# Patient Record
Sex: MALE | NOT HISPANIC OR LATINO | Employment: FULL TIME | URBAN - METROPOLITAN AREA
[De-identification: names, ages, dates, MRNs, and addresses within clinical notes are randomized per-mention and may not be internally consistent; named-entity substitution may affect disease eponyms.]

---

## 2017-03-26 ENCOUNTER — APPOINTMENT (OUTPATIENT)
Dept: GENERAL RADIOLOGY | Facility: CLINIC | Age: 52
DRG: 268 | End: 2017-03-26
Attending: EMERGENCY MEDICINE
Payer: COMMERCIAL

## 2017-03-26 ENCOUNTER — APPOINTMENT (OUTPATIENT)
Dept: CARDIOLOGY | Facility: CLINIC | Age: 52
DRG: 268 | End: 2017-03-26
Attending: INTERNAL MEDICINE
Payer: COMMERCIAL

## 2017-03-26 ENCOUNTER — HOSPITAL ENCOUNTER (INPATIENT)
Facility: CLINIC | Age: 52
LOS: 1 days | Discharge: ANOTHER HOSPITAL | DRG: 268 | End: 2017-03-27
Attending: EMERGENCY MEDICINE | Admitting: INTERNAL MEDICINE
Payer: COMMERCIAL

## 2017-03-26 ENCOUNTER — ANESTHESIA EVENT (OUTPATIENT)
Dept: CARDIOLOGY | Facility: CLINIC | Age: 52
DRG: 268 | End: 2017-03-26
Payer: COMMERCIAL

## 2017-03-26 ENCOUNTER — ANESTHESIA (OUTPATIENT)
Dept: CARDIOLOGY | Facility: CLINIC | Age: 52
DRG: 268 | End: 2017-03-26
Payer: COMMERCIAL

## 2017-03-26 ENCOUNTER — APPOINTMENT (OUTPATIENT)
Dept: GENERAL RADIOLOGY | Facility: CLINIC | Age: 52
DRG: 268 | End: 2017-03-26
Attending: INTERNAL MEDICINE
Payer: COMMERCIAL

## 2017-03-26 ENCOUNTER — APPOINTMENT (OUTPATIENT)
Dept: CARDIOLOGY | Facility: CLINIC | Age: 52
DRG: 268 | End: 2017-03-26
Attending: EMERGENCY MEDICINE
Payer: COMMERCIAL

## 2017-03-26 DIAGNOSIS — N17.9 ACUTE KIDNEY INJURY (H): ICD-10-CM

## 2017-03-26 DIAGNOSIS — I21.3 ST ELEVATION MYOCARDIAL INFARCTION (STEMI), UNSPECIFIED ARTERY (H): ICD-10-CM

## 2017-03-26 DIAGNOSIS — R57.0 CARDIOGENIC SHOCK (H): ICD-10-CM

## 2017-03-26 PROBLEM — I21.9 ACUTE MI (H): Status: ACTIVE | Noted: 2017-03-26

## 2017-03-26 LAB
ALBUMIN SERPL-MCNC: 4.1 G/DL (ref 3.4–5)
ALBUMIN UR-MCNC: 10 MG/DL
ALP SERPL-CCNC: 79 U/L (ref 40–150)
ALT SERPL W P-5'-P-CCNC: 981 U/L (ref 0–70)
ANION GAP SERPL CALCULATED.3IONS-SCNC: 13 MMOL/L (ref 3–14)
APPEARANCE UR: CLEAR
APTT PPP: 61 SEC (ref 22–37)
AST SERPL W P-5'-P-CCNC: 1985 U/L (ref 0–45)
BASE DEFICIT BLDV-SCNC: 7.1 MMOL/L
BASOPHILS # BLD AUTO: 0 10E9/L (ref 0–0.2)
BASOPHILS # BLD AUTO: 0 10E9/L (ref 0–0.2)
BASOPHILS NFR BLD AUTO: 0.1 %
BASOPHILS NFR BLD AUTO: 0.1 %
BILIRUB SERPL-MCNC: 0.8 MG/DL (ref 0.2–1.3)
BILIRUB UR QL STRIP: NEGATIVE
BUN SERPL-MCNC: 29 MG/DL (ref 7–30)
CA-I SERPL ISE-MCNC: 3.9 MG/DL (ref 4.4–5.2)
CALCIUM SERPL-MCNC: 8.9 MG/DL (ref 8.5–10.1)
CHLORIDE SERPL-SCNC: 102 MMOL/L (ref 94–109)
CO2 SERPL-SCNC: 25 MMOL/L (ref 20–32)
COLOR UR AUTO: ABNORMAL
CREAT BLD-MCNC: 2.4 MG/DL (ref 0.66–1.25)
CREAT SERPL-MCNC: 2.33 MG/DL (ref 0.66–1.25)
DIFFERENTIAL METHOD BLD: ABNORMAL
DIFFERENTIAL METHOD BLD: ABNORMAL
EOSINOPHIL # BLD AUTO: 0 10E9/L (ref 0–0.7)
EOSINOPHIL # BLD AUTO: 0 10E9/L (ref 0–0.7)
EOSINOPHIL NFR BLD AUTO: 0 %
EOSINOPHIL NFR BLD AUTO: 0 %
ERYTHROCYTE [DISTWIDTH] IN BLOOD BY AUTOMATED COUNT: 12.9 % (ref 10–15)
ERYTHROCYTE [DISTWIDTH] IN BLOOD BY AUTOMATED COUNT: 12.9 % (ref 10–15)
FIBRINOGEN PPP-MCNC: 324 MG/DL (ref 200–420)
GFR SERPL CREATININE-BSD FRML MDRD: 29 ML/MIN/1.7M2
GFR SERPL CREATININE-BSD FRML MDRD: 30 ML/MIN/1.7M2
GLUCOSE BLDC GLUCOMTR-MCNC: 183 MG/DL (ref 70–99)
GLUCOSE SERPL-MCNC: 185 MG/DL (ref 70–99)
GLUCOSE UR STRIP-MCNC: 300 MG/DL
HCO3 BLDV-SCNC: 20 MMOL/L (ref 21–28)
HCT VFR BLD AUTO: 40 % (ref 40–53)
HCT VFR BLD AUTO: 45.1 % (ref 40–53)
HGB BLD-MCNC: 13.6 G/DL (ref 13.3–17.7)
HGB BLD-MCNC: 15.7 G/DL (ref 13.3–17.7)
HGB UR QL STRIP: ABNORMAL
IMM GRANULOCYTES # BLD: 0.1 10E9/L (ref 0–0.4)
IMM GRANULOCYTES # BLD: 0.1 10E9/L (ref 0–0.4)
IMM GRANULOCYTES NFR BLD: 0.3 %
IMM GRANULOCYTES NFR BLD: 0.4 %
INR PPP: 1.13 (ref 0.86–1.14)
INR PPP: 1.6 (ref 0.86–1.14)
INTERPRETATION ECG - MUSE: NORMAL
KETONES UR STRIP-MCNC: NEGATIVE MG/DL
LACTATE SERPL-SCNC: 5.3 MMOL/L (ref 0.4–2)
LEUKOCYTE ESTERASE UR QL STRIP: NEGATIVE
LYMPHOCYTES # BLD AUTO: 1.1 10E9/L (ref 0.8–5.3)
LYMPHOCYTES # BLD AUTO: 1.4 10E9/L (ref 0.8–5.3)
LYMPHOCYTES NFR BLD AUTO: 5.8 %
LYMPHOCYTES NFR BLD AUTO: 9.4 %
MCH RBC QN AUTO: 30.9 PG (ref 26.5–33)
MCH RBC QN AUTO: 31.4 PG (ref 26.5–33)
MCHC RBC AUTO-ENTMCNC: 34 G/DL (ref 31.5–36.5)
MCHC RBC AUTO-ENTMCNC: 34.8 G/DL (ref 31.5–36.5)
MCV RBC AUTO: 90 FL (ref 78–100)
MCV RBC AUTO: 91 FL (ref 78–100)
MONOCYTES # BLD AUTO: 0.8 10E9/L (ref 0–1.3)
MONOCYTES # BLD AUTO: 2.1 10E9/L (ref 0–1.3)
MONOCYTES NFR BLD AUTO: 11.1 %
MONOCYTES NFR BLD AUTO: 5.7 %
MUCOUS THREADS #/AREA URNS LPF: PRESENT /LPF
NEUTROPHILS # BLD AUTO: 12.1 10E9/L (ref 1.6–8.3)
NEUTROPHILS # BLD AUTO: 15.4 10E9/L (ref 1.6–8.3)
NEUTROPHILS NFR BLD AUTO: 82.7 %
NEUTROPHILS NFR BLD AUTO: 84.4 %
NITRATE UR QL: NEGATIVE
NRBC # BLD AUTO: 0 10*3/UL
NRBC # BLD AUTO: 0 10*3/UL
NRBC BLD AUTO-RTO: 0 /100
NRBC BLD AUTO-RTO: 0 /100
OXYHGB MFR BLDV: 67 %
PCO2 BLDV: 46 MM HG (ref 40–50)
PH BLDV: 7.24 PH (ref 7.32–7.43)
PH UR STRIP: 5 PH (ref 5–7)
PLATELET # BLD AUTO: 196 10E9/L (ref 150–450)
PLATELET # BLD AUTO: 204 10E9/L (ref 150–450)
PO2 BLDV: 42 MM HG (ref 25–47)
POTASSIUM SERPL-SCNC: 4.6 MMOL/L (ref 3.4–5.3)
PROT SERPL-MCNC: 8.5 G/DL (ref 6.8–8.8)
RBC # BLD AUTO: 4.4 10E12/L (ref 4.4–5.9)
RBC # BLD AUTO: 5 10E12/L (ref 4.4–5.9)
RBC #/AREA URNS AUTO: 1 /HPF (ref 0–2)
SODIUM SERPL-SCNC: 140 MMOL/L (ref 133–144)
SP GR UR STRIP: 1.02 (ref 1–1.03)
SQUAMOUS #/AREA URNS AUTO: 1 /HPF (ref 0–1)
TROPONIN I SERPL-MCNC: ABNORMAL UG/L (ref 0–0.04)
TROPONIN I SERPL-MCNC: ABNORMAL UG/L (ref 0–0.04)
URN SPEC COLLECT METH UR: ABNORMAL
UROBILINOGEN UR STRIP-MCNC: NORMAL MG/DL (ref 0–2)
WBC # BLD AUTO: 14.3 10E9/L (ref 4–11)
WBC # BLD AUTO: 18.6 10E9/L (ref 4–11)
WBC #/AREA URNS AUTO: 1 /HPF (ref 0–2)

## 2017-03-26 PROCEDURE — 25000128 H RX IP 250 OP 636

## 2017-03-26 PROCEDURE — 82805 BLOOD GASES W/O2 SATURATION: CPT | Performed by: INTERNAL MEDICINE

## 2017-03-26 PROCEDURE — 25000132 ZZH RX MED GY IP 250 OP 250 PS 637: Performed by: EMERGENCY MEDICINE

## 2017-03-26 PROCEDURE — 37000008 ZZH ANESTHESIA TECHNICAL FEE, 1ST 30 MIN

## 2017-03-26 PROCEDURE — 27210795 ZZH PAD DEFIB QUICK CR4

## 2017-03-26 PROCEDURE — 83605 ASSAY OF LACTIC ACID: CPT | Performed by: INTERNAL MEDICINE

## 2017-03-26 PROCEDURE — 00000146 ZZHCL STATISTIC GLUCOSE BY METER IP

## 2017-03-26 PROCEDURE — 92941 PRQ TRLML REVSC TOT OCCL AMI: CPT | Mod: RC

## 2017-03-26 PROCEDURE — 82565 ASSAY OF CREATININE: CPT

## 2017-03-26 PROCEDURE — 02C03ZZ EXTIRPATION OF MATTER FROM CORONARY ARTERY, ONE ARTERY, PERCUTANEOUS APPROACH: ICD-10-PCS | Performed by: INTERNAL MEDICINE

## 2017-03-26 PROCEDURE — 40000010 ZZH STATISTIC ANES STAT CODE-CRNA PER MINUTE

## 2017-03-26 PROCEDURE — 27210946 ZZH KIT HC TOTES DISP CR8

## 2017-03-26 PROCEDURE — C1725 CATH, TRANSLUMIN NON-LASER: HCPCS

## 2017-03-26 PROCEDURE — 25000128 H RX IP 250 OP 636: Performed by: INTERNAL MEDICINE

## 2017-03-26 PROCEDURE — 85025 COMPLETE CBC W/AUTO DIFF WBC: CPT | Performed by: EMERGENCY MEDICINE

## 2017-03-26 PROCEDURE — 31500 INSERT EMERGENCY AIRWAY: CPT | Performed by: ANESTHESIOLOGY

## 2017-03-26 PROCEDURE — 33967 INSERT I-AORT PERCUT DEVICE: CPT

## 2017-03-26 PROCEDURE — 81001 URINALYSIS AUTO W/SCOPE: CPT | Performed by: INTERNAL MEDICINE

## 2017-03-26 PROCEDURE — 93308 TTE F-UP OR LMTD: CPT | Mod: 26 | Performed by: INTERNAL MEDICINE

## 2017-03-26 PROCEDURE — 99153 MOD SED SAME PHYS/QHP EA: CPT

## 2017-03-26 PROCEDURE — 40000275 ZZH STATISTIC RCP TIME EA 10 MIN

## 2017-03-26 PROCEDURE — C1757 CATH, THROMBECTOMY/EMBOLECT: HCPCS

## 2017-03-26 PROCEDURE — 87040 BLOOD CULTURE FOR BACTERIA: CPT | Performed by: INTERNAL MEDICINE

## 2017-03-26 PROCEDURE — 93454 CORONARY ARTERY ANGIO S&I: CPT

## 2017-03-26 PROCEDURE — 99152 MOD SED SAME PHYS/QHP 5/>YRS: CPT

## 2017-03-26 PROCEDURE — 5A1935Z RESPIRATORY VENTILATION, LESS THAN 24 CONSECUTIVE HOURS: ICD-10-PCS | Performed by: ANESTHESIOLOGY

## 2017-03-26 PROCEDURE — 3E073PZ INTRODUCTION OF PLATELET INHIBITOR INTO CORONARY ARTERY, PERCUTANEOUS APPROACH: ICD-10-PCS | Performed by: INTERNAL MEDICINE

## 2017-03-26 PROCEDURE — 36415 COLL VENOUS BLD VENIPUNCTURE: CPT | Performed by: INTERNAL MEDICINE

## 2017-03-26 PROCEDURE — 40000281 ZZH STATISTIC TRANSPORT TIME EA 15 MIN

## 2017-03-26 PROCEDURE — 93321 DOPPLER ECHO F-UP/LMTD STD: CPT | Mod: 26 | Performed by: INTERNAL MEDICINE

## 2017-03-26 PROCEDURE — 80053 COMPREHEN METABOLIC PANEL: CPT | Performed by: EMERGENCY MEDICINE

## 2017-03-26 PROCEDURE — 5A02210 ASSISTANCE WITH CARDIAC OUTPUT USING BALLOON PUMP, CONTINUOUS: ICD-10-PCS | Performed by: INTERNAL MEDICINE

## 2017-03-26 PROCEDURE — 93308 TTE F-UP OR LMTD: CPT

## 2017-03-26 PROCEDURE — 93799 UNLISTED CV SVC/PROCEDURE: CPT | Mod: GC | Performed by: INTERNAL MEDICINE

## 2017-03-26 PROCEDURE — 92920 PRQ TRLUML C ANGIOP 1ART&/BR: CPT

## 2017-03-26 PROCEDURE — 27211089 ZZH KIT ACIST INJECTOR CR3

## 2017-03-26 PROCEDURE — 99152 MOD SED SAME PHYS/QHP 5/>YRS: CPT | Mod: GC | Performed by: INTERNAL MEDICINE

## 2017-03-26 PROCEDURE — 33210 INSERT ELECTRD/PM CATH SNGL: CPT

## 2017-03-26 PROCEDURE — C1769 GUIDE WIRE: HCPCS

## 2017-03-26 PROCEDURE — 20000003 ZZH R&B ICU

## 2017-03-26 PROCEDURE — 85610 PROTHROMBIN TIME: CPT | Performed by: EMERGENCY MEDICINE

## 2017-03-26 PROCEDURE — 25000125 ZZHC RX 250: Performed by: INTERNAL MEDICINE

## 2017-03-26 PROCEDURE — 93325 DOPPLER ECHO COLOR FLOW MAPG: CPT | Mod: 26 | Performed by: INTERNAL MEDICINE

## 2017-03-26 PROCEDURE — 92920 PRQ TRLUML C ANGIOP 1ART&/BR: CPT | Mod: RC | Performed by: INTERNAL MEDICINE

## 2017-03-26 PROCEDURE — 86901 BLOOD TYPING SEROLOGIC RH(D): CPT | Performed by: INTERNAL MEDICINE

## 2017-03-26 PROCEDURE — 99153 MOD SED SAME PHYS/QHP EA: CPT | Mod: GC | Performed by: INTERNAL MEDICINE

## 2017-03-26 PROCEDURE — 40000276 ZZH STATISTIC RCP TIME ED VENT EA 10 MIN

## 2017-03-26 PROCEDURE — 33967 INSERT I-AORT PERCUT DEVICE: CPT | Mod: GC | Performed by: INTERNAL MEDICINE

## 2017-03-26 PROCEDURE — 25000128 H RX IP 250 OP 636: Performed by: EMERGENCY MEDICINE

## 2017-03-26 PROCEDURE — 25000125 ZZHC RX 250

## 2017-03-26 PROCEDURE — 36556 INSERT NON-TUNNEL CV CATH: CPT | Performed by: INTERNAL MEDICINE

## 2017-03-26 PROCEDURE — 40000940 XR CHEST PORT 1 VW

## 2017-03-26 PROCEDURE — 86900 BLOOD TYPING SEROLOGIC ABO: CPT | Performed by: INTERNAL MEDICINE

## 2017-03-26 PROCEDURE — 87641 MR-STAPH DNA AMP PROBE: CPT | Performed by: INTERNAL MEDICINE

## 2017-03-26 PROCEDURE — 25000132 ZZH RX MED GY IP 250 OP 250 PS 637: Performed by: INTERNAL MEDICINE

## 2017-03-26 PROCEDURE — 99291 CRITICAL CARE FIRST HOUR: CPT | Mod: 25 | Performed by: INTERNAL MEDICINE

## 2017-03-26 PROCEDURE — B2111ZZ FLUOROSCOPY OF MULTIPLE CORONARY ARTERIES USING LOW OSMOLAR CONTRAST: ICD-10-PCS | Performed by: INTERNAL MEDICINE

## 2017-03-26 PROCEDURE — 84484 ASSAY OF TROPONIN QUANT: CPT | Performed by: EMERGENCY MEDICINE

## 2017-03-26 PROCEDURE — 27210759 ZZH DEVICE INFLATION CR6

## 2017-03-26 PROCEDURE — C1887 CATHETER, GUIDING: HCPCS

## 2017-03-26 PROCEDURE — 25000125 ZZHC RX 250: Performed by: NURSE ANESTHETIST, CERTIFIED REGISTERED

## 2017-03-26 PROCEDURE — 99292 CRITICAL CARE ADDL 30 MIN: CPT | Mod: 25 | Performed by: INTERNAL MEDICINE

## 2017-03-26 PROCEDURE — 71010 XR CHEST PORT 1 VW: CPT

## 2017-03-26 PROCEDURE — 93454 CORONARY ARTERY ANGIO S&I: CPT | Mod: 26 | Performed by: INTERNAL MEDICINE

## 2017-03-26 PROCEDURE — 3E043XZ INTRODUCTION OF VASOPRESSOR INTO CENTRAL VEIN, PERCUTANEOUS APPROACH: ICD-10-PCS | Performed by: INTERNAL MEDICINE

## 2017-03-26 PROCEDURE — 86850 RBC ANTIBODY SCREEN: CPT | Performed by: INTERNAL MEDICINE

## 2017-03-26 PROCEDURE — 82330 ASSAY OF CALCIUM: CPT | Performed by: INTERNAL MEDICINE

## 2017-03-26 PROCEDURE — 27210848 ZZH CATH IABP PUMP CR18

## 2017-03-26 PROCEDURE — 94002 VENT MGMT INPAT INIT DAY: CPT

## 2017-03-26 PROCEDURE — 87640 STAPH A DNA AMP PROBE: CPT | Performed by: INTERNAL MEDICINE

## 2017-03-26 RX ORDER — FENTANYL CITRATE 50 UG/ML
25-50 INJECTION, SOLUTION INTRAMUSCULAR; INTRAVENOUS
Status: DISPENSED | OUTPATIENT
Start: 2017-03-26 | End: 2017-03-27

## 2017-03-26 RX ORDER — SODIUM NITROPRUSSIDE 25 MG/ML
100-200 INJECTION INTRAVENOUS
Status: DISCONTINUED | OUTPATIENT
Start: 2017-03-26 | End: 2017-03-26 | Stop reason: HOSPADM

## 2017-03-26 RX ORDER — AMOXICILLIN 250 MG
1-2 CAPSULE ORAL 2 TIMES DAILY PRN
Status: DISCONTINUED | OUTPATIENT
Start: 2017-03-26 | End: 2017-03-27 | Stop reason: HOSPADM

## 2017-03-26 RX ORDER — ADENOSINE 3 MG/ML
12-12000 INJECTION, SOLUTION INTRAVENOUS
Status: DISCONTINUED | OUTPATIENT
Start: 2017-03-26 | End: 2017-03-26 | Stop reason: HOSPADM

## 2017-03-26 RX ORDER — DOPAMINE HYDROCHLORIDE 160 MG/100ML
2-20 INJECTION, SOLUTION INTRAVENOUS CONTINUOUS
Status: DISCONTINUED | OUTPATIENT
Start: 2017-03-26 | End: 2017-03-27 | Stop reason: HOSPADM

## 2017-03-26 RX ORDER — PROMETHAZINE HYDROCHLORIDE 25 MG/ML
6.25-25 INJECTION, SOLUTION INTRAMUSCULAR; INTRAVENOUS EVERY 4 HOURS PRN
Status: DISCONTINUED | OUTPATIENT
Start: 2017-03-26 | End: 2017-03-26 | Stop reason: HOSPADM

## 2017-03-26 RX ORDER — EPTIFIBATIDE 2 MG/ML
180 INJECTION, SOLUTION INTRAVENOUS EVERY 10 MIN PRN
Status: DISCONTINUED | OUTPATIENT
Start: 2017-03-26 | End: 2017-03-26 | Stop reason: HOSPADM

## 2017-03-26 RX ORDER — FENTANYL CITRATE 50 UG/ML
INJECTION, SOLUTION INTRAMUSCULAR; INTRAVENOUS
Status: COMPLETED
Start: 2017-03-26 | End: 2017-03-26

## 2017-03-26 RX ORDER — NITROGLYCERIN 0.4 MG/1
0.4 TABLET SUBLINGUAL EVERY 5 MIN PRN
Status: DISCONTINUED | OUTPATIENT
Start: 2017-03-26 | End: 2017-03-26 | Stop reason: HOSPADM

## 2017-03-26 RX ORDER — ACETAMINOPHEN 325 MG/1
325-650 TABLET ORAL EVERY 4 HOURS PRN
Status: DISCONTINUED | OUTPATIENT
Start: 2017-03-26 | End: 2017-03-27 | Stop reason: HOSPADM

## 2017-03-26 RX ORDER — FENTANYL CITRATE 50 UG/ML
50 INJECTION, SOLUTION INTRAMUSCULAR; INTRAVENOUS ONCE
Status: COMPLETED | OUTPATIENT
Start: 2017-03-26 | End: 2017-03-26

## 2017-03-26 RX ORDER — NICARDIPINE HYDROCHLORIDE 2.5 MG/ML
100 INJECTION INTRAVENOUS
Status: DISCONTINUED | OUTPATIENT
Start: 2017-03-26 | End: 2017-03-26 | Stop reason: HOSPADM

## 2017-03-26 RX ORDER — HEPARIN SODIUM 5000 [USP'U]/.5ML
5000 INJECTION, SOLUTION INTRAVENOUS; SUBCUTANEOUS EVERY 8 HOURS
Status: DISCONTINUED | OUTPATIENT
Start: 2017-03-26 | End: 2017-03-26

## 2017-03-26 RX ORDER — NIFEDIPINE 10 MG/1
10 CAPSULE ORAL
Status: DISCONTINUED | OUTPATIENT
Start: 2017-03-26 | End: 2017-03-26 | Stop reason: HOSPADM

## 2017-03-26 RX ORDER — LIDOCAINE HYDROCHLORIDE 10 MG/ML
30 INJECTION, SOLUTION EPIDURAL; INFILTRATION; INTRACAUDAL; PERINEURAL
Status: DISCONTINUED | OUTPATIENT
Start: 2017-03-26 | End: 2017-03-26 | Stop reason: HOSPADM

## 2017-03-26 RX ORDER — NICOTINE POLACRILEX 4 MG
15-30 LOZENGE BUCCAL
Status: DISCONTINUED | OUTPATIENT
Start: 2017-03-26 | End: 2017-03-27 | Stop reason: HOSPADM

## 2017-03-26 RX ORDER — ADENOSINE 3 MG/ML
6 INJECTION, SOLUTION INTRAVENOUS ONCE
Status: COMPLETED | OUTPATIENT
Start: 2017-03-26 | End: 2017-03-26

## 2017-03-26 RX ORDER — IOPAMIDOL 755 MG/ML
55 INJECTION, SOLUTION INTRAVASCULAR ONCE
Status: COMPLETED | OUTPATIENT
Start: 2017-03-26 | End: 2017-03-26

## 2017-03-26 RX ORDER — PROTAMINE SULFATE 10 MG/ML
25-100 INJECTION, SOLUTION INTRAVENOUS EVERY 5 MIN PRN
Status: DISCONTINUED | OUTPATIENT
Start: 2017-03-26 | End: 2017-03-26 | Stop reason: HOSPADM

## 2017-03-26 RX ORDER — NALOXONE HYDROCHLORIDE 0.4 MG/ML
.1-.4 INJECTION, SOLUTION INTRAMUSCULAR; INTRAVENOUS; SUBCUTANEOUS
Status: DISCONTINUED | OUTPATIENT
Start: 2017-03-26 | End: 2017-03-26

## 2017-03-26 RX ORDER — ASPIRIN 325 MG
325 TABLET ORAL
Status: DISCONTINUED | OUTPATIENT
Start: 2017-03-26 | End: 2017-03-26 | Stop reason: HOSPADM

## 2017-03-26 RX ORDER — DOPAMINE HYDROCHLORIDE 160 MG/100ML
2-20 INJECTION, SOLUTION INTRAVENOUS CONTINUOUS PRN
Status: DISCONTINUED | OUTPATIENT
Start: 2017-03-26 | End: 2017-03-26 | Stop reason: HOSPADM

## 2017-03-26 RX ORDER — ETOMIDATE 2 MG/ML
INJECTION INTRAVENOUS PRN
Status: DISCONTINUED | OUTPATIENT
Start: 2017-03-26 | End: 2017-03-26

## 2017-03-26 RX ORDER — ONDANSETRON 2 MG/ML
4 INJECTION INTRAMUSCULAR; INTRAVENOUS EVERY 4 HOURS PRN
Status: DISCONTINUED | OUTPATIENT
Start: 2017-03-26 | End: 2017-03-26 | Stop reason: HOSPADM

## 2017-03-26 RX ORDER — NALOXONE HYDROCHLORIDE 0.4 MG/ML
.1-.4 INJECTION, SOLUTION INTRAMUSCULAR; INTRAVENOUS; SUBCUTANEOUS
Status: DISCONTINUED | OUTPATIENT
Start: 2017-03-26 | End: 2017-03-27 | Stop reason: HOSPADM

## 2017-03-26 RX ORDER — DOBUTAMINE HYDROCHLORIDE 200 MG/100ML
2-20 INJECTION INTRAVENOUS CONTINUOUS PRN
Status: DISCONTINUED | OUTPATIENT
Start: 2017-03-26 | End: 2017-03-26 | Stop reason: HOSPADM

## 2017-03-26 RX ORDER — HYDROCODONE BITARTRATE AND ACETAMINOPHEN 5; 325 MG/1; MG/1
1-2 TABLET ORAL EVERY 4 HOURS PRN
Status: DISCONTINUED | OUTPATIENT
Start: 2017-03-26 | End: 2017-03-27 | Stop reason: HOSPADM

## 2017-03-26 RX ORDER — ENALAPRILAT 1.25 MG/ML
1.25-2.5 INJECTION INTRAVENOUS
Status: DISCONTINUED | OUTPATIENT
Start: 2017-03-26 | End: 2017-03-26 | Stop reason: HOSPADM

## 2017-03-26 RX ORDER — POTASSIUM CHLORIDE 7.45 MG/ML
10 INJECTION INTRAVENOUS
Status: DISCONTINUED | OUTPATIENT
Start: 2017-03-26 | End: 2017-03-26 | Stop reason: HOSPADM

## 2017-03-26 RX ORDER — NITROGLYCERIN 5 MG/ML
100-500 VIAL (ML) INTRAVENOUS
Status: DISCONTINUED | OUTPATIENT
Start: 2017-03-26 | End: 2017-03-26 | Stop reason: HOSPADM

## 2017-03-26 RX ORDER — NITROGLYCERIN 20 MG/100ML
.07-2 INJECTION INTRAVENOUS CONTINUOUS PRN
Status: DISCONTINUED | OUTPATIENT
Start: 2017-03-26 | End: 2017-03-26 | Stop reason: HOSPADM

## 2017-03-26 RX ORDER — DIPHENHYDRAMINE HYDROCHLORIDE 50 MG/ML
25-50 INJECTION INTRAMUSCULAR; INTRAVENOUS
Status: DISCONTINUED | OUTPATIENT
Start: 2017-03-26 | End: 2017-03-26 | Stop reason: HOSPADM

## 2017-03-26 RX ORDER — NALOXONE HYDROCHLORIDE 0.4 MG/ML
.2-.4 INJECTION, SOLUTION INTRAMUSCULAR; INTRAVENOUS; SUBCUTANEOUS
Status: ACTIVE | OUTPATIENT
Start: 2017-03-26 | End: 2017-03-27

## 2017-03-26 RX ORDER — MIDAZOLAM (PF) 1 MG/ML IN 0.9 % SODIUM CHLORIDE INTRAVENOUS SOLUTION
1-8 CONTINUOUS
Status: DISCONTINUED | OUTPATIENT
Start: 2017-03-26 | End: 2017-03-27 | Stop reason: HOSPADM

## 2017-03-26 RX ORDER — IPRATROPIUM BROMIDE AND ALBUTEROL SULFATE 2.5; .5 MG/3ML; MG/3ML
3 SOLUTION RESPIRATORY (INHALATION) EVERY 4 HOURS PRN
Status: DISCONTINUED | OUTPATIENT
Start: 2017-03-26 | End: 2017-03-27 | Stop reason: HOSPADM

## 2017-03-26 RX ORDER — ASPIRIN 81 MG/1
81 TABLET ORAL DAILY
Status: DISCONTINUED | OUTPATIENT
Start: 2017-03-27 | End: 2017-03-27

## 2017-03-26 RX ORDER — SODIUM CHLORIDE 9 MG/ML
INJECTION, SOLUTION INTRAVENOUS CONTINUOUS
Status: ACTIVE | OUTPATIENT
Start: 2017-03-26 | End: 2017-03-27

## 2017-03-26 RX ORDER — ONDANSETRON 2 MG/ML
4 INJECTION INTRAMUSCULAR; INTRAVENOUS EVERY 6 HOURS PRN
Status: DISCONTINUED | OUTPATIENT
Start: 2017-03-26 | End: 2017-03-27 | Stop reason: HOSPADM

## 2017-03-26 RX ORDER — CLOPIDOGREL BISULFATE 75 MG/1
75 TABLET ORAL
Status: DISCONTINUED | OUTPATIENT
Start: 2017-03-26 | End: 2017-03-26 | Stop reason: HOSPADM

## 2017-03-26 RX ORDER — HEPARIN SODIUM 1000 [USP'U]/ML
INJECTION, SOLUTION INTRAVENOUS; SUBCUTANEOUS
Status: COMPLETED
Start: 2017-03-26 | End: 2017-03-26

## 2017-03-26 RX ORDER — PRASUGREL 10 MG/1
10-60 TABLET, FILM COATED ORAL
Status: DISCONTINUED | OUTPATIENT
Start: 2017-03-26 | End: 2017-03-26 | Stop reason: HOSPADM

## 2017-03-26 RX ORDER — ONDANSETRON 4 MG/1
4 TABLET, ORALLY DISINTEGRATING ORAL EVERY 6 HOURS PRN
Status: DISCONTINUED | OUTPATIENT
Start: 2017-03-26 | End: 2017-03-27 | Stop reason: HOSPADM

## 2017-03-26 RX ORDER — PHENYLEPHRINE HCL IN 0.9% NACL 1 MG/10 ML
20-100 SYRINGE (ML) INTRAVENOUS
Status: DISCONTINUED | OUTPATIENT
Start: 2017-03-26 | End: 2017-03-26 | Stop reason: HOSPADM

## 2017-03-26 RX ORDER — ASPIRIN 81 MG/1
81-324 TABLET, CHEWABLE ORAL
Status: DISCONTINUED | OUTPATIENT
Start: 2017-03-26 | End: 2017-03-26 | Stop reason: HOSPADM

## 2017-03-26 RX ORDER — HYDRALAZINE HYDROCHLORIDE 20 MG/ML
10-20 INJECTION INTRAMUSCULAR; INTRAVENOUS
Status: DISCONTINUED | OUTPATIENT
Start: 2017-03-26 | End: 2017-03-26 | Stop reason: HOSPADM

## 2017-03-26 RX ORDER — FENTANYL CITRATE 50 UG/ML
25-50 INJECTION, SOLUTION INTRAMUSCULAR; INTRAVENOUS
Status: DISCONTINUED | OUTPATIENT
Start: 2017-03-26 | End: 2017-03-26 | Stop reason: HOSPADM

## 2017-03-26 RX ORDER — LIDOCAINE 40 MG/G
CREAM TOPICAL
Status: DISCONTINUED | OUTPATIENT
Start: 2017-03-26 | End: 2017-03-27 | Stop reason: HOSPADM

## 2017-03-26 RX ORDER — HEPARIN SODIUM 1000 [USP'U]/ML
1000-10000 INJECTION, SOLUTION INTRAVENOUS; SUBCUTANEOUS EVERY 5 MIN PRN
Status: DISCONTINUED | OUTPATIENT
Start: 2017-03-26 | End: 2017-03-26 | Stop reason: HOSPADM

## 2017-03-26 RX ORDER — EPTIFIBATIDE 2 MG/ML
2 INJECTION, SOLUTION INTRAVENOUS CONTINUOUS PRN
Status: DISCONTINUED | OUTPATIENT
Start: 2017-03-26 | End: 2017-03-26 | Stop reason: HOSPADM

## 2017-03-26 RX ORDER — POTASSIUM CHLORIDE 29.8 MG/ML
20 INJECTION INTRAVENOUS
Status: DISCONTINUED | OUTPATIENT
Start: 2017-03-26 | End: 2017-03-26 | Stop reason: HOSPADM

## 2017-03-26 RX ORDER — FLUMAZENIL 0.1 MG/ML
0.2 INJECTION, SOLUTION INTRAVENOUS
Status: DISCONTINUED | OUTPATIENT
Start: 2017-03-26 | End: 2017-03-26 | Stop reason: HOSPADM

## 2017-03-26 RX ORDER — NITROGLYCERIN 0.4 MG/1
0.4 TABLET SUBLINGUAL EVERY 5 MIN PRN
Status: DISCONTINUED | OUTPATIENT
Start: 2017-03-26 | End: 2017-03-27 | Stop reason: HOSPADM

## 2017-03-26 RX ORDER — DOBUTAMINE HYDROCHLORIDE 200 MG/100ML
2.5-2 INJECTION INTRAVENOUS CONTINUOUS
Status: DISCONTINUED | OUTPATIENT
Start: 2017-03-26 | End: 2017-03-26

## 2017-03-26 RX ORDER — LIDOCAINE HYDROCHLORIDE 10 MG/ML
1-10 INJECTION, SOLUTION EPIDURAL; INFILTRATION; INTRACAUDAL; PERINEURAL
Status: COMPLETED | OUTPATIENT
Start: 2017-03-26 | End: 2017-03-26

## 2017-03-26 RX ORDER — MORPHINE SULFATE 2 MG/ML
1-2 INJECTION, SOLUTION INTRAMUSCULAR; INTRAVENOUS EVERY 5 MIN PRN
Status: DISCONTINUED | OUTPATIENT
Start: 2017-03-26 | End: 2017-03-26 | Stop reason: HOSPADM

## 2017-03-26 RX ORDER — DEXTROSE MONOHYDRATE 25 G/50ML
25-50 INJECTION, SOLUTION INTRAVENOUS
Status: DISCONTINUED | OUTPATIENT
Start: 2017-03-26 | End: 2017-03-27 | Stop reason: HOSPADM

## 2017-03-26 RX ORDER — CHLORHEXIDINE GLUCONATE ORAL RINSE 1.2 MG/ML
15 SOLUTION DENTAL EVERY 12 HOURS
Status: DISCONTINUED | OUTPATIENT
Start: 2017-03-26 | End: 2017-03-27 | Stop reason: HOSPADM

## 2017-03-26 RX ORDER — FENTANYL CITRATE 50 UG/ML
25-50 INJECTION, SOLUTION INTRAMUSCULAR; INTRAVENOUS
Status: DISCONTINUED | OUTPATIENT
Start: 2017-03-26 | End: 2017-03-26

## 2017-03-26 RX ORDER — DEXTROSE MONOHYDRATE 25 G/50ML
12.5-5 INJECTION, SOLUTION INTRAVENOUS EVERY 30 MIN PRN
Status: DISCONTINUED | OUTPATIENT
Start: 2017-03-26 | End: 2017-03-26 | Stop reason: HOSPADM

## 2017-03-26 RX ORDER — NALOXONE HYDROCHLORIDE 0.4 MG/ML
0.4 INJECTION, SOLUTION INTRAMUSCULAR; INTRAVENOUS; SUBCUTANEOUS EVERY 5 MIN PRN
Status: DISCONTINUED | OUTPATIENT
Start: 2017-03-26 | End: 2017-03-26 | Stop reason: HOSPADM

## 2017-03-26 RX ORDER — PROTAMINE SULFATE 10 MG/ML
1-5 INJECTION, SOLUTION INTRAVENOUS
Status: DISCONTINUED | OUTPATIENT
Start: 2017-03-26 | End: 2017-03-26 | Stop reason: HOSPADM

## 2017-03-26 RX ORDER — FLUMAZENIL 0.1 MG/ML
0.2 INJECTION, SOLUTION INTRAVENOUS
Status: ACTIVE | OUTPATIENT
Start: 2017-03-26 | End: 2017-03-27

## 2017-03-26 RX ORDER — VERAPAMIL HYDROCHLORIDE 2.5 MG/ML
1-2.5 INJECTION, SOLUTION INTRAVENOUS
Status: DISCONTINUED | OUTPATIENT
Start: 2017-03-26 | End: 2017-03-26 | Stop reason: HOSPADM

## 2017-03-26 RX ORDER — SODIUM CHLORIDE 9 MG/ML
INJECTION, SOLUTION INTRAVENOUS ONCE
Status: DISCONTINUED | OUTPATIENT
Start: 2017-03-26 | End: 2017-03-26

## 2017-03-26 RX ORDER — FUROSEMIDE 10 MG/ML
20-100 INJECTION INTRAMUSCULAR; INTRAVENOUS
Status: DISCONTINUED | OUTPATIENT
Start: 2017-03-26 | End: 2017-03-26 | Stop reason: HOSPADM

## 2017-03-26 RX ORDER — FENTANYL CITRATE 50 UG/ML
25-50 INJECTION, SOLUTION INTRAMUSCULAR; INTRAVENOUS
Status: DISCONTINUED | OUTPATIENT
Start: 2017-03-26 | End: 2017-03-27 | Stop reason: HOSPADM

## 2017-03-26 RX ORDER — CLOPIDOGREL BISULFATE 75 MG/1
300-600 TABLET ORAL
Status: DISCONTINUED | OUTPATIENT
Start: 2017-03-26 | End: 2017-03-26 | Stop reason: HOSPADM

## 2017-03-26 RX ORDER — LORAZEPAM 2 MG/ML
.5-2 INJECTION INTRAMUSCULAR EVERY 4 HOURS PRN
Status: DISCONTINUED | OUTPATIENT
Start: 2017-03-26 | End: 2017-03-26 | Stop reason: HOSPADM

## 2017-03-26 RX ORDER — BUPIVACAINE HYDROCHLORIDE 2.5 MG/ML
1-10 INJECTION, SOLUTION EPIDURAL; INFILTRATION; INTRACAUDAL
Status: DISCONTINUED | OUTPATIENT
Start: 2017-03-26 | End: 2017-03-26 | Stop reason: HOSPADM

## 2017-03-26 RX ORDER — METHYLPREDNISOLONE SODIUM SUCCINATE 125 MG/2ML
125 INJECTION, POWDER, LYOPHILIZED, FOR SOLUTION INTRAMUSCULAR; INTRAVENOUS
Status: DISCONTINUED | OUTPATIENT
Start: 2017-03-26 | End: 2017-03-26 | Stop reason: HOSPADM

## 2017-03-26 RX ORDER — NITROGLYCERIN 5 MG/ML
100-200 VIAL (ML) INTRAVENOUS
Status: DISCONTINUED | OUTPATIENT
Start: 2017-03-26 | End: 2017-03-26 | Stop reason: HOSPADM

## 2017-03-26 RX ADMIN — HEPARIN SODIUM 600 UNITS/HR: 10000 INJECTION, SOLUTION INTRAVENOUS at 23:30

## 2017-03-26 RX ADMIN — MIDAZOLAM HYDROCHLORIDE 7.5 MG: 1 INJECTION, SOLUTION INTRAMUSCULAR; INTRAVENOUS at 20:15

## 2017-03-26 RX ADMIN — BIVALIRUDIN 1.75 MG/KG/HR: 250 INJECTION, POWDER, LYOPHILIZED, FOR SOLUTION INTRAVENOUS at 18:59

## 2017-03-26 RX ADMIN — IOPAMIDOL 55 ML: 755 INJECTION, SOLUTION INTRAVASCULAR at 19:45

## 2017-03-26 RX ADMIN — DOPAMINE HYDROCHLORIDE IN DEXTROSE 9 MCG/KG/MIN: 1.6 INJECTION, SOLUTION INTRAVENOUS at 23:10

## 2017-03-26 RX ADMIN — MIDAZOLAM HYDROCHLORIDE 2 MG: 1 INJECTION, SOLUTION INTRAMUSCULAR; INTRAVENOUS at 20:42

## 2017-03-26 RX ADMIN — PANTOPRAZOLE SODIUM 40 MG: 40 INJECTION, POWDER, FOR SOLUTION INTRAVENOUS at 23:40

## 2017-03-26 RX ADMIN — FENTANYL CITRATE 25 MCG: 50 INJECTION, SOLUTION INTRAMUSCULAR; INTRAVENOUS at 21:51

## 2017-03-26 RX ADMIN — SODIUM CHLORIDE: 9 INJECTION, SOLUTION INTRAVENOUS at 23:09

## 2017-03-26 RX ADMIN — ATROPINE SULFATE 1 MG: 0.1 INJECTION, SOLUTION ENDOTRACHEAL; INTRAMUSCULAR; INTRAVENOUS; SUBCUTANEOUS at 18:48

## 2017-03-26 RX ADMIN — CHLORHEXIDINE GLUCONATE 15 ML: 1.2 RINSE ORAL at 23:26

## 2017-03-26 RX ADMIN — Medication 1 MG/HR: at 21:05

## 2017-03-26 RX ADMIN — TICAGRELOR 180 MG: 90 TABLET ORAL at 18:07

## 2017-03-26 RX ADMIN — ADENOSINE 6 MG: 3 INJECTION, SOLUTION INTRAVENOUS at 19:27

## 2017-03-26 RX ADMIN — NITROGLYCERIN 200 MCG: 5 INJECTION, SOLUTION INTRAVENOUS at 18:43

## 2017-03-26 RX ADMIN — Medication 51.4 MG: at 18:57

## 2017-03-26 RX ADMIN — ETOMIDATE 20 MG: 2 INJECTION, SOLUTION INTRAVENOUS at 20:05

## 2017-03-26 RX ADMIN — LIDOCAINE HYDROCHLORIDE 90 MG: 10 INJECTION, SOLUTION EPIDURAL; INFILTRATION; INTRACAUDAL; PERINEURAL at 18:32

## 2017-03-26 RX ADMIN — FENTANYL CITRATE 100 MCG: 50 INJECTION, SOLUTION INTRAMUSCULAR; INTRAVENOUS at 20:14

## 2017-03-26 RX ADMIN — DOPAMINE HYDROCHLORIDE 2 MCG/KG/MIN: 160 INJECTION, SOLUTION INTRAVENOUS at 18:40

## 2017-03-26 RX ADMIN — AMIODARONE HYDROCHLORIDE 150 MG: 50 INJECTION, SOLUTION INTRAVENOUS at 19:30

## 2017-03-26 RX ADMIN — SODIUM CHLORIDE 1000 ML: 9 INJECTION, SOLUTION INTRAVENOUS at 18:06

## 2017-03-26 RX ADMIN — SODIUM CHLORIDE 1000 ML: 9 INJECTION, SOLUTION INTRAVENOUS at 18:49

## 2017-03-26 RX ADMIN — MIDAZOLAM HYDROCHLORIDE 0.5 MG: 1 INJECTION, SOLUTION INTRAMUSCULAR; INTRAVENOUS at 18:31

## 2017-03-26 RX ADMIN — HEPARIN SODIUM 4000 UNITS: 1000 INJECTION, SOLUTION INTRAVENOUS; SUBCUTANEOUS at 18:13

## 2017-03-26 ASSESSMENT — ENCOUNTER SYMPTOMS
BACK PAIN: 1
VOMITING: 1
LIGHT-HEADEDNESS: 0
DIZZINESS: 0

## 2017-03-26 NOTE — ED NOTES
Bed: [facility]  Expected date:   Expected time:   Means of arrival:   Comments:  Karon [address] 52 male

## 2017-03-26 NOTE — ED PROVIDER NOTES
"  History     Chief Complaint:  STEMI    HPI   Initial history is somewhat limited secondary to language barrier and lack of  present.    Luke Henao is a 51 year old [place]ian-speaking male with a PMH significant for diabetes who presents to the emergency department for evaluation of a STEMI. History is provided by EMS secondary to language barrier. Family informed EMS that the patient began having vomiting and \"flu-like\" symptoms two days ago along with back pain between his shoulder blades. He appeared generally weak today so his family called 911. EMS found he had a STEMI on EKG completed en route to the hospital. He had a blood sugar of 201, blood pressure of 88/57, and was bradycardic for paramedics. They gave him Aspirin but no Nitro. Here in the ED, the patient denies having any chest pain or back pain. He denies feeling lightheaded or dizzy.     Allergies:  No Known Drug Allergies     Medications:    Metformin    Past Medical History:    Diabetes  Hypertension    Past Surgical History:   History reviewed.  No pertinent past surgical history.    Family History:   History reviewed. No pertinent family history.    Social History:   Tobacco use:    Current 0.50 ppd smoker  Marital status:         Review of Systems   Gastrointestinal: Positive for vomiting.   Musculoskeletal: Positive for back pain.   Neurological: Negative for dizziness and light-headedness.   ROS is somewhat limited secondary to language barrier.     Physical Exam   First Vitals:  Resp 14  Ht 1.575 m (5' 2\")   BP: 90/80  HR: 59    Patient Vitals for the past 24 hrs:   BP Temp Heart Rate Resp SpO2 Height Weight   03/26/17 1810 (!) 73/60 - 51 11 100 % - -   03/26/17 1805 (!) 85/55 - 57  100 % - -   03/26/17 1800 (!) 85/60 - 57 9 95 % - -   03/26/17 1755 90/80 97.7  F (36.5  C) 90 14 - 1.575 m (5' 2\") 68.5 kg (151 lb 0.2 oz)       Physical Exam   General: Alert, interactive in moderate distress  Head:  Scalp is " atraumatic  Eyes:  The pupils are equal, round, and reactive to light    EOM's intact    No scleral icterus  ENT:      Nose:  The external nose is normal  Ears:  External ears are normal  Mouth/Throat: The oropharynx is normal    Mucus membranes are dry      Neck:  Normal range of motion.      There is no rigidity.    Trachea is in the midline         CV:  Bradycardia. Regular rhythm    No murmur     2+ pulses in all four extremities.   Resp:  Breath sounds are clear bilaterally    Non-labored, no retractions or accessory muscle use      GI:  Abdomen is soft, no distension, no tenderness.       MS:  Normal strength in all 4 extremities    No peripheral edema.  Skin:  Pallor/Jaundice. Warm and dry, No rash or lesions noted.  Neuro: Strength 5/5 x4.  Sensation intact  In all 4 extremities.        Psych:  Awake. Alert.  Normal affect.      Appropriate interactions.    Emergency Department Course   ECG (17:55:08):  Indication: Screening for cardiovascular disease.   Rate 57 bpm. AL interval 194. QRS duration 82. QT/QTc 452/439. P-R-T axes 68 -23 87.   Interpretation: Sinus bradycardia with fusion complexes. Inferior-posterior infarct, possibly acute. ** ** ACUTE MI/STEMI ** **. Consider right ventricular involvement in acute inferior infarct. Abnormal ECG.   Agree with computer interpretation.   Interpreted at 1756 by Dr. Bolanos.      Imaging:  Radiographic findings were communicated with the patient, family and Admitting MD who voiced understanding of the findings.  XR chest, portable:   Left mid to low chest partially obscured by overlying device but lungs appear clear and heart and pulmonary vessels within normal limits. No evidence for pleural effusion.  Radiology report.     Laboratory:  CBC: WBC 14.3 high, neutrophil 12.1 high, o/w WNL (HGB 15.7, )   CMP: Glucose 185 high, creatinine 2.33 high, GFR 30 low, o/w WNL  LFT's:  Pending  Creatinine POCT (1812): Creatinine 2.4 high, GFR 29 low  Troponin:  >200.000  INR: 1.13    Procedures:   Peripheral Line Placement     Procedure:  Peripheral Line Placement.    Indications: Vascular access    Consent:  Risks (including but not limited to: pneumothorax,bleeding, infection or artery puncture), benefits and alternatives were discussed with  patient and consent for procedure was obtained.    Timeout:  Universal protocol was followed. TIME OUT conducted just prior to starting procedure confirmed patient identity, site/side, procedure, patient position, and availability of correct equipment and implants.?  Yes    Procedure note:  Right external Jugular approach was selected and the right anterior neck was prepped, cleansed in a sterile fashion.  Landmarks were identified. Peripheral line was placed and sterile dressing applied.      Patient Status:  Patient tolerated the procedure well.  There were no complications.    Emergency Department Course:  1751: The patient arrived to the ED via EMS and was roomed in one of our stabilization room. The patient was   1755: EKG performed as documented above.   1757: I spoke with Dr. Dodge, of cardiology, regarding the patient.   1800: After multiple repeated failed IV attempts by nursing staff, I inserted an EJ line as documented above.   1803: I explained to the patient that he is having a heart attack and would likely require intervention. He verbalized his understanding.   1805: Portable chest x-ray performed.   1806: NS 1 L IV  1807: Brilinta 180 mg Tablet PO  1808: BP 75/55. HR 55. SpO2 99% on nasal cannula.    1808: Patient's family member arrived to the ED.  1813: BP 78/55, HR 53, SpO2 100% on nasal cannula.  1813: Heparin loading dose 4000 units IV  1817: The patient was transferred to the Cath lab here at [facility].   Impression & Plan      CMS Diagnoses: The patient has a STEMI     The patient was evaluated at 1751   Patient was transferred  to the cath lab which was activated due to ECG changes.   ASA  given by medics or taken today    Medical Decision Making:  Luke Henao is a 51 year old male who was seen and evaluated. He presents hypotensive with findings consistent with an inferior ST elevation myocardial infarction. Cardiac cath lab was activated from the field. Vascular access was challenging and therefore I placed an EJ line and he received two liters of IV fluids given his hypotension and inferior STEMI. The patient had received Aspirin from EMS and subsequently received Heparin and Brilinta here. Nitroglycerin was withheld given the inferior nature of his STEMI. I spoke with Dr. Dodge who has agreed to take the patient to the cardiac cath lab. The patient's blood pressure remained low and Dobutamine was ordered however not initiated during my time with the patient. This was sent with him to the cardiac cath lab. I followed the patient to the cath lab and provided direct history to Dr. Dodge. The patient's iSTAT and formal troponin are profoundly elevated and the patient is showing signs of cardiogenic shock. There is no tachycardic to suggest pulmonary embolism and I doubt aortic dissection given his presenting history.     Critical Care time:  was 25 minutes for this patient excluding procedures. During this time, there was bedside evaluation, physician consultation, and management of the STEMI and signs and symptoms of cardiogenic shock.    Diagnosis:    ICD-10-CM    1. ST elevation myocardial infarction (STEMI), unspecified artery (H) I21.3    2. Acute kidney injury (H) N17.9    3. Cardiogenic shock (H) R57.0      Disposition:  The patient was transferred to the cath lab.       Orlin BAER, am serving as a scribe at 5:53 PM on 3/26/2017 to document services personally performed by Dr. Bolanos, based on the provider's statements to me.  Orlin Caro  3/26/2017    EMERGENCY DEPARTMENT       Luis Miguel, Bryant Anderson MD  03/26/17 [id number]

## 2017-03-27 ENCOUNTER — APPOINTMENT (OUTPATIENT)
Dept: GENERAL RADIOLOGY | Facility: CLINIC | Age: 52
DRG: 228 | End: 2017-03-27
Attending: INTERNAL MEDICINE
Payer: COMMERCIAL

## 2017-03-27 ENCOUNTER — APPOINTMENT (OUTPATIENT)
Dept: CT IMAGING | Facility: CLINIC | Age: 52
DRG: 228 | End: 2017-03-27
Attending: INTERNAL MEDICINE
Payer: COMMERCIAL

## 2017-03-27 ENCOUNTER — HOSPITAL ENCOUNTER (INPATIENT)
Facility: CLINIC | Age: 52
LOS: 46 days | Discharge: NURSING, REHAB OR LONG-TERM CARE | DRG: 228 | End: 2017-05-12
Attending: INTERNAL MEDICINE | Admitting: INTERNAL MEDICINE
Payer: COMMERCIAL

## 2017-03-27 ENCOUNTER — APPOINTMENT (OUTPATIENT)
Dept: CARDIOLOGY | Facility: CLINIC | Age: 52
DRG: 228 | End: 2017-03-27
Attending: INTERNAL MEDICINE
Payer: COMMERCIAL

## 2017-03-27 ENCOUNTER — APPOINTMENT (OUTPATIENT)
Dept: CARDIOLOGY | Facility: CLINIC | Age: 52
DRG: 268 | End: 2017-03-27
Attending: INTERNAL MEDICINE
Payer: COMMERCIAL

## 2017-03-27 ENCOUNTER — APPOINTMENT (OUTPATIENT)
Dept: GENERAL RADIOLOGY | Facility: CLINIC | Age: 52
DRG: 268 | End: 2017-03-27
Attending: INTERNAL MEDICINE
Payer: COMMERCIAL

## 2017-03-27 VITALS
WEIGHT: 136.69 LBS | BODY MASS INDEX: 25.15 KG/M2 | DIASTOLIC BLOOD PRESSURE: 70 MMHG | HEIGHT: 62 IN | TEMPERATURE: 100.2 F | SYSTOLIC BLOOD PRESSURE: 139 MMHG | RESPIRATION RATE: 16 BRPM | OXYGEN SATURATION: 100 %

## 2017-03-27 DIAGNOSIS — R57.0 CARDIOGENIC SHOCK (H): Primary | ICD-10-CM

## 2017-03-27 DIAGNOSIS — I82.402 DEEP VEIN THROMBOSIS (DVT) OF LEFT LOWER EXTREMITY, UNSPECIFIED CHRONICITY, UNSPECIFIED VEIN (H): ICD-10-CM

## 2017-03-27 DIAGNOSIS — Z79.4 DIABETES MELLITUS TYPE 2, INSULIN DEPENDENT (H): ICD-10-CM

## 2017-03-27 DIAGNOSIS — G47.09 OTHER INSOMNIA: ICD-10-CM

## 2017-03-27 DIAGNOSIS — E11.9 DIABETES MELLITUS TYPE 2, INSULIN DEPENDENT (H): ICD-10-CM

## 2017-03-27 DIAGNOSIS — I34.0 MITRAL REGURGITATION: ICD-10-CM

## 2017-03-27 DIAGNOSIS — I21.3 ST ELEVATION MYOCARDIAL INFARCTION (STEMI), UNSPECIFIED ARTERY (H): ICD-10-CM

## 2017-03-27 DIAGNOSIS — K29.61 GASTROINTESTINAL HEMORRHAGE ASSOCIATED WITH OTHER GASTRITIS: ICD-10-CM

## 2017-03-27 DIAGNOSIS — I25.10 CORONARY ARTERY DISEASE INVOLVING NATIVE CORONARY ARTERY OF NATIVE HEART WITHOUT ANGINA PECTORIS: ICD-10-CM

## 2017-03-27 DIAGNOSIS — I34.0 MITRAL VALVE INSUFFICIENCY, UNSPECIFIED ETIOLOGY: ICD-10-CM

## 2017-03-27 LAB
ABO + RH BLD: NORMAL
ABO + RH BLD: NORMAL
ALBUMIN SERPL-MCNC: 2.5 G/DL (ref 3.4–5)
ALBUMIN SERPL-MCNC: 2.9 G/DL (ref 3.4–5)
ALBUMIN SERPL-MCNC: 3.2 G/DL (ref 3.4–5)
ALP SERPL-CCNC: 49 U/L (ref 40–150)
ALP SERPL-CCNC: 55 U/L (ref 40–150)
ALP SERPL-CCNC: 61 U/L (ref 40–150)
ALT SERPL W P-5'-P-CCNC: 1794 U/L (ref 0–70)
ALT SERPL W P-5'-P-CCNC: 2339 U/L (ref 0–70)
ALT SERPL W P-5'-P-CCNC: 2564 U/L (ref 0–70)
ANION GAP SERPL CALCULATED.3IONS-SCNC: 13 MMOL/L (ref 3–14)
ANION GAP SERPL CALCULATED.3IONS-SCNC: 18 MMOL/L (ref 3–14)
ANION GAP SERPL CALCULATED.3IONS-SCNC: 24 MMOL/L (ref 3–14)
ANION GAP SERPL CALCULATED.3IONS-SCNC: 7 MMOL/L (ref 3–14)
APTT PPP: 65 SEC (ref 22–37)
AST SERPL W P-5'-P-CCNC: 3165 U/L (ref 0–45)
AST SERPL W P-5'-P-CCNC: 3347 U/L (ref 0–45)
AST SERPL W P-5'-P-CCNC: 4486 U/L (ref 0–45)
BASE DEFICIT BLDA-SCNC: 13.5 MMOL/L
BASE DEFICIT BLDA-SCNC: 14.3 MMOL/L
BASE DEFICIT BLDA-SCNC: 15.1 MMOL/L
BASE DEFICIT BLDA-SCNC: 5.4 MMOL/L
BASE DEFICIT BLDV-SCNC: 1.5 MMOL/L
BASE DEFICIT BLDV-SCNC: 12.3 MMOL/L
BASE DEFICIT BLDV-SCNC: 13.3 MMOL/L
BASE DEFICIT BLDV-SCNC: 16 MMOL/L
BASOPHILS # BLD AUTO: 0.1 10E9/L (ref 0–0.2)
BASOPHILS NFR BLD AUTO: 0.3 %
BILIRUB DIRECT SERPL-MCNC: 0.3 MG/DL (ref 0–0.2)
BILIRUB SERPL-MCNC: 0.4 MG/DL (ref 0.2–1.3)
BILIRUB SERPL-MCNC: 0.7 MG/DL (ref 0.2–1.3)
BILIRUB SERPL-MCNC: 0.8 MG/DL (ref 0.2–1.3)
BLD GP AB SCN SERPL QL: NORMAL
BLOOD BANK CMNT PATIENT-IMP: NORMAL
BUN SERPL-MCNC: 26 MG/DL (ref 7–30)
BUN SERPL-MCNC: 28 MG/DL (ref 7–30)
BUN SERPL-MCNC: 30 MG/DL (ref 7–30)
BUN SERPL-MCNC: 30 MG/DL (ref 7–30)
CA-I BLD-MCNC: 3.9 MG/DL (ref 4.4–5.2)
CA-I BLD-MCNC: 4.2 MG/DL (ref 4.4–5.2)
CALCIUM SERPL-MCNC: 6.4 MG/DL (ref 8.5–10.1)
CALCIUM SERPL-MCNC: 6.5 MG/DL (ref 8.5–10.1)
CALCIUM SERPL-MCNC: 6.8 MG/DL (ref 8.5–10.1)
CALCIUM SERPL-MCNC: 7.3 MG/DL (ref 8.5–10.1)
CHLORIDE SERPL-SCNC: 109 MMOL/L (ref 94–109)
CHLORIDE SERPL-SCNC: 111 MMOL/L (ref 94–109)
CHLORIDE SERPL-SCNC: 112 MMOL/L (ref 94–109)
CHLORIDE SERPL-SCNC: 113 MMOL/L (ref 94–109)
CHOLEST SERPL-MCNC: 128 MG/DL
CO2 SERPL-SCNC: 13 MMOL/L (ref 20–32)
CO2 SERPL-SCNC: 14 MMOL/L (ref 20–32)
CO2 SERPL-SCNC: 20 MMOL/L (ref 20–32)
CO2 SERPL-SCNC: 25 MMOL/L (ref 20–32)
CREAT SERPL-MCNC: 1.42 MG/DL (ref 0.66–1.25)
CREAT SERPL-MCNC: 1.6 MG/DL (ref 0.66–1.25)
CREAT SERPL-MCNC: 1.63 MG/DL (ref 0.66–1.25)
CREAT SERPL-MCNC: 1.68 MG/DL (ref 0.66–1.25)
DIFFERENTIAL METHOD BLD: ABNORMAL
EOSINOPHIL # BLD AUTO: 0 10E9/L (ref 0–0.7)
EOSINOPHIL NFR BLD AUTO: 0.1 %
ERYTHROCYTE [DISTWIDTH] IN BLOOD BY AUTOMATED COUNT: 13 % (ref 10–15)
ERYTHROCYTE [DISTWIDTH] IN BLOOD BY AUTOMATED COUNT: 13.1 % (ref 10–15)
ERYTHROCYTE [DISTWIDTH] IN BLOOD BY AUTOMATED COUNT: 13.1 % (ref 10–15)
ERYTHROCYTE [DISTWIDTH] IN BLOOD BY AUTOMATED COUNT: 13.7 % (ref 10–15)
FIBRINOGEN PPP-MCNC: 351 MG/DL (ref 200–420)
GFR SERPL CREATININE-BSD FRML MDRD: 44 ML/MIN/1.7M2
GFR SERPL CREATININE-BSD FRML MDRD: 45 ML/MIN/1.7M2
GFR SERPL CREATININE-BSD FRML MDRD: 46 ML/MIN/1.7M2
GFR SERPL CREATININE-BSD FRML MDRD: 53 ML/MIN/1.7M2
GLUCOSE BLDC GLUCOMTR-MCNC: 104 MG/DL (ref 70–99)
GLUCOSE BLDC GLUCOMTR-MCNC: 106 MG/DL (ref 70–99)
GLUCOSE BLDC GLUCOMTR-MCNC: 108 MG/DL (ref 70–99)
GLUCOSE BLDC GLUCOMTR-MCNC: 109 MG/DL (ref 70–99)
GLUCOSE BLDC GLUCOMTR-MCNC: 130 MG/DL (ref 70–99)
GLUCOSE BLDC GLUCOMTR-MCNC: 140 MG/DL (ref 70–99)
GLUCOSE BLDC GLUCOMTR-MCNC: 154 MG/DL (ref 70–99)
GLUCOSE BLDC GLUCOMTR-MCNC: 167 MG/DL (ref 70–99)
GLUCOSE BLDC GLUCOMTR-MCNC: 172 MG/DL (ref 70–99)
GLUCOSE BLDC GLUCOMTR-MCNC: 187 MG/DL (ref 70–99)
GLUCOSE BLDC GLUCOMTR-MCNC: 197 MG/DL (ref 70–99)
GLUCOSE BLDC GLUCOMTR-MCNC: 198 MG/DL (ref 70–99)
GLUCOSE BLDC GLUCOMTR-MCNC: 206 MG/DL (ref 70–99)
GLUCOSE BLDC GLUCOMTR-MCNC: 232 MG/DL (ref 70–99)
GLUCOSE BLDC GLUCOMTR-MCNC: 97 MG/DL (ref 70–99)
GLUCOSE BLDC GLUCOMTR-MCNC: 99 MG/DL (ref 70–99)
GLUCOSE SERPL-MCNC: 154 MG/DL (ref 70–99)
GLUCOSE SERPL-MCNC: 178 MG/DL (ref 70–99)
GLUCOSE SERPL-MCNC: 214 MG/DL (ref 70–99)
GLUCOSE SERPL-MCNC: 92 MG/DL (ref 70–99)
HBA1C MFR BLD: 7.5 % (ref 4.3–6)
HCO3 BLD-SCNC: 13 MMOL/L (ref 21–28)
HCO3 BLD-SCNC: 14 MMOL/L (ref 21–28)
HCO3 BLD-SCNC: 14 MMOL/L (ref 21–28)
HCO3 BLD-SCNC: 20 MMOL/L (ref 21–28)
HCO3 BLDV-SCNC: 13 MMOL/L (ref 21–28)
HCO3 BLDV-SCNC: 14 MMOL/L (ref 21–28)
HCO3 BLDV-SCNC: 15 MMOL/L (ref 21–28)
HCO3 BLDV-SCNC: 24 MMOL/L (ref 21–28)
HCT VFR BLD AUTO: 37.5 % (ref 40–53)
HCT VFR BLD AUTO: 37.6 % (ref 40–53)
HCT VFR BLD AUTO: 38 % (ref 40–53)
HCT VFR BLD AUTO: 38.1 % (ref 40–53)
HDLC SERPL-MCNC: 36 MG/DL
HGB BLD-MCNC: 12 G/DL (ref 13.3–17.7)
HGB BLD-MCNC: 12.7 G/DL (ref 13.3–17.7)
HGB BLD-MCNC: 13 G/DL (ref 13.3–17.7)
HGB BLD-MCNC: 13.1 G/DL (ref 13.3–17.7)
IMM GRANULOCYTES # BLD: 0.1 10E9/L (ref 0–0.4)
IMM GRANULOCYTES NFR BLD: 0.3 %
INR PPP: 1.46 (ref 0.86–1.14)
INR PPP: 2.25 (ref 0.86–1.14)
KCT BLD-ACNC: 177 SEC (ref 105–167)
KCT BLD-ACNC: 226 SEC (ref 105–167)
KCT BLD-ACNC: 314 SEC (ref 105–167)
LACTATE BLD-SCNC: 1.7 MMOL/L (ref 0.7–2.1)
LACTATE BLD-SCNC: 10.6 MMOL/L (ref 0.7–2.1)
LACTATE BLD-SCNC: 14.1 MMOL/L (ref 0.7–2.1)
LACTATE BLD-SCNC: 15 MMOL/L (ref 0.7–2.1)
LACTATE BLD-SCNC: 17 MMOL/L (ref 0.7–2.1)
LDLC SERPL CALC-MCNC: 77 MG/DL
LMWH PPP CHRO-ACNC: 0.11 IU/ML
LMWH PPP CHRO-ACNC: 0.15 IU/ML
LYMPHOCYTES # BLD AUTO: 3.2 10E9/L (ref 0.8–5.3)
LYMPHOCYTES NFR BLD AUTO: 20.3 %
MAGNESIUM SERPL-MCNC: 1.9 MG/DL (ref 1.6–2.3)
MAGNESIUM SERPL-MCNC: 2 MG/DL (ref 1.6–2.3)
MCH RBC QN AUTO: 30.3 PG (ref 26.5–33)
MCH RBC QN AUTO: 30.7 PG (ref 26.5–33)
MCH RBC QN AUTO: 30.9 PG (ref 26.5–33)
MCH RBC QN AUTO: 31.1 PG (ref 26.5–33)
MCHC RBC AUTO-ENTMCNC: 31.9 G/DL (ref 31.5–36.5)
MCHC RBC AUTO-ENTMCNC: 33.9 G/DL (ref 31.5–36.5)
MCHC RBC AUTO-ENTMCNC: 34.1 G/DL (ref 31.5–36.5)
MCHC RBC AUTO-ENTMCNC: 34.5 G/DL (ref 31.5–36.5)
MCV RBC AUTO: 90 FL (ref 78–100)
MCV RBC AUTO: 91 FL (ref 78–100)
MCV RBC AUTO: 91 FL (ref 78–100)
MCV RBC AUTO: 95 FL (ref 78–100)
MONOCYTES # BLD AUTO: 1.9 10E9/L (ref 0–1.3)
MONOCYTES NFR BLD AUTO: 11.9 %
MRSA DNA SPEC QL NAA+PROBE: NORMAL
NEUTROPHILS # BLD AUTO: 10.7 10E9/L (ref 1.6–8.3)
NEUTROPHILS NFR BLD AUTO: 67.1 %
NONHDLC SERPL-MCNC: 92 MG/DL
NRBC # BLD AUTO: 0 10*3/UL
NRBC BLD AUTO-RTO: 0 /100
O2/TOTAL GAS SETTING VFR VENT: 100 %
O2/TOTAL GAS SETTING VFR VENT: 40 %
O2/TOTAL GAS SETTING VFR VENT: ABNORMAL %
O2/TOTAL GAS SETTING VFR VENT: ABNORMAL %
OXYHGB MFR BLD: 70 % (ref 92–100)
OXYHGB MFR BLD: 75 % (ref 92–100)
OXYHGB MFR BLD: 98 % (ref 92–100)
OXYHGB MFR BLD: 98 % (ref 92–100)
OXYHGB MFR BLDV: 49 %
OXYHGB MFR BLDV: 56 %
OXYHGB MFR BLDV: 66 %
OXYHGB MFR BLDV: 70 %
PCO2 BLD: 33 MM HG (ref 35–45)
PCO2 BLD: 37 MM HG (ref 35–45)
PCO2 BLD: 46 MM HG (ref 35–45)
PCO2 BLD: 47 MM HG (ref 35–45)
PCO2 BLDV: 39 MM HG (ref 40–50)
PCO2 BLDV: 41 MM HG (ref 40–50)
PCO2 BLDV: 43 MM HG (ref 40–50)
PCO2 BLDV: 44 MM HG (ref 40–50)
PH BLD: 7.08 PH (ref 7.35–7.45)
PH BLD: 7.1 PH (ref 7.35–7.45)
PH BLD: 7.21 PH (ref 7.35–7.45)
PH BLD: 7.34 PH (ref 7.35–7.45)
PH BLDV: 7.08 PH (ref 7.32–7.43)
PH BLDV: 7.17 PH (ref 7.32–7.43)
PH BLDV: 7.18 PH (ref 7.32–7.43)
PH BLDV: 7.35 PH (ref 7.32–7.43)
PHOSPHATE SERPL-MCNC: 3 MG/DL (ref 2.5–4.5)
PHOSPHATE SERPL-MCNC: 4.6 MG/DL (ref 2.5–4.5)
PLATELET # BLD AUTO: 142 10E9/L (ref 150–450)
PLATELET # BLD AUTO: 159 10E9/L (ref 150–450)
PLATELET # BLD AUTO: 177 10E9/L (ref 150–450)
PLATELET # BLD AUTO: 190 10E9/L (ref 150–450)
PO2 BLD: 145 MM HG (ref 80–105)
PO2 BLD: 297 MM HG (ref 80–105)
PO2 BLD: 51 MM HG (ref 80–105)
PO2 BLD: 58 MM HG (ref 80–105)
PO2 BLDV: 29 MM HG (ref 25–47)
PO2 BLDV: 41 MM HG (ref 25–47)
PO2 BLDV: 44 MM HG (ref 25–47)
PO2 BLDV: 46 MM HG (ref 25–47)
POTASSIUM SERPL-SCNC: 3.9 MMOL/L (ref 3.4–5.3)
POTASSIUM SERPL-SCNC: 4.1 MMOL/L (ref 3.4–5.3)
POTASSIUM SERPL-SCNC: 4.8 MMOL/L (ref 3.4–5.3)
POTASSIUM SERPL-SCNC: 5.6 MMOL/L (ref 3.4–5.3)
PROT SERPL-MCNC: 5.2 G/DL (ref 6.8–8.8)
PROT SERPL-MCNC: 6.1 G/DL (ref 6.8–8.8)
PROT SERPL-MCNC: 6.5 G/DL (ref 6.8–8.8)
RBC # BLD AUTO: 3.96 10E12/L (ref 4.4–5.9)
RBC # BLD AUTO: 4.14 10E12/L (ref 4.4–5.9)
RBC # BLD AUTO: 4.21 10E12/L (ref 4.4–5.9)
RBC # BLD AUTO: 4.21 10E12/L (ref 4.4–5.9)
SODIUM SERPL-SCNC: 142 MMOL/L (ref 133–144)
SODIUM SERPL-SCNC: 144 MMOL/L (ref 133–144)
SODIUM SERPL-SCNC: 144 MMOL/L (ref 133–144)
SODIUM SERPL-SCNC: 149 MMOL/L (ref 133–144)
SPECIMEN EXP DATE BLD: NORMAL
SPECIMEN SOURCE: NORMAL
TRIGL SERPL-MCNC: 76 MG/DL
TROPONIN I SERPL-MCNC: ABNORMAL UG/L (ref 0–0.04)
WBC # BLD AUTO: 13.4 10E9/L (ref 4–11)
WBC # BLD AUTO: 15.9 10E9/L (ref 4–11)
WBC # BLD AUTO: 17 10E9/L (ref 4–11)
WBC # BLD AUTO: 17.7 10E9/L (ref 4–11)

## 2017-03-27 PROCEDURE — 93454 CORONARY ARTERY ANGIO S&I: CPT

## 2017-03-27 PROCEDURE — 27210787 ZZH MANIFOLD CR2

## 2017-03-27 PROCEDURE — 25000125 ZZHC RX 250: Performed by: SURGERY

## 2017-03-27 PROCEDURE — 27210136 ZZH KIT CATH ARTERIAL EXT SUPPLY

## 2017-03-27 PROCEDURE — 25000131 ZZH RX MED GY IP 250 OP 636 PS 637: Performed by: INTERNAL MEDICINE

## 2017-03-27 PROCEDURE — 80048 BASIC METABOLIC PNL TOTAL CA: CPT | Performed by: INTERNAL MEDICINE

## 2017-03-27 PROCEDURE — 85027 COMPLETE CBC AUTOMATED: CPT | Performed by: INTERNAL MEDICINE

## 2017-03-27 PROCEDURE — 40000275 ZZH STATISTIC RCP TIME EA 10 MIN

## 2017-03-27 PROCEDURE — 25000125 ZZHC RX 250: Performed by: INTERNAL MEDICINE

## 2017-03-27 PROCEDURE — 74176 CT ABD & PELVIS W/O CONTRAST: CPT

## 2017-03-27 PROCEDURE — 93325 DOPPLER ECHO COLOR FLOW MAPG: CPT | Mod: 26 | Performed by: INTERNAL MEDICINE

## 2017-03-27 PROCEDURE — 27210946 ZZH KIT HC TOTES DISP CR8

## 2017-03-27 PROCEDURE — 40000076 ZZH STATISTIC IABP MONITORING

## 2017-03-27 PROCEDURE — 94003 VENT MGMT INPAT SUBQ DAY: CPT

## 2017-03-27 PROCEDURE — 5A1955Z RESPIRATORY VENTILATION, GREATER THAN 96 CONSECUTIVE HOURS: ICD-10-PCS | Performed by: INTERNAL MEDICINE

## 2017-03-27 PROCEDURE — 85347 COAGULATION TIME ACTIVATED: CPT

## 2017-03-27 PROCEDURE — 85730 THROMBOPLASTIN TIME PARTIAL: CPT | Performed by: INTERNAL MEDICINE

## 2017-03-27 PROCEDURE — 25000128 H RX IP 250 OP 636: Performed by: INTERNAL MEDICINE

## 2017-03-27 PROCEDURE — 25000132 ZZH RX MED GY IP 250 OP 250 PS 637: Performed by: INTERNAL MEDICINE

## 2017-03-27 PROCEDURE — 93010 ELECTROCARDIOGRAM REPORT: CPT | Performed by: INTERNAL MEDICINE

## 2017-03-27 PROCEDURE — 83605 ASSAY OF LACTIC ACID: CPT | Performed by: INTERNAL MEDICINE

## 2017-03-27 PROCEDURE — 93010 ELECTROCARDIOGRAM REPORT: CPT | Mod: 77 | Performed by: INTERNAL MEDICINE

## 2017-03-27 PROCEDURE — C1887 CATHETER, GUIDING: HCPCS

## 2017-03-27 PROCEDURE — 82805 BLOOD GASES W/O2 SATURATION: CPT | Performed by: INTERNAL MEDICINE

## 2017-03-27 PROCEDURE — 40000048 ZZH STATISTIC DAILY SWAN MONITORING

## 2017-03-27 PROCEDURE — 99153 MOD SED SAME PHYS/QHP EA: CPT

## 2017-03-27 PROCEDURE — 93503 INSERT/PLACE HEART CATHETER: CPT

## 2017-03-27 PROCEDURE — C9600 PERC DRUG-EL COR STENT SING: HCPCS

## 2017-03-27 PROCEDURE — 27210760 ZZH DEVICE INFLATION CR7

## 2017-03-27 PROCEDURE — 92928 PRQ TCAT PLMT NTRAC ST 1 LES: CPT | Mod: 76 | Performed by: INTERNAL MEDICINE

## 2017-03-27 PROCEDURE — 40000281 ZZH STATISTIC TRANSPORT TIME EA 15 MIN

## 2017-03-27 PROCEDURE — 71010 XR CHEST PORT 1 VW: CPT

## 2017-03-27 PROCEDURE — 85025 COMPLETE CBC W/AUTO DIFF WBC: CPT | Performed by: INTERNAL MEDICINE

## 2017-03-27 PROCEDURE — 82330 ASSAY OF CALCIUM: CPT | Performed by: INTERNAL MEDICINE

## 2017-03-27 PROCEDURE — 84100 ASSAY OF PHOSPHORUS: CPT | Performed by: INTERNAL MEDICINE

## 2017-03-27 PROCEDURE — C1760 CLOSURE DEV, VASC: HCPCS

## 2017-03-27 PROCEDURE — 40000940 XR CHEST PORT 1 VW

## 2017-03-27 PROCEDURE — 85520 HEPARIN ASSAY: CPT | Performed by: INTERNAL MEDICINE

## 2017-03-27 PROCEDURE — 027337Z DILATION OF CORONARY ARTERY, FOUR OR MORE ARTERIES WITH FOUR OR MORE DRUG-ELUTING INTRALUMINAL DEVICES, PERCUTANEOUS APPROACH: ICD-10-PCS | Performed by: INTERNAL MEDICINE

## 2017-03-27 PROCEDURE — 027237Z DILATION OF CORONARY ARTERY, THREE ARTERIES WITH FOUR OR MORE DRUG-ELUTING INTRALUMINAL DEVICES, PERCUTANEOUS APPROACH: ICD-10-PCS | Performed by: INTERNAL MEDICINE

## 2017-03-27 PROCEDURE — 3E043XZ INTRODUCTION OF VASOPRESSOR INTO CENTRAL VEIN, PERCUTANEOUS APPROACH: ICD-10-PCS | Performed by: INTERNAL MEDICINE

## 2017-03-27 PROCEDURE — 83036 HEMOGLOBIN GLYCOSYLATED A1C: CPT | Performed by: INTERNAL MEDICINE

## 2017-03-27 PROCEDURE — 27211181 ZZH BALLOON TIP PRESSURE CR5

## 2017-03-27 PROCEDURE — C1725 CATH, TRANSLUMIN NON-LASER: HCPCS

## 2017-03-27 PROCEDURE — 5A02210 ASSISTANCE WITH CARDIAC OUTPUT USING BALLOON PUMP, CONTINUOUS: ICD-10-PCS | Performed by: INTERNAL MEDICINE

## 2017-03-27 PROCEDURE — C9606 PERC D-E COR REVASC W AMI S: HCPCS | Mod: RC

## 2017-03-27 PROCEDURE — 80053 COMPREHEN METABOLIC PANEL: CPT | Performed by: INTERNAL MEDICINE

## 2017-03-27 PROCEDURE — 27210998 ZZH ACCESS HEART CATH CR6

## 2017-03-27 PROCEDURE — 80061 LIPID PANEL: CPT | Performed by: INTERNAL MEDICINE

## 2017-03-27 PROCEDURE — 99292 CRITICAL CARE ADDL 30 MIN: CPT | Mod: GC | Performed by: INTERNAL MEDICINE

## 2017-03-27 PROCEDURE — 27211139 ZZHC CATH NEURO CR15

## 2017-03-27 PROCEDURE — 99152 MOD SED SAME PHYS/QHP 5/>YRS: CPT

## 2017-03-27 PROCEDURE — C1894 INTRO/SHEATH, NON-LASER: HCPCS

## 2017-03-27 PROCEDURE — 83735 ASSAY OF MAGNESIUM: CPT | Performed by: INTERNAL MEDICINE

## 2017-03-27 PROCEDURE — 85384 FIBRINOGEN ACTIVITY: CPT | Performed by: INTERNAL MEDICINE

## 2017-03-27 PROCEDURE — 00000146 ZZHCL STATISTIC GLUCOSE BY METER IP

## 2017-03-27 PROCEDURE — 36600 WITHDRAWAL OF ARTERIAL BLOOD: CPT

## 2017-03-27 PROCEDURE — 02PA3QZ REMOVAL OF IMPLANTABLE HEART ASSIST SYSTEM FROM HEART, PERCUTANEOUS APPROACH: ICD-10-PCS | Performed by: INTERNAL MEDICINE

## 2017-03-27 PROCEDURE — 20000004 ZZH R&B ICU UMMC

## 2017-03-27 PROCEDURE — 93308 TTE F-UP OR LMTD: CPT | Mod: 26 | Performed by: INTERNAL MEDICINE

## 2017-03-27 PROCEDURE — 99291 CRITICAL CARE FIRST HOUR: CPT | Mod: GC | Performed by: INTERNAL MEDICINE

## 2017-03-27 PROCEDURE — 84484 ASSAY OF TROPONIN QUANT: CPT | Performed by: INTERNAL MEDICINE

## 2017-03-27 PROCEDURE — 99291 CRITICAL CARE FIRST HOUR: CPT | Performed by: INTERNAL MEDICINE

## 2017-03-27 PROCEDURE — 25000132 ZZH RX MED GY IP 250 OP 250 PS 637

## 2017-03-27 PROCEDURE — 40000196 ZZH STATISTIC RAPCV CVP MONITORING

## 2017-03-27 PROCEDURE — 99233 SBSQ HOSP IP/OBS HIGH 50: CPT | Performed by: INTERNAL MEDICINE

## 2017-03-27 PROCEDURE — 80076 HEPATIC FUNCTION PANEL: CPT | Performed by: INTERNAL MEDICINE

## 2017-03-27 PROCEDURE — 93005 ELECTROCARDIOGRAM TRACING: CPT

## 2017-03-27 PROCEDURE — 87040 BLOOD CULTURE FOR BACTERIA: CPT | Performed by: INTERNAL MEDICINE

## 2017-03-27 PROCEDURE — 36415 COLL VENOUS BLD VENIPUNCTURE: CPT | Performed by: INTERNAL MEDICINE

## 2017-03-27 PROCEDURE — C1769 GUIDE WIRE: HCPCS

## 2017-03-27 PROCEDURE — 27211089 ZZH KIT ACIST INJECTOR CR3

## 2017-03-27 PROCEDURE — C9601 PERC DRUG-EL COR STENT BRAN: HCPCS | Mod: RC

## 2017-03-27 PROCEDURE — B2111ZZ FLUOROSCOPY OF MULTIPLE CORONARY ARTERIES USING LOW OSMOLAR CONTRAST: ICD-10-PCS | Performed by: INTERNAL MEDICINE

## 2017-03-27 PROCEDURE — 25000125 ZZHC RX 250

## 2017-03-27 PROCEDURE — 93308 TTE F-UP OR LMTD: CPT

## 2017-03-27 PROCEDURE — 25000128 H RX IP 250 OP 636

## 2017-03-27 PROCEDURE — 25000128 H RX IP 250 OP 636: Performed by: SURGERY

## 2017-03-27 PROCEDURE — 85610 PROTHROMBIN TIME: CPT | Performed by: INTERNAL MEDICINE

## 2017-03-27 PROCEDURE — 94002 VENT MGMT INPAT INIT DAY: CPT

## 2017-03-27 PROCEDURE — 93321 DOPPLER ECHO F-UP/LMTD STD: CPT | Mod: 26 | Performed by: INTERNAL MEDICINE

## 2017-03-27 PROCEDURE — C1874 STENT, COATED/COV W/DEL SYS: HCPCS

## 2017-03-27 RX ORDER — ONDANSETRON 2 MG/ML
4 INJECTION INTRAMUSCULAR; INTRAVENOUS EVERY 4 HOURS PRN
Status: DISCONTINUED | OUTPATIENT
Start: 2017-03-27 | End: 2017-03-27 | Stop reason: HOSPADM

## 2017-03-27 RX ORDER — PRASUGREL 10 MG/1
10-60 TABLET, FILM COATED ORAL
Status: DISCONTINUED | OUTPATIENT
Start: 2017-03-27 | End: 2017-03-27 | Stop reason: HOSPADM

## 2017-03-27 RX ORDER — NALOXONE HYDROCHLORIDE 0.4 MG/ML
0.4 INJECTION, SOLUTION INTRAMUSCULAR; INTRAVENOUS; SUBCUTANEOUS EVERY 5 MIN PRN
Status: DISCONTINUED | OUTPATIENT
Start: 2017-03-27 | End: 2017-03-27 | Stop reason: HOSPADM

## 2017-03-27 RX ORDER — DEXTROSE MONOHYDRATE 25 G/50ML
25-50 INJECTION, SOLUTION INTRAVENOUS
Status: DISCONTINUED | OUTPATIENT
Start: 2017-03-27 | End: 2017-04-24

## 2017-03-27 RX ORDER — LORAZEPAM 2 MG/ML
.5-2 INJECTION INTRAMUSCULAR EVERY 4 HOURS PRN
Status: DISCONTINUED | OUTPATIENT
Start: 2017-03-27 | End: 2017-03-27 | Stop reason: HOSPADM

## 2017-03-27 RX ORDER — NITROGLYCERIN 20 MG/100ML
.07-2 INJECTION INTRAVENOUS CONTINUOUS PRN
Status: DISCONTINUED | OUTPATIENT
Start: 2017-03-27 | End: 2017-03-27 | Stop reason: HOSPADM

## 2017-03-27 RX ORDER — HYDRALAZINE HYDROCHLORIDE 20 MG/ML
10-20 INJECTION INTRAMUSCULAR; INTRAVENOUS
Status: DISCONTINUED | OUTPATIENT
Start: 2017-03-27 | End: 2017-03-27 | Stop reason: HOSPADM

## 2017-03-27 RX ORDER — ASPIRIN 81 MG/1
81 TABLET ORAL DAILY
Status: DISCONTINUED | OUTPATIENT
Start: 2017-03-28 | End: 2017-03-28

## 2017-03-27 RX ORDER — FLUMAZENIL 0.1 MG/ML
0.2 INJECTION, SOLUTION INTRAVENOUS
Status: DISCONTINUED | OUTPATIENT
Start: 2017-03-27 | End: 2017-03-27 | Stop reason: HOSPADM

## 2017-03-27 RX ORDER — NITROGLYCERIN 5 MG/ML
100-500 VIAL (ML) INTRAVENOUS
Status: DISCONTINUED | OUTPATIENT
Start: 2017-03-27 | End: 2017-03-27 | Stop reason: HOSPADM

## 2017-03-27 RX ORDER — SODIUM NITROPRUSSIDE 25 MG/ML
100-200 INJECTION INTRAVENOUS
Status: DISCONTINUED | OUTPATIENT
Start: 2017-03-27 | End: 2017-03-27 | Stop reason: HOSPADM

## 2017-03-27 RX ORDER — POTASSIUM CHLORIDE 7.45 MG/ML
10 INJECTION INTRAVENOUS
Status: DISCONTINUED | OUTPATIENT
Start: 2017-03-27 | End: 2017-03-27 | Stop reason: HOSPADM

## 2017-03-27 RX ORDER — PIPERACILLIN SODIUM, TAZOBACTAM SODIUM 3; .375 G/15ML; G/15ML
3.38 INJECTION, POWDER, LYOPHILIZED, FOR SOLUTION INTRAVENOUS EVERY 6 HOURS
Status: DISCONTINUED | OUTPATIENT
Start: 2017-03-27 | End: 2017-03-27 | Stop reason: HOSPADM

## 2017-03-27 RX ORDER — DOBUTAMINE HYDROCHLORIDE 200 MG/100ML
2-20 INJECTION INTRAVENOUS CONTINUOUS PRN
Status: DISCONTINUED | OUTPATIENT
Start: 2017-03-27 | End: 2017-03-27 | Stop reason: HOSPADM

## 2017-03-27 RX ORDER — ASPIRIN 81 MG/1
81-324 TABLET, CHEWABLE ORAL
Status: DISCONTINUED | OUTPATIENT
Start: 2017-03-27 | End: 2017-03-27 | Stop reason: HOSPADM

## 2017-03-27 RX ORDER — LIDOCAINE HYDROCHLORIDE 10 MG/ML
30 INJECTION, SOLUTION EPIDURAL; INFILTRATION; INTRACAUDAL; PERINEURAL
Status: DISCONTINUED | OUTPATIENT
Start: 2017-03-27 | End: 2017-03-27 | Stop reason: HOSPADM

## 2017-03-27 RX ORDER — ARGATROBAN 1 MG/ML
150 INJECTION, SOLUTION INTRAVENOUS
Status: DISCONTINUED | OUTPATIENT
Start: 2017-03-27 | End: 2017-03-27 | Stop reason: HOSPADM

## 2017-03-27 RX ORDER — DOPAMINE HYDROCHLORIDE 160 MG/100ML
2-20 INJECTION, SOLUTION INTRAVENOUS CONTINUOUS
Status: DISCONTINUED | OUTPATIENT
Start: 2017-03-27 | End: 2017-03-31 | Stop reason: CLARIF

## 2017-03-27 RX ORDER — METHYLPREDNISOLONE SODIUM SUCCINATE 125 MG/2ML
125 INJECTION, POWDER, LYOPHILIZED, FOR SOLUTION INTRAMUSCULAR; INTRAVENOUS
Status: DISCONTINUED | OUTPATIENT
Start: 2017-03-27 | End: 2017-03-27 | Stop reason: HOSPADM

## 2017-03-27 RX ORDER — FUROSEMIDE 10 MG/ML
20-100 INJECTION INTRAMUSCULAR; INTRAVENOUS
Status: DISCONTINUED | OUTPATIENT
Start: 2017-03-27 | End: 2017-03-27 | Stop reason: HOSPADM

## 2017-03-27 RX ORDER — PROMETHAZINE HYDROCHLORIDE 25 MG/ML
6.25-25 INJECTION, SOLUTION INTRAMUSCULAR; INTRAVENOUS EVERY 4 HOURS PRN
Status: DISCONTINUED | OUTPATIENT
Start: 2017-03-27 | End: 2017-03-27 | Stop reason: HOSPADM

## 2017-03-27 RX ORDER — MORPHINE SULFATE 2 MG/ML
1-2 INJECTION, SOLUTION INTRAMUSCULAR; INTRAVENOUS EVERY 5 MIN PRN
Status: DISCONTINUED | OUTPATIENT
Start: 2017-03-27 | End: 2017-03-27 | Stop reason: HOSPADM

## 2017-03-27 RX ORDER — EPTIFIBATIDE 2 MG/ML
180 INJECTION, SOLUTION INTRAVENOUS EVERY 10 MIN PRN
Status: DISCONTINUED | OUTPATIENT
Start: 2017-03-27 | End: 2017-03-27 | Stop reason: HOSPADM

## 2017-03-27 RX ORDER — HEPARIN SODIUM 1000 [USP'U]/ML
1000-10000 INJECTION, SOLUTION INTRAVENOUS; SUBCUTANEOUS EVERY 5 MIN PRN
Status: DISCONTINUED | OUTPATIENT
Start: 2017-03-27 | End: 2017-03-27 | Stop reason: HOSPADM

## 2017-03-27 RX ORDER — CALCIUM CHLORIDE 100 MG/ML
2 INJECTION INTRAVENOUS; INTRAVENTRICULAR ONCE
Status: DISCONTINUED | OUTPATIENT
Start: 2017-03-27 | End: 2017-03-27

## 2017-03-27 RX ORDER — DOPAMINE HYDROCHLORIDE 160 MG/100ML
2-20 INJECTION, SOLUTION INTRAVENOUS CONTINUOUS PRN
Status: DISCONTINUED | OUTPATIENT
Start: 2017-03-27 | End: 2017-03-27 | Stop reason: HOSPADM

## 2017-03-27 RX ORDER — FUROSEMIDE 10 MG/ML
40 INJECTION INTRAMUSCULAR; INTRAVENOUS ONCE
Status: COMPLETED | OUTPATIENT
Start: 2017-03-27 | End: 2017-03-27

## 2017-03-27 RX ORDER — ACETAMINOPHEN 650 MG/1
650 SUPPOSITORY RECTAL EVERY 4 HOURS PRN
Status: DISCONTINUED | OUTPATIENT
Start: 2017-03-27 | End: 2017-03-27 | Stop reason: HOSPADM

## 2017-03-27 RX ORDER — PHENYLEPHRINE HCL IN 0.9% NACL 1 MG/10 ML
20-100 SYRINGE (ML) INTRAVENOUS
Status: DISCONTINUED | OUTPATIENT
Start: 2017-03-27 | End: 2017-03-27 | Stop reason: HOSPADM

## 2017-03-27 RX ORDER — LIDOCAINE HYDROCHLORIDE 10 MG/ML
1-10 INJECTION, SOLUTION INFILTRATION; PERINEURAL
Status: DISCONTINUED | OUTPATIENT
Start: 2017-03-27 | End: 2017-03-27 | Stop reason: HOSPADM

## 2017-03-27 RX ORDER — DIPHENHYDRAMINE HYDROCHLORIDE 50 MG/ML
25-50 INJECTION INTRAMUSCULAR; INTRAVENOUS
Status: DISCONTINUED | OUTPATIENT
Start: 2017-03-27 | End: 2017-03-27 | Stop reason: HOSPADM

## 2017-03-27 RX ORDER — AMOXICILLIN 250 MG
1-2 CAPSULE ORAL 2 TIMES DAILY
Status: DISCONTINUED | OUTPATIENT
Start: 2017-03-27 | End: 2017-03-28

## 2017-03-27 RX ORDER — NICOTINE POLACRILEX 4 MG
15-30 LOZENGE BUCCAL
Status: DISCONTINUED | OUTPATIENT
Start: 2017-03-27 | End: 2017-04-24

## 2017-03-27 RX ORDER — PIPERACILLIN SODIUM, TAZOBACTAM SODIUM 2; .25 G/10ML; G/10ML
2.25 INJECTION, POWDER, LYOPHILIZED, FOR SOLUTION INTRAVENOUS EVERY 6 HOURS
Status: DISCONTINUED | OUTPATIENT
Start: 2017-03-27 | End: 2017-03-27

## 2017-03-27 RX ORDER — CLOPIDOGREL BISULFATE 75 MG/1
300-600 TABLET ORAL
Status: DISCONTINUED | OUTPATIENT
Start: 2017-03-27 | End: 2017-03-27 | Stop reason: HOSPADM

## 2017-03-27 RX ORDER — NALOXONE HYDROCHLORIDE 0.4 MG/ML
.1-.4 INJECTION, SOLUTION INTRAMUSCULAR; INTRAVENOUS; SUBCUTANEOUS
Status: DISCONTINUED | OUTPATIENT
Start: 2017-03-27 | End: 2017-03-27

## 2017-03-27 RX ORDER — NITROGLYCERIN 5 MG/ML
100-200 VIAL (ML) INTRAVENOUS
Status: DISCONTINUED | OUTPATIENT
Start: 2017-03-27 | End: 2017-03-27 | Stop reason: HOSPADM

## 2017-03-27 RX ORDER — SODIUM CHLORIDE 9 MG/ML
INJECTION, SOLUTION INTRAVENOUS CONTINUOUS
Status: DISCONTINUED | OUTPATIENT
Start: 2017-03-27 | End: 2017-03-27 | Stop reason: HOSPADM

## 2017-03-27 RX ORDER — BUPIVACAINE HYDROCHLORIDE 2.5 MG/ML
1-10 INJECTION, SOLUTION EPIDURAL; INFILTRATION; INTRACAUDAL
Status: DISCONTINUED | OUTPATIENT
Start: 2017-03-27 | End: 2017-03-27 | Stop reason: HOSPADM

## 2017-03-27 RX ORDER — NITROGLYCERIN 0.4 MG/1
0.4 TABLET SUBLINGUAL EVERY 5 MIN PRN
Status: DISCONTINUED | OUTPATIENT
Start: 2017-03-27 | End: 2017-03-27 | Stop reason: HOSPADM

## 2017-03-27 RX ORDER — ENALAPRILAT 1.25 MG/ML
1.25-2.5 INJECTION INTRAVENOUS
Status: DISCONTINUED | OUTPATIENT
Start: 2017-03-27 | End: 2017-03-27 | Stop reason: HOSPADM

## 2017-03-27 RX ORDER — NICARDIPINE HYDROCHLORIDE 2.5 MG/ML
100 INJECTION INTRAVENOUS
Status: DISCONTINUED | OUTPATIENT
Start: 2017-03-27 | End: 2017-03-27 | Stop reason: HOSPADM

## 2017-03-27 RX ORDER — IOPAMIDOL 755 MG/ML
200 INJECTION, SOLUTION INTRAVASCULAR ONCE
Status: COMPLETED | OUTPATIENT
Start: 2017-03-27 | End: 2017-03-27

## 2017-03-27 RX ORDER — PROTAMINE SULFATE 10 MG/ML
1-5 INJECTION, SOLUTION INTRAVENOUS
Status: DISCONTINUED | OUTPATIENT
Start: 2017-03-27 | End: 2017-03-27 | Stop reason: HOSPADM

## 2017-03-27 RX ORDER — DEXTROSE MONOHYDRATE 25 G/50ML
12.5-5 INJECTION, SOLUTION INTRAVENOUS EVERY 30 MIN PRN
Status: DISCONTINUED | OUTPATIENT
Start: 2017-03-27 | End: 2017-03-27 | Stop reason: HOSPADM

## 2017-03-27 RX ORDER — ASPIRIN 81 MG/1
81-324 TABLET, CHEWABLE ORAL
Status: COMPLETED | OUTPATIENT
Start: 2017-03-27 | End: 2017-03-27

## 2017-03-27 RX ORDER — SODIUM CHLORIDE 9 MG/ML
INJECTION, SOLUTION INTRAVENOUS
Status: DISCONTINUED
Start: 2017-03-27 | End: 2017-04-02 | Stop reason: HOSPADM

## 2017-03-27 RX ORDER — ADENOSINE 3 MG/ML
12-12000 INJECTION, SOLUTION INTRAVENOUS
Status: DISCONTINUED | OUTPATIENT
Start: 2017-03-27 | End: 2017-03-27 | Stop reason: HOSPADM

## 2017-03-27 RX ORDER — POTASSIUM CHLORIDE 29.8 MG/ML
20 INJECTION INTRAVENOUS
Status: DISCONTINUED | OUTPATIENT
Start: 2017-03-27 | End: 2017-03-27 | Stop reason: HOSPADM

## 2017-03-27 RX ORDER — FUROSEMIDE 10 MG/ML
20 INJECTION INTRAMUSCULAR; INTRAVENOUS ONCE
Status: DISCONTINUED | OUTPATIENT
Start: 2017-03-27 | End: 2017-03-27

## 2017-03-27 RX ORDER — NIFEDIPINE 10 MG/1
10 CAPSULE ORAL
Status: DISCONTINUED | OUTPATIENT
Start: 2017-03-27 | End: 2017-03-27 | Stop reason: HOSPADM

## 2017-03-27 RX ORDER — FENTANYL CITRATE 50 UG/ML
25 INJECTION, SOLUTION INTRAMUSCULAR; INTRAVENOUS
Status: DISCONTINUED | OUTPATIENT
Start: 2017-03-27 | End: 2017-03-27

## 2017-03-27 RX ORDER — NALOXONE HYDROCHLORIDE 0.4 MG/ML
.1-.4 INJECTION, SOLUTION INTRAMUSCULAR; INTRAVENOUS; SUBCUTANEOUS
Status: DISCONTINUED | OUTPATIENT
Start: 2017-03-27 | End: 2017-05-01

## 2017-03-27 RX ORDER — CLOPIDOGREL BISULFATE 75 MG/1
75 TABLET ORAL DAILY
Status: DISCONTINUED | OUTPATIENT
Start: 2017-03-28 | End: 2017-03-28

## 2017-03-27 RX ORDER — ASPIRIN 325 MG
325 TABLET ORAL
Status: DISCONTINUED | OUTPATIENT
Start: 2017-03-27 | End: 2017-03-27 | Stop reason: HOSPADM

## 2017-03-27 RX ORDER — FENTANYL CITRATE 50 UG/ML
25-50 INJECTION, SOLUTION INTRAMUSCULAR; INTRAVENOUS
Status: DISCONTINUED | OUTPATIENT
Start: 2017-03-27 | End: 2017-03-27 | Stop reason: HOSPADM

## 2017-03-27 RX ORDER — EPTIFIBATIDE 2 MG/ML
2 INJECTION, SOLUTION INTRAVENOUS CONTINUOUS PRN
Status: DISCONTINUED | OUTPATIENT
Start: 2017-03-27 | End: 2017-03-27 | Stop reason: HOSPADM

## 2017-03-27 RX ORDER — PIPERACILLIN SODIUM, TAZOBACTAM SODIUM 4; .5 G/20ML; G/20ML
4.5 INJECTION, POWDER, LYOPHILIZED, FOR SOLUTION INTRAVENOUS EVERY 6 HOURS
Status: DISCONTINUED | OUTPATIENT
Start: 2017-03-27 | End: 2017-03-28

## 2017-03-27 RX ORDER — ASPIRIN 81 MG/1
81 TABLET ORAL DAILY
Status: DISCONTINUED | OUTPATIENT
Start: 2017-03-27 | End: 2017-03-27 | Stop reason: HOSPADM

## 2017-03-27 RX ORDER — PROTAMINE SULFATE 10 MG/ML
25-100 INJECTION, SOLUTION INTRAVENOUS EVERY 5 MIN PRN
Status: DISCONTINUED | OUTPATIENT
Start: 2017-03-27 | End: 2017-03-27 | Stop reason: HOSPADM

## 2017-03-27 RX ORDER — ARGATROBAN 1 MG/ML
350 INJECTION, SOLUTION INTRAVENOUS
Status: DISCONTINUED | OUTPATIENT
Start: 2017-03-27 | End: 2017-03-27 | Stop reason: HOSPADM

## 2017-03-27 RX ORDER — VERAPAMIL HYDROCHLORIDE 2.5 MG/ML
1-2.5 INJECTION, SOLUTION INTRAVENOUS
Status: DISCONTINUED | OUTPATIENT
Start: 2017-03-27 | End: 2017-03-27 | Stop reason: HOSPADM

## 2017-03-27 RX ORDER — CLOPIDOGREL BISULFATE 75 MG/1
75 TABLET ORAL
Status: DISCONTINUED | OUTPATIENT
Start: 2017-03-27 | End: 2017-03-27 | Stop reason: HOSPADM

## 2017-03-27 RX ORDER — FENTANYL CITRATE 50 UG/ML
INJECTION, SOLUTION INTRAMUSCULAR; INTRAVENOUS
Status: COMPLETED
Start: 2017-03-27 | End: 2017-03-27

## 2017-03-27 RX ORDER — FENTANYL CITRATE 50 UG/ML
50-100 INJECTION, SOLUTION INTRAMUSCULAR; INTRAVENOUS
Status: DISCONTINUED | OUTPATIENT
Start: 2017-03-27 | End: 2017-04-04

## 2017-03-27 RX ADMIN — TICAGRELOR 180 MG: 90 TABLET ORAL at 18:02

## 2017-03-27 RX ADMIN — DOPAMINE HYDROCHLORIDE 7 MCG/KG/MIN: 160 INJECTION, SOLUTION INTRAVENOUS at 20:31

## 2017-03-27 RX ADMIN — DOPAMINE HYDROCHLORIDE IN DEXTROSE 12 MCG/KG/MIN: 1.6 INJECTION, SOLUTION INTRAVENOUS at 13:12

## 2017-03-27 RX ADMIN — TICAGRELOR 180 MG: 90 TABLET ORAL at 14:48

## 2017-03-27 RX ADMIN — PHENYLEPHRINE HYDROCHLORIDE 0.5 MCG/KG/MIN: 10 INJECTION INTRAVENOUS at 23:08

## 2017-03-27 RX ADMIN — DOPAMINE HYDROCHLORIDE IN DEXTROSE 14 MCG/KG/MIN: 1.6 INJECTION, SOLUTION INTRAVENOUS at 05:17

## 2017-03-27 RX ADMIN — SODIUM BICARBONATE 100 MEQ: 84 INJECTION, SOLUTION INTRAVENOUS at 23:19

## 2017-03-27 RX ADMIN — FENTANYL CITRATE 100 MCG: 50 INJECTION, SOLUTION INTRAMUSCULAR; INTRAVENOUS at 18:01

## 2017-03-27 RX ADMIN — PIPERACILLIN SODIUM,TAZOBACTAM SODIUM 4.5 G: 4; .5 INJECTION, POWDER, FOR SOLUTION INTRAVENOUS at 22:51

## 2017-03-27 RX ADMIN — EPINEPHRINE 0.07 MCG/KG/MIN: 1 INJECTION PARENTERAL at 21:04

## 2017-03-27 RX ADMIN — SODIUM CHLORIDE 1000 ML: 9 INJECTION, SOLUTION INTRAVENOUS at 13:15

## 2017-03-27 RX ADMIN — EPINEPHRINE 0.03 MCG/KG/MIN: 1 INJECTION PARENTERAL at 14:20

## 2017-03-27 RX ADMIN — FENTANYL CITRATE 25 MCG: 50 INJECTION, SOLUTION INTRAMUSCULAR; INTRAVENOUS at 21:44

## 2017-03-27 RX ADMIN — HUMAN INSULIN 6 UNITS/HR: 100 INJECTION, SOLUTION SUBCUTANEOUS at 23:07

## 2017-03-27 RX ADMIN — PANTOPRAZOLE SODIUM 40 MG: 40 INJECTION, POWDER, FOR SOLUTION INTRAVENOUS at 08:56

## 2017-03-27 RX ADMIN — VANCOMYCIN HYDROCHLORIDE 1500 MG: 10 INJECTION, POWDER, LYOPHILIZED, FOR SOLUTION INTRAVENOUS at 23:28

## 2017-03-27 RX ADMIN — CALCIUM GLUCONATE 1 G: 94 INJECTION, SOLUTION INTRAVENOUS at 00:56

## 2017-03-27 RX ADMIN — IOPAMIDOL 280 ML: 755 INJECTION, SOLUTION INTRAVASCULAR at 18:30

## 2017-03-27 RX ADMIN — NICARDIPINE HYDROCHLORIDE 200 MCG: 2.5 INJECTION INTRAVENOUS at 17:35

## 2017-03-27 RX ADMIN — Medication 4000 UNITS: at 16:10

## 2017-03-27 RX ADMIN — FUROSEMIDE 40 MG: 10 INJECTION, SOLUTION INTRAVENOUS at 23:15

## 2017-03-27 RX ADMIN — ACETAMINOPHEN 650 MG: 650 SUPPOSITORY RECTAL at 09:53

## 2017-03-27 RX ADMIN — CHLORHEXIDINE GLUCONATE 15 ML: 1.2 RINSE ORAL at 08:27

## 2017-03-27 RX ADMIN — Medication 2000 UNITS: at 17:07

## 2017-03-27 RX ADMIN — SODIUM CHLORIDE: 9 INJECTION, SOLUTION INTRAVENOUS at 07:56

## 2017-03-27 RX ADMIN — MIDAZOLAM HYDROCHLORIDE 8 MG/HR: 5 INJECTION, SOLUTION INTRAMUSCULAR; INTRAVENOUS at 20:30

## 2017-03-27 RX ADMIN — FENTANYL CITRATE 25 MCG: 50 INJECTION, SOLUTION INTRAMUSCULAR; INTRAVENOUS at 09:59

## 2017-03-27 RX ADMIN — Medication 2000 UNITS: at 17:33

## 2017-03-27 RX ADMIN — Medication 80 MG: at 12:17

## 2017-03-27 RX ADMIN — Medication 200 MEQ: at 21:43

## 2017-03-27 RX ADMIN — FENTANYL CITRATE 100 MCG: 50 INJECTION, SOLUTION INTRAMUSCULAR; INTRAVENOUS at 16:01

## 2017-03-27 RX ADMIN — FENTANYL CITRATE 25 MCG: 50 INJECTION, SOLUTION INTRAMUSCULAR; INTRAVENOUS at 04:14

## 2017-03-27 RX ADMIN — SODIUM CHLORIDE 2 UNITS/HR: 9 INJECTION, SOLUTION INTRAVENOUS at 00:18

## 2017-03-27 RX ADMIN — FENTANYL CITRATE 25 MCG: 50 INJECTION, SOLUTION INTRAMUSCULAR; INTRAVENOUS at 14:24

## 2017-03-27 RX ADMIN — ASPIRIN 81 MG CHEWABLE TABLET 324 MG: 81 TABLET CHEWABLE at 18:44

## 2017-03-27 RX ADMIN — FENTANYL CITRATE 25 MCG/HR: 50 INJECTION, SOLUTION INTRAMUSCULAR; INTRAVENOUS at 20:49

## 2017-03-27 RX ADMIN — SODIUM BICARBONATE 200 MEQ: 84 INJECTION, SOLUTION INTRAVENOUS at 21:43

## 2017-03-27 ASSESSMENT — ACTIVITIES OF DAILY LIVING (ADL)
RETIRED_EATING: 0-->INDEPENDENT
BATHING: 0-->INDEPENDENT
FALL_HISTORY_WITHIN_LAST_SIX_MONTHS: NO
DRESS: 0-->INDEPENDENT
TOILETING: 0-->INDEPENDENT
SWALLOWING: 0-->SWALLOWS FOODS/LIQUIDS WITHOUT DIFFICULTY
TRANSFERRING: 0-->INDEPENDENT
COGNITION: 0 - NO COGNITION ISSUES REPORTED
AMBULATION: 0-->INDEPENDENT
RETIRED_COMMUNICATION: 0-->UNDERSTANDS/COMMUNICATES WITHOUT DIFFICULTY

## 2017-03-27 NOTE — PROGRESS NOTES
Critical Care Progress Note      03/27/2017    Name: Luke Henao MRN#: [id number]   Age: 51 year old YOB: 1965     Hsptl Day# 1  ICU DAY #1    MV DAY #1             Problem List:   Active Problems:    STEMI (ST elevation myocardial infarction) (H)    Acute MI (H)           Summary/Hospital Course:     Luke Henao is a 51-year-old Czech-speaking male with a history of diabetes, who presented for vomiting and flu-like symptoms. The history is limited because of language issues. Evaluation showed hypotension with inferior ST elevation. He was taken to the cardiac catheterization lab where evaluation showed 3-vessel coronary artery disease, for which the right coronary artery was thought to be the culprit vessel, and stenting was performed. He also needed an intraaortic balloon pulsation device placed, and a temporary venous pacemaker placed. During the procedure, he was agitated and there was hematemesis noted. He was intubated and transferred to the ICU for further management.   3/27: Still on balloon pump, still on dopamine.  Vented.  No further hematemesis. Febrile this AM.      Assessment and plan :     Luke Henao IS a 51 year old male admitted on 3/26/2017 for cardiogenic shock due to posterior MI.   I have personally reviewed the daily labs, imaging studies, cultures and discussed the case with referring physician and consulting physicians.     My assessment and plan by system for this patient is as follows:    Neurology/Psychiatry:   1. Sedation:  midazolam drip, fentanyl prn    Cardiovascular:   1.Cardiogenic shock:  Due to posterior MI.  On balloon pump and dopamine.  Appreciate cardiology input.  Bolusing IV fluids for probably pre-load-dependent RV fxn.  Would avoid nitro.  2.  CAD:  ASA, heparin.  Holding Bblocker in shock.  Holding plavix/berlinta due to hematemesis last night.    Pulmonary/Ventilator Management:   1. Vent dep resp failure:  Intubated in cath lab for agitation.  Will keep  intubated for now in setting of shock.  Lungs relatively clear on CXR.    GI and Nutrition :   1. NPO for now.  2.  PPI bid for recent hematemesis and prophy  3.  Hematemasis:  Likely cristina-gallegos tear in setting of recent vomiting.  No change in hbg, no further bleeding.  4.  Transaminitis:  Suspect shock liver.  Now improved perfusion with hemodynamic supports above.  Trend.    Renal/Fluids/Electrolytes:   1. PEDRO:  Now improving.  Likely due to poor renal perfusion in setting of shock.    Infectious Disease:   1. Fever:  Etiology unclear.  Empiric cvg with vanco/zosyn.  Blood cx and ua neg last night.  Added sputum cx.    Endocrine:   1. Hyperglycemia:  Insulin drip    Hematology/Oncology:   1.  Leukocytosis:  Down trending.  abx empiric as above.  Likely reactive.  2.  hgb stable.  pltlts ok/stable.     IV/Access:   1. Venous access - TLC and sheath  2. Arterial access -  Sheath/IABP  3.  Plan  - central access required and necessary      ICU Prophylaxis:   1. DVT: Hep drip  2. VAP: HOB 30 degrees, chlorhexidine rinse  3. Stress Ulcer: PPI  4. Restraints: Nonviolent soft two point restraints required and necessary for patient safety and continued cares and good effect as patient continues to pull at necessary lines, tubes despite education and distraction. Will readdress daily.   5. IV Access - central access required and necessary for continued patient cares  6. Feeding - npo for now  7. Family Update: not present today  8. Disposition - critically ill        Key goals for next 24 hours:   1. IVF for RV preload  2. Cardiology assessing for possiblefurther PCI intervention  3. Continue hemodynamic support  4.  Empiric abx initiation               Interim History:     Still on balloon pump, still on dopamine.  Vented.  No further hematemesis. Febrile this AM.         Key Medications:       pantoprazole  40 mg Intravenous BID     aspirin EC  81 mg Oral Daily    Or     aspirin  80 mg Rectal Daily    Or     aspirin   80 mg Oral or Feeding Tube Daily     acetylcholine  20 mcg INTRACORONARY Once    Followed by     acetylcholine  50 mcg INTRACORONARY Once    Followed by     acetylcholine  50 mcg INTRACORONARY Once     acetylcholine  20 mcg INTRACORONARY Once    Followed by     acetylcholine  50 mcg INTRACORONARY Once     piperacillin-tazobactam  3.375 g Intravenous Q6H     vancomycin (VANCOCIN) IV  1,500 mg Intravenous Q12H     sodium chloride (PF)  3 mL Intracatheter Q8H     chlorhexidine  15 mL Mouth/Throat Q12H       HEParin 600 Units/hr (03/27/17 0800)     NaCl 10 mL/hr at 03/27/17 0756     adenosine (ADENOSCAN) 90mg in sodium chloride 0.9% 90 mL - for Cath LAB (FFR)       bivalirudin (ANGIOMAX) infusion ADULT       DOBUTamine       DOPamine       eptifibatide       nitroglycerin       nitroprusside (NIPRIDE) infusion ADULT/PEDS GREATER than or EQUAL to 45 kg std conc       phenylephrine IV infusion ADULT       EPINEPHrine IV infusion ADULT 0.03 mcg/kg/min (03/27/17 1420)     Percutaneous Coronary Intervention orders placed (this is information for BPA alerting)       - MEDICATION INSTRUCTIONS -       Reason beta blocker order not selected       Reason ACE/ARB order not selected       Reason statin medication order not selected       midazolam 3 mg/hr (03/27/17 0900)     DOPamine 12 mcg/kg/min (03/27/17 1312)     IV fluid REPLACEMENT ONLY       insulin (regular) 0.2 Units/hr (03/27/17 0900)               Physical Examination:   Temp:  [96.4  F (35.8  C)-101.7  F (38.7  C)] 100.2  F (37.9  C)  Heart Rate:  [] 98  Resp:  [0-18] 16  BP: ()/() 139/70  FiO2 (%):  [40 %-50 %] 40 %  SpO2:  [95 %-100 %] 100 %      Intake/Output Summary (Last 24 hours) at 03/27/17 1514  Last data filed at 03/27/17 1420   Gross per 24 hour   Intake          2912.77 ml   Output             1575 ml   Net          1337.77 ml         Wt Readings from Last 4 Encounters:   03/27/17 62 kg (136 lb 11 oz)   07/15/08 68.5 kg (151 lb)     BP -  Mean:  [] 107  Ventilation Mode: CMV/AC  FiO2 (%): 40 %  Rate Set (breaths/minute): 14 breaths/min  Tidal Volume Set (mL): 450 mL  PEEP (cm H2O): 5 cmH2O  Oxygen Concentration (%): 40 %  Peak Inspiratory Pressure (cm H2O) (Drager Eunice): 10.3  Resp: 16    Recent Labs  Lab 03/27/17  0021   PH 7.34*   PCO2 37   PO2 145*   HCO3 20*   O2PER 50 VENT       GEN: no acute distress   HEENT: head ncat, sclera anicteric, OP patent, trachea midline   PULM: unlabored synchronous with vent, clear anteriorly  CV/COR: RRR S1S2 no gallop,  No rub, no murmur  ABD: soft nontender, hypoactive bowel sounds, no mass  EXT:  Mild Edema, distal pulses intact, groin sites c/d/i.   warm  NEURO: sedated  SKIN: no obvious rash  LINES: clean, dry intact         Data:   All data and imaging reviewed     ROUTINE ICU LABS (Last four results)  CMP  Recent Labs  Lab 03/27/17  0551 03/26/17  2253 03/26/17  1812 03/26/17  1800    142  --  140   POTASSIUM 3.9 5.6*  --  4.6   CHLORIDE 112* 109  --  102   CO2 25 20  --  25   ANIONGAP 7 13  --  13   GLC 92 214*  --  185*   BUN 26 30  --  29   CR 1.42* 1.60*  --  2.33*   GFRESTIMATED 53* 46* 29* 30*   GFRESTBLACK 64 56* 35* 36*   ROB 7.3* 6.8*  --  8.9   MAG 2.0  --   --   --    PHOS 3.0  --   --   --    PROTTOTAL 6.1* 6.5*  --  8.5   ALBUMIN 2.9* 3.2*  --  4.1   BILITOTAL 0.4 0.7  --  0.8   ALKPHOS 55 61  --  79   AST 4486* 3165*  --  1985*   ALT 2339* 1794*  --  981*     CBC  Recent Labs  Lab 03/27/17  1050 03/27/17  0551 03/27/17  0150 03/26/17  2253   WBC 13.4* 15.9* 17.0* 18.6*   RBC 4.14* 4.21* 4.21* 4.40   HGB 12.7* 13.1* 13.0* 13.6   HCT 37.5* 38.0* 38.1* 40.0   MCV 91 90 91 91   MCH 30.7 31.1 30.9 30.9   MCHC 33.9 34.5 34.1 34.0   RDW 13.1 13.1 13.0 12.9    177 190 196     INR  Recent Labs  Lab 03/27/17  0551 03/26/17  2253 03/26/17  1800   INR 1.46* 1.60* 1.13     Arterial Blood Gas  Recent Labs  Lab 03/27/17  0021   PH 7.34*   PCO2 37   PO2 145*   HCO3 20*   O2PER 50 VENT        All cultures:    Recent Labs  Lab 03/26/17  2340 03/26/17  2335   CULT No growth after 6 hours No growth after 6 hours     Recent Results (from the past 24 hour(s))   XR Chest Port 1 View    Narrative    XR CHEST PORT 1 VW 3/26/2017 6:11 PM    HISTORY: Chest pain    COMPARISON: None      Impression    IMPRESSION:   Left mid to low chest partially obscured by overlying device but lungs  appear clear and heart and pulmonary vessels within normal limits. No  evidence for pleural effusion.    NATHAN REESE MD   XR Chest Port 1 View    Narrative    CHEST PORTABLE ONE VIEW   3/26/2017 9:31 PM     HISTORY: Evaluate ET tube.    COMPARISON: 3/26/2017 at 1805 p.m.    FINDINGS: ET tube has been placed since prior study and the tip is in  good position just above the suzanne. Heart size normal. Lungs clear.  There is a line in the right inferior vena cava with tip that extends  into the right ventricle. This is also new.      Impression    IMPRESSION: New support hardware as described since earlier tonight.  Heart and lungs remain normal.    CHRISTIANO SIMPSON MD   XR Chest Port 1 View    Narrative    XR CHEST PORT 1 VW  3/27/2017 6:40 AM     HISTORY:  Evaluate for infiltrate.      Impression    IMPRESSION: Since yesterday, both lungs are better aerated. Tip of the  endotracheal tube lies above the suzanne. Chest is otherwise unchanged.    SPEEDY GARCIA MD     D/w  March of cardiology    CXR:  Pacer lead, ETT and iabp in acceptable position.  Lungs relatively clear to my eye.  EKG:  Sinus.  Persistent inferior lead ST elevation to my eye.    Billing: This patient is critically ill: Yes. Total critical care time today 50 min.

## 2017-03-27 NOTE — IP AVS SNAPSHOT
MRN:[id number]                      After Visit Summary   3/27/2017    Luke Henao    MRN: [id number]           Thank you!     Thank you for choosing [facility] for your care. Our goal is always to provide you with excellent care. Hearing back from our patients is one way we can continue to improve our services. Please take a few minutes to complete the written survey that you may receive in the mail after you visit with us. Thank you!        Patient Information     Date Of Birth          1965        Designated Caregiver       Most Recent Value    Caregiver    Will someone help with your care after discharge? yes    Name of designated caregiver Van    Phone number of caregiver [phone]     [place] [address] [place]      About your hospital stay     You were admitted on:  March 27, 2017 You last received care in the:  [address] [facility]    You were discharged on:  May 12, 2017        Reason for your hospital stay       Dear Luke Henao,      You were hospitalized at [facility] with a heart attack.  Over your hospitalization your condition improved and today you are ready to be discharged to acute rehab.  If you continue your current therapy you should continue to improve but if you develop fever, shortness of breath, light headedness or chest pain please seek medical attention.    Please get the following tests done:  - INR 2-3 days  - BMP 1 week    Please set up appointment with:  - Cardiology 2-4 weeks for medication optimization    It was a pleasure meeting with you today. Thank you for allowing me and my team the privilege of caring for you today.  Please let us know if there is anything else we can do for you so that we can be sure you are leaving completely satisfied with your care experience.    Your hospital unit at the time of discharge is 6c so if you have any questions please call the hospital at [phone] and ask to talk  to a nurse on 6c.        Take care!    Luis Camilo MD, MPH  Internal Medicine PGY-3                  Who to Call     For medical emergencies, please call 911.  For non-urgent questions about your medical care, please call your primary care provider or clinic, None  For questions related to your surgery, please call your surgery clinic        Attending Provider     Provider Poornima Kowalski MD Cardiology    Jamison Swift MD Cardiology       Primary Care Provider    Physician No Ref-Primary       No address on file        After Care Instructions     Activity - Up ad danae           Activity - Up ad danae           Advance Diet as Tolerated       Follow this diet upon discharge: Orders Placed This Encounter      Calorie Counts      Fluid restriction 1500 ML FLUID      Calorie Counts      Calorie Counts      Snacks/Supplements Adult: With Meals      Adult Formula Drip Feeding: Specified Time Peptamen 1.5; Nasoduodenal tube; Goal Rate: 60 mL/hr x 12 hrs (6p-6a); mL/hr; Medication - Tube Feeding Flush Frequency: At least 15-30 mL water before and after medication administration and with tube            Daily weights       Call Provider for weight gain of more than 2 pounds per day or 5 pounds per week.            General info for SNF       Length of Stay Estimate: Short Term Care: Estimated # of Days <30  Condition at Discharge: Improving  Level of care:skilled   Rehabilitation Potential: Good  Admission H&P remains valid and up-to-date: Yes  Recent Chemotherapy: N/A  Use Nursing Home Standing Orders: Yes            Intake and output       Every shift            Mantoux instructions       Give two-step Mantoux (PPD) Per Facility Policy Yes                  Follow-up Appointments     Follow Up and recommended labs and tests       - Follow up with cardiology 2-4 weeks  - Warfarin clinic, INR goal 2-3                  Additional Services     Nutrition Services Adult IP Consult       Reason:  Currently  "on cyclic tube feeds, assistance with transition to PO diet                  Warfarin Instruction     You have started taking a medicine called warfarin. This is a blood-thinning medicine (anticoagulant). It helps prevent and treat blood clots.      Before leaving the hospital, make sure you know how much to take and how long to take it.      You will need regular blood tests to make sure your blood is clotting safely. It is very important to see your doctor for regular blood tests.    Talk to your doctor before taking any new medicine (this includes over-the-counter drugs and herbal products). Many medicines can interact with warfarin. This may cause more bleeding or too much clotting.     Eating a lot of vitamin K--found in green, leafy vegetables--can change the way warfarin works in your body. Do NOT avoid these foods. Instead, try to eat the same amount each day.     Bleeding is the most common side-effect of warfarin. You may notice bleeding gums, a bloody nose, bruises and bleeding longer when you cut yourself. See a doctor at once if:   o You cough up blood  o You find blood in your stool (poop)  o You have a deep cut, or a cut that bleeds longer than 10 minutes   o You have a bad cut, hard fall, accident or hit your head (go to urgent care or the emergency room).    For women who can get pregnant: This medicine can harm an unborn baby. Be very careful not to get pregnant while taking this medicine. If you think you might be pregnant, call your doctor right away.    For more information, read \"Guide to Warfarin Therapy,  the booklet you received in the hospital.        General Recommendations To Control Heart Failure When You Get Home     Instructions To Patients and Families: Please read and check off each of these important instructions as you do them when you get home.           Weight and symptoms      ___ Put a scale in your bathroom  ___ Post a weight chart or calendar next to the scale  ___Weigh " "yourself every day as soon as you you get up in the morning. You should only be wearing your pajamas. Write your weight on the chart/calendar.  ___ Bring your weight chart/calendar with you to all appointments    ___Call your doctor if you gain 2 pounds in 1 day or 5 pounds in 1 week from your \"dry\" weight (baseline weight). Also call your doctor if you have shortness of breath that gets worse over time, leg swelling or fatigue.         Medicines and diet     ___ Make sure to take your medicines as prescribed.    ___Bring a current list of your medicines and all of your medicine bottles with you to all appointments.    ___ Limit fluids if you still have swelling or shortness of breath, or if your doctor tells you to do so.  ___ Eat less than 2000 mg of sodium (salt) every day. Read food labels, and do not add salt to meals.   ___ Heart healthy diet with low fat and low cholesterol          Activity and suggested lifestyle changes    ___ Stay active. Talk to your doctor about an exercise program that is safe for your heart.    ___ Stop smoking. Reduce alcohol use.      ___ Lose weight if you are overweight. Extra weight puts a lot of stress on the heart.          Control for Leg Swelling   ___ Keep your legs elevated (raised) as needed for swelling. If swelling is uncomfortable or elevation doesn t help, ask your doctor about using ACE wrap or Jobst stockings.          Follow-up appointments   ___ Make a [facility] appointment with a basic metabolic panel lab draw 3 to 5 days after you leave the hospital. Call one of the following locations:   [facility] and [facility]  [phone],  [facility] [phone],  [facility]  [phone].     ___ Make sure to take your medications as prescribed and bring an accurate list of your medications and your weight chart/calendar to your follow up appointment at the [facility] for continued " "education and adjustments          What is the [facility] clinic?    The [facility].O.R[facility] (Cardiomyopathy, Optimization, Rehabilitation, Education) Clinic is a heart failure specialty clinic within the [facility]. At [facility] you will work with nurse practitioners to carefully adjust medicines, get education and learn who and when to call if symptoms appear. [facility] nurses specialize in helping you:    better understand your disease.    slow the progress of your disease.    improve the length and quality of your life.    detect future heart problems before they become life threatening.    avoid hospital stays.            Pending Results     Date and Time Order Name Status Description    4/28/2017 0924 AFB Culture Non Blood Preliminary     4/27/2017 1139 AFB Culture Non Blood Preliminary     4/26/2017 1250 AFB Culture Non Blood -- BAL Site 1 Preliminary     4/26/2017 1021 Fungus Culture, non-blood - BAL Site 1 Preliminary             Statement of Approval     Ordered          05/12/17 0848  I have reviewed and agree with all the recommendations and orders detailed in this document.  EFFECTIVE NOW     Approved and electronically signed by:  Ashvin Camilo MD             Admission Information     Date & Time Provider Department Dept. Phone    3/27/2017 Jamison Swift MD [address] [facility] [phone]      Your Vitals Were     Blood Pressure Pulse Temperature Respirations Height Weight    107/78 (BP Location: Right leg) 105 97.4  F (36.3  C) (Oral) 18 1.575 m (5' 2.01\") 54.8 kg (120 lb 12.8 oz)    Pulse Oximetry BMI (Body Mass Index)                98% 22.09 kg/m2          [employer] Information     [employer] lets you send messages to your doctor, view your test results, renew your prescriptions, schedule appointments and more. To sign up, go to [email or web address] . Click on \"Log in\" on the left side of the screen, which will take you to the Welcome page. Then click on \"Sign " "up Now\" on the right side of the page.     You will be asked to enter the access code listed below, as well as some personal information. Please follow the directions to create your username and password.     Your access code is: [email or web address]  Expires: 2017 12:15 PM     Your access code will  in 90 days. If you need help or a new code, please call your [facility] clinic or [phone].        Care EveryWhere ID     This is your Care EveryWhere ID. This could be used by other organizations to access your [facility] medical records  [id number]           Review of your medicines      START taking        Dose / Directions    acetaminophen 325 MG tablet   Commonly known as:  TYLENOL   Used for:  Coronary artery disease involving native coronary artery of native heart without angina pectoris        Dose:  650 mg   Take 2 tablets (650 mg) by mouth every 4 hours as needed for mild pain or fever   Quantity:  100 tablet   Refills:  0       atorvastatin 40 MG tablet   Commonly known as:  LIPITOR   Used for:  Coronary artery disease involving native coronary artery of native heart without angina pectoris        Dose:  40 mg   1 tablet (40 mg) by Oral or Feeding Tube route daily   Quantity:  30 tablet   Refills:  1       bumetanide 1 MG tablet   Commonly known as:  BUMEX   Used for:  Coronary artery disease involving native coronary artery of native heart without angina pectoris        Dose:  1 mg   Take 1 tablet (1 mg) by mouth 2 times daily   Quantity:  30 tablet   Refills:  1       clopidogrel 75 MG tablet   Commonly known as:  PLAVIX   Used for:  ST elevation myocardial infarction (STEMI), unspecified artery (H)        Dose:  75 mg   Take 1 tablet (75 mg) by mouth daily   Quantity:  30 tablet   Refills:  1       digoxin 125 MCG tablet   Commonly known as:  LANOXIN   Used for:  Coronary artery disease involving native coronary artery of native heart without angina pectoris        Dose:  125 mcg   Take 1 tablet (125 " mcg) by mouth daily   Quantity:  30 tablet   Refills:  1       * insulin aspart 100 UNIT/ML injection   Commonly known as:  NovoLOG PEN   Used for:  Diabetes mellitus type 2, insulin dependent (H)        Dose:  1-10 Units   Inject 1-10 Units Subcutaneous 3 times daily (before meals)   Refills:  0       * insulin aspart 100 UNIT/ML injection   Commonly known as:  NovoLOG PEN   Used for:  Diabetes mellitus type 2, insulin dependent (H)        Dose:  1-7 Units   Inject 1-7 Units Subcutaneous At Bedtime   Refills:  0       insulin glargine 100 UNIT/ML injection   Commonly known as:  LANTUS   Used for:  Diabetes mellitus type 2, insulin dependent (H)        Dose:  25 Units   Inject 25 Units Subcutaneous every morning (before breakfast)   Refills:  0       lisinopril 2.5 MG tablet   Commonly known as:  PRINIVIL/Zestril   Used for:  Coronary artery disease involving native coronary artery of native heart without angina pectoris        Dose:  7.5 mg   Take 3 tablets (7.5 mg) by mouth 2 times daily   Quantity:  30 tablet   Refills:  1       melatonin 3 MG tablet   Used for:  Other insomnia        Dose:  3 mg   Take 1 tablet (3 mg) by mouth nightly as needed for sleep   Quantity:  30 tablet   Refills:  1       pantoprazole 40 MG EC tablet   Commonly known as:  PROTONIX   Used for:  Gastrointestinal hemorrhage associated with other gastritis        Dose:  40 mg   Take 1 tablet (40 mg) by mouth daily   Quantity:  30 tablet   Refills:  1       QUEtiapine 25 MG tablet   Commonly known as:  SEROquel   Used for:  Coronary artery disease involving native coronary artery of native heart without angina pectoris        Dose:  12.5 mg   0.5 tablets (12.5 mg) by Oral or Feeding Tube route At Bedtime   Quantity:  60 tablet   Refills:  1       spironolactone 25 MG tablet   Commonly known as:  ALDACTONE   Used for:  Coronary artery disease involving native coronary artery of native heart without angina pectoris        Dose:  12.5 mg   Take  0.5 tablets (12.5 mg) by mouth daily   Quantity:  30 tablet   Refills:  1       Warfarin Therapy Reminder   Used for:  Deep vein thrombosis (DVT) of left lower extremity, unspecified chronicity, unspecified vein (H)        Dose:  1 each   1 each continuous prn   Refills:  0       * Notice:  This list has 2 medication(s) that are the same as other medications prescribed for you. Read the directions carefully, and ask your doctor or other care provider to review them with you.      CONTINUE these medicines which have NOT CHANGED        Dose / Directions    ELOCON 0.1 % cream   Used for:  Bug bites   Generic drug:  mometasone        apply to bug bites qd-bid prn   Quantity:  30g   Refills:  0       OCUFLOX 0.3 % ophthalmic solution   Used for:  Hordeolum   Generic drug:  ofloxacin        1-2 gtts in eye q 4-6 hrs   Quantity:  1   Refills:  0            Where to get your medicines      Some of these will need a paper prescription and others can be bought over the counter. Ask your nurse if you have questions.     You don't need a prescription for these medications     acetaminophen 325 MG tablet    atorvastatin 40 MG tablet    bumetanide 1 MG tablet    clopidogrel 75 MG tablet    digoxin 125 MCG tablet    insulin aspart 100 UNIT/ML injection    insulin aspart 100 UNIT/ML injection    insulin glargine 100 UNIT/ML injection    lisinopril 2.5 MG tablet    melatonin 3 MG tablet    pantoprazole 40 MG EC tablet    QUEtiapine 25 MG tablet    spironolactone 25 MG tablet    Warfarin Therapy Reminder                Protect others around you: Learn how to safely use, store and throw away your medicines at www.disposemymeds.org.             Medication List: This is a list of all your medications and when to take them. Check marks below indicate your daily home schedule. Keep this list as a reference.      Medications           Morning Afternoon Evening Bedtime As Needed    acetaminophen 325 MG tablet   Commonly known as:  TYLENOL    Take 2 tablets (650 mg) by mouth every 4 hours as needed for mild pain or fever   Last time this was given:  975 mg on 5/7/2017  8:20 PM                                   atorvastatin 40 MG tablet   Commonly known as:  LIPITOR   1 tablet (40 mg) by Oral or Feeding Tube route daily   Last time this was given:  40 mg on 5/11/2017  7:49 PM                                   bumetanide 1 MG tablet   Commonly known as:  BUMEX   Take 1 tablet (1 mg) by mouth 2 times daily   Last time this was given:  1 mg on 5/12/2017  8:32 AM                                      clopidogrel 75 MG tablet   Commonly known as:  PLAVIX   Take 1 tablet (75 mg) by mouth daily   Last time this was given:  75 mg on 5/12/2017  8:32 AM                                   digoxin 125 MCG tablet   Commonly known as:  LANOXIN   Take 1 tablet (125 mcg) by mouth daily   Last time this was given:  125 mcg on 5/12/2017  8:31 AM                                   ELOCON 0.1 % cream   apply to bug bites qd-bid prn   Generic drug:  mometasone                                * insulin aspart 100 UNIT/ML injection   Commonly known as:  NovoLOG PEN   Inject 1-10 Units Subcutaneous 3 times daily (before meals)   Last time this was given:  1 Units on 5/11/2017 12:21 PM                                * insulin aspart 100 UNIT/ML injection   Commonly known as:  NovoLOG PEN   Inject 1-7 Units Subcutaneous At Bedtime   Last time this was given:  1 Units on 5/11/2017 12:21 PM                                insulin glargine 100 UNIT/ML injection   Commonly known as:  LANTUS   Inject 25 Units Subcutaneous every morning (before breakfast)   Last time this was given:  25 Units on 5/12/2017  8:35 AM                                lisinopril 2.5 MG tablet   Commonly known as:  PRINIVIL/Zestril   Take 3 tablets (7.5 mg) by mouth 2 times daily   Last time this was given:  7.5 mg on 5/12/2017  8:31 AM                                      melatonin 3 MG tablet   Take 1 tablet (3  mg) by mouth nightly as needed for sleep   Last time this was given:  3 mg on 5/7/2017  9:37 PM                                   OCUFLOX 0.3 % ophthalmic solution   1-2 gtts in eye q 4-6 hrs   Generic drug:  ofloxacin                                pantoprazole 40 MG EC tablet   Commonly known as:  PROTONIX   Take 1 tablet (40 mg) by mouth daily   Last time this was given:  40 mg on 5/12/2017  8:31 AM                                   QUEtiapine 25 MG tablet   Commonly known as:  SEROquel   0.5 tablets (12.5 mg) by Oral or Feeding Tube route At Bedtime   Last time this was given:  12.5 mg on 5/11/2017 10:28 PM                                   spironolactone 25 MG tablet   Commonly known as:  ALDACTONE   Take 0.5 tablets (12.5 mg) by mouth daily   Last time this was given:  12.5 mg on 5/12/2017  8:43 AM                                   Warfarin Therapy Reminder   1 each continuous prn   Last time this was given:  1,000 ml given on 5/8/2017  5:23 PM                                * Notice:  This list has 2 medication(s) that are the same as other medications prescribed for you. Read the directions carefully, and ask your doctor or other care provider to review them with you.

## 2017-03-27 NOTE — PROGRESS NOTES
Care Coordination:    Dr. Mcginnis has requested care coordination to assist in transfer to the [facility], stating pt needs ECHMO, possibly needs RVAD.  (This provider can be reached at [phone]).     + [facility] admissions [phone] was contacted by the ICU unit with the patient information  + The case was accepted, pt will be admitted directly to the cath lab  + SW aware, ambulance ride will be 2:15pm  + EMTALA completed and signed by pt spouse ( used to explain).  + Bedside RN provided report directly to the cath lab.   + Pt left the unit via [facility] transportation at 2:45pm, spouse &  accompanied     Christi Benitez RN, BSN  [facility] Care Coordinator   Mobile Phone: [phone]

## 2017-03-27 NOTE — PROGRESS NOTES
CV Surgery    Patient seen, coronary angiogram reviewed and discussed with Dr. Mcginnis. 52yo M with delayed presentation with inferior STEMI, s/p apsiration thrombectomy and balloon angioplasty to RCA, severe diffuse LAD disease and  LCx, currently on dop gtt, intubated with IABP. Asked to evaluate for possible CABG. He is at a very high risk for CABG at this time and I do not recommend surgical revascularization. Agree with repeat PCI. Full consult note to follow.    Jose Johnston MD

## 2017-03-27 NOTE — PLAN OF CARE
Problem: Restraint for Non-Violent/Non-Self-Destructive Behavior  Goal: Prevent/Manage Potential Problems  Maintain safety of patient and others during period of restraint.  Promote psychological and physical wellbeing.  Prevent injury to skin and involved body parts.  Promote nutrition, hydration, and elimination.   Outcome: No Change  Right wrist, Left wrist and soft restraints restraints continued 3/27/2017     Clinical Justification: Pulling lines, pulling tubes, and pulling equipment  Less Restrictive Alternative: 1:1 patient care, Reorientation, De-escalation, Pain management  Attending Physician Notified: MD ordered restraint,     New orders placed Yes  Length of Order: 1 Day        Dora Morrison

## 2017-03-27 NOTE — PROVIDER NOTIFICATION
Report called to [facility] Cath Lab. Epi drip started as ordered. One time dose of Brilenta given per OG. Report given to transport team. Transferred to [facility].. Pt's wife here and all procedures explained by Dr Newell and consents signed.

## 2017-03-27 NOTE — PLAN OF CARE
Problem: Cardiac: Acute Coronary Syndrome (ACS) (Adult)  Goal: Signs and Symptoms of Listed Potential Problems Will be Absent or Manageable (Cardiac: Acute Coronary Syndrome)  Signs and symptoms of listed potential problems will be absent or manageable by discharge/transition of care (reference Cardiac: Acute Coronary Syndrome (ACS) (Adult) CPG).   Pt developed a murmur and rub. Transferred to [facility] to Cath Lab. Dobutamine, Insulin, Versed, Heparin and Epi drips infusing. Pt's wife here and updated with pt's condition and need to transfer to [place] for further care. Report called to [place] Cath Lab.

## 2017-03-27 NOTE — IP AVS SNAPSHOT
[address] [address]    Phone:  [phone]                                       After Visit Summary   3/27/2017    Luke Henao    MRN: [id number]           After Visit Summary Signature Page     I have received my discharge instructions, and my questions have been answered. I have discussed any challenges I see with this plan with the nurse or doctor.    ..........................................................................................................................................  Patient/Patient Representative Signature      ..........................................................................................................................................  Patient Representative Print Name and Relationship to Patient    ..................................................               ................................................  Date                                            Time    ..........................................................................................................................................  Reviewed by Signature/Title    ...................................................              ..............................................  Date                                                            Time

## 2017-03-27 NOTE — PROGRESS NOTES
[facility] RESPIRATORY NOTE  Date of Admission: 3/26/2017  Date of Intubation (most recent): 3/26/2017  Reason for Mechanical Ventilation: AW protection  Number of Days on Mechanical Ventilation: 2  Met Criteria for Pressure Support Trial: No  Length of Pressure Support Trial: None  Reason for No Pressure Support Trial: Per MD    Significant Events Today: None  Ventilation Mode: CMV/AC  FiO2 (%): 40 %  Rate Set (breaths/minute): 14 breaths/min  Tidal Volume Set (mL): 450 mL  PEEP (cm H2O): 5 cmH2O  Oxygen Concentration (%): 40 %  Peak Inspiratory Pressure (cm H2O) (Drager Eunice): 10.3  Resp: 14    ABG Results:     Recent Labs  Lab 03/27/17  0021   PH 7.34*   PCO2 37   PO2 145*   HCO3 20*   O2PER 50 VENT     ETT appearance on chest x-ray: ET tube in good position    Plan:  Continue full mechanical vent support.  3/27/2017  Heriberto Wilson

## 2017-03-27 NOTE — PLAN OF CARE
Problem: Cardiac: Acute Coronary Syndrome (ACS) (Adult)  Goal: Signs and Symptoms of Listed Potential Problems Will be Absent or Manageable (Cardiac: Acute Coronary Syndrome)  Signs and symptoms of listed potential problems will be absent or manageable by discharge/transition of care (reference Cardiac: Acute Coronary Syndrome (ACS) (Adult) CPG).   Bilateral wrist restraints still needed due to pt pulling at tubes.

## 2017-03-27 NOTE — PROVIDER NOTIFICATION
Murmur and rub noted. Elevated ST.  March here. Cardiac echo done. Stat EKG ordered. Infusing one Liter NS. Pt's wife here and updated.

## 2017-03-27 NOTE — LETTER
Transition Communication Hand-off for Care Transitions to Next Level of Care Provider    Name: Luke Henao  MRN #: [id number]  Primary Care Provider: Physician No Ref-Primary     Primary Clinic: No address on file     Reason for Hospitalization:  cardiac issues  Cardiogenic shock (H)  Heart Failure   Hematochezia  Heart Failure   Mitral Regurge   Mitral regurgitation  Heart Failure  Admit Date/Time: 3/27/2017  3:33 PM  Discharge Date: 5/12/17  Payor Source: Payor: PREFERREDONE / Plan: PEAK ADMIN SERV OPEN ACCESS / Product Type: PPO /     Readmission Assessment Measure (BRYSON) Risk Score/category: High    Plan of Care Goals/Milestone Events:   Patient Concerns: Pt is wanting to rehab to go back to work.   Patient Goals:  Get back to work as soon as possible.         Reason for Communication Hand-off Referral: Other Discharge planning    Discharge Plan:       Concern for non-adherence with plan of care:   Y/N N  Discharge Needs Assessment:  Needs       Most Recent Value    Equipment Currently Used at Home none    Transportation Available car          Already enrolled in Tele-monitoring program and name of program:  Unknown, would benefit  Follow-up specialty is recommended: Yes   Follow-up plan:  Future Appointments  Date Time Provider Department Center   5/12/2017 12:30 PM [facility]   5/12/2017 2:30 PM Christiano Freeman, PT [facility]   5/13/2017 9:30 AM [place] [employer] [facility]   5/14/2017 9:30 AM [place] [employer] [facility]   5/15/2017 10:00 AM [facility]   5/16/2017 10:00 AM [facility]       Any outstanding tests or procedures:        Referrals     Future Labs/Procedures    Nutrition Services Adult IP Consult     Comments:    Reason:  Currently on cyclic tube feeds, assistance with transition to PO diet            Key Recommendations:  None reported today.  Thank you,     MARLA Nowak, APSW  6C Unit Social  Worker  Phone: [phone]  Pager: [phone]  Unit: [phone]      AVS/Discharge Summary is the source of truth; this is a helpful guide for improved communication of patient story

## 2017-03-27 NOTE — H&P
CHIEF COMPLAINT:  Myocardial infarction.      HISTORY OF PRESENT ILLNESS:  The patient is unable to provide any history.  The history has been obtained from the ER notes.  Luke Henao is a 51-year-old Chilean-speaking male with a history of diabetes, who presented for vomiting and flu-like symptoms.  The history is limited because of language issues.  Evaluation showed hypotension with inferior ST elevation.  He was taken to the cardiac catheterization lab where evaluation showed 3-vessel coronary artery disease, for which the right coronary artery was thought to be the culprit vessel, and stenting was performed.  He also needed an intraaortic balloon pulsation device placed, and a temporary venous pacemaker placed.  During the procedure, he was agitated and there was hematemesis noted.  He was intubated and transferred to the ICU for further management.      PAST MEDICAL HISTORY:  Diabetes.      PAST SURGICAL HISTORY:  None.      MEDICATIONS:  Ocuflox, Elocon.      REVIEW OF SYSTEMS:  Cannot be obtained.      FAMILY HISTORY:  Not available.      SOCIAL HISTORY:  Not available.      PHYSICAL EXAMINATION:   GENERAL/VITAL SIGNS:  Young male in no distress who is currently on dopamine with an intraaortic balloon pulsation device in place with mean blood pressure of around 80.  He was also intubated and sedated.  He was on , 14, FiO2 of 60%.   HEENT:  Normocephalic, atraumatic.  Pupils round, reactive to light.   NECK:  No JVD, no lymphadenopathy, no thyromegaly.   HEART:  S1, S2.  No murmurs, rubs or gallops.   CHEST:  Clear to auscultation.   EXTREMITIES:  Right groin balloon pulsation device and transvenous pacing present.  The extremities are warm and well-perfused.      LABS:  Lab studies until now have been reviewed by me, and his creatinine was 2.33, ALT and AST were elevated at 981 and 1985, with troponin of greater than 200.  White count was 14.3, hemoglobin 15.7.      Limited echocardiogram was done,  but no formal reading is available.      X-ray chest shows ET tube in good position without any clear infiltrate.      ASSESSMENT AND PLAN:  A 51-year-old male with history of diabetes, presenting with inferior MI due to RCA lesion.   1.  CNS:  He is currently intubated.  We will put him on sedation protocol with Versed, and p.r.n. pain medications including p.r.n. Dilaudid and, if needed, fentanyl drip.   2.  Respiratory:  Intubated for airway protection with agitation and a question of GI bleed.  We will monitor his ventilation and oxygenation.   3.   Cardiovascular:  Status post PCI to RCA, though has diffuse 3-vessel coronary artery disease.  Cardiology is managing.  He is currently on dopamine and is transvenously paced with intraaortic balloon pulsation device in place.  Perfusion parameters will be monitored.  Labs have been ordered.   4.  Acute kidney injury with additional dye load:  I suspect he is going to have worsening renal failure, though at present he has good urine output.  We will dose the medications renally, and place him on electrolyte protocol as needed.   5.  Question of GI bleed:  Currently, we will monitor his hemoglobin.  Cardiology wants to place him on heparin with a low threshold to stop it.  Serial hemoglobins are being obtained.   6.  He will be on mechanical DVT prophylaxis while on IV unfractionated heparin.   7.  We will be on PPI.      TOTAL TIME:  90 minutes.         HEATHER WALSH MD             D: 2017 22:38   T: 2017 04:15   MT: [id number]      Name:     SYDNIE SIERRA   MRN:      [id number]        Account:      [id number]   :      1965           Admitted:     [id number]      Document: [id number]       cc: [facility]

## 2017-03-27 NOTE — PLAN OF CARE
Problem: Cardiac: Acute Coronary Syndrome (ACS) (Adult)  Goal: Signs and Symptoms of Listed Potential Problems Will be Absent or Manageable (Cardiac: Acute Coronary Syndrome)  Signs and symptoms of listed potential problems will be absent or manageable by discharge/transition of care (reference Cardiac: Acute Coronary Syndrome (ACS) (Adult) CPG).  Outcome: No Change  Pt with cardiogenic shock. On IABP at 1:1. Pt tolerating well. Pt on dopamine to keep MAP >65. Pt started on low dose heparin. Sedated with versed. Pt still arouses and nods appropriately. Will continue to monitor.

## 2017-03-27 NOTE — H&P
[facility]  Cardiology Consult  Patient Name: Luke Henao  Medical Record Number: [id number]  YOB: 1965  PCP: No primary care provider on file.    Date of Service: 3/26/2017  Admit Date/Time: 3/26/2017  5:53 PM    Consult performed at the request of MICU   Reason for consult/Service: STEMI    HPI:  Mr. Luke Henao is a 52yo gentleman w/ h/o DM who was brought in by EMS for inferior STEMI.  He had flu-like symptoms for 2 days.  He became weak last PM and had worsening N/V today leading to the call for EMS.  He was noted to be hypotensive, in shock, in the ED.  He underwent emergent coronary angiography revealing severely diseased LAD system with possible  of the LCx.  His RCA had a 100% thrombotic occlusion.  He received aspiration thrombectomy and POBA of two sites in the proximal and mid-RCA reinstating TIMI3 flow.  Because of the severe LAD and LCx disease and need for CABG, no stent was deployed in the RCA.  He then began vomiting likely due to low perfusion.  He vomited bright red blood during a subsequent episode.  The bivalirudin was discontinued.    Shock continued in the cath lab. Complete heart block developed with HR of 30 prior sharif PCI,  and further hypotesion.  A temporary pacemaker was placed with rate of 80.  An intra-aortic balloon pump was placed.  He required dopamine up to 15 mcg/kg/min to maintain blood pressures.  He became confused and was moving all extremities requiring intubation for airway protection and protection of his vascular access sites.  He was admitted to the MICU for further management.    Labs showed shock liver and acute kidney failure, on presentation. Mentation was also decreased.    No Known Allergies     No past medical history on file.     No past surgical history on file.    Prescriptions Prior to Admission   Medication Sig Dispense Refill Last Dose     ELOCON 0.1 % EX CREA apply to bug bites qd-bid prn 30g 0      OCUFLOX 0.3 % OP SOLN 1-2 gtts in eye  "q 4-6 hrs  1 0      No family history on file.     Social History     Social History     Marital status:      Spouse name: N/A     Number of children: N/A     Years of education: N/A     Occupational History     Not on file.     Social History Main Topics     Smoking status: Current Every Day Smoker     Packs/day: 0.50     Types: Cigarettes     Smokeless tobacco: Not on file     Alcohol use Not on file     Drug use: Not on file     Sexual activity: Not on file     Other Topics Concern     Not on file     Social History Narrative     No narrative on file      Review Of Systems  Skin: negative  Eyes: negative  Ears/Nose/Throat: negative  Respiratory: No shortness of breath, dyspnea on exertion, cough, or hemoptysis  Cardiovascular: as above  Gastrointestinal: negative  Genitourinary: negative  Musculoskeletal: negative  Neurologic: negative  Psychiatric: negative  Hematologic/Lymphatic/Immunologic: negative  Endocrine: negative    Objective:   Vital signs: BP (!) 73/60  Temp 97.7  F (36.5  C)  Resp 11  Ht 1.575 m (5' 2\")  Wt 68.5 kg (151 lb 0.2 oz)  SpO2 100%  BMI 27.62 kg/m2   No intake or output data in the 24 hours ending 03/26/17 2019  Wt Readings from Last 1 Encounters:   03/26/17 68.5 kg (151 lb 0.2 oz)     General: No acute distress, pleasant and cooperative  HEENT: NC/AT, PERRLA, EOMI, oropharynx clear  Neck: supple, carotid pulses present w/o bruits  Chest/Lungs: CTA b/l  Cardiovascular: RRR, +S1/S2, no m/r/g  Abdomen: Soft, NT/ND, no hepatosplenomegaly  Extremities: No cyanosis, clubbing, or edema; pulses intact distally  Muskuloskeletal: Normal muscle bulk and tone  Skin: No wounds or lesions  Neurological: A&Ox3, strength and sensation intact throughout  Psychiatric: Mood and affect congruent and wnl    LABS:  CMP  Recent Labs  Lab 03/26/17  1812 03/26/17  1800   NA  --  140   POTASSIUM  --  4.6   CHLORIDE  --  102   CO2  --  25   ANIONGAP  --  13   GLC  --  185*   BUN  --  29   CR  --  2.33* "   GFRESTIMATED 29* 30*   GFRESTBLACK 35* 36*   ROB  --  8.9   PROTTOTAL  --  8.5   ALBUMIN  --  4.1   BILITOTAL  --  0.8   ALKPHOS  --  79   AST  --  1985*   ALT  --  981*     CBC  Recent Labs  Lab 03/26/17  1800   WBC 14.3*   RBC 5.00   HGB 15.7   HCT 45.1   MCV 90   MCH 31.4   MCHC 34.8   RDW 12.9        INR  Recent Labs  Lab 03/26/17  1800   INR 1.13     Tpn >200    EKG: reviewed, ST elevation in the inferior leads    ECHO: pending;  showing severeRV dysfunction and LV underfilling with EF about 40% and extensive inferior, inferolateral, and septal severe wall motion abnormalities.    Imaging/Angiography: Acute thrombotic occlusion of the RCA, POBA of the prox and mid-RCA with TIMI3 flow.  Severe distal LM, LAD, and  LCx disease.      Assessment:   Mr. Luke Henao is a 50yo gentleman w/ h/o DM who was brought in by EMS for inferior STEMI.  Symptoms began up to 2 days ago and worsened 24hrs ago.  He was taken emergently to the cath lab with cardiogenic shock and bradycardia.  He had thrombotic occlusion of the RCA and severe distal LM, LAD, and LCx disease.  He received aspiration thrombectomy and POBA of the prox and mid-RCA.  He will require further PCI or CABG if his shock resolves.  He was stabilized with a IABP, dopamine, and a temporary pacemaker.  He received angiomax during the procedure and vomited blood after a series of vomiting spells suggestive of a cristina gallegos tear.  He received amiodarone for a short time for SVT, likely induced by the dopamine, but the SVT resolved and amiodarone was discontinued.       Diagnoses:  Inferior STEMI w/ delayed presentation  Severe 3 vessel disease including the distal LM s/p POBA of the prox-to-mid RCA  Cardiogenic shock  SVT while in the cath lab - resolved  Upper GI bleeding c/w cristina gallegos tear    Recommendations:  --Admit to MICU for further evaluation and monitoring  --Maintain ASA 81mg Qday  --Hold ticagrelor for now given bleeding and need for CABG  during this hospital stay  --Holding ACEi/ARB, beta blocker, and high potency statin for now given shock, liver, and renal dysfunction  --Continue low dose heparin gtt given RCA thrombus, but may discontinue if bleeding worsens  --Discuss further lifestyle modification to support cardiovascular health  --Cardiac rehab and nutrition consults  --Cardiology will follow    Thank you for allowing us to participate in the care of this very pleasant patient. Please do not hesitate to call if you have further questions or concerns.     Luke WERNER Henao was seen and examined with Dr. Dodge, attending physician, who agrees with the above assessment and plan.     Gaurang Pringle MD  Interventional Cardiology Fellow  [phone]    Patient seen and examined by me.  Findings and plans discussed with Dr. Pringle- and are as above.    60 minutes critical care time  Phil Dodge M.D  March 26, 2017

## 2017-03-27 NOTE — PHARMACY-VANCOMYCIN DOSING SERVICE
Pharmacy Vancomycin Initial Note  Date of Service 2017  Patient's  1965  51 year old, male    Indication: Sepsis    Current estimated CrCl = Estimated Creatinine Clearance: 55.2 mL/min (based on Cr of 1.42).    Creatinine for last 3 days  3/26/2017:  6:00 PM Creatinine 2.33 mg/dL; 10:53 PM Creatinine 1.60 mg/dL  3/27/2017:  5:51 AM Creatinine 1.42 mg/dL    Recent Vancomycin Level(s) for last 3 days  No results found for requested labs within last 72 hours.      Vancomycin IV Administrations (past 72 hours)      No vancomycin orders with administrations in past 72 hours.                Nephrotoxins and other renal medications (Future)    Start     Dose/Rate Route Frequency Ordered Stop    17 1600  vancomycin (VANCOCIN) 1500 mg in 0.9% NaCl 250 mL PREMIX      1,500 mg Intravenous EVERY 12 HOURS 17 1437      17 1430  piperacillin-tazobactam (ZOSYN) 3.375 g vial to attach to  mL bag      3.375 g  over 1 Hours Intravenous EVERY 6 HOURS 17 1426      17 1400  EPINEPHrine (ADRENALIN) 5 mg in NaCl 0.9 % 250 mL infusion      0.03-0.3 mcg/kg/min × 62 kg  5.6-55.8 mL/hr  Intravenous CONTINUOUS 17 1359      17 1345  phenylephrine (RODRIGO-SYNEPHRINE) 50 mg in NaCl 0.9 % 250 mL infusion      0.5-6 mcg/kg/min × 62 kg  9.3-111.6 mL/hr  Intravenous CONTINUOUS 17 1338      17 1337  vasopressin (PITRESSIN) injection 40 Units      40 Units Intravenous ONCE PRN 17 1338      17 1337  phenylephrine (RODRIGO-SYNEPHRINE) injection  mcg       mcg INTRACORONARY EVERY 3 MIN PRN 17 1338      17 1337  enalaprilat (VASOTEC) injection 1.25-2.5 mg      1.25-2.5 mg Intravenous ONCE PRN 17 1338      17 1337  EPINEPHrine (ADRENALIN) injection 0.3 mg      0.3 mg Subcutaneous EVERY 3 MIN PRN 17 1338      17 1337  EPINEPHrine (ADRENALIN) injection 0.3 mg      0.3 mg Intravenous EVERY 5 MIN PRN 17 1338      17 1337   EPINEPHrine (ADRENALIN) injection 1 mg      1 mg Intravenous EVERY 5 MIN PRN 03/27/17 1338      03/27/17 1337  furosemide (LASIX) injection  mg       mg Intravenous ONCE PRN 03/27/17 1338            Contrast Orders - past 72 hours (72h ago through future)    Start     Dose/Rate Route Frequency Ordered Stop    03/26/17 1945  iopamidol (ISOVUE-370) solution 55 mL      55 mL INTRA-ARTERIAL ONCE 03/26/17 1940 03/26/17 1945                Plan:  1.  Start vancomycin  1500 mg IV q12h.   2.  Goal Trough Level: 15-20 mg/L   3.  Pharmacy will check trough levels as appropriate in 1-3 Days.    4. Serum creatinine levels will be ordered daily for the first week of therapy and at least twice weekly for subsequent weeks.    5. [place] method utilized to dose vancomycin therapy: Method 1    Yesi Lazar

## 2017-03-27 NOTE — ANESTHESIA CARE TRANSFER NOTE
Patient: Luke Henao    * No procedures listed *    Diagnosis: * No pre-op diagnosis entered *  Diagnosis Additional Information: No value filed.    Anesthesia Type:   No value filed.     Note:  Airway :ETT  Destination: CV2 cath lab.        Vitals: (Last set prior to Anesthesia Care Transfer)              Electronically Signed By: RUBI Ivory CRNA  March 26, 2017  8:11 PM

## 2017-03-27 NOTE — PROGRESS NOTES
Cardiology Progress Note  [facility]          Assessment and Plan:   #1 Severe three vessel CAD, S/P delayed presentation with inferior STEMI 3/25/17, with aspiration thrombectomy and restoration of flow.  In shock with 1:1 balloon pump and Dopamine  #2 Episode of vomiting blood in cath lab - no evidence of bleeding at this time  #3 Elevated LFTS  #4 Diabetes with AIC 7.4  #5 Intubated, sedated    -echo again today - holosystolic murmur with rub  -Continue ASA, heparin, balloon pump  -CV surgery to see     ADDENDUM: Had CV surgery see urgently and they do not feel he has good targets or is a good surgical candidate.  There is no evidence of ongoing bleeding and on discussion with the team in the cath lab last night he had several episodes of vomiting without blood before there was a little blood (not a lot). Hemoglobin stable. NG tube to be placed and Plavix load to be given.  Discussed on going support and labs with Dr. Hanley with the heart failure service and she felt he may need more support at this point, possibly ECMO.  I do feel that the RCA and LAD need to be stented and he may need an RVAD in the big picture. Echo showed no VSD/free wall rupture. RV    Will transfer to the [place].  Lactate 1.7, LFTs in the 1000s. Putting out about 30cc urine/hr.  1:1 balloon pump and 13 Dopamine. 40% FI02, , RR 14.  Consented wife through interpretor for stenting, +/- ECMO, +/- Impella.       ADDENDUM:  Patient apparently vomited Ticagrelor in cath lab and was not reloaded as he didn't have NG.  NG placed and 180 Ticagrelor given before he left [facility]    ALIZE Mcginnis MD [facility]             History:   Mr. Luke Henao is a 50yo gentleman w/ h/o DM who was brought in by EMS for inferior STEMI on evening of Jag 3/25/17. He had flu-like symptoms for 2 days and felt he came in with delayed STEMI. He underwent emergent coronary angiography revealing severely diseased  "LAD system with possible  of the LCx. His RCA had a 100% thrombotic occlusion. He received aspiration thrombectomy and POBA of two sites in the proximal and mid-RCA reinstating TIMI3 flow. Because of the severe LAD and LCx disease and need for CABG, no stent was deployed in the RCA.     His stay in the cath lab was complicated by vomiting of bright red blood; bivalirudin was discontinued. He had bradycardia with second degree AVB type 1. A temporary pacemaker was placed with rate of 80. An intra-aortic balloon pump was placed. He required dopamine up to 15 mcg/kg/min to maintain blood pressures. He became confused requiring intubation for airway protection. He was admitted to the MICU for further management.                 Medications:       pantoprazole  40 mg Intravenous BID     aspirin EC  81 mg Oral Daily    Or     aspirin  80 mg Rectal Daily    Or     aspirin  80 mg Oral or Feeding Tube Daily     sodium chloride (PF)  3 mL Intracatheter Q8H     chlorhexidine  15 mL Mouth/Throat Q12H     acetaminophen, lidocaine, lidocaine 4%, sodium chloride (PF), nitroglycerin, HOLD MEDICATION, acetaminophen, HYDROcodone-acetaminophen, naloxone, Percutaneous Coronary Intervention orders placed (this is information for BPA alerting), - MEDICATION INSTRUCTIONS -, Reason beta blocker order not selected, Reason ACE/ARB order not selected, Reason statin medication order not selected, ondansetron **OR** ondansetron, ipratropium - albuterol 0.5 mg/2.5 mg/3 mL, fentaNYL, senna-docusate, IV fluid REPLACEMENT ONLY, glucose **OR** dextrose **OR** glucagon               Physical Exam:   Blood pressure 134/62, temperature 101.7  F (38.7  C), resp. rate 13, height 1.575 m (5' 2\"), weight 62 kg (136 lb 11 oz), SpO2 100 %.  Wt Readings from Last 4 Encounters:   17 62 kg (136 lb 11 oz)   07/15/08 68.5 kg (151 lb)         Vital Sign Ranges  Temperature Temp  Av.6  F (37.6  C)  Min: 96.4  F (35.8  C)  Max: 101.7  F (38.7  C) "   Blood pressure Systolic (24hrs), Av , Min:58 , Max:178        Diastolic (24hrs), Av, Min:35, Max:100      Pulse No Data Recorded   Respirations Resp  Av.9  Min: 0  Max: 18   Pulse oximetry SpO2  Av.9 %  Min: 95 %  Max: 100 %         Intake/Output Summary (Last 24 hours) at 17 1206  Last data filed at 17 1200   Gross per 24 hour   Intake          1807.17 ml   Output             1475 ml   Net           332.17 ml       Constitutional: Intubated, sedated   Lungs: Breath sounds bilaterally   Cardiovascular: Regular rate and rhythm, There is a holosystolic murmur and a rub present   Abdomen: Normal bowel sounds, soft, non-distended   Skin: No rashes, no cyanosis, no edema   Other:           Data:     Recent Labs   Lab Test  17   1050  17   0551  17   0150  17   2253  17   1800   WBC  13.4*  15.9*  17.0*  18.6*  14.3*   HGB  12.7*  13.1*  13.0*  13.6  15.7   MCV  91  90  91  91  90   PLT  159  177  190  196  204   INR   --   1.46*   --   1.60*  1.13      Recent Labs   Lab Test  17   0551  17   2253  17   1800   NA  144  142  140   POTASSIUM  3.9  5.6*  4.6   CHLORIDE  112*  109  102   CO2  25  20  25   BUN  26  30  29   CR  1.42*  1.60*  2.33*   ANIONGAP  7  13  13   ROB  7.3*  6.8*  8.9   GLC  92  214*  185*         Shea Mcginnis MD

## 2017-03-27 NOTE — PLAN OF CARE
Problem: Goal Outcome Summary  Goal: Goal Outcome Summary  OT/CR: Per discussion with RN in AM rounds, pt not appropriate for therapy mobilization at this time.

## 2017-03-27 NOTE — PROCEDURES
Procedure:   Central venous catheter placement        Location:   Left femoral Vein         Indication:   Hypotension, need for IV access         Consent:   Obtained from the family. Omitted due to medical emergency.         Procedure Summary:   US guidance: YES  Protection/Precaution measures: Mask with eye protection, cap, gown, gloves were used: Yes  A time-out was performed. The patient's left groin region was prepped and draped in conventional sterile fashion. Anesthesia was achieved with 1% lidocaine. The introducer needle was inserted into the left femoral vein and venous blood was withdrawn.  The syringe was removed and the guide wire was advanced through the introducer needle. A small incision was made with a scalpel and the introducer needle was removed. A dilator was introduced thru the wire followed by the venous catheter at 18 cm nivia.  Good blood return from each port.         Complications:   No immediate complications.      This procedure is performed by [facility]

## 2017-03-27 NOTE — PROGRESS NOTES
SW:  D: Pt is requiring emergent transportation to the [facility] cath lab.  I: PAULETTE contacted Venancio with [facility] and ordered emergent ALS for transport. ALS arrival expected in 12 minutes with lights and siren. Physician, CC, RN notified.  PCS form completed, faxed and placed on chart. Pt requires vent and 4 drips including dopamine, epinephrine, versed, insulin and heparin. Pt also requires cardiac monitor during transport. EMTALA completed and on pt chart.  P: Pt transfering to [facility] cath lab asap.

## 2017-03-28 ENCOUNTER — APPOINTMENT (OUTPATIENT)
Dept: GENERAL RADIOLOGY | Facility: CLINIC | Age: 52
DRG: 228 | End: 2017-03-28
Attending: INTERNAL MEDICINE
Payer: COMMERCIAL

## 2017-03-28 ENCOUNTER — APPOINTMENT (OUTPATIENT)
Dept: CT IMAGING | Facility: CLINIC | Age: 52
DRG: 228 | End: 2017-03-28
Attending: INTERNAL MEDICINE
Payer: COMMERCIAL

## 2017-03-28 LAB
ABO + RH BLD: NORMAL
ABO + RH BLD: NORMAL
ALBUMIN SERPL-MCNC: 2.8 G/DL (ref 3.4–5)
ALBUMIN SERPL-MCNC: 2.9 G/DL (ref 3.4–5)
ALBUMIN UR-MCNC: 10 MG/DL
ALP SERPL-CCNC: 52 U/L (ref 40–150)
ALP SERPL-CCNC: 55 U/L (ref 40–150)
ALT SERPL W P-5'-P-CCNC: 3172 U/L (ref 0–70)
ALT SERPL W P-5'-P-CCNC: 4381 U/L (ref 0–70)
ANION GAP SERPL CALCULATED.3IONS-SCNC: 10 MMOL/L (ref 3–14)
ANION GAP SERPL CALCULATED.3IONS-SCNC: 11 MMOL/L (ref 3–14)
ANION GAP SERPL CALCULATED.3IONS-SCNC: 12 MMOL/L (ref 3–14)
ANION GAP SERPL CALCULATED.3IONS-SCNC: 21 MMOL/L (ref 3–14)
APPEARANCE UR: CLEAR
APTT PPP: 35 SEC (ref 22–37)
AST SERPL W P-5'-P-CCNC: 3819 U/L (ref 0–45)
AST SERPL W P-5'-P-CCNC: 6528 U/L (ref 0–45)
BASE DEFICIT BLDA-SCNC: 5.7 MMOL/L
BASE EXCESS BLDV CALC-SCNC: 0.7 MMOL/L
BASE EXCESS BLDV CALC-SCNC: 3.8 MMOL/L
BASE EXCESS BLDV CALC-SCNC: 3.9 MMOL/L
BASE EXCESS BLDV CALC-SCNC: 4.5 MMOL/L
BASE EXCESS BLDV CALC-SCNC: 4.5 MMOL/L
BASE EXCESS BLDV CALC-SCNC: 4.9 MMOL/L
BASE EXCESS BLDV CALC-SCNC: 7.9 MMOL/L
BASE EXCESS BLDV CALC-SCNC: 8.7 MMOL/L
BILIRUB SERPL-MCNC: 0.7 MG/DL (ref 0.2–1.3)
BILIRUB SERPL-MCNC: 0.8 MG/DL (ref 0.2–1.3)
BILIRUB UR QL STRIP: NEGATIVE
BLD GP AB SCN SERPL QL: NORMAL
BLOOD BANK CMNT PATIENT-IMP: NORMAL
BUN SERPL-MCNC: 31 MG/DL (ref 7–30)
BUN SERPL-MCNC: 32 MG/DL (ref 7–30)
BUN SERPL-MCNC: 33 MG/DL (ref 7–30)
BUN SERPL-MCNC: 35 MG/DL (ref 7–30)
CA-I BLD-MCNC: 4 MG/DL (ref 4.4–5.2)
CA-I BLD-MCNC: 4.3 MG/DL (ref 4.4–5.2)
CA-I BLD-MCNC: 4.3 MG/DL (ref 4.4–5.2)
CALCIUM SERPL-MCNC: 6.9 MG/DL (ref 8.5–10.1)
CALCIUM SERPL-MCNC: 7.4 MG/DL (ref 8.5–10.1)
CALCIUM SERPL-MCNC: 7.8 MG/DL (ref 8.5–10.1)
CALCIUM SERPL-MCNC: 7.8 MG/DL (ref 8.5–10.1)
CHLORIDE SERPL-SCNC: 109 MMOL/L (ref 94–109)
CHLORIDE SERPL-SCNC: 113 MMOL/L (ref 94–109)
CHLORIDE SERPL-SCNC: 114 MMOL/L (ref 94–109)
CHLORIDE SERPL-SCNC: 114 MMOL/L (ref 94–109)
CO2 SERPL-SCNC: 15 MMOL/L (ref 20–32)
CO2 SERPL-SCNC: 28 MMOL/L (ref 20–32)
CO2 SERPL-SCNC: 28 MMOL/L (ref 20–32)
CO2 SERPL-SCNC: 29 MMOL/L (ref 20–32)
COLOR UR AUTO: ABNORMAL
CREAT SERPL-MCNC: 1.62 MG/DL (ref 0.66–1.25)
CREAT SERPL-MCNC: 1.67 MG/DL (ref 0.66–1.25)
CREAT SERPL-MCNC: 1.74 MG/DL (ref 0.66–1.25)
CREAT SERPL-MCNC: 1.85 MG/DL (ref 0.66–1.25)
ERYTHROCYTE [DISTWIDTH] IN BLOOD BY AUTOMATED COUNT: 13.7 % (ref 10–15)
ERYTHROCYTE [DISTWIDTH] IN BLOOD BY AUTOMATED COUNT: 13.8 % (ref 10–15)
ERYTHROCYTE [DISTWIDTH] IN BLOOD BY AUTOMATED COUNT: 13.8 % (ref 10–15)
ERYTHROCYTE [DISTWIDTH] IN BLOOD BY AUTOMATED COUNT: 13.9 % (ref 10–15)
FIBRINOGEN PPP-MCNC: 375 MG/DL (ref 200–420)
FLUAV+FLUBV RNA SPEC QL NAA+PROBE: NORMAL
FLUAV+FLUBV RNA SPEC QL NAA+PROBE: NORMAL
GFR SERPL CREATININE-BSD FRML MDRD: 39 ML/MIN/1.7M2
GFR SERPL CREATININE-BSD FRML MDRD: 42 ML/MIN/1.7M2
GFR SERPL CREATININE-BSD FRML MDRD: 44 ML/MIN/1.7M2
GFR SERPL CREATININE-BSD FRML MDRD: 45 ML/MIN/1.7M2
GLUCOSE BLDC GLUCOMTR-MCNC: 100 MG/DL (ref 70–99)
GLUCOSE BLDC GLUCOMTR-MCNC: 101 MG/DL (ref 70–99)
GLUCOSE BLDC GLUCOMTR-MCNC: 104 MG/DL (ref 70–99)
GLUCOSE BLDC GLUCOMTR-MCNC: 109 MG/DL (ref 70–99)
GLUCOSE BLDC GLUCOMTR-MCNC: 111 MG/DL (ref 70–99)
GLUCOSE BLDC GLUCOMTR-MCNC: 113 MG/DL (ref 70–99)
GLUCOSE BLDC GLUCOMTR-MCNC: 113 MG/DL (ref 70–99)
GLUCOSE BLDC GLUCOMTR-MCNC: 115 MG/DL (ref 70–99)
GLUCOSE BLDC GLUCOMTR-MCNC: 124 MG/DL (ref 70–99)
GLUCOSE BLDC GLUCOMTR-MCNC: 124 MG/DL (ref 70–99)
GLUCOSE BLDC GLUCOMTR-MCNC: 142 MG/DL (ref 70–99)
GLUCOSE BLDC GLUCOMTR-MCNC: 145 MG/DL (ref 70–99)
GLUCOSE BLDC GLUCOMTR-MCNC: 160 MG/DL (ref 70–99)
GLUCOSE BLDC GLUCOMTR-MCNC: 162 MG/DL (ref 70–99)
GLUCOSE BLDC GLUCOMTR-MCNC: 188 MG/DL (ref 70–99)
GLUCOSE BLDC GLUCOMTR-MCNC: 188 MG/DL (ref 70–99)
GLUCOSE BLDC GLUCOMTR-MCNC: 94 MG/DL (ref 70–99)
GLUCOSE BLDC GLUCOMTR-MCNC: 99 MG/DL (ref 70–99)
GLUCOSE SERPL-MCNC: 114 MG/DL (ref 70–99)
GLUCOSE SERPL-MCNC: 152 MG/DL (ref 70–99)
GLUCOSE SERPL-MCNC: 162 MG/DL (ref 70–99)
GLUCOSE SERPL-MCNC: 226 MG/DL (ref 70–99)
GLUCOSE UR STRIP-MCNC: NEGATIVE MG/DL
GRAM STN SPEC: NORMAL
HCO3 BLD-SCNC: 20 MMOL/L (ref 21–28)
HCO3 BLDV-SCNC: 28 MMOL/L (ref 21–28)
HCO3 BLDV-SCNC: 29 MMOL/L (ref 21–28)
HCO3 BLDV-SCNC: 29 MMOL/L (ref 21–28)
HCO3 BLDV-SCNC: 30 MMOL/L (ref 21–28)
HCO3 BLDV-SCNC: 30 MMOL/L (ref 21–28)
HCO3 BLDV-SCNC: 31 MMOL/L (ref 21–28)
HCO3 BLDV-SCNC: 31 MMOL/L (ref 21–28)
HCO3 BLDV-SCNC: 33 MMOL/L (ref 21–28)
HCT VFR BLD AUTO: 32.2 % (ref 40–53)
HCT VFR BLD AUTO: 34.2 % (ref 40–53)
HCT VFR BLD AUTO: 34.8 % (ref 40–53)
HCT VFR BLD AUTO: 35.1 % (ref 40–53)
HCT VFR BLD AUTO: 37.5 % (ref 40–53)
HGB BLD-MCNC: 10.7 G/DL (ref 13.3–17.7)
HGB BLD-MCNC: 11.5 G/DL (ref 13.3–17.7)
HGB BLD-MCNC: 11.6 G/DL (ref 13.3–17.7)
HGB BLD-MCNC: 11.6 G/DL (ref 13.3–17.7)
HGB BLD-MCNC: 12.2 G/DL (ref 13.3–17.7)
HGB UR QL STRIP: ABNORMAL
INR PPP: 2.12 (ref 0.86–1.14)
INR PPP: 2.14 (ref 0.86–1.14)
INR PPP: 2.3 (ref 0.86–1.14)
INTERPRETATION ECG - MUSE: NORMAL
INTERPRETATION ECG - MUSE: NORMAL
KCT BLD-ACNC: 192 SEC (ref 105–167)
KETONES UR STRIP-MCNC: NEGATIVE MG/DL
LACTATE BLD-SCNC: 10.3 MMOL/L (ref 0.7–2.1)
LACTATE BLD-SCNC: 2.5 MMOL/L (ref 0.7–2.1)
LACTATE BLD-SCNC: 2.6 MMOL/L (ref 0.7–2.1)
LACTATE BLD-SCNC: 3 MMOL/L (ref 0.7–2.1)
LACTATE BLD-SCNC: 3.1 MMOL/L (ref 0.7–2.1)
LACTATE BLD-SCNC: 3.9 MMOL/L (ref 0.7–2.1)
LACTATE BLD-SCNC: 4.4 MMOL/L (ref 0.7–2.1)
LACTATE BLD-SCNC: 4.6 MMOL/L (ref 0.7–2.1)
LACTATE BLD-SCNC: 4.7 MMOL/L (ref 0.7–2.1)
LACTATE BLD-SCNC: 5.9 MMOL/L (ref 0.7–2.1)
LACTATE SERPL-SCNC: 6.8 MMOL/L (ref 0.4–2)
LEUKOCYTE ESTERASE UR QL STRIP: NEGATIVE
Lab: NORMAL
MAGNESIUM SERPL-MCNC: 1.9 MG/DL (ref 1.6–2.3)
MAGNESIUM SERPL-MCNC: 2 MG/DL (ref 1.6–2.3)
MAGNESIUM SERPL-MCNC: 2 MG/DL (ref 1.6–2.3)
MAGNESIUM SERPL-MCNC: 2.6 MG/DL (ref 1.6–2.3)
MCH RBC QN AUTO: 30.1 PG (ref 26.5–33)
MCH RBC QN AUTO: 30.3 PG (ref 26.5–33)
MCH RBC QN AUTO: 30.6 PG (ref 26.5–33)
MCH RBC QN AUTO: 30.7 PG (ref 26.5–33)
MCHC RBC AUTO-ENTMCNC: 32.5 G/DL (ref 31.5–36.5)
MCHC RBC AUTO-ENTMCNC: 33 G/DL (ref 31.5–36.5)
MCHC RBC AUTO-ENTMCNC: 33.2 G/DL (ref 31.5–36.5)
MCHC RBC AUTO-ENTMCNC: 33.6 G/DL (ref 31.5–36.5)
MCV RBC AUTO: 91 FL (ref 78–100)
MCV RBC AUTO: 94 FL (ref 78–100)
MICRO REPORT STATUS: NORMAL
MUCOUS THREADS #/AREA URNS LPF: PRESENT /LPF
NITRATE UR QL: NEGATIVE
O2/TOTAL GAS SETTING VFR VENT: 40 %
O2/TOTAL GAS SETTING VFR VENT: 43 %
O2/TOTAL GAS SETTING VFR VENT: ABNORMAL %
OXYHGB MFR BLDV: 52 %
OXYHGB MFR BLDV: 54 %
OXYHGB MFR BLDV: 55 %
OXYHGB MFR BLDV: 59 %
OXYHGB MFR BLDV: 60 %
OXYHGB MFR BLDV: 61 %
OXYHGB MFR BLDV: 64 %
OXYHGB MFR BLDV: 71 %
PCO2 BLD: 37 MM HG (ref 35–45)
PCO2 BLDV: 34 MM HG (ref 40–50)
PCO2 BLDV: 41 MM HG (ref 40–50)
PCO2 BLDV: 45 MM HG (ref 40–50)
PCO2 BLDV: 47 MM HG (ref 40–50)
PH BLD: 7.33 PH (ref 7.35–7.45)
PH BLDV: 7.41 PH (ref 7.32–7.43)
PH BLDV: 7.41 PH (ref 7.32–7.43)
PH BLDV: 7.42 PH (ref 7.32–7.43)
PH BLDV: 7.43 PH (ref 7.32–7.43)
PH BLDV: 7.45 PH (ref 7.32–7.43)
PH BLDV: 7.45 PH (ref 7.32–7.43)
PH BLDV: 7.48 PH (ref 7.32–7.43)
PH BLDV: 7.57 PH (ref 7.32–7.43)
PH UR STRIP: 5 PH (ref 5–7)
PHOSPHATE SERPL-MCNC: 5.3 MG/DL (ref 2.5–4.5)
PLATELET # BLD AUTO: 111 10E9/L (ref 150–450)
PLATELET # BLD AUTO: 112 10E9/L (ref 150–450)
PLATELET # BLD AUTO: 123 10E9/L (ref 150–450)
PLATELET # BLD AUTO: 126 10E9/L (ref 150–450)
PLATELET # BLD AUTO: 145 10E9/L (ref 150–450)
PO2 BLD: 103 MM HG (ref 80–105)
PO2 BLDV: 29 MM HG (ref 25–47)
PO2 BLDV: 29 MM HG (ref 25–47)
PO2 BLDV: 32 MM HG (ref 25–47)
PO2 BLDV: 32 MM HG (ref 25–47)
PO2 BLDV: 33 MM HG (ref 25–47)
PO2 BLDV: 34 MM HG (ref 25–47)
PO2 BLDV: 35 MM HG (ref 25–47)
PO2 BLDV: 35 MM HG (ref 25–47)
POTASSIUM BLD-SCNC: 3.2 MMOL/L (ref 3.4–5.3)
POTASSIUM BLD-SCNC: 3.3 MMOL/L (ref 3.4–5.3)
POTASSIUM BLD-SCNC: 4 MMOL/L (ref 3.4–5.3)
POTASSIUM BLD-SCNC: 4 MMOL/L (ref 3.4–5.3)
POTASSIUM SERPL-SCNC: 3.1 MMOL/L (ref 3.4–5.3)
POTASSIUM SERPL-SCNC: 3.1 MMOL/L (ref 3.4–5.3)
POTASSIUM SERPL-SCNC: 3.3 MMOL/L (ref 3.4–5.3)
POTASSIUM SERPL-SCNC: 4.1 MMOL/L (ref 3.4–5.3)
PROT SERPL-MCNC: 5.5 G/DL (ref 6.8–8.8)
PROT SERPL-MCNC: 5.6 G/DL (ref 6.8–8.8)
RADIOLOGIST FLAGS: ABNORMAL
RADIOLOGIST FLAGS: ABNORMAL
RBC # BLD AUTO: 3.53 10E12/L (ref 4.4–5.9)
RBC # BLD AUTO: 3.76 10E12/L (ref 4.4–5.9)
RBC # BLD AUTO: 3.85 10E12/L (ref 4.4–5.9)
RBC # BLD AUTO: 3.98 10E12/L (ref 4.4–5.9)
RBC #/AREA URNS AUTO: 14 /HPF (ref 0–2)
RSV RNA SPEC NAA+PROBE: NORMAL
SODIUM BLD-SCNC: 152 MMOL/L (ref 133–144)
SODIUM BLD-SCNC: 153 MMOL/L (ref 133–144)
SODIUM SERPL-SCNC: 146 MMOL/L (ref 133–144)
SODIUM SERPL-SCNC: 153 MMOL/L (ref 133–144)
SP GR UR STRIP: 1.05 (ref 1–1.03)
SPECIMEN EXP DATE BLD: NORMAL
SPECIMEN SOURCE: NORMAL
SPECIMEN SOURCE: NORMAL
SQUAMOUS #/AREA URNS AUTO: <1 /HPF (ref 0–1)
TRANS CELLS #/AREA URNS HPF: <1 /HPF (ref 0–1)
URN SPEC COLLECT METH UR: ABNORMAL
UROBILINOGEN UR STRIP-MCNC: NORMAL MG/DL (ref 0–2)
WBC # BLD AUTO: 16.3 10E9/L (ref 4–11)
WBC # BLD AUTO: 16.7 10E9/L (ref 4–11)
WBC # BLD AUTO: 18.7 10E9/L (ref 4–11)
WBC # BLD AUTO: 19.3 10E9/L (ref 4–11)
WBC #/AREA URNS AUTO: 3 /HPF (ref 0–2)

## 2017-03-28 PROCEDURE — 82803 BLOOD GASES ANY COMBINATION: CPT | Performed by: INTERNAL MEDICINE

## 2017-03-28 PROCEDURE — 83735 ASSAY OF MAGNESIUM: CPT | Performed by: INTERNAL MEDICINE

## 2017-03-28 PROCEDURE — 20000004 ZZH R&B ICU UMMC

## 2017-03-28 PROCEDURE — 87070 CULTURE OTHR SPECIMN AEROBIC: CPT | Performed by: INTERNAL MEDICINE

## 2017-03-28 PROCEDURE — 87185 SC STD ENZYME DETCJ PER NZM: CPT | Performed by: INTERNAL MEDICINE

## 2017-03-28 PROCEDURE — 00000146 ZZHCL STATISTIC GLUCOSE BY METER IP

## 2017-03-28 PROCEDURE — 94003 VENT MGMT INPAT SUBQ DAY: CPT

## 2017-03-28 PROCEDURE — 80053 COMPREHEN METABOLIC PANEL: CPT | Performed by: INTERNAL MEDICINE

## 2017-03-28 PROCEDURE — 80048 BASIC METABOLIC PNL TOTAL CA: CPT | Performed by: INTERNAL MEDICINE

## 2017-03-28 PROCEDURE — 86900 BLOOD TYPING SEROLOGIC ABO: CPT | Performed by: INTERNAL MEDICINE

## 2017-03-28 PROCEDURE — 40000196 ZZH STATISTIC RAPCV CVP MONITORING

## 2017-03-28 PROCEDURE — 40000281 ZZH STATISTIC TRANSPORT TIME EA 15 MIN

## 2017-03-28 PROCEDURE — 71010 XR CHEST PORT 1 VW: CPT | Mod: 76

## 2017-03-28 PROCEDURE — 25000128 H RX IP 250 OP 636: Performed by: INTERNAL MEDICINE

## 2017-03-28 PROCEDURE — 25000132 ZZH RX MED GY IP 250 OP 250 PS 637: Performed by: INTERNAL MEDICINE

## 2017-03-28 PROCEDURE — 85384 FIBRINOGEN ACTIVITY: CPT | Performed by: INTERNAL MEDICINE

## 2017-03-28 PROCEDURE — 40000076 ZZH STATISTIC IABP MONITORING

## 2017-03-28 PROCEDURE — 87077 CULTURE AEROBIC IDENTIFY: CPT | Performed by: INTERNAL MEDICINE

## 2017-03-28 PROCEDURE — 83605 ASSAY OF LACTIC ACID: CPT | Performed by: INTERNAL MEDICINE

## 2017-03-28 PROCEDURE — 82805 BLOOD GASES W/O2 SATURATION: CPT | Performed by: INTERNAL MEDICINE

## 2017-03-28 PROCEDURE — 40000940 XR ABDOMEN PORT F1 VW

## 2017-03-28 PROCEDURE — 25000125 ZZHC RX 250: Performed by: INTERNAL MEDICINE

## 2017-03-28 PROCEDURE — 36415 COLL VENOUS BLD VENIPUNCTURE: CPT | Performed by: INTERNAL MEDICINE

## 2017-03-28 PROCEDURE — 87040 BLOOD CULTURE FOR BACTERIA: CPT | Performed by: INTERNAL MEDICINE

## 2017-03-28 PROCEDURE — 85347 COAGULATION TIME ACTIVATED: CPT

## 2017-03-28 PROCEDURE — 84132 ASSAY OF SERUM POTASSIUM: CPT | Performed by: INTERNAL MEDICINE

## 2017-03-28 PROCEDURE — 36600 WITHDRAWAL OF ARTERIAL BLOOD: CPT

## 2017-03-28 PROCEDURE — 85610 PROTHROMBIN TIME: CPT | Performed by: INTERNAL MEDICINE

## 2017-03-28 PROCEDURE — 70450 CT HEAD/BRAIN W/O DYE: CPT

## 2017-03-28 PROCEDURE — 81001 URINALYSIS AUTO W/SCOPE: CPT | Performed by: INTERNAL MEDICINE

## 2017-03-28 PROCEDURE — 87631 RESP VIRUS 3-5 TARGETS: CPT | Performed by: INTERNAL MEDICINE

## 2017-03-28 PROCEDURE — S0171 BUMETANIDE 0.5 MG: HCPCS | Performed by: INTERNAL MEDICINE

## 2017-03-28 PROCEDURE — 87205 SMEAR GRAM STAIN: CPT | Performed by: INTERNAL MEDICINE

## 2017-03-28 PROCEDURE — 25000128 H RX IP 250 OP 636

## 2017-03-28 PROCEDURE — 84295 ASSAY OF SERUM SODIUM: CPT | Performed by: INTERNAL MEDICINE

## 2017-03-28 PROCEDURE — 85730 THROMBOPLASTIN TIME PARTIAL: CPT | Performed by: INTERNAL MEDICINE

## 2017-03-28 PROCEDURE — 82330 ASSAY OF CALCIUM: CPT | Performed by: INTERNAL MEDICINE

## 2017-03-28 PROCEDURE — 99292 CRITICAL CARE ADDL 30 MIN: CPT | Mod: GC | Performed by: INTERNAL MEDICINE

## 2017-03-28 PROCEDURE — 86850 RBC ANTIBODY SCREEN: CPT | Performed by: INTERNAL MEDICINE

## 2017-03-28 PROCEDURE — 85027 COMPLETE CBC AUTOMATED: CPT | Performed by: INTERNAL MEDICINE

## 2017-03-28 PROCEDURE — 86901 BLOOD TYPING SEROLOGIC RH(D): CPT | Performed by: INTERNAL MEDICINE

## 2017-03-28 PROCEDURE — 74000 XR ABDOMEN PORT F1 VW: CPT

## 2017-03-28 PROCEDURE — 40000048 ZZH STATISTIC DAILY SWAN MONITORING

## 2017-03-28 PROCEDURE — 71010 XR CHEST PORT 1 VW: CPT

## 2017-03-28 PROCEDURE — 99291 CRITICAL CARE FIRST HOUR: CPT | Mod: GC | Performed by: INTERNAL MEDICINE

## 2017-03-28 PROCEDURE — 25000125 ZZHC RX 250

## 2017-03-28 PROCEDURE — 87186 SC STD MICRODIL/AGAR DIL: CPT | Performed by: INTERNAL MEDICINE

## 2017-03-28 PROCEDURE — 40000275 ZZH STATISTIC RCP TIME EA 10 MIN

## 2017-03-28 RX ORDER — ASPIRIN 81 MG/1
81 TABLET, CHEWABLE ORAL DAILY
Status: DISCONTINUED | OUTPATIENT
Start: 2017-03-29 | End: 2017-04-20

## 2017-03-28 RX ORDER — POTASSIUM CHLORIDE 1.5 G/1.58G
20-40 POWDER, FOR SOLUTION ORAL
Status: DISCONTINUED | OUTPATIENT
Start: 2017-03-28 | End: 2017-04-08

## 2017-03-28 RX ORDER — DEXTROSE MONOHYDRATE 50 MG/ML
INJECTION, SOLUTION INTRAVENOUS CONTINUOUS
Status: DISCONTINUED | OUTPATIENT
Start: 2017-03-28 | End: 2017-03-29

## 2017-03-28 RX ORDER — FUROSEMIDE 10 MG/ML
40 INJECTION INTRAMUSCULAR; INTRAVENOUS ONCE
Status: COMPLETED | OUTPATIENT
Start: 2017-03-28 | End: 2017-03-28

## 2017-03-28 RX ORDER — POTASSIUM CHLORIDE 750 MG/1
20-40 TABLET, EXTENDED RELEASE ORAL
Status: DISCONTINUED | OUTPATIENT
Start: 2017-03-28 | End: 2017-04-08

## 2017-03-28 RX ORDER — POTASSIUM CHLORIDE 7.45 MG/ML
10 INJECTION INTRAVENOUS
Status: DISCONTINUED | OUTPATIENT
Start: 2017-03-28 | End: 2017-04-08

## 2017-03-28 RX ORDER — DOBUTAMINE HYDROCHLORIDE 200 MG/100ML
5 INJECTION INTRAVENOUS CONTINUOUS
Status: DISCONTINUED | OUTPATIENT
Start: 2017-03-28 | End: 2017-04-04

## 2017-03-28 RX ORDER — CLOPIDOGREL BISULFATE 75 MG/1
75 TABLET ORAL DAILY
Status: DISCONTINUED | OUTPATIENT
Start: 2017-03-29 | End: 2017-04-15

## 2017-03-28 RX ORDER — BUMETANIDE 0.25 MG/ML
4 INJECTION INTRAMUSCULAR; INTRAVENOUS ONCE
Status: COMPLETED | OUTPATIENT
Start: 2017-03-28 | End: 2017-03-28

## 2017-03-28 RX ORDER — MAGNESIUM SULFATE HEPTAHYDRATE 40 MG/ML
4 INJECTION, SOLUTION INTRAVENOUS EVERY 4 HOURS PRN
Status: DISCONTINUED | OUTPATIENT
Start: 2017-03-28 | End: 2017-03-28

## 2017-03-28 RX ORDER — POTASSIUM CHLORIDE 1.5 G/1.58G
40 POWDER, FOR SOLUTION ORAL ONCE
Status: COMPLETED | OUTPATIENT
Start: 2017-03-28 | End: 2017-03-28

## 2017-03-28 RX ORDER — MAGNESIUM SULFATE HEPTAHYDRATE 40 MG/ML
4 INJECTION, SOLUTION INTRAVENOUS EVERY 4 HOURS PRN
Status: DISCONTINUED | OUTPATIENT
Start: 2017-03-28 | End: 2017-05-12 | Stop reason: HOSPADM

## 2017-03-28 RX ORDER — POTASSIUM CHLORIDE 29.8 MG/ML
20 INJECTION INTRAVENOUS
Status: DISCONTINUED | OUTPATIENT
Start: 2017-03-28 | End: 2017-04-08

## 2017-03-28 RX ORDER — AMOXICILLIN 250 MG
1-2 CAPSULE ORAL 2 TIMES DAILY
Status: DISCONTINUED | OUTPATIENT
Start: 2017-03-28 | End: 2017-04-13

## 2017-03-28 RX ADMIN — CLOPIDOGREL 75 MG: 75 TABLET, FILM COATED ORAL at 07:37

## 2017-03-28 RX ADMIN — HYDROCORTISONE SODIUM SUCCINATE 50 MG: 100 INJECTION, POWDER, FOR SOLUTION INTRAMUSCULAR; INTRAVENOUS at 02:02

## 2017-03-28 RX ADMIN — BUMETANIDE 4 MG: 0.25 INJECTION INTRAMUSCULAR; INTRAVENOUS at 00:57

## 2017-03-28 RX ADMIN — DOPAMINE HYDROCHLORIDE 8 MCG/KG/MIN: 160 INJECTION, SOLUTION INTRAVENOUS at 17:14

## 2017-03-28 RX ADMIN — PIPERACILLIN SODIUM,TAZOBACTAM SODIUM 4.5 G: 4; .5 INJECTION, POWDER, FOR SOLUTION INTRAVENOUS at 10:08

## 2017-03-28 RX ADMIN — POTASSIUM CHLORIDE 20 MEQ: 29.8 INJECTION, SOLUTION INTRAVENOUS at 22:14

## 2017-03-28 RX ADMIN — HYDROCORTISONE SODIUM SUCCINATE 50 MG: 100 INJECTION, POWDER, FOR SOLUTION INTRAMUSCULAR; INTRAVENOUS at 08:31

## 2017-03-28 RX ADMIN — POTASSIUM CHLORIDE 20 MEQ: 29.8 INJECTION, SOLUTION INTRAVENOUS at 06:15

## 2017-03-28 RX ADMIN — SENNOSIDES AND DOCUSATE SODIUM 2 TABLET: 8.6; 5 TABLET ORAL at 20:35

## 2017-03-28 RX ADMIN — PIPERACILLIN SODIUM,TAZOBACTAM SODIUM 4.5 G: 4; .5 INJECTION, POWDER, FOR SOLUTION INTRAVENOUS at 16:10

## 2017-03-28 RX ADMIN — CALCIUM CHLORIDE 1 G: 100 INJECTION, SOLUTION INTRAVENOUS at 00:02

## 2017-03-28 RX ADMIN — DOBUTAMINE HYDROCHLORIDE 4 MCG/KG/MIN: 200 INJECTION INTRAVENOUS at 19:40

## 2017-03-28 RX ADMIN — MIDAZOLAM HYDROCHLORIDE 2 MG/HR: 5 INJECTION, SOLUTION INTRAMUSCULAR; INTRAVENOUS at 13:03

## 2017-03-28 RX ADMIN — SENNOSIDES AND DOCUSATE SODIUM 1 TABLET: 8.6; 5 TABLET ORAL at 07:37

## 2017-03-28 RX ADMIN — POTASSIUM CHLORIDE 20 MEQ: 29.8 INJECTION, SOLUTION INTRAVENOUS at 20:46

## 2017-03-28 RX ADMIN — PIPERACILLIN SODIUM,TAZOBACTAM SODIUM 4.5 G: 4; .5 INJECTION, POWDER, FOR SOLUTION INTRAVENOUS at 03:42

## 2017-03-28 RX ADMIN — POTASSIUM CHLORIDE 40 MEQ: 1.5 POWDER, FOR SOLUTION ORAL at 13:17

## 2017-03-28 RX ADMIN — DEXTROSE MONOHYDRATE: 50 INJECTION, SOLUTION INTRAVENOUS at 16:10

## 2017-03-28 RX ADMIN — PIPERACILLIN SODIUM,TAZOBACTAM SODIUM 4.5 G: 4; .5 INJECTION, POWDER, FOR SOLUTION INTRAVENOUS at 22:32

## 2017-03-28 RX ADMIN — HUMAN INSULIN 2 UNITS/HR: 100 INJECTION, SOLUTION SUBCUTANEOUS at 11:25

## 2017-03-28 RX ADMIN — HYDROCORTISONE SODIUM SUCCINATE 50 MG: 100 INJECTION, POWDER, FOR SOLUTION INTRAMUSCULAR; INTRAVENOUS at 14:32

## 2017-03-28 RX ADMIN — POTASSIUM CHLORIDE 20 MEQ: 29.8 INJECTION, SOLUTION INTRAVENOUS at 08:32

## 2017-03-28 RX ADMIN — FUROSEMIDE 40 MG: 10 INJECTION, SOLUTION INTRAVENOUS at 16:41

## 2017-03-28 RX ADMIN — VANCOMYCIN HYDROCHLORIDE 1250 MG: 10 INJECTION, POWDER, LYOPHILIZED, FOR SOLUTION INTRAVENOUS at 17:15

## 2017-03-28 RX ADMIN — HYDROCORTISONE SODIUM SUCCINATE 50 MG: 100 INJECTION, POWDER, FOR SOLUTION INTRAMUSCULAR; INTRAVENOUS at 20:35

## 2017-03-28 RX ADMIN — PANTOPRAZOLE SODIUM 40 MG: 40 INJECTION, POWDER, FOR SOLUTION INTRAVENOUS at 07:36

## 2017-03-28 RX ADMIN — ASPIRIN 81 MG: 81 TABLET, COATED ORAL at 07:37

## 2017-03-28 RX ADMIN — HUMAN INSULIN 6 UNITS/HR: 100 INJECTION, SOLUTION SUBCUTANEOUS at 03:12

## 2017-03-28 RX ADMIN — Medication 2 G: at 01:16

## 2017-03-28 RX ADMIN — HUMAN INSULIN 4 UNITS/HR: 100 INJECTION, SOLUTION SUBCUTANEOUS at 20:59

## 2017-03-28 RX ADMIN — PHYTONADIONE 2.5 MG: 10 INJECTION, EMULSION INTRAMUSCULAR; INTRAVENOUS; SUBCUTANEOUS at 11:42

## 2017-03-28 RX ADMIN — POTASSIUM CHLORIDE 40 MEQ: 1.5 POWDER, FOR SOLUTION ORAL at 07:36

## 2017-03-28 RX ADMIN — POTASSIUM CHLORIDE 20 MEQ: 29.8 INJECTION, SOLUTION INTRAVENOUS at 13:17

## 2017-03-28 NOTE — PROGRESS NOTES
Cardiology Progress Note    Events and interval changes in past 24 hours:   Bloody output from feeding tube  Diuresed overnight for high CVP 16 and low urine output, PA diastolic 18  Radial and brachial art line attempted but clogged  Epi started in cath lab but SVR low 200 CI 1.7 septic picture so vasopressin started. SVR better now 1300 CI 2.3 so epi restarted    Tm 38.3 HR 110s MAP 70-80s  IABP 1:1 MAP   CMV 12/450/5/40 ABG 7.33/103/37/20 lactate 4.6    CVP 12  PA 26/20/22  PCW16  SvO2 55% (8am) BSA 1.6 Hgb 11.6   CI 1.7  CO  SVR  PVR      Drips  Dopamine 9  Epi 0.02  Phenylephrine off    CXR: no pulm edema, [place], ETT, IABP, enteric tube in stomach  I/O0.7/0.4 since MN 0.6/1.7    OBJECTIVE FINDINGS:  /73  Temp 101  F (38.3  C) (Oral)  Resp 14  SpO2 99%    Gen: intubated  CV: RRR, S1 & S2, systolic murmur in 2nd left ICS, LLB and apex  Lungs: CTAB anteriorly  Abd: slightly distended, soft  Ext: DP pulses not palpable but dopplerable per RN, no peripheral edema      Meds  Asa 81  Plavix 75  Hydrocortisone 50mg q6hrs  Zosyn,vanco  PPI    Wt Readings from Last 5 Encounters:   03/27/17 62 kg (136 lb 11 oz)   07/15/08 68.5 kg (151 lb)         Labs and Imaging  Reviewed in epic  Flu swab negative    Echo 3/27  The visual ejection fraction is estimated at 30-35%.  There is extensive inferior, inferoseptal, and inferolateral wall akinesis.  Basal/mid lateral wall hypokinesis  There is moderate to severe septal hypokinesis.  Septal wall motion abnormality may reflect pacemaker activation.  There is a catheter/pacemaker lead seen in the right ventricle.  The right ventricle is mildly dilated.  Severely decreased right ventricular systolic function  There is no pericardial effusion.  The rhythm was paced.  Compared to the prior study, the LVEF appears somewhat decreased. Septal wall  motion abnormality larger- may reflect, in part, that patient in sinus tach on  prior study, and ventricular paced now. No  hemodynamically significant  valvular abnormalities on 2D or color flow imaging    CT chest/abdomen 3/27   IMPRESSION:   1. Small mediastinal hemorrhage, small bilateral pleural effusions  which may be hemorrhagic. Small intraperitoneal hemorrhage. Minimal  pericardial hemorrhage.  2. Minimal pneumoperitoneum in the left paracolic gutter, of unknown  significance. No pneumatosis as clinically questioned.  3. IABP slightly low in position.  4. Bilateral pulmonary dependent consolidations represent compression  atelectasis, however differentials include aspiration.    Cath 3/26 [facility]  SUMMARY:   --Inferior STEMI w/ late presentation. Culprit dictated by complete  heart block, and cardiogenic shock. Emergent echo in the Cath Lab also  shows significant RV dysfunction.  --Three vessel coronary artery disease with diffuse coronary disease  out to the distal vessels  --Successful POBA of the prox and mid-RCA. Stent was not placed, in  anticipation he may need to be considered for bypass surgery in the  near future  --Insertion of a temporary pacemaker for bradycardia (AVB) and  hypotension  --Insertion of a IABP  --Upper GI bleed consistent with [facility] 3/27        Plan for today  1) based on hemodynamics at 11am, start Dobutamine and wean Epi. Goal SVR 1000 CI 2 MAP >70, Keep IABP on for today  2) If CVP consistently >16, diuresis. Ordered lasix 40 IV once  3) CT head to r/o brain bleed since minimal response when versed weaned down to 1.  4) Likely start feeding tomorrow  5) f/u on cultures. Sputum cx growing staph aureus. Got peripheral blood cx for fever of 101    Card  # Mixed shock: Cardiogenic from biventricular failure from inferior STEMI and distributive shock from post-MI SIRS response vs aspiration pneumonia/pneumonitis from possible aspiration during cath at [facility] on 3/26  # Due to inferior wall STEMI. S/P 7 stents to LAD, circ, RCA (angiogram report pending).  Now with IABP, temporary pacemaker after 3rd deg heart block in cath lab at [facility] on 3/26   # Small pericardial hemorrhage  - Continue IABP for coronary perfusion  - Leave in pacemaker for now  - Dual antiplatelet therapy with ASA 81mg and plavix 75mg qday  - Will adjust plan according to hemodynamics and further test results.      # Neuro  -didn't wake up after sedation weaned to minimum- likely from sedation in setting of shock liver, r/o brain bleed  Sedation versed gtt + PRNs  Analgesia fentanyl gtt + PRNs  Daily sedation holidays once stabilized        Respiratory  #Intubated for airway protection due to mental status and emesis on 3/26.   #Probable Aspiration PNA/pneumonitis  #Small mediastinal hemorrhage  Zosyn,vanco for Day 2/5        # ID  . Sepsis from aspiration pneumonia/pneumonitis vs post-MI SIRS response  - UA/Uc, blood cx, sputum cx,  - Empirc vanc/zosyn, wean as able  - Hydrocortisone 50mg q6hr for BP support     # Endocrine  T2DM: HA1C 7.5 yesterday on admission. BG climibing to 170s since admission. Will likely continue to rise with steroids.   - Insulin gtt per nurising protocol with hypoglycemia precautions.      # GI  Minimal pneumoperitoneum, unclear etiology: Seen by General surgery.   -Conservative Mx for now  Shock liver injury with coagulopathy- trend LFTs, Vit K 2.5mg 3/28       # Renal/  Acute kidney injury-nonoliguric: multifactorial: hypoperfusion, contrast etc.  - Daily Cr  - Avoid nephrotoxins as able  - Chavis placed 3/26     FEN: Nutrition consult and NJ tube ordered  Code: FULL  PPx: hold of on chemical DVT PPx until bleeding risk decreases (no further hematemesis), PPI 40mg IV qday, senna PRN  Dispo: pending stability    Will staff with Dr. Talon Park  General Cardiology fellow, PGY4  Pager [phone]

## 2017-03-28 NOTE — PROCEDURES
ARTERIAL LINE (A-Line) PLACEMENT  Indication: Hemodynamic monitoring    A time-out was completed verifying correct patient, procedure, site, positioning, and special equipment if applicable. Malik s test was performed to ensure adequate perfusion. The patient s right wrist and arm were prepped and draped in sterile fashion. 1% Lidocaine was used to anesthetize the area. A 18G arrow arterial line was introduced into the radial artery initially, which was unsuccessful likely due to vasospasm. The line was then placed into the right brachial artery with good blood return and pulsatile waveform. The catheter was then sutured in place to the skin and a sterile dressing applied. Perfusion to the extremity distal to the point of catheter insertion was checked and found to be adequate. <Attending/Resident> was present for the entire procedure.  Estimated Blood Loss: <10 cc    The patient tolerated the procedure well and there were no complications.    Joanne Patel  Cardiology Fellow  [phone]

## 2017-03-28 NOTE — PLAN OF CARE
Problem: Goal Outcome Summary  Goal: Goal Outcome Summary  Outcome: Improving  Patient arrived from cath lab approx 1850. Transfer from [facility]. Family stopped by quickly. Unsure if patient is english speaking. Spoke with daughter right away during shift. Reassess possible language barrier today with family. Moves all extremities x 4. Did not follow commands. Versed 8 Fent 50. PERRL 1mm. -120s with occasional PVCs. Magnesium replaced. Pulses dopplerable bilat to lower extremities. MAP goal >70. Epi restarted 0.03. Donte shut off. Dopamine 9mcg to time. IABP 1:1 100% augmentation. See flow sheets. Lungs clear and diminished. CMV TV-450 RR-12 PEEP 8 Fi02-40%.  4 amps of bicarb given over NOC. Lactic trending down. Sputum sent. Influenza swab negative. Reported pt vomited blood during Cath lab procedure at [facility]. Bright blood from OG. Placement verified. Clamped to time. Abd distension noted. BS active.  R & L groin lines labeled appropriately from outside hospital. Bumex & Lasix given as ordered. >100 UOP post diuretic administration.

## 2017-03-28 NOTE — SIGNIFICANT EVENT
Crosscover note:    CT 10:34 pm     Small mediastinal hemorrhage, sm bilateral pleural effusion, small intra peritoneal hemorrhage, minimal pericardial hemorrhage, minimal pneumoperitoneaum of unknown significant, no pneumatosis. ETT low. Bibasilar dependent consolidations - aspiration vs atalectasis.  Case discussed with general surgery, no surgical interventions recommended.   Lactic acid 17    3:30 update.     Continues to have low grade fevers and tachycardia to 112. MAPs consistently > 70.   Pt weaned off of eip and onto phenylephrine around 10pm to minimize vasodilation.  Dopamine at 9. Phenlyl at 0.5    Lactic acid trending down.     CVP 24. CO 4.4 SVR 1575  4mg IV bumex x 1  40mg IV lasix x 1    PH improving from 7.08 to 7.33  PaO2 normalized from 51 to 103    4:22   Alerted by nursing that OG tube is having bright red bloody output.   Stopped suction and clamped.     Lactic acid 4.6

## 2017-03-28 NOTE — CONSULTS
CARDIOTHORACIC SURGERY CONSULTATION      REFERRING CARDIOLOGIST:  Shea Mcginnis MD      REASON FOR CONSULTATION:  Evaluation for potential coronary artery bypass surgery.      HISTORY OF PRESENT ILLNESS:  Mr. Luke Henao is a 51-year-old gentleman with diabetes who was brought to the hospital with a delayed inferior STEMI yesterday.  He was taken to the Cath Lab emergently, and was found to have a severely diffusely diseased LAD system with occluded left circumflex with no visible collaterals.  His RCA was the culprit lesion with 100% thrombotic occlusion, and underwent a thrombectomy with percutaneous balloon angioplasty to the proximal and the mid RCA.  No stents were deployed with anticipation of future coronary artery bypass grafting.  The patient has been in the ICU on a dopamine drip with an intraaortic balloon pump, and I was asked to evaluate the patient for potential coronary artery bypass surgery.      PAST MEDICAL HISTORY/SURGICAL HISTORY:  Unknown.      FAMILY HISTORY:  Noncontributory.      SOCIAL HISTORY:  He is .  He is a current half pack a day smoker.      REVIEW OF SYSTEMS:  Unable to obtain.      PHYSICAL EXAMINATION:   GENERAL:  The patient is on a dopamine drip with an intra-aortic balloon pump with stable hemodynamics at this time.  He is intubated and sedated.   CARDIOVASCULAR:  Regular rate and rhythm.  There is a holosystolic murmur and a rub present.   LUNGS:  Clear bilaterally.   ABDOMEN:  Nondistended.   EXTREMITIES:  Positive for 1+ lower extremity edema.   NEUROLOGICAL:  Assessment is deferred.      IMPRESSION AND PLAN:  This is an unfortunate 51-year-old gentleman who presented with a delayed anterior STEMI, who had a 100% thrombotic occlusion of the RCA that was successfully opened up with thrombectomy and balloon angioplasty.  He also has severe diffuse LAD disease and an occluded left circumflex coronary artery.  Given his clinical status, he is a poor surgical candidate,  and his surgical risk for coronary artery bypass grafting would be extremely high and, therefore, I do not recommend proceeding with CABG at this time.  I discussed this with Dr. Mcginnis, and plans will be made for repeat PCI in a Cath Lab today.         STEPHEN BARAJAS MD             D: 2017 23:07   T: 2017 01:49   MT: KEN#[id number]      Name:     SYDNIE SIERRA   MRN:      [id number]        Account:       [id number]   :      1965           Consult Date:  2017      Document: [id number]       cc: Shea Mcginnis MD

## 2017-03-28 NOTE — PROGRESS NOTES
CLINICAL NUTRITION SERVICES - ASSESSMENT NOTE     Nutrition Prescription    RECOMMENDATIONS FOR MDs/PROVIDERS TO ORDER:  1. Once approp to place FT/begin TF, send consult for Cortrak FT placement v XR FT placement (if requires placement under fluoro).     2. If unable to place FT and begin TF in the next ~2 days and suspect prolonged NPO status, consider sending consult for Pharmacy/Nutrition to start & manage TPN.    Malnutrition Status:    Unable to determine (see below).    Recommendations already ordered by Registered Dietitian (RD):  None today.    Future/Additional Recommendations:  1. Once approp to begin TF (pending GI status), rec Peptamen 1.5 (semi-elemental, fiber free TF formula) @ 10 ml/hr with adv by 10 ml q 8 hrs (or per MD/provider discretion) as tolerated and if K+/Mg++ remain WNL and Phos > 1.9 until goal 50 ml/hr (1200 ml/day) to provide 1800 kcal (29 kcal/kg), 82 g PRO (1.3 g/kg), 924 ml free H2O, 67 g Fat (70% from MCTs), 226 g CHO and no Fiber daily per dosing weight of 62 kg.     2. Once begin TF, flush FT with at least 30 ml water q 4 hrs for patency. Further adjustments per MD/provider discretion pending Na trend/fluid status.    3. Once begin TF, order multivitamin/mineral (Certavite, 15 ml/day via FT) to help ensure micronutrient needs being met with suspected hypermetabolic demands and potential interruptions to TF infusions.    4. If requires PN/lipid support, rec 60 ml/hr (1440 ml/day) or more concentrated as able per PharmD of 140 g dextrose (GIR = 1.6 mg/kg/min), 90 g amino acids and 250 ml 20% IV lipids 5x weekly (M-F) per dosing weight of 62 kg. If K+/Mg++ remain WNL and Phos > 1.9, adv PN dextrose by 35 g/day until goal 245 g dextrose. Goal PN/lipids to provide 1550 kcal (25 kcal/kg), 90 g PRO (1.5 g/kg), 245 g CHO (GIR = 2.7 mg/kg/min) with 23% kcal from lipids daily per dosing weight of 62 kg.    5. If requires PN/lipid support, monitor TBili, Alk Phos, ALT/AST and TG levels at  "least weekly with provisions received.     6. With either form of nutrition support, ensure K+/Mg++/Phos ordered daily until provisions adv to goal infusion to evaluate for sx of refeeding with nutrition received, need for lyte replacement per already-ordered lyte protocols and approp to continue to adv nutritional provisions.     REASON FOR ASSESSMENT  Luke Henao is a/an 51 year old male assessed by the dietitian for Provider Order - Registered Dietitian to Assess and Order TF per Medical Nutrition Therapy Protocol    NUTRITION HISTORY  Unable to obtain nutrition history as patient intubated. History of diabetes per chart review.    CURRENT NUTRITION ORDERS  Diet: NPO since 3/27    LABS  Labs reviewed  -Na elevated @ 153  -K+ low @ 3.1 (up to 5.6 on 3/26), Mg++ elevated @ 2.6 and Phos elevated @ 5.3  -Lactic acid elevated @ 4.7, trending down  -Hemoglobin A1C elevaetd @ 7.5 on 3/27    MEDICATIONS  Medications reviewed  -PRN K+/Mg++/Phos IV replacement protocols  -Dopa, epi with mean BPs > 60  -Insulin drip  -Bowel regimen    ANTHROPOMETRICS  Height: 157.5 cm (5' 2.008\")  Most Recent Weight: 63.1 kg (139 lb 1.8 oz)    IBW: 53.6 kg  BMI: Overweight BMI 25-29.9  Weight History: Unable to obtain details as patient intubated  Wt Readings from Last 20 Encounters:   03/28/17 63.1 kg (139 lb 1.8 oz)   03/27/17 62 kg (136 lb 11 oz)   07/15/08 68.5 kg (151 lb)     Dosing Weight: 62 kg (actual, lowest weight this adm on 3/27)    ASSESSED NUTRITION NEEDS  Estimated Energy Needs: 2277-3058 kcals/day (25 - 30 kcals/kg)  Justification: Maintenance  Estimated Protein Needs: 74-93 grams protein/day (1.2 - 1.5 grams of pro/kg)  Justification: Hypercatabolism with critical illness  Estimated Fluid Needs: 1 mL/Kcal or per provider pending fluid status    PHYSICAL FINDINGS  See malnutrition section below.  -IABP   -OG tube, difficult placement per discussion with RN, awaiting repeat AXR  -Abdominal CT 3/27: Minimal pneumoperitoneum in " the left paracolic gutter, of unknown significance. No pneumatosis as clinically questioned.  -John: 16, Nutrition John: 3    MALNUTRITION  % Intake: Unable to assess  % Weight Loss: Unable to assess  Subcutaneous Fat Loss: None observed  Muscle Loss: None observed  Fluid Accumulation/Edema: None noted  Malnutrition Diagnosis: Unable to determine due to inability to obtain detailed nutrition/weight history     NUTRITION DIAGNOSIS  Inadequate protein-energy intake related to intubated, unable to take PO as evidenced by NPO status since 3/27 with no other means of nutrition yet started and unclear timeline for starting TF given GI status.      INTERVENTIONS  Implementation  Nutrition Education- Not appropriate at this time due to patient condition.   Collaboration with other providers- Per discussion with Cards 2 MD, plan to hold off on placing FT/TF at this time, will re-evaluate once approp (pending GI status).    Goals  1. Nutrition support within 2-3 days.  2. Total avg nutritional intake to meet a minimum of 25 kcal/kg and 1.2 g PRO/kg daily (per dosing wt 62 kg).     Monitoring/Evaluation  Progress toward goals will be monitored and evaluated per protocol.      Mariangel Salmon RD, LD (pager [phone])  RD will continue to follow

## 2017-03-28 NOTE — CONSULTS
Social Work: Assessment with Discharge Plan    Patient Name:  Luke Henao  :  1965  Age:  51 year old  MRN:  [id number]  Risk/Complexity Score:  Filed Complexity Screen Score: 6  Completed assessment with:  Patient's dtr Camtu, Nursing, team rounds, chart review    Presenting Information   Reason for Referral:  questions re: decision-maker  Date of Intake:  2017  Referral Source:  Nurse  Decision Maker:  Pt's wife Job  Alternate Decision Maker:  Pt's four children Rozina Riccardojaylon ([phone]), Vikki, and Mony  Health Care Directive:  Not on file  Living Situation:  House with wife Job and Job's child from a previous relationship.  Previous Functional Status:  Independent  Patient and family understanding of hospitalization:  Restorative, but appear to understand the seriousness of pt's condition  Cultural/Language/Spiritual Considerations:  Pt is Yarsanism and his primary language is [place]ijani.  Pt does speak some English.  Camtu believes pt's wife speaks some English.  Pt's four children are fluent in English.  Adjustment to Illness:  Unable to assess pt d/t vent and sedation; pt's dtr Camtu appears to be adjusting appropriately and is asking good questions.    Physical Health  Reason for Admission:  No diagnosis found.  Services Needed/Recommended:  Other:  TBD    Mental Health/Chemical Dependency  Diagnosis:  N/a  Support/Services in Place:  N/a  Services Needed/Recommended:  N/a    Support System  Significant relationship at present time:  Wife Job with whom pt lives along with Job's child from a previous marriage.  Family of origin is available for support:  Somewhat:  Pt has four children but none live locally.  Noah lives in [place] and RozinaVikki, and Mony live in [place] along with pt's extended family and long-time friends.  Noah is here in hospital and plans to stay at the noodls [facility] nearby, and pt's other three children plan to fly in tonight.  Other support available:  Noah does not  know about other local supports d/t living out of state.  Will plan to f/u with pt's wife when she arrives.  Gaps in support system:  Unclear--will need to f/u with pt's wife.  Patient is caregiver to:  Child:  Pt's wife has a child from a previous relationship who lives with pt and his wife.    Provider Information   Primary Care Physician:  No Ref-Primary, Physician   None   Clinic:        :  none    Financial   Income Source:  Both pt and his wife work at [employer].  Financial Concerns:  None.  Riccardou believes that pt organized his life insurance and last oakley a couple of years ago.  Insurance:    Payor/Plan Subscriber Name Rel Member # Group #   PREFERREDONE - PEAK CINDY* SYDNIE SIERRA  [id number]       [id number]       Discharge Plan   Patient and family discharge goal:  TBD  Barriers to discharge:  Medical stability    Discharge Recommendations   Anticipated Disposition:  TBD pending medical course.    Additional comments   Met with pt's dtr Teratu to introduce SW role, address concerns, and offer support.  After discussion, confirmed per [facility] public records that pt and his wife are legally .  Teratu does not have concerns re: pt's wife, but just states she and her siblings do not know [facility] well despite she and pt being  for 10 years since all of pt's children live out-of-state.  Camtu states she believes pt and his wife have a good and supportive relationship.  Offered support.  SW will continue to follow for support and needs as they arise.    LUIS Villafana, [facility]  Adult ICU Clinical   Pager [phone]

## 2017-03-28 NOTE — H&P
"[facility], [place]    History and Physical  Cardiology     Date of Admission:  3/27/2017    Assessment & Plan   Luke Henao is a 51 year old male smoker with diabetes who was transferred from [facility] in cardiogenic shock after sustaining an inferior wall STEMI.     # Cardiogenic shock  Due to inferior wall STEMI. S/P 7 stents to LAD, circ, RCA (angiogram report pending). Now with IABP, temporary pacemaker after 3rd deg heart block in cath lab, requiring epi, dopa, and phenylephrine. SVR very low at 200 which is more consistent with septic shock. Also hyperdynamic on bedside ECHO.  - Continue IABP for coronary perfusion  - Leave in pacemaker for now  - Dual antiplatelet therapy with ASA 81mg and plavix 75mg qday (loaded in cath lab)  - Will adjust plan according to hemodynamics and further test results.     # Neuro  Sedation versed gtt + PRNs  Analgesia fentanyl gtt + PRNs  Daily sedation holidays once stabilized  Became altered at [facility] prior to intubation.     # Respiratory  Intubated for airway protection due to mental status and emesis. ABG 7/1/46/58/14  CMV/AC 12/550 (up from 450)/5/40  Will get chest CT to eval for hemothorax given low oxygen on ABG    # ID  Febrile to 101.7 at [facility]. No classic culprits on med list. Lactic acid up to 17 and  concerning for sepsis. His h/o \"flu like illness\" may well be an infection concurrnt to his MI rather than anginal symptoms.   - UA/Uc, blood cx, sputum cx, flu swab  - Empirc vanc/zosyn, wean as able  - Hydrocortisone 50mg q6hr for BP support    # Endocrine  T2DM: HA1C 7.5 yesterday on admission. BG climibing to 170s since admission. Will likely continue to rise with steroids.   - Insulin gtt per nurising protocol with hypoglycemia precautions.     # GI  Firm abdomen most likely from congestion but given rise in lactic acid will need to rule out ischemic bowel. Most concerning is possiblity of vasodilation shunting blood away " from the organs and to the periphery.   - Trending lactic acid q4hr  - CT abdomen, noncontrast due to contrast load today    # Renal/  Chavis cath in. Cr trending down from admission though will expect a rise after the contrast load from today.   - Daily Cr  - Avoid nephrotoxins as able  - Chavis placed 3/26    FEN: Nutrition consult and NJ tube ordered  Code: FULL  PPx: hold of on chemical DVT PPx until bleeding risk decreases (no further hematemesis), PPI 40mg IV qday, senna PRN  Dispo: pending stability      Simona Gonzalez MD  Internal Medicine PGY2  Pager [phone]    Code Status   Full Code    Primary Care Physician   Physician No Ref-Primary    Chief Complaint   Back pain, flu-like symptoms, weakness    History obtained from chart review.    History of Present Illness   Luke Henao is a 51 year old male with diabetes who presented yesterday evening to [facility] via EMS for two days of flu-like illness with vomiting, back and shoulder pain, and new weakness.  EMS identified an inferior STEMI en route. BP intitially 88/57mmHg and pt noted to be bradycardic. At the [facility] ED, he was given 2L NS, 180mg brilinta, full dose ASA and was in the cath lab within 30 min. Angiogram at [facility] revealed severely disease LAD with possibe  of LCX. RCA with 100% thrombolic occlusion. Aspiration thrombectomy performed and POBA in prox and mid RCA reinstated TIMI3 flow. No stent deployed at that time due to need for CABG. Procedure complicated by complete heart block with HR 30 requiring temporary pacemaker placement set at 80bpm. Pt vomited multiple time with one episode of small hematemesis. An IABP was placed in the cath lab. During the procedure he became altered and was intubated for airway protection and to allow for vascular access. He was evaluated by the surgical team at [facility] who deamed him a poor candidate for CAB given poor targets and overall clinical picture.     Pt was transferred to [facility]  for further evaluation for mechanical support. He went to the cath lab where 7 stents were placed (3 to RCA, 2 to circ, 1 to LAD). He was loaded with plavix and continued on dopamine (started at [facility]) and also started on epinephrine. He arrived to the floor well sedated, intubated but with MAP 58.     Past Medical History    No past medical history on file.   H/O diabetes per [facility] notes    Past Surgical History   No past surgical history on file.    Prior to Admission Medications   Prior to Admission Medications   Prescriptions Last Dose Informant Patient Reported? Taking?   ELOCON 0.1 % EX CREA   No No   Sig: apply to bug bites qd-bid prn   OCUFLOX 0.3 % OP SOLN   No No   Si-2 gtts in eye q 4-6 hrs       Facility-Administered Medications: None     Allergies   No Known Allergies    Social History    Per chart review he has been smoking Cigarettes.  He has been smoking about 0.50 packs per day. He does not have any smokeless tobacco history on file.    Family History   No family history on file.    Review of Systems   Unable to assess 2/2 sedation    Physical Exam   Temp: 99.3  F (37.4  C) Temp src: Oral BP: 96/65   Heart Rate: 110   SpO2: 100 %      Vital Signs with Ranges  Temp:  [96.4  F (35.8  C)-101.7  F (38.7  C)] 99.3  F (37.4  C)  Heart Rate:  [] 110  Resp:  [13-18] 16  BP: ()/() 96/65  FiO2 (%):  [40 %-50 %] 40 %  SpO2:  [98 %-100 %] 100 %  0 lbs 0 oz    Gen: sedated, intubated, NAD  CVS: balloon pump murmur audible throughout precordium  - All four extremities cool to touch distally, non cyanotic, cap refill ~3sec  - Estimated JVD 17  - No pedal edema  Resp: coarse breath sound in bilateral anterior fields, breathing with the vent  Abd: soft, slightly distended, NABS, IABP audible  Chavis in place with clear yellow urine, femoral central venous line in place. No signs of infection or hematoma in the groin sites    Data   Cr 1.63 (down from 2.4 on admission)  Anion gap  18  Lactic acid 10.6 increased to 15.0  Glucose well controlled ~ 150    ABG 7.08/47/51/14 on 40% FiO2  Venous oxyhgb 56    WBC 17.7 up from 13.4 on admission  Hgb 12.0 MCV 95  INR 2.25       At [facility]  Trop >200  ALT 2339 AST 4486, alk phos nml 55  A1C 7.5  Albumin 2.9 tprotein 6.1  TG 76, LDL 77, HDL 36    CXR  Pending

## 2017-03-28 NOTE — CONSULTS
General Surgery Consult Note  Staff: Dr. Potter  Date: 3/28/2017   Consulted for: Pneumoperitoneum by Cardiology    Assessment/Plan:  51 year old male with diabetes who presented with STEMI s/p cardiac cath and stent placement and IABP with concern for pneumoperitoneum on CT scan and lactate acidosis. Cannot explain pneumoperitoneum, but lactate is improving and likely cardiac in origin. Treatment for pneumoperitoneum is exploratory laparotomy, which would not ultimately improve his overall clinical status at this time. No indication for operative intervention at this time.  - Continue to trend lactate  - Serial abdominal exams  - Surgery to follow    Discussed with Anthony Harris and Tariq Ni MD  General Surgery PGY-2    ------------------------------------------  HPI: History gathered from chart as patient is unresponsive    Luke Henao is a 51 year old Syriac speaking male with history of diabetes who presented to OSH with vomiting and flu-lik symptoms, found to have STEMI. He was taken to the cath lab and where evaluation showed 3-vessel coronary artery disease, for which the right coronary artery was thought to be the culprit vessel, and stenting was performed. He required an intraaortic balloon pulsation device to be placed and a temporary venous pacemaker. During the procedure, he was agitated and there was hematemesis . He was intubated and transferred to the ICU for further management. Upon arrival patient underwent CT chest/abdomen/pelvis which demonstrated a small amount of pneumoperitoneum and labs demonstrated lactate of 17 for which surgery was consulted.     PMH:  Diabetes     PSHx:  Unable to obtain    Medications:  ELOCON 0.1 % EX CREA apply to bug bites qd-bid prn   OCUFLOX 0.3 % OP SOLN 1-2 gtts in eye q 4-6 hrs        Allergies:   NKDA     SocHx:  Social History     Marital status:      Spouse name: N/A     Number of children: N/A     Years of education: N/A      Social History Main Topics     Smoking status: Current Every Day Smoker     Packs/day: 0.50     Types: Cigarettes     Smokeless tobacco: Not on file     Alcohol use Not on file     Drug use: Not on file     Sexual activity: Not on file     FamHx:  Unable to obtain    Review of Systems:  ROS: 10 point ROS neg other than the symptoms noted above in the HPI.    Physical Examination   /67  Temp 100.3  F (37.9  C) (Oral)  Resp 14  SpO2 100%  General: Intubated and sedated  Pulm: Ventilated  CV: RRR on two pressor medications  Abdomen: soft, distended, no peritoneal signs  Musculoskel/Extremities: no edema, erythema, tenderness   Skin: no rashes, no diaphoresis and skin color normal     Labs: Reviewed  Lactate 17->14->10->7    WBC 19    AST 3800  ALT 3200    Cr 1.74    Imaging: Reviewed  CT Abd/Pelvis - radiology resident prelim read  1. Small mediastinal hemorrhage, small bilateral pleural effusions which may be hemorrhagic. Small intraperitoneal hemorrhage. Minimal pericardial hemorrhage.  2. Minimal pneumoperitoneum in the left paracolic gutter, of unknown significance. No pneumatosis as clinically questioned.  3. IABP slightly low in position.  4. Bilateral pulmonary dependent consolidations represent compression atelectasis, however differentials include aspiration.  5. Endotracheal tube tip approaching the suzanne.

## 2017-03-28 NOTE — PROGRESS NOTES
"Surgery Progress Note    No acute events overnight. Diuresed, continues to require pressors.    /71  Temp 99.5  F (37.5  C) (Oral)  Resp 12  Ht 1.575 m (5' 2.01\")  Wt 63.1 kg (139 lb 1.8 oz)  SpO2 100%  BMI 25.44 kg/m2  Intubated, sedated  Abdomen soft, non-distended, no rigidity    Lactate 3.7  WBC 16.3  AST/ALT 6528/4381    51 M with diabetes who presented with STEMI s/p cardiac cath and stent placement and IABP with concern for pneumoperitoneum on CT scan and lactic acidosis. White count and lactate improving. Remains critically ill.  -- No indication for surgery, no bowel ischemia noted on CT, unclear cause of pneumoperitoneum  -- Interval abdominal exams are stable to improved  -- Surgery team will sign off at this time    Adama Land MD  PGY-2, General Surgery  [phone]    "

## 2017-03-28 NOTE — PLAN OF CARE
Problem: Goal Outcome Summary  Goal: Goal Outcome Summary  OT 4E: Hold - OT orders received and acknowledged. Per discussion with RN and chart review, pt intubated/sedated with B groin lines and not hemodynamically stable. Will reschedule and initiate as appropriate.

## 2017-03-28 NOTE — PHARMACY-VANCOMYCIN DOSING SERVICE
Pharmacy Vancomycin Initial Note  Date of Service 2017  Patient's  1965  51 year old, male    Indication: Sepsis    Current estimated CrCl = 46 mL/min    Creatinine for last 3 days  3/26/2017:  6:00 PM Creatinine 2.33 mg/dL; 10:53 PM Creatinine 1.60 mg/dL  3/27/2017:  5:51 AM Creatinine 1.42 mg/dL;  7:12 PM Creatinine 1.63 mg/dL;  8:55 PM Creatinine 1.68 mg/dL    Recent Vancomycin Level(s) for last 3 days  No results found for requested labs within last 72 hours.      Vancomycin IV Administrations (past 72 hours)      No vancomycin orders with administrations in past 72 hours.                Nephrotoxins and other renal medications (Future)    Start     Dose/Rate Route Frequency Ordered Stop    17  vancomycin (VANCOCIN) 1,250 mg in NaCl 0.9 % 250 mL intermittent infusion      1,250 mg Intravenous EVERY 24 HOURS 17  vancomycin (VANCOCIN) 1,500 mg in NaCl 0.9 % 250 mL intermittent infusion      1,500 mg Intravenous ONCE 17  piperacillin-tazobactam (ZOSYN) 4.5 g vial to attach to  mL bag      4.5 g  over 1 Hours Intravenous EVERY 6 HOURS 17  phenylephrine (RODRIGO-SYNEPHRINE) 50 mg in NaCl 0.9 % 250 mL infusion      0.5-6 mcg/kg/min × 62 kg (Dosing Weight)  9.3-111.6 mL/hr  Intravenous CONTINUOUS 17  EPINEPHrine (ADRENALIN) 5 mg in NaCl 0.9 % 250 mL infusion      0.01-0.3 mcg/kg/min × 62 kg (Dosing Weight)  1.9-55.8 mL/hr  Intravenous CONTINUOUS 17            Contrast Orders - past 72 hours (72h ago through future)    Start     Dose/Rate Route Frequency Ordered Stop    17 183  iopamidol (ISOVUE-370) solution 200 mL      200 mL INTRA-ARTERIAL ONCE 17 [id number]                Plan:  1.  Start vancomycin 1500 mg IV once, followed by 1250 mg IV q24h.   2.  Goal Trough Level: 15-20 mg/L   3.  Pharmacy will check trough levels as appropriate in  1-3 Days.    4. Serum creatinine levels will be ordered daily for the first week of therapy and at least twice weekly for subsequent weeks.    5. [place] method utilized to dose vancomycin therapy: Method 2    Soraya Pate, Pharm.D.      Pharmacy Empiric Dose Change Per Policy  Original Dose Ordered: 1250mg q24h  Dose Changed To: 1250mg q18h given age and borderline renal function and septic picture.    George McclureD, BCCCP

## 2017-03-28 NOTE — DISCHARGE SUMMARY
Physician Discharge Summary     Patient ID:  Luke Henao  [id number]  51 year old  1965    Admit date: 3/26/2017    Discharge date and time: 3/27/2017  2:30 PM     Admitting Physician: Black Pride MD     Discharge Physician: Sage Villarreal    Admission Diagnoses: Cardiogenic shock (H) [R57.0]  Acute kidney injury (H) [N17.9]  ST elevation myocardial infarction (STEMI), unspecified artery (H) [I21.3]    Discharge Diagnoses: same    Admission Condition: critical    Discharged Condition: critical    Indication for Admission: see admision dx    Hospital Course: Mr. Henao was admitted for an inferior STEMI.  He underwent PCI and had a balloon pump placed in the cath lab for cardiogenic shock.  He was also started on dopamine.  He was intubated for agitation and hematemesis (believed due to cristina gallegos tear) during the procedure.  He was then transferred to the ICU.  On post-cath day one he was febrile, so was pan-cultured and started on empiric antibiotics.  He did not have further hematemesis and his hgb remained stable.  He was weaned somewhat off of dopamine with volume resuscitation, but continued to require balloon pump support. He was evaluated by CT surgery and determined not to be a candidate for CAB, so was transferred to [facility] for further PCI.    Consults: cardiology    Significant Diagnostic Studies: see EMR    Treatments: see above    Discharge Exam:  GEN: no acute distress   HEENT: head ncat, sclera anicteric, OP patent, trachea midline   PULM: unlabored synchronous with vent, clear anteriorly  CV/COR: RRR S1S2 no gallop, No rub, no murmur  ABD: soft nontender, hypoactive bowel sounds, no mass  EXT: Mild Edema, distal pulses intact, groin sites c/d/i. warm  NEURO: sedated  SKIN: no obvious rash  LINES: clean, dry intact    Disposition: transferred to another acute care hospital    Patient Instructions:   Discharge Medication List as of 3/27/2017  3:32 PM      CONTINUE these medications which have NOT  CHANGED    Details   ELOCON 0.1 % EX CREA apply to bug bites qd-bid prnDisp-30g, R-0Fax      OCUFLOX 0.3 % OP SOLN 1-2 gtts in eye q 4-6 hrs , Disp-1, R-0, Fax           Activity: activity as tolerated  Diet: npo here    Signed:  Phil Cazares  3/28/2017  9:44 AM

## 2017-03-28 NOTE — PLAN OF CARE
Problem: Goal Outcome Summary  Goal: Goal Outcome Summary  Outcome: No Change  D/I: Hemodynamics stable on current pressors (Epi and Dopamine gtts): CVP 12-14; PAP 26/20; CI 1.8-2.1; SVO2 54-61. IABP 1:1 w/ good augmentation. Means 60s-80s. B/P labile. Versed sedation weaned off @ 16:00. Good cough reflex and DEL RIO, but no purposeful response to verbal commands. PERRL. T 101. MD notified. Bld culture ordered. Good urine response to Lasix 40 mg IV x1. OGT replaced . No further blood from OGT. Daughter here and updated.  P: Continue to monitor hemodynamics . Head CT this pm. Eval changes in LOC.

## 2017-03-29 ENCOUNTER — APPOINTMENT (OUTPATIENT)
Dept: GENERAL RADIOLOGY | Facility: CLINIC | Age: 52
DRG: 228 | End: 2017-03-29
Attending: INTERNAL MEDICINE
Payer: COMMERCIAL

## 2017-03-29 ENCOUNTER — APPOINTMENT (OUTPATIENT)
Dept: CARDIOLOGY | Facility: CLINIC | Age: 52
DRG: 228 | End: 2017-03-29
Attending: INTERNAL MEDICINE
Payer: COMMERCIAL

## 2017-03-29 LAB
ALBUMIN SERPL-MCNC: 2.6 G/DL (ref 3.4–5)
ALBUMIN UR-MCNC: 10 MG/DL
ALP SERPL-CCNC: 56 U/L (ref 40–150)
ALT SERPL W P-5'-P-CCNC: 4152 U/L (ref 0–70)
ANION GAP SERPL CALCULATED.3IONS-SCNC: 11 MMOL/L (ref 3–14)
ANION GAP SERPL CALCULATED.3IONS-SCNC: 12 MMOL/L (ref 3–14)
ANION GAP SERPL CALCULATED.3IONS-SCNC: 7 MMOL/L (ref 3–14)
APPEARANCE UR: ABNORMAL
APTT PPP: 35 SEC (ref 22–37)
AST SERPL W P-5'-P-CCNC: 4442 U/L (ref 0–45)
BASE EXCESS BLDV CALC-SCNC: 2.4 MMOL/L
BASE EXCESS BLDV CALC-SCNC: 5.2 MMOL/L
BASE EXCESS BLDV CALC-SCNC: 5.7 MMOL/L
BASE EXCESS BLDV CALC-SCNC: 5.7 MMOL/L
BASE EXCESS BLDV CALC-SCNC: 5.9 MMOL/L
BASE EXCESS BLDV CALC-SCNC: 6.1 MMOL/L
BILIRUB DIRECT SERPL-MCNC: 0.5 MG/DL (ref 0–0.2)
BILIRUB SERPL-MCNC: 1.3 MG/DL (ref 0.2–1.3)
BILIRUB UR QL STRIP: NEGATIVE
BUN SERPL-MCNC: 35 MG/DL (ref 7–30)
BUN SERPL-MCNC: 44 MG/DL (ref 7–30)
BUN SERPL-MCNC: 44 MG/DL (ref 7–30)
CA-I BLD-MCNC: 4.2 MG/DL (ref 4.4–5.2)
CA-I BLD-MCNC: 4.7 MG/DL (ref 4.4–5.2)
CALCIUM SERPL-MCNC: 7.4 MG/DL (ref 8.5–10.1)
CALCIUM SERPL-MCNC: 7.6 MG/DL (ref 8.5–10.1)
CALCIUM SERPL-MCNC: 7.7 MG/DL (ref 8.5–10.1)
CHLORIDE SERPL-SCNC: 112 MMOL/L (ref 94–109)
CHLORIDE SERPL-SCNC: 114 MMOL/L (ref 94–109)
CHLORIDE SERPL-SCNC: 115 MMOL/L (ref 94–109)
CO2 SERPL-SCNC: 26 MMOL/L (ref 20–32)
CO2 SERPL-SCNC: 28 MMOL/L (ref 20–32)
CO2 SERPL-SCNC: 28 MMOL/L (ref 20–32)
COLOR UR AUTO: YELLOW
CREAT SERPL-MCNC: 2.04 MG/DL (ref 0.66–1.25)
CREAT SERPL-MCNC: 2.28 MG/DL (ref 0.66–1.25)
CREAT SERPL-MCNC: 2.66 MG/DL (ref 0.66–1.25)
ERYTHROCYTE [DISTWIDTH] IN BLOOD BY AUTOMATED COUNT: 13.7 % (ref 10–15)
ERYTHROCYTE [DISTWIDTH] IN BLOOD BY AUTOMATED COUNT: 13.8 % (ref 10–15)
ERYTHROCYTE [DISTWIDTH] IN BLOOD BY AUTOMATED COUNT: 14.3 % (ref 10–15)
GFR SERPL CREATININE-BSD FRML MDRD: 26 ML/MIN/1.7M2
GFR SERPL CREATININE-BSD FRML MDRD: 31 ML/MIN/1.7M2
GFR SERPL CREATININE-BSD FRML MDRD: 35 ML/MIN/1.7M2
GLUCOSE BLDC GLUCOMTR-MCNC: 102 MG/DL (ref 70–99)
GLUCOSE BLDC GLUCOMTR-MCNC: 106 MG/DL (ref 70–99)
GLUCOSE BLDC GLUCOMTR-MCNC: 108 MG/DL (ref 70–99)
GLUCOSE BLDC GLUCOMTR-MCNC: 128 MG/DL (ref 70–99)
GLUCOSE BLDC GLUCOMTR-MCNC: 129 MG/DL (ref 70–99)
GLUCOSE BLDC GLUCOMTR-MCNC: 144 MG/DL (ref 70–99)
GLUCOSE BLDC GLUCOMTR-MCNC: 148 MG/DL (ref 70–99)
GLUCOSE BLDC GLUCOMTR-MCNC: 149 MG/DL (ref 70–99)
GLUCOSE BLDC GLUCOMTR-MCNC: 149 MG/DL (ref 70–99)
GLUCOSE BLDC GLUCOMTR-MCNC: 153 MG/DL (ref 70–99)
GLUCOSE BLDC GLUCOMTR-MCNC: 165 MG/DL (ref 70–99)
GLUCOSE BLDC GLUCOMTR-MCNC: 174 MG/DL (ref 70–99)
GLUCOSE BLDC GLUCOMTR-MCNC: 177 MG/DL (ref 70–99)
GLUCOSE BLDC GLUCOMTR-MCNC: 183 MG/DL (ref 70–99)
GLUCOSE BLDC GLUCOMTR-MCNC: 59 MG/DL (ref 70–99)
GLUCOSE SERPL-MCNC: 104 MG/DL (ref 70–99)
GLUCOSE SERPL-MCNC: 160 MG/DL (ref 70–99)
GLUCOSE SERPL-MCNC: 184 MG/DL (ref 70–99)
GLUCOSE UR STRIP-MCNC: NEGATIVE MG/DL
GRAM STN SPEC: ABNORMAL
HCO3 BLDV-SCNC: 27 MMOL/L (ref 21–28)
HCO3 BLDV-SCNC: 29 MMOL/L (ref 21–28)
HCO3 BLDV-SCNC: 29 MMOL/L (ref 21–28)
HCO3 BLDV-SCNC: 30 MMOL/L (ref 21–28)
HCT VFR BLD AUTO: 30.3 % (ref 40–53)
HCT VFR BLD AUTO: 31.3 % (ref 40–53)
HCT VFR BLD AUTO: 31.5 % (ref 40–53)
HCT VFR BLD AUTO: 32.1 % (ref 40–53)
HGB BLD-MCNC: 10.1 G/DL (ref 13.3–17.7)
HGB BLD-MCNC: 10.4 G/DL (ref 13.3–17.7)
HGB BLD-MCNC: 10.7 G/DL (ref 13.3–17.7)
HGB BLD-MCNC: 9.9 G/DL (ref 13.3–17.7)
HGB UR QL STRIP: ABNORMAL
INR PPP: 1.92 (ref 0.86–1.14)
INR PPP: 2.06 (ref 0.86–1.14)
INTERPRETATION ECG - MUSE: NORMAL
INTERPRETATION ECG - MUSE: NORMAL
KETONES UR STRIP-MCNC: NEGATIVE MG/DL
LACTATE BLD-SCNC: 2.1 MMOL/L (ref 0.7–2.1)
LACTATE BLD-SCNC: 2.2 MMOL/L (ref 0.7–2.1)
LACTATE BLD-SCNC: 2.2 MMOL/L (ref 0.7–2.1)
LACTATE BLD-SCNC: 2.6 MMOL/L (ref 0.7–2.1)
LACTATE BLD-SCNC: 2.9 MMOL/L (ref 0.7–2.1)
LEUKOCYTE ESTERASE UR QL STRIP: ABNORMAL
MAGNESIUM SERPL-MCNC: 2.3 MG/DL (ref 1.6–2.3)
MAGNESIUM SERPL-MCNC: 2.5 MG/DL (ref 1.6–2.3)
MCH RBC QN AUTO: 30 PG (ref 26.5–33)
MCH RBC QN AUTO: 30.1 PG (ref 26.5–33)
MCH RBC QN AUTO: 30.3 PG (ref 26.5–33)
MCHC RBC AUTO-ENTMCNC: 32.3 G/DL (ref 31.5–36.5)
MCHC RBC AUTO-ENTMCNC: 33 G/DL (ref 31.5–36.5)
MCHC RBC AUTO-ENTMCNC: 33.3 G/DL (ref 31.5–36.5)
MCV RBC AUTO: 91 FL (ref 78–100)
MCV RBC AUTO: 91 FL (ref 78–100)
MCV RBC AUTO: 93 FL (ref 78–100)
MICRO REPORT STATUS: ABNORMAL
MUCOUS THREADS #/AREA URNS LPF: PRESENT /LPF
NITRATE UR QL: NEGATIVE
O2/TOTAL GAS SETTING VFR VENT: 40 %
OXYHGB MFR BLDV: 50 %
OXYHGB MFR BLDV: 53 %
OXYHGB MFR BLDV: 53 %
OXYHGB MFR BLDV: 55 %
OXYHGB MFR BLDV: 57 %
OXYHGB MFR BLDV: 61 %
PCO2 BLDV: 35 MM HG (ref 40–50)
PCO2 BLDV: 36 MM HG (ref 40–50)
PCO2 BLDV: 39 MM HG (ref 40–50)
PCO2 BLDV: 41 MM HG (ref 40–50)
PCO2 BLDV: 42 MM HG (ref 40–50)
PCO2 BLDV: 43 MM HG (ref 40–50)
PH BLDV: 7.42 PH (ref 7.32–7.43)
PH BLDV: 7.46 PH (ref 7.32–7.43)
PH BLDV: 7.48 PH (ref 7.32–7.43)
PH BLDV: 7.49 PH (ref 7.32–7.43)
PH BLDV: 7.52 PH (ref 7.32–7.43)
PH BLDV: 7.52 PH (ref 7.32–7.43)
PH UR STRIP: 5 PH (ref 5–7)
PHOSPHATE SERPL-MCNC: 1.5 MG/DL (ref 2.5–4.5)
PHOSPHATE SERPL-MCNC: 1.8 MG/DL (ref 2.5–4.5)
PLATELET # BLD AUTO: 104 10E9/L (ref 150–450)
PLATELET # BLD AUTO: 109 10E9/L (ref 150–450)
PLATELET # BLD AUTO: 97 10E9/L (ref 150–450)
PLATELET # BLD AUTO: 98 10E9/L (ref 150–450)
PO2 BLDV: 27 MM HG (ref 25–47)
PO2 BLDV: 30 MM HG (ref 25–47)
PO2 BLDV: 30 MM HG (ref 25–47)
PO2 BLDV: 32 MM HG (ref 25–47)
POTASSIUM BLD-SCNC: 3.3 MMOL/L (ref 3.4–5.3)
POTASSIUM BLD-SCNC: 3.6 MMOL/L (ref 3.4–5.3)
POTASSIUM BLD-SCNC: 3.6 MMOL/L (ref 3.4–5.3)
POTASSIUM BLD-SCNC: 3.9 MMOL/L (ref 3.4–5.3)
POTASSIUM BLD-SCNC: 4.4 MMOL/L (ref 3.4–5.3)
POTASSIUM BLD-SCNC: 4.7 MMOL/L (ref 3.4–5.3)
POTASSIUM SERPL-SCNC: 3.4 MMOL/L (ref 3.4–5.3)
POTASSIUM SERPL-SCNC: 3.5 MMOL/L (ref 3.4–5.3)
POTASSIUM SERPL-SCNC: 4.6 MMOL/L (ref 3.4–5.3)
PROT SERPL-MCNC: 5.5 G/DL (ref 6.8–8.8)
RBC # BLD AUTO: 3.36 10E12/L (ref 4.4–5.9)
RBC # BLD AUTO: 3.47 10E12/L (ref 4.4–5.9)
RBC # BLD AUTO: 3.53 10E12/L (ref 4.4–5.9)
RBC #/AREA URNS AUTO: 15 /HPF (ref 0–2)
SODIUM SERPL-SCNC: 149 MMOL/L (ref 133–144)
SODIUM SERPL-SCNC: 151 MMOL/L (ref 133–144)
SODIUM SERPL-SCNC: 154 MMOL/L (ref 133–144)
SP GR UR STRIP: 1.02 (ref 1–1.03)
SPECIMEN SOURCE: ABNORMAL
SQUAMOUS #/AREA URNS AUTO: <1 /HPF (ref 0–1)
TRANS CELLS #/AREA URNS HPF: <1 /HPF (ref 0–1)
URN SPEC COLLECT METH UR: ABNORMAL
UROBILINOGEN UR STRIP-MCNC: NORMAL MG/DL (ref 0–2)
VANCOMYCIN SERPL-MCNC: 15.6 MG/L
WBC # BLD AUTO: 17.2 10E9/L (ref 4–11)
WBC # BLD AUTO: 17.6 10E9/L (ref 4–11)
WBC # BLD AUTO: 18.2 10E9/L (ref 4–11)
WBC #/AREA URNS AUTO: 4 /HPF (ref 0–2)

## 2017-03-29 PROCEDURE — 25000128 H RX IP 250 OP 636: Performed by: INTERNAL MEDICINE

## 2017-03-29 PROCEDURE — 83735 ASSAY OF MAGNESIUM: CPT | Performed by: INTERNAL MEDICINE

## 2017-03-29 PROCEDURE — 82330 ASSAY OF CALCIUM: CPT | Performed by: INTERNAL MEDICINE

## 2017-03-29 PROCEDURE — 40000264 ECHO LIMITED WITH OPTISON

## 2017-03-29 PROCEDURE — 80202 ASSAY OF VANCOMYCIN: CPT | Performed by: INTERNAL MEDICINE

## 2017-03-29 PROCEDURE — 36415 COLL VENOUS BLD VENIPUNCTURE: CPT | Performed by: INTERNAL MEDICINE

## 2017-03-29 PROCEDURE — 25500064 ZZH RX 255 OP 636: Performed by: INTERNAL MEDICINE

## 2017-03-29 PROCEDURE — 25800025 ZZH RX 258: Performed by: INTERNAL MEDICINE

## 2017-03-29 PROCEDURE — 40000076 ZZH STATISTIC IABP MONITORING

## 2017-03-29 PROCEDURE — 84132 ASSAY OF SERUM POTASSIUM: CPT | Performed by: INTERNAL MEDICINE

## 2017-03-29 PROCEDURE — 00000146 ZZHCL STATISTIC GLUCOSE BY METER IP

## 2017-03-29 PROCEDURE — 83605 ASSAY OF LACTIC ACID: CPT | Performed by: INTERNAL MEDICINE

## 2017-03-29 PROCEDURE — 25000132 ZZH RX MED GY IP 250 OP 250 PS 637: Performed by: INTERNAL MEDICINE

## 2017-03-29 PROCEDURE — 80048 BASIC METABOLIC PNL TOTAL CA: CPT | Performed by: INTERNAL MEDICINE

## 2017-03-29 PROCEDURE — 87205 SMEAR GRAM STAIN: CPT | Performed by: INTERNAL MEDICINE

## 2017-03-29 PROCEDURE — 93321 DOPPLER ECHO F-UP/LMTD STD: CPT | Mod: 26 | Performed by: INTERNAL MEDICINE

## 2017-03-29 PROCEDURE — 25000125 ZZHC RX 250: Performed by: INTERNAL MEDICINE

## 2017-03-29 PROCEDURE — 25000125 ZZHC RX 250

## 2017-03-29 PROCEDURE — 82248 BILIRUBIN DIRECT: CPT | Performed by: INTERNAL MEDICINE

## 2017-03-29 PROCEDURE — 40000275 ZZH STATISTIC RCP TIME EA 10 MIN

## 2017-03-29 PROCEDURE — 25000128 H RX IP 250 OP 636: Performed by: STUDENT IN AN ORGANIZED HEALTH CARE EDUCATION/TRAINING PROGRAM

## 2017-03-29 PROCEDURE — 82805 BLOOD GASES W/O2 SATURATION: CPT | Performed by: INTERNAL MEDICINE

## 2017-03-29 PROCEDURE — 25000132 ZZH RX MED GY IP 250 OP 250 PS 637

## 2017-03-29 PROCEDURE — 71010 XR CHEST PORT 1 VW: CPT | Mod: 77

## 2017-03-29 PROCEDURE — 27210437 ZZH NUTRITION PRODUCT SEMIELEM INTERMED LITER

## 2017-03-29 PROCEDURE — 44500 INTRO GASTROINTESTINAL TUBE: CPT

## 2017-03-29 PROCEDURE — 25000128 H RX IP 250 OP 636

## 2017-03-29 PROCEDURE — 40000048 ZZH STATISTIC DAILY SWAN MONITORING

## 2017-03-29 PROCEDURE — 85027 COMPLETE CBC AUTOMATED: CPT | Performed by: INTERNAL MEDICINE

## 2017-03-29 PROCEDURE — 71010 XR CHEST PORT 1 VW: CPT

## 2017-03-29 PROCEDURE — 40000196 ZZH STATISTIC RAPCV CVP MONITORING

## 2017-03-29 PROCEDURE — 20000004 ZZH R&B ICU UMMC

## 2017-03-29 PROCEDURE — 94003 VENT MGMT INPAT SUBQ DAY: CPT

## 2017-03-29 PROCEDURE — 93005 ELECTROCARDIOGRAM TRACING: CPT

## 2017-03-29 PROCEDURE — 85610 PROTHROMBIN TIME: CPT | Performed by: INTERNAL MEDICINE

## 2017-03-29 PROCEDURE — 93010 ELECTROCARDIOGRAM REPORT: CPT | Performed by: INTERNAL MEDICINE

## 2017-03-29 PROCEDURE — 84100 ASSAY OF PHOSPHORUS: CPT | Performed by: INTERNAL MEDICINE

## 2017-03-29 PROCEDURE — 99291 CRITICAL CARE FIRST HOUR: CPT | Mod: 25 | Performed by: INTERNAL MEDICINE

## 2017-03-29 PROCEDURE — 99292 CRITICAL CARE ADDL 30 MIN: CPT | Mod: 25 | Performed by: INTERNAL MEDICINE

## 2017-03-29 PROCEDURE — 81001 URINALYSIS AUTO W/SCOPE: CPT | Performed by: INTERNAL MEDICINE

## 2017-03-29 PROCEDURE — 80053 COMPREHEN METABOLIC PANEL: CPT | Performed by: INTERNAL MEDICINE

## 2017-03-29 PROCEDURE — 87070 CULTURE OTHR SPECIMN AEROBIC: CPT | Performed by: INTERNAL MEDICINE

## 2017-03-29 PROCEDURE — 93325 DOPPLER ECHO COLOR FLOW MAPG: CPT | Mod: 26 | Performed by: INTERNAL MEDICINE

## 2017-03-29 PROCEDURE — 87077 CULTURE AEROBIC IDENTIFY: CPT | Performed by: INTERNAL MEDICINE

## 2017-03-29 PROCEDURE — 25000125 ZZHC RX 250: Performed by: STUDENT IN AN ORGANIZED HEALTH CARE EDUCATION/TRAINING PROGRAM

## 2017-03-29 PROCEDURE — 87040 BLOOD CULTURE FOR BACTERIA: CPT | Performed by: INTERNAL MEDICINE

## 2017-03-29 PROCEDURE — 93308 TTE F-UP OR LMTD: CPT | Mod: 26 | Performed by: INTERNAL MEDICINE

## 2017-03-29 PROCEDURE — 85730 THROMBOPLASTIN TIME PARTIAL: CPT | Performed by: INTERNAL MEDICINE

## 2017-03-29 PROCEDURE — 40000940 XR ABDOMEN PORT F1 VW

## 2017-03-29 RX ORDER — PIPERACILLIN SODIUM, TAZOBACTAM SODIUM 3; .375 G/15ML; G/15ML
3.38 INJECTION, POWDER, LYOPHILIZED, FOR SOLUTION INTRAVENOUS EVERY 6 HOURS
Status: DISCONTINUED | OUTPATIENT
Start: 2017-03-29 | End: 2017-03-30

## 2017-03-29 RX ORDER — FUROSEMIDE 10 MG/ML
40 INJECTION INTRAMUSCULAR; INTRAVENOUS ONCE
Status: COMPLETED | OUTPATIENT
Start: 2017-03-29 | End: 2017-03-29

## 2017-03-29 RX ORDER — DEXTROSE MONOHYDRATE 50 MG/ML
INJECTION, SOLUTION INTRAVENOUS CONTINUOUS
Status: DISCONTINUED | OUTPATIENT
Start: 2017-03-29 | End: 2017-03-30

## 2017-03-29 RX ORDER — HEPARIN SODIUM 5000 [USP'U]/.5ML
5000 INJECTION, SOLUTION INTRAVENOUS; SUBCUTANEOUS EVERY 12 HOURS
Status: DISCONTINUED | OUTPATIENT
Start: 2017-03-29 | End: 2017-03-30

## 2017-03-29 RX ADMIN — HYDROCORTISONE SODIUM SUCCINATE 50 MG: 100 INJECTION, POWDER, FOR SOLUTION INTRAMUSCULAR; INTRAVENOUS at 01:25

## 2017-03-29 RX ADMIN — SENNOSIDES AND DOCUSATE SODIUM 1 TABLET: 8.6; 5 TABLET ORAL at 07:52

## 2017-03-29 RX ADMIN — PIPERACILLIN SODIUM,TAZOBACTAM SODIUM 4.5 G: 4; .5 INJECTION, POWDER, FOR SOLUTION INTRAVENOUS at 04:35

## 2017-03-29 RX ADMIN — DEXTROSE MONOHYDRATE 50 ML: 25 INJECTION, SOLUTION INTRAVENOUS at 17:27

## 2017-03-29 RX ADMIN — PIPERACILLIN SODIUM,TAZOBACTAM SODIUM 3.38 G: 3; .375 INJECTION, POWDER, FOR SOLUTION INTRAVENOUS at 10:33

## 2017-03-29 RX ADMIN — PHYTONADIONE 5 MG: 10 INJECTION, EMULSION INTRAMUSCULAR; INTRAVENOUS; SUBCUTANEOUS at 19:06

## 2017-03-29 RX ADMIN — CLOPIDOGREL 75 MG: 75 TABLET, FILM COATED ORAL at 07:52

## 2017-03-29 RX ADMIN — HEPARIN SODIUM 5000 UNITS: 5000 INJECTION, SOLUTION INTRAVENOUS; SUBCUTANEOUS at 10:24

## 2017-03-29 RX ADMIN — PANTOPRAZOLE SODIUM 40 MG: 40 INJECTION, POWDER, FOR SOLUTION INTRAVENOUS at 07:51

## 2017-03-29 RX ADMIN — SENNOSIDES AND DOCUSATE SODIUM 1 TABLET: 8.6; 5 TABLET ORAL at 19:51

## 2017-03-29 RX ADMIN — MULTIVIT AND MINERALS-FERROUS GLUCONATE 9 MG IRON/15 ML ORAL LIQUID 15 ML: at 14:18

## 2017-03-29 RX ADMIN — LIDOCAINE HYDROCHLORIDE 3 ML: 20 SOLUTION ORAL; TOPICAL at 10:51

## 2017-03-29 RX ADMIN — FUROSEMIDE 40 MG: 10 INJECTION, SOLUTION INTRAVENOUS at 22:39

## 2017-03-29 RX ADMIN — POTASSIUM CHLORIDE 40 MEQ: 1.5 POWDER, FOR SOLUTION ORAL at 07:54

## 2017-03-29 RX ADMIN — ASPIRIN 81 MG CHEWABLE TABLET 81 MG: 81 TABLET CHEWABLE at 07:52

## 2017-03-29 RX ADMIN — HYDROCORTISONE SODIUM SUCCINATE 50 MG: 100 INJECTION, POWDER, FOR SOLUTION INTRAMUSCULAR; INTRAVENOUS at 07:51

## 2017-03-29 RX ADMIN — FUROSEMIDE 40 MG: 10 INJECTION, SOLUTION INTRAVENOUS at 18:41

## 2017-03-29 RX ADMIN — PIPERACILLIN SODIUM,TAZOBACTAM SODIUM 3.38 G: 3; .375 INJECTION, POWDER, FOR SOLUTION INTRAVENOUS at 21:49

## 2017-03-29 RX ADMIN — DOPAMINE HYDROCHLORIDE 6 MCG/KG/MIN: 160 INJECTION, SOLUTION INTRAVENOUS at 09:05

## 2017-03-29 RX ADMIN — POTASSIUM PHOSPHATE, MONOBASIC AND POTASSIUM PHOSPHATE, DIBASIC 20 MMOL: 224; 236 INJECTION, SOLUTION INTRAVENOUS at 15:12

## 2017-03-29 RX ADMIN — VANCOMYCIN HYDROCHLORIDE 1250 MG: 10 INJECTION, POWDER, LYOPHILIZED, FOR SOLUTION INTRAVENOUS at 13:36

## 2017-03-29 RX ADMIN — POTASSIUM CHLORIDE 20 MEQ: 29.8 INJECTION, SOLUTION INTRAVENOUS at 04:42

## 2017-03-29 RX ADMIN — HUMAN INSULIN 3 UNITS/HR: 100 INJECTION, SOLUTION SUBCUTANEOUS at 13:57

## 2017-03-29 RX ADMIN — DEXMEDETOMIDINE 0.2 MCG/KG/HR: 100 INJECTION, SOLUTION, CONCENTRATE INTRAVENOUS at 10:50

## 2017-03-29 RX ADMIN — DEXTROSE MONOHYDRATE: 50 INJECTION, SOLUTION INTRAVENOUS at 14:18

## 2017-03-29 RX ADMIN — HUMAN ALBUMIN MICROSPHERES AND PERFLUTREN 5 ML: 10; .22 INJECTION, SOLUTION INTRAVENOUS at 09:15

## 2017-03-29 RX ADMIN — PANTOPRAZOLE SODIUM 40 MG: 40 INJECTION, POWDER, FOR SOLUTION INTRAVENOUS at 19:51

## 2017-03-29 RX ADMIN — POTASSIUM CHLORIDE 20 MEQ: 29.8 INJECTION, SOLUTION INTRAVENOUS at 06:50

## 2017-03-29 RX ADMIN — FUROSEMIDE 40 MG: 10 INJECTION, SOLUTION INTRAVENOUS at 01:22

## 2017-03-29 RX ADMIN — PIPERACILLIN SODIUM,TAZOBACTAM SODIUM 3.38 G: 3; .375 INJECTION, POWDER, FOR SOLUTION INTRAVENOUS at 16:09

## 2017-03-29 RX ADMIN — HYDROCORTISONE SODIUM SUCCINATE 50 MG: 100 INJECTION, POWDER, FOR SOLUTION INTRAMUSCULAR; INTRAVENOUS at 14:18

## 2017-03-29 RX ADMIN — FENTANYL CITRATE 75 MCG/HR: 50 INJECTION, SOLUTION INTRAMUSCULAR; INTRAVENOUS at 00:09

## 2017-03-29 RX ADMIN — FENTANYL CITRATE 100 MCG/HR: 50 INJECTION, SOLUTION INTRAMUSCULAR; INTRAVENOUS at 14:04

## 2017-03-29 RX ADMIN — CALCIUM CHLORIDE 1 G: 100 INJECTION, SOLUTION INTRAVENOUS at 07:41

## 2017-03-29 RX ADMIN — HYDROCORTISONE SODIUM SUCCINATE 50 MG: 100 INJECTION, POWDER, FOR SOLUTION INTRAMUSCULAR; INTRAVENOUS at 19:51

## 2017-03-29 RX ADMIN — HEPARIN SODIUM 5000 UNITS: 5000 INJECTION, SOLUTION INTRAVENOUS; SUBCUTANEOUS at 21:49

## 2017-03-29 NOTE — PROGRESS NOTES
"Cardiology Progress Note    Events and interval changes in past 24 hours:   Continued to spike fevers at night. Hasn't required pacing. Overnight, report by RN and RT there was mild cuff leak    Tm 38.8 HR 110s MAP 70-80s  IABP 1:1 MAP 65-75s  CMV 12/450/5/40 ABG 7.52/32/36/29 lactate 2.9    CVP   PA  PCW  SvO2  BSA 1.6 Hgb 11.6   CI   CO   SVR   PVR    Last night  CVP 16 MAP 70  PA 30/22/25  PCW  SvO2 61% (3am) BSA 1.6 Hgb 10.4   CI 2.3   CO 3.6  SVR 1200  PVR    Drips  Dopamine 7  Dobutamine 2.5  Epi off  Phenylephrine off  Versed 8, fentanyl 100  Insulin drip    CXR: no pulm edema, [place], ETT, IABP, enteric tube in stomach  I/O 2.5/4.1 since MN 0.6/0.3    OBJECTIVE FINDINGS:  /66  Temp 100.3  F (37.9  C) (Oral)  Resp 12  Ht 1.575 m (5' 2.01\")  Wt 63.1 kg (139 lb 1.8 oz)  SpO2 98%  BMI 25.44 kg/m2    Gen: intubated  CV: RRR, S1 & S2, systolic murmur in 2nd left ICS, LLB and apex  Lungs: CTAB anteriorly  Abd: slightly distended, soft  Ext: DP pulses not palpable but dopplerable per RN, no peripheral edema      Meds  Asa 81  Plavix 75  Hydrocortisone 50mg q6hrs  Zosyn,vanco  PPI BID    Wt Readings from Last 5 Encounters:   03/28/17 63.1 kg (139 lb 1.8 oz)   03/27/17 62 kg (136 lb 11 oz)   07/15/08 68.5 kg (151 lb)         Labs and Imaging  Reviewed in epic  Flu swab negative    Echo 3/27  The visual ejection fraction is estimated at 30-35%.  There is extensive inferior, inferoseptal, and inferolateral wall akinesis.  Basal/mid lateral wall hypokinesis  There is moderate to severe septal hypokinesis.  Septal wall motion abnormality may reflect pacemaker activation.  There is a catheter/pacemaker lead seen in the right ventricle.  The right ventricle is mildly dilated.  Severely decreased right ventricular systolic function  There is no pericardial effusion.  The rhythm was paced.  Compared to the prior study, the LVEF appears somewhat decreased. Septal wall  motion abnormality larger- may reflect, in part, " that patient in sinus tach on  prior study, and ventricular paced now. No hemodynamically significant  valvular abnormalities on 2D or color flow imaging    CT chest/abdomen 3/27   IMPRESSION:   1. Small mediastinal hemorrhage, small bilateral pleural effusions  which may be hemorrhagic. Small intraperitoneal hemorrhage. Minimal  pericardial hemorrhage.  2. Minimal pneumoperitoneum in the left paracolic gutter, of unknown  significance. No pneumatosis as clinically questioned.  3. IABP slightly low in position.  4. Bilateral pulmonary dependent consolidations represent compression  atelectasis, however differentials include aspiration.    Cath 3/26 [facility]  SUMMARY:   --Inferior STEMI w/ late presentation. Culprit dictated by complete  heart block, and cardiogenic shock. Emergent echo in the Cath Lab also  shows significant RV dysfunction.  --Three vessel coronary artery disease with diffuse coronary disease  out to the distal vessels  --Successful POBA of the prox and mid-RCA. Stent was not placed, in  anticipation he may need to be considered for bypass surgery in the  near future  --Insertion of a temporary pacemaker for bradycardia (AVB) and  hypotension  --Insertion of a IABP  --Upper GI bleed consistent with Kaylin-Bonilla    [facility] 3/27    CT head 3/28: normal     Echo 3/29  Interpretation Summary  Limited study. Ischemic CM. Moderately (EF 30-35%) reduced left ventricular  function is present. Traced at 32%.  Posterior wall akinesis is present. Lateral wall akinesis is present. Inferior  wall akinesis is present.  Right ventricular function, chamber size, wall motion, and thickness are  normal.  Dilation of the inferior vena cava is present with abnormal respiratory  variation in diameter.     Compared to prior study, RV fxn is improved.      Plan for today  1) Goal SVR 1000 CI >2 MAP >70, Keep IABP on for today. Wean dobutamine and Dopamine to lower doses if possible  2) If CVP  consistently >16, diuresis again later today    3) Switch to precedex (from versed) since we want him to wake up and respond purposefully  4) start tube feeding   5) Redraw urine/blood/sputum cx today. f/u on old cultures. Sputum cx growing staph aureus. Continue vanco/zosyn for now  6) Suq heparin for DVT prophylaxis  7) ETT cuff leak fixed by RT today  8) temporary pacer removed. May need IJ central line    Card  # Mixed shock: Cardiogenic from biventricular failure from inferior STEMI and distributive shock from post-MI SIRS response vs aspiration pneumonia/pneumonitis from possible aspiration during cath at [facility] on 3/26  # Due to inferior wall STEMI. S/P 7 stents to LAD, circ, RCA (angiogram report pending). Now with IABP, temporary pacemaker after 3rd deg heart block in cath lab at [facility] on 3/26   # Small pericardial hemorrhage  - Continue IABP for coronary perfusion  - Leave in pacemaker for now  - Dual antiplatelet therapy with ASA 81mg and plavix 75mg qday  - Will adjust plan according to hemodynamics and further test results.      # Neuro  -Slow to wake up- likely from sedation in setting of shock liver,   Sedation versed gtt + PRNs  Analgesia fentanyl gtt + PRNs  Daily sedation holidays once stabilized        Respiratory  #Intubated for airway protection due to mental status and emesis on 3/26.   #Probable Aspiration PNA/pneumonitis  #Small mediastinal hemorrhage  Zosyn,vanco for Day 2/5        # ID  . Sepsis from aspiration pneumonia/pneumonitis vs MRSA pneumonia vs post-MI SIRS response  - repeat cultures if febrile  - Empirc vanc/zosyn, wean as able  - Hydrocortisone 50mg q6hr for BP support     # Endocrine  T2DM: HA1C 7.5 yesterday on admission. BG climibing to 170s since admission. Will likely continue to rise with steroids.   - Insulin gtt per nurising protocol with hypoglycemia precautions.      # GI  Minimal pneumoperitoneum, unclear etiology: Seen by General surgery.    -Conservative Mx for now    Shock liver injury with coagulopathy-improving trend LFTs,   -Vit K 2.5mg 3/28       # Renal/  Acute kidney injury-nonoliguric: multifactorial: hypoperfusion, contrast etc.  - Daily Cr  - Avoid nephrotoxins as able  - Chavis placed 3/26     FEN: Nutrition consult and NJ tube ordered  Code: FULL  PPx: hold of on chemical DVT PPx until bleeding risk decreases (no further hematemesis), PPI 40mg IV qday, senna PRN  Dispo: pending stability    Will staff with Dr. Talon Park  General Cardiology fellow, PGY4  Pager [phone]

## 2017-03-29 NOTE — PROCEDURES
Bridle Placement:   Reason for bridle placement: Secure FT per RN request   Medicine delivered during procedure: Lubricating jelly  Procedure: Successful  Location of top of clip on FT: @ 87 cm marker   Condition of nose/skin at time of bridle placement: Unremarkable   Face to Face time with patient: < 5 minutes    Mariangel Salmon RD, LD (pager [phone])

## 2017-03-29 NOTE — PROGRESS NOTES
CLINICAL NUTRITION SERVICES - BRIEF NOTE    Received consult for RD to assess and order TF per medical nutrition therapy protocol    -EN access: Small bore nasal FT placed by RD with Olegario, awaiting AXR for position confirmation.  -Labs/Meds: Na elevated @ 154, K+ WNL @ 4.7, Mg++ elevated @ 2.5 and Phos not yet checked today. Phos last elevated @ 5.3 on 3/27. PRN lyte replacement protocols ordered. On dopamine and dobutamine with mean BPs > 60 and lactic acid elevated @ 2.6    INTERVENTIONS  Recommendations / Nutrition Prescription  1. Once FT position confirmed post-pyloric by MD/AXR, rec begin Peptamen 1.5 (semi-elemental, fiber free TF formula) @ 10 ml/hr x 24 hrs and if tolerates trophic rate for 24 hrs then begin adv by 10 ml q 8 hrs as tolerated and if K+/Mg++ remain WNL and Phos > 1.9 until goal 50 ml/hr (1200 ml/day) to provide 1800 kcal (29 kcal/kg), 82 g PRO (1.3 g/kg), 924 ml free H2O, 67 g Fat (70% from MCTs), 226 g CHO and no Fiber daily per dosing weight of 62 kg.      2. Once begin TF, flush FT with at least 30 ml water q 4 hrs for patency. Further adjustments per MD/provider discretion.    3. Once begin TF, ensure K+/Mg++/Phos ordered daily (BMP, Mg++ currently ordered) until TFs adv to goal infusion to evaluate for sx of refeeding with nutrition received, need for lyte replacement per already-ordered lyte protocols and approp to continue to adv nutritional provisions.     4. Once begin TF, order multivitamin/mineral (15 ml/day via FT) to help ensure micronutrient needs being met with suspected hypermetabolic demands and potential interruptions to TF infusions.    Implementation  Collaboration with other providers- Nutrition POC discussed with Cards 2 team, plan for FT placement and TF start today.  Enteral Nutrition - Initiate- Ordered TF as rec above.  Feeding tube flush- Ordered free water as rec above.  Labs- Ordered Phos add-on and daily monitoring x3 occurrences as rec  above.  Multivitamin/mineral supplement therapy- Ordered Certavite as rec above.       Mariangel Salmon RD, LD (pager [phone])  RD will continue to follow

## 2017-03-29 NOTE — PLAN OF CARE
Problem: Goal Outcome Summary  Goal: Goal Outcome Summary  Outcome: Declining  Pt became agitated at 1925. Versed was off. Pt was biting ET tube. Airway pressure alarms on vent. Pt would bear down and MAPs would drop into the 50s. Pts body would completely shake and patient would lift legs up off the bed. RT & Cardiology fellow notified. Bite block applied. Versed restarted. Pt cont to have frequent episodes of shaking & MAPs in the 50s throughout the NOC. Patient would bend legs frequently. Immobilizer placed on R leg with IABP.  Versed at 8. Fentanyl increased to 75. Shaking still continued with ET suctioning & repositioning. Fentanyl increased to 100 per recommendation of Cardiology fellow. Febrile with temp as high as 101.8. Ice packed, fan & cooling blanket ordered. Temp 99.0 at 0600. PERRL 2mm. Pt did not follow commands with versed off. MAP goal of >65. Dopamine & Dobutamine titrated as indicated. ST with rates 100-130s. IABP 1:1. See flowsheets. PA pressures 26/20, 30/22, 26/20. CVP 13,16,13. CO 3.7-5.3 CI 2.2-3.2. SVO2- 59,71,61 Lungs clear & diminished bilat. :12:5:40%. Productive cough with tan secretions from ET tube. ET tube cuff leak noted around 0100. Could not add air to cuff. RT notified. CXR taken. Correct placement verified. Only 1cc in patients ET cuff currently. Cardiology fellow notified. 02 saturation maintained %. -600. No s/sx of decreased airflow. UOP- 25-100cc/hr. 40 IV lasix given.

## 2017-03-29 NOTE — PHARMACY-CONSULT NOTE
Pharmacy Tube Feeding Consult    Medication reviewed for administration by feeding tube and for potential food/drug interactions.  Recommendation: No changes are needed at this time.   Pharmacy will continue to follow as new medications are ordered.    Yola Peraza, PharmD, BCCCP

## 2017-03-29 NOTE — PHARMACY-VANCOMYCIN DOSING SERVICE
Pharmacy Vancomycin Note  Date of Service 2017  Patient's  1965   51 year old, male    Indication: Sepsis  Goal Trough Level: 15-20 mg/L  Day of Therapy: 3  Current Vancomycin regimen:  1250 mg IV q18h    Current estimated CrCl = Estimated Creatinine Clearance: 39.1 mL/min (based on Cr of 2.04).    Creatinine for last 3 days  3/26/2017:  6:00 PM Creatinine 2.33 mg/dL; 10:53 PM Creatinine 1.60 mg/dL  3/27/2017:  5:51 AM Creatinine 1.42 mg/dL;  7:12 PM Creatinine 1.63 mg/dL;  8:55 PM Creatinine 1.68 mg/dL; 11:11 PM Creatinine 1.74 mg/dL  3/28/2017:  4:30 AM Creatinine 1.67 mg/dL; 12:33 PM Creatinine 1.62 mg/dL;  8:20 PM Creatinine 1.85 mg/dL  3/29/2017:  3:26 AM Creatinine 2.04 mg/dL    Recent Vancomycin Levels (past 3 days)  3/29/2017:  9:59 AM Vancomycin Level 15.6 mg/L    Vancomycin IV Administrations (past 72 hours)                   vancomycin (VANCOCIN) 1,250 mg in D5W 250 mL intermittent infusion (mg) 1,250 mg New Bag 17 1715    vancomycin (VANCOCIN) 1,500 mg in NaCl 0.9 % 250 mL intermittent infusion (mg) 1,500 mg New Bag 17 2328                Nephrotoxins and other renal medications (Future)    Start     Dose/Rate Route Frequency Ordered Stop    17 1000  piperacillin-tazobactam (ZOSYN) 3.375 g vial to attach to  mL bag      3.375 g  over 1 Hours Intravenous EVERY 6 HOURS 17 0629      17 1700  vancomycin (VANCOCIN) 1,250 mg in D5W 250 mL intermittent infusion      1,250 mg Intravenous EVERY 18 HOURS 17 0937      17  EPINEPHrine (ADRENALIN) 5 mg in NaCl 0.9 % 250 mL infusion      0.01-0.3 mcg/kg/min × 62 kg (Dosing Weight)  1.9-55.8 mL/hr  Intravenous CONTINUOUS 17               Contrast Orders - past 72 hours (72h ago through future)    Start     Dose/Rate Route Frequency Ordered Stop    17 0915  perflutren diluted 1mL to 2mL with saline (OPTISON) diluted injection 5 mL      5 mL Intravenous ONCE 17 0914 17  0915    03/27/17 1830  iopamidol (ISOVUE-370) solution 200 mL      200 mL INTRA-ARTERIAL ONCE 03/27/17 1824 03/27/17 1830          Interpretation of levels and current regimen:  Trough level is  Therapeutic (not yet at steady state)    Has serum creatinine changed > 50% in last 72 hours: No    Urine output:  0.8 mL/kg/hr since 0000 today    Renal Function: Worsening    Plan:  1.  Continue Current Dose of 1250 mg IV q18h  2.  Pharmacy will check trough levels as appropriate in 1-3 Days.    3. Serum creatinine levels will be ordered daily for the first week of therapy and at least twice weekly for subsequent weeks.      Uma Arredondo, PharmD IV Student        .

## 2017-03-29 NOTE — PROCEDURES
Small Bowel Feeding Tube Placement Assessment  Reason for Feeding Tube Placement: Team desires post-pyloric FT for nutrition support as intubated with IABP in place  Cortrak Start Time: 10:06 am   Cortrak End Time: 10:18 am  Medicine Delivered During Procedure: Lidocaine   Placement Successful:  Presume post-pyloric (pending AXR confirmation)    Procedure Complications: Initial difficulty getting FT past nasopharynx even with chin tuck alone, able to adv FT into esophagus with use of tongue depressor and chin tuck by RN  Final Placement Glenn at exit of nare: 86 cm  Face to Face time with patient: 30 minutes      Mariangel Salmon RD, LD (pager [phone])

## 2017-03-29 NOTE — PLAN OF CARE
Problem: Goal Outcome Summary  Goal: Goal Outcome Summary  Outcome: Improving  D/I: Hemodynamics stable/ unchanged today: CVP 12, PAP 26/20, CI 1.9-2.2, SVO2 50s. Maintained IABP mean and B/P MAP 70s-80s weaning Dopamine from 7-> 2 mcg/kg/min.  Dobutamine gtt unchanged @ 2.5 mcg/kg/min. HR 120s- 90s. Afeb. Mild brief tremors noted this am, but no further. Has remained non- responsive today, except minimal movements in response to stimuli (ie: suctioning, repositioning). Sedation lightened:  Versed gtt off. Precedex @ 0.2 started. Fentanyl gtt decreased from 100-> 50 mcg/hr. UV adequate @ 25-40cc/h. ECHO done: RV normal fxn; LV non-dilated w/ EF 30-35 % per report. MD d/trae temp transvenous pacer wires. RFV sheath d/trae. Site soft, flat, dry. Post- pyloric FT placed and TF strated @ 10cc/h.  Noted small amt bright red blood per OGT MD notified. Hgb 9.9. Vit K po dose ordered. Family updated by MDs.   A: B/P much more stable today. Very slow to arouse from sedation.   P: continue to monitor hemodynamics. Monitor for change in LOC.

## 2017-03-29 NOTE — PLAN OF CARE
Problem: Goal Outcome Summary  Goal: Goal Outcome Summary  PT/4e: PT consult received. Pt is not appropriate for skilled PT intervention at this time, B groin lines including IABP in place. Cx.

## 2017-03-30 ENCOUNTER — APPOINTMENT (OUTPATIENT)
Dept: OCCUPATIONAL THERAPY | Facility: CLINIC | Age: 52
DRG: 228 | End: 2017-03-30
Attending: INTERNAL MEDICINE
Payer: COMMERCIAL

## 2017-03-30 ENCOUNTER — APPOINTMENT (OUTPATIENT)
Dept: GENERAL RADIOLOGY | Facility: CLINIC | Age: 52
DRG: 228 | End: 2017-03-30
Attending: INTERNAL MEDICINE
Payer: COMMERCIAL

## 2017-03-30 LAB
ALBUMIN SERPL-MCNC: 2.6 G/DL (ref 3.4–5)
ALP SERPL-CCNC: 62 U/L (ref 40–150)
ALT SERPL W P-5'-P-CCNC: 3721 U/L (ref 0–70)
ANION GAP SERPL CALCULATED.3IONS-SCNC: 10 MMOL/L (ref 3–14)
ANION GAP SERPL CALCULATED.3IONS-SCNC: 12 MMOL/L (ref 3–14)
APTT PPP: 33 SEC (ref 22–37)
AST SERPL W P-5'-P-CCNC: 2562 U/L (ref 0–45)
BACTERIA SPEC CULT: ABNORMAL
BASE EXCESS BLDV CALC-SCNC: 2.5 MMOL/L
BASE EXCESS BLDV CALC-SCNC: 3 MMOL/L
BASE EXCESS BLDV CALC-SCNC: 3.2 MMOL/L
BASE EXCESS BLDV CALC-SCNC: 3.2 MMOL/L
BASE EXCESS BLDV CALC-SCNC: 3.5 MMOL/L
BASE EXCESS BLDV CALC-SCNC: 3.5 MMOL/L
BASE EXCESS BLDV CALC-SCNC: 3.8 MMOL/L
BASE EXCESS BLDV CALC-SCNC: 4.2 MMOL/L
BASE EXCESS BLDV CALC-SCNC: 4.2 MMOL/L
BASE EXCESS BLDV CALC-SCNC: 4.5 MMOL/L
BILIRUB DIRECT SERPL-MCNC: 0.4 MG/DL (ref 0–0.2)
BILIRUB SERPL-MCNC: 1.1 MG/DL (ref 0.2–1.3)
BUN SERPL-MCNC: 49 MG/DL (ref 7–30)
BUN SERPL-MCNC: 57 MG/DL (ref 7–30)
CA-I BLD-MCNC: 4.2 MG/DL (ref 4.4–5.2)
CALCIUM SERPL-MCNC: 7 MG/DL (ref 8.5–10.1)
CALCIUM SERPL-MCNC: 7.8 MG/DL (ref 8.5–10.1)
CHLORIDE SERPL-SCNC: 111 MMOL/L (ref 94–109)
CHLORIDE SERPL-SCNC: 112 MMOL/L (ref 94–109)
CO2 SERPL-SCNC: 26 MMOL/L (ref 20–32)
CO2 SERPL-SCNC: 27 MMOL/L (ref 20–32)
CREAT SERPL-MCNC: 2.85 MG/DL (ref 0.66–1.25)
CREAT SERPL-MCNC: 3.11 MG/DL (ref 0.66–1.25)
ERYTHROCYTE [DISTWIDTH] IN BLOOD BY AUTOMATED COUNT: 14.2 % (ref 10–15)
ERYTHROCYTE [DISTWIDTH] IN BLOOD BY AUTOMATED COUNT: 14.3 % (ref 10–15)
GFR SERPL CREATININE-BSD FRML MDRD: 21 ML/MIN/1.7M2
GFR SERPL CREATININE-BSD FRML MDRD: 24 ML/MIN/1.7M2
GLUCOSE BLDC GLUCOMTR-MCNC: 119 MG/DL (ref 70–99)
GLUCOSE BLDC GLUCOMTR-MCNC: 120 MG/DL (ref 70–99)
GLUCOSE BLDC GLUCOMTR-MCNC: 130 MG/DL (ref 70–99)
GLUCOSE BLDC GLUCOMTR-MCNC: 136 MG/DL (ref 70–99)
GLUCOSE BLDC GLUCOMTR-MCNC: 139 MG/DL (ref 70–99)
GLUCOSE BLDC GLUCOMTR-MCNC: 156 MG/DL (ref 70–99)
GLUCOSE BLDC GLUCOMTR-MCNC: 159 MG/DL (ref 70–99)
GLUCOSE BLDC GLUCOMTR-MCNC: 162 MG/DL (ref 70–99)
GLUCOSE BLDC GLUCOMTR-MCNC: 162 MG/DL (ref 70–99)
GLUCOSE BLDC GLUCOMTR-MCNC: 168 MG/DL (ref 70–99)
GLUCOSE BLDC GLUCOMTR-MCNC: 173 MG/DL (ref 70–99)
GLUCOSE BLDC GLUCOMTR-MCNC: 173 MG/DL (ref 70–99)
GLUCOSE BLDC GLUCOMTR-MCNC: 179 MG/DL (ref 70–99)
GLUCOSE BLDC GLUCOMTR-MCNC: 181 MG/DL (ref 70–99)
GLUCOSE BLDC GLUCOMTR-MCNC: 183 MG/DL (ref 70–99)
GLUCOSE BLDC GLUCOMTR-MCNC: 196 MG/DL (ref 70–99)
GLUCOSE BLDC GLUCOMTR-MCNC: 199 MG/DL (ref 70–99)
GLUCOSE BLDC GLUCOMTR-MCNC: 203 MG/DL (ref 70–99)
GLUCOSE SERPL-MCNC: 138 MG/DL (ref 70–99)
GLUCOSE SERPL-MCNC: 183 MG/DL (ref 70–99)
HCO3 BLDV-SCNC: 27 MMOL/L (ref 21–28)
HCO3 BLDV-SCNC: 28 MMOL/L (ref 21–28)
HCO3 BLDV-SCNC: 29 MMOL/L (ref 21–28)
HCT VFR BLD AUTO: 28.2 % (ref 40–53)
HCT VFR BLD AUTO: 31.2 % (ref 40–53)
HGB BLD-MCNC: 8.6 G/DL (ref 13.3–17.7)
HGB BLD-MCNC: 9.2 G/DL (ref 13.3–17.7)
HGB BLD-MCNC: 9.9 G/DL (ref 13.3–17.7)
INR PPP: 1.73 (ref 0.86–1.14)
INTERPRETATION ECG - MUSE: NORMAL
LACTATE BLD-SCNC: 1.7 MMOL/L (ref 0.7–2.1)
LACTATE BLD-SCNC: 1.8 MMOL/L (ref 0.7–2.1)
LACTATE BLD-SCNC: 2.2 MMOL/L (ref 0.7–2.1)
LACTATE BLD-SCNC: 2.2 MMOL/L (ref 0.7–2.1)
LMWH PPP CHRO-ACNC: NORMAL IU/ML
Lab: ABNORMAL
MAGNESIUM SERPL-MCNC: 2.2 MG/DL (ref 1.6–2.3)
MAGNESIUM SERPL-MCNC: 2.3 MG/DL (ref 1.6–2.3)
MAGNESIUM SERPL-MCNC: 2.3 MG/DL (ref 1.6–2.3)
MCH RBC QN AUTO: 29.5 PG (ref 26.5–33)
MCH RBC QN AUTO: 30.2 PG (ref 26.5–33)
MCHC RBC AUTO-ENTMCNC: 31.7 G/DL (ref 31.5–36.5)
MCHC RBC AUTO-ENTMCNC: 32.6 G/DL (ref 31.5–36.5)
MCV RBC AUTO: 93 FL (ref 78–100)
MCV RBC AUTO: 93 FL (ref 78–100)
MICRO REPORT STATUS: ABNORMAL
MICROORGANISM SPEC CULT: ABNORMAL
O2/TOTAL GAS SETTING VFR VENT: 40 %
OXYHGB MFR BLDV: 43 %
OXYHGB MFR BLDV: 44 %
OXYHGB MFR BLDV: 47 %
OXYHGB MFR BLDV: 48 %
OXYHGB MFR BLDV: 50 %
OXYHGB MFR BLDV: 52 %
OXYHGB MFR BLDV: 53 %
OXYHGB MFR BLDV: 55 %
PCO2 BLDV: 39 MM HG (ref 40–50)
PCO2 BLDV: 40 MM HG (ref 40–50)
PCO2 BLDV: 40 MM HG (ref 40–50)
PCO2 BLDV: 41 MM HG (ref 40–50)
PCO2 BLDV: 43 MM HG (ref 40–50)
PCO2 BLDV: 45 MM HG (ref 40–50)
PCO2 BLDV: 45 MM HG (ref 40–50)
PCO2 BLDV: 46 MM HG (ref 40–50)
PCO2 BLDV: 47 MM HG (ref 40–50)
PCO2 BLDV: 50 MM HG (ref 40–50)
PH BLDV: 7.38 PH (ref 7.32–7.43)
PH BLDV: 7.4 PH (ref 7.32–7.43)
PH BLDV: 7.43 PH (ref 7.32–7.43)
PH BLDV: 7.43 PH (ref 7.32–7.43)
PH BLDV: 7.44 PH (ref 7.32–7.43)
PH BLDV: 7.44 PH (ref 7.32–7.43)
PH BLDV: 7.45 PH (ref 7.32–7.43)
PH BLDV: 7.46 PH (ref 7.32–7.43)
PHOSPHATE SERPL-MCNC: 3.7 MG/DL (ref 2.5–4.5)
PHOSPHATE SERPL-MCNC: 4.4 MG/DL (ref 2.5–4.5)
PLATELET # BLD AUTO: 82 10E9/L (ref 150–450)
PLATELET # BLD AUTO: 97 10E9/L (ref 150–450)
PO2 BLDV: 27 MM HG (ref 25–47)
PO2 BLDV: 28 MM HG (ref 25–47)
PO2 BLDV: 29 MM HG (ref 25–47)
PO2 BLDV: 30 MM HG (ref 25–47)
PO2 BLDV: 31 MM HG (ref 25–47)
PO2 BLDV: 31 MM HG (ref 25–47)
PO2 BLDV: 32 MM HG (ref 25–47)
PO2 BLDV: 32 MM HG (ref 25–47)
POTASSIUM BLD-SCNC: 3.5 MMOL/L (ref 3.4–5.3)
POTASSIUM BLD-SCNC: 3.6 MMOL/L (ref 3.4–5.3)
POTASSIUM BLD-SCNC: 3.6 MMOL/L (ref 3.4–5.3)
POTASSIUM BLD-SCNC: 3.8 MMOL/L (ref 3.4–5.3)
POTASSIUM BLD-SCNC: 3.9 MMOL/L (ref 3.4–5.3)
POTASSIUM BLD-SCNC: 3.9 MMOL/L (ref 3.4–5.3)
POTASSIUM SERPL-SCNC: 3.8 MMOL/L (ref 3.4–5.3)
POTASSIUM SERPL-SCNC: 3.9 MMOL/L (ref 3.4–5.3)
PROT SERPL-MCNC: 5.5 G/DL (ref 6.8–8.8)
RADIOLOGIST FLAGS: ABNORMAL
RBC # BLD AUTO: 3.05 10E12/L (ref 4.4–5.9)
RBC # BLD AUTO: 3.36 10E12/L (ref 4.4–5.9)
SODIUM SERPL-SCNC: 148 MMOL/L (ref 133–144)
SODIUM SERPL-SCNC: 150 MMOL/L (ref 133–144)
SPECIMEN SOURCE: ABNORMAL
TROPONIN I SERPL-MCNC: 84.23 UG/L (ref 0–0.04)
TROPONIN I SERPL-MCNC: 85.73 UG/L (ref 0–0.04)
TROPONIN I SERPL-MCNC: 89.84 UG/L (ref 0–0.04)
WBC # BLD AUTO: 15 10E9/L (ref 4–11)
WBC # BLD AUTO: 15.5 10E9/L (ref 4–11)

## 2017-03-30 PROCEDURE — 25000125 ZZHC RX 250: Performed by: STUDENT IN AN ORGANIZED HEALTH CARE EDUCATION/TRAINING PROGRAM

## 2017-03-30 PROCEDURE — 40000196 ZZH STATISTIC RAPCV CVP MONITORING

## 2017-03-30 PROCEDURE — 84100 ASSAY OF PHOSPHORUS: CPT | Performed by: INTERNAL MEDICINE

## 2017-03-30 PROCEDURE — 99291 CRITICAL CARE FIRST HOUR: CPT | Mod: GC | Performed by: INTERNAL MEDICINE

## 2017-03-30 PROCEDURE — 25000128 H RX IP 250 OP 636: Performed by: INTERNAL MEDICINE

## 2017-03-30 PROCEDURE — 82248 BILIRUBIN DIRECT: CPT | Performed by: INTERNAL MEDICINE

## 2017-03-30 PROCEDURE — 40000133 ZZH STATISTIC OT WARD VISIT

## 2017-03-30 PROCEDURE — 25000125 ZZHC RX 250: Performed by: INTERNAL MEDICINE

## 2017-03-30 PROCEDURE — 25000132 ZZH RX MED GY IP 250 OP 250 PS 637: Performed by: INTERNAL MEDICINE

## 2017-03-30 PROCEDURE — 27210577 ZZ H INTRODUCER MICRO SET

## 2017-03-30 PROCEDURE — 85027 COMPLETE CBC AUTOMATED: CPT | Performed by: INTERNAL MEDICINE

## 2017-03-30 PROCEDURE — 80053 COMPREHEN METABOLIC PANEL: CPT | Performed by: INTERNAL MEDICINE

## 2017-03-30 PROCEDURE — 71010 XR CHEST PORT 1 VW: CPT

## 2017-03-30 PROCEDURE — 00000146 ZZHCL STATISTIC GLUCOSE BY METER IP

## 2017-03-30 PROCEDURE — 93005 ELECTROCARDIOGRAM TRACING: CPT

## 2017-03-30 PROCEDURE — 27210437 ZZH NUTRITION PRODUCT SEMIELEM INTERMED LITER

## 2017-03-30 PROCEDURE — 25000128 H RX IP 250 OP 636: Performed by: STUDENT IN AN ORGANIZED HEALTH CARE EDUCATION/TRAINING PROGRAM

## 2017-03-30 PROCEDURE — 40000048 ZZH STATISTIC DAILY SWAN MONITORING

## 2017-03-30 PROCEDURE — 85520 HEPARIN ASSAY: CPT | Performed by: INTERNAL MEDICINE

## 2017-03-30 PROCEDURE — 84132 ASSAY OF SERUM POTASSIUM: CPT | Performed by: INTERNAL MEDICINE

## 2017-03-30 PROCEDURE — 97165 OT EVAL LOW COMPLEX 30 MIN: CPT | Mod: GO

## 2017-03-30 PROCEDURE — 40000275 ZZH STATISTIC RCP TIME EA 10 MIN

## 2017-03-30 PROCEDURE — 84484 ASSAY OF TROPONIN QUANT: CPT | Performed by: INTERNAL MEDICINE

## 2017-03-30 PROCEDURE — 25000128 H RX IP 250 OP 636

## 2017-03-30 PROCEDURE — 40000940 XR CHEST PORT 1 VW

## 2017-03-30 PROCEDURE — 83735 ASSAY OF MAGNESIUM: CPT | Performed by: INTERNAL MEDICINE

## 2017-03-30 PROCEDURE — 82805 BLOOD GASES W/O2 SATURATION: CPT | Performed by: INTERNAL MEDICINE

## 2017-03-30 PROCEDURE — 36569 INSJ PICC 5 YR+ W/O IMAGING: CPT

## 2017-03-30 PROCEDURE — 82330 ASSAY OF CALCIUM: CPT | Performed by: INTERNAL MEDICINE

## 2017-03-30 PROCEDURE — 85730 THROMBOPLASTIN TIME PARTIAL: CPT | Performed by: INTERNAL MEDICINE

## 2017-03-30 PROCEDURE — 93010 ELECTROCARDIOGRAM REPORT: CPT | Performed by: INTERNAL MEDICINE

## 2017-03-30 PROCEDURE — 85610 PROTHROMBIN TIME: CPT | Performed by: INTERNAL MEDICINE

## 2017-03-30 PROCEDURE — 85018 HEMOGLOBIN: CPT | Performed by: INTERNAL MEDICINE

## 2017-03-30 PROCEDURE — 40000076 ZZH STATISTIC IABP MONITORING

## 2017-03-30 PROCEDURE — 25000125 ZZHC RX 250

## 2017-03-30 PROCEDURE — 99292 CRITICAL CARE ADDL 30 MIN: CPT | Mod: GC | Performed by: INTERNAL MEDICINE

## 2017-03-30 PROCEDURE — 27210197 ZZH KIT POWER PICC TRIPLE LUMEN

## 2017-03-30 PROCEDURE — 83605 ASSAY OF LACTIC ACID: CPT | Performed by: INTERNAL MEDICINE

## 2017-03-30 PROCEDURE — 25000132 ZZH RX MED GY IP 250 OP 250 PS 637

## 2017-03-30 PROCEDURE — 94003 VENT MGMT INPAT SUBQ DAY: CPT

## 2017-03-30 PROCEDURE — 71010 XR CHEST PORT 1 VW: CPT | Mod: 77

## 2017-03-30 PROCEDURE — S0171 BUMETANIDE 0.5 MG: HCPCS | Performed by: INTERNAL MEDICINE

## 2017-03-30 PROCEDURE — 99356 ZZC PROLONGED SERV,INPATIENT,1ST HR: CPT | Mod: GC | Performed by: INTERNAL MEDICINE

## 2017-03-30 PROCEDURE — 97110 THERAPEUTIC EXERCISES: CPT | Mod: GO

## 2017-03-30 PROCEDURE — 80048 BASIC METABOLIC PNL TOTAL CA: CPT | Performed by: INTERNAL MEDICINE

## 2017-03-30 PROCEDURE — 20000004 ZZH R&B ICU UMMC

## 2017-03-30 RX ORDER — FUROSEMIDE 10 MG/ML
80 INJECTION INTRAMUSCULAR; INTRAVENOUS ONCE
Status: COMPLETED | OUTPATIENT
Start: 2017-03-30 | End: 2017-03-30

## 2017-03-30 RX ORDER — HYDRALAZINE HYDROCHLORIDE 25 MG/1
25 TABLET, FILM COATED ORAL 4 TIMES DAILY
Status: DISCONTINUED | OUTPATIENT
Start: 2017-03-30 | End: 2017-03-31

## 2017-03-30 RX ORDER — LIDOCAINE 40 MG/G
CREAM TOPICAL
Status: DISCONTINUED | OUTPATIENT
Start: 2017-03-30 | End: 2017-04-12

## 2017-03-30 RX ORDER — HEPARIN SODIUM,PORCINE 10 UNIT/ML
2-5 VIAL (ML) INTRAVENOUS
Status: DISCONTINUED | OUTPATIENT
Start: 2017-03-30 | End: 2017-05-12 | Stop reason: HOSPADM

## 2017-03-30 RX ORDER — AMPICILLIN AND SULBACTAM 2; 1 G/1; G/1
3 INJECTION, POWDER, FOR SOLUTION INTRAMUSCULAR; INTRAVENOUS EVERY 8 HOURS
Status: DISCONTINUED | OUTPATIENT
Start: 2017-03-30 | End: 2017-03-31

## 2017-03-30 RX ORDER — NITROGLYCERIN 20 MG/100ML
.07-2.5 INJECTION INTRAVENOUS CONTINUOUS
Status: DISCONTINUED | OUTPATIENT
Start: 2017-03-30 | End: 2017-04-03

## 2017-03-30 RX ORDER — HEPARIN SODIUM 10000 [USP'U]/100ML
0-3500 INJECTION, SOLUTION INTRAVENOUS CONTINUOUS
Status: DISCONTINUED | OUTPATIENT
Start: 2017-03-30 | End: 2017-03-30

## 2017-03-30 RX ADMIN — FENTANYL CITRATE 25 MCG/HR: 50 INJECTION, SOLUTION INTRAMUSCULAR; INTRAVENOUS at 18:17

## 2017-03-30 RX ADMIN — HYDRALAZINE HYDROCHLORIDE 25 MG: 25 TABLET ORAL at 06:06

## 2017-03-30 RX ADMIN — FENTANYL CITRATE 100 MCG: 50 INJECTION, SOLUTION INTRAMUSCULAR; INTRAVENOUS at 02:25

## 2017-03-30 RX ADMIN — FUROSEMIDE 80 MG: 10 INJECTION, SOLUTION INTRAVENOUS at 09:17

## 2017-03-30 RX ADMIN — SENNOSIDES AND DOCUSATE SODIUM 2 TABLET: 8.6; 5 TABLET ORAL at 19:44

## 2017-03-30 RX ADMIN — SENNOSIDES AND DOCUSATE SODIUM 1 TABLET: 8.6; 5 TABLET ORAL at 07:51

## 2017-03-30 RX ADMIN — HYDROCORTISONE SODIUM SUCCINATE 50 MG: 100 INJECTION, POWDER, FOR SOLUTION INTRAMUSCULAR; INTRAVENOUS at 02:26

## 2017-03-30 RX ADMIN — DOBUTAMINE HYDROCHLORIDE 2.5 MCG/KG/MIN: 200 INJECTION INTRAVENOUS at 18:08

## 2017-03-30 RX ADMIN — CHLOROTHIAZIDE SODIUM 500 MG: 500 INJECTION, POWDER, LYOPHILIZED, FOR SOLUTION INTRAVENOUS at 18:13

## 2017-03-30 RX ADMIN — Medication 2 MG/HR: at 18:07

## 2017-03-30 RX ADMIN — HUMAN INSULIN 5.5 UNITS/HR: 100 INJECTION, SOLUTION SUBCUTANEOUS at 21:06

## 2017-03-30 RX ADMIN — POTASSIUM CHLORIDE 20 MEQ: 1.5 POWDER, FOR SOLUTION ORAL at 04:38

## 2017-03-30 RX ADMIN — AMPICILLIN SODIUM AND SULBACTAM SODIUM 3 G: 2; 1 INJECTION, POWDER, FOR SOLUTION INTRAMUSCULAR; INTRAVENOUS at 18:25

## 2017-03-30 RX ADMIN — HEPARIN SODIUM 750 UNITS/HR: 10000 INJECTION, SOLUTION INTRAVENOUS at 09:26

## 2017-03-30 RX ADMIN — HYDROCORTISONE SODIUM SUCCINATE 50 MG: 100 INJECTION, POWDER, FOR SOLUTION INTRAMUSCULAR; INTRAVENOUS at 14:44

## 2017-03-30 RX ADMIN — VANCOMYCIN HYDROCHLORIDE 1250 MG: 10 INJECTION, POWDER, LYOPHILIZED, FOR SOLUTION INTRAVENOUS at 06:06

## 2017-03-30 RX ADMIN — AMPICILLIN SODIUM AND SULBACTAM SODIUM 3 G: 2; 1 INJECTION, POWDER, FOR SOLUTION INTRAMUSCULAR; INTRAVENOUS at 09:58

## 2017-03-30 RX ADMIN — PANTOPRAZOLE SODIUM 40 MG: 40 INJECTION, POWDER, FOR SOLUTION INTRAVENOUS at 07:50

## 2017-03-30 RX ADMIN — FUROSEMIDE 80 MG: 10 INJECTION, SOLUTION INTRAVENOUS at 02:56

## 2017-03-30 RX ADMIN — POTASSIUM CHLORIDE 20 MEQ: 1.5 POWDER, FOR SOLUTION ORAL at 20:33

## 2017-03-30 RX ADMIN — NITROGLYCERIN 0.07 MCG/KG/MIN: 20 INJECTION INTRAVENOUS at 03:11

## 2017-03-30 RX ADMIN — HYDRALAZINE HYDROCHLORIDE 25 MG: 25 TABLET ORAL at 23:12

## 2017-03-30 RX ADMIN — HYDROCORTISONE SODIUM SUCCINATE 50 MG: 100 INJECTION, POWDER, FOR SOLUTION INTRAMUSCULAR; INTRAVENOUS at 19:44

## 2017-03-30 RX ADMIN — HYDROCORTISONE SODIUM SUCCINATE 50 MG: 100 INJECTION, POWDER, FOR SOLUTION INTRAMUSCULAR; INTRAVENOUS at 08:01

## 2017-03-30 RX ADMIN — PANTOPRAZOLE SODIUM 40 MG: 40 INJECTION, POWDER, FOR SOLUTION INTRAVENOUS at 19:44

## 2017-03-30 RX ADMIN — MULTIVIT AND MINERALS-FERROUS GLUCONATE 9 MG IRON/15 ML ORAL LIQUID 15 ML: at 07:50

## 2017-03-30 RX ADMIN — HYDRALAZINE HYDROCHLORIDE 25 MG: 25 TABLET ORAL at 11:26

## 2017-03-30 RX ADMIN — POTASSIUM CHLORIDE 20 MEQ: 29.8 INJECTION, SOLUTION INTRAVENOUS at 09:07

## 2017-03-30 RX ADMIN — DEXMEDETOMIDINE 0.2 MCG/KG/HR: 100 INJECTION, SOLUTION, CONCENTRATE INTRAVENOUS at 18:31

## 2017-03-30 RX ADMIN — NITROGLYCERIN 1.2 MCG/KG/MIN: 20 INJECTION INTRAVENOUS at 20:06

## 2017-03-30 RX ADMIN — PIPERACILLIN SODIUM,TAZOBACTAM SODIUM 3.38 G: 3; .375 INJECTION, POWDER, FOR SOLUTION INTRAVENOUS at 03:24

## 2017-03-30 RX ADMIN — FENTANYL CITRATE 50 MCG: 50 INJECTION, SOLUTION INTRAMUSCULAR; INTRAVENOUS at 22:25

## 2017-03-30 RX ADMIN — CLOPIDOGREL 75 MG: 75 TABLET, FILM COATED ORAL at 07:50

## 2017-03-30 RX ADMIN — POTASSIUM CHLORIDE 20 MEQ: 1.5 POWDER, FOR SOLUTION ORAL at 23:33

## 2017-03-30 RX ADMIN — ASPIRIN 81 MG CHEWABLE TABLET 81 MG: 81 TABLET CHEWABLE at 07:50

## 2017-03-30 RX ADMIN — HUMAN INSULIN 3 UNITS/HR: 100 INJECTION, SOLUTION SUBCUTANEOUS at 08:30

## 2017-03-30 ASSESSMENT — ACTIVITIES OF DAILY LIVING (ADL)
FALL_HISTORY_WITHIN_LAST_SIX_MONTHS: NO
TRANSFERRING: 4-->COMPLETELY DEPENDENT
SWALLOWING: 2-->DIFFICULTY SWALLOWING LIQUIDS/FOODS
DRESS: 4-->COMPLETELY DEPENDENT
COMMUNICATION: 2-->DIFFICULTY UNDERSTANDING AND SPEAKING (NOT RELATED TO LANGUAGE BARRIER)
WHICH_OF_THE_ABOVE_FUNCTIONAL_RISKS_HAD_A_RECENT_ONSET_OR_CHANGE?: AMBULATION;TRANSFERRING;TOILETING;BATHING;DRESSING;EATING;SWALLOWING;COGNITION;COMMUNICATION/SPEECH
BATHING: 4-->COMPLETELY DEPENDENT
TOILETING: 4-->COMPLETELY DEPENDENT
CURRENT_FUNCTIONAL_LEVEL_COMMENT: SEDATED ON VENT
EATING: 4-->COMPLETELY DEPENDENT
AMBULATION: 4-->COMPLETELY DEPENDENT

## 2017-03-30 NOTE — PROGRESS NOTES
"Cardiology Progress Note    Events and interval changes in past 24 hours:   Started on nitroglycerin drip and PO hydrlazine for high SVR  Sputum growing H.influenza and MSSA    Tm 37.1 HR 90-100s BP cuff MAP 80-100s  IABP 1:1 MAP 80-90s  CMV 12/450/5/40 ABG 7.45/12/39/27 lactate 1.7    CVP 15 MAP 80  PA  PCW  SvO2 52  BSA 1.6 Hgb 9.9   CI 1.9  CO 3.1  SVR 1600  PVR    4am  CVP 15 MAP 80  PA 33/27/30  PCW  SvO2 55% (3am) BSA 1.6 Hgb 9.9  CI 2.1   CO 3.4  SVR 1900  PVR    Drips  Dopamine off  Nitroglycerin drip  Dobutamine 2.5  Epi off  Phenylephrine off  Versed off, fentanyl 50, Precedex 0.2  Insulin drip    Meds  Asa 81  Plavix 75  Hydralazine 25 4 times daily  Hydrocortisone 50mg q6hrs started on 3/28 Day 3  Zosyn,vanco started on 3/28 Day 3  PPI BID  Heparin 5000u BID    CXR: no pulm edema, [place], ETT, IABP, enteric tube in stomach  I/O 3.2/1 since MN 0.7/0.4   Adm wt 139 wt today 137    OBJECTIVE FINDINGS:  /74  Temp 97.7  F (36.5  C) (Axillary)  Resp 12  Ht 1.575 m (5' 2.01\")  Wt 62.2 kg (137 lb 2 oz)  SpO2 98%  BMI 25.07 kg/m2    Gen: intubated  CV: RRR, S1 & S2, systolic murmur in 2nd left ICS, LLB and apex  Lungs: CTAB anteriorly  Abd: slightly distended, soft  Ext: DP pulses not palpable but dopplerable per RN, no peripheral edema          Wt Readings from Last 5 Encounters:   03/30/17 62.2 kg (137 lb 2 oz)   03/27/17 62 kg (136 lb 11 oz)   07/15/08 68.5 kg (151 lb)         Labs and Imaging  Reviewed in epic  Flu swab negative    Echo 3/27  The visual ejection fraction is estimated at 30-35%.  There is extensive inferior, inferoseptal, and inferolateral wall akinesis.  Basal/mid lateral wall hypokinesis  There is moderate to severe septal hypokinesis.  Septal wall motion abnormality may reflect pacemaker activation.  There is a catheter/pacemaker lead seen in the right ventricle.  The right ventricle is mildly dilated.  Severely decreased right ventricular systolic function  There is no " pericardial effusion.  The rhythm was paced.  Compared to the prior study, the LVEF appears somewhat decreased. Septal wall  motion abnormality larger- may reflect, in part, that patient in sinus tach on  prior study, and ventricular paced now. No hemodynamically significant  valvular abnormalities on 2D or color flow imaging    CT chest/abdomen 3/27   IMPRESSION:   1. Small mediastinal hemorrhage, small bilateral pleural effusions  which may be hemorrhagic. Small intraperitoneal hemorrhage. Minimal  pericardial hemorrhage.  2. Minimal pneumoperitoneum in the left paracolic gutter, of unknown  significance. No pneumatosis as clinically questioned.  3. IABP slightly low in position.  4. Bilateral pulmonary dependent consolidations represent compression  atelectasis, however differentials include aspiration.    Cath 3/26 [facility]  SUMMARY:   --Inferior STEMI w/ late presentation. Culprit dictated by complete  heart block, and cardiogenic shock. Emergent echo in the Cath Lab also  shows significant RV dysfunction.  --Three vessel coronary artery disease with diffuse coronary disease  out to the distal vessels  --Successful POBA of the prox and mid-RCA. Stent was not placed, in  anticipation he may need to be considered for bypass surgery in the  near future  --Insertion of a temporary pacemaker for bradycardia (AVB) and  hypotension  --Insertion of a IABP  --Upper GI bleed consistent with [facility] 3/27    CT head 3/28: normal     Echo 3/29  Interpretation Summary  Limited study. Ischemic CM. Moderately (EF 30-35%) reduced left ventricular  function is present. Traced at 32%.  Posterior wall akinesis is present. Lateral wall akinesis is present. Inferior  wall akinesis is present.  Right ventricular function, chamber size, wall motion, and thickness are  normal.  Dilation of the inferior vena cava is present with abnormal respiratory  variation in diameter.     Compared to prior study,  RV fxn is improved.      Plan for today  1) Wean IABP. 1:2 after heparin 10A therapeutic  2) optimize afterload. Titrate nitro for MAP 70-80 close to 70  3) diuresis PRN based on goal CVP<12, urine output etc.  4) continue to wean sedation (on precedex now) to see if he wakes up. Consider Neuro consult if he doesn't wake up  5) Switch to Unasyn (from zosyn and vanco) given thrombocytopenia to cover MSSA, H. Influenza and anaerobes  6) Take left femoral venous line out after PICC is in  7) free water 200cc q4hrs    Card  # Mixed shock: Cardiogenic from biventricular failure from inferior STEMI and distributive shock from post-MI SIRS response vs aspiration pneumonia/pneumonitis from possible aspiration during cath at [facility] on 3/26  # Due to inferior wall STEMI. S/P 7 stents to LAD, circ, RCA (angiogram report pending). Now with IABP, temporary pacemaker after 3rd deg heart block in cath lab at [facility] on 3/26   # Small pericardial hemorrhage  - Continue IABP for coronary perfusion  -temp pacer removed 3/29  - Dual antiplatelet therapy with ASA 81mg and plavix 75mg qday  - Will adjust plan according to hemodynamics and further test results.      # Neuro  -Slow to wake up- likely from sedation in setting of shock liver,   Sedation versed gtt + PRNs  Analgesia fentanyl gtt + PRNs  Daily sedation holidays once stabilized        Respiratory  #Intubated for airway protection due to mental status and emesis on 3/26.   #Probable Aspiration PNA/pneumonitis  #Small mediastinal hemorrhage       # ID  . Sepsis from aspiration pneumonia/pneumonitis vs MRSA pneumonia vs post-MI SIRS response  - repeat cultures if febrile  - Unasyn  - Hydrocortisone 50mg q6hr for BP support     # Endocrine  T2DM: HA1C 7.5 yesterday on admission. BG climibing to 170s since admission. Will likely continue to rise with steroids.   - Insulin gtt per nurising protocol with hypoglycemia precautions.      # GI  Minimal pneumoperitoneum,  unclear etiology: Seen by General surgery.   -Conservative Mx for now    Shock liver injury with coagulopathy-improving trend LFTs,   -Vit K 2.5mg 3/28       # Renal/  Acute kidney injury-nonoliguric: multifactorial: hypoperfusion, contrast etc.  - Daily Cr  - Avoid nephrotoxins as able  - [place] placed 3/26    -Hypernatremia- free water    #Hem  -thrombocytopenia-likely from balloon pump  -Anemia- multifactorial     FEN: feeding tube  Code: FULL  PPx:  subq heparin, PPI 40mg IV BID, senna PRN  Dispo: pending stability    Will staff with Dr. Talon Park  General Cardiology fellow, PGY4  Pager [phone]

## 2017-03-30 NOTE — PROGRESS NOTES
"[facility]   Antimicrobial Management Team (AMT) Note            To: Maria M II  Unit: 4E  Allergies:  no known allergies  Brief Summary: Luke Henao is a 51 year old male smoker male with diabetes who presented to the on 03/26 with a STEMI. Patient s primary language is [place]; patient speaks some English. Family informed EMS that the patient began having vomiting and \"flu-like\" symptoms two days ago along with back pain between his shoulder blades. He had a blood sugar of 201, blood pressure of 88/57, and was bradycardic for paramedics; patient was in cardiac shock, was intubated, and transported to MICU.    Interval History:  03/26 STEMI confirmed; cardiac shock, intubation  03/29 Feeding tube placed for nutrition support, as pt still intubated    Assessment:  Aspiration Pneumonia; possible sepsis vs post-MI SIRS response:    WBC high upon admit; peaked on 03/27 (19.3), and continue to be high throughout admit. For 2 days prior to admit, patient s family reported he began vomiting, increasing risk for aspiration pneumonia. Also, concern for possible aspiration during a cath at [facility] on 03/26. Patient s last fever was 101.8 on 03/28; patient has been afebrile since then. WBC continue to be high, but have trended down slightly to 15.5 this morning. Lactate elevated on 03/26 (5.3), spiked  on 03/27 (17), and have since trended down to 1.8 this morning (03/30). Elevated lactic acid levels could be secondary to STEMI and associated cardiac shock/tissue hypoperfusion. Procalcitonin level was not drawn. Sputum culture drawn on 03/28 was positive for heavy growth of Staph aureus, Haemophilus influenza, and mild growth of normal brianda. Patient not at risk for MRSA; nares came back negative for MRSA and sputum culture sensitivity indicates MSSA (susceptible to oxacillin).     Patient was experiencing some signs of sepsis, including elevated LA levels, hypotension, " leukocytosis, kidney hypoperfusion, indicated possible sepsis vs. post STEMI SIRS response. Blood cultures have been negative.     Recommendation/Interventions:   1). Agree with de-escalation of pip/tazo + vancomycin to Unasyn.. Suggest 4 days of Unasyn therapy to complete 7 day total course of antibiotics, pending clinical course.      Discussed w/ ID Staff-Dr. Lg Benoit    Current Antibiotic therapy:  Unasyn 3 g Q8H    Previous Antibiotic therapy:  Zosyn 4.5 g Q6H; start  at 2251, 6 doses, then dose reduced   Zosyn 3.375 g Q6H; start 3/29 at 1000 (dose adjustment)  Vanco 1500 mg load, followed by 1250 mg Q18H    Vital Signs and other clinical features:      Temperature          Imagin/27 Chest X-ray: 3. Increased left retrocardiac airspace opacities, likely atelectatic.    Culture Results:  Date Culture Site Organism    Nares MRSA negative     Blood left arm  No growth    Blood left hand No growth    Blood right arm No growth     0057 Sputum, endotracheal Heavy growth staph aureus, heavy growth haemophilus influenza, light growth normal brianda; gram stain contained mixed gram + and gram - organisms. Susceptible to cprio, clinda, erythro, gent, levo, ox     195 Blood right arm      Sputum endotracheal  Gram stain: gram positive cocci

## 2017-03-30 NOTE — PLAN OF CARE
Problem: Goal Outcome Summary  Goal: Goal Outcome Summary  OT 4E: OT Evaluation completed. Pt intubated/sedated and not following commands. Pt tolerated PROM to BUEs/LLE to maintain ROM and joint integrity for ADLs. No contractures noted at this time however decreased ROM in B hands due to increased swelling. Pt's  O2 >95% RR 13 on CMV 40% FiO2 PEEP 5. Pt continues to benefit from skilled OT Intervention to progress pt to PLOF.     Anticipate pt will require rehab when medically stable due to prolonged bedrest and at risk for deconditioning. Will continue to assess and update as able.

## 2017-03-31 ENCOUNTER — APPOINTMENT (OUTPATIENT)
Dept: GENERAL RADIOLOGY | Facility: CLINIC | Age: 52
DRG: 228 | End: 2017-03-31
Attending: INTERNAL MEDICINE
Payer: COMMERCIAL

## 2017-03-31 ENCOUNTER — APPOINTMENT (OUTPATIENT)
Dept: CT IMAGING | Facility: CLINIC | Age: 52
DRG: 228 | End: 2017-03-31
Attending: INTERNAL MEDICINE
Payer: COMMERCIAL

## 2017-03-31 ENCOUNTER — APPOINTMENT (OUTPATIENT)
Dept: CARDIOLOGY | Facility: CLINIC | Age: 52
DRG: 228 | End: 2017-03-31
Attending: INTERNAL MEDICINE
Payer: COMMERCIAL

## 2017-03-31 LAB
ALBUMIN SERPL-MCNC: 2.4 G/DL (ref 3.4–5)
ALBUMIN UR-MCNC: NEGATIVE MG/DL
ALP SERPL-CCNC: 65 U/L (ref 40–150)
ALT SERPL W P-5'-P-CCNC: 2898 U/L (ref 0–70)
ANION GAP SERPL CALCULATED.3IONS-SCNC: 11 MMOL/L (ref 3–14)
ANION GAP SERPL CALCULATED.3IONS-SCNC: 12 MMOL/L (ref 3–14)
APPEARANCE UR: CLEAR
APTT PPP: 34 SEC (ref 22–37)
AST SERPL W P-5'-P-CCNC: 1233 U/L (ref 0–45)
BACTERIA SPEC CULT: ABNORMAL
BASE EXCESS BLDV CALC-SCNC: 3.3 MMOL/L
BASE EXCESS BLDV CALC-SCNC: 5.5 MMOL/L
BASE EXCESS BLDV CALC-SCNC: 7.4 MMOL/L
BASE EXCESS BLDV CALC-SCNC: 8.9 MMOL/L
BASE EXCESS BLDV CALC-SCNC: 9.8 MMOL/L
BILIRUB DIRECT SERPL-MCNC: 0.4 MG/DL (ref 0–0.2)
BILIRUB SERPL-MCNC: 0.8 MG/DL (ref 0.2–1.3)
BILIRUB UR QL STRIP: NEGATIVE
BUN SERPL-MCNC: 63 MG/DL (ref 7–30)
BUN SERPL-MCNC: 65 MG/DL (ref 7–30)
CA-I BLD-MCNC: 4.1 MG/DL (ref 4.4–5.2)
CALCIUM SERPL-MCNC: 7.1 MG/DL (ref 8.5–10.1)
CALCIUM SERPL-MCNC: 7.7 MG/DL (ref 8.5–10.1)
CHLORIDE SERPL-SCNC: 102 MMOL/L (ref 94–109)
CHLORIDE SERPL-SCNC: 107 MMOL/L (ref 94–109)
CO2 SERPL-SCNC: 28 MMOL/L (ref 20–32)
CO2 SERPL-SCNC: 30 MMOL/L (ref 20–32)
COLOR UR AUTO: ABNORMAL
CREAT SERPL-MCNC: 3.49 MG/DL (ref 0.66–1.25)
CREAT SERPL-MCNC: 3.56 MG/DL (ref 0.66–1.25)
ERYTHROCYTE [DISTWIDTH] IN BLOOD BY AUTOMATED COUNT: 13.8 % (ref 10–15)
ERYTHROCYTE [DISTWIDTH] IN BLOOD BY AUTOMATED COUNT: 13.9 % (ref 10–15)
GFR SERPL CREATININE-BSD FRML MDRD: 18 ML/MIN/1.7M2
GFR SERPL CREATININE-BSD FRML MDRD: 19 ML/MIN/1.7M2
GLUCOSE BLDC GLUCOMTR-MCNC: 100 MG/DL (ref 70–99)
GLUCOSE BLDC GLUCOMTR-MCNC: 107 MG/DL (ref 70–99)
GLUCOSE BLDC GLUCOMTR-MCNC: 109 MG/DL (ref 70–99)
GLUCOSE BLDC GLUCOMTR-MCNC: 110 MG/DL (ref 70–99)
GLUCOSE BLDC GLUCOMTR-MCNC: 114 MG/DL (ref 70–99)
GLUCOSE BLDC GLUCOMTR-MCNC: 118 MG/DL (ref 70–99)
GLUCOSE BLDC GLUCOMTR-MCNC: 118 MG/DL (ref 70–99)
GLUCOSE BLDC GLUCOMTR-MCNC: 122 MG/DL (ref 70–99)
GLUCOSE BLDC GLUCOMTR-MCNC: 129 MG/DL (ref 70–99)
GLUCOSE BLDC GLUCOMTR-MCNC: 132 MG/DL (ref 70–99)
GLUCOSE BLDC GLUCOMTR-MCNC: 133 MG/DL (ref 70–99)
GLUCOSE BLDC GLUCOMTR-MCNC: 138 MG/DL (ref 70–99)
GLUCOSE BLDC GLUCOMTR-MCNC: 144 MG/DL (ref 70–99)
GLUCOSE BLDC GLUCOMTR-MCNC: 146 MG/DL (ref 70–99)
GLUCOSE BLDC GLUCOMTR-MCNC: 149 MG/DL (ref 70–99)
GLUCOSE BLDC GLUCOMTR-MCNC: 153 MG/DL (ref 70–99)
GLUCOSE BLDC GLUCOMTR-MCNC: 158 MG/DL (ref 70–99)
GLUCOSE BLDC GLUCOMTR-MCNC: 161 MG/DL (ref 70–99)
GLUCOSE BLDC GLUCOMTR-MCNC: 165 MG/DL (ref 70–99)
GLUCOSE BLDC GLUCOMTR-MCNC: 177 MG/DL (ref 70–99)
GLUCOSE BLDC GLUCOMTR-MCNC: 180 MG/DL (ref 70–99)
GLUCOSE SERPL-MCNC: 125 MG/DL (ref 70–99)
GLUCOSE SERPL-MCNC: 127 MG/DL (ref 70–99)
GLUCOSE UR STRIP-MCNC: NEGATIVE MG/DL
HCO3 BLDV-SCNC: 28 MMOL/L (ref 21–28)
HCO3 BLDV-SCNC: 30 MMOL/L (ref 21–28)
HCO3 BLDV-SCNC: 32 MMOL/L (ref 21–28)
HCO3 BLDV-SCNC: 33 MMOL/L (ref 21–28)
HCO3 BLDV-SCNC: 34 MMOL/L (ref 21–28)
HCT VFR BLD AUTO: 27 % (ref 40–53)
HCT VFR BLD AUTO: 27.2 % (ref 40–53)
HGB BLD-MCNC: 8.9 G/DL (ref 13.3–17.7)
HGB BLD-MCNC: 9 G/DL (ref 13.3–17.7)
HGB UR QL STRIP: NEGATIVE
HYALINE CASTS #/AREA URNS LPF: 1 /LPF (ref 0–2)
INR PPP: 1.48 (ref 0.86–1.14)
KETONES UR STRIP-MCNC: NEGATIVE MG/DL
LACTATE BLD-SCNC: 1.3 MMOL/L (ref 0.7–2.1)
LACTATE BLD-SCNC: 1.6 MMOL/L (ref 0.7–2.1)
LACTATE BLD-SCNC: 1.7 MMOL/L (ref 0.7–2.1)
LACTATE BLD-SCNC: 1.8 MMOL/L (ref 0.7–2.1)
LACTATE BLD-SCNC: 2 MMOL/L (ref 0.7–2.1)
LEUKOCYTE ESTERASE UR QL STRIP: ABNORMAL
LMWH PPP CHRO-ACNC: NORMAL IU/ML
MAGNESIUM SERPL-MCNC: 2.2 MG/DL (ref 1.6–2.3)
MAGNESIUM SERPL-MCNC: 2.3 MG/DL (ref 1.6–2.3)
MAGNESIUM SERPL-MCNC: 2.3 MG/DL (ref 1.6–2.3)
MCH RBC QN AUTO: 30.2 PG (ref 26.5–33)
MCH RBC QN AUTO: 30.3 PG (ref 26.5–33)
MCHC RBC AUTO-ENTMCNC: 33 G/DL (ref 31.5–36.5)
MCHC RBC AUTO-ENTMCNC: 33.1 G/DL (ref 31.5–36.5)
MCV RBC AUTO: 92 FL (ref 78–100)
MCV RBC AUTO: 92 FL (ref 78–100)
MICRO REPORT STATUS: ABNORMAL
MUCOUS THREADS #/AREA URNS LPF: PRESENT /LPF
NITRATE UR QL: NEGATIVE
O2/TOTAL GAS SETTING VFR VENT: 40 %
OXYHGB MFR BLDV: 49 %
OXYHGB MFR BLDV: 52 %
OXYHGB MFR BLDV: 54 %
OXYHGB MFR BLDV: 56 %
OXYHGB MFR BLDV: 58 %
PCO2 BLDV: 42 MM HG (ref 40–50)
PCO2 BLDV: 43 MM HG (ref 40–50)
PCO2 BLDV: 43 MM HG (ref 40–50)
PCO2 BLDV: 44 MM HG (ref 40–50)
PCO2 BLDV: 44 MM HG (ref 40–50)
PH BLDV: 7.43 PH (ref 7.32–7.43)
PH BLDV: 7.45 PH (ref 7.32–7.43)
PH BLDV: 7.48 PH (ref 7.32–7.43)
PH BLDV: 7.49 PH (ref 7.32–7.43)
PH BLDV: 7.49 PH (ref 7.32–7.43)
PH UR STRIP: 5 PH (ref 5–7)
PHOSPHATE SERPL-MCNC: 4.1 MG/DL (ref 2.5–4.5)
PHOSPHATE SERPL-MCNC: 4.1 MG/DL (ref 2.5–4.5)
PLATELET # BLD AUTO: 118 10E9/L (ref 150–450)
PLATELET # BLD AUTO: 91 10E9/L (ref 150–450)
PO2 BLDV: 28 MM HG (ref 25–47)
PO2 BLDV: 30 MM HG (ref 25–47)
PO2 BLDV: 31 MM HG (ref 25–47)
PO2 BLDV: 31 MM HG (ref 25–47)
PO2 BLDV: 32 MM HG (ref 25–47)
POTASSIUM BLD-SCNC: 2.9 MMOL/L (ref 3.4–5.3)
POTASSIUM BLD-SCNC: 3 MMOL/L (ref 3.4–5.3)
POTASSIUM BLD-SCNC: 3 MMOL/L (ref 3.4–5.3)
POTASSIUM BLD-SCNC: 3.1 MMOL/L (ref 3.4–5.3)
POTASSIUM BLD-SCNC: 3.6 MMOL/L (ref 3.4–5.3)
POTASSIUM SERPL-SCNC: 3 MMOL/L (ref 3.4–5.3)
POTASSIUM SERPL-SCNC: 3 MMOL/L (ref 3.4–5.3)
POTASSIUM SERPL-SCNC: 3.7 MMOL/L (ref 3.4–5.3)
POTASSIUM SERPL-SCNC: 3.8 MMOL/L (ref 3.4–5.3)
PROT SERPL-MCNC: 5.6 G/DL (ref 6.8–8.8)
RBC # BLD AUTO: 2.95 10E12/L (ref 4.4–5.9)
RBC # BLD AUTO: 2.97 10E12/L (ref 4.4–5.9)
RBC #/AREA URNS AUTO: 1 /HPF (ref 0–2)
SODIUM SERPL-SCNC: 145 MMOL/L (ref 133–144)
SODIUM SERPL-SCNC: 147 MMOL/L (ref 133–144)
SP GR UR STRIP: 1.01 (ref 1–1.03)
SPECIMEN SOURCE: ABNORMAL
TROPONIN I SERPL-MCNC: 65.48 UG/L (ref 0–0.04)
TROPONIN I SERPL-MCNC: 73.18 UG/L (ref 0–0.04)
TROPONIN I SERPL-MCNC: ABNORMAL UG/L (ref 0–0.04)
URN SPEC COLLECT METH UR: ABNORMAL
UROBILINOGEN UR STRIP-MCNC: NORMAL MG/DL (ref 0–2)
VANCOMYCIN SERPL-MCNC: 29.1 MG/L
WBC # BLD AUTO: 14.5 10E9/L (ref 4–11)
WBC # BLD AUTO: 14.9 10E9/L (ref 4–11)
WBC #/AREA URNS AUTO: 2 /HPF (ref 0–2)

## 2017-03-31 PROCEDURE — 82805 BLOOD GASES W/O2 SATURATION: CPT | Performed by: INTERNAL MEDICINE

## 2017-03-31 PROCEDURE — 25000132 ZZH RX MED GY IP 250 OP 250 PS 637: Performed by: INTERNAL MEDICINE

## 2017-03-31 PROCEDURE — 25000125 ZZHC RX 250: Performed by: INTERNAL MEDICINE

## 2017-03-31 PROCEDURE — S0171 BUMETANIDE 0.5 MG: HCPCS | Performed by: INTERNAL MEDICINE

## 2017-03-31 PROCEDURE — 00000146 ZZHCL STATISTIC GLUCOSE BY METER IP

## 2017-03-31 PROCEDURE — 85520 HEPARIN ASSAY: CPT | Performed by: INTERNAL MEDICINE

## 2017-03-31 PROCEDURE — 40000275 ZZH STATISTIC RCP TIME EA 10 MIN

## 2017-03-31 PROCEDURE — 83735 ASSAY OF MAGNESIUM: CPT | Performed by: INTERNAL MEDICINE

## 2017-03-31 PROCEDURE — 25000128 H RX IP 250 OP 636: Performed by: INTERNAL MEDICINE

## 2017-03-31 PROCEDURE — 84100 ASSAY OF PHOSPHORUS: CPT | Performed by: INTERNAL MEDICINE

## 2017-03-31 PROCEDURE — 85610 PROTHROMBIN TIME: CPT | Performed by: INTERNAL MEDICINE

## 2017-03-31 PROCEDURE — 71010 XR CHEST PORT 1 VW: CPT

## 2017-03-31 PROCEDURE — 82330 ASSAY OF CALCIUM: CPT | Performed by: INTERNAL MEDICINE

## 2017-03-31 PROCEDURE — 20000004 ZZH R&B ICU UMMC

## 2017-03-31 PROCEDURE — 70450 CT HEAD/BRAIN W/O DYE: CPT

## 2017-03-31 PROCEDURE — 80048 BASIC METABOLIC PNL TOTAL CA: CPT | Performed by: INTERNAL MEDICINE

## 2017-03-31 PROCEDURE — 93308 TTE F-UP OR LMTD: CPT

## 2017-03-31 PROCEDURE — 40000196 ZZH STATISTIC RAPCV CVP MONITORING

## 2017-03-31 PROCEDURE — 85027 COMPLETE CBC AUTOMATED: CPT | Performed by: INTERNAL MEDICINE

## 2017-03-31 PROCEDURE — 83605 ASSAY OF LACTIC ACID: CPT | Performed by: INTERNAL MEDICINE

## 2017-03-31 PROCEDURE — 25500064 ZZH RX 255 OP 636: Performed by: INTERNAL MEDICINE

## 2017-03-31 PROCEDURE — 85730 THROMBOPLASTIN TIME PARTIAL: CPT | Performed by: INTERNAL MEDICINE

## 2017-03-31 PROCEDURE — 25000125 ZZHC RX 250: Performed by: STUDENT IN AN ORGANIZED HEALTH CARE EDUCATION/TRAINING PROGRAM

## 2017-03-31 PROCEDURE — 82248 BILIRUBIN DIRECT: CPT | Performed by: INTERNAL MEDICINE

## 2017-03-31 PROCEDURE — 25000132 ZZH RX MED GY IP 250 OP 250 PS 637

## 2017-03-31 PROCEDURE — 84132 ASSAY OF SERUM POTASSIUM: CPT | Performed by: INTERNAL MEDICINE

## 2017-03-31 PROCEDURE — 27210437 ZZH NUTRITION PRODUCT SEMIELEM INTERMED LITER

## 2017-03-31 PROCEDURE — 40000281 ZZH STATISTIC TRANSPORT TIME EA 15 MIN

## 2017-03-31 PROCEDURE — 84484 ASSAY OF TROPONIN QUANT: CPT | Performed by: INTERNAL MEDICINE

## 2017-03-31 PROCEDURE — 40000264 ECHO LIMITED WITH OPTISON

## 2017-03-31 PROCEDURE — 93010 ELECTROCARDIOGRAM REPORT: CPT | Mod: 76 | Performed by: INTERNAL MEDICINE

## 2017-03-31 PROCEDURE — 81001 URINALYSIS AUTO W/SCOPE: CPT | Performed by: INTERNAL MEDICINE

## 2017-03-31 PROCEDURE — 94645 CONT INHLJ TX EACH ADDL HOUR: CPT

## 2017-03-31 PROCEDURE — 40000048 ZZH STATISTIC DAILY SWAN MONITORING

## 2017-03-31 PROCEDURE — 93321 DOPPLER ECHO F-UP/LMTD STD: CPT | Mod: 26 | Performed by: INTERNAL MEDICINE

## 2017-03-31 PROCEDURE — 93308 TTE F-UP OR LMTD: CPT | Mod: 26 | Performed by: INTERNAL MEDICINE

## 2017-03-31 PROCEDURE — 93005 ELECTROCARDIOGRAM TRACING: CPT

## 2017-03-31 PROCEDURE — 80202 ASSAY OF VANCOMYCIN: CPT | Performed by: INTERNAL MEDICINE

## 2017-03-31 PROCEDURE — 40000076 ZZH STATISTIC IABP MONITORING

## 2017-03-31 PROCEDURE — 80053 COMPREHEN METABOLIC PANEL: CPT | Performed by: INTERNAL MEDICINE

## 2017-03-31 PROCEDURE — 40000014 ZZH STATISTIC ARTERIAL MONITORING DAILY

## 2017-03-31 PROCEDURE — 99291 CRITICAL CARE FIRST HOUR: CPT | Mod: 25 | Performed by: INTERNAL MEDICINE

## 2017-03-31 PROCEDURE — 93325 DOPPLER ECHO COLOR FLOW MAPG: CPT | Mod: 26 | Performed by: INTERNAL MEDICINE

## 2017-03-31 PROCEDURE — 94003 VENT MGMT INPAT SUBQ DAY: CPT

## 2017-03-31 RX ORDER — AMPICILLIN AND SULBACTAM 2; 1 G/1; G/1
3 INJECTION, POWDER, FOR SOLUTION INTRAMUSCULAR; INTRAVENOUS EVERY 8 HOURS
Status: DISCONTINUED | OUTPATIENT
Start: 2017-03-31 | End: 2017-04-01

## 2017-03-31 RX ORDER — HEPARIN SODIUM 10000 [USP'U]/100ML
500 INJECTION, SOLUTION INTRAVENOUS CONTINUOUS
Status: DISCONTINUED | OUTPATIENT
Start: 2017-03-31 | End: 2017-04-02

## 2017-03-31 RX ORDER — LIDOCAINE 40 MG/G
CREAM TOPICAL
Status: DISCONTINUED | OUTPATIENT
Start: 2017-03-31 | End: 2017-04-01

## 2017-03-31 RX ORDER — POTASSIUM CHLORIDE 1.5 G/1.58G
60 POWDER, FOR SOLUTION ORAL ONCE
Status: COMPLETED | OUTPATIENT
Start: 2017-03-31 | End: 2017-03-31

## 2017-03-31 RX ORDER — HEPARIN SODIUM 10000 [USP'U]/100ML
500 INJECTION, SOLUTION INTRAVENOUS CONTINUOUS
Status: DISCONTINUED | OUTPATIENT
Start: 2017-03-31 | End: 2017-03-31

## 2017-03-31 RX ORDER — HEPARIN SODIUM 10000 [USP'U]/100ML
0-3500 INJECTION, SOLUTION INTRAVENOUS CONTINUOUS
Status: DISCONTINUED | OUTPATIENT
Start: 2017-03-31 | End: 2017-03-31

## 2017-03-31 RX ORDER — CHLOROTHIAZIDE SODIUM 500 MG/1
500 INJECTION INTRAVENOUS ONCE
Status: COMPLETED | OUTPATIENT
Start: 2017-03-31 | End: 2017-03-31

## 2017-03-31 RX ORDER — ATORVASTATIN CALCIUM 40 MG/1
40 TABLET, FILM COATED ORAL DAILY
Status: DISCONTINUED | OUTPATIENT
Start: 2017-03-31 | End: 2017-03-31

## 2017-03-31 RX ADMIN — FENTANYL CITRATE 50 MCG: 50 INJECTION, SOLUTION INTRAMUSCULAR; INTRAVENOUS at 16:19

## 2017-03-31 RX ADMIN — HUMAN INSULIN 2 UNITS/HR: 100 INJECTION, SOLUTION SUBCUTANEOUS at 04:47

## 2017-03-31 RX ADMIN — AMPICILLIN SODIUM AND SULBACTAM SODIUM 3 G: 2; 1 INJECTION, POWDER, FOR SOLUTION INTRAMUSCULAR; INTRAVENOUS at 18:27

## 2017-03-31 RX ADMIN — POTASSIUM CHLORIDE 60 MEQ: 1.5 POWDER, FOR SOLUTION ORAL at 16:58

## 2017-03-31 RX ADMIN — POTASSIUM CHLORIDE 40 MEQ: 1.5 POWDER, FOR SOLUTION ORAL at 20:30

## 2017-03-31 RX ADMIN — NITROGLYCERIN 1.5 MCG/KG/MIN: 20 INJECTION INTRAVENOUS at 15:56

## 2017-03-31 RX ADMIN — POTASSIUM CHLORIDE 20 MEQ: 1.5 POWDER, FOR SOLUTION ORAL at 04:45

## 2017-03-31 RX ADMIN — PANTOPRAZOLE SODIUM 40 MG: 40 INJECTION, POWDER, FOR SOLUTION INTRAVENOUS at 07:59

## 2017-03-31 RX ADMIN — MULTIVIT AND MINERALS-FERROUS GLUCONATE 9 MG IRON/15 ML ORAL LIQUID 15 ML: at 07:59

## 2017-03-31 RX ADMIN — CHLOROTHIAZIDE SODIUM 500 MG: 500 INJECTION, POWDER, LYOPHILIZED, FOR SOLUTION INTRAVENOUS at 10:44

## 2017-03-31 RX ADMIN — HUMAN INSULIN 4 UNITS/HR: 100 INJECTION, SOLUTION SUBCUTANEOUS at 11:33

## 2017-03-31 RX ADMIN — HYDROCORTISONE SODIUM SUCCINATE 50 MG: 100 INJECTION, POWDER, FOR SOLUTION INTRAMUSCULAR; INTRAVENOUS at 07:59

## 2017-03-31 RX ADMIN — HUMAN ALBUMIN MICROSPHERES AND PERFLUTREN 6 ML: 10; .22 INJECTION, SOLUTION INTRAVENOUS at 11:45

## 2017-03-31 RX ADMIN — AMPICILLIN SODIUM AND SULBACTAM SODIUM 3 G: 2; 1 INJECTION, POWDER, FOR SOLUTION INTRAMUSCULAR; INTRAVENOUS at 02:06

## 2017-03-31 RX ADMIN — SENNOSIDES AND DOCUSATE SODIUM 2 TABLET: 8.6; 5 TABLET ORAL at 20:32

## 2017-03-31 RX ADMIN — HYDROCORTISONE SODIUM SUCCINATE 50 MG: 100 INJECTION, POWDER, FOR SOLUTION INTRAMUSCULAR; INTRAVENOUS at 02:06

## 2017-03-31 RX ADMIN — FENTANYL CITRATE 75 MCG: 50 INJECTION, SOLUTION INTRAMUSCULAR; INTRAVENOUS at 22:52

## 2017-03-31 RX ADMIN — PANTOPRAZOLE SODIUM 40 MG: 40 TABLET, DELAYED RELEASE ORAL at 19:31

## 2017-03-31 RX ADMIN — HYDRALAZINE HYDROCHLORIDE 25 MG: 25 TABLET ORAL at 05:31

## 2017-03-31 RX ADMIN — Medication 2 MG/HR: at 13:04

## 2017-03-31 RX ADMIN — NITROGLYCERIN 1.75 MCG/KG/MIN: 20 INJECTION INTRAVENOUS at 04:45

## 2017-03-31 RX ADMIN — Medication 2 MG/HR: at 02:19

## 2017-03-31 RX ADMIN — HYDROCORTISONE SODIUM SUCCINATE 50 MG: 100 INJECTION, POWDER, FOR SOLUTION INTRAMUSCULAR; INTRAVENOUS at 13:44

## 2017-03-31 RX ADMIN — POTASSIUM CHLORIDE 40 MEQ: 1.5 POWDER, FOR SOLUTION ORAL at 19:33

## 2017-03-31 RX ADMIN — POTASSIUM CHLORIDE 40 MEQ: 1.5 POWDER, FOR SOLUTION ORAL at 13:34

## 2017-03-31 RX ADMIN — ASPIRIN 81 MG CHEWABLE TABLET 81 MG: 81 TABLET CHEWABLE at 07:59

## 2017-03-31 RX ADMIN — FENTANYL CITRATE 75 MCG: 50 INJECTION, SOLUTION INTRAMUSCULAR; INTRAVENOUS at 20:29

## 2017-03-31 RX ADMIN — CLOPIDOGREL 75 MG: 75 TABLET, FILM COATED ORAL at 07:59

## 2017-03-31 RX ADMIN — AMPICILLIN SODIUM AND SULBACTAM SODIUM 3 G: 2; 1 INJECTION, POWDER, FOR SOLUTION INTRAMUSCULAR; INTRAVENOUS at 09:59

## 2017-03-31 RX ADMIN — CALCIUM GLUCONATE 2 G: 94 INJECTION, SOLUTION INTRAVENOUS at 08:38

## 2017-03-31 RX ADMIN — FENTANYL CITRATE 25 MCG: 50 INJECTION, SOLUTION INTRAMUSCULAR; INTRAVENOUS at 02:12

## 2017-03-31 RX ADMIN — POTASSIUM CHLORIDE 40 MEQ: 1.5 POWDER, FOR SOLUTION ORAL at 08:42

## 2017-03-31 RX ADMIN — FENTANYL CITRATE 75 MCG: 50 INJECTION, SOLUTION INTRAMUSCULAR; INTRAVENOUS at 01:09

## 2017-03-31 RX ADMIN — HEPARIN SODIUM 500 UNITS/HR: 10000 INJECTION, SOLUTION INTRAVENOUS at 22:04

## 2017-03-31 RX ADMIN — HUMAN INSULIN 2 UNITS/HR: 100 INJECTION, SOLUTION SUBCUTANEOUS at 21:34

## 2017-03-31 RX ADMIN — FENTANYL CITRATE 50 MCG/HR: 50 INJECTION, SOLUTION INTRAMUSCULAR; INTRAVENOUS at 16:39

## 2017-03-31 NOTE — PROGRESS NOTES
Social Work Services Progress Note    Hospital Day: 5  Date of Initial Social Work Evaluation:  3/28/17  Collaborated with:  Patient's children, team rounds, chart review    Data:    Pt remains in ICU for continued medical care.  Neurology consult occurring today.    Intervention:    Received call from pt's dtr Camtu requesting medical letters for her siblings.  Met with Camtu along with her brothers Pancho and Vikki, and sister Mony.  Per request, provided them with letters for work and school    Assessment:  Pt's children appear to be coping appropriately and voice appreciation of assistance.    Plan:    Anticipated Disposition:  TBD pending medical course    Barriers to d/c plan:  Medical stability    Follow Up:  SW continues to follow for support to pt and family, resource referral, and d/c planning.    LUIS Villafana, [facility]  Adult ICU Clinical   Pager [phone]

## 2017-03-31 NOTE — PLAN OF CARE
Problem: Individualization  Goal: Patient Preferences  Outcome: Improving  Plan to go to cath lab this am due to EKG changes however ECHO done at bedside and EF improving and trop trending down; per MD no need to cath. CT head done, no changes. EEG ordered but canceled when pt woke up and nodded to questions and attempted to follow commands. Tube feeds on hold from 3918-9614 in anticipation for cath lab, now running at 40cc/h goal is 50cc/h. Bumex gtt continued and 5 mg bolus plus 500 mg diuril given this am. Replacing K per protocol plus extra 20 meq ordered by MD. Nitroglycerin gtt running to keep MAP 70-80. Family at bedside and updated by MD and RN. Starting heparin to wean IABP and plan for possible IABP DC tomorrow.     Update: Latest troponin back up to 73, MD notified. Orders to hold heparin gtt and hold tube feeding for now and await further orders.

## 2017-03-31 NOTE — PROGRESS NOTES
"Cardiology Progress Note    Events and interval changes in past 24 hours:   Yesterday was on Dobutamine 2.5. IABP switched to 1:2 around 2pm. Pt became hypotensive ~5pm. CVP 20 PCW 22 UO declining. IABP put back to 1:1. Dopamine restarted, nitroglycerin turned off. Bumex 5mg bolus, 2mg/hr bolus and IV cholorothiazide 500mg  However then MAP 100s, nitroglycerin restarted, Dopamine stopped  Troponin 84-89-85  ECG this AM persistent anterolateral ST depressions. Hyptoensive episode based on cuff BP. Corresponding IABP mean not recorded at the time    Tm 37.6 HR 90-100s   IABP 1:1 MAP 70-80s  CMV 12/450/5/40 VBG 7.45/43/28/30 lactate 2    4am  CVP 15 MAP 70  PA 33/22  PCW  SvO2 49  BSA 1.6 Hgb 9.0   CI 2.1  CO 3.4  SVR 1250  PVR      Drips  Bumex 2  Nitroglycerin drip 0.5 mcg/kg/min  Dobutamine 2.5  Dopamine off  Epi off  Phenylephrine off    Versed off, fentanyl 50, Precedex 0.2  Insulin drip    Meds  Asa 81  Plavix 75  Hydrocortisone 50mg q6hrs started on 3/28 Day 3  Zosyn,vanco started on 3/28 Day 3  PPI BID  Heparin drip for IABP    CXR: no pulm edema, [place], ETT, IABP, enteric tube in stomach  I/O 3.2/2.1 since MN 1.1/1.5   Adm wt 139 wt today 144 yesterday 137    OBJECTIVE FINDINGS:  BP 92/71  Temp 98.5  F (36.9  C) (Oral)  Resp 16  Ht 1.575 m (5' 2.01\")  Wt 65.5 kg (144 lb 6.4 oz)  SpO2 99%  BMI 26.4 kg/m2    Gen: intubated  CV: RRR, S1 & S2, systolic murmur in 2nd left ICS, LLB and apex  Lungs: CTAB anteriorly  Abd: slightly distended, soft  Ext: DP pulses not palpable but dopplerable per RN, no peripheral edema          Wt Readings from Last 5 Encounters:   03/31/17 65.5 kg (144 lb 6.4 oz)   03/27/17 62 kg (136 lb 11 oz)   07/15/08 68.5 kg (151 lb)     .labs    Labs and Imaging  Reviewed in epic  Flu swab negative    Echo 3/27  The visual ejection fraction is estimated at 30-35%.  There is extensive inferior, inferoseptal, and inferolateral wall akinesis.  Basal/mid lateral wall hypokinesis  There is " moderate to severe septal hypokinesis.  Septal wall motion abnormality may reflect pacemaker activation.  There is a catheter/pacemaker lead seen in the right ventricle.  The right ventricle is mildly dilated.  Severely decreased right ventricular systolic function  There is no pericardial effusion.  The rhythm was paced.  Compared to the prior study, the LVEF appears somewhat decreased. Septal wall  motion abnormality larger- may reflect, in part, that patient in sinus tach on  prior study, and ventricular paced now. No hemodynamically significant  valvular abnormalities on 2D or color flow imaging    CT chest/abdomen 3/27   IMPRESSION:   1. Small mediastinal hemorrhage, small bilateral pleural effusions  which may be hemorrhagic. Small intraperitoneal hemorrhage. Minimal  pericardial hemorrhage.  2. Minimal pneumoperitoneum in the left paracolic gutter, of unknown  significance. No pneumatosis as clinically questioned.  3. IABP slightly low in position.  4. Bilateral pulmonary dependent consolidations represent compression  atelectasis, however differentials include aspiration.    Cath 3/26 [facility]  SUMMARY:   --Inferior STEMI w/ late presentation. Culprit dictated by complete  heart block, and cardiogenic shock. Emergent echo in the Cath Lab also  shows significant RV dysfunction.  --Three vessel coronary artery disease with diffuse coronary disease  out to the distal vessels  --Successful POBA of the prox and mid-RCA. Stent was not placed, in  anticipation he may need to be considered for bypass surgery in the  near future  --Insertion of a temporary pacemaker for bradycardia (AVB) and  hypotension  --Insertion of a IABP  --Upper GI bleed consistent with Kaylin-Bonilla    [facility] 3/27    CT head 3/28: normal     Echo 3/29  Interpretation Summary  Limited study. Ischemic CM. Moderately (EF 30-35%) reduced left ventricular  function is present. Traced at 32%.  Posterior wall akinesis is present.  Lateral wall akinesis is present. Inferior  wall akinesis is present.  Right ventricular function, chamber size, wall motion, and thickness are  normal.  Dilation of the inferior vena cava is present with abnormal respiratory  variation in diameter.     Compared to prior study, RV fxn is improved.    Echo 3/31  Left Ventricle  The Ejection Fraction is estimated at 50-55%. Inferior,posterior,lateral,basal  septal wall akinesis as reported before. No change.    Plan for today  1) will consider Coronary angiogram given persistent ECG changes this AM  2) free water 200cc q4hrs  3) will treat for 7 days total with antibiotics. Taper off stress dose steroids  4) Goal CVP<12. Bumex 5mg bolus, chlorothiazide IV. Has had good response  5)     Card  # Mixed shock: Cardiogenic from biventricular failure from inferior STEMI and distributive shock from post-MI SIRS response vs aspiration pneumonia/pneumonitis from possible aspiration during cath at [facility] on 3/26  # Due to inferior wall STEMI. S/P 7 stents to LAD, circ, RCA (angiogram report pending). Now with IABP, temporary pacemaker after 3rd deg heart block in cath lab at [facility] on 3/26   # Small pericardial hemorrhage  - Continue IABP for coronary perfusion  -temp pacer removed 3/29  - Dual antiplatelet therapy with ASA 81mg and plavix 75mg qday  - Will adjust plan according to hemodynamics and further test results.   -eventually will start statin     # Neuro  -Slow to wake up- likely from sedation in setting of shock liver,   Sedation versed gtt + PRNs  Analgesia fentanyl gtt + PRNs  Daily sedation holidays once stabilized        Respiratory  #Intubated for airway protection due to mental status and emesis on 3/26.   #Probable Aspiration PNA/pneumonitis  #Small mediastinal hemorrhage       # ID  . Sepsis from aspiration pneumonia/pneumonitis vs MRSA pneumonia vs post-MI SIRS response  - repeat cultures if febrile  - Unasyn  - Hydrocortisone 50mg q6hr for BP  support     # Endocrine  T2DM: HA1C 7.5 yesterday on admission. BG climibing to 170s since admission. Will likely continue to rise with steroids.   - Insulin gtt per nurising protocol with hypoglycemia precautions.      # GI  Minimal pneumoperitoneum, unclear etiology: Seen by General surgery.   -Conservative Mx for now    Shock liver injury with coagulopathy-improving trend LFTs,   -Vit K 2.5mg 3/28, Vit K 3/29       # Renal/  Acute kidney injury-nonoliguric: multifactorial: hypoperfusion, contrast etc.  -Diuresis  - Daily Cr  - Avoid nephrotoxins as able  - Chavis placed 3/26    -Hypernatremia- free water    #Hem  -thrombocytopenia-likely from balloon pump  -Anemia- multifactorial     FEN: feeding tube  Code: FULL  PPx:  PPI 40mg IV BID, senna PRN  Dispo: pending stability    Will staff with Dr. Talon Park  General Cardiology fellow, PGY4  Pager [phone]

## 2017-03-31 NOTE — PLAN OF CARE
Problem: Goal Outcome Summary  Goal: Goal Outcome Summary  Outcome: No Change    D/I: Pt remains ST 100s-110s. IABP 1:1 with 100% augmentation. Assisted pressures running 60s-80s/50s-60s with MAP 60s-80s and augmenting 80s-110s. When pump pauses to recalibrate itself, native pressures running 70s/50s with MAP 50s-60s. SVP trending down: 19 --> 16. PA running 30s/20s. Dampened PA waveform with wedge-like appearance. Dr Charles here to assess, obtained CXR at start of shift to verify position, and tip is in the PA. SVR trending down: ~1600 --> ~1200. Marvin CI 2.1. SvO2 48-50. Remains on nitroglycerine gtt. Drops BP significantly (SBP to 50s-60s with MAP 30s-40s) after PO hydralazine given, even with nitro titration. tMax 99.1. Remains on fixed-rate doBUTamine     Overbreathes vent settings for RR teens. Vent changed out overnight and no further minute volume/apnea alarms.      Remains sedated with fentanyl and precedex drips at very low rates. Will resist cares weakly with turns, but otherwise minimal interaction. Will cough against vent and move torso, but no movement of extremities seen. Therefore, restraints DCd. COOKIE. No attempt to communicate seen. Eyes do not appear to track or focus. Fentanyl bumps given for excessive coughing and shivering.      Total UO since midnight = 1360cc. Weight is up 3.3 kgs from yesterday, which was double checked as it is not consistent with I/O. 2 very large loose stools: rectal tube placed. TF advanced per orders. OG with 300cc out since midnight. Continues on bumex gtt.      AM labs noted: replacing potassium per protocol throughout the night. Dr Gee updated re: hgb, lactate, and troponin. Watching for now. Heparin gtt off last evening per Dr Gee as IABP is now 1:1. Continues on insulin gtt, algorithm 4 with BG running 110s-190s. At start of shift PICC arm circumference noted to be 2 cm more than when done at time of placement (~2.5 hours elapsed). Warm pack applied per  PICC orders and MD updated: watching closely overnight. Beginning to trend back down and is almost back at insertion circumference.      A/P: pt tolerating current plan of care. Will report off to oncoming nurse.      -Airam Javed RN 3/31/2017 6:00 AM

## 2017-03-31 NOTE — PHARMACY-VANCOMYCIN DOSING SERVICE
Drug Level Evaluation    Patient is NO LONGER receiving Vancomycin    .  A level was drawn with AM labs on 3/321. The measured level result is 29 mg/L    This medication level is invalid because the medication was discontinued. No further assessment can be made at this time.    Mayi Ordonez, PharmD  CVICU and Advanced Heart Failure Pharmacist  Pager [phone]

## 2017-03-31 NOTE — PLAN OF CARE
Problem: Restraint for Non-Violent/Non-Self-Destructive Behavior  Goal: Prevent/Manage Potential Problems  Maintain safety of patient and others during period of restraint.  Promote psychological and physical wellbeing.  Prevent injury to skin and involved body parts.  Promote nutrition, hydration, and elimination.   Restraints placed at 1600, pt waking up and attempting to get to ETT. MD valentin

## 2017-03-31 NOTE — CONSULTS
[facility]: [place]  Neurology Consultation    Patient Name:  Luke Henao  MRN:  [id number]    :  1965  Date of Admission:  3/27/2017  Date of Service:  2017  Primary care provider:  No Ref-Primary, Physician      We were asked to see the patient by Cards 2 team to evaluate:  Encephalopathy    History of Present Illness: (History obtained from chart review, patient currently intubated)  Luke Henao is a 51 year old male with history of DMII who presented on 3/27/17 as a transfer from [facility] for cardiogenic shock for inferior wall STEMI.  Neurology is being consulted for encephalopathy.    Patient presented to [facility] on 3/26 for vomiting, back and shoulder pain, and weakness and found to have inferior wall STEMI.  Patient was taken to angiogram and had stenting placed.  Patient was intubated during angiogram for airway protection due to frequent vomiting and AMS.  IABP placed and transferred to [facility] for further management of cardiogenic shock.    Patient has been on sedation since intubation.  Versed has been off since noon 3/29.  Remains on fentanyl and precedex.  Per nursing report, patient had increased movement and coughing with decreased levels of fentanyl yesterday.  Unclear if followed any commands.  Nursing notes states patient may have been nodding head to daughters' voice.  Fentanyl was increased again later in the evening.  This AM, grimaces with suctioning but no purposeful movements.    ROS: A 10-point unable to be completed due to intubation.    Past Medical/Surgical History:  DMII    Medications:    Unasyn  Aspirin  Bumex gtt  Plavix  Hydrocortisone  Pantoprazole  Dobutamine gtt  Insulin gtt  Nitro gtt  Fentanyl gtt    Allergies:   No Known Allergies    Social History: Unable to obtain due to intubation    Family History:  Unable to obtain due to intubation    Neurologic Examination:    Vitals: /52  Temp 98.3  F (36.8  C) (Axillary)  Resp 12   "Ht 1.575 m (5' 2.01\")  Wt 65.5 kg (144 lb 6.4 oz)  SpO2 99%  BMI 26.4 kg/m2  General: Cooperative, NAD  HEENT: + Scleral icterus  Neurologic:     Mental Status: Intubated and sedated, does not arouse to sternal rub, voice, or withdraw to pain.     Cranial Nerves: Pupils 2mm, equal and reactive to light.  Face symmetric at rest.     Motor: Unable to assess, normal tone in both arms and left leg, did not move right leg due to IABP.     Deep Tendon Reflexes: 2+ in biceps and brachioradialis, unable to elicit in patellar.     Sensory: Unable to assess     Coordination: Unable to assess     Station/Gait: Unable to assess  Extremities: Edema present in all four extremities.  IABP in place in right groin.    Pertinent Investigations:   CT head 3/28: No acute intracranial pathology.    Impression:  Luke Henao is a 51 year old male with history of DMII who presented on 3/27/17 as a transfer from [facility] for cardiogenic shock for inferior wall STEMI.  Neurology is being consulted for encephalopathy.    Encephalopathy likely multifactorial.  Patient has been on versed for 3 days, which may be taking longer to clear given hepatic injury.  Still remains on fentanyl, which may also be contributing.  Brainstem reflexes are still intact and there is some question of whether patient was nodding to his daughter off fentanyl.  Patient also presented with hypotension and several charted hypotensive episodes during this hospital admission, raising concern for possible anoxic brain injury.  With balloon pump and poor cardiac output, stroke would also be a consideration.  While spontaneous movements witnessed yesterday are encouraging, too early to comment on prognosis.    Recommendations:   --Would recommend brain MRI to evaluate for anoxic brain injury, stroke, or other intracranial pathology.  If unable to have MRI over the next several days, would recommend repeat head CT in the interim.  --30 minute bedside EEG ordered for you " to rule out seizure.  --Reduce sedating medications as able.  --Follow neuro exams.    Thank you for involving neurology in the care of Luke Henao.  Please do not hesitate to call with questions/concerns.      Patient was seen and discussed with attending neurologist, Dr. Devi Lorenzo  IM PGY3

## 2017-03-31 NOTE — PLAN OF CARE
"Problem: Goal Outcome Summary  Goal: Goal Outcome Summary  Outcome: No Change  D/I: Heparin gtt started this am and IABP changed to 1:2 @ 14:00 by MD.  NTG increased this am  r/t increasing hypertension. Episode @ 17:15 of sudden onset hypotension, w/ IABP and B/P MAP 50s. MD notified. IABP changed back to 1:1. NTG held and Dopamine gtt started. B/P rebounded within a few minutes and quickly became hypertensive. Dopamine stopped and restarted NTG gtt. EKG done x2 showing possible acute STEMI. MD aware. Troponin sent. Precedex decreased to 0.1 and Fentanyl decreased to 25 mcg/hr this am. Increased movement and coughing early afternoon. Nodded head \"yes\" x2 to daughters question. No other purposeful movement to command noted. With decreased sedation, pt setting off vent alarms w/ low MV and apnea. Appears to be \"fighting\" vent and \"holding breath\" or bearing down w/ vent respirations. Minimal response to Lasix 80 mg IV x1 this am. Cr continues to rise. Bumex bolus and gtt started. Diuril IV x1 given this pm. Hemodynamics more labile today:  CVP 14-18. PAP 32/24-40/30. SVO2 50s-40s.  CI 1.8-2.2.  PICC line placed today. LFV TL d/trae. Family updated by MD .  P: continue to monitor hemodynamics closely. Serial Troponins. Monitor urine response to increased diuretics. Monitor for changes in LOC.           "

## 2017-04-01 ENCOUNTER — APPOINTMENT (OUTPATIENT)
Dept: GENERAL RADIOLOGY | Facility: CLINIC | Age: 52
DRG: 228 | End: 2017-04-01
Attending: INTERNAL MEDICINE
Payer: COMMERCIAL

## 2017-04-01 ENCOUNTER — APPOINTMENT (OUTPATIENT)
Dept: CARDIOLOGY | Facility: CLINIC | Age: 52
DRG: 228 | End: 2017-04-01
Attending: INTERNAL MEDICINE
Payer: COMMERCIAL

## 2017-04-01 LAB
ABO + RH BLD: NORMAL
ABO + RH BLD: NORMAL
ALBUMIN SERPL-MCNC: 2.5 G/DL (ref 3.4–5)
ALP SERPL-CCNC: 74 U/L (ref 40–150)
ALT SERPL W P-5'-P-CCNC: 1990 U/L (ref 0–70)
ANION GAP SERPL CALCULATED.3IONS-SCNC: 10 MMOL/L (ref 3–14)
ANION GAP SERPL CALCULATED.3IONS-SCNC: 10 MMOL/L (ref 3–14)
APTT PPP: 44 SEC (ref 22–37)
AST SERPL W P-5'-P-CCNC: 599 U/L (ref 0–45)
BASE EXCESS BLDV CALC-SCNC: 10 MMOL/L
BASE EXCESS BLDV CALC-SCNC: 6.3 MMOL/L
BASE EXCESS BLDV CALC-SCNC: 7.9 MMOL/L
BASE EXCESS BLDV CALC-SCNC: 9 MMOL/L
BASE EXCESS BLDV CALC-SCNC: 9.1 MMOL/L
BASE EXCESS BLDV CALC-SCNC: 9.3 MMOL/L
BASE EXCESS BLDV CALC-SCNC: 9.5 MMOL/L
BASE EXCESS BLDV CALC-SCNC: 9.8 MMOL/L
BILIRUB DIRECT SERPL-MCNC: 0.3 MG/DL (ref 0–0.2)
BILIRUB SERPL-MCNC: 1 MG/DL (ref 0.2–1.3)
BLD GP AB SCN SERPL QL: NORMAL
BLOOD BANK CMNT PATIENT-IMP: NORMAL
BUN SERPL-MCNC: 66 MG/DL (ref 7–30)
BUN SERPL-MCNC: 67 MG/DL (ref 7–30)
CA-I BLD-MCNC: 4.2 MG/DL (ref 4.4–5.2)
CALCIUM SERPL-MCNC: 7.2 MG/DL (ref 8.5–10.1)
CALCIUM SERPL-MCNC: 7.6 MG/DL (ref 8.5–10.1)
CHLORIDE SERPL-SCNC: 103 MMOL/L (ref 94–109)
CHLORIDE SERPL-SCNC: 107 MMOL/L (ref 94–109)
CK SERPL-CCNC: 808 U/L (ref 30–300)
CO2 SERPL-SCNC: 30 MMOL/L (ref 20–32)
CO2 SERPL-SCNC: 32 MMOL/L (ref 20–32)
CREAT SERPL-MCNC: 3.02 MG/DL (ref 0.66–1.25)
CREAT SERPL-MCNC: 3.38 MG/DL (ref 0.66–1.25)
ERYTHROCYTE [DISTWIDTH] IN BLOOD BY AUTOMATED COUNT: 13.8 % (ref 10–15)
GFR SERPL CREATININE-BSD FRML MDRD: 19 ML/MIN/1.7M2
GFR SERPL CREATININE-BSD FRML MDRD: 22 ML/MIN/1.7M2
GLUCOSE BLDC GLUCOMTR-MCNC: 101 MG/DL (ref 70–99)
GLUCOSE BLDC GLUCOMTR-MCNC: 110 MG/DL (ref 70–99)
GLUCOSE BLDC GLUCOMTR-MCNC: 112 MG/DL (ref 70–99)
GLUCOSE BLDC GLUCOMTR-MCNC: 118 MG/DL (ref 70–99)
GLUCOSE BLDC GLUCOMTR-MCNC: 121 MG/DL (ref 70–99)
GLUCOSE BLDC GLUCOMTR-MCNC: 126 MG/DL (ref 70–99)
GLUCOSE BLDC GLUCOMTR-MCNC: 127 MG/DL (ref 70–99)
GLUCOSE BLDC GLUCOMTR-MCNC: 132 MG/DL (ref 70–99)
GLUCOSE BLDC GLUCOMTR-MCNC: 137 MG/DL (ref 70–99)
GLUCOSE BLDC GLUCOMTR-MCNC: 142 MG/DL (ref 70–99)
GLUCOSE BLDC GLUCOMTR-MCNC: 146 MG/DL (ref 70–99)
GLUCOSE BLDC GLUCOMTR-MCNC: 148 MG/DL (ref 70–99)
GLUCOSE BLDC GLUCOMTR-MCNC: 153 MG/DL (ref 70–99)
GLUCOSE BLDC GLUCOMTR-MCNC: 159 MG/DL (ref 70–99)
GLUCOSE BLDC GLUCOMTR-MCNC: 170 MG/DL (ref 70–99)
GLUCOSE BLDC GLUCOMTR-MCNC: 177 MG/DL (ref 70–99)
GLUCOSE BLDC GLUCOMTR-MCNC: 184 MG/DL (ref 70–99)
GLUCOSE BLDC GLUCOMTR-MCNC: 184 MG/DL (ref 70–99)
GLUCOSE BLDC GLUCOMTR-MCNC: 187 MG/DL (ref 70–99)
GLUCOSE BLDC GLUCOMTR-MCNC: 188 MG/DL (ref 70–99)
GLUCOSE BLDC GLUCOMTR-MCNC: 196 MG/DL (ref 70–99)
GLUCOSE BLDC GLUCOMTR-MCNC: 99 MG/DL (ref 70–99)
GLUCOSE SERPL-MCNC: 128 MG/DL (ref 70–99)
GLUCOSE SERPL-MCNC: 188 MG/DL (ref 70–99)
HCO3 BLDV-SCNC: 31 MMOL/L (ref 21–28)
HCO3 BLDV-SCNC: 33 MMOL/L (ref 21–28)
HCO3 BLDV-SCNC: 33 MMOL/L (ref 21–28)
HCO3 BLDV-SCNC: 34 MMOL/L (ref 21–28)
HCT VFR BLD AUTO: 27.5 % (ref 40–53)
HGB BLD-MCNC: 8.8 G/DL (ref 13.3–17.7)
INR PPP: 1.37 (ref 0.86–1.14)
LACTATE BLD-SCNC: 1.5 MMOL/L (ref 0.7–2.1)
LACTATE BLD-SCNC: 1.5 MMOL/L (ref 0.7–2.1)
LACTATE BLD-SCNC: 1.7 MMOL/L (ref 0.7–2.1)
LACTATE BLD-SCNC: 2.2 MMOL/L (ref 0.7–2.1)
LACTATE BLD-SCNC: 2.3 MMOL/L (ref 0.7–2.1)
LACTATE BLD-SCNC: 2.3 MMOL/L (ref 0.7–2.1)
LMWH PPP CHRO-ACNC: NORMAL IU/ML
MAGNESIUM SERPL-MCNC: 2.2 MG/DL (ref 1.6–2.3)
MAGNESIUM SERPL-MCNC: 2.3 MG/DL (ref 1.6–2.3)
MAGNESIUM SERPL-MCNC: 2.3 MG/DL (ref 1.6–2.3)
MCH RBC QN AUTO: 29.4 PG (ref 26.5–33)
MCHC RBC AUTO-ENTMCNC: 32 G/DL (ref 31.5–36.5)
MCV RBC AUTO: 92 FL (ref 78–100)
O2/TOTAL GAS SETTING VFR VENT: 40 %
OXYHGB MFR BLDV: 56 %
OXYHGB MFR BLDV: 57 %
OXYHGB MFR BLDV: 57 %
OXYHGB MFR BLDV: 60 %
OXYHGB MFR BLDV: 62 %
PCO2 BLDV: 42 MM HG (ref 40–50)
PCO2 BLDV: 44 MM HG (ref 40–50)
PCO2 BLDV: 45 MM HG (ref 40–50)
PCO2 BLDV: 46 MM HG (ref 40–50)
PCO2 BLDV: 47 MM HG (ref 40–50)
PCO2 BLDV: 49 MM HG (ref 40–50)
PH BLDV: 7.43 PH (ref 7.32–7.43)
PH BLDV: 7.45 PH (ref 7.32–7.43)
PH BLDV: 7.45 PH (ref 7.32–7.43)
PH BLDV: 7.47 PH (ref 7.32–7.43)
PH BLDV: 7.48 PH (ref 7.32–7.43)
PH BLDV: 7.48 PH (ref 7.32–7.43)
PH BLDV: 7.5 PH (ref 7.32–7.43)
PH BLDV: 7.51 PH (ref 7.32–7.43)
PHOSPHATE SERPL-MCNC: 2.9 MG/DL (ref 2.5–4.5)
PHOSPHATE SERPL-MCNC: 3.1 MG/DL (ref 2.5–4.5)
PLATELET # BLD AUTO: 124 10E9/L (ref 150–450)
PO2 BLDV: 31 MM HG (ref 25–47)
PO2 BLDV: 32 MM HG (ref 25–47)
PO2 BLDV: 33 MM HG (ref 25–47)
PO2 BLDV: 36 MM HG (ref 25–47)
POTASSIUM BLD-SCNC: 3 MMOL/L (ref 3.4–5.3)
POTASSIUM BLD-SCNC: 3.2 MMOL/L (ref 3.4–5.3)
POTASSIUM BLD-SCNC: 3.3 MMOL/L (ref 3.4–5.3)
POTASSIUM BLD-SCNC: 3.5 MMOL/L (ref 3.4–5.3)
POTASSIUM BLD-SCNC: 3.6 MMOL/L (ref 3.4–5.3)
POTASSIUM BLD-SCNC: 3.9 MMOL/L (ref 3.4–5.3)
POTASSIUM BLD-SCNC: 3.9 MMOL/L (ref 3.4–5.3)
POTASSIUM SERPL-SCNC: 3.5 MMOL/L (ref 3.4–5.3)
POTASSIUM SERPL-SCNC: 3.9 MMOL/L (ref 3.4–5.3)
PROT SERPL-MCNC: 5.9 G/DL (ref 6.8–8.8)
RADIOLOGIST FLAGS: ABNORMAL
RBC # BLD AUTO: 2.99 10E12/L (ref 4.4–5.9)
SODIUM BLD-SCNC: 146 MMOL/L (ref 133–144)
SODIUM SERPL-SCNC: 146 MMOL/L (ref 133–144)
SODIUM SERPL-SCNC: 147 MMOL/L (ref 133–144)
SPECIMEN EXP DATE BLD: NORMAL
TROPONIN I SERPL-MCNC: 62.48 UG/L (ref 0–0.04)
WBC # BLD AUTO: 15.8 10E9/L (ref 4–11)

## 2017-04-01 PROCEDURE — 80048 BASIC METABOLIC PNL TOTAL CA: CPT | Performed by: INTERNAL MEDICINE

## 2017-04-01 PROCEDURE — 85520 HEPARIN ASSAY: CPT | Performed by: INTERNAL MEDICINE

## 2017-04-01 PROCEDURE — 25000128 H RX IP 250 OP 636: Performed by: INTERNAL MEDICINE

## 2017-04-01 PROCEDURE — 25000132 ZZH RX MED GY IP 250 OP 250 PS 637

## 2017-04-01 PROCEDURE — 25000128 H RX IP 250 OP 636: Performed by: STUDENT IN AN ORGANIZED HEALTH CARE EDUCATION/TRAINING PROGRAM

## 2017-04-01 PROCEDURE — 85730 THROMBOPLASTIN TIME PARTIAL: CPT | Performed by: INTERNAL MEDICINE

## 2017-04-01 PROCEDURE — 25000132 ZZH RX MED GY IP 250 OP 250 PS 637: Performed by: INTERNAL MEDICINE

## 2017-04-01 PROCEDURE — 25000125 ZZHC RX 250: Performed by: INTERNAL MEDICINE

## 2017-04-01 PROCEDURE — B2111ZZ FLUOROSCOPY OF MULTIPLE CORONARY ARTERIES USING LOW OSMOLAR CONTRAST: ICD-10-PCS | Performed by: INTERNAL MEDICINE

## 2017-04-01 PROCEDURE — 20000004 ZZH R&B ICU UMMC

## 2017-04-01 PROCEDURE — 27210437 ZZH NUTRITION PRODUCT SEMIELEM INTERMED LITER

## 2017-04-01 PROCEDURE — 84132 ASSAY OF SERUM POTASSIUM: CPT | Performed by: INTERNAL MEDICINE

## 2017-04-01 PROCEDURE — 83735 ASSAY OF MAGNESIUM: CPT | Performed by: INTERNAL MEDICINE

## 2017-04-01 PROCEDURE — 94003 VENT MGMT INPAT SUBQ DAY: CPT

## 2017-04-01 PROCEDURE — 85027 COMPLETE CBC AUTOMATED: CPT | Performed by: INTERNAL MEDICINE

## 2017-04-01 PROCEDURE — S0171 BUMETANIDE 0.5 MG: HCPCS | Performed by: INTERNAL MEDICINE

## 2017-04-01 PROCEDURE — 40000196 ZZH STATISTIC RAPCV CVP MONITORING

## 2017-04-01 PROCEDURE — 80053 COMPREHEN METABOLIC PANEL: CPT | Performed by: INTERNAL MEDICINE

## 2017-04-01 PROCEDURE — 27210946 ZZH KIT HC TOTES DISP CR8

## 2017-04-01 PROCEDURE — 27210742 ZZH CATH CR1

## 2017-04-01 PROCEDURE — 82550 ASSAY OF CK (CPK): CPT | Performed by: INTERNAL MEDICINE

## 2017-04-01 PROCEDURE — 82330 ASSAY OF CALCIUM: CPT | Performed by: INTERNAL MEDICINE

## 2017-04-01 PROCEDURE — 27211089 ZZH KIT ACIST INJECTOR CR3

## 2017-04-01 PROCEDURE — 93454 CORONARY ARTERY ANGIO S&I: CPT

## 2017-04-01 PROCEDURE — 40000940 XR CHEST PORT 1 VW

## 2017-04-01 PROCEDURE — 93010 ELECTROCARDIOGRAM REPORT: CPT | Performed by: INTERNAL MEDICINE

## 2017-04-01 PROCEDURE — 40000275 ZZH STATISTIC RCP TIME EA 10 MIN

## 2017-04-01 PROCEDURE — 71010 XR CHEST PORT 1 VW: CPT | Mod: 77

## 2017-04-01 PROCEDURE — 82805 BLOOD GASES W/O2 SATURATION: CPT | Performed by: INTERNAL MEDICINE

## 2017-04-01 PROCEDURE — 40000076 ZZH STATISTIC IABP MONITORING

## 2017-04-01 PROCEDURE — 00000146 ZZHCL STATISTIC GLUCOSE BY METER IP

## 2017-04-01 PROCEDURE — 25000125 ZZHC RX 250: Performed by: STUDENT IN AN ORGANIZED HEALTH CARE EDUCATION/TRAINING PROGRAM

## 2017-04-01 PROCEDURE — 82248 BILIRUBIN DIRECT: CPT | Performed by: INTERNAL MEDICINE

## 2017-04-01 PROCEDURE — 93005 ELECTROCARDIOGRAM TRACING: CPT

## 2017-04-01 PROCEDURE — 99356 ZZC PROLONGED SERV,INPATIENT,1ST HR: CPT | Mod: 25 | Performed by: INTERNAL MEDICINE

## 2017-04-01 PROCEDURE — 40000048 ZZH STATISTIC DAILY SWAN MONITORING

## 2017-04-01 PROCEDURE — 84295 ASSAY OF SERUM SODIUM: CPT | Performed by: INTERNAL MEDICINE

## 2017-04-01 PROCEDURE — 85610 PROTHROMBIN TIME: CPT | Performed by: INTERNAL MEDICINE

## 2017-04-01 PROCEDURE — 84100 ASSAY OF PHOSPHORUS: CPT | Performed by: INTERNAL MEDICINE

## 2017-04-01 PROCEDURE — 99152 MOD SED SAME PHYS/QHP 5/>YRS: CPT

## 2017-04-01 PROCEDURE — 84484 ASSAY OF TROPONIN QUANT: CPT | Performed by: INTERNAL MEDICINE

## 2017-04-01 PROCEDURE — 83605 ASSAY OF LACTIC ACID: CPT | Performed by: INTERNAL MEDICINE

## 2017-04-01 PROCEDURE — 71010 XR CHEST PORT 1 VW: CPT

## 2017-04-01 PROCEDURE — 86901 BLOOD TYPING SEROLOGIC RH(D): CPT | Performed by: INTERNAL MEDICINE

## 2017-04-01 PROCEDURE — 27210787 ZZH MANIFOLD CR2

## 2017-04-01 PROCEDURE — C1894 INTRO/SHEATH, NON-LASER: HCPCS

## 2017-04-01 PROCEDURE — 25000132 ZZH RX MED GY IP 250 OP 250 PS 637: Performed by: STUDENT IN AN ORGANIZED HEALTH CARE EDUCATION/TRAINING PROGRAM

## 2017-04-01 PROCEDURE — 86850 RBC ANTIBODY SCREEN: CPT | Performed by: INTERNAL MEDICINE

## 2017-04-01 PROCEDURE — 93454 CORONARY ARTERY ANGIO S&I: CPT | Mod: 26 | Performed by: INTERNAL MEDICINE

## 2017-04-01 PROCEDURE — 86900 BLOOD TYPING SEROLOGIC ABO: CPT | Performed by: INTERNAL MEDICINE

## 2017-04-01 RX ORDER — NICARDIPINE HYDROCHLORIDE 2.5 MG/ML
100 INJECTION INTRAVENOUS
Status: DISCONTINUED | OUTPATIENT
Start: 2017-04-01 | End: 2017-04-01

## 2017-04-01 RX ORDER — AMPICILLIN AND SULBACTAM 2; 1 G/1; G/1
3 INJECTION, POWDER, FOR SOLUTION INTRAMUSCULAR; INTRAVENOUS EVERY 12 HOURS
Status: DISCONTINUED | OUTPATIENT
Start: 2017-04-01 | End: 2017-04-03

## 2017-04-01 RX ORDER — LORAZEPAM 2 MG/ML
.5-2 INJECTION INTRAMUSCULAR EVERY 4 HOURS PRN
Status: DISCONTINUED | OUTPATIENT
Start: 2017-04-01 | End: 2017-04-01

## 2017-04-01 RX ORDER — DIPHENHYDRAMINE HYDROCHLORIDE 50 MG/ML
25-50 INJECTION INTRAMUSCULAR; INTRAVENOUS
Status: DISCONTINUED | OUTPATIENT
Start: 2017-04-01 | End: 2017-04-01

## 2017-04-01 RX ORDER — LIDOCAINE 40 MG/G
CREAM TOPICAL
Status: DISCONTINUED | OUTPATIENT
Start: 2017-04-01 | End: 2017-04-01

## 2017-04-01 RX ORDER — DOPAMINE HYDROCHLORIDE 160 MG/100ML
2-20 INJECTION, SOLUTION INTRAVENOUS CONTINUOUS PRN
Status: DISCONTINUED | OUTPATIENT
Start: 2017-04-01 | End: 2017-04-01

## 2017-04-01 RX ORDER — PROMETHAZINE HYDROCHLORIDE 25 MG/ML
6.25-25 INJECTION, SOLUTION INTRAMUSCULAR; INTRAVENOUS EVERY 4 HOURS PRN
Status: DISCONTINUED | OUTPATIENT
Start: 2017-04-01 | End: 2017-04-01

## 2017-04-01 RX ORDER — EPTIFIBATIDE 2 MG/ML
1 INJECTION, SOLUTION INTRAVENOUS CONTINUOUS PRN
Status: DISCONTINUED | OUTPATIENT
Start: 2017-04-01 | End: 2017-04-01

## 2017-04-01 RX ORDER — EPTIFIBATIDE 2 MG/ML
180 INJECTION, SOLUTION INTRAVENOUS EVERY 10 MIN PRN
Status: DISCONTINUED | OUTPATIENT
Start: 2017-04-01 | End: 2017-04-01

## 2017-04-01 RX ORDER — IOPAMIDOL 755 MG/ML
10 INJECTION, SOLUTION INTRAVASCULAR ONCE
Status: DISCONTINUED | OUTPATIENT
Start: 2017-04-01 | End: 2017-04-01 | Stop reason: CLARIF

## 2017-04-01 RX ORDER — HEPARIN SODIUM 1000 [USP'U]/ML
1000-10000 INJECTION, SOLUTION INTRAVENOUS; SUBCUTANEOUS EVERY 5 MIN PRN
Status: DISCONTINUED | OUTPATIENT
Start: 2017-04-01 | End: 2017-04-01

## 2017-04-01 RX ORDER — NITROGLYCERIN 5 MG/ML
100-200 VIAL (ML) INTRAVENOUS
Status: DISCONTINUED | OUTPATIENT
Start: 2017-04-01 | End: 2017-04-01

## 2017-04-01 RX ORDER — ARGATROBAN 1 MG/ML
350 INJECTION, SOLUTION INTRAVENOUS
Status: DISCONTINUED | OUTPATIENT
Start: 2017-04-01 | End: 2017-04-01

## 2017-04-01 RX ORDER — NIFEDIPINE 10 MG/1
10 CAPSULE ORAL
Status: DISCONTINUED | OUTPATIENT
Start: 2017-04-01 | End: 2017-04-01

## 2017-04-01 RX ORDER — PROTAMINE SULFATE 10 MG/ML
25-100 INJECTION, SOLUTION INTRAVENOUS EVERY 5 MIN PRN
Status: DISCONTINUED | OUTPATIENT
Start: 2017-04-01 | End: 2017-04-01

## 2017-04-01 RX ORDER — ADENOSINE 3 MG/ML
12-12000 INJECTION, SOLUTION INTRAVENOUS
Status: DISCONTINUED | OUTPATIENT
Start: 2017-04-01 | End: 2017-04-01

## 2017-04-01 RX ORDER — HYDRALAZINE HYDROCHLORIDE 20 MG/ML
20 INJECTION INTRAMUSCULAR; INTRAVENOUS ONCE
Status: COMPLETED | OUTPATIENT
Start: 2017-04-01 | End: 2017-04-01

## 2017-04-01 RX ORDER — VERAPAMIL HYDROCHLORIDE 2.5 MG/ML
1-2.5 INJECTION, SOLUTION INTRAVENOUS
Status: DISCONTINUED | OUTPATIENT
Start: 2017-04-01 | End: 2017-04-01

## 2017-04-01 RX ORDER — POTASSIUM CHLORIDE 7.45 MG/ML
10 INJECTION INTRAVENOUS
Status: DISCONTINUED | OUTPATIENT
Start: 2017-04-01 | End: 2017-04-01

## 2017-04-01 RX ORDER — NITROGLYCERIN 20 MG/100ML
.07-2 INJECTION INTRAVENOUS CONTINUOUS PRN
Status: DISCONTINUED | OUTPATIENT
Start: 2017-04-01 | End: 2017-04-01

## 2017-04-01 RX ORDER — FLUMAZENIL 0.1 MG/ML
0.2 INJECTION, SOLUTION INTRAVENOUS
Status: DISCONTINUED | OUTPATIENT
Start: 2017-04-01 | End: 2017-04-01

## 2017-04-01 RX ORDER — IOPAMIDOL 755 MG/ML
10 INJECTION, SOLUTION INTRAVASCULAR ONCE
Status: COMPLETED | OUTPATIENT
Start: 2017-04-01 | End: 2017-04-01

## 2017-04-01 RX ORDER — NITROGLYCERIN 5 MG/ML
100-500 VIAL (ML) INTRAVENOUS
Status: DISCONTINUED | OUTPATIENT
Start: 2017-04-01 | End: 2017-04-01

## 2017-04-01 RX ORDER — FENTANYL CITRATE 50 UG/ML
25-50 INJECTION, SOLUTION INTRAMUSCULAR; INTRAVENOUS
Status: DISCONTINUED | OUTPATIENT
Start: 2017-04-01 | End: 2017-04-01

## 2017-04-01 RX ORDER — NITROGLYCERIN 0.4 MG/1
0.4 TABLET SUBLINGUAL EVERY 5 MIN PRN
Status: DISCONTINUED | OUTPATIENT
Start: 2017-04-01 | End: 2017-04-01

## 2017-04-01 RX ORDER — ASPIRIN 81 MG/1
81 TABLET ORAL DAILY
Status: DISCONTINUED | OUTPATIENT
Start: 2017-04-02 | End: 2017-04-01

## 2017-04-01 RX ORDER — ASPIRIN 81 MG/1
81-324 TABLET, CHEWABLE ORAL
Status: DISCONTINUED | OUTPATIENT
Start: 2017-04-01 | End: 2017-04-01

## 2017-04-01 RX ORDER — NALOXONE HYDROCHLORIDE 0.4 MG/ML
0.4 INJECTION, SOLUTION INTRAMUSCULAR; INTRAVENOUS; SUBCUTANEOUS EVERY 5 MIN PRN
Status: DISCONTINUED | OUTPATIENT
Start: 2017-04-01 | End: 2017-04-01

## 2017-04-01 RX ORDER — PHENYLEPHRINE HCL IN 0.9% NACL 1 MG/10 ML
20-100 SYRINGE (ML) INTRAVENOUS
Status: DISCONTINUED | OUTPATIENT
Start: 2017-04-01 | End: 2017-04-01

## 2017-04-01 RX ORDER — ARGATROBAN 1 MG/ML
150 INJECTION, SOLUTION INTRAVENOUS
Status: DISCONTINUED | OUTPATIENT
Start: 2017-04-01 | End: 2017-04-01

## 2017-04-01 RX ORDER — LIDOCAINE HYDROCHLORIDE 10 MG/ML
30 INJECTION, SOLUTION EPIDURAL; INFILTRATION; INTRACAUDAL; PERINEURAL
Status: DISCONTINUED | OUTPATIENT
Start: 2017-04-01 | End: 2017-04-01

## 2017-04-01 RX ORDER — DOBUTAMINE HYDROCHLORIDE 200 MG/100ML
2-20 INJECTION INTRAVENOUS CONTINUOUS PRN
Status: DISCONTINUED | OUTPATIENT
Start: 2017-04-01 | End: 2017-04-01

## 2017-04-01 RX ORDER — PRASUGREL 10 MG/1
10-60 TABLET, FILM COATED ORAL
Status: DISCONTINUED | OUTPATIENT
Start: 2017-04-01 | End: 2017-04-01

## 2017-04-01 RX ORDER — METHYLPREDNISOLONE SODIUM SUCCINATE 125 MG/2ML
125 INJECTION, POWDER, LYOPHILIZED, FOR SOLUTION INTRAMUSCULAR; INTRAVENOUS
Status: DISCONTINUED | OUTPATIENT
Start: 2017-04-01 | End: 2017-04-01

## 2017-04-01 RX ORDER — ENALAPRILAT 1.25 MG/ML
1.25-2.5 INJECTION INTRAVENOUS
Status: DISCONTINUED | OUTPATIENT
Start: 2017-04-01 | End: 2017-04-01

## 2017-04-01 RX ORDER — DEXTROSE MONOHYDRATE 25 G/50ML
12.5-5 INJECTION, SOLUTION INTRAVENOUS EVERY 30 MIN PRN
Status: DISCONTINUED | OUTPATIENT
Start: 2017-04-01 | End: 2017-04-01

## 2017-04-01 RX ORDER — ASPIRIN 325 MG
325 TABLET ORAL
Status: DISCONTINUED | OUTPATIENT
Start: 2017-04-01 | End: 2017-04-01

## 2017-04-01 RX ORDER — SODIUM NITROPRUSSIDE 25 MG/ML
100-200 INJECTION INTRAVENOUS
Status: DISCONTINUED | OUTPATIENT
Start: 2017-04-01 | End: 2017-04-01

## 2017-04-01 RX ORDER — FUROSEMIDE 10 MG/ML
20-100 INJECTION INTRAMUSCULAR; INTRAVENOUS
Status: DISCONTINUED | OUTPATIENT
Start: 2017-04-01 | End: 2017-04-01

## 2017-04-01 RX ORDER — POTASSIUM CHLORIDE 29.8 MG/ML
20 INJECTION INTRAVENOUS
Status: DISCONTINUED | OUTPATIENT
Start: 2017-04-01 | End: 2017-04-01

## 2017-04-01 RX ORDER — ONDANSETRON 2 MG/ML
4 INJECTION INTRAMUSCULAR; INTRAVENOUS EVERY 4 HOURS PRN
Status: DISCONTINUED | OUTPATIENT
Start: 2017-04-01 | End: 2017-04-01

## 2017-04-01 RX ORDER — PROTAMINE SULFATE 10 MG/ML
1-5 INJECTION, SOLUTION INTRAVENOUS
Status: DISCONTINUED | OUTPATIENT
Start: 2017-04-01 | End: 2017-04-01

## 2017-04-01 RX ORDER — CLOPIDOGREL BISULFATE 75 MG/1
300-600 TABLET ORAL
Status: DISCONTINUED | OUTPATIENT
Start: 2017-04-01 | End: 2017-04-01

## 2017-04-01 RX ORDER — HYDRALAZINE HYDROCHLORIDE 20 MG/ML
10-20 INJECTION INTRAMUSCULAR; INTRAVENOUS
Status: DISCONTINUED | OUTPATIENT
Start: 2017-04-01 | End: 2017-04-01

## 2017-04-01 RX ORDER — CLOPIDOGREL BISULFATE 75 MG/1
75 TABLET ORAL
Status: DISCONTINUED | OUTPATIENT
Start: 2017-04-01 | End: 2017-04-01

## 2017-04-01 RX ORDER — LIDOCAINE 40 MG/G
CREAM TOPICAL
Status: DISCONTINUED | OUTPATIENT
Start: 2017-04-01 | End: 2017-05-01

## 2017-04-01 RX ADMIN — MULTIVIT AND MINERALS-FERROUS GLUCONATE 9 MG IRON/15 ML ORAL LIQUID 15 ML: at 08:33

## 2017-04-01 RX ADMIN — FENTANYL CITRATE 100 MCG: 50 INJECTION, SOLUTION INTRAMUSCULAR; INTRAVENOUS at 04:56

## 2017-04-01 RX ADMIN — SENNOSIDES AND DOCUSATE SODIUM 1 TABLET: 8.6; 5 TABLET ORAL at 19:53

## 2017-04-01 RX ADMIN — FENTANYL CITRATE 100 MCG: 50 INJECTION, SOLUTION INTRAMUSCULAR; INTRAVENOUS at 05:48

## 2017-04-01 RX ADMIN — NITROGLYCERIN 1.5 MCG/KG/MIN: 20 INJECTION INTRAVENOUS at 00:22

## 2017-04-01 RX ADMIN — NITROGLYCERIN 1.5 MCG/KG/MIN: 20 INJECTION INTRAVENOUS at 22:30

## 2017-04-01 RX ADMIN — IOPAMIDOL 10 ML: 755 INJECTION, SOLUTION INTRAVASCULAR at 11:00

## 2017-04-01 RX ADMIN — HYDROCORTISONE SODIUM SUCCINATE 50 MG: 100 INJECTION, POWDER, FOR SOLUTION INTRAMUSCULAR; INTRAVENOUS at 01:38

## 2017-04-01 RX ADMIN — POTASSIUM CHLORIDE 20 MEQ: 29.8 INJECTION, SOLUTION INTRAVENOUS at 16:31

## 2017-04-01 RX ADMIN — ASPIRIN 81 MG CHEWABLE TABLET 81 MG: 81 TABLET CHEWABLE at 08:33

## 2017-04-01 RX ADMIN — POTASSIUM CHLORIDE 20 MEQ: 29.8 INJECTION, SOLUTION INTRAVENOUS at 06:47

## 2017-04-01 RX ADMIN — FENTANYL CITRATE 75 MCG: 50 INJECTION, SOLUTION INTRAMUSCULAR; INTRAVENOUS at 02:22

## 2017-04-01 RX ADMIN — HYDROCORTISONE SODIUM SUCCINATE 50 MG: 100 INJECTION, POWDER, FOR SOLUTION INTRAMUSCULAR; INTRAVENOUS at 15:14

## 2017-04-01 RX ADMIN — NITROGLYCERIN 2 MCG/KG/MIN: 20 INJECTION INTRAVENOUS at 15:43

## 2017-04-01 RX ADMIN — FENTANYL CITRATE 100 MCG: 50 INJECTION, SOLUTION INTRAMUSCULAR; INTRAVENOUS at 12:39

## 2017-04-01 RX ADMIN — MIDAZOLAM 2 MG: 1 INJECTION INTRAMUSCULAR; INTRAVENOUS at 23:21

## 2017-04-01 RX ADMIN — MIDAZOLAM 3 MG: 1 INJECTION INTRAMUSCULAR; INTRAVENOUS at 13:56

## 2017-04-01 RX ADMIN — POTASSIUM CHLORIDE 20 MEQ: 29.8 INJECTION, SOLUTION INTRAVENOUS at 12:28

## 2017-04-01 RX ADMIN — DEXMEDETOMIDINE 1 MCG/KG/HR: 100 INJECTION, SOLUTION, CONCENTRATE INTRAVENOUS at 19:02

## 2017-04-01 RX ADMIN — AMPICILLIN SODIUM AND SULBACTAM SODIUM 3 G: 2; 1 INJECTION, POWDER, FOR SOLUTION INTRAMUSCULAR; INTRAVENOUS at 09:43

## 2017-04-01 RX ADMIN — FENTANYL CITRATE 50 MCG: 50 INJECTION, SOLUTION INTRAMUSCULAR; INTRAVENOUS at 19:45

## 2017-04-01 RX ADMIN — POTASSIUM CHLORIDE 20 MEQ: 29.8 INJECTION, SOLUTION INTRAVENOUS at 01:32

## 2017-04-01 RX ADMIN — FENTANYL CITRATE 100 MCG/HR: 50 INJECTION, SOLUTION INTRAMUSCULAR; INTRAVENOUS at 09:38

## 2017-04-01 RX ADMIN — FENTANYL CITRATE 50 MCG: 50 INJECTION, SOLUTION INTRAMUSCULAR; INTRAVENOUS at 14:00

## 2017-04-01 RX ADMIN — POTASSIUM CHLORIDE 20 MEQ: 29.8 INJECTION, SOLUTION INTRAVENOUS at 00:37

## 2017-04-01 RX ADMIN — FENTANYL CITRATE 100 MCG: 50 INJECTION, SOLUTION INTRAMUSCULAR; INTRAVENOUS at 11:45

## 2017-04-01 RX ADMIN — FENTANYL CITRATE 75 MCG: 50 INJECTION, SOLUTION INTRAMUSCULAR; INTRAVENOUS at 01:45

## 2017-04-01 RX ADMIN — DEXMEDETOMIDINE 0.4 MCG/KG/HR: 100 INJECTION, SOLUTION, CONCENTRATE INTRAVENOUS at 08:47

## 2017-04-01 RX ADMIN — POTASSIUM CHLORIDE 20 MEQ: 29.8 INJECTION, SOLUTION INTRAVENOUS at 19:58

## 2017-04-01 RX ADMIN — FENTANYL CITRATE 100 MCG: 50 INJECTION, SOLUTION INTRAMUSCULAR; INTRAVENOUS at 08:32

## 2017-04-01 RX ADMIN — HEPARIN SODIUM 500 UNITS/HR: 10000 INJECTION, SOLUTION INTRAVENOUS at 14:21

## 2017-04-01 RX ADMIN — Medication 2 MG/HR: at 00:23

## 2017-04-01 RX ADMIN — NITROGLYCERIN 2 MCG/KG/MIN: 20 INJECTION INTRAVENOUS at 08:32

## 2017-04-01 RX ADMIN — AMPICILLIN SODIUM AND SULBACTAM SODIUM 3 G: 2; 1 INJECTION, POWDER, FOR SOLUTION INTRAMUSCULAR; INTRAVENOUS at 01:39

## 2017-04-01 RX ADMIN — POTASSIUM CHLORIDE 20 MEQ: 29.8 INJECTION, SOLUTION INTRAVENOUS at 13:17

## 2017-04-01 RX ADMIN — CHLOROTHIAZIDE 1000 MG: 250 SUSPENSION ORAL at 17:11

## 2017-04-01 RX ADMIN — POTASSIUM CHLORIDE 20 MEQ: 29.8 INJECTION, SOLUTION INTRAVENOUS at 04:15

## 2017-04-01 RX ADMIN — HUMAN INSULIN 2 UNITS/HR: 100 INJECTION, SOLUTION SUBCUTANEOUS at 05:07

## 2017-04-01 RX ADMIN — FENTANYL CITRATE 50 MCG: 50 INJECTION, SOLUTION INTRAMUSCULAR; INTRAVENOUS at 23:00

## 2017-04-01 RX ADMIN — HYDRALAZINE HYDROCHLORIDE 20 MG: 20 INJECTION INTRAMUSCULAR; INTRAVENOUS at 09:37

## 2017-04-01 RX ADMIN — FENTANYL CITRATE 100 MCG: 50 INJECTION, SOLUTION INTRAMUSCULAR; INTRAVENOUS at 06:33

## 2017-04-01 RX ADMIN — FENTANYL CITRATE 100 MCG: 50 INJECTION, SOLUTION INTRAMUSCULAR; INTRAVENOUS at 13:51

## 2017-04-01 RX ADMIN — Medication 2 MG/HR: at 13:18

## 2017-04-01 RX ADMIN — POTASSIUM CHLORIDE 20 MEQ: 29.8 INJECTION, SOLUTION INTRAVENOUS at 02:37

## 2017-04-01 RX ADMIN — HUMAN INSULIN 8 UNITS/HR: 100 INJECTION, SOLUTION SUBCUTANEOUS at 18:52

## 2017-04-01 RX ADMIN — POTASSIUM CHLORIDE 20 MEQ: 29.8 INJECTION, SOLUTION INTRAVENOUS at 14:26

## 2017-04-01 RX ADMIN — FENTANYL CITRATE 100 MCG: 50 INJECTION, SOLUTION INTRAMUSCULAR; INTRAVENOUS at 09:30

## 2017-04-01 RX ADMIN — CLOPIDOGREL 75 MG: 75 TABLET, FILM COATED ORAL at 08:33

## 2017-04-01 RX ADMIN — FENTANYL CITRATE 50 MCG: 50 INJECTION, SOLUTION INTRAMUSCULAR; INTRAVENOUS at 21:55

## 2017-04-01 RX ADMIN — POTASSIUM CHLORIDE 20 MEQ: 29.8 INJECTION, SOLUTION INTRAVENOUS at 18:35

## 2017-04-01 RX ADMIN — FENTANYL CITRATE 100 MCG/HR: 50 INJECTION, SOLUTION INTRAMUSCULAR; INTRAVENOUS at 20:05

## 2017-04-01 RX ADMIN — AMPICILLIN SODIUM AND SULBACTAM SODIUM 3 G: 2; 1 INJECTION, POWDER, FOR SOLUTION INTRAMUSCULAR; INTRAVENOUS at 21:12

## 2017-04-01 RX ADMIN — POTASSIUM CHLORIDE 20 MEQ: 29.8 INJECTION, SOLUTION INTRAVENOUS at 05:07

## 2017-04-01 RX ADMIN — PANTOPRAZOLE SODIUM 40 MG: 40 TABLET, DELAYED RELEASE ORAL at 08:34

## 2017-04-01 RX ADMIN — DOBUTAMINE HYDROCHLORIDE 2.5 MCG/KG/MIN: 200 INJECTION INTRAVENOUS at 20:08

## 2017-04-01 RX ADMIN — PANTOPRAZOLE SODIUM 40 MG: 40 TABLET, DELAYED RELEASE ORAL at 19:53

## 2017-04-01 RX ADMIN — HUMAN INSULIN 11 UNITS/HR: 100 INJECTION, SOLUTION SUBCUTANEOUS at 16:01

## 2017-04-01 RX ADMIN — FENTANYL CITRATE 100 MCG: 50 INJECTION, SOLUTION INTRAMUSCULAR; INTRAVENOUS at 18:29

## 2017-04-01 RX ADMIN — FENTANYL CITRATE 75 MCG: 50 INJECTION, SOLUTION INTRAMUSCULAR; INTRAVENOUS at 03:16

## 2017-04-01 RX ADMIN — HUMAN INSULIN 10 UNITS/HR: 100 INJECTION, SOLUTION SUBCUTANEOUS at 01:32

## 2017-04-01 RX ADMIN — POTASSIUM CHLORIDE 20 MEQ: 29.8 INJECTION, SOLUTION INTRAVENOUS at 21:12

## 2017-04-01 RX ADMIN — HUMAN INSULIN 6 UNITS/HR: 100 INJECTION, SOLUTION SUBCUTANEOUS at 12:13

## 2017-04-01 NOTE — PLAN OF CARE
Problem: Goal Outcome Summary  Goal: Goal Outcome Summary  Outcome: No Change  Patient follows commands and moves all extremities. Lung sounds clear, diminished in the bases. Small amt of white creamy sputum. Tmax 99. -120bpm; increases to 150bpm with agitation or pain. MAPs . BP also increase to MAPs 115 with pain/agitation. Hydralazine 20mg given once. Patient had chest pain this AM; MDs aware. Went to cath lab. Administered PRN boluses of fentanyl for pain.  At 13:50 patient was extremely agitated and restless requiring multiple RNs to hold flat (d/t IABP). Patient received 3mg versed and 150mcg fentanyl.  Xray ordered to confirm placement of devices. CVP 12, 16, 18 (administered diuril 1000mg once for high cvp). PA pressures 38/28, 36/26, 34/26. SVO2 57, 62, 56. CO 4-4.9, CI 2.4-3. SVR 1831-4439. IABP not weaned today. Ratio 1:1, 100% augmentation. Pulses present. UOP 95-225ml/hr. Tube feeds at goal of 50ml/hr. Free water flush 50ml q 1 hr.  Removed rectal tube today. Replaced K+ per protocol. Heparin at 500units/hr. Nitro at 2mcg/kg/hr. Dobutamine at 2.5mcg/kg/hr. Precedex at 0.9mcg/kg/hr. Fentanyl at 100mcg/hr. Insulin at 6-11units/hr. Family updated. Will continue to monitor and notify MD of any changes.      Simona Richardson RN

## 2017-04-01 NOTE — PROCEDURES
PRELIMINARY CARDIAC CATH REPORT:     PROCEDURES PERFORMED:   1. Selective left and right coronary angiography.  2. Sedation directed by the interventional cardiologist for total time of 11 minutes.    PHYSICIANS:  1. Attending Interventional Cardiology Staff: Jose Garner MD  2. Interventional Cardiology Fellow: Andrew Norwood MD      INDICATION:  Luke Henao is a 51 year old male with cardiogenic shock following an inferior STEMI who returns for coronary angiography to assess ECG changes.    DESCRIPTION:  1. Consent obtained from family with discussion of risks.  All questions were answered.  2. Sterile prep and procedure.  3. Location: left common femoral artery.  4. Access: Local anesthetic with lidocaine.  A standard (18 g) needle with ultrasound guidance was used to establish vascular access using a modified Seldinger technique.  5. Sheath: 4Fr long sheath.  6. Catheters: 4Fr 3DRC, 4Fr JL-4.  7. Fluoroscopy time of 1.0 min.  8. Estimated blood loss of <5 mL.  9. See below for procedure details.    MEDICATIONS:  1. Contrast 10 mL IV.  2. Conscious sedation with fentanyl 100 mcg/hr for total of 11 minutes as directed by the attending cardiologist.    HEMODYNAMICS:  1.  bpm  2. Ao BP 94/66/72 mmHg    CORONARY ANGIOGRAM:   1. Both coronary arteries arise from their respective cusps.  2. Right dominant.  3. LM has mild luminal irregularities.   4. LAD supplies the apex along with the RCA (type 2 LAD) and gives rise to septal perforators and a large and branching D1.  There are widely patent stents extending from the proximal to mid LAD.  The dLAD has mild to moderate disease to 30% stenosis.  The D1 is jailed by the LAD stents, but has EBER 3 flow with disease to 30% stenosis.     5. The small ramus is no longer present.  6. LCX is occluded at the ostium.    7. RCA gives rise to PL branches and supplies PDA. There are widely patent stents extending from the proximal to mid RCA.  The remainder of the mRCA  has disease to 50% stenosis and the dRCA has disease to 10% stenosis.  The RPDA has a widely patent stent and the remainder of the artery has mild disease to 10% stenosis.  The RPLA has small vessels with diffuse disease including a distal branch occlusion.  The RPLA supplies some small collateral branches to the dLCx.    COMPLICATIONS:  1. None    SUMMARY:   1. Cardiogenic shock following an inferior STEMI.  2. The LCx re-occlusion is not surprising as the recently revascularized LCx  had poor outflow and the revascularized portion did not supply a large territory (limited to no flow was present distal to the stented segment immediately following stenting).  Repeat revascularization of the LCx would result in the same outcome of repeat closure and also risk embolizing thrombus from the LCx into the LAD so no treatment of the occluded LCx was performed.   3. Three vessel coronary artery disease with patent LAD and RCA stents and an occluded LCx.    PLAN:   1. Aspirin 81 mg po daily lifelong.  2. Plavix 75 mg po daily for at least 1 year uninterrupted.  3. Return to the primary inpatient team for further evaluation and management.    The attending interventional cardiologist was present for the entire procedure.    Findings discussed with the primary inpatient cardiology team.    See CVIS report for final draft.    Andrew Norwood M.D.  Structural Heart Disease &   Advanced Interventional Cardiology Fellow  Pager [phone]

## 2017-04-01 NOTE — PLAN OF CARE
"Problem: Goal Outcome Summary  Goal: Goal Outcome Summary  Outcome: Improving     D/I: Pt remains ST 100s-110s. IABP at 1:1 with 100% augmentation. Discussed possible plan to wean frequency with cardiology team: decision made to not wean balloon overnight. Assisted pressures running 70s-100s/50s-70s with MAP 60s-80s and augmenting 90s-140s. When pump pauses to recalibrate itself, native pressures running 100s-70s with MAP 80s. CVP 12-14, PA 30s/20s. [place] repositioned by MD last night after AM CXR showed swan too distal. SvO2 54-60 with donna CI 2.3-2.9. SVR ~1300-~1600. Nitro drip titrated up slightly overnight. Remains on fixed-rate doBUTamine. Afebrile.      Occasionally overbreathes vent for RR teens-20s.      Sedated on low doses precedex and fentanyl. Becoming more alert as the night goes on. At present, will open eyes and DEL RIO spontaneously. Follows commands in UE and LE and will nod/shake head appropriately. COOKIE. Eyes track and appear to focus on speaker. Nods \"yes\" when asked if having pain. Fentanyl boluses being given ~hourly. Remains restrained as pt will thrash arms about when agitated.      Remains on bumex gtt. Total 1300cc UO since midnight. Minimal stool in rectal tube. TF restarted at 2200 last evening after clarifying plan with MDs. Advanced to goal at 0600. Clarified free water order with MDs: continue with 50cc free water per hour. OG with intermittent faint blood-tinged output. Suction on tube decreased as much as possible.      AM labs noted: replacing potassium frequently throughout the night. Per cardiology team, given one additional dose of replacement in addition to what the protocol calls for. Continues on insulin gtt, titrating per RN discretion. BG running 100s-180s. Clarified heparin order with MD team: on flat rate heparin overnight.      Many family/friends here last evening. Educated re: pt status and what to expect in the next few days. They verbalize understanding.      A/P: Pt " hemodynamically stable tolerating current plan of care. Continue to watch potassium levels. Will report off to oncoming nurse.      -Airam Javed RN 4/1/2017 6:46 AM

## 2017-04-01 NOTE — PROGRESS NOTES
"[facility]  Cardiology I Service  Daily Note  4/1/2017      Interval History:   This is a 51 year old male smoker with diabetes who was transferred from [facility] in cardiogenic shock after sustaining [id number] wall STEMI.   He was able to get through the PCI if the RCA, LAD, and CFx system here without ECMO . Currently improving with less inotropic support.     Event over the past 24 hours:  No balloon weaning   Following command in UE /  LE     Hemodynamics:   - 122  /60 (MAP 86)    CVP: 12 , PA: 36/28,  Mean PA: 31,  PCW: 25,  CO/CI: 4.2/2.5,  SVR: 1121,SVO2 57     IABP via Right FA, I: 1:1 ,augmented MAPs 110  L radial and distal pulse +2     INs and outs:  3/31/17   In 3.88 - Out 6.8 - Net -2.9  In 1.6 - Out 1.8 - Net -200cc      Vitals:    03/30/17 0215 03/31/17 0400 04/01/17 0300   Weight: 62.2 kg (137 lb 2 oz) 65.5 kg (144 lb 6.4 oz) 62.5 kg (137 lb 12.6 oz)       Pertinent Medications:  Drips:   Bumex 2mg/hr  Dobutamine 2.5  Nitroglycerine 2 mg/hr    Precedex 0.2  Heparin   Fentanyl 50/hr  Insulin     Other Meds:  Ampicillin 3 g q8h  Aspirin 81  Plavix 75 mg   Pantoprazole 40 BID  Cortef 50 BID?   Klor-Con 60 meq     Examination:  /64  Temp 98.5  F (36.9  C) (Oral)  Resp 12  Ht 1.575 m (5' 2.01\")  Wt 62.5 kg (137 lb 12.6 oz)  SpO2 98%  BMI 25.2 kg/m2      GEN: Intubated , mechanically ventilated , RASS -3   NECK: Supple, no LAD, JVP not elevated , swan in place   RESP: coarse inspiratory crackles bilaterally   CV: Regular, tachy, S1 and S2 heard  ABD: Soft, non distented ,+BS, no guarding  EXT: No peripheral edema, warm/well perfused   NEURO: RASS -3 , moves extremities     Data:  CMP  Recent Labs  Lab 04/01/17  0622 04/01/17  0322 04/01/17  0012 03/31/17  1923 03/31/17  1614  03/31/17  0321  03/30/17  2000  03/30/17  1553  03/30/17  0422  03/29/17 0326   NA  --  146* 146*  --  145*  --  147*  --   --   --  148*  --  150*  < > 154*   POTASSIUM " 3.6 3.5  3.5 3.0* 3.0* 3.0*  3.1*  < > 3.7  < >  --   < > 3.9  3.9  < > 3.8  < > 3.4  3.3*   CHLORIDE  --  103  --   --  102  --  107  --   --   --  111*  --  112*  < > 114*   CO2  --  32  --   --  30  --  28  --   --   --  27  --  26  < > 28   ANIONGAP  --  10  --   --  12  --  11  --   --   --  10  --  12  < > 12   GLC  --  128*  --   --  125*  --  127*  --   --   --  138*  --  183*  < > 160*   BUN  --  67*  --   --  65*  --  63*  --   --   --  57*  --  49*  < > 35*   CR  --  3.38*  --   --  3.56*  --  3.49*  --   --   --  3.11*  --  2.85*  < > 2.04*   GFRESTIMATED  --  19*  --   --  18*  --  19*  --   --   --  21*  --  24*  < > 35*   GFRESTBLACK  --  24*  --   --  22*  --  23*  --   --   --  26*  --  29*  < > 42*   ROB  --  7.6*  --   --  7.7*  --  7.1*  --   --   --  7.0*  --  7.8*  < > 7.4*   MAG  --  2.3  --  2.2 2.3  --  2.3  --  2.3  --  2.2  --  2.3  < > 2.5*   PHOS  --  2.9  --  4.1  --   --  4.1  --  4.4  --   --   --  3.7  < > 1.5*   PROTTOTAL  --  5.9*  --   --   --   --  5.6*  --   --   --   --   --  5.5*  --  5.5*   ALBUMIN  --  2.5*  --   --   --   --  2.4*  --   --   --   --   --  2.6*  --  2.6*   BILITOTAL  --  1.0  --   --   --   --  0.8  --   --   --   --   --  1.1  --  1.3   ALKPHOS  --  74  --   --   --   --  65  --   --   --   --   --  62  --  56   AST  --  599*  --   --   --   --  1233*  --   --   --   --   --  2562*  --  4442*   ALT  --  1990*  --   --   --   --  2898*  --   --   --   --   --  3721*  --  4152*   < > = values in this interval not displayed.  CBC  Recent Labs  Lab 04/01/17 0322 03/31/17  1923 03/31/17 0321 03/30/17  2309 03/30/17  1203   WBC 15.8* 14.9* 14.5*  --  15.0*   RBC 2.99* 2.95* 2.97*  --  3.05*   HGB 8.8* 8.9* 9.0* 8.6* 9.2*   HCT 27.5* 27.0* 27.2*  --  28.2*   MCV 92 92 92  --  93   MCH 29.4 30.2 30.3  --  30.2   MCHC 32.0 33.0 33.1  --  32.6   RDW 13.8 13.8 13.9  --  14.2   * 118* 91*  --  82*     INR  Recent Labs  Lab 04/01/17 0322 03/31/17 0321  03/30/17  0422 03/29/17  1939   INR 1.37* 1.48* 1.73* 1.92*     Arterial Blood Gas  Recent Labs  Lab 04/01/17  0322 04/01/17  0012 03/31/17  1923 03/31/17  1614  03/28/17  0057  03/27/17  2300  03/27/17  2055 03/27/17 1951   PH  --   --   --   --   --  7.33*  --  7.21*  --  7.10* 7.08*   PCO2  --   --   --   --   --  37  --  33*  --  46* 47*   PO2  --   --   --   --   --  103  --  297*  --  58* 51*   HCO3  --   --   --   --   --  20*  --  13*  --  14* 14*   O2PER 40 40 40 40  < > 40%  < > 100  < > 100.0 40.0   < > = values in this interval not displayed.    Imaging Studies:  Radiology   No inflitrates - [place] too far     Assessment and Plan  This is a 51 year old male smoker with diabetes who was transferred from [facility] in cardiogenic shock after sustaining [id number] wall STEMI.   He was able to get through the PCI if the RCA, LAD, and CFx system here without ECMO . Currently improving with less inotropic support and IABP.     PLAN:  Today:  - Angiography     ===NEURO===  # Neuro  Able to move all extremities , follows command.     # Sedation  Precedex 0.2 - 1.2  Fentanyl 50 mcg/hr   ===CARDIOVASCULAR===  # Mixed shock: Cardiogenic from biventricular failure from inferior STEMI and distributive shock from post-MI SIRS response vs aspiration pneumonia/pneumonitis from possible aspiration during cath at [facility] on 3/26  # Due to inferior wall STEMI. S/P 7 stents to LAD, circ, RCA (angiogram report pending). Now with IABP, temporary pacemaker after 3rd deg heart block in cath lab at [facility] on 3/26   # Small pericardial hemorrhage    EKG showing worsening ST depression this AM - will perform Angiography to r/o restonosis of prior placed stents.  Continue IABP for now . Hydralazine IV for afterload reduction.     - Continue IABP for coronary perfusion  -temp pacer removed 3/29  - Dual antiplatelet therapy with ASA 81mg and plavix 75mg qday  - Will adjust plan according to hemodynamics and further test  results.   - Hydralazine 20 mg IV once for afterload reduction  -eventually will start statin      ===PULMONARY===  #Intubated for airway protection due to mental status and emesis on 3/26.   #Probable Aspiration PNA/pneumonitis  #Small mediastinal hemorrhage  Continue Unasyn.     #Vent settings  Fio2 40% -  - RR 12 - PEEP 5      ===GASTROINTESTINAL===  Minimal pneumoperitoneum, unclear etiology: Seen by General surgery.   -Conservative Mx for now     Shock liver injury with coagulopathy-improving trend LFTs,   -Vit K 2.5mg 3/28, Vit K 3/29    # Nutrition:   Tube feeds   ===RENAL===  # Renal/  Acute kidney injury-nonoliguric: multifactorial: hypoperfusion, contrast etc.  - Daily Cr  - Avoid nephrotoxins as able  - Chavis placed 3/26     -Hypernatremia- free water     ===HEME/ONC===  #Hem  -thrombocytopenia-likely from balloon pump  -Anemia- multifactorial    ===ENDOCRINE===  #T2DM: HA1C 7.5 yesterday on admission. BG climibing to 170s since admission. Will likely continue to rise with steroids.   - Insulin gtt per nurising protocol with hypoglycemia precautions.       ===INFECTIOUS DISEASE===    #Sepsis from aspiration pneumonia/pneumonitis vs MRSA pneumonia vs post-MI SIRS response    - repeat cultures if febrile  - Unasyn 3gm q12h   - Hydrocortisone 50mg BID for BP support      ===SKIN/MSK===  No active issues , continue to monitor     FEN: feeding tube  Code: FULL  PPx: PPI 40mg IV BID, senna PRN  Dispo: pending stability    Patient was seen and discussed with attending physician Dr. Talon Arzate MD  Internal Medicine, PGY-1  Pager [phone]

## 2017-04-02 ENCOUNTER — APPOINTMENT (OUTPATIENT)
Dept: GENERAL RADIOLOGY | Facility: CLINIC | Age: 52
DRG: 228 | End: 2017-04-02
Attending: INTERNAL MEDICINE
Payer: COMMERCIAL

## 2017-04-02 LAB
ALBUMIN SERPL-MCNC: 2.6 G/DL (ref 3.4–5)
ALP SERPL-CCNC: 68 U/L (ref 40–150)
ALT SERPL W P-5'-P-CCNC: 1396 U/L (ref 0–70)
ANION GAP SERPL CALCULATED.3IONS-SCNC: 10 MMOL/L (ref 3–14)
ANION GAP SERPL CALCULATED.3IONS-SCNC: 11 MMOL/L (ref 3–14)
APTT PPP: 44 SEC (ref 22–37)
AST SERPL W P-5'-P-CCNC: 284 U/L (ref 0–45)
BACTERIA SPEC CULT: NO GROWTH
BACTERIA SPEC CULT: NO GROWTH
BASE EXCESS BLDV CALC-SCNC: 10.1 MMOL/L
BASE EXCESS BLDV CALC-SCNC: 10.2 MMOL/L
BASE EXCESS BLDV CALC-SCNC: 11.5 MMOL/L
BASE EXCESS BLDV CALC-SCNC: 11.7 MMOL/L
BILIRUB DIRECT SERPL-MCNC: 0.2 MG/DL (ref 0–0.2)
BILIRUB SERPL-MCNC: 0.7 MG/DL (ref 0.2–1.3)
BUN SERPL-MCNC: 50 MG/DL (ref 7–30)
BUN SERPL-MCNC: 56 MG/DL (ref 7–30)
CA-I BLD-MCNC: 4.2 MG/DL (ref 4.4–5.2)
CALCIUM SERPL-MCNC: 7.6 MG/DL (ref 8.5–10.1)
CALCIUM SERPL-MCNC: 7.9 MG/DL (ref 8.5–10.1)
CHLORIDE SERPL-SCNC: 104 MMOL/L (ref 94–109)
CHLORIDE SERPL-SCNC: 107 MMOL/L (ref 94–109)
CO2 SERPL-SCNC: 31 MMOL/L (ref 20–32)
CO2 SERPL-SCNC: 33 MMOL/L (ref 20–32)
CREAT SERPL-MCNC: 1.95 MG/DL (ref 0.66–1.25)
CREAT SERPL-MCNC: 2.41 MG/DL (ref 0.66–1.25)
ERYTHROCYTE [DISTWIDTH] IN BLOOD BY AUTOMATED COUNT: 14.5 % (ref 10–15)
GFR SERPL CREATININE-BSD FRML MDRD: 29 ML/MIN/1.7M2
GFR SERPL CREATININE-BSD FRML MDRD: 37 ML/MIN/1.7M2
GLUCOSE BLDC GLUCOMTR-MCNC: 103 MG/DL (ref 70–99)
GLUCOSE BLDC GLUCOMTR-MCNC: 113 MG/DL (ref 70–99)
GLUCOSE BLDC GLUCOMTR-MCNC: 116 MG/DL (ref 70–99)
GLUCOSE BLDC GLUCOMTR-MCNC: 122 MG/DL (ref 70–99)
GLUCOSE BLDC GLUCOMTR-MCNC: 125 MG/DL (ref 70–99)
GLUCOSE BLDC GLUCOMTR-MCNC: 128 MG/DL (ref 70–99)
GLUCOSE BLDC GLUCOMTR-MCNC: 128 MG/DL (ref 70–99)
GLUCOSE BLDC GLUCOMTR-MCNC: 129 MG/DL (ref 70–99)
GLUCOSE BLDC GLUCOMTR-MCNC: 129 MG/DL (ref 70–99)
GLUCOSE BLDC GLUCOMTR-MCNC: 130 MG/DL (ref 70–99)
GLUCOSE BLDC GLUCOMTR-MCNC: 134 MG/DL (ref 70–99)
GLUCOSE BLDC GLUCOMTR-MCNC: 135 MG/DL (ref 70–99)
GLUCOSE BLDC GLUCOMTR-MCNC: 136 MG/DL (ref 70–99)
GLUCOSE BLDC GLUCOMTR-MCNC: 136 MG/DL (ref 70–99)
GLUCOSE BLDC GLUCOMTR-MCNC: 142 MG/DL (ref 70–99)
GLUCOSE BLDC GLUCOMTR-MCNC: 144 MG/DL (ref 70–99)
GLUCOSE BLDC GLUCOMTR-MCNC: 150 MG/DL (ref 70–99)
GLUCOSE BLDC GLUCOMTR-MCNC: 150 MG/DL (ref 70–99)
GLUCOSE BLDC GLUCOMTR-MCNC: 151 MG/DL (ref 70–99)
GLUCOSE BLDC GLUCOMTR-MCNC: 159 MG/DL (ref 70–99)
GLUCOSE SERPL-MCNC: 147 MG/DL (ref 70–99)
GLUCOSE SERPL-MCNC: 162 MG/DL (ref 70–99)
HCO3 BLDV-SCNC: 34 MMOL/L (ref 21–28)
HCO3 BLDV-SCNC: 35 MMOL/L (ref 21–28)
HCO3 BLDV-SCNC: 36 MMOL/L (ref 21–28)
HCT VFR BLD AUTO: 29.4 % (ref 40–53)
HGB BLD-MCNC: 9.3 G/DL (ref 13.3–17.7)
INR PPP: 1.18 (ref 0.86–1.14)
KCT BLD-ACNC: 169 SEC (ref 105–167)
LACTATE BLD-SCNC: 1.2 MMOL/L (ref 0.7–2.1)
LACTATE BLD-SCNC: 1.3 MMOL/L (ref 0.7–2.1)
LACTATE BLD-SCNC: 1.6 MMOL/L (ref 0.7–2.1)
LACTATE BLD-SCNC: 1.6 MMOL/L (ref 0.7–2.1)
LMWH PPP CHRO-ACNC: 0.15 IU/ML
Lab: NORMAL
MAGNESIUM SERPL-MCNC: 2.3 MG/DL (ref 1.6–2.3)
MAGNESIUM SERPL-MCNC: 2.4 MG/DL (ref 1.6–2.3)
MCH RBC QN AUTO: 29.9 PG (ref 26.5–33)
MCHC RBC AUTO-ENTMCNC: 31.6 G/DL (ref 31.5–36.5)
MCV RBC AUTO: 95 FL (ref 78–100)
MICRO REPORT STATUS: NORMAL
MICRO REPORT STATUS: NORMAL
O2/TOTAL GAS SETTING VFR VENT: 40 %
OXYHGB MFR BLDV: 53 %
OXYHGB MFR BLDV: 55 %
OXYHGB MFR BLDV: 56 %
OXYHGB MFR BLDV: 56 %
OXYHGB MFR BLDV: 59 %
OXYHGB MFR BLDV: 59 %
PCO2 BLDV: 43 MM HG (ref 40–50)
PCO2 BLDV: 46 MM HG (ref 40–50)
PCO2 BLDV: 47 MM HG (ref 40–50)
PCO2 BLDV: 48 MM HG (ref 40–50)
PCO2 BLDV: 50 MM HG (ref 40–50)
PCO2 BLDV: 51 MM HG (ref 40–50)
PH BLDV: 7.45 PH (ref 7.32–7.43)
PH BLDV: 7.47 PH (ref 7.32–7.43)
PH BLDV: 7.49 PH (ref 7.32–7.43)
PH BLDV: 7.49 PH (ref 7.32–7.43)
PH BLDV: 7.5 PH (ref 7.32–7.43)
PH BLDV: 7.51 PH (ref 7.32–7.43)
PHOSPHATE SERPL-MCNC: 3.2 MG/DL (ref 2.5–4.5)
PLATELET # BLD AUTO: 117 10E9/L (ref 150–450)
PO2 BLDV: 32 MM HG (ref 25–47)
POTASSIUM BLD-SCNC: 2.9 MMOL/L (ref 3.4–5.3)
POTASSIUM BLD-SCNC: 3.2 MMOL/L (ref 3.4–5.3)
POTASSIUM BLD-SCNC: 3.3 MMOL/L (ref 3.4–5.3)
POTASSIUM BLD-SCNC: 3.5 MMOL/L (ref 3.4–5.3)
POTASSIUM SERPL-SCNC: 3.2 MMOL/L (ref 3.4–5.3)
POTASSIUM SERPL-SCNC: 3.4 MMOL/L (ref 3.4–5.3)
POTASSIUM SERPL-SCNC: 3.6 MMOL/L (ref 3.4–5.3)
PROT SERPL-MCNC: 6.1 G/DL (ref 6.8–8.8)
RBC # BLD AUTO: 3.11 10E12/L (ref 4.4–5.9)
SODIUM SERPL-SCNC: 146 MMOL/L (ref 133–144)
SODIUM SERPL-SCNC: 149 MMOL/L (ref 133–144)
SPECIMEN SOURCE: NORMAL
SPECIMEN SOURCE: NORMAL
WBC # BLD AUTO: 15.5 10E9/L (ref 4–11)

## 2017-04-02 PROCEDURE — 93005 ELECTROCARDIOGRAM TRACING: CPT

## 2017-04-02 PROCEDURE — 25000125 ZZHC RX 250: Performed by: INTERNAL MEDICINE

## 2017-04-02 PROCEDURE — 80048 BASIC METABOLIC PNL TOTAL CA: CPT | Performed by: INTERNAL MEDICINE

## 2017-04-02 PROCEDURE — 83735 ASSAY OF MAGNESIUM: CPT | Performed by: INTERNAL MEDICINE

## 2017-04-02 PROCEDURE — 82805 BLOOD GASES W/O2 SATURATION: CPT | Performed by: INTERNAL MEDICINE

## 2017-04-02 PROCEDURE — 25000132 ZZH RX MED GY IP 250 OP 250 PS 637: Performed by: INTERNAL MEDICINE

## 2017-04-02 PROCEDURE — 40000275 ZZH STATISTIC RCP TIME EA 10 MIN

## 2017-04-02 PROCEDURE — 85520 HEPARIN ASSAY: CPT | Performed by: INTERNAL MEDICINE

## 2017-04-02 PROCEDURE — 94003 VENT MGMT INPAT SUBQ DAY: CPT

## 2017-04-02 PROCEDURE — 36620 INSERTION CATHETER ARTERY: CPT

## 2017-04-02 PROCEDURE — 71010 XR CHEST PORT 1 VW: CPT

## 2017-04-02 PROCEDURE — 82248 BILIRUBIN DIRECT: CPT | Performed by: INTERNAL MEDICINE

## 2017-04-02 PROCEDURE — 83605 ASSAY OF LACTIC ACID: CPT | Performed by: INTERNAL MEDICINE

## 2017-04-02 PROCEDURE — 40000076 ZZH STATISTIC IABP MONITORING

## 2017-04-02 PROCEDURE — 20000004 ZZH R&B ICU UMMC

## 2017-04-02 PROCEDURE — 84132 ASSAY OF SERUM POTASSIUM: CPT | Performed by: INTERNAL MEDICINE

## 2017-04-02 PROCEDURE — 25000128 H RX IP 250 OP 636: Performed by: STUDENT IN AN ORGANIZED HEALTH CARE EDUCATION/TRAINING PROGRAM

## 2017-04-02 PROCEDURE — 85610 PROTHROMBIN TIME: CPT | Performed by: INTERNAL MEDICINE

## 2017-04-02 PROCEDURE — 00000146 ZZHCL STATISTIC GLUCOSE BY METER IP

## 2017-04-02 PROCEDURE — 25000128 H RX IP 250 OP 636: Performed by: INTERNAL MEDICINE

## 2017-04-02 PROCEDURE — 85730 THROMBOPLASTIN TIME PARTIAL: CPT | Performed by: INTERNAL MEDICINE

## 2017-04-02 PROCEDURE — S0171 BUMETANIDE 0.5 MG: HCPCS | Performed by: INTERNAL MEDICINE

## 2017-04-02 PROCEDURE — 84100 ASSAY OF PHOSPHORUS: CPT | Performed by: INTERNAL MEDICINE

## 2017-04-02 PROCEDURE — 33968 REMOVE AORTIC ASSIST DEVICE: CPT

## 2017-04-02 PROCEDURE — 99291 CRITICAL CARE FIRST HOUR: CPT | Mod: GC | Performed by: INTERNAL MEDICINE

## 2017-04-02 PROCEDURE — 80053 COMPREHEN METABOLIC PANEL: CPT | Performed by: INTERNAL MEDICINE

## 2017-04-02 PROCEDURE — 40000196 ZZH STATISTIC RAPCV CVP MONITORING

## 2017-04-02 PROCEDURE — 25000128 H RX IP 250 OP 636

## 2017-04-02 PROCEDURE — 85027 COMPLETE CBC AUTOMATED: CPT | Performed by: INTERNAL MEDICINE

## 2017-04-02 PROCEDURE — 82330 ASSAY OF CALCIUM: CPT | Performed by: INTERNAL MEDICINE

## 2017-04-02 PROCEDURE — 27210437 ZZH NUTRITION PRODUCT SEMIELEM INTERMED LITER

## 2017-04-02 PROCEDURE — 25000125 ZZHC RX 250: Performed by: STUDENT IN AN ORGANIZED HEALTH CARE EDUCATION/TRAINING PROGRAM

## 2017-04-02 PROCEDURE — 27210136 ZZH KIT CATH ARTERIAL EXT SUPPLY

## 2017-04-02 PROCEDURE — 85347 COAGULATION TIME ACTIVATED: CPT

## 2017-04-02 PROCEDURE — 25000132 ZZH RX MED GY IP 250 OP 250 PS 637

## 2017-04-02 PROCEDURE — 93010 ELECTROCARDIOGRAM REPORT: CPT | Performed by: INTERNAL MEDICINE

## 2017-04-02 PROCEDURE — 40000048 ZZH STATISTIC DAILY SWAN MONITORING

## 2017-04-02 RX ORDER — SODIUM CHLORIDE 9 MG/ML
INJECTION, SOLUTION INTRAVENOUS
Status: COMPLETED
Start: 2017-04-02 | End: 2017-04-02

## 2017-04-02 RX ORDER — HEPARIN SODIUM 5000 [USP'U]/.5ML
5000 INJECTION, SOLUTION INTRAVENOUS; SUBCUTANEOUS 2 TIMES DAILY
Status: DISCONTINUED | OUTPATIENT
Start: 2017-04-02 | End: 2017-04-05

## 2017-04-02 RX ADMIN — CLOPIDOGREL 75 MG: 75 TABLET, FILM COATED ORAL at 08:05

## 2017-04-02 RX ADMIN — PANTOPRAZOLE SODIUM 40 MG: 40 TABLET, DELAYED RELEASE ORAL at 20:21

## 2017-04-02 RX ADMIN — POTASSIUM CHLORIDE 20 MEQ: 29.8 INJECTION, SOLUTION INTRAVENOUS at 09:45

## 2017-04-02 RX ADMIN — DEXMEDETOMIDINE 1 MCG/KG/HR: 100 INJECTION, SOLUTION, CONCENTRATE INTRAVENOUS at 20:42

## 2017-04-02 RX ADMIN — HUMAN INSULIN 8 UNITS/HR: 100 INJECTION, SOLUTION SUBCUTANEOUS at 13:21

## 2017-04-02 RX ADMIN — FENTANYL CITRATE 100 MCG: 50 INJECTION, SOLUTION INTRAMUSCULAR; INTRAVENOUS at 11:30

## 2017-04-02 RX ADMIN — FENTANYL CITRATE 100 MCG/HR: 50 INJECTION, SOLUTION INTRAMUSCULAR; INTRAVENOUS at 15:16

## 2017-04-02 RX ADMIN — POTASSIUM CHLORIDE 20 MEQ: 29.8 INJECTION, SOLUTION INTRAVENOUS at 08:22

## 2017-04-02 RX ADMIN — FENTANYL CITRATE 50 MCG: 50 INJECTION, SOLUTION INTRAMUSCULAR; INTRAVENOUS at 01:12

## 2017-04-02 RX ADMIN — HUMAN INSULIN 8 UNITS/HR: 100 INJECTION, SOLUTION SUBCUTANEOUS at 06:00

## 2017-04-02 RX ADMIN — CALCIUM GLUCONATE 2 G: 94 INJECTION, SOLUTION INTRAVENOUS at 06:23

## 2017-04-02 RX ADMIN — POTASSIUM CHLORIDE 20 MEQ: 29.8 INJECTION, SOLUTION INTRAVENOUS at 00:50

## 2017-04-02 RX ADMIN — SODIUM CHLORIDE 50 MG: 9 INJECTION, SOLUTION INTRAVENOUS at 08:05

## 2017-04-02 RX ADMIN — MIDAZOLAM 2 MG: 1 INJECTION INTRAMUSCULAR; INTRAVENOUS at 20:20

## 2017-04-02 RX ADMIN — Medication 1 MG/HR: at 11:16

## 2017-04-02 RX ADMIN — FENTANYL CITRATE 50 MCG: 50 INJECTION, SOLUTION INTRAMUSCULAR; INTRAVENOUS at 06:22

## 2017-04-02 RX ADMIN — DEXMEDETOMIDINE 1.4 MCG/KG/HR: 100 INJECTION, SOLUTION, CONCENTRATE INTRAVENOUS at 14:45

## 2017-04-02 RX ADMIN — MIDAZOLAM 2 MG: 1 INJECTION INTRAMUSCULAR; INTRAVENOUS at 12:13

## 2017-04-02 RX ADMIN — POTASSIUM CHLORIDE 20 MEQ: 29.8 INJECTION, SOLUTION INTRAVENOUS at 13:28

## 2017-04-02 RX ADMIN — FENTANYL CITRATE 100 MCG: 50 INJECTION, SOLUTION INTRAMUSCULAR; INTRAVENOUS at 09:23

## 2017-04-02 RX ADMIN — NITROGLYCERIN 2 MCG/KG/MIN: 20 INJECTION INTRAVENOUS at 05:06

## 2017-04-02 RX ADMIN — PANTOPRAZOLE SODIUM 40 MG: 40 TABLET, DELAYED RELEASE ORAL at 08:05

## 2017-04-02 RX ADMIN — DEXMEDETOMIDINE 1 MCG/KG/HR: 100 INJECTION, SOLUTION, CONCENTRATE INTRAVENOUS at 08:07

## 2017-04-02 RX ADMIN — DEXMEDETOMIDINE 1.2 MCG/KG/HR: 100 INJECTION, SOLUTION, CONCENTRATE INTRAVENOUS at 01:55

## 2017-04-02 RX ADMIN — AMPICILLIN SODIUM AND SULBACTAM SODIUM 3 G: 2; 1 INJECTION, POWDER, FOR SOLUTION INTRAMUSCULAR; INTRAVENOUS at 10:32

## 2017-04-02 RX ADMIN — SENNOSIDES AND DOCUSATE SODIUM 2 TABLET: 8.6; 5 TABLET ORAL at 09:51

## 2017-04-02 RX ADMIN — POTASSIUM CHLORIDE 20 MEQ: 29.8 INJECTION, SOLUTION INTRAVENOUS at 20:20

## 2017-04-02 RX ADMIN — SODIUM CHLORIDE 1000 ML: 9 INJECTION, SOLUTION INTRAVENOUS at 20:20

## 2017-04-02 RX ADMIN — NITROGLYCERIN 2.5 MCG/KG/MIN: 20 INJECTION INTRAVENOUS at 12:10

## 2017-04-02 RX ADMIN — MIDAZOLAM 2 MG: 1 INJECTION INTRAMUSCULAR; INTRAVENOUS at 03:14

## 2017-04-02 RX ADMIN — MULTIVIT AND MINERALS-FERROUS GLUCONATE 9 MG IRON/15 ML ORAL LIQUID 15 ML: at 08:05

## 2017-04-02 RX ADMIN — SENNOSIDES AND DOCUSATE SODIUM 2 TABLET: 8.6; 5 TABLET ORAL at 20:20

## 2017-04-02 RX ADMIN — POTASSIUM CHLORIDE 20 MEQ: 29.8 INJECTION, SOLUTION INTRAVENOUS at 04:29

## 2017-04-02 RX ADMIN — HUMAN INSULIN 9 UNITS/HR: 100 INJECTION, SOLUTION SUBCUTANEOUS at 09:45

## 2017-04-02 RX ADMIN — MIDAZOLAM 2 MG: 1 INJECTION INTRAMUSCULAR; INTRAVENOUS at 23:39

## 2017-04-02 RX ADMIN — POTASSIUM CHLORIDE 20 MEQ: 29.8 INJECTION, SOLUTION INTRAVENOUS at 11:48

## 2017-04-02 RX ADMIN — FENTANYL CITRATE 50 MCG: 50 INJECTION, SOLUTION INTRAMUSCULAR; INTRAVENOUS at 08:00

## 2017-04-02 RX ADMIN — Medication 12.5 MG: at 16:00

## 2017-04-02 RX ADMIN — FENTANYL CITRATE 50 MCG: 50 INJECTION, SOLUTION INTRAMUSCULAR; INTRAVENOUS at 04:15

## 2017-04-02 RX ADMIN — POTASSIUM CHLORIDE 20 MEQ: 29.8 INJECTION, SOLUTION INTRAVENOUS at 15:47

## 2017-04-02 RX ADMIN — HEPARIN SODIUM 5000 UNITS: 5000 INJECTION, SOLUTION INTRAVENOUS; SUBCUTANEOUS at 20:20

## 2017-04-02 RX ADMIN — HUMAN INSULIN 8 UNITS/HR: 100 INJECTION, SOLUTION SUBCUTANEOUS at 03:00

## 2017-04-02 RX ADMIN — POTASSIUM CHLORIDE 20 MEQ: 29.8 INJECTION, SOLUTION INTRAVENOUS at 05:31

## 2017-04-02 RX ADMIN — POTASSIUM CHLORIDE 20 MEQ: 29.8 INJECTION, SOLUTION INTRAVENOUS at 01:26

## 2017-04-02 RX ADMIN — Medication 12.5 MG: at 20:21

## 2017-04-02 RX ADMIN — POTASSIUM CHLORIDE 20 MEQ: 29.8 INJECTION, SOLUTION INTRAVENOUS at 02:22

## 2017-04-02 RX ADMIN — ASPIRIN 81 MG CHEWABLE TABLET 81 MG: 81 TABLET CHEWABLE at 08:05

## 2017-04-02 RX ADMIN — FENTANYL CITRATE 100 MCG/HR: 50 INJECTION, SOLUTION INTRAMUSCULAR; INTRAVENOUS at 06:07

## 2017-04-02 RX ADMIN — AMPICILLIN SODIUM AND SULBACTAM SODIUM 3 G: 2; 1 INJECTION, POWDER, FOR SOLUTION INTRAMUSCULAR; INTRAVENOUS at 21:34

## 2017-04-02 RX ADMIN — MIDAZOLAM 2 MG: 1 INJECTION INTRAMUSCULAR; INTRAVENOUS at 13:28

## 2017-04-02 RX ADMIN — Medication 12.5 MG: at 14:32

## 2017-04-02 RX ADMIN — POTASSIUM CHLORIDE 20 MEQ: 29.8 INJECTION, SOLUTION INTRAVENOUS at 21:33

## 2017-04-02 RX ADMIN — FENTANYL CITRATE 100 MCG: 50 INJECTION, SOLUTION INTRAMUSCULAR; INTRAVENOUS at 21:37

## 2017-04-02 NOTE — PLAN OF CARE
Problem: Cardiac: Acute Coronary Syndrome (ACS) (Adult)  Goal: Signs and Symptoms of Listed Potential Problems Will be Absent or Manageable (Cardiac: Acute Coronary Syndrome)  Signs and symptoms of listed potential problems will be absent or manageable by discharge/transition of care (reference Cardiac: Acute Coronary Syndrome (ACS) (Adult) CPG).   D/I: Pt remains intubated and sedated with precedex and fentanyl gtt's. He will wake to voice and follow commands. He is able to make his needs known and was able to communicate that his right leg was painful and tingling. Bilateral lower extremities equal in temperature and palpable pulses with CMS intact. MD notified of change and assessed pt at bedside within minutes. No new orders, will continue to monitor for change. PRN fentanyl and versed boluses given for pain and anxiety.  IABP 1:1 in right groin with site clean, dry, and intact. Pt SBP dropped when IABP temporarily turned to 1:2. Nitroglycerin gtt titrated to keep MAP 70-80's and mostly in the 80-90's overnight, MD aware. Bumex gtt slowly weaned off over night with urine output increasing and CVP dropping to 8.   A: Pt appears to be more alert and interactive and able to redirect quickly. He is able to communicate his needs but is impulsive and tries to sit up if inadequately sedated. Lots of family here last night visiting and updated on pt status.  P: Continue to monitor closely for changes is hemodynamic status. Continue with pulmonary toilet and wean sedation as tolerated. Possibly weaning IABP today. See flowsheets for details.

## 2017-04-02 NOTE — PROGRESS NOTES
"Cardiology Progress Note    Events and interval changes in past 24 hours:   CI 2.4 when was on IABP 1:2 for 1.5 hrs. Bumex turned off overnight b/c CVP downtrending to 8 and making 300cc/hr      Tm 37.1 HR 90s   IABP 1:1 MAP 90  CMV 12/450/5/40 VBG 7.51/32/46/36 lactate 2    8:30 am on IABP 1:3 for one hour  CVP 14  PA 38/26/30  PCW 24  ScVO2 56%  CI 2.3  CO 3.7  SVR 1500  PVR 1.6    4am  CVP 8 MAP 90  PA 28/20/25  PCW  SvO2 59  BSA 1.6 Hgb 9.0   CI 2.4  CO 4  SVR 1600  PVR      Drips  Dobutamine 2.5  Nitroglycerin drip 2 mcg/kg/min  Dopamine off  Epi off  Phenylephrine off    Bumex off    Versed off,   fentanyl 50, Precedex 1  Heparin drip    Insulin drip    Meds  Asa 81  Plavix 75  Hydrocortisone taper  Unasyn  PPI BID  Heparin drip for IABP    CXR: no pulm edema, Dresher, ETT, IABP, enteric tube in stomach  I/O 5.2/5.5 since MN 1.5/2.5  Adm wt 139 wt today 137 yesterday 137    OBJECTIVE FINDINGS:  /61  Temp 97.5  F (36.4  C) (Axillary)  Resp 12  Ht 1.575 m (5' 2.01\")  Wt 62.2 kg (137 lb 2 oz)  SpO2 100%  BMI 25.07 kg/m2    Gen: intubated  CV: RRR, S1 & S2, systolic murmur in 2nd left ICS, LLB and apex  Lungs: CTAB anteriorly  Abd: slightly distended, soft  Ext: DP pulses not palpable but dopplerable per RN, no peripheral edema          Wt Readings from Last 5 Encounters:   04/02/17 62.2 kg (137 lb 2 oz)   03/27/17 62 kg (136 lb 11 oz)   07/15/08 68.5 kg (151 lb)     .labs    Labs and Imaging  Reviewed in epic  Flu swab negative    Echo 3/27  The visual ejection fraction is estimated at 30-35%.  There is extensive inferior, inferoseptal, and inferolateral wall akinesis.  Basal/mid lateral wall hypokinesis  There is moderate to severe septal hypokinesis.  Septal wall motion abnormality may reflect pacemaker activation.  There is a catheter/pacemaker lead seen in the right ventricle.  The right ventricle is mildly dilated.  Severely decreased right ventricular systolic function  There is no pericardial " effusion.  The rhythm was paced.  Compared to the prior study, the LVEF appears somewhat decreased. Septal wall  motion abnormality larger- may reflect, in part, that patient in sinus tach on  prior study, and ventricular paced now. No hemodynamically significant  valvular abnormalities on 2D or color flow imaging    CT chest/abdomen 3/27   IMPRESSION:   1. Small mediastinal hemorrhage, small bilateral pleural effusions  which may be hemorrhagic. Small intraperitoneal hemorrhage. Minimal  pericardial hemorrhage.  2. Minimal pneumoperitoneum in the left paracolic gutter, of unknown  significance. No pneumatosis as clinically questioned.  3. IABP slightly low in position.  4. Bilateral pulmonary dependent consolidations represent compression  atelectasis, however differentials include aspiration.    Cath 3/26 [facility]  SUMMARY:   --Inferior STEMI w/ late presentation. Culprit dictated by complete  heart block, and cardiogenic shock. Emergent echo in the Cath Lab also  shows significant RV dysfunction.  --Three vessel coronary artery disease with diffuse coronary disease  out to the distal vessels  --Successful POBA of the prox and mid-RCA. Stent was not placed, in  anticipation he may need to be considered for bypass surgery in the  near future  --Insertion of a temporary pacemaker for bradycardia (AVB) and  hypotension  --Insertion of a IABP  --Upper GI bleed consistent with Kaylin-Bonilla    [facility] 3/27    CT head 3/28: normal     Echo 3/29  Interpretation Summary  Limited study. Ischemic CM. Moderately (EF 30-35%) reduced left ventricular  function is present. Traced at 32%.  Posterior wall akinesis is present. Lateral wall akinesis is present. Inferior  wall akinesis is present.  Right ventricular function, chamber size, wall motion, and thickness are  normal.  Dilation of the inferior vena cava is present with abnormal respiratory  variation in diameter.     Compared to prior study, RV fxn is  improved.    Echo 3/31  Left Ventricle  The Ejection Fraction is estimated at 50-55%. Inferior,posterior,lateral,basal  septal wall akinesis as reported before. No change.    CORONARY ANGIOGRAM 4/1:   1. Both coronary arteries arise from their respective cusps.  2. Right dominant.  3. LM has mild luminal irregularities.   4. LAD supplies the apex along with the RCA (type 2 LAD) and gives rise to septal perforators and a large and branching D1. There are widely patent stents extending from the proximal to mid LAD. The dLAD has mild to moderate disease to 30% stenosis. The D1 is jailed by the LAD stents, but has EBER 3 flow with disease to 30% stenosis.    5. The small ramus is no longer present.  6. LCX is occluded at the ostium.   7. RCA gives rise to PL branches and supplies PDA. There are widely patent stents extending from the proximal to mid RCA. The remainder of the mRCA has disease to 50% stenosis and the dRCA has disease to 10% stenosis. The RPDA has a widely patent stent and the remainder of the artery has mild disease to 10% stenosis. The RPLA has small vessels with diffuse disease including a distal branch occlusion. The RPLA supplies some small collateral branches to the dLCx.     COMPLICATIONS:  1. None     SUMMARY:   1. Cardiogenic shock following an inferior STEMI.  2. The LCx re-occlusion is not surprising as the recently revascularized LCx  had poor outflow and the revascularized portion did not supply a large territory (limited to no flow was present distal to the stented segment immediately following stenting). Repeat revascularization of the LCx would result in the same outcome of repeat closure and also risk embolizing thrombus from the LCx into the LAD so no treatment of the occluded LCx was performed.   3. Three vessel coronary artery disease with patent LAD and RCA stents and an occluded LCx.    Plan for today  1) Given elevated CVP, restart bumex drip 1mg/hr with 2mg bolus. Goal CVP<12.    2) IABP pullled out. He was becoming agitated and moving his legs. MAPs in 90s at the time so gave Fentanyl 100mcg, Versed 2mg, increased precedex, started hydralazine 12.5mg QID. Increased nitroglyerin to 2.5mcg/kg/min. Goal MAP 70-80  3) free water 100cc/hr for 24 hrs then switch to 30cc q4hrs  5) Heparin 5000 u BID starting tonight for DVT prophylaxis    Card  # Mixed shock: Cardiogenic from biventricular failure from inferior STEMI and distributive shock from post-MI SIRS response vs aspiration pneumonia/pneumonitis from possible aspiration during cath at [facility] on 3/26  # Due to inferior wall STEMI. S/P 7 stents to LAD, circ, RCA (angiogram report pending). Now with IABP, temporary pacemaker after 3rd deg heart block in cath lab at [facility] on 3/26   # Small pericardial hemorrhage  - Continue IABP for coronary perfusion  -temp pacer removed 3/29  - Dual antiplatelet therapy with ASA 81mg and plavix 75mg qday  -eventually will start statin     # Neuro  -wakes up and mostly follows commands when on minimum sedation  Sedation versed gtt + PRNs  Analgesia fentanyl gtt + PRNs  Daily sedation holidays once stabilized        Respiratory  #Intubated for airway protection due to mental status and emesis on 3/26.   #Probable Aspiration PNA/pneumonitis  #Small mediastinal hemorrhage       # ID  . Sepsis from aspiration pneumonia/pneumonitis vs MRSA pneumonia vs post-MI SIRS response  - repeat cultures if febrile  - Unasyn total 7 day course  - Hydrocortisone taper     # Endocrine  T2DM: HA1C 7.5 yesterday on admission. BG climibing to 170s since admission. Will likely continue to rise with steroids.   - Insulin gtt per nurising protocol with hypoglycemia precautions.      # GI  Minimal pneumoperitoneum, unclear etiology: Seen by General surgery.   -Conservative Mx for now    Shock liver injury with coagulopathy-improving trend LFTs,   -Vit K 2.5mg 3/28, Vit K 3/29       # Renal/  Acute kidney injury-  improving, nonoliguric: multifactorial: hypoperfusion, contrast etc.  -Diuresis  - Daily Cr  - Avoid nephrotoxins as able  - Chavis placed 3/26    -Hypernatremia- free water    #Hem  -thrombocytopenia-likely from balloon pump  -Anemia- multifactorial     FEN: feeding tube  Code: FULL  PPx:  PPI 40mg IV BID, senna PRN  Dispo: pending stability    Will staff with Dr. Talon Park  General Cardiology fellow, PGY4  Pager [phone]

## 2017-04-02 NOTE — PROGRESS NOTES
[facility] [place]  Procedure Note           Intra-Aortic Balloon Pump Discontinuation:       Luke Henao  MRN# [id number]   April 2, 2017, 11:34 AM             IABP discontinued: April 2, 2017, 11:25 AM   Removed by: Dr. Park   Catheter saved: No      Recorded by True Montero

## 2017-04-03 ENCOUNTER — APPOINTMENT (OUTPATIENT)
Dept: OCCUPATIONAL THERAPY | Facility: CLINIC | Age: 52
DRG: 228 | End: 2017-04-03
Attending: INTERNAL MEDICINE
Payer: COMMERCIAL

## 2017-04-03 ENCOUNTER — OFFICE VISIT (OUTPATIENT)
Dept: INTERPRETER SERVICES | Facility: CLINIC | Age: 52
End: 2017-04-03

## 2017-04-03 ENCOUNTER — APPOINTMENT (OUTPATIENT)
Dept: PHYSICAL THERAPY | Facility: CLINIC | Age: 52
DRG: 228 | End: 2017-04-03
Attending: INTERNAL MEDICINE
Payer: COMMERCIAL

## 2017-04-03 ENCOUNTER — APPOINTMENT (OUTPATIENT)
Dept: CARDIOLOGY | Facility: CLINIC | Age: 52
DRG: 228 | End: 2017-04-03
Attending: INTERNAL MEDICINE
Payer: COMMERCIAL

## 2017-04-03 ENCOUNTER — APPOINTMENT (OUTPATIENT)
Dept: GENERAL RADIOLOGY | Facility: CLINIC | Age: 52
DRG: 228 | End: 2017-04-03
Attending: INTERNAL MEDICINE
Payer: COMMERCIAL

## 2017-04-03 LAB
ALBUMIN SERPL-MCNC: 2.6 G/DL (ref 3.4–5)
ALBUMIN SERPL-MCNC: 2.6 G/DL (ref 3.4–5)
ALBUMIN UR-MCNC: NEGATIVE MG/DL
ALP SERPL-CCNC: 72 U/L (ref 40–150)
ALP SERPL-CCNC: 88 U/L (ref 40–150)
ALT SERPL W P-5'-P-CCNC: 1001 U/L (ref 0–70)
ALT SERPL W P-5'-P-CCNC: 798 U/L (ref 0–70)
ANION GAP SERPL CALCULATED.3IONS-SCNC: 10 MMOL/L (ref 3–14)
ANION GAP SERPL CALCULATED.3IONS-SCNC: 14 MMOL/L (ref 3–14)
APPEARANCE UR: CLEAR
APTT PPP: 27 SEC (ref 22–37)
AST SERPL W P-5'-P-CCNC: 136 U/L (ref 0–45)
AST SERPL W P-5'-P-CCNC: 146 U/L (ref 0–45)
BACTERIA SPEC CULT: NO GROWTH
BACTERIA SPEC CULT: NO GROWTH
BASE EXCESS BLDA CALC-SCNC: 4.3 MMOL/L
BASE EXCESS BLDA CALC-SCNC: 5.8 MMOL/L
BASE EXCESS BLDV CALC-SCNC: 10.3 MMOL/L
BASE EXCESS BLDV CALC-SCNC: 11.5 MMOL/L
BASE EXCESS BLDV CALC-SCNC: 5.9 MMOL/L
BASE EXCESS BLDV CALC-SCNC: 5.9 MMOL/L
BASE EXCESS BLDV CALC-SCNC: 8.2 MMOL/L
BASE EXCESS BLDV CALC-SCNC: 9.8 MMOL/L
BASE EXCESS BLDV CALC-SCNC: 9.9 MMOL/L
BILIRUB DIRECT SERPL-MCNC: 0.3 MG/DL (ref 0–0.2)
BILIRUB DIRECT SERPL-MCNC: 0.5 MG/DL (ref 0–0.2)
BILIRUB SERPL-MCNC: 1.2 MG/DL (ref 0.2–1.3)
BILIRUB SERPL-MCNC: 1.5 MG/DL (ref 0.2–1.3)
BILIRUB UR QL STRIP: NEGATIVE
BUN SERPL-MCNC: 44 MG/DL (ref 7–30)
BUN SERPL-MCNC: 46 MG/DL (ref 7–30)
CA-I BLD-MCNC: 4.3 MG/DL (ref 4.4–5.2)
CALCIUM SERPL-MCNC: 7.4 MG/DL (ref 8.5–10.1)
CALCIUM SERPL-MCNC: 8.1 MG/DL (ref 8.5–10.1)
CHLORIDE SERPL-SCNC: 104 MMOL/L (ref 94–109)
CHLORIDE SERPL-SCNC: 105 MMOL/L (ref 94–109)
CO2 SERPL-SCNC: 26 MMOL/L (ref 20–32)
CO2 SERPL-SCNC: 30 MMOL/L (ref 20–32)
COLOR UR AUTO: NORMAL
CREAT SERPL-MCNC: 1.73 MG/DL (ref 0.66–1.25)
CREAT SERPL-MCNC: 1.8 MG/DL (ref 0.66–1.25)
ERYTHROCYTE [DISTWIDTH] IN BLOOD BY AUTOMATED COUNT: 15.5 % (ref 10–15)
ERYTHROCYTE [DISTWIDTH] IN BLOOD BY AUTOMATED COUNT: 16.2 % (ref 10–15)
GFR SERPL CREATININE-BSD FRML MDRD: 40 ML/MIN/1.7M2
GFR SERPL CREATININE-BSD FRML MDRD: 42 ML/MIN/1.7M2
GLUCOSE BLDC GLUCOMTR-MCNC: 104 MG/DL (ref 70–99)
GLUCOSE BLDC GLUCOMTR-MCNC: 113 MG/DL (ref 70–99)
GLUCOSE BLDC GLUCOMTR-MCNC: 115 MG/DL (ref 70–99)
GLUCOSE BLDC GLUCOMTR-MCNC: 125 MG/DL (ref 70–99)
GLUCOSE BLDC GLUCOMTR-MCNC: 127 MG/DL (ref 70–99)
GLUCOSE BLDC GLUCOMTR-MCNC: 127 MG/DL (ref 70–99)
GLUCOSE BLDC GLUCOMTR-MCNC: 132 MG/DL (ref 70–99)
GLUCOSE BLDC GLUCOMTR-MCNC: 138 MG/DL (ref 70–99)
GLUCOSE BLDC GLUCOMTR-MCNC: 143 MG/DL (ref 70–99)
GLUCOSE BLDC GLUCOMTR-MCNC: 151 MG/DL (ref 70–99)
GLUCOSE BLDC GLUCOMTR-MCNC: 155 MG/DL (ref 70–99)
GLUCOSE BLDC GLUCOMTR-MCNC: 168 MG/DL (ref 70–99)
GLUCOSE BLDC GLUCOMTR-MCNC: 196 MG/DL (ref 70–99)
GLUCOSE BLDC GLUCOMTR-MCNC: 244 MG/DL (ref 70–99)
GLUCOSE BLDC GLUCOMTR-MCNC: 86 MG/DL (ref 70–99)
GLUCOSE SERPL-MCNC: 128 MG/DL (ref 70–99)
GLUCOSE SERPL-MCNC: 151 MG/DL (ref 70–99)
GLUCOSE UR STRIP-MCNC: NEGATIVE MG/DL
HCO3 BLD-SCNC: 29 MMOL/L (ref 21–28)
HCO3 BLD-SCNC: 30 MMOL/L (ref 21–28)
HCO3 BLDV-SCNC: 30 MMOL/L (ref 21–28)
HCO3 BLDV-SCNC: 31 MMOL/L (ref 21–28)
HCO3 BLDV-SCNC: 33 MMOL/L (ref 21–28)
HCO3 BLDV-SCNC: 34 MMOL/L (ref 21–28)
HCO3 BLDV-SCNC: 34 MMOL/L (ref 21–28)
HCO3 BLDV-SCNC: 35 MMOL/L (ref 21–28)
HCO3 BLDV-SCNC: 36 MMOL/L (ref 21–28)
HCT VFR BLD AUTO: 31.9 % (ref 40–53)
HCT VFR BLD AUTO: 32.7 % (ref 40–53)
HGB BLD-MCNC: 10.3 G/DL (ref 13.3–17.7)
HGB BLD-MCNC: 10.3 G/DL (ref 13.3–17.7)
HGB UR QL STRIP: NEGATIVE
INR PPP: 1.12 (ref 0.86–1.14)
INTERPRETATION ECG - MUSE: NORMAL
INTERPRETATION ECG - MUSE: NORMAL
KETONES UR STRIP-MCNC: NEGATIVE MG/DL
LACTATE BLD-SCNC: 0.8 MMOL/L (ref 0.7–2.1)
LACTATE BLD-SCNC: 0.9 MMOL/L (ref 0.7–2.1)
LACTATE BLD-SCNC: 1.4 MMOL/L (ref 0.7–2.1)
LACTATE BLD-SCNC: 1.9 MMOL/L (ref 0.7–2.1)
LACTATE BLD-SCNC: 2.5 MMOL/L (ref 0.7–2.1)
LACTATE BLD-SCNC: 4.9 MMOL/L (ref 0.7–2.1)
LEUKOCYTE ESTERASE UR QL STRIP: NEGATIVE
LIPASE SERPL-CCNC: 1828 U/L (ref 73–393)
LMWH PPP CHRO-ACNC: NORMAL IU/ML
MAGNESIUM SERPL-MCNC: 2 MG/DL (ref 1.6–2.3)
MAGNESIUM SERPL-MCNC: 2.3 MG/DL (ref 1.6–2.3)
MAGNESIUM SERPL-MCNC: 2.7 MG/DL (ref 1.6–2.3)
MCH RBC QN AUTO: 30 PG (ref 26.5–33)
MCH RBC QN AUTO: 30.4 PG (ref 26.5–33)
MCHC RBC AUTO-ENTMCNC: 31.5 G/DL (ref 31.5–36.5)
MCHC RBC AUTO-ENTMCNC: 32.3 G/DL (ref 31.5–36.5)
MCV RBC AUTO: 94 FL (ref 78–100)
MCV RBC AUTO: 95 FL (ref 78–100)
MICRO REPORT STATUS: NORMAL
MICRO REPORT STATUS: NORMAL
NITRATE UR QL: NEGATIVE
O2/TOTAL GAS SETTING VFR VENT: 40 %
O2/TOTAL GAS SETTING VFR VENT: ABNORMAL %
OXYHGB MFR BLD: 96 % (ref 92–100)
OXYHGB MFR BLD: 96 % (ref 92–100)
OXYHGB MFR BLDV: 41 %
OXYHGB MFR BLDV: 43 %
OXYHGB MFR BLDV: 45 %
OXYHGB MFR BLDV: 49 %
OXYHGB MFR BLDV: 52 %
OXYHGB MFR BLDV: 57 %
OXYHGB MFR BLDV: 57 %
PCO2 BLD: 40 MM HG (ref 35–45)
PCO2 BLD: 43 MM HG (ref 35–45)
PCO2 BLDV: 40 MM HG (ref 40–50)
PCO2 BLDV: 42 MM HG (ref 40–50)
PCO2 BLDV: 44 MM HG (ref 40–50)
PCO2 BLDV: 46 MM HG (ref 40–50)
PCO2 BLDV: 47 MM HG (ref 40–50)
PCO2 BLDV: 49 MM HG (ref 40–50)
PCO2 BLDV: 50 MM HG (ref 40–50)
PH BLD: 7.46 PH (ref 7.35–7.45)
PH BLD: 7.46 PH (ref 7.35–7.45)
PH BLDV: 7.41 PH (ref 7.32–7.43)
PH BLDV: 7.46 PH (ref 7.32–7.43)
PH BLDV: 7.47 PH (ref 7.32–7.43)
PH BLDV: 7.48 PH (ref 7.32–7.43)
PH BLDV: 7.48 PH (ref 7.32–7.43)
PH BLDV: 7.52 PH (ref 7.32–7.43)
PH BLDV: 7.52 PH (ref 7.32–7.43)
PH UR STRIP: 5 PH (ref 5–7)
PHOSPHATE SERPL-MCNC: 3 MG/DL (ref 2.5–4.5)
PLATELET # BLD AUTO: 129 10E9/L (ref 150–450)
PLATELET # BLD AUTO: 169 10E9/L (ref 150–450)
PO2 BLD: 105 MM HG (ref 80–105)
PO2 BLD: 95 MM HG (ref 80–105)
PO2 BLDV: 25 MM HG (ref 25–47)
PO2 BLDV: 26 MM HG (ref 25–47)
PO2 BLDV: 27 MM HG (ref 25–47)
PO2 BLDV: 29 MM HG (ref 25–47)
PO2 BLDV: 31 MM HG (ref 25–47)
PO2 BLDV: 33 MM HG (ref 25–47)
PO2 BLDV: 35 MM HG (ref 25–47)
POTASSIUM BLD-SCNC: 3.5 MMOL/L (ref 3.4–5.3)
POTASSIUM BLD-SCNC: 3.7 MMOL/L (ref 3.4–5.3)
POTASSIUM BLD-SCNC: 3.7 MMOL/L (ref 3.4–5.3)
POTASSIUM BLD-SCNC: 3.9 MMOL/L (ref 3.4–5.3)
POTASSIUM BLD-SCNC: 4 MMOL/L (ref 3.4–5.3)
POTASSIUM SERPL-SCNC: 3.8 MMOL/L (ref 3.4–5.3)
POTASSIUM SERPL-SCNC: 4 MMOL/L (ref 3.4–5.3)
POTASSIUM SERPL-SCNC: 4.1 MMOL/L (ref 3.4–5.3)
PROT SERPL-MCNC: 6.1 G/DL (ref 6.8–8.8)
PROT SERPL-MCNC: 6.7 G/DL (ref 6.8–8.8)
RBC # BLD AUTO: 3.39 10E12/L (ref 4.4–5.9)
RBC # BLD AUTO: 3.43 10E12/L (ref 4.4–5.9)
SODIUM SERPL-SCNC: 143 MMOL/L (ref 133–144)
SODIUM SERPL-SCNC: 145 MMOL/L (ref 133–144)
SP GR UR STRIP: 1.01 (ref 1–1.03)
SPECIMEN SOURCE: NORMAL
SPECIMEN SOURCE: NORMAL
TROPONIN I SERPL-MCNC: 26.62 UG/L (ref 0–0.04)
URN SPEC COLLECT METH UR: NORMAL
UROBILINOGEN UR STRIP-MCNC: NORMAL MG/DL (ref 0–2)
WBC # BLD AUTO: 14.2 10E9/L (ref 4–11)
WBC # BLD AUTO: 15.1 10E9/L (ref 4–11)

## 2017-04-03 PROCEDURE — S0171 BUMETANIDE 0.5 MG: HCPCS | Performed by: INTERNAL MEDICINE

## 2017-04-03 PROCEDURE — 97162 PT EVAL MOD COMPLEX 30 MIN: CPT | Mod: GP

## 2017-04-03 PROCEDURE — 25000132 ZZH RX MED GY IP 250 OP 250 PS 637: Performed by: INTERNAL MEDICINE

## 2017-04-03 PROCEDURE — 40000196 ZZH STATISTIC RAPCV CVP MONITORING

## 2017-04-03 PROCEDURE — 83605 ASSAY OF LACTIC ACID: CPT | Performed by: INTERNAL MEDICINE

## 2017-04-03 PROCEDURE — 82805 BLOOD GASES W/O2 SATURATION: CPT | Performed by: INTERNAL MEDICINE

## 2017-04-03 PROCEDURE — 25000132 ZZH RX MED GY IP 250 OP 250 PS 637: Performed by: STUDENT IN AN ORGANIZED HEALTH CARE EDUCATION/TRAINING PROGRAM

## 2017-04-03 PROCEDURE — 82248 BILIRUBIN DIRECT: CPT | Performed by: INTERNAL MEDICINE

## 2017-04-03 PROCEDURE — 83735 ASSAY OF MAGNESIUM: CPT | Performed by: INTERNAL MEDICINE

## 2017-04-03 PROCEDURE — 25000125 ZZHC RX 250: Performed by: STUDENT IN AN ORGANIZED HEALTH CARE EDUCATION/TRAINING PROGRAM

## 2017-04-03 PROCEDURE — 83690 ASSAY OF LIPASE: CPT | Performed by: INTERNAL MEDICINE

## 2017-04-03 PROCEDURE — 25000125 ZZHC RX 250: Performed by: INTERNAL MEDICINE

## 2017-04-03 PROCEDURE — 40000014 ZZH STATISTIC ARTERIAL MONITORING DAILY

## 2017-04-03 PROCEDURE — 85520 HEPARIN ASSAY: CPT | Performed by: INTERNAL MEDICINE

## 2017-04-03 PROCEDURE — 27210437 ZZH NUTRITION PRODUCT SEMIELEM INTERMED LITER

## 2017-04-03 PROCEDURE — T1013 SIGN LANG/ORAL INTERPRETER: HCPCS | Mod: U3

## 2017-04-03 PROCEDURE — 84132 ASSAY OF SERUM POTASSIUM: CPT | Performed by: INTERNAL MEDICINE

## 2017-04-03 PROCEDURE — 81003 URINALYSIS AUTO W/O SCOPE: CPT | Performed by: INTERNAL MEDICINE

## 2017-04-03 PROCEDURE — 94003 VENT MGMT INPAT SUBQ DAY: CPT

## 2017-04-03 PROCEDURE — 97530 THERAPEUTIC ACTIVITIES: CPT | Mod: GO

## 2017-04-03 PROCEDURE — 25000128 H RX IP 250 OP 636: Performed by: INTERNAL MEDICINE

## 2017-04-03 PROCEDURE — 25000132 ZZH RX MED GY IP 250 OP 250 PS 637

## 2017-04-03 PROCEDURE — 71010 XR CHEST PORT 1 VW: CPT | Mod: 76

## 2017-04-03 PROCEDURE — 20000004 ZZH R&B ICU UMMC

## 2017-04-03 PROCEDURE — 71010 XR CHEST PORT 1 VW: CPT

## 2017-04-03 PROCEDURE — 40000048 ZZH STATISTIC DAILY SWAN MONITORING

## 2017-04-03 PROCEDURE — 40000133 ZZH STATISTIC OT WARD VISIT

## 2017-04-03 PROCEDURE — S0171 BUMETANIDE 0.5 MG: HCPCS | Performed by: STUDENT IN AN ORGANIZED HEALTH CARE EDUCATION/TRAINING PROGRAM

## 2017-04-03 PROCEDURE — 40000940 XR ABDOMEN PORT F1 VW

## 2017-04-03 PROCEDURE — 87040 BLOOD CULTURE FOR BACTERIA: CPT | Performed by: INTERNAL MEDICINE

## 2017-04-03 PROCEDURE — 25500064 ZZH RX 255 OP 636: Performed by: INTERNAL MEDICINE

## 2017-04-03 PROCEDURE — 97535 SELF CARE MNGMENT TRAINING: CPT | Mod: GO

## 2017-04-03 PROCEDURE — 85730 THROMBOPLASTIN TIME PARTIAL: CPT | Performed by: INTERNAL MEDICINE

## 2017-04-03 PROCEDURE — 99291 CRITICAL CARE FIRST HOUR: CPT | Mod: GC | Performed by: INTERNAL MEDICINE

## 2017-04-03 PROCEDURE — 93306 TTE W/DOPPLER COMPLETE: CPT | Mod: 26 | Performed by: INTERNAL MEDICINE

## 2017-04-03 PROCEDURE — 84100 ASSAY OF PHOSPHORUS: CPT | Performed by: INTERNAL MEDICINE

## 2017-04-03 PROCEDURE — 93005 ELECTROCARDIOGRAM TRACING: CPT

## 2017-04-03 PROCEDURE — 40000275 ZZH STATISTIC RCP TIME EA 10 MIN

## 2017-04-03 PROCEDURE — 40000193 ZZH STATISTIC PT WARD VISIT

## 2017-04-03 PROCEDURE — 85027 COMPLETE CBC AUTOMATED: CPT | Performed by: INTERNAL MEDICINE

## 2017-04-03 PROCEDURE — 97110 THERAPEUTIC EXERCISES: CPT | Mod: GP

## 2017-04-03 PROCEDURE — 85610 PROTHROMBIN TIME: CPT | Performed by: INTERNAL MEDICINE

## 2017-04-03 PROCEDURE — 82330 ASSAY OF CALCIUM: CPT | Performed by: INTERNAL MEDICINE

## 2017-04-03 PROCEDURE — 00000146 ZZHCL STATISTIC GLUCOSE BY METER IP

## 2017-04-03 PROCEDURE — 80076 HEPATIC FUNCTION PANEL: CPT | Performed by: INTERNAL MEDICINE

## 2017-04-03 PROCEDURE — 80048 BASIC METABOLIC PNL TOTAL CA: CPT | Performed by: INTERNAL MEDICINE

## 2017-04-03 PROCEDURE — 93010 ELECTROCARDIOGRAM REPORT: CPT | Mod: 76 | Performed by: INTERNAL MEDICINE

## 2017-04-03 PROCEDURE — 40000264 ECHO COMPLETE WITH OPTISON

## 2017-04-03 PROCEDURE — 84484 ASSAY OF TROPONIN QUANT: CPT | Performed by: INTERNAL MEDICINE

## 2017-04-03 PROCEDURE — 80053 COMPREHEN METABOLIC PANEL: CPT | Performed by: INTERNAL MEDICINE

## 2017-04-03 PROCEDURE — 97530 THERAPEUTIC ACTIVITIES: CPT | Mod: GP

## 2017-04-03 RX ORDER — BUMETANIDE 0.25 MG/ML
4 INJECTION INTRAMUSCULAR; INTRAVENOUS ONCE
Status: COMPLETED | OUTPATIENT
Start: 2017-04-03 | End: 2017-04-03

## 2017-04-03 RX ORDER — ONDANSETRON 2 MG/ML
4 INJECTION INTRAMUSCULAR; INTRAVENOUS EVERY 6 HOURS PRN
Status: DISCONTINUED | OUTPATIENT
Start: 2017-04-03 | End: 2017-05-12 | Stop reason: HOSPADM

## 2017-04-03 RX ORDER — BISACODYL 10 MG
10 SUPPOSITORY, RECTAL RECTAL DAILY PRN
Status: DISCONTINUED | OUTPATIENT
Start: 2017-04-03 | End: 2017-05-12 | Stop reason: HOSPADM

## 2017-04-03 RX ORDER — BUMETANIDE 0.25 MG/ML
5 INJECTION INTRAMUSCULAR; INTRAVENOUS ONCE
Status: COMPLETED | OUTPATIENT
Start: 2017-04-03 | End: 2017-04-03

## 2017-04-03 RX ORDER — PIPERACILLIN SODIUM, TAZOBACTAM SODIUM 3; .375 G/15ML; G/15ML
3.38 INJECTION, POWDER, LYOPHILIZED, FOR SOLUTION INTRAVENOUS EVERY 6 HOURS
Status: DISCONTINUED | OUTPATIENT
Start: 2017-04-03 | End: 2017-04-05

## 2017-04-03 RX ORDER — ISOSORBIDE DINITRATE 20 MG/1
20 TABLET ORAL 3 TIMES DAILY
Status: DISCONTINUED | OUTPATIENT
Start: 2017-04-03 | End: 2017-04-03

## 2017-04-03 RX ORDER — NITROGLYCERIN 20 MG/100ML
0.07-2 INJECTION INTRAVENOUS CONTINUOUS
Status: DISCONTINUED | OUTPATIENT
Start: 2017-04-03 | End: 2017-04-04

## 2017-04-03 RX ORDER — BUMETANIDE 0.25 MG/ML
2 INJECTION INTRAMUSCULAR; INTRAVENOUS ONCE
Status: COMPLETED | OUTPATIENT
Start: 2017-04-03 | End: 2017-04-03

## 2017-04-03 RX ORDER — HYDRALAZINE HYDROCHLORIDE 25 MG/1
25 TABLET, FILM COATED ORAL 4 TIMES DAILY
Status: DISCONTINUED | OUTPATIENT
Start: 2017-04-03 | End: 2017-04-04

## 2017-04-03 RX ORDER — ONDANSETRON 2 MG/ML
4 INJECTION INTRAMUSCULAR; INTRAVENOUS ONCE
Status: COMPLETED | OUTPATIENT
Start: 2017-04-03 | End: 2017-04-03

## 2017-04-03 RX ORDER — ISOSORBIDE DINITRATE 20 MG/1
20 TABLET ORAL 3 TIMES DAILY
Status: DISCONTINUED | OUTPATIENT
Start: 2017-04-03 | End: 2017-04-04

## 2017-04-03 RX ORDER — POLYETHYLENE GLYCOL 3350 17 G/17G
17 POWDER, FOR SOLUTION ORAL DAILY
Status: DISCONTINUED | OUTPATIENT
Start: 2017-04-03 | End: 2017-04-13

## 2017-04-03 RX ORDER — HYDRALAZINE HYDROCHLORIDE 25 MG/1
25 TABLET, FILM COATED ORAL 4 TIMES DAILY
Status: DISCONTINUED | OUTPATIENT
Start: 2017-04-03 | End: 2017-04-03

## 2017-04-03 RX ADMIN — ISOSORBIDE DINITRATE 20 MG: 20 TABLET ORAL at 14:27

## 2017-04-03 RX ADMIN — POTASSIUM CHLORIDE 20 MEQ: 29.8 INJECTION, SOLUTION INTRAVENOUS at 05:10

## 2017-04-03 RX ADMIN — VANCOMYCIN HYDROCHLORIDE 1500 MG: 10 INJECTION, POWDER, LYOPHILIZED, FOR SOLUTION INTRAVENOUS at 18:57

## 2017-04-03 RX ADMIN — SENNOSIDES AND DOCUSATE SODIUM 2 TABLET: 8.6; 5 TABLET ORAL at 20:22

## 2017-04-03 RX ADMIN — MULTIVIT AND MINERALS-FERROUS GLUCONATE 9 MG IRON/15 ML ORAL LIQUID 15 ML: at 08:12

## 2017-04-03 RX ADMIN — HUMAN INSULIN 6 UNITS/HR: 100 INJECTION, SOLUTION SUBCUTANEOUS at 08:40

## 2017-04-03 RX ADMIN — ASPIRIN 81 MG CHEWABLE TABLET 81 MG: 81 TABLET CHEWABLE at 08:13

## 2017-04-03 RX ADMIN — POTASSIUM CHLORIDE 20 MEQ: 29.8 INJECTION, SOLUTION INTRAVENOUS at 22:32

## 2017-04-03 RX ADMIN — POTASSIUM CHLORIDE 20 MEQ: 29.8 INJECTION, SOLUTION INTRAVENOUS at 01:38

## 2017-04-03 RX ADMIN — FENTANYL CITRATE 50 MCG: 50 INJECTION, SOLUTION INTRAMUSCULAR; INTRAVENOUS at 23:14

## 2017-04-03 RX ADMIN — Medication 2 G: at 15:00

## 2017-04-03 RX ADMIN — NITROGLYCERIN 2.5 MCG/KG/MIN: 20 INJECTION INTRAVENOUS at 10:35

## 2017-04-03 RX ADMIN — HYDRALAZINE HYDROCHLORIDE 25 MG: 25 TABLET ORAL at 15:44

## 2017-04-03 RX ADMIN — BUMETANIDE 5 MG: 0.25 INJECTION INTRAMUSCULAR; INTRAVENOUS at 18:19

## 2017-04-03 RX ADMIN — FENTANYL CITRATE 100 MCG: 50 INJECTION, SOLUTION INTRAMUSCULAR; INTRAVENOUS at 01:51

## 2017-04-03 RX ADMIN — DOBUTAMINE HYDROCHLORIDE 5 MCG/KG/MIN: 200 INJECTION INTRAVENOUS at 23:58

## 2017-04-03 RX ADMIN — POTASSIUM CHLORIDE 20 MEQ: 29.8 INJECTION, SOLUTION INTRAVENOUS at 13:45

## 2017-04-03 RX ADMIN — Medication 2 MG/HR: at 18:25

## 2017-04-03 RX ADMIN — POTASSIUM CHLORIDE 20 MEQ: 29.8 INJECTION, SOLUTION INTRAVENOUS at 20:43

## 2017-04-03 RX ADMIN — BUMETANIDE 4 MG: 0.25 INJECTION INTRAMUSCULAR; INTRAVENOUS at 14:27

## 2017-04-03 RX ADMIN — HUMAN ALBUMIN MICROSPHERES AND PERFLUTREN 4 ML: 10; .22 INJECTION, SOLUTION INTRAVENOUS at 20:30

## 2017-04-03 RX ADMIN — POTASSIUM CHLORIDE 20 MEQ: 29.8 INJECTION, SOLUTION INTRAVENOUS at 02:58

## 2017-04-03 RX ADMIN — BISACODYL 10 MG: 10 SUPPOSITORY RECTAL at 16:42

## 2017-04-03 RX ADMIN — HUMAN INSULIN 12 UNITS/HR: 100 INJECTION, SOLUTION SUBCUTANEOUS at 15:24

## 2017-04-03 RX ADMIN — Medication 25 MG: at 08:12

## 2017-04-03 RX ADMIN — PIPERACILLIN SODIUM,TAZOBACTAM SODIUM 3.38 G: 3; .375 INJECTION, POWDER, FOR SOLUTION INTRAVENOUS at 23:18

## 2017-04-03 RX ADMIN — SENNOSIDES AND DOCUSATE SODIUM 1 TABLET: 8.6; 5 TABLET ORAL at 08:12

## 2017-04-03 RX ADMIN — POLYETHYLENE GLYCOL 3350 17 G: 17 POWDER, FOR SOLUTION ORAL at 08:13

## 2017-04-03 RX ADMIN — HUMAN INSULIN 8 UNITS/HR: 100 INJECTION, SOLUTION SUBCUTANEOUS at 18:57

## 2017-04-03 RX ADMIN — POTASSIUM CHLORIDE 20 MEQ: 29.8 INJECTION, SOLUTION INTRAVENOUS at 00:35

## 2017-04-03 RX ADMIN — HEPARIN SODIUM 5000 UNITS: 5000 INJECTION, SOLUTION INTRAVENOUS; SUBCUTANEOUS at 20:21

## 2017-04-03 RX ADMIN — ISOSORBIDE DINITRATE 20 MG: 20 TABLET ORAL at 20:22

## 2017-04-03 RX ADMIN — PANTOPRAZOLE SODIUM 40 MG: 40 TABLET, DELAYED RELEASE ORAL at 08:12

## 2017-04-03 RX ADMIN — BUMETANIDE 2 MG: 0.25 INJECTION INTRAMUSCULAR; INTRAVENOUS at 08:56

## 2017-04-03 RX ADMIN — DEXMEDETOMIDINE 1.4 MCG/KG/HR: 100 INJECTION, SOLUTION, CONCENTRATE INTRAVENOUS at 05:25

## 2017-04-03 RX ADMIN — HUMAN INSULIN 3 UNITS/HR: 100 INJECTION, SOLUTION SUBCUTANEOUS at 03:00

## 2017-04-03 RX ADMIN — BUMETANIDE 2 MG: 0.25 INJECTION INTRAMUSCULAR; INTRAVENOUS at 01:49

## 2017-04-03 RX ADMIN — NITROGLYCERIN 2.5 MCG/KG/MIN: 20 INJECTION INTRAVENOUS at 15:09

## 2017-04-03 RX ADMIN — Medication 25 MG: at 12:23

## 2017-04-03 RX ADMIN — AMPICILLIN SODIUM AND SULBACTAM SODIUM 3 G: 2; 1 INJECTION, POWDER, FOR SOLUTION INTRAMUSCULAR; INTRAVENOUS at 11:07

## 2017-04-03 RX ADMIN — DEXMEDETOMIDINE 1.4 MCG/KG/HR: 100 INJECTION, SOLUTION, CONCENTRATE INTRAVENOUS at 00:40

## 2017-04-03 RX ADMIN — PIPERACILLIN SODIUM,TAZOBACTAM SODIUM 3.38 G: 3; .375 INJECTION, POWDER, FOR SOLUTION INTRAVENOUS at 18:19

## 2017-04-03 RX ADMIN — HEPARIN SODIUM 5000 UNITS: 5000 INJECTION, SOLUTION INTRAVENOUS; SUBCUTANEOUS at 08:13

## 2017-04-03 RX ADMIN — POTASSIUM CHLORIDE 20 MEQ: 29.8 INJECTION, SOLUTION INTRAVENOUS at 08:31

## 2017-04-03 RX ADMIN — HYDRALAZINE HYDROCHLORIDE 25 MG: 25 TABLET ORAL at 20:22

## 2017-04-03 RX ADMIN — ISOSORBIDE DINITRATE 20 MG: 20 TABLET ORAL at 11:07

## 2017-04-03 RX ADMIN — FENTANYL CITRATE 100 MCG/HR: 50 INJECTION, SOLUTION INTRAMUSCULAR; INTRAVENOUS at 00:17

## 2017-04-03 RX ADMIN — CLOPIDOGREL 75 MG: 75 TABLET, FILM COATED ORAL at 08:13

## 2017-04-03 RX ADMIN — ONDANSETRON 4 MG: 2 INJECTION INTRAMUSCULAR; INTRAVENOUS at 20:22

## 2017-04-03 NOTE — PROGRESS NOTES
04/03/17 1539   Quick Adds   Type of Visit Initial PT Evaluation   Living Environment   Lives With spouse   Living Arrangements house   Home Accessibility stairs to enter home;stairs (2 railings present)   Number of Stairs to Enter Home 3   Number of Stairs Within Home 0   Stair Railings at Home outside, present at both sides   Transportation Available car   Living Environment Comment Pt lives with wife. Wife unable to assist during day d/t working full-time. Pt works full-time night shift stocking lab shelves.    Self-Care   Usual Activity Tolerance good   Current Activity Tolerance fair   Regular Exercise yes   Activity/Exercise Type biking   Exercise Amount/Frequency daily;10 mins   Equipment Currently Used at Home none   Activity/Exercise/Self-Care Comment Pt reports biking daily at the gym for 10-15 minutes   Functional Level Prior   Ambulation 0-->independent   Transferring 0-->independent   Toileting 0-->independent   Bathing 0-->independent   Dressing 0-->independent   Eating 0-->independent   Communication 0-->understands/communicates without difficulty   Swallowing 0-->swallows foods/liquids without difficulty   Cognition 0 - no cognition issues reported   Fall history within last six months no   Which of the above functional risks had a recent onset or change? ambulation;transferring   Prior Functional Level Comment IND PLOF without use of AD. Pt reports sometimes requiring assist with ADLs when LBP increases.   General Information   Onset of Illness/Injury or Date of Surgery - Date 03/27/17   Referring Physician Shea Mcginnis MD   Patient/Family Goals Statement To get back to PLOF and make it to son's graduation   Pertinent History of Current Problem (include personal factors and/or comorbidities that impact the POC) 51 year old male smoker with diabetes who was transferred from [facility] in cardiogenic shock after sustaining an inferior wall STEMI.   He was able to get through the PCI if the  RCA, LAD, and CFx system here without ECMO . IABP has been removed and pt has been extubated.    Precautions/Limitations fall precautions;oxygen therapy device and L/min  (4 LPM)   Heart Disease Risk Factors Smoking;Diabetes;Lack of physical activity;Gender;Medical history   General Observations Pt supine in bed, on NC for supplimental O2, agreeable to PT.    Cognitive Status Examination   Orientation person;place  (not time or situation)   Level of Consciousness alert   Follows Commands and Answers Questions 75% of the time;able to follow single-step instructions;unable to follow multi-step instructions   Personal Safety and Judgment impaired;impulsive  (moderately impulsive)   Pain Assessment   Patient Currently in Pain Yes, see Vital Sign flowsheet  (c/o mild lateral L hip pain, reports it is tolerable)   Integumentary/Edema   Integumentary/Edema no deficits were identifed   Posture    Posture Forward head position;Protracted shoulders   Range of Motion (ROM)   ROM Comment WNL based on functional movements   Strength   Strength Comments Not formally assessed, however based on functional movements and pt report, mild functional weakness present   Bed Mobility   Bed Mobility Comments mod Ax1 supine>sit   Transfer Skills   Transfer Comments min A x2 sit<>stand and bed>chair   Gait   Gait Comments NT: unsafe at this time   Balance   Balance Comments sitting balance requires min-mod A, standing balance requires min A with HHA   Sensory Examination   Sensory Perception Comments Denies N/T or changes in sensation   Coordination   Coordination no deficits were identified   Muscle Tone   Muscle Tone no deficits were identified   General Therapy Interventions   Planned Therapy Interventions balance training;bed mobility training;gait training;strengthening;home program guidelines;risk factor education;transfer training;progressive activity/exercise   Clinical Impression   Criteria for Skilled Therapeutic Intervention yes,  "treatment indicated   PT Diagnosis Dec IND and safety with functional mobility tasks   Influenced by the following impairments Impulsivity, functional strength and balance deficits, dec activity tolerance   Functional limitations due to impairments Dec IND and safety with bed mobility, transfers, gait, stair negotiation, and activity tolerance for ADLs, work duties, and household tasks   Clinical Presentation Evolving/Changing   Clinical Presentation Rationale Based on need for continuous vitals monitoring during session, recent extubation, large change in mobility from baseline, impulsivity, and clinical judgement    Clinical Decision Making (Complexity) Moderate complexity   Therapy Frequency` other (see comments)  (6x/wk)   Predicted Duration of Therapy Intervention (days/wks) 2 weeks   Anticipated Equipment Needs at Discharge other (see comments)  (none)   Anticipated Discharge Disposition Transitional Care Facility;Acute Rehabilitation Facility   Risk & Benefits of therapy have been explained Yes   Patient, Family & other staff in agreement with plan of care Yes   [facility] TM \"6 Clicks\"   2016, Trustees of [facility], under license to [employer].  All rights reserved.   6 Clicks Short Forms Basic Mobility Inpatient Short Form   [facility]  \"6 Clicks\" V.2 Basic Mobility Inpatient Short Form   1. Turning from your back to your side while in a flat bed without using bedrails? 2 - A Lot   2. Moving from lying on your back to sitting on the side of a flat bed without using bedrails? 2 - A Lot   3. Moving to and from a bed to a chair (including a wheelchair)? 2 - A Lot   4. Standing up from a chair using your arms (e.g., wheelchair, or bedside chair)? 2 - A Lot   5. To walk in hospital room? 2 - A Lot   6. Climbing 3-5 steps with a railing? 1 - Total   Basic Mobility Raw Score (Score out of 24.Lower scores equate to lower levels of function) 11   Total Evaluation Time   Total " Evaluation Time (Minutes) 5

## 2017-04-03 NOTE — PROGRESS NOTES
Patient suctioned and electively extubated per physician order. Placed on LFNC @ 4 LPM, breath sounds were clear, labs pending. Patient tolerated procedure well without any immediate complications.    Jc Bush, RT  4/3/2017 9:04 AM

## 2017-04-03 NOTE — PHARMACY-VANCOMYCIN DOSING SERVICE
"Pharmacy Vancomycin Consult Initial Note    Date of Service April 3, 2017  Patient's  1965  51 year old, male    Indication: fever, concern for sepsis    Current estimated CrCl = Estimated Creatinine Clearance: 43.3 mL/min (based on Cr of 1.8).    Creatinine for last 3 days  2017:  3:22 AM Creatinine 3.38 mg/dL;  3:44 PM Creatinine 3.02 mg/dL  2017:  4:05 AM Creatinine 2.41 mg/dL;  3:32 PM Creatinine 1.95 mg/dL  4/3/2017:  4:34 AM Creatinine 1.73 mg/dL;  4:53 PM Creatinine 1.80 mg/dL    Recent vancomycin level(s) for last 3 days  No results found for requested labs within last 72 hours.    Body mass index is 24.83 kg/(m^2).  Height is 5' 2.008\".   Wt Readings from Last 2 Encounters:   17 61.6 kg (135 lb 12.9 oz)   17 62 kg (136 lb 11 oz)         Vancomycin IV Administrations (past 72 hours)      No vancomycin orders with administrations in past 72 hours.              Nephrotoxins and other renal medications (Future)    Start     Dose/Rate Route Frequency Ordered Stop    17 2000  vancomycin (VANCOCIN) 1,250 mg in NaCl 0.9 % 250 mL intermittent infusion      1,250 mg Intravenous EVERY 24 HOURS 17 1813      04/03/17 1800  piperacillin-tazobactam (ZOSYN) 3.375 g vial to attach to  mL bag      3.375 g  over 1 Hours Intravenous EVERY 6 HOURS 17 17517 1800  bumetanide (BUMEX) injection 5 mg      5 mg Intravenous ONCE 17 1754      17 1800  vancomycin (VANCOCIN) 1,500 mg in NaCl 0.9 % 250 mL intermittent infusion      1,500 mg Intravenous ONCE 17 1759      17 1745  bumetanide (BUMEX) 0.25 mg/mL infusion      2 mg/hr  8 mL/hr  Intravenous CONTINUOUS 17 1732          Contrast Orders - past 72 hours (72h ago through future)    Start     Dose/Rate Route Frequency Ordered Stop    17 1100  iopamidol (ISOVUE-370) solution 10 mL  Status:  Discontinued      10 mL Intravenous ONCE 17 1055 17 1058    17 1100  iopamidol " (ISOVUE-370) solution 10 mL      10 mL INTRA-ARTERIAL ONCE 04/01/17 1059 04/01/17 1100          Plan:  1.  Start vancomycin 1500 mg IV x 1 followed by maintenance dose of vancomycin 1250 mg IV every 24 hours. Renal function has been fluctuating so will monitor closely and have a low threshold for adjusting the dose.    Patient previously on 1250 mg IV every 18 hours which resulted in a supratherapeutic trough level.  2.  Goal Trough Level: 15-20 mg/L   3.  Pharmacy will check trough levels as appropriate in 1-3 Days.    4.  Serum creatinine levels will be ordered daily for the first week of therapy and at least twice weekly for subsequent weeks.    5.  Mineral method utilized to dose vancomycin therapy: Method 2    Cecille Addison, PharmD  April 3, 2017

## 2017-04-03 NOTE — PROGRESS NOTES
Patient was extubated per order at approximately 0830 by RT. Lung sounds clear with bases diminished. Patient currently on 4 L NC and SpO2 >96%. , /70, MAP 85, Sinus Tachycardia. Patient is slightly agitated, and an  was called to communicate with patient. Will continue to monitor closely.

## 2017-04-03 NOTE — PLAN OF CARE
Problem: Cardiac: Acute Coronary Syndrome (ACS) (Adult)  Goal: Signs and Symptoms of Listed Potential Problems Will be Absent or Manageable (Cardiac: Acute Coronary Syndrome)  Signs and symptoms of listed potential problems will be absent or manageable by discharge/transition of care (reference Cardiac: Acute Coronary Syndrome (ACS) (Adult) CPG).   D: Pt continues to be intubated and lightly sedated with fentanyl and precedex. Pt requiring prn fentanyl and versed for anxiety. HR increases to 140's and SBP as high as 180 when anxious. Re-orientation will work some times but most often he will try and wiggle to the end of the bed and tries to communicate with hand signals and gets more frustrated. SVO2 and CI dropping throughout night and hydralizine dose increased. Urine output adequate with bumex bolus for CVP 14. Pt placed on PS at ~0600 and ABG drawn 30 minutes later. MD's aware and waiting for 0800 hemodynamics to decide on extubation.  P: Plan to extubate when ready. Continue with plan of care and increase activity as able. See flowsheets for details.

## 2017-04-03 NOTE — PLAN OF CARE
Problem: Goal Outcome Summary  Goal: Goal Outcome Summary  PT 4E: Initial Evaluation - Per pt report, previously IND with ADL and mobility tasks without use of AD. Works full-time stocking shelves and rides stationary bike daily at gym for 10-15 minutes. Currently pt requires mod A supine>sit, mod Ax1 sit<>stand, and min A x2 bed>chair with HHA. Pt moderately impulsive and has poor safety awareness - requires frequent redirection and reorientation to task. Engaged in seated LE exercise to inc functional strength. Hemodynamically stable throughout session on NC. Limited 2/2 to impulsivity, poor motor planning, dec activity tolerance, and functional strength and balance deficits. Pt will benefit from skilled PT in order to address stated deficits to progress towards safe and IND PLOF.      REC: IP rehab once medically stable.

## 2017-04-03 NOTE — PLAN OF CARE
Problem: Goal Outcome Summary  Goal: Goal Outcome Summary  OT 4E: Pt now extubated on 4L NC. Pt alert however disoriented answering questions with 25% accuracy. Pt impulsive and demonstrated safety concerns throughout session. Pt supine > EOB with mod A. Pt completed sit > stand and pivot to chair with min A x2. Pt required verbal/tactile cues for hip extension and foot placement throughout transfer. Pt limited by weakness, decreased activity tolerance, and cognitive deficits.      Rec TCU when medically stable to progress independence with ADLs/functional mobility.

## 2017-04-03 NOTE — PROGRESS NOTES
"Cardiology Progress Note    Events and interval changes in past 24 hours:   Overnight, Hydralazine increased to 25mg QID. Nitro restarted   Bumex drip off since yesterday afternoon  Hasn't stooled in last 2 days    Tm 36.9 HR 90s 120s this AM   MAP 70-80 this   CMV 12/450/5/40     4am  CVP 12   PA 36/24  PCW  SvO2 59  BSA 1.6 Hgb 9.0   CI 1.7  CO 2.9  SVR 1700  PVR      Drips  Dobutamine 2.5  Nitroglycerin drip 2 mcg/kg/min  Dopamine off  Epi off  Phenylephrine off    Bumex 1mg/hr    Versed off,   fentanyl 50, Precedex 1  Heparin drip    Insulin drip    Meds  Hydralazine 25  QID  Asa 81  Plavix 75  Unasyn  PPI BID  Heparin subq BID  fH2O 100cc/hr    CXR: no pulm edema, [place], ETT, IABP, enteric tube in stomach  I/O 6/5.1since MN 1.7/1.2  NG output 1L since   Adm wt 139 wt today 137 yesterday 137    OBJECTIVE FINDINGS:  BP (!) 162/95  Temp 98.5  F (36.9  C) (Axillary)  Resp 12  Ht 1.575 m (5' 2.01\")  Wt 61.6 kg (135 lb 12.9 oz)  SpO2 99%  BMI 24.83 kg/m2    Gen: alert, following commands  CV: tachy, S1 & S2, S3 holosystolic murmur at apex  Lungs: CTAB anteriorly  Abd: slightly distended, soft  Ext: DP pulses not palpable but dopplerable per RN, no peripheral edema          Wt Readings from Last 5 Encounters:   04/03/17 61.6 kg (135 lb 12.9 oz)   03/27/17 62 kg (136 lb 11 oz)   07/15/08 68.5 kg (151 lb)     CMP  Recent Labs  Lab 04/03/17  0434 04/03/17  0422 04/03/17  0016 04/02/17  1941 04/02/17  1532  04/02/17  0405  04/01/17  1544 04/01/17  1207  04/01/17  0322  03/31/17  1923  03/31/17  0321     --   --   --  146*  --  149*  --  147*  --   --  146*  < >  --   < > 147*   POTASSIUM 4.1 4.0 3.5 3.5 3.6  3.5  < > 3.2*  3.2*  < > 3.9  3.9  --   < > 3.5  3.5  < > 3.0*  < > 3.7   CHLORIDE 104  --   --   --  104  --  107  --  107  --   --  103  --   --   < > 107   CO2 30  --   --   --  33*  --  31  --  30  --   --  32  --   --   < > 28   ANIONGAP 10  --   --   --  10  --  11  --  10  --   " --  10  --   --   < > 11   *  --   --   --  162*  --  147*  --  188*  --   --  128*  --   --   < > 127*   BUN 46*  --   --   --  50*  --  56*  --  66*  --   --  67*  --   --   < > 63*   CR 1.73*  --   --   --  1.95*  --  2.41*  --  3.02*  --   --  3.38*  --   --   < > 3.49*   GFRESTIMATED 42*  --   --   --  37*  --  29*  --  22*  --   --  19*  --   --   < > 19*   GFRESTBLACK 51*  --   --   --  44*  --  35*  --  27*  --   --  24*  --   --   < > 23*   ROB 8.1*  --   --   --  7.9*  --  7.6*  --  7.2*  --   --  7.6*  --   --   < > 7.1*   MAG 2.3  --   --   --  2.3  --  2.4*  --  2.3 2.2  --  2.3  --  2.2  < > 2.3   PHOS  --   --   --   --  3.2  --   --   --   --  3.1  --  2.9  --  4.1  --  4.1   PROTTOTAL 6.1*  --   --   --   --   --  6.1*  --   --   --   --  5.9*  --   --   --  5.6*   ALBUMIN 2.6*  --   --   --   --   --  2.6*  --   --   --   --  2.5*  --   --   --  2.4*   BILITOTAL 1.5*  --   --   --   --   --  0.7  --   --   --   --  1.0  --   --   --  0.8   ALKPHOS 72  --   --   --   --   --  68  --   --   --   --  74  --   --   --  65   *  --   --   --   --   --  284*  --   --   --   --  599*  --   --   --  1233*   ALT 1001*  --   --   --   --   --  1396*  --   --   --   --  1990*  --   --   --  2898*   < > = values in this interval not displayed.  CBC  Recent Labs  Lab 04/03/17 0434 04/02/17  0405 04/01/17 0322 03/31/17  1923   WBC 14.2* 15.5* 15.8* 14.9*   RBC 3.43* 3.11* 2.99* 2.95*   HGB 10.3* 9.3* 8.8* 8.9*   HCT 32.7* 29.4* 27.5* 27.0*   MCV 95 95 92 92   MCH 30.0 29.9 29.4 30.2   MCHC 31.5 31.6 32.0 33.0   RDW 15.5* 14.5 13.8 13.8   * 117* 124* 118*     INR  Recent Labs  Lab 04/03/17 0434 04/02/17 0405 04/01/17 0322 03/31/17  0321   INR 1.12 1.18* 1.37* 1.48*     Arterial Blood Gas  Recent Labs  Lab 04/03/17  0630 04/03/17  0422 04/03/17  0016 04/02/17  1941  03/28/17  0057  03/27/17  2300  03/27/17  2055   PH 7.46*  --   --   --   --  7.33*  --  7.21*  --  7.10*   PCO2 43  --   --    --   --  37  --  33*  --  46*   PO2 105  --   --   --   --  103  --  297*  --  58*   HCO3 30*  --   --   --   --  20*  --  13*  --  14*   O2PER 40.0 40.0 40.0 40.0  < > 40%  < > 100  < > 100.0   < > = values in this interval not displayed.      Labs and Imaging  Reviewed in epic  Flu swab negative    Echo 3/27  The visual ejection fraction is estimated at 30-35%.  There is extensive inferior, inferoseptal, and inferolateral wall akinesis.  Basal/mid lateral wall hypokinesis  There is moderate to severe septal hypokinesis.  Septal wall motion abnormality may reflect pacemaker activation.  There is a catheter/pacemaker lead seen in the right ventricle.  The right ventricle is mildly dilated.  Severely decreased right ventricular systolic function  There is no pericardial effusion.  The rhythm was paced.  Compared to the prior study, the LVEF appears somewhat decreased. Septal wall  motion abnormality larger- may reflect, in part, that patient in sinus tach on  prior study, and ventricular paced now. No hemodynamically significant  valvular abnormalities on 2D or color flow imaging    CT chest/abdomen 3/27   IMPRESSION:   1. Small mediastinal hemorrhage, small bilateral pleural effusions  which may be hemorrhagic. Small intraperitoneal hemorrhage. Minimal  pericardial hemorrhage.  2. Minimal pneumoperitoneum in the left paracolic gutter, of unknown  significance. No pneumatosis as clinically questioned.  3. IABP slightly low in position.  4. Bilateral pulmonary dependent consolidations represent compression  atelectasis, however differentials include aspiration.    Cath 3/26 [facility]  SUMMARY:   --Inferior STEMI w/ late presentation. Culprit dictated by complete  heart block, and cardiogenic shock. Emergent echo in the Cath Lab also  shows significant RV dysfunction.  --Three vessel coronary artery disease with diffuse coronary disease  out to the distal vessels  --Successful POBA of the prox and mid-RCA.  Stent was not placed, in  anticipation he may need to be considered for bypass surgery in the  near future  --Insertion of a temporary pacemaker for bradycardia (AVB) and  hypotension  --Insertion of a IABP  --Upper GI bleed consistent with [place]    [facility] 3/27    CT head 3/28: normal     Echo 3/29  Interpretation Summary  Limited study. Ischemic CM. Moderately (EF 30-35%) reduced left ventricular  function is present. Traced at 32%.  Posterior wall akinesis is present. Lateral wall akinesis is present. Inferior  wall akinesis is present.  Right ventricular function, chamber size, wall motion, and thickness are  normal.  Dilation of the inferior vena cava is present with abnormal respiratory  variation in diameter.     Compared to prior study, RV fxn is improved.    Echo 3/31  Left Ventricle  The Ejection Fraction is estimated at 50-55%. Inferior,posterior,lateral,basal  septal wall akinesis as reported before. No change.    CORONARY ANGIOGRAM 4/1:   1. Both coronary arteries arise from their respective cusps.  2. Right dominant.  3. LM has mild luminal irregularities.   4. LAD supplies the apex along with the RCA (type 2 LAD) and gives rise to septal perforators and a large and branching D1. There are widely patent stents extending from the proximal to mid LAD. The dLAD has mild to moderate disease to 30% stenosis. The D1 is jailed by the LAD stents, but has EBER 3 flow with disease to 30% stenosis.    5. The small ramus is no longer present.  6. LCX is occluded at the ostium.   7. RCA gives rise to PL branches and supplies PDA. There are widely patent stents extending from the proximal to mid RCA. The remainder of the mRCA has disease to 50% stenosis and the dRCA has disease to 10% stenosis. The RPDA has a widely patent stent and the remainder of the artery has mild disease to 10% stenosis. The RPLA has small vessels with diffuse disease including a distal branch occlusion. The RPLA supplies some  small collateral branches to the dLCx.     COMPLICATIONS:  1. None     SUMMARY:   1. Cardiogenic shock following an inferior STEMI.  2. The LCx re-occlusion is not surprising as the recently revascularized LCx  had poor outflow and the revascularized portion did not supply a large territory (limited to no flow was present distal to the stented segment immediately following stenting). Repeat revascularization of the LCx would result in the same outcome of repeat closure and also risk embolizing thrombus from the LCx into the LAD so no treatment of the occluded LCx was performed.   3. Three vessel coronary artery disease with patent LAD and RCA stents and an occluded LCx.    Plan for today  1) extubated today  2) Bumex drip restarted 1mg/hr with 2mg bolus due to high CVP  3) Swallow study  4) started Isordil 20 TID as HF med  fH2O changed to 30cc q4hrs    Card  # Mixed shock: Cardiogenic from biventricular failure from inferior STEMI and distributive shock from post-MI SIRS response vs aspiration pneumonia/pneumonitis from possible aspiration during cath at [facility] on 3/26  # Due to inferior wall STEMI. S/P 7 stents to LAD, circ, RCA (angiogram report pending). Now with IABP, temporary pacemaker after 3rd deg heart block in cath lab at [facility] on 3/26   # Small pericardial hemorrhage  - IABP removed 4/2  -temp pacer removed 3/29  - Dual antiplatelet therapy with ASA 81mg and plavix 75mg qday  -Hydralazine  -eventually will start statin     # Neuro  -wakes up and mostly follows commands when on minimum sedation  Sedation versed gtt + PRNs  Analgesia fentanyl gtt + PRNs  Daily sedation holidays once stabilized        Respiratory  #Intubated for airway protection due to mental status and emesis on 3/26.   #Probable Aspiration PNA/pneumonitis vs MSSA pneumonia  #Small mediastinal hemorrhage       # ID  . Sepsis from aspiration pneumonia/pneumonitis vs MSSA pneumonia vs post-MI SIRS response  - repeat  cultures if febrile  - Unasyn total 7 day course   - Hydrocortisone taper     # Endocrine  T2DM: HA1C 7.5 yesterday on admission. BG climibing to 170s since admission. Will likely continue to rise with steroids.   - Insulin gtt per nurising protocol with hypoglycemia precautions.      # GI  -possible ileus vs constipation-passing gas  -bowel regimen    Minimal pneumoperitoneum, unclear etiology: Seen by General surgery.  -had bloody NG output earlier in hospitalization. Was on PPI BID switched to once daily 4/3   -Conservative Mx for now    Shock liver injury with coagulopathy-improving trend LFTs,   -Vit K 2.5mg 3/28, Vit K 3/29       # Renal/  Acute kidney injury- improving, nonoliguric: multifactorial: hypoperfusion, contrast etc.  -Diuresis  - Daily Cr  - Avoid nephrotoxins as able  - Chavis placed 3/26    -Hypernatremia- resolved    #Hem  -thrombocytopenia-likely from balloon pump  -Anemia- multifactorial     FEN: feeding tube  Code: FULL  PPx:  PPI 40mg daily, senna PRN  Dispo: pending stability    Will staff with Dr. Talon Park  General Cardiology fellow, PGY4  Pager [phone]      I have reviewed today's vital signs, notes, medications, labs and imaging.  I have also seen and examined the patient and agree with the findings and plan as outlined above.  Pt extubated and interactive.  VSS with afebrile HR 120s 123/74, CVP 12, PA 36/24, CI 2.1 on Dbx 2.5.  Lungs with right basilar rales.  Tachy S1 and S2 with loud S3.  Abd is benign.  Labs with AST 1001, Cr 1.73, WBC 14.2.  Assessment: Pt with IWMI with percutaneous revascularization with FEMI on plavix.  Agree with advancing reverse remodeling agents.  Total critical care time 30 min.     Vikram Jean-Baptiste MD, PhD  Professor, Heart Failure and Cardiac Transplantation  [facility]

## 2017-04-04 ENCOUNTER — APPOINTMENT (OUTPATIENT)
Dept: CT IMAGING | Facility: CLINIC | Age: 52
DRG: 228 | End: 2017-04-04
Attending: INTERNAL MEDICINE
Payer: COMMERCIAL

## 2017-04-04 ENCOUNTER — APPOINTMENT (OUTPATIENT)
Dept: PHYSICAL THERAPY | Facility: CLINIC | Age: 52
DRG: 228 | End: 2017-04-04
Attending: INTERNAL MEDICINE
Payer: COMMERCIAL

## 2017-04-04 ENCOUNTER — APPOINTMENT (OUTPATIENT)
Dept: OCCUPATIONAL THERAPY | Facility: CLINIC | Age: 52
DRG: 228 | End: 2017-04-04
Attending: INTERNAL MEDICINE
Payer: COMMERCIAL

## 2017-04-04 LAB
ALBUMIN SERPL-MCNC: 2.6 G/DL (ref 3.4–5)
ALP SERPL-CCNC: 79 U/L (ref 40–150)
ALT SERPL W P-5'-P-CCNC: 707 U/L (ref 0–70)
ANION GAP SERPL CALCULATED.3IONS-SCNC: 10 MMOL/L (ref 3–14)
ANION GAP SERPL CALCULATED.3IONS-SCNC: 9 MMOL/L (ref 3–14)
APTT PPP: 30 SEC (ref 22–37)
AST SERPL W P-5'-P-CCNC: 128 U/L (ref 0–45)
BACTERIA SPEC CULT: NO GROWTH
BASE EXCESS BLDV CALC-SCNC: 10.4 MMOL/L
BASE EXCESS BLDV CALC-SCNC: 10.5 MMOL/L
BASE EXCESS BLDV CALC-SCNC: 10.6 MMOL/L
BASE EXCESS BLDV CALC-SCNC: 11.2 MMOL/L
BASE EXCESS BLDV CALC-SCNC: 11.6 MMOL/L
BASE EXCESS BLDV CALC-SCNC: 11.9 MMOL/L
BASE EXCESS BLDV CALC-SCNC: 6.9 MMOL/L
BASE EXCESS BLDV CALC-SCNC: 8.6 MMOL/L
BASE EXCESS BLDV CALC-SCNC: 9.3 MMOL/L
BASE EXCESS BLDV CALC-SCNC: 9.6 MMOL/L
BILIRUB DIRECT SERPL-MCNC: 0.4 MG/DL (ref 0–0.2)
BILIRUB SERPL-MCNC: 1.6 MG/DL (ref 0.2–1.3)
BUN SERPL-MCNC: 47 MG/DL (ref 7–30)
BUN SERPL-MCNC: 62 MG/DL (ref 7–30)
CA-I BLD-MCNC: 4.1 MG/DL (ref 4.4–5.2)
CALCIUM SERPL-MCNC: 7.6 MG/DL (ref 8.5–10.1)
CALCIUM SERPL-MCNC: 7.8 MG/DL (ref 8.5–10.1)
CHLORIDE SERPL-SCNC: 102 MMOL/L (ref 94–109)
CHLORIDE SERPL-SCNC: 102 MMOL/L (ref 94–109)
CO2 SERPL-SCNC: 33 MMOL/L (ref 20–32)
CO2 SERPL-SCNC: 34 MMOL/L (ref 20–32)
CREAT SERPL-MCNC: 2.31 MG/DL (ref 0.66–1.25)
CREAT SERPL-MCNC: 2.85 MG/DL (ref 0.66–1.25)
ERYTHROCYTE [DISTWIDTH] IN BLOOD BY AUTOMATED COUNT: 16.5 % (ref 10–15)
GFR SERPL CREATININE-BSD FRML MDRD: 24 ML/MIN/1.7M2
GFR SERPL CREATININE-BSD FRML MDRD: 30 ML/MIN/1.7M2
GLUCOSE BLDC GLUCOMTR-MCNC: 111 MG/DL (ref 70–99)
GLUCOSE BLDC GLUCOMTR-MCNC: 124 MG/DL (ref 70–99)
GLUCOSE BLDC GLUCOMTR-MCNC: 126 MG/DL (ref 70–99)
GLUCOSE BLDC GLUCOMTR-MCNC: 131 MG/DL (ref 70–99)
GLUCOSE BLDC GLUCOMTR-MCNC: 141 MG/DL (ref 70–99)
GLUCOSE BLDC GLUCOMTR-MCNC: 141 MG/DL (ref 70–99)
GLUCOSE BLDC GLUCOMTR-MCNC: 149 MG/DL (ref 70–99)
GLUCOSE BLDC GLUCOMTR-MCNC: 149 MG/DL (ref 70–99)
GLUCOSE BLDC GLUCOMTR-MCNC: 153 MG/DL (ref 70–99)
GLUCOSE BLDC GLUCOMTR-MCNC: 162 MG/DL (ref 70–99)
GLUCOSE BLDC GLUCOMTR-MCNC: 168 MG/DL (ref 70–99)
GLUCOSE BLDC GLUCOMTR-MCNC: 193 MG/DL (ref 70–99)
GLUCOSE BLDC GLUCOMTR-MCNC: 193 MG/DL (ref 70–99)
GLUCOSE BLDC GLUCOMTR-MCNC: 207 MG/DL (ref 70–99)
GLUCOSE BLDC GLUCOMTR-MCNC: 243 MG/DL (ref 70–99)
GLUCOSE BLDC GLUCOMTR-MCNC: 259 MG/DL (ref 70–99)
GLUCOSE BLDC GLUCOMTR-MCNC: 274 MG/DL (ref 70–99)
GLUCOSE BLDC GLUCOMTR-MCNC: 87 MG/DL (ref 70–99)
GLUCOSE SERPL-MCNC: 139 MG/DL (ref 70–99)
GLUCOSE SERPL-MCNC: 284 MG/DL (ref 70–99)
HCO3 BLDV-SCNC: 31 MMOL/L (ref 21–28)
HCO3 BLDV-SCNC: 33 MMOL/L (ref 21–28)
HCO3 BLDV-SCNC: 33 MMOL/L (ref 21–28)
HCO3 BLDV-SCNC: 34 MMOL/L (ref 21–28)
HCO3 BLDV-SCNC: 35 MMOL/L (ref 21–28)
HCO3 BLDV-SCNC: 36 MMOL/L (ref 21–28)
HCT VFR BLD AUTO: 29.6 % (ref 40–53)
HGB BLD-MCNC: 9.6 G/DL (ref 13.3–17.7)
INR PPP: 1.21 (ref 0.86–1.14)
INTERPRETATION ECG - MUSE: NORMAL
LACTATE BLD-SCNC: 1.8 MMOL/L (ref 0.7–2.1)
LACTATE BLD-SCNC: 2 MMOL/L (ref 0.7–2.1)
LACTATE BLD-SCNC: 2.2 MMOL/L (ref 0.7–2.1)
LACTATE BLD-SCNC: 2.4 MMOL/L (ref 0.7–2.1)
LACTATE BLD-SCNC: 2.8 MMOL/L (ref 0.7–2.1)
LACTATE BLD-SCNC: 3.1 MMOL/L (ref 0.7–2.1)
LACTATE BLD-SCNC: 3.1 MMOL/L (ref 0.7–2.1)
LACTATE BLD-SCNC: 3.7 MMOL/L (ref 0.7–2.1)
LACTATE BLD-SCNC: 4.2 MMOL/L (ref 0.7–2.1)
MAGNESIUM SERPL-MCNC: 3 MG/DL (ref 1.6–2.3)
MAGNESIUM SERPL-MCNC: 3 MG/DL (ref 1.6–2.3)
MCH RBC QN AUTO: 30.9 PG (ref 26.5–33)
MCHC RBC AUTO-ENTMCNC: 32.4 G/DL (ref 31.5–36.5)
MCV RBC AUTO: 95 FL (ref 78–100)
MICRO REPORT STATUS: NORMAL
O2/TOTAL GAS SETTING VFR VENT: ABNORMAL %
OXYHGB MFR BLDV: 35 %
OXYHGB MFR BLDV: 40 %
OXYHGB MFR BLDV: 44 %
OXYHGB MFR BLDV: 45 %
OXYHGB MFR BLDV: 46 %
OXYHGB MFR BLDV: 47 %
OXYHGB MFR BLDV: 51 %
OXYHGB MFR BLDV: 52 %
PCO2 BLDV: 42 MM HG (ref 40–50)
PCO2 BLDV: 43 MM HG (ref 40–50)
PCO2 BLDV: 44 MM HG (ref 40–50)
PCO2 BLDV: 44 MM HG (ref 40–50)
PCO2 BLDV: 45 MM HG (ref 40–50)
PCO2 BLDV: 46 MM HG (ref 40–50)
PCO2 BLDV: 48 MM HG (ref 40–50)
PCO2 BLDV: 50 MM HG (ref 40–50)
PH BLDV: 7.45 PH (ref 7.32–7.43)
PH BLDV: 7.46 PH (ref 7.32–7.43)
PH BLDV: 7.48 PH (ref 7.32–7.43)
PH BLDV: 7.49 PH (ref 7.32–7.43)
PH BLDV: 7.5 PH (ref 7.32–7.43)
PH BLDV: 7.51 PH (ref 7.32–7.43)
PH BLDV: 7.52 PH (ref 7.32–7.43)
PLATELET # BLD AUTO: 157 10E9/L (ref 150–450)
PO2 BLDV: 24 MM HG (ref 25–47)
PO2 BLDV: 25 MM HG (ref 25–47)
PO2 BLDV: 26 MM HG (ref 25–47)
PO2 BLDV: 26 MM HG (ref 25–47)
PO2 BLDV: 27 MM HG (ref 25–47)
PO2 BLDV: 28 MM HG (ref 25–47)
PO2 BLDV: 28 MM HG (ref 25–47)
PO2 BLDV: 29 MM HG (ref 25–47)
PO2 BLDV: 30 MM HG (ref 25–47)
PO2 BLDV: 30 MM HG (ref 25–47)
POTASSIUM BLD-SCNC: 3.4 MMOL/L (ref 3.4–5.3)
POTASSIUM BLD-SCNC: 3.5 MMOL/L (ref 3.4–5.3)
POTASSIUM BLD-SCNC: 3.7 MMOL/L (ref 3.4–5.3)
POTASSIUM BLD-SCNC: 4 MMOL/L (ref 3.4–5.3)
POTASSIUM BLD-SCNC: 4 MMOL/L (ref 3.4–5.3)
POTASSIUM BLD-SCNC: 4.1 MMOL/L (ref 3.4–5.3)
POTASSIUM BLD-SCNC: 4.4 MMOL/L (ref 3.4–5.3)
POTASSIUM SERPL-SCNC: 3.9 MMOL/L (ref 3.4–5.3)
POTASSIUM SERPL-SCNC: 4.3 MMOL/L (ref 3.4–5.3)
PROT SERPL-MCNC: 6.7 G/DL (ref 6.8–8.8)
RBC # BLD AUTO: 3.11 10E12/L (ref 4.4–5.9)
SODIUM SERPL-SCNC: 145 MMOL/L (ref 133–144)
SODIUM SERPL-SCNC: 146 MMOL/L (ref 133–144)
SPECIMEN SOURCE: NORMAL
WBC # BLD AUTO: 14.9 10E9/L (ref 4–11)

## 2017-04-04 PROCEDURE — S0171 BUMETANIDE 0.5 MG: HCPCS | Performed by: INTERNAL MEDICINE

## 2017-04-04 PROCEDURE — 25000132 ZZH RX MED GY IP 250 OP 250 PS 637: Performed by: INTERNAL MEDICINE

## 2017-04-04 PROCEDURE — 20000004 ZZH R&B ICU UMMC

## 2017-04-04 PROCEDURE — 25000125 ZZHC RX 250: Performed by: STUDENT IN AN ORGANIZED HEALTH CARE EDUCATION/TRAINING PROGRAM

## 2017-04-04 PROCEDURE — 40000193 ZZH STATISTIC PT WARD VISIT

## 2017-04-04 PROCEDURE — 82805 BLOOD GASES W/O2 SATURATION: CPT | Performed by: INTERNAL MEDICINE

## 2017-04-04 PROCEDURE — 85027 COMPLETE CBC AUTOMATED: CPT | Performed by: INTERNAL MEDICINE

## 2017-04-04 PROCEDURE — 25000132 ZZH RX MED GY IP 250 OP 250 PS 637: Performed by: STUDENT IN AN ORGANIZED HEALTH CARE EDUCATION/TRAINING PROGRAM

## 2017-04-04 PROCEDURE — 83605 ASSAY OF LACTIC ACID: CPT | Performed by: INTERNAL MEDICINE

## 2017-04-04 PROCEDURE — 80048 BASIC METABOLIC PNL TOTAL CA: CPT | Performed by: INTERNAL MEDICINE

## 2017-04-04 PROCEDURE — 99291 CRITICAL CARE FIRST HOUR: CPT | Mod: GC | Performed by: INTERNAL MEDICINE

## 2017-04-04 PROCEDURE — 25000132 ZZH RX MED GY IP 250 OP 250 PS 637

## 2017-04-04 PROCEDURE — 82248 BILIRUBIN DIRECT: CPT | Performed by: INTERNAL MEDICINE

## 2017-04-04 PROCEDURE — 25000128 H RX IP 250 OP 636: Performed by: STUDENT IN AN ORGANIZED HEALTH CARE EDUCATION/TRAINING PROGRAM

## 2017-04-04 PROCEDURE — 85730 THROMBOPLASTIN TIME PARTIAL: CPT | Performed by: INTERNAL MEDICINE

## 2017-04-04 PROCEDURE — 84132 ASSAY OF SERUM POTASSIUM: CPT | Performed by: INTERNAL MEDICINE

## 2017-04-04 PROCEDURE — 25000125 ZZHC RX 250: Performed by: INTERNAL MEDICINE

## 2017-04-04 PROCEDURE — T1013 SIGN LANG/ORAL INTERPRETER: HCPCS | Mod: U3

## 2017-04-04 PROCEDURE — 97535 SELF CARE MNGMENT TRAINING: CPT | Mod: GO

## 2017-04-04 PROCEDURE — 40000133 ZZH STATISTIC OT WARD VISIT

## 2017-04-04 PROCEDURE — 82330 ASSAY OF CALCIUM: CPT | Performed by: INTERNAL MEDICINE

## 2017-04-04 PROCEDURE — 25000128 H RX IP 250 OP 636: Performed by: INTERNAL MEDICINE

## 2017-04-04 PROCEDURE — 74176 CT ABD & PELVIS W/O CONTRAST: CPT

## 2017-04-04 PROCEDURE — 97530 THERAPEUTIC ACTIVITIES: CPT | Mod: GP

## 2017-04-04 PROCEDURE — 27210437 ZZH NUTRITION PRODUCT SEMIELEM INTERMED LITER

## 2017-04-04 PROCEDURE — 80053 COMPREHEN METABOLIC PANEL: CPT | Performed by: INTERNAL MEDICINE

## 2017-04-04 PROCEDURE — 83735 ASSAY OF MAGNESIUM: CPT | Performed by: INTERNAL MEDICINE

## 2017-04-04 PROCEDURE — 00000146 ZZHCL STATISTIC GLUCOSE BY METER IP

## 2017-04-04 PROCEDURE — S0171 BUMETANIDE 0.5 MG: HCPCS | Performed by: STUDENT IN AN ORGANIZED HEALTH CARE EDUCATION/TRAINING PROGRAM

## 2017-04-04 PROCEDURE — 85610 PROTHROMBIN TIME: CPT | Performed by: INTERNAL MEDICINE

## 2017-04-04 RX ORDER — DOBUTAMINE HCL IN DEXTROSE 5 % 500MG/250
5 INTRAVENOUS SOLUTION INTRAVENOUS CONTINUOUS
Status: DISCONTINUED | OUTPATIENT
Start: 2017-04-04 | End: 2017-04-09

## 2017-04-04 RX ORDER — BUMETANIDE 0.25 MG/ML
5 INJECTION INTRAMUSCULAR; INTRAVENOUS ONCE
Status: COMPLETED | OUTPATIENT
Start: 2017-04-04 | End: 2017-04-04

## 2017-04-04 RX ORDER — ISOSORBIDE DINITRATE 20 MG/1
40 TABLET ORAL 3 TIMES DAILY
Status: DISCONTINUED | OUTPATIENT
Start: 2017-04-04 | End: 2017-04-05

## 2017-04-04 RX ORDER — BUMETANIDE 0.25 MG/ML
4 INJECTION INTRAMUSCULAR; INTRAVENOUS ONCE
Status: COMPLETED | OUTPATIENT
Start: 2017-04-04 | End: 2017-04-04

## 2017-04-04 RX ORDER — OXYCODONE HYDROCHLORIDE 5 MG/1
5-10 TABLET ORAL EVERY 4 HOURS PRN
Status: DISCONTINUED | OUTPATIENT
Start: 2017-04-04 | End: 2017-05-12 | Stop reason: HOSPADM

## 2017-04-04 RX ORDER — OXYCODONE HYDROCHLORIDE 5 MG/1
5 TABLET ORAL EVERY 4 HOURS PRN
Status: DISCONTINUED | OUTPATIENT
Start: 2017-04-04 | End: 2017-04-04

## 2017-04-04 RX ORDER — HYDRALAZINE HYDROCHLORIDE 50 MG/1
50 TABLET, FILM COATED ORAL 4 TIMES DAILY
Status: DISCONTINUED | OUTPATIENT
Start: 2017-04-04 | End: 2017-04-05

## 2017-04-04 RX ADMIN — BUMETANIDE 5 MG: 0.25 INJECTION INTRAMUSCULAR; INTRAVENOUS at 10:41

## 2017-04-04 RX ADMIN — PIPERACILLIN SODIUM,TAZOBACTAM SODIUM 3.38 G: 3; .375 INJECTION, POWDER, FOR SOLUTION INTRAVENOUS at 12:01

## 2017-04-04 RX ADMIN — OXYCODONE HYDROCHLORIDE 5 MG: 5 TABLET ORAL at 20:43

## 2017-04-04 RX ADMIN — HUMAN INSULIN 7 UNITS/HR: 100 INJECTION, SOLUTION SUBCUTANEOUS at 16:09

## 2017-04-04 RX ADMIN — ONDANSETRON 4 MG: 2 INJECTION INTRAMUSCULAR; INTRAVENOUS at 01:34

## 2017-04-04 RX ADMIN — DOBUTAMINE 5 MCG/KG/MIN: 12.5 INJECTION, SOLUTION, CONCENTRATE INTRAVENOUS at 09:15

## 2017-04-04 RX ADMIN — HUMAN INSULIN 6 UNITS/HR: 100 INJECTION, SOLUTION SUBCUTANEOUS at 22:07

## 2017-04-04 RX ADMIN — HYDRALAZINE HYDROCHLORIDE 50 MG: 50 TABLET ORAL at 19:36

## 2017-04-04 RX ADMIN — POTASSIUM CHLORIDE 20 MEQ: 29.8 INJECTION, SOLUTION INTRAVENOUS at 00:23

## 2017-04-04 RX ADMIN — HUMAN INSULIN 7 UNITS/HR: 100 INJECTION, SOLUTION SUBCUTANEOUS at 00:00

## 2017-04-04 RX ADMIN — SENNOSIDES AND DOCUSATE SODIUM 1 TABLET: 8.6; 5 TABLET ORAL at 19:36

## 2017-04-04 RX ADMIN — HUMAN INSULIN 6 UNITS/HR: 100 INJECTION, SOLUTION SUBCUTANEOUS at 05:18

## 2017-04-04 RX ADMIN — ASPIRIN 81 MG CHEWABLE TABLET 81 MG: 81 TABLET CHEWABLE at 07:51

## 2017-04-04 RX ADMIN — CHLOROTHIAZIDE SODIUM 500 MG: 500 INJECTION, POWDER, LYOPHILIZED, FOR SOLUTION INTRAVENOUS at 10:42

## 2017-04-04 RX ADMIN — VANCOMYCIN HYDROCHLORIDE 1250 MG: 10 INJECTION, POWDER, LYOPHILIZED, FOR SOLUTION INTRAVENOUS at 19:36

## 2017-04-04 RX ADMIN — PIPERACILLIN SODIUM,TAZOBACTAM SODIUM 3.38 G: 3; .375 INJECTION, POWDER, FOR SOLUTION INTRAVENOUS at 18:12

## 2017-04-04 RX ADMIN — POTASSIUM CHLORIDE 40 MEQ: 1.5 POWDER, FOR SOLUTION ORAL at 12:35

## 2017-04-04 RX ADMIN — POTASSIUM CHLORIDE 20 MEQ: 29.8 INJECTION, SOLUTION INTRAVENOUS at 14:35

## 2017-04-04 RX ADMIN — PIPERACILLIN SODIUM,TAZOBACTAM SODIUM 3.38 G: 3; .375 INJECTION, POWDER, FOR SOLUTION INTRAVENOUS at 05:20

## 2017-04-04 RX ADMIN — Medication 2 MG/HR: at 04:28

## 2017-04-04 RX ADMIN — NITROGLYCERIN 1 MCG/KG/MIN: 20 INJECTION INTRAVENOUS at 01:37

## 2017-04-04 RX ADMIN — HEPARIN SODIUM 5000 UNITS: 5000 INJECTION, SOLUTION INTRAVENOUS; SUBCUTANEOUS at 07:51

## 2017-04-04 RX ADMIN — POTASSIUM CHLORIDE 20 MEQ: 29.8 INJECTION, SOLUTION INTRAVENOUS at 09:12

## 2017-04-04 RX ADMIN — BUMETANIDE 4 MG: 0.25 INJECTION INTRAMUSCULAR; INTRAVENOUS at 00:43

## 2017-04-04 RX ADMIN — OXYCODONE HYDROCHLORIDE 5 MG: 5 TABLET ORAL at 19:36

## 2017-04-04 RX ADMIN — ISOSORBIDE DINITRATE 40 MG: 20 TABLET ORAL at 07:01

## 2017-04-04 RX ADMIN — POLYETHYLENE GLYCOL 3350 17 G: 17 POWDER, FOR SOLUTION ORAL at 07:55

## 2017-04-04 RX ADMIN — MULTIVIT AND MINERALS-FERROUS GLUCONATE 9 MG IRON/15 ML ORAL LIQUID 15 ML: at 07:55

## 2017-04-04 RX ADMIN — PANTOPRAZOLE SODIUM 40 MG: 40 TABLET, DELAYED RELEASE ORAL at 09:12

## 2017-04-04 RX ADMIN — SODIUM NITROPRUSSIDE 0.25 MCG/KG/MIN: 25 INJECTION, SOLUTION, CONCENTRATE INTRAVENOUS at 12:02

## 2017-04-04 RX ADMIN — SENNOSIDES AND DOCUSATE SODIUM 2 TABLET: 8.6; 5 TABLET ORAL at 07:51

## 2017-04-04 RX ADMIN — HYDRALAZINE HYDROCHLORIDE 50 MG: 50 TABLET ORAL at 07:01

## 2017-04-04 RX ADMIN — CLOPIDOGREL 75 MG: 75 TABLET, FILM COATED ORAL at 07:51

## 2017-04-04 RX ADMIN — Medication 3 MG/HR: at 23:21

## 2017-04-04 RX ADMIN — POTASSIUM CHLORIDE 20 MEQ: 29.8 INJECTION, SOLUTION INTRAVENOUS at 01:34

## 2017-04-04 RX ADMIN — HEPARIN SODIUM 5000 UNITS: 5000 INJECTION, SOLUTION INTRAVENOUS; SUBCUTANEOUS at 19:36

## 2017-04-04 RX ADMIN — POTASSIUM CHLORIDE 40 MEQ: 750 TABLET, EXTENDED RELEASE ORAL at 09:11

## 2017-04-04 RX ADMIN — BUMETANIDE 5 MG: 0.25 INJECTION, SOLUTION INTRAMUSCULAR; INTRAVENOUS at 18:28

## 2017-04-04 RX ADMIN — HUMAN INSULIN 8 UNITS/HR: 100 INJECTION, SOLUTION SUBCUTANEOUS at 09:23

## 2017-04-04 RX ADMIN — Medication 3 MG/HR: at 15:22

## 2017-04-04 RX ADMIN — POTASSIUM CHLORIDE 20 MEQ: 29.8 INJECTION, SOLUTION INTRAVENOUS at 20:35

## 2017-04-04 RX ADMIN — ONDANSETRON 4 MG: 2 INJECTION INTRAMUSCULAR; INTRAVENOUS at 19:36

## 2017-04-04 ASSESSMENT — PAIN DESCRIPTION - DESCRIPTORS: DESCRIPTORS: ACHING

## 2017-04-04 NOTE — PLAN OF CARE
Problem: Individualization  Goal: Patient Preferences  Pt was probable candidate for balloon pump. Decision was made to not insert balloon pump after 4 ficks with CI improving from 1.8 tp 2.2. Nitroprusside was titrated up to 1. As nitroprusside was titrated up, CIs improved. On bumex at 3, dobutamine at 7.5 and insulin at 3. Tube feeding was stopped for two hours r/t possibility of balloon pump procedure. Continue to monitor.     Duc Mart  6:59 PM  4/4/2017

## 2017-04-04 NOTE — PROGRESS NOTES
CLINICAL NUTRITION SERVICES - REASSESSMENT NOTE     Nutrition Prescription    RECOMMENDATIONS FOR MDs/PROVIDERS TO ORDER:  -Free water flushes per MD pending Na trends (receiving 30 mL q4h as of 4/3)  -Advance diet per SLP recommendations    Malnutrition Status:    Pt does not meet criteria.    Recommendations already ordered by Registered Dietitian (RD):  -Continue Peptamen 1.5 @ goal 50 ml/hr (1200 ml/day) to provide 1800 kcal (29 kcal/kg), 82 g PRO (1.3 g/kg), 924 ml free H2O, 67 g Fat (70% from MCTs), 226 g CHO and no Fiber daily.    Future/Additional Recommendations:  -Offer/order ONS pending SLP eval per pt preference and order calorie counts once eating substantial amounts on diet >full liquids.     EVALUATION OF THE PROGRESS TOWARD GOALS   Diet: NPO. Awaiting SLP eval.    Nutrition Support (via NDT, placed by RD and bridled on 3/29): Peptamen 1.5 @ 50 ml/hr (1200 ml/day) to provide 1800 kcal (29 kcal/kg), 82 g PRO (1.3 g/kg), 924 ml free H2O, 67 g Fat (70% from MCTs), 226 g CHO and no Fiber daily per dosing weight of 62 kg.    Intake: TF started 3/29, advanced to goal 4/1. Average intake over past 5 days = 846 mL Peptamen 1.5 to provide 1269 kcal (21 kcal/kg) and 58 g protein (1 g/kg). However, since TF at goal, pt has received average of 1150 mL to provide 1725 kcal (28 kcal/kg) and 78 g protein (1.3 g/kg) or 100% of needs.       NEW FINDINGS   -Wt: New lowest wt 60.8 kg today - 3.6% loss from admit wt in 1 week (could be some fluid loss w/ bumex, potentially some true loss). Changed dosing wt below.   -Resp/CV: Extubated 4/3. EF 35%. On dobutamine, nitro w/ MAPs >60.   -GI: Abd CT- No evidence of ischemic bowel, obstruction, or intra-abdominal infection. +BM today, otherwise last charted BM on 3/31. On bowel regimen.   -Labs/Meds: Na 146, Free water 30 mL q4h. On Bumex gtt. Lactic acid 4.2.     ASSESSED NUTRITION NEEDS per 61 kg dosing wt  Estimated Energy Needs: 9385-2431 kcals/day (25 - 30  kcals/kg)  Justification: Maintenance  Estimated Protein Needs: 73-92 grams protein/day (1.2 - 1.5 grams of pro/kg)  Justification: Hypercatabolism with critical illness    MALNUTRITION  % Intake: No decreased intake noted  % Weight Loss: > 2% in 1 week (severe)  Subcutaneous Fat Loss: None observed  Muscle Loss: None observed  Fluid Accumulation/Edema: Trace generalized (does not meet criteria)  Malnutrition Diagnosis: Patient does not meet two of the above criteria necessary for diagnosing malnutrition    Previous Goals   1. Nutrition support within 2-3 days.  Evaluation: Met    2. Total avg nutritional intake to meet a minimum of 25 kcal/kg and 1.2 g PRO/kg daily (per dosing wt 62 kg).  Evaluation: Not met until past 2 days.     Previous Nutrition Diagnosis  Inadequate protein-energy intake related to intubated, unable to take PO as evidenced by NPO status since 3/27 with no other means of nutrition yet started and unclear timeline for starting TF given GI status.   Evaluation: Improving    CURRENT NUTRITION DIAGNOSIS  Inadequate protein-energy intake related to TF advancing to goal over past week as evidenced by average 5-day TF intakes met 20 kcal/kg and 1 kg/kg protein (with needs of 25 kcal/kg and 1.2 g/kg PRO), 3.6% wt loss in 1 week.      INTERVENTIONS  Implementation  No changes today.   Education - role of RD in POC w/ daughter.    Goals  Total avg nutritional intake to meet a minimum of 25 kcal/kg and 1.2 g PRO/kg daily (per dosing wt 61 kg).     Monitoring/Evaluation  Progress toward goals will be monitored and evaluated per protocol.    Paula Guerrero RD, LD  Pager: [phone]

## 2017-04-04 NOTE — PLAN OF CARE
Problem: Goal Outcome Summary  Goal: Goal Outcome Summary  OT 4E: Pt more lethargic and fatigued during today's session. Pt oriented to person and place but not time. Pt answering questions with 50% accuracy and demonstrated difficulty communicating needs. Pt completed supine > EOB with max A. Pt dangled EOB with CGA - pt endorsed dizziness with positional changes however BP within parameters. Pt completed sit > stand with max A and pivot transfer with min A x2. Pt benefited from verbal/tactile cues for foot placement throughout transfer.      Rec TCU when medically stable to address cognitive deficits, weakness, and balance leading to decreased ADL I.

## 2017-04-04 NOTE — PLAN OF CARE
Problem: Goal Outcome Summary  Goal: Goal Outcome Summary  PT 4E: Pt limited tolerance to therapy today 2/2 to fatigue. Engaged in bed mobility, transfer, and standing tolerance training. Requires mod A x2 for sit>supine, min A x2 for sit<>stand, mod Ax2 for chair>bed, and max Ax1 for standing while dependent jonelle-cares provided. Standing tolerance up to 4-5 minutes. Hemodynamically stable throughout session while on 2 LPM via NC. Limited 2/2 to fatigue, dec activity tolerance, and functional strength/balance deficits. Pt will benefit from skilled PT in order to address stated deficits to progress towards safe and IND PLOF.       REC: IP rehab once medically stable.

## 2017-04-04 NOTE — PLAN OF CARE
Problem: Goal Outcome Summary  Goal: Goal Outcome Summary  SLP: Patient leaving unit for CT at time of scheduled swallow evaluation with . Will reschedule for 4-5-2017 with . Consulted with RN.

## 2017-04-04 NOTE — PLAN OF CARE
Problem: Cardiac: Acute Coronary Syndrome (ACS) (Adult)  Goal: Signs and Symptoms of Listed Potential Problems Will be Absent or Manageable (Cardiac: Acute Coronary Syndrome)  Signs and symptoms of listed potential problems will be absent or manageable by discharge/transition of care (reference Cardiac: Acute Coronary Syndrome (ACS) (Adult) CPG).   D/I: Pt alert and oriented throughout night and having difficulty staying asleep because of frequent coughing. Lung sounds clear with small amount of thick white/yellow secretions suctioned out by patient with [place]. Dobutamine, Nitroglycerine and Bumex gtt's unchanged and insulin gtt titrated as needed. Good urine output with Bumex gtt and one bolus dose given for CVP 14. Venous oxyhemoglobin continues to be low and SVR increasing overnight. PO hydralazine and isosorbide doses increased.   P: Continue with plan of care. Increase activity as tolerated and use  during day to help answer questions. See flowsheets for details.

## 2017-04-04 NOTE — PROGRESS NOTES
Patient was extubated at 0830 to 2 L NC. Precedex and Fentanyl d/c'd. Patient alert but slightly agitated.  called to communicate with patient. -140/70-80s, MAPs 70s-90s, -140s. SpO2 >96%. At around 1730, patient's temperature increased to 102.4 degrees, and felt nauseated. SvO2 went down to 41-45 from 57. CI dropped from 2.1 to 1.7. SVR 2854-4913. Blood cultures, UA were drawn and are pending. Stat echo and EKG.    Patient currently on 5 mcg/kg/min Dobutamine, 1 mcg/kg/min Nitro, 4 units/hr Insulin, 2 mg/hr Bumex. Patient received a total of 11 mg bolus Bumex over 12 hour shift.     Patient has been passing flatus after receiving Miralax and a suppository but has not had a BM. Abdominal ultrasound this am does not suspect an ileus. Patient has been voiding 250-300 cc/hr.

## 2017-04-04 NOTE — PROGRESS NOTES
"Cardiology Progress Note    Events and interval changes in past 24 hours:   Echo EF 35% mod MR, inf and lat akinesis  Hydralazine increased to 50mg QID and Isordil to 40 this AM  Bumex 4,5,4mg bolus  Lactate 3<-2-2-5 (UA normal, blood cx NTD, CXR shows perihilar opacity)  ECG ST depressions in anterolateral leads Trop 26  Abd xray: nonspecific gas pattern, no dilated loops to suggest ileus or obstruction      Tm 39.1 6pm this AM 36.6 HR 90s 120s this AM   MAP 70s /60s    4am  CVP 12 MAP 75  PA 34/24  PCW  SvO2 35  BSA 1.6 Hgb 9.6   CI 1.6  CO 2.6  SVR 1800 (9111-8492 overnight)  PVR    Drips  Dobutamine 5  Nitroglycerin drip increased to 2 from 1 mcg/kg/min    Bumex 2mg/hr  Insulin drip    Meds  Hydralazine 50  QID  Isordil 40 TID  Asa 81  Plavix 75  Vanco, zosyn started 4/3  PPI BID  Heparin subq BID  fH2O 100cc/hr    CXR: no pulm edema, [place], ETT, IABP, enteric tube in stomach  I/O 4.6/4.9since MN 1.1/1.6  NG output 0.675L since   Adm wt 139 wt today 134 yesterday 133    OBJECTIVE FINDINGS:  BP 90/59 (BP Location: Left arm)  Temp 97.8  F (36.6  C) (Oral)  Resp 18  Ht 1.575 m (5' 2.01\")  Wt 60.8 kg (134 lb 0.6 oz)  SpO2 95%  BMI 24.51 kg/m2    Gen: alert, following commands  CV: tachy, S1 & S2, S3 holosystolic murmur at apex  Lungs: ???  Abd: slightly distended, soft  Ext: DP pulses not palpable but dopplerable per RN, no peripheral edema    Lines  Right arm PICC 3/30  Left radial art line 4/2  [place] 3/27      Wt Readings from Last 5 Encounters:   04/04/17 60.8 kg (134 lb 0.6 oz)   03/27/17 62 kg (136 lb 11 oz)   07/15/08 68.5 kg (151 lb)     CMP  Recent Labs  Lab 04/04/17  0424 04/03/17  2351 04/03/17  2159 04/03/17  2118 04/03/17  2001 04/03/17  1653  04/03/17  1216  04/03/17  0434  04/02/17  1532  04/02/17  0405  04/01/17  1207  04/01/17  0322   *  --   --   --   --  145*  --   --   --  143  --  146*  --  149*  < >  --   --  146*   POTASSIUM 3.9  4.1 4.0 3.9  --  3.7 3.8  3.7  < > 4.0  < " > 4.1  < > 3.6  3.5  < > 3.2*  3.2*  < >  --   < > 3.5  3.5   CHLORIDE 102  --   --   --   --  105  --   --   --  104  --  104  --  107  < >  --   --  103   CO2 34*  --   --   --   --  26  --   --   --  30  --  33*  --  31  < >  --   --  32   ANIONGAP 10  --   --   --   --  14  --   --   --  10  --  10  --  11  < >  --   --  10   *  --   --   --   --  151*  --   --   --  128*  --  162*  --  147*  < >  --   --  128*   BUN 47*  --   --   --   --  44*  --   --   --  46*  --  50*  --  56*  < >  --   --  67*   CR 2.31*  --   --   --   --  1.80*  --   --   --  1.73*  --  1.95*  --  2.41*  < >  --   --  3.38*   GFRESTIMATED 30*  --   --   --   --  40*  --   --   --  42*  --  37*  --  29*  < >  --   --  19*   GFRESTBLACK 36*  --   --   --   --  49*  --   --   --  51*  --  44*  --  35*  < >  --   --  24*   ROB 7.8*  --   --   --   --  7.4*  --   --   --  8.1*  --  7.9*  --  7.6*  < >  --   --  7.6*   MAG 3.0*  --   --   --   --  2.7*  --  2.0  --  2.3  --  2.3  --  2.4*  < > 2.2  --  2.3   PHOS  --   --   --   --   --   --   --  3.0  --   --   --  3.2  --   --   --  3.1  --  2.9   PROTTOTAL 6.7*  --   --  6.7*  --   --   --   --   --  6.1*  --   --   --  6.1*  --   --   --  5.9*   ALBUMIN 2.6*  --   --  2.6*  --   --   --   --   --  2.6*  --   --   --  2.6*  --   --   --  2.5*   BILITOTAL 1.6*  --   --  1.2  --   --   --   --   --  1.5*  --   --   --  0.7  --   --   --  1.0   ALKPHOS 79  --   --  88  --   --   --   --   --  72  --   --   --  68  --   --   --  74   *  --   --  136*  --   --   --   --   --  146*  --   --   --  284*  --   --   --  599*   *  --   --  798*  --   --   --   --   --  1001*  --   --   --  1396*  --   --   --  1990*   < > = values in this interval not displayed.  CBC    Recent Labs  Lab 04/04/17  0424 04/03/17  1758 04/03/17  0434 04/02/17  0405   WBC 14.9* 15.1* 14.2* 15.5*   RBC 3.11* 3.39* 3.43* 3.11*   HGB 9.6* 10.3* 10.3* 9.3*   HCT 29.6* 31.9* 32.7* 29.4*   MCV 95 94 95  95   MCH 30.9 30.4 30.0 29.9   MCHC 32.4 32.3 31.5 31.6   RDW 16.5* 16.2* 15.5* 14.5    169 129* 117*     INR    Recent Labs  Lab 04/04/17  0424 04/03/17  0434 04/02/17  0405 04/01/17  0322   INR 1.21* 1.12 1.18* 1.37*     Arterial Blood Gas  Recent Labs  Lab 04/04/17  0524 04/04/17  0424 04/03/17  2351 04/03/17 2001 04/03/17  0754  04/03/17  0630   PH  --   --   --   --   --  7.46*  --  7.46*   PCO2  --   --   --   --   --  40  --  43   PO2  --   --   --   --   --  95  --  105   HCO3  --   --   --   --   --  29*  --  30*   O2PER 2L 2L 2.0L 2L  < > 40.0  < > 40.0   < > = values in this interval not displayed.      Labs and Imaging  Reviewed in epic  Flu swab negative    Echo 3/27  The visual ejection fraction is estimated at 30-35%.  There is extensive inferior, inferoseptal, and inferolateral wall akinesis.  Basal/mid lateral wall hypokinesis  There is moderate to severe septal hypokinesis.  Septal wall motion abnormality may reflect pacemaker activation.  There is a catheter/pacemaker lead seen in the right ventricle.  The right ventricle is mildly dilated.  Severely decreased right ventricular systolic function  There is no pericardial effusion.  The rhythm was paced.  Compared to the prior study, the LVEF appears somewhat decreased. Septal wall  motion abnormality larger- may reflect, in part, that patient in sinus tach on  prior study, and ventricular paced now. No hemodynamically significant  valvular abnormalities on 2D or color flow imaging    CT chest/abdomen 3/27   IMPRESSION:   1. Small mediastinal hemorrhage, small bilateral pleural effusions  which may be hemorrhagic. Small intraperitoneal hemorrhage. Minimal  pericardial hemorrhage.  2. Minimal pneumoperitoneum in the left paracolic gutter, of unknown  significance. No pneumatosis as clinically questioned.  3. IABP slightly low in position.  4. Bilateral pulmonary dependent consolidations represent compression  atelectasis, however  differentials include aspiration.    Cath 3/26 [facility]  SUMMARY:   --Inferior STEMI w/ late presentation. Culprit dictated by complete  heart block, and cardiogenic shock. Emergent echo in the Cath Lab also  shows significant RV dysfunction.  --Three vessel coronary artery disease with diffuse coronary disease  out to the distal vessels  --Successful POBA of the prox and mid-RCA. Stent was not placed, in  anticipation he may need to be considered for bypass surgery in the  near future  --Insertion of a temporary pacemaker for bradycardia (AVB) and  hypotension  --Insertion of a IABP  --Upper GI bleed consistent with [place]    Cath [place] 3/27    CT head 3/28: normal     Echo 3/29  Interpretation Summary  Limited study. Ischemic CM. Moderately (EF 30-35%) reduced left ventricular  function is present. Traced at 32%.  Posterior wall akinesis is present. Lateral wall akinesis is present. Inferior  wall akinesis is present.  Right ventricular function, chamber size, wall motion, and thickness are  normal.  Dilation of the inferior vena cava is present with abnormal respiratory  variation in diameter.     Compared to prior study, RV fxn is improved.    Echo 3/31  Left Ventricle  The Ejection Fraction is estimated at 50-55%. Inferior,posterior,lateral,basal  septal wall akinesis as reported before. No change.    CORONARY ANGIOGRAM 4/1:   1. Both coronary arteries arise from their respective cusps.  2. Right dominant.  3. LM has mild luminal irregularities.   4. LAD supplies the apex along with the RCA (type 2 LAD) and gives rise to septal perforators and a large and branching D1. There are widely patent stents extending from the proximal to mid LAD. The dLAD has mild to moderate disease to 30% stenosis. The D1 is jailed by the LAD stents, but has EBER 3 flow with disease to 30% stenosis.    5. The small ramus is no longer present.  6. LCX is occluded at the ostium.   7. RCA gives rise to PL branches  and supplies PDA. There are widely patent stents extending from the proximal to mid RCA. The remainder of the mRCA has disease to 50% stenosis and the dRCA has disease to 10% stenosis. The RPDA has a widely patent stent and the remainder of the artery has mild disease to 10% stenosis. The RPLA has small vessels with diffuse disease including a distal branch occlusion. The RPLA supplies some small collateral branches to the dLCx.     COMPLICATIONS:  1. None     SUMMARY:   1. Cardiogenic shock following an inferior STEMI.  2. The LCx re-occlusion is not surprising as the recently revascularized LCx  had poor outflow and the revascularized portion did not supply a large territory (limited to no flow was present distal to the stented segment immediately following stenting). Repeat revascularization of the LCx would result in the same outcome of repeat closure and also risk embolizing thrombus from the LCx into the LAD so no treatment of the occluded LCx was performed.   3. Three vessel coronary artery disease with patent LAD and RCA stents and an occluded LCx.    Echo 4/3  Left ventricular size is normal.  The Ejection Fraction is estimated at 35-40%.  Inferior wall akinesis is present.  Lateral wall akinesis is present.  Posterior wall akinesis is present.  Right ventricular function, chamber size, wall motion, and thickness are  normal.  Moderate mitral insufficiency is present.  Moderate tricuspid insufficiency is present.  Right ventricular systolic pressure is 47mmHg above the right atrial pressure.  The inferior vena cava is normal.    CT abd without contrast 4/4  IMPRESSION:   1. No evidence of ischemic bowel, obstruction, or intra-abdominal  infection.  2. Bibasilar patchy pulmonary opacities likely represent combination  of aspiration and atelectasis.  3. Small amount of simple free fluid within the lower abdomen.  4. Small left retroperitoneal hematoma along the iliac vasculature  likely postprocedural.    5. Unchanged size of bilateral pleural effusions, which now consist of  simple fluid.    Plan for today  1) Nipride for afterload reduction to improve CI MAP goal 60-70 goal CI>2. Wean dobutamine eventually  2) Bumex 5mg bolus, Chlorthiazide 500mg IV goal CVP<12  2) Ct abdomen to rule out bowel ischemia and/or infection    Card  # Cardiogenic shock 4/3- from underlying 3v CAD-has had high SVR and low CI   # Mixed shock initially: Cardiogenic from biventricular failure from inferior STEMI and distributive shock from post-MI SIRS response vs aspiration pneumonia/pneumonitis from possible aspiration during cath at [facility] on 3/26  # Due to inferior wall STEMI. S/P 7 stents to LAD, circ, RCA (angiogram report pending). Now with IABP, temporary pacemaker after 3rd deg heart block in cath lab at [facility] on 3/26   # Small pericardial hemorrhage  - IABP removed 4/2  -temp pacer removed 3/29  - Dual antiplatelet therapy with ASA 81mg and plavix 75mg qday  -Hydralazine, Isordil  -eventually will start statin     # Neuro  -alert, follows commands        Respiratory  #Intubated for airway protection due to mental status and emesis on 3/26, extubated 4/3  #Probable Aspiration PNA/pneumonitis vs MSSA pneumonia  #Small mediastinal hemorrhage       # ID  -fever on 4/3-type 2 MI vs infection (unclear source)  . Sepsis from aspiration pneumonia/pneumonitis vs MSSA pneumonia vs post-MI SIRS response initially on 3/28  - repeat cultures if febrile  - was on Unasyn. Switched to vanco and zosyn on 4/3 for fever and elevated lactate concern for sepsis       # Endocrine  T2DM: HA1C 7.5 yesterday on admission. BG climibing to 170s since admission. Will likely continue to rise with steroids.   - Insulin gtt per nurising protocol with hypoglycemia precautions.      # GI  -possible ileus vs constipation-passing gas  -bowel regimen    Minimal pneumoperitoneum, unclear etiology: Seen by General surgery.  -had bloody NG output  earlier in hospitalization. Was on PPI BID switched to once daily 4/3   -Conservative Mx for now    Shock liver injury with coagulopathy-improving trend LFTs,   -Vit K 2.5mg 3/28, Vit K 3/29       # Renal/  Acute kidney injury- improving, nonoliguric: multifactorial: hypoperfusion, contrast etc.  -Diuresis  - Daily Cr  - Avoid nephrotoxins as able  - [place] placed 3/26    -Hypernatremia- resolved    #Hem  -thrombocytopenia-likely from balloon pump  -Anemia- multifactorial     FEN: feeding tube  Code: FULL  PPx:  PPI 40mg daily, senna PRN  Dispo: pending stability    Will staff with Dr. Talon Park  General Cardiology fellow, PGY4  Pager [phone]      I have reviewed today's vital signs, notes, medications, labs and imaging.  I have also seen and examined the patient and agree with the findings and plan as outlined above.  Pt with abd discomfort and febrile to 101.7,  and /66 with CVP 12, CI 1.6 and SVR 1876 on Dbx 5 mcg/kg/min with lungs clear and tachy S1 and S2 with LLQ abd discomfort to deep palpation.  Labs with WBC 14.9, Cr2.3,  and TnI 26.  Assessment: Pt with IWMI with revascularization with course complicated by cardiogenic shock now with fever of unclear etiology.  Will check abd CT in pt with hypotenssive episodde at outside hospital.  Agree with broad spectrum abx (vanc and zosyn), vasodilation with nipride and in 24 hrs if no improvement will d/c and place IABP.  Pt in critical condition with guarded prognosis.    Vikram Jean-Baptiste MD, PhD  Professor, Heart Failure and Cardiac Transplantation  [facility]    Pt seen X2 for total critical care time 30 min

## 2017-04-05 ENCOUNTER — APPOINTMENT (OUTPATIENT)
Dept: CARDIOLOGY | Facility: CLINIC | Age: 52
DRG: 228 | End: 2017-04-05
Attending: INTERNAL MEDICINE
Payer: COMMERCIAL

## 2017-04-05 ENCOUNTER — APPOINTMENT (OUTPATIENT)
Dept: GENERAL RADIOLOGY | Facility: CLINIC | Age: 52
DRG: 228 | End: 2017-04-05
Attending: INTERNAL MEDICINE
Payer: COMMERCIAL

## 2017-04-05 ENCOUNTER — OFFICE VISIT (OUTPATIENT)
Dept: INTERPRETER SERVICES | Facility: CLINIC | Age: 52
End: 2017-04-05

## 2017-04-05 LAB
ABO + RH BLD: NORMAL
ABO + RH BLD: NORMAL
ALBUMIN SERPL-MCNC: 2.7 G/DL (ref 3.4–5)
ALBUMIN UR-MCNC: NEGATIVE MG/DL
ALP SERPL-CCNC: 69 U/L (ref 40–150)
ALT SERPL W P-5'-P-CCNC: 531 U/L (ref 0–70)
ANION GAP SERPL CALCULATED.3IONS-SCNC: 14 MMOL/L (ref 3–14)
ANION GAP SERPL CALCULATED.3IONS-SCNC: 15 MMOL/L (ref 3–14)
APPEARANCE UR: CLEAR
APTT PPP: 31 SEC (ref 22–37)
AST SERPL W P-5'-P-CCNC: 95 U/L (ref 0–45)
BASE EXCESS BLDA CALC-SCNC: 6.8 MMOL/L
BASE EXCESS BLDV CALC-SCNC: 6.9 MMOL/L
BASE EXCESS BLDV CALC-SCNC: 7.8 MMOL/L
BASE EXCESS BLDV CALC-SCNC: 7.9 MMOL/L
BASE EXCESS BLDV CALC-SCNC: 8.2 MMOL/L
BASE EXCESS BLDV CALC-SCNC: 9 MMOL/L
BASE EXCESS BLDV CALC-SCNC: 9.3 MMOL/L
BILIRUB DIRECT SERPL-MCNC: 0.5 MG/DL (ref 0–0.2)
BILIRUB SERPL-MCNC: 1.3 MG/DL (ref 0.2–1.3)
BILIRUB UR QL STRIP: NEGATIVE
BLD GP AB SCN SERPL QL: NORMAL
BLOOD BANK CMNT PATIENT-IMP: NORMAL
BUN SERPL-MCNC: 74 MG/DL (ref 7–30)
BUN SERPL-MCNC: 92 MG/DL (ref 7–30)
CA-I BLD-MCNC: 3.8 MG/DL (ref 4.4–5.2)
CALCIUM SERPL-MCNC: 7.2 MG/DL (ref 8.5–10.1)
CALCIUM SERPL-MCNC: 7.3 MG/DL (ref 8.5–10.1)
CHLORIDE SERPL-SCNC: 103 MMOL/L (ref 94–109)
CHLORIDE SERPL-SCNC: 106 MMOL/L (ref 94–109)
CO2 SERPL-SCNC: 30 MMOL/L (ref 20–32)
CO2 SERPL-SCNC: 31 MMOL/L (ref 20–32)
COLOR UR AUTO: ABNORMAL
CREAT SERPL-MCNC: 3.77 MG/DL (ref 0.66–1.25)
CREAT SERPL-MCNC: 4.67 MG/DL (ref 0.66–1.25)
CYANIDE BLD-MCNC: NORMAL UG/DL
ERYTHROCYTE [DISTWIDTH] IN BLOOD BY AUTOMATED COUNT: 16.7 % (ref 10–15)
GFR SERPL CREATININE-BSD FRML MDRD: 13 ML/MIN/1.7M2
GFR SERPL CREATININE-BSD FRML MDRD: 17 ML/MIN/1.7M2
GLUCOSE BLDC GLUCOMTR-MCNC: 107 MG/DL (ref 70–99)
GLUCOSE BLDC GLUCOMTR-MCNC: 122 MG/DL (ref 70–99)
GLUCOSE BLDC GLUCOMTR-MCNC: 125 MG/DL (ref 70–99)
GLUCOSE BLDC GLUCOMTR-MCNC: 135 MG/DL (ref 70–99)
GLUCOSE BLDC GLUCOMTR-MCNC: 138 MG/DL (ref 70–99)
GLUCOSE BLDC GLUCOMTR-MCNC: 139 MG/DL (ref 70–99)
GLUCOSE BLDC GLUCOMTR-MCNC: 139 MG/DL (ref 70–99)
GLUCOSE BLDC GLUCOMTR-MCNC: 144 MG/DL (ref 70–99)
GLUCOSE BLDC GLUCOMTR-MCNC: 152 MG/DL (ref 70–99)
GLUCOSE BLDC GLUCOMTR-MCNC: 154 MG/DL (ref 70–99)
GLUCOSE BLDC GLUCOMTR-MCNC: 155 MG/DL (ref 70–99)
GLUCOSE BLDC GLUCOMTR-MCNC: 163 MG/DL (ref 70–99)
GLUCOSE BLDC GLUCOMTR-MCNC: 168 MG/DL (ref 70–99)
GLUCOSE BLDC GLUCOMTR-MCNC: 235 MG/DL (ref 70–99)
GLUCOSE SERPL-MCNC: 127 MG/DL (ref 70–99)
GLUCOSE SERPL-MCNC: 144 MG/DL (ref 70–99)
GLUCOSE UR STRIP-MCNC: NEGATIVE MG/DL
HCO3 BLD-SCNC: 31 MMOL/L (ref 21–28)
HCO3 BLDV-SCNC: 33 MMOL/L (ref 21–28)
HCO3 BLDV-SCNC: 34 MMOL/L (ref 21–28)
HCO3 BLDV-SCNC: 35 MMOL/L (ref 21–28)
HCT VFR BLD AUTO: 30.4 % (ref 40–53)
HGB BLD-MCNC: 9.5 G/DL (ref 13.3–17.7)
HGB UR QL STRIP: ABNORMAL
HYALINE CASTS #/AREA URNS LPF: 1 /LPF (ref 0–2)
INR PPP: 1.24 (ref 0.86–1.14)
KETONES UR STRIP-MCNC: NEGATIVE MG/DL
LACTATE BLD-SCNC: 1.6 MMOL/L (ref 0.7–2.1)
LACTATE BLD-SCNC: 1.8 MMOL/L (ref 0.7–2.1)
LACTATE BLD-SCNC: 2 MMOL/L (ref 0.7–2.1)
LEUKOCYTE ESTERASE UR QL STRIP: ABNORMAL
LIPASE SERPL-CCNC: 1614 U/L (ref 73–393)
LMWH PPP CHRO-ACNC: 0.91 IU/ML
MAGNESIUM SERPL-MCNC: 3.3 MG/DL (ref 1.6–2.3)
MAGNESIUM SERPL-MCNC: 3.3 MG/DL (ref 1.6–2.3)
MCH RBC QN AUTO: 29.9 PG (ref 26.5–33)
MCHC RBC AUTO-ENTMCNC: 31.3 G/DL (ref 31.5–36.5)
MCV RBC AUTO: 96 FL (ref 78–100)
MUCOUS THREADS #/AREA URNS LPF: PRESENT /LPF
NITRATE UR QL: NEGATIVE
O2/TOTAL GAS SETTING VFR VENT: ABNORMAL %
OXYHGB MFR BLDV: 44 %
OXYHGB MFR BLDV: 44 %
OXYHGB MFR BLDV: 46 %
OXYHGB MFR BLDV: 47 %
OXYHGB MFR BLDV: 49 %
OXYHGB MFR BLDV: 50 %
OXYHGB MFR BLDV: 52 %
OXYHGB MFR BLDV: 54 %
PCO2 BLD: 41 MM HG (ref 35–45)
PCO2 BLDV: 49 MM HG (ref 40–50)
PCO2 BLDV: 52 MM HG (ref 40–50)
PCO2 BLDV: 52 MM HG (ref 40–50)
PCO2 BLDV: 53 MM HG (ref 40–50)
PCO2 BLDV: 53 MM HG (ref 40–50)
PCO2 BLDV: 54 MM HG (ref 40–50)
PCO2 BLDV: 54 MM HG (ref 40–50)
PCO2 BLDV: 55 MM HG (ref 40–50)
PH BLD: 7.48 PH (ref 7.35–7.45)
PH BLDV: 7.39 PH (ref 7.32–7.43)
PH BLDV: 7.39 PH (ref 7.32–7.43)
PH BLDV: 7.4 PH (ref 7.32–7.43)
PH BLDV: 7.42 PH (ref 7.32–7.43)
PH BLDV: 7.43 PH (ref 7.32–7.43)
PH BLDV: 7.45 PH (ref 7.32–7.43)
PH UR STRIP: 5 PH (ref 5–7)
PHOSPHATE SERPL-MCNC: 6 MG/DL (ref 2.5–4.5)
PLATELET # BLD AUTO: 175 10E9/L (ref 150–450)
PO2 BLD: 84 MM HG (ref 80–105)
PO2 BLDV: 29 MM HG (ref 25–47)
PO2 BLDV: 30 MM HG (ref 25–47)
PO2 BLDV: 31 MM HG (ref 25–47)
PO2 BLDV: 32 MM HG (ref 25–47)
PO2 BLDV: 32 MM HG (ref 25–47)
PO2 BLDV: 35 MM HG (ref 25–47)
POTASSIUM BLD-SCNC: 3.5 MMOL/L (ref 3.4–5.3)
POTASSIUM BLD-SCNC: 3.6 MMOL/L (ref 3.4–5.3)
POTASSIUM BLD-SCNC: 3.8 MMOL/L (ref 3.4–5.3)
POTASSIUM BLD-SCNC: 3.9 MMOL/L (ref 3.4–5.3)
POTASSIUM BLD-SCNC: 3.9 MMOL/L (ref 3.4–5.3)
POTASSIUM SERPL-SCNC: 3.5 MMOL/L (ref 3.4–5.3)
POTASSIUM SERPL-SCNC: 3.8 MMOL/L (ref 3.4–5.3)
PROT SERPL-MCNC: 6.9 G/DL (ref 6.8–8.8)
RBC # BLD AUTO: 3.18 10E12/L (ref 4.4–5.9)
RBC #/AREA URNS AUTO: 51 /HPF (ref 0–2)
SODIUM SERPL-SCNC: 148 MMOL/L (ref 133–144)
SODIUM SERPL-SCNC: 151 MMOL/L (ref 133–144)
SP GR UR STRIP: 1.01 (ref 1–1.03)
SPECIMEN EXP DATE BLD: NORMAL
THIOCYANATE SERPL-MCNC: NORMAL UG/ML
TROPONIN I SERPL-MCNC: 23.71 UG/L (ref 0–0.04)
TROPONIN I SERPL-MCNC: 25.61 UG/L (ref 0–0.04)
URN SPEC COLLECT METH UR: ABNORMAL
UROBILINOGEN UR STRIP-MCNC: NORMAL MG/DL (ref 0–2)
VANCOMYCIN SERPL-MCNC: 25.3 MG/L
WBC # BLD AUTO: 14.5 10E9/L (ref 4–11)
WBC #/AREA URNS AUTO: 5 /HPF (ref 0–2)

## 2017-04-05 PROCEDURE — 84484 ASSAY OF TROPONIN QUANT: CPT | Performed by: INTERNAL MEDICINE

## 2017-04-05 PROCEDURE — 85520 HEPARIN ASSAY: CPT | Performed by: INTERNAL MEDICINE

## 2017-04-05 PROCEDURE — 81001 URINALYSIS AUTO W/SCOPE: CPT | Performed by: INTERNAL MEDICINE

## 2017-04-05 PROCEDURE — 40000076 ZZH STATISTIC IABP MONITORING

## 2017-04-05 PROCEDURE — 93325 DOPPLER ECHO COLOR FLOW MAPG: CPT | Mod: 26 | Performed by: INTERNAL MEDICINE

## 2017-04-05 PROCEDURE — 25000128 H RX IP 250 OP 636: Performed by: INTERNAL MEDICINE

## 2017-04-05 PROCEDURE — 82248 BILIRUBIN DIRECT: CPT | Performed by: INTERNAL MEDICINE

## 2017-04-05 PROCEDURE — T1013 SIGN LANG/ORAL INTERPRETER: HCPCS | Mod: U3

## 2017-04-05 PROCEDURE — 40000275 ZZH STATISTIC RCP TIME EA 10 MIN

## 2017-04-05 PROCEDURE — 40000048 ZZH STATISTIC DAILY SWAN MONITORING

## 2017-04-05 PROCEDURE — 27210305 ZZH CATH BALLOON IABP

## 2017-04-05 PROCEDURE — 85610 PROTHROMBIN TIME: CPT | Performed by: INTERNAL MEDICINE

## 2017-04-05 PROCEDURE — 00000146 ZZHCL STATISTIC GLUCOSE BY METER IP

## 2017-04-05 PROCEDURE — 25000132 ZZH RX MED GY IP 250 OP 250 PS 637: Performed by: INTERNAL MEDICINE

## 2017-04-05 PROCEDURE — 82330 ASSAY OF CALCIUM: CPT | Performed by: INTERNAL MEDICINE

## 2017-04-05 PROCEDURE — 83690 ASSAY OF LIPASE: CPT | Performed by: INTERNAL MEDICINE

## 2017-04-05 PROCEDURE — 86900 BLOOD TYPING SEROLOGIC ABO: CPT | Performed by: INTERNAL MEDICINE

## 2017-04-05 PROCEDURE — 40000081 ZZH STATISTIC INSERT IABP

## 2017-04-05 PROCEDURE — 25000125 ZZHC RX 250: Performed by: INTERNAL MEDICINE

## 2017-04-05 PROCEDURE — 27210437 ZZH NUTRITION PRODUCT SEMIELEM INTERMED LITER

## 2017-04-05 PROCEDURE — C1769 GUIDE WIRE: HCPCS

## 2017-04-05 PROCEDURE — 27210982 ZZH KIT RT HC TOTES DISP CR7

## 2017-04-05 PROCEDURE — 25000132 ZZH RX MED GY IP 250 OP 250 PS 637: Performed by: STUDENT IN AN ORGANIZED HEALTH CARE EDUCATION/TRAINING PROGRAM

## 2017-04-05 PROCEDURE — 93308 TTE F-UP OR LMTD: CPT | Mod: 26 | Performed by: INTERNAL MEDICINE

## 2017-04-05 PROCEDURE — 27210807 ZZH SHEATH CR6

## 2017-04-05 PROCEDURE — 93308 TTE F-UP OR LMTD: CPT

## 2017-04-05 PROCEDURE — 82805 BLOOD GASES W/O2 SATURATION: CPT | Performed by: INTERNAL MEDICINE

## 2017-04-05 PROCEDURE — 86850 RBC ANTIBODY SCREEN: CPT | Performed by: INTERNAL MEDICINE

## 2017-04-05 PROCEDURE — 40000940 XR CHEST PORT 1 VW

## 2017-04-05 PROCEDURE — 20000004 ZZH R&B ICU UMMC

## 2017-04-05 PROCEDURE — S0171 BUMETANIDE 0.5 MG: HCPCS | Performed by: STUDENT IN AN ORGANIZED HEALTH CARE EDUCATION/TRAINING PROGRAM

## 2017-04-05 PROCEDURE — 5A02210 ASSISTANCE WITH CARDIAC OUTPUT USING BALLOON PUMP, CONTINUOUS: ICD-10-PCS | Performed by: INTERNAL MEDICINE

## 2017-04-05 PROCEDURE — 85027 COMPLETE CBC AUTOMATED: CPT | Performed by: INTERNAL MEDICINE

## 2017-04-05 PROCEDURE — 25000128 H RX IP 250 OP 636: Performed by: STUDENT IN AN ORGANIZED HEALTH CARE EDUCATION/TRAINING PROGRAM

## 2017-04-05 PROCEDURE — 80053 COMPREHEN METABOLIC PANEL: CPT | Performed by: INTERNAL MEDICINE

## 2017-04-05 PROCEDURE — 80048 BASIC METABOLIC PNL TOTAL CA: CPT | Performed by: INTERNAL MEDICINE

## 2017-04-05 PROCEDURE — 25000125 ZZHC RX 250: Performed by: STUDENT IN AN ORGANIZED HEALTH CARE EDUCATION/TRAINING PROGRAM

## 2017-04-05 PROCEDURE — 85730 THROMBOPLASTIN TIME PARTIAL: CPT | Performed by: INTERNAL MEDICINE

## 2017-04-05 PROCEDURE — 84100 ASSAY OF PHOSPHORUS: CPT | Performed by: INTERNAL MEDICINE

## 2017-04-05 PROCEDURE — 83605 ASSAY OF LACTIC ACID: CPT | Performed by: INTERNAL MEDICINE

## 2017-04-05 PROCEDURE — 84132 ASSAY OF SERUM POTASSIUM: CPT | Performed by: INTERNAL MEDICINE

## 2017-04-05 PROCEDURE — 93005 ELECTROCARDIOGRAM TRACING: CPT

## 2017-04-05 PROCEDURE — 82600 ASSAY OF CYANIDE: CPT | Performed by: INTERNAL MEDICINE

## 2017-04-05 PROCEDURE — 33967 INSERT I-AORT PERCUT DEVICE: CPT

## 2017-04-05 PROCEDURE — 33967 INSERT I-AORT PERCUT DEVICE: CPT | Mod: GC | Performed by: INTERNAL MEDICINE

## 2017-04-05 PROCEDURE — 83735 ASSAY OF MAGNESIUM: CPT | Performed by: INTERNAL MEDICINE

## 2017-04-05 PROCEDURE — 99291 CRITICAL CARE FIRST HOUR: CPT | Mod: GC | Performed by: INTERNAL MEDICINE

## 2017-04-05 PROCEDURE — S0171 BUMETANIDE 0.5 MG: HCPCS | Performed by: INTERNAL MEDICINE

## 2017-04-05 PROCEDURE — 93321 DOPPLER ECHO F-UP/LMTD STD: CPT | Mod: 26 | Performed by: INTERNAL MEDICINE

## 2017-04-05 PROCEDURE — 86901 BLOOD TYPING SEROLOGIC RH(D): CPT | Performed by: INTERNAL MEDICINE

## 2017-04-05 PROCEDURE — 25000128 H RX IP 250 OP 636

## 2017-04-05 PROCEDURE — 82803 BLOOD GASES ANY COMBINATION: CPT | Performed by: INTERNAL MEDICINE

## 2017-04-05 PROCEDURE — 36415 COLL VENOUS BLD VENIPUNCTURE: CPT | Performed by: INTERNAL MEDICINE

## 2017-04-05 PROCEDURE — 84430 ASSAY OF THIOCYANATE: CPT | Performed by: INTERNAL MEDICINE

## 2017-04-05 PROCEDURE — 71010 XR CHEST PORT 1 VW: CPT

## 2017-04-05 PROCEDURE — 87040 BLOOD CULTURE FOR BACTERIA: CPT | Performed by: INTERNAL MEDICINE

## 2017-04-05 PROCEDURE — 80202 ASSAY OF VANCOMYCIN: CPT | Performed by: INTERNAL MEDICINE

## 2017-04-05 PROCEDURE — 40000281 ZZH STATISTIC TRANSPORT TIME EA 15 MIN

## 2017-04-05 PROCEDURE — 25000132 ZZH RX MED GY IP 250 OP 250 PS 637

## 2017-04-05 RX ORDER — NITROGLYCERIN 5 MG/ML
100-200 VIAL (ML) INTRAVENOUS
Status: DISCONTINUED | OUTPATIENT
Start: 2017-04-05 | End: 2017-04-05

## 2017-04-05 RX ORDER — DOPAMINE HYDROCHLORIDE 160 MG/100ML
2-20 INJECTION, SOLUTION INTRAVENOUS CONTINUOUS
Status: DISCONTINUED | OUTPATIENT
Start: 2017-04-05 | End: 2017-04-05

## 2017-04-05 RX ORDER — ADENOSINE 3 MG/ML
12-12000 INJECTION, SOLUTION INTRAVENOUS
Status: DISCONTINUED | OUTPATIENT
Start: 2017-04-05 | End: 2017-04-05

## 2017-04-05 RX ORDER — DOPAMINE HYDROCHLORIDE 160 MG/100ML
INJECTION, SOLUTION INTRAVENOUS
Status: DISCONTINUED
Start: 2017-04-05 | End: 2017-04-13 | Stop reason: HOSPADM

## 2017-04-05 RX ORDER — NITROGLYCERIN 5 MG/ML
100-500 VIAL (ML) INTRAVENOUS
Status: DISCONTINUED | OUTPATIENT
Start: 2017-04-05 | End: 2017-04-05

## 2017-04-05 RX ORDER — NITROGLYCERIN 0.4 MG/1
0.4 TABLET SUBLINGUAL EVERY 5 MIN PRN
Status: DISCONTINUED | OUTPATIENT
Start: 2017-04-05 | End: 2017-04-05

## 2017-04-05 RX ORDER — ATROPINE SULFATE 0.1 MG/ML
.5-1 INJECTION INTRAVENOUS
Status: DISCONTINUED | OUTPATIENT
Start: 2017-04-05 | End: 2017-04-05

## 2017-04-05 RX ORDER — DEXTROSE MONOHYDRATE 25 G/50ML
12.5-5 INJECTION, SOLUTION INTRAVENOUS EVERY 30 MIN PRN
Status: DISCONTINUED | OUTPATIENT
Start: 2017-04-05 | End: 2017-04-05

## 2017-04-05 RX ORDER — HYDRALAZINE HYDROCHLORIDE 20 MG/ML
10-20 INJECTION INTRAMUSCULAR; INTRAVENOUS
Status: DISCONTINUED | OUTPATIENT
Start: 2017-04-05 | End: 2017-04-05

## 2017-04-05 RX ORDER — DOBUTAMINE HYDROCHLORIDE 200 MG/100ML
2-20 INJECTION INTRAVENOUS CONTINUOUS PRN
Status: DISCONTINUED | OUTPATIENT
Start: 2017-04-05 | End: 2017-04-05

## 2017-04-05 RX ORDER — VERAPAMIL HYDROCHLORIDE 2.5 MG/ML
1-5 INJECTION, SOLUTION INTRAVENOUS
Status: DISCONTINUED | OUTPATIENT
Start: 2017-04-05 | End: 2017-04-05

## 2017-04-05 RX ORDER — POTASSIUM CHLORIDE 7.45 MG/ML
10 INJECTION INTRAVENOUS
Status: DISCONTINUED | OUTPATIENT
Start: 2017-04-05 | End: 2017-04-05

## 2017-04-05 RX ORDER — PROTAMINE SULFATE 10 MG/ML
1-5 INJECTION, SOLUTION INTRAVENOUS
Status: DISCONTINUED | OUTPATIENT
Start: 2017-04-05 | End: 2017-04-05

## 2017-04-05 RX ORDER — HEPARIN SODIUM 1000 [USP'U]/ML
1000-10000 INJECTION, SOLUTION INTRAVENOUS; SUBCUTANEOUS EVERY 5 MIN PRN
Status: DISCONTINUED | OUTPATIENT
Start: 2017-04-05 | End: 2017-04-05

## 2017-04-05 RX ORDER — DOPAMINE HYDROCHLORIDE 160 MG/100ML
2-20 INJECTION, SOLUTION INTRAVENOUS CONTINUOUS PRN
Status: DISCONTINUED | OUTPATIENT
Start: 2017-04-05 | End: 2017-04-05

## 2017-04-05 RX ORDER — LORAZEPAM 2 MG/ML
.5-2 INJECTION INTRAMUSCULAR EVERY 4 HOURS PRN
Status: DISCONTINUED | OUTPATIENT
Start: 2017-04-05 | End: 2017-04-05

## 2017-04-05 RX ORDER — METHYLPREDNISOLONE SODIUM SUCCINATE 125 MG/2ML
125 INJECTION, POWDER, LYOPHILIZED, FOR SOLUTION INTRAMUSCULAR; INTRAVENOUS
Status: DISCONTINUED | OUTPATIENT
Start: 2017-04-05 | End: 2017-04-05

## 2017-04-05 RX ORDER — PHENYLEPHRINE HCL IN 0.9% NACL 1 MG/10 ML
20-100 SYRINGE (ML) INTRAVENOUS
Status: DISCONTINUED | OUTPATIENT
Start: 2017-04-05 | End: 2017-04-05

## 2017-04-05 RX ORDER — SODIUM NITROPRUSSIDE 25 MG/ML
100-200 INJECTION INTRAVENOUS
Status: DISCONTINUED | OUTPATIENT
Start: 2017-04-05 | End: 2017-04-05

## 2017-04-05 RX ORDER — NIFEDIPINE 10 MG/1
10 CAPSULE ORAL
Status: DISCONTINUED | OUTPATIENT
Start: 2017-04-05 | End: 2017-04-05

## 2017-04-05 RX ORDER — METOPROLOL TARTRATE 1 MG/ML
5 INJECTION, SOLUTION INTRAVENOUS EVERY 5 MIN PRN
Status: DISCONTINUED | OUTPATIENT
Start: 2017-04-05 | End: 2017-04-05

## 2017-04-05 RX ORDER — EPTIFIBATIDE 2 MG/ML
180 INJECTION, SOLUTION INTRAVENOUS EVERY 10 MIN PRN
Status: DISCONTINUED | OUTPATIENT
Start: 2017-04-05 | End: 2017-04-05

## 2017-04-05 RX ORDER — CLOPIDOGREL BISULFATE 75 MG/1
75 TABLET ORAL
Status: DISCONTINUED | OUTPATIENT
Start: 2017-04-05 | End: 2017-04-05

## 2017-04-05 RX ORDER — ARGATROBAN 1 MG/ML
150 INJECTION, SOLUTION INTRAVENOUS
Status: DISCONTINUED | OUTPATIENT
Start: 2017-04-05 | End: 2017-04-05

## 2017-04-05 RX ORDER — LIDOCAINE HYDROCHLORIDE 10 MG/ML
30 INJECTION, SOLUTION EPIDURAL; INFILTRATION; INTRACAUDAL; PERINEURAL
Status: DISCONTINUED | OUTPATIENT
Start: 2017-04-05 | End: 2017-04-05

## 2017-04-05 RX ORDER — SODIUM CHLORIDE 9 MG/ML
INJECTION, SOLUTION INTRAVENOUS
Status: COMPLETED
Start: 2017-04-05 | End: 2017-04-05

## 2017-04-05 RX ORDER — PIPERACILLIN SODIUM, TAZOBACTAM SODIUM 2; .25 G/10ML; G/10ML
2.25 INJECTION, POWDER, LYOPHILIZED, FOR SOLUTION INTRAVENOUS EVERY 6 HOURS
Status: DISCONTINUED | OUTPATIENT
Start: 2017-04-06 | End: 2017-04-07

## 2017-04-05 RX ORDER — FLUMAZENIL 0.1 MG/ML
0.2 INJECTION, SOLUTION INTRAVENOUS
Status: DISCONTINUED | OUTPATIENT
Start: 2017-04-05 | End: 2017-04-05

## 2017-04-05 RX ORDER — CLOPIDOGREL BISULFATE 75 MG/1
300-600 TABLET ORAL
Status: DISCONTINUED | OUTPATIENT
Start: 2017-04-05 | End: 2017-04-05

## 2017-04-05 RX ORDER — ASPIRIN 81 MG/1
81-324 TABLET, CHEWABLE ORAL
Status: DISCONTINUED | OUTPATIENT
Start: 2017-04-05 | End: 2017-04-05

## 2017-04-05 RX ORDER — HEPARIN SODIUM 10000 [USP'U]/100ML
0-3500 INJECTION, SOLUTION INTRAVENOUS CONTINUOUS
Status: DISCONTINUED | OUTPATIENT
Start: 2017-04-05 | End: 2017-04-11

## 2017-04-05 RX ORDER — NITROGLYCERIN 20 MG/100ML
0.07-2 INJECTION INTRAVENOUS CONTINUOUS
Status: DISCONTINUED | OUTPATIENT
Start: 2017-04-05 | End: 2017-04-05

## 2017-04-05 RX ORDER — HYDROMORPHONE HYDROCHLORIDE 1 MG/ML
.3-.5 INJECTION, SOLUTION INTRAMUSCULAR; INTRAVENOUS; SUBCUTANEOUS
Status: DISCONTINUED | OUTPATIENT
Start: 2017-04-05 | End: 2017-05-12 | Stop reason: HOSPADM

## 2017-04-05 RX ORDER — NICARDIPINE HYDROCHLORIDE 2.5 MG/ML
100 INJECTION INTRAVENOUS
Status: DISCONTINUED | OUTPATIENT
Start: 2017-04-05 | End: 2017-04-05

## 2017-04-05 RX ORDER — PROMETHAZINE HYDROCHLORIDE 25 MG/ML
6.25-25 INJECTION, SOLUTION INTRAMUSCULAR; INTRAVENOUS EVERY 4 HOURS PRN
Status: DISCONTINUED | OUTPATIENT
Start: 2017-04-05 | End: 2017-04-05

## 2017-04-05 RX ORDER — NALOXONE HYDROCHLORIDE 0.4 MG/ML
0.4 INJECTION, SOLUTION INTRAMUSCULAR; INTRAVENOUS; SUBCUTANEOUS EVERY 5 MIN PRN
Status: DISCONTINUED | OUTPATIENT
Start: 2017-04-05 | End: 2017-04-05

## 2017-04-05 RX ORDER — BUMETANIDE 0.25 MG/ML
5 INJECTION INTRAMUSCULAR; INTRAVENOUS ONCE
Status: COMPLETED | OUTPATIENT
Start: 2017-04-05 | End: 2017-04-05

## 2017-04-05 RX ORDER — FUROSEMIDE 10 MG/ML
20-100 INJECTION INTRAMUSCULAR; INTRAVENOUS
Status: DISCONTINUED | OUTPATIENT
Start: 2017-04-05 | End: 2017-04-05

## 2017-04-05 RX ORDER — ARGATROBAN 1 MG/ML
350 INJECTION, SOLUTION INTRAVENOUS
Status: DISCONTINUED | OUTPATIENT
Start: 2017-04-05 | End: 2017-04-05

## 2017-04-05 RX ORDER — EPTIFIBATIDE 2 MG/ML
1 INJECTION, SOLUTION INTRAVENOUS CONTINUOUS PRN
Status: DISCONTINUED | OUTPATIENT
Start: 2017-04-05 | End: 2017-04-05

## 2017-04-05 RX ORDER — PROTAMINE SULFATE 10 MG/ML
25-100 INJECTION, SOLUTION INTRAVENOUS EVERY 5 MIN PRN
Status: DISCONTINUED | OUTPATIENT
Start: 2017-04-05 | End: 2017-04-05

## 2017-04-05 RX ORDER — ENALAPRILAT 1.25 MG/ML
1.25-2.5 INJECTION INTRAVENOUS
Status: DISCONTINUED | OUTPATIENT
Start: 2017-04-05 | End: 2017-04-05

## 2017-04-05 RX ORDER — ONDANSETRON 2 MG/ML
4 INJECTION INTRAMUSCULAR; INTRAVENOUS EVERY 4 HOURS PRN
Status: DISCONTINUED | OUTPATIENT
Start: 2017-04-05 | End: 2017-04-05

## 2017-04-05 RX ORDER — ASPIRIN 325 MG
325 TABLET ORAL
Status: DISCONTINUED | OUTPATIENT
Start: 2017-04-05 | End: 2017-04-05

## 2017-04-05 RX ORDER — POTASSIUM CHLORIDE 29.8 MG/ML
20 INJECTION INTRAVENOUS
Status: DISCONTINUED | OUTPATIENT
Start: 2017-04-05 | End: 2017-04-05

## 2017-04-05 RX ORDER — PRASUGREL 10 MG/1
10-60 TABLET, FILM COATED ORAL
Status: DISCONTINUED | OUTPATIENT
Start: 2017-04-05 | End: 2017-04-05

## 2017-04-05 RX ORDER — NITROGLYCERIN 20 MG/100ML
.07-2 INJECTION INTRAVENOUS CONTINUOUS PRN
Status: DISCONTINUED | OUTPATIENT
Start: 2017-04-05 | End: 2017-04-05

## 2017-04-05 RX ORDER — DIPHENHYDRAMINE HYDROCHLORIDE 50 MG/ML
25-50 INJECTION INTRAMUSCULAR; INTRAVENOUS
Status: DISCONTINUED | OUTPATIENT
Start: 2017-04-05 | End: 2017-04-05

## 2017-04-05 RX ADMIN — CHLOROTHIAZIDE SODIUM 500 MG: 500 INJECTION, POWDER, LYOPHILIZED, FOR SOLUTION INTRAVENOUS at 17:17

## 2017-04-05 RX ADMIN — CLOPIDOGREL 75 MG: 75 TABLET, FILM COATED ORAL at 08:03

## 2017-04-05 RX ADMIN — OXYCODONE HYDROCHLORIDE 10 MG: 5 TABLET ORAL at 19:25

## 2017-04-05 RX ADMIN — POTASSIUM CHLORIDE 20 MEQ: 29.8 INJECTION, SOLUTION INTRAVENOUS at 12:39

## 2017-04-05 RX ADMIN — CALCIUM GLUCONATE 1 G: 94 INJECTION, SOLUTION INTRAVENOUS at 04:33

## 2017-04-05 RX ADMIN — HYDROMORPHONE HYDROCHLORIDE 0.5 MG: 1 INJECTION, SOLUTION INTRAMUSCULAR; INTRAVENOUS; SUBCUTANEOUS at 14:30

## 2017-04-05 RX ADMIN — OXYCODONE HYDROCHLORIDE 10 MG: 5 TABLET ORAL at 13:32

## 2017-04-05 RX ADMIN — HEPARIN SODIUM 5000 UNITS: 5000 INJECTION, SOLUTION INTRAVENOUS; SUBCUTANEOUS at 08:03

## 2017-04-05 RX ADMIN — DOBUTAMINE 7.5 MCG/KG/MIN: 12.5 INJECTION, SOLUTION, CONCENTRATE INTRAVENOUS at 06:25

## 2017-04-05 RX ADMIN — ASPIRIN 81 MG CHEWABLE TABLET 81 MG: 81 TABLET CHEWABLE at 08:03

## 2017-04-05 RX ADMIN — HUMAN INSULIN 4 UNITS/HR: 100 INJECTION, SOLUTION SUBCUTANEOUS at 19:07

## 2017-04-05 RX ADMIN — MULTIVIT AND MINERALS-FERROUS GLUCONATE 9 MG IRON/15 ML ORAL LIQUID 15 ML: at 08:03

## 2017-04-05 RX ADMIN — NITROGLYCERIN 0.07 MCG/KG/MIN: 20 INJECTION INTRAVENOUS at 08:57

## 2017-04-05 RX ADMIN — Medication 3 MG/HR: at 07:36

## 2017-04-05 RX ADMIN — HYDROMORPHONE HYDROCHLORIDE 0.5 MG: 1 INJECTION, SOLUTION INTRAMUSCULAR; INTRAVENOUS; SUBCUTANEOUS at 21:29

## 2017-04-05 RX ADMIN — OXYCODONE HYDROCHLORIDE 10 MG: 5 TABLET ORAL at 04:00

## 2017-04-05 RX ADMIN — HYDROMORPHONE HYDROCHLORIDE 0.5 MG: 1 INJECTION, SOLUTION INTRAMUSCULAR; INTRAVENOUS; SUBCUTANEOUS at 11:14

## 2017-04-05 RX ADMIN — BUMETANIDE 5 MG: 0.25 INJECTION, SOLUTION INTRAMUSCULAR; INTRAVENOUS at 16:48

## 2017-04-05 RX ADMIN — BUMETANIDE 5 MG: 0.25 INJECTION, SOLUTION INTRAMUSCULAR; INTRAVENOUS at 02:51

## 2017-04-05 RX ADMIN — OXYCODONE HYDROCHLORIDE 10 MG: 5 TABLET ORAL at 09:24

## 2017-04-05 RX ADMIN — PIPERACILLIN SODIUM,TAZOBACTAM SODIUM 3.38 G: 3; .375 INJECTION, POWDER, FOR SOLUTION INTRAVENOUS at 00:17

## 2017-04-05 RX ADMIN — ISOSORBIDE DINITRATE 40 MG: 20 TABLET ORAL at 11:04

## 2017-04-05 RX ADMIN — PIPERACILLIN SODIUM,TAZOBACTAM SODIUM 3.38 G: 3; .375 INJECTION, POWDER, FOR SOLUTION INTRAVENOUS at 05:58

## 2017-04-05 RX ADMIN — HYDROMORPHONE HYDROCHLORIDE 0.5 MG: 1 INJECTION, SOLUTION INTRAMUSCULAR; INTRAVENOUS; SUBCUTANEOUS at 06:25

## 2017-04-05 RX ADMIN — ISOSORBIDE DINITRATE 40 MG: 20 TABLET ORAL at 08:03

## 2017-04-05 RX ADMIN — POTASSIUM CHLORIDE 20 MEQ: 29.8 INJECTION, SOLUTION INTRAVENOUS at 09:24

## 2017-04-05 RX ADMIN — POLYETHYLENE GLYCOL 3350 17 G: 17 POWDER, FOR SOLUTION ORAL at 08:04

## 2017-04-05 RX ADMIN — SENNOSIDES AND DOCUSATE SODIUM 1 TABLET: 8.6; 5 TABLET ORAL at 19:25

## 2017-04-05 RX ADMIN — Medication 3 MG/HR: at 16:39

## 2017-04-05 RX ADMIN — DOPAMINE HYDROCHLORIDE 2.5 MCG/KG/MIN: 160 INJECTION, SOLUTION INTRAVENOUS at 13:14

## 2017-04-05 RX ADMIN — HUMAN INSULIN 1 UNITS/HR: 100 INJECTION, SOLUTION SUBCUTANEOUS at 11:56

## 2017-04-05 RX ADMIN — POTASSIUM CHLORIDE 20 MEQ: 1.5 POWDER, FOR SOLUTION ORAL at 23:07

## 2017-04-05 RX ADMIN — PIPERACILLIN AND TAZOBACTAM 2.25 G: 2; .25 INJECTION, POWDER, LYOPHILIZED, FOR SOLUTION INTRAVENOUS; PARENTERAL at 23:07

## 2017-04-05 RX ADMIN — HUMAN INSULIN 6 UNITS/HR: 100 INJECTION, SOLUTION SUBCUTANEOUS at 07:24

## 2017-04-05 RX ADMIN — PIPERACILLIN SODIUM,TAZOBACTAM SODIUM 3.38 G: 3; .375 INJECTION, POWDER, FOR SOLUTION INTRAVENOUS at 17:44

## 2017-04-05 RX ADMIN — OXYCODONE HYDROCHLORIDE 10 MG: 5 TABLET ORAL at 00:00

## 2017-04-05 RX ADMIN — HYDRALAZINE HYDROCHLORIDE 50 MG: 50 TABLET ORAL at 11:04

## 2017-04-05 RX ADMIN — PIPERACILLIN SODIUM,TAZOBACTAM SODIUM 3.38 G: 3; .375 INJECTION, POWDER, FOR SOLUTION INTRAVENOUS at 11:04

## 2017-04-05 RX ADMIN — POTASSIUM CHLORIDE 20 MEQ: 29.8 INJECTION, SOLUTION INTRAVENOUS at 18:20

## 2017-04-05 RX ADMIN — SENNOSIDES AND DOCUSATE SODIUM 2 TABLET: 8.6; 5 TABLET ORAL at 08:03

## 2017-04-05 RX ADMIN — CHLOROTHIAZIDE SODIUM 500 MG: 500 INJECTION, POWDER, LYOPHILIZED, FOR SOLUTION INTRAVENOUS at 00:53

## 2017-04-05 RX ADMIN — POTASSIUM CHLORIDE 20 MEQ: 29.8 INJECTION, SOLUTION INTRAVENOUS at 04:12

## 2017-04-05 RX ADMIN — HYDROMORPHONE HYDROCHLORIDE 0.5 MG: 1 INJECTION, SOLUTION INTRAMUSCULAR; INTRAVENOUS; SUBCUTANEOUS at 02:51

## 2017-04-05 RX ADMIN — HYDROMORPHONE HYDROCHLORIDE 0.5 MG: 1 INJECTION, SOLUTION INTRAMUSCULAR; INTRAVENOUS; SUBCUTANEOUS at 18:20

## 2017-04-05 RX ADMIN — HYDRALAZINE HYDROCHLORIDE 50 MG: 50 TABLET ORAL at 08:04

## 2017-04-05 RX ADMIN — PANTOPRAZOLE SODIUM 40 MG: 40 TABLET, DELAYED RELEASE ORAL at 08:04

## 2017-04-05 RX ADMIN — HEPARIN SODIUM 750 UNITS/HR: 10000 INJECTION, SOLUTION INTRAVENOUS at 16:39

## 2017-04-05 ASSESSMENT — PAIN DESCRIPTION - DESCRIPTORS
DESCRIPTORS: ACHING
DESCRIPTORS: CONSTANT
DESCRIPTORS: ACHING

## 2017-04-05 NOTE — PROGRESS NOTES
MAPs were consistently in mid to low 50s.Team notified. Dopamine started at 2.5 and Nitro DCd. Maps now mid 60s. Continue to monitor.     Duc Mart  1:24 PM  4/5/2017

## 2017-04-05 NOTE — PROGRESS NOTES
General acute hospital, [place]  Procedure Note          Intra-Aortic Balloon Pump Insertion:       Luke Henao  MRN# [id number]   April 5, 2017, 4:47 PM Indication: Cardiogenic shock           Procedure performed: April 5, 2017, 3:47 PM   Location: Heart Cath Lab   Catheter size: 34 cc, 7.5 Fr   Inserted: 6 inch introducer sheath   Catheter placed: Right femoral artery   Complications:: None   Assist initiated: 1:1 ratio   Percent augmentation: 100%   Timing adjusted: Adjusted to achieve optimal assist   Verification of position: Fluoroscopy  Confirmed   Comments: None      Recorded by True Montero

## 2017-04-05 NOTE — PLAN OF CARE
Problem: Individualization  Goal: Patient Preferences  Pt was started on nitro. Was doing fine until around 1400. MAPs dipped into the mid 50s. Pt was then started on dopamine. It was decided by the team to proceed with balloon pump. Continue to monitor.    Addendum: IAPB inserted without complications. Hep started at 750 with recheck at 2200. Dopamine turned off at 1830, MAPs stable. Plan is for tentative LVAD.      Duc Mart  4:04 PM  4/5/2017

## 2017-04-05 NOTE — PROGRESS NOTES
"Cardiology Progress Note    Events and interval changes in past 24 hours:   Cr worse. UO has declined depsite being on Bumex 3mg/hr   Nipride turned off this AM due to worsening renal injury  Lactate 1.8-2.8-1.8-3.7  Endorsed chest pain later in morning after bedside rounds      Tm 38.38pm this AM 38.1 HR 90s 110s-120s this AM   MAP 60-70s 80-90/50-60  3L 98%    4am  CVP 14 MAP 75  PA 40/24  PCW  SvO2 50<-44-52-51    BSA 1.6 Hgb 9.6   CI 2.1  CO 3.4  SVR 1200   PVR    Drips  Dobutamine 7.5  Bumex 3  Insulin drip    Meds  Hydralazine 50  QID  Isordil 40 TID  Asa 81  Plavix 75  Vanco, zosyn started 4/3  PPI BID  Heparin subq BID      CXR:  pulm edema, Rogers, enteric tube in stomach  I/O 3.3/4.1since MN 1/0.7    Adm wt 139 wt today 138 yesterday 134    OBJECTIVE FINDINGS:  BP 90/59 (BP Location: Left arm)  Temp 100.5  F (38.1  C) (Axillary)  Resp 18  Ht 1.575 m (5' 2.01\")  Wt 62.9 kg (138 lb 10.7 oz)  SpO2 97%  BMI 25.36 kg/m2    Gen: alert, following commands  CV: tachy, S1 & S2, S3 holosystolic murmur at apex  Lungs: crackles antriorly  Abd: slightly distended, soft  Ext: DP pulses not palpable but dopplerable per RN, no peripheral edema    Lines  Right arm PICC 3/30  Left radial art line 4/2  Rogers 3/27      Wt Readings from Last 5 Encounters:   04/05/17 62.9 kg (138 lb 10.7 oz)   03/27/17 62 kg (136 lb 11 oz)   07/15/08 68.5 kg (151 lb)     CMP  Recent Labs  Lab 04/05/17  0328 04/05/17  0327 04/04/17  2338 04/04/17  2008 04/04/17  1502  04/04/17  0424  04/03/17  2118  04/03/17  1653  04/03/17  1216  04/03/17  0434  04/02/17  1532  04/01/17  1207  04/01/17  0322   *  --   --   --  145*  --  146*  --   --   --  145*  --   --   --  143  --  146*  < >  --   --  146*   POTASSIUM 3.5 3.6 4.0 3.7 4.3  4.4  < > 3.9  4.1  < >  --   < > 3.8  3.7  < > 4.0  < > 4.1  < > 3.6  3.5  < >  --   < > 3.5  3.5   CHLORIDE 106  --   --   --  102  --  102  --   --   --  105  --   --   --  104  --  104  < >  --   --  103 "   CO2 30  --   --   --  33*  --  34*  --   --   --  26  --   --   --  30  --  33*  < >  --   --  32   ANIONGAP 15*  --   --   --  9  --  10  --   --   --  14  --   --   --  10  --  10  < >  --   --  10   *  --   --   --  284*  --  139*  --   --   --  151*  --   --   --  128*  --  162*  < >  --   --  128*   BUN 74*  --   --   --  62*  --  47*  --   --   --  44*  --   --   --  46*  --  50*  < >  --   --  67*   CR 3.77*  --   --   --  2.85*  --  2.31*  --   --   --  1.80*  --   --   --  1.73*  --  1.95*  < >  --   --  3.38*   GFRESTIMATED 17*  --   --   --  24*  --  30*  --   --   --  40*  --   --   --  42*  --  37*  < >  --   --  19*   GFRESTBLACK 21*  --   --   --  29*  --  36*  --   --   --  49*  --   --   --  51*  --  44*  < >  --   --  24*   ROB 7.2*  --   --   --  7.6*  --  7.8*  --   --   --  7.4*  --   --   --  8.1*  --  7.9*  < >  --   --  7.6*   MAG 3.3*  --   --   --  3.0*  --  3.0*  --   --   --  2.7*  --  2.0  --  2.3  --  2.3  < > 2.2  --  2.3   PHOS  --   --   --   --   --   --   --   --   --   --   --   --  3.0  --   --   --  3.2  --  3.1  --  2.9   PROTTOTAL 6.9  --   --   --   --   --  6.7*  --  6.7*  --   --   --   --   --  6.1*  --   --   < >  --   --  5.9*   ALBUMIN 2.7*  --   --   --   --   --  2.6*  --  2.6*  --   --   --   --   --  2.6*  --   --   < >  --   --  2.5*   BILITOTAL 1.3  --   --   --   --   --  1.6*  --  1.2  --   --   --   --   --  1.5*  --   --   < >  --   --  1.0   ALKPHOS 69  --   --   --   --   --  79  --  88  --   --   --   --   --  72  --   --   < >  --   --  74   AST 95*  --   --   --   --   --  128*  --  136*  --   --   --   --   --  146*  --   --   < >  --   --  599*   *  --   --   --   --   --  707*  --  798*  --   --   --   --   --  1001*  --   --   < >  --   --  1990*   < > = values in this interval not displayed.  CBC    Recent Labs  Lab 04/05/17  0328 04/04/17  0424 04/03/17  1758 04/03/17  8144   WBC 14.5* 14.9* 15.1* 14.2*   RBC 3.18* 3.11* 3.39* 3.43*    HGB 9.5* 9.6* 10.3* 10.3*   HCT 30.4* 29.6* 31.9* 32.7*   MCV 96 95 94 95   MCH 29.9 30.9 30.4 30.0   MCHC 31.3* 32.4 32.3 31.5   RDW 16.7* 16.5* 16.2* 15.5*    157 169 129*     INR    Recent Labs  Lab 04/05/17  0328 04/04/17  0424 04/03/17  0434 04/02/17  0405   INR 1.24* 1.21* 1.12 1.18*     Arterial Blood Gas  Recent Labs  Lab 04/05/17  0327 04/04/17  2338 04/04/17 2008 04/04/17  1728  04/03/17  0754  04/03/17  0630   PH  --   --   --   --   --  7.46*  --  7.46*   PCO2  --   --   --   --   --  40  --  43   PO2  --   --   --   --   --  95  --  105   HCO3  --   --   --   --   --  29*  --  30*   O2PER 3L 3L 2L 3L  < > 40.0  < > 40.0   < > = values in this interval not displayed.      Labs and Imaging  Reviewed in epic  Flu swab negative    Echo 3/27  The visual ejection fraction is estimated at 30-35%.  There is extensive inferior, inferoseptal, and inferolateral wall akinesis.  Basal/mid lateral wall hypokinesis  There is moderate to severe septal hypokinesis.  Septal wall motion abnormality may reflect pacemaker activation.  There is a catheter/pacemaker lead seen in the right ventricle.  The right ventricle is mildly dilated.  Severely decreased right ventricular systolic function  There is no pericardial effusion.  The rhythm was paced.  Compared to the prior study, the LVEF appears somewhat decreased. Septal wall  motion abnormality larger- may reflect, in part, that patient in sinus tach on  prior study, and ventricular paced now. No hemodynamically significant  valvular abnormalities on 2D or color flow imaging    CT chest/abdomen 3/27   IMPRESSION:   1. Small mediastinal hemorrhage, small bilateral pleural effusions  which may be hemorrhagic. Small intraperitoneal hemorrhage. Minimal  pericardial hemorrhage.  2. Minimal pneumoperitoneum in the left paracolic gutter, of unknown  significance. No pneumatosis as clinically questioned.  3. IABP slightly low in position.  4. Bilateral pulmonary  dependent consolidations represent compression  atelectasis, however differentials include aspiration.    Cath 3/26 [facility]  SUMMARY:   --Inferior STEMI w/ late presentation. Culprit dictated by complete  heart block, and cardiogenic shock. Emergent echo in the Cath Lab also  shows significant RV dysfunction.  --Three vessel coronary artery disease with diffuse coronary disease  out to the distal vessels  --Successful POBA of the prox and mid-RCA. Stent was not placed, in  anticipation he may need to be considered for bypass surgery in the  near future  --Insertion of a temporary pacemaker for bradycardia (AVB) and  hypotension  --Insertion of a IABP  --Upper GI bleed consistent with Kaylin-Bonilla    Cath [place] 3/27    CT head 3/28: normal     Echo 3/29  Interpretation Summary  Limited study. Ischemic CM. Moderately (EF 30-35%) reduced left ventricular  function is present. Traced at 32%.  Posterior wall akinesis is present. Lateral wall akinesis is present. Inferior  wall akinesis is present.  Right ventricular function, chamber size, wall motion, and thickness are  normal.  Dilation of the inferior vena cava is present with abnormal respiratory  variation in diameter.     Compared to prior study, RV fxn is improved.    Echo 3/31  Left Ventricle  The Ejection Fraction is estimated at 50-55%. Inferior,posterior,lateral,basal  septal wall akinesis as reported before. No change.    CORONARY ANGIOGRAM 4/1:   1. Both coronary arteries arise from their respective cusps.  2. Right dominant.  3. LM has mild luminal irregularities.   4. LAD supplies the apex along with the RCA (type 2 LAD) and gives rise to septal perforators and a large and branching D1. There are widely patent stents extending from the proximal to mid LAD. The dLAD has mild to moderate disease to 30% stenosis. The D1 is jailed by the LAD stents, but has EBER 3 flow with disease to 30% stenosis.    5. The small ramus is no longer  present.  6. LCX is occluded at the ostium.   7. RCA gives rise to PL branches and supplies PDA. There are widely patent stents extending from the proximal to mid RCA. The remainder of the mRCA has disease to 50% stenosis and the dRCA has disease to 10% stenosis. The RPDA has a widely patent stent and the remainder of the artery has mild disease to 10% stenosis. The RPLA has small vessels with diffuse disease including a distal branch occlusion. The RPLA supplies some small collateral branches to the dLCx.     COMPLICATIONS:  1. None     SUMMARY:   1. Cardiogenic shock following an inferior STEMI.  2. The LCx re-occlusion is not surprising as the recently revascularized LCx  had poor outflow and the revascularized portion did not supply a large territory (limited to no flow was present distal to the stented segment immediately following stenting). Repeat revascularization of the LCx would result in the same outcome of repeat closure and also risk embolizing thrombus from the LCx into the LAD so no treatment of the occluded LCx was performed.   3. Three vessel coronary artery disease with patent LAD and RCA stents and an occluded LCx.    Echo 4/3  Left ventricular size is normal.  The Ejection Fraction is estimated at 35-40%.  Inferior wall akinesis is present.  Lateral wall akinesis is present.  Posterior wall akinesis is present.  Right ventricular function, chamber size, wall motion, and thickness are  normal.  Moderate mitral insufficiency is present.  Moderate tricuspid insufficiency is present.  Right ventricular systolic pressure is 47mmHg above the right atrial pressure.  The inferior vena cava is normal.    CT abd without contrast 4/4  IMPRESSION:   1. No evidence of ischemic bowel, obstruction, or intra-abdominal  infection.  2. Bibasilar patchy pulmonary opacities likely represent combination  of aspiration and atelectasis.  3. Small amount of simple free fluid within the lower abdomen.  4. Small left  retroperitoneal hematoma along the iliac vasculature  likely postprocedural.   5. Unchanged size of bilateral pleural effusions, which now consist of  simple fluid.    Plan for today  1) Nipride turned off due to worsening renal injury. Nitroglycerin started for afterload reduction. Hydralazine and Isordil not uptitrated because he was in shock yesterday and it's not clear if he will tolerate going up on those meds    2) For chest pain- ECG shows ST depressions in V5-V6, trop pending    3) worsening renal injury- from hypoperfusion from cardiogenic shock yesterday plus central venous congestion: continue bumex 3mg/hr, optimize afterload with nitroglycerin    4) q2 hrs hemodynamics. If CI gets worse will put in IABP    Card  # Cardiogenic shock 4/3- from underlying 3v CAD-has had high SVR and low CI   # Mixed shock initially: Cardiogenic from biventricular failure from inferior STEMI and distributive shock from post-MI SIRS response vs aspiration pneumonia/pneumonitis from possible aspiration during cath at [facility] on 3/26  # Due to inferior wall STEMI. S/P 7 stents to LAD, circ, RCA (angiogram report pending). Now with IABP, temporary pacemaker after 3rd deg heart block in cath lab at [facility] on 3/26   # Small pericardial hemorrhage  - IABP removed 4/2  -temp pacer removed 3/29  - Dual antiplatelet therapy with ASA 81mg and plavix 75mg qday  -Hydralazine, Isordil  -eventually will start statin     # Neuro  -alert, follows commands        Respiratory  #Intubated for airway protection due to mental status and emesis on 3/26, extubated 4/3  #Probable Aspiration PNA/pneumonitis vs MSSA pneumonia  #Small mediastinal hemorrhage       # ID  -fever on 4/3-type 2 MI vs infection (unclear source)  . Sepsis from aspiration pneumonia/pneumonitis vs MSSA pneumonia vs post-MI SIRS response initially on 3/28  - repeat cultures if febrile  - was on Unasyn. Switched to vanco and zosyn on 4/3 for fever and elevated  lactate concern for sepsis       # Endocrine  T2DM: HA1C 7.5 yesterday on admission. BG climibing to 170s since admission. Will likely continue to rise with steroids.   - Insulin gtt per nurising protocol with hypoglycemia precautions.      # GI  -possible ileus vs constipation-passing gas  -bowel regimen    Minimal pneumoperitoneum, unclear etiology: Seen by General surgery.  -had bloody NG output earlier in hospitalization. Was on PPI BID switched to once daily 4/3   -Conservative Mx for now    Shock liver injury with coagulopathy-improving trend LFTs,   -Vit K 2.5mg 3/28, Vit K 3/29       # Renal/  Acute kidney injury- improving, nonoliguric: multifactorial: hypoperfusion, contrast etc.  -Diuresis  - Daily Cr  - Avoid nephrotoxins as able  - Chavis placed 3/26    -Hypernatremia- resolved    #Hem  -thrombocytopenia-likely from balloon pump  -Anemia- multifactorial     FEN: feeding tube  Code: FULL  PPx:  PPI 40mg daily, senna PRN  Dispo: pending stability    Will staff with Dr. Talon Park  General Cardiology fellow, PGY4  Pager [phone]      I have reviewed today's vital signs, notes, medications, labs and imaging.  I have also seen and examined the patient and agree with the findings and plan as outlined above.  Pt with marginal hemodynamics elected to place IABP with significant improvement.  Tmax 100.5, , CVP 14 and CI 2.4 with 1:1 IABP.  Lungs with scattered rhonchi.  Tachy S1 and S2 with +S4.  Abd with mild tenderness. Labs with Cr 3.77 and WBC 14.5.  Assessment: Pt with IWMI with improved hemodynamics following IABP and Dbx.  Follow abd exam and fever curve.  Pt seen X4 today for total critical care time 45 min.     Vikram Jean-Baptiste MD, PhD  Professor, Heart Failure and Cardiac Transplantation  [facility]

## 2017-04-05 NOTE — PROCEDURES
PRELIMINARY CARDIAC CATH REPORT:     PROCEDURES PERFORMED:   1. Intra-aortic balloon pump placement.       PHYSICIANS:  1. Attending Interventional Cardiology Staff: Ron Cortez MD  2. Interventional Cardiology Fellow: Karl Dorsey DO       INDICATION:  Luke Henao is a 51 year old male with cardiogenic shock, referred for IABP placement.     DESCRIPTION:  1. Consent obtained with discussion of risks.    2. Sterile prep and procedure.  3. Location: right common femoral artery.  4. Access: Local anesthetic with lidocaine.  A standard (18 g) needle with ultrasound guidance was used to establish vascular access using a modified Seldinger technique.  5. Sheath: 8Fr standard sheath.  6. Catheters: 7.5 Fr, 34mL IABP.   7. Fluoroscopy time of 0.7 min.  8. Estimated blood loss of <10 mL.  9. See below for procedure details.    MEDICATIONS:  1. Lidocaine SQ.  2. Continuation of ICU drips.       INTRA-AORTIC BALLOON PUMP INSERTION:  1. A 7.5Fr 34 mL IABP was inserted into the right femoral artery under fluoroscopic guidance.      COMPLICATIONS:  1. None    SUMMARY:   1. Successful placement of a 7.5Fr 34 mL IABP was inserted into the right femoral artery.    PLAN:   1. Low dose heparin per protocol. IABP per protocol.   2. Return to the primary inpatient team for further evaluation and management.      The attending interventional cardiologist was present for the entire procedure.    See CVIS report for final draft.    Karl Dorsey DO, [facility]  Interventional Cardiology Fellow  [phone]

## 2017-04-05 NOTE — PLAN OF CARE
Problem: Goal Outcome Summary  Goal: Goal Outcome Summary  OT 4E: Cancel - Per MD and RN, pt not appropriate for therapies today. Potential for replacement of IABP. Will cancel and reschedule as appropriate per POC.

## 2017-04-05 NOTE — PLAN OF CARE
Problem: Goal Outcome Summary  Goal: Goal Outcome Summary  PT 4E: Cancel - Per MD and RN, pt not appropriate for therapies today. Potential for replacement of IABP. Will cancel and reschedule as appropriate per POC.

## 2017-04-05 NOTE — PLAN OF CARE
Problem: Goal Outcome Summary  Goal: Goal Outcome Summary  ST 4E: Cx- Pt currently NPO for procedure. Not appropriate for ST evaluation this date per discussion with RN. Will reschedule.

## 2017-04-05 NOTE — PLAN OF CARE
Problem: Cardiac: Acute Coronary Syndrome (ACS) (Adult)  Goal: Signs and Symptoms of Listed Potential Problems Will be Absent or Manageable (Cardiac: Acute Coronary Syndrome)  Signs and symptoms of listed potential problems will be absent or manageable by discharge/transition of care (reference Cardiac: Acute Coronary Syndrome (ACS) (Adult) CPG).   Outcome: No Change  Patient s/p IABP removal and extubation on 4/3. Closely monitoring hemodynamics with possibility of replacing IABP.  MAPs 60-70, titrating nipride gtt. CVP 14-17, PAP 40s/20s, GENE 1.8-2.1, SVR 0836-7108.   Remains on bumex gtt. Diuril and 5mg bumex bolus given overnight with minimal results. -150mL/hr.   Patient reports frequent lower back pain. PRN oxycodone and dilaudid initiated.   Continue to monitor hemodynamics and manage pain.   Discuss POC with Cards 2.

## 2017-04-05 NOTE — PROGRESS NOTES
Pt experiencing increased CP after oxy and dilaudid. 12 lead showed ST with infarct and ST changes. Cards II team was notified. Continue to monitor.    Duc Mart  11:19 AM  4/5/2017

## 2017-04-05 NOTE — PROGRESS NOTES
"[facility]   Antimicrobial Management Team (AMT) Note            To: Cards II  Unit: 4E  Allergies:  no known allergies  Brief Summary: Luke Henao is a 51 year old male smoker male with diabetes who presented to the on 03/26 with a STEMI. Patient s primary language is [place]; patient speaks some English. Family informed EMS that the patient began having vomiting and \"flu-like\" symptoms two days ago along with back pain between his shoulder blades. He had a blood sugar of 201, blood pressure of 88/57, and was bradycardic for paramedics; patient was in cardiac shock, was intubated, and transported to MICU.  Interval History:  03/26 STEMI confirmed; cardiac shock, intubation  03/29 Feeding tube placed for nutrition support, as pt still intubated  04/05 patient extubated and on 2 LPM oxygen via nasal cannula    Assessment:  Aspiration Pneumonia; possible sepsis vs post-MI SIRS response:    WBC high upon admit; concern for possible aspiration during a cath at [facility] on 03/26. Lactate elevated on 03/26 (5.3), spiked  on 03/27 (17), and have since trended down to 1.8 this morning (03/30); could be secondary to STEMI and associated cardiac shock/tissue hypoperfusion vs infection. Sputum culture drawn on 03/28 was positive for heavy growth of staph aureus and haemophilus influenza; narrowed antibiotic therapy to Unasyn on 03/30.    Clinical changes on 04/03: Patient spiked a temperature of 102.4; WBC increased to 15.1 Lactic acid elevated to 4.2. Chest x-ray showed worsening interstitial perihilar opacities, may represent worsening pulmonary edema or infection. Question as to whether or not this is progression of previously cultures staph or a new infection. Unasyn discontinued and vancomycin and Zosyn reinitiated.     Today (04/05; D=9 of antibiotics), WBC still elevated at 14.5 (has been 14-15.5 for past 10 days). Temperature trending down to 100.5; oxygen needs " decreasing as patient was extubated and on 2LPM oxygen via nasal cannula. As stated above, patient s risk for MRSA very low; previous cultures grew MSSA. No new growth to date. Cardiology note on  indicated patient experienced cardiac shock on . Increased LA could be from cardiogenic shock vs MSSA pneumonia vs aspiration pneumonitis. Note, patient s sodium trending up to 151 today; Zosyn increases serum sodium levels.     Recommendation/Interventions:   1). Discontinue vancomycin.  2). Agree with management of Zosyn. Follow clinical course to determine duration of therapy.      Discussed w/ ID Staff-Dr. Chaudhari.    Current Antibiotic therapy:  Zosyn and Vanco reinitiated on     Previous Antibiotic therapy:  Zosyn 4.5 g Q6H; start  at 2251, 6 doses, then dose reduced   Zosyn 3.375 g Q6H; start 3/29 at 1000 (dose adjustment)  Vanco 1500 mg load, followed by 1250 mg Q18H  : Narrowed to Unasyn 3 g Q8H; discontinued     Vital Signs and other clinical features:    Temperature    Imagin/27 Chest X-ray: 3. Increased left retrocardiac airspace opacities, likely atelectatic.     CXR: 2. Stable perihilar and left retrocardiac opacities.     CXR: Impression: Worsening interstitial perihilar opacities, may represent worsening pulmonary edema or infection.  Worsening patchy left retrocardiac opacity of consolidation and/or subsegmental atelectasis.     Echo: not indicative of infection or vegetation    Culture Results:  Date Culture Site Organism    Nares MRSA negative     Blood left arm  No growth    Blood left hand No growth    Blood right arm No growth     0057 Sputum, endotracheal Heavy growth staph aureus, heavy growth haemophilus influenza, light growth normal brianda; gram stain contained mixed gram + and gram - organisms. Susceptible to cprio, clinda, erythro, gent, levo, ox    1951 Blood right arm      Sputum endotracheal  Gram stain:  gram positive cocci. Moderate growth Staph aureus   04/03 1800 Blood x 3, pre Abx, PICC, right hand, left hand No growth to date   04/05 Blood left hand No growth

## 2017-04-06 ENCOUNTER — APPOINTMENT (OUTPATIENT)
Dept: OCCUPATIONAL THERAPY | Facility: CLINIC | Age: 52
DRG: 228 | End: 2017-04-06
Attending: INTERNAL MEDICINE
Payer: COMMERCIAL

## 2017-04-06 ENCOUNTER — APPOINTMENT (OUTPATIENT)
Dept: SPEECH THERAPY | Facility: CLINIC | Age: 52
DRG: 228 | End: 2017-04-06
Attending: INTERNAL MEDICINE
Payer: COMMERCIAL

## 2017-04-06 ENCOUNTER — APPOINTMENT (OUTPATIENT)
Dept: ULTRASOUND IMAGING | Facility: CLINIC | Age: 52
DRG: 228 | End: 2017-04-06
Attending: INTERNAL MEDICINE
Payer: COMMERCIAL

## 2017-04-06 LAB
ALBUMIN SERPL-MCNC: 2.5 G/DL (ref 3.4–5)
ALP SERPL-CCNC: 77 U/L (ref 40–150)
ALT SERPL W P-5'-P-CCNC: 396 U/L (ref 0–70)
ANION GAP SERPL CALCULATED.3IONS-SCNC: 13 MMOL/L (ref 3–14)
ANION GAP SERPL CALCULATED.3IONS-SCNC: 15 MMOL/L (ref 3–14)
APTT PPP: 74 SEC (ref 22–37)
AST SERPL W P-5'-P-CCNC: 67 U/L (ref 0–45)
BASE EXCESS BLDA CALC-SCNC: 7.7 MMOL/L
BASE EXCESS BLDV CALC-SCNC: 10 MMOL/L
BASE EXCESS BLDV CALC-SCNC: 11.8 MMOL/L
BASE EXCESS BLDV CALC-SCNC: 8.1 MMOL/L
BASE EXCESS BLDV CALC-SCNC: 8.2 MMOL/L
BASE EXCESS BLDV CALC-SCNC: 8.2 MMOL/L
BASE EXCESS BLDV CALC-SCNC: 8.9 MMOL/L
BASE EXCESS BLDV CALC-SCNC: 9.9 MMOL/L
BASE EXCESS BLDV CALC-SCNC: 9.9 MMOL/L
BILIRUB DIRECT SERPL-MCNC: 0.5 MG/DL (ref 0–0.2)
BILIRUB SERPL-MCNC: 1.3 MG/DL (ref 0.2–1.3)
BUN SERPL-MCNC: 105 MG/DL (ref 7–30)
BUN SERPL-MCNC: 112 MG/DL (ref 7–30)
CA-I BLD-MCNC: 3.8 MG/DL (ref 4.4–5.2)
CALCIUM SERPL-MCNC: 7.1 MG/DL (ref 8.5–10.1)
CALCIUM SERPL-MCNC: 7.2 MG/DL (ref 8.5–10.1)
CHLORIDE SERPL-SCNC: 105 MMOL/L (ref 94–109)
CHLORIDE SERPL-SCNC: 109 MMOL/L (ref 94–109)
CO2 SERPL-SCNC: 30 MMOL/L (ref 20–32)
CO2 SERPL-SCNC: 33 MMOL/L (ref 20–32)
CREAT SERPL-MCNC: 5.05 MG/DL (ref 0.66–1.25)
CREAT SERPL-MCNC: 5.1 MG/DL (ref 0.66–1.25)
ERYTHROCYTE [DISTWIDTH] IN BLOOD BY AUTOMATED COUNT: 16.8 % (ref 10–15)
GFR SERPL CREATININE-BSD FRML MDRD: 12 ML/MIN/1.7M2
GFR SERPL CREATININE-BSD FRML MDRD: 12 ML/MIN/1.7M2
GLUCOSE BLDC GLUCOMTR-MCNC: 106 MG/DL (ref 70–99)
GLUCOSE BLDC GLUCOMTR-MCNC: 113 MG/DL (ref 70–99)
GLUCOSE BLDC GLUCOMTR-MCNC: 114 MG/DL (ref 70–99)
GLUCOSE BLDC GLUCOMTR-MCNC: 115 MG/DL (ref 70–99)
GLUCOSE BLDC GLUCOMTR-MCNC: 125 MG/DL (ref 70–99)
GLUCOSE BLDC GLUCOMTR-MCNC: 127 MG/DL (ref 70–99)
GLUCOSE BLDC GLUCOMTR-MCNC: 129 MG/DL (ref 70–99)
GLUCOSE BLDC GLUCOMTR-MCNC: 129 MG/DL (ref 70–99)
GLUCOSE BLDC GLUCOMTR-MCNC: 130 MG/DL (ref 70–99)
GLUCOSE BLDC GLUCOMTR-MCNC: 136 MG/DL (ref 70–99)
GLUCOSE BLDC GLUCOMTR-MCNC: 147 MG/DL (ref 70–99)
GLUCOSE BLDC GLUCOMTR-MCNC: 153 MG/DL (ref 70–99)
GLUCOSE BLDC GLUCOMTR-MCNC: 155 MG/DL (ref 70–99)
GLUCOSE BLDC GLUCOMTR-MCNC: 158 MG/DL (ref 70–99)
GLUCOSE BLDC GLUCOMTR-MCNC: 161 MG/DL (ref 70–99)
GLUCOSE BLDC GLUCOMTR-MCNC: 175 MG/DL (ref 70–99)
GLUCOSE BLDC GLUCOMTR-MCNC: 200 MG/DL (ref 70–99)
GLUCOSE BLDC GLUCOMTR-MCNC: 91 MG/DL (ref 70–99)
GLUCOSE SERPL-MCNC: 128 MG/DL (ref 70–99)
GLUCOSE SERPL-MCNC: 175 MG/DL (ref 70–99)
HCO3 BLD-SCNC: 32 MMOL/L (ref 21–28)
HCO3 BLDV-SCNC: 34 MMOL/L (ref 21–28)
HCO3 BLDV-SCNC: 35 MMOL/L (ref 21–28)
HCO3 BLDV-SCNC: 36 MMOL/L (ref 21–28)
HCO3 BLDV-SCNC: 38 MMOL/L (ref 21–28)
HCT VFR BLD AUTO: 29 % (ref 40–53)
HGB BLD-MCNC: 8.9 G/DL (ref 13.3–17.7)
INR PPP: 1.28 (ref 0.86–1.14)
INTERPRETATION ECG - MUSE: NORMAL
INTERPRETATION ECG - MUSE: NORMAL
LACTATE BLD-SCNC: 1.1 MMOL/L (ref 0.7–2.1)
LACTATE BLD-SCNC: 1.2 MMOL/L (ref 0.7–2.1)
LACTATE BLD-SCNC: 1.3 MMOL/L (ref 0.7–2.1)
LACTATE BLD-SCNC: 1.5 MMOL/L (ref 0.7–2.1)
LACTATE BLD-SCNC: 1.8 MMOL/L (ref 0.7–2.1)
LACTATE BLD-SCNC: 2.5 MMOL/L (ref 0.7–2.1)
LMWH PPP CHRO-ACNC: 0.16 IU/ML
LMWH PPP CHRO-ACNC: 0.37 IU/ML
MAGNESIUM SERPL-MCNC: 3.1 MG/DL (ref 1.6–2.3)
MAGNESIUM SERPL-MCNC: 3.2 MG/DL (ref 1.6–2.3)
MAGNESIUM SERPL-MCNC: 3.4 MG/DL (ref 1.6–2.3)
MCH RBC QN AUTO: 30.2 PG (ref 26.5–33)
MCHC RBC AUTO-ENTMCNC: 30.7 G/DL (ref 31.5–36.5)
MCV RBC AUTO: 98 FL (ref 78–100)
O2/TOTAL GAS SETTING VFR VENT: ABNORMAL %
OXYHGB MFR BLDV: 47 %
OXYHGB MFR BLDV: 48 %
OXYHGB MFR BLDV: 49 %
OXYHGB MFR BLDV: 50 %
OXYHGB MFR BLDV: 52 %
OXYHGB MFR BLDV: 52 %
OXYHGB MFR BLDV: 54 %
OXYHGB MFR BLDV: 58 %
PCO2 BLD: 45 MM HG (ref 35–45)
PCO2 BLDV: 55 MM HG (ref 40–50)
PCO2 BLDV: 57 MM HG (ref 40–50)
PCO2 BLDV: 57 MM HG (ref 40–50)
PCO2 BLDV: 58 MM HG (ref 40–50)
PCO2 BLDV: 60 MM HG (ref 40–50)
PH BLD: 7.46 PH (ref 7.35–7.45)
PH BLDV: 7.39 PH (ref 7.32–7.43)
PH BLDV: 7.4 PH (ref 7.32–7.43)
PH BLDV: 7.41 PH (ref 7.32–7.43)
PH BLDV: 7.41 PH (ref 7.32–7.43)
PH BLDV: 7.42 PH (ref 7.32–7.43)
PHOSPHATE SERPL-MCNC: 5.2 MG/DL (ref 2.5–4.5)
PHOSPHATE SERPL-MCNC: 7.4 MG/DL (ref 2.5–4.5)
PLATELET # BLD AUTO: 200 10E9/L (ref 150–450)
PO2 BLD: 97 MM HG (ref 80–105)
PO2 BLDV: 30 MM HG (ref 25–47)
PO2 BLDV: 31 MM HG (ref 25–47)
PO2 BLDV: 32 MM HG (ref 25–47)
PO2 BLDV: 32 MM HG (ref 25–47)
PO2 BLDV: 33 MM HG (ref 25–47)
PO2 BLDV: 34 MM HG (ref 25–47)
PO2 BLDV: 35 MM HG (ref 25–47)
PO2 BLDV: 36 MM HG (ref 25–47)
POTASSIUM BLD-SCNC: 3.1 MMOL/L (ref 3.4–5.3)
POTASSIUM BLD-SCNC: 3.5 MMOL/L (ref 3.4–5.3)
POTASSIUM BLD-SCNC: 3.5 MMOL/L (ref 3.4–5.3)
POTASSIUM BLD-SCNC: 3.9 MMOL/L (ref 3.4–5.3)
POTASSIUM BLD-SCNC: 4 MMOL/L (ref 3.4–5.3)
POTASSIUM BLD-SCNC: 4.4 MMOL/L (ref 3.4–5.3)
POTASSIUM SERPL-SCNC: 3.4 MMOL/L (ref 3.4–5.3)
POTASSIUM SERPL-SCNC: 4.3 MMOL/L (ref 3.4–5.3)
PROT SERPL-MCNC: 6.6 G/DL (ref 6.8–8.8)
RBC # BLD AUTO: 2.95 10E12/L (ref 4.4–5.9)
SODIUM SERPL-SCNC: 150 MMOL/L (ref 133–144)
SODIUM SERPL-SCNC: 155 MMOL/L (ref 133–144)
WBC # BLD AUTO: 12.7 10E9/L (ref 4–11)

## 2017-04-06 PROCEDURE — 20000004 ZZH R&B ICU UMMC

## 2017-04-06 PROCEDURE — 83735 ASSAY OF MAGNESIUM: CPT | Performed by: INTERNAL MEDICINE

## 2017-04-06 PROCEDURE — 84132 ASSAY OF SERUM POTASSIUM: CPT | Performed by: INTERNAL MEDICINE

## 2017-04-06 PROCEDURE — 85610 PROTHROMBIN TIME: CPT | Performed by: INTERNAL MEDICINE

## 2017-04-06 PROCEDURE — S0171 BUMETANIDE 0.5 MG: HCPCS | Performed by: INTERNAL MEDICINE

## 2017-04-06 PROCEDURE — 25000132 ZZH RX MED GY IP 250 OP 250 PS 637: Performed by: STUDENT IN AN ORGANIZED HEALTH CARE EDUCATION/TRAINING PROGRAM

## 2017-04-06 PROCEDURE — 25000125 ZZHC RX 250: Performed by: INTERNAL MEDICINE

## 2017-04-06 PROCEDURE — 80048 BASIC METABOLIC PNL TOTAL CA: CPT | Performed by: INTERNAL MEDICINE

## 2017-04-06 PROCEDURE — 25000132 ZZH RX MED GY IP 250 OP 250 PS 637: Performed by: INTERNAL MEDICINE

## 2017-04-06 PROCEDURE — 40000076 ZZH STATISTIC IABP MONITORING

## 2017-04-06 PROCEDURE — 25000128 H RX IP 250 OP 636: Performed by: INTERNAL MEDICINE

## 2017-04-06 PROCEDURE — 76705 ECHO EXAM OF ABDOMEN: CPT

## 2017-04-06 PROCEDURE — 82805 BLOOD GASES W/O2 SATURATION: CPT | Performed by: INTERNAL MEDICINE

## 2017-04-06 PROCEDURE — 80053 COMPREHEN METABOLIC PANEL: CPT | Performed by: INTERNAL MEDICINE

## 2017-04-06 PROCEDURE — 40000048 ZZH STATISTIC DAILY SWAN MONITORING

## 2017-04-06 PROCEDURE — 93010 ELECTROCARDIOGRAM REPORT: CPT | Performed by: INTERNAL MEDICINE

## 2017-04-06 PROCEDURE — 92610 EVALUATE SWALLOWING FUNCTION: CPT | Mod: GN | Performed by: SPEECH-LANGUAGE PATHOLOGIST

## 2017-04-06 PROCEDURE — 82803 BLOOD GASES ANY COMBINATION: CPT | Performed by: INTERNAL MEDICINE

## 2017-04-06 PROCEDURE — 40000133 ZZH STATISTIC OT WARD VISIT: Performed by: OCCUPATIONAL THERAPIST

## 2017-04-06 PROCEDURE — 82330 ASSAY OF CALCIUM: CPT | Performed by: INTERNAL MEDICINE

## 2017-04-06 PROCEDURE — 99291 CRITICAL CARE FIRST HOUR: CPT | Mod: GC | Performed by: INTERNAL MEDICINE

## 2017-04-06 PROCEDURE — T1013 SIGN LANG/ORAL INTERPRETER: HCPCS | Mod: U3

## 2017-04-06 PROCEDURE — 40000196 ZZH STATISTIC RAPCV CVP MONITORING

## 2017-04-06 PROCEDURE — 40000225 ZZH STATISTIC SLP WARD VISIT: Performed by: SPEECH-LANGUAGE PATHOLOGIST

## 2017-04-06 PROCEDURE — 00000146 ZZHCL STATISTIC GLUCOSE BY METER IP

## 2017-04-06 PROCEDURE — 85027 COMPLETE CBC AUTOMATED: CPT | Performed by: INTERNAL MEDICINE

## 2017-04-06 PROCEDURE — 84100 ASSAY OF PHOSPHORUS: CPT | Performed by: INTERNAL MEDICINE

## 2017-04-06 PROCEDURE — 83605 ASSAY OF LACTIC ACID: CPT | Performed by: INTERNAL MEDICINE

## 2017-04-06 PROCEDURE — 97110 THERAPEUTIC EXERCISES: CPT | Mod: GO | Performed by: OCCUPATIONAL THERAPIST

## 2017-04-06 PROCEDURE — 25000132 ZZH RX MED GY IP 250 OP 250 PS 637

## 2017-04-06 PROCEDURE — 85520 HEPARIN ASSAY: CPT | Performed by: INTERNAL MEDICINE

## 2017-04-06 PROCEDURE — 82248 BILIRUBIN DIRECT: CPT | Performed by: INTERNAL MEDICINE

## 2017-04-06 PROCEDURE — 40000275 ZZH STATISTIC RCP TIME EA 10 MIN

## 2017-04-06 PROCEDURE — 85730 THROMBOPLASTIN TIME PARTIAL: CPT | Performed by: INTERNAL MEDICINE

## 2017-04-06 PROCEDURE — 25000128 H RX IP 250 OP 636: Performed by: STUDENT IN AN ORGANIZED HEALTH CARE EDUCATION/TRAINING PROGRAM

## 2017-04-06 PROCEDURE — 93005 ELECTROCARDIOGRAM TRACING: CPT

## 2017-04-06 PROCEDURE — 27210432 ZZH NUTRITION PRODUCT RENAL BASIC LITER

## 2017-04-06 RX ORDER — BUMETANIDE 0.25 MG/ML
5 INJECTION INTRAMUSCULAR; INTRAVENOUS ONCE
Status: COMPLETED | OUTPATIENT
Start: 2017-04-06 | End: 2017-04-06

## 2017-04-06 RX ORDER — ACETAMINOPHEN 325 MG/1
325 TABLET ORAL EVERY 4 HOURS PRN
Status: DISCONTINUED | OUTPATIENT
Start: 2017-04-06 | End: 2017-04-19

## 2017-04-06 RX ADMIN — POTASSIUM CHLORIDE 20 MEQ: 1.5 POWDER, FOR SOLUTION ORAL at 04:30

## 2017-04-06 RX ADMIN — Medication 3 MG/HR: at 00:47

## 2017-04-06 RX ADMIN — Medication 3 MG/HR: at 17:38

## 2017-04-06 RX ADMIN — CALCIUM GLUCONATE 2 G: 94 INJECTION, SOLUTION INTRAVENOUS at 06:30

## 2017-04-06 RX ADMIN — ACETAMINOPHEN 325 MG: 325 TABLET, FILM COATED ORAL at 17:37

## 2017-04-06 RX ADMIN — PIPERACILLIN AND TAZOBACTAM 2.25 G: 2; .25 INJECTION, POWDER, LYOPHILIZED, FOR SOLUTION INTRAVENOUS; PARENTERAL at 17:37

## 2017-04-06 RX ADMIN — HUMAN INSULIN 4 UNITS/HR: 100 INJECTION, SOLUTION SUBCUTANEOUS at 01:26

## 2017-04-06 RX ADMIN — DOBUTAMINE 7.5 MCG/KG/MIN: 12.5 INJECTION, SOLUTION, CONCENTRATE INTRAVENOUS at 04:22

## 2017-04-06 RX ADMIN — PIPERACILLIN AND TAZOBACTAM 2.25 G: 2; .25 INJECTION, POWDER, LYOPHILIZED, FOR SOLUTION INTRAVENOUS; PARENTERAL at 11:58

## 2017-04-06 RX ADMIN — OXYCODONE HYDROCHLORIDE 10 MG: 5 TABLET ORAL at 04:09

## 2017-04-06 RX ADMIN — HUMAN INSULIN 2 UNITS/HR: 100 INJECTION, SOLUTION SUBCUTANEOUS at 23:58

## 2017-04-06 RX ADMIN — HYDROMORPHONE HYDROCHLORIDE 0.5 MG: 1 INJECTION, SOLUTION INTRAMUSCULAR; INTRAVENOUS; SUBCUTANEOUS at 01:26

## 2017-04-06 RX ADMIN — BUMETANIDE 5 MG: 0.25 INJECTION, SOLUTION INTRAMUSCULAR; INTRAVENOUS at 21:25

## 2017-04-06 RX ADMIN — OXYCODONE HYDROCHLORIDE 10 MG: 5 TABLET ORAL at 21:27

## 2017-04-06 RX ADMIN — POTASSIUM CHLORIDE 40 MEQ: 1.5 POWDER, FOR SOLUTION ORAL at 14:09

## 2017-04-06 RX ADMIN — PIPERACILLIN AND TAZOBACTAM 2.25 G: 2; .25 INJECTION, POWDER, LYOPHILIZED, FOR SOLUTION INTRAVENOUS; PARENTERAL at 05:54

## 2017-04-06 RX ADMIN — POTASSIUM CHLORIDE 40 MEQ: 1.5 POWDER, FOR SOLUTION ORAL at 22:15

## 2017-04-06 RX ADMIN — PANTOPRAZOLE SODIUM 40 MG: 40 TABLET, DELAYED RELEASE ORAL at 08:04

## 2017-04-06 RX ADMIN — OXYCODONE HYDROCHLORIDE 10 MG: 5 TABLET ORAL at 00:12

## 2017-04-06 RX ADMIN — DOBUTAMINE 5 MCG/KG/MIN: 12.5 INJECTION, SOLUTION, CONCENTRATE INTRAVENOUS at 22:36

## 2017-04-06 RX ADMIN — PIPERACILLIN AND TAZOBACTAM 2.25 G: 2; .25 INJECTION, POWDER, LYOPHILIZED, FOR SOLUTION INTRAVENOUS; PARENTERAL at 23:58

## 2017-04-06 RX ADMIN — POTASSIUM CHLORIDE 40 MEQ: 1.5 POWDER, FOR SOLUTION ORAL at 09:21

## 2017-04-06 RX ADMIN — HYDROMORPHONE HYDROCHLORIDE 0.5 MG: 1 INJECTION, SOLUTION INTRAMUSCULAR; INTRAVENOUS; SUBCUTANEOUS at 14:45

## 2017-04-06 RX ADMIN — POTASSIUM CHLORIDE 20 MEQ: 29.8 INJECTION, SOLUTION INTRAVENOUS at 22:15

## 2017-04-06 RX ADMIN — MULTIVIT AND MINERALS-FERROUS GLUCONATE 9 MG IRON/15 ML ORAL LIQUID 15 ML: at 08:04

## 2017-04-06 RX ADMIN — CLOPIDOGREL 75 MG: 75 TABLET, FILM COATED ORAL at 08:04

## 2017-04-06 RX ADMIN — OXYCODONE HYDROCHLORIDE 10 MG: 5 TABLET ORAL at 14:09

## 2017-04-06 RX ADMIN — HYDROMORPHONE HYDROCHLORIDE 0.5 MG: 1 INJECTION, SOLUTION INTRAMUSCULAR; INTRAVENOUS; SUBCUTANEOUS at 06:32

## 2017-04-06 RX ADMIN — ASPIRIN 81 MG CHEWABLE TABLET 81 MG: 81 TABLET CHEWABLE at 08:04

## 2017-04-06 RX ADMIN — OXYCODONE HYDROCHLORIDE 10 MG: 5 TABLET ORAL at 08:05

## 2017-04-06 RX ADMIN — HUMAN INSULIN 4 UNITS/HR: 100 INJECTION, SOLUTION SUBCUTANEOUS at 13:26

## 2017-04-06 ASSESSMENT — PAIN DESCRIPTION - DESCRIPTORS
DESCRIPTORS: ACHING
DESCRIPTORS: ACHING

## 2017-04-06 NOTE — PLAN OF CARE
Problem: Goal Outcome Summary  Goal: Goal Outcome Summary  OT: 4E: Pt completed BUE AAROM to and not RLE ROM 2/2 balloon pump line, no LLE ROM 2/2 pain in LLE. VSS throughout and  present throughout. Rec TCU when medically stable to address cognitive deficits, weakness, and balance leading to decreased ADL I.

## 2017-04-06 NOTE — PLAN OF CARE
Problem: Intra-Aortic Balloon Pump (Adult)  Goal: Signs and Symptoms of Listed Potential Problems Will be Absent or Manageable (Intra-Aortic Balloon Pump)  Signs and symptoms of listed potential problems will be absent or manageable by discharge/transition of care (reference Intra-Aortic Balloon Pump (Adult) CPG).   Outcome: Improving  Patient s/p IABP placement 4/5. Hemodynamically improving.   PAP 40s/20s, CVP 16-16-13, GENE 2.1-2.3. IABP 1:1, augmenting 100-119.    Dobutamine and bumex gtts infusing at straight rates.  Heparin supratherapeutic, rate decreased x 2.   Frequent complaints of abdominal and bad pain. Managed with PRN medications.   -200 mL/hr. Incontinent of stool x 3.    TF off at 0500 for liver U/S. TF must be off for at least 4 hours prior to procedure.

## 2017-04-06 NOTE — PROGRESS NOTES
04/06/17 1545   General Information   Onset Date 03/27/17   Start of Care Date 04/06/17   Referring Physician Deshaun Barahona MD    Patient Profile Review/OT: Additional Occupational Profile Info See Profile for full history and prior level of function   Patient/Family Goals Statement Goals not stated at the time of evaluation.    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  Due to acute cardiogenic shock he required IABP insertion and was intubated, sent to [facility] at that point.  Swallow evaluation ordered; however, HOB only able to be raised to 30 degrees due to ballooon pump.     Clinical Swallow Evaluation   Oral Musculature generally intact   Structural Abnormalities none present   Dentition present and adequate   Mucosal Quality adequate   Oral Labial Strength and Mobility other (see comments)  (Lingual deviation to R (slight) )   Lingual Strength and Mobility impaired left lateral movement;impaired right lateral movement   Laryngeal Function Cough;Swallow;Throat clear;Voicing initiated;Dry swallow palpated   Oral Musculature Comments WFL   Additional Documentation Yes   Clinical Swallow Eval: Thin Liquid Texture Trial   Mode of Presentation, Thin Liquids spoon;straw;fed by clinician   Volume of Liquid or Food Presented ice chips x3, sip via straw x1    Oral Phase of Swallow Premature pharyngeal entry   Pharyngeal Phase of Swallow impaired;coughing/choking;reduction in laryngeal movement;repeated swallows   Diagnostic Statement Positive s/s of aspiration on sip via straw.   No overt s/s of aspiration on ice chips.    Clinical Swallow Eval: Puree Solid Texture Trial   Mode of Presentation, Puree spoon;fed by clinician   Volume of Puree Presented 1 tsp bite x2    Oral Phase, Puree WFL   Pharyngeal Phase, Puree impaired;repeated swallows   Diagnostic Statement No overt s/s of aspiration.    Swallow Compensations   Swallow Compensations Pacing;Reduce amounts   Esophageal Phase of Swallow   Patient reports or presents with symptoms of esophageal dysphagia No   General Therapy Interventions   Planned Therapy Interventions Dysphagia Treatment   Dysphagia treatment Modified diet education;Instruction of safe swallow strategies;Compensatory strategies for swallowing   Swallow Eval: Clinical Impressions   Skilled Criteria for Therapy Intervention Skilled criteria met.  Treatment indicated.   Functional Assessment Scale (FAS) 5   Treatment Diagnosis Mild oropharyngeal dysphagia    Diet texture recommendations NPO   Recommended Feeding/Eating Techniques small sips/bites   Demonstrates Need for Referral to Another Service occupational therapy;physical therapy;dietitian   Therapy Frequency 3 times/wk   Predicted Duration of Therapy Intervention (days/wks) 3 weeks    Anticipated Discharge Disposition inpatient rehabilitation facility   Risks and Benefits of Treatment have been explained. Yes   Patient, family and/or staff in agreement with Plan of Care Yes   Clinical Impression Comments Pt seen bedside for swallow evaluation per MD orders.  Pt presents with mild oropharyngeal dysphagia characterized by weakness in the setting of poor positioning.  Positive s/s of aspiration observed on water.  Pt tolerated ice chips and purees without overt s/s of aspiration but required multiple swallows to clear pureed textures.  At this time, pt is at increased risk for aspiration due to current level of skills and inability to be positioned upright.  Recommend continue NPO with TF as primary source of  nutrition/hydration/medication.  Pt may have single ice chips with supervision for comfort.  ST to follow as indicated on POC for PO readiness.     Total Evaluation Time   Total Evaluation Time (Minutes) 17

## 2017-04-06 NOTE — PLAN OF CARE
Problem: Goal Outcome Summary  Goal: Goal Outcome Summary  PT 4E: Holding - Per chart review and discussion with care team, pt currently with IABP and is on bedrest restrictions. OT to follow for in bed KANDY UE and L LE ROM as needed. PT will hold at this time and reinitiate service when pt is appropriate for OOB activity.

## 2017-04-06 NOTE — CONSULTS
Nephrology Initial Consult  April 6, 2017      Luke Henao MRN:[id number] YOB: 1965  Date of Admission:3/27/2017  Primary care provider: No Ref-Primary, Physician  Requesting physician: Poornima Hanley, *    ASSESSMENT AND RECOMMENDATIONS:   PEDRO-Unclear baseline Cr as he has not been seen since 2008 prior to this event, does have DM2 which increases likelihood he had underlying CKD, presented with Cr of 2.2.  Imaging did not note any abnormalities, has dawkins and making UOP with diuretics and so no suspicion for obstruction.  PEDRO likely multifactorial with known hypoperfusion on 3/30-3/31 due to cardiogenic shock and tubular toxic effect of vanco particularly with kidneys recovering from contrast nephropathy and reduced autoregulation ability.  Despite anephric rise in Cr he has maintained UOP with bumex gtt and diuril 500mg/BID.  Likely will need HD, currently no indication to start today, will monitor closely.     -Rising Cr although maintaining UOP.  No indication for HD today, likely to need in coming days if trajectory of Cr continues at anephric rate.     -Likely to need RRT in near future if he stops responding to diuretics.      Volume status-Elevated CVP, 13-16 this am, peripheral edema present but maintaining net even I/O with aggressive diuretics.      Electrolytes/pH-Bicarb 30, VBG shows compensated respiratory acidosis, 7.40/55/31/34, SVR normal/high, team starting nitro for afterload reduction.      Hypernatremia-150, likely with free water loss in UOP with diuretics.  Free water deficit is 1.8L to correct to 143, team is starting with 50cc/hr down FT, will follow.  Likely at least 1L of UOP (2.8L total) yesterday was free water.  Will follow response to increase in H2O, will not significantly contribute to volume overload as it will disperse into interstitium.      Cardiogenic Shock-On dobutamine and has IABP for augmentation.       ID-Zosyn 4.5, Vanco on hold, level 25 today.       Recommendations were communicated to primary team via verbal communication.      Jurgen Smith  Clinical Nurse Specialist  [phone]    REASON FOR CONSULT: Requested to evaluate 51 yom with PEDRO post STEMI for management of PEDRO.      HISTORY OF PRESENT ILLNESS:  Luke Henao is a 51 year old with hx of DM2 and smoking who presented to [facility] via EMS the evening of 3/26 with flu-like symptoms, weakness and shoulder pain, dx with inferior STEMI and brought to cath lab, RCA had POBA as he was to be evaluated for CABG given 3-V disease.  Due to acute cardiogenic shock he required IABP insertion and was intubated, sent to [facility] at that point.      Not a good surgical candidate due to poor targets, ended up with 7 stents in all 3 vessels on 3/27, requiring 280cc of contrast.  Re-look on 4/1 showed re-occluded Circ of small territory and patent LAD/RCA stents, minimal contrast used.  Had an initial PEDRO likely due to contrast but was recovering, then did have some significant and prolonged hypotension 3/30 and 3/31, also with Vanco rx and level of 29 and 25 on 3/31 and so now has new PEDRO with Cr up to 5.1 today, was already elevated at presentation at 2.3.       PAST MEDICAL HISTORY:  Reviewed with patient on 04/06/2017   Only hx of DM2 prior to cardiac event.      MEDICATIONS:  PTA Meds  Prior to Admission medications    Medication Sig Last Dose Taking? Auth Provider   ELOCON 0.1 % EX CREA apply to bug bites qd-bid prn   Gaurang Blevins PA-C   OCUFLOX 0.3 % OP SOLN 1-2 gtts in eye q 4-6 hrs    Gaurang Blevins PA-C      Current Meds    vancomycin place rivas - receiving intermittent dosing  1 each Does not apply See Admin Instructions     piperacillin-tazobactam  2.25 g Intravenous Q6H     pantoprazole  40 mg Oral or Feeding Tube Daily     polyethylene glycol  17 g Oral Daily     sodium chloride (PF)  3 mL Intracatheter Q8H     multivitamins with minerals  15 mL Per Feeding Tube Daily     senna-docusate  1-2 tablet  "Oral or NG Tube BID     clopidogrel  75 mg Oral or NG Tube Daily     aspirin  81 mg Oral or Feeding Tube Daily     Infusion Meds    HEParin 300 Units/hr (17 1400)     DOBUTamine 2.5 mcg/kg/min (17 1400)     bumetanide 3 mg/hr (17 1400)     IV fluid REPLACEMENT ONLY Stopped (17 2200)     - MEDICATION INSTRUCTIONS -       IV fluid REPLACEMENT ONLY       insulin (regular) 3 Units/hr (17 1340)       ALLERGIES:    No Known Allergies    REVIEW OF SYSTEMS:  A 10 point review of systems was negative except as noted above.    SOCIAL HISTORY:   Social History     Social History     Marital status:      Spouse name: N/A     Number of children: N/A     Years of education: N/A     Occupational History     Not on file.     Social History Main Topics     Smoking status: Current Every Day Smoker     Packs/day: 0.50     Types: Cigarettes     Smokeless tobacco: Not on file     Alcohol use Not on file     Drug use: Not on file     Sexual activity: Not on file     Other Topics Concern     Not on file     Social History Narrative     No narrative on file     Reviewed with pt's wife, no changes.      FAMILY MEDICAL HISTORY:   No hx of kidney disease in family.  PHYSICAL EXAM:   Temp  Av.3  F (37.4  C)  Min: 96.4  F (35.8  C)  Max: 102.4  F (39.1  C)  Arterial Line MAP (mmHg)  Av.2 mmHg  Min: 0 mmHg  Max: 233 mmHg  Arterial Line BP  Min: 0/0  Max: 251/251      No Data Recorded Resp  Av.4  Min: 0  Max: 43  SpO2  Av.4 %  Min: 83 %  Max: 100 %  FiO2 (%)  Av.9 %  Min: 40 %  Max: 50 %    CVP (mmHg): 12 mmHg  BP (!) 77/47  Temp 98.8  F (37.1  C) (Oral)  Resp 20  Ht 1.575 m (5' 2.01\")  Wt 61.8 kg (136 lb 3.9 oz)  SpO2 96%  BMI 24.91 kg/m2   Date 17 07 - 17 0659   Shift 2198-7755 2550-6205 0806-1958 24 Hour Total   I  N  T  A  K  E   I.V. 266.33   266.33    NG/GT 80   80    Enteral 250   250    Shift Total  (mL/kg) 596.33  (9.65)   596.33  (9.65) "   O  U  T  P  U  T   Urine 1215   1215    Shift Total  (mL/kg) 1215  (19.66)   1215  (19.66)   Weight (kg) 61.8 61.8 61.8 61.8     Admit Weight: 63.1 kg (139 lb 1.8 oz)     GENERAL APPEARANCE: Intubated and sedated  EYES: No scleral icterus  NECK:  Elevated JVD  Pulmonary: lungs clear to auscultation with equal breath sounds bilaterally, no clubbing or cyanosis  CV: Regular rhythm, normal rate, no rub   - JVP elevated   - Edema ++LE edema  GI: soft, nontender, normal bowel sounds  MS: no evidence of inflammation in joints, no muscle tenderness  : + Chavis  SKIN: no rash, warm, dry  NEURO: Vented and sedated.      LABS:   CMP  Recent Labs  Lab 04/06/17  1201 04/06/17  0810 04/06/17  0351 04/06/17  0017  04/05/17  1654  04/05/17  0328  04/04/17  1502  04/04/17  0424  04/03/17  2118  04/03/17  1216  04/02/17  1532   NA  --   --  150*  --   --  148*  --  151*  --  145*  --  146*  --   --   < >  --   < > 146*   POTASSIUM 3.9 3.5 3.4  3.5 4.0  < > 3.8  3.8  < > 3.5  < > 4.3  4.4  < > 3.9  4.1  < >  --   < > 4.0  < > 3.6  3.5   CHLORIDE  --   --  105  --   --  103  --  106  --  102  --  102  --   --   < >  --   < > 104   CO2  --   --  30  --   --  31  --  30  --  33*  --  34*  --   --   < >  --   < > 33*   ANIONGAP  --   --  15*  --   --  14  --  15*  --  9  --  10  --   --   < >  --   < > 10   GLC  --   --  128*  --   --  144*  --  127*  --  284*  --  139*  --   --   < >  --   < > 162*   BUN  --   --  105*  --   --  92*  --  74*  --  62*  --  47*  --   --   < >  --   < > 50*   CR  --   --  5.10*  --   --  4.67*  --  3.77*  --  2.85*  --  2.31*  --   --   < >  --   < > 1.95*   GFRESTIMATED  --   --  12*  --   --  13*  --  17*  --  24*  --  30*  --   --   < >  --   < > 37*   GFRESTBLACK  --   --  15*  --   --  16*  --  21*  --  29*  --  36*  --   --   < >  --   < > 44*   ROB  --   --  7.2*  --   --  7.3*  --  7.2*  --  7.6*  --  7.8*  --   --   < >  --   < > 7.9*   MAG  --   --  3.4*  --   --  3.3*  --  3.3*  --  3.0*   --  3.0*  --   --   < > 2.0  < > 2.3   PHOS  --   --  7.4*  --   --   --   --  6.0*  --   --   --   --   --   --   --  3.0  --  3.2   PROTTOTAL  --   --  6.6*  --   --   --   --  6.9  --   --   --  6.7*  --  6.7*  --   --   < >  --    ALBUMIN  --   --  2.5*  --   --   --   --  2.7*  --   --   --  2.6*  --  2.6*  --   --   < >  --    BILITOTAL  --   --  1.3  --   --   --   --  1.3  --   --   --  1.6*  --  1.2  --   --   < >  --    ALKPHOS  --   --  77  --   --   --   --  69  --   --   --  79  --  88  --   --   < >  --    AST  --   --  67*  --   --   --   --  95*  --   --   --  128*  --  136*  --   --   < >  --    ALT  --   --  396*  --   --   --   --  531*  --   --   --  707*  --  798*  --   --   < >  --    < > = values in this interval not displayed.  CBC  Recent Labs  Lab 04/06/17 0351 04/05/17 0328 04/04/17 0424 04/03/17  1758   HGB 8.9* 9.5* 9.6* 10.3*   WBC 12.7* 14.5* 14.9* 15.1*   RBC 2.95* 3.18* 3.11* 3.39*   HCT 29.0* 30.4* 29.6* 31.9*   MCV 98 96 95 94   MCH 30.2 29.9 30.9 30.4   MCHC 30.7* 31.3* 32.4 32.3   RDW 16.8* 16.7* 16.5* 16.2*    175 157 169     INR  Recent Labs  Lab 04/06/17 0351 04/05/17 0328 04/04/17 0424 04/03/17  0434   INR 1.28* 1.24* 1.21* 1.12   PTT 74* 31 30 27     ABG  Recent Labs  Lab 04/06/17  1201 04/06/17  0914 04/06/17  0810 04/06/17  0620  04/05/17  0850  04/03/17  0754  04/03/17  0630   PH  --   --   --  7.46*  --  7.48*  --  7.46*  --  7.46*   PCO2  --   --   --  45  --  41  --  40  --  43   PO2  --   --   --  97  --  84  --  95  --  105   HCO3  --   --   --  32*  --  31*  --  29*  --  30*   O2PER 2L 2l 2LPM 2L  < > 3L  < > 40.0  < > 40.0   < > = values in this interval not displayed.   URINE STUDIES  Recent Labs   Lab Test  04/05/17   0753  04/03/17   1809  03/31/17   1017  03/29/17   0858  03/28/17   0010   COLOR  Light Yellow  Light Yellow  Light Yellow  Yellow  Light Yellow   APPEARANCE  Clear  Clear  Clear  Slightly Cloudy  Clear   URINEGLC  Negative  Negative   Negative  Negative  Negative   URINEBILI  Negative  Negative  Negative  Negative  Negative   URINEKETONE  Negative  Negative  Negative  Negative  Negative   SG  1.009  1.006  1.007  1.020  1.050*   UBLD  Moderate*  Negative  Negative  Small*  Moderate*   URINEPH  5.0  5.0  5.0  5.0  5.0   PROTEIN  Negative  Negative  Negative  10*  10*   NITRITE  Negative  Negative  Negative  Negative  Negative   LEUKEST  Trace*  Negative  Trace*  Small*  Negative   RBCU  51*   --   1  15*  14*   WBCU  5*   --   2  4*  3*     No lab results found.  PTH  No lab results found.  IRON STUDIES  No lab results found.          I,Bobby Chavis, saw and evaluated this patient with JOSE ELIAS Ralph, 04/06/17, as part of a shared visit.     I personally reviewed major complaints, physical findings, investigations, medications and the overall management plan.        My key findings: PEDRO, likely ATN, not yet dialysis-dependent but evaluate needs very closely.    Key management decisions made by me: not for renal replacement therapy, would use CRRT if need arises.

## 2017-04-06 NOTE — PLAN OF CARE
Problem: Goal Outcome Summary  Goal: Goal Outcome Summary  Outcome: Improving  Patient is alert to self and place, but confused with date and yes/no questions. Afebrile. Moves all extremities. PERRLA. Follows commands. Complaints of pain to bilateral legs extended to hips. Assessed calf circumference, equal at 33 mm. No numbness or tingling. Palpable pulses in all extremities at 2+. IABP remains at 1:1 with pressures 70s - 100s / 40s - 60s, MAPs 65 - 90. Augmenting 100s - 120s, 100%. 2LNC with crackles in upper lungs bilaterally, diminished in the bases. SVO2 47 - 52. Titrated Dobutamine down to 2.5 mcg/kg/min, then increased to 5 mcg/kg/min for increasing lactate from 1.8 - 2.5. Lytes replaced as needed with 1400 K+ of 4.4. Remains on Bumex at 3 mg/hr. Diuresing between 100 - 325 ml/hr. Worked with OT for ROM in bed today. Renal consulted for increasing Creatinine in the 5 - 6 range. Increased water flushes to 200 ml Q4H for 24 hours for Sodium of 150. Plans to hold off on IV fluid as much as possible, monitor patient, and hold off on dialysis or other interventions for now. Will continue with plan of care.

## 2017-04-06 NOTE — PHARMACY-VANCOMYCIN DOSING SERVICE
"Pharmacy Vancomycin Consult Note    Date of Service 2017  Patient's  1965   51 year old, male    Indication: fever, concern for sepsis  Goal Trough Level: 15-20 mg/L  Day of Therapy: 3  Current Vancomycin regimen: 1250 mg IV q24h    Current estimated CrCl = Estimated Creatinine Clearance: 17 mL/min (based on Cr of 4.67).    Creatinine for last 3 days  4/3/2017:  4:34 AM Creatinine 1.73 mg/dL;  4:53 PM Creatinine 1.80 mg/dL  2017:  4:24 AM Creatinine 2.31 mg/dL;  3:02 PM Creatinine 2.85 mg/dL  2017:  3:28 AM Creatinine 3.77 mg/dL;  4:54 PM Creatinine 4.67 mg/dL    Recent Vancomycin Levels (past 3 days)  2017:  8:01 PM Vancomycin Level 25.3 mg/L    Body mass index is 25.36 kg/(m^2).  Height is 5' 2.008\".   Wt Readings from Last 2 Encounters:   17 62.9 kg (138 lb 10.7 oz)   17 62 kg (136 lb 11 oz)       Vancomycin IV Administrations (past 72 hours)                   vancomycin (VANCOCIN) 1,250 mg in NaCl 0.9 % 250 mL intermittent infusion (mg) 1,250 mg New Bag 17 193              Nephrotoxins and other renal medications (Future)    Start     Dose/Rate Route Frequency Ordered Stop    17  vancomycin (VANCOCIN) 1,250 mg in NaCl 0.9 % 250 mL intermittent infusion      1,250 mg Intravenous EVERY 24 HOURS 17 1813      17 0700  DOBUTamine (DOBUTREX) 1,000 mg in D5W 250 mL infusion (adult max conc)      7.5 mcg/kg/min × 62 kg (Dosing Weight)  7 mL/hr  Intravenous CONTINUOUS 17 0700      17 1800  piperacillin-tazobactam (ZOSYN) 3.375 g vial to attach to  mL bag      3.375 g  over 1 Hours Intravenous EVERY 6 HOURS 17 1752      17 1745  bumetanide (BUMEX) 0.25 mg/mL infusion      3 mg/hr  12 mL/hr  Intravenous CONTINUOUS 17 1732           Contrast Orders - past 72 hours (72h ago through future)    Start     Dose/Rate Route Frequency Ordered Stop    17  perflutren diluted 1mL to 2mL with saline (OPTISON) diluted " injection 4 mL      4 mL Intravenous ONCE 04/03/17 2017 04/03/17 2030        Interpretation of levels and current regimen:  Trough level is Supratherapeutic  Has serum creatinine changed > 50% in last 72 hours: Yes  Urine output: diminished somewhat compared to previous days  Renal Function: Worsening    Plan:  1.  Change to intermittent vancomycin, pharmacy will re-dose when level <20 mg/L.  2.  Pharmacy will check trough levels as appropriate in 1-3 Days.    3.  Serum creatinine levels will be ordered daily for the first week of therapy and at least twice weekly for subsequent weeks.      Cecille Addison, PharmD  April 5, 2017              .

## 2017-04-06 NOTE — PROGRESS NOTES
"Cardiology Progress Note    Events and interval changes in past 24 hours:   Cause of MR restricted posterior MV leaflet motion  SVR increasing      Tm 37.3 HR 90s 110s this AM   MAP 70s art line /40s  2L 93%    4am  CVP 13 MAP 73  PA 46/26  PCW  SvO2 52<-50-49-54   BSA 1.6 Hgb 9.6   CI 2.1  CO 3.4  SVR [id number]   PVR    CXR:  pulm edema, Albertson, enteric tube in stomach  I/O 3/2.8since MN 0.6/0.9    Adm wt 139 wt today 136 yesterday 138    Drips  Dobutamine 7.5  Bumex 3  Heparin drip  Insulin drip    Meds  Asa 81  Plavix 75  Vanco, zosyn started 4/3  PPI BID            OBJECTIVE FINDINGS:  BP (!) 77/47  Temp 98  F (36.7  C) (Oral)  Resp 16  Ht 1.575 m (5' 2.01\")  Wt 61.8 kg (136 lb 3.9 oz)  SpO2 93%  BMI 24.91 kg/m2    Gen: alert, following commands  CV: tachy, S1 & S2, S3 holosystolic murmur at apex  Lungs: crackles antriorly  Abd: slightly distended, soft  Ext: DP pulses not palpable but dopplerable per RN, no peripheral edema    Lines  Right arm PICC 3/30  Left radial art line 4/2  Albertson 3/27      Wt Readings from Last 5 Encounters:   04/06/17 61.8 kg (136 lb 3.9 oz)   03/27/17 62 kg (136 lb 11 oz)   07/15/08 68.5 kg (151 lb)     CMP  Recent Labs  Lab 04/06/17  0351 04/06/17  0017 04/05/17 2001 04/05/17  1654  04/05/17  0328  04/04/17  1502  04/04/17  0424  04/03/17  2118  04/03/17  1216  04/02/17  1532  04/01/17  1207   *  --   --  148*  --  151*  --  145*  --  146*  --   --   < >  --   < > 146*  < >  --    POTASSIUM 3.4  3.5 4.0 3.9 3.8  3.8  < > 3.5  < > 4.3  4.4  < > 3.9  4.1  < >  --   < > 4.0  < > 3.6  3.5  < >  --    CHLORIDE 105  --   --  103  --  106  --  102  --  102  --   --   < >  --   < > 104  < >  --    CO2 30  --   --  31  --  30  --  33*  --  34*  --   --   < >  --   < > 33*  < >  --    ANIONGAP 15*  --   --  14  --  15*  --  9  --  10  --   --   < >  --   < > 10  < >  --    *  --   --  144*  --  127*  --  284*  --  139*  --   --   < >  --   < > 162*  < >  --  "   *  --   --  92*  --  74*  --  62*  --  47*  --   --   < >  --   < > 50*  < >  --    CR 5.10*  --   --  4.67*  --  3.77*  --  2.85*  --  2.31*  --   --   < >  --   < > 1.95*  < >  --    GFRESTIMATED 12*  --   --  13*  --  17*  --  24*  --  30*  --   --   < >  --   < > 37*  < >  --    GFRESTBLACK 15*  --   --  16*  --  21*  --  29*  --  36*  --   --   < >  --   < > 44*  < >  --    ROB 7.2*  --   --  7.3*  --  7.2*  --  7.6*  --  7.8*  --   --   < >  --   < > 7.9*  < >  --    MAG 3.4*  --   --  3.3*  --  3.3*  --  3.0*  --  3.0*  --   --   < > 2.0  < > 2.3  < > 2.2   PHOS  --   --   --   --   --  6.0*  --   --   --   --   --   --   --  3.0  --  3.2  --  3.1   PROTTOTAL 6.6*  --   --   --   --  6.9  --   --   --  6.7*  --  6.7*  --   --   < >  --   < >  --    ALBUMIN 2.5*  --   --   --   --  2.7*  --   --   --  2.6*  --  2.6*  --   --   < >  --   < >  --    BILITOTAL 1.3  --   --   --   --  1.3  --   --   --  1.6*  --  1.2  --   --   < >  --   < >  --    ALKPHOS 77  --   --   --   --  69  --   --   --  79  --  88  --   --   < >  --   < >  --    AST 67*  --   --   --   --  95*  --   --   --  128*  --  136*  --   --   < >  --   < >  --    *  --   --   --   --  531*  --   --   --  707*  --  798*  --   --   < >  --   < >  --    < > = values in this interval not displayed.  CBC    Recent Labs  Lab 04/06/17 0351 04/05/17 0328 04/04/17  0424 04/03/17  1758   WBC 12.7* 14.5* 14.9* 15.1*   RBC 2.95* 3.18* 3.11* 3.39*   HGB 8.9* 9.5* 9.6* 10.3*   HCT 29.0* 30.4* 29.6* 31.9*   MCV 98 96 95 94   MCH 30.2 29.9 30.9 30.4   MCHC 30.7* 31.3* 32.4 32.3   RDW 16.8* 16.7* 16.5* 16.2*    175 157 169     INR    Recent Labs  Lab 04/06/17 0351 04/05/17 0328 04/04/17  0424 04/03/17  0434   INR 1.28* 1.24* 1.21* 1.12     Arterial Blood Gas  Recent Labs  Lab 04/06/17 0351 04/06/17  0017 04/05/17 2001 04/05/17  1654  04/05/17  0850  04/03/17  0754  04/03/17  0630   PH  --   --   --   --   --  7.48*  --  7.46*  --   7.46*   PCO2  --   --   --   --   --  41  --  40  --  43   PO2  --   --   --   --   --  84  --  95  --  105   HCO3  --   --   --   --   --  31*  --  29*  --  30*   O2PER 2L 2L 2L 3L  < > 3L  < > 40.0  < > 40.0   < > = values in this interval not displayed.      Labs and Imaging  Reviewed in epic  Flu swab negative    Echo 3/27  The visual ejection fraction is estimated at 30-35%.  There is extensive inferior, inferoseptal, and inferolateral wall akinesis.  Basal/mid lateral wall hypokinesis  There is moderate to severe septal hypokinesis.  Septal wall motion abnormality may reflect pacemaker activation.  There is a catheter/pacemaker lead seen in the right ventricle.  The right ventricle is mildly dilated.  Severely decreased right ventricular systolic function  There is no pericardial effusion.  The rhythm was paced.  Compared to the prior study, the LVEF appears somewhat decreased. Septal wall  motion abnormality larger- may reflect, in part, that patient in sinus tach on  prior study, and ventricular paced now. No hemodynamically significant  valvular abnormalities on 2D or color flow imaging    CT chest/abdomen 3/27   IMPRESSION:   1. Small mediastinal hemorrhage, small bilateral pleural effusions  which may be hemorrhagic. Small intraperitoneal hemorrhage. Minimal  pericardial hemorrhage.  2. Minimal pneumoperitoneum in the left paracolic gutter, of unknown  significance. No pneumatosis as clinically questioned.  3. IABP slightly low in position.  4. Bilateral pulmonary dependent consolidations represent compression  atelectasis, however differentials include aspiration.    Cath 3/26 [facility]  SUMMARY:   --Inferior STEMI w/ late presentation. Culprit dictated by complete  heart block, and cardiogenic shock. Emergent echo in the Cath Lab also  shows significant RV dysfunction.  --Three vessel coronary artery disease with diffuse coronary disease  out to the distal vessels  --Successful POBA of the prox  and mid-RCA. Stent was not placed, in  anticipation he may need to be considered for bypass surgery in the  near future  --Insertion of a temporary pacemaker for bradycardia (AVB) and  hypotension  --Insertion of a IABP  --Upper GI bleed consistent with [place]lin-[place]    [facility] 3/27    CT head 3/28: normal     Echo 3/29  Interpretation Summary  Limited study. Ischemic CM. Moderately (EF 30-35%) reduced left ventricular  function is present. Traced at 32%.  Posterior wall akinesis is present. Lateral wall akinesis is present. Inferior  wall akinesis is present.  Right ventricular function, chamber size, wall motion, and thickness are  normal.  Dilation of the inferior vena cava is present with abnormal respiratory  variation in diameter.     Compared to prior study, RV fxn is improved.    Echo 3/31  Left Ventricle  The Ejection Fraction is estimated at 50-55%. Inferior,posterior,lateral,basal  septal wall akinesis as reported before. No change.    CORONARY ANGIOGRAM 4/1:   1. Both coronary arteries arise from their respective cusps.  2. Right dominant.  3. LM has mild luminal irregularities.   4. LAD supplies the apex along with the RCA (type 2 LAD) and gives rise to septal perforators and a large and branching D1. There are widely patent stents extending from the proximal to mid LAD. The dLAD has mild to moderate disease to 30% stenosis. The D1 is jailed by the LAD stents, but has EBER 3 flow with disease to 30% stenosis.    5. The small ramus is no longer present.  6. LCX is occluded at the ostium.   7. RCA gives rise to PL branches and supplies PDA. There are widely patent stents extending from the proximal to mid RCA. The remainder of the mRCA has disease to 50% stenosis and the dRCA has disease to 10% stenosis. The RPDA has a widely patent stent and the remainder of the artery has mild disease to 10% stenosis. The RPLA has small vessels with diffuse disease including a distal branch occlusion. The RPLA  supplies some small collateral branches to the dLCx.     COMPLICATIONS:  1. None     SUMMARY:   1. Cardiogenic shock following an inferior STEMI.  2. The LCx re-occlusion is not surprising as the recently revascularized LCx  had poor outflow and the revascularized portion did not supply a large territory (limited to no flow was present distal to the stented segment immediately following stenting). Repeat revascularization of the LCx would result in the same outcome of repeat closure and also risk embolizing thrombus from the LCx into the LAD so no treatment of the occluded LCx was performed.   3. Three vessel coronary artery disease with patent LAD and RCA stents and an occluded LCx.    Echo 4/3  Left ventricular size is normal.  The Ejection Fraction is estimated at 35-40%.  Inferior wall akinesis is present.  Lateral wall akinesis is present.  Posterior wall akinesis is present.  Right ventricular function, chamber size, wall motion, and thickness are  normal.  Moderate mitral insufficiency is present.  Moderate tricuspid insufficiency is present.  Right ventricular systolic pressure is 47mmHg above the right atrial pressure.  The inferior vena cava is normal.    Echo 4/5  Moderately (EF 35-40%) reduced left ventricular function is present. Traced at  40%.  Moderate mitral insufficiency is present.  The cause of the mitral insufficiency appears to be restricted posterior  leaflet from posterior MI. No mitral leaflet pathology seen.  Dilation of the inferior vena cava is present with abnormal respiratory  variation in diameter.    CT abd without contrast 4/4  IMPRESSION:   1. No evidence of ischemic bowel, obstruction, or intra-abdominal  infection.  2. Bibasilar patchy pulmonary opacities likely represent combination  of aspiration and atelectasis.  3. Small amount of simple free fluid within the lower abdomen.  4. Small left retroperitoneal hematoma along the iliac vasculature  likely postprocedural.   5.  Unchanged size of bilateral pleural effusions, which now consist of  simple fluid.    Plan for today  -Nephrology consult given worsening PEDRO. Continue bumex 3mg/hr  - Since CI>2 reduced dobutamine to 5 (from 7.5) at 9am Since CI remained at 2 at 10 am and noon, reduced dobutamine to 2.5 at noon.     Card  # Cardiogenic shock 4/3- from underlying 3v CAD-has had high SVR and low CI   # Mixed shock initially: Cardiogenic from biventricular failure from inferior STEMI and distributive shock from post-MI SIRS response vs aspiration pneumonia/pneumonitis from possible aspiration during cath at [facility] on 3/26  # Due to inferior wall STEMI. S/P 7 stents to LAD, circ, RCA (angiogram report pending). Now with IABP, temporary pacemaker after 3rd deg heart block in cath lab at [facility] on 3/26   # Small pericardial hemorrhage  - IABP removed 4/2  -temp pacer removed 3/29  - Dual antiplatelet therapy with ASA 81mg and plavix 75mg qday  -Hydralazine, Isordil  -eventually will start statin     # Neuro  -alert, follows commands        Respiratory  #Intubated for airway protection due to mental status and emesis on 3/26, extubated 4/3  #Probable Aspiration PNA/pneumonitis vs MSSA pneumonia  #Small mediastinal hemorrhage       # ID  -fever on 4/3-type 2 MI vs infection (unclear source)  . Sepsis from aspiration pneumonia/pneumonitis vs MSSA pneumonia vs post-MI SIRS response initially on 3/28  - repeat cultures if febrile  - was on Unasyn. Switched to vanco and zosyn on 4/3 for fever and elevated lactate concern for sepsis       # Endocrine  T2DM: HA1C 7.5 yesterday on admission. BG climibing to 170s since admission. Will likely continue to rise with steroids.   - Insulin gtt per nurising protocol with hypoglycemia precautions.      # GI  -possible ileus vs constipation-passing gas  -bowel regimen    Minimal pneumoperitoneum, unclear etiology: Seen by General surgery.  -had bloody NG output earlier in hospitalization.  Was on PPI BID switched to once daily 4/3   -Conservative Mx for now    Shock liver injury with coagulopathy-improving trend LFTs,   -Vit K 2.5mg 3/28, Vit K 3/29       # Renal/  worsening renal injury- from hypoperfusion from cardiogenic shock 4/5 plus central venous congestion plus contrast nephropathy  -Diuresis  - Daily Cr  - Avoid nephrotoxins as able  - Chavis placed 3/26    -Hypernatremia- resolved    #Hem  -thrombocytopenia-likely from balloon pump  -Anemia- multifactorial     FEN: feeding tube  Code: FULL  PPx:  PPI 40mg daily, senna PRN  Dispo: pending stability    Will staff with Dr. Talon Park  General Cardiology fellow, PGY4  Pager [phone]    I have reviewed today's vital signs, notes, medications, labs and imaging.  I have also seen and examined the patient and agree with the findings and plan as outlined above.  Pt with cardiogenic shock requiring IABP and afterload reduction.  Course complicated by PEDRO.  Plan is to advance hydralazine and consider permanent MCS.  Pt seen X 3 today for total critical care time 30 min.     Vikram Jean-Baptiste MD, PhD  Professor, Heart Failure and Cardiac Transplantation  [facility]

## 2017-04-06 NOTE — PROGRESS NOTES
CLINICAL NUTRITION SERVICES - progress toward nutrition POC. See 4/4 note for full assessment.    -Labs/Renal: Na+ 150, K+ 3.4 --> 3.5 --> 3.9 today. , Cr 5.1, phos 7.4.   -Meds: Remains on bumex gtt.     Recommendations  -Recommend change to Nepro (low K+/phos formula) @ goal 40 ml/hr (960 ml/day) to provide 1728 kcals (28 kcal/kg), 78 g PRO (1.3 g/kg), 701 ml free H2O, 155 g CHO and 12 g Fiber daily.    Interventions  -Discussed w/ Cards 2 MD who changed free water flushes to 200 mL q4h x 24 hours then back to 30 mL q4h.  -Discussed w/ nephrology who agreed w/ change to Nepro formula given above labs.   -Ordered above change to Nepro.     RD will continue to follow.    Paula Guerrero RD, LD  Pager: [phone]

## 2017-04-06 NOTE — PLAN OF CARE
Problem: Goal Outcome Summary  Goal: Goal Outcome Summary  SLP: Pt seen bedside for swallow evaluation per MD orders. Pt presents with mild oropharyngeal dysphagia characterized by weakness in the setting of poor positioning. Positive s/s of aspiration observed on water. Pt tolerated ice chips and purees without overt s/s of aspiration but required multiple swallows to clear pureed textures. At this time, pt is at increased risk for aspiration due to current level of skills and inability to be positioned upright. Recommend continue NPO with TF as primary source of nutrition/hydration/medication. Pt may have single ice chips with supervision for comfort. ST to follow as indicated on POC for PO readiness.

## 2017-04-07 ENCOUNTER — APPOINTMENT (OUTPATIENT)
Dept: ULTRASOUND IMAGING | Facility: CLINIC | Age: 52
DRG: 228 | End: 2017-04-07
Attending: INTERNAL MEDICINE
Payer: COMMERCIAL

## 2017-04-07 ENCOUNTER — APPOINTMENT (OUTPATIENT)
Dept: OCCUPATIONAL THERAPY | Facility: CLINIC | Age: 52
DRG: 228 | End: 2017-04-07
Attending: INTERNAL MEDICINE
Payer: COMMERCIAL

## 2017-04-07 ENCOUNTER — OFFICE VISIT (OUTPATIENT)
Dept: INTERPRETER SERVICES | Facility: CLINIC | Age: 52
End: 2017-04-07

## 2017-04-07 ENCOUNTER — APPOINTMENT (OUTPATIENT)
Dept: GENERAL RADIOLOGY | Facility: CLINIC | Age: 52
DRG: 228 | End: 2017-04-07
Attending: INTERNAL MEDICINE
Payer: COMMERCIAL

## 2017-04-07 ENCOUNTER — APPOINTMENT (OUTPATIENT)
Dept: CT IMAGING | Facility: CLINIC | Age: 52
DRG: 228 | End: 2017-04-07
Attending: INTERNAL MEDICINE
Payer: COMMERCIAL

## 2017-04-07 ENCOUNTER — APPOINTMENT (OUTPATIENT)
Dept: CARDIOLOGY | Facility: CLINIC | Age: 52
DRG: 228 | End: 2017-04-07
Attending: INTERNAL MEDICINE
Payer: COMMERCIAL

## 2017-04-07 LAB
ALBUMIN SERPL-MCNC: 2.6 G/DL (ref 3.4–5)
ALP SERPL-CCNC: 95 U/L (ref 40–150)
ALT SERPL W P-5'-P-CCNC: 312 U/L (ref 0–70)
ANION GAP SERPL CALCULATED.3IONS-SCNC: 10 MMOL/L (ref 3–14)
ANION GAP SERPL CALCULATED.3IONS-SCNC: 7 MMOL/L (ref 3–14)
ANION GAP SERPL CALCULATED.3IONS-SCNC: 7 MMOL/L (ref 3–14)
ANION GAP SERPL CALCULATED.3IONS-SCNC: 8 MMOL/L (ref 3–14)
APTT PPP: 34 SEC (ref 22–37)
AST SERPL W P-5'-P-CCNC: 60 U/L (ref 0–45)
BASE EXCESS BLDV CALC-SCNC: 11.2 MMOL/L
BASE EXCESS BLDV CALC-SCNC: 13.1 MMOL/L
BASE EXCESS BLDV CALC-SCNC: 13.6 MMOL/L
BASE EXCESS BLDV CALC-SCNC: 14 MMOL/L
BASE EXCESS BLDV CALC-SCNC: 14.3 MMOL/L
BASE EXCESS BLDV CALC-SCNC: 14.4 MMOL/L
BILIRUB DIRECT SERPL-MCNC: 0.5 MG/DL (ref 0–0.2)
BILIRUB SERPL-MCNC: 1.3 MG/DL (ref 0.2–1.3)
BUN SERPL-MCNC: 101 MG/DL (ref 7–30)
BUN SERPL-MCNC: 106 MG/DL (ref 7–30)
BUN SERPL-MCNC: 111 MG/DL (ref 7–30)
BUN SERPL-MCNC: 98 MG/DL (ref 7–30)
C DIFF TOX B STL QL: NORMAL
CA-I BLD-MCNC: 3.8 MG/DL (ref 4.4–5.2)
CALCIUM SERPL-MCNC: 7.3 MG/DL (ref 8.5–10.1)
CALCIUM SERPL-MCNC: 7.4 MG/DL (ref 8.5–10.1)
CALCIUM SERPL-MCNC: 7.6 MG/DL (ref 8.5–10.1)
CALCIUM SERPL-MCNC: 7.6 MG/DL (ref 8.5–10.1)
CHLORIDE SERPL-SCNC: 106 MMOL/L (ref 94–109)
CHLORIDE SERPL-SCNC: 110 MMOL/L (ref 94–109)
CHLORIDE SERPL-SCNC: 111 MMOL/L (ref 94–109)
CHLORIDE SERPL-SCNC: 114 MMOL/L (ref 94–109)
CO2 SERPL-SCNC: 38 MMOL/L (ref 20–32)
CO2 SERPL-SCNC: 41 MMOL/L (ref 20–32)
CREAT SERPL-MCNC: 3.1 MG/DL (ref 0.66–1.25)
CREAT SERPL-MCNC: 3.35 MG/DL (ref 0.66–1.25)
CREAT SERPL-MCNC: 3.48 MG/DL (ref 0.66–1.25)
CREAT SERPL-MCNC: 4.26 MG/DL (ref 0.66–1.25)
ERYTHROCYTE [DISTWIDTH] IN BLOOD BY AUTOMATED COUNT: 17.1 % (ref 10–15)
GFR SERPL CREATININE-BSD FRML MDRD: 15 ML/MIN/1.7M2
GFR SERPL CREATININE-BSD FRML MDRD: 19 ML/MIN/1.7M2
GFR SERPL CREATININE-BSD FRML MDRD: 20 ML/MIN/1.7M2
GFR SERPL CREATININE-BSD FRML MDRD: 21 ML/MIN/1.7M2
GLUCOSE BLDC GLUCOMTR-MCNC: 103 MG/DL (ref 70–99)
GLUCOSE BLDC GLUCOMTR-MCNC: 107 MG/DL (ref 70–99)
GLUCOSE BLDC GLUCOMTR-MCNC: 112 MG/DL (ref 70–99)
GLUCOSE BLDC GLUCOMTR-MCNC: 116 MG/DL (ref 70–99)
GLUCOSE BLDC GLUCOMTR-MCNC: 120 MG/DL (ref 70–99)
GLUCOSE BLDC GLUCOMTR-MCNC: 127 MG/DL (ref 70–99)
GLUCOSE BLDC GLUCOMTR-MCNC: 129 MG/DL (ref 70–99)
GLUCOSE BLDC GLUCOMTR-MCNC: 132 MG/DL (ref 70–99)
GLUCOSE BLDC GLUCOMTR-MCNC: 140 MG/DL (ref 70–99)
GLUCOSE BLDC GLUCOMTR-MCNC: 141 MG/DL (ref 70–99)
GLUCOSE BLDC GLUCOMTR-MCNC: 150 MG/DL (ref 70–99)
GLUCOSE BLDC GLUCOMTR-MCNC: 150 MG/DL (ref 70–99)
GLUCOSE BLDC GLUCOMTR-MCNC: 151 MG/DL (ref 70–99)
GLUCOSE BLDC GLUCOMTR-MCNC: 155 MG/DL (ref 70–99)
GLUCOSE BLDC GLUCOMTR-MCNC: 167 MG/DL (ref 70–99)
GLUCOSE BLDC GLUCOMTR-MCNC: 170 MG/DL (ref 70–99)
GLUCOSE BLDC GLUCOMTR-MCNC: 172 MG/DL (ref 70–99)
GLUCOSE BLDC GLUCOMTR-MCNC: 181 MG/DL (ref 70–99)
GLUCOSE BLDC GLUCOMTR-MCNC: 187 MG/DL (ref 70–99)
GLUCOSE BLDC GLUCOMTR-MCNC: 83 MG/DL (ref 70–99)
GLUCOSE BLDC GLUCOMTR-MCNC: 94 MG/DL (ref 70–99)
GLUCOSE SERPL-MCNC: 110 MG/DL (ref 70–99)
GLUCOSE SERPL-MCNC: 115 MG/DL (ref 70–99)
GLUCOSE SERPL-MCNC: 156 MG/DL (ref 70–99)
GLUCOSE SERPL-MCNC: 176 MG/DL (ref 70–99)
HCO3 BLDV-SCNC: 37 MMOL/L (ref 21–28)
HCO3 BLDV-SCNC: 38 MMOL/L (ref 21–28)
HCO3 BLDV-SCNC: 39 MMOL/L (ref 21–28)
HCO3 BLDV-SCNC: 39 MMOL/L (ref 21–28)
HCO3 BLDV-SCNC: 40 MMOL/L (ref 21–28)
HCO3 BLDV-SCNC: 40 MMOL/L (ref 21–28)
HCT VFR BLD AUTO: 32.6 % (ref 40–53)
HEMOCCULT STL QL: NEGATIVE
HGB BLD-MCNC: 9.9 G/DL (ref 13.3–17.7)
INR PPP: 1.23 (ref 0.86–1.14)
LACTATE BLD-SCNC: 0.8 MMOL/L (ref 0.7–2.1)
LACTATE BLD-SCNC: 1 MMOL/L (ref 0.7–2.1)
LACTATE BLD-SCNC: 1 MMOL/L (ref 0.7–2.1)
LACTATE BLD-SCNC: 1.3 MMOL/L (ref 0.7–2.1)
LACTATE BLD-SCNC: 1.3 MMOL/L (ref 0.7–2.1)
LACTATE BLD-SCNC: 1.5 MMOL/L (ref 0.7–2.1)
LACTATE BLD-SCNC: 1.6 MMOL/L (ref 0.7–2.1)
LMWH PPP CHRO-ACNC: 0.4 IU/ML
LMWH PPP CHRO-ACNC: NORMAL IU/ML
LMWH PPP CHRO-ACNC: NORMAL IU/ML
MAGNESIUM SERPL-MCNC: 2.8 MG/DL (ref 1.6–2.3)
MAGNESIUM SERPL-MCNC: 3 MG/DL (ref 1.6–2.3)
MCH RBC QN AUTO: 30.6 PG (ref 26.5–33)
MCHC RBC AUTO-ENTMCNC: 30.4 G/DL (ref 31.5–36.5)
MCV RBC AUTO: 101 FL (ref 78–100)
O2/TOTAL GAS SETTING VFR VENT: ABNORMAL %
OXYHGB MFR BLDV: 46 %
OXYHGB MFR BLDV: 55 %
OXYHGB MFR BLDV: 56 %
OXYHGB MFR BLDV: 56 %
OXYHGB MFR BLDV: 58 %
OXYHGB MFR BLDV: 59 %
PCO2 BLDV: 55 MM HG (ref 40–50)
PCO2 BLDV: 57 MM HG (ref 40–50)
PCO2 BLDV: 58 MM HG (ref 40–50)
PCO2 BLDV: 59 MM HG (ref 40–50)
PCO2 BLDV: 59 MM HG (ref 40–50)
PCO2 BLDV: 60 MM HG (ref 40–50)
PH BLDV: 7.4 PH (ref 7.32–7.43)
PH BLDV: 7.43 PH (ref 7.32–7.43)
PH BLDV: 7.44 PH (ref 7.32–7.43)
PH BLDV: 7.44 PH (ref 7.32–7.43)
PH BLDV: 7.45 PH (ref 7.32–7.43)
PH BLDV: 7.45 PH (ref 7.32–7.43)
PLATELET # BLD AUTO: 223 10E9/L (ref 150–450)
PO2 BLDV: 30 MM HG (ref 25–47)
PO2 BLDV: 33 MM HG (ref 25–47)
PO2 BLDV: 34 MM HG (ref 25–47)
PO2 BLDV: 34 MM HG (ref 25–47)
PO2 BLDV: 36 MM HG (ref 25–47)
PO2 BLDV: 38 MM HG (ref 25–47)
POTASSIUM BLD-SCNC: 3.2 MMOL/L (ref 3.4–5.3)
POTASSIUM BLD-SCNC: 3.3 MMOL/L (ref 3.4–5.3)
POTASSIUM BLD-SCNC: 3.4 MMOL/L (ref 3.4–5.3)
POTASSIUM BLD-SCNC: 3.4 MMOL/L (ref 3.4–5.3)
POTASSIUM BLD-SCNC: 3.7 MMOL/L (ref 3.4–5.3)
POTASSIUM BLD-SCNC: 3.9 MMOL/L (ref 3.4–5.3)
POTASSIUM BLD-SCNC: 4.8 MMOL/L (ref 3.4–5.3)
POTASSIUM SERPL-SCNC: 3.1 MMOL/L (ref 3.4–5.3)
POTASSIUM SERPL-SCNC: 3.1 MMOL/L (ref 3.4–5.3)
POTASSIUM SERPL-SCNC: 3.3 MMOL/L (ref 3.4–5.3)
POTASSIUM SERPL-SCNC: 3.9 MMOL/L (ref 3.4–5.3)
PROT SERPL-MCNC: 6.9 G/DL (ref 6.8–8.8)
RBC # BLD AUTO: 3.24 10E12/L (ref 4.4–5.9)
SODIUM SERPL-SCNC: 151 MMOL/L (ref 133–144)
SODIUM SERPL-SCNC: 158 MMOL/L (ref 133–144)
SODIUM SERPL-SCNC: 159 MMOL/L (ref 133–144)
SODIUM SERPL-SCNC: 160 MMOL/L (ref 133–144)
SPECIMEN SOURCE: NORMAL
VANCOMYCIN SERPL-MCNC: 14.1 MG/L
WBC # BLD AUTO: 12.8 10E9/L (ref 4–11)

## 2017-04-07 PROCEDURE — 40000275 ZZH STATISTIC RCP TIME EA 10 MIN

## 2017-04-07 PROCEDURE — T1013 SIGN LANG/ORAL INTERPRETER: HCPCS | Mod: U3

## 2017-04-07 PROCEDURE — 20000004 ZZH R&B ICU UMMC

## 2017-04-07 PROCEDURE — 76705 ECHO EXAM OF ABDOMEN: CPT

## 2017-04-07 PROCEDURE — 80048 BASIC METABOLIC PNL TOTAL CA: CPT | Performed by: INTERNAL MEDICINE

## 2017-04-07 PROCEDURE — 25000125 ZZHC RX 250: Performed by: CLINICAL NURSE SPECIALIST

## 2017-04-07 PROCEDURE — 85520 HEPARIN ASSAY: CPT | Performed by: INTERNAL MEDICINE

## 2017-04-07 PROCEDURE — 25000132 ZZH RX MED GY IP 250 OP 250 PS 637

## 2017-04-07 PROCEDURE — 25000132 ZZH RX MED GY IP 250 OP 250 PS 637: Performed by: INTERNAL MEDICINE

## 2017-04-07 PROCEDURE — 71010 XR CHEST PORT 1 VW: CPT | Mod: 77

## 2017-04-07 PROCEDURE — 99291 CRITICAL CARE FIRST HOUR: CPT | Mod: GC | Performed by: INTERNAL MEDICINE

## 2017-04-07 PROCEDURE — 85027 COMPLETE CBC AUTOMATED: CPT | Performed by: INTERNAL MEDICINE

## 2017-04-07 PROCEDURE — 82330 ASSAY OF CALCIUM: CPT | Performed by: INTERNAL MEDICINE

## 2017-04-07 PROCEDURE — 40000196 ZZH STATISTIC RAPCV CVP MONITORING

## 2017-04-07 PROCEDURE — 25000125 ZZHC RX 250: Performed by: INTERNAL MEDICINE

## 2017-04-07 PROCEDURE — 93922 UPR/L XTREMITY ART 2 LEVELS: CPT

## 2017-04-07 PROCEDURE — 25000128 H RX IP 250 OP 636: Performed by: INTERNAL MEDICINE

## 2017-04-07 PROCEDURE — 97110 THERAPEUTIC EXERCISES: CPT | Mod: GO

## 2017-04-07 PROCEDURE — 25000132 ZZH RX MED GY IP 250 OP 250 PS 637: Performed by: STUDENT IN AN ORGANIZED HEALTH CARE EDUCATION/TRAINING PROGRAM

## 2017-04-07 PROCEDURE — 82805 BLOOD GASES W/O2 SATURATION: CPT | Performed by: INTERNAL MEDICINE

## 2017-04-07 PROCEDURE — 25000125 ZZHC RX 250: Performed by: STUDENT IN AN ORGANIZED HEALTH CARE EDUCATION/TRAINING PROGRAM

## 2017-04-07 PROCEDURE — 83605 ASSAY OF LACTIC ACID: CPT | Performed by: INTERNAL MEDICINE

## 2017-04-07 PROCEDURE — 84132 ASSAY OF SERUM POTASSIUM: CPT | Performed by: INTERNAL MEDICINE

## 2017-04-07 PROCEDURE — 93925 LOWER EXTREMITY STUDY: CPT

## 2017-04-07 PROCEDURE — 85730 THROMBOPLASTIN TIME PARTIAL: CPT | Performed by: INTERNAL MEDICINE

## 2017-04-07 PROCEDURE — 85610 PROTHROMBIN TIME: CPT | Performed by: INTERNAL MEDICINE

## 2017-04-07 PROCEDURE — S0171 BUMETANIDE 0.5 MG: HCPCS | Performed by: INTERNAL MEDICINE

## 2017-04-07 PROCEDURE — 80053 COMPREHEN METABOLIC PANEL: CPT | Performed by: INTERNAL MEDICINE

## 2017-04-07 PROCEDURE — 40000048 ZZH STATISTIC DAILY SWAN MONITORING

## 2017-04-07 PROCEDURE — 40000076 ZZH STATISTIC IABP MONITORING

## 2017-04-07 PROCEDURE — 25000125 ZZHC RX 250: Performed by: HOSPITALIST

## 2017-04-07 PROCEDURE — 93306 TTE W/DOPPLER COMPLETE: CPT

## 2017-04-07 PROCEDURE — 80202 ASSAY OF VANCOMYCIN: CPT | Performed by: INTERNAL MEDICINE

## 2017-04-07 PROCEDURE — 40000281 ZZH STATISTIC TRANSPORT TIME EA 15 MIN

## 2017-04-07 PROCEDURE — 25000128 H RX IP 250 OP 636: Performed by: CLINICAL NURSE SPECIALIST

## 2017-04-07 PROCEDURE — 40000264 ECHO COMPLETE BUBBLE

## 2017-04-07 PROCEDURE — 00000146 ZZHCL STATISTIC GLUCOSE BY METER IP

## 2017-04-07 PROCEDURE — 97535 SELF CARE MNGMENT TRAINING: CPT | Mod: GO

## 2017-04-07 PROCEDURE — 87493 C DIFF AMPLIFIED PROBE: CPT | Performed by: INTERNAL MEDICINE

## 2017-04-07 PROCEDURE — 83735 ASSAY OF MAGNESIUM: CPT | Performed by: INTERNAL MEDICINE

## 2017-04-07 PROCEDURE — 70450 CT HEAD/BRAIN W/O DYE: CPT

## 2017-04-07 PROCEDURE — 25000132 ZZH RX MED GY IP 250 OP 250 PS 637: Performed by: HOSPITALIST

## 2017-04-07 PROCEDURE — 25000128 H RX IP 250 OP 636: Performed by: HOSPITALIST

## 2017-04-07 PROCEDURE — 82248 BILIRUBIN DIRECT: CPT | Performed by: INTERNAL MEDICINE

## 2017-04-07 PROCEDURE — 40000133 ZZH STATISTIC OT WARD VISIT

## 2017-04-07 PROCEDURE — 93306 TTE W/DOPPLER COMPLETE: CPT | Mod: 26 | Performed by: INTERNAL MEDICINE

## 2017-04-07 PROCEDURE — 82272 OCCULT BLD FECES 1-3 TESTS: CPT | Performed by: SURGERY

## 2017-04-07 PROCEDURE — 71010 XR CHEST PORT 1 VW: CPT

## 2017-04-07 PROCEDURE — 27210432 ZZH NUTRITION PRODUCT RENAL BASIC LITER

## 2017-04-07 RX ORDER — HYDRALAZINE HYDROCHLORIDE 50 MG/1
50 TABLET, FILM COATED ORAL 4 TIMES DAILY
Status: DISCONTINUED | OUTPATIENT
Start: 2017-04-08 | End: 2017-04-08

## 2017-04-07 RX ORDER — HYDRALAZINE HYDROCHLORIDE 25 MG/1
25 TABLET, FILM COATED ORAL ONCE
Status: COMPLETED | OUTPATIENT
Start: 2017-04-07 | End: 2017-04-07

## 2017-04-07 RX ORDER — HYDRALAZINE HYDROCHLORIDE 20 MG/ML
10 INJECTION INTRAMUSCULAR; INTRAVENOUS ONCE
Status: COMPLETED | OUTPATIENT
Start: 2017-04-07 | End: 2017-04-07

## 2017-04-07 RX ORDER — HYDRALAZINE HYDROCHLORIDE 25 MG/1
25 TABLET, FILM COATED ORAL 4 TIMES DAILY
Status: DISCONTINUED | OUTPATIENT
Start: 2017-04-07 | End: 2017-04-07

## 2017-04-07 RX ORDER — DEXTROSE MONOHYDRATE 50 MG/ML
INJECTION, SOLUTION INTRAVENOUS CONTINUOUS
Status: DISPENSED | OUTPATIENT
Start: 2017-04-07 | End: 2017-04-08

## 2017-04-07 RX ADMIN — HUMAN INSULIN 3 UNITS/HR: 100 INJECTION, SOLUTION SUBCUTANEOUS at 13:14

## 2017-04-07 RX ADMIN — OXYCODONE HYDROCHLORIDE 10 MG: 5 TABLET ORAL at 04:56

## 2017-04-07 RX ADMIN — HUMAN INSULIN 5 UNITS/HR: 100 INJECTION, SOLUTION SUBCUTANEOUS at 23:02

## 2017-04-07 RX ADMIN — OXYCODONE HYDROCHLORIDE 10 MG: 5 TABLET ORAL at 17:35

## 2017-04-07 RX ADMIN — DOBUTAMINE 5 MCG/KG/MIN: 12.5 INJECTION, SOLUTION, CONCENTRATE INTRAVENOUS at 21:37

## 2017-04-07 RX ADMIN — DEXTROSE MONOHYDRATE: 50 INJECTION, SOLUTION INTRAVENOUS at 10:35

## 2017-04-07 RX ADMIN — Medication 1 MG/HR: at 11:19

## 2017-04-07 RX ADMIN — POTASSIUM CHLORIDE 40 MEQ: 1.5 POWDER, FOR SOLUTION ORAL at 13:57

## 2017-04-07 RX ADMIN — Medication 12.5 MG: at 15:38

## 2017-04-07 RX ADMIN — OXYCODONE HYDROCHLORIDE 10 MG: 5 TABLET ORAL at 13:08

## 2017-04-07 RX ADMIN — POTASSIUM CHLORIDE 20 MEQ: 1.5 POWDER, FOR SOLUTION ORAL at 08:23

## 2017-04-07 RX ADMIN — POTASSIUM CHLORIDE 40 MEQ: 1.5 POWDER, FOR SOLUTION ORAL at 04:35

## 2017-04-07 RX ADMIN — HYDRALAZINE HYDROCHLORIDE 25 MG: 25 TABLET ORAL at 23:08

## 2017-04-07 RX ADMIN — MULTIVIT AND MINERALS-FERROUS GLUCONATE 9 MG IRON/15 ML ORAL LIQUID 15 ML: at 07:59

## 2017-04-07 RX ADMIN — CLOPIDOGREL 75 MG: 75 TABLET, FILM COATED ORAL at 07:59

## 2017-04-07 RX ADMIN — OXYCODONE HYDROCHLORIDE 10 MG: 5 TABLET ORAL at 21:31

## 2017-04-07 RX ADMIN — PANTOPRAZOLE SODIUM 40 MG: 40 TABLET, DELAYED RELEASE ORAL at 08:00

## 2017-04-07 RX ADMIN — HYDRALAZINE HYDROCHLORIDE 25 MG: 25 TABLET ORAL at 20:38

## 2017-04-07 RX ADMIN — PIPERACILLIN AND TAZOBACTAM 2.25 G: 2; .25 INJECTION, POWDER, LYOPHILIZED, FOR SOLUTION INTRAVENOUS; PARENTERAL at 07:02

## 2017-04-07 RX ADMIN — OXYCODONE HYDROCHLORIDE 10 MG: 5 TABLET ORAL at 00:51

## 2017-04-07 RX ADMIN — Medication 12.5 MG: at 12:03

## 2017-04-07 RX ADMIN — Medication 3 MG/HR: at 01:43

## 2017-04-07 RX ADMIN — ACETAMINOPHEN 325 MG: 325 TABLET, FILM COATED ORAL at 17:35

## 2017-04-07 RX ADMIN — HYDRALAZINE HYDROCHLORIDE 10 MG: 20 INJECTION INTRAMUSCULAR; INTRAVENOUS at 04:12

## 2017-04-07 RX ADMIN — POTASSIUM CHLORIDE 40 MEQ: 1.5 POWDER, FOR SOLUTION ORAL at 21:31

## 2017-04-07 RX ADMIN — POTASSIUM CHLORIDE 20 MEQ: 29.8 INJECTION, SOLUTION INTRAVENOUS at 04:35

## 2017-04-07 RX ADMIN — CALCIUM GLUCONATE 2 G: 94 INJECTION, SOLUTION INTRAVENOUS at 15:37

## 2017-04-07 RX ADMIN — ASPIRIN 81 MG CHEWABLE TABLET 81 MG: 81 TABLET CHEWABLE at 07:59

## 2017-04-07 RX ADMIN — POTASSIUM CHLORIDE 20 MEQ: 1.5 POWDER, FOR SOLUTION ORAL at 21:43

## 2017-04-07 ASSESSMENT — PAIN DESCRIPTION - DESCRIPTORS: DESCRIPTORS: ACHING

## 2017-04-07 NOTE — PROGRESS NOTES
"Cardiology Progress Note    Events and interval changes in past 24 hours:         Tm 37.1 HR 100s   IABP mean >78 BP cuff   2L 93%    4am  CVP 16 MAP 73  PA 46/32  PCW  SvO2 46<-79-49-  BSA 1.6 Hgb 9.6   CI 1.8  CO 3  SVR 2600  PVR    CXR:  pulm edema, Edwards, enteric tube in stomach  I/O 3/2.8since MN 0.6/0.9    Adm wt 139 wt today 136 yesterday 138    Drips  Dobutamine 5  Bumex 3  Heparin drip  Insulin drip    Meds  Asa 81  Plavix 75  Vanco, zosyn started 4/3 last fever 4/4  PPI            OBJECTIVE FINDINGS:  /66  Temp 97.9  F (36.6  C) (Oral)  Resp 20  Ht 1.575 m (5' 2.01\")  Wt 61.8 kg (136 lb 3.9 oz)  SpO2 96%  BMI 24.91 kg/m2    Gen: alert, following commands  CV: tachy, S1 & S2, S3 holosystolic murmur at apex  Lungs: crackles antriorly  Abd: slightly distended, soft  Ext: DP pulses not palpable but dopplerable per RN, no peripheral edema    Lines  Right arm PICC 3/30  Left radial art line 4/2  Edwards 3/27      Wt Readings from Last 5 Encounters:   04/06/17 61.8 kg (136 lb 3.9 oz)   03/27/17 62 kg (136 lb 11 oz)   07/15/08 68.5 kg (151 lb)     CMP  Recent Labs  Lab 04/07/17  0403 04/07/17  0007 04/06/17  2149 04/06/17  1430 04/06/17  1410  04/06/17  0351  04/05/17  1654  04/05/17  0328  04/04/17  0424  04/03/17  1216   *  --   --   --  155*  --  150*  --  148*  --  151*  < > 146*  < >  --    POTASSIUM 3.1*  3.2* 4.8 3.1* 4.4 4.3  < > 3.4  3.5  < > 3.8  3.8  < > 3.5  < > 3.9  4.1  < > 4.0   CHLORIDE 111*  --   --   --  109  --  105  --  103  --  106  < > 102  < >  --    CO2 38*  --   --   --  33*  --  30  --  31  --  30  < > 34*  < >  --    ANIONGAP 10  --   --   --  13  --  15*  --  14  --  15*  < > 10  < >  --    *  --   --   --  175*  --  128*  --  144*  --  127*  < > 139*  < >  --    *  --   --   --  112*  --  105*  --  92*  --  74*  < > 47*  < >  --    CR 4.26*  --   --   --  5.05*  --  5.10*  --  4.67*  --  3.77*  < > 2.31*  < >  --    GFRESTIMATED 15*  --   --   --  " 12*  --  12*  --  13*  --  17*  < > 30*  < >  --    GFRESTBLACK 18*  --   --   --  15*  --  15*  --  16*  --  21*  < > 36*  < >  --    ROB 7.3*  --   --   --  7.1*  --  7.2*  --  7.3*  --  7.2*  < > 7.8*  < >  --    MAG 3.0*  --  3.1*  --  3.2*  --  3.4*  --  3.3*  --  3.3*  < > 3.0*  < > 2.0   PHOS  --   --  5.2*  --   --   --  7.4*  --   --   --  6.0*  --   --   --  3.0   PROTTOTAL 6.9  --   --   --   --   --  6.6*  --   --   --  6.9  --  6.7*  < >  --    ALBUMIN 2.6*  --   --   --   --   --  2.5*  --   --   --  2.7*  --  2.6*  < >  --    BILITOTAL 1.3  --   --   --   --   --  1.3  --   --   --  1.3  --  1.6*  < >  --    ALKPHOS 95  --   --   --   --   --  77  --   --   --  69  --  79  < >  --    AST 60*  --   --   --   --   --  67*  --   --   --  95*  --  128*  < >  --    *  --   --   --   --   --  396*  --   --   --  531*  --  707*  < >  --    < > = values in this interval not displayed.  CBC    Recent Labs  Lab 04/07/17  0403 04/06/17  0351 04/05/17  0328 04/04/17  0424   WBC 12.8* 12.7* 14.5* 14.9*   RBC 3.24* 2.95* 3.18* 3.11*   HGB 9.9* 8.9* 9.5* 9.6*   HCT 32.6* 29.0* 30.4* 29.6*   * 98 96 95   MCH 30.6 30.2 29.9 30.9   MCHC 30.4* 30.7* 31.3* 32.4   RDW 17.1* 16.8* 16.7* 16.5*    200 175 157     INR    Recent Labs  Lab 04/07/17  0403 04/06/17  0351 04/05/17  0328 04/04/17  0424   INR 1.23* 1.28* 1.24* 1.21*     Arterial Blood Gas  Recent Labs  Lab 04/07/17  0403 04/07/17  0007 04/06/17  2149 04/06/17  1635  04/06/17  0620  04/05/17  0850  04/03/17  0754  04/03/17  0630   PH  --   --   --   --   --  7.46*  --  7.48*  --  7.46*  --  7.46*   PCO2  --   --   --   --   --  45  --  41  --  40  --  43   PO2  --   --   --   --   --  97  --  84  --  95  --  105   HCO3  --   --   --   --   --  32*  --  31*  --  29*  --  30*   O2PER 3L 3L 3LNC 2LNC  < > 2L  < > 3L  < > 40.0  < > 40.0   < > = values in this interval not displayed.      Labs and Imaging  Reviewed in epic  Flu swab negative    Echo  3/27  The visual ejection fraction is estimated at 30-35%.  There is extensive inferior, inferoseptal, and inferolateral wall akinesis.  Basal/mid lateral wall hypokinesis  There is moderate to severe septal hypokinesis.  Septal wall motion abnormality may reflect pacemaker activation.  There is a catheter/pacemaker lead seen in the right ventricle.  The right ventricle is mildly dilated.  Severely decreased right ventricular systolic function  There is no pericardial effusion.  The rhythm was paced.  Compared to the prior study, the LVEF appears somewhat decreased. Septal wall  motion abnormality larger- may reflect, in part, that patient in sinus tach on  prior study, and ventricular paced now. No hemodynamically significant  valvular abnormalities on 2D or color flow imaging    CT chest/abdomen 3/27   IMPRESSION:   1. Small mediastinal hemorrhage, small bilateral pleural effusions  which may be hemorrhagic. Small intraperitoneal hemorrhage. Minimal  pericardial hemorrhage.  2. Minimal pneumoperitoneum in the left paracolic gutter, of unknown  significance. No pneumatosis as clinically questioned.  3. IABP slightly low in position.  4. Bilateral pulmonary dependent consolidations represent compression  atelectasis, however differentials include aspiration.    Cath 3/26 [facility]  SUMMARY:   --Inferior STEMI w/ late presentation. Culprit dictated by complete  heart block, and cardiogenic shock. Emergent echo in the Cath Lab also  shows significant RV dysfunction.  --Three vessel coronary artery disease with diffuse coronary disease  out to the distal vessels  --Successful POBA of the prox and mid-RCA. Stent was not placed, in  anticipation he may need to be considered for bypass surgery in the  near future  --Insertion of a temporary pacemaker for bradycardia (AVB) and  hypotension  --Insertion of a IABP  --Upper GI bleed consistent with Kaylin-Bonilla    [facility] 3/27    CT head 3/28: normal      Echo 3/29  Interpretation Summary  Limited study. Ischemic CM. Moderately (EF 30-35%) reduced left ventricular  function is present. Traced at 32%.  Posterior wall akinesis is present. Lateral wall akinesis is present. Inferior  wall akinesis is present.  Right ventricular function, chamber size, wall motion, and thickness are  normal.  Dilation of the inferior vena cava is present with abnormal respiratory  variation in diameter.     Compared to prior study, RV fxn is improved.    Echo 3/31  Left Ventricle  The Ejection Fraction is estimated at 50-55%. Inferior,posterior,lateral,basal  septal wall akinesis as reported before. No change.    CORONARY ANGIOGRAM 4/1:   1. Both coronary arteries arise from their respective cusps.  2. Right dominant.  3. LM has mild luminal irregularities.   4. LAD supplies the apex along with the RCA (type 2 LAD) and gives rise to septal perforators and a large and branching D1. There are widely patent stents extending from the proximal to mid LAD. The dLAD has mild to moderate disease to 30% stenosis. The D1 is jailed by the LAD stents, but has EBER 3 flow with disease to 30% stenosis.    5. The small ramus is no longer present.  6. LCX is occluded at the ostium.   7. RCA gives rise to PL branches and supplies PDA. There are widely patent stents extending from the proximal to mid RCA. The remainder of the mRCA has disease to 50% stenosis and the dRCA has disease to 10% stenosis. The RPDA has a widely patent stent and the remainder of the artery has mild disease to 10% stenosis. The RPLA has small vessels with diffuse disease including a distal branch occlusion. The RPLA supplies some small collateral branches to the dLCx.     COMPLICATIONS:  1. None     SUMMARY:   1. Cardiogenic shock following an inferior STEMI.  2. The LCx re-occlusion is not surprising as the recently revascularized LCx  had poor outflow and the revascularized portion did not supply a large territory (limited  to no flow was present distal to the stented segment immediately following stenting). Repeat revascularization of the LCx would result in the same outcome of repeat closure and also risk embolizing thrombus from the LCx into the LAD so no treatment of the occluded LCx was performed.   3. Three vessel coronary artery disease with patent LAD and RCA stents and an occluded LCx.    Echo 4/3  Left ventricular size is normal.  The Ejection Fraction is estimated at 35-40%.  Inferior wall akinesis is present.  Lateral wall akinesis is present.  Posterior wall akinesis is present.  Right ventricular function, chamber size, wall motion, and thickness are  normal.  Moderate mitral insufficiency is present.  Moderate tricuspid insufficiency is present.  Right ventricular systolic pressure is 47mmHg above the right atrial pressure.  The inferior vena cava is normal.    Echo 4/5  Moderately (EF 35-40%) reduced left ventricular function is present. Traced at  40%.  Moderate mitral insufficiency is present.  The cause of the mitral insufficiency appears to be restricted posterior  leaflet from posterior MI. No mitral leaflet pathology seen.  Dilation of the inferior vena cava is present with abnormal respiratory  variation in diameter.    CT abd without contrast 4/4  IMPRESSION:   1. No evidence of ischemic bowel, obstruction, or intra-abdominal  infection.  2. Bibasilar patchy pulmonary opacities likely represent combination  of aspiration and atelectasis.  3. Small amount of simple free fluid within the lower abdomen.  4. Small left retroperitoneal hematoma along the iliac vasculature  likely postprocedural.   5. Unchanged size of bilateral pleural effusions, which now consist of  simple fluid.    Plan for today  -Nephrology recs: increase free water 100cc/hr through tube feeding also 100cc/hr D5W  -will decrease Bumex to 1mg/hr  - started hydralazine 12.5mg QID for afterload reduction  -stopped antibiotics given negative  cultures and no fever i nlast 48 hrs  - loose stools likely from laxatives and tube feeds. Will check for c.diff given WBC 12 (WBC improving)    Card  # Cardiogenic shock 4/3- from underlying 3v CAD-has had high SVR and low CI   # Mixed shock initially: Cardiogenic from biventricular failure from inferior STEMI and distributive shock from post-MI SIRS response vs aspiration pneumonia/pneumonitis from possible aspiration during cath at [facility] on 3/26  # Due to inferior wall STEMI. S/P 7 stents to LAD, circ, RCA (angiogram report pending). Now with IABP, temporary pacemaker after 3rd deg heart block in cath lab at [facility] on 3/26   # Small pericardial hemorrhage  - IABP removed 4/2  -temp pacer removed 3/29  - Dual antiplatelet therapy with ASA 81mg and plavix 75mg qday  -Hydralazine, Isordil  -eventually will start statin     # Neuro  -alert, follows commands        Respiratory  #Intubated for airway protection due to mental status and emesis on 3/26, extubated 4/3  #Probable Aspiration PNA/pneumonitis vs MSSA pneumonia  #Small mediastinal hemorrhage       # ID  -fever on 4/3-type 2 MI vs infection (unclear source)  . Sepsis from aspiration pneumonia/pneumonitis vs MSSA pneumonia vs post-MI SIRS response initially on 3/28  - repeat cultures if febrile  - was on Unasyn. Switched to vanco and zosyn on 4/3 for fever and elevated lactate concern for sepsis       # Endocrine  T2DM: HA1C 7.5 yesterday on admission. BG climibing to 170s since admission. Will likely continue to rise with steroids.   - Insulin gtt per nurising protocol with hypoglycemia precautions.      # GI  -possible ileus vs constipation-passing gas  -bowel regimen    Minimal pneumoperitoneum, unclear etiology: Seen by General surgery.  -had bloody NG output earlier in hospitalization. Was on PPI BID switched to once daily 4/3   -Conservative Mx for now    Shock liver injury with coagulopathy-improving trend LFTs,   -Vit K 2.5mg 3/28, Vit K  3/29       # Renal/  worsening renal injury- from hypoperfusion from cardiogenic shock 4/5 plus central venous congestion plus contrast nephropathy  -Diuresis  - Daily Cr  - Avoid nephrotoxins as able  - Chavis placed 3/26    -Hypernatremia- resolved    #Hem  -thrombocytopenia-likely from balloon pump  -Anemia- multifactorial     FEN: feeding tube  Code: FULL  PPx:  PPI 40mg daily, senna PRN  Dispo: pending stability    Will staff with Dr. Talon Park  General Cardiology fellow, PGY4  Pager [phone]    I have reviewed today's vital signs, notes, medications, labs and imaging.  I have also seen and examined the patient and agree with the findings and plan as outlined above.  Pt with IABP in place and with cardiogenic shock.  100/66 with lungs clear and tachy S1 and S2 with HSM at apex.  Abd is benign.  Labs with Na 160 and Cr 3.35.  Assessment: Pt with ischemic cardiomyopathy and LV dysfn with cardiogenic shock requiring IABP support now with PEDRO and hypernatremia.  Plan to provide free water to address hypernatremia.  Will need to monitor closely.  Pt seen X3 today for total critical care time 45 min.     Vikram Jean-Baptiste MD, PhD  Professor, Heart Failure and Cardiac Transplantation  [facility]

## 2017-04-07 NOTE — PROGRESS NOTES
Care Coordinator Progress Note     Admission Date/Time:  3/27/2017  Attending MD:  Poornima Hanley, *     Data  Chart reviewed, discussed with interdisciplinary team.   Patient transferred from [facility] with cardiogenic shock    Assessment  Pt IABP placed back on 4/5.  Visited pt dtr for support.  Pt was out of the room for procedure at time of my visit.  Pt dtr stated the team have been updating them well about the plan of care.     Plan  Anticipated Discharge Date:  TBD.  Anticipated Discharge Plan:   TBD.  CC will cont to follow plan of care.      Luis Antonio Woods RN, BSN  4A and 4E/ ICU  Care Coordinator  Phone: [phone]  Pager: [phone]

## 2017-04-07 NOTE — PLAN OF CARE
Problem: Goal Outcome Summary  Goal: Goal Outcome Summary  OT 4E: Pt more alert and interactive during today's session. Pt oriented to person and place however not time. Pt answering questions with 75% accuracy and following one-step commands with demonstration 75%. Pt engaged in BUE and LLE AAROM to progress strength for ADLs and mobility. Pt endorsed LLE sciatic nerve pain and decreased sensation - avoid position changes on L hip to prevent further nerve compression if possible. Pt endorsed relief following LLE stretching.      Rec TCU when medically stable to address cognitive deficits, weakness, and balance deficits leading to decreased ADL I.

## 2017-04-07 NOTE — PLAN OF CARE
Problem: Goal Outcome Summary  Goal: Goal Outcome Summary  Outcome: No Change  Patient remains on IABP at 1:1, pressures 80s / 50s with MAPs 70 - 80. SR - ST 90 - 100s. 3LNC. Lungs clear, diminished with oxygen saturations 95 - 100 percent. 2/2 pulses. Dobutamine at 5 mcg/kg/min. Heparin at 600 units/hr. Insulin gtt at 2.5 units/hr. D5W at 200 ml/hr for Sodiums 155 - 160. Increased free water flushes to 100 ml/hr. TF at 40 ml/hr, Nepro.  Began LVAD workup. Head CT and Ultrasounds complete except Carotids. [place] at 53. CO 3.6 - 4.6. CI 2.2 - 2.6. SVR 1300 - 1800. PAPs 40s / 20s. CVP 13 - 18. Urine output between 100 - 350 ml/hr.  Plans for LVAD Coordinator to speak to patient and family with  on Monday. Will continue with plan of care.

## 2017-04-07 NOTE — PROGRESS NOTES
Checked VBG and gave calculated donna to resident. No changes needed at this time. Bumex, dobutamine gtt running at same rate. Checked lytes and replaced K as needed. Pt can be restless at times. Has a hard time keeping right leg straight. May be becoming more confused. States he has lower extremity pain but can not explain any further. Pt is having loose stools. Will continue to monitor. Discussed with MDs that BP has been climbing the last few hours. Order for hydralazine placed. Will continue to monitor.

## 2017-04-07 NOTE — PLAN OF CARE
Problem: Goal Outcome Summary  Goal: Goal Outcome Summary  SLP: Dysphagia therapy on hold.  Pt limited by positioning related to balloon pump placement.  Pt currently NPO with recommendation for minimal ice chips for comfort.  Upon discussion with MD team, MD team ok with plan given increased aspiration risk due to poor position as well as FT as primary source of nutrition/hydration/medication.  ST to follow up on Monday, 4/10 as medically appropriate.

## 2017-04-07 NOTE — PROGRESS NOTES
Progress Note  No plan for emergent VAD placement. Will continue to monitor for myocardium recovery at this time. With likelihood of needing VAD, placed VAD clearance order set to begin evaluation.     Discussed with Dr. Madhu Edwards MD (PGY-3)  General Surgery  Pager #[phone]

## 2017-04-07 NOTE — PHARMACY-VANCOMYCIN DOSING SERVICE
Pharmacy Vancomycin Note  Date of Service 2017  Patient's  1965   51 year old, male    Indication: fever, concern for sepsis  Current Vancomycin regimen: intermittent dosing based on levels.    Current estimated CrCl = Estimated Creatinine Clearance: 18.3 mL/min (based on Cr of 4.26).    Creatinine for last 3 days  2017:  3:02 PM Creatinine 2.85 mg/dL  2017:  3:28 AM Creatinine 3.77 mg/dL;  4:54 PM Creatinine 4.67 mg/dL  2017:  3:51 AM Creatinine 5.10 mg/dL;  2:10 PM Creatinine 5.05 mg/dL  2017:  4:03 AM Creatinine 4.26 mg/dL    Recent Vancomycin Levels (past 3 days)  2017:  8:01 PM Vancomycin Level 25.3 mg/L  2017:  4:03 AM Vancomycin Level 14.1 mg/L    Vancomycin IV Administrations (past 72 hours)                   vancomycin (VANCOCIN) 1,250 mg in NaCl 0.9 % 250 mL intermittent infusion (mg) 1,250 mg New Bag 17 1936                Nephrotoxins and other renal medications (Future)    Start     Dose/Rate Route Frequency Ordered Stop    17 0700  DOBUTamine (DOBUTREX) 1,000 mg in D5W 250 mL infusion (adult max conc)      7.5 mcg/kg/min × 62 kg (Dosing Weight)  7 mL/hr  Intravenous CONTINUOUS 17 0700      17 1745  bumetanide (BUMEX) 0.25 mg/mL infusion      3 mg/hr  12 mL/hr  Intravenous CONTINUOUS 17 1732               Contrast Orders - past 72 hours     None          Interpretation of levels and current regimen:  Trough level is  Subtherapeutic; pt is clearing vancomycin adequately.  Has serum creatinine changed > 50% in last 72 hours: No  Urine output:  good urine output (4.5 mL/kg/hr since 0000 today)  Renal Function: Improving     Plan:  1.  Vancomycin therapy discontinued after level was drawn. No further doses or monitoring of vancomycin levels necessary at this time.    Uma Arredondo, PharmD IV Student        .

## 2017-04-07 NOTE — PROGRESS NOTES
Social Work  D: SW received consult order to see pt for lvad psychosocial evaluation.   I: PAULETTE checked in with bedside nurse who reports that the lvad coordinator was scheduling  for Monday. SW called  services. Due to scheduling difficulty the soonest available time was Tuesday 4/11/17 at 8am.   P: PAULETTE is available for any questions pt or family have regarding lvad prior to then if needed.  Pager [phone]

## 2017-04-07 NOTE — PROGRESS NOTES
Nephrology Progress Note  04/07/2017       Mr Henao is a 51 yom with hx of DM2 who presented with STEMI and ended up receiving 7 stents on 3/27 as he had 3-V CAD and was not a good CABG candidate due to targets.  Now with cardiogenic shock, on IABP and had multifactorial PEDRO.  Nephrology consulted for management of PEDRO.      Interval History  Mr Henao's Cr unexpectedly declined this am down to 4.26, and again to 3.48 this afternoon, showing signs of recovery from PEDRO.  Needs large volume of free water to correct hypernatremia, can reduce bumex gtt/use intermittently as he recovers.  Baseline Cr is a bit unknown as he presented with Cr of 2.2, may be an issue for consideration for LVAD but with rapid reduction in Cr we are encouraged he would recover to a reasonable point to consider.      ASSESSMENT AND RECOMMENDATIONS:   PEDRO-Unclear baseline Cr as he has not been seen since 2008 prior to this event, does have DM2 which increases likelihood he had underlying CKD, presented with Cr of 2.2. Imaging did not note any abnormalities, has dawkins and making UOP with diuretics and so no suspicion for obstruction. PEDRO likely multifactorial with known hypoperfusion on 3/30-3/31 due to cardiogenic shock and tubular toxic effect of vanco particularly with kidneys recovering from contrast nephropathy and reduced autoregulation ability.  Considered starting HD yesterday to help with volume but was responding to diuretics, today has had decrease in Cr and continued/increased response to bumex (can likely back off/use intermittently) and so can hold off on RRT.  Has elevated bicarb compensating for hypercapnia.    -Cr improving dramatically since yesterday morning, down from 5.0 to 3.5.  Will follow, will see where Cr settles out, will have implications for possible LVAD.      Volume status-Elevated CVP, peripheral edema present but maintaining net even I/O with aggressive diuretics.  Is on bumex gtt but with recovering renal fx can  "likely back off on this and follow UOP.       Electrolytes/pH-Has been hypokalemic due to diuresis, hypocalcemic, ordered replacement.  Bicarb 41, VBG shows compensated respiratory acidosis, 7.45/57/34/40.       Hypernatremia-Worsened at 159 despite 200/q4 free water yesterday.  Total free water deficit is 4.1L to correct to 143, also with ongoing free water loss in urine likely with post ATN diuresis as well as diuretic caused.  Likely that 1/2 of his 3.8L of UOP yesterday was free water.  Discussed with team, will give 100cc/hr down FT and 100cc/hr of D5 today, will give ~4.8L of free water tonight which will cover UOP losses and partially correct his Na, no need to correct more rapidly although pt is very thirsty.       Cardiogenic Shock-On dobutamine and has IABP for augmentation.      ID-Zosyn 4.5, Vanco on hold, level 25 today.      Recommendations were communicated to primary team via verbal communication.        Jurgen Smith  Clinical Nurse Specialist  [phone]    Review of Systems:   I reviewed the following systems:  Gen:  Thirsty.  On bedrest  GI: No N/V  CV: No CP  Resp: No SOB.    Physical Exam:   I/O last 3 completed shifts:  In: 3285.4 [I.V.:1005.4; NG/GT:1280]  Out: 4525 [Urine:4525]   /66  Temp 98.4  F (36.9  C) (Oral)  Resp 20  Ht 1.575 m (5' 2.01\")  Wt 61.8 kg (136 lb 3.9 oz)  SpO2 98%  BMI 24.91 kg/m2     GENERAL APPEARANCE: Intubated and sedated  EYES: No scleral icterus  NECK: Elevated JVD  Pulmonary: lungs clear to auscultation with equal breath sounds bilaterally, no clubbing or cyanosis  CV: Regular rhythm, normal rate, no rub  - JVP elevated  - Edema ++LE edema  GI: soft, nontender, normal bowel sounds  MS: no evidence of inflammation in joints, no muscle tenderness  : + Chavis  SKIN: no rash, warm, dry  NEURO: Vented and sedated.      Labs:   All labs reviewed by me  Electrolytes/Renal -   Recent Labs   Lab Test  04/07/17   1309  04/07/17   1124  04/07/17   0756  " 04/07/17 0403 04/06/17   2149   04/06/17   1410   04/06/17 0351 04/05/17 0328   NA  158*   --    --   159*   --    --    --   155*   --   150*   < >  151*   POTASSIUM  3.1*  3.4  3.7  3.1*  3.2*   < >  3.1*   < >  4.3   < >  3.4  3.5   < >  3.5   CHLORIDE  110*   --    --   111*   --    --    --   109   --   105   < >  106   CO2  41*   --    --   38*   --    --    --   33*   --   30   < >  30   BUN  106*   --    --   111*   --    --    --   112*   --   105*   < >  74*   CR  3.48*   --    --   4.26*   --    --    --   5.05*   --   5.10*   < >  3.77*   GLC  156*   --    --   110*   --    --    --   175*   --   128*   < >  127*   ROB  7.4*   --    --   7.3*   --    --    --   7.1*   --   7.2*   < >  7.2*   MAG   --    --    --   3.0*   --   3.1*   --   3.2*   --   3.4*   < >  3.3*   PHOS   --    --    --    --    --   5.2*   --    --    --   7.4*   --   6.0*    < > = values in this interval not displayed.       CBC -   Recent Labs   Lab Test  04/07/17 0403 04/06/17 0351 04/05/17 0328   WBC  12.8*  12.7*  14.5*   HGB  9.9*  8.9*  9.5*   PLT  223  200  175       LFTs -   Recent Labs   Lab Test  04/07/17 0403 04/06/17 0351 04/05/17 0328   ALKPHOS  95  77  69   BILITOTAL  1.3  1.3  1.3   ALT  312*  396*  531*   AST  60*  67*  95*   PROTTOTAL  6.9  6.6*  6.9   ALBUMIN  2.6*  2.5*  2.7*       Iron Panel - No lab results found.        Current Medications:    hydrALAZINE  12.5 mg Oral 4x Daily     pneumococcal vaccine  0.5 mL Intramuscular Once     pantoprazole  40 mg Oral or Feeding Tube Daily     polyethylene glycol  17 g Oral Daily     sodium chloride (PF)  3 mL Intracatheter Q8H     multivitamins with minerals  15 mL Per Feeding Tube Daily     senna-docusate  1-2 tablet Oral or NG Tube BID     clopidogrel  75 mg Oral or NG Tube Daily     aspirin  81 mg Oral or Feeding Tube Daily       D5W 100 mL/hr at 04/07/17 1400     HEParin 600 Units/hr (04/07/17 1400)     DOBUTamine 5 mcg/kg/min (04/07/17  1400)     bumetanide 4 mg/hr (04/07/17 1400)     IV fluid REPLACEMENT ONLY Stopped (03/31/17 2200)     - MEDICATION INSTRUCTIONS -       IV fluid REPLACEMENT ONLY       insulin (regular) 3 Units/hr (04/07/17 1400)         I,Bobby Chavis, saw and evaluated this patient with JOSE ELIAS Ralph, 04/07/17, as part of a shared visit.     I personally reviewed major complaints, physical findings, investigations, medications and the overall management plan.        My key findings: PEDRO, likely KATN, now recovering; hypernatremia.  Key management decisions made by me: increase free water intake.

## 2017-04-07 NOTE — PROGRESS NOTES
CLINICAL NUTRITION SERVICES - BRIEF NOTE    Received Provider Order for Heart Failure pre VAD planning    Patient currently receiving TF, Nepro @ goal 40 ml/hr via nasoduodenal FT and remains NPO per SLP rec. Not appropriate for nutrition education regarding low sodium diet/post LVAD diet guidelines at this time. RD will follow per protocol and complete education as needed/approp.       Mariangel Salmon RD, LD (pager [phone])

## 2017-04-08 ENCOUNTER — RESULTS ONLY (OUTPATIENT)
Dept: OTHER | Facility: CLINIC | Age: 52
End: 2017-04-08

## 2017-04-08 ENCOUNTER — APPOINTMENT (OUTPATIENT)
Dept: SPEECH THERAPY | Facility: CLINIC | Age: 52
DRG: 228 | End: 2017-04-08
Attending: INTERNAL MEDICINE
Payer: COMMERCIAL

## 2017-04-08 ENCOUNTER — APPOINTMENT (OUTPATIENT)
Dept: GENERAL RADIOLOGY | Facility: CLINIC | Age: 52
DRG: 228 | End: 2017-04-08
Attending: INTERNAL MEDICINE
Payer: COMMERCIAL

## 2017-04-08 LAB
ABO + RH BLD: NORMAL
ABO + RH BLD: NORMAL
ALBUMIN SERPL-MCNC: 2.4 G/DL (ref 3.4–5)
ALBUMIN UR-MCNC: NEGATIVE MG/DL
ALP SERPL-CCNC: 96 U/L (ref 40–150)
ALT SERPL W P-5'-P-CCNC: 215 U/L (ref 0–70)
ANION GAP SERPL CALCULATED.3IONS-SCNC: 5 MMOL/L (ref 3–14)
ANION GAP SERPL CALCULATED.3IONS-SCNC: 6 MMOL/L (ref 3–14)
ANION GAP SERPL CALCULATED.3IONS-SCNC: 6 MMOL/L (ref 3–14)
ANION GAP SERPL CALCULATED.3IONS-SCNC: 7 MMOL/L (ref 3–14)
APPEARANCE UR: CLEAR
APTT PPP: 47 SEC (ref 22–37)
AST SERPL W P-5'-P-CCNC: 38 U/L (ref 0–45)
BASE EXCESS BLDV CALC-SCNC: 12.4 MMOL/L
BASE EXCESS BLDV CALC-SCNC: 12.5 MMOL/L
BASE EXCESS BLDV CALC-SCNC: 12.7 MMOL/L
BASE EXCESS BLDV CALC-SCNC: 13 MMOL/L
BASE EXCESS BLDV CALC-SCNC: 13.7 MMOL/L
BASE EXCESS BLDV CALC-SCNC: 14.5 MMOL/L
BASE EXCESS BLDV CALC-SCNC: 15.3 MMOL/L
BILIRUB DIRECT SERPL-MCNC: 0.4 MG/DL (ref 0–0.2)
BILIRUB SERPL-MCNC: 0.8 MG/DL (ref 0.2–1.3)
BILIRUB UR QL STRIP: NEGATIVE
BLD GP AB SCN SERPL QL: NORMAL
BLOOD BANK CMNT PATIENT-IMP: NORMAL
BUN SERPL-MCNC: 82 MG/DL (ref 7–30)
BUN SERPL-MCNC: 85 MG/DL (ref 7–30)
BUN SERPL-MCNC: 87 MG/DL (ref 7–30)
BUN SERPL-MCNC: 92 MG/DL (ref 7–30)
CA-I BLD-MCNC: 4.3 MG/DL (ref 4.4–5.2)
CALCIUM SERPL-MCNC: 7 MG/DL (ref 8.5–10.1)
CALCIUM SERPL-MCNC: 7.3 MG/DL (ref 8.5–10.1)
CALCIUM SERPL-MCNC: 7.5 MG/DL (ref 8.5–10.1)
CALCIUM SERPL-MCNC: 7.7 MG/DL (ref 8.5–10.1)
CHLORIDE SERPL-SCNC: 101 MMOL/L (ref 94–109)
CHLORIDE SERPL-SCNC: 102 MMOL/L (ref 94–109)
CHLORIDE SERPL-SCNC: 104 MMOL/L (ref 94–109)
CHLORIDE SERPL-SCNC: 106 MMOL/L (ref 94–109)
CO2 SERPL-SCNC: 37 MMOL/L (ref 20–32)
CO2 SERPL-SCNC: 38 MMOL/L (ref 20–32)
CO2 SERPL-SCNC: 38 MMOL/L (ref 20–32)
CO2 SERPL-SCNC: 39 MMOL/L (ref 20–32)
COLOR UR AUTO: ABNORMAL
CREAT SERPL-MCNC: 2.35 MG/DL (ref 0.66–1.25)
CREAT SERPL-MCNC: 2.41 MG/DL (ref 0.66–1.25)
CREAT SERPL-MCNC: 2.52 MG/DL (ref 0.66–1.25)
CREAT SERPL-MCNC: 2.84 MG/DL (ref 0.66–1.25)
CRP SERPL-MCNC: 18 MG/L (ref 0–8)
ERYTHROCYTE [DISTWIDTH] IN BLOOD BY AUTOMATED COUNT: 16.9 % (ref 10–15)
FERRITIN SERPL-MCNC: 535 NG/ML (ref 26–388)
GFR SERPL CREATININE-BSD FRML MDRD: 24 ML/MIN/1.7M2
GFR SERPL CREATININE-BSD FRML MDRD: 27 ML/MIN/1.7M2
GFR SERPL CREATININE-BSD FRML MDRD: 29 ML/MIN/1.7M2
GFR SERPL CREATININE-BSD FRML MDRD: 30 ML/MIN/1.7M2
GLUCOSE BLDC GLUCOMTR-MCNC: 107 MG/DL (ref 70–99)
GLUCOSE BLDC GLUCOMTR-MCNC: 108 MG/DL (ref 70–99)
GLUCOSE BLDC GLUCOMTR-MCNC: 108 MG/DL (ref 70–99)
GLUCOSE BLDC GLUCOMTR-MCNC: 111 MG/DL (ref 70–99)
GLUCOSE BLDC GLUCOMTR-MCNC: 112 MG/DL (ref 70–99)
GLUCOSE BLDC GLUCOMTR-MCNC: 129 MG/DL (ref 70–99)
GLUCOSE BLDC GLUCOMTR-MCNC: 129 MG/DL (ref 70–99)
GLUCOSE BLDC GLUCOMTR-MCNC: 136 MG/DL (ref 70–99)
GLUCOSE BLDC GLUCOMTR-MCNC: 141 MG/DL (ref 70–99)
GLUCOSE BLDC GLUCOMTR-MCNC: 144 MG/DL (ref 70–99)
GLUCOSE BLDC GLUCOMTR-MCNC: 144 MG/DL (ref 70–99)
GLUCOSE BLDC GLUCOMTR-MCNC: 150 MG/DL (ref 70–99)
GLUCOSE BLDC GLUCOMTR-MCNC: 158 MG/DL (ref 70–99)
GLUCOSE BLDC GLUCOMTR-MCNC: 159 MG/DL (ref 70–99)
GLUCOSE BLDC GLUCOMTR-MCNC: 168 MG/DL (ref 70–99)
GLUCOSE BLDC GLUCOMTR-MCNC: 75 MG/DL (ref 70–99)
GLUCOSE BLDC GLUCOMTR-MCNC: 88 MG/DL (ref 70–99)
GLUCOSE SERPL-MCNC: 108 MG/DL (ref 70–99)
GLUCOSE SERPL-MCNC: 143 MG/DL (ref 70–99)
GLUCOSE SERPL-MCNC: 155 MG/DL (ref 70–99)
GLUCOSE SERPL-MCNC: 85 MG/DL (ref 70–99)
GLUCOSE UR STRIP-MCNC: NEGATIVE MG/DL
HBA1C MFR BLD: 7.1 % (ref 4.3–6)
HCO3 BLDV-SCNC: 38 MMOL/L (ref 21–28)
HCO3 BLDV-SCNC: 39 MMOL/L (ref 21–28)
HCO3 BLDV-SCNC: 39 MMOL/L (ref 21–28)
HCO3 BLDV-SCNC: 40 MMOL/L (ref 21–28)
HCO3 BLDV-SCNC: 40 MMOL/L (ref 21–28)
HCT VFR BLD AUTO: 31.4 % (ref 40–53)
HGB BLD-MCNC: 9.5 G/DL (ref 13.3–17.7)
HGB UR QL STRIP: NEGATIVE
HYALINE CASTS #/AREA URNS LPF: 4 /LPF (ref 0–2)
INR PPP: 1.19 (ref 0.86–1.14)
IRON SATN MFR SERPL: 12 % (ref 15–46)
IRON SERPL-MCNC: 33 UG/DL (ref 35–180)
KETONES UR STRIP-MCNC: NEGATIVE MG/DL
LACTATE BLD-SCNC: 1.1 MMOL/L (ref 0.7–2.1)
LACTATE BLD-SCNC: 1.2 MMOL/L (ref 0.7–2.1)
LDH SERPL L TO P-CCNC: 786 U/L (ref 85–227)
LEUKOCYTE ESTERASE UR QL STRIP: NEGATIVE
LMWH PPP CHRO-ACNC: 0.14 IU/ML
LMWH PPP CHRO-ACNC: 0.32 IU/ML
MAGNESIUM SERPL-MCNC: 2.4 MG/DL (ref 1.6–2.3)
MAGNESIUM SERPL-MCNC: 2.5 MG/DL (ref 1.6–2.3)
MCH RBC QN AUTO: 30.8 PG (ref 26.5–33)
MCHC RBC AUTO-ENTMCNC: 30.3 G/DL (ref 31.5–36.5)
MCV RBC AUTO: 102 FL (ref 78–100)
MUCOUS THREADS #/AREA URNS LPF: PRESENT /LPF
NITRATE UR QL: NEGATIVE
NT-PROBNP SERPL-MCNC: 9984 PG/ML (ref 0–450)
O2/TOTAL GAS SETTING VFR VENT: ABNORMAL %
OXYHGB MFR BLDV: 52 %
OXYHGB MFR BLDV: 53 %
OXYHGB MFR BLDV: 54 %
OXYHGB MFR BLDV: 54 %
OXYHGB MFR BLDV: 55 %
OXYHGB MFR BLDV: 55 %
OXYHGB MFR BLDV: 57 %
PCO2 BLDV: 54 MM HG (ref 40–50)
PCO2 BLDV: 55 MM HG (ref 40–50)
PCO2 BLDV: 55 MM HG (ref 40–50)
PCO2 BLDV: 56 MM HG (ref 40–50)
PCO2 BLDV: 57 MM HG (ref 40–50)
PCO2 BLDV: 60 MM HG (ref 40–50)
PCO2 BLDV: 64 MM HG (ref 40–50)
PH BLDV: 7.39 PH (ref 7.32–7.43)
PH BLDV: 7.41 PH (ref 7.32–7.43)
PH BLDV: 7.44 PH (ref 7.32–7.43)
PH BLDV: 7.44 PH (ref 7.32–7.43)
PH BLDV: 7.46 PH (ref 7.32–7.43)
PH BLDV: 7.47 PH (ref 7.32–7.43)
PH BLDV: 7.47 PH (ref 7.32–7.43)
PH UR STRIP: 5 PH (ref 5–7)
PHOSPHATE SERPL-MCNC: 3.5 MG/DL (ref 2.5–4.5)
PLATELET # BLD AUTO: 201 10E9/L (ref 150–450)
PO2 BLDV: 31 MM HG (ref 25–47)
PO2 BLDV: 31 MM HG (ref 25–47)
PO2 BLDV: 32 MM HG (ref 25–47)
PO2 BLDV: 33 MM HG (ref 25–47)
PO2 BLDV: 34 MM HG (ref 25–47)
POTASSIUM BLD-SCNC: 3.3 MMOL/L (ref 3.4–5.3)
POTASSIUM BLD-SCNC: 3.9 MMOL/L (ref 3.4–5.3)
POTASSIUM BLD-SCNC: 4.1 MMOL/L (ref 3.4–5.3)
POTASSIUM SERPL-SCNC: 3.3 MMOL/L (ref 3.4–5.3)
POTASSIUM SERPL-SCNC: 3.4 MMOL/L (ref 3.4–5.3)
POTASSIUM SERPL-SCNC: 3.7 MMOL/L (ref 3.4–5.3)
POTASSIUM SERPL-SCNC: 4 MMOL/L (ref 3.4–5.3)
PROT SERPL-MCNC: 6.3 G/DL (ref 6.8–8.8)
RBC # BLD AUTO: 3.08 10E12/L (ref 4.4–5.9)
RBC #/AREA URNS AUTO: <1 /HPF (ref 0–2)
SODIUM SERPL-SCNC: 145 MMOL/L (ref 133–144)
SODIUM SERPL-SCNC: 146 MMOL/L (ref 133–144)
SODIUM SERPL-SCNC: 149 MMOL/L (ref 133–144)
SODIUM SERPL-SCNC: 149 MMOL/L (ref 133–144)
SP GR UR STRIP: 1.01 (ref 1–1.03)
SPECIMEN EXP DATE BLD: NORMAL
TIBC SERPL-MCNC: 270 UG/DL (ref 240–430)
TRANSFERRIN SERPL-MCNC: 236 MG/DL (ref 210–360)
TSH SERPL DL<=0.05 MIU/L-ACNC: 1.33 MU/L (ref 0.4–4)
URATE SERPL-MCNC: 8 MG/DL (ref 3.5–7.2)
URN SPEC COLLECT METH UR: ABNORMAL
UROBILINOGEN UR STRIP-MCNC: NORMAL MG/DL (ref 0–2)
VIT B12 SERPL-MCNC: 1163 PG/ML (ref 193–986)
WBC # BLD AUTO: 16.7 10E9/L (ref 4–11)
WBC #/AREA URNS AUTO: 1 /HPF (ref 0–2)

## 2017-04-08 PROCEDURE — 20000004 ZZH R&B ICU UMMC

## 2017-04-08 PROCEDURE — 25000132 ZZH RX MED GY IP 250 OP 250 PS 637: Performed by: HOSPITALIST

## 2017-04-08 PROCEDURE — S0171 BUMETANIDE 0.5 MG: HCPCS | Performed by: INTERNAL MEDICINE

## 2017-04-08 PROCEDURE — 25000128 H RX IP 250 OP 636

## 2017-04-08 PROCEDURE — 85730 THROMBOPLASTIN TIME PARTIAL: CPT | Performed by: INTERNAL MEDICINE

## 2017-04-08 PROCEDURE — 84132 ASSAY OF SERUM POTASSIUM: CPT | Performed by: INTERNAL MEDICINE

## 2017-04-08 PROCEDURE — 99291 CRITICAL CARE FIRST HOUR: CPT | Mod: GC | Performed by: INTERNAL MEDICINE

## 2017-04-08 PROCEDURE — 86140 C-REACTIVE PROTEIN: CPT | Performed by: INTERNAL MEDICINE

## 2017-04-08 PROCEDURE — 25000128 H RX IP 250 OP 636: Performed by: HOSPITALIST

## 2017-04-08 PROCEDURE — 40000048 ZZH STATISTIC DAILY SWAN MONITORING

## 2017-04-08 PROCEDURE — 86833 HLA CLASS II HIGH DEFIN QUAL: CPT | Performed by: INTERNAL MEDICINE

## 2017-04-08 PROCEDURE — 86850 RBC ANTIBODY SCREEN: CPT | Performed by: INTERNAL MEDICINE

## 2017-04-08 PROCEDURE — 80048 BASIC METABOLIC PNL TOTAL CA: CPT | Performed by: INTERNAL MEDICINE

## 2017-04-08 PROCEDURE — 00000401 ZZHCL STATISTIC THROMBIN TIME NC: Performed by: INTERNAL MEDICINE

## 2017-04-08 PROCEDURE — 86901 BLOOD TYPING SEROLOGIC RH(D): CPT | Performed by: INTERNAL MEDICINE

## 2017-04-08 PROCEDURE — 83540 ASSAY OF IRON: CPT | Performed by: INTERNAL MEDICINE

## 2017-04-08 PROCEDURE — 84466 ASSAY OF TRANSFERRIN: CPT | Performed by: INTERNAL MEDICINE

## 2017-04-08 PROCEDURE — 84443 ASSAY THYROID STIM HORMONE: CPT | Performed by: INTERNAL MEDICINE

## 2017-04-08 PROCEDURE — 25000132 ZZH RX MED GY IP 250 OP 250 PS 637: Performed by: INTERNAL MEDICINE

## 2017-04-08 PROCEDURE — 82330 ASSAY OF CALCIUM: CPT | Performed by: INTERNAL MEDICINE

## 2017-04-08 PROCEDURE — 86850 RBC ANTIBODY SCREEN: CPT | Performed by: SURGERY

## 2017-04-08 PROCEDURE — 25000132 ZZH RX MED GY IP 250 OP 250 PS 637

## 2017-04-08 PROCEDURE — 80076 HEPATIC FUNCTION PANEL: CPT | Performed by: INTERNAL MEDICINE

## 2017-04-08 PROCEDURE — 86832 HLA CLASS I HIGH DEFIN QUAL: CPT | Performed by: INTERNAL MEDICINE

## 2017-04-08 PROCEDURE — 82805 BLOOD GASES W/O2 SATURATION: CPT | Performed by: INTERNAL MEDICINE

## 2017-04-08 PROCEDURE — 81370 HLA I & II TYPING LR: CPT | Performed by: INTERNAL MEDICINE

## 2017-04-08 PROCEDURE — 86901 BLOOD TYPING SEROLOGIC RH(D): CPT | Performed by: SURGERY

## 2017-04-08 PROCEDURE — 83605 ASSAY OF LACTIC ACID: CPT | Performed by: INTERNAL MEDICINE

## 2017-04-08 PROCEDURE — 82728 ASSAY OF FERRITIN: CPT | Performed by: INTERNAL MEDICINE

## 2017-04-08 PROCEDURE — 71010 XR CHEST PORT 1 VW: CPT

## 2017-04-08 PROCEDURE — 92526 ORAL FUNCTION THERAPY: CPT | Mod: GN

## 2017-04-08 PROCEDURE — 40000076 ZZH STATISTIC IABP MONITORING

## 2017-04-08 PROCEDURE — 86147 CARDIOLIPIN ANTIBODY EA IG: CPT | Performed by: INTERNAL MEDICINE

## 2017-04-08 PROCEDURE — 40000196 ZZH STATISTIC RAPCV CVP MONITORING

## 2017-04-08 PROCEDURE — 85613 RUSSELL VIPER VENOM DILUTED: CPT | Performed by: INTERNAL MEDICINE

## 2017-04-08 PROCEDURE — 84550 ASSAY OF BLOOD/URIC ACID: CPT | Performed by: INTERNAL MEDICINE

## 2017-04-08 PROCEDURE — 82607 VITAMIN B-12: CPT | Performed by: INTERNAL MEDICINE

## 2017-04-08 PROCEDURE — 25000125 ZZHC RX 250: Performed by: STUDENT IN AN ORGANIZED HEALTH CARE EDUCATION/TRAINING PROGRAM

## 2017-04-08 PROCEDURE — 40000225 ZZH STATISTIC SLP WARD VISIT

## 2017-04-08 PROCEDURE — 25000125 ZZHC RX 250: Performed by: INTERNAL MEDICINE

## 2017-04-08 PROCEDURE — 83880 ASSAY OF NATRIURETIC PEPTIDE: CPT | Performed by: INTERNAL MEDICINE

## 2017-04-08 PROCEDURE — 85520 HEPARIN ASSAY: CPT | Performed by: INTERNAL MEDICINE

## 2017-04-08 PROCEDURE — 83550 IRON BINDING TEST: CPT | Performed by: INTERNAL MEDICINE

## 2017-04-08 PROCEDURE — 00000146 ZZHCL STATISTIC GLUCOSE BY METER IP

## 2017-04-08 PROCEDURE — 85027 COMPLETE CBC AUTOMATED: CPT | Performed by: INTERNAL MEDICINE

## 2017-04-08 PROCEDURE — 84100 ASSAY OF PHOSPHORUS: CPT | Performed by: INTERNAL MEDICINE

## 2017-04-08 PROCEDURE — 25000128 H RX IP 250 OP 636: Performed by: STUDENT IN AN ORGANIZED HEALTH CARE EDUCATION/TRAINING PROGRAM

## 2017-04-08 PROCEDURE — 82610 CYSTATIN C: CPT | Performed by: INTERNAL MEDICINE

## 2017-04-08 PROCEDURE — 83615 LACTATE (LD) (LDH) ENZYME: CPT | Performed by: INTERNAL MEDICINE

## 2017-04-08 PROCEDURE — 81376 HLA II TYPING 1 LOCUS LR: CPT | Performed by: INTERNAL MEDICINE

## 2017-04-08 PROCEDURE — 86900 BLOOD TYPING SEROLOGIC ABO: CPT | Performed by: SURGERY

## 2017-04-08 PROCEDURE — 81001 URINALYSIS AUTO W/SCOPE: CPT | Performed by: SURGERY

## 2017-04-08 PROCEDURE — 25000132 ZZH RX MED GY IP 250 OP 250 PS 637: Performed by: STUDENT IN AN ORGANIZED HEALTH CARE EDUCATION/TRAINING PROGRAM

## 2017-04-08 PROCEDURE — 86900 BLOOD TYPING SEROLOGIC ABO: CPT | Performed by: INTERNAL MEDICINE

## 2017-04-08 PROCEDURE — 85525 HEPARIN NEUTRALIZATION: CPT | Performed by: INTERNAL MEDICINE

## 2017-04-08 PROCEDURE — 83036 HEMOGLOBIN GLYCOSYLATED A1C: CPT | Performed by: INTERNAL MEDICINE

## 2017-04-08 PROCEDURE — 25000125 ZZHC RX 250: Performed by: HOSPITALIST

## 2017-04-08 PROCEDURE — 85610 PROTHROMBIN TIME: CPT | Performed by: INTERNAL MEDICINE

## 2017-04-08 PROCEDURE — 83735 ASSAY OF MAGNESIUM: CPT | Performed by: INTERNAL MEDICINE

## 2017-04-08 RX ORDER — POTASSIUM CHLORIDE 1.5 G/1.58G
20-40 POWDER, FOR SOLUTION ORAL
Status: DISCONTINUED | OUTPATIENT
Start: 2017-04-08 | End: 2017-05-12 | Stop reason: HOSPADM

## 2017-04-08 RX ORDER — POTASSIUM CHLORIDE 750 MG/1
20-40 TABLET, EXTENDED RELEASE ORAL
Status: DISCONTINUED | OUTPATIENT
Start: 2017-04-08 | End: 2017-05-12 | Stop reason: HOSPADM

## 2017-04-08 RX ORDER — POTASSIUM CHLORIDE 29.8 MG/ML
20 INJECTION INTRAVENOUS
Status: DISCONTINUED | OUTPATIENT
Start: 2017-04-08 | End: 2017-05-12 | Stop reason: HOSPADM

## 2017-04-08 RX ORDER — POTASSIUM CHLORIDE 7.45 MG/ML
10 INJECTION INTRAVENOUS
Status: DISCONTINUED | OUTPATIENT
Start: 2017-04-08 | End: 2017-05-12 | Stop reason: HOSPADM

## 2017-04-08 RX ADMIN — Medication 1 MG/HR: at 17:53

## 2017-04-08 RX ADMIN — POTASSIUM CHLORIDE 20 MEQ: 29.8 INJECTION, SOLUTION INTRAVENOUS at 22:54

## 2017-04-08 RX ADMIN — CLOPIDOGREL 75 MG: 75 TABLET, FILM COATED ORAL at 08:05

## 2017-04-08 RX ADMIN — HEPARIN SODIUM 750 UNITS/HR: 10000 INJECTION, SOLUTION INTRAVENOUS at 22:25

## 2017-04-08 RX ADMIN — MULTIVIT AND MINERALS-FERROUS GLUCONATE 9 MG IRON/15 ML ORAL LIQUID 15 ML: at 08:05

## 2017-04-08 RX ADMIN — HYDRALAZINE HYDROCHLORIDE 50 MG: 50 TABLET ORAL at 08:05

## 2017-04-08 RX ADMIN — HUMAN INSULIN 3 UNITS/HR: 100 INJECTION, SOLUTION SUBCUTANEOUS at 08:40

## 2017-04-08 RX ADMIN — OXYCODONE HYDROCHLORIDE 10 MG: 5 TABLET ORAL at 08:05

## 2017-04-08 RX ADMIN — POTASSIUM CHLORIDE 20 MEQ: 1.5 POWDER, FOR SOLUTION ORAL at 10:29

## 2017-04-08 RX ADMIN — DEXTROSE MONOHYDRATE: 50 INJECTION, SOLUTION INTRAVENOUS at 07:39

## 2017-04-08 RX ADMIN — OXYCODONE HYDROCHLORIDE 10 MG: 5 TABLET ORAL at 19:52

## 2017-04-08 RX ADMIN — POTASSIUM CHLORIDE 20 MEQ: 1.5 POWDER, FOR SOLUTION ORAL at 17:24

## 2017-04-08 RX ADMIN — ONDANSETRON 4 MG: 2 INJECTION INTRAMUSCULAR; INTRAVENOUS at 16:30

## 2017-04-08 RX ADMIN — PANTOPRAZOLE SODIUM 40 MG: 40 TABLET, DELAYED RELEASE ORAL at 08:06

## 2017-04-08 RX ADMIN — DOBUTAMINE 5 MCG/KG/MIN: 12.5 INJECTION, SOLUTION, CONCENTRATE INTRAVENOUS at 22:26

## 2017-04-08 RX ADMIN — HYDRALAZINE HYDROCHLORIDE 75 MG: 50 TABLET ORAL at 12:06

## 2017-04-08 RX ADMIN — POTASSIUM CHLORIDE 40 MEQ: 1.5 POWDER, FOR SOLUTION ORAL at 02:07

## 2017-04-08 RX ADMIN — HUMAN INSULIN 3 UNITS/HR: 100 INJECTION, SOLUTION SUBCUTANEOUS at 17:52

## 2017-04-08 RX ADMIN — ACETAMINOPHEN 325 MG: 325 TABLET, FILM COATED ORAL at 16:39

## 2017-04-08 RX ADMIN — OXYCODONE HYDROCHLORIDE 10 MG: 5 TABLET ORAL at 02:08

## 2017-04-08 RX ADMIN — HYDRALAZINE HYDROCHLORIDE 75 MG: 50 TABLET ORAL at 16:30

## 2017-04-08 RX ADMIN — OXYCODONE HYDROCHLORIDE 10 MG: 5 TABLET ORAL at 13:33

## 2017-04-08 RX ADMIN — ASPIRIN 81 MG CHEWABLE TABLET 81 MG: 81 TABLET CHEWABLE at 08:05

## 2017-04-08 RX ADMIN — POTASSIUM CHLORIDE 20 MEQ: 1.5 POWDER, FOR SOLUTION ORAL at 10:38

## 2017-04-08 ASSESSMENT — PAIN DESCRIPTION - DESCRIPTORS
DESCRIPTORS: ACHING
DESCRIPTORS: HEADACHE
DESCRIPTORS: ACHING
DESCRIPTORS: HEADACHE

## 2017-04-08 NOTE — PLAN OF CARE
Problem: Goal Outcome Summary  Goal: Goal Outcome Summary  Outcome: No Change  Neuro: Pt A/O, obeys commands, moves all 4 extremities. Intermittent confusion with dates.   Resp: 3LNC, desats quickly without O2. Infrequent cough, lungs diminished in bases.    Cardiac: ST w/ HR: 100's. MAPs: 70-90's, Hydralazine increased to 75mg QID, MAPs dropped to 60, pt reported feeling dizzy and nauseous. Cards 2 notified, will reassess at 2000 and either hold next hydralazine dose or drop to back to 50mg. IABP in right groin, site c/d/i, dressing changed, 1:1, augmenting 100%. Dobutamine gtt 5mcg/kg/min, heparin gtt 750u/h therapeutic at recheck, Bumex gtt 1mg/h. CVP: 14-16, PA: 38/26, SVO2: 52-57, SVR: 1150. K: 3.3, replaced with 40mEq PO per MD d/t elevated Cr. Recheck: 3.4, replaced per protocol.   : Chavis in place and patent, UOP: 75-150ml/h.    GI: Bowel sounds active, passing gas, no BM this shift. Swallow study completed today, full liquid diet ordered. Insulin gtt 0-4u/h. TF @ 40mL/h, with 50mL flushes q1h.   Skin: Pale, warm, dry, intact.      Plan: Care conference Monday to discuss LVAD with family. Continue to monitor and notify treatment team as needed.

## 2017-04-08 NOTE — PLAN OF CARE
Problem: Goal Outcome Summary  Goal: Goal Outcome Summary  Outcome: No Change  VSS, afebrile, pt less confused tonight, A&Ox3 with some intermittent confusion with a minor language barrier.  Continues on 3L NC with independent coughing, sats % with O2, rapidly desats off supplemental O2, LS clear uppers, diminished lowers. SR/ST 90-100s with no ectopy, MAP 70-90s.  10mg oxy given x2 PRN for generalized pain.  CVP 15-16, PA 42-48/24-30, SvO2 58-53, CO 3.4-3.7, CI 2.1-2.3, SVR 6346-3687.  UOP 75-175ml/hr, no BMs.  D5 gtt decreased to 150ml/hr from 200, FWF decreased to 50ml/hr from 100 for decreased Na level to 151 now 149. Bumex decreased to 1mg/hr, Dobutamine gtt continues at 5mcg/kg/min. Potassium replaced per protocol.  LVAD blood workup sent to lab. Will continue to update MD with any changes in condition per protocol.

## 2017-04-08 NOTE — PLAN OF CARE
Problem: Goal Outcome Summary  Goal: Goal Outcome Summary  ST 4E: Recommend initiate full liquid diet as tolerated. Pt must remain flat due to IABP, but did well in reverse trendelenburg position. Pt to take small bites/sips. ST to follow for PO tolerance and to assist with diet advance as appropriate.

## 2017-04-08 NOTE — PROGRESS NOTES
"[facility]  Cardiology II Service / Advanced Heart Failure  Daily Note  April 8, 2017      Interval History:   No acute events overnight. Sodium decreased and free water was decreased to 150cc/hr    ROS:  The Review of Systems is negative other than noted in the HPI    Pertinent Medications:  I have reviewed this patient's current medications      hydrALAZINE  75 mg Oral 4x Daily     dextrose 5% and 0.9% NaCl         pneumococcal vaccine  0.5 mL Intramuscular Once     pantoprazole  40 mg Oral or Feeding Tube Daily     polyethylene glycol  17 g Oral Daily     sodium chloride (PF)  3 mL Intracatheter Q8H     multivitamins with minerals  15 mL Per Feeding Tube Daily     senna-docusate  1-2 tablet Oral or NG Tube BID     clopidogrel  75 mg Oral or NG Tube Daily     aspirin  81 mg Oral or Feeding Tube Daily     Hemodynamics:  CVP: 15  PA: 42/24  Mean PA: 30,  PCW:25  CO/CI: 3.5/2.1,  SVR: 1588    Examination:  BP 96/49  Temp 98.6  F (37  C) (Oral)  Resp 14  Ht 1.575 m (5' 2.01\")  Wt 61.8 kg (136 lb 3.9 oz)  SpO2 100%  BMI 24.91 kg/m2  GEN: A, cooperative and conversational   NECK: can not assess JVP   RESP: crackles   CV: S1S2S3, holossystolic murmur at the apex  ABD: Soft, nontender to palpation, no HSM, +BS, no guarding  EXT: No peripheral edema, warm/well perfused   NEURO: CN II-XII intact, normal 5/5 strength in all extremitites  SKIN: Normal skin turgor, no rash on limited exam    Data:  CMP  Recent Labs  Lab 04/08/17  0953 04/08/17  0952 04/08/17  0429 04/08/17  0425  04/07/17  2121  04/07/17  1612  04/07/17  0403  04/06/17  2149  04/06/17  0351  04/05/17  0328   NA  --  149*  --  149*  --  151*  --  160*  < > 159*  --   --   < > 150*  < > 151*   POTASSIUM 3.3* 3.3* 4.1 4.0  < > 3.3*  3.3*  < > 3.9  3.9  < > 3.1*  3.2*  < > 3.1*  < > 3.4  3.5  < > 3.5   CHLORIDE  --  104  --  106  --  106  --  114*  < > 111*  --   --   < > 105  < > 106   CO2  --  39*  --  37*  --  " 38*  --  38*  < > 38*  --   --   < > 30  < > 30   ANIONGAP  --  6  --  7  --  7  --  8  < > 10  --   --   < > 15*  < > 15*   GLC  --  143*  --  155*  --  176*  --  115*  < > 110*  --   --   < > 128*  < > 127*   BUN  --  87*  --  92*  --  98*  --  101*  < > 111*  --   --   < > 105*  < > 74*   CR  --  2.52*  --  2.84*  --  3.10*  --  3.35*  < > 4.26*  --   --   < > 5.10*  < > 3.77*   GFRESTIMATED  --  27*  --  24*  --  21*  --  20*  < > 15*  --   --   < > 12*  < > 17*   GFRESTBLACK  --  33*  --  29*  --  26*  --  24*  < > 18*  --   --   < > 15*  < > 21*   ROB  --  7.0*  --  7.3*  --  7.6*  --  7.6*  < > 7.3*  --   --   < > 7.2*  < > 7.2*   MAG  --   --   --  2.5*  --   --   --  2.8*  --  3.0*  --  3.1*  < > 3.4*  < > 3.3*   PHOS  --   --   --  3.5  --   --   --   --   --   --   --  5.2*  --  7.4*  --  6.0*   PROTTOTAL  --   --   --  6.3*  --   --   --   --   --  6.9  --   --   --  6.6*  --  6.9   ALBUMIN  --   --   --  2.4*  --   --   --   --   --  2.6*  --   --   --  2.5*  --  2.7*   BILITOTAL  --   --   --  0.8  --   --   --   --   --  1.3  --   --   --  1.3  --  1.3   ALKPHOS  --   --   --  96  --   --   --   --   --  95  --   --   --  77  --  69   AST  --   --   --  38  --   --   --   --   --  60*  --   --   --  67*  --  95*   ALT  --   --   --  215*  --   --   --   --   --  312*  --   --   --  396*  --  531*   < > = values in this interval not displayed.  CBC  Recent Labs  Lab 04/08/17 0425 04/07/17 0403 04/06/17 0351 04/05/17 0328   WBC 16.7* 12.8* 12.7* 14.5*   RBC 3.08* 3.24* 2.95* 3.18*   HGB 9.5* 9.9* 8.9* 9.5*   HCT 31.4* 32.6* 29.0* 30.4*   * 101* 98 96   MCH 30.8 30.6 30.2 29.9   MCHC 30.3* 30.4* 30.7* 31.3*   RDW 16.9* 17.1* 16.8* 16.7*    223 200 175     INR  Recent Labs  Lab 04/08/17 0425 04/07/17 0403 04/06/17  0351 04/05/17  0328   INR 1.19* 1.23* 1.28* 1.24*     Arterial Blood Gas  Recent Labs  Lab 04/08/17  1130 04/08/17  0756 04/08/17  0429 04/08/17  0016  04/06/17  0620   04/05/17  0850  04/03/17  0754  04/03/17  0630   PH  --   --   --   --   --  7.46*  --  7.48*  --  7.46*  --  7.46*   PCO2  --   --   --   --   --  45  --  41  --  40  --  43   PO2  --   --   --   --   --  97  --  84  --  95  --  105   HCO3  --   --   --   --   --  32*  --  31*  --  29*  --  30*   O2PER 3 L 3L 3L 3L  < > 2L  < > 3L  < > 40.0  < > 40.0   < > = values in this interval not displayed.    Imaging Studies:  Echo 3/27  The visual ejection fraction is estimated at 30-35%.  There is extensive inferior, inferoseptal, and inferolateral wall akinesis.  Basal/mid lateral wall hypokinesis  There is moderate to severe septal hypokinesis.  Septal wall motion abnormality may reflect pacemaker activation.  There is a catheter/pacemaker lead seen in the right ventricle.  The right ventricle is mildly dilated.  Severely decreased right ventricular systolic function  There is no pericardial effusion.  The rhythm was paced.  Compared to the prior study, the LVEF appears somewhat decreased. Septal wall  motion abnormality larger- may reflect, in part, that patient in sinus tach on  prior study, and ventricular paced now. No hemodynamically significant  valvular abnormalities on 2D or color flow imaging     CT chest/abdomen 3/27   IMPRESSION:   1. Small mediastinal hemorrhage, small bilateral pleural effusions  which may be hemorrhagic. Small intraperitoneal hemorrhage. Minimal  pericardial hemorrhage.  2. Minimal pneumoperitoneum in the left paracolic gutter, of unknown  significance. No pneumatosis as clinically questioned.  3. IABP slightly low in position.  4. Bilateral pulmonary dependent consolidations represent compression  atelectasis, however differentials include aspiration.     Cath 3/26 [facility]  SUMMARY:   --Inferior STEMI w/ late presentation. Culprit dictated by complete  heart block, and cardiogenic shock. Emergent echo in the Cath Lab also  shows significant RV dysfunction.  --Three vessel  coronary artery disease with diffuse coronary disease  out to the distal vessels  --Successful POBA of the prox and mid-RCA. Stent was not placed, in  anticipation he may need to be considered for bypass surgery in the  near future  --Insertion of a temporary pacemaker for bradycardia (AVB) and  hypotension  --Insertion of a IABP  --Upper GI bleed consistent with Kaylin-Bonilla     Cath [place] 3/27     CT head 3/28: normal      Echo 3/29  Interpretation Summary  Limited study. Ischemic CM. Moderately (EF 30-35%) reduced left ventricular  function is present. Traced at 32%.  Posterior wall akinesis is present. Lateral wall akinesis is present. Inferior  wall akinesis is present.  Right ventricular function, chamber size, wall motion, and thickness are  normal.  Dilation of the inferior vena cava is present with abnormal respiratory  variation in diameter.     Compared to prior study, RV fxn is improved.     Echo 3/31  Left Ventricle  The Ejection Fraction is estimated at 50-55%. Inferior,posterior,lateral,basal  septal wall akinesis as reported before. No change.     CORONARY ANGIOGRAM 4/1:   1. Both coronary arteries arise from their respective cusps.  2. Right dominant.  3. LM has mild luminal irregularities.   4. LAD supplies the apex along with the RCA (type 2 LAD) and gives rise to septal perforators and a large and branching D1. There are widely patent stents extending from the proximal to mid LAD. The dLAD has mild to moderate disease to 30% stenosis. The D1 is jailed by the LAD stents, but has EBER 3 flow with disease to 30% stenosis.   5. The small ramus is no longer present.  6. LCX is occluded at the ostium.   7. RCA gives rise to PL branches and supplies PDA. There are widely patent stents extending from the proximal to mid RCA. The remainder of the mRCA has disease to 50% stenosis and the dRCA has disease to 10% stenosis. The RPDA has a widely patent stent and the remainder of the artery has mild  disease to 10% stenosis. The RPLA has small vessels with diffuse disease including a distal branch occlusion. The RPLA supplies some small collateral branches to the dLCx.      COMPLICATIONS:  1. None      SUMMARY:   1. Cardiogenic shock following an inferior STEMI.  2. The LCx re-occlusion is not surprising as the recently revascularized LCx  had poor outflow and the revascularized portion did not supply a large territory (limited to no flow was present distal to the stented segment immediately following stenting). Repeat revascularization of the LCx would result in the same outcome of repeat closure and also risk embolizing thrombus from the LCx into the LAD so no treatment of the occluded LCx was performed.   3. Three vessel coronary artery disease with patent LAD and RCA stents and an occluded LCx.     Echo 4/3  Left ventricular size is normal.  The Ejection Fraction is estimated at 35-40%.  Inferior wall akinesis is present.  Lateral wall akinesis is present.  Posterior wall akinesis is present.  Right ventricular function, chamber size, wall motion, and thickness are  normal.  Moderate mitral insufficiency is present.  Moderate tricuspid insufficiency is present.  Right ventricular systolic pressure is 47mmHg above the right atrial pressure.  The inferior vena cava is normal.     Echo 4/5  Moderately (EF 35-40%) reduced left ventricular function is present. Traced at  40%.  Moderate mitral insufficiency is present.  The cause of the mitral insufficiency appears to be restricted posterior  leaflet from posterior MI. No mitral leaflet pathology seen.  Dilation of the inferior vena cava is present with abnormal respiratory  variation in diameter.     CT abd without contrast 4/4  IMPRESSION:   1. No evidence of ischemic bowel, obstruction, or intra-abdominal  infection.  2. Bibasilar patchy pulmonary opacities likely represent combination  of aspiration and atelectasis.  3. Small amount of simple free fluid  within the lower abdomen.  4. Small left retroperitoneal hematoma along the iliac vasculature  likely postprocedural.   5. Unchanged size of bilateral pleural effusions, which now consist of  simple fluid.    Assessment and Plan  51 year old man presented with cardiogenic shock from inferior STEMI s/p RCA aspiration thrombectomy and POBA on 3/26 at [facility] s/p IABP and initiation of Dopamine, also during procedure, CHB s/p temp pacer, emesis/hematmesis and altered mental status s/p intubation transferred here for consideration of mechanical support on 3/27. Had PCI to RCA, LAD and LCx (on ASA and plavix) started on epinephrine in addition to dopamine, post procedure developed distributive shock picture from infection (aspiration pneumonia? Sputum cx growing staph aureus, on vanco and zosyn) vs post-MI SIRS response. Currently in cardiogenic shock with IABP in situ    Card  # Cardiogenic shock 4/3- from underlying 3v CAD-has had high SVR and low CI   # Mixed shock initially: Cardiogenic from biventricular failure from inferior STEMI and distributive shock from post-MI SIRS response vs aspiration pneumonia/pneumonitis from possible aspiration during cath at [facility] on 3/26  # Due to inferior wall STEMI. S/P 7 stents to LAD, circ, RCA (angiogram report pending). Now with IABP, temporary pacemaker after 3rd deg heart block in cath lab at [facility] on 3/26   # Small pericardial hemorrhage  - IABP in place  -will increase hydralazine to 75mg daily  -add nitrate as needed for afterload reduction      # Neuro  -alert, follows commands         Respiratory  #Intubated for airway protection due to mental status and emesis on 3/26, extubated 4/3  #Probable Aspiration PNA/pneumonitis vs MSSA pneumonia  #Small mediastinal hemorrhage         # ID  -fever on 4/3-type 2 MI vs infection (unclear source)  . Sepsis from aspiration pneumonia/pneumonitis vs MSSA pneumonia vs post-MI SIRS response initially on 3/28  - repeat  cultures if febrile  - Currently off antibiotics. He was on Unasyn. Switched to vanco and zosyn on 4/3 for fever and elevated lactate concern for sepsis         # Endocrine  T2DM: HA1C 7.5 yesterday on admission. BG climibing to 170s since admission. Will likely continue to rise with steroids.   - Insulin gtt per nurising protocol with hypoglycemia precautions.       # GI  -possible ileus vs constipation-passing gas  -bowel regimen     Minimal pneumoperitoneum, unclear etiology: Seen by General surgery.  -had bloody NG output earlier in hospitalization. Was on PPI BID switched to once daily 4/3   -Conservative Mx for now     Shock liver injury with coagulopathy-improving trend LFTs,   -Vit K 2.5mg 3/28, Vit K 3/29         # Renal/  Renal function slightly improved- from hypoperfusion from cardiogenic shock 4/5 plus central venous congestion plus contrast nephropathy  -Diuresis  - Daily Cr  - Avoid nephrotoxins as able  - Chavis placed 3/26     -Hypernatremia- resolved     #Hem  -Leukocytosis of unknown etiology. No obvious source of infection. Urine clear, no suspicious oral secretions, no skin erythema. Stool neg for CDiff. Blood Cx negative on 4/5  -thrombocytopenia-likely from balloon pump  -Anemia- multifactorial      FEN: feeding tube  Code: FULL  PPx: PPI 40mg daily, senna PRN  Dispo: pending stability        Plan of care discussed with Dr. Estiven Farah MD  Cardiology Fellow  [phone]        I have reviewed today's vital signs, notes, medications, labs and imaging.  I have also seen and examined the patient and agree with the findings and plan as outlined above.  Pt sleeping.  VSS with /60 CI 2.2 and CVP 14.  Lungs clear and nl S1 and S2 with loud IV/VI HSM at apex.  IABP in right groin.  Labs with improving Cr and WBC 16.2.  Assessment: Pt with cardiogenic shock requiring IABP and vasodilator therpay.  Renal fn improving.  Leukocytosis without evidence of infeciton.  Plan for  probable LVAD later next week.  Pt seen X2 with total critical care time 30 min.     Vikram Jean-Baptiste MD, PhD  Professor, Heart Failure and Cardiac Transplantation  [facility]

## 2017-04-08 NOTE — PROGRESS NOTES
Nephrology Progress Note  04/08/2017       Mr Henao is a 51 yom with hx of DM2 who presented with STEMI and ended up receiving 7 stents on 3/27 as he had 3-V CAD and was not a good CABG candidate due to targets.  Now with cardiogenic shock, on IABP and had multifactorial PEDRO.  Nephrology consulted for management of PEDRO.      Interval History  Creatinine continues to improve    ASSESSMENT AND RECOMMENDATIONS:   PEDRO-Unclear baseline Cr as he has not been seen since 2008 prior to this event, does have DM2 which increases likelihood he had underlying CKD, presented with Cr of 2.2. Imaging did not note any abnormalities, has dawkins and making UOP with diuretics and so no suspicion for obstruction. PEDRO likely multifactorial with known hypoperfusion on 3/30-3/31 due to cardiogenic shock and tubular toxic effect of vanco particularly with kidneys recovering from contrast nephropathy and reduced autoregulation ability.  Considered starting HD yesterday to help with volume but was responding to diuretics, today has had decrease in Cr and continued/increased response to bumex (can likely back off/use intermittently) and so can hold off on RRT.  Has elevated bicarb compensating for hypercapnia.    -Cr improving dramatically.  Will follow, will see where Cr settles out, will have implications for possible LVAD.      Volume status-Elevated CVP, peripheral edema present but maintaining net even I/O with aggressive diuretics.  Is on bumex gtt, can decrease rate as renal function improves.  Check weights.     Electrolytes/pH-Has been hypokalemic due to diuresis, hypocalcemic,  -- Needs further repletion of potassium today     Hypernatremia-Improving from 159->149 with d5w gtt.  Patient cleared for fluid intake today.  Recommend checking later tonight to ensure not over correcting with ongoing d5w gtt.     Cardiogenic Shock-On dobutamine and has IABP for augmentation.      ID-Zosyn 4.5, Vanco.      Recommendations were communicated to  "primary team via verbal communication.      Review of Systems:   I reviewed the following systems:  Gen:  Thirsty.  On bedrest  GI: No N/V  CV: No CP  Resp: No SOB.    Physical Exam:   I/O last 3 completed shifts:  In: 6206.78 [I.V.:3971.78; NG/GT:1435]  Out: 3610 [Urine:3610]   /62  Temp 98.6  F (37  C) (Oral)  Resp 18  Ht 1.575 m (5' 2.01\")  Wt 61.8 kg (136 lb 3.9 oz)  SpO2 100%  BMI 24.91 kg/m2     GENERAL APPEARANCE: Intubated and sedated  EYES: No scleral icterus  NECK: Elevated JVD  Pulmonary: lungs clear to auscultation with equal breath sounds bilaterally, no clubbing or cyanosis  CV: Regular rhythm, normal rate, no rub  - JVP elevated  - Edema ++LE edema  GI: soft, nontender, normal bowel sounds  MS: no evidence of inflammation in joints, no muscle tenderness  : + Chavis  SKIN: no rash, warm, dry  NEURO: Vented and sedated.      Labs:   All labs reviewed by me  Electrolytes/Renal -   Recent Labs   Lab Test  04/08/17   0953  04/08/17   0952  04/08/17   0429  04/08/17   0425   04/07/17   2121   04/07/17   1612   04/07/17   0403   04/06/17   2149   04/06/17   0351   NA   --   149*   --   149*   --   151*   --   160*   < >  159*   --    --    < >  150*   POTASSIUM  3.3*  3.3*  4.1  4.0   < >  3.3*  3.3*   < >  3.9  3.9   < >  3.1*  3.2*   < >  3.1*   < >  3.4  3.5   CHLORIDE   --   104   --   106   --   106   --   114*   < >  111*   --    --    < >  105   CO2   --   39*   --   37*   --   38*   --   38*   < >  38*   --    --    < >  30   BUN   --   87*   --   92*   --   98*   --   101*   < >  111*   --    --    < >  105*   CR   --   2.52*   --   2.84*   --   3.10*   --   3.35*   < >  4.26*   --    --    < >  5.10*   GLC   --   143*   --   155*   --   176*   --   115*   < >  110*   --    --    < >  128*   ROB   --   7.0*   --   7.3*   --   7.6*   --   7.6*   < >  7.3*   --    --    < >  7.2*   MAG   --    --    --   2.5*   --    --    --   2.8*   --   3.0*   --   3.1*   < >  3.4*   PHOS   --    " --    --   3.5   --    --    --    --    --    --    --   5.2*   --   7.4*    < > = values in this interval not displayed.     CBC -   Recent Labs   Lab Test  04/08/17 0425 04/07/17 0403 04/06/17 0351   WBC  16.7*  12.8*  12.7*   HGB  9.5*  9.9*  8.9*   PLT  201  223  200     LFTs -   Recent Labs   Lab Test  04/08/17 0425 04/07/17 0403 04/06/17   0351   ALKPHOS  96  95  77   BILITOTAL  0.8  1.3  1.3   ALT  215*  312*  396*   AST  38  60*  67*   PROTTOTAL  6.3*  6.9  6.6*   ALBUMIN  2.4*  2.6*  2.5*     Iron Panel -   Recent Labs   Lab Test  04/08/17 0425   IRON  33*   IRONSAT  12*   MANDA  535*     Current Medications:    hydrALAZINE  75 mg Oral 4x Daily     dextrose 5% and 0.9% NaCl         pneumococcal vaccine  0.5 mL Intramuscular Once     pantoprazole  40 mg Oral or Feeding Tube Daily     polyethylene glycol  17 g Oral Daily     sodium chloride (PF)  3 mL Intracatheter Q8H     multivitamins with minerals  15 mL Per Feeding Tube Daily     senna-docusate  1-2 tablet Oral or NG Tube BID     clopidogrel  75 mg Oral or NG Tube Daily     aspirin  81 mg Oral or Feeding Tube Daily       HEParin 750 Units/hr (04/08/17 1200)     DOBUTamine 5 mcg/kg/min (04/08/17 1200)     bumetanide 1 mg/hr (04/08/17 1200)     IV fluid REPLACEMENT ONLY Stopped (03/31/17 2200)     - MEDICATION INSTRUCTIONS -       IV fluid REPLACEMENT ONLY       insulin (regular) 3.5 Units/hr (04/08/17 1100)

## 2017-04-09 ENCOUNTER — APPOINTMENT (OUTPATIENT)
Dept: GENERAL RADIOLOGY | Facility: CLINIC | Age: 52
DRG: 228 | End: 2017-04-09
Attending: INTERNAL MEDICINE
Payer: COMMERCIAL

## 2017-04-09 LAB
ALBUMIN SERPL-MCNC: 2.4 G/DL (ref 3.4–5)
ALP SERPL-CCNC: 89 U/L (ref 40–150)
ALT SERPL W P-5'-P-CCNC: 169 U/L (ref 0–70)
ANION GAP SERPL CALCULATED.3IONS-SCNC: 6 MMOL/L (ref 3–14)
ANION GAP SERPL CALCULATED.3IONS-SCNC: 6 MMOL/L (ref 3–14)
ANION GAP SERPL CALCULATED.3IONS-SCNC: 8 MMOL/L (ref 3–14)
AST SERPL W P-5'-P-CCNC: 47 U/L (ref 0–45)
BACTERIA SPEC CULT: NO GROWTH
BASE EXCESS BLDV CALC-SCNC: 14.3 MMOL/L
BASE EXCESS BLDV CALC-SCNC: 14.4 MMOL/L
BASE EXCESS BLDV CALC-SCNC: 14.5 MMOL/L
BASE EXCESS BLDV CALC-SCNC: 15.7 MMOL/L
BASE EXCESS BLDV CALC-SCNC: 16.2 MMOL/L
BILIRUB DIRECT SERPL-MCNC: 0.3 MG/DL (ref 0–0.2)
BILIRUB SERPL-MCNC: 0.8 MG/DL (ref 0.2–1.3)
BUN SERPL-MCNC: 75 MG/DL (ref 7–30)
BUN SERPL-MCNC: 79 MG/DL (ref 7–30)
BUN SERPL-MCNC: 83 MG/DL (ref 7–30)
CA-I BLD-MCNC: 4 MG/DL (ref 4.4–5.2)
CA-I BLD-MCNC: 4.2 MG/DL (ref 4.4–5.2)
CALCIUM SERPL-MCNC: 7.4 MG/DL (ref 8.5–10.1)
CALCIUM SERPL-MCNC: 7.5 MG/DL (ref 8.5–10.1)
CALCIUM SERPL-MCNC: 7.5 MG/DL (ref 8.5–10.1)
CHLORIDE SERPL-SCNC: 100 MMOL/L (ref 94–109)
CHLORIDE SERPL-SCNC: 100 MMOL/L (ref 94–109)
CHLORIDE SERPL-SCNC: 102 MMOL/L (ref 94–109)
CO2 SERPL-SCNC: 37 MMOL/L (ref 20–32)
CO2 SERPL-SCNC: 38 MMOL/L (ref 20–32)
CO2 SERPL-SCNC: 39 MMOL/L (ref 20–32)
CREAT SERPL-MCNC: 2.01 MG/DL (ref 0.66–1.25)
CREAT SERPL-MCNC: 2.14 MG/DL (ref 0.66–1.25)
CREAT SERPL-MCNC: 2.23 MG/DL (ref 0.66–1.25)
ERYTHROCYTE [DISTWIDTH] IN BLOOD BY AUTOMATED COUNT: 17.7 % (ref 10–15)
GFR SERPL CREATININE-BSD FRML MDRD: 31 ML/MIN/1.7M2
GFR SERPL CREATININE-BSD FRML MDRD: 33 ML/MIN/1.7M2
GFR SERPL CREATININE-BSD FRML MDRD: 35 ML/MIN/1.7M2
GLUCOSE BLDC GLUCOMTR-MCNC: 100 MG/DL (ref 70–99)
GLUCOSE BLDC GLUCOMTR-MCNC: 103 MG/DL (ref 70–99)
GLUCOSE BLDC GLUCOMTR-MCNC: 104 MG/DL (ref 70–99)
GLUCOSE BLDC GLUCOMTR-MCNC: 111 MG/DL (ref 70–99)
GLUCOSE BLDC GLUCOMTR-MCNC: 113 MG/DL (ref 70–99)
GLUCOSE BLDC GLUCOMTR-MCNC: 126 MG/DL (ref 70–99)
GLUCOSE BLDC GLUCOMTR-MCNC: 127 MG/DL (ref 70–99)
GLUCOSE BLDC GLUCOMTR-MCNC: 129 MG/DL (ref 70–99)
GLUCOSE BLDC GLUCOMTR-MCNC: 137 MG/DL (ref 70–99)
GLUCOSE BLDC GLUCOMTR-MCNC: 137 MG/DL (ref 70–99)
GLUCOSE BLDC GLUCOMTR-MCNC: 143 MG/DL (ref 70–99)
GLUCOSE BLDC GLUCOMTR-MCNC: 143 MG/DL (ref 70–99)
GLUCOSE BLDC GLUCOMTR-MCNC: 147 MG/DL (ref 70–99)
GLUCOSE BLDC GLUCOMTR-MCNC: 153 MG/DL (ref 70–99)
GLUCOSE BLDC GLUCOMTR-MCNC: 156 MG/DL (ref 70–99)
GLUCOSE BLDC GLUCOMTR-MCNC: 158 MG/DL (ref 70–99)
GLUCOSE BLDC GLUCOMTR-MCNC: 158 MG/DL (ref 70–99)
GLUCOSE BLDC GLUCOMTR-MCNC: 160 MG/DL (ref 70–99)
GLUCOSE BLDC GLUCOMTR-MCNC: 96 MG/DL (ref 70–99)
GLUCOSE SERPL-MCNC: 144 MG/DL (ref 70–99)
GLUCOSE SERPL-MCNC: 146 MG/DL (ref 70–99)
GLUCOSE SERPL-MCNC: 161 MG/DL (ref 70–99)
HCO3 BLDV-SCNC: 40 MMOL/L (ref 21–28)
HCO3 BLDV-SCNC: 41 MMOL/L (ref 21–28)
HCT VFR BLD AUTO: 30.7 % (ref 40–53)
HGB BLD-MCNC: 9.3 G/DL (ref 13.3–17.7)
INR PPP: 1.19 (ref 0.86–1.14)
LMWH PPP CHRO-ACNC: 0.27 IU/ML
MAGNESIUM SERPL-MCNC: 2.5 MG/DL (ref 1.6–2.3)
MAGNESIUM SERPL-MCNC: 2.5 MG/DL (ref 1.6–2.3)
MCH RBC QN AUTO: 30.8 PG (ref 26.5–33)
MCHC RBC AUTO-ENTMCNC: 30.3 G/DL (ref 31.5–36.5)
MCV RBC AUTO: 102 FL (ref 78–100)
MICRO REPORT STATUS: NORMAL
O2/TOTAL GAS SETTING VFR VENT: ABNORMAL %
OXYHGB MFR BLDV: 49 %
OXYHGB MFR BLDV: 53 %
OXYHGB MFR BLDV: 54 %
OXYHGB MFR BLDV: 54 %
OXYHGB MFR BLDV: 56 %
PCO2 BLDV: 50 MM HG (ref 40–50)
PCO2 BLDV: 55 MM HG (ref 40–50)
PCO2 BLDV: 55 MM HG (ref 40–50)
PCO2 BLDV: 57 MM HG (ref 40–50)
PCO2 BLDV: 58 MM HG (ref 40–50)
PH BLDV: 7.44 PH (ref 7.32–7.43)
PH BLDV: 7.45 PH (ref 7.32–7.43)
PH BLDV: 7.46 PH (ref 7.32–7.43)
PH BLDV: 7.48 PH (ref 7.32–7.43)
PH BLDV: 7.51 PH (ref 7.32–7.43)
PLATELET # BLD AUTO: 180 10E9/L (ref 150–450)
PO2 BLDV: 29 MM HG (ref 25–47)
PO2 BLDV: 31 MM HG (ref 25–47)
PO2 BLDV: 32 MM HG (ref 25–47)
PO2 BLDV: 32 MM HG (ref 25–47)
PO2 BLDV: 34 MM HG (ref 25–47)
POTASSIUM SERPL-SCNC: 3.5 MMOL/L (ref 3.4–5.3)
POTASSIUM SERPL-SCNC: 3.5 MMOL/L (ref 3.4–5.3)
POTASSIUM SERPL-SCNC: 3.7 MMOL/L (ref 3.4–5.3)
PROT SERPL-MCNC: 6.4 G/DL (ref 6.8–8.8)
RBC # BLD AUTO: 3.02 10E12/L (ref 4.4–5.9)
SODIUM SERPL-SCNC: 144 MMOL/L (ref 133–144)
SODIUM SERPL-SCNC: 145 MMOL/L (ref 133–144)
SODIUM SERPL-SCNC: 147 MMOL/L (ref 133–144)
SPECIMEN SOURCE: NORMAL
TROPONIN I SERPL-MCNC: 7.16 UG/L (ref 0–0.04)
WBC # BLD AUTO: 16.2 10E9/L (ref 4–11)

## 2017-04-09 PROCEDURE — 25000125 ZZHC RX 250

## 2017-04-09 PROCEDURE — 25000132 ZZH RX MED GY IP 250 OP 250 PS 637

## 2017-04-09 PROCEDURE — 93005 ELECTROCARDIOGRAM TRACING: CPT

## 2017-04-09 PROCEDURE — 84484 ASSAY OF TROPONIN QUANT: CPT | Performed by: INTERNAL MEDICINE

## 2017-04-09 PROCEDURE — 25000132 ZZH RX MED GY IP 250 OP 250 PS 637: Performed by: STUDENT IN AN ORGANIZED HEALTH CARE EDUCATION/TRAINING PROGRAM

## 2017-04-09 PROCEDURE — 82805 BLOOD GASES W/O2 SATURATION: CPT | Performed by: INTERNAL MEDICINE

## 2017-04-09 PROCEDURE — 25000132 ZZH RX MED GY IP 250 OP 250 PS 637: Performed by: INTERNAL MEDICINE

## 2017-04-09 PROCEDURE — S0171 BUMETANIDE 0.5 MG: HCPCS | Performed by: INTERNAL MEDICINE

## 2017-04-09 PROCEDURE — 25000125 ZZHC RX 250: Performed by: STUDENT IN AN ORGANIZED HEALTH CARE EDUCATION/TRAINING PROGRAM

## 2017-04-09 PROCEDURE — 93010 ELECTROCARDIOGRAM REPORT: CPT | Performed by: INTERNAL MEDICINE

## 2017-04-09 PROCEDURE — 25000125 ZZHC RX 250: Performed by: INTERNAL MEDICINE

## 2017-04-09 PROCEDURE — 40000196 ZZH STATISTIC RAPCV CVP MONITORING

## 2017-04-09 PROCEDURE — 83735 ASSAY OF MAGNESIUM: CPT | Performed by: INTERNAL MEDICINE

## 2017-04-09 PROCEDURE — 80048 BASIC METABOLIC PNL TOTAL CA: CPT | Performed by: INTERNAL MEDICINE

## 2017-04-09 PROCEDURE — 80076 HEPATIC FUNCTION PANEL: CPT | Performed by: INTERNAL MEDICINE

## 2017-04-09 PROCEDURE — 20000004 ZZH R&B ICU UMMC

## 2017-04-09 PROCEDURE — 27210432 ZZH NUTRITION PRODUCT RENAL BASIC LITER

## 2017-04-09 PROCEDURE — 71010 XR CHEST PORT 1 VW: CPT

## 2017-04-09 PROCEDURE — 82330 ASSAY OF CALCIUM: CPT | Performed by: INTERNAL MEDICINE

## 2017-04-09 PROCEDURE — 40000076 ZZH STATISTIC IABP MONITORING

## 2017-04-09 PROCEDURE — 85520 HEPARIN ASSAY: CPT | Performed by: INTERNAL MEDICINE

## 2017-04-09 PROCEDURE — 85027 COMPLETE CBC AUTOMATED: CPT | Performed by: INTERNAL MEDICINE

## 2017-04-09 PROCEDURE — 99291 CRITICAL CARE FIRST HOUR: CPT | Mod: GC | Performed by: INTERNAL MEDICINE

## 2017-04-09 PROCEDURE — 85610 PROTHROMBIN TIME: CPT | Performed by: INTERNAL MEDICINE

## 2017-04-09 PROCEDURE — 00000146 ZZHCL STATISTIC GLUCOSE BY METER IP

## 2017-04-09 PROCEDURE — 40000048 ZZH STATISTIC DAILY SWAN MONITORING

## 2017-04-09 RX ORDER — DOBUTAMINE HCL IN DEXTROSE 5 % 500MG/250
2.5 INTRAVENOUS SOLUTION INTRAVENOUS CONTINUOUS
Status: DISCONTINUED | OUTPATIENT
Start: 2017-04-09 | End: 2017-04-15

## 2017-04-09 RX ORDER — DEXTROSE MONOHYDRATE 50 MG/ML
INJECTION, SOLUTION INTRAVENOUS CONTINUOUS
Status: DISCONTINUED | OUTPATIENT
Start: 2017-04-09 | End: 2017-04-09

## 2017-04-09 RX ADMIN — SENNOSIDES AND DOCUSATE SODIUM 1 TABLET: 8.6; 5 TABLET ORAL at 20:11

## 2017-04-09 RX ADMIN — OXYCODONE HYDROCHLORIDE 10 MG: 5 TABLET ORAL at 02:00

## 2017-04-09 RX ADMIN — HYDRALAZINE HYDROCHLORIDE 50 MG: 50 TABLET ORAL at 08:25

## 2017-04-09 RX ADMIN — HUMAN INSULIN 3 UNITS/HR: 100 INJECTION, SOLUTION SUBCUTANEOUS at 19:18

## 2017-04-09 RX ADMIN — POTASSIUM CHLORIDE 20 MEQ: 1.5 POWDER, FOR SOLUTION ORAL at 14:04

## 2017-04-09 RX ADMIN — MULTIVIT AND MINERALS-FERROUS GLUCONATE 9 MG IRON/15 ML ORAL LIQUID 15 ML: at 08:28

## 2017-04-09 RX ADMIN — PANTOPRAZOLE SODIUM 40 MG: 40 TABLET, DELAYED RELEASE ORAL at 08:28

## 2017-04-09 RX ADMIN — OXYCODONE HYDROCHLORIDE 5 MG: 5 TABLET ORAL at 08:25

## 2017-04-09 RX ADMIN — ASPIRIN 81 MG CHEWABLE TABLET 81 MG: 81 TABLET CHEWABLE at 08:25

## 2017-04-09 RX ADMIN — HYDRALAZINE HYDROCHLORIDE 75 MG: 50 TABLET ORAL at 20:11

## 2017-04-09 RX ADMIN — POTASSIUM CHLORIDE 20 MEQ: 29.8 INJECTION, SOLUTION INTRAVENOUS at 05:02

## 2017-04-09 RX ADMIN — HUMAN INSULIN 4 UNITS/HR: 100 INJECTION, SOLUTION SUBCUTANEOUS at 04:03

## 2017-04-09 RX ADMIN — Medication 1 MG/HR: at 16:27

## 2017-04-09 RX ADMIN — DEXTROSE MONOHYDRATE: 50 INJECTION, SOLUTION INTRAVENOUS at 08:29

## 2017-04-09 RX ADMIN — POTASSIUM CHLORIDE 20 MEQ: 1.5 POWDER, FOR SOLUTION ORAL at 17:55

## 2017-04-09 RX ADMIN — CLOPIDOGREL 75 MG: 75 TABLET, FILM COATED ORAL at 08:24

## 2017-04-09 RX ADMIN — HYDRALAZINE HYDROCHLORIDE 75 MG: 50 TABLET ORAL at 14:04

## 2017-04-09 ASSESSMENT — PAIN DESCRIPTION - DESCRIPTORS
DESCRIPTORS: CONSTANT;DULL
DESCRIPTORS: ACHING

## 2017-04-09 NOTE — PLAN OF CARE
"Problem: Goal Outcome Summary  Goal: Goal Outcome Summary  Outcome: No Change  Neuro-Alert and awake. Answers \"yes\" to all questions asked when family is not at bedside. Turned q 2 hours, remains off of left hip. Perrla.  CVTS-Afebrile. MAP < 60 at start of shift. On call team notified and hydralazine held. As night progressed MAP increased but no prn anti hypertensive's. Team notified of BP and decision made to wait for AM dose. PA 30'S/20'S. CO 3.5, CI 2.1. -1400. Team notified. IABP 1:1, soft restraint to right ankle as patient was consistently found with leg flexed.. Dobutamine drip remains at 5 mcg/kg/min. Heparin drip therapeutic at 750 units/hr.  Resp-PO2 at3 l/nc with good PO2 sat's. Desaturates quickly when patient takes off cannula to scratch nose. Lung sounds clear to diminished. Cough effort poor, non productive.  -Bumex drip remains at 1 mg/hr with clear yellow urine output.  -Tolerating tube feeds at 40 ml/hr. Incontinent of stool x 1.Insulin drip titrated from 1-4 units/hr.  Electrolytes replaced. Continue with plan of care, protect patient from harm at IABP insertion site. Continue to monitor closely and update team as necessary.          "

## 2017-04-09 NOTE — PROGRESS NOTES
"[facility] [place]  Cardiology II Service / Advanced Heart Failure  Daily Note  April 8, 2017      Interval History:   - Overnight became hypotensive and wasn't administered the last dose of hydralazine. Was symptomatic with dizziness  - Na crept up this morning from 145 to 147 when D5W was held  - SCr continues to improve    ROS:  The Review of Systems is negative other than noted in the HPI    Pertinent Medications:  I have reviewed this patient's current medications      hydrALAZINE  75 mg Oral Q6H     pneumococcal vaccine  0.5 mL Intramuscular Once     pantoprazole  40 mg Oral or Feeding Tube Daily     polyethylene glycol  17 g Oral Daily     sodium chloride (PF)  3 mL Intracatheter Q8H     multivitamins with minerals  15 mL Per Feeding Tube Daily     senna-docusate  1-2 tablet Oral or NG Tube BID     clopidogrel  75 mg Oral or NG Tube Daily     aspirin  81 mg Oral or Feeding Tube Daily     Hemodynamics:  CVP: 15  PA: 42/24  Mean PA: 30,  PCW:25  CO/CI: 3.5/2.1,  SVR: 1588    Examination:  /62  Temp 99.2  F (37.3  C) (Oral)  Resp 18  Ht 1.575 m (5' 2.01\")  Wt 59.7 kg (131 lb 9.8 oz)  SpO2 98%  BMI 24.07 kg/m2  GEN: A, cooperative and conversational   NECK: can not assess JVP   RESP: crackles   CV: S1S2S3, holossystolic murmur at the apex  ABD: Soft, nontender to palpation, no HSM, +BS, no guarding  EXT: No peripheral edema, warm/well perfused   NEURO: CN II-XII intact, normal 5/5 strength in all extremitites  SKIN: Normal skin turgor, no rash on limited exam    Data:  CMP  Recent Labs  Lab 04/09/17  0950 04/09/17  0317 04/08/17  2215 04/08/17  1535  04/08/17  0425  04/07/17  1612  04/07/17  0403  04/06/17  2149  04/06/17  0351  04/05/17  0328    147* 145* 146*  < > 149*  < > 160*  < > 159*  --   --   < > 150*  < > 151*   POTASSIUM 3.5 3.7 3.7 3.4  < > 4.0  < > 3.9  3.9  < > 3.1*  3.2*  < > 3.1*  < > 3.4  3.5  < > 3.5   CHLORIDE 100 102 101 102  < > 106  < > " 114*  < > 111*  --   --   < > 105  < > 106   CO2 38* 37* 38* 38*  < > 37*  < > 38*  < > 38*  --   --   < > 30  < > 30   ANIONGAP 6 8 5 6  < > 7  < > 8  < > 10  --   --   < > 15*  < > 15*   * 144* 108* 85  < > 155*  < > 115*  < > 110*  --   --   < > 128*  < > 127*   BUN 79* 83* 85* 82*  < > 92*  < > 101*  < > 111*  --   --   < > 105*  < > 74*   CR 2.14* 2.23* 2.41* 2.35*  < > 2.84*  < > 3.35*  < > 4.26*  --   --   < > 5.10*  < > 3.77*   GFRESTIMATED 33* 31* 29* 30*  < > 24*  < > 20*  < > 15*  --   --   < > 12*  < > 17*   GFRESTBLACK 40* 38* 35* 36*  < > 29*  < > 24*  < > 18*  --   --   < > 15*  < > 21*   ROB 7.5* 7.4* 7.7* 7.5*  < > 7.3*  < > 7.6*  < > 7.3*  --   --   < > 7.2*  < > 7.2*   MAG  --  2.5*  --  2.4*  --  2.5*  --  2.8*  --  3.0*  --  3.1*  < > 3.4*  < > 3.3*   PHOS  --   --   --   --   --  3.5  --   --   --   --   --  5.2*  --  7.4*  --  6.0*   PROTTOTAL  --  6.4*  --   --   --  6.3*  --   --   --  6.9  --   --   --  6.6*  --  6.9   ALBUMIN  --  2.4*  --   --   --  2.4*  --   --   --  2.6*  --   --   --  2.5*  --  2.7*   BILITOTAL  --  0.8  --   --   --  0.8  --   --   --  1.3  --   --   --  1.3  --  1.3   ALKPHOS  --  89  --   --   --  96  --   --   --  95  --   --   --  77  --  69   AST  --  47*  --   --   --  38  --   --   --  60*  --   --   --  67*  --  95*   ALT  --  169*  --   --   --  215*  --   --   --  312*  --   --   --  396*  --  531*   < > = values in this interval not displayed.  CBC    Recent Labs  Lab 04/09/17 0317 04/08/17 0425 04/07/17 0403 04/06/17  0351   WBC 16.2* 16.7* 12.8* 12.7*   RBC 3.02* 3.08* 3.24* 2.95*   HGB 9.3* 9.5* 9.9* 8.9*   HCT 30.7* 31.4* 32.6* 29.0*   * 102* 101* 98   MCH 30.8 30.8 30.6 30.2   MCHC 30.3* 30.3* 30.4* 30.7*   RDW 17.7* 16.9* 17.1* 16.8*    201 223 200     INR    Recent Labs  Lab 04/09/17 0317 04/08/17 0425 04/07/17  0403 04/06/17  0351   INR 1.19* 1.19* 1.23* 1.28*     Arterial Blood Gas  Recent Labs  Lab 04/09/17  1142  04/09/17  0821 04/09/17  0317 04/08/17  2328  04/06/17  0620  04/05/17  0850  04/03/17  0754  04/03/17  0630   PH  --   --   --   --   --  7.46*  --  7.48*  --  7.46*  --  7.46*   PCO2  --   --   --   --   --  45  --  41  --  40  --  43   PO2  --   --   --   --   --  97  --  84  --  95  --  105   HCO3  --   --   --   --   --  32*  --  31*  --  29*  --  30*   O2PER 3 L 3L 3L 3L  < > 2L  < > 3L  < > 40.0  < > 40.0   < > = values in this interval not displayed.    Imaging Studies:  Echo 3/27  The visual ejection fraction is estimated at 30-35%.  There is extensive inferior, inferoseptal, and inferolateral wall akinesis.  Basal/mid lateral wall hypokinesis  There is moderate to severe septal hypokinesis.  Septal wall motion abnormality may reflect pacemaker activation.  There is a catheter/pacemaker lead seen in the right ventricle.  The right ventricle is mildly dilated.  Severely decreased right ventricular systolic function  There is no pericardial effusion.  The rhythm was paced.  Compared to the prior study, the LVEF appears somewhat decreased. Septal wall  motion abnormality larger- may reflect, in part, that patient in sinus tach on  prior study, and ventricular paced now. No hemodynamically significant  valvular abnormalities on 2D or color flow imaging     CT chest/abdomen 3/27   IMPRESSION:   1. Small mediastinal hemorrhage, small bilateral pleural effusions  which may be hemorrhagic. Small intraperitoneal hemorrhage. Minimal  pericardial hemorrhage.  2. Minimal pneumoperitoneum in the left paracolic gutter, of unknown  significance. No pneumatosis as clinically questioned.  3. IABP slightly low in position.  4. Bilateral pulmonary dependent consolidations represent compression  atelectasis, however differentials include aspiration.     Cath 3/26 [facility]  SUMMARY:   --Inferior STEMI w/ late presentation. Culprit dictated by complete  heart block, and cardiogenic shock. Emergent echo in the Cath Lab  also  shows significant RV dysfunction.  --Three vessel coronary artery disease with diffuse coronary disease  out to the distal vessels  --Successful POBA of the prox and mid-RCA. Stent was not placed, in  anticipation he may need to be considered for bypass surgery in the  near future  --Insertion of a temporary pacemaker for bradycardia (AVB) and  hypotension  --Insertion of a IABP  --Upper GI bleed consistent with Kaylin-[place]     Cath [place] 3/27     CT head 3/28: normal      Echo 3/29  Interpretation Summary  Limited study. Ischemic CM. Moderately (EF 30-35%) reduced left ventricular  function is present. Traced at 32%.  Posterior wall akinesis is present. Lateral wall akinesis is present. Inferior  wall akinesis is present.  Right ventricular function, chamber size, wall motion, and thickness are  normal.  Dilation of the inferior vena cava is present with abnormal respiratory  variation in diameter.     Compared to prior study, RV fxn is improved.     Echo 3/31  Left Ventricle  The Ejection Fraction is estimated at 50-55%. Inferior,posterior,lateral,basal  septal wall akinesis as reported before. No change.     CORONARY ANGIOGRAM 4/1:   1. Both coronary arteries arise from their respective cusps.  2. Right dominant.  3. LM has mild luminal irregularities.   4. LAD supplies the apex along with the RCA (type 2 LAD) and gives rise to septal perforators and a large and branching D1. There are widely patent stents extending from the proximal to mid LAD. The dLAD has mild to moderate disease to 30% stenosis. The D1 is jailed by the LAD stents, but has EBER 3 flow with disease to 30% stenosis.   5. The small ramus is no longer present.  6. LCX is occluded at the ostium.   7. RCA gives rise to PL branches and supplies PDA. There are widely patent stents extending from the proximal to mid RCA. The remainder of the mRCA has disease to 50% stenosis and the dRCA has disease to 10% stenosis. The RPDA has a widely  patent stent and the remainder of the artery has mild disease to 10% stenosis. The RPLA has small vessels with diffuse disease including a distal branch occlusion. The RPLA supplies some small collateral branches to the dLCx.      COMPLICATIONS:  1. None      SUMMARY:   1. Cardiogenic shock following an inferior STEMI.  2. The LCx re-occlusion is not surprising as the recently revascularized LCx  had poor outflow and the revascularized portion did not supply a large territory (limited to no flow was present distal to the stented segment immediately following stenting). Repeat revascularization of the LCx would result in the same outcome of repeat closure and also risk embolizing thrombus from the LCx into the LAD so no treatment of the occluded LCx was performed.   3. Three vessel coronary artery disease with patent LAD and RCA stents and an occluded LCx.     Echo 4/3  Left ventricular size is normal.  The Ejection Fraction is estimated at 35-40%.  Inferior wall akinesis is present.  Lateral wall akinesis is present.  Posterior wall akinesis is present.  Right ventricular function, chamber size, wall motion, and thickness are  normal.  Moderate mitral insufficiency is present.  Moderate tricuspid insufficiency is present.  Right ventricular systolic pressure is 47mmHg above the right atrial pressure.  The inferior vena cava is normal.     Echo 4/5  Moderately (EF 35-40%) reduced left ventricular function is present. Traced at  40%.  Moderate mitral insufficiency is present.  The cause of the mitral insufficiency appears to be restricted posterior  leaflet from posterior MI. No mitral leaflet pathology seen.  Dilation of the inferior vena cava is present with abnormal respiratory  variation in diameter.     CT abd without contrast 4/4  IMPRESSION:   1. No evidence of ischemic bowel, obstruction, or intra-abdominal  infection.  2. Bibasilar patchy pulmonary opacities likely represent combination  of aspiration and  atelectasis.  3. Small amount of simple free fluid within the lower abdomen.  4. Small left retroperitoneal hematoma along the iliac vasculature  likely postprocedural.   5. Unchanged size of bilateral pleural effusions, which now consist of  simple fluid.    Assessment and Plan  51 year old man presented with cardiogenic shock from inferior STEMI s/p RCA aspiration thrombectomy and POBA on 3/26 at [facility] s/p IABP and initiation of Dopamine, also during procedure, CHB s/p temp pacer, emesis/hematmesis and altered mental status s/p intubation transferred here for consideration of mechanical support on 3/27. Had PCI to RCA, LAD and LCx (on ASA and plavix) started on epinephrine in addition to dopamine, post procedure developed distributive shock picture from infection (aspiration pneumonia? Sputum cx growing staph aureus, on vanco and zosyn) vs post-MI SIRS response. Currently in cardiogenic shock with IABP in situ    Card  # Cardiogenic shock 4/3- from underlying 3v CAD-has had high SVR and low CI   # Mixed shock initially: Cardiogenic from biventricular failure from inferior STEMI and distributive shock from post-MI SIRS response vs aspiration pneumonia/pneumonitis from possible aspiration during cath at [facility] on 3/26  # Due to inferior wall STEMI. S/P 7 stents to LAD, circ, RCA (angiogram report pending). Now with IABP, temporary pacemaker after 3rd deg heart block in cath lab at [facility] on 3/26   # Small pericardial hemorrhage  - IABP in place  - Hydral 75 mg q6H  - Hold off on isordil for now  - LVAD work up tomorrow given SCr improving      # Neuro  -alert, follows commands         Respiratory  #Intubated for airway protection due to mental status and emesis on 3/26, extubated 4/3  #Probable Aspiration PNA/pneumonitis vs MSSA pneumonia  #Small mediastinal hemorrhage         # ID  - fever on 4/3-type 2 MI vs infection (unclear source)  - Sepsis from aspiration pneumonia/pneumonitis vs MSSA  pneumonia vs post-MI SIRS response initially on 3/28  - repeat cultures if febrile  - Currently off antibiotics. He was on Unasyn. Switched to vanco and zosyn on 4/3 for fever and elevated lactate concern for sepsis         # Endocrine  T2DM: HA1C 7.5 yesterday on admission. BG climibing to 170s since admission. Will likely continue to rise with steroids.   - Insulin gtt per nurising protocol with hypoglycemia precautions.       # GI  - possible ileus vs constipation-passing gas  - bowel regimen     Minimal pneumoperitoneum, unclear etiology: Seen by General surgery.  - had bloody NG output earlier in hospitalization. Was on PPI BID switched to once daily 4/3   - Conservative Mx for now     Shock liver injury with coagulopathy-improving trend LFTs,   -Vit K 2.5mg 3/28, Vit K 3/29         # Renal/  Renal function slightly improved- from hypoperfusion from cardiogenic shock 4/5 plus central venous congestion plus contrast nephropathy  -Diuresis  - Daily Cr  - Avoid nephrotoxins as able  - Chavis placed 3/26     -Hypernatremia  - Stopped his D5W, only getting FWF at 100/hr     #Hem  - Leukocytosis of unknown etiology. No obvious source of infection. Urine clear, no suspicious oral secretions, no skin erythema. Stool neg for CDiff. Blood Cx negative on 4/5  - thrombocytopenia-likely from balloon pump  - Anemia- multifactorial      FEN: feeding tube  Code: FULL  PPx: PPI 40mg daily, senna PRN  Dispo: pending stability        Plan of care discussed with Dr. Estiven Loving MD  Cardiovascular Disease Fellow  Pager: [phone]        I have reviewed today's vital signs, notes, medications, labs and imaging.  I have also seen and examined the patient and agree with the findings and plan as outlined above.  Pt sleeping.  VSS with /63 CI 2.1 and CVP 14.  Lungs clear and nl S1 and S2 with loud IV/VI HSM at apex.  IABP in right groin.  Labs with Cr 2.23 and WBC 16.2.  Assessment: Pt with cardiogenic shock  requiring IABP and vasodilator therpay.  Renal fn improving.  Leukocytosis without evidence of infeciton.  Plan for probable LVAD later this week.  Pt seen X2 with total critical care time 30 min.     Vikram Jean-Baptiste MD, PhD  Professor, Heart Failure and Cardiac Transplantation  [facility]

## 2017-04-10 ENCOUNTER — DOCUMENTATION ONLY (OUTPATIENT)
Dept: CARDIOLOGY | Facility: CLINIC | Age: 52
End: 2017-04-10

## 2017-04-10 ENCOUNTER — APPOINTMENT (OUTPATIENT)
Dept: OCCUPATIONAL THERAPY | Facility: CLINIC | Age: 52
DRG: 228 | End: 2017-04-10
Attending: INTERNAL MEDICINE
Payer: COMMERCIAL

## 2017-04-10 ENCOUNTER — APPOINTMENT (OUTPATIENT)
Dept: GENERAL RADIOLOGY | Facility: CLINIC | Age: 52
DRG: 228 | End: 2017-04-10
Attending: INTERNAL MEDICINE
Payer: COMMERCIAL

## 2017-04-10 ENCOUNTER — APPOINTMENT (OUTPATIENT)
Dept: GENERAL RADIOLOGY | Facility: CLINIC | Age: 52
DRG: 228 | End: 2017-04-10
Payer: COMMERCIAL

## 2017-04-10 LAB
ALBUMIN SERPL-MCNC: 2.5 G/DL (ref 3.4–5)
ALP SERPL-CCNC: 88 U/L (ref 40–150)
ALT SERPL W P-5'-P-CCNC: 129 U/L (ref 0–70)
ANION GAP SERPL CALCULATED.3IONS-SCNC: 6 MMOL/L (ref 3–14)
ANION GAP SERPL CALCULATED.3IONS-SCNC: 6 MMOL/L (ref 3–14)
ANION GAP SERPL CALCULATED.3IONS-SCNC: 7 MMOL/L (ref 3–14)
AST SERPL W P-5'-P-CCNC: 44 U/L (ref 0–45)
BASE EXCESS BLDV CALC-SCNC: 15 MMOL/L
BASE EXCESS BLDV CALC-SCNC: 15.4 MMOL/L
BASE EXCESS BLDV CALC-SCNC: 15.9 MMOL/L
BASE EXCESS BLDV CALC-SCNC: 16.2 MMOL/L
BASE EXCESS BLDV CALC-SCNC: 16.4 MMOL/L
BASE EXCESS BLDV CALC-SCNC: 16.7 MMOL/L
BILIRUB DIRECT SERPL-MCNC: 0.4 MG/DL (ref 0–0.2)
BILIRUB SERPL-MCNC: 0.9 MG/DL (ref 0.2–1.3)
BUN SERPL-MCNC: 65 MG/DL (ref 7–30)
BUN SERPL-MCNC: 70 MG/DL (ref 7–30)
BUN SERPL-MCNC: 72 MG/DL (ref 7–30)
CA-I BLD-MCNC: 4.3 MG/DL (ref 4.4–5.2)
CALCIUM SERPL-MCNC: 7.8 MG/DL (ref 8.5–10.1)
CALCIUM SERPL-MCNC: 7.9 MG/DL (ref 8.5–10.1)
CALCIUM SERPL-MCNC: 8.4 MG/DL (ref 8.5–10.1)
CHLORIDE SERPL-SCNC: 100 MMOL/L (ref 94–109)
CHLORIDE SERPL-SCNC: 97 MMOL/L (ref 94–109)
CHLORIDE SERPL-SCNC: 98 MMOL/L (ref 94–109)
CO2 SERPL-SCNC: 37 MMOL/L (ref 20–32)
CO2 SERPL-SCNC: 38 MMOL/L (ref 20–32)
CO2 SERPL-SCNC: 40 MMOL/L (ref 20–32)
CREAT SERPL-MCNC: 1.68 MG/DL (ref 0.66–1.25)
CREAT SERPL-MCNC: 1.81 MG/DL (ref 0.66–1.25)
CREAT SERPL-MCNC: 1.94 MG/DL (ref 0.66–1.25)
ERYTHROCYTE [DISTWIDTH] IN BLOOD BY AUTOMATED COUNT: 18 % (ref 10–15)
GFR SERPL CREATININE-BSD FRML MDRD: 37 ML/MIN/1.7M2
GFR SERPL CREATININE-BSD FRML MDRD: 40 ML/MIN/1.7M2
GFR SERPL CREATININE-BSD FRML MDRD: 44 ML/MIN/1.7M2
GLUCOSE BLDC GLUCOMTR-MCNC: 112 MG/DL (ref 70–99)
GLUCOSE BLDC GLUCOMTR-MCNC: 113 MG/DL (ref 70–99)
GLUCOSE BLDC GLUCOMTR-MCNC: 118 MG/DL (ref 70–99)
GLUCOSE BLDC GLUCOMTR-MCNC: 120 MG/DL (ref 70–99)
GLUCOSE BLDC GLUCOMTR-MCNC: 127 MG/DL (ref 70–99)
GLUCOSE BLDC GLUCOMTR-MCNC: 127 MG/DL (ref 70–99)
GLUCOSE BLDC GLUCOMTR-MCNC: 129 MG/DL (ref 70–99)
GLUCOSE BLDC GLUCOMTR-MCNC: 134 MG/DL (ref 70–99)
GLUCOSE BLDC GLUCOMTR-MCNC: 136 MG/DL (ref 70–99)
GLUCOSE BLDC GLUCOMTR-MCNC: 138 MG/DL (ref 70–99)
GLUCOSE BLDC GLUCOMTR-MCNC: 144 MG/DL (ref 70–99)
GLUCOSE BLDC GLUCOMTR-MCNC: 146 MG/DL (ref 70–99)
GLUCOSE BLDC GLUCOMTR-MCNC: 164 MG/DL (ref 70–99)
GLUCOSE BLDC GLUCOMTR-MCNC: 166 MG/DL (ref 70–99)
GLUCOSE BLDC GLUCOMTR-MCNC: 166 MG/DL (ref 70–99)
GLUCOSE BLDC GLUCOMTR-MCNC: 175 MG/DL (ref 70–99)
GLUCOSE BLDC GLUCOMTR-MCNC: 188 MG/DL (ref 70–99)
GLUCOSE BLDC GLUCOMTR-MCNC: 189 MG/DL (ref 70–99)
GLUCOSE BLDC GLUCOMTR-MCNC: 67 MG/DL (ref 70–99)
GLUCOSE BLDC GLUCOMTR-MCNC: 95 MG/DL (ref 70–99)
GLUCOSE SERPL-MCNC: 132 MG/DL (ref 70–99)
GLUCOSE SERPL-MCNC: 140 MG/DL (ref 70–99)
GLUCOSE SERPL-MCNC: 157 MG/DL (ref 70–99)
HCO3 BLDV-SCNC: 40 MMOL/L (ref 21–28)
HCO3 BLDV-SCNC: 40 MMOL/L (ref 21–28)
HCO3 BLDV-SCNC: 41 MMOL/L (ref 21–28)
HCT VFR BLD AUTO: 29.5 % (ref 40–53)
HGB BLD-MCNC: 8.7 G/DL (ref 13.3–17.7)
HLA TYPING COMPLETE SOT RECIPIENT: NORMAL
INR PPP: 1.12 (ref 0.86–1.14)
INTERPRETATION ECG - MUSE: NORMAL
LMWH PPP CHRO-ACNC: 0.32 IU/ML
MAGNESIUM SERPL-MCNC: 2.5 MG/DL (ref 1.6–2.3)
MAGNESIUM SERPL-MCNC: 2.6 MG/DL (ref 1.6–2.3)
MAGNESIUM SERPL-MCNC: 2.6 MG/DL (ref 1.6–2.3)
MCH RBC QN AUTO: 29.9 PG (ref 26.5–33)
MCHC RBC AUTO-ENTMCNC: 29.5 G/DL (ref 31.5–36.5)
MCV RBC AUTO: 101 FL (ref 78–100)
O2/TOTAL GAS SETTING VFR VENT: 21 %
O2/TOTAL GAS SETTING VFR VENT: ABNORMAL %
OXYHGB MFR BLDV: 49 %
OXYHGB MFR BLDV: 55 %
OXYHGB MFR BLDV: 55 %
OXYHGB MFR BLDV: 56 %
OXYHGB MFR BLDV: 59 %
OXYHGB MFR BLDV: 63 %
PCO2 BLDV: 50 MM HG (ref 40–50)
PCO2 BLDV: 52 MM HG (ref 40–50)
PCO2 BLDV: 52 MM HG (ref 40–50)
PCO2 BLDV: 54 MM HG (ref 40–50)
PCO2 BLDV: 55 MM HG (ref 40–50)
PCO2 BLDV: 56 MM HG (ref 40–50)
PH BLDV: 7.48 PH (ref 7.32–7.43)
PH BLDV: 7.5 PH (ref 7.32–7.43)
PH BLDV: 7.5 PH (ref 7.32–7.43)
PH BLDV: 7.51 PH (ref 7.32–7.43)
PLATELET # BLD AUTO: 175 10E9/L (ref 150–450)
PO2 BLDV: 30 MM HG (ref 25–47)
PO2 BLDV: 31 MM HG (ref 25–47)
PO2 BLDV: 32 MM HG (ref 25–47)
PO2 BLDV: 33 MM HG (ref 25–47)
PO2 BLDV: 34 MM HG (ref 25–47)
PO2 BLDV: 35 MM HG (ref 25–47)
POTASSIUM BLD-SCNC: 3.2 MMOL/L (ref 3.4–5.3)
POTASSIUM BLD-SCNC: 3.7 MMOL/L (ref 3.4–5.3)
POTASSIUM SERPL-SCNC: 3.1 MMOL/L (ref 3.4–5.3)
POTASSIUM SERPL-SCNC: 3.8 MMOL/L (ref 3.4–5.3)
POTASSIUM SERPL-SCNC: 4.1 MMOL/L (ref 3.4–5.3)
PRA SINGLE ANTIGEN IGG ANTIBODY: NORMAL
PROT SERPL-MCNC: 6.5 G/DL (ref 6.8–8.8)
RBC # BLD AUTO: 2.91 10E12/L (ref 4.4–5.9)
SODIUM SERPL-SCNC: 142 MMOL/L (ref 133–144)
SODIUM SERPL-SCNC: 143 MMOL/L (ref 133–144)
SODIUM SERPL-SCNC: 144 MMOL/L (ref 133–144)
WBC # BLD AUTO: 13.3 10E9/L (ref 4–11)

## 2017-04-10 PROCEDURE — 25000132 ZZH RX MED GY IP 250 OP 250 PS 637

## 2017-04-10 PROCEDURE — 85027 COMPLETE CBC AUTOMATED: CPT | Performed by: INTERNAL MEDICINE

## 2017-04-10 PROCEDURE — 80076 HEPATIC FUNCTION PANEL: CPT | Performed by: INTERNAL MEDICINE

## 2017-04-10 PROCEDURE — 25000128 H RX IP 250 OP 636

## 2017-04-10 PROCEDURE — 80048 BASIC METABOLIC PNL TOTAL CA: CPT | Performed by: INTERNAL MEDICINE

## 2017-04-10 PROCEDURE — 25000125 ZZHC RX 250: Performed by: INTERNAL MEDICINE

## 2017-04-10 PROCEDURE — 40000048 ZZH STATISTIC DAILY SWAN MONITORING

## 2017-04-10 PROCEDURE — 00000146 ZZHCL STATISTIC GLUCOSE BY METER IP

## 2017-04-10 PROCEDURE — 97110 THERAPEUTIC EXERCISES: CPT | Mod: GO

## 2017-04-10 PROCEDURE — 27210432 ZZH NUTRITION PRODUCT RENAL BASIC LITER

## 2017-04-10 PROCEDURE — 20000004 ZZH R&B ICU UMMC

## 2017-04-10 PROCEDURE — 25000128 H RX IP 250 OP 636: Performed by: STUDENT IN AN ORGANIZED HEALTH CARE EDUCATION/TRAINING PROGRAM

## 2017-04-10 PROCEDURE — 25000132 ZZH RX MED GY IP 250 OP 250 PS 637: Performed by: STUDENT IN AN ORGANIZED HEALTH CARE EDUCATION/TRAINING PROGRAM

## 2017-04-10 PROCEDURE — 99291 CRITICAL CARE FIRST HOUR: CPT | Mod: GC | Performed by: INTERNAL MEDICINE

## 2017-04-10 PROCEDURE — 85610 PROTHROMBIN TIME: CPT | Performed by: INTERNAL MEDICINE

## 2017-04-10 PROCEDURE — 25000132 ZZH RX MED GY IP 250 OP 250 PS 637: Performed by: INTERNAL MEDICINE

## 2017-04-10 PROCEDURE — 40000133 ZZH STATISTIC OT WARD VISIT

## 2017-04-10 PROCEDURE — 74000 XR ABDOMEN PORT F1 VW: CPT

## 2017-04-10 PROCEDURE — 40000196 ZZH STATISTIC RAPCV CVP MONITORING

## 2017-04-10 PROCEDURE — 84132 ASSAY OF SERUM POTASSIUM: CPT | Performed by: INTERNAL MEDICINE

## 2017-04-10 PROCEDURE — 97530 THERAPEUTIC ACTIVITIES: CPT | Mod: GO

## 2017-04-10 PROCEDURE — 82330 ASSAY OF CALCIUM: CPT | Performed by: INTERNAL MEDICINE

## 2017-04-10 PROCEDURE — 83735 ASSAY OF MAGNESIUM: CPT | Performed by: INTERNAL MEDICINE

## 2017-04-10 PROCEDURE — 25000128 H RX IP 250 OP 636: Performed by: INTERNAL MEDICINE

## 2017-04-10 PROCEDURE — 71010 XR CHEST PORT 1 VW: CPT

## 2017-04-10 PROCEDURE — 25800025 ZZH RX 258: Performed by: INTERNAL MEDICINE

## 2017-04-10 PROCEDURE — 85520 HEPARIN ASSAY: CPT | Performed by: INTERNAL MEDICINE

## 2017-04-10 PROCEDURE — 25000125 ZZHC RX 250: Performed by: STUDENT IN AN ORGANIZED HEALTH CARE EDUCATION/TRAINING PROGRAM

## 2017-04-10 PROCEDURE — S0171 BUMETANIDE 0.5 MG: HCPCS | Performed by: INTERNAL MEDICINE

## 2017-04-10 PROCEDURE — 82805 BLOOD GASES W/O2 SATURATION: CPT | Performed by: INTERNAL MEDICINE

## 2017-04-10 PROCEDURE — 40000076 ZZH STATISTIC IABP MONITORING

## 2017-04-10 RX ORDER — SODIUM CHLORIDE 9 MG/ML
INJECTION, SOLUTION INTRAVENOUS
Status: COMPLETED
Start: 2017-04-10 | End: 2017-04-10

## 2017-04-10 RX ADMIN — POTASSIUM CHLORIDE 20 MEQ: 29.8 INJECTION, SOLUTION INTRAVENOUS at 10:02

## 2017-04-10 RX ADMIN — DOBUTAMINE 5 MCG/KG/MIN: 12.5 INJECTION, SOLUTION, CONCENTRATE INTRAVENOUS at 17:21

## 2017-04-10 RX ADMIN — HUMAN INSULIN 4 UNITS/HR: 100 INJECTION, SOLUTION SUBCUTANEOUS at 03:12

## 2017-04-10 RX ADMIN — POTASSIUM CHLORIDE 20 MEQ: 29.8 INJECTION, SOLUTION INTRAVENOUS at 12:15

## 2017-04-10 RX ADMIN — HEPARIN SODIUM 750 UNITS/HR: 10000 INJECTION, SOLUTION INTRAVENOUS at 00:59

## 2017-04-10 RX ADMIN — POTASSIUM CHLORIDE 20 MEQ: 29.8 INJECTION, SOLUTION INTRAVENOUS at 08:40

## 2017-04-10 RX ADMIN — Medication 1 MG/HR: at 17:21

## 2017-04-10 RX ADMIN — PANTOPRAZOLE SODIUM 40 MG: 40 TABLET, DELAYED RELEASE ORAL at 08:40

## 2017-04-10 RX ADMIN — POTASSIUM CHLORIDE 20 MEQ: 29.8 INJECTION, SOLUTION INTRAVENOUS at 00:57

## 2017-04-10 RX ADMIN — HYDRALAZINE HYDROCHLORIDE 75 MG: 50 TABLET ORAL at 20:29

## 2017-04-10 RX ADMIN — HYDRALAZINE HYDROCHLORIDE 75 MG: 50 TABLET ORAL at 16:07

## 2017-04-10 RX ADMIN — DEXTROSE MONOHYDRATE 50 ML: 25 INJECTION, SOLUTION INTRAVENOUS at 22:52

## 2017-04-10 RX ADMIN — CLOPIDOGREL 75 MG: 75 TABLET, FILM COATED ORAL at 08:40

## 2017-04-10 RX ADMIN — SODIUM CHLORIDE 500 ML: 9 INJECTION, SOLUTION INTRAVENOUS at 01:01

## 2017-04-10 RX ADMIN — HUMAN INSULIN 2 UNITS/HR: 100 INJECTION, SOLUTION SUBCUTANEOUS at 17:21

## 2017-04-10 RX ADMIN — HYDRALAZINE HYDROCHLORIDE 75 MG: 50 TABLET ORAL at 08:40

## 2017-04-10 RX ADMIN — SENNOSIDES AND DOCUSATE SODIUM 2 TABLET: 8.6; 5 TABLET ORAL at 20:29

## 2017-04-10 RX ADMIN — ASPIRIN 81 MG CHEWABLE TABLET 81 MG: 81 TABLET CHEWABLE at 08:40

## 2017-04-10 RX ADMIN — HYDRALAZINE HYDROCHLORIDE 75 MG: 50 TABLET ORAL at 03:14

## 2017-04-10 RX ADMIN — POTASSIUM CHLORIDE 40 MEQ: 1.5 POWDER, FOR SOLUTION ORAL at 00:57

## 2017-04-10 RX ADMIN — MULTIVIT AND MINERALS-FERROUS GLUCONATE 9 MG IRON/15 ML ORAL LIQUID 15 ML: at 08:40

## 2017-04-10 RX ADMIN — CALCIUM GLUCONATE 1 G: 94 INJECTION, SOLUTION INTRAVENOUS at 04:33

## 2017-04-10 RX ADMIN — HYDROMORPHONE HYDROCHLORIDE 0.5 MG: 1 INJECTION, SOLUTION INTRAMUSCULAR; INTRAVENOUS; SUBCUTANEOUS at 22:03

## 2017-04-10 ASSESSMENT — PAIN DESCRIPTION - DESCRIPTORS: DESCRIPTORS: TENDER

## 2017-04-10 NOTE — PROGRESS NOTES
"[facility]  Cardiology II Service / Advanced Heart Failure  Daily Note        Interval History:   - Has been intermittently confused over the last day or so  - Tolerated Hydral 75 q6  - WBC downtrending  - Na normalized  - SCr continues to improve        Pertinent Medications:  I have reviewed this patient's current medications      hydrALAZINE  75 mg Oral Q6H     pneumococcal vaccine  0.5 mL Intramuscular Once     pantoprazole  40 mg Oral or Feeding Tube Daily     polyethylene glycol  17 g Oral Daily     sodium chloride (PF)  3 mL Intracatheter Q8H     multivitamins with minerals  15 mL Per Feeding Tube Daily     senna-docusate  1-2 tablet Oral or NG Tube BID     clopidogrel  75 mg Oral or NG Tube Daily     aspirin  81 mg Oral or Feeding Tube Daily     Hemodynamics:  CVP: 15  PA: 42/24  Mean PA: 30,  PCW:25  CO/CI: 3.5/2.1,  SVR: 1588    Examination:  /72  Temp 98.5  F (36.9  C) (Oral)  Resp 16  Ht 1.575 m (5' 2.01\")  Wt 62 kg (136 lb 11 oz)  SpO2 100%  BMI 24.99 kg/m2  GEN: A, cooperative and conversational   NECK: can not assess JVP   RESP: crackles   CV: S1S2S3, holossystolic murmur at the apex  ABD: Soft, nontender to palpation, no HSM, +BS, no guarding  EXT: No peripheral edema, warm/well perfused   NEURO: CN II-XII intact, normal 5/5 strength in all extremitites  SKIN: Normal skin turgor, no rash on limited exam    Data:  CMP  Recent Labs  Lab 04/10/17  0810 04/10/17  0301 04/09/17  2300 04/09/17  1612 04/09/17  0950 04/09/17  0317  04/08/17  0425  04/07/17  0403  04/06/17  2149  04/06/17  0351  04/05/17  0328   NA  --  144 143 145* 144 147*  < > 149*  < > 159*  --   --   < > 150*  < > 151*   POTASSIUM 3.2* 4.1 3.1* 3.5 3.5 3.7  < > 4.0  < > 3.1*  3.2*  < > 3.1*  < > 3.4  3.5  < > 3.5   CHLORIDE  --  100 97 100 100 102  < > 106  < > 111*  --   --   < > 105  < > 106   CO2  --  37* 40* 39* 38* 37*  < > 37*  < > 38*  --   --   < > 30  < > 30   ANIONGAP  --  7 " 6 6 6 8  < > 7  < > 10  --   --   < > 15*  < > 15*   GLC  --  157* 140* 146* 161* 144*  < > 155*  < > 110*  --   --   < > 128*  < > 127*   BUN  --  70* 72* 75* 79* 83*  < > 92*  < > 111*  --   --   < > 105*  < > 74*   CR  --  1.81* 1.94* 2.01* 2.14* 2.23*  < > 2.84*  < > 4.26*  --   --   < > 5.10*  < > 3.77*   GFRESTIMATED  --  40* 37* 35* 33* 31*  < > 24*  < > 15*  --   --   < > 12*  < > 17*   GFRESTBLACK  --  48* 45* 43* 40* 38*  < > 29*  < > 18*  --   --   < > 15*  < > 21*   ROB  --  7.8* 7.9* 7.5* 7.5* 7.4*  < > 7.3*  < > 7.3*  --   --   < > 7.2*  < > 7.2*   MAG  --  2.6* 2.6* 2.5*  --  2.5*  < > 2.5*  < > 3.0*  --  3.1*  < > 3.4*  < > 3.3*   PHOS  --   --   --   --   --   --   --  3.5  --   --   --  5.2*  --  7.4*  --  6.0*   PROTTOTAL  --  6.5*  --   --   --  6.4*  --  6.3*  --  6.9  --   --   --  6.6*  --  6.9   ALBUMIN  --  2.5*  --   --   --  2.4*  --  2.4*  --  2.6*  --   --   --  2.5*  --  2.7*   BILITOTAL  --  0.9  --   --   --  0.8  --  0.8  --  1.3  --   --   --  1.3  --  1.3   ALKPHOS  --  88  --   --   --  89  --  96  --  95  --   --   --  77  --  69   AST  --  44  --   --   --  47*  --  38  --  60*  --   --   --  67*  --  95*   ALT  --  129*  --   --   --  169*  --  215*  --  312*  --   --   --  396*  --  531*   < > = values in this interval not displayed.  CBC    Recent Labs  Lab 04/10/17  0301 04/09/17  0317 04/08/17  0425 04/07/17  0403   WBC 13.3* 16.2* 16.7* 12.8*   RBC 2.91* 3.02* 3.08* 3.24*   HGB 8.7* 9.3* 9.5* 9.9*   HCT 29.5* 30.7* 31.4* 32.6*   * 102* 102* 101*   MCH 29.9 30.8 30.8 30.6   MCHC 29.5* 30.3* 30.3* 30.4*   RDW 18.0* 17.7* 16.9* 17.1*    180 201 223     INR    Recent Labs  Lab 04/10/17  0301 04/09/17  0317 04/08/17  0425 04/07/17  0403   INR 1.12 1.19* 1.19* 1.23*     Arterial Blood Gas  Recent Labs  Lab 04/10/17  0810 04/10/17  0301 04/10/17  0005 04/09/17 2003 04/06/17  0620  04/05/17  0850   PH  --   --   --   --   --  7.46*  --  7.48*   PCO2  --   --   --    --   --  45  --  41   PO2  --   --   --   --   --  97  --  84   HCO3  --   --   --   --   --  32*  --  31*   O2PER 21 3L 3L 3L  < > 2L  < > 3L   < > = values in this interval not displayed.    Imaging Studies:  Echo 3/27  The visual ejection fraction is estimated at 30-35%.  There is extensive inferior, inferoseptal, and inferolateral wall akinesis.  Basal/mid lateral wall hypokinesis  There is moderate to severe septal hypokinesis.  Septal wall motion abnormality may reflect pacemaker activation.  There is a catheter/pacemaker lead seen in the right ventricle.  The right ventricle is mildly dilated.  Severely decreased right ventricular systolic function  There is no pericardial effusion.  The rhythm was paced.  Compared to the prior study, the LVEF appears somewhat decreased. Septal wall  motion abnormality larger- may reflect, in part, that patient in sinus tach on  prior study, and ventricular paced now. No hemodynamically significant  valvular abnormalities on 2D or color flow imaging     CT chest/abdomen 3/27   IMPRESSION:   1. Small mediastinal hemorrhage, small bilateral pleural effusions  which may be hemorrhagic. Small intraperitoneal hemorrhage. Minimal  pericardial hemorrhage.  2. Minimal pneumoperitoneum in the left paracolic gutter, of unknown  significance. No pneumatosis as clinically questioned.  3. IABP slightly low in position.  4. Bilateral pulmonary dependent consolidations represent compression  atelectasis, however differentials include aspiration.     Cath 3/26 [facility]  SUMMARY:   --Inferior STEMI w/ late presentation. Culprit dictated by complete  heart block, and cardiogenic shock. Emergent echo in the Cath Lab also  shows significant RV dysfunction.  --Three vessel coronary artery disease with diffuse coronary disease  out to the distal vessels  --Successful POBA of the prox and mid-RCA. Stent was not placed, in  anticipation he may need to be considered for bypass surgery in  the  near future  --Insertion of a temporary pacemaker for bradycardia (AVB) and  hypotension  --Insertion of a IABP  --Upper GI bleed consistent with [place]     [facility] 3/27     CT head 3/28: normal      Echo 3/29  Interpretation Summary  Limited study. Ischemic CM. Moderately (EF 30-35%) reduced left ventricular  function is present. Traced at 32%.  Posterior wall akinesis is present. Lateral wall akinesis is present. Inferior  wall akinesis is present.  Right ventricular function, chamber size, wall motion, and thickness are  normal.  Dilation of the inferior vena cava is present with abnormal respiratory  variation in diameter.     Compared to prior study, RV fxn is improved.     Echo 3/31  Left Ventricle  The Ejection Fraction is estimated at 50-55%. Inferior,posterior,lateral,basal  septal wall akinesis as reported before. No change.     CORONARY ANGIOGRAM 4/1:   1. Both coronary arteries arise from their respective cusps.  2. Right dominant.  3. LM has mild luminal irregularities.   4. LAD supplies the apex along with the RCA (type 2 LAD) and gives rise to septal perforators and a large and branching D1. There are widely patent stents extending from the proximal to mid LAD. The dLAD has mild to moderate disease to 30% stenosis. The D1 is jailed by the LAD stents, but has EBER 3 flow with disease to 30% stenosis.   5. The small ramus is no longer present.  6. LCX is occluded at the ostium.   7. RCA gives rise to PL branches and supplies PDA. There are widely patent stents extending from the proximal to mid RCA. The remainder of the mRCA has disease to 50% stenosis and the dRCA has disease to 10% stenosis. The RPDA has a widely patent stent and the remainder of the artery has mild disease to 10% stenosis. The RPLA has small vessels with diffuse disease including a distal branch occlusion. The RPLA supplies some small collateral branches to the dLCx.      COMPLICATIONS:  1. None      SUMMARY:   1.  Cardiogenic shock following an inferior STEMI.  2. The LCx re-occlusion is not surprising as the recently revascularized LCx  had poor outflow and the revascularized portion did not supply a large territory (limited to no flow was present distal to the stented segment immediately following stenting). Repeat revascularization of the LCx would result in the same outcome of repeat closure and also risk embolizing thrombus from the LCx into the LAD so no treatment of the occluded LCx was performed.   3. Three vessel coronary artery disease with patent LAD and RCA stents and an occluded LCx.     Echo 4/3  Left ventricular size is normal.  The Ejection Fraction is estimated at 35-40%.  Inferior wall akinesis is present.  Lateral wall akinesis is present.  Posterior wall akinesis is present.  Right ventricular function, chamber size, wall motion, and thickness are  normal.  Moderate mitral insufficiency is present.  Moderate tricuspid insufficiency is present.  Right ventricular systolic pressure is 47mmHg above the right atrial pressure.  The inferior vena cava is normal.     Echo 4/5  Moderately (EF 35-40%) reduced left ventricular function is present. Traced at  40%.  Moderate mitral insufficiency is present.  The cause of the mitral insufficiency appears to be restricted posterior  leaflet from posterior MI. No mitral leaflet pathology seen.  Dilation of the inferior vena cava is present with abnormal respiratory  variation in diameter.     CT abd without contrast 4/4  IMPRESSION:   1. No evidence of ischemic bowel, obstruction, or intra-abdominal  infection.  2. Bibasilar patchy pulmonary opacities likely represent combination  of aspiration and atelectasis.  3. Small amount of simple free fluid within the lower abdomen.  4. Small left retroperitoneal hematoma along the iliac vasculature  likely postprocedural.   5. Unchanged size of bilateral pleural effusions, which now consist of  simple fluid.    Assessment and  Plan  51 year old man presented with cardiogenic shock from inferior STEMI s/p RCA aspiration thrombectomy and POBA on 3/26 at [facility] s/p IABP and initiation of Dopamine, also during procedure, CHB s/p temp pacer, emesis/hematmesis and altered mental status s/p intubation transferred here for consideration of mechanical support on 3/27. Had PCI to RCA, LAD and LCx (on ASA and plavix) started on epinephrine in addition to dopamine, post procedure developed distributive shock picture from infection (aspiration pneumonia? Sputum cx growing staph aureus, on vanco and zosyn) vs post-MI SIRS response. Currently in cardiogenic shock with IABP in situ    Card  # Cardiogenic shock 4/3- from underlying 3v CAD-has had high SVR and low CI   # Mixed shock initially: Cardiogenic from biventricular failure from inferior STEMI and distributive shock from post-MI SIRS response vs aspiration pneumonia/pneumonitis from possible aspiration during cath at [facility] on 3/26  # Due to inferior wall STEMI. S/P 7 stents to LAD, circ, RCA (angiogram report pending). Now with IABP, temporary pacemaker after 3rd deg heart block in cath lab at [facility] on 3/26   # Small pericardial hemorrhage  - IABP in place  - Hydral 75 mg q6H, once his SCr improves will consider starting Nipride for a more stable afterload reduction regimen, and give him an IABP wean trial  - Hold off on isordil for now  - LVAD work up given SCr improving      # Neuro  - alert, follows commands  - Intermittently confused, no focal deficits, WBC down trending         Respiratory  #Intubated for airway protection due to mental status and emesis on 3/26, extubated 4/3  #Probable Aspiration PNA/pneumonitis vs MSSA pneumonia  #Small mediastinal hemorrhage         # ID  - fever on 4/3-type 2 MI vs infection (unclear source)  - Sepsis from aspiration pneumonia/pneumonitis vs MSSA pneumonia vs post-MI SIRS response initially on 3/28  - repeat cultures if febrile  -  Currently off antibiotics. He was on Unasyn. Switched to vanco and zosyn (4/3 - 4/7) for fever and elevated lactate concern for sepsis         # Endocrine  T2DM: HA1C 7.5 on admission. BG climibing to 170s since admission. Will likely continue to rise with steroids.   - Insulin gtt per nurising protocol with hypoglycemia precautions.       # GI  - possible ileus vs constipation-passing gas  - bowel regimen     Minimal pneumoperitoneum, unclear etiology: Seen by General surgery.  - had bloody NG output earlier in hospitalization. Was on PPI BID switched to once daily 4/3   - Conservative Mx for now     Shock liver injury with coagulopathy-improving trend LFTs,   -Vit K 2.5mg 3/28, Vit K 3/29         # Renal/  Renal function slightly improved- from hypoperfusion from cardiogenic shock 4/5 plus central venous congestion plus contrast nephropathy  -Diuresis  - Daily Cr  - Avoid nephrotoxins as able  - Chavis placed 3/26     -Hypernatremia  - Stopped his D5W, only getting FWF at 75/hr.  - HyperNa resolved     #Hem  - Leukocytosis of unknown etiology. No obvious source of infection. Urine clear, no suspicious oral secretions, no skin erythema. Stool neg for CDiff. Blood Cx negative on 4/5  - thrombocytopenia-likely from balloon pump  - Anemia- multifactorial      FEN: feeding tube  Code: FULL  PPx: PPI 40mg daily, senna PRN  Dispo: pending stability        Plan of care discussed with Dr. Estiven Loving MD  Cardiovascular Disease Fellow  Pager: [phone]        I have reviewed today's vital signs, notes, medications, labs and imaging.  I have also seen and examined the patient and agree with the findings and plan as outlined above.  Pt alert and responsive.  VSS with  and T 98.6.  CVP 12, CI 2.3 and SVR 1360 on Dbx 5 mcg/kg/min.  4.8 L UO.  Lungs clear and S1 and S2 with loud IV/VI HSM at apex.  Abd is benign.  Labs with Cr 1.8 and WBC 13.3.  Assessment: Pt with IWMI now with LV dysfn and mod-severe MR on  Dbx and oral afterload reducing agents with IABP support.  Plan is to continue to monitor Na levels and diurese.  Plan to wean IABP later this week.  Continue LVAD education.     Vikram Jean-Baptiste MD, PhD  Professor, Heart Failure and Cardiac Transplantation  [facility]

## 2017-04-10 NOTE — PLAN OF CARE
Problem: Goal Outcome Summary  Goal: Goal Outcome Summary  ST 4E: Cx- Pt continues to lay flat due to placement of balloon pump. Tolerating full liquid diet per report, but not yet ready for diet advance due to positioning restrictions and ongoing confusion. ST to follow for readiness for diet advance.

## 2017-04-10 NOTE — PLAN OF CARE
Problem: Goal Outcome Summary  Goal: Goal Outcome Summary  OT 4E: Pt limited by abdominal pain and frequent bowel movements limiting participation in session. Pt demonstrated intermittent confusion and perseverating on pain and wanting to go home. Pt completed AROM in BUEs x15-20 reps and ankle pumps to prevent contractures. Pt's VSS throughout.      Rec TCU when medically stable to progress strength, cognition and balance leading to decreased ADL I.

## 2017-04-10 NOTE — PLAN OF CARE
"Problem: Goal Outcome Summary  Goal: Goal Outcome Summary  Outcome: No Change  This morning patient was disoriented to place, situation and time.  After blinds open and lights on for a little bit patient was oriented x4. Patient expressed sadness and frustration about his situation and stated that he \"just wants to go home\". 2L N.[place]. ST  100-115bpm. MAPs 63-90. Bilateral LE edema 2+.  Patient complained of abdominal pain and tenderness on palpitation.  MD notified; abdominal xray obtained.  4 soft, formed, brown BMs. Full liquid diet and tube feeds at 40ml/hr. Free water flush decreased from 100>75>50ml/hr. IABP in place, 1:1 and 100% augmentation. UOP 300ml/hr.  Dobutamine at 5, bumex at 1, heparin at 750, insulin at 2. CVP 9-12, PA 34-38/20-24. SVO2 49-59. CO 3.7-4.5. CI 2.2-2.7. SVR 7302-1575. LVAD coordinator met with patient and family this afternoon. Plan to continue LVAD workup. Will continue to monitor and notify MD of any changes.         "

## 2017-04-10 NOTE — PLAN OF CARE
Problem: Goal Outcome Summary  Goal: Goal Outcome Summary  Outcome: No Change  Neuro: Alert, follows commands, answers appropriately, intermittent confusion. Communication via  in AM.   Resp: % on 3LNC, desats quickly on RA. Infequent nonproductive cough.Lungs dim in bases.  Cardiac: ST with HR low 100's, no ectopy. MAPs 70-80, hydralazine changed to 75mg q6h to see if pt tolerates better. CVP: 12-15, PA: 38/24, CI: 2.1, SVO2: 53-56, SVR: 1597. IABP in right groin, site c/d/i, dressing changed, 1:1, augmenting 100%. Dobutamine gtt 5mcg/kg/min, heparin gtt 750u/h, Bumex gtt 1mg/h. D5 restarted in AM, but d/c'd at 1400 when free water flushes were increased.   : Chavis in place and patent. UOP: 100-250mL/h. K replaced per protocol.  GI: Bowel sounds normal. BM x 4, loose/soft brown. TF @ 40mL/h, free water flushes increased to 100mL/h. Insulin gtt 3 u/h.   Skin: Right groin site WDL, immobilizer in place on right leg, removed for skin checks, coccyx red/blanchable otherwise intact.       Plan: Family meeting with LVAD coordinator on Monday at 1:30PM. Dr. Leung saw pt today. Continue to monitor and notify treatment team as needed.

## 2017-04-10 NOTE — PLAN OF CARE
Problem: Goal Outcome Summary  Goal: Goal Outcome Summary  Outcome: No Change  Incontinent of stool much of the night. Chavis output 150-200 ml/hr. Electrolytes replaced. IABP continues at 1:1, 100% augmented. Insulin drip 1-4 units/hr. Vss. Patient agitated and angryr on/off. Trying to climb out of bed and take immobilizer off of right leg with balloon catheter. Refusing cares. Mepilex placed to coccyx. Continue with plan of care. Update team as needed. Plan for Lvad discussion with family this afternoon.

## 2017-04-11 ENCOUNTER — APPOINTMENT (OUTPATIENT)
Dept: OCCUPATIONAL THERAPY | Facility: CLINIC | Age: 52
DRG: 228 | End: 2017-04-11
Attending: INTERNAL MEDICINE
Payer: COMMERCIAL

## 2017-04-11 ENCOUNTER — APPOINTMENT (OUTPATIENT)
Dept: CT IMAGING | Facility: CLINIC | Age: 52
DRG: 228 | End: 2017-04-11
Payer: COMMERCIAL

## 2017-04-11 ENCOUNTER — APPOINTMENT (OUTPATIENT)
Dept: SPEECH THERAPY | Facility: CLINIC | Age: 52
DRG: 228 | End: 2017-04-11
Attending: INTERNAL MEDICINE
Payer: COMMERCIAL

## 2017-04-11 ENCOUNTER — APPOINTMENT (OUTPATIENT)
Dept: GENERAL RADIOLOGY | Facility: CLINIC | Age: 52
DRG: 228 | End: 2017-04-11
Attending: INTERNAL MEDICINE
Payer: COMMERCIAL

## 2017-04-11 LAB
ALBUMIN SERPL-MCNC: 2.6 G/DL (ref 3.4–5)
ALP SERPL-CCNC: 88 U/L (ref 40–150)
ALT SERPL W P-5'-P-CCNC: 116 U/L (ref 0–70)
ANION GAP SERPL CALCULATED.3IONS-SCNC: 5 MMOL/L (ref 3–14)
ANION GAP SERPL CALCULATED.3IONS-SCNC: 7 MMOL/L (ref 3–14)
ANION GAP SERPL CALCULATED.3IONS-SCNC: 8 MMOL/L (ref 3–14)
AST SERPL W P-5'-P-CCNC: 47 U/L (ref 0–45)
BACTERIA SPEC CULT: NO GROWTH
BASE EXCESS BLDV CALC-SCNC: 10.2 MMOL/L
BASE EXCESS BLDV CALC-SCNC: 10.7 MMOL/L
BASE EXCESS BLDV CALC-SCNC: 11.6 MMOL/L
BASE EXCESS BLDV CALC-SCNC: 11.9 MMOL/L
BASE EXCESS BLDV CALC-SCNC: 12.4 MMOL/L
BASE EXCESS BLDV CALC-SCNC: 12.8 MMOL/L
BASE EXCESS BLDV CALC-SCNC: 13.1 MMOL/L
BASE EXCESS BLDV CALC-SCNC: 16 MMOL/L
BASE EXCESS BLDV CALC-SCNC: 16.2 MMOL/L
BILIRUB DIRECT SERPL-MCNC: 0.3 MG/DL (ref 0–0.2)
BILIRUB SERPL-MCNC: 0.7 MG/DL (ref 0.2–1.3)
BUN SERPL-MCNC: 50 MG/DL (ref 7–30)
BUN SERPL-MCNC: 57 MG/DL (ref 7–30)
BUN SERPL-MCNC: 62 MG/DL (ref 7–30)
CA-I BLD-MCNC: 4.6 MG/DL (ref 4.4–5.2)
CALCIUM SERPL-MCNC: 7.9 MG/DL (ref 8.5–10.1)
CALCIUM SERPL-MCNC: 8.4 MG/DL (ref 8.5–10.1)
CALCIUM SERPL-MCNC: 8.4 MG/DL (ref 8.5–10.1)
CHLORIDE SERPL-SCNC: 100 MMOL/L (ref 94–109)
CHLORIDE SERPL-SCNC: 101 MMOL/L (ref 94–109)
CHLORIDE SERPL-SCNC: 102 MMOL/L (ref 94–109)
CO2 SERPL-SCNC: 34 MMOL/L (ref 20–32)
CO2 SERPL-SCNC: 36 MMOL/L (ref 20–32)
CO2 SERPL-SCNC: 37 MMOL/L (ref 20–32)
CREAT SERPL-MCNC: 1.44 MG/DL (ref 0.66–1.25)
CREAT SERPL-MCNC: 1.54 MG/DL (ref 0.66–1.25)
CREAT SERPL-MCNC: 1.71 MG/DL (ref 0.66–1.25)
ERYTHROCYTE [DISTWIDTH] IN BLOOD BY AUTOMATED COUNT: 18.2 % (ref 10–15)
GFR SERPL CREATININE-BSD FRML MDRD: 43 ML/MIN/1.7M2
GFR SERPL CREATININE-BSD FRML MDRD: 48 ML/MIN/1.7M2
GFR SERPL CREATININE-BSD FRML MDRD: 52 ML/MIN/1.7M2
GLUCOSE BLDC GLUCOMTR-MCNC: 104 MG/DL (ref 70–99)
GLUCOSE BLDC GLUCOMTR-MCNC: 112 MG/DL (ref 70–99)
GLUCOSE BLDC GLUCOMTR-MCNC: 114 MG/DL (ref 70–99)
GLUCOSE BLDC GLUCOMTR-MCNC: 115 MG/DL (ref 70–99)
GLUCOSE BLDC GLUCOMTR-MCNC: 118 MG/DL (ref 70–99)
GLUCOSE BLDC GLUCOMTR-MCNC: 121 MG/DL (ref 70–99)
GLUCOSE BLDC GLUCOMTR-MCNC: 122 MG/DL (ref 70–99)
GLUCOSE BLDC GLUCOMTR-MCNC: 127 MG/DL (ref 70–99)
GLUCOSE BLDC GLUCOMTR-MCNC: 139 MG/DL (ref 70–99)
GLUCOSE BLDC GLUCOMTR-MCNC: 142 MG/DL (ref 70–99)
GLUCOSE BLDC GLUCOMTR-MCNC: 146 MG/DL (ref 70–99)
GLUCOSE BLDC GLUCOMTR-MCNC: 147 MG/DL (ref 70–99)
GLUCOSE BLDC GLUCOMTR-MCNC: 152 MG/DL (ref 70–99)
GLUCOSE BLDC GLUCOMTR-MCNC: 155 MG/DL (ref 70–99)
GLUCOSE BLDC GLUCOMTR-MCNC: 161 MG/DL (ref 70–99)
GLUCOSE BLDC GLUCOMTR-MCNC: 174 MG/DL (ref 70–99)
GLUCOSE BLDC GLUCOMTR-MCNC: 177 MG/DL (ref 70–99)
GLUCOSE BLDC GLUCOMTR-MCNC: 182 MG/DL (ref 70–99)
GLUCOSE BLDC GLUCOMTR-MCNC: 183 MG/DL (ref 70–99)
GLUCOSE BLDC GLUCOMTR-MCNC: 288 MG/DL (ref 70–99)
GLUCOSE BLDC GLUCOMTR-MCNC: 93 MG/DL (ref 70–99)
GLUCOSE SERPL-MCNC: 100 MG/DL (ref 70–99)
GLUCOSE SERPL-MCNC: 115 MG/DL (ref 70–99)
GLUCOSE SERPL-MCNC: 99 MG/DL (ref 70–99)
HCO3 BLDV-SCNC: 35 MMOL/L (ref 21–28)
HCO3 BLDV-SCNC: 35 MMOL/L (ref 21–28)
HCO3 BLDV-SCNC: 36 MMOL/L (ref 21–28)
HCO3 BLDV-SCNC: 37 MMOL/L (ref 21–28)
HCO3 BLDV-SCNC: 38 MMOL/L (ref 21–28)
HCO3 BLDV-SCNC: 41 MMOL/L (ref 21–28)
HCO3 BLDV-SCNC: 41 MMOL/L (ref 21–28)
HCT VFR BLD AUTO: 31.3 % (ref 40–53)
HGB BLD-MCNC: 8.5 G/DL (ref 13.3–17.7)
HGB BLD-MCNC: 8.9 G/DL (ref 13.3–17.7)
HGB BLD-MCNC: 8.9 G/DL (ref 13.3–17.7)
HGB BLD-MCNC: 9.5 G/DL (ref 13.3–17.7)
INR PPP: 1.07 (ref 0.86–1.14)
LA PPP-IMP: NORMAL
LAB SCANNED RESULT: NORMAL
LACTATE BLD-SCNC: 1.3 MMOL/L (ref 0.7–2.1)
LACTATE BLD-SCNC: 1.5 MMOL/L (ref 0.7–2.1)
LMWH PPP CHRO-ACNC: 0.18 IU/ML
LMWH PPP CHRO-ACNC: 0.19 IU/ML
MAGNESIUM SERPL-MCNC: 2.5 MG/DL (ref 1.6–2.3)
MAGNESIUM SERPL-MCNC: 2.6 MG/DL (ref 1.6–2.3)
MCH RBC QN AUTO: 30.4 PG (ref 26.5–33)
MCHC RBC AUTO-ENTMCNC: 30.4 G/DL (ref 31.5–36.5)
MCV RBC AUTO: 100 FL (ref 78–100)
MICRO REPORT STATUS: NORMAL
O2/TOTAL GAS SETTING VFR VENT: 21 %
O2/TOTAL GAS SETTING VFR VENT: ABNORMAL %
OXYHGB MFR BLDV: 41 %
OXYHGB MFR BLDV: 43 %
OXYHGB MFR BLDV: 46 %
OXYHGB MFR BLDV: 47 %
OXYHGB MFR BLDV: 50 %
OXYHGB MFR BLDV: 52 %
OXYHGB MFR BLDV: 60 %
PCO2 BLDV: 46 MM HG (ref 40–50)
PCO2 BLDV: 46 MM HG (ref 40–50)
PCO2 BLDV: 48 MM HG (ref 40–50)
PCO2 BLDV: 49 MM HG (ref 40–50)
PCO2 BLDV: 51 MM HG (ref 40–50)
PCO2 BLDV: 53 MM HG (ref 40–50)
PCO2 BLDV: 53 MM HG (ref 40–50)
PH BLDV: 7.46 PH (ref 7.32–7.43)
PH BLDV: 7.48 PH (ref 7.32–7.43)
PH BLDV: 7.49 PH (ref 7.32–7.43)
PH BLDV: 7.5 PH (ref 7.32–7.43)
PHOSPHATE SERPL-MCNC: 3.9 MG/DL (ref 2.5–4.5)
PLATELET # BLD AUTO: 174 10E9/L (ref 150–450)
PO2 BLDV: 26 MM HG (ref 25–47)
PO2 BLDV: 27 MM HG (ref 25–47)
PO2 BLDV: 28 MM HG (ref 25–47)
PO2 BLDV: 30 MM HG (ref 25–47)
PO2 BLDV: 30 MM HG (ref 25–47)
PO2 BLDV: 31 MM HG (ref 25–47)
PO2 BLDV: 31 MM HG (ref 25–47)
PO2 BLDV: 32 MM HG (ref 25–47)
PO2 BLDV: 35 MM HG (ref 25–47)
POTASSIUM BLD-SCNC: 3.7 MMOL/L (ref 3.4–5.3)
POTASSIUM SERPL-SCNC: 3.2 MMOL/L (ref 3.4–5.3)
POTASSIUM SERPL-SCNC: 4 MMOL/L (ref 3.4–5.3)
POTASSIUM SERPL-SCNC: 4.1 MMOL/L (ref 3.4–5.3)
PROT SERPL-MCNC: 6.9 G/DL (ref 6.8–8.8)
RBC # BLD AUTO: 3.13 10E12/L (ref 4.4–5.9)
SODIUM SERPL-SCNC: 141 MMOL/L (ref 133–144)
SODIUM SERPL-SCNC: 143 MMOL/L (ref 133–144)
SODIUM SERPL-SCNC: 146 MMOL/L (ref 133–144)
SPECIMEN SOURCE: NORMAL
TROPONIN I SERPL-MCNC: 2.99 UG/L (ref 0–0.04)
WBC # BLD AUTO: 12.8 10E9/L (ref 4–11)

## 2017-04-11 PROCEDURE — 40000275 ZZH STATISTIC RCP TIME EA 10 MIN

## 2017-04-11 PROCEDURE — 20000004 ZZH R&B ICU UMMC

## 2017-04-11 PROCEDURE — 97530 THERAPEUTIC ACTIVITIES: CPT | Mod: GO

## 2017-04-11 PROCEDURE — 25000132 ZZH RX MED GY IP 250 OP 250 PS 637

## 2017-04-11 PROCEDURE — 00000146 ZZHCL STATISTIC GLUCOSE BY METER IP

## 2017-04-11 PROCEDURE — 80076 HEPATIC FUNCTION PANEL: CPT

## 2017-04-11 PROCEDURE — 27210437 ZZH NUTRITION PRODUCT SEMIELEM INTERMED LITER

## 2017-04-11 PROCEDURE — 25000132 ZZH RX MED GY IP 250 OP 250 PS 637: Performed by: INTERNAL MEDICINE

## 2017-04-11 PROCEDURE — 25800025 ZZH RX 258: Performed by: INTERNAL MEDICINE

## 2017-04-11 PROCEDURE — 85027 COMPLETE CBC AUTOMATED: CPT

## 2017-04-11 PROCEDURE — 40000225 ZZH STATISTIC SLP WARD VISIT: Performed by: SPEECH-LANGUAGE PATHOLOGIST

## 2017-04-11 PROCEDURE — 92526 ORAL FUNCTION THERAPY: CPT | Mod: GN | Performed by: SPEECH-LANGUAGE PATHOLOGIST

## 2017-04-11 PROCEDURE — 99291 CRITICAL CARE FIRST HOUR: CPT | Mod: GC | Performed by: INTERNAL MEDICINE

## 2017-04-11 PROCEDURE — 25000128 H RX IP 250 OP 636

## 2017-04-11 PROCEDURE — 82805 BLOOD GASES W/O2 SATURATION: CPT | Performed by: INTERNAL MEDICINE

## 2017-04-11 PROCEDURE — 40000281 ZZH STATISTIC TRANSPORT TIME EA 15 MIN

## 2017-04-11 PROCEDURE — 40000133 ZZH STATISTIC OT WARD VISIT

## 2017-04-11 PROCEDURE — 71010 XR CHEST PORT 1 VW: CPT

## 2017-04-11 PROCEDURE — 85018 HEMOGLOBIN: CPT | Performed by: INTERNAL MEDICINE

## 2017-04-11 PROCEDURE — 80048 BASIC METABOLIC PNL TOTAL CA: CPT

## 2017-04-11 PROCEDURE — 84484 ASSAY OF TROPONIN QUANT: CPT | Performed by: INTERNAL MEDICINE

## 2017-04-11 PROCEDURE — 93010 ELECTROCARDIOGRAM REPORT: CPT | Performed by: INTERNAL MEDICINE

## 2017-04-11 PROCEDURE — 25000125 ZZHC RX 250

## 2017-04-11 PROCEDURE — 74176 CT ABD & PELVIS W/O CONTRAST: CPT

## 2017-04-11 PROCEDURE — 82330 ASSAY OF CALCIUM: CPT | Performed by: INTERNAL MEDICINE

## 2017-04-11 PROCEDURE — 83735 ASSAY OF MAGNESIUM: CPT

## 2017-04-11 PROCEDURE — 25000125 ZZHC RX 250: Performed by: STUDENT IN AN ORGANIZED HEALTH CARE EDUCATION/TRAINING PROGRAM

## 2017-04-11 PROCEDURE — 85610 PROTHROMBIN TIME: CPT

## 2017-04-11 PROCEDURE — 93005 ELECTROCARDIOGRAM TRACING: CPT

## 2017-04-11 PROCEDURE — 84100 ASSAY OF PHOSPHORUS: CPT

## 2017-04-11 PROCEDURE — 85018 HEMOGLOBIN: CPT

## 2017-04-11 PROCEDURE — 40000076 ZZH STATISTIC IABP MONITORING

## 2017-04-11 PROCEDURE — 25000125 ZZHC RX 250: Performed by: INTERNAL MEDICINE

## 2017-04-11 PROCEDURE — 83605 ASSAY OF LACTIC ACID: CPT | Performed by: INTERNAL MEDICINE

## 2017-04-11 PROCEDURE — 97110 THERAPEUTIC EXERCISES: CPT | Mod: GO

## 2017-04-11 PROCEDURE — 85520 HEPARIN ASSAY: CPT

## 2017-04-11 PROCEDURE — 84132 ASSAY OF SERUM POTASSIUM: CPT | Performed by: INTERNAL MEDICINE

## 2017-04-11 RX ORDER — QUETIAPINE FUMARATE 25 MG/1
25 TABLET, FILM COATED ORAL AT BEDTIME
Status: DISCONTINUED | OUTPATIENT
Start: 2017-04-11 | End: 2017-04-19

## 2017-04-11 RX ADMIN — DEXTROSE MONOHYDRATE: 100 INJECTION, SOLUTION INTRAVENOUS at 20:19

## 2017-04-11 RX ADMIN — HYDRALAZINE HYDROCHLORIDE 75 MG: 50 TABLET ORAL at 09:15

## 2017-04-11 RX ADMIN — POTASSIUM CHLORIDE 20 MEQ: 29.8 INJECTION, SOLUTION INTRAVENOUS at 10:00

## 2017-04-11 RX ADMIN — PANTOPRAZOLE SODIUM 40 MG: 40 INJECTION, POWDER, FOR SOLUTION INTRAVENOUS at 20:09

## 2017-04-11 RX ADMIN — MULTIVIT AND MINERALS-FERROUS GLUCONATE 9 MG IRON/15 ML ORAL LIQUID 15 ML: at 09:15

## 2017-04-11 RX ADMIN — HYDRALAZINE HYDROCHLORIDE 75 MG: 50 TABLET ORAL at 20:08

## 2017-04-11 RX ADMIN — POTASSIUM CHLORIDE 20 MEQ: 29.8 INJECTION, SOLUTION INTRAVENOUS at 04:21

## 2017-04-11 RX ADMIN — POTASSIUM CHLORIDE 20 MEQ: 29.8 INJECTION, SOLUTION INTRAVENOUS at 05:21

## 2017-04-11 RX ADMIN — HYDRALAZINE HYDROCHLORIDE 75 MG: 50 TABLET ORAL at 03:07

## 2017-04-11 RX ADMIN — PANTOPRAZOLE SODIUM 40 MG: 40 TABLET, DELAYED RELEASE ORAL at 09:21

## 2017-04-11 RX ADMIN — POTASSIUM CHLORIDE 20 MEQ: 29.8 INJECTION, SOLUTION INTRAVENOUS at 06:20

## 2017-04-11 RX ADMIN — ASPIRIN 81 MG CHEWABLE TABLET 81 MG: 81 TABLET CHEWABLE at 09:15

## 2017-04-11 RX ADMIN — HYDRALAZINE HYDROCHLORIDE 75 MG: 50 TABLET ORAL at 15:46

## 2017-04-11 RX ADMIN — CLOPIDOGREL 75 MG: 75 TABLET, FILM COATED ORAL at 09:15

## 2017-04-11 RX ADMIN — HUMAN INSULIN 1 UNITS/HR: 100 INJECTION, SOLUTION SUBCUTANEOUS at 21:50

## 2017-04-11 RX ADMIN — POTASSIUM CHLORIDE 20 MEQ: 29.8 INJECTION, SOLUTION INTRAVENOUS at 15:50

## 2017-04-11 RX ADMIN — QUETIAPINE FUMARATE 25 MG: 25 TABLET, FILM COATED ORAL at 21:45

## 2017-04-11 RX ADMIN — HUMAN INSULIN 4 UNITS/HR: 100 INJECTION, SOLUTION SUBCUTANEOUS at 06:18

## 2017-04-11 NOTE — CONSULTS
GASTROENTEROLOGY CONSULTATION      Date of Admission:  3/27/2017          ASSESSMENT AND RECOMMENDATIONS:   Assessment:  51 year old male with DMII, newly diagnosed CAD s/p STEMI on 3/26 and subsequent PCI, balloon pump use complicated by distributive and cardiogenic shock who we are asked to see for maroon stools.     # Gastrointestinal bleeding. Based minimal change in vital signs and hemoglobin, most likely this is a lower source such as mild ischemic colitis or rectal ulcer 2/2 rectal tube placement previously. There are notes of possible hematemesis on initial admission but he should be more unstable to have maroon stool due to an upper source. No upper symptoms. Unclear if having abdominal pain. Has been on heparin gtt, ASA, clopidogrel.      Recommendations  -- appreciate Cardiology working with balloon pump setting and stopping heparin gtt  -- continue excellent supportive cares  -- follow hemoglobin, would not check more than every 8 hours unless there is significant hemodynamic change  -- empiric PPI twice daily  -- will follow clinically, hold off on endoscopy for now unless persistent hemodynamically significant bleeding  -- check noncontrast abd/pelvis CT to assess for colitis.     Gastroenterology follow up recommendations: TBD    Thank you for involving us in this patient's care. Please do not hesitate to contact the GI service with any questions or concerns.     Pt care plan discussed with Dr. Benitez, GI staff physician.    Narayan Carballo  -------------------------------------------------------------------------------------------------------------------          Chief Complaint:   We were asked by [place] of Cardiology to evaluate this patient with hematochezia    History is obtained from the patient and the medical record.          History of Present Illness:   Luke Henao is a 51 year old male with a history of DMII (per family), recently diagnosed CAD s/p STEMI with multivessel stenting, on  "balloon pump with persistent hypotension and who we are asked to see for the above.   At the time of my visit Mr. Henao was confused and intermittently answering questions. His daughter was at the bedside and assisted slightly. He was admitted initially to [facility] on 3/26 with STEMI. He was transferred here later that day for advanced heart failure therapies and was put on IABP.   Since that time has been supported, on clopidogrel, asa, heparin gtt. He was discussed for LVAD placement.  Today however had two bowel movements that were maroon in color. One was a small smear, one was \"large volume.\" Since that, has had a few more small smears/leaking of dark maroon.   He at times endorses abdominal pain but is not consistent in this history. No h/o GI bleeding or a colonoscopy per his daughter. His vital signs have not changed. His hemoglobin did not change from early morning draw to current one. He has not had nausea or vomiting. Has been getting [place] feeds and mech diet.             Past Medical History:   Reviewed and edited as appropriate  CAD  DMII         Past Surgical History:   Reviewed and edited as appropriate   PCI         Previous Endoscopy:   None         Social History:   Reviewed and edited as appropriate  Social History     Social History     Marital status:      Spouse name: N/A     Number of children: N/A     Years of education: N/A     Occupational History     Not on file.     Social History Main Topics     Smoking status: Current Every Day Smoker     Packs/day: 0.50     Types: Cigarettes     Smokeless tobacco: Not on file     Alcohol use Not on file     Drug use: Not on file     Sexual activity: Not on file     Other Topics Concern     Not on file     Social History Narrative     No narrative on file            Family History:   Reviewed and edited as appropriate  No known history of gastrointestinal/liver disease or  gastrointestinal malignancies       Allergies:   Reviewed and edited as " appropriate   No Known Allergies         Medications:     Current Facility-Administered Medications   Medication     QUEtiapine (SEROquel) tablet 25 mg     pantoprazole (PROTONIX) 40 mg IV push injection     hydrALAZINE (APRESOLINE) tablet 75 mg     DOBUTamine (DOBUTREX) 1,000 mg in D5W 250 mL infusion (adult max conc)     potassium chloride SA (K-DUR/KLOR-CON M) CR tablet 20-40 mEq     potassium chloride (KLOR-CON) Packet 20-40 mEq     potassium chloride 10 mEq in 100 mL intermittent infusion     potassium chloride 10 mEq in 100 mL intermittent infusion with 10 mg lidocaine     potassium chloride 20 mEq in 50 mL intermittent infusion     pneumococcal vaccine (PNEUMOVAX 23-irma) injection 0.5 mL     acetaminophen (TYLENOL) tablet 325 mg     HYDROmorphone (PF) (DILAUDID) injection 0.3-0.5 mg     oxyCODONE (ROXICODONE) IR tablet 5-10 mg     polyethylene glycol (MIRALAX/GLYCOLAX) Packet 17 g     bisacodyl (DULCOLAX) Suppository 10 mg     ondansetron (ZOFRAN) injection 4 mg     lidocaine 1 % 1 mL     lidocaine (LMX4) kit     sodium chloride (PF) 0.9% PF flush 3 mL     sodium chloride (PF) 0.9% PF flush 3 mL     lidocaine 1 % 0.5-5 mL     lidocaine (LMX4) kit     sodium chloride (PF) 0.9% PF flush 5-50 mL     heparin lock flush 10 UNIT/ML injection 2-5 mL     dextrose 10 % 1,000 mL infusion     multivitamins with minerals (CERTAVITE/CEROVITE) liquid 15 mL     magnesium sulfate 4 g in 100 mL sterile water (premade)     magnesium sulfate 2 g in NS intermittent infusion (PharMEDium or FV Cmpd)     potassium phosphate 10 mmol in D5W 250 mL intermittent infusion     potassium phosphate 15 mmol in D5W 250 mL intermittent infusion     potassium phosphate 20 mmol in D5W 500 mL intermittent infusion     potassium phosphate 20 mmol in D5W 250 mL intermittent infusion     potassium phosphate 25 mmol in D5W 500 mL intermittent infusion     senna-docusate (SENOKOT-S;PERICOLACE) 8.6-50 MG per tablet 1-2 tablet     clopidogrel (PLAVIX)  "tablet 75 mg     aspirin chewable tablet 81 mg     naloxone (NARCAN) injection 0.1-0.4 mg     Patient is already receiving anticoagulation with heparin, enoxaparin (LOVENOX), warfarin (COUMADIN)  or other anticoagulant medication     dextrose 10 % 1,000 mL infusion     insulin 1 unit/mL in saline (NovoLIN, HumuLIN Regular) drip - ADULT IV Infusion     glucose 40 % gel 15-30 g    Or     dextrose 50 % injection 25-50 mL    Or     glucagon injection 1 mg             Review of Systems:   A complete review of systems was performed and is negative except as noted in the HPI           Physical Exam:   /50  Temp 99.5  F (37.5  C) (Oral)  Resp 16  Ht 1.575 m (5' 2.01\")  Wt 56.2 kg (123 lb 14.4 oz)  SpO2 99%  BMI 22.66 kg/m2  Wt:   Wt Readings from Last 2 Encounters:   04/11/17 56.2 kg (123 lb 14.4 oz)   03/27/17 62 kg (136 lb 11 oz)      Constitutional: lying in MICU bed answers questions intermittently, though mostly inappropriately or inconsistently  Eyes: Sclera anicteric/injected  Ears/nose/mouth/throat: Normal oropharynx without ulcers or exudate, mucus membranes moist, hearing intact  CV: tachy, regular  Respiratory: Unlabored breathing  Lymph: No supraclavicular adenopathy  Abd: Nondistended, +bs, no hepatosplenomegaly, nontender, no peritoneal signs  : scant maroon stool in perianal area and on pad underneath patient  Skin: warm, perfused, no jaundice  Neuro: alert and oriented to person, moving all extremities spontaneously  Psych: blunted affect.  MSK: No gross deformities. Protective mitts in place.          Data:   Labs and imaging below were independently reviewed and interpreted    BMP  Recent Labs  Lab 04/11/17  1227 04/11/17  0935 04/11/17  0254 04/10/17  1615 04/10/17  1046  04/10/17  0301     --  146*  --  142  --  144   POTASSIUM 4.0 3.7 3.2* 3.7 3.8  < > 4.1   CHLORIDE 100  --  101  --  98  --  100   ROB 8.4*  --  8.4*  --  8.4*  --  7.8*   CO2 36*  --  37*  --  38*  --  37*   BUN 57*  " --  62*  --  65*  --  70*   CR 1.54*  --  1.71*  --  1.68*  --  1.81*   *  --  100*  --  132*  --  157*   < > = values in this interval not displayed.  CBC  Recent Labs  Lab 04/11/17  1450  04/11/17  0254 04/10/17  0301 04/09/17  0317 04/08/17  0425   WBC  --   --  12.8* 13.3* 16.2* 16.7*   RBC  --   --  3.13* 2.91* 3.02* 3.08*   HGB 8.9*  < > 9.5* 8.7* 9.3* 9.5*   HCT  --   --  31.3* 29.5* 30.7* 31.4*   MCV  --   --  100 101* 102* 102*   MCH  --   --  30.4 29.9 30.8 30.8   MCHC  --   --  30.4* 29.5* 30.3* 30.3*   RDW  --   --  18.2* 18.0* 17.7* 16.9*   PLT  --   --  174 175 180 201   < > = values in this interval not displayed.  INR  Recent Labs  Lab 04/11/17  0254 04/10/17  0301 04/09/17  0317 04/08/17  0425   INR 1.07 1.12 1.19* 1.19*     LFTs  Recent Labs  Lab 04/11/17  0254 04/10/17  0301 04/09/17  0317 04/08/17  0425   ALKPHOS 88 88 89 96   AST 47* 44 47* 38   * 129* 169* 215*   BILITOTAL 0.7 0.9 0.8 0.8   PROTTOTAL 6.9 6.5* 6.4* 6.3*   ALBUMIN 2.6* 2.5* 2.4* 2.4*      PANC  Recent Labs  Lab 04/05/17  0328   LIPASE 1614*

## 2017-04-11 NOTE — CONSULTS
[facility]  Palliative Care Consultation         Luke Henao MRN# [id number]   Age: 51 year old YOB: 1965   Date of Admission: 3/27/2017    Reason for consult: LVAD workup       Requesting physician/service: Pablo Edwards MD  Cards 2       Recommendations        I met with Mr. Henao today, and at this time I see no reason not to proceed with further VAD workup. He seems to understand the risks and benefits, has a strong support system, and is engaged in the process. In addition, I believe he has the family support and emotional capability to cope with complications, if they occur.     LVAD preparedness plan:  Patient s legally designated health care agent: Does not have HCD, but has the paperwork and SW is helping him complete.    Patient s description of how they make decisions (by themselves, in consultation with certain close loved ones, based on advice from medical professionals, etc):  Makes all decisions with family.    Patient s personal goals/hopes for receiving the LVAD: Being able to return to work and take care of his family.    Patient s worries/concerns when considering receiving the LVAD: Patient worries about the financial impact of not being able to work. His main concern is that he has children he is supporting while they go to college, and he worries they won't be able to afford to finish their education.  How does the patient envision or anticipate his/her future with the LVAD? Unsure. He hopes it will include being able to work again.   Patient s thoughts about what constitutes a  good  quality of life: Being able to work and support his family.    Patient s thoughts about what health conditions would be unacceptable (situations the patient would want her/his doctors and loved ones to know that she/he would not want life prolonged)? Patient denies any situations that would be unacceptable to continue life prolonging treatment.     LVADs carry a risk of  "stroke which can be impairing. After a stroke, what sort of functional outcomes would be acceptable vs unacceptable to the patient? We discussed different functional deficits that can occur with a stroke. He believes he would want his life prolonged, regardless of a severe stroke or severe functional deficit.      Chronic mechanical ventilation via a tracheostomy tube is a risk. What are the circumstances the patient would want her/his physicians and family to keep them alive with long-term mechanical ventilation vs allow them to die?  He would be fine with a trach and long-term ventilation.      Disease Process/es & Symptoms     Cardiogenic shock 2/2 inferior wall STEMI, s/p 7 stents. Currently with IABP and temporary pacemaker.  CAD with patent LAD and PCA stents, occluded LCx  AMS resulting in intubation 3/26-4/3  Aspiration likely on 3.26  Constipation possible ileus  DM Type II Hbg A1c 7.5 on admission     Support/Coping     How do you make sense of this? Struggles to make sense of the situation. He was previously a healthy, strong man. Now he feels weak and hungry and just lies in bed most of the day. He prays for strength.   Are you Denominational? Spiritual? Not so much?  Cheondoism   Are you at peace? yes  What are your concerns, medical and non-medical, that are not being addressed? Financial concerns for children he supports while they attend college  Who are your \"go-to\" people when you need support? family  Plan for psychosocial/spiritual support/follow-up from palliative team: No needs identified at this time.      Decision-Making & Goals of Care     Discussion/counseling today about prognosis/goals of care/decisions:   Yes, see discussion above  Patient has a completed health care directive available in the chart (Y/N): no  Health care agent (only if patient has an available, complete HCD):  There is no POLST form on file for this patient  Code Status: Full code   Patient has decision-making capacity (Y/N): " yes    Coordination of Care     Findings & plan of care discussed with: Cards 2  Follow-up plan from palliative team: no needs identified that require follow up, so will sign off.   Thank you for involving us in the patient's care.     RUBI Evans CNP  Palliative Care Consult Team  Pager: [phone]    80 minutes spent with patient, with >50% counseling and in care coordination.  Face-to-face time: [id number] with professional  present          Chief Complaint     Pre LVAD planning         History of Present Illness     History obtained from: chart review     Luke Henao is a 51 year old male who is being seen by the Palliative Care team today as part of pre-VAD planning. Mr. Heano has CAD, DM type II, and is a current smoker. He was transferred to [facility] from [facility] on 3/27, in cardiogenic shock 2/2 to an inferior wall STEMI. He is s/p 7 stents, IABP placement, and placement of temporary pacemaker.          Past Medical History:   This patient  has no past medical history on file.           Past Surgical History:   This patient  has no past surgical history on file.            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             Allergies:   This patient has No Known Allergies.           Medications:   I have reviewed this patient's medication profile and medications during this hospitalization.           Review of Systems:   The comprehensive review of systems is negative other than noted here and in the HPI.    Palliative  Symptom Review (0=no symptom/no concern, 1=mild, 2=moderate, 3=severe):      Pain: 0      Fatigue: 3      Nausea: 0          Physical Exam:   *Professional  was present for the entire exam and interview.     Temp: 98.5  F (36.9  C) Temp src: Oral BP: 112/78   Heart Rate: 112 Resp: 16 SpO2: 96 % O2 Device: None (Room air) Oxygen Delivery: 2 LPM  Constitutional: Pleasant male seen in hospital bed, in no acute distress.  Head: Normocephalic. Atraumatic. Face symmetrical. PERRL. EOMI. Mucous membranes moist.   CV: IABP running.  Extremities: Warm and well perfused. Pulses palpable throughout. 1+ edema.  Pulm: Non-labored breathing.   Musculoskeletal: DEL RIO.   Skin: Warm and dry. No concerning rashes or lesions on exposed areas.   Neuropsych: A/O x 4. Cranial Nerves ll-Xll appear grossly intact. Speech clear. Tangential at times. Memory and insight intact.          Data Reviewed:     ROUTINE ICU LABS (Last four results)  CMP  Recent Labs  Lab 04/12/17  0746 04/12/17  0352 04/11/17  1955 04/11/17  1227  04/11/17  0254 04/10/17  1615  04/10/17  0301  04/09/17  0317  04/08/17  0425  04/06/17  2149   NA  --  141 143 141  --  146*  --   < > 144  < > 147*  < > 149*  < >  --    POTASSIUM 3.9 3.7 4.1 4.0  < > 3.2* 3.7  < > 4.1  < > 3.7  < > 4.0  < > 3.1*   CHLORIDE  --  102 102 100  --  101  --   < > 100  < > 102  < > 106  < >  --    CO2  --  31 34* 36*  --  37*  --   < > 37*  < > 37*  < > 37*  < >  --    ANIONGAP  --  7 7 5  --  8  --   < > 7  < > 8  < > 7  < >  --    GLC  --  110* 99 115*  --  100*  --   < > 157*  < > 144*  < > 155*  < >  --    BUN  --  46* 50* 57*  --  62*  --   < > 70*  < > 83*  < > 92*  < >  --    CR  --  1.50* 1.44* 1.54*  --  1.71*  --   < > 1.81*  < > 2.23*  < > 2.84*  < >  --    GFRESTIMATED  --  50* 52* 48*  --  43*  --   < > 40*  < > 31*  < > 24*  < >  --    GFRESTBLACK  --  60* 63 58*  --  52*  --   < > 48*  < > 38*  < > 29*  < >  --    ROB  --  7.8* 7.9* 8.4*  --  8.4*  --   < > 7.8*  < >  7.4*  < > 7.3*  < >  --    MAG  --  2.6*  --  2.5*  --  2.6* 2.5*  --  2.6*  < > 2.5*  < > 2.5*  < > 3.1*   PHOS  --  3.8  --   --   --  3.9  --   --   --   --   --   --  3.5  --  5.2*   PROTTOTAL  --  6.4*  --   --   --  6.9  --   --  6.5*  --  6.4*  --  6.3*  < >  --    ALBUMIN  --  2.4*  --   --   --  2.6*  --   --  2.5*  --  2.4*  --  2.4*  < >  --    BILITOTAL  --  1.0  --   --   --  0.7  --   --  0.9  --  0.8  --  0.8  < >  --    ALKPHOS  --  69  --   --   --  88  --   --  88  --  89  --  96  < >  --    AST  --  30  --   --   --  47*  --   --  44  --  47*  --  38  < >  --    ALT  --  83*  --   --   --  116*  --   --  129*  --  169*  --  215*  < >  --    < > = values in this interval not displayed.  CBC  Recent Labs  Lab 04/12/17  0352 04/11/17  2149 04/11/17  1450 04/11/17  1035 04/11/17  0254 04/10/17  0301 04/09/17  0317   WBC 11.6*  --   --   --  12.8* 13.3* 16.2*   RBC 2.71*  --   --   --  3.13* 2.91* 3.02*   HGB 8.1* 8.5* 8.9* 8.9* 9.5* 8.7* 9.3*   HCT 26.8*  --   --   --  31.3* 29.5* 30.7*   MCV 99  --   --   --  100 101* 102*   MCH 29.9  --   --   --  30.4 29.9 30.8   MCHC 30.2*  --   --   --  30.4* 29.5* 30.3*   RDW 18.2*  --   --   --  18.2* 18.0* 17.7*   *  --   --   --  174 175 180     INR  Recent Labs  Lab 04/12/17  0352 04/11/17  0254 04/10/17  0301 04/09/17  0317   INR 1.12 1.07 1.12 1.19*     Arterial Blood Gas  Recent Labs  Lab 04/12/17  0746 04/12/17  0352 04/11/17  2352 04/11/17  1955  04/06/17  0620   PH  --   --   --   --   --  7.46*   PCO2  --   --   --   --   --  45   PO2  --   --   --   --   --  97   HCO3  --   --   --   --   --  32*   O2PER 21 2L 2L 2L  < > 2L   < > = values in this interval not displayed.

## 2017-04-11 NOTE — PLAN OF CARE
Problem: Goal Outcome Summary  Goal: Goal Outcome Summary  Outcome: No Change  Restless and agitated most of night, wanting to get out of bed. Confused Perrla, speech clear. Verbally abusive at times. Soft mitts placed as patient was pulling on swan line and IABP.  Sinus tach. [place] remains intact despite pulling on line and getting it wrapped around arm and under neck. PA 40/20. CO 3.5, CI 2.5. Vss with only dropping MAP once to 50's. IABP remains intact despite pulling it out once.Insulin drip on/off with BS ranging fron . Potassium replaced for K of 3.2.  Remains on 2 l/nc.  Lung fields cl/diminished. Cough effort fair, non productive.  Tube feeds continue at 40 ml/hr. Free water increased to 75 ml/hr. Fewer stools than previous night but remains incontinent.  Urine output 200 mls or more per hour. Bumex drip on hold per verbal oreder from call team.  Continue plan of care. Lvad work up today. Continue to update team and family with changes.

## 2017-04-11 NOTE — PROGRESS NOTES
Met with patient, significant other, and adult daughter to present the Heartmate 3, Heartmate 2, and HVAD LVADs as a possible treatment for advanced heartfailure.  Portuguese  present and assisting with interpretation throughout discussion. Discussed patient and caregiver responsibilities before and after LVAD implant.  Clarified the need for patient to identify a caregiver to be present for education and to assist patient for first 30 days after a patient returns home. Discussed basic overview of LVAD operation, management, need for anticoagulation, and frequency of follow-up clinic appointments.  Discussed restrictions of having an LVAD such as no swimming, bathing, boats, MRI's, or arc welding.   Patient and significant other provided the VAD What You Should Know pt. educational brochure.  Information regarding the LVAD and transplant support groups given to patient and significant other. LVAD coordinator contact information provided.  Encouraged patient, significant other and adult daughter to call with questions.

## 2017-04-11 NOTE — PROVIDER NOTIFICATION
Notified Dr. Loving. SVO2 at 0930 was 43. Recheck at 1030 was 41.  CI 1.9. Order to hold off on weaning IABP to 1:3 and keep at 1:1. Also patient had two small BMs maroon in color.    Simona Richardson RN

## 2017-04-11 NOTE — PROGRESS NOTES
"[facility]  Cardiology II Service / Advanced Heart Failure  Daily Note        Interval History:   - Has been intermittently confused over the last day or so; overnight pulled his IABP out by a couple of cms, was repositioned by the night team, adequate position on xray and IABP functioning fine since  - This AM had two blood tinged BMs  - Tolerated Hydral 75 q6  - WBC downtrending  - Diuresis held overnight for CVP of 9 and alkalemia on ABG        Pertinent Medications:  I have reviewed this patient's current medications      hydrALAZINE  75 mg Oral Q6H     pneumococcal vaccine  0.5 mL Intramuscular Once     pantoprazole  40 mg Oral or Feeding Tube Daily     polyethylene glycol  17 g Oral Daily     sodium chloride (PF)  3 mL Intracatheter Q8H     multivitamins with minerals  15 mL Per Feeding Tube Daily     senna-docusate  1-2 tablet Oral or NG Tube BID     clopidogrel  75 mg Oral or NG Tube Daily     aspirin  81 mg Oral or Feeding Tube Daily     Hemodynamics:  CVP: 15  PA: 42/24  Mean PA: 30,  PCW:25  CO/CI: 3.5/2.1,  SVR: 1588    Examination:  /77  Temp 98.4  F (36.9  C) (Oral)  Resp 18  Ht 1.575 m (5' 2.01\")  Wt 56.2 kg (123 lb 14.4 oz)  SpO2 96%  BMI 22.66 kg/m2  GEN: A, cooperative and conversational   NECK: can not assess JVP   RESP: crackles   CV: S1S2S3, holossystolic murmur at the apex  ABD: Soft, nontender to palpation, no HSM, +BS, no guarding  EXT: No peripheral edema, warm/well perfused   NEURO: CN II-XII intact, normal 5/5 strength in all extremitites  SKIN: Normal skin turgor, no rash on limited exam    Data:  CMP  Recent Labs  Lab 04/11/17  0935 04/11/17  0254 04/10/17  1615 04/10/17  1046  04/10/17  0301 04/09/17  2300  04/09/17  0317  04/08/17  0425  04/06/17  2149  04/06/17  0351   NA  --  146*  --  142  --  144 143  < > 147*  < > 149*  < >  --   < > 150*   POTASSIUM 3.7 3.2* 3.7 3.8  < > 4.1 3.1*  < > 3.7  < > 4.0  < > 3.1*  < > 3.4  3.5 "   CHLORIDE  --  101  --  98  --  100 97  < > 102  < > 106  < >  --   < > 105   CO2  --  37*  --  38*  --  37* 40*  < > 37*  < > 37*  < >  --   < > 30   ANIONGAP  --  8  --  6  --  7 6  < > 8  < > 7  < >  --   < > 15*   GLC  --  100*  --  132*  --  157* 140*  < > 144*  < > 155*  < >  --   < > 128*   BUN  --  62*  --  65*  --  70* 72*  < > 83*  < > 92*  < >  --   < > 105*   CR  --  1.71*  --  1.68*  --  1.81* 1.94*  < > 2.23*  < > 2.84*  < >  --   < > 5.10*   GFRESTIMATED  --  43*  --  44*  --  40* 37*  < > 31*  < > 24*  < >  --   < > 12*   GFRESTBLACK  --  52*  --  53*  --  48* 45*  < > 38*  < > 29*  < >  --   < > 15*   ROB  --  8.4*  --  8.4*  --  7.8* 7.9*  < > 7.4*  < > 7.3*  < >  --   < > 7.2*   MAG  --  2.6* 2.5*  --   --  2.6* 2.6*  < > 2.5*  < > 2.5*  < > 3.1*  < > 3.4*   PHOS  --  3.9  --   --   --   --   --   --   --   --  3.5  --  5.2*  --  7.4*   PROTTOTAL  --  6.9  --   --   --  6.5*  --   --  6.4*  --  6.3*  < >  --   --  6.6*   ALBUMIN  --  2.6*  --   --   --  2.5*  --   --  2.4*  --  2.4*  < >  --   --  2.5*   BILITOTAL  --  0.7  --   --   --  0.9  --   --  0.8  --  0.8  < >  --   --  1.3   ALKPHOS  --  88  --   --   --  88  --   --  89  --  96  < >  --   --  77   AST  --  47*  --   --   --  44  --   --  47*  --  38  < >  --   --  67*   ALT  --  116*  --   --   --  129*  --   --  169*  --  215*  < >  --   --  396*   < > = values in this interval not displayed.  CBC    Recent Labs  Lab 04/11/17  1035 04/11/17  0254 04/10/17  0301 04/09/17  0317 04/08/17  0425   WBC  --  12.8* 13.3* 16.2* 16.7*   RBC  --  3.13* 2.91* 3.02* 3.08*   HGB 8.9* 9.5* 8.7* 9.3* 9.5*   HCT  --  31.3* 29.5* 30.7* 31.4*   MCV  --  100 101* 102* 102*   MCH  --  30.4 29.9 30.8 30.8   MCHC  --  30.4* 29.5* 30.3* 30.3*   RDW  --  18.2* 18.0* 17.7* 16.9*   PLT  --  174 175 180 201     INR    Recent Labs  Lab 04/11/17  0254 04/10/17  0301 04/09/17  0317 04/08/17  0425   INR 1.07 1.12 1.19* 1.19*     Arterial Blood Gas  Recent Labs  Lab  04/11/17  1036 04/11/17  0935 04/11/17  0749 04/11/17  0254  04/06/17  0620  04/05/17  0850   PH  --   --   --   --   --  7.46*  --  7.48*   PCO2  --   --   --   --   --  45  --  41   PO2  --   --   --   --   --  97  --  84   HCO3  --   --   --   --   --  32*  --  31*   O2PER 21.0 21.0 21 2L  < > 2L  < > 3L   < > = values in this interval not displayed.    Imaging Studies:  Echo 3/27  The visual ejection fraction is estimated at 30-35%.  There is extensive inferior, inferoseptal, and inferolateral wall akinesis.  Basal/mid lateral wall hypokinesis  There is moderate to severe septal hypokinesis.  Septal wall motion abnormality may reflect pacemaker activation.  There is a catheter/pacemaker lead seen in the right ventricle.  The right ventricle is mildly dilated.  Severely decreased right ventricular systolic function  There is no pericardial effusion.  The rhythm was paced.  Compared to the prior study, the LVEF appears somewhat decreased. Septal wall  motion abnormality larger- may reflect, in part, that patient in sinus tach on  prior study, and ventricular paced now. No hemodynamically significant  valvular abnormalities on 2D or color flow imaging     CT chest/abdomen 3/27   IMPRESSION:   1. Small mediastinal hemorrhage, small bilateral pleural effusions  which may be hemorrhagic. Small intraperitoneal hemorrhage. Minimal  pericardial hemorrhage.  2. Minimal pneumoperitoneum in the left paracolic gutter, of unknown  significance. No pneumatosis as clinically questioned.  3. IABP slightly low in position.  4. Bilateral pulmonary dependent consolidations represent compression  atelectasis, however differentials include aspiration.     Cath 3/26 [facility]  SUMMARY:   --Inferior STEMI w/ late presentation. Culprit dictated by complete  heart block, and cardiogenic shock. Emergent echo in the Cath Lab also  shows significant RV dysfunction.  --Three vessel coronary artery disease with diffuse coronary  disease  out to the distal vessels  --Successful POBA of the prox and mid-RCA. Stent was not placed, in  anticipation he may need to be considered for bypass surgery in the  near future  --Insertion of a temporary pacemaker for bradycardia (AVB) and  hypotension  --Insertion of a IABP  --Upper GI bleed consistent with [place]     Cath [place] 3/27     CT head 3/28: normal      Echo 3/29  Interpretation Summary  Limited study. Ischemic CM. Moderately (EF 30-35%) reduced left ventricular  function is present. Traced at 32%.  Posterior wall akinesis is present. Lateral wall akinesis is present. Inferior  wall akinesis is present.  Right ventricular function, chamber size, wall motion, and thickness are  normal.  Dilation of the inferior vena cava is present with abnormal respiratory  variation in diameter.     Compared to prior study, RV fxn is improved.     Echo 3/31  Left Ventricle  The Ejection Fraction is estimated at 50-55%. Inferior,posterior,lateral,basal  septal wall akinesis as reported before. No change.     CORONARY ANGIOGRAM 4/1:   1. Both coronary arteries arise from their respective cusps.  2. Right dominant.  3. LM has mild luminal irregularities.   4. LAD supplies the apex along with the RCA (type 2 LAD) and gives rise to septal perforators and a large and branching D1. There are widely patent stents extending from the proximal to mid LAD. The dLAD has mild to moderate disease to 30% stenosis. The D1 is jailed by the LAD stents, but has EBER 3 flow with disease to 30% stenosis.   5. The small ramus is no longer present.  6. LCX is occluded at the ostium.   7. RCA gives rise to PL branches and supplies PDA. There are widely patent stents extending from the proximal to mid RCA. The remainder of the mRCA has disease to 50% stenosis and the dRCA has disease to 10% stenosis. The RPDA has a widely patent stent and the remainder of the artery has mild disease to 10% stenosis. The RPLA has small  vessels with diffuse disease including a distal branch occlusion. The RPLA supplies some small collateral branches to the dLCx.      COMPLICATIONS:  1. None      SUMMARY:   1. Cardiogenic shock following an inferior STEMI.  2. The LCx re-occlusion is not surprising as the recently revascularized LCx  had poor outflow and the revascularized portion did not supply a large territory (limited to no flow was present distal to the stented segment immediately following stenting). Repeat revascularization of the LCx would result in the same outcome of repeat closure and also risk embolizing thrombus from the LCx into the LAD so no treatment of the occluded LCx was performed.   3. Three vessel coronary artery disease with patent LAD and RCA stents and an occluded LCx.     Echo 4/3  Left ventricular size is normal.  The Ejection Fraction is estimated at 35-40%.  Inferior wall akinesis is present.  Lateral wall akinesis is present.  Posterior wall akinesis is present.  Right ventricular function, chamber size, wall motion, and thickness are  normal.  Moderate mitral insufficiency is present.  Moderate tricuspid insufficiency is present.  Right ventricular systolic pressure is 47mmHg above the right atrial pressure.  The inferior vena cava is normal.     Echo 4/5  Moderately (EF 35-40%) reduced left ventricular function is present. Traced at  40%.  Moderate mitral insufficiency is present.  The cause of the mitral insufficiency appears to be restricted posterior  leaflet from posterior MI. No mitral leaflet pathology seen.  Dilation of the inferior vena cava is present with abnormal respiratory  variation in diameter.     CT abd without contrast 4/4  IMPRESSION:   1. No evidence of ischemic bowel, obstruction, or intra-abdominal  infection.  2. Bibasilar patchy pulmonary opacities likely represent combination  of aspiration and atelectasis.  3. Small amount of simple free fluid within the lower abdomen.  4. Small left  retroperitoneal hematoma along the iliac vasculature  likely postprocedural.   5. Unchanged size of bilateral pleural effusions, which now consist of  simple fluid.    Assessment and Plan  51 year old man presented with cardiogenic shock from inferior STEMI s/p RCA aspiration thrombectomy and POBA on 3/26 at [facility] s/p IABP and initiation of Dopamine, also during procedure, CHB s/p temp pacer, emesis/hematmesis and altered mental status s/p intubation transferred here for consideration of mechanical support on 3/27. Had PCI to RCA, LAD and LCx (on ASA and plavix) started on epinephrine in addition to dopamine, post procedure developed distributive shock picture from infection (aspiration pneumonia? Sputum cx growing staph aureus, on vanco and zosyn) vs post-MI SIRS response. Currently in cardiogenic shock with IABP in situ    Card  # Cardiogenic shock 4/3- from underlying 3v CAD-has had high SVR and low CI   # Mixed shock initially: Cardiogenic from biventricular failure from inferior STEMI and distributive shock from post-MI SIRS response vs aspiration pneumonia/pneumonitis from possible aspiration during cath at [facility] on 3/26  # Due to inferior wall STEMI. S/P 7 stents to LAD, circ, RCA (angiogram report pending). Now with IABP, temporary pacemaker after 3rd deg heart block in cath lab at [facility] on 3/26   # Small pericardial hemorrhage  - IABP in place  - Hydral 75 mg q6H, once his SCr improves will consider starting Nipride for a more stable afterload reduction regimen, and give him an IABP wean trial  - Hold off on isordil for now  - LVAD work up given SCr improving      # Neuro  - alert, follows commands  - Intermittently confused, no focal deficits, WBC down trending         Respiratory  #Intubated for airway protection due to mental status and emesis on 3/26, extubated 4/3  #Probable Aspiration PNA/pneumonitis vs MSSA pneumonia  #Small mediastinal hemorrhage         # ID  - fever on  4/3-type 2 MI vs infection (unclear source)  - Sepsis from aspiration pneumonia/pneumonitis vs MSSA pneumonia vs post-MI SIRS response initially on 3/28  - repeat cultures if febrile  - Currently off antibiotics. He was on Unasyn. Switched to vanco and zosyn (4/3 - 4/7) for fever and elevated lactate concern for sepsis         # Endocrine  T2DM: HA1C 7.5 on admission. BG climibing to 170s since admission. Will likely continue to rise with steroids.   - Insulin gtt per nurising protocol with hypoglycemia precautions.       # GI  - Had two blood tinged BMs this morning, if his Hb drops or if he has more such BMs will consult GI     Minimal pneumoperitoneum, unclear etiology: Seen by General surgery.  - had bloody NG output earlier in hospitalization. Was on PPI BID switched to once daily 4/3   - Conservative Mx for now     Shock liver injury with coagulopathy-improving trend LFTs,   -Vit K 2.5mg 3/28, Vit K 3/29        # Renal/  Renal function slightly improved- from hypoperfusion from cardiogenic shock 4/5 plus central venous congestion plus contrast nephropathy  -Diuresis  - Daily Cr  - Avoid nephrotoxins as able  - Chavis placed 3/26     -Hypernatremia  - Stopped his D5W, only getting FWF at 75/hr.       #Hem  - Leukocytosis of unknown etiology. No obvious source of infection. Urine clear, no suspicious oral secretions, no skin erythema. Stool neg for CDiff. Blood Cx negative on 4/5  - Anemia- multifactorial      FEN: feeding tube  Code: FULL  PPx: PPI 40mg daily, senna PRN  Dispo: pending stability        Plan of care discussed with Dr. Estiven Loving MD  Cardiovascular Disease Fellow  Pager: [phone]      I have reviewed today's vital signs, notes, medications, labs and imaging.  I have also seen and examined the patient and agree with the findings and plan as outlined above.  Pt with IABP 1:1 and hx of IWMI on Dbx 5 mcg/kg/min.  Afebrile with  and /67, CVP 8, CI 2.1 on hydralazine.  Tachy  S1 and S2 with S3 and III/VI HSM. Abd is benign.  Labs with Cr 1.71,  and WBC 12.8.  Assessment: Cardiogenic shock S/P IWMI with MR on IABP and Dbx therapy.  Follow closely for stress ulcers, continue TF and attempt to wean IABP to determine if we can remove.  May need LVAD--will continue education.  Total critical care time 30 min.      Vikram Jean-Baptiste MD, PhD  Professor, Heart Failure and Cardiac Transplantation  [facility]

## 2017-04-11 NOTE — PROGRESS NOTES
Called to bedside less than 1 min after patient partially removed balloon pump from the sheath. Pump was on standby when I arrived. Sheath was still in place. I used a chloroprep to sterilize the area and I reinserted the balloon pump. After turning it back to 1:1 the balloon appears to be functioning normally. Chest XR confirms the tip of the balloon in appropriate position. Will use hand mittens and 1:1 sitter if necessary to prevent further problems.     Gallo Ibarra MD  Cardiovascular Medicine Fellow, PGY-5  [phone]

## 2017-04-11 NOTE — PROGRESS NOTES
"Social Work  D: SW met with Earlene and daughter with Moroccan  to begin lvad psychosocial evaluation.  I: Discussed need for caregiver and support group availability. Attempted to discuss other evaluation topics.  A: Earlene spoke over the  most of the time. Daughter is English & Moroccan speaking. Wife joined us at the end of our conversation today. Earlene was very focused on wanting food and to go home. Was difficult to redirect him, but could get him to answer direct questions. He does not currently seem to understand how sick he is and that he's currently \"feeling better\" due to multiple medical interventions that cannot be done at home.   P: SW scheduled more time with  for Wednesday at 10am. Will be available as needed for lvad related questions as needed.  Pager [phone]  "

## 2017-04-11 NOTE — PLAN OF CARE
Problem: Goal Outcome Summary  Goal: Goal Outcome Summary  MD called to bedside as patient pulled dressing from right groin IABP site and line was pulled about 2 inches from site. MD cleaned site with chloraprep and re advanced catheter. X-ray done. Site re dressed. Mitts placed Charge nurse notified. Nurse in room at bedside.

## 2017-04-11 NOTE — PROGRESS NOTES
CLINICAL NUTRITION SERVICES - REASSESSMENT NOTE     Nutrition Prescription    RECOMMENDATIONS FOR MDs/PROVIDERS TO ORDER:  1. Diet adv per SLP discretion.     2. Free water adjustments per MD/provider discretion pending Na trend.     Malnutrition Status:    Possible non-severe malnutrition in the context of acute illness pending weight/fluid trends.    Recommendations already ordered by Registered Dietitian (RD):  1. Given K+/Phos trend, rec transition back to Peptamen 1.5 @ goal 45 ml/hr (1080 ml/day) to provide 1620 kcal (29 kcal/kg), 73 g PRO (1.3 g/kg), 832 ml free H2O, 60 g Fat (70% from MCTs), 203 g CHO and no Fiber daily per dosing weight of 56 kg.    2. Rec send any flavor Ensure Plus between meals @ 10am daily, each provides 350 kcal and 13 g PRO.     3. Rec begin calorie counts tomorrow x3 days to monitor PO intake and ongoing need for continuous TF/FT.     Future/Additional Recommendations:   If PO intake (per initial calorie count data) shows patient meeting at least 50% of estimated need (700 kcal and 35 g PRO), consider cycling TF (Peptamen 1.5) @ 45 ml/hr x 12 hrs (8pm-8am) to provide ~50% of above provisions.     EVALUATION OF THE PROGRESS TOWARD GOALS   Diet: Full liquid diet since 4/8, SLP following and rec upgrade to dysphagia diet level 2 per discussion with RN    Nutrition Support: Receiving TF (Nepro as of 4/6 given elevated Phos) @ goal 40 ml/hr via nasoduodenal FT placed by SUZANNA with Olegario on 3/29, secured with Bridle    Intake: Fair-good diet tolerance and consuming 50% of meals per RN documentation, patient interested in trying oral nutrition supplements, 7 day TF/D5 avg provided 1704 kcal (28 kcal/kg and 73 g PRO (1.2 g/kg) daily per previous dosing weight of 61 kg which meets 100% of previously estimated energy and protein needs     NEW FINDINGS   -CV: LVAD work up. On dobutamine. IABP replaced 4/5.  -GI: Having x1-5 BMs/day over the past ~week, noted to have bloody/red stool output  today. On bowel regimen. AXR on 4/10 showed nonobstructive bowel gas pattern.  -Weight: 56.2 kg, down 7.5% from weight on 4/4. Suspect fluid related and actual weight loss. See edema below. Net negative this adm.  -Skin: John: 14, Nutrition John: 3. On multivitamin with minerals daily for micronutrient supplementation.   -Labs/Meds: K+ low @ 3.2, Mg++ elevated @ 2.6. Phos WNL since 4/8. Na elevated @ 146 (up from yesterday), receiving free water of 75 ml q 1 hr x 24 hrs as of today. Hemoglobin A1C elevaetd @ 7.1 on 4/8, on insulin drip.    Updated ASSESSED NUTRITION NEEDS (updated dosing weight of 56 kg)  Estimated Energy Needs: 7736-4877 kcals/day (25 - 30+ kcals/kg)  Justification: Maintenance  Estimated Protein Needs: 67-84 grams protein/day (1.2 - 1.5 grams of pro/kg)  Justification: Hypercatabolism with critical illness    MALNUTRITION  % Intake: Decreased intake does not meet criteria  % Weight Loss: > 2% in 1 week  Subcutaneous Fat Loss: None observed per previous RD note, unable to fully evaluate today as patient busy with RN cares during visit/talk  Muscle Loss: None observed per previous RD note, unable to fully evaluate today as patient busy with RN cares during visit/talk  Fluid Accumulation/Edema: Trace-mild  Malnutrition Diagnosis: Possible non-severe malnutrition in the context of acute illness pending weight/fluid trends    Previous Goals   Total avg nutritional intake to meet a minimum of 25 kcal/kg and 1.2 g PRO/kg daily (per dosing wt 61 kg).   Evaluation: Met    Previous Nutrition Diagnosis  Inadequate protein-energy intake related to TF advancing to goal over past week as evidenced by average 5-day TF intakes met 20 kcal/kg and 1 kg/kg protein (with needs of 25 kcal/kg and 1.2 g/kg PRO), 3.6% wt loss in 1 week.    Evaluation: Resolved    CURRENT NUTRITION DIAGNOSIS  Predicted inadequate nutrient intake (protein-energy) related to reliant on TF for majority of nutrition given limited diet  order with IABP in place.       INTERVENTIONS  Implementation  Enteral Nutrition - Modify composition, concentration and rate- Adjusted TF order as rec above.   Medical food supplement therapy- Ordered Ensure Plus as rec above.   Ordered calorie counts as rec above.    Goals  Total avg nutritional intake to meet a minimum of 25 kcal/kg and 1.2 g PRO/kg daily (per dosing wt 56 kg).    Monitoring/Evaluation  Progress toward goals will be monitored and evaluated per protocol.      Mariangel Salmon RD, LD (pager [phone])  RD will continue to follow

## 2017-04-12 ENCOUNTER — APPOINTMENT (OUTPATIENT)
Dept: GENERAL RADIOLOGY | Facility: CLINIC | Age: 52
DRG: 228 | End: 2017-04-12
Attending: INTERNAL MEDICINE
Payer: COMMERCIAL

## 2017-04-12 LAB
ALBUMIN SERPL-MCNC: 2.4 G/DL (ref 3.4–5)
ALBUMIN UR-MCNC: NEGATIVE MG/DL
ALP SERPL-CCNC: 69 U/L (ref 40–150)
ALT SERPL W P-5'-P-CCNC: 83 U/L (ref 0–70)
ANION GAP SERPL CALCULATED.3IONS-SCNC: 6 MMOL/L (ref 3–14)
ANION GAP SERPL CALCULATED.3IONS-SCNC: 7 MMOL/L (ref 3–14)
ANION GAP SERPL CALCULATED.3IONS-SCNC: 8 MMOL/L (ref 3–14)
APPEARANCE UR: CLEAR
AST SERPL W P-5'-P-CCNC: 30 U/L (ref 0–45)
BACTERIA #/AREA URNS HPF: ABNORMAL /HPF
BASE EXCESS BLDV CALC-SCNC: 10.2 MMOL/L
BASE EXCESS BLDV CALC-SCNC: 5 MMOL/L
BASE EXCESS BLDV CALC-SCNC: 5.7 MMOL/L
BASE EXCESS BLDV CALC-SCNC: 7.7 MMOL/L
BASE EXCESS BLDV CALC-SCNC: 8.5 MMOL/L
BASE EXCESS BLDV CALC-SCNC: 9.9 MMOL/L
BILIRUB DIRECT SERPL-MCNC: 0.3 MG/DL (ref 0–0.2)
BILIRUB SERPL-MCNC: 1 MG/DL (ref 0.2–1.3)
BILIRUB UR QL STRIP: NEGATIVE
BUN SERPL-MCNC: 37 MG/DL (ref 7–30)
BUN SERPL-MCNC: 42 MG/DL (ref 7–30)
BUN SERPL-MCNC: 46 MG/DL (ref 7–30)
CA-I BLD-MCNC: 4.5 MG/DL (ref 4.4–5.2)
CALCIUM SERPL-MCNC: 7.6 MG/DL (ref 8.5–10.1)
CALCIUM SERPL-MCNC: 7.8 MG/DL (ref 8.5–10.1)
CALCIUM SERPL-MCNC: 7.8 MG/DL (ref 8.5–10.1)
CHLORIDE SERPL-SCNC: 102 MMOL/L (ref 94–109)
CHLORIDE SERPL-SCNC: 103 MMOL/L (ref 94–109)
CHLORIDE SERPL-SCNC: 103 MMOL/L (ref 94–109)
CO2 SERPL-SCNC: 30 MMOL/L (ref 20–32)
CO2 SERPL-SCNC: 30 MMOL/L (ref 20–32)
CO2 SERPL-SCNC: 31 MMOL/L (ref 20–32)
COLOR UR AUTO: YELLOW
CREAT SERPL-MCNC: 1.48 MG/DL (ref 0.66–1.25)
CREAT SERPL-MCNC: 1.48 MG/DL (ref 0.66–1.25)
CREAT SERPL-MCNC: 1.5 MG/DL (ref 0.66–1.25)
ERYTHROCYTE [DISTWIDTH] IN BLOOD BY AUTOMATED COUNT: 18.2 % (ref 10–15)
GFR SERPL CREATININE-BSD FRML MDRD: 50 ML/MIN/1.7M2
GLUCOSE BLDC GLUCOMTR-MCNC: 101 MG/DL (ref 70–99)
GLUCOSE BLDC GLUCOMTR-MCNC: 106 MG/DL (ref 70–99)
GLUCOSE BLDC GLUCOMTR-MCNC: 111 MG/DL (ref 70–99)
GLUCOSE BLDC GLUCOMTR-MCNC: 112 MG/DL (ref 70–99)
GLUCOSE BLDC GLUCOMTR-MCNC: 119 MG/DL (ref 70–99)
GLUCOSE BLDC GLUCOMTR-MCNC: 120 MG/DL (ref 70–99)
GLUCOSE BLDC GLUCOMTR-MCNC: 124 MG/DL (ref 70–99)
GLUCOSE BLDC GLUCOMTR-MCNC: 126 MG/DL (ref 70–99)
GLUCOSE BLDC GLUCOMTR-MCNC: 136 MG/DL (ref 70–99)
GLUCOSE BLDC GLUCOMTR-MCNC: 140 MG/DL (ref 70–99)
GLUCOSE BLDC GLUCOMTR-MCNC: 149 MG/DL (ref 70–99)
GLUCOSE BLDC GLUCOMTR-MCNC: 191 MG/DL (ref 70–99)
GLUCOSE SERPL-MCNC: 110 MG/DL (ref 70–99)
GLUCOSE SERPL-MCNC: 179 MG/DL (ref 70–99)
GLUCOSE SERPL-MCNC: 99 MG/DL (ref 70–99)
GLUCOSE UR STRIP-MCNC: NEGATIVE MG/DL
HCO3 BLDV-SCNC: 29 MMOL/L (ref 21–28)
HCO3 BLDV-SCNC: 30 MMOL/L (ref 21–28)
HCO3 BLDV-SCNC: 32 MMOL/L (ref 21–28)
HCO3 BLDV-SCNC: 33 MMOL/L (ref 21–28)
HCO3 BLDV-SCNC: 34 MMOL/L (ref 21–28)
HCO3 BLDV-SCNC: 35 MMOL/L (ref 21–28)
HCT VFR BLD AUTO: 26.8 % (ref 40–53)
HGB BLD-MCNC: 8 G/DL (ref 13.3–17.7)
HGB BLD-MCNC: 8 G/DL (ref 13.3–17.7)
HGB BLD-MCNC: 8.1 G/DL (ref 13.3–17.7)
HGB BLD-MCNC: 8.1 G/DL (ref 13.3–17.7)
HGB UR QL STRIP: NEGATIVE
INR PPP: 1.12 (ref 0.86–1.14)
INTERPRETATION ECG - MUSE: NORMAL
KETONES UR STRIP-MCNC: NEGATIVE MG/DL
LACTATE BLD-SCNC: 0.9 MMOL/L (ref 0.7–2.1)
LEUKOCYTE ESTERASE UR QL STRIP: ABNORMAL
LMWH PPP CHRO-ACNC: NORMAL IU/ML
MAGNESIUM SERPL-MCNC: 2.3 MG/DL (ref 1.6–2.3)
MAGNESIUM SERPL-MCNC: 2.6 MG/DL (ref 1.6–2.3)
MCH RBC QN AUTO: 29.9 PG (ref 26.5–33)
MCHC RBC AUTO-ENTMCNC: 30.2 G/DL (ref 31.5–36.5)
MCV RBC AUTO: 99 FL (ref 78–100)
MUCOUS THREADS #/AREA URNS LPF: PRESENT /LPF
NITRATE UR QL: NEGATIVE
O2/TOTAL GAS SETTING VFR VENT: 21 %
O2/TOTAL GAS SETTING VFR VENT: ABNORMAL %
O2/TOTAL GAS SETTING VFR VENT: ABNORMAL %
OXYHGB MFR BLDV: 46 %
OXYHGB MFR BLDV: 50 %
OXYHGB MFR BLDV: 51 %
OXYHGB MFR BLDV: 54 %
OXYHGB MFR BLDV: 56 %
OXYHGB MFR BLDV: 58 %
PCO2 BLDV: 39 MM HG (ref 40–50)
PCO2 BLDV: 39 MM HG (ref 40–50)
PCO2 BLDV: 40 MM HG (ref 40–50)
PCO2 BLDV: 42 MM HG (ref 40–50)
PCO2 BLDV: 43 MM HG (ref 40–50)
PCO2 BLDV: 46 MM HG (ref 40–50)
PH BLDV: 7.48 PH (ref 7.32–7.43)
PH BLDV: 7.48 PH (ref 7.32–7.43)
PH BLDV: 7.49 PH (ref 7.32–7.43)
PH BLDV: 7.49 PH (ref 7.32–7.43)
PH BLDV: 7.5 PH (ref 7.32–7.43)
PH BLDV: 7.51 PH (ref 7.32–7.43)
PH UR STRIP: 8.5 PH (ref 5–7)
PHOSPHATE SERPL-MCNC: 3.8 MG/DL (ref 2.5–4.5)
PLATELET # BLD AUTO: 132 10E9/L (ref 150–450)
PO2 BLDV: 27 MM HG (ref 25–47)
PO2 BLDV: 29 MM HG (ref 25–47)
PO2 BLDV: 30 MM HG (ref 25–47)
PO2 BLDV: 30 MM HG (ref 25–47)
PO2 BLDV: 32 MM HG (ref 25–47)
PO2 BLDV: 33 MM HG (ref 25–47)
POTASSIUM BLD-SCNC: 3.9 MMOL/L (ref 3.4–5.3)
POTASSIUM SERPL-SCNC: 3.7 MMOL/L (ref 3.4–5.3)
POTASSIUM SERPL-SCNC: 3.8 MMOL/L (ref 3.4–5.3)
POTASSIUM SERPL-SCNC: 4 MMOL/L (ref 3.4–5.3)
POTASSIUM SERPL-SCNC: 4.1 MMOL/L (ref 3.4–5.3)
PROT SERPL-MCNC: 6.4 G/DL (ref 6.8–8.8)
RBC # BLD AUTO: 2.71 10E12/L (ref 4.4–5.9)
RBC #/AREA URNS AUTO: 1 /HPF (ref 0–2)
SODIUM SERPL-SCNC: 138 MMOL/L (ref 133–144)
SODIUM SERPL-SCNC: 141 MMOL/L (ref 133–144)
SODIUM SERPL-SCNC: 141 MMOL/L (ref 133–144)
SP GR UR STRIP: 1.01 (ref 1–1.03)
URN SPEC COLLECT METH UR: ABNORMAL
UROBILINOGEN UR STRIP-MCNC: 2 MG/DL (ref 0–2)
WBC # BLD AUTO: 11.6 10E9/L (ref 4–11)
WBC #/AREA URNS AUTO: 5 /HPF (ref 0–2)

## 2017-04-12 PROCEDURE — 87086 URINE CULTURE/COLONY COUNT: CPT | Performed by: INTERNAL MEDICINE

## 2017-04-12 PROCEDURE — 71010 XR CHEST PORT 1 VW: CPT

## 2017-04-12 PROCEDURE — 25000128 H RX IP 250 OP 636: Performed by: INTERNAL MEDICINE

## 2017-04-12 PROCEDURE — 81001 URINALYSIS AUTO W/SCOPE: CPT | Performed by: INTERNAL MEDICINE

## 2017-04-12 PROCEDURE — 87088 URINE BACTERIA CULTURE: CPT | Performed by: INTERNAL MEDICINE

## 2017-04-12 PROCEDURE — 40000048 ZZH STATISTIC DAILY SWAN MONITORING

## 2017-04-12 PROCEDURE — 82330 ASSAY OF CALCIUM: CPT | Performed by: INTERNAL MEDICINE

## 2017-04-12 PROCEDURE — 25000132 ZZH RX MED GY IP 250 OP 250 PS 637

## 2017-04-12 PROCEDURE — 85027 COMPLETE CBC AUTOMATED: CPT | Performed by: INTERNAL MEDICINE

## 2017-04-12 PROCEDURE — 83735 ASSAY OF MAGNESIUM: CPT | Performed by: INTERNAL MEDICINE

## 2017-04-12 PROCEDURE — 85018 HEMOGLOBIN: CPT | Performed by: INTERNAL MEDICINE

## 2017-04-12 PROCEDURE — 84132 ASSAY OF SERUM POTASSIUM: CPT | Performed by: INTERNAL MEDICINE

## 2017-04-12 PROCEDURE — 00000146 ZZHCL STATISTIC GLUCOSE BY METER IP

## 2017-04-12 PROCEDURE — 99291 CRITICAL CARE FIRST HOUR: CPT | Mod: GC | Performed by: INTERNAL MEDICINE

## 2017-04-12 PROCEDURE — 40000115 ZZH STATISTIC NURSE TLC VISIT: Performed by: NURSE PRACTITIONER

## 2017-04-12 PROCEDURE — 25000132 ZZH RX MED GY IP 250 OP 250 PS 637: Performed by: STUDENT IN AN ORGANIZED HEALTH CARE EDUCATION/TRAINING PROGRAM

## 2017-04-12 PROCEDURE — 36415 COLL VENOUS BLD VENIPUNCTURE: CPT | Performed by: INTERNAL MEDICINE

## 2017-04-12 PROCEDURE — 80048 BASIC METABOLIC PNL TOTAL CA: CPT

## 2017-04-12 PROCEDURE — 85610 PROTHROMBIN TIME: CPT | Performed by: INTERNAL MEDICINE

## 2017-04-12 PROCEDURE — 83605 ASSAY OF LACTIC ACID: CPT | Performed by: INTERNAL MEDICINE

## 2017-04-12 PROCEDURE — 87186 SC STD MICRODIL/AGAR DIL: CPT | Performed by: INTERNAL MEDICINE

## 2017-04-12 PROCEDURE — 99207 ZZC CDG-CORRECTLY CODED, REVIEWED AND AGREE: CPT | Performed by: NURSE PRACTITIONER

## 2017-04-12 PROCEDURE — 40000076 ZZH STATISTIC IABP MONITORING

## 2017-04-12 PROCEDURE — 25000125 ZZHC RX 250: Performed by: INTERNAL MEDICINE

## 2017-04-12 PROCEDURE — 20000004 ZZH R&B ICU UMMC

## 2017-04-12 PROCEDURE — 40000275 ZZH STATISTIC RCP TIME EA 10 MIN

## 2017-04-12 PROCEDURE — 25000132 ZZH RX MED GY IP 250 OP 250 PS 637: Performed by: INTERNAL MEDICINE

## 2017-04-12 PROCEDURE — S0171 BUMETANIDE 0.5 MG: HCPCS

## 2017-04-12 PROCEDURE — 99223 1ST HOSP IP/OBS HIGH 75: CPT | Performed by: NURSE PRACTITIONER

## 2017-04-12 PROCEDURE — 80076 HEPATIC FUNCTION PANEL: CPT | Performed by: INTERNAL MEDICINE

## 2017-04-12 PROCEDURE — 87040 BLOOD CULTURE FOR BACTERIA: CPT | Performed by: INTERNAL MEDICINE

## 2017-04-12 PROCEDURE — 84100 ASSAY OF PHOSPHORUS: CPT | Performed by: INTERNAL MEDICINE

## 2017-04-12 PROCEDURE — 40000196 ZZH STATISTIC RAPCV CVP MONITORING

## 2017-04-12 PROCEDURE — 85520 HEPARIN ASSAY: CPT | Performed by: INTERNAL MEDICINE

## 2017-04-12 PROCEDURE — 25000125 ZZHC RX 250

## 2017-04-12 PROCEDURE — 25000125 ZZHC RX 250: Performed by: STUDENT IN AN ORGANIZED HEALTH CARE EDUCATION/TRAINING PROGRAM

## 2017-04-12 PROCEDURE — 82805 BLOOD GASES W/O2 SATURATION: CPT | Performed by: INTERNAL MEDICINE

## 2017-04-12 PROCEDURE — 27210437 ZZH NUTRITION PRODUCT SEMIELEM INTERMED LITER

## 2017-04-12 PROCEDURE — 80048 BASIC METABOLIC PNL TOTAL CA: CPT | Performed by: INTERNAL MEDICINE

## 2017-04-12 RX ORDER — BUMETANIDE 0.25 MG/ML
2 INJECTION INTRAMUSCULAR; INTRAVENOUS ONCE
Status: COMPLETED | OUTPATIENT
Start: 2017-04-12 | End: 2017-04-12

## 2017-04-12 RX ADMIN — HYDRALAZINE HYDROCHLORIDE 75 MG: 50 TABLET ORAL at 20:26

## 2017-04-12 RX ADMIN — BUMETANIDE 2 MG: 0.25 INJECTION, SOLUTION INTRAMUSCULAR; INTRAVENOUS at 08:46

## 2017-04-12 RX ADMIN — HUMAN INSULIN 1 UNITS/HR: 100 INJECTION, SOLUTION SUBCUTANEOUS at 14:18

## 2017-04-12 RX ADMIN — BUMETANIDE 2 MG: 0.25 INJECTION, SOLUTION INTRAMUSCULAR; INTRAVENOUS at 19:35

## 2017-04-12 RX ADMIN — POTASSIUM CHLORIDE 20 MEQ: 29.8 INJECTION, SOLUTION INTRAVENOUS at 15:00

## 2017-04-12 RX ADMIN — HYDRALAZINE HYDROCHLORIDE 75 MG: 50 TABLET ORAL at 02:33

## 2017-04-12 RX ADMIN — POTASSIUM CHLORIDE 20 MEQ: 1.5 POWDER, FOR SOLUTION ORAL at 21:10

## 2017-04-12 RX ADMIN — DOBUTAMINE 5 MCG/KG/MIN: 12.5 INJECTION, SOLUTION, CONCENTRATE INTRAVENOUS at 14:17

## 2017-04-12 RX ADMIN — HYDRALAZINE HYDROCHLORIDE 75 MG: 50 TABLET ORAL at 14:17

## 2017-04-12 RX ADMIN — POTASSIUM CHLORIDE 20 MEQ: 1.5 POWDER, FOR SOLUTION ORAL at 05:05

## 2017-04-12 RX ADMIN — ASPIRIN 81 MG CHEWABLE TABLET 81 MG: 81 TABLET CHEWABLE at 08:45

## 2017-04-12 RX ADMIN — POTASSIUM CHLORIDE 20 MEQ: 29.8 INJECTION, SOLUTION INTRAVENOUS at 10:40

## 2017-04-12 RX ADMIN — CLOPIDOGREL 75 MG: 75 TABLET, FILM COATED ORAL at 08:46

## 2017-04-12 RX ADMIN — PANTOPRAZOLE SODIUM 40 MG: 40 INJECTION, POWDER, FOR SOLUTION INTRAVENOUS at 08:46

## 2017-04-12 RX ADMIN — HYDRALAZINE HYDROCHLORIDE 75 MG: 50 TABLET ORAL at 08:46

## 2017-04-12 RX ADMIN — QUETIAPINE FUMARATE 25 MG: 25 TABLET, FILM COATED ORAL at 21:04

## 2017-04-12 RX ADMIN — PANTOPRAZOLE SODIUM 40 MG: 40 INJECTION, POWDER, FOR SOLUTION INTRAVENOUS at 20:26

## 2017-04-12 RX ADMIN — ACETAMINOPHEN 325 MG: 325 TABLET, FILM COATED ORAL at 21:04

## 2017-04-12 RX ADMIN — MULTIVIT AND MINERALS-FERROUS GLUCONATE 9 MG IRON/15 ML ORAL LIQUID 15 ML: at 08:46

## 2017-04-12 NOTE — CONSULTS
CVTS Consultation Note:    I was asked by Dr. Jean-Baptiste to see patient in consultation of surgical treatment of LVAD support due to unstable hemodynamics.    Chief Complaint       Back pain, flu-like symptoms, weakness, SOB     History of Present Illness    Luke Henao is a 51 year old male with diabetes who presented yesterday evening to [facility] via EMS for two days of flu-like illness with vomiting, back and shoulder pain, and new weakness. EMS identified an inferior STEMI en route. BP intitially 88/57mmHg and pt noted to be bradycardic. At the [facility] ED, he was given 2L NS, 180mg brilinta, full dose ASA and was in the cath lab within 30 min. Angiogram at [facility] revealed severely disease LAD with possibe  of LCX. RCA with 100% thrombolic occlusion. Aspiration thrombectomy performed and POBA in prox and mid RCA reinstated TIMI3 flow. No stent deployed at that time due to need for CABG.     Procedure complicated by complete heart block with HR 30 requiring temporary pacemaker placement set at 80bpm.  An IABP was placed in the cath lab. During the procedure he became altered and was intubated for airway protection and to allow for vascular access. He was evaluated by the surgical team at [facility] who deamed him a poor candidate for CAB given poor targets and overall clinical picture.      Pt was transferred to [facility] for further evaluation for mechanical support. He went to the cath lab where 7 stents were placed (3 to RCA, 2 to circ, 1 to LAD). He was loaded with plavix and continued on dopamine (started at [facility]) and also started on epinephrine.     Patient's IABP was once weaned and removed.  But was reinserted due to worsening hemodynamics and Cr. At present patient is stable with improved renal function on IABP.       Past Medical History       No past medical history on file.   H/O diabetes per [facility] notes        Past Surgical History       No past surgical history on file.        Prior to  "Admission Medications       Prior to Admission Medications   Prescriptions Last Dose Informant Patient Reported? Taking?   ELOCON 0.1 % EX CREA     No No   Sig: apply to bug bites qd-bid prn   OCUFLOX 0.3 % OP SOLN     No No   Si-2 gtts in eye q 4-6 hrs        Facility-Administered Medications: None         Allergies       No Known Allergies        Social History       Per chart review he has been smoking Cigarettes. He has been smoking about 0.50 packs per day. He does not have any smokeless tobacco history on file.        Family History       No family history on file.        Review of Systems       Unable to assess 2/2 sedation      Physical Exam      Vitals: BP 91/55  Temp 98.9  F (37.2  C) (Oral)  Resp 16  Ht 1.575 m (5' 2.01\")  Wt 56.2 kg (123 lb 14.4 oz)  SpO2 97%  BMI 22.66 kg/m2  BMI= Body mass index is 22.66 kg/(m^2).  FiO2 (%): [40 %-50 %] 40 %  SpO2: [98 %-100 %] 100 %  0 lbs 0 oz    Alert and intermitenly confused., moves 4 extremities.   CVS: balloon pump murmur audible throughout precordium   No pedal edema  Resp: coarse breath sound in bilateral anterior fields, breathing with the vent  Abd: soft, slightly distended, NABS, IABP audible  Chavis in place with clear yellow urine, femoral central venous line in place.     Recent Labs   Lab Test  17   0746  17   0352  175   NA   --   141  143   POTASSIUM  3.9  3.7  4.1   CHLORIDE   --   102  102   CO2   --   31  34*   ANIONGAP   --   7  7   GLC   --   110*  99   BUN   --   46*  50*   CR   --   1.50*  1.44*   ROB   --   7.8*  7.9*     Lab Results   Component Value Date    WBC 11.6 2017     Lab Results   Component Value Date    RBC 2.71 2017     Lab Results   Component Value Date    HGB 8.1 2017     Lab Results   Component Value Date    HCT 26.8 2017     No components found for: MCT  Lab Results   Component Value Date    MCV 99 2017     Lab Results   Component Value Date    MCH 29.9 2017 "     Lab Results   Component Value Date    MCHC 30.2 04/12/2017     Lab Results   Component Value Date    RDW 18.2 04/12/2017     Lab Results   Component Value Date     04/12/2017       Assessment and plan:    Luke Henao is a 51 year old male smoker with diabetes who was transferred from [facility] in cardiogenic shock after sustaining an inferior wall STEMI.  He had 7 stents to LAD, circ, RCA (angiogram report pending).     ECHO: decreased LV systolic function.    Now with IABP, Now appears dependant on IABP.    Plan:    1. Explained to patient's family about the need for possible LVAD support as bridge to Tx or Decision.  2. Social work evaluation regarding the family support.  3. Try to wean IABP again, if fails, will consider LVAD this week or early next week.

## 2017-04-12 NOTE — PLAN OF CARE
Problem: Goal Outcome Summary  Goal: Goal Outcome Summary  Outcome: No Change  Patient is disoriented to time and situation.  Sitter at bedside to ensure patient doesn't move LE with IABP.  Pupils equal and reactive; follows commands.  2 L nasal canula. -115. MAPs 65-85. IABP in place; 1:1, 100% augmentation. CVP 8-11, PA 28-34/18-24. SVO2 43-52. CI 1.8-2.2. Patient had multiple bloody (thor) stools. Hgb 9.5>8.5>8.5. Had abdominal CT. Heparin stopped. NPO. Dobutamine at 5, insulin at 1. UOP 75-175ml/hr. Will continue to monitor and notify MD of any changes.

## 2017-04-12 NOTE — PROGRESS NOTES
"[facility]  Cardiology II Service / Advanced Heart Failure  Daily Note        Interval History:   - Has had 5 thor BMs over the last 24 hours, Hb trickling down, heparin inf held, made NPO and consulted GI  - Tolerated Hydral 75 q6  - CVP and PCWP slowly climbing  - Mentation looks more clear this AM.  - More restful last night, after seroquel  - Tn down trending from 4/9, EKG shows evolving MI        Pertinent Medications:  I have reviewed this patient's current medications      QUEtiapine  25 mg Oral At Bedtime     pantoprazole  40 mg Intravenous BID     hydrALAZINE  75 mg Oral Q6H     pneumococcal vaccine  0.5 mL Intramuscular Once     polyethylene glycol  17 g Oral Daily     sodium chloride (PF)  3 mL Intracatheter Q8H     multivitamins with minerals  15 mL Per Feeding Tube Daily     senna-docusate  1-2 tablet Oral or NG Tube BID     clopidogrel  75 mg Oral or NG Tube Daily     aspirin  81 mg Oral or Feeding Tube Daily         Examination:  BP 95/56  Temp 98.7  F (37.1  C) (Oral)  Resp 16  Ht 1.575 m (5' 2.01\")  Wt 56.2 kg (123 lb 14.4 oz)  SpO2 98%  BMI 22.66 kg/m2  GEN: A, cooperative and conversational   NECK: can not assess JVP   RESP: crackles   CV: S1S2S3, holossystolic murmur at the apex  ABD: Soft, nontender to palpation, no HSM, +BS, no guarding  EXT: No peripheral edema, warm/well perfused   NEURO: CN II-XII intact, normal 5/5 strength in all extremitites  SKIN: Normal skin turgor, no rash on limited exam    Data:  CMP  Recent Labs  Lab 04/12/17  0746 04/12/17  0352 04/11/17  1955 04/11/17  1227  04/11/17  0254 04/10/17  1615  04/10/17  0301  04/09/17  0317  04/08/17  0425  04/06/17  2149   NA  --  141 143 141  --  146*  --   < > 144  < > 147*  < > 149*  < >  --    POTASSIUM 3.9 3.7 4.1 4.0  < > 3.2* 3.7  < > 4.1  < > 3.7  < > 4.0  < > 3.1*   CHLORIDE  --  102 102 100  --  101  --   < > 100  < > 102  < > 106  < >  --    CO2  --  31 34* 36*  --  37*  --   < " > 37*  < > 37*  < > 37*  < >  --    ANIONGAP  --  7 7 5  --  8  --   < > 7  < > 8  < > 7  < >  --    GLC  --  110* 99 115*  --  100*  --   < > 157*  < > 144*  < > 155*  < >  --    BUN  --  46* 50* 57*  --  62*  --   < > 70*  < > 83*  < > 92*  < >  --    CR  --  1.50* 1.44* 1.54*  --  1.71*  --   < > 1.81*  < > 2.23*  < > 2.84*  < >  --    GFRESTIMATED  --  50* 52* 48*  --  43*  --   < > 40*  < > 31*  < > 24*  < >  --    GFRESTBLACK  --  60* 63 58*  --  52*  --   < > 48*  < > 38*  < > 29*  < >  --    ROB  --  7.8* 7.9* 8.4*  --  8.4*  --   < > 7.8*  < > 7.4*  < > 7.3*  < >  --    MAG  --  2.6*  --  2.5*  --  2.6* 2.5*  --  2.6*  < > 2.5*  < > 2.5*  < > 3.1*   PHOS  --  3.8  --   --   --  3.9  --   --   --   --   --   --  3.5  --  5.2*   PROTTOTAL  --  6.4*  --   --   --  6.9  --   --  6.5*  --  6.4*  --  6.3*  < >  --    ALBUMIN  --  2.4*  --   --   --  2.6*  --   --  2.5*  --  2.4*  --  2.4*  < >  --    BILITOTAL  --  1.0  --   --   --  0.7  --   --  0.9  --  0.8  --  0.8  < >  --    ALKPHOS  --  69  --   --   --  88  --   --  88  --  89  --  96  < >  --    AST  --  30  --   --   --  47*  --   --  44  --  47*  --  38  < >  --    ALT  --  83*  --   --   --  116*  --   --  129*  --  169*  --  215*  < >  --    < > = values in this interval not displayed.  CBC    Recent Labs  Lab 04/12/17  1028 04/12/17  0352 04/11/17  2149 04/11/17  1450  04/11/17  0254 04/10/17  0301 04/09/17  0317   WBC  --  11.6*  --   --   --  12.8* 13.3* 16.2*   RBC  --  2.71*  --   --   --  3.13* 2.91* 3.02*   HGB 8.0* 8.1* 8.5* 8.9*  < > 9.5* 8.7* 9.3*   HCT  --  26.8*  --   --   --  31.3* 29.5* 30.7*   MCV  --  99  --   --   --  100 101* 102*   MCH  --  29.9  --   --   --  30.4 29.9 30.8   MCHC  --  30.2*  --   --   --  30.4* 29.5* 30.3*   RDW  --  18.2*  --   --   --  18.2* 18.0* 17.7*   PLT  --  132*  --   --   --  174 175 180   < > = values in this interval not displayed.  INR    Recent Labs  Lab 04/12/17  0352 04/11/17  0254 04/10/17  0301  04/09/17  0317   INR 1.12 1.07 1.12 1.19*     Arterial Blood Gas  Recent Labs  Lab 04/12/17  0746 04/12/17  0352 04/11/17  2352 04/11/17  1955  04/06/17  0620   PH  --   --   --   --   --  7.46*   PCO2  --   --   --   --   --  45   PO2  --   --   --   --   --  97   HCO3  --   --   --   --   --  32*   O2PER 21 2L 2L 2L  < > 2L   < > = values in this interval not displayed.    Imaging Studies:  Echo 3/27  The visual ejection fraction is estimated at 30-35%.  There is extensive inferior, inferoseptal, and inferolateral wall akinesis.  Basal/mid lateral wall hypokinesis  There is moderate to severe septal hypokinesis.  Septal wall motion abnormality may reflect pacemaker activation.  There is a catheter/pacemaker lead seen in the right ventricle.  The right ventricle is mildly dilated.  Severely decreased right ventricular systolic function  There is no pericardial effusion.  The rhythm was paced.  Compared to the prior study, the LVEF appears somewhat decreased. Septal wall  motion abnormality larger- may reflect, in part, that patient in sinus tach on  prior study, and ventricular paced now. No hemodynamically significant  valvular abnormalities on 2D or color flow imaging     CT chest/abdomen 3/27   IMPRESSION:   1. Small mediastinal hemorrhage, small bilateral pleural effusions  which may be hemorrhagic. Small intraperitoneal hemorrhage. Minimal  pericardial hemorrhage.  2. Minimal pneumoperitoneum in the left paracolic gutter, of unknown  significance. No pneumatosis as clinically questioned.  3. IABP slightly low in position.  4. Bilateral pulmonary dependent consolidations represent compression  atelectasis, however differentials include aspiration.     Cath 3/26 [facility]  SUMMARY:   --Inferior STEMI w/ late presentation. Culprit dictated by complete  heart block, and cardiogenic shock. Emergent echo in the Cath Lab also  shows significant RV dysfunction.  --Three vessel coronary artery disease with  diffuse coronary disease  out to the distal vessels  --Successful POBA of the prox and mid-RCA. Stent was not placed, in  anticipation he may need to be considered for bypass surgery in the  near future  --Insertion of a temporary pacemaker for bradycardia (AVB) and  hypotension  --Insertion of a IABP  --Upper GI bleed consistent with [place]     Cath [place] 3/27     CT head 3/28: normal      Echo 3/29  Interpretation Summary  Limited study. Ischemic CM. Moderately (EF 30-35%) reduced left ventricular  function is present. Traced at 32%.  Posterior wall akinesis is present. Lateral wall akinesis is present. Inferior  wall akinesis is present.  Right ventricular function, chamber size, wall motion, and thickness are  normal.  Dilation of the inferior vena cava is present with abnormal respiratory  variation in diameter.     Compared to prior study, RV fxn is improved.     Echo 3/31  Left Ventricle  The Ejection Fraction is estimated at 50-55%. Inferior,posterior,lateral,basal  septal wall akinesis as reported before. No change.     CORONARY ANGIOGRAM 4/1:   1. Both coronary arteries arise from their respective cusps.  2. Right dominant.  3. LM has mild luminal irregularities.   4. LAD supplies the apex along with the RCA (type 2 LAD) and gives rise to septal perforators and a large and branching D1. There are widely patent stents extending from the proximal to mid LAD. The dLAD has mild to moderate disease to 30% stenosis. The D1 is jailed by the LAD stents, but has EBER 3 flow with disease to 30% stenosis.   5. The small ramus is no longer present.  6. LCX is occluded at the ostium.   7. RCA gives rise to PL branches and supplies PDA. There are widely patent stents extending from the proximal to mid RCA. The remainder of the mRCA has disease to 50% stenosis and the dRCA has disease to 10% stenosis. The RPDA has a widely patent stent and the remainder of the artery has mild disease to 10% stenosis. The RPLA  has small vessels with diffuse disease including a distal branch occlusion. The RPLA supplies some small collateral branches to the dLCx.      COMPLICATIONS:  1. None      SUMMARY:   1. Cardiogenic shock following an inferior STEMI.  2. The LCx re-occlusion is not surprising as the recently revascularized LCx  had poor outflow and the revascularized portion did not supply a large territory (limited to no flow was present distal to the stented segment immediately following stenting). Repeat revascularization of the LCx would result in the same outcome of repeat closure and also risk embolizing thrombus from the LCx into the LAD so no treatment of the occluded LCx was performed.   3. Three vessel coronary artery disease with patent LAD and RCA stents and an occluded LCx.     Echo 4/3  Left ventricular size is normal.  The Ejection Fraction is estimated at 35-40%.  Inferior wall akinesis is present.  Lateral wall akinesis is present.  Posterior wall akinesis is present.  Right ventricular function, chamber size, wall motion, and thickness are  normal.  Moderate mitral insufficiency is present.  Moderate tricuspid insufficiency is present.  Right ventricular systolic pressure is 47mmHg above the right atrial pressure.  The inferior vena cava is normal.     Echo 4/5  Moderately (EF 35-40%) reduced left ventricular function is present. Traced at  40%.  Moderate mitral insufficiency is present.  The cause of the mitral insufficiency appears to be restricted posterior  leaflet from posterior MI. No mitral leaflet pathology seen.  Dilation of the inferior vena cava is present with abnormal respiratory  variation in diameter.     CT abd without contrast 4/4  IMPRESSION:   1. No evidence of ischemic bowel, obstruction, or intra-abdominal  infection.  2. Bibasilar patchy pulmonary opacities likely represent combination  of aspiration and atelectasis.  3. Small amount of simple free fluid within the lower abdomen.  4. Small  left retroperitoneal hematoma along the iliac vasculature  likely postprocedural.   5. Unchanged size of bilateral pleural effusions, which now consist of  simple fluid.    Assessment and Plan  51 year old man presented with cardiogenic shock from inferior STEMI s/p RCA aspiration thrombectomy and POBA on 3/26 at [facility] s/p IABP and initiation of Dopamine, also during procedure, CHB s/p temp pacer, emesis/hematmesis and altered mental status s/p intubation transferred here for consideration of mechanical support on 3/27. Had PCI to RCA, LAD and LCx (on ASA and plavix) started on epinephrine in addition to dopamine, post procedure developed distributive shock picture from infection (aspiration pneumonia? Sputum cx growing staph aureus, on vanco and zosyn) vs post-MI SIRS response. Currently in cardiogenic shock with IABP in situ    Card  # Cardiogenic shock 4/3- from underlying 3v CAD-has had high SVR and low CI, complicated by ischemic MR  # Due to inferior wall STEMI. S/P 7 stents to LAD, circ, RCA (angiogram report pending). Now with IABP, temporary pacemaker after 3rd deg heart block in cath lab at [facility] on 3/26   # Small pericardial hemorrhage   - IABP in place  - Hydral 75 mg q6H, once his SCr improves will consider starting Nipride for a more stable afterload reduction regimen, and give him an IABP wean trial  - Hold off on isordil for now  - LVAD work up given SCr improving  - Plan to make 1L net negative today      # Neuro  - alert, follows commands  - Intermittently confused, no focal deficits, WBC down trending  - Seroquel 25 mg         Respiratory  #Intubated for airway protection due to mental status and emesis on 3/26, extubated 4/3  #Probable Aspiration PNA/pneumonitis vs MSSA pneumonia  #Small mediastinal hemorrhage         # ID  - fever on 4/3-type 2 MI vs infection (unclear source)  - Sepsis from aspiration pneumonia/pneumonitis vs MSSA pneumonia vs post-MI SIRS response initially on 3/28  -  repeat cultures if febrile  - Currently off antibiotics. He was on Unasyn. Switched to vanco and zosyn (4/3 - 4/7) for fever and elevated lactate concern for sepsis         # Endocrine  T2DM: HA1C 7.5 on admission. BG climibing to 170s since admission. Will likely continue to rise with steroids.   - Insulin gtt per nurising protocol with hypoglycemia precautions.       # GI  - Had several maroon BMs on 4/11, Hb slowly drifting down, heparin inf held and GI consulted  - Will monitor Hb  - Consider scope if hemodynamically unstable, or if his Hb continues to drop     Minimal pneumoperitoneum, unclear etiology: Seen by General surgery.  - had bloody NG output earlier in hospitalization. Was on PPI BID switched to once daily 4/3   - Conservative Mx for now        # Renal/  Renal function slightly improved- from hypoperfusion from cardiogenic shock 4/5 plus central venous congestion plus contrast nephropathy  -Diuresis  - Daily Cr  - Avoid nephrotoxins as able  - Plan to make 1L net negative today     -Hypernatremia  - Stopped his D5W, only getting FWF's.               FEN: feeding tube  Code: FULL  PPx: PPI 40mg daily, senna PRN  Dispo: pending stability        Plan of care discussed with Dr. Estiven Loving MD  Cardiovascular Disease Fellow  Pager: [phone]    I have reviewed today's vital signs, notes, medications, labs and imaging.  I have also seen and examined the patient and agree with the findings and plan as outlined above.  Pt with IABP 1:1 with  and BP 96/52, CVP 12 and CI 2.2 on Dbx 5 and 3.1 L UO.  LUngs clear and S1 and S2 with loud IV?VI HSM.  Abd is benign.  Labs with Cr 1.5 and Hgb 8.1.  Assessment: Pt with cardiogenic shock, S/P IWMI with MR on IABP and GI bleed (but no recent stool).  COntinue to hold herparin and follow.  Renal fn approaching baseline.  WIll continue LVAD workup.  Total critical care time 30 min.     Vikram Jean-Baptiste MD, PhD  Professor, Heart Failure and Cardiac  Transplantation  [facility]

## 2017-04-12 NOTE — PLAN OF CARE
Problem: Goal Outcome Summary  Goal: Goal Outcome Summary  Outcome: No Change  Progress Note:  Pt confusion seems to be improving.  Alert and oriented to person, place, situation and frequently time.  IABP remains at 1:1 with 100% augmentation.  Groin site stable.  No further GI bleeding during night.  Dobutamine remains at 5 mcg/kg/min.  Insulin at 1 unit entire night.  TF remains off, fluid flush of 50 ml's per continued.  NPO until GI clears diet orders.  Continue with 1:1 sitter overnight, yet behavior improving.  Progressing towards goals, continue with plan of care.

## 2017-04-12 NOTE — PROGRESS NOTES
GASTROENTEROLOGY LUMINAL PROGRESS NOTE      ASSESSMENT:  51 year old male with DMII, newly diagnosed CAD s/p STEMI on 3/26 and subsequent PCI, balloon pump use complicated by distributive and cardiogenic shock who we are asked to see for maroon stools.      # Gastrointestinal bleeding. Based minimal change in vital signs and hemoglobin, most likely this is a lower source such as mild ischemic colitis or rectal ulcer 2/2 rectal tube placement previously. CT A/P showed small/stable RP hemorrhage, no bowel thickening. There are notes of possible hematemesis on initial admission but he should be more unstable to have maroon stool due to an upper source. No upper symptoms. No abdominal pain. Had been on heparin gtt, ASA, clopidogrel, with heparin stopped 4/11, with subsequent cessation of maroon stools. Hgb stable.        Recommendations  -- advance to CLD today   -- continue excellent supportive cares   -- Ideal to hold heparin for 3 days total to allow for mucosal healing, however can restart if necessary to wean off balloon pump  (would recommend not bolusing if possible)   -- follow hemoglobin, would not check more than every 8 hours unless there is significant hemodynamic change  -- empiric PPI twice daily, transition to oral once able to tolerate PO   -- will follow clinically, hold off on endoscopy for now unless persistent hemodynamically significant bleeding     Gastroenterology follow up recommendations: TBD     Thank you for involving us in this patient's care. Please do not hesitate to contact the GI service with any questions or concerns.      Pt care plan discussed with Dr. Benitez, GI staff physician.     Juan Thomas MD  PGY-1  [phone]  _______________________________________________________________  S: No acute events overnight. Notes he is hungry this AM and would like diet advanced. Denies any f/v, n/v or abdominal pain. Was having multiple maroon stools yesterday however have since stopped shortly  after stopping heparin and making NPO yesterday afternoon. No BM overnight or this AM.     O:  Temp:  [97.9  F (36.6  C)-99.7  F (37.6  C)] 98.4  F (36.9  C)  Heart Rate:  [102-113] 111  Resp:  [14-16] 14  BP: ()/() 117/69  SpO2:  [94 %-100 %] 100 %    Physical Exam  Gen: NAD  CV: RRR, systolic murmer at apex.   Resp: CTAB, no crackles or wheezes.   Abdominal: Soft, non-distended, no TTP, no HSM, +BS.   Extremities: Right groin catheter in place with no bleeding.   Neuro: A&Ox3, moves all four extremities.      LABS:  BMP  Recent Labs  Lab 04/12/17  0746 04/12/17  0352 04/11/17  1955 04/11/17  1227  04/11/17  0254   NA  --  141 143 141  --  146*   POTASSIUM 3.9 3.7 4.1 4.0  < > 3.2*   CHLORIDE  --  102 102 100  --  101   ROB  --  7.8* 7.9* 8.4*  --  8.4*   CO2  --  31 34* 36*  --  37*   BUN  --  46* 50* 57*  --  62*   CR  --  1.50* 1.44* 1.54*  --  1.71*   GLC  --  110* 99 115*  --  100*   < > = values in this interval not displayed.  CBC  Recent Labs  Lab 04/12/17  0352  04/11/17  0254 04/10/17  0301 04/09/17  0317   WBC 11.6*  --  12.8* 13.3* 16.2*   RBC 2.71*  --  3.13* 2.91* 3.02*   HGB 8.1*  < > 9.5* 8.7* 9.3*   HCT 26.8*  --  31.3* 29.5* 30.7*   MCV 99  --  100 101* 102*   MCH 29.9  --  30.4 29.9 30.8   MCHC 30.2*  --  30.4* 29.5* 30.3*   RDW 18.2*  --  18.2* 18.0* 17.7*   *  --  174 175 180   < > = values in this interval not displayed.  INR  Recent Labs  Lab 04/12/17  0352 04/11/17  0254 04/10/17  0301 04/09/17 0317   INR 1.12 1.07 1.12 1.19*     LFTs  Recent Labs  Lab 04/12/17  0352 04/11/17  0254 04/10/17  0301 04/09/17 0317   ALKPHOS 69 88 88 89   AST 30 47* 44 47*   ALT 83* 116* 129* 169*   BILITOTAL 1.0 0.7 0.9 0.8   PROTTOTAL 6.4* 6.9 6.5* 6.4*   ALBUMIN 2.4* 2.6* 2.5* 2.4*      PANCNo lab results found in last 7 days.

## 2017-04-12 NOTE — PLAN OF CARE
Problem: Goal Outcome Summary  Goal: Goal Outcome Summary  ST 4E: Hold- Pt made NPO due to GI bleed. Will defer to medical team to resume PO diet as appropriate and follow for further diet advance once cleared from medical perspective.

## 2017-04-12 NOTE — PLAN OF CARE
Problem: Goal Outcome Summary  Goal: Goal Outcome Summary  Outcome: No Change  Patient is oriented x4 today and cooperative. Pupils equal and reactive. Room air O2 sats %. ST -115. MAPs 66-91. CVP 10, 8, 11. PA 32/22, 32/20, 36/22. CI 2.8, 2.6, 2.3. SVR 6938-5055. SVO2 56, 54, 50. Urine output 110-350ml/hr. No bowel movements today. Patient was NPO this morning then advanced to clear liquid diet. Tube feeds still on hold. Heparin drip off; clarified with Dr. Hunt and plan is to restart heparin drip tomorrow 4/13 d/t GI bleed yesterday.  Afebrile morning and afternoon. This evening at 1745 patient complained of chills; checked temp and it was 100. Notified MD; orders to culture blood and urine. IABP in place. 1:1; 100% augmentation. Potassium replaced per protocol. Family at bedside. Patient is continuing to be worked up for LVAD. Will continue to monitor and notify MD of any changes.

## 2017-04-12 NOTE — CONSULTS
"Patient of Dr. Poornima Hanley seen inpatient for psychosocial assessment.   120 minutes spent with patient. 100% of visit consisted of counseling related to issues surrounding lvad.    Psychosocial Assessment   Name: Luke Henao     MRN:  [id number]        Patient Name / Age / Race: Luke Henao 51 year old  and    Source of Information: Patient and Family (wife and daughter) with [place]ian     : Delia Castro   Interview Date: April 12, 2017   Reason for Referral: Mechanical Circulatory Support     Current Living Situation   Location ([facility]/State): [address] [place], [place] [address] [facility] [id number]   With Whom: Living arrangements - the patient lives with their spouse.   Type of Home: Single family home with 3 stairs to enter.    Working Phone? Yes -landline and cell phone   Three Pronged Outlet? Unsure- family will check. Daughter believes that they are grounded, but will double check.   Distance from Hospital: 10 miles   Need to Relocate Post Surgery? No   Discussed? 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 He loves his work and is anxious to return as soon as possible.    Education: High School in [place]    [place]? No   Income: salary/wages and spouse's income   Insurance: Private Insurance   Name of Private Insurance: [employer]erred One through his employer   Medication Coverage: Preferred One through his employer    In current situation, pt. can afford medication and supply costs:  Yes    Other Financial Concerns/Issues: Earlene's only current concern is if he isn't able to return to work.      Family/Social Support   Marital Status:    Partner Name: Nathaniel Henao   Length of Time : 20 years   Partner s Employment: Full time at [employer]. She works the day shift assembling hearing aids.    Relationship: " "stable/supportive   Children: Earlene has 4 children from his first marriage. He has 2 sons and 2 daughters. 3 of the children live in [place]. 1 daughter (Cantu) lives in [place]. She has been able to be present & reports that her siblings will be taking turns coming to help.    Parents: Earlene's parents live in [place]   Siblings: Earlene has 10 siblings. All of them live in [place]   Other Family or Friends Close by: Nathaniel's 2 children, her brothers, and parents are helpful.    Support System: available, helpful   Primary Support Person: Van and Cantu report that it would be a \"team\" of family helping. They understand that all of them will need to be here for education at the same time.    Issues: None. [place] citizen  will need to be used for all education with patient and wife.      Activities/Functional Ability   Current Level: ambulatory and independent with ADL's   Daily Activities: Earlene reports that he has been able to work full time and manage all of the outside tasks at home prior to his heart attack.    Transportation: self      Medical Status   Patient s understanding of need for surgical intervention: This is unclear. Earlene is agreeable to do what he needs to do to go home. He reports that he was diagnosed with diabetes a few months ago, but didn't follow the doctor's recommendations. He states that he did start a diet and felt that he was doing ok. He does report that now he knows he'll need to be on a lot of medication and states he'll follow medical recommendations.    Advanced Directive? No    Details: He was not intereseted in completing one at this time. He understands that his wife would be his decision maker. He states that he would want family to discuss together.    Adherence History: Unclear- Earlene talked about not following MD orders with his diabetes, but he felt that it was being managed with diet.    Ability to Adhere to Complex Medical Regime: Earlene reports that he understands that he'll be on " "medication and will need to follow the doctor's orders. He reports that this won't be a problem.      Behavioral   Chemical Dependency Issues: No   Earlene reports that after being diagnosed with diabetes he quit drinking beer. He reports that he would drink a couple beers on the weekend. No illicit drug use, no CD treatment and no DUIs.    Smoker? No   He reports that he quit 2 months ago when diagnosed with diabetes.    Psychiatric impairment: No   Coping Style/Strategies: Earlene reports that he \"tries to be happy\". Working helps him. He also really enjoys watching movies.    Recent Legal History: No   Teaching Completed During Assessment Related To Mechanical Circulatory Support: 1. Housing and relocation needs post surgery.  2. Caregiver needs post surgery.  3. Financial issues related to surgery.  4. Risks of alcohol use post surgery.  5. Common psychosocial stressors pre/post surgery.  6. Support group availability.   Psychosocial Risks Reviewed Related To Mechanical Circulatory Support: 1. Increased stress related to your emotional, family, social, employment, or financial situation.  2. Affect on work and/or disability benefits.  3. Affect on future health and life insurance.  4. Outcome expectations may not be met.  5. Mental Health risks: anxiety, depression, PTSD, guilt, grief and chronic fatigue.     Observed Behavior   Well informed? unclear Angry? No   Process info well? unclear Hostile? No   Evasive? No Oriented X3? Yes    Cautious/Suspicious? No Motivated? Yes    Appropriate Behavior? Yes  Depressed? No   Appropriate Affect? Yes  Anxious? No   Interview Observations: Earlene seemed to not have full understanding of lvad. It's unclear if this is due to cognitive issues or language/ cultural differences.      Selection Criteria Met   Plan for Support Yes    Chemical Dependence Yes    Smoking Yes -currently for lvad only   Mental Health Yes    Adequate Finances Yes      Risk Issues:    It's unclear if Earlene isn't " understanding lvad needs and how this would change his life. He continued to talk about just wanting to go home, but then says he knows he needs to have procedure. He also reports that he wasn't taking medication prescribed for his diabetes. This appears to be due to lack of understanding and not non-compliance.     Final Evaluation/Assessment:     Met with Earlene and family with [employer]  two times to complete this evaluation. Earlene appears to have great family support. They state that they'll need to have all of his family take turns helping to care for him, but feel that they'll be able to do this. It was unclear how much Earlene understood about the lvad and how long recovery would take. He continued to discuss wanting to go home, but he was connected to balloon pump and IV inotrope. He appears to have adequate insurance coverage. Wife reports that he has 6 months available to be out of work and continue to get paid. Earlene is wanting to return to work as soon as possible. Earlene reports that he was drinking beer on the weekend, but that he quit when he was diagnosed with diabetes. He also quit smoking at that time. No concerns with illicit drug use, legal issues, or mental health. Earlene does report that he wasn't taking his medications for his diabetes. This appears due to lack of understanding and not due to non-compliance. He states that he understands that he'll need to take medication and will follow the doctor's recommendations.    Patient understands risks and benefits of Mechanical Circulatory Support: Unclear if Earlene understands. This could be due to cultural and language barrier (even though interprerter services was present). Family does seem to understand.    Recommendation:    acceptable    Signature: Delia Castro     Title: Licensed Independent Clinical                Interview Date: April 12, 2017

## 2017-04-13 ENCOUNTER — APPOINTMENT (OUTPATIENT)
Dept: GENERAL RADIOLOGY | Facility: CLINIC | Age: 52
DRG: 228 | End: 2017-04-13
Attending: INTERNAL MEDICINE
Payer: COMMERCIAL

## 2017-04-13 ENCOUNTER — APPOINTMENT (OUTPATIENT)
Dept: CARDIOLOGY | Facility: CLINIC | Age: 52
DRG: 228 | End: 2017-04-13
Payer: COMMERCIAL

## 2017-04-13 ENCOUNTER — OFFICE VISIT (OUTPATIENT)
Dept: INTERPRETER SERVICES | Facility: CLINIC | Age: 52
End: 2017-04-13

## 2017-04-13 ENCOUNTER — APPOINTMENT (OUTPATIENT)
Dept: OCCUPATIONAL THERAPY | Facility: CLINIC | Age: 52
DRG: 228 | End: 2017-04-13
Attending: INTERNAL MEDICINE
Payer: COMMERCIAL

## 2017-04-13 ENCOUNTER — DOCUMENTATION ONLY (OUTPATIENT)
Dept: CARDIOLOGY | Facility: CLINIC | Age: 52
End: 2017-04-13

## 2017-04-13 LAB
A* LOCUS: NORMAL
A*: NORMAL
ABTEST METHOD: NORMAL
ALBUMIN SERPL-MCNC: 2.5 G/DL (ref 3.4–5)
ALP SERPL-CCNC: 64 U/L (ref 40–150)
ALT SERPL W P-5'-P-CCNC: 72 U/L (ref 0–70)
ANION GAP SERPL CALCULATED.3IONS-SCNC: 10 MMOL/L (ref 3–14)
ANION GAP SERPL CALCULATED.3IONS-SCNC: 10 MMOL/L (ref 3–14)
ANION GAP SERPL CALCULATED.3IONS-SCNC: 9 MMOL/L (ref 3–14)
AST SERPL W P-5'-P-CCNC: 30 U/L (ref 0–45)
B* LOCUS: NORMAL
B*: NORMAL
BASE DEFICIT BLDV-SCNC: 0.7 MMOL/L
BASE EXCESS BLDV CALC-SCNC: 0.3 MMOL/L
BASE EXCESS BLDV CALC-SCNC: 1.5 MMOL/L
BASE EXCESS BLDV CALC-SCNC: 1.7 MMOL/L
BASE EXCESS BLDV CALC-SCNC: 3.2 MMOL/L
BASE EXCESS BLDV CALC-SCNC: 4 MMOL/L
BASE EXCESS BLDV CALC-SCNC: 4.1 MMOL/L
BASE EXCESS BLDV CALC-SCNC: 4.6 MMOL/L
BASE EXCESS BLDV CALC-SCNC: 6.3 MMOL/L
BILIRUB DIRECT SERPL-MCNC: 0.5 MG/DL (ref 0–0.2)
BILIRUB SERPL-MCNC: 1.1 MG/DL (ref 0.2–1.3)
BUN SERPL-MCNC: 31 MG/DL (ref 7–30)
BUN SERPL-MCNC: 32 MG/DL (ref 7–30)
BUN SERPL-MCNC: 35 MG/DL (ref 7–30)
BW-1: NORMAL
BW-2: NORMAL
C* LOCUS: NORMAL
C*: NORMAL
CA-I BLD-MCNC: 4.2 MG/DL (ref 4.4–5.2)
CA-I BLD-MCNC: 4.3 MG/DL (ref 4.4–5.2)
CALCIUM SERPL-MCNC: 7.3 MG/DL (ref 8.5–10.1)
CALCIUM SERPL-MCNC: 7.4 MG/DL (ref 8.5–10.1)
CALCIUM SERPL-MCNC: 7.5 MG/DL (ref 8.5–10.1)
CARDIOLIPIN ANTIBODY IGG: NORMAL GPL-U/ML (ref 0–19.9)
CARDIOLIPIN ANTIBODY IGM: 0.4 MPL-U/ML (ref 0–19.9)
CHLORIDE SERPL-SCNC: 106 MMOL/L (ref 94–109)
CHLORIDE SERPL-SCNC: 106 MMOL/L (ref 94–109)
CHLORIDE SERPL-SCNC: 108 MMOL/L (ref 94–109)
CO2 SERPL-SCNC: 22 MMOL/L (ref 20–32)
CO2 SERPL-SCNC: 24 MMOL/L (ref 20–32)
CO2 SERPL-SCNC: 26 MMOL/L (ref 20–32)
CREAT SERPL-MCNC: 1.34 MG/DL (ref 0.66–1.25)
CREAT SERPL-MCNC: 1.39 MG/DL (ref 0.66–1.25)
CREAT SERPL-MCNC: 1.42 MG/DL (ref 0.66–1.25)
DPA1*: NORMAL
DPA1*LOCUS: NORMAL
DPB1*: NORMAL
DPB1*LOCUS: NORMAL
DQA1*LOCUS: NORMAL
DQB1* LOCUS: NORMAL
DRB1* LOCUS: NORMAL
DRB4* LOCUS: NORMAL
DRSSO TEST METHOD: NORMAL
ERYTHROCYTE [DISTWIDTH] IN BLOOD BY AUTOMATED COUNT: 18 % (ref 10–15)
ERYTHROCYTE [DISTWIDTH] IN BLOOD BY AUTOMATED COUNT: 18.2 % (ref 10–15)
GFR SERPL CREATININE-BSD FRML MDRD: 53 ML/MIN/1.7M2
GFR SERPL CREATININE-BSD FRML MDRD: 54 ML/MIN/1.7M2
GFR SERPL CREATININE-BSD FRML MDRD: 56 ML/MIN/1.7M2
GLUCOSE BLDC GLUCOMTR-MCNC: 102 MG/DL (ref 70–99)
GLUCOSE BLDC GLUCOMTR-MCNC: 102 MG/DL (ref 70–99)
GLUCOSE BLDC GLUCOMTR-MCNC: 110 MG/DL (ref 70–99)
GLUCOSE BLDC GLUCOMTR-MCNC: 111 MG/DL (ref 70–99)
GLUCOSE BLDC GLUCOMTR-MCNC: 113 MG/DL (ref 70–99)
GLUCOSE BLDC GLUCOMTR-MCNC: 118 MG/DL (ref 70–99)
GLUCOSE BLDC GLUCOMTR-MCNC: 126 MG/DL (ref 70–99)
GLUCOSE BLDC GLUCOMTR-MCNC: 127 MG/DL (ref 70–99)
GLUCOSE BLDC GLUCOMTR-MCNC: 132 MG/DL (ref 70–99)
GLUCOSE BLDC GLUCOMTR-MCNC: 141 MG/DL (ref 70–99)
GLUCOSE BLDC GLUCOMTR-MCNC: 164 MG/DL (ref 70–99)
GLUCOSE BLDC GLUCOMTR-MCNC: 168 MG/DL (ref 70–99)
GLUCOSE BLDC GLUCOMTR-MCNC: 212 MG/DL (ref 70–99)
GLUCOSE BLDC GLUCOMTR-MCNC: 85 MG/DL (ref 70–99)
GLUCOSE SERPL-MCNC: 118 MG/DL (ref 70–99)
GLUCOSE SERPL-MCNC: 118 MG/DL (ref 70–99)
GLUCOSE SERPL-MCNC: 160 MG/DL (ref 70–99)
HCO3 BLDV-SCNC: 24 MMOL/L (ref 21–28)
HCO3 BLDV-SCNC: 24 MMOL/L (ref 21–28)
HCO3 BLDV-SCNC: 25 MMOL/L (ref 21–28)
HCO3 BLDV-SCNC: 26 MMOL/L (ref 21–28)
HCO3 BLDV-SCNC: 27 MMOL/L (ref 21–28)
HCO3 BLDV-SCNC: 28 MMOL/L (ref 21–28)
HCO3 BLDV-SCNC: 28 MMOL/L (ref 21–28)
HCO3 BLDV-SCNC: 29 MMOL/L (ref 21–28)
HCO3 BLDV-SCNC: 30 MMOL/L (ref 21–28)
HCT VFR BLD AUTO: 25.5 % (ref 40–53)
HCT VFR BLD AUTO: 27.2 % (ref 40–53)
HGB BLD-MCNC: 7.6 G/DL (ref 13.3–17.7)
HGB BLD-MCNC: 8.2 G/DL (ref 13.3–17.7)
HGB BLD-MCNC: 8.4 G/DL (ref 13.3–17.7)
INTERPRETATION ECG - MUSE: NORMAL
LACTATE BLD-SCNC: 0.8 MMOL/L (ref 0.7–2.1)
LACTATE BLD-SCNC: 0.9 MMOL/L (ref 0.7–2.1)
LACTATE BLD-SCNC: 2.2 MMOL/L (ref 0.7–2.1)
LMWH PPP CHRO-ACNC: NORMAL IU/ML
MAGNESIUM SERPL-MCNC: 2.2 MG/DL (ref 1.6–2.3)
MAGNESIUM SERPL-MCNC: 2.2 MG/DL (ref 1.6–2.3)
MAGNESIUM SERPL-MCNC: 2.3 MG/DL (ref 1.6–2.3)
MAGNESIUM SERPL-MCNC: 2.3 MG/DL (ref 1.6–2.3)
MCH RBC QN AUTO: 29.9 PG (ref 26.5–33)
MCH RBC QN AUTO: 30.5 PG (ref 26.5–33)
MCHC RBC AUTO-ENTMCNC: 29.8 G/DL (ref 31.5–36.5)
MCHC RBC AUTO-ENTMCNC: 30.9 G/DL (ref 31.5–36.5)
MCV RBC AUTO: 100 FL (ref 78–100)
MCV RBC AUTO: 99 FL (ref 78–100)
O2/TOTAL GAS SETTING VFR VENT: 21 %
O2/TOTAL GAS SETTING VFR VENT: ABNORMAL %
OXYHGB MFR BLDV: 46 %
OXYHGB MFR BLDV: 51 %
OXYHGB MFR BLDV: 53 %
OXYHGB MFR BLDV: 54 %
OXYHGB MFR BLDV: 55 %
OXYHGB MFR BLDV: 57 %
OXYHGB MFR BLDV: 57 %
PCO2 BLDV: 34 MM HG (ref 40–50)
PCO2 BLDV: 35 MM HG (ref 40–50)
PCO2 BLDV: 35 MM HG (ref 40–50)
PCO2 BLDV: 37 MM HG (ref 40–50)
PCO2 BLDV: 37 MM HG (ref 40–50)
PCO2 BLDV: 39 MM HG (ref 40–50)
PCO2 BLDV: 39 MM HG (ref 40–50)
PCO2 BLDV: 40 MM HG (ref 40–50)
PCO2 BLDV: 41 MM HG (ref 40–50)
PH BLDV: 7.4 PH (ref 7.32–7.43)
PH BLDV: 7.46 PH (ref 7.32–7.43)
PH BLDV: 7.47 PH (ref 7.32–7.43)
PH BLDV: 7.48 PH (ref 7.32–7.43)
PH BLDV: 7.48 PH (ref 7.32–7.43)
PHOSPHATE SERPL-MCNC: 2.8 MG/DL (ref 2.5–4.5)
PHOSPHATE SERPL-MCNC: 3.6 MG/DL (ref 2.5–4.5)
PLATELET # BLD AUTO: 121 10E9/L (ref 150–450)
PLATELET # BLD AUTO: 130 10E9/L (ref 150–450)
PO2 BLDV: 29 MM HG (ref 25–47)
PO2 BLDV: 29 MM HG (ref 25–47)
PO2 BLDV: 31 MM HG (ref 25–47)
PO2 BLDV: 32 MM HG (ref 25–47)
PO2 BLDV: 33 MM HG (ref 25–47)
PO2 BLDV: 33 MM HG (ref 25–47)
POTASSIUM BLD-SCNC: 3.9 MMOL/L (ref 3.4–5.3)
POTASSIUM SERPL-SCNC: 3.8 MMOL/L (ref 3.4–5.3)
POTASSIUM SERPL-SCNC: 4 MMOL/L (ref 3.4–5.3)
POTASSIUM SERPL-SCNC: 4 MMOL/L (ref 3.4–5.3)
POTASSIUM SERPL-SCNC: 4.5 MMOL/L (ref 3.4–5.3)
PROT SERPL-MCNC: 6.9 G/DL (ref 6.8–8.8)
PROTOCOL CUTOFF: NORMAL
RBC # BLD AUTO: 2.54 10E12/L (ref 4.4–5.9)
RBC # BLD AUTO: 2.75 10E12/L (ref 4.4–5.9)
SA1 CELL: NORMAL
SA1 COMMENTS: NORMAL
SA1 HI RISK ABY: NORMAL
SA1 MOD RISK ABY: NORMAL
SA1 TEST METHOD: NORMAL
SA2 CELL: NORMAL
SA2 COMMENTS: NORMAL
SA2 HI RISK ABY UA: NORMAL
SA2 MOD RISK ABY: NORMAL
SA2 TEST METHOD: NORMAL
SODIUM SERPL-SCNC: 140 MMOL/L (ref 133–144)
SODIUM SERPL-SCNC: 140 MMOL/L (ref 133–144)
SODIUM SERPL-SCNC: 141 MMOL/L (ref 133–144)
UNACCEPTABLE ANTIGEN: NORMAL
UNOS CPRA: 0
WBC # BLD AUTO: 11.2 10E9/L (ref 4–11)
WBC # BLD AUTO: 11.7 10E9/L (ref 4–11)

## 2017-04-13 PROCEDURE — 83605 ASSAY OF LACTIC ACID: CPT | Performed by: INTERNAL MEDICINE

## 2017-04-13 PROCEDURE — 85520 HEPARIN ASSAY: CPT | Performed by: INTERNAL MEDICINE

## 2017-04-13 PROCEDURE — 86923 COMPATIBILITY TEST ELECTRIC: CPT | Performed by: INTERNAL MEDICINE

## 2017-04-13 PROCEDURE — 93452 LEFT HRT CATH W/VENTRCLGRPHY: CPT

## 2017-04-13 PROCEDURE — 25000132 ZZH RX MED GY IP 250 OP 250 PS 637

## 2017-04-13 PROCEDURE — 27210742 ZZH CATH CR1

## 2017-04-13 PROCEDURE — 93453 R&L HRT CATH W/VENTRICLGRPHY: CPT

## 2017-04-13 PROCEDURE — 25000132 ZZH RX MED GY IP 250 OP 250 PS 637: Performed by: STUDENT IN AN ORGANIZED HEALTH CARE EDUCATION/TRAINING PROGRAM

## 2017-04-13 PROCEDURE — C1894 INTRO/SHEATH, NON-LASER: HCPCS

## 2017-04-13 PROCEDURE — 25000132 ZZH RX MED GY IP 250 OP 250 PS 637: Performed by: INTERNAL MEDICINE

## 2017-04-13 PROCEDURE — 25000128 H RX IP 250 OP 636: Performed by: STUDENT IN AN ORGANIZED HEALTH CARE EDUCATION/TRAINING PROGRAM

## 2017-04-13 PROCEDURE — 33967 INSERT I-AORT PERCUT DEVICE: CPT

## 2017-04-13 PROCEDURE — 97535 SELF CARE MNGMENT TRAINING: CPT | Mod: GO

## 2017-04-13 PROCEDURE — 27210437 ZZH NUTRITION PRODUCT SEMIELEM INTERMED LITER

## 2017-04-13 PROCEDURE — 40000133 ZZH STATISTIC OT WARD VISIT

## 2017-04-13 PROCEDURE — 85027 COMPLETE CBC AUTOMATED: CPT | Performed by: INTERNAL MEDICINE

## 2017-04-13 PROCEDURE — 80048 BASIC METABOLIC PNL TOTAL CA: CPT | Performed by: INTERNAL MEDICINE

## 2017-04-13 PROCEDURE — 84132 ASSAY OF SERUM POTASSIUM: CPT | Performed by: INTERNAL MEDICINE

## 2017-04-13 PROCEDURE — 82330 ASSAY OF CALCIUM: CPT | Performed by: INTERNAL MEDICINE

## 2017-04-13 PROCEDURE — 20000004 ZZH R&B ICU UMMC

## 2017-04-13 PROCEDURE — 40000081 ZZH STATISTIC INSERT IABP

## 2017-04-13 PROCEDURE — 25000128 H RX IP 250 OP 636: Performed by: INTERNAL MEDICINE

## 2017-04-13 PROCEDURE — 82805 BLOOD GASES W/O2 SATURATION: CPT

## 2017-04-13 PROCEDURE — 93005 ELECTROCARDIOGRAM TRACING: CPT

## 2017-04-13 PROCEDURE — 85018 HEMOGLOBIN: CPT | Performed by: INTERNAL MEDICINE

## 2017-04-13 PROCEDURE — S0171 BUMETANIDE 0.5 MG: HCPCS

## 2017-04-13 PROCEDURE — 93010 ELECTROCARDIOGRAM REPORT: CPT | Performed by: INTERNAL MEDICINE

## 2017-04-13 PROCEDURE — 25000125 ZZHC RX 250: Performed by: STUDENT IN AN ORGANIZED HEALTH CARE EDUCATION/TRAINING PROGRAM

## 2017-04-13 PROCEDURE — 97110 THERAPEUTIC EXERCISES: CPT | Mod: GO

## 2017-04-13 PROCEDURE — 99152 MOD SED SAME PHYS/QHP 5/>YRS: CPT

## 2017-04-13 PROCEDURE — 4A023N8 MEASUREMENT OF CARDIAC SAMPLING AND PRESSURE, BILATERAL, PERCUTANEOUS APPROACH: ICD-10-PCS | Performed by: INTERNAL MEDICINE

## 2017-04-13 PROCEDURE — 25800025 ZZH RX 258: Performed by: INTERNAL MEDICINE

## 2017-04-13 PROCEDURE — T1013 SIGN LANG/ORAL INTERPRETER: HCPCS | Mod: U3

## 2017-04-13 PROCEDURE — 40000076 ZZH STATISTIC IABP MONITORING

## 2017-04-13 PROCEDURE — 27210982 ZZH KIT RT HC TOTES DISP CR7

## 2017-04-13 PROCEDURE — 86900 BLOOD TYPING SEROLOGIC ABO: CPT | Performed by: INTERNAL MEDICINE

## 2017-04-13 PROCEDURE — 33967 INSERT I-AORT PERCUT DEVICE: CPT | Performed by: INTERNAL MEDICINE

## 2017-04-13 PROCEDURE — 82805 BLOOD GASES W/O2 SATURATION: CPT | Performed by: INTERNAL MEDICINE

## 2017-04-13 PROCEDURE — 80076 HEPATIC FUNCTION PANEL: CPT | Performed by: INTERNAL MEDICINE

## 2017-04-13 PROCEDURE — 25000128 H RX IP 250 OP 636

## 2017-04-13 PROCEDURE — 00000146 ZZHCL STATISTIC GLUCOSE BY METER IP

## 2017-04-13 PROCEDURE — 25000125 ZZHC RX 250: Performed by: INTERNAL MEDICINE

## 2017-04-13 PROCEDURE — 40000048 ZZH STATISTIC DAILY SWAN MONITORING

## 2017-04-13 PROCEDURE — 86850 RBC ANTIBODY SCREEN: CPT | Performed by: INTERNAL MEDICINE

## 2017-04-13 PROCEDURE — 25000125 ZZHC RX 250

## 2017-04-13 PROCEDURE — 71010 XR CHEST PORT 1 VW: CPT

## 2017-04-13 PROCEDURE — 84100 ASSAY OF PHOSPHORUS: CPT | Performed by: INTERNAL MEDICINE

## 2017-04-13 PROCEDURE — 99153 MOD SED SAME PHYS/QHP EA: CPT

## 2017-04-13 PROCEDURE — 93452 LEFT HRT CATH W/VENTRCLGRPHY: CPT | Mod: 26 | Performed by: INTERNAL MEDICINE

## 2017-04-13 PROCEDURE — 83735 ASSAY OF MAGNESIUM: CPT | Performed by: INTERNAL MEDICINE

## 2017-04-13 PROCEDURE — 93463 DRUG ADMIN & HEMODYNMIC MEAS: CPT

## 2017-04-13 PROCEDURE — 86901 BLOOD TYPING SEROLOGIC RH(D): CPT | Performed by: INTERNAL MEDICINE

## 2017-04-13 PROCEDURE — 83605 ASSAY OF LACTIC ACID: CPT

## 2017-04-13 PROCEDURE — 99291 CRITICAL CARE FIRST HOUR: CPT | Mod: 25 | Performed by: INTERNAL MEDICINE

## 2017-04-13 PROCEDURE — 93463 DRUG ADMIN & HEMODYNMIC MEAS: CPT | Performed by: INTERNAL MEDICINE

## 2017-04-13 PROCEDURE — 40000196 ZZH STATISTIC RAPCV CVP MONITORING

## 2017-04-13 RX ORDER — NITROGLYCERIN 20 MG/100ML
.07-2 INJECTION INTRAVENOUS CONTINUOUS PRN
Status: DISCONTINUED | OUTPATIENT
Start: 2017-04-13 | End: 2017-04-13 | Stop reason: HOSPADM

## 2017-04-13 RX ORDER — HEPARIN SODIUM 1000 [USP'U]/ML
1000-10000 INJECTION, SOLUTION INTRAVENOUS; SUBCUTANEOUS EVERY 5 MIN PRN
Status: DISCONTINUED | OUTPATIENT
Start: 2017-04-13 | End: 2017-04-13 | Stop reason: HOSPADM

## 2017-04-13 RX ORDER — LIDOCAINE HYDROCHLORIDE 10 MG/ML
30 INJECTION, SOLUTION EPIDURAL; INFILTRATION; INTRACAUDAL; PERINEURAL
Status: DISCONTINUED | OUTPATIENT
Start: 2017-04-13 | End: 2017-04-13 | Stop reason: HOSPADM

## 2017-04-13 RX ORDER — ONDANSETRON 2 MG/ML
4 INJECTION INTRAMUSCULAR; INTRAVENOUS EVERY 4 HOURS PRN
Status: DISCONTINUED | OUTPATIENT
Start: 2017-04-13 | End: 2017-04-13 | Stop reason: HOSPADM

## 2017-04-13 RX ORDER — NICARDIPINE HYDROCHLORIDE 2.5 MG/ML
100 INJECTION INTRAVENOUS
Status: DISCONTINUED | OUTPATIENT
Start: 2017-04-13 | End: 2017-04-13 | Stop reason: HOSPADM

## 2017-04-13 RX ORDER — NITROGLYCERIN 0.4 MG/1
0.4 TABLET SUBLINGUAL EVERY 5 MIN PRN
Status: DISCONTINUED | OUTPATIENT
Start: 2017-04-13 | End: 2017-04-13 | Stop reason: HOSPADM

## 2017-04-13 RX ORDER — VERAPAMIL HYDROCHLORIDE 2.5 MG/ML
1-5 INJECTION, SOLUTION INTRAVENOUS
Status: DISCONTINUED | OUTPATIENT
Start: 2017-04-13 | End: 2017-04-13 | Stop reason: HOSPADM

## 2017-04-13 RX ORDER — DEXTROSE MONOHYDRATE 25 G/50ML
12.5-5 INJECTION, SOLUTION INTRAVENOUS EVERY 30 MIN PRN
Status: DISCONTINUED | OUTPATIENT
Start: 2017-04-13 | End: 2017-04-13 | Stop reason: HOSPADM

## 2017-04-13 RX ORDER — DOBUTAMINE HYDROCHLORIDE 200 MG/100ML
2-20 INJECTION INTRAVENOUS CONTINUOUS PRN
Status: DISCONTINUED | OUTPATIENT
Start: 2017-04-13 | End: 2017-04-13 | Stop reason: HOSPADM

## 2017-04-13 RX ORDER — FENTANYL CITRATE 50 UG/ML
INJECTION, SOLUTION INTRAMUSCULAR; INTRAVENOUS
Status: COMPLETED
Start: 2017-04-13 | End: 2017-04-13

## 2017-04-13 RX ORDER — METHYLPREDNISOLONE SODIUM SUCCINATE 125 MG/2ML
125 INJECTION, POWDER, LYOPHILIZED, FOR SOLUTION INTRAMUSCULAR; INTRAVENOUS
Status: DISCONTINUED | OUTPATIENT
Start: 2017-04-13 | End: 2017-04-13 | Stop reason: HOSPADM

## 2017-04-13 RX ORDER — NITROGLYCERIN 5 MG/ML
100-500 VIAL (ML) INTRAVENOUS
Status: DISCONTINUED | OUTPATIENT
Start: 2017-04-13 | End: 2017-04-13 | Stop reason: HOSPADM

## 2017-04-13 RX ORDER — SODIUM NITROPRUSSIDE 25 MG/ML
100-200 INJECTION INTRAVENOUS
Status: DISCONTINUED | OUTPATIENT
Start: 2017-04-13 | End: 2017-04-13 | Stop reason: HOSPADM

## 2017-04-13 RX ORDER — PROMETHAZINE HYDROCHLORIDE 25 MG/ML
6.25-25 INJECTION, SOLUTION INTRAMUSCULAR; INTRAVENOUS EVERY 4 HOURS PRN
Status: DISCONTINUED | OUTPATIENT
Start: 2017-04-13 | End: 2017-04-13 | Stop reason: HOSPADM

## 2017-04-13 RX ORDER — PROTAMINE SULFATE 10 MG/ML
1-5 INJECTION, SOLUTION INTRAVENOUS
Status: DISCONTINUED | OUTPATIENT
Start: 2017-04-13 | End: 2017-04-13 | Stop reason: HOSPADM

## 2017-04-13 RX ORDER — FENTANYL CITRATE 50 UG/ML
25-50 INJECTION, SOLUTION INTRAMUSCULAR; INTRAVENOUS
Status: DISCONTINUED | OUTPATIENT
Start: 2017-04-13 | End: 2017-04-13 | Stop reason: HOSPADM

## 2017-04-13 RX ORDER — DOPAMINE HYDROCHLORIDE 160 MG/100ML
2-20 INJECTION, SOLUTION INTRAVENOUS CONTINUOUS PRN
Status: DISCONTINUED | OUTPATIENT
Start: 2017-04-13 | End: 2017-04-13 | Stop reason: HOSPADM

## 2017-04-13 RX ORDER — POTASSIUM CHLORIDE 7.45 MG/ML
10 INJECTION INTRAVENOUS
Status: DISCONTINUED | OUTPATIENT
Start: 2017-04-13 | End: 2017-04-13 | Stop reason: HOSPADM

## 2017-04-13 RX ORDER — ATROPINE SULFATE 0.1 MG/ML
.5-1 INJECTION INTRAVENOUS
Status: DISCONTINUED | OUTPATIENT
Start: 2017-04-13 | End: 2017-04-13 | Stop reason: HOSPADM

## 2017-04-13 RX ORDER — AMOXICILLIN 250 MG
1-2 CAPSULE ORAL 2 TIMES DAILY PRN
Status: DISCONTINUED | OUTPATIENT
Start: 2017-04-13 | End: 2017-05-12 | Stop reason: HOSPADM

## 2017-04-13 RX ORDER — EPTIFIBATIDE 2 MG/ML
2 INJECTION, SOLUTION INTRAVENOUS CONTINUOUS PRN
Status: DISCONTINUED | OUTPATIENT
Start: 2017-04-13 | End: 2017-04-13 | Stop reason: HOSPADM

## 2017-04-13 RX ORDER — ARGATROBAN 1 MG/ML
150 INJECTION, SOLUTION INTRAVENOUS
Status: DISCONTINUED | OUTPATIENT
Start: 2017-04-13 | End: 2017-04-13 | Stop reason: HOSPADM

## 2017-04-13 RX ORDER — POLYETHYLENE GLYCOL 3350 17 G/17G
17 POWDER, FOR SOLUTION ORAL DAILY PRN
Status: DISCONTINUED | OUTPATIENT
Start: 2017-04-13 | End: 2017-05-12 | Stop reason: HOSPADM

## 2017-04-13 RX ORDER — NIFEDIPINE 10 MG/1
10 CAPSULE ORAL
Status: DISCONTINUED | OUTPATIENT
Start: 2017-04-13 | End: 2017-04-13 | Stop reason: HOSPADM

## 2017-04-13 RX ORDER — EPTIFIBATIDE 2 MG/ML
180 INJECTION, SOLUTION INTRAVENOUS EVERY 10 MIN PRN
Status: DISCONTINUED | OUTPATIENT
Start: 2017-04-13 | End: 2017-04-13 | Stop reason: HOSPADM

## 2017-04-13 RX ORDER — BUMETANIDE 0.25 MG/ML
2 INJECTION INTRAMUSCULAR; INTRAVENOUS ONCE
Status: COMPLETED | OUTPATIENT
Start: 2017-04-13 | End: 2017-04-13

## 2017-04-13 RX ORDER — DIPHENHYDRAMINE HYDROCHLORIDE 50 MG/ML
25-50 INJECTION INTRAMUSCULAR; INTRAVENOUS
Status: DISCONTINUED | OUTPATIENT
Start: 2017-04-13 | End: 2017-04-13 | Stop reason: HOSPADM

## 2017-04-13 RX ORDER — PRASUGREL 10 MG/1
10-60 TABLET, FILM COATED ORAL
Status: DISCONTINUED | OUTPATIENT
Start: 2017-04-13 | End: 2017-04-13 | Stop reason: HOSPADM

## 2017-04-13 RX ORDER — FUROSEMIDE 10 MG/ML
20-100 INJECTION INTRAMUSCULAR; INTRAVENOUS
Status: DISCONTINUED | OUTPATIENT
Start: 2017-04-13 | End: 2017-04-13 | Stop reason: HOSPADM

## 2017-04-13 RX ORDER — METOPROLOL TARTRATE 1 MG/ML
5 INJECTION, SOLUTION INTRAVENOUS EVERY 5 MIN PRN
Status: DISCONTINUED | OUTPATIENT
Start: 2017-04-13 | End: 2017-04-13 | Stop reason: HOSPADM

## 2017-04-13 RX ORDER — NALOXONE HYDROCHLORIDE 0.4 MG/ML
0.4 INJECTION, SOLUTION INTRAMUSCULAR; INTRAVENOUS; SUBCUTANEOUS EVERY 5 MIN PRN
Status: DISCONTINUED | OUTPATIENT
Start: 2017-04-13 | End: 2017-04-13 | Stop reason: HOSPADM

## 2017-04-13 RX ORDER — ARGATROBAN 1 MG/ML
350 INJECTION, SOLUTION INTRAVENOUS
Status: DISCONTINUED | OUTPATIENT
Start: 2017-04-13 | End: 2017-04-13 | Stop reason: HOSPADM

## 2017-04-13 RX ORDER — HYDRALAZINE HYDROCHLORIDE 20 MG/ML
10-20 INJECTION INTRAMUSCULAR; INTRAVENOUS
Status: DISCONTINUED | OUTPATIENT
Start: 2017-04-13 | End: 2017-04-13 | Stop reason: HOSPADM

## 2017-04-13 RX ORDER — POTASSIUM CHLORIDE 29.8 MG/ML
20 INJECTION INTRAVENOUS
Status: DISCONTINUED | OUTPATIENT
Start: 2017-04-13 | End: 2017-04-13 | Stop reason: HOSPADM

## 2017-04-13 RX ORDER — PHENYLEPHRINE HCL IN 0.9% NACL 1 MG/10 ML
20-100 SYRINGE (ML) INTRAVENOUS
Status: DISCONTINUED | OUTPATIENT
Start: 2017-04-13 | End: 2017-04-13 | Stop reason: HOSPADM

## 2017-04-13 RX ORDER — NITROGLYCERIN 5 MG/ML
100-200 VIAL (ML) INTRAVENOUS
Status: DISCONTINUED | OUTPATIENT
Start: 2017-04-13 | End: 2017-04-13 | Stop reason: HOSPADM

## 2017-04-13 RX ORDER — PROTAMINE SULFATE 10 MG/ML
25-100 INJECTION, SOLUTION INTRAVENOUS EVERY 5 MIN PRN
Status: DISCONTINUED | OUTPATIENT
Start: 2017-04-13 | End: 2017-04-13 | Stop reason: HOSPADM

## 2017-04-13 RX ORDER — FENTANYL CITRATE 50 UG/ML
25 INJECTION, SOLUTION INTRAMUSCULAR; INTRAVENOUS ONCE
Status: COMPLETED | OUTPATIENT
Start: 2017-04-13 | End: 2017-04-13

## 2017-04-13 RX ORDER — FLUMAZENIL 0.1 MG/ML
0.2 INJECTION, SOLUTION INTRAVENOUS
Status: DISCONTINUED | OUTPATIENT
Start: 2017-04-13 | End: 2017-04-13 | Stop reason: HOSPADM

## 2017-04-13 RX ORDER — LORAZEPAM 2 MG/ML
.5-2 INJECTION INTRAMUSCULAR EVERY 4 HOURS PRN
Status: DISCONTINUED | OUTPATIENT
Start: 2017-04-13 | End: 2017-04-13 | Stop reason: HOSPADM

## 2017-04-13 RX ADMIN — POTASSIUM CHLORIDE 20 MEQ: 29.8 INJECTION, SOLUTION INTRAVENOUS at 10:53

## 2017-04-13 RX ADMIN — PANTOPRAZOLE SODIUM 40 MG: 40 INJECTION, POWDER, FOR SOLUTION INTRAVENOUS at 19:34

## 2017-04-13 RX ADMIN — HYDRALAZINE HYDROCHLORIDE 75 MG: 50 TABLET ORAL at 08:21

## 2017-04-13 RX ADMIN — HYDRALAZINE HYDROCHLORIDE 75 MG: 50 TABLET ORAL at 02:06

## 2017-04-13 RX ADMIN — ACETAMINOPHEN 325 MG: 325 TABLET, FILM COATED ORAL at 06:00

## 2017-04-13 RX ADMIN — PANTOPRAZOLE SODIUM 40 MG: 40 INJECTION, POWDER, FOR SOLUTION INTRAVENOUS at 08:28

## 2017-04-13 RX ADMIN — DEXTROSE MONOHYDRATE: 100 INJECTION, SOLUTION INTRAVENOUS at 00:04

## 2017-04-13 RX ADMIN — FENTANYL CITRATE 25 MCG: 50 INJECTION INTRAMUSCULAR; INTRAVENOUS at 14:51

## 2017-04-13 RX ADMIN — HYDRALAZINE HYDROCHLORIDE 75 MG: 50 TABLET ORAL at 15:55

## 2017-04-13 RX ADMIN — POTASSIUM CHLORIDE 20 MEQ: 1.5 POWDER, FOR SOLUTION ORAL at 17:19

## 2017-04-13 RX ADMIN — ACETAMINOPHEN 325 MG: 325 TABLET, FILM COATED ORAL at 22:20

## 2017-04-13 RX ADMIN — SODIUM NITROPRUSSIDE 0.5 MCG/KG/MIN: 25 INJECTION, SOLUTION, CONCENTRATE INTRAVENOUS at 20:48

## 2017-04-13 RX ADMIN — POTASSIUM CHLORIDE 20 MEQ: 1.5 POWDER, FOR SOLUTION ORAL at 02:06

## 2017-04-13 RX ADMIN — SODIUM NITROPRUSSIDE 1 MCG/KG/MIN: 25 INJECTION, SOLUTION, CONCENTRATE INTRAVENOUS at 20:52

## 2017-04-13 RX ADMIN — FENTANYL CITRATE 25 MCG: 50 INJECTION, SOLUTION INTRAMUSCULAR; INTRAVENOUS at 14:51

## 2017-04-13 RX ADMIN — MULTIVIT AND MINERALS-FERROUS GLUCONATE 9 MG IRON/15 ML ORAL LIQUID 15 ML: at 08:20

## 2017-04-13 RX ADMIN — FENTANYL CITRATE 25 MCG: 50 INJECTION, SOLUTION INTRAMUSCULAR; INTRAVENOUS at 21:21

## 2017-04-13 RX ADMIN — HUMAN INSULIN 1 UNITS/HR: 100 INJECTION, SOLUTION SUBCUTANEOUS at 13:44

## 2017-04-13 RX ADMIN — CLOPIDOGREL 75 MG: 75 TABLET, FILM COATED ORAL at 08:20

## 2017-04-13 RX ADMIN — QUETIAPINE FUMARATE 25 MG: 25 TABLET, FILM COATED ORAL at 22:20

## 2017-04-13 RX ADMIN — FENTANYL CITRATE 50 MCG: 50 INJECTION, SOLUTION INTRAMUSCULAR; INTRAVENOUS at 20:29

## 2017-04-13 RX ADMIN — BUMETANIDE 2 MG: 0.25 INJECTION INTRAMUSCULAR; INTRAVENOUS at 08:20

## 2017-04-13 RX ADMIN — HYDROMORPHONE HYDROCHLORIDE 0.5 MG: 1 INJECTION, SOLUTION INTRAMUSCULAR; INTRAVENOUS; SUBCUTANEOUS at 03:17

## 2017-04-13 RX ADMIN — POTASSIUM CHLORIDE 20 MEQ: 1.5 POWDER, FOR SOLUTION ORAL at 06:00

## 2017-04-13 RX ADMIN — OXYCODONE HYDROCHLORIDE 5 MG: 5 TABLET ORAL at 22:21

## 2017-04-13 ASSESSMENT — PAIN DESCRIPTION - DESCRIPTORS
DESCRIPTORS: ACHING
DESCRIPTORS: ACHING

## 2017-04-13 NOTE — PROGRESS NOTES
GASTROENTEROLOGY LUMINAL PROGRESS NOTE  4/13/2017       ASSESSMENT:  51 year old male with DMII, newly diagnosed CAD s/p STEMI on 3/26 and subsequent PCI, balloon pump use complicated by distributive and cardiogenic shock who we are asked to see for maroon stools.      # Gastrointestinal bleeding. Based minimal change in vital signs and hemoglobin, most likely this is a lower source such as mild ischemic colitis or rectal ulcer 2/2 rectal tube placement previously. CT A/P showed small/stable RP hemorrhage, no bowel thickening. There are notes of possible hematemesis on initial admission but he should be more unstable to have maroon stool due to an upper source. No upper symptoms. No abdominal pain. Had been on heparin gtt, ASA, clopidogrel. Heparin stopped 4/11, with initial cessation of maroon stools, however have reoccurred overnight 4/12. Hgb has remained stable.        Recommendations  -- advance diet today and restart TF  -- continue excellent supportive cares   -- Ideal to hold heparin for 3 days (throught 4/14) to allow for mucosal healing and then restart to wean off balloon pump  (would recommend not bolusing if possible)   -- follow hemoglobin, would not check more than every 8 hours unless there is significant hemodynamic change  -- empiric PPI twice daily, transition to oral once able to tolerate PO   -- will follow clinically, hold off on endoscopy for now unless persistent hemodynamically significant bleeding, if gi bleeding continues would likely plan a flex sig for 4/15      Gastroenterology follow up recommendations: TBD     Thank you for involving us in this patient's care. Please do not hesitate to contact the GI service with any questions or concerns.      Pt care plan discussed with Dr. Benitez, GI staff physician.     Juan Thomas MD  PGY-1  [phone]  _______________________________________________________________  S: Had 4 maroon stools last night, described in chart as watery. No further BM  this AM. Remains hungry and would like diet advanced. Denies any f/v, n/v or abdominal pain.    O:  Temp:  [98.6  F (37  C)-100  F (37.8  C)] 98.6  F (37  C)  Heart Rate:  [105-120] 110  Resp:  [14-16] 16  BP: ()/(50-85) 108/75  SpO2:  [94 %-100 %] 100 %    Physical Exam  Gen: NAD  CV: RRR, holosystolic murmer at apex.   Resp: CTAB, no crackles or wheezes.   Abdominal: Soft, non-distended, no TTP, no HSM, +BS.   Extremities: Right groin catheter in place with no bleeding.   Neuro: A&Ox3, moves all four extremities.    Rectal: Small bright red blood on glove, no melena.     LABS:  BMP  Recent Labs  Lab 04/13/17  0306 04/12/17  2350 04/12/17  2028 04/12/17  1601 04/12/17  1135  04/12/17  0352     --  138  --  141  --  141   POTASSIUM 4.0 3.9 3.8 4.1 4.0  < > 3.7   CHLORIDE 106  --  103  --  103  --  102   ROB 7.5*  --  7.8*  --  7.6*  --  7.8*   CO2 26  --  30  --  30  --  31   BUN 35*  --  37*  --  42*  --  46*   CR 1.42*  --  1.48*  --  1.48*  --  1.50*   *  --  179*  --  99  --  110*   < > = values in this interval not displayed.  CBC  Recent Labs  Lab 04/13/17  0306  04/12/17  0352  04/11/17  0254 04/10/17  0301   WBC 11.7*  --  11.6*  --  12.8* 13.3*   RBC 2.75*  --  2.71*  --  3.13* 2.91*   HGB 8.4*  < > 8.1*  < > 9.5* 8.7*   HCT 27.2*  --  26.8*  --  31.3* 29.5*   MCV 99  --  99  --  100 101*   MCH 30.5  --  29.9  --  30.4 29.9   MCHC 30.9*  --  30.2*  --  30.4* 29.5*   RDW 18.2*  --  18.2*  --  18.2* 18.0*   *  --  132*  --  174 175   < > = values in this interval not displayed.  INR    Recent Labs  Lab 04/12/17  0352 04/11/17  0254 04/10/17  0301 04/09/17  0317   INR 1.12 1.07 1.12 1.19*     LFTs    Recent Labs  Lab 04/13/17  0306 04/12/17  0352 04/11/17  0254 04/10/17  0301   ALKPHOS 64 69 88 88   AST 30 30 47* 44   ALT 72* 83* 116* 129*   BILITOTAL 1.1 1.0 0.7 0.9   PROTTOTAL 6.9 6.4* 6.9 6.5*   ALBUMIN 2.5* 2.4* 2.6* 2.5*      PANCNo lab results found in last 7 days.

## 2017-04-13 NOTE — PLAN OF CARE
Problem: Goal Outcome Summary  Goal: Goal Outcome Summary  Outcome: No Change  Pt A&Ox4 with intermittent confusion periods when awoken from sleep, when awake, appropriately follows commands and answers questions. Tmax 99, pt was pan cultured during the day today. Continues on RA sats %, LS clear diminished bases.  ST -110s with no ectopy, MAP 60-80s, pressures get soft at night with MAP in 50s with pt asleep, MD notified, no orders at this time. CVP 12/10/11, PA (34-38)/(24-26), SVO2 46/57/54, CO 3.5-4.7, CI 2.2-2.9, and SVR 0745-4648.  IABP to R groin continues 1:1 100% augmentation with correlating MAPs. 2mg Bumex given at start of shift, uop 70-310ml/hr.  Maroon BMs restarted this evening, pt had 4 loose watery stools, and increasing abdominal pain, MD notified, no orders at this time.  Pt was given 325mg Tylenol x2 and 0.5mg Dilaudid IV for pain.  Slept well with seroquel aside from restlessness from multiple loose stools.  Electrolytes replaced, will continue to update MD with any changes in condition per protocol.

## 2017-04-13 NOTE — PROGRESS NOTES
"[facility]  Cardiology II Service / Advanced Heart Failure  Daily Note        Interval History:   - Has had 4 small - medium sized maroon BMs over the last 24 hours, Hb stable, off heparin inf  - Tolerated Hydral 75 q6  - Mentation looks more clear this AM.  - More restful last night, after seroquel  - Net negative 1L overnight with Bumex 2 mg x 2      Pertinent Medications:  I have reviewed this patient's current medications      QUEtiapine  25 mg Oral At Bedtime     pantoprazole  40 mg Intravenous BID     hydrALAZINE  75 mg Oral Q6H     pneumococcal vaccine  0.5 mL Intramuscular Once     sodium chloride (PF)  3 mL Intracatheter Q8H     multivitamins with minerals  15 mL Per Feeding Tube Daily     clopidogrel  75 mg Oral or NG Tube Daily     aspirin  81 mg Oral or Feeding Tube Daily         Examination:  /61 (BP Location: Left arm)  Temp 98.5  F (36.9  C) (Oral)  Resp 20  Ht 1.575 m (5' 2.01\")  Wt 58.6 kg (129 lb 3 oz)  SpO2 100%  BMI 23.62 kg/m2  GEN: A, cooperative and conversational   NECK: can not assess JVP   RESP: crackles   CV: S1S2S3, holossystolic murmur at the apex  ABD: Soft, nontender to palpation, no HSM, +BS, no guarding  EXT: No peripheral edema, warm/well perfused   NEURO: CN II-XII intact, normal 5/5 strength in all extremitites  SKIN: Normal skin turgor, no rash on limited exam    Data:  CMP  Recent Labs  Lab 04/13/17  0958 04/13/17  0306 04/12/17  2350 04/12/17  2028 04/12/17  1601 04/12/17  1135  04/12/17  0352  04/11/17  0254  04/10/17  0301  04/08/17  0425   NA  --  141  --  138  --  141  --  141  < > 146*  < > 144  < > 149*   POTASSIUM 3.8 4.0 3.9 3.8 4.1 4.0  < > 3.7  < > 3.2*  < > 4.1  < > 4.0   CHLORIDE  --  106  --  103  --  103  --  102  < > 101  < > 100  < > 106   CO2  --  26  --  30  --  30  --  31  < > 37*  < > 37*  < > 37*   ANIONGAP  --  9  --  6  --  8  --  7  < > 8  < > 7  < > 7   GLC  --  118*  --  179*  --  99  --  110*  < > " 100*  < > 157*  < > 155*   BUN  --  35*  --  37*  --  42*  --  46*  < > 62*  < > 70*  < > 92*   CR  --  1.42*  --  1.48*  --  1.48*  --  1.50*  < > 1.71*  < > 1.81*  < > 2.84*   GFRESTIMATED  --  53*  --  50*  --  50*  --  50*  < > 43*  < > 40*  < > 24*   GFRESTBLACK  --  64  --  61  --  61  --  60*  < > 52*  < > 48*  < > 29*   ROB  --  7.5*  --  7.8*  --  7.6*  --  7.8*  < > 8.4*  < > 7.8*  < > 7.3*   MAG 2.3 2.2  --   --  2.3  --   --  2.6*  < > 2.6*  < > 2.6*  < > 2.5*   PHOS 3.6  --   --   --   --   --   --  3.8  --  3.9  --   --   --  3.5   PROTTOTAL  --  6.9  --   --   --   --   --  6.4*  --  6.9  --  6.5*  < > 6.3*   ALBUMIN  --  2.5*  --   --   --   --   --  2.4*  --  2.6*  --  2.5*  < > 2.4*   BILITOTAL  --  1.1  --   --   --   --   --  1.0  --  0.7  --  0.9  < > 0.8   ALKPHOS  --  64  --   --   --   --   --  69  --  88  --  88  < > 96   AST  --  30  --   --   --   --   --  30  --  47*  --  44  < > 38   ALT  --  72*  --   --   --   --   --  83*  --  116*  --  129*  < > 215*   < > = values in this interval not displayed.  CBC    Recent Labs  Lab 04/13/17  0306 04/12/17 2028 04/12/17  1601 04/12/17  1028 04/12/17  0352  04/11/17  0254 04/10/17  0301   WBC 11.7*  --   --   --  11.6*  --  12.8* 13.3*   RBC 2.75*  --   --   --  2.71*  --  3.13* 2.91*   HGB 8.4* 8.0* 8.1* 8.0* 8.1*  < > 9.5* 8.7*   HCT 27.2*  --   --   --  26.8*  --  31.3* 29.5*   MCV 99  --   --   --  99  --  100 101*   MCH 30.5  --   --   --  29.9  --  30.4 29.9   MCHC 30.9*  --   --   --  30.2*  --  30.4* 29.5*   RDW 18.2*  --   --   --  18.2*  --  18.2* 18.0*   *  --   --   --  132*  --  174 175   < > = values in this interval not displayed.  INR    Recent Labs  Lab 04/12/17  0352 04/11/17  0254 04/10/17  0301 04/09/17  0317   INR 1.12 1.07 1.12 1.19*     Arterial Blood Gas    Recent Labs  Lab 04/13/17  1152 04/13/17  0808 04/13/17  0306 04/12/17  2350   O2PER 2L 2L 21 21       Imaging Studies:  Echo 3/27  The visual ejection fraction  is estimated at 30-35%.  There is extensive inferior, inferoseptal, and inferolateral wall akinesis.  Basal/mid lateral wall hypokinesis  There is moderate to severe septal hypokinesis.  Septal wall motion abnormality may reflect pacemaker activation.  There is a catheter/pacemaker lead seen in the right ventricle.  The right ventricle is mildly dilated.  Severely decreased right ventricular systolic function  There is no pericardial effusion.  The rhythm was paced.  Compared to the prior study, the LVEF appears somewhat decreased. Septal wall  motion abnormality larger- may reflect, in part, that patient in sinus tach on  prior study, and ventricular paced now. No hemodynamically significant  valvular abnormalities on 2D or color flow imaging     CT chest/abdomen 3/27   IMPRESSION:   1. Small mediastinal hemorrhage, small bilateral pleural effusions  which may be hemorrhagic. Small intraperitoneal hemorrhage. Minimal  pericardial hemorrhage.  2. Minimal pneumoperitoneum in the left paracolic gutter, of unknown  significance. No pneumatosis as clinically questioned.  3. IABP slightly low in position.  4. Bilateral pulmonary dependent consolidations represent compression  atelectasis, however differentials include aspiration.     Cath 3/26 [facility]  SUMMARY:   --Inferior STEMI w/ late presentation. Culprit dictated by complete  heart block, and cardiogenic shock. Emergent echo in the Cath Lab also  shows significant RV dysfunction.  --Three vessel coronary artery disease with diffuse coronary disease  out to the distal vessels  --Successful POBA of the prox and mid-RCA. Stent was not placed, in  anticipation he may need to be considered for bypass surgery in the  near future  --Insertion of a temporary pacemaker for bradycardia (AVB) and  hypotension  --Insertion of a IABP  --Upper GI bleed consistent with Kaylin-[place]     [facility] 3/27     CT head 3/28: normal      Echo 3/29  Interpretation  Summary  Limited study. Ischemic CM. Moderately (EF 30-35%) reduced left ventricular  function is present. Traced at 32%.  Posterior wall akinesis is present. Lateral wall akinesis is present. Inferior  wall akinesis is present.  Right ventricular function, chamber size, wall motion, and thickness are  normal.  Dilation of the inferior vena cava is present with abnormal respiratory  variation in diameter.     Compared to prior study, RV fxn is improved.     Echo 3/31  Left Ventricle  The Ejection Fraction is estimated at 50-55%. Inferior,posterior,lateral,basal  septal wall akinesis as reported before. No change.     CORONARY ANGIOGRAM 4/1:   1. Both coronary arteries arise from their respective cusps.  2. Right dominant.  3. LM has mild luminal irregularities.   4. LAD supplies the apex along with the RCA (type 2 LAD) and gives rise to septal perforators and a large and branching D1. There are widely patent stents extending from the proximal to mid LAD. The dLAD has mild to moderate disease to 30% stenosis. The D1 is jailed by the LAD stents, but has EBER 3 flow with disease to 30% stenosis.   5. The small ramus is no longer present.  6. LCX is occluded at the ostium.   7. RCA gives rise to PL branches and supplies PDA. There are widely patent stents extending from the proximal to mid RCA. The remainder of the mRCA has disease to 50% stenosis and the dRCA has disease to 10% stenosis. The RPDA has a widely patent stent and the remainder of the artery has mild disease to 10% stenosis. The RPLA has small vessels with diffuse disease including a distal branch occlusion. The RPLA supplies some small collateral branches to the dLCx.      COMPLICATIONS:  1. None      SUMMARY:   1. Cardiogenic shock following an inferior STEMI.  2. The LCx re-occlusion is not surprising as the recently revascularized LCx  had poor outflow and the revascularized portion did not supply a large territory (limited to no flow was present  distal to the stented segment immediately following stenting). Repeat revascularization of the LCx would result in the same outcome of repeat closure and also risk embolizing thrombus from the LCx into the LAD so no treatment of the occluded LCx was performed.   3. Three vessel coronary artery disease with patent LAD and RCA stents and an occluded LCx.     Echo 4/3  Left ventricular size is normal.  The Ejection Fraction is estimated at 35-40%.  Inferior wall akinesis is present.  Lateral wall akinesis is present.  Posterior wall akinesis is present.  Right ventricular function, chamber size, wall motion, and thickness are  normal.  Moderate mitral insufficiency is present.  Moderate tricuspid insufficiency is present.  Right ventricular systolic pressure is 47mmHg above the right atrial pressure.  The inferior vena cava is normal.     Echo 4/5  Moderately (EF 35-40%) reduced left ventricular function is present. Traced at  40%.  Moderate mitral insufficiency is present.  The cause of the mitral insufficiency appears to be restricted posterior  leaflet from posterior MI. No mitral leaflet pathology seen.  Dilation of the inferior vena cava is present with abnormal respiratory  variation in diameter.     CT abd without contrast 4/4  IMPRESSION:   1. No evidence of ischemic bowel, obstruction, or intra-abdominal  infection.  2. Bibasilar patchy pulmonary opacities likely represent combination  of aspiration and atelectasis.  3. Small amount of simple free fluid within the lower abdomen.  4. Small left retroperitoneal hematoma along the iliac vasculature  likely postprocedural.   5. Unchanged size of bilateral pleural effusions, which now consist of  simple fluid.    Assessment and Plan  51 year old man presented with cardiogenic shock from inferior STEMI s/p RCA aspiration thrombectomy and POBA on 3/26 at [facility] s/p IABP and initiation of Dopamine, also during procedure, CHB s/p temp pacer, emesis/hematmesis and  altered mental status s/p intubation transferred here for consideration of mechanical support on 3/27. Had PCI to RCA, LAD and LCx (on ASA and plavix) started on epinephrine in addition to dopamine, post procedure developed distributive shock picture from infection (aspiration pneumonia? Sputum cx growing staph aureus, on vanco and zosyn) vs post-MI SIRS response. Currently in cardiogenic shock with IABP in situ    Card  # Cardiogenic shock 4/3- from underlying 3v CAD-has had high SVR and low CI, complicated by ischemic MR  # Due to inferior wall STEMI. S/P 7 stents to LAD, circ, RCA (angiogram report pending). Now with IABP, temporary pacemaker after 3rd deg heart block in cath lab at [facility] on 3/26   # Small pericardial hemorrhage   - IABP in place  - Hydral 75 mg q6H, once his SCr improves will consider starting Nipride for a more stable afterload reduction regimen, and give him an IABP wean trial  - Hold off on isordil for now  - LVAD work up given SCr improving  - Plan to keep CVP 9-12 today  - Continue LVAD work up  - Discuss the possibility of removing IABP with CVTS      # Neuro  - alert, follows commands  - Intermittently confused, no focal deficits, WBC down trending  - Seroquel 25 mg         Respiratory  #Intubated for airway protection due to mental status and emesis on 3/26, extubated 4/3  #Probable Aspiration PNA/pneumonitis vs MSSA pneumonia  #Small mediastinal hemorrhage         # ID  - fever on 4/3-type 2 MI vs infection (unclear source)  - Sepsis from aspiration pneumonia/pneumonitis vs MSSA pneumonia vs post-MI SIRS response initially on 3/28  - repeat cultures if febrile  - Currently off antibiotics. He was on Unasyn. Switched to vanco and zosyn (4/3 - 4/7) for fever and elevated lactate concern for sepsis         # Endocrine  T2DM: HA1C 7.5 on admission. BG climibing to 170s since admission. Will likely continue to rise with steroids.   - Insulin gtt per nurising protocol with hypoglycemia  precautions.       # GI  - Had several maroon BMs on 4/11 and 4/12, Hb overall dropped by a gram or less, heparin inf held and GI consulted  - Will monitor Hb  - Consider scope if hemodynamically unstable, or if his Hb continues to drop  - Protonix 40 IV BID     Minimal pneumoperitoneum, unclear etiology: Seen by General surgery.  - had bloody NG output earlier in hospitalization.  - Conservative Mx for now        # Renal/  Renal function slightly improved- from hypoperfusion from cardiogenic shock 4/5 plus central venous congestion plus contrast nephropathy  -Diuresis  - Daily Cr  - Avoid nephrotoxins as able  - Plan to make 1L net negative today     -Hypernatremia  - Resolved. Stopped his D5W, only getting FWF's.               FEN: feeding tube  Code: FULL  PPx: PPI 40mg daily, senna PRN  Dispo: pending stability        Plan of care discussed with Dr. Estiven Loving MD  Cardiovascular Disease Fellow  Pager: [phone]    I have reviewed today's vital signs, notes, medications, labs and imaging.  I have also seen and examined the patient and agree with the findings and plan as outlined above.  Pt with no compliants.  VSS with /58, CVP 11, CO/CI 4.2/2.6 with DBX 5 mcg/kg/min and IABP 1:1.  Lungs clear and S1 and S2 tachy cardia with loud IV/VI HSM and benign abd.  Labs with Cr 1.4 and WBC 11.7.  Assessment: Pt with IWMI with mod-severe MR and LV dysfn stable on IABP and Dbx.  Will give trial off IABP and follow for GI bleed.  Total critical care time 40 min.   Vikram Jean-Baptiste MD, PhD  Professor, Heart Failure and Cardiac Transplantation  [facility]

## 2017-04-13 NOTE — PLAN OF CARE
Problem: Goal Outcome Summary  Goal: Goal Outcome Summary  OT 4E: Pt more fatigued during today's session and limited participation in therapeutic exercise. Therapist administered MoCA 7.1 English version to assess cognitive function. Pt scored 14/30 indicating severe neurocognitive deficits (score of 26 or above is considered normal). Pt with deficits in orientation, executive function, memory, attention, and abstraction. Pt likely to have safety concerns with ADLs, financial management, medication management, cooking, etc.      Rec TCU when medically stable to progress to mod I.

## 2017-04-13 NOTE — PROGRESS NOTES
IABP catheter removed at 1435 with no complications. IABP catheter not saved. Please see flowsheets for further information.

## 2017-04-13 NOTE — PROGRESS NOTES
Patient's vitals throughout shift: Sinus tachycardia, MAPs upper 50s-70s, -130, CVP 6-13, PA 30-40s/20s-30s, CI 2.4-2.6, CO 4.1-4.2, SvO2 mid 50s. A few hours after IABP was d/c'd at 1435, patient's MAPs decreased to 57-60, SvO2 decreased to 46. Lactic acid increased to 2.2. HR increased from ~110 to 125/130. Patient's is afebrile, Tmax 99. Patient did not have a BM during shift but is passing flatus. Tube feedings were restarted at ~1500 (45 cc/hr, 25 cc/q 2 hr free water flush). D10 was d/c'd after TF restarted. Currently on room air with SpO2 >97%.     Patient received 2 mg Bumex in am. Urine output is approximately 100-250 cc/hr.     Currently on 7 units/hr Insulin, 7.5 mcg/kg/min Dobutamine (increased after IABP d/c'd and MAPs decreased). Potassium was supplemented.    Patient received 25 mcg Fentanyl for groin pain after IABP was d/c'd. Patient is oriented and alert, although lethargic.   At 1845, physicians decided to reinsert IABP. Consent is currently being signed.

## 2017-04-13 NOTE — PROGRESS NOTES
"   04/13/17 1400   Visit Information   Visit Made By Staff    Type of Visit Follow-up;Staff consultation/triage;Palliative Consult Service;Distress   Distress Emotional;Confucianism/Spiritual   Visited Patient;Family   Interventions   Plan of Care Review   With patient/family/proxy;With interdisciplinary team   Basic Spiritual Interventions    introduction/orientation to Spiritual Health Services;Assessment of spiritual needs/resources;Prayer   Provision of Spiritual Resources  Sacred text  (Chanted sacred text)   Advanced Assessments/Interventions   Presenting Concerns/Issues Spiritual/Oriental orthodox/emotional support;Challenged coping;Stress/self-care;Grief and adjustment issues   Healing Modalities Provided Other  (Chanting)   SPIRITUAL HEALTH SERVICES   Spiritual Assessment Progress Note   [facility] 4E   REFERRAL SOURCE: Palliative team asked that I see pt because I am a Church  (and staff ).  Pt requested this.   On this visit, introduced myself to Luke and his daughter (from [facility]   EXPERIENCE OF ILLNESS/HOSPITALIZATION: Luke said, with tears, that he is shocked that this happened to him. \"They are taking good care of me.\"   MEANING-MAKING: n/d   SPIRITUALITY/VALUES/Pentecostal: Luke is [employer]. He is connected to the [employer] in [place].   COPING/SPIRITUAL PRACTICES: I chanted the \"Elise Alston Gyo\" verse for healing. Luke said that he was thankful for that, and asked me to come everyday.   SUPPORT SYSTEMS: His daughter. His dad had the ?[employer] restaurant in [place].   PLAN: I will come to see Luke on Monday and chant for him again.    Rev. Patsy Lowe  Staff   Spiritual Health Services  Pager: [phone]  Office: [phone]      "

## 2017-04-14 ENCOUNTER — TELEPHONE (OUTPATIENT)
Dept: TRANSPLANT | Facility: CLINIC | Age: 52
End: 2017-04-14

## 2017-04-14 ENCOUNTER — APPOINTMENT (OUTPATIENT)
Dept: CARDIOLOGY | Facility: CLINIC | Age: 52
DRG: 228 | End: 2017-04-14
Attending: INTERNAL MEDICINE
Payer: COMMERCIAL

## 2017-04-14 ENCOUNTER — APPOINTMENT (OUTPATIENT)
Dept: OCCUPATIONAL THERAPY | Facility: CLINIC | Age: 52
DRG: 228 | End: 2017-04-14
Attending: INTERNAL MEDICINE
Payer: COMMERCIAL

## 2017-04-14 ENCOUNTER — APPOINTMENT (OUTPATIENT)
Dept: GENERAL RADIOLOGY | Facility: CLINIC | Age: 52
DRG: 228 | End: 2017-04-14
Attending: INTERNAL MEDICINE
Payer: COMMERCIAL

## 2017-04-14 LAB
ALBUMIN SERPL-MCNC: 2.3 G/DL (ref 3.4–5)
ALP SERPL-CCNC: 62 U/L (ref 40–150)
ALT SERPL W P-5'-P-CCNC: 57 U/L (ref 0–70)
ANION GAP SERPL CALCULATED.3IONS-SCNC: 10 MMOL/L (ref 3–14)
ANION GAP SERPL CALCULATED.3IONS-SCNC: 9 MMOL/L (ref 3–14)
AST SERPL W P-5'-P-CCNC: 29 U/L (ref 0–45)
BACTERIA SPEC CULT: ABNORMAL
BASE EXCESS BLDV CALC-SCNC: 0.4 MMOL/L
BASE EXCESS BLDV CALC-SCNC: 0.7 MMOL/L
BASE EXCESS BLDV CALC-SCNC: 0.9 MMOL/L
BASE EXCESS BLDV CALC-SCNC: 1.2 MMOL/L
BASE EXCESS BLDV CALC-SCNC: 1.2 MMOL/L
BASE EXCESS BLDV CALC-SCNC: 1.8 MMOL/L
BASE EXCESS BLDV CALC-SCNC: 2.4 MMOL/L
BILIRUB DIRECT SERPL-MCNC: 0.3 MG/DL (ref 0–0.2)
BILIRUB SERPL-MCNC: 0.7 MG/DL (ref 0.2–1.3)
BUN SERPL-MCNC: 35 MG/DL (ref 7–30)
BUN SERPL-MCNC: 36 MG/DL (ref 7–30)
CA-I BLD-MCNC: 4.2 MG/DL (ref 4.4–5.2)
CALCIUM SERPL-MCNC: 7.2 MG/DL (ref 8.5–10.1)
CALCIUM SERPL-MCNC: 7.4 MG/DL (ref 8.5–10.1)
CHLORIDE SERPL-SCNC: 105 MMOL/L (ref 94–109)
CHLORIDE SERPL-SCNC: 108 MMOL/L (ref 94–109)
CO2 SERPL-SCNC: 24 MMOL/L (ref 20–32)
CO2 SERPL-SCNC: 24 MMOL/L (ref 20–32)
CREAT SERPL-MCNC: 1.39 MG/DL (ref 0.66–1.25)
CREAT SERPL-MCNC: 1.5 MG/DL (ref 0.66–1.25)
ERYTHROCYTE [DISTWIDTH] IN BLOOD BY AUTOMATED COUNT: 18.1 % (ref 10–15)
GFR SERPL CREATININE-BSD FRML MDRD: 50 ML/MIN/1.7M2
GFR SERPL CREATININE-BSD FRML MDRD: 54 ML/MIN/1.7M2
GLUCOSE BLDC GLUCOMTR-MCNC: 102 MG/DL (ref 70–99)
GLUCOSE BLDC GLUCOMTR-MCNC: 106 MG/DL (ref 70–99)
GLUCOSE BLDC GLUCOMTR-MCNC: 112 MG/DL (ref 70–99)
GLUCOSE BLDC GLUCOMTR-MCNC: 119 MG/DL (ref 70–99)
GLUCOSE BLDC GLUCOMTR-MCNC: 121 MG/DL (ref 70–99)
GLUCOSE BLDC GLUCOMTR-MCNC: 123 MG/DL (ref 70–99)
GLUCOSE BLDC GLUCOMTR-MCNC: 127 MG/DL (ref 70–99)
GLUCOSE BLDC GLUCOMTR-MCNC: 132 MG/DL (ref 70–99)
GLUCOSE BLDC GLUCOMTR-MCNC: 137 MG/DL (ref 70–99)
GLUCOSE BLDC GLUCOMTR-MCNC: 139 MG/DL (ref 70–99)
GLUCOSE BLDC GLUCOMTR-MCNC: 150 MG/DL (ref 70–99)
GLUCOSE BLDC GLUCOMTR-MCNC: 159 MG/DL (ref 70–99)
GLUCOSE BLDC GLUCOMTR-MCNC: 189 MG/DL (ref 70–99)
GLUCOSE BLDC GLUCOMTR-MCNC: 190 MG/DL (ref 70–99)
GLUCOSE BLDC GLUCOMTR-MCNC: 193 MG/DL (ref 70–99)
GLUCOSE SERPL-MCNC: 132 MG/DL (ref 70–99)
GLUCOSE SERPL-MCNC: 178 MG/DL (ref 70–99)
HCO3 BLDV-SCNC: 25 MMOL/L (ref 21–28)
HCO3 BLDV-SCNC: 26 MMOL/L (ref 21–28)
HCO3 BLDV-SCNC: 27 MMOL/L (ref 21–28)
HCT VFR BLD AUTO: 26.3 % (ref 40–53)
HGB BLD-MCNC: 7.8 G/DL (ref 13.3–17.7)
HGB BLD-MCNC: 7.9 G/DL (ref 13.3–17.7)
LMWH PPP CHRO-ACNC: NORMAL IU/ML
MAGNESIUM SERPL-MCNC: 2.2 MG/DL (ref 1.6–2.3)
MAGNESIUM SERPL-MCNC: 2.3 MG/DL (ref 1.6–2.3)
MCH RBC QN AUTO: 30 PG (ref 26.5–33)
MCHC RBC AUTO-ENTMCNC: 29.7 G/DL (ref 31.5–36.5)
MCV RBC AUTO: 101 FL (ref 78–100)
MICRO REPORT STATUS: ABNORMAL
MICROORGANISM SPEC CULT: ABNORMAL
O2/TOTAL GAS SETTING VFR VENT: 21 %
O2/TOTAL GAS SETTING VFR VENT: 21 %
O2/TOTAL GAS SETTING VFR VENT: ABNORMAL %
O2/TOTAL GAS SETTING VFR VENT: NORMAL %
OXYHGB MFR BLDV: 46 %
OXYHGB MFR BLDV: 47 %
OXYHGB MFR BLDV: 49 %
OXYHGB MFR BLDV: 51 %
OXYHGB MFR BLDV: 54 %
OXYHGB MFR BLDV: 56 %
OXYHGB MFR BLDV: 62 %
PCO2 BLDV: 35 MM HG (ref 40–50)
PCO2 BLDV: 35 MM HG (ref 40–50)
PCO2 BLDV: 36 MM HG (ref 40–50)
PCO2 BLDV: 36 MM HG (ref 40–50)
PCO2 BLDV: 37 MM HG (ref 40–50)
PCO2 BLDV: 39 MM HG (ref 40–50)
PCO2 BLDV: 41 MM HG (ref 40–50)
PH BLDV: 7.41 PH (ref 7.32–7.43)
PH BLDV: 7.43 PH (ref 7.32–7.43)
PH BLDV: 7.44 PH (ref 7.32–7.43)
PH BLDV: 7.45 PH (ref 7.32–7.43)
PH BLDV: 7.45 PH (ref 7.32–7.43)
PH BLDV: 7.46 PH (ref 7.32–7.43)
PH BLDV: 7.48 PH (ref 7.32–7.43)
PLATELET # BLD AUTO: 127 10E9/L (ref 150–450)
PO2 BLDV: 29 MM HG (ref 25–47)
PO2 BLDV: 29 MM HG (ref 25–47)
PO2 BLDV: 31 MM HG (ref 25–47)
PO2 BLDV: 33 MM HG (ref 25–47)
PO2 BLDV: 34 MM HG (ref 25–47)
POTASSIUM SERPL-SCNC: 3.7 MMOL/L (ref 3.4–5.3)
POTASSIUM SERPL-SCNC: 4 MMOL/L (ref 3.4–5.3)
PROT SERPL-MCNC: 6.7 G/DL (ref 6.8–8.8)
RBC # BLD AUTO: 2.6 10E12/L (ref 4.4–5.9)
SODIUM SERPL-SCNC: 139 MMOL/L (ref 133–144)
SODIUM SERPL-SCNC: 142 MMOL/L (ref 133–144)
SPECIMEN SOURCE: ABNORMAL
TSH SERPL DL<=0.05 MIU/L-ACNC: 2.54 MU/L (ref 0.4–4)
WBC # BLD AUTO: 11.1 10E9/L (ref 4–11)

## 2017-04-14 PROCEDURE — 85520 HEPARIN ASSAY: CPT | Performed by: INTERNAL MEDICINE

## 2017-04-14 PROCEDURE — 76376 3D RENDER W/INTRP POSTPROCES: CPT | Mod: 26 | Performed by: INTERNAL MEDICINE

## 2017-04-14 PROCEDURE — 82330 ASSAY OF CALCIUM: CPT | Performed by: INTERNAL MEDICINE

## 2017-04-14 PROCEDURE — 25000125 ZZHC RX 250: Performed by: INTERNAL MEDICINE

## 2017-04-14 PROCEDURE — 80048 BASIC METABOLIC PNL TOTAL CA: CPT

## 2017-04-14 PROCEDURE — 83735 ASSAY OF MAGNESIUM: CPT | Performed by: INTERNAL MEDICINE

## 2017-04-14 PROCEDURE — 71010 XR CHEST PORT 1 VW: CPT

## 2017-04-14 PROCEDURE — 99152 MOD SED SAME PHYS/QHP 5/>YRS: CPT | Performed by: INTERNAL MEDICINE

## 2017-04-14 PROCEDURE — 82805 BLOOD GASES W/O2 SATURATION: CPT | Performed by: INTERNAL MEDICINE

## 2017-04-14 PROCEDURE — 25000132 ZZH RX MED GY IP 250 OP 250 PS 637

## 2017-04-14 PROCEDURE — 00000146 ZZHCL STATISTIC GLUCOSE BY METER IP

## 2017-04-14 PROCEDURE — 40000048 ZZH STATISTIC DAILY SWAN MONITORING

## 2017-04-14 PROCEDURE — 25000125 ZZHC RX 250: Performed by: PEDIATRICS

## 2017-04-14 PROCEDURE — 93312 ECHO TRANSESOPHAGEAL: CPT | Mod: 26 | Performed by: INTERNAL MEDICINE

## 2017-04-14 PROCEDURE — 85018 HEMOGLOBIN: CPT | Performed by: INTERNAL MEDICINE

## 2017-04-14 PROCEDURE — 97110 THERAPEUTIC EXERCISES: CPT | Mod: GO

## 2017-04-14 PROCEDURE — 93325 DOPPLER ECHO COLOR FLOW MAPG: CPT

## 2017-04-14 PROCEDURE — 25000128 H RX IP 250 OP 636: Performed by: PEDIATRICS

## 2017-04-14 PROCEDURE — 80048 BASIC METABOLIC PNL TOTAL CA: CPT | Performed by: INTERNAL MEDICINE

## 2017-04-14 PROCEDURE — 80076 HEPATIC FUNCTION PANEL: CPT | Performed by: INTERNAL MEDICINE

## 2017-04-14 PROCEDURE — 20000004 ZZH R&B ICU UMMC

## 2017-04-14 PROCEDURE — 84443 ASSAY THYROID STIM HORMONE: CPT

## 2017-04-14 PROCEDURE — 99291 CRITICAL CARE FIRST HOUR: CPT | Mod: GC | Performed by: INTERNAL MEDICINE

## 2017-04-14 PROCEDURE — 40000133 ZZH STATISTIC OT WARD VISIT

## 2017-04-14 PROCEDURE — S0171 BUMETANIDE 0.5 MG: HCPCS | Performed by: INTERNAL MEDICINE

## 2017-04-14 PROCEDURE — S0171 BUMETANIDE 0.5 MG: HCPCS

## 2017-04-14 PROCEDURE — 93325 DOPPLER ECHO COLOR FLOW MAPG: CPT | Mod: 26 | Performed by: INTERNAL MEDICINE

## 2017-04-14 PROCEDURE — 25000125 ZZHC RX 250

## 2017-04-14 PROCEDURE — 25000132 ZZH RX MED GY IP 250 OP 250 PS 637: Performed by: INTERNAL MEDICINE

## 2017-04-14 PROCEDURE — 27210437 ZZH NUTRITION PRODUCT SEMIELEM INTERMED LITER

## 2017-04-14 PROCEDURE — 25000128 H RX IP 250 OP 636: Performed by: INTERNAL MEDICINE

## 2017-04-14 PROCEDURE — 40000275 ZZH STATISTIC RCP TIME EA 10 MIN

## 2017-04-14 PROCEDURE — 40000196 ZZH STATISTIC RAPCV CVP MONITORING

## 2017-04-14 PROCEDURE — 85018 HEMOGLOBIN: CPT

## 2017-04-14 PROCEDURE — 93320 DOPPLER ECHO COMPLETE: CPT | Mod: 26 | Performed by: INTERNAL MEDICINE

## 2017-04-14 PROCEDURE — 83735 ASSAY OF MAGNESIUM: CPT

## 2017-04-14 PROCEDURE — 25000132 ZZH RX MED GY IP 250 OP 250 PS 637: Performed by: STUDENT IN AN ORGANIZED HEALTH CARE EDUCATION/TRAINING PROGRAM

## 2017-04-14 PROCEDURE — 40000986 XR ABDOMEN PORT F1 VW

## 2017-04-14 PROCEDURE — 84132 ASSAY OF SERUM POTASSIUM: CPT | Performed by: INTERNAL MEDICINE

## 2017-04-14 PROCEDURE — 85027 COMPLETE CBC AUTOMATED: CPT | Performed by: INTERNAL MEDICINE

## 2017-04-14 PROCEDURE — 40000076 ZZH STATISTIC IABP MONITORING

## 2017-04-14 RX ORDER — HYDRALAZINE HYDROCHLORIDE 50 MG/1
50 TABLET, FILM COATED ORAL EVERY 6 HOURS
Status: DISCONTINUED | OUTPATIENT
Start: 2017-04-14 | End: 2017-04-14

## 2017-04-14 RX ORDER — FLUMAZENIL 0.1 MG/ML
0.2 INJECTION, SOLUTION INTRAVENOUS
Status: ACTIVE | OUTPATIENT
Start: 2017-04-14 | End: 2017-04-16

## 2017-04-14 RX ORDER — BUMETANIDE 0.25 MG/ML
1 INJECTION INTRAMUSCULAR; INTRAVENOUS ONCE
Status: COMPLETED | OUTPATIENT
Start: 2017-04-14 | End: 2017-04-14

## 2017-04-14 RX ORDER — NALOXONE HYDROCHLORIDE 0.4 MG/ML
.1-.4 INJECTION, SOLUTION INTRAMUSCULAR; INTRAVENOUS; SUBCUTANEOUS
Status: ACTIVE | OUTPATIENT
Start: 2017-04-14 | End: 2017-04-16

## 2017-04-14 RX ORDER — FENTANYL CITRATE 50 UG/ML
50-100 INJECTION, SOLUTION INTRAMUSCULAR; INTRAVENOUS
Status: COMPLETED | OUTPATIENT
Start: 2017-04-14 | End: 2017-04-14

## 2017-04-14 RX ORDER — FENTANYL CITRATE 50 UG/ML
25-50 INJECTION, SOLUTION INTRAMUSCULAR; INTRAVENOUS
Status: ACTIVE | OUTPATIENT
Start: 2017-04-14 | End: 2017-04-15

## 2017-04-14 RX ORDER — SODIUM CHLORIDE 9 MG/ML
INJECTION, SOLUTION INTRAVENOUS CONTINUOUS PRN
Status: DISCONTINUED | OUTPATIENT
Start: 2017-04-14 | End: 2017-04-19 | Stop reason: HOSPADM

## 2017-04-14 RX ORDER — BUMETANIDE 0.25 MG/ML
2 INJECTION INTRAMUSCULAR; INTRAVENOUS ONCE
Status: COMPLETED | OUTPATIENT
Start: 2017-04-14 | End: 2017-04-14

## 2017-04-14 RX ADMIN — QUETIAPINE FUMARATE 25 MG: 25 TABLET, FILM COATED ORAL at 21:00

## 2017-04-14 RX ADMIN — CLOPIDOGREL 75 MG: 75 TABLET, FILM COATED ORAL at 08:16

## 2017-04-14 RX ADMIN — HYDRALAZINE HYDROCHLORIDE 75 MG: 50 TABLET ORAL at 21:00

## 2017-04-14 RX ADMIN — BUMETANIDE 1 MG: 0.25 INJECTION INTRAMUSCULAR; INTRAVENOUS at 02:44

## 2017-04-14 RX ADMIN — PANTOPRAZOLE SODIUM 40 MG: 40 INJECTION, POWDER, FOR SOLUTION INTRAVENOUS at 19:41

## 2017-04-14 RX ADMIN — LIDOCAINE HYDROCHLORIDE 15 ML: 20 SOLUTION ORAL; TOPICAL at 14:59

## 2017-04-14 RX ADMIN — HYDRALAZINE HYDROCHLORIDE 50 MG: 50 TABLET ORAL at 08:16

## 2017-04-14 RX ADMIN — PANTOPRAZOLE SODIUM 40 MG: 40 INJECTION, POWDER, FOR SOLUTION INTRAVENOUS at 10:22

## 2017-04-14 RX ADMIN — POTASSIUM CHLORIDE 20 MEQ: 1.5 POWDER, FOR SOLUTION ORAL at 05:26

## 2017-04-14 RX ADMIN — ASPIRIN 81 MG CHEWABLE TABLET 81 MG: 81 TABLET CHEWABLE at 08:16

## 2017-04-14 RX ADMIN — OXYCODONE HYDROCHLORIDE 5 MG: 5 TABLET ORAL at 20:57

## 2017-04-14 RX ADMIN — FENTANYL CITRATE 25 MCG: 50 INJECTION INTRAMUSCULAR; INTRAVENOUS at 15:08

## 2017-04-14 RX ADMIN — TOPICAL ANESTHETIC 1 ML: 200 SPRAY DENTAL; PERIODONTAL at 14:59

## 2017-04-14 RX ADMIN — MULTIVIT AND MINERALS-FERROUS GLUCONATE 9 MG IRON/15 ML ORAL LIQUID 15 ML: at 10:22

## 2017-04-14 RX ADMIN — OXYCODONE HYDROCHLORIDE 5 MG: 5 TABLET ORAL at 11:50

## 2017-04-14 RX ADMIN — DOBUTAMINE 5 MCG/KG/MIN: 12.5 INJECTION, SOLUTION, CONCENTRATE INTRAVENOUS at 16:19

## 2017-04-14 RX ADMIN — MIDAZOLAM 0.5 MG: 1 INJECTION INTRAMUSCULAR; INTRAVENOUS at 15:09

## 2017-04-14 RX ADMIN — HYDRALAZINE HYDROCHLORIDE 75 MG: 50 TABLET ORAL at 16:17

## 2017-04-14 RX ADMIN — HUMAN INSULIN 5 UNITS/HR: 100 INJECTION, SOLUTION SUBCUTANEOUS at 02:06

## 2017-04-14 RX ADMIN — BUMETANIDE 2 MG: 0.25 INJECTION INTRAMUSCULAR; INTRAVENOUS at 14:58

## 2017-04-14 RX ADMIN — HUMAN INSULIN 3 UNITS/HR: 100 INJECTION, SOLUTION SUBCUTANEOUS at 21:09

## 2017-04-14 RX ADMIN — POTASSIUM CHLORIDE 20 MEQ: 1.5 POWDER, FOR SOLUTION ORAL at 19:00

## 2017-04-14 RX ADMIN — ACETAMINOPHEN 325 MG: 325 TABLET, FILM COATED ORAL at 23:01

## 2017-04-14 ASSESSMENT — PAIN DESCRIPTION - DESCRIPTORS: DESCRIPTORS: ACHING

## 2017-04-14 NOTE — PROGRESS NOTES
MD verbals orders to allow pt to rest and get hemodynamic #s and full assessment at 0200.  Pt arrived back from cath lab at 2124. Labs and #s taken upon pt arrival.

## 2017-04-14 NOTE — PLAN OF CARE
Problem: Goal Outcome Summary  Goal: Goal Outcome Summary  Outcome: No Change  Pt A&Ox4 with intermittent confusion periods when awoken from sleep, when awake, appropriately follows commands and answers questions. Afebrile. Placed on 2L NC sats %, LS clear diminished bases. ST -120s with no ectopy, MAP 50-70s, pressures get soft at night with MAP in 50s with pt asleep, MD notified, no orders at this time. CVP 11/14/16, PA (36-44)/(24-30), SVO2 47-54, CO 3.6-4.4, CI 2.3-2.7, and -1236. IABP replaced in cath lab to R groin continues 1:1 100% augmentation with correlating MAPs. Pt arrived back to room at 2124 with 5 Dobutamine running and nitroprusside running at 1, MD orders to titrate off.  1mg Bumex given with CVP up to 14, uop 40-260ml/hr. No BMs overnight.  TF at 45ml/hr with FWF 25ml q2hr. Slept well with seroquel and 5mg Oxycodone. Continues on Insulin gtt at 1-2. Electrolytes replaced, will continue to update MD with any changes in condition per protocol.

## 2017-04-14 NOTE — PROCEDURES
PRELIMINARY CARDIAC CATH REPORT:     PROCEDURES PERFORMED:   1. Intra-aortic balloon pump placement.  2. Left heart catheterization.       PHYSICIANS:  1. Attending Interventional Cardiology Staff: Donnie Garner MD  2. Interventional Cardiology Fellow: Karl Dorsey DO  3. Cardiology Fellow: Kirby Rodriguez MD     INDICATION:  Luke Henao is a 51 year old male with advanced heart failure.     DESCRIPTION:  1. Consent obtained with discussion of risks.  All questions were answered.  2. Sterile prep and procedure.  3. Location: right common femoral artery.  4. Access: Local anesthetic with lidocaine.  A standard (18 g) needle with ultrasound guidance was used to establish vascular access using a modified Seldinger technique.  5. Sheath: 4Fr sheath for left heart cath and arterial pressure monitoring prior to IABP. 8Fr standard sheath.  6. Catheters: 4Fr Angled Pigtail.  7. Fluoroscopy time of 2 min.  8. Estimated blood loss of <10 mL.  9. See below for procedure details.    MEDICATIONS:  1. Conscious sedation with fentanyl and midazolam as directed by the attending cardiologist.  2. IV Nitroprusside    CONSCIOUS SEDATION:   The procedure was performed under conscious sedation for 46 min.  A total of 0 mg Versed and 50 mcg Fentanyl were used. Heart rate, blood pressure, respiration, oxygen saturation, and patient responses were monitored throughout the procedure with RN assistance.       HEMODYNAMICS:  1.  bpm  2. Ao /63/79 mmHg    RIGHT HEART CATHETERIZATION:  Baseline:   PA sat 55%   Gene CO 4.5   Gene CI 2.9     Drug Study 1 (Nitroprusside):   PA sat 51.6  Gene CO 4.4   GENE CI = 2.8    Drug Study 2 (nitroprusside and dobutamine):   PA sat 53.0  Gene CO = 4.3  GENE CI 2.7        LEFT HEART CATHETERIZATION:  1. LVEDP 30 mmHg.  2. No gradient across the aortic valve on pull back.          INTRA-AORTIC BALLOON PUMP INSERTION:  1. A 7.5Fr 50 mL IABP was inserted into the right femoral artery under  fluoroscopic guidance.      COMPLICATIONS:  1. None    SUMMARY:   1. Advanced heart failure.  2. Left ventricle with LVEDP of 30 mmHg and no evidence of aortic stenosis.  3. Successful placement of a 40cc IABP in the RFA. Sheath was secured in place.    PLAN:   1. Return to the primary inpatient team for further evaluation and management.        The attending interventional cardiologist was present for the entire procedure.      See CVIS report for final draft.    Karl Dorsey DO, [facility]  Interventional Cardiology Fellow  [phone]

## 2017-04-14 NOTE — PROGRESS NOTES
Social Work Services Progress Note    Hospital Day: 19  Date of Initial Social Work Evaluation:  3/28/17  Collaborated with:  Patient, pt's dtr Camtu, pt's son, Nursing, team rounds, chart review    Data:    Pt remains in ICU for LVAD w/u.    Intervention:    Met with pt and two of his children per their request.  Provided letters for both Camtu and for pt d/t needing to cancel airplane tickets d/t pt's admission.    Pt states directly to SW that he would like all four of his children to be able to make medical decisions and sign consents in addition to his wife.  Pt was unable to sign a HCD today d/t pending procedure.  Will need to f/u with Turkish  to complete HCD stating above.    Assessment:   Pt and his children appear to be adjusting appropriately and his children remain actively involved and supportive.  They voice appreciation of assistance.    Plan:    Anticipated Disposition:  TBD pending medical course.    Barriers to d/c plan:  Medical stability, LVAD w/u    Follow Up:  SW will f/u re: HCD request when pt is appropriate to sign document.    LUIS Villafana, [facility]  Adult ICU Clinical   Pager [phone]

## 2017-04-14 NOTE — PROGRESS NOTES
"[facility]  Cardiology II Service / Advanced Heart Failure  Daily Note        Interval History:   - His lactate started rising, tachycardia worsened and became somewhat more drowsy after taking out his IABP yesterday  - Underwent repeat IABP placement   - No more maroon BMs overnight, Hb stable, off heparin inf  - Had to decrease Hydral to 50 q6 overnight  - Mentation looks more clear this AM, compared to last night      Pertinent Medications:  I have reviewed this patient's current medications      hydrALAZINE  50 mg Oral Q6H     lidocaine  15 mL Mouth/Throat Once     Benzocaine  1-4 spray Mouth/Throat Once     midazolam  1-2 mg Intravenous Once within 24 hrs     fentaNYL   mcg Intravenous Once within 24 hrs     QUEtiapine  25 mg Oral At Bedtime     pantoprazole  40 mg Intravenous BID     pneumococcal vaccine  0.5 mL Intramuscular Once     sodium chloride (PF)  3 mL Intracatheter Q8H     multivitamins with minerals  15 mL Per Feeding Tube Daily     clopidogrel  75 mg Oral or NG Tube Daily     aspirin  81 mg Oral or Feeding Tube Daily         Examination:  BP 99/53  Temp 98.4  F (36.9  C)  Resp 16  Ht 1.575 m (5' 2.01\")  Wt 58.6 kg (129 lb 3 oz)  SpO2 98%  BMI 23.62 kg/m2  GEN: A, cooperative and conversational   NECK: can not assess JVP   RESP: crackles   CV: S1S2S3, holossystolic murmur at the apex  ABD: Soft, nontender to palpation, no HSM, +BS, no guarding  EXT: No peripheral edema, warm/well perfused   NEURO: CN II-XII intact, normal 5/5 strength in all extremitites  SKIN: Normal skin turgor, no rash on limited exam    Data:  CMP  Recent Labs  Lab 04/14/17  0407 04/13/17  2205 04/13/17  1615 04/13/17  0958 04/13/17  0306  04/12/17  0352  04/11/17  0254    140 140  --  141  < > 141  < > 146*   POTASSIUM 4.0 4.5 4.0 3.8 4.0  < > 3.7  < > 3.2*   CHLORIDE 108 108 106  --  106  < > 102  < > 101   CO2 24 22 24  --  26  < > 31  < > 37*   ANIONGAP 10 10 10  --  9 "  < > 7  < > 8   * 118* 160*  --  118*  < > 110*  < > 100*   BUN 36* 32* 31*  --  35*  < > 46*  < > 62*   CR 1.50* 1.39* 1.34*  --  1.42*  < > 1.50*  < > 1.71*   GFRESTIMATED 50* 54* 56*  --  53*  < > 50*  < > 43*   GFRESTBLACK 60* 66 68  --  64  < > 60*  < > 52*   ROB 7.2* 7.4* 7.3*  --  7.5*  < > 7.8*  < > 8.4*   MAG 2.3 2.2 2.3 2.3 2.2  < > 2.6*  < > 2.6*   PHOS  --  2.8  --  3.6  --   --  3.8  --  3.9   PROTTOTAL 6.7*  --   --   --  6.9  --  6.4*  --  6.9   ALBUMIN 2.3*  --   --   --  2.5*  --  2.4*  --  2.6*   BILITOTAL 0.7  --   --   --  1.1  --  1.0  --  0.7   ALKPHOS 62  --   --   --  64  --  69  --  88   AST 29  --   --   --  30  --  30  --  47*   ALT 57  --   --   --  72*  --  83*  --  116*   < > = values in this interval not displayed.  CBC    Recent Labs  Lab 04/14/17  0407 04/13/17  2205 04/13/17  1425 04/13/17  0306  04/12/17  0352   WBC 11.1* 11.2*  --  11.7*  --  11.6*   RBC 2.60* 2.54*  --  2.75*  --  2.71*   HGB 7.8* 7.6* 8.2* 8.4*  < > 8.1*   HCT 26.3* 25.5*  --  27.2*  --  26.8*   * 100  --  99  --  99   MCH 30.0 29.9  --  30.5  --  29.9   MCHC 29.7* 29.8*  --  30.9*  --  30.2*   RDW 18.1* 18.0*  --  18.2*  --  18.2*   * 130*  --  121*  --  132*   < > = values in this interval not displayed.  INR    Recent Labs  Lab 04/12/17  0352 04/11/17  0254 04/10/17  0301 04/09/17  0317   INR 1.12 1.07 1.12 1.19*     Arterial Blood Gas    Recent Labs  Lab 04/14/17  1157 04/14/17  0757 04/14/17  0407 04/14/17  0122   O2PER 21.0 2L NC 2LNC 2LNC       Imaging Studies:  Echo 3/27  The visual ejection fraction is estimated at 30-35%.  There is extensive inferior, inferoseptal, and inferolateral wall akinesis.  Basal/mid lateral wall hypokinesis  There is moderate to severe septal hypokinesis.  Septal wall motion abnormality may reflect pacemaker activation.  There is a catheter/pacemaker lead seen in the right ventricle.  The right ventricle is mildly dilated.  Severely decreased right  ventricular systolic function  There is no pericardial effusion.  The rhythm was paced.  Compared to the prior study, the LVEF appears somewhat decreased. Septal wall  motion abnormality larger- may reflect, in part, that patient in sinus tach on  prior study, and ventricular paced now. No hemodynamically significant  valvular abnormalities on 2D or color flow imaging     CT chest/abdomen 3/27   IMPRESSION:   1. Small mediastinal hemorrhage, small bilateral pleural effusions  which may be hemorrhagic. Small intraperitoneal hemorrhage. Minimal  pericardial hemorrhage.  2. Minimal pneumoperitoneum in the left paracolic gutter, of unknown  significance. No pneumatosis as clinically questioned.  3. IABP slightly low in position.  4. Bilateral pulmonary dependent consolidations represent compression  atelectasis, however differentials include aspiration.     Cath 3/26 [facility]  SUMMARY:   --Inferior STEMI w/ late presentation. Culprit dictated by complete  heart block, and cardiogenic shock. Emergent echo in the Cath Lab also  shows significant RV dysfunction.  --Three vessel coronary artery disease with diffuse coronary disease  out to the distal vessels  --Successful POBA of the prox and mid-RCA. Stent was not placed, in  anticipation he may need to be considered for bypass surgery in the  near future  --Insertion of a temporary pacemaker for bradycardia (AVB) and  hypotension  --Insertion of a IABP  --Upper GI bleed consistent with Kaylin-[place]     [facility] 3/27     CT head 3/28: normal      Echo 3/29  Interpretation Summary  Limited study. Ischemic CM. Moderately (EF 30-35%) reduced left ventricular  function is present. Traced at 32%.  Posterior wall akinesis is present. Lateral wall akinesis is present. Inferior  wall akinesis is present.  Right ventricular function, chamber size, wall motion, and thickness are  normal.  Dilation of the inferior vena cava is present with abnormal  respiratory  variation in diameter.     Compared to prior study, RV fxn is improved.     Echo 3/31  Left Ventricle  The Ejection Fraction is estimated at 50-55%. Inferior,posterior,lateral,basal  septal wall akinesis as reported before. No change.     CORONARY ANGIOGRAM 4/1:   1. Both coronary arteries arise from their respective cusps.  2. Right dominant.  3. LM has mild luminal irregularities.   4. LAD supplies the apex along with the RCA (type 2 LAD) and gives rise to septal perforators and a large and branching D1. There are widely patent stents extending from the proximal to mid LAD. The dLAD has mild to moderate disease to 30% stenosis. The D1 is jailed by the LAD stents, but has EBER 3 flow with disease to 30% stenosis.   5. The small ramus is no longer present.  6. LCX is occluded at the ostium.   7. RCA gives rise to PL branches and supplies PDA. There are widely patent stents extending from the proximal to mid RCA. The remainder of the mRCA has disease to 50% stenosis and the dRCA has disease to 10% stenosis. The RPDA has a widely patent stent and the remainder of the artery has mild disease to 10% stenosis. The RPLA has small vessels with diffuse disease including a distal branch occlusion. The RPLA supplies some small collateral branches to the dLCx.      COMPLICATIONS:  1. None      SUMMARY:   1. Cardiogenic shock following an inferior STEMI.  2. The LCx re-occlusion is not surprising as the recently revascularized LCx  had poor outflow and the revascularized portion did not supply a large territory (limited to no flow was present distal to the stented segment immediately following stenting). Repeat revascularization of the LCx would result in the same outcome of repeat closure and also risk embolizing thrombus from the LCx into the LAD so no treatment of the occluded LCx was performed.   3. Three vessel coronary artery disease with patent LAD and RCA stents and an occluded LCx.     Echo 4/3  Left  ventricular size is normal.  The Ejection Fraction is estimated at 35-40%.  Inferior wall akinesis is present.  Lateral wall akinesis is present.  Posterior wall akinesis is present.  Right ventricular function, chamber size, wall motion, and thickness are  normal.  Moderate mitral insufficiency is present.  Moderate tricuspid insufficiency is present.  Right ventricular systolic pressure is 47mmHg above the right atrial pressure.  The inferior vena cava is normal.     Echo 4/5  Moderately (EF 35-40%) reduced left ventricular function is present. Traced at  40%.  Moderate mitral insufficiency is present.  The cause of the mitral insufficiency appears to be restricted posterior  leaflet from posterior MI. No mitral leaflet pathology seen.  Dilation of the inferior vena cava is present with abnormal respiratory  variation in diameter.     CT abd without contrast 4/4  IMPRESSION:   1. No evidence of ischemic bowel, obstruction, or intra-abdominal  infection.  2. Bibasilar patchy pulmonary opacities likely represent combination  of aspiration and atelectasis.  3. Small amount of simple free fluid within the lower abdomen.  4. Small left retroperitoneal hematoma along the iliac vasculature  likely postprocedural.   5. Unchanged size of bilateral pleural effusions, which now consist of  simple fluid.    Assessment and Plan  51 year old man presented with cardiogenic shock from inferior STEMI s/p RCA aspiration thrombectomy and POBA on 3/26 at [facility] s/p IABP and initiation of Dopamine, also during procedure, CHB s/p temp pacer, emesis/hematmesis and altered mental status s/p intubation transferred here for consideration of mechanical support on 3/27. Had PCI to RCA, LAD and LCx (on ASA and plavix) started on epinephrine in addition to dopamine, post procedure developed distributive shock picture from infection (aspiration pneumonia? Sputum cx growing staph aureus, on vanco and zosyn) vs post-MI SIRS response. Currently  in cardiogenic shock with IABP in situ    Card  # Cardiogenic shock 4/3- from underlying 3v CAD-has had high SVR and low CI, complicated by ischemic MR  # Due to inferior wall STEMI. S/P 7 stents to LAD, circ, RCA (angiogram report pending). Now with IABP, temporary pacemaker after 3rd deg heart block in cath lab at [facility] on 3/26   # Small pericardial hemorrhage   - IABP in place  - Hydral 75 mg q6H, once his SCr improves will consider starting Nipride for a more stable afterload reduction regimen, and give him an IABP wean trial  - Hold off on isordil for now  - LVAD work up given SCr improving  - Plan to keep CVP 9-12 today, net 500 cc to 1 L negative  - SAMEERA to better evaluate MR and LV function  - Discuss with CVTS re subclavian IABP placement      # Neuro  - alert, follows commands  - WBC down trending  - Seroquel 25 mg         Respiratory  #Intubated for airway protection due to mental status and emesis on 3/26, extubated 4/3  #Probable Aspiration PNA/pneumonitis vs MSSA pneumonia         # ID  - fever on 4/3-type 2 MI vs infection (unclear source)  - Sepsis from aspiration pneumonia/pneumonitis vs MSSA pneumonia vs post-MI SIRS response initially on 3/28  - repeat cultures if febrile  - He was on Unasyn. Switched to vanco and zosyn (4/3 - 4/7) for fever and elevated lactate concern for sepsis  - Currently off antibiotics         # Endocrine  T2DM: HA1C 7.5 on admission.  - Insulin gtt per nurising protocol with hypoglycemia precautions.       # GI  - Had several maroon BMs on 4/11 and 4/12, Hb overall dropped by a gram or less, heparin inf held and GI consulted  - Will monitor Hb  - Consider scope if hemodynamically unstable, or if his Hb continues to drop  - Protonix 40 IV BID     Minimal pneumoperitoneum, unclear etiology: Seen by General surgery.  - had bloody NG output earlier in hospitalization.  - Conservative Mx for now        # Renal/  Renal function slightly improved- from hypoperfusion from  cardiogenic shock 4/5 plus central venous congestion plus contrast nephropathy  -Diuresis  - Daily Cr  - Avoid nephrotoxins as able  - Plan to make 500cc to 1L net negative today     -Hypernatremia  - Resolved. Stopped his D5W, only getting FWF's.               FEN: feeding tube  Code: FULL  PPx: PPI 40mg daily, senna PRN  Dispo: pending stability        Plan of care discussed with Dr. Estiven Loving MD  Cardiovascular Disease Fellow  Pager: [phone]      I have reviewed today's vital signs, notes, medications, labs and imaging.  I have also seen and examined the patient and agree with the findings and plan as outlined above.  Pt without complaints.  T 100.3 with  and CVP 16 CI 2.3 on Dbx 5 and IABP 1:1.  Lungs clear and Tachy S1 and S2 with loud III/VI HSM.  Abd is soft.  Labs with Cr 1.5 and Hgb 7.8.  Assessment: Pt with IWMI complicated by MR and LV dysfn requiring repeated placement of IABP.  Will trial milrinone therapy for increased afterload reduction and consider SC IABP placement.  SAMEERA today to assess MV.  Total critical care time 40 min.     Vikram Jean-Baptiste MD, PhD  Professor, Heart Failure and Cardiac Transplantation  [facility]

## 2017-04-14 NOTE — PROGRESS NOTES
GASTROENTEROLOGY LUMINAL PROGRESS NOTE       ASSESSMENT:  51 year old male with DMII, newly diagnosed CAD s/p STEMI on 3/26 and subsequent PCI, balloon pump use complicated by distributive and cardiogenic shock who we are asked to see for maroon stools.      # Gastrointestinal bleeding. Based minimal change in vital signs and hemoglobin, most likely this is a lower source such as mild ischemic colitis or rectal ulcer 2/2 rectal tube placement previously. CT A/P showed small/stable RP hemorrhage, no bowel thickening. There are notes of possible hematemesis on initial admission but he should be more unstable to have maroon stool due to an upper source.  Had been on heparin gtt, ASA, clopidogrel. Heparin stopped 4/11, with initial cessation of maroon stools, however have reoccurred overnight 4/12.   Hgb slow drift,   Likely a drop at the time of the bleed now stable once again. No BM today yet.       Recommendations  -- diet as tolerated per Cardiology  -- continue to hold heparin today, restart tomorrow if possible without a bolus. If he had recurrent bleeding on heparin would do flexible sigmoidoscopy for further evaluation   -- daily hemoglobin checks for now  -- daily PPI reasonable while on dual antiplatelet therapies      Gastroenterology follow up recommendations: follow daily inpatient for now     Thank you for involving us in this patient's care. Please do not hesitate to contact the GI service with any questions or concerns.      Pt care plan discussed with Dr. Ken, GI staff physician.     Narayan Carballo MD  Gastroenterology Fellow  #[id number]    _______________________________________________________________  S: No BM overnight. No abdominal pain. Feeling well. Had balloon pump replaced yesterday.     O:  Temp:  [98.4  F (36.9  C)-100.3  F (37.9  C)] 98.4  F (36.9  C)  Heart Rate:  [107-130] 113  Resp:  [16-23] 18  BP: ()/(41-75) 89/43  FiO2 (%):  [2 %] 2 %  SpO2:  [94 %-100 %] 99 %    Physical  Exam  Gen: NAD, lying in CVICU bed  CV: RRR, holosystolic murmer at apex.   Resp: CTAB, no crackles or wheezes.   Abdominal: Soft, non-distended, no TTP, no HSM, +BS.   Extremities: Right groin catheter in place with no bleeding.   Neuro: A&Ox3, moves all four extremities.      LABS:  BMP    Recent Labs  Lab 04/14/17 0407 04/13/17 2205 04/13/17  1615 04/13/17  0958 04/13/17  0306    140 140  --  141   POTASSIUM 4.0 4.5 4.0 3.8 4.0   CHLORIDE 108 108 106  --  106   ROB 7.2* 7.4* 7.3*  --  7.5*   CO2 24 22 24  --  26   BUN 36* 32* 31*  --  35*   CR 1.50* 1.39* 1.34*  --  1.42*   * 118* 160*  --  118*     CBC  Recent Labs  Lab 04/14/17 0407 04/13/17 2205 04/13/17 0306  04/12/17  0352   WBC 11.1* 11.2*  --  11.7*  --  11.6*   RBC 2.60* 2.54*  --  2.75*  --  2.71*   HGB 7.8* 7.6*  < > 8.4*  < > 8.1*   HCT 26.3* 25.5*  --  27.2*  --  26.8*   * 100  --  99  --  99   MCH 30.0 29.9  --  30.5  --  29.9   MCHC 29.7* 29.8*  --  30.9*  --  30.2*   RDW 18.1* 18.0*  --  18.2*  --  18.2*   * 130*  --  121*  --  132*   < > = values in this interval not displayed.  INR    Recent Labs  Lab 04/12/17  0352 04/11/17  0254 04/10/17  0301 04/09/17  0317   INR 1.12 1.07 1.12 1.19*     LFTs    Recent Labs  Lab 04/14/17 0407 04/13/17  0306 04/12/17  0352 04/11/17  0254   ALKPHOS 62 64 69 88   AST 29 30 30 47*   ALT 57 72* 83* 116*   BILITOTAL 0.7 1.1 1.0 0.7   PROTTOTAL 6.7* 6.9 6.4* 6.9   ALBUMIN 2.3* 2.5* 2.4* 2.6*      PANCNo lab results found in last 7 days.

## 2017-04-14 NOTE — PLAN OF CARE
Problem: Goal Outcome Summary  Goal: Goal Outcome Summary  OT 4E: Pt with improved activity tolerance noted during today's session. Pt alert and oriented x3 however continues to have difficulty following complex 2-step commands. Pt tolerated B ankle and LLE AROM supine exercises x15-20 reps. Pt engaged in UE strengthening x15-20 reps. Pt tolerated exercises well,  O2 >93%.      Defer discharge recommendations at this time as pt with pending LVAD placement.

## 2017-04-14 NOTE — PLAN OF CARE
Problem: Goal Outcome Summary  Goal: Goal Outcome Summary  Tx canceled due to pt having a procedure- test completed during SLP scheduled tx time with an - will reattempt tomorrow

## 2017-04-15 ENCOUNTER — APPOINTMENT (OUTPATIENT)
Dept: GENERAL RADIOLOGY | Facility: CLINIC | Age: 52
DRG: 228 | End: 2017-04-15
Attending: INTERNAL MEDICINE
Payer: COMMERCIAL

## 2017-04-15 ENCOUNTER — APPOINTMENT (OUTPATIENT)
Dept: SPEECH THERAPY | Facility: CLINIC | Age: 52
DRG: 228 | End: 2017-04-15
Attending: INTERNAL MEDICINE
Payer: COMMERCIAL

## 2017-04-15 LAB
ALBUMIN SERPL-MCNC: 2.3 G/DL (ref 3.4–5)
ALBUMIN UR-MCNC: NEGATIVE MG/DL
ALP SERPL-CCNC: 71 U/L (ref 40–150)
ALT SERPL W P-5'-P-CCNC: 47 U/L (ref 0–70)
ANION GAP SERPL CALCULATED.3IONS-SCNC: 11 MMOL/L (ref 3–14)
ANION GAP SERPL CALCULATED.3IONS-SCNC: 8 MMOL/L (ref 3–14)
APPEARANCE UR: ABNORMAL
AST SERPL W P-5'-P-CCNC: 27 U/L (ref 0–45)
BACTERIA #/AREA URNS HPF: ABNORMAL /HPF
BASE DEFICIT BLDV-SCNC: 1 MMOL/L
BASE DEFICIT BLDV-SCNC: 1.1 MMOL/L
BASE EXCESS BLDV CALC-SCNC: 0.4 MMOL/L
BASE EXCESS BLDV CALC-SCNC: 0.5 MMOL/L
BASE EXCESS BLDV CALC-SCNC: 1.1 MMOL/L
BILIRUB DIRECT SERPL-MCNC: 0.3 MG/DL (ref 0–0.2)
BILIRUB SERPL-MCNC: 0.8 MG/DL (ref 0.2–1.3)
BILIRUB UR QL STRIP: NEGATIVE
BUN SERPL-MCNC: 35 MG/DL (ref 7–30)
BUN SERPL-MCNC: 39 MG/DL (ref 7–30)
CA-I BLD-MCNC: 4.2 MG/DL (ref 4.4–5.2)
CALCIUM SERPL-MCNC: 7.1 MG/DL (ref 8.5–10.1)
CALCIUM SERPL-MCNC: 7.3 MG/DL (ref 8.5–10.1)
CHLORIDE SERPL-SCNC: 105 MMOL/L (ref 94–109)
CHLORIDE SERPL-SCNC: 105 MMOL/L (ref 94–109)
CO2 SERPL-SCNC: 24 MMOL/L (ref 20–32)
CO2 SERPL-SCNC: 25 MMOL/L (ref 20–32)
COLOR UR AUTO: YELLOW
CORTIS SERPL-MCNC: 27.3 UG/DL (ref 4–22)
CREAT SERPL-MCNC: 1.42 MG/DL (ref 0.66–1.25)
CREAT SERPL-MCNC: 1.45 MG/DL (ref 0.66–1.25)
ERYTHROCYTE [DISTWIDTH] IN BLOOD BY AUTOMATED COUNT: 17.6 % (ref 10–15)
ERYTHROCYTE [DISTWIDTH] IN BLOOD BY AUTOMATED COUNT: 17.7 % (ref 10–15)
GFR SERPL CREATININE-BSD FRML MDRD: 52 ML/MIN/1.7M2
GFR SERPL CREATININE-BSD FRML MDRD: 53 ML/MIN/1.7M2
GLUCOSE BLDC GLUCOMTR-MCNC: 106 MG/DL (ref 70–99)
GLUCOSE BLDC GLUCOMTR-MCNC: 124 MG/DL (ref 70–99)
GLUCOSE BLDC GLUCOMTR-MCNC: 125 MG/DL (ref 70–99)
GLUCOSE BLDC GLUCOMTR-MCNC: 136 MG/DL (ref 70–99)
GLUCOSE BLDC GLUCOMTR-MCNC: 136 MG/DL (ref 70–99)
GLUCOSE BLDC GLUCOMTR-MCNC: 137 MG/DL (ref 70–99)
GLUCOSE BLDC GLUCOMTR-MCNC: 142 MG/DL (ref 70–99)
GLUCOSE BLDC GLUCOMTR-MCNC: 155 MG/DL (ref 70–99)
GLUCOSE BLDC GLUCOMTR-MCNC: 158 MG/DL (ref 70–99)
GLUCOSE BLDC GLUCOMTR-MCNC: 161 MG/DL (ref 70–99)
GLUCOSE BLDC GLUCOMTR-MCNC: 186 MG/DL (ref 70–99)
GLUCOSE BLDC GLUCOMTR-MCNC: 198 MG/DL (ref 70–99)
GLUCOSE BLDC GLUCOMTR-MCNC: 88 MG/DL (ref 70–99)
GLUCOSE BLDC GLUCOMTR-MCNC: 89 MG/DL (ref 70–99)
GLUCOSE SERPL-MCNC: 129 MG/DL (ref 70–99)
GLUCOSE SERPL-MCNC: 157 MG/DL (ref 70–99)
GLUCOSE UR STRIP-MCNC: NEGATIVE MG/DL
HCO3 BLDV-SCNC: 23 MMOL/L (ref 21–28)
HCO3 BLDV-SCNC: 23 MMOL/L (ref 21–28)
HCO3 BLDV-SCNC: 25 MMOL/L (ref 21–28)
HCO3 BLDV-SCNC: 25 MMOL/L (ref 21–28)
HCO3 BLDV-SCNC: 26 MMOL/L (ref 21–28)
HCT VFR BLD AUTO: 23.8 % (ref 40–53)
HCT VFR BLD AUTO: 24.9 % (ref 40–53)
HGB BLD-MCNC: 7.1 G/DL (ref 13.3–17.7)
HGB BLD-MCNC: 7.2 G/DL (ref 13.3–17.7)
HGB BLD-MCNC: 7.4 G/DL (ref 13.3–17.7)
HGB BLD-MCNC: 7.9 G/DL (ref 13.3–17.7)
HGB UR QL STRIP: ABNORMAL
HYALINE CASTS #/AREA URNS LPF: 3 /LPF (ref 0–2)
KETONES UR STRIP-MCNC: NEGATIVE MG/DL
LACTATE BLD-SCNC: 1.5 MMOL/L (ref 0.7–2.1)
LEUKOCYTE ESTERASE UR QL STRIP: ABNORMAL
LMWH PPP CHRO-ACNC: 0.14 IU/ML
LMWH PPP CHRO-ACNC: NORMAL IU/ML
MAGNESIUM SERPL-MCNC: 2.4 MG/DL (ref 1.6–2.3)
MAGNESIUM SERPL-MCNC: 2.4 MG/DL (ref 1.6–2.3)
MCH RBC QN AUTO: 30 PG (ref 26.5–33)
MCH RBC QN AUTO: 30 PG (ref 26.5–33)
MCHC RBC AUTO-ENTMCNC: 29.7 G/DL (ref 31.5–36.5)
MCHC RBC AUTO-ENTMCNC: 30.3 G/DL (ref 31.5–36.5)
MCV RBC AUTO: 101 FL (ref 78–100)
MCV RBC AUTO: 99 FL (ref 78–100)
MUCOUS THREADS #/AREA URNS LPF: PRESENT /LPF
NITRATE UR QL: NEGATIVE
O2/TOTAL GAS SETTING VFR VENT: 21 %
O2/TOTAL GAS SETTING VFR VENT: ABNORMAL %
O2/TOTAL GAS SETTING VFR VENT: ABNORMAL %
O2/TOTAL GAS SETTING VFR VENT: NORMAL %
O2/TOTAL GAS SETTING VFR VENT: NORMAL %
OXYHGB MFR BLDV: 44 %
OXYHGB MFR BLDV: 45 %
OXYHGB MFR BLDV: 52 %
OXYHGB MFR BLDV: 57 %
OXYHGB MFR BLDV: 57 %
PCO2 BLDV: 33 MM HG (ref 40–50)
PCO2 BLDV: 35 MM HG (ref 40–50)
PCO2 BLDV: 37 MM HG (ref 40–50)
PCO2 BLDV: 40 MM HG (ref 40–50)
PCO2 BLDV: 40 MM HG (ref 40–50)
PH BLDV: 7.41 PH (ref 7.32–7.43)
PH BLDV: 7.42 PH (ref 7.32–7.43)
PH BLDV: 7.43 PH (ref 7.32–7.43)
PH BLDV: 7.44 PH (ref 7.32–7.43)
PH BLDV: 7.45 PH (ref 7.32–7.43)
PH UR STRIP: 5 PH (ref 5–7)
PHOSPHATE SERPL-MCNC: 3 MG/DL (ref 2.5–4.5)
PLATELET # BLD AUTO: 128 10E9/L (ref 150–450)
PLATELET # BLD AUTO: 131 10E9/L (ref 150–450)
PO2 BLDV: 28 MM HG (ref 25–47)
PO2 BLDV: 28 MM HG (ref 25–47)
PO2 BLDV: 30 MM HG (ref 25–47)
PO2 BLDV: 32 MM HG (ref 25–47)
PO2 BLDV: 33 MM HG (ref 25–47)
POTASSIUM BLD-SCNC: 4.3 MMOL/L (ref 3.4–5.3)
POTASSIUM SERPL-SCNC: 4 MMOL/L (ref 3.4–5.3)
POTASSIUM SERPL-SCNC: 4 MMOL/L (ref 3.4–5.3)
PROT SERPL-MCNC: 6.7 G/DL (ref 6.8–8.8)
PSA SERPL-ACNC: 4.5 UG/L (ref 0–4)
RBC # BLD AUTO: 2.4 10E12/L (ref 4.4–5.9)
RBC # BLD AUTO: 2.47 10E12/L (ref 4.4–5.9)
RBC #/AREA URNS AUTO: 8 /HPF (ref 0–2)
SODIUM SERPL-SCNC: 138 MMOL/L (ref 133–144)
SODIUM SERPL-SCNC: 139 MMOL/L (ref 133–144)
SP GR UR STRIP: 1.01 (ref 1–1.03)
SQUAMOUS #/AREA URNS AUTO: <1 /HPF (ref 0–1)
URN SPEC COLLECT METH UR: ABNORMAL
UROBILINOGEN UR STRIP-MCNC: 2 MG/DL (ref 0–2)
WBC # BLD AUTO: 11.1 10E9/L (ref 4–11)
WBC # BLD AUTO: 9.3 10E9/L (ref 4–11)
WBC #/AREA URNS AUTO: 50 /HPF (ref 0–2)
WBC CLUMPS #/AREA URNS HPF: PRESENT /HPF

## 2017-04-15 PROCEDURE — 25000128 H RX IP 250 OP 636: Performed by: STUDENT IN AN ORGANIZED HEALTH CARE EDUCATION/TRAINING PROGRAM

## 2017-04-15 PROCEDURE — 20000004 ZZH R&B ICU UMMC

## 2017-04-15 PROCEDURE — 25000128 H RX IP 250 OP 636: Performed by: INTERNAL MEDICINE

## 2017-04-15 PROCEDURE — 87088 URINE BACTERIA CULTURE: CPT | Performed by: INTERNAL MEDICINE

## 2017-04-15 PROCEDURE — 25000132 ZZH RX MED GY IP 250 OP 250 PS 637: Performed by: INTERNAL MEDICINE

## 2017-04-15 PROCEDURE — 86644 CMV ANTIBODY: CPT | Performed by: INTERNAL MEDICINE

## 2017-04-15 PROCEDURE — 82805 BLOOD GASES W/O2 SATURATION: CPT | Performed by: INTERNAL MEDICINE

## 2017-04-15 PROCEDURE — 86706 HEP B SURFACE ANTIBODY: CPT | Performed by: INTERNAL MEDICINE

## 2017-04-15 PROCEDURE — S0171 BUMETANIDE 0.5 MG: HCPCS | Performed by: INTERNAL MEDICINE

## 2017-04-15 PROCEDURE — 87340 HEPATITIS B SURFACE AG IA: CPT | Performed by: INTERNAL MEDICINE

## 2017-04-15 PROCEDURE — 82330 ASSAY OF CALCIUM: CPT | Performed by: INTERNAL MEDICINE

## 2017-04-15 PROCEDURE — 80048 BASIC METABOLIC PNL TOTAL CA: CPT | Performed by: INTERNAL MEDICINE

## 2017-04-15 PROCEDURE — 87040 BLOOD CULTURE FOR BACTERIA: CPT | Performed by: INTERNAL MEDICINE

## 2017-04-15 PROCEDURE — G0103 PSA SCREENING: HCPCS | Performed by: INTERNAL MEDICINE

## 2017-04-15 PROCEDURE — 85027 COMPLETE CBC AUTOMATED: CPT | Performed by: INTERNAL MEDICINE

## 2017-04-15 PROCEDURE — 86695 HERPES SIMPLEX TYPE 1 TEST: CPT | Performed by: INTERNAL MEDICINE

## 2017-04-15 PROCEDURE — 00000146 ZZHCL STATISTIC GLUCOSE BY METER IP

## 2017-04-15 PROCEDURE — 40000275 ZZH STATISTIC RCP TIME EA 10 MIN

## 2017-04-15 PROCEDURE — 40000076 ZZH STATISTIC IABP MONITORING

## 2017-04-15 PROCEDURE — 82533 TOTAL CORTISOL: CPT | Performed by: INTERNAL MEDICINE

## 2017-04-15 PROCEDURE — 25000125 ZZHC RX 250: Performed by: INTERNAL MEDICINE

## 2017-04-15 PROCEDURE — 99291 CRITICAL CARE FIRST HOUR: CPT | Mod: GC | Performed by: INTERNAL MEDICINE

## 2017-04-15 PROCEDURE — 40000048 ZZH STATISTIC DAILY SWAN MONITORING

## 2017-04-15 PROCEDURE — 40000866 ZZHCL STATISTIC HIV 1/2 ANTIGEN/ANTIBODY PRETRANSPLANT ONLY: Performed by: INTERNAL MEDICINE

## 2017-04-15 PROCEDURE — 86696 HERPES SIMPLEX TYPE 2 TEST: CPT | Performed by: INTERNAL MEDICINE

## 2017-04-15 PROCEDURE — 81001 URINALYSIS AUTO W/SCOPE: CPT | Performed by: INTERNAL MEDICINE

## 2017-04-15 PROCEDURE — 40000986 XR CHEST PORT 1 VW

## 2017-04-15 PROCEDURE — 40000196 ZZH STATISTIC RAPCV CVP MONITORING

## 2017-04-15 PROCEDURE — 85018 HEMOGLOBIN: CPT | Performed by: INTERNAL MEDICINE

## 2017-04-15 PROCEDURE — 92526 ORAL FUNCTION THERAPY: CPT | Mod: GN | Performed by: SPEECH-LANGUAGE PATHOLOGIST

## 2017-04-15 PROCEDURE — 25000125 ZZHC RX 250

## 2017-04-15 PROCEDURE — 27210136 ZZH KIT CATH ARTERIAL EXT SUPPLY

## 2017-04-15 PROCEDURE — 83605 ASSAY OF LACTIC ACID: CPT | Performed by: INTERNAL MEDICINE

## 2017-04-15 PROCEDURE — 86704 HEP B CORE ANTIBODY TOTAL: CPT | Performed by: INTERNAL MEDICINE

## 2017-04-15 PROCEDURE — 25000132 ZZH RX MED GY IP 250 OP 250 PS 637

## 2017-04-15 PROCEDURE — 40000225 ZZH STATISTIC SLP WARD VISIT: Performed by: SPEECH-LANGUAGE PATHOLOGIST

## 2017-04-15 PROCEDURE — 87186 SC STD MICRODIL/AGAR DIL: CPT | Performed by: INTERNAL MEDICINE

## 2017-04-15 PROCEDURE — 87086 URINE CULTURE/COLONY COUNT: CPT | Performed by: INTERNAL MEDICINE

## 2017-04-15 PROCEDURE — 36415 COLL VENOUS BLD VENIPUNCTURE: CPT | Performed by: INTERNAL MEDICINE

## 2017-04-15 PROCEDURE — 86787 VARICELLA-ZOSTER ANTIBODY: CPT | Performed by: INTERNAL MEDICINE

## 2017-04-15 PROCEDURE — 85520 HEPARIN ASSAY: CPT | Performed by: INTERNAL MEDICINE

## 2017-04-15 PROCEDURE — 86665 EPSTEIN-BARR CAPSID VCA: CPT | Performed by: INTERNAL MEDICINE

## 2017-04-15 PROCEDURE — 84100 ASSAY OF PHOSPHORUS: CPT | Performed by: INTERNAL MEDICINE

## 2017-04-15 PROCEDURE — 25000132 ZZH RX MED GY IP 250 OP 250 PS 637: Performed by: STUDENT IN AN ORGANIZED HEALTH CARE EDUCATION/TRAINING PROGRAM

## 2017-04-15 PROCEDURE — 40000014 ZZH STATISTIC ARTERIAL MONITORING DAILY

## 2017-04-15 PROCEDURE — 27210432 ZZH NUTRITION PRODUCT RENAL BASIC LITER

## 2017-04-15 PROCEDURE — 86803 HEPATITIS C AB TEST: CPT | Performed by: INTERNAL MEDICINE

## 2017-04-15 PROCEDURE — 85018 HEMOGLOBIN: CPT | Performed by: PEDIATRICS

## 2017-04-15 PROCEDURE — 80076 HEPATIC FUNCTION PANEL: CPT | Performed by: INTERNAL MEDICINE

## 2017-04-15 PROCEDURE — 83735 ASSAY OF MAGNESIUM: CPT | Performed by: INTERNAL MEDICINE

## 2017-04-15 RX ORDER — PIPERACILLIN SODIUM, TAZOBACTAM SODIUM 4; .5 G/20ML; G/20ML
4.5 INJECTION, POWDER, LYOPHILIZED, FOR SOLUTION INTRAVENOUS EVERY 6 HOURS
Status: DISCONTINUED | OUTPATIENT
Start: 2017-04-15 | End: 2017-04-16

## 2017-04-15 RX ORDER — HEPARIN SODIUM 10000 [USP'U]/100ML
0-3500 INJECTION, SOLUTION INTRAVENOUS CONTINUOUS
Status: DISCONTINUED | OUTPATIENT
Start: 2017-04-15 | End: 2017-04-16

## 2017-04-15 RX ORDER — MILRINONE LACTATE 0.2 MG/ML
0.12 INJECTION, SOLUTION INTRAVENOUS CONTINUOUS
Status: DISCONTINUED | OUTPATIENT
Start: 2017-04-15 | End: 2017-04-26

## 2017-04-15 RX ORDER — HYDRALAZINE HYDROCHLORIDE 50 MG/1
50 TABLET, FILM COATED ORAL EVERY 6 HOURS
Status: DISCONTINUED | OUTPATIENT
Start: 2017-04-15 | End: 2017-04-20

## 2017-04-15 RX ORDER — BUMETANIDE 0.25 MG/ML
2 INJECTION INTRAMUSCULAR; INTRAVENOUS ONCE
Status: COMPLETED | OUTPATIENT
Start: 2017-04-15 | End: 2017-04-15

## 2017-04-15 RX ORDER — CLOPIDOGREL BISULFATE 75 MG/1
75 TABLET ORAL DAILY
Status: COMPLETED | OUTPATIENT
Start: 2017-04-16 | End: 2017-04-16

## 2017-04-15 RX ADMIN — HYDRALAZINE HYDROCHLORIDE 50 MG: 50 TABLET ORAL at 17:09

## 2017-04-15 RX ADMIN — ASPIRIN 81 MG CHEWABLE TABLET 81 MG: 81 TABLET CHEWABLE at 07:56

## 2017-04-15 RX ADMIN — MILRINONE LACTATE 0.12 MCG/KG/MIN: 200 INJECTION, SOLUTION INTRAVENOUS at 10:48

## 2017-04-15 RX ADMIN — PANTOPRAZOLE SODIUM 40 MG: 40 INJECTION, POWDER, FOR SOLUTION INTRAVENOUS at 07:56

## 2017-04-15 RX ADMIN — PANTOPRAZOLE SODIUM 40 MG: 40 INJECTION, POWDER, FOR SOLUTION INTRAVENOUS at 19:43

## 2017-04-15 RX ADMIN — POTASSIUM CHLORIDE 20 MEQ: 1.5 POWDER, FOR SOLUTION ORAL at 06:35

## 2017-04-15 RX ADMIN — QUETIAPINE FUMARATE 25 MG: 25 TABLET, FILM COATED ORAL at 22:09

## 2017-04-15 RX ADMIN — HYDRALAZINE HYDROCHLORIDE 50 MG: 50 TABLET ORAL at 22:09

## 2017-04-15 RX ADMIN — HEPARIN SODIUM 900 UNITS/HR: 10000 INJECTION, SOLUTION INTRAVENOUS at 18:45

## 2017-04-15 RX ADMIN — HYDRALAZINE HYDROCHLORIDE 50 MG: 50 TABLET ORAL at 11:09

## 2017-04-15 RX ADMIN — BUMETANIDE 2 MG: 0.25 INJECTION INTRAMUSCULAR; INTRAVENOUS at 18:20

## 2017-04-15 RX ADMIN — OXYCODONE HYDROCHLORIDE 10 MG: 5 TABLET ORAL at 23:23

## 2017-04-15 RX ADMIN — HUMAN INSULIN 5 UNITS/HR: 100 INJECTION, SOLUTION SUBCUTANEOUS at 07:56

## 2017-04-15 RX ADMIN — SENNOSIDES AND DOCUSATE SODIUM 1 TABLET: 8.6; 5 TABLET ORAL at 10:48

## 2017-04-15 RX ADMIN — HYDRALAZINE HYDROCHLORIDE 50 MG: 50 TABLET ORAL at 06:35

## 2017-04-15 RX ADMIN — HEPARIN SODIUM 750 UNITS/HR: 10000 INJECTION, SOLUTION INTRAVENOUS at 10:48

## 2017-04-15 RX ADMIN — HUMAN INSULIN 3 UNITS/HR: 100 INJECTION, SOLUTION SUBCUTANEOUS at 14:18

## 2017-04-15 RX ADMIN — OXYCODONE HYDROCHLORIDE 5 MG: 5 TABLET ORAL at 11:07

## 2017-04-15 RX ADMIN — OXYCODONE HYDROCHLORIDE 5 MG: 5 TABLET ORAL at 15:34

## 2017-04-15 RX ADMIN — DOBUTAMINE 2.5 MCG/KG/MIN: 12.5 INJECTION, SOLUTION, CONCENTRATE INTRAVENOUS at 15:24

## 2017-04-15 RX ADMIN — PIPERACILLIN SODIUM,TAZOBACTAM SODIUM 4.5 G: 4; .5 INJECTION, POWDER, FOR SOLUTION INTRAVENOUS at 13:08

## 2017-04-15 RX ADMIN — PIPERACILLIN SODIUM,TAZOBACTAM SODIUM 4.5 G: 4; .5 INJECTION, POWDER, FOR SOLUTION INTRAVENOUS at 19:43

## 2017-04-15 RX ADMIN — MULTIVIT AND MINERALS-FERROUS GLUCONATE 9 MG IRON/15 ML ORAL LIQUID 15 ML: at 07:56

## 2017-04-15 RX ADMIN — CLOPIDOGREL 75 MG: 75 TABLET, FILM COATED ORAL at 07:56

## 2017-04-15 RX ADMIN — PIPERACILLIN SODIUM,TAZOBACTAM SODIUM 4.5 G: 4; .5 INJECTION, POWDER, FOR SOLUTION INTRAVENOUS at 07:56

## 2017-04-15 RX ADMIN — PIPERACILLIN SODIUM,TAZOBACTAM SODIUM 4.5 G: 4; .5 INJECTION, POWDER, FOR SOLUTION INTRAVENOUS at 02:13

## 2017-04-15 RX ADMIN — VANCOMYCIN HYDROCHLORIDE 1500 MG: 10 INJECTION, POWDER, LYOPHILIZED, FOR SOLUTION INTRAVENOUS at 02:00

## 2017-04-15 ASSESSMENT — PAIN DESCRIPTION - DESCRIPTORS
DESCRIPTORS: ACHING
DESCRIPTORS: ACHING

## 2017-04-15 NOTE — PHARMACY-VANCOMYCIN DOSING SERVICE
Pharmacy Vancomycin Initial Note  Date of Service April 15, 2017  Patient's  1965  51 year old, male    Indication: Sepsis    Current estimated CrCl = Estimated Creatinine Clearance: 53.3 mL/min (based on Cr of 1.39).    Creatinine for last 3 days  2017:  3:52 AM Creatinine 1.50 mg/dL; 11:35 AM Creatinine 1.48 mg/dL;  8:28 PM Creatinine 1.48 mg/dL  2017:  3:06 AM Creatinine 1.42 mg/dL;  4:15 PM Creatinine 1.34 mg/dL; 10:05 PM Creatinine 1.39 mg/dL  2017:  4:07 AM Creatinine 1.50 mg/dL;  5:51 PM Creatinine 1.39 mg/dL    Recent Vancomycin Level(s) for last 3 days  No results found for requested labs within last 72 hours.      Vancomycin IV Administrations (past 72 hours)      No vancomycin orders with administrations in past 72 hours.                Nephrotoxins and other renal medications (Future)    Start     Dose/Rate Route Frequency Ordered Stop    17 0230  vancomycin (VANCOCIN) 1,250 mg in NaCl 0.9 % 250 mL intermittent infusion      1,250 mg Intravenous EVERY 24 HOURS 04/15/17 0116      04/15/17 0230  vancomycin (VANCOCIN) 1,500 mg in NaCl 0.9 % 250 mL intermittent infusion      1,500 mg Intravenous ONCE 04/15/17 0115      04/15/17 0130  piperacillin-tazobactam (ZOSYN) 4.5 g vial to attach to  mL bag      4.5 g  over 1 Hours Intravenous EVERY 6 HOURS 04/15/17 0055      17 1445  DOBUTamine (DOBUTREX) 1,000 mg in D5W 250 mL infusion (adult max conc)      5 mcg/kg/min × 62 kg (Dosing Weight)  4.7 mL/hr  Intravenous CONTINUOUS 17 1441            Contrast Orders - past 72 hours     None                Plan:  1.  Start vancomycin  1500 mg IV x 1 then, 1250 mg IV q24h.   2.  Goal Trough Level: 15-20 mg/L   3.  Pharmacy will check trough levels as appropriate in 1-3 Days.    4. Serum creatinine levels will be ordered daily for the first week of therapy and at least twice weekly for subsequent weeks.    5. [place] method utilized to dose vancomycin therapy: Method  2    Flavia Day, pharmD, BCPS

## 2017-04-15 NOTE — PROGRESS NOTES
Nutrition progress note:     Per discussion w/ RN, MD wanted to reduce rate of current TF formula d/t starting on oral diet. Order changed from 45 ml/hr to 30 ml/hr.     RD to monitor oral intake and ability to further reduce TF volume.     Adama Mcdowell MS, RD, LD, CNSC

## 2017-04-15 NOTE — PROCEDURES
Pre-Procedure Diagnosis:Cardiogenic shock  Post-Procedure Diagnosis:cardiogenic shock  Indication: Blood pressure monitoring on IABP    Type of Procedure: Arterial Catheter Placement  Attending Physician: Vikram Jean-Baptiste  Supervising physician: Gallo Sheffield           Consent: Detailed explanation of the procedure, treatment options, risks including but not limited to infection and bleeding, and benefits were explained to the patient.  A written informed consent was obtained.     Technique: A time out was preformed identifying the correct procedure, the correct location with the nursing staff.  Blood flow via ulnar and radial artery confirmed by US/doppler. The eft wrist was prepped with 2% chlorhexidine and draped with a sterile sheet in the usual fashion.  1% lidocaine was administered subcutaneously for local anesthesia. The radial artery was cannulated with a Arrow arterial line catheter. The catheter was sutured in place and a sterile dressing was applied over the site prior to removal of drapes.   The patient tolerated the procedure well and there were no complications.        EBL:3cc  Complication: None    Viki Palafox MD  Internal Medicine PGY2

## 2017-04-15 NOTE — PLAN OF CARE
Problem: Goal Outcome Summary  Goal: Goal Outcome Summary  SLP: Pt seen for dysphagia treatment for diet advancement. Pt repositioned 30 degrees upright by RN. Unable to sit 90 degrees due to balloon pump in place. Pt tolerating trials of pudding, semisolid, and solid textures as well as thin liquids with no overt clinical s/sx of aspiration/penetration demonstrated this date. Recommend pt advance as tolerated to a regular consistency diet with thin liquids. Straws OK. Pt should be seated as upright as allowed for all PO intake. Encourage small bites/sips, slow rate, and alternating consistencies. SLP to follow per POC.

## 2017-04-15 NOTE — PROGRESS NOTES
"[facility]  Cardiology II Service / Advanced Heart Failure  Daily Note 4/15/2017        Interval History:   - Overnight IABP arterial pressure measurements were erratic. Finally, no measurements could be obtained and Rt radial A line had to be placed. IABP works fine otherwise with good BP augmentation  -SAMEERA done yesterday      Pertinent Medications:  I have reviewed this patient's current medications      piperacillin-tazobactam  4.5 g Intravenous Q6H     [START ON 4/16/2017] vancomycin (VANCOCIN) IV  1,250 mg Intravenous Q24H     hydrALAZINE  50 mg Oral Q6H     [START ON 4/16/2017] clopidogrel  75 mg Oral Daily     QUEtiapine  25 mg Oral At Bedtime     pantoprazole  40 mg Intravenous BID     pneumococcal vaccine  0.5 mL Intramuscular Once     sodium chloride (PF)  3 mL Intracatheter Q8H     multivitamins with minerals  15 mL Per Feeding Tube Daily     aspirin  81 mg Oral or Feeding Tube Daily         Examination:  /78  Temp 100.1  F (37.8  C) (Oral)  Resp 16  Ht 1.575 m (5' 2.01\")  Wt 58.4 kg (128 lb 12 oz)  SpO2 95%  BMI 23.54 kg/m2  GEN: A, cooperative and conversational   NECK: can not assess JVP   RESP: crackles   CV: S1S2S3, holossystolic murmur at the apex  ABD: Soft, nontender to palpation, no HSM, +BS, no guarding  EXT: No peripheral edema, warm/well perfused   NEURO: CN II-XII intact, normal 5/5 strength in all extremitites  SKIN: Normal skin turgor, no rash on limited exam    Data:  CMP  Recent Labs  Lab 04/15/17  0350 04/14/17  2345 04/14/17  1751 04/14/17  0407 04/13/17  2205  04/13/17  0958 04/13/17  0306  04/12/17  0352     --  139 142 140  < >  --  141  < > 141   POTASSIUM 4.0 4.3 3.7 4.0 4.5  < > 3.8 4.0  < > 3.7   CHLORIDE 105  --  105 108 108  < >  --  106  < > 102   CO2 24  --  24 24 22  < >  --  26  < > 31   ANIONGAP 11  --  9 10 10  < >  --  9  < > 7   *  --  178* 132* 118*  < >  --  118*  < > 110*   BUN 39*  --  35* 36* 32*  < >  " --  35*  < > 46*   CR 1.42*  --  1.39* 1.50* 1.39*  < >  --  1.42*  < > 1.50*   GFRESTIMATED 53*  --  54* 50* 54*  < >  --  53*  < > 50*   GFRESTBLACK 64  --  66 60* 66  < >  --  64  < > 60*   ROB 7.1*  --  7.4* 7.2* 7.4*  < >  --  7.5*  < > 7.8*   MAG 2.4*  --  2.2 2.3 2.2  < > 2.3 2.2  < > 2.6*   PHOS 3.0  --   --   --  2.8  --  3.6  --   --  3.8   PROTTOTAL 6.7*  --   --  6.7*  --   --   --  6.9  --  6.4*   ALBUMIN 2.3*  --   --  2.3*  --   --   --  2.5*  --  2.4*   BILITOTAL 0.8  --   --  0.7  --   --   --  1.1  --  1.0   ALKPHOS 71  --   --  62  --   --   --  64  --  69   AST 27  --   --  29  --   --   --  30  --  30   ALT 47  --   --  57  --   --   --  72*  --  83*   < > = values in this interval not displayed.  CBC    Recent Labs  Lab 04/15/17  0350 04/14/17  2345 04/14/17  1751 04/14/17  0407 04/13/17  2205  04/13/17  0306   WBC 11.1*  --   --  11.1* 11.2*  --  11.7*   RBC 2.47*  --   --  2.60* 2.54*  --  2.75*   HGB 7.4* 7.9* 7.9* 7.8* 7.6*  < > 8.4*   HCT 24.9*  --   --  26.3* 25.5*  --  27.2*   *  --   --  101* 100  --  99   MCH 30.0  --   --  30.0 29.9  --  30.5   MCHC 29.7*  --   --  29.7* 29.8*  --  30.9*   RDW 17.7*  --   --  18.1* 18.0*  --  18.2*   *  --   --  127* 130*  --  121*   < > = values in this interval not displayed.  INR    Recent Labs  Lab 04/12/17  0352 04/11/17  0254 04/10/17  0301 04/09/17  0317   INR 1.12 1.07 1.12 1.19*     Arterial Blood Gas    Recent Labs  Lab 04/15/17  0810 04/15/17  0350 04/14/17  2345 04/14/17  2100   O2PER 2L 21.0 21.0 2L       Imaging Studies:  Echo 3/27  The visual ejection fraction is estimated at 30-35%.  There is extensive inferior, inferoseptal, and inferolateral wall akinesis.  Basal/mid lateral wall hypokinesis  There is moderate to severe septal hypokinesis.  Septal wall motion abnormality may reflect pacemaker activation.  There is a catheter/pacemaker lead seen in the right ventricle.  The right ventricle is mildly dilated.  Severely  decreased right ventricular systolic function  There is no pericardial effusion.  The rhythm was paced.  Compared to the prior study, the LVEF appears somewhat decreased. Septal wall  motion abnormality larger- may reflect, in part, that patient in sinus tach on  prior study, and ventricular paced now. No hemodynamically significant  valvular abnormalities on 2D or color flow imaging     CT chest/abdomen 3/27   IMPRESSION:   1. Small mediastinal hemorrhage, small bilateral pleural effusions  which may be hemorrhagic. Small intraperitoneal hemorrhage. Minimal  pericardial hemorrhage.  2. Minimal pneumoperitoneum in the left paracolic gutter, of unknown  significance. No pneumatosis as clinically questioned.  3. IABP slightly low in position.  4. Bilateral pulmonary dependent consolidations represent compression  atelectasis, however differentials include aspiration.     Cath 3/26 [facility]  SUMMARY:   --Inferior STEMI w/ late presentation. Culprit dictated by complete  heart block, and cardiogenic shock. Emergent echo in the Cath Lab also  shows significant RV dysfunction.  --Three vessel coronary artery disease with diffuse coronary disease  out to the distal vessels  --Successful POBA of the prox and mid-RCA. Stent was not placed, in  anticipation he may need to be considered for bypass surgery in the  near future  --Insertion of a temporary pacemaker for bradycardia (AVB) and  hypotension  --Insertion of a IABP  --Upper GI bleed consistent with [place]lin-[place]     [facility] 3/27     CT head 3/28: normal      Echo 3/29  Interpretation Summary  Limited study. Ischemic CM. Moderately (EF 30-35%) reduced left ventricular  function is present. Traced at 32%.  Posterior wall akinesis is present. Lateral wall akinesis is present. Inferior  wall akinesis is present.  Right ventricular function, chamber size, wall motion, and thickness are  normal.  Dilation of the inferior vena cava is present with abnormal  respiratory  variation in diameter.     Compared to prior study, RV fxn is improved.     Echo 3/31  Left Ventricle  The Ejection Fraction is estimated at 50-55%. Inferior,posterior,lateral,basal  septal wall akinesis as reported before. No change.     CORONARY ANGIOGRAM 4/1:   1. Both coronary arteries arise from their respective cusps.  2. Right dominant.  3. LM has mild luminal irregularities.   4. LAD supplies the apex along with the RCA (type 2 LAD) and gives rise to septal perforators and a large and branching D1. There are widely patent stents extending from the proximal to mid LAD. The dLAD has mild to moderate disease to 30% stenosis. The D1 is jailed by the LAD stents, but has EBER 3 flow with disease to 30% stenosis.   5. The small ramus is no longer present.  6. LCX is occluded at the ostium.   7. RCA gives rise to PL branches and supplies PDA. There are widely patent stents extending from the proximal to mid RCA. The remainder of the mRCA has disease to 50% stenosis and the dRCA has disease to 10% stenosis. The RPDA has a widely patent stent and the remainder of the artery has mild disease to 10% stenosis. The RPLA has small vessels with diffuse disease including a distal branch occlusion. The RPLA supplies some small collateral branches to the dLCx.      COMPLICATIONS:  1. None      SUMMARY:   1. Cardiogenic shock following an inferior STEMI.  2. The LCx re-occlusion is not surprising as the recently revascularized LCx  had poor outflow and the revascularized portion did not supply a large territory (limited to no flow was present distal to the stented segment immediately following stenting). Repeat revascularization of the LCx would result in the same outcome of repeat closure and also risk embolizing thrombus from the LCx into the LAD so no treatment of the occluded LCx was performed.   3. Three vessel coronary artery disease with patent LAD and RCA stents and an occluded LCx.     Echo 4/3  Left  ventricular size is normal.  The Ejection Fraction is estimated at 35-40%.  Inferior wall akinesis is present.  Lateral wall akinesis is present.  Posterior wall akinesis is present.  Right ventricular function, chamber size, wall motion, and thickness are  normal.  Moderate mitral insufficiency is present.  Moderate tricuspid insufficiency is present.  Right ventricular systolic pressure is 47mmHg above the right atrial pressure.  The inferior vena cava is normal.     Echo 4/5  Moderately (EF 35-40%) reduced left ventricular function is present. Traced at  40%.  Moderate mitral insufficiency is present.  The cause of the mitral insufficiency appears to be restricted posterior  leaflet from posterior MI. No mitral leaflet pathology seen.  Dilation of the inferior vena cava is present with abnormal respiratory  variation in diameter.     CT abd without contrast 4/4  IMPRESSION:   1. No evidence of ischemic bowel, obstruction, or intra-abdominal  infection.  2. Bibasilar patchy pulmonary opacities likely represent combination  of aspiration and atelectasis.  3. Small amount of simple free fluid within the lower abdomen.  4. Small left retroperitoneal hematoma along the iliac vasculature  likely postprocedural.   5. Unchanged size of bilateral pleural effusions, which now consist of  simple fluid.    Assessment and Plan  51 year old man presented with cardiogenic shock from inferior STEMI s/p RCA aspiration thrombectomy and POBA on 3/26 at [facility] s/p IABP and initiation of Dopamine, also during procedure, CHB s/p temp pacer, emesis/hematmesis and altered mental status s/p intubation transferred here for consideration of mechanical support on 3/27. Had PCI to RCA, LAD and LCx (on ASA and plavix) started on epinephrine in addition to dopamine, post procedure developed distributive shock picture from infection (aspiration pneumonia? Sputum cx growing staph aureus, on vanco and zosyn) vs post-MI SIRS response. Currently  in cardiogenic shock with IABP in situ    Card  # Cardiogenic shock 4/3- from underlying 3v CAD-has had high SVR and low CI, complicated by ischemic MR  # Due to inferior wall STEMI. S/P 7 stents to LAD, circ, RCA (angiogram report pending). Now with IABP, temporary pacemaker after 3rd deg heart block in cath lab at [facility] on 3/26   # Small pericardial hemorrhage   - IABP in place  - Hydral 50mg q6H, once his SCr improves will consider starting Nipride for a more stable afterload reduction regimen, and give him an IABP wean trial  - Hold off on isordil for now  - LVAD work up given SCr improving  - Cortisol normal  - Given tachycardia, will try to wean him off of dobutamine. Therefore, will start milrinone. Plan to start milrinone at 0.125. HDs 2 hrs later and adjust. Goal is to uptitrate milrinone slowly while weaning him off of dobutamine  - Plan to keep CVP 9-12 today, net 500 cc to 1 L negative  - SAMEERA yesterday (prelim report) without severe MR.  - Discuss with CVTS re subclavian IABP placement in the future    # Neuro  - alert, follows commands  - WBC stable 11.1  - Seroquel 25 mg         Respiratory  #Intubated for airway protection due to mental status and emesis on 3/26, extubated 4/3  #Probable Aspiration PNA/pneumonitis vs MSSA pneumonia         # ID  - UA yesterday with cloudy urine, 50 WBCs, many bacteria, no nitrates. He is already on Vanc and Zosyn  - fever on 4/3-type 2 MI vs infection (unclear source)  - Sepsis from aspiration pneumonia/pneumonitis vs MSSA pneumonia vs post-MI SIRS response initially on 3/28  - repeat cultures if febrile  - He was on Unasyn. Switched to vanco and zosyn (4/3 - 4/7) for fever and elevated lactate concern for sepsis  - Currently off antibiotics         # Endocrine  T2DM: HA1C 7.5 on admission.  - Insulin gtt per nurising protocol with hypoglycemia precautions.       # GI  - GI saw the patient. Recommend: start heparin 4/15 without bolus. If recurrent bleeding, will  do flexible sigmoidoscopy  - Had several maroon BMs on 4/11 and 4/12, Hb overall dropped by a gram or less, heparin inf held and GI consulted  - Will monitor Hb  - Consider scope if hemodynamically unstable, or if his Hb continues to drop  - Protonix 40 IV BID     Minimal pneumoperitoneum, unclear etiology: Seen by General surgery.  - had bloody NG output earlier in hospitalization.  - Conservative Mx for now        # Renal/  Renal function slightly improved- from hypoperfusion from cardiogenic shock 4/5 plus central venous congestion plus contrast nephropathy  -Diuresis  - Daily Cr  - Avoid nephrotoxins as able  - Plan to make 500cc to 1L net negative today     -Hypernatremia  - Resolved. Stopped his D5W, only getting FWF's.    #Heme  -Dropping Hgb. Will keep an eye. No obvious blood in the stool past few days        FEN: feeding tube  Code: FULL  PPx: PPI 40mg daily, senna PRN  Dispo: pending stability        Plan of care discussed with Dr. Estiven Farah MD  Cardiology Fellow  [phone]    I have reviewed today's vital signs, notes, medications, labs and imaging.  I have also seen and examined the patient and agree with the findings and plan as outlined above.  T 100.5 with  and /38 with CVP 11-14 and CI 2.2 with 2.7L UO on Dbx 5 mcg/kg/min and IABP 1:1.  Lungs clear and tachy S1 and S2 with loud S3 and harsh IV/VI HSM.  Labs with Cr 1.42 and WBC 11.1 with cortisol 27.  Assessment: Pt with cardiogenic shock on dual therapy.  Plan for traqnsition from Dbx to milrinone to determine if more supportive of cardiac fn.  Will begin low intensity heparin and follow for bleeding.  Total critical care time 40 min.     Vikram Jean-Baptiste MD, PhD  Professor, Heart Failure and Cardiac Transplantation  [facility]

## 2017-04-15 NOTE — PLAN OF CARE
Problem: Goal Outcome Summary  Goal: Goal Outcome Summary  T-max 100.5, tachycardic to the 130s. Cards notified. Blood and urine culture in progress. Vanco/zosyn started. Blood noted in IABP tubing at 0445. Cards 2 notified. Transducer clotted off. Right radial a-line placed to monitor BP. Will conitinue to document pressure based upon a-line.      Plan: Possible subclavian IABP today.

## 2017-04-16 ENCOUNTER — APPOINTMENT (OUTPATIENT)
Dept: GENERAL RADIOLOGY | Facility: CLINIC | Age: 52
DRG: 228 | End: 2017-04-16
Attending: INTERNAL MEDICINE
Payer: COMMERCIAL

## 2017-04-16 ENCOUNTER — APPOINTMENT (OUTPATIENT)
Dept: OCCUPATIONAL THERAPY | Facility: CLINIC | Age: 52
DRG: 228 | End: 2017-04-16
Attending: INTERNAL MEDICINE
Payer: COMMERCIAL

## 2017-04-16 ENCOUNTER — APPOINTMENT (OUTPATIENT)
Dept: SPEECH THERAPY | Facility: CLINIC | Age: 52
DRG: 228 | End: 2017-04-16
Attending: INTERNAL MEDICINE
Payer: COMMERCIAL

## 2017-04-16 LAB
ABO + RH BLD: NORMAL
ABO + RH BLD: NORMAL
ALBUMIN SERPL-MCNC: 2.2 G/DL (ref 3.4–5)
ALP SERPL-CCNC: 62 U/L (ref 40–150)
ALT SERPL W P-5'-P-CCNC: 40 U/L (ref 0–70)
ANION GAP SERPL CALCULATED.3IONS-SCNC: 8 MMOL/L (ref 3–14)
ANION GAP SERPL CALCULATED.3IONS-SCNC: 9 MMOL/L (ref 3–14)
AST SERPL W P-5'-P-CCNC: 21 U/L (ref 0–45)
BACTERIA SPEC CULT: ABNORMAL
BASE DEFICIT BLDV-SCNC: NORMAL MMOL/L
BASE EXCESS BLDV CALC-SCNC: 2.9 MMOL/L
BASE EXCESS BLDV CALC-SCNC: 3 MMOL/L
BASE EXCESS BLDV CALC-SCNC: 3.4 MMOL/L
BASE EXCESS BLDV CALC-SCNC: 3.4 MMOL/L
BASE EXCESS BLDV CALC-SCNC: 3.5 MMOL/L
BASE EXCESS BLDV CALC-SCNC: NORMAL MMOL/L
BILIRUB DIRECT SERPL-MCNC: 0.4 MG/DL (ref 0–0.2)
BILIRUB SERPL-MCNC: 0.9 MG/DL (ref 0.2–1.3)
BLD GP AB SCN SERPL QL: NORMAL
BLD PROD TYP BPU: NORMAL
BLD PROD TYP BPU: NORMAL
BLD UNIT ID BPU: 0
BLOOD BANK CMNT PATIENT-IMP: NORMAL
BLOOD PRODUCT CODE: NORMAL
BPU ID: NORMAL
BUN SERPL-MCNC: 28 MG/DL (ref 7–30)
BUN SERPL-MCNC: 32 MG/DL (ref 7–30)
CA-I BLD-MCNC: 4.2 MG/DL (ref 4.4–5.2)
CALCIUM SERPL-MCNC: 6.9 MG/DL (ref 8.5–10.1)
CALCIUM SERPL-MCNC: 7.1 MG/DL (ref 8.5–10.1)
CHLORIDE SERPL-SCNC: 106 MMOL/L (ref 94–109)
CHLORIDE SERPL-SCNC: 106 MMOL/L (ref 94–109)
CO2 SERPL-SCNC: 25 MMOL/L (ref 20–32)
CO2 SERPL-SCNC: 25 MMOL/L (ref 20–32)
CREAT SERPL-MCNC: 1.3 MG/DL (ref 0.66–1.25)
CREAT SERPL-MCNC: 1.44 MG/DL (ref 0.66–1.25)
ERYTHROCYTE [DISTWIDTH] IN BLOOD BY AUTOMATED COUNT: 17.5 % (ref 10–15)
GFR SERPL CREATININE-BSD FRML MDRD: 52 ML/MIN/1.7M2
GFR SERPL CREATININE-BSD FRML MDRD: 59 ML/MIN/1.7M2
GLUCOSE BLDC GLUCOMTR-MCNC: 101 MG/DL (ref 70–99)
GLUCOSE BLDC GLUCOMTR-MCNC: 105 MG/DL (ref 70–99)
GLUCOSE BLDC GLUCOMTR-MCNC: 106 MG/DL (ref 70–99)
GLUCOSE BLDC GLUCOMTR-MCNC: 108 MG/DL (ref 70–99)
GLUCOSE BLDC GLUCOMTR-MCNC: 109 MG/DL (ref 70–99)
GLUCOSE BLDC GLUCOMTR-MCNC: 116 MG/DL (ref 70–99)
GLUCOSE BLDC GLUCOMTR-MCNC: 118 MG/DL (ref 70–99)
GLUCOSE BLDC GLUCOMTR-MCNC: 127 MG/DL (ref 70–99)
GLUCOSE BLDC GLUCOMTR-MCNC: 131 MG/DL (ref 70–99)
GLUCOSE BLDC GLUCOMTR-MCNC: 141 MG/DL (ref 70–99)
GLUCOSE BLDC GLUCOMTR-MCNC: 144 MG/DL (ref 70–99)
GLUCOSE BLDC GLUCOMTR-MCNC: 146 MG/DL (ref 70–99)
GLUCOSE BLDC GLUCOMTR-MCNC: 155 MG/DL (ref 70–99)
GLUCOSE BLDC GLUCOMTR-MCNC: 155 MG/DL (ref 70–99)
GLUCOSE BLDC GLUCOMTR-MCNC: 208 MG/DL (ref 70–99)
GLUCOSE BLDC GLUCOMTR-MCNC: 83 MG/DL (ref 70–99)
GLUCOSE BLDC GLUCOMTR-MCNC: 92 MG/DL (ref 70–99)
GLUCOSE SERPL-MCNC: 109 MG/DL (ref 70–99)
GLUCOSE SERPL-MCNC: 132 MG/DL (ref 70–99)
HCO3 BLDV-SCNC: 27 MMOL/L (ref 21–28)
HCO3 BLDV-SCNC: 28 MMOL/L (ref 21–28)
HCO3 BLDV-SCNC: NORMAL MMOL/L (ref 21–28)
HCT VFR BLD AUTO: 27.4 % (ref 40–53)
HGB BLD-MCNC: 6.9 G/DL (ref 13.3–17.7)
HGB BLD-MCNC: 8.2 G/DL (ref 13.3–17.7)
HGB BLD-MCNC: 8.3 G/DL (ref 13.3–17.7)
HGB BLD-MCNC: 8.4 G/DL (ref 13.3–17.7)
LMWH PPP CHRO-ACNC: 0.18 IU/ML
MAGNESIUM SERPL-MCNC: 1.8 MG/DL (ref 1.6–2.3)
MAGNESIUM SERPL-MCNC: 2.3 MG/DL (ref 1.6–2.3)
MCH RBC QN AUTO: 29.7 PG (ref 26.5–33)
MCHC RBC AUTO-ENTMCNC: 30.7 G/DL (ref 31.5–36.5)
MCV RBC AUTO: 97 FL (ref 78–100)
MICRO REPORT STATUS: ABNORMAL
MICROORGANISM SPEC CULT: ABNORMAL
NUM BPU REQUESTED: 2
O2/TOTAL GAS SETTING VFR VENT: 21 %
O2/TOTAL GAS SETTING VFR VENT: NORMAL %
OXYHGB MFR BLDV: 51 %
OXYHGB MFR BLDV: 53 %
OXYHGB MFR BLDV: 54 %
OXYHGB MFR BLDV: 54 %
OXYHGB MFR BLDV: 55 %
OXYHGB MFR BLDV: NORMAL %
PCO2 BLDV: 37 MM HG (ref 40–50)
PCO2 BLDV: 42 MM HG (ref 40–50)
PCO2 BLDV: 43 MM HG (ref 40–50)
PCO2 BLDV: 43 MM HG (ref 40–50)
PCO2 BLDV: 44 MM HG (ref 40–50)
PCO2 BLDV: NORMAL MM HG (ref 40–50)
PH BLDV: 7.41 PH (ref 7.32–7.43)
PH BLDV: 7.42 PH (ref 7.32–7.43)
PH BLDV: 7.43 PH (ref 7.32–7.43)
PH BLDV: 7.43 PH (ref 7.32–7.43)
PH BLDV: 7.48 PH (ref 7.32–7.43)
PH BLDV: NORMAL PH (ref 7.32–7.43)
PLATELET # BLD AUTO: 119 10E9/L (ref 150–450)
PO2 BLDV: 29 MM HG (ref 25–47)
PO2 BLDV: 30 MM HG (ref 25–47)
PO2 BLDV: 31 MM HG (ref 25–47)
PO2 BLDV: NORMAL MM HG (ref 25–47)
POTASSIUM SERPL-SCNC: 3 MMOL/L (ref 3.4–5.3)
POTASSIUM SERPL-SCNC: 3.1 MMOL/L (ref 3.4–5.3)
POTASSIUM SERPL-SCNC: 3.4 MMOL/L (ref 3.4–5.3)
POTASSIUM SERPL-SCNC: 4.2 MMOL/L (ref 3.4–5.3)
PROT SERPL-MCNC: 6.7 G/DL (ref 6.8–8.8)
RBC # BLD AUTO: 2.83 10E12/L (ref 4.4–5.9)
SODIUM SERPL-SCNC: 139 MMOL/L (ref 133–144)
SODIUM SERPL-SCNC: 140 MMOL/L (ref 133–144)
SPECIMEN EXP DATE BLD: NORMAL
SPECIMEN SOURCE: ABNORMAL
TRANSFUSION STATUS PATIENT QL: NORMAL
TRANSFUSION STATUS PATIENT QL: NORMAL
WBC # BLD AUTO: 8.8 10E9/L (ref 4–11)

## 2017-04-16 PROCEDURE — 84132 ASSAY OF SERUM POTASSIUM: CPT | Performed by: INTERNAL MEDICINE

## 2017-04-16 PROCEDURE — 40000986 XR CHEST PORT 1 VW

## 2017-04-16 PROCEDURE — 40000196 ZZH STATISTIC RAPCV CVP MONITORING

## 2017-04-16 PROCEDURE — 20000004 ZZH R&B ICU UMMC

## 2017-04-16 PROCEDURE — 97110 THERAPEUTIC EXERCISES: CPT | Mod: GO

## 2017-04-16 PROCEDURE — 25000128 H RX IP 250 OP 636: Performed by: PEDIATRICS

## 2017-04-16 PROCEDURE — 25000132 ZZH RX MED GY IP 250 OP 250 PS 637: Performed by: INTERNAL MEDICINE

## 2017-04-16 PROCEDURE — P9016 RBC LEUKOCYTES REDUCED: HCPCS | Performed by: INTERNAL MEDICINE

## 2017-04-16 PROCEDURE — 83735 ASSAY OF MAGNESIUM: CPT | Performed by: INTERNAL MEDICINE

## 2017-04-16 PROCEDURE — 40000076 ZZH STATISTIC IABP MONITORING

## 2017-04-16 PROCEDURE — 25000132 ZZH RX MED GY IP 250 OP 250 PS 637: Performed by: STUDENT IN AN ORGANIZED HEALTH CARE EDUCATION/TRAINING PROGRAM

## 2017-04-16 PROCEDURE — 25000132 ZZH RX MED GY IP 250 OP 250 PS 637

## 2017-04-16 PROCEDURE — 85018 HEMOGLOBIN: CPT | Performed by: INTERNAL MEDICINE

## 2017-04-16 PROCEDURE — 99291 CRITICAL CARE FIRST HOUR: CPT | Mod: GC | Performed by: INTERNAL MEDICINE

## 2017-04-16 PROCEDURE — 25000125 ZZHC RX 250: Performed by: INTERNAL MEDICINE

## 2017-04-16 PROCEDURE — 86901 BLOOD TYPING SEROLOGIC RH(D): CPT | Performed by: INTERNAL MEDICINE

## 2017-04-16 PROCEDURE — 25000128 H RX IP 250 OP 636: Performed by: INTERNAL MEDICINE

## 2017-04-16 PROCEDURE — 27210437 ZZH NUTRITION PRODUCT SEMIELEM INTERMED LITER

## 2017-04-16 PROCEDURE — 40000275 ZZH STATISTIC RCP TIME EA 10 MIN

## 2017-04-16 PROCEDURE — S0171 BUMETANIDE 0.5 MG: HCPCS | Performed by: INTERNAL MEDICINE

## 2017-04-16 PROCEDURE — 25000125 ZZHC RX 250

## 2017-04-16 PROCEDURE — 92526 ORAL FUNCTION THERAPY: CPT | Mod: GN | Performed by: SPEECH-LANGUAGE PATHOLOGIST

## 2017-04-16 PROCEDURE — 86900 BLOOD TYPING SEROLOGIC ABO: CPT | Performed by: INTERNAL MEDICINE

## 2017-04-16 PROCEDURE — 40000225 ZZH STATISTIC SLP WARD VISIT: Performed by: SPEECH-LANGUAGE PATHOLOGIST

## 2017-04-16 PROCEDURE — 25000128 H RX IP 250 OP 636: Performed by: STUDENT IN AN ORGANIZED HEALTH CARE EDUCATION/TRAINING PROGRAM

## 2017-04-16 PROCEDURE — S0171 BUMETANIDE 0.5 MG: HCPCS | Performed by: STUDENT IN AN ORGANIZED HEALTH CARE EDUCATION/TRAINING PROGRAM

## 2017-04-16 PROCEDURE — 40000014 ZZH STATISTIC ARTERIAL MONITORING DAILY

## 2017-04-16 PROCEDURE — 25000125 ZZHC RX 250: Performed by: STUDENT IN AN ORGANIZED HEALTH CARE EDUCATION/TRAINING PROGRAM

## 2017-04-16 PROCEDURE — 86850 RBC ANTIBODY SCREEN: CPT | Performed by: INTERNAL MEDICINE

## 2017-04-16 PROCEDURE — 80048 BASIC METABOLIC PNL TOTAL CA: CPT | Performed by: INTERNAL MEDICINE

## 2017-04-16 PROCEDURE — S0171 BUMETANIDE 0.5 MG: HCPCS

## 2017-04-16 PROCEDURE — 86923 COMPATIBILITY TEST ELECTRIC: CPT | Performed by: INTERNAL MEDICINE

## 2017-04-16 PROCEDURE — 00000146 ZZHCL STATISTIC GLUCOSE BY METER IP

## 2017-04-16 PROCEDURE — 85520 HEPARIN ASSAY: CPT | Performed by: INTERNAL MEDICINE

## 2017-04-16 PROCEDURE — 40000048 ZZH STATISTIC DAILY SWAN MONITORING

## 2017-04-16 PROCEDURE — 85027 COMPLETE CBC AUTOMATED: CPT | Performed by: INTERNAL MEDICINE

## 2017-04-16 PROCEDURE — 40000133 ZZH STATISTIC OT WARD VISIT

## 2017-04-16 PROCEDURE — 82805 BLOOD GASES W/O2 SATURATION: CPT | Performed by: INTERNAL MEDICINE

## 2017-04-16 PROCEDURE — 80076 HEPATIC FUNCTION PANEL: CPT | Performed by: INTERNAL MEDICINE

## 2017-04-16 PROCEDURE — 82330 ASSAY OF CALCIUM: CPT | Performed by: INTERNAL MEDICINE

## 2017-04-16 RX ORDER — BUMETANIDE 0.25 MG/ML
2 INJECTION INTRAMUSCULAR; INTRAVENOUS ONCE
Status: COMPLETED | OUTPATIENT
Start: 2017-04-16 | End: 2017-04-16

## 2017-04-16 RX ORDER — CEFTRIAXONE 1 G/1
1 INJECTION, POWDER, FOR SOLUTION INTRAMUSCULAR; INTRAVENOUS EVERY 24 HOURS
Status: DISCONTINUED | OUTPATIENT
Start: 2017-04-16 | End: 2017-04-19

## 2017-04-16 RX ADMIN — POTASSIUM CHLORIDE 20 MEQ: 29.8 INJECTION, SOLUTION INTRAVENOUS at 05:08

## 2017-04-16 RX ADMIN — PIPERACILLIN SODIUM,TAZOBACTAM SODIUM 4.5 G: 4; .5 INJECTION, POWDER, FOR SOLUTION INTRAVENOUS at 01:50

## 2017-04-16 RX ADMIN — POTASSIUM CHLORIDE 20 MEQ: 29.8 INJECTION, SOLUTION INTRAVENOUS at 12:56

## 2017-04-16 RX ADMIN — HYDROMORPHONE HYDROCHLORIDE 0.5 MG: 1 INJECTION, SOLUTION INTRAMUSCULAR; INTRAVENOUS; SUBCUTANEOUS at 03:55

## 2017-04-16 RX ADMIN — MILRINONE LACTATE 0.38 MCG/KG/MIN: 200 INJECTION, SOLUTION INTRAVENOUS at 18:41

## 2017-04-16 RX ADMIN — MILRINONE LACTATE 0.38 MCG/KG/MIN: 200 INJECTION, SOLUTION INTRAVENOUS at 03:25

## 2017-04-16 RX ADMIN — HYDRALAZINE HYDROCHLORIDE 50 MG: 50 TABLET ORAL at 17:33

## 2017-04-16 RX ADMIN — CEFTRIAXONE 1 G: 1 INJECTION, POWDER, FOR SOLUTION INTRAMUSCULAR; INTRAVENOUS at 15:44

## 2017-04-16 RX ADMIN — LIDOCAINE HYDROCHLORIDE: 20 JELLY TOPICAL at 12:58

## 2017-04-16 RX ADMIN — PANTOPRAZOLE SODIUM 40 MG: 40 INJECTION, POWDER, FOR SOLUTION INTRAVENOUS at 19:44

## 2017-04-16 RX ADMIN — BUMETANIDE: 0.25 INJECTION INTRAMUSCULAR; INTRAVENOUS at 10:26

## 2017-04-16 RX ADMIN — Medication 2 G: at 17:33

## 2017-04-16 RX ADMIN — OXYCODONE HYDROCHLORIDE 5 MG: 5 TABLET ORAL at 18:41

## 2017-04-16 RX ADMIN — OXYCODONE HYDROCHLORIDE 5 MG: 5 TABLET ORAL at 15:51

## 2017-04-16 RX ADMIN — POTASSIUM CHLORIDE 40 MEQ: 1.5 POWDER, FOR SOLUTION ORAL at 05:11

## 2017-04-16 RX ADMIN — PANTOPRAZOLE SODIUM 40 MG: 40 INJECTION, POWDER, FOR SOLUTION INTRAVENOUS at 07:51

## 2017-04-16 RX ADMIN — POTASSIUM CHLORIDE 20 MEQ: 29.8 INJECTION, SOLUTION INTRAVENOUS at 21:16

## 2017-04-16 RX ADMIN — MULTIVIT AND MINERALS-FERROUS GLUCONATE 9 MG IRON/15 ML ORAL LIQUID 15 ML: at 07:50

## 2017-04-16 RX ADMIN — HUMAN INSULIN 3 UNITS/HR: 100 INJECTION, SOLUTION SUBCUTANEOUS at 03:00

## 2017-04-16 RX ADMIN — BUMETANIDE 2 MG: 0.25 INJECTION INTRAMUSCULAR; INTRAVENOUS at 16:08

## 2017-04-16 RX ADMIN — ASPIRIN 81 MG CHEWABLE TABLET 81 MG: 81 TABLET CHEWABLE at 07:50

## 2017-04-16 RX ADMIN — HYDRALAZINE HYDROCHLORIDE 50 MG: 50 TABLET ORAL at 23:20

## 2017-04-16 RX ADMIN — BUMETANIDE 2 MG: 0.25 INJECTION INTRAMUSCULAR; INTRAVENOUS at 03:02

## 2017-04-16 RX ADMIN — POLYETHYLENE GLYCOL 3350, SODIUM SULFATE ANHYDROUS, SODIUM BICARBONATE, SODIUM CHLORIDE, POTASSIUM CHLORIDE 500 ML: 236; 22.74; 6.74; 5.86; 2.97 POWDER, FOR SOLUTION ORAL at 17:34

## 2017-04-16 RX ADMIN — POTASSIUM CHLORIDE 20 MEQ: 29.8 INJECTION, SOLUTION INTRAVENOUS at 14:07

## 2017-04-16 RX ADMIN — HYDRALAZINE HYDROCHLORIDE 50 MG: 50 TABLET ORAL at 10:25

## 2017-04-16 RX ADMIN — VANCOMYCIN HYDROCHLORIDE 1250 MG: 10 INJECTION, POWDER, LYOPHILIZED, FOR SOLUTION INTRAVENOUS at 03:00

## 2017-04-16 RX ADMIN — QUETIAPINE FUMARATE 25 MG: 25 TABLET, FILM COATED ORAL at 22:04

## 2017-04-16 RX ADMIN — HUMAN INSULIN 3 UNITS/HR: 100 INJECTION, SOLUTION SUBCUTANEOUS at 21:46

## 2017-04-16 RX ADMIN — HYDRALAZINE HYDROCHLORIDE 50 MG: 50 TABLET ORAL at 05:08

## 2017-04-16 RX ADMIN — POTASSIUM CHLORIDE 40 MEQ: 1.5 POWDER, FOR SOLUTION ORAL at 13:10

## 2017-04-16 RX ADMIN — OXYCODONE HYDROCHLORIDE 10 MG: 5 TABLET ORAL at 23:20

## 2017-04-16 RX ADMIN — PIPERACILLIN SODIUM,TAZOBACTAM SODIUM 4.5 G: 4; .5 INJECTION, POWDER, FOR SOLUTION INTRAVENOUS at 07:51

## 2017-04-16 RX ADMIN — CLOPIDOGREL 75 MG: 75 TABLET, FILM COATED ORAL at 07:50

## 2017-04-16 ASSESSMENT — PAIN DESCRIPTION - DESCRIPTORS: DESCRIPTORS: ACHING

## 2017-04-16 NOTE — PROGRESS NOTES
"[facility]  Cardiology II Service / Advanced Heart Failure  Daily Note 4/15/2017        Interval History:   - Overnight had 4 maroon BMs (small to med sized) with Hb dropping to just below 7, needed 1U pRBC's and responded appropriately.  - Hemodynamically stable, remains on Milrinone 0.375 and IABP 1:1 (off heparin inf)  - PCWP 24 this AM          Intake/Output Summary (Last 24 hours) at 04/16/17 1246  Last data filed at 04/16/17 1100   Gross per 24 hour   Intake          3094.38 ml   Output             3540 ml   Net          -445.62 ml           Pertinent Medications:  I have reviewed this patient's current medications      piperacillin-tazobactam  4.5 g Intravenous Q6H     vancomycin (VANCOCIN) IV  1,250 mg Intravenous Q24H     hydrALAZINE  50 mg Oral Q6H     clopidogrel  75 mg Oral Daily     QUEtiapine  25 mg Oral At Bedtime     pantoprazole  40 mg Intravenous BID     pneumococcal vaccine  0.5 mL Intramuscular Once     sodium chloride (PF)  3 mL Intracatheter Q8H     multivitamins with minerals  15 mL Per Feeding Tube Daily     aspirin  81 mg Oral or Feeding Tube Daily         Examination:  /59  Temp 98.1  F (36.7  C) (Oral)  Resp 22  Ht 1.575 m (5' 2.01\")  Wt 60 kg (132 lb 4.4 oz)  SpO2 100%  BMI 24.19 kg/m2  GEN: A, cooperative and conversational   NECK: can not assess JVP   RESP: crackles   CV: S1S2S3, holossystolic murmur at the apex  ABD: Soft, nontender to palpation, no HSM, +BS, no guarding  EXT: No peripheral edema, warm/well perfused   NEURO: CN II-XII intact, normal 5/5 strength in all extremitites  SKIN: Normal skin turgor, no rash on limited exam    Data:  CMP  Recent Labs  Lab 04/16/17  0350 04/15/17  1717 04/15/17  0350 04/14/17  2345 04/14/17  1751 04/14/17  0407 04/13/17  2205  04/13/17  0958 04/13/17  0306  04/12/17  0352    138 139  --  139 142 140  < >  --  141  < > 141   POTASSIUM 3.0* 4.0 4.0 4.3 3.7 4.0 4.5  < > 3.8 4.0  < > 3.7 "   CHLORIDE 106 105 105  --  105 108 108  < >  --  106  < > 102   CO2 25 25 24  --  24 24 22  < >  --  26  < > 31   ANIONGAP 9 8 11  --  9 10 10  < >  --  9  < > 7   * 129* 157*  --  178* 132* 118*  < >  --  118*  < > 110*   BUN 32* 35* 39*  --  35* 36* 32*  < >  --  35*  < > 46*   CR 1.44* 1.45* 1.42*  --  1.39* 1.50* 1.39*  < >  --  1.42*  < > 1.50*   GFRESTIMATED 52* 52* 53*  --  54* 50* 54*  < >  --  53*  < > 50*   GFRESTBLACK 63 62 64  --  66 60* 66  < >  --  64  < > 60*   ROB 7.1* 7.3* 7.1*  --  7.4* 7.2* 7.4*  < >  --  7.5*  < > 7.8*   MAG 2.3 2.4* 2.4*  --  2.2 2.3 2.2  < > 2.3 2.2  < > 2.6*   PHOS  --   --  3.0  --   --   --  2.8  --  3.6  --   --  3.8   PROTTOTAL 6.7*  --  6.7*  --   --  6.7*  --   --   --  6.9  --  6.4*   ALBUMIN 2.2*  --  2.3*  --   --  2.3*  --   --   --  2.5*  --  2.4*   BILITOTAL 0.9  --  0.8  --   --  0.7  --   --   --  1.1  --  1.0   ALKPHOS 62  --  71  --   --  62  --   --   --  64  --  69   AST 21  --  27  --   --  29  --   --   --  30  --  30   ALT 40  --  47  --   --  57  --   --   --  72*  --  83*   < > = values in this interval not displayed.  CBC    Recent Labs  Lab 04/16/17  0350 04/15/17  4523 04/15/17  1925 04/15/17  1416 04/15/17  0350  04/14/17  0407   WBC 8.8  --  9.3  --  11.1*  --  11.1*   RBC 2.83*  --  2.40*  --  2.47*  --  2.60*   HGB 8.4* 6.9* 7.2* 7.1* 7.4*  < > 7.8*   HCT 27.4*  --  23.8*  --  24.9*  --  26.3*   MCV 97  --  99  --  101*  --  101*   MCH 29.7  --  30.0  --  30.0  --  30.0   MCHC 30.7*  --  30.3*  --  29.7*  --  29.7*   RDW 17.5*  --  17.6*  --  17.7*  --  18.1*   *  --  128*  --  131*  --  127*   < > = values in this interval not displayed.  INR    Recent Labs  Lab 04/12/17  0352 04/11/17  0254 04/10/17  0301   INR 1.12 1.07 1.12     Arterial Blood Gas    Recent Labs  Lab 04/16/17  0350 04/15/17  2353 04/15/17  1932 04/15/17  1717   O2PER 4L 2L 2LNC 2LNC       Imaging Studies:  Echo 3/27  The visual ejection fraction is estimated at  30-35%.  There is extensive inferior, inferoseptal, and inferolateral wall akinesis.  Basal/mid lateral wall hypokinesis  There is moderate to severe septal hypokinesis.  Septal wall motion abnormality may reflect pacemaker activation.  There is a catheter/pacemaker lead seen in the right ventricle.  The right ventricle is mildly dilated.  Severely decreased right ventricular systolic function  There is no pericardial effusion.  The rhythm was paced.  Compared to the prior study, the LVEF appears somewhat decreased. Septal wall  motion abnormality larger- may reflect, in part, that patient in sinus tach on  prior study, and ventricular paced now. No hemodynamically significant  valvular abnormalities on 2D or color flow imaging     CT chest/abdomen 3/27   IMPRESSION:   1. Small mediastinal hemorrhage, small bilateral pleural effusions  which may be hemorrhagic. Small intraperitoneal hemorrhage. Minimal  pericardial hemorrhage.  2. Minimal pneumoperitoneum in the left paracolic gutter, of unknown  significance. No pneumatosis as clinically questioned.  3. IABP slightly low in position.  4. Bilateral pulmonary dependent consolidations represent compression  atelectasis, however differentials include aspiration.     Cath 3/26 [facility]  SUMMARY:   --Inferior STEMI w/ late presentation. Culprit dictated by complete  heart block, and cardiogenic shock. Emergent echo in the Cath Lab also  shows significant RV dysfunction.  --Three vessel coronary artery disease with diffuse coronary disease  out to the distal vessels  --Successful POBA of the prox and mid-RCA. Stent was not placed, in  anticipation he may need to be considered for bypass surgery in the  near future  --Insertion of a temporary pacemaker for bradycardia (AVB) and  hypotension  --Insertion of a IABP  --Upper GI bleed consistent with [place]     Cath [place] 3/27     CT head 3/28: normal      Echo 3/29  Interpretation Summary  Limited study.  Ischemic CM. Moderately (EF 30-35%) reduced left ventricular  function is present. Traced at 32%.  Posterior wall akinesis is present. Lateral wall akinesis is present. Inferior  wall akinesis is present.  Right ventricular function, chamber size, wall motion, and thickness are  normal.  Dilation of the inferior vena cava is present with abnormal respiratory  variation in diameter.     Compared to prior study, RV fxn is improved.     Echo 3/31  Left Ventricle  The Ejection Fraction is estimated at 50-55%. Inferior,posterior,lateral,basal  septal wall akinesis as reported before. No change.     CORONARY ANGIOGRAM 4/1:   1. Both coronary arteries arise from their respective cusps.  2. Right dominant.  3. LM has mild luminal irregularities.   4. LAD supplies the apex along with the RCA (type 2 LAD) and gives rise to septal perforators and a large and branching D1. There are widely patent stents extending from the proximal to mid LAD. The dLAD has mild to moderate disease to 30% stenosis. The D1 is jailed by the LAD stents, but has EBER 3 flow with disease to 30% stenosis.   5. The small ramus is no longer present.  6. LCX is occluded at the ostium.   7. RCA gives rise to PL branches and supplies PDA. There are widely patent stents extending from the proximal to mid RCA. The remainder of the mRCA has disease to 50% stenosis and the dRCA has disease to 10% stenosis. The RPDA has a widely patent stent and the remainder of the artery has mild disease to 10% stenosis. The RPLA has small vessels with diffuse disease including a distal branch occlusion. The RPLA supplies some small collateral branches to the dLCx.      COMPLICATIONS:  1. None      SUMMARY:   1. Cardiogenic shock following an inferior STEMI.  2. The LCx re-occlusion is not surprising as the recently revascularized LCx  had poor outflow and the revascularized portion did not supply a large territory (limited to no flow was present distal to the stented  segment immediately following stenting). Repeat revascularization of the LCx would result in the same outcome of repeat closure and also risk embolizing thrombus from the LCx into the LAD so no treatment of the occluded LCx was performed.   3. Three vessel coronary artery disease with patent LAD and RCA stents and an occluded LCx.     Echo 4/3  Left ventricular size is normal.  The Ejection Fraction is estimated at 35-40%.  Inferior wall akinesis is present.  Lateral wall akinesis is present.  Posterior wall akinesis is present.  Right ventricular function, chamber size, wall motion, and thickness are  normal.  Moderate mitral insufficiency is present.  Moderate tricuspid insufficiency is present.  Right ventricular systolic pressure is 47mmHg above the right atrial pressure.  The inferior vena cava is normal.     Echo 4/5  Moderately (EF 35-40%) reduced left ventricular function is present. Traced at  40%.  Moderate mitral insufficiency is present.  The cause of the mitral insufficiency appears to be restricted posterior  leaflet from posterior MI. No mitral leaflet pathology seen.  Dilation of the inferior vena cava is present with abnormal respiratory  variation in diameter.     CT abd without contrast 4/4  IMPRESSION:   1. No evidence of ischemic bowel, obstruction, or intra-abdominal  infection.  2. Bibasilar patchy pulmonary opacities likely represent combination  of aspiration and atelectasis.  3. Small amount of simple free fluid within the lower abdomen.  4. Small left retroperitoneal hematoma along the iliac vasculature  likely postprocedural.   5. Unchanged size of bilateral pleural effusions, which now consist of  simple fluid.    Assessment and Plan  51 year old man presented with cardiogenic shock from inferior STEMI s/p RCA aspiration thrombectomy and POBA on 3/26 at [facility] s/p IABP and initiation of Dopamine, also during procedure, CHB s/p temp pacer, emesis/hematmesis and altered mental status  s/p intubation transferred here for consideration of mechanical support on 3/27. Had PCI to RCA, LAD and LCx (on ASA and plavix) started on epinephrine in addition to dopamine, post procedure developed distributive shock picture from infection (aspiration pneumonia? Sputum cx growing staph aureus, on vanco and zosyn) vs post-MI SIRS response. Currently in cardiogenic shock with IABP in situ    Card  # Cardiogenic shock 4/3- from underlying 3v CAD-has had high SVR and low CI, complicated by ischemic MR  # Due to inferior wall STEMI. S/P 7 stents to LAD, circ, RCA (angiogram report pending). Now with IABP, temporary pacemaker after 3rd deg heart block in cath lab at [facility] on 3/26   # Small pericardial hemorrhage   - IABP in place  - Hydral 50mg q6H, once his SCr improves will consider starting Nipride for a more stable afterload reduction regimen, and give him an IABP wean trial  - Hold off on isordil for now  - LVAD work up given SCr improving  - Cortisol normal  - Continue Milrinone 0.375  - Plan to keep CVP 9-12 today, net 500 cc to 1 L negative  - GI planning for colonoscopy tomorrow, Golytely prep today, clear liquid diet, and NPO after midnight      # Neuro  - alert, follows commands  - WBC stable   - Seroquel 25 mg         Respiratory  #Intubated for airway protection due to mental status and emesis on 3/26, extubated 4/3  #Probable Aspiration PNA/pneumonitis vs MSSA pneumonia         # ID  - UA yesterday with cloudy urine, 50 WBCs, many bacteria, no nitrates. He is already on Vanc and Zosyn  - fever on 4/3-type 2 MI vs infection (unclear source)  - Sepsis from aspiration pneumonia/pneumonitis vs MSSA pneumonia vs post-MI SIRS response initially on 3/28  - repeat cultures if febrile  - He was on Unasyn. Switched to vanco and zosyn (4/3 - 4/7) for fever and elevated lactate concern for sepsis  - Currently off antibiotics         # Endocrine  T2DM: HA1C 7.5 on admission.  - Insulin gtt per nurising protocol  with hypoglycemia precautions.       # GI  - GI planning for colonoscopy tomorrow, Golytely prep today, clear liquid diet, and NPO after midnight  - Had several maroon BMs since 4/11, Hb overall dropped by a 1.5 grams or so, heparin inf held and GI consulted  - Will monitor Hb  - Protonix 40 IV BID     Minimal pneumoperitoneum, unclear etiology: Seen by General surgery.  - had bloody NG output earlier in hospitalization.  - Conservative Mx for now        # Renal/  Renal function slightly improved- from hypoperfusion from cardiogenic shock 4/5 plus central venous congestion plus contrast nephropathy  -Diuresis  - Daily Cr  - Avoid nephrotoxins as able  - Plan to make 500cc to 1L net negative today     -Hypernatremia  - Resolved. Stopped his D5W, only getting FWF's.    #Heme  -Dropping Hgb. Will keep an eye.        FEN: feeding tube  Code: FULL  PPx: PPI 40mg daily, senna PRN  Dispo: pending stability        Plan of care discussed with Dr. Estiven Loving MD  Cardiovascular Disease Fellow  Pager: [phone]      I have reviewed today's vital signs, notes, medications, labs and imaging.  I have also seen and examined the patient and agree with the findings and plan as outlined above.  Pt with no complaints. , /47 with IABP 1:1. CI 2.4 on milrinone 0.375 mcg/kg/min.  Lungs clear and S1 and S2 with loud S3 and III/VI HSM at apex.  Labs with Cr 1.44 and WBC 8.8.  Assessment: Pt with IWMI with MR and LV dysfn now on milrinone therapy.  Will schedule SC IABP and wean groin IABP to see if pt more stable on milrinone.  Pt seen X3 with total critical care time 40 min.     Vikram Jean-Baptiste MD, PhD  Professor, Heart Failure and Cardiac Transplantation  [facility]

## 2017-04-16 NOTE — PLAN OF CARE
Problem: Goal Outcome Summary  Goal: Goal Outcome Summary  OT 4E: Facilitated increased strength and activity tolerance for ADLs and functional mobility. Pt alert following 100% of single-step commands, pt intermittently demonstrated difficultly with problem-solving and following multi-step commands. Pt completed LLE supine exercises and BUE theraband exercises to maintain strength for ADLs. Pt tolerated exercises well. Pt's max  O2 sats >97%.      Defer discharge recommendations at this time as pt planned for potential LVAD and unable to assess mobility due to bedrest restrictions. Will update as able.

## 2017-04-16 NOTE — PROGRESS NOTES
"      GASTROENTEROLOGY PROGRESS NOTE        ASSESSMENT/ RECOMMENDATIONS:    51 year old male with DMII, newly diagnosed CAD s/p STEMI on 3/26 and subsequent PCI, balloon pump use complicated by distributive and cardiogenic shock who we are asked to see for maroon stools. No major drop in hemoglobin. But needs  to be evaluated as he need to be on IV heparin.     Recommendations:    Plan for colonoscopy/flex sig tomorrow. Start Golytely prep today evening, clear liquid diet. Will attempt full colonoscopy tomorrow, if poor prep flex sig.     Daily CBC, transfusion goals per primary team.    The patient was discussed and plan agreed upon with GI staff, Dr. Zaldivar.     Nel Locke  [facility]  Gastroenterology Fellow  _______________________________________________________________      S: 3 - 4 maroon stools according to nursing staff overnight, restarted on heparin yesterday and stopped overnight. This morning had brown stools according to nursing staff. Denied any chest pain or SOB. Hb 6.9 and got 1 unit PRBC and increased to 8.4.     O:  Blood pressure 109/59, temperature 98.8  F (37.1  C), temperature source Oral, resp. rate 16, height 1.575 m (5' 2.01\"), weight 60 kg (132 lb 4.4 oz), SpO2 95 %.    Gen: no distress  CV: normal S1, S2  Lungs: CTAB  Abd: soft, non tender    LABS:  BMP  Recent Labs  Lab 04/16/17  1132 04/16/17  0350 04/15/17  1717 04/15/17  0350  04/14/17  1751   NA  --  140 138 139  --  139   POTASSIUM 3.1* 3.0* 4.0 4.0  < > 3.7   CHLORIDE  --  106 105 105  --  105   ROB  --  7.1* 7.3* 7.1*  --  7.4*   CO2  --  25 25 24  --  24   BUN  --  32* 35* 39*  --  35*   CR  --  1.44* 1.45* 1.42*  --  1.39*   GLC  --  132* 129* 157*  --  178*   < > = values in this interval not displayed.  CBC  Recent Labs  Lab 04/16/17  0350  04/15/17  1925  04/15/17  0350  04/14/17  0407   WBC 8.8  --  9.3  --  11.1*  --  11.1*   RBC 2.83*  --  2.40*  --  2.47*  --  2.60*   HGB 8.4*  < > 7.2*  < > " 7.4*  < > 7.8*   HCT 27.4*  --  23.8*  --  24.9*  --  26.3*   MCV 97  --  99  --  101*  --  101*   MCH 29.7  --  30.0  --  30.0  --  30.0   MCHC 30.7*  --  30.3*  --  29.7*  --  29.7*   RDW 17.5*  --  17.6*  --  17.7*  --  18.1*   *  --  128*  --  131*  --  127*   < > = values in this interval not displayed.  INR  Recent Labs  Lab 04/12/17  0352 04/11/17  0254 04/10/17  0301   INR 1.12 1.07 1.12     LFTs  Recent Labs  Lab 04/16/17  0350 04/15/17  0350 04/14/17  0407 04/13/17  0306   ALKPHOS 62 71 62 64   AST 21 27 29 30   ALT 40 47 57 72*   BILITOTAL 0.9 0.8 0.7 1.1   PROTTOTAL 6.7* 6.7* 6.7* 6.9   ALBUMIN 2.2* 2.3* 2.3* 2.5*      PANCNo lab results found in last 7 days.

## 2017-04-16 NOTE — PLAN OF CARE
"Problem: Goal Outcome Summary  Goal: Goal Outcome Summary  Outcome: No Change  Alert and orientated x. Speech clear. Continues to moan out loud. When asked if he is in pain he says \"no\".  Sinus tach 120's. Afebrile. IABP at 1:1, 100% augmented. Vss. Dobutamine dc 'd. Milrinone increased to .375 mcg/kg/min. PA 40/20. CO-3.9, CI-2.4.  Lung fields coarse to diminished. Cough effort weak, non productive. PO2 increased to 4 al/nc.,Weaned back down after Bumex given.  Chavis to gravity. Bumex given x 1 after I Unit rbc's for a Hgb of 6.9  Frequent soft non formed maroon stools. Heparin drip off for Hgb of 6.9.  Continue to follow plan of care. Replace electrolytes. Update team with changes       "

## 2017-04-16 NOTE — PLAN OF CARE
Problem: Goal Outcome Summary  Goal: Goal Outcome Summary  Pt seen for dysphagia therapy. Pt continues to be at increased risk for aspiration due to physical positioning requirements. Recommend pt continue with regular consistency diet and thin liquids. Pt to self select softer items over the next several days. Sit pt as upright as possible. Encourage small bites/sips and slow rate. SLP to follow for diet consistency tolerance and continued education on safe swallowing precautions.

## 2017-04-16 NOTE — PROGRESS NOTES
Social Work Services Progress Note    Hospital Day: 21  Date of Initial Social Work Evaluation:  3/28/17  Collaborated with:  Patient, pt's son, bedside RN, chart review    Data:    Per chart review patient is a 51 year old male who remains in ICU for LVAD w/u.     Intervention:    Weekday SW requested writer to meet with patient and family and assist in completing HCD and having it notarized. SW met with patient and pt's son at bedside. Pt's son reported that his stepmother (pt's wife) had the HCD at home and pt's son was unclear if it has been completed yet or not. SW explained supportive role of SW and notary service. Pt's son reported he will speak with pt's wife and have her bring HCD document in when completed to have document notarized.     Assessment:  SW following to support with HCD. Pt's son appears to understand HCD process.     Plan:    Anticipated Disposition:  To be determined     Barriers to d/c plan:  Medical stability, LVAD w/u    Follow Up:  SW following for support with HCD documentation. SW will provide update to weekday SW.    JACK Wren  [facility]   Pgr: [phone]

## 2017-04-17 ENCOUNTER — APPOINTMENT (OUTPATIENT)
Dept: GENERAL RADIOLOGY | Facility: CLINIC | Age: 52
DRG: 228 | End: 2017-04-17
Attending: INTERNAL MEDICINE
Payer: COMMERCIAL

## 2017-04-17 ENCOUNTER — OFFICE VISIT (OUTPATIENT)
Dept: INTERPRETER SERVICES | Facility: CLINIC | Age: 52
End: 2017-04-17

## 2017-04-17 ENCOUNTER — APPOINTMENT (OUTPATIENT)
Dept: OCCUPATIONAL THERAPY | Facility: CLINIC | Age: 52
DRG: 228 | End: 2017-04-17
Attending: INTERNAL MEDICINE
Payer: COMMERCIAL

## 2017-04-17 LAB
ALBUMIN SERPL-MCNC: 2.2 G/DL (ref 3.4–5)
ALP SERPL-CCNC: 55 U/L (ref 40–150)
ALT SERPL W P-5'-P-CCNC: 36 U/L (ref 0–70)
ANION GAP SERPL CALCULATED.3IONS-SCNC: 8 MMOL/L (ref 3–14)
ANION GAP SERPL CALCULATED.3IONS-SCNC: 8 MMOL/L (ref 3–14)
AST SERPL W P-5'-P-CCNC: 11 U/L (ref 0–45)
BACTERIA SPEC CULT: NORMAL
BASE EXCESS BLDV CALC-SCNC: 3 MMOL/L
BASE EXCESS BLDV CALC-SCNC: 3.5 MMOL/L
BASE EXCESS BLDV CALC-SCNC: 4.1 MMOL/L
BASE EXCESS BLDV CALC-SCNC: 5.2 MMOL/L
BASE EXCESS BLDV CALC-SCNC: 5.3 MMOL/L
BASE EXCESS BLDV CALC-SCNC: 6 MMOL/L
BILIRUB DIRECT SERPL-MCNC: 0.2 MG/DL (ref 0–0.2)
BILIRUB SERPL-MCNC: 0.6 MG/DL (ref 0.2–1.3)
BLD PROD TYP BPU: NORMAL
BLD UNIT ID BPU: 0
BLOOD PRODUCT CODE: NORMAL
BPU ID: NORMAL
BUN SERPL-MCNC: 21 MG/DL (ref 7–30)
BUN SERPL-MCNC: 24 MG/DL (ref 7–30)
CA-I BLD-MCNC: 3.9 MG/DL (ref 4.4–5.2)
CALCIUM SERPL-MCNC: 7 MG/DL (ref 8.5–10.1)
CALCIUM SERPL-MCNC: 7 MG/DL (ref 8.5–10.1)
CHLORIDE SERPL-SCNC: 108 MMOL/L (ref 94–109)
CHLORIDE SERPL-SCNC: 108 MMOL/L (ref 94–109)
CMV IGG SERPL QL IA: ABNORMAL AI (ref 0–0.8)
CO2 SERPL-SCNC: 26 MMOL/L (ref 20–32)
CO2 SERPL-SCNC: 30 MMOL/L (ref 20–32)
COLONOSCOPY: NORMAL
CREAT SERPL-MCNC: 0.99 MG/DL (ref 0.66–1.25)
CREAT SERPL-MCNC: 1.16 MG/DL (ref 0.66–1.25)
EBV VCA IGG SER QL IA: ABNORMAL AI (ref 0–0.8)
ERYTHROCYTE [DISTWIDTH] IN BLOOD BY AUTOMATED COUNT: 18 % (ref 10–15)
ERYTHROCYTE [DISTWIDTH] IN BLOOD BY AUTOMATED COUNT: 18.1 % (ref 10–15)
GFR SERPL CREATININE-BSD FRML MDRD: 67 ML/MIN/1.7M2
GFR SERPL CREATININE-BSD FRML MDRD: 80 ML/MIN/1.7M2
GLUCOSE BLDC GLUCOMTR-MCNC: 101 MG/DL (ref 70–99)
GLUCOSE BLDC GLUCOMTR-MCNC: 105 MG/DL (ref 70–99)
GLUCOSE BLDC GLUCOMTR-MCNC: 108 MG/DL (ref 70–99)
GLUCOSE BLDC GLUCOMTR-MCNC: 109 MG/DL (ref 70–99)
GLUCOSE BLDC GLUCOMTR-MCNC: 110 MG/DL (ref 70–99)
GLUCOSE BLDC GLUCOMTR-MCNC: 112 MG/DL (ref 70–99)
GLUCOSE BLDC GLUCOMTR-MCNC: 113 MG/DL (ref 70–99)
GLUCOSE BLDC GLUCOMTR-MCNC: 114 MG/DL (ref 70–99)
GLUCOSE BLDC GLUCOMTR-MCNC: 114 MG/DL (ref 70–99)
GLUCOSE BLDC GLUCOMTR-MCNC: 116 MG/DL (ref 70–99)
GLUCOSE BLDC GLUCOMTR-MCNC: 117 MG/DL (ref 70–99)
GLUCOSE BLDC GLUCOMTR-MCNC: 119 MG/DL (ref 70–99)
GLUCOSE BLDC GLUCOMTR-MCNC: 120 MG/DL (ref 70–99)
GLUCOSE BLDC GLUCOMTR-MCNC: 125 MG/DL (ref 70–99)
GLUCOSE BLDC GLUCOMTR-MCNC: 133 MG/DL (ref 70–99)
GLUCOSE BLDC GLUCOMTR-MCNC: 141 MG/DL (ref 70–99)
GLUCOSE BLDC GLUCOMTR-MCNC: 147 MG/DL (ref 70–99)
GLUCOSE BLDC GLUCOMTR-MCNC: 205 MG/DL (ref 70–99)
GLUCOSE SERPL-MCNC: 104 MG/DL (ref 70–99)
GLUCOSE SERPL-MCNC: 99 MG/DL (ref 70–99)
HBV CORE AB SERPL QL IA: ABNORMAL
HBV SURFACE AB SERPL IA-ACNC: 66.68 M[IU]/ML
HBV SURFACE AG SERPL QL IA: NONREACTIVE
HCO3 BLDV-SCNC: 28 MMOL/L (ref 21–28)
HCO3 BLDV-SCNC: 30 MMOL/L (ref 21–28)
HCO3 BLDV-SCNC: 30 MMOL/L (ref 21–28)
HCO3 BLDV-SCNC: 31 MMOL/L (ref 21–28)
HCT VFR BLD AUTO: 27.3 % (ref 40–53)
HCT VFR BLD AUTO: 29.2 % (ref 40–53)
HCV AB SERPL QL IA: NORMAL
HGB BLD-MCNC: 8.3 G/DL (ref 13.3–17.7)
HGB BLD-MCNC: 9.1 G/DL (ref 13.3–17.7)
HIV 1+2 AB+HIV1 P24 AG SERPL QL IA: NORMAL
HSV1 IGG SERPL QL IA: 7.6 AI (ref 0–0.8)
HSV2 IGG SERPL QL IA: 0.3 AI (ref 0–0.8)
LMWH PPP CHRO-ACNC: NORMAL IU/ML
MAGNESIUM SERPL-MCNC: 2.4 MG/DL (ref 1.6–2.3)
MAGNESIUM SERPL-MCNC: 2.5 MG/DL (ref 1.6–2.3)
MCH RBC QN AUTO: 29.5 PG (ref 26.5–33)
MCH RBC QN AUTO: 30.5 PG (ref 26.5–33)
MCHC RBC AUTO-ENTMCNC: 30.4 G/DL (ref 31.5–36.5)
MCHC RBC AUTO-ENTMCNC: 31.2 G/DL (ref 31.5–36.5)
MCV RBC AUTO: 97 FL (ref 78–100)
MCV RBC AUTO: 98 FL (ref 78–100)
MICRO REPORT STATUS: NORMAL
O2/TOTAL GAS SETTING VFR VENT: 21 %
O2/TOTAL GAS SETTING VFR VENT: 21 %
O2/TOTAL GAS SETTING VFR VENT: ABNORMAL %
O2/TOTAL GAS SETTING VFR VENT: NORMAL %
OXYHGB MFR BLDV: 50 %
OXYHGB MFR BLDV: 50 %
OXYHGB MFR BLDV: 52 %
OXYHGB MFR BLDV: 53 %
OXYHGB MFR BLDV: 56 %
OXYHGB MFR BLDV: 64 %
PCO2 BLDV: 38 MM HG (ref 40–50)
PCO2 BLDV: 41 MM HG (ref 40–50)
PCO2 BLDV: 41 MM HG (ref 40–50)
PCO2 BLDV: 42 MM HG (ref 40–50)
PCO2 BLDV: 42 MM HG (ref 40–50)
PCO2 BLDV: 45 MM HG (ref 40–50)
PH BLDV: 7.43 PH (ref 7.32–7.43)
PH BLDV: 7.45 PH (ref 7.32–7.43)
PH BLDV: 7.45 PH (ref 7.32–7.43)
PH BLDV: 7.46 PH (ref 7.32–7.43)
PH BLDV: 7.47 PH (ref 7.32–7.43)
PH BLDV: 7.48 PH (ref 7.32–7.43)
PLATELET # BLD AUTO: 128 10E9/L (ref 150–450)
PLATELET # BLD AUTO: 130 10E9/L (ref 150–450)
PO2 BLDV: 29 MM HG (ref 25–47)
PO2 BLDV: 31 MM HG (ref 25–47)
PO2 BLDV: 33 MM HG (ref 25–47)
PO2 BLDV: 36 MM HG (ref 25–47)
POTASSIUM BLD-SCNC: 3.4 MMOL/L (ref 3.4–5.3)
POTASSIUM SERPL-SCNC: 3.6 MMOL/L (ref 3.4–5.3)
POTASSIUM SERPL-SCNC: 3.8 MMOL/L (ref 3.4–5.3)
PROT SERPL-MCNC: 6.5 G/DL (ref 6.8–8.8)
RBC # BLD AUTO: 2.81 10E12/L (ref 4.4–5.9)
RBC # BLD AUTO: 2.98 10E12/L (ref 4.4–5.9)
SODIUM SERPL-SCNC: 143 MMOL/L (ref 133–144)
SODIUM SERPL-SCNC: 145 MMOL/L (ref 133–144)
SPECIMEN SOURCE: NORMAL
TRANSFUSION STATUS PATIENT QL: NORMAL
TRANSFUSION STATUS PATIENT QL: NORMAL
VZV IGG SER QL IA: 5.2 AI (ref 0–0.8)
WBC # BLD AUTO: 8.7 10E9/L (ref 4–11)
WBC # BLD AUTO: 9.4 10E9/L (ref 4–11)

## 2017-04-17 PROCEDURE — 84132 ASSAY OF SERUM POTASSIUM: CPT | Performed by: INTERNAL MEDICINE

## 2017-04-17 PROCEDURE — 40000048 ZZH STATISTIC DAILY SWAN MONITORING

## 2017-04-17 PROCEDURE — 40000014 ZZH STATISTIC ARTERIAL MONITORING DAILY

## 2017-04-17 PROCEDURE — 82330 ASSAY OF CALCIUM: CPT | Performed by: INTERNAL MEDICINE

## 2017-04-17 PROCEDURE — 25000132 ZZH RX MED GY IP 250 OP 250 PS 637

## 2017-04-17 PROCEDURE — 85520 HEPARIN ASSAY: CPT | Performed by: INTERNAL MEDICINE

## 2017-04-17 PROCEDURE — 97110 THERAPEUTIC EXERCISES: CPT | Mod: GO

## 2017-04-17 PROCEDURE — 0DJD8ZZ INSPECTION OF LOWER INTESTINAL TRACT, VIA NATURAL OR ARTIFICIAL OPENING ENDOSCOPIC: ICD-10-PCS | Performed by: INTERNAL MEDICINE

## 2017-04-17 PROCEDURE — 40000196 ZZH STATISTIC RAPCV CVP MONITORING

## 2017-04-17 PROCEDURE — 99291 CRITICAL CARE FIRST HOUR: CPT | Mod: GC | Performed by: INTERNAL MEDICINE

## 2017-04-17 PROCEDURE — 83883 ASSAY NEPHELOMETRY NOT SPEC: CPT | Performed by: INTERNAL MEDICINE

## 2017-04-17 PROCEDURE — 82805 BLOOD GASES W/O2 SATURATION: CPT | Performed by: INTERNAL MEDICINE

## 2017-04-17 PROCEDURE — 86480 TB TEST CELL IMMUN MEASURE: CPT | Performed by: INTERNAL MEDICINE

## 2017-04-17 PROCEDURE — 25000132 ZZH RX MED GY IP 250 OP 250 PS 637: Performed by: INTERNAL MEDICINE

## 2017-04-17 PROCEDURE — 00000402 ZZHCL STATISTIC TOTAL PROTEIN: Performed by: INTERNAL MEDICINE

## 2017-04-17 PROCEDURE — S0171 BUMETANIDE 0.5 MG: HCPCS

## 2017-04-17 PROCEDURE — 27210437 ZZH NUTRITION PRODUCT SEMIELEM INTERMED LITER

## 2017-04-17 PROCEDURE — 25000128 H RX IP 250 OP 636: Performed by: STUDENT IN AN ORGANIZED HEALTH CARE EDUCATION/TRAINING PROGRAM

## 2017-04-17 PROCEDURE — 25000125 ZZHC RX 250

## 2017-04-17 PROCEDURE — 00000146 ZZHCL STATISTIC GLUCOSE BY METER IP

## 2017-04-17 PROCEDURE — 85027 COMPLETE CBC AUTOMATED: CPT | Performed by: INTERNAL MEDICINE

## 2017-04-17 PROCEDURE — 25000125 ZZHC RX 250: Performed by: STUDENT IN AN ORGANIZED HEALTH CARE EDUCATION/TRAINING PROGRAM

## 2017-04-17 PROCEDURE — 84166 PROTEIN E-PHORESIS/URINE/CSF: CPT | Performed by: INTERNAL MEDICINE

## 2017-04-17 PROCEDURE — 80076 HEPATIC FUNCTION PANEL: CPT | Performed by: INTERNAL MEDICINE

## 2017-04-17 PROCEDURE — 25000128 H RX IP 250 OP 636: Performed by: PEDIATRICS

## 2017-04-17 PROCEDURE — T1013 SIGN LANG/ORAL INTERPRETER: HCPCS | Mod: U3

## 2017-04-17 PROCEDURE — 25000128 H RX IP 250 OP 636

## 2017-04-17 PROCEDURE — 80048 BASIC METABOLIC PNL TOTAL CA: CPT | Performed by: INTERNAL MEDICINE

## 2017-04-17 PROCEDURE — 84165 PROTEIN E-PHORESIS SERUM: CPT | Performed by: INTERNAL MEDICINE

## 2017-04-17 PROCEDURE — 45378 DIAGNOSTIC COLONOSCOPY: CPT | Performed by: INTERNAL MEDICINE

## 2017-04-17 PROCEDURE — 84100 ASSAY OF PHOSPHORUS: CPT | Performed by: INTERNAL MEDICINE

## 2017-04-17 PROCEDURE — 20000004 ZZH R&B ICU UMMC

## 2017-04-17 PROCEDURE — 71010 XR CHEST PORT 1 VW: CPT

## 2017-04-17 PROCEDURE — 25000132 ZZH RX MED GY IP 250 OP 250 PS 637: Performed by: STUDENT IN AN ORGANIZED HEALTH CARE EDUCATION/TRAINING PROGRAM

## 2017-04-17 PROCEDURE — 40000275 ZZH STATISTIC RCP TIME EA 10 MIN

## 2017-04-17 PROCEDURE — 40000133 ZZH STATISTIC OT WARD VISIT

## 2017-04-17 PROCEDURE — 99211 OFF/OP EST MAY X REQ PHY/QHP: CPT

## 2017-04-17 PROCEDURE — 83735 ASSAY OF MAGNESIUM: CPT | Performed by: INTERNAL MEDICINE

## 2017-04-17 PROCEDURE — S0164 INJECTION PANTROPRAZOLE: HCPCS

## 2017-04-17 PROCEDURE — 85027 COMPLETE CBC AUTOMATED: CPT

## 2017-04-17 RX ORDER — ISOSORBIDE DINITRATE 10 MG/1
10 TABLET ORAL
Status: DISCONTINUED | OUTPATIENT
Start: 2017-04-17 | End: 2017-04-20

## 2017-04-17 RX ORDER — FENTANYL CITRATE 50 UG/ML
INJECTION, SOLUTION INTRAMUSCULAR; INTRAVENOUS
Status: COMPLETED
Start: 2017-04-17 | End: 2017-04-17

## 2017-04-17 RX ORDER — ATORVASTATIN CALCIUM 40 MG/1
40 TABLET, FILM COATED ORAL
Status: DISCONTINUED | OUTPATIENT
Start: 2017-04-17 | End: 2017-05-12 | Stop reason: HOSPADM

## 2017-04-17 RX ORDER — HEPARIN SODIUM 10000 [USP'U]/100ML
0-3500 INJECTION, SOLUTION INTRAVENOUS CONTINUOUS
Status: DISCONTINUED | OUTPATIENT
Start: 2017-04-18 | End: 2017-04-20

## 2017-04-17 RX ORDER — BUMETANIDE 0.25 MG/ML
2 INJECTION INTRAMUSCULAR; INTRAVENOUS EVERY 6 HOURS
Status: COMPLETED | OUTPATIENT
Start: 2017-04-17 | End: 2017-04-18

## 2017-04-17 RX ORDER — FENTANYL CITRATE 50 UG/ML
25 INJECTION, SOLUTION INTRAMUSCULAR; INTRAVENOUS ONCE
Status: COMPLETED | OUTPATIENT
Start: 2017-04-17 | End: 2017-04-17

## 2017-04-17 RX ORDER — DEXTROSE MONOHYDRATE 25 G/50ML
25-50 INJECTION, SOLUTION INTRAVENOUS
Status: DISCONTINUED | OUTPATIENT
Start: 2017-04-17 | End: 2017-04-20

## 2017-04-17 RX ORDER — NICOTINE POLACRILEX 4 MG
15-30 LOZENGE BUCCAL
Status: DISCONTINUED | OUTPATIENT
Start: 2017-04-17 | End: 2017-04-20

## 2017-04-17 RX ADMIN — POTASSIUM CHLORIDE 20 MEQ: 29.8 INJECTION, SOLUTION INTRAVENOUS at 05:23

## 2017-04-17 RX ADMIN — MIDAZOLAM 0.5 MG: 1 INJECTION INTRAMUSCULAR; INTRAVENOUS at 14:04

## 2017-04-17 RX ADMIN — POTASSIUM CHLORIDE 20 MEQ: 29.8 INJECTION, SOLUTION INTRAVENOUS at 01:33

## 2017-04-17 RX ADMIN — ISOSORBIDE DINITRATE 10 MG: 10 TABLET ORAL at 12:24

## 2017-04-17 RX ADMIN — PANTOPRAZOLE SODIUM 40 MG: 40 INJECTION, POWDER, FOR SOLUTION INTRAVENOUS at 20:00

## 2017-04-17 RX ADMIN — HYDRALAZINE HYDROCHLORIDE 50 MG: 50 TABLET ORAL at 11:31

## 2017-04-17 RX ADMIN — ALTEPLASE 2 MG: 2.2 INJECTION, POWDER, LYOPHILIZED, FOR SOLUTION INTRAVENOUS at 16:24

## 2017-04-17 RX ADMIN — PANTOPRAZOLE SODIUM 40 MG: 40 INJECTION, POWDER, FOR SOLUTION INTRAVENOUS at 08:37

## 2017-04-17 RX ADMIN — OXYCODONE HYDROCHLORIDE 10 MG: 5 TABLET ORAL at 05:22

## 2017-04-17 RX ADMIN — POTASSIUM CHLORIDE 20 MEQ: 29.8 INJECTION, SOLUTION INTRAVENOUS at 18:06

## 2017-04-17 RX ADMIN — QUETIAPINE FUMARATE 25 MG: 25 TABLET, FILM COATED ORAL at 22:00

## 2017-04-17 RX ADMIN — ISOSORBIDE DINITRATE 10 MG: 10 TABLET ORAL at 16:49

## 2017-04-17 RX ADMIN — ATORVASTATIN CALCIUM 40 MG: 40 TABLET, FILM COATED ORAL at 20:00

## 2017-04-17 RX ADMIN — HYDRALAZINE HYDROCHLORIDE 50 MG: 50 TABLET ORAL at 16:49

## 2017-04-17 RX ADMIN — ASPIRIN 81 MG CHEWABLE TABLET 81 MG: 81 TABLET CHEWABLE at 08:37

## 2017-04-17 RX ADMIN — MILRINONE LACTATE 0.38 MCG/KG/MIN: 200 INJECTION, SOLUTION INTRAVENOUS at 08:59

## 2017-04-17 RX ADMIN — CEFTRIAXONE 1 G: 1 INJECTION, POWDER, FOR SOLUTION INTRAMUSCULAR; INTRAVENOUS at 14:09

## 2017-04-17 RX ADMIN — HYDROMORPHONE HYDROCHLORIDE 0.5 MG: 1 INJECTION, SOLUTION INTRAMUSCULAR; INTRAVENOUS; SUBCUTANEOUS at 02:29

## 2017-04-17 RX ADMIN — MULTIVIT AND MINERALS-FERROUS GLUCONATE 9 MG IRON/15 ML ORAL LIQUID 15 ML: at 08:37

## 2017-04-17 RX ADMIN — HYDRALAZINE HYDROCHLORIDE 50 MG: 50 TABLET ORAL at 05:22

## 2017-04-17 RX ADMIN — BUMETANIDE 2 MG: 0.25 INJECTION INTRAMUSCULAR; INTRAVENOUS at 19:01

## 2017-04-17 RX ADMIN — FENTANYL CITRATE 25 MCG: 50 INJECTION, SOLUTION INTRAMUSCULAR; INTRAVENOUS at 14:04

## 2017-04-17 RX ADMIN — FENTANYL CITRATE 25 MCG: 50 INJECTION INTRAMUSCULAR; INTRAVENOUS at 14:04

## 2017-04-17 ASSESSMENT — PAIN DESCRIPTION - DESCRIPTORS
DESCRIPTORS: ACHING
DESCRIPTORS: ACHING

## 2017-04-17 NOTE — PROGRESS NOTES
"[facility] [place]  Cardiology II Service / Advanced Heart Failure        Interval History:   - Hemodynamically stable, remains on Milrinone 0.375 and IABP 1:1 (off heparin inf)  - BMs seem to have been clear/brown over the last 24 hours or so with Golytely  - PCWP 20 this AM            Intake/Output Summary (Last 24 hours) at 04/17/17 1244  Last data filed at 04/17/17 1100   Gross per 24 hour   Intake           2071.5 ml   Output             5022 ml   Net          -2950.5 ml           Pertinent Medications:  I have reviewed this patient's current medications      isosorbide dinitrate  10 mg Oral TID AC     atorvastatin  40 mg Oral or Feeding Tube Daily at 8 pm     cefTRIAXone  1 g Intravenous Q24H     hydrALAZINE  50 mg Oral Q6H     QUEtiapine  25 mg Oral At Bedtime     pantoprazole  40 mg Intravenous BID     pneumococcal vaccine  0.5 mL Intramuscular Once     sodium chloride (PF)  3 mL Intracatheter Q8H     multivitamins with minerals  15 mL Per Feeding Tube Daily     aspirin  81 mg Oral or Feeding Tube Daily         Examination:  /59  Temp 98.2  F (36.8  C) (Oral)  Resp 18  Ht 1.575 m (5' 2.01\")  Wt 58.4 kg (128 lb 12 oz)  SpO2 90%  BMI 23.54 kg/m2  GEN: A, cooperative and conversational   NECK: can not assess JVP   RESP: crackles   CV: S1S2S3, holossystolic murmur at the apex  ABD: Soft, nontender to palpation, no HSM, +BS, no guarding  EXT: No peripheral edema, warm/well perfused   NEURO: CN II-XII intact, normal 5/5 strength in all extremitites  SKIN: Normal skin turgor, no rash on limited exam    Data:  CMP  Recent Labs  Lab 04/17/17  0404 04/17/17  0024 04/16/17  2005 04/16/17  1506  04/16/17  0350 04/15/17  1717 04/15/17  0350  04/14/17  0407 04/13/17  2205  04/13/17  0958  04/12/17  0352   *  --   --  139  --  140 138 139  < > 142 140  < >  --   < > 141   POTASSIUM 3.8 3.4 3.4 4.2  < > 3.0* 4.0 4.0  < > 4.0 4.5  < > 3.8  < > 3.7   CHLORIDE 108  --   --  106  " --  106 105 105  < > 108 108  < >  --   < > 102   CO2 30  --   --  25  --  25 25 24  < > 24 22  < >  --   < > 31   ANIONGAP 8  --   --  8  --  9 8 11  < > 10 10  < >  --   < > 7   GLC 99  --   --  109*  --  132* 129* 157*  < > 132* 118*  < >  --   < > 110*   BUN 24  --   --  28  --  32* 35* 39*  < > 36* 32*  < >  --   < > 46*   CR 1.16  --   --  1.30*  --  1.44* 1.45* 1.42*  < > 1.50* 1.39*  < >  --   < > 1.50*   GFRESTIMATED 67  --   --  59*  --  52* 52* 53*  < > 50* 54*  < >  --   < > 50*   GFRESTBLACK 81  --   --  71  --  63 62 64  < > 60* 66  < >  --   < > 60*   ROB 7.0*  --   --  6.9*  --  7.1* 7.3* 7.1*  < > 7.2* 7.4*  < >  --   < > 7.8*   MAG 2.5*  --   --  1.8  --  2.3 2.4* 2.4*  < > 2.3 2.2  < > 2.3  < > 2.6*   PHOS  --   --   --   --   --   --   --  3.0  --   --  2.8  --  3.6  --  3.8   PROTTOTAL 6.5*  --   --   --   --  6.7*  --  6.7*  --  6.7*  --   --   --   < > 6.4*   ALBUMIN 2.2*  --   --   --   --  2.2*  --  2.3*  --  2.3*  --   --   --   < > 2.4*   BILITOTAL 0.6  --   --   --   --  0.9  --  0.8  --  0.7  --   --   --   < > 1.0   ALKPHOS 55  --   --   --   --  62  --  71  --  62  --   --   --   < > 69   AST 11  --   --   --   --  21  --  27  --  29  --   --   --   < > 30   ALT 36  --   --   --   --  40  --  47  --  57  --   --   --   < > 83*   < > = values in this interval not displayed.  CBC    Recent Labs  Lab 04/17/17  0404 04/16/17 2005 04/16/17  1506 04/16/17  0350  04/15/17  1925  04/15/17  0350   WBC 8.7  --   --  8.8  --  9.3  --  11.1*   RBC 2.81*  --   --  2.83*  --  2.40*  --  2.47*   HGB 8.3* 8.2* 8.3* 8.4*  < > 7.2*  < > 7.4*   HCT 27.3*  --   --  27.4*  --  23.8*  --  24.9*   MCV 97  --   --  97  --  99  --  101*   MCH 29.5  --   --  29.7  --  30.0  --  30.0   MCHC 30.4*  --   --  30.7*  --  30.3*  --  29.7*   RDW 18.0*  --   --  17.5*  --  17.6*  --  17.7*   *  --   --  119*  --  128*  --  131*   < > = values in this interval not displayed.  INR    Recent Labs  Lab  04/12/17  0352 04/11/17  0254   INR 1.12 1.07     Arterial Blood Gas    Recent Labs  Lab 04/17/17  1139 04/17/17  0749 04/17/17  0404 04/17/17  0024   O2PER 21 4L 2L 2L       Imaging Studies:  Echo 3/27  The visual ejection fraction is estimated at 30-35%.  There is extensive inferior, inferoseptal, and inferolateral wall akinesis.  Basal/mid lateral wall hypokinesis  There is moderate to severe septal hypokinesis.  Septal wall motion abnormality may reflect pacemaker activation.  There is a catheter/pacemaker lead seen in the right ventricle.  The right ventricle is mildly dilated.  Severely decreased right ventricular systolic function  There is no pericardial effusion.  The rhythm was paced.  Compared to the prior study, the LVEF appears somewhat decreased. Septal wall  motion abnormality larger- may reflect, in part, that patient in sinus tach on  prior study, and ventricular paced now. No hemodynamically significant  valvular abnormalities on 2D or color flow imaging     CT chest/abdomen 3/27   IMPRESSION:   1. Small mediastinal hemorrhage, small bilateral pleural effusions  which may be hemorrhagic. Small intraperitoneal hemorrhage. Minimal  pericardial hemorrhage.  2. Minimal pneumoperitoneum in the left paracolic gutter, of unknown  significance. No pneumatosis as clinically questioned.  3. IABP slightly low in position.  4. Bilateral pulmonary dependent consolidations represent compression  atelectasis, however differentials include aspiration.     Cath 3/26 [facility]  SUMMARY:   --Inferior STEMI w/ late presentation. Culprit dictated by complete  heart block, and cardiogenic shock. Emergent echo in the Cath Lab also  shows significant RV dysfunction.  --Three vessel coronary artery disease with diffuse coronary disease  out to the distal vessels  --Successful POBA of the prox and mid-RCA. Stent was not placed, in  anticipation he may need to be considered for bypass surgery in the  near  future  --Insertion of a temporary pacemaker for bradycardia (AVB) and  hypotension  --Insertion of a IABP  --Upper GI bleed consistent with Kaylin-[place]     [facility] 3/27     CT head 3/28: normal      Echo 3/29  Interpretation Summary  Limited study. Ischemic CM. Moderately (EF 30-35%) reduced left ventricular  function is present. Traced at 32%.  Posterior wall akinesis is present. Lateral wall akinesis is present. Inferior  wall akinesis is present.  Right ventricular function, chamber size, wall motion, and thickness are  normal.  Dilation of the inferior vena cava is present with abnormal respiratory  variation in diameter.     Compared to prior study, RV fxn is improved.     Echo 3/31  Left Ventricle  The Ejection Fraction is estimated at 50-55%. Inferior,posterior,lateral,basal  septal wall akinesis as reported before. No change.     CORONARY ANGIOGRAM 4/1:   1. Both coronary arteries arise from their respective cusps.  2. Right dominant.  3. LM has mild luminal irregularities.   4. LAD supplies the apex along with the RCA (type 2 LAD) and gives rise to septal perforators and a large and branching D1. There are widely patent stents extending from the proximal to mid LAD. The dLAD has mild to moderate disease to 30% stenosis. The D1 is jailed by the LAD stents, but has EBER 3 flow with disease to 30% stenosis.   5. The small ramus is no longer present.  6. LCX is occluded at the ostium.   7. RCA gives rise to PL branches and supplies PDA. There are widely patent stents extending from the proximal to mid RCA. The remainder of the mRCA has disease to 50% stenosis and the dRCA has disease to 10% stenosis. The RPDA has a widely patent stent and the remainder of the artery has mild disease to 10% stenosis. The RPLA has small vessels with diffuse disease including a distal branch occlusion. The RPLA supplies some small collateral branches to the dLCx.      COMPLICATIONS:  1. None      SUMMARY:   1.  Cardiogenic shock following an inferior STEMI.  2. The LCx re-occlusion is not surprising as the recently revascularized LCx  had poor outflow and the revascularized portion did not supply a large territory (limited to no flow was present distal to the stented segment immediately following stenting). Repeat revascularization of the LCx would result in the same outcome of repeat closure and also risk embolizing thrombus from the LCx into the LAD so no treatment of the occluded LCx was performed.   3. Three vessel coronary artery disease with patent LAD and RCA stents and an occluded LCx.     Echo 4/3  Left ventricular size is normal.  The Ejection Fraction is estimated at 35-40%.  Inferior wall akinesis is present.  Lateral wall akinesis is present.  Posterior wall akinesis is present.  Right ventricular function, chamber size, wall motion, and thickness are  normal.  Moderate mitral insufficiency is present.  Moderate tricuspid insufficiency is present.  Right ventricular systolic pressure is 47mmHg above the right atrial pressure.  The inferior vena cava is normal.     Echo 4/5  Moderately (EF 35-40%) reduced left ventricular function is present. Traced at  40%.  Moderate mitral insufficiency is present.  The cause of the mitral insufficiency appears to be restricted posterior  leaflet from posterior MI. No mitral leaflet pathology seen.  Dilation of the inferior vena cava is present with abnormal respiratory  variation in diameter.     CT abd without contrast 4/4  IMPRESSION:   1. No evidence of ischemic bowel, obstruction, or intra-abdominal  infection.  2. Bibasilar patchy pulmonary opacities likely represent combination  of aspiration and atelectasis.  3. Small amount of simple free fluid within the lower abdomen.  4. Small left retroperitoneal hematoma along the iliac vasculature  likely postprocedural.   5. Unchanged size of bilateral pleural effusions, which now consist of  simple fluid.    Assessment and  Plan  51 year old man presented with cardiogenic shock from inferior STEMI s/p RCA aspiration thrombectomy and POBA on 3/26 at [facility] s/p IABP and initiation of Dopamine, also during procedure, CHB s/p temp pacer, emesis/hematmesis and altered mental status s/p intubation transferred here for consideration of mechanical support on 3/27. Had PCI to RCA, LAD and LCx (on ASA and plavix) started on epinephrine in addition to dopamine, post procedure developed distributive shock picture from infection (aspiration pneumonia? Sputum cx growing staph aureus, on vanco and zosyn) vs post-MI SIRS response. Currently in cardiogenic shock with IABP in situ    Card  # Cardiogenic shock 4/3- from underlying 3v CAD-has had high SVR and low CI, complicated by ischemic MR  # Due to inferior wall STEMI. S/P 7 stents to LAD, circ, RCA (angiogram report pending). Now with IABP, temporary pacemaker after 3rd deg heart block in cath lab at [facility] on 3/26   # Small pericardial hemorrhage   - IABP in place  - Hydral 50mg q6H, once his SCr improves will consider starting Nipride for a more stable afterload reduction regimen, and give him an IABP wean trial  - Hold off on isordil for now  - LVAD work up given SCr improving  - Cortisol normal  - Continue Milrinone 0.375  - Plan to keep CVP 9-12 today, net 500 cc to 1 L negative  - GI planning for colonoscopy today  - If there is no barrier to resuming heparin inf after colonoscopy; we will start heparin overnight and plan to wean down IABP 1:3 tomorrow morning with back up sub clavian IABP planned for tomorrow (in case he doesn't tolerate 1:3 IABP wean)  - Will obtain SPEP/UPEP and serum light chains to rule out possible amyloid     # Neuro  - alert, follows commands  - WBC stable   - Seroquel 25 mg         Respiratory  #Intubated for airway protection due to mental status and emesis on 3/26, extubated 4/3  #Probable Aspiration PNA/pneumonitis vs MSSA pneumonia         # ID  - UA 4/15  with cloudy urine, 50 WBCs, many bacteria, no nitrates. He is on Ceftriaxone for that         # Endocrine  T2DM: HA1C 7.5 on admission.  - Insulin gtt per nurising protocol with hypoglycemia precautions.       # GI  - GI planning for colonoscopy today  - Had several maroon BMs since 4/11, Hb overall dropped by a 1.5 grams or so, heparin inf held and GI consulted  - Will monitor Hb  - Protonix 40 IV BID     Minimal pneumoperitoneum, unclear etiology: Seen by General surgery.  - had bloody NG output earlier in hospitalization.  - Conservative Mx for now        # Renal/  Renal function slightly improved- from hypoperfusion from cardiogenic shock 4/5 plus central venous congestion plus contrast nephropathy  -Diuresis  - Daily Cr  - Avoid nephrotoxins as able  - Plan to make 500cc to 1L net negative today     -Hypernatremia  - Resolved. Stopped his D5W, only getting FWF's.    #Heme  -Dropping Hgb. Plan as above      FEN: feeding tube  Code: FULL  PPx: PPI 40mg daily, senna PRN  Dispo: pending stability        Plan of care discussed with Dr. Yue Loving MD  Cardiovascular Disease Fellow  Pager: [phone]

## 2017-04-17 NOTE — PROGRESS NOTES
04/17/17 1600   Visit Information   Visit Made By Staff    Type of Visit Follow-up;Distress   Distress Emotional   Visited Patient;Family   Interventions   Plan of Care Review   With patient/family/proxy   Basic Spiritual Interventions   Assessment of spiritual needs/resources;Prayer;Devotional reading   Provision of Spiritual Resources  Sacred text  (Chanting)   Advanced Assessments/Interventions   Presenting Concerns/Issues Spiritual/Mandaen/emotional support;Self-concept disturbance;Challenged coping;Stress/self-care   Healing Modalities Provided Singing/Music  (Evangelical Chanting)   SPIRITUAL HEALTH SERVICES   Spiritual Assessment Progress Note   [facility] ([place]) 4E   REFERRAL SOURCE: Request from pt.   On this visit, I chanted the Heart Sutra for Luke.  His son and granddaughter from [place] were also present.   EXPERIENCE OF ILLNESS/HOSPITALIZATION: Luke said he feels so very weak.   MEANING-MAKING: n/d   SPIRITUALITY/VALUES/Congregational: Asked for chanting for his well-being.   COPING/SPIRITUAL PRACTICES: Evangelical chanting.   SUPPORT SYSTEMS: His children; he has four.  Three out in [place] and one in [place]. They take turns coming to see him.   PLAN: Will see Luke on Thursday on 4E.    Rev. Patsy Lowe  Staff   Spiritual Health Services  Pager: [phone]  Office: [phone]

## 2017-04-17 NOTE — PLAN OF CARE
Problem: Discharge Planning  Goal: Discharge Planning (Adult, OB, Behavioral, Peds)  Outcome: No Change  Patient felt better this am after1 unit of PRBC. SVo2 54 and 55 today, last CVP was 14. CVP port does not work this pm, does not draw back and does not flush. Patient had maroon stools during night and brown stools times 12 today, GI saw patient and they will scope tomorrow, golytely prep started. Rectal tube with balloon placed at 1900 for liquid stools, Sore on coccyx, would cleanser used, wound oc nurse consulted, will hopefully see tomorrow, kept patient on side all day. Patient alert and oriented, makes needs known.     Isi Martel

## 2017-04-17 NOTE — PROGRESS NOTES
Calorie Counts    Intake Recorded For: 4/16 Kcals: 170 Protein: 9g    # Meals Recorded: 1 meal (100% chicken noodle soup, applesauce)    # Supplements Recorded: 0

## 2017-04-17 NOTE — PROGRESS NOTES
Social Work Services Progress Note    Hospital Day: 22  Date of Initial Social Work Evaluation:  3/28/17  Collaborated with:  Patient, pt's dtr Noah, pt's son, wife Nathaniel, , Nursing, team rounds, chart review    Data:    Pt remains in ICU with plan to get a colonoscopy today.    Intervention:    Met with pt and his family including wife Nathaniel, son Pancho, and dtr Noah along with Turkish .  Provided education re: HCD short form and confirmed pt's understanding of the form.  Pt clearly states in English that he wants medical decision-making to fall to his wife and four children equally and that they all need to be in agreement.      Pt completed HCD document, which was notarized today, naming the following who all must be in agreement:    Daughter Noah Smiley ([phone])    Wife Nathaniel Plummer ([phone])    Son Pancho Henao ([phone])    Son Vikki Henao ([phone])    Daughter Mony Henao ([phone])    Faxed down to HIM for scanning, placed copy in paper chart, and ensured that family had copies.    Assessment:   Pt and family appear to be adjusting appropriately and voice appreciation of assistance.    Plan:    Anticipated Disposition:  Likely TCU, but TBD pending goals and medical course.    Barriers to d/c plan:  Medical readiness, LVAD w/u    Follow Up:  SW continues to follow for support to pt and family, resource referral, and d/c planning.    LUIS Villafana, [facility]  Adult ICU Clinical   Pager [phone]

## 2017-04-17 NOTE — PLAN OF CARE
Problem: Goal Outcome Summary  Goal: Goal Outcome Summary  OT 4E: Session limited by decreased participation. Pt more lethargic and fatigued during today's session. Pt completed UE exercises x4-5 reps. Therapist provided family with HEP to complete independently. Pt and family verbalized competence. Will reschedule per POC.

## 2017-04-17 NOTE — PLAN OF CARE
Problem: Individualization  Goal: Patient Preferences  D/A/I: Patient alert and oriented. 2 L NC on at night. Sinus tach. IABP 1:1, trigger source ECG. Rectal tube in place, 2000 mL out during shift. UOP 45-75 cc/hr.   P: Colonoscopy in the am, Subclavian IABP placement in the afternoon. Will continue to monitor and notify MD of any changes.

## 2017-04-17 NOTE — PROGRESS NOTES
[facility]  WO Nurse Inpatient Adult Pressure INJURY (PI) Wound Assessment     Initial assessment of PU(s) on pt's:   Coccyx    Data:   Patient History:      per MD note(s): 51 year old man presented with cardiogenic shock from inferior STEMI s/p RCA aspiration thrombectomy and POBA on 3/26 at [facility] s/p IABP and initiation of Dopamine, also during procedure, CHB s/p temp pacer, emesis/hematmesis and altered mental status s/p intubation transferred here for consideration of mechanical support on 3/27. Had PCI to RCA, LAD and LCx (on ASA and plavix) started on epinephrine in addition to dopamine, post procedure developed distributive shock picture from infection (aspiration pneumonia? Sputum cx growing staph aureus, on vanco and zosyn) vs post-MI SIRS response. Currently in cardiogenic shock with IABP in situ      Current mattress:  AtmosAir  Current pressure relieving devices:  Heel lift boots and Pillows    Moisture Management:  Incontinence Protocol, Rectal Tube and Urinary Catheter    Catheter secured? Yes    Current Diet / Nutrition:           Active Diet Order      NPO Time Specified    Tube Feeding:     John Assessment and sub scores:   John Score  Av.1  Min: 12  Max: 18   Labs:   Recent Labs   Lab Test  17   0404   17   0352   17   0425   ALBUMIN  2.2*   < >  2.4*   < >  2.4*   HGB  8.3*   < >  8.1*   < >  9.5*   INR   --    --   1.12   < >  1.19*   WBC  8.7   < >  11.6*   < >  16.7*   A1C   --    --    --    --   7.1*   CRP   --    --    --    --   18.0*    < > = values in this interval not displayed.                                                                                                                          Pressure Injury Assessment  (location #1):   Coccyx    Wound History:   Stage 2 pressure injury situated more toward right buttock    Wound Base: pink dermis no surrounding erythema , moist    Specific Dimensions (length x width x depth, in cm) :   2  x 1 x < 0.1 cm    Palpation of the wound bed:  normal    Slough appearance:  none    Periwound Skin: intact,      Color: normal and consistent with surrounding tissue    Temperature  normal     Drainage:    Amount: scant,  Color: serous       Odor: none    Pain:  minimal , sharp         Intervention:     Patient's chart evaluated.      John Interventions:  Current John Interventions and Care Plan reviewed and updated, appropriate at this time.    Wound was assessed.    Wound Care: was done: Removal of existing dressing    Visual inspection    Cleansing with NS solution    Application of clean dressing,    Orders  Written    Supplies  N/A    Discussed plan of care with Patient, Family and Nurse           Assessment:     Pressure Injury (PI) located on Coccyx: II    Status: wound  initial assessment, Symptomatic    Suspect fecal incontinence and moisture are contributing factors         Plan:     Nursing to notify the Provider(s) and re-consult the WOC Nurse if wound(s) deteriorate(s).    Plan of care for wound located on Coccyx: Remove dressing, Start  Coloplast Triad Barrier, apply 3 times daily, gently pat area clean    WOC Nurse will return: Mondays and Thursday  Face to face time: 15 minutes

## 2017-04-17 NOTE — PLAN OF CARE
Problem: Goal Outcome Summary  Goal: Goal Outcome Summary  4E PT - continue to hold as pt has balloon pump in groin, plan to switch to subclavian soon.  Plus pt is lethargic (may be related to given go-lytely before bed for colonoscopy).  OT is following and will involve PT when appropriate.

## 2017-04-17 NOTE — BRIEF OP NOTE
Patient Name: Luke Henao                Procedure Date: 4/17/2017 12:51 PM   MRN: [id number]                       Account Number: [id number]   YOB: 1965              Admit Type: Inpatient   Age: 51                                Gender: Male   Note Status: Finalized                Attending MD: Rashaad Ken MD   Pause for the Cause: Completed        Total Sedation Time: 23 minutes of    continuous 1:1 bedside monitoring   _______________________________________________________________________________       Procedure:           Colonoscopy   Indications:         Hematochezia     Patient Profile:     52 yo male with history of DM presenting with a STEMI                        c/b cardiogenic shock on a balloon pump, with course c/b                        hematochezia while on anticoagulation.             Providers:           Rashaad Ken MD, Mariangel Okeefe RN, Sindhu Morales MD   Requesting Provider: Magdalena Turner   Medicines:           Midazolam 0.5 mg IV, Fentanyl 25 micrograms IV   Complications:       No immediate complications.   _______________________________________________________________________________                                                                                     Findings:        The digital rectal exam was abnormal. Findings include rectal tenderness.        A deep single (solitary) 8 mm ulcer with a pigmented spot was found in        the distal rectum, consistent with rectal tube ulceration.        Mild nonbleeding ulcerated mucosa was present in the rectum consistent        with rectal tube ulceration.        A sessile polyp was found in the distal sigmoid colon. The polyp was 5        mm in size. Not removed.        A sessile polyp was found in the transverse colon. The polyp was 4 mm in        size. Not removed.        A pedunculated polyp was found in the cecum. The polyp was 5 mm in size.        Not removed.        The  terminal ileum appeared normal.        The exam was otherwise without abnormality.                                                                                     Impression:          - Rectal ulceration from rectal tube with stigmata of                        recent bleeding. This explains his hematochezia in the                        setting of therapeutic anticoagulation. No target found                        that would requiere or would be amenable to endoscopic                        treatment at this time.                        - One 5 mm polyp in the distal sigmoid colon, one 4 mm                        polyp in the transverse colon, and one 5 mm polyp in the                        cecum. Not removed in the setting of recent bleeding and                        anticoagulation.     See full endoscopic/operative note under Chart Review, Procedures tab for details and pictures.    Rashaad Ken MD    [facility] - Department of Medicine  Division of Gastroenterology

## 2017-04-18 ENCOUNTER — APPOINTMENT (OUTPATIENT)
Dept: OCCUPATIONAL THERAPY | Facility: CLINIC | Age: 52
DRG: 228 | End: 2017-04-18
Attending: INTERNAL MEDICINE
Payer: COMMERCIAL

## 2017-04-18 ENCOUNTER — ANESTHESIA EVENT (OUTPATIENT)
Dept: SURGERY | Facility: CLINIC | Age: 52
DRG: 228 | End: 2017-04-18
Payer: COMMERCIAL

## 2017-04-18 ENCOUNTER — ANESTHESIA (OUTPATIENT)
Dept: SURGERY | Facility: CLINIC | Age: 52
DRG: 228 | End: 2017-04-18
Payer: COMMERCIAL

## 2017-04-18 ENCOUNTER — APPOINTMENT (OUTPATIENT)
Dept: GENERAL RADIOLOGY | Facility: CLINIC | Age: 52
DRG: 228 | End: 2017-04-18
Attending: INTERNAL MEDICINE
Payer: COMMERCIAL

## 2017-04-18 ENCOUNTER — OFFICE VISIT (OUTPATIENT)
Dept: INTERPRETER SERVICES | Facility: CLINIC | Age: 52
End: 2017-04-18

## 2017-04-18 LAB
ALBUMIN MFR UR ELPH: 25.9 %
ALBUMIN SERPL ELPH-MCNC: 2.8 G/DL (ref 3.7–5.1)
ALBUMIN SERPL-MCNC: 2.1 G/DL (ref 3.4–5)
ALP SERPL-CCNC: 64 U/L (ref 40–150)
ALPHA1 GLOB MFR UR ELPH: 19.6 %
ALPHA1 GLOB SERPL ELPH-MCNC: 0.4 G/DL (ref 0.2–0.4)
ALPHA2 GLOB MFR UR ELPH: 7 %
ALPHA2 GLOB SERPL ELPH-MCNC: 0.5 G/DL (ref 0.5–0.9)
ALT SERPL W P-5'-P-CCNC: 38 U/L (ref 0–70)
ANION GAP SERPL CALCULATED.3IONS-SCNC: 10 MMOL/L (ref 3–14)
ANION GAP SERPL CALCULATED.3IONS-SCNC: 5 MMOL/L (ref 3–14)
AST SERPL W P-5'-P-CCNC: 20 U/L (ref 0–45)
B-GLOBULIN MFR UR ELPH: 14.3 %
B-GLOBULIN SERPL ELPH-MCNC: 0.9 G/DL (ref 0.6–1)
BACTERIA SPEC CULT: NO GROWTH
BACTERIA SPEC CULT: NO GROWTH
BASE EXCESS BLDA CALC-SCNC: 3.3 MMOL/L
BASE EXCESS BLDV CALC-SCNC: 0.4 MMOL/L
BASE EXCESS BLDV CALC-SCNC: 2.3 MMOL/L
BASE EXCESS BLDV CALC-SCNC: 2.4 MMOL/L
BASE EXCESS BLDV CALC-SCNC: 3.6 MMOL/L
BASE EXCESS BLDV CALC-SCNC: 3.9 MMOL/L
BASE EXCESS BLDV CALC-SCNC: 5.3 MMOL/L
BILIRUB DIRECT SERPL-MCNC: 0.2 MG/DL (ref 0–0.2)
BILIRUB SERPL-MCNC: 0.6 MG/DL (ref 0.2–1.3)
BUN SERPL-MCNC: 23 MG/DL (ref 7–30)
BUN SERPL-MCNC: 24 MG/DL (ref 7–30)
CALCIUM SERPL-MCNC: 6.9 MG/DL (ref 8.5–10.1)
CALCIUM SERPL-MCNC: 7.5 MG/DL (ref 8.5–10.1)
CHLORIDE SERPL-SCNC: 106 MMOL/L (ref 94–109)
CHLORIDE SERPL-SCNC: 107 MMOL/L (ref 94–109)
CO2 SERPL-SCNC: 26 MMOL/L (ref 20–32)
CO2 SERPL-SCNC: 29 MMOL/L (ref 20–32)
CREAT SERPL-MCNC: 1.04 MG/DL (ref 0.66–1.25)
CREAT SERPL-MCNC: 1.04 MG/DL (ref 0.66–1.25)
ERYTHROCYTE [DISTWIDTH] IN BLOOD BY AUTOMATED COUNT: 18 % (ref 10–15)
GAMMA GLOB MFR UR ELPH: 33.2 %
GAMMA GLOB SERPL ELPH-MCNC: 1.6 G/DL (ref 0.7–1.6)
GFR SERPL CREATININE-BSD FRML MDRD: 76 ML/MIN/1.7M2
GFR SERPL CREATININE-BSD FRML MDRD: 76 ML/MIN/1.7M2
GLUCOSE BLDC GLUCOMTR-MCNC: 107 MG/DL (ref 70–99)
GLUCOSE BLDC GLUCOMTR-MCNC: 119 MG/DL (ref 70–99)
GLUCOSE BLDC GLUCOMTR-MCNC: 125 MG/DL (ref 70–99)
GLUCOSE BLDC GLUCOMTR-MCNC: 131 MG/DL (ref 70–99)
GLUCOSE BLDC GLUCOMTR-MCNC: 141 MG/DL (ref 70–99)
GLUCOSE BLDC GLUCOMTR-MCNC: 143 MG/DL (ref 70–99)
GLUCOSE BLDC GLUCOMTR-MCNC: 153 MG/DL (ref 70–99)
GLUCOSE BLDC GLUCOMTR-MCNC: 160 MG/DL (ref 70–99)
GLUCOSE BLDC GLUCOMTR-MCNC: 162 MG/DL (ref 70–99)
GLUCOSE BLDC GLUCOMTR-MCNC: 168 MG/DL (ref 70–99)
GLUCOSE BLDC GLUCOMTR-MCNC: 173 MG/DL (ref 70–99)
GLUCOSE BLDC GLUCOMTR-MCNC: 191 MG/DL (ref 70–99)
GLUCOSE BLDC GLUCOMTR-MCNC: 192 MG/DL (ref 70–99)
GLUCOSE BLDC GLUCOMTR-MCNC: 208 MG/DL (ref 70–99)
GLUCOSE BLDC GLUCOMTR-MCNC: 262 MG/DL (ref 70–99)
GLUCOSE BLDC GLUCOMTR-MCNC: 81 MG/DL (ref 70–99)
GLUCOSE BLDC GLUCOMTR-MCNC: 92 MG/DL (ref 70–99)
GLUCOSE BLDC GLUCOMTR-MCNC: 96 MG/DL (ref 70–99)
GLUCOSE SERPL-MCNC: 141 MG/DL (ref 70–99)
GLUCOSE SERPL-MCNC: 150 MG/DL (ref 70–99)
HCO3 BLD-SCNC: 27 MMOL/L (ref 21–28)
HCO3 BLDV-SCNC: 25 MMOL/L (ref 21–28)
HCO3 BLDV-SCNC: 26 MMOL/L (ref 21–28)
HCO3 BLDV-SCNC: 26 MMOL/L (ref 21–28)
HCO3 BLDV-SCNC: 28 MMOL/L (ref 21–28)
HCO3 BLDV-SCNC: 28 MMOL/L (ref 21–28)
HCO3 BLDV-SCNC: 29 MMOL/L (ref 21–28)
HCT VFR BLD AUTO: 26.4 % (ref 40–53)
HGB BLD-MCNC: 8 G/DL (ref 13.3–17.7)
HGB BLD-MCNC: 8.4 G/DL (ref 13.3–17.7)
KAPPA LC UR-MCNC: 6 MG/DL (ref 0.33–1.94)
KAPPA LC/LAMBDA SER: 1.18 {RATIO} (ref 0.26–1.65)
LACTATE BLD-SCNC: 0.9 MMOL/L (ref 0.7–2.1)
LAMBDA LC SERPL-MCNC: 5.1 MG/DL (ref 0.57–2.63)
LMWH PPP CHRO-ACNC: 0.11 IU/ML
LMWH PPP CHRO-ACNC: NORMAL IU/ML
M PROTEIN MFR UR ELPH: 0 %
M PROTEIN SERPL ELPH-MCNC: 0.2 G/DL
MAGNESIUM SERPL-MCNC: 2.1 MG/DL (ref 1.6–2.3)
MAGNESIUM SERPL-MCNC: 2.2 MG/DL (ref 1.6–2.3)
MAGNESIUM SERPL-MCNC: 2.2 MG/DL (ref 1.6–2.3)
MCH RBC QN AUTO: 30 PG (ref 26.5–33)
MCHC RBC AUTO-ENTMCNC: 30.3 G/DL (ref 31.5–36.5)
MCV RBC AUTO: 99 FL (ref 78–100)
MICRO REPORT STATUS: NORMAL
MICRO REPORT STATUS: NORMAL
O2/TOTAL GAS SETTING VFR VENT: 21 %
O2/TOTAL GAS SETTING VFR VENT: ABNORMAL %
OXYHGB MFR BLDV: 53 %
OXYHGB MFR BLDV: 54 %
OXYHGB MFR BLDV: 54 %
OXYHGB MFR BLDV: 55 %
OXYHGB MFR BLDV: 56 %
OXYHGB MFR BLDV: 57 %
PCO2 BLD: 37 MM HG (ref 35–45)
PCO2 BLDV: 36 MM HG (ref 40–50)
PCO2 BLDV: 36 MM HG (ref 40–50)
PCO2 BLDV: 38 MM HG (ref 40–50)
PCO2 BLDV: 38 MM HG (ref 40–50)
PCO2 BLDV: 39 MM HG (ref 40–50)
PCO2 BLDV: 39 MM HG (ref 40–50)
PH BLD: 7.48 PH (ref 7.35–7.45)
PH BLDV: 7.44 PH (ref 7.32–7.43)
PH BLDV: 7.46 PH (ref 7.32–7.43)
PH BLDV: 7.46 PH (ref 7.32–7.43)
PH BLDV: 7.47 PH (ref 7.32–7.43)
PH BLDV: 7.47 PH (ref 7.32–7.43)
PH BLDV: 7.48 PH (ref 7.32–7.43)
PHOSPHATE SERPL-MCNC: 2.2 MG/DL (ref 2.5–4.5)
PHOSPHATE SERPL-MCNC: 2.4 MG/DL (ref 2.5–4.5)
PHOSPHATE SERPL-MCNC: 3.9 MG/DL (ref 2.5–4.5)
PLATELET # BLD AUTO: 136 10E9/L (ref 150–450)
PO2 BLD: 63 MM HG (ref 80–105)
PO2 BLDV: 29 MM HG (ref 25–47)
PO2 BLDV: 30 MM HG (ref 25–47)
PO2 BLDV: 31 MM HG (ref 25–47)
PO2 BLDV: 31 MM HG (ref 25–47)
PO2 BLDV: 32 MM HG (ref 25–47)
PO2 BLDV: 33 MM HG (ref 25–47)
POTASSIUM BLD-SCNC: 3.4 MMOL/L (ref 3.4–5.3)
POTASSIUM SERPL-SCNC: 3.8 MMOL/L (ref 3.4–5.3)
POTASSIUM SERPL-SCNC: 4.2 MMOL/L (ref 3.4–5.3)
PROT PATTERN SERPL ELPH-IMP: ABNORMAL
PROT PATTERN UR ELPH-IMP: ABNORMAL
PROT SERPL-MCNC: 6.4 G/DL (ref 6.8–8.8)
RBC # BLD AUTO: 2.67 10E12/L (ref 4.4–5.9)
SODIUM SERPL-SCNC: 140 MMOL/L (ref 133–144)
SODIUM SERPL-SCNC: 142 MMOL/L (ref 133–144)
SPECIMEN SOURCE: NORMAL
SPECIMEN SOURCE: NORMAL
WBC # BLD AUTO: 7.1 10E9/L (ref 4–11)

## 2017-04-18 PROCEDURE — 25000132 ZZH RX MED GY IP 250 OP 250 PS 637: Performed by: INTERNAL MEDICINE

## 2017-04-18 PROCEDURE — 85027 COMPLETE CBC AUTOMATED: CPT | Performed by: INTERNAL MEDICINE

## 2017-04-18 PROCEDURE — 40000275 ZZH STATISTIC RCP TIME EA 10 MIN

## 2017-04-18 PROCEDURE — 25000128 H RX IP 250 OP 636: Performed by: STUDENT IN AN ORGANIZED HEALTH CARE EDUCATION/TRAINING PROGRAM

## 2017-04-18 PROCEDURE — 85520 HEPARIN ASSAY: CPT | Performed by: INTERNAL MEDICINE

## 2017-04-18 PROCEDURE — 82784 ASSAY IGA/IGD/IGG/IGM EACH: CPT

## 2017-04-18 PROCEDURE — 25000125 ZZHC RX 250

## 2017-04-18 PROCEDURE — 25000132 ZZH RX MED GY IP 250 OP 250 PS 637

## 2017-04-18 PROCEDURE — T1013 SIGN LANG/ORAL INTERPRETER: HCPCS | Mod: U3

## 2017-04-18 PROCEDURE — 00000146 ZZHCL STATISTIC GLUCOSE BY METER IP

## 2017-04-18 PROCEDURE — 40000133 ZZH STATISTIC OT WARD VISIT

## 2017-04-18 PROCEDURE — 80076 HEPATIC FUNCTION PANEL: CPT | Performed by: INTERNAL MEDICINE

## 2017-04-18 PROCEDURE — 25000132 ZZH RX MED GY IP 250 OP 250 PS 637: Performed by: STUDENT IN AN ORGANIZED HEALTH CARE EDUCATION/TRAINING PROGRAM

## 2017-04-18 PROCEDURE — 83735 ASSAY OF MAGNESIUM: CPT

## 2017-04-18 PROCEDURE — 25000128 H RX IP 250 OP 636

## 2017-04-18 PROCEDURE — 84132 ASSAY OF SERUM POTASSIUM: CPT | Performed by: INTERNAL MEDICINE

## 2017-04-18 PROCEDURE — 85520 HEPARIN ASSAY: CPT

## 2017-04-18 PROCEDURE — 71010 XR CHEST PORT 1 VW: CPT

## 2017-04-18 PROCEDURE — 82803 BLOOD GASES ANY COMBINATION: CPT | Performed by: STUDENT IN AN ORGANIZED HEALTH CARE EDUCATION/TRAINING PROGRAM

## 2017-04-18 PROCEDURE — 20000004 ZZH R&B ICU UMMC

## 2017-04-18 PROCEDURE — 27210437 ZZH NUTRITION PRODUCT SEMIELEM INTERMED LITER

## 2017-04-18 PROCEDURE — 25000125 ZZHC RX 250: Performed by: INTERNAL MEDICINE

## 2017-04-18 PROCEDURE — 84100 ASSAY OF PHOSPHORUS: CPT

## 2017-04-18 PROCEDURE — S0171 BUMETANIDE 0.5 MG: HCPCS | Performed by: INTERNAL MEDICINE

## 2017-04-18 PROCEDURE — 84132 ASSAY OF SERUM POTASSIUM: CPT

## 2017-04-18 PROCEDURE — 82805 BLOOD GASES W/O2 SATURATION: CPT | Performed by: INTERNAL MEDICINE

## 2017-04-18 PROCEDURE — 83735 ASSAY OF MAGNESIUM: CPT | Performed by: INTERNAL MEDICINE

## 2017-04-18 PROCEDURE — 83605 ASSAY OF LACTIC ACID: CPT | Performed by: INTERNAL MEDICINE

## 2017-04-18 PROCEDURE — 85018 HEMOGLOBIN: CPT | Performed by: STUDENT IN AN ORGANIZED HEALTH CARE EDUCATION/TRAINING PROGRAM

## 2017-04-18 PROCEDURE — S0171 BUMETANIDE 0.5 MG: HCPCS

## 2017-04-18 PROCEDURE — S0171 BUMETANIDE 0.5 MG: HCPCS | Performed by: STUDENT IN AN ORGANIZED HEALTH CARE EDUCATION/TRAINING PROGRAM

## 2017-04-18 PROCEDURE — 80048 BASIC METABOLIC PNL TOTAL CA: CPT | Performed by: INTERNAL MEDICINE

## 2017-04-18 PROCEDURE — 25000125 ZZHC RX 250: Performed by: STUDENT IN AN ORGANIZED HEALTH CARE EDUCATION/TRAINING PROGRAM

## 2017-04-18 PROCEDURE — 86334 IMMUNOFIX E-PHORESIS SERUM: CPT

## 2017-04-18 PROCEDURE — 84100 ASSAY OF PHOSPHORUS: CPT | Performed by: INTERNAL MEDICINE

## 2017-04-18 PROCEDURE — 97110 THERAPEUTIC EXERCISES: CPT | Mod: GO

## 2017-04-18 PROCEDURE — 25000128 H RX IP 250 OP 636: Performed by: PEDIATRICS

## 2017-04-18 PROCEDURE — 99291 CRITICAL CARE FIRST HOUR: CPT | Mod: GC | Performed by: INTERNAL MEDICINE

## 2017-04-18 RX ORDER — BUMETANIDE 0.25 MG/ML
2 INJECTION INTRAMUSCULAR; INTRAVENOUS ONCE
Status: COMPLETED | OUTPATIENT
Start: 2017-04-18 | End: 2017-04-18

## 2017-04-18 RX ORDER — SODIUM CHLORIDE 9 MG/ML
INJECTION, SOLUTION INTRAVENOUS
Status: COMPLETED
Start: 2017-04-18 | End: 2017-04-18

## 2017-04-18 RX ADMIN — CEFTRIAXONE 1 G: 1 INJECTION, POWDER, FOR SOLUTION INTRAMUSCULAR; INTRAVENOUS at 14:28

## 2017-04-18 RX ADMIN — ATORVASTATIN CALCIUM 40 MG: 40 TABLET, FILM COATED ORAL at 20:37

## 2017-04-18 RX ADMIN — SODIUM CHLORIDE: 0.9 INJECTION, SOLUTION INTRAVENOUS at 14:52

## 2017-04-18 RX ADMIN — BUMETANIDE 2 MG: 0.25 INJECTION, SOLUTION INTRAMUSCULAR; INTRAVENOUS at 11:38

## 2017-04-18 RX ADMIN — PANTOPRAZOLE SODIUM 40 MG: 40 TABLET, DELAYED RELEASE ORAL at 20:37

## 2017-04-18 RX ADMIN — HYDRALAZINE HYDROCHLORIDE 50 MG: 50 TABLET ORAL at 05:10

## 2017-04-18 RX ADMIN — QUETIAPINE FUMARATE 25 MG: 25 TABLET, FILM COATED ORAL at 22:03

## 2017-04-18 RX ADMIN — MULTIVIT AND MINERALS-FERROUS GLUCONATE 9 MG IRON/15 ML ORAL LIQUID 15 ML: at 11:06

## 2017-04-18 RX ADMIN — HYDRALAZINE HYDROCHLORIDE 50 MG: 50 TABLET ORAL at 00:35

## 2017-04-18 RX ADMIN — PANTOPRAZOLE SODIUM 40 MG: 40 TABLET, DELAYED RELEASE ORAL at 11:06

## 2017-04-18 RX ADMIN — HYDRALAZINE HYDROCHLORIDE 50 MG: 50 TABLET ORAL at 23:11

## 2017-04-18 RX ADMIN — BUMETANIDE 2 MG: 0.25 INJECTION INTRAMUSCULAR; INTRAVENOUS at 00:30

## 2017-04-18 RX ADMIN — HUMAN INSULIN 1 UNITS/HR: 100 INJECTION, SOLUTION SUBCUTANEOUS at 09:51

## 2017-04-18 RX ADMIN — HEPARIN SODIUM 900 UNITS/HR: 10000 INJECTION, SOLUTION INTRAVENOUS at 18:50

## 2017-04-18 RX ADMIN — ASPIRIN 81 MG CHEWABLE TABLET 81 MG: 81 TABLET CHEWABLE at 11:06

## 2017-04-18 RX ADMIN — SODIUM CHLORIDE 500 ML: 9 INJECTION, SOLUTION INTRAVENOUS at 12:06

## 2017-04-18 RX ADMIN — BUMETANIDE 2 MG: 0.25 INJECTION INTRAMUSCULAR; INTRAVENOUS at 21:30

## 2017-04-18 RX ADMIN — MILRINONE LACTATE 0.38 MCG/KG/MIN: 200 INJECTION, SOLUTION INTRAVENOUS at 21:09

## 2017-04-18 RX ADMIN — Medication 7 ML: at 20:37

## 2017-04-18 RX ADMIN — POTASSIUM CHLORIDE 20 MEQ: 29.8 INJECTION, SOLUTION INTRAVENOUS at 12:06

## 2017-04-18 NOTE — PROGRESS NOTES
CLINICAL NUTRITION SERVICES - REASSESSMENT NOTE     Nutrition Prescription    RECOMMENDATIONS FOR MDs/PROVIDERS TO ORDER:  1. Diet adv once able after procedure  2. Free water adjustments per MD/provider discretion pending Na trends     Malnutrition Status:    Non-severe malnutrition in the context of acute illness    Recommendations already ordered by Registered Dietitian (RD):  1. Increase back to Peptamen 1.5 @ goal 45 ml/hr (1080 ml/day) to provide 1620 kcal (29 kcal/kg + pending PO), 73 g PRO (1.3 g/kg + pending PO), 832 ml free H2O, 60 g Fat (70% from MCTs), 203 g CHO and no Fiber daily per dosing weight of 56 kg.    2. Begin 7 mL BID Tri-Vi-Sol (21,000 units Vitamin A, 5600 units Vitamin D, 490 mg Vitamin C) and 220 mg zinc sulfate x 10 days for stage 2 pressure injury.     3. D/c calorie counts as NPO today.     Future/Additional Recommendations:  1. Once diet adv after procedure, order Ensure plus @ 10 am and re-order calorie counts   2. If PO intake (per initial calorie count data) shows patient meeting at least 50% of estimated need (700 kcal and 35 g PRO), consider cycling TF (Peptamen 1.5) @ 45 ml/hr x 12 hrs (8pm-8am) to provide ~50% of above provisions.  3. If PO intake minimal or poor per calorie counts, consider adding 1 pkt Prostat daily (100 kcal and 15 g PRO) to help ensure adequate PRO for wound healing.      EVALUATION OF THE PROGRESS TOWARD GOALS   Diet: Currently NPO for procedure (previoulsy regular diet 4/17 and 4/15, CLD 4/16 and 4/12, DD2 4/11)    Nutrition Support: Receiving TF (Nepro as of 4/6 given elevated Phos, rate decreased from 45 mL/hr on 4/15 per MD request) @ goal 30 ml/hr via nasoduodenal FT placed by SUZANNA with Olegario on 3/29, secured with Bridle    Intake: Tolerated regular dinner last night per RN note (see below). Average TF + D10% intakes over past 7 days provided 695 kcal (12 kcal/kg) and 28 g PRO (0.5 g/kg) - this met 50% lowest kcal and 38% lowest PRO needs. TF has  frequently been on hold for various procedures and GIB over past week.     Calorie counts:   4/17: no intake recorded (ordered 1 meal of vegetable soup and applesauce, 110 kcal and 2 g PRO if ate 100%)  4/16: 170 kcal, 9 g PRO ( 1 meal)     NEW FINDINGS   -CV: LVAD work up. IABP replaced 4/13, possible subclavian IABP placement today.   -GI: Colonoscopy yesterday - Rectal ulceration from rectal tube, polyps in colon. 2L stool yesterday d/t Golytley prep.   -Weight: 58.4 kg yesterday. Lowest wt remains 56.2 kg on 4/11.   -Skin: Per WOC on 4/17, stage 2 PI on coccyx. On multivitamin with minerals daily for micronutrient supplementation.   -Labs/Meds: Phos low @ 2.2 yesterday, 2.4 today.    Updated ASSESSED NUTRITION NEEDS (dosing weight of 56 kg)  Estimated Protein Needs: 73-84+ grams protein/day (1.3- 1.5+ grams of pro/kg)  Justification: Hypercatabolism with critical illness, wound healing    MALNUTRITION  % Intake: <75% for >7 days   % Weight Loss: None noted this week  Subcutaneous Fat Loss: Upper arm, lower arm: Mild  Muscle Loss: Upper arm, lower arm: mild  Fluid Accumulation/Edema: Trace  Malnutrition Diagnosis: Non-severe malnutrition in the context of acute illness    Previous Goals   Total avg nutritional intake to meet a minimum of 25 kcal/kg and 1.2 g PRO/kg daily (per dosing wt 56 kg).  Evaluation: not met    Previous Nutrition Diagnosis  Predicted inadequate nutrient intake (protein-energy) related to reliant on TF for majority of nutrition given limited diet order with IABP in place.      Evaluation: declining    CURRENT NUTRITION DIAGNOSIS  Inadequate protein-energy intake related to inadequate EN infusion and PO intake 2/2 frequent NPO status/procedures AEB TF only provided 50% lowest kcal and 38% lowest PRO needs on average over 7 days with documented minimal PO kcals/PRO (~110-170 kcal and 2-9 g PRO per day), now with stage 2 PI.     INTERVENTIONS  Implementation  Collaboration with other providers  - Discussed increaes in rate back to 45 mL/hr, starting wound healing micronutrients w/ Cards 2 MD.  Enteral Nutrition - Modify rate  Multivitamin/Mineral supplements - Ordered Tri-Vi-Sol and Zinc supplementation.  D/c calorie counts until diet adv.     Goals  Total avg nutritional intake to meet a minimum of 25 kcal/kg and 1.3 g PRO/kg daily (per dosing wt 56 kg).    Monitoring/Evaluation  Progress toward goals will be monitored and evaluated per protocol.      Paula Guerrero RD, LD  Pager: [phone]

## 2017-04-18 NOTE — PROGRESS NOTES
"[facility]  Cardiology II Service / Advanced Heart Failure        Interval History:   - Overnight had some low MAPs, normal lactate  - Underwent colonoscopy 4/17 that showed a rectal ulcer but no intervenable lesions  - This morning was more somnolent than before  - PCWP 20 on 4/18        Intake/Output Summary (Last 24 hours) at 04/18/17 1429  Last data filed at 04/18/17 1400   Gross per 24 hour   Intake          1643.61 ml   Output             1968 ml   Net          -324.39 ml               Pertinent Medications:  I have reviewed this patient's current medications      NaCl         pantoprazole  40 mg Per Feeding Tube BID     isosorbide dinitrate  10 mg Oral TID AC     atorvastatin  40 mg Oral or Feeding Tube Daily at 8 pm     cefTRIAXone  1 g Intravenous Q24H     hydrALAZINE  50 mg Oral Q6H     QUEtiapine  25 mg Oral At Bedtime     pneumococcal vaccine  0.5 mL Intramuscular Once     sodium chloride (PF)  3 mL Intracatheter Q8H     multivitamins with minerals  15 mL Per Feeding Tube Daily     aspirin  81 mg Oral or Feeding Tube Daily         Examination:  /59  Temp 98.6  F (37  C) (Oral)  Resp 18  Ht 1.575 m (5' 2.01\")  Wt 58.4 kg (128 lb 12 oz)  SpO2 99%  BMI 23.54 kg/m2  GEN: A, cooperative and conversational   NECK: can not assess JVP   RESP: crackles   CV: S1S2S3, holossystolic murmur at the apex  ABD: Soft, nontender to palpation, no HSM, +BS, no guarding  EXT: No peripheral edema, warm/well perfused   NEURO: CN II-XII intact, normal 5/5 strength in all extremitites  SKIN: Normal skin turgor, no rash on limited exam    Data:  CMP  Recent Labs  Lab 04/18/17  1029 04/18/17  0329 04/17/17  2350 04/17/17  1549 04/17/17  0404  04/16/17  1506  04/16/17  0350  04/15/17  0350  04/13/17  2205   NA  --  142  --  143 145*  --  139  --  140  < > 139  < > 140   POTASSIUM 3.8 3.8 3.4  3.8 3.6 3.8  < > 4.2  < > 3.0*  < > 4.0  < > 4.5   CHLORIDE  --  107  --  108 108  --  " 106  --  106  < > 105  < > 108   CO2  --  26  --  26 30  --  25  --  25  < > 24  < > 22   ANIONGAP  --  10  --  8 8  --  8  --  9  < > 11  < > 10   GLC  --  150*  --  104* 99  --  109*  --  132*  < > 157*  < > 118*   BUN  --  23  --  21 24  --  28  --  32*  < > 39*  < > 32*   CR  --  1.04  --  0.99 1.16  --  1.30*  --  1.44*  < > 1.42*  < > 1.39*   GFRESTIMATED  --  76  --  80 67  --  59*  --  52*  < > 53*  < > 54*   GFRESTBLACK  --  >90African American GFR Calc  --  >90African American GFR Calc 81  --  71  --  63  < > 64  < > 66   ROB  --  6.9*  --  7.0* 7.0*  --  6.9*  --  7.1*  < > 7.1*  < > 7.4*   MAG  --  2.1 2.2 2.4* 2.5*  --  1.8  --  2.3  < > 2.4*  < > 2.2   PHOS  --  2.4* 2.2*  --   --   --   --   --   --   --  3.0  --  2.8   PROTTOTAL  --  6.4*  --   --  6.5*  --   --   --  6.7*  --  6.7*  < >  --    ALBUMIN  --  2.1*  --   --  2.2*  --   --   --  2.2*  --  2.3*  < >  --    BILITOTAL  --  0.6  --   --  0.6  --   --   --  0.9  --  0.8  < >  --    ALKPHOS  --  64  --   --  55  --   --   --  62  --  71  < >  --    AST  --  20  --   --  11  --   --   --  21  --  27  < >  --    ALT  --  38  --   --  36  --   --   --  40  --  47  < >  --    < > = values in this interval not displayed.  CBC    Recent Labs  Lab 04/18/17  0329 04/18/17  0040 04/17/17 2002 04/17/17  0404  04/16/17  0350   WBC 7.1  --  9.4 8.7  --  8.8   RBC 2.67*  --  2.98* 2.81*  --  2.83*   HGB 8.0* 8.4* 9.1* 8.3*  < > 8.4*   HCT 26.4*  --  29.2* 27.3*  --  27.4*   MCV 99  --  98 97  --  97   MCH 30.0  --  30.5 29.5  --  29.7   MCHC 30.3*  --  31.2* 30.4*  --  30.7*   RDW 18.0*  --  18.1* 18.0*  --  17.5*   *  --  128* 130*  --  119*   < > = values in this interval not displayed.  INR    Recent Labs  Lab 04/12/17  0352   INR 1.12     Arterial Blood Gas    Recent Labs  Lab 04/18/17  1224 04/18/17  1029 04/18/17  0705 04/18/17  0339   PH  --  7.48*  --   --    PCO2  --  37  --   --    PO2  --  63*  --   --    HCO3  --  27  --   --    O2PER  2L 21% 2L 21.0       Imaging Studies:  Echo 3/27  The visual ejection fraction is estimated at 30-35%.  There is extensive inferior, inferoseptal, and inferolateral wall akinesis.  Basal/mid lateral wall hypokinesis  There is moderate to severe septal hypokinesis.  Septal wall motion abnormality may reflect pacemaker activation.  There is a catheter/pacemaker lead seen in the right ventricle.  The right ventricle is mildly dilated.  Severely decreased right ventricular systolic function  There is no pericardial effusion.  The rhythm was paced.  Compared to the prior study, the LVEF appears somewhat decreased. Septal wall  motion abnormality larger- may reflect, in part, that patient in sinus tach on  prior study, and ventricular paced now. No hemodynamically significant  valvular abnormalities on 2D or color flow imaging     CT chest/abdomen 3/27   IMPRESSION:   1. Small mediastinal hemorrhage, small bilateral pleural effusions  which may be hemorrhagic. Small intraperitoneal hemorrhage. Minimal  pericardial hemorrhage.  2. Minimal pneumoperitoneum in the left paracolic gutter, of unknown  significance. No pneumatosis as clinically questioned.  3. IABP slightly low in position.  4. Bilateral pulmonary dependent consolidations represent compression  atelectasis, however differentials include aspiration.     Cath 3/26 [facility]  SUMMARY:   --Inferior STEMI w/ late presentation. Culprit dictated by complete  heart block, and cardiogenic shock. Emergent echo in the Cath Lab also  shows significant RV dysfunction.  --Three vessel coronary artery disease with diffuse coronary disease  out to the distal vessels  --Successful POBA of the prox and mid-RCA. Stent was not placed, in  anticipation he may need to be considered for bypass surgery in the  near future  --Insertion of a temporary pacemaker for bradycardia (AVB) and  hypotension  --Insertion of a IABP  --Upper GI bleed consistent with Kaylin-Bonilla     Cath  [place] 3/27     CT head 3/28: normal      Echo 3/29  Interpretation Summary  Limited study. Ischemic CM. Moderately (EF 30-35%) reduced left ventricular  function is present. Traced at 32%.  Posterior wall akinesis is present. Lateral wall akinesis is present. Inferior  wall akinesis is present.  Right ventricular function, chamber size, wall motion, and thickness are  normal.  Dilation of the inferior vena cava is present with abnormal respiratory  variation in diameter.     Compared to prior study, RV fxn is improved.     Echo 3/31  Left Ventricle  The Ejection Fraction is estimated at 50-55%. Inferior,posterior,lateral,basal  septal wall akinesis as reported before. No change.     CORONARY ANGIOGRAM 4/1:   1. Both coronary arteries arise from their respective cusps.  2. Right dominant.  3. LM has mild luminal irregularities.   4. LAD supplies the apex along with the RCA (type 2 LAD) and gives rise to septal perforators and a large and branching D1. There are widely patent stents extending from the proximal to mid LAD. The dLAD has mild to moderate disease to 30% stenosis. The D1 is jailed by the LAD stents, but has EBER 3 flow with disease to 30% stenosis.   5. The small ramus is no longer present.  6. LCX is occluded at the ostium.   7. RCA gives rise to PL branches and supplies PDA. There are widely patent stents extending from the proximal to mid RCA. The remainder of the mRCA has disease to 50% stenosis and the dRCA has disease to 10% stenosis. The RPDA has a widely patent stent and the remainder of the artery has mild disease to 10% stenosis. The RPLA has small vessels with diffuse disease including a distal branch occlusion. The RPLA supplies some small collateral branches to the dLCx.      COMPLICATIONS:  1. None      SUMMARY:   1. Cardiogenic shock following an inferior STEMI.  2. The LCx re-occlusion is not surprising as the recently revascularized LCx  had poor outflow and the revascularized  portion did not supply a large territory (limited to no flow was present distal to the stented segment immediately following stenting). Repeat revascularization of the LCx would result in the same outcome of repeat closure and also risk embolizing thrombus from the LCx into the LAD so no treatment of the occluded LCx was performed.   3. Three vessel coronary artery disease with patent LAD and RCA stents and an occluded LCx.     Echo 4/3  Left ventricular size is normal.  The Ejection Fraction is estimated at 35-40%.  Inferior wall akinesis is present.  Lateral wall akinesis is present.  Posterior wall akinesis is present.  Right ventricular function, chamber size, wall motion, and thickness are  normal.  Moderate mitral insufficiency is present.  Moderate tricuspid insufficiency is present.  Right ventricular systolic pressure is 47mmHg above the right atrial pressure.  The inferior vena cava is normal.     Echo 4/5  Moderately (EF 35-40%) reduced left ventricular function is present. Traced at  40%.  Moderate mitral insufficiency is present.  The cause of the mitral insufficiency appears to be restricted posterior  leaflet from posterior MI. No mitral leaflet pathology seen.  Dilation of the inferior vena cava is present with abnormal respiratory  variation in diameter.     CT abd without contrast 4/4  IMPRESSION:   1. No evidence of ischemic bowel, obstruction, or intra-abdominal  infection.  2. Bibasilar patchy pulmonary opacities likely represent combination  of aspiration and atelectasis.  3. Small amount of simple free fluid within the lower abdomen.  4. Small left retroperitoneal hematoma along the iliac vasculature  likely postprocedural.   5. Unchanged size of bilateral pleural effusions, which now consist of  simple fluid.    Assessment and Plan  51 year old man presented with cardiogenic shock from inferior STEMI s/p RCA aspiration thrombectomy and POBA on 3/26 at [facility] s/p IABP and initiation of  Dopamine, also during procedure, CHB s/p temp pacer, emesis/hematmesis and altered mental status s/p intubation transferred here for consideration of mechanical support on 3/27. Had PCI to RCA, LAD and LCx (on ASA and plavix) started on epinephrine in addition to dopamine, post procedure developed distributive shock picture from infection (aspiration pneumonia? Sputum cx growing staph aureus, on vanco and zosyn) vs post-MI SIRS response. Currently in cardiogenic shock with IABP in situ    Card  # Cardiogenic shock 4/3- from underlying 3v CAD-has had high SVR and low CI, complicated by ischemic MR  # Due to inferior wall STEMI. S/P 7 stents to LAD, circ, RCA (angiogram report pending). Now with IABP, temporary pacemaker after 3rd deg heart block in cath lab at [facility] on 3/26   # Small pericardial hemorrhage   - IABP in place  - Hydral 50mg q6H, once his SCr improves will consider starting Nipride for a more stable afterload reduction regimen, and give him an IABP wean trial  - Hold off on isordil for now  - LVAD work up given SCr improving  - Cortisol normal  - Continue Milrinone 0.375  - Plan to keep CVP 9-12 today, net 500 cc to 1 L negative  - Plan for SC IABP today  - Will follow up SPEP, UPEP and serum light chains to rule out amyloid    # Neuro  - alert, follows commands  - WBC stable   - Seroquel 25 mg         Respiratory  #Intubated for airway protection due to mental status and emesis on 3/26, extubated 4/3  #Probable Aspiration PNA/pneumonitis vs MSSA pneumonia         # ID  - UA 4/15 with cloudy urine, 50 WBCs, many bacteria, no nitrates. He is on Ceftriaxone for that         # Endocrine  T2DM: HA1C 7.5 on admission.  - Insulin gtt per nurising protocol with hypoglycemia precautions.       # GI    - Had several maroon BMs since 4/11, Hb overall dropped by a 1.5 grams or so, heparin inf held and GI consulted  - Colonoscopy 4/17 showed a rectal ulcer but no intervenable lesions  - Will continue to monitor  Hb  - Protonix 40 PO BID         # Renal/  Renal function slightly improved- from hypoperfusion from cardiogenic shock 4/5 plus central venous congestion plus contrast nephropathy  -Diuresis  - Daily Cr  - Avoid nephrotoxins as able  - Plan to make 500cc to 1L net negative today     -Hypernatremia  - Resolved. Stopped his D5W, only getting FWF's.    #Heme  -Dropping Hgb. Plan as above      FEN: feeding tube  Code: FULL  PPx: PPI 40mg BID, senna PRN  Dispo: pending stability        Plan of care discussed with Dr. Yue Loving MD  Cardiovascular Disease Fellow  Pager: [phone]

## 2017-04-18 NOTE — PLAN OF CARE
Problem: Goal Outcome Summary  Goal: Goal Outcome Summary  SLP: Dysphagia therapy cancelled.  Pt NPO for procedure. ST to follow as indicated on POC.

## 2017-04-18 NOTE — PLAN OF CARE
Problem: Goal Outcome Summary  Goal: Goal Outcome Summary  OT 4E: Pt more lethargic and fatigued during today's session. Pt with decreased command following, following ~50% of commands with verbal/demonstration needed. Pt perseverating on upcoming surgery and drinking water. Pt continues to be limited by weakness and cognitive impairments. Pt's VSS throughout.      Rec TCU when medically stable due to prolonged bedrest leading to deconditioning.

## 2017-04-18 NOTE — PLAN OF CARE
Problem: Goal Outcome Summary  Goal: Goal Outcome Summary  Outcome: No Change  IABP sheath clotted- Cards 2 team aware. Slaving IABP off monitor. LIJ side port clotted- cath flow given with good results.  Pt confused throughotu the day - Dr. Turner/ team notified and aware. Colonoscopy done at bedside without problems. Plan to start heparin tonight and attempt to wean iabp in AM. Remains on milrinone. Received bumex this evening for elevated cvp/pap- Dr. Banda notified. Tolerated regular dinner this evneing & tube feeds restarted- plan for NPO @ midnight in case for possible subclavian IABP. Potassium replaced. Will continue to monitor and follow plan of care.    Problem: Intra-Aortic Balloon Pump (Adult)  Goal: Signs and Symptoms of Listed Potential Problems Will be Absent or Manageable (Intra-Aortic Balloon Pump)  Signs and symptoms of listed potential problems will be absent or manageable by discharge/transition of care (reference Intra-Aortic Balloon Pump (Adult) CPG).   Outcome: No Change    04/17/17 1934   Intra-Aortic Balloon Pump   Problems Assessed (Intra-Aortic Balloon Pump) all   Problems Present (Intra-Aortic Balloon Pump) bleeding;hemodynamic instability;pain;inability to wean;skin breakdown         Problem: Cardiac: Acute Coronary Syndrome (ACS) (Adult)  Goal: Signs and Symptoms of Listed Potential Problems Will be Absent or Manageable (Cardiac: Acute Coronary Syndrome)  Signs and symptoms of listed potential problems will be absent or manageable by discharge/transition of care (reference Cardiac: Acute Coronary Syndrome (ACS) (Adult) CPG).   Outcome: No Change    04/03/17 0400   Cardiac: Acute Coronary Syndrome (ACS)   Problems Assessed (Acute Coronary Syndrome (ACS)) all   Problems Present (Acute Coronary Syndrome (ACS)) situational response

## 2017-04-19 ENCOUNTER — APPOINTMENT (OUTPATIENT)
Dept: INTERVENTIONAL RADIOLOGY/VASCULAR | Facility: CLINIC | Age: 52
DRG: 228 | End: 2017-04-19
Attending: THORACIC SURGERY (CARDIOTHORACIC VASCULAR SURGERY)
Payer: COMMERCIAL

## 2017-04-19 ENCOUNTER — APPOINTMENT (OUTPATIENT)
Dept: GENERAL RADIOLOGY | Facility: CLINIC | Age: 52
DRG: 228 | End: 2017-04-19
Attending: INTERNAL MEDICINE
Payer: COMMERCIAL

## 2017-04-19 LAB
ABO + RH BLD: NORMAL
ABO + RH BLD: NORMAL
ALBUMIN SERPL-MCNC: 2.2 G/DL (ref 3.4–5)
ALBUMIN UR-MCNC: 10 MG/DL
ALP SERPL-CCNC: 63 U/L (ref 40–150)
ALT SERPL W P-5'-P-CCNC: 51 U/L (ref 0–70)
ANION GAP SERPL CALCULATED.3IONS-SCNC: 8 MMOL/L (ref 3–14)
ANION GAP SERPL CALCULATED.3IONS-SCNC: 9 MMOL/L (ref 3–14)
ANION GAP SERPL CALCULATED.3IONS-SCNC: 9 MMOL/L (ref 3–14)
APPEARANCE UR: ABNORMAL
APTT PPP: 34 SEC (ref 22–37)
AST SERPL W P-5'-P-CCNC: 40 U/L (ref 0–45)
BASE EXCESS BLDA CALC-SCNC: 2 MMOL/L
BASE EXCESS BLDA CALC-SCNC: 3.7 MMOL/L
BASE EXCESS BLDA CALC-SCNC: 5.1 MMOL/L
BASE EXCESS BLDV CALC-SCNC: 2.2 MMOL/L
BASE EXCESS BLDV CALC-SCNC: 4.4 MMOL/L
BASE EXCESS BLDV CALC-SCNC: 4.7 MMOL/L
BASE EXCESS BLDV CALC-SCNC: 5 MMOL/L
BASE EXCESS BLDV CALC-SCNC: 5.6 MMOL/L
BASE EXCESS BLDV CALC-SCNC: 5.7 MMOL/L
BILIRUB DIRECT SERPL-MCNC: 0.2 MG/DL (ref 0–0.2)
BILIRUB SERPL-MCNC: 0.5 MG/DL (ref 0.2–1.3)
BILIRUB UR QL STRIP: NEGATIVE
BLD GP AB SCN SERPL QL: NORMAL
BLD PROD TYP BPU: NORMAL
BLD UNIT ID BPU: 0
BLD UNIT ID BPU: 0
BLOOD BANK CMNT PATIENT-IMP: NORMAL
BLOOD PRODUCT CODE: NORMAL
BLOOD PRODUCT CODE: NORMAL
BPU ID: NORMAL
BPU ID: NORMAL
BUN SERPL-MCNC: 22 MG/DL (ref 7–30)
BUN SERPL-MCNC: 23 MG/DL (ref 7–30)
BUN SERPL-MCNC: 24 MG/DL (ref 7–30)
CA-I BLD-MCNC: 4.3 MG/DL (ref 4.4–5.2)
CA-I BLD-MCNC: 4.4 MG/DL (ref 4.4–5.2)
CA-I BLD-MCNC: 4.4 MG/DL (ref 4.4–5.2)
CALCIUM SERPL-MCNC: 7.3 MG/DL (ref 8.5–10.1)
CALCIUM SERPL-MCNC: 7.5 MG/DL (ref 8.5–10.1)
CALCIUM SERPL-MCNC: 8.2 MG/DL (ref 8.5–10.1)
CHLORIDE SERPL-SCNC: 106 MMOL/L (ref 94–109)
CHLORIDE SERPL-SCNC: 107 MMOL/L (ref 94–109)
CHLORIDE SERPL-SCNC: 108 MMOL/L (ref 94–109)
CO2 SERPL-SCNC: 25 MMOL/L (ref 20–32)
CO2 SERPL-SCNC: 27 MMOL/L (ref 20–32)
CO2 SERPL-SCNC: 28 MMOL/L (ref 20–32)
COLOR UR AUTO: YELLOW
CREAT SERPL-MCNC: 1.02 MG/DL (ref 0.66–1.25)
CREAT SERPL-MCNC: 1.07 MG/DL (ref 0.66–1.25)
CREAT SERPL-MCNC: 1.08 MG/DL (ref 0.66–1.25)
ERYTHROCYTE [DISTWIDTH] IN BLOOD BY AUTOMATED COUNT: 17.6 % (ref 10–15)
ERYTHROCYTE [DISTWIDTH] IN BLOOD BY AUTOMATED COUNT: 17.8 % (ref 10–15)
GFR SERPL CREATININE-BSD FRML MDRD: 72 ML/MIN/1.7M2
GFR SERPL CREATININE-BSD FRML MDRD: 73 ML/MIN/1.7M2
GFR SERPL CREATININE-BSD FRML MDRD: 77 ML/MIN/1.7M2
GLUCOSE BLD-MCNC: 107 MG/DL (ref 70–99)
GLUCOSE BLDC GLUCOMTR-MCNC: 105 MG/DL (ref 70–99)
GLUCOSE BLDC GLUCOMTR-MCNC: 121 MG/DL (ref 70–99)
GLUCOSE BLDC GLUCOMTR-MCNC: 129 MG/DL (ref 70–99)
GLUCOSE BLDC GLUCOMTR-MCNC: 140 MG/DL (ref 70–99)
GLUCOSE BLDC GLUCOMTR-MCNC: 142 MG/DL (ref 70–99)
GLUCOSE BLDC GLUCOMTR-MCNC: 144 MG/DL (ref 70–99)
GLUCOSE BLDC GLUCOMTR-MCNC: 150 MG/DL (ref 70–99)
GLUCOSE BLDC GLUCOMTR-MCNC: 150 MG/DL (ref 70–99)
GLUCOSE BLDC GLUCOMTR-MCNC: 167 MG/DL (ref 70–99)
GLUCOSE BLDC GLUCOMTR-MCNC: 167 MG/DL (ref 70–99)
GLUCOSE BLDC GLUCOMTR-MCNC: 172 MG/DL (ref 70–99)
GLUCOSE BLDC GLUCOMTR-MCNC: 173 MG/DL (ref 70–99)
GLUCOSE BLDC GLUCOMTR-MCNC: 175 MG/DL (ref 70–99)
GLUCOSE BLDC GLUCOMTR-MCNC: 179 MG/DL (ref 70–99)
GLUCOSE BLDC GLUCOMTR-MCNC: 90 MG/DL (ref 70–99)
GLUCOSE BLDC GLUCOMTR-MCNC: 95 MG/DL (ref 70–99)
GLUCOSE SERPL-MCNC: 140 MG/DL (ref 70–99)
GLUCOSE SERPL-MCNC: 167 MG/DL (ref 70–99)
GLUCOSE SERPL-MCNC: 173 MG/DL (ref 70–99)
GLUCOSE UR STRIP-MCNC: 30 MG/DL
HCO3 BLD-SCNC: 26 MMOL/L (ref 21–28)
HCO3 BLD-SCNC: 28 MMOL/L (ref 21–28)
HCO3 BLD-SCNC: 28 MMOL/L (ref 21–28)
HCO3 BLD-SCNC: 29 MMOL/L (ref 21–28)
HCO3 BLDV-SCNC: 27 MMOL/L (ref 21–28)
HCO3 BLDV-SCNC: 28 MMOL/L (ref 21–28)
HCO3 BLDV-SCNC: 29 MMOL/L (ref 21–28)
HCO3 BLDV-SCNC: 29 MMOL/L (ref 21–28)
HCO3 BLDV-SCNC: 30 MMOL/L (ref 21–28)
HCO3 BLDV-SCNC: 30 MMOL/L (ref 21–28)
HCT VFR BLD AUTO: 27.2 % (ref 40–53)
HCT VFR BLD AUTO: 28.3 % (ref 40–53)
HGB BLD-MCNC: 8.2 G/DL (ref 13.3–17.7)
HGB BLD-MCNC: 8.2 G/DL (ref 13.3–17.7)
HGB BLD-MCNC: 8.3 G/DL (ref 13.3–17.7)
HGB BLD-MCNC: 8.7 G/DL (ref 13.3–17.7)
HGB UR QL STRIP: NEGATIVE
IGA SERPL-MCNC: 394 MG/DL (ref 70–380)
IGG SERPL-MCNC: 1610 MG/DL (ref 695–1620)
IGM SERPL-MCNC: 63 MG/DL (ref 60–265)
IMMUNOFIXATION ELP: ABNORMAL
INR PPP: 1.23 (ref 0.86–1.14)
KETONES UR STRIP-MCNC: NEGATIVE MG/DL
LEUKOCYTE ESTERASE UR QL STRIP: ABNORMAL
LMWH PPP CHRO-ACNC: 0.12 IU/ML
LMWH PPP CHRO-ACNC: 0.2 IU/ML
LMWH PPP CHRO-ACNC: 0.42 IU/ML
LMWH PPP CHRO-ACNC: NORMAL IU/ML
M TB TUBERC IFN-G BLD QL: NEGATIVE
M TB TUBERC IFN-G/MITOGEN IGNF BLD: 0 IU/ML
MAGNESIUM SERPL-MCNC: 2 MG/DL (ref 1.6–2.3)
MAGNESIUM SERPL-MCNC: 2.2 MG/DL (ref 1.6–2.3)
MAGNESIUM SERPL-MCNC: 2.3 MG/DL (ref 1.6–2.3)
MAGNESIUM SERPL-MCNC: 2.5 MG/DL (ref 1.6–2.3)
MCH RBC QN AUTO: 30.1 PG (ref 26.5–33)
MCH RBC QN AUTO: 30.2 PG (ref 26.5–33)
MCHC RBC AUTO-ENTMCNC: 30.5 G/DL (ref 31.5–36.5)
MCHC RBC AUTO-ENTMCNC: 30.7 G/DL (ref 31.5–36.5)
MCV RBC AUTO: 98 FL (ref 78–100)
MCV RBC AUTO: 99 FL (ref 78–100)
NITRATE UR QL: NEGATIVE
NUM BPU REQUESTED: 2
O2/TOTAL GAS SETTING VFR VENT: 100 %
O2/TOTAL GAS SETTING VFR VENT: 40 %
O2/TOTAL GAS SETTING VFR VENT: ABNORMAL %
OXYHGB MFR BLD: 98 % (ref 92–100)
OXYHGB MFR BLDV: 53 %
OXYHGB MFR BLDV: 57 %
OXYHGB MFR BLDV: 60 %
OXYHGB MFR BLDV: 62 %
OXYHGB MFR BLDV: 64 %
OXYHGB MFR BLDV: 66 %
PCO2 BLD: 34 MM HG (ref 35–45)
PCO2 BLD: 38 MM HG (ref 35–45)
PCO2 BLD: 38 MM HG (ref 35–45)
PCO2 BLD: 39 MM HG (ref 35–45)
PCO2 BLDV: 38 MM HG (ref 40–50)
PCO2 BLDV: 41 MM HG (ref 40–50)
PCO2 BLDV: 42 MM HG (ref 40–50)
PCO2 BLDV: 43 MM HG (ref 40–50)
PH BLD: 7.45 PH (ref 7.35–7.45)
PH BLD: 7.46 PH (ref 7.35–7.45)
PH BLD: 7.49 PH (ref 7.35–7.45)
PH BLD: 7.52 PH (ref 7.35–7.45)
PH BLDV: 7.41 PH (ref 7.32–7.43)
PH BLDV: 7.45 PH (ref 7.32–7.43)
PH BLDV: 7.46 PH (ref 7.32–7.43)
PH BLDV: 7.47 PH (ref 7.32–7.43)
PH BLDV: 7.47 PH (ref 7.32–7.43)
PH BLDV: 7.49 PH (ref 7.32–7.43)
PH UR STRIP: 5.5 PH (ref 5–7)
PHOSPHATE SERPL-MCNC: 2.8 MG/DL (ref 2.5–4.5)
PHOSPHATE SERPL-MCNC: 3.2 MG/DL (ref 2.5–4.5)
PHOSPHATE SERPL-MCNC: 3.6 MG/DL (ref 2.5–4.5)
PLATELET # BLD AUTO: 153 10E9/L (ref 150–450)
PLATELET # BLD AUTO: 157 10E9/L (ref 150–450)
PO2 BLD: 125 MM HG (ref 80–105)
PO2 BLD: 135 MM HG (ref 80–105)
PO2 BLD: 310 MM HG (ref 80–105)
PO2 BLD: 385 MM HG (ref 80–105)
PO2 BLDV: 30 MM HG (ref 25–47)
PO2 BLDV: 31 MM HG (ref 25–47)
PO2 BLDV: 33 MM HG (ref 25–47)
PO2 BLDV: 34 MM HG (ref 25–47)
PO2 BLDV: 35 MM HG (ref 25–47)
PO2 BLDV: 39 MM HG (ref 25–47)
POTASSIUM BLD-SCNC: 3.7 MMOL/L (ref 3.4–5.3)
POTASSIUM SERPL-SCNC: 3.5 MMOL/L (ref 3.4–5.3)
POTASSIUM SERPL-SCNC: 3.6 MMOL/L (ref 3.4–5.3)
POTASSIUM SERPL-SCNC: 3.9 MMOL/L (ref 3.4–5.3)
POTASSIUM SERPL-SCNC: 3.9 MMOL/L (ref 3.4–5.3)
PROT ELPH PNL UR ELPH: NORMAL
PROT SERPL-MCNC: 6.4 G/DL (ref 6.8–8.8)
RBC # BLD AUTO: 2.75 10E12/L (ref 4.4–5.9)
RBC # BLD AUTO: 2.89 10E12/L (ref 4.4–5.9)
RBC #/AREA URNS AUTO: 7 /HPF (ref 0–2)
SODIUM BLD-SCNC: 142 MMOL/L (ref 133–144)
SODIUM SERPL-SCNC: 142 MMOL/L (ref 133–144)
SODIUM SERPL-SCNC: 142 MMOL/L (ref 133–144)
SODIUM SERPL-SCNC: 143 MMOL/L (ref 133–144)
SP GR UR STRIP: 1.02 (ref 1–1.03)
SPECIMEN EXP DATE BLD: NORMAL
TRANSFUSION STATUS PATIENT QL: NORMAL
URN SPEC COLLECT METH UR: ABNORMAL
UROBILINOGEN UR STRIP-MCNC: NORMAL MG/DL (ref 0–2)
WBC # BLD AUTO: 6.4 10E9/L (ref 4–11)
WBC # BLD AUTO: 8.3 10E9/L (ref 4–11)
WBC #/AREA URNS AUTO: 59 /HPF (ref 0–2)

## 2017-04-19 PROCEDURE — 25000132 ZZH RX MED GY IP 250 OP 250 PS 637

## 2017-04-19 PROCEDURE — 25000132 ZZH RX MED GY IP 250 OP 250 PS 637: Performed by: STUDENT IN AN ORGANIZED HEALTH CARE EDUCATION/TRAINING PROGRAM

## 2017-04-19 PROCEDURE — 25000128 H RX IP 250 OP 636: Performed by: STUDENT IN AN ORGANIZED HEALTH CARE EDUCATION/TRAINING PROGRAM

## 2017-04-19 PROCEDURE — 93005 ELECTROCARDIOGRAM TRACING: CPT

## 2017-04-19 PROCEDURE — 81001 URINALYSIS AUTO W/SCOPE: CPT | Performed by: STUDENT IN AN ORGANIZED HEALTH CARE EDUCATION/TRAINING PROGRAM

## 2017-04-19 PROCEDURE — 82805 BLOOD GASES W/O2 SATURATION: CPT | Performed by: INTERNAL MEDICINE

## 2017-04-19 PROCEDURE — 99291 CRITICAL CARE FIRST HOUR: CPT | Mod: GC | Performed by: INTERNAL MEDICINE

## 2017-04-19 PROCEDURE — 36415 COLL VENOUS BLD VENIPUNCTURE: CPT | Performed by: STUDENT IN AN ORGANIZED HEALTH CARE EDUCATION/TRAINING PROGRAM

## 2017-04-19 PROCEDURE — 27210305 ZZH CATH BALLOON IABP

## 2017-04-19 PROCEDURE — 25000125 ZZHC RX 250

## 2017-04-19 PROCEDURE — 82947 ASSAY GLUCOSE BLOOD QUANT: CPT | Performed by: ANESTHESIOLOGY

## 2017-04-19 PROCEDURE — 82657 ENZYME CELL ACTIVITY: CPT | Performed by: INTERNAL MEDICINE

## 2017-04-19 PROCEDURE — 80048 BASIC METABOLIC PNL TOTAL CA: CPT | Performed by: INTERNAL MEDICINE

## 2017-04-19 PROCEDURE — 40000014 ZZH STATISTIC ARTERIAL MONITORING DAILY

## 2017-04-19 PROCEDURE — 25000125 ZZHC RX 250: Performed by: NURSE ANESTHETIST, CERTIFIED REGISTERED

## 2017-04-19 PROCEDURE — 00000146 ZZHCL STATISTIC GLUCOSE BY METER IP

## 2017-04-19 PROCEDURE — 86335 IMMUNFIX E-PHORSIS/URINE/CSF: CPT

## 2017-04-19 PROCEDURE — 74000 XR ABDOMEN PORT F1 VW: CPT

## 2017-04-19 PROCEDURE — 25000125 ZZHC RX 250: Performed by: INTERNAL MEDICINE

## 2017-04-19 PROCEDURE — 84100 ASSAY OF PHOSPHORUS: CPT | Performed by: INTERNAL MEDICINE

## 2017-04-19 PROCEDURE — 36000070 ZZH SURGERY LEVEL 5 EA 15 ADDTL MIN - UMMC: Performed by: THORACIC SURGERY (CARDIOTHORACIC VASCULAR SURGERY)

## 2017-04-19 PROCEDURE — 25800025 ZZH RX 258: Performed by: NURSE ANESTHETIST, CERTIFIED REGISTERED

## 2017-04-19 PROCEDURE — 85520 HEPARIN ASSAY: CPT

## 2017-04-19 PROCEDURE — 71010 XR CHEST PORT 1 VW: CPT

## 2017-04-19 PROCEDURE — S0171 BUMETANIDE 0.5 MG: HCPCS

## 2017-04-19 PROCEDURE — 33968 REMOVE AORTIC ASSIST DEVICE: CPT

## 2017-04-19 PROCEDURE — 83735 ASSAY OF MAGNESIUM: CPT | Performed by: INTERNAL MEDICINE

## 2017-04-19 PROCEDURE — 25000132 ZZH RX MED GY IP 250 OP 250 PS 637: Performed by: INTERNAL MEDICINE

## 2017-04-19 PROCEDURE — 40000048 ZZH STATISTIC DAILY SWAN MONITORING

## 2017-04-19 PROCEDURE — 27210794 ZZH OR GENERAL SUPPLY STERILE: Performed by: THORACIC SURGERY (CARDIOTHORACIC VASCULAR SURGERY)

## 2017-04-19 PROCEDURE — C1768 GRAFT, VASCULAR: HCPCS | Performed by: THORACIC SURGERY (CARDIOTHORACIC VASCULAR SURGERY)

## 2017-04-19 PROCEDURE — 84132 ASSAY OF SERUM POTASSIUM: CPT | Performed by: ANESTHESIOLOGY

## 2017-04-19 PROCEDURE — 87086 URINE CULTURE/COLONY COUNT: CPT | Performed by: INTERNAL MEDICINE

## 2017-04-19 PROCEDURE — 82330 ASSAY OF CALCIUM: CPT | Performed by: ANESTHESIOLOGY

## 2017-04-19 PROCEDURE — 85027 COMPLETE CBC AUTOMATED: CPT | Performed by: INTERNAL MEDICINE

## 2017-04-19 PROCEDURE — 84295 ASSAY OF SERUM SODIUM: CPT | Performed by: ANESTHESIOLOGY

## 2017-04-19 PROCEDURE — 25000128 H RX IP 250 OP 636: Performed by: THORACIC SURGERY (CARDIOTHORACIC VASCULAR SURGERY)

## 2017-04-19 PROCEDURE — 25000128 H RX IP 250 OP 636: Performed by: NURSE ANESTHETIST, CERTIFIED REGISTERED

## 2017-04-19 PROCEDURE — 36000068 ZZH SURGERY LEVEL 5 1ST 30 MIN - UMMC: Performed by: THORACIC SURGERY (CARDIOTHORACIC VASCULAR SURGERY)

## 2017-04-19 PROCEDURE — 5A02210 ASSISTANCE WITH CARDIAC OUTPUT USING BALLOON PUMP, CONTINUOUS: ICD-10-PCS | Performed by: THORACIC SURGERY (CARDIOTHORACIC VASCULAR SURGERY)

## 2017-04-19 PROCEDURE — 20000004 ZZH R&B ICU UMMC

## 2017-04-19 PROCEDURE — 25000128 H RX IP 250 OP 636

## 2017-04-19 PROCEDURE — 40000003 ZZH STATISTIC IR STAFF TIME IN THE OR

## 2017-04-19 PROCEDURE — 93010 ELECTROCARDIOGRAM REPORT: CPT | Performed by: INTERNAL MEDICINE

## 2017-04-19 PROCEDURE — 40000076 ZZH STATISTIC IABP MONITORING

## 2017-04-19 PROCEDURE — 37000009 ZZH ANESTHESIA TECHNICAL FEE, EACH ADDTL 15 MIN: Performed by: THORACIC SURGERY (CARDIOTHORACIC VASCULAR SURGERY)

## 2017-04-19 PROCEDURE — S0171 BUMETANIDE 0.5 MG: HCPCS | Performed by: STUDENT IN AN ORGANIZED HEALTH CARE EDUCATION/TRAINING PROGRAM

## 2017-04-19 PROCEDURE — 82803 BLOOD GASES ANY COMBINATION: CPT | Performed by: ANESTHESIOLOGY

## 2017-04-19 PROCEDURE — 40000047 ZZH STATISTIC CTO2 CONT OXYGEN TECH TIME EA 90 MIN

## 2017-04-19 PROCEDURE — 25000125 ZZHC RX 250: Performed by: STUDENT IN AN ORGANIZED HEALTH CARE EDUCATION/TRAINING PROGRAM

## 2017-04-19 PROCEDURE — 40000081 ZZH STATISTIC INSERT IABP

## 2017-04-19 PROCEDURE — 85018 HEMOGLOBIN: CPT | Performed by: STUDENT IN AN ORGANIZED HEALTH CARE EDUCATION/TRAINING PROGRAM

## 2017-04-19 PROCEDURE — 27210995 ZZH RX 272: Performed by: THORACIC SURGERY (CARDIOTHORACIC VASCULAR SURGERY)

## 2017-04-19 PROCEDURE — 82330 ASSAY OF CALCIUM: CPT | Performed by: INTERNAL MEDICINE

## 2017-04-19 PROCEDURE — 85610 PROTHROMBIN TIME: CPT | Performed by: INTERNAL MEDICINE

## 2017-04-19 PROCEDURE — 40000196 ZZH STATISTIC RAPCV CVP MONITORING

## 2017-04-19 PROCEDURE — 82330 ASSAY OF CALCIUM: CPT | Performed by: SURGERY

## 2017-04-19 PROCEDURE — 87040 BLOOD CULTURE FOR BACTERIA: CPT | Performed by: STUDENT IN AN ORGANIZED HEALTH CARE EDUCATION/TRAINING PROGRAM

## 2017-04-19 PROCEDURE — 85520 HEPARIN ASSAY: CPT | Performed by: INTERNAL MEDICINE

## 2017-04-19 PROCEDURE — P9016 RBC LEUKOCYTES REDUCED: HCPCS | Performed by: INTERNAL MEDICINE

## 2017-04-19 PROCEDURE — 40000275 ZZH STATISTIC RCP TIME EA 10 MIN

## 2017-04-19 PROCEDURE — 85730 THROMBOPLASTIN TIME PARTIAL: CPT | Performed by: INTERNAL MEDICINE

## 2017-04-19 PROCEDURE — 80076 HEPATIC FUNCTION PANEL: CPT | Performed by: INTERNAL MEDICINE

## 2017-04-19 PROCEDURE — 37000008 ZZH ANESTHESIA TECHNICAL FEE, 1ST 30 MIN: Performed by: THORACIC SURGERY (CARDIOTHORACIC VASCULAR SURGERY)

## 2017-04-19 PROCEDURE — 94002 VENT MGMT INPAT INIT DAY: CPT

## 2017-04-19 PROCEDURE — 25000565 ZZH ISOFLURANE, EA 15 MIN: Performed by: THORACIC SURGERY (CARDIOTHORACIC VASCULAR SURGERY)

## 2017-04-19 PROCEDURE — 71010 XR CHEST PORT 1 VW: CPT | Mod: 77

## 2017-04-19 PROCEDURE — 82805 BLOOD GASES W/O2 SATURATION: CPT | Performed by: SURGERY

## 2017-04-19 DEVICE — IMPLANTABLE DEVICE
Type: IMPLANTABLE DEVICE | Site: SUBCLAVIAN | Status: NON-FUNCTIONAL
Removed: 2017-05-08

## 2017-04-19 RX ORDER — HEPARIN SODIUM 1000 [USP'U]/ML
INJECTION, SOLUTION INTRAVENOUS; SUBCUTANEOUS PRN
Status: DISCONTINUED | OUTPATIENT
Start: 2017-04-19 | End: 2017-04-19 | Stop reason: HOSPADM

## 2017-04-19 RX ORDER — FENTANYL CITRATE 50 UG/ML
INJECTION, SOLUTION INTRAMUSCULAR; INTRAVENOUS PRN
Status: DISCONTINUED | OUTPATIENT
Start: 2017-04-19 | End: 2017-04-19

## 2017-04-19 RX ORDER — ACETAMINOPHEN 325 MG/1
650 TABLET ORAL EVERY 4 HOURS PRN
Status: DISCONTINUED | OUTPATIENT
Start: 2017-04-19 | End: 2017-05-12 | Stop reason: HOSPADM

## 2017-04-19 RX ORDER — PIPERACILLIN SODIUM, TAZOBACTAM SODIUM 4; .5 G/20ML; G/20ML
4.5 INJECTION, POWDER, LYOPHILIZED, FOR SOLUTION INTRAVENOUS EVERY 6 HOURS
Status: DISCONTINUED | OUTPATIENT
Start: 2017-04-19 | End: 2017-04-26

## 2017-04-19 RX ORDER — LIDOCAINE HYDROCHLORIDE 20 MG/ML
INJECTION, SOLUTION INFILTRATION; PERINEURAL PRN
Status: DISCONTINUED | OUTPATIENT
Start: 2017-04-19 | End: 2017-04-19

## 2017-04-19 RX ORDER — BUMETANIDE 0.25 MG/ML
2 INJECTION INTRAMUSCULAR; INTRAVENOUS ONCE
Status: COMPLETED | OUTPATIENT
Start: 2017-04-19 | End: 2017-04-19

## 2017-04-19 RX ORDER — PROTAMINE SULFATE 10 MG/ML
INJECTION, SOLUTION INTRAVENOUS PRN
Status: DISCONTINUED | OUTPATIENT
Start: 2017-04-19 | End: 2017-04-19

## 2017-04-19 RX ORDER — PROPOFOL 10 MG/ML
5-75 INJECTION, EMULSION INTRAVENOUS CONTINUOUS
Status: DISCONTINUED | OUTPATIENT
Start: 2017-04-19 | End: 2017-04-20

## 2017-04-19 RX ORDER — PROPOFOL 10 MG/ML
INJECTION, EMULSION INTRAVENOUS PRN
Status: DISCONTINUED | OUTPATIENT
Start: 2017-04-19 | End: 2017-04-19

## 2017-04-19 RX ORDER — SODIUM CHLORIDE, SODIUM LACTATE, POTASSIUM CHLORIDE, CALCIUM CHLORIDE 600; 310; 30; 20 MG/100ML; MG/100ML; MG/100ML; MG/100ML
INJECTION, SOLUTION INTRAVENOUS CONTINUOUS PRN
Status: DISCONTINUED | OUTPATIENT
Start: 2017-04-19 | End: 2017-04-19

## 2017-04-19 RX ORDER — HEPARIN SODIUM 1000 [USP'U]/ML
INJECTION, SOLUTION INTRAVENOUS; SUBCUTANEOUS PRN
Status: DISCONTINUED | OUTPATIENT
Start: 2017-04-19 | End: 2017-04-19

## 2017-04-19 RX ORDER — BUMETANIDE 0.25 MG/ML
3 INJECTION INTRAMUSCULAR; INTRAVENOUS ONCE
Status: COMPLETED | OUTPATIENT
Start: 2017-04-19 | End: 2017-04-19

## 2017-04-19 RX ADMIN — Medication 7 ML: at 20:18

## 2017-04-19 RX ADMIN — ACETAMINOPHEN 325 MG: 325 TABLET, FILM COATED ORAL at 21:15

## 2017-04-19 RX ADMIN — NOREPINEPHRINE BITARTRATE 6.4 MCG: 1 INJECTION INTRAVENOUS at 08:13

## 2017-04-19 RX ADMIN — PANTOPRAZOLE SODIUM 40 MG: 40 TABLET, DELAYED RELEASE ORAL at 20:18

## 2017-04-19 RX ADMIN — ROCURONIUM BROMIDE 30 MG: 10 INJECTION INTRAVENOUS at 08:45

## 2017-04-19 RX ADMIN — MULTIVIT AND MINERALS-FERROUS GLUCONATE 9 MG IRON/15 ML ORAL LIQUID 15 ML: at 15:19

## 2017-04-19 RX ADMIN — NOREPINEPHRINE BITARTRATE 6.4 MCG: 1 INJECTION INTRAVENOUS at 08:12

## 2017-04-19 RX ADMIN — ROCURONIUM BROMIDE 20 MG: 10 INJECTION INTRAVENOUS at 10:13

## 2017-04-19 RX ADMIN — LIDOCAINE HYDROCHLORIDE 60 MG: 20 INJECTION, SOLUTION INFILTRATION; PERINEURAL at 08:11

## 2017-04-19 RX ADMIN — Medication 220 MG: at 15:18

## 2017-04-19 RX ADMIN — Medication 5000 UNITS: at 09:52

## 2017-04-19 RX ADMIN — SODIUM CHLORIDE, POTASSIUM CHLORIDE, SODIUM LACTATE AND CALCIUM CHLORIDE: 600; 310; 30; 20 INJECTION, SOLUTION INTRAVENOUS at 08:04

## 2017-04-19 RX ADMIN — PROPOFOL 60 MG: 10 INJECTION, EMULSION INTRAVENOUS at 08:11

## 2017-04-19 RX ADMIN — ROCURONIUM BROMIDE 20 MG: 10 INJECTION INTRAVENOUS at 11:14

## 2017-04-19 RX ADMIN — POTASSIUM CHLORIDE 20 MEQ: 1.5 POWDER, FOR SOLUTION ORAL at 05:09

## 2017-04-19 RX ADMIN — NOREPINEPHRINE BITARTRATE 19.2 MCG: 1 INJECTION INTRAVENOUS at 08:20

## 2017-04-19 RX ADMIN — HYDRALAZINE HYDROCHLORIDE 50 MG: 50 TABLET ORAL at 05:09

## 2017-04-19 RX ADMIN — FENTANYL CITRATE 150 MCG: 50 INJECTION, SOLUTION INTRAMUSCULAR; INTRAVENOUS at 08:11

## 2017-04-19 RX ADMIN — POTASSIUM CHLORIDE 20 MEQ: 1.5 POWDER, FOR SOLUTION ORAL at 18:42

## 2017-04-19 RX ADMIN — HEPARIN SODIUM 1000 UNITS/HR: 10000 INJECTION, SOLUTION INTRAVENOUS at 22:08

## 2017-04-19 RX ADMIN — BUMETANIDE 2 MG: 0.25 INJECTION INTRAMUSCULAR; INTRAVENOUS at 13:15

## 2017-04-19 RX ADMIN — HUMAN INSULIN 3 UNITS/HR: 100 INJECTION, SOLUTION SUBCUTANEOUS at 02:23

## 2017-04-19 RX ADMIN — Medication 7 ML: at 15:18

## 2017-04-19 RX ADMIN — HYDRALAZINE HYDROCHLORIDE 50 MG: 50 TABLET ORAL at 21:15

## 2017-04-19 RX ADMIN — ASPIRIN 81 MG CHEWABLE TABLET 81 MG: 81 TABLET CHEWABLE at 15:19

## 2017-04-19 RX ADMIN — ISOSORBIDE DINITRATE 10 MG: 10 TABLET ORAL at 17:13

## 2017-04-19 RX ADMIN — FENTANYL CITRATE 50 MCG: 50 INJECTION, SOLUTION INTRAMUSCULAR; INTRAVENOUS at 11:22

## 2017-04-19 RX ADMIN — POTASSIUM CHLORIDE 20 MEQ: 29.8 INJECTION, SOLUTION INTRAVENOUS at 01:01

## 2017-04-19 RX ADMIN — Medication 2 G: at 04:48

## 2017-04-19 RX ADMIN — PROTAMINE SULFATE 50 MG: 10 INJECTION, SOLUTION INTRAVENOUS at 10:37

## 2017-04-19 RX ADMIN — ATORVASTATIN CALCIUM 40 MG: 40 TABLET, FILM COATED ORAL at 20:18

## 2017-04-19 RX ADMIN — HUMAN INSULIN 5 UNITS/HR: 100 INJECTION, SOLUTION SUBCUTANEOUS at 21:24

## 2017-04-19 RX ADMIN — HYDRALAZINE HYDROCHLORIDE 50 MG: 50 TABLET ORAL at 17:13

## 2017-04-19 RX ADMIN — CEFTRIAXONE 1 G: 1 INJECTION, POWDER, FOR SOLUTION INTRAMUSCULAR; INTRAVENOUS at 15:28

## 2017-04-19 RX ADMIN — MIDAZOLAM HYDROCHLORIDE 1 MG: 1 INJECTION, SOLUTION INTRAMUSCULAR; INTRAVENOUS at 07:57

## 2017-04-19 RX ADMIN — MIDAZOLAM HYDROCHLORIDE 1 MG: 1 INJECTION, SOLUTION INTRAMUSCULAR; INTRAVENOUS at 08:11

## 2017-04-19 RX ADMIN — PANTOPRAZOLE SODIUM 40 MG: 40 TABLET, DELAYED RELEASE ORAL at 15:17

## 2017-04-19 RX ADMIN — NOREPINEPHRINE BITARTRATE 6.4 MCG: 1 INJECTION INTRAVENOUS at 08:32

## 2017-04-19 RX ADMIN — VANCOMYCIN HYDROCHLORIDE 1500 MG: 10 INJECTION, POWDER, LYOPHILIZED, FOR SOLUTION INTRAVENOUS at 22:52

## 2017-04-19 RX ADMIN — NOREPINEPHRINE BITARTRATE 6.4 MCG: 1 INJECTION INTRAVENOUS at 08:27

## 2017-04-19 RX ADMIN — Medication 12.5 MG: at 21:15

## 2017-04-19 RX ADMIN — PIPERACILLIN AND TAZOBACTAM 4.5 G: 4; .5 INJECTION, POWDER, FOR SOLUTION INTRAVENOUS at 22:24

## 2017-04-19 RX ADMIN — HEPARIN SODIUM 1050 UNITS/HR: 10000 INJECTION, SOLUTION INTRAVENOUS at 04:44

## 2017-04-19 RX ADMIN — ROCURONIUM BROMIDE 50 MG: 10 INJECTION INTRAVENOUS at 08:11

## 2017-04-19 RX ADMIN — BUMETANIDE 3 MG: 0.25 INJECTION INTRAMUSCULAR; INTRAVENOUS at 03:48

## 2017-04-19 ASSESSMENT — LIFESTYLE VARIABLES: TOBACCO_USE: 1

## 2017-04-19 NOTE — ANESTHESIA PROCEDURE NOTES
SAMEERA Probe Insertion Note  Probe Inserted by:  JP NOEL in the presence of a teaching physician  CHERYL BRAVO   Insertion method: SAMEERA probe easily inserted   Bite block used:   Yes      Probe type:  Adult 2D   Insertion complications: No complications.      SAMEERA report NOT being generated

## 2017-04-19 NOTE — PROGRESS NOTES
Calorie Counts    Intake Recorded For: 04/18  Kcals: 0  Protein: 0 g    # Meals Recorded: No intake recorded    # Supplements Recorded: 0

## 2017-04-19 NOTE — PROGRESS NOTES
Pt extubated at 1503 and placed on a 4 liter oxyplus mask. BS: good aeration bilateral. Non-productive weak cough. Please see flowsheets for further information.

## 2017-04-19 NOTE — PROGRESS NOTES
Pt admitted to 4E ICU s/p right subclavian intra-aortic balloon pump insertion. Please see flowsheets for further information.

## 2017-04-19 NOTE — PLAN OF CARE
Problem: Intra-Aortic Balloon Pump (Adult)  Goal: Signs and Symptoms of Listed Potential Problems Will be Absent or Manageable (Intra-Aortic Balloon Pump)  Signs and symptoms of listed potential problems will be absent or manageable by discharge/transition of care (reference Intra-Aortic Balloon Pump (Adult) CPG).   Outcome: Improving  D: Transfer from OSH with cardiogenic shock. IABP and SWAN monitoring.   I/A: Afebrile. -130's, ST. MAP's 70-80's. GENE Q4hr: CO: 4.4, CI: 2.7, SVR: 1137, CVP: 16, PA: 50/36. IABP 1:2, weaned from 1:1 @ 0121, slaving from R radial ART, EKG trigger. RR 20, 2 Lpm O2 overnight for apneic periods and destating. SvO2:  50-60. Periodically lethargic overnight, disoriented to situation and time periodically as well. Follows commands, more encouragement needed during periods of lethargy. TF @ goal of 45 mL/hr, Free water 30 mL/q4hr. Na 143, trending up. K+ replaced x2, Mg replaced x1. Stool x2, no s/s of bleeding. Bumex x2, responsive for 3-4 hours then UO tapers down. Goal for - 0.5-1 L/daily. Heparin increased to 1050, recheck Hep Xa @ 1100. Insulin gtt titrated per Algorithm 2. Milrinone 0.375. No acute events overnight.   P: Possible subclavian IABP placement today (x2 chlorhexidine scrubs overnight). Monitor cardiac function and fluid status. Will continue to monitor.

## 2017-04-19 NOTE — PROGRESS NOTES
"[facility] - [place]  Cardiology II Service / Advanced Heart Failure        Interval History:   - No acute events overnight  - PCWP 22 this AM  - Going to OR for SC IABP today          Intake/Output Summary (Last 24 hours) at 04/19/17 1501  Last data filed at 04/19/17 1400   Gross per 24 hour   Intake          2104.97 ml   Output             3110 ml   Net         -1005.03 ml             Pertinent Medications:  I have reviewed this patient's current medications      QUEtiapine  12.5 mg Oral At Bedtime     bumetanide  2 mg Intravenous Once     pantoprazole  40 mg Per Feeding Tube BID     zinc sulfate (20 mg [place]. Zn/mL)  220 mg Per Feeding Tube Daily     vitamin A-D & C drops  7 mL Per Feeding Tube BID     isosorbide dinitrate  10 mg Oral TID AC     atorvastatin  40 mg Oral or Feeding Tube Daily at 8 pm     cefTRIAXone  1 g Intravenous Q24H     hydrALAZINE  50 mg Oral Q6H     pneumococcal vaccine  0.5 mL Intramuscular Once     sodium chloride (PF)  3 mL Intracatheter Q8H     multivitamins with minerals  15 mL Per Feeding Tube Daily     aspirin  81 mg Oral or Feeding Tube Daily         Examination:  /59  Temp 98.2  F (36.8  C) (Axillary)  Resp 13  Ht 1.575 m (5' 2.01\")  Wt 60 kg (132 lb 4.4 oz)  SpO2 100%  BMI 24.19 kg/m2  GEN: A, cooperative and conversational   NECK: can not assess JVP   RESP: crackles   CV: S1S2S3, holossystolic murmur at the apex  ABD: Soft, nontender to palpation, no HSM, +BS, no guarding  EXT: No peripheral edema, warm/well perfused   NEURO: CN II-XII intact, normal 5/5 strength in all extremitites  SKIN: Normal skin turgor, no rash on limited exam    Data:  CMP  Recent Labs  Lab 04/19/17  0903 04/19/17  0340 04/18/17  2349 04/18/17  1434  04/18/17  0329  04/17/17  1549 04/17/17  0404  04/16/17  0350    143  --  140  --  142  --  143 145*  < > 140   POTASSIUM 3.7 3.9 3.5 4.2  < > 3.8  < > 3.6 3.8  < > 3.0*   CHLORIDE  --  107  --  106  --  107  --  " 108 108  < > 106   CO2  --  27  --  29  --  26  --  26 30  < > 25   ANIONGAP  --  9  --  5  --  10  --  8 8  < > 9   * 167*  --  141*  --  150*  --  104* 99  < > 132*   BUN  --  24  --  24  --  23  --  21 24  < > 32*   CR  --  1.08  --  1.04  --  1.04  --  0.99 1.16  < > 1.44*   GFRESTIMATED  --  72  --  76  --  76  --  80 67  < > 52*   GFRESTBLACK  --  88  --  >90African American GFR Calc  --  >90African American GFR Calc  --  >90African American GFR Calc 81  < > 63   ROB  --  8.2*  --  7.5*  --  6.9*  --  7.0* 7.0*  < > 7.1*   MAG  --  2.0 2.2 2.2  --  2.1  < > 2.4* 2.5*  < > 2.3   PHOS  --  2.8 3.2 3.9  --  2.4*  < >  --   --   --   --    PROTTOTAL  --  6.4*  --   --   --  6.4*  --   --  6.5*  --  6.7*   ALBUMIN  --  2.2*  --   --   --  2.1*  --   --  2.2*  --  2.2*   BILITOTAL  --  0.5  --   --   --  0.6  --   --  0.6  --  0.9   ALKPHOS  --  63  --   --   --  64  --   --  55  --  62   AST  --  40  --   --   --  20  --   --  11  --  21   ALT  --  51  --   --   --  38  --   --  36  --  40   < > = values in this interval not displayed.  CBC    Recent Labs  Lab 04/19/17  0903 04/19/17  0340 04/18/17  0329 04/18/17  0040 04/17/17 2002 04/17/17  0404   WBC  --  6.4 7.1  --  9.4 8.7   RBC  --  2.89* 2.67*  --  2.98* 2.81*   HGB 8.2* 8.7* 8.0* 8.4* 9.1* 8.3*   HCT  --  28.3* 26.4*  --  29.2* 27.3*   MCV  --  98 99  --  98 97   MCH  --  30.1 30.0  --  30.5 29.5   MCHC  --  30.7* 30.3*  --  31.2* 30.4*   RDW  --  17.8* 18.0*  --  18.1* 18.0*   PLT  --  153 136*  --  128* 130*     INR  No lab results found in last 7 days.  Arterial Blood Gas    Recent Labs  Lab 04/19/17  1157 04/19/17  0903 04/19/17  0340 04/19/17  0218  04/18/17  1029   PH 7.45 7.46*  --   --   --  7.48*   PCO2 38 39  --   --   --  37   PO2 310* 385*  --   --   --  63*   HCO3 26 28  --   --   --  27   O2PER 100  100 100.0 2L 2L  < > 21%   < > = values in this interval not displayed.    Imaging Studies:  Echo 3/27  The visual ejection fraction is  estimated at 30-35%.  There is extensive inferior, inferoseptal, and inferolateral wall akinesis.  Basal/mid lateral wall hypokinesis  There is moderate to severe septal hypokinesis.  Septal wall motion abnormality may reflect pacemaker activation.  There is a catheter/pacemaker lead seen in the right ventricle.  The right ventricle is mildly dilated.  Severely decreased right ventricular systolic function  There is no pericardial effusion.  The rhythm was paced.  Compared to the prior study, the LVEF appears somewhat decreased. Septal wall  motion abnormality larger- may reflect, in part, that patient in sinus tach on  prior study, and ventricular paced now. No hemodynamically significant  valvular abnormalities on 2D or color flow imaging     CT chest/abdomen 3/27   IMPRESSION:   1. Small mediastinal hemorrhage, small bilateral pleural effusions  which may be hemorrhagic. Small intraperitoneal hemorrhage. Minimal  pericardial hemorrhage.  2. Minimal pneumoperitoneum in the left paracolic gutter, of unknown  significance. No pneumatosis as clinically questioned.  3. IABP slightly low in position.  4. Bilateral pulmonary dependent consolidations represent compression  atelectasis, however differentials include aspiration.     Cath 3/26 [facility]  SUMMARY:   --Inferior STEMI w/ late presentation. Culprit dictated by complete  heart block, and cardiogenic shock. Emergent echo in the Cath Lab also  shows significant RV dysfunction.  --Three vessel coronary artery disease with diffuse coronary disease  out to the distal vessels  --Successful POBA of the prox and mid-RCA. Stent was not placed, in  anticipation he may need to be considered for bypass surgery in the  near future  --Insertion of a temporary pacemaker for bradycardia (AVB) and  hypotension  --Insertion of a IABP  --Upper GI bleed consistent with [place]     [facility] 3/27     CT head 3/28: normal      Echo 3/29  Interpretation  Summary  Limited study. Ischemic CM. Moderately (EF 30-35%) reduced left ventricular  function is present. Traced at 32%.  Posterior wall akinesis is present. Lateral wall akinesis is present. Inferior  wall akinesis is present.  Right ventricular function, chamber size, wall motion, and thickness are  normal.  Dilation of the inferior vena cava is present with abnormal respiratory  variation in diameter.     Compared to prior study, RV fxn is improved.     Echo 3/31  Left Ventricle  The Ejection Fraction is estimated at 50-55%. Inferior,posterior,lateral,basal  septal wall akinesis as reported before. No change.     CORONARY ANGIOGRAM 4/1:   1. Both coronary arteries arise from their respective cusps.  2. Right dominant.  3. LM has mild luminal irregularities.   4. LAD supplies the apex along with the RCA (type 2 LAD) and gives rise to septal perforators and a large and branching D1. There are widely patent stents extending from the proximal to mid LAD. The dLAD has mild to moderate disease to 30% stenosis. The D1 is jailed by the LAD stents, but has EBER 3 flow with disease to 30% stenosis.   5. The small ramus is no longer present.  6. LCX is occluded at the ostium.   7. RCA gives rise to PL branches and supplies PDA. There are widely patent stents extending from the proximal to mid RCA. The remainder of the mRCA has disease to 50% stenosis and the dRCA has disease to 10% stenosis. The RPDA has a widely patent stent and the remainder of the artery has mild disease to 10% stenosis. The RPLA has small vessels with diffuse disease including a distal branch occlusion. The RPLA supplies some small collateral branches to the dLCx.      COMPLICATIONS:  1. None      SUMMARY:   1. Cardiogenic shock following an inferior STEMI.  2. The LCx re-occlusion is not surprising as the recently revascularized LCx  had poor outflow and the revascularized portion did not supply a large territory (limited to no flow was present  distal to the stented segment immediately following stenting). Repeat revascularization of the LCx would result in the same outcome of repeat closure and also risk embolizing thrombus from the LCx into the LAD so no treatment of the occluded LCx was performed.   3. Three vessel coronary artery disease with patent LAD and RCA stents and an occluded LCx.     Echo 4/3  Left ventricular size is normal.  The Ejection Fraction is estimated at 35-40%.  Inferior wall akinesis is present.  Lateral wall akinesis is present.  Posterior wall akinesis is present.  Right ventricular function, chamber size, wall motion, and thickness are  normal.  Moderate mitral insufficiency is present.  Moderate tricuspid insufficiency is present.  Right ventricular systolic pressure is 47mmHg above the right atrial pressure.  The inferior vena cava is normal.     Echo 4/5  Moderately (EF 35-40%) reduced left ventricular function is present. Traced at  40%.  Moderate mitral insufficiency is present.  The cause of the mitral insufficiency appears to be restricted posterior  leaflet from posterior MI. No mitral leaflet pathology seen.  Dilation of the inferior vena cava is present with abnormal respiratory  variation in diameter.     CT abd without contrast 4/4  IMPRESSION:   1. No evidence of ischemic bowel, obstruction, or intra-abdominal  infection.  2. Bibasilar patchy pulmonary opacities likely represent combination  of aspiration and atelectasis.  3. Small amount of simple free fluid within the lower abdomen.  4. Small left retroperitoneal hematoma along the iliac vasculature  likely postprocedural.   5. Unchanged size of bilateral pleural effusions, which now consist of  simple fluid.    Assessment and Plan  51 year old man presented with cardiogenic shock from inferior STEMI s/p RCA aspiration thrombectomy and POBA on 3/26 at [facility] s/p IABP and initiation of Dopamine, also during procedure, CHB s/p temp pacer, emesis/hematmesis and  altered mental status s/p intubation transferred here for consideration of mechanical support on 3/27. Had PCI to RCA, LAD and LCx (on ASA and plavix) started on epinephrine in addition to dopamine, post procedure developed distributive shock picture from infection (aspiration pneumonia? Sputum cx growing staph aureus, on vanco and zosyn) vs post-MI SIRS response. Currently in cardiogenic shock with IABP in situ    Card  # Cardiogenic shock 4/3- from underlying 3v CAD-has had high SVR and low CI, complicated by ischemic MR  # Due to inferior wall STEMI. S/P 7 stents to LAD, circ, RCA (angiogram report pending). Now with IABP, temporary pacemaker after 3rd deg heart block in cath lab at [facility] on 3/26   # Small pericardial hemorrhage   - IABP in place  - Hydral 50mg q6H, once his SCr improves will consider starting Nipride for a more stable afterload reduction regimen, and give him an IABP wean trial  - Hold off on isordil for now  - LVAD work up given SCr improving  - Cortisol normal  - Continue Milrinone 0.375  - Plan to keep CVP 9-12 today, net 500 cc to 1 L negative  - Plan for SC IABP today  - Will follow up urine and serum protein immunofixation as well as alpha galactosidase levels    # Neuro  - alert, follows commands  - WBC stable   - Seroquel 25 mg         Respiratory  #Intubated for airway protection due to mental status and emesis on 3/26, extubated 4/3  #Probable Aspiration PNA/pneumonitis vs MSSA pneumonia         # ID  - UA 4/15 with cloudy urine, 50 WBCs, many bacteria, no nitrates. He is on Ceftriaxone for that         # Endocrine  T2DM: HA1C 7.5 on admission.  - Insulin gtt per nurising protocol with hypoglycemia precautions.       # GI    - Had several maroon BMs since 4/11, Hb overall dropped by a 1.5 grams or so, heparin inf held and GI consulted  - Colonoscopy 4/17 showed a rectal ulcer but no intervenable lesions  - Will continue to monitor Hb  - Protonix 40 PO BID         # Renal/  Renal  function slightly improved- from hypoperfusion from cardiogenic shock 4/5 plus central venous congestion plus contrast nephropathy  -Diuresis  - Daily Cr  - Avoid nephrotoxins as able  - Plan to make 500cc to 1L net negative today     -Hypernatremia  - Resolved. Stopped his D5W, only getting FWF's.    #Heme  -Dropping Hgb. Plan as above      FEN: feeding tube  Code: FULL  PPx: PPI 40mg BID, senna PRN  Dispo: pending stability        Plan of care discussed with Dr. Yue Loving MD  Cardiovascular Disease Fellow  Pager: [phone]

## 2017-04-19 NOTE — PLAN OF CARE
Problem: Goal Outcome Summary  Goal: Goal Outcome Summary  Outcome: No Change  D: Femoral IABP currently in place. Recent history of inability to maintain adequate cardiac function without IABP in place. Recent EF of 35-40%.   I/A: OR this AM for replacement of femoral IABP with subclavian. Intubated for procedure, weaned to extubate, extubated around 1500. On 3L NC now. Weak cough, IS and coughing encouraged strongly this shift. Bedside swallow trial x2, wet coughing and unable to tolerate thin liquids. MD notified, swallow study ordered. IABP site CDI with MAPs 60s-90s, tachycardia increasing to 130s near [id number], MD notified, received scheduled hydralazine and isordil at 1700. CI 3.1, SVR 1107, SVO2 64 at 1600. TF restarted after xray verification, now at goal of 50 mL/hr.   P: Continue plan of care, update MD and family with changes.

## 2017-04-19 NOTE — PROGRESS NOTES
"      GASTROENTEROLOGY PROGRESS NOTE        ASSESSMENT/ RECOMMENDATIONS:  51 year old male with DMII, newly diagnosed CAD s/p STEMI on 3/26 and subsequent PCI, balloon pump use complicated by distributive and cardiogenic shock with hospital course c/b hematochezia. He underwent colonoscopy yesterday which showed a rectal ulcer related to rectal tube placement. No evidence of further bleeding on IV heparin.      Avoid rectal tube replacement    Ok to continue anticoagulation    Repeat colonoscopy for polyp removal as an outpatient once acute issues resolve     Agree with PPI for GI ppx given critical illness    Will sign off. Please call with questions or concerns or if he rebleeds.    The patient was discussed and plan agreed upon with GI staff, Dr. Zaldivar.     Sindhu Morales MD  Gastroenterology Fellow  _______________________________________________________________      S: Heparin gtt resumed yesterday, having green/brown stools. Hgb stable.     O:  Blood pressure 109/59, temperature 98.2  F (36.8  C), temperature source Oral, resp. rate 20, height 1.575 m (5' 2.01\"), weight 58.4 kg (128 lb 12 oz), SpO2 96 %.    LABS:  BMP    Recent Labs  Lab 04/18/17  1434 04/18/17  1029 04/18/17  0329 04/17/17  2350 04/17/17  1549 04/17/17  0404     --  142  --  143 145*   POTASSIUM 4.2 3.8 3.8 3.4  3.8 3.6 3.8   CHLORIDE 106  --  107  --  108 108   ROB 7.5*  --  6.9*  --  7.0* 7.0*   CO2 29  --  26  --  26 30   BUN 24  --  23  --  21 24   CR 1.04  --  1.04  --  0.99 1.16   *  --  150*  --  104* 99     CBC  Recent Labs  Lab 04/18/17  0329  04/17/17  2002 04/17/17  0404  04/16/17  0350   WBC 7.1  --  9.4 8.7  --  8.8   RBC 2.67*  --  2.98* 2.81*  --  2.83*   HGB 8.0*  < > 9.1* 8.3*  < > 8.4*   HCT 26.4*  --  29.2* 27.3*  --  27.4*   MCV 99  --  98 97  --  97   MCH 30.0  --  30.5 29.5  --  29.7   MCHC 30.3*  --  31.2* 30.4*  --  30.7*   RDW 18.0*  --  18.1* 18.0*  --  17.5*   *  --  128* 130*  --  119*   < > = " values in this interval not displayed.  INR    Recent Labs  Lab 04/12/17  0352   INR 1.12     LFTs    Recent Labs  Lab 04/18/17  0329 04/17/17  0404 04/16/17  0350 04/15/17  0350   ALKPHOS 64 55 62 71   AST 20 11 21 27   ALT 38 36 40 47   BILITOTAL 0.6 0.6 0.9 0.8   PROTTOTAL 6.4* 6.5* 6.7* 6.7*   ALBUMIN 2.1* 2.2* 2.2* 2.3*      PANCNo lab results found in last 7 days.

## 2017-04-19 NOTE — PROGRESS NOTES
CLINICAL NUTRITION SERVICES - BRIEF NOTE    -Resp: Remains intubated post-op.  -EN access: Awaiting AXR to confirm FT position post-op as SAMEERA in OR.  -EN composition: Previously receiving TF (Peptamen 1.5) @ goal 45 ml/hr via nasoduodenal FT, off since 0900 for OR today.  -Diet: No order given OR and now intubated.     INTERVENTIONS  Recommendations / Nutrition Prescription  Rec slight increase in goal TF rate to better meet estimated needs with unclear timeline for ability to adv diet/resume PO at this time, Peptamen 1.5 @ goal 50 ml/hr (1200 ml/day) to provide 1800 kcal (32 kcal/kg), 82 g PRO (1.5 g/kg), 924 ml free H2O, 67 g Fat (70% from MCTs), 226 g CHO and no Fiber daily per dosing weight of 56 kg.    Implementation  Enteral Nutrition - Modify rate- Adjusted TF order as rec above.       Mariangel Salmon RD, LD (pager [phone])  RD will continue to follow

## 2017-04-19 NOTE — PLAN OF CARE
Problem: Goal Outcome Summary  Goal: Goal Outcome Summary  Outcome: No Change  Pt remained lethargic at times throughout the day but easier to arouse than 0800 assesment. Disoriented at times to situation, oriented x 4 at others. HR 100s-120s. Maps stable off hydralazine & isurdil- held for surgery this evening.  TF shut off at 1330. Heparin gtt stopped this am for surgery. Now surgery pushed to 4/19, pt eating dinner, restarted TF, restarted insulin gtt. Heparin restarted. Ok to resume hydralazine this evening and touch base with Cards 2 team before giving isurdil per Dr. Loving. PAP 40/30s, cvp 14-16. svo2s in 50s. Will continue to monitor and follow plan of care.     Problem: Intra-Aortic Balloon Pump (Adult)  Goal: Signs and Symptoms of Listed Potential Problems Will be Absent or Manageable (Intra-Aortic Balloon Pump)  Signs and symptoms of listed potential problems will be absent or manageable by discharge/transition of care (reference Intra-Aortic Balloon Pump (Adult) CPG).   Outcome: No Change    04/18/17 1600 04/18/17 1901   Intra-Aortic Balloon Pump   Problems Assessed (Intra-Aortic Balloon Pump) --  all   Problems Present (Intra-Aortic Balloon Pump) hemodynamic instability;inability to wean;mechanical dysfunction;skin breakdown --

## 2017-04-19 NOTE — ANESTHESIA PREPROCEDURE EVALUATION
Anesthesia Evaluation     . Pt has had prior anesthetic.     No history of anesthetic complications          ROS/MED HX    ENT/Pulmonary:     (+)tobacco use, Current use , recent URI . .    Neurologic:       Cardiovascular:     (+) Dyslipidemia, --CAD, -past MI,-. : . CHF etiology: Ischemic Last EF: 36% . . :. . Previous cardiac testing Echodate:4/7/17results:Interpretation Summary  Moderately (EF 38%) reduced left ventricular function is present.  Global right ventricular function is normal.  Mild to moderate tricuspid and mitral insufficiency is present.  The atrial septum is intact as assessed by color Doppler and agitated saline  bubble study .  Estimated pulmonary artery systolic pressure is 38 mmHg plus right atrial  pressure.  The inferior vena cava was dilated at 2.1 cm without respiratory variability,  consistent with increased right atrial pressure.  No pericardial effusion is present.  _____________________________________________________________________________  __        Left Ventricle  Left ventricular size is normal. :LVIDd/s = 4.6/4.0 cm. Moderately (EF 35-40%)  reduced left ventricular function is present.     Right Ventricle  The right ventricle is normal size. Global right ventricular function is  normal. TAPSE 1.4 cm, S' 10 cm/s. A right heart catheter is noted in the right  ventricle.     Atria  Both atria appear normal. The atrial septum is intact as assessed by color  Doppler and agitated saline bubble study .     Mitral Valve  Mild mitral annular calcification is present. Mild to moderate mitral  insufficiency is present.        Aortic Valve  Mild aortic insufficiency is present.     Tricuspid Valve  Moderate tricuspid insufficiency is present. Estimated pulmonary artery  systolic pressure is 38 mmHg plus right atrial pressure.     Pulmonic Valve  The pulmonic valve cannot be assessed.     Vessels  The inferior vena cava was dilated at 2.1 cm without respiratory variability,  consistent  "with increased right atrial pressure.     Pericardium  No pericardial effusion is present.  date: results:ECG reviewed date:4/13/17 results:Sinus tachycardia  Prolonged QT  Low voltage QRS  Inferior infarct (cited on or before 01-APR-2017)  Abnormal ECG  When compared with ECG of 11-APR-2017 14:56,  Serial changes of evolving Inferior infarct PresentCath date: 4/13 results:          METS/Exercise Tolerance:     Hematologic:         Musculoskeletal:         GI/Hepatic:         Renal/Genitourinary:         Endo:         Psychiatric:         Infectious Disease:         Malignancy:         Other:    (+) No chance of pregnancy C-spine cleared: Yes, no H/O Chronic Pain,no other significant disability                  Procedure: Procedure(s):  Subclavian Balloon Pump Insertion  - Wound Class: I-Clean    HPI: Luke Henao is a 51 year old male with ischemic cardiomyopathy, s/p multiple IABP and with each removal patient significantly decompensates. He is scheduled for above procedure to transition from femoral balloon pump. Per cardiology:    \"51 year old man presented with cardiogenic shock from inferior STEMI s/p RCA aspiration thrombectomy and POBA on 3/26 at [facility] s/p IABP and initiation of Dopamine, also during procedure, CHB s/p temp pacer, emesis/hematmesis and altered mental status s/p intubation transferred here for consideration of mechanical support on 3/27. Had PCI to RCA, LAD and LCx (on ASA and plavix) started on epinephrine in addition to dopamine, post procedure developed distributive shock picture from infection (aspiration pneumonia? Sputum cx growing staph aureus, on vanco and zosyn) vs post-MI SIRS response. Currently in cardiogenic shock with IABP in situ\"    PMHx/PSHx:  No past medical history on file.    Past Surgical History:   Procedure Laterality Date     COLONOSCOPY N/A 4/17/2017    Procedure: COLONOSCOPY;  Surgeon: Rashaad Bundy MD;  Location: [facility]         No current " facility-administered medications on file prior to encounter.   Current Outpatient Prescriptions on File Prior to Encounter:  ELOCON 0.1 % EX CREA apply to bug bites qd-bid prn   OCUFLOX 0.3 % OP SOLN 1-2 gtts in eye q 4-6 hrs        Social Hx:   Social History   Substance Use Topics     Smoking status: Current Every Day Smoker     Packs/day: 0.50     Types: Cigarettes     Smokeless tobacco: Not on file     Alcohol use Not on file       Allergies: No Known Allergies      NPO Status: Per ASA Guidelines    Labs:    Blood Bank:  Lab Results   Component Value Date    ABO A 04/16/2017    RH  Pos 04/16/2017    AS Neg 04/16/2017     BMP:  Recent Labs   Lab Test  04/19/17   0903  04/19/17   0340   NA  142  143   POTASSIUM  3.7  3.9   CHLORIDE   --   107   CO2   --   27   BUN   --   24   CR   --   1.08   GLC  107*  167*   ROB   --   8.2*     CBC:   Recent Labs   Lab Test  04/19/17   0903  04/19/17   0340   WBC   --   6.4   RBC   --   2.89*   HGB  8.2*  8.7*   HCT   --   28.3*   MCV   --   98   MCH   --   30.1   MCHC   --   30.7*   RDW   --   17.8*   PLT   --   153     Coags:  Recent Labs   Lab Test  04/12/17   0352   04/08/17   0425   03/28/17   1233   INR  1.12   < >  1.19*   < >   --    PTT   --    --   47*   < >   --    FIBR   --    --    --    --   375    < > = values in this interval not displayed.         Physical Exam  Normal systems: dental    Airway   Mallampati: III  TM distance: <3 FB  Neck ROM: full  Comment: Small mouth opening. History of G1V with glidescope    Dental     Cardiovascular     PE comment: IABP    Pulmonary (+) decreased breath sounds                       Anesthesia Plan      History & Physical Review  History and physical reviewed and following examination; no interval change.    ASA Status:  4 .        Plan for General and ETT with Intravenous and Propofol induction. Maintenance will be Balanced.    PONV prophylaxis:  Ondansetron (or other 5HT-3)  Additional equipment: Videolaryngoscope, 2nd IV,  Arterial Line, Central Line, PA Catheter and SAMEERA - ASA 4  - GETA with standard ASA monitors, IV induction, balanced anesthetic  - Right A-line, Right PICC, LIJ introducer and swan in situ.  - Femoral IABP in situ  - SAMEERA  - Antibiotics per surgery  - PONV prophylaxis  - Pain management with Fentanyl/dilaudid boluses     Agree w above.  Pt very lethargic, but answers qs, will most likely leave intubated at end of case.        Postoperative Care  Postoperative pain management:  IV analgesics.      Consents  Anesthetic plan, risks, benefits and alternatives discussed with:  Patient.  Use of blood products discussed: Yes.   Use of blood products discussed with Patient.  Consented to blood products.  .              Ashvin Paulino MD  Anesthesiology Resident CA3 / PGY-4

## 2017-04-19 NOTE — ANESTHESIA CARE TRANSFER NOTE
Patient: Luke Henao    Procedure(s):  Right Subclavian Intra Aortic Balloon Pump Insertion using Maquet 40cc Ballon Catheter, Implentation of 8mm Gelweave Woven Vascular Prosthesis, Removal of Left Femoral Ballon Pump Catheter, Flouroscopy - Wound Class: I-Clean    Diagnosis: Heart Failure   Diagnosis Additional Information: No value filed.    Anesthesia Type:   No value filed.     Note:  Airway :ETT  Patient transferred to:ICU  Comments: Pt intubated and sedated on ICU bed to , VSS on arrival, placed on vent, gtts reviewed, femstop in place on right groin, report to RN, care accepted.      Vitals: (Last set prior to Anesthesia Care Transfer)    CRNA VITALS  4/19/2017 1059 - 4/19/2017 1149      4/19/2017             ART BP: 116/64    ART Mean: 97    Ht Rate: 106    SpO2: 100 %    EKG: Sinus tachycardia                Electronically Signed By: RUBI Ervin CRNA  April 19, 2017  11:49 AM

## 2017-04-19 NOTE — OP NOTE
DATE OF SURGERY:  04/19/2017      PREOPERATIVE DIAGNOSES:   1.  Status post recent myocardial infarction.   2.  Cardiogenic shock after myocardial infarction requiring transfemoral intraaortic balloon pump.   3.  Failed weaning from the intraaortic balloon pump requiring multiple inotropic support.   4.  Three-vessel coronary artery disease.   5.  Severely decreased left ventricular systolic function with ejection fraction of 30-35%.   6.  Renal insufficiency.      POSTOPERATIVE DIAGNOSES:   1.  Status post recent myocardial infarction.   2.  Cardiogenic shock after myocardial infarction requiring transfemoral intraaortic balloon pump.   3.  Failed weaning from the intraaortic balloon pump requiring multiple inotropic support.   4.  Three-vessel coronary artery disease.   5.  Severely decreased left ventricular systolic function with ejection fraction of 30-35%.   6.  Renal insufficiency.      SURGICAL PROCEDURES PERFORMED:   1.  Implantation of the 8 mm Gelweave vascular graft to the right subclavian artery as a conduit.   2.  Insertion of the intraaortic balloon pump through the right subclavian artery graft.   3.  Fluoroscopy to guide the insertion of the subclavian intraaortic balloon pump.   4.  Removal of the old intraaortic balloon pump from the femoral artery.      SURGEON:  Keshav Leung MD      COSURGEON:  Dr. Branden Xavier.  Because of the complexity of surgery and also due to the lack of experience, cardiac surgery fellow, a second attending is needed.  Dr. Xavier is the cosurgeon.      ASSISTANT:  Pablo Edwards MD      INDICATION FOR SURGERY:  Luke Henao is a 51-year-old gentleman who had a history of multiple cardiac risk factors.  He had a myocardial infarction about 6 weeks ago.  He developed severely decreased LV function with unstable hemodynamics and cardiogenic shock.  He requires multiple inotropic support and also intraaortic balloon pump support.  He failed to be weaned from the transfemoral  intraaortic balloon pump.  As a matter of fact, he had 3 different insertions of the transfemoral intra-aortic balloon pump.  Each time within 24 hours after the weaning and removal of the intraaortic balloon pump, his hemodynamics get worse and his renal function also deteriorated and required the insertion of the new intraaortic balloon pump.  The patient at this moment is still being worked up for possible left ventricular assist device support as a bridge to heart transplantation.  At this moment, the patient needs another relatively long-term or mid-term indwelling intraaortic balloon catheter implantation for the hemodynamic support.  The right subclavian artery intraaortic balloon pump insertion appears to be a better choice at this stage.  The benefits and risks of surgery were explained to the patient's family and the consent was obtained.      SURGICAL PROCEDURE:  The patient was taken to the operating room.  He was intubated already.  General anesthesia was given.  His chest, abdomen and bilateral lower extremities were prepped and draped in sterile fashion.  We made a 5 cm incision in the right subclavicular area.  We dissected the skin, subcutaneous tissue and dissected through the muscle and we placed a retractor.  We further dissected down to expose the right subclavian artery.  We looped both proximal and distal segment of the right subclavian artery.  We gave 5000 units of heparin.  We made a 6-mm anterior wall arteriotomy on the right subclavian artery.  We brought in an 8 mm Gelweave vascular graft to perform an end-to-side anastomosis.  We used a 6-0 Prolene suture to conduct continuous running anastomosis of the graft to the arteriotomy.  The hemostasis is satisfactory.  We then filled the vascular graft and tunneled the vascular graft out through a separate stab wound in the right upper chest.      We then performed fluroscopy.  We brought in a 40 cc IABP catheter.  We first inserted the  intraaortic balloon pump catheter transducer sheath into the vascular graft and tired the graft on the sheath.   We buried the vascular graft and transducer sheath in the subcutaneous track and secured the sheath with the tie of the graft.  Under the fluoroscopic guidance, we inserted a guidewire into the right subclavian artery via the IABP introducer sheath.   We advanced the guidewire to reach the middistal descending thoracic aorta.   We then brought in the intraaortic balloon pump catheter and advanced the intraaortic balloon pump catheter over the guidewire.  Under fluoroscopy the guidewire and the IABP catheter reached the optimal position.  We removed the guidewire and anchored the intraaortic balloon pump catheter to the skin exit.  We connected intraaortic balloon pump to the machine and initiated intraaortic pulsation.  We noticed good balloon augmentation waveform and the hemodynamics appears improved with this pump augmentation.       We closed the right subclavicular muscle fascia and subcutaneous tissue and skin with the Vicryl sutures.  The hemostasis is ensured and satisfactory.      We then stopped the transfemoral intraaortic balloon pump.We dissected out the femoral intraaortic balloon pump catheter insertion site.  We cut the anchoring sutures.  We deflated the transfemoral intraaortic balloon pump balloon and pulled out the femoral intraaortic balloon pump without difficulty.   We applied pressure to control the bleeding site of the insertion for 5 minutes and subsequently placed a FemoStop to the insertion site.  The patient tolerated the procedure well and he was transferred to the surgical intensive care unit.         ALVARO RODRIGUEZ MD             D: 2017 11:25   T: 2017 12:05   MT: ENDER      Name:     SYDNIE SIERRA   MRN:      [id number]        Account:        [id number]   :      1965           Procedure Date: 2017      Document: [id number]       cc: Vikram Jean-Baptiste MD        [facility] Surgery Billing

## 2017-04-19 NOTE — BRIEF OP NOTE
[facility], [place]    Brief Operative Note    Pre-operative diagnosis: Heart Failure   Post-operative diagnosis * No post-op diagnosis entered *  Procedure: Procedure(s):  Right Subclavian Intra Aortic Balloon Pump Insertion using Maquet 40cc Ballon Catheter, Implentation of 8mm Gelweave Woven Vascular Prosthesis, Removal of Left Femoral Ballon Pump Catheter, Flouroscopy - Wound Class: I-Clean  Surgeon: Surgeon(s) and Role:     * Keshav Leung MD - Primary     * Pablo Edwards MD - Resident - Assisting     * Branden Xavier MD - Assisting  Anesthesia: General   Estimated blood loss: 100 ml  Drains:  None  Specimens: * No specimens in log *  Findings:   IABP placed in R subclavian artery. Removed R femoral IABP.  Complications: None.  Implants: IABP placed in R subclavian artery.      Removed R fem IABP at 11:12. Held pressure for 5 minutes and applied fem stop. Okay to remove fem stop after 30 minutes total (~11:45AM).  AXR to confirm NJ remains post-pyloric    Defer to primary team for further cares and weaning of vent    Pablo Edwards MD (PGY-3)  General Surgery  Pager #[phone]

## 2017-04-20 ENCOUNTER — APPOINTMENT (OUTPATIENT)
Dept: GENERAL RADIOLOGY | Facility: CLINIC | Age: 52
DRG: 228 | End: 2017-04-20
Attending: INTERNAL MEDICINE
Payer: COMMERCIAL

## 2017-04-20 ENCOUNTER — OFFICE VISIT (OUTPATIENT)
Dept: INTERPRETER SERVICES | Facility: CLINIC | Age: 52
End: 2017-04-20

## 2017-04-20 ENCOUNTER — APPOINTMENT (OUTPATIENT)
Dept: SPEECH THERAPY | Facility: CLINIC | Age: 52
DRG: 228 | End: 2017-04-20
Attending: INTERNAL MEDICINE
Payer: COMMERCIAL

## 2017-04-20 ENCOUNTER — APPOINTMENT (OUTPATIENT)
Dept: OCCUPATIONAL THERAPY | Facility: CLINIC | Age: 52
DRG: 228 | End: 2017-04-20
Attending: INTERNAL MEDICINE
Payer: COMMERCIAL

## 2017-04-20 LAB
ABO + RH BLD: NORMAL
ABO + RH BLD: NORMAL
ALBUMIN SERPL-MCNC: 2.1 G/DL (ref 3.4–5)
ALBUMIN UR-MCNC: 30 MG/DL
ALP SERPL-CCNC: 57 U/L (ref 40–150)
ALT SERPL W P-5'-P-CCNC: 43 U/L (ref 0–70)
ANION GAP SERPL CALCULATED.3IONS-SCNC: 8 MMOL/L (ref 3–14)
ANION GAP SERPL CALCULATED.3IONS-SCNC: 8 MMOL/L (ref 3–14)
APPEARANCE UR: ABNORMAL
AST SERPL W P-5'-P-CCNC: 27 U/L (ref 0–45)
BACTERIA SPEC CULT: NORMAL
BASE EXCESS BLDV CALC-SCNC: 2.5 MMOL/L
BASE EXCESS BLDV CALC-SCNC: 2.7 MMOL/L
BASE EXCESS BLDV CALC-SCNC: 4.3 MMOL/L
BASE EXCESS BLDV CALC-SCNC: 4.4 MMOL/L
BASE EXCESS BLDV CALC-SCNC: 5.2 MMOL/L
BASE EXCESS BLDV CALC-SCNC: 5.7 MMOL/L
BILIRUB DIRECT SERPL-MCNC: 0.2 MG/DL (ref 0–0.2)
BILIRUB SERPL-MCNC: 0.4 MG/DL (ref 0.2–1.3)
BILIRUB UR QL STRIP: NEGATIVE
BLD GP AB SCN SERPL QL: NORMAL
BLOOD BANK CMNT PATIENT-IMP: NORMAL
BUN SERPL-MCNC: 19 MG/DL (ref 7–30)
BUN SERPL-MCNC: 22 MG/DL (ref 7–30)
CA-I BLD-MCNC: 4.4 MG/DL (ref 4.4–5.2)
CALCIUM SERPL-MCNC: 7.6 MG/DL (ref 8.5–10.1)
CALCIUM SERPL-MCNC: 7.7 MG/DL (ref 8.5–10.1)
CHLORIDE SERPL-SCNC: 108 MMOL/L (ref 94–109)
CHLORIDE SERPL-SCNC: 110 MMOL/L (ref 94–109)
CO2 SERPL-SCNC: 26 MMOL/L (ref 20–32)
CO2 SERPL-SCNC: 27 MMOL/L (ref 20–32)
COLOR UR AUTO: YELLOW
CREAT SERPL-MCNC: 0.99 MG/DL (ref 0.66–1.25)
CREAT SERPL-MCNC: 1.04 MG/DL (ref 0.66–1.25)
ERYTHROCYTE [DISTWIDTH] IN BLOOD BY AUTOMATED COUNT: 17.9 % (ref 10–15)
ERYTHROCYTE [DISTWIDTH] IN BLOOD BY AUTOMATED COUNT: 18 % (ref 10–15)
GFR SERPL CREATININE-BSD FRML MDRD: 76 ML/MIN/1.7M2
GFR SERPL CREATININE-BSD FRML MDRD: 80 ML/MIN/1.7M2
GLUCOSE BLDC GLUCOMTR-MCNC: 123 MG/DL (ref 70–99)
GLUCOSE BLDC GLUCOMTR-MCNC: 123 MG/DL (ref 70–99)
GLUCOSE BLDC GLUCOMTR-MCNC: 127 MG/DL (ref 70–99)
GLUCOSE BLDC GLUCOMTR-MCNC: 127 MG/DL (ref 70–99)
GLUCOSE BLDC GLUCOMTR-MCNC: 131 MG/DL (ref 70–99)
GLUCOSE BLDC GLUCOMTR-MCNC: 131 MG/DL (ref 70–99)
GLUCOSE BLDC GLUCOMTR-MCNC: 146 MG/DL (ref 70–99)
GLUCOSE BLDC GLUCOMTR-MCNC: 147 MG/DL (ref 70–99)
GLUCOSE BLDC GLUCOMTR-MCNC: 156 MG/DL (ref 70–99)
GLUCOSE BLDC GLUCOMTR-MCNC: 159 MG/DL (ref 70–99)
GLUCOSE BLDC GLUCOMTR-MCNC: 166 MG/DL (ref 70–99)
GLUCOSE BLDC GLUCOMTR-MCNC: 170 MG/DL (ref 70–99)
GLUCOSE BLDC GLUCOMTR-MCNC: 188 MG/DL (ref 70–99)
GLUCOSE BLDC GLUCOMTR-MCNC: 190 MG/DL (ref 70–99)
GLUCOSE BLDC GLUCOMTR-MCNC: 254 MG/DL (ref 70–99)
GLUCOSE BLDC GLUCOMTR-MCNC: 99 MG/DL (ref 70–99)
GLUCOSE SERPL-MCNC: 128 MG/DL (ref 70–99)
GLUCOSE SERPL-MCNC: 219 MG/DL (ref 70–99)
GLUCOSE UR STRIP-MCNC: 300 MG/DL
HCO3 BLDV-SCNC: 27 MMOL/L (ref 21–28)
HCO3 BLDV-SCNC: 27 MMOL/L (ref 21–28)
HCO3 BLDV-SCNC: 29 MMOL/L (ref 21–28)
HCO3 BLDV-SCNC: 29 MMOL/L (ref 21–28)
HCO3 BLDV-SCNC: 30 MMOL/L (ref 21–28)
HCO3 BLDV-SCNC: 30 MMOL/L (ref 21–28)
HCT VFR BLD AUTO: 25.5 % (ref 40–53)
HCT VFR BLD AUTO: 26.8 % (ref 40–53)
HGB BLD-MCNC: 7.9 G/DL (ref 13.3–17.7)
HGB BLD-MCNC: 7.9 G/DL (ref 13.3–17.7)
HGB UR QL STRIP: ABNORMAL
INR PPP: 1.25 (ref 0.86–1.14)
INTERPRETATION ECG - MUSE: NORMAL
KETONES UR STRIP-MCNC: NEGATIVE MG/DL
LEUKOCYTE ESTERASE UR QL STRIP: ABNORMAL
LMWH PPP CHRO-ACNC: 0.16 IU/ML
Lab: NORMAL
MAGNESIUM SERPL-MCNC: 2 MG/DL (ref 1.6–2.3)
MAGNESIUM SERPL-MCNC: 2.3 MG/DL (ref 1.6–2.3)
MAGNESIUM SERPL-MCNC: 2.4 MG/DL (ref 1.6–2.3)
MAGNESIUM SERPL-MCNC: NORMAL MG/DL (ref 1.6–2.3)
MCH RBC QN AUTO: 29.6 PG (ref 26.5–33)
MCH RBC QN AUTO: 31 PG (ref 26.5–33)
MCHC RBC AUTO-ENTMCNC: 29.5 G/DL (ref 31.5–36.5)
MCHC RBC AUTO-ENTMCNC: 31 G/DL (ref 31.5–36.5)
MCV RBC AUTO: 100 FL (ref 78–100)
MCV RBC AUTO: 100 FL (ref 78–100)
MICRO REPORT STATUS: NORMAL
NITRATE UR QL: NEGATIVE
O2/TOTAL GAS SETTING VFR VENT: ABNORMAL %
O2/TOTAL GAS SETTING VFR VENT: NORMAL %
O2/TOTAL GAS SETTING VFR VENT: NORMAL %
OXYHGB MFR BLDV: 45 %
OXYHGB MFR BLDV: 50 %
OXYHGB MFR BLDV: 50 %
OXYHGB MFR BLDV: 51 %
OXYHGB MFR BLDV: 53 %
OXYHGB MFR BLDV: 58 %
PCO2 BLDV: 40 MM HG (ref 40–50)
PCO2 BLDV: 41 MM HG (ref 40–50)
PCO2 BLDV: 42 MM HG (ref 40–50)
PCO2 BLDV: 44 MM HG (ref 40–50)
PH BLDV: 7.42 PH (ref 7.32–7.43)
PH BLDV: 7.42 PH (ref 7.32–7.43)
PH BLDV: 7.44 PH (ref 7.32–7.43)
PH BLDV: 7.44 PH (ref 7.32–7.43)
PH BLDV: 7.45 PH (ref 7.32–7.43)
PH BLDV: 7.48 PH (ref 7.32–7.43)
PH UR STRIP: 5 PH (ref 5–7)
PHOSPHATE SERPL-MCNC: 2 MG/DL (ref 2.5–4.5)
PHOSPHATE SERPL-MCNC: 2.8 MG/DL (ref 2.5–4.5)
PHOSPHATE SERPL-MCNC: NORMAL MG/DL (ref 2.5–4.5)
PLATELET # BLD AUTO: 181 10E9/L (ref 150–450)
PLATELET # BLD AUTO: 182 10E9/L (ref 150–450)
PO2 BLDV: 26 MM HG (ref 25–47)
PO2 BLDV: 29 MM HG (ref 25–47)
PO2 BLDV: 30 MM HG (ref 25–47)
PO2 BLDV: 30 MM HG (ref 25–47)
PO2 BLDV: 31 MM HG (ref 25–47)
PO2 BLDV: 34 MM HG (ref 25–47)
POTASSIUM BLD-SCNC: 3.5 MMOL/L (ref 3.4–5.3)
POTASSIUM SERPL-SCNC: 3.4 MMOL/L (ref 3.4–5.3)
POTASSIUM SERPL-SCNC: 3.8 MMOL/L (ref 3.4–5.3)
POTASSIUM SERPL-SCNC: NORMAL MMOL/L (ref 3.4–5.3)
PROT SERPL-MCNC: 6.4 G/DL (ref 6.8–8.8)
RBC # BLD AUTO: 2.55 10E12/L (ref 4.4–5.9)
RBC # BLD AUTO: 2.67 10E12/L (ref 4.4–5.9)
RBC #/AREA URNS AUTO: 55 /HPF (ref 0–2)
SODIUM SERPL-SCNC: 141 MMOL/L (ref 133–144)
SODIUM SERPL-SCNC: 145 MMOL/L (ref 133–144)
SP GR UR STRIP: 1.02 (ref 1–1.03)
SPECIMEN EXP DATE BLD: NORMAL
SPECIMEN SOURCE: NORMAL
TROPONIN I SERPL-MCNC: 0.46 UG/L (ref 0–0.04)
TROPONIN I SERPL-MCNC: 0.46 UG/L (ref 0–0.04)
TROPONIN I SERPL-MCNC: 0.49 UG/L (ref 0–0.04)
URN SPEC COLLECT METH UR: ABNORMAL
UROBILINOGEN UR STRIP-MCNC: NORMAL MG/DL (ref 0–2)
WBC # BLD AUTO: 10.7 10E9/L (ref 4–11)
WBC # BLD AUTO: 9.7 10E9/L (ref 4–11)
WBC #/AREA URNS AUTO: 73 /HPF (ref 0–2)

## 2017-04-20 PROCEDURE — 25000132 ZZH RX MED GY IP 250 OP 250 PS 637

## 2017-04-20 PROCEDURE — 82805 BLOOD GASES W/O2 SATURATION: CPT | Performed by: INTERNAL MEDICINE

## 2017-04-20 PROCEDURE — 40000196 ZZH STATISTIC RAPCV CVP MONITORING

## 2017-04-20 PROCEDURE — 84100 ASSAY OF PHOSPHORUS: CPT

## 2017-04-20 PROCEDURE — 25000128 H RX IP 250 OP 636: Performed by: STUDENT IN AN ORGANIZED HEALTH CARE EDUCATION/TRAINING PROGRAM

## 2017-04-20 PROCEDURE — 84484 ASSAY OF TROPONIN QUANT: CPT | Performed by: INTERNAL MEDICINE

## 2017-04-20 PROCEDURE — 99211 OFF/OP EST MAY X REQ PHY/QHP: CPT

## 2017-04-20 PROCEDURE — 80076 HEPATIC FUNCTION PANEL: CPT | Performed by: INTERNAL MEDICINE

## 2017-04-20 PROCEDURE — 86901 BLOOD TYPING SEROLOGIC RH(D): CPT | Performed by: STUDENT IN AN ORGANIZED HEALTH CARE EDUCATION/TRAINING PROGRAM

## 2017-04-20 PROCEDURE — 40000076 ZZH STATISTIC IABP MONITORING

## 2017-04-20 PROCEDURE — 40000133 ZZH STATISTIC OT WARD VISIT

## 2017-04-20 PROCEDURE — 71010 XR CHEST PORT 1 VW: CPT

## 2017-04-20 PROCEDURE — 25000132 ZZH RX MED GY IP 250 OP 250 PS 637: Performed by: INTERNAL MEDICINE

## 2017-04-20 PROCEDURE — 83735 ASSAY OF MAGNESIUM: CPT | Performed by: INTERNAL MEDICINE

## 2017-04-20 PROCEDURE — 25000125 ZZHC RX 250: Performed by: INTERNAL MEDICINE

## 2017-04-20 PROCEDURE — 84484 ASSAY OF TROPONIN QUANT: CPT

## 2017-04-20 PROCEDURE — 85520 HEPARIN ASSAY: CPT | Performed by: INTERNAL MEDICINE

## 2017-04-20 PROCEDURE — T1013 SIGN LANG/ORAL INTERPRETER: HCPCS | Mod: U3

## 2017-04-20 PROCEDURE — 84132 ASSAY OF SERUM POTASSIUM: CPT | Performed by: INTERNAL MEDICINE

## 2017-04-20 PROCEDURE — 80048 BASIC METABOLIC PNL TOTAL CA: CPT | Performed by: INTERNAL MEDICINE

## 2017-04-20 PROCEDURE — 81001 URINALYSIS AUTO W/SCOPE: CPT

## 2017-04-20 PROCEDURE — 93005 ELECTROCARDIOGRAM TRACING: CPT

## 2017-04-20 PROCEDURE — 27210195 ZZH KIT POWER PICC DOUBLE LUMEN

## 2017-04-20 PROCEDURE — 85027 COMPLETE CBC AUTOMATED: CPT

## 2017-04-20 PROCEDURE — 93010 ELECTROCARDIOGRAM REPORT: CPT | Performed by: INTERNAL MEDICINE

## 2017-04-20 PROCEDURE — 40000556 ZZH STATISTIC PERIPHERAL IV START W US GUIDANCE

## 2017-04-20 PROCEDURE — 25000132 ZZH RX MED GY IP 250 OP 250 PS 637: Performed by: STUDENT IN AN ORGANIZED HEALTH CARE EDUCATION/TRAINING PROGRAM

## 2017-04-20 PROCEDURE — 85610 PROTHROMBIN TIME: CPT | Performed by: INTERNAL MEDICINE

## 2017-04-20 PROCEDURE — 87086 URINE CULTURE/COLONY COUNT: CPT

## 2017-04-20 PROCEDURE — 25000125 ZZHC RX 250

## 2017-04-20 PROCEDURE — 40000048 ZZH STATISTIC DAILY SWAN MONITORING

## 2017-04-20 PROCEDURE — 82330 ASSAY OF CALCIUM: CPT | Performed by: INTERNAL MEDICINE

## 2017-04-20 PROCEDURE — 86850 RBC ANTIBODY SCREEN: CPT | Performed by: STUDENT IN AN ORGANIZED HEALTH CARE EDUCATION/TRAINING PROGRAM

## 2017-04-20 PROCEDURE — 40000014 ZZH STATISTIC ARTERIAL MONITORING DAILY

## 2017-04-20 PROCEDURE — 99291 CRITICAL CARE FIRST HOUR: CPT | Mod: GC | Performed by: INTERNAL MEDICINE

## 2017-04-20 PROCEDURE — 85027 COMPLETE CBC AUTOMATED: CPT | Performed by: INTERNAL MEDICINE

## 2017-04-20 PROCEDURE — 00000146 ZZHCL STATISTIC GLUCOSE BY METER IP

## 2017-04-20 PROCEDURE — 40000275 ZZH STATISTIC RCP TIME EA 10 MIN

## 2017-04-20 PROCEDURE — 40000225 ZZH STATISTIC SLP WARD VISIT: Performed by: SPEECH-LANGUAGE PATHOLOGIST

## 2017-04-20 PROCEDURE — 97535 SELF CARE MNGMENT TRAINING: CPT | Mod: GO

## 2017-04-20 PROCEDURE — 27210437 ZZH NUTRITION PRODUCT SEMIELEM INTERMED LITER

## 2017-04-20 PROCEDURE — 25000128 H RX IP 250 OP 636

## 2017-04-20 PROCEDURE — 83735 ASSAY OF MAGNESIUM: CPT

## 2017-04-20 PROCEDURE — 86900 BLOOD TYPING SEROLOGIC ABO: CPT | Performed by: STUDENT IN AN ORGANIZED HEALTH CARE EDUCATION/TRAINING PROGRAM

## 2017-04-20 PROCEDURE — 92526 ORAL FUNCTION THERAPY: CPT | Mod: GN | Performed by: SPEECH-LANGUAGE PATHOLOGIST

## 2017-04-20 PROCEDURE — 25000125 ZZHC RX 250: Performed by: STUDENT IN AN ORGANIZED HEALTH CARE EDUCATION/TRAINING PROGRAM

## 2017-04-20 PROCEDURE — 87040 BLOOD CULTURE FOR BACTERIA: CPT

## 2017-04-20 PROCEDURE — 40000940 XR CHEST PORT 1 VW

## 2017-04-20 PROCEDURE — 36569 INSJ PICC 5 YR+ W/O IMAGING: CPT

## 2017-04-20 PROCEDURE — S0171 BUMETANIDE 0.5 MG: HCPCS

## 2017-04-20 PROCEDURE — 20000004 ZZH R&B ICU UMMC

## 2017-04-20 PROCEDURE — 25000128 H RX IP 250 OP 636: Performed by: PEDIATRICS

## 2017-04-20 PROCEDURE — 84100 ASSAY OF PHOSPHORUS: CPT | Performed by: INTERNAL MEDICINE

## 2017-04-20 RX ORDER — LIDOCAINE 40 MG/G
CREAM TOPICAL
Status: DISCONTINUED | OUTPATIENT
Start: 2017-04-20 | End: 2017-05-12 | Stop reason: HOSPADM

## 2017-04-20 RX ORDER — HEPARIN SODIUM 10000 [USP'U]/100ML
0-3500 INJECTION, SOLUTION INTRAVENOUS CONTINUOUS
Status: DISCONTINUED | OUTPATIENT
Start: 2017-04-20 | End: 2017-04-21

## 2017-04-20 RX ORDER — HEPARIN SODIUM,PORCINE 10 UNIT/ML
2-5 VIAL (ML) INTRAVENOUS
Status: DISCONTINUED | OUTPATIENT
Start: 2017-04-20 | End: 2017-05-12 | Stop reason: HOSPADM

## 2017-04-20 RX ORDER — ASPIRIN 81 MG/1
324 TABLET, CHEWABLE ORAL DAILY
Status: DISCONTINUED | OUTPATIENT
Start: 2017-04-21 | End: 2017-04-28

## 2017-04-20 RX ORDER — BUMETANIDE 0.25 MG/ML
3 INJECTION INTRAMUSCULAR; INTRAVENOUS EVERY 6 HOURS
Status: COMPLETED | OUTPATIENT
Start: 2017-04-20 | End: 2017-04-20

## 2017-04-20 RX ORDER — HEPARIN SODIUM,PORCINE 10 UNIT/ML
5-10 VIAL (ML) INTRAVENOUS EVERY 24 HOURS
Status: DISCONTINUED | OUTPATIENT
Start: 2017-04-20 | End: 2017-05-12 | Stop reason: HOSPADM

## 2017-04-20 RX ORDER — HEPARIN SODIUM,PORCINE 10 UNIT/ML
5-10 VIAL (ML) INTRAVENOUS
Status: DISCONTINUED | OUTPATIENT
Start: 2017-04-20 | End: 2017-05-12 | Stop reason: HOSPADM

## 2017-04-20 RX ADMIN — Medication 12.5 MG: at 21:33

## 2017-04-20 RX ADMIN — HYDRALAZINE HYDROCHLORIDE 50 MG: 50 TABLET ORAL at 03:58

## 2017-04-20 RX ADMIN — MILRINONE LACTATE 0.38 MCG/KG/MIN: 200 INJECTION, SOLUTION INTRAVENOUS at 16:10

## 2017-04-20 RX ADMIN — HYDROMORPHONE HYDROCHLORIDE 0.2 MG: 1 INJECTION, SOLUTION INTRAMUSCULAR; INTRAVENOUS; SUBCUTANEOUS at 14:50

## 2017-04-20 RX ADMIN — BUMETANIDE 3 MG: 0.25 INJECTION INTRAMUSCULAR; INTRAVENOUS at 23:59

## 2017-04-20 RX ADMIN — HEPARIN SODIUM 1000 UNITS/HR: 10000 INJECTION, SOLUTION INTRAVENOUS at 06:56

## 2017-04-20 RX ADMIN — HUMAN INSULIN 3 UNITS/HR: 100 INJECTION, SOLUTION SUBCUTANEOUS at 06:24

## 2017-04-20 RX ADMIN — PIPERACILLIN AND TAZOBACTAM 4.5 G: 4; .5 INJECTION, POWDER, FOR SOLUTION INTRAVENOUS at 16:44

## 2017-04-20 RX ADMIN — Medication 12.5 MG: at 22:37

## 2017-04-20 RX ADMIN — PIPERACILLIN AND TAZOBACTAM 4.5 G: 4; .5 INJECTION, POWDER, FOR SOLUTION INTRAVENOUS at 11:01

## 2017-04-20 RX ADMIN — PANTOPRAZOLE SODIUM 40 MG: 40 TABLET, DELAYED RELEASE ORAL at 19:47

## 2017-04-20 RX ADMIN — ACETAMINOPHEN 650 MG: 325 TABLET, FILM COATED ORAL at 03:57

## 2017-04-20 RX ADMIN — POTASSIUM CHLORIDE 20 MEQ: 29.8 INJECTION, SOLUTION INTRAVENOUS at 05:20

## 2017-04-20 RX ADMIN — MILRINONE LACTATE 0.38 MCG/KG/MIN: 200 INJECTION, SOLUTION INTRAVENOUS at 01:41

## 2017-04-20 RX ADMIN — VANCOMYCIN HYDROCHLORIDE 1250 MG: 10 INJECTION, POWDER, LYOPHILIZED, FOR SOLUTION INTRAVENOUS at 23:33

## 2017-04-20 RX ADMIN — PIPERACILLIN AND TAZOBACTAM 4.5 G: 4; .5 INJECTION, POWDER, FOR SOLUTION INTRAVENOUS at 03:54

## 2017-04-20 RX ADMIN — Medication 7 ML: at 08:07

## 2017-04-20 RX ADMIN — PANTOPRAZOLE SODIUM 40 MG: 40 TABLET, DELAYED RELEASE ORAL at 08:07

## 2017-04-20 RX ADMIN — ISOSORBIDE DINITRATE 10 MG: 10 TABLET ORAL at 01:17

## 2017-04-20 RX ADMIN — VANCOMYCIN HYDROCHLORIDE 1250 MG: 10 INJECTION, POWDER, LYOPHILIZED, FOR SOLUTION INTRAVENOUS at 12:06

## 2017-04-20 RX ADMIN — ATORVASTATIN CALCIUM 40 MG: 40 TABLET, FILM COATED ORAL at 19:46

## 2017-04-20 RX ADMIN — Medication 220 MG: at 08:07

## 2017-04-20 RX ADMIN — HYDROMORPHONE HYDROCHLORIDE 0.5 MG: 1 INJECTION, SOLUTION INTRAMUSCULAR; INTRAVENOUS; SUBCUTANEOUS at 01:36

## 2017-04-20 RX ADMIN — HUMAN INSULIN 7 UNITS/HR: 100 INJECTION, SOLUTION SUBCUTANEOUS at 14:21

## 2017-04-20 RX ADMIN — OXYCODONE HYDROCHLORIDE 5 MG: 5 TABLET ORAL at 21:33

## 2017-04-20 RX ADMIN — POTASSIUM CHLORIDE 20 MEQ: 29.8 INJECTION, SOLUTION INTRAVENOUS at 18:11

## 2017-04-20 RX ADMIN — MULTIVIT AND MINERALS-FERROUS GLUCONATE 9 MG IRON/15 ML ORAL LIQUID 15 ML: at 08:06

## 2017-04-20 RX ADMIN — LIDOCAINE HYDROCHLORIDE 2 ML: 20 INJECTION, SOLUTION INFILTRATION; PERINEURAL at 15:44

## 2017-04-20 RX ADMIN — POTASSIUM CHLORIDE 20 MEQ: 1.5 POWDER, FOR SOLUTION ORAL at 21:33

## 2017-04-20 RX ADMIN — Medication 2 G: at 23:06

## 2017-04-20 RX ADMIN — ACETAMINOPHEN 650 MG: 325 TABLET, FILM COATED ORAL at 12:07

## 2017-04-20 RX ADMIN — BUMETANIDE 3 MG: 0.25 INJECTION INTRAMUSCULAR; INTRAVENOUS at 18:23

## 2017-04-20 RX ADMIN — PIPERACILLIN AND TAZOBACTAM 4.5 G: 4; .5 INJECTION, POWDER, FOR SOLUTION INTRAVENOUS at 22:04

## 2017-04-20 RX ADMIN — Medication 7 ML: at 19:47

## 2017-04-20 RX ADMIN — HUMAN INSULIN 8 UNITS/HR: 100 INJECTION, SOLUTION SUBCUTANEOUS at 18:17

## 2017-04-20 RX ADMIN — ASPIRIN 81 MG CHEWABLE TABLET 81 MG: 81 TABLET CHEWABLE at 08:06

## 2017-04-20 RX ADMIN — HEPARIN SODIUM 750 UNITS/HR: 10000 INJECTION, SOLUTION INTRAVENOUS at 20:10

## 2017-04-20 ASSESSMENT — PAIN DESCRIPTION - DESCRIPTORS
DESCRIPTORS: SORE
DESCRIPTORS: ACHING
DESCRIPTORS: ACHING

## 2017-04-20 NOTE — PHARMACY-VANCOMYCIN DOSING SERVICE
Pharmacy Vancomycin Initial Note  Date of Service 2017  Patient's  1965  51 year old, male    Indication: Sepsis    Current estimated CrCl = Estimated Creatinine Clearance: 74.3 mL/min (based on Cr of 1.02).    Creatinine for last 3 days  2017:  4:04 AM Creatinine 1.16 mg/dL;  3:49 PM Creatinine 0.99 mg/dL  2017:  3:29 AM Creatinine 1.04 mg/dL;  2:34 PM Creatinine 1.04 mg/dL  2017:  3:40 AM Creatinine 1.08 mg/dL; 11:59 AM Creatinine 1.07 mg/dL;  4:03 PM Creatinine 1.02 mg/dL    Recent Vancomycin Level(s) for last 3 days  No results found for requested labs within last 72 hours.      Vancomycin IV Administrations (past 72 hours)      No vancomycin orders with administrations in past 72 hours.                Nephrotoxins and other renal medications (Future)    Start     Dose/Rate Route Frequency Ordered Stop    17 1030  vancomycin (VANCOCIN) 1,250 mg in NaCl 0.9 % 250 mL intermittent infusion      1,250 mg Intravenous EVERY 12 HOURS 178      17 2230  vancomycin (VANCOCIN) 1,500 mg in NaCl 0.9 % 250 mL intermittent infusion      1,500 mg Intravenous ONCE 17 2200  piperacillin-tazobactam (ZOSYN) 4.5 g vial to attach to  mL bag      4.5 g  over 1 Hours Intravenous EVERY 6 HOURS 17 2149            Contrast Orders - past 72 hours     None                Plan:  1.  Start vancomycin  1500 mg X1 then 1250 mg IV q12h.   2.  Goal Trough Level: 15-20 mg/L   3.  Pharmacy will check trough levels as appropriate in 1-3 Days.    4. Serum creatinine levels will be ordered daily for the first week of therapy and at least twice weekly for subsequent weeks.    5. [place] method utilized to dose vancomycin therapy: Method 1    Saman Covarrubias

## 2017-04-20 NOTE — PROGRESS NOTES
04/20/17 1600   Visit Information   Visit Made By Staff    Type of Visit Follow-up;Palliative Consult Service   Distress Christianity/Spiritual   Visited Patient;Family   Interventions   Plan of Care Review   With patient/family/proxy   Basic Spiritual Interventions   Assessment of spiritual needs/resources;Prayer   Provision of Spiritual Resources  Sacred text   Advanced Assessments/Interventions   Presenting Concerns/Issues Spiritual/Christianity/emotional support;Stress/self-care   Healing Modalities Provided Other  (Samaritan chanting)   SPIRITUAL HEALTH SERVICES  SPIRITUAL ASSESSMENT Progress Note (Palliative Focus)  [facility] 4E    As promised, returned today to [facility] for Bong.  His dtr from [place] was feeding him when I arrived.  He was more alert and said that he had more energy.  As requested, I chanted the Elise Martinez nine times for Bong.  His dtr said that she plans to stay with him as long as he is in hospital.    Will let unit  know of visit.        Rev. Patsy Lowe  Staff   Spiritual Health Service  [facility]  Pager [phone]  Office [phone]

## 2017-04-20 NOTE — PROGRESS NOTES
"[facility]  Cardiology II Service / Advanced Heart Failure        Interval History:   - s/p SC IABP placement on 4/19  - Spiked a fever overnight, complaining of pain at the PICC site on Rt arm  - Normal WBC count, hemodynamics still more consistent with a cardiogenic etiology than distributive      Intake/Output Summary (Last 24 hours) at 04/20/17 1431  Last data filed at 04/20/17 1400   Gross per 24 hour   Intake          2850.17 ml   Output             1885 ml   Net           965.17 ml           Pertinent Medications:  I have reviewed this patient's current medications      [START ON 4/21/2017] aspirin  324 mg Oral or Feeding Tube Daily     QUEtiapine  12.5 mg Oral At Bedtime     piperacillin-tazobactam  4.5 g Intravenous Q6H     vancomycin (VANCOCIN) IV  1,250 mg Intravenous Q12H     pantoprazole  40 mg Per Feeding Tube BID     zinc sulfate (20 mg [place]. Zn/mL)  220 mg Per Feeding Tube Daily     vitamin A-D & C drops  7 mL Per Feeding Tube BID     atorvastatin  40 mg Oral or Feeding Tube Daily at 8 pm     pneumococcal vaccine  0.5 mL Intramuscular Once     sodium chloride (PF)  3 mL Intracatheter Q8H     multivitamins with minerals  15 mL Per Feeding Tube Daily         Examination:  /79 (BP Location: Left arm)  Temp 100.1  F (37.8  C) (Oral)  Resp 19  Ht 1.575 m (5' 2.01\")  Wt 59 kg (130 lb 1.1 oz)  SpO2 99%  BMI 23.78 kg/m2  GEN: A, cooperative and conversational   NECK: can not assess JVP   RESP: crackles   CV: S1S2S3, holossystolic murmur at the apex  ABD: Soft, nontender to palpation, no HSM, +BS, no guarding  EXT: No peripheral edema, warm/well perfused   NEURO: CN II-XII intact, normal 5/5 strength in all extremitites  SKIN: Normal skin turgor, no rash on limited exam    Data:  CMP  Recent Labs  Lab 04/20/17  0345 04/19/17  1603 04/19/17  1159 04/19/17  0903 04/19/17  0340 04/18/17  2349  04/18/17  0329  04/17/17  0404   * 142 142 142 143  --   < > " 142  < > 145*   POTASSIUM 3.8 3.6 3.9 3.7 3.9 3.5  < > 3.8  < > 3.8   CHLORIDE 110* 106 108  --  107  --   < > 107  < > 108   CO2 27 28 25  --  27  --   < > 26  < > 30   ANIONGAP 8 8 9  --  9  --   < > 10  < > 8   * 173* 140* 107* 167*  --   < > 150*  < > 99   BUN 22 22 23  --  24  --   < > 23  < > 24   CR 1.04 1.02 1.07  --  1.08  --   < > 1.04  < > 1.16   GFRESTIMATED 76 77 73  --  72  --   < > 76  < > 67   GFRESTBLACK >90African American GFR Calc >90African American GFR Calc 89  --  88  --   < > >90African American GFR Calc  < > 81   ROB 7.7* 7.5* 7.3*  --  8.2*  --   < > 6.9*  < > 7.0*   MAG 2.4* 2.3 2.5*  --  2.0 2.2  < > 2.1  < > 2.5*   PHOS 2.8  --  3.6  --  2.8 3.2  < > 2.4*  < >  --    PROTTOTAL 6.4*  --   --   --  6.4*  --   --  6.4*  --  6.5*   ALBUMIN 2.1*  --   --   --  2.2*  --   --  2.1*  --  2.2*   BILITOTAL 0.4  --   --   --  0.5  --   --  0.6  --  0.6   ALKPHOS 57  --   --   --  63  --   --  64  --  55   AST 27  --   --   --  40  --   --  20  --  11   ALT 43  --   --   --  51  --   --  38  --  36   < > = values in this interval not displayed.  CBC    Recent Labs  Lab 04/20/17  0345 04/19/17  2150 04/19/17  1159 04/19/17  0903 04/19/17  0340 04/18/17  0329   WBC 9.7  --  8.3  --  6.4 7.1   RBC 2.67*  --  2.75*  --  2.89* 2.67*   HGB 7.9* 8.2* 8.3* 8.2* 8.7* 8.0*   HCT 26.8*  --  27.2*  --  28.3* 26.4*     --  99  --  98 99   MCH 29.6  --  30.2  --  30.1 30.0   MCHC 29.5*  --  30.5*  --  30.7* 30.3*   RDW 18.0*  --  17.6*  --  17.8* 18.0*     --  157  --  153 136*     INR    Recent Labs  Lab 04/20/17  0345 04/19/17  1159   INR 1.25* 1.23*     Arterial Blood Gas    Recent Labs  Lab 04/20/17  0828 04/20/17  0345 04/19/17  2356 04/19/17  2043  04/19/17  1603 04/19/17  1421 04/19/17  1157 04/19/17  0903   PH  --   --   --   --   --  7.49* 7.52* 7.45 7.46*   PCO2  --   --   --   --   --  38 34* 38 39   PO2  --   --   --   --   --  125* 135* 310* 385*   HCO3  --   --   --   --   --   29* 28 26 28   O2PER 2L 2LNC 2LNC 3L  < > 3l 40 100  100 100.0   < > = values in this interval not displayed.    Imaging Studies:  Echo 3/27  The visual ejection fraction is estimated at 30-35%.  There is extensive inferior, inferoseptal, and inferolateral wall akinesis.  Basal/mid lateral wall hypokinesis  There is moderate to severe septal hypokinesis.  Septal wall motion abnormality may reflect pacemaker activation.  There is a catheter/pacemaker lead seen in the right ventricle.  The right ventricle is mildly dilated.  Severely decreased right ventricular systolic function  There is no pericardial effusion.  The rhythm was paced.  Compared to the prior study, the LVEF appears somewhat decreased. Septal wall  motion abnormality larger- may reflect, in part, that patient in sinus tach on  prior study, and ventricular paced now. No hemodynamically significant  valvular abnormalities on 2D or color flow imaging     CT chest/abdomen 3/27   IMPRESSION:   1. Small mediastinal hemorrhage, small bilateral pleural effusions  which may be hemorrhagic. Small intraperitoneal hemorrhage. Minimal  pericardial hemorrhage.  2. Minimal pneumoperitoneum in the left paracolic gutter, of unknown  significance. No pneumatosis as clinically questioned.  3. IABP slightly low in position.  4. Bilateral pulmonary dependent consolidations represent compression  atelectasis, however differentials include aspiration.     Cath 3/26 [facility]  SUMMARY:   --Inferior STEMI w/ late presentation. Culprit dictated by complete  heart block, and cardiogenic shock. Emergent echo in the Cath Lab also  shows significant RV dysfunction.  --Three vessel coronary artery disease with diffuse coronary disease  out to the distal vessels  --Successful POBA of the prox and mid-RCA. Stent was not placed, in  anticipation he may need to be considered for bypass surgery in the  near future  --Insertion of a temporary pacemaker for bradycardia (AVB)  and  hypotension  --Insertion of a IABP  --Upper GI bleed consistent with [place]     Cath [place] 3/27     CT head 3/28: normal      Echo 3/29  Interpretation Summary  Limited study. Ischemic CM. Moderately (EF 30-35%) reduced left ventricular  function is present. Traced at 32%.  Posterior wall akinesis is present. Lateral wall akinesis is present. Inferior  wall akinesis is present.  Right ventricular function, chamber size, wall motion, and thickness are  normal.  Dilation of the inferior vena cava is present with abnormal respiratory  variation in diameter.     Compared to prior study, RV fxn is improved.     Echo 3/31  Left Ventricle  The Ejection Fraction is estimated at 50-55%. Inferior,posterior,lateral,basal  septal wall akinesis as reported before. No change.     CORONARY ANGIOGRAM 4/1:   1. Both coronary arteries arise from their respective cusps.  2. Right dominant.  3. LM has mild luminal irregularities.   4. LAD supplies the apex along with the RCA (type 2 LAD) and gives rise to septal perforators and a large and branching D1. There are widely patent stents extending from the proximal to mid LAD. The dLAD has mild to moderate disease to 30% stenosis. The D1 is jailed by the LAD stents, but has EBER 3 flow with disease to 30% stenosis.   5. The small ramus is no longer present.  6. LCX is occluded at the ostium.   7. RCA gives rise to PL branches and supplies PDA. There are widely patent stents extending from the proximal to mid RCA. The remainder of the mRCA has disease to 50% stenosis and the dRCA has disease to 10% stenosis. The RPDA has a widely patent stent and the remainder of the artery has mild disease to 10% stenosis. The RPLA has small vessels with diffuse disease including a distal branch occlusion. The RPLA supplies some small collateral branches to the dLCx.      COMPLICATIONS:  1. None      SUMMARY:   1. Cardiogenic shock following an inferior STEMI.  2. The LCx re-occlusion is  not surprising as the recently revascularized LCx  had poor outflow and the revascularized portion did not supply a large territory (limited to no flow was present distal to the stented segment immediately following stenting). Repeat revascularization of the LCx would result in the same outcome of repeat closure and also risk embolizing thrombus from the LCx into the LAD so no treatment of the occluded LCx was performed.   3. Three vessel coronary artery disease with patent LAD and RCA stents and an occluded LCx.     Echo 4/3  Left ventricular size is normal.  The Ejection Fraction is estimated at 35-40%.  Inferior wall akinesis is present.  Lateral wall akinesis is present.  Posterior wall akinesis is present.  Right ventricular function, chamber size, wall motion, and thickness are  normal.  Moderate mitral insufficiency is present.  Moderate tricuspid insufficiency is present.  Right ventricular systolic pressure is 47mmHg above the right atrial pressure.  The inferior vena cava is normal.     Echo 4/5  Moderately (EF 35-40%) reduced left ventricular function is present. Traced at  40%.  Moderate mitral insufficiency is present.  The cause of the mitral insufficiency appears to be restricted posterior  leaflet from posterior MI. No mitral leaflet pathology seen.  Dilation of the inferior vena cava is present with abnormal respiratory  variation in diameter.     CT abd without contrast 4/4  IMPRESSION:   1. No evidence of ischemic bowel, obstruction, or intra-abdominal  infection.  2. Bibasilar patchy pulmonary opacities likely represent combination  of aspiration and atelectasis.  3. Small amount of simple free fluid within the lower abdomen.  4. Small left retroperitoneal hematoma along the iliac vasculature  likely postprocedural.   5. Unchanged size of bilateral pleural effusions, which now consist of  simple fluid.    Assessment and Plan  51 year old man presented with cardiogenic shock from inferior STEMI  s/p RCA aspiration thrombectomy and POBA on 3/26 at [facility] s/p IABP and initiation of Dopamine, also during procedure, CHB s/p temp pacer, emesis/hematmesis and altered mental status s/p intubation transferred here for consideration of mechanical support on 3/27. Had PCI to RCA, LAD and LCx (on ASA and plavix) started on epinephrine in addition to dopamine, post procedure developed distributive shock picture from infection (aspiration pneumonia? Sputum cx growing staph aureus, on vanco and zosyn) vs post-MI SIRS response. Currently in cardiogenic shock with IABP in situ    Card  # Cardiogenic shock 4/3- from underlying 3v CAD-has had high SVR and low CI, complicated by ischemic MR  # Due to inferior wall STEMI. S/P 7 stents to LAD, circ, RCA (angiogram report pending). Now with IABP, temporary pacemaker after 3rd deg heart block in cath lab at [facility] on 3/26   # Small pericardial hemorrhage   - IABP in place  - Hydral 50mg q6H, once his SCr improves will consider starting Nipride for a more stable afterload reduction regimen, and give him an IABP wean trial  - Hold off on isordil for now  - LVAD work up given SCr improving  - Cortisol normal  - Continue Milrinone 0.375  - Plan to keep CVP 9-12 today, net 500 cc to 1 L negative  - obtain blood cultures (peripheral and from his PICC)  - Will d/c SGC and RUE PICC, will obtain new LUE PICC for milrinone inf  - ID consult; given SC IABP and fever overnight  - Will follow up alpha galactosidase levels  - Plan for RHC/HBx tomorrow    # Neuro  - alert, follows commands  - WBC stable   - Seroquel 25 mg         Respiratory  #Intubated for airway protection due to mental status and emesis on 3/26, extubated 4/3  #Probable Aspiration PNA/pneumonitis vs MSSA pneumonia         # ID  - UA 4/15 with cloudy urine, 50 WBCs, many bacteria, no nitrates. UCx grew E coli (sens to CFTX - on tx)  - Fever on 4/20 with blood cultures in process, PICC and SGC removed, started on Vanc  and pip/tazo  - ID consult placed         # Endocrine  T2DM: HA1C 7.5 on admission.  - Insulin gtt per nurising protocol with hypoglycemia precautions.       # GI  - Had several maroon BMs since 4/11, Hb overall dropped by a 1.5 grams or so, heparin inf held and GI consulted  - Colonoscopy 4/17 showed a rectal ulcer but no intervenable lesions  - Will continue to monitor Hb  - Protonix 40 PO BID         # Renal/  Renal function slightly improved- from hypoperfusion from cardiogenic shock 4/5 plus central venous congestion plus contrast nephropathy  -Diuresis  - Daily Cr  - Avoid nephrotoxins as able  - Plan to make 500cc to 1L net negative today     -Hypernatremia  - Resolved. Stopped his D5W, only getting FWF's.        FEN: feeding tube, and dysphagia level II  Code: FULL  PPx: PPI 40mg BID, senna PRN  Dispo: pending stability        Plan of care discussed with Dr. Yue Loving MD  Cardiovascular Disease Fellow  Pager: [phone]

## 2017-04-20 NOTE — ANESTHESIA POSTPROCEDURE EVALUATION
Patient: Luke Henao    Procedure(s):  Right Subclavian Intra Aortic Balloon Pump Insertion using [place] 40cc Ballon Catheter, Implentation of 8mm Gelweave Woven Vascular Prosthesis, Removal of Left Femoral Ballon Pump Catheter, Flouroscopy - Wound Class: I-Clean    Diagnosis:Heart Failure   Diagnosis Additional Information: No value filed.    Anesthesia Type:  No value filed.    Note:  Anesthesia Post Evaluation    Patient location during evaluation: ICU  Patient participation: Unable to evaluate secondary to administered sedation  Level of consciousness: responsive to light touch  Pain management: adequate  Airway patency: patent  Cardiovascular status: acceptable  Respiratory status: ventilator  Hydration status: acceptable  PONV: none             Last vitals:  Vitals:    04/20/17 0400 04/20/17 0500 04/20/17 0600   BP:      Resp: 14 16 14   Temp: (S) 38.7  C (101.7  F)  37.3  C (99.2  F)   SpO2: 98% 97% 98%         Electronically Signed By: Salty Ko MD  April 20, 2017  6:56 AM

## 2017-04-20 NOTE — PLAN OF CARE
Problem: Goal Outcome Summary  Goal: Goal Outcome Summary  Outcome: Declining  D/I:  Heart rate 130-135 continuously from 1800 last evening to 0445 this AM. HR mid to upper 120's rest of shift. Temp 102.2 max @ start of shift. Blankets removed. Room Temp decreased. MD notified of 's and fever. BC x2 drawn. UA reflex sent. Tylenol given. Rocephin D/C'd by MD. Zosyn and Vancomycin ordered and given. Temp down to 99.8 but back up to 101.7 @ 0400. Tylenol given again and temp down to 99.2 @ 0600.  Pt c/o's of new onset of numbness in right thumb and right 1st finger that started on previous shift. Report from previous shift RN stated it started on her shift on return from IABP placement and MD was notified. MD notified again on this shift by writer that numbness continues. New right subclavian IABP 1:1 with IABP MAP's in mid to upper 60's most of shift. Heparin Gtt restarted @ 2200 last evening @ 1000 units/hour with no bolus. Xa level on this mornings AM lab draw resulted with Xa level in goal range. Scheduled Hydralazine and Isordil continues.  Milrinone Gtt continue @ 0.375 mcg/kg/min. Potassium level replaced this AM per ordered protocol. CVP= 13,  14 & 14. PA= 42/30, 46/34 & 48/36. GENE CI= 2.8, 2.3 & 2.5 SVR= 1444, 1295 & 1546. Pt given Dilaudid once this shift after waking up from pain @ new right subclavian IABP skin insertion site. Pt then slept the rest of the shift. Urine output in [place] = 40-50 ml/hr.      A/P: Swallow study ordered for today. Pt kept NPO this shift except for max of 1 ice cube per hour due to significant coughing when taking sips of water on previous shift. Current plan is to monitor hemodynamics closely and notify MD if IABP MAP < 65 for more than a few minutes.

## 2017-04-20 NOTE — PROGRESS NOTES
CLINICAL NUTRITION SERVICES - BRIEF NOTE    -Diet: SLP rec dysphagia diet level 2 with nectar thick liquids.     INTERVENTIONS  Recommendations / Nutrition Prescription  1. Rec begin calorie counts tomorrow x3 days to monitor PO intake and ongoing need for continuous TF v approp to transition to cycled TF. Only if initial calorie count data suggests patient meeting at least 50% of higher end estimated energy and protein needs (840 kcal and 40 g PRO) would then consider running TF (Peptamen 1.5) @ 50 ml/hr over 12 hrs at night.    2. Rec begin Magic Cup oral nutrition supplements (approp for nectar thick liquid consistency) between meals @ 10am and 2pm, each to provide 290 kcal and 9 g PRO.    Implementation  Ordered calorie counts as rec above.  Medical food supplement therapy- Ordered Magic Cup as rec above.       Mariangel Salmon RD, LD (pager [phone])  RD will continue to follow

## 2017-04-20 NOTE — PROGRESS NOTES
[facility]  WO Nurse Inpatient Adult Pressure INJURY (PI) Wound Assessment     Initial assessment of PU(s) on pt's:   Coccyx    Data:   Patient History:      per MD note(s): 51 year old man presented with cardiogenic shock from inferior STEMI s/p RCA aspiration thrombectomy and POBA on 3/26 at [facility] s/p IABP and initiation of Dopamine, also during procedure, CHB s/p temp pacer, emesis/hematmesis and altered mental status s/p intubation transferred here for consideration of mechanical support on 3/27. Had PCI to RCA, LAD and LCx (on ASA and plavix) started on epinephrine in addition to dopamine, post procedure developed distributive shock picture from infection (aspiration pneumonia? Sputum cx growing staph aureus, on vanco and zosyn) vs post-MI SIRS response. s/p SC IABP placement on    Current mattress:  AtmosAir  Current pressure relieving devices:  Heel lift boots and Pillows    Moisture Management:  Incontinence Protocol, and Urinary Catheter    Catheter secured? Yes    Current Diet / Nutrition:       Active Diet Order      Dysphagia Diet Level 2 [facility] Altered Nectar Thickened Liquids (pre-thickened or use instant food thickener)      NPO Time Specified    Tube Feeding:     John Assessment and sub scores:  John Score  Av  Min: 12  Max: 18       Labs:   Recent Labs   Lab Test  17   0345   17   0425   ALBUMIN  2.1*   < >  2.4*   HGB  7.9*   < >  9.5*   INR  1.25*   < >  1.19*   WBC  9.7   < >  16.7*   A1C   --    --   7.1*   CRP   --    --   18.0*    < > = values in this interval not displayed.                                                                                                 Pressure Injury Assessment  (location #1):   Coccyx    Wound History:   Stage 2 pressure injury situated more toward right buttock  Staff have been applying Mepilex dressings, current dressing is not molding into the buttock to cover the wound     Wound Base: pink dermis no surrounding  erythema , moist    Specific Dimensions (length x width x depth, in cm) :   2 x 1 x < 0.1 cm    Palpation of the wound bed:  normal    Slough appearance:  none    Periwound Skin: intact,      Color: normal and consistent with surrounding tissue    Temperature  normal     Drainage:    Amount: scant,  Color: serous       Odor: none    Pain:  minimal , sharp         Intervention:     Patient's chart evaluated.      John Interventions:  Current John Interventions and Care Plan reviewed and updated, appropriate at this time.    Wound was assessed.    Wound Care: was done: Removal of existing dressing    Visual inspection    Cleansing with NS solution    Application of clean dressing,    Orders  revised    Supplies ordered:  comfeel dressings     Discussed plan of care withNurse           Assessment:     Pressure Injury (PI) located on Coccyx: II    Status: wound with no change    Suspect fecal incontinence and moisture are contributing factors         Plan:     Nursing to notify the Provider(s) and re-consult the WOC Nurse if wound(s) deteriorate(s).    Plan of care for wound located on Coccyx: Cleanse with microklenz spray and gauze.  Apply no-sting to periwound (Cavilon lollipop)  Cover with thin hydrocolloid dressing (Comfeel #[id number])  Carefully mold into place and hold in place for 1-2 minutes (heat from your hand will help it adhere.)  Change every 3 days and as needed.         WOC Nurse will return: Mondays and Thursday  Face to face time: 10 minutes

## 2017-04-20 NOTE — CONSULTS
General Infectious Disease Service Consultation       Patient:  Luke Henao   Date of birth 1965, Medical record number [id number]  Date of Visit:  04/20/2017  Date of Admission: 3/27/2017  Consult Requester: Poornima Hanley     Consult Question: Assistance with antibiotics in patient with fevers.         Assessment and Recommendations:   ASSESSMENT:  Luke Henao is a 51 year old [place] man with history of diabetes, now with cardiogenic shock from an inferior STEMI, s/p RCA aspiration thrombectomy and multiple stent placements. He is now inotrope and IABP-dependent, and spiking intermittent fevers. Possible etiology of shock includes cardiogenic +/- distributive with likely infection. CXR consistent with possible RUL opacity, concerning for HCAP, though UA and urine culture also suggestive of UTI. Only positive cultures to date include two urine cultures with E.coli and endotracheal samples with Staph aureus and Haemophilus influenzae. He is now afebrile and without complaints other than RUE pain around the former PICC site. We suspect the fevers may be due to HCAP and UTI, and agree with broad-spectrum antibiotics for now.    RECOMMENDATIONS:    Continue vancomycin and Zosyn for now.     Remove and replace the Chavis catheter, and repeat UA and UC.    Repeat cultures with fevers.    If febrile again, may warrant evaluation for DVT in upper and lower extremities.      Patient was seen and evaluated with attending physician Dr. Romi Li.    Thank you for the consult. We will continue to follow the patient with you. Please call with any questions.    Callie Avendaño MD/PhD  Internal Medicine Resident, PGY3           History of Present Illness:   Luke Henao is a 51 year old [place] man with history of diabetes, who presented to [facility] via EMS on 3/28/17. He complained initially of a 2 day history of flu-like symptoms, with nausea and vomiting. On EMS arrival, he was noted to by  hypotensive, and EKG was concerning for inferior STEMI. He went to the cath lab urgently, found to have severe LAD disease and LCx stenosis. He underwent aspiration thrombectomy and POBA of 2 sites, felt to need CABG, though thought to be a poor surgical candidate. While in the cath lab, was noted to be persistently hypotensive, and had an episode of hematemesis. He also developed complete heart block with bradycardia, and a temporary pacemaker and IABP were placed. He was started on dopamine and then transferred to [facility] for further management. On arrival, he underwent PCI with 7 stents placed. Since then, he has remained hypotensive and IABP-dependent, now on inotropes with replacement of a subclavian IABP.     The infectious disease team has been consulted for assistance in managing antimicrobials given intermittent fevers. Hospital course notable for fevers on 3/27 and 3/28 (with sputum culture growing Staph aureus and Haemophilus influenzae), thought secondary to possible aspiration pneumonia, and treated with vancomycin + zosyn, narrowed to Unasyn alone 3/30 through 4/3, then broadened again to vancomycin + Zosyn from 4/3 through 4/4), with Zosyn alone from 4/4 through 4/7. He was fever free and off all antibiotics from 4/7 through 4/15. Urine culture from 4/12 grew 10-50K colonies of E.coli. He spiked a low-grade fever on 4/15 and was started back on vancomycin + Zosyn, narrowed to ceftriaxone alone on 4/16 (to cover the E.coli UTI) through 4/19. Subclavian IABP was placed on 4/19, though he spiked a high fever to 102.2 and was broadened to vancomycin + zosyn again. PICC line replaced today, and [place] Benjy catheter removed.    Today, the patient reports his biggest concern is pain in the RUE surrounding the PICC site. A new PICC has been placed in his LUE, though the old line remained in place until all IV access was secured. He denies nausea, vomiting, abdominal pain, constipation, diarrhea, headaches, SOB,  chest pain or cough. No rashes or concerning lesions. He denies any dysuria, though urinary catheter remains in place.     Antibiotic History:    Vancomycin + Zosyn (3/27 - 3/30)    Unasyn (3/30 - 4/3)    Vancomycin + Zosyn (4/3 - 4/4)    Zosyn (4/4 - 4/7)    Vancomycin + Zosyn (4/15 - 4/16)    Ceftriaxone (4/16 - 4/19)    Vancomycin + Zosyn (4/19 - current)    Notable Microbiology:    Sputum (endotracheal) sample from 3/28 with Staph aureus and Haemophilus influenzae    Influenza A/B negative on 3/28    Sputum (endotracheal) sample from 3/29 with Staph aureus    C.diff PCR negative on 4/7    Urine culture from 4/12 with 10-50K colonies Escherichia coli (penicillin and fluoroquinolone-resistant)    Urine culture from 4/15 with >100K colonies E. Coli (penicillin and fluoroquinolone-resistant)    Multiple blood cultures this hospitalization remain NGTD           Review of Systems:   A full 10-point review of systems was obtained and is negative except for the mentioned in the HPI above.           Past Medical History:     Patient Active Problem List   Diagnosis     STEMI (ST elevation myocardial infarction) (H)     Acute MI (H)     Cardiogenic shock (H)            Past Surgical History:     Past Surgical History:   Procedure Laterality Date     COLONOSCOPY N/A 4/17/2017    Procedure: COLONOSCOPY;  Surgeon: Rashaad Bundy MD;  Location:  GI            Family History:   No family history on file.         Social History:     Social History   Substance Use Topics     Smoking status: Current Every Day Smoker     Packs/day: 0.50     Types: Cigarettes     Smokeless tobacco: Not on file     Alcohol use Not on file     History   Sexual Activity     Sexual activity: Not on file            Current Medications (antimicrobials listed in bold):       [START ON 4/21/2017] aspirin  324 mg Oral or Feeding Tube Daily     heparin lock flush  5-10 mL Intracatheter Q24H     QUEtiapine  12.5 mg Oral At Bedtime      piperacillin-tazobactam  4.5 g Intravenous Q6H     vancomycin (VANCOCIN) IV  1,250 mg Intravenous Q12H     pantoprazole  40 mg Per Feeding Tube BID     zinc sulfate (20 mg [place]. Zn/mL)  220 mg Per Feeding Tube Daily     vitamin A-D & C drops  7 mL Per Feeding Tube BID     atorvastatin  40 mg Oral or Feeding Tube Daily at 8 pm     pneumococcal vaccine  0.5 mL Intramuscular Once     sodium chloride (PF)  3 mL Intracatheter Q8H     multivitamins with minerals  15 mL Per Feeding Tube Daily            Allergies:   No Known Allergies         Physical Exam:   Ranges for vital signs:  Temp:  [98.4  F (36.9  C)-102.2  F (39  C)] 100.1  F (37.8  C)  Heart Rate:  [116-138] 120  Resp:  [14-34] 17  BP: (141)/(79) 141/79  MAP:  [61 mmHg-100 mmHg] 77 mmHg  Arterial Line BP: ()/(48-80) 89/61  SpO2:  [93 %-100 %] 94 %    Physical Examination:  GENERAL:  Alert, interactive, lying in bed in no acute distress.   HEENT:  Head is normocephalic, atraumatic.   EYES:  Eyes have anicteric sclerae without conjunctival injection or stigmata of endocarditis.    ENT:  Oropharynx is moist without exudates or ulcers. Tongue is midline. Dentition intact. NG in place.  LUNGS:  Coarse breath sounds bilaterally, no wheezes. Normal respiratory effort on 2 L NC.  CARDIOVASCULAR:  Tachycardic, regular rhythm. There is a holosystolic murmur at the apex.  ABDOMEN:  Normal bowel sounds, soft, non-tender. Non-distended. No appreciable hepatosplenomegaly.  : Chavis in place draining clear yellow urine.  SKIN:  No acute rashes. Site of old IABP in right groin is clear and dry, with no erythema or purulence noted. Lines are in place without any surrounding erythema or exudate. RUE PICC line is tender. No stigmata of endocarditis.  EXTREMITIES: Mild pedal edema bilaterally. Wearing SCDs.  NEUROLOGIC:  Grossly nonfocal. Alert and oriented. Moves all extremities.           Laboratory Data:     Inflammatory Markers    Recent Labs   Lab Test  04/08/17   4720    CRP  18.0*       Hematology Studies  Recent Labs   Lab Test  04/20/17   0345  04/19/17   2150  04/19/17   1159  04/19/17   0903  04/19/17   0340  04/18/17   0329 04/17/17 2002 04/17/17   0404   03/27/17   0551   03/26/17   2253  03/26/17   1800   WBC  9.7   --   8.3   --   6.4  7.1   --   9.4  8.7   < >  15.9*   < >  18.6*  14.3*   ANEU   --    --    --    --    --    --    --    --    --    --   10.7*   --   15.4*  12.1*   AEOS   --    --    --    --    --    --    --    --    --    --   0.0   --   0.0  0.0   HGB  7.9*  8.2*  8.3*  8.2*  8.7*  8.0*   < >  9.1*  8.3*   < >  13.1*   < >  13.6  15.7   MCV  100   --   99   --   98  99   --   98  97   < >  90   < >  91  90   PLT  181   --   157   --   153  136*   --   128*  130*   < >  177   < >  196  204    < > = values in this interval not displayed.       Immune Globulin Studies  Recent Labs   Lab Test  04/18/17   2349   IGG  1610   IGM  63   IGA  394*       Metabolic Studies   Recent Labs   Lab Test  04/20/17   0345 04/19/17   1603  04/19/17   1159  04/19/17   0903  04/19/17   0340   04/18/17   1434   NA  145*  142  142  142  143   --   140   POTASSIUM  3.8  3.6  3.9  3.7  3.9   < >  4.2   CHLORIDE  110*  106  108   --   107   --   106   CO2  27  28  25   --   27   --   29   BUN  22  22  23   --   24   --   24   CR  1.04  1.02  1.07   --   1.08   --   1.04   GFRESTIMATED  76  77  73   --   72   --   76    < > = values in this interval not displayed.       Hepatic Studies  Recent Labs   Lab Test  04/20/17   0345 04/19/17   0340 04/18/17   0329 04/17/17   0404  04/16/17   0350  04/15/17   0350   BILITOTAL  0.4  0.5  0.6  0.6  0.9  0.8   ALKPHOS  57  63  64  55  62  71   ALBUMIN  2.1*  2.2*  2.1*  2.2*  2.2*  2.3*   AST  27  40  20  11  21  27   ALT  43  51  38  36  40  47       Thyroid Studies    Recent Labs   Lab Test  04/14/17   1751  04/08/17   0425   TSH  2.54  1.33       Microbiology:  Culture Micro   Date Value Ref Range Status   04/20/2017 No growth  after 4 hours  Final   04/19/2017 No growth after 16 hours  Final   04/19/2017 No growth after 16 hours  Final   04/15/2017 No growth after 5 days  Final   04/15/2017 >100,000 colonies/mL Escherichia coli (A)  Final   04/15/2017 Canceled, Test credited Duplicate request  Final   04/12/2017 10,000 to 50,000 colonies/mL Escherichia coli (A)  Final   04/12/2017 No growth  Final   04/12/2017 No growth  Final   04/05/2017 No growth  Final   04/03/2017 No growth  Final   04/03/2017 No growth  Final   04/03/2017 No growth  Final   04/03/2017 No growth  Final   03/29/2017 (A)  Final    Moderate growth Staphylococcus aureus Susceptibility testing done on previous   specimen     03/29/2017 No growth  Final   03/28/2017 No growth  Final   03/28/2017 (A)  Final    Heavy growth Staphylococcus aureus  Heavy growth Haemophilus influenzae Beta lactamase negative Beta-lactamase   negative Haemophilus influenzae are usually susceptible to ampicillin,   amoxacillin/clavulanic acid, levofloxacin, and 3rd generation cephalosporins,   such as ceftriaxone.  Light growth Normal brianda     03/27/2017 No growth  Final   03/26/2017 No growth  Final   03/26/2017 No growth  Final       Urine Studies    Recent Labs   Lab Test  04/19/17   2214  04/15/17   0135  04/12/17   1852  04/08/17   0705  04/05/17   0753   LEUKEST  Large*  Large*  Moderate*  Negative  Trace*   WBCU  59*  50*  5*  1  5*       Vancomycin Levels  Recent Labs   Lab Test  04/07/17   0403  04/05/17 2001 03/31/17   0321   VANCOMYCIN  14.1  25.3*  29.1*       Hepatitis B Testing Recent Labs   Lab Test  04/15/17   0350   HBCAB  Reactive   A reactive result indicates acute, chronic or past/resolved hepatitis B   infection.  *   HEPBANG  Nonreactive     Hepatitis C Testing   Hepatitis C Antibody   Date Value Ref Range Status   04/15/2017  NR Final    Nonreactive   Assay performance characteristics have not been established for newborns,   infants, and children              Relevant  Imaging:   ECHOCARDIOGRAM, 3/27/17:  The visual ejection fraction is estimated at 30-35%.  There is extensive inferior, inferoseptal, and inferolateral wall akinesis.  Basal/mid lateral wall hypokinesis  There is moderate to severe septal hypokinesis.  Septal wall motion abnormality may reflect pacemaker activation.  There is a catheter/pacemaker lead seen in the right ventricle.  The right ventricle is mildly dilated.  Severely decreased right ventricular systolic function  There is no pericardial effusion.  The rhythm was paced.  Compared to the prior study, the LVEF appears somewhat decreased. Septal wall  motion abnormality larger- may reflect, in part, that patient in sinus tach on  prior study, and ventricular paced now. No hemodynamically significant  valvular abnormalities on 2D or color flow imaging      CT CHEST/ABDOMEN/PELVIS, 3/27/17:  IMPRESSION:   1. Small mediastinal hemorrhage, small bilateral pleural effusions  which may be hemorrhagic. Small intraperitoneal hemorrhage. Minimal  pericardial hemorrhage.  2. Minimal pneumoperitoneum in the left paracolic gutter, of unknown  significance. No pneumatosis as clinically questioned.  3. IABP slightly low in position.  4. Bilateral pulmonary dependent consolidations represent compression  atelectasis, however differentials include aspiration.      ECHOCARDIOGRAM, 3/29/17:  Limited study. Ischemic CM. Moderately (EF 30-35%) reduced left ventricular  function is present. Traced at 32%.  Posterior wall akinesis is present. Lateral wall akinesis is present. Inferior  wall akinesis is present.  Right ventricular function, chamber size, wall motion, and thickness are  normal.  Dilation of the inferior vena cava is present with abnormal respiratory  variation in diameter.  Compared to prior study, RV fxn is improved.      ECHOCARDIOGRAM, 3/31/17:  The Ejection Fraction is estimated at 50-55%. Inferior,posterior,lateral,basal  septal wall akinesis as reported  before. No change.      ECHOCARDIOGRAM, 4/3/17:  Left ventricular size is normal.  The Ejection Fraction is estimated at 35-40%.  Inferior wall akinesis is present.  Lateral wall akinesis is present.  Posterior wall akinesis is present.  Right ventricular function, chamber size, wall motion, and thickness are  normal.  Moderate mitral insufficiency is present.  Moderate tricuspid insufficiency is present.  Right ventricular systolic pressure is 47mmHg above the right atrial pressure.  The inferior vena cava is normal.      CT ABDOMEN/PELVIS W/OUT CONTRAST, 4/4/17:  IMPRESSION:   1. No evidence of ischemic bowel, obstruction, or intra-abdominal  infection.  2. Bibasilar patchy pulmonary opacities likely represent combination  of aspiration and atelectasis.  3. Small amount of simple free fluid within the lower abdomen.  4. Small left retroperitoneal hematoma along the iliac vasculature  likely postprocedural.   5. Unchanged size of bilateral pleural effusions, which now consist of  simple fluid.    ECHOCARDIOGRAM, 4/5/17:  Moderately (EF 35-40%) reduced left ventricular function is present. Traced at 40%.  Moderate mitral insufficiency is present.  The cause of the mitral insufficiency appears to be restricted posterior  leaflet from posterior MI. No mitral leaflet pathology seen.  Dilation of the inferior vena cava is present with abnormal respiratory  variation in diameter.      US ABDOMEN, 4/6/17:  COMPARISON: CT 4/4/2017  HISTORY: Assess for gallbladder infection  IMPRESSION:   1. No acute findings. No evidence of cholecystitis.  2. Mildly echogenic liver parenchyma, consistent with intrinsic  hepatic disease, such as hepatic steatosis.     CT ABDOMEN PELVIS W/O CONTRAST, 4/11/2017 6:39 PM  COMPARISON: CT on 4/4/2017.  HISTORY: abdominal pain and maroon BMs.  Impression:  1. Stable small retroperitoneal hemorrhage adjacent to left iliac  vessels in the left low pelvis.   2. Slightly improved small bilateral  pleural effusions and associated  bibasilar opacities, likely representing pulmonary edema.  3. Previously seen intraperitoneal fluid has resolved.  4. Intra-aortic balloon pump in the descending thoracic aorta,  superior tip out of view.    XR CHEST PORT 1 VW, 4/19/2017 10:19 PM  Indication: fever  Comparison: Chest x-ray on 4/19/2017.  Impression:   1. Intra-aortic balloon pump tip slightly more superior compared to  prior, chest above the level of the suzanne.  2. Slightly worsened left basilar opacities; infection or atelectasis.  3. Stable perihilar opacities and tiny bilateral pleural effusions.  Likely mild pulmonary edema.

## 2017-04-20 NOTE — PROGRESS NOTES
CVICU Progress Note  04/20/2017    Subjective and Interval Events: Febrile last night. Started on antibiotics. No bleeding from surgical sites. Hgb stable. Extubated following procedure and doing well on NC.    Objective:  Vitals:   Temp:  [98.2  F (36.8  C)-102.2  F (39  C)] 98.8  F (37.1  C)  Heart Rate:  [103-138] 134  Resp:  [13-29] 29  BP: (100-141)/(56-79) 141/79  MAP:  [61 mmHg-96 mmHg] 82 mmHg  Arterial Line BP: ()/(48-72) 105/57  FiO2 (%):  [40 %-100 %] 40 %  SpO2:  [93 %-100 %] 98 %    Intake/Output:   I/O last 3 completed shifts:  In: 2779.79 [P.O.:20; I.V.:1594.79; NG/GT:290]  Out: 2365 [Urine:2265; Blood:100]    Physical exam:  General: Interactive, friedly  Neuro: A&Ox3, NAD  Resp: Breathing non-labored on NC. R shoulder incision CDI. R subclavian IABP site CDI.  CV: Tachycardic  Abdomen: Soft, Non-distended, Non-tender  Extremities: warm and well perfused. R groin site CDI without any swelling.    Labs:    Recent Labs  Lab 04/20/17  0345 04/19/17  2150 04/19/17  1603 04/19/17  1159   WBC 9.7  --   --  8.3   HGB 7.9* 8.2*  --  8.3*     --   --  157   INR 1.25*  --   --  1.23*   *  --  142 142   POTASSIUM 3.8  --  3.6 3.9   BUN 22  --  22 23   CR 1.04  --  1.02 1.07       Assessment and Plan: 51 year old man presented with cardiogenic shock from inferior STEMI s/p RCA aspiration thrombectomy and POBA on 3/26 at [facility] s/p IABP and initiation of Dopamine, also during procedure, CHB s/p temp pacer, emesis/hematmesis and altered mental status s/p intubation transferred here for consideration of mechanical support on 3/27. Patient had R fem IABP in place. Patient deemed to not be LVAD candidate at this time s/p R subclavian IABP 4/19.    - Incisions CDI with no signs of swelling or bleeding  - Please contact CVTS if any questions or concerns    Seen and discussed with Dr. Oswald Edwards MD (PGY-3)  General Surgery  Pager #[phone]

## 2017-04-20 NOTE — PROGRESS NOTES
Colonoscopy done at bedside around 1315. Time out performed. 2mg Versed and 75mcg of Fentanyl given for sedation. BP 120s/70s. HR 80s-90s during procedure. O2 sats 96% on 2L nasal cannula. Pt awake and tolerated procedure well.

## 2017-04-20 NOTE — PLAN OF CARE
Problem: Goal Outcome Summary  Goal: Goal Outcome Summary  OT 4E: Pt now s/p R subclavian IABP. Facilitated increased independence with functional transfers and standing tolerance for ADLs. Pt completed sit <> stands x6 with max A x2 progressing to min A x2 and blocking at bilateral knees. Pt tolerated 15-30 standing bouts with min A x2 verbal/tactile cues and FWW. Pt's max  O2 sats >93%.      Rec TCU when medically stable to address cognitive impairments, balance deficits, and decreased activity tolerance.

## 2017-04-20 NOTE — PLAN OF CARE
Problem: Goal Outcome Summary  Goal: Goal Outcome Summary  SLP: Pt seen bedside for dysphagia f/u.  Pt now able to be positioned upright but is s/p intubation from procedure and exhibiting persistent positive s/s of aspiration on thin liquids.  Additionally, pt impulsive with intake further increasing risk related to aspiration. Recommend dysphagia diet level 2 and nectar thick liquids with supervision when fully alert and positioned upright. Pt will need to pace self, take small bites/sips, and alternate consistencies.  ST to follow.

## 2017-04-21 ENCOUNTER — APPOINTMENT (OUTPATIENT)
Dept: CARDIOLOGY | Facility: CLINIC | Age: 52
DRG: 228 | End: 2017-04-21
Payer: COMMERCIAL

## 2017-04-21 ENCOUNTER — APPOINTMENT (OUTPATIENT)
Dept: CARDIOLOGY | Facility: CLINIC | Age: 52
DRG: 228 | End: 2017-04-21
Attending: INTERNAL MEDICINE
Payer: COMMERCIAL

## 2017-04-21 ENCOUNTER — APPOINTMENT (OUTPATIENT)
Dept: GENERAL RADIOLOGY | Facility: CLINIC | Age: 52
DRG: 228 | End: 2017-04-21
Attending: INTERNAL MEDICINE
Payer: COMMERCIAL

## 2017-04-21 ENCOUNTER — OFFICE VISIT (OUTPATIENT)
Dept: INTERPRETER SERVICES | Facility: CLINIC | Age: 52
End: 2017-04-21

## 2017-04-21 DIAGNOSIS — I34.0 MITRAL REGURGITATION: Primary | ICD-10-CM

## 2017-04-21 LAB
ALBUMIN SERPL-MCNC: 2.3 G/DL (ref 3.4–5)
ALP SERPL-CCNC: 63 U/L (ref 40–150)
ALT SERPL W P-5'-P-CCNC: 38 U/L (ref 0–70)
ANION GAP SERPL CALCULATED.3IONS-SCNC: 6 MMOL/L (ref 3–14)
ANION GAP SERPL CALCULATED.3IONS-SCNC: 7 MMOL/L (ref 3–14)
ANION GAP SERPL CALCULATED.3IONS-SCNC: 8 MMOL/L (ref 3–14)
AST SERPL W P-5'-P-CCNC: 21 U/L (ref 0–45)
BACTERIA SPEC CULT: NO GROWTH
BACTERIA SPEC CULT: NO GROWTH
BASE EXCESS BLDA CALC-SCNC: 7.2 MMOL/L
BASE EXCESS BLDV CALC-SCNC: 10.3 MMOL/L
BASE EXCESS BLDV CALC-SCNC: 10.8 MMOL/L
BASE EXCESS BLDV CALC-SCNC: 4.8 MMOL/L
BASE EXCESS BLDV CALC-SCNC: 8 MMOL/L
BASE EXCESS BLDV CALC-SCNC: 8.8 MMOL/L
BILIRUB DIRECT SERPL-MCNC: 0.1 MG/DL (ref 0–0.2)
BILIRUB SERPL-MCNC: 0.5 MG/DL (ref 0.2–1.3)
BUN SERPL-MCNC: 16 MG/DL (ref 7–30)
BUN SERPL-MCNC: 20 MG/DL (ref 7–30)
BUN SERPL-MCNC: 21 MG/DL (ref 7–30)
CA-I BLD-MCNC: 4.3 MG/DL (ref 4.4–5.2)
CALCIUM SERPL-MCNC: 7.3 MG/DL (ref 8.5–10.1)
CALCIUM SERPL-MCNC: 7.6 MG/DL (ref 8.5–10.1)
CALCIUM SERPL-MCNC: 7.6 MG/DL (ref 8.5–10.1)
CHLORIDE SERPL-SCNC: 103 MMOL/L (ref 94–109)
CHLORIDE SERPL-SCNC: 104 MMOL/L (ref 94–109)
CHLORIDE SERPL-SCNC: 105 MMOL/L (ref 94–109)
CO2 SERPL-SCNC: 32 MMOL/L (ref 20–32)
CO2 SERPL-SCNC: 32 MMOL/L (ref 20–32)
CO2 SERPL-SCNC: 33 MMOL/L (ref 20–32)
CREAT SERPL-MCNC: 1.17 MG/DL (ref 0.66–1.25)
CREAT SERPL-MCNC: 1.18 MG/DL (ref 0.66–1.25)
CREAT SERPL-MCNC: 1.23 MG/DL (ref 0.66–1.25)
ERYTHROCYTE [DISTWIDTH] IN BLOOD BY AUTOMATED COUNT: 17.3 % (ref 10–15)
ERYTHROCYTE [DISTWIDTH] IN BLOOD BY AUTOMATED COUNT: 17.6 % (ref 10–15)
GFR SERPL CREATININE-BSD FRML MDRD: 62 ML/MIN/1.7M2
GFR SERPL CREATININE-BSD FRML MDRD: 65 ML/MIN/1.7M2
GFR SERPL CREATININE-BSD FRML MDRD: 66 ML/MIN/1.7M2
GLUCOSE BLDC GLUCOMTR-MCNC: 109 MG/DL (ref 70–99)
GLUCOSE BLDC GLUCOMTR-MCNC: 126 MG/DL (ref 70–99)
GLUCOSE BLDC GLUCOMTR-MCNC: 128 MG/DL (ref 70–99)
GLUCOSE BLDC GLUCOMTR-MCNC: 130 MG/DL (ref 70–99)
GLUCOSE BLDC GLUCOMTR-MCNC: 133 MG/DL (ref 70–99)
GLUCOSE BLDC GLUCOMTR-MCNC: 136 MG/DL (ref 70–99)
GLUCOSE BLDC GLUCOMTR-MCNC: 136 MG/DL (ref 70–99)
GLUCOSE BLDC GLUCOMTR-MCNC: 137 MG/DL (ref 70–99)
GLUCOSE BLDC GLUCOMTR-MCNC: 138 MG/DL (ref 70–99)
GLUCOSE BLDC GLUCOMTR-MCNC: 145 MG/DL (ref 70–99)
GLUCOSE BLDC GLUCOMTR-MCNC: 148 MG/DL (ref 70–99)
GLUCOSE BLDC GLUCOMTR-MCNC: 149 MG/DL (ref 70–99)
GLUCOSE BLDC GLUCOMTR-MCNC: 152 MG/DL (ref 70–99)
GLUCOSE BLDC GLUCOMTR-MCNC: 167 MG/DL (ref 70–99)
GLUCOSE BLDC GLUCOMTR-MCNC: 168 MG/DL (ref 70–99)
GLUCOSE BLDC GLUCOMTR-MCNC: 168 MG/DL (ref 70–99)
GLUCOSE BLDC GLUCOMTR-MCNC: 193 MG/DL (ref 70–99)
GLUCOSE BLDC GLUCOMTR-MCNC: 204 MG/DL (ref 70–99)
GLUCOSE BLDC GLUCOMTR-MCNC: 81 MG/DL (ref 70–99)
GLUCOSE BLDC GLUCOMTR-MCNC: 83 MG/DL (ref 70–99)
GLUCOSE BLDC GLUCOMTR-MCNC: 92 MG/DL (ref 70–99)
GLUCOSE BLDC GLUCOMTR-MCNC: 96 MG/DL (ref 70–99)
GLUCOSE BLDC GLUCOMTR-MCNC: 97 MG/DL (ref 70–99)
GLUCOSE SERPL-MCNC: 122 MG/DL (ref 70–99)
GLUCOSE SERPL-MCNC: 204 MG/DL (ref 70–99)
GLUCOSE SERPL-MCNC: 66 MG/DL (ref 70–99)
HCO3 BLD-SCNC: 31 MMOL/L (ref 21–28)
HCO3 BLDV-SCNC: 30 MMOL/L (ref 21–28)
HCO3 BLDV-SCNC: 32 MMOL/L (ref 21–28)
HCO3 BLDV-SCNC: 33 MMOL/L (ref 21–28)
HCO3 BLDV-SCNC: 33 MMOL/L (ref 21–28)
HCO3 BLDV-SCNC: 35 MMOL/L (ref 21–28)
HCO3 BLDV-SCNC: 35 MMOL/L (ref 21–28)
HCT VFR BLD AUTO: 26 % (ref 40–53)
HCT VFR BLD AUTO: 26.6 % (ref 40–53)
HGB BLD-MCNC: 8.1 G/DL (ref 13.3–17.7)
HGB BLD-MCNC: 8.1 G/DL (ref 13.3–17.7)
INTERPRETATION ECG - MUSE: NORMAL
LMWH PPP CHRO-ACNC: NORMAL IU/ML
MAGNESIUM SERPL-MCNC: 2.1 MG/DL (ref 1.6–2.3)
MAGNESIUM SERPL-MCNC: 2.2 MG/DL (ref 1.6–2.3)
MAGNESIUM SERPL-MCNC: 2.4 MG/DL (ref 1.6–2.3)
MAGNESIUM SERPL-MCNC: 3.7 MG/DL (ref 1.6–2.3)
MCH RBC QN AUTO: 30.5 PG (ref 26.5–33)
MCH RBC QN AUTO: 30.5 PG (ref 26.5–33)
MCHC RBC AUTO-ENTMCNC: 30.5 G/DL (ref 31.5–36.5)
MCHC RBC AUTO-ENTMCNC: 31.2 G/DL (ref 31.5–36.5)
MCV RBC AUTO: 100 FL (ref 78–100)
MCV RBC AUTO: 98 FL (ref 78–100)
MICRO REPORT STATUS: NORMAL
MICRO REPORT STATUS: NORMAL
O2/TOTAL GAS SETTING VFR VENT: ABNORMAL %
OXYHGB MFR BLD: 97 % (ref 92–100)
OXYHGB MFR BLDV: 48 %
OXYHGB MFR BLDV: 51 %
OXYHGB MFR BLDV: 53 %
OXYHGB MFR BLDV: 54 %
OXYHGB MFR BLDV: 57 %
OXYHGB MFR BLDV: 57 %
PCO2 BLD: 39 MM HG (ref 35–45)
PCO2 BLDV: 44 MM HG (ref 40–50)
PCO2 BLDV: 45 MM HG (ref 40–50)
PCO2 BLDV: 46 MM HG (ref 40–50)
PCO2 BLDV: 48 MM HG (ref 40–50)
PCO2 BLDV: 48 MM HG (ref 40–50)
PCO2 BLDV: 49 MM HG (ref 40–50)
PH BLD: 7.51 PH (ref 7.35–7.45)
PH BLDV: 7.42 PH (ref 7.32–7.43)
PH BLDV: 7.44 PH (ref 7.32–7.43)
PH BLDV: 7.47 PH (ref 7.32–7.43)
PH BLDV: 7.48 PH (ref 7.32–7.43)
PHOSPHATE SERPL-MCNC: 3.4 MG/DL (ref 2.5–4.5)
PLATELET # BLD AUTO: 189 10E9/L (ref 150–450)
PLATELET # BLD AUTO: 201 10E9/L (ref 150–450)
PO2 BLD: 111 MM HG (ref 80–105)
PO2 BLDV: 28 MM HG (ref 25–47)
PO2 BLDV: 30 MM HG (ref 25–47)
PO2 BLDV: 31 MM HG (ref 25–47)
PO2 BLDV: 31 MM HG (ref 25–47)
PO2 BLDV: 32 MM HG (ref 25–47)
PO2 BLDV: 33 MM HG (ref 25–47)
POTASSIUM BLD-SCNC: 3.5 MMOL/L (ref 3.4–5.3)
POTASSIUM BLD-SCNC: 3.5 MMOL/L (ref 3.4–5.3)
POTASSIUM BLD-SCNC: 4 MMOL/L (ref 3.4–5.3)
POTASSIUM SERPL-SCNC: 3.6 MMOL/L (ref 3.4–5.3)
POTASSIUM SERPL-SCNC: 3.6 MMOL/L (ref 3.4–5.3)
POTASSIUM SERPL-SCNC: 4 MMOL/L (ref 3.4–5.3)
PROT SERPL-MCNC: 7 G/DL (ref 6.8–8.8)
RBC # BLD AUTO: 2.66 10E12/L (ref 4.4–5.9)
RBC # BLD AUTO: 2.66 10E12/L (ref 4.4–5.9)
SODIUM SERPL-SCNC: 142 MMOL/L (ref 133–144)
SODIUM SERPL-SCNC: 143 MMOL/L (ref 133–144)
SODIUM SERPL-SCNC: 143 MMOL/L (ref 133–144)
SPECIMEN SOURCE: NORMAL
SPECIMEN SOURCE: NORMAL
VANCOMYCIN SERPL-MCNC: 20.6 MG/L
WBC # BLD AUTO: 10.7 10E9/L (ref 4–11)
WBC # BLD AUTO: 13.1 10E9/L (ref 4–11)

## 2017-04-21 PROCEDURE — 25000128 H RX IP 250 OP 636

## 2017-04-21 PROCEDURE — 25000125 ZZHC RX 250: Performed by: INTERNAL MEDICINE

## 2017-04-21 PROCEDURE — 25000132 ZZH RX MED GY IP 250 OP 250 PS 637: Performed by: INTERNAL MEDICINE

## 2017-04-21 PROCEDURE — 93505 ENDOMYOCARDIAL BIOPSY: CPT

## 2017-04-21 PROCEDURE — S0171 BUMETANIDE 0.5 MG: HCPCS | Performed by: INTERNAL MEDICINE

## 2017-04-21 PROCEDURE — 85027 COMPLETE CBC AUTOMATED: CPT

## 2017-04-21 PROCEDURE — 76932 ECHO GUIDE FOR HEART BIOPSY: CPT

## 2017-04-21 PROCEDURE — 85520 HEPARIN ASSAY: CPT | Performed by: INTERNAL MEDICINE

## 2017-04-21 PROCEDURE — 88307 TISSUE EXAM BY PATHOLOGIST: CPT | Performed by: INTERNAL MEDICINE

## 2017-04-21 PROCEDURE — 25000128 H RX IP 250 OP 636: Performed by: STUDENT IN AN ORGANIZED HEALTH CARE EDUCATION/TRAINING PROGRAM

## 2017-04-21 PROCEDURE — 40000048 ZZH STATISTIC DAILY SWAN MONITORING

## 2017-04-21 PROCEDURE — 80076 HEPATIC FUNCTION PANEL: CPT | Performed by: INTERNAL MEDICINE

## 2017-04-21 PROCEDURE — 40000196 ZZH STATISTIC RAPCV CVP MONITORING

## 2017-04-21 PROCEDURE — 80048 BASIC METABOLIC PNL TOTAL CA: CPT | Performed by: INTERNAL MEDICINE

## 2017-04-21 PROCEDURE — T1013 SIGN LANG/ORAL INTERPRETER: HCPCS | Mod: U3

## 2017-04-21 PROCEDURE — 25000128 H RX IP 250 OP 636: Performed by: PEDIATRICS

## 2017-04-21 PROCEDURE — 85027 COMPLETE CBC AUTOMATED: CPT | Performed by: INTERNAL MEDICINE

## 2017-04-21 PROCEDURE — 83735 ASSAY OF MAGNESIUM: CPT | Performed by: INTERNAL MEDICINE

## 2017-04-21 PROCEDURE — 25000132 ZZH RX MED GY IP 250 OP 250 PS 637: Performed by: STUDENT IN AN ORGANIZED HEALTH CARE EDUCATION/TRAINING PROGRAM

## 2017-04-21 PROCEDURE — 25000125 ZZHC RX 250: Performed by: STUDENT IN AN ORGANIZED HEALTH CARE EDUCATION/TRAINING PROGRAM

## 2017-04-21 PROCEDURE — 71010 XR CHEST PORT 1 VW: CPT

## 2017-04-21 PROCEDURE — 40000281 ZZH STATISTIC TRANSPORT TIME EA 15 MIN

## 2017-04-21 PROCEDURE — 84100 ASSAY OF PHOSPHORUS: CPT | Performed by: INTERNAL MEDICINE

## 2017-04-21 PROCEDURE — 82805 BLOOD GASES W/O2 SATURATION: CPT | Performed by: INTERNAL MEDICINE

## 2017-04-21 PROCEDURE — 27210787 ZZH MANIFOLD CR2

## 2017-04-21 PROCEDURE — 40000014 ZZH STATISTIC ARTERIAL MONITORING DAILY

## 2017-04-21 PROCEDURE — 82330 ASSAY OF CALCIUM: CPT | Performed by: INTERNAL MEDICINE

## 2017-04-21 PROCEDURE — 84132 ASSAY OF SERUM POTASSIUM: CPT | Performed by: INTERNAL MEDICINE

## 2017-04-21 PROCEDURE — 27211181 ZZH BALLOON TIP PRESSURE CR5

## 2017-04-21 PROCEDURE — 27211047 ZZH FORCEP BIOPSY CR10

## 2017-04-21 PROCEDURE — 25000128 H RX IP 250 OP 636: Performed by: INTERNAL MEDICINE

## 2017-04-21 PROCEDURE — 40000076 ZZH STATISTIC IABP MONITORING

## 2017-04-21 PROCEDURE — 99291 CRITICAL CARE FIRST HOUR: CPT | Mod: GC | Performed by: INTERNAL MEDICINE

## 2017-04-21 PROCEDURE — 02BK3ZX EXCISION OF RIGHT VENTRICLE, PERCUTANEOUS APPROACH, DIAGNOSTIC: ICD-10-PCS | Performed by: INTERNAL MEDICINE

## 2017-04-21 PROCEDURE — 00000146 ZZHCL STATISTIC GLUCOSE BY METER IP

## 2017-04-21 PROCEDURE — 80202 ASSAY OF VANCOMYCIN: CPT | Performed by: INTERNAL MEDICINE

## 2017-04-21 PROCEDURE — 76932 ECHO GUIDE FOR HEART BIOPSY: CPT | Mod: 26 | Performed by: INTERNAL MEDICINE

## 2017-04-21 PROCEDURE — 40000275 ZZH STATISTIC RCP TIME EA 10 MIN

## 2017-04-21 PROCEDURE — 25000132 ZZH RX MED GY IP 250 OP 250 PS 637

## 2017-04-21 PROCEDURE — 20000004 ZZH R&B ICU UMMC

## 2017-04-21 PROCEDURE — 27210982 ZZH KIT RT HC TOTES DISP CR7

## 2017-04-21 RX ORDER — HEPARIN SODIUM 10000 [USP'U]/100ML
0-3500 INJECTION, SOLUTION INTRAVENOUS CONTINUOUS
Status: DISCONTINUED | OUTPATIENT
Start: 2017-04-21 | End: 2017-05-01

## 2017-04-21 RX ORDER — BUMETANIDE 0.25 MG/ML
3 INJECTION INTRAMUSCULAR; INTRAVENOUS ONCE
Status: COMPLETED | OUTPATIENT
Start: 2017-04-21 | End: 2017-04-21

## 2017-04-21 RX ORDER — BUMETANIDE 0.25 MG/ML
3 INJECTION INTRAMUSCULAR; INTRAVENOUS ONCE
Status: DISCONTINUED | OUTPATIENT
Start: 2017-04-21 | End: 2017-04-21

## 2017-04-21 RX ORDER — BUMETANIDE 0.25 MG/ML
2 INJECTION INTRAMUSCULAR; INTRAVENOUS ONCE
Status: COMPLETED | OUTPATIENT
Start: 2017-04-21 | End: 2017-04-21

## 2017-04-21 RX ADMIN — POTASSIUM CHLORIDE 20 MEQ: 29.8 INJECTION, SOLUTION INTRAVENOUS at 09:35

## 2017-04-21 RX ADMIN — HUMAN INSULIN 6 UNITS/HR: 100 INJECTION, SOLUTION SUBCUTANEOUS at 00:40

## 2017-04-21 RX ADMIN — POTASSIUM CHLORIDE 20 MEQ: 1.5 POWDER, FOR SOLUTION ORAL at 01:06

## 2017-04-21 RX ADMIN — Medication 7 ML: at 20:05

## 2017-04-21 RX ADMIN — Medication 12.5 MG: at 21:40

## 2017-04-21 RX ADMIN — POTASSIUM CHLORIDE 20 MEQ: 1.5 POWDER, FOR SOLUTION ORAL at 18:22

## 2017-04-21 RX ADMIN — POTASSIUM PHOSPHATE, MONOBASIC AND POTASSIUM PHOSPHATE, DIBASIC 15 MMOL: 224; 236 INJECTION, SOLUTION INTRAVENOUS at 01:03

## 2017-04-21 RX ADMIN — PANTOPRAZOLE SODIUM 40 MG: 40 TABLET, DELAYED RELEASE ORAL at 08:52

## 2017-04-21 RX ADMIN — HUMAN INSULIN 6 UNITS/HR: 100 INJECTION, SOLUTION SUBCUTANEOUS at 18:27

## 2017-04-21 RX ADMIN — PIPERACILLIN AND TAZOBACTAM 4.5 G: 4; .5 INJECTION, POWDER, FOR SOLUTION INTRAVENOUS at 10:56

## 2017-04-21 RX ADMIN — PIPERACILLIN AND TAZOBACTAM 4.5 G: 4; .5 INJECTION, POWDER, FOR SOLUTION INTRAVENOUS at 16:20

## 2017-04-21 RX ADMIN — VANCOMYCIN HYDROCHLORIDE 1250 MG: 10 INJECTION, POWDER, LYOPHILIZED, FOR SOLUTION INTRAVENOUS at 18:22

## 2017-04-21 RX ADMIN — POTASSIUM CHLORIDE 20 MEQ: 1.5 POWDER, FOR SOLUTION ORAL at 05:09

## 2017-04-21 RX ADMIN — OXYCODONE HYDROCHLORIDE 5 MG: 5 TABLET ORAL at 05:08

## 2017-04-21 RX ADMIN — POTASSIUM CHLORIDE 20 MEQ: 1.5 POWDER, FOR SOLUTION ORAL at 14:21

## 2017-04-21 RX ADMIN — BUMETANIDE 2 MG: 0.25 INJECTION, SOLUTION INTRAMUSCULAR; INTRAVENOUS at 06:07

## 2017-04-21 RX ADMIN — ACETAMINOPHEN 650 MG: 325 TABLET, FILM COATED ORAL at 20:31

## 2017-04-21 RX ADMIN — BUMETANIDE 3 MG: 0.25 INJECTION INTRAMUSCULAR; INTRAVENOUS at 03:19

## 2017-04-21 RX ADMIN — PANTOPRAZOLE SODIUM 40 MG: 40 TABLET, DELAYED RELEASE ORAL at 20:05

## 2017-04-21 RX ADMIN — BUMETANIDE 1 MG/HR: 0.25 INJECTION INTRAMUSCULAR; INTRAVENOUS at 06:08

## 2017-04-21 RX ADMIN — Medication 7 ML: at 08:52

## 2017-04-21 RX ADMIN — HEPARIN SODIUM 750 UNITS/HR: 10000 INJECTION, SOLUTION INTRAVENOUS at 21:45

## 2017-04-21 RX ADMIN — ASPIRIN 81 MG CHEWABLE TABLET 324 MG: 81 TABLET CHEWABLE at 08:51

## 2017-04-21 RX ADMIN — POTASSIUM CHLORIDE 20 MEQ: 29.8 INJECTION, SOLUTION INTRAVENOUS at 22:56

## 2017-04-21 RX ADMIN — Medication 220 MG: at 08:52

## 2017-04-21 RX ADMIN — PIPERACILLIN AND TAZOBACTAM 4.5 G: 4; .5 INJECTION, POWDER, FOR SOLUTION INTRAVENOUS at 21:43

## 2017-04-21 RX ADMIN — ATORVASTATIN CALCIUM 40 MG: 40 TABLET, FILM COATED ORAL at 20:05

## 2017-04-21 RX ADMIN — PIPERACILLIN AND TAZOBACTAM 4.5 G: 4; .5 INJECTION, POWDER, FOR SOLUTION INTRAVENOUS at 05:00

## 2017-04-21 RX ADMIN — MILRINONE LACTATE 0.38 MCG/KG/MIN: 200 INJECTION, SOLUTION INTRAVENOUS at 04:22

## 2017-04-21 RX ADMIN — HUMAN INSULIN 6 UNITS/HR: 100 INJECTION, SOLUTION SUBCUTANEOUS at 20:04

## 2017-04-21 RX ADMIN — MILRINONE LACTATE 0.38 MCG/KG/MIN: 200 INJECTION, SOLUTION INTRAVENOUS at 18:26

## 2017-04-21 RX ADMIN — MULTIVIT AND MINERALS-FERROUS GLUCONATE 9 MG IRON/15 ML ORAL LIQUID 15 ML: at 08:52

## 2017-04-21 ASSESSMENT — PAIN DESCRIPTION - DESCRIPTORS
DESCRIPTORS: SORE

## 2017-04-21 NOTE — PLAN OF CARE
Problem: Goal Outcome Summary  Goal: Goal Outcome Summary  Outcome: No Change  D/I/A: Alert most of shift, able to communicate with /family at bedside. Up to chair x1. Complained of R arm pain most of shift, MDs notified. Sinus tach 120s-130s throughout shift. BPs labile hypotensive this morning, MAPs high this afternoon in the 90s-100s, MDs notified. PA pressures at 0800 48/34 CVP 15. T-max 100.1. Blood culture sent from old R PICC site.  R PICC pulled due to pain and possible infection. L IJ [place] pulled this afternoon. New PICC placed on L arm. On 3L nasal cannula O2 sats 99%. Pt complained of SOB with wheezing this afternoon, MDs notified, Bumex given. New d[place] placed due to possible infection and UA culture sent from new dawkins. Urine output adequate atleast 45mL/hr. 4x loose smear/small stools. Swallow study done this AM, dysphagia 2 diet w/ nectar thick liquids, pt on calorie counts. Milrinone and Insulin gtts infusing, q1hr blood glucs done.   P: Continue to monitor respiratory and CV status, notify MDs of any issues.

## 2017-04-21 NOTE — PLAN OF CARE
Problem: Goal Outcome Summary  Goal: Goal Outcome Summary  Outcome: No Change     D/I: Pt remains ST 130s. tMax 99.8. CVP initially 20, then up to 25, which was double checked and verified with second RN. MDs ordered additional dose of bumex and by 0400 CVP down to 17. ScvO2 (from PICC) running 51-57 with donna CI 2.5-3.2. SVR ~1000-~1300. IABP remains 1:1 with 100% augmentation. Assisted pressures running 80s-100s/50s-60s with MAP 70s-90s and augmenting 90s-120s. Remains on fixed-rate milrinone.      Remains on 3LNC. At start of shift, pt had labored breathing with coarse/wheezy lung sounds: this has improved significantly with diuresis.      Appears to sleep between cares. Oxycodone x 2 for relief of IABP insertion site pain. Interacts appropriately and DEL RIO equally.      NPO at 0300 in anticipation of RHC and biopsy today. Total UO since midnight = 2775cc. Weight is down 0.1 kgs, which is not consistent with I/O. Starting bumex gtt this AM. Needs reminders re: diet/fluid modifications.      AM labs noted: potassium, magnesium, and phosphorous replaced overnight per protocols. Heparin gtt off at 0200 for procedure today.      A/P: Hemodynamically stable tolerating current plan of care. Will report off to oncoming nurse.      -Airam Javed RN 4/21/2017 5:44 AM

## 2017-04-21 NOTE — PLAN OF CARE
Problem: Goal Outcome Summary  Goal: Goal Outcome Summary  PT 4E: Cancel - Pt sedated follow R heart cath. Accurate functional eval not attainable. Will cancel and reschedule for re-eval and treatment on 4/22.

## 2017-04-21 NOTE — PROGRESS NOTES
"[facility] [place]  Cardiology II Service / Advanced Heart Failure        Interval History:   - Overnight remained afebrile  - Complains of mild pain at incision site for his SC IABP  - WBC rising this AM, BCx remain negative  - Very responsive to bumex inf      Intake/Output Summary (Last 24 hours) at 04/21/17 1305  Last data filed at 04/21/17 1200   Gross per 24 hour   Intake          3210.94 ml   Output             6815 ml   Net         -3604.06 ml               Pertinent Medications:  I have reviewed this patient's current medications      aspirin  324 mg Oral or Feeding Tube Daily     heparin lock flush  5-10 mL Intracatheter Q24H     QUEtiapine  12.5 mg Oral At Bedtime     piperacillin-tazobactam  4.5 g Intravenous Q6H     vancomycin (VANCOCIN) IV  1,250 mg Intravenous Q12H     pantoprazole  40 mg Per Feeding Tube BID     zinc sulfate (20 mg [employer]. Zn/mL)  220 mg Per Feeding Tube Daily     vitamin A-D & C drops  7 mL Per Feeding Tube BID     atorvastatin  40 mg Oral or Feeding Tube Daily at 8 pm     pneumococcal vaccine  0.5 mL Intramuscular Once     sodium chloride (PF)  3 mL Intracatheter Q8H     multivitamins with minerals  15 mL Per Feeding Tube Daily         Examination:  /74 (BP Location: Left arm)  Temp 97.4  F (36.3  C) (Oral)  Resp 21  Ht 1.575 m (5' 2.01\")  Wt 58.9 kg (129 lb 13.6 oz)  SpO2 100%  BMI 23.74 kg/m2  GEN: A, cooperative and conversational   NECK: can not assess JVP   RESP: crackles   CV: S1S2S3, holossystolic murmur at the apex  ABD: Soft, nontender to palpation, no HSM, +BS, no guarding  EXT: No peripheral edema, warm/well perfused   NEURO: CN II-XII intact, normal 5/5 strength in all extremitites  SKIN: Normal skin turgor, no rash on limited exam    Data:  CMP  Recent Labs  Lab 04/21/17  1135 04/21/17  0838 04/21/17  0419 04/20/17  2359 04/20/17  2350 04/20/17  2048 04/20/17  1949 04/20/17  1639 04/20/17  0345 04/19/17  1603  04/19/17  0340  " 04/18/17  0329   NA  --   --  143  --   --   --   --  141 145* 142  < > 143  < > 142   POTASSIUM 3.5 3.5 3.6 4.0  --  3.5 Canceled, Test credited Results questioned - new specimen has been requested Unsatisfactory specimen - contaminatedNOTIFIIED ERIC RAM AT ,4/10/17 @ 2054 BY SY 3.4 3.8 3.6  < > 3.9  < > 3.8   CHLORIDE  --   --  105  --   --   --   --  108 110* 106  < > 107  < > 107   CO2  --   --  33*  --   --   --   --  26 27 28  < > 27  < > 26   ANIONGAP  --   --  6  --   --   --   --  8 8 8  < > 9  < > 10   GLC  --   --  66*  --   --   --   --  219* 128* 173*  < > 167*  < > 150*   BUN  --   --  16  --   --   --   --  19 22 22  < > 24  < > 23   CR  --   --  1.17  --   --   --   --  0.99 1.04 1.02  < > 1.08  < > 1.04   GFRESTIMATED  --   --  66  --   --   --   --  80 76 77  < > 72  < > 76   GFRESTBLACK  --   --  80  --   --   --   --  >90African American GFR Calc >90African American GFR Calc >90African American GFR Calc  < > 88  < > >90African American GFR Calc   ROB  --   --  7.6*  --   --   --   --  7.6* 7.7* 7.5*  < > 8.2*  < > 6.9*   MAG  --   --  2.4*  --  3.7* 2.0 Canceled, Test credited Results questioned - new specimen has been requested Unsatisfactory specimen - contaminatedNOTIFIIED ERIC RAM AT ,4/10/17 @ 2054 BY SY 2.3 2.4* 2.3  < > 2.0  < > 2.1   PHOS  --   --  3.4  --   --  2.0* Canceled, Test credited Results questioned - new specimen has been requested Unsatisfactory specimen - contaminatedNOTIFIIED ERIC SOL LEANNA AT 4E,4/10/17 @ 2054 BY SY  --  2.8  --   < > 2.8  < > 2.4*   PROTTOTAL  --   --  7.0  --   --   --   --   --  6.4*  --   --  6.4*  --  6.4*   ALBUMIN  --   --  2.3*  --   --   --   --   --  2.1*  --   --  2.2*  --  2.1*   BILITOTAL  --   --  0.5  --   --   --   --   --  0.4  --   --  0.5  --  0.6   ALKPHOS  --   --  63  --   --   --   --   --  57  --   --  63  --  64   AST  --   --  21  --   --   --   --   --  27  --   --  40  --  20   ALT  --   --  38  --   --   --    --   --  43  --   --  51  --  38   < > = values in this interval not displayed.  CBC    Recent Labs  Lab 04/21/17  0419 04/20/17  2048 04/20/17  0345 04/19/17  2150 04/19/17  1159   WBC 13.1* 10.7 9.7  --  8.3   RBC 2.66* 2.55* 2.67*  --  2.75*   HGB 8.1* 7.9* 7.9* 8.2* 8.3*   HCT 26.6* 25.5* 26.8*  --  27.2*    100 100  --  99   MCH 30.5 31.0 29.6  --  30.2   MCHC 30.5* 31.0* 29.5*  --  30.5*   RDW 17.6* 17.9* 18.0*  --  17.6*    182 181  --  157     INR    Recent Labs  Lab 04/20/17  0345 04/19/17  1159   INR 1.25* 1.23*     Arterial Blood Gas    Recent Labs  Lab 04/21/17  1135 04/21/17  0838 04/21/17  0419 04/20/17  2359  04/19/17  1603 04/19/17  1421 04/19/17  1157   PH  --  7.51*  --   --   --  7.49* 7.52* 7.45   PCO2  --  39  --   --   --  38 34* 38   PO2  --  111*  --   --   --  125* 135* 310*   HCO3  --  31*  --   --   --  29* 28 26   O2PER 3L 3L  3L 3L 3L  < > 3l 40 100  100   < > = values in this interval not displayed.    Imaging Studies:  Echo 3/27  The visual ejection fraction is estimated at 30-35%.  There is extensive inferior, inferoseptal, and inferolateral wall akinesis.  Basal/mid lateral wall hypokinesis  There is moderate to severe septal hypokinesis.  Septal wall motion abnormality may reflect pacemaker activation.  There is a catheter/pacemaker lead seen in the right ventricle.  The right ventricle is mildly dilated.  Severely decreased right ventricular systolic function  There is no pericardial effusion.  The rhythm was paced.  Compared to the prior study, the LVEF appears somewhat decreased. Septal wall  motion abnormality larger- may reflect, in part, that patient in sinus tach on  prior study, and ventricular paced now. No hemodynamically significant  valvular abnormalities on 2D or color flow imaging     CT chest/abdomen 3/27   IMPRESSION:   1. Small mediastinal hemorrhage, small bilateral pleural effusions  which may be hemorrhagic. Small intraperitoneal hemorrhage.  Minimal  pericardial hemorrhage.  2. Minimal pneumoperitoneum in the left paracolic gutter, of unknown  significance. No pneumatosis as clinically questioned.  3. IABP slightly low in position.  4. Bilateral pulmonary dependent consolidations represent compression  atelectasis, however differentials include aspiration.     Cath 3/26 [facility]  SUMMARY:   --Inferior STEMI w/ late presentation. Culprit dictated by complete  heart block, and cardiogenic shock. Emergent echo in the Cath Lab also  shows significant RV dysfunction.  --Three vessel coronary artery disease with diffuse coronary disease  out to the distal vessels  --Successful POBA of the prox and mid-RCA. Stent was not placed, in  anticipation he may need to be considered for bypass surgery in the  near future  --Insertion of a temporary pacemaker for bradycardia (AVB) and  hypotension  --Insertion of a IABP  --Upper GI bleed consistent with [place]     [facility] 3/27     CT head 3/28: normal      Echo 3/29  Interpretation Summary  Limited study. Ischemic CM. Moderately (EF 30-35%) reduced left ventricular  function is present. Traced at 32%.  Posterior wall akinesis is present. Lateral wall akinesis is present. Inferior  wall akinesis is present.  Right ventricular function, chamber size, wall motion, and thickness are  normal.  Dilation of the inferior vena cava is present with abnormal respiratory  variation in diameter.     Compared to prior study, RV fxn is improved.     Echo 3/31  Left Ventricle  The Ejection Fraction is estimated at 50-55%. Inferior,posterior,lateral,basal  septal wall akinesis as reported before. No change.     CORONARY ANGIOGRAM 4/1:   1. Both coronary arteries arise from their respective cusps.  2. Right dominant.  3. LM has mild luminal irregularities.   4. LAD supplies the apex along with the RCA (type 2 LAD) and gives rise to septal perforators and a large and branching D1. There are widely patent stents  extending from the proximal to mid LAD. The dLAD has mild to moderate disease to 30% stenosis. The D1 is jailed by the LAD stents, but has BEER 3 flow with disease to 30% stenosis.   5. The small ramus is no longer present.  6. LCX is occluded at the ostium.   7. RCA gives rise to PL branches and supplies PDA. There are widely patent stents extending from the proximal to mid RCA. The remainder of the mRCA has disease to 50% stenosis and the dRCA has disease to 10% stenosis. The RPDA has a widely patent stent and the remainder of the artery has mild disease to 10% stenosis. The RPLA has small vessels with diffuse disease including a distal branch occlusion. The RPLA supplies some small collateral branches to the dLCx.      COMPLICATIONS:  1. None      SUMMARY:   1. Cardiogenic shock following an inferior STEMI.  2. The LCx re-occlusion is not surprising as the recently revascularized LCx  had poor outflow and the revascularized portion did not supply a large territory (limited to no flow was present distal to the stented segment immediately following stenting). Repeat revascularization of the LCx would result in the same outcome of repeat closure and also risk embolizing thrombus from the LCx into the LAD so no treatment of the occluded LCx was performed.   3. Three vessel coronary artery disease with patent LAD and RCA stents and an occluded LCx.     Echo 4/3  Left ventricular size is normal.  The Ejection Fraction is estimated at 35-40%.  Inferior wall akinesis is present.  Lateral wall akinesis is present.  Posterior wall akinesis is present.  Right ventricular function, chamber size, wall motion, and thickness are  normal.  Moderate mitral insufficiency is present.  Moderate tricuspid insufficiency is present.  Right ventricular systolic pressure is 47mmHg above the right atrial pressure.  The inferior vena cava is normal.     Echo 4/5  Moderately (EF 35-40%) reduced left ventricular function is present.  Traced at  40%.  Moderate mitral insufficiency is present.  The cause of the mitral insufficiency appears to be restricted posterior  leaflet from posterior MI. No mitral leaflet pathology seen.  Dilation of the inferior vena cava is present with abnormal respiratory  variation in diameter.     CT abd without contrast 4/4  IMPRESSION:   1. No evidence of ischemic bowel, obstruction, or intra-abdominal  infection.  2. Bibasilar patchy pulmonary opacities likely represent combination  of aspiration and atelectasis.  3. Small amount of simple free fluid within the lower abdomen.  4. Small left retroperitoneal hematoma along the iliac vasculature  likely postprocedural.   5. Unchanged size of bilateral pleural effusions, which now consist of  simple fluid.    Assessment and Plan  51 year old man presented with cardiogenic shock from inferior STEMI s/p RCA aspiration thrombectomy and POBA on 3/26 at [facility] s/p IABP and initiation of Dopamine, also during procedure, CHB s/p temp pacer, emesis/hematmesis and altered mental status s/p intubation transferred here for consideration of mechanical support on 3/27. Had PCI to RCA, LAD and LCx (on ASA and plavix) started on epinephrine in addition to dopamine, post procedure developed distributive shock picture from infection (aspiration pneumonia? Sputum cx growing staph aureus, on vanco and zosyn) vs post-MI SIRS response. Currently in cardiogenic shock with IABP in situ    Card  # Cardiogenic shock 4/3- from underlying 3v CAD-has had high SVR and low CI, complicated by ischemic MR  # Due to inferior wall STEMI. S/P 7 stents to LAD, circ, RCA (angiogram report pending). Now with IABP, temporary pacemaker after 3rd deg heart block in cath lab at [facility] on 3/26   # Small pericardial hemorrhage   - IABP in place  - Hydral 50mg q6H, once his SCr improves will consider starting Nipride for a more stable afterload reduction regimen, and give him an IABP wean trial  - Hold off  on isordil for now  - LVAD work up given SCr improving  - Cortisol normal  - Continue Milrinone 0.375  - Plan to keep CVP 9-12  - f/u blood cultures  - f/u ID recs  - RHC and native heart endomyocardial bx    # Neuro  - alert, follows commands  - WBC stable   - Seroquel 25 mg         Respiratory  #Intubated for airway protection due to mental status and emesis on 3/26, extubated 4/3  #Probable Aspiration PNA/pneumonitis vs MSSA pneumonia         # ID  - UA 4/15 with cloudy urine, 50 WBCs, many bacteria, no nitrates. UCx grew E coli (sens to CFTX - on tx)  - Fever on 4/20 with blood cultures in process, PICC and SGC removed, started on Vanc and pip/tazo  - ID consult placed; appreciate recs         # Endocrine  T2DM: HA1C 7.5 on admission.  - Insulin gtt per nurising protocol with hypoglycemia precautions.       # GI  - Had several maroon BMs since 4/11, Hb overall dropped by a 1.5 grams or so, heparin inf held and GI consulted  - Colonoscopy 4/17 showed a rectal ulcer but no intervenable lesions  - Will continue to monitor Hb  - Protonix 40 PO BID         # Renal/  Renal function slightly improved- from hypoperfusion from cardiogenic shock 4/5 plus central venous congestion plus contrast nephropathy  -Diuresis  - Daily Cr  - Avoid nephrotoxins as able  - Plan to keep CVP 9-12      -Hypernatremia  - Resolved. Stopped his D5W, only getting FWF's.        FEN: feeding tube, and dysphagia level II  Code: FULL  PPx: PPI 40mg BID, senna PRN  Dispo: pending stability    LINES:  - SC IABP  4/19  - LUE PICC   4/20  - Chavis catheter  4/20        Plan of care discussed with Dr. Yue Loving MD  Cardiovascular Disease Fellow  Pager: [phone]

## 2017-04-21 NOTE — PHARMACY-VANCOMYCIN DOSING SERVICE
Pharmacy Vancomycin Note  Date of Service 2017  Patient's  1965   51 year old, male    Indication: Sepsis  Goal Trough Level: 15-20 mg/L  Day of Therapy: 3  Current Vancomycin regimen:  1250 mg IV q12h    Current estimated CrCl = Estimated Creatinine Clearance: 63.6 mL/min (based on Cr of 1.17).    Creatinine for last 3 days  2017:  3:40 AM Creatinine 1.08 mg/dL; 11:59 AM Creatinine 1.07 mg/dL;  4:03 PM Creatinine 1.02 mg/dL  2017:  3:45 AM Creatinine 1.04 mg/dL;  4:39 PM Creatinine 0.99 mg/dL  2017:  4:19 AM Creatinine 1.17 mg/dL    Recent Vancomycin Levels (past 3 days)  2017: 11:18 AM Vancomycin Level 20.6 mg/L    Vancomycin IV Administrations (past 72 hours)                   vancomycin (VANCOCIN) 1,250 mg in NaCl 0.9 % 250 mL intermittent infusion (mg) 1,250 mg New Bag 17 2333     1,250 mg New Bag  1206    vancomycin (VANCOCIN) 1,500 mg in NaCl 0.9 % 250 mL intermittent infusion (mg) 1,500 mg New Bag 17 2252                Nephrotoxins and other renal medications (Future)    Start     Dose/Rate Route Frequency Ordered Stop    17 1030  vancomycin (VANCOCIN) 1,250 mg in NaCl 0.9 % 250 mL intermittent infusion      1,250 mg Intravenous EVERY 12 HOURS 17 2218      17 2200  piperacillin-tazobactam (ZOSYN) 4.5 g vial to attach to  mL bag      4.5 g  over 1 Hours Intravenous EVERY 6 HOURS 17 2149               Contrast Orders - past 72 hours     None          Interpretation of levels and current regimen:  Trough level is  Supratherapeutic    Has serum creatinine changed > 50% in last 72 hours: No    Urine output:  good urine output    Renal Function: Worsening    Plan:  1.  Decrease Dose to 1250 mg IV q18 hours  2.  Pharmacy will check trough levels as appropriate in 1-3 Days.    3. Serum creatinine levels will be ordered daily for the first week of therapy and at least twice weekly for subsequent weeks.      Uma Arredondo, PharmD IV  Student        .

## 2017-04-21 NOTE — PROGRESS NOTES
General Infectious Disease Service Progress Note       Patient:  Luke Henao   Date of birth 1965, Medical record number [id number]  Date of Visit:  04/21/2017  Date of Admission: 3/27/2017           Assessment and Recommendations:   ASSESSMENT:  Luke Henao is a 51 year old Turkish man with history of diabetes, now with cardiogenic shock from an inferior STEMI, s/p RCA aspiration thrombectomy and multiple stent placements. He is now inotrope and IABP-dependent, and spiking intermittent fevers. Possible etiology of shock includes cardiogenic +/- distributive with likely infection. CXR consistent with possible RUL opacity, concerning for HCAP, though UA with pyuria but the Ucx was negative on 4/19/2017.   Recent urine cx positive for E.coli and endotracheal samples with Staph aureus and Haemophilus influenzae.   We suspect the fevers may be due to HCAP, and agree with broad-spectrum antibiotics for now.  The pyuria might be due to indwelling dawkins.     RECOMMENDATIONS:    Continue vancomycin and Zosyn for now.     Sputum Gram stain and cx if possible.     Will follow on repeat Ucx from the new dawkins.     Dr. Shetty is available over the weekend for questions, I will be back on Monday to reassume the patient's care.            Interim History:   No new complaints today. No events overnight.   Now afebrile.          History of Present Illness:   Luke Henao is a 51 year old Turkish man with history of diabetes, who presented to [facility] via EMS on 3/28/17. He complained initially of a 2 day history of flu-like symptoms, with nausea and vomiting. On EMS arrival, he was noted to by hypotensive, and EKG was concerning for inferior STEMI. He went to the cath lab urgently, found to have severe LAD disease and LCx stenosis. He underwent aspiration thrombectomy and POBA of 2 sites, felt to need CABG, though thought to be a poor surgical candidate. While in the cath lab, was noted to be persistently  hypotensive, and had an episode of hematemesis. He also developed complete heart block with bradycardia, and a temporary pacemaker and IABP were placed. He was started on dopamine and then transferred to [facility] for further management. On arrival, he underwent PCI with 7 stents placed. Since then, he has remained hypotensive and IABP-dependent, now on inotropes with replacement of a subclavian IABP.     The infectious disease team has been consulted for assistance in managing antimicrobials given intermittent fevers. Hospital course notable for fevers on 3/27 and 3/28 (with sputum culture growing Staph aureus and Haemophilus influenzae), thought secondary to possible aspiration pneumonia, and treated with vancomycin + zosyn, narrowed to Unasyn alone 3/30 through 4/3, then broadened again to vancomycin + Zosyn from 4/3 through 4/4), with Zosyn alone from 4/4 through 4/7. He was fever free and off all antibiotics from 4/7 through 4/15. Urine culture from 4/12 grew 10-50K colonies of E.coli. He spiked a low-grade fever on 4/15 and was started back on vancomycin + Zosyn, narrowed to ceftriaxone alone on 4/16 (to cover the E.coli UTI) through 4/19. Subclavian IABP was placed on 4/19, though he spiked a high fever to 102.2 and was broadened to vancomycin + zosyn again. PICC line replaced today, and Sherwood Benjy catheter removed.    Today, the patient reports his biggest concern is pain in the RUE surrounding the PICC site. A new PICC has been placed in his LUE, though the old line remained in place until all IV access was secured. He denies nausea, vomiting, abdominal pain, constipation, diarrhea, headaches, SOB, chest pain or cough. No rashes or concerning lesions. He denies any dysuria, though urinary catheter remains in place.     Antibiotic History:    Vancomycin + Zosyn (3/27 - 3/30)    Unasyn (3/30 - 4/3)    Vancomycin + Zosyn (4/3 - 4/4)    Zosyn (4/4 - 4/7)    Vancomycin + Zosyn (4/15 - 4/16)    Ceftriaxone (4/16 -  4/19)    Vancomycin + Zosyn (4/19 - current)    Notable Microbiology:    Sputum (endotracheal) sample from 3/28 with Staph aureus and Haemophilus influenzae    Influenza A/B negative on 3/28    Sputum (endotracheal) sample from 3/29 with Staph aureus    C.diff PCR negative on 4/7    Urine culture from 4/12 with 10-50K colonies Escherichia coli (penicillin and fluoroquinolone-resistant)    Urine culture from 4/15 with >100K colonies E. Coli (penicillin and fluoroquinolone-resistant)    Multiple blood cultures this hospitalization remain NGTD           Review of Systems:   A full 10-point review of systems was obtained and is negative except for the mentioned in the HPI above.           Past Medical History:     Patient Active Problem List   Diagnosis     STEMI (ST elevation myocardial infarction) (H)     Acute MI (H)     Cardiogenic shock (H)            Past Surgical History:     Past Surgical History:   Procedure Laterality Date     COLONOSCOPY N/A 4/17/2017    Procedure: COLONOSCOPY;  Surgeon: Rashaad Bundy MD;  Location: [facility]     INSERT INTRAAORTIC BALLOON PUMP Right 4/19/2017    Procedure: INSERT INTRAAORTIC BALLOON PUMP;  Right Subclavian Intra Aortic Balloon Pump Insertion using Maquet 40cc Ballon Catheter, Implentation of 8mm Gelweave Woven Vascular Prosthesis, Removal of Left Femoral Ballon Pump Catheter, Flouroscopy;  Surgeon: Keshav Leung MD;  Location:  OR            Family History:   No family history on file.         Social History:     Social History   Substance Use Topics     Smoking status: Current Every Day Smoker     Packs/day: 0.50     Types: Cigarettes     Smokeless tobacco: Not on file     Alcohol use Not on file     History   Sexual Activity     Sexual activity: Not on file            Current Medications (antimicrobials listed in bold):       aspirin  324 mg Oral or Feeding Tube Daily     heparin lock flush  5-10 mL Intracatheter Q24H     QUEtiapine  12.5 mg Oral At  Bedtime     piperacillin-tazobactam  4.5 g Intravenous Q6H     vancomycin (VANCOCIN) IV  1,250 mg Intravenous Q12H     pantoprazole  40 mg Per Feeding Tube BID     zinc sulfate (20 mg [place]. Zn/mL)  220 mg Per Feeding Tube Daily     vitamin A-D & C drops  7 mL Per Feeding Tube BID     atorvastatin  40 mg Oral or Feeding Tube Daily at 8 pm     pneumococcal vaccine  0.5 mL Intramuscular Once     sodium chloride (PF)  3 mL Intracatheter Q8H     multivitamins with minerals  15 mL Per Feeding Tube Daily            Allergies:   No Known Allergies         Physical Exam:   Ranges for vital signs:  Temp:  [97.9  F (36.6  C)-100.1  F (37.8  C)] 98.8  F (37.1  C)  Heart Rate:  [120-140] 131  Resp:  [11-34] 16  BP: (126-137)/(74-88) 137/74  MAP:  [70 mmHg-102 mmHg] 88 mmHg  Arterial Line BP: ()/(52-80) 96/68  SpO2:  [92 %-100 %] 100 %    Physical Examination:  GENERAL:  Alert, interactive, lying in bed in no acute distress.   HEENT:  Head is normocephalic, atraumatic.   EYES:  Eyes have anicteric sclerae without conjunctival injection or stigmata of endocarditis.    ENT:  Oropharynx is moist without exudates or ulcers. Tongue is midline. Dentition intact. NG in place.  LUNGS:  CTAB bilaterally anteriorly, no wheezes. Normal respiratory effort on 2 L NC.  CARDIOVASCULAR:  Tachycardic, regular rhythm. Murmur/bruit in all auscultating areas, could be IABP noise.  ABDOMEN:  Normal bowel sounds, soft, non-tender. Non-distended. No appreciable hepatosplenomegaly.  : Chavis in place draining clear yellow urine.  SKIN:  No acute rashes. Site of old IABP in right groin is clear and dry, with no erythema or purulence noted. Lines are in place without any surrounding erythema or exudate. RUE PICC line is tender. No stigmata of endocarditis.  EXTREMITIES: Mild pedal edema bilaterally. Wearing SCDs.  NEUROLOGIC:  Grossly nonfocal. Alert and oriented. Moves all extremities.           Laboratory Data:     Inflammatory Markers    Recent  Labs   Lab Test  04/08/17   0425   CRP  18.0*       Hematology Studies    Recent Labs   Lab Test  04/21/17   0419  04/20/17   2048  04/20/17   0345  04/19/17   2150  04/19/17   1159  04/19/17   0903  04/19/17   0340  04/18/17   0329   03/27/17   0551   03/26/17   2253  03/26/17   1800   WBC  13.1*  10.7  9.7   --   8.3   --   6.4  7.1   < >  15.9*   < >  18.6*  14.3*   ANEU   --    --    --    --    --    --    --    --    --   10.7*   --   15.4*  12.1*   AEOS   --    --    --    --    --    --    --    --    --   0.0   --   0.0  0.0   HGB  8.1*  7.9*  7.9*  8.2*  8.3*  8.2*  8.7*  8.0*   < >  13.1*   < >  13.6  15.7   MCV  100  100  100   --   99   --   98  99   < >  90   < >  91  90   PLT  201  182  181   --   157   --   153  136*   < >  177   < >  196  204    < > = values in this interval not displayed.       Immune Globulin Studies    Recent Labs   Lab Test  04/18/17   2349   IGG  1610   IGM  63   IGA  394*       Metabolic Studies     Recent Labs   Lab Test  04/21/17   0838  04/21/17   0419  04/20/17   2359  04/20/17   2048  04/20/17   1949  04/20/17   1639  04/20/17   0345  04/19/17   1603  04/19/17   1159   NA   --   143   --    --    --   141  145*  142  142   POTASSIUM  3.5  3.6  4.0  3.5  Canceled, Test credited   Results questioned - new specimen has been requested   Unsatisfactory specimen - contaminated  NOTIFIIED ERIC RAM AT 4E,4/10/17 @ 2054 BY SY    3.4  3.8  3.6  3.9   CHLORIDE   --   105   --    --    --   108  110*  106  108   CO2   --   33*   --    --    --   26  27  28  25   BUN   --   16   --    --    --   19  22  22  23   CR   --   1.17   --    --    --   0.99  1.04  1.02  1.07   GFRESTIMATED   --   66   --    --    --   80  76  77  73       Hepatic Studies    Recent Labs   Lab Test  04/21/17   0419  04/20/17   0345  04/19/17   0340  04/18/17   0329  04/17/17   0404  04/16/17   0350   BILITOTAL  0.5  0.4  0.5  0.6  0.6  0.9   ALKPHOS  63  57  63  64  55  62   ALBUMIN  2.3*  2.1*  2.2*   2.1*  2.2*  2.2*   AST  21  27  40  20  11  21   ALT  38  43  51  38  36  40       Thyroid Studies    Recent Labs   Lab Test  04/14/17   1751  04/08/17   0425   TSH  2.54  1.33       Microbiology:  Culture Micro   Date Value Ref Range Status   04/20/2017 Pending  Incomplete   04/20/2017   Final    Canceled, Test credited Quantity not sufficient Notification of test cancellation   was given to ERIC ROSENBAUM ON UU4E, REORDERED FOR RN TO RECOLLECT     04/20/2017 No growth after 20 hours  Final   04/19/2017 No growth after 2 days  Final   04/19/2017 No growth after 2 days  Final   04/19/2017 No growth  Final   04/15/2017 No growth  Final   04/15/2017 >100,000 colonies/mL Escherichia coli (A)  Final   04/15/2017 Canceled, Test credited Duplicate request  Final   04/12/2017 10,000 to 50,000 colonies/mL Escherichia coli (A)  Final   04/12/2017 No growth  Final   04/12/2017 No growth  Final   04/05/2017 No growth  Final   04/03/2017 No growth  Final   04/03/2017 No growth  Final   04/03/2017 No growth  Final   04/03/2017 No growth  Final   03/29/2017 (A)  Final    Moderate growth Staphylococcus aureus Susceptibility testing done on previous   specimen     03/29/2017 No growth  Final   03/28/2017 No growth  Final   03/28/2017 (A)  Final    Heavy growth Staphylococcus aureus  Heavy growth Haemophilus influenzae Beta lactamase negative Beta-lactamase   negative Haemophilus influenzae are usually susceptible to ampicillin,   amoxacillin/clavulanic acid, levofloxacin, and 3rd generation cephalosporins,   such as ceftriaxone.  Light growth Normal brianda     03/27/2017 No growth  Final   03/26/2017 No growth  Final   03/26/2017 No growth  Final       Urine Studies    Recent Labs   Lab Test  04/20/17   1711  04/19/17   2214  04/15/17   0135  04/12/17   1852  04/08/17   0705   LEUKEST  Large*  Large*  Large*  Moderate*  Negative   WBCU  73*  59*  50*  5*  1       Vancomycin Levels    Recent Labs   Lab Test  04/07/17   0403  04/05/17    2001 03/31/17   0321   VANCOMYCIN  14.1  25.3*  29.1*       Hepatitis B Testing   Recent Labs   Lab Test  04/15/17   0350   HBCAB  Reactive   A reactive result indicates acute, chronic or past/resolved hepatitis B   infection.  *   HEPBANG  Nonreactive     Hepatitis C Testing     Hepatitis C Antibody   Date Value Ref Range Status   04/15/2017  NR Final    Nonreactive   Assay performance characteristics have not been established for newborns,   infants, and children              Relevant Imaging:   CXR 4/21/2017 reviewed by myself   Impression:   1. Intra-aortic balloon pump tip over the descending thoracic aorta  approximately 1.4 cm is above the suzanne.  2. Previous left IJ central venous catheter and right PICC have been  removed.  3. Perihilar and left retrocardiac opacities. Slight increase in right  upper lung. Differential infection, atelectasis, pulmonary edema.      ECHOCARDIOGRAM, 3/27/17:  The visual ejection fraction is estimated at 30-35%.  There is extensive inferior, inferoseptal, and inferolateral wall akinesis.  Basal/mid lateral wall hypokinesis  There is moderate to severe septal hypokinesis.  Septal wall motion abnormality may reflect pacemaker activation.  There is a catheter/pacemaker lead seen in the right ventricle.  The right ventricle is mildly dilated.  Severely decreased right ventricular systolic function  There is no pericardial effusion.  The rhythm was paced.  Compared to the prior study, the LVEF appears somewhat decreased. Septal wall  motion abnormality larger- may reflect, in part, that patient in sinus tach on  prior study, and ventricular paced now. No hemodynamically significant  valvular abnormalities on 2D or color flow imaging      CT CHEST/ABDOMEN/PELVIS, 3/27/17:  IMPRESSION:   1. Small mediastinal hemorrhage, small bilateral pleural effusions  which may be hemorrhagic. Small intraperitoneal hemorrhage. Minimal  pericardial hemorrhage.  2. Minimal pneumoperitoneum in the  left paracolic gutter, of unknown  significance. No pneumatosis as clinically questioned.  3. IABP slightly low in position.  4. Bilateral pulmonary dependent consolidations represent compression  atelectasis, however differentials include aspiration.      ECHOCARDIOGRAM, 3/29/17:  Limited study. Ischemic CM. Moderately (EF 30-35%) reduced left ventricular  function is present. Traced at 32%.  Posterior wall akinesis is present. Lateral wall akinesis is present. Inferior  wall akinesis is present.  Right ventricular function, chamber size, wall motion, and thickness are  normal.  Dilation of the inferior vena cava is present with abnormal respiratory  variation in diameter.  Compared to prior study, RV fxn is improved.      ECHOCARDIOGRAM, 3/31/17:  The Ejection Fraction is estimated at 50-55%. Inferior,posterior,lateral,basal  septal wall akinesis as reported before. No change.      ECHOCARDIOGRAM, 4/3/17:  Left ventricular size is normal.  The Ejection Fraction is estimated at 35-40%.  Inferior wall akinesis is present.  Lateral wall akinesis is present.  Posterior wall akinesis is present.  Right ventricular function, chamber size, wall motion, and thickness are  normal.  Moderate mitral insufficiency is present.  Moderate tricuspid insufficiency is present.  Right ventricular systolic pressure is 47mmHg above the right atrial pressure.  The inferior vena cava is normal.      CT ABDOMEN/PELVIS W/OUT CONTRAST, 4/4/17:  IMPRESSION:   1. No evidence of ischemic bowel, obstruction, or intra-abdominal  infection.  2. Bibasilar patchy pulmonary opacities likely represent combination  of aspiration and atelectasis.  3. Small amount of simple free fluid within the lower abdomen.  4. Small left retroperitoneal hematoma along the iliac vasculature  likely postprocedural.   5. Unchanged size of bilateral pleural effusions, which now consist of  simple fluid.    ECHOCARDIOGRAM, 4/5/17:  Moderately (EF 35-40%) reduced left  ventricular function is present. Traced at 40%.  Moderate mitral insufficiency is present.  The cause of the mitral insufficiency appears to be restricted posterior  leaflet from posterior MI. No mitral leaflet pathology seen.  Dilation of the inferior vena cava is present with abnormal respiratory  variation in diameter.      US ABDOMEN, 4/6/17:  COMPARISON: CT 4/4/2017  HISTORY: Assess for gallbladder infection  IMPRESSION:   1. No acute findings. No evidence of cholecystitis.  2. Mildly echogenic liver parenchyma, consistent with intrinsic  hepatic disease, such as hepatic steatosis.     CT ABDOMEN PELVIS W/O CONTRAST, 4/11/2017 6:39 PM  COMPARISON: CT on 4/4/2017.  HISTORY: abdominal pain and maroon BMs.  Impression:  1. Stable small retroperitoneal hemorrhage adjacent to left iliac  vessels in the left low pelvis.   2. Slightly improved small bilateral pleural effusions and associated  bibasilar opacities, likely representing pulmonary edema.  3. Previously seen intraperitoneal fluid has resolved.  4. Intra-aortic balloon pump in the descending thoracic aorta,  superior tip out of view.    XR CHEST PORT 1 VW, 4/19/2017 10:19 PM  Indication: fever  Comparison: Chest x-ray on 4/19/2017.  Impression:   1. Intra-aortic balloon pump tip slightly more superior compared to  prior, chest above the level of the suzanne.  2. Slightly worsened left basilar opacities; infection or atelectasis.  3. Stable perihilar opacities and tiny bilateral pleural effusions.  Likely mild pulmonary edema.

## 2017-04-21 NOTE — PLAN OF CARE
Problem: Goal Outcome Summary  Goal: Goal Outcome Summary  OT 4E: cancel - pt off the unit for RHC upon attempt. Will reschedule per POC.

## 2017-04-21 NOTE — PROGRESS NOTES
Social Work Services Progress Note    Hospital Day: 26  Date of Initial Social Work Evaluation:  3/28/17  Collaborated with:  Pt's dtr Camtu, Nursing, team rounds    Data:    Pt remains in ICU on subclavian balloon pump.    Intervention:    Met with pt's dtr Teratu per her request.  She states pt's wife had initially thought they had completed short-term disability paperwork for pt's employer but hadn't.  Camtu provided SW with both short-term disability and FMLA paperwork.  Camtu states completion is not urgent for today so discussed with her that paperwork will likely be completed early next week, which she is agreeable to.    Assessment:   Camtu states she is doing well and appears to remain in good spirits despite length of stay.    Plan:    Anticipated Disposition:  Facility:  TBD pending medical course, but likely will need rehab.    Barriers to d/c plan:  Medical stability.    Follow Up:  SW will f/u Monday to ensure completion of short-term disability and FMLA paperwork.    LUIS Villafana, [facility]  Adult ICU Clinical   Pager [phone]

## 2017-04-21 NOTE — PLAN OF CARE
Problem: Goal Outcome Summary  Goal: Goal Outcome Summary  SLP: Patient NPO for procedure this date. Consulted with RN. Will reschedule for 4-.

## 2017-04-22 ENCOUNTER — APPOINTMENT (OUTPATIENT)
Dept: OCCUPATIONAL THERAPY | Facility: CLINIC | Age: 52
DRG: 228 | End: 2017-04-22
Attending: INTERNAL MEDICINE
Payer: COMMERCIAL

## 2017-04-22 ENCOUNTER — APPOINTMENT (OUTPATIENT)
Dept: SPEECH THERAPY | Facility: CLINIC | Age: 52
DRG: 228 | End: 2017-04-22
Attending: INTERNAL MEDICINE
Payer: COMMERCIAL

## 2017-04-22 ENCOUNTER — APPOINTMENT (OUTPATIENT)
Dept: GENERAL RADIOLOGY | Facility: CLINIC | Age: 52
DRG: 228 | End: 2017-04-22
Attending: INTERNAL MEDICINE
Payer: COMMERCIAL

## 2017-04-22 LAB
ALBUMIN SERPL-MCNC: 1.9 G/DL (ref 3.4–5)
ALP SERPL-CCNC: 63 U/L (ref 40–150)
ALT SERPL W P-5'-P-CCNC: 49 U/L (ref 0–70)
ANION GAP SERPL CALCULATED.3IONS-SCNC: 7 MMOL/L (ref 3–14)
ANION GAP SERPL CALCULATED.3IONS-SCNC: 8 MMOL/L (ref 3–14)
AST SERPL W P-5'-P-CCNC: 49 U/L (ref 0–45)
BACTERIA SPEC CULT: NO GROWTH
BASE EXCESS BLDA CALC-SCNC: 6.9 MMOL/L
BASE EXCESS BLDV CALC-SCNC: 10.9 MMOL/L
BASE EXCESS BLDV CALC-SCNC: 6.5 MMOL/L
BASE EXCESS BLDV CALC-SCNC: 7.8 MMOL/L
BASE EXCESS BLDV CALC-SCNC: 8.3 MMOL/L
BASE EXCESS BLDV CALC-SCNC: 8.3 MMOL/L
BASE EXCESS BLDV CALC-SCNC: 8.6 MMOL/L
BASE EXCESS BLDV CALC-SCNC: 9.2 MMOL/L
BILIRUB DIRECT SERPL-MCNC: 0.2 MG/DL (ref 0–0.2)
BILIRUB SERPL-MCNC: 0.5 MG/DL (ref 0.2–1.3)
BUN SERPL-MCNC: 22 MG/DL (ref 7–30)
BUN SERPL-MCNC: 22 MG/DL (ref 7–30)
CA-I BLD-MCNC: 4.4 MG/DL (ref 4.4–5.2)
CALCIUM SERPL-MCNC: 7.6 MG/DL (ref 8.5–10.1)
CALCIUM SERPL-MCNC: 7.8 MG/DL (ref 8.5–10.1)
CHLORIDE SERPL-SCNC: 103 MMOL/L (ref 94–109)
CHLORIDE SERPL-SCNC: 104 MMOL/L (ref 94–109)
CO2 SERPL-SCNC: 30 MMOL/L (ref 20–32)
CO2 SERPL-SCNC: 31 MMOL/L (ref 20–32)
CREAT SERPL-MCNC: 1.14 MG/DL (ref 0.66–1.25)
CREAT SERPL-MCNC: 1.24 MG/DL (ref 0.66–1.25)
ERYTHROCYTE [DISTWIDTH] IN BLOOD BY AUTOMATED COUNT: 17 % (ref 10–15)
ERYTHROCYTE [DISTWIDTH] IN BLOOD BY AUTOMATED COUNT: 17.4 % (ref 10–15)
GFR SERPL CREATININE-BSD FRML MDRD: 62 ML/MIN/1.7M2
GFR SERPL CREATININE-BSD FRML MDRD: 68 ML/MIN/1.7M2
GLUCOSE BLDC GLUCOMTR-MCNC: 104 MG/DL (ref 70–99)
GLUCOSE BLDC GLUCOMTR-MCNC: 106 MG/DL (ref 70–99)
GLUCOSE BLDC GLUCOMTR-MCNC: 108 MG/DL (ref 70–99)
GLUCOSE BLDC GLUCOMTR-MCNC: 115 MG/DL (ref 70–99)
GLUCOSE BLDC GLUCOMTR-MCNC: 121 MG/DL (ref 70–99)
GLUCOSE BLDC GLUCOMTR-MCNC: 123 MG/DL (ref 70–99)
GLUCOSE BLDC GLUCOMTR-MCNC: 124 MG/DL (ref 70–99)
GLUCOSE BLDC GLUCOMTR-MCNC: 127 MG/DL (ref 70–99)
GLUCOSE BLDC GLUCOMTR-MCNC: 138 MG/DL (ref 70–99)
GLUCOSE BLDC GLUCOMTR-MCNC: 138 MG/DL (ref 70–99)
GLUCOSE BLDC GLUCOMTR-MCNC: 149 MG/DL (ref 70–99)
GLUCOSE BLDC GLUCOMTR-MCNC: 152 MG/DL (ref 70–99)
GLUCOSE BLDC GLUCOMTR-MCNC: 175 MG/DL (ref 70–99)
GLUCOSE BLDC GLUCOMTR-MCNC: 189 MG/DL (ref 70–99)
GLUCOSE BLDC GLUCOMTR-MCNC: 201 MG/DL (ref 70–99)
GLUCOSE BLDC GLUCOMTR-MCNC: 214 MG/DL (ref 70–99)
GLUCOSE SERPL-MCNC: 104 MG/DL (ref 70–99)
GLUCOSE SERPL-MCNC: 243 MG/DL (ref 70–99)
HCO3 BLD-SCNC: 32 MMOL/L (ref 21–28)
HCO3 BLDV-SCNC: 32 MMOL/L (ref 21–28)
HCO3 BLDV-SCNC: 33 MMOL/L (ref 21–28)
HCO3 BLDV-SCNC: 34 MMOL/L (ref 21–28)
HCO3 BLDV-SCNC: 36 MMOL/L (ref 21–28)
HCT VFR BLD AUTO: 25.4 % (ref 40–53)
HCT VFR BLD AUTO: 26.1 % (ref 40–53)
HGB BLD-MCNC: 7.8 G/DL (ref 13.3–17.7)
HGB BLD-MCNC: 7.9 G/DL (ref 13.3–17.7)
LMWH PPP CHRO-ACNC: 0.13 IU/ML
LMWH PPP CHRO-ACNC: 0.2 IU/ML
LMWH PPP CHRO-ACNC: 0.37 IU/ML
Lab: NORMAL
MAGNESIUM SERPL-MCNC: 2.1 MG/DL (ref 1.6–2.3)
MAGNESIUM SERPL-MCNC: 2.2 MG/DL (ref 1.6–2.3)
MCH RBC QN AUTO: 29.7 PG (ref 26.5–33)
MCH RBC QN AUTO: 30.7 PG (ref 26.5–33)
MCHC RBC AUTO-ENTMCNC: 29.9 G/DL (ref 31.5–36.5)
MCHC RBC AUTO-ENTMCNC: 31.1 G/DL (ref 31.5–36.5)
MCV RBC AUTO: 99 FL (ref 78–100)
MCV RBC AUTO: 99 FL (ref 78–100)
MICRO REPORT STATUS: NORMAL
O2/TOTAL GAS SETTING VFR VENT: ABNORMAL %
OXYHGB MFR BLD: 97 % (ref 92–100)
OXYHGB MFR BLDV: 44 %
OXYHGB MFR BLDV: 44 %
OXYHGB MFR BLDV: 45 %
OXYHGB MFR BLDV: 47 %
OXYHGB MFR BLDV: 47 %
OXYHGB MFR BLDV: 48 %
OXYHGB MFR BLDV: 50 %
PCO2 BLD: 45 MM HG (ref 35–45)
PCO2 BLDV: 46 MM HG (ref 40–50)
PCO2 BLDV: 49 MM HG (ref 40–50)
PCO2 BLDV: 50 MM HG (ref 40–50)
PCO2 BLDV: 51 MM HG (ref 40–50)
PCO2 BLDV: 51 MM HG (ref 40–50)
PCO2 BLDV: 52 MM HG (ref 40–50)
PCO2 BLDV: 53 MM HG (ref 40–50)
PH BLD: 7.46 PH (ref 7.35–7.45)
PH BLDV: 7.41 PH (ref 7.32–7.43)
PH BLDV: 7.41 PH (ref 7.32–7.43)
PH BLDV: 7.42 PH (ref 7.32–7.43)
PH BLDV: 7.42 PH (ref 7.32–7.43)
PH BLDV: 7.44 PH (ref 7.32–7.43)
PH BLDV: 7.45 PH (ref 7.32–7.43)
PH BLDV: 7.47 PH (ref 7.32–7.43)
PHOSPHATE SERPL-MCNC: 3.1 MG/DL (ref 2.5–4.5)
PHOSPHATE SERPL-MCNC: 3.2 MG/DL (ref 2.5–4.5)
PLATELET # BLD AUTO: 206 10E9/L (ref 150–450)
PLATELET # BLD AUTO: 237 10E9/L (ref 150–450)
PO2 BLD: 112 MM HG (ref 80–105)
PO2 BLDV: 28 MM HG (ref 25–47)
PO2 BLDV: 29 MM HG (ref 25–47)
PO2 BLDV: 30 MM HG (ref 25–47)
PO2 BLDV: 30 MM HG (ref 25–47)
POTASSIUM BLD-SCNC: 3.9 MMOL/L (ref 3.4–5.3)
POTASSIUM BLD-SCNC: 4.2 MMOL/L (ref 3.4–5.3)
POTASSIUM SERPL-SCNC: 4 MMOL/L (ref 3.4–5.3)
POTASSIUM SERPL-SCNC: 4 MMOL/L (ref 3.4–5.3)
PROT SERPL-MCNC: 6.5 G/DL (ref 6.8–8.8)
RBC # BLD AUTO: 2.57 10E12/L (ref 4.4–5.9)
RBC # BLD AUTO: 2.63 10E12/L (ref 4.4–5.9)
SODIUM SERPL-SCNC: 140 MMOL/L (ref 133–144)
SODIUM SERPL-SCNC: 142 MMOL/L (ref 133–144)
SPECIMEN SOURCE: NORMAL
WBC # BLD AUTO: 7.9 10E9/L (ref 4–11)
WBC # BLD AUTO: 8.8 10E9/L (ref 4–11)

## 2017-04-22 PROCEDURE — 25000128 H RX IP 250 OP 636: Performed by: PEDIATRICS

## 2017-04-22 PROCEDURE — 97110 THERAPEUTIC EXERCISES: CPT | Mod: GO | Performed by: OCCUPATIONAL THERAPIST

## 2017-04-22 PROCEDURE — 25000131 ZZH RX MED GY IP 250 OP 636 PS 637: Performed by: STUDENT IN AN ORGANIZED HEALTH CARE EDUCATION/TRAINING PROGRAM

## 2017-04-22 PROCEDURE — 25000132 ZZH RX MED GY IP 250 OP 250 PS 637: Performed by: STUDENT IN AN ORGANIZED HEALTH CARE EDUCATION/TRAINING PROGRAM

## 2017-04-22 PROCEDURE — 25000125 ZZHC RX 250: Performed by: INTERNAL MEDICINE

## 2017-04-22 PROCEDURE — 25000132 ZZH RX MED GY IP 250 OP 250 PS 637: Performed by: INTERNAL MEDICINE

## 2017-04-22 PROCEDURE — 25000132 ZZH RX MED GY IP 250 OP 250 PS 637

## 2017-04-22 PROCEDURE — S0171 BUMETANIDE 0.5 MG: HCPCS | Performed by: INTERNAL MEDICINE

## 2017-04-22 PROCEDURE — 40000014 ZZH STATISTIC ARTERIAL MONITORING DAILY

## 2017-04-22 PROCEDURE — 40000076 ZZH STATISTIC IABP MONITORING

## 2017-04-22 PROCEDURE — 80076 HEPATIC FUNCTION PANEL: CPT | Performed by: INTERNAL MEDICINE

## 2017-04-22 PROCEDURE — 92526 ORAL FUNCTION THERAPY: CPT | Mod: GN | Performed by: SPEECH-LANGUAGE PATHOLOGIST

## 2017-04-22 PROCEDURE — 99291 CRITICAL CARE FIRST HOUR: CPT | Mod: GC | Performed by: INTERNAL MEDICINE

## 2017-04-22 PROCEDURE — 80048 BASIC METABOLIC PNL TOTAL CA: CPT | Performed by: INTERNAL MEDICINE

## 2017-04-22 PROCEDURE — 40000225 ZZH STATISTIC SLP WARD VISIT: Performed by: SPEECH-LANGUAGE PATHOLOGIST

## 2017-04-22 PROCEDURE — 20000004 ZZH R&B ICU UMMC

## 2017-04-22 PROCEDURE — 97530 THERAPEUTIC ACTIVITIES: CPT | Mod: GO | Performed by: OCCUPATIONAL THERAPIST

## 2017-04-22 PROCEDURE — 40000133 ZZH STATISTIC OT WARD VISIT: Performed by: OCCUPATIONAL THERAPIST

## 2017-04-22 PROCEDURE — 40000196 ZZH STATISTIC RAPCV CVP MONITORING

## 2017-04-22 PROCEDURE — 82805 BLOOD GASES W/O2 SATURATION: CPT | Performed by: INTERNAL MEDICINE

## 2017-04-22 PROCEDURE — 00000146 ZZHCL STATISTIC GLUCOSE BY METER IP

## 2017-04-22 PROCEDURE — 84132 ASSAY OF SERUM POTASSIUM: CPT | Performed by: INTERNAL MEDICINE

## 2017-04-22 PROCEDURE — 85027 COMPLETE CBC AUTOMATED: CPT | Performed by: INTERNAL MEDICINE

## 2017-04-22 PROCEDURE — 85520 HEPARIN ASSAY: CPT | Performed by: INTERNAL MEDICINE

## 2017-04-22 PROCEDURE — 82330 ASSAY OF CALCIUM: CPT | Performed by: INTERNAL MEDICINE

## 2017-04-22 PROCEDURE — 25000128 H RX IP 250 OP 636: Performed by: INTERNAL MEDICINE

## 2017-04-22 PROCEDURE — 71010 XR CHEST PORT 1 VW: CPT

## 2017-04-22 PROCEDURE — 27210437 ZZH NUTRITION PRODUCT SEMIELEM INTERMED LITER

## 2017-04-22 PROCEDURE — 83735 ASSAY OF MAGNESIUM: CPT | Performed by: INTERNAL MEDICINE

## 2017-04-22 PROCEDURE — 40000275 ZZH STATISTIC RCP TIME EA 10 MIN

## 2017-04-22 PROCEDURE — 25000128 H RX IP 250 OP 636: Performed by: STUDENT IN AN ORGANIZED HEALTH CARE EDUCATION/TRAINING PROGRAM

## 2017-04-22 PROCEDURE — 84100 ASSAY OF PHOSPHORUS: CPT | Performed by: INTERNAL MEDICINE

## 2017-04-22 RX ORDER — BUMETANIDE 0.25 MG/ML
2 INJECTION INTRAMUSCULAR; INTRAVENOUS ONCE
Status: COMPLETED | OUTPATIENT
Start: 2017-04-22 | End: 2017-04-22

## 2017-04-22 RX ADMIN — MULTIVIT AND MINERALS-FERROUS GLUCONATE 9 MG IRON/15 ML ORAL LIQUID 15 ML: at 08:46

## 2017-04-22 RX ADMIN — PANTOPRAZOLE SODIUM 40 MG: 40 TABLET, DELAYED RELEASE ORAL at 08:46

## 2017-04-22 RX ADMIN — ASPIRIN 81 MG CHEWABLE TABLET 324 MG: 81 TABLET CHEWABLE at 08:46

## 2017-04-22 RX ADMIN — VANCOMYCIN HYDROCHLORIDE 1250 MG: 10 INJECTION, POWDER, LYOPHILIZED, FOR SOLUTION INTRAVENOUS at 12:12

## 2017-04-22 RX ADMIN — POTASSIUM CHLORIDE 20 MEQ: 1.5 POWDER, FOR SOLUTION ORAL at 08:52

## 2017-04-22 RX ADMIN — PIPERACILLIN AND TAZOBACTAM 4.5 G: 4; .5 INJECTION, POWDER, FOR SOLUTION INTRAVENOUS at 22:01

## 2017-04-22 RX ADMIN — PANTOPRAZOLE SODIUM 40 MG: 40 TABLET, DELAYED RELEASE ORAL at 19:22

## 2017-04-22 RX ADMIN — Medication 7 ML: at 08:46

## 2017-04-22 RX ADMIN — MILRINONE LACTATE 0.38 MCG/KG/MIN: 200 INJECTION, SOLUTION INTRAVENOUS at 08:00

## 2017-04-22 RX ADMIN — OXYCODONE HYDROCHLORIDE 5 MG: 5 TABLET ORAL at 11:03

## 2017-04-22 RX ADMIN — PIPERACILLIN AND TAZOBACTAM 4.5 G: 4; .5 INJECTION, POWDER, FOR SOLUTION INTRAVENOUS at 10:05

## 2017-04-22 RX ADMIN — ACETAMINOPHEN 650 MG: 325 TABLET, FILM COATED ORAL at 23:57

## 2017-04-22 RX ADMIN — POTASSIUM CHLORIDE 20 MEQ: 1.5 POWDER, FOR SOLUTION ORAL at 05:01

## 2017-04-22 RX ADMIN — ATORVASTATIN CALCIUM 40 MG: 40 TABLET, FILM COATED ORAL at 19:21

## 2017-04-22 RX ADMIN — INSULIN GLARGINE 25 UNITS: 100 INJECTION, SOLUTION SUBCUTANEOUS at 11:21

## 2017-04-22 RX ADMIN — INSULIN ASPART 1 UNITS: 100 INJECTION, SOLUTION INTRAVENOUS; SUBCUTANEOUS at 16:50

## 2017-04-22 RX ADMIN — POTASSIUM CHLORIDE 20 MEQ: 1.5 POWDER, FOR SOLUTION ORAL at 17:29

## 2017-04-22 RX ADMIN — ACETAMINOPHEN 650 MG: 325 TABLET, FILM COATED ORAL at 08:46

## 2017-04-22 RX ADMIN — Medication 7 ML: at 19:22

## 2017-04-22 RX ADMIN — OXYCODONE HYDROCHLORIDE 5 MG: 5 TABLET ORAL at 19:21

## 2017-04-22 RX ADMIN — PIPERACILLIN AND TAZOBACTAM 4.5 G: 4; .5 INJECTION, POWDER, FOR SOLUTION INTRAVENOUS at 03:46

## 2017-04-22 RX ADMIN — PIPERACILLIN AND TAZOBACTAM 4.5 G: 4; .5 INJECTION, POWDER, FOR SOLUTION INTRAVENOUS at 16:49

## 2017-04-22 RX ADMIN — BUMETANIDE 2 MG: 0.25 INJECTION INTRAMUSCULAR; INTRAVENOUS at 00:20

## 2017-04-22 RX ADMIN — HUMAN INSULIN 4 UNITS/HR: 100 INJECTION, SOLUTION SUBCUTANEOUS at 02:01

## 2017-04-22 RX ADMIN — Medication 220 MG: at 08:46

## 2017-04-22 RX ADMIN — BUMETANIDE 2 MG: 0.25 INJECTION INTRAMUSCULAR; INTRAVENOUS at 17:29

## 2017-04-22 RX ADMIN — MILRINONE LACTATE 0.38 MCG/KG/MIN: 200 INJECTION, SOLUTION INTRAVENOUS at 22:59

## 2017-04-22 RX ADMIN — HUMAN INSULIN 6.5 UNITS/HR: 100 INJECTION, SOLUTION SUBCUTANEOUS at 11:00

## 2017-04-22 ASSESSMENT — PAIN DESCRIPTION - DESCRIPTORS
DESCRIPTORS: SORE

## 2017-04-22 NOTE — PLAN OF CARE
Problem: Goal Outcome Summary  Goal: Goal Outcome Summary  Outcome: No Change  Patient continues with subclavian IABP therapy at 1:1, 100% augmentation, MAP's 70's. Went down to cath lab for right heart cath and biopsy, tolerated well. CVP down to 11 at last check. Lung sounds improving with diuresis, no crackles heard at 1600. Bumex off around 1300. UOP remains around 100mL/hr. Sinus tachycardia, rhythm 110's-120's most of shift. 1600 CI 2.5. Afebrile this shift. Tube feedings restarted at 1400. Patient tolerating diet order with nectar thick liquids well, needs reminders to go slow and think about each bite. Patient complains of minor pain at IABP insertion. Potassium replaced multiple times. See flowsheets for further details. Family and patient updated at bedside with  present.      Plan: Start heparin therapy tonight, attempt to wean IABP tomorrow morning. Will update service with changes or concerns regarding patient condition.

## 2017-04-22 NOTE — PLAN OF CARE
Problem: Goal Outcome Summary  Goal: Goal Outcome Summary  Outcome: No Change  Progress Note:  Pt remains slightly lethargic but oriented x3, disoriented to time.  IABP 1:1 with EKG trigger and 100% augmentation.  MAP remains 70-80's.  Tmax 100.2, Tylenol given.  Sv02 44 at midnight, pt diuresed with 2 mg Bumex.  CVP still 12 with adequate UO.  Insulin drip titrated to 3 units/hr.  Milrinone at fixed rate of 0.375 mcg/kg/min.  Heparin initiated at 2200,  Increased at 0500 due to sub-therapeutic anti-xa to 900 units/hr.  Family updated last evening.  Goal is to begin weaning of IABP today. Progressing towards goals, continue with plan of care.

## 2017-04-22 NOTE — PLAN OF CARE
Problem: Goal Outcome Summary  Goal: Goal Outcome Summary  4E - At  time pt having IABP turned down and RN recommending holding therapy.

## 2017-04-22 NOTE — PLAN OF CARE
Problem: Goal Outcome Summary  Goal: Goal Outcome Summary  SLP: Pt seen for brief dysphagia f/u as he completed AM meal. Pt, daughter, and RN report intermittent coughing on nectar thick liquids; however, not observed during session.  Recommend continue dysphagia diet level 2 and nectar thick liquids when alert and positioned upright.  Pt should be fed small bites/sips at a slow pace while alternating consistencies.  ST to follow as indicated on POC.

## 2017-04-22 NOTE — PLAN OF CARE
Problem: Goal Outcome Summary  Goal: Goal Outcome Summary  OT: 4E: Pt mod A x2 pivot transfer EOB > chair. Pt completed marching for 2min then 1 min w/ rest break in between and w/ min A x2 for stability throughout, OT cuing pt to bear weight prn on L side 2/2 balloon pump on right subclavian. Pt's MAPS pre and post activyt sitting ~mid 70s, increasing to low 100s during marching, Pt on 3L of O2 thorughout w/ all other VSS throughout.   Rec: rehab to increase ind in ADLS/IADLS

## 2017-04-22 NOTE — PROVIDER NOTIFICATION
Event Note:  D:  Pt sv02 declinin, 48, now 44.  CI 2.1, SVR 1325 per GENE and CVP 14 with UO declining to 30-40 ml/hr.  A:  Cards Fellow updated.  R:  Bumex 2 mg bolus given, will continue to monitor closely and update as needed.

## 2017-04-22 NOTE — PROGRESS NOTES
"[facility]  Cardiology II Service / Advanced Heart Failure        Interval History:   EMB biopsy yesterday without complications, episode of 100.2 temperature overnight (received tylenol without recurrence),received bumex 2mg IV x 1    Intake/Output Summary (Last 24 hours) at 04/22/17 1455  Last data filed at 04/22/17 1400   Gross per 24 hour   Intake          3999.51 ml   Output             2560 ml   Net          1439.51 ml       Pertinent Medications:  I have reviewed this patient's current medications      insulin glargine  25 Units Subcutaneous QAM AC     insulin aspart  1-7 Units Subcutaneous TID AC     insulin aspart  1-5 Units Subcutaneous At Bedtime     vancomycin (VANCOCIN) IV  1,250 mg Intravenous Q18H     aspirin  324 mg Oral or Feeding Tube Daily     heparin lock flush  5-10 mL Intracatheter Q24H     QUEtiapine  12.5 mg Oral At Bedtime     piperacillin-tazobactam  4.5 g Intravenous Q6H     pantoprazole  40 mg Per Feeding Tube BID     zinc sulfate (20 mg [place]. Zn/mL)  220 mg Per Feeding Tube Daily     vitamin A-D & C drops  7 mL Per Feeding Tube BID     atorvastatin  40 mg Oral or Feeding Tube Daily at 8 pm     pneumococcal vaccine  0.5 mL Intramuscular Once     sodium chloride (PF)  3 mL Intracatheter Q8H     multivitamins with minerals  15 mL Per Feeding Tube Daily         Examination:  /74 (BP Location: Left arm)  Temp 98.5  F (36.9  C) (Oral)  Resp 18  Ht 1.575 m (5' 2.01\")  Wt 57.9 kg (127 lb 10.3 oz)  SpO2 100%  BMI 23.34 kg/m2  GEN: A, cooperative and conversational   NECK: can not assess JVP   RESP: crackles   CV: S1S2S3, holossystolic murmur at the apex  ABD: Soft, nontender to palpation, no HSM, +BS, no guarding  EXT: No peripheral edema, warm/well perfused   NEURO: CN II-XII intact, normal 5/5 strength in all extremitites  SKIN: Normal skin turgor, no rash on limited exam    Data:  CMP  Recent Labs  Lab 04/22/17  1157 04/22/17  0816 " 04/22/17  0350 04/21/17  2211 04/21/17  1607  04/21/17  0419  04/20/17 2048 04/20/17  1949  04/20/17  0345  04/19/17  0340   NA  --   --  142 142 143  --  143  --   --   --   < > 145*  < > 143   POTASSIUM 4.2 3.9 4.0 4.0 3.6  < > 3.6  < > 3.5 Canceled, Test credited Results questioned - new specimen has been requested Unsatisfactory specimen - contaminatedNOTIFIIED ERIC RAM AT ,4/10/17 @ 2054 BY SY  < > 3.8  < > 3.9   CHLORIDE  --   --  104 104 103  --  105  --   --   --   < > 110*  < > 107   CO2  --   --  31 32 32  --  33*  --   --   --   < > 27  < > 27   ANIONGAP  --   --  8 7 8  --  6  --   --   --   < > 8  < > 9   GLC  --   --  104* 122* 204*  --  66*  --   --   --   < > 128*  < > 167*   BUN  --   --  22 21 20  --  16  --   --   --   < > 22  < > 24   CR  --   --  1.24 1.23 1.18  --  1.17  --   --   --   < > 1.04  < > 1.08   GFRESTIMATED  --   --  62 62 65  --  66  --   --   --   < > 76  < > 72   GFRESTBLACK  --   --  75 75 79  --  80  --   --   --   < > >90African American GFR Calc  < > 88   ROB  --   --  7.8* 7.3* 7.6*  --  7.6*  --   --   --   < > 7.7*  < > 8.2*   MAG  --   --  2.2 2.2 2.1  --  2.4*  < > 2.0 Canceled, Test credited Results questioned - new specimen has been requested Unsatisfactory specimen - contaminatedNOTIFIIED ERIC RAM AT ,4/10/17 @ 2054 BY SY  < > 2.4*  < > 2.0   PHOS  --   --  3.2  --   --   --  3.4  --  2.0* Canceled, Test credited Results questioned - new specimen has been requested Unsatisfactory specimen - contaminatedNOTIFIIED ERIC RAM AT ,4/10/17 @ 2054 BY SY  --  2.8  < > 2.8   PROTTOTAL  --   --  6.5*  --   --   --  7.0  --   --   --   --  6.4*  --  6.4*   ALBUMIN  --   --  1.9*  --   --   --  2.3*  --   --   --   --  2.1*  --  2.2*   BILITOTAL  --   --  0.5  --   --   --  0.5  --   --   --   --  0.4  --  0.5   ALKPHOS  --   --  63  --   --   --  63  --   --   --   --  57  --  63   AST  --   --  49*  --   --   --  21  --   --   --   --  27  --  40    ALT  --   --  49  --   --   --  38  --   --   --   --  43  --  51   < > = values in this interval not displayed.  CBC    Recent Labs  Lab 04/22/17  0350 04/21/17  1828 04/21/17  0419 04/20/17  2048   WBC 8.8 10.7 13.1* 10.7   RBC 2.57* 2.66* 2.66* 2.55*   HGB 7.9* 8.1* 8.1* 7.9*   HCT 25.4* 26.0* 26.6* 25.5*   MCV 99 98 100 100   MCH 30.7 30.5 30.5 31.0   MCHC 31.1* 31.2* 30.5* 31.0*   RDW 17.4* 17.3* 17.6* 17.9*    189 201 182     INR    Recent Labs  Lab 04/20/17  0345 04/19/17  1159   INR 1.25* 1.23*     Arterial Blood Gas    Recent Labs  Lab 04/22/17  1345 04/22/17  1157 04/22/17  0816 04/22/17  0350  04/21/17  0838  04/19/17  1603 04/19/17  1421   PH  --  7.46*  --   --   --  7.51*  --  7.49* 7.52*   PCO2  --  45  --   --   --  39  --  38 34*   PO2  --  112*  --   --   --  111*  --  125* 135*   HCO3  --  32*  --   --   --  31*  --  29* 28   O2PER 3L 3L  3L 3L 3L  < > 3L  3L  < > 3l 40   < > = values in this interval not displayed.    Imaging Studies:  Echo 3/27  The visual ejection fraction is estimated at 30-35%.  There is extensive inferior, inferoseptal, and inferolateral wall akinesis.  Basal/mid lateral wall hypokinesis  There is moderate to severe septal hypokinesis.  Septal wall motion abnormality may reflect pacemaker activation.  There is a catheter/pacemaker lead seen in the right ventricle.  The right ventricle is mildly dilated.  Severely decreased right ventricular systolic function  There is no pericardial effusion.  The rhythm was paced.  Compared to the prior study, the LVEF appears somewhat decreased. Septal wall  motion abnormality larger- may reflect, in part, that patient in sinus tach on  prior study, and ventricular paced now. No hemodynamically significant  valvular abnormalities on 2D or color flow imaging     CT chest/abdomen 3/27   IMPRESSION:   1. Small mediastinal hemorrhage, small bilateral pleural effusions  which may be hemorrhagic. Small intraperitoneal hemorrhage.  Minimal  pericardial hemorrhage.  2. Minimal pneumoperitoneum in the left paracolic gutter, of unknown  significance. No pneumatosis as clinically questioned.  3. IABP slightly low in position.  4. Bilateral pulmonary dependent consolidations represent compression  atelectasis, however differentials include aspiration.     Cath 3/26 [facility]  SUMMARY:   --Inferior STEMI w/ late presentation. Culprit dictated by complete  heart block, and cardiogenic shock. Emergent echo in the Cath Lab also  shows significant RV dysfunction.  --Three vessel coronary artery disease with diffuse coronary disease  out to the distal vessels  --Successful POBA of the prox and mid-RCA. Stent was not placed, in  anticipation he may need to be considered for bypass surgery in the  near future  --Insertion of a temporary pacemaker for bradycardia (AVB) and  hypotension  --Insertion of a IABP  --Upper GI bleed consistent with [place]     [facility] 3/27     CT head 3/28: normal      Echo 3/29  Interpretation Summary  Limited study. Ischemic CM. Moderately (EF 30-35%) reduced left ventricular  function is present. Traced at 32%.  Posterior wall akinesis is present. Lateral wall akinesis is present. Inferior  wall akinesis is present.  Right ventricular function, chamber size, wall motion, and thickness are  normal.  Dilation of the inferior vena cava is present with abnormal respiratory  variation in diameter.     Compared to prior study, RV fxn is improved.     Echo 3/31  Left Ventricle  The Ejection Fraction is estimated at 50-55%. Inferior,posterior,lateral,basal  septal wall akinesis as reported before. No change.     CORONARY ANGIOGRAM 4/1:   1. Both coronary arteries arise from their respective cusps.  2. Right dominant.  3. LM has mild luminal irregularities.   4. LAD supplies the apex along with the RCA (type 2 LAD) and gives rise to septal perforators and a large and branching D1. There are widely patent stents  extending from the proximal to mid LAD. The dLAD has mild to moderate disease to 30% stenosis. The D1 is jailed by the LAD stents, but has EBER 3 flow with disease to 30% stenosis.   5. The small ramus is no longer present.  6. LCX is occluded at the ostium.   7. RCA gives rise to PL branches and supplies PDA. There are widely patent stents extending from the proximal to mid RCA. The remainder of the mRCA has disease to 50% stenosis and the dRCA has disease to 10% stenosis. The RPDA has a widely patent stent and the remainder of the artery has mild disease to 10% stenosis. The RPLA has small vessels with diffuse disease including a distal branch occlusion. The RPLA supplies some small collateral branches to the dLCx.      COMPLICATIONS:  1. None      SUMMARY:   1. Cardiogenic shock following an inferior STEMI.  2. The LCx re-occlusion is not surprising as the recently revascularized LCx  had poor outflow and the revascularized portion did not supply a large territory (limited to no flow was present distal to the stented segment immediately following stenting). Repeat revascularization of the LCx would result in the same outcome of repeat closure and also risk embolizing thrombus from the LCx into the LAD so no treatment of the occluded LCx was performed.   3. Three vessel coronary artery disease with patent LAD and RCA stents and an occluded LCx.     Echo 4/3  Left ventricular size is normal.  The Ejection Fraction is estimated at 35-40%.  Inferior wall akinesis is present.  Lateral wall akinesis is present.  Posterior wall akinesis is present.  Right ventricular function, chamber size, wall motion, and thickness are  normal.  Moderate mitral insufficiency is present.  Moderate tricuspid insufficiency is present.  Right ventricular systolic pressure is 47mmHg above the right atrial pressure.  The inferior vena cava is normal.     Echo 4/5  Moderately (EF 35-40%) reduced left ventricular function is present.  Traced at  40%.  Moderate mitral insufficiency is present.  The cause of the mitral insufficiency appears to be restricted posterior  leaflet from posterior MI. No mitral leaflet pathology seen.  Dilation of the inferior vena cava is present with abnormal respiratory  variation in diameter.     CT abd without contrast 4/4  IMPRESSION:   1. No evidence of ischemic bowel, obstruction, or intra-abdominal  infection.  2. Bibasilar patchy pulmonary opacities likely represent combination  of aspiration and atelectasis.  3. Small amount of simple free fluid within the lower abdomen.  4. Small left retroperitoneal hematoma along the iliac vasculature  likely postprocedural.   5. Unchanged size of bilateral pleural effusions, which now consist of  simple fluid.    Assessment and Plan  51 year old man presented with cardiogenic shock from inferior STEMI s/p RCA aspiration thrombectomy and POBA on 3/26 at [facility] s/p IABP and initiation of Dopamine, also during procedure, CHB s/p temp pacer, emesis/hematmesis and altered mental status s/p intubation transferred here for consideration of mechanical support on 3/27. Had PCI to RCA, LAD and LCx (on ASA and plavix) started on epinephrine in addition to dopamine, post procedure developed distributive shock picture from infection (aspiration pneumonia? Sputum cx growing staph aureus, on vanco and zosyn) vs post-MI SIRS response. Currently in cardiogenic shock with IABP in situ    Card  # Cardiogenic shock 4/3- from underlying 3v CAD-has had high SVR and low CI, complicated by ischemic MR  # Due to inferior wall STEMI. S/P 7 stents to LAD, circ, RCA (angiogram report pending). Now with IABP, temporary pacemaker after 3rd deg heart block in cath lab at [facility] on 3/26   # Small pericardial hemorrhage   # Remains unclear why we are having a hard time weaning him off of IABP in the setting of EF of 3-35%. Microvascular disease is a possibility.  - IABP in place  - try going down to  IABP support (2:1) when therapeutic on heparin (anti-Xa >0.3) and reassess response. Will perform echo when on lower support  - Hold off on isordil for now  - LVAD work up given SCr improving  - Cortisol normal  - Continue Milrinone 0.375  - Plan to keep CVP 9-12  - f/u blood cultures  - f/u ID recs  - Follow up heart endomyocardial bx results    # Neuro  - alert, follows commands  - WBC stable   - Seroquel 25 mg         Respiratory  #Intubated for airway protection due to mental status and emesis on 3/26, extubated 4/3  #Probable Aspiration PNA/pneumonitis vs MSSA pneumonia         # ID  - UA 4/15 with cloudy urine, 50 WBCs, many bacteria, no nitrates. UCx grew E coli (sens to CFTX - on tx)  - Fever on 4/20 and single spike 100.2 overnight with blood cultures in process, PICC and SGC removed, started on Vanc and pip/tazo  - ID consult placed; appreciate recs         # Endocrine  T2DM: HA1C 7.5 on admission.  - Insulin gtt per nurising protocol with hypoglycemia precautions.   - Switch to SQ Lantus today (25 U)      # GI  - Had several maroon BMs since 4/11, Hb overall dropped by a 1.5 grams or so, heparin inf held and GI consulted  - Colonoscopy 4/17 showed a rectal ulcer but no intervenable lesions  - Will continue to monitor Hb  - Protonix 40 PO BID         # Renal/  Renal function slightly improved- from hypoperfusion from cardiogenic shock 4/5 plus central venous congestion plus contrast nephropathy  -Diuresis  - Daily Cr  - Avoid nephrotoxins as able  - Plan to keep CVP 9-12      -Hypernatremia  - Resolved. Stopped his D5W, only getting FWF's.        FEN: feeding tube, and dysphagia level II  Code: FULL  PPx: PPI 40mg BID, senna PRN  Dispo: pending stability    LINES:  - SC IABP  4/19  - LUE PICC   4/20  - Chavis catheter  4/20        Plan of care discussed with Dr. Yue Farah MD  Cardiology Fellow  [phone]

## 2017-04-22 NOTE — PLAN OF CARE
Problem: Goal Outcome Summary  Goal: Goal Outcome Summary  Outcome: Improving  Patient continues with subclavian IABP therapy, IABP turned down to 1:2 at 1245, patient has tolerated well since. Heparin low intensity decreased at 1200, waiting on 1800 heparin Xa level results for further changes. Neuro intact, no changes. Afebrile. Pain in right shoulder IABP insertion site and with coccyx wound. Nasal cannula 3 liters. Lungs clear with coarse bases. Sinus tachycardia 110's-130's. MAP's 70-90's. 1600 GENE: CVP 15; SvO2 47; CO 3.6; CI 2.3; SVR 1610; SV 29.5. Given 2mg Bumex at 1800 for CVP. NJ tube in place, TF at 50, goal. Dysphagia level 2 diet with nectar thick liquids,tolerating well with family help and reminders to go slow. Switched from IV insulin to subcutaneous aspart and daily lantus, will continue to monitor for effectiveness. BM x3, soft, abdomen continues to be taut. UOP around 30-35mL/hr all day, good response to bumex. Up to chair with PT x2 assist tolerated transfer well, difficulty sitting d/t coccyx wound. Family and patient updated at bedside via  help. See flowsheets for further details.      Plan: Continue to monitor hemodynamics. Continue with IABP at 1:2 as tolerated and possibly wean to 1:3 in morning. Will update service with changes or concerns regarding patient condition.

## 2017-04-22 NOTE — PROGRESS NOTES
Calorie Counts    Intake recorded for: 4/21/17  Kcals: 142  Protein: 2g    # meals recorded: 50% cream potato soup, applesauce, pudding, 25% egg noodle    # Supplements recorded: 0

## 2017-04-23 ENCOUNTER — APPOINTMENT (OUTPATIENT)
Dept: OCCUPATIONAL THERAPY | Facility: CLINIC | Age: 52
DRG: 228 | End: 2017-04-23
Attending: INTERNAL MEDICINE
Payer: COMMERCIAL

## 2017-04-23 ENCOUNTER — APPOINTMENT (OUTPATIENT)
Dept: GENERAL RADIOLOGY | Facility: CLINIC | Age: 52
DRG: 228 | End: 2017-04-23
Attending: INTERNAL MEDICINE
Payer: COMMERCIAL

## 2017-04-23 ENCOUNTER — APPOINTMENT (OUTPATIENT)
Dept: ULTRASOUND IMAGING | Facility: CLINIC | Age: 52
DRG: 228 | End: 2017-04-23
Payer: COMMERCIAL

## 2017-04-23 ENCOUNTER — APPOINTMENT (OUTPATIENT)
Dept: PHYSICAL THERAPY | Facility: CLINIC | Age: 52
DRG: 228 | End: 2017-04-23
Attending: INTERNAL MEDICINE
Payer: COMMERCIAL

## 2017-04-23 ENCOUNTER — APPOINTMENT (OUTPATIENT)
Dept: SPEECH THERAPY | Facility: CLINIC | Age: 52
DRG: 228 | End: 2017-04-23
Attending: INTERNAL MEDICINE
Payer: COMMERCIAL

## 2017-04-23 LAB
ALBUMIN SERPL-MCNC: 2 G/DL (ref 3.4–5)
ALP SERPL-CCNC: 68 U/L (ref 40–150)
ALT SERPL W P-5'-P-CCNC: 49 U/L (ref 0–70)
ANION GAP SERPL CALCULATED.3IONS-SCNC: 5 MMOL/L (ref 3–14)
ANION GAP SERPL CALCULATED.3IONS-SCNC: 7 MMOL/L (ref 3–14)
AST SERPL W P-5'-P-CCNC: 35 U/L (ref 0–45)
BASE EXCESS BLDV CALC-SCNC: 10.1 MMOL/L
BASE EXCESS BLDV CALC-SCNC: 8.3 MMOL/L
BASE EXCESS BLDV CALC-SCNC: 8.3 MMOL/L
BASE EXCESS BLDV CALC-SCNC: 8.6 MMOL/L
BASE EXCESS BLDV CALC-SCNC: 8.7 MMOL/L
BASE EXCESS BLDV CALC-SCNC: 9.4 MMOL/L
BILIRUB DIRECT SERPL-MCNC: 0.1 MG/DL (ref 0–0.2)
BILIRUB SERPL-MCNC: 1 MG/DL (ref 0.2–1.3)
BUN SERPL-MCNC: 22 MG/DL (ref 7–30)
BUN SERPL-MCNC: 22 MG/DL (ref 7–30)
CA-I BLD-MCNC: 4.5 MG/DL (ref 4.4–5.2)
CALCIUM SERPL-MCNC: 7.8 MG/DL (ref 8.5–10.1)
CALCIUM SERPL-MCNC: 7.9 MG/DL (ref 8.5–10.1)
CHLORIDE SERPL-SCNC: 100 MMOL/L (ref 94–109)
CHLORIDE SERPL-SCNC: 101 MMOL/L (ref 94–109)
CO2 SERPL-SCNC: 32 MMOL/L (ref 20–32)
CO2 SERPL-SCNC: 33 MMOL/L (ref 20–32)
CREAT SERPL-MCNC: 1.18 MG/DL (ref 0.66–1.25)
CREAT SERPL-MCNC: 1.23 MG/DL (ref 0.66–1.25)
ERYTHROCYTE [DISTWIDTH] IN BLOOD BY AUTOMATED COUNT: 16.9 % (ref 10–15)
GFR SERPL CREATININE-BSD FRML MDRD: 62 ML/MIN/1.7M2
GFR SERPL CREATININE-BSD FRML MDRD: 65 ML/MIN/1.7M2
GLUCOSE BLDC GLUCOMTR-MCNC: 188 MG/DL (ref 70–99)
GLUCOSE BLDC GLUCOMTR-MCNC: 219 MG/DL (ref 70–99)
GLUCOSE BLDC GLUCOMTR-MCNC: 228 MG/DL (ref 70–99)
GLUCOSE BLDC GLUCOMTR-MCNC: 231 MG/DL (ref 70–99)
GLUCOSE SERPL-MCNC: 192 MG/DL (ref 70–99)
GLUCOSE SERPL-MCNC: 214 MG/DL (ref 70–99)
HCO3 BLDV-SCNC: 34 MMOL/L (ref 21–28)
HCO3 BLDV-SCNC: 35 MMOL/L (ref 21–28)
HCT VFR BLD AUTO: 25.6 % (ref 40–53)
HGB BLD-MCNC: 7.5 G/DL (ref 13.3–17.7)
LMWH PPP CHRO-ACNC: 0.19 IU/ML
LMWH PPP CHRO-ACNC: 0.36 IU/ML
MAGNESIUM SERPL-MCNC: 2.2 MG/DL (ref 1.6–2.3)
MAGNESIUM SERPL-MCNC: 2.2 MG/DL (ref 1.6–2.3)
MCH RBC QN AUTO: 29.4 PG (ref 26.5–33)
MCHC RBC AUTO-ENTMCNC: 29.3 G/DL (ref 31.5–36.5)
MCV RBC AUTO: 100 FL (ref 78–100)
O2/TOTAL GAS SETTING VFR VENT: ABNORMAL %
OXYHGB MFR BLDV: 44 %
OXYHGB MFR BLDV: 45 %
OXYHGB MFR BLDV: 48 %
OXYHGB MFR BLDV: 48 %
OXYHGB MFR BLDV: 50 %
OXYHGB MFR BLDV: 53 %
PCO2 BLDV: 50 MM HG (ref 40–50)
PCO2 BLDV: 52 MM HG (ref 40–50)
PCO2 BLDV: 53 MM HG (ref 40–50)
PCO2 BLDV: 54 MM HG (ref 40–50)
PCO2 BLDV: 56 MM HG (ref 40–50)
PCO2 BLDV: 56 MM HG (ref 40–50)
PH BLDV: 7.39 PH (ref 7.32–7.43)
PH BLDV: 7.39 PH (ref 7.32–7.43)
PH BLDV: 7.41 PH (ref 7.32–7.43)
PH BLDV: 7.42 PH (ref 7.32–7.43)
PH BLDV: 7.43 PH (ref 7.32–7.43)
PH BLDV: 7.44 PH (ref 7.32–7.43)
PLATELET # BLD AUTO: 268 10E9/L (ref 150–450)
PO2 BLDV: 28 MM HG (ref 25–47)
PO2 BLDV: 28 MM HG (ref 25–47)
PO2 BLDV: 30 MM HG (ref 25–47)
PO2 BLDV: 31 MM HG (ref 25–47)
POTASSIUM BLD-SCNC: 4.1 MMOL/L (ref 3.4–5.3)
POTASSIUM SERPL-SCNC: 3.8 MMOL/L (ref 3.4–5.3)
POTASSIUM SERPL-SCNC: 4 MMOL/L (ref 3.4–5.3)
PROT SERPL-MCNC: 6.9 G/DL (ref 6.8–8.8)
RADIOLOGIST FLAGS: ABNORMAL
RADIOLOGIST FLAGS: ABNORMAL
RBC # BLD AUTO: 2.55 10E12/L (ref 4.4–5.9)
SODIUM SERPL-SCNC: 139 MMOL/L (ref 133–144)
SODIUM SERPL-SCNC: 140 MMOL/L (ref 133–144)
VANCOMYCIN SERPL-MCNC: 16.7 MG/L
WBC # BLD AUTO: 7.4 10E9/L (ref 4–11)

## 2017-04-23 PROCEDURE — 97164 PT RE-EVAL EST PLAN CARE: CPT | Mod: GP

## 2017-04-23 PROCEDURE — 85520 HEPARIN ASSAY: CPT | Performed by: INTERNAL MEDICINE

## 2017-04-23 PROCEDURE — 25000132 ZZH RX MED GY IP 250 OP 250 PS 637: Performed by: INTERNAL MEDICINE

## 2017-04-23 PROCEDURE — S0171 BUMETANIDE 0.5 MG: HCPCS | Performed by: STUDENT IN AN ORGANIZED HEALTH CARE EDUCATION/TRAINING PROGRAM

## 2017-04-23 PROCEDURE — 25000132 ZZH RX MED GY IP 250 OP 250 PS 637

## 2017-04-23 PROCEDURE — 00000146 ZZHCL STATISTIC GLUCOSE BY METER IP

## 2017-04-23 PROCEDURE — 80076 HEPATIC FUNCTION PANEL: CPT | Performed by: INTERNAL MEDICINE

## 2017-04-23 PROCEDURE — 40000275 ZZH STATISTIC RCP TIME EA 10 MIN

## 2017-04-23 PROCEDURE — S0171 BUMETANIDE 0.5 MG: HCPCS

## 2017-04-23 PROCEDURE — 87040 BLOOD CULTURE FOR BACTERIA: CPT | Performed by: INTERNAL MEDICINE

## 2017-04-23 PROCEDURE — 92526 ORAL FUNCTION THERAPY: CPT | Mod: GN | Performed by: SPEECH-LANGUAGE PATHOLOGIST

## 2017-04-23 PROCEDURE — 25000128 H RX IP 250 OP 636

## 2017-04-23 PROCEDURE — 71010 XR CHEST PORT 1 VW: CPT

## 2017-04-23 PROCEDURE — 82330 ASSAY OF CALCIUM: CPT | Performed by: INTERNAL MEDICINE

## 2017-04-23 PROCEDURE — 25000132 ZZH RX MED GY IP 250 OP 250 PS 637: Performed by: STUDENT IN AN ORGANIZED HEALTH CARE EDUCATION/TRAINING PROGRAM

## 2017-04-23 PROCEDURE — 40000196 ZZH STATISTIC RAPCV CVP MONITORING

## 2017-04-23 PROCEDURE — 40000193 ZZH STATISTIC PT WARD VISIT

## 2017-04-23 PROCEDURE — 83735 ASSAY OF MAGNESIUM: CPT | Performed by: INTERNAL MEDICINE

## 2017-04-23 PROCEDURE — 40000014 ZZH STATISTIC ARTERIAL MONITORING DAILY

## 2017-04-23 PROCEDURE — 25000128 H RX IP 250 OP 636: Performed by: PEDIATRICS

## 2017-04-23 PROCEDURE — 80048 BASIC METABOLIC PNL TOTAL CA: CPT | Performed by: INTERNAL MEDICINE

## 2017-04-23 PROCEDURE — 97110 THERAPEUTIC EXERCISES: CPT | Mod: GO | Performed by: OCCUPATIONAL THERAPIST

## 2017-04-23 PROCEDURE — 40000225 ZZH STATISTIC SLP WARD VISIT: Performed by: SPEECH-LANGUAGE PATHOLOGIST

## 2017-04-23 PROCEDURE — 82805 BLOOD GASES W/O2 SATURATION: CPT | Performed by: INTERNAL MEDICINE

## 2017-04-23 PROCEDURE — 85027 COMPLETE CBC AUTOMATED: CPT | Performed by: INTERNAL MEDICINE

## 2017-04-23 PROCEDURE — 25000125 ZZHC RX 250: Performed by: INTERNAL MEDICINE

## 2017-04-23 PROCEDURE — 36415 COLL VENOUS BLD VENIPUNCTURE: CPT | Performed by: INTERNAL MEDICINE

## 2017-04-23 PROCEDURE — 80202 ASSAY OF VANCOMYCIN: CPT | Performed by: INTERNAL MEDICINE

## 2017-04-23 PROCEDURE — 27210437 ZZH NUTRITION PRODUCT SEMIELEM INTERMED LITER

## 2017-04-23 PROCEDURE — 40000076 ZZH STATISTIC IABP MONITORING

## 2017-04-23 PROCEDURE — 99291 CRITICAL CARE FIRST HOUR: CPT | Mod: GC | Performed by: INTERNAL MEDICINE

## 2017-04-23 PROCEDURE — 25000125 ZZHC RX 250

## 2017-04-23 PROCEDURE — 97530 THERAPEUTIC ACTIVITIES: CPT | Mod: GP

## 2017-04-23 PROCEDURE — 97110 THERAPEUTIC EXERCISES: CPT | Mod: GP

## 2017-04-23 PROCEDURE — 20000004 ZZH R&B ICU UMMC

## 2017-04-23 PROCEDURE — 40000133 ZZH STATISTIC OT WARD VISIT: Performed by: OCCUPATIONAL THERAPIST

## 2017-04-23 PROCEDURE — 84132 ASSAY OF SERUM POTASSIUM: CPT | Performed by: INTERNAL MEDICINE

## 2017-04-23 PROCEDURE — 25000128 H RX IP 250 OP 636: Performed by: STUDENT IN AN ORGANIZED HEALTH CARE EDUCATION/TRAINING PROGRAM

## 2017-04-23 PROCEDURE — 93970 EXTREMITY STUDY: CPT

## 2017-04-23 PROCEDURE — 93970 EXTREMITY STUDY: CPT | Mod: XS

## 2017-04-23 PROCEDURE — 25000128 H RX IP 250 OP 636: Performed by: INTERNAL MEDICINE

## 2017-04-23 PROCEDURE — 25000125 ZZHC RX 250: Performed by: STUDENT IN AN ORGANIZED HEALTH CARE EDUCATION/TRAINING PROGRAM

## 2017-04-23 RX ORDER — BUMETANIDE 0.25 MG/ML
2 INJECTION INTRAMUSCULAR; INTRAVENOUS ONCE
Status: COMPLETED | OUTPATIENT
Start: 2017-04-23 | End: 2017-04-23

## 2017-04-23 RX ORDER — NICOTINE POLACRILEX 4 MG
15-30 LOZENGE BUCCAL
Status: DISCONTINUED | OUTPATIENT
Start: 2017-04-23 | End: 2017-05-06

## 2017-04-23 RX ORDER — DEXTROSE MONOHYDRATE 25 G/50ML
25-50 INJECTION, SOLUTION INTRAVENOUS
Status: DISCONTINUED | OUTPATIENT
Start: 2017-04-23 | End: 2017-05-06

## 2017-04-23 RX ORDER — HYDRALAZINE HYDROCHLORIDE 25 MG/1
25 TABLET, FILM COATED ORAL EVERY 8 HOURS SCHEDULED
Status: DISCONTINUED | OUTPATIENT
Start: 2017-04-23 | End: 2017-04-24

## 2017-04-23 RX ADMIN — PANTOPRAZOLE SODIUM 40 MG: 40 TABLET, DELAYED RELEASE ORAL at 19:23

## 2017-04-23 RX ADMIN — ATORVASTATIN CALCIUM 40 MG: 40 TABLET, FILM COATED ORAL at 19:23

## 2017-04-23 RX ADMIN — MULTIVIT AND MINERALS-FERROUS GLUCONATE 9 MG IRON/15 ML ORAL LIQUID 15 ML: at 08:14

## 2017-04-23 RX ADMIN — ACETAMINOPHEN 650 MG: 325 TABLET, FILM COATED ORAL at 08:14

## 2017-04-23 RX ADMIN — POTASSIUM CHLORIDE 20 MEQ: 1.5 POWDER, FOR SOLUTION ORAL at 06:27

## 2017-04-23 RX ADMIN — ASPIRIN 81 MG CHEWABLE TABLET 324 MG: 81 TABLET CHEWABLE at 08:14

## 2017-04-23 RX ADMIN — ACETAMINOPHEN 650 MG: 325 TABLET, FILM COATED ORAL at 14:52

## 2017-04-23 RX ADMIN — OXYCODONE HYDROCHLORIDE 5 MG: 5 TABLET ORAL at 10:34

## 2017-04-23 RX ADMIN — PANTOPRAZOLE SODIUM 40 MG: 40 TABLET, DELAYED RELEASE ORAL at 08:14

## 2017-04-23 RX ADMIN — PIPERACILLIN AND TAZOBACTAM 4.5 G: 4; .5 INJECTION, POWDER, FOR SOLUTION INTRAVENOUS at 10:04

## 2017-04-23 RX ADMIN — Medication 7 ML: at 08:14

## 2017-04-23 RX ADMIN — VANCOMYCIN HYDROCHLORIDE 1250 MG: 10 INJECTION, POWDER, LYOPHILIZED, FOR SOLUTION INTRAVENOUS at 08:10

## 2017-04-23 RX ADMIN — Medication 220 MG: at 08:14

## 2017-04-23 RX ADMIN — Medication 12.5 MG: at 21:16

## 2017-04-23 RX ADMIN — OXYCODONE HYDROCHLORIDE 5 MG: 5 TABLET ORAL at 01:05

## 2017-04-23 RX ADMIN — PIPERACILLIN AND TAZOBACTAM 4.5 G: 4; .5 INJECTION, POWDER, FOR SOLUTION INTRAVENOUS at 16:45

## 2017-04-23 RX ADMIN — PIPERACILLIN AND TAZOBACTAM 4.5 G: 4; .5 INJECTION, POWDER, FOR SOLUTION INTRAVENOUS at 03:58

## 2017-04-23 RX ADMIN — HYDRALAZINE HYDROCHLORIDE 25 MG: 25 TABLET ORAL at 21:18

## 2017-04-23 RX ADMIN — BUMETANIDE 2 MG: 0.25 INJECTION INTRAMUSCULAR; INTRAVENOUS at 14:52

## 2017-04-23 RX ADMIN — Medication 7 ML: at 19:23

## 2017-04-23 RX ADMIN — PIPERACILLIN AND TAZOBACTAM 4.5 G: 4; .5 INJECTION, POWDER, FOR SOLUTION INTRAVENOUS at 21:18

## 2017-04-23 RX ADMIN — HEPARIN SODIUM 750 UNITS/HR: 10000 INJECTION, SOLUTION INTRAVENOUS at 01:13

## 2017-04-23 RX ADMIN — OXYCODONE HYDROCHLORIDE 5 MG: 5 TABLET ORAL at 14:52

## 2017-04-23 RX ADMIN — BUMETANIDE 2 MG: 0.25 INJECTION INTRAMUSCULAR; INTRAVENOUS at 02:00

## 2017-04-23 RX ADMIN — BUMETANIDE 2 MG: 0.25 INJECTION INTRAMUSCULAR; INTRAVENOUS at 21:23

## 2017-04-23 RX ADMIN — MILRINONE LACTATE 0.38 MCG/KG/MIN: 200 INJECTION, SOLUTION INTRAVENOUS at 13:18

## 2017-04-23 RX ADMIN — HYDRALAZINE HYDROCHLORIDE 25 MG: 25 TABLET ORAL at 16:45

## 2017-04-23 RX ADMIN — POTASSIUM CHLORIDE 20 MEQ: 1.5 POWDER, FOR SOLUTION ORAL at 18:19

## 2017-04-23 RX ADMIN — INSULIN GLARGINE 25 UNITS: 100 INJECTION, SOLUTION SUBCUTANEOUS at 08:14

## 2017-04-23 ASSESSMENT — PAIN DESCRIPTION - DESCRIPTORS
DESCRIPTORS: SORE

## 2017-04-23 NOTE — PLAN OF CARE
Problem: Goal Outcome Summary  Goal: Goal Outcome Summary  4E - Re-evaluation completed and treatment initiated. Pt requires min-mod A x 2 for transfers, including bed > chair. Noted weakness and increased knee flexion with poor ability to step/lift feet during transfers, Unsteady requiring assist to prevent LOB. Pt limited by decreased strength, AMS, balance, activity tolerance, and fatigue. Currently recommend rehab when medically appropriate. Recommend pt up in chair at least 2x/day and use of FWW for transfers.

## 2017-04-23 NOTE — PLAN OF CARE
Problem: Goal Outcome Summary  Goal: Goal Outcome Summary  OT: 4E: Pt refusing OOB at this time 2/2 fatigue, OT educated pt on BUE therex to perform supine, VSS, Pt stating agreement to transfer to chair w/ PT this PM.  Rec: rehab to increase ind and functional endurance in ADLS/IADLS

## 2017-04-23 NOTE — PROGRESS NOTES
Calorie Counts    Intake recorded for: 4/22/17   Kcals: 338  Proteins: 9    # Meals recorded for: 100% banana, nectar apple juice, 50% potato soup, egg noodle, 33% mushroom spinach omelet    # Supplements recorded for: 0

## 2017-04-23 NOTE — PHARMACY-VANCOMYCIN DOSING SERVICE
Pharmacy Vancomycin Note  Date of Service 2017  Patient's  1965   51 year old, male    Indication: Sepsis  Goal Trough Level: 15-20 mg/L  Day of Therapy: 5  Current Vancomycin regimen:  1250 mg IV q18h    Current estimated CrCl = Estimated Creatinine Clearance: 60.3 mL/min (based on Cr of 1.23).    Creatinine for last 3 days  2017:  4:39 PM Creatinine 0.99 mg/dL  2017:  4:19 AM Creatinine 1.17 mg/dL;  4:07 PM Creatinine 1.18 mg/dL; 10:11 PM Creatinine 1.23 mg/dL  2017:  3:50 AM Creatinine 1.24 mg/dL;  4:41 PM Creatinine 1.14 mg/dL  2017:  5:25 AM Creatinine 1.23 mg/dL    Recent Vancomycin Levels (past 3 days)  2017: 11:18 AM Vancomycin Level 20.6 mg/L  2017:  5:25 AM Vancomycin Level 16.7 mg/L    Vancomycin IV Administrations (past 72 hours)                   vancomycin (VANCOCIN) 1,250 mg in NaCl 0.9 % 250 mL intermittent infusion (mg) 1,250 mg New Bag 17 1212     1,250 mg New Bag 17 1822    vancomycin (VANCOCIN) 1,250 mg in NaCl 0.9 % 250 mL intermittent infusion (mg) 1,250 mg New Bag 17 2333     1,250 mg New Bag  1206                Nephrotoxins and other renal medications (Future)    Start     Dose/Rate Route Frequency Ordered Stop    17 1800  vancomycin (VANCOCIN) 1,250 mg in NaCl 0.9 % 250 mL intermittent infusion      1,250 mg Intravenous EVERY 18 HOURS 17 1510      17 2200  piperacillin-tazobactam (ZOSYN) 4.5 g vial to attach to  mL bag      4.5 g  over 1 Hours Intravenous EVERY 6 HOURS 17 2149               Contrast Orders - past 72 hours     None          Interpretation of levels and current regimen:  Trough level is  Therapeutic    Has serum creatinine changed > 50% in last 72 hours: No    Urine output:  good urine output    Plan:  1.  Continue Current Dose  2.  Pharmacy will check trough levels as appropriate in 1-3 Days.    3. Serum creatinine levels will be ordered daily for the first week of therapy and at  least twice weekly for subsequent weeks.      Cristal Colon, PharmD  Pager [phone]        .

## 2017-04-23 NOTE — PLAN OF CARE
Problem: Goal Outcome Summary  Goal: Goal Outcome Summary  SLP: Pt seen bedside for dysphagia f/u at RN request.  Increased fatigue noted but pt able to maintain JAYLENE for duration of brief session.  Recommend continue dysphagia diet level 2 and nectar thick liquids when fully alert and upright. Pt will need to pace self, take small bites/sips, and alternate consistencies.  ST to follow.

## 2017-04-23 NOTE — PROGRESS NOTES
"[facility]  Cardiology II Service / Advanced Heart Failure        Interval History:   - Remained stable on 1:2 IABP (on therapeutic heparin inf)  - Non occlusive thrombus in Lt IJ, chronic appearing non occlusive thrombus in the Lt CFV   - Tmax 100.8      Intake/Output Summary (Last 24 hours) at 04/23/17 1416  Last data filed at 04/23/17 1400   Gross per 24 hour   Intake             3266 ml   Output             3125 ml   Net              141 ml         Pertinent Medications:  I have reviewed this patient's current medications      insulin aspart  1-12 Units Subcutaneous Q4H     bumetanide  2 mg Intravenous Once     insulin glargine  25 Units Subcutaneous QAM AC     vancomycin (VANCOCIN) IV  1,250 mg Intravenous Q18H     aspirin  324 mg Oral or Feeding Tube Daily     heparin lock flush  5-10 mL Intracatheter Q24H     QUEtiapine  12.5 mg Oral At Bedtime     piperacillin-tazobactam  4.5 g Intravenous Q6H     pantoprazole  40 mg Per Feeding Tube BID     zinc sulfate (20 mg [place]. Zn/mL)  220 mg Per Feeding Tube Daily     vitamin A-D & C drops  7 mL Per Feeding Tube BID     atorvastatin  40 mg Oral or Feeding Tube Daily at 8 pm     pneumococcal vaccine  0.5 mL Intramuscular Once     sodium chloride (PF)  3 mL Intracatheter Q8H     multivitamins with minerals  15 mL Per Feeding Tube Daily         Examination:  /74 (BP Location: Left arm)  Temp 98.3  F (36.8  C) (Oral)  Resp 22  Ht 1.575 m (5' 2.01\")  Wt 58.7 kg (129 lb 6.6 oz)  SpO2 97%  BMI 23.66 kg/m2  GEN: A, cooperative and conversational   NECK: can not assess JVP   RESP: crackles   CV: S1S2S3, holossystolic murmur at the apex  ABD: Soft, nontender to palpation, no HSM, +BS, no guarding  EXT: No peripheral edema, warm/well perfused   NEURO: CN II-XII intact, normal 5/5 strength in all extremitites  SKIN: Normal skin turgor, no rash on limited exam    Data:  CMP  Recent Labs  Lab 04/23/17  0804 04/23/17  0525 " 04/22/17  1641 04/22/17  1157  04/22/17  0350 04/21/17  2211 04/21/17  0419  04/20/17 2048 04/20/17  0345   NA  --  140 140  --   --  142 142  < > 143  --   --   < > 145*   POTASSIUM 4.1 4.0 4.0 4.2  < > 4.0 4.0  < > 3.6  < > 3.5  < > 3.8   CHLORIDE  --  100 103  --   --  104 104  < > 105  --   --   < > 110*   CO2  --  32 30  --   --  31 32  < > 33*  --   --   < > 27   ANIONGAP  --  7 7  --   --  8 7  < > 6  --   --   < > 8   GLC  --  214* 243*  --   --  104* 122*  < > 66*  --   --   < > 128*   BUN  --  22 22  --   --  22 21  < > 16  --   --   < > 22   CR  --  1.23 1.14  --   --  1.24 1.23  < > 1.17  --   --   < > 1.04   GFRESTIMATED  --  62 68  --   --  62 62  < > 66  --   --   < > 76   GFRESTBLACK  --  75 82  --   --  75 75  < > 80  --   --   < > >90African American GFR Calc   ROB  --  7.8* 7.6*  --   --  7.8* 7.3*  < > 7.6*  --   --   < > 7.7*   MAG  --  2.2 2.1  --   --  2.2 2.2  < > 2.4*  < > 2.0  < > 2.4*   PHOS  --   --  3.1  --   --  3.2  --   --  3.4  --  2.0*  < > 2.8   PROTTOTAL  --  6.9  --   --   --  6.5*  --   --  7.0  --   --   --  6.4*   ALBUMIN  --  2.0*  --   --   --  1.9*  --   --  2.3*  --   --   --  2.1*   BILITOTAL  --  1.0  --   --   --  0.5  --   --  0.5  --   --   --  0.4   ALKPHOS  --  68  --   --   --  63  --   --  63  --   --   --  57   AST  --  35  --   --   --  49*  --   --  21  --   --   --  27   ALT  --  49  --   --   --  49  --   --  38  --   --   --  43   < > = values in this interval not displayed.  CBC    Recent Labs  Lab 04/23/17  0525 04/22/17  1813 04/22/17  0350 04/21/17  1828   WBC 7.4 7.9 8.8 10.7   RBC 2.55* 2.63* 2.57* 2.66*   HGB 7.5* 7.8* 7.9* 8.1*   HCT 25.6* 26.1* 25.4* 26.0*    99 99 98   MCH 29.4 29.7 30.7 30.5   MCHC 29.3* 29.9* 31.1* 31.2*   RDW 16.9* 17.0* 17.4* 17.3*    237 206 189     INR    Recent Labs  Lab 04/20/17  0345 04/19/17  1159   INR 1.25* 1.23*     Arterial Blood Gas    Recent Labs  Lab 04/23/17  1204 04/23/17  0804 04/23/17  0349  04/23/17  0003  04/22/17  1157  04/21/17  0838  04/19/17  1603 04/19/17  1421   PH  --   --   --   --   --  7.46*  --  7.51*  --  7.49* 7.52*   PCO2  --   --   --   --   --  45  --  39  --  38 34*   PO2  --   --   --   --   --  112*  --  111*  --  125* 135*   HCO3  --   --   --   --   --  32*  --  31*  --  29* 28   O2PER 3L 3LNC 3NC 3L  < > 3L  3L  < > 3L  3L  < > 3l 40   < > = values in this interval not displayed.    Imaging Studies:  Echo 3/27  The visual ejection fraction is estimated at 30-35%.  There is extensive inferior, inferoseptal, and inferolateral wall akinesis.  Basal/mid lateral wall hypokinesis  There is moderate to severe septal hypokinesis.  Septal wall motion abnormality may reflect pacemaker activation.  There is a catheter/pacemaker lead seen in the right ventricle.  The right ventricle is mildly dilated.  Severely decreased right ventricular systolic function  There is no pericardial effusion.  The rhythm was paced.  Compared to the prior study, the LVEF appears somewhat decreased. Septal wall  motion abnormality larger- may reflect, in part, that patient in sinus tach on  prior study, and ventricular paced now. No hemodynamically significant  valvular abnormalities on 2D or color flow imaging     CT chest/abdomen 3/27   IMPRESSION:   1. Small mediastinal hemorrhage, small bilateral pleural effusions  which may be hemorrhagic. Small intraperitoneal hemorrhage. Minimal  pericardial hemorrhage.  2. Minimal pneumoperitoneum in the left paracolic gutter, of unknown  significance. No pneumatosis as clinically questioned.  3. IABP slightly low in position.  4. Bilateral pulmonary dependent consolidations represent compression  atelectasis, however differentials include aspiration.     Cath 3/26 [facility]  SUMMARY:   --Inferior STEMI w/ late presentation. Culprit dictated by complete  heart block, and cardiogenic shock. Emergent echo in the Cath Lab also  shows significant RV  dysfunction.  --Three vessel coronary artery disease with diffuse coronary disease  out to the distal vessels  --Successful POBA of the prox and mid-RCA. Stent was not placed, in  anticipation he may need to be considered for bypass surgery in the  near future  --Insertion of a temporary pacemaker for bradycardia (AVB) and  hypotension  --Insertion of a IABP  --Upper GI bleed consistent with [place]     Cath [place] 3/27     CT head 3/28: normal      Echo 3/29  Interpretation Summary  Limited study. Ischemic CM. Moderately (EF 30-35%) reduced left ventricular  function is present. Traced at 32%.  Posterior wall akinesis is present. Lateral wall akinesis is present. Inferior  wall akinesis is present.  Right ventricular function, chamber size, wall motion, and thickness are  normal.  Dilation of the inferior vena cava is present with abnormal respiratory  variation in diameter.     Compared to prior study, RV fxn is improved.     Echo 3/31  Left Ventricle  The Ejection Fraction is estimated at 50-55%. Inferior,posterior,lateral,basal  septal wall akinesis as reported before. No change.     CORONARY ANGIOGRAM 4/1:   1. Both coronary arteries arise from their respective cusps.  2. Right dominant.  3. LM has mild luminal irregularities.   4. LAD supplies the apex along with the RCA (type 2 LAD) and gives rise to septal perforators and a large and branching D1. There are widely patent stents extending from the proximal to mid LAD. The dLAD has mild to moderate disease to 30% stenosis. The D1 is jailed by the LAD stents, but has EBER 3 flow with disease to 30% stenosis.   5. The small ramus is no longer present.  6. LCX is occluded at the ostium.   7. RCA gives rise to PL branches and supplies PDA. There are widely patent stents extending from the proximal to mid RCA. The remainder of the mRCA has disease to 50% stenosis and the dRCA has disease to 10% stenosis. The RPDA has a widely patent stent and the  remainder of the artery has mild disease to 10% stenosis. The RPLA has small vessels with diffuse disease including a distal branch occlusion. The RPLA supplies some small collateral branches to the dLCx.      COMPLICATIONS:  1. None      SUMMARY:   1. Cardiogenic shock following an inferior STEMI.  2. The LCx re-occlusion is not surprising as the recently revascularized LCx  had poor outflow and the revascularized portion did not supply a large territory (limited to no flow was present distal to the stented segment immediately following stenting). Repeat revascularization of the LCx would result in the same outcome of repeat closure and also risk embolizing thrombus from the LCx into the LAD so no treatment of the occluded LCx was performed.   3. Three vessel coronary artery disease with patent LAD and RCA stents and an occluded LCx.     Echo 4/3  Left ventricular size is normal.  The Ejection Fraction is estimated at 35-40%.  Inferior wall akinesis is present.  Lateral wall akinesis is present.  Posterior wall akinesis is present.  Right ventricular function, chamber size, wall motion, and thickness are  normal.  Moderate mitral insufficiency is present.  Moderate tricuspid insufficiency is present.  Right ventricular systolic pressure is 47mmHg above the right atrial pressure.  The inferior vena cava is normal.     Echo 4/5  Moderately (EF 35-40%) reduced left ventricular function is present. Traced at  40%.  Moderate mitral insufficiency is present.  The cause of the mitral insufficiency appears to be restricted posterior  leaflet from posterior MI. No mitral leaflet pathology seen.  Dilation of the inferior vena cava is present with abnormal respiratory  variation in diameter.     CT abd without contrast 4/4  IMPRESSION:   1. No evidence of ischemic bowel, obstruction, or intra-abdominal  infection.  2. Bibasilar patchy pulmonary opacities likely represent combination  of aspiration and atelectasis.  3.  Small amount of simple free fluid within the lower abdomen.  4. Small left retroperitoneal hematoma along the iliac vasculature  likely postprocedural.   5. Unchanged size of bilateral pleural effusions, which now consist of  simple fluid.    Assessment and Plan  51 year old man presented with cardiogenic shock from inferior STEMI s/p RCA aspiration thrombectomy and POBA on 3/26 at [facility] s/p IABP and initiation of Dopamine, also during procedure, CHB s/p temp pacer, emesis/hematmesis and altered mental status s/p intubation transferred here for consideration of mechanical support on 3/27. Had PCI to RCA, LAD and LCx (on ASA and plavix) started on epinephrine in addition to dopamine, post procedure developed distributive shock picture from infection (aspiration pneumonia? Sputum cx growing staph aureus, on vanco and zosyn) vs post-MI SIRS response. Currently in cardiogenic shock with IABP in situ    Card  # Cardiogenic shock 4/3- from underlying 3v CAD-has had high SVR and low CI, complicated by ischemic MR  # Due to inferior wall STEMI. S/P 7 stents to LAD, circ, RCA (angiogram report pending). Now with IABP, temporary pacemaker after 3rd deg heart block in cath lab at [facility] on 3/26   # Small pericardial hemorrhage   # Remains unclear why we are having a hard time weaning him off of IABP in the setting of EF of 3-35%. Microvascular disease is a possibility.  - IABP in place  - Hold off on isordil for now  - Cortisol normal  - Continue Milrinone 0.375  - Plan to keep CVP 9-12  - f/u blood cultures  - Follow up heart endomyocardial bx results  - Turn IABP down to 1:3 and monitor hemodynamics; will consider echo tomorrow on 1:3 to assess his MR    # Neuro  - alert, follows commands  - WBC stable   - Seroquel 25 mg         Respiratory  #Intubated for airway protection due to mental status and emesis on 3/26, extubated 4/3  #Probable Aspiration PNA/pneumonitis vs MSSA pneumonia         # ID  - UA 4/15 with cloudy  urine, 50 WBCs, many bacteria, no nitrates. UCx grew E coli (sens to CFTX - on tx)  - Fever on 4/20 and single spike 100.2 overnight with blood cultures in process, PICC and SGC removed, started on Vanc and pip/tazo  - ID consult placed; appreciate recs         # Endocrine  T2DM: HA1C 7.5 on admission.  - Insulin gtt per nurising protocol with hypoglycemia precautions.   - Switch to SQ Lantus 4/22      # GI  - Had several maroon BMs since 4/11, Hb overall dropped by a 1.5 grams or so, heparin inf was held and GI consulted  - Colonoscopy 4/17 showed a rectal ulcer but no intervenable lesions  - Will continue to monitor Hb, back on heparin inf  - Protonix 40 PO BID         # Renal/  - Daily Cr  - Avoid nephrotoxins as able  - Plan to keep CVP 9-12      -Hypernatremia  - Resolved. Stopped his D5W, only getting FWF's.        FEN: feeding tube, and dysphagia level II  Code: FULL  PPx: PPI 40mg BID, senna PRN  Dispo: pending stability    LINES:  - SC IABP  4/19  - LUE PICC   4/20  - Chavis catheter  4/20        Plan of care discussed with Dr. Yue Loving MD  Cardiovascular Disease Fellow  Pager: [phone]

## 2017-04-23 NOTE — PROGRESS NOTES
04/23/17 1315   Quick Adds   Type of Visit PT Re-evaluation   Functional Level Prior   Prior Functional Level Comment Please see initial evaluation for PLOF and living situation information   General Information   Onset of Illness/Injury or Date of Surgery - Date 03/27/17   Referring Physician Simona Gonzalez MD   Patient/Family Goals Statement None specifically stated by pt when asked   Pertinent History of Current Problem (include personal factors and/or comorbidities that impact the POC) 51 year old man presented with cardiogenic shock from inferior STEMI s/p RCA aspiration thrombectomy and POBA on 3/26 at [facility] s/p IABP and initiation of Dopamine, also during procedure, CHB s/p temp pacer, emesis/hematmesis and altered mental status s/p intubation transferred here for consideration of mechanical support on 3/27. Had PCI to RCA, LAD and LCx (on ASA and plavix) started on epinephrine in addition to dopamine, post procedure developed distributive shock picture from infection (aspiration pneumonia? Sputum cx growing staph aureus, on vanco and zosyn) vs post-MI SIRS response. Currently in cardiogenic shock with IABP in situ; pt confused with good family support   Precautions/Limitations fall precautions  (subclavian IABP)   Cognitive Status Examination   Orientation person;place   Level of Consciousness alert;confused   Follows Commands and Answers Questions 75% of the time;able to follow single-step instructions   Personal Safety and Judgment impulsive;at risk behaviors demonstrated   Cognitive Comment Pt not oriented to date of birth or current date when asked   Pain Assessment   Patient Currently in Pain No   Posture    Posture Forward head position;Protracted shoulders   Range of Motion (ROM)   ROM Comment BLE and BUE appear grossly WFL   Strength   Strength Comments Not formally assessed but demonstrates at least 3/5 on RLE, <3/5 on LLE with AROM   Bed Mobility   Bed Mobility Comments Mod A  "supine > Sit    Transfer Skills   Transfer Comments Min-mod A  x2 sit <> stand   Gait   Gait Comments Bed > chair with min A x 2, unsteady with poor weight shifting and increased knee flexion   Balance   Balance Comments Sitting balance at EOB with CGA, standing balance with min A x 2 and cues for knee/hip extension   General Therapy Interventions   Planned Therapy Interventions balance training;bed mobility training;gait training;ROM;strengthening;stretching;transfer training;risk factor education;home program guidelines;progressive activity/exercise   Clinical Impression   Criteria for Skilled Therapeutic Intervention yes, treatment indicated   PT Diagnosis impaired functional mobility   Influenced by the following impairments AMS/confusion, activity tolerance, strength, balance   Functional limitations due to impairments gait, stairs, transfers, bed mobility, functional endurance   Clinical Presentation Evolving/Changing   Clinical Presentation Rationale Pt weaning down from IABP, limited by significant fatigue, decreased activity tolerance and decreased strength, requires continuous monintoring of VSS at this time, continues on supplemental O2   Clinical Decision Making (Complexity) Moderate complexity   Therapy Frequency` other (see comments)  (6x/wk)   Predicted Duration of Therapy Intervention (days/wks) 4 weeks   Anticipated Discharge Disposition Transitional Care Facility;Acute Rehabilitation Facility   Risk & Benefits of therapy have been explained Yes   Patient, Family & other staff in agreement with plan of care Yes   [facility] AM-PAC  \"6 Clicks\" V.2 Basic Mobility Inpatient Short Form   1. Turning from your back to your side while in a flat bed without using bedrails? 3 - A Little   2. Moving from lying on your back to sitting on the side of a flat bed without using bedrails? 2 - A Lot   3. Moving to and from a bed to a chair (including a wheelchair)? 2 - A Lot   4. Standing up from a chair " using your arms (e.g., wheelchair, or bedside chair)? 2 - A Lot   5. To walk in hospital room? 2 - A Lot   6. Climbing 3-5 steps with a railing? 1 - Total   Basic Mobility Raw Score (Score out of 24.Lower scores equate to lower levels of function) 12   Total Evaluation Time   Total Evaluation Time (Minutes) 7

## 2017-04-23 NOTE — PLAN OF CARE
Problem: Goal Outcome Summary  Goal: Goal Outcome Summary  Outcome: Improving  Progress Note:  Pt intermittently alert and oriented x4, but occassionally confused regarding time.  IABP remains at 1:2 with EKG trigger and 100% augmentation.  MAP 70-80's.  HR sinus tach with rate 120-130's.  Tmax 100.8, Tylenol given and BC drawn.  UP began to decline so 2 mg Bumex given with appropriate response.  CVP 13/12/10 with CI 2.3/2.3/2.6.  Heparin decreased to 600 units/hr, next anti-xa at 1200.  Milrinone continues at 0.375 mcg/kg/min fixed rate.  Pt and family updated.  Progressing towards goals, continue with plan of care.

## 2017-04-24 ENCOUNTER — APPOINTMENT (OUTPATIENT)
Dept: CARDIOLOGY | Facility: CLINIC | Age: 52
DRG: 228 | End: 2017-04-24
Payer: COMMERCIAL

## 2017-04-24 ENCOUNTER — APPOINTMENT (OUTPATIENT)
Dept: GENERAL RADIOLOGY | Facility: CLINIC | Age: 52
DRG: 228 | End: 2017-04-24
Attending: INTERNAL MEDICINE
Payer: COMMERCIAL

## 2017-04-24 ENCOUNTER — APPOINTMENT (OUTPATIENT)
Dept: PHYSICAL THERAPY | Facility: CLINIC | Age: 52
DRG: 228 | End: 2017-04-24
Attending: INTERNAL MEDICINE
Payer: COMMERCIAL

## 2017-04-24 LAB
ABO + RH BLD: NORMAL
ABO + RH BLD: NORMAL
ALBUMIN SERPL-MCNC: 1.9 G/DL (ref 3.4–5)
ALP SERPL-CCNC: 62 U/L (ref 40–150)
ALT SERPL W P-5'-P-CCNC: 38 U/L (ref 0–70)
ANION GAP SERPL CALCULATED.3IONS-SCNC: 6 MMOL/L (ref 3–14)
ANION GAP SERPL CALCULATED.3IONS-SCNC: 9 MMOL/L (ref 3–14)
AST SERPL W P-5'-P-CCNC: 24 U/L (ref 0–45)
BASE EXCESS BLDV CALC-SCNC: 10.1 MMOL/L
BASE EXCESS BLDV CALC-SCNC: 10.7 MMOL/L
BASE EXCESS BLDV CALC-SCNC: 6.3 MMOL/L
BASE EXCESS BLDV CALC-SCNC: 6.8 MMOL/L
BASE EXCESS BLDV CALC-SCNC: 7.7 MMOL/L
BASE EXCESS BLDV CALC-SCNC: 9.6 MMOL/L
BILIRUB DIRECT SERPL-MCNC: 0.1 MG/DL (ref 0–0.2)
BILIRUB SERPL-MCNC: 0.6 MG/DL (ref 0.2–1.3)
BLD GP AB SCN SERPL QL: NORMAL
BLOOD BANK CMNT PATIENT-IMP: NORMAL
BUN SERPL-MCNC: 20 MG/DL (ref 7–30)
BUN SERPL-MCNC: 21 MG/DL (ref 7–30)
CA-I BLD-MCNC: 4.3 MG/DL (ref 4.4–5.2)
CA-I BLD-MCNC: 4.5 MG/DL (ref 4.4–5.2)
CALCIUM SERPL-MCNC: 7.8 MG/DL (ref 8.5–10.1)
CALCIUM SERPL-MCNC: 8 MG/DL (ref 8.5–10.1)
CHLORIDE SERPL-SCNC: 102 MMOL/L (ref 94–109)
CHLORIDE SERPL-SCNC: 102 MMOL/L (ref 94–109)
CO2 SERPL-SCNC: 30 MMOL/L (ref 20–32)
CO2 SERPL-SCNC: 31 MMOL/L (ref 20–32)
COPATH REPORT: NORMAL
CREAT SERPL-MCNC: 1.12 MG/DL (ref 0.66–1.25)
CREAT SERPL-MCNC: 1.14 MG/DL (ref 0.66–1.25)
ERYTHROCYTE [DISTWIDTH] IN BLOOD BY AUTOMATED COUNT: 17 % (ref 10–15)
GFR SERPL CREATININE-BSD FRML MDRD: 68 ML/MIN/1.7M2
GFR SERPL CREATININE-BSD FRML MDRD: 69 ML/MIN/1.7M2
GLUCOSE BLDC GLUCOMTR-MCNC: 149 MG/DL (ref 70–99)
GLUCOSE BLDC GLUCOMTR-MCNC: 189 MG/DL (ref 70–99)
GLUCOSE BLDC GLUCOMTR-MCNC: 189 MG/DL (ref 70–99)
GLUCOSE BLDC GLUCOMTR-MCNC: 196 MG/DL (ref 70–99)
GLUCOSE BLDC GLUCOMTR-MCNC: 197 MG/DL (ref 70–99)
GLUCOSE BLDC GLUCOMTR-MCNC: 237 MG/DL (ref 70–99)
GLUCOSE BLDC GLUCOMTR-MCNC: 239 MG/DL (ref 70–99)
GLUCOSE SERPL-MCNC: 197 MG/DL (ref 70–99)
GLUCOSE SERPL-MCNC: 248 MG/DL (ref 70–99)
HCO3 BLDV-SCNC: 31 MMOL/L (ref 21–28)
HCO3 BLDV-SCNC: 32 MMOL/L (ref 21–28)
HCO3 BLDV-SCNC: 32 MMOL/L (ref 21–28)
HCO3 BLDV-SCNC: 35 MMOL/L (ref 21–28)
HCO3 BLDV-SCNC: 35 MMOL/L (ref 21–28)
HCO3 BLDV-SCNC: 36 MMOL/L (ref 21–28)
HCT VFR BLD AUTO: 25.7 % (ref 40–53)
HGB BLD-MCNC: 7.6 G/DL (ref 13.3–17.7)
LMWH PPP CHRO-ACNC: 0.15 IU/ML
MAGNESIUM SERPL-MCNC: 2.2 MG/DL (ref 1.6–2.3)
MCH RBC QN AUTO: 29.3 PG (ref 26.5–33)
MCHC RBC AUTO-ENTMCNC: 29.6 G/DL (ref 31.5–36.5)
MCV RBC AUTO: 99 FL (ref 78–100)
O2/TOTAL GAS SETTING VFR VENT: ABNORMAL %
OXYHGB MFR BLDV: 41 %
OXYHGB MFR BLDV: 42 %
OXYHGB MFR BLDV: 46 %
OXYHGB MFR BLDV: 47 %
OXYHGB MFR BLDV: 47 %
OXYHGB MFR BLDV: 50 %
PCO2 BLDV: 47 MM HG (ref 40–50)
PCO2 BLDV: 48 MM HG (ref 40–50)
PCO2 BLDV: 49 MM HG (ref 40–50)
PCO2 BLDV: 51 MM HG (ref 40–50)
PH BLDV: 7.42 PH (ref 7.32–7.43)
PH BLDV: 7.43 PH (ref 7.32–7.43)
PH BLDV: 7.44 PH (ref 7.32–7.43)
PH BLDV: 7.45 PH (ref 7.32–7.43)
PHOSPHATE SERPL-MCNC: 2.6 MG/DL (ref 2.5–4.5)
PLATELET # BLD AUTO: 278 10E9/L (ref 150–450)
PO2 BLDV: 26 MM HG (ref 25–47)
PO2 BLDV: 27 MM HG (ref 25–47)
PO2 BLDV: 28 MM HG (ref 25–47)
PO2 BLDV: 28 MM HG (ref 25–47)
PO2 BLDV: 29 MM HG (ref 25–47)
PO2 BLDV: 31 MM HG (ref 25–47)
POTASSIUM BLD-SCNC: 3.7 MMOL/L (ref 3.4–5.3)
POTASSIUM SERPL-SCNC: 4 MMOL/L (ref 3.4–5.3)
POTASSIUM SERPL-SCNC: 4.3 MMOL/L (ref 3.4–5.3)
PROT SERPL-MCNC: 6.5 G/DL (ref 6.8–8.8)
RBC # BLD AUTO: 2.59 10E12/L (ref 4.4–5.9)
SODIUM SERPL-SCNC: 138 MMOL/L (ref 133–144)
SODIUM SERPL-SCNC: 141 MMOL/L (ref 133–144)
SPECIMEN EXP DATE BLD: NORMAL
WBC # BLD AUTO: 9.5 10E9/L (ref 4–11)

## 2017-04-24 PROCEDURE — 84132 ASSAY OF SERUM POTASSIUM: CPT | Performed by: INTERNAL MEDICINE

## 2017-04-24 PROCEDURE — 25000132 ZZH RX MED GY IP 250 OP 250 PS 637: Performed by: STUDENT IN AN ORGANIZED HEALTH CARE EDUCATION/TRAINING PROGRAM

## 2017-04-24 PROCEDURE — 93312 ECHO TRANSESOPHAGEAL: CPT | Mod: 26 | Performed by: INTERNAL MEDICINE

## 2017-04-24 PROCEDURE — 93325 DOPPLER ECHO COLOR FLOW MAPG: CPT | Mod: 26 | Performed by: INTERNAL MEDICINE

## 2017-04-24 PROCEDURE — 00000146 ZZHCL STATISTIC GLUCOSE BY METER IP

## 2017-04-24 PROCEDURE — 25000132 ZZH RX MED GY IP 250 OP 250 PS 637: Performed by: INTERNAL MEDICINE

## 2017-04-24 PROCEDURE — 25000128 H RX IP 250 OP 636: Performed by: STUDENT IN AN ORGANIZED HEALTH CARE EDUCATION/TRAINING PROGRAM

## 2017-04-24 PROCEDURE — 25000125 ZZHC RX 250: Performed by: INTERNAL MEDICINE

## 2017-04-24 PROCEDURE — 99152 MOD SED SAME PHYS/QHP 5/>YRS: CPT | Performed by: INTERNAL MEDICINE

## 2017-04-24 PROCEDURE — 40000275 ZZH STATISTIC RCP TIME EA 10 MIN

## 2017-04-24 PROCEDURE — 86850 RBC ANTIBODY SCREEN: CPT | Performed by: INTERNAL MEDICINE

## 2017-04-24 PROCEDURE — 93320 DOPPLER ECHO COMPLETE: CPT | Mod: 26 | Performed by: INTERNAL MEDICINE

## 2017-04-24 PROCEDURE — 25000128 H RX IP 250 OP 636

## 2017-04-24 PROCEDURE — 25000128 H RX IP 250 OP 636: Performed by: PEDIATRICS

## 2017-04-24 PROCEDURE — 25000128 H RX IP 250 OP 636: Performed by: INTERNAL MEDICINE

## 2017-04-24 PROCEDURE — 97110 THERAPEUTIC EXERCISES: CPT | Mod: GP

## 2017-04-24 PROCEDURE — 85520 HEPARIN ASSAY: CPT | Performed by: INTERNAL MEDICINE

## 2017-04-24 PROCEDURE — 82330 ASSAY OF CALCIUM: CPT | Performed by: INTERNAL MEDICINE

## 2017-04-24 PROCEDURE — 85027 COMPLETE CBC AUTOMATED: CPT | Performed by: INTERNAL MEDICINE

## 2017-04-24 PROCEDURE — 93325 DOPPLER ECHO COLOR FLOW MAPG: CPT

## 2017-04-24 PROCEDURE — T1013 SIGN LANG/ORAL INTERPRETER: HCPCS | Mod: U3

## 2017-04-24 PROCEDURE — 86901 BLOOD TYPING SEROLOGIC RH(D): CPT | Performed by: INTERNAL MEDICINE

## 2017-04-24 PROCEDURE — 25000125 ZZHC RX 250

## 2017-04-24 PROCEDURE — 20000004 ZZH R&B ICU UMMC

## 2017-04-24 PROCEDURE — 83735 ASSAY OF MAGNESIUM: CPT | Performed by: INTERNAL MEDICINE

## 2017-04-24 PROCEDURE — S0171 BUMETANIDE 0.5 MG: HCPCS

## 2017-04-24 PROCEDURE — 82805 BLOOD GASES W/O2 SATURATION: CPT | Performed by: INTERNAL MEDICINE

## 2017-04-24 PROCEDURE — 40000196 ZZH STATISTIC RAPCV CVP MONITORING

## 2017-04-24 PROCEDURE — 71010 XR CHEST PORT 1 VW: CPT

## 2017-04-24 PROCEDURE — 99291 CRITICAL CARE FIRST HOUR: CPT | Mod: 25 | Performed by: INTERNAL MEDICINE

## 2017-04-24 PROCEDURE — 25000125 ZZHC RX 250: Performed by: STUDENT IN AN ORGANIZED HEALTH CARE EDUCATION/TRAINING PROGRAM

## 2017-04-24 PROCEDURE — 80048 BASIC METABOLIC PNL TOTAL CA: CPT | Performed by: INTERNAL MEDICINE

## 2017-04-24 PROCEDURE — 97530 THERAPEUTIC ACTIVITIES: CPT | Mod: GP

## 2017-04-24 PROCEDURE — 25000132 ZZH RX MED GY IP 250 OP 250 PS 637

## 2017-04-24 PROCEDURE — 86900 BLOOD TYPING SEROLOGIC ABO: CPT | Performed by: INTERNAL MEDICINE

## 2017-04-24 PROCEDURE — 40000014 ZZH STATISTIC ARTERIAL MONITORING DAILY

## 2017-04-24 PROCEDURE — 99292 CRITICAL CARE ADDL 30 MIN: CPT | Mod: 25 | Performed by: INTERNAL MEDICINE

## 2017-04-24 PROCEDURE — 40000193 ZZH STATISTIC PT WARD VISIT

## 2017-04-24 PROCEDURE — 84100 ASSAY OF PHOSPHORUS: CPT | Performed by: INTERNAL MEDICINE

## 2017-04-24 PROCEDURE — 99153 MOD SED SAME PHYS/QHP EA: CPT | Performed by: INTERNAL MEDICINE

## 2017-04-24 PROCEDURE — 80076 HEPATIC FUNCTION PANEL: CPT | Performed by: INTERNAL MEDICINE

## 2017-04-24 PROCEDURE — 40000076 ZZH STATISTIC IABP MONITORING

## 2017-04-24 RX ORDER — BUMETANIDE 0.25 MG/ML
2 INJECTION INTRAMUSCULAR; INTRAVENOUS ONCE
Status: COMPLETED | OUTPATIENT
Start: 2017-04-24 | End: 2017-04-24

## 2017-04-24 RX ORDER — FENTANYL CITRATE 50 UG/ML
100 INJECTION, SOLUTION INTRAMUSCULAR; INTRAVENOUS ONCE
Status: COMPLETED | OUTPATIENT
Start: 2017-04-24 | End: 2017-04-24

## 2017-04-24 RX ORDER — HYDRALAZINE HYDROCHLORIDE 25 MG/1
25 TABLET, FILM COATED ORAL ONCE
Status: COMPLETED | OUTPATIENT
Start: 2017-04-24 | End: 2017-04-24

## 2017-04-24 RX ORDER — BUMETANIDE 0.25 MG/ML
2 INJECTION INTRAMUSCULAR; INTRAVENOUS ONCE
Status: COMPLETED | OUTPATIENT
Start: 2017-04-24 | End: 2017-04-25

## 2017-04-24 RX ORDER — HYDRALAZINE HYDROCHLORIDE 50 MG/1
50 TABLET, FILM COATED ORAL EVERY 8 HOURS SCHEDULED
Status: DISCONTINUED | OUTPATIENT
Start: 2017-04-24 | End: 2017-04-25

## 2017-04-24 RX ORDER — FENTANYL CITRATE 50 UG/ML
INJECTION, SOLUTION INTRAMUSCULAR; INTRAVENOUS
Status: COMPLETED
Start: 2017-04-24 | End: 2017-04-24

## 2017-04-24 RX ADMIN — POTASSIUM CHLORIDE 20 MEQ: 1.5 POWDER, FOR SOLUTION ORAL at 05:08

## 2017-04-24 RX ADMIN — VANCOMYCIN HYDROCHLORIDE 1250 MG: 10 INJECTION, POWDER, LYOPHILIZED, FOR SOLUTION INTRAVENOUS at 01:15

## 2017-04-24 RX ADMIN — PIPERACILLIN AND TAZOBACTAM 4.5 G: 4; .5 INJECTION, POWDER, FOR SOLUTION INTRAVENOUS at 16:43

## 2017-04-24 RX ADMIN — PIPERACILLIN AND TAZOBACTAM 4.5 G: 4; .5 INJECTION, POWDER, FOR SOLUTION INTRAVENOUS at 04:15

## 2017-04-24 RX ADMIN — Medication 12.5 MG: at 21:23

## 2017-04-24 RX ADMIN — MILRINONE LACTATE 0.38 MCG/KG/MIN: 200 INJECTION, SOLUTION INTRAVENOUS at 04:43

## 2017-04-24 RX ADMIN — ACETAMINOPHEN 650 MG: 325 TABLET, FILM COATED ORAL at 08:40

## 2017-04-24 RX ADMIN — Medication 7 ML: at 20:30

## 2017-04-24 RX ADMIN — HYDRALAZINE HYDROCHLORIDE 50 MG: 50 TABLET ORAL at 14:37

## 2017-04-24 RX ADMIN — FENTANYL CITRATE 50 MCG: 50 INJECTION INTRAMUSCULAR; INTRAVENOUS at 13:04

## 2017-04-24 RX ADMIN — OXYCODONE HYDROCHLORIDE 5 MG: 5 TABLET ORAL at 16:53

## 2017-04-24 RX ADMIN — HEPARIN SODIUM 600 UNITS/HR: 10000 INJECTION, SOLUTION INTRAVENOUS at 14:38

## 2017-04-24 RX ADMIN — BUMETANIDE 2 MG: 0.25 INJECTION INTRAMUSCULAR; INTRAVENOUS at 08:41

## 2017-04-24 RX ADMIN — ASPIRIN 81 MG CHEWABLE TABLET 324 MG: 81 TABLET CHEWABLE at 07:35

## 2017-04-24 RX ADMIN — MILRINONE LACTATE 0.38 MCG/KG/MIN: 200 INJECTION, SOLUTION INTRAVENOUS at 20:27

## 2017-04-24 RX ADMIN — PIPERACILLIN AND TAZOBACTAM 4.5 G: 4; .5 INJECTION, POWDER, FOR SOLUTION INTRAVENOUS at 21:23

## 2017-04-24 RX ADMIN — HYDRALAZINE HYDROCHLORIDE 25 MG: 25 TABLET ORAL at 05:09

## 2017-04-24 RX ADMIN — INSULIN GLARGINE 25 UNITS: 100 INJECTION, SOLUTION SUBCUTANEOUS at 07:37

## 2017-04-24 RX ADMIN — ONDANSETRON 4 MG: 2 INJECTION INTRAMUSCULAR; INTRAVENOUS at 21:23

## 2017-04-24 RX ADMIN — MIDAZOLAM 1 MG: 1 INJECTION INTRAMUSCULAR; INTRAVENOUS at 13:04

## 2017-04-24 RX ADMIN — Medication 220 MG: at 07:36

## 2017-04-24 RX ADMIN — POTASSIUM PHOSPHATE, MONOBASIC AND POTASSIUM PHOSPHATE, DIBASIC 10 MMOL: 224; 236 INJECTION, SOLUTION INTRAVENOUS at 14:37

## 2017-04-24 RX ADMIN — MULTIVIT AND MINERALS-FERROUS GLUCONATE 9 MG IRON/15 ML ORAL LIQUID 15 ML: at 07:36

## 2017-04-24 RX ADMIN — OXYCODONE HYDROCHLORIDE 5 MG: 5 TABLET ORAL at 02:00

## 2017-04-24 RX ADMIN — PANTOPRAZOLE SODIUM 40 MG: 40 TABLET, DELAYED RELEASE ORAL at 20:30

## 2017-04-24 RX ADMIN — PANTOPRAZOLE SODIUM 40 MG: 40 TABLET, DELAYED RELEASE ORAL at 07:36

## 2017-04-24 RX ADMIN — POTASSIUM CHLORIDE 20 MEQ: 29.8 INJECTION, SOLUTION INTRAVENOUS at 14:39

## 2017-04-24 RX ADMIN — FENTANYL CITRATE 50 MCG: 50 INJECTION, SOLUTION INTRAMUSCULAR; INTRAVENOUS at 13:04

## 2017-04-24 RX ADMIN — Medication 7 ML: at 07:36

## 2017-04-24 RX ADMIN — PIPERACILLIN AND TAZOBACTAM 4.5 G: 4; .5 INJECTION, POWDER, FOR SOLUTION INTRAVENOUS at 09:18

## 2017-04-24 RX ADMIN — ATORVASTATIN CALCIUM 40 MG: 40 TABLET, FILM COATED ORAL at 20:29

## 2017-04-24 RX ADMIN — VANCOMYCIN HYDROCHLORIDE 1250 MG: 10 INJECTION, POWDER, LYOPHILIZED, FOR SOLUTION INTRAVENOUS at 19:02

## 2017-04-24 RX ADMIN — HYDRALAZINE HYDROCHLORIDE 50 MG: 50 TABLET ORAL at 21:23

## 2017-04-24 RX ADMIN — HYDRALAZINE HYDROCHLORIDE 25 MG: 25 TABLET ORAL at 07:35

## 2017-04-24 ASSESSMENT — PAIN DESCRIPTION - DESCRIPTORS
DESCRIPTORS: SORE
DESCRIPTORS: SORE
DESCRIPTORS: PATIENT UNABLE TO DESCRIBE

## 2017-04-24 NOTE — PROGRESS NOTES
Calorie Counts  Intake recorded for: 4/23  Kcals: 0  Protein: 0g  # Meals Recorded: no meals ordered from kitchen, no intake recorded.   # Supplements Recorded: no intake recorded.

## 2017-04-24 NOTE — PLAN OF CARE
Problem: Goal Outcome Summary  Goal: Goal Outcome Summary  1) pt will be hemodynamically stable  2) pt will tolerate weaning of IABP  3) pain will be adequately controlled     OT: 4E: Pt refusing OT at set  time 2/2 fatigue, despite encouragement. OT cx'd and rescheduled for 4/25/17

## 2017-04-24 NOTE — PROGRESS NOTES
Social Work Services Progress Note    Hospital Day: 29  Date of Initial Social Work Evaluation:  3/28/17  Collaborated with:  Mike German, RNCC, team rounds, chart review    Data:    Pt remains in ICU with subclavian pump.  Plan to reassess for LVAD when appropriate.    Intervention:    Spoke with Mike German who completed FMLA and short-term disability paperwork.  Faxed paperwork directly to pt's employer [employer] (Thierry Qiu: p: [phone], f: [phone]).  Attempted to meet with pt's family to give them to originals, but they were not present.    Assessment:   Unable to assess pt/family today as they were not available.    Plan:    Anticipated Disposition:  Facility:  likely will need TCU.    Barriers to d/c plan:  Medical readiness, potential LVAD pending EF with subclavian pump removal    Follow Up:  SW will f/u with pt's dtr Camtu on Tuesday to provide her with paperwork as above.    LUIS Villafana, [facility]  Adult ICU Clinical   Pager [phone]

## 2017-04-24 NOTE — PLAN OF CARE
Problem: Goal Outcome Summary  Goal: Goal Outcome Summary  Outcome: Improving  IABP therapy continues. Turned down to 1:3 around 0945, 100% augement. Patient has tolerated well. MAPs in 80's-90's all day. Up to chair x1. Pain in buttocks from pressure wound. Multiple BM's. Milrinone and heparin therapy continue. TF at 50 with dysphagia 2 diet and nectar thick liquids, patient getting food from home which he likes better than here, tolerating well. UOP adequate, given diuresis with bumex to supplement UOP. GENE at 1600 CVP 13, CO 3.8, CI 2.4, SVR 1477. Sinus tachycardia 110's-130's. Afebrile. Patient and family updated via . See flowsheets for further details.     Plan: Continue with IABP therapy through night, SAMEERA tomorrow to evaluate mitral valve function. Will update service with changes or concerns regarding patient condition.

## 2017-04-24 NOTE — PLAN OF CARE
Problem: Goal Outcome Summary  Goal: Goal Outcome Summary  PT 4E: Pt with inc activity tolerance on this date. Engaged in bed mobility, sitting balance, transfer, and LE strength training. Requires mod A supine>sit, min-mod Ax2 sit<>stand, and min Ax2 bed>chair with HHA. Balance and strength deficits limiting inc IND with transfers. Good tolerance to LE exercises to improve functional strength, however pt with limited command following and requires simple, one-step commands to help facilitate improved participation. Hemodynamically stable throughout session on 3 LPM via NC. No IABP alarms. Continues to benefit from skilled PT to address stated deficits in order to progress towards IND and safe PLOF.      REC: IP rehab once medically stable.

## 2017-04-24 NOTE — PLAN OF CARE
Problem: Goal Outcome Summary  Goal: Goal Outcome Summary  1) pt will be hemodynamically stable  2) pt will tolerate weaning of IABP  3) pain will be adequately controlled  Outcome: No Change        D/I/A: Pt A&Ox4. ST, 's-130's. On 3LPM NC, lung sounds coarse all lobes, diminished lower lobes. TF held at 0930 and NPO for bedside SAMEERA today. 2 mg bumex given this AM, pt diuresing well. Small BM this afternoon. Pain at IABP site getting better, controlled with tylenol. Worked with PT and sat in chair with assistx2. Sacrum ulcer pink and painful. New mattress and chair cushing ordered. Daughter present and updated.     P: Continue to monitor/assess pt, provide pain management, contact MD with questions/concerns.      Ronaldo Pandey  RN, BSN

## 2017-04-24 NOTE — PROGRESS NOTES
Care Coordinator Progress Note     Admission Date/Time:  3/27/2017  Attending MD:  Poornima Hanley, *     Data  Chart reviewed, discussed with interdisciplinary team.   Patient transferred from [facility] with cardiogenic shock.     Assessment  Pt remain in ICU with IABP.  Per moring report and chart review, plan to have SAMEERA done today.  Visited pt and dtr for support.  Pt was sleeping on/off at time of my visit and spoke mostly with pt dtr.  Pt dtr stated the team have been updating them well about the plan of care.  Pt dtr stated they understand LVAD plan is on hold at this time and hopping his heart will recover and will not need any device.  PT/OT are following and Rehab is rec. at this time.     Plan  Anticipated Discharge Date:  TBD.  Anticipated Discharge Plan:   Rehab.  SW will assist with rehab placement.  CC will cont to follow plan of care.      Luis Antonio Woods RN, BSN  4A and 4E/ ICU  Care Coordinator  Phone: [phone]  Pager: [phone]

## 2017-04-24 NOTE — PROGRESS NOTES
General Infectious Disease Service Progress Note       Patient:  Luke Henao   Date of birth 1965, Medical record number [id number]  Date of Visit:  04/24/2017  Date of Admission: 3/27/2017           Assessment and Recommendations:   ASSESSMENT:  Luke Henao is a 51 year old [place] man with history of diabetes, now with cardiogenic shock from an inferior STEMI, s/p RCA aspiration thrombectomy and multiple stent placements.   He is now inotrope and IABP-dependent, and spiking intermittent fevers. Possible etiology of shock includes cardiogenic +/- distributive with likely infection such as HAP with CXR consistent with RUL opacity.   Though UA with pyuria but the Ucx was negative on 4/19/2017.   Recent urine cx positive for E.coli and endotracheal samples with MSSA and Haemophilus influenzae.     Since the patient is not known to be colonized with MRSA, zosyn should be able to cover MSSA and H influenza which grew in the recent past from [facility], also covers other nosocomial potential pathogens.     We should consider bronchoscopy if fever recurred in view of unexplained worsening right lung infiltrate in a foreign born patient.   We should also consider thromboembolic phenomenon causing this intermittent fever with the recent U&LE doppler results.     RECOMMENDATIONS:    Continue Zosyn for now.     DC vancomycin.      If fever continues to recur we should consider bronchoscopy and BAL to right lung.            Interim History:   Fever over the weekend noted.   No new complaints today. No events overnight.   Now afebrile.   Undergoing SAMEERA to evaluate whether he can tolerate IABP weaning trials.          History of Present Illness:   Luke Henao is a 51 year old [place] man with history of diabetes, who presented to [facility] via EMS on 3/28/17. He complained initially of a 2 day history of flu-like symptoms, with nausea and vomiting. On EMS arrival, he was noted to by hypotensive, and EKG  was concerning for inferior STEMI. He went to the cath lab urgently, found to have severe LAD disease and LCx stenosis. He underwent aspiration thrombectomy and POBA of 2 sites, felt to need CABG, though thought to be a poor surgical candidate. While in the cath lab, was noted to be persistently hypotensive, and had an episode of hematemesis. He also developed complete heart block with bradycardia, and a temporary pacemaker and IABP were placed. He was started on dopamine and then transferred to [facility] for further management. On arrival, he underwent PCI with 7 stents placed. Since then, he has remained hypotensive and IABP-dependent, now on inotropes with replacement of a subclavian IABP.     The infectious disease team has been consulted for assistance in managing antimicrobials given intermittent fevers. Hospital course notable for fevers on 3/27 and 3/28 (with sputum culture growing Staph aureus and Haemophilus influenzae), thought secondary to possible aspiration pneumonia, and treated with vancomycin + zosyn, narrowed to Unasyn alone 3/30 through 4/3, then broadened again to vancomycin + Zosyn from 4/3 through 4/4), with Zosyn alone from 4/4 through 4/7. He was fever free and off all antibiotics from 4/7 through 4/15. Urine culture from 4/12 grew 10-50K colonies of E.coli. He spiked a low-grade fever on 4/15 and was started back on vancomycin + Zosyn, narrowed to ceftriaxone alone on 4/16 (to cover the E.coli UTI) through 4/19. Subclavian IABP was placed on 4/19, though he spiked a high fever to 102.2 and was broadened to vancomycin + zosyn again. PICC line replaced today, and [place] Benjy catheter removed.    Today, the patient reports his biggest concern is pain in the RUE surrounding the PICC site. A new PICC has been placed in his LUE, though the old line remained in place until all IV access was secured. He denies nausea, vomiting, abdominal pain, constipation, diarrhea, headaches, SOB, chest pain or cough. No  rashes or concerning lesions. He denies any dysuria, though urinary catheter remains in place.     Antibiotic History:    Vancomycin + Zosyn (3/27 - 3/30)    Unasyn (3/30 - 4/3)    Vancomycin + Zosyn (4/3 - 4/4)    Zosyn (4/4 - 4/7)    Vancomycin + Zosyn (4/15 - 4/16)    Ceftriaxone (4/16 - 4/19)    Vancomycin + Zosyn (4/19 - current)    Notable Microbiology:    Sputum (endotracheal) sample from 3/28 with Staph aureus and Haemophilus influenzae    Influenza A/B negative on 3/28    Sputum (endotracheal) sample from 3/29 with Staph aureus    C.diff PCR negative on 4/7    Urine culture from 4/12 with 10-50K colonies Escherichia coli (penicillin and fluoroquinolone-resistant)    Urine culture from 4/15 with >100K colonies E. Coli (penicillin and fluoroquinolone-resistant)    Multiple blood cultures this hospitalization remain NGTD           Review of Systems:   A full 10-point review of systems was obtained and is negative except for the mentioned in the HPI above.           Past Medical History:     Patient Active Problem List   Diagnosis     STEMI (ST elevation myocardial infarction) (H)     Acute MI (H)     Cardiogenic shock (H)            Past Surgical History:     Past Surgical History:   Procedure Laterality Date     COLONOSCOPY N/A 4/17/2017    Procedure: COLONOSCOPY;  Surgeon: Rashaad Bundy MD;  Location: [facility]     INSERT INTRAAORTIC BALLOON PUMP Right 4/19/2017    Procedure: INSERT INTRAAORTIC BALLOON PUMP;  Right Subclavian Intra Aortic Balloon Pump Insertion using [place] 40cc Ballon Catheter, Implentation of 8mm Gelweave Woven Vascular Prosthesis, Removal of Left Femoral Ballon Pump Catheter, Flouroscopy;  Surgeon: Keshav Leung MD;  Location: [facility]                Social History:     Social History   Substance Use Topics     Smoking status: Current Every Day Smoker     Packs/day: 0.50     Types: Cigarettes     Smokeless tobacco: Not on file     Alcohol use Not on file     History   Sexual  Activity     Sexual activity: Not on file            Current Medications (antimicrobials listed in bold):       hydrALAZINE  50 mg Oral Q8H ANNITA     insulin aspart  1-12 Units Subcutaneous Q4H     insulin glargine  25 Units Subcutaneous QAM AC     vancomycin (VANCOCIN) IV  1,250 mg Intravenous Q18H     aspirin  324 mg Oral or Feeding Tube Daily     heparin lock flush  5-10 mL Intracatheter Q24H     QUEtiapine  12.5 mg Oral At Bedtime     piperacillin-tazobactam  4.5 g Intravenous Q6H     pantoprazole  40 mg Per Feeding Tube BID     zinc sulfate (20 mg [place]. Zn/mL)  220 mg Per Feeding Tube Daily     vitamin A-D & C drops  7 mL Per Feeding Tube BID     atorvastatin  40 mg Oral or Feeding Tube Daily at 8 pm     pneumococcal vaccine  0.5 mL Intramuscular Once     sodium chloride (PF)  3 mL Intracatheter Q8H     multivitamins with minerals  15 mL Per Feeding Tube Daily            Allergies:   No Known Allergies         Physical Exam:   Ranges for vital signs:  Temp:  [98  F (36.7  C)-99.3  F (37.4  C)] 98  F (36.7  C)  Heart Rate:  [115-134] 115  Resp:  [18-22] 18  MAP:  [66 mmHg-92 mmHg] 83 mmHg  Arterial Line BP: ()/(44-72) 115/58  SpO2:  [90 %-100 %] 99 %    Physical Examination:  GENERAL:  Alert, interactive, lying in bed in no acute distress.   HEENT:  Head is normocephalic, atraumatic.   EYES:  Eyes have anicteric sclerae without conjunctival injection or stigmata of endocarditis.    ENT:  Oropharynx is moist without exudates or ulcers. Tongue is midline. Dentition intact. NG in place.  LUNGS:  CTAB bilaterally anteriorly, no wheezes. Normal respiratory effort on 2 L NC.  CARDIOVASCULAR:  Tachycardic, regular rhythm.  ABDOMEN:  Normal bowel sounds, soft, non-tender. Non-distended. No appreciable hepatosplenomegaly.  : [place] in place draining clear yellow urine.  SKIN:  No acute rashes. Site of new IABP in the right chest wall and the Site of old IABP in right groin are clear and dry, with no erythema or  purulence noted. Lines are in place without any surrounding erythema or exudate.             Laboratory Data:     Inflammatory Markers    Recent Labs   Lab Test  04/08/17   0425   CRP  18.0*       Hematology Studies    Recent Labs   Lab Test  04/24/17   0414  04/23/17   0525  04/22/17   1813  04/22/17   0350  04/21/17   1828  04/21/17   0419   03/27/17   0551   03/26/17   2253  03/26/17   1800   WBC  9.5  7.4  7.9  8.8  10.7  13.1*   < >  15.9*   < >  18.6*  14.3*   ANEU   --    --    --    --    --    --    --   10.7*   --   15.4*  12.1*   AEOS   --    --    --    --    --    --    --   0.0   --   0.0  0.0   HGB  7.6*  7.5*  7.8*  7.9*  8.1*  8.1*   < >  13.1*   < >  13.6  15.7   MCV  99  100  99  99  98  100   < >  90   < >  91  90   PLT  278  268  237  206  189  201   < >  177   < >  196  204    < > = values in this interval not displayed.       Immune Globulin Studies    Recent Labs   Lab Test  04/18/17   2349   IGG  1610   IGM  63   IGA  394*       Metabolic Studies     Recent Labs   Lab Test  04/24/17   1215  04/24/17   0414  04/23/17   1627  04/23/17   0804  04/23/17   0525  04/22/17   1641   04/22/17   0350   NA   --   141  139   --   140  140   --   142   POTASSIUM  3.7  4.0  3.8  4.1  4.0  4.0   < >  4.0   CHLORIDE   --   102  101   --   100  103   --   104   CO2   --   31  33*   --   32  30   --   31   BUN   --   21  22   --   22  22   --   22   CR   --   1.12  1.18   --   1.23  1.14   --   1.24   GFRESTIMATED   --   69  65   --   62  68   --   62    < > = values in this interval not displayed.       Hepatic Studies    Recent Labs   Lab Test  04/24/17   0414 04/23/17   0525  04/22/17   0350  04/21/17   0419  04/20/17   0345  04/19/17   0340   BILITOTAL  0.6  1.0  0.5  0.5  0.4  0.5   ALKPHOS  62  68  63  63  57  63   ALBUMIN  1.9*  2.0*  1.9*  2.3*  2.1*  2.2*   AST  24  35  49*  21  27  40   ALT  38  49  49  38  43  51       Thyroid Studies    Recent Labs   Lab Test  04/14/17   1751  04/08/17   0425    TSH  2.54  1.33       Microbiology:  Culture Micro   Date Value Ref Range Status   04/23/2017 No growth after 1 day  Final   04/20/2017 No growth  Final   04/20/2017   Final    Canceled, Test credited Quantity not sufficient Notification of test cancellation   was given to ERIC ROSENBAUM ON UU4E, REORDERED FOR RN TO RECOLLECT     04/20/2017 No growth after 4 days  Final   04/19/2017 No growth after 5 days  Final   04/19/2017 No growth after 5 days  Final   04/19/2017 No growth  Final   04/15/2017 No growth  Final   04/15/2017 >100,000 colonies/mL Escherichia coli (A)  Final   04/15/2017 Canceled, Test credited Duplicate request  Final   04/12/2017 10,000 to 50,000 colonies/mL Escherichia coli (A)  Final   04/12/2017 No growth  Final   04/12/2017 No growth  Final   04/05/2017 No growth  Final   04/03/2017 No growth  Final   04/03/2017 No growth  Final   04/03/2017 No growth  Final   04/03/2017 No growth  Final   03/29/2017 (A)  Final    Moderate growth Staphylococcus aureus Susceptibility testing done on previous   specimen     03/29/2017 No growth  Final   03/28/2017 No growth  Final   03/28/2017 (A)  Final    Heavy growth Staphylococcus aureus  Heavy growth Haemophilus influenzae Beta lactamase negative Beta-lactamase   negative Haemophilus influenzae are usually susceptible to ampicillin,   amoxacillin/clavulanic acid, levofloxacin, and 3rd generation cephalosporins,   such as ceftriaxone.  Light growth Normal brianda     03/27/2017 No growth  Final   03/26/2017 No growth  Final   03/26/2017 No growth  Final       Urine Studies    Recent Labs   Lab Test  04/20/17   1711  04/19/17   2214  04/15/17   0135  04/12/17   1852  04/08/17   0705   LEUKEST  Large*  Large*  Large*  Moderate*  Negative   WBCU  73*  59*  50*  5*  1       Vancomycin Levels    Recent Labs   Lab Test  04/23/17   0525  04/21/17   1118  04/07/17   0403   VANCOMYCIN  16.7  20.6  14.1       Hepatitis B Testing   Recent Labs   Lab Test  04/15/17   0350    HBCAB  Reactive   A reactive result indicates acute, chronic or past/resolved hepatitis B   infection.  *   HEPBANG  Nonreactive     Hepatitis C Testing     Hepatitis C Antibody   Date Value Ref Range Status   04/15/2017  NR Final    Nonreactive   Assay performance characteristics have not been established for newborns,   infants, and children                Relevant Imaging:   CXR 4/24/2017 REVIEWED BY MYSELF  IMPRESSION:   1. Stable intra-aortic balloon pump marker near the aortic arch, just  above the level of the suzanne.   2. Increased patchy pulmonary opacities, right more than left.  Findings suggestive of worsening infection or pulmonary edema.    Doppler 4/23/2017 of bilateral UE and LE  IMPRESSION:  1. Small amount of peripheral nonocclusive thrombus in the left  internal jugular vein.  2. No evidence of deep venous thrombosis in the right upper  extremity.  IMPRESSION:  1. Small amount of chronic appearing, nonocclusive thrombus in the  left common femoral vein.  2. No DVT in the right lower extremity.      CT CHEST/ABDOMEN/PELVIS, 3/27/17:  IMPRESSION:   1. Small mediastinal hemorrhage, small bilateral pleural effusions  which may be hemorrhagic. Small intraperitoneal hemorrhage. Minimal  pericardial hemorrhage.  2. Minimal pneumoperitoneum in the left paracolic gutter, of unknown  significance. No pneumatosis as clinically questioned.  3. IABP slightly low in position.  4. Bilateral pulmonary dependent consolidations represent compression  atelectasis, however differentials include aspiration      CT ABDOMEN/PELVIS W/OUT CONTRAST, 4/4/17:  IMPRESSION:   1. No evidence of ischemic bowel, obstruction, or intra-abdominal  infection.  2. Bibasilar patchy pulmonary opacities likely represent combination  of aspiration and atelectasis.  3. Small amount of simple free fluid within the lower abdomen.  4. Small left retroperitoneal hematoma along the iliac vasculature  likely postprocedural.   5. Unchanged size  of bilateral pleural effusions, which now consist of  simple fluid.    CT ABDOMEN PELVIS W/O CONTRAST, 4/11/2017 6:39 PM  COMPARISON: CT on 4/4/2017.  HISTORY: abdominal pain and maroon BMs.  Impression:  1. Stable small retroperitoneal hemorrhage adjacent to left iliac  vessels in the left low pelvis.   2. Slightly improved small bilateral pleural effusions and associated  bibasilar opacities, likely representing pulmonary edema.  3. Previously seen intraperitoneal fluid has resolved.  4. Intra-aortic balloon pump in the descending thoracic aorta,  superior tip out of view.

## 2017-04-24 NOTE — PLAN OF CARE
Problem: Goal Outcome Summary  Goal: Goal Outcome Summary  Outcome: Improving  Progress Note:  Pt alert and oriented x4 with intermittent confusion to time.  IABP 1:3 with EKG trigger and 100% augmentation, MAP 70-80's.  Minor complaints of pain at right shoulder incision site, relieved with Oxycodone.  CVP 13/11/11, CI 2.3/2.5/2.5.  Heparin remains at 600 units/hr, anti-xa therapeutic this morning.  Milrinone at 0.375 mcg/kg/min.  Family updated this morning.  Progressing towards goals, continue with plan of care.

## 2017-04-24 NOTE — PROGRESS NOTES
"[facility]  Cardiology II Service / Advanced Heart Failure        Interval History:   - Remained stable on 1:3 IABP (on therapeutic heparin inf)  - Afebrile, SCr improving, BCx remain negative        Intake/Output Summary (Last 24 hours) at 04/24/17 0801  Last data filed at 04/24/17 0700   Gross per 24 hour   Intake             2812 ml   Output             3060 ml   Net             -248 ml           Pertinent Medications:  I have reviewed this patient's current medications      hydrALAZINE  50 mg Oral Q8H ANNITA     insulin aspart  1-12 Units Subcutaneous Q4H     insulin glargine  25 Units Subcutaneous QAM AC     vancomycin (VANCOCIN) IV  1,250 mg Intravenous Q18H     aspirin  324 mg Oral or Feeding Tube Daily     heparin lock flush  5-10 mL Intracatheter Q24H     QUEtiapine  12.5 mg Oral At Bedtime     piperacillin-tazobactam  4.5 g Intravenous Q6H     pantoprazole  40 mg Per Feeding Tube BID     zinc sulfate (20 mg [place]. Zn/mL)  220 mg Per Feeding Tube Daily     vitamin A-D & C drops  7 mL Per Feeding Tube BID     atorvastatin  40 mg Oral or Feeding Tube Daily at 8 pm     pneumococcal vaccine  0.5 mL Intramuscular Once     sodium chloride (PF)  3 mL Intracatheter Q8H     multivitamins with minerals  15 mL Per Feeding Tube Daily         Examination:  /74 (BP Location: Left arm)  Temp 99.3  F (37.4  C) (Axillary)  Resp 22  Ht 1.575 m (5' 2.01\")  Wt 58.7 kg (129 lb 6.6 oz)  SpO2 99%  BMI 23.66 kg/m2  GEN: A, cooperative and conversational   NECK: can not assess JVP   RESP: crackles   CV: S1S2S3, holossystolic murmur at the apex  ABD: Soft, nontender to palpation, no HSM, +BS, no guarding  EXT: No peripheral edema, warm/well perfused   NEURO: CN II-XII intact, normal 5/5 strength in all extremitites  SKIN: Normal skin turgor, no rash on limited exam    Data:  CMP  Recent Labs  Lab 04/24/17  0414 04/23/17  1627 04/23/17  0804 04/23/17  0525 04/22/17  1641  04/22/17  0350  " 04/21/17 0419 04/20/17 2048    139  --  140 140  --  142  < > 143  --   --    POTASSIUM 4.0 3.8 4.1 4.0 4.0  < > 4.0  < > 3.6  < > 3.5   CHLORIDE 102 101  --  100 103  --  104  < > 105  --   --    CO2 31 33*  --  32 30  --  31  < > 33*  --   --    ANIONGAP 9 5  --  7 7  --  8  < > 6  --   --    * 192*  --  214* 243*  --  104*  < > 66*  --   --    BUN 21 22  --  22 22  --  22  < > 16  --   --    CR 1.12 1.18  --  1.23 1.14  --  1.24  < > 1.17  --   --    GFRESTIMATED 69 65  --  62 68  --  62  < > 66  --   --    GFRESTBLACK 84 79  --  75 82  --  75  < > 80  --   --    ROB 8.0* 7.9*  --  7.8* 7.6*  --  7.8*  < > 7.6*  --   --    MAG 2.2 2.2  --  2.2 2.1  --  2.2  < > 2.4*  < > 2.0   PHOS  --   --   --   --  3.1  --  3.2  --  3.4  --  2.0*   PROTTOTAL 6.5*  --   --  6.9  --   --  6.5*  --  7.0  --   --    ALBUMIN 1.9*  --   --  2.0*  --   --  1.9*  --  2.3*  --   --    BILITOTAL 0.6  --   --  1.0  --   --  0.5  --  0.5  --   --    ALKPHOS 62  --   --  68  --   --  63  --  63  --   --    AST 24  --   --  35  --   --  49*  --  21  --   --    ALT 38  --   --  49  --   --  49  --  38  --   --    < > = values in this interval not displayed.  CBC    Recent Labs  Lab 04/24/17 0414 04/23/17  0525 04/22/17  1813 04/22/17  0350   WBC 9.5 7.4 7.9 8.8   RBC 2.59* 2.55* 2.63* 2.57*   HGB 7.6* 7.5* 7.8* 7.9*   HCT 25.7* 25.6* 26.1* 25.4*   MCV 99 100 99 99   MCH 29.3 29.4 29.7 30.7   MCHC 29.6* 29.3* 29.9* 31.1*   RDW 17.0* 16.9* 17.0* 17.4*    268 237 206     INR    Recent Labs  Lab 04/20/17  0345 04/19/17  1159   INR 1.25* 1.23*     Arterial Blood Gas    Recent Labs  Lab 04/24/17  0413 04/24/17  0003 04/23/17  1933 04/23/17  1627  04/22/17  1157  04/21/17  0838  04/19/17  1603 04/19/17  1421   PH  --   --   --   --   --  7.46*  --  7.51*  --  7.49* 7.52*   PCO2  --   --   --   --   --  45  --  39  --  38 34*   PO2  --   --   --   --   --  112*  --  111*  --  125* 135*   HCO3  --   --   --   --   --  32*  --   31*  --  29* 28   O2PER 3LNC 3LNC 3LNC 3LNC  < > 3L  3L  < > 3L  3L  < > 3l 40   < > = values in this interval not displayed.    Imaging Studies:  Echo 3/27  The visual ejection fraction is estimated at 30-35%.  There is extensive inferior, inferoseptal, and inferolateral wall akinesis.  Basal/mid lateral wall hypokinesis  There is moderate to severe septal hypokinesis.  Septal wall motion abnormality may reflect pacemaker activation.  There is a catheter/pacemaker lead seen in the right ventricle.  The right ventricle is mildly dilated.  Severely decreased right ventricular systolic function  There is no pericardial effusion.  The rhythm was paced.  Compared to the prior study, the LVEF appears somewhat decreased. Septal wall  motion abnormality larger- may reflect, in part, that patient in sinus tach on  prior study, and ventricular paced now. No hemodynamically significant  valvular abnormalities on 2D or color flow imaging     CT chest/abdomen 3/27   IMPRESSION:   1. Small mediastinal hemorrhage, small bilateral pleural effusions  which may be hemorrhagic. Small intraperitoneal hemorrhage. Minimal  pericardial hemorrhage.  2. Minimal pneumoperitoneum in the left paracolic gutter, of unknown  significance. No pneumatosis as clinically questioned.  3. IABP slightly low in position.  4. Bilateral pulmonary dependent consolidations represent compression  atelectasis, however differentials include aspiration.     Cath 3/26 [facility]  SUMMARY:   --Inferior STEMI w/ late presentation. Culprit dictated by complete  heart block, and cardiogenic shock. Emergent echo in the Cath Lab also  shows significant RV dysfunction.  --Three vessel coronary artery disease with diffuse coronary disease  out to the distal vessels  --Successful POBA of the prox and mid-RCA. Stent was not placed, in  anticipation he may need to be considered for bypass surgery in the  near future  --Insertion of a temporary pacemaker for  bradycardia (AVB) and  hypotension  --Insertion of a IABP  --Upper GI bleed consistent with Kaylin-Bonilla     Cath [place] 3/27     CT head 3/28: normal      Echo 3/29  Interpretation Summary  Limited study. Ischemic CM. Moderately (EF 30-35%) reduced left ventricular  function is present. Traced at 32%.  Posterior wall akinesis is present. Lateral wall akinesis is present. Inferior  wall akinesis is present.  Right ventricular function, chamber size, wall motion, and thickness are  normal.  Dilation of the inferior vena cava is present with abnormal respiratory  variation in diameter.     Compared to prior study, RV fxn is improved.     Echo 3/31  Left Ventricle  The Ejection Fraction is estimated at 50-55%. Inferior,posterior,lateral,basal  septal wall akinesis as reported before. No change.     CORONARY ANGIOGRAM 4/1:   1. Both coronary arteries arise from their respective cusps.  2. Right dominant.  3. LM has mild luminal irregularities.   4. LAD supplies the apex along with the RCA (type 2 LAD) and gives rise to septal perforators and a large and branching D1. There are widely patent stents extending from the proximal to mid LAD. The dLAD has mild to moderate disease to 30% stenosis. The D1 is jailed by the LAD stents, but has EBER 3 flow with disease to 30% stenosis.   5. The small ramus is no longer present.  6. LCX is occluded at the ostium.   7. RCA gives rise to PL branches and supplies PDA. There are widely patent stents extending from the proximal to mid RCA. The remainder of the mRCA has disease to 50% stenosis and the dRCA has disease to 10% stenosis. The RPDA has a widely patent stent and the remainder of the artery has mild disease to 10% stenosis. The RPLA has small vessels with diffuse disease including a distal branch occlusion. The RPLA supplies some small collateral branches to the dLCx.      COMPLICATIONS:  1. None      SUMMARY:   1. Cardiogenic shock following an inferior STEMI.  2. The LCx  re-occlusion is not surprising as the recently revascularized LCx  had poor outflow and the revascularized portion did not supply a large territory (limited to no flow was present distal to the stented segment immediately following stenting). Repeat revascularization of the LCx would result in the same outcome of repeat closure and also risk embolizing thrombus from the LCx into the LAD so no treatment of the occluded LCx was performed.   3. Three vessel coronary artery disease with patent LAD and RCA stents and an occluded LCx.     Echo 4/3  Left ventricular size is normal.  The Ejection Fraction is estimated at 35-40%.  Inferior wall akinesis is present.  Lateral wall akinesis is present.  Posterior wall akinesis is present.  Right ventricular function, chamber size, wall motion, and thickness are  normal.  Moderate mitral insufficiency is present.  Moderate tricuspid insufficiency is present.  Right ventricular systolic pressure is 47mmHg above the right atrial pressure.  The inferior vena cava is normal.     Echo 4/5  Moderately (EF 35-40%) reduced left ventricular function is present. Traced at  40%.  Moderate mitral insufficiency is present.  The cause of the mitral insufficiency appears to be restricted posterior  leaflet from posterior MI. No mitral leaflet pathology seen.  Dilation of the inferior vena cava is present with abnormal respiratory  variation in diameter.     CT abd without contrast 4/4  IMPRESSION:   1. No evidence of ischemic bowel, obstruction, or intra-abdominal  infection.  2. Bibasilar patchy pulmonary opacities likely represent combination  of aspiration and atelectasis.  3. Small amount of simple free fluid within the lower abdomen.  4. Small left retroperitoneal hematoma along the iliac vasculature  likely postprocedural.   5. Unchanged size of bilateral pleural effusions, which now consist of  simple fluid.    Assessment and Plan  51 year old man presented with cardiogenic shock  from inferior STEMI s/p RCA aspiration thrombectomy and POBA on 3/26 at [facility] s/p IABP and initiation of Dopamine, also during procedure, CHB s/p temp pacer, emesis/hematmesis and altered mental status s/p intubation transferred here for consideration of mechanical support on 3/27. Had PCI to RCA, LAD and LCx (on ASA and plavix) started on epinephrine in addition to dopamine, post procedure developed distributive shock picture from infection (aspiration pneumonia? Sputum cx growing staph aureus, on vanco and zosyn) vs post-MI SIRS response. Currently in cardiogenic shock with IABP in situ    Card  # Cardiogenic shock 4/3- from underlying 3v CAD-has had high SVR and low CI, complicated by ischemic MR  # Due to inferior wall STEMI. S/P 7 stents to LAD, circ, RCA (angiogram report pending). Now with IABP, temporary pacemaker after 3rd deg heart block in cath lab at [facility] on 3/26   # Small pericardial hemorrhage   # Remains unclear why we are having a hard time weaning him off of IABP in the setting of EF of 3-35%. Microvascular disease is a possibility.  - IABP in place  - Hold off on isordil for now  - Cortisol normal  - Continue Milrinone 0.375  - Plan to keep CVP 9-12  - Increasing Hydralazine as tolerated  - f/u blood cultures  - Follow up heart endomyocardial bx results  - SAMEERA today on IABP down to 1:3 to assess his MR    # Neuro  - alert, follows commands  - WBC stable   - Seroquel 25 mg         Respiratory  #Intubated for airway protection due to mental status and emesis on 3/26, extubated 4/3  #Probable Aspiration PNA/pneumonitis vs MSSA pneumonia         # ID  - UA 4/15 with cloudy urine, 50 WBCs, many bacteria, no nitrates. UCx grew E coli (sens to CFTX - on tx)  - Fever on 4/20 and single spike 100.2 overnight with blood cultures in process (no growth thus far), PICC and SGC removed, started on Vanc and pip/tazo  - ID consult placed; appreciate recs         # Endocrine  T2DM: HA1C 7.5 on  admission.  - Insulin gtt per [facility] protocol with hypoglycemia precautions.   - Switch to SQ Lantus 4/22      # GI  - Had several maroon BMs since 4/11, Hb overall dropped by a 1.5 grams or so, heparin inf was held and GI consulted  - Colonoscopy 4/17 showed a rectal ulcer but no intervenable lesions  - Will continue to monitor Hb, back on heparin inf  - Protonix 40 PO BID         # Renal/  - Daily Cr  - Avoid nephrotoxins as able  - Plan to keep CVP 9-12      -Hypernatremia  - Resolved. Stopped his D5W, only getting FWF's.        FEN: feeding tube, and dysphagia level II  Code: FULL  PPx: PPI 40mg BID, senna PRN  Dispo: pending stability    LINES:  - SC IABP  4/19  - LUE PICC   4/20  - Chavis catheter  4/20        Plan of care discussed with Dr. Yue Loving MD  Cardiovascular Disease Fellow  Pager: [phone]  Critical Care ICU Note - Cardiology  Marvin Tinsley M.D.    Impression:      The patient remains unstable in the ICU with on-going need for ventilator support, parenteral medications for the adjustment of blood pressure and cardiac output and maintenance of renal function.      The patient is seen for ; shock requiring vasopressor and or inotropic agents; low cardiac output necessitating  inotropic agents, vasopressosepsis requiring antibiotic selection and monitoring, vasopressors, fluid management to maintain blood pressure and secondary organ function    I personally reviewed:    Arterial and venous blood gases to assess acid base balance, oxygenation, and ventilator settings.      Hemodynamic parameters obtained by central hemodynamic monitoring including RAP, estimated LVEDP, pulmonary artery pressure, cardiac output and vascular resistances in order to adjust fluids and infused medications for blood pressure and cardiac output maintenance.    Volume status, renal function and nutritional support as judged by intake, output, daily weight and appropriate laboratories.    I reviewed  individual and serial imaging studies including echocardiogram, chest x-ray, CT scan    I personally supervised the prescription of parenteral fluids, inotropes, vasodilators , and vasopressors in order to correct cardiac output, maintain urine output and renal function.    I personall discussed the pateints condition with the patient or family designated decision maker in order to discuss current and on-going diagnostic and therapeutic options.    I personally provided care for this patient, reviewed chart, discussed course with patient, housestaff and consulting physicians.  I answered all questions.    Case discussed with pathology, echocardiography.  90 minutes critical care    Marvin Tinsley M.D.  Division of Cardiology  Department of Medicine

## 2017-04-25 ENCOUNTER — APPOINTMENT (OUTPATIENT)
Dept: GENERAL RADIOLOGY | Facility: CLINIC | Age: 52
DRG: 228 | End: 2017-04-25
Attending: INTERNAL MEDICINE
Payer: COMMERCIAL

## 2017-04-25 ENCOUNTER — APPOINTMENT (OUTPATIENT)
Dept: CT IMAGING | Facility: CLINIC | Age: 52
DRG: 228 | End: 2017-04-25
Payer: COMMERCIAL

## 2017-04-25 ENCOUNTER — ANESTHESIA (OUTPATIENT)
Dept: INTENSIVE CARE | Facility: CLINIC | Age: 52
DRG: 228 | End: 2017-04-25
Payer: COMMERCIAL

## 2017-04-25 ENCOUNTER — ANESTHESIA EVENT (OUTPATIENT)
Dept: INTENSIVE CARE | Facility: CLINIC | Age: 52
DRG: 228 | End: 2017-04-25
Payer: COMMERCIAL

## 2017-04-25 ENCOUNTER — APPOINTMENT (OUTPATIENT)
Dept: CARDIOLOGY | Facility: CLINIC | Age: 52
DRG: 228 | End: 2017-04-25
Payer: COMMERCIAL

## 2017-04-25 ENCOUNTER — APPOINTMENT (OUTPATIENT)
Dept: GENERAL RADIOLOGY | Facility: CLINIC | Age: 52
DRG: 228 | End: 2017-04-25
Payer: COMMERCIAL

## 2017-04-25 LAB
ALBUMIN SERPL-MCNC: 2 G/DL (ref 3.4–5)
ALP SERPL-CCNC: 69 U/L (ref 40–150)
ALT SERPL W P-5'-P-CCNC: 36 U/L (ref 0–70)
ANION GAP SERPL CALCULATED.3IONS-SCNC: 10 MMOL/L (ref 3–14)
ANION GAP SERPL CALCULATED.3IONS-SCNC: 7 MMOL/L (ref 3–14)
ANION GAP SERPL CALCULATED.3IONS-SCNC: 8 MMOL/L (ref 3–14)
AST SERPL W P-5'-P-CCNC: 22 U/L (ref 0–45)
BACTERIA SPEC CULT: NO GROWTH
BACTERIA SPEC CULT: NO GROWTH
BASE EXCESS BLDA CALC-SCNC: 3.3 MMOL/L
BASE EXCESS BLDA CALC-SCNC: 6.8 MMOL/L
BASE EXCESS BLDA CALC-SCNC: 9.2 MMOL/L
BASE EXCESS BLDV CALC-SCNC: 10 MMOL/L
BASE EXCESS BLDV CALC-SCNC: 6 MMOL/L
BASE EXCESS BLDV CALC-SCNC: 6.9 MMOL/L
BASE EXCESS BLDV CALC-SCNC: 7 MMOL/L
BASE EXCESS BLDV CALC-SCNC: 7.4 MMOL/L
BASE EXCESS BLDV CALC-SCNC: 7.4 MMOL/L
BASE EXCESS BLDV CALC-SCNC: 8 MMOL/L
BASE EXCESS BLDV CALC-SCNC: 8 MMOL/L
BASE EXCESS BLDV CALC-SCNC: 8.2 MMOL/L
BASE EXCESS BLDV CALC-SCNC: 9.3 MMOL/L
BILIRUB DIRECT SERPL-MCNC: 0.2 MG/DL (ref 0–0.2)
BILIRUB SERPL-MCNC: 0.4 MG/DL (ref 0.2–1.3)
BUN SERPL-MCNC: 20 MG/DL (ref 7–30)
BUN SERPL-MCNC: 21 MG/DL (ref 7–30)
BUN SERPL-MCNC: 24 MG/DL (ref 7–30)
CA-I BLD-MCNC: 4.4 MG/DL (ref 4.4–5.2)
CALCIUM SERPL-MCNC: 7.4 MG/DL (ref 8.5–10.1)
CALCIUM SERPL-MCNC: 7.6 MG/DL (ref 8.5–10.1)
CALCIUM SERPL-MCNC: 7.8 MG/DL (ref 8.5–10.1)
CHLORIDE SERPL-SCNC: 101 MMOL/L (ref 94–109)
CHLORIDE SERPL-SCNC: 98 MMOL/L (ref 94–109)
CHLORIDE SERPL-SCNC: 99 MMOL/L (ref 94–109)
CO2 SERPL-SCNC: 30 MMOL/L (ref 20–32)
CO2 SERPL-SCNC: 30 MMOL/L (ref 20–32)
CO2 SERPL-SCNC: 32 MMOL/L (ref 20–32)
CREAT SERPL-MCNC: 1.08 MG/DL (ref 0.66–1.25)
CREAT SERPL-MCNC: 1.14 MG/DL (ref 0.66–1.25)
CREAT SERPL-MCNC: 1.32 MG/DL (ref 0.66–1.25)
ERYTHROCYTE [DISTWIDTH] IN BLOOD BY AUTOMATED COUNT: 17.3 % (ref 10–15)
GFR SERPL CREATININE-BSD FRML MDRD: 57 ML/MIN/1.7M2
GFR SERPL CREATININE-BSD FRML MDRD: 68 ML/MIN/1.7M2
GFR SERPL CREATININE-BSD FRML MDRD: 72 ML/MIN/1.7M2
GLUCOSE BLD-MCNC: 287 MG/DL (ref 70–99)
GLUCOSE BLDC GLUCOMTR-MCNC: 157 MG/DL (ref 70–99)
GLUCOSE BLDC GLUCOMTR-MCNC: 207 MG/DL (ref 70–99)
GLUCOSE BLDC GLUCOMTR-MCNC: 274 MG/DL (ref 70–99)
GLUCOSE BLDC GLUCOMTR-MCNC: 282 MG/DL (ref 70–99)
GLUCOSE BLDC GLUCOMTR-MCNC: 310 MG/DL (ref 70–99)
GLUCOSE SERPL-MCNC: 212 MG/DL (ref 70–99)
GLUCOSE SERPL-MCNC: 251 MG/DL (ref 70–99)
GLUCOSE SERPL-MCNC: 260 MG/DL (ref 70–99)
HCO3 BLD-SCNC: 27 MMOL/L (ref 21–28)
HCO3 BLD-SCNC: 30 MMOL/L (ref 21–28)
HCO3 BLD-SCNC: 33 MMOL/L (ref 21–28)
HCO3 BLDV-SCNC: 31 MMOL/L (ref 21–28)
HCO3 BLDV-SCNC: 32 MMOL/L (ref 21–28)
HCO3 BLDV-SCNC: 33 MMOL/L (ref 21–28)
HCO3 BLDV-SCNC: 33 MMOL/L (ref 21–28)
HCO3 BLDV-SCNC: 34 MMOL/L (ref 21–28)
HCO3 BLDV-SCNC: 35 MMOL/L (ref 21–28)
HCT VFR BLD AUTO: 27.4 % (ref 40–53)
HGB BLD-MCNC: 8.2 G/DL (ref 13.3–17.7)
INTERPRETATION ECG - MUSE: NORMAL
LACTATE BLD-SCNC: 1.3 MMOL/L (ref 0.7–2.1)
LACTATE BLD-SCNC: 1.5 MMOL/L (ref 0.7–2.1)
LACTATE BLD-SCNC: 1.6 MMOL/L (ref 0.7–2.1)
LACTATE BLD-SCNC: 3.5 MMOL/L (ref 0.7–2.1)
LMWH PPP CHRO-ACNC: 0.38 IU/ML
LMWH PPP CHRO-ACNC: 0.6 IU/ML
LMWH PPP CHRO-ACNC: NORMAL IU/ML
MAGNESIUM SERPL-MCNC: 2.2 MG/DL (ref 1.6–2.3)
MAGNESIUM SERPL-MCNC: 2.2 MG/DL (ref 1.6–2.3)
MCH RBC QN AUTO: 29.9 PG (ref 26.5–33)
MCHC RBC AUTO-ENTMCNC: 29.9 G/DL (ref 31.5–36.5)
MCV RBC AUTO: 100 FL (ref 78–100)
MICRO REPORT STATUS: NORMAL
MICRO REPORT STATUS: NORMAL
O2/TOTAL GAS SETTING VFR VENT: 100 %
O2/TOTAL GAS SETTING VFR VENT: 45 %
O2/TOTAL GAS SETTING VFR VENT: 60 %
O2/TOTAL GAS SETTING VFR VENT: 70 %
O2/TOTAL GAS SETTING VFR VENT: ABNORMAL %
OXYHGB MFR BLD: 89 % (ref 92–100)
OXYHGB MFR BLD: 96 % (ref 92–100)
OXYHGB MFR BLDV: 35 %
OXYHGB MFR BLDV: 39 %
OXYHGB MFR BLDV: 44 %
OXYHGB MFR BLDV: 46 %
OXYHGB MFR BLDV: 46 %
OXYHGB MFR BLDV: 47 %
OXYHGB MFR BLDV: 54 %
OXYHGB MFR BLDV: 55 %
OXYHGB MFR BLDV: 58 %
OXYHGB MFR BLDV: 61 %
PCO2 BLD: 34 MM HG (ref 35–45)
PCO2 BLD: 36 MM HG (ref 35–45)
PCO2 BLD: 42 MM HG (ref 35–45)
PCO2 BLDV: 44 MM HG (ref 40–50)
PCO2 BLDV: 45 MM HG (ref 40–50)
PCO2 BLDV: 45 MM HG (ref 40–50)
PCO2 BLDV: 47 MM HG (ref 40–50)
PCO2 BLDV: 48 MM HG (ref 40–50)
PCO2 BLDV: 48 MM HG (ref 40–50)
PCO2 BLDV: 49 MM HG (ref 40–50)
PCO2 BLDV: 49 MM HG (ref 40–50)
PCO2 BLDV: 50 MM HG (ref 40–50)
PCO2 BLDV: 50 MM HG (ref 40–50)
PH BLD: 7.51 PH (ref 7.35–7.45)
PH BLD: 7.51 PH (ref 7.35–7.45)
PH BLD: 7.53 PH (ref 7.35–7.45)
PH BLDV: 7.42 PH (ref 7.32–7.43)
PH BLDV: 7.43 PH (ref 7.32–7.43)
PH BLDV: 7.43 PH (ref 7.32–7.43)
PH BLDV: 7.44 PH (ref 7.32–7.43)
PH BLDV: 7.44 PH (ref 7.32–7.43)
PH BLDV: 7.45 PH (ref 7.32–7.43)
PH BLDV: 7.45 PH (ref 7.32–7.43)
PH BLDV: 7.46 PH (ref 7.32–7.43)
PH BLDV: 7.46 PH (ref 7.32–7.43)
PH BLDV: 7.47 PH (ref 7.32–7.43)
PLATELET # BLD AUTO: 316 10E9/L (ref 150–450)
PO2 BLD: 56 MM HG (ref 80–105)
PO2 BLD: 57 MM HG (ref 80–105)
PO2 BLD: 95 MM HG (ref 80–105)
PO2 BLDV: 23 MM HG (ref 25–47)
PO2 BLDV: 26 MM HG (ref 25–47)
PO2 BLDV: 27 MM HG (ref 25–47)
PO2 BLDV: 28 MM HG (ref 25–47)
PO2 BLDV: 28 MM HG (ref 25–47)
PO2 BLDV: 29 MM HG (ref 25–47)
PO2 BLDV: 31 MM HG (ref 25–47)
PO2 BLDV: 32 MM HG (ref 25–47)
PO2 BLDV: 33 MM HG (ref 25–47)
PO2 BLDV: 36 MM HG (ref 25–47)
POTASSIUM BLD-SCNC: 3.5 MMOL/L (ref 3.4–5.3)
POTASSIUM SERPL-SCNC: 3.6 MMOL/L (ref 3.4–5.3)
POTASSIUM SERPL-SCNC: 3.9 MMOL/L (ref 3.4–5.3)
POTASSIUM SERPL-SCNC: 4.2 MMOL/L (ref 3.4–5.3)
PROT SERPL-MCNC: 7.2 G/DL (ref 6.8–8.8)
RBC # BLD AUTO: 2.74 10E12/L (ref 4.4–5.9)
SODIUM SERPL-SCNC: 136 MMOL/L (ref 133–144)
SODIUM SERPL-SCNC: 138 MMOL/L (ref 133–144)
SODIUM SERPL-SCNC: 141 MMOL/L (ref 133–144)
SPECIMEN SOURCE: NORMAL
SPECIMEN SOURCE: NORMAL
TROPONIN I SERPL-MCNC: 0.3 UG/L (ref 0–0.04)
TROPONIN I SERPL-MCNC: 0.32 UG/L (ref 0–0.04)
WBC # BLD AUTO: 13.1 10E9/L (ref 4–11)

## 2017-04-25 PROCEDURE — 71010 XR CHEST PORT 1 VW: CPT | Mod: 77

## 2017-04-25 PROCEDURE — 25000128 H RX IP 250 OP 636: Performed by: INTERNAL MEDICINE

## 2017-04-25 PROCEDURE — 82600 ASSAY OF CYANIDE: CPT | Performed by: INTERNAL MEDICINE

## 2017-04-25 PROCEDURE — 93005 ELECTROCARDIOGRAM TRACING: CPT

## 2017-04-25 PROCEDURE — 83605 ASSAY OF LACTIC ACID: CPT

## 2017-04-25 PROCEDURE — 93325 DOPPLER ECHO COLOR FLOW MAPG: CPT | Mod: 26 | Performed by: INTERNAL MEDICINE

## 2017-04-25 PROCEDURE — 25000132 ZZH RX MED GY IP 250 OP 250 PS 637: Performed by: INTERNAL MEDICINE

## 2017-04-25 PROCEDURE — 84484 ASSAY OF TROPONIN QUANT: CPT | Performed by: INTERNAL MEDICINE

## 2017-04-25 PROCEDURE — S0171 BUMETANIDE 0.5 MG: HCPCS | Performed by: STUDENT IN AN ORGANIZED HEALTH CARE EDUCATION/TRAINING PROGRAM

## 2017-04-25 PROCEDURE — 84132 ASSAY OF SERUM POTASSIUM: CPT | Performed by: INTERNAL MEDICINE

## 2017-04-25 PROCEDURE — 82805 BLOOD GASES W/O2 SATURATION: CPT | Performed by: STUDENT IN AN ORGANIZED HEALTH CARE EDUCATION/TRAINING PROGRAM

## 2017-04-25 PROCEDURE — 93010 ELECTROCARDIOGRAM REPORT: CPT | Mod: 76 | Performed by: INTERNAL MEDICINE

## 2017-04-25 PROCEDURE — 25000132 ZZH RX MED GY IP 250 OP 250 PS 637

## 2017-04-25 PROCEDURE — 25000125 ZZHC RX 250: Performed by: STUDENT IN AN ORGANIZED HEALTH CARE EDUCATION/TRAINING PROGRAM

## 2017-04-25 PROCEDURE — 25000132 ZZH RX MED GY IP 250 OP 250 PS 637: Performed by: STUDENT IN AN ORGANIZED HEALTH CARE EDUCATION/TRAINING PROGRAM

## 2017-04-25 PROCEDURE — 99291 CRITICAL CARE FIRST HOUR: CPT | Performed by: INTERNAL MEDICINE

## 2017-04-25 PROCEDURE — 25000125 ZZHC RX 250: Performed by: INTERNAL MEDICINE

## 2017-04-25 PROCEDURE — 82803 BLOOD GASES ANY COMBINATION: CPT

## 2017-04-25 PROCEDURE — 40000556 ZZH STATISTIC PERIPHERAL IV START W US GUIDANCE

## 2017-04-25 PROCEDURE — 40000983 ZZH STATISTIC HFNC ADULT NON-CPAP

## 2017-04-25 PROCEDURE — 82947 ASSAY GLUCOSE BLOOD QUANT: CPT | Performed by: STUDENT IN AN ORGANIZED HEALTH CARE EDUCATION/TRAINING PROGRAM

## 2017-04-25 PROCEDURE — 40000196 ZZH STATISTIC RAPCV CVP MONITORING

## 2017-04-25 PROCEDURE — 80048 BASIC METABOLIC PNL TOTAL CA: CPT | Performed by: INTERNAL MEDICINE

## 2017-04-25 PROCEDURE — 40000901 ZZH STATISTIC WOC PT EDUCATION, 0-15 MIN

## 2017-04-25 PROCEDURE — 25000125 ZZHC RX 250: Performed by: NURSE ANESTHETIST, CERTIFIED REGISTERED

## 2017-04-25 PROCEDURE — 36415 COLL VENOUS BLD VENIPUNCTURE: CPT

## 2017-04-25 PROCEDURE — 00000146 ZZHCL STATISTIC GLUCOSE BY METER IP

## 2017-04-25 PROCEDURE — 40000275 ZZH STATISTIC RCP TIME EA 10 MIN

## 2017-04-25 PROCEDURE — 82805 BLOOD GASES W/O2 SATURATION: CPT | Performed by: INTERNAL MEDICINE

## 2017-04-25 PROCEDURE — 87040 BLOOD CULTURE FOR BACTERIA: CPT

## 2017-04-25 PROCEDURE — 71250 CT THORAX DX C-: CPT

## 2017-04-25 PROCEDURE — 20000004 ZZH R&B ICU UMMC

## 2017-04-25 PROCEDURE — 80076 HEPATIC FUNCTION PANEL: CPT | Performed by: INTERNAL MEDICINE

## 2017-04-25 PROCEDURE — 93308 TTE F-UP OR LMTD: CPT | Mod: 26 | Performed by: INTERNAL MEDICINE

## 2017-04-25 PROCEDURE — 40000264 ECHO LIMITED WITH OPTISON

## 2017-04-25 PROCEDURE — 40000076 ZZH STATISTIC IABP MONITORING

## 2017-04-25 PROCEDURE — 40000671 ZZH STATISTIC ANESTHESIA CASE

## 2017-04-25 PROCEDURE — 84430 ASSAY OF THIOCYANATE: CPT | Performed by: INTERNAL MEDICINE

## 2017-04-25 PROCEDURE — 93308 TTE F-UP OR LMTD: CPT

## 2017-04-25 PROCEDURE — 85520 HEPARIN ASSAY: CPT | Performed by: INTERNAL MEDICINE

## 2017-04-25 PROCEDURE — 40000281 ZZH STATISTIC TRANSPORT TIME EA 15 MIN

## 2017-04-25 PROCEDURE — 40000986 XR CHEST PORT 1 VW

## 2017-04-25 PROCEDURE — 71010 XR CHEST PORT 1 VW: CPT

## 2017-04-25 PROCEDURE — 25000128 H RX IP 250 OP 636: Performed by: STUDENT IN AN ORGANIZED HEALTH CARE EDUCATION/TRAINING PROGRAM

## 2017-04-25 PROCEDURE — 93321 DOPPLER ECHO F-UP/LMTD STD: CPT | Mod: 26 | Performed by: INTERNAL MEDICINE

## 2017-04-25 PROCEDURE — 25000128 H RX IP 250 OP 636

## 2017-04-25 PROCEDURE — 83735 ASSAY OF MAGNESIUM: CPT | Performed by: INTERNAL MEDICINE

## 2017-04-25 PROCEDURE — 83605 ASSAY OF LACTIC ACID: CPT | Performed by: INTERNAL MEDICINE

## 2017-04-25 PROCEDURE — 40000014 ZZH STATISTIC ARTERIAL MONITORING DAILY

## 2017-04-25 PROCEDURE — 85027 COMPLETE CBC AUTOMATED: CPT | Performed by: INTERNAL MEDICINE

## 2017-04-25 PROCEDURE — 82330 ASSAY OF CALCIUM: CPT | Performed by: INTERNAL MEDICINE

## 2017-04-25 PROCEDURE — 25500064 ZZH RX 255 OP 636: Performed by: INTERNAL MEDICINE

## 2017-04-25 PROCEDURE — 5A1945Z RESPIRATORY VENTILATION, 24-96 CONSECUTIVE HOURS: ICD-10-PCS | Performed by: INTERNAL MEDICINE

## 2017-04-25 PROCEDURE — 83605 ASSAY OF LACTIC ACID: CPT | Performed by: STUDENT IN AN ORGANIZED HEALTH CARE EDUCATION/TRAINING PROGRAM

## 2017-04-25 PROCEDURE — 94002 VENT MGMT INPAT INIT DAY: CPT

## 2017-04-25 PROCEDURE — 99292 CRITICAL CARE ADDL 30 MIN: CPT | Performed by: INTERNAL MEDICINE

## 2017-04-25 RX ORDER — ISOSORBIDE DINITRATE 10 MG/1
10 TABLET ORAL EVERY 8 HOURS
Status: DISCONTINUED | OUTPATIENT
Start: 2017-04-25 | End: 2017-04-26

## 2017-04-25 RX ORDER — PROPOFOL 10 MG/ML
INJECTION, EMULSION INTRAVENOUS PRN
Status: DISCONTINUED | OUTPATIENT
Start: 2017-04-25 | End: 2017-04-25

## 2017-04-25 RX ORDER — HYDRALAZINE HYDROCHLORIDE 25 MG/1
25 TABLET, FILM COATED ORAL ONCE
Status: COMPLETED | OUTPATIENT
Start: 2017-04-25 | End: 2017-04-25

## 2017-04-25 RX ORDER — VANCOMYCIN HYDROCHLORIDE 1 G/200ML
1000 INJECTION, SOLUTION INTRAVENOUS ONCE
Status: DISCONTINUED | OUTPATIENT
Start: 2017-04-25 | End: 2017-04-25

## 2017-04-25 RX ORDER — ETOMIDATE 2 MG/ML
INJECTION INTRAVENOUS PRN
Status: DISCONTINUED | OUTPATIENT
Start: 2017-04-25 | End: 2017-04-25

## 2017-04-25 RX ADMIN — BUMETANIDE 2 MG: 0.25 INJECTION, SOLUTION INTRAMUSCULAR; INTRAVENOUS at 01:22

## 2017-04-25 RX ADMIN — ISOSORBIDE DINITRATE 10 MG: 10 TABLET ORAL at 07:54

## 2017-04-25 RX ADMIN — MIDAZOLAM 5 MG: 1 INJECTION INTRAMUSCULAR; INTRAVENOUS at 15:42

## 2017-04-25 RX ADMIN — Medication 7 ML: at 07:56

## 2017-04-25 RX ADMIN — HYDRALAZINE HYDROCHLORIDE 25 MG: 25 TABLET ORAL at 07:54

## 2017-04-25 RX ADMIN — PIPERACILLIN AND TAZOBACTAM 4.5 G: 4; .5 INJECTION, POWDER, FOR SOLUTION INTRAVENOUS at 21:11

## 2017-04-25 RX ADMIN — PIPERACILLIN AND TAZOBACTAM 4.5 G: 4; .5 INJECTION, POWDER, FOR SOLUTION INTRAVENOUS at 05:16

## 2017-04-25 RX ADMIN — ATORVASTATIN CALCIUM 40 MG: 40 TABLET, FILM COATED ORAL at 19:58

## 2017-04-25 RX ADMIN — MILRINONE LACTATE 0.25 MCG/KG/MIN: 200 INJECTION, SOLUTION INTRAVENOUS at 10:19

## 2017-04-25 RX ADMIN — ETOMIDATE 10 MG: 2 INJECTION INTRAVENOUS at 15:40

## 2017-04-25 RX ADMIN — Medication 7 ML: at 19:57

## 2017-04-25 RX ADMIN — Medication 2 MG/HR: at 03:08

## 2017-04-25 RX ADMIN — HYDROMORPHONE HYDROCHLORIDE 0.5 MG: 1 INJECTION, SOLUTION INTRAMUSCULAR; INTRAVENOUS; SUBCUTANEOUS at 15:04

## 2017-04-25 RX ADMIN — OXYCODONE HYDROCHLORIDE 5 MG: 5 TABLET ORAL at 11:57

## 2017-04-25 RX ADMIN — VANCOMYCIN HYDROCHLORIDE 1250 MG: 10 INJECTION, POWDER, LYOPHILIZED, FOR SOLUTION INTRAVENOUS at 20:10

## 2017-04-25 RX ADMIN — PIPERACILLIN AND TAZOBACTAM 4.5 G: 4; .5 INJECTION, POWDER, FOR SOLUTION INTRAVENOUS at 09:30

## 2017-04-25 RX ADMIN — Medication: at 10:17

## 2017-04-25 RX ADMIN — ASPIRIN 81 MG CHEWABLE TABLET 324 MG: 81 TABLET CHEWABLE at 07:53

## 2017-04-25 RX ADMIN — MIDAZOLAM HYDROCHLORIDE 2 MG/HR: 5 INJECTION, SOLUTION INTRAMUSCULAR; INTRAVENOUS at 15:44

## 2017-04-25 RX ADMIN — PIPERACILLIN AND TAZOBACTAM 4.5 G: 4; .5 INJECTION, POWDER, FOR SOLUTION INTRAVENOUS at 16:36

## 2017-04-25 RX ADMIN — PANTOPRAZOLE SODIUM 40 MG: 40 TABLET, DELAYED RELEASE ORAL at 07:55

## 2017-04-25 RX ADMIN — EPINEPHRINE 0.05 MCG/KG/MIN: 1 INJECTION PARENTERAL at 21:41

## 2017-04-25 RX ADMIN — SODIUM THIOSULFATE 2.5 MCG/KG/MIN: 250 INJECTION, SOLUTION INTRAVENOUS at 10:04

## 2017-04-25 RX ADMIN — HYDRALAZINE HYDROCHLORIDE 75 MG: 50 TABLET ORAL at 11:57

## 2017-04-25 RX ADMIN — PROPOFOL 30 MG: 10 INJECTION, EMULSION INTRAVENOUS at 15:43

## 2017-04-25 RX ADMIN — SODIUM THIOSULFATE 0.5 MCG/KG/MIN: 250 INJECTION, SOLUTION INTRAVENOUS at 04:59

## 2017-04-25 RX ADMIN — PANTOPRAZOLE SODIUM 40 MG: 40 TABLET, DELAYED RELEASE ORAL at 19:57

## 2017-04-25 RX ADMIN — SODIUM THIOSULFATE 2.5 MCG/KG/MIN: 250 INJECTION, SOLUTION INTRAVENOUS at 15:06

## 2017-04-25 RX ADMIN — VASOPRESSIN 2.4 UNITS/HR: 20 INJECTION, SOLUTION INTRAMUSCULAR; SUBCUTANEOUS at 20:59

## 2017-04-25 RX ADMIN — HUMAN ALBUMIN MICROSPHERES AND PERFLUTREN 5 ML: 10; .22 INJECTION, SOLUTION INTRAVENOUS at 17:30

## 2017-04-25 RX ADMIN — INSULIN GLARGINE 25 UNITS: 100 INJECTION, SOLUTION SUBCUTANEOUS at 07:57

## 2017-04-25 RX ADMIN — Medication 220 MG: at 07:55

## 2017-04-25 RX ADMIN — ETOMIDATE 10 MG: 2 INJECTION INTRAVENOUS at 15:39

## 2017-04-25 RX ADMIN — POTASSIUM CHLORIDE 20 MEQ: 1.5 POWDER, FOR SOLUTION ORAL at 06:29

## 2017-04-25 RX ADMIN — ACETAMINOPHEN 650 MG: 325 TABLET, FILM COATED ORAL at 18:13

## 2017-04-25 RX ADMIN — HYDRALAZINE HYDROCHLORIDE 50 MG: 50 TABLET ORAL at 05:16

## 2017-04-25 RX ADMIN — Medication 12.5 MG: at 21:09

## 2017-04-25 RX ADMIN — MULTIVIT AND MINERALS-FERROUS GLUCONATE 9 MG IRON/15 ML ORAL LIQUID 15 ML: at 07:53

## 2017-04-25 NOTE — PROGRESS NOTES
Patient intubated with 7.5 ETT placed at 23 cm. Vent settings per previous. Will continue to follow.

## 2017-04-25 NOTE — PLAN OF CARE
Problem: Goal Outcome Summary  Goal: Goal Outcome Summary  1) pt will be hemodynamically stable  2) pt will tolerate weaning of IABP  3) pain will be adequately controlled     SLP: Chart reviewed and consulted with RN. Patient not appropriate for swallow treatment this date. Will reschedule for 4-.

## 2017-04-25 NOTE — PHARMACY-VANCOMYCIN DOSING SERVICE
Pharmacy Vancomycin Initial Note  Date of Service 2017  Patient's  1965  51 year old, male    Indication: Aspiration Pneumonia    Current estimated CrCl = Estimated Creatinine Clearance: 58.2 mL/min (based on Cr of 1.32).    Creatinine for last 3 days  2017:  5:25 AM Creatinine 1.23 mg/dL;  4:27 PM Creatinine 1.18 mg/dL  2017:  4:14 AM Creatinine 1.12 mg/dL;  5:58 PM Creatinine 1.14 mg/dL  2017:  1:54 AM Creatinine 1.14 mg/dL; 12:44 PM Creatinine 1.08 mg/dL;  4:51 PM Creatinine 1.32 mg/dL    Recent Vancomycin Level(s) for last 3 days  2017:  5:25 AM Vancomycin Level 16.7 mg/L      Vancomycin IV Administrations (past 72 hours)                   vancomycin (VANCOCIN) 1,250 mg in NaCl 0.9 % 250 mL intermittent infusion (mg) 1,250 mg New Bag 17 1902     1,250 mg New Bag  0115     1,250 mg New Bag 17 0810                Nephrotoxins and other renal medications (Future)    Start     Dose/Rate Route Frequency Ordered Stop    17 1845  vancomycin (VANCOCIN) 1,250 mg in NaCl 0.9 % 250 mL intermittent infusion      1,250 mg  over 60 Minutes Intravenous EVERY 18 HOURS 17 1840      17 1830  vancomycin (VANCOCIN) 1000 mg in dextrose 5% 200 mL PREMIX      1,000 mg Intravenous ONCE 17 1815      17 0215  bumetanide (BUMEX) 0.25 mg/mL infusion      2 mg/hr  8 mL/hr  Intravenous CONTINUOUS 17 0200      17 2200  piperacillin-tazobactam (ZOSYN) 4.5 g vial to attach to  mL bag      4.5 g  over 1 Hours Intravenous EVERY 6 HOURS 17 2149            Contrast Orders - past 72 hours (72h ago through future)    Start     Dose/Rate Route Frequency Ordered Stop    17 1730  perflutren diluted 1mL to 2mL with saline (OPTISON) diluted injection 5 mL      5 mL Intravenous ONCE 17 1719 17 1730                Plan:  1.  Start vancomycin  1250 mg iv q18h   2.  Goal Trough Level: 15-20 mg/L   3.  Pharmacy will check trough levels as  appropriate in 1-3 Days.    4. Serum creatinine levels will be ordered daily for the first week of therapy and at least twice weekly for subsequent weeks.    5. [place] method utilized to dose vancomycin therapy: previous dosing/kinetics    Kailey Marcial

## 2017-04-25 NOTE — SIGNIFICANT EVENT
Overnight Cards Fellow Note    Pt with clinical signs of worsening pulmonary edema and rising LVEDP given escalating supplemental oxygen needs and dropping SvO2 to 30s. JVP to earlobes and CVP (off PICC) 14. This is in the setting of IABP wean over past 48 hrs and rising SVR as ?possible resolution of prior vasodilatory state. Lactate negative, Cr remains 1.1.    PLAN  - increased IABP from 1:3 to 1:2 (unable to use 1:1 due to sinus tach to 140)  - administered  bumex bolus and gtt, pt responding to diuresis    - SvO2 still 35 (CI 1.9) and SVR elevated.  - begin nipride to goal MAP 65  - continue milrinone 0.375    High risk for further decompensation, morbidity, mortality. Will follow closely    Gallo Sheffield  Cardiology Fellow  [phone]

## 2017-04-25 NOTE — PROGRESS NOTES
WOC here to see pt for follow up assessment coccyx wound.   RN states pt not sleeping recently, just fell asleep and req. Visit another time or day.    RN added pt is now on pulsate mattress and when pt is awake he moves so much they are unable to keep dressing on him.  Will change to previous plan of care.      Coccyx:     Coloplast Triad Barrier, apply 3 times daily, gently pat area clean and reapply triad as needed.  May need to cover with vaseline guaze to prevent paste from sticking to pads/linens.      WOC will return tomorrow or later this week.

## 2017-04-25 NOTE — PROGRESS NOTES
General Infectious Disease Service Progress Note       Patient:  Luke Henao   Date of birth 1965, Medical record number [id number]  Date of Visit:  04/25/2017  Date of Admission: 3/27/2017           Assessment and Recommendations:   RECOMMENDATIONS:    Continue Zosyn for now.     Repeat blood cx.      If fever continues to recur with no further improvement of respiratory status and CXR, we should consider bronchoscopy and BAL to right lung.     ASSESSMENT:  Luke Henao is a 51 year old [place] man with history of diabetes, now with cardiogenic shock from an inferior STEMI, s/p RCA aspiration thrombectomy and multiple stent placements.   He is now inotrope and IABP-dependent, and spiking intermittent fevers.     This patient continues to spike fever despite broad spectrum ABx.   No obvious source of infection is identified.   CXR is more c/w with edema but can not rule out HAP since prior to edema there was RUL infiltrate.     Zosyn and vancomycin started 4/19/2017. Since the patient is not known to be colonized with MRSA, vancomycin was DCed on 4/24/2017.     We should repeat Bcx.   We should consider bronchoscopy if no further improvement with bumex in this febrile patient who is also foreign born patient.   We should also consider thromboembolic phenomenon causing this intermittent fever with the recent U&LE doppler results.     Romi Li   Pager: [phone]  4/25/2017            Interim History:   Event of worsening hypoxia noted. Now on high flow O2.   Febrile.   Feels short of breath, tired.   Denied abdominal pain, no cough, no chest pain, no diarrhea, no N/V.     Exposure History  Originally from [place].   Also lived in the [facility].   [place] to [place] many [place] ago but visits [place] frequently, last visit was a year ago.   Lives in [place], [place]. Also lived in [place], [place] in the past.   No pets.   No known history of TB exposure, whether he has positive PPD is not clear.           History of Present Illness:   Luke Henao is a 51 year old [place] man with history of diabetes, who presented to [facility] via EMS on 3/28/17. He complained initially of a 2 day history of flu-like symptoms, with nausea and vomiting. On EMS arrival, he was noted to by hypotensive, and EKG was concerning for inferior STEMI. He went to the cath lab urgently, found to have severe LAD disease and LCx stenosis. He underwent aspiration thrombectomy and POBA of 2 sites, felt to need CABG, though thought to be a poor surgical candidate. While in the cath lab, was noted to be persistently hypotensive, and had an episode of hematemesis. He also developed complete heart block with bradycardia, and a temporary pacemaker and IABP were placed. He was started on dopamine and then transferred to [facility] for further management. On arrival, he underwent PCI with 7 stents placed. Since then, he has remained hypotensive and IABP-dependent, now on inotropes with replacement of a subclavian IABP.     The infectious disease team has been consulted for assistance in managing antimicrobials given intermittent fevers. Hospital course notable for fevers on 3/27 and 3/28 (with sputum culture growing Staph aureus and Haemophilus influenzae), thought secondary to possible aspiration pneumonia, and treated with vancomycin + zosyn, narrowed to Unasyn alone 3/30 through 4/3, then broadened again to vancomycin + Zosyn from 4/3 through 4/4), with Zosyn alone from 4/4 through 4/7. He was fever free and off all antibiotics from 4/7 through 4/15. Urine culture from 4/12 grew 10-50K colonies of E.coli. He spiked a low-grade fever on 4/15 and was started back on vancomycin + Zosyn, narrowed to ceftriaxone alone on 4/16 (to cover the E.coli UTI) through 4/19. Subclavian IABP was placed on 4/19, though he spiked a high fever to 102.2 and was broadened to vancomycin + zosyn again. PICC line replaced today, and Owings Benjy catheter  removed.    Today, the patient reports his biggest concern is pain in the RUE surrounding the PICC site. A new PICC has been placed in his LUE, though the old line remained in place until all IV access was secured. He denies nausea, vomiting, abdominal pain, constipation, diarrhea, headaches, SOB, chest pain or cough. No rashes or concerning lesions. He denies any dysuria, though urinary catheter remains in place.       Notable Microbiology:    Sputum (endotracheal) sample from 3/28 with Staph aureus and Haemophilus influenzae    Influenza A/B negative on 3/28    Sputum (endotracheal) sample from 3/29 with Staph aureus    C.diff PCR negative on 4/7    Urine culture from 4/12 with 10-50K colonies Escherichia coli (penicillin and fluoroquinolone-resistant)    Urine culture from 4/15 with >100K colonies E. Coli (penicillin and fluoroquinolone-resistant)    Multiple blood cultures this hospitalization remain NGTD           Review of Systems:   A full 10-point review of systems was obtained and is negative except for the mentioned in the HPI above.             Current Medications (antimicrobials listed in bold):       isosorbide dinitrate  10 mg Oral Q8H     hydrALAZINE  75 mg Oral Q6H     insulin aspart  1-12 Units Subcutaneous Q4H     insulin glargine  25 Units Subcutaneous QAM AC     aspirin  324 mg Oral or Feeding Tube Daily     heparin lock flush  5-10 mL Intracatheter Q24H     QUEtiapine  12.5 mg Oral At Bedtime     piperacillin-tazobactam  4.5 g Intravenous Q6H     pantoprazole  40 mg Per Feeding Tube BID     zinc sulfate (20 mg [place]. Zn/mL)  220 mg Per Feeding Tube Daily     vitamin A-D & C drops  7 mL Per Feeding Tube BID     atorvastatin  40 mg Oral or Feeding Tube Daily at 8 pm     pneumococcal vaccine  0.5 mL Intramuscular Once     sodium chloride (PF)  3 mL Intracatheter Q8H     multivitamins with minerals  15 mL Per Feeding Tube Daily            Allergies:   No Known Allergies         Physical Exam:    Ranges for vital signs:  Temp:  [98  F (36.7  C)-99.9  F (37.7  C)] 99.6  F (37.6  C)  Heart Rate:  [114-144] 137  Resp:  [18-28] 28  MAP:  [71 mmHg-105 mmHg] 79 mmHg  Arterial Line BP: ()/(43-76) 104/64  FiO2 (%):  [70 %-100 %] 70 %  SpO2:  [86 %-100 %] 95 %    Physical Examination:  GENERAL:  Alert, interactive, lying in bed with moderate respiratory distress.    HEENT:  Head is normocephalic, atraumatic.   EYES:  Eyes have anicteric sclerae without conjunctival injection or stigmata of endocarditis.    LUNGS:  Coarse BS bilaterally. With accessory muscle use.   CARDIOVASCULAR:  Tachycardic, regular rhythm.  ABDOMEN:  Absent bowel sounds, soft, non-tender. Distended. No appreciable hepatosplenomegaly.  : Chavis in place draining clear yellow urine.  SKIN:  No acute rashes. Site of new IABP in the right chest wall and the Site of old IABP in right groin are clear and dry, with no erythema or purulence noted. Lines are in place without any surrounding erythema or exudate.             Laboratory Data:     Inflammatory Markers    Recent Labs   Lab Test  04/08/17   0425   CRP  18.0*       Hematology Studies    Recent Labs   Lab Test  04/25/17   0154  04/24/17   0414  04/23/17   0525  04/22/17   1813  04/22/17   0350  04/21/17   1828   03/27/17   0551   03/26/17   2253  03/26/17   1800   WBC  13.1*  9.5  7.4  7.9  8.8  10.7   < >  15.9*   < >  18.6*  14.3*   ANEU   --    --    --    --    --    --    --   10.7*   --   15.4*  12.1*   AEOS   --    --    --    --    --    --    --   0.0   --   0.0  0.0   HGB  8.2*  7.6*  7.5*  7.8*  7.9*  8.1*   < >  13.1*   < >  13.6  15.7   MCV  100  99  100  99  99  98   < >  90   < >  91  90   PLT  316  278  268  237  206  189   < >  177   < >  196  204    < > = values in this interval not displayed.       Immune Globulin Studies    Recent Labs   Lab Test  04/18/17   2349   IGG  1610   IGM  63   IGA  394*       Metabolic Studies     Recent Labs   Lab Test  04/25/17   0557   04/25/17   0154  04/24/17   1758  04/24/17   1215  04/24/17   0414  04/23/17   1627   04/23/17   0525   NA   --   141  138   --   141  139   --   140   POTASSIUM  3.5  4.2  4.3  3.7  4.0  3.8   < >  4.0   CHLORIDE   --   101  102   --   102  101   --   100   CO2   --   30  30   --   31  33*   --   32   BUN   --   20  20   --   21  22   --   22   CR   --   1.14  1.14   --   1.12  1.18   --   1.23   GFRESTIMATED   --   68  68   --   69  65   --   62    < > = values in this interval not displayed.       Hepatic Studies    Recent Labs   Lab Test  04/25/17   0154  04/24/17   0414  04/23/17   0525  04/22/17   0350  04/21/17   0419  04/20/17   0345   BILITOTAL  0.4  0.6  1.0  0.5  0.5  0.4   ALKPHOS  69  62  68  63  63  57   ALBUMIN  2.0*  1.9*  2.0*  1.9*  2.3*  2.1*   AST  22  24  35  49*  21  27   ALT  36  38  49  49  38  43       Thyroid Studies    Recent Labs   Lab Test  04/14/17   1751  04/08/17   0425   TSH  2.54  1.33       Microbiology:  Culture Micro   Date Value Ref Range Status   04/23/2017 No growth after 2 days  Final   04/20/2017 No growth  Final   04/20/2017   Final    Canceled, Test credited Quantity not sufficient Notification of test cancellation   was given to ERIC ROSENBAUM ON UU4E, REORDERED FOR RN TO RECOLLECT     04/20/2017 No growth after 5 days  Final   04/19/2017 No growth  Final   04/19/2017 No growth  Final   04/19/2017 No growth  Final   04/15/2017 No growth  Final   04/15/2017 >100,000 colonies/mL Escherichia coli (A)  Final   04/15/2017 Canceled, Test credited Duplicate request  Final   04/12/2017 10,000 to 50,000 colonies/mL Escherichia coli (A)  Final   04/12/2017 No growth  Final   04/12/2017 No growth  Final   04/05/2017 No growth  Final   04/03/2017 No growth  Final   04/03/2017 No growth  Final   04/03/2017 No growth  Final   04/03/2017 No growth  Final   03/29/2017 (A)  Final    Moderate growth Staphylococcus aureus Susceptibility testing done on previous   specimen     03/29/2017 No growth   Final   03/28/2017 No growth  Final   03/28/2017 (A)  Final    Heavy growth Staphylococcus aureus  Heavy growth Haemophilus influenzae Beta lactamase negative Beta-lactamase   negative Haemophilus influenzae are usually susceptible to ampicillin,   amoxacillin/clavulanic acid, levofloxacin, and 3rd generation cephalosporins,   such as ceftriaxone.  Light growth Normal brianda     03/27/2017 No growth  Final   03/26/2017 No growth  Final   03/26/2017 No growth  Final       Urine Studies    Recent Labs   Lab Test  04/20/17   1711  04/19/17   2214  04/15/17   0135  04/12/17   1852  04/08/17   0705   LEUKEST  Large*  Large*  Large*  Moderate*  Negative   WBCU  73*  59*  50*  5*  1       Vancomycin Levels    Recent Labs   Lab Test  04/23/17   0525  04/21/17   1118  04/07/17   0403   VANCOMYCIN  16.7  20.6  14.1       Hepatitis B Testing   Recent Labs   Lab Test  04/15/17   0350   HBCAB  Reactive   A reactive result indicates acute, chronic or past/resolved hepatitis B   infection.  *   HEPBANG  Nonreactive     Hepatitis C Testing     Hepatitis C Antibody   Date Value Ref Range Status   04/15/2017  NR Final    Nonreactive   Assay performance characteristics have not been established for newborns,   infants, and children                Relevant Imaging:   CXR 4/24/2017 REVIEWED BY MYSELF  IMPRESSION:   1. Stable intra-aortic balloon pump marker near the aortic arch, just  above the level of the suzanne.   2. Increased patchy pulmonary opacities, right more than left.  Findings suggestive of worsening infection or pulmonary edema.    Doppler 4/23/2017 of bilateral UE and LE  IMPRESSION:  1. Small amount of peripheral nonocclusive thrombus in the left  internal jugular vein.  2. No evidence of deep venous thrombosis in the right upper  extremity.  IMPRESSION:  1. Small amount of chronic appearing, nonocclusive thrombus in the  left common femoral vein.  2. No DVT in the right lower extremity.      CT CHEST/ABDOMEN/PELVIS,  3/27/17:  IMPRESSION:   1. Small mediastinal hemorrhage, small bilateral pleural effusions  which may be hemorrhagic. Small intraperitoneal hemorrhage. Minimal  pericardial hemorrhage.  2. Minimal pneumoperitoneum in the left paracolic gutter, of unknown  significance. No pneumatosis as clinically questioned.  3. IABP slightly low in position.  4. Bilateral pulmonary dependent consolidations represent compression  atelectasis, however differentials include aspiration      CT ABDOMEN/PELVIS W/OUT CONTRAST, 4/4/17:  IMPRESSION:   1. No evidence of ischemic bowel, obstruction, or intra-abdominal  infection.  2. Bibasilar patchy pulmonary opacities likely represent combination  of aspiration and atelectasis.  3. Small amount of simple free fluid within the lower abdomen.  4. Small left retroperitoneal hematoma along the iliac vasculature  likely postprocedural.   5. Unchanged size of bilateral pleural effusions, which now consist of  simple fluid.    CT ABDOMEN PELVIS W/O CONTRAST, 4/11/2017 6:39 PM  COMPARISON: CT on 4/4/2017.  HISTORY: abdominal pain and maroon BMs.  Impression:  1. Stable small retroperitoneal hemorrhage adjacent to left iliac  vessels in the left low pelvis.   2. Slightly improved small bilateral pleural effusions and associated  bibasilar opacities, likely representing pulmonary edema.  3. Previously seen intraperitoneal fluid has resolved.  4. Intra-aortic balloon pump in the descending thoracic aorta,  superior tip out of view.

## 2017-04-25 NOTE — PLAN OF CARE
Problem: Goal Outcome Summary  Goal: Goal Outcome Summary  1) pt will be hemodynamically stable  2) pt will tolerate weaning of IABP  3) pain will be adequately controlled     OT 4E:  Cancel. Per RN/chart review pt. not medically appropriate for therapy this date; increase CVP, tachycardic.

## 2017-04-25 NOTE — PROGRESS NOTES
"[facility]  Cardiology II Service / Advanced Heart Failure        Interval History:   - Overnight became acutely tachypneac, required bumex infusion in the setting of rising CVP (up to 16) and worsening pulmonary edema on CXR, IABP support increased to 1:2  - CVP came down to 8, but he continued to worsen clinically. Became tachycardiac to 150, remained tachypneac and had worsening pO2  - Ended up intubated, with resultant improvement in tachycardia. After intubation however CXR showed a more pronounced Rt middle/lower lobe opacity and he spiked a fever.        Intake/Output Summary (Last 24 hours) at 04/25/17 1934  Last data filed at 04/25/17 1900   Gross per 24 hour   Intake          2958.29 ml   Output             3705 ml   Net          -746.71 ml           Pertinent Medications:  I have reviewed this patient's current medications      hydrALAZINE  75 mg Oral Q8H ANNITA     isosorbide dinitrate  10 mg Oral Q8H     insulin aspart  1-12 Units Subcutaneous Q4H     insulin glargine  25 Units Subcutaneous QAM AC     aspirin  324 mg Oral or Feeding Tube Daily     heparin lock flush  5-10 mL Intracatheter Q24H     QUEtiapine  12.5 mg Oral At Bedtime     piperacillin-tazobactam  4.5 g Intravenous Q6H     pantoprazole  40 mg Per Feeding Tube BID     zinc sulfate (20 mg [place]. Zn/mL)  220 mg Per Feeding Tube Daily     vitamin A-D & C drops  7 mL Per Feeding Tube BID     atorvastatin  40 mg Oral or Feeding Tube Daily at 8 pm     pneumococcal vaccine  0.5 mL Intramuscular Once     sodium chloride (PF)  3 mL Intracatheter Q8H     multivitamins with minerals  15 mL Per Feeding Tube Daily         Examination:  /74 (BP Location: Left arm)  Temp 99.6  F (37.6  C) (Oral)  Resp 18  Ht 1.575 m (5' 2.01\")  Wt 60.8 kg (134 lb 0.6 oz)  SpO2 92%  BMI 24.51 kg/m2  GEN: A, cooperative and conversational   NECK: can not assess JVP   RESP: crackles   CV: S1S2S3, holosystolic murmur at the " apex  ABD: Soft, nontender to palpation, no HSM, +BS, no guarding  EXT: No peripheral edema, warm/well perfused   NEURO: CN II-XII intact, normal 5/5 strength in all extremitites  SKIN: Normal skin turgor, no rash on limited exam    Data:  CMP  Recent Labs  Lab 04/25/17  0557 04/25/17  0154 04/24/17  1758 04/24/17  1228 04/24/17  1215 04/24/17  0414 04/23/17  1627  04/23/17  0525 04/22/17  1641  04/22/17  0350  04/21/17  0419   NA  --  141 138  --   --  141 139  --  140 140  --  142  < > 143   POTASSIUM 3.5 4.2 4.3  --  3.7 4.0 3.8  < > 4.0 4.0  < > 4.0  < > 3.6   CHLORIDE  --  101 102  --   --  102 101  --  100 103  --  104  < > 105   CO2  --  30 30  --   --  31 33*  --  32 30  --  31  < > 33*   ANIONGAP  --  10 6  --   --  9 5  --  7 7  --  8  < > 6   GLC  --  212* 248*  --   --  197* 192*  --  214* 243*  --  104*  < > 66*   BUN  --  20 20  --   --  21 22  --  22 22  --  22  < > 16   CR  --  1.14 1.14  --   --  1.12 1.18  --  1.23 1.14  --  1.24  < > 1.17   GFRESTIMATED  --  68 68  --   --  69 65  --  62 68  --  62  < > 66   GFRESTBLACK  --  82 82  --   --  84 79  --  75 82  --  75  < > 80   ROB  --  7.8* 7.8*  --   --  8.0* 7.9*  --  7.8* 7.6*  --  7.8*  < > 7.6*   MAG  --  2.2 2.2 2.2  --  2.2 2.2  --  2.2 2.1  --  2.2  < > 2.4*   PHOS  --   --   --  2.6  --   --   --   --   --  3.1  --  3.2  --  3.4   PROTTOTAL  --  7.2  --   --   --  6.5*  --   --  6.9  --   --  6.5*  --  7.0   ALBUMIN  --  2.0*  --   --   --  1.9*  --   --  2.0*  --   --  1.9*  --  2.3*   BILITOTAL  --  0.4  --   --   --  0.6  --   --  1.0  --   --  0.5  --  0.5   ALKPHOS  --  69  --   --   --  62  --   --  68  --   --  63  --  63   AST  --  22  --   --   --  24  --   --  35  --   --  49*  --  21   ALT  --  36  --   --   --  38  --   --  49  --   --  49  --  38   < > = values in this interval not displayed.  CBC    Recent Labs  Lab 04/25/17  0154 04/24/17  0414 04/23/17  0525 04/22/17  1813   WBC 13.1* 9.5 7.4 7.9   RBC 2.74* 2.59* 2.55*  2.63*   HGB 8.2* 7.6* 7.5* 7.8*   HCT 27.4* 25.7* 25.6* 26.1*    99 100 99   MCH 29.9 29.3 29.4 29.7   MCHC 29.9* 29.6* 29.3* 29.9*   RDW 17.3* 17.0* 16.9* 17.0*    278 268 237     INR    Recent Labs  Lab 04/20/17  0345 04/19/17  1159   INR 1.25* 1.23*     Arterial Blood Gas    Recent Labs  Lab 04/25/17  0722 04/25/17  0557 04/25/17  0420 04/25/17  0154  04/22/17  1157  04/21/17  0838  04/19/17  1603 04/19/17  1421   PH  --   --   --   --   --  7.46*  --  7.51*  --  7.49* 7.52*   PCO2  --   --   --   --   --  45  --  39  --  38 34*   PO2  --   --   --   --   --  112*  --  111*  --  125* 135*   HCO3  --   --   --   --   --  32*  --  31*  --  29* 28   O2PER 45L 100.0 6L 5L  < > 3L  3L  < > 3L  3L  < > 3l 40   < > = values in this interval not displayed.    Imaging Studies:  Echo 3/27  The visual ejection fraction is estimated at 30-35%.  There is extensive inferior, inferoseptal, and inferolateral wall akinesis.  Basal/mid lateral wall hypokinesis  There is moderate to severe septal hypokinesis.  Septal wall motion abnormality may reflect pacemaker activation.  There is a catheter/pacemaker lead seen in the right ventricle.  The right ventricle is mildly dilated.  Severely decreased right ventricular systolic function  There is no pericardial effusion.  The rhythm was paced.  Compared to the prior study, the LVEF appears somewhat decreased. Septal wall  motion abnormality larger- may reflect, in part, that patient in sinus tach on  prior study, and ventricular paced now. No hemodynamically significant  valvular abnormalities on 2D or color flow imaging     CT chest/abdomen 3/27   IMPRESSION:   1. Small mediastinal hemorrhage, small bilateral pleural effusions  which may be hemorrhagic. Small intraperitoneal hemorrhage. Minimal  pericardial hemorrhage.  2. Minimal pneumoperitoneum in the left paracolic gutter, of unknown  significance. No pneumatosis as clinically questioned.  3. IABP slightly low in  position.  4. Bilateral pulmonary dependent consolidations represent compression  atelectasis, however differentials include aspiration.     Cath 3/26 [facility]  SUMMARY:   --Inferior STEMI w/ late presentation. Culprit dictated by complete  heart block, and cardiogenic shock. Emergent echo in the Cath Lab also  shows significant RV dysfunction.  --Three vessel coronary artery disease with diffuse coronary disease  out to the distal vessels  --Successful POBA of the prox and mid-RCA. Stent was not placed, in  anticipation he may need to be considered for bypass surgery in the  near future  --Insertion of a temporary pacemaker for bradycardia (AVB) and  hypotension  --Insertion of a IABP  --Upper GI bleed consistent with [facility] 3/27     CT head 3/28: normal      Echo 3/29  Interpretation Summary  Limited study. Ischemic CM. Moderately (EF 30-35%) reduced left ventricular  function is present. Traced at 32%.  Posterior wall akinesis is present. Lateral wall akinesis is present. Inferior  wall akinesis is present.  Right ventricular function, chamber size, wall motion, and thickness are  normal.  Dilation of the inferior vena cava is present with abnormal respiratory  variation in diameter.     Compared to prior study, RV fxn is improved.     Echo 3/31  Left Ventricle  The Ejection Fraction is estimated at 50-55%. Inferior,posterior,lateral,basal  septal wall akinesis as reported before. No change.     CORONARY ANGIOGRAM 4/1:   1. Both coronary arteries arise from their respective cusps.  2. Right dominant.  3. LM has mild luminal irregularities.   4. LAD supplies the apex along with the RCA (type 2 LAD) and gives rise to septal perforators and a large and branching D1. There are widely patent stents extending from the proximal to mid LAD. The dLAD has mild to moderate disease to 30% stenosis. The D1 is jailed by the LAD stents, but has EBER 3 flow with disease to 30% stenosis.   5.  The small ramus is no longer present.  6. LCX is occluded at the ostium.   7. RCA gives rise to PL branches and supplies PDA. There are widely patent stents extending from the proximal to mid RCA. The remainder of the mRCA has disease to 50% stenosis and the dRCA has disease to 10% stenosis. The RPDA has a widely patent stent and the remainder of the artery has mild disease to 10% stenosis. The RPLA has small vessels with diffuse disease including a distal branch occlusion. The RPLA supplies some small collateral branches to the dLCx.      COMPLICATIONS:  1. None      SUMMARY:   1. Cardiogenic shock following an inferior STEMI.  2. The LCx re-occlusion is not surprising as the recently revascularized LCx  had poor outflow and the revascularized portion did not supply a large territory (limited to no flow was present distal to the stented segment immediately following stenting). Repeat revascularization of the LCx would result in the same outcome of repeat closure and also risk embolizing thrombus from the LCx into the LAD so no treatment of the occluded LCx was performed.   3. Three vessel coronary artery disease with patent LAD and RCA stents and an occluded LCx.     Echo 4/3  Left ventricular size is normal.  The Ejection Fraction is estimated at 35-40%.  Inferior wall akinesis is present.  Lateral wall akinesis is present.  Posterior wall akinesis is present.  Right ventricular function, chamber size, wall motion, and thickness are  normal.  Moderate mitral insufficiency is present.  Moderate tricuspid insufficiency is present.  Right ventricular systolic pressure is 47mmHg above the right atrial pressure.  The inferior vena cava is normal.     Echo 4/5  Moderately (EF 35-40%) reduced left ventricular function is present. Traced at  40%.  Moderate mitral insufficiency is present.  The cause of the mitral insufficiency appears to be restricted posterior  leaflet from posterior MI. No mitral leaflet pathology  seen.  Dilation of the inferior vena cava is present with abnormal respiratory  variation in diameter.     CT abd without contrast 4/4  IMPRESSION:   1. No evidence of ischemic bowel, obstruction, or intra-abdominal  infection.  2. Bibasilar patchy pulmonary opacities likely represent combination  of aspiration and atelectasis.  3. Small amount of simple free fluid within the lower abdomen.  4. Small left retroperitoneal hematoma along the iliac vasculature  likely postprocedural.   5. Unchanged size of bilateral pleural effusions, which now consist of  simple fluid.    Assessment and Plan  51 year old man presented with cardiogenic shock from inferior STEMI s/p RCA aspiration thrombectomy and POBA on 3/26 at [facility] s/p IABP and initiation of Dopamine, also during procedure, CHB s/p temp pacer, emesis/hematmesis and altered mental status s/p intubation transferred here for consideration of mechanical support on 3/27. Had PCI to RCA, LAD and LCx (on ASA and plavix) started on epinephrine in addition to dopamine, post procedure developed distributive shock picture from infection (aspiration pneumonia? Sputum cx growing staph aureus, on vanco and zosyn) vs post-MI SIRS response. Currently in cardiogenic shock with IABP in situ    Card  # Cardiogenic shock 4/3- from underlying 3v CAD-has had high SVR and low CI, complicated by ischemic MR  # Due to inferior wall STEMI. S/P 7 stents to LAD, circ, RCA (angiogram report pending). Now with IABP, temporary pacemaker after 3rd deg heart block in cath lab at [facility] on 3/26   # Small pericardial hemorrhage   # Remains unclear why we are having a hard time weaning him off of IABP in the setting of EF of 3-35%. Microvascular disease is a possibility.  - IABP in place  - Hold off on isordil for now  - Cortisol normal  - Continue Milrinone 0.25  - Plan to keep CVP 9-12  - Continue Hydralazine as tolerated (75 mg q6H)  - f/u blood cultures  - Closely monitor hemodynamics  (currently cardiogenic shock, but given recent fever and concern for aspiration pna, could potentially develop a septic picture overnight)  - Will likely need a MV intervention (MVR v MitraClip) when stable enough (given severe MR on echo, and his reliance on afterload reduction with Milrinone, Hydral and IABP)    # Neuro  - intubated, sedated         Respiratory  - Intubated in the setting of worsening hypoxia on 4/25  - Possible causes: aspiration PNA vs PE vs volume overload   - Diuresing to CVP < 10, already on heparin inf for IABP, and being treated with Vanc and Pip/tazo  - Pulmonary consulted, CT WO con ordered, and tentatively planning for bronch tomorrow         # ID  - UA 4/15 with cloudy urine, 50 WBCs, many bacteria, no nitrates. UCx grew E coli - treated with CFTX (sensitive)  - Fever on 4/20 and single spike 100.2 overnight with blood cultures in process (no growth thus far), PICC and SGC removed, started on Vanc and pip/tazo  - 4/25 new fever and concern for aspiration pneumonia, continuing Vanc/zosyn         # Endocrine  T2DM: HA1C 7.5 on admission.  - Insulin gtt per nurising protocol with hypoglycemia precautions.   - Switch to SQ Lantus 4/22      # GI  - Had several maroon BMs since 4/11, Hb overall dropped by a 1.5 grams or so, heparin inf was held and GI consulted  - Colonoscopy 4/17 showed a rectal ulcer but no intervenable lesions  - Will continue to monitor Hb, back on heparin inf  - Protonix 40 PO BID         # Renal/  - Daily Cr  - Avoid nephrotoxins as able  - Plan to keep CVP < 10     -Hypernatremia  - Resolved. Stopped his D5W, only getting FWF's.        FEN: feeding tube, and dysphagia level II  Code: FULL  PPx: PPI 40mg BID, senna PRN  Dispo: pending stability    LINES:  - SC IABP  4/19  - LUE PICC   4/20  - Chavis catheter  4/20        Plan of care discussed with Dr. Alvina Loving MD  Cardiovascular Disease Fellow  Pager: [phone]      45 minutes

## 2017-04-25 NOTE — PLAN OF CARE
Problem: Goal Outcome Summary  Goal: Goal Outcome Summary  1) pt will be hemodynamically stable  2) pt will tolerate weaning of IABP  3) pain will be adequately controlled     Patient appears to be A&O. T-max 99.9. Lungs crackles bilateral throughout. Increased WOB noted with increasing CVP, decreasing UOP. Tachycardic to the mid 140s. Cards 2 notified. 2mg IV bumex ordered. Bumex drip started at 2mg/hr and IABP changed to 1:2. -300/hr. Notify Cards if drops below 150/hr. SVR up to the 1900s. Nipride orders to titrate to a MAP of 65-70. 0600 hemodynamics reviewed with the team and no further changes needed.

## 2017-04-25 NOTE — PROGRESS NOTES
CLINICAL NUTRITION SERVICES - REASSESSMENT NOTE     Nutrition Prescription    RECOMMENDATIONS FOR MDs/PROVIDERS TO ORDER:  Diet adv per SLP discretion.     Malnutrition Status:    Non-severe malnutrition in the context of acute illness.    Recommendations already ordered by Registered Dietitian (RD):  1. Given inadequate PO intake per calorie count data/patient report and intermittent NPO status, rec continue Peptamen 1.5 @ goal 50 ml/hr (1200 ml/day) to provide 1800 kcal (32 kcal/kg), 82 g PRO (1.5 g/kg), 924 ml free H2O, 67 g Fat (70% from MCTs), 226 g CHO and no Fiber daily per dosing weight of 56 kg.     2. Rec continue Magic Cup @ 10am and 2pm, each provides 290 kcal and 9 g PRO.    Future/Additional Recommendations:  1. Once PO intake starts to improve, reorder calorie counts x3 days to monitor ongoing need/approp for continuous TF. If PO intake (per initial calorie count data) shows patient meeting at least 50% of estimated need (700 kcal and 35 g PRO), consider cycling TF (Peptamen 1.5) @ 50 ml/hr x 12 hrs (8pm-8am) to provide ~50% of above provisions.    2. Monitor for ability to tolerate thin liquids as patient had interest in trying Ensure Plus oral nutrition supplements.     EVALUATION OF THE PROGRESS TOWARD GOALS   Diet: Dysphagia level 2 diet with nectar thick liquids    Nutrition Support: Receiving TF (Peptamen 1.5) @ goal 50 ml/hr via nasoduodenal FT placed by RD with Cortrak on 3/29, secured with Bridle    Intake: 7 day TF/D5 ave provided 1439 kcal (26 kcal/kg) and 65 g PRO (1.2 g/kg) daily per dosing weight of 56 kg which meets 100% of estimated energy needs for maintenance and 89% of previously estimated protein needs, interruptions to goal TF volumes related to procedures, patient reports poor appetite and only able to eat a small amount of a time as not feeling good and tired/fatigued     Calorie counts:  4/21- 142 kcal, 2 g PRO  4/22- 338 kcal, 9 g PRO  4/23- No intake recorded  *2 day ave 240  kcal (4 kcal/kg), 6 g PRO (0.1 g/kg) daily per dosing weight of 56 kg which meets 17% estimated energy needs and < 1% of estimated protein needs     NEW FINDINGS   -CV: Subclavian IABP placed on 4/19.  -Resp: High flow nasal cannula.   -Weight: 60.8 kg, up from lowest weight of 56.2 kg on 4/11. On Bumex. See edema below.   -GI: +BM on 4/25 (small, formed, dark brown), having x1-5 BMs/day over the past ~week.   -Skin: John: 16, Nutrition John: 3 on 4/24. WOC nurse note on 4/20 indicates stage 2 pressure injury on coccyx, no change. Receiving daily multivitamin with minerals and started 10 day course of Tri-vi-sol 7 ml BID and zinc sulfate 220 mg/day on 4/18.     Updated ASSESSED NUTRITION NEEDS (dosing weight of 56 kg)  Estimated Protein Needs: 67-84+ grams protein/day (1.2- 1.5+ grams of pro/kg)  Justification: Wound healing    MALNUTRITION  % Intake: Decreased intake does not meet criteria  % Weight Loss: None noted over the past week  Subcutaneous Fat Loss: Upper arm and Lower arm: mild  Muscle Loss: Upper arm (bicep, tricep), Lower arm  (forearm), Upper leg (quadricep, hamstring), Patellar region and Posterior calf: moderate  Fluid Accumulation/Edema: Trace  Malnutrition Diagnosis: Non-severe malnutrition in the context of acute illness    Previous Goals   Total avg nutritional intake to meet a minimum of 25 kcal/kg and 1.3 g PRO/kg daily (per dosing wt 56 kg).  Evaluation: Partially met    Previous Nutrition Diagnosis  Inadequate protein-energy intake related to inadequate EN infusion and PO intake 2/2 frequent NPO status/procedures AEB TF only provided 50% lowest kcal and 38% lowest PRO needs on average over 7 days with documented minimal PO kcals/PRO (~110-170 kcal and 2-9 g PRO per day), now with stage 2 PI.   Evaluation: Improving, likely resolved (with TF + PO on ave)    CURRENT NUTRITION DIAGNOSIS  Predicted inadequate nutrient intake (protein-energy) related to mostly reliant on TF for nutrition given  inadequate PO intake per calorie count data with intermittent NPO status/holding TF for procedures.     INTERVENTIONS  Implementation  None today.    Goals  Total avg nutritional intake to meet a minimum of 25 kcal/kg and 1.2 g PRO/kg daily (per dosing wt 56 kg).    Monitoring/Evaluation  Progress toward goals will be monitored and evaluated per protocol.      Mariangel Salmon RD, LD (pager [phone])  RD will continue to follow

## 2017-04-25 NOTE — ANESTHESIA PROCEDURE NOTES
ANESTHESIOLOGY RESIDENT/CRNA INTUBATION NOTE  Indication for intubation: respiratory insufficiency, airway protection.  Provider Ordering Intubation: CORTES PALMER  History regarding the most recent potassium obtained: Yes  History regarding renal failure obtained: Yes  History of presence or absence of CVA/stroke was obtained: Yes  History of presence or absence of NM disorder obtained: Yes  Post Intubation:  ETT secured, Primary/ICU team to review CXR, No apparent complications, Vent settings by primary/ICU team, Sedation to be ordered by primary/ICU team and Report given to primary nurse and/or team  Called to ICU for intubation.  Pt on CPAP with increasing oxygen demands.

## 2017-04-25 NOTE — PLAN OF CARE
Problem: Goal Outcome Summary  Goal: Goal Outcome Summary  1) pt will be hemodynamically stable  2) pt will tolerate weaning of IABP  3) pain will be adequately controlled     PT 4E: Cancel - Per RN and chart review, pt not appropriate for therapies today d/t hemodynamic instability with increased O2 and IABP needs. Will cancel and reschedule as appropriate per POC.

## 2017-04-26 ENCOUNTER — APPOINTMENT (OUTPATIENT)
Dept: GENERAL RADIOLOGY | Facility: CLINIC | Age: 52
DRG: 228 | End: 2017-04-26
Attending: INTERNAL MEDICINE
Payer: COMMERCIAL

## 2017-04-26 LAB
ALBUMIN SERPL-MCNC: 1.8 G/DL (ref 3.4–5)
ALP SERPL-CCNC: 66 U/L (ref 40–150)
ALT SERPL W P-5'-P-CCNC: 24 U/L (ref 0–70)
ANION GAP SERPL CALCULATED.3IONS-SCNC: 14 MMOL/L (ref 3–14)
ANION GAP SERPL CALCULATED.3IONS-SCNC: 6 MMOL/L (ref 3–14)
ANISOCYTOSIS BLD QL SMEAR: ABNORMAL
APPEARANCE FLD: NORMAL
AST SERPL W P-5'-P-CCNC: 23 U/L (ref 0–45)
BACTERIA SPEC CULT: NO GROWTH
BASE EXCESS BLDA CALC-SCNC: 6.9 MMOL/L
BASE EXCESS BLDV CALC-SCNC: 5 MMOL/L
BASE EXCESS BLDV CALC-SCNC: 7.1 MMOL/L
BASE EXCESS BLDV CALC-SCNC: 7.4 MMOL/L
BASE EXCESS BLDV CALC-SCNC: 7.5 MMOL/L
BASE EXCESS BLDV CALC-SCNC: 7.5 MMOL/L
BASOPHILS # BLD AUTO: 0 10E9/L (ref 0–0.2)
BASOPHILS NFR BLD AUTO: 0 %
BASOPHILS NFR FLD MANUAL: 1 %
BILIRUB DIRECT SERPL-MCNC: 0.2 MG/DL (ref 0–0.2)
BILIRUB SERPL-MCNC: 0.5 MG/DL (ref 0.2–1.3)
BUN SERPL-MCNC: 27 MG/DL (ref 7–30)
BUN SERPL-MCNC: 31 MG/DL (ref 7–30)
C DIFF TOX B STL QL: NORMAL
CA-I BLD-MCNC: 4.3 MG/DL (ref 4.4–5.2)
CALCIUM SERPL-MCNC: 7.1 MG/DL (ref 8.5–10.1)
CALCIUM SERPL-MCNC: 7.5 MG/DL (ref 8.5–10.1)
CHLORIDE SERPL-SCNC: 101 MMOL/L (ref 94–109)
CHLORIDE SERPL-SCNC: 103 MMOL/L (ref 94–109)
CO2 SERPL-SCNC: 27 MMOL/L (ref 20–32)
CO2 SERPL-SCNC: 31 MMOL/L (ref 20–32)
COLOR FLD: NORMAL
COPATH REPORT: NORMAL
CREAT SERPL-MCNC: 1.36 MG/DL (ref 0.66–1.25)
CREAT SERPL-MCNC: 1.6 MG/DL (ref 0.66–1.25)
DIFFERENTIAL METHOD BLD: ABNORMAL
EOSINOPHIL # BLD AUTO: 0 10E9/L (ref 0–0.7)
EOSINOPHIL NFR BLD AUTO: 0 %
EOSINOPHIL NFR FLD MANUAL: 1 %
ERYTHROCYTE [DISTWIDTH] IN BLOOD BY AUTOMATED COUNT: 18.1 % (ref 10–15)
GFR SERPL CREATININE-BSD FRML MDRD: 46 ML/MIN/1.7M2
GFR SERPL CREATININE-BSD FRML MDRD: 56 ML/MIN/1.7M2
GLUCOSE BLDC GLUCOMTR-MCNC: 104 MG/DL (ref 70–99)
GLUCOSE BLDC GLUCOMTR-MCNC: 126 MG/DL (ref 70–99)
GLUCOSE BLDC GLUCOMTR-MCNC: 133 MG/DL (ref 70–99)
GLUCOSE BLDC GLUCOMTR-MCNC: 135 MG/DL (ref 70–99)
GLUCOSE BLDC GLUCOMTR-MCNC: 136 MG/DL (ref 70–99)
GLUCOSE BLDC GLUCOMTR-MCNC: 138 MG/DL (ref 70–99)
GLUCOSE BLDC GLUCOMTR-MCNC: 143 MG/DL (ref 70–99)
GLUCOSE BLDC GLUCOMTR-MCNC: 149 MG/DL (ref 70–99)
GLUCOSE BLDC GLUCOMTR-MCNC: 152 MG/DL (ref 70–99)
GLUCOSE BLDC GLUCOMTR-MCNC: 157 MG/DL (ref 70–99)
GLUCOSE BLDC GLUCOMTR-MCNC: 158 MG/DL (ref 70–99)
GLUCOSE BLDC GLUCOMTR-MCNC: 164 MG/DL (ref 70–99)
GLUCOSE BLDC GLUCOMTR-MCNC: 171 MG/DL (ref 70–99)
GLUCOSE BLDC GLUCOMTR-MCNC: 176 MG/DL (ref 70–99)
GLUCOSE BLDC GLUCOMTR-MCNC: 180 MG/DL (ref 70–99)
GLUCOSE BLDC GLUCOMTR-MCNC: 195 MG/DL (ref 70–99)
GLUCOSE BLDC GLUCOMTR-MCNC: 207 MG/DL (ref 70–99)
GLUCOSE BLDC GLUCOMTR-MCNC: 267 MG/DL (ref 70–99)
GLUCOSE BLDC GLUCOMTR-MCNC: 309 MG/DL (ref 70–99)
GLUCOSE SERPL-MCNC: 130 MG/DL (ref 70–99)
GLUCOSE SERPL-MCNC: 200 MG/DL (ref 70–99)
GRAM STN SPEC: NORMAL
GRAM STN SPEC: NORMAL
HCO3 BLD-SCNC: 31 MMOL/L (ref 21–28)
HCO3 BLDV-SCNC: 31 MMOL/L (ref 21–28)
HCO3 BLDV-SCNC: 33 MMOL/L (ref 21–28)
HCT VFR BLD AUTO: 26.2 % (ref 40–53)
HGB BLD-MCNC: 7.9 G/DL (ref 13.3–17.7)
INTERPRETATION ECG - MUSE: NORMAL
INTERPRETATION ECG - MUSE: NORMAL
KOH PREP SPEC: NORMAL
LACTATE BLD-SCNC: 0.9 MMOL/L (ref 0.7–2.1)
LACTATE BLD-SCNC: 0.9 MMOL/L (ref 0.7–2.1)
LACTATE BLD-SCNC: 1.8 MMOL/L (ref 0.7–2.1)
LMWH PPP CHRO-ACNC: 0.28 IU/ML
LYMPHOCYTES # BLD AUTO: 1.6 10E9/L (ref 0.8–5.3)
LYMPHOCYTES NFR BLD AUTO: 11 %
LYMPHOCYTES NFR FLD MANUAL: 6 %
Lab: NORMAL
MAGNESIUM SERPL-MCNC: 2.3 MG/DL (ref 1.6–2.3)
MAGNESIUM SERPL-MCNC: 2.4 MG/DL (ref 1.6–2.3)
MAGNESIUM SERPL-MCNC: 2.4 MG/DL (ref 1.6–2.3)
MCH RBC QN AUTO: 29.9 PG (ref 26.5–33)
MCHC RBC AUTO-ENTMCNC: 30.2 G/DL (ref 31.5–36.5)
MCV RBC AUTO: 99 FL (ref 78–100)
METAMYELOCYTES # BLD: 0.1 10E9/L
METAMYELOCYTES NFR BLD MANUAL: 1 %
MICRO REPORT STATUS: NORMAL
MONOCYTES # BLD AUTO: 0.9 10E9/L (ref 0–1.3)
MONOCYTES NFR BLD AUTO: 6 %
MONOS+MACROS NFR FLD MANUAL: 10 %
MYELOCYTES # BLD: 0.1 10E9/L
MYELOCYTES NFR BLD MANUAL: 1 %
NEUTROPHILS # BLD AUTO: 12.1 10E9/L (ref 1.6–8.3)
NEUTROPHILS NFR BLD AUTO: 81 %
NEUTS BAND NFR FLD MANUAL: 82 %
NRBC # BLD AUTO: 0.1 10*3/UL
NRBC BLD AUTO-RTO: 1 /100
O2/TOTAL GAS SETTING VFR VENT: 50 %
O2/TOTAL GAS SETTING VFR VENT: 60 %
OXYHGB MFR BLD: 98 % (ref 92–100)
OXYHGB MFR BLDV: 48 %
OXYHGB MFR BLDV: 50 %
OXYHGB MFR BLDV: 50 %
OXYHGB MFR BLDV: 53 %
OXYHGB MFR BLDV: 59 %
PCO2 BLD: 41 MM HG (ref 35–45)
PCO2 BLDV: 50 MM HG (ref 40–50)
PCO2 BLDV: 53 MM HG (ref 40–50)
PCO2 BLDV: 54 MM HG (ref 40–50)
PCO2 BLDV: 58 MM HG (ref 40–50)
PCO2 BLDV: 59 MM HG (ref 40–50)
PH BLD: 7.49 PH (ref 7.35–7.45)
PH BLDV: 7.33 PH (ref 7.32–7.43)
PH BLDV: 7.37 PH (ref 7.32–7.43)
PH BLDV: 7.39 PH (ref 7.32–7.43)
PH BLDV: 7.4 PH (ref 7.32–7.43)
PH BLDV: 7.42 PH (ref 7.32–7.43)
PHOSPHATE SERPL-MCNC: 2.1 MG/DL (ref 2.5–4.5)
PHOSPHATE SERPL-MCNC: 2.8 MG/DL (ref 2.5–4.5)
PLATELET # BLD AUTO: 317 10E9/L (ref 150–450)
PLATELET # BLD EST: ABNORMAL 10*3/UL
PO2 BLD: 168 MM HG (ref 80–105)
PO2 BLDV: 30 MM HG (ref 25–47)
PO2 BLDV: 31 MM HG (ref 25–47)
PO2 BLDV: 32 MM HG (ref 25–47)
PO2 BLDV: 34 MM HG (ref 25–47)
PO2 BLDV: 35 MM HG (ref 25–47)
POIKILOCYTOSIS BLD QL SMEAR: SLIGHT
POLYCHROMASIA BLD QL SMEAR: SLIGHT
POTASSIUM SERPL-SCNC: 3.4 MMOL/L (ref 3.4–5.3)
POTASSIUM SERPL-SCNC: 3.8 MMOL/L (ref 3.4–5.3)
POTASSIUM SERPL-SCNC: 4.5 MMOL/L (ref 3.4–5.3)
PROT SERPL-MCNC: 6.8 G/DL (ref 6.8–8.8)
RBC # BLD AUTO: 2.64 10E12/L (ref 4.4–5.9)
SODIUM SERPL-SCNC: 140 MMOL/L (ref 133–144)
SODIUM SERPL-SCNC: 142 MMOL/L (ref 133–144)
SPECIMEN SOURCE FLD: NORMAL
SPECIMEN SOURCE: NORMAL
TROPONIN I SERPL-MCNC: 0.27 UG/L (ref 0–0.04)
WBC # BLD AUTO: 14.9 10E9/L (ref 4–11)
WBC # FLD AUTO: 225 /UL

## 2017-04-26 PROCEDURE — 40000556 ZZH STATISTIC PERIPHERAL IV START W US GUIDANCE

## 2017-04-26 PROCEDURE — 84100 ASSAY OF PHOSPHORUS: CPT | Performed by: INTERNAL MEDICINE

## 2017-04-26 PROCEDURE — 87040 BLOOD CULTURE FOR BACTERIA: CPT | Performed by: INTERNAL MEDICINE

## 2017-04-26 PROCEDURE — 27210995 ZZH RX 272

## 2017-04-26 PROCEDURE — 80076 HEPATIC FUNCTION PANEL: CPT | Performed by: INTERNAL MEDICINE

## 2017-04-26 PROCEDURE — 87070 CULTURE OTHR SPECIMN AEROBIC: CPT | Performed by: INTERNAL MEDICINE

## 2017-04-26 PROCEDURE — 40000986 XR CHEST PORT 1 VW

## 2017-04-26 PROCEDURE — 85004 AUTOMATED DIFF WBC COUNT: CPT | Performed by: INTERNAL MEDICINE

## 2017-04-26 PROCEDURE — 88312 SPECIAL STAINS GROUP 1: CPT | Performed by: INTERNAL MEDICINE

## 2017-04-26 PROCEDURE — 25000132 ZZH RX MED GY IP 250 OP 250 PS 637: Performed by: INTERNAL MEDICINE

## 2017-04-26 PROCEDURE — 82805 BLOOD GASES W/O2 SATURATION: CPT

## 2017-04-26 PROCEDURE — 87116 MYCOBACTERIA CULTURE: CPT | Performed by: INTERNAL MEDICINE

## 2017-04-26 PROCEDURE — 87205 SMEAR GRAM STAIN: CPT | Performed by: INTERNAL MEDICINE

## 2017-04-26 PROCEDURE — 25000125 ZZHC RX 250

## 2017-04-26 PROCEDURE — 83605 ASSAY OF LACTIC ACID: CPT | Performed by: INTERNAL MEDICINE

## 2017-04-26 PROCEDURE — 94003 VENT MGMT INPAT SUBQ DAY: CPT

## 2017-04-26 PROCEDURE — 84484 ASSAY OF TROPONIN QUANT: CPT | Performed by: INTERNAL MEDICINE

## 2017-04-26 PROCEDURE — 83735 ASSAY OF MAGNESIUM: CPT | Performed by: INTERNAL MEDICINE

## 2017-04-26 PROCEDURE — 25000132 ZZH RX MED GY IP 250 OP 250 PS 637: Performed by: STUDENT IN AN ORGANIZED HEALTH CARE EDUCATION/TRAINING PROGRAM

## 2017-04-26 PROCEDURE — 25000128 H RX IP 250 OP 636: Performed by: STUDENT IN AN ORGANIZED HEALTH CARE EDUCATION/TRAINING PROGRAM

## 2017-04-26 PROCEDURE — 87206 SMEAR FLUORESCENT/ACID STAI: CPT | Performed by: INTERNAL MEDICINE

## 2017-04-26 PROCEDURE — 25000125 ZZHC RX 250: Performed by: INTERNAL MEDICINE

## 2017-04-26 PROCEDURE — 99291 CRITICAL CARE FIRST HOUR: CPT | Performed by: INTERNAL MEDICINE

## 2017-04-26 PROCEDURE — 40000986 XR ABDOMEN PORT F1 VW

## 2017-04-26 PROCEDURE — 89051 BODY FLUID CELL COUNT: CPT | Performed by: INTERNAL MEDICINE

## 2017-04-26 PROCEDURE — 31624 DX BRONCHOSCOPE/LAVAGE: CPT

## 2017-04-26 PROCEDURE — 87210 SMEAR WET MOUNT SALINE/INK: CPT | Performed by: INTERNAL MEDICINE

## 2017-04-26 PROCEDURE — 83605 ASSAY OF LACTIC ACID: CPT

## 2017-04-26 PROCEDURE — 25000125 ZZHC RX 250: Performed by: STUDENT IN AN ORGANIZED HEALTH CARE EDUCATION/TRAINING PROGRAM

## 2017-04-26 PROCEDURE — 85027 COMPLETE CBC AUTOMATED: CPT | Performed by: INTERNAL MEDICINE

## 2017-04-26 PROCEDURE — 00000146 ZZHCL STATISTIC GLUCOSE BY METER IP

## 2017-04-26 PROCEDURE — 87493 C DIFF AMPLIFIED PROBE: CPT | Performed by: STUDENT IN AN ORGANIZED HEALTH CARE EDUCATION/TRAINING PROGRAM

## 2017-04-26 PROCEDURE — 25000128 H RX IP 250 OP 636

## 2017-04-26 PROCEDURE — 25000128 H RX IP 250 OP 636: Performed by: INTERNAL MEDICINE

## 2017-04-26 PROCEDURE — 87106 FUNGI IDENTIFICATION YEAST: CPT | Performed by: INTERNAL MEDICINE

## 2017-04-26 PROCEDURE — 25000132 ZZH RX MED GY IP 250 OP 250 PS 637

## 2017-04-26 PROCEDURE — 80048 BASIC METABOLIC PNL TOTAL CA: CPT | Performed by: INTERNAL MEDICINE

## 2017-04-26 PROCEDURE — 40000275 ZZH STATISTIC RCP TIME EA 10 MIN

## 2017-04-26 PROCEDURE — 87633 RESP VIRUS 12-25 TARGETS: CPT | Performed by: INTERNAL MEDICINE

## 2017-04-26 PROCEDURE — 87015 SPECIMEN INFECT AGNT CONCNTJ: CPT | Performed by: INTERNAL MEDICINE

## 2017-04-26 PROCEDURE — 40000076 ZZH STATISTIC IABP MONITORING

## 2017-04-26 PROCEDURE — 85520 HEPARIN ASSAY: CPT | Performed by: INTERNAL MEDICINE

## 2017-04-26 PROCEDURE — 40000014 ZZH STATISTIC ARTERIAL MONITORING DAILY

## 2017-04-26 PROCEDURE — 87102 FUNGUS ISOLATION CULTURE: CPT | Performed by: INTERNAL MEDICINE

## 2017-04-26 PROCEDURE — 20000004 ZZH R&B ICU UMMC

## 2017-04-26 PROCEDURE — 82330 ASSAY OF CALCIUM: CPT | Performed by: INTERNAL MEDICINE

## 2017-04-26 PROCEDURE — 88108 CYTOPATH CONCENTRATE TECH: CPT | Performed by: INTERNAL MEDICINE

## 2017-04-26 PROCEDURE — 27210437 ZZH NUTRITION PRODUCT SEMIELEM INTERMED LITER

## 2017-04-26 PROCEDURE — 0B9D8ZX DRAINAGE OF RIGHT MIDDLE LUNG LOBE, VIA NATURAL OR ARTIFICIAL OPENING ENDOSCOPIC, DIAGNOSTIC: ICD-10-PCS | Performed by: INTERNAL MEDICINE

## 2017-04-26 PROCEDURE — S0171 BUMETANIDE 0.5 MG: HCPCS

## 2017-04-26 PROCEDURE — 84132 ASSAY OF SERUM POTASSIUM: CPT | Performed by: INTERNAL MEDICINE

## 2017-04-26 PROCEDURE — 99211 OFF/OP EST MAY X REQ PHY/QHP: CPT

## 2017-04-26 PROCEDURE — 82805 BLOOD GASES W/O2 SATURATION: CPT | Performed by: INTERNAL MEDICINE

## 2017-04-26 RX ORDER — LIDOCAINE HYDROCHLORIDE 10 MG/ML
INJECTION, SOLUTION EPIDURAL; INFILTRATION; INTRACAUDAL; PERINEURAL
Status: COMPLETED
Start: 2017-04-26 | End: 2017-04-26

## 2017-04-26 RX ORDER — MEROPENEM 1 G/1
1 INJECTION, POWDER, FOR SOLUTION INTRAVENOUS EVERY 8 HOURS
Status: COMPLETED | OUTPATIENT
Start: 2017-04-26 | End: 2017-05-02

## 2017-04-26 RX ORDER — HYDRALAZINE HYDROCHLORIDE 25 MG/1
25 TABLET, FILM COATED ORAL EVERY 6 HOURS
Status: DISCONTINUED | OUTPATIENT
Start: 2017-04-26 | End: 2017-04-26

## 2017-04-26 RX ORDER — DOPAMINE HYDROCHLORIDE 160 MG/100ML
5 INJECTION, SOLUTION INTRAVENOUS CONTINUOUS
Status: DISCONTINUED | OUTPATIENT
Start: 2017-04-26 | End: 2017-04-29

## 2017-04-26 RX ORDER — BUMETANIDE 0.25 MG/ML
2 INJECTION INTRAMUSCULAR; INTRAVENOUS ONCE
Status: COMPLETED | OUTPATIENT
Start: 2017-04-26 | End: 2017-04-26

## 2017-04-26 RX ADMIN — ATORVASTATIN CALCIUM 40 MG: 40 TABLET, FILM COATED ORAL at 20:00

## 2017-04-26 RX ADMIN — HYDRALAZINE HYDROCHLORIDE 25 MG: 25 TABLET ORAL at 15:04

## 2017-04-26 RX ADMIN — FENTANYL CITRATE 50 MCG/HR: 50 INJECTION, SOLUTION INTRAMUSCULAR; INTRAVENOUS at 16:26

## 2017-04-26 RX ADMIN — ACETAMINOPHEN 650 MG: 325 TABLET, FILM COATED ORAL at 05:17

## 2017-04-26 RX ADMIN — VANCOMYCIN HYDROCHLORIDE 1250 MG: 10 INJECTION, POWDER, LYOPHILIZED, FOR SOLUTION INTRAVENOUS at 12:50

## 2017-04-26 RX ADMIN — HUMAN INSULIN 4 UNITS/HR: 100 INJECTION, SOLUTION SUBCUTANEOUS at 05:18

## 2017-04-26 RX ADMIN — HUMAN INSULIN 2 UNITS/HR: 100 INJECTION, SOLUTION SUBCUTANEOUS at 14:28

## 2017-04-26 RX ADMIN — Medication 220 MG: at 08:20

## 2017-04-26 RX ADMIN — MULTIVIT AND MINERALS-FERROUS GLUCONATE 9 MG IRON/15 ML ORAL LIQUID 15 ML: at 08:18

## 2017-04-26 RX ADMIN — MIDAZOLAM HYDROCHLORIDE 2 MG/HR: 5 INJECTION, SOLUTION INTRAMUSCULAR; INTRAVENOUS at 21:45

## 2017-04-26 RX ADMIN — PANTOPRAZOLE SODIUM 40 MG: 40 TABLET, DELAYED RELEASE ORAL at 08:21

## 2017-04-26 RX ADMIN — VASOPRESSIN 2 UNITS/HR: 20 INJECTION, SOLUTION INTRAMUSCULAR; SUBCUTANEOUS at 16:37

## 2017-04-26 RX ADMIN — PIPERACILLIN AND TAZOBACTAM 4.5 G: 4; .5 INJECTION, POWDER, FOR SOLUTION INTRAVENOUS at 09:29

## 2017-04-26 RX ADMIN — LIDOCAINE HYDROCHLORIDE: 10 INJECTION, SOLUTION EPIDURAL; INFILTRATION; INTRACAUDAL; PERINEURAL at 18:19

## 2017-04-26 RX ADMIN — MEROPENEM 1 G: 1 INJECTION, POWDER, FOR SOLUTION INTRAVENOUS at 14:22

## 2017-04-26 RX ADMIN — MEROPENEM 1 G: 1 INJECTION, POWDER, FOR SOLUTION INTRAVENOUS at 20:01

## 2017-04-26 RX ADMIN — FENTANYL CITRATE 100 MCG/HR: 50 INJECTION, SOLUTION INTRAMUSCULAR; INTRAVENOUS at 03:44

## 2017-04-26 RX ADMIN — Medication 7 ML: at 08:20

## 2017-04-26 RX ADMIN — ASPIRIN 81 MG CHEWABLE TABLET 324 MG: 81 TABLET CHEWABLE at 08:18

## 2017-04-26 RX ADMIN — DOPAMINE HYDROCHLORIDE 5 MCG/KG/MIN: 160 INJECTION, SOLUTION INTRAVENOUS at 21:15

## 2017-04-26 RX ADMIN — PIPERACILLIN AND TAZOBACTAM 4.5 G: 4; .5 INJECTION, POWDER, FOR SOLUTION INTRAVENOUS at 03:44

## 2017-04-26 RX ADMIN — PANTOPRAZOLE SODIUM 40 MG: 40 TABLET, DELAYED RELEASE ORAL at 20:00

## 2017-04-26 RX ADMIN — ISOSORBIDE DINITRATE 10 MG: 10 TABLET ORAL at 15:04

## 2017-04-26 RX ADMIN — Medication 12.5 MG: at 21:44

## 2017-04-26 RX ADMIN — Medication 7 ML: at 20:00

## 2017-04-26 RX ADMIN — BUMETANIDE 2 MG: 0.25 INJECTION, SOLUTION INTRAMUSCULAR; INTRAVENOUS at 09:25

## 2017-04-26 RX ADMIN — HEPARIN SODIUM 550 UNITS/HR: 10000 INJECTION, SOLUTION INTRAVENOUS at 20:01

## 2017-04-26 RX ADMIN — POTASSIUM CHLORIDE 20 MEQ: 29.8 INJECTION, SOLUTION INTRAVENOUS at 17:31

## 2017-04-26 RX ADMIN — POTASSIUM CHLORIDE 20 MEQ: 1.5 POWDER, FOR SOLUTION ORAL at 05:48

## 2017-04-26 RX ADMIN — POTASSIUM PHOSPHATE, MONOBASIC AND POTASSIUM PHOSPHATE, DIBASIC 15 MMOL: 224; 236 INJECTION, SOLUTION INTRAVENOUS at 07:26

## 2017-04-26 RX ADMIN — ACETAMINOPHEN 650 MG: 325 TABLET, FILM COATED ORAL at 20:00

## 2017-04-26 RX ADMIN — ACETAMINOPHEN 650 MG: 325 TABLET, FILM COATED ORAL at 15:04

## 2017-04-26 RX ADMIN — HYDRALAZINE HYDROCHLORIDE 25 MG: 25 TABLET ORAL at 11:09

## 2017-04-26 RX ADMIN — HUMAN INSULIN 3.5 UNITS/HR: 100 INJECTION, SOLUTION SUBCUTANEOUS at 00:21

## 2017-04-26 NOTE — PROCEDURES
PROCEDURE:   Bronchoscopy with Bronchoalveolar lavage    INDICATION:  Acute Hypoxemic Respiratory Failure, Pneumonia    PROCEDURE :   Iban Gaviria    SUPERVISOR:  Dr Brooke    CONSENT:  Obtained and in the paper chart    UNIVERSAL PROTOCOL: Patient Identification was verified, time out was performed. Imaging data reviewed. Barrier precaution done: Hands washed, mask, gloves, gown, and eye protection all used.     MEDICATIONS: Patient had continuous versed and fentanyl drips running, we did not give any boluses.  Did get 3mL of 1% lidocaine down ET tube.    PROCEDURE: Patient monitored during entire procedure. 3mL of lidocaine instilled via ET tube with minimal cough.  Flexible bronchoscope was inserted through patients ET tube.  The ET tube was in good position.  The suzanne was sharp.  An airway inspection was done to the subsegmental level which found no mucosal ulcerations, mucous or debris.  The bronchoscope was then advanced to the RML and placed into wedge.  A BAL was performed where  120mL of saline were instilled in 60mL aliquots.  A total of 45mL were collected.     SAMPLES:   45 mL of  fluid were sent for   analysis.    Dr Brooke was present for the procedure.    COMPLICATIONS: None

## 2017-04-26 NOTE — PLAN OF CARE
Problem: Goal Outcome Summary  Goal: Goal Outcome Summary  1) pt will be hemodynamically stable  2) pt will tolerate weaning of IABP  3) pain will be adequately controlled     Outcome: No Change  D/I/A: Patient admitted post STEMI with IABP. Today patient is sedated on Versed and Fentanyl gtts. Sedatives titrated down but patient not following commands, moves all extremitites, PERRL intact. Tmax of 38.7, blood cultures sent, tylenol given with return to normothermia. Patient remains in sinus tach 105-130, MAPs initially in 80s but decreased to upper 50s after scheduled hydralazine and isordil given. Hydralazine and isordil d/trae, milrinone gtt stopped, and Vaso gtt started to improve MAPs. IABP continues at 1:2. Vent settings unchanged, lung sounds coarse, bronchoscopy performed, cultures sent. Tube feeds at goal rate, insulin gtt continues, patient is stooling with rectal tube in place, stool sample sent for C-diff rule out. UO adequate, 1x dose Bumex given. Replaced K+ of 3.8. Patient was up to chair x1. Family updated on plan of care.      P: Continue to improve hemodynamics and wean vent as tolerated.

## 2017-04-26 NOTE — PLAN OF CARE
Problem: Goal Outcome Summary  Goal: Goal Outcome Summary  1) pt will be hemodynamically stable  2) pt will tolerate weaning of IABP  3) pain will be adequately controlled     Neuro: Patient sedated on 4 mg/hr of versed, fentanyl at 100 mcg/hr. PERRL. DEL RIO. Does not follow commands.   Pulmonary: Lungs coarse throughout. Vent setting CMV 12, 450, 50%, 8. Small creamy, red sputum out. Culture sent.  Cardiac: ST, R/PVC. IABP 1:2, 100% augmentation. MAP 50-60 early in evening. Vaso and Epi titrated throughout the night to keep MAP >65. Both currently off. CI 2.5/2.1/2.2. SVR 1000-1400s. SvO2 47-55. CVP 12-14. Cards aware, no orders to diurese..  GI: TF at 50 mL/hr. FWF 30 mL/hr. Multiple loose BMs. Rectal tube placed. Insulin gtt started.   : Chavis in place. 40-50 mL/hr.  Skin: Coccyx dressing changed per plan of care.

## 2017-04-26 NOTE — PROGRESS NOTES
General Infectious Disease Service Progress Note       Patient:  Luke Henao   Date of birth 1965, Medical record number [id number]  Date of Visit:  04/26/2017  Date of Admission: 3/27/2017           Assessment and Recommendations:   RECOMMENDATIONS:    Consider substituting meropenem for Zosyn till results from BAL are available.      May continue vancomycin for now till BAL results are available.     Would add AFB smear and cx to BAL.        Check stool for C diff.     ASSESSMENT:  Luke Henao is a 51 year old [place] man with history of diabetes, with cardiogenic shock from an inferior STEMI, s/p RCA aspiration thrombectomy and multiple stent placements.   He is inotrope and IABP-dependent, and spiking intermittent fevers.     1. SIRS vs sepsis  2. Cardiogenic shock vs septic shock vs both   3. Diarrhea.     This patient continues to spike fever despite broad spectrum ABx of zosyn and vancomycin.   The pulmonary process is either edema or an infectious process.   Agree with bronchoscopy.   Would add AFB to BAL when sent given the patient's country of origin.     The chest CT is suggestive of pulmonary edema but can not rule out infection. It does not look like fungal or TB.     May still be in cardiogenic shock, whether he's also in septic shock is not clear, but definitely should be considered in a patient with SIRS.     Will follow on repeat Bcx.   Will follow on BAL.      Romi Li   Pager: [phone]  4/26/2017            Interim History:   Event of intubation 4/26/2017 noted.   Currently patient is sedated.   On milrinone, other vasopressors are on hold.   Also now with rectal tube.     Exposure History  Originally from [place].   Also lived in the [facility].   [place] to [place] ago but visits [place] frequently, last visit was a year ago.   Lives in [place], [place]. Also lived in [place], [place] in the past.   No pets.   No known history of TB exposure, whether he has  positive PPD is not clear.          History of Present Illness:   Luke Henao is a 51 year old Arabic man with history of diabetes, who presented to [facility] via EMS on 3/28/17. He complained initially of a 2 day history of flu-like symptoms, with nausea and vomiting. On EMS arrival, he was noted to by hypotensive, and EKG was concerning for inferior STEMI. He went to the cath lab urgently, found to have severe LAD disease and LCx stenosis. He underwent aspiration thrombectomy and POBA of 2 sites, felt to need CABG, though thought to be a poor surgical candidate. While in the cath lab, was noted to be persistently hypotensive, and had an episode of hematemesis. He also developed complete heart block with bradycardia, and a temporary pacemaker and IABP were placed. He was started on dopamine and then transferred to [facility] for further management. On arrival, he underwent PCI with 7 stents placed. Since then, he has remained hypotensive and IABP-dependent, now on inotropes with replacement of a subclavian IABP.     The infectious disease team has been consulted for assistance in managing antimicrobials given intermittent fevers. Hospital course notable for fevers on 3/27 and 3/28 (with sputum culture growing Staph aureus and Haemophilus influenzae), thought secondary to possible aspiration pneumonia, and treated with vancomycin + zosyn, narrowed to Unasyn alone 3/30 through 4/3, then broadened again to vancomycin + Zosyn from 4/3 through 4/4), with Zosyn alone from 4/4 through 4/7. He was fever free and off all antibiotics from 4/7 through 4/15. Urine culture from 4/12 grew 10-50K colonies of E.coli. He spiked a low-grade fever on 4/15 and was started back on vancomycin + Zosyn, narrowed to ceftriaxone alone on 4/16 (to cover the E.coli UTI) through 4/19. Subclavian IABP was placed on 4/19, though he spiked a high fever to 102.2 and was broadened to vancomycin + zosyn again. PICC line replaced today, and  [place] Benjy catheter removed.    Today, the patient reports his biggest concern is pain in the RUE surrounding the PICC site. A new PICC has been placed in his LUE, though the old line remained in place until all IV access was secured. He denies nausea, vomiting, abdominal pain, constipation, diarrhea, headaches, SOB, chest pain or cough. No rashes or concerning lesions. He denies any dysuria, though urinary catheter remains in place.       Notable Microbiology:    Sputum (endotracheal) sample from 3/28 with Staph aureus and Haemophilus influenzae    Influenza A/B negative on 3/28    Sputum (endotracheal) sample from 3/29 with Staph aureus    C.diff PCR negative on 4/7    Urine culture from 4/12 with 10-50K colonies Escherichia coli (penicillin and fluoroquinolone-resistant)    Urine culture from 4/15 with >100K colonies E. Coli (penicillin and fluoroquinolone-resistant)    Multiple blood cultures this hospitalization remain NGTD           Review of Systems:   A full 10-point review of systems was obtained and is negative except for the mentioned in the HPI above.             Current Medications (antimicrobials listed in bold):       hydrALAZINE  25 mg Oral Q6H     isosorbide dinitrate  10 mg Oral Q8H     vancomycin (VANCOCIN) IV  1,250 mg Intravenous Q18H     aspirin  324 mg Oral or Feeding Tube Daily     heparin lock flush  5-10 mL Intracatheter Q24H     QUEtiapine  12.5 mg Oral At Bedtime     piperacillin-tazobactam  4.5 g Intravenous Q6H     pantoprazole  40 mg Per Feeding Tube BID     zinc sulfate (20 mg Native. Zn/mL)  220 mg Per Feeding Tube Daily     vitamin A-D & C drops  7 mL Per Feeding Tube BID     atorvastatin  40 mg Oral or Feeding Tube Daily at 8 pm     pneumococcal vaccine  0.5 mL Intramuscular Once     sodium chloride (PF)  3 mL Intracatheter Q8H     multivitamins with minerals  15 mL Per Feeding Tube Daily            Allergies:   No Known Allergies         Physical Exam:   Ranges for vital  signs:  Temp:  [97.7  F (36.5  C)-102.8  F (39.3  C)] 97.7  F (36.5  C)  Heart Rate:  [106-144] 108  Resp:  [12-36] 15  MAP:  [57 mmHg-86 mmHg] 71 mmHg  Arterial Line BP: ()/(48-72) 92/60  FiO2 (%):  [50 %-100 %] 50 %  SpO2:  [94 %-100 %] 100 %    Physical Examination:  GENERAL:  Sedated, intubated.     HEENT:  Head is normocephalic, atraumatic.    LUNGS:  Coarse BS bilaterally.   CARDIOVASCULAR:  Tachycardic, regular rhythm.  ABDOMEN:  Absent bowel sounds, soft, non-tender. Distended. No appreciable hepatosplenomegaly.  : Chavis in place draining clear yellow urine.  SKIN:  No acute rashes. Site of new IABP in the right chest wall and the Site of old IABP in right groin are clear and dry, with no erythema or purulence noted. Lines are in place without any surrounding erythema or exudate.             Laboratory Data:     Inflammatory Markers    Recent Labs   Lab Test  04/08/17   0425   CRP  18.0*       Hematology Studies    Recent Labs   Lab Test  04/26/17   0400  04/25/17   0154  04/24/17   0414  04/23/17   0525  04/22/17   1813  04/22/17   0350   03/27/17   0551   03/26/17   2253  03/26/17   1800   WBC  14.9*  13.1*  9.5  7.4  7.9  8.8   < >  15.9*   < >  18.6*  14.3*   ANEU   --    --    --    --    --    --    --   10.7*   --   15.4*  12.1*   AEOS   --    --    --    --    --    --    --   0.0   --   0.0  0.0   HGB  7.9*  8.2*  7.6*  7.5*  7.8*  7.9*   < >  13.1*   < >  13.6  15.7   MCV  99  100  99  100  99  99   < >  90   < >  91  90   PLT  317  316  278  268  237  206   < >  177   < >  196  204    < > = values in this interval not displayed.       Immune Globulin Studies    Recent Labs   Lab Test  04/18/17   2349   IGG  1610   IGM  63   IGA  394*       Metabolic Studies     Recent Labs   Lab Test  04/26/17   0400  04/25/17   1651  04/25/17   1244  04/25/17   0557  04/25/17   0154  04/24/17   1758   NA  142  138  136   --   141  138   POTASSIUM  3.4  3.9  3.6  3.5  4.2  4.3   CHLORIDE  101  98  99   --    101  102   CO2  27  32  30   --   30  30   BUN  27  24  21   --   20  20   CR  1.36*  1.32*  1.08   --   1.14  1.14   GFRESTIMATED  56*  57*  72   --   68  68       Hepatic Studies    Recent Labs   Lab Test  04/26/17   0400  04/25/17   0154  04/24/17   0414  04/23/17   0525  04/22/17   0350  04/21/17   0419   BILITOTAL  0.5  0.4  0.6  1.0  0.5  0.5   ALKPHOS  66  69  62  68  63  63   ALBUMIN  1.8*  2.0*  1.9*  2.0*  1.9*  2.3*   AST  23  22  24  35  49*  21   ALT  24  36  38  49  49  38       Thyroid Studies    Recent Labs   Lab Test  04/14/17   1751  04/08/17   0425   TSH  2.54  1.33       Microbiology:  Culture Micro   Date Value Ref Range Status   04/25/2017 No growth after 9 hours  Final   04/25/2017 No growth after 9 hours  Final   04/25/2017 No growth after 8 hours  Final   04/23/2017 No growth after 3 days  Final   04/20/2017 No growth  Final   04/20/2017   Final    Canceled, Test credited Quantity not sufficient Notification of test cancellation   was given to ERIC ROSENBAUM ON UU4E, REORDERED FOR RN TO RECOLLECT     04/20/2017 No growth  Final   04/19/2017 No growth  Final   04/19/2017 No growth  Final   04/19/2017 No growth  Final   04/15/2017 No growth  Final   04/15/2017 >100,000 colonies/mL Escherichia coli (A)  Final   04/15/2017 Canceled, Test credited Duplicate request  Final   04/12/2017 10,000 to 50,000 colonies/mL Escherichia coli (A)  Final   04/12/2017 No growth  Final   04/12/2017 No growth  Final   04/05/2017 No growth  Final   04/03/2017 No growth  Final   04/03/2017 No growth  Final   04/03/2017 No growth  Final   04/03/2017 No growth  Final   03/29/2017 (A)  Final    Moderate growth Staphylococcus aureus Susceptibility testing done on previous   specimen     03/29/2017 No growth  Final   03/28/2017 No growth  Final   03/28/2017 (A)  Final    Heavy growth Staphylococcus aureus  Heavy growth Haemophilus influenzae Beta lactamase negative Beta-lactamase   negative Haemophilus influenzae are usually  susceptible to ampicillin,   amoxacillin/clavulanic acid, levofloxacin, and 3rd generation cephalosporins,   such as ceftriaxone.  Light growth Normal brianda     03/27/2017 No growth  Final   03/26/2017 No growth  Final   03/26/2017 No growth  Final       Urine Studies    Recent Labs   Lab Test  04/20/17   1711  04/19/17   2214  04/15/17   0135  04/12/17   1852  04/08/17   0705   LEUKEST  Large*  Large*  Large*  Moderate*  Negative   WBCU  73*  59*  50*  5*  1       Vancomycin Levels    Recent Labs   Lab Test  04/23/17   0525  04/21/17   1118  04/07/17   0403   VANCOMYCIN  16.7  20.6  14.1       Hepatitis B Testing   Recent Labs   Lab Test  04/15/17   0350   HBCAB  Reactive   A reactive result indicates acute, chronic or past/resolved hepatitis B   infection.  *   HEPBANG  Nonreactive     Hepatitis C Testing     Hepatitis C Antibody   Date Value Ref Range Status   04/15/2017  NR Final    Nonreactive   Assay performance characteristics have not been established for newborns,   infants, and children                Relevant Imaging:   CXR 4/24/2017 REVIEWED BY MYSELF  IMPRESSION:   1. Stable intra-aortic balloon pump marker near the aortic arch, just  above the level of the suzanne.   2. Increased patchy pulmonary opacities, right more than left.  Findings suggestive of worsening infection or pulmonary edema.    Doppler 4/23/2017 of bilateral UE and LE  IMPRESSION:  1. Small amount of peripheral nonocclusive thrombus in the left  internal jugular vein.  2. No evidence of deep venous thrombosis in the right upper  extremity.  IMPRESSION:  1. Small amount of chronic appearing, nonocclusive thrombus in the  left common femoral vein.  2. No DVT in the right lower extremity.      CT chest 4/26/2017 reviewed by myself   IMPRESSION:   Peribronchovascular opacities mixed with some septal lines in the mid  and upper lobes with bilateral pleural effusions and lower lobe  groundglass, overall most consistent with pulmonary edema.  Overlying  infection or hemorrhage cannot be ruled out.    CT CHEST/ABDOMEN/PELVIS, 3/27/17:  IMPRESSION:   1. Small mediastinal hemorrhage, small bilateral pleural effusions  which may be hemorrhagic. Small intraperitoneal hemorrhage. Minimal  pericardial hemorrhage.  2. Minimal pneumoperitoneum in the left paracolic gutter, of unknown  significance. No pneumatosis as clinically questioned.  3. IABP slightly low in position.  4. Bilateral pulmonary dependent consolidations represent compression  atelectasis, however differentials include aspiration      CT ABDOMEN/PELVIS W/OUT CONTRAST, 4/4/17:  IMPRESSION:   1. No evidence of ischemic bowel, obstruction, or intra-abdominal  infection.  2. Bibasilar patchy pulmonary opacities likely represent combination  of aspiration and atelectasis.  3. Small amount of simple free fluid within the lower abdomen.  4. Small left retroperitoneal hematoma along the iliac vasculature  likely postprocedural.   5. Unchanged size of bilateral pleural effusions, which now consist of  simple fluid.    CT ABDOMEN PELVIS W/O CONTRAST, 4/11/2017 6:39 PM  COMPARISON: CT on 4/4/2017.  HISTORY: abdominal pain and maroon BMs.  Impression:  1. Stable small retroperitoneal hemorrhage adjacent to left iliac  vessels in the left low pelvis.   2. Slightly improved small bilateral pleural effusions and associated  bibasilar opacities, likely representing pulmonary edema.  3. Previously seen intraperitoneal fluid has resolved.  4. Intra-aortic balloon pump in the descending thoracic aorta,  superior tip out of view.

## 2017-04-26 NOTE — PROGRESS NOTES
Social Work Services Progress Note    Hospital Day: 31  Date of Initial Social Work Evaluation:  3/28/17  Collaborated with:  Team rounds, pt's dtr Camtu    Data:    Pt is now reintubated with possible new pneumonia.    Intervention:    Met with pt's dtr Camtu.  Provided her with complete original FMLA and short-term disability paperwork for pt's records and explained that they were faxed directly on Monday.  Offered support especially in light of reintubation.      Assessment:   Camtu is staying a a nearby [facility] and plans to return to [place] for a few days this weekend after her youngest brother arrives from [place].  She continues to cope appropriately but appears to be more tired or overwhelmed than usual.  Pt now has had a lengthy stay.    Plan:    Anticipated Disposition:  Facility:  likely will need TCU.    Barriers to d/c plan:  Medical stability    Follow Up:  SW continues to follow for support to pt and family, resource referral, and d/c planning.    LUIS Villafana, [facility]  Adult ICU Clinical   Pager [phone]      ADDENDUM:  Received call from pt's dtr Camtu stating pt's employer still hasn't received the fax.  Re-faxed to both [phone] and [phone].  Received confirmations for each fax.

## 2017-04-26 NOTE — PROGRESS NOTES
[facility]  WO Nurse Inpatient Adult Pressure INJURY (PI) Wound Assessment     Follow up assessment of PU(s) on pt's:   Coccyx    Data:   Patient History:      per MD note(s): 51 year old man presented with cardiogenic shock from inferior STEMI s/p RCA aspiration thrombectomy and POBA on 3/26 at [facility] s/p IABP and initiation of Dopamine, also during procedure, CHB s/p temp pacer, emesis/hematmesis and altered mental status s/p intubation transferred here for consideration of mechanical support on 3/27. Had PCI to RCA, LAD and LCx (on ASA and plavix) started on epinephrine in addition to dopamine, post procedure developed distributive shock picture from infection (aspiration pneumonia? Sputum cx growing staph aureus, on vanco and zosyn) vs post-MI SIRS response. Currently in cardiogenic shock with IABP in situ      Current mattress:  Isolibrium  Current pressure relieving devices:  Heel lift boots and Pillows    Moisture Management:  Incontinence Protocol, Rectal Tube and Urinary Catheter    Catheter secured? Yes    Current Diet / Nutrition:       Active Diet Order      Dysphagia Diet Level 2 [facility] Altered Nectar Thickened Liquids (pre-thickened or use instant food thickener)    Tube Feeding:     John Assessment and sub scores:   John Score  Av.1  Min: 12  Max: 18   Recent Labs   Lab Test  17   0400   17   0345   17   0425   ALBUMIN  1.8*   < >  2.1*   < >  2.4*   HGB  7.9*   < >  7.9*   < >  9.5*   INR   --    --   1.25*   < >  1.19*   WBC  14.9*   < >  9.7   < >  16.7*   A1C   --    --    --    --   7.1*   CRP   --    --    --    --   18.0*    < > = values in this interval not displayed.                                                                                                        Pressure Injury Assessment  (location #1):   Coccyx    Wound History:   Stage 2 pressure injury situated more toward right buttock    Photo, joseyx 17      Wound Base: red dermis no  surrounding erythema , moist    Specific Dimensions (length x width x depth, in cm) :   1.8 x 1 x < 0.1 cm    Palpation of the wound bed:  normal    Slough appearance:  none    Periwound Skin: intact,      Color: normal and consistent with surrounding tissue    Temperature  normal     Drainage:    Amount: scant,  Color: serous       Odor: none    Pain: patient intubated         Intervention:     Patient's chart evaluated.      John Interventions:  Current John Interventions and Care Plan reviewed and updated, appropriate at this time.    Wound was assessed.    Wound Care: was done:     Visual inspection    Cleansing with NS solution    Application of Coloplast Triad Paste    Orders  Reviewed    Supplies  N/A    Discussed plan of care with Nurse           Assessment:     Pressure Injury (PI) located on Coccyx: II    Status: wound no significant change, Symptomatic             Plan:     Nursing to notify the Provider(s) and re-consult the WOC Nurse if wound(s) deteriorate(s).    Plan of care for wound located on Coccyx:  Coloplast Triad Barrier, apply 3 times daily, gently pat area clean    Discontinue dressing because it does not stay    WOC Nurse will return: Mondays and Thursday  Face to face time: 15 minutes

## 2017-04-26 NOTE — PROGRESS NOTES
Critical Care ICU Note - Cardiology  Marvin Tinsley M.D.    Impression:    Cardiogenic shock  Acute and chronic congestive heart failure  Acute respiratory failure  Acute renal failure  Sepsis  Mitral regurgitation  ? Aspiration    The patient became vasodilated and hyhpotensive with CXR demonstrating new infiltrate.    The patient remains unstable in the ICU with on-going need for ventilator support, parenteral medications for the adjustment of blood pressure and cardiac output and maintenance of renal function.      The patient is seen for prolonged ventilator management requiring oxygen and/or pressure modulation; shock requiring vasopressor and or inotropic agents; low cardiac output necessitating  inotropic agents, vasopressors and afterload reducing agents; limited mechanical support requiring IABP; acute renal failure requiring fluid and diuretic management; sepsis requiring antibiotic selection and monitoring, vasopressors, fluid management to maintain blood pressure and secondary organ function    I personally reviewed:    Arterial and venous blood gases to assess acid base balance, oxygenation, and ventilator settings.      Hemodynamic parameters obtained by central hemodynamic monitoring including RAP, estimated LVEDP, pulmonary artery pressure, cardiac output and vascular resistances in order to adjust fluids and infused medications for blood pressure and cardiac output maintenance.    Ventilatory settings and/or supplement oxygen needs in order to obtain optimal oxygenation and electrolyte balance at low possible pressure support and inspired oxygen tension    Volume status, renal function and nutritional support as judged by intake, output, daily weight and appropriate laboratories.    I reviewed individual and serial imaging studies including echocardiogram, chest x-ray, CT scan    I personally supervised the prescription of parenteral fluids, inotropes, vasodilators , and vasopressors in order to  correct cardiac output, maintain urine output and renal function.    90 minutes    Vasopressin, epinephrine added, milrinone eliminated, IABP retimed and adjusted

## 2017-04-26 NOTE — PLAN OF CARE
Problem: Goal Outcome Summary  Goal: Goal Outcome Summary  1) pt will be hemodynamically stable  2) pt will tolerate weaning of IABP  3) pain will be adequately controlled     ST 4E: Cx- Pt intubated yesterday afternoon. Will sign off and await new orders as appropriate.     Speech Language Therapy Discharge Summary     Reason for therapy discharge:    Change in medical status.     Progress towards therapy goal(s). See goals on Care Plan in [facility] electronic health record for goal details.  Goals not met.  Barriers to achieving goals:   limited tolerance for therapy and change in medical status.     Therapy recommendation(s):    Please reconsult as appropriate post extubation  Pt on dysphagia diet level 2 with nectar-thick liquids prior to intubation

## 2017-04-26 NOTE — CONSULTS
"[facility]    Pulmonary, Allergy, Critical Care and Sleep Medicine    Initial Consultation  April 26, 2017      Luke Henao MRN# [id number]   Age: 51 year old YOB: 1965     Date of Admission: 3/27/2017  Reason for Consultation: Pneumonia  Requesting Team: Cardiology    Primary care provider: Carline Ref-Primary, Physician     Assessment and Plan:  1. HCAP: Patient has new infiltrate that worsened on film after intubation while pulmonary edema appeared to improve.  He continues to spike fevers despite broad spectrum antibiotics.  Though not noted in the chart likely had aspiration in time preceding/during intubation with distribution of infiltrate.  Will proceed with bronch to assist with obtaining culture for narrowing antibiotics.  Likely also has component of continued volume overload and may benefit from further diuretics.  -will bronch this am  -agree with current antibiotic regimen  -agree with current lung protective ventilator setting  -diuretics per primary team    Seen and discussed with Dr Mendy Gaviria  Pulmonary/Critical Care Fellow    CC:  \"None\"  HPI: Patient is a 50 y/o male who has had a prolonged hospital stay requiring multiple different balloon pumps for his heart failure post MI.  He continues to be managed by cardiology team optimizing his heart performance, however started to develop fevers on 4/19, was started on broad spectrum antibiotics with slight improvement, when his vancomycin was reduced for 2 days spiked fevers again.  We are asked to perform bronchoscopy for sample in case resistant or untreated organisms were present.  Patient is intubated and sedated.  Wife was interviewed with  and says has had increasing dyspnea over the past 5-6 days, and though had a cough was never really productive.  Patient has no know history of lung disease in the past.  He was a smoker prior to this hospitalization.    Past Medical History:  CAD    Past " Surgical History:  Past Surgical History:   Procedure Laterality Date     COLONOSCOPY N/A 4/17/2017    Procedure: COLONOSCOPY;  Surgeon: Rashaad Bundy MD;  Location: [facility] GI     INSERT INTRAAORTIC BALLOON PUMP Right 4/19/2017    Procedure: INSERT INTRAAORTIC BALLOON PUMP;  Right Subclavian Intra Aortic Balloon Pump Insertion using [place]quet 40cc Ballon Catheter, Implentation of 8mm Gelweave Woven Vascular Prosthesis, Removal of Left Femoral Ballon Pump Catheter, Flouroscopy;  Surgeon: Keshav Leung MD;  Location: [facility] OR       Past Social History:  Social History     Social History     Marital status:      Spouse name: N/A     Number of children: N/A     Years of education: N/A     Occupational History     Not on file.     Social History Main Topics     Smoking status: Current Every Day Smoker     Packs/day: 0.50     Types: Cigarettes     Smokeless tobacco: Not on file     Alcohol use Not on file     Drug use: Not on file     Sexual activity: Not on file     Other Topics Concern     Not on file     Social History Narrative     Family History:   Per patients wife he has no family members with lung disease.  Does not know anyone with TB history.    Medications:    meropenem  1 g Intravenous Q8H     vancomycin (VANCOCIN) IV  1,250 mg Intravenous Q18H     aspirin  324 mg Oral or Feeding Tube Daily     heparin lock flush  5-10 mL Intracatheter Q24H     QUEtiapine  12.5 mg Oral At Bedtime     pantoprazole  40 mg Per Feeding Tube BID     zinc sulfate (20 mg [place]. Zn/mL)  220 mg Per Feeding Tube Daily     vitamin A-D & C drops  7 mL Per Feeding Tube BID     atorvastatin  40 mg Oral or Feeding Tube Daily at 8 pm     pneumococcal vaccine  0.5 mL Intramuscular Once     sodium chloride (PF)  3 mL Intracatheter Q8H     multivitamins with minerals  15 mL Per Feeding Tube Daily     Review of Systems:  Complete 12 point ROS negative unless mentioned in HPI    Physical Exam:  Temp:  [97.7  F (36.5  C)-101.7  F  (38.7  C)] 99.9  F (37.7  C)  Heart Rate:  [106-130] 111  Resp:  [12-16] 12  BP: (77)/(54) 77/54  MAP:  [57 mmHg-95 mmHg] 64 mmHg  Arterial Line BP: ()/(48-72) 80/54  FiO2 (%):  [50 %-70 %] 50 %  SpO2:  [100 %] 100 %    Intake/Output Summary (Last 24 hours) at 04/26/17 1852  Last data filed at 04/26/17 1800   Gross per 24 hour   Intake          3085.11 ml   Output             2029 ml   Net          1056.11 ml       General: laying in bed intubated and sedated, not responding  HEENT: ET tube in place  Neck: no palpable lymphadenopathy, JVD hard to assess given habitus  Chest: Crackles throughout lung fields, Subclavian balloon pump  Cardiac: Tachycardic but regular rhythm, no murmurs  Abdomen: Soft, obese, non-tender, active BS  Extremities: 2+LE Edema  Neuro: Sedated and not responding  Skin: no rash noted    Data:  All laboratory and imaging data reviewed.    CMP  Recent Labs  Lab 04/26/17  1607 04/26/17  0400 04/25/17  2337 04/25/17  1651 04/25/17  1244  04/25/17  0154  04/24/17  1228  04/24/17  0414  04/23/17  0525 04/22/17  1641  04/22/17  0350    142  --  138 136  --  141  < >  --   --  141  < > 140 140  --  142   POTASSIUM 3.8 3.4  --  3.9 3.6  < > 4.2  < >  --   < > 4.0  < > 4.0 4.0  < > 4.0   CHLORIDE 103 101  --  98 99  --  101  < >  --   --  102  < > 100 103  --  104   CO2 31 27  --  32 30  --  30  < >  --   --  31  < > 32 30  --  31   ANIONGAP 6 14  --  8 7  --  10  < >  --   --  9  < > 7 7  --  8   * 200* 287* 260* 251*  --  212*  < >  --   --  197*  < > 214* 243*  --  104*   BUN 31* 27  --  24 21  --  20  < >  --   --  21  < > 22 22  --  22   CR 1.60* 1.36*  --  1.32* 1.08  --  1.14  < >  --   --  1.12  < > 1.23 1.14  --  1.24   GFRESTIMATED 46* 56*  --  57* 72  --  68  < >  --   --  69  < > 62 68  --  62   GFRESTBLACK 56* 67  --  70 88  --  82  < >  --   --  84  < > 75 82  --  75   ROB 7.1* 7.5*  --  7.6* 7.4*  --  7.8*  < >  --   --  8.0*  < > 7.8* 7.6*  --  7.8*   MAG 2.3 2.4*  --   2.2  --   --  2.2  < > 2.2  --  2.2  < > 2.2 2.1  --  2.2   PHOS  --  2.1*  --   --   --   --   --   --  2.6  --   --   --   --  3.1  --  3.2   PROTTOTAL  --  6.8  --   --   --   --  7.2  --   --   --  6.5*  --  6.9  --   --  6.5*   ALBUMIN  --  1.8*  --   --   --   --  2.0*  --   --   --  1.9*  --  2.0*  --   --  1.9*   BILITOTAL  --  0.5  --   --   --   --  0.4  --   --   --  0.6  --  1.0  --   --  0.5   ALKPHOS  --  66  --   --   --   --  69  --   --   --  62  --  68  --   --  63   AST  --  23  --   --   --   --  22  --   --   --  24  --  35  --   --  49*   ALT  --  24  --   --   --   --  36  --   --   --  38  --  49  --   --  49   < > = values in this interval not displayed.  CBC  Recent Labs  Lab 04/26/17  0400 04/25/17  0154 04/24/17  0414 04/23/17  0525   WBC 14.9* 13.1* 9.5 7.4   RBC 2.64* 2.74* 2.59* 2.55*   HGB 7.9* 8.2* 7.6* 7.5*   HCT 26.2* 27.4* 25.7* 25.6*   MCV 99 100 99 100   MCH 29.9 29.9 29.3 29.4   MCHC 30.2* 29.9* 29.6* 29.3*   RDW 18.1* 17.3* 17.0* 16.9*    316 278 268     INR  Recent Labs  Lab 04/20/17  0345   INR 1.25*     Arterial Blood Gas  Recent Labs  Lab 04/26/17  1602 04/26/17  1224 04/26/17  0755 04/26/17  0400 04/25/17  2337  04/25/17  1434 04/25/17  1235 04/25/17  1134   PH  --   --   --   --  7.49*  --  7.53* 7.51* 7.51*   PCO2  --   --   --   --  41  --  36 42 34*   PO2  --   --   --   --  168*  --  57* 95 56*   HCO3  --   --   --   --  31*  --  30* 33* 27   O2PER 50 50 50 50 60  60  < > 45 100 70  70   < > = values in this interval not displayed.  Urine Studies  Recent Labs   Lab Test  04/20/17   1711  04/19/17   2214  04/15/17   0135  04/12/17   1852  04/08/17   0705   URINEPH  5.0  5.5  5.0  8.5*  5.0   NITRITE  Negative  Negative  Negative  Negative  Negative   LEUKEST  Large*  Large*  Large*  Moderate*  Negative   WBCU  73*  59*  50*  5*  1    Sputum Cultures in the last 3 months:  Specimen Description   Date Value Ref Range Status   04/26/2017 Blood [facility]  Final    04/26/2017 Blood Left Hand  Final   04/26/2017 Bronchial  Final   04/26/2017 Bronchial  Final    Culture Micro   Date Value Ref Range Status   04/26/2017 Pending  Incomplete   04/26/2017 Pending  Incomplete   04/26/2017 Culture negative < 24 hours, reincubate  Final

## 2017-04-26 NOTE — PLAN OF CARE
Problem: Goal Outcome Summary  Goal: Goal Outcome Summary  1) pt will be hemodynamically stable  2) pt will tolerate weaning of IABP  3) pain will be adequately controlled     OT 4E Cx Pt newly intubated, procedure planned this am.

## 2017-04-26 NOTE — PROGRESS NOTES
"[facility]  Cardiology II Service / Advanced Heart Failure        Interval History:   - Yesterday afternoon spiked a Temp of 102. ?aspiration pna  - Overnight had to be briefly started on Epi and Vaso (turned off now). Milrinone decreased to 0.125, IABP remains 1:2, CI remains borderline (2 - 2.5)  - Hydralazine had to be held overnight for low BP        Intake/Output Summary (Last 24 hours) at 04/26/17 0931  Last data filed at 04/26/17 0800   Gross per 24 hour   Intake          2792.02 ml   Output             2169 ml   Net           623.02 ml           Pertinent Medications:  I have reviewed this patient's current medications      isosorbide dinitrate  10 mg Oral Q8H     vancomycin (VANCOCIN) IV  1,250 mg Intravenous Q18H     aspirin  324 mg Oral or Feeding Tube Daily     heparin lock flush  5-10 mL Intracatheter Q24H     QUEtiapine  12.5 mg Oral At Bedtime     piperacillin-tazobactam  4.5 g Intravenous Q6H     pantoprazole  40 mg Per Feeding Tube BID     zinc sulfate (20 mg [place]. Zn/mL)  220 mg Per Feeding Tube Daily     vitamin A-D & C drops  7 mL Per Feeding Tube BID     atorvastatin  40 mg Oral or Feeding Tube Daily at 8 pm     pneumococcal vaccine  0.5 mL Intramuscular Once     sodium chloride (PF)  3 mL Intracatheter Q8H     multivitamins with minerals  15 mL Per Feeding Tube Daily         Examination:  /74 (BP Location: Left arm)  Temp 98.1  F (36.7  C)  Resp 15  Ht 1.575 m (5' 2.01\")  Wt 60 kg (132 lb 4.4 oz)  SpO2 100%  BMI 24.19 kg/m2  GEN: Intubated and sedated  NECK: can not assess JVP   RESP: +crackles   CV: S1S2S3, holosystolic murmur at the apex  ABD: Soft, nontender to palpation, no HSM, +BS, no guarding  EXT: No peripheral edema, warm/well perfused   SKIN: Normal skin turgor, no rash     Data:  CMP  Recent Labs  Lab 04/26/17  0400 04/25/17  2337 04/25/17  1651 04/25/17  1244 04/25/17  0557 04/25/17  0154 04/24/17  1758 04/24/17  1228  " 04/24/17  0414  04/23/17  0525 04/22/17  1641  04/22/17  0350     --  138 136  --  141 138  --   --  141  < > 140 140  --  142   POTASSIUM 3.4  --  3.9 3.6 3.5 4.2 4.3  --   < > 4.0  < > 4.0 4.0  < > 4.0   CHLORIDE 101  --  98 99  --  101 102  --   --  102  < > 100 103  --  104   CO2 27  --  32 30  --  30 30  --   --  31  < > 32 30  --  31   ANIONGAP 14  --  8 7  --  10 6  --   --  9  < > 7 7  --  8   * 287* 260* 251*  --  212* 248*  --   --  197*  < > 214* 243*  --  104*   BUN 27  --  24 21  --  20 20  --   --  21  < > 22 22  --  22   CR 1.36*  --  1.32* 1.08  --  1.14 1.14  --   --  1.12  < > 1.23 1.14  --  1.24   GFRESTIMATED 56*  --  57* 72  --  68 68  --   --  69  < > 62 68  --  62   GFRESTBLACK 67  --  70 88  --  82 82  --   --  84  < > 75 82  --  75   ROB 7.5*  --  7.6* 7.4*  --  7.8* 7.8*  --   --  8.0*  < > 7.8* 7.6*  --  7.8*   MAG 2.4*  --  2.2  --   --  2.2 2.2 2.2  --  2.2  < > 2.2 2.1  --  2.2   PHOS 2.1*  --   --   --   --   --   --  2.6  --   --   --   --  3.1  --  3.2   PROTTOTAL 6.8  --   --   --   --  7.2  --   --   --  6.5*  --  6.9  --   --  6.5*   ALBUMIN 1.8*  --   --   --   --  2.0*  --   --   --  1.9*  --  2.0*  --   --  1.9*   BILITOTAL 0.5  --   --   --   --  0.4  --   --   --  0.6  --  1.0  --   --  0.5   ALKPHOS 66  --   --   --   --  69  --   --   --  62  --  68  --   --  63   AST 23  --   --   --   --  22  --   --   --  24  --  35  --   --  49*   ALT 24  --   --   --   --  36  --   --   --  38  --  49  --   --  49   < > = values in this interval not displayed.  CBC    Recent Labs  Lab 04/26/17  0400 04/25/17  0154 04/24/17  0414 04/23/17  0525   WBC 14.9* 13.1* 9.5 7.4   RBC 2.64* 2.74* 2.59* 2.55*   HGB 7.9* 8.2* 7.6* 7.5*   HCT 26.2* 27.4* 25.7* 25.6*   MCV 99 100 99 100   MCH 29.9 29.9 29.3 29.4   MCHC 30.2* 29.9* 29.6* 29.3*   RDW 18.1* 17.3* 17.0* 16.9*    316 278 268     INR    Recent Labs  Lab 04/20/17  0345 04/19/17  1159   INR 1.25* 1.23*     Arterial Blood  Gas    Recent Labs  Lab 04/26/17  0755 04/26/17  0400 04/25/17  2337 04/25/17  2100  04/25/17  1434 04/25/17  1235 04/25/17  1134   PH  --   --  7.49*  --   --  7.53* 7.51* 7.51*   PCO2  --   --  41  --   --  36 42 34*   PO2  --   --  168*  --   --  57* 95 56*   HCO3  --   --  31*  --   --  30* 33* 27   O2PER 50 50 60  60 70  < > 45 100 70  70   < > = values in this interval not displayed.    Imaging Studies:  Echo 3/27  The visual ejection fraction is estimated at 30-35%.  There is extensive inferior, inferoseptal, and inferolateral wall akinesis.  Basal/mid lateral wall hypokinesis  There is moderate to severe septal hypokinesis.  Septal wall motion abnormality may reflect pacemaker activation.  There is a catheter/pacemaker lead seen in the right ventricle.  The right ventricle is mildly dilated.  Severely decreased right ventricular systolic function  There is no pericardial effusion.  The rhythm was paced.  Compared to the prior study, the LVEF appears somewhat decreased. Septal wall  motion abnormality larger- may reflect, in part, that patient in sinus tach on  prior study, and ventricular paced now. No hemodynamically significant  valvular abnormalities on 2D or color flow imaging     CT chest/abdomen 3/27   IMPRESSION:   1. Small mediastinal hemorrhage, small bilateral pleural effusions  which may be hemorrhagic. Small intraperitoneal hemorrhage. Minimal  pericardial hemorrhage.  2. Minimal pneumoperitoneum in the left paracolic gutter, of unknown  significance. No pneumatosis as clinically questioned.  3. IABP slightly low in position.  4. Bilateral pulmonary dependent consolidations represent compression  atelectasis, however differentials include aspiration.     Cath 3/26 [facility]  SUMMARY:   --Inferior STEMI w/ late presentation. Culprit dictated by complete  heart block, and cardiogenic shock. Emergent echo in the Cath Lab also  shows significant RV dysfunction.  --Three vessel coronary  artery disease with diffuse coronary disease  out to the distal vessels  --Successful POBA of the prox and mid-RCA. Stent was not placed, in  anticipation he may need to be considered for bypass surgery in the  near future  --Insertion of a temporary pacemaker for bradycardia (AVB) and  hypotension  --Insertion of a IABP  --Upper GI bleed consistent with [place]     Cath [place] 3/27     CT head 3/28: normal      Echo 3/29  Interpretation Summary  Limited study. Ischemic CM. Moderately (EF 30-35%) reduced left ventricular  function is present. Traced at 32%.  Posterior wall akinesis is present. Lateral wall akinesis is present. Inferior  wall akinesis is present.  Right ventricular function, chamber size, wall motion, and thickness are  normal.  Dilation of the inferior vena cava is present with abnormal respiratory  variation in diameter.     Compared to prior study, RV fxn is improved.     Echo 3/31  Left Ventricle  The Ejection Fraction is estimated at 50-55%. Inferior,posterior,lateral,basal  septal wall akinesis as reported before. No change.     CORONARY ANGIOGRAM 4/1:   1. Both coronary arteries arise from their respective cusps.  2. Right dominant.  3. LM has mild luminal irregularities.   4. LAD supplies the apex along with the RCA (type 2 LAD) and gives rise to septal perforators and a large and branching D1. There are widely patent stents extending from the proximal to mid LAD. The dLAD has mild to moderate disease to 30% stenosis. The D1 is jailed by the LAD stents, but has EBER 3 flow with disease to 30% stenosis.   5. The small ramus is no longer present.  6. LCX is occluded at the ostium.   7. RCA gives rise to PL branches and supplies PDA. There are widely patent stents extending from the proximal to mid RCA. The remainder of the mRCA has disease to 50% stenosis and the dRCA has disease to 10% stenosis. The RPDA has a widely patent stent and the remainder of the artery has mild disease to  10% stenosis. The RPLA has small vessels with diffuse disease including a distal branch occlusion. The RPLA supplies some small collateral branches to the dLCx.      COMPLICATIONS:  1. None      SUMMARY:   1. Cardiogenic shock following an inferior STEMI.  2. The LCx re-occlusion is not surprising as the recently revascularized LCx  had poor outflow and the revascularized portion did not supply a large territory (limited to no flow was present distal to the stented segment immediately following stenting). Repeat revascularization of the LCx would result in the same outcome of repeat closure and also risk embolizing thrombus from the LCx into the LAD so no treatment of the occluded LCx was performed.   3. Three vessel coronary artery disease with patent LAD and RCA stents and an occluded LCx.     Echo 4/3  Left ventricular size is normal.  The Ejection Fraction is estimated at 35-40%.  Inferior wall akinesis is present.  Lateral wall akinesis is present.  Posterior wall akinesis is present.  Right ventricular function, chamber size, wall motion, and thickness are  normal.  Moderate mitral insufficiency is present.  Moderate tricuspid insufficiency is present.  Right ventricular systolic pressure is 47mmHg above the right atrial pressure.  The inferior vena cava is normal.     Echo 4/5  Moderately (EF 35-40%) reduced left ventricular function is present. Traced at  40%.  Moderate mitral insufficiency is present.  The cause of the mitral insufficiency appears to be restricted posterior  leaflet from posterior MI. No mitral leaflet pathology seen.  Dilation of the inferior vena cava is present with abnormal respiratory  variation in diameter.     CT abd without contrast 4/4  IMPRESSION:   1. No evidence of ischemic bowel, obstruction, or intra-abdominal  infection.  2. Bibasilar patchy pulmonary opacities likely represent combination  of aspiration and atelectasis.  3. Small amount of simple free fluid within the  lower abdomen.  4. Small left retroperitoneal hematoma along the iliac vasculature  likely postprocedural.   5. Unchanged size of bilateral pleural effusions, which now consist of  simple fluid.    Assessment and Plan  51 year old man presented with cardiogenic shock from inferior STEMI s/p RCA aspiration thrombectomy and POBA on 3/26 at [facility] s/p IABP and initiation of Dopamine, also during procedure, CHB s/p temp pacer, emesis/hematmesis and altered mental status s/p intubation transferred here for consideration of mechanical support on 3/27. Had PCI to RCA, LAD and LCx (on ASA and plavix) started on epinephrine in addition to dopamine, post procedure developed distributive shock picture from infection (aspiration pneumonia? Sputum cx growing staph aureus, on vanco and zosyn) vs post-MI SIRS response. Currently in cardiogenic shock with IABP in situ    Card  # Cardiogenic shock 4/3- from underlying 3v CAD-has had high SVR and low CI, complicated by ischemic MR  # Due to inferior wall STEMI. S/P 7 stents to LAD, circ, RCA (angiogram report pending). Now with IABP, temporary pacemaker after 3rd deg heart block in cath lab at [facility] on 3/26   # Small pericardial hemorrhage   # Remains unclear why we are having a hard time weaning him off of IABP in the setting of EF of 3-35%. Microvascular disease is a possibility.  - IABP in place  - Hold off on isordil for now  - Cortisol normal  - Continue Milrinone 0.125  - Plan to keep CVP < 10  - Continue Hydralazine as tolerated (25 mg q6H)  - f/u blood cultures  - Bronchoscopy and BAL  - Closely monitor hemodynamics (currently cardiogenic shock, but given recent fever and concern for aspiration pna, could potentially develop a septic picture overnight)  - Will likely need a MV intervention (MVR v MitraClip) when stable enough (given severe MR on echo, and his reliance on afterload reduction with Milrinone, Hydral and IABP)    # Neuro  - intubated,  sedated         Respiratory  - Intubated in the setting of worsening hypoxia on 4/25  - Possible causes: aspiration PNA vs PE vs volume overload   - Diuresing to CVP < 10, already on heparin inf for IABP, and being treated with Vanc and Pip/tazo  - Pulmonary consulted, planning for bronch today         # ID  - UA 4/15 with cloudy urine, 50 WBCs, many bacteria, no nitrates. UCx grew E coli - treated with CFTX (sensitive)  - Fever on 4/20 and single spike 100.2 overnight with blood cultures in process (no growth thus far), PICC and SGC removed, started on Vanc and pip/tazo  - 4/25 new fever and concern for aspiration pneumonia, continuing Vanc/zosyn. ID following         # Endocrine  T2DM: HA1C 7.5 on admission.  - Insulin gtt per nurising protocol with hypoglycemia precautions.   - Switch to SQ Lantus 4/22      # GI  - Had several maroon BMs since 4/11, Hb overall dropped by a 1.5 grams or so, heparin inf was held and GI consulted  - Colonoscopy 4/17 showed a rectal ulcer but no intervenable lesions  - Will continue to monitor Hb, back on heparin inf  - Protonix 40 PO BID         # Renal/  - Daily Cr  - Avoid nephrotoxins as able  - Plan to keep CVP < 10     -Hypernatremia  - Resolved. Stopped his D5W, only getting FWF's.        FEN: feeding tube, and dysphagia level II  Code: FULL  PPx: PPI 40mg BID, senna PRN  Dispo: pending stability    LINES:  - SC IABP  4/19  - LUE PICC   4/20  - Chavis catheter  4/20        Plan of care discussed with Dr. Alvina Loving MD  Cardiovascular Disease Fellow  Pager: [phone]      Critical Care ICU Note - Cardiology  Marvin Tinsley M.D.    Impression:  Recent surgery  Cardiogenic shock  Acute and chronic congestive heart failure  Acute respiratory failure  Acute renal failure    The patient remains unstable in the ICU with on-going need for ventilator support, parenteral medications for the adjustment of blood pressure and cardiac output and maintenance of renal  function.      The patient is seen for prolonged ventilator management requiring oxygen and/or pressure modulation; shock requiring vasopressor and or inotropic agents; low cardiac output necessitating  inotropic agents, vasopressors and afterload reducing agents; limited mechanical support requiring IABP; acute renal failure requiring fluid and diuretic management; sepsis requiring antibiotic selection and monitoring, vasopressors, fluid management to maintain blood pressure and secondary organ function    I personally reviewed:    Arterial and venous blood gases to assess acid base balance, oxygenation, and ventilator settings.      Hemodynamic parameters obtained by central hemodynamic monitoring including RAP, estimated LVEDP, pulmonary artery pressure, cardiac output and vascular resistances in order to adjust fluids and infused medications for blood pressure and cardiac output maintenance.    Ventilatory settings and/or supplement oxygen needs in order to obtain optimal oxygenation and electrolyte balance at low possible pressure support and inspired oxygen tension    Volume status, renal function and nutritional support as judged by intake, output, daily weight and appropriate laboratories.    I reviewed individual and serial imaging studies including echocardiogram, chest x-ray, CT scan    I personally supervised the prescription of parenteral fluids, inotropes, vasodilators , and vasopressors in order to correct cardiac output, maintain urine output and renal function.    I personall discussed [facility] pateints condition with the patient or family designated decision maker in order to discuss current and on-going diagnostic and therapeutic options.    45 minutes

## 2017-04-26 NOTE — PLAN OF CARE
Problem: Goal Outcome Summary  Goal: Goal Outcome Summary  1) pt will be hemodynamically stable  2) pt will tolerate weaning of IABP  3) pain will be adequately controlled     Outcome: Declining  DX:Cardiogenic shock (Admitted 3/27/17)     D/I: 52 y/o male admitted with cardiogenic shock post MI requiring PCI x 7 stents and IABP. Milrinone gtt reduced from 0.375 to 0.25 mcg/kg/min in an attempt to reduce HR, which has climbed to nearly 150 today. CVP 12-8-10. Subclavian IABP at 1:2 with 100% augmentation. Crackles auscultated throughout lung fields. Breaths diminished, shallow, and patient expresses ongoing dyspnea, requiring increased oxygen support and ultimately intubation at 1550. Post intubation echo at bedside resulted as EF 30-35% with severe mitral valve insufficiency. Chest CT obtained for potential aspiration-results pending. Post intubation has required nipride gtt to be stopped r/t MAP's <65. Sedation accomplished with versed gtt. TF at goal rate of 50 ml/hr. BM x 1 with incontinence. Elevated blood glucose all day, requiring increased doses of insulin.      A: Patient status has undoubtedly regressed, requiring intubation.     P: Adjust ventilatory support as tolerated. Keep MAP's >65. 10a recheck at 2100. Consider insulin gtt or higher sliding scale. Continue plan of care as ordered.     Bryant TIRADON, RN, CCRN  4E

## 2017-04-27 ENCOUNTER — APPOINTMENT (OUTPATIENT)
Dept: GENERAL RADIOLOGY | Facility: CLINIC | Age: 52
DRG: 228 | End: 2017-04-27
Attending: INTERNAL MEDICINE
Payer: COMMERCIAL

## 2017-04-27 LAB
ALBUMIN SERPL-MCNC: 1.8 G/DL (ref 3.4–5)
ALP SERPL-CCNC: 75 U/L (ref 40–150)
ALT SERPL W P-5'-P-CCNC: 24 U/L (ref 0–70)
ANION GAP SERPL CALCULATED.3IONS-SCNC: 6 MMOL/L (ref 3–14)
ANION GAP SERPL CALCULATED.3IONS-SCNC: 6 MMOL/L (ref 3–14)
AST SERPL W P-5'-P-CCNC: 26 U/L (ref 0–45)
BASE EXCESS BLDA CALC-SCNC: 5.5 MMOL/L
BASE EXCESS BLDV CALC-SCNC: 6.5 MMOL/L
BASE EXCESS BLDV CALC-SCNC: 6.7 MMOL/L
BASE EXCESS BLDV CALC-SCNC: 7 MMOL/L
BASE EXCESS BLDV CALC-SCNC: 7.8 MMOL/L
BASE EXCESS BLDV CALC-SCNC: 8.1 MMOL/L
BASE EXCESS BLDV CALC-SCNC: 8.5 MMOL/L
BILIRUB DIRECT SERPL-MCNC: 0.2 MG/DL (ref 0–0.2)
BILIRUB SERPL-MCNC: 0.4 MG/DL (ref 0.2–1.3)
BUN SERPL-MCNC: 30 MG/DL (ref 7–30)
BUN SERPL-MCNC: 31 MG/DL (ref 7–30)
CA-I BLD-MCNC: 4.4 MG/DL (ref 4.4–5.2)
CALCIUM SERPL-MCNC: 7.7 MG/DL (ref 8.5–10.1)
CALCIUM SERPL-MCNC: 7.8 MG/DL (ref 8.5–10.1)
CHLORIDE SERPL-SCNC: 102 MMOL/L (ref 94–109)
CHLORIDE SERPL-SCNC: 104 MMOL/L (ref 94–109)
CO2 SERPL-SCNC: 31 MMOL/L (ref 20–32)
CO2 SERPL-SCNC: 32 MMOL/L (ref 20–32)
CREAT SERPL-MCNC: 1.23 MG/DL (ref 0.66–1.25)
CREAT SERPL-MCNC: 1.28 MG/DL (ref 0.66–1.25)
ERYTHROCYTE [DISTWIDTH] IN BLOOD BY AUTOMATED COUNT: 18.2 % (ref 10–15)
FLUAV H1 2009 PAND RNA SPEC QL NAA+PROBE: NEGATIVE
FLUAV H1 RNA SPEC QL NAA+PROBE: NEGATIVE
FLUAV H3 RNA SPEC QL NAA+PROBE: NEGATIVE
FLUAV RNA SPEC QL NAA+PROBE: NEGATIVE
FLUBV RNA SPEC QL NAA+PROBE: NEGATIVE
GFR SERPL CREATININE-BSD FRML MDRD: 60 ML/MIN/1.7M2
GFR SERPL CREATININE-BSD FRML MDRD: 62 ML/MIN/1.7M2
GLUCOSE BLDC GLUCOMTR-MCNC: 117 MG/DL (ref 70–99)
GLUCOSE BLDC GLUCOMTR-MCNC: 125 MG/DL (ref 70–99)
GLUCOSE BLDC GLUCOMTR-MCNC: 126 MG/DL (ref 70–99)
GLUCOSE BLDC GLUCOMTR-MCNC: 127 MG/DL (ref 70–99)
GLUCOSE BLDC GLUCOMTR-MCNC: 133 MG/DL (ref 70–99)
GLUCOSE BLDC GLUCOMTR-MCNC: 135 MG/DL (ref 70–99)
GLUCOSE BLDC GLUCOMTR-MCNC: 141 MG/DL (ref 70–99)
GLUCOSE BLDC GLUCOMTR-MCNC: 142 MG/DL (ref 70–99)
GLUCOSE BLDC GLUCOMTR-MCNC: 144 MG/DL (ref 70–99)
GLUCOSE BLDC GLUCOMTR-MCNC: 151 MG/DL (ref 70–99)
GLUCOSE BLDC GLUCOMTR-MCNC: 153 MG/DL (ref 70–99)
GLUCOSE BLDC GLUCOMTR-MCNC: 203 MG/DL (ref 70–99)
GLUCOSE SERPL-MCNC: 132 MG/DL (ref 70–99)
GLUCOSE SERPL-MCNC: 153 MG/DL (ref 70–99)
HADV DNA SPEC QL NAA+PROBE: NEGATIVE
HADV DNA SPEC QL NAA+PROBE: NEGATIVE
HCO3 BLD-SCNC: 30 MMOL/L (ref 21–28)
HCO3 BLDV-SCNC: 32 MMOL/L (ref 21–28)
HCO3 BLDV-SCNC: 33 MMOL/L (ref 21–28)
HCO3 BLDV-SCNC: 33 MMOL/L (ref 21–28)
HCO3 BLDV-SCNC: 34 MMOL/L (ref 21–28)
HCT VFR BLD AUTO: 25.5 % (ref 40–53)
HGB BLD-MCNC: 7.6 G/DL (ref 13.3–17.7)
HMPV RNA SPEC QL NAA+PROBE: NEGATIVE
HPIV1 RNA SPEC QL NAA+PROBE: NEGATIVE
HPIV2 RNA SPEC QL NAA+PROBE: NEGATIVE
HPIV3 RNA SPEC QL NAA+PROBE: NEGATIVE
LMWH PPP CHRO-ACNC: 0.28 IU/ML
LMWH PPP CHRO-ACNC: NORMAL IU/ML
MAGNESIUM SERPL-MCNC: 2.5 MG/DL (ref 1.6–2.3)
MAGNESIUM SERPL-MCNC: 2.6 MG/DL (ref 1.6–2.3)
MAGNESIUM SERPL-MCNC: 2.6 MG/DL (ref 1.6–2.3)
MCH RBC QN AUTO: 29.9 PG (ref 26.5–33)
MCHC RBC AUTO-ENTMCNC: 29.8 G/DL (ref 31.5–36.5)
MCV RBC AUTO: 100 FL (ref 78–100)
MICROBIOLOGIST REVIEW: NORMAL
O2/TOTAL GAS SETTING VFR VENT: 40 %
O2/TOTAL GAS SETTING VFR VENT: 45 %
O2/TOTAL GAS SETTING VFR VENT: 45 %
O2/TOTAL GAS SETTING VFR VENT: 50 %
OXYHGB MFR BLD: 97 % (ref 92–100)
OXYHGB MFR BLDV: 43 %
OXYHGB MFR BLDV: 45 %
OXYHGB MFR BLDV: 47 %
OXYHGB MFR BLDV: 50 %
OXYHGB MFR BLDV: 55 %
OXYHGB MFR BLDV: 57 %
PCO2 BLD: 45 MM HG (ref 35–45)
PCO2 BLDV: 50 MM HG (ref 40–50)
PCO2 BLDV: 51 MM HG (ref 40–50)
PCO2 BLDV: 51 MM HG (ref 40–50)
PCO2 BLDV: 52 MM HG (ref 40–50)
PCO2 BLDV: 53 MM HG (ref 40–50)
PCO2 BLDV: 54 MM HG (ref 40–50)
PH BLD: 7.44 PH (ref 7.35–7.45)
PH BLDV: 7.38 PH (ref 7.32–7.43)
PH BLDV: 7.39 PH (ref 7.32–7.43)
PH BLDV: 7.4 PH (ref 7.32–7.43)
PH BLDV: 7.42 PH (ref 7.32–7.43)
PH BLDV: 7.42 PH (ref 7.32–7.43)
PH BLDV: 7.43 PH (ref 7.32–7.43)
PHOSPHATE SERPL-MCNC: 2.4 MG/DL (ref 2.5–4.5)
PHOSPHATE SERPL-MCNC: 2.9 MG/DL (ref 2.5–4.5)
PLATELET # BLD AUTO: 355 10E9/L (ref 150–450)
PO2 BLD: 140 MM HG (ref 80–105)
PO2 BLDV: 27 MM HG (ref 25–47)
PO2 BLDV: 29 MM HG (ref 25–47)
PO2 BLDV: 31 MM HG (ref 25–47)
PO2 BLDV: 31 MM HG (ref 25–47)
PO2 BLDV: 32 MM HG (ref 25–47)
PO2 BLDV: 33 MM HG (ref 25–47)
POTASSIUM SERPL-SCNC: 3.9 MMOL/L (ref 3.4–5.3)
POTASSIUM SERPL-SCNC: 4 MMOL/L (ref 3.4–5.3)
POTASSIUM SERPL-SCNC: 4.3 MMOL/L (ref 3.4–5.3)
PROT SERPL-MCNC: 6.6 G/DL (ref 6.8–8.8)
RBC # BLD AUTO: 2.54 10E12/L (ref 4.4–5.9)
RHINOVIRUS RNA SPEC QL NAA+PROBE: NEGATIVE
RSV RNA SPEC QL NAA+PROBE: NEGATIVE
RSV RNA SPEC QL NAA+PROBE: NEGATIVE
SODIUM SERPL-SCNC: 139 MMOL/L (ref 133–144)
SODIUM SERPL-SCNC: 142 MMOL/L (ref 133–144)
SPECIMEN SOURCE: NORMAL
THIOCYANATE SERPL-MCNC: NORMAL UG/ML
WBC # BLD AUTO: 15.5 10E9/L (ref 4–11)

## 2017-04-27 PROCEDURE — 85027 COMPLETE CBC AUTOMATED: CPT | Performed by: INTERNAL MEDICINE

## 2017-04-27 PROCEDURE — 25000125 ZZHC RX 250

## 2017-04-27 PROCEDURE — 94003 VENT MGMT INPAT SUBQ DAY: CPT

## 2017-04-27 PROCEDURE — 00000146 ZZHCL STATISTIC GLUCOSE BY METER IP

## 2017-04-27 PROCEDURE — S0171 BUMETANIDE 0.5 MG: HCPCS | Performed by: STUDENT IN AN ORGANIZED HEALTH CARE EDUCATION/TRAINING PROGRAM

## 2017-04-27 PROCEDURE — 80048 BASIC METABOLIC PNL TOTAL CA: CPT | Performed by: INTERNAL MEDICINE

## 2017-04-27 PROCEDURE — 85520 HEPARIN ASSAY: CPT | Performed by: INTERNAL MEDICINE

## 2017-04-27 PROCEDURE — 25000128 H RX IP 250 OP 636

## 2017-04-27 PROCEDURE — 82330 ASSAY OF CALCIUM: CPT | Performed by: INTERNAL MEDICINE

## 2017-04-27 PROCEDURE — 20000004 ZZH R&B ICU UMMC

## 2017-04-27 PROCEDURE — 84132 ASSAY OF SERUM POTASSIUM: CPT | Performed by: INTERNAL MEDICINE

## 2017-04-27 PROCEDURE — S0171 BUMETANIDE 0.5 MG: HCPCS

## 2017-04-27 PROCEDURE — 25000125 ZZHC RX 250: Performed by: INTERNAL MEDICINE

## 2017-04-27 PROCEDURE — 40000014 ZZH STATISTIC ARTERIAL MONITORING DAILY

## 2017-04-27 PROCEDURE — 25000132 ZZH RX MED GY IP 250 OP 250 PS 637: Performed by: INTERNAL MEDICINE

## 2017-04-27 PROCEDURE — 40000076 ZZH STATISTIC IABP MONITORING

## 2017-04-27 PROCEDURE — 25000125 ZZHC RX 250: Performed by: STUDENT IN AN ORGANIZED HEALTH CARE EDUCATION/TRAINING PROGRAM

## 2017-04-27 PROCEDURE — 27210437 ZZH NUTRITION PRODUCT SEMIELEM INTERMED LITER

## 2017-04-27 PROCEDURE — 83735 ASSAY OF MAGNESIUM: CPT | Performed by: INTERNAL MEDICINE

## 2017-04-27 PROCEDURE — 40000275 ZZH STATISTIC RCP TIME EA 10 MIN

## 2017-04-27 PROCEDURE — 84100 ASSAY OF PHOSPHORUS: CPT | Performed by: INTERNAL MEDICINE

## 2017-04-27 PROCEDURE — 80076 HEPATIC FUNCTION PANEL: CPT | Performed by: INTERNAL MEDICINE

## 2017-04-27 PROCEDURE — 82805 BLOOD GASES W/O2 SATURATION: CPT | Performed by: INTERNAL MEDICINE

## 2017-04-27 PROCEDURE — 40000196 ZZH STATISTIC RAPCV CVP MONITORING

## 2017-04-27 PROCEDURE — 25000128 H RX IP 250 OP 636: Performed by: INTERNAL MEDICINE

## 2017-04-27 PROCEDURE — 25000132 ZZH RX MED GY IP 250 OP 250 PS 637: Performed by: STUDENT IN AN ORGANIZED HEALTH CARE EDUCATION/TRAINING PROGRAM

## 2017-04-27 PROCEDURE — 25000132 ZZH RX MED GY IP 250 OP 250 PS 637

## 2017-04-27 PROCEDURE — 71010 XR CHEST PORT 1 VW: CPT

## 2017-04-27 RX ORDER — BUMETANIDE 0.25 MG/ML
2 INJECTION INTRAMUSCULAR; INTRAVENOUS ONCE
Status: DISCONTINUED | OUTPATIENT
Start: 2017-04-27 | End: 2017-04-27

## 2017-04-27 RX ORDER — BUMETANIDE 0.25 MG/ML
2 INJECTION INTRAMUSCULAR; INTRAVENOUS ONCE
Status: COMPLETED | OUTPATIENT
Start: 2017-04-27 | End: 2017-04-27

## 2017-04-27 RX ADMIN — POTASSIUM PHOSPHATE, MONOBASIC AND POTASSIUM PHOSPHATE, DIBASIC 15 MMOL: 224; 236 INJECTION, SOLUTION INTRAVENOUS at 22:54

## 2017-04-27 RX ADMIN — Medication: at 18:07

## 2017-04-27 RX ADMIN — Medication 12.5 MG: at 21:27

## 2017-04-27 RX ADMIN — ACETAMINOPHEN 650 MG: 325 TABLET, FILM COATED ORAL at 04:20

## 2017-04-27 RX ADMIN — MEROPENEM 1 G: 1 INJECTION, POWDER, FOR SOLUTION INTRAVENOUS at 04:16

## 2017-04-27 RX ADMIN — HUMAN INSULIN 3.5 UNITS/HR: 100 INJECTION, SOLUTION SUBCUTANEOUS at 17:16

## 2017-04-27 RX ADMIN — HUMAN INSULIN 3 UNITS/HR: 100 INJECTION, SOLUTION SUBCUTANEOUS at 07:46

## 2017-04-27 RX ADMIN — MEROPENEM 1 G: 1 INJECTION, POWDER, FOR SOLUTION INTRAVENOUS at 13:09

## 2017-04-27 RX ADMIN — ATORVASTATIN CALCIUM 40 MG: 40 TABLET, FILM COATED ORAL at 20:01

## 2017-04-27 RX ADMIN — Medication 7 ML: at 07:51

## 2017-04-27 RX ADMIN — Medication 7 ML: at 20:01

## 2017-04-27 RX ADMIN — BUMETANIDE 2 MG: 0.25 INJECTION, SOLUTION INTRAMUSCULAR; INTRAVENOUS at 12:49

## 2017-04-27 RX ADMIN — VANCOMYCIN HYDROCHLORIDE 1250 MG: 10 INJECTION, POWDER, LYOPHILIZED, FOR SOLUTION INTRAVENOUS at 06:07

## 2017-04-27 RX ADMIN — HUMAN INSULIN 7 UNITS/HR: 100 INJECTION, SOLUTION SUBCUTANEOUS at 00:25

## 2017-04-27 RX ADMIN — ACETAMINOPHEN 650 MG: 325 TABLET, FILM COATED ORAL at 20:00

## 2017-04-27 RX ADMIN — Medication 220 MG: at 07:51

## 2017-04-27 RX ADMIN — PANTOPRAZOLE SODIUM 40 MG: 40 TABLET, DELAYED RELEASE ORAL at 07:52

## 2017-04-27 RX ADMIN — FENTANYL CITRATE 50 MCG/HR: 50 INJECTION, SOLUTION INTRAMUSCULAR; INTRAVENOUS at 21:46

## 2017-04-27 RX ADMIN — ASPIRIN 81 MG CHEWABLE TABLET 324 MG: 81 TABLET CHEWABLE at 07:51

## 2017-04-27 RX ADMIN — MEROPENEM 1 G: 1 INJECTION, POWDER, FOR SOLUTION INTRAVENOUS at 20:01

## 2017-04-27 RX ADMIN — BUMETANIDE 2 MG: 0.25 INJECTION INTRAMUSCULAR; INTRAVENOUS at 20:06

## 2017-04-27 RX ADMIN — MULTIVIT AND MINERALS-FERROUS GLUCONATE 9 MG IRON/15 ML ORAL LIQUID 15 ML: at 07:51

## 2017-04-27 RX ADMIN — POTASSIUM CHLORIDE 20 MEQ: 29.8 INJECTION, SOLUTION INTRAVENOUS at 21:28

## 2017-04-27 RX ADMIN — PANTOPRAZOLE SODIUM 40 MG: 40 TABLET, DELAYED RELEASE ORAL at 20:01

## 2017-04-27 RX ADMIN — BUMETANIDE 2 MG: 0.25 INJECTION, SOLUTION INTRAMUSCULAR; INTRAVENOUS at 07:00

## 2017-04-27 NOTE — PLAN OF CARE
Problem: Goal Outcome Summary  Goal: Goal Outcome Summary  1) pt will be hemodynamically stable  2) pt will tolerate weaning of IABP  3) pain will be adequately controlled     PT 4E: Holding - Pt intubated, off sedation, however unable to follow commands. OT continuing to follow for PROM needs. PT to hold at this time, but continuing to follow peripherally. Will re-initiate service when pt is better able to actively participate and tolerate two therapy disciplines.

## 2017-04-27 NOTE — PROGRESS NOTES
"[facility]  Cardiology II Service / Advanced Heart Failure        Interval History:   - Overnight had to be started on dopamine.   - Underwent bronchoscopy yesterday        Intake/Output Summary (Last 24 hours) at 04/27/17 1525  Last data filed at 04/27/17 1500   Gross per 24 hour   Intake          2430.75 ml   Output             1660 ml   Net           770.75 ml               Pertinent Medications:  I have reviewed this patient's current medications      meropenem  1 g Intravenous Q8H     vancomycin (VANCOCIN) IV  1,250 mg Intravenous Q18H     aspirin  324 mg Oral or Feeding Tube Daily     heparin lock flush  5-10 mL Intracatheter Q24H     QUEtiapine  12.5 mg Oral At Bedtime     pantoprazole  40 mg Per Feeding Tube BID     zinc sulfate (20 mg [place]. Zn/mL)  220 mg Per Feeding Tube Daily     vitamin A-D & C drops  7 mL Per Feeding Tube BID     atorvastatin  40 mg Oral or Feeding Tube Daily at 8 pm     pneumococcal vaccine  0.5 mL Intramuscular Once     sodium chloride (PF)  3 mL Intracatheter Q8H     multivitamins with minerals  15 mL Per Feeding Tube Daily         Examination:  BP (!) 77/54  Temp 98.8  F (37.1  C)  Resp 12  Ht 1.575 m (5' 2.01\")  Wt 59.4 kg (130 lb 15.3 oz)  SpO2 100%  BMI 23.95 kg/m2  GEN: Intubated and sedated  NECK: can not assess JVP   RESP: +crackles   CV: S1S2S3, holosystolic murmur at the apex  ABD: Soft, nontender to palpation, no HSM, +BS, no guarding  EXT: No peripheral edema, warm/well perfused   SKIN: Normal skin turgor, no rash     Data:  CMP  Recent Labs  Lab 04/27/17  0433 04/26/17  2039 04/26/17  1607 04/26/17  0400 04/25/17  2337 04/25/17  1651  04/25/17  0154  04/24/17  1228  04/24/17  0414     --  140 142  --  138  < > 141  < >  --   --  141   POTASSIUM 4.3 4.5 3.8 3.4  --  3.9  < > 4.2  < >  --   < > 4.0   CHLORIDE 102  --  103 101  --  98  < > 101  < >  --   --  102   CO2 31  --  31 27  --  32  < > 30  < >  --   --  31 "   ANIONGAP 6  --  6 14  --  8  < > 10  < >  --   --  9   *  --  130* 200* 287* 260*  < > 212*  < >  --   --  197*   BUN 30  --  31* 27  --  24  < > 20  < >  --   --  21   CR 1.28*  --  1.60* 1.36*  --  1.32*  < > 1.14  < >  --   --  1.12   GFRESTIMATED 60*  --  46* 56*  --  57*  < > 68  < >  --   --  69   GFRESTBLACK 72  --  56* 67  --  70  < > 82  < >  --   --  84   RBO 7.8*  --  7.1* 7.5*  --  7.6*  < > 7.8*  < >  --   --  8.0*   MAG 2.6* 2.4* 2.3 2.4*  --  2.2  --  2.2  < > 2.2  --  2.2   PHOS 2.9 2.8  --  2.1*  --   --   --   --   --  2.6  --   --    PROTTOTAL 6.6*  --   --  6.8  --   --   --  7.2  --   --   --  6.5*   ALBUMIN 1.8*  --   --  1.8*  --   --   --  2.0*  --   --   --  1.9*   BILITOTAL 0.4  --   --  0.5  --   --   --  0.4  --   --   --  0.6   ALKPHOS 75  --   --  66  --   --   --  69  --   --   --  62   AST 26  --   --  23  --   --   --  22  --   --   --  24   ALT 24  --   --  24  --   --   --  36  --   --   --  38   < > = values in this interval not displayed.  CBC    Recent Labs  Lab 04/27/17  0433 04/26/17  0400 04/25/17  0154 04/24/17 0414   WBC 15.5* 14.9* 13.1* 9.5   RBC 2.54* 2.64* 2.74* 2.59*   HGB 7.6* 7.9* 8.2* 7.6*   HCT 25.5* 26.2* 27.4* 25.7*    99 100 99   MCH 29.9 29.9 29.9 29.3   MCHC 29.8* 30.2* 29.9* 29.6*   RDW 18.2* 18.1* 17.3* 17.0*    317 316 278     INR  No lab results found in last 7 days.  Arterial Blood Gas    Recent Labs  Lab 04/27/17  0732 04/27/17  0345 04/27/17  0017 04/26/17  2039  04/25/17  2337  04/25/17  1434 04/25/17  1235   PH  --  7.44  --   --   --  7.49*  --  7.53* 7.51*   PCO2  --  45  --   --   --  41  --  36 42   PO2  --  140*  --   --   --  168*  --  57* 95   HCO3  --  30*  --   --   --  31*  --  30* 33*   O2PER 40 45.0  45.0 50.0 50  < > 60  60  < > 45 100   < > = values in this interval not displayed.    Imaging Studies:  Echo 3/27  The visual ejection fraction is estimated at 30-35%.  There is extensive inferior, inferoseptal, and  inferolateral wall akinesis.  Basal/mid lateral wall hypokinesis  There is moderate to severe septal hypokinesis.  Septal wall motion abnormality may reflect pacemaker activation.  There is a catheter/pacemaker lead seen in the right ventricle.  The right ventricle is mildly dilated.  Severely decreased right ventricular systolic function  There is no pericardial effusion.  The rhythm was paced.  Compared to the prior study, the LVEF appears somewhat decreased. Septal wall  motion abnormality larger- may reflect, in part, that patient in sinus tach on  prior study, and ventricular paced now. No hemodynamically significant  valvular abnormalities on 2D or color flow imaging     CT chest/abdomen 3/27   IMPRESSION:   1. Small mediastinal hemorrhage, small bilateral pleural effusions  which may be hemorrhagic. Small intraperitoneal hemorrhage. Minimal  pericardial hemorrhage.  2. Minimal pneumoperitoneum in the left paracolic gutter, of unknown  significance. No pneumatosis as clinically questioned.  3. IABP slightly low in position.  4. Bilateral pulmonary dependent consolidations represent compression  atelectasis, however differentials include aspiration.     Cath 3/26 [facility]  SUMMARY:   --Inferior STEMI w/ late presentation. Culprit dictated by complete  heart block, and cardiogenic shock. Emergent echo in the Cath Lab also  shows significant RV dysfunction.  --Three vessel coronary artery disease with diffuse coronary disease  out to the distal vessels  --Successful POBA of the prox and mid-RCA. Stent was not placed, in  anticipation he may need to be considered for bypass surgery in the  near future  --Insertion of a temporary pacemaker for bradycardia (AVB) and  hypotension  --Insertion of a IABP  --Upper GI bleed consistent with [place]     [facility] 3/27     CT head 3/28: normal      Echo 3/29  Interpretation Summary  Limited study. Ischemic CM. Moderately (EF 30-35%) reduced left  ventricular  function is present. Traced at 32%.  Posterior wall akinesis is present. Lateral wall akinesis is present. Inferior  wall akinesis is present.  Right ventricular function, chamber size, wall motion, and thickness are  normal.  Dilation of the inferior vena cava is present with abnormal respiratory  variation in diameter.     Compared to prior study, RV fxn is improved.     Echo 3/31  Left Ventricle  The Ejection Fraction is estimated at 50-55%. Inferior,posterior,lateral,basal  septal wall akinesis as reported before. No change.     CORONARY ANGIOGRAM 4/1:   1. Both coronary arteries arise from their respective cusps.  2. Right dominant.  3. LM has mild luminal irregularities.   4. LAD supplies the apex along with the RCA (type 2 LAD) and gives rise to septal perforators and a large and branching D1. There are widely patent stents extending from the proximal to mid LAD. The dLAD has mild to moderate disease to 30% stenosis. The D1 is jailed by the LAD stents, but has EBER 3 flow with disease to 30% stenosis.   5. The small ramus is no longer present.  6. LCX is occluded at the ostium.   7. RCA gives rise to PL branches and supplies PDA. There are widely patent stents extending from the proximal to mid RCA. The remainder of the mRCA has disease to 50% stenosis and the dRCA has disease to 10% stenosis. The RPDA has a widely patent stent and the remainder of the artery has mild disease to 10% stenosis. The RPLA has small vessels with diffuse disease including a distal branch occlusion. The RPLA supplies some small collateral branches to the dLCx.      COMPLICATIONS:  1. None      SUMMARY:   1. Cardiogenic shock following an inferior STEMI.  2. The LCx re-occlusion is not surprising as the recently revascularized LCx  had poor outflow and the revascularized portion did not supply a large territory (limited to no flow was present distal to the stented segment immediately following stenting). Repeat  revascularization of the LCx would result in the same outcome of repeat closure and also risk embolizing thrombus from the LCx into the LAD so no treatment of the occluded LCx was performed.   3. Three vessel coronary artery disease with patent LAD and RCA stents and an occluded LCx.     Echo 4/3  Left ventricular size is normal.  The Ejection Fraction is estimated at 35-40%.  Inferior wall akinesis is present.  Lateral wall akinesis is present.  Posterior wall akinesis is present.  Right ventricular function, chamber size, wall motion, and thickness are  normal.  Moderate mitral insufficiency is present.  Moderate tricuspid insufficiency is present.  Right ventricular systolic pressure is 47mmHg above the right atrial pressure.  The inferior vena cava is normal.     Echo 4/5  Moderately (EF 35-40%) reduced left ventricular function is present. Traced at  40%.  Moderate mitral insufficiency is present.  The cause of the mitral insufficiency appears to be restricted posterior  leaflet from posterior MI. No mitral leaflet pathology seen.  Dilation of the inferior vena cava is present with abnormal respiratory  variation in diameter.     CT abd without contrast 4/4  IMPRESSION:   1. No evidence of ischemic bowel, obstruction, or intra-abdominal  infection.  2. Bibasilar patchy pulmonary opacities likely represent combination  of aspiration and atelectasis.  3. Small amount of simple free fluid within the lower abdomen.  4. Small left retroperitoneal hematoma along the iliac vasculature  likely postprocedural.   5. Unchanged size of bilateral pleural effusions, which now consist of  simple fluid.    Assessment and Plan  51 year old man presented with cardiogenic shock from inferior STEMI s/p RCA aspiration thrombectomy and POBA on 3/26 at [facility] s/p IABP and initiation of Dopamine, also during procedure, CHB s/p temp pacer, emesis/hematmesis and altered mental status s/p intubation transferred here for consideration  of mechanical support on 3/27. Had PCI to RCA, LAD and LCx (on ASA and plavix) started on epinephrine in addition to dopamine, post procedure developed distributive shock picture from infection (aspiration pneumonia? Sputum cx growing staph aureus, on vanco and zosyn) vs post-MI SIRS response. Currently in cardiogenic shock with IABP in situ    Card  # Cardiogenic shock 4/3- from underlying 3v CAD-has had high SVR and low CI, complicated by ischemic MR  # Due to inferior wall STEMI. S/P 7 stents to LAD, circ, RCA (angiogram report pending). Now with IABP, temporary pacemaker after 3rd deg heart block in cath lab at [facility] on 3/26   # Small pericardial hemorrhage   # Remains unclear why we are having a hard time weaning him off of IABP in the setting of EF of 3-35%. Microvascular disease is a possibility.  - IABP in place  - Hold off on isordil for now  - Cortisol normal  - Plan to keep CVP < 10  - f/u blood cultures  - f/u BAL results  - Closely monitor hemodynamics (currently cardiogenic shock, but given recent fever and concern for aspiration pna, could potentially develop a septic picture overnight)  - Will likely need a MV intervention (MVR v MitraClip) when stable enough (given severe MR on echo, and his reliance on afterload reduction)    # Neuro  - intubated, sedated         Respiratory  - Intubated in the setting of worsening hypoxia on 4/25  - Possible causes: aspiration PNA vs PE vs volume overload   - Diuresing to CVP < 10, already on heparin inf for IABP, and being treated with Vanc and Pip/tazo  - Pulmonary consulted,appreciate recs  - f/u BAL results         # ID  - UA 4/15 with cloudy urine, 50 WBCs, many bacteria, no nitrates. UCx grew E coli - treated with CFTX (sensitive)  - Fever on 4/20 and single spike 100.2 overnight with blood cultures in process (no growth thus far), PICC and SGC removed, started on Vanc and pip/tazo  - 4/25 new fever and concern for aspiration pneumonia, continuing  Vanc/Meropenem. ID following         # Endocrine  T2DM: HA1C 7.5 on admission.  - Insulin gtt per nurising protocol with hypoglycemia precautions.   - Switch to SQ Lantus 4/22      # GI  - Had several maroon BMs since 4/11, Hb overall dropped by a 1.5 grams or so, heparin inf was held and GI consulted  - Colonoscopy 4/17 showed a rectal ulcer but no intervenable lesions  - Will continue to monitor Hb, back on heparin inf  - Protonix 40 PO BID         # Renal/  - Daily Cr  - Avoid nephrotoxins as able  - Plan to keep CVP < 10     -Hypernatremia  - Resolved. Stopped his D5W, only getting FWF's.        FEN: feeding tube, and dysphagia level II  Code: FULL  PPx: PPI 40mg BID, senna PRN  Dispo: pending stability    LINES:  - SC IABP  4/19  - LUE PICC   4/20  - Chavis catheter  4/20        Plan of care discussed with Dr. Alvina Loving MD  Cardiovascular Disease Fellow  Pager: [phone]

## 2017-04-27 NOTE — PROGRESS NOTES
General Infectious Disease Service Progress Note       Patient:  Luke Henao   Date of birth 1965, Medical record number [id number]  Date of Visit:  04/27/2017  Date of Admission: 3/27/2017           Assessment and Recommendations:   RECOMMENDATIONS:    Continue meropenem and vancomycin for now till BAL results are available.       ASSESSMENT:  Luke Henao is a 51 year old Portuguese man with history of diabetes, with cardiogenic shock from an inferior STEMI, s/p RCA aspiration thrombectomy and multiple stent placements.   He is inotrope and IABP-dependent, and spiking intermittent fevers.     1. SIRS vs sepsis  2. Cardiogenic shock vs septic shock vs both     This patient continues to spike fever despite broad spectrum ABx of zosyn and vancomycin so they were changed to meropenem and vancomycin pending BAL results.  The patient had RUL infiltrate then was complicated by bilateral pulmonary edema that improved by diuresis.   But later on the patient developed bilateral infiltrate with fever and leukocytosis.   He needed intubation and aspirated during intubation.     The chest CT is suggestive of pulmonary edema but can not rule out infection.     May still be in cardiogenic shock, whether he's also in septic shock is not clear, but definitely should be considered in a patient with SIRS.     Will follow on repeat Bcx.   Will follow on BAL.      Romi Li   Pager: [phone]  4/27/2017            Interim History:   Intubation 4/25/2017.   Bronchoscopy 4/26/2017.     Remains intubated but arousible.     On milrinone, other vasopressors are on hold.     Exposure History obtained 4/25/2017 from family.   Originally from [place].   Also lived in the [facility].   [place] to [place] many [place] ago but visits [place] frequently, last visit was a year ago.   Lives in [place], [place]. Also lived in [place], [place] in the past.   No pets.   No known history of TB exposure, whether he has positive PPD is not  clear.          History of Present Illness:   Luke Henao is a 51 year old [place]divian man with history of diabetes, who presented to [facility] via EMS on 3/28/17. He complained initially of a 2 day history of flu-like symptoms, with nausea and vomiting. On EMS arrival, he was noted to by hypotensive, and EKG was concerning for inferior STEMI. He went to the cath lab urgently, found to have severe LAD disease and LCx stenosis. He underwent aspiration thrombectomy and POBA of 2 sites, felt to need CABG, though thought to be a poor surgical candidate. While in the cath lab, was noted to be persistently hypotensive, and had an episode of hematemesis. He also developed complete heart block with bradycardia, and a temporary pacemaker and IABP were placed. He was started on dopamine and then transferred to [facility] for further management. On arrival, he underwent PCI with 7 stents placed. Since then, he has remained hypotensive and IABP-dependent, now on inotropes with replacement of a subclavian IABP.     The infectious disease team has been consulted for assistance in managing antimicrobials given intermittent fevers. Hospital course notable for fevers on 3/27 and 3/28 (with sputum culture growing Staph aureus and Haemophilus influenzae), thought secondary to possible aspiration pneumonia, and treated with vancomycin + zosyn, narrowed to Unasyn alone 3/30 through 4/3, then broadened again to vancomycin + Zosyn from 4/3 through 4/4), with Zosyn alone from 4/4 through 4/7. He was fever free and off all antibiotics from 4/7 through 4/15. Urine culture from 4/12 grew 10-50K colonies of E.coli. He spiked a low-grade fever on 4/15 and was started back on vancomycin + Zosyn, narrowed to ceftriaxone alone on 4/16 (to cover the E.coli UTI) through 4/19. Subclavian IABP was placed on 4/19, though he spiked a high fever to 102.2 and was broadened to vancomycin + zosyn again. PICC line replaced today, and [place] Benjy catheter  removed.    Today, the patient reports his biggest concern is pain in the RUE surrounding the PICC site. A new PICC has been placed in his LUE, though the old line remained in place until all IV access was secured. He denies nausea, vomiting, abdominal pain, constipation, diarrhea, headaches, SOB, chest pain or cough. No rashes or concerning lesions. He denies any dysuria, though urinary catheter remains in place.       Notable Microbiology:    Sputum (endotracheal) sample from 3/28 with Staph aureus and Haemophilus influenzae    Influenza A/B negative on 3/28    Sputum (endotracheal) sample from 3/29 with Staph aureus    C.diff PCR negative on 4/7    Urine culture from 4/12 with 10-50K colonies Escherichia coli (penicillin and fluoroquinolone-resistant)    Urine culture from 4/15 with >100K colonies E. Coli (penicillin and fluoroquinolone-resistant)    Multiple blood cultures this hospitalization remain NGTD           Review of Systems:   A full 10-point review of systems was obtained and is negative except for the mentioned in the HPI above.             Current Medications (antimicrobials listed in bold):       meropenem  1 g Intravenous Q8H     vancomycin (VANCOCIN) IV  1,250 mg Intravenous Q18H     aspirin  324 mg Oral or Feeding Tube Daily     heparin lock flush  5-10 mL Intracatheter Q24H     QUEtiapine  12.5 mg Oral At Bedtime     pantoprazole  40 mg Per Feeding Tube BID     zinc sulfate (20 mg [place]. Zn/mL)  220 mg Per Feeding Tube Daily     vitamin A-D & C drops  7 mL Per Feeding Tube BID     atorvastatin  40 mg Oral or Feeding Tube Daily at 8 pm     pneumococcal vaccine  0.5 mL Intramuscular Once     sodium chloride (PF)  3 mL Intracatheter Q8H     multivitamins with minerals  15 mL Per Feeding Tube Daily            Allergies:   No Known Allergies         Physical Exam:   Ranges for vital signs:  Temp:  [98.1  F (36.7  C)-101.7  F (38.7  C)] 99.7  F (37.6  C)  Heart Rate:  [105-130] 120  Resp:  [12] 12  BP:  (77)/(54) 77/54  MAP:  [58 mmHg-95 mmHg] 87 mmHg  Arterial Line BP: ()/(47-68) 111/64  FiO2 (%):  [40 %-50 %] 40 %  SpO2:  [98 %-100 %] 100 %    Physical Examination:  GENERAL: Intubated, follows simple commands.     HEENT:  Head is normocephalic, atraumatic.    LUNGS:  Coarse BS bilaterally.   CARDIOVASCULAR:  Tachycardic, regular rhythm.  ABDOMEN:  Soft, non-tender. Distended.   : Chavis in place draining clear yellow urine.  SKIN:  No acute rashes. Site of new IABP in the right chest wall and the Site of old IABP in right groin are clear and dry, with no erythema or purulence noted. Lines are in place without any surrounding erythema or exudate.             Laboratory Data:     Inflammatory Markers    Recent Labs   Lab Test  04/08/17   0425   CRP  18.0*       Hematology Studies    Recent Labs   Lab Test  04/27/17   0433  04/26/17   0400  04/25/17   0154  04/24/17   0414  04/23/17   0525  04/22/17   1813   03/27/17   0551   03/26/17   2253  03/26/17   1800   WBC  15.5*  14.9*  13.1*  9.5  7.4  7.9   < >  15.9*   < >  18.6*  14.3*   ANEU   --   12.1*   --    --    --    --    --   10.7*   --   15.4*  12.1*   AEOS   --   0.0   --    --    --    --    --   0.0   --   0.0  0.0   HGB  7.6*  7.9*  8.2*  7.6*  7.5*  7.8*   < >  13.1*   < >  13.6  15.7   MCV  100  99  100  99  100  99   < >  90   < >  91  90   PLT  355  317  316  278  268  237   < >  177   < >  196  204    < > = values in this interval not displayed.       Immune Globulin Studies    Recent Labs   Lab Test  04/18/17   2349   IGG  1610   IGM  63   IGA  394*       Metabolic Studies     Recent Labs   Lab Test  04/27/17   0433  04/26/17   2039  04/26/17   1607  04/26/17   0400  04/25/17   1651  04/25/17   1244   NA  139   --   140  142  138  136   POTASSIUM  4.3  4.5  3.8  3.4  3.9  3.6   CHLORIDE  102   --   103  101  98  99   CO2  31   --   31  27  32  30   BUN  30   --   31*  27  24  21   CR  1.28*   --   1.60*  1.36*  1.32*  1.08   GFRESTIMATED  60*    --   46*  56*  57*  72       Hepatic Studies    Recent Labs   Lab Test  04/27/17   0433  04/26/17   0400  04/25/17   0154  04/24/17   0414  04/23/17   0525  04/22/17   0350   BILITOTAL  0.4  0.5  0.4  0.6  1.0  0.5   ALKPHOS  75  66  69  62  68  63   ALBUMIN  1.8*  1.8*  2.0*  1.9*  2.0*  1.9*   AST  26  23  22  24  35  49*   ALT  24  24  36  38  49  49       Thyroid Studies    Recent Labs   Lab Test  04/14/17   1751  04/08/17   0425   TSH  2.54  1.33       Microbiology:  Culture Micro   Date Value Ref Range Status   04/26/2017 No growth after 14 hours  Final   04/26/2017 No growth after 14 hours  Final   04/26/2017 Culture negative after 20 hours  Final   04/26/2017 Culture negative < 24 hours, reincubate  Final   04/25/2017 No growth after 2 days  Final   04/25/2017 No growth after 2 days  Final   04/25/2017 No growth after 2 days  Final   04/23/2017 No growth after 4 days  Final   04/20/2017 No growth  Final   04/20/2017   Final    Canceled, Test credited Quantity not sufficient Notification of test cancellation   was given to ERIC ROSENBAUM ON UU4E, REORDERED FOR RN TO RECOLLECT     04/20/2017 No growth  Final   04/19/2017 No growth  Final   04/19/2017 No growth  Final   04/19/2017 No growth  Final   04/15/2017 No growth  Final   04/15/2017 >100,000 colonies/mL Escherichia coli (A)  Final   04/15/2017 Canceled, Test credited Duplicate request  Final   04/12/2017 10,000 to 50,000 colonies/mL Escherichia coli (A)  Final   04/12/2017 No growth  Final   04/12/2017 No growth  Final   04/05/2017 No growth  Final   04/03/2017 No growth  Final   04/03/2017 No growth  Final   04/03/2017 No growth  Final   04/03/2017 No growth  Final   03/29/2017 (A)  Final    Moderate growth Staphylococcus aureus Susceptibility testing done on previous   specimen     03/29/2017 No growth  Final   03/28/2017 No growth  Final   03/28/2017 (A)  Final    Heavy growth Staphylococcus aureus  Heavy growth Haemophilus influenzae Beta lactamase  negative Beta-lactamase   negative Haemophilus influenzae are usually susceptible to ampicillin,   amoxacillin/clavulanic acid, levofloxacin, and 3rd generation cephalosporins,   such as ceftriaxone.  Light growth Normal brianda     03/27/2017 No growth  Final   03/26/2017 No growth  Final   03/26/2017 No growth  Final       Urine Studies    Recent Labs   Lab Test  04/20/17   1711  04/19/17   2214  04/15/17   0135  04/12/17   1852  04/08/17   0705   LEUKEST  Large*  Large*  Large*  Moderate*  Negative   WBCU  73*  59*  50*  5*  1       Vancomycin Levels    Recent Labs   Lab Test  04/23/17   0525  04/21/17   1118  04/07/17   0403   VANCOMYCIN  16.7  20.6  14.1       Hepatitis B Testing   Recent Labs   Lab Test  04/15/17   0350   HBCAB  Reactive   A reactive result indicates acute, chronic or past/resolved hepatitis B   infection.  *   HEPBANG  Nonreactive     Hepatitis C Testing     Hepatitis C Antibody   Date Value Ref Range Status   04/15/2017  NR Final    Nonreactive   Assay performance characteristics have not been established for newborns,   infants, and children                Relevant Imaging:   CXR 4/24/2017 REVIEWED BY MYSELF  IMPRESSION:   1. Stable intra-aortic balloon pump marker near the aortic arch, just  above the level of the suzanne.   2. Increased patchy pulmonary opacities, right more than left.  Findings suggestive of worsening infection or pulmonary edema.    Doppler 4/23/2017 of bilateral UE and LE  IMPRESSION:  1. Small amount of peripheral nonocclusive thrombus in the left  internal jugular vein.  2. No evidence of deep venous thrombosis in the right upper  extremity.  IMPRESSION:  1. Small amount of chronic appearing, nonocclusive thrombus in the  left common femoral vein.  2. No DVT in the right lower extremity.      CT chest 4/26/2017 reviewed by myself   IMPRESSION:   Peribronchovascular opacities mixed with some septal lines in the mid  and upper lobes with bilateral pleural effusions and  lower lobe  groundglass, overall most consistent with pulmonary edema. Overlying  infection or hemorrhage cannot be ruled out.    CT CHEST/ABDOMEN/PELVIS, 3/27/17:  IMPRESSION:   1. Small mediastinal hemorrhage, small bilateral pleural effusions  which may be hemorrhagic. Small intraperitoneal hemorrhage. Minimal  pericardial hemorrhage.  2. Minimal pneumoperitoneum in the left paracolic gutter, of unknown  significance. No pneumatosis as clinically questioned.  3. IABP slightly low in position.  4. Bilateral pulmonary dependent consolidations represent compression  atelectasis, however differentials include aspiration      CT ABDOMEN/PELVIS W/OUT CONTRAST, 4/4/17:  IMPRESSION:   1. No evidence of ischemic bowel, obstruction, or intra-abdominal  infection.  2. Bibasilar patchy pulmonary opacities likely represent combination  of aspiration and atelectasis.  3. Small amount of simple free fluid within the lower abdomen.  4. Small left retroperitoneal hematoma along the iliac vasculature  likely postprocedural.   5. Unchanged size of bilateral pleural effusions, which now consist of  simple fluid.    CT ABDOMEN PELVIS W/O CONTRAST, 4/11/2017 6:39 PM  COMPARISON: CT on 4/4/2017.  HISTORY: abdominal pain and maroon BMs.  Impression:  1. Stable small retroperitoneal hemorrhage adjacent to left iliac  vessels in the left low pelvis.   2. Slightly improved small bilateral pleural effusions and associated  bibasilar opacities, likely representing pulmonary edema.  3. Previously seen intraperitoneal fluid has resolved.  4. Intra-aortic balloon pump in the descending thoracic aorta,  superior tip out of view.

## 2017-04-27 NOTE — PLAN OF CARE
Problem: Goal Outcome Summary  Goal: Goal Outcome Summary  1) pt will be hemodynamically stable  2) pt will tolerate weaning of IABP  3) pain will be adequately controlled     Tmax 101.3, Sinus tachy 110-120's, CVP 14, IABP at 1:2, MAPs>65, bumex given x1, dawkins in situ, rectal tube insitu with small amount stool noted, dopamine weaned off, insulin titrated to glucoses, tube feeds at goal via NJ tube, fentanyl at 50 mcg, versed remains off, morphine ointment for pain as per orders, pt more awake, obeys commands as day progressed; attempted pressure support trial but pt resp rate dropped, Pt's daughter at bedside all shift, pt's wife updated via  on pt status and plan of care; Continue to monitor vitals, respiratory status, and ability to wean from vent, pain control, labs as per plan of care, see also flow sheets for details

## 2017-04-27 NOTE — PLAN OF CARE
Problem: Goal Outcome Summary  Goal: Goal Outcome Summary  1) pt will be hemodynamically stable  2) pt will tolerate weaning of IABP  3) pain will be adequately controlled     Outcome: No Change  Patient remains intubated with vent settings: CMV- FiO2 40%, tV 450, Rate 12, Peep 8. Lung sounds coarse, diminished. -130s. MAPs >65. IABP in place (see docflow). Tmax 100.9F overnight. Pt opens eyes to stimulation, moves all extremities, but does not follow commands. Currently has Heparin, Fentanyl, Insulin and Dopamine drips infusing. Versed shut off at 0600 to assess neuro status. NJ w/ TF at 50ml/hr (goal). OG to suction. Chavis intact with 20-70cc/hr output. Rectal tube intact with minimal output. Hemodynamic numbers: CVP 16-18, CO 3.4-4.2, CI 2.1-2.6, -1447, SVO2 45-55. Bilat soft wrist restraints on for patient safety. Will continue to monitor and update MD with any changes.

## 2017-04-28 ENCOUNTER — APPOINTMENT (OUTPATIENT)
Dept: OCCUPATIONAL THERAPY | Facility: CLINIC | Age: 52
DRG: 228 | End: 2017-04-28
Attending: INTERNAL MEDICINE
Payer: COMMERCIAL

## 2017-04-28 ENCOUNTER — APPOINTMENT (OUTPATIENT)
Dept: GENERAL RADIOLOGY | Facility: CLINIC | Age: 52
DRG: 228 | End: 2017-04-28
Attending: INTERNAL MEDICINE
Payer: COMMERCIAL

## 2017-04-28 ENCOUNTER — APPOINTMENT (OUTPATIENT)
Dept: CT IMAGING | Facility: CLINIC | Age: 52
DRG: 228 | End: 2017-04-28
Attending: STUDENT IN AN ORGANIZED HEALTH CARE EDUCATION/TRAINING PROGRAM
Payer: COMMERCIAL

## 2017-04-28 ENCOUNTER — APPOINTMENT (OUTPATIENT)
Dept: GENERAL RADIOLOGY | Facility: CLINIC | Age: 52
DRG: 228 | End: 2017-04-28
Payer: COMMERCIAL

## 2017-04-28 DIAGNOSIS — I34.0 MITRAL VALVE INSUFFICIENCY, UNSPECIFIED ETIOLOGY: Primary | ICD-10-CM

## 2017-04-28 DIAGNOSIS — I25.10 CORONARY ARTERY DISEASE INVOLVING NATIVE CORONARY ARTERY OF NATIVE HEART WITHOUT ANGINA PECTORIS: ICD-10-CM

## 2017-04-28 LAB
ABO + RH BLD: NORMAL
ABO + RH BLD: NORMAL
ACID FAST STN SPEC QL: NORMAL
ALBUMIN SERPL-MCNC: 1.8 G/DL (ref 3.4–5)
ALP SERPL-CCNC: 86 U/L (ref 40–150)
ALT SERPL W P-5'-P-CCNC: 24 U/L (ref 0–70)
ANION GAP SERPL CALCULATED.3IONS-SCNC: 6 MMOL/L (ref 3–14)
ANION GAP SERPL CALCULATED.3IONS-SCNC: 9 MMOL/L (ref 3–14)
AST SERPL W P-5'-P-CCNC: 29 U/L (ref 0–45)
BACTERIA SPEC CULT: ABNORMAL
BACTERIA SPEC CULT: NO GROWTH
BASE EXCESS BLDA CALC-SCNC: 6.4 MMOL/L
BASE EXCESS BLDA CALC-SCNC: 7.3 MMOL/L
BASE EXCESS BLDV CALC-SCNC: 7.3 MMOL/L
BASE EXCESS BLDV CALC-SCNC: 7.9 MMOL/L
BASE EXCESS BLDV CALC-SCNC: 8.3 MMOL/L
BASE EXCESS BLDV CALC-SCNC: 8.9 MMOL/L
BASE EXCESS BLDV CALC-SCNC: 9.2 MMOL/L
BASE EXCESS BLDV CALC-SCNC: 9.5 MMOL/L
BILIRUB DIRECT SERPL-MCNC: 0.1 MG/DL (ref 0–0.2)
BILIRUB SERPL-MCNC: 0.5 MG/DL (ref 0.2–1.3)
BLD GP AB SCN SERPL QL: NORMAL
BLOOD BANK CMNT PATIENT-IMP: NORMAL
BUN SERPL-MCNC: 33 MG/DL (ref 7–30)
BUN SERPL-MCNC: 34 MG/DL (ref 7–30)
CA-I BLD-MCNC: 4.4 MG/DL (ref 4.4–5.2)
CALCIUM SERPL-MCNC: 7.6 MG/DL (ref 8.5–10.1)
CALCIUM SERPL-MCNC: 8.1 MG/DL (ref 8.5–10.1)
CHLORIDE SERPL-SCNC: 103 MMOL/L (ref 94–109)
CHLORIDE SERPL-SCNC: 104 MMOL/L (ref 94–109)
CO2 SERPL-SCNC: 29 MMOL/L (ref 20–32)
CO2 SERPL-SCNC: 34 MMOL/L (ref 20–32)
CREAT SERPL-MCNC: 1.27 MG/DL (ref 0.66–1.25)
CREAT SERPL-MCNC: 1.29 MG/DL (ref 0.66–1.25)
CYANIDE BLD-MCNC: NORMAL UG/DL
ERYTHROCYTE [DISTWIDTH] IN BLOOD BY AUTOMATED COUNT: 19 % (ref 10–15)
GFR SERPL CREATININE-BSD FRML MDRD: 59 ML/MIN/1.7M2
GFR SERPL CREATININE-BSD FRML MDRD: 60 ML/MIN/1.7M2
GLUCOSE BLDC GLUCOMTR-MCNC: 105 MG/DL (ref 70–99)
GLUCOSE BLDC GLUCOMTR-MCNC: 109 MG/DL (ref 70–99)
GLUCOSE BLDC GLUCOMTR-MCNC: 111 MG/DL (ref 70–99)
GLUCOSE BLDC GLUCOMTR-MCNC: 112 MG/DL (ref 70–99)
GLUCOSE BLDC GLUCOMTR-MCNC: 115 MG/DL (ref 70–99)
GLUCOSE BLDC GLUCOMTR-MCNC: 118 MG/DL (ref 70–99)
GLUCOSE BLDC GLUCOMTR-MCNC: 123 MG/DL (ref 70–99)
GLUCOSE BLDC GLUCOMTR-MCNC: 131 MG/DL (ref 70–99)
GLUCOSE BLDC GLUCOMTR-MCNC: 139 MG/DL (ref 70–99)
GLUCOSE BLDC GLUCOMTR-MCNC: 140 MG/DL (ref 70–99)
GLUCOSE BLDC GLUCOMTR-MCNC: 145 MG/DL (ref 70–99)
GLUCOSE BLDC GLUCOMTR-MCNC: 149 MG/DL (ref 70–99)
GLUCOSE BLDC GLUCOMTR-MCNC: 151 MG/DL (ref 70–99)
GLUCOSE BLDC GLUCOMTR-MCNC: 154 MG/DL (ref 70–99)
GLUCOSE BLDC GLUCOMTR-MCNC: 156 MG/DL (ref 70–99)
GLUCOSE BLDC GLUCOMTR-MCNC: 85 MG/DL (ref 70–99)
GLUCOSE SERPL-MCNC: 150 MG/DL (ref 70–99)
GLUCOSE SERPL-MCNC: 86 MG/DL (ref 70–99)
HCO3 BLD-SCNC: 31 MMOL/L (ref 21–28)
HCO3 BLD-SCNC: 31 MMOL/L (ref 21–28)
HCO3 BLDV-SCNC: 33 MMOL/L (ref 21–28)
HCO3 BLDV-SCNC: 33 MMOL/L (ref 21–28)
HCO3 BLDV-SCNC: 34 MMOL/L (ref 21–28)
HCT VFR BLD AUTO: 26.3 % (ref 40–53)
HGB BLD-MCNC: 7.6 G/DL (ref 13.3–17.7)
LMWH PPP CHRO-ACNC: 0.34 IU/ML
MAGNESIUM SERPL-MCNC: 2.6 MG/DL (ref 1.6–2.3)
MAGNESIUM SERPL-MCNC: 2.6 MG/DL (ref 1.6–2.3)
MCH RBC QN AUTO: 29.5 PG (ref 26.5–33)
MCHC RBC AUTO-ENTMCNC: 28.9 G/DL (ref 31.5–36.5)
MCV RBC AUTO: 102 FL (ref 78–100)
MICRO REPORT STATUS: ABNORMAL
MICRO REPORT STATUS: NORMAL
MICRO REPORT STATUS: NORMAL
O2/TOTAL GAS SETTING VFR VENT: 40 %
O2/TOTAL GAS SETTING VFR VENT: ABNORMAL %
O2/TOTAL GAS SETTING VFR VENT: ABNORMAL %
OXYHGB MFR BLD: 96 % (ref 92–100)
OXYHGB MFR BLD: 97 % (ref 92–100)
OXYHGB MFR BLDV: 40 %
OXYHGB MFR BLDV: 41 %
OXYHGB MFR BLDV: 43 %
OXYHGB MFR BLDV: 44 %
OXYHGB MFR BLDV: 46 %
OXYHGB MFR BLDV: 50 %
PCO2 BLD: 40 MM HG (ref 35–45)
PCO2 BLD: 44 MM HG (ref 35–45)
PCO2 BLDV: 47 MM HG (ref 40–50)
PCO2 BLDV: 51 MM HG (ref 40–50)
PCO2 BLDV: 52 MM HG (ref 40–50)
PCO2 BLDV: 52 MM HG (ref 40–50)
PCO2 BLDV: 53 MM HG (ref 40–50)
PCO2 BLDV: 53 MM HG (ref 40–50)
PH BLD: 7.45 PH (ref 7.35–7.45)
PH BLD: 7.5 PH (ref 7.35–7.45)
PH BLDV: 7.4 PH (ref 7.32–7.43)
PH BLDV: 7.41 PH (ref 7.32–7.43)
PH BLDV: 7.41 PH (ref 7.32–7.43)
PH BLDV: 7.43 PH (ref 7.32–7.43)
PH BLDV: 7.44 PH (ref 7.32–7.43)
PH BLDV: 7.47 PH (ref 7.32–7.43)
PHOSPHATE SERPL-MCNC: 3.9 MG/DL (ref 2.5–4.5)
PLATELET # BLD AUTO: 405 10E9/L (ref 150–450)
PO2 BLD: 110 MM HG (ref 80–105)
PO2 BLD: 92 MM HG (ref 80–105)
PO2 BLDV: 26 MM HG (ref 25–47)
PO2 BLDV: 27 MM HG (ref 25–47)
PO2 BLDV: 28 MM HG (ref 25–47)
PO2 BLDV: 29 MM HG (ref 25–47)
PO2 BLDV: 29 MM HG (ref 25–47)
PO2 BLDV: 31 MM HG (ref 25–47)
POTASSIUM SERPL-SCNC: 3.9 MMOL/L (ref 3.4–5.3)
POTASSIUM SERPL-SCNC: 4.8 MMOL/L (ref 3.4–5.3)
PROT SERPL-MCNC: 6.6 G/DL (ref 6.8–8.8)
RBC # BLD AUTO: 2.58 10E12/L (ref 4.4–5.9)
SODIUM SERPL-SCNC: 142 MMOL/L (ref 133–144)
SODIUM SERPL-SCNC: 142 MMOL/L (ref 133–144)
SPECIMEN EXP DATE BLD: NORMAL
SPECIMEN SOURCE: ABNORMAL
SPECIMEN SOURCE: NORMAL
SPECIMEN SOURCE: NORMAL
VANCOMYCIN SERPL-MCNC: 19 MG/L
WBC # BLD AUTO: 14.1 10E9/L (ref 4–11)

## 2017-04-28 PROCEDURE — 82805 BLOOD GASES W/O2 SATURATION: CPT | Performed by: INTERNAL MEDICINE

## 2017-04-28 PROCEDURE — 25000132 ZZH RX MED GY IP 250 OP 250 PS 637

## 2017-04-28 PROCEDURE — 25000125 ZZHC RX 250: Performed by: STUDENT IN AN ORGANIZED HEALTH CARE EDUCATION/TRAINING PROGRAM

## 2017-04-28 PROCEDURE — 25000128 H RX IP 250 OP 636

## 2017-04-28 PROCEDURE — 40000281 ZZH STATISTIC TRANSPORT TIME EA 15 MIN

## 2017-04-28 PROCEDURE — 97110 THERAPEUTIC EXERCISES: CPT | Mod: GO | Performed by: OCCUPATIONAL THERAPIST

## 2017-04-28 PROCEDURE — 25000132 ZZH RX MED GY IP 250 OP 250 PS 637: Performed by: INTERNAL MEDICINE

## 2017-04-28 PROCEDURE — 86850 RBC ANTIBODY SCREEN: CPT

## 2017-04-28 PROCEDURE — 84100 ASSAY OF PHOSPHORUS: CPT | Performed by: INTERNAL MEDICINE

## 2017-04-28 PROCEDURE — 87116 MYCOBACTERIA CULTURE: CPT | Performed by: INTERNAL MEDICINE

## 2017-04-28 PROCEDURE — 25000132 ZZH RX MED GY IP 250 OP 250 PS 637: Performed by: STUDENT IN AN ORGANIZED HEALTH CARE EDUCATION/TRAINING PROGRAM

## 2017-04-28 PROCEDURE — 86901 BLOOD TYPING SEROLOGIC RH(D): CPT

## 2017-04-28 PROCEDURE — 25000128 H RX IP 250 OP 636: Performed by: INTERNAL MEDICINE

## 2017-04-28 PROCEDURE — 00000146 ZZHCL STATISTIC GLUCOSE BY METER IP

## 2017-04-28 PROCEDURE — 40000275 ZZH STATISTIC RCP TIME EA 10 MIN

## 2017-04-28 PROCEDURE — 71010 XR CHEST PORT 1 VW: CPT

## 2017-04-28 PROCEDURE — 27210437 ZZH NUTRITION PRODUCT SEMIELEM INTERMED LITER

## 2017-04-28 PROCEDURE — 20000004 ZZH R&B ICU UMMC

## 2017-04-28 PROCEDURE — 94003 VENT MGMT INPAT SUBQ DAY: CPT

## 2017-04-28 PROCEDURE — 74000 XR ABDOMEN PORT F1 VW: CPT

## 2017-04-28 PROCEDURE — 40000133 ZZH STATISTIC OT WARD VISIT: Performed by: OCCUPATIONAL THERAPIST

## 2017-04-28 PROCEDURE — 25000125 ZZHC RX 250: Performed by: INTERNAL MEDICINE

## 2017-04-28 PROCEDURE — 40000076 ZZH STATISTIC IABP MONITORING

## 2017-04-28 PROCEDURE — 86900 BLOOD TYPING SEROLOGIC ABO: CPT

## 2017-04-28 PROCEDURE — 83735 ASSAY OF MAGNESIUM: CPT | Performed by: INTERNAL MEDICINE

## 2017-04-28 PROCEDURE — 99222 1ST HOSP IP/OBS MODERATE 55: CPT | Mod: 25 | Performed by: INTERNAL MEDICINE

## 2017-04-28 PROCEDURE — 80076 HEPATIC FUNCTION PANEL: CPT | Performed by: INTERNAL MEDICINE

## 2017-04-28 PROCEDURE — 40000014 ZZH STATISTIC ARTERIAL MONITORING DAILY

## 2017-04-28 PROCEDURE — 85027 COMPLETE CBC AUTOMATED: CPT | Performed by: INTERNAL MEDICINE

## 2017-04-28 PROCEDURE — S0171 BUMETANIDE 0.5 MG: HCPCS | Performed by: STUDENT IN AN ORGANIZED HEALTH CARE EDUCATION/TRAINING PROGRAM

## 2017-04-28 PROCEDURE — 80202 ASSAY OF VANCOMYCIN: CPT | Performed by: INTERNAL MEDICINE

## 2017-04-28 PROCEDURE — 85520 HEPARIN ASSAY: CPT | Performed by: INTERNAL MEDICINE

## 2017-04-28 PROCEDURE — 25000125 ZZHC RX 250

## 2017-04-28 PROCEDURE — S0171 BUMETANIDE 0.5 MG: HCPCS | Performed by: INTERNAL MEDICINE

## 2017-04-28 PROCEDURE — 70450 CT HEAD/BRAIN W/O DYE: CPT

## 2017-04-28 PROCEDURE — 40000196 ZZH STATISTIC RAPCV CVP MONITORING

## 2017-04-28 PROCEDURE — 87206 SMEAR FLUORESCENT/ACID STAI: CPT | Performed by: INTERNAL MEDICINE

## 2017-04-28 PROCEDURE — S0171 BUMETANIDE 0.5 MG: HCPCS

## 2017-04-28 PROCEDURE — 82330 ASSAY OF CALCIUM: CPT | Performed by: INTERNAL MEDICINE

## 2017-04-28 PROCEDURE — 87015 SPECIMEN INFECT AGNT CONCNTJ: CPT | Performed by: INTERNAL MEDICINE

## 2017-04-28 PROCEDURE — 80048 BASIC METABOLIC PNL TOTAL CA: CPT | Performed by: INTERNAL MEDICINE

## 2017-04-28 RX ORDER — CLOPIDOGREL BISULFATE 75 MG/1
75 TABLET ORAL DAILY
Status: DISCONTINUED | OUTPATIENT
Start: 2017-04-29 | End: 2017-05-12 | Stop reason: HOSPADM

## 2017-04-28 RX ORDER — HYDRALAZINE HYDROCHLORIDE 25 MG/1
25 TABLET, FILM COATED ORAL EVERY 6 HOURS
Status: DISCONTINUED | OUTPATIENT
Start: 2017-04-28 | End: 2017-04-29

## 2017-04-28 RX ORDER — CLOPIDOGREL BISULFATE 75 MG/1
600 TABLET ORAL ONCE
Status: COMPLETED | OUTPATIENT
Start: 2017-04-28 | End: 2017-04-28

## 2017-04-28 RX ORDER — BUMETANIDE 0.25 MG/ML
3 INJECTION INTRAMUSCULAR; INTRAVENOUS ONCE
Status: COMPLETED | OUTPATIENT
Start: 2017-04-28 | End: 2017-04-28

## 2017-04-28 RX ORDER — LIDOCAINE 40 MG/G
CREAM TOPICAL
Status: CANCELLED | OUTPATIENT
Start: 2017-04-28

## 2017-04-28 RX ORDER — BUMETANIDE 0.25 MG/ML
2 INJECTION INTRAMUSCULAR; INTRAVENOUS ONCE
Status: DISCONTINUED | OUTPATIENT
Start: 2017-04-28 | End: 2017-04-28

## 2017-04-28 RX ORDER — BUMETANIDE 0.25 MG/ML
2 INJECTION INTRAMUSCULAR; INTRAVENOUS ONCE
Status: COMPLETED | OUTPATIENT
Start: 2017-04-28 | End: 2017-04-28

## 2017-04-28 RX ORDER — ASPIRIN 81 MG/1
81 TABLET ORAL DAILY
Status: DISCONTINUED | OUTPATIENT
Start: 2017-04-29 | End: 2017-05-01 | Stop reason: DRUGHIGH

## 2017-04-28 RX ORDER — SODIUM CHLORIDE 9 MG/ML
INJECTION, SOLUTION INTRAVENOUS CONTINUOUS
Status: CANCELLED | OUTPATIENT
Start: 2017-04-28

## 2017-04-28 RX ORDER — ASPIRIN 325 MG
325 TABLET ORAL ONCE
Status: COMPLETED | OUTPATIENT
Start: 2017-05-01 | End: 2017-05-01

## 2017-04-28 RX ORDER — ASPIRIN 325 MG
325 TABLET ORAL ONCE
Status: CANCELLED | OUTPATIENT
Start: 2017-04-28 | End: 2017-04-28

## 2017-04-28 RX ADMIN — VANCOMYCIN HYDROCHLORIDE 1250 MG: 10 INJECTION, POWDER, LYOPHILIZED, FOR SOLUTION INTRAVENOUS at 10:14

## 2017-04-28 RX ADMIN — POTASSIUM CHLORIDE 20 MEQ: 29.8 INJECTION, SOLUTION INTRAVENOUS at 18:22

## 2017-04-28 RX ADMIN — HYDRALAZINE HYDROCHLORIDE 25 MG: 25 TABLET ORAL at 21:57

## 2017-04-28 RX ADMIN — BUMETANIDE 2 MG: 0.25 INJECTION, SOLUTION INTRAMUSCULAR; INTRAVENOUS at 13:33

## 2017-04-28 RX ADMIN — MULTIVIT AND MINERALS-FERROUS GLUCONATE 9 MG IRON/15 ML ORAL LIQUID 15 ML: at 08:11

## 2017-04-28 RX ADMIN — PANTOPRAZOLE SODIUM 40 MG: 40 TABLET, DELAYED RELEASE ORAL at 08:11

## 2017-04-28 RX ADMIN — ACETAMINOPHEN 650 MG: 325 TABLET, FILM COATED ORAL at 04:16

## 2017-04-28 RX ADMIN — Medication: at 16:16

## 2017-04-28 RX ADMIN — PANTOPRAZOLE SODIUM 40 MG: 40 TABLET, DELAYED RELEASE ORAL at 21:56

## 2017-04-28 RX ADMIN — BUMETANIDE 3 MG: 0.25 INJECTION INTRAMUSCULAR; INTRAVENOUS at 05:51

## 2017-04-28 RX ADMIN — ACETAMINOPHEN 650 MG: 325 TABLET, FILM COATED ORAL at 13:52

## 2017-04-28 RX ADMIN — Medication 7 ML: at 08:12

## 2017-04-28 RX ADMIN — ATORVASTATIN CALCIUM 40 MG: 40 TABLET, FILM COATED ORAL at 21:56

## 2017-04-28 RX ADMIN — Medication 12.5 MG: at 10:18

## 2017-04-28 RX ADMIN — ASPIRIN 81 MG CHEWABLE TABLET 324 MG: 81 TABLET CHEWABLE at 08:12

## 2017-04-28 RX ADMIN — HUMAN INSULIN 4 UNITS/HR: 100 INJECTION, SOLUTION SUBCUTANEOUS at 02:11

## 2017-04-28 RX ADMIN — HEPARIN SODIUM 850 UNITS/HR: 10000 INJECTION, SOLUTION INTRAVENOUS at 00:07

## 2017-04-28 RX ADMIN — MEROPENEM 1 G: 1 INJECTION, POWDER, FOR SOLUTION INTRAVENOUS at 21:14

## 2017-04-28 RX ADMIN — Medication 12.5 MG: at 21:56

## 2017-04-28 RX ADMIN — Medication 12.5 MG: at 13:52

## 2017-04-28 RX ADMIN — MEROPENEM 1 G: 1 INJECTION, POWDER, FOR SOLUTION INTRAVENOUS at 04:16

## 2017-04-28 RX ADMIN — Medication: at 08:24

## 2017-04-28 RX ADMIN — CLOPIDOGREL 600 MG: 75 TABLET, FILM COATED ORAL at 17:31

## 2017-04-28 RX ADMIN — MEROPENEM 1 G: 1 INJECTION, POWDER, FOR SOLUTION INTRAVENOUS at 12:34

## 2017-04-28 RX ADMIN — HUMAN INSULIN 3 UNITS/HR: 100 INJECTION, SOLUTION SUBCUTANEOUS at 11:46

## 2017-04-28 RX ADMIN — BUMETANIDE 2 MG: 0.25 INJECTION INTRAMUSCULAR; INTRAVENOUS at 17:41

## 2017-04-28 NOTE — PROGRESS NOTES
General Infectious Disease Service Progress Note       Patient:  Luke Henao   Date of birth 1965, Medical record number [id number]  Date of Visit:  04/28/2017  Date of Admission: 3/27/2017           Assessment and Recommendations:   RECOMMENDATIONS:    Continue meropenem and vancomycin for now till BAL results are available.     If by 4/29/2017 there is no MRSA growth we should DC vancomycin.     AFB smear and cx again today (ordered for you).       ASSESSMENT:  Luke Henao is a 51 year old Portuguese man with history of diabetes, with cardiogenic shock from an inferior STEMI, s/p RCA aspiration thrombectomy and multiple stent placements.   He is inotrope and IABP-dependent, and spiking intermittent fevers.     1. SIRS vs sepsis  2. Cardiogenic shock vs septic shock vs both  3. Respiratory failure due to either pneumonia (HAP)/edema/ARDS or combination    The patient had RUL infiltrate with fever and was started on zosyn and vancomycin on 4/19/2017.   Then suffered from bilateral pulmonary edema that improved by diuresis. But later on the patient developed worsening bilateral infiltrate with fever and leukocytosis.   He needed intubation and aspirated during intubation on 4/25/2017.   This patient continued to spike fever despite broad spectrum ABx of zosyn and vancomycin so they were changed to meropenem and vancomycin on 4/25/2017 pending BAL results (done 4/26/2017).    The chest CT is suggestive of pulmonary edema but can not rule out infection.     May still be in cardiogenic shock, whether he's also in septic shock is not clear, but definitely should be considered in a patient with SIRS.     The patient is from [facility], we are checking AFB in respiratory samples.   The patient is not typical immunocompromised patient to suspect fungal infection, also these pulmonary infiltrate occurred while inpatient. He is currently improving with decreasing fever, decreasing WBC, and maybe extubated today; so I  am not going to do further workup for fungal infection at this point.     Will follow on repeat Bcx.   Will follow on BAL.      Discussed with ICU and CARD2 teams.  Dr. Paul is available for questions over the weekend, also Dr. Paul will assume the patient's care on Monday.      Romi Li   Pager: [phone]  4/28/2017            Interim History:   Intubation 4/25/2017.   Bronchoscopy 4/26/2017.     Remains intubated but but responsive and follows simple commands.     On milrinone, other vasopressors are on hold.     Exposure History obtained 4/25/2017 from family.   Originally from [place].   Also lived in the [facility].   [place]rated to [place] many years ago but visits [place] frequently, last visit was a year ago.   Lives in [place], [place]. Also lived in [place], [place] in the past.   No pets.   No known history of TB exposure, whether he has positive PPD is not clear.          History of Present Illness:   Luke Henao is a 51 year old German man with history of diabetes, who presented to [facility] via EMS on 3/28/17. He complained initially of a 2 day history of flu-like symptoms, with nausea and vomiting. On EMS arrival, he was noted to by hypotensive, and EKG was concerning for inferior STEMI. He went to the cath lab urgently, found to have severe LAD disease and LCx stenosis. He underwent aspiration thrombectomy and POBA of 2 sites, felt to need CABG, though thought to be a poor surgical candidate. While in the cath lab, was noted to be persistently hypotensive, and had an episode of hematemesis. He also developed complete heart block with bradycardia, and a temporary pacemaker and IABP were placed. He was started on dopamine and then transferred to [facility] for further management. On arrival, he underwent PCI with 7 stents placed. Since then, he has remained hypotensive and IABP-dependent, now on inotropes with replacement of a subclavian IABP.     The infectious disease team has been  consulted for assistance in managing antimicrobials given intermittent fevers. Hospital course notable for fevers on 3/27 and 3/28 (with sputum culture growing Staph aureus and Haemophilus influenzae), thought secondary to possible aspiration pneumonia, and treated with vancomycin + zosyn, narrowed to Unasyn alone 3/30 through 4/3, then broadened again to vancomycin + Zosyn from 4/3 through 4/4), with Zosyn alone from 4/4 through 4/7. He was fever free and off all antibiotics from 4/7 through 4/15. Urine culture from 4/12 grew 10-50K colonies of E.coli. He spiked a low-grade fever on 4/15 and was started back on vancomycin + Zosyn, narrowed to ceftriaxone alone on 4/16 (to cover the E.coli UTI) through 4/19. Subclavian IABP was placed on 4/19, though he spiked a high fever to 102.2 and was broadened to vancomycin + zosyn again. PICC line replaced today, and Carrabelle Benjy catheter removed.    Today, the patient reports his biggest concern is pain in the RUE surrounding the PICC site. A new PICC has been placed in his LUE, though the old line remained in place until all IV access was secured. He denies nausea, vomiting, abdominal pain, constipation, diarrhea, headaches, SOB, chest pain or cough. No rashes or concerning lesions. He denies any dysuria, though urinary catheter remains in place.              Current Medications (antimicrobials listed in bold):       vancomycin (VANCOCIN) IV  1,250 mg Intravenous Q24H     meropenem  1 g Intravenous Q8H     aspirin  324 mg Oral or Feeding Tube Daily     heparin lock flush  5-10 mL Intracatheter Q24H     QUEtiapine  12.5 mg Oral At Bedtime     pantoprazole  40 mg Per Feeding Tube BID     atorvastatin  40 mg Oral or Feeding Tube Daily at 8 pm     pneumococcal vaccine  0.5 mL Intramuscular Once     sodium chloride (PF)  3 mL Intracatheter Q8H     multivitamins with minerals  15 mL Per Feeding Tube Daily            Allergies:   No Known Allergies         Physical Exam:   Ranges for  vital signs:  Temp:  [98.7  F (37.1  C)-101.3  F (38.5  C)] 99.3  F (37.4  C)  Heart Rate:  [108-123] 112  Resp:  [12-16] 12  BP: (107)/(83) 107/83  MAP:  [68 mmHg-95 mmHg] 70 mmHg  Arterial Line BP: ()/(52-73) 89/55  FiO2 (%):  [40 %] 40 %  SpO2:  [98 %-100 %] 100 %    Physical Examination:  GENERAL: Intubated, follows simple commands.     HEENT:  Head is normocephalic, atraumatic.    LUNGS:  CTABi bilaterally.   CARDIOVASCULAR:  Tachycardic, regular rhythm.  ABDOMEN:  Soft, non-tender, Normal BS.   : Chavis in place draining clear yellow urine.  SKIN:  No acute rashes. Site of new IABP in the right chest wall and the Site of old IABP in right groin are clear and dry, with no erythema or purulence noted. Lines are in place without any surrounding erythema or exudate.             Laboratory Data:     Inflammatory Markers    Recent Labs   Lab Test  04/08/17   0425   CRP  18.0*       Hematology Studies    Recent Labs   Lab Test  04/28/17   0438  04/27/17   0433  04/26/17   0400  04/25/17   0154  04/24/17   0414  04/23/17   0525   03/27/17   0551   03/26/17   2253  03/26/17   1800   WBC  14.1*  15.5*  14.9*  13.1*  9.5  7.4   < >  15.9*   < >  18.6*  14.3*   ANEU   --    --   12.1*   --    --    --    --   10.7*   --   15.4*  12.1*   AEOS   --    --   0.0   --    --    --    --   0.0   --   0.0  0.0   HGB  7.6*  7.6*  7.9*  8.2*  7.6*  7.5*   < >  13.1*   < >  13.6  15.7   MCV  102*  100  99  100  99  100   < >  90   < >  91  90   PLT  405  355  317  316  278  268   < >  177   < >  196  204    < > = values in this interval not displayed.       Immune Globulin Studies    Recent Labs   Lab Test  04/18/17   2349   IGG  1610   IGM  63   IGA  394*       Metabolic Studies     Recent Labs   Lab Test  04/28/17   0438  04/27/17 2026 04/27/17   1620  04/27/17   0433  04/26/17 2039 04/26/17   1607  04/26/17   0400   NA  142   --   142  139   --   140  142   POTASSIUM  4.8  3.9  4.0  4.3  4.5  3.8  3.4   CHLORIDE  104    --   104  102   --   103  101   CO2  29   --   32  31   --   31  27   BUN  33*   --   31*  30   --   31*  27   CR  1.29*   --   1.23  1.28*   --   1.60*  1.36*   GFRESTIMATED  59*   --   62  60*   --   46*  56*       Hepatic Studies    Recent Labs   Lab Test  04/28/17   0438  04/27/17   0433  04/26/17   0400  04/25/17   0154  04/24/17   0414  04/23/17   0525   BILITOTAL  0.5  0.4  0.5  0.4  0.6  1.0   ALKPHOS  86  75  66  69  62  68   ALBUMIN  1.8*  1.8*  1.8*  2.0*  1.9*  2.0*   AST  29  26  23  22  24  35   ALT  24  24  24  36  38  49       Thyroid Studies    Recent Labs   Lab Test  04/14/17   1751  04/08/17   0425   TSH  2.54  1.33       Microbiology:  Culture Micro   Date Value Ref Range Status   04/26/2017 No growth after 2 days  Final   04/26/2017 No growth after 2 days  Final   04/26/2017 No growth  Final   04/26/2017 Culture negative after 20 hours  Final   04/26/2017   Final    Culture received and in progress.  Positive AFB results are called as soon as   detected.  Final report to follow in 7 to 8 weeks.  Assayed at [facility] [address]     04/26/2017 Moderate growth Yeast (A)  Final   04/25/2017 No growth after 3 days  Final   04/25/2017 No growth after 3 days  Final   04/25/2017 No growth after 3 days  Final   04/23/2017 No growth after 5 days  Final   04/20/2017 No growth  Final   04/20/2017   Final    Canceled, Test credited Quantity not sufficient Notification of test cancellation   was given to ERIC ROSENBAUM ON UU4E, REORDERED FOR RN TO RECOLLECT     04/20/2017 No growth  Final   04/19/2017 No growth  Final   04/19/2017 No growth  Final   04/19/2017 No growth  Final   04/15/2017 No growth  Final   04/15/2017 >100,000 colonies/mL Escherichia coli (A)  Final   04/15/2017 Canceled, Test credited Duplicate request  Final   04/12/2017 10,000 to 50,000 colonies/mL Escherichia coli (A)  Final   04/12/2017 No growth  Final   04/12/2017 No growth  Final   04/05/2017 No growth  Final    04/03/2017 No growth  Final   04/03/2017 No growth  Final   04/03/2017 No growth  Final   04/03/2017 No growth  Final   03/29/2017 (A)  Final    Moderate growth Staphylococcus aureus Susceptibility testing done on previous   specimen     03/29/2017 No growth  Final   03/28/2017 No growth  Final   03/28/2017 (A)  Final    Heavy growth Staphylococcus aureus  Heavy growth Haemophilus influenzae Beta lactamase negative Beta-lactamase   negative Haemophilus influenzae are usually susceptible to ampicillin,   amoxacillin/clavulanic acid, levofloxacin, and 3rd generation cephalosporins,   such as ceftriaxone.  Light growth Normal brianda     03/27/2017 No growth  Final   03/26/2017 No growth  Final   03/26/2017 No growth  Final       Urine Studies    Recent Labs   Lab Test  04/20/17   1711  04/19/17   2214  04/15/17   0135  04/12/17   1852  04/08/17   0705   LEUKEST  Large*  Large*  Large*  Moderate*  Negative   WBCU  73*  59*  50*  5*  1       Vancomycin Levels    Recent Labs   Lab Test  04/28/17   0438  04/23/17   0525  04/21/17   1118   VANCOMYCIN  19.0  16.7  20.6       Hepatitis B Testing   Recent Labs   Lab Test  04/15/17   0350   HBCAB  Reactive   A reactive result indicates acute, chronic or past/resolved hepatitis B   infection.  *   HEPBANG  Nonreactive     Hepatitis C Testing     Hepatitis C Antibody   Date Value Ref Range Status   04/15/2017  NR Final    Nonreactive   Assay performance characteristics have not been established for newborns,   infants, and children                Relevant Imaging:   CXR 4/28/2017 REVIEWED BY MYSELF  IMPRESSION:   1. Intra-aortic balloon pump marker at the level of suzanne.  2. Decreased patchy and nodular pulmonary opacities, worse in the  right midlung. Differentials improving pulmonary edema, infection,  hemorrhage or ARDS.  3. Decreased trace right pleural effusion. Stable trace left pleural  effusion.     Doppler 4/23/2017 of bilateral UE and LE  IMPRESSION:  1. Small amount  of peripheral nonocclusive thrombus in the left  internal jugular vein.  2. No evidence of deep venous thrombosis in the right upper  extremity.  IMPRESSION:  1. Small amount of chronic appearing, nonocclusive thrombus in the  left common femoral vein.  2. No DVT in the right lower extremity.      CT chest 4/26/2017 reviewed by myself   IMPRESSION:   Peribronchovascular opacities mixed with some septal lines in the mid  and upper lobes with bilateral pleural effusions and lower lobe  groundglass, overall most consistent with pulmonary edema. Overlying  infection or hemorrhage cannot be ruled out.    CT CHEST/ABDOMEN/PELVIS, 3/27/17:  IMPRESSION:   1. Small mediastinal hemorrhage, small bilateral pleural effusions  which may be hemorrhagic. Small intraperitoneal hemorrhage. Minimal  pericardial hemorrhage.  2. Minimal pneumoperitoneum in the left paracolic gutter, of unknown  significance. No pneumatosis as clinically questioned.  3. IABP slightly low in position.  4. Bilateral pulmonary dependent consolidations represent compression  atelectasis, however differentials include aspiration      CT ABDOMEN/PELVIS W/OUT CONTRAST, 4/4/17:  IMPRESSION:   1. No evidence of ischemic bowel, obstruction, or intra-abdominal  infection.  2. Bibasilar patchy pulmonary opacities likely represent combination  of aspiration and atelectasis.  3. Small amount of simple free fluid within the lower abdomen.  4. Small left retroperitoneal hematoma along the iliac vasculature  likely postprocedural.   5. Unchanged size of bilateral pleural effusions, which now consist of  simple fluid.    CT ABDOMEN PELVIS W/O CONTRAST, 4/11/2017 6:39 PM  COMPARISON: CT on 4/4/2017.  HISTORY: abdominal pain and maroon BMs.  Impression:  1. Stable small retroperitoneal hemorrhage adjacent to left iliac  vessels in the left low pelvis.   2. Slightly improved small bilateral pleural effusions and associated  bibasilar opacities, likely representing pulmonary  edema.  3. Previously seen intraperitoneal fluid has resolved.  4. Intra-aortic balloon pump in the descending thoracic aorta,  superior tip out of view.

## 2017-04-28 NOTE — PLAN OF CARE
Problem: Goal Outcome Summary  Goal: Goal Outcome Summary  1) pt will be hemodynamically stable  2) pt will tolerate weaning of IABP  3) pain will be adequately controlled     OT: 4E: Pt intubated/sedated and following ~80% of commands, only commands not followed when pt closing eyes and sleeping, during OT tx session. Therapist provided AA/PROM to BUEs/LEs to maintain ROM needed for ADLs and functional mobility. Pt's VSS on vent settings CMV, FiO2 40% and PEEP 5. Rec: rehab to increase ind and functional endurance in ADLS/IADLS

## 2017-04-28 NOTE — PROGRESS NOTES
CV Intensivist Team Note:    Patient seen, ventilator settings evaluated. Discussed patient with primary service, and will continue to follow.      Ashvin Paulino MD  Anesthesiology Resident [id number] / PGY-4  [phone]

## 2017-04-28 NOTE — PROGRESS NOTES
"[facility]  Cardiology II Service / Advanced Heart Failure        Interval History:   - Has remained off ionotropes and vasoactive medications for the last 24 hours  - Afebrile  - Extubated today  - SCr stable          Intake/Output Summary (Last 24 hours) at 04/28/17 1459  Last data filed at 04/28/17 1400   Gross per 24 hour   Intake          2763.08 ml   Output             2870 ml   Net          -106.92 ml                 Pertinent Medications:  I have reviewed this patient's current medications      vancomycin (VANCOCIN) IV  1,250 mg Intravenous Q24H     [START ON 5/1/2017] aspirin  325 mg Oral Once     clopidogrel  600 mg Oral Once     [START ON 4/29/2017] clopidogrel  75 mg Oral Daily     [START ON 4/29/2017] aspirin EC  81 mg Oral Daily     meropenem  1 g Intravenous Q8H     heparin lock flush  5-10 mL Intracatheter Q24H     QUEtiapine  12.5 mg Oral At Bedtime     pantoprazole  40 mg Per Feeding Tube BID     atorvastatin  40 mg Oral or Feeding Tube Daily at 8 pm     pneumococcal vaccine  0.5 mL Intramuscular Once     sodium chloride (PF)  3 mL Intracatheter Q8H     multivitamins with minerals  15 mL Per Feeding Tube Daily         Examination:  /83  Temp 99.9  F (37.7  C)  Resp 12  Ht 1.575 m (5' 2.01\")  Wt 58.4 kg (128 lb 12 oz)  SpO2 100%  BMI 23.54 kg/m2  GEN: aao x 3  NECK: can not assess JVP   RESP: +crackles   CV: S1S2S3, holosystolic murmur at the apex  ABD: Soft, nontender to palpation, no HSM, +BS, no guarding  EXT: No peripheral edema, warm/well perfused   SKIN: Normal skin turgor, no rash     Data:  CMP  Recent Labs  Lab 04/28/17  0438 04/27/17 2026 04/27/17  1620 04/27/17  0433 04/26/17  2039 04/26/17  1607 04/26/17  0400  04/25/17  0154     --  142 139  --  140 142  < > 141   POTASSIUM 4.8 3.9 4.0 4.3 4.5 3.8 3.4  < > 4.2   CHLORIDE 104  --  104 102  --  103 101  < > 101   CO2 29  --  32 31  --  31 27  < > 30   ANIONGAP 9  --  6 6  --  6 14  " < > 10   *  --  132* 153*  --  130* 200*  < > 212*   BUN 33*  --  31* 30  --  31* 27  < > 20   CR 1.29*  --  1.23 1.28*  --  1.60* 1.36*  < > 1.14   GFRESTIMATED 59*  --  62 60*  --  46* 56*  < > 68   GFRESTBLACK 71  --  75 72  --  56* 67  < > 82   ROB 7.6*  --  7.7* 7.8*  --  7.1* 7.5*  < > 7.8*   MAG 2.6* 2.6* 2.5* 2.6* 2.4* 2.3 2.4*  < > 2.2   PHOS 3.9 2.4*  --  2.9 2.8  --  2.1*  --   --    PROTTOTAL 6.6*  --   --  6.6*  --   --  6.8  --  7.2   ALBUMIN 1.8*  --   --  1.8*  --   --  1.8*  --  2.0*   BILITOTAL 0.5  --   --  0.4  --   --  0.5  --  0.4   ALKPHOS 86  --   --  75  --   --  66  --  69   AST 29  --   --  26  --   --  23  --  22   ALT 24  --   --  24  --   --  24  --  36   < > = values in this interval not displayed.  CBC    Recent Labs  Lab 04/28/17  0438 04/27/17  0433 04/26/17  0400 04/25/17  0154   WBC 14.1* 15.5* 14.9* 13.1*   RBC 2.58* 2.54* 2.64* 2.74*   HGB 7.6* 7.6* 7.9* 8.2*   HCT 26.3* 25.5* 26.2* 27.4*   * 100 99 100   MCH 29.5 29.9 29.9 29.9   MCHC 28.9* 29.8* 30.2* 29.9*   RDW 19.0* 18.2* 18.1* 17.3*    355 317 316     INR  No lab results found in last 7 days.  Arterial Blood Gas    Recent Labs  Lab 04/28/17  1248 04/28/17  1208 04/28/17  0757 04/28/17  0439  04/27/17  0345  04/25/17  2337   PH 7.50*  --   --  7.45  --  7.44  --  7.49*   PCO2 40  --   --  44  --  45  --  41   PO2 92  --   --  110*  --  140*  --  168*   HCO3 31*  --   --  31*  --  30*  --  31*   O2PER 40.0 40.0 40.0 40.0  < > 45.0  45.0  < > 60  60   < > = values in this interval not displayed.    Imaging Studies:  Echo 3/27  The visual ejection fraction is estimated at 30-35%.  There is extensive inferior, inferoseptal, and inferolateral wall akinesis.  Basal/mid lateral wall hypokinesis  There is moderate to severe septal hypokinesis.  Septal wall motion abnormality may reflect pacemaker activation.  There is a catheter/pacemaker lead seen in the right ventricle.  The right ventricle is mildly  dilated.  Severely decreased right ventricular systolic function  There is no pericardial effusion.  The rhythm was paced.  Compared to the prior study, the LVEF appears somewhat decreased. Septal wall  motion abnormality larger- may reflect, in part, that patient in sinus tach on  prior study, and ventricular paced now. No hemodynamically significant  valvular abnormalities on 2D or color flow imaging     CT chest/abdomen 3/27   IMPRESSION:   1. Small mediastinal hemorrhage, small bilateral pleural effusions  which may be hemorrhagic. Small intraperitoneal hemorrhage. Minimal  pericardial hemorrhage.  2. Minimal pneumoperitoneum in the left paracolic gutter, of unknown  significance. No pneumatosis as clinically questioned.  3. IABP slightly low in position.  4. Bilateral pulmonary dependent consolidations represent compression  atelectasis, however differentials include aspiration.     Cath 3/26 [facility]  SUMMARY:   --Inferior STEMI w/ late presentation. Culprit dictated by complete  heart block, and cardiogenic shock. Emergent echo in the Cath Lab also  shows significant RV dysfunction.  --Three vessel coronary artery disease with diffuse coronary disease  out to the distal vessels  --Successful POBA of the prox and mid-RCA. Stent was not placed, in  anticipation he may need to be considered for bypass surgery in the  near future  --Insertion of a temporary pacemaker for bradycardia (AVB) and  hypotension  --Insertion of a IABP  --Upper GI bleed consistent with Kaylin-Bonilla     [facility] 3/27     CT head 3/28: normal      Echo 3/29  Interpretation Summary  Limited study. Ischemic CM. Moderately (EF 30-35%) reduced left ventricular  function is present. Traced at 32%.  Posterior wall akinesis is present. Lateral wall akinesis is present. Inferior  wall akinesis is present.  Right ventricular function, chamber size, wall motion, and thickness are  normal.  Dilation of the inferior vena cava is  present with abnormal respiratory  variation in diameter.     Compared to prior study, RV fxn is improved.     Echo 3/31  Left Ventricle  The Ejection Fraction is estimated at 50-55%. Inferior,posterior,lateral,basal  septal wall akinesis as reported before. No change.     CORONARY ANGIOGRAM 4/1:   1. Both coronary arteries arise from their respective cusps.  2. Right dominant.  3. LM has mild luminal irregularities.   4. LAD supplies the apex along with the RCA (type 2 LAD) and gives rise to septal perforators and a large and branching D1. There are widely patent stents extending from the proximal to mid LAD. The dLAD has mild to moderate disease to 30% stenosis. The D1 is jailed by the LAD stents, but has EBER 3 flow with disease to 30% stenosis.   5. The small ramus is no longer present.  6. LCX is occluded at the ostium.   7. RCA gives rise to PL branches and supplies PDA. There are widely patent stents extending from the proximal to mid RCA. The remainder of the mRCA has disease to 50% stenosis and the dRCA has disease to 10% stenosis. The RPDA has a widely patent stent and the remainder of the artery has mild disease to 10% stenosis. The RPLA has small vessels with diffuse disease including a distal branch occlusion. The RPLA supplies some small collateral branches to the dLCx.      COMPLICATIONS:  1. None      SUMMARY:   1. Cardiogenic shock following an inferior STEMI.  2. The LCx re-occlusion is not surprising as the recently revascularized LCx  had poor outflow and the revascularized portion did not supply a large territory (limited to no flow was present distal to the stented segment immediately following stenting). Repeat revascularization of the LCx would result in the same outcome of repeat closure and also risk embolizing thrombus from the LCx into the LAD so no treatment of the occluded LCx was performed.   3. Three vessel coronary artery disease with patent LAD and RCA stents and an occluded  LCx.     Echo 4/3  Left ventricular size is normal.  The Ejection Fraction is estimated at 35-40%.  Inferior wall akinesis is present.  Lateral wall akinesis is present.  Posterior wall akinesis is present.  Right ventricular function, chamber size, wall motion, and thickness are  normal.  Moderate mitral insufficiency is present.  Moderate tricuspid insufficiency is present.  Right ventricular systolic pressure is 47mmHg above the right atrial pressure.  The inferior vena cava is normal.     Echo 4/5  Moderately (EF 35-40%) reduced left ventricular function is present. Traced at  40%.  Moderate mitral insufficiency is present.  The cause of the mitral insufficiency appears to be restricted posterior  leaflet from posterior MI. No mitral leaflet pathology seen.  Dilation of the inferior vena cava is present with abnormal respiratory  variation in diameter.     CT abd without contrast 4/4  IMPRESSION:   1. No evidence of ischemic bowel, obstruction, or intra-abdominal  infection.  2. Bibasilar patchy pulmonary opacities likely represent combination  of aspiration and atelectasis.  3. Small amount of simple free fluid within the lower abdomen.  4. Small left retroperitoneal hematoma along the iliac vasculature  likely postprocedural.   5. Unchanged size of bilateral pleural effusions, which now consist of  simple fluid.    Assessment and Plan  51 year old man presented with cardiogenic shock from inferior STEMI s/p RCA aspiration thrombectomy and POBA on 3/26 at [facility] s/p IABP and initiation of Dopamine, also during procedure, CHB s/p temp pacer, emesis/hematmesis and altered mental status s/p intubation transferred here for consideration of mechanical support on 3/27. Had PCI to RCA, LAD and LCx (on ASA and plavix) started on epinephrine in addition to dopamine, post procedure developed distributive shock picture from infection (aspiration pneumonia? Sputum cx growing staph aureus, on vanco and zosyn) vs post-MI  SIRS response. Currently in cardiogenic shock with IABP in situ    Card  # Cardiogenic shock 4/3- from underlying 3v CAD-has had high SVR and low CI, complicated by ischemic MR  # Due to inferior wall STEMI. S/P 7 stents to LAD, circ, RCA (angiogram report pending). Now with IABP, temporary pacemaker after 3rd deg heart block in cath lab at [facility] on 3/26   # Small pericardial hemorrhage   # Remains unclear why we are having a hard time weaning him off of IABP in the setting of EF of 30-35%. Microvascular disease is a possibility.  - IABP in place  - Hold off on isordil for now  - Cortisol normal  - Plan to keep CVP < 10  - f/u blood cultures  - f/u BAL results  - Closely monitor hemodynamics (currently cardiogenic shock, but given recent fever and concern for aspiration pna, could potentially develop a septic picture overnight)  - Planning for MitraClip on Monday 5/1/17  - Starting Plavix (loading 600 mg today, followed by 75 mg starting tomorrow)  - Cautious initiation of PO afterload reduction (hydralazine)    # Neuro  - intubated, sedated         Respiratory  - Intubated in the setting of worsening hypoxia on 4/25  - Possible causes: aspiration PNA vs PE vs volume overload   - Diuresing to CVP < 10, already on heparin inf for IABP, and being treated with Vanc and Pip/tazo  - Pulmonary consulted,appreciate recs  - f/u BAL results         # ID  - UA 4/15 with cloudy urine, 50 WBCs, many bacteria, no nitrates. UCx grew E coli - treated with CFTX (sensitive)  - Fever on 4/20 and single spike 100.2 overnight with blood cultures in process (no growth thus far), PICC and SGC removed, started on Vanc and pip/tazo  - 4/25 new fever and concern for aspiration pneumonia, continuing Vanc/Meropenem. ID following         # Endocrine  T2DM: HA1C 7.5 on admission.  - Insulin gtt per nurising protocol with hypoglycemia precautions.   - Switch to SQ Lantus 4/22      # GI  - Had several maroon BMs since 4/11, Hb overall dropped  by a 1.5 grams or so, heparin inf was held and GI consulted  - Colonoscopy 4/17 showed a rectal ulcer but no intervenable lesions  - Will continue to monitor Hb, back on heparin inf  - Protonix 40 PO BID         # Renal/  - Daily Cr  - Avoid nephrotoxins as able  - Plan to keep CVP < 10     -Hypernatremia  - Resolved. Stopped his D5W, only getting FWF's.        FEN: feeding tube, and dysphagia level II  Code: FULL  PPx: PPI 40mg BID, senna PRN  Dispo: pending stability    LINES:  - SC IABP  4/19  - LUE PICC   4/20  - Chavis catheter  4/20        Plan of care discussed with Dr. Alvina Loving MD  Cardiovascular Disease Fellow  Pager: [phone]

## 2017-04-28 NOTE — PLAN OF CARE
Problem: Goal Outcome Summary  Goal: Goal Outcome Summary  1) pt will be hemodynamically stable  2) pt will tolerate weaning of IABP  3) pain will be adequately controlled     Outcome: No Change  Patient remains intubated with vent settings: CMV- FiO2 40%, tV 450, Rate 12, Peep 8. Pressure supporting since 0600 and tolerating well so far. Lung sounds clear, diminished. -120s. MAPs >65. IABP in place 1:2 (see docflow). Tmax 101.3F overnight. Tylenol given x2 prn. Pt opens eyes spontaneously, moves all extremities and follows commands. Currently has Heparin, Fentanyl and Insulin drips infusing. NJ w/ TF at 50ml/hr (goal). OG to suction. Chavis intact with 30-90cc/hr output. Rectal tube intact with minimal output. Hemodynamic numbers: CVP 12-14, CO 3.5-3.9, CI 2.2-2.4, SVR 6745-2555, SVO2 44-50. Bilat soft wrist restraints on for patient safety. Coccyx ulcer- skin team seeing- treating with Triad barrier cream and Morphine prn for discomfort. Electrolytes replaced per protocol. Will continue to monitor and update MD with any changes.

## 2017-04-28 NOTE — CONSULTS
STRUCTURAL HEART CARE  CARDIOVASCULAR DIVISION    VALVE CONSULTATION      PERTINENT CLINICAL HISTORY:     Mr. Luke Henao is a 51 year old male with severe mitral regurgitation and cardiogenic shock requiring IABP support who is being evaluated for percutaneous mitral valve repair with the MitraClip device.  His medical history is notable for presentation with an inferior STEMI on 3/26/2017 and finding of multivessel disease treated with PCI in a staged approach with an extended post-infarct hospital course complicated by complete heart block requiring a temporary pacemaker, ischemic cardiomyopathy with LVEF 30-35% aspiration pneumonia with sputum growing staph aureus and haemophilus influenzae, recurrent hypoxemic respiratory failure requiring intubation, possible UTI and recently with fevers of unknown origin.  He has been treated with broad spectrum antibiotics for most of the past month.  His persistent shock prompted evaluation for possible LVAD implantation, but he is not a candidate secondary to small LV size.     He is not currently a surgical candidate.  His inability to wean from the IABP has been attributed to his severe mitral regurgitation so the structural heart team has been consulted for MitraClip treatment.  He is currently intubated, but the procedure has been discussed with his family including wife and daughter who agree to proceed with the procedure.     PAST MEDICAL HISTORY:   CAD  Ischemic cardiomyopathy     PAST SURGICAL HISTORY:     Past Surgical History:   Procedure Laterality Date     COLONOSCOPY N/A 4/17/2017    Procedure: COLONOSCOPY;  Surgeon: Rashaad Bundy MD;  Location: [facility] GI     INSERT INTRAAORTIC BALLOON PUMP Right 4/19/2017    Procedure: INSERT INTRAAORTIC BALLOON PUMP;  Right Subclavian Intra Aortic Balloon Pump Insertion using Maquet 40cc Ballon Catheter, Implentation of 8mm Gelweave Woven Vascular Prosthesis, Removal of Left Femoral Ballon Pump Catheter, Flouroscopy;   Surgeon: Keshav Leung MD;  Location: [facility] OR        CURRENT MEDICATIONS:     Current Facility-Administered Medications   Medication     vancomycin (VANCOCIN) 1,250 mg in NaCl 0.9 % 250 mL intermittent infusion     hydrALAZINE (APRESOLINE) half-tab 12.5 mg     fentaNYL (SUBLIMAZE) infusion     meropenem (MERREM) 1 g vial to attach to  mL bag     DOPamine 400 mg in dextrose 5% 250 mL (adult std) - premix     midazolam (VERSED) 125 mg in NaCl 0.9 % 125 mL infusion     vasopressin (PITRESSIN) 40 Units in D5W 40 mL infusion     dextrose 10 % 1,000 mL infusion     insulin 1 unit/mL in saline (NovoLIN, HumuLIN Regular) drip - ADULT IV Infusion     morphine 0.1% in solosite topical gel     glucose 40 % gel 15-30 g    Or     dextrose 50 % injection 25-50 mL    Or     glucagon injection 1 mg     heparin  drip 25,000 units in 0.45% NaCl 250 mL (see additional administration details for dose)     heparin bolus from infusion pump     lidocaine (LMX4) kit     heparin lock flush 10 UNIT/ML injection 2-5 mL     aspirin chewable tablet 324 mg     sodium chloride (PF) 0.9% PF flush 10-20 mL     heparin lock flush 10 UNIT/ML injection 5-10 mL     heparin lock flush 10 UNIT/ML injection 5-10 mL     QUEtiapine (SEROquel) half-tab 12.5 mg     acetaminophen (TYLENOL) tablet 650 mg     pantoprazole (PROTONIX) suspension 40 mg     atorvastatin (LIPITOR) tablet 40 mg     alteplase (CATHFLO ACTIVASE) injection 2 mg     polyethylene glycol (MIRALAX/GLYCOLAX) Packet 17 g     senna-docusate (SENOKOT-S;PERICOLACE) 8.6-50 MG per tablet 1-2 tablet     potassium chloride SA (K-DUR/KLOR-CON M) CR tablet 20-40 mEq     potassium chloride (KLOR-CON) Packet 20-40 mEq     potassium chloride 10 mEq in 100 mL intermittent infusion     potassium chloride 10 mEq in 100 mL intermittent infusion with 10 mg lidocaine     potassium chloride 20 mEq in 50 mL intermittent infusion     pneumococcal vaccine (PNEUMOVAX 23-irma) injection 0.5 mL      HYDROmorphone (PF) (DILAUDID) injection 0.3-0.5 mg     oxyCODONE (ROXICODONE) IR tablet 5-10 mg     bisacodyl (DULCOLAX) Suppository 10 mg     ondansetron (ZOFRAN) injection 4 mg     lidocaine 1 % 1 mL     lidocaine (LMX4) kit     sodium chloride (PF) 0.9% PF flush 3 mL     sodium chloride (PF) 0.9% PF flush 3 mL     lidocaine 1 % 0.5-5 mL     sodium chloride (PF) 0.9% PF flush 5-50 mL     heparin lock flush 10 UNIT/ML injection 2-5 mL     dextrose 10 % 1,000 mL infusion     multivitamins with minerals (CERTAVITE/CEROVITE) liquid 15 mL     magnesium sulfate 4 g in 100 mL sterile water (premade)     magnesium sulfate 2 g in NS intermittent infusion (PharMEDium or FV Cmpd)     potassium phosphate 10 mmol in D5W 250 mL intermittent infusion     potassium phosphate 15 mmol in D5W 250 mL intermittent infusion     potassium phosphate 20 mmol in D5W 500 mL intermittent infusion     potassium phosphate 20 mmol in D5W 250 mL intermittent infusion     potassium phosphate 25 mmol in D5W 500 mL intermittent infusion     naloxone (NARCAN) injection 0.1-0.4 mg     Patient is already receiving anticoagulation with heparin, enoxaparin (LOVENOX), warfarin (COUMADIN)  or other anticoagulant medication     dextrose 10 % 1,000 mL infusion        ALLERGIES:   No Known Allergies     FAMILY HISTORY:   No family history on file.     SOCIAL HISTORY:     Social History     Social History     Marital status:      Spouse name: N/A     Number of children: N/A     Years of education: N/A     Social History Main Topics     Smoking status: Current Every Day Smoker     Packs/day: 0.50     Types: Cigarettes     Smokeless tobacco: Not on file     Alcohol use Not on file     Drug use: Not on file     Sexual activity: Not on file     Other Topics Concern     Not on file     Social History Narrative        REVIEW OF SYSTEMS:     Unable to obtain secondary to intubation.      PHYSICAL EXAMINATION:     /83  Temp 100  F (37.8  C) (Esophageal)  " Resp 12  Ht 1.575 m (5' 2.01\")  Wt 58.4 kg (128 lb 12 oz)  SpO2 100%  BMI 23.54 kg/m2    GENERAL: No acute distress.  Eyes: EOMI. Sclerae white.   Neck: No thyromegaly. Symmetrical.   Heart: Tachycardic, regular, sys murmur  Lungs: No increased work of breathing  Abdomen: Soft, nontender, nondistended.   Extremities: No clubbing, cyanosis, or edema.      LABORATORY DATA:     LIPID RESULTS:  Lab Results   Component Value Date    CHOL 128 03/27/2017    HDL 36 (L) 03/27/2017    LDL 77 03/27/2017    TRIG 76 03/27/2017       LIVER ENZYME RESULTS:  Lab Results   Component Value Date    AST 29 04/28/2017    ALT 24 04/28/2017       CBC RESULTS:  Lab Results   Component Value Date    WBC 14.1 (H) 04/28/2017    RBC 2.58 (L) 04/28/2017    HGB 7.6 (L) 04/28/2017    HCT 26.3 (L) 04/28/2017     (H) 04/28/2017    MCH 29.5 04/28/2017    MCHC 28.9 (L) 04/28/2017    RDW 19.0 (H) 04/28/2017     04/28/2017       BMP RESULTS:  Lab Results   Component Value Date     04/28/2017    POTASSIUM 4.8 04/28/2017    CHLORIDE 104 04/28/2017    CO2 29 04/28/2017    ANIONGAP 9 04/28/2017     (H) 04/28/2017    BUN 33 (H) 04/28/2017    CR 1.29 (H) 04/28/2017    GFRESTIMATED 59 (L) 04/28/2017    GFRESTBLACK 71 04/28/2017    ROB 7.6 (L) 04/28/2017        A1C RESULTS:  Lab Results   Component Value Date    A1C 7.1 (H) 04/08/2017       INR RESULTS:  Lab Results   Component Value Date    INR 1.25 (H) 04/20/2017    INR 1.23 (H) 04/19/2017          PROCEDURES & FURTHER ASSESSMENTS:     Echocardiogram on 4/25/2017:  Interpretation Summary  The Ejection Fraction is estimated at 30-35% (calculated, 36%) at a heart rate  in excess of 130 bpm.  Severe mitral insufficiency is present    STS RISK SCORE: 2.5% with repair, 5.2% with replacement, 5.8% with repair + cab, 8.4% with replacement + cab  NYHA CLASS: 4     CLINICAL IMPRESSION:     Mr. Luke Henao is a 51 year old male with severe mitral regurgitation and cardiogenic shock " "requiring IABP support.  His severe mitral regurgitation is likely a significant contributor to his persistent shock and percutaneous mitral valve repair with a MitraClip will hopefully allow for resolution of shock and removal of the IABP.  He is not currently a surgical candidate secondary to his ongoing shock and deconditioning.    Plan Summary:  1) MitraClip on Monday, May 1, 2017.  2) Resume Plavix.    Patient was discussed with Dr. Cortez of interventional and structural cardiology.      Andrew Norwood  Structural Heart Disease &   Advanced Interventional Cardiology Fellow  [phone]      Patient Care Team:  No Ref-Primary, Physician as PCP - General  [place] 4/28/17    Patient examined, chart reviewed and the above reflects our joint assessment and plans.  As outlined by Dr. Norwood , Mr. Butler is critically with acute ischemic MR, IABP dependant, and found not suitable for a LVAD due to a small left ventricle. Due to aspiration and sepsis, (now controlled) and recent stroke he is not a candidate for a surgical MVR. Discussed at length with Dr. Lion Leung MD from CV surgery ( The \"heart team\") who is supportive for proceeding with the Mitraclip procedure as a extremely high risk procedure for this no-option patient. We will schedule the patient for Monday in the [facility] OR.    Ron Cortez MD       "

## 2017-04-28 NOTE — PROGRESS NOTES
Date: 4/28/2017    Time of Call: 12:56 PM     Diagnosis:  Severe mitral regurgitation     [ TORB ] Ordering provider: Ron Cortez MD  Order:   1.  Pre-procedural MitraClip orders  2.  SAMEERA, interventional  3.  Angiogram     Order received by: Luz Philip RN     Follow-up/additional notes:   Ordered for upcoming MitraClip scheduled on May 1.    Will need Cardiohelp in the room.  CPB if necessary, per Dr. Cortez.  Surgery scheduling was called and this information was added on.

## 2017-04-28 NOTE — PROGRESS NOTES
Pt extubated and placed on a 4 liter nasal cannula. BS: good aeration bilateral. Fair roductive cough, moderate amount of pale yellow secretions. Please see flowsheets for further information.

## 2017-04-28 NOTE — PHARMACY-VANCOMYCIN DOSING SERVICE
Pharmacy Vancomycin Note  Date of Service 2017  Patient's  1965   51 year old, male    Indication: Aspiration Pneumonia  Goal Trough Level: 15-20 mg/L  Day of Therapy: 4  Current Vancomycin regimen:  1250 mg IV q18h with most recent dose held due to SCr bump to 1.6 on     Current estimated CrCl = Estimated Creatinine Clearance: 57.2 mL/min (based on Cr of 1.29).    Creatinine for last 3 days  2017: 12:44 PM Creatinine 1.08 mg/dL;  4:51 PM Creatinine 1.32 mg/dL  2017:  4:00 AM Creatinine 1.36 mg/dL;  4:07 PM Creatinine 1.60 mg/dL  2017:  4:33 AM Creatinine 1.28 mg/dL;  4:20 PM Creatinine 1.23 mg/dL  2017:  4:38 AM Creatinine 1.29 mg/dL    Recent Vancomycin Levels (past 3 days)  2017:  4:38 AM Vancomycin Level 19.0 mg/L    Vancomycin IV Administrations (past 72 hours)                   vancomycin (VANCOCIN) 1,250 mg in NaCl 0.9 % 250 mL intermittent infusion (mg) 1,250 mg New Bag 17 1014    vancomycin (VANCOCIN) 1,250 mg in NaCl 0.9 % 250 mL intermittent infusion (mg) 1,250 mg New Bag 17 0607     1,250 mg New Bag 17 1250     1,250 mg New Bag 17                Nephrotoxins and other renal medications (Future)    Start     Dose/Rate Route Frequency Ordered Stop    17 1000  vancomycin (VANCOCIN) 1,250 mg in NaCl 0.9 % 250 mL intermittent infusion      1,250 mg Intravenous EVERY 24 HOURS 17 0956      17 2100  vasopressin (PITRESSIN) 40 Units in D5W 40 mL infusion      0.5-2.4 Units/hr  0.5-2.4 mL/hr  Intravenous CONTINUOUS 17 2045               Contrast Orders - past 72 hours (72h ago through future)    Start     Dose/Rate Route Frequency Ordered Stop    17 1730  perflutren diluted 1mL to 2mL with saline (OPTISON) diluted injection 5 mL      5 mL Intravenous ONCE 17 1719 17 1730          Interpretation of levels and current regimen:  Trough level is  Therapeutic, but trough was drawn 22 hours after last  dose.    Has serum creatinine changed > 50% in last 72 hours: No    Urine output:  good urine output (~1.2 mL/kg/hr since 0000)    Renal Function: Stable    Plan:  1.  Re-schedule dose at 1250 mg IV q24h  2.  Pharmacy will check trough levels as appropriate in 1-3 Days.    3. Serum creatinine levels will be ordered daily for the first week of therapy and at least twice weekly for subsequent weeks.      Uma Arredondo, Pharmacy Intern    I have reviewed and agree with the student's assessment and plan.    Mayi Ordonez, PharmD  CVICU and Advanced Heart Failure Pharmacist  Pager [phone]          .

## 2017-04-29 ENCOUNTER — APPOINTMENT (OUTPATIENT)
Dept: ULTRASOUND IMAGING | Facility: CLINIC | Age: 52
DRG: 228 | End: 2017-04-29
Attending: STUDENT IN AN ORGANIZED HEALTH CARE EDUCATION/TRAINING PROGRAM
Payer: COMMERCIAL

## 2017-04-29 ENCOUNTER — APPOINTMENT (OUTPATIENT)
Dept: GENERAL RADIOLOGY | Facility: CLINIC | Age: 52
DRG: 228 | End: 2017-04-29
Attending: INTERNAL MEDICINE
Payer: COMMERCIAL

## 2017-04-29 ENCOUNTER — APPOINTMENT (OUTPATIENT)
Dept: CT IMAGING | Facility: CLINIC | Age: 52
DRG: 228 | End: 2017-04-29
Attending: INTERNAL MEDICINE
Payer: COMMERCIAL

## 2017-04-29 ENCOUNTER — APPOINTMENT (OUTPATIENT)
Dept: SPEECH THERAPY | Facility: CLINIC | Age: 52
DRG: 228 | End: 2017-04-29
Payer: COMMERCIAL

## 2017-04-29 LAB
ACID FAST STN SPEC QL: NORMAL
ACID FAST STN SPEC QL: NORMAL
ALBUMIN SERPL-MCNC: 2 G/DL (ref 3.4–5)
ALP SERPL-CCNC: 89 U/L (ref 40–150)
ALT SERPL W P-5'-P-CCNC: 25 U/L (ref 0–70)
ANION GAP SERPL CALCULATED.3IONS-SCNC: 10 MMOL/L (ref 3–14)
ANION GAP SERPL CALCULATED.3IONS-SCNC: 13 MMOL/L (ref 3–14)
AST SERPL W P-5'-P-CCNC: 36 U/L (ref 0–45)
BACTERIA SPEC CULT: NO GROWTH
BASE EXCESS BLDA CALC-SCNC: 5.3 MMOL/L
BASE EXCESS BLDA CALC-SCNC: 5.7 MMOL/L
BASE EXCESS BLDA CALC-SCNC: 6.4 MMOL/L
BASE EXCESS BLDA CALC-SCNC: 6.9 MMOL/L
BASE EXCESS BLDA CALC-SCNC: 7.2 MMOL/L
BASE EXCESS BLDV CALC-SCNC: 5.5 MMOL/L
BASE EXCESS BLDV CALC-SCNC: 6.4 MMOL/L
BASE EXCESS BLDV CALC-SCNC: 6.8 MMOL/L
BASE EXCESS BLDV CALC-SCNC: 7.7 MMOL/L
BASE EXCESS BLDV CALC-SCNC: 8 MMOL/L
BASE EXCESS BLDV CALC-SCNC: 8 MMOL/L
BILIRUB DIRECT SERPL-MCNC: 0.2 MG/DL (ref 0–0.2)
BILIRUB SERPL-MCNC: 0.6 MG/DL (ref 0.2–1.3)
BUN SERPL-MCNC: 37 MG/DL (ref 7–30)
BUN SERPL-MCNC: 40 MG/DL (ref 7–30)
CA-I BLD-MCNC: 4.5 MG/DL (ref 4.4–5.2)
CALCIUM SERPL-MCNC: 7.9 MG/DL (ref 8.5–10.1)
CALCIUM SERPL-MCNC: 8.1 MG/DL (ref 8.5–10.1)
CHLORIDE SERPL-SCNC: 103 MMOL/L (ref 94–109)
CHLORIDE SERPL-SCNC: 104 MMOL/L (ref 94–109)
CO2 SERPL-SCNC: 26 MMOL/L (ref 20–32)
CO2 SERPL-SCNC: 28 MMOL/L (ref 20–32)
CREAT SERPL-MCNC: 1.32 MG/DL (ref 0.66–1.25)
CREAT SERPL-MCNC: 1.32 MG/DL (ref 0.66–1.25)
ERYTHROCYTE [DISTWIDTH] IN BLOOD BY AUTOMATED COUNT: 19.4 % (ref 10–15)
GFR SERPL CREATININE-BSD FRML MDRD: 57 ML/MIN/1.7M2
GFR SERPL CREATININE-BSD FRML MDRD: 57 ML/MIN/1.7M2
GLUCOSE BLDC GLUCOMTR-MCNC: 108 MG/DL (ref 70–99)
GLUCOSE BLDC GLUCOMTR-MCNC: 110 MG/DL (ref 70–99)
GLUCOSE BLDC GLUCOMTR-MCNC: 119 MG/DL (ref 70–99)
GLUCOSE BLDC GLUCOMTR-MCNC: 131 MG/DL (ref 70–99)
GLUCOSE BLDC GLUCOMTR-MCNC: 137 MG/DL (ref 70–99)
GLUCOSE BLDC GLUCOMTR-MCNC: 150 MG/DL (ref 70–99)
GLUCOSE BLDC GLUCOMTR-MCNC: 157 MG/DL (ref 70–99)
GLUCOSE BLDC GLUCOMTR-MCNC: 159 MG/DL (ref 70–99)
GLUCOSE BLDC GLUCOMTR-MCNC: 160 MG/DL (ref 70–99)
GLUCOSE BLDC GLUCOMTR-MCNC: 91 MG/DL (ref 70–99)
GLUCOSE SERPL-MCNC: 101 MG/DL (ref 70–99)
GLUCOSE SERPL-MCNC: 154 MG/DL (ref 70–99)
HCO3 BLD-SCNC: 29 MMOL/L (ref 21–28)
HCO3 BLD-SCNC: 30 MMOL/L (ref 21–28)
HCO3 BLD-SCNC: 30 MMOL/L (ref 21–28)
HCO3 BLD-SCNC: 31 MMOL/L (ref 21–28)
HCO3 BLD-SCNC: 31 MMOL/L (ref 21–28)
HCO3 BLDV-SCNC: 30 MMOL/L (ref 21–28)
HCO3 BLDV-SCNC: 31 MMOL/L (ref 21–28)
HCO3 BLDV-SCNC: 32 MMOL/L (ref 21–28)
HCO3 BLDV-SCNC: 33 MMOL/L (ref 21–28)
HCT VFR BLD AUTO: 26.4 % (ref 40–53)
HGB BLD-MCNC: 8 G/DL (ref 13.3–17.7)
LMWH PPP CHRO-ACNC: 0.26 IU/ML
MAGNESIUM SERPL-MCNC: 2.6 MG/DL (ref 1.6–2.3)
MAGNESIUM SERPL-MCNC: 2.7 MG/DL (ref 1.6–2.3)
MCH RBC QN AUTO: 30.3 PG (ref 26.5–33)
MCHC RBC AUTO-ENTMCNC: 30.3 G/DL (ref 31.5–36.5)
MCV RBC AUTO: 100 FL (ref 78–100)
MICRO REPORT STATUS: NORMAL
MYCOBACTERIUM SPEC CULT: NORMAL
O2/TOTAL GAS SETTING VFR VENT: ABNORMAL %
OXYHGB MFR BLD: 86 % (ref 92–100)
OXYHGB MFR BLD: 90 % (ref 92–100)
OXYHGB MFR BLD: 91 % (ref 92–100)
OXYHGB MFR BLD: 95 % (ref 92–100)
OXYHGB MFR BLD: 96 % (ref 92–100)
OXYHGB MFR BLDV: 34 %
OXYHGB MFR BLDV: 36 %
OXYHGB MFR BLDV: 38 %
OXYHGB MFR BLDV: 47 %
OXYHGB MFR BLDV: 47 %
OXYHGB MFR BLDV: 50 %
PCO2 BLD: 36 MM HG (ref 35–45)
PCO2 BLD: 38 MM HG (ref 35–45)
PCO2 BLD: 39 MM HG (ref 35–45)
PCO2 BLD: 40 MM HG (ref 35–45)
PCO2 BLD: 40 MM HG (ref 35–45)
PCO2 BLDV: 42 MM HG (ref 40–50)
PCO2 BLDV: 43 MM HG (ref 40–50)
PCO2 BLDV: 43 MM HG (ref 40–50)
PCO2 BLDV: 44 MM HG (ref 40–50)
PCO2 BLDV: 46 MM HG (ref 40–50)
PCO2 BLDV: 50 MM HG (ref 40–50)
PH BLD: 7.47 PH (ref 7.35–7.45)
PH BLD: 7.48 PH (ref 7.35–7.45)
PH BLD: 7.5 PH (ref 7.35–7.45)
PH BLD: 7.51 PH (ref 7.35–7.45)
PH BLD: 7.53 PH (ref 7.35–7.45)
PH BLDV: 7.42 PH (ref 7.32–7.43)
PH BLDV: 7.44 PH (ref 7.32–7.43)
PH BLDV: 7.46 PH (ref 7.32–7.43)
PH BLDV: 7.47 PH (ref 7.32–7.43)
PH BLDV: 7.48 PH (ref 7.32–7.43)
PH BLDV: 7.49 PH (ref 7.32–7.43)
PLATELET # BLD AUTO: 409 10E9/L (ref 150–450)
PO2 BLD: 55 MM HG (ref 80–105)
PO2 BLD: 61 MM HG (ref 80–105)
PO2 BLD: 69 MM HG (ref 80–105)
PO2 BLD: 80 MM HG (ref 80–105)
PO2 BLD: 88 MM HG (ref 80–105)
PO2 BLDV: 25 MM HG (ref 25–47)
PO2 BLDV: 28 MM HG (ref 25–47)
PO2 BLDV: 31 MM HG (ref 25–47)
PO2 BLDV: 31 MM HG (ref 25–47)
POTASSIUM SERPL-SCNC: 4.1 MMOL/L (ref 3.4–5.3)
POTASSIUM SERPL-SCNC: 4.4 MMOL/L (ref 3.4–5.3)
PROT SERPL-MCNC: 6.8 G/DL (ref 6.8–8.8)
RADIOLOGIST FLAGS: ABNORMAL
RBC # BLD AUTO: 2.64 10E12/L (ref 4.4–5.9)
SODIUM SERPL-SCNC: 140 MMOL/L (ref 133–144)
SODIUM SERPL-SCNC: 143 MMOL/L (ref 133–144)
SPECIMEN SOURCE: NORMAL
WBC # BLD AUTO: 14.9 10E9/L (ref 4–11)

## 2017-04-29 PROCEDURE — 25000125 ZZHC RX 250: Performed by: STUDENT IN AN ORGANIZED HEALTH CARE EDUCATION/TRAINING PROGRAM

## 2017-04-29 PROCEDURE — 00000146 ZZHCL STATISTIC GLUCOSE BY METER IP

## 2017-04-29 PROCEDURE — 25000132 ZZH RX MED GY IP 250 OP 250 PS 637: Performed by: INTERNAL MEDICINE

## 2017-04-29 PROCEDURE — 71010 XR CHEST PORT 1 VW: CPT

## 2017-04-29 PROCEDURE — 93971 EXTREMITY STUDY: CPT | Mod: LT

## 2017-04-29 PROCEDURE — 99211 OFF/OP EST MAY X REQ PHY/QHP: CPT | Mod: 25

## 2017-04-29 PROCEDURE — 25500064 ZZH RX 255 OP 636: Performed by: STUDENT IN AN ORGANIZED HEALTH CARE EDUCATION/TRAINING PROGRAM

## 2017-04-29 PROCEDURE — 25000128 H RX IP 250 OP 636: Performed by: INTERNAL MEDICINE

## 2017-04-29 PROCEDURE — 25000128 H RX IP 250 OP 636

## 2017-04-29 PROCEDURE — 20000004 ZZH R&B ICU UMMC

## 2017-04-29 PROCEDURE — 80048 BASIC METABOLIC PNL TOTAL CA: CPT | Performed by: INTERNAL MEDICINE

## 2017-04-29 PROCEDURE — 85520 HEPARIN ASSAY: CPT | Performed by: INTERNAL MEDICINE

## 2017-04-29 PROCEDURE — 92610 EVALUATE SWALLOWING FUNCTION: CPT | Mod: GN

## 2017-04-29 PROCEDURE — 40000982 ZZH STATISTICAL HAIKU-CANTO PHOTO

## 2017-04-29 PROCEDURE — 82330 ASSAY OF CALCIUM: CPT | Performed by: INTERNAL MEDICINE

## 2017-04-29 PROCEDURE — 85027 COMPLETE CBC AUTOMATED: CPT | Performed by: INTERNAL MEDICINE

## 2017-04-29 PROCEDURE — 80076 HEPATIC FUNCTION PANEL: CPT | Performed by: INTERNAL MEDICINE

## 2017-04-29 PROCEDURE — 05HN33Z INSERTION OF INFUSION DEVICE INTO LEFT INTERNAL JUGULAR VEIN, PERCUTANEOUS APPROACH: ICD-10-PCS | Performed by: INTERNAL MEDICINE

## 2017-04-29 PROCEDURE — 99291 CRITICAL CARE FIRST HOUR: CPT | Mod: 25 | Performed by: INTERNAL MEDICINE

## 2017-04-29 PROCEDURE — 25000132 ZZH RX MED GY IP 250 OP 250 PS 637: Performed by: STUDENT IN AN ORGANIZED HEALTH CARE EDUCATION/TRAINING PROGRAM

## 2017-04-29 PROCEDURE — 25000128 H RX IP 250 OP 636: Performed by: STUDENT IN AN ORGANIZED HEALTH CARE EDUCATION/TRAINING PROGRAM

## 2017-04-29 PROCEDURE — 40000076 ZZH STATISTIC IABP MONITORING

## 2017-04-29 PROCEDURE — 25000132 ZZH RX MED GY IP 250 OP 250 PS 637

## 2017-04-29 PROCEDURE — 40000275 ZZH STATISTIC RCP TIME EA 10 MIN

## 2017-04-29 PROCEDURE — 71010 XR CHEST PORT 1 VW: CPT | Mod: 77

## 2017-04-29 PROCEDURE — 82805 BLOOD GASES W/O2 SATURATION: CPT | Performed by: INTERNAL MEDICINE

## 2017-04-29 PROCEDURE — 83735 ASSAY OF MAGNESIUM: CPT | Performed by: INTERNAL MEDICINE

## 2017-04-29 PROCEDURE — S0171 BUMETANIDE 0.5 MG: HCPCS | Performed by: STUDENT IN AN ORGANIZED HEALTH CARE EDUCATION/TRAINING PROGRAM

## 2017-04-29 PROCEDURE — 70498 CT ANGIOGRAPHY NECK: CPT

## 2017-04-29 PROCEDURE — 40000281 ZZH STATISTIC TRANSPORT TIME EA 15 MIN

## 2017-04-29 PROCEDURE — B544ZZA ULTRASONOGRAPHY OF LEFT JUGULAR VEINS, GUIDANCE: ICD-10-PCS | Performed by: INTERNAL MEDICINE

## 2017-04-29 PROCEDURE — 25000125 ZZHC RX 250: Performed by: INTERNAL MEDICINE

## 2017-04-29 PROCEDURE — 40000225 ZZH STATISTIC SLP WARD VISIT

## 2017-04-29 RX ORDER — BUMETANIDE 0.25 MG/ML
2 INJECTION INTRAMUSCULAR; INTRAVENOUS ONCE
Status: COMPLETED | OUTPATIENT
Start: 2017-04-29 | End: 2017-04-29

## 2017-04-29 RX ORDER — SODIUM CHLORIDE 9 MG/ML
INJECTION, SOLUTION INTRAVENOUS
Status: DISCONTINUED
Start: 2017-04-29 | End: 2017-05-01 | Stop reason: HOSPADM

## 2017-04-29 RX ORDER — IOPAMIDOL 755 MG/ML
75 INJECTION, SOLUTION INTRAVASCULAR ONCE
Status: COMPLETED | OUTPATIENT
Start: 2017-04-29 | End: 2017-04-29

## 2017-04-29 RX ORDER — DOPAMINE HYDROCHLORIDE 160 MG/100ML
2.5 INJECTION, SOLUTION INTRAVENOUS CONTINUOUS
Status: DISCONTINUED | OUTPATIENT
Start: 2017-04-29 | End: 2017-05-02

## 2017-04-29 RX ADMIN — IOPAMIDOL 75 ML: 755 INJECTION, SOLUTION INTRAVENOUS at 15:09

## 2017-04-29 RX ADMIN — ATORVASTATIN CALCIUM 40 MG: 40 TABLET, FILM COATED ORAL at 20:08

## 2017-04-29 RX ADMIN — ACETAMINOPHEN 650 MG: 325 TABLET, FILM COATED ORAL at 10:25

## 2017-04-29 RX ADMIN — ACETAMINOPHEN 650 MG: 325 TABLET, FILM COATED ORAL at 02:47

## 2017-04-29 RX ADMIN — PANTOPRAZOLE SODIUM 40 MG: 40 TABLET, DELAYED RELEASE ORAL at 20:09

## 2017-04-29 RX ADMIN — HUMAN INSULIN 1 UNITS/HR: 100 INJECTION, SOLUTION SUBCUTANEOUS at 23:07

## 2017-04-29 RX ADMIN — SODIUM THIOSULFATE 0.25 MCG/KG/MIN: 250 INJECTION, SOLUTION INTRAVENOUS at 07:03

## 2017-04-29 RX ADMIN — DOPAMINE HYDROCHLORIDE 1 MCG/KG/MIN: 160 INJECTION, SOLUTION INTRAVENOUS at 11:00

## 2017-04-29 RX ADMIN — SODIUM CHLORIDE, PRESERVATIVE FREE 90 ML: 5 INJECTION INTRAVENOUS at 15:09

## 2017-04-29 RX ADMIN — VANCOMYCIN HYDROCHLORIDE 1250 MG: 10 INJECTION, POWDER, LYOPHILIZED, FOR SOLUTION INTRAVENOUS at 10:07

## 2017-04-29 RX ADMIN — SODIUM THIOSULFATE 0.7 MCG/KG/MIN: 250 INJECTION, SOLUTION INTRAVENOUS at 22:34

## 2017-04-29 RX ADMIN — CLOPIDOGREL 75 MG: 75 TABLET, FILM COATED ORAL at 08:23

## 2017-04-29 RX ADMIN — BUMETANIDE 2 MG: 0.25 INJECTION, SOLUTION INTRAMUSCULAR; INTRAVENOUS at 23:08

## 2017-04-29 RX ADMIN — ASPIRIN 81 MG: 81 TABLET, COATED ORAL at 08:23

## 2017-04-29 RX ADMIN — MIDAZOLAM 0.5 MG: 1 INJECTION INTRAMUSCULAR; INTRAVENOUS at 16:10

## 2017-04-29 RX ADMIN — MULTIVIT AND MINERALS-FERROUS GLUCONATE 9 MG IRON/15 ML ORAL LIQUID 15 ML: at 08:23

## 2017-04-29 RX ADMIN — MEROPENEM 1 G: 1 INJECTION, POWDER, FOR SOLUTION INTRAVENOUS at 04:43

## 2017-04-29 RX ADMIN — MEROPENEM 1 G: 1 INJECTION, POWDER, FOR SOLUTION INTRAVENOUS at 20:09

## 2017-04-29 RX ADMIN — HYDRALAZINE HYDROCHLORIDE 25 MG: 25 TABLET ORAL at 04:43

## 2017-04-29 RX ADMIN — Medication 12.5 MG: at 21:46

## 2017-04-29 RX ADMIN — MEROPENEM 1 G: 1 INJECTION, POWDER, FOR SOLUTION INTRAVENOUS at 15:23

## 2017-04-29 RX ADMIN — PANTOPRAZOLE SODIUM 40 MG: 40 TABLET, DELAYED RELEASE ORAL at 08:26

## 2017-04-29 RX ADMIN — ACETAMINOPHEN 650 MG: 325 TABLET, FILM COATED ORAL at 20:08

## 2017-04-29 RX ADMIN — MIDAZOLAM 0.5 MG: 1 INJECTION INTRAMUSCULAR; INTRAVENOUS at 16:00

## 2017-04-29 NOTE — PROCEDURES
"Procedure/Surgery Information   [facility], [place]    Bedside Procedure Note  Date of Service (when I performed the procedure): 04/29/2017    Luke Henao is a 51 year old male patient.  No diagnosis found.  No past medical history on file.  Temp: 98.4  F (36.9  C) Temp src: Axillary     Heart Rate: 115 Resp: 22 SpO2: 97 % O2 Device: Nasal cannula Oxygen Delivery: 4 LPM    Central line  Date/Time: 4/29/2017 4:51 PM  Performed by: JESSICA CLAROS  Authorized by: ANA MCADAMS   Consent: Verbal consent obtained. Written consent obtained.  Risks and benefits: risks, benefits and alternatives were discussed  Consent given by: patient and guardian  Patient understanding: patient states understanding of the procedure being performed  Patient consent: the patient's understanding of the procedure matches consent given  Procedure consent: procedure consent matches procedure scheduled  Site marked: the operative site was marked  Patient identity confirmed: verbally with patient and arm band  Time out: Immediately prior to procedure a \"time out\" was called to verify the correct patient, procedure, equipment, support staff and site/side marked as required.  Indications: central pressure monitoring and vascular access  Anesthesia: local infiltration    Anesthesia:  Anesthesia: local infiltration  Local Anesthetic: lidocaine 1% without epinephrine   Sedation:  Patient sedated: yes  Sedation type: anxiolysis   Sedatives: midazolam    Preparation: skin prepped with chlorhexidine  Location details: left internal jugular  Patient position: flat  Catheter type: triple lumen  Ultrasound guidance: yes  Number of attempts: 1  Successful placement: yes  Post-procedure: line sutured  Assessment: blood return through all parts and placement verified by x-ray  Patient tolerance: Patient tolerated the procedure well with no immediate complications           Jessica Claros"

## 2017-04-29 NOTE — PROGRESS NOTES
Patient with left-sided weakness (LUE, LLE) noticed evening on 4/28. Patient states this was new last night (though he was still intubated last night). Had been loaded with plavix earlier on 4/28 and also on full dose ASA and heparin for IABP. Initial concern for acute intracranial hemorrhage vs CVA.     PEx:  Gen: NAD, pleasant  Neuro: motor 5/5 RLE, RUE; 3/5 LLE; 4/5 LUE. Sensation intact. Following commands. PERRL, EOMI, CN2-12 intact. Answers questions intermittently.     CT head: 4/28/17  1. New areas of low attenuation involving the bilateral corona  radiata, right anterior internal capsule and right frontal  periventricular white matter since CT 4/7/2017. Findings represent age  indeterminate infarcts, likely suggestive of subacute diffuse brain  hypoxia. Cannot rule out an acute infarct. Though MRI is more  sensitive for a hyperacute/acute infarct.  2. No acute intracranial hemorrhage.    A/P:  Patient with recent hx cardiogenic shock which may have caused subacute diffuse brain hypoxia as etiology of these newer lesions. CT head with evidence of bilateral lesions, right anterior internal capsule and right frontal periventricular white matter lesions of unknown chronicity, though new from CT on 4/7/17. Consistent with left sided deficits (also of unknown duration). Patient with subclavian IABP, which theoretically has higher risk as nidus for embolic stroke.    -Discussed the patient with neurology, who agreed with conservative mgmt: statin, asa, plavix. Discussed unable to allow permissive hypertension due to tenuous cardiac status, IABP, the need for afterload reduction. Not a candidate for tpa.  -Day team to obtain formal consult in the am.  -Unable to obtain MRI brain for further elucidation and chronicity of lesions due to IABP.    Discussed with cardiology fellow.     Haily Byrd MD  IM PGY3

## 2017-04-29 NOTE — PLAN OF CARE
Problem: Goal Outcome Summary  Goal: Goal Outcome Summary  1) pt will be hemodynamically stable  2) pt will tolerate weaning of IABP  3) pain will be adequately controlled     Afebrile, HR 100s-110s Sinus Tach, BPs stable with MAPs 70s-80s via Art line and 80s-90s via IABP.  IABPs settings 1:2, 100% augmentation. SvO2 36-47, CO 3.1-3.8, CI 1.9-2.4. SVR 5468-9006. Nipride started this morning. CVP 12-15. On 2L NC. Disoriented to time and occasionally situation. New left sided weakness noted, see previous note. Reporting pain in L arm. Tylenol and ice packs applied. Small BM x3. NPO with TF on hold until feeding tube confirmed post pyloric.  UOP labile. Plan to go to cath lab today.  Will continue current plan of care and update MDs with any changes.

## 2017-04-29 NOTE — PLAN OF CARE
Problem: Goal Outcome Summary  Goal: Goal Outcome Summary  1) pt will be hemodynamically stable  2) pt will tolerate weaning of IABP  3) pain will be adequately controlled     clinical swallow evaluation completed per MD order. Pt presents with mild oral-phrayngeal dysphagia in the setting of weakness/deconditioning in prolonged hospital stay. Pt demonstrated functional tolerance of thin liquids and puree textures without overt s/sx of aspiration. Mastication/bolus manipulation adequate. Reocmmend initiate full liquid diet with supervision/assist as tolerated. Pt to sit upright for all PO intake, take small bites/sips and alternate consistencies. ST to follow for PO tolerance and to assist with diet advance as appropriate

## 2017-04-29 NOTE — PROVIDER NOTIFICATION
"Notified Dr. Haily Byrd of patient's L sided weakness on assessment.  Patient exhibiting LLE and LUE weakness and slight L facial droop which was a new finding. Unclear as to when last known normal was as patient is slightly encephalopathic per Dr. Gallo Sheffield (cardiology fellow following). Patient also complaining of pain in LUE from prior IV infusion slightly limiting movement.  Head CT and LUE ordered.    Update:  Head CT done showing \"age indeterminate infarcts, likely suggestive of subacute diffuse brain hypoxia.  Cannot rule out an acute infarct.\"  Per Anthony Byrd and Nettie, neurology consulted however will not change plan of care at this point as patient is fully anticoagulated with aspirin, heparin, and Plavix.  Will continue to monitor.   "

## 2017-04-29 NOTE — PROCEDURES
Called to see this patient for a new piv on right.  Pt has poor vaculature.  Ultrasound called at 1330.  Thank you

## 2017-04-29 NOTE — PLAN OF CARE
Problem: Goal Outcome Summary  Goal: Goal Outcome Summary  1) pt will be hemodynamically stable  2) pt will tolerate weaning of IABP  3) pain will be adequately controlled     ST 4E: Cx- Pt NPO for possible cath lab. Will follow up to complete swallow evaluation as appropriate.

## 2017-04-29 NOTE — PLAN OF CARE
Problem: Goal Outcome Summary  Goal: Goal Outcome Summary  1) pt will be hemodynamically stable  2) pt will tolerate weaning of IABP  3) pain will be adequately controlled     Outcome: Improving  D: Ischemic cardiomyopathy  I/A: Pt was AOx4 and PERRLA today. Pt continued on IABP today, rate 1:2. Pt's blood pressures were stable today, MAP's >65. Hemodynamic monitoring was done Q4H. CO 3.4-3.6. CI 2.1-2.2. SVR 1500's, hydralazine given today per MD order. SvO2 40's, MD notified. CVP 14-17. Bumex was given x2 per MD order. Pt was in sinus tachycardia rate 100-120's. Pt was afebrile.    Pt was on the ventilator this morning and was extubated this afternoon, tolerated well. Pt continued on tube feeds today, currently stopped for verification of NJ place after extubation. Chavis in place today, urine output about 100mL/hr. Pt continues to have stool incontinence and diarrhea. Pt restraints were discontinued this afternoon. Morphine cream was placed on coccyx wound today. Pt got up into the chair for a short period of time this afternoon. Electrolytes were replaced per protocol today. Insulin drip was continued today. Heparin drip was continued today. Fentanyl drip was stopped prior to extubation.  P: Continue hemodynamic monitoring. Possible angiogram tomorrow, will need  for consent. Mitral valve clip on Monday. Report given to oncoming RN.

## 2017-04-29 NOTE — CONSULTS
[facility], [place]      Neurology Stroke Consult    Patient Name: Luke Henao  : 1965 MRN#: [id number]    STROKE DATA    Stroke Code:  Stroke code not indicated.  Time patient seen:  2017 0030  Last known normal (pt's baseline):  Unclear    TPA treatment:  Not given due to outside the time window, current / recent anticoagulant use with INR > 1.7.     National Institutes of Health Stroke Scale (at presentation)  NIHSS done at:  time patient seen      Score    Level of consciousness:  (1)   Not alert; arousable w/ minor stim to obey/answer/respond     LOC questions:  (2)   Answers neither question correctly    LOC commands:  (0)   Performs both tasks correctly    Best gaze:  (0)   Normal    Visual:  (0)   No visual loss    Facial palsy:  (1)   Minor paralysis (flat nasolabial fold, smile asymmetry)    Motor arm (left):  (2)   Some effort against gravity    Motor arm (right):  (1)   Drift    Motor leg (left):  (2)   Some effort against gravity    Motor leg (right):  (1)   Drift    Limb ataxia:  (0)   Absent    Sensory:  (0)   Normal- no sensory loss    Best language:  (0)   Normal- no aphasia    Dysarthria:  (0)   Normal    Extinction and inattention:  (0)   No abnormality        NIHSS Total Score:  10        Dysphagia Screen  Time of screenin2017 12:45 AM  Screening results: Unable to complete due to decreased level of arousal. NPO until evaluated by speech.      ASSESSMENT & RECOMMENDATIONS     The patient was seen for left sided weakness and abnormal CT scan.  The differential diagnosis includes stroke and locus minorus.     Impression: Subacute stroke.   He does have left hemiparesis on exam which is new compared to his previous neuro exam. Radiographically, there is evidence of new hypodensities which would support a stroke diagnosis. The bilateral periventricular distribution of the lesions point towards hypoperfusion or embolic phenomenon. He is currently on  aspirin, plavix and is fully heparinized. Suspicion for LVO is low.     Prognosticating his neurological recovery depends on a number of systemic factors including his cardiac status and his ability to participate in therapy. Learning the etiology of his stroke will help in optimizing his risk factors.     Recommendations:  - Neuro checks per protocol  - BP goal per primary team  - Continue antiplatelets/anticoagulation per primary team  - Continue statin  - PT/OT/SLP    Prophylaxis          For VTE Prevention:  - Patient fully heparinized      The patient will be managed by the CV ICU team and  followed by the Stroke Consult service.    CIRILO Henao is a 51 year old male who was admitted with a STEMI on 3/28/17 and since has been in the hospital. Post MI, he developed refractory cardiogenic shock requiring balloon pump and inotropic agents. His hospital course has been complicated by encephalopathy and more recently aspiration pneumonia requiring intubation. He remained on sedation while intubated and was extubated later in the day. His nurse found left leg weakness which was new from prior exam. A non contrast head CT revealed new subacute strokes which were not present on previous CT (4/7/17). Stroke team was consulted to evaluate and comment on prognosis. Patient received seroquel before I saw him and was somnolent, unable to provide history.     Pertinent Past Medical/Surgical History  No past medical history on file.    Past Surgical History:   Procedure Laterality Date     COLONOSCOPY N/A 4/17/2017    Procedure: COLONOSCOPY;  Surgeon: Rashaad Bunyd MD;  Location: [facility] GI     INSERT INTRAAORTIC BALLOON PUMP Right 4/19/2017    Procedure: INSERT INTRAAORTIC BALLOON PUMP;  Right Subclavian Intra Aortic Balloon Pump Insertion using Maquet 40cc Ballon Catheter, Implentation of 8mm Gelweave Woven Vascular Prosthesis, Removal of Left Femoral Ballon Pump Catheter, Flouroscopy;  Surgeon: Keshav Leung  MD Marisa;  Location: [facility] OR       Medications:   Current Facility-Administered Medications   Medication     vancomycin (VANCOCIN) 1,250 mg in NaCl 0.9 % 250 mL intermittent infusion     [START ON 5/1/2017] aspirin tablet 325 mg     clopidogrel (PLAVIX) tablet 75 mg     aspirin EC EC tablet 81 mg     hydrALAZINE (APRESOLINE) tablet 25 mg     fentaNYL (SUBLIMAZE) infusion     meropenem (MERREM) 1 g vial to attach to  mL bag     DOPamine 400 mg in dextrose 5% 250 mL (adult std) - premix     midazolam (VERSED) 125 mg in NaCl 0.9 % 125 mL infusion     vasopressin (PITRESSIN) 40 Units in D5W 40 mL infusion     dextrose 10 % 1,000 mL infusion     insulin 1 unit/mL in saline (NovoLIN, HumuLIN Regular) drip - ADULT IV Infusion     morphine 0.1% in solosite topical gel     glucose 40 % gel 15-30 g    Or     dextrose 50 % injection 25-50 mL    Or     glucagon injection 1 mg     heparin  drip 25,000 units in 0.45% NaCl 250 mL (see additional administration details for dose)     heparin bolus from infusion pump     lidocaine (LMX4) kit     heparin lock flush 10 UNIT/ML injection 2-5 mL     sodium chloride (PF) 0.9% PF flush 10-20 mL     heparin lock flush 10 UNIT/ML injection 5-10 mL     heparin lock flush 10 UNIT/ML injection 5-10 mL     QUEtiapine (SEROquel) half-tab 12.5 mg     acetaminophen (TYLENOL) tablet 650 mg     pantoprazole (PROTONIX) suspension 40 mg     atorvastatin (LIPITOR) tablet 40 mg     alteplase (CATHFLO ACTIVASE) injection 2 mg     polyethylene glycol (MIRALAX/GLYCOLAX) Packet 17 g     senna-docusate (SENOKOT-S;PERICOLACE) 8.6-50 MG per tablet 1-2 tablet     potassium chloride SA (K-DUR/KLOR-CON M) CR tablet 20-40 mEq     potassium chloride (KLOR-CON) Packet 20-40 mEq     potassium chloride 10 mEq in 100 mL intermittent infusion     potassium chloride 10 mEq in 100 mL intermittent infusion with 10 mg lidocaine     potassium chloride 20 mEq in 50 mL intermittent infusion     pneumococcal vaccine  (PNEUMOVAX 23-irma) injection 0.5 mL     HYDROmorphone (PF) (DILAUDID) injection 0.3-0.5 mg     oxyCODONE (ROXICODONE) IR tablet 5-10 mg     bisacodyl (DULCOLAX) Suppository 10 mg     ondansetron (ZOFRAN) injection 4 mg     lidocaine 1 % 1 mL     lidocaine (LMX4) kit     sodium chloride (PF) 0.9% PF flush 3 mL     sodium chloride (PF) 0.9% PF flush 3 mL     lidocaine 1 % 0.5-5 mL     sodium chloride (PF) 0.9% PF flush 5-50 mL     heparin lock flush 10 UNIT/ML injection 2-5 mL     dextrose 10 % 1,000 mL infusion     multivitamins with minerals (CERTAVITE/CEROVITE) liquid 15 mL     magnesium sulfate 4 g in 100 mL sterile water (premade)     magnesium sulfate 2 g in NS intermittent infusion (PharMEDium or FV Cmpd)     potassium phosphate 10 mmol in D5W 250 mL intermittent infusion     potassium phosphate 15 mmol in D5W 250 mL intermittent infusion     potassium phosphate 20 mmol in D5W 500 mL intermittent infusion     potassium phosphate 20 mmol in D5W 250 mL intermittent infusion     potassium phosphate 25 mmol in D5W 500 mL intermittent infusion     naloxone (NARCAN) injection 0.1-0.4 mg     Patient is already receiving anticoagulation with heparin, enoxaparin (LOVENOX), warfarin (COUMADIN)  or other anticoagulant medication     dextrose 10 % 1,000 mL infusion   .    Allergies: No Known Allergies.    Family History: No family history on file..    Social History:   Social History   Substance Use Topics     Smoking status: Current Every Day Smoker     Packs/day: 0.50     Types: Cigarettes     Smokeless tobacco: Not on file     Alcohol use Not on file   .    Tobacco use: Current smoker, 0.5 PPD    ROS:  The 10 point Review of Systems is negative other than noted in the HPI or here.     PHYSICAL EXAMINATION  Vital Signs:  B/P: 107/83,  T: 98.1,  P: Data Unavailable,  R: 18    General:  patient lying in bed without any acute distress    HEENT:  normocephalic/atraumatic  Cardio:  RRR  Pulmonary:  no respiratory  distress  Abdomen:  soft  Extremities:  no edema  Skin:  intact     Neurologic  Mental Status:  Somnolent, arouses to voice, follows simple commands. Able to state his date of admission. Unable to state current month or his age.   Cranial Nerves:  Blinks to threat bilaterally. No gaze preference. No nystagmus. Subtle left facial weakness. Uvula and tongue midline.   Motor: Normal tone. Drift in RUE and RLE. Pain limits testing of LUE but seems to have some movement against gravity. Some movement against gravity in LLE as well. Unable to formally test power.   Reflexes: Toes are down, no clonus.   Sensory: Localized to noxious stimuli.   Coordination: Unable to test.   Station/Gait: Unable to test.     Labs  Labs and Imaging reviewed and used in developing the plan; pertinent results included.     Lab Results   Component Value Date    GLC 86 04/28/2017       The patient was discussed with the Fellow, Dr. William.  The staff is Dr. Bird.    Abdirashid Caldera [facility]  Pager: [phone]

## 2017-04-29 NOTE — PROGRESS NOTES
04/29/17 1338   General Information   Onset Date 03/27/17   Start of Care Date 04/29/17   Referring Physician Niranjan Loving MD   Patient Profile Review/OT: Additional Occupational Profile Info See Profile for full history and prior level of function   Patient/Family Goals Statement Pt wants to be able to eat and drink   Swallowing Evaluation Bedside swallow evaluation   Behaviorial Observations WFL (within functional limits)   Mode of current nutrition NJ   Respiratory Status O2 Supply   Type of O2 supply Nasal cannula   Comments Orders received for swallow evaluation. Pt  was admitted with a STEMI on 3/28/17 and since has been in the hospital. Post MI, he developed refractory cardiogenic shock requiring balloon pump and inotropic agents. His hospital course has been complicated by encephalopathy and more recently aspiration pneumonia requiring intubation. He remained on sedation while intubated and was extubated later in the day. His nurse found left leg weakness which was new from prior exam. A non contrast head CT revealed new subacute strokes which were not present on previous CT (4/7/17).    Clinical Swallow Evaluation   Oral Musculature generally intact   Structural Abnormalities none present   Dentition present and adequate   Mucosal Quality good   Mandibular Strength and Mobility intact   Oral Labial Strength and Mobility WFL   Lingual Strength and Mobility WFL   Velar Elevation intact   Buccal Strength and Mobility intact   Laryngeal Function Cough;Throat clear;Swallow;Voicing initiated   Oral Musculature Comments WFL   Additional Documentation Yes   Swallow Eval   Feeding Assistance frequent cues/help required   Clinical Swallow Eval: Thin Liquid Texture Trial   Mode of Presentation, Thin Liquids spoon;straw;fed by clinician   Volume of Liquid or Food Presented ice chips x3; 6oz thin water   Oral Phase of Swallow WFL   Pharyngeal Phase of Swallow intact  (no overt s/sx of aspiration observed)    Diagnostic Statement swallow functional for thin liquids   Clinical Swallow Eval: Puree Solid Texture Trial   Mode of Presentation, Puree spoon;fed by clinician   Volume of Puree Presented tsp bites x6   Oral Phase, Puree WFL   Pharyngeal Phase, Puree intact  (no overt s/sx of aspiration observed)   Diagnostic Statement swallow functional for puree textures on exam   Clinical Swallow Eval: Solid Food Texture Trial   Diagnostic Statement advanced textures not presented this date   Swallow Compensations   Swallow Compensations No compensations were used   Esophageal Phase of Swallow   Patient reports or presents with symptoms of esophageal dysphagia No   General Therapy Interventions   Planned Therapy Interventions Dysphagia Treatment   Dysphagia treatment Modified diet education;Instruction of safe swallow strategies   Swallow Eval: Clinical Impressions   Skilled Criteria for Therapy Intervention Skilled criteria met.  Treatment indicated.   Functional Assessment Scale (FAS) 5   Treatment Diagnosis mild dysphagia   Diet texture recommendations Full liquid   Recommended Feeding/Eating Techniques alternate between small bites and sips of food/liquid;maintain upright posture during/after eating for 30 mins;small sips/bites   Demonstrates Need for Referral to Another Service occupational therapy;physical therapy   Therapy Frequency 5 times/wk   Predicted Duration of Therapy Intervention (days/wks) 3 weeks    Anticipated Discharge Disposition inpatient rehabilitation facility   Risks and Benefits of Treatment have been explained. Yes   Patient, family and/or staff in agreement with Plan of Care Yes   Clinical Impression Comments clinical swallow evaluation completed per MD order. Pt presents with mild oral-phrayngeal dysphagia in the setting of weakness/deconditioning in prolonged hospital stay. Pt demonstrated functional tolerance of thin liquids and puree textures without overt s/sx of aspiration. Mastication/bolus  manipulation adequate. Recmmend initiate full liquid diet with supervision/assist as tolerated. Pt to sit upright for all PO intake, take small bites/sips and alternate consistencies. ST to follow for PO tolerance and to assist with diet advance as appropriate   Total Evaluation Time   Total Evaluation Time (Minutes) 15

## 2017-04-30 ENCOUNTER — APPOINTMENT (OUTPATIENT)
Dept: GENERAL RADIOLOGY | Facility: CLINIC | Age: 52
DRG: 228 | End: 2017-04-30
Attending: INTERNAL MEDICINE
Payer: COMMERCIAL

## 2017-04-30 ENCOUNTER — APPOINTMENT (OUTPATIENT)
Dept: OCCUPATIONAL THERAPY | Facility: CLINIC | Age: 52
DRG: 228 | End: 2017-04-30
Attending: INTERNAL MEDICINE
Payer: COMMERCIAL

## 2017-04-30 LAB
ACID FAST STN SPEC QL: NORMAL
ALBUMIN SERPL-MCNC: 2.1 G/DL (ref 3.4–5)
ALP SERPL-CCNC: 77 U/L (ref 40–150)
ALT SERPL W P-5'-P-CCNC: 21 U/L (ref 0–70)
ANION GAP SERPL CALCULATED.3IONS-SCNC: 11 MMOL/L (ref 3–14)
ANION GAP SERPL CALCULATED.3IONS-SCNC: 8 MMOL/L (ref 3–14)
AST SERPL W P-5'-P-CCNC: 34 U/L (ref 0–45)
BASE DEFICIT BLDV-SCNC: 3.9 MMOL/L
BASE EXCESS BLDA CALC-SCNC: 3.3 MMOL/L
BASE EXCESS BLDA CALC-SCNC: 5 MMOL/L
BASE EXCESS BLDV CALC-SCNC: 2 MMOL/L
BASE EXCESS BLDV CALC-SCNC: 4.3 MMOL/L
BASE EXCESS BLDV CALC-SCNC: 5.2 MMOL/L
BASE EXCESS BLDV CALC-SCNC: 6.2 MMOL/L
BASE EXCESS BLDV CALC-SCNC: 6.9 MMOL/L
BILIRUB DIRECT SERPL-MCNC: 0.2 MG/DL (ref 0–0.2)
BILIRUB SERPL-MCNC: 0.5 MG/DL (ref 0.2–1.3)
BUN SERPL-MCNC: 40 MG/DL (ref 7–30)
BUN SERPL-MCNC: 40 MG/DL (ref 7–30)
CA-I BLD-MCNC: 4.4 MG/DL (ref 4.4–5.2)
CALCIUM SERPL-MCNC: 7.9 MG/DL (ref 8.5–10.1)
CALCIUM SERPL-MCNC: 8 MG/DL (ref 8.5–10.1)
CHLORIDE SERPL-SCNC: 104 MMOL/L (ref 94–109)
CHLORIDE SERPL-SCNC: 106 MMOL/L (ref 94–109)
CO2 SERPL-SCNC: 27 MMOL/L (ref 20–32)
CO2 SERPL-SCNC: 27 MMOL/L (ref 20–32)
CREAT SERPL-MCNC: 1.22 MG/DL (ref 0.66–1.25)
CREAT SERPL-MCNC: 1.35 MG/DL (ref 0.66–1.25)
CRP SERPL-MCNC: 54 MG/L (ref 0–8)
ERYTHROCYTE [DISTWIDTH] IN BLOOD BY AUTOMATED COUNT: 19.7 % (ref 10–15)
GFR SERPL CREATININE-BSD FRML MDRD: 56 ML/MIN/1.7M2
GFR SERPL CREATININE-BSD FRML MDRD: 63 ML/MIN/1.7M2
GLUCOSE BLDC GLUCOMTR-MCNC: 101 MG/DL (ref 70–99)
GLUCOSE BLDC GLUCOMTR-MCNC: 112 MG/DL (ref 70–99)
GLUCOSE BLDC GLUCOMTR-MCNC: 112 MG/DL (ref 70–99)
GLUCOSE BLDC GLUCOMTR-MCNC: 113 MG/DL (ref 70–99)
GLUCOSE BLDC GLUCOMTR-MCNC: 116 MG/DL (ref 70–99)
GLUCOSE BLDC GLUCOMTR-MCNC: 121 MG/DL (ref 70–99)
GLUCOSE BLDC GLUCOMTR-MCNC: 127 MG/DL (ref 70–99)
GLUCOSE BLDC GLUCOMTR-MCNC: 129 MG/DL (ref 70–99)
GLUCOSE BLDC GLUCOMTR-MCNC: 132 MG/DL (ref 70–99)
GLUCOSE BLDC GLUCOMTR-MCNC: 145 MG/DL (ref 70–99)
GLUCOSE BLDC GLUCOMTR-MCNC: 146 MG/DL (ref 70–99)
GLUCOSE BLDC GLUCOMTR-MCNC: 153 MG/DL (ref 70–99)
GLUCOSE BLDC GLUCOMTR-MCNC: 80 MG/DL (ref 70–99)
GLUCOSE BLDC GLUCOMTR-MCNC: 96 MG/DL (ref 70–99)
GLUCOSE BLDC GLUCOMTR-MCNC: 98 MG/DL (ref 70–99)
GLUCOSE SERPL-MCNC: 104 MG/DL (ref 70–99)
GLUCOSE SERPL-MCNC: 144 MG/DL (ref 70–99)
HCO3 BLD-SCNC: 27 MMOL/L (ref 21–28)
HCO3 BLD-SCNC: 29 MMOL/L (ref 21–28)
HCO3 BLDV-SCNC: 20 MMOL/L (ref 21–28)
HCO3 BLDV-SCNC: 27 MMOL/L (ref 21–28)
HCO3 BLDV-SCNC: 29 MMOL/L (ref 21–28)
HCO3 BLDV-SCNC: 30 MMOL/L (ref 21–28)
HCO3 BLDV-SCNC: 31 MMOL/L (ref 21–28)
HCO3 BLDV-SCNC: 32 MMOL/L (ref 21–28)
HCT VFR BLD AUTO: 25.4 % (ref 40–53)
HGB BLD-MCNC: 7.6 G/DL (ref 13.3–17.7)
LMWH PPP CHRO-ACNC: 0.3 IU/ML
LMWH PPP CHRO-ACNC: 0.31 IU/ML
MAGNESIUM SERPL-MCNC: 2.9 MG/DL (ref 1.6–2.3)
MAGNESIUM SERPL-MCNC: 2.9 MG/DL (ref 1.6–2.3)
MCH RBC QN AUTO: 29.7 PG (ref 26.5–33)
MCHC RBC AUTO-ENTMCNC: 29.9 G/DL (ref 31.5–36.5)
MCV RBC AUTO: 99 FL (ref 78–100)
MICRO REPORT STATUS: NORMAL
O2/TOTAL GAS SETTING VFR VENT: ABNORMAL %
O2/TOTAL GAS SETTING VFR VENT: NORMAL %
OXYHGB MFR BLD: 92 % (ref 92–100)
OXYHGB MFR BLD: 93 % (ref 92–100)
OXYHGB MFR BLDV: 42 %
OXYHGB MFR BLDV: 44 %
OXYHGB MFR BLDV: 46 %
OXYHGB MFR BLDV: 49 %
OXYHGB MFR BLDV: 50 %
OXYHGB MFR BLDV: 55 %
PCO2 BLD: 36 MM HG (ref 35–45)
PCO2 BLD: 40 MM HG (ref 35–45)
PCO2 BLDV: 30 MM HG (ref 40–50)
PCO2 BLDV: 40 MM HG (ref 40–50)
PCO2 BLDV: 45 MM HG (ref 40–50)
PCO2 BLDV: 45 MM HG (ref 40–50)
PCO2 BLDV: 48 MM HG (ref 40–50)
PCO2 BLDV: 49 MM HG (ref 40–50)
PH BLD: 7.47 PH (ref 7.35–7.45)
PH BLD: 7.49 PH (ref 7.35–7.45)
PH BLDV: 7.42 PH (ref 7.32–7.43)
PH BLDV: 7.43 PH (ref 7.32–7.43)
PH BLDV: 7.43 PH (ref 7.32–7.43)
PH BLDV: 7.44 PH (ref 7.32–7.43)
PLATELET # BLD AUTO: 444 10E9/L (ref 150–450)
PO2 BLD: 68 MM HG (ref 80–105)
PO2 BLD: 74 MM HG (ref 80–105)
PO2 BLDV: 28 MM HG (ref 25–47)
PO2 BLDV: 29 MM HG (ref 25–47)
PO2 BLDV: 31 MM HG (ref 25–47)
PO2 BLDV: 32 MM HG (ref 25–47)
PO2 BLDV: 32 MM HG (ref 25–47)
PO2 BLDV: 35 MM HG (ref 25–47)
POTASSIUM SERPL-SCNC: 3.4 MMOL/L (ref 3.4–5.3)
POTASSIUM SERPL-SCNC: 3.7 MMOL/L (ref 3.4–5.3)
POTASSIUM SERPL-SCNC: 4 MMOL/L (ref 3.4–5.3)
PROT SERPL-MCNC: 6.8 G/DL (ref 6.8–8.8)
RBC # BLD AUTO: 2.56 10E12/L (ref 4.4–5.9)
SODIUM SERPL-SCNC: 140 MMOL/L (ref 133–144)
SODIUM SERPL-SCNC: 142 MMOL/L (ref 133–144)
SPECIMEN SOURCE: NORMAL
WBC # BLD AUTO: 12.6 10E9/L (ref 4–11)

## 2017-04-30 PROCEDURE — 84132 ASSAY OF SERUM POTASSIUM: CPT

## 2017-04-30 PROCEDURE — 83735 ASSAY OF MAGNESIUM: CPT | Performed by: INTERNAL MEDICINE

## 2017-04-30 PROCEDURE — 20000004 ZZH R&B ICU UMMC

## 2017-04-30 PROCEDURE — 40000014 ZZH STATISTIC ARTERIAL MONITORING DAILY

## 2017-04-30 PROCEDURE — 25000132 ZZH RX MED GY IP 250 OP 250 PS 637: Performed by: INTERNAL MEDICINE

## 2017-04-30 PROCEDURE — 80076 HEPATIC FUNCTION PANEL: CPT | Performed by: INTERNAL MEDICINE

## 2017-04-30 PROCEDURE — 25000125 ZZHC RX 250

## 2017-04-30 PROCEDURE — 85520 HEPARIN ASSAY: CPT | Performed by: INTERNAL MEDICINE

## 2017-04-30 PROCEDURE — 82330 ASSAY OF CALCIUM: CPT | Performed by: INTERNAL MEDICINE

## 2017-04-30 PROCEDURE — 99233 SBSQ HOSP IP/OBS HIGH 50: CPT | Performed by: INTERNAL MEDICINE

## 2017-04-30 PROCEDURE — 25000132 ZZH RX MED GY IP 250 OP 250 PS 637: Performed by: STUDENT IN AN ORGANIZED HEALTH CARE EDUCATION/TRAINING PROGRAM

## 2017-04-30 PROCEDURE — 80048 BASIC METABOLIC PNL TOTAL CA: CPT | Performed by: INTERNAL MEDICINE

## 2017-04-30 PROCEDURE — 40000133 ZZH STATISTIC OT WARD VISIT: Performed by: OCCUPATIONAL THERAPIST

## 2017-04-30 PROCEDURE — 25000128 H RX IP 250 OP 636: Performed by: STUDENT IN AN ORGANIZED HEALTH CARE EDUCATION/TRAINING PROGRAM

## 2017-04-30 PROCEDURE — 40000196 ZZH STATISTIC RAPCV CVP MONITORING

## 2017-04-30 PROCEDURE — 82805 BLOOD GASES W/O2 SATURATION: CPT | Performed by: INTERNAL MEDICINE

## 2017-04-30 PROCEDURE — 25000132 ZZH RX MED GY IP 250 OP 250 PS 637

## 2017-04-30 PROCEDURE — 85027 COMPLETE CBC AUTOMATED: CPT | Performed by: INTERNAL MEDICINE

## 2017-04-30 PROCEDURE — 40000076 ZZH STATISTIC IABP MONITORING

## 2017-04-30 PROCEDURE — 71010 XR CHEST PORT 1 VW: CPT

## 2017-04-30 PROCEDURE — 40000982 ZZH STATISTICAL HAIKU-CANTO PHOTO

## 2017-04-30 PROCEDURE — 25000125 ZZHC RX 250: Performed by: STUDENT IN AN ORGANIZED HEALTH CARE EDUCATION/TRAINING PROGRAM

## 2017-04-30 PROCEDURE — 40000275 ZZH STATISTIC RCP TIME EA 10 MIN

## 2017-04-30 PROCEDURE — 97530 THERAPEUTIC ACTIVITIES: CPT | Mod: GO | Performed by: OCCUPATIONAL THERAPIST

## 2017-04-30 PROCEDURE — S0171 BUMETANIDE 0.5 MG: HCPCS

## 2017-04-30 PROCEDURE — 97110 THERAPEUTIC EXERCISES: CPT | Mod: GO | Performed by: OCCUPATIONAL THERAPIST

## 2017-04-30 PROCEDURE — 00000146 ZZHCL STATISTIC GLUCOSE BY METER IP

## 2017-04-30 PROCEDURE — 86140 C-REACTIVE PROTEIN: CPT | Performed by: INTERNAL MEDICINE

## 2017-04-30 PROCEDURE — 25000128 H RX IP 250 OP 636

## 2017-04-30 RX ORDER — BUMETANIDE 0.25 MG/ML
2 INJECTION INTRAMUSCULAR; INTRAVENOUS ONCE
Status: COMPLETED | OUTPATIENT
Start: 2017-04-30 | End: 2017-04-30

## 2017-04-30 RX ADMIN — MEROPENEM 1 G: 1 INJECTION, POWDER, FOR SOLUTION INTRAVENOUS at 05:20

## 2017-04-30 RX ADMIN — SODIUM THIOSULFATE 2 MCG/KG/MIN: 250 INJECTION, SOLUTION INTRAVENOUS at 19:12

## 2017-04-30 RX ADMIN — MEROPENEM 1 G: 1 INJECTION, POWDER, FOR SOLUTION INTRAVENOUS at 21:39

## 2017-04-30 RX ADMIN — Medication 12.5 MG: at 21:38

## 2017-04-30 RX ADMIN — POTASSIUM CHLORIDE 20 MEQ: 29.8 INJECTION, SOLUTION INTRAVENOUS at 05:21

## 2017-04-30 RX ADMIN — Medication: at 12:07

## 2017-04-30 RX ADMIN — ACETAMINOPHEN 650 MG: 325 TABLET, FILM COATED ORAL at 02:45

## 2017-04-30 RX ADMIN — ACETAMINOPHEN 650 MG: 325 TABLET, FILM COATED ORAL at 20:35

## 2017-04-30 RX ADMIN — HEPARIN SODIUM 850 UNITS/HR: 10000 INJECTION, SOLUTION INTRAVENOUS at 16:07

## 2017-04-30 RX ADMIN — BUMETANIDE 2 MG: 0.25 INJECTION, SOLUTION INTRAMUSCULAR; INTRAVENOUS at 14:31

## 2017-04-30 RX ADMIN — ONDANSETRON 4 MG: 2 INJECTION INTRAMUSCULAR; INTRAVENOUS at 19:42

## 2017-04-30 RX ADMIN — POTASSIUM CHLORIDE 20 MEQ: 29.8 INJECTION, SOLUTION INTRAVENOUS at 17:08

## 2017-04-30 RX ADMIN — Medication: at 18:02

## 2017-04-30 RX ADMIN — ASPIRIN 81 MG: 81 TABLET, COATED ORAL at 07:54

## 2017-04-30 RX ADMIN — ATORVASTATIN CALCIUM 40 MG: 40 TABLET, FILM COATED ORAL at 19:41

## 2017-04-30 RX ADMIN — CLOPIDOGREL 75 MG: 75 TABLET, FILM COATED ORAL at 07:54

## 2017-04-30 RX ADMIN — Medication: at 07:54

## 2017-04-30 RX ADMIN — SODIUM THIOSULFATE 2 MCG/KG/MIN: 250 INJECTION, SOLUTION INTRAVENOUS at 12:22

## 2017-04-30 RX ADMIN — POTASSIUM CHLORIDE 20 MEQ: 29.8 INJECTION, SOLUTION INTRAVENOUS at 11:30

## 2017-04-30 RX ADMIN — MEROPENEM 1 G: 1 INJECTION, POWDER, FOR SOLUTION INTRAVENOUS at 13:04

## 2017-04-30 RX ADMIN — PANTOPRAZOLE SODIUM 40 MG: 40 TABLET, DELAYED RELEASE ORAL at 08:09

## 2017-04-30 RX ADMIN — BUMETANIDE 2 MG: 0.25 INJECTION, SOLUTION INTRAMUSCULAR; INTRAVENOUS at 17:52

## 2017-04-30 RX ADMIN — PANTOPRAZOLE SODIUM 40 MG: 40 TABLET, DELAYED RELEASE ORAL at 19:41

## 2017-04-30 RX ADMIN — MULTIVIT AND MINERALS-FERROUS GLUCONATE 9 MG IRON/15 ML ORAL LIQUID 15 ML: at 07:54

## 2017-04-30 ASSESSMENT — PAIN DESCRIPTION - DESCRIPTORS
DESCRIPTORS: HEADACHE
DESCRIPTORS: ACHING

## 2017-04-30 NOTE — PROGRESS NOTES
"[facility]  Cardiology II Service / Advanced Heart Failure  4/30/2017      Interval History:   - Overnight dopamine held, on nipride in the setting of rising SVR. Diuresing as needed to maintain CVP < 10  - CTA head/neck shows no hemorrhage but new areas of low attenuation in b/l hemispheres            Intake/Output Summary (Last 24 hours) at 04/30/17 1331  Last data filed at 04/30/17 1300   Gross per 24 hour   Intake          1294.29 ml   Output             1805 ml   Net          -510.71 ml           Pertinent Medications:  I have reviewed this patient's current medications      [START ON 5/1/2017] aspirin  325 mg Oral Once     clopidogrel  75 mg Oral Daily     aspirin EC  81 mg Oral Daily     meropenem  1 g Intravenous Q8H     heparin lock flush  5-10 mL Intracatheter Q24H     QUEtiapine  12.5 mg Oral At Bedtime     pantoprazole  40 mg Per Feeding Tube BID     atorvastatin  40 mg Oral or Feeding Tube Daily at 8 pm     pneumococcal vaccine  0.5 mL Intramuscular Once     sodium chloride (PF)  3 mL Intracatheter Q8H     multivitamins with minerals  15 mL Per Feeding Tube Daily         Examination:  /83  Temp 98.6  F (37  C) (Axillary)  Resp 22  Ht 1.575 m (5' 2.01\")  Wt 59.1 kg (130 lb 4.7 oz)  SpO2 98%  BMI 23.82 kg/m2  GEN: Intubated and sedated  NECK: can not assess JVP   RESP: +crackles   CV: S1S2S3, holosystolic murmur at the apex  ABD: Soft, nontender to palpation, no HSM, +BS, no guarding  EXT: No peripheral edema, warm/well perfused   SKIN: Normal skin turgor, no rash     Data:  CMP  Recent Labs  Lab 04/30/17  0401 04/29/17  1537 04/29/17  0430 04/28/17  1714 04/28/17  0438 04/27/17 2026  04/27/17  0433 04/26/17 2039    140 143 142 142  --   < > 139  --    POTASSIUM 3.4 4.1 4.4 3.9 4.8 3.9  < > 4.3 4.5   CHLORIDE 104 103 104 103 104  --   < > 102  --    CO2 27 28 26 34* 29  --   < > 31  --    ANIONGAP 11 10 13 6 9  --   < > 6  --    * 154* 101* " 86 150*  --   < > 153*  --    BUN 40* 40* 37* 34* 33*  --   < > 30  --    CR 1.35* 1.32* 1.32* 1.27* 1.29*  --   < > 1.28*  --    GFRESTIMATED 56* 57* 57* 60* 59*  --   < > 60*  --    GFRESTBLACK 68 70 70 73 71  --   < > 72  --    ROB 7.9* 7.9* 8.1* 8.1* 7.6*  --   < > 7.8*  --    MAG 2.9* 2.7* 2.6* 2.6* 2.6* 2.6*  < > 2.6* 2.4*   PHOS  --   --   --   --  3.9 2.4*  --  2.9 2.8   PROTTOTAL 6.8  --  6.8  --  6.6*  --   --  6.6*  --    ALBUMIN 2.1*  --  2.0*  --  1.8*  --   --  1.8*  --    BILITOTAL 0.5  --  0.6  --  0.5  --   --  0.4  --    ALKPHOS 77  --  89  --  86  --   --  75  --    AST 34  --  36  --  29  --   --  26  --    ALT 21  --  25  --  24  --   --  24  --    < > = values in this interval not displayed.  CBC    Recent Labs  Lab 04/30/17  0401 04/29/17  0430 04/28/17  0438 04/27/17  0433   WBC 12.6* 14.9* 14.1* 15.5*   RBC 2.56* 2.64* 2.58* 2.54*   HGB 7.6* 8.0* 7.6* 7.6*   HCT 25.4* 26.4* 26.3* 25.5*   MCV 99 100 102* 100   MCH 29.7 30.3 29.5 29.9   MCHC 29.9* 30.3* 28.9* 29.8*   RDW 19.7* 19.4* 19.0* 18.2*    409 405 355     INR  No lab results found in last 7 days.  Arterial Blood Gas    Recent Labs  Lab 04/30/17  0611 04/30/17  0401 04/30/17  0141 04/29/17  2148  04/29/17  1537 04/29/17  1259   PH  --  7.47*  --  7.48*  --  7.47* 7.50*   PCO2  --  40  --  40  --  40 39   PO2  --  68*  --  88  --  69* 55*   HCO3  --  29*  --  30*  --  29* 30*   O2PER 6L 4L 6L 7L  7L  < > 5L 4L   < > = values in this interval not displayed.    Imaging Studies:    CTA heaf and neck  Impression:   1. Head CTA demonstrates at least moderate stenosis of both cavernous  internal carotid arteries. Diffuse mild to moderate narrowing of the  basilar artery. Additional areas of mild bilateral M2 segment  narrowing, presumably also related to atherosclerosis.  2. No aneurysm of the major intracranial arteries.   3. Neck CTA demonstrates no carotid artery stenosis. Short segment  mild narrowing of the proximal right V2  segment.  4. No change in probable bilateral cerebral white matter infarcts. No  acute intracranial hemorrhage    Echo 3/27  The visual ejection fraction is estimated at 30-35%.  There is extensive inferior, inferoseptal, and inferolateral wall akinesis.  Basal/mid lateral wall hypokinesis  There is moderate to severe septal hypokinesis.  Septal wall motion abnormality may reflect pacemaker activation.  There is a catheter/pacemaker lead seen in the right ventricle.  The right ventricle is mildly dilated.  Severely decreased right ventricular systolic function  There is no pericardial effusion.  The rhythm was paced.  Compared to the prior study, the LVEF appears somewhat decreased. Septal wall  motion abnormality larger- may reflect, in part, that patient in sinus tach on  prior study, and ventricular paced now. No hemodynamically significant  valvular abnormalities on 2D or color flow imaging     CT chest/abdomen 3/27   IMPRESSION:   1. Small mediastinal hemorrhage, small bilateral pleural effusions  which may be hemorrhagic. Small intraperitoneal hemorrhage. Minimal  pericardial hemorrhage.  2. Minimal pneumoperitoneum in the left paracolic gutter, of unknown  significance. No pneumatosis as clinically questioned.  3. IABP slightly low in position.  4. Bilateral pulmonary dependent consolidations represent compression  atelectasis, however differentials include aspiration.     Cath 3/26 [facility]  SUMMARY:   --Inferior STEMI w/ late presentation. Culprit dictated by complete  heart block, and cardiogenic shock. Emergent echo in the Cath Lab also  shows significant RV dysfunction.  --Three vessel coronary artery disease with diffuse coronary disease  out to the distal vessels  --Successful POBA of the prox and mid-RCA. Stent was not placed, in  anticipation he may need to be considered for bypass surgery in the  near future  --Insertion of a temporary pacemaker for bradycardia (AVB)  and  hypotension  --Insertion of a IABP  --Upper GI bleed consistent with [place]lin-[place]     Cath [place] 3/27     CT head 3/28: normal      Echo 3/29  Interpretation Summary  Limited study. Ischemic CM. Moderately (EF 30-35%) reduced left ventricular  function is present. Traced at 32%.  Posterior wall akinesis is present. Lateral wall akinesis is present. Inferior  wall akinesis is present.  Right ventricular function, chamber size, wall motion, and thickness are  normal.  Dilation of the inferior vena cava is present with abnormal respiratory  variation in diameter.     Compared to prior study, RV fxn is improved.     Echo 3/31  Left Ventricle  The Ejection Fraction is estimated at 50-55%. Inferior,posterior,lateral,basal  septal wall akinesis as reported before. No change.     CORONARY ANGIOGRAM 4/1:   1. Both coronary arteries arise from their respective cusps.  2. Right dominant.  3. LM has mild luminal irregularities.   4. LAD supplies the apex along with the RCA (type 2 LAD) and gives rise to septal perforators and a large and branching D1. There are widely patent stents extending from the proximal to mid LAD. The dLAD has mild to moderate disease to 30% stenosis. The D1 is jailed by the LAD stents, but has EBER 3 flow with disease to 30% stenosis.   5. The small ramus is no longer present.  6. LCX is occluded at the ostium.   7. RCA gives rise to PL branches and supplies PDA. There are widely patent stents extending from the proximal to mid RCA. The remainder of the mRCA has disease to 50% stenosis and the dRCA has disease to 10% stenosis. The RPDA has a widely patent stent and the remainder of the artery has mild disease to 10% stenosis. The RPLA has small vessels with diffuse disease including a distal branch occlusion. The RPLA supplies some small collateral branches to the dLCx.      COMPLICATIONS:  1. None      SUMMARY:   1. Cardiogenic shock following an inferior STEMI.  2. The LCx re-occlusion is  not surprising as the recently revascularized LCx  had poor outflow and the revascularized portion did not supply a large territory (limited to no flow was present distal to the stented segment immediately following stenting). Repeat revascularization of the LCx would result in the same outcome of repeat closure and also risk embolizing thrombus from the LCx into the LAD so no treatment of the occluded LCx was performed.   3. Three vessel coronary artery disease with patent LAD and RCA stents and an occluded LCx.     Echo 4/3  Left ventricular size is normal.  The Ejection Fraction is estimated at 35-40%.  Inferior wall akinesis is present.  Lateral wall akinesis is present.  Posterior wall akinesis is present.  Right ventricular function, chamber size, wall motion, and thickness are  normal.  Moderate mitral insufficiency is present.  Moderate tricuspid insufficiency is present.  Right ventricular systolic pressure is 47mmHg above the right atrial pressure.  The inferior vena cava is normal.     Echo 4/5  Moderately (EF 35-40%) reduced left ventricular function is present. Traced at  40%.  Moderate mitral insufficiency is present.  The cause of the mitral insufficiency appears to be restricted posterior  leaflet from posterior MI. No mitral leaflet pathology seen.  Dilation of the inferior vena cava is present with abnormal respiratory  variation in diameter.     CT abd without contrast 4/4  IMPRESSION:   1. No evidence of ischemic bowel, obstruction, or intra-abdominal  infection.  2. Bibasilar patchy pulmonary opacities likely represent combination  of aspiration and atelectasis.  3. Small amount of simple free fluid within the lower abdomen.  4. Small left retroperitoneal hematoma along the iliac vasculature  likely postprocedural.   5. Unchanged size of bilateral pleural effusions, which now consist of  simple fluid.    Assessment and Plan  51 year old man presented with cardiogenic shock from inferior STEMI  s/p RCA aspiration thrombectomy and POBA on 3/26 at [facility] s/p IABP and initiation of Dopamine, also during procedure, CHB s/p temp pacer, emesis/hematmesis and altered mental status s/p intubation transferred here for consideration of mechanical support on 3/27. Had PCI to RCA, LAD and LCx (on ASA and plavix) started on epinephrine in addition to dopamine, post procedure developed distributive shock picture from infection (aspiration pneumonia? Sputum cx growing staph aureus, on vanco and zosyn) vs post-MI SIRS response. Currently in cardiogenic shock with IABP in situ    Card  # Cardiogenic shock 4/3- from underlying 3v CAD-has had high SVR and low CI, complicated by ischemic MR  # Due to inferior wall STEMI. S/P 7 stents to LAD, circ, RCA (angiogram report pending). Now with IABP, temporary pacemaker after 3rd deg heart block in cath lab at [facility] on 3/26   # Small pericardial hemorrhage   # Remains unclear why we are having a hard time weaning him off of IABP in the setting of EF of 3-35%. Microvascular disease is a possibility.  - IABP (subclavian) in place  --On nipride and dopamine as of this AM. Improvement in SvO2 after this intervention  - Hold off on isordil for now  - Cortisol normal  - Plan to keep CVP < 10  - Closely monitor hemodynamics (  - Coronary angiogram tomorrow before [facility].      # Neuro  - CVA presenting with left hand weakness and pain  - CTA head/neck shows no hemorrhage but new areas of low attenuation in b/l hemispheres  - Neuro following.  - MAP goal > 70        Respiratory  -Hypoxic today placed on face mask  - Intubated in the setting of worsening hypoxia on 4/25  - Possible causes: aspiration PNA vs PE vs volume overload   - Diuresing to CVP < 10, already on heparin inf for IABP, and being treated with Vanc and Pip/tazo  - Pulmonary consulted,appreciate recs           # ID  - UA 4/15 with cloudy urine, 50 WBCs, many bacteria, no nitrates. UCx grew E coli - treated with CFTX  (sensitive)  - Fever on 4/20 and single spike 100.2 overnight with blood cultures in process (no growth thus far), PICC and SGC removed, started on Vanc and pip/tazo  - 4/25 new fever and concern for aspiration pneumonia, continuing Vanc/Meropenem. ID following         # Endocrine  T2DM: HA1C 7.5 on admission.  - Insulin gtt per nurising protocol with hypoglycemia precautions.   - Switch to SQ Lantus 4/22      # GI  - Had several maroon BMs since 4/11, Hb overall dropped by a 1.5 grams or so, heparin inf was held and GI consulted  - Colonoscopy 4/17 showed a rectal ulcer but no intervenable lesions  - Will continue to monitor Hb, back on heparin inf  - Protonix 40 PO BID         # Renal/  - Daily Cr  - Avoid nephrotoxins as able  - Plan to keep CVP < 10     -Hypernatremia  - Resolved. Stopped his D5W, only getting FWF's.        FEN: feeding tube, and dysphagia level II  Code: FULL  PPx: PPI 40mg BID, senna PRN  Dispo: pending stability    LINES:  - SC IABP  4/19  - LUE PICC   4/20  - Chavis catheter  4/20  - Lt IJ                            4/29        Plan of care discussed with Dr. Alvina Loving MD  Cardiovascular Disease Fellow  Pager: [phone]    Cardiac ICU    1. Extensive premature ASHD with inferior lateral MI and stentining  2. Mitral regurgitation  3. Acute and chronic congestive heart failure  4. Pneumonia requiring transient mechanical ventilation  5. Extended bedrest and frailty  6. Previous IABP usage  7. Malnutrition  8. Anemia  9. Cardiogenic shock requiring:   A. Fluid management   B. Vasopressors   C. Afterload reduction  10. Subclavian balloon pump  11. Bilateral CVA without occlusive thrombus of ICA      The patient remains unstable in the ICU with on-going need for ventilator support, parenteral medications for the adjustment of blood pressure and cardiac output and maintenance of renal function.      The patient is seen for  shock requiring vasopressor and or inotropic agents;  low cardiac output necessitating  inotropic agents, vasopressors and afterload reducing agents; limited mechanical support requiring IABP; acute renal failure requiring fluid and diuretic management; infection requiring antibiotic selection and monitoring, vasopressors, fluid management to maintain blood pressure and secondary organ function    I personally reviewed:    Arterial and venous blood gases to assess acid base balance, oxygenation, and ventilator settings.      Hemodynamic parameters obtained by central hemodynamic monitoring including RAP, estimated LVEDP, pulmonary artery pressure, cardiac output and vascular resistances in order to adjust fluids and infused medications for blood pressure and cardiac output maintenance.    Ventilatory settings and/or supplement oxygen needs in order to obtain optimal oxygenation and electrolyte balance at low possible pressure support and inspired oxygen tension    Volume status, renal function and nutritional support as judged by intake, output, daily weight and appropriate laboratories.    I reviewed individual and serial imaging studies including echocardiogram, chest x-ray, CT scan    I personally supervised the prescription of parenteral fluids, inotropes, vasodilators , and vasopressors in order to correct cardiac output, maintain urine output and renal function.    I personally met with patient or family to discuss current and future diagnostic and therapeutic strategies    I discussed case with interventional cardiology and cardiovascular surgery and neurologic critical care    I have thoroughly reviewed patient's entire course and chart and believe that his best option as described in yesterday's note is MITRACLIP to avoid invasive approach with necessary cardiopulmonary by-pass in this extremely frail individual.

## 2017-04-30 NOTE — PROGRESS NOTES
"[facility]  Cardiology II Service / Advanced Heart Failure  4/29/2017      Interval History:   --CT head with evidence of bilateral lesions, right anterior internal capsule and right frontal periventricular white matter lesions of unknown chronicity, though new from CT on 4/7/17. Consistent with left sided deficits (also of unknown duration  -- He was started on nipride this AM  -This AM, BP soft, started on dopamine (avoided dobutamine since more tachy on dobutamine in the past). Hypoxic on NC        Intake/Output Summary (Last 24 hours) at 04/29/17 2148  Last data filed at 04/29/17 2100   Gross per 24 hour   Intake          1478.99 ml   Output             1340 ml   Net           138.99 ml         Pertinent Medications:  I have reviewed this patient's current medications      NaCl         [START ON 5/1/2017] aspirin  325 mg Oral Once     clopidogrel  75 mg Oral Daily     aspirin EC  81 mg Oral Daily     meropenem  1 g Intravenous Q8H     heparin lock flush  5-10 mL Intracatheter Q24H     QUEtiapine  12.5 mg Oral At Bedtime     pantoprazole  40 mg Per Feeding Tube BID     atorvastatin  40 mg Oral or Feeding Tube Daily at 8 pm     pneumococcal vaccine  0.5 mL Intramuscular Once     sodium chloride (PF)  3 mL Intracatheter Q8H     multivitamins with minerals  15 mL Per Feeding Tube Daily         Examination:  /83  Temp 98.8  F (37.1  C) (Axillary)  Resp 16  Ht 1.575 m (5' 2.01\")  Wt 58.9 kg (129 lb 13.6 oz)  SpO2 97%  BMI 23.74 kg/m2  GEN: Intubated and sedated  NECK: can not assess JVP   RESP: +crackles   CV: S1S2S3, holosystolic murmur at the apex  ABD: Soft, nontender to palpation, no HSM, +BS, no guarding  EXT: No peripheral edema, warm/well perfused   SKIN: Normal skin turgor, no rash     Data:  CMP  Recent Labs  Lab 04/29/17  1537 04/29/17  0430 04/28/17  1714 04/28/17  0438 04/27/17 2026  04/27/17  0433 04/26/17 2039 04/26/17  0400    143 142 142  --   " < > 139  --   < > 142   POTASSIUM 4.1 4.4 3.9 4.8 3.9  < > 4.3 4.5  < > 3.4   CHLORIDE 103 104 103 104  --   < > 102  --   < > 101   CO2 28 26 34* 29  --   < > 31  --   < > 27   ANIONGAP 10 13 6 9  --   < > 6  --   < > 14   * 101* 86 150*  --   < > 153*  --   < > 200*   BUN 40* 37* 34* 33*  --   < > 30  --   < > 27   CR 1.32* 1.32* 1.27* 1.29*  --   < > 1.28*  --   < > 1.36*   GFRESTIMATED 57* 57* 60* 59*  --   < > 60*  --   < > 56*   GFRESTBLACK 70 70 73 71  --   < > 72  --   < > 67   ROB 7.9* 8.1* 8.1* 7.6*  --   < > 7.8*  --   < > 7.5*   MAG 2.7* 2.6* 2.6* 2.6* 2.6*  < > 2.6* 2.4*  < > 2.4*   PHOS  --   --   --  3.9 2.4*  --  2.9 2.8  --  2.1*   PROTTOTAL  --  6.8  --  6.6*  --   --  6.6*  --   --  6.8   ALBUMIN  --  2.0*  --  1.8*  --   --  1.8*  --   --  1.8*   BILITOTAL  --  0.6  --  0.5  --   --  0.4  --   --  0.5   ALKPHOS  --  89  --  86  --   --  75  --   --  66   AST  --  36  --  29  --   --  26  --   --  23   ALT  --  25  --  24  --   --  24  --   --  24   < > = values in this interval not displayed.  CBC    Recent Labs  Lab 04/29/17  0430 04/28/17  0438 04/27/17  0433 04/26/17  0400   WBC 14.9* 14.1* 15.5* 14.9*   RBC 2.64* 2.58* 2.54* 2.64*   HGB 8.0* 7.6* 7.6* 7.9*   HCT 26.4* 26.3* 25.5* 26.2*    102* 100 99   MCH 30.3 29.5 29.9 29.9   MCHC 30.3* 28.9* 29.8* 30.2*   RDW 19.4* 19.0* 18.2* 18.1*    405 355 317     INR  No lab results found in last 7 days.  Arterial Blood Gas    Recent Labs  Lab 04/29/17  1818 04/29/17  1537 04/29/17  1259 04/29/17  1016  04/29/17  0430   PH  --  7.47* 7.50* 7.53*  --  7.51*   PCO2  --  40 39 36  --  38   PO2  --  69* 55* 61*  --  80   HCO3  --  29* 30* 31*  --  31*   O2PER 7L 5L 4L 2L  2L  < > 2L   < > = values in this interval not displayed.    Imaging Studies:    CTA heaf and neck  Impression:   1. Head CTA demonstrates at least moderate stenosis of both cavernous  internal carotid arteries. Diffuse mild to moderate narrowing of the  basilar  artery. Additional areas of mild bilateral M2 segment  narrowing, presumably also related to atherosclerosis.  2. No aneurysm of the major intracranial arteries.   3. Neck CTA demonstrates no carotid artery stenosis. Short segment  mild narrowing of the proximal right V2 segment.  4. No change in probable bilateral cerebral white matter infarcts. No  acute intracranial hemorrhage    Echo 3/27  The visual ejection fraction is estimated at 30-35%.  There is extensive inferior, inferoseptal, and inferolateral wall akinesis.  Basal/mid lateral wall hypokinesis  There is moderate to severe septal hypokinesis.  Septal wall motion abnormality may reflect pacemaker activation.  There is a catheter/pacemaker lead seen in the right ventricle.  The right ventricle is mildly dilated.  Severely decreased right ventricular systolic function  There is no pericardial effusion.  The rhythm was paced.  Compared to the prior study, the LVEF appears somewhat decreased. Septal wall  motion abnormality larger- may reflect, in part, that patient in sinus tach on  prior study, and ventricular paced now. No hemodynamically significant  valvular abnormalities on 2D or color flow imaging     CT chest/abdomen 3/27   IMPRESSION:   1. Small mediastinal hemorrhage, small bilateral pleural effusions  which may be hemorrhagic. Small intraperitoneal hemorrhage. Minimal  pericardial hemorrhage.  2. Minimal pneumoperitoneum in the left paracolic gutter, of unknown  significance. No pneumatosis as clinically questioned.  3. IABP slightly low in position.  4. Bilateral pulmonary dependent consolidations represent compression  atelectasis, however differentials include aspiration.     Cath 3/26 [facility]  SUMMARY:   --Inferior STEMI w/ late presentation. Culprit dictated by complete  heart block, and cardiogenic shock. Emergent echo in the Cath Lab also  shows significant RV dysfunction.  --Three vessel coronary artery disease with diffuse  coronary disease  out to the distal vessels  --Successful POBA of the prox and mid-RCA. Stent was not placed, in  anticipation he may need to be considered for bypass surgery in the  near future  --Insertion of a temporary pacemaker for bradycardia (AVB) and  hypotension  --Insertion of a IABP  --Upper GI bleed consistent with Kaylin-Bonilla     Cath [place] 3/27     CT head 3/28: normal      Echo 3/29  Interpretation Summary  Limited study. Ischemic CM. Moderately (EF 30-35%) reduced left ventricular  function is present. Traced at 32%.  Posterior wall akinesis is present. Lateral wall akinesis is present. Inferior  wall akinesis is present.  Right ventricular function, chamber size, wall motion, and thickness are  normal.  Dilation of the inferior vena cava is present with abnormal respiratory  variation in diameter.     Compared to prior study, RV fxn is improved.     Echo 3/31  Left Ventricle  The Ejection Fraction is estimated at 50-55%. Inferior,posterior,lateral,basal  septal wall akinesis as reported before. No change.     CORONARY ANGIOGRAM 4/1:   1. Both coronary arteries arise from their respective cusps.  2. Right dominant.  3. LM has mild luminal irregularities.   4. LAD supplies the apex along with the RCA (type 2 LAD) and gives rise to septal perforators and a large and branching D1. There are widely patent stents extending from the proximal to mid LAD. The dLAD has mild to moderate disease to 30% stenosis. The D1 is jailed by the LAD stents, but has EBER 3 flow with disease to 30% stenosis.   5. The small ramus is no longer present.  6. LCX is occluded at the ostium.   7. RCA gives rise to PL branches and supplies PDA. There are widely patent stents extending from the proximal to mid RCA. The remainder of the mRCA has disease to 50% stenosis and the dRCA has disease to 10% stenosis. The RPDA has a widely patent stent and the remainder of the artery has mild disease to 10% stenosis. The RPLA has  small vessels with diffuse disease including a distal branch occlusion. The RPLA supplies some small collateral branches to the dLCx.      COMPLICATIONS:  1. None      SUMMARY:   1. Cardiogenic shock following an inferior STEMI.  2. The LCx re-occlusion is not surprising as the recently revascularized LCx  had poor outflow and the revascularized portion did not supply a large territory (limited to no flow was present distal to the stented segment immediately following stenting). Repeat revascularization of the LCx would result in the same outcome of repeat closure and also risk embolizing thrombus from the LCx into the LAD so no treatment of the occluded LCx was performed.   3. Three vessel coronary artery disease with patent LAD and RCA stents and an occluded LCx.     Echo 4/3  Left ventricular size is normal.  The Ejection Fraction is estimated at 35-40%.  Inferior wall akinesis is present.  Lateral wall akinesis is present.  Posterior wall akinesis is present.  Right ventricular function, chamber size, wall motion, and thickness are  normal.  Moderate mitral insufficiency is present.  Moderate tricuspid insufficiency is present.  Right ventricular systolic pressure is 47mmHg above the right atrial pressure.  The inferior vena cava is normal.     Echo 4/5  Moderately (EF 35-40%) reduced left ventricular function is present. Traced at  40%.  Moderate mitral insufficiency is present.  The cause of the mitral insufficiency appears to be restricted posterior  leaflet from posterior MI. No mitral leaflet pathology seen.  Dilation of the inferior vena cava is present with abnormal respiratory  variation in diameter.     CT abd without contrast 4/4  IMPRESSION:   1. No evidence of ischemic bowel, obstruction, or intra-abdominal  infection.  2. Bibasilar patchy pulmonary opacities likely represent combination  of aspiration and atelectasis.  3. Small amount of simple free fluid within the lower abdomen.  4. Small left  retroperitoneal hematoma along the iliac vasculature  likely postprocedural.   5. Unchanged size of bilateral pleural effusions, which now consist of  simple fluid.    Assessment and Plan  51 year old man presented with cardiogenic shock from inferior STEMI s/p RCA aspiration thrombectomy and POBA on 3/26 at [facility] s/p IABP and initiation of Dopamine, also during procedure, CHB s/p temp pacer, emesis/hematmesis and altered mental status s/p intubation transferred here for consideration of mechanical support on 3/27. Had PCI to RCA, LAD and LCx (on ASA and plavix) started on epinephrine in addition to dopamine, post procedure developed distributive shock picture from infection (aspiration pneumonia? Sputum cx growing staph aureus, on vanco and zosyn) vs post-MI SIRS response. Currently in cardiogenic shock with IABP in situ    Card  # Cardiogenic shock 4/3- from underlying 3v CAD-has had high SVR and low CI, complicated by ischemic MR  # Due to inferior wall STEMI. S/P 7 stents to LAD, circ, RCA (angiogram report pending). Now with IABP, temporary pacemaker after 3rd deg heart block in cath lab at [facility] on 3/26   # Small pericardial hemorrhage   # Remains unclear why we are having a hard time weaning him off of IABP in the setting of EF of 3-35%. Microvascular disease is a possibility.  - IABP (subclavian) in place  --On nipride and dopamine as of this AM. Improvement in SvO2 after this intervention  - Hold off on isordil for now  - Cortisol normal  - Plan to keep CVP < 10  - f/u blood cultures  - f/u BAL results  - Closely monitor hemodynamics (currently cardiogenic shock, but given recent fever and concern for aspiration pna, could potentially develop a septic picture overnight)  - Coronary angiogram tomorrow at 9-10AM. Will likely need a MV intervention (MVR v MitraClip) when stable enough (given severe MR on echo, and his reliance on afterload reduction)    # Neuro  -CVA overnight presenting with left hand  weakness and pain         Respiratory  -Hypoxic today placed on face mask  - Intubated in the setting of worsening hypoxia on 4/25  - Possible causes: aspiration PNA vs PE vs volume overload   - Diuresing to CVP < 10, already on heparin inf for IABP, and being treated with Vanc and Pip/tazo  - Pulmonary consulted,appreciate recs  - f/u BAL results         # ID  - UA 4/15 with cloudy urine, 50 WBCs, many bacteria, no nitrates. UCx grew E coli - treated with CFTX (sensitive)  - Fever on 4/20 and single spike 100.2 overnight with blood cultures in process (no growth thus far), PICC and SGC removed, started on Vanc and pip/tazo  - 4/25 new fever and concern for aspiration pneumonia, continuing Vanc/Meropenem. ID following         # Endocrine  T2DM: HA1C 7.5 on admission.  - Insulin gtt per nurising protocol with hypoglycemia precautions.   - Switch to SQ Lantus 4/22      # GI  - Had several maroon BMs since 4/11, Hb overall dropped by a 1.5 grams or so, heparin inf was held and GI consulted  - Colonoscopy 4/17 showed a rectal ulcer but no intervenable lesions  - Will continue to monitor Hb, back on heparin inf  - Protonix 40 PO BID         # Renal/  - Daily Cr  - Avoid nephrotoxins as able  - Plan to keep CVP < 10     -Hypernatremia  - Resolved. Stopped his D5W, only getting FWF's.        FEN: feeding tube, and dysphagia level II  Code: FULL  PPx: PPI 40mg BID, senna PRN  Dispo: pending stability    LINES:  - SC IABP  4/19  - LUE PICC   4/20  - Chavis catheter  4/20  - Lt IJ                            4/29        Plan of care discussed with Dr. Alvina Farah MD  Cardiology Fellow  [phone]        Cardiology Attending    I believe that the patient has a severe mitral regurgitation superimposed upon history of multi-vessel CAD with recent stenting.  The patient has recent aspiration pneumonia requiring intubation.  I believe he is an extremely high risk surgical candidate and that the appropriate  course of action is to attempt MITRACLIP would avoid general anesthesia and cardiopulmonary by-pass.  I discussed the risks and benefits of this approach as well as lack of appropriate alternative therapies with the family with a .

## 2017-04-30 NOTE — PLAN OF CARE
Problem: Goal Outcome Summary  Goal: Goal Outcome Summary  1) pt will be hemodynamically stable  2) pt will tolerate weaning of IABP  3) pain will be adequately controlled     Outcome: Improving  Patient following commands and cooperative, mentation clear. Left sided weakness unchanged throughout shift. MAPs 75-85, Nipride increased to 2 mcg/kg/min for elevated SVR, SVR 1445, 1281, 1198 CI 2.3-2.5 CVP 16-17. 2 mg x2 of Bumex given. Lung sounds coarse, sating high 90son 4L NC. UO 40-50 cc/hr with 150-200cc/hr for the hours after the bumex boluses. Tolerated pureed meal and sips of water and liquids. IABP augmenting  with maps in the low 80. Increased sanguinous drainage noted around IABP and IJ sites, dressings changed. Heparin Xa spot check therapeutic, K replaced x2.   Plan for lyla clip and angiogram tomorrow at 730 AM. Family at bedside and updated of plan. Consent for Monday's procedures to be acquired by MD Monday morning.  Will continue to monitor and follow plan of care.

## 2017-04-30 NOTE — PLAN OF CARE
Problem: Goal Outcome Summary  Goal: Goal Outcome Summary  1) pt will be hemodynamically stable  2) pt will tolerate weaning of IABP  3) pain will be adequately controlled     Outcome: No Change  Patient hemodynamically stable throughout night. 2mg Bumex given for elevated CVP (16) with good response. MAP remained greater than 65; dopamine able to be titrated off. Nipride slowly increased toward end of shift d/t increasing SVR. Cards made aware of hemodynamic numbers. Balloon pump in place; RT made aware of any alarms. Patient intermittently confused throughout night; reorientated frequently. NPO for heart cath.

## 2017-04-30 NOTE — PLAN OF CARE
Problem: Goal Outcome Summary  Goal: Goal Outcome Summary  1) pt will be hemodynamically stable  2) pt will tolerate weaning of IABP  3) pain will be adequately controlled     Outcome: Declining  Lethargic at times- confused to time/date, Dr. Byrne notified. IABP 1:2, SVO2s in 30s this am- Dr. Byrne notified, dopamine started with understanding that would be unable to draw for vbg/cvps off picc line due to both lines in use. Ok to go ahead plan to place IJ later on- triple lumen IJ placed this afternoon on L side- oozing at site, quick clot applied. CVP 11, svo2 with dopamine & nipride on 50 off IJ. HR 100s-120s, BP labile- maps dipping to 40s then recovering on IABP- Dr. Byrne notified, iabp specialist notified.  Augmented diastolic between 60-90 throughout the day.  Lungs coarse icnreasing O2 needs- abg alkalotic- dr. meyer notified. Now on 7L oxy plus max. 1 + pulses. Ct today for L sided weakness- ongoing. Neuro following.  Discussed TF with Dr. Byrne- pt not sustaining 30 degree HOB- tf in stomach- ok for meds while sitting up. Discussed with Dr. byrne not feeling safe about pt eating due to lethargy and increasing O2 needs. Bps dropping this evening- up on dopamine Dr. Yousif notified- questioned if we wanted to go down on nipride? Will discuss with team and get back to Noc RN.   Will continue to monitor and follow plan of care.         Problem: Intra-Aortic Balloon Pump (Adult)  Goal: Signs and Symptoms of Listed Potential Problems Will be Absent or Manageable (Intra-Aortic Balloon Pump)  Signs and symptoms of listed potential problems will be absent or manageable by discharge/transition of care (reference Intra-Aortic Balloon Pump (Adult) CPG).   Outcome: Declining    04/29/17 1944   Intra-Aortic Balloon Pump   Problems Assessed (Intra-Aortic Balloon Pump) all   Problems Present (Intra-Aortic Balloon Pump) pain;hemodynamic instability;inability to wean;mechanical  dysfunction;skin breakdown

## 2017-04-30 NOTE — PROGRESS NOTES
[facility], [place]      Neurology Stroke Consult    Patient Name: Luke Henao  : 1965 MRN#: [id number]    STROKE DATA    Stroke Code:  Stroke code not indicated.  Time patient seen:  2017   Last known normal (pt's baseline):  17    TPA treatment:  Not given due to active bleeding / acute trauma.     National Institutes of Health Stroke Scale (at presentation)  NIHSS done at:  time patient seen      Score    Level of consciousness:  (0)   Alert, keenly responsive     LOC questions:  (0)   Answers both questions correctly    LOC commands:  (0)   Performs both tasks correctly    Best gaze:  (0)   Normal    Visual:  (0)   No visual loss    Facial palsy:  (1)   Minor paralysis (flat nasolabial fold, smile asymmetry)    Motor arm (left):  (1)   Drift    Motor arm (right):  (0)   No drift    Motor leg (left):  (2)   Some effort against gravity    Motor leg (right):  (0)   No drift    Limb ataxia:  (0)   Absent    Sensory:  (0)   Normal- no sensory loss    Best language:  (0)   Normal- no aphasia    Dysarthria:  (0)   Normal    Extinction and inattention:  (0)   No abnormality        NIHSS Total Score:  4        Dysphagia Screen  Unable to complete at this time.     ASSESSMENT & RECOMMENDATIONS          Impression:   Subacute ischemic stroke. Infarct sizes are small, etiology unclear at this point. May be cardiac or artery to artery thromboembolism secondary to large vessel artherosclerosis.     Recommendations:  Acute Ischemic Stroke (without tPA) Plan  - Admit to Neurology  - Neurochecks  - Permissive HTN; labetalol PRN for SBP > 220  - Euthermia, Euglycemia  - Daily aspirin for secondary stroke prevention  - Statin  - Telemetry, EKG  - Bedside Glucose Monitoring  - PT/OT/SLP  - PM&R  - Stroke Education  - Depression Screen  - Apnea Screen    Prophylaxis          For VTE Prevention:  - patient on anticoagulation     The patient will be managed by the SICU team and  followed  by the Stroke Consult service.      Pertinent Past Medical/Surgical History  No past medical history on file.    Past Surgical History:   Procedure Laterality Date     COLONOSCOPY N/A 4/17/2017    Procedure: COLONOSCOPY;  Surgeon: Rashaad Bundy MD;  Location: [facility] GI     INSERT INTRAAORTIC BALLOON PUMP Right 4/19/2017    Procedure: INSERT INTRAAORTIC BALLOON PUMP;  Right Subclavian Intra Aortic Balloon Pump Insertion using Maquet 40cc Ballon Catheter, Implentation of 8mm Gelweave Woven Vascular Prosthesis, Removal of Left Femoral Ballon Pump Catheter, Flouroscopy;  Surgeon: Keshav Leung MD;  Location: [facility] OR       Medications: I have reviewed this patient's current medications.    Allergies: All allergies reviewed and addressed.    Family History: This patient has no significant family history.    Social History: I have reviewed and updated this patient's social history.    Tobacco use: Former smoker.    ROS:  The 10 point Review of Systems is negative other than noted in the HPI or here.    PHYSICAL EXAMINATION  Vital Signs:  B/P: 107/83,  T: 96.6,  P: Data Unavailable,  R: 20    General:  patient lying in bed without any acute distress    HEENT:  normocephalic/atraumatic  Cardio:  RRR  Pulmonary:  no respiratory distress  Abdomen:  soft, non-tender, non-distended  Extremities:  no edema  Skin:  intact, warm/dry     Neurologic  See NIHSS above.    Labs  Labs and Imaging reviewed and used in developing the plan; pertinent results included.     Lab Results   Component Value Date     (H) 04/30/2017       The patient was discussed with the Fellow,    Jesse William  N[facility] fellow.

## 2017-04-30 NOTE — PLAN OF CARE
Problem: Goal Outcome Summary  Goal: Goal Outcome Summary  1) pt will be hemodynamically stable  2) pt will tolerate weaning of IABP  3) pain will be adequately controlled        OT-4e: Pt performed B LE AAROM/AROM and L UE AAROM from supine. L UE/LE weaker now, and L UE ROM limited by pain near elbow. Mod A for supine>sit, tolerating 5+ EOB sitting with CGA, though dizzy and fatigued. Rec d/c to TCU.

## 2017-05-01 ENCOUNTER — APPOINTMENT (OUTPATIENT)
Dept: CARDIOLOGY | Facility: CLINIC | Age: 52
DRG: 228 | End: 2017-05-01
Attending: INTERNAL MEDICINE
Payer: COMMERCIAL

## 2017-05-01 ENCOUNTER — APPOINTMENT (OUTPATIENT)
Dept: GENERAL RADIOLOGY | Facility: CLINIC | Age: 52
DRG: 228 | End: 2017-05-01
Attending: INTERNAL MEDICINE
Payer: COMMERCIAL

## 2017-05-01 ENCOUNTER — ANESTHESIA EVENT (OUTPATIENT)
Dept: SURGERY | Facility: CLINIC | Age: 52
DRG: 228 | End: 2017-05-01
Payer: COMMERCIAL

## 2017-05-01 ENCOUNTER — ANESTHESIA (OUTPATIENT)
Dept: SURGERY | Facility: CLINIC | Age: 52
DRG: 228 | End: 2017-05-01
Payer: COMMERCIAL

## 2017-05-01 ENCOUNTER — HOSPITAL ENCOUNTER (INPATIENT)
Dept: CARDIOLOGY | Facility: CLINIC | Age: 52
DRG: 228 | End: 2017-05-01
Attending: INTERNAL MEDICINE | Admitting: INTERNAL MEDICINE
Payer: COMMERCIAL

## 2017-05-01 ENCOUNTER — OFFICE VISIT (OUTPATIENT)
Dept: INTERPRETER SERVICES | Facility: CLINIC | Age: 52
End: 2017-05-01

## 2017-05-01 DIAGNOSIS — I34.0 MITRAL VALVE INSUFFICIENCY, UNSPECIFIED ETIOLOGY: ICD-10-CM

## 2017-05-01 LAB
ABO + RH BLD: NORMAL
ABO + RH BLD: NORMAL
ALBUMIN SERPL-MCNC: 2.1 G/DL (ref 3.4–5)
ALBUMIN SERPL-MCNC: 2.5 G/DL (ref 3.4–5)
ALP SERPL-CCNC: 71 U/L (ref 40–150)
ALP SERPL-CCNC: 86 U/L (ref 40–150)
ALT SERPL W P-5'-P-CCNC: 21 U/L (ref 0–70)
ALT SERPL W P-5'-P-CCNC: 27 U/L (ref 0–70)
ANION GAP SERPL CALCULATED.3IONS-SCNC: 8 MMOL/L (ref 3–14)
ANION GAP SERPL CALCULATED.3IONS-SCNC: 9 MMOL/L (ref 3–14)
ANION GAP SERPL CALCULATED.3IONS-SCNC: 9 MMOL/L (ref 3–14)
APTT PPP: 53 SEC (ref 22–37)
AST SERPL W P-5'-P-CCNC: 31 U/L (ref 0–45)
AST SERPL W P-5'-P-CCNC: 39 U/L (ref 0–45)
BACTERIA SPEC CULT: NO GROWTH
BASE DEFICIT BLDA-SCNC: 0.5 MMOL/L
BASE DEFICIT BLDA-SCNC: 6.6 MMOL/L
BASE DEFICIT BLDA-SCNC: NORMAL MMOL/L
BASE EXCESS BLDA CALC-SCNC: 1.1 MMOL/L
BASE EXCESS BLDA CALC-SCNC: 1.3 MMOL/L
BASE EXCESS BLDA CALC-SCNC: 2.3 MMOL/L
BASE EXCESS BLDA CALC-SCNC: 2.6 MMOL/L
BASE EXCESS BLDA CALC-SCNC: NORMAL MMOL/L
BASE EXCESS BLDV CALC-SCNC: 0.5 MMOL/L
BASE EXCESS BLDV CALC-SCNC: 2.4 MMOL/L
BASE EXCESS BLDV CALC-SCNC: 3.9 MMOL/L
BASE EXCESS BLDV CALC-SCNC: 4.7 MMOL/L
BILIRUB DIRECT SERPL-MCNC: 0.1 MG/DL (ref 0–0.2)
BILIRUB SERPL-MCNC: 0.4 MG/DL (ref 0.2–1.3)
BILIRUB SERPL-MCNC: 0.6 MG/DL (ref 0.2–1.3)
BLD GP AB SCN SERPL QL: NORMAL
BLD PROD TYP BPU: NORMAL
BLD UNIT ID BPU: 0
BLOOD BANK CMNT PATIENT-IMP: NORMAL
BLOOD PRODUCT CODE: NORMAL
BPU ID: NORMAL
BUN SERPL-MCNC: 41 MG/DL (ref 7–30)
CA-I BLD-MCNC: 4.3 MG/DL (ref 4.4–5.2)
CA-I BLD-MCNC: 4.4 MG/DL (ref 4.4–5.2)
CA-I BLD-MCNC: 4.4 MG/DL (ref 4.4–5.2)
CALCIUM SERPL-MCNC: 7.6 MG/DL (ref 8.5–10.1)
CALCIUM SERPL-MCNC: 7.7 MG/DL (ref 8.5–10.1)
CALCIUM SERPL-MCNC: 8.3 MG/DL (ref 8.5–10.1)
CHLORIDE SERPL-SCNC: 105 MMOL/L (ref 94–109)
CHLORIDE SERPL-SCNC: 107 MMOL/L (ref 94–109)
CHLORIDE SERPL-SCNC: 107 MMOL/L (ref 94–109)
CK SERPL-CCNC: 218 U/L (ref 30–300)
CO2 SERPL-SCNC: 26 MMOL/L (ref 20–32)
CO2 SERPL-SCNC: 27 MMOL/L (ref 20–32)
CO2 SERPL-SCNC: 28 MMOL/L (ref 20–32)
CREAT SERPL-MCNC: 1.4 MG/DL (ref 0.66–1.25)
CREAT SERPL-MCNC: 1.48 MG/DL (ref 0.66–1.25)
CREAT SERPL-MCNC: 1.52 MG/DL (ref 0.66–1.25)
CRP SERPL-MCNC: 33 MG/L (ref 0–8)
ERYTHROCYTE [DISTWIDTH] IN BLOOD BY AUTOMATED COUNT: 19.9 % (ref 10–15)
ERYTHROCYTE [DISTWIDTH] IN BLOOD BY AUTOMATED COUNT: 20.4 % (ref 10–15)
GFR SERPL CREATININE-BSD FRML MDRD: 49 ML/MIN/1.7M2
GFR SERPL CREATININE-BSD FRML MDRD: 50 ML/MIN/1.7M2
GFR SERPL CREATININE-BSD FRML MDRD: 54 ML/MIN/1.7M2
GLUCOSE BLD-MCNC: 111 MG/DL (ref 70–99)
GLUCOSE BLD-MCNC: 122 MG/DL (ref 70–99)
GLUCOSE BLDC GLUCOMTR-MCNC: 111 MG/DL (ref 70–99)
GLUCOSE BLDC GLUCOMTR-MCNC: 112 MG/DL (ref 70–99)
GLUCOSE BLDC GLUCOMTR-MCNC: 113 MG/DL (ref 70–99)
GLUCOSE BLDC GLUCOMTR-MCNC: 122 MG/DL (ref 70–99)
GLUCOSE BLDC GLUCOMTR-MCNC: 134 MG/DL (ref 70–99)
GLUCOSE BLDC GLUCOMTR-MCNC: 145 MG/DL (ref 70–99)
GLUCOSE BLDC GLUCOMTR-MCNC: 149 MG/DL (ref 70–99)
GLUCOSE BLDC GLUCOMTR-MCNC: 151 MG/DL (ref 70–99)
GLUCOSE BLDC GLUCOMTR-MCNC: 92 MG/DL (ref 70–99)
GLUCOSE SERPL-MCNC: 105 MG/DL (ref 70–99)
GLUCOSE SERPL-MCNC: 115 MG/DL (ref 70–99)
GLUCOSE SERPL-MCNC: 127 MG/DL (ref 70–99)
HCO3 BLD-SCNC: 20 MMOL/L (ref 21–28)
HCO3 BLD-SCNC: 24 MMOL/L (ref 21–28)
HCO3 BLD-SCNC: 26 MMOL/L (ref 21–28)
HCO3 BLD-SCNC: 27 MMOL/L (ref 21–28)
HCO3 BLD-SCNC: NORMAL MMOL/L (ref 21–28)
HCO3 BLDV-SCNC: 26 MMOL/L (ref 21–28)
HCO3 BLDV-SCNC: 28 MMOL/L (ref 21–28)
HCO3 BLDV-SCNC: 29 MMOL/L (ref 21–28)
HCO3 BLDV-SCNC: 30 MMOL/L (ref 21–28)
HCT VFR BLD AUTO: 25.7 % (ref 40–53)
HCT VFR BLD AUTO: 28.4 % (ref 40–53)
HGB BLD-MCNC: 7.2 G/DL (ref 13.3–17.7)
HGB BLD-MCNC: 7.4 G/DL (ref 13.3–17.7)
HGB BLD-MCNC: 7.6 G/DL (ref 13.3–17.7)
HGB BLD-MCNC: 7.7 G/DL (ref 13.3–17.7)
HGB BLD-MCNC: 8.4 G/DL (ref 13.3–17.7)
KCT BLD-ACNC: 173 SEC (ref 105–167)
KCT BLD-ACNC: 177 SEC (ref 105–167)
KCT BLD-ACNC: 192 SEC (ref 105–167)
KCT BLD-ACNC: 238 SEC (ref 105–167)
KCT BLD-ACNC: 242 SEC (ref 105–167)
KCT BLD-ACNC: 268 SEC (ref 105–167)
KCT BLD-ACNC: 280 SEC (ref 105–167)
KCT BLD-ACNC: 291 SEC (ref 105–167)
LMWH PPP CHRO-ACNC: 0.11 IU/ML
MAGNESIUM SERPL-MCNC: 2.4 MG/DL (ref 1.6–2.3)
MAGNESIUM SERPL-MCNC: 2.7 MG/DL (ref 1.6–2.3)
MAGNESIUM SERPL-MCNC: 2.8 MG/DL (ref 1.6–2.3)
MCH RBC QN AUTO: 29.2 PG (ref 26.5–33)
MCH RBC QN AUTO: 30.3 PG (ref 26.5–33)
MCHC RBC AUTO-ENTMCNC: 28.8 G/DL (ref 31.5–36.5)
MCHC RBC AUTO-ENTMCNC: 29.6 G/DL (ref 31.5–36.5)
MCV RBC AUTO: 102 FL (ref 78–100)
MCV RBC AUTO: 103 FL (ref 78–100)
MICRO REPORT STATUS: NORMAL
NUM BPU REQUESTED: 4
O2/TOTAL GAS SETTING VFR VENT: 30 %
O2/TOTAL GAS SETTING VFR VENT: 50 %
O2/TOTAL GAS SETTING VFR VENT: 50 %
O2/TOTAL GAS SETTING VFR VENT: 54 %
O2/TOTAL GAS SETTING VFR VENT: 55 %
O2/TOTAL GAS SETTING VFR VENT: ABNORMAL %
O2/TOTAL GAS SETTING VFR VENT: NORMAL %
OXYHGB MFR BLD: 94 % (ref 92–100)
OXYHGB MFR BLD: 95 % (ref 92–100)
OXYHGB MFR BLD: 98 % (ref 92–100)
OXYHGB MFR BLD: NORMAL % (ref 92–100)
OXYHGB MFR BLDV: 45 %
OXYHGB MFR BLDV: 51 %
OXYHGB MFR BLDV: 53 %
OXYHGB MFR BLDV: 61 %
PCO2 BLD: 34 MM HG (ref 35–45)
PCO2 BLD: 36 MM HG (ref 35–45)
PCO2 BLD: 37 MM HG (ref 35–45)
PCO2 BLD: 38 MM HG (ref 35–45)
PCO2 BLD: 40 MM HG (ref 35–45)
PCO2 BLD: 48 MM HG (ref 35–45)
PCO2 BLD: NORMAL MM HG (ref 35–45)
PCO2 BLDV: 44 MM HG (ref 40–50)
PCO2 BLDV: 44 MM HG (ref 40–50)
PCO2 BLDV: 45 MM HG (ref 40–50)
PCO2 BLDV: 46 MM HG (ref 40–50)
PH BLD: 7.23 PH (ref 7.35–7.45)
PH BLD: 7.42 PH (ref 7.35–7.45)
PH BLD: 7.45 PH (ref 7.35–7.45)
PH BLD: 7.47 PH (ref 7.35–7.45)
PH BLD: NORMAL PH (ref 7.35–7.45)
PH BLDV: 7.38 PH (ref 7.32–7.43)
PH BLDV: 7.39 PH (ref 7.32–7.43)
PH BLDV: 7.42 PH (ref 7.32–7.43)
PH BLDV: 7.42 PH (ref 7.32–7.43)
PHOSPHATE SERPL-MCNC: 3.7 MG/DL (ref 2.5–4.5)
PLATELET # BLD AUTO: 436 10E9/L (ref 150–450)
PLATELET # BLD AUTO: 545 10E9/L (ref 150–450)
PO2 BLD: 144 MM HG (ref 80–105)
PO2 BLD: 171 MM HG (ref 80–105)
PO2 BLD: 180 MM HG (ref 80–105)
PO2 BLD: 79 MM HG (ref 80–105)
PO2 BLD: 89 MM HG (ref 80–105)
PO2 BLD: 98 MM HG (ref 80–105)
PO2 BLD: NORMAL MM HG (ref 80–105)
PO2 BLDV: 32 MM HG (ref 25–47)
PO2 BLDV: 33 MM HG (ref 25–47)
PO2 BLDV: 34 MM HG (ref 25–47)
PO2 BLDV: 39 MM HG (ref 25–47)
POTASSIUM BLD-SCNC: 3.9 MMOL/L (ref 3.4–5.3)
POTASSIUM BLD-SCNC: 4 MMOL/L (ref 3.4–5.3)
POTASSIUM SERPL-SCNC: 3.4 MMOL/L (ref 3.4–5.3)
POTASSIUM SERPL-SCNC: 3.8 MMOL/L (ref 3.4–5.3)
POTASSIUM SERPL-SCNC: 4 MMOL/L (ref 3.4–5.3)
POTASSIUM SERPL-SCNC: 4.4 MMOL/L (ref 3.4–5.3)
PROT SERPL-MCNC: 6.6 G/DL (ref 6.8–8.8)
PROT SERPL-MCNC: 7.5 G/DL (ref 6.8–8.8)
RBC # BLD AUTO: 2.53 10E12/L (ref 4.4–5.9)
RBC # BLD AUTO: 2.77 10E12/L (ref 4.4–5.9)
SODIUM BLD-SCNC: 141 MMOL/L (ref 133–144)
SODIUM BLD-SCNC: 142 MMOL/L (ref 133–144)
SODIUM SERPL-SCNC: 141 MMOL/L (ref 133–144)
SODIUM SERPL-SCNC: 143 MMOL/L (ref 133–144)
SODIUM SERPL-SCNC: 143 MMOL/L (ref 133–144)
SPECIMEN EXP DATE BLD: NORMAL
SPECIMEN SOURCE: NORMAL
TRANSFUSION STATUS PATIENT QL: NORMAL
WBC # BLD AUTO: 10.6 10E9/L (ref 4–11)
WBC # BLD AUTO: 12.8 10E9/L (ref 4–11)

## 2017-05-01 PROCEDURE — P9016 RBC LEUKOCYTES REDUCED: HCPCS | Performed by: INTERNAL MEDICINE

## 2017-05-01 PROCEDURE — 25000128 H RX IP 250 OP 636: Performed by: INTERNAL MEDICINE

## 2017-05-01 PROCEDURE — 27210742 ZZH CATH CR1

## 2017-05-01 PROCEDURE — 25000125 ZZHC RX 250: Performed by: NURSE ANESTHETIST, CERTIFIED REGISTERED

## 2017-05-01 PROCEDURE — 40000014 ZZH STATISTIC ARTERIAL MONITORING DAILY

## 2017-05-01 PROCEDURE — 25000132 ZZH RX MED GY IP 250 OP 250 PS 637

## 2017-05-01 PROCEDURE — 25000125 ZZHC RX 250

## 2017-05-01 PROCEDURE — S0171 BUMETANIDE 0.5 MG: HCPCS

## 2017-05-01 PROCEDURE — 25000132 ZZH RX MED GY IP 250 OP 250 PS 637: Performed by: STUDENT IN AN ORGANIZED HEALTH CARE EDUCATION/TRAINING PROGRAM

## 2017-05-01 PROCEDURE — 99233 SBSQ HOSP IP/OBS HIGH 50: CPT | Mod: 25 | Performed by: INTERNAL MEDICINE

## 2017-05-01 PROCEDURE — 93355 ECHO TRANSESOPHAGEAL (TEE): CPT | Performed by: INTERNAL MEDICINE

## 2017-05-01 PROCEDURE — C1893 INTRO/SHEATH, FIXED,NON-PEEL: HCPCS

## 2017-05-01 PROCEDURE — 83735 ASSAY OF MAGNESIUM: CPT | Performed by: INTERNAL MEDICINE

## 2017-05-01 PROCEDURE — 85347 COAGULATION TIME ACTIVATED: CPT

## 2017-05-01 PROCEDURE — 33418 REPAIR TCAT MITRAL VALVE: CPT | Mod: 62 | Performed by: INTERNAL MEDICINE

## 2017-05-01 PROCEDURE — 00000146 ZZHCL STATISTIC GLUCOSE BY METER IP

## 2017-05-01 PROCEDURE — 99211 OFF/OP EST MAY X REQ PHY/QHP: CPT

## 2017-05-01 PROCEDURE — 86850 RBC ANTIBODY SCREEN: CPT | Performed by: INTERNAL MEDICINE

## 2017-05-01 PROCEDURE — 27210787 ZZH MANIFOLD CR2

## 2017-05-01 PROCEDURE — 25800025 ZZH RX 258: Performed by: NURSE ANESTHETIST, CERTIFIED REGISTERED

## 2017-05-01 PROCEDURE — 80053 COMPREHEN METABOLIC PANEL: CPT | Performed by: INTERNAL MEDICINE

## 2017-05-01 PROCEDURE — 80076 HEPATIC FUNCTION PANEL: CPT | Performed by: INTERNAL MEDICINE

## 2017-05-01 PROCEDURE — 84132 ASSAY OF SERUM POTASSIUM: CPT | Performed by: INTERNAL MEDICINE

## 2017-05-01 PROCEDURE — 25000125 ZZHC RX 250: Performed by: STUDENT IN AN ORGANIZED HEALTH CARE EDUCATION/TRAINING PROGRAM

## 2017-05-01 PROCEDURE — 27210946 ZZH KIT HC TOTES DISP CR8

## 2017-05-01 PROCEDURE — 84295 ASSAY OF SERUM SODIUM: CPT | Performed by: INTERNAL MEDICINE

## 2017-05-01 PROCEDURE — 27810378 ZZH KIT, MITRACLIP & DELIVERY SYSTEM

## 2017-05-01 PROCEDURE — 82805 BLOOD GASES W/O2 SATURATION: CPT | Performed by: INTERNAL MEDICINE

## 2017-05-01 PROCEDURE — 85520 HEPARIN ASSAY: CPT | Performed by: INTERNAL MEDICINE

## 2017-05-01 PROCEDURE — S0171 BUMETANIDE 0.5 MG: HCPCS | Performed by: STUDENT IN AN ORGANIZED HEALTH CARE EDUCATION/TRAINING PROGRAM

## 2017-05-01 PROCEDURE — 27210956 ZZH BALLOON TIP PRESSURE CR7

## 2017-05-01 PROCEDURE — T1013 SIGN LANG/ORAL INTERPRETER: HCPCS | Mod: U3

## 2017-05-01 PROCEDURE — B2111ZZ FLUOROSCOPY OF MULTIPLE CORONARY ARTERIES USING LOW OSMOLAR CONTRAST: ICD-10-PCS | Performed by: INTERNAL MEDICINE

## 2017-05-01 PROCEDURE — 71010 XR CHEST PORT 1 VW: CPT

## 2017-05-01 PROCEDURE — C1894 INTRO/SHEATH, NON-LASER: HCPCS

## 2017-05-01 PROCEDURE — 27211089 ZZH KIT ACIST INJECTOR CR3

## 2017-05-01 PROCEDURE — 33418 REPAIR TCAT MITRAL VALVE: CPT

## 2017-05-01 PROCEDURE — 82330 ASSAY OF CALCIUM: CPT | Performed by: INTERNAL MEDICINE

## 2017-05-01 PROCEDURE — 25000128 H RX IP 250 OP 636: Performed by: NURSE ANESTHETIST, CERTIFIED REGISTERED

## 2017-05-01 PROCEDURE — C1769 GUIDE WIRE: HCPCS

## 2017-05-01 PROCEDURE — 40000275 ZZH STATISTIC RCP TIME EA 10 MIN

## 2017-05-01 PROCEDURE — 93355 ECHO TRANSESOPHAGEAL (TEE): CPT

## 2017-05-01 PROCEDURE — 40000982 ZZH STATISTICAL HAIKU-CANTO PHOTO

## 2017-05-01 PROCEDURE — 84100 ASSAY OF PHOSPHORUS: CPT | Performed by: INTERNAL MEDICINE

## 2017-05-01 PROCEDURE — 25000132 ZZH RX MED GY IP 250 OP 250 PS 637: Performed by: INTERNAL MEDICINE

## 2017-05-01 PROCEDURE — 25000128 H RX IP 250 OP 636: Performed by: STUDENT IN AN ORGANIZED HEALTH CARE EDUCATION/TRAINING PROGRAM

## 2017-05-01 PROCEDURE — 44500 INTRO GASTROINTESTINAL TUBE: CPT

## 2017-05-01 PROCEDURE — 93005 ELECTROCARDIOGRAM TRACING: CPT

## 2017-05-01 PROCEDURE — 80048 BASIC METABOLIC PNL TOTAL CA: CPT | Performed by: INTERNAL MEDICINE

## 2017-05-01 PROCEDURE — C1760 CLOSURE DEV, VASC: HCPCS

## 2017-05-01 PROCEDURE — 40000196 ZZH STATISTIC RAPCV CVP MONITORING

## 2017-05-01 PROCEDURE — 86900 BLOOD TYPING SEROLOGIC ABO: CPT | Performed by: INTERNAL MEDICINE

## 2017-05-01 PROCEDURE — 85730 THROMBOPLASTIN TIME PARTIAL: CPT | Performed by: INTERNAL MEDICINE

## 2017-05-01 PROCEDURE — 40000076 ZZH STATISTIC IABP MONITORING

## 2017-05-01 PROCEDURE — 86923 COMPATIBILITY TEST ELECTRIC: CPT | Performed by: INTERNAL MEDICINE

## 2017-05-01 PROCEDURE — 25000125 ZZHC RX 250: Performed by: INTERNAL MEDICINE

## 2017-05-01 PROCEDURE — 25000565 ZZH ISOFLURANE, EA 15 MIN: Performed by: INTERNAL MEDICINE

## 2017-05-01 PROCEDURE — 40000281 ZZH STATISTIC TRANSPORT TIME EA 15 MIN

## 2017-05-01 PROCEDURE — 37000008 ZZH ANESTHESIA TECHNICAL FEE, 1ST 30 MIN: Performed by: INTERNAL MEDICINE

## 2017-05-01 PROCEDURE — 37000009 ZZH ANESTHESIA TECHNICAL FEE, EACH ADDTL 15 MIN: Performed by: INTERNAL MEDICINE

## 2017-05-01 PROCEDURE — 93010 ELECTROCARDIOGRAM REPORT: CPT | Performed by: INTERNAL MEDICINE

## 2017-05-01 PROCEDURE — 85027 COMPLETE CBC AUTOMATED: CPT | Performed by: INTERNAL MEDICINE

## 2017-05-01 PROCEDURE — 86901 BLOOD TYPING SEROLOGIC RH(D): CPT | Performed by: INTERNAL MEDICINE

## 2017-05-01 PROCEDURE — 27210807 ZZH SHEATH CR6

## 2017-05-01 PROCEDURE — 82803 BLOOD GASES ANY COMBINATION: CPT | Performed by: INTERNAL MEDICINE

## 2017-05-01 PROCEDURE — 85018 HEMOGLOBIN: CPT | Performed by: INTERNAL MEDICINE

## 2017-05-01 PROCEDURE — 27211157 ZZH NEEDLE BRF TRANSEPTAL CR15

## 2017-05-01 PROCEDURE — 82947 ASSAY GLUCOSE BLOOD QUANT: CPT | Performed by: INTERNAL MEDICINE

## 2017-05-01 PROCEDURE — 82330 ASSAY OF CALCIUM: CPT

## 2017-05-01 PROCEDURE — 93454 CORONARY ARTERY ANGIO S&I: CPT

## 2017-05-01 PROCEDURE — 20000004 ZZH R&B ICU UMMC

## 2017-05-01 PROCEDURE — 82803 BLOOD GASES ANY COMBINATION: CPT

## 2017-05-01 PROCEDURE — 25000128 H RX IP 250 OP 636

## 2017-05-01 PROCEDURE — 86140 C-REACTIVE PROTEIN: CPT | Performed by: INTERNAL MEDICINE

## 2017-05-01 PROCEDURE — 82550 ASSAY OF CK (CPK): CPT | Performed by: INTERNAL MEDICINE

## 2017-05-01 PROCEDURE — 02UG3JZ SUPPLEMENT MITRAL VALVE WITH SYNTHETIC SUBSTITUTE, PERCUTANEOUS APPROACH: ICD-10-PCS | Performed by: INTERNAL MEDICINE

## 2017-05-01 RX ORDER — HYDRALAZINE HYDROCHLORIDE 25 MG/1
25 TABLET, FILM COATED ORAL ONCE
Status: COMPLETED | OUTPATIENT
Start: 2017-05-01 | End: 2017-05-01

## 2017-05-01 RX ORDER — LIDOCAINE 40 MG/G
CREAM TOPICAL
Status: DISCONTINUED | OUTPATIENT
Start: 2017-05-01 | End: 2017-05-01

## 2017-05-01 RX ORDER — FENTANYL CITRATE 50 UG/ML
25-50 INJECTION, SOLUTION INTRAMUSCULAR; INTRAVENOUS
Status: DISCONTINUED | OUTPATIENT
Start: 2017-05-01 | End: 2017-05-01

## 2017-05-01 RX ORDER — HYDRALAZINE HYDROCHLORIDE 25 MG/1
25 TABLET, FILM COATED ORAL EVERY 6 HOURS
Status: DISCONTINUED | OUTPATIENT
Start: 2017-05-01 | End: 2017-05-02

## 2017-05-01 RX ORDER — HEPARIN SODIUM 1000 [USP'U]/ML
INJECTION, SOLUTION INTRAVENOUS; SUBCUTANEOUS PRN
Status: DISCONTINUED | OUTPATIENT
Start: 2017-05-01 | End: 2017-05-01

## 2017-05-01 RX ORDER — ATROPINE SULFATE 0.1 MG/ML
0.5 INJECTION INTRAVENOUS EVERY 5 MIN PRN
Status: DISCONTINUED | OUTPATIENT
Start: 2017-05-01 | End: 2017-05-01

## 2017-05-01 RX ORDER — IOPAMIDOL 755 MG/ML
97 INJECTION, SOLUTION INTRAVASCULAR ONCE
Status: COMPLETED | OUTPATIENT
Start: 2017-05-01 | End: 2017-05-01

## 2017-05-01 RX ORDER — BUMETANIDE 0.25 MG/ML
2 INJECTION INTRAMUSCULAR; INTRAVENOUS ONCE
Status: COMPLETED | OUTPATIENT
Start: 2017-05-01 | End: 2017-05-01

## 2017-05-01 RX ORDER — FLUMAZENIL 0.1 MG/ML
0.2 INJECTION, SOLUTION INTRAVENOUS
Status: ACTIVE | OUTPATIENT
Start: 2017-05-01 | End: 2017-05-02

## 2017-05-01 RX ORDER — SODIUM CHLORIDE 9 MG/ML
INJECTION, SOLUTION INTRAVENOUS CONTINUOUS PRN
Status: DISCONTINUED | OUTPATIENT
Start: 2017-05-01 | End: 2017-05-01

## 2017-05-01 RX ORDER — NALOXONE HYDROCHLORIDE 0.4 MG/ML
.1-.4 INJECTION, SOLUTION INTRAMUSCULAR; INTRAVENOUS; SUBCUTANEOUS
Status: DISCONTINUED | OUTPATIENT
Start: 2017-05-01 | End: 2017-05-01

## 2017-05-01 RX ORDER — ATROPINE SULFATE 0.1 MG/ML
0.5 INJECTION INTRAVENOUS EVERY 5 MIN PRN
Status: DISPENSED | OUTPATIENT
Start: 2017-05-01 | End: 2017-05-02

## 2017-05-01 RX ORDER — HEPARIN SODIUM 10000 [USP'U]/100ML
0-3500 INJECTION, SOLUTION INTRAVENOUS CONTINUOUS
Status: DISCONTINUED | OUTPATIENT
Start: 2017-05-01 | End: 2017-05-07

## 2017-05-01 RX ORDER — CLOPIDOGREL BISULFATE 75 MG/1
75 TABLET ORAL DAILY
Status: DISCONTINUED | OUTPATIENT
Start: 2017-05-02 | End: 2017-05-01

## 2017-05-01 RX ORDER — FENTANYL CITRATE 50 UG/ML
25-50 INJECTION, SOLUTION INTRAMUSCULAR; INTRAVENOUS
Status: DISPENSED | OUTPATIENT
Start: 2017-05-01 | End: 2017-05-02

## 2017-05-01 RX ORDER — CEFAZOLIN SODIUM 2 G/100ML
2 INJECTION, SOLUTION INTRAVENOUS
Status: DISCONTINUED | OUTPATIENT
Start: 2017-05-01 | End: 2017-05-01 | Stop reason: HOSPADM

## 2017-05-01 RX ORDER — ASPIRIN 81 MG/1
81 TABLET ORAL DAILY
Status: DISCONTINUED | OUTPATIENT
Start: 2017-05-02 | End: 2017-05-01

## 2017-05-01 RX ORDER — SODIUM CHLORIDE 9 MG/ML
INJECTION, SOLUTION INTRAVENOUS
Status: COMPLETED
Start: 2017-05-01 | End: 2017-05-01

## 2017-05-01 RX ORDER — ASPIRIN 325 MG
325 TABLET ORAL ONCE
Status: DISCONTINUED | OUTPATIENT
Start: 2017-05-01 | End: 2017-05-01 | Stop reason: DRUGHIGH

## 2017-05-01 RX ORDER — HYDRALAZINE HYDROCHLORIDE 20 MG/ML
20 INJECTION INTRAMUSCULAR; INTRAVENOUS ONCE
Status: COMPLETED | OUTPATIENT
Start: 2017-05-01 | End: 2017-05-01

## 2017-05-01 RX ORDER — NITROGLYCERIN 0.4 MG/1
0.4 TABLET SUBLINGUAL EVERY 5 MIN PRN
Status: DISCONTINUED | OUTPATIENT
Start: 2017-05-01 | End: 2017-05-12 | Stop reason: HOSPADM

## 2017-05-01 RX ORDER — SODIUM CHLORIDE 9 MG/ML
INJECTION, SOLUTION INTRAVENOUS CONTINUOUS
Status: DISCONTINUED | OUTPATIENT
Start: 2017-05-01 | End: 2017-05-01 | Stop reason: HOSPADM

## 2017-05-01 RX ORDER — LIDOCAINE 40 MG/G
CREAM TOPICAL
Status: DISCONTINUED | OUTPATIENT
Start: 2017-05-01 | End: 2017-05-12 | Stop reason: HOSPADM

## 2017-05-01 RX ORDER — SODIUM CHLORIDE, SODIUM LACTATE, POTASSIUM CHLORIDE, CALCIUM CHLORIDE 600; 310; 30; 20 MG/100ML; MG/100ML; MG/100ML; MG/100ML
INJECTION, SOLUTION INTRAVENOUS CONTINUOUS PRN
Status: DISCONTINUED | OUTPATIENT
Start: 2017-05-01 | End: 2017-05-01

## 2017-05-01 RX ORDER — FLUMAZENIL 0.1 MG/ML
0.2 INJECTION, SOLUTION INTRAVENOUS
Status: DISCONTINUED | OUTPATIENT
Start: 2017-05-01 | End: 2017-05-01

## 2017-05-01 RX ORDER — NALOXONE HYDROCHLORIDE 0.4 MG/ML
.2-.4 INJECTION, SOLUTION INTRAMUSCULAR; INTRAVENOUS; SUBCUTANEOUS
Status: DISCONTINUED | OUTPATIENT
Start: 2017-05-01 | End: 2017-05-01

## 2017-05-01 RX ORDER — EPHEDRINE SULFATE 50 MG/ML
INJECTION, SOLUTION INTRAMUSCULAR; INTRAVENOUS; SUBCUTANEOUS PRN
Status: DISCONTINUED | OUTPATIENT
Start: 2017-05-01 | End: 2017-05-01

## 2017-05-01 RX ORDER — PROPOFOL 10 MG/ML
INJECTION, EMULSION INTRAVENOUS PRN
Status: DISCONTINUED | OUTPATIENT
Start: 2017-05-01 | End: 2017-05-01

## 2017-05-01 RX ORDER — ASPIRIN 81 MG/1
81 TABLET, CHEWABLE ORAL DAILY
Status: DISCONTINUED | OUTPATIENT
Start: 2017-05-02 | End: 2017-05-08

## 2017-05-01 RX ORDER — NALOXONE HYDROCHLORIDE 0.4 MG/ML
.1-.4 INJECTION, SOLUTION INTRAMUSCULAR; INTRAVENOUS; SUBCUTANEOUS
Status: DISCONTINUED | OUTPATIENT
Start: 2017-05-01 | End: 2017-05-12 | Stop reason: HOSPADM

## 2017-05-01 RX ORDER — PROPOFOL 10 MG/ML
INJECTION, EMULSION INTRAVENOUS CONTINUOUS PRN
Status: DISCONTINUED | OUTPATIENT
Start: 2017-05-01 | End: 2017-05-01

## 2017-05-01 RX ORDER — FENTANYL CITRATE 50 UG/ML
INJECTION, SOLUTION INTRAMUSCULAR; INTRAVENOUS PRN
Status: DISCONTINUED | OUTPATIENT
Start: 2017-05-01 | End: 2017-05-01

## 2017-05-01 RX ORDER — CEFAZOLIN SODIUM 1 G/3ML
INJECTION, POWDER, FOR SOLUTION INTRAMUSCULAR; INTRAVENOUS PRN
Status: DISCONTINUED | OUTPATIENT
Start: 2017-05-01 | End: 2017-05-01

## 2017-05-01 RX ADMIN — POTASSIUM CHLORIDE 20 MEQ: 29.8 INJECTION, SOLUTION INTRAVENOUS at 14:06

## 2017-05-01 RX ADMIN — ATORVASTATIN CALCIUM 40 MG: 40 TABLET, FILM COATED ORAL at 21:58

## 2017-05-01 RX ADMIN — ROCURONIUM BROMIDE 20 MG: 10 INJECTION INTRAVENOUS at 09:37

## 2017-05-01 RX ADMIN — IOPAMIDOL 97 ML: 755 INJECTION, SOLUTION INTRAVASCULAR at 10:45

## 2017-05-01 RX ADMIN — SODIUM CHLORIDE, POTASSIUM CHLORIDE, SODIUM LACTATE AND CALCIUM CHLORIDE: 600; 310; 30; 20 INJECTION, SOLUTION INTRAVENOUS at 08:10

## 2017-05-01 RX ADMIN — Medication 3000 UNITS: at 09:47

## 2017-05-01 RX ADMIN — HYDROMORPHONE HYDROCHLORIDE 0.5 MG: 1 INJECTION, SOLUTION INTRAMUSCULAR; INTRAVENOUS; SUBCUTANEOUS at 14:40

## 2017-05-01 RX ADMIN — SODIUM CHLORIDE: 9 INJECTION, SOLUTION INTRAVENOUS at 07:30

## 2017-05-01 RX ADMIN — PANTOPRAZOLE SODIUM 40 MG: 40 TABLET, DELAYED RELEASE ORAL at 21:56

## 2017-05-01 RX ADMIN — Medication 12.5 MG: at 21:56

## 2017-05-01 RX ADMIN — HYDRALAZINE HYDROCHLORIDE 25 MG: 25 TABLET ORAL at 18:45

## 2017-05-01 RX ADMIN — POTASSIUM CHLORIDE 20 MEQ: 29.8 INJECTION, SOLUTION INTRAVENOUS at 13:24

## 2017-05-01 RX ADMIN — HYDRALAZINE HYDROCHLORIDE 25 MG: 25 TABLET ORAL at 23:28

## 2017-05-01 RX ADMIN — Medication 5 MG: at 07:51

## 2017-05-01 RX ADMIN — CEFAZOLIN 2 G: 1 INJECTION, POWDER, FOR SOLUTION INTRAMUSCULAR; INTRAVENOUS at 08:16

## 2017-05-01 RX ADMIN — HYDRALAZINE HYDROCHLORIDE 25 MG: 25 TABLET ORAL at 04:32

## 2017-05-01 RX ADMIN — MEROPENEM 1 G: 1 INJECTION, POWDER, FOR SOLUTION INTRAVENOUS at 04:32

## 2017-05-01 RX ADMIN — Medication: at 14:14

## 2017-05-01 RX ADMIN — BUMETANIDE 2 MG: 0.25 INJECTION, SOLUTION INTRAMUSCULAR; INTRAVENOUS at 13:04

## 2017-05-01 RX ADMIN — CLOPIDOGREL 75 MG: 75 TABLET, FILM COATED ORAL at 07:15

## 2017-05-01 RX ADMIN — HEPARIN SODIUM 750 UNITS/HR: 10000 INJECTION, SOLUTION INTRAVENOUS at 22:59

## 2017-05-01 RX ADMIN — Medication 9000 UNITS: at 09:36

## 2017-05-01 RX ADMIN — FENTANYL CITRATE 100 MCG: 50 INJECTION, SOLUTION INTRAMUSCULAR; INTRAVENOUS at 07:50

## 2017-05-01 RX ADMIN — SODIUM THIOSULFATE 2 MCG/KG/MIN: 250 INJECTION, SOLUTION INTRAVENOUS at 02:27

## 2017-05-01 RX ADMIN — POTASSIUM CHLORIDE 20 MEQ: 1.5 POWDER, FOR SOLUTION ORAL at 21:57

## 2017-05-01 RX ADMIN — BUMETANIDE 0.5 MG/HR: 0.25 INJECTION INTRAMUSCULAR; INTRAVENOUS at 13:03

## 2017-05-01 RX ADMIN — HYDROMORPHONE HYDROCHLORIDE 0.5 MG: 1 INJECTION, SOLUTION INTRAMUSCULAR; INTRAVENOUS; SUBCUTANEOUS at 16:53

## 2017-05-01 RX ADMIN — PROPOFOL 50 MCG/KG/MIN: 10 INJECTION, EMULSION INTRAVENOUS at 10:50

## 2017-05-01 RX ADMIN — CEFAZOLIN 1 G: 1 INJECTION, POWDER, FOR SOLUTION INTRAMUSCULAR; INTRAVENOUS at 10:14

## 2017-05-01 RX ADMIN — FENTANYL CITRATE 25 MCG: 50 INJECTION, SOLUTION INTRAMUSCULAR; INTRAVENOUS at 17:59

## 2017-05-01 RX ADMIN — PROPOFOL 30 MG: 10 INJECTION, EMULSION INTRAVENOUS at 07:50

## 2017-05-01 RX ADMIN — SODIUM CHLORIDE: 9 INJECTION, SOLUTION INTRAVENOUS at 07:40

## 2017-05-01 RX ADMIN — BUMETANIDE 2 MG: 0.25 INJECTION, SOLUTION INTRAMUSCULAR; INTRAVENOUS at 01:56

## 2017-05-01 RX ADMIN — SODIUM THIOSULFATE 1 MCG/KG/MIN: 250 INJECTION, SOLUTION INTRAVENOUS at 12:59

## 2017-05-01 RX ADMIN — ASPIRIN 325 MG ORAL TABLET 325 MG: 325 PILL ORAL at 04:32

## 2017-05-01 RX ADMIN — MEROPENEM 1 G: 1 INJECTION, POWDER, FOR SOLUTION INTRAVENOUS at 12:33

## 2017-05-01 RX ADMIN — ROCURONIUM BROMIDE 40 MG: 10 INJECTION INTRAVENOUS at 07:50

## 2017-05-01 RX ADMIN — SODIUM CHLORIDE: 9 INJECTION, SOLUTION INTRAVENOUS at 10:35

## 2017-05-01 RX ADMIN — HYDRALAZINE HYDROCHLORIDE 20 MG: 20 INJECTION INTRAMUSCULAR; INTRAVENOUS at 16:07

## 2017-05-01 RX ADMIN — ROCURONIUM BROMIDE 10 MG: 10 INJECTION INTRAVENOUS at 08:47

## 2017-05-01 RX ADMIN — MEROPENEM 1 G: 1 INJECTION, POWDER, FOR SOLUTION INTRAVENOUS at 21:31

## 2017-05-01 RX ADMIN — Medication 5 MG: at 10:01

## 2017-05-01 RX ADMIN — HYDROMORPHONE HYDROCHLORIDE 0.5 MG: 1 INJECTION, SOLUTION INTRAMUSCULAR; INTRAVENOUS; SUBCUTANEOUS at 02:16

## 2017-05-01 RX ADMIN — HYDROMORPHONE HYDROCHLORIDE 0.5 MG: 1 INJECTION, SOLUTION INTRAMUSCULAR; INTRAVENOUS; SUBCUTANEOUS at 21:57

## 2017-05-01 RX ADMIN — ROCURONIUM BROMIDE 10 MG: 10 INJECTION INTRAVENOUS at 10:14

## 2017-05-01 RX ADMIN — ROCURONIUM BROMIDE 10 MG: 10 INJECTION INTRAVENOUS at 09:26

## 2017-05-01 ASSESSMENT — LIFESTYLE VARIABLES: TOBACCO_USE: 1

## 2017-05-01 NOTE — ANESTHESIA POSTPROCEDURE EVALUATION
Patient: Luke Henao    Procedure(s):  Mitraclip Procedure Possible Cardiopulmonary Bypass  - Wound Class: I-Clean    Diagnosis:Mitral Regurge   Diagnosis Additional Information: No value filed.    Anesthesia Type:  General    Note:  Anesthesia Post Evaluation    Patient location during evaluation: ICU  Patient participation: Unable to evaluate secondary to administered sedation  Level of consciousness: obtunded/minimal responses  Pain management: adequate  Airway patency: patent  Cardiovascular status: acceptable  Respiratory status: acceptable  Hydration status: acceptable  PONV: none     Anesthetic complications: None    Comments: Patient transported directly from the OR to the ICU sedated and intubated.  VSS throughout transport and on arrival.  Report given to ICU.        Last vitals:  Vitals:    05/01/17 0630 05/01/17 0645 05/01/17 0700   BP:      Resp:   18   Temp:      SpO2: 99% 98%          Electronically Signed By: Airam Dominguez MD  May 1, 2017  11:52 AM

## 2017-05-01 NOTE — PLAN OF CARE
Problem: Goal Outcome Summary  Goal: Goal Outcome Summary  1) pt will be hemodynamically stable  2) pt will tolerate weaning of IABP  3) pain will be adequately controlled     Outcome: Declining  Alert but slightly confused over NOC.  services for consent signatures this AM. Left for Cath lab 0715. ASA and Plavix given. Afebrile over NOC. Reports dizziness and left sided weakness has not improved. IABP 1:2 over NOC. Heparin infusing as ordered. MAPs 80-90s. PRN hydralazine given. Nipride infusing at 2mcg all NOC. ST with rates 110s. No ectopy noted. CVP 14,15,15. SV02 50s. 4L via NC. Coarse crackles to R lung. 2mg Bumex given. UOP 50-100cc/hr. Pain to coccyx/pressure area. Topical morphine given PRN.

## 2017-05-01 NOTE — PLAN OF CARE
Problem: Goal Outcome Summary  Goal: Goal Outcome Summary  1) pt will be hemodynamically stable  2) pt will tolerate weaning of IABP  3) pain will be adequately controlled     OT 4E: Cancel - pt off the unit for procedure. Will reschedule per POC.

## 2017-05-01 NOTE — PROCEDURES
BRIEF CARDIAC PROCEDURE NOTE    Coronary angiogram with patent LAD and RCA.    Successful reduction of MR to mild with a single MitraClip in the A2/P2 position.    Left common femoral artery and vein sheaths to be removed per protocol.  IABP 1:1.  Heparin can be restarted 4 hours after sheaths are removed if no bleeding issues.    Right common femoral vein MitraClip access closed with suture closure devices.    No complications.    See Cath note for details.      Andrew Norwood  Interventional Cardiology Fellow  [phone]

## 2017-05-01 NOTE — PROGRESS NOTES
SPIRITUAL HEALTH SERVICES  [facility] ([place]) 3C   PRE-SURGERY VISIT    Reviewed documentation. Attempted pre-surgery visit with Henao, family declined.   Marie Aguilar MDiv  Chaplain Resident  Pager Number [phone]

## 2017-05-01 NOTE — ANESTHESIA CARE TRANSFER NOTE
Patient: Luke Henao    Procedure(s):  Mitraclip Procedure Possible Cardiopulmonary Bypass  - Wound Class: I-Clean    Diagnosis: Mitral Regurge   Diagnosis Additional Information: No value filed.    Anesthesia Type:   General     Note:  Airway :ETT and Oral Airway  Patient transferred to:ICU  Comments: Transferred to: ICU 4E    Patient vital signs: stable    Airway: ETT    Monitored, sedated, O2 via ambu.  IABP.  Report to RN.       Vitals: (Last set prior to Anesthesia Care Transfer)    CRNA VITALS  5/1/2017 1021 - 5/1/2017 1104      5/1/2017             Resp Rate (observed): 12    Resp Rate (set): 12                Electronically Signed By: RUBI Hernadez CRNA  May 1, 2017  11:04 AM

## 2017-05-01 NOTE — PROGRESS NOTES
[facility]  WO Nurse Inpatient Adult Pressure INJURY (PI) Wound Assessment      Follow up assessment of PU(s) on pt's:   Coccyx  Data:   Patient History:    per MD note(s): 51 year old man presented with cardiogenic shock from inferior STEMI s/p RCA aspiration thrombectomy and POBA on 3/26 at [facility] s/p IABP and initiation of Dopamine, also during procedure, CHB s/p temp pacer, emesis/hematmesis and altered mental status s/p intubation transferred here for consideration of mechanical support on 3/27. Had PCI to RCA, LAD and LCx (on ASA and plavix) started on epinephrine in addition to dopamine, post procedure developed distributive shock picture from infection (aspiration pneumonia? Sputum cx growing staph aureus, on vanco and zosyn) vs post-MI SIRS response. Currently in cardiogenic shock with IABP in situ       Current mattress:  Isolibrium  Current pressure relieving devices: Heel lift boots and Pillows    Moisture Management:  Incontinence Protocol, Rectal Tube and Urinary Catheter    Catheter secured? Yes    Current Diet / Nutrition:        Active Diet Order None      John Assessment and sub scores:  John Score Av.1 Min: 12 Max: 18           Recent Labs   Lab Test 17  0400   17  0345   17  0425   ALBUMIN 1.8* < > 2.1* < > 2.4*   HGB 7.9* < > 7.9* < > 9.5*   INR --  --  1.25* < > 1.19*   WBC 14.9* < > 9.7 < > 16.7*   A1C --  --  --  --  7.1*   CRP --  --  --  --  18.0*   < > = values in this interval not displayed.      Pressure Injury Assessment (location #1): Coccyx   Wound History: Stage 2 pressure injury situated more toward right buttock    Photo, coccyx 17       Wound Base: white dermis with red follicles no surrounding erythema , moist    Specific Dimensions (length x width x depth, in cm) : 1.5cm x 1.5cm x < 0.1 cm    Palpation of the wound bed: normal    Slough appearance: none       Periwound Skin: intact,     Color: normal and consistent with surrounding  tissue    Temperature normal     Drainage: Amount: scant, Color: serous     Odor: none    Pain: patient intubated     Intervention:     Patient's chart evaluated.     John Interventions: Current John Interventions and Care Plan reviewed and updated, appropriate at this time.    Wound was assessed.    Wound Care: was done: Visual inspection, Removed mepilex, Cleansing with NS solution, Application of  Paste    Orders Reviewed    Supplies Ordered Traid Paste    Discussed plan of care with Nurse      Assessment:     Pressure Injury (PI) located on Coccyx: II    Status: wound no significant change, Symptomatic          Plan:     Nursing to notify the Provider(s) and re-consult the WOC Nurse if wound(s) deteriorate(s).    Plan of care for wound located on Coccyx: Coloplast Triad Barrier, apply 3 times daily, gently pat area clean    Discontinue dressing because it does not stay     WOC Nurse will return: Mondays and Thursday  Face to face time: 15 minutes

## 2017-05-01 NOTE — PROCEDURES
MITRACLIP PROCEDURE REPORT:     PROCEDURES PERFORMED:   1. Successful transcatheter mitral valve repair using the MitraClip device.  2. Veinotomy closure.    PHYSICIANS:  1. Attending Interventional Cardiology Staff: Estrada Johnson MD and Ron Cortez MD  2. Structural Interventional Cardiology Fellow: Andrew Norwood MD     INDICATION:  Luke Henao is a 51 year old male with severe mitral regurgitation and associated cardiogenic shock who is a poor surgical candidate who presents on an urgent inpatient basis for transcatheter mitral valve repair with the MitraClip device.    DESCRIPTION:  1. Consent obtained from family with discussion of risks.  All questions were answered.  2. Sterile prep and procedure per OR protocol.  3. Location: right common femoral vein for MitraClip, left common femoral artery for coronary angiography and left common femoral vein for cental access.  4. Access: Local anesthetic with lidocaine.  A micropuncture (21 g) needle with ultrasound guidance was used to establish vascular access using a modified Seldinger technique.  5. Sheath:  24Fr MitraClip delivery system sheath in the RCFV, 4Fr long sheath in the LCFA, 6Fr long sheath in LCFV  6. Catheters: SL-1, 4Fr JL-4, 4Fr 3DRC.  7. Estimated blood loss of 15 mL.  8. See below for procedure details.    MEDICATIONS:  1. Sedation per anesthesia.  2. Heparin administered to achieve a goal ACT > 300 sec per anesthesia.   3. Protamine IV.    CORONARY ANGIOGRAM:   1. Both coronary arteries arise from their respective cusps.  2. Right dominant.  3. LM has mild luminal irregularities.   4. LAD supplies the apex along with the RCA (type 2 LAD) and gives rise to septal perforators and a large and branching D1. There are widely patent stents extending from the proximal to mid LAD. The dLAD has mild to moderate disease to 30% stenosis. The D1 is jailed by the LAD stents, but has EBER 3 flow with disease to 30% stenosis.    5. The small ramus is no longer  present.  6. LCX is occluded at the ostium.   7. RCA gives rise to PL branches and supplies PDA. There are widely patent stents extending from the proximal to mid RCA. The remainder of the mRCA has disease to 50% stenosis and the dRCA has disease to 10% stenosis. The RPDA has a widely patent stent and the remainder of the artery has mild disease to 10% stenosis. The RPLA has small vessels with diffuse disease including a distal branch occlusion. The RPLA supplies some small collateral branches to the dLCx.    MITRACLIP:  1. Access was obtained in the right common femoral vein.  The venous site used for MitraClip delivery was pre-closed with two Perclose Proglide percutaneous suture devices.   2. Access to the left atrium was achieved using a standard transseptal puncture technique with fluoroscopic and transesophageal echocardiographic guidance using a [facility]  RF transseptal guiding sheath and needle.  A pigtail wire was advanced into the left atrium and secured in place. Following transseptal puncture intravenous heparin was administered to achieve and maintain an activated clotting time (ACT) of >300.   3. Pre-dilation of the femoral access site was performed with ascending size dilators to allow for insertion of a 24 Bangladeshi steerable transvenous sheath.  The delivery sheath was advanced across the interatrium septum without difficulty.  The left atrial pressure was measured with mean 25 mmHg and v-waves to 40 mmHg. A pigtail catheter was positioned in the left atrium for continuous LA pressure monitoring.  4. The MitraClip Clip Delivery System was advanced into the left atrium.  Under multiplane SAMEERA guidance, the MitraClip was oriented appropriately over the mitral valve. The clip was then opened and the arms were positioned perpendicularly to the leaflets using the en face 3D SAMEERA projection. Once properly oriented, the clip was advanced to the left ventricle, and the clip delivery system was then  pulled back and the leaflets were grasped by dropping the grippers. After confirmation of adequate grasping of the leaflets, the arms were closed and reduction of mitral regurgitation was assessed and the possibility of iatrogenic mitral stenosis was evaluated. Once an adequate reduction in mitral regurgitation was confirmed with evaluation of gradients and direct echocardiographic visualization, the clip was successfully deployed.  The clip was placed in the A2/P2 position.  Final left atrial pressure was measured with v-wave to 20 mmHg.    5. The delivery system and sheath were removed and optimal hemostasis was achieved with deployment of the Perclose sutures.   6. Mitral regurgitation was confirmed to be mild following the procedure and the mean gradient was 1.5 mmHg.     NOTABLE EVENTS:  1. None    COMPLICATIONS:  1. None    SUMMARY:    1. Severe mitral regurgitation secondary with associated refractory cardiogenic shock in a poor surgical candidate.  2. Successful transcatheter repair of the mitral valve with the MitraClip device using a total of 1 clip.  3. Mitral regurgitation severity improved to mild.  4. Coronary anatomy unchanged from 4/1/2017.  5. Right common femoral veinotomy closed with a suture closure device.  6. Left common femoral artery and vein sheaths to be removed when ACT < 180 seconds.    PLAN:    1. Aspirin 81 mg po daily lifelong.  2. Continue Plavix 75 mg po daily.  3. Bedrest per protocol.  4. Heparin can be restarted 4 hours after sheaths are removed if no bleeding issues.  IABP 1:1 until heparin restarted.  5. Return to the primary inpatient team for further evaluation and management.  6. Echocardiogram tomorrow.       The attending interventional cardiologists were present and participated in the entire procedure.      Andrew Norwood  Structural Heart Disease &   Advanced Interventional Cardiology Fellow  [phone]

## 2017-05-01 NOTE — ANESTHESIA PREPROCEDURE EVALUATION
Anesthesia Evaluation     . Pt has had prior anesthetic.     No history of anesthetic complications          ROS/MED HX    ENT/Pulmonary:     (+)tobacco use, Past use , . .    Neurologic: Comment: Concern for recent CVA      Cardiovascular: Comment: Cardiogenic shock s/p STEMI with recent PCI (LAD, Cx, RCA). 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 : . . . :. . Previous cardiac testing Echodate:4/25/17results:EF 30-35%, severe MR, normal RV, RSVP 32date: results: date: results: date: results:          METS/Exercise Tolerance:     Hematologic:         Musculoskeletal:         GI/Hepatic: Comment: GIB- cristina raman tear        Renal/Genitourinary:         Endo:         Psychiatric:         Infectious Disease:         Malignancy:         Other:                     Physical Exam  Normal systems: dental    Airway   TM distance: >3 FB  Neck ROM: full    Dental     Cardiovascular   Rhythm and rate: regular and normal      Pulmonary                     Anesthesia Plan      History & Physical Review  History and physical reviewed and following examination; no interval change.    ASA Status:  3 .        Plan for General and ETT with Intravenous induction. Maintenance will be Balanced.    PONV prophylaxis:  Ondansetron (or other 5HT-3)  Additional equipment: 2nd IV, Arterial Line and Central Line Arterial line, central line, IABP in place      Postoperative Care  Postoperative pain management:  IV analgesics.      Consents  Anesthetic plan, risks, benefits and alternatives discussed with:  Spouse..        ANESTHESIA PREOP EVALUATION    HPI: Luke Henao is a 51 year old male who presents for Procedure(s):  Mitraclip Procedure Possible Cardiopulmonary Bypass  - Wound Class: I-Clean    PMHx/PSHx/ROS:  No past medical history on file.    Past Surgical History:   Procedure Laterality Date     COLONOSCOPY N/A 4/17/2017    Procedure: COLONOSCOPY;  Surgeon: Rashaad Bundy MD;  Location: [facility] GI     INSERT  INTRAAORTIC BALLOON PUMP Right 4/19/2017    Procedure: INSERT INTRAAORTIC BALLOON PUMP;  Right Subclavian Intra Aortic Balloon Pump Insertion using Maquet 40cc Ballon Catheter, Implentation of 8mm Gelweave Woven Vascular Prosthesis, Removal of Left Femoral Ballon Pump Catheter, Flouroscopy;  Surgeon: Keshav Leung MD;  Location: [facility] OR       Past Anes Hx: No personal or family h/o anesthesia problems    Soc Hx:   Social History   Substance Use Topics     Smoking status: Current Every Day Smoker     Packs/day: 0.50     Types: Cigarettes     Smokeless tobacco: Not on file     Alcohol use Not on file       Allergies: No Known Allergies    Meds:   Current Facility-Administered Medications   Medication     May take oral meds with a sip of water, the morning of SAMEERA procedure.     HOLD: Insulin - REGULAR AM of procedure IF diabetic     HOLD: Insulin - RAPID/SHORT acting AM of procedure IF diabetic     HOLD: Oral hypoglycemics AM of procedure IF diabetic     Give   of usual dose of LONG ACTING insulin AM of procedure IF diabetic     0.9% sodium chloride infusion     [START ON 5/2/2017] aspirin EC EC tablet 325 mg     0.9% sodium chloride infusion     ceFAZolin sodium-dextrose (ANCEF) infusion 2 g     norepinephrine (LEVOPHED) 16 mg in D5W 250 mL infusion     EPINEPHrine (ADRENALIN) 5 mg in NaCl 0.9 % 250 mL infusion     nitroprusside (NIPRIDE) 50 mg, sodium thiosulfate 500 mg in D5W 125 mL infusion (conc: 0.4mg/mL)     DOPamine 400 mg in dextrose 5% 250 mL (adult std) - premix     clopidogrel (PLAVIX) tablet 75 mg     meropenem (MERREM) 1 g vial to attach to  mL bag     dextrose 10 % 1,000 mL infusion     insulin 1 unit/mL in saline (NovoLIN, HumuLIN Regular) drip - ADULT IV Infusion     morphine 0.1% in solosite topical gel     glucose 40 % gel 15-30 g    Or     dextrose 50 % injection 25-50 mL    Or     glucagon injection 1 mg     heparin  drip 25,000 units in 0.45% NaCl 250 mL (see additional  administration details for dose)     heparin bolus from infusion pump     lidocaine (LMX4) kit     heparin lock flush 10 UNIT/ML injection 2-5 mL     sodium chloride (PF) 0.9% PF flush 10-20 mL     heparin lock flush 10 UNIT/ML injection 5-10 mL     heparin lock flush 10 UNIT/ML injection 5-10 mL     QUEtiapine (SEROquel) half-tab 12.5 mg     acetaminophen (TYLENOL) tablet 650 mg     pantoprazole (PROTONIX) suspension 40 mg     atorvastatin (LIPITOR) tablet 40 mg     alteplase (CATHFLO ACTIVASE) injection 2 mg     polyethylene glycol (MIRALAX/GLYCOLAX) Packet 17 g     senna-docusate (SENOKOT-S;PERICOLACE) 8.6-50 MG per tablet 1-2 tablet     potassium chloride SA (K-DUR/KLOR-CON M) CR tablet 20-40 mEq     potassium chloride (KLOR-CON) Packet 20-40 mEq     potassium chloride 10 mEq in 100 mL intermittent infusion     potassium chloride 10 mEq in 100 mL intermittent infusion with 10 mg lidocaine     potassium chloride 20 mEq in 50 mL intermittent infusion     pneumococcal vaccine (PNEUMOVAX 23-irma) injection 0.5 mL     HYDROmorphone (PF) (DILAUDID) injection 0.3-0.5 mg     oxyCODONE (ROXICODONE) IR tablet 5-10 mg     bisacodyl (DULCOLAX) Suppository 10 mg     ondansetron (ZOFRAN) injection 4 mg     lidocaine 1 % 1 mL     lidocaine (LMX4) kit     sodium chloride (PF) 0.9% PF flush 3 mL     sodium chloride (PF) 0.9% PF flush 3 mL     lidocaine 1 % 0.5-5 mL     sodium chloride (PF) 0.9% PF flush 5-50 mL     heparin lock flush 10 UNIT/ML injection 2-5 mL     dextrose 10 % 1,000 mL infusion     multivitamins with minerals (CERTAVITE/CEROVITE) liquid 15 mL     magnesium sulfate 4 g in 100 mL sterile water (premade)     magnesium sulfate 2 g in NS intermittent infusion (PharMEDium or FV Cmpd)     potassium phosphate 10 mmol in D5W 250 mL intermittent infusion     potassium phosphate 15 mmol in D5W 250 mL intermittent infusion     potassium phosphate 20 mmol in D5W 500 mL intermittent infusion     potassium phosphate 20 mmol  in D5W 250 mL intermittent infusion     potassium phosphate 25 mmol in D5W 500 mL intermittent infusion     naloxone (NARCAN) injection 0.1-0.4 mg     Patient is already receiving anticoagulation with heparin, enoxaparin (LOVENOX), warfarin (COUMADIN)  or other anticoagulant medication     dextrose 10 % 1,000 mL infusion       NPO Status: >8 hours     Labs:    BMP:  Recent Labs   Lab Test  05/01/17 0342   NA  143   POTASSIUM  3.8   CHLORIDE  107   CO2  26   BUN  41*   CR  1.40*   GLC  105*   ROB  7.7*     CBC:   Recent Labs   Lab Test  05/01/17 0342   WBC  10.6   RBC  2.53*   HGB  7.4*   HCT  25.7*   MCV  102*   MCH  29.2   MCHC  28.8*   RDW  19.9*   PLT  436     Coags:  Recent Labs   Lab Test  05/01/17 0342 04/20/17   0345   03/28/17   1233   INR   --   1.25*   < >   --    PTT  53*   --    < >   --    FIBR   --    --    --   375    < > = values in this interval not displayed.       Airam Dominguez MD  Staff Anesthesiologist  Pager [phone]  5/1/2017  7:32 AM

## 2017-05-01 NOTE — PROCEDURES
Small Bowel Feeding Tube Placement Assessment  Reason for Feeding Tube Placement: replacement of FT that was pulled out on 4/30  Cortrak Start Time: 11:34 am   Cortrak End Time:  12:04 pm  Medicine Delivered During Procedure: lubricating jelly  Placement Successful: Unsuccessful.     Procedure Complications: Large amount of resistance in esophagus, unable to pass FT very far into stomach without resistance. FT released to radiology.   Final Placement Glenn at exit of nare: NA - FT was removed  Face to Face time with patient: 45 mins    Paula Guerrero RD, LD  CVICU Dietitian  Pager: [phone]

## 2017-05-01 NOTE — PLAN OF CARE
Problem: Goal Outcome Summary  Goal: Goal Outcome Summary  1) pt will be hemodynamically stable  2) pt will tolerate weaning of IABP  3) pain will be adequately controlled     ST 4E: Cx- Pt remains intubated post procedure. Given change in status, ST to sign off at this time. Please re-consult as appropriate post extubation.     Speech Language Therapy Discharge Summary     Reason for therapy discharge:    Change in medical status.     Progress towards therapy goal(s). See goals on Care Plan in [facility] electronic health record for goal details.  Goals not met.  Barriers to achieving goals:   limited tolerance for therapy.     Therapy recommendation(s):    Please re-consult as appropriate post extubation

## 2017-05-01 NOTE — PROGRESS NOTES
"[facility]  Cardiology II Service / Advanced Heart Failure  4/30/2017      Interval History:   - No acute events overnight  - s/p MitraClip this AM            Intake/Output Summary (Last 24 hours) at 05/01/17 1908  Last data filed at 05/01/17 1900   Gross per 24 hour   Intake          2491.37 ml   Output             3555 ml   Net         -1063.63 ml             Pertinent Medications:  I have reviewed this patient's current medications      sodium chloride (PF)  3 mL Intracatheter Q8H     [START ON 5/2/2017] aspirin  81 mg Oral Daily     sodium chloride (PF)  3 mL Intracatheter Q8H     hydrALAZINE  25 mg Oral Q6H     clopidogrel  75 mg Oral Daily     meropenem  1 g Intravenous Q8H     heparin lock flush  5-10 mL Intracatheter Q24H     QUEtiapine  12.5 mg Oral At Bedtime     pantoprazole  40 mg Per Feeding Tube BID     atorvastatin  40 mg Oral or Feeding Tube Daily at 8 pm     pneumococcal vaccine  0.5 mL Intramuscular Once     sodium chloride (PF)  3 mL Intracatheter Q8H     multivitamins with minerals  15 mL Per Feeding Tube Daily         Examination:  /66  Temp 97.6  F (36.4  C) (Axillary)  Resp 18  Ht 1.575 m (5' 2.01\")  Wt 59.1 kg (130 lb 4.7 oz)  SpO2 98%  BMI 23.82 kg/m2  GEN: Intubated and sedated  NECK: can not assess JVP   RESP: +crackles   CV: S1S2S3, no murmurs that i could auscultate  ABD: Soft, nontender to palpation, no HSM, +BS, no guarding  EXT: No peripheral edema, warm/well perfused   SKIN: Normal skin turgor, no rash     Data:  CMP  Recent Labs  Lab 05/01/17  1542 05/01/17  1158 05/01/17  1029 05/01/17  0835 05/01/17  0342 04/30/17  1613  04/30/17  0401  04/29/17  0430  04/28/17  0438 04/27/17 2026  04/27/17  0433 04/26/17 2039     --  141 142 143 140  --  142  < > 143  < > 142  --   < > 139  --    POTASSIUM 4.4 3.4 4.0 3.9 3.8 4.0  < > 3.4  < > 4.4  < > 4.8 3.9  < > 4.3 4.5   CHLORIDE 107  --   --   --  107 106  --  104  < > 104  < > 104  " --   < > 102  --    CO2 27  --   --   --  26 27  --  27  < > 26  < > 29  --   < > 31  --    ANIONGAP 9  --   --   --  9 8  --  11  < > 13  < > 9  --   < > 6  --    *  --  122* 111* 105* 144*  --  104*  < > 101*  < > 150*  --   < > 153*  --    BUN 41*  --   --   --  41* 40*  --  40*  < > 37*  < > 33*  --   < > 30  --    CR 1.52*  --   --   --  1.40* 1.22  --  1.35*  < > 1.32*  < > 1.29*  --   < > 1.28*  --    GFRESTIMATED 49*  --   --   --  54* 63  --  56*  < > 57*  < > 59*  --   < > 60*  --    GFRESTBLACK 59*  --   --   --  65 76  --  68  < > 70  < > 71  --   < > 72  --    ROB 7.6*  --   --   --  7.7* 8.0*  --  7.9*  < > 8.1*  < > 7.6*  --   < > 7.8*  --    MAG  --  2.4*  --   --  2.8* 2.9*  --  2.9*  < > 2.6*  < > 2.6* 2.6*  < > 2.6* 2.4*   PHOS  --   --   --   --   --   --   --   --   --   --   --  3.9 2.4*  --  2.9 2.8   PROTTOTAL  --   --   --   --  6.6*  --   --  6.8  --  6.8  --  6.6*  --   --  6.6*  --    ALBUMIN  --   --   --   --  2.1*  --   --  2.1*  --  2.0*  --  1.8*  --   --  1.8*  --    BILITOTAL  --   --   --   --  0.6  --   --  0.5  --  0.6  --  0.5  --   --  0.4  --    ALKPHOS  --   --   --   --  71  --   --  77  --  89  --  86  --   --  75  --    AST  --   --   --   --  31  --   --  34  --  36  --  29  --   --  26  --    ALT  --   --   --   --  21  --   --  21  --  25  --  24  --   --  24  --    < > = values in this interval not displayed.  CBC    Recent Labs  Lab 05/01/17  1158 05/01/17  1029 05/01/17  0835 05/01/17  0342 04/30/17  0401 04/29/17  0430 04/28/17 0438   WBC  --   --   --  10.6 12.6* 14.9* 14.1*   RBC  --   --   --  2.53* 2.56* 2.64* 2.58*   HGB 7.7* 7.2* 7.6* 7.4* 7.6* 8.0* 7.6*   HCT  --   --   --  25.7* 25.4* 26.4* 26.3*   MCV  --   --   --  102* 99 100 102*   MCH  --   --   --  29.2 29.7 30.3 29.5   MCHC  --   --   --  28.8* 29.9* 30.3* 28.9*   RDW  --   --   --  19.9* 19.7* 19.4* 19.0*   PLT  --   --   --  436 444 409 405     INR  No lab results found in last 7  days.  Arterial Blood Gas    Recent Labs  Lab 05/01/17  1842 05/01/17  1542 05/01/17  1404 05/01/17  1218 05/01/17  1157   PH 7.45 Incorrect specimen typeCORRECTED ON 05/01 AT 1726: PREVIOUSLY REPORTED AS 7.39 7.45  --  7.42   PCO2 38 Incorrect specimen typeCORRECTED ON 05/01 AT 1726: PREVIOUSLY REPORTED AS 46 37  --  40   PO2 89 Incorrect specimen typeCORRECTED ON 05/01 AT 1726: PREVIOUSLY REPORTED AS 40 79*  --  171*   HCO3 27 Incorrect specimen typeCORRECTED ON 05/01 AT 1726: PREVIOUSLY REPORTED AS 28 26  --  26   O2PER 3L Incorrect specimen typeCORRECTED ON 05/01 AT 1726: PREVIOUSLY REPORTED AS 3L  3L 30 50.0 50       Imaging Studies:    CTA heaf and neck  Impression:   1. Head CTA demonstrates at least moderate stenosis of both cavernous  internal carotid arteries. Diffuse mild to moderate narrowing of the  basilar artery. Additional areas of mild bilateral M2 segment  narrowing, presumably also related to atherosclerosis.  2. No aneurysm of the major intracranial arteries.   3. Neck CTA demonstrates no carotid artery stenosis. Short segment  mild narrowing of the proximal right V2 segment.  4. No change in probable bilateral cerebral white matter infarcts. No  acute intracranial hemorrhage    Echo 3/27  The visual ejection fraction is estimated at 30-35%.  There is extensive inferior, inferoseptal, and inferolateral wall akinesis.  Basal/mid lateral wall hypokinesis  There is moderate to severe septal hypokinesis.  Septal wall motion abnormality may reflect pacemaker activation.  There is a catheter/pacemaker lead seen in the right ventricle.  The right ventricle is mildly dilated.  Severely decreased right ventricular systolic function  There is no pericardial effusion.  The rhythm was paced.  Compared to the prior study, the LVEF appears somewhat decreased. Septal wall  motion abnormality larger- may reflect, in part, that patient in sinus tach on  prior study, and ventricular paced now. No hemodynamically  significant  valvular abnormalities on 2D or color flow imaging     CT chest/abdomen 3/27   IMPRESSION:   1. Small mediastinal hemorrhage, small bilateral pleural effusions  which may be hemorrhagic. Small intraperitoneal hemorrhage. Minimal  pericardial hemorrhage.  2. Minimal pneumoperitoneum in the left paracolic gutter, of unknown  significance. No pneumatosis as clinically questioned.  3. IABP slightly low in position.  4. Bilateral pulmonary dependent consolidations represent compression  atelectasis, however differentials include aspiration.     Cath 3/26 [facility]  SUMMARY:   --Inferior STEMI w/ late presentation. Culprit dictated by complete  heart block, and cardiogenic shock. Emergent echo in the Cath Lab also  shows significant RV dysfunction.  --Three vessel coronary artery disease with diffuse coronary disease  out to the distal vessels  --Successful POBA of the prox and mid-RCA. Stent was not placed, in  anticipation he may need to be considered for bypass surgery in the  near future  --Insertion of a temporary pacemaker for bradycardia (AVB) and  hypotension  --Insertion of a IABP  --Upper GI bleed consistent with [place]lin-[place]     Cath [place] 3/27     CT head 3/28: normal      Echo 3/29  Interpretation Summary  Limited study. Ischemic CM. Moderately (EF 30-35%) reduced left ventricular  function is present. Traced at 32%.  Posterior wall akinesis is present. Lateral wall akinesis is present. Inferior  wall akinesis is present.  Right ventricular function, chamber size, wall motion, and thickness are  normal.  Dilation of the inferior vena cava is present with abnormal respiratory  variation in diameter.     Compared to prior study, RV fxn is improved.     Echo 3/31  Left Ventricle  The Ejection Fraction is estimated at 50-55%. Inferior,posterior,lateral,basal  septal wall akinesis as reported before. No change.     CORONARY ANGIOGRAM 4/1:   1. Both coronary arteries arise from their  respective cusps.  2. Right dominant.  3. LM has mild luminal irregularities.   4. LAD supplies the apex along with the RCA (type 2 LAD) and gives rise to septal perforators and a large and branching D1. There are widely patent stents extending from the proximal to mid LAD. The dLAD has mild to moderate disease to 30% stenosis. The D1 is jailed by the LAD stents, but has EBER 3 flow with disease to 30% stenosis.   5. The small ramus is no longer present.  6. LCX is occluded at the ostium.   7. RCA gives rise to PL branches and supplies PDA. There are widely patent stents extending from the proximal to mid RCA. The remainder of the mRCA has disease to 50% stenosis and the dRCA has disease to 10% stenosis. The RPDA has a widely patent stent and the remainder of the artery has mild disease to 10% stenosis. The RPLA has small vessels with diffuse disease including a distal branch occlusion. The RPLA supplies some small collateral branches to the dLCx.      COMPLICATIONS:  1. None      SUMMARY:   1. Cardiogenic shock following an inferior STEMI.  2. The LCx re-occlusion is not surprising as the recently revascularized LCx  had poor outflow and the revascularized portion did not supply a large territory (limited to no flow was present distal to the stented segment immediately following stenting). Repeat revascularization of the LCx would result in the same outcome of repeat closure and also risk embolizing thrombus from the LCx into the LAD so no treatment of the occluded LCx was performed.   3. Three vessel coronary artery disease with patent LAD and RCA stents and an occluded LCx.     Echo 4/3  Left ventricular size is normal.  The Ejection Fraction is estimated at 35-40%.  Inferior wall akinesis is present.  Lateral wall akinesis is present.  Posterior wall akinesis is present.  Right ventricular function, chamber size, wall motion, and thickness are  normal.  Moderate mitral insufficiency is present.  Moderate  tricuspid insufficiency is present.  Right ventricular systolic pressure is 47mmHg above the right atrial pressure.  The inferior vena cava is normal.     Echo 4/5  Moderately (EF 35-40%) reduced left ventricular function is present. Traced at  40%.  Moderate mitral insufficiency is present.  The cause of the mitral insufficiency appears to be restricted posterior  leaflet from posterior MI. No mitral leaflet pathology seen.  Dilation of the inferior vena cava is present with abnormal respiratory  variation in diameter.     CT abd without contrast 4/4  IMPRESSION:   1. No evidence of ischemic bowel, obstruction, or intra-abdominal  infection.  2. Bibasilar patchy pulmonary opacities likely represent combination  of aspiration and atelectasis.  3. Small amount of simple free fluid within the lower abdomen.  4. Small left retroperitoneal hematoma along the iliac vasculature  likely postprocedural.   5. Unchanged size of bilateral pleural effusions, which now consist of  simple fluid.    Assessment and Plan  51 year old man presented with cardiogenic shock from inferior STEMI s/p RCA aspiration thrombectomy and POBA on 3/26 at [facility] s/p IABP and initiation of Dopamine, also during procedure, CHB s/p temp pacer, emesis/hematmesis and altered mental status s/p intubation transferred here for consideration of mechanical support on 3/27. Had PCI to RCA, LAD and LCx (on ASA and plavix) started on epinephrine in addition to dopamine, post procedure developed distributive shock picture from infection (aspiration pneumonia? Sputum cx growing staph aureus, on vanco and zosyn) vs post-MI SIRS response. Currently in cardiogenic shock with IABP in situ    Card  # Cardiogenic shock 4/3- from underlying 3v CAD-has had high SVR and low CI, complicated by ischemic MR  # Due to inferior wall STEMI. S/P 7 stents to LAD, circ, RCA (angiogram report pending). Now with IABP, temporary pacemaker after 3rd deg heart block in cath lab at  [place] on 3/26   # Small pericardial hemorrhage   # Remains unclear why we are having a hard time weaning him off of IABP in the setting of EF of 3-35%. Microvascular disease is a possibility.  - IABP (subclavian) in place  - Cortisol normal  - Heparin low dose without bolus to start 4 hours after sheath pull  - Wean off of Nipride  - Uptitrate Hydralazine as tolerated      # Neuro  - CVA presenting with left hand weakness and pain  - CTA head/neck shows no hemorrhage but new areas of low attenuation in b/l hemispheres  - Neuro following.  - MAP goal > 70        Respiratory  -Hypoxic today placed on face mask  - Intubated in the setting of worsening hypoxia on 4/25  - Possible causes: aspiration PNA vs PE vs volume overload   - Diuresing to CVP < 12, already on heparin inf for IABP, and being treated with Vanc and Pip/tazo  - Pulmonary consulted,appreciate recs           # ID  - UA 4/15 with cloudy urine, 50 WBCs, many bacteria, no nitrates. UCx grew E coli - treated with CFTX (sensitive)  - Fever on 4/20 and single spike 100.2 overnight with blood cultures in process (no growth thus far), PICC and SGC removed, started on Vanc and pip/tazo  - 4/25 new fever and concern for aspiration pneumonia, continuing Vanc/Meropenem. ID following         # Endocrine  T2DM: HA1C 7.5 on admission.  - Insulin gtt per nurising protocol with hypoglycemia precautions.   - Switch to SQ Lantus 4/22      # GI  - Had several maroon BMs since 4/11, Hb overall dropped by a 1.5 grams or so, heparin inf was held and GI consulted  - Colonoscopy 4/17 showed a rectal ulcer but no intervenable lesions  - Will continue to monitor Hb, back on heparin inf  - Protonix 40 PO BID         # Renal/  - Daily Cr  - Avoid nephrotoxins as able  - Plan to keep CVP < 12     -Hypernatremia  - Resolved. Stopped his D5W, only getting FWF's.        FEN: feeding tube, and dysphagia level II  Code: FULL  PPx: PPI 40mg BID, senna PRN  Dispo: pending  stability    LINES:  - SC IABP  4/19  - LUE PICC   4/20  - Chavis catheter  4/20  - Lt IJ                            4/29        Plan of care discussed with Dr. Jamison Loving MD  Cardiovascular Disease Fellow  Pager: [phone]

## 2017-05-01 NOTE — PROGRESS NOTES
Patient arriving to floor from OR at 1100 after Mitral Clip, off all pressors, Propofol @ 50, insulin 0.5 and TKO @ 30.  SR 80's, CVP 17, MAP's 90's; nipride gtt to maintain MAP 65-75 restarted. Afebrile.  LS clear/dim.  BS audible/active.  Pulses weak upper and lower extremities.  Left groin sheath, last , plan to check hourly until below 160.  Plan to extubate later this afternoon as able.  Wife and family updated at bedside, all questions answered.  Will continue to monitor and update team as needed.

## 2017-05-02 ENCOUNTER — OFFICE VISIT (OUTPATIENT)
Dept: INTERPRETER SERVICES | Facility: CLINIC | Age: 52
End: 2017-05-02

## 2017-05-02 ENCOUNTER — APPOINTMENT (OUTPATIENT)
Dept: OCCUPATIONAL THERAPY | Facility: CLINIC | Age: 52
DRG: 228 | End: 2017-05-02
Attending: INTERNAL MEDICINE
Payer: COMMERCIAL

## 2017-05-02 ENCOUNTER — APPOINTMENT (OUTPATIENT)
Dept: GENERAL RADIOLOGY | Facility: CLINIC | Age: 52
DRG: 228 | End: 2017-05-02
Attending: INTERNAL MEDICINE
Payer: COMMERCIAL

## 2017-05-02 ENCOUNTER — APPOINTMENT (OUTPATIENT)
Dept: CARDIOLOGY | Facility: CLINIC | Age: 52
DRG: 228 | End: 2017-05-02
Attending: INTERNAL MEDICINE
Payer: COMMERCIAL

## 2017-05-02 LAB
ALBUMIN SERPL-MCNC: 2.3 G/DL (ref 3.4–5)
ALP SERPL-CCNC: 76 U/L (ref 40–150)
ALT SERPL W P-5'-P-CCNC: 22 U/L (ref 0–70)
ANION GAP SERPL CALCULATED.3IONS-SCNC: 6 MMOL/L (ref 3–14)
ANION GAP SERPL CALCULATED.3IONS-SCNC: 7 MMOL/L (ref 3–14)
ANION GAP SERPL CALCULATED.3IONS-SCNC: 8 MMOL/L (ref 3–14)
AST SERPL W P-5'-P-CCNC: 36 U/L (ref 0–45)
BACTERIA SPEC CULT: NO GROWTH
BACTERIA SPEC CULT: NO GROWTH
BASE EXCESS BLDV CALC-SCNC: 4 MMOL/L
BASE EXCESS BLDV CALC-SCNC: 5.8 MMOL/L
BASE EXCESS BLDV CALC-SCNC: 6.2 MMOL/L
BASE EXCESS BLDV CALC-SCNC: 6.5 MMOL/L
BASE EXCESS BLDV CALC-SCNC: 6.6 MMOL/L
BASOPHILS # BLD AUTO: 0.1 10E9/L (ref 0–0.2)
BASOPHILS NFR BLD AUTO: 0.6 %
BILIRUB DIRECT SERPL-MCNC: 0.2 MG/DL (ref 0–0.2)
BILIRUB SERPL-MCNC: 0.6 MG/DL (ref 0.2–1.3)
BUN SERPL-MCNC: 35 MG/DL (ref 7–30)
BUN SERPL-MCNC: 37 MG/DL (ref 7–30)
BUN SERPL-MCNC: 38 MG/DL (ref 7–30)
CA-I BLD-MCNC: 4.5 MG/DL (ref 4.4–5.2)
CALCIUM SERPL-MCNC: 8 MG/DL (ref 8.5–10.1)
CALCIUM SERPL-MCNC: 8.2 MG/DL (ref 8.5–10.1)
CALCIUM SERPL-MCNC: 8.4 MG/DL (ref 8.5–10.1)
CHLORIDE SERPL-SCNC: 107 MMOL/L (ref 94–109)
CHLORIDE SERPL-SCNC: 107 MMOL/L (ref 94–109)
CHLORIDE SERPL-SCNC: 108 MMOL/L (ref 94–109)
CO2 SERPL-SCNC: 28 MMOL/L (ref 20–32)
CO2 SERPL-SCNC: 30 MMOL/L (ref 20–32)
CO2 SERPL-SCNC: 31 MMOL/L (ref 20–32)
CREAT SERPL-MCNC: 1.13 MG/DL (ref 0.66–1.25)
CREAT SERPL-MCNC: 1.3 MG/DL (ref 0.66–1.25)
CREAT SERPL-MCNC: 1.4 MG/DL (ref 0.66–1.25)
CRP SERPL-MCNC: 31 MG/L (ref 0–8)
DIFFERENTIAL METHOD BLD: ABNORMAL
EOSINOPHIL # BLD AUTO: 0.1 10E9/L (ref 0–0.7)
EOSINOPHIL NFR BLD AUTO: 0.6 %
ERYTHROCYTE [DISTWIDTH] IN BLOOD BY AUTOMATED COUNT: 21 % (ref 10–15)
GFR SERPL CREATININE-BSD FRML MDRD: 54 ML/MIN/1.7M2
GFR SERPL CREATININE-BSD FRML MDRD: 58 ML/MIN/1.7M2
GFR SERPL CREATININE-BSD FRML MDRD: 69 ML/MIN/1.7M2
GLUCOSE BLDC GLUCOMTR-MCNC: 104 MG/DL (ref 70–99)
GLUCOSE BLDC GLUCOMTR-MCNC: 123 MG/DL (ref 70–99)
GLUCOSE BLDC GLUCOMTR-MCNC: 124 MG/DL (ref 70–99)
GLUCOSE BLDC GLUCOMTR-MCNC: 159 MG/DL (ref 70–99)
GLUCOSE BLDC GLUCOMTR-MCNC: 170 MG/DL (ref 70–99)
GLUCOSE BLDC GLUCOMTR-MCNC: 176 MG/DL (ref 70–99)
GLUCOSE BLDC GLUCOMTR-MCNC: 191 MG/DL (ref 70–99)
GLUCOSE BLDC GLUCOMTR-MCNC: 192 MG/DL (ref 70–99)
GLUCOSE BLDC GLUCOMTR-MCNC: 196 MG/DL (ref 70–99)
GLUCOSE BLDC GLUCOMTR-MCNC: 97 MG/DL (ref 70–99)
GLUCOSE SERPL-MCNC: 138 MG/DL (ref 70–99)
GLUCOSE SERPL-MCNC: 156 MG/DL (ref 70–99)
GLUCOSE SERPL-MCNC: 83 MG/DL (ref 70–99)
HCO3 BLDV-SCNC: 29 MMOL/L (ref 21–28)
HCO3 BLDV-SCNC: 31 MMOL/L (ref 21–28)
HCO3 BLDV-SCNC: 32 MMOL/L (ref 21–28)
HCT VFR BLD AUTO: 27.6 % (ref 40–53)
HGB BLD-MCNC: 8.3 G/DL (ref 13.3–17.7)
IMM GRANULOCYTES # BLD: 0.2 10E9/L (ref 0–0.4)
IMM GRANULOCYTES NFR BLD: 1.4 %
INTERPRETATION ECG - MUSE: NORMAL
LMWH PPP CHRO-ACNC: 0.14 IU/ML
LMWH PPP CHRO-ACNC: 0.35 IU/ML
LMWH PPP CHRO-ACNC: 0.44 IU/ML
LYMPHOCYTES # BLD AUTO: 2 10E9/L (ref 0.8–5.3)
LYMPHOCYTES NFR BLD AUTO: 16.4 %
MAGNESIUM SERPL-MCNC: 2.7 MG/DL (ref 1.6–2.3)
MAGNESIUM SERPL-MCNC: 2.7 MG/DL (ref 1.6–2.3)
MAGNESIUM SERPL-MCNC: 2.8 MG/DL (ref 1.6–2.3)
MCH RBC QN AUTO: 30.7 PG (ref 26.5–33)
MCHC RBC AUTO-ENTMCNC: 30.1 G/DL (ref 31.5–36.5)
MCV RBC AUTO: 102 FL (ref 78–100)
MICRO REPORT STATUS: NORMAL
MICRO REPORT STATUS: NORMAL
MONOCYTES # BLD AUTO: 1.4 10E9/L (ref 0–1.3)
MONOCYTES NFR BLD AUTO: 11.7 %
MRSA DNA SPEC QL NAA+PROBE: NORMAL
NEUTROPHILS # BLD AUTO: 8.3 10E9/L (ref 1.6–8.3)
NEUTROPHILS NFR BLD AUTO: 69.3 %
NRBC # BLD AUTO: 0 10*3/UL
NRBC BLD AUTO-RTO: 0 /100
O2/TOTAL GAS SETTING VFR VENT: ABNORMAL %
OXYHGB MFR BLDV: 44 %
OXYHGB MFR BLDV: 49 %
OXYHGB MFR BLDV: 61 %
OXYHGB MFR BLDV: 62 %
OXYHGB MFR BLDV: 66 %
PCO2 BLDV: 47 MM HG (ref 40–50)
PCO2 BLDV: 47 MM HG (ref 40–50)
PCO2 BLDV: 50 MM HG (ref 40–50)
PCO2 BLDV: 52 MM HG (ref 40–50)
PCO2 BLDV: 52 MM HG (ref 40–50)
PH BLDV: 7.38 PH (ref 7.32–7.43)
PH BLDV: 7.39 PH (ref 7.32–7.43)
PH BLDV: 7.4 PH (ref 7.32–7.43)
PH BLDV: 7.43 PH (ref 7.32–7.43)
PH BLDV: 7.43 PH (ref 7.32–7.43)
PHOSPHATE SERPL-MCNC: 2.6 MG/DL (ref 2.5–4.5)
PHOSPHATE SERPL-MCNC: 3.6 MG/DL (ref 2.5–4.5)
PLATELET # BLD AUTO: 465 10E9/L (ref 150–450)
PO2 BLDV: 31 MM HG (ref 25–47)
PO2 BLDV: 31 MM HG (ref 25–47)
PO2 BLDV: 37 MM HG (ref 25–47)
PO2 BLDV: 40 MM HG (ref 25–47)
PO2 BLDV: 41 MM HG (ref 25–47)
POTASSIUM SERPL-SCNC: 3.8 MMOL/L (ref 3.4–5.3)
POTASSIUM SERPL-SCNC: 3.9 MMOL/L (ref 3.4–5.3)
POTASSIUM SERPL-SCNC: 4.3 MMOL/L (ref 3.4–5.3)
PROT SERPL-MCNC: 7.2 G/DL (ref 6.8–8.8)
RBC # BLD AUTO: 2.7 10E12/L (ref 4.4–5.9)
SODIUM SERPL-SCNC: 144 MMOL/L (ref 133–144)
SODIUM SERPL-SCNC: 144 MMOL/L (ref 133–144)
SODIUM SERPL-SCNC: 145 MMOL/L (ref 133–144)
SPECIMEN SOURCE: NORMAL
WBC # BLD AUTO: 11.9 10E9/L (ref 4–11)

## 2017-05-02 PROCEDURE — 25000132 ZZH RX MED GY IP 250 OP 250 PS 637: Performed by: INTERNAL MEDICINE

## 2017-05-02 PROCEDURE — 83735 ASSAY OF MAGNESIUM: CPT | Performed by: INTERNAL MEDICINE

## 2017-05-02 PROCEDURE — 85520 HEPARIN ASSAY: CPT | Performed by: INTERNAL MEDICINE

## 2017-05-02 PROCEDURE — 82330 ASSAY OF CALCIUM: CPT | Performed by: INTERNAL MEDICINE

## 2017-05-02 PROCEDURE — 25000128 H RX IP 250 OP 636: Performed by: STUDENT IN AN ORGANIZED HEALTH CARE EDUCATION/TRAINING PROGRAM

## 2017-05-02 PROCEDURE — 27210437 ZZH NUTRITION PRODUCT SEMIELEM INTERMED LITER

## 2017-05-02 PROCEDURE — 25000132 ZZH RX MED GY IP 250 OP 250 PS 637

## 2017-05-02 PROCEDURE — 87641 MR-STAPH DNA AMP PROBE: CPT | Performed by: INTERNAL MEDICINE

## 2017-05-02 PROCEDURE — 20000004 ZZH R&B ICU UMMC

## 2017-05-02 PROCEDURE — 93010 ELECTROCARDIOGRAM REPORT: CPT | Performed by: INTERNAL MEDICINE

## 2017-05-02 PROCEDURE — 93306 TTE W/DOPPLER COMPLETE: CPT

## 2017-05-02 PROCEDURE — 87640 STAPH A DNA AMP PROBE: CPT | Performed by: INTERNAL MEDICINE

## 2017-05-02 PROCEDURE — T1013 SIGN LANG/ORAL INTERPRETER: HCPCS | Mod: U3

## 2017-05-02 PROCEDURE — 93005 ELECTROCARDIOGRAM TRACING: CPT

## 2017-05-02 PROCEDURE — 80048 BASIC METABOLIC PNL TOTAL CA: CPT | Performed by: INTERNAL MEDICINE

## 2017-05-02 PROCEDURE — 25000128 H RX IP 250 OP 636: Performed by: INTERNAL MEDICINE

## 2017-05-02 PROCEDURE — 25000128 H RX IP 250 OP 636

## 2017-05-02 PROCEDURE — 84100 ASSAY OF PHOSPHORUS: CPT | Performed by: INTERNAL MEDICINE

## 2017-05-02 PROCEDURE — 25000132 ZZH RX MED GY IP 250 OP 250 PS 637: Performed by: STUDENT IN AN ORGANIZED HEALTH CARE EDUCATION/TRAINING PROGRAM

## 2017-05-02 PROCEDURE — 25000125 ZZHC RX 250: Performed by: INTERNAL MEDICINE

## 2017-05-02 PROCEDURE — 25000125 ZZHC RX 250: Performed by: STUDENT IN AN ORGANIZED HEALTH CARE EDUCATION/TRAINING PROGRAM

## 2017-05-02 PROCEDURE — 86140 C-REACTIVE PROTEIN: CPT | Performed by: INTERNAL MEDICINE

## 2017-05-02 PROCEDURE — 85025 COMPLETE CBC W/AUTO DIFF WBC: CPT | Performed by: INTERNAL MEDICINE

## 2017-05-02 PROCEDURE — 40000133 ZZH STATISTIC OT WARD VISIT

## 2017-05-02 PROCEDURE — 00000146 ZZHCL STATISTIC GLUCOSE BY METER IP

## 2017-05-02 PROCEDURE — 80076 HEPATIC FUNCTION PANEL: CPT | Performed by: INTERNAL MEDICINE

## 2017-05-02 PROCEDURE — 99233 SBSQ HOSP IP/OBS HIGH 50: CPT | Mod: 25 | Performed by: INTERNAL MEDICINE

## 2017-05-02 PROCEDURE — 93306 TTE W/DOPPLER COMPLETE: CPT | Mod: 26 | Performed by: INTERNAL MEDICINE

## 2017-05-02 PROCEDURE — 97535 SELF CARE MNGMENT TRAINING: CPT | Mod: GO

## 2017-05-02 PROCEDURE — S0171 BUMETANIDE 0.5 MG: HCPCS | Performed by: INTERNAL MEDICINE

## 2017-05-02 PROCEDURE — 40000076 ZZH STATISTIC IABP MONITORING

## 2017-05-02 PROCEDURE — 71010 XR CHEST PORT 1 VW: CPT

## 2017-05-02 PROCEDURE — 40000275 ZZH STATISTIC RCP TIME EA 10 MIN

## 2017-05-02 PROCEDURE — 40000014 ZZH STATISTIC ARTERIAL MONITORING DAILY

## 2017-05-02 PROCEDURE — 82805 BLOOD GASES W/O2 SATURATION: CPT | Performed by: INTERNAL MEDICINE

## 2017-05-02 RX ORDER — HYDRALAZINE HYDROCHLORIDE 25 MG/1
25 TABLET, FILM COATED ORAL ONCE
Status: COMPLETED | OUTPATIENT
Start: 2017-05-02 | End: 2017-05-02

## 2017-05-02 RX ORDER — HYDRALAZINE HYDROCHLORIDE 50 MG/1
50 TABLET, FILM COATED ORAL EVERY 6 HOURS
Status: DISCONTINUED | OUTPATIENT
Start: 2017-05-02 | End: 2017-05-02

## 2017-05-02 RX ORDER — BUMETANIDE 0.25 MG/ML
2 INJECTION INTRAMUSCULAR; INTRAVENOUS ONCE
Status: COMPLETED | OUTPATIENT
Start: 2017-05-02 | End: 2017-05-02

## 2017-05-02 RX ADMIN — CLOPIDOGREL 75 MG: 75 TABLET, FILM COATED ORAL at 09:28

## 2017-05-02 RX ADMIN — PANTOPRAZOLE SODIUM 40 MG: 40 TABLET, DELAYED RELEASE ORAL at 09:32

## 2017-05-02 RX ADMIN — HYDRALAZINE HYDROCHLORIDE 25 MG: 50 TABLET ORAL at 09:30

## 2017-05-02 RX ADMIN — HYDRALAZINE HYDROCHLORIDE 50 MG: 50 TABLET ORAL at 18:46

## 2017-05-02 RX ADMIN — HUMAN INSULIN 4 UNITS/HR: 100 INJECTION, SOLUTION SUBCUTANEOUS at 23:51

## 2017-05-02 RX ADMIN — HUMAN INSULIN 3 UNITS/HR: 100 INJECTION, SOLUTION SUBCUTANEOUS at 13:16

## 2017-05-02 RX ADMIN — PANTOPRAZOLE SODIUM 40 MG: 40 TABLET, DELAYED RELEASE ORAL at 19:51

## 2017-05-02 RX ADMIN — POTASSIUM CHLORIDE 20 MEQ: 1.5 POWDER, FOR SOLUTION ORAL at 05:35

## 2017-05-02 RX ADMIN — MEROPENEM 1 G: 1 INJECTION, POWDER, FOR SOLUTION INTRAVENOUS at 05:32

## 2017-05-02 RX ADMIN — BUMETANIDE 2 MG: 0.25 INJECTION, SOLUTION INTRAMUSCULAR; INTRAVENOUS at 01:40

## 2017-05-02 RX ADMIN — MULTIVIT AND MINERALS-FERROUS GLUCONATE 9 MG IRON/15 ML ORAL LIQUID 15 ML: at 09:28

## 2017-05-02 RX ADMIN — HEPARIN SODIUM 750 UNITS/HR: 10000 INJECTION, SOLUTION INTRAVENOUS at 22:50

## 2017-05-02 RX ADMIN — HYDRALAZINE HYDROCHLORIDE 50 MG: 50 TABLET ORAL at 12:57

## 2017-05-02 RX ADMIN — ASPIRIN 81 MG CHEWABLE TABLET 81 MG: 81 TABLET CHEWABLE at 09:26

## 2017-05-02 RX ADMIN — MEROPENEM 1 G: 1 INJECTION, POWDER, FOR SOLUTION INTRAVENOUS at 12:58

## 2017-05-02 RX ADMIN — Medication 3.12 MG: at 12:58

## 2017-05-02 RX ADMIN — HYDRALAZINE HYDROCHLORIDE 75 MG: 50 TABLET ORAL at 23:06

## 2017-05-02 RX ADMIN — SENNOSIDES AND DOCUSATE SODIUM 2 TABLET: 8.6; 5 TABLET ORAL at 14:20

## 2017-05-02 RX ADMIN — HYDROMORPHONE HYDROCHLORIDE 0.5 MG: 1 INJECTION, SOLUTION INTRAMUSCULAR; INTRAVENOUS; SUBCUTANEOUS at 04:01

## 2017-05-02 RX ADMIN — MEROPENEM 1 G: 1 INJECTION, POWDER, FOR SOLUTION INTRAVENOUS at 21:33

## 2017-05-02 RX ADMIN — ATORVASTATIN CALCIUM 40 MG: 40 TABLET, FILM COATED ORAL at 19:50

## 2017-05-02 RX ADMIN — HYDROMORPHONE HYDROCHLORIDE 0.5 MG: 1 INJECTION, SOLUTION INTRAMUSCULAR; INTRAVENOUS; SUBCUTANEOUS at 14:44

## 2017-05-02 RX ADMIN — HYDROMORPHONE HYDROCHLORIDE 0.5 MG: 1 INJECTION, SOLUTION INTRAMUSCULAR; INTRAVENOUS; SUBCUTANEOUS at 09:23

## 2017-05-02 RX ADMIN — Medication 12.5 MG: at 23:05

## 2017-05-02 RX ADMIN — Medication 3.12 MG: at 19:51

## 2017-05-02 RX ADMIN — HYDRALAZINE HYDROCHLORIDE 25 MG: 25 TABLET ORAL at 05:32

## 2017-05-02 ASSESSMENT — PAIN DESCRIPTION - DESCRIPTORS
DESCRIPTORS: ACHING
DESCRIPTORS: ACHING

## 2017-05-02 NOTE — PROGRESS NOTES
CLINICAL NUTRITION SERVICES - REASSESSMENT NOTE     Nutrition Prescription    RECOMMENDATIONS FOR MDs/PROVIDERS TO ORDER:  Avoid interruptions to EN infusion to promote adequate kcal/PRO intake.     Malnutrition Status:    Non-severe malnutrition in the context of acute illness    Recommendations already ordered by Registered Dietitian (RD):  1. Continue to advance toward goal Peptamen 1.5 @ 50 ml/hr (1200 ml/day) to provide 1800 kcal (32 kcal/kg), 82 g PRO (1.5 g/kg), 924 ml free H2O, 67 g Fat (70% from MCTs), 226 g CHO and no Fiber daily per dosing weight of 56 kg.     2. Re-order previous supplement of Magic Cup @ 10 am and 2 pm    Future/Additional Recommendations:  If/when diet advances and PO intake starts to improve, reorder calorie counts x3 days to monitor ongoing need/approp for continuous TF. If PO intake (per initial calorie count data) shows patient meeting at least 50% of estimated need (700 kcal and 35 g PRO), consider cycling TF (Peptamen 1.5) @ 50 ml/hr x 12 hrs (8pm-8am) to provide ~50% of above provisions.       EVALUATION OF THE PROGRESS TOWARD GOALS   Diet: Full liquids, fluid restriction 1500 mL. Pt was intubated/NPO 4/25-4/28 and on 5/1. SLP previously recommended full liquids on 4/29 after extubation.    EN Access: NDT replaced yesterday by radiology after FT pulled out on 4/30.     Nutrition Support: Peptamen 1.5 currently running @ 30 mL/hr, advancing toward goal 50 ml/hr.     Intake: Average 7-day intake of 623 mL Peptamen 1.5 provided 935 kcal (17 kcal/kg or 67% of needs) and 42 g PRO (0.8 g/kg or 63% of needs). Pt received goal TF volume x 3 days (4/25-4/27) before TF was held starting 4/28.     NEW FINDINGS   Skin: stage 2 PI on coccyx per WOC 5/1 - no change. Receiving daily multivitamin with minerals and started 10 day course of Tri-vi-sol 7 ml BID and zinc sulfate 220 mg/day 4/18 - 4/28.  GI: + BM x4 yesterday (loose)    MALNUTRITION  % Intake: < 75% for > 7 days (non-severe)  %  Weight Loss: None noted (wt today of 58.4 kg > lowest wt 56.2 kg on 4/11)  Subcutaneous Fat Loss: Upper arm and Lower arm: mild  Muscle Loss: Upper arm (bicep, tricep), Lower arm (forearm), Upper leg (quadricep, hamstring), Patellar region and Posterior calf: moderate  Fluid Accumulation/Edema: Mild (L arm)  Malnutrition Diagnosis: Non-severe malnutrition in the context of acute illness    Previous Goals   Total avg nutritional intake to meet a minimum of 25 kcal/kg and 1.2 g PRO/kg daily (per dosing wt 56 kg).  Evaluation: Not met    Previous Nutrition Diagnosis  Predicted inadequate nutrient intake (protein-energy) related to mostly reliant on TF for nutrition given inadequate PO intake per calorie count data with intermittent NPO status/holding TF for procedures.   Evaluation: Declining    CURRENT NUTRITION DIAGNOSIS  Inadequate protein-energy intake related to interruptions to EN infusion as evidenced by average 7-day intake met 67% kcal and 63% PRO needs.       INTERVENTIONS  Implementation  Enteral Nutrition - Continue current regimen, advancing to goal rate.   Ordered Magic cups between meals     Goals  Total avg nutritional intake to meet a minimum of 25 kcal/kg and 1.2 g PRO/kg daily (per dosing wt 56 kg).    Monitoring/Evaluation  Progress toward goals will be monitored and evaluated per protocol.    Paula Guerrero RD, LD  CVICU Dietitian  Pager: [phone]

## 2017-05-02 NOTE — PLAN OF CARE
Problem: Goal Outcome Summary  Goal: Goal Outcome Summary  1) pt will be hemodynamically stable  2) pt will tolerate weaning of IABP  3) pain will be adequately controlled     Outcome: Improving  Nights:  NEENA  Pt bedrest until 10pm.  Right groin CDI with no hematoma.  Pt slept 6-8 hours.  Heparin gtt started last evening and Currently Hep 900Units/hr.  Recheck Hep XA at 1100.  IABP 1:1 augmenting 90%.  MAP 75-95.  Nipride and Bumex gtt d/c'd last evening.  Urine 125cc/hr out.  CVP 13.  CI 2.9.  SVO2 61%.  Tube feeding back up to 50cc/hr. Afebrile with fine crackles.    P:  Increase activity.  Recheck Hep Xa level.  Wean IABP per MD.

## 2017-05-02 NOTE — PLAN OF CARE
Problem: Individualization  Goal: Patient Preferences  Plan is to continue to medically manage. Balloon pump numbers stable. Insulin gtt between 06. Given dilaudid X2. Continue to monitor.     Duc Mart  6:26 PM  5/2/2017

## 2017-05-02 NOTE — PROGRESS NOTES
Chavis removed without complications at 1130. DTV [id number]. Continue to monitor.    Duc Mart  12:24 PM  5/2/2017

## 2017-05-02 NOTE — PROGRESS NOTES
"[facility]  Cardiology II Service / Advanced Heart Failure  4/30/2017      Interval History:   - No acute events overnight  - Weaned off Nipride, while uptitrating hydralazine.   - Heparin low dose without bolus started          Intake/Output Summary (Last 24 hours) at 05/02/17 0821  Last data filed at 05/02/17 0700   Gross per 24 hour   Intake          2685.92 ml   Output             4540 ml   Net         -1854.08 ml             Pertinent Medications:  I have reviewed this patient's current medications      hydrALAZINE  25 mg Oral Once     hydrALAZINE  50 mg Oral Q6H     sodium chloride (PF)  3 mL Intracatheter Q8H     aspirin  81 mg Oral Daily     sodium chloride (PF)  3 mL Intracatheter Q8H     clopidogrel  75 mg Oral Daily     meropenem  1 g Intravenous Q8H     heparin lock flush  5-10 mL Intracatheter Q24H     QUEtiapine  12.5 mg Oral At Bedtime     pantoprazole  40 mg Per Feeding Tube BID     atorvastatin  40 mg Oral or Feeding Tube Daily at 8 pm     pneumococcal vaccine  0.5 mL Intramuscular Once     sodium chloride (PF)  3 mL Intracatheter Q8H     multivitamins with minerals  15 mL Per Feeding Tube Daily         Examination:  /66  Temp 97.9  F (36.6  C) (Axillary)  Resp 22  Ht 1.575 m (5' 2.01\")  Wt 58.4 kg (128 lb 12 oz)  SpO2 100%  BMI 23.54 kg/m2  GEN: Intubated and sedated  NECK: can not assess JVP   RESP: +crackles   CV: S1S2S3, no murmurs that i could auscultate  ABD: Soft, nontender to palpation, no HSM, +BS, no guarding  EXT: No peripheral edema, warm/well perfused   SKIN: Normal skin turgor, no rash     Data:  CMP  Recent Labs  Lab 05/02/17  0416 05/01/17  1842 05/01/17  1542 05/01/17  1158 05/01/17  1029  05/01/17  0342  04/30/17  0401  04/28/17  0438 04/27/17 2026    141 143  --  141  < > 143  < > 142  < > 142  --    POTASSIUM 3.9 4.0 4.4 3.4 4.0  < > 3.8  < > 3.4  < > 4.8 3.9   CHLORIDE 107 105 107  --   --   --  107  < > 104  < > 104  --  "   CO2 28 28 27  --   --   --  26  < > 27  < > 29  --    ANIONGAP 8 8 9  --   --   --  9  < > 11  < > 9  --    * 115* 127*  --  122*  < > 105*  < > 104*  < > 150*  --    BUN 38* 41* 41*  --   --   --  41*  < > 40*  < > 33*  --    CR 1.40* 1.48* 1.52*  --   --   --  1.40*  < > 1.35*  < > 1.29*  --    GFRESTIMATED 54* 50* 49*  --   --   --  54*  < > 56*  < > 59*  --    GFRESTBLACK 65 61 59*  --   --   --  65  < > 68  < > 71  --    ROB 8.4* 8.3* 7.6*  --   --   --  7.7*  < > 7.9*  < > 7.6*  --    MAG 2.7* 2.7*  --  2.4*  --   --  2.8*  < > 2.9*  < > 2.6* 2.6*   PHOS 3.6 3.7  --   --   --   --   --   --   --   --  3.9 2.4*   PROTTOTAL 7.2 7.5  --   --   --   --  6.6*  --  6.8  < > 6.6*  --    ALBUMIN 2.3* 2.5*  --   --   --   --  2.1*  --  2.1*  < > 1.8*  --    BILITOTAL 0.6 0.4  --   --   --   --  0.6  --  0.5  < > 0.5  --    ALKPHOS 76 86  --   --   --   --  71  --  77  < > 86  --    AST 36 39  --   --   --   --  31  --  34  < > 29  --    ALT 22 27  --   --   --   --  21  --  21  < > 24  --    < > = values in this interval not displayed.  CBC    Recent Labs  Lab 05/02/17  0416 05/01/17  1842 05/01/17  1158 05/01/17  1029  05/01/17  0342 04/30/17  0401   WBC 11.9* 12.8*  --   --   --  10.6 12.6*   RBC 2.70* 2.77*  --   --   --  2.53* 2.56*   HGB 8.3* 8.4* 7.7* 7.2*  < > 7.4* 7.6*   HCT 27.6* 28.4*  --   --   --  25.7* 25.4*   * 103*  --   --   --  102* 99   MCH 30.7 30.3  --   --   --  29.2 29.7   MCHC 30.1* 29.6*  --   --   --  28.8* 29.9*   RDW 21.0* 20.4*  --   --   --  19.9* 19.7*   * 545*  --   --   --  436 444   < > = values in this interval not displayed.  INR  No lab results found in last 7 days.  Arterial Blood Gas    Recent Labs  Lab 05/02/17  0340 05/01/17  1842 05/01/17  1542 05/01/17  1404  05/01/17  1157   PH  --  7.45 Incorrect specimen typeCORRECTED ON 05/01 AT 1726: PREVIOUSLY REPORTED AS 7.39 7.45  --  7.42   PCO2  --  38 Incorrect specimen typeCORRECTED ON 05/01 AT 1726:  PREVIOUSLY REPORTED AS 46 37  --  40   PO2  --  89 Incorrect specimen typeCORRECTED ON 05/01 AT 1726: PREVIOUSLY REPORTED AS 40 79*  --  171*   HCO3  --  27 Incorrect specimen typeCORRECTED ON 05/01 AT 1726: PREVIOUSLY REPORTED AS 28 26  --  26   O2PER 2L 3L Incorrect specimen typeCORRECTED ON 05/01 AT 1726: PREVIOUSLY REPORTED AS 3L  3L 30  < > 50   < > = values in this interval not displayed.    Imaging Studies:    CTA heaf and neck  Impression:   1. Head CTA demonstrates at least moderate stenosis of both cavernous  internal carotid arteries. Diffuse mild to moderate narrowing of the  basilar artery. Additional areas of mild bilateral M2 segment  narrowing, presumably also related to atherosclerosis.  2. No aneurysm of the major intracranial arteries.   3. Neck CTA demonstrates no carotid artery stenosis. Short segment  mild narrowing of the proximal right V2 segment.  4. No change in probable bilateral cerebral white matter infarcts. No  acute intracranial hemorrhage    Echo 3/27  The visual ejection fraction is estimated at 30-35%.  There is extensive inferior, inferoseptal, and inferolateral wall akinesis.  Basal/mid lateral wall hypokinesis  There is moderate to severe septal hypokinesis.  Septal wall motion abnormality may reflect pacemaker activation.  There is a catheter/pacemaker lead seen in the right ventricle.  The right ventricle is mildly dilated.  Severely decreased right ventricular systolic function  There is no pericardial effusion.  The rhythm was paced.  Compared to the prior study, the LVEF appears somewhat decreased. Septal wall  motion abnormality larger- may reflect, in part, that patient in sinus tach on  prior study, and ventricular paced now. No hemodynamically significant  valvular abnormalities on 2D or color flow imaging     CT chest/abdomen 3/27   IMPRESSION:   1. Small mediastinal hemorrhage, small bilateral pleural effusions  which may be hemorrhagic. Small intraperitoneal  hemorrhage. Minimal  pericardial hemorrhage.  2. Minimal pneumoperitoneum in the left paracolic gutter, of unknown  significance. No pneumatosis as clinically questioned.  3. IABP slightly low in position.  4. Bilateral pulmonary dependent consolidations represent compression  atelectasis, however differentials include aspiration.     Cath 3/26 [facility]  SUMMARY:   --Inferior STEMI w/ late presentation. Culprit dictated by complete  heart block, and cardiogenic shock. Emergent echo in the Cath Lab also  shows significant RV dysfunction.  --Three vessel coronary artery disease with diffuse coronary disease  out to the distal vessels  --Successful POBA of the prox and mid-RCA. Stent was not placed, in  anticipation he may need to be considered for bypass surgery in the  near future  --Insertion of a temporary pacemaker for bradycardia (AVB) and  hypotension  --Insertion of a IABP  --Upper GI bleed consistent with Kaylin-Bonilla     [facility] 3/27     CT head 3/28: normal      Echo 3/29  Interpretation Summary  Limited study. Ischemic CM. Moderately (EF 30-35%) reduced left ventricular  function is present. Traced at 32%.  Posterior wall akinesis is present. Lateral wall akinesis is present. Inferior  wall akinesis is present.  Right ventricular function, chamber size, wall motion, and thickness are  normal.  Dilation of the inferior vena cava is present with abnormal respiratory  variation in diameter.     Compared to prior study, RV fxn is improved.     Echo 3/31  Left Ventricle  The Ejection Fraction is estimated at 50-55%. Inferior,posterior,lateral,basal  septal wall akinesis as reported before. No change.     CORONARY ANGIOGRAM 4/1:   1. Both coronary arteries arise from their respective cusps.  2. Right dominant.  3. LM has mild luminal irregularities.   4. LAD supplies the apex along with the RCA (type 2 LAD) and gives rise to septal perforators and a large and branching D1. There are widely  patent stents extending from the proximal to mid LAD. The dLAD has mild to moderate disease to 30% stenosis. The D1 is jailed by the LAD stents, but has EBER 3 flow with disease to 30% stenosis.   5. The small ramus is no longer present.  6. LCX is occluded at the ostium.   7. RCA gives rise to PL branches and supplies PDA. There are widely patent stents extending from the proximal to mid RCA. The remainder of the mRCA has disease to 50% stenosis and the dRCA has disease to 10% stenosis. The RPDA has a widely patent stent and the remainder of the artery has mild disease to 10% stenosis. The RPLA has small vessels with diffuse disease including a distal branch occlusion. The RPLA supplies some small collateral branches to the dLCx.      COMPLICATIONS:  1. None      SUMMARY:   1. Cardiogenic shock following an inferior STEMI.  2. The LCx re-occlusion is not surprising as the recently revascularized LCx  had poor outflow and the revascularized portion did not supply a large territory (limited to no flow was present distal to the stented segment immediately following stenting). Repeat revascularization of the LCx would result in the same outcome of repeat closure and also risk embolizing thrombus from the LCx into the LAD so no treatment of the occluded LCx was performed.   3. Three vessel coronary artery disease with patent LAD and RCA stents and an occluded LCx.     Echo 4/3  Left ventricular size is normal.  The Ejection Fraction is estimated at 35-40%.  Inferior wall akinesis is present.  Lateral wall akinesis is present.  Posterior wall akinesis is present.  Right ventricular function, chamber size, wall motion, and thickness are  normal.  Moderate mitral insufficiency is present.  Moderate tricuspid insufficiency is present.  Right ventricular systolic pressure is 47mmHg above the right atrial pressure.  The inferior vena cava is normal.     Echo 4/5  Moderately (EF 35-40%) reduced left ventricular function is  present. Traced at  40%.  Moderate mitral insufficiency is present.  The cause of the mitral insufficiency appears to be restricted posterior  leaflet from posterior MI. No mitral leaflet pathology seen.  Dilation of the inferior vena cava is present with abnormal respiratory  variation in diameter.     CT abd without contrast 4/4  IMPRESSION:   1. No evidence of ischemic bowel, obstruction, or intra-abdominal  infection.  2. Bibasilar patchy pulmonary opacities likely represent combination  of aspiration and atelectasis.  3. Small amount of simple free fluid within the lower abdomen.  4. Small left retroperitoneal hematoma along the iliac vasculature  likely postprocedural.   5. Unchanged size of bilateral pleural effusions, which now consist of  simple fluid.    Assessment and Plan  51 year old man presented with cardiogenic shock from inferior STEMI s/p RCA aspiration thrombectomy and POBA on 3/26 at [facility] s/p IABP and initiation of Dopamine, also during procedure, CHB s/p temp pacer, emesis/hematmesis and altered mental status s/p intubation transferred here for consideration of mechanical support on 3/27. Had PCI to RCA, LAD and LCx (on ASA and plavix) started on epinephrine in addition to dopamine, post procedure developed distributive shock picture from infection (aspiration pneumonia? Sputum cx growing staph aureus, on vanco and zosyn) vs post-MI SIRS response. Currently in cardiogenic shock with IABP in situ    Card  # Cardiogenic shock 4/3- from underlying 3v CAD-has had high SVR and low CI, complicated by ischemic MR  # Due to inferior wall STEMI. S/P 7 stents to LAD, circ, RCA (angiogram report pending). Now with IABP, temporary pacemaker after 3rd deg heart block in cath lab at [facility] on 3/26   # Small pericardial hemorrhage   # Remains unclear why we are having a hard time weaning him off of IABP in the setting of EF of 3-35%. Microvascular disease is a possibility.  - IABP (subclavian) in  place  - Cortisol normal  - IABP wean down to 1:2, when Xa is therapeutic  - Uptitrate Hydralazine as tolerated  - End Vanc and Meropenem today      # Neuro  - CVA presenting with left hand weakness and pain  - CTA head/neck shows no hemorrhage but new areas of low attenuation in b/l hemispheres  - Neuro following.  - MAP goal > 70        Respiratory  -Hypoxic today placed on face mask  - Intubated in the setting of worsening hypoxia on 4/25, extubated 4/28  - Possible causes: aspiration PNA vs PE vs volume overload   - Diuresing to CVP < 12, on heparin inf for IABP, and being with Vanc and Pip/tazo  - Pulmonary consulted,appreciate recs           # ID  - UA 4/15 with cloudy urine, 50 WBCs, many bacteria, no nitrates. UCx grew E coli - treated with CFTX (sensitive)  - Fever on 4/20 and single spike 100.2 overnight with blood cultures in process (no growth thus far), PICC and SGC removed, started on Vanc and pip/tazo  - Vanc 4/19 - 5/2. Zosyn 4/19 - 4/25. Hernan 4/26 - 5/2         # Endocrine  T2DM: HA1C 7.5 on admission.  - Insulin gtt per nurising protocol with hypoglycemia precautions.   - Switch to SQ Lantus 4/22      # GI  - Had several maroon BMs since 4/11, Hb overall dropped by a 1.5 grams or so, heparin inf was held and GI consulted  - Colonoscopy 4/17 showed a rectal ulcer but no intervenable lesions  - Will continue to monitor Hb, back on heparin inf  - Protonix 40 PO BID         # Renal/  - Daily Cr  - Avoid nephrotoxins as able  - Plan to keep CVP < 12     -Hypernatremia  - Resolved. Stopped his D5W, only getting FWF's.        FEN: feeding tube, and dysphagia level II  Code: FULL  PPx: PPI 40mg BID, senna PRN  Dispo: pending stability    LINES:  - SC IABP  4/19  - LUE PICC   4/20  - Chavis catheter  4/20  - Lt IJ                            4/29        Plan of care discussed with Dr. Jamison Loving MD  Cardiovascular Disease Fellow  Pager: [phone]

## 2017-05-02 NOTE — PLAN OF CARE
Problem: Goal Outcome Summary  Goal: Goal Outcome Summary  1) pt will be hemodynamically stable  2) pt will tolerate weaning of IABP  3) pain will be adequately controlled     OT 4E: Pt progressing in functional transfers and activity tolerance for ADLs. Pt alert and following all commands. Pt supine <> EOB with mod A and verbal/tactile cues. Pt dangled EOB +8 minutes with CGA-min A, pt with L trunk lean with fatigue. Pt completed sit <> stands x3 attempts with mod Ax2 and blocking at bilateral knees. Pt unable to weight shift and dependently lifted to bedside chair. Recommend RN continue to use lift for safety with functional transfers. Pt's max  and intermittent dizziness despite BP within parameters.      Rec TCU when medically stable to progress independence with ADLs and functional mobility.

## 2017-05-02 NOTE — PLAN OF CARE
Problem: Goal Outcome Summary  Goal: Goal Outcome Summary  1) pt will be hemodynamically stable  2) pt will tolerate weaning of IABP  3) pain will be adequately controlled     Outcome: Improving  Nipride stopped per Dr. Loving- goal map > 60, systolic < 150. Hydralazine  IV & PO started & given. L venous sheath removed per RN. L arterial sheath removed per Dr. Norwood. Both CDI no hematoma, CMS intact. Leg brace applied to keep patients leg from moving until off bedrest. IABP @ 1:1, plan to move to 2:1 when heparin restarted and therapeutic.  C/o pain- dilaudid given early ok per Dr. Loving. Feeding tube placed per radiology- ok to use per dr. Solis.  Continues to have L sided weakness & confusion to situation/time, otherwise neuros intact. Great uop with bumex gtt, > 300cc/hour, electrolytes checked, no replacements needed. Will continue to monitor and follow plan of care.    Problem: Intra-Aortic Balloon Pump (Adult)  Goal: Signs and Symptoms of Listed Potential Problems Will be Absent or Manageable (Intra-Aortic Balloon Pump)  Signs and symptoms of listed potential problems will be absent or manageable by discharge/transition of care (reference Intra-Aortic Balloon Pump (Adult) CPG).   Outcome: No Change    05/01/17 1600 05/01/17 1949   Intra-Aortic Balloon Pump   Problems Assessed (Intra-Aortic Balloon Pump) --  all   Problems Present (Intra-Aortic Balloon Pump) hemodynamic instability;mechanical dysfunction;inability to wean --

## 2017-05-03 ENCOUNTER — APPOINTMENT (OUTPATIENT)
Dept: GENERAL RADIOLOGY | Facility: CLINIC | Age: 52
DRG: 228 | End: 2017-05-03
Attending: INTERNAL MEDICINE
Payer: COMMERCIAL

## 2017-05-03 ENCOUNTER — APPOINTMENT (OUTPATIENT)
Dept: PHYSICAL THERAPY | Facility: CLINIC | Age: 52
DRG: 228 | End: 2017-05-03
Attending: INTERNAL MEDICINE
Payer: COMMERCIAL

## 2017-05-03 ENCOUNTER — APPOINTMENT (OUTPATIENT)
Dept: OCCUPATIONAL THERAPY | Facility: CLINIC | Age: 52
DRG: 228 | End: 2017-05-03
Attending: INTERNAL MEDICINE
Payer: COMMERCIAL

## 2017-05-03 ENCOUNTER — OFFICE VISIT (OUTPATIENT)
Dept: INTERPRETER SERVICES | Facility: CLINIC | Age: 52
End: 2017-05-03

## 2017-05-03 LAB
ALBUMIN SERPL-MCNC: 2.1 G/DL (ref 3.4–5)
ALP SERPL-CCNC: 65 U/L (ref 40–150)
ALT SERPL W P-5'-P-CCNC: 20 U/L (ref 0–70)
ANION GAP SERPL CALCULATED.3IONS-SCNC: 5 MMOL/L (ref 3–14)
ANION GAP SERPL CALCULATED.3IONS-SCNC: 6 MMOL/L (ref 3–14)
APTT PPP: 68 SEC (ref 22–37)
AST SERPL W P-5'-P-CCNC: 35 U/L (ref 0–45)
BASE EXCESS BLDA CALC-SCNC: 3.4 MMOL/L
BASE EXCESS BLDV CALC-SCNC: 4.8 MMOL/L
BASE EXCESS BLDV CALC-SCNC: 5.9 MMOL/L
BASE EXCESS BLDV CALC-SCNC: 6.9 MMOL/L
BASE EXCESS BLDV CALC-SCNC: 7.2 MMOL/L
BASE EXCESS BLDV CALC-SCNC: 8.1 MMOL/L
BASE EXCESS BLDV CALC-SCNC: 8.5 MMOL/L
BASOPHILS # BLD AUTO: 0.1 10E9/L (ref 0–0.2)
BASOPHILS NFR BLD AUTO: 0.6 %
BILIRUB DIRECT SERPL-MCNC: 0.1 MG/DL (ref 0–0.2)
BILIRUB SERPL-MCNC: 0.4 MG/DL (ref 0.2–1.3)
BUN SERPL-MCNC: 29 MG/DL (ref 7–30)
BUN SERPL-MCNC: 30 MG/DL (ref 7–30)
CA-I BLD-MCNC: 4.6 MG/DL (ref 4.4–5.2)
CALCIUM SERPL-MCNC: 7.5 MG/DL (ref 8.5–10.1)
CALCIUM SERPL-MCNC: 8.2 MG/DL (ref 8.5–10.1)
CHLORIDE SERPL-SCNC: 103 MMOL/L (ref 94–109)
CHLORIDE SERPL-SCNC: 109 MMOL/L (ref 94–109)
CO2 SERPL-SCNC: 30 MMOL/L (ref 20–32)
CO2 SERPL-SCNC: 34 MMOL/L (ref 20–32)
CREAT SERPL-MCNC: 0.95 MG/DL (ref 0.66–1.25)
CREAT SERPL-MCNC: 0.95 MG/DL (ref 0.66–1.25)
CRP SERPL-MCNC: 22 MG/L (ref 0–8)
DIFFERENTIAL METHOD BLD: ABNORMAL
EOSINOPHIL # BLD AUTO: 0.2 10E9/L (ref 0–0.7)
EOSINOPHIL NFR BLD AUTO: 1.5 %
ERYTHROCYTE [DISTWIDTH] IN BLOOD BY AUTOMATED COUNT: 21.1 % (ref 10–15)
ERYTHROCYTE [DISTWIDTH] IN BLOOD BY AUTOMATED COUNT: 21.2 % (ref 10–15)
GFR SERPL CREATININE-BSD FRML MDRD: 84 ML/MIN/1.7M2
GFR SERPL CREATININE-BSD FRML MDRD: 84 ML/MIN/1.7M2
GLUCOSE BLDC GLUCOMTR-MCNC: 129 MG/DL (ref 70–99)
GLUCOSE BLDC GLUCOMTR-MCNC: 141 MG/DL (ref 70–99)
GLUCOSE BLDC GLUCOMTR-MCNC: 141 MG/DL (ref 70–99)
GLUCOSE BLDC GLUCOMTR-MCNC: 158 MG/DL (ref 70–99)
GLUCOSE BLDC GLUCOMTR-MCNC: 159 MG/DL (ref 70–99)
GLUCOSE BLDC GLUCOMTR-MCNC: 159 MG/DL (ref 70–99)
GLUCOSE BLDC GLUCOMTR-MCNC: 163 MG/DL (ref 70–99)
GLUCOSE BLDC GLUCOMTR-MCNC: 181 MG/DL (ref 70–99)
GLUCOSE BLDC GLUCOMTR-MCNC: 224 MG/DL (ref 70–99)
GLUCOSE BLDC GLUCOMTR-MCNC: 245 MG/DL (ref 70–99)
GLUCOSE BLDC GLUCOMTR-MCNC: 77 MG/DL (ref 70–99)
GLUCOSE BLDC GLUCOMTR-MCNC: 97 MG/DL (ref 70–99)
GLUCOSE SERPL-MCNC: 112 MG/DL (ref 70–99)
GLUCOSE SERPL-MCNC: 119 MG/DL (ref 70–99)
HCO3 BLD-SCNC: 27 MMOL/L (ref 21–28)
HCO3 BLDV-SCNC: 30 MMOL/L (ref 21–28)
HCO3 BLDV-SCNC: 31 MMOL/L (ref 21–28)
HCO3 BLDV-SCNC: 32 MMOL/L (ref 21–28)
HCO3 BLDV-SCNC: 32 MMOL/L (ref 21–28)
HCO3 BLDV-SCNC: 33 MMOL/L (ref 21–28)
HCO3 BLDV-SCNC: 33 MMOL/L (ref 21–28)
HCT VFR BLD AUTO: 24.8 % (ref 40–53)
HCT VFR BLD AUTO: 25.8 % (ref 40–53)
HGB BLD-MCNC: 7.2 G/DL (ref 13.3–17.7)
HGB BLD-MCNC: 7.5 G/DL (ref 13.3–17.7)
IMM GRANULOCYTES # BLD: 0.1 10E9/L (ref 0–0.4)
IMM GRANULOCYTES NFR BLD: 1.2 %
INR PPP: 1.25 (ref 0.86–1.14)
INTERPRETATION ECG - MUSE: NORMAL
LMWH PPP CHRO-ACNC: 0.16 IU/ML
LYMPHOCYTES # BLD AUTO: 1.9 10E9/L (ref 0.8–5.3)
LYMPHOCYTES NFR BLD AUTO: 18.3 %
MAGNESIUM SERPL-MCNC: 2.5 MG/DL (ref 1.6–2.3)
MAGNESIUM SERPL-MCNC: 2.6 MG/DL (ref 1.6–2.3)
MCH RBC QN AUTO: 30.1 PG (ref 26.5–33)
MCH RBC QN AUTO: 30.5 PG (ref 26.5–33)
MCHC RBC AUTO-ENTMCNC: 29 G/DL (ref 31.5–36.5)
MCHC RBC AUTO-ENTMCNC: 29.1 G/DL (ref 31.5–36.5)
MCV RBC AUTO: 104 FL (ref 78–100)
MCV RBC AUTO: 105 FL (ref 78–100)
MONOCYTES # BLD AUTO: 0.9 10E9/L (ref 0–1.3)
MONOCYTES NFR BLD AUTO: 9 %
NEUTROPHILS # BLD AUTO: 7 10E9/L (ref 1.6–8.3)
NEUTROPHILS NFR BLD AUTO: 69.4 %
NRBC # BLD AUTO: 0 10*3/UL
NRBC BLD AUTO-RTO: 0 /100
O2/TOTAL GAS SETTING VFR VENT: 21 %
O2/TOTAL GAS SETTING VFR VENT: ABNORMAL %
OXYHGB MFR BLDV: 41 %
OXYHGB MFR BLDV: 53 %
OXYHGB MFR BLDV: 57 %
OXYHGB MFR BLDV: 58 %
OXYHGB MFR BLDV: 60 %
OXYHGB MFR BLDV: 65 %
PCO2 BLD: 36 MM HG (ref 35–45)
PCO2 BLDV: 47 MM HG (ref 40–50)
PCO2 BLDV: 49 MM HG (ref 40–50)
PCO2 BLDV: 49 MM HG (ref 40–50)
PCO2 BLDV: 50 MM HG (ref 40–50)
PCO2 BLDV: 50 MM HG (ref 40–50)
PCO2 BLDV: 52 MM HG (ref 40–50)
PH BLD: 7.49 PH (ref 7.35–7.45)
PH BLDV: 7.39 PH (ref 7.32–7.43)
PH BLDV: 7.41 PH (ref 7.32–7.43)
PH BLDV: 7.42 PH (ref 7.32–7.43)
PH BLDV: 7.42 PH (ref 7.32–7.43)
PH BLDV: 7.43 PH (ref 7.32–7.43)
PH BLDV: 7.44 PH (ref 7.32–7.43)
PHOSPHATE SERPL-MCNC: 1.8 MG/DL (ref 2.5–4.5)
PLATELET # BLD AUTO: 399 10E9/L (ref 150–450)
PLATELET # BLD AUTO: 414 10E9/L (ref 150–450)
PO2 BLD: 132 MM HG (ref 80–105)
PO2 BLDV: 28 MM HG (ref 25–47)
PO2 BLDV: 35 MM HG (ref 25–47)
PO2 BLDV: 36 MM HG (ref 25–47)
PO2 BLDV: 40 MM HG (ref 25–47)
POTASSIUM SERPL-SCNC: 4 MMOL/L (ref 3.4–5.3)
POTASSIUM SERPL-SCNC: 4.2 MMOL/L (ref 3.4–5.3)
PROT SERPL-MCNC: 6.2 G/DL (ref 6.8–8.8)
RBC # BLD AUTO: 2.39 10E12/L (ref 4.4–5.9)
RBC # BLD AUTO: 2.46 10E12/L (ref 4.4–5.9)
SODIUM SERPL-SCNC: 142 MMOL/L (ref 133–144)
SODIUM SERPL-SCNC: 144 MMOL/L (ref 133–144)
WBC # BLD AUTO: 10.1 10E9/L (ref 4–11)
WBC # BLD AUTO: 9.7 10E9/L (ref 4–11)

## 2017-05-03 PROCEDURE — 86140 C-REACTIVE PROTEIN: CPT | Performed by: INTERNAL MEDICINE

## 2017-05-03 PROCEDURE — 83735 ASSAY OF MAGNESIUM: CPT | Performed by: INTERNAL MEDICINE

## 2017-05-03 PROCEDURE — 40000133 ZZH STATISTIC OT WARD VISIT: Performed by: OCCUPATIONAL THERAPIST

## 2017-05-03 PROCEDURE — 97110 THERAPEUTIC EXERCISES: CPT | Mod: GP | Performed by: REHABILITATION PRACTITIONER

## 2017-05-03 PROCEDURE — 25000132 ZZH RX MED GY IP 250 OP 250 PS 637

## 2017-05-03 PROCEDURE — 25000132 ZZH RX MED GY IP 250 OP 250 PS 637: Performed by: STUDENT IN AN ORGANIZED HEALTH CARE EDUCATION/TRAINING PROGRAM

## 2017-05-03 PROCEDURE — 85027 COMPLETE CBC AUTOMATED: CPT

## 2017-05-03 PROCEDURE — 40000193 ZZH STATISTIC PT WARD VISIT: Performed by: REHABILITATION PRACTITIONER

## 2017-05-03 PROCEDURE — 25000132 ZZH RX MED GY IP 250 OP 250 PS 637: Performed by: INTERNAL MEDICINE

## 2017-05-03 PROCEDURE — 80048 BASIC METABOLIC PNL TOTAL CA: CPT | Performed by: INTERNAL MEDICINE

## 2017-05-03 PROCEDURE — T1013 SIGN LANG/ORAL INTERPRETER: HCPCS | Mod: U3

## 2017-05-03 PROCEDURE — 85730 THROMBOPLASTIN TIME PARTIAL: CPT | Performed by: INTERNAL MEDICINE

## 2017-05-03 PROCEDURE — 82805 BLOOD GASES W/O2 SATURATION: CPT | Performed by: INTERNAL MEDICINE

## 2017-05-03 PROCEDURE — 00000146 ZZHCL STATISTIC GLUCOSE BY METER IP

## 2017-05-03 PROCEDURE — 40000076 ZZH STATISTIC IABP MONITORING

## 2017-05-03 PROCEDURE — 40000014 ZZH STATISTIC ARTERIAL MONITORING DAILY

## 2017-05-03 PROCEDURE — 40000048 ZZH STATISTIC DAILY SWAN MONITORING

## 2017-05-03 PROCEDURE — 84100 ASSAY OF PHOSPHORUS: CPT | Performed by: INTERNAL MEDICINE

## 2017-05-03 PROCEDURE — 25000125 ZZHC RX 250: Performed by: STUDENT IN AN ORGANIZED HEALTH CARE EDUCATION/TRAINING PROGRAM

## 2017-05-03 PROCEDURE — 25000125 ZZHC RX 250: Performed by: INTERNAL MEDICINE

## 2017-05-03 PROCEDURE — 71010 XR CHEST PORT 1 VW: CPT

## 2017-05-03 PROCEDURE — 85025 COMPLETE CBC W/AUTO DIFF WBC: CPT | Performed by: INTERNAL MEDICINE

## 2017-05-03 PROCEDURE — 25000128 H RX IP 250 OP 636: Performed by: STUDENT IN AN ORGANIZED HEALTH CARE EDUCATION/TRAINING PROGRAM

## 2017-05-03 PROCEDURE — 85520 HEPARIN ASSAY: CPT | Performed by: INTERNAL MEDICINE

## 2017-05-03 PROCEDURE — 82330 ASSAY OF CALCIUM: CPT | Performed by: INTERNAL MEDICINE

## 2017-05-03 PROCEDURE — 97535 SELF CARE MNGMENT TRAINING: CPT | Mod: GO | Performed by: OCCUPATIONAL THERAPIST

## 2017-05-03 PROCEDURE — 25000128 H RX IP 250 OP 636: Performed by: INTERNAL MEDICINE

## 2017-05-03 PROCEDURE — 20000004 ZZH R&B ICU UMMC

## 2017-05-03 PROCEDURE — 80076 HEPATIC FUNCTION PANEL: CPT | Performed by: INTERNAL MEDICINE

## 2017-05-03 PROCEDURE — 97530 THERAPEUTIC ACTIVITIES: CPT | Mod: GP | Performed by: REHABILITATION PRACTITIONER

## 2017-05-03 PROCEDURE — 25000131 ZZH RX MED GY IP 250 OP 636 PS 637

## 2017-05-03 PROCEDURE — S0171 BUMETANIDE 0.5 MG: HCPCS | Performed by: STUDENT IN AN ORGANIZED HEALTH CARE EDUCATION/TRAINING PROGRAM

## 2017-05-03 PROCEDURE — 40000196 ZZH STATISTIC RAPCV CVP MONITORING

## 2017-05-03 PROCEDURE — 27210437 ZZH NUTRITION PRODUCT SEMIELEM INTERMED LITER

## 2017-05-03 PROCEDURE — 85610 PROTHROMBIN TIME: CPT | Performed by: INTERNAL MEDICINE

## 2017-05-03 PROCEDURE — 40000275 ZZH STATISTIC RCP TIME EA 10 MIN

## 2017-05-03 PROCEDURE — 82803 BLOOD GASES ANY COMBINATION: CPT | Performed by: INTERNAL MEDICINE

## 2017-05-03 RX ORDER — DIGOXIN 125 MCG
125 TABLET ORAL DAILY
Status: DISCONTINUED | OUTPATIENT
Start: 2017-05-03 | End: 2017-05-12 | Stop reason: HOSPADM

## 2017-05-03 RX ORDER — NICOTINE POLACRILEX 4 MG
15-30 LOZENGE BUCCAL
Status: DISCONTINUED | OUTPATIENT
Start: 2017-05-03 | End: 2017-05-12 | Stop reason: HOSPADM

## 2017-05-03 RX ORDER — BUMETANIDE 0.25 MG/ML
2 INJECTION INTRAMUSCULAR; INTRAVENOUS ONCE
Status: COMPLETED | OUTPATIENT
Start: 2017-05-03 | End: 2017-05-03

## 2017-05-03 RX ORDER — DEXTROSE MONOHYDRATE 25 G/50ML
25-50 INJECTION, SOLUTION INTRAVENOUS
Status: DISCONTINUED | OUTPATIENT
Start: 2017-05-03 | End: 2017-05-12 | Stop reason: HOSPADM

## 2017-05-03 RX ADMIN — HYDROMORPHONE HYDROCHLORIDE 0.5 MG: 1 INJECTION, SOLUTION INTRAMUSCULAR; INTRAVENOUS; SUBCUTANEOUS at 01:55

## 2017-05-03 RX ADMIN — INSULIN ASPART 1 UNITS: 100 INJECTION, SOLUTION INTRAVENOUS; SUBCUTANEOUS at 16:11

## 2017-05-03 RX ADMIN — POTASSIUM PHOSPHATE, MONOBASIC AND POTASSIUM PHOSPHATE, DIBASIC 20 MMOL: 224; 236 INJECTION, SOLUTION INTRAVENOUS at 06:43

## 2017-05-03 RX ADMIN — PANTOPRAZOLE SODIUM 40 MG: 40 TABLET, DELAYED RELEASE ORAL at 20:08

## 2017-05-03 RX ADMIN — HYDRALAZINE HYDROCHLORIDE 75 MG: 50 TABLET ORAL at 10:58

## 2017-05-03 RX ADMIN — OXYCODONE HYDROCHLORIDE 10 MG: 5 TABLET ORAL at 08:11

## 2017-05-03 RX ADMIN — HYDRALAZINE HYDROCHLORIDE 75 MG: 50 TABLET ORAL at 22:49

## 2017-05-03 RX ADMIN — OXYCODONE HYDROCHLORIDE 10 MG: 5 TABLET ORAL at 16:32

## 2017-05-03 RX ADMIN — ASPIRIN 81 MG CHEWABLE TABLET 81 MG: 81 TABLET CHEWABLE at 08:11

## 2017-05-03 RX ADMIN — PANTOPRAZOLE SODIUM 40 MG: 40 TABLET, DELAYED RELEASE ORAL at 08:12

## 2017-05-03 RX ADMIN — INSULIN ASPART 1 UNITS: 100 INJECTION, SOLUTION INTRAVENOUS; SUBCUTANEOUS at 11:53

## 2017-05-03 RX ADMIN — POTASSIUM CHLORIDE 20 MEQ: 29.8 INJECTION, SOLUTION INTRAVENOUS at 05:41

## 2017-05-03 RX ADMIN — Medication 6.25 MG: at 11:45

## 2017-05-03 RX ADMIN — Medication 6.25 MG: at 19:31

## 2017-05-03 RX ADMIN — Medication 3.12 MG: at 04:36

## 2017-05-03 RX ADMIN — Medication 12.5 MG: at 22:05

## 2017-05-03 RX ADMIN — INSULIN GLARGINE 25 UNITS: 100 INJECTION, SOLUTION SUBCUTANEOUS at 09:22

## 2017-05-03 RX ADMIN — BUMETANIDE 2 MG: 0.25 INJECTION, SOLUTION INTRAMUSCULAR; INTRAVENOUS at 11:46

## 2017-05-03 RX ADMIN — HYDRALAZINE HYDROCHLORIDE 75 MG: 50 TABLET ORAL at 04:36

## 2017-05-03 RX ADMIN — MULTIVIT AND MINERALS-FERROUS GLUCONATE 9 MG IRON/15 ML ORAL LIQUID 15 ML: at 08:11

## 2017-05-03 RX ADMIN — DIGOXIN 125 MCG: 0.12 TABLET ORAL at 10:58

## 2017-05-03 RX ADMIN — ATORVASTATIN CALCIUM 40 MG: 40 TABLET, FILM COATED ORAL at 20:08

## 2017-05-03 RX ADMIN — HYDRALAZINE HYDROCHLORIDE 75 MG: 50 TABLET ORAL at 16:32

## 2017-05-03 RX ADMIN — CLOPIDOGREL 75 MG: 75 TABLET, FILM COATED ORAL at 08:11

## 2017-05-03 ASSESSMENT — PAIN DESCRIPTION - DESCRIPTORS
DESCRIPTORS: ACHING
DESCRIPTORS: ACHING;DISCOMFORT
DESCRIPTORS: ACHING

## 2017-05-03 NOTE — PROGRESS NOTES
"[facility]  Cardiology II Service / Advanced Heart Failure  4/30/2017      Interval History:   - Overnight did have a CI of 2 briefly, otherwise CI has been greater than 2.5  - Captopril started yesterday. uptitrating hydralazine  - this AM, Hb down to 7.2 (all cell lines dropped somewhat), rechecking      Intake/Output Summary (Last 24 hours) at 05/03/17 0854  Last data filed at 05/03/17 0800   Gross per 24 hour   Intake           2831.8 ml   Output              420 ml   Net           2411.8 ml               Pertinent Medications:  I have reviewed this patient's current medications      captopril  6.25 mg Oral Q8H     insulin glargine  25 Units Subcutaneous QAM AC     insulin aspart  1-10 Units Subcutaneous TID AC     insulin aspart  1-7 Units Subcutaneous At Bedtime     hydrALAZINE  75 mg Oral Q6H     sodium chloride (PF)  3 mL Intracatheter Q8H     aspirin  81 mg Oral Daily     sodium chloride (PF)  3 mL Intracatheter Q8H     clopidogrel  75 mg Oral Daily     heparin lock flush  5-10 mL Intracatheter Q24H     QUEtiapine  12.5 mg Oral At Bedtime     pantoprazole  40 mg Per Feeding Tube BID     atorvastatin  40 mg Oral or Feeding Tube Daily at 8 pm     pneumococcal vaccine  0.5 mL Intramuscular Once     sodium chloride (PF)  3 mL Intracatheter Q8H     multivitamins with minerals  15 mL Per Feeding Tube Daily         Examination:  /66  Temp 98.8  F (37.1  C) (Oral)  Resp 18  Ht 1.575 m (5' 2.01\")  Wt 59.4 kg (130 lb 15.3 oz)  SpO2 91%  BMI 23.95 kg/m2  GEN: Intubated and sedated  NECK: can not assess JVP   RESP: +crackles   CV: S1S2S3, no murmurs that i could auscultate  ABD: Soft, nontender to palpation, no HSM, +BS, no guarding  EXT: No peripheral edema, warm/well perfused   SKIN: Normal skin turgor, no rash     Data:  CMP  Recent Labs  Lab 05/03/17  0449 05/02/17  1522 05/02/17  1150 05/02/17  0416 05/01/17  1842  05/01/17  0342    145* 144 144 141  < > 143 "   POTASSIUM 4.0 4.3 3.8 3.9 4.0  < > 3.8   CHLORIDE 109 108 107 107 105  < > 107   CO2 30 31 30 28 28  < > 26   ANIONGAP 5 6 7 8 8  < > 9   * 83 156* 138* 115*  < > 105*   BUN 30 35* 37* 38* 41*  < > 41*   CR 0.95 1.13 1.30* 1.40* 1.48*  < > 1.40*   GFRESTIMATED 84 69 58* 54* 50*  < > 54*   GFRESTBLACK >90African American GFR Calc 83 71 65 61  < > 65   ROB 7.5* 8.0* 8.2* 8.4* 8.3*  < > 7.7*   MAG 2.6* 2.8* 2.7* 2.7* 2.7*  < > 2.8*   PHOS 1.8*  --  2.6 3.6 3.7  --   --    PROTTOTAL 6.2*  --   --  7.2 7.5  --  6.6*   ALBUMIN 2.1*  --   --  2.3* 2.5*  --  2.1*   BILITOTAL 0.4  --   --  0.6 0.4  --  0.6   ALKPHOS 65  --   --  76 86  --  71   AST 35  --   --  36 39  --  31   ALT 20  --   --  22 27  --  21   < > = values in this interval not displayed.  CBC    Recent Labs  Lab 05/03/17  0449 05/02/17  0416 05/01/17  1842 05/01/17  1158  05/01/17  0342   WBC 10.1 11.9* 12.8*  --   --  10.6   RBC 2.39* 2.70* 2.77*  --   --  2.53*   HGB 7.2* 8.3* 8.4* 7.7*  < > 7.4*   HCT 24.8* 27.6* 28.4*  --   --  25.7*   * 102* 103*  --   --  102*   MCH 30.1 30.7 30.3  --   --  29.2   MCHC 29.0* 30.1* 29.6*  --   --  28.8*   RDW 21.2* 21.0* 20.4*  --   --  19.9*    465* 545*  --   --  436   < > = values in this interval not displayed.  INR    Recent Labs  Lab 05/03/17  0449   INR 1.25*     Arterial Blood Gas    Recent Labs  Lab 05/03/17  0828 05/03/17  0450 05/03/17  0001 05/03/17  0000  05/01/17  1842 05/01/17  1542 05/01/17  1404   PH  --   --   --  7.49*  --  7.45 Incorrect specimen typeCORRECTED ON 05/01 AT 1726: PREVIOUSLY REPORTED AS 7.39 7.45   PCO2  --   --   --  36  --  38 Incorrect specimen typeCORRECTED ON 05/01 AT 1726: PREVIOUSLY REPORTED AS 46 37   PO2  --   --   --  132*  --  89 Incorrect specimen typeCORRECTED ON 05/01 AT 1726: PREVIOUSLY REPORTED AS 40 79*   HCO3  --   --   --  27  --  27 Incorrect specimen typeCORRECTED ON 05/01 AT 1726: PREVIOUSLY REPORTED AS 28 26   O2PER 21.0 2L 21 21  < > 3L  Incorrect specimen typeCORRECTED ON 05/01 AT 1726: PREVIOUSLY REPORTED AS 3L  3L 30   < > = values in this interval not displayed.    Imaging Studies:    CTA heaf and neck  Impression:   1. Head CTA demonstrates at least moderate stenosis of both cavernous  internal carotid arteries. Diffuse mild to moderate narrowing of the  basilar artery. Additional areas of mild bilateral M2 segment  narrowing, presumably also related to atherosclerosis.  2. No aneurysm of the major intracranial arteries.   3. Neck CTA demonstrates no carotid artery stenosis. Short segment  mild narrowing of the proximal right V2 segment.  4. No change in probable bilateral cerebral white matter infarcts. No  acute intracranial hemorrhage    Echo 3/27  The visual ejection fraction is estimated at 30-35%.  There is extensive inferior, inferoseptal, and inferolateral wall akinesis.  Basal/mid lateral wall hypokinesis  There is moderate to severe septal hypokinesis.  Septal wall motion abnormality may reflect pacemaker activation.  There is a catheter/pacemaker lead seen in the right ventricle.  The right ventricle is mildly dilated.  Severely decreased right ventricular systolic function  There is no pericardial effusion.  The rhythm was paced.  Compared to the prior study, the LVEF appears somewhat decreased. Septal wall  motion abnormality larger- may reflect, in part, that patient in sinus tach on  prior study, and ventricular paced now. No hemodynamically significant  valvular abnormalities on 2D or color flow imaging     CT chest/abdomen 3/27   IMPRESSION:   1. Small mediastinal hemorrhage, small bilateral pleural effusions  which may be hemorrhagic. Small intraperitoneal hemorrhage. Minimal  pericardial hemorrhage.  2. Minimal pneumoperitoneum in the left paracolic gutter, of unknown  significance. No pneumatosis as clinically questioned.  3. IABP slightly low in position.  4. Bilateral pulmonary dependent consolidations represent  compression  atelectasis, however differentials include aspiration.     Cath 3/26 [facility]  SUMMARY:   --Inferior STEMI w/ late presentation. Culprit dictated by complete  heart block, and cardiogenic shock. Emergent echo in the Cath Lab also  shows significant RV dysfunction.  --Three vessel coronary artery disease with diffuse coronary disease  out to the distal vessels  --Successful POBA of the prox and mid-RCA. Stent was not placed, in  anticipation he may need to be considered for bypass surgery in the  near future  --Insertion of a temporary pacemaker for bradycardia (AVB) and  hypotension  --Insertion of a IABP  --Upper GI bleed consistent with [facility] 3/27     CT head 3/28: normal      Echo 3/29  Interpretation Summary  Limited study. Ischemic CM. Moderately (EF 30-35%) reduced left ventricular  function is present. Traced at 32%.  Posterior wall akinesis is present. Lateral wall akinesis is present. Inferior  wall akinesis is present.  Right ventricular function, chamber size, wall motion, and thickness are  normal.  Dilation of the inferior vena cava is present with abnormal respiratory  variation in diameter.     Compared to prior study, RV fxn is improved.     Echo 3/31  Left Ventricle  The Ejection Fraction is estimated at 50-55%. Inferior,posterior,lateral,basal  septal wall akinesis as reported before. No change.     CORONARY ANGIOGRAM 4/1:   1. Both coronary arteries arise from their respective cusps.  2. Right dominant.  3. LM has mild luminal irregularities.   4. LAD supplies the apex along with the RCA (type 2 LAD) and gives rise to septal perforators and a large and branching D1. There are widely patent stents extending from the proximal to mid LAD. The dLAD has mild to moderate disease to 30% stenosis. The D1 is jailed by the LAD stents, but has EBER 3 flow with disease to 30% stenosis.   5. The small ramus is no longer present.  6. LCX is occluded at the  ostium.   7. RCA gives rise to PL branches and supplies PDA. There are widely patent stents extending from the proximal to mid RCA. The remainder of the mRCA has disease to 50% stenosis and the dRCA has disease to 10% stenosis. The RPDA has a widely patent stent and the remainder of the artery has mild disease to 10% stenosis. The RPLA has small vessels with diffuse disease including a distal branch occlusion. The RPLA supplies some small collateral branches to the dLCx.      COMPLICATIONS:  1. None      SUMMARY:   1. Cardiogenic shock following an inferior STEMI.  2. The LCx re-occlusion is not surprising as the recently revascularized LCx  had poor outflow and the revascularized portion did not supply a large territory (limited to no flow was present distal to the stented segment immediately following stenting). Repeat revascularization of the LCx would result in the same outcome of repeat closure and also risk embolizing thrombus from the LCx into the LAD so no treatment of the occluded LCx was performed.   3. Three vessel coronary artery disease with patent LAD and RCA stents and an occluded LCx.     Echo 4/3  Left ventricular size is normal.  The Ejection Fraction is estimated at 35-40%.  Inferior wall akinesis is present.  Lateral wall akinesis is present.  Posterior wall akinesis is present.  Right ventricular function, chamber size, wall motion, and thickness are  normal.  Moderate mitral insufficiency is present.  Moderate tricuspid insufficiency is present.  Right ventricular systolic pressure is 47mmHg above the right atrial pressure.  The inferior vena cava is normal.     Echo 4/5  Moderately (EF 35-40%) reduced left ventricular function is present. Traced at  40%.  Moderate mitral insufficiency is present.  The cause of the mitral insufficiency appears to be restricted posterior  leaflet from posterior MI. No mitral leaflet pathology seen.  Dilation of the inferior vena cava is present with abnormal  respiratory  variation in diameter.     CT abd without contrast 4/4  IMPRESSION:   1. No evidence of ischemic bowel, obstruction, or intra-abdominal  infection.  2. Bibasilar patchy pulmonary opacities likely represent combination  of aspiration and atelectasis.  3. Small amount of simple free fluid within the lower abdomen.  4. Small left retroperitoneal hematoma along the iliac vasculature  likely postprocedural.   5. Unchanged size of bilateral pleural effusions, which now consist of  simple fluid.    Assessment and Plan  51 year old man presented with cardiogenic shock from inferior STEMI s/p RCA aspiration thrombectomy and POBA on 3/26 at [facility] s/p IABP and initiation of Dopamine, also during procedure, CHB s/p temp pacer, emesis/hematmesis and altered mental status s/p intubation transferred here for consideration of mechanical support on 3/27. Had PCI to RCA, LAD and LCx (on ASA and plavix) started on epinephrine in addition to dopamine, post procedure developed distributive shock picture from infection (aspiration pneumonia? Sputum cx growing staph aureus, on vanco and zosyn) vs post-MI SIRS response. Currently in cardiogenic shock with IABP in situ    Card  # Cardiogenic shock 4/3- from underlying 3v CAD-has had high SVR and low CI, complicated by ischemic MR  # Due to inferior wall STEMI. S/P 7 stents to LAD, circ, RCA (angiogram report pending). Now with IABP, temporary pacemaker after 3rd deg heart block in cath lab at [facility] on 3/26   # Small pericardial hemorrhage   # Remains unclear why we are having a hard time weaning him off of IABP in the setting of EF of 3-35%. Microvascular disease is a possibility.  - IABP (subclavian) in place  - Cortisol normal  - Uptitrate Captopril as tolerated  - Wean down IABP to 1:3  - Recheck Hb      # Neuro  - CVA presenting with left hand weakness and pain  - CTA head/neck shows no hemorrhage but new areas of low attenuation in b/l hemispheres  - Neuro  following.  - MAP goal > 70        Respiratory  -Hypoxic today placed on face mask  - Intubated in the setting of worsening hypoxia on 4/25  - Possible causes: aspiration PNA vs PE vs volume overload   - Diuresing to CVP < 12, already on heparin inf for IABP, and being treated with Vanc and Pip/tazo  - Pulmonary consulted,appreciate recs           # ID  - UA 4/15 with cloudy urine, 50 WBCs, many bacteria, no nitrates. UCx grew E coli - treated with CFTX (sensitive)  - Fever on 4/20 and single spike 100.2 overnight with blood cultures in process (no growth thus far), PICC and SGC removed, started on Vanc and pip/tazo. Treatment completed on 5/1           # Endocrine  T2DM: HA1C 7.5 on admission.  - Insulin gtt per nurising protocol with hypoglycemia precautions.   - Switch to SQ Lantus 4/22      # GI  - Had several maroon BMs since 4/11, Hb overall dropped by a 1.5 grams or so, heparin inf was held and GI consulted  - Colonoscopy 4/17 showed a rectal ulcer but no intervenable lesions  - Will continue to monitor Hb, back on heparin inf  - Protonix 40 PO BID         # Renal/  - Daily Cr  - Avoid nephrotoxins as able  - Plan to keep CVP < 12     -Hypernatremia  - Resolved. Stopped his D5W, only getting FWF's.        FEN: feeding tube, and dysphagia level II  Code: FULL  PPx: PPI 40mg BID, senna PRN  Dispo: pending stability    LINES:  - SC IABP  4/19  - LUE PICC   4/20  - Chavis catheter  4/20  - Lt IJ                            4/29        Plan of care discussed with Dr. Jamison Loving MD  Cardiovascular Disease Fellow  Pager: [phone]

## 2017-05-03 NOTE — PLAN OF CARE
Problem: Intra-Aortic Balloon Pump (Adult)  Goal: Signs and Symptoms of Listed Potential Problems Will be Absent or Manageable (Intra-Aortic Balloon Pump)  Signs and symptoms of listed potential problems will be absent or manageable by discharge/transition of care (reference Intra-Aortic Balloon Pump (Adult) CPG).   Outcome: No Change  Continues with right subclavian IABP and has been at 1:3 with 90% augmentation since 0730 this morning. Refer to flowsheets for hemodynamics and donna trends. Has been up to chair twice today and require assist of 2 for pivot transfer. Complains of pain to coccyx wound, refer to emar for prn analgesics given. Tube feedings at goal and tolerating with no problems. Will continue to monitor and refer to flowsheets for documentation.

## 2017-05-03 NOTE — PLAN OF CARE
Problem: Goal Outcome Summary  Goal: Goal Outcome Summary  1) pt will be hemodynamically stable  2) pt will tolerate weaning of IABP  3) pain will be adequately controlled     Outcome: Therapy, progress toward functional goals as expected  4E PT - Pt s/p 5/1 Mitral clip, but functionally all goals remain appropriate and no major changes to POC, thus no re-eval needed at this time.  Pt min Ax2 for bed up to chair, including brief walking, R knee lizzie as fatigues.  Slides fwd into severe slouch in recliner, despite multiple reposition attempts because pt reports this hurts less for his coccyx wound.  RN aware pt up in recliner and likes to slide down

## 2017-05-03 NOTE — PROGRESS NOTES
Care Coordinator Progress Note     Admission Date/Time:  3/27/2017  Attending MD:  Poornima Hanley, *     Data  Chart reviewed, discussed with interdisciplinary team.   Patient was admitted for:    Cardiogenic shock (H)  Coronary artery disease involving native coronary artery of native heart without angina pectoris  Mitral valve insufficiency, unspecified etiology  Mitral regurgitation.    Assessment  Pt remain in ICU with subclavian IABP.  Pt is being weaned down from IABP.  Pt had Mitral clip done on 5/1.  Visited pt dtr for support.  Pt dtr stated the team have been updating them well about the plan of care.     Plan  Anticipated Discharge Date:  TBD.  Anticipated Discharge Plan:  Rehab when medically stable.  CC will cont to follow plan of care.       Luis Antonio Wodos RN, BSN  4A and 4E/ ICU  Care Coordinator  Phone: [phone]  Pager: [phone]

## 2017-05-03 NOTE — PLAN OF CARE
Problem: Goal Outcome Summary  Goal: Goal Outcome Summary  1) pt will be hemodynamically stable  2) pt will tolerate weaning of IABP  3) pain will be adequately controlled     OT4E: Pt may benefit from TCU at discharge. Pt limited by weakness, fatigue and post surgical precautions. Pt was able to tx to the EOB with Max A and stand with Mod A. Pt stand pivot tx to recliner with Mod A. , MAP 80 and spO2 99% on 2L O2 NC.

## 2017-05-03 NOTE — PROGRESS NOTES
Patient's vitals remained stable throughout the night. Lethargic, A&O 2-3. Continues to be incontinent- soft stools overnight every hour. MAP >65, -125, Sinus tachycardia, CVP 12-15, SvO2 44-65, CI 2-3.6, -1600. IABP 1:2, stable numbers with MAP>65. 2 L Oxyplus SpO2 >98%, Heparin @ 750 units/hr (theurpeutic), Insulin @ 4 units/hr. 0.5 mg Dilaudid given for abdominal pain. Potassium and phosphorus supplemented. Wound barrier applied to pressure ulcer on sacrum. Continue to monitor patient closely and update MDs as needed.

## 2017-05-04 ENCOUNTER — APPOINTMENT (OUTPATIENT)
Dept: OCCUPATIONAL THERAPY | Facility: CLINIC | Age: 52
DRG: 228 | End: 2017-05-04
Attending: INTERNAL MEDICINE
Payer: COMMERCIAL

## 2017-05-04 ENCOUNTER — APPOINTMENT (OUTPATIENT)
Dept: GENERAL RADIOLOGY | Facility: CLINIC | Age: 52
DRG: 228 | End: 2017-05-04
Attending: INTERNAL MEDICINE
Payer: COMMERCIAL

## 2017-05-04 ENCOUNTER — APPOINTMENT (OUTPATIENT)
Dept: PHYSICAL THERAPY | Facility: CLINIC | Age: 52
DRG: 228 | End: 2017-05-04
Attending: INTERNAL MEDICINE
Payer: COMMERCIAL

## 2017-05-04 LAB
ALBUMIN SERPL-MCNC: 2.2 G/DL (ref 3.4–5)
ALP SERPL-CCNC: 68 U/L (ref 40–150)
ALT SERPL W P-5'-P-CCNC: 24 U/L (ref 0–70)
ANION GAP SERPL CALCULATED.3IONS-SCNC: 5 MMOL/L (ref 3–14)
ANION GAP SERPL CALCULATED.3IONS-SCNC: 6 MMOL/L (ref 3–14)
AST SERPL W P-5'-P-CCNC: 35 U/L (ref 0–45)
BASE EXCESS BLDV CALC-SCNC: 2.1 MMOL/L
BASE EXCESS BLDV CALC-SCNC: 3.2 MMOL/L
BASE EXCESS BLDV CALC-SCNC: 6.6 MMOL/L
BASE EXCESS BLDV CALC-SCNC: 7.7 MMOL/L
BASE EXCESS BLDV CALC-SCNC: 7.7 MMOL/L
BASE EXCESS BLDV CALC-SCNC: 8.4 MMOL/L
BASOPHILS # BLD AUTO: 0.1 10E9/L (ref 0–0.2)
BASOPHILS NFR BLD AUTO: 1.1 %
BILIRUB DIRECT SERPL-MCNC: 0.2 MG/DL (ref 0–0.2)
BILIRUB SERPL-MCNC: 0.9 MG/DL (ref 0.2–1.3)
BUN SERPL-MCNC: 24 MG/DL (ref 7–30)
BUN SERPL-MCNC: 26 MG/DL (ref 7–30)
CA-I BLD-MCNC: 4.6 MG/DL (ref 4.4–5.2)
CALCIUM SERPL-MCNC: 7.7 MG/DL (ref 8.5–10.1)
CALCIUM SERPL-MCNC: 8.1 MG/DL (ref 8.5–10.1)
CHLORIDE SERPL-SCNC: 102 MMOL/L (ref 94–109)
CHLORIDE SERPL-SCNC: 105 MMOL/L (ref 94–109)
CO2 SERPL-SCNC: 30 MMOL/L (ref 20–32)
CO2 SERPL-SCNC: 31 MMOL/L (ref 20–32)
CREAT SERPL-MCNC: 0.9 MG/DL (ref 0.66–1.25)
CREAT SERPL-MCNC: 0.92 MG/DL (ref 0.66–1.25)
CRP SERPL-MCNC: 17 MG/L (ref 0–8)
DIFFERENTIAL METHOD BLD: ABNORMAL
EOSINOPHIL # BLD AUTO: 0.2 10E9/L (ref 0–0.7)
EOSINOPHIL NFR BLD AUTO: 2.2 %
ERYTHROCYTE [DISTWIDTH] IN BLOOD BY AUTOMATED COUNT: 21.4 % (ref 10–15)
GFR SERPL CREATININE-BSD FRML MDRD: 87 ML/MIN/1.7M2
GFR SERPL CREATININE-BSD FRML MDRD: 89 ML/MIN/1.7M2
GLUCOSE BLDC GLUCOMTR-MCNC: 161 MG/DL (ref 70–99)
GLUCOSE BLDC GLUCOMTR-MCNC: 163 MG/DL (ref 70–99)
GLUCOSE BLDC GLUCOMTR-MCNC: 174 MG/DL (ref 70–99)
GLUCOSE BLDC GLUCOMTR-MCNC: 181 MG/DL (ref 70–99)
GLUCOSE SERPL-MCNC: 138 MG/DL (ref 70–99)
GLUCOSE SERPL-MCNC: 143 MG/DL (ref 70–99)
HBA1C MFR BLD: NORMAL % (ref 4.3–6)
HCO3 BLDV-SCNC: 28 MMOL/L (ref 21–28)
HCO3 BLDV-SCNC: 28 MMOL/L (ref 21–28)
HCO3 BLDV-SCNC: 32 MMOL/L (ref 21–28)
HCO3 BLDV-SCNC: 33 MMOL/L (ref 21–28)
HCT VFR BLD AUTO: 26 % (ref 40–53)
HGB BLD-MCNC: 7.6 G/DL (ref 13.3–17.7)
IMM GRANULOCYTES # BLD: 0.1 10E9/L (ref 0–0.4)
IMM GRANULOCYTES NFR BLD: 1.4 %
LMWH PPP CHRO-ACNC: 0.28 IU/ML
LYMPHOCYTES # BLD AUTO: 2.3 10E9/L (ref 0.8–5.3)
LYMPHOCYTES NFR BLD AUTO: 27.4 %
MAGNESIUM SERPL-MCNC: 2.3 MG/DL (ref 1.6–2.3)
MAGNESIUM SERPL-MCNC: 2.5 MG/DL (ref 1.6–2.3)
MCH RBC QN AUTO: 30.9 PG (ref 26.5–33)
MCHC RBC AUTO-ENTMCNC: 29.2 G/DL (ref 31.5–36.5)
MCV RBC AUTO: 106 FL (ref 78–100)
MONOCYTES # BLD AUTO: 0.9 10E9/L (ref 0–1.3)
MONOCYTES NFR BLD AUTO: 10.1 %
NEUTROPHILS # BLD AUTO: 4.9 10E9/L (ref 1.6–8.3)
NEUTROPHILS NFR BLD AUTO: 57.8 %
NRBC # BLD AUTO: 0 10*3/UL
NRBC BLD AUTO-RTO: 0 /100
O2/TOTAL GAS SETTING VFR VENT: 21 %
O2/TOTAL GAS SETTING VFR VENT: ABNORMAL %
O2/TOTAL GAS SETTING VFR VENT: NORMAL %
OXYHGB MFR BLDV: 45 %
OXYHGB MFR BLDV: 50 %
OXYHGB MFR BLDV: 58 %
OXYHGB MFR BLDV: 58 %
OXYHGB MFR BLDV: 65 %
OXYHGB MFR BLDV: 68 %
PCO2 BLDV: 42 MM HG (ref 40–50)
PCO2 BLDV: 48 MM HG (ref 40–50)
PCO2 BLDV: 49 MM HG (ref 40–50)
PCO2 BLDV: 51 MM HG (ref 40–50)
PH BLDV: 7.37 PH (ref 7.32–7.43)
PH BLDV: 7.4 PH (ref 7.32–7.43)
PH BLDV: 7.42 PH (ref 7.32–7.43)
PH BLDV: 7.43 PH (ref 7.32–7.43)
PHOSPHATE SERPL-MCNC: 2.5 MG/DL (ref 2.5–4.5)
PLATELET # BLD AUTO: 369 10E9/L (ref 150–450)
PO2 BLDV: 29 MM HG (ref 25–47)
PO2 BLDV: 32 MM HG (ref 25–47)
PO2 BLDV: 37 MM HG (ref 25–47)
PO2 BLDV: 37 MM HG (ref 25–47)
PO2 BLDV: 39 MM HG (ref 25–47)
PO2 BLDV: 40 MM HG (ref 25–47)
POTASSIUM SERPL-SCNC: 4.2 MMOL/L (ref 3.4–5.3)
POTASSIUM SERPL-SCNC: 4.2 MMOL/L (ref 3.4–5.3)
PROT SERPL-MCNC: 6.5 G/DL (ref 6.8–8.8)
RBC # BLD AUTO: 2.46 10E12/L (ref 4.4–5.9)
SODIUM SERPL-SCNC: 138 MMOL/L (ref 133–144)
SODIUM SERPL-SCNC: 140 MMOL/L (ref 133–144)
WBC # BLD AUTO: 8.5 10E9/L (ref 4–11)

## 2017-05-04 PROCEDURE — 85025 COMPLETE CBC W/AUTO DIFF WBC: CPT | Performed by: INTERNAL MEDICINE

## 2017-05-04 PROCEDURE — 80048 BASIC METABOLIC PNL TOTAL CA: CPT | Performed by: INTERNAL MEDICINE

## 2017-05-04 PROCEDURE — 25000132 ZZH RX MED GY IP 250 OP 250 PS 637: Performed by: INTERNAL MEDICINE

## 2017-05-04 PROCEDURE — 40000275 ZZH STATISTIC RCP TIME EA 10 MIN

## 2017-05-04 PROCEDURE — 40000076 ZZH STATISTIC IABP MONITORING

## 2017-05-04 PROCEDURE — 82330 ASSAY OF CALCIUM: CPT | Performed by: INTERNAL MEDICINE

## 2017-05-04 PROCEDURE — 20000004 ZZH R&B ICU UMMC

## 2017-05-04 PROCEDURE — 86140 C-REACTIVE PROTEIN: CPT | Performed by: INTERNAL MEDICINE

## 2017-05-04 PROCEDURE — 97535 SELF CARE MNGMENT TRAINING: CPT | Mod: GO

## 2017-05-04 PROCEDURE — 99211 OFF/OP EST MAY X REQ PHY/QHP: CPT

## 2017-05-04 PROCEDURE — 84100 ASSAY OF PHOSPHORUS: CPT | Performed by: INTERNAL MEDICINE

## 2017-05-04 PROCEDURE — 97110 THERAPEUTIC EXERCISES: CPT | Mod: GP | Performed by: PHYSICAL THERAPIST

## 2017-05-04 PROCEDURE — 25000128 H RX IP 250 OP 636: Performed by: INTERNAL MEDICINE

## 2017-05-04 PROCEDURE — 25000128 H RX IP 250 OP 636: Performed by: STUDENT IN AN ORGANIZED HEALTH CARE EDUCATION/TRAINING PROGRAM

## 2017-05-04 PROCEDURE — 25000132 ZZH RX MED GY IP 250 OP 250 PS 637

## 2017-05-04 PROCEDURE — 25000125 ZZHC RX 250: Performed by: INTERNAL MEDICINE

## 2017-05-04 PROCEDURE — 25000125 ZZHC RX 250

## 2017-05-04 PROCEDURE — 97530 THERAPEUTIC ACTIVITIES: CPT | Mod: GP | Performed by: PHYSICAL THERAPIST

## 2017-05-04 PROCEDURE — 85520 HEPARIN ASSAY: CPT | Performed by: INTERNAL MEDICINE

## 2017-05-04 PROCEDURE — 80076 HEPATIC FUNCTION PANEL: CPT | Performed by: INTERNAL MEDICINE

## 2017-05-04 PROCEDURE — 99233 SBSQ HOSP IP/OBS HIGH 50: CPT | Mod: GC | Performed by: INTERNAL MEDICINE

## 2017-05-04 PROCEDURE — 25000132 ZZH RX MED GY IP 250 OP 250 PS 637: Performed by: STUDENT IN AN ORGANIZED HEALTH CARE EDUCATION/TRAINING PROGRAM

## 2017-05-04 PROCEDURE — 83735 ASSAY OF MAGNESIUM: CPT | Performed by: INTERNAL MEDICINE

## 2017-05-04 PROCEDURE — 71010 XR CHEST PORT 1 VW: CPT

## 2017-05-04 PROCEDURE — 40000193 ZZH STATISTIC PT WARD VISIT: Performed by: PHYSICAL THERAPIST

## 2017-05-04 PROCEDURE — S0171 BUMETANIDE 0.5 MG: HCPCS

## 2017-05-04 PROCEDURE — 40000133 ZZH STATISTIC OT WARD VISIT

## 2017-05-04 PROCEDURE — 82805 BLOOD GASES W/O2 SATURATION: CPT | Performed by: INTERNAL MEDICINE

## 2017-05-04 PROCEDURE — 33968 REMOVE AORTIC ASSIST DEVICE: CPT

## 2017-05-04 PROCEDURE — 25000128 H RX IP 250 OP 636

## 2017-05-04 PROCEDURE — 00000146 ZZHCL STATISTIC GLUCOSE BY METER IP

## 2017-05-04 PROCEDURE — 27210437 ZZH NUTRITION PRODUCT SEMIELEM INTERMED LITER

## 2017-05-04 RX ORDER — BUMETANIDE 0.25 MG/ML
2 INJECTION INTRAMUSCULAR; INTRAVENOUS ONCE
Status: DISCONTINUED | OUTPATIENT
Start: 2017-05-04 | End: 2017-05-04

## 2017-05-04 RX ORDER — BUMETANIDE 0.25 MG/ML
2 INJECTION INTRAMUSCULAR; INTRAVENOUS
Status: DISCONTINUED | OUTPATIENT
Start: 2017-05-04 | End: 2017-05-06

## 2017-05-04 RX ORDER — CAPTOPRIL 12.5 MG/1
12.5 TABLET ORAL EVERY 8 HOURS
Status: DISCONTINUED | OUTPATIENT
Start: 2017-05-04 | End: 2017-05-05

## 2017-05-04 RX ADMIN — BUMETANIDE 2 MG: 0.25 INJECTION INTRAMUSCULAR; INTRAVENOUS at 17:21

## 2017-05-04 RX ADMIN — POTASSIUM PHOSPHATE, MONOBASIC AND POTASSIUM PHOSPHATE, DIBASIC 10 MMOL: 224; 236 INJECTION, SOLUTION INTRAVENOUS at 09:01

## 2017-05-04 RX ADMIN — OXYCODONE HYDROCHLORIDE 10 MG: 5 TABLET ORAL at 12:10

## 2017-05-04 RX ADMIN — CLOPIDOGREL 75 MG: 75 TABLET, FILM COATED ORAL at 09:01

## 2017-05-04 RX ADMIN — INSULIN GLARGINE 25 UNITS: 100 INJECTION, SOLUTION SUBCUTANEOUS at 09:02

## 2017-05-04 RX ADMIN — BUMETANIDE 2 MG: 0.25 INJECTION INTRAMUSCULAR; INTRAVENOUS at 11:00

## 2017-05-04 RX ADMIN — Medication: at 09:01

## 2017-05-04 RX ADMIN — DIGOXIN 125 MCG: 0.12 TABLET ORAL at 09:01

## 2017-05-04 RX ADMIN — HYDRALAZINE HYDROCHLORIDE 75 MG: 50 TABLET ORAL at 05:48

## 2017-05-04 RX ADMIN — Medication 12.5 MG: at 20:44

## 2017-05-04 RX ADMIN — INSULIN ASPART 2 UNITS: 100 INJECTION, SOLUTION INTRAVENOUS; SUBCUTANEOUS at 11:38

## 2017-05-04 RX ADMIN — INSULIN ASPART 1 UNITS: 100 INJECTION, SOLUTION INTRAVENOUS; SUBCUTANEOUS at 09:01

## 2017-05-04 RX ADMIN — Medication 12.5 MG: at 11:38

## 2017-05-04 RX ADMIN — Medication 6.25 MG: at 05:49

## 2017-05-04 RX ADMIN — HYDRALAZINE HYDROCHLORIDE 75 MG: 50 TABLET ORAL at 17:31

## 2017-05-04 RX ADMIN — HEPARIN SODIUM 750 UNITS/HR: 10000 INJECTION, SOLUTION INTRAVENOUS at 08:36

## 2017-05-04 RX ADMIN — HYDROMORPHONE HYDROCHLORIDE 0.5 MG: 1 INJECTION, SOLUTION INTRAMUSCULAR; INTRAVENOUS; SUBCUTANEOUS at 22:19

## 2017-05-04 RX ADMIN — INSULIN ASPART 1 UNITS: 100 INJECTION, SOLUTION INTRAVENOUS; SUBCUTANEOUS at 17:20

## 2017-05-04 RX ADMIN — MULTIVIT AND MINERALS-FERROUS GLUCONATE 9 MG IRON/15 ML ORAL LIQUID 15 ML: at 09:01

## 2017-05-04 RX ADMIN — Medication 12.5 MG: at 20:45

## 2017-05-04 RX ADMIN — PANTOPRAZOLE SODIUM 40 MG: 40 TABLET, DELAYED RELEASE ORAL at 09:01

## 2017-05-04 RX ADMIN — HYDRALAZINE HYDROCHLORIDE 75 MG: 50 TABLET ORAL at 22:22

## 2017-05-04 RX ADMIN — ASPIRIN 81 MG CHEWABLE TABLET 81 MG: 81 TABLET CHEWABLE at 09:01

## 2017-05-04 RX ADMIN — PANTOPRAZOLE SODIUM 40 MG: 40 TABLET, DELAYED RELEASE ORAL at 20:44

## 2017-05-04 RX ADMIN — ATORVASTATIN CALCIUM 40 MG: 40 TABLET, FILM COATED ORAL at 20:44

## 2017-05-04 RX ADMIN — HYDRALAZINE HYDROCHLORIDE 75 MG: 50 TABLET ORAL at 11:38

## 2017-05-04 NOTE — PLAN OF CARE
Problem: Goal Outcome Summary  Goal: Goal Outcome Summary  1) pt will be hemodynamically stable  2) pt will tolerate weaning of IABP  3) pain will be adequately controlled     OT 4E: Pt appeared more confused during today's session. Pt impulsive and demonstrated poor safety/judgement. Pt continues to present with L sided weakness limiting independence with ADLs and functional transfers. Pt supine > EOB with mod A. Pt dangled EOB with CGA-min A. Pt completed sit > stand with min A x2. Pt completed pivot transfer with max A x2 and cues for LLE foot placement throughout. Pt significantly limited by pain due to sacral wound and sliding out of chair - assist of sling to keep pt seated safely in chair. Pt on 2L NC,  O2 >93%.      Rec TCU when medically stable to address cognitive impairments, L sided weakness, and decreased activity tolerance for ADLs.

## 2017-05-04 NOTE — PROGRESS NOTES
"   05/04/17 1200   Visit Information   Visit Made By Staff    Type of Visit Follow-up   SPIRITUAL HEALTH SERVICES   Spiritual Assessment Progress Note   [facility] 4E   REFERRAL SOURCE: LOS   On this visit, attempted to see pt/family post surgery.  Luke was asleep and no family was present. Left a \"Sorry I Missed You\" note with good wishes.   PLAN: Will come back next week to see Luke.    Rev. Houston-O Hollis-Natasha  Staff   Spiritual Health Services  Pager: [phone]  Office: [phone]      "

## 2017-05-04 NOTE — PLAN OF CARE
Problem: Goal Outcome Summary  Goal: Goal Outcome Summary  1) pt will be hemodynamically stable  2) pt will tolerate weaning of IABP  3) pain will be adequately controlled     Outcome: Improving  A & O x 2. Follows commands. ST with rates 110-120s. Occasional PVCs. MAPs 65-90. IABP 1:1 from 3007-9268, 1:2 8788-7739, 1:3 from 0500 to time. SVO2 50-60%. CO 4-6 CI 2-3. CVP 13,12,16. 2L via NC for 02 saturation above 92%. Lungs clear and diminished bilat. TF at 50 with q4hr 30cc flushes. UOP 30-100cc/hr. Frequent education on sliding down in bed. Patient does not appear to understand education. Frequent repositioning provided. Skin breakdown on coccyx covered with foam drsg. Morphine cream utilized PRN. Heparin therapeutic this AM.  Plan for possible IABP removal today.

## 2017-05-04 NOTE — PROGRESS NOTES
[facility]  WO Nurse Inpatient Adult Pressure INJURY (PI) Wound Assessment      Follow up assessment of PU(s) on pt's:   Coccyx  Data:   Patient History:    per MD note(s): 51 year old man presented with cardiogenic shock from inferior STEMI s/p RCA aspiration thrombectomy and POBA on 3/26 at [facility] s/p IABP and initiation of Dopamine, also during procedure, CHB s/p temp pacer, emesis/hematmesis and altered mental status s/p intubation transferred here for consideration of mechanical support on 3/27. Had PCI to RCA, LAD and LCx (on ASA and plavix) started on epinephrine in addition to dopamine, post procedure developed distributive shock picture from infection (aspiration pneumonia? Sputum cx growing staph aureus, on vanco and zosyn) vs post-MI SIRS response. Currently in cardiogenic shock with IABP in situ       Current mattress:  Isolibrium  Current pressure relieving devices: Heel lift boots and Pillows    Moisture Management:  Incontinence Protocol, Rectal Tube and Urinary Catheter    Catheter secured? Yes    Current Diet / Nutrition:        Active Diet Order   Active Diet Order      Full Liquid Diet    John Assessment and sub scores:  John Score Av.1 Min: 12 Max: 18           Recent Labs   Lab Test 17  0400   17  0345   17  0425   ALBUMIN 1.8* < > 2.1* < > 2.4*   HGB 7.9* < > 7.9* < > 9.5*   INR --  --  1.25* < > 1.19*   WBC 14.9* < > 9.7 < > 16.7*   A1C --  --  --  --  7.1*   CRP --  --  --  --  18.0*   < > = values in this interval not displayed.      Pressure Injury Assessment (location #1): Coccyx   Wound History: Stage 2 pressure injury situated more toward right buttock    Photo, coccyx 17       Wound Base: white dermis with red follicles no surrounding erythema , moist    Specific Dimensions (length x width x depth, in cm) : 1.5cm x 1.5cm x < 0.1 cm    Palpation of the wound bed: normal    Slough appearance: none       Periwound Skin: intact,     Color:  normal and consistent with surrounding tissue    Temperature normal     Drainage: Amount: scant, Color: serous     Odor: none    Pain: patient intubated    Family education: Family requesting dressing be used because wound is painful for pt.  Morphine gel in use around edges of wound.  Explained why dressings have been less than effective in the past.  Family still requests dressing to cushion area.       Intervention:     Patient's chart evaluated.     John Interventions: Current John Interventions and Care Plan reviewed and updated, appropriate at this time.    Wound was assessed.    Wound Care: was done: Visual inspection, Cleansing with NS solution, Application of  Paste    Orders Reviewed    Supplies Ordered Traid Paste    Discussed plan of care with Nurse      Assessment:     Pressure Injury (PI) located on Coccyx: II    Status: wound no significant change, Symptomatic          Plan:     Nursing to notify the Provider(s) and re-consult the WOC Nurse if wound(s) deteriorate(s).    Plan of care for wound located on Coccyx: Cleanse with microklenz and pat dry.  Pain edges of wound with cavilon, apply mepilex and change every three days or as needed.       WOC Nurse will return: Mondays and Thursday  Face to face time: 15 minutes

## 2017-05-04 NOTE — PROVIDER NOTIFICATION
The IABP was discontinued at 16:00. The balloon was in good condition.    5/4/2017  Juan Pisano RRT

## 2017-05-04 NOTE — PLAN OF CARE
Problem: Goal Outcome Summary  Goal: Goal Outcome Summary  1) pt will be hemodynamically stable  2) pt will tolerate weaning of IABP  3) pain will be adequately controlled     Outcome: No Change  D: Subclavian IABP, POD #3 Mitral valve repair  I/A: SVo2 45 at 0800. IABP in place, tolerating 1:3 augmentation. HR ST 100s-120s. SVo2 58 at 1200. Cards attending and fellow at bedside, removed Subclavian IABP around 1600. Cuff BP 90s/70s-80s, MAPs 80s. MD notified, continuing to monitor. Heparin gtt continued at 750 units/hr for therapeutic level this AM. Pt restless, gave Oxycodone x1 for c/o pain on coccyx area where pressure ulcer remains. WOC nurse here today to assess, new wound care orders placed. Pt reported decrease in pain with Morphine cream to pressure ulcer area. Scheduled Bumex BID ordered for diuresis, CVPs 16-20. UO increased and averaged 100mL/hr after doses.  Pt family at bedside throughout day.   P: Continue POC, update family and MD with changes.

## 2017-05-04 NOTE — PLAN OF CARE
Problem: Goal Outcome Summary  Goal: Goal Outcome Summary  1) pt will be hemodynamically stable  2) pt will tolerate weaning of IABP  3) pain will be adequately controlled     PT 4E: patient oriented to self only at beginning of session.  Transferred to EOB with assist x 2.  Stood x 3, 30-45 seconds each trial, with assist x 2.  Mobility limited today by fatigue, dizziness and deconditioning.      Anticipate need for rehab stay when stable.

## 2017-05-04 NOTE — PROGRESS NOTES
"[facility]  Cardiology II Service / Advanced Heart Failure  4/30/2017      Interval History:   - CI stable, on IABP 1:3 yesterday, on 1:1 overnight        Intake/Output Summary (Last 24 hours) at 05/04/17 1418  Last data filed at 05/04/17 1400   Gross per 24 hour   Intake             1752 ml   Output             1660 ml   Net               92 ml                 Pertinent Medications:  I have reviewed this patient's current medications      captopril  12.5 mg Oral Q8H     bumetanide  2 mg Intravenous BID     insulin glargine  25 Units Subcutaneous QAM AC     insulin aspart  1-10 Units Subcutaneous TID AC     insulin aspart  1-7 Units Subcutaneous At Bedtime     digoxin  125 mcg Oral Daily     hydrALAZINE  75 mg Oral Q6H     sodium chloride (PF)  3 mL Intracatheter Q8H     aspirin  81 mg Oral Daily     sodium chloride (PF)  3 mL Intracatheter Q8H     clopidogrel  75 mg Oral Daily     heparin lock flush  5-10 mL Intracatheter Q24H     QUEtiapine  12.5 mg Oral At Bedtime     pantoprazole  40 mg Per Feeding Tube BID     atorvastatin  40 mg Oral or Feeding Tube Daily at 8 pm     pneumococcal vaccine  0.5 mL Intramuscular Once     sodium chloride (PF)  3 mL Intracatheter Q8H     multivitamins with minerals  15 mL Per Feeding Tube Daily         Examination:  BP 94/79  Temp 97.8  F (36.6  C) (Oral)  Resp 13  Ht 1.575 m (5' 2.01\")  Wt 59.4 kg (130 lb 15.3 oz)  SpO2 99%  BMI 23.95 kg/m2  GEN: Intubated and sedated  NECK: can not assess JVP   RESP: +crackles   CV: S1S2S3, no murmurs that i could auscultate  ABD: Soft, nontender to palpation, no HSM, +BS, no guarding  EXT: No peripheral edema, warm/well perfused   SKIN: Normal skin turgor, no rash     Data:  CMP    Recent Labs  Lab 05/04/17  0359 05/03/17  1605 05/03/17  0449 05/02/17  1522 05/02/17  1150 05/02/17  0416 05/01/17  1842    142 144 145* 144 144 141   POTASSIUM 4.2 4.2 4.0 4.3 3.8 3.9 4.0   CHLORIDE 105 103 109 108 " 107 107 105   CO2 31 34* 30 31 30 28 28   ANIONGAP 5 6 5 6 7 8 8   * 119* 112* 83 156* 138* 115*   BUN 26 29 30 35* 37* 38* 41*   CR 0.90 0.95 0.95 1.13 1.30* 1.40* 1.48*   GFRESTIMATED 89 84 84 69 58* 54* 50*   GFRESTBLACK >90African American GFR Calc >90African American GFR Calc >90African American GFR Calc 83 71 65 61   ROB 8.1* 8.2* 7.5* 8.0* 8.2* 8.4* 8.3*   MAG 2.5* 2.5* 2.6* 2.8* 2.7* 2.7* 2.7*   PHOS 2.5  --  1.8*  --  2.6 3.6 3.7   PROTTOTAL 6.5*  --  6.2*  --   --  7.2 7.5   ALBUMIN 2.2*  --  2.1*  --   --  2.3* 2.5*   BILITOTAL 0.9  --  0.4  --   --  0.6 0.4   ALKPHOS 68  --  65  --   --  76 86   AST 35  --  35  --   --  36 39   ALT 24  --  20  --   --  22 27     CBC    Recent Labs  Lab 05/04/17 0359 05/03/17  0920 05/03/17  0449 05/02/17  0416   WBC 8.5 9.7 10.1 11.9*   RBC 2.46* 2.46* 2.39* 2.70*   HGB 7.6* 7.5* 7.2* 8.3*   HCT 26.0* 25.8* 24.8* 27.6*   * 105* 104* 102*   MCH 30.9 30.5 30.1 30.7   MCHC 29.2* 29.1* 29.0* 30.1*   RDW 21.4* 21.1* 21.2* 21.0*    414 399 465*     INR    Recent Labs  Lab 05/03/17  0449   INR 1.25*     Arterial Blood Gas    Recent Labs  Lab 05/04/17  1249 05/04/17  0834 05/04/17  0359 05/03/17  2354  05/03/17  0000  05/01/17  1842 05/01/17  1542 05/01/17  1404   PH  --   --   --   --   --  7.49*  --  7.45 Incorrect specimen typeCORRECTED ON 05/01 AT 1726: PREVIOUSLY REPORTED AS 7.39 7.45   PCO2  --   --   --   --   --  36  --  38 Incorrect specimen typeCORRECTED ON 05/01 AT 1726: PREVIOUSLY REPORTED AS 46 37   PO2  --   --   --   --   --  132*  --  89 Incorrect specimen typeCORRECTED ON 05/01 AT 1726: PREVIOUSLY REPORTED AS 40 79*   HCO3  --   --   --   --   --  27  --  27 Incorrect specimen typeCORRECTED ON 05/01 AT 1726: PREVIOUSLY REPORTED AS 28 26   O2PER 2L 21 2L 2L  < > 21  < > 3L Incorrect specimen typeCORRECTED ON 05/01 AT 1726: PREVIOUSLY REPORTED AS 3L  3L 30   < > = values in this interval not displayed.    Imaging Studies:    CTA heaf and  neck  Impression:   1. Head CTA demonstrates at least moderate stenosis of both cavernous  internal carotid arteries. Diffuse mild to moderate narrowing of the  basilar artery. Additional areas of mild bilateral M2 segment  narrowing, presumably also related to atherosclerosis.  2. No aneurysm of the major intracranial arteries.   3. Neck CTA demonstrates no carotid artery stenosis. Short segment  mild narrowing of the proximal right V2 segment.  4. No change in probable bilateral cerebral white matter infarcts. No  acute intracranial hemorrhage    Echo 3/27  The visual ejection fraction is estimated at 30-35%.  There is extensive inferior, inferoseptal, and inferolateral wall akinesis.  Basal/mid lateral wall hypokinesis  There is moderate to severe septal hypokinesis.  Septal wall motion abnormality may reflect pacemaker activation.  There is a catheter/pacemaker lead seen in the right ventricle.  The right ventricle is mildly dilated.  Severely decreased right ventricular systolic function  There is no pericardial effusion.  The rhythm was paced.  Compared to the prior study, the LVEF appears somewhat decreased. Septal wall  motion abnormality larger- may reflect, in part, that patient in sinus tach on  prior study, and ventricular paced now. No hemodynamically significant  valvular abnormalities on 2D or color flow imaging     CT chest/abdomen 3/27   IMPRESSION:   1. Small mediastinal hemorrhage, small bilateral pleural effusions  which may be hemorrhagic. Small intraperitoneal hemorrhage. Minimal  pericardial hemorrhage.  2. Minimal pneumoperitoneum in the left paracolic gutter, of unknown  significance. No pneumatosis as clinically questioned.  3. IABP slightly low in position.  4. Bilateral pulmonary dependent consolidations represent compression  atelectasis, however differentials include aspiration.     Cath 3/26 [facility]  SUMMARY:   --Inferior STEMI w/ late presentation. Culprit dictated by  complete  heart block, and cardiogenic shock. Emergent echo in the Cath Lab also  shows significant RV dysfunction.  --Three vessel coronary artery disease with diffuse coronary disease  out to the distal vessels  --Successful POBA of the prox and mid-RCA. Stent was not placed, in  anticipation he may need to be considered for bypass surgery in the  near future  --Insertion of a temporary pacemaker for bradycardia (AVB) and  hypotension  --Insertion of a IABP  --Upper GI bleed consistent with Kaylin-Bon[place]     Cath [place] 3/27     CT head 3/28: normal      Echo 3/29  Interpretation Summary  Limited study. Ischemic CM. Moderately (EF 30-35%) reduced left ventricular  function is present. Traced at 32%.  Posterior wall akinesis is present. Lateral wall akinesis is present. Inferior  wall akinesis is present.  Right ventricular function, chamber size, wall motion, and thickness are  normal.  Dilation of the inferior vena cava is present with abnormal respiratory  variation in diameter.     Compared to prior study, RV fxn is improved.     Echo 3/31  Left Ventricle  The Ejection Fraction is estimated at 50-55%. Inferior,posterior,lateral,basal  septal wall akinesis as reported before. No change.     CORONARY ANGIOGRAM 4/1:   1. Both coronary arteries arise from their respective cusps.  2. Right dominant.  3. LM has mild luminal irregularities.   4. LAD supplies the apex along with the RCA (type 2 LAD) and gives rise to septal perforators and a large and branching D1. There are widely patent stents extending from the proximal to mid LAD. The dLAD has mild to moderate disease to 30% stenosis. The D1 is jailed by the LAD stents, but has EBER 3 flow with disease to 30% stenosis.   5. The small ramus is no longer present.  6. LCX is occluded at the ostium.   7. RCA gives rise to PL branches and supplies PDA. There are widely patent stents extending from the proximal to mid RCA. The remainder of the mRCA has disease to 50%  stenosis and the dRCA has disease to 10% stenosis. The RPDA has a widely patent stent and the remainder of the artery has mild disease to 10% stenosis. The RPLA has small vessels with diffuse disease including a distal branch occlusion. The RPLA supplies some small collateral branches to the dLCx.      COMPLICATIONS:  1. None      SUMMARY:   1. Cardiogenic shock following an inferior STEMI.  2. The LCx re-occlusion is not surprising as the recently revascularized LCx  had poor outflow and the revascularized portion did not supply a large territory (limited to no flow was present distal to the stented segment immediately following stenting). Repeat revascularization of the LCx would result in the same outcome of repeat closure and also risk embolizing thrombus from the LCx into the LAD so no treatment of the occluded LCx was performed.   3. Three vessel coronary artery disease with patent LAD and RCA stents and an occluded LCx.     Echo 4/3  Left ventricular size is normal.  The Ejection Fraction is estimated at 35-40%.  Inferior wall akinesis is present.  Lateral wall akinesis is present.  Posterior wall akinesis is present.  Right ventricular function, chamber size, wall motion, and thickness are  normal.  Moderate mitral insufficiency is present.  Moderate tricuspid insufficiency is present.  Right ventricular systolic pressure is 47mmHg above the right atrial pressure.  The inferior vena cava is normal.     Echo 4/5  Moderately (EF 35-40%) reduced left ventricular function is present. Traced at  40%.  Moderate mitral insufficiency is present.  The cause of the mitral insufficiency appears to be restricted posterior  leaflet from posterior MI. No mitral leaflet pathology seen.  Dilation of the inferior vena cava is present with abnormal respiratory  variation in diameter.     CT abd without contrast 4/4  IMPRESSION:   1. No evidence of ischemic bowel, obstruction, or intra-abdominal  infection.  2. Bibasilar  patchy pulmonary opacities likely represent combination  of aspiration and atelectasis.  3. Small amount of simple free fluid within the lower abdomen.  4. Small left retroperitoneal hematoma along the iliac vasculature  likely postprocedural.   5. Unchanged size of bilateral pleural effusions, which now consist of  simple fluid.    Assessment and Plan  51 year old man presented with cardiogenic shock from inferior STEMI s/p RCA aspiration thrombectomy and POBA on 3/26 at [facility] s/p IABP and initiation of Dopamine, also during procedure, CHB s/p temp pacer, emesis/hematmesis and altered mental status s/p intubation transferred here for consideration of mechanical support on 3/27. Had PCI to RCA, LAD and LCx (on ASA and plavix) started on epinephrine in addition to dopamine, post procedure developed distributive shock picture from infection (aspiration pneumonia? Sputum cx growing staph aureus, on vanco and zosyn) vs post-MI SIRS response. Currently in cardiogenic shock with IABP in situ    Card  # Cardiogenic shock 4/3- from underlying 3v CAD-has had high SVR and low CI, complicated by ischemic MR  # Due to inferior wall STEMI. S/P 7 stents to LAD, circ, RCA (angiogram report pending). Now with IABP, temporary pacemaker after 3rd deg heart block in cath lab at [facility] on 3/26   # Small pericardial hemorrhage   # Remains unclear why we are having a hard time weaning him off of IABP in the setting of EF of 3-35%. Microvascular disease is a possibility.  - IABP (subclavian) in place  - Cortisol normal  - Uptitrate Captopril as tolerated  - Remove IABP      # Neuro  - CVA presenting with left hand weakness and pain  - CTA head/neck shows no hemorrhage but new areas of low attenuation in b/l hemispheres  - Neuro following.  - MAP goal > 70        Respiratory  -Hypoxic today placed on face mask  - Intubated in the setting of worsening hypoxia on 4/25  - Possible causes: aspiration PNA vs PE vs volume overload   -  Diuresing to CVP < 12, already on heparin inf for IABP, and being treated with Vanc and Pip/tazo  - Pulmonary consulted,appreciate recs           # ID  - UA 4/15 with cloudy urine, 50 WBCs, many bacteria, no nitrates. UCx grew E coli - treated with CFTX (sensitive)  - Fever on 4/20 and single spike 100.2 overnight with blood cultures in process (no growth thus far), PICC and SGC removed, started on Vanc and pip/tazo. Treatment completed on 5/1           # Endocrine  T2DM: HA1C 7.5 on admission.  - Insulin gtt per nurising protocol with hypoglycemia precautions.   - Switch to SQ Lantus 4/22      # GI  - Had several maroon BMs since 4/11, Hb overall dropped by a 1.5 grams or so, heparin inf was held and GI consulted  - Colonoscopy 4/17 showed a rectal ulcer but no intervenable lesions  - Will continue to monitor Hb, back on heparin inf  - Protonix 40 PO BID         # Renal/  - Daily Cr  - Avoid nephrotoxins as able  - Plan to keep CVP < 12     -Hypernatremia  - Resolved. Stopped his D5W, only getting FWF's.        FEN: feeding tube, and dysphagia level II  Code: FULL  PPx: PPI 40mg BID, senna PRN  Dispo: pending stability    LINES:  - SC IABP  4/19  - LUE PICC   4/20  - [place] catheter  4/20  - Lt IJ                            4/29        Plan of care discussed with Dr. Jamison Loving MD  Cardiovascular Disease Fellow  Pager: [phone]

## 2017-05-05 ENCOUNTER — APPOINTMENT (OUTPATIENT)
Dept: OCCUPATIONAL THERAPY | Facility: CLINIC | Age: 52
DRG: 228 | End: 2017-05-05
Attending: INTERNAL MEDICINE
Payer: COMMERCIAL

## 2017-05-05 ENCOUNTER — APPOINTMENT (OUTPATIENT)
Dept: SPEECH THERAPY | Facility: CLINIC | Age: 52
DRG: 228 | End: 2017-05-05
Attending: STUDENT IN AN ORGANIZED HEALTH CARE EDUCATION/TRAINING PROGRAM
Payer: COMMERCIAL

## 2017-05-05 ENCOUNTER — APPOINTMENT (OUTPATIENT)
Dept: GENERAL RADIOLOGY | Facility: CLINIC | Age: 52
DRG: 228 | End: 2017-05-05
Attending: INTERNAL MEDICINE
Payer: COMMERCIAL

## 2017-05-05 ENCOUNTER — APPOINTMENT (OUTPATIENT)
Dept: PHYSICAL THERAPY | Facility: CLINIC | Age: 52
DRG: 228 | End: 2017-05-05
Attending: INTERNAL MEDICINE
Payer: COMMERCIAL

## 2017-05-05 LAB
ALBUMIN SERPL-MCNC: 2.2 G/DL (ref 3.4–5)
ALP SERPL-CCNC: 68 U/L (ref 40–150)
ALT SERPL W P-5'-P-CCNC: 32 U/L (ref 0–70)
ANION GAP SERPL CALCULATED.3IONS-SCNC: 4 MMOL/L (ref 3–14)
ANION GAP SERPL CALCULATED.3IONS-SCNC: 8 MMOL/L (ref 3–14)
AST SERPL W P-5'-P-CCNC: 36 U/L (ref 0–45)
BASE EXCESS BLDV CALC-SCNC: 6.3 MMOL/L
BASE EXCESS BLDV CALC-SCNC: 6.6 MMOL/L
BASE EXCESS BLDV CALC-SCNC: 6.9 MMOL/L
BASE EXCESS BLDV CALC-SCNC: 7.2 MMOL/L
BASE EXCESS BLDV CALC-SCNC: 7.3 MMOL/L
BASE EXCESS BLDV CALC-SCNC: 7.7 MMOL/L
BASOPHILS # BLD AUTO: 0.1 10E9/L (ref 0–0.2)
BASOPHILS NFR BLD AUTO: 1 %
BILIRUB DIRECT SERPL-MCNC: 0.1 MG/DL (ref 0–0.2)
BILIRUB SERPL-MCNC: 0.3 MG/DL (ref 0.2–1.3)
BUN SERPL-MCNC: 22 MG/DL (ref 7–30)
BUN SERPL-MCNC: 24 MG/DL (ref 7–30)
CA-I BLD-MCNC: 4.6 MG/DL (ref 4.4–5.2)
CALCIUM SERPL-MCNC: 7.6 MG/DL (ref 8.5–10.1)
CALCIUM SERPL-MCNC: 7.9 MG/DL (ref 8.5–10.1)
CHLORIDE SERPL-SCNC: 100 MMOL/L (ref 94–109)
CHLORIDE SERPL-SCNC: 99 MMOL/L (ref 94–109)
CO2 SERPL-SCNC: 30 MMOL/L (ref 20–32)
CO2 SERPL-SCNC: 33 MMOL/L (ref 20–32)
CREAT SERPL-MCNC: 0.92 MG/DL (ref 0.66–1.25)
CREAT SERPL-MCNC: 0.96 MG/DL (ref 0.66–1.25)
DIFFERENTIAL METHOD BLD: ABNORMAL
EOSINOPHIL # BLD AUTO: 0.2 10E9/L (ref 0–0.7)
EOSINOPHIL NFR BLD AUTO: 2.4 %
ERYTHROCYTE [DISTWIDTH] IN BLOOD BY AUTOMATED COUNT: 20 % (ref 10–15)
GFR SERPL CREATININE-BSD FRML MDRD: 83 ML/MIN/1.7M2
GFR SERPL CREATININE-BSD FRML MDRD: 87 ML/MIN/1.7M2
GLUCOSE BLDC GLUCOMTR-MCNC: 159 MG/DL (ref 70–99)
GLUCOSE BLDC GLUCOMTR-MCNC: 197 MG/DL (ref 70–99)
GLUCOSE BLDC GLUCOMTR-MCNC: 198 MG/DL (ref 70–99)
GLUCOSE BLDC GLUCOMTR-MCNC: 202 MG/DL (ref 70–99)
GLUCOSE SERPL-MCNC: 138 MG/DL (ref 70–99)
GLUCOSE SERPL-MCNC: 154 MG/DL (ref 70–99)
HCO3 BLDV-SCNC: 31 MMOL/L (ref 21–28)
HCO3 BLDV-SCNC: 31 MMOL/L (ref 21–28)
HCO3 BLDV-SCNC: 32 MMOL/L (ref 21–28)
HCT VFR BLD AUTO: 24.9 % (ref 40–53)
HGB BLD-MCNC: 7.4 G/DL (ref 13.3–17.7)
IMM GRANULOCYTES # BLD: 0.1 10E9/L (ref 0–0.4)
IMM GRANULOCYTES NFR BLD: 0.9 %
LMWH PPP CHRO-ACNC: 0.19 IU/ML
LYMPHOCYTES # BLD AUTO: 2.1 10E9/L (ref 0.8–5.3)
LYMPHOCYTES NFR BLD AUTO: 26.6 %
MAGNESIUM SERPL-MCNC: 2.1 MG/DL (ref 1.6–2.3)
MAGNESIUM SERPL-MCNC: 2.3 MG/DL (ref 1.6–2.3)
MCH RBC QN AUTO: 30.2 PG (ref 26.5–33)
MCHC RBC AUTO-ENTMCNC: 29.7 G/DL (ref 31.5–36.5)
MCV RBC AUTO: 102 FL (ref 78–100)
MONOCYTES # BLD AUTO: 0.9 10E9/L (ref 0–1.3)
MONOCYTES NFR BLD AUTO: 10.9 %
NEUTROPHILS # BLD AUTO: 4.7 10E9/L (ref 1.6–8.3)
NEUTROPHILS NFR BLD AUTO: 58.2 %
NRBC # BLD AUTO: 0 10*3/UL
NRBC BLD AUTO-RTO: 0 /100
O2/TOTAL GAS SETTING VFR VENT: ABNORMAL %
OXYHGB MFR BLDV: 47 %
OXYHGB MFR BLDV: 53 %
OXYHGB MFR BLDV: 58 %
OXYHGB MFR BLDV: 58 %
OXYHGB MFR BLDV: 59 %
OXYHGB MFR BLDV: 63 %
PCO2 BLDV: 46 MM HG (ref 40–50)
PCO2 BLDV: 47 MM HG (ref 40–50)
PCO2 BLDV: 48 MM HG (ref 40–50)
PCO2 BLDV: 49 MM HG (ref 40–50)
PH BLDV: 7.43 PH (ref 7.32–7.43)
PH BLDV: 7.44 PH (ref 7.32–7.43)
PH BLDV: 7.45 PH (ref 7.32–7.43)
PHOSPHATE SERPL-MCNC: 2.7 MG/DL (ref 2.5–4.5)
PLATELET # BLD AUTO: 396 10E9/L (ref 150–450)
PO2 BLDV: 31 MM HG (ref 25–47)
PO2 BLDV: 33 MM HG (ref 25–47)
PO2 BLDV: 35 MM HG (ref 25–47)
PO2 BLDV: 35 MM HG (ref 25–47)
PO2 BLDV: 36 MM HG (ref 25–47)
PO2 BLDV: 38 MM HG (ref 25–47)
POTASSIUM SERPL-SCNC: 4 MMOL/L (ref 3.4–5.3)
POTASSIUM SERPL-SCNC: 4.2 MMOL/L (ref 3.4–5.3)
PROT SERPL-MCNC: 6.6 G/DL (ref 6.8–8.8)
RBC # BLD AUTO: 2.45 10E12/L (ref 4.4–5.9)
SODIUM SERPL-SCNC: 136 MMOL/L (ref 133–144)
SODIUM SERPL-SCNC: 139 MMOL/L (ref 133–144)
WBC # BLD AUTO: 8 10E9/L (ref 4–11)

## 2017-05-05 PROCEDURE — 40000133 ZZH STATISTIC OT WARD VISIT

## 2017-05-05 PROCEDURE — 25000132 ZZH RX MED GY IP 250 OP 250 PS 637: Performed by: INTERNAL MEDICINE

## 2017-05-05 PROCEDURE — 25000125 ZZHC RX 250: Performed by: STUDENT IN AN ORGANIZED HEALTH CARE EDUCATION/TRAINING PROGRAM

## 2017-05-05 PROCEDURE — 82330 ASSAY OF CALCIUM: CPT | Performed by: INTERNAL MEDICINE

## 2017-05-05 PROCEDURE — 97112 NEUROMUSCULAR REEDUCATION: CPT | Mod: GO

## 2017-05-05 PROCEDURE — 82805 BLOOD GASES W/O2 SATURATION: CPT | Performed by: INTERNAL MEDICINE

## 2017-05-05 PROCEDURE — 97530 THERAPEUTIC ACTIVITIES: CPT | Mod: GP

## 2017-05-05 PROCEDURE — 83735 ASSAY OF MAGNESIUM: CPT | Performed by: INTERNAL MEDICINE

## 2017-05-05 PROCEDURE — 40000196 ZZH STATISTIC RAPCV CVP MONITORING

## 2017-05-05 PROCEDURE — 85520 HEPARIN ASSAY: CPT | Performed by: INTERNAL MEDICINE

## 2017-05-05 PROCEDURE — 25000132 ZZH RX MED GY IP 250 OP 250 PS 637

## 2017-05-05 PROCEDURE — 27210437 ZZH NUTRITION PRODUCT SEMIELEM INTERMED LITER

## 2017-05-05 PROCEDURE — 40000275 ZZH STATISTIC RCP TIME EA 10 MIN

## 2017-05-05 PROCEDURE — 80076 HEPATIC FUNCTION PANEL: CPT | Performed by: INTERNAL MEDICINE

## 2017-05-05 PROCEDURE — 97535 SELF CARE MNGMENT TRAINING: CPT | Mod: GO

## 2017-05-05 PROCEDURE — 25000132 ZZH RX MED GY IP 250 OP 250 PS 637: Performed by: STUDENT IN AN ORGANIZED HEALTH CARE EDUCATION/TRAINING PROGRAM

## 2017-05-05 PROCEDURE — 00000146 ZZHCL STATISTIC GLUCOSE BY METER IP

## 2017-05-05 PROCEDURE — 20000004 ZZH R&B ICU UMMC

## 2017-05-05 PROCEDURE — S0171 BUMETANIDE 0.5 MG: HCPCS

## 2017-05-05 PROCEDURE — 80048 BASIC METABOLIC PNL TOTAL CA: CPT | Performed by: INTERNAL MEDICINE

## 2017-05-05 PROCEDURE — 40000193 ZZH STATISTIC PT WARD VISIT

## 2017-05-05 PROCEDURE — 92610 EVALUATE SWALLOWING FUNCTION: CPT | Mod: GN | Performed by: SPEECH-LANGUAGE PATHOLOGIST

## 2017-05-05 PROCEDURE — 84100 ASSAY OF PHOSPHORUS: CPT | Performed by: INTERNAL MEDICINE

## 2017-05-05 PROCEDURE — T1013 SIGN LANG/ORAL INTERPRETER: HCPCS | Mod: U3

## 2017-05-05 PROCEDURE — 25000125 ZZHC RX 250

## 2017-05-05 PROCEDURE — 85025 COMPLETE CBC W/AUTO DIFF WBC: CPT | Performed by: INTERNAL MEDICINE

## 2017-05-05 PROCEDURE — 40000225 ZZH STATISTIC SLP WARD VISIT: Performed by: SPEECH-LANGUAGE PATHOLOGIST

## 2017-05-05 PROCEDURE — 99233 SBSQ HOSP IP/OBS HIGH 50: CPT | Mod: GC | Performed by: INTERNAL MEDICINE

## 2017-05-05 PROCEDURE — 25000128 H RX IP 250 OP 636

## 2017-05-05 PROCEDURE — 71010 XR CHEST PORT 1 VW: CPT

## 2017-05-05 PROCEDURE — 92526 ORAL FUNCTION THERAPY: CPT | Mod: GN | Performed by: SPEECH-LANGUAGE PATHOLOGIST

## 2017-05-05 RX ORDER — LISINOPRIL 5 MG/1
5 TABLET ORAL 2 TIMES DAILY
Status: DISCONTINUED | OUTPATIENT
Start: 2017-05-05 | End: 2017-05-07

## 2017-05-05 RX ORDER — LANOLIN ALCOHOL/MO/W.PET/CERES
3 CREAM (GRAM) TOPICAL
Status: DISCONTINUED | OUTPATIENT
Start: 2017-05-05 | End: 2017-05-12 | Stop reason: HOSPADM

## 2017-05-05 RX ADMIN — LISINOPRIL 5 MG: 5 TABLET ORAL at 20:28

## 2017-05-05 RX ADMIN — ATORVASTATIN CALCIUM 40 MG: 40 TABLET, FILM COATED ORAL at 20:28

## 2017-05-05 RX ADMIN — CLOPIDOGREL 75 MG: 75 TABLET, FILM COATED ORAL at 08:49

## 2017-05-05 RX ADMIN — Medication 12.5 MG: at 03:32

## 2017-05-05 RX ADMIN — Medication: at 08:41

## 2017-05-05 RX ADMIN — MULTIVIT AND MINERALS-FERROUS GLUCONATE 9 MG IRON/15 ML ORAL LIQUID 15 ML: at 08:49

## 2017-05-05 RX ADMIN — POTASSIUM CHLORIDE 20 MEQ: 29.8 INJECTION, SOLUTION INTRAVENOUS at 20:24

## 2017-05-05 RX ADMIN — BUMETANIDE 2 MG: 0.25 INJECTION INTRAMUSCULAR; INTRAVENOUS at 15:52

## 2017-05-05 RX ADMIN — PANTOPRAZOLE SODIUM 40 MG: 40 TABLET, DELAYED RELEASE ORAL at 08:49

## 2017-05-05 RX ADMIN — HYDRALAZINE HYDROCHLORIDE 75 MG: 50 TABLET ORAL at 05:07

## 2017-05-05 RX ADMIN — MELATONIN TAB 3 MG 3 MG: 3 TAB at 21:11

## 2017-05-05 RX ADMIN — HEPARIN SODIUM 750 UNITS/HR: 10000 INJECTION, SOLUTION INTRAVENOUS at 19:32

## 2017-05-05 RX ADMIN — INSULIN ASPART 1 UNITS: 100 INJECTION, SOLUTION INTRAVENOUS; SUBCUTANEOUS at 18:20

## 2017-05-05 RX ADMIN — PANTOPRAZOLE SODIUM 40 MG: 40 TABLET, DELAYED RELEASE ORAL at 20:28

## 2017-05-05 RX ADMIN — INSULIN GLARGINE 25 UNITS: 100 INJECTION, SOLUTION SUBCUTANEOUS at 08:51

## 2017-05-05 RX ADMIN — INSULIN ASPART 3 UNITS: 100 INJECTION, SOLUTION INTRAVENOUS; SUBCUTANEOUS at 08:49

## 2017-05-05 RX ADMIN — HYDRALAZINE HYDROCHLORIDE 75 MG: 50 TABLET ORAL at 12:00

## 2017-05-05 RX ADMIN — HYDRALAZINE HYDROCHLORIDE 75 MG: 50 TABLET ORAL at 18:43

## 2017-05-05 RX ADMIN — ASPIRIN 81 MG CHEWABLE TABLET 81 MG: 81 TABLET CHEWABLE at 08:49

## 2017-05-05 RX ADMIN — BUMETANIDE 2 MG: 0.25 INJECTION INTRAMUSCULAR; INTRAVENOUS at 08:50

## 2017-05-05 RX ADMIN — Medication: at 16:27

## 2017-05-05 RX ADMIN — DIGOXIN 125 MCG: 0.12 TABLET ORAL at 08:49

## 2017-05-05 RX ADMIN — INSULIN ASPART 3 UNITS: 100 INJECTION, SOLUTION INTRAVENOUS; SUBCUTANEOUS at 12:01

## 2017-05-05 NOTE — PLAN OF CARE
Problem: Goal Outcome Summary  Goal: Goal Outcome Summary  1) pt will be hemodynamically stable  2) pt will tolerate weaning of IABP  3) pain will be adequately controlled     Completed clinical bedside swallow eval per MD orders for re- re- eval now that pt is extubated ( pt is known to SLP dept as had swallow eval on 4/27/17). Pt presents with mild oral pharygneal dysphagia in the setting of s/p extubation, generalized weakness, fatigue, confusion and general deconditioning. Pt has a mild delayed swallow initiation ( intermittent) and mild reduction at times in layrngeal elevation. There were no s/s of aspiration on multipl trials of thin liquids by teaspoon and by straw or with small bites of puree and DD2 solids. Pt however does have some mildly slower oral prep with DD2 solids and minimal lingual stasis which clears when f/u with sips of thin liquids. Recommending that pt upgrade to a DD2 diet with thin liquids. Pt needs to be upright for all po, take small single bites/sips, alternate solids and liquids, pace self- eatl slowly; will need supervision and assist; currently still has NG TF. SLP to f/u for diet tolerance and readiness further further diet upgraded/ education.

## 2017-05-05 NOTE — PROGRESS NOTES
"[facility]  Cardiology II Service / Advanced Heart Failure  4/30/2017      Interval History:   - CI stable, IABP removed yesterday  - SCr stable        Intake/Output Summary (Last 24 hours) at 05/05/17 1520  Last data filed at 05/05/17 1500   Gross per 24 hour   Intake             1752 ml   Output             2710 ml   Net             -958 ml                 Pertinent Medications:  I have reviewed this patient's current medications      lisinopril  5 mg Oral BID     bumetanide  2 mg Intravenous BID     insulin glargine  25 Units Subcutaneous QAM AC     insulin aspart  1-10 Units Subcutaneous TID AC     insulin aspart  1-7 Units Subcutaneous At Bedtime     digoxin  125 mcg Oral Daily     hydrALAZINE  75 mg Oral Q6H     sodium chloride (PF)  3 mL Intracatheter Q8H     aspirin  81 mg Oral Daily     sodium chloride (PF)  3 mL Intracatheter Q8H     clopidogrel  75 mg Oral Daily     heparin lock flush  5-10 mL Intracatheter Q24H     QUEtiapine  12.5 mg Oral At Bedtime     pantoprazole  40 mg Per Feeding Tube BID     atorvastatin  40 mg Oral or Feeding Tube Daily at 8 pm     pneumococcal vaccine  0.5 mL Intramuscular Once     sodium chloride (PF)  3 mL Intracatheter Q8H     multivitamins with minerals  15 mL Per Feeding Tube Daily         Examination:  BP (!) 84/71  Temp 99.2  F (37.3  C) (Axillary)  Resp 14  Ht 1.575 m (5' 2.01\")  Wt 57 kg (125 lb 10.6 oz)  SpO2 99%  BMI 22.98 kg/m2  GEN: Intubated and sedated  NECK: can not assess JVP   RESP: +crackles   CV: S1S2S3, no murmurs that i could auscultate  ABD: Soft, nontender to palpation, no HSM, +BS, no guarding  EXT: No peripheral edema, warm/well perfused   SKIN: Normal skin turgor, no rash     Data:  CMP    Recent Labs  Lab 05/05/17  0343 05/04/17  1706 05/04/17  0359 05/03/17  1605 05/03/17  0449  05/02/17  1150 05/02/17  0416    138 140 142 144  < > 144 144   POTASSIUM 4.2 4.2 4.2 4.2 4.0  < > 3.8 3.9   CHLORIDE 100 " 102 105 103 109  < > 107 107   CO2 30 30 31 34* 30  < > 30 28   ANIONGAP 8 6 5 6 5  < > 7 8   * 143* 138* 119* 112*  < > 156* 138*   BUN 24 24 26 29 30  < > 37* 38*   CR 0.96 0.92 0.90 0.95 0.95  < > 1.30* 1.40*   GFRESTIMATED 83 87 89 84 84  < > 58* 54*   GFRESTBLACK >90African American GFR Calc >90African American GFR Calc >90African American GFR Calc >90African American GFR Calc >90African American GFR Calc  < > 71 65   ROB 7.9* 7.7* 8.1* 8.2* 7.5*  < > 8.2* 8.4*   MAG 2.3 2.3 2.5* 2.5* 2.6*  < > 2.7* 2.7*   PHOS 2.7  --  2.5  --  1.8*  --  2.6 3.6   PROTTOTAL 6.6*  --  6.5*  --  6.2*  --   --  7.2   ALBUMIN 2.2*  --  2.2*  --  2.1*  --   --  2.3*   BILITOTAL 0.3  --  0.9  --  0.4  --   --  0.6   ALKPHOS 68  --  68  --  65  --   --  76   AST 36  --  35  --  35  --   --  36   ALT 32  --  24  --  20  --   --  22   < > = values in this interval not displayed.  CBC    Recent Labs  Lab 05/05/17  0343 05/04/17  0359 05/03/17  0920 05/03/17  0449   WBC 8.0 8.5 9.7 10.1   RBC 2.45* 2.46* 2.46* 2.39*   HGB 7.4* 7.6* 7.5* 7.2*   HCT 24.9* 26.0* 25.8* 24.8*   * 106* 105* 104*   MCH 30.2 30.9 30.5 30.1   MCHC 29.7* 29.2* 29.1* 29.0*   RDW 20.0* 21.4* 21.1* 21.2*    369 414 399     INR    Recent Labs  Lab 05/03/17  0449   INR 1.25*     Arterial Blood Gas    Recent Labs  Lab 05/05/17  1152 05/05/17  0837 05/05/17  0343 05/05/17  0023  05/03/17  0000  05/01/17  1842 05/01/17  1542 05/01/17  1404   PH  --   --   --   --   --  7.49*  --  7.45 Incorrect specimen typeCORRECTED ON 05/01 AT 1726: PREVIOUSLY REPORTED AS 7.39 7.45   PCO2  --   --   --   --   --  36  --  38 Incorrect specimen typeCORRECTED ON 05/01 AT 1726: PREVIOUSLY REPORTED AS 46 37   PO2  --   --   --   --   --  132*  --  89 Incorrect specimen typeCORRECTED ON 05/01 AT 1726: PREVIOUSLY REPORTED AS 40 79*   HCO3  --   --   --   --   --  27  --  27 Incorrect specimen typeCORRECTED ON 05/01 AT 1726: PREVIOUSLY REPORTED AS 28 26   O2PER 2L 2L 2L 2L   < > 21  < > 3L Incorrect specimen typeCORRECTED ON 05/01 AT 1726: PREVIOUSLY REPORTED AS 3L  3L 30   < > = values in this interval not displayed.    Imaging Studies:    CTA heaf and neck  Impression:   1. Head CTA demonstrates at least moderate stenosis of both cavernous  internal carotid arteries. Diffuse mild to moderate narrowing of the  basilar artery. Additional areas of mild bilateral M2 segment  narrowing, presumably also related to atherosclerosis.  2. No aneurysm of the major intracranial arteries.   3. Neck CTA demonstrates no carotid artery stenosis. Short segment  mild narrowing of the proximal right V2 segment.  4. No change in probable bilateral cerebral white matter infarcts. No  acute intracranial hemorrhage    Echo 3/27  The visual ejection fraction is estimated at 30-35%.  There is extensive inferior, inferoseptal, and inferolateral wall akinesis.  Basal/mid lateral wall hypokinesis  There is moderate to severe septal hypokinesis.  Septal wall motion abnormality may reflect pacemaker activation.  There is a catheter/pacemaker lead seen in the right ventricle.  The right ventricle is mildly dilated.  Severely decreased right ventricular systolic function  There is no pericardial effusion.  The rhythm was paced.  Compared to the prior study, the LVEF appears somewhat decreased. Septal wall  motion abnormality larger- may reflect, in part, that patient in sinus tach on  prior study, and ventricular paced now. No hemodynamically significant  valvular abnormalities on 2D or color flow imaging     CT chest/abdomen 3/27   IMPRESSION:   1. Small mediastinal hemorrhage, small bilateral pleural effusions  which may be hemorrhagic. Small intraperitoneal hemorrhage. Minimal  pericardial hemorrhage.  2. Minimal pneumoperitoneum in the left paracolic gutter, of unknown  significance. No pneumatosis as clinically questioned.  3. IABP slightly low in position.  4. Bilateral pulmonary dependent consolidations  represent compression  atelectasis, however differentials include aspiration.     Cath 3/26 [facility]  SUMMARY:   --Inferior STEMI w/ late presentation. Culprit dictated by complete  heart block, and cardiogenic shock. Emergent echo in the Cath Lab also  shows significant RV dysfunction.  --Three vessel coronary artery disease with diffuse coronary disease  out to the distal vessels  --Successful POBA of the prox and mid-RCA. Stent was not placed, in  anticipation he may need to be considered for bypass surgery in the  near future  --Insertion of a temporary pacemaker for bradycardia (AVB) and  hypotension  --Insertion of a IABP  --Upper GI bleed consistent with Kaylin-Bonilal     [facility] 3/27     CT head 3/28: normal      Echo 3/29  Interpretation Summary  Limited study. Ischemic CM. Moderately (EF 30-35%) reduced left ventricular  function is present. Traced at 32%.  Posterior wall akinesis is present. Lateral wall akinesis is present. Inferior  wall akinesis is present.  Right ventricular function, chamber size, wall motion, and thickness are  normal.  Dilation of the inferior vena cava is present with abnormal respiratory  variation in diameter.     Compared to prior study, RV fxn is improved.     Echo 3/31  Left Ventricle  The Ejection Fraction is estimated at 50-55%. Inferior,posterior,lateral,basal  septal wall akinesis as reported before. No change.     CORONARY ANGIOGRAM 4/1:   1. Both coronary arteries arise from their respective cusps.  2. Right dominant.  3. LM has mild luminal irregularities.   4. LAD supplies the apex along with the RCA (type 2 LAD) and gives rise to septal perforators and a large and branching D1. There are widely patent stents extending from the proximal to mid LAD. The dLAD has mild to moderate disease to 30% stenosis. The D1 is jailed by the LAD stents, but has EBER 3 flow with disease to 30% stenosis.   5. The small ramus is no longer present.  6. LCX is occluded  at the ostium.   7. RCA gives rise to PL branches and supplies PDA. There are widely patent stents extending from the proximal to mid RCA. The remainder of the mRCA has disease to 50% stenosis and the dRCA has disease to 10% stenosis. The RPDA has a widely patent stent and the remainder of the artery has mild disease to 10% stenosis. The RPLA has small vessels with diffuse disease including a distal branch occlusion. The RPLA supplies some small collateral branches to the dLCx.      COMPLICATIONS:  1. None      SUMMARY:   1. Cardiogenic shock following an inferior STEMI.  2. The LCx re-occlusion is not surprising as the recently revascularized LCx  had poor outflow and the revascularized portion did not supply a large territory (limited to no flow was present distal to the stented segment immediately following stenting). Repeat revascularization of the LCx would result in the same outcome of repeat closure and also risk embolizing thrombus from the LCx into the LAD so no treatment of the occluded LCx was performed.   3. Three vessel coronary artery disease with patent LAD and RCA stents and an occluded LCx.     Echo 4/3  Left ventricular size is normal.  The Ejection Fraction is estimated at 35-40%.  Inferior wall akinesis is present.  Lateral wall akinesis is present.  Posterior wall akinesis is present.  Right ventricular function, chamber size, wall motion, and thickness are  normal.  Moderate mitral insufficiency is present.  Moderate tricuspid insufficiency is present.  Right ventricular systolic pressure is 47mmHg above the right atrial pressure.  The inferior vena cava is normal.     Echo 4/5  Moderately (EF 35-40%) reduced left ventricular function is present. Traced at  40%.  Moderate mitral insufficiency is present.  The cause of the mitral insufficiency appears to be restricted posterior  leaflet from posterior MI. No mitral leaflet pathology seen.  Dilation of the inferior vena cava is present with  abnormal respiratory  variation in diameter.     CT abd without contrast 4/4  IMPRESSION:   1. No evidence of ischemic bowel, obstruction, or intra-abdominal  infection.  2. Bibasilar patchy pulmonary opacities likely represent combination  of aspiration and atelectasis.  3. Small amount of simple free fluid within the lower abdomen.  4. Small left retroperitoneal hematoma along the iliac vasculature  likely postprocedural.   5. Unchanged size of bilateral pleural effusions, which now consist of  simple fluid.    Assessment and Plan  51 year old man presented with cardiogenic shock from inferior STEMI s/p RCA aspiration thrombectomy and POBA on 3/26 at [facility] s/p IABP and initiation of Dopamine, also during procedure, CHB s/p temp pacer, emesis/hematmesis and altered mental status s/p intubation transferred here for consideration of mechanical support on 3/27. Had PCI to RCA, LAD and LCx (on ASA and plavix) started on epinephrine in addition to dopamine, post procedure developed distributive shock picture from infection (aspiration pneumonia? Sputum cx growing staph aureus, on vanco and zosyn) vs post-MI SIRS response. Currently in cardiogenic shock with IABP in situ    Card  # Cardiogenic shock 4/3- from underlying 3v CAD-has had high SVR and low CI, complicated by ischemic MR  # Due to inferior wall STEMI. S/P 7 stents to LAD, circ, RCA (angiogram report pending). LCx was  and is re-occluded.   - Cortisol normal  - Able to remove IABP after MitraClip placement  - Stopping captopril, starting Lisinopril 5 mg BID      # Neuro  - CVA presenting with left hand weakness and pain  - CTA head/neck shows no hemorrhage but new areas of low attenuation in b/l hemispheres  - Neuro consulted. Continuing ASA, Plavix and statin  - MAP goal > 70        Respiratory  - Intubated in the setting of worsening hypoxia on 4/25, extubated 4/28  - Possible causes: aspiration PNA vs PE vs volume overload   - Treated with broad  spectrum abx for two weeks starting 4/19  - Diuresing to CVP < 12, currently on 2L NC             # ID  - UA 4/15 with cloudy urine, 50 WBCs, many bacteria, no nitrates. UCx grew E coli - treated with CFTX (sensitive)  - Fever on 4/20 and single spike 100.2 overnight with blood cultures in process (no growth thus far), PICC and SGC removed, started on Vanc and pip/tazo.   - Treatment completed on 5/1           # Endocrine  T2DM: HA1C 7.5 on admission.  - Insulin gtt per nurising protocol with hypoglycemia precautions.   - Switch to SQ Lantus 4/22      # GI  - Had several maroon BMs since 4/11, Hb overall dropped by a 1.5 grams or so, heparin inf was held and GI consulted  - Colonoscopy 4/17 showed a rectal ulcer but no intervenable lesions  - Will continue to monitor Hb, back on heparin inf  - Protonix 40 PO BID         # Renal/  - Daily Cr  - Avoid nephrotoxins as able  - Plan to keep CVP < 12     -Hypernatremia  - Resolved. Stopped his D5W, only getting FWF's.        FEN: feeding tube, and dysphagia level II  Code: FULL  PPx: PPI 40mg BID, senna PRN  Dispo: pending stability    LINES:  - LUE PICC   4/20  - Chavis catheter  5/3  - Lt IJ                            4/29        Plan of care discussed with Dr. Jamison Loving MD  Cardiovascular Disease Fellow  Pager: [phone]

## 2017-05-05 NOTE — PROGRESS NOTES
05/05/17 1600   General Information   Onset Date 03/27/17   Start of Care Date 05/05/17   Referring Physician Ashvin Camilo MD   Patient/Family Goals Statement To keep eating/drinking- wanted more applesauce   Swallowing Evaluation Bedside swallow evaluation   Behaviorial Observations Confused   Respiratory Status Intubated on (date);Extubated on (date);O2 Supply   Type of O2 supply Nasal cannula   Comments 51 year old man presented with cardiogenic shock from inferior STEMI s/p RCA aspiration thrombectomy and POBA on 3/26 at [facility] s/p IABP and initiation of Dopamine, also during procedure, CHB s/p temp pacer, emesis/hematmesis and altered mental status s/p intubation transferred here for consideration of mechanical support on 3/27. Had PCI to RCA, LAD and LCx (on ASA and plavix) started on epinephrine in addition to dopamine, post procedure developed distributive shock picture from infection (aspiration pneumonia? Sputum cx growing staph aureus, on vanco and zosyn) vs post-MI SIRS response. Also bilateral hemis strokes - sub acute. Pt also had been seen by speech tx on 4/27/17 for swallow eval- assessed as having mild dysphagia at that time and reocmmended to be on a Full liquid diet. Pt then underwent a procedure which requireid re- intubation and remained intubated- so SLP signed off 5/1/17- now extubated    Clinical Swallow Evaluation   Oral Musculature unable to assess due to poor participation/comprehension   Structural Abnormalities none present   Dentition present and adequate   Mucosal Quality good   Mandibular Strength and Mobility intact   Oral Labial Strength and Mobility other (see comments)  ( appears adequate- but no following commands)   Lingual Strength and Mobility other (see comments)  (appears adequate but not following commands)   Buccal Strength and Mobility intact   Laryngeal Function Swallow;Voicing initiated;Dry swallow palpated  (pt not coughing or throat clearing on command)    Additional Documentation Yes   Additional evaluation(s) completed today Yes;No   Swallow Eval   Feeding Assistance dependent   Clinical Swallow Eval: Thin Liquid Texture Trial   Mode of Presentation, Thin Liquids spoon;straw;fed by clinician   Volume of Liquid or Food Presented 4 oz   Oral Phase of Swallow other (see comments)  (intermitten mild swallow delay)   Pharyngeal Phase of Swallow other (see comments)  (mild swallow delay)   Successful Strategies Trialed During Procedure hard swallow   Diagnostic Statement No aspiration s/s with thin liquid trials of ice chips, small sips by teaspoon or by straw; mild intermitten swallow delay and mild reduction in larygneal elevation but despite this pt managing thin liquids well at the pharygneal level   Clinical Swallow Eval: Puree Solid Texture Trial   Mode of Presentation, Puree spoon;fed by clinician   Volume of Puree Presented 3 teaspoon amts   Oral Phase, Puree WFL   Pharyngeal Phase, Puree intact   Successful Strategies Trialed During Procedure, Puree hard swallow   Diagnostic Statement No s/s of aspiration with puree solids and no sensation of pharygneal retention; sometimes swallowing 2 times for each bite   Clinical Swallow Eval: Semisolid Texture Trial   Mode of Presentation, Semisolid spoon;fed by clinician   Volume of Semisolid Food Presented small bites- 2 oz   Oral Phase, Semisolid Residue in oral cavity;other (see comments)  (mild slower oral prep)   Oral Residue, Semisolid mid posterior tongue   Pharyngeal Phase, Semisolid intact   Successful Strategies Trialed During Procedure, Semisolid hard swallow   Diagnostic Statement No overt s/s of aspiration ; pt with mildly slower oral prep; mild lingual stasis- clears when f/u with sips of liquids; no sensation of pharyngeal retention   VFSS Evaluation   VFSS Additional Documentation No   FEES Evaluation   Additional Documentation No   Swallow Compensations   Swallow Compensations Alternate viscosity of  consistencies;Effortful swallow;Pacing;Reduce amounts;Multiple swallow   Results Oral difficulties only   Esophageal Phase of Swallow   Patient reports or presents with symptoms of esophageal dysphagia No   General Therapy Interventions   Planned Therapy Interventions Dysphagia Treatment   Dysphagia treatment Modified diet education;Instruction of safe swallow strategies   Swallow Eval: Clinical Impressions   Skilled Criteria for Therapy Intervention Skilled criteria met.  Treatment indicated.   Functional Assessment Scale (FAS) 5   Dysphagia Outcome Severity Scale (KARIE) Level 5 - KARIE   Treatment Diagnosis mild oral pharyngeal dysphagia   Diet texture recommendations Dysphagia diet level 2;Thin liquids   Recommended Feeding/Eating Techniques alternate between small bites and sips of food/liquid;check mouth frequently for oral residue/pocketing;hard swallow w/ each bite or sip;maintain upright posture during/after eating for 30 mins;small sips/bites   Demonstrates Need for Referral to Another Service occupational therapy;physical therapy   Therapy Frequency 5 times/wk   Predicted Duration of Therapy Intervention (days/wks) 2 weeks   Anticipated Discharge Disposition inpatient rehabilitation facility   Risks and Benefits of Treatment have been explained. Yes   Patient, family and/or staff in agreement with Plan of Care Yes   Clinical Impression Comments Completed clinical bedside swallow eval per MD orders for re- re- eval now that pt is extubated. Pt presents with mild oral pharygneal dysphagia in the setting of s/p extubation, generalized weakness, fatigue, confusion and general deconditioning. Pt has a mild delayed swallow initiation ( intermittent) and mild reduction at times in layrngeal elevation. There were no s/s of aspiration on multipl trials of thin liquids by teaspoon and by straw or with small bites of puree and DD2 solids. Pt however does have some mildly slower oral prep with DD2 solids and minimal  lingual stasis which clears when f/u with sips of thin liquids. Recommending that pt  upgrade to a DD2 diet with thin liquids. Pt needs to be upright for all po, take small single bites/sips, alternate solids and liquids, pace self- eatl slowly; will need supervision and assist; currently still has NG TF. SLP to f/u for diet tolerance and readiness further further diet upgraded/ education.    Total Evaluation Time   Total Evaluation Time (Minutes) 15

## 2017-05-05 NOTE — PLAN OF CARE
Problem: Goal Outcome Summary  Goal: Goal Outcome Summary  1) pt will be hemodynamically stable  2) pt will tolerate weaning of IABP  3) pain will be adequately controlled     PT 4E: Pt with improved mobility on this date, however still far below baseline. Engaged in bed mobility and transfer training. Requires mod A for supine>sit, mod A for sit<>stand with HHA, and min A x2 with HHA for bed>chair transfer. Pt with standing tolerance up to ~60-90 seconds. Hemodynamically stable on 2 LPM via NC. C/o dizziness during session - BP taken and stable. Limited 2/2 to functional strength/balance deficits, dizziness, and decreased activity tolerance. Continues to benefit from skilled PT to address stated deficits to progress towards IND and safe PLOF.     REC: IP rehab once medically stable.

## 2017-05-05 NOTE — PLAN OF CARE
Problem: Goal Outcome Summary  Goal: Goal Outcome Summary  1) pt will be hemodynamically stable  2) pt will tolerate weaning of IABP  3) pain will be adequately controlled     Outcome: Improving  Alert to self and situation. Follows commands. Needs frequent reinforcement for self cares as able. Low grade fever at start of NOC shift. PRN tylenol given. Afebrile to time. ST with rates 110-115. MAPs 65-80. SVO2- 50-60%. CVP 13,15,15. CO 5-6 CI 3-4. 2L via NC for 02 sats above 92%. Lungs clear and diminished. Need encouragement for IS use. UOP 30-100cc.hr. BS active. Heparin therapeutic this AM. Heparin gtt 750 units/hr. TKO-5

## 2017-05-05 NOTE — PLAN OF CARE
Problem: Goal Outcome Summary  Goal: Goal Outcome Summary  1) pt will be hemodynamically stable  2) pt will tolerate weaning of IABP  3) pain will be adequately controlled     OT 4E: Pt continues to progress in therapies. Pt engaged in bed mobility, functional transfer, and LUE neuro re-education to increase independence with ADLs. Pt supine > EOB with mod A. Pt completed sit <> stand with min A x2. Pt completed transfer to bedside chair with min A x2 and tactile cues for LLE foot placement throughout. Pt provided with z-yarelis cushion to prevent increased pain/pressure on sacral wound. Pt's HR 110s with activity, O2 >93% on 2L NC.      Rec TCU when medically stable to address cognitive impairment, L sided weakness, balance deficits, and decreased activity tolerance.

## 2017-05-06 ENCOUNTER — APPOINTMENT (OUTPATIENT)
Dept: PHYSICAL THERAPY | Facility: CLINIC | Age: 52
DRG: 228 | End: 2017-05-06
Attending: INTERNAL MEDICINE
Payer: COMMERCIAL

## 2017-05-06 ENCOUNTER — APPOINTMENT (OUTPATIENT)
Dept: OCCUPATIONAL THERAPY | Facility: CLINIC | Age: 52
DRG: 228 | End: 2017-05-06
Attending: INTERNAL MEDICINE
Payer: COMMERCIAL

## 2017-05-06 ENCOUNTER — APPOINTMENT (OUTPATIENT)
Dept: GENERAL RADIOLOGY | Facility: CLINIC | Age: 52
DRG: 228 | End: 2017-05-06
Attending: INTERNAL MEDICINE
Payer: COMMERCIAL

## 2017-05-06 LAB
ALBUMIN SERPL-MCNC: 2.2 G/DL (ref 3.4–5)
ALP SERPL-CCNC: 65 U/L (ref 40–150)
ALT SERPL W P-5'-P-CCNC: 30 U/L (ref 0–70)
ANION GAP SERPL CALCULATED.3IONS-SCNC: 7 MMOL/L (ref 3–14)
ANION GAP SERPL CALCULATED.3IONS-SCNC: 7 MMOL/L (ref 3–14)
AST SERPL W P-5'-P-CCNC: 28 U/L (ref 0–45)
BASE EXCESS BLDV CALC-SCNC: 4.8 MMOL/L
BASE EXCESS BLDV CALC-SCNC: 4.9 MMOL/L
BASE EXCESS BLDV CALC-SCNC: 5 MMOL/L
BASE EXCESS BLDV CALC-SCNC: 5.9 MMOL/L
BASE EXCESS BLDV CALC-SCNC: 6.1 MMOL/L
BASE EXCESS BLDV CALC-SCNC: 6.5 MMOL/L
BASE EXCESS BLDV CALC-SCNC: 7 MMOL/L
BASOPHILS # BLD AUTO: 0.1 10E9/L (ref 0–0.2)
BASOPHILS NFR BLD AUTO: 0.7 %
BILIRUB DIRECT SERPL-MCNC: 0.1 MG/DL (ref 0–0.2)
BILIRUB SERPL-MCNC: 0.4 MG/DL (ref 0.2–1.3)
BUN SERPL-MCNC: 22 MG/DL (ref 7–30)
BUN SERPL-MCNC: 22 MG/DL (ref 7–30)
CA-I BLD-MCNC: 4.5 MG/DL (ref 4.4–5.2)
CALCIUM SERPL-MCNC: 7.4 MG/DL (ref 8.5–10.1)
CALCIUM SERPL-MCNC: 7.6 MG/DL (ref 8.5–10.1)
CHLORIDE SERPL-SCNC: 100 MMOL/L (ref 94–109)
CHLORIDE SERPL-SCNC: 101 MMOL/L (ref 94–109)
CO2 SERPL-SCNC: 28 MMOL/L (ref 20–32)
CO2 SERPL-SCNC: 30 MMOL/L (ref 20–32)
CREAT SERPL-MCNC: 0.87 MG/DL (ref 0.66–1.25)
CREAT SERPL-MCNC: 0.88 MG/DL (ref 0.66–1.25)
DIFFERENTIAL METHOD BLD: ABNORMAL
EOSINOPHIL # BLD AUTO: 0.2 10E9/L (ref 0–0.7)
EOSINOPHIL NFR BLD AUTO: 2.1 %
ERYTHROCYTE [DISTWIDTH] IN BLOOD BY AUTOMATED COUNT: 19.3 % (ref 10–15)
GFR SERPL CREATININE-BSD FRML MDRD: ABNORMAL ML/MIN/1.7M2
GFR SERPL CREATININE-BSD FRML MDRD: ABNORMAL ML/MIN/1.7M2
GLUCOSE BLDC GLUCOMTR-MCNC: 165 MG/DL (ref 70–99)
GLUCOSE BLDC GLUCOMTR-MCNC: 175 MG/DL (ref 70–99)
GLUCOSE BLDC GLUCOMTR-MCNC: 182 MG/DL (ref 70–99)
GLUCOSE BLDC GLUCOMTR-MCNC: 189 MG/DL (ref 70–99)
GLUCOSE SERPL-MCNC: 134 MG/DL (ref 70–99)
GLUCOSE SERPL-MCNC: 163 MG/DL (ref 70–99)
HCO3 BLDV-SCNC: 30 MMOL/L (ref 21–28)
HCO3 BLDV-SCNC: 31 MMOL/L (ref 21–28)
HCO3 BLDV-SCNC: 31 MMOL/L (ref 21–28)
HCO3 BLDV-SCNC: 32 MMOL/L (ref 21–28)
HCT VFR BLD AUTO: 25.1 % (ref 40–53)
HGB BLD-MCNC: 7.5 G/DL (ref 13.3–17.7)
IMM GRANULOCYTES # BLD: 0.1 10E9/L (ref 0–0.4)
IMM GRANULOCYTES NFR BLD: 0.9 %
INR PPP: 1.14 (ref 0.86–1.14)
LMWH PPP CHRO-ACNC: 0.29 IU/ML
LYMPHOCYTES # BLD AUTO: 2.4 10E9/L (ref 0.8–5.3)
LYMPHOCYTES NFR BLD AUTO: 26.9 %
MAGNESIUM SERPL-MCNC: 1.9 MG/DL (ref 1.6–2.3)
MAGNESIUM SERPL-MCNC: 2.1 MG/DL (ref 1.6–2.3)
MCH RBC QN AUTO: 30.5 PG (ref 26.5–33)
MCHC RBC AUTO-ENTMCNC: 29.9 G/DL (ref 31.5–36.5)
MCV RBC AUTO: 102 FL (ref 78–100)
MONOCYTES # BLD AUTO: 1 10E9/L (ref 0–1.3)
MONOCYTES NFR BLD AUTO: 11.3 %
NEUTROPHILS # BLD AUTO: 5.2 10E9/L (ref 1.6–8.3)
NEUTROPHILS NFR BLD AUTO: 58.1 %
NRBC # BLD AUTO: 0 10*3/UL
NRBC BLD AUTO-RTO: 0 /100
O2/TOTAL GAS SETTING VFR VENT: 21 %
O2/TOTAL GAS SETTING VFR VENT: ABNORMAL %
OXYHGB MFR BLDV: 47 %
OXYHGB MFR BLDV: 49 %
OXYHGB MFR BLDV: 51 %
OXYHGB MFR BLDV: 52 %
OXYHGB MFR BLDV: 57 %
OXYHGB MFR BLDV: 59 %
OXYHGB MFR BLDV: 65 %
PCO2 BLDV: 45 MM HG (ref 40–50)
PCO2 BLDV: 46 MM HG (ref 40–50)
PCO2 BLDV: 49 MM HG (ref 40–50)
PCO2 BLDV: 50 MM HG (ref 40–50)
PCO2 BLDV: 50 MM HG (ref 40–50)
PH BLDV: 7.39 PH (ref 7.32–7.43)
PH BLDV: 7.41 PH (ref 7.32–7.43)
PH BLDV: 7.42 PH (ref 7.32–7.43)
PH BLDV: 7.42 PH (ref 7.32–7.43)
PH BLDV: 7.43 PH (ref 7.32–7.43)
PH BLDV: 7.44 PH (ref 7.32–7.43)
PH BLDV: 7.45 PH (ref 7.32–7.43)
PHOSPHATE SERPL-MCNC: 2.9 MG/DL (ref 2.5–4.5)
PLATELET # BLD AUTO: 361 10E9/L (ref 150–450)
PO2 BLDV: 30 MM HG (ref 25–47)
PO2 BLDV: 32 MM HG (ref 25–47)
PO2 BLDV: 35 MM HG (ref 25–47)
PO2 BLDV: 36 MM HG (ref 25–47)
PO2 BLDV: 40 MM HG (ref 25–47)
POTASSIUM SERPL-SCNC: 3.8 MMOL/L (ref 3.4–5.3)
POTASSIUM SERPL-SCNC: 4.2 MMOL/L (ref 3.4–5.3)
PROT SERPL-MCNC: 6.7 G/DL (ref 6.8–8.8)
RBC # BLD AUTO: 2.46 10E12/L (ref 4.4–5.9)
SODIUM SERPL-SCNC: 136 MMOL/L (ref 133–144)
SODIUM SERPL-SCNC: 136 MMOL/L (ref 133–144)
WBC # BLD AUTO: 8.9 10E9/L (ref 4–11)

## 2017-05-06 PROCEDURE — 86900 BLOOD TYPING SEROLOGIC ABO: CPT | Performed by: INTERNAL MEDICINE

## 2017-05-06 PROCEDURE — 97530 THERAPEUTIC ACTIVITIES: CPT | Mod: GP | Performed by: REHABILITATION PRACTITIONER

## 2017-05-06 PROCEDURE — 25000132 ZZH RX MED GY IP 250 OP 250 PS 637: Performed by: INTERNAL MEDICINE

## 2017-05-06 PROCEDURE — 71010 XR CHEST PORT 1 VW: CPT

## 2017-05-06 PROCEDURE — 82330 ASSAY OF CALCIUM: CPT | Performed by: INTERNAL MEDICINE

## 2017-05-06 PROCEDURE — 80048 BASIC METABOLIC PNL TOTAL CA: CPT | Performed by: INTERNAL MEDICINE

## 2017-05-06 PROCEDURE — 25000132 ZZH RX MED GY IP 250 OP 250 PS 637

## 2017-05-06 PROCEDURE — 82805 BLOOD GASES W/O2 SATURATION: CPT | Performed by: INTERNAL MEDICINE

## 2017-05-06 PROCEDURE — 86923 COMPATIBILITY TEST ELECTRIC: CPT | Performed by: INTERNAL MEDICINE

## 2017-05-06 PROCEDURE — S0171 BUMETANIDE 0.5 MG: HCPCS

## 2017-05-06 PROCEDURE — 86850 RBC ANTIBODY SCREEN: CPT | Performed by: INTERNAL MEDICINE

## 2017-05-06 PROCEDURE — 85025 COMPLETE CBC W/AUTO DIFF WBC: CPT | Performed by: INTERNAL MEDICINE

## 2017-05-06 PROCEDURE — 20000004 ZZH R&B ICU UMMC

## 2017-05-06 PROCEDURE — 40000193 ZZH STATISTIC PT WARD VISIT: Performed by: REHABILITATION PRACTITIONER

## 2017-05-06 PROCEDURE — 99231 SBSQ HOSP IP/OBS SF/LOW 25: CPT | Mod: GC | Performed by: INTERNAL MEDICINE

## 2017-05-06 PROCEDURE — 97530 THERAPEUTIC ACTIVITIES: CPT | Mod: GO

## 2017-05-06 PROCEDURE — 25000125 ZZHC RX 250: Performed by: INTERNAL MEDICINE

## 2017-05-06 PROCEDURE — 83735 ASSAY OF MAGNESIUM: CPT | Performed by: INTERNAL MEDICINE

## 2017-05-06 PROCEDURE — 97110 THERAPEUTIC EXERCISES: CPT | Mod: GP | Performed by: REHABILITATION PRACTITIONER

## 2017-05-06 PROCEDURE — 80076 HEPATIC FUNCTION PANEL: CPT | Performed by: INTERNAL MEDICINE

## 2017-05-06 PROCEDURE — 85520 HEPARIN ASSAY: CPT | Performed by: INTERNAL MEDICINE

## 2017-05-06 PROCEDURE — 40000133 ZZH STATISTIC OT WARD VISIT

## 2017-05-06 PROCEDURE — 85610 PROTHROMBIN TIME: CPT | Performed by: INTERNAL MEDICINE

## 2017-05-06 PROCEDURE — 86901 BLOOD TYPING SEROLOGIC RH(D): CPT | Performed by: INTERNAL MEDICINE

## 2017-05-06 PROCEDURE — 25000132 ZZH RX MED GY IP 250 OP 250 PS 637: Performed by: STUDENT IN AN ORGANIZED HEALTH CARE EDUCATION/TRAINING PROGRAM

## 2017-05-06 PROCEDURE — 84100 ASSAY OF PHOSPHORUS: CPT | Performed by: INTERNAL MEDICINE

## 2017-05-06 PROCEDURE — 40000275 ZZH STATISTIC RCP TIME EA 10 MIN

## 2017-05-06 PROCEDURE — 40000196 ZZH STATISTIC RAPCV CVP MONITORING

## 2017-05-06 PROCEDURE — S0171 BUMETANIDE 0.5 MG: HCPCS | Performed by: STUDENT IN AN ORGANIZED HEALTH CARE EDUCATION/TRAINING PROGRAM

## 2017-05-06 PROCEDURE — 25000125 ZZHC RX 250: Performed by: STUDENT IN AN ORGANIZED HEALTH CARE EDUCATION/TRAINING PROGRAM

## 2017-05-06 PROCEDURE — 00000146 ZZHCL STATISTIC GLUCOSE BY METER IP

## 2017-05-06 PROCEDURE — 27210437 ZZH NUTRITION PRODUCT SEMIELEM INTERMED LITER

## 2017-05-06 PROCEDURE — 25000128 H RX IP 250 OP 636

## 2017-05-06 PROCEDURE — 25000125 ZZHC RX 250

## 2017-05-06 RX ORDER — BUMETANIDE 0.25 MG/ML
1 INJECTION INTRAMUSCULAR; INTRAVENOUS ONCE
Status: COMPLETED | OUTPATIENT
Start: 2017-05-06 | End: 2017-05-06

## 2017-05-06 RX ORDER — HYDRALAZINE HYDROCHLORIDE 50 MG/1
50 TABLET, FILM COATED ORAL 4 TIMES DAILY
Status: DISCONTINUED | OUTPATIENT
Start: 2017-05-06 | End: 2017-05-07

## 2017-05-06 RX ORDER — BUMETANIDE 2 MG/1
2 TABLET ORAL
Status: DISCONTINUED | OUTPATIENT
Start: 2017-05-06 | End: 2017-05-08

## 2017-05-06 RX ADMIN — PANTOPRAZOLE SODIUM 40 MG: 40 TABLET, DELAYED RELEASE ORAL at 08:19

## 2017-05-06 RX ADMIN — SENNOSIDES AND DOCUSATE SODIUM 1 TABLET: 8.6; 5 TABLET ORAL at 11:32

## 2017-05-06 RX ADMIN — HYDRALAZINE HYDROCHLORIDE 75 MG: 50 TABLET ORAL at 06:18

## 2017-05-06 RX ADMIN — PANTOPRAZOLE SODIUM 40 MG: 40 TABLET, DELAYED RELEASE ORAL at 19:59

## 2017-05-06 RX ADMIN — HEPARIN SODIUM 750 UNITS/HR: 10000 INJECTION, SOLUTION INTRAVENOUS at 22:15

## 2017-05-06 RX ADMIN — ASPIRIN 81 MG CHEWABLE TABLET 81 MG: 81 TABLET CHEWABLE at 08:18

## 2017-05-06 RX ADMIN — MULTIVIT AND MINERALS-FERROUS GLUCONATE 9 MG IRON/15 ML ORAL LIQUID 15 ML: at 08:18

## 2017-05-06 RX ADMIN — INSULIN ASPART 2 UNITS: 100 INJECTION, SOLUTION INTRAVENOUS; SUBCUTANEOUS at 16:22

## 2017-05-06 RX ADMIN — OXYCODONE HYDROCHLORIDE 10 MG: 5 TABLET ORAL at 19:59

## 2017-05-06 RX ADMIN — ATORVASTATIN CALCIUM 40 MG: 40 TABLET, FILM COATED ORAL at 19:59

## 2017-05-06 RX ADMIN — CLOPIDOGREL 75 MG: 75 TABLET, FILM COATED ORAL at 08:18

## 2017-05-06 RX ADMIN — POTASSIUM CHLORIDE 20 MEQ: 1.5 POWDER, FOR SOLUTION ORAL at 17:50

## 2017-05-06 RX ADMIN — OXYCODONE HYDROCHLORIDE 10 MG: 5 TABLET ORAL at 00:51

## 2017-05-06 RX ADMIN — BUMETANIDE 2 MG: 0.25 INJECTION INTRAMUSCULAR; INTRAVENOUS at 08:18

## 2017-05-06 RX ADMIN — SALINE NASAL SPRAY 1 SPRAY: 1.5 SOLUTION NASAL at 01:01

## 2017-05-06 RX ADMIN — LISINOPRIL 5 MG: 5 TABLET ORAL at 08:18

## 2017-05-06 RX ADMIN — WARFARIN SODIUM 7.5 MG: 2.5 TABLET ORAL at 17:50

## 2017-05-06 RX ADMIN — LISINOPRIL 5 MG: 5 TABLET ORAL at 19:59

## 2017-05-06 RX ADMIN — HYDRALAZINE HYDROCHLORIDE 50 MG: 50 TABLET ORAL at 11:32

## 2017-05-06 RX ADMIN — HYDRALAZINE HYDROCHLORIDE 75 MG: 50 TABLET ORAL at 00:52

## 2017-05-06 RX ADMIN — SALINE NASAL SPRAY 1 SPRAY: 1.5 SOLUTION NASAL at 19:47

## 2017-05-06 RX ADMIN — DIGOXIN 125 MCG: 0.12 TABLET ORAL at 08:19

## 2017-05-06 RX ADMIN — HYDRALAZINE HYDROCHLORIDE 50 MG: 50 TABLET ORAL at 16:12

## 2017-05-06 RX ADMIN — BUMETANIDE 2 MG: 2 TABLET ORAL at 16:12

## 2017-05-06 RX ADMIN — BUMETANIDE 1 MG: 0.25 INJECTION INTRAMUSCULAR; INTRAVENOUS at 00:56

## 2017-05-06 RX ADMIN — ACETAMINOPHEN 650 MG: 325 TABLET, FILM COATED ORAL at 17:50

## 2017-05-06 RX ADMIN — INSULIN GLARGINE 25 UNITS: 100 INJECTION, SOLUTION SUBCUTANEOUS at 08:24

## 2017-05-06 RX ADMIN — Medication 2 G: at 17:50

## 2017-05-06 RX ADMIN — INSULIN ASPART 2 UNITS: 100 INJECTION, SOLUTION INTRAVENOUS; SUBCUTANEOUS at 08:24

## 2017-05-06 RX ADMIN — INSULIN ASPART 2 UNITS: 100 INJECTION, SOLUTION INTRAVENOUS; SUBCUTANEOUS at 12:06

## 2017-05-06 RX ADMIN — SALINE NASAL SPRAY 1 SPRAY: 1.5 SOLUTION NASAL at 03:16

## 2017-05-06 RX ADMIN — HYDRALAZINE HYDROCHLORIDE 50 MG: 50 TABLET ORAL at 21:21

## 2017-05-06 NOTE — PLAN OF CARE
Problem: Goal Outcome Summary  Goal: Goal Outcome Summary  1) pt will be hemodynamically stable  2) pt will tolerate weaning of IABP  3) pain will be adequately controlled     Outcome: Improving  VSS, afebrile, denies any SOB on RA, sometimes on 2L via NC. Pt is having pain at his pressure ulcer site on his coccyx. Topical morphine is being used for that when needed. Pt's CVPs have been 13 all shift. -1200. Pt having good urine output, he receives bumex BID. Pt's K and Mg have been replaced this shift. He has heparin gtt at 750. Pt started coumadin today. Plan is to possible transfer to  tomorrow if pt remains stable. Will continue to monitor and update as needed.

## 2017-05-06 NOTE — PLAN OF CARE
Problem: Goal Outcome Summary  Goal: Goal Outcome Summary  1) pt will be hemodynamically stable  2) pt will tolerate weaning of IABP  3) pain will be adequately controlled     Outcome: Therapy, progress toward functional goals as expected  4E PT - ARU to progress transfers and gait.  Pt continues to c/o dizziness with mobility, but O2 and BP stable.  Pt transferred to chair with Ax2, R knee starting to buckle as fatigued.

## 2017-05-06 NOTE — PLAN OF CARE
Problem: Goal Outcome Summary  Goal: Goal Outcome Summary  1) pt will be hemodynamically stable  2) pt will tolerate weaning of IABP  3) pain will be adequately controlled     Outcome: No Change  No acute events overnight.     BPs have been soft but stable, SBPs 80s, MAPs 70s, tachy to 110s. CVP 17-->16--17, SVO2s 50s-60s, CIs 2s-3s, SVR 1200--->800s. Pt received bolus dose of bumex 1 mg overnight d/t elevated CVP with response in UOP but little change in CVP noted. UOP adequate.      Satting well on 3L NC.      Pt restless overnight and reported not sleeping well.

## 2017-05-06 NOTE — PHARMACY-ANTICOAGULATION SERVICE
Clinical Pharmacy - Warfarin Dosing Consult     Pharmacy has been consulted to manage this patient s warfarin therapy.  Indication: DVT/ PE Treatment  Therapy Goal: INR 2-3 +++ADDENDUM INR Goal decreased to 2-2.5+++ per Dr. Butterfield  Provider/Team: CARDS II  Warfarin Prior to Admission: No  Significant drug interactions: aspirin, atorvastatin, clopidogrel, heparin drio (as bridge to warfarin therpay)  Recent documented change in oral intake/nutrition: Yes  Dose Comments: on enteral feeds which have the potential to interfere with warfarin absorbption    INR   Date Value Ref Range Status   05/06/2017 1.14 0.86 - 1.14 Final   05/03/2017 1.25 (H) 0.86 - 1.14 Final       Recommend warfarin 7.5 mg today.  Pharmacy will monitor Bong V Henao daily and order warfarin doses to achieve specified goal.      Please contact pharmacy as soon as possible if the warfarin needs to be held for a procedure or if the warfarin goals change.      Kailey Marcial, GeorgeD

## 2017-05-07 ENCOUNTER — APPOINTMENT (OUTPATIENT)
Dept: PHYSICAL THERAPY | Facility: CLINIC | Age: 52
DRG: 228 | End: 2017-05-07
Attending: INTERNAL MEDICINE
Payer: COMMERCIAL

## 2017-05-07 ENCOUNTER — APPOINTMENT (OUTPATIENT)
Dept: GENERAL RADIOLOGY | Facility: CLINIC | Age: 52
DRG: 228 | End: 2017-05-07
Attending: INTERNAL MEDICINE
Payer: COMMERCIAL

## 2017-05-07 LAB
ABO + RH BLD: NORMAL
ABO + RH BLD: NORMAL
ALBUMIN SERPL-MCNC: 2.3 G/DL (ref 3.4–5)
ALP SERPL-CCNC: 70 U/L (ref 40–150)
ALT SERPL W P-5'-P-CCNC: 28 U/L (ref 0–70)
ANION GAP SERPL CALCULATED.3IONS-SCNC: 5 MMOL/L (ref 3–14)
ANION GAP SERPL CALCULATED.3IONS-SCNC: 7 MMOL/L (ref 3–14)
AST SERPL W P-5'-P-CCNC: 28 U/L (ref 0–45)
BASE DEFICIT BLDV-SCNC: 2.7 MMOL/L
BASE EXCESS BLDV CALC-SCNC: 4.2 MMOL/L
BASE EXCESS BLDV CALC-SCNC: 5.5 MMOL/L
BASE EXCESS BLDV CALC-SCNC: 6.7 MMOL/L
BASOPHILS # BLD AUTO: 0.1 10E9/L (ref 0–0.2)
BASOPHILS NFR BLD AUTO: 0.7 %
BILIRUB DIRECT SERPL-MCNC: 0.1 MG/DL (ref 0–0.2)
BILIRUB SERPL-MCNC: 0.4 MG/DL (ref 0.2–1.3)
BLD GP AB SCN SERPL QL: NORMAL
BLD PROD TYP BPU: NORMAL
BLOOD BANK CMNT PATIENT-IMP: NORMAL
BUN SERPL-MCNC: 22 MG/DL (ref 7–30)
BUN SERPL-MCNC: 23 MG/DL (ref 7–30)
CA-I BLD-MCNC: 4.5 MG/DL (ref 4.4–5.2)
CALCIUM SERPL-MCNC: 7.6 MG/DL (ref 8.5–10.1)
CALCIUM SERPL-MCNC: 7.9 MG/DL (ref 8.5–10.1)
CHLORIDE SERPL-SCNC: 101 MMOL/L (ref 94–109)
CHLORIDE SERPL-SCNC: 99 MMOL/L (ref 94–109)
CO2 SERPL-SCNC: 28 MMOL/L (ref 20–32)
CO2 SERPL-SCNC: 29 MMOL/L (ref 20–32)
CREAT SERPL-MCNC: 0.79 MG/DL (ref 0.66–1.25)
CREAT SERPL-MCNC: 0.81 MG/DL (ref 0.66–1.25)
DIFFERENTIAL METHOD BLD: ABNORMAL
DIGOXIN SERPL-MCNC: 0.5 UG/L (ref 0.5–2)
EOSINOPHIL # BLD AUTO: 0.3 10E9/L (ref 0–0.7)
EOSINOPHIL NFR BLD AUTO: 3.4 %
ERYTHROCYTE [DISTWIDTH] IN BLOOD BY AUTOMATED COUNT: 18.8 % (ref 10–15)
GFR SERPL CREATININE-BSD FRML MDRD: ABNORMAL ML/MIN/1.7M2
GFR SERPL CREATININE-BSD FRML MDRD: ABNORMAL ML/MIN/1.7M2
GLUCOSE BLDC GLUCOMTR-MCNC: 162 MG/DL (ref 70–99)
GLUCOSE BLDC GLUCOMTR-MCNC: 173 MG/DL (ref 70–99)
GLUCOSE BLDC GLUCOMTR-MCNC: 204 MG/DL (ref 70–99)
GLUCOSE SERPL-MCNC: 158 MG/DL (ref 70–99)
GLUCOSE SERPL-MCNC: 178 MG/DL (ref 70–99)
HCO3 BLDV-SCNC: 22 MMOL/L (ref 21–28)
HCO3 BLDV-SCNC: 28 MMOL/L (ref 21–28)
HCO3 BLDV-SCNC: 29 MMOL/L (ref 21–28)
HCO3 BLDV-SCNC: 30 MMOL/L (ref 21–28)
HCO3 BLDV-SCNC: 31 MMOL/L (ref 21–28)
HCT VFR BLD AUTO: 25.7 % (ref 40–53)
HGB BLD-MCNC: 7.8 G/DL (ref 13.3–17.7)
IMM GRANULOCYTES # BLD: 0.1 10E9/L (ref 0–0.4)
IMM GRANULOCYTES NFR BLD: 0.8 %
INR PPP: 1.25 (ref 0.86–1.14)
LMWH PPP CHRO-ACNC: 0.21 IU/ML
LYMPHOCYTES # BLD AUTO: 1.5 10E9/L (ref 0.8–5.3)
LYMPHOCYTES NFR BLD AUTO: 20.6 %
MAGNESIUM SERPL-MCNC: 2.2 MG/DL (ref 1.6–2.3)
MAGNESIUM SERPL-MCNC: 2.5 MG/DL (ref 1.6–2.3)
MCH RBC QN AUTO: 30.2 PG (ref 26.5–33)
MCHC RBC AUTO-ENTMCNC: 30.4 G/DL (ref 31.5–36.5)
MCV RBC AUTO: 100 FL (ref 78–100)
MONOCYTES # BLD AUTO: 0.9 10E9/L (ref 0–1.3)
MONOCYTES NFR BLD AUTO: 11.6 %
NEUTROPHILS # BLD AUTO: 4.7 10E9/L (ref 1.6–8.3)
NEUTROPHILS NFR BLD AUTO: 62.9 %
NRBC # BLD AUTO: 0 10*3/UL
NRBC BLD AUTO-RTO: 0 /100
NUM BPU REQUESTED: 2
O2/TOTAL GAS SETTING VFR VENT: 21 %
O2/TOTAL GAS SETTING VFR VENT: 21 %
O2/TOTAL GAS SETTING VFR VENT: ABNORMAL %
O2/TOTAL GAS SETTING VFR VENT: ABNORMAL %
O2/TOTAL GAS SETTING VFR VENT: NORMAL %
OXYHGB MFR BLDV: 49 %
OXYHGB MFR BLDV: 52 %
OXYHGB MFR BLDV: 53 %
OXYHGB MFR BLDV: 57 %
OXYHGB MFR BLDV: 57 %
PCO2 BLDV: 34 MM HG (ref 40–50)
PCO2 BLDV: 45 MM HG (ref 40–50)
PCO2 BLDV: 47 MM HG (ref 40–50)
PH BLDV: 7.39 PH (ref 7.32–7.43)
PH BLDV: 7.41 PH (ref 7.32–7.43)
PH BLDV: 7.41 PH (ref 7.32–7.43)
PH BLDV: 7.42 PH (ref 7.32–7.43)
PH BLDV: 7.44 PH (ref 7.32–7.43)
PHOSPHATE SERPL-MCNC: 3.7 MG/DL (ref 2.5–4.5)
PLATELET # BLD AUTO: 370 10E9/L (ref 150–450)
PO2 BLDV: 31 MM HG (ref 25–47)
PO2 BLDV: 33 MM HG (ref 25–47)
PO2 BLDV: 33 MM HG (ref 25–47)
PO2 BLDV: 34 MM HG (ref 25–47)
PO2 BLDV: 38 MM HG (ref 25–47)
POTASSIUM SERPL-SCNC: 4.2 MMOL/L (ref 3.4–5.3)
POTASSIUM SERPL-SCNC: 4.3 MMOL/L (ref 3.4–5.3)
PROT SERPL-MCNC: 6.8 G/DL (ref 6.8–8.8)
RBC # BLD AUTO: 2.58 10E12/L (ref 4.4–5.9)
SODIUM SERPL-SCNC: 135 MMOL/L (ref 133–144)
SODIUM SERPL-SCNC: 135 MMOL/L (ref 133–144)
SPECIMEN EXP DATE BLD: NORMAL
WBC # BLD AUTO: 7.4 10E9/L (ref 4–11)

## 2017-05-07 PROCEDURE — 27210437 ZZH NUTRITION PRODUCT SEMIELEM INTERMED LITER

## 2017-05-07 PROCEDURE — 84100 ASSAY OF PHOSPHORUS: CPT | Performed by: INTERNAL MEDICINE

## 2017-05-07 PROCEDURE — 25000132 ZZH RX MED GY IP 250 OP 250 PS 637

## 2017-05-07 PROCEDURE — 25000132 ZZH RX MED GY IP 250 OP 250 PS 637: Performed by: SURGERY

## 2017-05-07 PROCEDURE — 25000132 ZZH RX MED GY IP 250 OP 250 PS 637: Performed by: STUDENT IN AN ORGANIZED HEALTH CARE EDUCATION/TRAINING PROGRAM

## 2017-05-07 PROCEDURE — 40000193 ZZH STATISTIC PT WARD VISIT: Performed by: REHABILITATION PRACTITIONER

## 2017-05-07 PROCEDURE — 85025 COMPLETE CBC W/AUTO DIFF WBC: CPT | Performed by: INTERNAL MEDICINE

## 2017-05-07 PROCEDURE — 80048 BASIC METABOLIC PNL TOTAL CA: CPT | Performed by: INTERNAL MEDICINE

## 2017-05-07 PROCEDURE — 83735 ASSAY OF MAGNESIUM: CPT | Performed by: INTERNAL MEDICINE

## 2017-05-07 PROCEDURE — 97116 GAIT TRAINING THERAPY: CPT | Mod: GP | Performed by: REHABILITATION PRACTITIONER

## 2017-05-07 PROCEDURE — 25000132 ZZH RX MED GY IP 250 OP 250 PS 637: Performed by: INTERNAL MEDICINE

## 2017-05-07 PROCEDURE — 86900 BLOOD TYPING SEROLOGIC ABO: CPT | Performed by: SURGERY

## 2017-05-07 PROCEDURE — 85610 PROTHROMBIN TIME: CPT | Performed by: INTERNAL MEDICINE

## 2017-05-07 PROCEDURE — 40000196 ZZH STATISTIC RAPCV CVP MONITORING

## 2017-05-07 PROCEDURE — 82330 ASSAY OF CALCIUM: CPT | Performed by: INTERNAL MEDICINE

## 2017-05-07 PROCEDURE — 20000004 ZZH R&B ICU UMMC

## 2017-05-07 PROCEDURE — 97112 NEUROMUSCULAR REEDUCATION: CPT | Mod: GP | Performed by: REHABILITATION PRACTITIONER

## 2017-05-07 PROCEDURE — 85520 HEPARIN ASSAY: CPT | Performed by: INTERNAL MEDICINE

## 2017-05-07 PROCEDURE — 86901 BLOOD TYPING SEROLOGIC RH(D): CPT | Performed by: SURGERY

## 2017-05-07 PROCEDURE — 00000146 ZZHCL STATISTIC GLUCOSE BY METER IP

## 2017-05-07 PROCEDURE — 40000275 ZZH STATISTIC RCP TIME EA 10 MIN

## 2017-05-07 PROCEDURE — 82805 BLOOD GASES W/O2 SATURATION: CPT | Performed by: INTERNAL MEDICINE

## 2017-05-07 PROCEDURE — 86850 RBC ANTIBODY SCREEN: CPT | Performed by: SURGERY

## 2017-05-07 PROCEDURE — 99233 SBSQ HOSP IP/OBS HIGH 50: CPT | Mod: GC | Performed by: INTERNAL MEDICINE

## 2017-05-07 PROCEDURE — 80162 ASSAY OF DIGOXIN TOTAL: CPT | Performed by: INTERNAL MEDICINE

## 2017-05-07 PROCEDURE — 80076 HEPATIC FUNCTION PANEL: CPT | Performed by: INTERNAL MEDICINE

## 2017-05-07 PROCEDURE — 71010 XR CHEST PORT 1 VW: CPT

## 2017-05-07 PROCEDURE — 97530 THERAPEUTIC ACTIVITIES: CPT | Mod: GP | Performed by: REHABILITATION PRACTITIONER

## 2017-05-07 RX ORDER — CEFAZOLIN SODIUM 2 G/100ML
2 INJECTION, SOLUTION INTRAVENOUS
Status: COMPLETED | OUTPATIENT
Start: 2017-05-07 | End: 2017-05-08

## 2017-05-07 RX ORDER — CEFAZOLIN SODIUM 1 G/3ML
1 INJECTION, POWDER, FOR SOLUTION INTRAMUSCULAR; INTRAVENOUS SEE ADMIN INSTRUCTIONS
Status: DISCONTINUED | OUTPATIENT
Start: 2017-05-07 | End: 2017-05-08 | Stop reason: HOSPADM

## 2017-05-07 RX ORDER — HEPARIN SODIUM 10000 [USP'U]/100ML
400 INJECTION, SOLUTION INTRAVENOUS CONTINUOUS
Status: DISCONTINUED | OUTPATIENT
Start: 2017-05-07 | End: 2017-05-08

## 2017-05-07 RX ORDER — HYDRALAZINE HYDROCHLORIDE 25 MG/1
25 TABLET, FILM COATED ORAL 4 TIMES DAILY
Status: DISCONTINUED | OUTPATIENT
Start: 2017-05-08 | End: 2017-05-08

## 2017-05-07 RX ORDER — LISINOPRIL 10 MG/1
10 TABLET ORAL 2 TIMES DAILY
Status: DISCONTINUED | OUTPATIENT
Start: 2017-05-08 | End: 2017-05-11

## 2017-05-07 RX ORDER — ACETAMINOPHEN 325 MG/1
975 TABLET ORAL ONCE
Status: COMPLETED | OUTPATIENT
Start: 2017-05-07 | End: 2017-05-07

## 2017-05-07 RX ORDER — WARFARIN SODIUM 5 MG/1
5 TABLET ORAL
Status: COMPLETED | OUTPATIENT
Start: 2017-05-07 | End: 2017-05-07

## 2017-05-07 RX ORDER — MUPIROCIN 20 MG/G
1 OINTMENT TOPICAL 2 TIMES DAILY
Status: COMPLETED | OUTPATIENT
Start: 2017-05-07 | End: 2017-05-08

## 2017-05-07 RX ADMIN — ASPIRIN 81 MG CHEWABLE TABLET 81 MG: 81 TABLET CHEWABLE at 07:53

## 2017-05-07 RX ADMIN — SALINE NASAL SPRAY 1 SPRAY: 1.5 SOLUTION NASAL at 20:18

## 2017-05-07 RX ADMIN — Medication: at 19:03

## 2017-05-07 RX ADMIN — CLOPIDOGREL 75 MG: 75 TABLET, FILM COATED ORAL at 07:53

## 2017-05-07 RX ADMIN — WARFARIN SODIUM 5 MG: 5 TABLET ORAL at 17:30

## 2017-05-07 RX ADMIN — HYDRALAZINE HYDROCHLORIDE 50 MG: 50 TABLET ORAL at 07:53

## 2017-05-07 RX ADMIN — BUMETANIDE 2 MG: 2 TABLET ORAL at 07:53

## 2017-05-07 RX ADMIN — INSULIN ASPART 3 UNITS: 100 INJECTION, SOLUTION INTRAVENOUS; SUBCUTANEOUS at 08:09

## 2017-05-07 RX ADMIN — ACETAMINOPHEN 975 MG: 325 TABLET, FILM COATED ORAL at 20:20

## 2017-05-07 RX ADMIN — ATORVASTATIN CALCIUM 40 MG: 40 TABLET, FILM COATED ORAL at 20:20

## 2017-05-07 RX ADMIN — DIGOXIN 125 MCG: 0.12 TABLET ORAL at 07:53

## 2017-05-07 RX ADMIN — LISINOPRIL 5 MG: 5 TABLET ORAL at 20:20

## 2017-05-07 RX ADMIN — Medication: at 13:41

## 2017-05-07 RX ADMIN — BUMETANIDE 2 MG: 2 TABLET ORAL at 16:26

## 2017-05-07 RX ADMIN — PANTOPRAZOLE SODIUM 40 MG: 40 TABLET, DELAYED RELEASE ORAL at 20:21

## 2017-05-07 RX ADMIN — MELATONIN TAB 3 MG 3 MG: 3 TAB at 21:37

## 2017-05-07 RX ADMIN — PANTOPRAZOLE SODIUM 40 MG: 40 TABLET, DELAYED RELEASE ORAL at 07:55

## 2017-05-07 RX ADMIN — HYDRALAZINE HYDROCHLORIDE 50 MG: 50 TABLET ORAL at 20:20

## 2017-05-07 RX ADMIN — MULTIVIT AND MINERALS-FERROUS GLUCONATE 9 MG IRON/15 ML ORAL LIQUID 15 ML: at 07:53

## 2017-05-07 RX ADMIN — INSULIN GLARGINE 25 UNITS: 100 INJECTION, SOLUTION SUBCUTANEOUS at 07:55

## 2017-05-07 RX ADMIN — INSULIN ASPART 2 UNITS: 100 INJECTION, SOLUTION INTRAVENOUS; SUBCUTANEOUS at 12:03

## 2017-05-07 RX ADMIN — HYDRALAZINE HYDROCHLORIDE 50 MG: 50 TABLET ORAL at 12:02

## 2017-05-07 RX ADMIN — MUPIROCIN 1 G: 20 OINTMENT TOPICAL at 20:11

## 2017-05-07 RX ADMIN — OXYCODONE HYDROCHLORIDE 10 MG: 5 TABLET ORAL at 21:37

## 2017-05-07 RX ADMIN — INSULIN ASPART 1 UNITS: 100 INJECTION, SOLUTION INTRAVENOUS; SUBCUTANEOUS at 16:27

## 2017-05-07 RX ADMIN — HYDRALAZINE HYDROCHLORIDE 50 MG: 50 TABLET ORAL at 16:26

## 2017-05-07 RX ADMIN — LISINOPRIL 5 MG: 5 TABLET ORAL at 07:53

## 2017-05-07 NOTE — PLAN OF CARE
Problem: Goal Outcome Summary  Goal: Goal Outcome Summary  1) pt will be hemodynamically stable  2) pt will tolerate weaning of IABP  3) pain will be adequately controlled     Session canceled.  not available this date. Will reschedule for tomorrow as appropriate.

## 2017-05-07 NOTE — PROGRESS NOTES
Interval Note  Plan for removal of R subclavian artery graft 5/8. Consented via telephone interpretor (Edgardo). NPO at midnight. Change to straight rate heparin gtt @ 400 units/hr until called for surgery - add on so unsure what time OR.    Pablo Edwards MD (PGY-3)  General Surgery  Pager #[phone]

## 2017-05-07 NOTE — PROGRESS NOTES
"[facility]  Cardiology II Service / Advanced Heart Failure  4/30/2017    Interval History:   No acute events overnight, notes reviewed.  Sleeping better.  CVP up to 15 from 13.  Good UOP but not net negative much.  No SOB, chest pain.     Examination:  BP 99/58  Temp 97.5  F (36.4  C) (Oral)  Resp 12  Ht 1.575 m (5' 2.01\")  Wt 59.3 kg (130 lb 11.7 oz)  SpO2 98%  BMI 23.91 kg/m2     GEN: NAD, lying in bed  NECK: Full ROM, no JVP appreciated  RESP: Chest clear, no wheeze or crackle  CV: RRR, no m/c/r  ABD: Soft, nontender to palpation, no HSM, +BS  EXT: WWP, no edema   SKIN: Normal skin turgor, no rash     Data:    Intake/Output Summary (Last 24 hours) at 05/07/17 0707  Last data filed at 05/07/17 0700   Gross per 24 hour   Intake             1892 ml   Output             2215 ml   Net             -323 ml     Cr. 0.79  Dig level 0.5    CVP 15    Imaging Studies:    CTA heaf and neck  Impression:   1. Head CTA demonstrates at least moderate stenosis of both cavernous  internal carotid arteries. Diffuse mild to moderate narrowing of the  basilar artery. Additional areas of mild bilateral M2 segment  narrowing, presumably also related to atherosclerosis.  2. No aneurysm of the major intracranial arteries.   3. Neck CTA demonstrates no carotid artery stenosis. Short segment  mild narrowing of the proximal right V2 segment.  4. No change in probable bilateral cerebral white matter infarcts. No  acute intracranial hemorrhage    Echo 3/27  The visual ejection fraction is estimated at 30-35%.  There is extensive inferior, inferoseptal, and inferolateral wall akinesis.  Basal/mid lateral wall hypokinesis  There is moderate to severe septal hypokinesis.  Septal wall motion abnormality may reflect pacemaker activation.  There is a catheter/pacemaker lead seen in the right ventricle.  The right ventricle is mildly dilated.  Severely decreased right ventricular systolic function  There " is no pericardial effusion.  The rhythm was paced.  Compared to the prior study, the LVEF appears somewhat decreased. Septal wall  motion abnormality larger- may reflect, in part, that patient in sinus tach on  prior study, and ventricular paced now. No hemodynamically significant  valvular abnormalities on 2D or color flow imaging     CT chest/abdomen 3/27   IMPRESSION:   1. Small mediastinal hemorrhage, small bilateral pleural effusions  which may be hemorrhagic. Small intraperitoneal hemorrhage. Minimal  pericardial hemorrhage.  2. Minimal pneumoperitoneum in the left paracolic gutter, of unknown  significance. No pneumatosis as clinically questioned.  3. IABP slightly low in position.  4. Bilateral pulmonary dependent consolidations represent compression  atelectasis, however differentials include aspiration.     Cath 3/26 [facility]  SUMMARY:   --Inferior STEMI w/ late presentation. Culprit dictated by complete  heart block, and cardiogenic shock. Emergent echo in the Cath Lab also  shows significant RV dysfunction.  --Three vessel coronary artery disease with diffuse coronary disease  out to the distal vessels  --Successful POBA of the prox and mid-RCA. Stent was not placed, in  anticipation he may need to be considered for bypass surgery in the  near future  --Insertion of a temporary pacemaker for bradycardia (AVB) and  hypotension  --Insertion of a IABP  --Upper GI bleed consistent with [facility] 3/27     CT head 3/28: normal      Echo 3/29  Interpretation Summary  Limited study. Ischemic CM. Moderately (EF 30-35%) reduced left ventricular  function is present. Traced at 32%.  Posterior wall akinesis is present. Lateral wall akinesis is present. Inferior  wall akinesis is present.  Right ventricular function, chamber size, wall motion, and thickness are  normal.  Dilation of the inferior vena cava is present with abnormal respiratory  variation in diameter.     Compared to  prior study, RV fxn is improved.     Echo 3/31  Left Ventricle  The Ejection Fraction is estimated at 50-55%. Inferior,posterior,lateral,basal  septal wall akinesis as reported before. No change.     CORONARY ANGIOGRAM 4/1:   1. Both coronary arteries arise from their respective cusps.  2. Right dominant.  3. LM has mild luminal irregularities.   4. LAD supplies the apex along with the RCA (type 2 LAD) and gives rise to septal perforators and a large and branching D1. There are widely patent stents extending from the proximal to mid LAD. The dLAD has mild to moderate disease to 30% stenosis. The D1 is jailed by the LAD stents, but has EBER 3 flow with disease to 30% stenosis.   5. The small ramus is no longer present.  6. LCX is occluded at the ostium.   7. RCA gives rise to PL branches and supplies PDA. There are widely patent stents extending from the proximal to mid RCA. The remainder of the mRCA has disease to 50% stenosis and the dRCA has disease to 10% stenosis. The RPDA has a widely patent stent and the remainder of the artery has mild disease to 10% stenosis. The RPLA has small vessels with diffuse disease including a distal branch occlusion. The RPLA supplies some small collateral branches to the dLCx.      COMPLICATIONS:  1. None      SUMMARY:   1. Cardiogenic shock following an inferior STEMI.  2. The LCx re-occlusion is not surprising as the recently revascularized LCx  had poor outflow and the revascularized portion did not supply a large territory (limited to no flow was present distal to the stented segment immediately following stenting). Repeat revascularization of the LCx would result in the same outcome of repeat closure and also risk embolizing thrombus from the LCx into the LAD so no treatment of the occluded LCx was performed.   3. Three vessel coronary artery disease with patent LAD and RCA stents and an occluded LCx.     Echo 4/3  Left ventricular size is normal.  The Ejection Fraction is  estimated at 35-40%.  Inferior wall akinesis is present.  Lateral wall akinesis is present.  Posterior wall akinesis is present.  Right ventricular function, chamber size, wall motion, and thickness are  normal.  Moderate mitral insufficiency is present.  Moderate tricuspid insufficiency is present.  Right ventricular systolic pressure is 47mmHg above the right atrial pressure.  The inferior vena cava is normal.     Echo 4/5  Moderately (EF 35-40%) reduced left ventricular function is present. Traced at  40%.  Moderate mitral insufficiency is present.  The cause of the mitral insufficiency appears to be restricted posterior  leaflet from posterior MI. No mitral leaflet pathology seen.  Dilation of the inferior vena cava is present with abnormal respiratory  variation in diameter.     CT abd without contrast 4/4  IMPRESSION:   1. No evidence of ischemic bowel, obstruction, or intra-abdominal  infection.  2. Bibasilar patchy pulmonary opacities likely represent combination  of aspiration and atelectasis.  3. Small amount of simple free fluid within the lower abdomen.  4. Small left retroperitoneal hematoma along the iliac vasculature  likely postprocedural.   5. Unchanged size of bilateral pleural effusions, which now consist of  simple fluid.    Assessment:  51 year old man presented with cardiogenic shock from inferior STEMI s/p RCA aspiration thrombectomy and POBA on 3/26 at [facility] s/p IABP and initiation of Dopamine, also during procedure, CHB s/p temp pacer, emesis/hematmesis and altered mental status s/p intubation transferred here for consideration of mechanical support on 3/27. Had PCI to RCA, LAD and LCx (on ASA and plavix) started on epinephrine in addition to dopamine, post procedure developed distributive shock picture from infection (aspiration pneumonia? Sputum cx growing staph aureus, on vanco and zosyn) vs post-MI SIRS response. Developed cardiogenic shock now s/p IABP, removed 5/6/17.      Plan  Today:  - Transfer to floor  - Investigate anticoagulation/antiplatelet need (DVT, Mitraclip, CVA)    # Cardiogenic shock   # CAD  # Ischemic cardiomyopathy  2/2 underlying 3v CAD-has had high SVR and low CI, complicated by ischemic MR.  ICM to inferior wall STEMI s/p 7 FEMI to LAD, LCx, RCA on 3/27. LCx was  and is re-occluded on angio 4/1.  Cortisol normal.  MitraClip placed on 5/1, IABP weaned post procedure.  Discontinued IABP on 5/5.    - Lisinopril 5 mg BID  - Hydralazine 50mg QID  - Digoxin 125mcg  - Bumex 2mg BID + PRN  - Subclavian line will need to me removed in the OR  - Needs plavix 1 year    # h/o CVA  CVA presenting with left hand weakness and pain.  CTA head/neck showed no hemorrhage but new areas of low attenuation in b/l hemispheres.  - Neuro consulted. Continuing ASA, Plavix and statin  - Investigating anticoagulation/antiplatelet need (DVT, Mitraclip, CVA)  - MAP goal > 70    # Hypoxic respiratory failure  Intubated in the setting of worsening hypoxia on 4/25, extubated 4/28.  Thought to be aspiration PNA vs PE vs volume overload.  Treated with broad spectrum abx for two weeks starting 4/19.    - Continue to monitor volume status  - Diuresing to CVP < 12, currently on 2L NC    # Urinary tract infection  UA 4/15 with cloudy urine, 50 WBCs, many bacteria, no nitrates. UCx grew E coli - treated with CFTX (sensitive).  Fever on 4/20 and single spike 100.2 overnight with blood cultures in process (no growth thus far), PICC and SGC removed, started on Vanc and pip/tazo, treatment completed on 5/1.   - CTM WBC and fever curve    # T2DM:   HA1C 7.5 on admission.  - Insulin gtt per nurising protocol with hypoglycemia precautions.   - SQ Lantus       # Lower GIB  Had several maroon BMs since 4/11, Hb overall dropped by a 1.5 grams or so, heparin inf was held and GI consulted.  Colonoscopy 4/17 showed a rectal ulcer but no intervenable lesions. Resumed heparin.  - Trend hgb  - Protonix 40 PO BID    # H/o  DVT  Non-occlusive clot in left IJ and left common femoral vein.   - Heparin gtt low intensity  - Start warfarin      # Hypernatremia  - Resolved. Stopped his D5W, only getting FWF's.    FEN: feeding tube, and dysphagia level II  Code: FULL  PPx: PPI 40mg BID, senna PRN, warfarin/heparin  Dispo: pending stability    LINES:  - LUE PICC   4/20  - Chavis catheter  5/3  - Lt IJ                            4/29    Plan of care discussed with Dr. Jamison Camilo MD, MPH  PGY-3, Internal Medicine   [phone]

## 2017-05-07 NOTE — PLAN OF CARE
Problem: Goal Outcome Summary  Goal: Goal Outcome Summary  1) pt will be hemodynamically stable  2) pt will tolerate weaning of IABP  3) pain will be adequately controlled     OT/4E:  present and patient tolerated afternoon treatment session well with good motivation to participate. Minimal assistance x2 required for stand pivot transfer from bed to chair with verbal and tactile cues required for hand/foot placement. Patient remains limited by (L) upper extremity strength and incoordination as well as discomfort from coccyx wound with prolonged sitting.     Discharge recommendation is ARU when medically appropriate to progress functional independence.

## 2017-05-07 NOTE — PROGRESS NOTES
"CLINICAL NUTRITION SERVICES     Nutrition Prescription    RECOMMENDATIONS FOR MDs/PROVIDERS TO ORDER:  1. Would not advise transitioning off TFs at this time. No meals ordered since breakfast on Friday 5/5 am. Await kcal counts to determine appropriate timing due to ongoing malnutrition and no change in stage II coccyx ulcer as of 5/1.  2. Once pt is consuming at least 1027 kcals and 50 g protein daily on average, then rec discontinue TFs.       Recommendations already ordered by Registered Dietitian (RD):  Oral supplements    Future/Additional Recommendations:  1. For all recommendations, see prior nutrition notes.  2. Once pt is consuming 25% of meals, then rec transition to cycled Peptamen 1.5 TFs at 65 mL/hr x 12 hrs which provides ~75% of kcal and protein needs.     Received Provider order for \"transition off of tube feeds.\"    INTERVENTIONS:  Implementation:  Medical food supplement therapy: Ordered Ensure Plus, vanilla at 10:00 and chocolate at 14:00.    Follow up/Monitoring:    Miladis Sinclair, MS, RD, LD, CNSC   Saturday/Sunday Pgr:  [phone]      RD will continue to follow pt. 4E RD pgr: [phone]          "

## 2017-05-07 NOTE — PLAN OF CARE
Problem: Goal Outcome Summary  Goal: Goal Outcome Summary  1) pt will be hemodynamically stable  2) pt will tolerate weaning of IABP  3) pain will be adequately controlled     OT 4E: Cancel - no  available for OT session on this date. Will reschedule per POC.

## 2017-05-07 NOTE — PLAN OF CARE
Problem: Goal Outcome Summary  Goal: Goal Outcome Summary  1) pt will be hemodynamically stable  2) pt will tolerate weaning of IABP  3) pain will be adequately controlled     Outcome: Improving  VSS, afebrile, pt denies any pain or SOB on 2L o2 via NC. CVPs today were 22, 15, 14. -1100. SVO2 52-57. Pt's dawkins was removed, he didn't void for 4hrs, bladder scan was 422cc. Pt was straight cathed with 525cc out. Pt is going to the OR tomorrow to have a sheath removed where the IABP was. Time is not set. Pt is to be NPO at midnight and heparin stopped once he is called for the OR. Consent was signed. Will continue to monitor and update as needed.

## 2017-05-07 NOTE — PLAN OF CARE
Problem: Goal Outcome Summary  Goal: Goal Outcome Summary  1) pt will be hemodynamically stable  2) pt will tolerate weaning of IABP  3) pain will be adequately controlled     Outcome: No Change  No acute events overnight.      CVPs elevated 16--->17--->15, see flowsheet for other hemodynamic values.  MAPs intermittently softer to 60s, otherwise VSS.  Adequate UOP per dawkins.      Pt restless overnight and again reported not sleeping very well.     Heparin drip infusing, heparin 10a therapeutic this AM.

## 2017-05-07 NOTE — PLAN OF CARE
Problem: Goal Outcome Summary  Goal: Goal Outcome Summary  1) pt will be hemodynamically stable  2) pt will tolerate weaning of IABP  3) pain will be adequately controlled     Outcome: Therapy, progress toward functional goals as expected  4E PT - ARU to progress transfer and gait.  Pt's LEs buckled twice with gait in room with min A and lift pants/ceiling lift to correct.  Improved stability with stance and gait when closer to walker with hips & trunk extended to neutral and fwd gaze.  Continues with c/o moderate to severe dizziness with activity, and c/o coccyx discomfort with any sitting, but less painful with upright sitting rather than his habitual slouched posture with wife indicating understanding of education and reinforcing this with pt.

## 2017-05-08 ENCOUNTER — APPOINTMENT (OUTPATIENT)
Dept: PHYSICAL THERAPY | Facility: CLINIC | Age: 52
DRG: 228 | End: 2017-05-08
Attending: INTERNAL MEDICINE
Payer: COMMERCIAL

## 2017-05-08 ENCOUNTER — APPOINTMENT (OUTPATIENT)
Dept: GENERAL RADIOLOGY | Facility: CLINIC | Age: 52
DRG: 228 | End: 2017-05-08
Attending: PHYSICIAN ASSISTANT
Payer: COMMERCIAL

## 2017-05-08 ENCOUNTER — ANESTHESIA (OUTPATIENT)
Dept: SURGERY | Facility: CLINIC | Age: 52
DRG: 228 | End: 2017-05-08
Payer: COMMERCIAL

## 2017-05-08 ENCOUNTER — ANESTHESIA EVENT (OUTPATIENT)
Dept: SURGERY | Facility: CLINIC | Age: 52
DRG: 228 | End: 2017-05-08
Payer: COMMERCIAL

## 2017-05-08 ENCOUNTER — APPOINTMENT (OUTPATIENT)
Dept: GENERAL RADIOLOGY | Facility: CLINIC | Age: 52
DRG: 228 | End: 2017-05-08
Attending: INTERNAL MEDICINE
Payer: COMMERCIAL

## 2017-05-08 ENCOUNTER — APPOINTMENT (OUTPATIENT)
Dept: OCCUPATIONAL THERAPY | Facility: CLINIC | Age: 52
DRG: 228 | End: 2017-05-08
Attending: INTERNAL MEDICINE
Payer: COMMERCIAL

## 2017-05-08 DIAGNOSIS — I34.0 MITRAL VALVE INSUFFICIENCY, UNSPECIFIED ETIOLOGY: Primary | ICD-10-CM

## 2017-05-08 LAB
ALBUMIN SERPL-MCNC: 2.4 G/DL (ref 3.4–5)
ALP SERPL-CCNC: 64 U/L (ref 40–150)
ALT SERPL W P-5'-P-CCNC: 30 U/L (ref 0–70)
ANION GAP SERPL CALCULATED.3IONS-SCNC: 5 MMOL/L (ref 3–14)
ANION GAP SERPL CALCULATED.3IONS-SCNC: 6 MMOL/L (ref 3–14)
APTT PPP: 46 SEC (ref 22–37)
AST SERPL W P-5'-P-CCNC: 25 U/L (ref 0–45)
BASE EXCESS BLDV CALC-SCNC: 2.5 MMOL/L
BASE EXCESS BLDV CALC-SCNC: 3.9 MMOL/L
BASE EXCESS BLDV CALC-SCNC: 5.2 MMOL/L
BASE EXCESS BLDV CALC-SCNC: 6.1 MMOL/L
BASE EXCESS BLDV CALC-SCNC: 6.6 MMOL/L
BASOPHILS # BLD AUTO: 0.1 10E9/L (ref 0–0.2)
BASOPHILS NFR BLD AUTO: 0.9 %
BILIRUB DIRECT SERPL-MCNC: 0.1 MG/DL (ref 0–0.2)
BILIRUB SERPL-MCNC: 0.3 MG/DL (ref 0.2–1.3)
BLD PROD TYP BPU: NORMAL
BLD UNIT ID BPU: 0
BLOOD PRODUCT CODE: NORMAL
BPU ID: NORMAL
BUN SERPL-MCNC: 20 MG/DL (ref 7–30)
BUN SERPL-MCNC: 23 MG/DL (ref 7–30)
CA-I BLD-MCNC: 4.5 MG/DL (ref 4.4–5.2)
CA-I BLD-SCNC: 4.4 MG/DL (ref 4.4–5.2)
CALCIUM SERPL-MCNC: 7.9 MG/DL (ref 8.5–10.1)
CALCIUM SERPL-MCNC: 7.9 MG/DL (ref 8.5–10.1)
CHLORIDE SERPL-SCNC: 100 MMOL/L (ref 94–109)
CHLORIDE SERPL-SCNC: 98 MMOL/L (ref 94–109)
CO2 BLD-SCNC: 26 MMOL/L (ref 21–28)
CO2 SERPL-SCNC: 30 MMOL/L (ref 20–32)
CO2 SERPL-SCNC: 30 MMOL/L (ref 20–32)
CREAT SERPL-MCNC: 0.77 MG/DL (ref 0.66–1.25)
CREAT SERPL-MCNC: 0.84 MG/DL (ref 0.66–1.25)
DIFFERENTIAL METHOD BLD: ABNORMAL
EOSINOPHIL # BLD AUTO: 0.3 10E9/L (ref 0–0.7)
EOSINOPHIL NFR BLD AUTO: 4.3 %
ERYTHROCYTE [DISTWIDTH] IN BLOOD BY AUTOMATED COUNT: 18.5 % (ref 10–15)
GFR SERPL CREATININE-BSD FRML MDRD: ABNORMAL ML/MIN/1.7M2
GFR SERPL CREATININE-BSD FRML MDRD: ABNORMAL ML/MIN/1.7M2
GLUCOSE BLD-MCNC: 143 MG/DL (ref 70–99)
GLUCOSE BLDC GLUCOMTR-MCNC: 100 MG/DL (ref 70–99)
GLUCOSE BLDC GLUCOMTR-MCNC: 139 MG/DL (ref 70–99)
GLUCOSE BLDC GLUCOMTR-MCNC: 142 MG/DL (ref 70–99)
GLUCOSE BLDC GLUCOMTR-MCNC: 162 MG/DL (ref 70–99)
GLUCOSE BLDC GLUCOMTR-MCNC: 95 MG/DL (ref 70–99)
GLUCOSE SERPL-MCNC: 120 MG/DL (ref 70–99)
GLUCOSE SERPL-MCNC: 90 MG/DL (ref 70–99)
HCO3 BLDV-SCNC: 27 MMOL/L (ref 21–28)
HCO3 BLDV-SCNC: 30 MMOL/L (ref 21–28)
HCO3 BLDV-SCNC: 30 MMOL/L (ref 21–28)
HCO3 BLDV-SCNC: 31 MMOL/L (ref 21–28)
HCO3 BLDV-SCNC: 31 MMOL/L (ref 21–28)
HCT VFR BLD AUTO: 25.3 % (ref 40–53)
HCT VFR BLD CALC: 29 %PCV (ref 40–53)
HGB BLD CALC-MCNC: 9.9 G/DL (ref 13.3–17.7)
HGB BLD-MCNC: 7.6 G/DL (ref 13.3–17.7)
IMM GRANULOCYTES # BLD: 0 10E9/L (ref 0–0.4)
IMM GRANULOCYTES NFR BLD: 0.4 %
INR PPP: 2.22 (ref 0.86–1.14)
LYMPHOCYTES # BLD AUTO: 1.8 10E9/L (ref 0.8–5.3)
LYMPHOCYTES NFR BLD AUTO: 26.4 %
MAGNESIUM SERPL-MCNC: 2 MG/DL (ref 1.6–2.3)
MAGNESIUM SERPL-MCNC: 2.4 MG/DL (ref 1.6–2.3)
MCH RBC QN AUTO: 29.6 PG (ref 26.5–33)
MCHC RBC AUTO-ENTMCNC: 30 G/DL (ref 31.5–36.5)
MCV RBC AUTO: 98 FL (ref 78–100)
MONOCYTES # BLD AUTO: 0.8 10E9/L (ref 0–1.3)
MONOCYTES NFR BLD AUTO: 12.2 %
NEUTROPHILS # BLD AUTO: 3.8 10E9/L (ref 1.6–8.3)
NEUTROPHILS NFR BLD AUTO: 55.8 %
NRBC # BLD AUTO: 0 10*3/UL
NRBC BLD AUTO-RTO: 0 /100
NUM BPU REQUESTED: 2
NUM BPU REQUESTED: 2
O2/TOTAL GAS SETTING VFR VENT: 21 %
O2/TOTAL GAS SETTING VFR VENT: 21 %
O2/TOTAL GAS SETTING VFR VENT: ABNORMAL %
OXYHGB MFR BLDV: 48 %
OXYHGB MFR BLDV: 50 %
OXYHGB MFR BLDV: 51 %
OXYHGB MFR BLDV: 55 %
OXYHGB MFR BLDV: 62 %
PCO2 BLD: 40 MM HG (ref 35–45)
PCO2 BLDV: 41 MM HG (ref 40–50)
PCO2 BLDV: 48 MM HG (ref 40–50)
PCO2 BLDV: 48 MM HG (ref 40–50)
PCO2 BLDV: 50 MM HG (ref 40–50)
PCO2 BLDV: 51 MM HG (ref 40–50)
PH BLD: 7.43 PH (ref 7.35–7.45)
PH BLDV: 7.37 PH (ref 7.32–7.43)
PH BLDV: 7.41 PH (ref 7.32–7.43)
PH BLDV: 7.41 PH (ref 7.32–7.43)
PH BLDV: 7.43 PH (ref 7.32–7.43)
PH BLDV: 7.43 PH (ref 7.32–7.43)
PHOSPHATE SERPL-MCNC: 4.1 MG/DL (ref 2.5–4.5)
PLATELET # BLD AUTO: 368 10E9/L (ref 150–450)
PO2 BLD: 162 MM HG (ref 80–105)
PO2 BLDV: 31 MM HG (ref 25–47)
PO2 BLDV: 32 MM HG (ref 25–47)
PO2 BLDV: 32 MM HG (ref 25–47)
PO2 BLDV: 34 MM HG (ref 25–47)
PO2 BLDV: 39 MM HG (ref 25–47)
POTASSIUM BLD-SCNC: 3.9 MMOL/L (ref 3.4–5.3)
POTASSIUM SERPL-SCNC: 3.9 MMOL/L (ref 3.4–5.3)
POTASSIUM SERPL-SCNC: 4 MMOL/L (ref 3.4–5.3)
PROT SERPL-MCNC: 6.7 G/DL (ref 6.8–8.8)
RBC # BLD AUTO: 2.57 10E12/L (ref 4.4–5.9)
SAO2 % BLDA FROM PO2: 99 % (ref 92–100)
SODIUM BLD-SCNC: 135 MMOL/L (ref 133–144)
SODIUM SERPL-SCNC: 134 MMOL/L (ref 133–144)
SODIUM SERPL-SCNC: 134 MMOL/L (ref 133–144)
TRANSFUSION STATUS PATIENT QL: NORMAL
WBC # BLD AUTO: 6.8 10E9/L (ref 4–11)

## 2017-05-08 PROCEDURE — 71010 XR CHEST PORT 1 VW: CPT

## 2017-05-08 PROCEDURE — 25000125 ZZHC RX 250: Performed by: THORACIC SURGERY (CARDIOTHORACIC VASCULAR SURGERY)

## 2017-05-08 PROCEDURE — 85014 HEMATOCRIT: CPT

## 2017-05-08 PROCEDURE — 82947 ASSAY GLUCOSE BLOOD QUANT: CPT

## 2017-05-08 PROCEDURE — 97110 THERAPEUTIC EXERCISES: CPT | Mod: GO

## 2017-05-08 PROCEDURE — 40000344 ZZHCL STATISTIC THAWING COMPONENT: Performed by: STUDENT IN AN ORGANIZED HEALTH CARE EDUCATION/TRAINING PROGRAM

## 2017-05-08 PROCEDURE — 80076 HEPATIC FUNCTION PANEL: CPT | Performed by: INTERNAL MEDICINE

## 2017-05-08 PROCEDURE — P9016 RBC LEUKOCYTES REDUCED: HCPCS | Performed by: INTERNAL MEDICINE

## 2017-05-08 PROCEDURE — 25000128 H RX IP 250 OP 636: Performed by: NURSE ANESTHETIST, CERTIFIED REGISTERED

## 2017-05-08 PROCEDURE — 82805 BLOOD GASES W/O2 SATURATION: CPT | Performed by: INTERNAL MEDICINE

## 2017-05-08 PROCEDURE — 84100 ASSAY OF PHOSPHORUS: CPT | Performed by: INTERNAL MEDICINE

## 2017-05-08 PROCEDURE — 40000133 ZZH STATISTIC OT WARD VISIT

## 2017-05-08 PROCEDURE — 25000132 ZZH RX MED GY IP 250 OP 250 PS 637: Performed by: STUDENT IN AN ORGANIZED HEALTH CARE EDUCATION/TRAINING PROGRAM

## 2017-05-08 PROCEDURE — 83735 ASSAY OF MAGNESIUM: CPT | Performed by: INTERNAL MEDICINE

## 2017-05-08 PROCEDURE — 71000015 ZZH RECOVERY PHASE 1 LEVEL 2 EA ADDTL HR: Performed by: THORACIC SURGERY (CARDIOTHORACIC VASCULAR SURGERY)

## 2017-05-08 PROCEDURE — 27210995 ZZH RX 272: Performed by: THORACIC SURGERY (CARDIOTHORACIC VASCULAR SURGERY)

## 2017-05-08 PROCEDURE — 85025 COMPLETE CBC W/AUTO DIFF WBC: CPT | Performed by: INTERNAL MEDICINE

## 2017-05-08 PROCEDURE — 85730 THROMBOPLASTIN TIME PARTIAL: CPT | Performed by: INTERNAL MEDICINE

## 2017-05-08 PROCEDURE — 37000008 ZZH ANESTHESIA TECHNICAL FEE, 1ST 30 MIN: Performed by: THORACIC SURGERY (CARDIOTHORACIC VASCULAR SURGERY)

## 2017-05-08 PROCEDURE — 25000125 ZZHC RX 250: Performed by: NURSE ANESTHETIST, CERTIFIED REGISTERED

## 2017-05-08 PROCEDURE — 71010 XR CHEST PORT 1 VW: CPT | Mod: 77

## 2017-05-08 PROCEDURE — 25000125 ZZHC RX 250: Performed by: ANESTHESIOLOGY

## 2017-05-08 PROCEDURE — 88300 SURGICAL PATH GROSS: CPT | Performed by: THORACIC SURGERY (CARDIOTHORACIC VASCULAR SURGERY)

## 2017-05-08 PROCEDURE — 84295 ASSAY OF SERUM SODIUM: CPT

## 2017-05-08 PROCEDURE — 82803 BLOOD GASES ANY COMBINATION: CPT

## 2017-05-08 PROCEDURE — 25000128 H RX IP 250 OP 636: Performed by: PHYSICIAN ASSISTANT

## 2017-05-08 PROCEDURE — 97110 THERAPEUTIC EXERCISES: CPT | Mod: GP

## 2017-05-08 PROCEDURE — 25000125 ZZHC RX 250: Performed by: STUDENT IN AN ORGANIZED HEALTH CARE EDUCATION/TRAINING PROGRAM

## 2017-05-08 PROCEDURE — 25000128 H RX IP 250 OP 636: Performed by: THORACIC SURGERY (CARDIOTHORACIC VASCULAR SURGERY)

## 2017-05-08 PROCEDURE — 00000146 ZZHCL STATISTIC GLUCOSE BY METER IP

## 2017-05-08 PROCEDURE — 25800025 ZZH RX 258: Performed by: INTERNAL MEDICINE

## 2017-05-08 PROCEDURE — 25800025 ZZH RX 258: Performed by: NURSE ANESTHETIST, CERTIFIED REGISTERED

## 2017-05-08 PROCEDURE — 25000125 ZZHC RX 250: Performed by: PHYSICIAN ASSISTANT

## 2017-05-08 PROCEDURE — 85610 PROTHROMBIN TIME: CPT | Performed by: INTERNAL MEDICINE

## 2017-05-08 PROCEDURE — 80048 BASIC METABOLIC PNL TOTAL CA: CPT | Performed by: INTERNAL MEDICINE

## 2017-05-08 PROCEDURE — 37000009 ZZH ANESTHESIA TECHNICAL FEE, EACH ADDTL 15 MIN: Performed by: THORACIC SURGERY (CARDIOTHORACIC VASCULAR SURGERY)

## 2017-05-08 PROCEDURE — 03PY07Z REMOVAL OF AUTOLOGOUS TISSUE SUBSTITUTE FROM UPPER ARTERY, OPEN APPROACH: ICD-10-PCS | Performed by: THORACIC SURGERY (CARDIOTHORACIC VASCULAR SURGERY)

## 2017-05-08 PROCEDURE — 25000128 H RX IP 250 OP 636: Performed by: SURGERY

## 2017-05-08 PROCEDURE — 27210437 ZZH NUTRITION PRODUCT SEMIELEM INTERMED LITER

## 2017-05-08 PROCEDURE — 84132 ASSAY OF SERUM POTASSIUM: CPT

## 2017-05-08 PROCEDURE — 27210794 ZZH OR GENERAL SUPPLY STERILE: Performed by: THORACIC SURGERY (CARDIOTHORACIC VASCULAR SURGERY)

## 2017-05-08 PROCEDURE — 40000275 ZZH STATISTIC RCP TIME EA 10 MIN

## 2017-05-08 PROCEDURE — 25000132 ZZH RX MED GY IP 250 OP 250 PS 637: Performed by: INTERNAL MEDICINE

## 2017-05-08 PROCEDURE — 40000193 ZZH STATISTIC PT WARD VISIT

## 2017-05-08 PROCEDURE — 97530 THERAPEUTIC ACTIVITIES: CPT | Mod: GO

## 2017-05-08 PROCEDURE — 82330 ASSAY OF CALCIUM: CPT | Performed by: INTERNAL MEDICINE

## 2017-05-08 PROCEDURE — 40000014 ZZH STATISTIC ARTERIAL MONITORING DAILY

## 2017-05-08 PROCEDURE — C9399 UNCLASSIFIED DRUGS OR BIOLOG: HCPCS | Performed by: NURSE ANESTHETIST, CERTIFIED REGISTERED

## 2017-05-08 PROCEDURE — 40000196 ZZH STATISTIC RAPCV CVP MONITORING

## 2017-05-08 PROCEDURE — 20000004 ZZH R&B ICU UMMC

## 2017-05-08 PROCEDURE — 36000088 ZZH SURGERY LEVEL 8 EA 15 ADDTL MIN - UMMC: Performed by: THORACIC SURGERY (CARDIOTHORACIC VASCULAR SURGERY)

## 2017-05-08 PROCEDURE — 99233 SBSQ HOSP IP/OBS HIGH 50: CPT | Mod: GC | Performed by: INTERNAL MEDICINE

## 2017-05-08 PROCEDURE — 82330 ASSAY OF CALCIUM: CPT

## 2017-05-08 PROCEDURE — 71000014 ZZH RECOVERY PHASE 1 LEVEL 2 FIRST HR: Performed by: THORACIC SURGERY (CARDIOTHORACIC VASCULAR SURGERY)

## 2017-05-08 PROCEDURE — 25000566 ZZH SEVOFLURANE, EA 15 MIN: Performed by: THORACIC SURGERY (CARDIOTHORACIC VASCULAR SURGERY)

## 2017-05-08 PROCEDURE — 36000086 ZZH SURGERY LEVEL 8 1ST 30 MIN UMMC: Performed by: THORACIC SURGERY (CARDIOTHORACIC VASCULAR SURGERY)

## 2017-05-08 RX ORDER — ONDANSETRON 4 MG/1
4 TABLET, ORALLY DISINTEGRATING ORAL EVERY 30 MIN PRN
Status: DISCONTINUED | OUTPATIENT
Start: 2017-05-08 | End: 2017-05-08 | Stop reason: HOSPADM

## 2017-05-08 RX ORDER — ONDANSETRON 2 MG/ML
4 INJECTION INTRAMUSCULAR; INTRAVENOUS EVERY 30 MIN PRN
Status: DISCONTINUED | OUTPATIENT
Start: 2017-05-08 | End: 2017-05-08 | Stop reason: HOSPADM

## 2017-05-08 RX ORDER — BUMETANIDE 0.25 MG/ML
2 INJECTION INTRAMUSCULAR; INTRAVENOUS 3 TIMES DAILY
Status: DISCONTINUED | OUTPATIENT
Start: 2017-05-08 | End: 2017-05-08

## 2017-05-08 RX ORDER — BUMETANIDE 2 MG/1
2 TABLET ORAL 3 TIMES DAILY
Status: DISCONTINUED | OUTPATIENT
Start: 2017-05-08 | End: 2017-05-10

## 2017-05-08 RX ORDER — METOPROLOL TARTRATE 1 MG/ML
1-2 INJECTION, SOLUTION INTRAVENOUS EVERY 5 MIN PRN
Status: DISCONTINUED | OUTPATIENT
Start: 2017-05-08 | End: 2017-05-08 | Stop reason: HOSPADM

## 2017-05-08 RX ORDER — FENTANYL CITRATE 50 UG/ML
25-50 INJECTION, SOLUTION INTRAMUSCULAR; INTRAVENOUS
Status: DISCONTINUED | OUTPATIENT
Start: 2017-05-08 | End: 2017-05-08 | Stop reason: HOSPADM

## 2017-05-08 RX ORDER — PROPOFOL 10 MG/ML
INJECTION, EMULSION INTRAVENOUS PRN
Status: DISCONTINUED | OUTPATIENT
Start: 2017-05-08 | End: 2017-05-08

## 2017-05-08 RX ORDER — HYDROMORPHONE HYDROCHLORIDE 1 MG/ML
.3-.5 INJECTION, SOLUTION INTRAMUSCULAR; INTRAVENOUS; SUBCUTANEOUS EVERY 5 MIN PRN
Status: DISCONTINUED | OUTPATIENT
Start: 2017-05-08 | End: 2017-05-08 | Stop reason: HOSPADM

## 2017-05-08 RX ORDER — CEFAZOLIN SODIUM 1 G/3ML
1 INJECTION, POWDER, FOR SOLUTION INTRAMUSCULAR; INTRAVENOUS EVERY 8 HOURS
Status: COMPLETED | OUTPATIENT
Start: 2017-05-09 | End: 2017-05-09

## 2017-05-08 RX ORDER — OXYCODONE HYDROCHLORIDE 5 MG/1
5-10 TABLET ORAL EVERY 4 HOURS PRN
Status: DISCONTINUED | OUTPATIENT
Start: 2017-05-08 | End: 2017-05-08

## 2017-05-08 RX ORDER — VANCOMYCIN HYDROCHLORIDE 1 G/200ML
1000 INJECTION, SOLUTION INTRAVENOUS
Status: COMPLETED | OUTPATIENT
Start: 2017-05-08 | End: 2017-05-08

## 2017-05-08 RX ORDER — SODIUM CHLORIDE, SODIUM LACTATE, POTASSIUM CHLORIDE, CALCIUM CHLORIDE 600; 310; 30; 20 MG/100ML; MG/100ML; MG/100ML; MG/100ML
INJECTION, SOLUTION INTRAVENOUS CONTINUOUS
Status: DISCONTINUED | OUTPATIENT
Start: 2017-05-08 | End: 2017-05-08 | Stop reason: HOSPADM

## 2017-05-08 RX ORDER — FENTANYL CITRATE 50 UG/ML
INJECTION, SOLUTION INTRAMUSCULAR; INTRAVENOUS PRN
Status: DISCONTINUED | OUTPATIENT
Start: 2017-05-08 | End: 2017-05-08

## 2017-05-08 RX ORDER — LIDOCAINE HYDROCHLORIDE 10 MG/ML
INJECTION, SOLUTION INFILTRATION; PERINEURAL PRN
Status: DISCONTINUED | OUTPATIENT
Start: 2017-05-08 | End: 2017-05-08 | Stop reason: HOSPADM

## 2017-05-08 RX ORDER — BUMETANIDE 0.25 MG/ML
2 INJECTION INTRAMUSCULAR; INTRAVENOUS ONCE
Status: DISCONTINUED | OUTPATIENT
Start: 2017-05-08 | End: 2017-05-08

## 2017-05-08 RX ORDER — SODIUM CHLORIDE, SODIUM LACTATE, POTASSIUM CHLORIDE, CALCIUM CHLORIDE 600; 310; 30; 20 MG/100ML; MG/100ML; MG/100ML; MG/100ML
INJECTION, SOLUTION INTRAVENOUS CONTINUOUS PRN
Status: DISCONTINUED | OUTPATIENT
Start: 2017-05-08 | End: 2017-05-08

## 2017-05-08 RX ORDER — HYDROMORPHONE HYDROCHLORIDE 1 MG/ML
.3-.5 INJECTION, SOLUTION INTRAMUSCULAR; INTRAVENOUS; SUBCUTANEOUS
Status: DISCONTINUED | OUTPATIENT
Start: 2017-05-08 | End: 2017-05-08

## 2017-05-08 RX ADMIN — BUMETANIDE 2 MG: 2 TABLET ORAL at 21:47

## 2017-05-08 RX ADMIN — POTASSIUM CHLORIDE 20 MEQ: 29.8 INJECTION, SOLUTION INTRAVENOUS at 05:50

## 2017-05-08 RX ADMIN — NOREPINEPHRINE BITARTRATE 6.4 MCG: 1 INJECTION INTRAVENOUS at 17:03

## 2017-05-08 RX ADMIN — VANCOMYCIN HYDROCHLORIDE 1 G: 1 INJECTION, SOLUTION INTRAVENOUS at 16:11

## 2017-05-08 RX ADMIN — PHYTONADIONE 1 MG: 2 INJECTION, EMULSION INTRAMUSCULAR; INTRAVENOUS; SUBCUTANEOUS at 15:19

## 2017-05-08 RX ADMIN — NOREPINEPHRINE BITARTRATE 6.4 MCG: 1 INJECTION INTRAVENOUS at 16:41

## 2017-05-08 RX ADMIN — PANTOPRAZOLE SODIUM 40 MG: 40 TABLET, DELAYED RELEASE ORAL at 21:49

## 2017-05-08 RX ADMIN — NOREPINEPHRINE BITARTRATE 6.4 MCG: 1 INJECTION INTRAVENOUS at 17:19

## 2017-05-08 RX ADMIN — NOREPINEPHRINE BITARTRATE 6.4 MCG: 1 INJECTION INTRAVENOUS at 16:50

## 2017-05-08 RX ADMIN — NOREPINEPHRINE BITARTRATE 6.4 MCG: 1 INJECTION INTRAVENOUS at 16:43

## 2017-05-08 RX ADMIN — NOREPINEPHRINE BITARTRATE 6.4 MCG: 1 INJECTION INTRAVENOUS at 16:45

## 2017-05-08 RX ADMIN — FENTANYL CITRATE 150 MCG: 50 INJECTION, SOLUTION INTRAMUSCULAR; INTRAVENOUS at 16:40

## 2017-05-08 RX ADMIN — NOREPINEPHRINE BITARTRATE 6.4 MCG: 1 INJECTION INTRAVENOUS at 16:56

## 2017-05-08 RX ADMIN — FENTANYL CITRATE 50 MCG: 50 INJECTION, SOLUTION INTRAMUSCULAR; INTRAVENOUS at 18:50

## 2017-05-08 RX ADMIN — Medication 12.5 MG: at 21:48

## 2017-05-08 RX ADMIN — DEXTROSE MONOHYDRATE: 100 INJECTION, SOLUTION INTRAVENOUS at 00:30

## 2017-05-08 RX ADMIN — SUGAMMADEX 200 MG: 100 INJECTION, SOLUTION INTRAVENOUS at 17:36

## 2017-05-08 RX ADMIN — ATORVASTATIN CALCIUM 40 MG: 40 TABLET, FILM COATED ORAL at 21:46

## 2017-05-08 RX ADMIN — NOREPINEPHRINE BITARTRATE 6.4 MCG: 1 INJECTION INTRAVENOUS at 17:11

## 2017-05-08 RX ADMIN — INSULIN GLARGINE 25 UNITS: 100 INJECTION, SOLUTION SUBCUTANEOUS at 07:49

## 2017-05-08 RX ADMIN — PROPOFOL 50 MG: 10 INJECTION, EMULSION INTRAVENOUS at 16:40

## 2017-05-08 RX ADMIN — NOREPINEPHRINE BITARTRATE 0.03 MCG/KG/MIN: 1 INJECTION INTRAVENOUS at 16:45

## 2017-05-08 RX ADMIN — SODIUM CHLORIDE, POTASSIUM CHLORIDE, SODIUM LACTATE AND CALCIUM CHLORIDE: 600; 310; 30; 20 INJECTION, SOLUTION INTRAVENOUS at 16:11

## 2017-05-08 RX ADMIN — NOREPINEPHRINE BITARTRATE 6.4 MCG: 1 INJECTION INTRAVENOUS at 17:24

## 2017-05-08 RX ADMIN — NOREPINEPHRINE BITARTRATE 6.4 MCG: 1 INJECTION INTRAVENOUS at 16:59

## 2017-05-08 RX ADMIN — NOREPINEPHRINE BITARTRATE 6.4 MCG: 1 INJECTION INTRAVENOUS at 17:22

## 2017-05-08 RX ADMIN — CEFAZOLIN SODIUM 2 G: 2 INJECTION, SOLUTION INTRAVENOUS at 16:40

## 2017-05-08 RX ADMIN — BUMETANIDE 2 MG: 2 TABLET ORAL at 12:34

## 2017-05-08 RX ADMIN — NOREPINEPHRINE BITARTRATE 6.4 MCG: 1 INJECTION INTRAVENOUS at 17:14

## 2017-05-08 RX ADMIN — FENTANYL CITRATE 50 MCG: 50 INJECTION, SOLUTION INTRAMUSCULAR; INTRAVENOUS at 16:19

## 2017-05-08 RX ADMIN — MIDAZOLAM HYDROCHLORIDE 1 MG: 1 INJECTION, SOLUTION INTRAMUSCULAR; INTRAVENOUS at 16:27

## 2017-05-08 RX ADMIN — MIDAZOLAM HYDROCHLORIDE 1 MG: 1 INJECTION, SOLUTION INTRAMUSCULAR; INTRAVENOUS at 16:20

## 2017-05-08 RX ADMIN — NOREPINEPHRINE BITARTRATE 6.4 MCG: 1 INJECTION INTRAVENOUS at 17:08

## 2017-05-08 RX ADMIN — MUPIROCIN 1 G: 20 OINTMENT TOPICAL at 07:49

## 2017-05-08 RX ADMIN — LISINOPRIL 10 MG: 10 TABLET ORAL at 21:48

## 2017-05-08 ASSESSMENT — LIFESTYLE VARIABLES: TOBACCO_USE: 1

## 2017-05-08 NOTE — PLAN OF CARE
Problem: Goal Outcome Summary  Goal: Goal Outcome Summary  1) pt will be hemodynamically stable  2) pt will tolerate weaning of IABP  3) pain will be adequately controlled     Outcome: No Change  No acute events overnight.      VSS, see flowsheet for hemodynamic values.      Pt unable to void spontaneously after dawkins taken out yesterday afternoon, bladder scanned/straight cathed x2 overnight per orders.       Plan for OR this AM at 0830. Heparin continues per straight rate 400 units/hr per orders, to be shut off when OR calls for pt to go down.  NPO after MN, TF off since midnight per instructions from CVTS [facility] [place]. Preop scrub x2 completed.

## 2017-05-08 NOTE — PROGRESS NOTES
Calorie Counts  Intake recorded for: 5/7 Kcals: 0  Protein: 0g  # Meals Recorded: no meals ordered from kitchen, no intake recorded.   # Supplements Recorded: no intake recorded.

## 2017-05-08 NOTE — ANESTHESIA CARE TRANSFER NOTE
Patient: Luke Henao    Procedure(s):  Right Subclavian Graft Removal  - Wound Class: I-Clean    Diagnosis: Heart Failure  Diagnosis Additional Information: No value filed.    Anesthesia Type:   No value filed.     Note:  Airway :Face Mask  Patient transferred to:PACU  Comments: To pACU, VSS, airway patent, RN at bedside, patient awake.      Vitals: (Last set prior to Anesthesia Care Transfer)    CRNA VITALS  5/8/2017 1720 - 5/8/2017 1800      5/8/2017             Pulse: 98    ART BP: 135/71    ART Mean: 46    SpO2: 100 %    Resp Rate (set): 10                Electronically Signed By: RUBI Veloz CRNA  May 8, 2017  6:00 PM

## 2017-05-08 NOTE — PLAN OF CARE
Problem: Goal Outcome Summary  Goal: Goal Outcome Summary  1) pt will be hemodynamically stable  2) pt will tolerate weaning of IABP  3) pain will be adequately controlled     OT 4E: Pt declining therapies due to lack of sleep and dizziness requiring much encouragement and education to participate. Pt's BP within parameters despite reports of dizziness. Pt engaged in BUE AROM x4-5 reps however limited tolerance. Pt's VSS throughout. Pt continues to present with cognitive deficits, decreased strength, and balance deficits - pt to benefit from skilled OT intervention.      Rec TCU when medically stable to maximize independence with ADLs.

## 2017-05-08 NOTE — ANESTHESIA PREPROCEDURE EVALUATION
Anesthesia Evaluation     . Pt has had prior anesthetic.     No history of anesthetic complications          ROS/MED HX    ENT/Pulmonary:     (+)tobacco use, Past use , . .    Neurologic: Comment: Concern for recent CVA      Cardiovascular: Comment: Cardiogenic shock s/p STEMI with recent PCI (LAD, Cx, RCA). 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 : . . . :. . Previous cardiac testing Echodate:4/25/17results:EF 30-35%, severe MR, normal RV, RSVP 32date: results: date: results: date: results:          METS/Exercise Tolerance:     Hematologic:         Musculoskeletal:         GI/Hepatic: Comment: GIB- cristina raman tear        Renal/Genitourinary:         Endo:         Psychiatric:         Infectious Disease:         Malignancy:         Other:                    No Known Allergies   Prescription Medications as of 5/8/2017             ELOCON 0.1 % EX CREA apply to bug bites qd-bid prn    OCUFLOX 0.3 % OP SOLN 1-2 gtts in eye q 4-6 hrs       Facility Administered Medications as of 5/8/2017             warfarin-No DOSE today (Auto Hold) ((Auto Hold) since 5/8/2017  4:23 PM) 1 each no dose today (warfarin)    bumetanide (BUMEX) tablet 2 mg (Auto Hold) ((Auto Hold) since 5/8/2017  4:23 PM) Take 1 tablet (2 mg) by mouth 3 times daily    QUEtiapine (SEROquel) half-tab 12.5 mg (Auto Hold) ((Auto Hold) since 5/8/2017  4:23 PM) 1 half-tab (12.5 mg) by Oral or Feeding Tube route At Bedtime    phytonadione (AQUA-MEPHYTON) 1 mg in NaCl 0.9 % 50 mL intermittent infusion Inject 1 mg into the vein once    insulin 1 units/1 mL saline (NovoLIN, HumuLIN Regular) infusion ADULT/PEDS Inject into the vein continuous    norepinephrine (LEVOPHED) 16 mg in D5W 250 mL infusion Inject 1.86-24.8 mcg/min into the vein continuous    EPINEPHrine (ADRENALIN) 5 mg in NaCl 0.9 % 250 mL infusion Inject 1.86-18.6 mcg/min into the vein continuous    vancomycin (VANCOCIN) 1000 mg in dextrose 5% 200 mL PREMIX Inject 200 mLs (1,000  mg) into the vein Pre-Op/Pre-procedure x 1 dose    lactated ringers infusion Inject into the vein continuous prn    midazolam (VERSED) injection Inject into the vein as needed for anxiety    fentaNYL Citrate (PF) (SUBLIMAZE) injection Inject into the vein as needed for moderate to severe pain    propofol (DIPRIVAN) injection 10 mg/mL vial as needed    norepinephrine (LEVOPHED) drip 16 mg 16 mg continuous prn    ceFAZolin 1000 mg in NaCl 1000 mL 1 g as needed    lidocaine 1 % injection as needed    bumetanide (BUMEX) injection 2 mg (Discontinued) Inject 8 mLs (2 mg) into the vein once    bumetanide (BUMEX) injection 2 mg (Discontinued) Inject 8 mLs (2 mg) into the vein 3 times daily    warfarin (COUMADIN) tablet 5 mg Take 1 tablet (5 mg) by mouth Once at 6pm    HOLD:  All AM Medications except BETA Blockers day of surgery HOLD    mupirocin (BACTROBAN) 2 % ointment 1 g Spray 1 g into both nostrils 2 times daily    ceFAZolin sodium-dextrose (ANCEF) infusion 2 g Inject 100 mLs (2 g) into the vein Pre-Op/Pre-procedure x 1 dose    ceFAZolin (ANCEF) 1 g vial to attach to  ml bag for ADULT or 50 ml bag for PEDS Inject 1 g into the vein See Admin Instructions    acetaminophen (TYLENOL) tablet 975 mg Take 3 tablets (975 mg) by mouth once    lisinopril (PRINIVIL/ZESTRIL) tablet 10 mg (Auto Hold) ((Auto Hold) since 5/8/2017  4:23 PM) Take 1 tablet (10 mg) by mouth 2 times daily    heparin drip 25,000 units in 0.45% NaCl 250 mL (Discontinued) Inject 400 Units/hr into the vein continuous    hydrALAZINE (APRESOLINE) tablet 25 mg (Discontinued) Take 1 tablet (25 mg) by mouth 4 times daily    Warfarin Therapy Reminder (Check START DATE - warfarin may be starting in the FUTURE) 1 each continuous prn    hydrALAZINE (APRESOLINE) tablet 50 mg (Discontinued) Take 1 tablet (50 mg) by mouth 4 times daily    bumetanide (BUMEX) tablet 2 mg (Discontinued) Take 1 tablet (2 mg) by mouth 2 times daily    melatonin tablet 3 mg (Auto Hold)  "((Auto Hold) since 5/8/2017  4:23 PM) Take 1 tablet (3 mg) by mouth nightly as needed for sleep    sodium chloride (OCEAN) 0.65 % nasal spray 1 spray (Auto Hold) ((Auto Hold) since 5/8/2017  4:23 PM) Spray 1 spray into both nostrils every hour as needed for congestion    lisinopril (PRINIVIL/ZESTRIL) tablet 5 mg (Discontinued) Take 1 tablet (5 mg) by mouth 2 times daily    insulin glargine (LANTUS) injection 25 Units (Auto Hold) ((Auto Hold) since 5/8/2017  4:23 PM) Inject 25 Units Subcutaneous every morning (before breakfast)    glucose 40 % gel 15-30 g (Auto Hold) ((Auto Hold) since 5/8/2017  4:23 PM) Take 15-30 g by mouth every 15 minutes as needed for low blood sugar    Linked Group 1:  \"Or\" Linked Group Details     dextrose 50 % injection 25-50 mL (Auto Hold) ((Auto Hold) since 5/8/2017  4:23 PM) Inject 25-50 mLs into the vein every 15 minutes as needed for low blood sugar    Linked Group 1:  \"Or\" Linked Group Details     glucagon injection 1 mg (Auto Hold) ((Auto Hold) since 5/8/2017  4:23 PM) Inject 1 mg Subcutaneous every 15 minutes as needed for low blood sugar (May repeat x 1 only)    Linked Group 1:  \"Or\" Linked Group Details     insulin aspart (NovoLOG) inj (RAPID ACTING) (Auto Hold) ((Auto Hold) since 5/8/2017  4:23 PM) Inject 1-10 Units Subcutaneous 3 times daily (before meals)    insulin aspart (NovoLOG) inj (RAPID ACTING) (Auto Hold) ((Auto Hold) since 5/8/2017  4:23 PM) Inject 1-7 Units Subcutaneous At Bedtime    digoxin (LANOXIN) tablet 125 mcg (Auto Hold) ((Auto Hold) since 5/8/2017  4:23 PM) Take 1 tablet (125 mcg) by mouth daily    lidocaine 1 % 1 mL 1 mL by Other route every hour as needed (mild pain with VAD insertion or accessing implanted port)    lidocaine (LMX4) kit Apply topically every hour as needed for pain (with VAD insertion or accessing implanted port.)    sodium chloride (PF) 0.9% PF flush 3 mL 3 mLs by Intracatheter route every hour as needed for line flush    sodium chloride (PF) " 0.9% PF flush 3 mL 3 mLs by Intracatheter route every 8 hours    HOLD:  Metformin and metformin containing medications if patient received IV contrast HOLD    naloxone (NARCAN) injection 0.1-0.4 mg Inject 0.25-1 mLs (0.1-0.4 mg) into the vein every 2 minutes as needed for opioid reversal    sodium chloride (PF) 0.9% PF flush 3 mL 3 mLs by Intracatheter route every hour as needed for line flush    sodium chloride (PF) 0.9% PF flush 3 mL 3 mLs by Intracatheter route every 8 hours    nitroglycerin (NITROSTAT) sublingual tablet 0.4 mg Place 1 tablet (0.4 mg) under the tongue every 5 minutes as needed for chest pain    aspirin chewable tablet 81 mg (Discontinued) Take 1 tablet (81 mg) by mouth daily    clopidogrel (PLAVIX) tablet 75 mg (Auto Hold) ((Auto Hold) since 5/8/2017  4:23 PM) Take 1 tablet (75 mg) by mouth daily    dextrose 10 % 1,000 mL infusion Inject into the vein continuous prn (Give if on IV Insulin Infusion, and Parenteral or Enteral nutrition held or cycled off. )    morphine 0.1% in solosite topical gel (Auto Hold) ((Auto Hold) since 5/8/2017  4:23 PM) Place onto the skin every 4 hours as needed for pain    lidocaine (LMX4) kit (Auto Hold) ((Auto Hold) since 5/8/2017  4:23 PM) Apply topically once as needed for moderate pain (for local anesthetic during PICC insertion)    heparin lock flush 10 UNIT/ML injection 2-5 mL (Auto Hold) ((Auto Hold) since 5/8/2017  4:23 PM) 2-5 mLs by Intracatheter route once as needed for line flush (for locking each dormant lumen with line placement)    sodium chloride (PF) 0.9% PF flush 10-20 mL (Auto Hold) ((Auto Hold) since 5/8/2017  4:23 PM) 10-20 mLs by Intracatheter route every hour as needed for line flush or post meds or blood draw    heparin lock flush 10 UNIT/ML injection 5-10 mL (Auto Hold) ((Auto Hold) since 5/8/2017  4:23 PM) 5-10 mLs by Intracatheter route every 24 hours    heparin lock flush 10 UNIT/ML injection 5-10 mL (Auto Hold) ((Auto Hold) since 5/8/2017   4:23 PM) 5-10 mLs by Intracatheter route every hour as needed for other (to lock each CVC - Open Ended (Tunneled and Non-Tunneled) dormant lumen.)    acetaminophen (TYLENOL) tablet 650 mg (Auto Hold) ((Auto Hold) since 5/8/2017  4:23 PM) Take 2 tablets (650 mg) by mouth every 4 hours as needed for mild pain or fever    pantoprazole (PROTONIX) suspension 40 mg (Auto Hold) ((Auto Hold) since 5/8/2017  4:23 PM) 20 mLs (40 mg) by Per Feeding Tube route 2 times daily    atorvastatin (LIPITOR) tablet 40 mg (Auto Hold) ((Auto Hold) since 5/8/2017  4:23 PM) 1 tablet (40 mg) by Oral or Feeding Tube route Daily at 8pm    alteplase (CATHFLO ACTIVASE) injection 2 mg (Auto Hold) ((Auto Hold) since 5/8/2017  4:23 PM) Inject 2 mg into the vein daily as needed (clogged lines)    polyethylene glycol (MIRALAX/GLYCOLAX) Packet 17 g (Auto Hold) ((Auto Hold) since 5/8/2017  4:23 PM) Take 17 g by mouth daily as needed for constipation    senna-docusate (SENOKOT-S;PERICOLACE) 8.6-50 MG per tablet 1-2 tablet (Auto Hold) ((Auto Hold) since 5/8/2017  4:23 PM) 1-2 tablets by Oral or NG Tube route 2 times daily as needed for constipation    potassium chloride SA (K-DUR/KLOR-CON M) CR tablet 20-40 mEq (Auto Hold) ((Auto Hold) since 5/8/2017  4:23 PM) Take 2-4 tablets (20-40 mEq) by mouth every 2 hours as needed for potassium supplementation    potassium chloride (KLOR-CON) Packet 20-40 mEq (Auto Hold) ((Auto Hold) since 5/8/2017  4:23 PM) 20-40 mEq by Oral or Feeding Tube route every 2 hours as needed for potassium supplementation    potassium chloride 10 mEq in 100 mL intermittent infusion (Auto Hold) ((Auto Hold) since 5/8/2017  4:23 PM) Inject 100 mLs (10 mEq) into the vein every hour as needed for potassium supplementation    potassium chloride 10 mEq in 100 mL intermittent infusion with 10 mg lidocaine (Auto Hold) ((Auto Hold) since 5/8/2017  4:23 PM) Inject 100 mLs (10 mEq) into the vein every hour as needed for potassium supplementation     potassium chloride 20 mEq in 50 mL intermittent infusion (Auto Hold) ((Auto Hold) since 5/8/2017  4:23 PM) Inject 50 mLs (20 mEq) into the vein every hour as needed for potassium supplementation    pneumococcal vaccine (PNEUMOVAX 23-irma) injection 0.5 mL Inject 0.5 mLs into the muscle once    HYDROmorphone (PF) (DILAUDID) injection 0.3-0.5 mg (Auto Hold) ((Auto Hold) since 5/8/2017  4:23 PM) Inject 0.3-0.5 mg into the vein every 3 hours as needed for moderate to severe pain    oxyCODONE (ROXICODONE) IR tablet 5-10 mg (Auto Hold) ((Auto Hold) since 5/8/2017  4:23 PM) 1-2 tablets (5-10 mg) by Per NG tube route every 4 hours as needed for moderate to severe pain    bisacodyl (DULCOLAX) Suppository 10 mg (Auto Hold) ((Auto Hold) since 5/8/2017  4:23 PM) Place 1 suppository (10 mg) rectally daily as needed for constipation    ondansetron (ZOFRAN) injection 4 mg (Auto Hold) ((Auto Hold) since 5/8/2017  4:23 PM) Inject 2 mLs (4 mg) into the vein every 6 hours as needed for nausea or vomiting    lidocaine 1 % 1 mL 1 mL by Other route every hour as needed (mild pain with VAD insertion or accessing implanted port)    sodium chloride (PF) 0.9% PF flush 3 mL 3 mLs by Intracatheter route every hour as needed for line flush    sodium chloride (PF) 0.9% PF flush 3 mL 3 mLs by Intracatheter route every 8 hours    lidocaine 1 % 0.5-5 mL (Auto Hold) ((Auto Hold) since 5/8/2017  4:23 PM) 0.5-5 mLs by Other route once as needed (mild pain For local anesthetic during PICC insertion.)    sodium chloride (PF) 0.9% PF flush 5-50 mL (Auto Hold) ((Auto Hold) since 5/8/2017  4:23 PM) 5-50 mLs by Intracatheter route once as needed for line flush (to flush each lumen with line placement)    heparin lock flush 10 UNIT/ML injection 2-5 mL (Auto Hold) ((Auto Hold) since 5/8/2017  4:23 PM) 2-5 mLs by Intracatheter route once as needed for line flush (for locking each dormant lumen with line placement)    dextrose 10 % 1,000 mL infusion Inject  into the vein continuous prn (Hypoglycemia prevention)    multivitamins with minerals (CERTAVITE/CEROVITE) liquid 15 mL (Auto Hold) ((Auto Hold) since 5/8/2017  4:23 PM) 15 mLs by Per Feeding Tube route daily    magnesium sulfate 4 g in 100 mL sterile water (premade) (Auto Hold) ((Auto Hold) since 5/8/2017  4:23 PM) Inject 100 mLs (4 g) into the vein every 4 hours as needed for magnesium supplementation    magnesium sulfate 2 g in NS intermittent infusion (PharMEDium or FV Cmpd) (Auto Hold) ((Auto Hold) since 5/8/2017  4:23 PM) Inject 50 mLs (2 g) into the vein daily as needed for magnesium supplementation    potassium phosphate 10 mmol in D5W 250 mL intermittent infusion (Auto Hold) ((Auto Hold) since 5/8/2017  4:23 PM) Inject 10 mmol into the vein daily as needed for phosphorous supplementation    potassium phosphate 15 mmol in D5W 250 mL intermittent infusion (Auto Hold) ((Auto Hold) since 5/8/2017  4:23 PM) Inject 15 mmol into the vein daily as needed for phosphorous supplementation    potassium phosphate 20 mmol in D5W 500 mL intermittent infusion (Auto Hold) ((Auto Hold) since 5/8/2017  4:23 PM) Inject 20 mmol into the vein every 6 hours as needed for phosphorous supplementation    potassium phosphate 20 mmol in D5W 250 mL intermittent infusion (Auto Hold) ((Auto Hold) since 5/8/2017  4:23 PM) Inject 20 mmol into the vein every 6 hours as needed for phosphorous supplementation    potassium phosphate 25 mmol in D5W 500 mL intermittent infusion (Auto Hold) ((Auto Hold) since 5/8/2017  4:23 PM) Inject 25 mmol into the vein every 8 hours as needed for phosphorous supplementation    Patient is already receiving anticoagulation with heparin, enoxaparin (LOVENOX), warfarin (COUMADIN)  or other anticoagulant medication continuous prn    dextrose 10 % 1,000 mL infusion Inject into the vein continuous prn (Give if on IV Insulin Infusion, and Parenteral or Enteral nutrition held or cycled off. )        Recent Results  (from the past 4320 hour(s))   Echocardiogram    Narrative    [id number]  [id number]  [id number]  [id number]^AQUILES^DESMOND^           [facility],[place]  Echocardiography Laboratory  [address] [place] [id number]     Name: SYDNIE SIERRA  MRN: [id number]  : 1965  Study Date: 2017 12:03 PM  Age: 51 yrs  Gender: Male  Patient Location: [facility]  Reason For Study: Mitral Valve Repair  Ordering Physician: DESMOND KWAN  Referring Physician: CHELE ASIF  Performed By: Bryant Reed RDCS     BSA: 1.6 m2  Height: 62 in  Weight: 130 lb  BP: 111/65 mmHg  _____________________________________________________________________________  __        Procedure  Complete Portable Echo Adult.  _____________________________________________________________________________  __        Interpretation Summary  S/p trans-catheter eafa-xd-ylms mitral valve repair (TMVR). There is less than  mild residual mitral regurgitation. The mean gradient is 4.7 mmHg at a heart  rate in excess of 115 bpm.  Dilation of the inferior vena cava is present with normal respiratory  variation in diameter. Estimated mean right atrial pressure is 3-8 mmHg.  No pericardial effusion is present.     _____________________________________________________________________________  __        Left Ventricle  Left ventricular size is normal. Left ventricular wall thickness is normal.  The Ejection Fraction is estimated at 30-35%. Diastolic function not assessed  due to tachycardia. Basal inferolateral hypokinesis.     Right Ventricle  The right ventricle is normal size. Global right ventricular function is  normal.     Atria  The right atria appears normal. Mild left atrial enlargement is present.     Mitral Valve  S/p trans-catheter mext-at-hbdo mitral valve repair (TMVR). There is less than  mild residual mitral regurgitation. The mean gradient is 4.7 mmHg at a heart  rate in excess of 115 bpm.        Aortic Valve  Trace  aortic insufficiency is present.     Tricuspid Valve  Mild tricuspid insufficiency is present. Mild pulmonary hypertension is  present. Right ventricular systolic pressure is 32mmHg above the right atrial  pressure.     Pulmonic Valve  The pulmonic valve cannot be assessed.     Vessels  Dilation of the inferior vena cava is present with normal respiratory  variation in diameter. Estimated mean right atrial pressure is 3-8 mmHg.     Pericardium  No pericardial effusion is present.     _____________________________________________________________________________  __  MMode/2D Measurements & Calculations  LA Volume (BP): 53.6 ml           Doppler Measurements & Calculations  MV max PG: 10.1 mmHg  MV mean P.7 mmHg  MV V2 VTI: 22.3 cm  TV max P.9 mmHg  TR max isabel: 282.3 cm/sec  TR max P.9 mmHg           _____________________________________________________________________________  __           Report approved by: Isabel Daniel 2017 01:21 PM      ECHO LIMITED WITH OPTISON    Narrative    [id number]  [id number]  [id number]  [id number]^ESTELA^CORTES^MCKENZIE           [facility],[place]  Echocardiography Laboratory  [address] [place] [id number]     Name: SYDNIE SIERRA  MRN: [id number]  : 1965  Study Date: 2017 04:42 PM  Age: 51 yrs  Gender: Male  Patient Location: [facility]  Reason For Study: Mitral Regurgitation  Ordering Physician: CORTES PALMER  Referring Physician: CHELE ASIF  Performed By: Emily Gutierrez RDCS     BSA: 1.6 m2  Height: 62 in  Weight: 134 lb  BP: 137/74 mmHg  _____________________________________________________________________________  __        Procedure  Limited Portable Echo Adult. Contrast Optison. Optison (NDC #1875-3375-94)  given intravenously. Patient was given 5 ml mixture of 3 ml Optison and 6 ml  saline. 4 ml wasted.  _____________________________________________________________________________  __        Interpretation  Summary  The Ejection Fraction is estimated at 30-35% (calculated, 36%) at a heart rate  in excess of 130 bpm.  Severe mitral insufficiency is present.  _____________________________________________________________________________  __        Left Ventricle  Left ventricular size is normal. Left ventricular wall thickness is normal.  The Ejection Fraction is estimated at 30-35%. Basal lateral, inferior  akinesis.     Right Ventricle  The right ventricle is normal size. Global right ventricular function is  normal.     Mitral Valve  Severe mitral insufficiency is present.     Tricuspid Valve  Mild tricuspid insufficiency is present. Right ventricular systolic pressure  is 32mmHg above the right atrial pressure.        Pericardium  No pericardial effusion is present.  _____________________________________________________________________________  __     MMode/2D Measurements & Calculations  IVSd: 0.73 cm  LVIDd: 4.9 cm  LVIDs: 4.1 cm  LVPWd: 0.88 cm  FS: 17.8 %  EDV(Teich): 115.0 ml  ESV(Teich): 72.5 ml  LV mass(C)d: 134.7 grams        Doppler Measurements & Calculations  MR ERO: 0.26 cm2  MR volume: 20.4 ml  TV max P.7 mmHg  TR max isabel: 281.6 cm/sec  TR max P.7 mmHg              _____________________________________________________________________________  __        Report approved by: Isabel Daniel 2017 06:42 PM      Echo Complete Bubble    Narrative    [id number]  [facility]  [id number]  [id number]^SUZANNE^CHRISTIANO^J           [facility],[place]  Echocardiography Laboratory  [address] [place] [id number]     Name: SYDNIE SIERRA  MRN: [id number]  : 1965  Study Date: 2017 11:53 AM  Age: 51 yrs  Gender: Male  Patient Location: [facility]  Reason For Study: Pre LVAD  Ordering Physician: CHRISTIANO PALACIOS  Referring Physician: CHELE ASIF  Performed By: Yung Garsia RDCS     BSA: 1.6 m2  Height: 62 in  Weight: 136 lb  HR: 108  BP: 100/66  mmHg  _____________________________________________________________________________  __        Procedure  Bubble Echocardiogram with two-dimensional, color and spectral Doppler  performed.  _____________________________________________________________________________  __        Interpretation Summary  Moderately (EF 38%) reduced left ventricular function is present.  Global right ventricular function is normal.  Mild to moderate tricuspid and mitral insufficiency is present.  The atrial septum is intact as assessed by color Doppler and agitated saline  bubble study .  Estimated pulmonary artery systolic pressure is 38 mmHg plus right atrial  pressure.  The inferior vena cava was dilated at 2.1 cm without respiratory variability,  consistent with increased right atrial pressure.  No pericardial effusion is present.  _____________________________________________________________________________  __        Left Ventricle  Left ventricular size is normal. :LVIDd/s = 4.6/4.0 cm. Moderately (EF 35-40%)  reduced left ventricular function is present.     Right Ventricle  The right ventricle is normal size. Global right ventricular function is  normal. TAPSE 1.4 cm, S' 10 cm/s. A right heart catheter is noted in the right  ventricle.     Atria  Both atria appear normal. The atrial septum is intact as assessed by color  Doppler and agitated saline bubble study .     Mitral Valve  Mild mitral annular calcification is present. Mild to moderate mitral  insufficiency is present.        Aortic Valve  Mild aortic insufficiency is present.     Tricuspid Valve  Moderate tricuspid insufficiency is present. Estimated pulmonary artery  systolic pressure is 38 mmHg plus right atrial pressure.     Pulmonic Valve  The pulmonic valve cannot be assessed.     Vessels  The inferior vena cava was dilated at 2.1 cm without respiratory variability,  consistent with increased right atrial pressure.     Pericardium  No pericardial effusion is  present.        Compared to Previous Study  This study was compared with the study from . . There has been no change.  _____________________________________________________________________________  __  MMode/2D Measurements & Calculations  IVSd: 1.0 cm     LVIDd: 4.6 cm  LVIDs: 4.0 cm  LVPWd: 1.1 cm  FS: 13.1 %  EDV(Teich): 99.3 ml  ESV(Teich): 71.3 ml  LV mass(C)d: 175.6 grams  Ao root diam: 3.0 cm  asc Aorta Diam: 3.0 cm  RVOT diam: 1.8 cm  LA Volume (BP): 40.1 ml  LA Volume Index (BP): 24.8 ml/m2           Doppler Measurements & Calculations  MV E max isabel: 111.0 cm/sec  MV A max isabel: 61.6 cm/sec  MV E/A: 1.8  MV dec time: 0.14 sec  PA acc time: 0.07 sec  TR max isabel: 307.0 cm/sec  TR max P.7 mmHg  Lateral E/e': 13.2  Medial E/e': 18.9           _____________________________________________________________________________  __           Report approved by: Isabel Alex 2017 02:49 PM      Echo limited (Adults Only)    Narrative    [id number]  ECH11  [id number]  [id number]^MAIRA^DIVINE^CHECO           [facility],[place]  Echocardiography Laboratory  [address] [place] [id number]     Name: SYDNIE SIERRA  MRN: [id number]  : 1965  Study Date: 2017 01:48 PM  Age: 51 yrs  Gender: Male  Patient Location: [facility]  Reason For Study: CAD, Eval MR. EF  Ordering Physician: DIVINE RAO  Referring Physician: CHELE ASIF  Performed By: Yung Garsia RDCS     BSA: 1.6 m2  Height: 62 in  Weight: 138 lb  HR: 111  BP: 82/47 mmHg  _____________________________________________________________________________  __        Procedure  Limited Portable Echo Adult.  _____________________________________________________________________________  __        Interpretation Summary  Moderately (EF 35-40%) reduced left ventricular function is present. Traced at  40%.  Moderate mitral insufficiency is present.  The cause of the mitral insufficiency appears to be restricted  posterior  leaflet from posterior MI. No mitral leaflet pathology seen.  Dilation of the inferior vena cava is present with abnormal respiratory  variation in diameter.     No change from last study.  _____________________________________________________________________________  __        Left Ventricle  Left ventricular size is normal. Moderately (EF 35-40%) reduced left  ventricular function is present. Posterior wall akinesis is present. Inferior  wall akinesis is present. Lateral wall akinesis is present.     Right Ventricle  The right ventricle is normal size. Global right ventricular function is  normal. A right heart catheter is noted in the right ventricle.     Mitral Valve  Moderate mitral insufficiency is present. The cause of the mitral  insufficiency appears to be restricted posterior leaflet.     Tricuspid Valve  Trace tricuspid insufficiency is present.        Vessels  Dilation of the inferior vena cava is present with abnormal respiratory  variation in diameter.     Pericardium  No pericardial effusion is present.  _____________________________________________________________________________  __                                Report approved by: Isabel Yepez 2017 03:04 PM                    _____________________________________________________________________________  __      ECHO COMPLETE WITH OPTISON    Narrative    [id number]  [id number]  [id number]  [id number]^MAIRA^DIVNIE^CHECO           [facility],[place]  Echocardiography Laboratory  [address] [place] [id number]     Name: SYDNIE SIERRA  MRN: [id number]  : 1965  Study Date: 2017 07:21 PM  Age: 51 yrs  Gender: Male  Patient Location: [facility]  Reason For Study: Heart Failure. Comments: pls assess LV and RV function and  mitral regurg/papillary muscle rupture?  Ordering Physician: DIVINE RAO  Referring Physician: CHELE ASIF  Performed By: Tony Mallory RDCS     BSA: 1.6  m2  Height: 62 in  Weight: 135 lb  HR: 137  BP: 98/56 mmHg  _____________________________________________________________________________  __        Procedure  Complete Portable Echo Adult. Contrast Optison. Optison (NDC #3252-2088-00)  given intravenously. Patient was given 3.5 ml mixture of 3 ml Optison and 6 ml  saline. 5.5 ml wasted.  _____________________________________________________________________________  __        Interpretation Summary  Left ventricular size is normal.  The Ejection Fraction is estimated at 35-40%.  Inferior wall akinesis is present.  Lateral wall akinesis is present.  Posterior wall akinesis is present.  Right ventricular function, chamber size, wall motion, and thickness are  normal.  Moderate mitral insufficiency is present.  Moderate tricuspid insufficiency is present.  Right ventricular systolic pressure is 47mmHg above the right atrial pressure.  The inferior vena cava is normal.  _____________________________________________________________________________  __        Left Ventricle  Left ventricular size is normal. Left ventricular wall thickness is normal.  The Ejection Fraction is estimated at 35-40%. Grade III or advanced diastolic  dysfunction. Inferior wall akinesis is present. Lateral wall akinesis is  present. Posterior wall akinesis is present.     Right Ventricle  Right ventricular function, chamber size, wall motion, and thickness are  normal.     Atria  Both atria appear normal.     Mitral Valve  Moderate mitral insufficiency is present.        Aortic Valve  Aortic valve is normal in structure and function.     Tricuspid Valve  Moderate tricuspid insufficiency is present. Right ventricular systolic  pressure is 47mmHg above the right atrial pressure.     Pulmonic Valve  The pulmonic valve is normal. Trace pulmonic insufficiency is present.     Vessels  The aorta root is normal. The pulmonary artery is normal. The inferior vena  cava is normal.     Compared to Previous  Study  Previous study not available for comparison.     _____________________________________________________________________________  __  MMode/2D Measurements & Calculations  LA Volume (BP): 49.0 ml     LA Volume Index (BP): 30.2 ml/m2        Doppler Measurements & Calculations  MV E max frank: 119.4 cm/sec  MV A max frank: 56.8 cm/sec  MV E/A: 2.1  MV dec time: 0.08 sec  Ao V2 max: 141.6 cm/sec  Ao max P.0 mmHg  PA V2 max: 104.7 cm/sec  PA max P.4 mmHg  TR max frank: 329.6 cm/sec  TR max P.5 mmHg  Pulm Sys Frank: 30.5 cm/sec  Pulm Armando Frank: 52.8 cm/sec  Pulm S/D: 0.58  Lateral E/e': 17.5  Medial E/e': 15.3              _____________________________________________________________________________  __        Report approved by: Isabel Love 2017 10:11 PM      ECHO LIMITED WITH OPTISON    Narrative    [id number]  [id number]  [id number]  [id number]^LEONARD ECKERT^RODOLFO^EMBER           [facility],[place]  Echocardiography Laboratory  [address] [place] [id number]     Name: SYDNIE SIERRA  MRN: [id number]  : 1965  Study Date: 2017 11:40 AM  Age: 51 yrs  Gender: Male  Patient Location: [facility]  Reason For Study: CAD- Assess for WMA  History: CAD- Assess for WMA  Ordering Physician: RODOLFO BROWNE  Referring Physician: CHELE ASIF  Performed By: Lila Miner RDCS     BSA: 1.7 m2  Height: 62 in  Weight: 144 lb  BP: 115/57 mmHg  _____________________________________________________________________________  __        Procedure  Limited Portable Echo Adult. Contrast Optison. Optison (NDC #4046-1639-35)  given intravenously. Patient was given 6 ml mixture of 3 ml Optison and 6 ml  saline. 3 ml wasted.  _____________________________________________________________________________  __        Interpretation Summary  Inferior,posterior,lateral,basal septal wall akinesis as reported before.  No  change.  _____________________________________________________________________________  __        Left Ventricle  The Ejection Fraction is estimated at 50-55%. Inferior,posterior,lateral,basal  septal wall akinesis as reported before. No change.  _____________________________________________________________________________  __           Doppler Measurements & Calculations  MV E max isabel: 117.7 cm/sec  MV A max isabel: 62.2 cm/sec  MV E/A: 1.9  MV dec time: 0.09 sec  Lateral E/e': 16.1  Medial E/e': 18.0              _____________________________________________________________________________  __        Report approved by: Isabel Love 2017 12:20 PM      ECHO LIMITED WITH OPTISON    Narrative    [id number]  [id number]  [id number]  [id number]^LEONARD ECKERT^RODOLFO^EMBER           [facility],[place]  Echocardiography Laboratory  [address] [place] [id number]     Name: SYDNIE SIERRA  MRN: [id number]  : 1965  Study Date: 2017 08:54 AM  Age: 51 yrs  Gender: Male  Patient Location: [facility]  Reason For Study: CHF  Ordering Physician: RODOLFO BROWNE  Referring Physician: CHELE ASIF  Performed By: Emily Gutierrez RDCS     BSA: 1.6 m2  Height: 62 in  Weight: 139 lb  BP: 124/97 mmHg  _____________________________________________________________________________  __        Procedure  Limited Portable Echo Adult. Contrast Optison. Optison (NDC #8927-3348-63)  given intravenously. Patient was given 5 ml mixture of 3 ml Optison and 6 ml  saline. 4 ml wasted.  _____________________________________________________________________________  __        Interpretation Summary  Limited study. Ischemic CM. Moderately (EF 30-35%) reduced left ventricular  function is present. Traced at 32%.  Posterior wall akinesis is present. Lateral wall akinesis is present. Inferior  wall akinesis is present.  Right ventricular function, chamber size, wall motion, and thickness are  normal.  Dilation of  the inferior vena cava is present with abnormal respiratory  variation in diameter.     Compared to prior study, RV fxn is improved.  _____________________________________________________________________________  __        Left Ventricle  Left ventricular size is normal. Mild concentric wall thickening consistent  with left ventricular hypertrophy is present. Moderately (EF 30-35%) reduced  left ventricular function is present. Posterior wall akinesis is present.  Lateral wall akinesis is present. Inferior wall akinesis is present.     Right Ventricle  Right ventricular function, chamber size, wall motion, and thickness are  normal.     Mitral Valve  Mild to moderate mitral insufficiency is present.     Aortic Valve  The aortic valve is tricuspid. Trace aortic insufficiency is present.        Tricuspid Valve  Trace to mild tricuspid insufficiency is present. The right ventricular  systolic pressure is approximated at 27.8 mmHg plus the right atrial pressure.     Vessels  The aorta root is normal. Dilation of the inferior vena cava is present with  abnormal respiratory variation in diameter.     Pericardium  No pericardial effusion is present.  _____________________________________________________________________________  __     MMode/2D Measurements & Calculations  IVSd: 1.1 cm  LVIDd: 4.4 cm  LVIDs: 3.8 cm  LVPWd: 1.4 cm  FS: 12.6 %  EDV(Teich): 86.7 ml  ESV(Teich): 63.0 ml  LV mass(C)d: 201.6 grams  TAPSE: 1.2 cm        Doppler Measurements & Calculations  MR ERO: 0.15 cm2  MR volume: 13.4 ml  TV max P.8 mmHg     TR max isabel: 263.8 cm/sec  TR max P.8 mmHg           _____________________________________________________________________________  __           Report approved by: Isabel Yepez 2017 10:04 AM      Echocardiogram Limited    Narrative    [id number]  [id number]  [id number]  [id number]^MARCH^SHAMYESENIA^JAZMYNE           [facility]  Echocardiography Laboratory  [address] [place] [address]  [place] [address]        Name: SYDNIE SIERRA  MRN: [id number]  : 1965  Study Date: 2017 12:46 PM  Age: 51 yrs  Gender: Male  Patient Location: [facility]  Reason For Study: Murmur, MI - Acute  Ordering Physician: CHELE ASIF  Referring Physician: PABLO PMD  Performed By: Flo Shoemaker RDCS     BSA: 1.6 m2  Height: 62 in  Weight: 136 lb  HR: 99  BP: 134/62 mmHg  _____________________________________________________________________________  __        Procedure  Limited Portable Echo Adult.  _____________________________________________________________________________  __        Interpretation Summary     The visual ejection fraction is estimated at 30-35%.  There is extensive inferior, inferoseptal, and inferolateral wall akinesis.  Basal/mid lateral wall hypokinesis  There is moderate to severe septal hypokinesis.  Septal wall motion abnormality may reflect pacemaker activation.  There is a catheter/pacemaker lead seen in the right ventricle.  The right ventricle is mildly dilated.  Severely decreased right ventricular systolic function  There is no pericardial effusion.  The rhythm was paced.  Compared to the prior study, the LVEF appears somewhat decreased. Septal wall  motion abnormality larger- may reflect, in part, that patient in sinus tach on  prior study, and ventricular paced now. No hemodynamically significant  valvular abnormalities on 2D or color flow imaging.  _____________________________________________________________________________  __        Left Ventricle  The left ventricle is normal in size. Left ventricular hypertrophy is noted by  two-dimensional echocardiography. The visual ejection fraction is estimated at  30-35%. There is inferior wall akinesis. There is moderate to severe septal  hypokinesis. Septal wall motion abnormality may reflect pacemaker activation.     Right Ventricle  There is a catheter/pacemaker lead seen in the right ventricle. The right  ventricle is mildly  dilated. Severely decreased right ventricular systolic  function.     Atria  Normal left atrial size. Right atrial size is normal.     Mitral Valve  The mitral valve is normal in structure and function. There is trace mitral  regurgitation.        Tricuspid Valve  The tricuspid valve is not well visualized, but is grossly normal. There is  trace tricuspid regurgitation. Right ventricular systolic pressure could not  be approximated due to inadequate tricuspid regurgitation.     Aortic Valve  The aortic valve is trileaflet. There is trace aortic regurgitation. No aortic  stenosis is present.     Pulmonic Valve  The pulmonic valve is not well visualized. There is no pulmonic valvular  regurgitation.     Vessels  The aortic root is normal size. Normal size ascending aorta. Inferior vena  cava not well visualized for estimation of right atrial pressure.     Pericardium  There is no pericardial effusion.        Rhythm  The rhythm was paced.  _____________________________________________________________________________  __                                Report approved by: Isabel Whitney 2017 02:17 PM                    _____________________________________________________________________________  __      Echocardiogram Limited    Narrative    [id number]  [facility]  [id number]  [id number]^LULU^KATHLEEN^EVANGELINA           [facility]  Echocardiography Laboratory  [address] [place] [address]        Name: SYDNIE SIERRA  MRN: [id number]  : 1965  Study Date: 2017 07:22 PM  Age: 51 yrs  Gender: Male  Patient Location: [facility]  Reason For Study: MI - Acute  History: acute MI  Ordering Physician: KATHLEEN LAWSON  Performed By: Jitendra Davis RDCS     BSA: 1.7 m2  Height: 62 in  Weight: 151 lb  _____________________________________________________________________________  __        Procedure  Limited Portable Echo Adult. (Emergent exam, abbreviated study  performed).  _____________________________________________________________________________  __        Interpretation Summary     The visual ejection fraction is estimated at 40%.  There is inferior, ineroseptal & inferolateral wall akinesis, entire walls.  The left ventricular cavity is small.  The right ventricle is mild to moderately dilated.  The right ventricular systolic function is severely reduced.  There is no pericardial effusion.  There is mild to moderate (1-2+) tricuspid regurgitation.  Limited views were obtained.  _____________________________________________________________________________  __        Left Ventricle  The left ventricle is normal in size. The left ventricular cavity is small.  Left ventricular hypertrophy is noted by two-dimensional echocardiography. The  visual ejection fraction is estimated at 40%. There is inferior &  inferolateral wall akinesis.     Right Ventricle  The right ventricle is mild to moderately dilated. The right ventricular  systolic function is severely reduced.     Atria  Normal left atrial size. The right atrium is mildly dilated.        Mitral Valve  The mitral valve is normal in structure and function. There is mild (1+)  mitral regurgitation.     Tricuspid Valve  The tricuspid valve is normal in structure and function. There is mild to  moderate (1-2+) tricuspid regurgitation.     Aortic Valve  The aortic valve is normal in structure and function.     Pulmonic Valve  The pulmonic valve is not well visualized. There is no pulmonic valvular  regurgitation.     Pericardium  There is no pericardial effusion.        Rhythm  Indeterminate rhtym (no EKG trace, no MV continuous wave Doppler).  _____________________________________________________________________________  __  MMode/2D Measurements & Calculations  IVSd: 1.1 cm     LVIDd: 4.2 cm  LVIDs: 3.1 cm  LVPWd: 1.2 cm  FS: 27.3 %  EDV(Teich): 78.5 ml  ESV(Teich): 36.4 ml  LV mass(C)d: 165.8 grams  Ao root diam: 2.9  cm  LA dimension: 3.5 cm  LA/Ao: 1.2                 _____________________________________________________________________________  __           Report approved by: Isabel Whitney 03/27/2017 08:27 AM        PAST MEDICAL HISTORY: No past medical history on file.    PAST SURGICAL HISTORY:   Past Surgical History:   Procedure Laterality Date     COLONOSCOPY N/A 4/17/2017    Procedure: COLONOSCOPY;  Surgeon: Rashaad Bundy MD;  Location:  GI     INSERT INTRAAORTIC BALLOON PUMP Right 4/19/2017    Procedure: INSERT INTRAAORTIC BALLOON PUMP;  Right Subclavian Intra Aortic Balloon Pump Insertion using Maquet 40cc Ballon Catheter, Implentation of 8mm Gelweave Woven Vascular Prosthesis, Removal of Left Femoral Ballon Pump Catheter, Flouroscopy;  Surgeon: Keshav Leung MD;  Location:  OR     PERCUTANEOUS MITRAL VALVE REPAIR N/A 5/1/2017    Procedure: PERCUTANEOUS MITRAL VALVE REPAIR ANESTHESIA;  Mitraclip Procedure Possible Cardiopulmonary Bypass ;  Surgeon: Ron Cortez MD;  Location:  OR       FAMILY HISTORY: No family history on file.    SOCIAL HISTORY:   Social History   Substance Use Topics     Smoking status: Current Every Day Smoker     Packs/day: 0.50     Types: Cigarettes     Smokeless tobacco: Not on file     Alcohol use Not on file     Lab Results   Component Value Date    WBC 6.8 05/08/2017    HGB 9.9 (L) 05/08/2017    HCT 25.3 (L) 05/08/2017     05/08/2017    CHOL 128 03/27/2017    TRIG 76 03/27/2017    HDL 36 (L) 03/27/2017    ALT 30 05/08/2017    AST 25 05/08/2017     05/08/2017    BUN 23 05/08/2017    CO2 30 05/08/2017    TSH 2.54 04/14/2017    PSA 4.50 (H) 04/15/2017    INR 2.22 (H) 05/08/2017     Prescriptions Prior to Admission   Medication Sig Dispense Refill Last Dose     ELOCON 0.1 % EX CREA apply to bug bites qd-bid prn 30g 0      OCUFLOX 0.3 % OP SOLN 1-2 gtts in eye q 4-6 hrs  1 0                     Anesthesia Plan      History & Physical  Review  History and physical reviewed and following examination; no interval change.    ASA Status:  4 .    NPO Status:  > 8 hours    Plan for General and ETT with Intravenous induction. Maintenance will be Balanced.      Additional equipment: CVP and Arterial Line      Postoperative Care      Consents                          .

## 2017-05-08 NOTE — PROGRESS NOTES
"[facility]  Cardiology II Service / Advanced Heart Failure  4/30/2017    Interval History:   No acute events overnight, notes reviewed.  Sleeping better.  CVP up to 17.  Net neutral fluid balance.  Not sleeping well.  No SOB or dyspneic.  Some urinary retention, getting straight cath.  Dizziness with PT.     Examination:  BP (!) 82/64  Temp 98.6  F (37  C) (Oral)  Resp 12  Ht 1.575 m (5' 2.01\")  Wt 60 kg (132 lb 4.4 oz)  SpO2 98%  BMI 24.19 kg/m2     GEN: NAD, lying in bed  NECK: Full ROM, no JVP appreciated  RESP: Chest clear, no wheeze or crackle  CV: RRR, no m/c/r  ABD: Soft, nontender to palpation, no HSM, +BS  EXT: WWP, no edema   SKIN: Normal skin turgor, no rash     Data:  Intake/Output Summary (Last 24 hours) at 05/08/17 1115  Last data filed at 05/08/17 1000   Gross per 24 hour   Intake           1570.5 ml   Output             1465 ml   Net            105.5 ml       Cr. 0.77    CVP 17    Imaging Studies:    CTA heaf and neck  Impression:   1. Head CTA demonstrates at least moderate stenosis of both cavernous  internal carotid arteries. Diffuse mild to moderate narrowing of the  basilar artery. Additional areas of mild bilateral M2 segment  narrowing, presumably also related to atherosclerosis.  2. No aneurysm of the major intracranial arteries.   3. Neck CTA demonstrates no carotid artery stenosis. Short segment  mild narrowing of the proximal right V2 segment.  4. No change in probable bilateral cerebral white matter infarcts. No  acute intracranial hemorrhage    Echo 3/27  The visual ejection fraction is estimated at 30-35%.  There is extensive inferior, inferoseptal, and inferolateral wall akinesis.  Basal/mid lateral wall hypokinesis  There is moderate to severe septal hypokinesis.  Septal wall motion abnormality may reflect pacemaker activation.  There is a catheter/pacemaker lead seen in the right ventricle.  The right ventricle is mildly dilated.  Severely " decreased right ventricular systolic function  There is no pericardial effusion.  The rhythm was paced.  Compared to the prior study, the LVEF appears somewhat decreased. Septal wall  motion abnormality larger- may reflect, in part, that patient in sinus tach on  prior study, and ventricular paced now. No hemodynamically significant  valvular abnormalities on 2D or color flow imaging     CT chest/abdomen 3/27   IMPRESSION:   1. Small mediastinal hemorrhage, small bilateral pleural effusions  which may be hemorrhagic. Small intraperitoneal hemorrhage. Minimal  pericardial hemorrhage.  2. Minimal pneumoperitoneum in the left paracolic gutter, of unknown  significance. No pneumatosis as clinically questioned.  3. IABP slightly low in position.  4. Bilateral pulmonary dependent consolidations represent compression  atelectasis, however differentials include aspiration.     Cath 3/26 [facility]  SUMMARY:   --Inferior STEMI w/ late presentation. Culprit dictated by complete  heart block, and cardiogenic shock. Emergent echo in the Cath Lab also  shows significant RV dysfunction.  --Three vessel coronary artery disease with diffuse coronary disease  out to the distal vessels  --Successful POBA of the prox and mid-RCA. Stent was not placed, in  anticipation he may need to be considered for bypass surgery in the  near future  --Insertion of a temporary pacemaker for bradycardia (AVB) and  hypotension  --Insertion of a IABP  --Upper GI bleed consistent with [facility] 3/27     CT head 3/28: normal      Echo 3/29  Interpretation Summary  Limited study. Ischemic CM. Moderately (EF 30-35%) reduced left ventricular  function is present. Traced at 32%.  Posterior wall akinesis is present. Lateral wall akinesis is present. Inferior  wall akinesis is present.  Right ventricular function, chamber size, wall motion, and thickness are  normal.  Dilation of the inferior vena cava is present with abnormal  respiratory  variation in diameter.     Compared to prior study, RV fxn is improved.     Echo 3/31  Left Ventricle  The Ejection Fraction is estimated at 50-55%. Inferior,posterior,lateral,basal  septal wall akinesis as reported before. No change.     CORONARY ANGIOGRAM 4/1:   1. Both coronary arteries arise from their respective cusps.  2. Right dominant.  3. LM has mild luminal irregularities.   4. LAD supplies the apex along with the RCA (type 2 LAD) and gives rise to septal perforators and a large and branching D1. There are widely patent stents extending from the proximal to mid LAD. The dLAD has mild to moderate disease to 30% stenosis. The D1 is jailed by the LAD stents, but has EBER 3 flow with disease to 30% stenosis.   5. The small ramus is no longer present.  6. LCX is occluded at the ostium.   7. RCA gives rise to PL branches and supplies PDA. There are widely patent stents extending from the proximal to mid RCA. The remainder of the mRCA has disease to 50% stenosis and the dRCA has disease to 10% stenosis. The RPDA has a widely patent stent and the remainder of the artery has mild disease to 10% stenosis. The RPLA has small vessels with diffuse disease including a distal branch occlusion. The RPLA supplies some small collateral branches to the dLCx.      COMPLICATIONS:  1. None      SUMMARY:   1. Cardiogenic shock following an inferior STEMI.  2. The LCx re-occlusion is not surprising as the recently revascularized LCx  had poor outflow and the revascularized portion did not supply a large territory (limited to no flow was present distal to the stented segment immediately following stenting). Repeat revascularization of the LCx would result in the same outcome of repeat closure and also risk embolizing thrombus from the LCx into the LAD so no treatment of the occluded LCx was performed.   3. Three vessel coronary artery disease with patent LAD and RCA stents and an occluded LCx.     Echo 4/3  Left  ventricular size is normal.  The Ejection Fraction is estimated at 35-40%.  Inferior wall akinesis is present.  Lateral wall akinesis is present.  Posterior wall akinesis is present.  Right ventricular function, chamber size, wall motion, and thickness are  normal.  Moderate mitral insufficiency is present.  Moderate tricuspid insufficiency is present.  Right ventricular systolic pressure is 47mmHg above the right atrial pressure.  The inferior vena cava is normal.     Echo 4/5  Moderately (EF 35-40%) reduced left ventricular function is present. Traced at  40%.  Moderate mitral insufficiency is present.  The cause of the mitral insufficiency appears to be restricted posterior  leaflet from posterior MI. No mitral leaflet pathology seen.  Dilation of the inferior vena cava is present with abnormal respiratory  variation in diameter.     CT abd without contrast 4/4  IMPRESSION:   1. No evidence of ischemic bowel, obstruction, or intra-abdominal  infection.  2. Bibasilar patchy pulmonary opacities likely represent combination  of aspiration and atelectasis.  3. Small amount of simple free fluid within the lower abdomen.  4. Small left retroperitoneal hematoma along the iliac vasculature  likely postprocedural.   5. Unchanged size of bilateral pleural effusions, which now consist of  simple fluid.    Assessment:  51 year old man presented with cardiogenic shock from inferior STEMI s/p RCA aspiration thrombectomy and POBA on 3/26 at [facility] s/p IABP and initiation of Dopamine, also during procedure, CHB s/p temp pacer, emesis/hematmesis and altered mental status s/p intubation transferred here for consideration of mechanical support on 3/27. Had PCI to RCA, LAD and LCx (on ASA and plavix) started on epinephrine in addition to dopamine, post procedure developed distributive shock picture from infection (aspiration pneumonia? Sputum cx growing staph aureus, on vanco and zosyn) vs post-MI SIRS response. Developed  cardiogenic shock now s/p IABP, removed 5/6/17.      Plan Today:  - Increase lisinopril 10mg BID  - d/c hydralazine  - Increase bumex 2mg TID  - Start seroquel 12.5mg  For sleep  - d/c ASA  - Continue warfarin/plavix  - d/c heparin    # Cardiogenic shock   # CAD  # Ischemic cardiomyopathy  2/2 underlying 3v CAD-has had high SVR and low CI, complicated by ischemic MR.  ICM to inferior wall STEMI s/p 7 FEMI to LAD, LCx, RCA on 3/27. LCx was  and is re-occluded on angio 4/1.  Cortisol normal.  MitraClip placed on 5/1, IABP weaned post procedure.  Discontinued IABP on 5/5.  d/c Hydralazine 50mg QID (5/8).   - Lisinopril 10 mg BID  - Digoxin 125mcg  - Bumex 2mg TID  - Subclavian line will need to me removed in the OR  - Needs plavix 1 year  - Add back spironolactone and BB as BP allows    # h/o CVA  CVA presenting with left hand weakness and pain.  CTA head/neck showed no hemorrhage but new areas of low attenuation in b/l hemispheres.  - Neuro consulted. Continuing Plavix and statin  - MAP goal > 70    # Mitral regurg s/p MitraClip  - Needs lifelong antiplatelet, continue Plavix for 1 year for FEMI then change to ASA    # Hypoxic respiratory failure  Intubated in the setting of worsening hypoxia on 4/25, extubated 4/28.  Thought to be aspiration PNA vs PE vs volume overload.  Treated with broad spectrum abx for two weeks starting 4/19.    - Continue to monitor volume status  - Diuresing to CVP < 12, currently on 2L NC    # Urinary tract infection  UA 4/15 with cloudy urine, 50 WBCs, many bacteria, no nitrates. UCx grew E coli - treated with CFTX (sensitive).  Fever on 4/20 and single spike 100.2 overnight with blood cultures in process (no growth thus far), PICC and SGC removed, started on Vanc and pip/tazo, treatment completed on 5/1.   - CTM WBC and fever curve    # T2DM:   HA1C 7.5 on admission.  - Insulin gtt per nurising protocol with hypoglycemia precautions.   - SQ Lantus       # Lower GIB  Had several maroon BMs  since 4/11, Hb overall dropped by a 1.5 grams or so, heparin inf was held and GI consulted.  Colonoscopy 4/17 showed a rectal ulcer but no intervenable lesions.   - Trend hgb  - Protonix 40 PO BID    # H/o DVT  Non-occlusive clot in left IJ and left common femoral vein. S/p Heparin gtt low.   intensity  - Warfarin, pharmacy to dose      # Hypernatremia  - Resolved. Stopped his D5W, only getting FWF's.    # Insomnia  - Melatonin qHS  - Seroquel 12.5mg qHS    FEN: feeding tube, and dysphagia level II  Code: FULL  PPx: PPI 40mg BID, senna PRN, warfarin  Dispo: pending stability    LINES:  - LUE PICC   4/20  - Chavis catheter  5/3  - Lt IJ                            4/29    Plan of care discussed with Dr. Jamison Camilo MD, MPH  PGY-3, Internal Medicine   [phone]

## 2017-05-08 NOTE — PROGRESS NOTES
[facility]  WO Nurse Inpatient Adult Pressure INJURY (PI) Wound Assessment     Follow up assessment of PU(s) on pt's:   Coccyx    Data:   Patient History:      per MD note(s): 51 year old man presented with cardiogenic shock from inferior STEMI s/p RCA aspiration thrombectomy and POBA on 3/26 at [facility] s/p IABP and initiation of Dopamine, also during procedure, CHB s/p temp pacer, emesis/hematmesis and altered mental status s/p intubation transferred here for consideration of mechanical support on 3/27. Had PCI to RCA, LAD and LCx (on ASA and plavix) started on epinephrine in addition to dopamine, post procedure developed distributive shock picture from infection (aspiration pneumonia? Sputum cx growing staph aureus, on vanco and zosyn) vs post-MI SIRS response. Currently in cardiogenic shock with IABP in situ    Patient continually scooting down in bed    Current mattress: Pulsate  Current pressure relieving devices:  Heel lift boots and Pillows, Z flow chair positioner    Moisture Management:  Incontinence Protocol,     Current Diet / Nutrition:       Active Diet Order      NPO    Tube Feeding:     John Assessment and sub scores:   John Score  Av.1  Min: 12  Max: 18   Recent Labs   Lab Test  17   0404   17   0359   ALBUMIN  2.4*   < >  2.2*   HGB  7.6*   < >  7.6*   INR  2.22*   < >   --    WBC  6.8   < >  8.5   A1C   --    --   Below Assay Range   Canceled, Test credited  EMILIE LAW RN @ 1014 17 KLA     CRP   --    --   17.0*    < > = values in this interval not displayed.                                                                                                Pressure Injury Assessment  (location #1):   Coccyx    Wound History:   Coccyx pressure injury continues to evolve, larger and deeper. Now a full thickness Stage 3 pressure injury. Confirmed with Courtney Hodges RN, CWOCN      Photo Coccyx 17                                             Photo, coccyx 17                                                                                                     Wound Base: Pressure injury has expanded superiorly, upper half of wound with red dermal base, lower half is full thickness with exposed adipose. There is loose dry, dark epidermis at 12:00, maceration from 3 to 7:00 and pink scar tissue beginning to form    Specific Dimensions (length x width x depth, in cm) :   3.5 x 1.8 x 0.3 cm    Palpation of the wound bed:  Bone is not palpable    Slough appearance:  none    Periwound Skin: intact,      Color: normal and consistent with surrounding tissue    Temperature  normal     Drainage:    Amount: scant,  Color: serous       Odor: none    Pain: sharp, persistent. Morphine gel is being applied         Intervention:     Patient's chart evaluated.      John Interventions:  Current John Interventions and Care Plan reviewed and updated, appropriate at this time.    Wound was assessed.    Wound Care: was done:     Visual inspection    Cleansing with NS Microklenz    Application of Mepilex dressing    Orders  Reviewed    Supplies  N/A    Discussed plan of care with Nurse and daughter           Assessment:     Pressure Injury (PI) located on Coccyx: 3    Status: continues to evolve, now larger and deeper             Plan:     Nursing to notify the Provider(s) and re-consult the WOC Nurse if wound(s) deteriorate(s).  Plan of care for wound located on Coccyx: Clean wound with Microklenz, Cover with Mepilex dressing changer daily  WOC Nurse will return: Mondays and Thursday  Face to face time: 20 minutes

## 2017-05-08 NOTE — BRIEF OP NOTE
[facility], [place]    Brief Operative Note    Pre-operative diagnosis: Heart Failure  Post-operative diagnosis Heart Failure  Procedure: Procedure(s):  Right Subclavian Graft Removal  - Wound Class: I-Clean  Surgeon: Surgeon(s) and Role:     * Keshav Leung MD - Primary     * Chanell Sin PA-C - Assisting  Anesthesia: General   Estimated blood loss: 5 ml  Drains: None  Specimens:   ID Type Source Tests Collected by Time Destination   1 : subclavian artery graft, gross. chain of custody Other (specify in comments) Other OR DOCUMENTATION ONLY Keshav Leung MD 5/8/2017  5:18 PM      Findings:   None.  Complications: None.  Implants: None.

## 2017-05-08 NOTE — PLAN OF CARE
Problem: Goal Outcome Summary  Goal: Goal Outcome Summary  1) pt will be hemodynamically stable  2) pt will tolerate weaning of IABP  3) pain will be adequately controlled     Outcome: No Change  D: Post Mitral Clip + scheduled arterial sheath removal for today  I/A: Heparin stopped at 1100 per MD. 1 unit RBC started at 1500 + vitamin K for INR of 2.2 this AM. NPO since 0000. HD monitoring q4h via IJ line.CVPs 16-17, bumex increased to TID. Chavis out yesteday, unable to void spontaneously. Pt reported ability to dribble urine on last strain to void. Straight cath x2 this shift with 700-800 mL urine out each time. Activity and mobility encouraged, up to chair x1 today. Frequent movement while in bed/chair. Frequent repositioning. WOC nurse here today to see P.U. On coccyx, new orders placed.  scheduled regularly in AM for rounds and PRN. Family at bedside for majority of shift.  R: To OR for subclavian arterial sheath removal at 1600. Plan to go to 6C after if stable. Continue to update MD and family with changes.

## 2017-05-08 NOTE — PROGRESS NOTES
Date: 5/8/2017    Time of Call: 12:14 PM     Diagnosis:  Mitral valve regurgitation     [ TORB ] Ordering provider: Ron Cortez MD  Order:   1.  Echo  2.  Labs  3.  EKG  Pt also needs to be scheduled with Dr. Cortez for a follow up appointment.     Order received by: Luz Philip RN     Follow-up/additional notes:   One month s/p MitraClip procedure orders placed.

## 2017-05-08 NOTE — ANESTHESIA PROCEDURE NOTES
Arterial Line Procedure Note  Staff:     Anesthesiologist:  SERENE ERICKSON  Location: In OR Before Induction  Procedure Start/Stop Times:     patient identified, IV checked, site marked, risks and benefits discussed, informed consent, monitors and equipment checked, pre-op evaluation and at physician/surgeon's request      Correct Patient: Yes      Correct Position: Yes      Correct Site: Yes      Correct Procedure: Yes      Correct Laterality:  Yes    Site Marked:  Yes  Line Placement:     Procedure:  Arterial Line    Insertion Site:  Brachial    Insertion laterality:  Right    Skin Prep: Chloraprep      Patient Prep: patient draped, mask, sterile gloves, hat and hand hygiene      Local skin infiltration:  1% lidocaine    amount (mL):  2    Ultrasound Guided?: Yes      Artery evaluated via ultrasound confirming patency.   Using realtime imaging, the artery was punctured and the needle was observed entering the artery.      A permanent image is NOT entered into the patient's record.      Catheter size:  20 gauge, 12 cm    Cath secured with: anchor securement device      Dressing:  Tegaderm    Complications:  None obvious    Arterial waveform: Yes      IBP within 10% of NIBP: Yes

## 2017-05-08 NOTE — PROGRESS NOTES
CVTS:     INR 2.2 today. Given Hgb 7.6 and quick INR rise, we ordered 1 unit PRBCs and 1 mg Vit K.   Recheck INR at 3:30 pm (or after 1 unit and Vit K given).   Plan is still to go to OR for graft removal today at this time.   May still delay to tomorrow if current case is running late.   Patient remains NPO.     Can start heparin gtt if case is postponed until tomorrow and INR < 2.       Hermes Crawford PA-C  Cardiothoracic Surgery  Pager [phone]    2:27 PM   May 8, 2017

## 2017-05-08 NOTE — PLAN OF CARE
Problem: Goal Outcome Summary  Goal: Goal Outcome Summary  1) pt will be hemodynamically stable  2) pt will tolerate weaning of IABP  3) pain will be adequately controlled     ST 4E: Cx- Pt NPO for possible OR.

## 2017-05-08 NOTE — PLAN OF CARE
Problem: Goal Outcome Summary  Goal: Goal Outcome Summary  1) pt will be hemodynamically stable  2) pt will tolerate weaning of IABP  3) pain will be adequately controlled     PT 4E: Pt limited during today's session 2/2 to c/o dizziness. Engaged in bed mobility, transfer, pre-gait, and LE strength training. Requires mod A supine>sit, mod A x2 for sit<>stand with HHA, and min A x2 for bed>chair with HHA. Poor tolerance for pre-gait and strengthening activities 2/2 to dizziness - BP taken and stable. Hemodynamically stable throughout on RA. Limited 2/2 to poor command following, moderate impulsivity, decreased functional strength, and poor activity tolerance. Continues to benefit from skilled PT to address stated deficits in order to progress towards IND and safe PLOF.     REC: IP rehab once medically stable.

## 2017-05-08 NOTE — ANESTHESIA POSTPROCEDURE EVALUATION
Patient: Luke Henao    Procedure(s):  Right Subclavian Graft Removal  - Wound Class: I-Clean    Diagnosis:Heart Failure  Diagnosis Additional Information: No value filed.    Anesthesia Type:  No value filed.    Note:  Anesthesia Post Evaluation    Patient location during evaluation: PACU             Last vitals:  Vitals:    05/08/17 1500 05/08/17 1550 05/08/17 1600   BP: 100/78     Resp: 21 18 12   Temp:  36.9  C (98.4  F)    SpO2: 98%  100%         Electronically Signed By: Mitchell Wills MD  May 8, 2017  6:13 PM

## 2017-05-08 NOTE — ANESTHESIA POSTPROCEDURE EVALUATION
Patient: Luke Henao    Procedure(s):  Right Subclavian Graft Removal  - Wound Class: I-Clean    Diagnosis:Heart Failure  Diagnosis Additional Information: No value filed.    Anesthesia Type:  No value filed.    Note:  Anesthesia Post Evaluation    Patient location during evaluation: PACU  Patient participation: Able to fully participate in evaluation  Level of consciousness: awake and alert  Pain management: adequate  Airway patency: patent  Cardiovascular status: acceptable and hemodynamically stable  Respiratory status: acceptable  Hydration status: acceptable  PONV: none     Anesthetic complications: None          Last vitals:  Vitals:    05/08/17 1500 05/08/17 1550 05/08/17 1600   BP: 100/78     Resp: 21 18 12   Temp:  36.9  C (98.4  F)    SpO2: 98%  100%         Electronically Signed By: Mitchell Wills MD  May 8, 2017  6:14 PM

## 2017-05-09 ENCOUNTER — APPOINTMENT (OUTPATIENT)
Dept: PHYSICAL THERAPY | Facility: CLINIC | Age: 52
DRG: 228 | End: 2017-05-09
Attending: INTERNAL MEDICINE
Payer: COMMERCIAL

## 2017-05-09 ENCOUNTER — APPOINTMENT (OUTPATIENT)
Dept: OCCUPATIONAL THERAPY | Facility: CLINIC | Age: 52
DRG: 228 | End: 2017-05-09
Attending: INTERNAL MEDICINE
Payer: COMMERCIAL

## 2017-05-09 LAB
ANION GAP SERPL CALCULATED.3IONS-SCNC: 6 MMOL/L (ref 3–14)
APTT PPP: 37 SEC (ref 22–37)
BASE EXCESS BLDV CALC-SCNC: 3.4 MMOL/L
BASE EXCESS BLDV CALC-SCNC: 5.4 MMOL/L
BASE EXCESS BLDV CALC-SCNC: 5.8 MMOL/L
BUN SERPL-MCNC: 21 MG/DL (ref 7–30)
CA-I BLD-MCNC: 4.6 MG/DL (ref 4.4–5.2)
CALCIUM SERPL-MCNC: 7.9 MG/DL (ref 8.5–10.1)
CHLORIDE SERPL-SCNC: 100 MMOL/L (ref 94–109)
CO2 SERPL-SCNC: 29 MMOL/L (ref 20–32)
CREAT SERPL-MCNC: 0.79 MG/DL (ref 0.66–1.25)
ERYTHROCYTE [DISTWIDTH] IN BLOOD BY AUTOMATED COUNT: 19 % (ref 10–15)
GFR SERPL CREATININE-BSD FRML MDRD: ABNORMAL ML/MIN/1.7M2
GLUCOSE BLDC GLUCOMTR-MCNC: 139 MG/DL (ref 70–99)
GLUCOSE BLDC GLUCOMTR-MCNC: 144 MG/DL (ref 70–99)
GLUCOSE BLDC GLUCOMTR-MCNC: 144 MG/DL (ref 70–99)
GLUCOSE BLDC GLUCOMTR-MCNC: 151 MG/DL (ref 70–99)
GLUCOSE BLDC GLUCOMTR-MCNC: 202 MG/DL (ref 70–99)
GLUCOSE BLDC GLUCOMTR-MCNC: 219 MG/DL (ref 70–99)
GLUCOSE SERPL-MCNC: 132 MG/DL (ref 70–99)
HCO3 BLDV-SCNC: 29 MMOL/L (ref 21–28)
HCO3 BLDV-SCNC: 31 MMOL/L (ref 21–28)
HCO3 BLDV-SCNC: 31 MMOL/L (ref 21–28)
HCT VFR BLD AUTO: 29.3 % (ref 40–53)
HGB BLD-MCNC: 9.1 G/DL (ref 13.3–17.7)
INR PPP: 1.53 (ref 0.86–1.14)
MAGNESIUM SERPL-MCNC: 2.1 MG/DL (ref 1.6–2.3)
MCH RBC QN AUTO: 29.7 PG (ref 26.5–33)
MCHC RBC AUTO-ENTMCNC: 31.1 G/DL (ref 31.5–36.5)
MCV RBC AUTO: 96 FL (ref 78–100)
O2/TOTAL GAS SETTING VFR VENT: 21 %
O2/TOTAL GAS SETTING VFR VENT: ABNORMAL %
O2/TOTAL GAS SETTING VFR VENT: ABNORMAL %
OXYHGB MFR BLDV: 54 %
OXYHGB MFR BLDV: 63 %
OXYHGB MFR BLDV: 66 %
PCO2 BLDV: 46 MM HG (ref 40–50)
PCO2 BLDV: 50 MM HG (ref 40–50)
PCO2 BLDV: 50 MM HG (ref 40–50)
PH BLDV: 7.4 PH (ref 7.32–7.43)
PHOSPHATE SERPL-MCNC: 4.3 MG/DL (ref 2.5–4.5)
PLATELET # BLD AUTO: 322 10E9/L (ref 150–450)
PO2 BLDV: 33 MM HG (ref 25–47)
PO2 BLDV: 38 MM HG (ref 25–47)
PO2 BLDV: 39 MM HG (ref 25–47)
POTASSIUM SERPL-SCNC: 3.9 MMOL/L (ref 3.4–5.3)
RBC # BLD AUTO: 3.06 10E12/L (ref 4.4–5.9)
SODIUM SERPL-SCNC: 136 MMOL/L (ref 133–144)
WBC # BLD AUTO: 7.7 10E9/L (ref 4–11)

## 2017-05-09 PROCEDURE — 97112 NEUROMUSCULAR REEDUCATION: CPT | Mod: GP

## 2017-05-09 PROCEDURE — 25000132 ZZH RX MED GY IP 250 OP 250 PS 637: Performed by: INTERNAL MEDICINE

## 2017-05-09 PROCEDURE — 84132 ASSAY OF SERUM POTASSIUM: CPT | Performed by: PHYSICIAN ASSISTANT

## 2017-05-09 PROCEDURE — 99233 SBSQ HOSP IP/OBS HIGH 50: CPT | Mod: GC | Performed by: INTERNAL MEDICINE

## 2017-05-09 PROCEDURE — 85610 PROTHROMBIN TIME: CPT | Performed by: PHYSICIAN ASSISTANT

## 2017-05-09 PROCEDURE — 97110 THERAPEUTIC EXERCISES: CPT | Mod: GO

## 2017-05-09 PROCEDURE — 97535 SELF CARE MNGMENT TRAINING: CPT | Mod: GO

## 2017-05-09 PROCEDURE — 25000132 ZZH RX MED GY IP 250 OP 250 PS 637: Performed by: STUDENT IN AN ORGANIZED HEALTH CARE EDUCATION/TRAINING PROGRAM

## 2017-05-09 PROCEDURE — 84100 ASSAY OF PHOSPHORUS: CPT | Performed by: PHYSICIAN ASSISTANT

## 2017-05-09 PROCEDURE — 00000146 ZZHCL STATISTIC GLUCOSE BY METER IP

## 2017-05-09 PROCEDURE — 40000133 ZZH STATISTIC OT WARD VISIT

## 2017-05-09 PROCEDURE — 40000193 ZZH STATISTIC PT WARD VISIT

## 2017-05-09 PROCEDURE — 85027 COMPLETE CBC AUTOMATED: CPT | Performed by: PHYSICIAN ASSISTANT

## 2017-05-09 PROCEDURE — 25000132 ZZH RX MED GY IP 250 OP 250 PS 637

## 2017-05-09 PROCEDURE — 25000125 ZZHC RX 250: Performed by: STUDENT IN AN ORGANIZED HEALTH CARE EDUCATION/TRAINING PROGRAM

## 2017-05-09 PROCEDURE — 80048 BASIC METABOLIC PNL TOTAL CA: CPT | Performed by: PHYSICIAN ASSISTANT

## 2017-05-09 PROCEDURE — 83735 ASSAY OF MAGNESIUM: CPT | Performed by: PHYSICIAN ASSISTANT

## 2017-05-09 PROCEDURE — 82330 ASSAY OF CALCIUM: CPT | Performed by: PHYSICIAN ASSISTANT

## 2017-05-09 PROCEDURE — 97530 THERAPEUTIC ACTIVITIES: CPT | Mod: GP

## 2017-05-09 PROCEDURE — 21400006 ZZH R&B CCU INTERMEDIATE UMMC

## 2017-05-09 PROCEDURE — 85730 THROMBOPLASTIN TIME PARTIAL: CPT | Performed by: PHYSICIAN ASSISTANT

## 2017-05-09 PROCEDURE — 25000128 H RX IP 250 OP 636: Performed by: PHYSICIAN ASSISTANT

## 2017-05-09 PROCEDURE — 27210437 ZZH NUTRITION PRODUCT SEMIELEM INTERMED LITER

## 2017-05-09 PROCEDURE — 82805 BLOOD GASES W/O2 SATURATION: CPT | Performed by: PHYSICIAN ASSISTANT

## 2017-05-09 RX ORDER — MULTIPLE VITAMINS W/ MINERALS TAB 9MG-400MCG
1 TAB ORAL DAILY
Status: DISCONTINUED | OUTPATIENT
Start: 2017-05-10 | End: 2017-05-12 | Stop reason: HOSPADM

## 2017-05-09 RX ORDER — PANTOPRAZOLE SODIUM 40 MG/1
40 TABLET, DELAYED RELEASE ORAL 2 TIMES DAILY
Status: DISCONTINUED | OUTPATIENT
Start: 2017-05-09 | End: 2017-05-12 | Stop reason: HOSPADM

## 2017-05-09 RX ORDER — WARFARIN SODIUM 4 MG/1
4 TABLET ORAL
Status: COMPLETED | OUTPATIENT
Start: 2017-05-09 | End: 2017-05-09

## 2017-05-09 RX ADMIN — BUMETANIDE 2 MG: 2 TABLET ORAL at 19:47

## 2017-05-09 RX ADMIN — INSULIN ASPART 4 UNITS: 100 INJECTION, SOLUTION INTRAVENOUS; SUBCUTANEOUS at 09:34

## 2017-05-09 RX ADMIN — DIGOXIN 125 MCG: 0.12 TABLET ORAL at 09:31

## 2017-05-09 RX ADMIN — LISINOPRIL 10 MG: 10 TABLET ORAL at 19:47

## 2017-05-09 RX ADMIN — LISINOPRIL 10 MG: 10 TABLET ORAL at 09:31

## 2017-05-09 RX ADMIN — CEFAZOLIN SODIUM 1 G: 1 INJECTION, POWDER, FOR SOLUTION INTRAMUSCULAR; INTRAVENOUS at 01:33

## 2017-05-09 RX ADMIN — INSULIN GLARGINE 25 UNITS: 100 INJECTION, SOLUTION SUBCUTANEOUS at 09:34

## 2017-05-09 RX ADMIN — MULTIVIT AND MINERALS-FERROUS GLUCONATE 9 MG IRON/15 ML ORAL LIQUID 15 ML: at 09:31

## 2017-05-09 RX ADMIN — CEFAZOLIN SODIUM 1 G: 1 INJECTION, POWDER, FOR SOLUTION INTRAMUSCULAR; INTRAVENOUS at 17:04

## 2017-05-09 RX ADMIN — WARFARIN SODIUM 4 MG: 4 TABLET ORAL at 17:55

## 2017-05-09 RX ADMIN — INSULIN ASPART 3 UNITS: 100 INJECTION, SOLUTION INTRAVENOUS; SUBCUTANEOUS at 12:12

## 2017-05-09 RX ADMIN — Medication 12.5 MG: at 21:16

## 2017-05-09 RX ADMIN — PANTOPRAZOLE SODIUM 40 MG: 40 TABLET, DELAYED RELEASE ORAL at 09:34

## 2017-05-09 RX ADMIN — CLOPIDOGREL 75 MG: 75 TABLET, FILM COATED ORAL at 09:31

## 2017-05-09 RX ADMIN — ATORVASTATIN CALCIUM 40 MG: 40 TABLET, FILM COATED ORAL at 19:47

## 2017-05-09 RX ADMIN — OXYCODONE HYDROCHLORIDE 10 MG: 5 TABLET ORAL at 07:09

## 2017-05-09 RX ADMIN — CEFAZOLIN SODIUM 1 G: 1 INJECTION, POWDER, FOR SOLUTION INTRAMUSCULAR; INTRAVENOUS at 09:32

## 2017-05-09 RX ADMIN — POTASSIUM CHLORIDE 20 MEQ: 29.8 INJECTION, SOLUTION INTRAVENOUS at 05:25

## 2017-05-09 RX ADMIN — Medication: at 12:17

## 2017-05-09 RX ADMIN — BUMETANIDE 2 MG: 2 TABLET ORAL at 09:31

## 2017-05-09 RX ADMIN — PANTOPRAZOLE SODIUM 40 MG: 40 TABLET, DELAYED RELEASE ORAL at 21:17

## 2017-05-09 RX ADMIN — OXYCODONE HYDROCHLORIDE 10 MG: 5 TABLET ORAL at 19:47

## 2017-05-09 RX ADMIN — BUMETANIDE 2 MG: 2 TABLET ORAL at 14:10

## 2017-05-09 ASSESSMENT — PAIN DESCRIPTION - DESCRIPTORS: DESCRIPTORS: SORE

## 2017-05-09 NOTE — PROGRESS NOTES
CVTS:     Wound stable.   Some disorientation.   Transferring to  today.    BMP    Recent Labs  Lab 05/09/17  0358 05/08/17  2034 05/08/17  1712 05/08/17  0404 05/07/17  1614    134 135 134 135   POTASSIUM 3.9 4.0 3.9 3.9 4.2   CHLORIDE 100 100  --  98 101   ROB 7.9* 7.9*  --  7.9* 7.6*   CO2 29 30  --  30 29   BUN 21 20  --  23 22   CR 0.79 0.84  --  0.77 0.81   * 90 143* 120* 178*     CBC    Recent Labs  Lab 05/09/17 0358 05/08/17 1712 05/08/17 0404 05/07/17 0357 05/06/17  0341   WBC 7.7  --  6.8 7.4 8.9   RBC 3.06*  --  2.57* 2.58* 2.46*   HGB 9.1* 9.9* 7.6* 7.8* 7.5*   HCT 29.3*  --  25.3* 25.7* 25.1*   MCV 96  --  98 100 102*   MCH 29.7  --  29.6 30.2 30.5   MCHC 31.1*  --  30.0* 30.4* 29.9*   RDW 19.0*  --  18.5* 18.8* 19.3*     --  368 370 361     INR    Recent Labs  Lab 05/09/17 0358 05/08/17  0404 05/07/17  0357 05/06/17  1516   INR 1.53* 2.22* 1.25* 1.14      Hepatic Panel   Lab Results   Component Value Date    AST 25 05/08/2017     Lab Results   Component Value Date    ALT 30 05/08/2017     Lab Results   Component Value Date    ALBUMIN 2.4 05/08/2017     A/P:   S/P R subclavian graft removal.     - Stable WBC, Hgb and Plts.    - INR 1.53. OK to resume coumadin.   - Cares per Cards II primary team.       Hermes Crawford PA-C  Cardiothoracic Surgery  Pager [phone]    5:55 PM   May 9, 2017

## 2017-05-09 NOTE — PLAN OF CARE
Problem: Goal Outcome Summary  Goal: Goal Outcome Summary  1) pt will be hemodynamically stable  2) pt will tolerate weaning of IABP  3) pain will be adequately controlled     Outcome: Improving  Patient lethargic and once in a while disoriented to place. Follows commands appropriately. Room air. Lung sounds clear, diminished. SR-ST. BP /60-70. Pulses 2+. CVP 16 at 0800.  Diuresing well after PO bumex.  Up to chair and working with therapy.  Patient stating that he has pain on his coccyx; Morphine gel and new mepilex applied. Plan to transfer to  today.

## 2017-05-09 NOTE — PROGRESS NOTES
Calorie Counts  Intake recorded for: 5/8  Kcals: 0  Protein: 0g  # Meals Recorded: no meals ordered from kitchen, no intake recorded. NPO for most of the day.   # Supplements Recorded: no intake recorded.

## 2017-05-09 NOTE — PLAN OF CARE
Problem: Goal Outcome Summary  Goal: Goal Outcome Summary  1) pt will be hemodynamically stable  2) pt will tolerate weaning of IABP  3) pain will be adequately controlled     PT 4E: Pt with much improved activity tolerance and participation with therapy today. Oriented only to self and partially place. Believes he is in a hospital in [place] and had surgery this morning. Time taken to reorient pt. Engaged in bed mobility, transfer, standing tolerance, and dynamic standing balance training. Requires mod A for supine>sit, mod A x1 for sit<>stand transfer with HHA, min A x2 for bed>chair transfer with HHA, and mod A with 2 UE support for standing dynamic balance activities. Mildly impulsive and with poor safety awareness, however easily redirectable today. Hemodynamically stable throughout on RA. Limited 2/2 to functional strength/balance deficits, poor motor planning, and decreased activity tolerance from baseline.      REC: ARU once medically stable.

## 2017-05-09 NOTE — OR NURSING
"Blood pressures 130s/110s in PACU. Writer spoke with Dr. Wills regarding these blood pressures, which are higher than pt's baseline. Dr. Wills stated, \"That's fine. Higher blood pressures in this patient are better than lower.\" pt will transfer to  when receiving RN available. Continue to monitor vitals.   "

## 2017-05-09 NOTE — PROGRESS NOTES
CLINICAL NUTRITION SERVICES - REASSESSMENT NOTE     Nutrition Prescription    RECOMMENDATIONS FOR MDs/PROVIDERS TO ORDER:  1. Avoid interruptions to EN infusion to promote adequate kcal/PRO intake.   2. Diet order per SLP    Malnutrition Status:    Non-severe malnutrition in the context of acute illness    Recommendations already ordered by Registered Dietitian (RD):  1. Continue Peptamen 1.5 @ 50 ml/hr (1200 ml/day) to provide 1800 kcal (32 kcal/kg + pending PO), 82 g PRO (1.5 g/kg + pending PO), 924 ml free H2O, 67 g Fat (70% from MCTs), 226 g CHO and no Fiber daily per dosing weight of 56 kg    2. D/C Ensure and Magic Cups    Future/Additional Recommendations:  1. Once pt is consuming 25% of meals or ~500 kcal and 30 g PRO, then rec transition to cycled TFs of Peptamen 1.5 @ 65 mL/hr x 12 hrs. This will provide 1170 kcal (84% minimum needs) and 53 g PRO (60% minimum needs).   2. Continue to monitor WOC notes for wound healing progress and for need to repeat Vitamin A, Vitamin C and Zinc supplementation.   3. If PO protein intakes are minimal per calorie counts, order 1 pkt Pro-Stat daily for additional 100 kcal and 15 g PRO to optimize PRO intakes for wound healing.      EVALUATION OF THE PROGRESS TOWARD GOALS   Diet: NPO for procedure. Pt was NPO yesterday as well. Previously, pt was on DD2 with thin liquids on 5/5-5/7.     EN Access: NDT replaced yesterday by radiology after FT pulled out on 4/30.     Nutrition Support: Peptamen 1.5 @ goal 50 ml/hr.     TF Intake: Average 7-day intake = 999 mL Peptamen 1.5 to provide 1499 kcal (27 kcal/kg) and 68 g PRO (1.2 g/kg) + pending PO intakes (unknown). Pt did receive 100% of goal TF volumes 4 out of past 7 days. TF held yesterday for procedure.    PO intake: no PO intakes yet recorded as NPO most of yesterday and no intakes recorded 5/7. Per family, pt has been eating some food from home and does not like the supplements (Ensure or Magic Cups)    NEW FINDINGS   Skin:  PER WOC on 5/8, PI on coccyx evolving and now stage 3. Receiving daily multivitamin with minerals and received 10 day course of Tri-vi-sol 7 ml BID and zinc sulfate 220 mg/day 4/18 - 4/28.  GI: Variable stooling. no BM recorded yesterday. Previously had 4 BM on 5/7, 10 BM on 5/6 and 2 BM on 5/5.     Updated ASSESSED NUTRITION NEEDS (dosing weight of 56 kg)  Estimated Protein Needs: 84 - 112 grams protein/day (1.5 - 2 grams of pro/kg)  Justification: Wound healing for stage 3 PI    MALNUTRITION  % Intake: < 75% for > 7 days (non-severe)  % Weight Loss: None noted (wt today of 58.4 kg > lowest wt 56.2 kg on 4/11)  Subcutaneous Fat Loss: Upper arm and Lower arm: mild  Muscle Loss: Upper arm (bicep, tricep), Lower arm (forearm), Upper leg (quadricep, hamstring), Patellar region and Posterior calf: moderate  Fluid Accumulation/Edema: Trace  Malnutrition Diagnosis: Non-severe malnutrition in the context of acute illness    Previous Goals   Total avg nutritional intake to meet a minimum of 25 kcal/kg and 1.2 g PRO/kg daily (per dosing wt 56 kg).  Evaluation: Met    Previous Nutrition Diagnosis  Inadequate protein-energy intake related to interruptions to EN infusion as evidenced by average 7-day intake met 67% kcal and 63% PRO needs.     Evaluation: Improving/resolved, however suspect has higher protein needs    CURRENT NUTRITION DIAGNOSIS  Predicted inadequate nutrient intake (kcal/PRO, micronutrients for wound healing) related to reliance on TF to meet needs until improvement in PO intake (and w/ unknown adequacy of PO intakes), increased needs for wound healing AEB met 100% of estimated needs last week but worsening PI to stage 3 and potential for interruptions to EN infusion and/or inadequate PO intakes    INTERVENTIONS  Implementation  Enteral Nutrition - no change today but future recommendations are above.    Goals  Total avg nutritional intake to meet a minimum of 25 kcal/kg and 1.2 g PRO/kg daily (per dosing wt 56  kg).    Monitoring/Evaluation  Progress toward goals will be monitored and evaluated per protocol.    Paula Guerrero RD, LD  CVICU Dietitian  Pager: [phone]

## 2017-05-09 NOTE — PLAN OF CARE
Problem: Goal Outcome Summary  Goal: Goal Outcome Summary  1) pt will be hemodynamically stable  2) pt will tolerate weaning of IABP  3) pain will be adequately controlled     Outcome: Improving  D/A/I:  Patient transferred from , disoriented to time, knew his name but not his birthday.  Reoriented patient as needed.  Denied pain, palpitations, difficulty breathing, SOB, dizziness, and nausea.  Lung sounds diminished in bases, had +1 generalized edema.  Was given scheduled scheduled bumetanide, had good urine output via dawkins catheter, see I&O flowsheet.  Reported no BM since 5/7, bowel sounds hyperactive, passing flatus.  Sacral wound moist and pink with white areas, reinforced dressing.  Left groin incision's liquid bandage had rubbed off, site moist with yellow areas.  Cleaned wound with wound cleanser, covered with nonadherent dressing.  In sinus tachycardia with HR 100s-110s, SBP 100s, SaO2 95-99% on 2L O2 via nasal cannula.  Patient also connected to capnography.  Tube feed continued at 50 ml/hr with 30 ml free water flushes q4hr.  Patient was repositioned in bed regularly with 1-2 person assist.  Daughter stayed with patient during shift.     P:  Continue to monitor pain, VS, heart rhythm, incision and wound integrity, fluid status, bowel status, cardiac and respiratory status.  Notify care team of changes in patient condition or other concerns.  Reposition regularly to promote skin integrity.

## 2017-05-09 NOTE — PLAN OF CARE
Problem: Goal Outcome Summary  Goal: Goal Outcome Summary  1) pt will be hemodynamically stable  2) pt will tolerate weaning of IABP  3) pain will be adequately controlled     Outcome: No Change  Pt s/p R subclavian IABP removal. Arrived to floor at 2000 in stable condition. lethargic post-procedure but now alert, oriented x4. Calm, flat affect. C/o back pain, relieved with repositioning. Afebrile. % on 2 lpm for majority of time but had frequent brief apneic episodes while sleeping with de-satting to mid-70's. Infrequent dry cough. LS coarse throughout.  IABP site covered with dressing--clean, dry and intact. Sinus tach with HR low 100's, no ectopy observed. DBP elevated upon arrival to the floor but BP became soft after Bumex and Lisinopril given.  MAP maintained >65. CVP 17, 16, 14. SvO2 in the 60's. Tube feeding resumed upon arrival to floor. Ate food brought by wife this am. Chavis in place, urine output 850 cc since Bumex given. No BM. Mepilex in place on coccyx wound. Assisted in turning every 2 hours. K+ replaced per protocol.

## 2017-05-09 NOTE — PROGRESS NOTES
"Surgery Brief Post-Op Check  Luke Henao MRN: [id number]    S: Patient sleeping comfortably. RN reports patient has had good pain control; no nausea, vomiting.    O: BP 95/70  Temp 98.4  F (36.9  C) (Oral)  Resp 13  Ht 1.575 m (5' 2.01\")  Wt 60 kg (132 lb 4.4 oz)  SpO2 100%  BMI 24.19 kg/m2    UOP: avg ~40 cc last 5 hours since OR    No acute distress  Non labored respirations on 2L NCO2  Regular rate, regular rhythm, peripheral pulses 2+  Incision with sterile dressing in place, no hematoma appreciated  Abdomen soft, non tender, non distended  Chavis in place, clear traci urine in bag  Extremities warm, well perfused  Alert, oriented    Post-op labs:   - Na 134  - Ca 7.9    Exam: XR CHEST PORT 1 VW, 5/8/2017 6:29 PM  1. Accounting for technical differences, unchanged bilateral perihilar  and interstitial opacities, favor pulmonary edema less likely  infection.  2. Stable support devices.      A/P: 51 year old male POD#0 s/p removal of right subclavian artery graft. No acute issues at this time.     - Continue current pain regimen: PRN oxy and tylenol  - Monitor vitals per protocol  - Wean O2 as able, encourage IS  - Adequate UOP, continue to monitor  - TF @ 50  - PRN Zofran  - Continued cares per CVTS day team.    - - - - - - - - - - - -  Toby Bourgeois MD  PGY-1, General Surgery  [facility]  "

## 2017-05-09 NOTE — PLAN OF CARE
Problem: Goal Outcome Summary  Goal: Goal Outcome Summary  1) pt will be hemodynamically stable  2) pt will tolerate weaning of IABP  3) pain will be adequately controlled     OT 6C: Pt demonstrating improved participation and activity tolerance during today's session. Pt seen for therapeutic exercise and functional transfers. Pt oriented person however disoriented to place. Pt with intermittent confusion however redirectable. Pt completed sit <> stand with mod A and transferred bed > chair with min A x2. Pt dangled EOB for UE strengthening. Pt required rest breaks throughout due to SOB and fatigue. Pt's VSS throughout.      Rec ARU to address cognitive deficits, weakness, and balance deficits leading to decreased ADL I.

## 2017-05-09 NOTE — ANESTHESIA POSTPROCEDURE EVALUATION
Patient: Luke Henao    Procedure(s):  Right Subclavian Graft Removal  - Wound Class: I-Clean    Diagnosis:Heart Failure  Diagnosis Additional Information: No value filed.    Anesthesia Type:  No value filed.    Note:  Anesthesia Post Evaluation    Patient location during evaluation: PACU  Patient participation: Able to fully participate in evaluation  Level of consciousness: awake and alert  Pain management: adequate  Airway patency: patent  Cardiovascular status: acceptable and hemodynamically stable  Respiratory status: acceptable  Hydration status: acceptable  PONV: none     Anesthetic complications: None          Last vitals:  Vitals:    05/08/17 1815 05/08/17 1820 05/08/17 1830   BP: (!) 138/93 (!) 138/93 (!) 142/107   Resp: 14 14 14   Temp:      SpO2: 100% 100% 100%         Electronically Signed By: Mitchell Wills MD  May 8, 2017  7:03 PM

## 2017-05-09 NOTE — OR NURSING
Chest x ray performed in PACU, Chanell MOCTEZUMA stated she will review chest x ray. She stated that pt may transfer upstairs.

## 2017-05-10 ENCOUNTER — APPOINTMENT (OUTPATIENT)
Dept: PHYSICAL THERAPY | Facility: CLINIC | Age: 52
DRG: 228 | End: 2017-05-10
Attending: INTERNAL MEDICINE
Payer: COMMERCIAL

## 2017-05-10 ENCOUNTER — APPOINTMENT (OUTPATIENT)
Dept: SPEECH THERAPY | Facility: CLINIC | Age: 52
DRG: 228 | End: 2017-05-10
Attending: INTERNAL MEDICINE
Payer: COMMERCIAL

## 2017-05-10 ENCOUNTER — APPOINTMENT (OUTPATIENT)
Dept: OCCUPATIONAL THERAPY | Facility: CLINIC | Age: 52
DRG: 228 | End: 2017-05-10
Attending: INTERNAL MEDICINE
Payer: COMMERCIAL

## 2017-05-10 LAB
ANION GAP SERPL CALCULATED.3IONS-SCNC: 11 MMOL/L (ref 3–14)
ANION GAP SERPL CALCULATED.3IONS-SCNC: 6 MMOL/L (ref 3–14)
APTT PPP: 38 SEC (ref 22–37)
BUN SERPL-MCNC: 22 MG/DL (ref 7–30)
BUN SERPL-MCNC: 25 MG/DL (ref 7–30)
CA-I BLD-MCNC: 4.4 MG/DL (ref 4.4–5.2)
CALCIUM SERPL-MCNC: 8.4 MG/DL (ref 8.5–10.1)
CALCIUM SERPL-MCNC: 8.4 MG/DL (ref 8.5–10.1)
CHLORIDE SERPL-SCNC: 94 MMOL/L (ref 94–109)
CHLORIDE SERPL-SCNC: 98 MMOL/L (ref 94–109)
CO2 SERPL-SCNC: 28 MMOL/L (ref 20–32)
CO2 SERPL-SCNC: 31 MMOL/L (ref 20–32)
CREAT SERPL-MCNC: 0.79 MG/DL (ref 0.66–1.25)
CREAT SERPL-MCNC: 0.84 MG/DL (ref 0.66–1.25)
ERYTHROCYTE [DISTWIDTH] IN BLOOD BY AUTOMATED COUNT: 18 % (ref 10–15)
GFR SERPL CREATININE-BSD FRML MDRD: ABNORMAL ML/MIN/1.7M2
GFR SERPL CREATININE-BSD FRML MDRD: ABNORMAL ML/MIN/1.7M2
GLUCOSE BLDC GLUCOMTR-MCNC: 130 MG/DL (ref 70–99)
GLUCOSE BLDC GLUCOMTR-MCNC: 179 MG/DL (ref 70–99)
GLUCOSE BLDC GLUCOMTR-MCNC: 186 MG/DL (ref 70–99)
GLUCOSE SERPL-MCNC: 158 MG/DL (ref 70–99)
GLUCOSE SERPL-MCNC: 161 MG/DL (ref 70–99)
HCT VFR BLD AUTO: 31 % (ref 40–53)
HGB BLD-MCNC: 9.3 G/DL (ref 13.3–17.7)
INR PPP: 1.81 (ref 0.86–1.14)
MAGNESIUM SERPL-MCNC: 1.8 MG/DL (ref 1.6–2.3)
MAGNESIUM SERPL-MCNC: 2 MG/DL (ref 1.6–2.3)
MCH RBC QN AUTO: 29.2 PG (ref 26.5–33)
MCHC RBC AUTO-ENTMCNC: 30 G/DL (ref 31.5–36.5)
MCV RBC AUTO: 97 FL (ref 78–100)
PHOSPHATE SERPL-MCNC: 4.2 MG/DL (ref 2.5–4.5)
PLATELET # BLD AUTO: 360 10E9/L (ref 150–450)
POTASSIUM BLD-SCNC: 4.4 MMOL/L (ref 3.4–5.3)
POTASSIUM SERPL-SCNC: 3.9 MMOL/L (ref 3.4–5.3)
POTASSIUM SERPL-SCNC: 3.9 MMOL/L (ref 3.4–5.3)
RBC # BLD AUTO: 3.19 10E12/L (ref 4.4–5.9)
SODIUM SERPL-SCNC: 132 MMOL/L (ref 133–144)
SODIUM SERPL-SCNC: 136 MMOL/L (ref 133–144)
WBC # BLD AUTO: 7.2 10E9/L (ref 4–11)

## 2017-05-10 PROCEDURE — 25000132 ZZH RX MED GY IP 250 OP 250 PS 637: Performed by: INTERNAL MEDICINE

## 2017-05-10 PROCEDURE — 40000133 ZZH STATISTIC OT WARD VISIT

## 2017-05-10 PROCEDURE — 40000802 ZZH SITE CHECK

## 2017-05-10 PROCEDURE — 25000125 ZZHC RX 250: Performed by: INTERNAL MEDICINE

## 2017-05-10 PROCEDURE — 97110 THERAPEUTIC EXERCISES: CPT | Mod: GO

## 2017-05-10 PROCEDURE — 84100 ASSAY OF PHOSPHORUS: CPT | Performed by: INTERNAL MEDICINE

## 2017-05-10 PROCEDURE — 25000132 ZZH RX MED GY IP 250 OP 250 PS 637: Performed by: STUDENT IN AN ORGANIZED HEALTH CARE EDUCATION/TRAINING PROGRAM

## 2017-05-10 PROCEDURE — 82330 ASSAY OF CALCIUM: CPT | Performed by: INTERNAL MEDICINE

## 2017-05-10 PROCEDURE — 83735 ASSAY OF MAGNESIUM: CPT | Performed by: INTERNAL MEDICINE

## 2017-05-10 PROCEDURE — 85027 COMPLETE CBC AUTOMATED: CPT | Performed by: INTERNAL MEDICINE

## 2017-05-10 PROCEDURE — 97530 THERAPEUTIC ACTIVITIES: CPT | Mod: GP | Performed by: PHYSICAL THERAPIST

## 2017-05-10 PROCEDURE — 25000132 ZZH RX MED GY IP 250 OP 250 PS 637

## 2017-05-10 PROCEDURE — 97530 THERAPEUTIC ACTIVITIES: CPT | Mod: GO

## 2017-05-10 PROCEDURE — 99233 SBSQ HOSP IP/OBS HIGH 50: CPT | Mod: GC | Performed by: INTERNAL MEDICINE

## 2017-05-10 PROCEDURE — 40000225 ZZH STATISTIC SLP WARD VISIT

## 2017-05-10 PROCEDURE — 36592 COLLECT BLOOD FROM PICC: CPT | Performed by: INTERNAL MEDICINE

## 2017-05-10 PROCEDURE — T1013 SIGN LANG/ORAL INTERPRETER: HCPCS | Mod: U3

## 2017-05-10 PROCEDURE — 25000125 ZZHC RX 250

## 2017-05-10 PROCEDURE — 27210437 ZZH NUTRITION PRODUCT SEMIELEM INTERMED LITER

## 2017-05-10 PROCEDURE — 40000193 ZZH STATISTIC PT WARD VISIT: Performed by: PHYSICAL THERAPIST

## 2017-05-10 PROCEDURE — 85610 PROTHROMBIN TIME: CPT | Performed by: INTERNAL MEDICINE

## 2017-05-10 PROCEDURE — 80048 BASIC METABOLIC PNL TOTAL CA: CPT | Performed by: INTERNAL MEDICINE

## 2017-05-10 PROCEDURE — 00000146 ZZHCL STATISTIC GLUCOSE BY METER IP

## 2017-05-10 PROCEDURE — 97116 GAIT TRAINING THERAPY: CPT | Mod: GP | Performed by: PHYSICAL THERAPIST

## 2017-05-10 PROCEDURE — 85730 THROMBOPLASTIN TIME PARTIAL: CPT | Performed by: INTERNAL MEDICINE

## 2017-05-10 PROCEDURE — 21400006 ZZH R&B CCU INTERMEDIATE UMMC

## 2017-05-10 PROCEDURE — 92526 ORAL FUNCTION THERAPY: CPT | Mod: GN

## 2017-05-10 PROCEDURE — 25000128 H RX IP 250 OP 636: Performed by: STUDENT IN AN ORGANIZED HEALTH CARE EDUCATION/TRAINING PROGRAM

## 2017-05-10 PROCEDURE — 25000131 ZZH RX MED GY IP 250 OP 636 PS 637

## 2017-05-10 PROCEDURE — 36415 COLL VENOUS BLD VENIPUNCTURE: CPT | Performed by: INTERNAL MEDICINE

## 2017-05-10 RX ORDER — BUMETANIDE 2 MG/1
2 TABLET ORAL
Status: DISCONTINUED | OUTPATIENT
Start: 2017-05-10 | End: 2017-05-11

## 2017-05-10 RX ORDER — WARFARIN SODIUM 4 MG/1
4 TABLET ORAL
Status: COMPLETED | OUTPATIENT
Start: 2017-05-10 | End: 2017-05-10

## 2017-05-10 RX ADMIN — WARFARIN SODIUM 4 MG: 4 TABLET ORAL at 18:03

## 2017-05-10 RX ADMIN — LISINOPRIL 10 MG: 10 TABLET ORAL at 08:39

## 2017-05-10 RX ADMIN — OXYCODONE HYDROCHLORIDE 10 MG: 5 TABLET ORAL at 17:00

## 2017-05-10 RX ADMIN — Medication 12.5 MG: at 19:39

## 2017-05-10 RX ADMIN — BUMETANIDE 2 MG: 2 TABLET ORAL at 15:53

## 2017-05-10 RX ADMIN — HYDROMORPHONE HYDROCHLORIDE 0.3 MG: 1 INJECTION, SOLUTION INTRAMUSCULAR; INTRAVENOUS; SUBCUTANEOUS at 11:57

## 2017-05-10 RX ADMIN — PANTOPRAZOLE SODIUM 40 MG: 40 TABLET, DELAYED RELEASE ORAL at 19:38

## 2017-05-10 RX ADMIN — SODIUM CHLORIDE, PRESERVATIVE FREE 5 ML: 5 INJECTION INTRAVENOUS at 17:44

## 2017-05-10 RX ADMIN — DIGOXIN 125 MCG: 0.12 TABLET ORAL at 08:40

## 2017-05-10 RX ADMIN — ONDANSETRON 4 MG: 2 INJECTION INTRAMUSCULAR; INTRAVENOUS at 15:55

## 2017-05-10 RX ADMIN — INSULIN ASPART 2 UNITS: 100 INJECTION, SOLUTION INTRAVENOUS; SUBCUTANEOUS at 08:37

## 2017-05-10 RX ADMIN — PANTOPRAZOLE SODIUM 40 MG: 40 TABLET, DELAYED RELEASE ORAL at 08:39

## 2017-05-10 RX ADMIN — CLOPIDOGREL 75 MG: 75 TABLET, FILM COATED ORAL at 08:39

## 2017-05-10 RX ADMIN — Medication 2 G: at 08:39

## 2017-05-10 RX ADMIN — ATORVASTATIN CALCIUM 40 MG: 40 TABLET, FILM COATED ORAL at 19:38

## 2017-05-10 RX ADMIN — MULTIPLE VITAMINS W/ MINERALS TAB 1 TABLET: TAB at 08:38

## 2017-05-10 RX ADMIN — INSULIN GLARGINE 25 UNITS: 100 INJECTION, SOLUTION SUBCUTANEOUS at 08:37

## 2017-05-10 RX ADMIN — Medication: at 11:25

## 2017-05-10 RX ADMIN — POTASSIUM CHLORIDE 20 MEQ: 750 TABLET, EXTENDED RELEASE ORAL at 08:38

## 2017-05-10 RX ADMIN — BUMETANIDE 2 MG: 2 TABLET ORAL at 08:39

## 2017-05-10 RX ADMIN — LISINOPRIL 10 MG: 10 TABLET ORAL at 19:38

## 2017-05-10 RX ADMIN — SODIUM CHLORIDE, PRESERVATIVE FREE 5 ML: 5 INJECTION INTRAVENOUS at 06:25

## 2017-05-10 ASSESSMENT — PAIN DESCRIPTION - DESCRIPTORS: DESCRIPTORS: SORE

## 2017-05-10 NOTE — PLAN OF CARE
Problem: Goal Outcome Summary  Goal: Goal Outcome Summary  1) pt will be hemodynamically stable  2) pt will tolerate weaning of IABP  3) pain will be adequately controlled        SLP 6C: Pt seen for dysphagia tx while upright in chair. Of note, pt advanced from dysphagia diet level 2 to regular textures by medical team on 5/9. No evidence in chart of pt having difficulty with advanced diet. Pt demonstrated x1 instance of overt coughing during session with consumption of soft solids (large bite). Pt appropriate to cautiously continue regular diet and thin liquids; nsg staff to cut food into bite sizes. Pt should be upright, take small bites/sips and alternate consistencies. SLP to follow.

## 2017-05-10 NOTE — PROGRESS NOTES
"[facility]  Cardiology II Service / Advanced Heart Failure  4/30/2017    Interval History:   No acute events overnight, notes reviewed.  Sleeping better.  Tolerating diet better and still getting tube feeds.  Brighter in mood and happy.  Denies chest pain or SOB.  Progressing well with rehab.      Examination:  BP 98/68 (BP Location: Right arm)  Pulse 105  Temp 98.3  F (36.8  C)  Resp 18  Ht 1.575 m (5' 2.01\")  Wt 55.3 kg (122 lb)  SpO2 100%  BMI 22.31 kg/m2     GEN: NAD, lying in bed  NECK: Full ROM, no JVP appreciated  RESP: Chest clear, no wheeze or crackle; subclavian bandage c/d/i  CV: RRR, no m/c/r  ABD: Soft, nontender to palpation, no HSM, +BS  EXT: WWP, no edema   SKIN: Normal skin turgor, no rash     Intake/Output Summary (Last 24 hours) at 05/10/17 1404  Last data filed at 05/10/17 1200   Gross per 24 hour   Intake             1215 ml   Output             5550 ml   Net            -4335 ml     Cr. 0.84    Imaging Studies:    CTA head and neck  Impression:   1. Head CTA demonstrates at least moderate stenosis of both cavernous  internal carotid arteries. Diffuse mild to moderate narrowing of the  basilar artery. Additional areas of mild bilateral M2 segment  narrowing, presumably also related to atherosclerosis.  2. No aneurysm of the major intracranial arteries.   3. Neck CTA demonstrates no carotid artery stenosis. Short segment  mild narrowing of the proximal right V2 segment.  4. No change in probable bilateral cerebral white matter infarcts. No  acute intracranial hemorrhage    Echo 3/27  The visual ejection fraction is estimated at 30-35%.  There is extensive inferior, inferoseptal, and inferolateral wall akinesis.  Basal/mid lateral wall hypokinesis  There is moderate to severe septal hypokinesis.  Septal wall motion abnormality may reflect pacemaker activation.  There is a catheter/pacemaker lead seen in the right ventricle.  The right ventricle is " mildly dilated.  Severely decreased right ventricular systolic function  There is no pericardial effusion.  The rhythm was paced.  Compared to the prior study, the LVEF appears somewhat decreased. Septal wall  motion abnormality larger- may reflect, in part, that patient in sinus tach on  prior study, and ventricular paced now. No hemodynamically significant  valvular abnormalities on 2D or color flow imaging     CT chest/abdomen 3/27   IMPRESSION:   1. Small mediastinal hemorrhage, small bilateral pleural effusions  which may be hemorrhagic. Small intraperitoneal hemorrhage. Minimal  pericardial hemorrhage.  2. Minimal pneumoperitoneum in the left paracolic gutter, of unknown  significance. No pneumatosis as clinically questioned.  3. IABP slightly low in position.  4. Bilateral pulmonary dependent consolidations represent compression  atelectasis, however differentials include aspiration.     Cath 3/26 [facility]  SUMMARY:   --Inferior STEMI w/ late presentation. Culprit dictated by complete  heart block, and cardiogenic shock. Emergent echo in the Cath Lab also  shows significant RV dysfunction.  --Three vessel coronary artery disease with diffuse coronary disease  out to the distal vessels  --Successful POBA of the prox and mid-RCA. Stent was not placed, in  anticipation he may need to be considered for bypass surgery in the  near future  --Insertion of a temporary pacemaker for bradycardia (AVB) and  hypotension  --Insertion of a IABP  --Upper GI bleed consistent with Kaylin-Bonilla     [facility] 3/27     CT head 3/28: normal      Echo 3/29  Interpretation Summary  Limited study. Ischemic CM. Moderately (EF 30-35%) reduced left ventricular  function is present. Traced at 32%.  Posterior wall akinesis is present. Lateral wall akinesis is present. Inferior  wall akinesis is present.  Right ventricular function, chamber size, wall motion, and thickness are  normal.  Dilation of the inferior vena cava  is present with abnormal respiratory  variation in diameter.     Compared to prior study, RV fxn is improved.     Echo 3/31  Left Ventricle  The Ejection Fraction is estimated at 50-55%. Inferior,posterior,lateral,basal  septal wall akinesis as reported before. No change.     CORONARY ANGIOGRAM 4/1:   1. Both coronary arteries arise from their respective cusps.  2. Right dominant.  3. LM has mild luminal irregularities.   4. LAD supplies the apex along with the RCA (type 2 LAD) and gives rise to septal perforators and a large and branching D1. There are widely patent stents extending from the proximal to mid LAD. The dLAD has mild to moderate disease to 30% stenosis. The D1 is jailed by the LAD stents, but has EBER 3 flow with disease to 30% stenosis.   5. The small ramus is no longer present.  6. LCX is occluded at the ostium.   7. RCA gives rise to PL branches and supplies PDA. There are widely patent stents extending from the proximal to mid RCA. The remainder of the mRCA has disease to 50% stenosis and the dRCA has disease to 10% stenosis. The RPDA has a widely patent stent and the remainder of the artery has mild disease to 10% stenosis. The RPLA has small vessels with diffuse disease including a distal branch occlusion. The RPLA supplies some small collateral branches to the dLCx.      COMPLICATIONS:  1. None      SUMMARY:   1. Cardiogenic shock following an inferior STEMI.  2. The LCx re-occlusion is not surprising as the recently revascularized LCx  had poor outflow and the revascularized portion did not supply a large territory (limited to no flow was present distal to the stented segment immediately following stenting). Repeat revascularization of the LCx would result in the same outcome of repeat closure and also risk embolizing thrombus from the LCx into the LAD so no treatment of the occluded LCx was performed.   3. Three vessel coronary artery disease with patent LAD and RCA stents and an occluded  LCx.     Echo 4/3  Left ventricular size is normal.  The Ejection Fraction is estimated at 35-40%.  Inferior wall akinesis is present.  Lateral wall akinesis is present.  Posterior wall akinesis is present.  Right ventricular function, chamber size, wall motion, and thickness are  normal.  Moderate mitral insufficiency is present.  Moderate tricuspid insufficiency is present.  Right ventricular systolic pressure is 47mmHg above the right atrial pressure.  The inferior vena cava is normal.     Echo 4/5  Moderately (EF 35-40%) reduced left ventricular function is present. Traced at  40%.  Moderate mitral insufficiency is present.  The cause of the mitral insufficiency appears to be restricted posterior  leaflet from posterior MI. No mitral leaflet pathology seen.  Dilation of the inferior vena cava is present with abnormal respiratory  variation in diameter.     CT abd without contrast 4/4  IMPRESSION:   1. No evidence of ischemic bowel, obstruction, or intra-abdominal  infection.  2. Bibasilar patchy pulmonary opacities likely represent combination  of aspiration and atelectasis.  3. Small amount of simple free fluid within the lower abdomen.  4. Small left retroperitoneal hematoma along the iliac vasculature  likely postprocedural.   5. Unchanged size of bilateral pleural effusions, which now consist of  simple fluid.    Assessment:  51 year old man presented with cardiogenic shock from inferior STEMI s/p RCA aspiration thrombectomy and POBA on 3/26 at [facility] s/p IABP and initiation of Dopamine, also during procedure, CHB s/p temp pacer, emesis/hematmesis and altered mental status s/p intubation transferred here for consideration of mechanical support on 3/27. Had PCI to RCA, LAD and LCx (on ASA and plavix) started on epinephrine in addition to dopamine, post procedure developed distributive shock picture from infection (aspiration pneumonia? Sputum cx growing staph aureus, on vanco and zosyn) vs post-MI SIRS  response. Developed cardiogenic shock now s/p IABP, removed 5/6/17.      Plan Today:  - Decrease bumex to 2mg BID  -  D/c dawkins  - Ct. PT/OT, d/c to acute rehab  - Start aldactone 12.5mg qD  - Advance TF    # Cardiogenic shock   # CAD  # Ischemic cardiomyopathy  2/2 underlying 3v CAD-has had high SVR and low CI, complicated by ischemic MR.  ICM to inferior wall STEMI s/p 7 FEMI to LAD, LCx, RCA on 3/27. LCx was  and is re-occluded on angio 4/1.  Cortisol normal.  MitraClip placed on 5/1, IABP weaned post procedure.  Discontinued IABP on 5/5.  d/c Hydralazine 50mg QID (5/8).   - Lisinopril 10 mg BID  - Digoxin 125mcg  - Bumex 2mg TID  - Subclavian line will need to me removed in the OR  - Needs plavix 1 year  - Start spironolactone 12.5mg qD  - Add back BB as BP allows    # h/o CVA  CVA presenting with left hand weakness and pain.  CTA head/neck showed no hemorrhage but new areas of low attenuation in b/l hemispheres.  - Neuro consulted. Continuing Plavix and statin  - MAP goal > 70    # Mitral regurg s/p MitraClip  - Needs lifelong antiplatelet, continue Plavix for 1 year for FEMI then change to ASA    # Hypoxic respiratory failure  Intubated in the setting of worsening hypoxia on 4/25, extubated 4/28.  Thought to be aspiration PNA vs PE vs volume overload.  Treated with broad spectrum abx for two weeks starting 4/19.    - Continue to monitor volume status  - Diuresing to CVP < 12, currently on 2L NC    # Urinary tract infection  UA 4/15 with cloudy urine, 50 WBCs, many bacteria, no nitrates. UCx grew E coli - treated with CFTX (sensitive).  Fever on 4/20 and single spike 100.2 overnight with blood cultures in process (no growth thus far), PICC and SGC removed, started on Vanc and pip/tazo, treatment completed on 5/1.   - CTM WBC and fever curve    # T2DM:   HA1C 7.5 on admission.  - Insulin gtt per nurising protocol with hypoglycemia precautions.   - SQ Lantus       # Lower GIB  Had several maroon BMs since 4/11,  Hb overall dropped by a 1.5 grams or so, heparin inf was held and GI consulted.  Colonoscopy 4/17 showed a rectal ulcer but no intervenable lesions.   - Trend hgb  - Protonix 40 PO BID    # H/o DVT  Non-occlusive clot in left IJ and left common femoral vein. S/p Heparin gtt low.   intensity  - Warfarin, pharmacy to dose      # Hypernatremia  - Resolved. Stopped his D5W, only getting FWF's.    # Insomnia  - Melatonin qHS  - Seroquel 12.5mg qHS    FEN: feeding tube, and dysphagia level II  Code: FULL  PPx: PPI 40mg BID, senna PRN, warfarin  Dispo: pending stability    LINES:  - LUE PICC   4/20    Plan of care discussed with Dr. Jamison Camilo MD, MPH  PGY-3, Internal Medicine   [phone]

## 2017-05-10 NOTE — PROGRESS NOTES
"Social Work Services Progress Note    Hospital Day: 45  Date of Initial Social Work Evaluation:  3/28/17  Collaborated with:  Pt, daughter Noah, Rehab admissions liaison    Data:  Pt is a 51 year old male being assessed for ARU placement.  Pt demonstrated no confusion or inability to answer questions.  Pt was appropriate with his answers.    Intervention:  SW and rehab liaison Juan met with pt and daughter to discuss rehab options for care.  Pt and daughter appear open to a referral to ARU placement.    Assessment:    Significant relationship at present time:  Pt's daughter Noah is visiting.  Noah reports that she works remotely and can be onsite at the hospital.  Noah states she normally lives in [place], [place].    Family of origin is available for support:  Yes, pt has a second wife and adult step children who live in the home with him.  Pt and daughter Noah confirm the family will be available at home to help care for the pt.  Pt's goal is to \"get back to work\" as soon as possible.  Pt states \"I am not ready to retire, I don't want that\".   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  Pt agreeable to short term rehab options to assist with returning to work.  Other support available:  Yes, daughter can work remotely and provide support as needed.  Pt states he gets good support from Noah's  as well.  Noah's  is currently in the [employer].  Gaps in support system:  None reported by pt or daughter.  Family agrees pt has good supports in place after rehab.    Patient is caregiver to:  None    Plan:    Anticipated Disposition:  Facility:  ARU recommended.    Barriers to d/c plan:  Medical stability, bed availability    Follow Up:  SW will continue to follow up with pt and family for discharge planning.  ARU referral placed today.    MARLA Nowak, URBANW  6C Unit   Phone: [phone]  Pager: [phone]  Unit: [phone]        "

## 2017-05-10 NOTE — PLAN OF CARE
Problem: Goal Outcome Summary  Goal: Goal Outcome Summary  1) pt will be hemodynamically stable  2) pt will tolerate weaning of IABP  3) pain will be adequately controlled     OT 6C: Pt making significant gains in therapy during today's session. Pt seen for functional transfers and therapeutic exercise to increase standing tolerance, dynamic balance, and UE strength for ADLs. Pt CGA for sit <> stands. Pt tolerated ~15 minutes of standing activity requiring CGA-min A with FWW for completion of balance activities. Pt progressing in UE strength, balance, and activity tolerance however continues to be well below baseline in ADLs and functional mobility.      REC ARU as pt is very motivated to return to PLOF, demonstrates skilled needs for OT/PT/SLP, and would likely tolerate increased frequency of therapies.

## 2017-05-10 NOTE — PROGRESS NOTES
CVTS    Incision appears c/d/i, no signs of infection, no hematoma.   Other cares per cards  CVTS will sign off. Please do not hesitate to call if there are any questions or concerns.     Terrance Stafford PA-C  Cardiothoracic Surgery  May 10, 2017 1:01 PM   p: [phone]

## 2017-05-10 NOTE — PROGRESS NOTES
"[facility] [place]  Cardiology II Service / Advanced Heart Failure      Interval History:     - no acute events overnight  - sleepy but he specifically denies any chest pain, presyncope, syncope, dizziness or lightheadedness  - no complaints or concerns   - minimal pain at Right subclavian artery graft site (POD#1 s/p removal)      ROS otherwise negative.     Examination:  /79  Pulse 109  Temp 98.9  F (37.2  C) (Oral)  Resp 16  Ht 1.575 m (5' 2.01\")  Wt 60.3 kg (132 lb 15 oz)  SpO2 99%  BMI 24.31 kg/m2     GEN: NAD, lying in bed  NECK: Full ROM, no JVP appreciated  RESP: Chest clear, no wheeze or crackle  CV: RRR, no m/c/r, subclavian graft site dressed C/D/I  ABD: Soft, nontender to palpation, no HSM, +BS  EXT: WWP, no edema   SKIN: Normal skin turgor, no rash     Data:  Intake/Output Summary (Last 24 hours) at 05/08/17 1115  Last data filed at 05/08/17 1000   Gross per 24 hour   Intake           1570.5 ml   Output             1465 ml   Net            105.5 ml       Cr. 0.77    CVP 17    Imaging Studies:    CTA heaf and neck  Impression:   1. Head CTA demonstrates at least moderate stenosis of both cavernous  internal carotid arteries. Diffuse mild to moderate narrowing of the  basilar artery. Additional areas of mild bilateral M2 segment  narrowing, presumably also related to atherosclerosis.  2. No aneurysm of the major intracranial arteries.   3. Neck CTA demonstrates no carotid artery stenosis. Short segment  mild narrowing of the proximal right V2 segment.  4. No change in probable bilateral cerebral white matter infarcts. No  acute intracranial hemorrhage    Echo 3/27  The visual ejection fraction is estimated at 30-35%.  There is extensive inferior, inferoseptal, and inferolateral wall akinesis.  Basal/mid lateral wall hypokinesis  There is moderate to severe septal hypokinesis.  Septal wall motion abnormality may reflect pacemaker activation.  There is a " catheter/pacemaker lead seen in the right ventricle.  The right ventricle is mildly dilated.  Severely decreased right ventricular systolic function  There is no pericardial effusion.  The rhythm was paced.  Compared to the prior study, the LVEF appears somewhat decreased. Septal wall  motion abnormality larger- may reflect, in part, that patient in sinus tach on  prior study, and ventricular paced now. No hemodynamically significant  valvular abnormalities on 2D or color flow imaging     CT chest/abdomen 3/27   IMPRESSION:   1. Small mediastinal hemorrhage, small bilateral pleural effusions  which may be hemorrhagic. Small intraperitoneal hemorrhage. Minimal  pericardial hemorrhage.  2. Minimal pneumoperitoneum in the left paracolic gutter, of unknown  significance. No pneumatosis as clinically questioned.  3. IABP slightly low in position.  4. Bilateral pulmonary dependent consolidations represent compression  atelectasis, however differentials include aspiration.     Cath 3/26 [facility]  SUMMARY:   --Inferior STEMI w/ late presentation. Culprit dictated by complete  heart block, and cardiogenic shock. Emergent echo in the Cath Lab also  shows significant RV dysfunction.  --Three vessel coronary artery disease with diffuse coronary disease  out to the distal vessels  --Successful POBA of the prox and mid-RCA. Stent was not placed, in  anticipation he may need to be considered for bypass surgery in the  near future  --Insertion of a temporary pacemaker for bradycardia (AVB) and  hypotension  --Insertion of a IABP  --Upper GI bleed consistent with Kaylin-Bonilla     [facility] 3/27     CT head 3/28: normal      Echo 3/29  Interpretation Summary  Limited study. Ischemic CM. Moderately (EF 30-35%) reduced left ventricular  function is present. Traced at 32%.  Posterior wall akinesis is present. Lateral wall akinesis is present. Inferior  wall akinesis is present.  Right ventricular function, chamber size,  wall motion, and thickness are  normal.  Dilation of the inferior vena cava is present with abnormal respiratory  variation in diameter.     Compared to prior study, RV fxn is improved.     Echo 3/31  Left Ventricle  The Ejection Fraction is estimated at 50-55%. Inferior,posterior,lateral,basal  septal wall akinesis as reported before. No change.     CORONARY ANGIOGRAM 4/1:   1. Both coronary arteries arise from their respective cusps.  2. Right dominant.  3. LM has mild luminal irregularities.   4. LAD supplies the apex along with the RCA (type 2 LAD) and gives rise to septal perforators and a large and branching D1. There are widely patent stents extending from the proximal to mid LAD. The dLAD has mild to moderate disease to 30% stenosis. The D1 is jailed by the LAD stents, but has EBER 3 flow with disease to 30% stenosis.   5. The small ramus is no longer present.  6. LCX is occluded at the ostium.   7. RCA gives rise to PL branches and supplies PDA. There are widely patent stents extending from the proximal to mid RCA. The remainder of the mRCA has disease to 50% stenosis and the dRCA has disease to 10% stenosis. The RPDA has a widely patent stent and the remainder of the artery has mild disease to 10% stenosis. The RPLA has small vessels with diffuse disease including a distal branch occlusion. The RPLA supplies some small collateral branches to the dLCx.      COMPLICATIONS:  1. None      SUMMARY:   1. Cardiogenic shock following an inferior STEMI.  2. The LCx re-occlusion is not surprising as the recently revascularized LCx  had poor outflow and the revascularized portion did not supply a large territory (limited to no flow was present distal to the stented segment immediately following stenting). Repeat revascularization of the LCx would result in the same outcome of repeat closure and also risk embolizing thrombus from the LCx into the LAD so no treatment of the occluded LCx was performed.   3. Three  vessel coronary artery disease with patent LAD and RCA stents and an occluded LCx.     Echo 4/3  Left ventricular size is normal.  The Ejection Fraction is estimated at 35-40%.  Inferior wall akinesis is present.  Lateral wall akinesis is present.  Posterior wall akinesis is present.  Right ventricular function, chamber size, wall motion, and thickness are  normal.  Moderate mitral insufficiency is present.  Moderate tricuspid insufficiency is present.  Right ventricular systolic pressure is 47mmHg above the right atrial pressure.  The inferior vena cava is normal.     Echo 4/5  Moderately (EF 35-40%) reduced left ventricular function is present. Traced at  40%.  Moderate mitral insufficiency is present.  The cause of the mitral insufficiency appears to be restricted posterior  leaflet from posterior MI. No mitral leaflet pathology seen.  Dilation of the inferior vena cava is present with abnormal respiratory  variation in diameter.     CT abd without contrast 4/4  IMPRESSION:   1. No evidence of ischemic bowel, obstruction, or intra-abdominal  infection.  2. Bibasilar patchy pulmonary opacities likely represent combination  of aspiration and atelectasis.  3. Small amount of simple free fluid within the lower abdomen.  4. Small left retroperitoneal hematoma along the iliac vasculature  likely postprocedural.   5. Unchanged size of bilateral pleural effusions, which now consist of  simple fluid.    Assessment:  51 year old man presented with cardiogenic shock from inferior STEMI s/p RCA aspiration thrombectomy and POBA on 3/26 at [facility] s/p IABP and initiation of Dopamine, also during procedure, [facility] s/p temp pacer, emesis/hematmesis and altered mental status s/p intubation transferred here for consideration of mechanical support on 3/27. Had PCI to RCA, LAD and LCx (on ASA and plavix) started on epinephrine in addition to dopamine, post procedure developed distributive shock picture from infection (aspiration  pneumonia? Sputum cx growing staph aureus, on vanco and zosyn) vs post-MI SIRS response. Developed cardiogenic shock now s/p IABP, removed 5/6/17.      Plan Today:  - continue lisinopril 10mg BID  - continue bumex 2mg TID  - continue seroquel 12.5mg for sleep  - d/c ASA  - Continue warfarin/plavix    # Cardiogenic shock   # CAD  # Ischemic cardiomyopathy  2/2 underlying 3v CAD-has had high SVR and low CI, complicated by ischemic MR. ICM to inferior wall STEMI s/p 7 FEMI to LAD, LCx, RCA on 3/27. LCx was  and is re-occluded on angio 4/1.  Cortisol normal. MitraClip placed on 5/1, IABP weaned post procedure.  Discontinued IABP on 5/5. d/c Hydralazine 50mg QID (5/8).   - Lisinopril 10 mg BID  - Digoxin 125mcg  - Bumex 2mg TID  - Subclavian line removed 5/8/2017  - Needs plavix 1 year  - Add back spironolactone and BB as BP allows    # h/o CVA  CVA presenting with left hand weakness and pain. CTA head/neck showed no hemorrhage but new areas of low attenuation in b/l hemispheres.  - Neuro consulted. Continuing Plavix and statin  - MAP goal > 70    # Mitral regurg s/p MitraClip  - Needs lifelong antiplatelet, continue Plavix for 1 year for FEMI then change to ASA    # Hypoxic respiratory failure  Intubated in the setting of worsening hypoxia on 4/25, extubated 4/28.  Thought to be aspiration PNA vs PE vs volume overload.  Treated with broad spectrum abx for two weeks starting 4/19.    - Continue to monitor volume status  - Diuresing to CVP < 12, currently on 2L NC    # Urinary tract infection  UA 4/15 with cloudy urine, 50 WBCs, many bacteria, no nitrates. UCx grew E coli - treated with CFTX (sensitive).  Fever on 4/20 and single spike 100.2 overnight with blood cultures in process (no growth thus far), PICC and SGC removed, started on Vanc and pip/tazo, treatment completed on 5/1.   - CTM WBC and fever curve    # T2DM:   HA1C 7.5 on admission.  - Insulin gtt per nurising protocol with hypoglycemia precautions.   - SQ  [place]       # Lower GIB  Had several maroon BMs since 4/11, Hb overall dropped by a 1.5 grams or so, heparin inf was held and GI consulted.  Colonoscopy 4/17 showed a rectal ulcer but no intervenable lesions.   - Trend hgb  - Protonix 40 PO BID    # H/o DVT  Non-occlusive clot in left IJ and left common femoral vein. S/p Heparin gtt low.   intensity  - Warfarin, pharmacy to dose      # Hypernatremia  - Resolved. Stopped his D5W, only getting FWF's.    # Insomnia  - Melatonin qHS  - Seroquel 12.5mg qHS    FEN: feeding tube, and dysphagia level II  Code: FULL  PPx: PPI 40mg BID, senna PRN, warfarin  Dispo: pending stability    LINES:  - LUE PICC   4/20  - Chavis catheter  5/3  - Lt IJ                            4/29    Pt seen and examined with the Attending physician.    Keenan Whitfield MD  PGY-4 Cardiology  Pager: [phone]  May 9, 2017

## 2017-05-10 NOTE — PROGRESS NOTES
CLINICAL NUTRITION SERVICES     Nutrition Prescription    RECOMMENDATIONS FOR MDs/PROVIDERS TO ORDER:  Liberalize diet order to help encourage oral intake.     Recommendations already ordered by Registered Dietitian (RD):  Kcal counts   Prn snack/supplement order    Future/Additional Recommendations:  For all recommendations, see nutrition note 5/9 for details. Ensure good glycemic control for wound healing. Hgb A1c of 7.5 on 3/27/17 and h/o DM.      INTERVENTIONS:  Implementation:  1. Ordered kcal counts 5/10-5/12.  2. Medical food supplement therapy: Placed prn supplement order. Pt may request snacks or oral supplements prn. Placed one-time orders for pt to try apple and berry Ensure Clear at mealtimes.     Follow up/Monitoring:  Will continue to follow pt.    Miladis Sinclair, MS, RD, LD, [facility]   6C Pgr:  [phone]

## 2017-05-10 NOTE — PLAN OF CARE
"Met with patient and his adult daughter with the  at the bedside. Both described a strong family support system and a high premorbid level of function. Patient describes working for [employer] doing [employer], and states that his wish is to return to work as he is \"not ready to retire.\" Patient appeared to be cognitively intact and was able to participate fully in our conversation. He expressed a desire to rehabilitate quickly and dc home and return to his PLOF.    His insurance plan requires prior authorization for Inpatient Acute Rehabilitation admission.    Thank you for the referral, we will continue to follow this patient for post acute placement.     Determination of admission is based upon the patient's need for an intensive, interdisciplinary approach to rehabilitation, their ability to progress, their ability to tolerate intensive therapies, their need for daily physician supervision, their need for twenty four hour nursing assistance, and their ability and willingness to participate in such a program.    Tanvir Nelson RN  Rehab Liaison/  [facility]  5/10/2017    12:06 PM    "

## 2017-05-10 NOTE — PLAN OF CARE
Problem: Goal Outcome Summary  Goal: Goal Outcome Summary  1) pt will be hemodynamically stable  2) pt will tolerate weaning of IABP  3) pain will be adequately controlled     Outcome: No Change  D/A/I:  Patient A&O x4, denied palpitations, difficulty breathing, SOB, dizziness, and nausea.  Lung sounds diminished in left lung base, fine crackles auscultated in right lung base.  Right chest incision clean, dry, and intact.  Right chest wound pink and moist, serosanguinous drainage on packing.  Right groin incision slightly moist, scant serous drainage on dressing.  Sacral wound pink, white, and moist with serous drainage on dressing.  Had +1 generalized edema, was given scheduled bumetanide.  Patient had good urine output via dawkins catheter, see I&O flowsheet.  Dawkins removed during shift, has had previous issues with retention.  Was encouraged to actively attempt to urinate post-removal.  Was straight cathed at 1415 due to low abdominal discomfort and bladder scan of 999, had output of 775 ml clear yellow urine.  Reported no bowel movement since 5/7, bowel sounds hyperactive, reported passing gas.  In sinus tachycardia with HR 100s, SBP 90s, SaO2 % on 2L O2 via nasal cannula.  Tube feeds stopped at noon per change in administration orders.  Potassium and magnesium replaced per protocol.  Reported wound and incisional pain during dressing changes, was given opioid analgesics, see MAR.  Patient reported a decrease in pain, fell asleep during dressing changes, but was easy to rouse.  Ambulated in hallway with 2-person assist and use of walker.  Daughter stayed with patient during shift.     P:  Continue to monitor pain, VS, heart rhythm, wound and incision integrity, fluid status, bowel status, cardiac and respiratory status.  Notify care team of changes in patient condition or other concerns.  Regularly reposition to promote skin integrity.  Restart tube feeds at 1800 at rate of 60 ml/hr.

## 2017-05-10 NOTE — PLAN OF CARE
Problem: Goal Outcome Summary  Goal: Goal Outcome Summary  1) pt will be hemodynamically stable  2) pt will tolerate weaning of IABP  3) pain will be adequately controlled     Outcome: No Change  Pt transferred from cvicu on previous shift. Pt able to turn side to side, does need reminders to stay off coccyx and back due to pressure area. Pt able to take po meds with nectar thick liquids.

## 2017-05-10 NOTE — PROGRESS NOTES
Care Coordinator Progress Note     Admission Date/Time:  3/27/2017  Attending MD:  Jamison Swift MD     Data  Chart reviewed, discussed with interdisciplinary team.   Patient was admitted for:    Cardiogenic shock (H)  Coronary artery disease involving native coronary artery of native heart without angina pectoris  Mitral valve insufficiency, unspecified etiology  Mitral regurgitation.  Barriers to discharge: Post op recovery  Assessment  OT currently recommending ARU at discharge, SW following for placement options.  Pt currently receiving cardiac telemetry monitoring and cycled TF via ND tube.  Pt is a new warfarin start and will need outpt monitoring set up upon discharge from ARU.     Plan  Anticipated Discharge Date: TBD  Anticipated Discharge Plan: Discharge to ARU  CC will continue to monitor patient's medical condition and progress towards discharge.  Destini Rodriguez RN BSN  6C Unit Care Coordinator  Phone number: [phone]  Pager: [phone]

## 2017-05-10 NOTE — PROGRESS NOTES
CLINICAL NUTRITION SERVICES     Nutrition Prescription    Recommendations already ordered by Registered Dietitian (RD):  Decreased daily TF volume and cycled TFs    Future/Additional Recommendations:  For all recommendations, see nutrition note 5/9 and also earlier today (5/10).      Received request to cycle TFs    Kcal counts pending. For his first meal today (5/10), he consumed 100% of his omelet, about one-third of his salad with chicken, and 100% of his lemonade. He was going to try the apple Ensure Clear.     INTERVENTIONS:  Implementation:  Collaboration with other providers, Enteral Nutrition: Discussed pt with RN and with team. Decision was made to decrease TFs to provide ~75% of his needs. New TF regimen is Peptamen 1.5 at 60 mL/hr x 12 hrs (18:00-6:00). TF regimen provides 720 mL TF, 1080 kcals (19 kcals/kg), 49 g protein (0.9 g protein/kg), 554 mL H2O, 135 g CHO, and no fiber daily.     Follow up/Monitoring:  Will continue to follow pt.    Miladis Sinclair, MS, RD, LD, [facility]   6C Pgr:  [phone]

## 2017-05-10 NOTE — PLAN OF CARE
"Problem: Goal Outcome Summary  Goal: Goal Outcome Summary  1) pt will be hemodynamically stable  2) pt will tolerate weaning of IABP  3) pain will be adequately controlled     PT 6C: Pt very motivated and progressing well in therapy sessions. Completes supine>sitting EOB with ModA of 1. Sit<>stands with Min-ModA of 2 and fww. Able to initiate short distance ambulation today. Pt ambulates up to 30 ft bouts with ModA of 1 and fww and close w/c follow. L LE weak and buckling at times, pt reports it feels \"heavy\". Daughter reporting this was present prior to admission as well. Increased instability with fatigue and needs up to ModA with fww to maintain balance.  bpm and SpO2 95% on 2L of O2 via NC.     Recommend discharge to ARU.       "

## 2017-05-11 ENCOUNTER — APPOINTMENT (OUTPATIENT)
Dept: PHYSICAL THERAPY | Facility: CLINIC | Age: 52
DRG: 228 | End: 2017-05-11
Attending: INTERNAL MEDICINE
Payer: COMMERCIAL

## 2017-05-11 ENCOUNTER — APPOINTMENT (OUTPATIENT)
Dept: SPEECH THERAPY | Facility: CLINIC | Age: 52
DRG: 228 | End: 2017-05-11
Attending: INTERNAL MEDICINE
Payer: COMMERCIAL

## 2017-05-11 ENCOUNTER — APPOINTMENT (OUTPATIENT)
Dept: OCCUPATIONAL THERAPY | Facility: CLINIC | Age: 52
DRG: 228 | End: 2017-05-11
Attending: INTERNAL MEDICINE
Payer: COMMERCIAL

## 2017-05-11 LAB
ANION GAP SERPL CALCULATED.3IONS-SCNC: 6 MMOL/L (ref 3–14)
ANION GAP SERPL CALCULATED.3IONS-SCNC: 7 MMOL/L (ref 3–14)
APTT PPP: 42 SEC (ref 22–37)
BUN SERPL-MCNC: 26 MG/DL (ref 7–30)
BUN SERPL-MCNC: 28 MG/DL (ref 7–30)
CA-I BLD-MCNC: 4.4 MG/DL (ref 4.4–5.2)
CALCIUM SERPL-MCNC: 8.1 MG/DL (ref 8.5–10.1)
CALCIUM SERPL-MCNC: 8.4 MG/DL (ref 8.5–10.1)
CHLORIDE SERPL-SCNC: 98 MMOL/L (ref 94–109)
CHLORIDE SERPL-SCNC: 99 MMOL/L (ref 94–109)
CO2 SERPL-SCNC: 30 MMOL/L (ref 20–32)
CO2 SERPL-SCNC: 31 MMOL/L (ref 20–32)
CREAT SERPL-MCNC: 0.81 MG/DL (ref 0.66–1.25)
CREAT SERPL-MCNC: 0.86 MG/DL (ref 0.66–1.25)
ERYTHROCYTE [DISTWIDTH] IN BLOOD BY AUTOMATED COUNT: 17.6 % (ref 10–15)
GFR SERPL CREATININE-BSD FRML MDRD: ABNORMAL ML/MIN/1.7M2
GFR SERPL CREATININE-BSD FRML MDRD: ABNORMAL ML/MIN/1.7M2
GLUCOSE BLDC GLUCOMTR-MCNC: 151 MG/DL (ref 70–99)
GLUCOSE BLDC GLUCOMTR-MCNC: 179 MG/DL (ref 70–99)
GLUCOSE BLDC GLUCOMTR-MCNC: 92 MG/DL (ref 70–99)
GLUCOSE SERPL-MCNC: 128 MG/DL (ref 70–99)
GLUCOSE SERPL-MCNC: 170 MG/DL (ref 70–99)
HCT VFR BLD AUTO: 33.1 % (ref 40–53)
HGB BLD-MCNC: 10.1 G/DL (ref 13.3–17.7)
INR PPP: 2.48 (ref 0.86–1.14)
LACTATE BLD-SCNC: 1.1 MMOL/L (ref 0.7–2.1)
MAGNESIUM SERPL-MCNC: 2 MG/DL (ref 1.6–2.3)
MAGNESIUM SERPL-MCNC: 2.2 MG/DL (ref 1.6–2.3)
MCH RBC QN AUTO: 29.6 PG (ref 26.5–33)
MCHC RBC AUTO-ENTMCNC: 30.5 G/DL (ref 31.5–36.5)
MCV RBC AUTO: 97 FL (ref 78–100)
PHOSPHATE SERPL-MCNC: 3.6 MG/DL (ref 2.5–4.5)
PLATELET # BLD AUTO: 279 10E9/L (ref 150–450)
POTASSIUM SERPL-SCNC: 4 MMOL/L (ref 3.4–5.3)
POTASSIUM SERPL-SCNC: 4.1 MMOL/L (ref 3.4–5.3)
RBC # BLD AUTO: 3.41 10E12/L (ref 4.4–5.9)
SODIUM SERPL-SCNC: 135 MMOL/L (ref 133–144)
SODIUM SERPL-SCNC: 136 MMOL/L (ref 133–144)
WBC # BLD AUTO: 8.7 10E9/L (ref 4–11)

## 2017-05-11 PROCEDURE — 27210437 ZZH NUTRITION PRODUCT SEMIELEM INTERMED LITER

## 2017-05-11 PROCEDURE — 25000132 ZZH RX MED GY IP 250 OP 250 PS 637: Performed by: STUDENT IN AN ORGANIZED HEALTH CARE EDUCATION/TRAINING PROGRAM

## 2017-05-11 PROCEDURE — 25000125 ZZHC RX 250: Performed by: INTERNAL MEDICINE

## 2017-05-11 PROCEDURE — 97116 GAIT TRAINING THERAPY: CPT | Mod: GP | Performed by: PHYSICAL THERAPIST

## 2017-05-11 PROCEDURE — 92526 ORAL FUNCTION THERAPY: CPT | Mod: GN

## 2017-05-11 PROCEDURE — 25000125 ZZHC RX 250

## 2017-05-11 PROCEDURE — 97530 THERAPEUTIC ACTIVITIES: CPT | Mod: GO

## 2017-05-11 PROCEDURE — 36592 COLLECT BLOOD FROM PICC: CPT | Performed by: INTERNAL MEDICINE

## 2017-05-11 PROCEDURE — 25000132 ZZH RX MED GY IP 250 OP 250 PS 637

## 2017-05-11 PROCEDURE — 25000132 ZZH RX MED GY IP 250 OP 250 PS 637: Performed by: INTERNAL MEDICINE

## 2017-05-11 PROCEDURE — 40000133 ZZH STATISTIC OT WARD VISIT

## 2017-05-11 PROCEDURE — 85610 PROTHROMBIN TIME: CPT | Performed by: INTERNAL MEDICINE

## 2017-05-11 PROCEDURE — 40000802 ZZH SITE CHECK

## 2017-05-11 PROCEDURE — 82330 ASSAY OF CALCIUM: CPT | Performed by: INTERNAL MEDICINE

## 2017-05-11 PROCEDURE — 21400006 ZZH R&B CCU INTERMEDIATE UMMC

## 2017-05-11 PROCEDURE — 85730 THROMBOPLASTIN TIME PARTIAL: CPT | Performed by: INTERNAL MEDICINE

## 2017-05-11 PROCEDURE — 25000128 H RX IP 250 OP 636: Performed by: STUDENT IN AN ORGANIZED HEALTH CARE EDUCATION/TRAINING PROGRAM

## 2017-05-11 PROCEDURE — 40000225 ZZH STATISTIC SLP WARD VISIT

## 2017-05-11 PROCEDURE — 83735 ASSAY OF MAGNESIUM: CPT | Performed by: INTERNAL MEDICINE

## 2017-05-11 PROCEDURE — 84100 ASSAY OF PHOSPHORUS: CPT | Performed by: INTERNAL MEDICINE

## 2017-05-11 PROCEDURE — 40000193 ZZH STATISTIC PT WARD VISIT: Performed by: PHYSICAL THERAPIST

## 2017-05-11 PROCEDURE — 97535 SELF CARE MNGMENT TRAINING: CPT | Mod: GO

## 2017-05-11 PROCEDURE — 00000146 ZZHCL STATISTIC GLUCOSE BY METER IP

## 2017-05-11 PROCEDURE — 99233 SBSQ HOSP IP/OBS HIGH 50: CPT | Mod: GC | Performed by: INTERNAL MEDICINE

## 2017-05-11 PROCEDURE — 80048 BASIC METABOLIC PNL TOTAL CA: CPT | Performed by: INTERNAL MEDICINE

## 2017-05-11 PROCEDURE — 25000131 ZZH RX MED GY IP 250 OP 636 PS 637

## 2017-05-11 PROCEDURE — 97530 THERAPEUTIC ACTIVITIES: CPT | Mod: GP | Performed by: PHYSICAL THERAPIST

## 2017-05-11 PROCEDURE — 85027 COMPLETE CBC AUTOMATED: CPT | Performed by: INTERNAL MEDICINE

## 2017-05-11 PROCEDURE — 83605 ASSAY OF LACTIC ACID: CPT | Performed by: INTERNAL MEDICINE

## 2017-05-11 RX ORDER — LISINOPRIL 2.5 MG/1
7.5 TABLET ORAL 2 TIMES DAILY
Qty: 30 TABLET | Refills: 1
Start: 2017-05-11 | End: 2017-05-12

## 2017-05-11 RX ORDER — ATORVASTATIN CALCIUM 40 MG/1
40 TABLET, FILM COATED ORAL DAILY
Qty: 30 TABLET | Refills: 1
Start: 2017-05-11 | End: 2017-05-12

## 2017-05-11 RX ORDER — DIGOXIN 125 MCG
125 TABLET ORAL DAILY
Qty: 30 TABLET | Refills: 1 | Status: SHIPPED | OUTPATIENT
Start: 2017-05-11 | End: 2017-05-12

## 2017-05-11 RX ORDER — QUETIAPINE FUMARATE 25 MG/1
12.5 TABLET, FILM COATED ORAL AT BEDTIME
Qty: 60 TABLET | Refills: 1 | Status: SHIPPED | OUTPATIENT
Start: 2017-05-11 | End: 2017-05-12

## 2017-05-11 RX ORDER — BUMETANIDE 1 MG/1
1 TABLET ORAL
Status: DISCONTINUED | OUTPATIENT
Start: 2017-05-11 | End: 2017-05-11

## 2017-05-11 RX ORDER — LANOLIN ALCOHOL/MO/W.PET/CERES
3 CREAM (GRAM) TOPICAL
Qty: 30 TABLET | Refills: 1 | Status: SHIPPED | OUTPATIENT
Start: 2017-05-11 | End: 2017-05-12

## 2017-05-11 RX ORDER — BUMETANIDE 1 MG/1
1 TABLET ORAL
Status: DISCONTINUED | OUTPATIENT
Start: 2017-05-12 | End: 2017-05-12 | Stop reason: HOSPADM

## 2017-05-11 RX ORDER — SPIRONOLACTONE 25 MG
12.5 TABLET ORAL DAILY
Status: DISCONTINUED | OUTPATIENT
Start: 2017-05-11 | End: 2017-05-12 | Stop reason: HOSPADM

## 2017-05-11 RX ORDER — SPIRONOLACTONE 25 MG/1
12.5 TABLET ORAL DAILY
Qty: 30 TABLET | Refills: 1 | Status: SHIPPED | OUTPATIENT
Start: 2017-05-11 | End: 2017-05-12

## 2017-05-11 RX ORDER — BUMETANIDE 1 MG/1
1 TABLET ORAL
Qty: 30 TABLET | Refills: 1 | Status: SHIPPED | OUTPATIENT
Start: 2017-05-12 | End: 2017-05-12

## 2017-05-11 RX ORDER — PANTOPRAZOLE SODIUM 40 MG/1
40 TABLET, DELAYED RELEASE ORAL 2 TIMES DAILY
Qty: 30 TABLET | Refills: 1 | Status: SHIPPED | OUTPATIENT
Start: 2017-05-11 | End: 2017-05-12

## 2017-05-11 RX ORDER — CLOPIDOGREL BISULFATE 75 MG/1
75 TABLET ORAL DAILY
Qty: 30 TABLET | Refills: 1 | Status: SHIPPED | OUTPATIENT
Start: 2017-05-11 | End: 2017-05-12

## 2017-05-11 RX ADMIN — Medication 2 G: at 08:10

## 2017-05-11 RX ADMIN — ATORVASTATIN CALCIUM 40 MG: 40 TABLET, FILM COATED ORAL at 19:49

## 2017-05-11 RX ADMIN — PANTOPRAZOLE SODIUM 40 MG: 40 TABLET, DELAYED RELEASE ORAL at 19:49

## 2017-05-11 RX ADMIN — CLOPIDOGREL 75 MG: 75 TABLET, FILM COATED ORAL at 08:10

## 2017-05-11 RX ADMIN — Medication 12.5 MG: at 22:28

## 2017-05-11 RX ADMIN — SODIUM CHLORIDE, PRESERVATIVE FREE 5 ML: 5 INJECTION INTRAVENOUS at 19:30

## 2017-05-11 RX ADMIN — SODIUM CHLORIDE, PRESERVATIVE FREE 5 ML: 5 INJECTION INTRAVENOUS at 06:30

## 2017-05-11 RX ADMIN — INSULIN ASPART 1 UNITS: 100 INJECTION, SOLUTION INTRAVENOUS; SUBCUTANEOUS at 12:21

## 2017-05-11 RX ADMIN — LISINOPRIL 7.5 MG: 5 TABLET ORAL at 19:51

## 2017-05-11 RX ADMIN — SODIUM CHLORIDE, PRESERVATIVE FREE 5 ML: 5 INJECTION INTRAVENOUS at 18:12

## 2017-05-11 RX ADMIN — LISINOPRIL 10 MG: 10 TABLET ORAL at 08:10

## 2017-05-11 RX ADMIN — MULTIPLE VITAMINS W/ MINERALS TAB 1 TABLET: TAB at 08:10

## 2017-05-11 RX ADMIN — INSULIN GLARGINE 25 UNITS: 100 INJECTION, SOLUTION SUBCUTANEOUS at 08:10

## 2017-05-11 RX ADMIN — DIGOXIN 125 MCG: 0.12 TABLET ORAL at 08:10

## 2017-05-11 RX ADMIN — SODIUM CHLORIDE, PRESERVATIVE FREE 5 ML: 5 INJECTION INTRAVENOUS at 07:53

## 2017-05-11 RX ADMIN — PANTOPRAZOLE SODIUM 40 MG: 40 TABLET, DELAYED RELEASE ORAL at 08:10

## 2017-05-11 RX ADMIN — Medication 0.5 MG: at 17:43

## 2017-05-11 RX ADMIN — HYDROMORPHONE HYDROCHLORIDE 0.3 MG: 1 INJECTION, SOLUTION INTRAMUSCULAR; INTRAVENOUS; SUBCUTANEOUS at 12:39

## 2017-05-11 RX ADMIN — Medication 12.5 MG: at 13:58

## 2017-05-11 RX ADMIN — Medication: at 12:39

## 2017-05-11 NOTE — PLAN OF CARE
Problem: Goal Outcome Summary  Goal: Goal Outcome Summary  1) pt will be hemodynamically stable  2) pt will tolerate weaning of IABP  3) pain will be adequately controlled     OT 6C: Pt making steady progress in therapies towards PLOF. Pt seen for functional transfers and ADLs. Pt mod A for bed mobility and min A for sit <> stands. Pt ambulated EOB <> bathroom with min-mod A and FWW, pt required VCs for navigation and LLE foot placement. Pt completed full shower seated on shower chair. Pt required mod A for LB dressing and LB bathing and min A for UE dressing and bathing. Pt limited by generalized weakness (L side weaker than R), cognitive impairments, and balance deficits leading to decreased independence with ADLs/IADLs.      REC ARU as pt highly motivated to return to PLOF/mod I, strong support system, and demonstrated needs for OT/PT/SLP.

## 2017-05-11 NOTE — PLAN OF CARE
Problem: Goal Outcome Summary  Goal: Goal Outcome Summary  1) pt will be hemodynamically stable  2) pt will tolerate weaning of IABP  3) pain will be adequately controlled     PT 6C: Pt feeling more fatigued today, but still motivated to progress therapy. Completes bed mobility with ModA of 1. Sit<>stand transfer with Iveth of 1 and cues for safety and technique. Pt tends to move quickly - needs cues to slow down and take time. Ambulating 50 ft x2 and 100 ft x2 with Iveth of 1 and close w/c follow. Pt with L LE weakness - increases with instability and L foot drag and buckling with fatigue. Iveth needed at times for stability. Needs cues for rest breaks as pt likes to push self. VSS on RA with activity, SpO2 96%, HR 96 bpm.      Recommend discharge to ARU once medically appropriate.

## 2017-05-11 NOTE — PLAN OF CARE
Problem: Goal Outcome Summary  Goal: Goal Outcome Summary  1) pt will be hemodynamically stable  2) pt will tolerate weaning of IABP  3) pain will be adequately controlled     Outcome: No Change  D/A/I:  Patient disoriented to time, was reoriented as needed.  Denied pain, palpitations, difficulty breathing, SOB, dizziness, and nausea.  Lung sounds clear to auscultation, no abnormal heart sounds noted.  See PCS for assessment of incisions.  Patient spontaneously voided 200 ml of clear urine at beginning of shift, stated he felt he had emptied his bladder.  Post-void bladder scan indicated volume of 700 ml, was straight cathed for that amount.  In sinus tachycardia with HR 100s, SBP 80s with MAPs 60s-70s, SaO2 95-98% on room air.  Magnesium replaced per protocol.  Ambulated in room and hallway with 2-person assist and use of walker, was regularly repositioned in bed.  Daughter stayed with patient during shift.     P:  Continue to monitor pain, VS, heart rhythm, wound and incision integrity, bladder volume, fluid status, bowel status, cardiac and respiratory status.  Notify care team of changes in patient condition or other concerns.  Expected to discharge to ARU tomorrow, transport time scheduled for 1100.

## 2017-05-11 NOTE — PROGRESS NOTES
Calorie Counts  Intake recorded for: 5/10 Kcals: 1044  Protein: 47g  # Meals Recorded: 100% omelet w/ mushrooms, onion &spinach, diet lemonade, 755 tofu garlic salvador stir shay, 33% chicken spinach salad   # Supplements Recorded: 60% Ensure Clear, 50% Magic Cup

## 2017-05-11 NOTE — PLAN OF CARE
Problem: Goal Outcome Summary  Goal: Goal Outcome Summary  1) pt will be hemodynamically stable  2) pt will tolerate weaning of IABP  3) pain will be adequately controlled     Outcome: No Change  D: S/p STEMI, Cardiogenic shock, PCI, Mitral clip.  I/A: VSS. Denies pain. Sinus rhythm/sinus tach. Chavis removed yesterday, small amt of incontinence, otherwise urinating regularly. Tube feeding changed to nocturnal--tolerating well. Up with assist x1 to restroom.  P: Continue to monitor.

## 2017-05-11 NOTE — PLAN OF CARE
Problem: Goal Outcome Summary  Goal: Goal Outcome Summary  1) pt will be hemodynamically stable  2) pt will tolerate weaning of IABP  3) pain will be adequately controlled     Outcome: Improving  Patient up with 1-2 assist to the bathroom for voiding and bm. Patient needs reminders to look straight ahead when ambulating.

## 2017-05-11 NOTE — PROGRESS NOTES
"[facility] [place]  Cardiology II Service / Advanced Heart Failure    Interval History:   No acute events overnight, notes reviewed.  Continued to have significant diureses overnight.  PVR this AM showed continued retention.  Straight cath successfully.  Some mild confusion upon waking but then clears.  No chest pain or increased SOB.     Examination:  BP (!) 87/67 (BP Location: Right arm)  Pulse 105  Temp 98.3  F (36.8  C) (Oral)  Resp 18  Ht 1.575 m (5' 2.01\")  Wt 53.8 kg (118 lb 11.2 oz)  SpO2 95%  BMI 21.7 kg/m2     GEN: NAD, lying in bed  NECK: Full ROM, no JVP appreciated  RESP: Chest clear, no wheeze or crackle; subclavian bandage c/d/i  CV: RRR, no m/c/r  ABD: Soft, nontender to palpation, no HSM, +BS  EXT: WWP, no edema   SKIN: Normal skin turgor, no rash     Intake/Output Summary (Last 24 hours) at 05/11/17 1358  Last data filed at 05/11/17 1300   Gross per 24 hour   Intake             1515 ml   Output             3275 ml   Net            -1760 ml     Cr 0.86     Imaging Studies:    CTA head and neck  Impression:   1. Head CTA demonstrates at least moderate stenosis of both cavernous  internal carotid arteries. Diffuse mild to moderate narrowing of the  basilar artery. Additional areas of mild bilateral M2 segment  narrowing, presumably also related to atherosclerosis.  2. No aneurysm of the major intracranial arteries.   3. Neck CTA demonstrates no carotid artery stenosis. Short segment  mild narrowing of the proximal right V2 segment.  4. No change in probable bilateral cerebral white matter infarcts. No  acute intracranial hemorrhage    Echo 3/27  The visual ejection fraction is estimated at 30-35%.  There is extensive inferior, inferoseptal, and inferolateral wall akinesis.  Basal/mid lateral wall hypokinesis  There is moderate to severe septal hypokinesis.  Septal wall motion abnormality may reflect pacemaker activation.  There is a catheter/pacemaker lead seen in " the right ventricle.  The right ventricle is mildly dilated.  Severely decreased right ventricular systolic function  There is no pericardial effusion.  The rhythm was paced.  Compared to the prior study, the LVEF appears somewhat decreased. Septal wall  motion abnormality larger- may reflect, in part, that patient in sinus tach on  prior study, and ventricular paced now. No hemodynamically significant  valvular abnormalities on 2D or color flow imaging     CT chest/abdomen 3/27   IMPRESSION:   1. Small mediastinal hemorrhage, small bilateral pleural effusions  which may be hemorrhagic. Small intraperitoneal hemorrhage. Minimal  pericardial hemorrhage.  2. Minimal pneumoperitoneum in the left paracolic gutter, of unknown  significance. No pneumatosis as clinically questioned.  3. IABP slightly low in position.  4. Bilateral pulmonary dependent consolidations represent compression  atelectasis, however differentials include aspiration.     Cath 3/26 [facility]  SUMMARY:   --Inferior STEMI w/ late presentation. Culprit dictated by complete  heart block, and cardiogenic shock. Emergent echo in the Cath Lab also  shows significant RV dysfunction.  --Three vessel coronary artery disease with diffuse coronary disease  out to the distal vessels  --Successful POBA of the prox and mid-RCA. Stent was not placed, in  anticipation he may need to be considered for bypass surgery in the  near future  --Insertion of a temporary pacemaker for bradycardia (AVB) and  hypotension  --Insertion of a IABP  --Upper GI bleed consistent with Kaylin-[facility]     [facility] 3/27     CT head 3/28: normal      Echo 3/29  Interpretation Summary  Limited study. Ischemic CM. Moderately (EF 30-35%) reduced left ventricular  function is present. Traced at 32%.  Posterior wall akinesis is present. Lateral wall akinesis is present. Inferior  wall akinesis is present.  Right ventricular function, chamber size, wall motion, and thickness  are  normal.  Dilation of the inferior vena cava is present with abnormal respiratory  variation in diameter.     Compared to prior study, RV fxn is improved.     Echo 3/31  Left Ventricle  The Ejection Fraction is estimated at 50-55%. Inferior,posterior,lateral,basal  septal wall akinesis as reported before. No change.     CORONARY ANGIOGRAM 4/1:   1. Both coronary arteries arise from their respective cusps.  2. Right dominant.  3. LM has mild luminal irregularities.   4. LAD supplies the apex along with the RCA (type 2 LAD) and gives rise to septal perforators and a large and branching D1. There are widely patent stents extending from the proximal to mid LAD. The dLAD has mild to moderate disease to 30% stenosis. The D1 is jailed by the LAD stents, but has EBER 3 flow with disease to 30% stenosis.   5. The small ramus is no longer present.  6. LCX is occluded at the ostium.   7. RCA gives rise to PL branches and supplies PDA. There are widely patent stents extending from the proximal to mid RCA. The remainder of the mRCA has disease to 50% stenosis and the dRCA has disease to 10% stenosis. The RPDA has a widely patent stent and the remainder of the artery has mild disease to 10% stenosis. The RPLA has small vessels with diffuse disease including a distal branch occlusion. The RPLA supplies some small collateral branches to the dLCx.      COMPLICATIONS:  1. None      SUMMARY:   1. Cardiogenic shock following an inferior STEMI.  2. The LCx re-occlusion is not surprising as the recently revascularized LCx  had poor outflow and the revascularized portion did not supply a large territory (limited to no flow was present distal to the stented segment immediately following stenting). Repeat revascularization of the LCx would result in the same outcome of repeat closure and also risk embolizing thrombus from the LCx into the LAD so no treatment of the occluded LCx was performed.   3. Three vessel coronary artery disease  with patent LAD and RCA stents and an occluded LCx.     Echo 4/3  Left ventricular size is normal.  The Ejection Fraction is estimated at 35-40%.  Inferior wall akinesis is present.  Lateral wall akinesis is present.  Posterior wall akinesis is present.  Right ventricular function, chamber size, wall motion, and thickness are  normal.  Moderate mitral insufficiency is present.  Moderate tricuspid insufficiency is present.  Right ventricular systolic pressure is 47mmHg above the right atrial pressure.  The inferior vena cava is normal.     Echo 4/5  Moderately (EF 35-40%) reduced left ventricular function is present. Traced at  40%.  Moderate mitral insufficiency is present.  The cause of the mitral insufficiency appears to be restricted posterior  leaflet from posterior MI. No mitral leaflet pathology seen.  Dilation of the inferior vena cava is present with abnormal respiratory  variation in diameter.     CT abd without contrast 4/4  IMPRESSION:   1. No evidence of ischemic bowel, obstruction, or intra-abdominal  infection.  2. Bibasilar patchy pulmonary opacities likely represent combination  of aspiration and atelectasis.  3. Small amount of simple free fluid within the lower abdomen.  4. Small left retroperitoneal hematoma along the iliac vasculature  likely postprocedural.   5. Unchanged size of bilateral pleural effusions, which now consist of  simple fluid.    Assessment:  51 year old man presented with cardiogenic shock from inferior STEMI s/p RCA aspiration thrombectomy and POBA on 3/26 at [facility] s/p IABP and initiation of Dopamine, also during procedure, CHB s/p temp pacer, emesis/hematmesis and altered mental status s/p intubation transferred here for consideration of mechanical support on 3/27. Had PCI to RCA, LAD and LCx (on ASA and plavix) started on epinephrine in addition to dopamine, post procedure developed distributive shock picture from infection (aspiration pneumonia? Sputum cx growing staph  aureus, on vanco and zosyn) vs post-MI SIRS response. Developed cardiogenic shock now s/p IABP, removed 5/6/17.      Plan Today:  - Decrease bumex to 1mg BID, hold today  - Reduce lisinopril to 7.5mg BID  - Ct. PT/OT, d/c to acute rehab    # Cardiogenic shock   # CAD  # Ischemic cardiomyopathy  2/2 underlying 3v CAD-has had high SVR and low CI, complicated by ischemic MR.  ICM to inferior wall STEMI s/p 7 FEMI to LAD, LCx, RCA on 3/27. LCx was  and is re-occluded on angio 4/1.  Cortisol normal.  MitraClip placed on 5/1, IABP weaned post procedure.  Discontinued IABP on 5/5.  d/c Hydralazine 50mg QID (5/8).   - Lisinopril 7.5 mg BID  - Digoxin 125mcg  - Bumex 2mg TID  - Needs plavix 1 year  - Spironolactone 12.5mg qD  - Add back BB as BP allows    # h/o CVA  CVA presenting with left hand weakness and pain.  CTA head/neck showed no hemorrhage but new areas of low attenuation in b/l hemispheres.  - Neuro consulted. Continuing Plavix and statin  - MAP goal > 70    # Mitral regurg s/p MitraClip  - Needs lifelong antiplatelet, continue Plavix for 1 year for FEMI then change to ASA    # Hypoxic respiratory failure  Intubated in the setting of worsening hypoxia on 4/25, extubated 4/28.  Thought to be aspiration PNA vs PE vs volume overload.  Treated with broad spectrum abx for two weeks starting 4/19.    - Continue to monitor volume status  - Diuresing to CVP < 12, currently on 2L NC    # Urinary tract infection  UA 4/15 with cloudy urine, 50 WBCs, many bacteria, no nitrates. UCx grew E coli - treated with CFTX (sensitive).  Fever on 4/20 and single spike 100.2 overnight with blood cultures in process (no growth thus far), PICC and SGC removed, started on Vanc and pip/tazo, treatment completed on 5/1.   - CTM WBC and fever curve    # T2DM:   HA1C 7.5 on admission.  - Insulin gtt per nurising protocol with hypoglycemia precautions.   - SQ Lantus       # Lower GIB  Had several maroon BMs since 4/11, Hb overall dropped by a  1.5 grams or so, heparin inf was held and GI consulted.  Colonoscopy 4/17 showed a rectal ulcer but no intervenable lesions.   - Trend hgb  - Protonix 40 PO BID    # H/o DVT  Non-occlusive clot in left IJ and left common femoral vein. S/p Heparin gtt low intensity  - Warfarin, pharmacy to dose      # Hypernatremia  - Resolved. Stopped his D5W, only getting FWF's.    # Insomnia  - Melatonin qHS  - Seroquel 12.5mg qHS    FEN: feeding tube, and dysphagia level II  Code: FULL  PPx: PPI 40mg BID, senna PRN, warfarin  Dispo:  Discharge to acute rehab, likely 5/12    LINES:  - LUE PICC   4/20    Plan of care discussed with Dr. Jamison Camilo MD, MPH  PGY-3, Internal Medicine   [phone]

## 2017-05-11 NOTE — PLAN OF CARE
"Problem: Goal Outcome Summary  Goal: Goal Outcome Summary  1) pt will be hemodynamically stable  2) pt will tolerate weaning of IABP  3) pain will be adequately controlled        SLP 6C: Pt seen for dysphagia tx. Pt and daughter report consuming regular diet well and no s/s of aspiration. Pt tolerated dry solids with no s/s of aspiration. Discussed current cognition skills, pt not concerned of changes in cognition, though daughter states some changes in memory (\"like he thought I was his sister). No other concerns. Continue to recommend regular diet and thin liquids with aspiration precautions. Cognitive-linguistic evaluation is not indicated at this time. SLP to sign off as no further skilled intervention indicated.    Speech Language Therapy Discharge Summary    Reason for therapy discharge:    All goals and outcomes met, no further needs identified.    Progress towards therapy goal(s). See goals on Care Plan in Psychiatric electronic health record for goal details.  Goals met    Therapy recommendation(s):    No further therapy is recommended.      "

## 2017-05-11 NOTE — DISCHARGE SUMMARY
Medicine Discharge Summary  Luke Henao MRN: [id number]  1965  Primary care provider: No Ref-Primary, Physician  ___________________________________          Date of Admission:  3/27/2017  Date of Discharge:  5/11/2017   Admitting Physician:  Scar Norwood MD  Discharge Physician:  Jamison Swift MD   Discharging Service:  Cardiology     Primary Provider: No Ref-Primary, Physician         Reason for Admission:   Luke Henao is a 51 year old male smoker with diabetes who was transferred from [facility] in cardiogenic shock after sustaining an inferior wall STEMI.           Discharge Diagnosis:   - Acute STEMI  - Cardiogenic shock   - Coronary artery disease  - Mitral regurgitation s/p MitraClip  - Ischemic cardiomyopathy  - Urinary tract infection  - Aspiration pneumonia  - HCAP  - Type 2 diabetes mellitus  - Lower GI bleed         Procedures & Significant Findings:   CTA head and neck  Impression:   1. Head CTA demonstrates at least moderate stenosis of both cavernous  internal carotid arteries. Diffuse mild to moderate narrowing of the  basilar artery. Additional areas of mild bilateral M2 segment  narrowing, presumably also related to atherosclerosis.  2. No aneurysm of the major intracranial arteries.   3. Neck CTA demonstrates no carotid artery stenosis. Short segment  mild narrowing of the proximal right V2 segment.  4. No change in probable bilateral cerebral white matter infarcts. No  acute intracranial hemorrhage     Echo 3/27  The visual ejection fraction is estimated at 30-35%.  There is extensive inferior, inferoseptal, and inferolateral wall akinesis.  Basal/mid lateral wall hypokinesis  There is moderate to severe septal hypokinesis.  Septal wall motion abnormality may reflect pacemaker activation.  There is a catheter/pacemaker lead seen in the right ventricle.  The right ventricle is mildly dilated.  Severely decreased right  ventricular systolic function  There is no pericardial effusion.  The rhythm was paced.  Compared to the prior study, the LVEF appears somewhat decreased. Septal wall  motion abnormality larger- may reflect, in part, that patient in sinus tach on  prior study, and ventricular paced now. No hemodynamically significant  valvular abnormalities on 2D or color flow imaging      CT chest/abdomen 3/27   IMPRESSION:   1. Small mediastinal hemorrhage, small bilateral pleural effusions  which may be hemorrhagic. Small intraperitoneal hemorrhage. Minimal  pericardial hemorrhage.  2. Minimal pneumoperitoneum in the left paracolic gutter, of unknown  significance. No pneumatosis as clinically questioned.  3. IABP slightly low in position.  4. Bilateral pulmonary dependent consolidations represent compression  atelectasis, however differentials include aspiration.      Cath 3/26 [facility]  SUMMARY:   --Inferior STEMI w/ late presentation. Culprit dictated by complete  heart block, and cardiogenic shock. Emergent echo in the Cath Lab also  shows significant RV dysfunction.  --Three vessel coronary artery disease with diffuse coronary disease  out to the distal vessels  --Successful POBA of the prox and mid-RCA. Stent was not placed, in  anticipation he may need to be considered for bypass surgery in the  near future  --Insertion of a temporary pacemaker for bradycardia (AVB) and  hypotension  --Insertion of a IABP  --Upper GI bleed consistent with [facility] 3/27 - stents placed      CT head 3/28: normal       Echo 3/29  Interpretation Summary  Limited study. Ischemic CM. Moderately (EF 30-35%) reduced left ventricular  function is present. Traced at 32%.  Posterior wall akinesis is present. Lateral wall akinesis is present. Inferior  wall akinesis is present.  Right ventricular function, chamber size, wall motion, and thickness are  normal.  Dilation of the inferior vena cava is present with  abnormal respiratory  variation in diameter.     Compared to prior study, RV fxn is improved.      Echo 3/31  Left Ventricle  The Ejection Fraction is estimated at 50-55%. Inferior,posterior,lateral,basal  septal wall akinesis as reported before. No change.      CORONARY ANGIOGRAM 4/1:   1. Both coronary arteries arise from their respective cusps.  2. Right dominant.  3. LM has mild luminal irregularities.   4. LAD supplies the apex along with the RCA (type 2 LAD) and gives rise to septal perforators and a large and branching D1. There are widely patent stents extending from the proximal to mid LAD. The dLAD has mild to moderate disease to 30% stenosis. The D1 is jailed by the LAD stents, but has EBER 3 flow with disease to 30% stenosis.   5. The small ramus is no longer present.  6. LCX is occluded at the ostium.   7. RCA gives rise to PL branches and supplies PDA. There are widely patent stents extending from the proximal to mid RCA. The remainder of the mRCA has disease to 50% stenosis and the dRCA has disease to 10% stenosis. The RPDA has a widely patent stent and the remainder of the artery has mild disease to 10% stenosis. The RPLA has small vessels with diffuse disease including a distal branch occlusion. The RPLA supplies some small collateral branches to the dLCx.      COMPLICATIONS:  1. None      SUMMARY:   1. Cardiogenic shock following an inferior STEMI.  2. The LCx re-occlusion is not surprising as the recently revascularized LCx  had poor outflow and the revascularized portion did not supply a large territory (limited to no flow was present distal to the stented segment immediately following stenting). Repeat revascularization of the LCx would result in the same outcome of repeat closure and also risk embolizing thrombus from the LCx into the LAD so no treatment of the occluded LCx was performed.   3. Three vessel coronary artery disease with patent LAD and RCA stents and an occluded LCx.      Echo  4/3  Left ventricular size is normal.  The Ejection Fraction is estimated at 35-40%.  Inferior wall akinesis is present.  Lateral wall akinesis is present.  Posterior wall akinesis is present.  Right ventricular function, chamber size, wall motion, and thickness are  normal.  Moderate mitral insufficiency is present.  Moderate tricuspid insufficiency is present.  Right ventricular systolic pressure is 47mmHg above the right atrial pressure.  The inferior vena cava is normal.      Echo 4/5  Moderately (EF 35-40%) reduced left ventricular function is present. Traced at  40%.  Moderate mitral insufficiency is present.  The cause of the mitral insufficiency appears to be restricted posterior  leaflet from posterior MI. No mitral leaflet pathology seen.  Dilation of the inferior vena cava is present with abnormal respiratory  variation in diameter.      CT abd without contrast 4/4  IMPRESSION:   1. No evidence of ischemic bowel, obstruction, or intra-abdominal  infection.  2. Bibasilar patchy pulmonary opacities likely represent combination  of aspiration and atelectasis.  3. Small amount of simple free fluid within the lower abdomen.  4. Small left retroperitoneal hematoma along the iliac vasculature  likely postprocedural.   5. Unchanged size of bilateral pleural effusions, which now consist of  simple fluid.         Consultations:   - Neurology  - Gastroenterology  - Cardiology  - Pulmonology  - Infectious disease  - Palliative care  - Social work  - Nephrology  - Cardiothoracic surgery  - Nutrition         Hospital Course by Problem:      Brief summary of the hospital course:  51 year old man presented with cardiogenic shock from inferior STEMI s/p RCA aspiration thrombectomy and POBA on 3/26 at [facility] s/p IABP and initiation of Dopamine, also during procedure, complete heart block s/p temp pacer, emesis/hematmesis and altered mental status s/p intubation transferred here for consideration of mechanical support on  3/27. Had PCI with FEMI to RCA, LAD and LCx (on ASA and plavix) started on epinephrine in addition to dopamine.  Post procedure developed distributive shock picture from infection (aspiration pneumonia? Sputum cx growing staph aureus, on vanco and zosyn) vs post-MI SIRS response.  Later again developed cardiogenic shock requiring replacement of the IABP.  MitraClip placed to mitral regurgitation.  Slowly weaned from IABP and it was removed 5/6/17.  Medications uptitrated over the next several days without issue.  Discharging to Acute rehab.      Notes:  - Needs INR check in 2-3 days  - If symptomatic hypotension, can decrease lisinopril to 5mg BID  - Monitor for urinary retention  - Monitor volume status, adjust diuretic as appropriate  - Very weak, needs assistance with all ambulation     # Cardiogenic shock   # Coronary artery disease  # Ischemic cardiomyopathy  The patient initially presented from [facility] after angio showing underlying 3v coronary artery disease that did not have intervention.  Noted at the time to have high SVR and low CI, complicated by ischemic mitral regurgitation.  He developed resultant ischemic cardiomyopathy secondary to inferior wall STEMI and had 7 FEMI to LAD, LCx, RCA on 3/27.  Change in EKG seen on 4/1 and repeat angio was performed showing LCx was  and was noted to be re-occluded (previously opened on 3/27).  Despite PCI the patient continued to have refractory cardiogenic shock and inability to wean from the IABP.  MitraClip placed on 5/1 for ischemic mitral reguritation.  The IABP was slowly weaned post procedure. Discontinued IABP on 5/5.  Heart failure medications are as follows at time of discharge:   - Lisinopril 7.5 mg BID  - Digoxin 125mcg  - Bumex 1mg BID, may need to be reduced  - Spironolactone 12.5mg qD  - Needs plavix 1 year  - Beta blocker being held in the setting of recent heart failure decompensation and will be added back as an outpatient    # Sinus  tachycardia  Persistent during hospitalization.  No current evidence of infection, hypovolemia.  Likely from recovering cardiac function.  Will be started on a betablocker as an outpatient as cardiac function/heart failure continues to improve.       # h/o CVA  CVA developed left hand weakness and pain on 4/29 and a stroke code was called. CTA head/neck showed no hemorrhage but new areas of low attenuation in b/l hemispheres.  Neuro consulted. Continuing Plavix and statin for prevention.      # Mitral regurg s/p MitraClip  Developed ischemic mitral regurgitation as a result of his STEMI.  Due to refractory cardiogenic shock and inability to wean from IABP a MitraClip procedure was performed.  Needs lifelong antiplatelet with plan to continue Plavix for 1 year for FEMI then change to ASA.     # Hypoxic respiratory failure  Initially presented intubated after STEMI.  Extubated on 4/3.  Re-intubated in the setting of worsening hypoxia on 4/25, extubated 4/28. Thought to be aspiration pneumonia/concomittant HCAP.  Had already been treated with broad spectrum antibiotic starting 4/19.  Completed 2 week course.  Continued to improve and on O2 for comfort upon transfer.       # Urinary tract infection  UA 4/15 with cloudy urine, 50 WBCs, many bacteria, no nitrates. UCx grew E coli - treated with CFTX (sensitive).  Fever on 4/20 and single spike 100.2 overnight with unremarkable blood cultures.  Completed Vanc and pip/tazo treatment course on 5/1.      # Type 2 diabetes mellitus:   HA1C 7.5 on admission.  - Insulin gtt per nurising protocol with hypoglycemia precautions.   - SQ Lantus 25u qD      # Lower GIB  Had several maroon BMs since 4/11, Hb overall dropped by a 1.5 grams or so, heparin infusion was held and GI consulted. Colonoscopy 4/17 showed a rectal ulcer thought secondary to retal tube but no intervenable lesions. Hgb stable upon discharge.  Will continue on Protonix 40 PO qD.     # DVT  Non-occlusive clot in left  "IJ and left common femoral vein. S/p Heparin gtt low intensity.  Considered provoked DVT in the setting of prolonged hospitalization and critical illness.  Should be on warfarin for 3-6 months.  INR goal of 2-3 and will need to follow up with warfarin clinic     # Urinary retention  Patient require dawkins catheter for much of hospitalization.  Discontinued several days prior to discharge.  Having some issues with urinary retention.  No history of BPH.  Likely secondary to prolonged dawkins.  Continue to monitor with bladder scans and intermittent straight cath.       # Hypernatremia  Resolved.     # Insomnia  Difficulty sleeping intermittently throughout admission.  Melatonin qHS and Seroquel 12.5mg qHS.    Physical Exam on day of Discharge:  Blood pressure 107/78, pulse 105, temperature 97.4  F (36.3  C), temperature source Oral, resp. rate 18, height 1.575 m (5' 2.01\"), weight 54.8 kg (120 lb 12.8 oz), SpO2 98 %.    General: NAD, sitting at bedside eating  HEENT: Oral mucosa moist and non-erythematous, PERRLA, EOM intact  CV: Tachycardia, no murmur, clicks, rubs  Resp: CTAB, no wheezes, rhonchi  Abd: Soft, non-tender, BS+, no masses appreciated  Extremities: WWP, no pedal edema  Skin: No ulcers or lesions  Neuro: AAOx3, No lateralizing symptoms or focal neurologic deficits      Lines/Tubes:  None         Pending Results:   - None          Discharge Medications:     Current Discharge Medication List      START taking these medications    Details   melatonin 3 MG tablet Take 1 tablet (3 mg) by mouth nightly as needed for sleep  Qty: 30 tablet, Refills: 1    Associated Diagnoses: Other insomnia      insulin glargine (LANTUS) 100 UNIT/ML injection Inject 25 Units Subcutaneous every morning (before breakfast)    Associated Diagnoses: Diabetes mellitus type 2, insulin dependent (H)      !! insulin aspart (NOVOLOG PEN) 100 UNIT/ML injection Inject 1-10 Units Subcutaneous 3 times daily (before meals)    Associated Diagnoses: " Diabetes mellitus type 2, insulin dependent (H)      !! insulin aspart (NOVOLOG PEN) 100 UNIT/ML injection Inject 1-7 Units Subcutaneous At Bedtime    Associated Diagnoses: Diabetes mellitus type 2, insulin dependent (H)      acetaminophen (TYLENOL) 325 MG tablet Take 2 tablets (650 mg) by mouth every 4 hours as needed for mild pain or fever  Qty: 100 tablet    Associated Diagnoses: Coronary artery disease involving native coronary artery of native heart without angina pectoris      Warfarin Therapy Reminder 1 each continuous prn    Associated Diagnoses: Deep vein thrombosis (DVT) of left lower extremity, unspecified chronicity, unspecified vein (H)      atorvastatin (LIPITOR) 40 MG tablet 1 tablet (40 mg) by Oral or Feeding Tube route daily  Qty: 30 tablet, Refills: 1    Associated Diagnoses: Coronary artery disease involving native coronary artery of native heart without angina pectoris; Cardiogenic shock (H)      lisinopril (PRINIVIL/ZESTRIL) 2.5 MG tablet Take 3 tablets (7.5 mg) by mouth 2 times daily  Qty: 30 tablet, Refills: 1    Associated Diagnoses: Coronary artery disease involving native coronary artery of native heart without angina pectoris      QUEtiapine (SEROQUEL) 25 MG tablet 0.5 tablets (12.5 mg) by Oral or Feeding Tube route At Bedtime  Qty: 60 tablet, Refills: 1    Associated Diagnoses: Coronary artery disease involving native coronary artery of native heart without angina pectoris      digoxin (LANOXIN) 125 MCG tablet Take 1 tablet (125 mcg) by mouth daily  Qty: 30 tablet, Refills: 1    Associated Diagnoses: Coronary artery disease involving native coronary artery of native heart without angina pectoris; Cardiogenic shock (H)      bumetanide (BUMEX) 1 MG tablet Take 1 tablet (1 mg) by mouth 2 times daily  Qty: 30 tablet, Refills: 1    Associated Diagnoses: Coronary artery disease involving native coronary artery of native heart without angina pectoris      spironolactone (ALDACTONE) 25 MG tablet  Take 0.5 tablets (12.5 mg) by mouth daily  Qty: 30 tablet, Refills: 1    Associated Diagnoses: Coronary artery disease involving native coronary artery of native heart without angina pectoris      clopidogrel (PLAVIX) 75 MG tablet Take 1 tablet (75 mg) by mouth daily  Qty: 30 tablet, Refills: 1    Associated Diagnoses: ST elevation myocardial infarction (STEMI), unspecified artery (H)      pantoprazole (PROTONIX) 40 MG EC tablet Take 1 tablet (40 mg) by mouth daily  Qty: 30 tablet, Refills: 1    Associated Diagnoses: Gastrointestinal hemorrhage associated with other gastritis       !! - Potential duplicate medications found. Please discuss with provider.      CONTINUE these medications which have NOT CHANGED    Details   ELOCON 0.1 % EX CREA apply to bug bites qd-bid prn  Qty: 30g, Refills: 0    Associated Diagnoses: Bug bites      OCUFLOX 0.3 % OP SOLN 1-2 gtts in eye q 4-6 hrs   Qty: 1, Refills: 0    Associated Diagnoses: Hordeolum                  Discharge Instructions and Follow-Up:     Discharge Procedure Orders  General info for SNF   Order Comments: Length of Stay Estimate: Short Term Care: Estimated # of Days <30  Condition at Discharge: Improving  Level of care:skilled   Rehabilitation Potential: Good  Admission H&P remains valid and up-to-date: Yes  Recent Chemotherapy: N/A  Use Nursing Home Standing Orders: Yes     Mantoux instructions   Order Comments: Give two-step Mantoux (PPD) Per Facility Policy Yes     Intake and output   Order Comments: Every shift     Daily weights   Order Comments: Call Provider for weight gain of more than 2 pounds per day or 5 pounds per week.     Activity - Up ad danae   Order Specific Question Answer Comments   Is discharge order? Yes      Reason for your hospital stay   Order Comments: Dear Luke Henao,      You were hospitalized at [facility] with a heart attack.  Over your hospitalization your condition improved and today you are ready to be  discharged to acute rehab.  If you continue your current therapy you should continue to improve but if you develop fever, shortness of breath, light headedness or chest pain please seek medical attention.    Please get the following tests done:  - INR 2-3 days  - BMP 1 week    Please set up appointment with:  - Cardiology 2-4 weeks for medication optimization    It was a pleasure meeting with you today. Thank you for allowing me and my team the privilege of caring for you today.  Please let us know if there is anything else we can do for you so that we can be sure you are leaving completely satisfied with your care experience.    Your hospital unit at the time of discharge is 6c so if you have any questions please call the hospital at [phone] and ask to talk to a nurse on 6c.        Take care!    Luis Camilo MD, MPH  Internal Medicine PGY-3     Activity - Up ad danae   Order Specific Question Answer Comments   Is discharge order? Yes      Follow Up and recommended labs and tests   Order Comments: - Follow up with cardiology 2-4 weeks  - Warfarin clinic, INR goal 2-3     Full Code     Nutrition Services Adult IP Consult   Order Comments: Reason:  Currently on cyclic tube feeds, assistance with transition to PO diet     Advance Diet as Tolerated   Order Comments: Follow this diet upon discharge: Orders Placed This Encounter     Calorie Counts     Fluid restriction 1500 ML FLUID     Calorie Counts     Calorie Counts     Snacks/Supplements Adult: With Meals     Adult Formula Drip Feeding: Specified Time Peptamen 1.5; Nasoduodenal tube; Goal Rate: 60 mL/hr x 12 hrs (6p-6a); mL/hr; Medication - Tube Feeding Flush Frequency: At least 15-30 mL water before and after medication administration and with tube   Order Specific Question Answer Comments   Is discharge order? Yes                  Discharge Disposition:   Acute rehab         Condition on Discharge:   Discharge condition: Stable   Code status on discharge: Full  Code        Date of service: 5/11/2017    The patient was discussed with Dr. Swift.    Luis Camilo MD, MPH  PGY-3, Internal Medicine   [phone]

## 2017-05-12 ENCOUNTER — CARE COORDINATION (OUTPATIENT)
Dept: CARE COORDINATION | Facility: CLINIC | Age: 52
End: 2017-05-12

## 2017-05-12 ENCOUNTER — HOSPITAL ENCOUNTER (INPATIENT)
Facility: CLINIC | Age: 52
LOS: 14 days | Discharge: HOME | DRG: 056 | End: 2017-05-26
Attending: PHYSICAL MEDICINE & REHABILITATION | Admitting: PHYSICAL MEDICINE & REHABILITATION
Payer: COMMERCIAL

## 2017-05-12 ENCOUNTER — APPOINTMENT (OUTPATIENT)
Dept: OCCUPATIONAL THERAPY | Facility: CLINIC | Age: 52
DRG: 228 | End: 2017-05-12
Attending: INTERNAL MEDICINE
Payer: COMMERCIAL

## 2017-05-12 ENCOUNTER — APPOINTMENT (OUTPATIENT)
Dept: CT IMAGING | Facility: CLINIC | Age: 52
DRG: 228 | End: 2017-05-12
Attending: INTERNAL MEDICINE
Payer: COMMERCIAL

## 2017-05-12 VITALS
HEART RATE: 105 BPM | OXYGEN SATURATION: 98 % | SYSTOLIC BLOOD PRESSURE: 107 MMHG | WEIGHT: 120.8 LBS | TEMPERATURE: 97.4 F | HEIGHT: 62 IN | RESPIRATION RATE: 18 BRPM | BODY MASS INDEX: 22.23 KG/M2 | DIASTOLIC BLOOD PRESSURE: 78 MMHG

## 2017-05-12 DIAGNOSIS — N40.1 BENIGN PROSTATIC HYPERPLASIA WITH LOWER URINARY TRACT SYMPTOMS, UNSPECIFIED MORPHOLOGY: ICD-10-CM

## 2017-05-12 DIAGNOSIS — I82.409 ACUTE DEEP VEIN THROMBOSIS (DVT) OF OTHER VEIN OF LOWER EXTREMITY, UNSPECIFIED LATERALITY (H): ICD-10-CM

## 2017-05-12 DIAGNOSIS — I25.10 CORONARY ARTERY DISEASE INVOLVING NATIVE CORONARY ARTERY OF NATIVE HEART WITHOUT ANGINA PECTORIS: ICD-10-CM

## 2017-05-12 DIAGNOSIS — I25.5 ISCHEMIC CARDIOMYOPATHY: ICD-10-CM

## 2017-05-12 DIAGNOSIS — E11.9 DIABETES MELLITUS TYPE 2, INSULIN DEPENDENT (H): ICD-10-CM

## 2017-05-12 DIAGNOSIS — I63.40 CEREBROVASCULAR ACCIDENT (CVA) DUE TO EMBOLISM OF CEREBRAL ARTERY (H): ICD-10-CM

## 2017-05-12 DIAGNOSIS — J30.2 SEASONAL ALLERGIC RHINITIS, UNSPECIFIED ALLERGIC RHINITIS TRIGGER: ICD-10-CM

## 2017-05-12 DIAGNOSIS — I69.919 COGNITIVE DEFICITS AS LATE EFFECT OF CEREBROVASCULAR DISEASE: ICD-10-CM

## 2017-05-12 DIAGNOSIS — R41.89 IMPAIRED COGNITION: ICD-10-CM

## 2017-05-12 DIAGNOSIS — K59.00 CONSTIPATION, UNSPECIFIED CONSTIPATION TYPE: ICD-10-CM

## 2017-05-12 DIAGNOSIS — Z79.4 DIABETES MELLITUS TYPE 2, INSULIN DEPENDENT (H): ICD-10-CM

## 2017-05-12 DIAGNOSIS — R57.0 CARDIOGENIC SHOCK (H): ICD-10-CM

## 2017-05-12 DIAGNOSIS — K29.61 GASTROINTESTINAL HEMORRHAGE ASSOCIATED WITH OTHER GASTRITIS: ICD-10-CM

## 2017-05-12 DIAGNOSIS — I82.402 DEEP VEIN THROMBOSIS (DVT) OF LEFT LOWER EXTREMITY, UNSPECIFIED CHRONICITY, UNSPECIFIED VEIN (H): ICD-10-CM

## 2017-05-12 DIAGNOSIS — I21.3 ST ELEVATION MYOCARDIAL INFARCTION (STEMI), UNSPECIFIED ARTERY (H): ICD-10-CM

## 2017-05-12 PROBLEM — I63.9 STROKE (H): Status: ACTIVE | Noted: 2017-05-12

## 2017-05-12 LAB
ANION GAP SERPL CALCULATED.3IONS-SCNC: 4 MMOL/L (ref 3–14)
APTT PPP: 44 SEC (ref 22–37)
BUN SERPL-MCNC: 23 MG/DL (ref 7–30)
CA-I BLD-MCNC: 4.5 MG/DL (ref 4.4–5.2)
CALCIUM SERPL-MCNC: 7.9 MG/DL (ref 8.5–10.1)
CHLORIDE SERPL-SCNC: 104 MMOL/L (ref 94–109)
CO2 SERPL-SCNC: 30 MMOL/L (ref 20–32)
CREAT SERPL-MCNC: 0.74 MG/DL (ref 0.66–1.25)
ERYTHROCYTE [DISTWIDTH] IN BLOOD BY AUTOMATED COUNT: 17.3 % (ref 10–15)
GFR SERPL CREATININE-BSD FRML MDRD: ABNORMAL ML/MIN/1.7M2
GLUCOSE BLDC GLUCOMTR-MCNC: 137 MG/DL (ref 70–99)
GLUCOSE BLDC GLUCOMTR-MCNC: 147 MG/DL (ref 70–99)
GLUCOSE BLDC GLUCOMTR-MCNC: 246 MG/DL (ref 70–99)
GLUCOSE SERPL-MCNC: 135 MG/DL (ref 70–99)
HCT VFR BLD AUTO: 32 % (ref 40–53)
HGB BLD-MCNC: 9.8 G/DL (ref 13.3–17.7)
INR PPP: 2.79 (ref 0.86–1.14)
MAGNESIUM SERPL-MCNC: 2.3 MG/DL (ref 1.6–2.3)
MCH RBC QN AUTO: 29.6 PG (ref 26.5–33)
MCHC RBC AUTO-ENTMCNC: 30.6 G/DL (ref 31.5–36.5)
MCV RBC AUTO: 97 FL (ref 78–100)
PHOSPHATE SERPL-MCNC: 3.1 MG/DL (ref 2.5–4.5)
PLATELET # BLD AUTO: 372 10E9/L (ref 150–450)
POTASSIUM SERPL-SCNC: 4.6 MMOL/L (ref 3.4–5.3)
RBC # BLD AUTO: 3.31 10E12/L (ref 4.4–5.9)
SODIUM SERPL-SCNC: 138 MMOL/L (ref 133–144)
WBC # BLD AUTO: 8.5 10E9/L (ref 4–11)

## 2017-05-12 PROCEDURE — 25000131 ZZH RX MED GY IP 250 OP 636 PS 637: Performed by: PHYSICIAN ASSISTANT

## 2017-05-12 PROCEDURE — 40000133 ZZH STATISTIC OT WARD VISIT

## 2017-05-12 PROCEDURE — 25000132 ZZH RX MED GY IP 250 OP 250 PS 637: Performed by: STUDENT IN AN ORGANIZED HEALTH CARE EDUCATION/TRAINING PROGRAM

## 2017-05-12 PROCEDURE — 27210437 ZZH NUTRITION PRODUCT SEMIELEM INTERMED LITER

## 2017-05-12 PROCEDURE — 25000132 ZZH RX MED GY IP 250 OP 250 PS 637: Performed by: PHYSICIAN ASSISTANT

## 2017-05-12 PROCEDURE — 85610 PROTHROMBIN TIME: CPT | Performed by: INTERNAL MEDICINE

## 2017-05-12 PROCEDURE — 99239 HOSP IP/OBS DSCHRG MGMT >30: CPT | Mod: GC | Performed by: INTERNAL MEDICINE

## 2017-05-12 PROCEDURE — 85730 THROMBOPLASTIN TIME PARTIAL: CPT | Performed by: INTERNAL MEDICINE

## 2017-05-12 PROCEDURE — 97535 SELF CARE MNGMENT TRAINING: CPT | Mod: GO

## 2017-05-12 PROCEDURE — 70450 CT HEAD/BRAIN W/O DYE: CPT

## 2017-05-12 PROCEDURE — 85027 COMPLETE CBC AUTOMATED: CPT | Performed by: INTERNAL MEDICINE

## 2017-05-12 PROCEDURE — 25000132 ZZH RX MED GY IP 250 OP 250 PS 637

## 2017-05-12 PROCEDURE — 40000901 ZZH STATISTIC WOC PT EDUCATION, 0-15 MIN

## 2017-05-12 PROCEDURE — 83735 ASSAY OF MAGNESIUM: CPT | Performed by: INTERNAL MEDICINE

## 2017-05-12 PROCEDURE — 40000802 ZZH SITE CHECK

## 2017-05-12 PROCEDURE — 80048 BASIC METABOLIC PNL TOTAL CA: CPT | Performed by: INTERNAL MEDICINE

## 2017-05-12 PROCEDURE — 25000132 ZZH RX MED GY IP 250 OP 250 PS 637: Performed by: INTERNAL MEDICINE

## 2017-05-12 PROCEDURE — 99207 ZZC CONSULT E&M CHANGED TO INITIAL LEVEL: CPT | Performed by: PHYSICIAN ASSISTANT

## 2017-05-12 PROCEDURE — 00000146 ZZHCL STATISTIC GLUCOSE BY METER IP

## 2017-05-12 PROCEDURE — 97110 THERAPEUTIC EXERCISES: CPT | Mod: GO

## 2017-05-12 PROCEDURE — 27210436 ZZH NUTRITION PRODUCT SEMIELEM INTERMED CAN

## 2017-05-12 PROCEDURE — 99212 OFFICE O/P EST SF 10 MIN: CPT

## 2017-05-12 PROCEDURE — 82330 ASSAY OF CALCIUM: CPT | Performed by: INTERNAL MEDICINE

## 2017-05-12 PROCEDURE — 25000132 ZZH RX MED GY IP 250 OP 250 PS 637: Performed by: PHYSICAL MEDICINE & REHABILITATION

## 2017-05-12 PROCEDURE — 99221 1ST HOSP IP/OBS SF/LOW 40: CPT | Performed by: PHYSICIAN ASSISTANT

## 2017-05-12 PROCEDURE — 84100 ASSAY OF PHOSPHORUS: CPT | Performed by: INTERNAL MEDICINE

## 2017-05-12 PROCEDURE — 12800006 ZZH R&B REHAB

## 2017-05-12 PROCEDURE — T1013 SIGN LANG/ORAL INTERPRETER: HCPCS | Mod: U3

## 2017-05-12 PROCEDURE — 36592 COLLECT BLOOD FROM PICC: CPT | Performed by: INTERNAL MEDICINE

## 2017-05-12 RX ORDER — SPIRONOLACTONE 25 MG
12.5 TABLET ORAL DAILY
Status: DISCONTINUED | OUTPATIENT
Start: 2017-05-13 | End: 2017-05-26 | Stop reason: HOSPADM

## 2017-05-12 RX ORDER — CLOPIDOGREL BISULFATE 75 MG/1
75 TABLET ORAL DAILY
Status: DISCONTINUED | OUTPATIENT
Start: 2017-05-13 | End: 2017-05-26 | Stop reason: HOSPADM

## 2017-05-12 RX ORDER — LISINOPRIL 2.5 MG/1
7.5 TABLET ORAL 2 TIMES DAILY
Qty: 30 TABLET | Refills: 1 | Status: ON HOLD | DISCHARGE
Start: 2017-05-12 | End: 2017-05-25

## 2017-05-12 RX ORDER — DIGOXIN 125 MCG
125 TABLET ORAL DAILY
Status: DISCONTINUED | OUTPATIENT
Start: 2017-05-13 | End: 2017-05-26 | Stop reason: HOSPADM

## 2017-05-12 RX ORDER — ACETAMINOPHEN 325 MG/1
650 TABLET ORAL EVERY 4 HOURS PRN
Status: DISCONTINUED | OUTPATIENT
Start: 2017-05-12 | End: 2017-05-26 | Stop reason: HOSPADM

## 2017-05-12 RX ORDER — ATORVASTATIN CALCIUM 40 MG/1
40 TABLET, FILM COATED ORAL EVERY EVENING
Status: DISCONTINUED | OUTPATIENT
Start: 2017-05-12 | End: 2017-05-26 | Stop reason: HOSPADM

## 2017-05-12 RX ORDER — PANTOPRAZOLE SODIUM 40 MG/1
40 TABLET, DELAYED RELEASE ORAL DAILY
Qty: 30 TABLET | Refills: 1 | Status: ON HOLD | DISCHARGE
Start: 2017-05-12 | End: 2017-05-25

## 2017-05-12 RX ORDER — DEXTROSE MONOHYDRATE 25 G/50ML
25-50 INJECTION, SOLUTION INTRAVENOUS
Status: DISCONTINUED | OUTPATIENT
Start: 2017-05-12 | End: 2017-05-26 | Stop reason: HOSPADM

## 2017-05-12 RX ORDER — CLOPIDOGREL BISULFATE 75 MG/1
75 TABLET ORAL DAILY
Qty: 30 TABLET | Refills: 1 | Status: ON HOLD | DISCHARGE
Start: 2017-05-12 | End: 2017-05-25

## 2017-05-12 RX ORDER — NICOTINE POLACRILEX 4 MG
15-30 LOZENGE BUCCAL
Status: DISCONTINUED | OUTPATIENT
Start: 2017-05-12 | End: 2017-05-26 | Stop reason: HOSPADM

## 2017-05-12 RX ORDER — BUMETANIDE 1 MG/1
1 TABLET ORAL
Status: DISCONTINUED | OUTPATIENT
Start: 2017-05-12 | End: 2017-05-13

## 2017-05-12 RX ORDER — SPIRONOLACTONE 25 MG/1
12.5 TABLET ORAL DAILY
Qty: 30 TABLET | Refills: 1 | Status: ON HOLD | DISCHARGE
Start: 2017-05-12 | End: 2017-05-25

## 2017-05-12 RX ORDER — BUMETANIDE 1 MG/1
1 TABLET ORAL
Qty: 30 TABLET | Refills: 1 | Status: ON HOLD | DISCHARGE
Start: 2017-05-12 | End: 2017-05-25

## 2017-05-12 RX ORDER — QUETIAPINE FUMARATE 25 MG/1
12.5 TABLET, FILM COATED ORAL AT BEDTIME
Qty: 60 TABLET | Refills: 1 | Status: ON HOLD | DISCHARGE
Start: 2017-05-12 | End: 2017-05-25

## 2017-05-12 RX ORDER — LANOLIN ALCOHOL/MO/W.PET/CERES
3 CREAM (GRAM) TOPICAL
Status: DISCONTINUED | OUTPATIENT
Start: 2017-05-12 | End: 2017-05-26 | Stop reason: HOSPADM

## 2017-05-12 RX ORDER — LANOLIN ALCOHOL/MO/W.PET/CERES
3 CREAM (GRAM) TOPICAL
Qty: 30 TABLET | Refills: 1 | Status: ON HOLD | DISCHARGE
Start: 2017-05-12 | End: 2017-05-25

## 2017-05-12 RX ORDER — DIGOXIN 125 MCG
125 TABLET ORAL DAILY
Qty: 30 TABLET | Refills: 1 | Status: ON HOLD | DISCHARGE
Start: 2017-05-12 | End: 2017-05-25

## 2017-05-12 RX ORDER — ACETAMINOPHEN 325 MG/1
650 TABLET ORAL EVERY 4 HOURS PRN
Qty: 100 TABLET | Status: ON HOLD | DISCHARGE
Start: 2017-05-12 | End: 2017-05-25

## 2017-05-12 RX ORDER — ATORVASTATIN CALCIUM 40 MG/1
40 TABLET, FILM COATED ORAL DAILY
Qty: 30 TABLET | Refills: 1 | Status: ON HOLD | DISCHARGE
Start: 2017-05-12 | End: 2017-05-25

## 2017-05-12 RX ORDER — WARFARIN SODIUM 3 MG/1
3 TABLET ORAL DAILY
Qty: 30 TABLET | Status: ON HOLD | DISCHARGE
Start: 2017-05-12 | End: 2017-05-25

## 2017-05-12 RX ADMIN — INSULIN ASPART 1 UNITS: 100 INJECTION, SOLUTION INTRAVENOUS; SUBCUTANEOUS at 22:29

## 2017-05-12 RX ADMIN — DIGOXIN 125 MCG: 0.12 TABLET ORAL at 08:31

## 2017-05-12 RX ADMIN — INSULIN GLARGINE 25 UNITS: 100 INJECTION, SOLUTION SUBCUTANEOUS at 08:35

## 2017-05-12 RX ADMIN — BUMETANIDE 1 MG: 2 TABLET ORAL at 08:32

## 2017-05-12 RX ADMIN — Medication 0.5 MG: at 17:44

## 2017-05-12 RX ADMIN — BUMETANIDE 1 MG: 1 TABLET ORAL at 15:56

## 2017-05-12 RX ADMIN — CLOPIDOGREL 75 MG: 75 TABLET, FILM COATED ORAL at 08:32

## 2017-05-12 RX ADMIN — Medication 12.5 MG: at 20:09

## 2017-05-12 RX ADMIN — MULTIPLE VITAMINS W/ MINERALS TAB 1 TABLET: TAB at 08:32

## 2017-05-12 RX ADMIN — INSULIN ASPART 1 UNITS: 100 INJECTION, SOLUTION INTRAVENOUS; SUBCUTANEOUS at 17:43

## 2017-05-12 RX ADMIN — ATORVASTATIN CALCIUM 40 MG: 40 TABLET, FILM COATED ORAL at 20:09

## 2017-05-12 RX ADMIN — MELATONIN TAB 3 MG 3 MG: 3 TAB at 21:34

## 2017-05-12 RX ADMIN — LISINOPRIL 7.5 MG: 5 TABLET ORAL at 08:31

## 2017-05-12 RX ADMIN — Medication 12.5 MG: at 08:43

## 2017-05-12 RX ADMIN — PANTOPRAZOLE SODIUM 40 MG: 40 TABLET, DELAYED RELEASE ORAL at 08:31

## 2017-05-12 NOTE — PLAN OF CARE
Problem: Goal/Outcome  Goal: Goal Outcome Summary  Outcome: No Change  FOCUS/GOAL  Bladder management and Medical management     ASSESSMENT, INTERVENTIONS AND CONTINUING PLAN FOR GOAL:  Pt is a new admission, AxO x4, and left side weakness, and on reg diet with meal set ups, and on TF as well. Pt's daughter is here and very helpful. Pt had some retention and straight cath was done for 950cc upon admission and PMR ordered  For dwakins and dawkins placed around 1550h with 600cc clear urine output.   PM RN knows that pt needs full admission except the skin part, which is already done and has pressure ulcer on his coccyx and open wound on his rt chest.   Nursing will cont to follow orders and assist pt as needed     FYI:  pls follow the new order for wound cares

## 2017-05-12 NOTE — PROGRESS NOTES
WOC consult to assess stage 3 pressure ulcer on coccyx, present on admit to ARU.  Assessed by WOC on [place] this am, see note dated today at 11:04 am.  Continued same wound care POC, will see early next week for further assessment.      Plan of care for wound located on Coccyx: Clean wound with Microklenz, Apply a small amount of Coloplast Triad Barrier directly to wound, Cover with Mepilex dressing changer daily.  Be sure to use chair cushion or z-yarelis positioner to adequately off load pressure at coccyx and decrease discomfort.    It is not recommended to use morphine gel at wound site due to excess moisture and analgesic effect masking pain from pressure.  Instead use offloading interventions to decrease discomfort.        Spoke with Charge RN, Pt and Family to update.    Face to face time: 5 minutes

## 2017-05-12 NOTE — PROGRESS NOTES
Patient was discharged to [facility] so they will handle post discharge follow up cares and coordination          Discharge Disposition: ARU  [facility]

## 2017-05-12 NOTE — PROGRESS NOTES
[facility]  WO Nurse Inpatient Adult Pressure INJURY (PI) Wound Assessment     Follow up assessment of PU(s) on pt's:   Coccyx    Data:   Patient History:      per MD note(s): 51 year old man presented with cardiogenic shock from inferior STEMI s/p RCA aspiration thrombectomy and POBA on 3/26 at [facility] s/p IABP and initiation of Dopamine, also during procedure, CHB s/p temp pacer, emesis/hematmesis and altered mental status s/p intubation transferred here for consideration of mechanical support on 3/27. Had PCI to RCA, LAD and LCx (on ASA and plavix) started on epinephrine in addition to dopamine, post procedure developed distributive shock picture from infection (aspiration pneumonia? Sputum cx growing staph aureus, on vanco and zosyn) vs post-MI SIRS response. Currently in cardiogenic shock with IABP in situ        Current mattress: Atmos Air  Current pressure relieving devices:  Heel lift boots and Pillows, Z flow chair positioner    Moisture Management:  Incontinence Protocol,     Current Diet / Nutrition:       Active Diet Order      Low Saturated Fat Na <2400 mg      Advance Diet as Tolerated    Tube Feeding:   John Score  Av.3  Min: 10  Max: 20   Recent Labs   Lab Test  17   0651   17   0404   17   0359   ALBUMIN   --    --   2.4*   < >  2.2*   HGB  9.8*   < >  7.6*   < >  7.6*   INR  2.79*   < >  2.22*   < >   --    WBC  8.5   < >  6.8   < >  8.5   A1C   --    --    --    --   Below Assay Range   Canceled, Test credited  EMILIE LAW RN @ 1014 17 KLA     CRP   --    --    --    --   17.0*    < > = values in this interval not displayed.                                                                                                Pressure Injury Assessment  (location #1):   Coccyx    Wound History:   Coccyx pressure injury continues to evolve, larger and deeper. Now a full thickness Stage 3 pressure injury. Confirmed with Courtney Hodges RN, CWOCN, 17        Photo  5/12/17        Photo Coccyx 5/8/17                                             Photo, coccyx 4/26/17                                                                                                    Wound Base: Pressure injury has expanded superiorly, upper half of wound now with small area of full thickness with dermal bridge between upper and lower wound. Lower half is full thickness with exposed adipose. There is surrounding pink epidermis. Loose dry, dark epidermis at 12:00 has mostly peeled away.  Specific Dimensions (length x width x depth, in cm) :   3.2 x 1 x 0.3 cm    Palpation of the wound bed:  Bone is not palpable    Slough appearance:  none    Periwound Skin: intact,      Color: normal and consistent with surrounding tissue    Temperature  normal     Drainage:    Amount: scant,  Color: serous       Odor: none    Pain: nagging persistent. Daughter reports patient has less pain when sitting         Intervention:     Patient's chart evaluated.      John Interventions:  Current John Interventions and Care Plan reviewed and updated, appropriate at this time.    Wound was assessed.    Wound Care: was done:     Visual inspection    Cleansing with NS Microklenz    Application of Mepilex dressing with Coloplast Triad Barrier at open wound    Orders  Reviewed    Supplies  N/A    Discussed plan of care with Nurse and daughter    Demonstrate [facility] chair positioner to daughter to offload pressure to coccyx            Assessment:     Pressure Injury (PI) located on Coccyx: 3    Status: continues to evolve, wound consists of 2 open areas with a dermal bridge between             Plan:     Nursing to notify the Provider(s) and re-consult the WOC Nurse if wound(s) deteriorate(s).  Plan of care for wound located on Coccyx: Clean wound with Microklenz, Apply a small amount of Coloplast Triad Barrier directly to wound, Cover with Mepilex dressing changer daily  WOC Nurse will return: Patient discharging today  Face to  face time: 20 minutes

## 2017-05-12 NOTE — PLAN OF CARE
Problem: Goal Outcome Summary  Goal: Goal Outcome Summary  1) pt will be hemodynamically stable  2) pt will tolerate weaning of IABP  3) pain will be adequately controlled     Physical Therapy Discharge Summary     Reason for therapy discharge:    Discharged to acute rehabilitation facility.     Progress towards therapy goal(s). See goals on Care Plan in [facility] electronic health record for goal details.  Goals partially met.  Barriers to achieving goals:   discharge from facility.     Therapy recommendation(s):    Continued therapy is recommended.  Rationale/Recommendations:  increase safety and independence with functional mobility.

## 2017-05-12 NOTE — PROGRESS NOTES
Called by RN at 5:18 AM , pt had a fall when he was going back to his bed from restroom  Patient felt weak in his left leg and landed on his right arm    Vitals: HR to 118; SpO2 to 98% ; 93/71    On exam:  Non tender both upper and lower extremities; no evidence of abrasion  No evidence of head injury or spinal tenderness  B/l UE and LE-5/5; AAOx3      Plan:  CT head stat given he is on warfarin

## 2017-05-12 NOTE — PLAN OF CARE
Problem: Goal Outcome Summary  Goal: Goal Outcome Summary  1) pt will be hemodynamically stable  2) pt will tolerate weaning of IABP  3) pain will be adequately controlled     Outcome: Improving  D: Pt voided x2 overnight (since being straight cathed) and had one stool.   I: Monitored/assessed pt. Assisted with cares.  A: Pt VS stable and only c/o pain at coccyx which is covered by mepilex. TF at goal overnight via NJ. PICC lines both heparin locked.  P: Continue to monitor/assess pt, contact provider with concerns.

## 2017-05-12 NOTE — H&P
DATE OF ADMISSION:  05/12/2017      HISTORY OF PRESENT ILLNESS:  Mr. Luke Henao is a 51-year-old Sinhala gentleman who has been hospitalized since 03/26.  He presented initially to [facility] with myocardial infarction and was transferred to the [facility] for further workup and management.  He required intraaortic balloon pump for management.  Briefly in the cardiac domain, he had thrombectomy right coronary artery on 03/26 and a temporary pacer.  Subsequently had  drug-eluting stent to RCA, LAD and left circumflex, for which he is on antiplatelet with aspirin and clopidogrel.  He has been off the intraaortic balloon pump since 05/06. His postoperative course was also significant for left-sided weakness identified to be linked with an ischemic stroke.  This was bilateral in watershed distribution; however, clinically it is affecting the left side greater than the right.  He had poor oral intake augmented with nasal duodenal tube feeding, though he has advanced to a regular consistency diet and intake has been picking up.  He still has 1500 mL fluid restriction. Luke also developed a stage III coccygeal pressure ulcer.  Please see discharge summary from acute hospital stay for additional details from that portion of his stay.     Functionally Luke has been making some improvements more recently.  He has some generalized weakness but also some left side predominant weakness consistent with deconditioning and stroke respectively.  He started to do some ambulation up to 100 feet, though still with assistance and a walker.  He did have a fall earlier this morning walking to the bathroom.  A head CT was negative for any bleed or pathology.  He is communicating partially in English, though augmented with Sinhala  but still requiring some verbal cues for sequencing.  He is needing some help with dressing and other basic activities of daily living.  He is on a regular diet  augmenting with tube feeds as above.  [facility] for comprehensive cares.      PAST MEDICAL HISTORY:  Luke is a bit vague about the specifics, though he acknowledges having diabetes.  He was taking 1 medication, could not give the name, saying  it was a yellow pill for diabetes.  He denies any other medications, hospitalizations, surgeries.  He really does not follow with medical providers through his life.  PTA diabetes was managed with the health care provider through his employer.      FUNCTIONAL HISTORY:  Luke was fully independent in all regards, healthy and working prior to this hospital stay.      SOCIAL HISTORY:  He lives in [place] in a home along with his wife.  Home has 2 steps to enter, then all rooms are accessible on the main floor.  They have 4 children who are  supportive.  He works as a .      REVIEW OF SYSTEMS:  Luke has some discomfort on his sacral pressure ulcer, though denies other pain.  No headaches, change in vision or swallow.  Appetite has picked up nicely.  No nausea.  No shortness of breath, chest pain or palpitations.  He has had significant difficulty emptying his bladder.  Denies problems with bowel control.  He denies numbness, tingling or sensory changes.  He acknowledges some generalized as well as left-sided weakness.  His mood he initially reports is okay, but then he gets tearful.      PHYSICAL EXAMINATION:   VITAL SIGNS:  Temperature 97.7, blood pressure 106/75, heart rate 110, respirations 16, oxygen saturation 98% on room air.     Weight 123 pounds, height 5 feet 0 inches, body mass index 24.   GENERAL:  Luke is fully alert throughout my visit.  He does some communication in English, though often difficult to understand and encouraged use of  who is  present.  His daughter is at bedside.  She is fluent bilingual.  Does not appear to have any gross expressive or receptive language difficulties.   Difficult to tease out if there is any dysarthria with his Luxem[place]ish accent.   HEENT:  His pupils are equal.  Eyes conjugate.  Face symmetric.   CARDIOVASCULAR:  Heart sounds are distant, though regular.   LUNGS:  Clear bilaterally.   ABDOMEN:  Soft, nontender, normal bowel sounds.   EXTREMITIES:  Warm, well perfused, trace ankle-foot edema.     SKIN:  Right upper chest gauze dressing removed.  There is about 2 inches of packing into what is likely the recent intraaortic balloon pump access site.  There are some dressings over bilateral arms and left neck from intravenous access lines removed over the last few days.  These are all now closed over, after removing dressings can be left open to air.  His coccyx has a stage III pressure ulcer.  There are photographs in the chart from wound ostomy nurse this morning; I did not take down the dressing to view this directly.  Affect for the majority of the visit is euthymic to bright, though some tearfulness when discussing his plans in getting home and he wants to be there to support his children and wife.    NEUROLOGIC:  Strength exam:  Has some generalized weakness on top of some left side predominant weakness.  Would grade shoulder abduction 4 right, 4- left.  Elbow flexion 4 right, 4- left.   5- right, 4 left.  Hip flexion 3 right, 3- left.  Knee extension 4 right, 3 left.  Ankle dorsiflexion 4 right, 4- left.  Ankle plantar flexion 4 bilaterally.  Sensation to light touch is grossly symmetric to light touch in 4 extremities.  He needed assistance to transition position in bed from supine to sit as well as helping getting his legs back into bed and returning to supine.      ASSESSMENT:  A 51-year-old with functional impairments in mobility, activities of daily living and possible mild cognition following month and a half hospital stay starting with significant myocardial infarction with cardiogenic shock, but also ischemic stroke watershed distribution affecting  the left side greater than right.  Additional medical comorbidities with diabetes, hypertension, hyperlipidemia, stage III coccygeal pressure ulcer, right upper chest open wound still from recent vascular access and balloon pump.  He has adjustment disorder, not otherwise specified with some tearfulness and history of tobacco dependence.  He is very appropriate for inpatient acute rehab.        Initial individualized overall plan of care:      PLAN OF CARE:   1.  Luke should receive 60 minutes each of physical, occupational and speech therapy on a daily basis in addition to rehab nursing and close management by Physiatry.  As above deficits are from some deconditioning as well as overlying stroke but also cardiopulmonary limits with the recent MI.  He is starting to do some mobilization but he has also had a fall as recently as this morning and definitely should have assistance for all transfers and mobilization.  Until his cognition is more thoroughly established, would like to have bed and chair alarms.  Therapist to address these deficits and work on maximizing his functional recovery in these domains.  Speech therapy in the acute hospital has not really worked in the cognitive domain, more thoroughly we would like to explore this further.  Definitely utilize Congolese  for all critical conversations, though he seems to be able to function with basics such as if he is hungry or needing to go to the bathroom, etc.   2.  Rehab nursing for close support several domains.  He has had some urine retention on admission to acute rehab.  Bladder was already distended to near 1000 mL.  He is only able to void 10 mL himself and catheterize for 950.  Given this overdistention, he will require some bladder rest and we are replacing an indwelling Chavis catheter.  We will follow blood pressures, but likely will start alpha blocker, tamsulosin.  Anticipate approximately 1 week bladder rest given this extent of  distention.  A trial out before he discharges home.   3.  Nursing also on wound cares, stage III coccygeal ulcer.  Wound ostomy has been following along.  Work with pressure relieving cushion for all sitting and frequent repositioning in bed.  Has daily dressing changes outlined.  Also continue with the Nu Gauze packing in the right upper chest, monitor close for signs or symptoms of infection.   4.  We will consult the Hospitalist Service given his complex medical issues that have been improving.  With the recent cardiac stents, he continues on dual antiplatelet therapy, clopidogrel and aspirin.  He also has warfarin with the recent cardiac thrombosis and this will continue.  Target INR between 2 and 3.   5.  For additional cardiac health, he has ACE inhibitor, lisinopril, digoxin, no beta blocker currently.  Diuresis, he has Bumex and spironolactone.   6.  Diabetes.  He has recent reasonable control.  Insulin glargine and sliding scale aspart.  I suspect he may have a challenge with more detailed insulin dosing such as carb counting or sliding scale.  We will try to simplify before discharge.   7.  He has quetiapine low dose at bedtime as he had had some confusion at night.   Presumably we will work towards weaning that off before discharge.   8.  Some tearfulness.  We will follow closely with his mood and adjustment.  This may be rather transient.  He overall seems to be motivated.  Consider consultation with Health Psychology.  Currently with no psychotropic medications besides the bedtime.   quetiapine.   9.  Prognosis to discharge home is very good.  Estimated length of stay 2 weeks.      75 minutes spent with admission, 45 in direct patient and family interaction, remainder in coordination of care.         CLAUDIA RICHARDSON MD             D: 2017 15:23   T: 2017 17:40   MT: MELINDA      Name:     SYDNIE SIERRA   MRN:      [id number]        Account:      [id number]   :      1965            Admitted:     [id number]      Document: [id number]

## 2017-05-12 NOTE — H&P
Post Admission Physician Evaluation:    I have compared Luke's condition on admission to acute rehabilitation to that outlined in the preadmission screen. History and physical exam performed by me. No significant differences are identified and the patient remains appropriate for an inpatient rehabilitation facility level of care to manage medical issues and address functional impairments due to (rehabilitation diagnosis) stroke, MI with cardiogenic shock.    Comorbid medical conditions being managed: DM, HTN, urine retention, stage 3 coccyx ulcer    Prior functional level: fully independent    Present function: some early walking but with assistance only. Poor oral intake but improving, still ND in place.     Anticipated rehabilitation course:   Will benefit from intensive rehabilitation includin minutes each of PT, OT and SLP  Rehabilitation nursing  Close management by physiatry    Prognosis: good to discharge home    Estimated length of stay: 2 weeks

## 2017-05-12 NOTE — PLAN OF CARE
Problem: Goal Outcome Summary  Goal: Goal Outcome Summary  1) pt will be hemodynamically stable  2) pt will tolerate weaning of IABP  3) pain will be adequately controlled     OT 6C: Pt now s/p fall overnight however denies pain and no change in functional status noted. Pt mod A for bed mobility. Pt ambulated EOB <> bathroom with CGA-min A and FWW. Pt required cues for L foot placement and navigation. Pt completed toilet transfer with min A and cues for safety. Pt completed g/h tasks alternating sit/standing with mod A. Pt tolerated activity well.      Occupational Therapy Discharge Summary     Reason for therapy discharge:    Discharged to acute rehabilitation facility.     Progress towards therapy goal(s). See goals on Care Plan in [facility] health record for goal details.  Goals partially met.  Barriers to achieving goals:   discharge from facility.     Therapy recommendation(s):    Continued therapy is recommended.  Rationale/Recommendations:  Rec continued OT to address cognitive impairments, generalized weakness (L side weaker than R), balance deficits, and decreased activity tolerance. Pt currently min A for bed mobility, mod A for LB dressing/bathing, min A for UB dressing/bathing, and min A for functional transfers. .

## 2017-05-12 NOTE — PHARMACY-ANTICOAGULATION SERVICE
Clinical Pharmacy - Warfarin Dosing Consult     Pharmacy has been consulted to manage this patient s warfarin therapy.  Indication: DVT/ PE Treatment  Therapy Goal: INR 2-3  Warfarin Prior to Admission: No (new since 5/6)  Significant drug interactions: PLAVIX AND ATROVASTATIN   Recent documented change in oral intake/nutrition: Unknown    INR   Date Value Ref Range Status   05/12/2017 2.79 (H) 0.86 - 1.14 Final   05/11/2017 2.48 (H) 0.86 - 1.14 Final       Recommend warfarin 0.5 mg today.  Pharmacy will monitor Bong V Henao daily and order warfarin doses to achieve specified goal.      Please contact pharmacy as soon as possible if the warfarin needs to be held for a procedure or if the warfarin goals change.

## 2017-05-12 NOTE — IP AVS SNAPSHOT
MRN:[id number]                      After Visit Summary   5/12/2017    Luke Henao    MRN: [id number]           Thank you!     Thank you for choosing [facility] for your care. Our goal is always to provide you with excellent care. Hearing back from our patients is one way we can continue to improve our services. Please take a few minutes to complete the written survey that you may receive in the mail after you visit with us. Thank you!        Patient Information     Date Of Birth          1965        About your hospital stay     You were admitted on:  May 12, 2017 You last received care in the:   [facility]    You were discharged on:  May 26, 2017        Reason for your hospital stay       Admitted to acute rehab after extended hospital stay and severe heart attack and mild stroke. Pleased with functional improvements and able to discharge home.                  Who to Call     For medical emergencies, please call 911.  For non-urgent questions about your medical care, please call your primary care provider or clinic, None          Attending Provider     Provider Specialty    Flo Chacon MD Physical Medicine and Rehab       Primary Care Provider    Physician No Ref-Primary       No address on file        After Care Instructions     Activity       Your activity upon discharge: OK to walk independently with cane for support. Supervision with medications / finances and complex decisions until cognition better resolved. No driving at this time. Outpatient occupational therapy may assess with you when that might be possible to resume.            Bladder Program       Attempt to urinate on your own but if unable to go then self catheterize as instructed. Would not go longer than about 6 hours between emptying - if near that then catheterize. Sometimes voiding more often helps keep the bladder more empty. Follow up with urology as outpatient about your emptying and possible enlarged prostate.  "           Diet       Follow this diet upon discharge: regular consistency, moderate consistent carbohydrate diabetic. Low salt - max 3 Gram Sodium per day.  Fluid restriction 1800 ML FLUID            Discharge Instructions       Please take warfarin as follows:  Take 3mg on 5/26, 3mg on 5/27, 1.5mg (half tab) on 5/28, and 3mg on 5/29. Have INR checked on 5/30.            Wound care and dressings       Instructions to care for your wound at home: Coccyx/buttock: Clean wound with Microklenz, Apply a small amount of Triad Barrier directly to wound, Cover with Mepilex dressing change daily until healed.  Right upper chest: daily remove packing, spray with Microklenz, pat dry. Gently repack with 1/4\" wide Nugauze then cover with dry dressing.                  Your next 10 appointments already scheduled     May 30, 2017  7:45 AM CDT   Neuro Eval with Mariangel Hayward, SLP   [facility] ([facility])    [address]  [address]  [facility] [address]   [phone]            May 30, 2017  9:00 AM CDT   Neuro Eval with Viki Cole OT   [facility] Occupational Therapy ([facility])    [address]  [address]  [facility] [address]   [phone]            May 31, 2017  8:00 AM CDT   Cardiac Evaluation with  CARDIAC REHAB 4   [facility] ([facility])    [address]  [facility] [address]   [phone]            May 31, 2017 10:45 AM CDT   New Patient Visit with Cherelle Meyers PA-C   [facility] ([facility])    [address]  [address]  [facility] [address] [phone]            May 31, 2017  1:45 PM CDT   Anticoagulation Visit with [facility]    [address] [address]   [phone]            Jun 06, 2017  9:45 AM CDT   Office " Visit with Glenn Brown MD   [facility] ([facility])    [address] [id number] [phone]           Bring a current list of meds and any records pertaining to this visit.  For Physicals, please bring immunization records and any forms needing to be filled out.  Please arrive 10 minutes early to complete paperwork.            Jun 08, 2017  8:30 AM CDT   Lab with  LAB    [facility] ([facility])    [address] [place] [id number] [phone]            Jun 08, 2017  9:00 AM CDT   Ech Complete with UCECHCR2    [facility] ([facility])    [address] [place] [id number] [phone]           1.  Please bring or wear a comfortable two-piece outfit. 2.  You may eat, drink and take your normal medicines. 3.  For any questions that cannot be answered, please contact the ordering physician            Jun 08, 2017 10:00 AM CDT   (Arrive by 9:45 AM)   Return Mitral Valve with Ron Cortez MD   [facility] ([facility])    [address] [place] [id number] [phone]            Jun 12, 2017  9:00 AM CDT   Neuro Eval with Lilian Mock, PT   [facility] ([facility])    [address]  [address]  [facility] [id number] [phone]              Additional Services     Cardiac Rehab Referral       Your provider has referred you to: [facility] [phone]   [email or web address]            INR CLINIC REFERRAL       [facility]    Please be aware that coverage of these services is subject to the terms and limitations of your health insurance plan.  Call member services at your health plan with any benefit or coverage  questions.      Indication for Anticoagulation: DVT (first time) INR 2-3  Expected duration of therapy: 3-6 months            Occupational Therapy Referral       *This therapy referral will be filtered to a centralized scheduling office at [facility] and the patient will receive a call to schedule an appointment at a [facility] location most convenient for them. *     [facility] provides Physical Therapy evaluation and treatment and many specialty services across the [facility] system.  If requesting a specialty program, please choose from the list below.    If you have not heard from the scheduling office within 2 business days, please call [phone] for all locations, with the exception of [facility], please call [phone].  Treatment: Evaluation & Treatment  Special Programs: continue with outpatient PT, OT, SLP and cardiac rehab after severe MI, cardiogenic shock and stroke. Initial in inpatient rehab, now ready for outpatient.     Please be aware that coverage of these services is subject to the terms and limitations of your health insurance plan.  Call member services at your health plan with any benefit or coverage questions.            Physical Therapy Referral       *This therapy referral will be filtered to a centralized scheduling office at [facility] and the patient will receive a call to schedule an appointment at a [facility] location most convenient for them. *     [facility] provides Physical Therapy evaluation and treatment and many specialty services across the [facility] system.  If requesting a specialty program, please choose from the list below.    If you have not heard from the scheduling office within 2 business days, please call [phone] for all locations, with the exception of [place], please call [phone].  Treatment: Evaluation & Treatment  Special Programs: continue with outpatient PT, OT, SLP and  cardiac rehab after severe MI, cardiogenic shock and stroke. Initial in inpatient rehab, now ready for outpatient.     Please be aware that coverage of these services is subject to the terms and limitations of your health insurance plan.  Call member services at your health plan with any benefit or coverage questions.            Speech Therapy Referral       *This therapy referral will be filtered to a centralized scheduling office at [facility] and the patient will receive a call to schedule an appointment at a [facility] location most convenient for them. *     [facility] provides Physical Therapy evaluation and treatment and many specialty services across the [facility] system.  If requesting a specialty program, please choose from the list below.    If you have not heard from the scheduling office within 2 business days, please call [phone] for all locations, with the exception of [place], please call [phone].  Treatment: Evaluation & Treatment  Special Programs: continue with outpatient PT, OT, SLP and cardiac rehab after severe MI, cardiogenic shock and stroke. Initial in inpatient rehab, now ready for outpatient.     Please be aware that coverage of these services is subject to the terms and limitations of your health insurance plan.  Call member services at your health plan with any benefit or coverage questions.                  Further instructions from your care team       Recommended Follow-Up:    -- Establish Primary care [facility] within 1-2 weeks.  You are scheduled to see Dr. Hernández on Tuesday, June 6th at 10:00.    Address  [facility]                          [address] 2nd Floor                          [place], [place]  Phone   [phone]    -- INR draw  You are scheduled on Wednesday, May 31st at 2:00.    Address  [facility]                          [address]                           [place], [place]  Phone   [phone]    -- Follow-up with cardiology as scheduled on 6/8.    -- Follow-up with urology to follow up for urine retention and assessment of prostate.  You are scheduled to see Cherelle Meyers PA-C on Wednesday, May 31st at 11:00.    Address  [facility]                          [address]                          [place] [address]  Phone   [phone]    You can follow up with Dr. Flo Chacon on an as needed basis if any functional or rehab issues persist.    Address  Dr. Flo Chacon                           of [facility] [place]                          Physical Medicine and Rehabilitation Clinic                          3rd Floor    [address] [place] [address]    Phone   [place] [phone]      For your pressure injury on your sacral area:  Use your  Geomat cushion  when sitting in a chair, wheel chair, or car until this wound is healed. You should position yourself, while in bed, from side to side.  Change your  Mepilex 4x4 dressing to this area every 3 days, more often if soiled or wet from the shower. If it does not heal in the next 4 weeks, you should make an appointment to see Teri Sepulveda at the [facility] [phone] to make an appointment. Otherwise [facility] has a [facility] [place] and you could see someone there [phone]. There is a PA, Keyana Menendez, there who would be happy to follow him      Warfarin Instruction     You have started taking a medicine called warfarin. This is a blood-thinning medicine (anticoagulant). It helps prevent and treat blood clots.      Before leaving the hospital, make sure you know how much to take and how long to take it.      You will need regular blood tests to make sure your blood is clotting safely. It is very important to see your doctor for regular blood tests.    Talk to your doctor before taking any new medicine (this includes over-the-counter drugs and herbal products).  "Many medicines can interact with warfarin. This may cause more bleeding or too much clotting.     Eating a lot of vitamin K--found in green, leafy vegetables--can change the way warfarin works in your body. Do NOT avoid these foods. Instead, try to eat the same amount each day.     Bleeding is the most common side-effect of warfarin. You may notice bleeding gums, a bloody nose, bruises and bleeding longer when you cut yourself. See a doctor at once if:   o You cough up blood  o You find blood in your stool (poop)  o You have a deep cut, or a cut that bleeds longer than 10 minutes   o You have a bad cut, hard fall, accident or hit your head (go to urgent care or the emergency room).    For women who can get pregnant: This medicine can harm an unborn baby. Be very careful not to get pregnant while taking this medicine. If you think you might be pregnant, call your doctor right away.    For more information, read \"Guide to Warfarin Therapy,  the booklet you received in the hospital.        Pending Results     No orders found from 5/10/2017 to 5/13/2017.            Statement of Approval     Ordered          05/26/17 1208  I have reviewed and agree with all the recommendations and orders detailed in this document.  EFFECTIVE NOW     Approved and electronically signed by:  Flo Chacon MD             Admission Information     Date & Time Department Dept. Phone    5/12/2017  ACUTE REHAB [phone]      Your Vitals Were     Blood Pressure Pulse Temperature Respirations Height Weight    104/72 107 97.7  F (36.5  C) (Oral) 16 1.524 m (5') 53.8 kg (118 lb 8 oz)    Pulse Oximetry BMI (Body Mass Index)                97% 23.14 kg/m2          MyChart Information     [employer] lets you send messages to your doctor, view your test results, renew your prescriptions, schedule appointments and more. To sign up, go to [email or web address] . Click on \"Log in\" on the left side of the screen, which will take you to the " "Welcome page. Then click on \"Sign up Now\" on the right side of the page.     You will be asked to enter the access code listed below, as well as some personal information. Please follow the directions to create your username and password.     Your access code is: [email or web address]  Expires: 2017 12:15 PM     Your access code will  in 90 days. If you need help or a new code, please call your [place] clinic or [phone].        Care EveryWhere ID     This is your Care EveryWhere ID. This could be used by other organizations to access your [place] medical records  [id number]           Review of your medicines      START taking        Dose / Directions    alcohol swab prep pads   Used for:  Diabetes mellitus type 2, insulin dependent (H)        Use to swab area of injection/mary alice as directed 3 x per day   Quantity:  100 each   Refills:  3       ascorbic acid 500 MG Tabs   Used for:  Coronary artery disease involving native coronary artery of native heart without angina pectoris        Dose:  500 mg   Start taking on:  2017   Take 1 tablet (500 mg) by mouth daily   Quantity:  2 tablet   Refills:  0       beta carotene 42542 UNIT capsule   Used for:  Ischemic cardiomyopathy        Dose:  63722 Units   Start taking on:  2017   Take 1 capsule (25,000 Units) by mouth daily   Quantity:  2 capsule   Refills:  0       blood glucose monitoring test strip   Commonly known as:  no brand specified   Used for:  Diabetes mellitus type 2, insulin dependent (H)        Use to test blood sugar 3 times daily or as directed.   Quantity:  100 strip   Refills:  6       cetirizine 10 MG tablet   Commonly known as:  zyrTEC   Used for:  Seasonal allergic rhinitis, unspecified allergic rhinitis trigger        Dose:  10 mg   Take 1 tablet (10 mg) by mouth daily   Quantity:  60 tablet   Refills:  0       finasteride 5 MG tablet   Commonly known as:  PROSCAR   Used for:  Benign prostatic hyperplasia with lower urinary tract " symptoms, unspecified morphology        Dose:  5 mg   Take 1 tablet (5 mg) by mouth daily   Quantity:  60 tablet   Refills:  0       insulin pen needle 32G X 4 MM   Commonly known as:  BD KYLEE U/F   Used for:  Diabetes mellitus type 2, insulin dependent (H)        Use 3 daily or as directed.   Quantity:  100 each   Refills:  prn       potassium chloride SA 20 MEQ CR tablet   Commonly known as:  K-DUR/KLOR-CON M   Used for:  Ischemic cardiomyopathy        Dose:  20 mEq   Take 1 tablet (20 mEq) by mouth 2 times daily   Quantity:  120 tablet   Refills:  0       senna-docusate 8.6-50 MG per tablet   Commonly known as:  SENOKOT-S;PERICOLACE   Used for:  Constipation, unspecified constipation type        Dose:  1 tablet   Take 1 tablet by mouth 2 times daily Hold for loose stools   Quantity:  60 tablet   Refills:  0       Sharps Container Misc   Used for:  Diabetes mellitus type 2, insulin dependent (H)        Dose:  1 each   1 each every 30 days   Quantity:  1 each   Refills:  0       tamsulosin 0.4 MG capsule   Commonly known as:  FLOMAX   Used for:  Ischemic cardiomyopathy        Dose:  0.4 mg   Take 1 capsule (0.4 mg) by mouth daily   Quantity:  60 capsule   Refills:  0       zinc sulfate 220 (50 ZN) MG capsule   Commonly known as:  ZINCATE   Used for:  Ischemic cardiomyopathy        Dose:  220 mg   Take 1 capsule (220 mg) by mouth daily   Quantity:  2 capsule   Refills:  0         CONTINUE these medicines which may have CHANGED, or have new prescriptions. If we are uncertain of the size of tablets/capsules you have at home, strength may be listed as something that might have changed.        Dose / Directions    bumetanide 0.5 MG tablet   Commonly known as:  BUMEX   This may have changed:    - medication strength  - how much to take   Used for:  Ischemic cardiomyopathy        Dose:  0.5 mg   Take 1 tablet (0.5 mg) by mouth 2 times daily   Quantity:  120 tablet   Refills:  0       lisinopril 2.5 MG tablet   Commonly  known as:  PRINIVIL/Zestril   This may have changed:  how much to take   Used for:  Coronary artery disease involving native coronary artery of native heart without angina pectoris        Dose:  2.5 mg   Take 1 tablet (2.5 mg) by mouth 2 times daily   Quantity:  120 tablet   Refills:  0         CONTINUE these medicines which have NOT CHANGED        Dose / Directions    acetaminophen 325 MG tablet   Commonly known as:  TYLENOL   Used for:  Coronary artery disease involving native coronary artery of native heart without angina pectoris        Dose:  650 mg   Take 2 tablets (650 mg) by mouth every 4 hours as needed for mild pain or fever   Quantity:  100 tablet   Refills:  0       atorvastatin 40 MG tablet   Commonly known as:  LIPITOR   Used for:  Coronary artery disease involving native coronary artery of native heart without angina pectoris, Cardiogenic shock (H)        Dose:  40 mg   1 tablet (40 mg) by Oral or Feeding Tube route daily   Quantity:  60 tablet   Refills:  0       clopidogrel 75 MG tablet   Commonly known as:  PLAVIX   Used for:  ST elevation myocardial infarction (STEMI), unspecified artery (H)        Dose:  75 mg   Take 1 tablet (75 mg) by mouth daily   Quantity:  60 tablet   Refills:  0       digoxin 125 MCG tablet   Commonly known as:  LANOXIN   Used for:  Coronary artery disease involving native coronary artery of native heart without angina pectoris, Cardiogenic shock (H)        Dose:  125 mcg   Take 1 tablet (125 mcg) by mouth daily   Quantity:  60 tablet   Refills:  0       ELOCON 0.1 % cream   Used for:  Bug bites   Generic drug:  mometasone        apply to bug bites qd-bid prn   Quantity:  30g   Refills:  0       insulin glargine 100 UNIT/ML injection   Commonly known as:  LANTUS   Used for:  Diabetes mellitus type 2, insulin dependent (H)        Dose:  25 Units   Inject 25 Units Subcutaneous every morning (before breakfast)   Quantity:  9 mL   Refills:  0       pantoprazole 40 MG EC tablet    Commonly known as:  PROTONIX   Used for:  Gastrointestinal hemorrhage associated with other gastritis        Dose:  40 mg   Take 1 tablet (40 mg) by mouth daily   Quantity:  60 tablet   Refills:  0       spironolactone 25 MG tablet   Commonly known as:  ALDACTONE   Used for:  Coronary artery disease involving native coronary artery of native heart without angina pectoris        Dose:  12.5 mg   Take 0.5 tablets (12.5 mg) by mouth daily   Quantity:  30 tablet   Refills:  0       * Warfarin Therapy Reminder   Used for:  Deep vein thrombosis (DVT) of left lower extremity, unspecified chronicity, unspecified vein (H)        Dose:  1 each   1 each continuous prn   Refills:  0       * warfarin 3 MG tablet   Commonly known as:  COUMADIN   Used for:  Deep vein thrombosis (DVT) of left lower extremity, unspecified chronicity, unspecified vein (H)        Dose:  3 mg   Take 1 tablet (3 mg) by mouth daily   Quantity:  30 tablet   Refills:  0       * Notice:  This list has 2 medication(s) that are the same as other medications prescribed for you. Read the directions carefully, and ask your doctor or other care provider to review them with you.      STOP taking     insulin aspart 100 UNIT/ML injection   Commonly known as:  NovoLOG PEN           OCUFLOX 0.3 % ophthalmic solution   Generic drug:  ofloxacin                Where to get your medicines      These medications were sent to [facility] [place], [place] - [address]  [address] [address]     Phone:  [phone]     acetaminophen 325 MG tablet    alcohol swab prep pads    ascorbic acid 500 MG Tabs    atorvastatin 40 MG tablet    beta carotene 42767 UNIT capsule    blood glucose monitoring test strip    bumetanide 0.5 MG tablet    cetirizine 10 MG tablet    clopidogrel 75 MG tablet    digoxin 125 MCG tablet    finasteride 5 MG tablet    insulin glargine 100 UNIT/ML injection    insulin pen needle 32G X 4 MM    lisinopril 2.5 MG  tablet    pantoprazole 40 MG EC tablet    potassium chloride SA 20 MEQ CR tablet    senna-docusate 8.6-50 MG per tablet    Sharps Container Misc    spironolactone 25 MG tablet    tamsulosin 0.4 MG capsule    warfarin 3 MG tablet    zinc sulfate 220 (50 ZN) MG capsule                Protect others around you: Learn how to safely use, store and throw away your medicines at www.disposemymeds.org.             Medication List: This is a list of all your medications and when to take them. Check marks below indicate your daily home schedule. Keep this list as a reference.      Medications           Morning Afternoon Evening Bedtime As Needed    acetaminophen 325 MG tablet   Commonly known as:  TYLENOL   Take 2 tablets (650 mg) by mouth every 4 hours as needed for mild pain or fever   Last time this was given:  650 mg on 5/25/2017  9:13 PM                                alcohol swab prep pads   Use to swab area of injection/mary alice as directed 3 x per day                                ascorbic acid 500 MG Tabs   Take 1 tablet (500 mg) by mouth daily   Start taking on:  5/27/2017   Last time this was given:  500 mg on 5/26/2017  7:52 AM                                atorvastatin 40 MG tablet   Commonly known as:  LIPITOR   1 tablet (40 mg) by Oral or Feeding Tube route daily   Last time this was given:  40 mg on 5/25/2017  7:23 PM                                beta carotene 50463 UNIT capsule   Take 1 capsule (25,000 Units) by mouth daily   Start taking on:  5/27/2017   Last time this was given:  25,000 Units on 5/26/2017  7:52 AM                                blood glucose monitoring test strip   Commonly known as:  no brand specified   Use to test blood sugar 3 times daily or as directed.                                bumetanide 0.5 MG tablet   Commonly known as:  BUMEX   Take 1 tablet (0.5 mg) by mouth 2 times daily   Last time this was given:  0.5 mg on 5/26/2017  7:53 AM                                cetirizine 10  MG tablet   Commonly known as:  zyrTEC   Take 1 tablet (10 mg) by mouth daily   Last time this was given:  10 mg on 5/26/2017  7:54 AM                                clopidogrel 75 MG tablet   Commonly known as:  PLAVIX   Take 1 tablet (75 mg) by mouth daily   Last time this was given:  75 mg on 5/26/2017  8:06 AM                                digoxin 125 MCG tablet   Commonly known as:  LANOXIN   Take 1 tablet (125 mcg) by mouth daily   Last time this was given:  125 mcg on 5/26/2017  7:52 AM                                ELOCON 0.1 % cream   apply to bug bites qd-bid prn   Generic drug:  mometasone                                finasteride 5 MG tablet   Commonly known as:  PROSCAR   Take 1 tablet (5 mg) by mouth daily   Last time this was given:  5 mg on 5/26/2017  7:52 AM                                insulin glargine 100 UNIT/ML injection   Commonly known as:  LANTUS   Inject 25 Units Subcutaneous every morning (before breakfast)   Last time this was given:  25 Units on 5/26/2017  7:52 AM                                insulin pen needle 32G X 4 MM   Commonly known as:  BD KYLEE U/F   Use 3 daily or as directed.                                lisinopril 2.5 MG tablet   Commonly known as:  PRINIVIL/Zestril   Take 1 tablet (2.5 mg) by mouth 2 times daily   Last time this was given:  2.5 mg on 5/25/2017  7:23 PM                                pantoprazole 40 MG EC tablet   Commonly known as:  PROTONIX   Take 1 tablet (40 mg) by mouth daily   Last time this was given:  40 mg on 5/26/2017  6:28 AM                                potassium chloride SA 20 MEQ CR tablet   Commonly known as:  K-DUR/KLOR-CON M   Take 1 tablet (20 mEq) by mouth 2 times daily   Last time this was given:  20 mEq on 5/26/2017  7:53 AM                                senna-docusate 8.6-50 MG per tablet   Commonly known as:  SENOKOT-S;PERICOLACE   Take 1 tablet by mouth 2 times daily Hold for loose stools   Last time this was given:  2 tablets  on 5/26/2017  7:53 AM                                Sharps Container Misc   1 each every 30 days                                spironolactone 25 MG tablet   Commonly known as:  ALDACTONE   Take 0.5 tablets (12.5 mg) by mouth daily   Last time this was given:  12.5 mg on 5/26/2017  7:52 AM                                tamsulosin 0.4 MG capsule   Commonly known as:  FLOMAX   Take 1 capsule (0.4 mg) by mouth daily   Last time this was given:  0.4 mg on 5/26/2017  7:53 AM                                * Warfarin Therapy Reminder   1 each continuous prn                                * warfarin 3 MG tablet   Commonly known as:  COUMADIN   Take 1 tablet (3 mg) by mouth daily   Last time this was given:  4 mg on 5/25/2017  5:13 PM                                zinc sulfate 220 (50 ZN) MG capsule   Commonly known as:  ZINCATE   Take 1 capsule (220 mg) by mouth daily   Last time this was given:  220 mg on 5/26/2017  7:53 AM                                * Notice:  This list has 2 medication(s) that are the same as other medications prescribed for you. Read the directions carefully, and ask your doctor or other care provider to review them with you.

## 2017-05-12 NOTE — PLAN OF CARE
Problem: Goal Outcome Summary  Goal: Goal Outcome Summary  1) pt will be hemodynamically stable  2) pt will tolerate weaning of IABP  3) pain will be adequately controlled     Outcome: Adequate for Discharge Date Met:  05/12/17  DISCHARGE                         No discharge date for patient encounter.  ----------------------------------------------------------------------------  [facility]  Via: Wheel chair via [facility]  Accompanied by: Family ( wife and daughter)  Discharge Instructions: diet, activity, medications, follow up appointments, when to call the MD, aftercare instructions, and what to watchout for (i.e. s/s of infection, increasing SOB, palpitations, chest pain,)  Prescriptions: To be filled by       pharmacy per pt's request; medication list reviewed & sent with pt  Follow Up Appointments: arranged; information given  Belongings: All sent with pt  IV: out  Telemetry: off  Pt exhibits understanding of above discharge instructions; all questions answered. PICC line removed as per orders. Discharge package reviewed with family. All belonging accounted for and taken by the  family.     Discharge Paperwork: Signed, copied, and sent home with patient.

## 2017-05-12 NOTE — CONSULTS
Internal Medicine Consult - Initial Visit       Luke Henao MRN# [id number]   YOB: 1965 Age: 51 year old   Date of Admission: 5/12/2017  PCP: No Ref-Primary, Physician    Referring Provider: Flo Chacon MD  Reason for Consult:  Recent STEMI and T2DM         Assessment and Recommendations:     Coronary artery disease, Ischemic cardiomyopathy, recurrent cardiogenic shock (resolved):  Initially presented from [facility] after angio showing underlying 3v coronary artery disease that did not have intervention. Noted at the time to have high SVR and low CI, complicated by ischemic mitral regurgitation. He developed resultant ischemic cardiomyopathy secondary to inferior wall STEMI and had 7 FEMI to LAD, LCx, RCA on 3/27. Change in EKG seen on 4/1 and repeat angio was performed showing LCx was  and was noted to be re-occluded (previously opened on 3/27). Despite PCI the patient continued to have refractory cardiogenic shock and inability to wean from the IABP. MitraClip placed on 5/1 for ischemic mitral reguritation. The IABP was slowly weaned post procedure. Echo 5/1 with inferior hypokinesis and est EF 30-35%. Discontinued IABP on 5/5. Pt's BPs stable since and currently denies having any cardiovascular complaints.   - PT/OT per PM&R  - Continue Lisinopril 7.5 mg BID  - Continue Digoxin 125 mcg daily  - Continue Bumex 1 mg BID   - Continue Spironolactone 12.5 mg daily  - Continue FR of 1.5 L per day.   - Continue Plavix x 1 year  - Beta blocker being held in the setting of recent heart failure decompensation and will be added back as an outpatient.  - Daily wts / I&Os    Mitral regurg s/p MitraClip:  Developed ischemic mitral regurgitation as a result of his STEMI. Due to refractory cardiogenic shock and inability to wean from IABP a MitraClip procedure was performed.   - Needs lifelong antiplatelet with plan to continue Plavix for 1 year for FEMI then change to ASA.    H/o CVA: CVA developed left  hand weakness and pain on 4/29 and a stroke code was called. CTA head/neck showed no hemorrhage but new areas of low attenuation in b/l hemispheres. Neuro consulted.   - Continuing Plavix and statin for prevention.   - PT/OT per PM&R     Non-severe malnutrition: Within in the context of acute illness. Started on TFs in hospital of which he is tolerating.  - Nutrition consulted and appreciate following.   - Continue TF via NDT  - Continue low Na diet  - Recommend calorie counts.      Sinus tachycardia: Persistent during hospitalization. No current evidence of infection, hypovolemia. Likely from recovering cardiac function.   - Will be started on a betablocker as an outpatient as cardiac function/heart failure continues to improve.       Type 2 diabetes mellitus: HA1C 7.5 on admission. Initially on insulin gtt and transitioned to Lantus and Novolog sliding scale. BSs over the past week have been under relatively fair control in ~ mid 100s  - For now continue with Lantus 25 units qam and Novolog medium correction scale  - Monitor sugars TID before meals, qhs and 0200.       Hx of Lower GIB: Had several maroon BMs since 4/11, Hb overall dropped by a 1.5 grams or so, heparin infusion was held and GI consulted. Colonoscopy 4/17 showed a rectal ulcer thought secondary to retal tube but no intervenable lesions. Hgb remains stable.   - Continue on Protonix 40 PO qD.      Hx of acute DVT:  During hospitalization pt developed a non-occlusive clot in left IJ and left common femoral vein. Started on Coumadin with bridging heparin gtt low intensity. DVT considered provoked in the setting of prolonged hospitalization and critical illness and therefore warfarin course should be ~3-6 months. INR goal 2-3, most recent WNL.   - Pharmacy consulted to dose daily warfarin based on serial INRs. Appreciate assistance.   - F/u in warfarin clinic after discharge     Urinary retention: Patient require dawkins catheter for much of  "hospitalization. Discontinued several days prior to discharge and since has been having some issues with urinary retention. No history of BPH. Likely secondary to prolonged dawkins. - Continue to monitor with bladder scans and intermittent straight cath.       Insomnia:  Difficulty sleeping intermittently throughout admission. Improved.   - Melatonin qHS and Seroquel 12.5mg qHS.    Hypoxic respiratory failure, resolved: Initially presented intubated after STEMI. Extubated on 4/3. Re-intubated in the setting of worsening hypoxia on 4/25, extubated 4/28. Thought to be aspiration pneumonia/concomittant HCAP. Had already been treated with broad spectrum antibiotic starting 4/19. Completed 2 week course. Continued to improve and on O2 for comfort upon transfer. Currently denies SOB and O2 sats on RA WNL.    Urinary tract infection, resolved: UA 4/15 with cloudy urine, 50 WBCs, many bacteria, no nitrates. UCx grew E coli - treated with CFTX (sensitive). Fever on 4/20 and single spike 100.2 overnight with unremarkable blood cultures. Completed Vanc and pip/tazo treatment course on 5/1.     Recommendations reviewed with attending physician Dr. Burks and communicated to primary team via this note.     .Fabian Amanda PA-C  Internal Medicine Hospitalist & Staff [facility]  [phone]      Reason for Consult:   Recent STEMI and T2DM         History of Present Illness:   History is obtained from the patient and medical record.     Brief Hospital Course:  From excellent discharge summary dated 5/12 by Dr. Camilo: \"51 year old man presented with cardiogenic shock from inferior STEMI s/p RCA aspiration thrombectomy and POBA on 3/26 at [facility] s/p IABP and initiation of Dopamine, also during procedure, complete heart block s/p temp pacer, emesis/hematmesis and altered mental status s/p intubation transferred here for consideration of mechanical support on 3/27. Had PCI with FEMI to RCA, LAD and LCx (on ASA " "and plavix) started on epinephrine in addition to dopamine. Post procedure developed distributive shock picture from infection (aspiration pneumonia? Sputum cx growing staph aureus, on vanco and zosyn) vs post-MI SIRS response. Later again developed cardiogenic shock requiring replacement of the IABP. MitraClip placed to mitral regurgitation. Slowly weaned from IABP and it was removed 5/6/17. Medications uptitrated over the next several days without issue. Discharging to Acute rehab.\"    Internal Medicine service was asked to see patient for diabetes management and recent STEMI. At this time, Mr. Henao denies acute physical concerns including fever, chills, chest pain, SOB, nausea, abd pain, bowel and bladder concerns.           Review of Systems:   A 10 point ROS was performed and negative unless otherwise noted in HPI.           Past Medical History:   Reviewed and updated in Epic.  No past medical history on file.          Past Surgical History:   Reviewed and updated in Epic.  Past Surgical History:   Procedure Laterality Date     COLONOSCOPY N/A 4/17/2017    Procedure: COLONOSCOPY;  Surgeon: Rashaad Bundy MD;  Location:  GI     INSERT INTRAAORTIC BALLOON PUMP Right 4/19/2017    Procedure: INSERT INTRAAORTIC BALLOON PUMP;  Right Subclavian Intra Aortic Balloon Pump Insertion using Maquet 40cc Ballon Catheter, Implentation of 8mm Gelweave Woven Vascular Prosthesis, Removal of Left Femoral Ballon Pump Catheter, Flouroscopy;  Surgeon: Keshav Leung MD;  Location:  OR     PERCUTANEOUS MITRAL VALVE REPAIR N/A 5/1/2017    Procedure: PERCUTANEOUS MITRAL VALVE REPAIR ANESTHESIA;  Mitraclip Procedure Possible Cardiopulmonary Bypass ;  Surgeon: Ron Cortez MD;  Location:  OR     SUBCLAVIAN AORTIC VALVE IMPLANT N/A 5/8/2017    Procedure: SUBCLAVIAN AORTIC VALVE IMPLANT;  Right Subclavian Graft Removal ;  Surgeon: Keshav Leung MD;  Location:  OR             Social History: "   Reviewed and updated in [email or web address].  Social History     Social History     Marital status:      Spouse name: N/A     Number of children: N/A     Years of education: N/A     Occupational History     Not on file.     Social History Main Topics     Smoking status: Current Every Day Smoker     Packs/day: 0.50     Types: Cigarettes     Smokeless tobacco: Not on file     Alcohol use Not on file     Drug use: Not on file     Sexual activity: Not on file     Other Topics Concern     Not on file     Social History Narrative              Family History:   Reviewed and updated in Epic.  No family history on file.          Allergies:   No Known Allergies          Medications:     Current Facility-Administered Medications   Medication     acetaminophen (TYLENOL) tablet 650 mg     atorvastatin (LIPITOR) tablet 40 mg     bumetanide (BUMEX) tablet 1 mg     [START ON 5/13/2017] clopidogrel (PLAVIX) tablet 75 mg     [START ON 5/13/2017] digoxin (LANOXIN) tablet 125 mcg     [START ON 5/13/2017] insulin glargine (LANTUS) injection 25 Units     lisinopril (PRINIVIL/Zestril) tablet 7.5 mg     melatonin tablet 3 mg     pantoprazole (PROTONIX) EC tablet 40 mg     QUEtiapine (SEROquel) half-tab 12.5 mg     spironolactone (ALDACTONE) half-tab 12.5 mg     glucose 40 % gel 15-30 g    Or     dextrose 50 % injection 25-50 mL    Or     glucagon injection 1 mg     insulin aspart (NovoLOG) inj (RAPID ACTING)     insulin aspart (NovoLOG) inj (RAPID ACTING)     Warfarin Therapy Reminder (Check START DATE - warfarin may be starting in the FUTURE)     Patient is already receiving anticoagulation with heparin, enoxaparin (LOVENOX), warfarin (COUMADIN)  or other anticoagulant medication            Physical Exam:   Blood pressure 103/69, pulse 110, temperature 97.3  F (36.3  C), temperature source Oral, resp. rate 16, height 1.524 m (5'), weight 56.1 kg (123 lb 9.6 oz), SpO2 97 %.  Body mass index is 24.14 kg/(m^2).  GEN: Pleasant man lying in bed in  NAD  HEENT: NCAT; PERRL; sclerae non-icteric; MMM  LUNGS: CTAB  CV: RRR  ABD: +BSs; SNTND  EXT: No BLE edema  SKIN: No acute rashes noted on exposed areas.  NEURO: AAOx3; CNs grossly intact; No acute focal deficits noted.  .           Data:   CMP  Recent Labs  Lab 05/12/17  0651 05/11/17  1933 05/11/17  0638 05/10/17  1749 05/10/17  0623  05/09/17  0358  05/08/17  0404  05/07/17  0357  05/06/17  0341    136 135 132* 136  --  136  < > 134  < > 135  < > 136   POTASSIUM 4.6 4.0 4.1 3.9 3.9  < > 3.9  < > 3.9  < > 4.3  < > 4.2   CHLORIDE 104 99 98 94 98  --  100  < > 98  < > 99  < > 100   CO2 30 31 30 28 31  --  29  < > 30  < > 28  < > 30   ANIONGAP 4 6 7 11 6  --  6  < > 6  < > 7  < > 7   * 170* 128* 161* 158*  --  132*  < > 120*  < > 158*  < > 163*   BUN 23 26 28 25 22  --  21  < > 23  < > 23  < > 22   CR 0.74 0.81 0.86 0.79 0.84  --  0.79  < > 0.77  < > 0.79  < > 0.88   GFRESTIMATED >90Non  GFR Calc >90Non  GFR Calc >90Non  GFR Calc >90Non  GFR Calc >90Non  GFR Calc  --  >90Non  GFR Calc  < > >90Non  GFR Calc  < > >90Non  GFR Calc  < > >90Non  GFR Calc   GFRESTBLACK >90African American GFR Calc >90African American GFR Calc >90African American GFR Calc >90African American GFR Calc >90African American GFR Calc  --  >90African American GFR Calc  < > >90African American GFR Calc  < > >90African American GFR Calc  < > >90African American GFR Calc   ROB 7.9* 8.1* 8.4* 8.4* 8.4*  --  7.9*  < > 7.9*  < > 7.9*  < > 7.6*   MAG 2.3 2.2 2.0 2.0 1.8  --  2.1  < > 2.4*  < > 2.5*  < > 2.1   PHOS 3.1  --  3.6  --  4.2  --  4.3  --  4.1  --  3.7  --  2.9   PROTTOTAL  --   --   --   --   --   --   --   --  6.7*  --  6.8  --  6.7*   ALBUMIN  --   --   --   --   --   --   --   --  2.4*  --  2.3*  --  2.2*   BILITOTAL  --   --   --   --   --   --   --   --  0.3  --  0.4  --  0.4    ALKPHOS  --   --   --   --   --   --   --   --  64  --  70  --  65   AST  --   --   --   --   --   --   --   --  25  --  28  --  28   ALT  --   --   --   --   --   --   --   --  30  --  28  --  30   < > = values in this interval not displayed.         Recent Labs  Lab 05/12/17  0651 05/11/17  2241 05/11/17  1933 05/11/17  1739 05/11/17  1210 05/11/17 0638 05/10/17  2111 05/10/17  1749 05/10/17  1714 05/10/17  0828 05/10/17  0623  05/09/17  0358   *  --  170*  --   --  128*  --  161*  --   --  158*  --  132*   BGM  --  179*  --  92 151*  --  186*  --  130* 179*  --   < >  --    < > = values in this interval not displayed.      CBC  Recent Labs  Lab 05/12/17  0651 05/11/17  0638 05/10/17  0623 05/09/17  0358   WBC 8.5 8.7 7.2 7.7   RBC 3.31* 3.41* 3.19* 3.06*   HGB 9.8* 10.1* 9.3* 9.1*   HCT 32.0* 33.1* 31.0* 29.3*   MCV 97 97 97 96   MCH 29.6 29.6 29.2 29.7   MCHC 30.6* 30.5* 30.0* 31.1*   RDW 17.3* 17.6* 18.0* 19.0*    279 360 322     INR  Recent Labs  Lab 05/12/17  0651 05/11/17  0638 05/10/17  0623 05/09/17  0358   INR 2.79* 2.48* 1.81* 1.53*     Arterial Blood Gas  Recent Labs  Lab 05/09/17  1021 05/09/17  0358 05/09/17  0011 05/08/17 2034 05/08/17 1712   PH  --   --   --   --  7.43   PCO2  --   --   --   --  40   PO2  --   --   --   --  162*   HCO3  --   --   --   --  26   O2PER 21 21LPM 2LPM 2 L PM  --         Unresulted Labs Ordered in the Past 30 Days of this Admission     Date and Time Order Name Status Description    5/9/2017 0830 Surgical pathology exam In process     4/28/2017 0924 AFB Culture Non Blood Preliminary     4/27/2017 1139 AFB Culture Non Blood Preliminary     4/26/2017 1250 AFB Culture Non Blood -- BAL Site 1 Preliminary     4/26/2017 1021 Fungus Culture, non-blood - BAL Site 1 Preliminary

## 2017-05-12 NOTE — PROGRESS NOTES
Social Work Services Discharge Note      Patient Name: Luke Henao    Anticipated Discharge Date:  05/12/17    Discharge Disposition: ARU  [facility]  PH: *[phone]    Following MD:  No Ref-Primary, Physician    Pre-Admission Screening (PAS) online form has been completed.  The Level of Care (LOC) is:  Determined  Confirmation Code is:  NA  Patient/caregiver informed of referral to [facility] Line for Pre-Admission Screening for skilled nursing facility (SNF) placement and to expect a phone call post discharge from SNF.     Additional Services/Equipment Arranged:    - Transportation: [facility] at 11 am.  - Other services/equipment: [facility] to bring wheelchair for transport.     Patient / Family response to discharge plan:  Pt/daughter in agreement with the discharge plan.     Persons notified of above discharge plan:  Pt, daughter, bedside RN, Cards 2 team, ARU admissions.    Staff Discharge Instructions:  Please fax discharge orders and signed hard scripts for any controlled substances.  Please print a packet and send with patient.     CTS Handoff completed:  YES    Medicare Notice of Rights provided to the patient/family: MARLA Christiansen, APSW  6[facility] Unit   Phone: [phone]  Pager: [phone]  Unit: [phone]

## 2017-05-12 NOTE — PROGRESS NOTES
Calorie Counts    Intake Recorded For: 5/11 Kcals: 960 Protein: 54g    # Meals Recorded: 3 meals    # Supplements Recorded: 1 Ensure Clear

## 2017-05-13 LAB
GLUCOSE BLDC GLUCOMTR-MCNC: 107 MG/DL (ref 70–99)
GLUCOSE BLDC GLUCOMTR-MCNC: 170 MG/DL (ref 70–99)
GLUCOSE BLDC GLUCOMTR-MCNC: 182 MG/DL (ref 70–99)
GLUCOSE BLDC GLUCOMTR-MCNC: 199 MG/DL (ref 70–99)
INR PPP: 2.01 (ref 0.86–1.14)

## 2017-05-13 PROCEDURE — 00000146 ZZHCL STATISTIC GLUCOSE BY METER IP

## 2017-05-13 PROCEDURE — 97535 SELF CARE MNGMENT TRAINING: CPT | Mod: GO

## 2017-05-13 PROCEDURE — 36415 COLL VENOUS BLD VENIPUNCTURE: CPT | Performed by: PHYSICIAN ASSISTANT

## 2017-05-13 PROCEDURE — 12800006 ZZH R&B REHAB

## 2017-05-13 PROCEDURE — 97112 NEUROMUSCULAR REEDUCATION: CPT | Mod: GP | Performed by: PHYSICAL THERAPIST

## 2017-05-13 PROCEDURE — 40000133 ZZH STATISTIC OT WARD VISIT

## 2017-05-13 PROCEDURE — 85610 PROTHROMBIN TIME: CPT | Performed by: PHYSICIAN ASSISTANT

## 2017-05-13 PROCEDURE — 97116 GAIT TRAINING THERAPY: CPT | Mod: GP | Performed by: PHYSICAL THERAPIST

## 2017-05-13 PROCEDURE — 97532 ZZHC SP COGNITIVE SKILLS EA 15 MIN: CPT | Mod: GN | Performed by: SPEECH-LANGUAGE PATHOLOGIST

## 2017-05-13 PROCEDURE — 27210436 ZZH NUTRITION PRODUCT SEMIELEM INTERMED CAN

## 2017-05-13 PROCEDURE — 25000132 ZZH RX MED GY IP 250 OP 250 PS 637: Performed by: PHYSICAL MEDICINE & REHABILITATION

## 2017-05-13 PROCEDURE — 99232 SBSQ HOSP IP/OBS MODERATE 35: CPT | Performed by: PHYSICIAN ASSISTANT

## 2017-05-13 PROCEDURE — 97167 OT EVAL HIGH COMPLEX 60 MIN: CPT | Mod: GO

## 2017-05-13 PROCEDURE — 97530 THERAPEUTIC ACTIVITIES: CPT | Mod: GP | Performed by: PHYSICAL THERAPIST

## 2017-05-13 PROCEDURE — 40000193 ZZH STATISTIC PT WARD VISIT: Performed by: PHYSICAL THERAPIST

## 2017-05-13 PROCEDURE — 97530 THERAPEUTIC ACTIVITIES: CPT | Mod: GO

## 2017-05-13 PROCEDURE — 25000131 ZZH RX MED GY IP 250 OP 636 PS 637: Performed by: PHYSICIAN ASSISTANT

## 2017-05-13 PROCEDURE — 97532 ZZHC OT COGNITIVE SKILLS EA 15 MIN: CPT | Mod: GO

## 2017-05-13 PROCEDURE — 40000225 ZZH STATISTIC SLP WARD VISIT: Performed by: SPEECH-LANGUAGE PATHOLOGIST

## 2017-05-13 PROCEDURE — 97162 PT EVAL MOD COMPLEX 30 MIN: CPT | Mod: GP | Performed by: PHYSICAL THERAPIST

## 2017-05-13 PROCEDURE — 25000132 ZZH RX MED GY IP 250 OP 250 PS 637: Performed by: PHYSICIAN ASSISTANT

## 2017-05-13 PROCEDURE — 92523 SPEECH SOUND LANG COMPREHEN: CPT | Mod: GN | Performed by: SPEECH-LANGUAGE PATHOLOGIST

## 2017-05-13 RX ORDER — AMOXICILLIN 250 MG
2 CAPSULE ORAL 2 TIMES DAILY
Status: DISCONTINUED | OUTPATIENT
Start: 2017-05-13 | End: 2017-05-26 | Stop reason: HOSPADM

## 2017-05-13 RX ORDER — WARFARIN SODIUM 1 MG/1
2 TABLET ORAL
Status: COMPLETED | OUTPATIENT
Start: 2017-05-13 | End: 2017-05-13

## 2017-05-13 RX ORDER — LISINOPRIL 2.5 MG/1
2.5 TABLET ORAL 2 TIMES DAILY
Status: DISCONTINUED | OUTPATIENT
Start: 2017-05-13 | End: 2017-05-18

## 2017-05-13 RX ORDER — BISACODYL 10 MG
10 SUPPOSITORY, RECTAL RECTAL DAILY PRN
Status: DISCONTINUED | OUTPATIENT
Start: 2017-05-13 | End: 2017-05-25

## 2017-05-13 RX ORDER — POLYETHYLENE GLYCOL 3350 17 G/17G
17 POWDER, FOR SOLUTION ORAL DAILY PRN
Status: DISCONTINUED | OUTPATIENT
Start: 2017-05-13 | End: 2017-05-25

## 2017-05-13 RX ADMIN — Medication 12.5 MG: at 08:30

## 2017-05-13 RX ADMIN — PANTOPRAZOLE SODIUM 40 MG: 40 TABLET, DELAYED RELEASE ORAL at 08:31

## 2017-05-13 RX ADMIN — Medication 12.5 MG: at 19:57

## 2017-05-13 RX ADMIN — WARFARIN SODIUM 2 MG: 1 TABLET ORAL at 16:51

## 2017-05-13 RX ADMIN — ACETAMINOPHEN 650 MG: 325 TABLET ORAL at 00:41

## 2017-05-13 RX ADMIN — ACETAMINOPHEN 650 MG: 325 TABLET ORAL at 16:51

## 2017-05-13 RX ADMIN — ATORVASTATIN CALCIUM 40 MG: 40 TABLET, FILM COATED ORAL at 19:57

## 2017-05-13 RX ADMIN — INSULIN ASPART 1 UNITS: 100 INJECTION, SOLUTION INTRAVENOUS; SUBCUTANEOUS at 13:27

## 2017-05-13 RX ADMIN — INSULIN ASPART 2 UNITS: 100 INJECTION, SOLUTION INTRAVENOUS; SUBCUTANEOUS at 06:42

## 2017-05-13 RX ADMIN — CLOPIDOGREL BISULFATE 75 MG: 75 TABLET, FILM COATED ORAL at 08:30

## 2017-05-13 RX ADMIN — INSULIN GLARGINE 25 UNITS: 100 INJECTION, SOLUTION SUBCUTANEOUS at 06:56

## 2017-05-13 RX ADMIN — BUMETANIDE 1 MG: 1 TABLET ORAL at 08:31

## 2017-05-13 RX ADMIN — SENNOSIDES AND DOCUSATE SODIUM 2 TABLET: 8.6; 5 TABLET ORAL at 19:57

## 2017-05-13 RX ADMIN — DIGOXIN 125 MCG: 125 TABLET ORAL at 08:31

## 2017-05-13 ASSESSMENT — ACTIVITIES OF DAILY LIVING (ADL): PREVIOUS_RESPONSIBILITIES: DRIVING;WORK

## 2017-05-13 NOTE — PROGRESS NOTES
CLINICAL NUTRITION SERVICES - ASSESSMENT NOTE     Nutrition Prescription    RECOMMENDATIONS FOR MDs/PROVIDERS TO ORDER:  None at this time    Malnutrition Status:    Severe malnutrition in the context of acute on chronic illness    Recommendations already ordered by Registered Dietitian (RD):  1.  Liberalize diet to promote POs to regular.  Once POs improve and/or ready for d/c, would recommend restart of heart healthy diet  2.  Dwain cts    Future/Additional Recommendations:  1.  Once Pos >75% est needs without intolerance (~1200 kcal / 60 gm pro), would recommend d/c of TF  2.  Pending wound healing, may consider readministration of Fv wound protocol to promote healing  3.  Pending dwain cts, may consider protein supplementation as current TF regimen providing only 60% est low end needs with stage II PI    Anette Massey RD LD  Pgr [phone]     REASON FOR ASSESSMENT  Luke Henao is a/an 51 year old male assessed by the dietitian for Provider Order - Registered Dietitian to Assess and Order TF per Medical Nutrition Therapy Protocol    NUTRITION HISTORY  Has been dependent on TF since 3/29 d/t intubation and frequent interruptions to diet for procedures.  Most recent FT placed 5/1 (NDT).  Per EMR review, estimate received <75% of nutrition needs over past 2 months d/t medical status, procedures, lack of appetite.  Most recently on a regular diet + Ensure clear supplements.  TF cycled 5/10 to promote POs.    CURRENT NUTRITION ORDERS  Diet: Low Saturated Fat/2400 mg Sodium/1500 ml FR  EN:  Peptamen 1.5 @ 60 ml/hr x 12 hrs which provides 720 mL TF, 1080 kcals (20 kcals/kg), 49 g protein (0.9 g protein/kg), 554 mL H2O, 135 g CHO, and no fiber daily  Free water flushes 30 ml q 4 hrs for additional 180 ml fluid daily    Intake/Tolerance: Pt declined visit from this writer d/t fatigue.  Per EMR review tolerated TF well last night    LABS  Labs reviewed    MEDICATIONS  Medications reviewed  *Last received 10 day course of  "Tri-Vi-Sol for wound healing 4/18-28  Lantus daily  Novolog SSI with meals    ANTHROPOMETRICS  Height: 152.4 cm (5' 0\")  Most Recent Weight: 54 kg (119 lb) (standing weight)    IBW: 48.2 kg  BMI: Normal BMI  Weight History: Wt decreasing sharply since hospitalization d/t fluid and true losses.  Admission wt 3/27 62 kg.    Wt Readings from Last 10 Encounters:   05/13/17 54 kg (119 lb)   05/12/17 54.8 kg (120 lb 12.8 oz)   03/27/17 62 kg (136 lb 11 oz)   07/15/08 68.5 kg (151 lb)   ]  Dosing Weight: 54 kg    ASSESSED NUTRITION NEEDS  Estimated Energy Needs: 2603-7952 kcals/day (30 - 35 kcals/kg )  Justification: Wound healing  Estimated Protein Needs:  grams protein/day (1.5 - 2 grams of pro/kg)  Justification: Wound healing  Estimated Fluid Needs: 1800 mL/day (1 mL/kcal)   Justification: Maintenance    PHYSICAL FINDINGS  See malnutrition section below.  Stage III PI on coccyx    MALNUTRITION  % Intake: < 75% for >/= 1 months (severe)  % Weight Loss: > 7.5% in 3 months (severe)  Subcutaneous Fat Loss: Unable to assess  Muscle Loss: Unable to assess  Fluid Accumulation/Edema: None noted  Malnutrition Diagnosis: Severe malnutrition in the context of acute on chronic illness    NUTRITION DIAGNOSIS  Inadequate oral intake related to reduced appetite, dislike of room service meals as evidenced by previous hugo cts meeting <75% est kcal/pro needs       INTERVENTIONS  Implementation  Nutrition Education: Not appropriate at this time due to patient condition   Modify composition of meals/snacks - discussed diet liberalization with PA    Goals  Total avg nutritional intake to meet a minimum of 30 kcal/kg and 1.5 g PRO/kg daily (per dosing wt 54 kg).     Monitoring/Evaluation  Progress toward goals will be monitored and evaluated per protocol.  "

## 2017-05-13 NOTE — PLAN OF CARE
Problem: Goal/Outcome  Goal: Goal Outcome Summary  Postural Assessment Scale for Stroke     Maintaining a posture  1. Sitting without support:3  2. Standing with support:2  3. Standing without support:0  4. Standing on nonparetic le  5. Standing on paretic le     Changing posture  6. Supine to affected side lateral:3  7. Supine to nonaffected side lateral:2  8. Supine to sitting up on the edge of the table:1  9. Sitting on the edge of the table to supine:3  10. Sitting to standing up:2  11. Standing up to sitting down:3  12. Standing, picking up a pencil from the floor:0     Total score:     19/36

## 2017-05-13 NOTE — PLAN OF CARE
Problem: Goal/Outcome  Goal: Goal Outcome Summary  Outcome: No Change  Patient alert and oriented. Uses call light appropriately. Patient is Spanish and speaks a little English, interpretor present in morning and afternoon. TF running @ 60 ml/hr from 6pm - 6 am. Off for this shift. Complained of coccyx pain, changed mepilex to coccyx. Patient turned and repositioned in bed q 2 hrs and PRN. Patient transfer with assist of 1 staff, hold on to IV pole when ambulating, didn't like to use BSC- no BM yet so far. Chavis intact and patent and draining clear yellow urine. R chest dressing CDI. Patient denies CP, SOB, dizziness or N/V noted. Bed alarm on @ all times for safety, has history of fall in the past. Pt BG this morning was 199 and 170 at 130 - of note pt had eaten lunch around 11 AM and did nto call for preprandial blood sugar.  Will continue to monitor patient's status.

## 2017-05-13 NOTE — PROGRESS NOTES
05/13/17 1039   Quick Adds   Type of Visit Initial Occupational Therapy Evaluation   Living Environment   Lives With spouse   Living Arrangements house   Home Accessibility stairs (2 railings present)   Number of Stairs to Enter Home 2   Number of Stairs Within Home 0   Stair Railings at Home outside, present at both sides   Transportation Available car;family or friend will provide   Living Environment Comment OT: Pt lives in single story home with spouse who works, has 4 supportive children that live in [place].    Self-Care   Dominant Hand right   Usual Activity Tolerance good   Current Activity Tolerance poor   Regular Exercise yes   Activity/Exercise Type running/jogging   Exercise Amount/Frequency daily   Equipment Currently Used at Home none   Activity/Exercise/Self-Care Comment OT: Pt reports daily exercise.    Functional Level Prior   Ambulation 0-->independent   Transferring 0-->independent   Toileting 0-->independent   Bathing 0-->independent   Dressing 0-->independent   Eating 0-->independent   Communication 0-->understands/communicates without difficulty   Swallowing 0-->swallows foods/liquids without difficulty   Cognition 0 - no cognition issues reported   Fall history within last six months yes   Number of times patient has fallen within last six months 1   Which of the above functional risks had a recent onset or change? ambulation;transferring;toileting;bathing;dressing;eating;swallowing;cognition;communication/speech;fall history   Prior Functional Level Comment OT: Pt was independent in all ADLs, worked for a factory long hours, his wife completes most IADLs.        Present no   General Information   Onset of Illness/Injury or Date of Surgery - Date 03/26/17   Referring Physician Ines   Patient/Family Goals Statement To return home and to work.    Additional Occupational Profile Info/Pertinent History of Current Problem Pt is a 51 year old male admitted after an  admission to acute care on 3/26 for a myocardial infarction, thrombectomy of the right coronary artery and complications of left sided weakness due to an ischemic stroke. Pt lives in [place] with his wife in a single story home. 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 He reports that his wife completes general IADL tasks of cooking, cleaning, and paying bills. Pt reports that his wife can continue to work on these things and take a leave from work to assist as needed. He currently has difficulties with general cognitive concerns with impulsivity and following directions (will continue to assess), UE weakness and limited motion L>R, poor balance, UE coordination and general deconditioning. This is affecting his ability to complete basic ADL tasks, problem solving tasks, functional mobility, and IADL tasks.  He would benifit from skilled OT to address the above listed deficits.    Precautions/Limitations fall precautions   General Observations Pt is impulsive and has some difficulty following commands.    Cognitive Status Examination   Orientation orientation to person, place and time   Level of Consciousness alert   Able to Follow Commands mild impairment  (Pt had difficulties following commands during ambulation)   Personal Safety (Cognitive) decreased awareness, need for assist   Memory intact   Attention Distractible during evaluation   Organization/Problem Solving (Unable to assess)   Executive Function (Unable to assess)   Cognitive Comment Pt was alert and oriented throughout session, he was able to answer basic questions but difficult to understand and  not present. Portions of the [place] were completed in follow up session, see daily note.    Visual Perception   Visual Perception No deficits were identified   Visual Acuity Pt able to read clock on wall   Visual Field No deficits noted.    Visual Attention No deficits noted.    Occulomotor No deficited noted.    Sensory Examination    Sensory Comments Pt reports that sensation to touch on right is equal to left.    Pain Assessment   Patient Currently in Pain No   Integumentary/Edema   Integumentary/Edema no deficits were identifed   Posture   Posture not impaired   Range of Motion (ROM)   ROM Comment full PROM, limited AROM due to strength   Strength   Manual Muscle Testing Quick Adds MMT: Shoulder   Strength Comments Right elbow flexion: 3+/5 elbow extension 4-/5 Left elbow flexion: 4-/5 elbow extension 4-/5   MMT: Shoulder   Left Flexion (3+/5) fair plus, left   Left ABduction (3/5) fair, left   Right Flexion (3/5) fair, right   Right ABduction (3/5) fair, right   Hand Strength   Left hand  (pounds) 20 pounds   Right hand  (pounds) 35 pounds   Muscle Tone Assessment   Muscle Tone Quick Adds No deficits were identified   Coordination   Upper Extremity Coordination (Continue to assess. )   Gross Motor Coordination Pt able to participate in UE screening without difficulties, assess further as needed.    Fine Motor Coordination Difficulities with opposition to digits, extended time to complete.    Left hand, nine hole peg test (seconds) 44   Right hand, nine hole peg test (seconds) 33   Instrumental Activities of Daily Living (IADL)   Previous Responsibilities driving;work   IADL Comments Pt reports that his wife was responsible for completing many of the IADLs. He worked a lot at the factory, drove to work and would complete some basic meal prep.    Activities of Daily Living Analysis   Impairments Contributing to Impaired Activities of Daily Living balance impaired;cognition impaired;coordination impaired;motor control impaired;ROM decreased;strength decreased   General Therapy Interventions   Planned Therapy Interventions ADL retraining;IADL retraining;balance training;bed mobility training;cognition;fine motor coordination training;groups;motor coordination training;neuromuscular re-education;ROM;strengthening;stretching;transfer  training;visual perception;home program guidelines;progressive activity/exercise;risk factor education   Clinical Impression   Criteria for Skilled Therapeutic Interventions Met yes, treatment indicated   OT Diagnosis Below baseline ADL functioning.    Influenced by the following impairments Decreased cognition, UE strength and ROM, functional mobility, gross motor coordination and strength, fine motor coordination and strength.    Assessment of Occupational Performance 5 or more Performance Deficits   Identified Performance Deficits Difficulty dressing, bathing, safety awareness, IADL performance, and functional mobility.    Clinical Decision Making (Complexity) Moderate complexity   Therapy Frequency daily   Predicted Duration of Therapy Intervention (days/wks) 2 weeks   Anticipated Discharge Disposition Home with Assist   Risks and Benefits of Treatment have been explained. Yes   Patient, Family & other staff in agreement with plan of care Yes

## 2017-05-13 NOTE — PLAN OF CARE
Problem: Goal/Outcome  Goal: Goal Outcome Summary  Patient alert and oriented. Uses call light appropriately. Patient is Upper sorbian and speaks a little English. TF running @ 60 ml/hr, infusing via NDT without difficulty-patient tolerating TF well. Complained of coccyx pain-tylenol given and effective, mepilex to coccyx intact. Patient turned and repositioned in bed q 2 hrs and PRN. Patient transfer with assist of 1 staff, hold on to IV pole when ambulating, didn't like to use BSC- no BM yet so far. Chavis intact and patent and draining clear yellow urine. R chest dressing CDI. Patient no complaints of chest pain, SOB, dizziness or N/V noted. Bed alarm on @ all times for safety, has history of fall in the past. Will continue to monitor patient's status.    0650: Patient's wife came to the unit and brought food from home, had a tuna fish approximately 8 ounces and 1/2 cup of white rice. BG-199 sliding insulin aspart given, insulin glargine not available- sent request to pharmacy.

## 2017-05-13 NOTE — PLAN OF CARE
Problem: Goal/Outcome  Goal: Goal Outcome Summary  Outcome: Therapy, progress toward functional goals as expected  Pt's chart reviewed, pt evaluated and POC established.  Pt presents to ARU following extended hospital stay for cardio issues and CVA.  Pt has gross muscle weakness with increased weakness on L side.  Pt is currently able to amb short distances and uses SPT from surface<>w/c with min-mod A x1.  Pt would benefit from skilled physical therapy intervention to address aforementioned deficits and progress mobility.     D/C:  ELS is 14 days with d/c to home at mod I level with fww.     DME:  FWW

## 2017-05-13 NOTE — PROGRESS NOTES
05/13/17 1400   General Information, SLP   Type of Evaluation Speech and Language;Cognitive-Linguistic   Type of Visit Initial   Start of Care Date 05/13/17   Onset of Illness/Injury or Date of Surgery - Date 03/26/17   Referring Physician Flo Chacon MD   Patient/Family Goals Statement To improve his memory   Pertinent History of Current Problem Mr. Luke Henao is a 51-year-old Urdu gentleman who has been hospitalized since 03/26.  He presented initially to [facility] with myocardial infarction and was transferred to the [facility] for further workup and management.  He required intraaortic balloon pump for management.  Briefly in the cardiac domain, he had thrombectomy right coronary artery on 03/26 and a temporary pacer.  Subsequently had  drug-eluting stent to RCA, LAD and left circumflex, for which he is on antiplatelet with aspirin and clopidogrel.  He has been off the intraaortic balloon pump since 05/06. His postoperative course was also significant for left-sided weakness identified to be linked with an ischemic stroke.  This was bilateral in watershed distribution; however, clinically it is affecting the left side greater than the right.  He had poor oral intake augmented with nasal duodenal tube feeding, though he has advanced to a regular consistency diet and intake has been picking up.  He still has 1500 mL fluid restriction. Luke also developed a stage III coccygeal pressure ulcer. Pt is referred for cognitive-linguistic eval as there were some questionable mild changes in cognition   Precautions/Limitations fall precautions   General Observations Pleasant and cooperative but some emotional lability   Oral Motor Sensory Function   Completed on Swallow Evaluation Completed Clinical Bedside Swallow Evaluation  (completed while in hospital- tolerating a regular diet/ thin)   Language: Auditory Comprehension (understanding of spoken language)   Tests were  administered at the following levels Moderate (routine daily activities)   Two Step Commands (out of 5 total) 4   Yes/No Sentence and Simple Paragraph; [place] Diagnostic Aphasia Exam 3 short (out of 6 total) 4   Functional Assessment Scale (Auditory Comprehension) Mild Impairment   Comments (Auditory Comprehension) Pt with some difficulty with lengthier info-- suspect short term memory may be impacting.    Language: Verbal Expression (use of spoken language to express information)   Tests were administered at the following levels Complex (vocation/community/social activities)   Define Words; [place] Test for Differential Diagnosis Of Aphasia (out of 10 total) 10   Conversation; [place] Diagnostic Aphasia Exam rating (out of 5 total) 5   Functional Assessment Scale (Verbal Expression) No Impairment   Reading Comprehension (understanding of written language)   Tests were administered at the following levels Complex (vocation/community/social activities)   Sentences and Paragraphs; [place] Diagnostic Aphasia Exam (out of 10 total) (completed 8/8- at baseline level)   Functional Assessment Scale (Reading Comprehension) No Impairment   Cognitive Status Examination   Behavioral Observations WFL   Orientation mildly impaired   Short Term Memory impaired  (2/3 delayed recall; 9/15 paragraph recall)   Long Term Memory intact   Reasoning impaired  (4/4 verbal prob solv; 1/6 mod to complex reasoning)   Executive Function Deficits Noted mental flexibility   Additional cognitive-linguistic evaluation indicated  recommend   Standardized cognitive-linguistic assessment completed to be completed during future session   Cognitive Status Exam Comments mild- moderate deficits in recent memory and mod- complex level reasoning   General Therapy Interventions   Planned Therapy Interventions Cognitive Treatment;Language   Cognitive treatment Internal memory strategy training;External memory strategy training;Progressive attention training    Language Auditory comprehension   Clinical Impression, SLP Eval   Criteria for Skilled Therapeutic Interventions Met Yes;Treatment indicated   SLP Diagnosis Mild- moderate cognitive linguisic impairments   Influenced by the following factors/impairments ESL; cultural; basline level of functioning   Rehab Potential Good, to achieve stated therapy goals   Therapy Frequency Daily   Predicted Duration of Therapy Intervention (days/wks) 2 weeks   Anticipated Discharge Disposition Home with Home Therapy   Risks and Benefits of Treatment have been explained. Yes   Patient, Family & other staff in agreement with plan of care Yes   Clinical Impression Comments Completed speech-language eval  and informal cognitive assessment. Pt presents with generally intact verbal expression and reading comprehension but with mild deficits in auditory comprehension for more complex level info. With cognitive areas of recent verbal memory and mod to complex level problem solving and reasoning - pt demosntrates mild- moderate deficits overall. Pt will benefit from skilled SLP intervention to improve functional cognition for safety in IADL's   Total Evaluation Time      Total Evaluation Time (Minutes) 60  (30 speech-lang eval; 30 cognitive skills assessment)

## 2017-05-13 NOTE — PLAN OF CARE
Problem: Goal/Outcome  Goal: Goal Outcome Summary  Outcome: Completed Date Met:  05/13/17  OT evaluation completed pt has generalized weakness L>R, coordination deficits, impaired balance, ability to follow directions and ability to complete ADL and IADL tasks. Recommend pt transfer with min A of one for safety with some impulsivity and limited awareness noted.

## 2017-05-13 NOTE — PLAN OF CARE
Problem: Goal/Outcome  Goal: Goal Outcome Summary  Completed speech-language eval and informal cognitive assessment. Pt presents with generally intact verbal expression and reading comprehension but with mild deficits in auditory comprehension for more complex level info. With cognitive areas of recent verbal memory and mod to complex level problem solving and reasoning - pt demosntrates mild- moderate deficits overall. Pt will benefit from skilled SLP intervention to improve functional cognition for safety in IADL's

## 2017-05-13 NOTE — PLAN OF CARE
"Problem: Goal/Outcome  Goal: Goal Outcome Summary  FOCUS/GOAL  Bladder management, Nutrition/Feeding/Swallowing precautions and Wound care management     ASSESSMENT, INTERVENTIONS AND CONTINUING PLAN FOR GOAL:  Pt alert and oriented, Armenian, speaks some english but at times says \"yes\" inappropriately, for example when nurse asked an open ended question. Mepilex intact to sacrum PU. Dressing changed to right chest as ordered. Chavis patent large output. Jovita, daughter, ordered dinner and breakfast. Later family brought clam chowder. They were educated on low na fat diet. Feeding started at 6pm as ordered and will need to be disconnected at 6am. Continent of bowel, passing gas. Needs prompts and help repositioning otherwise pt will lay slouched sideways in bed. Wife plans to sleep in room tomorrow night. Stayed in bed, otherwise minimal assist transfers. Some pain at sacrum wound, repositioning, refused prn tylenol.           "

## 2017-05-13 NOTE — PROGRESS NOTES
Social Work: Initial Assessment with Discharge Plan    Patient Name: Luke Henao  : 1965  Age: 51 year old  MRN: [id number]  Completed assessment with: Pt and pt's   Admitted to ARU: 2017    Presenting Information   Date of SW assessment: May 13, 2017  Health Care Directive: Copy in Chart  Primary Health Care Agent: Noah Smiley (DTR) [phone]  Secondary Health Care Agent: Nathaniel Plummer (Wife) [phone]  Living Situation: Pt lives at home with his wife Nathaniel.  Previous Functional Status: Independent and active before hospitalization.   DME available: NA  Patient and family understanding of hospitalization: Pt stated that he is here because he is sick.   Cultural/Language/Spiritual Considerations: Romanian, Anglican is Taoist.      Physical Health  Reason for admission: STEMI and T2DM recurrent cardiogenic shock.     Provider Information   Primary Care Physician:Carline Ref-Primary, Physician   : [facility]    Mental Health/Chemical Dependency:   Diagnosis: None stated by the pt.   Alcohol/Tobacco/Narcotis: NA  Support/Services in Place: [facility]  Services Needed/Recommended: [facility]  Sexuality/Intimacy: No concerns    Support System  Marital Status:   Family support: Pt has the support of his wife Nathaniel ([phone]), THAD Sykes ([phone]), THAD Zamora ([phone]) and Sabino Galvez ([phone])  Other support available: Pt has an additional support of a relative named Cecil Plummer ([phone]).    Community Resources  Current in home services: No current in home services.  Previous services: None stated by pt or DTR.    Financial/Employment/Education  Employment Status: [employer]   Income Source: [employer]  Education: College  Financial Concerns:  None stated by pt.   Insurance: [employer] open access.      Discharge Plan   Patient and family discharge goal: Pt's goal is to return home with his wife Nathaniel.  Provided Education on discharge plan: YES  Patient agreeable to discharge  plan:  YES    Barriers to discharge: Medical clearance, complete therapy goals.     Discharge Recommendations   Disposition: Home with continued support from family.  Transportation Needs: Family will provide.   Name of Transportation Company and Phone: NA    Additional comments   Pt is a 51 year old male, has a good support system and would like to return home with his wife.     BIMS: Pt scored 12/15 showing moderately impaired. Writer documented the score in the flow-sheet on 5/13/2017.    Please invite to Care Conference: Nathaniel Plummer (Wife) [phone]       05/13/17 1100   Referral Information   Admission Type inpatient   Arrived From admitted as an inpatient   Referral Source admission list   Living Arrangements   Lives With spouse   Living Arrangements house   Able to Return to Prior Living Arrangements yes   Home Safety   Patient Feels Safe Living in Home? yes   Discharge Needs Assessment   Patient/family verbalizes understanding of discharge plan recommendations? Yes   Readmission Within The Last 30 Days no previous admission in last 30 days   Equipment Currently Used at Home none   Transportation Available car;family or friend will provide   Abuse Risk Screen   QUESTION TO PATIENT: Has a member of your family or a partner(now or in the past) intimidated, hurt, manipulated, or controlled you in any way? no   QUESTION TO PATIENT: Do you feel safe going back to the place where you are living? yes   OBSERVATION: Is there reason to believe there has been maltreatment of a vulnerable adult (ie. Physical/Sexual/Emotional abuse, self neglect, lack of adequate food, shelter, medical care, or financial exploitation)? no   Mental Health Suicide Risk   Have you ever thought about hurting yourself now or in the past? no       JACK Vincent  [facility]   P: [phone]  Pgr: [phone]

## 2017-05-13 NOTE — PROGRESS NOTES
[facility] Progress Note  Luke Henao  [id number]  May 13, 2017         Assessment & Plan:   Mr. Luke Henao is a 50 yo male with hx of smoking and DM admitted initially to [facility] on 3/26 with cardiogenic shock s/p PCI of RCA and placement of balloon pump, transferred to [facility] 2/2 inferior wall STEMI, now s/p placement of seven FEMI to LAD, LCx and RCA on 3/27 with post-procedure c/b recurrent cardiogenic shock, finally resolved s/p MitraClip placement on 5/1 2/2 severe ischemic MR, transferred to ARU on 5/12 for aggressive rehabilitation.     Coronary artery disease, Ischemic cardiomyopathy, recurrent cardiogenic shock (resolved): Developed resultant ischemic cardiomyopathy secondary to inferior wall STEMI and had 7 FEMI to LAD, LCx, RCA on 3/27. Change in EKG seen on 4/1 and repeat angio was performed showing LCx was  and was noted to be re-occluded (previously opened on 3/27). Despite PCI the patient continued to have refractory cardiogenic shock and inability to wean from the IABP. MitraClip placed on 5/1 for ischemic mitral reguritation. The IABP was slowly weaned post procedure. Echo 5/1 with inferior hypokinesis and est EF 30-35%. Discontinued IABP on 5/5. Pt's BPs stable since but this am low SBP in the 90s, but currently denies having any cardiovascular complaints. Wt today 119 lbs but question reliability as wt yesterday 123 lbs.  - PT/OT per PM&R  - D/c Bumex  - Continue Lisinopril but decrease dose to 2.5 mg BID  - Continue Digoxin 125 mcg daily   - Continue Spironolactone 12.5 mg daily  - Continue FR but liberalize from 1.5 to 1.8 L per day.   - Continue Plavix x 1 year  - Beta blocker being held in the setting of recent heart failure decompensation and will be added back as an outpatient.  - Daily wts / I&Os     Mitral regurg s/p MitraClip: Developed ischemic mitral regurgitation as a result of his STEMI. Due to refractory cardiogenic shock and inability to wean  from IABP a MitraClip procedure was performed.   - Needs lifelong antiplatelet with plan to continue Plavix for 1 year for FEMI then change to ASA.     H/o CVA: CVA developed left hand weakness and pain on 4/29 and a stroke code was called. CTA head/neck showed no hemorrhage but new areas of low attenuation in b/l hemispheres. Neuro consulted.   - Continuing Plavix and statin for prevention.   - PT/OT per PM&R     Type 2 diabetes mellitus: HA1C 7.5 on admission. Initially on insulin gtt and transitioned to Lantus and Novolog sliding scale. BSs over the past week have been under relatively fair control in ~ mid 100s  - For now continue with Lantus 25 units qam and Novolog medium correction scale  - Add Novolog 1 U per 15 g CHO with meals and snacks.  - Monitor sugars TID before meals, qhs and 0200.     Urinary retention: No history of BPH. Patient require dawkins catheter for much of hospitalization. Discontinued several days prior to discharge and since has had intermittent retention likely secondary to prolonged dawkins. And in fact, pt scanned yesterday with near 1 L retained urine after transfer to ARU, so Dawkins reinserted per primary team  - Continue routine Dawkins cares.   - Pt will need at least one week of bladder rest prior to trial void.     Hx of acute DVT: During hospitalization pt developed a non-occlusive clot in left IJ and left common femoral vein. Started on Coumadin with bridging heparin gtt low intensity. DVT considered provoked in the setting of prolonged hospitalization and critical illness and therefore warfarin course should be ~3-6 months. INR goal 2-3, most recent WNL.   - Pharmacy consulted to dose daily warfarin based on serial INRs. Appreciate assistance.   - F/u in warfarin clinic after discharge     Non-severe malnutrition: Within in the context of acute illness. Started on TFs in hospital of which he is tolerating.  - Nutrition consulted and appreciate following.   - Continue TF via NDT   -  Continue low Na diet  - Recommend calorie counts.       Sinus tachycardia: Persistent during hospitalization. No current evidence of infection. Possibly intra-vascularly depleted given Bumex use now discontinued as of today. Likely from recovering cardiac function.   - Will be started on a betablocker as an outpatient as cardiac function/heart failure continues to improve.       Hx of Lower GIB: Had several maroon BMs since 4/11, Hb overall dropped by a 1.5 grams or so, heparin infusion was held and GI consulted. Colonoscopy 4/17 showed a rectal ulcer thought secondary to retal tube but no intervenable lesions. Hgb remains stable and no recurrence of melena.  - Continue on Protonix 40 PO qD.      Insomnia: Difficulty sleeping intermittently throughout admission. Improved.   - Melatonin qHS and Seroquel 12.5mg qHS.     Hypoxic respiratory failure, resolved: Initially presented intubated after STEMI. Extubated on 4/3. Re-intubated in the setting of worsening hypoxia on 4/25, extubated 4/28. Thought to be aspiration pneumonia/concomittant HCAP. Had already been treated with broad spectrum antibiotic starting 4/19. Completed 2 week course. Continued to improve and on O2 for comfort upon transfer. Currently denies SOB and O2 sats on RA WNL.     Urinary tract infection, resolved: UA 4/15 with cloudy urine, 50 WBCs, many bacteria, no nitrates. UCx grew E coli - treated with CFTX (sensitive). Fever on 4/20 and single spike 100.2 overnight with unremarkable blood cultures. Completed Vanc and pip/tazo treatment course on 5/1.      Recommendations reviewed with attending physician Dr. Burks and communicated to primary team via this note.        Discharge Planning:   Per primary team        Interval History:   Yesterday had significant urinary retention so primary team ordered Chavis catheter to be placed. No acute events overnight. Denies other acute physical concerns at this time including fever, chills, chest pain, SOB, abd  pain, nausea and bowel concerns.          Physical Exam:   Vitals were reviewed  Blood pressure 96/68, pulse 104, temperature 99.3  F (37.4  C), temperature source Oral, resp. rate 16, height 1.524 m (5'), weight 54 kg (119 lb), SpO2 98 %.  GEN: In NAD  HEENT: NCAT; PERRL; sclerae non-icteric; NG tube intact; MMM  LUNGS: CTAB  CV: RRR  ABD: +BSs; SNTND  EXT: Trace BLE edema  : Chavis draining clear yellowish urine  SKIN: No acute rashes noted on exposed areas.  NEURO: AAOx3; CNs grossly intact; No acute focal deficits noted.        ROUTINE LABS:  CMP  Recent Labs  Lab 05/12/17  0651 05/11/17  1933 05/11/17  0638 05/10/17  1749 05/10/17  0623  05/09/17  0358  05/08/17  0404  05/07/17  0357    136 135 132* 136  --  136  < > 134  < > 135   POTASSIUM 4.6 4.0 4.1 3.9 3.9  < > 3.9  < > 3.9  < > 4.3   CHLORIDE 104 99 98 94 98  --  100  < > 98  < > 99   CO2 30 31 30 28 31  --  29  < > 30  < > 28   ANIONGAP 4 6 7 11 6  --  6  < > 6  < > 7   * 170* 128* 161* 158*  --  132*  < > 120*  < > 158*   BUN 23 26 28 25 22  --  21  < > 23  < > 23   CR 0.74 0.81 0.86 0.79 0.84  --  0.79  < > 0.77  < > 0.79   GFRESTIMATED >90Non  GFR Calc >90Non  GFR Calc >90Non  GFR Calc >90Non  GFR Calc >90Non  GFR Calc  --  >90Non  GFR Calc  < > >90Non  GFR Calc  < > >90Non  GFR Calc   GFRESTBLACK >90African American GFR Calc >90African American GFR Calc >90African American GFR Calc >90African American GFR Calc >90African American GFR Calc  --  >90African American GFR Calc  < > >90African American GFR Calc  < > >90African American GFR Calc   ROB 7.9* 8.1* 8.4* 8.4* 8.4*  --  7.9*  < > 7.9*  < > 7.9*   MAG 2.3 2.2 2.0 2.0 1.8  --  2.1  < > 2.4*  < > 2.5*   PHOS 3.1  --  3.6  --  4.2  --  4.3  --  4.1  --  3.7   PROTTOTAL  --   --   --   --   --   --   --   --  6.7*  --  6.8   ALBUMIN  --   --   --   --   --   --    --   --  2.4*  --  2.3*   BILITOTAL  --   --   --   --   --   --   --   --  0.3  --  0.4   ALKPHOS  --   --   --   --   --   --   --   --  64  --  70   AST  --   --   --   --   --   --   --   --  25  --  28   ALT  --   --   --   --   --   --   --   --  30  --  28   < > = values in this interval not displayed.       Recent Labs  Lab 05/13/17  1326 05/13/17  0640 05/12/17  2147 05/12/17  1706 05/12/17  1313 05/12/17  0651 05/11/17  2241 05/11/17  1933  05/11/17  0638  05/10/17  1749  05/10/17  0623  05/09/17  0358   GLC  --   --   --   --   --  135*  --  170*  --  128*  --  161*  --  158*  --  132*   * 199* 246* 147* 137*  --  179*  --   < >  --   < >  --   < >  --   < >  --    < > = values in this interval not displayed.      CBC  Recent Labs  Lab 05/12/17  0651 05/11/17  0638 05/10/17  0623 05/09/17  0358   WBC 8.5 8.7 7.2 7.7   RBC 3.31* 3.41* 3.19* 3.06*   HGB 9.8* 10.1* 9.3* 9.1*   HCT 32.0* 33.1* 31.0* 29.3*   MCV 97 97 97 96   MCH 29.6 29.6 29.2 29.7   MCHC 30.6* 30.5* 30.0* 31.1*   RDW 17.3* 17.6* 18.0* 19.0*    279 360 322     INR  Recent Labs  Lab 05/13/17  0529 05/12/17  0651 05/11/17  0638 05/10/17  0623   INR 2.01* 2.79* 2.48* 1.81*     Arterial Blood Gas  Recent Labs  Lab 05/09/17  1021 05/09/17  0358 05/09/17  0011 05/08/17 2034 05/08/17  1712   PH  --   --   --   --  7.43   PCO2  --   --   --   --  40   PO2  --   --   --   --  162*   HCO3  --   --   --   --  26   O2PER 21 21LPM 2LPM 2 L PM  --         Fabian Amanda PA-C  Internal Medicine Hospitalist & Staff [facility]  [phone]

## 2017-05-14 LAB
GLUCOSE BLDC GLUCOMTR-MCNC: 109 MG/DL (ref 70–99)
GLUCOSE BLDC GLUCOMTR-MCNC: 118 MG/DL (ref 70–99)
GLUCOSE BLDC GLUCOMTR-MCNC: 152 MG/DL (ref 70–99)
GLUCOSE BLDC GLUCOMTR-MCNC: 168 MG/DL (ref 70–99)
INR PPP: 1.72 (ref 0.86–1.14)

## 2017-05-14 PROCEDURE — 25000128 H RX IP 250 OP 636: Performed by: PHYSICIAN ASSISTANT

## 2017-05-14 PROCEDURE — 25000128 H RX IP 250 OP 636: Performed by: PHYSICAL MEDICINE & REHABILITATION

## 2017-05-14 PROCEDURE — 99232 SBSQ HOSP IP/OBS MODERATE 35: CPT | Performed by: PHYSICIAN ASSISTANT

## 2017-05-14 PROCEDURE — 40000225 ZZH STATISTIC SLP WARD VISIT: Performed by: SPEECH-LANGUAGE PATHOLOGIST

## 2017-05-14 PROCEDURE — 40000193 ZZH STATISTIC PT WARD VISIT: Performed by: PHYSICAL THERAPIST

## 2017-05-14 PROCEDURE — 25000132 ZZH RX MED GY IP 250 OP 250 PS 637: Performed by: PHYSICAL MEDICINE & REHABILITATION

## 2017-05-14 PROCEDURE — 97112 NEUROMUSCULAR REEDUCATION: CPT | Mod: GP | Performed by: PHYSICAL THERAPIST

## 2017-05-14 PROCEDURE — 90732 PPSV23 VACC 2 YRS+ SUBQ/IM: CPT | Performed by: PHYSICAL MEDICINE & REHABILITATION

## 2017-05-14 PROCEDURE — 25000132 ZZH RX MED GY IP 250 OP 250 PS 637: Performed by: PHYSICIAN ASSISTANT

## 2017-05-14 PROCEDURE — 97532 ZZHC SP COGNITIVE SKILLS EA 15 MIN: CPT | Mod: GN | Performed by: SPEECH-LANGUAGE PATHOLOGIST

## 2017-05-14 PROCEDURE — 97110 THERAPEUTIC EXERCISES: CPT | Mod: GP | Performed by: PHYSICAL THERAPIST

## 2017-05-14 PROCEDURE — 36415 COLL VENOUS BLD VENIPUNCTURE: CPT | Performed by: PHYSICIAN ASSISTANT

## 2017-05-14 PROCEDURE — 97535 SELF CARE MNGMENT TRAINING: CPT | Mod: GO

## 2017-05-14 PROCEDURE — 12800006 ZZH R&B REHAB

## 2017-05-14 PROCEDURE — 97116 GAIT TRAINING THERAPY: CPT | Mod: GP | Performed by: PHYSICAL THERAPIST

## 2017-05-14 PROCEDURE — 40000133 ZZH STATISTIC OT WARD VISIT

## 2017-05-14 PROCEDURE — 85610 PROTHROMBIN TIME: CPT | Performed by: PHYSICIAN ASSISTANT

## 2017-05-14 PROCEDURE — 00000146 ZZHCL STATISTIC GLUCOSE BY METER IP

## 2017-05-14 PROCEDURE — 27210436 ZZH NUTRITION PRODUCT SEMIELEM INTERMED CAN

## 2017-05-14 RX ORDER — WARFARIN SODIUM 2.5 MG/1
2.5 TABLET ORAL
Status: COMPLETED | OUTPATIENT
Start: 2017-05-14 | End: 2017-05-14

## 2017-05-14 RX ORDER — PANTOPRAZOLE SODIUM 20 MG/1
40 TABLET, DELAYED RELEASE ORAL DAILY
Status: DISCONTINUED | OUTPATIENT
Start: 2017-05-15 | End: 2017-05-22

## 2017-05-14 RX ORDER — DIPHENHYDRAMINE HCL 25 MG
25-50 CAPSULE ORAL
Status: DISCONTINUED | OUTPATIENT
Start: 2017-05-14 | End: 2017-05-25

## 2017-05-14 RX ADMIN — Medication 12.5 MG: at 07:51

## 2017-05-14 RX ADMIN — ACETAMINOPHEN 650 MG: 325 TABLET ORAL at 19:25

## 2017-05-14 RX ADMIN — PNEUMOCOCCAL VACCINE POLYVALENT 0.5 ML
25; 25; 25; 25; 25; 25; 25; 25; 25; 25; 25; 25; 25; 25; 25; 25; 25; 25; 25; 25; 25; 25; 25 INJECTION, SOLUTION INTRAMUSCULAR; SUBCUTANEOUS at 12:30

## 2017-05-14 RX ADMIN — ACETAMINOPHEN 650 MG: 325 TABLET ORAL at 01:02

## 2017-05-14 RX ADMIN — SENNOSIDES AND DOCUSATE SODIUM 2 TABLET: 8.6; 5 TABLET ORAL at 07:50

## 2017-05-14 RX ADMIN — DIGOXIN 125 MCG: 125 TABLET ORAL at 07:51

## 2017-05-14 RX ADMIN — MELATONIN TAB 3 MG 3 MG: 3 TAB at 19:25

## 2017-05-14 RX ADMIN — LISINOPRIL 2.5 MG: 2.5 TABLET ORAL at 07:51

## 2017-05-14 RX ADMIN — ENOXAPARIN SODIUM 50 MG: 60 INJECTION SUBCUTANEOUS at 23:49

## 2017-05-14 RX ADMIN — DIPHENHYDRAMINE HYDROCHLORIDE 25 MG: 25 CAPSULE ORAL at 21:44

## 2017-05-14 RX ADMIN — ATORVASTATIN CALCIUM 40 MG: 40 TABLET, FILM COATED ORAL at 19:25

## 2017-05-14 RX ADMIN — DIPHENHYDRAMINE HYDROCHLORIDE 25 MG: 25 CAPSULE ORAL at 23:56

## 2017-05-14 RX ADMIN — PANTOPRAZOLE SODIUM 40 MG: 40 TABLET, DELAYED RELEASE ORAL at 07:50

## 2017-05-14 RX ADMIN — INSULIN GLARGINE 25 UNITS: 100 INJECTION, SOLUTION SUBCUTANEOUS at 07:59

## 2017-05-14 RX ADMIN — ENOXAPARIN SODIUM 50 MG: 60 INJECTION SUBCUTANEOUS at 12:32

## 2017-05-14 RX ADMIN — WARFARIN SODIUM 2.5 MG: 2.5 TABLET ORAL at 17:22

## 2017-05-14 RX ADMIN — CLOPIDOGREL BISULFATE 75 MG: 75 TABLET, FILM COATED ORAL at 07:50

## 2017-05-14 NOTE — PROGRESS NOTES
PM&R Daily Note:    Participating well.    INR dropped bit low 1.72, restarted bridge enoxaparin. Continue warfarin  BP's bit better, continue with held bumex and lower lisinopril. Liberalized fluid restriction from 1500 1800 mL.  Appreciate hospitalist consult support    BM better, continue softener and prn supp.  Continue with indwelling dawkins about a week after over distension and retention. Consider starting tamsulosin if BP's would tolerate but not yet.      Vitals:    05/13/17 1953 05/14/17 0627 05/14/17 0747 05/14/17 1541   BP: 99/66  111/77 105/79   BP Location:   Right arm Right arm   Pulse:   69 110   Resp:   18 18   Temp:    98.2  F (36.8  C)   TempSrc:    Oral   SpO2:   97% 95%   Weight:  53.9 kg (118 lb 14.4 oz)     Height:         Alert, conversant  No diaphoresis  Heart RRR  Right upper chest with packing still about 2 inches worth packed in, wound base visible clean.  Ok to shower today, expose to water and repack.  Lungs clear  Dawkins with urine, slight concentrated but clear.      warfarin  2.5 mg Oral ONCE at 18:00     enoxaparin  1 mg/kg Subcutaneous Q12H     [START ON 5/15/2017] pantoprazole  40 mg Oral Daily     lisinopril  2.5 mg Oral BID     insulin aspart   Subcutaneous TID w/meals     senna-docusate  2 tablet Oral BID     atorvastatin  40 mg Oral or Feeding Tube QPM     clopidogrel  75 mg Oral Daily     digoxin  125 mcg Oral Daily     insulin glargine  25 Units Subcutaneous QAM AC     spironolactone  12.5 mg Oral Daily     insulin aspart  1-7 Units Subcutaneous TID AC     insulin aspart  1-5 Units Subcutaneous At Bedtime

## 2017-05-14 NOTE — PLAN OF CARE
Problem: Goal/Outcome  Goal: Goal Outcome Summary  Outcome: Improving  OT ADL session completed for shower, grooming and dressing. Pt tolerated session well and required min A for shower, max for LBD and I for UBD. Pt required CGA throughout for steadying and cues for transfers.

## 2017-05-14 NOTE — PLAN OF CARE
Problem: Goal/Outcome  Goal: Goal Outcome Summary  Outcome: No Change  FOCUS/GOAL  Bladder management, Nutrition/Feeding/Swallowing precautions and Pain management     ASSESSMENT, INTERVENTIONS AND CONTINUING PLAN FOR GOAL:     Patient calling for assistance to and from bathroom. Wife at bedside. Tube feeding infusing through NG without difficulty. Given Tylenol x 1 per request for pain. HOB up 30 degrees. Sleeping between cares. Continue plan of care.

## 2017-05-14 NOTE — PLAN OF CARE
Problem: Goal/Outcome  Goal: Goal Outcome Summary  FOCUS/GOAL  Nutrition/Feeding/Swallowing precautions and Medical management     ASSESSMENT, INTERVENTIONS AND CONTINUING PLAN FOR GOAL:  Pt alert, some difficulty with communication due to language barrier, for example educated patient and spouse that HOB needs to be 30  to prevent aspiration, they verbalized understanding but a few min later attempted to lower HOB. Also educated on checking BG before meal. Family brought vegetable soup and white rice. Daughter called for staff to check BG. Pt ate about 1 cup and 1 bite of rice. Later ate fish, salad and vanilla ice cream from kitchen and received carb count. Educated on sliding scale and carb counting. Spouse had thrown receipt away. Diabetes consult order placed. Also educated on fluid restriction, 3g Na diet. Family also asking if they can help pt to bathroom, educated that until therapy and Doctor okay this, staff has to help at this time. TF started at 6pm as ordered. Chavis patent. Last documented BM 5/12 but feeling constipated, received senokot. Denies feeling dizzy or lightheaded with low BP, states he thinks his pulse has been on higher side. Pt wanted tylenol and melatonin later, when nurse entered pt aroused but then went back to sleep and not able to take.

## 2017-05-14 NOTE — PLAN OF CARE
Problem: Goal/Outcome  Goal: Goal Outcome Summary  Completed pt iding problem situations on picture cards- pt able to di on 5/5 attemptes but needed increased time and occasional cueing to aid in attention to detail and for solving problem. Pt able to complete memory recallt ask- hearing 5 words and then answering a question that related to 1 of the words- heard the words read 2x each-- pt at 4/7 accuracy with thi. With visual recall task- pt only able to id and recall 4/13 items-- memory recall is variable.

## 2017-05-14 NOTE — PROGRESS NOTES
[facility] Progress Note  Luke Henao  [id number]  May 14, 2017         Assessment & Plan:   Mr. Luke Henao is a 52 yo male with hx of smoking and DM admitted initially to [facility] on 3/26 with cardiogenic shock s/p PCI of RCA and placement of balloon pump, transferred to [facility] 2/2 inferior wall STEMI, now s/p placement of seven FEMI to LAD, LCx and RCA on 3/27 with post-procedure c/b recurrent cardiogenic shock, finally resolved s/p MitraClip placement on 5/1 2/2 severe ischemic MR, transferred to ARU on 5/12 for aggressive rehabilitation.     Coronary artery disease, Ischemic cardiomyopathy, recurrent cardiogenic shock (resolved): Developed resultant ischemic cardiomyopathy secondary to inferior wall STEMI and had 7 FEMI to LAD, LCx, RCA on 3/27. Change in EKG seen on 4/1 and repeat angio was performed showing LCx was  and was noted to be re-occluded (previously opened on 3/27). Despite PCI the patient continued to have refractory cardiogenic shock and inability to wean from the IABP. MitraClip placed on 5/1 for ischemic mitral reguritation. The IABP was slowly weaned post procedure. Echo 5/1 with inferior hypokinesis and est EF 30-35%. Discontinued IABP on 5/5. Pt's BPs have been stable but borderline hypotensive so Bumex discontinued and lisinopril dose decreased yesterday (5/13) and BPs since improved, most recent today 111/77. Continues to deny having any cardiovascular complaints. Wt today 118 and he appears currently euvolemic.   - PT/OT per PM&R  - Continue lisinopril 2.5 mg BID  - Continue to hold Bumex and monitor volume status closely   - Daily wts / strict I&Os  - Continue Digoxin 125 mcg daily   - Continue Spironolactone 12.5 mg daily  - Continue FR 1.8 L per day.   - Continue Plavix x 1 year  - Beta blocker being held in the setting of recent heart failure decompensation and will be added back as an outpatient.     Mitral regurg s/p MitraClip: Developed ischemic  mitral regurgitation as a result of his STEMI. Due to refractory cardiogenic shock and inability to wean from IABP a MitraClip procedure was performed.   - Needs lifelong antiplatelet with plan to continue Plavix for 1 year for FEMI then change to ASA.     H/o CVA: CVA developed left hand weakness and pain on 4/29 and a stroke code was called. CTA head/neck showed no hemorrhage but new areas of low attenuation in b/l hemispheres. Neuro consulted.   - Continuing Plavix and statin for prevention.   - PT/OT per PM&R     Type 2 diabetes mellitus: HA1C 7.5 on admission. Initially on insulin gtt and transitioned to Lantus and Novolog sliding scale. BSs over the past week have been under relatively fair control in ~ mid 100s. Add additional carb coverage with each meal yesterday (5/13).  - For now continue with Lantus 25 units qam   - Continue Novolog 1 U per 15 g CHO with meals and snacks.  - Cont Novolog medium correction scale  - Monitor sugars TID before meals, qhs and 0200.     Urinary retention: No history of BPH. Patient require dawkins catheter for much of hospitalization. Discontinued several days prior to discharge and since has had intermittent retention likely secondary to prolonged dawkins. And in fact, pt scanned 5/12 with near 1 L retained urine after transfer to ARU, so Dawkins reinserted per primary team  - Continue routine Dawkins cares.   - Pt will need at least one week of bladder rest prior to trial void (~5/19).     Hx of acute DVT: During hospitalization pt developed a non-occlusive clot in left IJ and left common femoral vein. Started on Coumadin with bridging heparin gtt low intensity. DVT considered provoked in the setting of prolonged hospitalization and critical illness and therefore warfarin course should be ~3-6 months. INR goal 2-3, most recent subtherapeutic at 1.72.   - Pharmacy consulted to dose daily warfarin based on serial INRs. Appreciate assistance.  - Start bridging Lovenox until INR gets  back at goal between 2-3.   - F/u in warfarin clinic after discharge     Non-severe malnutrition: Within in the context of acute illness. Started on TFs in hospital of which he is tolerating.  - Nutrition consulted and appreciate following.   - Continue TF via NDT   - Continue low Na diet  - Recommend calorie counts.       Sinus tachycardia: Persistent during hospitalization. No current evidence of infection. Likely from recovering cardiac function. Resolved as of today (HR 69).  - Continue to monitor.   - Will be started on a betablocker as an outpatient as cardiac function/heart failure continues to improve.     Hx of Lower GIB: Had several maroon BMs since 4/11, Hb overall dropped by a 1.5 grams or so, heparin infusion was held and GI consulted. Colonoscopy 4/17 showed a rectal ulcer thought secondary to retal tube but no intervenable lesions. Hgb remains stable and no recurrence of melena.  - Continue on Protonix 40 PO qD.      Insomnia: Difficulty sleeping intermittently throughout admission. Improved.   - Melatonin qHS and Seroquel 12.5mg qHS.     Hypoxic respiratory failure, resolved: Initially presented intubated after STEMI. Extubated on 4/3. Re-intubated in the setting of worsening hypoxia on 4/25, extubated 4/28. Thought to be aspiration pneumonia/concomittant HCAP. Had already been treated with broad spectrum antibiotic starting 4/19. Completed 2 week course. Continued to improve and on O2 for comfort upon transfer. Currently denies SOB and O2 sats on RA WNL.     Urinary tract infection, resolved: UA 4/15 with cloudy urine, 50 WBCs, many bacteria, no nitrates. UCx grew E coli - treated with CFTX (sensitive). Fever on 4/20 and single spike 100.2 overnight with unremarkable blood cultures. Completed Vanc and pip/tazo treatment course on 5/1.      Recommendations reviewed with attending physician Dr. Burks and communicated to primary team via this note.          Discharge Planning:   Per primary team         Interval History:   No acute events overnight. Denies acute physical concerns at this time including fever, chills, chest pain, SOB, nausea, abd pain, and bowel concerns. Denies concerns related to his indwelling Chavis catheter.          Physical Exam:   Vitals were reviewed  Blood pressure 111/77, pulse 69, temperature 97.2  F (36.2  C), temperature source Oral, resp. rate 18, height 1.524 m (5'), weight 53.9 kg (118 lb 14.4 oz), SpO2 97 %.  GEN: In NAD  HEENT: NCAT; PERRL; sclerae non-icteric; NG tube intact; MMM  LUNGS: CTAB  CV: RRR  ABD: +BSs; SNTND  EXT: Trace BLE edema  : Chavis draining clear yellowish urine  SKIN: No acute rashes noted on exposed areas.  NEURO: AAOx3; CNs grossly intact; No acute focal deficits noted.        ROUTINE LABS:  CMP  Recent Labs  Lab 05/12/17  0651 05/11/17  1933 05/11/17  0638 05/10/17  1749 05/10/17  0623  05/09/17  0358  05/08/17  0404    136 135 132* 136  --  136  < > 134   POTASSIUM 4.6 4.0 4.1 3.9 3.9  < > 3.9  < > 3.9   CHLORIDE 104 99 98 94 98  --  100  < > 98   CO2 30 31 30 28 31  --  29  < > 30   ANIONGAP 4 6 7 11 6  --  6  < > 6   * 170* 128* 161* 158*  --  132*  < > 120*   BUN 23 26 28 25 22  --  21  < > 23   CR 0.74 0.81 0.86 0.79 0.84  --  0.79  < > 0.77   GFRESTIMATED >90Non  GFR Calc >90Non  GFR Calc >90Non  GFR Calc >90Non  GFR Calc >90Non  GFR Calc  --  >90Non  GFR Calc  < > >90Non  GFR Calc   GFRESTBLACK >90African American GFR Calc >90African American GFR Calc >90African American GFR Calc >90African American GFR Calc >90African American GFR Calc  --  >90African American GFR Calc  < > >90African American GFR Calc   ROB 7.9* 8.1* 8.4* 8.4* 8.4*  --  7.9*  < > 7.9*   MAG 2.3 2.2 2.0 2.0 1.8  --  2.1  < > 2.4*   PHOS 3.1  --  3.6  --  4.2  --  4.3  --  4.1   PROTTOTAL  --   --   --   --   --   --   --   --  6.7*   ALBUMIN  --   --   --    --   --   --   --   --  2.4*   BILITOTAL  --   --   --   --   --   --   --   --  0.3   ALKPHOS  --   --   --   --   --   --   --   --  64   AST  --   --   --   --   --   --   --   --  25   ALT  --   --   --   --   --   --   --   --  30   < > = values in this interval not displayed.       Recent Labs  Lab 05/14/17  0747 05/13/17  2053 05/13/17  1629 05/13/17  1326 05/13/17  0640 05/12/17  2147  05/12/17  0651  05/11/17  1933  05/11/17  0638  05/10/17  1749  05/10/17  0623  05/09/17 0358   GLC  --   --   --   --   --   --   --  135*  --  170*  --  128*  --  161*  --  158*  --  132*   * 182* 107* 170* 199* 246*  < >  --   < >  --   < >  --   < >  --   < >  --   < >  --    < > = values in this interval not displayed.      CBC    Recent Labs  Lab 05/12/17  0651 05/11/17  0638 05/10/17  0623 05/09/17  0358   WBC 8.5 8.7 7.2 7.7   RBC 3.31* 3.41* 3.19* 3.06*   HGB 9.8* 10.1* 9.3* 9.1*   HCT 32.0* 33.1* 31.0* 29.3*   MCV 97 97 97 96   MCH 29.6 29.6 29.2 29.7   MCHC 30.6* 30.5* 30.0* 31.1*   RDW 17.3* 17.6* 18.0* 19.0*    279 360 322     INR    Recent Labs  Lab 05/14/17  0618 05/13/17  0529 05/12/17  0651 05/11/17  0638   INR 1.72* 2.01* 2.79* 2.48*     Arterial Blood Gas    Recent Labs  Lab 05/09/17  1021 05/09/17  0358 05/09/17  0011 05/08/17 2034 05/08/17  1712   PH  --   --   --   --  7.43   PCO2  --   --   --   --  40   PO2  --   --   --   --  162*   HCO3  --   --   --   --  26   O2PER 21 21LPM 2LPM 2 L PM  --         Fabian Amanda PA-C  Internal Medicine Hospitalist & Staff [facility]  [phone]

## 2017-05-14 NOTE — PLAN OF CARE
Problem: Goal/Outcome  Goal: Goal Outcome Summary  Outcome: No Change  Patient is A&O, able to make needs known, using call light appropriately with wifes assistance. Patient was up with an assist of 1, gait belt and walker. Manages clothing and jonelle cares with assist of 1. Bowel sounds present, last BM 05/14 per patient and wife report, Dawkins catheters patent, dawkins cares completed. No complaint of dizziness, chest pain or SOB. Dressing changed to bottom, wound cares completed along with wound packing to right upper chest, old balloon site on right upper chest looks open and liquid bandage peeling, site reddened  updated, will cover with dressing to protect. Pneumo vaccine given today. Tolerating regular diet, good appetite this shift, eating 100%, no Nausea. Requested tray be delivered to desk as patient eats without getting blood sugar checked, educated wife and patient but still unsure if they understand d/t language barrier. Continue with bed alarms for safety. Continue POC.

## 2017-05-14 NOTE — PLAN OF CARE
Problem: Goal/Outcome  Goal: Goal Outcome Summary  Pt had a good Pt session despite no interpretor showing up for appointment.  He understood enough english to have good participation.   Mild difficulty with communicating higher level topics.  Pt wife present and beginning and end of session in room.  Pt continues to progress toward goals.

## 2017-05-14 NOTE — PHARMACY
Pharmacy Tube Feeding Consult    Medication reviewed for administration by feeding tube and for potential food/drug interactions.    Recommendation: No changes are needed at this time. Pt is able to swallow pills.     Pharmacy will continue to follow as new medications are ordered.

## 2017-05-14 NOTE — PROGRESS NOTES
PM&R Daily Note:    Just starting full therapy evaluations, thus far participating well. Does help to have  for all but most basic of conversations to assure accuracy.    INR therapeutic 2.01. Continue warfarin  BP's on lower side, stopping bumex and lowering lisinopril. Liberalize fluid restriction from 1500 to 1800 mL. Discussed with hospitalist JOSE ELIAS Amanda. Appreciate consult support    Feeling constipated, adding softener and prn supp.  Continue with indwelling dawkins about a week after over distension and retention. Consider starting tamsulosin if BP's would tolerate but not yet.      Vitals:    05/13/17 0036 05/13/17 0632 05/13/17 0830 05/13/17 1630   BP: 90/64  96/68 (!) 89/63   BP Location: Right arm  Right arm Right arm   Pulse: 114  104 109   Resp: 16      Temp: 99.3  F (37.4  C)   97.2  F (36.2  C)   TempSrc: Oral   Oral   SpO2: 97%  98% 99%   Weight:  54 kg (119 lb)     Height:         Alert, conversant  No diaphoresis  Heart RRR  Lungs clear  Dawkins with urine, slight concentrated but clear.

## 2017-05-15 LAB
ANION GAP SERPL CALCULATED.3IONS-SCNC: 7 MMOL/L (ref 3–14)
BUN SERPL-MCNC: 23 MG/DL (ref 7–30)
CALCIUM SERPL-MCNC: 8.8 MG/DL (ref 8.5–10.1)
CHLORIDE SERPL-SCNC: 108 MMOL/L (ref 94–109)
CO2 SERPL-SCNC: 25 MMOL/L (ref 20–32)
COPATH REPORT: NORMAL
CREAT SERPL-MCNC: 0.68 MG/DL (ref 0.66–1.25)
ERYTHROCYTE [DISTWIDTH] IN BLOOD BY AUTOMATED COUNT: 16.3 % (ref 10–15)
GFR SERPL CREATININE-BSD FRML MDRD: ABNORMAL ML/MIN/1.7M2
GLUCOSE BLDC GLUCOMTR-MCNC: 150 MG/DL (ref 70–99)
GLUCOSE BLDC GLUCOMTR-MCNC: 176 MG/DL (ref 70–99)
GLUCOSE BLDC GLUCOMTR-MCNC: 178 MG/DL (ref 70–99)
GLUCOSE BLDC GLUCOMTR-MCNC: 190 MG/DL (ref 70–99)
GLUCOSE BLDC GLUCOMTR-MCNC: 83 MG/DL (ref 70–99)
GLUCOSE SERPL-MCNC: 146 MG/DL (ref 70–99)
HCT VFR BLD AUTO: 33.4 % (ref 40–53)
HGB BLD-MCNC: 10.7 G/DL (ref 13.3–17.7)
INR PPP: 1.66 (ref 0.86–1.14)
MAGNESIUM SERPL-MCNC: 1.8 MG/DL (ref 1.6–2.3)
MCH RBC QN AUTO: 30.1 PG (ref 26.5–33)
MCHC RBC AUTO-ENTMCNC: 32 G/DL (ref 31.5–36.5)
MCV RBC AUTO: 94 FL (ref 78–100)
PHOSPHATE SERPL-MCNC: 4.4 MG/DL (ref 2.5–4.5)
PLATELET # BLD AUTO: 376 10E9/L (ref 150–450)
POTASSIUM SERPL-SCNC: 4.4 MMOL/L (ref 3.4–5.3)
RBC # BLD AUTO: 3.55 10E12/L (ref 4.4–5.9)
SODIUM SERPL-SCNC: 140 MMOL/L (ref 133–144)
WBC # BLD AUTO: 7.9 10E9/L (ref 4–11)

## 2017-05-15 PROCEDURE — 99232 SBSQ HOSP IP/OBS MODERATE 35: CPT | Performed by: NURSE PRACTITIONER

## 2017-05-15 PROCEDURE — 97530 THERAPEUTIC ACTIVITIES: CPT | Mod: GO | Performed by: STUDENT IN AN ORGANIZED HEALTH CARE EDUCATION/TRAINING PROGRAM

## 2017-05-15 PROCEDURE — 85027 COMPLETE CBC AUTOMATED: CPT | Performed by: PHYSICIAN ASSISTANT

## 2017-05-15 PROCEDURE — 25000132 ZZH RX MED GY IP 250 OP 250 PS 637: Performed by: PHYSICIAN ASSISTANT

## 2017-05-15 PROCEDURE — 97532 ZZHC SP COGNITIVE SKILLS EA 15 MIN: CPT | Mod: GN | Performed by: SPEECH-LANGUAGE PATHOLOGIST

## 2017-05-15 PROCEDURE — 36415 COLL VENOUS BLD VENIPUNCTURE: CPT | Performed by: PHYSICIAN ASSISTANT

## 2017-05-15 PROCEDURE — 00000146 ZZHCL STATISTIC GLUCOSE BY METER IP

## 2017-05-15 PROCEDURE — 40000193 ZZH STATISTIC PT WARD VISIT

## 2017-05-15 PROCEDURE — 12800006 ZZH R&B REHAB

## 2017-05-15 PROCEDURE — 97116 GAIT TRAINING THERAPY: CPT | Mod: GP

## 2017-05-15 PROCEDURE — 40000225 ZZH STATISTIC SLP WARD VISIT: Performed by: SPEECH-LANGUAGE PATHOLOGIST

## 2017-05-15 PROCEDURE — 97112 NEUROMUSCULAR REEDUCATION: CPT | Mod: GP

## 2017-05-15 PROCEDURE — 25000132 ZZH RX MED GY IP 250 OP 250 PS 637: Performed by: PHYSICAL MEDICINE & REHABILITATION

## 2017-05-15 PROCEDURE — 40000133 ZZH STATISTIC OT WARD VISIT: Performed by: STUDENT IN AN ORGANIZED HEALTH CARE EDUCATION/TRAINING PROGRAM

## 2017-05-15 PROCEDURE — 99207 ZZC CDG-CODE CATEGORY CHANGED: CPT | Performed by: NURSE PRACTITIONER

## 2017-05-15 PROCEDURE — 27210436 ZZH NUTRITION PRODUCT SEMIELEM INTERMED CAN

## 2017-05-15 PROCEDURE — 83735 ASSAY OF MAGNESIUM: CPT | Performed by: PHYSICIAN ASSISTANT

## 2017-05-15 PROCEDURE — 97535 SELF CARE MNGMENT TRAINING: CPT | Mod: GO | Performed by: STUDENT IN AN ORGANIZED HEALTH CARE EDUCATION/TRAINING PROGRAM

## 2017-05-15 PROCEDURE — 80048 BASIC METABOLIC PNL TOTAL CA: CPT | Performed by: PHYSICIAN ASSISTANT

## 2017-05-15 PROCEDURE — 85610 PROTHROMBIN TIME: CPT | Performed by: PHYSICIAN ASSISTANT

## 2017-05-15 PROCEDURE — 84100 ASSAY OF PHOSPHORUS: CPT | Performed by: PHYSICIAN ASSISTANT

## 2017-05-15 PROCEDURE — 25000128 H RX IP 250 OP 636: Performed by: PHYSICIAN ASSISTANT

## 2017-05-15 RX ORDER — WARFARIN SODIUM 3 MG/1
3 TABLET ORAL
Status: COMPLETED | OUTPATIENT
Start: 2017-05-15 | End: 2017-05-15

## 2017-05-15 RX ADMIN — DIGOXIN 125 MCG: 125 TABLET ORAL at 09:08

## 2017-05-15 RX ADMIN — ACETAMINOPHEN 650 MG: 325 TABLET ORAL at 16:23

## 2017-05-15 RX ADMIN — ENOXAPARIN SODIUM 50 MG: 60 INJECTION SUBCUTANEOUS at 09:06

## 2017-05-15 RX ADMIN — ENOXAPARIN SODIUM 50 MG: 60 INJECTION SUBCUTANEOUS at 21:46

## 2017-05-15 RX ADMIN — INSULIN GLARGINE 25 UNITS: 100 INJECTION, SOLUTION SUBCUTANEOUS at 09:07

## 2017-05-15 RX ADMIN — CLOPIDOGREL BISULFATE 75 MG: 75 TABLET, FILM COATED ORAL at 09:07

## 2017-05-15 RX ADMIN — Medication 12.5 MG: at 09:07

## 2017-05-15 RX ADMIN — INSULIN ASPART 1 UNITS: 100 INJECTION, SOLUTION INTRAVENOUS; SUBCUTANEOUS at 12:16

## 2017-05-15 RX ADMIN — ACETAMINOPHEN 650 MG: 325 TABLET ORAL at 12:23

## 2017-05-15 RX ADMIN — INSULIN ASPART 1 UNITS: 100 INJECTION, SOLUTION INTRAVENOUS; SUBCUTANEOUS at 09:09

## 2017-05-15 RX ADMIN — WARFARIN SODIUM 3 MG: 3 TABLET ORAL at 17:58

## 2017-05-15 RX ADMIN — PANTOPRAZOLE SODIUM 40 MG: 20 TABLET, DELAYED RELEASE ORAL at 09:07

## 2017-05-15 RX ADMIN — ATORVASTATIN CALCIUM 40 MG: 40 TABLET, FILM COATED ORAL at 21:46

## 2017-05-15 NOTE — PROGRESS NOTES
05/13/17 0823   Quick Adds   Type of Visit Initial PT Evaluation       Present yes   Language Chinese   Living Environment   Lives With spouse   Living Arrangements house   Home Accessibility stairs to enter home;stairs (2 railings present)   Number of Stairs to Enter Home 2   Number of Stairs Within Home 0   Stair Railings at Home outside, present at both sides   Transportation Available car;family or friend will provide   Living Environment Comment Pt:  Pt lives in house in [place] with wife.  He has 4 children who are supportive alos.   Self-Care   Dominant Hand right   Usual Activity Tolerance good   Current Activity Tolerance poor   Regular Exercise yes   Activity/Exercise Type running/jogging   Exercise Amount/Frequency daily   Equipment Currently Used at Home none   Activity/Exercise/Self-Care Comment PT:  Pt reports daily exercise involving running.   Functional Level Prior   Ambulation 0-->independent   Transferring 0-->independent   Toileting 0-->independent   Communication 0-->understands/communicates without difficulty   Cognition 0 - no cognition issues reported   Fall history within last six months yes   Number of times patient has fallen within last six months 1   Which of the above functional risks had a recent onset or change? ambulation;transferring;toileting;fall history   Prior Functional Level Comment PT:  Pt was Ind prior to admission with all mobility and ADLs.  Pt had 1 fall in bathroom yesterday 5/12.   General Information   Onset of Illness/Injury or Date of Surgery - Date 03/26/17   Referring Physician Flo Chacon MD   Patient/Family Goals Statement To get better and go home   Pertinent History of Current Problem (include personal factors and/or comorbidities that impact the POC) From H&P:  Pt presents to ARU following month and a half hospital stay starting with significant myocardial infarction with cardiogenic shock, but also ischemic stroke watershed  distribution affecting the left side greater than right.  Additional medical comorbidities with diabetes, hypertension, hyperlipidemia, stage III coccygeal pressure ulcer, right upper chest open wound still from recent vascular access and balloon pump.   Precautions/Limitations fall precautions   General Observations PT:  Pt does speak and understand some english   Cognitive Status Examination   Orientation orientation to person, place and time   Level of Consciousness alert   Follows Commands and Answers Questions able to follow multistep instructions;50% of the time;able to follow single-step instructions;75% of the time   Personal Safety and Judgment impaired;at risk behaviors demonstrated   Memory impaired   Cognitive Comment PT:  Pt presents with mod deficits for insight in to safe transfers and mobility.  He does have difficulty following instructions, but may be due to language barrier.  PT will defer to SLP or OT for further exploration nto cognitiona nd memory deficits.   Pain Assessment   Patient Currently in Pain No   Integumentary/Edema   Integumentary/Edema no deficits were identifed   Posture    Posture Comments PT:  Pt had poor postural awareness and moderate lean to R side.  He also has a pressure sore and often leans to decreased pressure/pain on spot.     Range of Motion (ROM)   ROM Comment PT:  Pt has mild IR deficits in B hips.  No other ROM deficits noted.   Strength   Strength Comments PT:  Pt demonstrates gross general weakness with increase weakness on L side due to stroke.  On R pt has 3/5 for HF, Ham, AB and 4/5 for Quad, Ad, DF, PF.  On L pt presents with 2/5 for HF, Quad, Ham, AB and 3/5 for Ad, and 4/5 for DF and PF.  Pt had difficulty following instructions for mm test.  Pt had gross functional endurance deficits with limited ability to stand >45sec before needing to sit.  Pt LLE fatigues quickly during step stance   ARC Assessment Only   Acute Rehab FIM See FIM scores for Mobility/ADL  Assessment   Balance   Balance Comments PT:  Pt demonstrates good static sitting balance, fait dynamic sitting balance and poor sit<>stand and standing balance.   Sensory Examination   Sensory Perception no deficits were identified   Coordination   Coordination Comments PT:  Pt demonstrate mild coordination deficits with slight delay and increased proceesing time.   Muscle Tone   Muscle Tone Comments PT:  Pt presents with low tone on L with no noted clonus or spacticity.   Modality Interventions   Planned Modality Interventions TENS;Cryotherapy   Planned Modality Interventions Comments PRN for pain management   General Therapy Interventions   Planned Therapy Interventions balance training;bed mobility training;gait training;manual therapy;neuromuscular re-education;ROM;strengthening;stretching;transfer training;wheelchair management/propulsion training;risk factor education;home program guidelines;progressive activity/exercise   Clinical Impression   Criteria for Skilled Therapeutic Intervention yes, treatment indicated   PT Diagnosis Muscle weakness and abnormality of gait and mobility   Influenced by the following impairments Muscle weakness, decreased functional endurance, decreased coordination, decreased balance, decreased cognition   Functional limitations due to impairments Limited bed mobility, transfers, gait, and stairs   Clinical Presentation Evolving/Changing   Clinical Presentation Rationale PT:  Pt will continue to change and progress on a daily basis as pt recovers from both cardio issues and CVA.   Clinical Decision Making (Complexity) High complexity   Therapy Frequency` 2 times/day   Predicted Duration of Therapy Intervention (days/wks) 14 days   Anticipated Equipment Needs at Discharge front wheeled walker   Anticipated Discharge Disposition Home with Home Therapy   Risk & Benefits of therapy have been explained Yes   Patient, Family & other staff in agreement with plan of care Yes   Total  Evaluation Time   Total Evaluation Time (Minutes) 35

## 2017-05-15 NOTE — PLAN OF CARE
Problem: Goal/Outcome  Goal: Goal Outcome Summary  FOCUS/GOAL  Bowel management and Caregiver training     ASSESSMENT, INTERVENTIONS AND CONTINUING PLAN FOR GOAL:  Pt alert, wife and daughter present. Educated on blood sugar management, bowel management, dawkins cares, preventing aspiration while on TF. Pt c/o stomach ache and did not make it to bathroom in time, incontinent BM x2. Accepted incontinence briefs after second episode and linens changed. States he never drinks milk and started hurting after finishing carton. Ate bites of salmon, rice and salad from family, and some of dinner from kitchen. Needs assist of 1 with walker and reminders to wait, otherwise pt starts to walk to bathroom without paying attention to dawkins or tube feeding. Pt wanted melatonin early which was ineffective, states he hasn't slept well since coming here, received orders for benadryl start with 25mg and can have another capsule if not helpful. Right upper chest right side glue coming up otherwise no drainage or redness noted this shift, approximated, left LILIAN. Alarms on.

## 2017-05-15 NOTE — PLAN OF CARE
Problem: Goal/Outcome  Goal: Goal Outcome Summary  Outcome: No Change  FOCUS/GOAL  Bowel management, Bladder management, Pain management and Medical management     ASSESSMENT, INTERVENTIONS AND CONTINUING PLAN FOR GOAL:  Pt's dawkins is patent and draining. Pt c/o pain caused by catheter, and upon assessment, writer found dawkins unsecured and threaded down wrong pant leg. Secured pt's catheter and threaded through correct pant leg. Pt stated relief of pain. LBM 5/15 with NOC shift. Pt had been having loose BMs per report so AM bowel meds were held. Continue to assess bowel status. Educated pt and family on BG testing, insulin, and fluid restriction with  present. Pt told writer this afternoon that he wants to have his NG and dawkins removed because they make therapy difficult, and he feels he can easily eat enough food and urinate on his own. Educated pt on why these interventions are in place, on calorie counts, and urinary retention. Paged MD to assess. Nursing to please follow-up. Pt c/o pain in his L thigh and was given tylenol x1 with some relief. Continue with POC.

## 2017-05-15 NOTE — PLAN OF CARE
Problem: Goal/Outcome  Goal: Goal Outcome Summary  Outcome: No Change  FOCUS/GOAL  Nutrition/Feeding/Swallowing precautions and Medical management     ASSESSMENT, INTERVENTIONS AND CONTINUING PLAN FOR GOAL:     Patient upset at start of shift as daughter had left the room and he did not know where she had gone. Gave patient second dose of Benadryl but patient unable to sleep. Daughter returned about 0200 from [facility] where she had gone. Tube feeding infusing without difficulty. HOB elevated. Patient stated he still could not sleep at 0500. Continue to monitor.

## 2017-05-15 NOTE — PLAN OF CARE
Problem: Goal/Outcome  Goal: Goal Outcome Summary  Completed memory recallt ask- reclal of 5 words that pt heard initially just read 2x each and then answering a question that related to 1 or more of the words- pt cued to use rehearsal and visualization to aid in recall- when hearing just 2x- pt able to recall 8/17-- however pt often asking a for a 3rd and 4th repetition to aid in recall. With a 2nd memory task- recall of paragraph lenght info that pt heard in English 2x and then in Faroese 2x- pt was able to recall with 100% accuracy ( 5/5 and 5/5)). Compelted a written proble solving task - organiziang info on a grid based on written directions- pt able to complete with occasional cueing to aid in divided attention demand of task. With verbal prolbe solving task- iding problem and then stating a solution- pt at 4/4 accuracy

## 2017-05-15 NOTE — PLAN OF CARE
Problem: Goal/Outcome  Goal: Goal Outcome Summary  OT: Working on functional mobility and coordination during ADL and other tasks. Pt making improvement. Pt will have a lot of assist at home from family especially with IADL. Pt's daughter reports the pt is more forgetful and repetitive with his questions since his stroke. Will continue to try to address functional cognition as able.

## 2017-05-15 NOTE — PROGRESS NOTES
Calorie Counts    4/12:  2 meals   536 kcal / 29 gm pro    4/13:  3 meals   810 kcal / 33 gm pro    4/14:  3 meals   895 kcal / 44 gm pro    Anette PHAM

## 2017-05-15 NOTE — PROGRESS NOTES
Acute Rehabilitation Unit  Internal Medicine - Daily Progress Note    Luke Henao MRN# [id number]   YOB: 1965 Age: 51 year old   Date of Admission: 5/12/2017  Date of Service: 5/15/2017         Assessment and Recmmendations:   Mr. Luke Henao is a 52 yo male with hx of smoking and DM admitted initially to [facility] on 3/26 with cardiogenic shock s/p PCI of RCA and placement of balloon pump, transferred to [facility] 2/2 inferior wall STEMI, now s/p placement of seven FEMI to LAD, LCx and RCA on 3/27 with post-procedure c/b recurrent cardiogenic shock, finally resolved s/p MitraClip placement on 5/1 2/2 severe ischemic MR, transferred to ARU on 5/12 for aggressive rehabilitation.      CAD, Ischemic cardiomyopathy, recurrent cardiogenic shock (resolved) - Developed resultant ischemic cardiomyopathy secondary to inferior wall STEMI and had 7 FEMI to LAD, LCx, RCA on 3/27. Change in EKG seen on 4/1 and repeat angio was performed showing LCx was  and was noted to be re-occluded (previously opened on 3/27). Despite PCI the patient continued to have refractory cardiogenic shock and inability to wean from the IABP. MitraClip placed on 5/1 for ischemic mitral reguritation. The IABP was slowly weaned post procedure. Echo 5/1 with inferior hypokinesis and est EF 30-35%. Discontinued IABP on 5/5.  Pt BP soft with concern for hypovolemia therefore Bumex DC'd 5/13 and Lisinopril dose decreased to 2.5 mg PO daily.  Denies cardiac complaints.  Weight 119 lb today from 118 lbs.  Clinically euvolemic. BP 90s-100s/60s-80s.  - PT/OT per PM & R  - Continue Lisinopril 2.5 mg BID, no increase today  - Daily weights, will readdress volume status daily. No Bumex today.  - Daily standing weights with strict I/O  - Continue Digoxin 125 mcg PO daily  - Continue Spironolactone 12.5 mg PO daily  - Continue FR 1.8 L per day.   - Continue Plavix x 1 year  - Beta blocker being held in the setting of recent heart failure  decompensation and will be added back as an outpatient.      Mitral regurg s/p MitraClip - Developed ischemic mitral regurgitation as a result of his STEMI. Due to refractory cardiogenic shock and inability to wean from IABP a MitraClip procedure was performed.   - Needs lifelong antiplatelet with plan to continue Plavix for 1 year for FEMI then change to ASA.      H/o CVA - CVA developed left hand weakness and pain on 4/29 and a stroke code was called. CTA head/neck showed no hemorrhage but new areas of low attenuation in b/l hemispheres. Neuro consulted.   - Continuing Plavix and statin for prevention.   - PT/OT per PM&R      Type 2 diabetes mellitus - HA1C 7.5 on admission. Initially on insulin gtt and transitioned to Lantus and Novolog sliding scale.  Current glucoses 118-178.    - Continue with Lantus 25 units qam   - Continue Novolog 1 unit per 15 g CHO with meals and snacks.  - Cont Novolog medium correction scale  - Monitor sugars TID before meals, qhs and 0200.      Urinary retention - No history of BPH. Patient require dawkins catheter for much of hospitalization. Discontinued several days prior to discharge and since has had intermittent retention likely secondary to prolonged dawkins.  Urinary retention note don 5/12 therefore dawkins reinserted per primary team.   - Could consider starting Flomax at some point, however, not currently given soft BP  - Continue Dawkins care   - Pt will need at least one week of bladder rest prior to trial void (~5/19).      Hx of acute DVT - During hospitalization pt developed a non-occlusive clot in left IJ and left common femoral vein. Started on Coumadin with bridging heparin gtt low intensity. DVT considered provoked in the setting of prolonged hospitalization and critical illness and therefore warfarin course should be ~3-6 months. INR goal 2-3, most recent subtherapeutic at 1.66. Lovenox started 5/14 for subtherapeutic INR.  - Pharmacy consulted to dose daily warfarin based  on serial INRs. Appreciate assistance.  - Continue Lovenox until INR gets back at goal between 2-3.   - F/u in warfarin clinic after discharge      Non-severe malnutrition -  Within in the context of acute illness. Started on TFs in hospital of which he is tolerating.  - Nutrition consulted and appreciate following, will follow calorie counts    - Continue TF via NDT   - Continue low Na diet       Sinus tachycardia - Persistent during hospitalization. No current evidence of infection. Likely from recovering cardiac function. Low 100s noted today.    - Continue to monitor.   - Will be started on a betablocker as an outpatient as cardiac function/heart failure continues to improve.      Hx of Lower GIB - Had several maroon BMs since 4/11, Hb overall dropped by a 1.5 grams or so, heparin infusion was held and GI consulted. Colonoscopy 4/17 showed a rectal ulcer thought secondary to retal tube but no intervenable lesions. Hgb remains stable and no recurrence of melena.  - Continue on Protonix 40 PO qD.      Insomnia -  Difficulty sleeping intermittently throughout admission. Improved.   - Melatonin qHS and Seroquel 12.5mg qHS.      Hypoxic respiratory failure, resolved - Initially presented intubated after STEMI. Extubated on 4/3. Re-intubated in the setting of worsening hypoxia on 4/25, extubated 4/28. Thought to be aspiration pneumonia/concomittant HCAP. Had already been treated with broad spectrum antibiotic starting 4/19. Completed 2 week course. Continued to improve and on O2 for comfort upon transfer. Currently denies SOB and O2 sats on RA WNL.      Urinary tract infection, resolved - UA 4/15 with cloudy urine, 50 WBCs, many bacteria, no nitrates. UCx grew E coli - treated with CFTX (sensitive). Fever on 4/20 and single spike 100.2 overnight with unremarkable blood cultures. Completed Vanc and pip/tazo treatment course on 5/1.     I personally examined Luke Henao today, and reviewed vital signs, medications and  pertinent labs or imaging. Thank you for this opportunity to be involved in your patient's care.   Recommendations reviewed with attending physician Dr. Burks and communicated to primary team via this note.     Chula Rascon, [facility]   Hospitalist Service   Pager: [phone]       Interval History:     No acute events overnight.  Has trouble sleeping because of his mind racing, states he cares for his family a lot.  Otherwise denies any physical concerns such as fever, chills, chest pain, SOB, nausea, abd pain, constipation, diarrhea, new skin rashes.  Voiding via dawkins cathter.  VSS.  Participating in therapy.             Review of Systems:   A 10 point ROS was performed and negative unless otherwise noted in HPI.           Medications:     Current Facility-Administered Medications   Medication     warfarin (COUMADIN) tablet 3 mg     enoxaparin (LOVENOX) injection 50 mg     pantoprazole (PROTONIX) EC tablet 40 mg     diphenhydrAMINE (BENADRYL) capsule 25-50 mg     lisinopril (PRINIVIL/Zestril) tablet 2.5 mg     insulin aspart (NovoLOG) inj (RAPID ACTING)     insulin aspart (NovoLOG) inj (RAPID ACTING)     senna-docusate (SENOKOT-S;PERICOLACE) 8.6-50 MG per tablet 2 tablet     bisacodyl (DULCOLAX) Suppository 10 mg     polyethylene glycol (MIRALAX/GLYCOLAX) Packet 17 g     acetaminophen (TYLENOL) tablet 650 mg     atorvastatin (LIPITOR) tablet 40 mg     clopidogrel (PLAVIX) tablet 75 mg     digoxin (LANOXIN) tablet 125 mcg     insulin glargine (LANTUS) injection 25 Units     melatonin tablet 3 mg     spironolactone (ALDACTONE) half-tab 12.5 mg     glucose 40 % gel 15-30 g    Or     dextrose 50 % injection 25-50 mL    Or     glucagon injection 1 mg     insulin aspart (NovoLOG) inj (RAPID ACTING)     insulin aspart (NovoLOG) inj (RAPID ACTING)     Warfarin Therapy Reminder (Check START DATE - warfarin may be starting in the FUTURE)     Patient is already receiving anticoagulation with heparin, enoxaparin (LOVENOX),  warfarin (COUMADIN)  or other anticoagulant medication     dextrose 10 % 1,000 mL infusion            Physical Exam:   Blood pressure 109/84, pulse 110, temperature 97.6  F (36.4  C), temperature source Oral, resp. rate 18, height 1.524 m (5'), weight 54.2 kg (119 lb 8 oz), SpO2 99 %.  Body mass index is 23.34 kg/(m^2).  GENERAL: Alert and oriented x 3. Laying in bed, pleasant.   HEENT: Anicteric sclera. PERRL. Mucous membranes moist and without lesions. NKT in place, no breakdown.  CV: RRR. S1, S2. No murmurs appreciated.   RESPIRATORY: Effort normal on RA. Lungs CTAB with no wheezing, rales, rhonchi.   GI: Abdomen soft and non distended, bowel sounds present. No tenderness, rebound, guarding. Chavis catheter in place draining clear, yellow urine.   MUSCULOSKELETAL: No joint swelling or tenderness. Moves all extremities.    NEUROLOGICAL: No focal deficits. Strength 5/5 bilaterally in upper and lower extremities.   EXTREMITIES: No peripheral edema. Intact bilateral pedal pulses.   SKIN: No jaundice. No rashes.         Data:   I personally reviewed the following studies:    ROUTINE IP LABS (Last four results)  CMP   Recent Labs  Lab 05/15/17  0700 05/12/17  0651 05/11/17  1933 05/11/17  0638  05/10/17  0623    138 136 135  < > 136   POTASSIUM 4.4 4.6 4.0 4.1  < > 3.9   CHLORIDE 108 104 99 98  < > 98   CO2 25 30 31 30  < > 31   ANIONGAP 7 4 6 7  < > 6   * 135* 170* 128*  < > 158*   BUN 23 23 26 28  < > 22   CR 0.68 0.74 0.81 0.86  < > 0.84   ROB 8.8 7.9* 8.1* 8.4*  < > 8.4*   MAG 1.8 2.3 2.2 2.0  < > 1.8   PHOS 4.4 3.1  --  3.6  --  4.2   < > = values in this interval not displayed.  CBC   Recent Labs  Lab 05/15/17  0700 05/12/17  0651 05/11/17  0638 05/10/17  0623   WBC 7.9 8.5 8.7 7.2   RBC 3.55* 3.31* 3.41* 3.19*   HGB 10.7* 9.8* 10.1* 9.3*   HCT 33.4* 32.0* 33.1* 31.0*   MCV 94 97 97 97   MCH 30.1 29.6 29.6 29.2   MCHC 32.0 30.6* 30.5* 30.0*   RDW 16.3* 17.3* 17.6* 18.0*    372 279 360     INR    Recent Labs  Lab 05/15/17  0700 05/14/17  0618 05/13/17  0529 05/12/17  0651   INR 1.66* 1.72* 2.01* 2.79*         Unresulted Labs Ordered in the Past 30 Days of this Admission     Date and Time Order Name Status Description    4/28/2017 0924 AFB Culture Non Blood Preliminary     4/27/2017 1139 AFB Culture Non Blood Preliminary     4/26/2017 1250 AFB Culture Non Blood -- BAL Site 1 Preliminary     4/26/2017 1021 Fungus Culture, non-blood - BAL Site 1 Preliminary

## 2017-05-15 NOTE — PROGRESS NOTES
PM&R Daily Note:    Will try dawkins out on Wed. He's anxious to get it out. Had some discomfort but better with re-securing the thigh strap.    FT continues, hugo counts still low 536, 810, 895. Continues with noc feeds. Can lower amount at night to help stimulate hunger in AM / next day. Go for 8 rather than 12 hours., still 60 mL per hour.    Right upper chest wound redressed today. Lower spot still with packing, a bit more shallow, non tender. Up higher is a horizontal incision with some glue that was peeling off. I removed remaining and base looks reasonable but small open area shallow. Covered with dry gauze.    Team rounds tomorrow to review progress and update POC.    Vitals:    05/15/17 0900 05/15/17 0926 05/15/17 0941 05/15/17 1537   BP: 109/84 117/81 (!) 129/91 101/75   BP Location: Right arm Right arm Right arm Left arm   Pulse: 110 109 118 110   Resp: 18   16   Temp: 97.6  F (36.4  C)   96.9  F (36.1  C)   TempSrc: Oral   Oral   SpO2: 99% 100% 99% 99%   Weight:       Height:         Chest wound areas as above  Heart RRR  NG tube in place  Dawkins with clear yellow urine.

## 2017-05-16 LAB
GLUCOSE BLDC GLUCOMTR-MCNC: 136 MG/DL (ref 70–99)
GLUCOSE BLDC GLUCOMTR-MCNC: 138 MG/DL (ref 70–99)
GLUCOSE BLDC GLUCOMTR-MCNC: 170 MG/DL (ref 70–99)
GLUCOSE BLDC GLUCOMTR-MCNC: 89 MG/DL (ref 70–99)
GLUCOSE BLDC GLUCOMTR-MCNC: 98 MG/DL (ref 70–99)
INR PPP: 2 (ref 0.86–1.14)

## 2017-05-16 PROCEDURE — 40000133 ZZH STATISTIC OT WARD VISIT: Performed by: STUDENT IN AN ORGANIZED HEALTH CARE EDUCATION/TRAINING PROGRAM

## 2017-05-16 PROCEDURE — 27210436 ZZH NUTRITION PRODUCT SEMIELEM INTERMED CAN

## 2017-05-16 PROCEDURE — 12800006 ZZH R&B REHAB

## 2017-05-16 PROCEDURE — 97532 ZZHC SP COGNITIVE SKILLS EA 15 MIN: CPT | Mod: GN | Performed by: SPEECH-LANGUAGE PATHOLOGIST

## 2017-05-16 PROCEDURE — 97535 SELF CARE MNGMENT TRAINING: CPT | Mod: GO | Performed by: STUDENT IN AN ORGANIZED HEALTH CARE EDUCATION/TRAINING PROGRAM

## 2017-05-16 PROCEDURE — 25000132 ZZH RX MED GY IP 250 OP 250 PS 637: Performed by: PHYSICAL MEDICINE & REHABILITATION

## 2017-05-16 PROCEDURE — 40000193 ZZH STATISTIC PT WARD VISIT

## 2017-05-16 PROCEDURE — 25000132 ZZH RX MED GY IP 250 OP 250 PS 637: Performed by: PHYSICIAN ASSISTANT

## 2017-05-16 PROCEDURE — 97530 THERAPEUTIC ACTIVITIES: CPT | Mod: GP

## 2017-05-16 PROCEDURE — 97116 GAIT TRAINING THERAPY: CPT | Mod: GP

## 2017-05-16 PROCEDURE — 25000128 H RX IP 250 OP 636: Performed by: PHYSICIAN ASSISTANT

## 2017-05-16 PROCEDURE — 36415 COLL VENOUS BLD VENIPUNCTURE: CPT | Performed by: PHYSICIAN ASSISTANT

## 2017-05-16 PROCEDURE — 40000225 ZZH STATISTIC SLP WARD VISIT: Performed by: SPEECH-LANGUAGE PATHOLOGIST

## 2017-05-16 PROCEDURE — 99212 OFFICE O/P EST SF 10 MIN: CPT

## 2017-05-16 PROCEDURE — 99232 SBSQ HOSP IP/OBS MODERATE 35: CPT | Performed by: NURSE PRACTITIONER

## 2017-05-16 PROCEDURE — 85610 PROTHROMBIN TIME: CPT | Performed by: PHYSICIAN ASSISTANT

## 2017-05-16 PROCEDURE — 00000146 ZZHCL STATISTIC GLUCOSE BY METER IP

## 2017-05-16 PROCEDURE — T1013 SIGN LANG/ORAL INTERPRETER: HCPCS | Mod: U3

## 2017-05-16 PROCEDURE — 97530 THERAPEUTIC ACTIVITIES: CPT | Mod: GO | Performed by: STUDENT IN AN ORGANIZED HEALTH CARE EDUCATION/TRAINING PROGRAM

## 2017-05-16 RX ORDER — BUMETANIDE 0.5 MG/1
0.5 TABLET ORAL
Status: DISCONTINUED | OUTPATIENT
Start: 2017-05-16 | End: 2017-05-26 | Stop reason: HOSPADM

## 2017-05-16 RX ORDER — WARFARIN SODIUM 3 MG/1
3 TABLET ORAL
Status: COMPLETED | OUTPATIENT
Start: 2017-05-16 | End: 2017-05-16

## 2017-05-16 RX ADMIN — LISINOPRIL 2.5 MG: 2.5 TABLET ORAL at 09:31

## 2017-05-16 RX ADMIN — DIGOXIN 125 MCG: 125 TABLET ORAL at 09:30

## 2017-05-16 RX ADMIN — WARFARIN SODIUM 3 MG: 3 TABLET ORAL at 17:33

## 2017-05-16 RX ADMIN — ENOXAPARIN SODIUM 50 MG: 60 INJECTION SUBCUTANEOUS at 09:31

## 2017-05-16 RX ADMIN — Medication 12.5 MG: at 09:31

## 2017-05-16 RX ADMIN — CLOPIDOGREL BISULFATE 75 MG: 75 TABLET, FILM COATED ORAL at 09:30

## 2017-05-16 RX ADMIN — ACETAMINOPHEN 650 MG: 325 TABLET ORAL at 10:12

## 2017-05-16 RX ADMIN — POLYETHYLENE GLYCOL 3350 17 G: 17 POWDER, FOR SOLUTION ORAL at 17:34

## 2017-05-16 RX ADMIN — ACETAMINOPHEN 650 MG: 325 TABLET ORAL at 14:22

## 2017-05-16 RX ADMIN — PANTOPRAZOLE SODIUM 40 MG: 20 TABLET, DELAYED RELEASE ORAL at 09:30

## 2017-05-16 RX ADMIN — ATORVASTATIN CALCIUM 40 MG: 40 TABLET, FILM COATED ORAL at 20:37

## 2017-05-16 RX ADMIN — DIPHENHYDRAMINE HYDROCHLORIDE 25 MG: 25 CAPSULE ORAL at 16:34

## 2017-05-16 RX ADMIN — INSULIN GLARGINE 25 UNITS: 100 INJECTION, SOLUTION SUBCUTANEOUS at 09:32

## 2017-05-16 RX ADMIN — SENNOSIDES AND DOCUSATE SODIUM 2 TABLET: 8.6; 5 TABLET ORAL at 20:37

## 2017-05-16 RX ADMIN — LISINOPRIL 2.5 MG: 2.5 TABLET ORAL at 20:36

## 2017-05-16 RX ADMIN — DIPHENHYDRAMINE HYDROCHLORIDE 25 MG: 25 CAPSULE ORAL at 20:37

## 2017-05-16 NOTE — PLAN OF CARE
Problem: Goal/Outcome  Goal: Goal Outcome Summary  Outcome: No Change  FOCUS/GOAL  Nutrition/Feeding/Swallowing precautions, Pain management, Wound care management and Skin integrity     ASSESSMENT, INTERVENTIONS AND CONTINUING PLAN FOR GOAL  Pain on the left leg with some relief from Tylenol , NJT intact,  patent orderwas changed from 1800 to 0200 , HOB is up when TF is running , told pt the importance to stay on his side to promote healing of the buttocks wound .

## 2017-05-16 NOTE — PROGRESS NOTES
Acute Rehabilitation Unit  Internal Medicine - Daily Progress Note    Luke Henao MRN# [id number]   YOB: 1965 Age: 51 year old   Date of Admission: 5/12/2017         Assessment and Recmmendations:   Mr. Luke Henao is a 50 yo male with hx of smoking and DM admitted initially to [facility] on 3/26 with cardiogenic shock s/p PCI of RCA and placement of balloon pump, transferred to [facility] 2/2 inferior wall STEMI, now s/p placement of seven FEMI to LAD, LCx and RCA on 3/27 with post-procedure c/b recurrent cardiogenic shock, finally resolved s/p MitraClip placement on 5/1 2/2 severe ischemic MR, transferred to ARU on 5/12 for aggressive rehabilitation.      CAD, Ischemic cardiomyopathy, recurrent cardiogenic shock (resolved) - Developed resultant ischemic cardiomyopathy secondary to inferior wall STEMI and had 7 FEMI to LAD, LCx, RCA on 3/27. Change in EKG seen on 4/1 and repeat angio was performed showing LCx was  and was noted to be re-occluded (previously opened on 3/27). Despite PCI the patient continued to have refractory cardiogenic shock and inability to wean from the IABP. MitraClip placed on 5/1 for ischemic mitral reguritation. The IABP was slowly weaned post procedure. Echo 5/1 with inferior hypokinesis and est EF 30-35%. Discontinued IABP on 5/5.  Pt BP soft with concern for hypovolemia therefore Bumex DC'd 5/13 and Lisinopril dose decreased to 2.5 mg PO daily.  Denies cardiac complaints.  Weight up today to 122 lbs, BP stable 100s-110s.  - Restart Bumex 0.5 mg PO BID, assess response  - PT/OT per PM & R  - Continue Lisinopril 2.5 mg BID, no increase today  - Daily weights, will readdress volume status daily. No Bumex today.  - Daily standing weights with strict I/O  - Continue Digoxin 125 mcg PO daily  - Continue Spironolactone 12.5 mg PO daily  - Continue FR 1.8 L per day.   - Continue Plavix x 1 year  - Beta blocker being held in the setting of recent heart failure decompensation  and will be added back as an outpatient.      Mitral regurg s/p MitraClip - Developed ischemic mitral regurgitation as a result of his STEMI. Due to refractory cardiogenic shock and inability to wean from IABP a MitraClip procedure was performed.   - Needs lifelong antiplatelet with plan to continue Plavix for 1 year for FEMI then change to ASA.      H/o CVA - CVA developed left hand weakness and pain on 4/29 and a stroke code was called. CTA head/neck showed no hemorrhage but new areas of low attenuation in b/l hemispheres. Neuro consulted.   - Continuing Plavix and statin for prevention.   - PT/OT per PM&R      Type 2 diabetes mellitus - HA1C 7.5 on admission. Initially on insulin gtt and transitioned to Lantus and Novolog sliding scale.  Current glucoses 118-178.    - Continue with Lantus 25 units qam   - Continue Novolog 1 unit per 15 g CHO with meals and snacks.  - Cont Novolog medium correction scale  - Monitor sugars TID before meals, qhs and 0200.      Urinary retention - No history of BPH. Patient require dawkins catheter for much of hospitalization. Discontinued several days prior to discharge and since has had intermittent retention likely secondary to prolonged dawkins.  Urinary retention note don 5/12 therefore dawkins reinserted per primary team.   - Could consider starting Flomax at some point, however, not currently given soft BP  - Continue Dawkins care   - Pt will need at least one week of bladder rest prior to trial void (~5/19).      Hx of acute DVT - During hospitalization pt developed a non-occlusive clot in left IJ and left common femoral vein. Started on Coumadin with bridging heparin gtt low intensity. DVT considered provoked in the setting of prolonged hospitalization and critical illness and therefore warfarin course should be ~3-6 months. INR goal 2-3, most recent subtherapeutic at 1.66. Lovenox started 5/14 for subtherapeutic INR.  - Pharmacy consulted to dose daily warfarin based on serial INRs.  Appreciate assistance.  - Continue Lovenox until INR gets back at goal between 2-3.   - F/u in warfarin clinic after discharge      Non-severe malnutrition -  Within in the context of acute illness. Started on TFs in hospital of which he is tolerating.  - Nutrition consulted and appreciate following, will follow calorie counts    - Continue TF via NDT   - Continue low Na diet       Sinus tachycardia - Persistent during hospitalization. No current evidence of infection. Likely from recovering cardiac function. Low 100s noted today.    - Continue to monitor.   - Will be started on a betablocker as an outpatient as cardiac function/heart failure continues to improve.      Hx of Lower GIB - Had several maroon BMs since 4/11, Hb overall dropped by a 1.5 grams or so, heparin infusion was held and GI consulted. Colonoscopy 4/17 showed a rectal ulcer thought secondary to retal tube but no intervenable lesions. Hgb remains stable and no recurrence of melena.  - Continue on Protonix 40 PO qD.      Insomnia -  Difficulty sleeping intermittently throughout admission. Improved.   - Melatonin qHS and Seroquel 12.5mg qHS.      Hypoxic respiratory failure, resolved - Initially presented intubated after STEMI. Extubated on 4/3. Re-intubated in the setting of worsening hypoxia on 4/25, extubated 4/28. Thought to be aspiration pneumonia/concomittant HCAP. Had already been treated with broad spectrum antibiotic starting 4/19. Completed 2 week course. Continued to improve and on O2 for comfort upon transfer. Currently denies SOB and O2 sats on RA WNL.      Urinary tract infection, resolved - UA 4/15 with cloudy urine, 50 WBCs, many bacteria, no nitrates. UCx grew E coli - treated with CFTX (sensitive). Fever on 4/20 and single spike 100.2 overnight with unremarkable blood cultures. Completed Vanc and pip/tazo treatment course on 5/1.     I personally examined Luke Henao today, and reviewed vital signs, medications and pertinent labs or  imaging. Thank you for this opportunity to be involved in your patient's care.   Recommendations reviewed with attending physician Dr. Burks and communicated to primary team via this note.     Chula Rascon, [facility]   Hospitalist Service   Pager: [phone]       Interval History:     No acute events overnight. Slept well overnight.  Complains of pain in his R thigh but he feels it is a sore muscle.  Otherwise, denies fever, chills, NVD, chest pain, SOB or any other physical complaints.  No BM in 2 days.  Voiding via dawkins catheter.           Review of Systems:   A 10 point ROS was performed and negative unless otherwise noted in HPI.           Medications:     Current Facility-Administered Medications   Medication     warfarin (COUMADIN) tablet 3 mg     pantoprazole (PROTONIX) EC tablet 40 mg     diphenhydrAMINE (BENADRYL) capsule 25-50 mg     lisinopril (PRINIVIL/Zestril) tablet 2.5 mg     insulin aspart (NovoLOG) inj (RAPID ACTING)     insulin aspart (NovoLOG) inj (RAPID ACTING)     senna-docusate (SENOKOT-S;PERICOLACE) 8.6-50 MG per tablet 2 tablet     bisacodyl (DULCOLAX) Suppository 10 mg     polyethylene glycol (MIRALAX/GLYCOLAX) Packet 17 g     acetaminophen (TYLENOL) tablet 650 mg     atorvastatin (LIPITOR) tablet 40 mg     clopidogrel (PLAVIX) tablet 75 mg     digoxin (LANOXIN) tablet 125 mcg     insulin glargine (LANTUS) injection 25 Units     melatonin tablet 3 mg     spironolactone (ALDACTONE) half-tab 12.5 mg     glucose 40 % gel 15-30 g    Or     dextrose 50 % injection 25-50 mL    Or     glucagon injection 1 mg     insulin aspart (NovoLOG) inj (RAPID ACTING)     insulin aspart (NovoLOG) inj (RAPID ACTING)     Warfarin Therapy Reminder (Check START DATE - warfarin may be starting in the FUTURE)     Patient is already receiving anticoagulation with heparin, enoxaparin (LOVENOX), warfarin (COUMADIN)  or other anticoagulant medication     dextrose 10 % 1,000 mL infusion            Physical Exam:   Blood  pressure 110/80, pulse 109, temperature 97.7  F (36.5  C), temperature source Oral, resp. rate 16, height 1.524 m (5'), weight 55.5 kg (122 lb 4.8 oz), SpO2 98 %.  Body mass index is 23.89 kg/(m^2).  GENERAL: Alert and oriented x 3. Laying in bed, pleasant.   HEENT: Anicteric sclera. PERRL. Mucous membranes moist and without lesions. NKT in place, no breakdown.  CV: RRR. S1, S2. No murmurs appreciated.   RESPIRATORY: Effort normal on RA. Lungs CTAB with no wheezing, rales, rhonchi.   GI: Abdomen soft and non distended, bowel sounds present. No tenderness, rebound, guarding. Chavis catheter in place draining clear, yellow urine.   MUSCULOSKELETAL: No joint swelling or tenderness. Moves all extremities.    NEUROLOGICAL: No focal deficits. Strength 5/5 bilaterally in upper and lower extremities.   EXTREMITIES: No peripheral edema. Intact bilateral pedal pulses.   SKIN: No jaundice. No rashes.         Data:   I personally reviewed the following studies:    ROUTINE IP LABS (Last four results)  CMP   Recent Labs  Lab 05/15/17  0700 05/12/17  0651 05/11/17  1933 05/11/17  0638  05/10/17  0623    138 136 135  < > 136   POTASSIUM 4.4 4.6 4.0 4.1  < > 3.9   CHLORIDE 108 104 99 98  < > 98   CO2 25 30 31 30  < > 31   ANIONGAP 7 4 6 7  < > 6   * 135* 170* 128*  < > 158*   BUN 23 23 26 28  < > 22   CR 0.68 0.74 0.81 0.86  < > 0.84   ROB 8.8 7.9* 8.1* 8.4*  < > 8.4*   MAG 1.8 2.3 2.2 2.0  < > 1.8   PHOS 4.4 3.1  --  3.6  --  4.2   < > = values in this interval not displayed.  CBC     Recent Labs  Lab 05/15/17  0700 05/12/17  0651 05/11/17  0638 05/10/17  0623   WBC 7.9 8.5 8.7 7.2   RBC 3.55* 3.31* 3.41* 3.19*   HGB 10.7* 9.8* 10.1* 9.3*   HCT 33.4* 32.0* 33.1* 31.0*   MCV 94 97 97 97   MCH 30.1 29.6 29.6 29.2   MCHC 32.0 30.6* 30.5* 30.0*   RDW 16.3* 17.3* 17.6* 18.0*    372 279 360     INR     Recent Labs  Lab 05/16/17  0830 05/15/17  0700 05/14/17  0618 05/13/17  0529   INR 2.00* 1.66* 1.72* 2.01*          Unresulted Labs Ordered in the Past 30 Days of this Admission     Date and Time Order Name Status Description    4/28/2017 0924 AFB Culture Non Blood Preliminary     4/27/2017 1139 AFB Culture Non Blood Preliminary     4/26/2017 1250 AFB Culture Non Blood -- BAL Site 1 Preliminary     4/26/2017 1021 Fungus Culture, non-blood - BAL Site 1 Preliminary

## 2017-05-16 NOTE — PROGRESS NOTES
CALORIE COUNT:    5/15: 1 meal - Per pt via  only ate salmon and 1 cup rice soup.   Calories: 350 kcals  Protein: 27 g PRO    Lucía Bird, Registration Eligible   Nutrition Services

## 2017-05-16 NOTE — PLAN OF CARE
Problem: Goal/Outcome  Goal: Goal Outcome Summary  Pt improved some with memory recall tasks today- repeittion still of 3-4 times but at paragraph level pt at 5/6 accuracy; With recall of 4-5 items and then answering questions related to items- pt at 75% accuracy.  Improved with divided attention to detail and re-reading for accuracy and self- correction.

## 2017-05-16 NOTE — PLAN OF CARE
Acute Rehab Care Conference/Team Rounds      Type: Team Rounds    Present: Dr. Flo Chacon, Sandra Lowe PA, Poornima Maria SW, Carla Garcia OT, Juan Abraham PT, Yue Bird SLP,  Vivi Whelan , Rema Yang Dietician, Ruth Good RN        Discharge Barriers/Treatment/Education    Rehab Diagnosis: stroke and STEMI    Active Medical Co-morbidities/Prognosis: CAD, HTN, DM    Safety: Patient called for assistance as needed. He speaks minimal English, needs  services. Family member at bedside. He transfers and ambulated with a walker and assist of 1. Bed alarm, he tends to be close to the edge of the bed.    Pain: Pain coccyx area at the the ulcer site, relief with side lying position. Pain and numbness left upper thigh, relief with tylenol and warm packs.    Medications, Skin, Tubes/Lines: Pressure ulcer on coccyx, covered with mepilex. Right upper chest wound, packing dressing. Nasoenteric tube for tube feedings from 1800 to 0200. Dawkins patent and draining.    Swallowing/Nutrition:Tolerating a regular diet with thin liquids.     Bowel/Bladder: He has a dawkins catheter. Continent of BM. LBM on 5/15, used toilet.    Psychosocial: Pt resides with his wife. Goal to return home with continued support from family.    ADLs/IADLs: Set up assist/CGA with ADL however pt's family has been assisting him as needed. Pt using walker for mobility. Tub transfer with MIN A/CGA for stepping over however recommend he get GB and shower chair which he can use to assist with transfer. Ataxia affecting amb and transfer. Working towards goals of MOD I. Pt will have family support at home and assist with IADL. Interpretor has not always been present so we have not done much regarding functional cognition. Family reports his memory is not as good and he can be repetitive. May need oversight with medication set up and other IADL. Recommend  OT.     Mobility: Pt progressing fairly  "well with mobility - demo sup<>sit SBA, sit<>stand with FWW at close SBA, ambulating 185' without AD at Marce to correct R foot drag and trunk ataxia intermittently, and navigating 10x6\" steps with B rails at Marce. Need to progress independence with amb, working on amb without AD for improved trunk control but will need FWW for assist to maintain balance at d/c, goal to be mod I in room with FWW prior to d/c home. Recommend HH PT at d/c.     Cognition/Language: Pt with mild - moderate cognitive deficits in memory, reasoning and attention. Pt is demonstrating some progress with recall when using strategies of rehearsal ( needs repetition of 3-4 times for 4- 5 items) and visualization. Pt also noted to be improved with overall attention to detail and improved processing time when completing written reasoning tasks. Pt will need oversight with med management likely and finances and recommend HH sptx following d/c to continue to work on cognition for increased safety in IADL's    Community Re-Entry: will be able to ambulate limited community distances but will need assist from w/c to navigate full community distances 1000'+.    Transportation: will require assist at d/c.    Decision maker: self    Plan of Care and goals reviewed and updated.    Discharge Plan/Recommendations    Fall Precautions: continue    Overall plan for the patient: showing some progress. PO picking up some but not at target to nasal feedings continue to augment. Hope FT out by end of week. Balance and coordination showing some gains. Some education given on home equipment. Some inconsistent  services in person so resorting to Ipad which is sub optimal. cognition seeming to be improving as well but some deficits with insights.  Looking at needing another about 10 days for d/c. Plan CC next Tue after d/c. Ongoing therapy TBD.      Utilization Review and Continued Stay Justification    Medical Necessity Criteria:    For any criteria that is " not met, please document reason and plan for discharge, transfer, or modification of plan of care to address.    Requires intensive rehabilitation program to treat functional deficits?: Yes  Requires 3x per week or greater involvement of rehabilitation physician to oversee rehabilitation program?: Yes  Requires rehabilitation nursing interventions?: Yes  Patient is making functional progress?: Yes  There is a potential for additional functional progress? Yes  Patient is participating in therapy 3 hours per day a minimum of 5 days per week or 15 hours per week in 7 day period?:Yes  Has discharge needs that require coordinated discharge planning approach?:Yes      Final Physician Sign off    Statement of Approval: I agree with all the recommendations detailed in this document.      Patient Goals     1. Pt will d/c home/community setting upon completion of therapy/RN goals.  2. Pt and family will be involved in d/c planning and will be made aware of services available to meet pts needs.           OT goal: hygiene/grooming: modified independent  OT goal: upper body dressing: Modified independent  OT goal: lower body dressing: Modified independent  OT goal: upper body bathing: Modified independent  OT goal: lower body bathing: Modified independent  OT goal: bed mobility: Modified independent  OT Goal: transfer: Modified independent  OT goal: toilet transfer/toileting: Modified independent  OT goal: meal preparation: with simple meal preparation, ambulatory level  OT goal: home management: with light demand household tasks, ambulatory level  OT goal: cognitive: Patient/caregiver will verbalize understanding of cognitive assessment results/recommendations as needed for safe discharge planning  OT/LILIAN face-to-face collaboration occurred, patient progress and plan of care reviewed.    PT target date for goal attainment: 05/28/17  PT Frequency: 2/day for 60-75 min/day  PT goal: bed mobility: Independent  PT goal: transfers:  Modified independent, Assistive device  PT goal: gait: Modified independent, 150 feet, Rolling walker  PT goal: stairs: 2 stairs, Rail on both sides, Supervision/stand-by assist  PT goal: perform aerobic activity with stable cardiovascular response: 15 minutes, continuous activity  PT goal 1: Pt will be able to demonstrate safe car transfer at SBA level for community mobility  PT Goal 2: Pt will demonstrate TUG score <14 sec in order to demonstrate decreased falls risk.  PT Goal 3: Pt will be Ind with HEP for continued improvement at d/c  PT Goal 4: Pt will demonstrate safe floor transfer in case of fall in home    SLP target date for goal attainment: 05/28/17  SLP Frequency: daily  SLP goal 1: Pt will complete mod to complex auditory comprehension tasks with 90% accuracy  SLP goal 2: Pt will utilize compensatory attention and memory strategies to complete mod level recall/new learning and divided attention tasks with 90% accuracy  SLP goal 3: Pt will complete mod level reasoning/ problem solving tasks with 90% accuracy      Nursing Goal: Patient and family will demonstrate understanding on signs and symptoms of infection before discharge.  Nursing Goal: Patient and family will demonstrate understanding on treatments before discharge.  Nursing Goal: Patient will demonstrate understanding on adequate intake before discharge.  Nursing Goal: Patient will remain free of infection before discharge.

## 2017-05-16 NOTE — PLAN OF CARE
Problem: Goal/Outcome  Goal: Goal Outcome Summary  Outcome: No Change  FOCUS/GOAL  Bladder management, Pain management and Medical management     ASSESSMENT, INTERVENTIONS AND CONTINUING PLAN FOR GOAL:  Pt's dawkins is patent and draining. Per Rounds, dawkins will be removed tomorrow 5/17 for voiding trial. See Rounds note for more information. LBM 5/15. Pt c/o L thigh pain and was given tylenol x2 and heat packs with some relief. Lovenox was discontinued. Carb coverage has been difficult to provide d/t pt having minimal appetite as well as pt being unable to report what/how much food from home he ate. Notified MD of this during Rounds. He plans to follow-up. Continue to reinforce education regarding importance of regulating blood sugars. Continue with POC.

## 2017-05-16 NOTE — PROGRESS NOTES
[facility]  WO Nurse Inpatient Adult Pressure INJURY (PI) Wound Assessment     Follow up assessment of PU(s) on pt's:   Coccyx    Data:   Patient History:      Per MD note(s): 51 year old man presented with cardiogenic shock from inferior STEMI s/p RCA aspiration thrombectomy and POBA on 3/26 at [facility] s/p IABP and initiation of Dopamine, also during procedure, CHB s/p temp pacer, emesis/hematmesis and altered mental status s/p intubation transferred here for consideration of mechanical support on 3/27. Had PCI to RCA, LAD and LCx (on ASA and plavix) started on epinephrine in addition to dopamine, post procedure developed distributive shock picture from infection (aspiration pneumonia? Sputum cx growing staph aureus, on vanco and zosyn) vs post-MI SIRS response. Currently in cardiogenic shock with IABP in situ.    Patient now on Acute Rehab for ongoing therapies.      Current mattress: Atmos Air  Current pressure relieving devices:  Pillows, Z flow chair positioner, Geomat cushion    Moisture Management:  Incontinence Protocol    Current Diet / Nutrition:       Active Diet Order      3 Gram Sodium Diet    Tube Feeding:   John Score  Av.3  Min: 10  Max: 20   Recent Labs   Lab Test  17   0651   17   0404   17   0359   ALBUMIN   --    --   2.4*   < >  2.2*   HGB  9.8*   < >  7.6*   < >  7.6*   INR  2.79*   < >  2.22*   < >   --    WBC  8.5   < >  6.8   < >  8.5   A1C   --    --    --    --   Below Assay Range   Canceled, Test credited  EMILIE LAW RN @ 1014 17 KLA     CRP   --    --    --    --   17.0*    < > = values in this interval not displayed.                                                                                                Pressure Injury Assessment  (location #1):   Coccyx    Wound History:   Coccyx pressure injury stable on assessment today. Now a full thickness Stage 3 pressure injury.        Photo 17        Photo Coccyx 17                      "                        Photo, coccyx 4/26/17                                                                                                    Wound Base: Pressure Injury is full thickness, stable in size and depth. Periwound skin improving.  Specific Dimensions (length x width x depth, in cm) :   3 x 1 x 0.2 cm    Palpation of the wound bed:  Bone is not palpable    Slough appearance:  none    Periwound Skin: intact,      Color: normal and consistent with surrounding tissue    Temperature  normal     Drainage:    Amount: scant,  Color: serous       Odor: none    Pain: Patient does state the area is \"sore\" and is independent turing side to side.         Intervention:     Patient's chart evaluated.      John Interventions:  Current John Interventions and Care Plan reviewed and updated, appropriate at this time.    Wound was assessed.    Wound Care: was done:     Visual inspection    Cleansing with NS or Microklenz    Application of Mepilex dressing with Coloplast Triad Barrier at open wound    Orders  Reviewed    Supplies  N/A    Discussed plan of care with Nurse and daughter    Encourage use of Geomat cushion and frequent repositioning.           Assessment:     Pressure Injury (PI) located on Coccyx: 3    Status: continues to evolve, wound consists of 2 open areas with a dermal bridge between             Plan:     Nursing to notify the Provider(s) and re-consult the WOC Nurse if wound(s) deteriorate(s).  Plan of care for wound located on Coccyx: Clean wound with Microklenz, Apply a small amount of Coloplast Triad Barrier directly to wound, Cover with Mepilex dressing changer daily  WOC Nurse will return: weekly  Face to face time: 20 minutes    "

## 2017-05-17 LAB
GLUCOSE BLDC GLUCOMTR-MCNC: 106 MG/DL (ref 70–99)
GLUCOSE BLDC GLUCOMTR-MCNC: 119 MG/DL (ref 70–99)
GLUCOSE BLDC GLUCOMTR-MCNC: 125 MG/DL (ref 70–99)
GLUCOSE BLDC GLUCOMTR-MCNC: 159 MG/DL (ref 70–99)
GLUCOSE BLDC GLUCOMTR-MCNC: 199 MG/DL (ref 70–99)
INR PPP: 2.48 (ref 0.86–1.14)

## 2017-05-17 PROCEDURE — 25000132 ZZH RX MED GY IP 250 OP 250 PS 637: Performed by: PHYSICAL MEDICINE & REHABILITATION

## 2017-05-17 PROCEDURE — 97532 ZZHC SP COGNITIVE SKILLS EA 15 MIN: CPT | Mod: GN | Performed by: SPEECH-LANGUAGE PATHOLOGIST

## 2017-05-17 PROCEDURE — 00000146 ZZHCL STATISTIC GLUCOSE BY METER IP

## 2017-05-17 PROCEDURE — 97535 SELF CARE MNGMENT TRAINING: CPT | Mod: GO

## 2017-05-17 PROCEDURE — 25000132 ZZH RX MED GY IP 250 OP 250 PS 637: Performed by: NURSE PRACTITIONER

## 2017-05-17 PROCEDURE — 40000193 ZZH STATISTIC PT WARD VISIT

## 2017-05-17 PROCEDURE — 97116 GAIT TRAINING THERAPY: CPT | Mod: GP

## 2017-05-17 PROCEDURE — 40000193 ZZH STATISTIC PT WARD VISIT: Performed by: PHYSICAL THERAPIST

## 2017-05-17 PROCEDURE — 40000225 ZZH STATISTIC SLP WARD VISIT: Performed by: SPEECH-LANGUAGE PATHOLOGIST

## 2017-05-17 PROCEDURE — 97112 NEUROMUSCULAR REEDUCATION: CPT | Mod: GP | Performed by: PHYSICAL THERAPIST

## 2017-05-17 PROCEDURE — 99232 SBSQ HOSP IP/OBS MODERATE 35: CPT | Performed by: NURSE PRACTITIONER

## 2017-05-17 PROCEDURE — 36415 COLL VENOUS BLD VENIPUNCTURE: CPT | Performed by: PHYSICIAN ASSISTANT

## 2017-05-17 PROCEDURE — 25000132 ZZH RX MED GY IP 250 OP 250 PS 637: Performed by: PHYSICIAN ASSISTANT

## 2017-05-17 PROCEDURE — 97116 GAIT TRAINING THERAPY: CPT | Mod: GP | Performed by: PHYSICAL THERAPIST

## 2017-05-17 PROCEDURE — 12800006 ZZH R&B REHAB

## 2017-05-17 PROCEDURE — 97110 THERAPEUTIC EXERCISES: CPT | Mod: GO

## 2017-05-17 PROCEDURE — T1013 SIGN LANG/ORAL INTERPRETER: HCPCS | Mod: U3

## 2017-05-17 PROCEDURE — 25000131 ZZH RX MED GY IP 250 OP 636 PS 637: Performed by: PHYSICIAN ASSISTANT

## 2017-05-17 PROCEDURE — 40000133 ZZH STATISTIC OT WARD VISIT

## 2017-05-17 PROCEDURE — 85610 PROTHROMBIN TIME: CPT | Performed by: PHYSICIAN ASSISTANT

## 2017-05-17 PROCEDURE — 97112 NEUROMUSCULAR REEDUCATION: CPT | Mod: GP

## 2017-05-17 PROCEDURE — 27210436 ZZH NUTRITION PRODUCT SEMIELEM INTERMED CAN

## 2017-05-17 RX ORDER — WARFARIN SODIUM 3 MG/1
3 TABLET ORAL
Status: COMPLETED | OUTPATIENT
Start: 2017-05-17 | End: 2017-05-17

## 2017-05-17 RX ORDER — ALUMINA, MAGNESIA, AND SIMETHICONE 2400; 2400; 240 MG/30ML; MG/30ML; MG/30ML
30 SUSPENSION ORAL EVERY 4 HOURS PRN
Status: DISCONTINUED | OUTPATIENT
Start: 2017-05-17 | End: 2017-05-25

## 2017-05-17 RX ADMIN — SENNOSIDES AND DOCUSATE SODIUM 2 TABLET: 8.6; 5 TABLET ORAL at 08:28

## 2017-05-17 RX ADMIN — ACETAMINOPHEN 650 MG: 325 TABLET ORAL at 10:29

## 2017-05-17 RX ADMIN — Medication 12.5 MG: at 08:27

## 2017-05-17 RX ADMIN — ATORVASTATIN CALCIUM 40 MG: 40 TABLET, FILM COATED ORAL at 20:43

## 2017-05-17 RX ADMIN — INSULIN ASPART 1 UNITS: 100 INJECTION, SOLUTION INTRAVENOUS; SUBCUTANEOUS at 09:35

## 2017-05-17 RX ADMIN — WARFARIN SODIUM 3 MG: 3 TABLET ORAL at 17:32

## 2017-05-17 RX ADMIN — SENNOSIDES AND DOCUSATE SODIUM 2 TABLET: 8.6; 5 TABLET ORAL at 20:43

## 2017-05-17 RX ADMIN — BUMETANIDE 0.5 MG: 0.5 TABLET ORAL at 08:26

## 2017-05-17 RX ADMIN — ALUMINUM HYDROXIDE, MAGNESIUM HYDROXIDE, AND DIMETHICONE 30 ML: 400; 400; 40 SUSPENSION ORAL at 17:32

## 2017-05-17 RX ADMIN — DIGOXIN 125 MCG: 125 TABLET ORAL at 08:30

## 2017-05-17 RX ADMIN — INSULIN GLARGINE 25 UNITS: 100 INJECTION, SOLUTION SUBCUTANEOUS at 08:38

## 2017-05-17 RX ADMIN — CLOPIDOGREL BISULFATE 75 MG: 75 TABLET, FILM COATED ORAL at 08:27

## 2017-05-17 RX ADMIN — PANTOPRAZOLE SODIUM 40 MG: 20 TABLET, DELAYED RELEASE ORAL at 08:26

## 2017-05-17 RX ADMIN — BUMETANIDE 0.5 MG: 0.5 TABLET ORAL at 17:33

## 2017-05-17 NOTE — PLAN OF CARE
"Problem: Goal/Outcome  Goal: Goal Outcome Summary  FOCUS/GOAL  Bladder management and Mobility     ASSESSMENT, INTERVENTIONS AND CONTINUING PLAN FOR GOAL:  Pt is alert and oriented, uses call light appropriately. Denies shortness of breath, acetaminophen given per PRN orders x1 for c/o left thigh pain. Pt requests to be given something for \"allergies\", c/o runny nose and sneezing. Message left for physician.  available at bedside for therapies. Pt's dawkins catheter dc'd at 0630 this a.m., pt continent of bladder x4 on this shift using toilet in bathroom. Elevated PVRs noted (see I/O flow sheet), pt refused straight catheterization after this writer explained via  the importance of doing so to help him empty his bladder. Offered again in the afternoon, pt continues to refuse straight catheterization. He says \"I'll drink more water and then go in toilet\". Dr Chacon notified, no new orders received at this time. Pt transfers with walker/gait belt and x1 assist without difficulty. Family brought in food from home for pt's breakfast and lunch. Will continue to monitor.           "

## 2017-05-17 NOTE — PLAN OF CARE
Problem: Goal/Outcome  Goal: Goal Outcome Summary  PT: gait training with SEC today to challenge balance. Pt had intermittent LOB requiring min/mod A to correct. He has difficulty maintaining pattern, pace, and focus on task. Reinforced that cane is just to be used in therapy, and that walker should be used for room mobility with assist from staff.

## 2017-05-17 NOTE — PLAN OF CARE
Problem: Goal/Outcome  Goal: Goal Outcome Summary  Outcome: No Change  FOCUS/GOAL  Bowel management, Bladder management, Nutrition/Feeding/Swallowing precautions and Medical management     ASSESSMENT, INTERVENTIONS AND CONTINUING PLAN FOR GOAL:  Bp at start of shift is 98/74 rechecked at 1645 89/60 Bumex scheduled at 1600 was hold due to low BP NP was updated and she said she will schedule it to be given tomorrow AM .  Low urine output from dawkins catheter cranberry colored urine , no bleeding or excoriation noted on the penile m is meatus gave extra 100 cc of water through the NJTto help elevate the bp and clear the urine   although pt is on fluid restrictions of 1800 ml per day , his po intake is more below on his restrictions .  BP was checked at 2015 after he ate it went up to 113/83 dawkins output gradually turns to clear yellow in good amount .  Wife brought food from home and he ate good .  Feeling constipated LBM recorded was 5/15 Miralax and Senna were given had  A large results at hs .  Will continue to monitor BP and keep him hemodynamically stable .

## 2017-05-17 NOTE — PROGRESS NOTES
[facility], [place]   Physical Medicine and Rehabilitation Daily Note    51-year-old with functional impairments in mobility, activities of daily living and mild cognition following month and a half hospital stay starting with significant myocardial infarction with cardiogenic shock, but also ischemic stroke watershed distribution affecting the left side greater than right. Admitted to acute rehab 5/12/17.           Assessment and Plan of Care:   Formal team rounds held today, please see separate document in Plan of Care tab for full details. Briefly showing some progress. PO picking up some but not at target to nasal feedings continue to augment. Hope FT out by end of week. Balance and coordination showing some gains. Some education given on home equipment. Some inconsistent  services in person so resorting to Ipad which is sub optimal. cognition seeming to be improving as well but some deficits with insights.  Looking at needing another about 10 days for d/c. Plan CC next Tue after d/c. Ongoing therapy TBD.    Urine retention: trail dawkins out on Wed AM. Had had retention up to near 1000 mL.    Trial with lower TF cycle to 8 hours. Consider adding appetite stimulant.    Continue therapies and plan of care    Medical issues:  Appreciate hospitalist consult  Diabetes: some diffiuclty with family bringing in food but not clear on amount of carbs, will work towards simplified routine with no SS or carb count if that has reasonable control. Continues with glargine.  Cards: weight bit up, re starting bumex low dose 0.5 BID. Continues spironolactone. Continues low dose lisinopril and digoxin. No BB for now with recent heart failure decompensation.  plavix for 1 year with stent.  Continues with warfarin target INR 2-3.had been bit low and bridged with enoxaparin. 2.0 today and will stop.  Hyperlipidemia on statin  Stage III coccygeal pressure ulcer continues with mepilex and  offloading, right upper chest open wound still from recent vascular access and balloon pump, nugauze packing in tract.   Tobacco dependence, doing ok with urges.         Review of Systems:   Chavis not painful but wanting out as soon as he can  Nasal feeding tube not painful.  No shortness of breath or chest pain.  Balance improving some.  Sleeping OK.          Physical Exam:     Vitals:    05/16/17 1530 05/16/17 1605 05/16/17 1645 05/16/17 2036   BP: 98/74  (!) 89/60 113/83   BP Location:   Right arm    Pulse: 110  110    Resp: 16      Temp: 97.2  F (36.2  C)      TempSrc: Oral      SpO2: 97% 97%     Weight:       Height:         Alert, conversant. No diaphoresis  FT in place, not irritating nare  Heart RRR  Lungs clear  Chavis in place with clear yellow urine.         Data:   Scheduled meds    bumetanide  0.5 mg Oral BID     pantoprazole  40 mg Oral Daily     lisinopril  2.5 mg Oral BID     insulin aspart   Subcutaneous TID w/meals     senna-docusate  2 tablet Oral BID     atorvastatin  40 mg Oral or Feeding Tube QPM     clopidogrel  75 mg Oral Daily     digoxin  125 mcg Oral Daily     insulin glargine  25 Units Subcutaneous QAM AC     spironolactone  12.5 mg Oral Daily     insulin aspart  1-7 Units Subcutaneous TID AC     insulin aspart  1-5 Units Subcutaneous At Bedtime       PRN meds:  diphenhydrAMINE, insulin aspart, bisacodyl, polyethylene glycol, acetaminophen, melatonin, glucose **OR** dextrose **OR** glucagon, Warfarin Therapy Reminder, - MEDICATION INSTRUCTIONS -, IV fluid REPLACEMENT ONLY         Floor time:     25 minutes  Face-to-face: 15 minutes  Total time:      40 minutes

## 2017-05-17 NOTE — PROGRESS NOTES
PM&R Daily Note:    Chavis out this AM and only some emptying 100-200 mL volitional and PVR in 400-550 range. He had been refusing intermittent catheterization through day but agreed with my conversation to have this.     Would ideally start an alpha blocker for urine retention but blood pressures still with some considerable low 86/60 at times. Has spironolactone 12.5, lisinopril 2.5 daily, bumex 0.5 mg daily.    Right upper chest wound needs to be redressed.      Vitals:    05/17/17 0806 05/17/17 1119 05/17/17 1628 05/17/17 1719   BP: 100/73 118/76 105/76 109/81   BP Location: Right arm Left arm Right arm Right arm   Pulse: 114  117 110   Resp: 16  16    Temp: 98.2  F (36.8  C)  96.7  F (35.9  C)    TempSrc: Oral  Oral    SpO2: 97%  99%    Weight:       Height:         Alert, pleasant, using  to augment  Heart mild tachy but regular  Lungs clear  Right upper chest sites with some dry gauze covering and mild drainage dry overlying.

## 2017-05-17 NOTE — PLAN OF CARE
"Problem: Goal/Outcome  Goal: Goal Outcome Summary  SLP:   present for session. Pt perseverating on transferring to a rehab location in [place] to be closer to home.  During word recall task, Pt required at least 2 repetitions of items (4 words) and occassionaly 3 repetetions for 100% recall, he was able to recall words by attribute w/o assist.  During deductive reasong task, faded verbal cues as task progressed.  Pt benefitted from reading information 2-3x before making decision.  Requested interpretation of reading frequently.  Difficulty determining \"problem\" when given a visual/how it would be a problem and the risk it posed, required mod-maximal verbal cues to complete the task with 100% accuracy and then generate a solution.            "

## 2017-05-17 NOTE — PROGRESS NOTES
[facility] CLARIFICATION NOTE FOR ADMIT ASSESSMENT PERIOD  Lowest score for each FIM item supported by available charting    Eating: FIM 1. Nursing manages all tube feeding.  Bathing: FIM 3. Patient was able to perform 5 out of 10 body parts.  Bladder:  FIM 1. Patient needed total assist with straight cath and dawkins care.  Bladder Number of Accidents: 0  Bowel: FIM 1. Charting indicates patient had a bowel accident and needed total assist to clean up.  Bowel Number of Accidents: 2.  Comprehension: FIM 5. Charting indicates patient needs prompting to understand less than 10% of the time.  Expression: FIM 5. Charting indicates patient is able to express himself, needs prompting less than 10% of the time.  Social Interaction: FIM 6. Patient is calm and cooperative, takes medication.  Problem solving: FIM 5.  Patient needs assist to solve problems less than 10% of the time.  Memory: FIM 3. Patient needs assist to recall information about 50% of the time.  Forgets to bring tube feeding or dawkins with him.

## 2017-05-17 NOTE — PLAN OF CARE
Problem: Goal/Outcome  Goal: Goal Outcome Summary  Outcome: Improving  FOCUS/GOAL  Medical management     ASSESSMENT, INTERVENTIONS AND CONTINUING PLAN FOR GOAL:  Patient slept well. No c/o pain. Nasoenteric tube feeding was stopped at 0230. Chavis catheter removed at 0630.  He is independent with bed mobility, he was encouraged to be in side lying position, he has a pressure sore on has coccyx, he was cooperative.   He speaks minimal English, he needs  services.

## 2017-05-17 NOTE — PROGRESS NOTES
Acute Rehabilitation Unit  Internal Medicine - Daily Progress Note    Luke Henao MRN# [id number]   YOB: 1965 Age: 51 year old   Date of Admission: 5/12/2017  Date of Service: 5/17/2017         Assessment and Recmmendations:   Mr. Luke Henao is a 52 yo male with hx of smoking and DM admitted initially to [facility] on 3/26 with cardiogenic shock s/p PCI of RCA and placement of balloon pump, transferred to [facility] 2/2 inferior wall STEMI, now s/p placement of seven FEMI to LAD, LCx and RCA on 3/27 with post-procedure c/b recurrent cardiogenic shock, finally resolved s/p MitraClip placement on 5/1 2/2 severe ischemic MR, transferred to ARU on 5/12 for aggressive rehabilitation.      CAD, Ischemic cardiomyopathy, recurrent cardiogenic shock (resolved) - Developed resultant ischemic cardiomyopathy secondary to inferior wall STEMI and had 7 FEMI to LAD, LCx, RCA on 3/27. Change in EKG seen on 4/1 and repeat angio was performed showing LCx was  and was noted to be re-occluded (previously opened on 3/27). Despite PCI the patient continued to have refractory cardiogenic shock and inability to wean from the IABP. MitraClip placed on 5/1 for ischemic mitral reguritation. The IABP was slowly weaned post procedure. Echo 5/1 with inferior hypokinesis and est EF 30-35%. Discontinued IABP on 5/5.  Pt BP soft with concern for hypovolemia therefore Bumex DC'd 5/13 and Lisinopril dose decreased to 2.5 mg PO daily.  Denies cardiac complaints.  Weight 122 lb today from 118 lbs.  Restarted on Bumex 0.5 mg Po BID 5/16.  Clinically euvolemic. BP 90s-100s/60s-80s.  - PT/OT per PM & R  - Continue Lisinopril 2.5 mg BID, no increase today  - Restart Bumex 0.5 mg PO BID, HOLD for SBP <100   - Daily standing weights with strict I/O  - Continue Digoxin 125 mcg PO daily  - Continue Spironolactone 12.5 mg PO daily  - Continue FR 1.8 L per day.   - Continue Plavix x 1 year  - Beta blocker being held in the setting of recent  heart failure decompensation and will be added back as an outpatient.      Mitral regurg s/p MitraClip - Developed ischemic mitral regurgitation as a result of his STEMI. Due to refractory cardiogenic shock and inability to wean from IABP a MitraClip procedure was performed.   - Needs lifelong antiplatelet with plan to continue Plavix for 1 year for FEMI then change to ASA.      H/o CVA - CVA developed left hand weakness and pain on 4/29 and a stroke code was called. CTA head/neck showed no hemorrhage but new areas of low attenuation in b/l hemispheres. Neuro consulted.   - Continuing Plavix and statin for prevention.   - PT/OT per PM&R      Type 2 diabetes mellitus - HA1C 7.5 on admission. Initially on insulin gtt and transitioned to Lantus and Novolog sliding scale.  Current glucoses 118-178.    - Continue with Lantus 25 units qam   - Continue Novolog 1 unit per 15 g CHO with meals and snacks.  - Cont Novolog medium correction scale  - Monitor sugars TID before meals, qhs and 0200.      Urinary retention - No history of BPH. Patient require dawkins catheter for much of hospitalization. Discontinued several days prior to discharge and since has had intermittent retention likely secondary to prolonged dawkins.  Urinary retention note don 5/12 therefore dawkins reinserted per primary team.   - Could consider starting Flomax at some point, however, not currently given soft BP  - Trial dawkins D/C per primary team      Hx of acute DVT - During hospitalization pt developed a non-occlusive clot in left IJ and left common femoral vein. Started on Coumadin with bridging heparin gtt low intensity. DVT considered provoked in the setting of prolonged hospitalization and critical illness and therefore warfarin course should be ~3-6 months. INR goal 2-3, 2.48 today.  Lovenox started 5/14 for subtherapeutic INR.  - Pharmacy consulted to dose daily warfarin based on serial INRs. Appreciate assistance.  - D/C Lovenox for therapeutic INR  -  F/u in warfarin clinic after discharge      Non-severe protein- calorie malnutrition -  Within in the context of acute illness. Started on TFs in hospital of which he is tolerating.  Calorie counts ongoing.  Recent count: 5/12: 536 kcal, 5/13: 810 kcal, 5/14: 895 kcal, 5/15: 350 kcal.   - Nutrition consulted and appreciate following, will follow calorie counts    - Continue TF via NDT   - Continue low Na diet       Sinus tachycardia - Persistent during hospitalization. No current evidence of infection. Likely from recovering cardiac function. Ongoing HR 100s although in 80s this AM.  Not on beta blocker given recent heart failure decompensation.     - Continue to monitor.   - Will be started on a betablocker as an outpatient as cardiac function/heart failure continues to improve.      Hx of Lower GIB - Had several maroon BMs since 4/11, Hb overall dropped by a 1.5 grams or so, heparin infusion was held and GI consulted. Colonoscopy 4/17 showed a rectal ulcer thought secondary to retal tube but no intervenable lesions. Hgb remains stable and no recurrence of melena.  - Continue on Protonix 40 PO qD.      Insomnia -  Difficulty sleeping intermittently throughout admission. Improved.   - Melatonin qHS and Seroquel 12.5mg qHS.      Hypoxic respiratory failure, resolved - Initially presented intubated after STEMI. Extubated on 4/3. Re-intubated in the setting of worsening hypoxia on 4/25, extubated 4/28. Thought to be aspiration pneumonia/concomittant HCAP. Had already been treated with broad spectrum antibiotic starting 4/19. Completed 2 week course. Continued to improve and on O2 for comfort upon transfer. Currently denies SOB and O2 sats on RA WNL.      Urinary tract infection, resolved - UA 4/15 with cloudy urine, 50 WBCs, many bacteria, no nitrates. UCx grew E coli - treated with CFTX (sensitive). Fever on 4/20.  Currently afebrile.  Completed Vanc and pip/tazo treatment course on 5/1.     I personally examined Luke  DEBBI Henao today, and reviewed vital signs, medications and pertinent labs or imaging. Thank you for this opportunity to be involved in your patient's care.   Recommendations reviewed with attending physician Dr. Burks and communicated to primary team via this note.     Chula Rascon, [facility]   Hospitalist Service   Pager: [phone]       Interval History:     No acute events overnight.  He was incontinent of bowel yesterday.  Improving with therapies albeit slowly.  Denies any physical complaints currently.  Denies fever, chills, NVD, abd pain.  No pain with dawkins but wants it out.  Denies chest pain or SOB.              Review of Systems:   A 10 point ROS was performed and negative unless otherwise noted in HPI.           Medications:     Current Facility-Administered Medications   Medication     warfarin (COUMADIN) tablet 3 mg     bumetanide (BUMEX) tablet 0.5 mg     pantoprazole (PROTONIX) EC tablet 40 mg     diphenhydrAMINE (BENADRYL) capsule 25-50 mg     lisinopril (PRINIVIL/Zestril) tablet 2.5 mg     insulin aspart (NovoLOG) inj (RAPID ACTING)     insulin aspart (NovoLOG) inj (RAPID ACTING)     senna-docusate (SENOKOT-S;PERICOLACE) 8.6-50 MG per tablet 2 tablet     bisacodyl (DULCOLAX) Suppository 10 mg     polyethylene glycol (MIRALAX/GLYCOLAX) Packet 17 g     acetaminophen (TYLENOL) tablet 650 mg     atorvastatin (LIPITOR) tablet 40 mg     clopidogrel (PLAVIX) tablet 75 mg     digoxin (LANOXIN) tablet 125 mcg     insulin glargine (LANTUS) injection 25 Units     melatonin tablet 3 mg     spironolactone (ALDACTONE) half-tab 12.5 mg     glucose 40 % gel 15-30 g    Or     dextrose 50 % injection 25-50 mL    Or     glucagon injection 1 mg     insulin aspart (NovoLOG) inj (RAPID ACTING)     insulin aspart (NovoLOG) inj (RAPID ACTING)     Warfarin Therapy Reminder (Check START DATE - warfarin may be starting in the FUTURE)     Patient is already receiving anticoagulation with heparin, enoxaparin (LOVENOX), warfarin  (COUMADIN)  or other anticoagulant medication     dextrose 10 % 1,000 mL infusion            Physical Exam:   Blood pressure 118/76, pulse 114, temperature 98.2  F (36.8  C), temperature source Oral, resp. rate 16, height 1.524 m (5'), weight 55.7 kg (122 lb 12.8 oz), SpO2 97 %.  Body mass index is 23.98 kg/(m^2).  GENERAL: Alert and oriented x 3. Laying in bed.    HEENT: Anicteric sclera. PERRL. Mucous membranes moist and without lesions. NDT in place, no breakdown.  CV: RRR. S1, S2. No murmurs appreciated.   RESPIRATORY: Effort normal on RA. Lungs CTAB with no wheezing, rales, rhonchi.   GI: Abdomen soft and non distended, bowel sounds present. No tenderness, rebound, guarding. Chavis catheter in place draining clear, yellow urine.   MUSCULOSKELETAL: No joint swelling or tenderness. Moves all extremities.    NEUROLOGICAL: No focal deficits. Strength 5/5 bilaterally in upper and lower extremities.   EXTREMITIES: No peripheral edema. Intact bilateral pedal pulses.   SKIN: No jaundice. No rashes.         Data:   I personally reviewed the following studies:    ROUTINE IP LABS (Last four results)  CMP     Recent Labs  Lab 05/15/17  0700 05/12/17  0651 05/11/17  1933 05/11/17  0638    138 136 135   POTASSIUM 4.4 4.6 4.0 4.1   CHLORIDE 108 104 99 98   CO2 25 30 31 30   ANIONGAP 7 4 6 7   * 135* 170* 128*   BUN 23 23 26 28   CR 0.68 0.74 0.81 0.86   ROB 8.8 7.9* 8.1* 8.4*   MAG 1.8 2.3 2.2 2.0   PHOS 4.4 3.1  --  3.6     CBC     Recent Labs  Lab 05/15/17  0700 05/12/17  0651 05/11/17  0638   WBC 7.9 8.5 8.7   RBC 3.55* 3.31* 3.41*   HGB 10.7* 9.8* 10.1*   HCT 33.4* 32.0* 33.1*   MCV 94 97 97   MCH 30.1 29.6 29.6   MCHC 32.0 30.6* 30.5*   RDW 16.3* 17.3* 17.6*    372 279     INR     Recent Labs  Lab 05/17/17  0639 05/16/17  0830 05/15/17  0700 05/14/17  0618   INR 2.48* 2.00* 1.66* 1.72*         Unresulted Labs Ordered in the Past 30 Days of this Admission     Date and Time Order Name Status Description     4/28/2017 0924 AFB Culture Non Blood Preliminary     4/27/2017 1139 AFB Culture Non Blood Preliminary     4/26/2017 1250 AFB Culture Non Blood -- BAL Site 1 Preliminary     4/26/2017 1021 Fungus Culture, non-blood - BAL Site 1 Preliminary

## 2017-05-18 LAB
GLUCOSE BLDC GLUCOMTR-MCNC: 106 MG/DL (ref 70–99)
GLUCOSE BLDC GLUCOMTR-MCNC: 135 MG/DL (ref 70–99)
GLUCOSE BLDC GLUCOMTR-MCNC: 148 MG/DL (ref 70–99)
GLUCOSE BLDC GLUCOMTR-MCNC: 156 MG/DL (ref 70–99)
GLUCOSE BLDC GLUCOMTR-MCNC: 67 MG/DL (ref 70–99)
INR PPP: 2.36 (ref 0.86–1.14)

## 2017-05-18 PROCEDURE — 25000132 ZZH RX MED GY IP 250 OP 250 PS 637: Performed by: PHYSICAL MEDICINE & REHABILITATION

## 2017-05-18 PROCEDURE — 25000132 ZZH RX MED GY IP 250 OP 250 PS 637: Performed by: PHYSICIAN ASSISTANT

## 2017-05-18 PROCEDURE — 97116 GAIT TRAINING THERAPY: CPT | Mod: GP | Performed by: REHABILITATION PRACTITIONER

## 2017-05-18 PROCEDURE — 97532 ZZHC SP COGNITIVE SKILLS EA 15 MIN: CPT | Mod: GN

## 2017-05-18 PROCEDURE — 97530 THERAPEUTIC ACTIVITIES: CPT | Mod: GP | Performed by: REHABILITATION PRACTITIONER

## 2017-05-18 PROCEDURE — 99232 SBSQ HOSP IP/OBS MODERATE 35: CPT | Performed by: NURSE PRACTITIONER

## 2017-05-18 PROCEDURE — 85610 PROTHROMBIN TIME: CPT | Performed by: PHYSICIAN ASSISTANT

## 2017-05-18 PROCEDURE — 36415 COLL VENOUS BLD VENIPUNCTURE: CPT | Performed by: PHYSICIAN ASSISTANT

## 2017-05-18 PROCEDURE — 25000132 ZZH RX MED GY IP 250 OP 250 PS 637: Performed by: NURSE PRACTITIONER

## 2017-05-18 PROCEDURE — 40000193 ZZH STATISTIC PT WARD VISIT: Performed by: REHABILITATION PRACTITIONER

## 2017-05-18 PROCEDURE — 40000133 ZZH STATISTIC OT WARD VISIT: Performed by: STUDENT IN AN ORGANIZED HEALTH CARE EDUCATION/TRAINING PROGRAM

## 2017-05-18 PROCEDURE — T1013 SIGN LANG/ORAL INTERPRETER: HCPCS | Mod: U3

## 2017-05-18 PROCEDURE — 00000146 ZZHCL STATISTIC GLUCOSE BY METER IP

## 2017-05-18 PROCEDURE — 27210436 ZZH NUTRITION PRODUCT SEMIELEM INTERMED CAN

## 2017-05-18 PROCEDURE — 12800006 ZZH R&B REHAB

## 2017-05-18 PROCEDURE — 40000225 ZZH STATISTIC SLP WARD VISIT

## 2017-05-18 PROCEDURE — 97110 THERAPEUTIC EXERCISES: CPT | Mod: GP | Performed by: REHABILITATION PRACTITIONER

## 2017-05-18 PROCEDURE — 97530 THERAPEUTIC ACTIVITIES: CPT | Mod: GO | Performed by: STUDENT IN AN ORGANIZED HEALTH CARE EDUCATION/TRAINING PROGRAM

## 2017-05-18 RX ORDER — WARFARIN SODIUM 3 MG/1
3 TABLET ORAL
Status: COMPLETED | OUTPATIENT
Start: 2017-05-18 | End: 2017-05-18

## 2017-05-18 RX ORDER — TAMSULOSIN HYDROCHLORIDE 0.4 MG/1
0.4 CAPSULE ORAL DAILY
Status: DISCONTINUED | OUTPATIENT
Start: 2017-05-18 | End: 2017-05-26 | Stop reason: HOSPADM

## 2017-05-18 RX ORDER — ASCORBIC ACID 500 MG
500 TABLET ORAL DAILY
Status: DISCONTINUED | OUTPATIENT
Start: 2017-05-19 | End: 2017-05-26 | Stop reason: HOSPADM

## 2017-05-18 RX ORDER — BETA-CAROTENE 7500 MCG
25000 CAPSULE ORAL DAILY
Status: DISCONTINUED | OUTPATIENT
Start: 2017-05-19 | End: 2017-05-26 | Stop reason: HOSPADM

## 2017-05-18 RX ORDER — CETIRIZINE HYDROCHLORIDE 5 MG/1
10 TABLET ORAL DAILY
Status: DISCONTINUED | OUTPATIENT
Start: 2017-05-18 | End: 2017-05-26 | Stop reason: HOSPADM

## 2017-05-18 RX ORDER — ZINC SULFATE 50(220)MG
220 CAPSULE ORAL DAILY
Status: DISCONTINUED | OUTPATIENT
Start: 2017-05-19 | End: 2017-05-26 | Stop reason: HOSPADM

## 2017-05-18 RX ADMIN — LISINOPRIL 2.5 MG: 2.5 TABLET ORAL at 08:55

## 2017-05-18 RX ADMIN — ATORVASTATIN CALCIUM 40 MG: 40 TABLET, FILM COATED ORAL at 19:39

## 2017-05-18 RX ADMIN — PANTOPRAZOLE SODIUM 40 MG: 20 TABLET, DELAYED RELEASE ORAL at 08:53

## 2017-05-18 RX ADMIN — TAMSULOSIN HYDROCHLORIDE 0.4 MG: 0.4 CAPSULE ORAL at 12:00

## 2017-05-18 RX ADMIN — BUMETANIDE 0.5 MG: 0.5 TABLET ORAL at 18:42

## 2017-05-18 RX ADMIN — INSULIN GLARGINE 25 UNITS: 100 INJECTION, SOLUTION SUBCUTANEOUS at 08:49

## 2017-05-18 RX ADMIN — WARFARIN SODIUM 3 MG: 3 TABLET ORAL at 18:43

## 2017-05-18 RX ADMIN — DIGOXIN 125 MCG: 125 TABLET ORAL at 08:55

## 2017-05-18 RX ADMIN — SENNOSIDES AND DOCUSATE SODIUM 2 TABLET: 8.6; 5 TABLET ORAL at 08:53

## 2017-05-18 RX ADMIN — CLOPIDOGREL BISULFATE 75 MG: 75 TABLET, FILM COATED ORAL at 08:53

## 2017-05-18 RX ADMIN — Medication 12.5 MG: at 08:53

## 2017-05-18 RX ADMIN — BUMETANIDE 0.5 MG: 0.5 TABLET ORAL at 08:54

## 2017-05-18 RX ADMIN — CETIRIZINE HYDROCHLORIDE 10 MG: 5 TABLET ORAL at 08:59

## 2017-05-18 NOTE — PROGRESS NOTES
Acute Rehabilitation Unit  Internal Medicine - Daily Progress Note    Luke Henao MRN# [id number]   YOB: 1965 Age: 51 year old   Date of Admission: 5/12/2017  Date of Service: 5/18/2017         Assessment and Recmmendations:   Mr. Luke Henao is a 50 yo male with hx of smoking and DM admitted initially to [facility] on 3/26 with cardiogenic shock s/p PCI of RCA and placement of balloon pump, transferred to [facility] 2/2 inferior wall STEMI, now s/p placement of seven FEMI to LAD, LCx and RCA on 3/27 with post-procedure c/b recurrent cardiogenic shock, finally resolved s/p MitraClip placement on 5/1 2/2 severe ischemic MR, transferred to ARU on 5/12 for aggressive rehabilitation.      CAD, Ischemic cardiomyopathy, recurrent cardiogenic shock (resolved) - Developed resultant ischemic cardiomyopathy secondary to inferior wall STEMI and had 7 FEMI to LAD, LCx, RCA on 3/27. Change in EKG seen on 4/1 and repeat angio was performed showing LCx was  and was noted to be re-occluded (previously opened on 3/27). Despite PCI the patient continued to have refractory cardiogenic shock and inability to wean from the IABP. MitraClip placed on 5/1 for ischemic mitral reguritation. The IABP was slowly weaned post procedure. Echo 5/1 with inferior hypokinesis and est EF 30-35%. Discontinued IABP on 5/5.  ARU course complicated by softer BPs and hypovolemia therefore Bumex initially DCd 5/13 and Lisinopril weaned to 2.5 mg Po daily.  Maintained on 1800mL fluid restriction but has been getting free water flushes in his tube feedings.  Weight 120 lbs, goal ~118 lbs. Restarted Bumex on 5/15 at hald dose (0.5 mg Po BID).  No current cardiac complaints.  Has ongoing urinary retention (see below) and interested in starting Flomax.  Current BPs 100s-110s/70s-80s.    - Would recommend continuation of 1800 mL fluid restriction as likely patient is taking in slightly more than that.  If any concerns about hypovolemia or  "decreased PO intake in coming days, can consider D/C Bumex vs liberalizing fluid restriction to 2L.   - Given soft BP, will D/C Lisinopril 2.5 mg PO daily and assess BP in next 24-48 hrs  - Would recommend Cardiology follow up for medication optimization next week   - PT/OT per PM & R  - Continue Bumex 0.5 mg PO BID, HOLD for SBP <100, watch weights and BP closely   - Daily standing weights with strict I/O  - Continue Digoxin 125 mcg PO daily  - Continue Spironolactone 12.5 mg PO daily  - Continue FR 1.8 L per day.   - Continue Plavix x 1 year  - Beta blocker being held in the setting of recent heart failure decompensation and will be added back as an outpatient.    Urinary retention - No history of BPH. Patient require dawkins catheter for much of hospitalization. Discontinued several days prior to discharge and since has had intermittent retention likely secondary to prolonged dawkins.  Urinary retention noted on 5/12 therefore dawkins reinserted per primary team. Dawkins removed 5/17 and continues to have retention requiring straight cath.   - Agree with trial of Flomax 0.4 mg PO daily  - Would recommend OP follow up with Urology   - Bladder scanning and straight cath per unit protocol     Type 2 diabetes mellitus - HA1C 7.5 on admission. Initially on insulin gtt and transitioned to Lantus and Novolog sliding scale.  Current glucoses 118-178.  Unknown what his home regimen was.  Per patient, an MD at a free clinic [place] gave him insulin at one point but patient states he \"threw it out\".   Difficult for staff to do carb counting as patient has food brought in from home and unknown amount of carbs in food.  Nevertheless, he has received very little aspart this week (~1 unit) and his glucoses have been fairly ell controlled.   - D/C carb coverage  - At discharge, may need sliding scale which he should have teaching for if he does not have supplies at home, please consult DM educator  - Patient will need to be " established with a PCP at D/C, appreciate CC/SW assistance with this   - Continue with Lantus 25 units qam   - Cont Novolog medium correction scale  - Monitor sugars TID before meals, qhs and 0200.       Mitral regurg s/p MitraClip - Developed ischemic mitral regurgitation as a result of his STEMI. Due to refractory cardiogenic shock and inability to wean from IABP a MitraClip procedure was performed.   - Needs lifelong antiplatelet with plan to continue Plavix for 1 year for FEMI then change to ASA.      H/o CVA - CVA developed left hand weakness and pain on 4/29 and a stroke code was called. CTA head/neck showed no hemorrhage but new areas of low attenuation in b/l hemispheres. Neuro consulted.   - Continuing Plavix and statin for prevention.   - PT/OT per PM&R      Hx of acute DVT - During hospitalization pt developed a non-occlusive clot in left IJ and left common femoral vein. Started on Coumadin with bridging heparin gtt low intensity. DVT considered provoked in the setting of prolonged hospitalization and critical illness and therefore warfarin course should be ~3-6 months. INR goal 2-3, 2.36 today.  Lovenox started 5/14 for subtherapeutic INR and D/Cd 5/17.   - Pharmacy consulted to dose daily warfarin based on serial INRs. Appreciate assistance.  - F/u in warfarin clinic after discharge      Non-severe protein- calorie malnutrition -  Within in the context of acute illness. Started on TFs in hospital of which he is tolerating.  Calorie counts ongoing.  Recent count: 5/12: 536 kcal, 5/13: 810 kcal, 5/14: 895 kcal, 5/15: 350 kcal, 5/17: 1436.   - Agree with D/C feeding tube today   - Nutrition consulted and appreciate following, will follow calorie counts    - Continue low Na diet       Sinus tachycardia - Persistent during hospitalization. No current evidence of infection. Likely from recovering cardiac function. Ongoing HR 100s although in 80s this AM.  Not on beta blocker given recent heart failure  decompensation.     - Continue to monitor.   - Will be started on a betablocker as an outpatient as cardiac function/heart failure continues to improve.      Hx of Lower GIB - Had several maroon BMs since 4/11, Hb overall dropped by a 1.5 grams or so, heparin infusion was held and GI consulted. Colonoscopy 4/17 showed a rectal ulcer thought secondary to retal tube but no intervenable lesions. Hgb remains stable and no recurrence of melena.  - Continue on Protonix 40 PO qD      Insomnia -  Difficulty sleeping intermittently throughout admission. Improved.   - Melatonin qHS and Seroquel 12.5mg qHS      Hypoxic respiratory failure, resolved - Initially presented intubated after STEMI. Extubated on 4/3. Re-intubated in the setting of worsening hypoxia on 4/25, extubated 4/28. Thought to be aspiration pneumonia/concomittant HCAP. Had already been treated with broad spectrum antibiotic starting 4/19. Completed 2 week course. Continued to improve and on O2 for comfort upon transfer. Currently denies SOB and O2 sats on RA WNL.      Urinary tract infection, resolved - UA 4/15 with cloudy urine, 50 WBCs, many bacteria, no nitrates. UCx grew E coli - treated with CFTX (sensitive). Fever on 4/20.  Currently afebrile.  Completed Vanc and pip/tazo treatment course on 5/1.     I personally examined Luke Henao today, and reviewed vital signs, medications and pertinent labs or imaging. Thank you for this opportunity to be involved in your patient's care.   Recommendations reviewed with attending physician Dr. Bruner and communicated to primary team via this note.     Chula Rascon, [facility]   Hospitalist Service   Pager: [phone]     Interval History:     No acute events overnight.  He would like the feeding tube out.  Did have some issues with urinary retention.  We discussed insulin and teaching today, he will need to have more education in this regard.  Otherwise, denies fever, chills, NVD, abd pain.           Review of Systems:    A 10 point ROS was performed and negative unless otherwise noted in HPI.           Medications:     Current Facility-Administered Medications   Medication     cetirizine (zyrTEC) tablet 10 mg     tamsulosin (FLOMAX) capsule 0.4 mg     alum & mag hydroxide-simethicone (MYLANTA ES/MAALOX  ES) suspension 30 mL     bumetanide (BUMEX) tablet 0.5 mg     pantoprazole (PROTONIX) EC tablet 40 mg     diphenhydrAMINE (BENADRYL) capsule 25-50 mg     insulin aspart (NovoLOG) inj (RAPID ACTING)     senna-docusate (SENOKOT-S;PERICOLACE) 8.6-50 MG per tablet 2 tablet     bisacodyl (DULCOLAX) Suppository 10 mg     polyethylene glycol (MIRALAX/GLYCOLAX) Packet 17 g     acetaminophen (TYLENOL) tablet 650 mg     atorvastatin (LIPITOR) tablet 40 mg     clopidogrel (PLAVIX) tablet 75 mg     digoxin (LANOXIN) tablet 125 mcg     insulin glargine (LANTUS) injection 25 Units     melatonin tablet 3 mg     spironolactone (ALDACTONE) half-tab 12.5 mg     glucose 40 % gel 15-30 g    Or     dextrose 50 % injection 25-50 mL    Or     glucagon injection 1 mg     insulin aspart (NovoLOG) inj (RAPID ACTING)     insulin aspart (NovoLOG) inj (RAPID ACTING)     Warfarin Therapy Reminder (Check START DATE - warfarin may be starting in the FUTURE)     Patient is already receiving anticoagulation with heparin, enoxaparin (LOVENOX), warfarin (COUMADIN)  or other anticoagulant medication     dextrose 10 % 1,000 mL infusion            Physical Exam:   Blood pressure 108/78, pulse 110, temperature 96.7  F (35.9  C), temperature source Oral, resp. rate 16, height 1.524 m (5'), weight 54.4 kg (120 lb), SpO2 99 %.  Body mass index is 23.44 kg/(m^2).  GENERAL: Alert and oriented x 3. Laying in bed.    HEENT: Anicteric sclera. PERRL. Mucous membranes moist and without lesions. NDT in place, no breakdown.  CV: RRR. S1, S2. No murmurs appreciated.   RESPIRATORY: Effort normal on RA. Lungs CTAB with no wheezing, rales, rhonchi.   GI: Abdomen soft and non distended,  bowel sounds present. No tenderness, rebound, guarding. Chavis catheter in place draining clear, yellow urine.   MUSCULOSKELETAL: No joint swelling or tenderness. Moves all extremities.    NEUROLOGICAL: No focal deficits. Strength 5/5 bilaterally in upper and lower extremities.   EXTREMITIES: No peripheral edema. Intact bilateral pedal pulses.   SKIN: No jaundice. No rashes.         Data:   I personally reviewed the following studies:    ROUTINE IP LABS (Last four results)  CMP     Recent Labs  Lab 05/15/17  0700 05/12/17  0651 05/11/17  1933    138 136   POTASSIUM 4.4 4.6 4.0   CHLORIDE 108 104 99   CO2 25 30 31   ANIONGAP 7 4 6   * 135* 170*   BUN 23 23 26   CR 0.68 0.74 0.81   ROB 8.8 7.9* 8.1*   MAG 1.8 2.3 2.2   PHOS 4.4 3.1  --      CBC     Recent Labs  Lab 05/15/17  0700 05/12/17  0651   WBC 7.9 8.5   RBC 3.55* 3.31*   HGB 10.7* 9.8*   HCT 33.4* 32.0*   MCV 94 97   MCH 30.1 29.6   MCHC 32.0 30.6*   RDW 16.3* 17.3*    372     INR     Recent Labs  Lab 05/17/17  0639 05/16/17  0830 05/15/17  0700 05/14/17  0618   INR 2.48* 2.00* 1.66* 1.72*         Unresulted Labs Ordered in the Past 30 Days of this Admission     Date and Time Order Name Status Description    4/28/2017 0924 AFB Culture Non Blood Preliminary     4/27/2017 1139 AFB Culture Non Blood Preliminary     4/26/2017 1250 AFB Culture Non Blood -- BAL Site 1 Preliminary     4/26/2017 1021 Fungus Culture, non-blood - BAL Site 1 Preliminary

## 2017-05-18 NOTE — PLAN OF CARE
Problem: Goal/Outcome  Goal: Goal Outcome Summary  Outcome: No Change  FOCUS/GOAL  Bladder management and Medical management     ASSESSMENT, INTERVENTIONS AND CONTINUING PLAN FOR GOAL:  Alert, able to make needs known. Daughter spent night in room. Continent of bladder with total output of 200 cc with PVR of 423 cc after double voiding. Pt was straight cathed for 600 cc of clear, yellow urine, tolerated well procedure. Independent with turning and reposition. Denies pain or discomfort.

## 2017-05-18 NOTE — PROGRESS NOTES
05/17/17 1600   Signing Clinician's Name / Credentials   Signing clinician's name / credentials Martita Mills DPT   Quick Adds   Rehab Discipline PT   Neuromuscular Re-education   Symptoms Noted During/After Treatment fatigue   Treatment Detail PT; balance exercises in static standing with alterative foot positioning and eyes open/closed. Pt uses cane for external support in R hand. Completes slow marches, SL stance, feet together, tandem, and weight shifting with min A to correct small LOB. Significant balance disturbance when closing eyes, needed mod A to correct. Many seated rest breaks taken for recovery and rest.    Additional Documentation   PT Plan PT: balance challenges: feet on foam mat, backwards walking in // bars, stairs with one rail    Total Session Time   Total Session Time (minutes) 30 minutes  (= 10 gait, 20 neuro)   PT - Acute Rehab Center Time   Individual Time (minutes) - enter zero if not applicable - PT 30   Group Time (minutes) - enter zero if not applicable  - PT 0   Concurrent Time (minutes) - enter zero if not applicable  - PT 0   Co-Treatment Time (minutes) - enter zero if not applicable  - PT 0   ARC Total Session Time (minutes) - PT 30   Sit to Stand   Assistance Needed Supervision   Physical Assistance Level No physical assistance   Comment PT: SBA sit <> stand using cane   Locomotion (FIM)   Assistive Devices Single end cane   Functional Performance Safety concern (see comments)   Locomotion Comment PT: gait training with SEC for balance challenge. Pt needed min/mod A to correct intermittent LOB. Needed constant cues for pace control, focus on task, and sequencing. Amb 120 ft x 2 with SEC, MOD A.    Walk 10 Feet   Physical Assistance Level 25%-49%   Comment See FIM   Walk 50 Feet with Two Turns   Physical Assistance Level 25%-49%   Comment See FIM

## 2017-05-18 NOTE — PLAN OF CARE
Problem: Goal/Outcome  Goal: Goal Outcome Summary  Outcome: No Change  FOCUS/GOAL  Bladder management, Nutrition/Feeding/Swallowing precautions, Medical management and Mobility     ASSESSMENT, INTERVENTIONS AND CONTINUING PLAN FOR GOAL:  Pt's HR tachycardic per baseline. Rest of vitals stable. BGs 135 and 106. Carb count novolog had been discontinued. DM Educator still needed for education on lantus, correction novolog and BG checks with pt's wife and . Pt started on flomax today. Still with urinary retention. Pt wanting to void in the toilet and had tried double voiding but just voiding small amounts so finally straight cathed at 1450 for 650cc. Pt's NG was pulled out by Dr Chacon so pt needs to still have calorie counts. Pt able to transfer and walk in the room with CGA and walker.

## 2017-05-18 NOTE — PROGRESS NOTES
05/17/17 0847   Signing Clinician's Name / Credentials   Signing clinician's name / credentials Poornima Vela MS, SLP-[facility]   Quick Adds   Rehab Discipline SLP   Additional Documentation    Present Yes   SLP Plan SLP: auditory comp/ short term recall- mod level, reasoning and divided attention- can use problem solving pix cards and sequenicng cards; pt may like games and I-pad tasks   Total Session Time   Total Session Time (minutes) 55 minutes   SLP - Acute Rehab Center Time   Individual Time (minutes) - enter zero if not applicable - SLP 55  (cog tx)   Group Time (minutes) - enter zero if not applicable  - SLP 0   Concurrent Time (minutes) - enter zero if not applicable  - SLP 0   Co-Treatment Time (minutes) - enter zero if not applicable  - SLP 0   ARC Total Session Time (minutes) - SLP 55   Problem Solving (FIM)   Problem Solving Comment SLP: Completed deductive reasoning puzzle at basic level, re-reading of clues was necessary for all items, Pt did request translation of the the reading, faded verbal cues as task progressed, overall Pt able to complete with 100% accuracy given min-moderate verbal cues as was occassionally not reading clues in entirety and/or skipping over key information. Pt asked to describe problem in visual representation, Pt able to state w/o assist in 50% of trials and was able to generate appropriate solution in only 20% of trials.  Pt had difficulty connecting the problem with appropriate solution, required extra examples for solving at 100% acc.   Memory (FIM)   Memory Comment SLP: Completed word recall task, Pt able to recall up to 4 words when given 2-3 repetitions.  Could then recall by attribute without assist.

## 2017-05-18 NOTE — PLAN OF CARE
Problem: Goal/Outcome  Goal: Goal Outcome Summary  Outcome: Improving  FOCUS/GOAL  Bladder management and Nutrition/Feeding/Swallowing precautions     ASSESSMENT, INTERVENTIONS AND CONTINUING PLAN FOR GOAL:  High PVR at 1900 pt let the staff do a st cath he voided 50 m PVR was 649 and st cath for 600 , voided again at end of pm shift 800 ml and PVR was 51 he said he felt better .  Good appetite  Wife brought Irish foods and ate it all she will be back at 0500 and will bring breakfast in AM   Declined his TF note left for MD that the pt's does not like the TF anymore   Will continue to monitor bladder status and caloric intake

## 2017-05-18 NOTE — PROGRESS NOTES
Nutrition Services Brief Note    Calorie Count  5/17: 1436 kcals, 50g pro     Lucía Bird, Registration Eligible  Unit Pager: [phone]

## 2017-05-18 NOTE — PLAN OF CARE
Problem: Goal/Outcome  Goal: Goal Outcome Summary  OT: Pt making progress towards goals, UE coordination improving.

## 2017-05-18 NOTE — PROGRESS NOTES
05/17/17 1153   Signing Clinician's Name / Credentials   Signing clinician's name / credentials Anabell Talbot Adds   Rehab Discipline OT   Therapeutic Exercise   Treatment Detail OT: Ended session with endurance building at activity level. Pt. tolerated amb. x 100 feet x 2 with 1 seated rest.   Additional Documentation   Rehab Comments OT:-30 minutes d/t catheter/RN needs   OT Plan OT: ADL routine in cuding functional mobility, household chores, cleaning windows for dynamic balance and useof R UE for coordination   Total Session Time   Total Session Time (minutes) 30 minutes  (10 ther [id number])   OT - Acute Rehab Center Time   Individual Time (minutes) - enter zero if not applicable - OT 30   Group Time (minutes) - enter zero if not applicable  - OT 0   Concurrent Time (minutes) - enter zero if not applicable  - OT 0   Co-Treatment Time (minutes) - enter zero if not applicable  - OT 0   ARC Total Session Time (minutes) - OT 30   Grooming (except oral cares) (FIM)   Grooming Comment OT: Completed upper body wash standing at sink with CGA with set up assistance.    Upper Body Dressing (FIM)   Describe performance OT:After set up pt. donned shirt without physical assistance.    Lower Body Dressing (FIM) (Pants/Undergarments)   Describe performance OT: min A to don/doff pants with some limited flexability.

## 2017-05-18 NOTE — PLAN OF CARE
Problem: Goal/Outcome  Goal: Goal Outcome Summary     Patient having significant difficulties with new learning having difficulties completing tasks in the correct sequence. With repetition showing some improvements at slower rate. Anticipate ongoing SLP intervention anticipated upon facility discharge.

## 2017-05-18 NOTE — PROGRESS NOTES
"   05/17/17 1115   Signing Clinician's Name / Credentials   Signing clinician's name / credentials Stephanie Hutchins, PT   Quick Adds   Rehab Discipline PT   Neuromuscular Re-education   Symptoms Noted During/After Treatment fatigue   Treatment Detail PT; focus on balance with modified tandem stance, arms crossed iwth cuing, cga for balancefinding \"challenge point\" cues for posture, to look ahead x 30 sec x 2 reps each LE, unable to achieve full tandem stance.  Slow marching , needing 1 UE on walker for support (RUE), cga tominA for balance, ataxia noted, 8 reps to fatigue, working on weightshifting, hip abduction stabilitiy with ex.    Additional Documentation    Present Yes   Rehab Comments PT - 15 minutes, speaking aobut dc plans with SW, waiting a bit for .    PT Plan PT: cont gait training with sec for challenge, balance challenges, try foam mat, backward/forward stepping, stairs with 1 rail.    PT - Acute Rehab Center Time   Individual Time (minutes) - enter zero if not applicable - PT 30  (neuro 8, gait 22)   Group Time (minutes) - enter zero if not applicable  - PT 0   Concurrent Time (minutes) - enter zero if not applicable  - PT 0   Co-Treatment Time (minutes) - enter zero if not applicable  - PT 0   ARC Total Session Time (minutes) - PT 30   Sit to Stand   Assistance Needed Adaptive equipment   Comment PT: sba from wc, using ww.    Locomotion (FIM)   Assistive Devices Rolling walker;Single end cane   Functional Performance Safety concern (see comments)   Locomotion Comment PT:focu son gait trianing, pt requesting using WW first to warm up, indoors amb to elevator and down to 1 st floor to get outside, standing rest breaks,  close sba with ww indoor.  Outdoors with FWW cga , sba, slower pace, pt looking down at sidewalk for safety, with cane and Marce, with ataxia noted, and path deviation/LOB to R with distractions (peopele walking by, looking ahead with cuing ).  amb about 130 ft with sec, " then pt wanting to use FWW due to LE fatigue.  Pt amb with fww about 350 ft.    Stairs (FIM)   Assistive Devices Rail;Single point cane   Functional Performance Safety concern (see comments)   Stairs Comment PT: stair training outdoors, 1 rail, cuing for sequencing for ease and safety. step to down and step over step going up , rail on L side going down, cane in opposite hand, min A  for balance when pt stepping down with LLE first and with turning at bottom of steps. x 6 steps to fatigue, pt declining further practice ons teps.

## 2017-05-18 NOTE — PROGRESS NOTES
CLINICAL NUTRITION SERVICES - REASSESSMENT NOTE     Nutrition Prescription    RECOMMENDATIONS FOR MDs/PROVIDERS TO ORDER:  None at this time.     Malnutrition Status:    Non-severe malnutrition in the context of acute illness.     Recommendations already ordered by Registered Dietitian (RD):  1. Continue calorie counts to monitor PO intake.     2. Ordered vitamin/mineral supplementation per PI protocol, stage 3:  -Vitamin C 500mg daily x 10 days   -Vitamin A 25,000 units daily x 10 days  -Zinc sulfate 220 mg daily x 10 days     3. Increased order of Ensure Clear to TID between meals.     Future/Additional Recommendations:  1. Monitor hugo cnts/wt/protein intake/wound healing. Consider additional protein supplementation of beneprotein/prostat if pt consistently unable to meet protein goals to promote wound healing.      EVALUATION OF THE PROGRESS TOWARD GOALS   Diet: 3 gm Sodium Diet + Ensure Clear between meals, 1800 mL fluid restriction   Nutrition Support: TF via ND tube of Peptamen 1.5 @ 60 mL/hr x 8 hrs (cycled overnight 6pm-2am) to provide 480 mL, 720 kcal (13 kcal/kg), 33 g pro (0.6 g/kg) to meet ~40% estimated kcal and pro needs. TF flushes of 30 mL before and after each cycle. Of note, lowered from 12 hr to 8 hr cycle on 5/15 to help stimulate hunger per MD note.     Calorie Counts:   5/12: 2 meals, 536 kcal / 29 gm pro  5/13: 3 meals, 810 kcal / 33 gm pro  5/14: 3 meals, 895 kcal / 44 gm pro  5/15: 350 kcals / 27 g pro   5/17: 1436 kcals / 50 g pro   Average: 805 kcals / 37 g pro suggest meeting ~50% est kcal needs and 45% est pro needs x 5 days.   TF + PO = ~1525 kcal (28 kcal/kg) and 70 g pro (1.3 g/kg) to meet 94% est kcal needs and 86% est pro needs.   Per kcal counts above, pts PO intake is improving. Of note, pt/pts family reported that they have been bringing in food from home for him to eat that has not been making it on the calorie count sheets. Per discussion with MD, 5/17 was first day family  recorded all foods brought from home on calorie count sheets. Per RN notes, pt has been eating well and consuming all foods brought from home on 5/16 and 5/17. RN also documenting good appetite today 5/18, and pt declining his TF stating he does not want the TF anymore.     Per discussion with pt, is PO intake/appetite has improved and reports he is back to his baseline intake. He is consuming foods from home as well as foods from Room Service. He reports consuming 3-4 meals per day and at least two Ensure Clear per day, requesting to increase Ensure Clear to three times daily.      NEW FINDINGS   Weight: Current wt of 54.4 kg (120#) relatively stable, but fluctuating some throughout admission likely 2/2 fluid status as pt diuresing on bumex daily. Overall down 2.5% (3#) x 1 week.     Skin: Per [facility] nurse note 5/16, coccyx pressure injury stable, Stage 3     Labs: 106-199 mg/dL: Per chart review, difficult for staff to do carb counting as patient has food brought in from home and unknown amount of carbs in food.    MALNUTRITION  % Intake: < 75% for > 7 days (non-severe)  % Weight Loss:  > 2% in 1 week (severe), potentially fluid related as pt diuresing with bumex.   Subcutaneous Fat Loss: None observed  Muscle Loss: None observed  Fluid Accumulation/Edema: None noted  Malnutrition Diagnosis: Non-severe malnutrition in the context of acute illness.     Previous Goals   Total avg nutritional intake to meet a minimum of 30 kcal/kg and 1.5 g PRO/kg daily (per dosing wt 54 kg).  Evaluation: Not met    Previous Nutrition Diagnosis  Inadequate oral intake related to reduced appetite, dislike of room service meals as evidenced by previous hugo cts meeting <75% est kcal/pro needs    Evaluation: Improving    CURRENT NUTRITION DIAGNOSIS  Inadequate oral intake related to reduced appetite, dislike of room service meals as evidenced by 5-day average of hugo cts meeting <75% est kcal/pro needs, however, improving with recent hugo cts  from yesterday meeting 94% est kcal needs and 86% est pro needs.     INTERVENTIONS  Implementation  Nutrition Education: At time of education, pt's tube feeding was removed. Encouraged pt to continue good PO intakes of meals at least TID + snacks. Encouraged pt to consume adequate amounts of protein to meet est needs and promote wound healing. Educated on good sources of protein. Pt requested to increase Ensure Clear to three times daily. Encouraged family to continue keeping calorie counts.     Collaboration with other providers - Per discussion with MD, plans to D/C TF today given increase in PO intakes and pt wants TF out, but will continue to follow calorie counts.     Medical Food Supplement - Ordered Ensure Clear TID between meals.     Goals  1. Per calorie counts, total avg nutritional intake to meet a minimum of 30 kcal/kg and 1.5 g PRO/kg daily (per dosing wt 54 kg).    Monitoring/Evaluation  Progress toward goals will be monitored and evaluated per protocol.     Lucía Bird, Registration Eligible  Unit Pager: [phone]

## 2017-05-18 NOTE — PLAN OF CARE
Problem: Goal/Outcome  Goal: Goal Outcome Summary  PT: pt working very hard with PT today, pt demo static, dynamic and gait balance drills needing cGA to min A. Pt demo muscle re -ED to tapping toe to Dots upt 50% on LLE and 80% on RLE. Reaching activities with stepping skills for WB and balance .

## 2017-05-19 LAB
GLUCOSE BLDC GLUCOMTR-MCNC: 116 MG/DL (ref 70–99)
GLUCOSE BLDC GLUCOMTR-MCNC: 144 MG/DL (ref 70–99)
GLUCOSE BLDC GLUCOMTR-MCNC: 170 MG/DL (ref 70–99)
INR PPP: 2.43 (ref 0.86–1.14)

## 2017-05-19 PROCEDURE — 27210436 ZZH NUTRITION PRODUCT SEMIELEM INTERMED CAN

## 2017-05-19 PROCEDURE — 85610 PROTHROMBIN TIME: CPT | Performed by: PHYSICIAN ASSISTANT

## 2017-05-19 PROCEDURE — 25000132 ZZH RX MED GY IP 250 OP 250 PS 637: Performed by: PHYSICIAN ASSISTANT

## 2017-05-19 PROCEDURE — 12800006 ZZH R&B REHAB

## 2017-05-19 PROCEDURE — 97116 GAIT TRAINING THERAPY: CPT | Mod: GP | Performed by: REHABILITATION PRACTITIONER

## 2017-05-19 PROCEDURE — 00000146 ZZHCL STATISTIC GLUCOSE BY METER IP

## 2017-05-19 PROCEDURE — 25000132 ZZH RX MED GY IP 250 OP 250 PS 637: Performed by: NURSE PRACTITIONER

## 2017-05-19 PROCEDURE — 97532 ZZHC SP COGNITIVE SKILLS EA 15 MIN: CPT | Mod: GN | Performed by: SPEECH-LANGUAGE PATHOLOGIST

## 2017-05-19 PROCEDURE — 40000133 ZZH STATISTIC OT WARD VISIT: Performed by: OCCUPATIONAL THERAPIST

## 2017-05-19 PROCEDURE — 99232 SBSQ HOSP IP/OBS MODERATE 35: CPT | Performed by: PHYSICIAN ASSISTANT

## 2017-05-19 PROCEDURE — 40000193 ZZH STATISTIC PT WARD VISIT: Performed by: REHABILITATION PRACTITIONER

## 2017-05-19 PROCEDURE — 97110 THERAPEUTIC EXERCISES: CPT | Mod: GP | Performed by: REHABILITATION PRACTITIONER

## 2017-05-19 PROCEDURE — 25000132 ZZH RX MED GY IP 250 OP 250 PS 637: Performed by: PHYSICAL MEDICINE & REHABILITATION

## 2017-05-19 PROCEDURE — 97535 SELF CARE MNGMENT TRAINING: CPT | Mod: GO | Performed by: OCCUPATIONAL THERAPIST

## 2017-05-19 PROCEDURE — 36415 COLL VENOUS BLD VENIPUNCTURE: CPT | Performed by: PHYSICIAN ASSISTANT

## 2017-05-19 PROCEDURE — 40000225 ZZH STATISTIC SLP WARD VISIT: Performed by: SPEECH-LANGUAGE PATHOLOGIST

## 2017-05-19 PROCEDURE — 99207 ZZC CDG-CODE CATEGORY CHANGED: CPT | Performed by: PHYSICIAN ASSISTANT

## 2017-05-19 PROCEDURE — 97112 NEUROMUSCULAR REEDUCATION: CPT | Mod: GP | Performed by: REHABILITATION PRACTITIONER

## 2017-05-19 RX ORDER — WARFARIN SODIUM 3 MG/1
3 TABLET ORAL
Status: COMPLETED | OUTPATIENT
Start: 2017-05-19 | End: 2017-05-19

## 2017-05-19 RX ADMIN — OXYCODONE HYDROCHLORIDE AND ACETAMINOPHEN 500 MG: 500 TABLET ORAL at 07:54

## 2017-05-19 RX ADMIN — WARFARIN SODIUM 3 MG: 3 TABLET ORAL at 17:00

## 2017-05-19 RX ADMIN — BUMETANIDE 0.5 MG: 0.5 TABLET ORAL at 17:47

## 2017-05-19 RX ADMIN — ZINC SULFATE CAP 220 MG (50 MG ELEMENTAL ZN) 220 MG: 220 (50 ZN) CAP at 07:54

## 2017-05-19 RX ADMIN — SENNOSIDES AND DOCUSATE SODIUM 2 TABLET: 8.6; 5 TABLET ORAL at 21:05

## 2017-05-19 RX ADMIN — CETIRIZINE HYDROCHLORIDE 10 MG: 5 TABLET ORAL at 07:53

## 2017-05-19 RX ADMIN — ATORVASTATIN CALCIUM 40 MG: 40 TABLET, FILM COATED ORAL at 21:05

## 2017-05-19 RX ADMIN — INSULIN GLARGINE 25 UNITS: 100 INJECTION, SOLUTION SUBCUTANEOUS at 07:59

## 2017-05-19 RX ADMIN — PANTOPRAZOLE SODIUM 40 MG: 20 TABLET, DELAYED RELEASE ORAL at 07:55

## 2017-05-19 RX ADMIN — BUMETANIDE 0.5 MG: 0.5 TABLET ORAL at 09:47

## 2017-05-19 RX ADMIN — TAMSULOSIN HYDROCHLORIDE 0.4 MG: 0.4 CAPSULE ORAL at 07:54

## 2017-05-19 RX ADMIN — CLOPIDOGREL BISULFATE 75 MG: 75 TABLET, FILM COATED ORAL at 07:59

## 2017-05-19 RX ADMIN — SENNOSIDES AND DOCUSATE SODIUM 2 TABLET: 8.6; 5 TABLET ORAL at 07:59

## 2017-05-19 RX ADMIN — MELATONIN TAB 3 MG 3 MG: 3 TAB at 21:37

## 2017-05-19 RX ADMIN — ACETAMINOPHEN 650 MG: 325 TABLET ORAL at 21:37

## 2017-05-19 RX ADMIN — Medication 25000 UNITS: at 07:54

## 2017-05-19 RX ADMIN — DIGOXIN 125 MCG: 125 TABLET ORAL at 07:54

## 2017-05-19 RX ADMIN — Medication 12.5 MG: at 09:47

## 2017-05-19 NOTE — PROGRESS NOTES
[facility], [place]   Physical Medicine and Rehabilitation Daily Note    51-year-old with functional impairments in mobility, activities of daily living and mild cognition following month and a half hospital stay starting with significant myocardial infarction with cardiogenic shock, but also ischemic stroke watershed distribution affecting the left side greater than right. Admitted to acute rehab 5/12/17.           Assessment and Plan of Care:     Eating picked up and several meals not recorded by family brought in. Removed nasal feeding tube today. Track weights  With family brining in food and his baseline awareness will try to manage without aspart. Diabetic educator to explore his / his wife's capacities in management options.    Urine retention: persists since trail dawkins out on Wed AM. Several times needing catheterization. Will add tamsulosin but monitor close for exacerbating orthostatic hypotension.  Holding lisinopril during this.    Dispo: CC next Tue after d/c. D/c about 1 week. Ongoing therapy TBD.    Continue therapies and plan of care    Medical issues:  Appreciate hospitalist consult, reviewed plan with Chula Rascon.  Cards: lisinopril on hold, low dose bumex 0.5 BID and spironolactone 12.5 daily, digoxin 125 mcg daily. No BB for now with recent heart failure decompensation.  plavix for 1 year with stent.  Continues with warfarin target INR 2-3.  Hyperlipidemia on statin  Stage III coccygeal pressure ulcer continues with mepilex and offloading, right upper chest open wound still from recent vascular access and balloon pump, nugauze packing in tract.   Tobacco dependence, doing ok with urges.         Review of Systems:   Dawkins out and still some slow flow and incomplete emptying. Not burning.  Nasal feeding tube removal felt OK  No shortness of breath or chest pain.  Balance improving some.  Sleeping bit better.          Physical Exam:     Vitals:    05/18/17 0843  05/18/17 0854 05/18/17 1203 05/18/17 1540   BP: 104/75 113/84 109/78 90/61   BP Location: Right arm Right arm Left arm Left arm   Pulse: 111 110  116   Resp: 16      Temp: 97.7  F (36.5  C)   98  F (36.7  C)   TempSrc: Oral   Oral   SpO2: 100%   98%   Weight:       Height:         Alert, conversant. No diaphoresis  FT removed by me today, no difficulties  Heart RRR  Lungs clear         Data:   Scheduled meds    cetirizine  10 mg Oral Daily     tamsulosin  0.4 mg Oral Daily     [START ON 5/19/2017] beta carotene  25,000 Units Oral Daily     [START ON 5/19/2017] ascorbic acid  500 mg Oral Daily     [START ON 5/19/2017] zinc sulfate  220 mg Oral Daily     bumetanide  0.5 mg Oral BID     pantoprazole  40 mg Oral Daily     senna-docusate  2 tablet Oral BID     atorvastatin  40 mg Oral or Feeding Tube QPM     clopidogrel  75 mg Oral Daily     digoxin  125 mcg Oral Daily     insulin glargine  25 Units Subcutaneous QAM AC     spironolactone  12.5 mg Oral Daily     insulin aspart  1-7 Units Subcutaneous TID AC     insulin aspart  1-5 Units Subcutaneous At Bedtime       PRN meds:  alum & mag hydroxide-simethicone, diphenhydrAMINE, insulin aspart, bisacodyl, polyethylene glycol, acetaminophen, melatonin, glucose **OR** dextrose **OR** glucagon, Warfarin Therapy Reminder, - MEDICATION INSTRUCTIONS -, IV fluid REPLACEMENT ONLY       Coordination of care:  20 minutes  Face-to-face:              20 minutes  Total time:                  40 minutes

## 2017-05-19 NOTE — PLAN OF CARE
Problem: Goal/Outcome  Goal: Goal Outcome Summary  Outcome: No Change  Pt A/O X 4. VSS. Sleep: normal. Physical assessment at baseline, refer to flow sheets. Denies nausea, shortness of breath, and chest pain. Denies all other pain. Dressing to chest and coccyx are clean, dry, and intact. Attempted to void at 0145, bladder scan showed 425 mL in bladder, patient attempted to double void. Patient was unable to void, straight cath for 500 mL at 0300. Is on a calorie count, 3g sodium diet, no meal this shift. Bowels- audible and active in all four quadrants, is passing flatus, last BM 5/18. Pt up CGA with walker. No PIV access. Pt is able to make needs known and the call light is within the pt's reach. Bed alarm on for safety. Continue to monitor.

## 2017-05-19 NOTE — PLAN OF CARE
Problem: Goal/Outcome  Goal: Goal Outcome Summary  Outcome: Therapy, progress toward functional goals as expected  OT: -13 min due to no , but able to utilize  iPad. Patient speaks English very well but is difficult to understand him at times. He completed bed mobility, LE dressing, ADL routine at sink, tub transfers all with SBA and use of WW. Plans to have full-time assist from wife and in-laws upon return home and plans to install 2 grab bars at tub/shower. Progressing well with all ADL and mobility. 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 Denied dizziness during session; /80.

## 2017-05-19 NOTE — PROGRESS NOTES
Acute Rehabilitation Unit  Internal Medicine - Daily Progress Note    Luke Henao MRN# [id number]   YOB: 1965 Age: 51 year old   Date of Admission: 5/12/2017  Date of Service: 5/19/2017         Assessment and Recmmendations:   Mr. Luke Henao is a 50 yo male with hx of smoking and DM admitted initially to [facility] on 3/26 with cardiogenic shock s/p PCI of RCA and placement of balloon pump, transferred to [facility] 2/2 inferior wall STEMI, now s/p placement of seven FEMI to LAD, LCx and RCA on 3/27 with post-procedure c/b recurrent cardiogenic shock, finally resolved s/p MitraClip placement on 5/1 2/2 severe ischemic MR, transferred to ARU on 5/12 for aggressive rehabilitation.      CAD, Ischemic cardiomyopathy, recurrent cardiogenic shock (resolved) - Developed resultant ischemic cardiomyopathy secondary to inferior wall STEMI and had 7 FEMI to LAD, LCx, RCA on 3/27. Change in EKG seen on 4/1 and repeat angio was performed showing LCx was  and was noted to be re-occluded (previously opened on 3/27). Despite PCI the patient continued to have refractory cardiogenic shock and inability to wean from the IABP. MitraClip placed on 5/1 for ischemic mitral reguritation. The IABP was slowly weaned post procedure. Echo 5/1 with inferior hypokinesis and est EF 30-35%. Discontinued IABP on 5/5.  ARU course complicated by softer BPs and hypovolemia therefore Lisinopril d/c'd 5/18. Maintained on 1800mL fluid restriction. Weight 122 lbs up from 120 lbs, goal ~118 lbs. No current cardiac complaints. Has ongoing urinary retention (see below) and was started on Flomax. Current BPs 100s-110s/70s-80s.     - Continue 1800 mL fluid restriction.  - If any concerns about hypovolemia or decreased PO intake in coming days, can consider D/C Bumex vs liberalizing fluid restriction to 2L.  - Continue Bumex 0.5 mg PO BID, HOLD for SBP <100, watch weights and BP closely.  - Recommend Cardiology follow up for medication  "optimization next week  - PT/OT per PM & R  - Daily standing weights with strict I/O  - Continue Digoxin 125 mcg PO daily  - Continue Spironolactone 12.5 mg PO daily  - Continue FR 1.8 L per day   - Continue Plavix x 1 year  - Beta blocker being held in the setting of recent heart failure decompensation and will be added back as an outpatient    Urinary Retention - No history of BPH. Required dawkins catheter for much of hospitalization. D/c'd several days prior to discharge and since has had intermittent retention likely secondary to prolonged dawkins. Urinary retention noted on 5/12 therefore dawkins reinserted per primary team. Dawkins removed 5/17 and continues to have retention requiring straight cath. Flomax started 5/18. Pt unable to void and straight catheterized for 403-500 cc on 5/19 despite Flomax.  - Continue Flomax 0.4 mg PO daily. Monitor closely for exacerbating orthostatic hypotension. Holding Lisinopril during trial of Flomax.  - Would recommend OP follow up with Urology  - Bladder scanning and straight cath per unit protocol     Type 2 diabetes mellitus - Hgb A1C 7.5 on admission. Initially on insulin gtt and transitioned to Lantus and Novolog sliding scale. Current glucoses . Unknown what his home regimen was. Per patient, an MD at a free clinic [place] gave him insulin at one point but patient states he \"threw it out\". Difficult for staff to do carb counting as patient has food brought in from home since removal of nasal feeding tube on 5/18 and unknown amount of carbs in food. Nevertheless, he has received very little aspart this week (~1 unit) and his glucoses have been in the 100s.  - At discharge, may need sliding scale which he should have teaching for if he does not have supplies at home, please consult DM educator.  - Patient will need to be established with a PCP at D/C, appreciate CC/SW assistance with this.  - Continue with Lantus 25 units qam   - Cont Novolog medium SSI  - " Monitor sugars TID before meals, qhs and 0200    Mitral regurg s/p MitraClip - Developed ischemic mitral regurgitation as a result of his STEMI. D/t refractory cardiogenic shock and inability to wean from IABP, a MitraClip procedure was performed.   - Needs lifelong antiplatelet with plan to continue Plavix for 1 year for FEMI then change to ASA.      H/o CVA - CVA developed left hand weakness and pain on 4/29 and a stroke code was called. CTA head/neck showed no hemorrhage but new areas of low attenuation in b/l hemispheres. Neuro consulted.   - Continuing Plavix and statin for prevention  - PT/OT per PM&R       Hx of acute DVT - During hospitalization pt developed a non-occlusive clot in left IJ and left common femoral vein. Started on Coumadin with bridging heparin gtt low intensity. DVT considered provoked in the setting of prolonged hospitalization and critical illness and therefore warfarin course should be ~3-6 months. INR goal 2-3, 2.43 today. Lovenox started 5/14 for subtherapeutic INR and D/Cd 5/17.   - Pharmacy consulted to dose daily warfarin based on serial INRs. Appreciate assistance.  - F/u in warfarin clinic after discharge      Non-severe protein- calorie malnutrition - Within in the context of acute illness. Started on TFs in hospital of which he is tolerating. Calorie counts ongoing. Recent count: 5/12: 536 kcal, 5/13: 810 kcal, 5/14: 895 kcal, 5/15: 350 kcal, 5/17: 1436, 5/18: 1365. Feeding tube d/c'd 5/18. Pt reports PO intake/appetite has improved and is at baseline.  - Nutrition consulted and appreciate following, will follow calorie counts, weight, protein intake, wound healing  - Per nutrition rec's, increased Ensure Clear to TID between meals  - Continue vitamin/mineral supplementation per PI protocol  - Continue low (3 gm) Na diet        Sinus tachycardia - Persistent during hospitalization. No current evidence of infection. Likely from recovering cardiac function. Ongoing HR 100s.  Not on  beta blocker given recent heart failure decompensation.     - Continue to monitor  - Will be started on a betablocker as an outpatient as cardiac function/heart failure continues to improve     Hx of Lower GIB - Had several maroon BMs since 4/11, Hgb overall dropped by a 1.5 grams or so, heparin infusion was held and GI consulted. Colonoscopy 4/17 showed a rectal ulcer thought secondary to retal tube but no intervenable lesions. Hgb remains stable and no recurrence of melena. Hgb 10.7 (9.8) on 5/15.  - Continue on Protonix 40 PO qD  - Monitor CBC qMonday      Insomnia - Difficulty sleeping intermittently throughout admission. Pt reports difficulty falling asleep.  - Melatonin qHS PRN    Resolved Hospital Problems.   Hypoxic respiratory failure, resolved - Initially presented intubated after STEMI. Extubated on 4/3. Re-intubated in the setting of worsening hypoxia on 4/25, extubated 4/28. Thought to be aspiration pneumonia/concomittant HCAP. Had already been treated with broad spectrum antibiotic starting 4/19. Completed 2 week course. Continued to improve and on O2 for comfort upon transfer. Currently denies SOB and O2 sats on RA WNL.  Urinary tract infection, resolved - UA 4/15 with cloudy urine, 50 WBCs, many bacteria, no nitrates. UCx grew E coli - treated with CFTX (sensitive). Fever on 4/20. Currently afebrile. Completed Vanc and pip/tazo treatment course on 5/1.     I personally examined Luke Henao today, and reviewed vital signs, medications and pertinent labs or imaging. Thank you for this opportunity to be involved in your patient's care.   Recommendations reviewed with attending physician Dr. Bruner and communicated to primary team via this note.   Christiano Dallas PA-C  Hospital Medicine  Pager: [phone]     Interval History:     No acute events overnight.  Feeding tube was removed yesterday. Luke is eating well when his wife brings food to him each day. Overall, Luke is doing well. Denies fever, chest  pain, shortness of breath, abdominal pain, nausea, vomiting, diarrhea, or swelling in legs and feet. He reports poor sleep last night and that he misses his 4-children.          Review of Systems:   A 10 point ROS was performed and negative unless otherwise noted in HPI.           Medications:     Current Facility-Administered Medications   Medication     cetirizine (zyrTEC) tablet 10 mg     tamsulosin (FLOMAX) capsule 0.4 mg     beta carotene capsule 25,000 Units     ascorbic acid (VITAMIN C) tablet 500 mg     zinc sulfate (ZINCATE) capsule 220 mg     alum & mag hydroxide-simethicone (MYLANTA ES/MAALOX  ES) suspension 30 mL     bumetanide (BUMEX) tablet 0.5 mg     pantoprazole (PROTONIX) EC tablet 40 mg     diphenhydrAMINE (BENADRYL) capsule 25-50 mg     senna-docusate (SENOKOT-S;PERICOLACE) 8.6-50 MG per tablet 2 tablet     bisacodyl (DULCOLAX) Suppository 10 mg     polyethylene glycol (MIRALAX/GLYCOLAX) Packet 17 g     acetaminophen (TYLENOL) tablet 650 mg     atorvastatin (LIPITOR) tablet 40 mg     clopidogrel (PLAVIX) tablet 75 mg     digoxin (LANOXIN) tablet 125 mcg     insulin glargine (LANTUS) injection 25 Units     melatonin tablet 3 mg     spironolactone (ALDACTONE) half-tab 12.5 mg     glucose 40 % gel 15-30 g    Or     dextrose 50 % injection 25-50 mL    Or     glucagon injection 1 mg     insulin aspart (NovoLOG) inj (RAPID ACTING)     insulin aspart (NovoLOG) inj (RAPID ACTING)     Warfarin Therapy Reminder (Check START DATE - warfarin may be starting in the FUTURE)     Patient is already receiving anticoagulation with heparin, enoxaparin (LOVENOX), warfarin (COUMADIN)  or other anticoagulant medication     dextrose 10 % 1,000 mL infusion            Physical Exam:   Blood pressure 102/72, pulse 104, temperature 96.3  F (35.7  C), temperature source Oral, resp. rate 16, height 1.524 m (5'), weight 55.6 kg (122 lb 8 oz), SpO2 99 %.  Body mass index is 23.92 kg/(m^2).  GENERAL: Alert and oriented x 3.  Participating in therapy, standing and sitting.   HEENT: Anicteric sclera. PERRL. Mucous membranes moist and without lesions.   CV: RRR. S1, S2. No murmurs appreciated.   RESPIRATORY: Effort normal on RA. Lungs CTAB with no wheezing, rales, rhonchi.   GI: Abdomen soft and non distended, bowel sounds present. No tenderness, rebound, guarding.    MUSCULOSKELETAL: No joint swelling or tenderness. Moves all extremities.    NEUROLOGICAL: No focal deficits. Strength 5/5 bilaterally in upper and lower extremities.   EXTREMITIES: No peripheral edema. Intact bilateral pedal pulses.   SKIN: No jaundice. No rashes.         Data:   I personally reviewed the following studies:    ROUTINE IP LABS (Last four results)  CMP     Recent Labs  Lab 05/15/17  0700      POTASSIUM 4.4   CHLORIDE 108   CO2 25   ANIONGAP 7   *   BUN 23   CR 0.68   ROB 8.8   MAG 1.8   PHOS 4.4     CBC     Recent Labs  Lab 05/15/17  0700   WBC 7.9   RBC 3.55*   HGB 10.7*   HCT 33.4*   MCV 94   MCH 30.1   MCHC 32.0   RDW 16.3*        INR     Recent Labs  Lab 05/19/17  0658 05/18/17  0737 05/17/17  0639 05/16/17  0830   INR 2.43* 2.36* 2.48* 2.00*         Unresulted Labs Ordered in the Past 30 Days of this Admission     Date and Time Order Name Status Description    4/28/2017 0924 AFB Culture Non Blood Preliminary     4/27/2017 1139 AFB Culture Non Blood Preliminary     4/26/2017 1250 AFB Culture Non Blood -- BAL Site 1 Preliminary     4/26/2017 1021 Fungus Culture, non-blood - BAL Site 1 Preliminary

## 2017-05-19 NOTE — PLAN OF CARE
"Problem: Goal/Outcome  Goal: Goal Outcome Summary  FOCUS/GOAL  Medical management     ASSESSMENT, INTERVENTIONS AND CONTINUING PLAN FOR GOAL:  Pt alert, sleeping from about 4pm to 7pm. BG 67 before dinner, pt states he isn't hungry and doesn't want to eat yet. Educated pt and family of our protocol, risks, s/s hypoglycemia. He finally accepted to eat soup that wife brought and apple juice. Finished mixed berry ensure. At bedtime working on apple ensure. Offered toilet, he refused stating he doesn't have to go yet which was about 5 hours after last cath. Bladder scan at this time 403. Later used call light to void 450, , refused to double void or to cath. Daughter present and helping interpret, states \"He's very stubborn.\" Wound care done. Refused senokot stating he had BM today. States \"pee med\" has helped and he feels better after voiding. Alarms on bed.      "

## 2017-05-19 NOTE — PLAN OF CARE
Problem: Goal/Outcome  Goal: Goal Outcome Summary  Outcome: Therapy, progress toward functional goals as expected  SLP: pt had much difficulty with reasoning/sequencing task, putting in order 6 pictured stimuli.  Even with cues and discussing details, he was unable to determine proper order.  Pt began crying in pm session when talking about his past/[facility]

## 2017-05-19 NOTE — PLAN OF CARE
Problem: Goal/Outcome  Goal: Goal Outcome Summary  Outcome: Improving  FOCUS/GOAL  Bladder management, Medical management and Mobility     ASSESSMENT, INTERVENTIONS AND CONTINUING PLAN FOR GOAL:  Pt's vitals stable. Tachycardia per baseline. Pt able to transfer and walk to the toilet with SBA-CGA using a walker. Mepilex on coccyx, CDI. Dressing over right chest, CDI. Scabs on left neck and ruthie groin. Pt's BGs 135 and 144. Pt just got done eating food from home when BG checked at noon. Pt encouraged to have staff check BGs before he eats. @nd day of Flomax. Pt still voiding small amounts.  PVR 561cc this morning. Straight cathed for 650cc. Pt voided again later.  cc. Pt declined straight cathing and wanted to try voiding again.

## 2017-05-20 LAB
GLUCOSE BLDC GLUCOMTR-MCNC: 111 MG/DL (ref 70–99)
GLUCOSE BLDC GLUCOMTR-MCNC: 114 MG/DL (ref 70–99)
GLUCOSE BLDC GLUCOMTR-MCNC: 118 MG/DL (ref 70–99)
GLUCOSE BLDC GLUCOMTR-MCNC: 128 MG/DL (ref 70–99)
GLUCOSE BLDC GLUCOMTR-MCNC: 136 MG/DL (ref 70–99)
GLUCOSE BLDC GLUCOMTR-MCNC: 160 MG/DL (ref 70–99)
GLUCOSE BLDC GLUCOMTR-MCNC: 93 MG/DL (ref 70–99)
INR PPP: 2.66 (ref 0.86–1.14)

## 2017-05-20 PROCEDURE — 97110 THERAPEUTIC EXERCISES: CPT | Mod: GP | Performed by: PHYSICAL THERAPIST

## 2017-05-20 PROCEDURE — 85610 PROTHROMBIN TIME: CPT | Performed by: PHYSICIAN ASSISTANT

## 2017-05-20 PROCEDURE — 97112 NEUROMUSCULAR REEDUCATION: CPT | Mod: GP | Performed by: PHYSICAL THERAPIST

## 2017-05-20 PROCEDURE — 99232 SBSQ HOSP IP/OBS MODERATE 35: CPT | Performed by: PHYSICIAN ASSISTANT

## 2017-05-20 PROCEDURE — 40000225 ZZH STATISTIC SLP WARD VISIT: Performed by: REHABILITATION PRACTITIONER

## 2017-05-20 PROCEDURE — 40000133 ZZH STATISTIC OT WARD VISIT

## 2017-05-20 PROCEDURE — 25000132 ZZH RX MED GY IP 250 OP 250 PS 637: Performed by: PHYSICAL MEDICINE & REHABILITATION

## 2017-05-20 PROCEDURE — 97532 ZZHC SP COGNITIVE SKILLS EA 15 MIN: CPT | Mod: GN | Performed by: SPEECH-LANGUAGE PATHOLOGIST

## 2017-05-20 PROCEDURE — 00000146 ZZHCL STATISTIC GLUCOSE BY METER IP

## 2017-05-20 PROCEDURE — 36415 COLL VENOUS BLD VENIPUNCTURE: CPT | Performed by: PHYSICIAN ASSISTANT

## 2017-05-20 PROCEDURE — 12800006 ZZH R&B REHAB

## 2017-05-20 PROCEDURE — 25000132 ZZH RX MED GY IP 250 OP 250 PS 637: Performed by: NURSE PRACTITIONER

## 2017-05-20 PROCEDURE — 97530 THERAPEUTIC ACTIVITIES: CPT | Mod: GO

## 2017-05-20 PROCEDURE — 97535 SELF CARE MNGMENT TRAINING: CPT | Mod: GO

## 2017-05-20 PROCEDURE — 97532 ZZHC SP COGNITIVE SKILLS EA 15 MIN: CPT | Mod: GN | Performed by: REHABILITATION PRACTITIONER

## 2017-05-20 PROCEDURE — 25000132 ZZH RX MED GY IP 250 OP 250 PS 637: Performed by: PHYSICIAN ASSISTANT

## 2017-05-20 PROCEDURE — 40000193 ZZH STATISTIC PT WARD VISIT: Performed by: PHYSICAL THERAPIST

## 2017-05-20 PROCEDURE — 40000225 ZZH STATISTIC SLP WARD VISIT: Performed by: SPEECH-LANGUAGE PATHOLOGIST

## 2017-05-20 RX ORDER — LISINOPRIL 2.5 MG/1
2.5 TABLET ORAL DAILY
Status: DISCONTINUED | OUTPATIENT
Start: 2017-05-21 | End: 2017-05-23

## 2017-05-20 RX ORDER — WARFARIN SODIUM 2.5 MG/1
2.5 TABLET ORAL
Status: COMPLETED | OUTPATIENT
Start: 2017-05-20 | End: 2017-05-20

## 2017-05-20 RX ADMIN — CETIRIZINE HYDROCHLORIDE 10 MG: 5 TABLET ORAL at 07:51

## 2017-05-20 RX ADMIN — PANTOPRAZOLE SODIUM 40 MG: 20 TABLET, DELAYED RELEASE ORAL at 07:51

## 2017-05-20 RX ADMIN — OXYCODONE HYDROCHLORIDE AND ACETAMINOPHEN 500 MG: 500 TABLET ORAL at 07:51

## 2017-05-20 RX ADMIN — INSULIN GLARGINE 25 UNITS: 100 INJECTION, SOLUTION SUBCUTANEOUS at 07:50

## 2017-05-20 RX ADMIN — INSULIN ASPART 1 UNITS: 100 INJECTION, SOLUTION INTRAVENOUS; SUBCUTANEOUS at 21:10

## 2017-05-20 RX ADMIN — BUMETANIDE 0.5 MG: 0.5 TABLET ORAL at 16:16

## 2017-05-20 RX ADMIN — Medication 12.5 MG: at 07:51

## 2017-05-20 RX ADMIN — ALUMINUM HYDROXIDE, MAGNESIUM HYDROXIDE, AND DIMETHICONE 30 ML: 400; 400; 40 SUSPENSION ORAL at 12:16

## 2017-05-20 RX ADMIN — Medication 25000 UNITS: at 07:51

## 2017-05-20 RX ADMIN — DIGOXIN 125 MCG: 125 TABLET ORAL at 07:51

## 2017-05-20 RX ADMIN — TAMSULOSIN HYDROCHLORIDE 0.4 MG: 0.4 CAPSULE ORAL at 07:51

## 2017-05-20 RX ADMIN — CLOPIDOGREL BISULFATE 75 MG: 75 TABLET, FILM COATED ORAL at 07:51

## 2017-05-20 RX ADMIN — SENNOSIDES AND DOCUSATE SODIUM 2 TABLET: 8.6; 5 TABLET ORAL at 07:51

## 2017-05-20 RX ADMIN — WARFARIN SODIUM 2.5 MG: 2.5 TABLET ORAL at 18:06

## 2017-05-20 RX ADMIN — ZINC SULFATE CAP 220 MG (50 MG ELEMENTAL ZN) 220 MG: 220 (50 ZN) CAP at 07:51

## 2017-05-20 RX ADMIN — ATORVASTATIN CALCIUM 40 MG: 40 TABLET, FILM COATED ORAL at 20:52

## 2017-05-20 RX ADMIN — BUMETANIDE 0.5 MG: 0.5 TABLET ORAL at 07:59

## 2017-05-20 NOTE — PROGRESS NOTES
PM&R PROGRESS NOTE     Patient seen and examined today. He was observed in therapy session as well. Coordinated with team.      HPI/ACTIVE ISSUES   Luke Henao is a 51 year old male, admitted to [facility] ARU on 5/12/2017 with functional impairments in mobility, activities of daily living and mild cognition following month and a half hospital stay starting with significant myocardial infarction with cardiogenic shock, but also ischemic stroke watershed distribution affecting the left side greater than right.    Main issues today are   Luke is here for rehabilitation following a stroke. Functionally, Luke Henao is participating in the therapies in a motivated fashion. He is making good progress. Some LLE weakness, cognition deficits in reasoning and problem solving per SLP. Continue intense rehab.   Blood pressures on lower side but not symptomatic. On several medications including bumex, digoxin, lisinopril,   He is on coumadin as anticoagulant for Non-occlusive clot in left IJ and left common femoral vein for a period of 3-6 months.    Started tamsulosin yesterday and urine retention not clearly better yet but may take several days for more optimal benefit. Has still needed some straight catheterization though voiding some on his own. Explore if positioning changes success in emptying such as sitting / standing may be better.  Lisinopril on hold, continues bumex and spiranolactone low dose with hold parameters.  BG with reasonable control            REVIEW OF THE SYSTEMS   Total of ten systems reviewed, pertinent positives and negatives as follows  Denies chest pain fever chills rigors  Denies any shortness of breath   Denies any nausea or vomiting   Appetite good  Denies any lightheadedness or dizziness   Reports LLE weakness   On regular diet with thins   Denies any pain   Denies any sob or cough   Denies any new rashes   Mood euphoric   Needing to be straight cathed.  Slept well last night   Remainder of the review of  the systems was negative      Physical exam    Vital signs:  Temp: 99.1  F (37.3  C) Temp src: Oral BP: 108/76 Pulse: 121   Resp: 16 SpO2: 98 % O2 Device: None (Room air)   Height: 152.4 cm (5') Weight: 54.5 kg (120 lb 1.6 oz) (stand)  Estimated body mass index is 23.46 kg/(m^2) as calculated from the following:    Height as of this encounter: 1.524 m (5').    Weight as of this encounter: 54.5 kg (120 lb 1.6 oz).  HEENT NC AT PRTL EOM good   Neck supple  Heart S1S2  Lungs CTA  Abdomen  benign BS positive NT NR   LE no edema      UE good strength   LLE antigravity but with incoordination         REVIEWED THE MEDICATIONS BELOW   Current Facility-Administered Medications   Medication     cetirizine (zyrTEC) tablet 10 mg     tamsulosin (FLOMAX) capsule 0.4 mg     beta carotene capsule 25,000 Units     ascorbic acid (VITAMIN C) tablet 500 mg     zinc sulfate (ZINCATE) capsule 220 mg     alum & mag hydroxide-simethicone (MYLANTA ES/MAALOX  ES) suspension 30 mL     bumetanide (BUMEX) tablet 0.5 mg     pantoprazole (PROTONIX) EC tablet 40 mg     diphenhydrAMINE (BENADRYL) capsule 25-50 mg     senna-docusate (SENOKOT-S;PERICOLACE) 8.6-50 MG per tablet 2 tablet     bisacodyl (DULCOLAX) Suppository 10 mg     polyethylene glycol (MIRALAX/GLYCOLAX) Packet 17 g     acetaminophen (TYLENOL) tablet 650 mg     atorvastatin (LIPITOR) tablet 40 mg     clopidogrel (PLAVIX) tablet 75 mg     digoxin (LANOXIN) tablet 125 mcg     insulin glargine (LANTUS) injection 25 Units     melatonin tablet 3 mg     spironolactone (ALDACTONE) half-tab 12.5 mg     glucose 40 % gel 15-30 g    Or     dextrose 50 % injection 25-50 mL    Or     glucagon injection 1 mg     insulin aspart (NovoLOG) inj (RAPID ACTING)     insulin aspart (NovoLOG) inj (RAPID ACTING)     Warfarin Therapy Reminder (Check START DATE - warfarin may be starting in the FUTURE)     Patient is already receiving anticoagulation with heparin, enoxaparin (LOVENOX), warfarin (COUMADIN)  or  other anticoagulant medication     dextrose 10 % 1,000 mL infusion       Results for orders placed or performed during the hospital encounter of 05/12/17 (from the past 24 hour(s))   Glucose by meter   Result Value Ref Range    Glucose 116 (H) 70 - 99 mg/dL   Glucose by meter   Result Value Ref Range    Glucose 144 (H) 70 - 99 mg/dL   Glucose by meter   Result Value Ref Range    Glucose 111 (H) 70 - 99 mg/dL   Glucose by meter   Result Value Ref Range    Glucose 170 (H) 70 - 99 mg/dL   Glucose by meter   Result Value Ref Range    Glucose 93 70 - 99 mg/dL   INR   Result Value Ref Range    INR 2.66 (H) 0.86 - 1.14       Total of 25 min spent in the encounter, more than 50% in coordination and counseling on topics listed in the HPI

## 2017-05-20 NOTE — PROGRESS NOTES
PM&R Daily Note:    Participating well in therapies.  Blood pressures on lower side but not symptomatic.   Started tamsulosin yesterday and urine retention not clearly better yet but may take several days for more optimal benefit. Has still needed some straight catheterization though voiding some on his own. Explore if positioning changes success in emptying such as sitting / standing may be better.  Lisinopril on hold, continues bumex and spiranolactone low dose with hold parameters.  BG with reasonable control.     Eating continue to encourage PO intake. FT our on Thursday. Dwain counts in process. Supplements continue.     Vitals:    05/19/17 0751 05/19/17 0945 05/19/17 1300 05/19/17 1741   BP: 102/72 112/75 105/63 108/76   BP Location: Right arm Right arm Right arm Right arm   Pulse: 104 100 106 121   Resp: 16   16   Temp: 96.3  F (35.7  C)   99.1  F (37.3  C)   TempSrc: Oral   Oral   SpO2: 99%   98%   Weight:       Height:         Alert, pleasant  Saw in room and later in OT session with dynavision.   Demonstrating vision perception decent in all quadrants.  Standing balance during task reasonable had walker present but not needed  Processing time and hand movements mildly slower with this test.  FT out of nose.  Breathing easy, no cough or wheeze.

## 2017-05-20 NOTE — PLAN OF CARE
Problem: Goal/Outcome  Goal: Goal Outcome Summary  Outcome: Improving  FOCUS/GOAL  Bladder management, Nutrition/Feeding/Swallowing precautions, Medical management and Cognition/Memory/Judgment/Problem solving     ASSESSMENT, INTERVENTIONS AND CONTINUING PLAN FOR GOAL:  Used call light appropriately to ask assistance with transfer CGA using walker , spontaneous voids in the toilet but still having elevated PVR he voided 2x this evening at 1900 the PVR was 390 pt declined to be st cath , then he voided again at 2115 this time the PVR is better which is 251 will continue to monitor PVR .  Appetite is good and ate all the food that his wife brought for him , no sliding scale needed for his Bgs .

## 2017-05-20 NOTE — PLAN OF CARE
Problem: Goal/Outcome  Goal: Goal Outcome Summary  Outcome: Therapy, progress toward functional goals as expected  Patient remains motivated during PT today, able to walk up/down 12 stairs with CGA, hand on ruthie HR, able to ambulate for 150 feet with RW and CGA, and performs sit-stands with RW and CGA-SBA and good sequencing.

## 2017-05-20 NOTE — PLAN OF CARE
Problem: Goal/Outcome  Goal: Goal Outcome Summary  Outcome: No Change  FOCUS/GOAL  Bowel management, Bladder management, Medical management and Mobility     ASSESSMENT, INTERVENTIONS AND CONTINUING PLAN FOR GOAL:  Pt's vitals stable this shift. BGs 128 and 118. No correction novolog needed. Pt able to void small amounts. Needed to be straight cathed x 2 this shift. Pt had fecal incontinence with soft watery stools during OT. Assisted pt with cleaning up incontinence and pull up applied. Hold senna-s. Pt c/o stomach upset. Prn maalox given. He didn't have any lunch but he drank his ensure clear supplement.

## 2017-05-20 NOTE — PROGRESS NOTES
Acute Rehabilitation Unit   Internal Medicine Progress Note     Patient: Luke Henao  MRN: [id number]  Admission Date: 5/12/2017  ARU Day # 8    Assessment & Plan: Luke Henao is a 51 year old man with a history of DM II and tobacco use who was admitted to [facility] 3/26/17 w/ cardiogenic shock 2/2 inferior wall STEMI and underwent RCA aspiration thrombectomy and balloon angioplasty, IABP and initiation of dopamine, temp pacer, and transferred to [facility] 3/27-5/11/17 for consideration of mechanical support. Had PCI w/ 7 FEMI to RCA, LAD, LCx and required epinephrine in addition to dopamine; post procedure had distributive shock picture from infection (possible aspiration PNA) vs. post-MI SIRS response. Later again developed cardiogenic shock requiring replacement of IABP and MitraClip for ischemic MR. Now transferred to ARU for ongoing rehab therapies.     CAD, ICM. S/p inferior wall STEMI and had 7 FEMI to RCA, LAD, LCx on 3/27/17. Refractory cardiogenic shock. S/p MitraClip on 5/1/17 for ischemic MR. IABP d/c'd on 5/5. Most recent echo 5/2 w/ EF 30-35%, basal inferolateral hypokinesis, less than mild residual MR, mild pulm HTN. ARU course c/b soft BPs so lisinopril (2.5 mg BID) d/c'd on 5/18. SBPs most recently 100s-110s and asymptomatic - improved. Weight 120 lbs (stable from 5/11 admit). No cardiac complaints at this time; no O2 needs or edema.   - Will attempt to resume lisinopril at just 2.5 mg daily w/ holding parameters.   - Continue Plavix x 1 year for FEMI then can change to lifelong ASA (MitraClip).   - Continue digoxin.   - Continue spironolactone 12.5 mg daily and Bumex 0.5 mg BID (was 1 mg BID on admit) w/ daily weights, I&Os, and 3 g Na/1800 cc fluid restrictions.   - Continue statin.   - No beta blocker in setting of recent HF decompensation; will be added back as OP.   - Cardiology not following here but would d/w HF rounding team if issues.     Urinary Retention. No known hx BPH. Prolonged  hospitalization w/ Chavis catheter. Was treated for E coli UTI; UA clear since. Intermittent urinary retention since d/c of Chavis prior to hospital d/c. Chavis replaced 5/12 and removed again 5/17. Flomax started 5/18. Still w/ intermittent retention - seems improved w/ Flomax and double void.   - Continue Flomax w/ close monitoring of BPs.   - Encourage double void.   - PRN straight cath for PVR > 300 cc.   - Will need OP urology f/u after d/c.     DM II. HgbA1c 7.5%. Unknown home PTA regimen - apparently got insulin from a free clinic but threw it away. BG controlled here on Lantus 25 units daily; not really using any sliding scale coverage.   - Continue Lantus 25 units daily.   - Will follow BG but likely able to stop correction scale over coming days given doubt that he is able to manage this or carb coverage at home.   - Needs glucometer, diabetic education, and PCP prior to d/c. May be able to start oral regimen instead as OP.     Provoked DVT. Developed non occlusive clot in left IJ and left common femoral vein during acute hospitalization. Continue warfarin w/ INR goal 2-3 for anticipated 3-6 mo course.     Non Severe Protein Calorie Malnutrition. Feeding tube d/c'd 5/18. Nutrition following.     Sinus Tachycardia. Persistent throughout acute hospitalization and ARU course. Attributed to deconditioning and recovering cardiac function. Not on beta blocker d/t recent HF exacerbation. Will be followed OP.     CVA. Developed left hand weakness and pain on 4/29 prompting stroke code. Head/neck CTA showed no hemorrhage but new areas of low attenuation in b/l hemispheres. Neuro consulted. On Plavix and statin for prevention.     Resolved Problems:   Lower GIB. Maroon BMs starting 4/11 w/ drop in hgb by ~1.5 g. Colonoscopy 4/17 showed rectal ulcer thought 2/2 tube but no lesions to intervene upon. Hgb stable w/o recurrence since. On Protonix for prophylaxis.   Hypoxic Respiratory Failure. Intubated 3/26-4/3 in setting  of cardiogenic shock, and then re-intubated 4/25-4/28 d/t worsening hypoxia thought 2/2 aspiration PNA vs HCAP. Completed Zosyn 4/19-4/26, vanco 4/20-4/29, meropenem 4/26-5/2. Now on RA.     Medicine will follow.    Keira ZamudioStordenMisty Ty PA-C  Hospitalist Service  Pager: [phone]  ________________________________________________________________    Subjective & Interval History:  No complaints today. Reports feeling well. About to work w/ therapy. No CP or SOB. Feels that voiding is slowly improving. No diarrhea or constipation. Ok appetite.      Last 24 hour care team notes reviewed.   ROS: 4 point ROS (including Respiratory, CV, GI and ) was performed and negative unless otherwise noted in HPI.     Medications: Reviewed in EPIC.    Physical Exam:    Blood pressure 113/76, pulse 105, temperature 96.5  F (35.8  C), temperature source Oral, resp. rate 17, height 1.524 m (5'), weight 54.5 kg (120 lb 1.6 oz), SpO2 100 %.    GENERAL: Alert and oriented x 3. Sitting up in bed, appears comfortable. Interpretor present but also understands some English. Pleasant and conversant.   HEENT: Anicteric sclera. Mucous membranes moist.   CV: RRR. S1, S2. No murmurs appreciated.   RESPIRATORY: Effort normal on room air. Lungs CTAB.  GI: Abdomen soft, non distended, non tender.   NEUROLOGICAL: No focal deficits. Moves all extremities.    EXTREMITIES: No peripheral edema. Intact bilateral pedal pulses.   SKIN: No jaundice. No rashes.     Labs & Studies of Note: I personally reviewed the following studies:  Blood glucose  INR    Unresulted Labs Ordered in the Past 30 Days of this Admission     Date and Time Order Name Status Description    4/28/2017 0924 AFB Culture Non Blood Preliminary     4/27/2017 1139 AFB Culture Non Blood Preliminary     4/26/2017 1250 AFB Culture Non Blood -- BAL Site 1 Preliminary     4/26/2017 1021 Fungus Culture, non-blood - BAL Site 1 Preliminary

## 2017-05-20 NOTE — PLAN OF CARE
Problem: Goal/Outcome  Goal: Goal Outcome Summary  Outcome: Therapy, progress toward functional goals as expected  SLP; pt has mild difficulty determining problem in pictured stimuli.  Needs cues for more detailed thought and attention.  At end of session pt has bowel urgency - wife helped him to toilet.

## 2017-05-20 NOTE — PLAN OF CARE
Problem: Goal/Outcome  Goal: Goal Outcome Summary  Outcome: No Change  Alert and oriented to self,place an time, speaks limited English enough to respond to assessment questions.Repositioning in bed ind, amb to the BR with CGA, continent of bladder on the toilet,  Patient wife brought some for him at about 0500, BG check prior to eating 93, slept most of the night, will continue POC

## 2017-05-21 LAB
GLUCOSE BLDC GLUCOMTR-MCNC: 126 MG/DL (ref 70–99)
GLUCOSE BLDC GLUCOMTR-MCNC: 200 MG/DL (ref 70–99)
GLUCOSE BLDC GLUCOMTR-MCNC: 210 MG/DL (ref 70–99)
GLUCOSE BLDC GLUCOMTR-MCNC: 249 MG/DL (ref 70–99)
GLUCOSE BLDC GLUCOMTR-MCNC: 98 MG/DL (ref 70–99)
INR PPP: 2.47 (ref 0.86–1.14)

## 2017-05-21 PROCEDURE — 00000146 ZZHCL STATISTIC GLUCOSE BY METER IP

## 2017-05-21 PROCEDURE — 97532 ZZHC SP COGNITIVE SKILLS EA 15 MIN: CPT | Mod: GN | Performed by: REHABILITATION PRACTITIONER

## 2017-05-21 PROCEDURE — 25000132 ZZH RX MED GY IP 250 OP 250 PS 637: Performed by: PHYSICAL MEDICINE & REHABILITATION

## 2017-05-21 PROCEDURE — 97110 THERAPEUTIC EXERCISES: CPT | Mod: GP | Performed by: PHYSICAL THERAPIST

## 2017-05-21 PROCEDURE — 97112 NEUROMUSCULAR REEDUCATION: CPT | Mod: GP | Performed by: PHYSICAL THERAPIST

## 2017-05-21 PROCEDURE — 25000132 ZZH RX MED GY IP 250 OP 250 PS 637: Performed by: PHYSICIAN ASSISTANT

## 2017-05-21 PROCEDURE — 99232 SBSQ HOSP IP/OBS MODERATE 35: CPT | Performed by: PHYSICIAN ASSISTANT

## 2017-05-21 PROCEDURE — 40000193 ZZH STATISTIC PT WARD VISIT: Performed by: PHYSICAL THERAPIST

## 2017-05-21 PROCEDURE — 36415 COLL VENOUS BLD VENIPUNCTURE: CPT | Performed by: PHYSICIAN ASSISTANT

## 2017-05-21 PROCEDURE — 25000132 ZZH RX MED GY IP 250 OP 250 PS 637: Performed by: NURSE PRACTITIONER

## 2017-05-21 PROCEDURE — 12800006 ZZH R&B REHAB

## 2017-05-21 PROCEDURE — 85610 PROTHROMBIN TIME: CPT | Performed by: PHYSICIAN ASSISTANT

## 2017-05-21 PROCEDURE — 40000225 ZZH STATISTIC SLP WARD VISIT: Performed by: REHABILITATION PRACTITIONER

## 2017-05-21 PROCEDURE — 97530 THERAPEUTIC ACTIVITIES: CPT | Mod: GO

## 2017-05-21 PROCEDURE — 99207 ZZC CDG-MDM COMPONENT: MEETS MODERATE - UP CODED: CPT | Performed by: PHYSICIAN ASSISTANT

## 2017-05-21 PROCEDURE — 40000133 ZZH STATISTIC OT WARD VISIT

## 2017-05-21 RX ORDER — WARFARIN SODIUM 3 MG/1
3 TABLET ORAL
Status: COMPLETED | OUTPATIENT
Start: 2017-05-21 | End: 2017-05-21

## 2017-05-21 RX ADMIN — OXYCODONE HYDROCHLORIDE AND ACETAMINOPHEN 500 MG: 500 TABLET ORAL at 08:04

## 2017-05-21 RX ADMIN — INSULIN ASPART 1 UNITS: 100 INJECTION, SOLUTION INTRAVENOUS; SUBCUTANEOUS at 08:00

## 2017-05-21 RX ADMIN — ACETAMINOPHEN 650 MG: 325 TABLET ORAL at 20:13

## 2017-05-21 RX ADMIN — INSULIN ASPART 1 UNITS: 100 INJECTION, SOLUTION INTRAVENOUS; SUBCUTANEOUS at 22:06

## 2017-05-21 RX ADMIN — Medication 12.5 MG: at 08:03

## 2017-05-21 RX ADMIN — ZINC SULFATE CAP 220 MG (50 MG ELEMENTAL ZN) 220 MG: 220 (50 ZN) CAP at 08:04

## 2017-05-21 RX ADMIN — INSULIN GLARGINE 25 UNITS: 100 INJECTION, SOLUTION SUBCUTANEOUS at 08:01

## 2017-05-21 RX ADMIN — SENNOSIDES AND DOCUSATE SODIUM 2 TABLET: 8.6; 5 TABLET ORAL at 20:13

## 2017-05-21 RX ADMIN — ATORVASTATIN CALCIUM 40 MG: 40 TABLET, FILM COATED ORAL at 20:13

## 2017-05-21 RX ADMIN — TAMSULOSIN HYDROCHLORIDE 0.4 MG: 0.4 CAPSULE ORAL at 08:03

## 2017-05-21 RX ADMIN — BUMETANIDE 0.5 MG: 0.5 TABLET ORAL at 15:45

## 2017-05-21 RX ADMIN — LISINOPRIL 2.5 MG: 2.5 TABLET ORAL at 08:04

## 2017-05-21 RX ADMIN — CLOPIDOGREL BISULFATE 75 MG: 75 TABLET, FILM COATED ORAL at 08:03

## 2017-05-21 RX ADMIN — WARFARIN SODIUM 3 MG: 3 TABLET ORAL at 17:51

## 2017-05-21 RX ADMIN — BUMETANIDE 0.5 MG: 0.5 TABLET ORAL at 08:03

## 2017-05-21 RX ADMIN — DIGOXIN 125 MCG: 125 TABLET ORAL at 08:03

## 2017-05-21 RX ADMIN — PANTOPRAZOLE SODIUM 40 MG: 20 TABLET, DELAYED RELEASE ORAL at 08:04

## 2017-05-21 RX ADMIN — CETIRIZINE HYDROCHLORIDE 10 MG: 5 TABLET ORAL at 08:03

## 2017-05-21 RX ADMIN — Medication 25000 UNITS: at 08:03

## 2017-05-21 NOTE — PLAN OF CARE
Problem: Goal/Outcome  Goal: Goal Outcome Summary  Outcome: No Change  Alert and oriented to self, place and time. According to patient and  His wife, he voided a lot in the BR assisted by his wife to the BR, PVR was 300., not sure exactly the interval between when patient went to the BR and when he was bladder scanned.At 0342 patient voided 100 and was scanned for 505, patient refused to be straight cath and wants  to double voide in about an hour. Patient and his wife taught to notify staff so that he can be scanned immediately after he voided At about 0530, patient amdb with CGA with walker, sat on the toilet and voided 600, . Will continue to enc voiding

## 2017-05-21 NOTE — PLAN OF CARE
Problem: Goal/Outcome  Goal: Goal Outcome Summary  Outcome: Therapy, progress toward functional goals as expected  Pt making steady gains, still needs cues at times to attend to all details. Pt highly motivated and feels his cognition has improved.

## 2017-05-21 NOTE — PROGRESS NOTES
Acute Rehabilitation Unit   Internal Medicine Progress Note     Patient: Luke Henao  MRN: [id number]  Admission Date: 5/12/2017  ARU Day # 9    Assessment & Plan: Luke Henao is a 51 year old man with a history of DM II and tobacco use who was admitted to [facility] 3/26/17 w/ cardiogenic shock 2/2 inferior wall STEMI and underwent RCA aspiration thrombectomy and balloon angioplasty, IABP and initiation of dopamine, temp pacer, and transferred to [facility] 3/27-5/11/17 for consideration of mechanical support. Had PCI w/ 7 FEMI to RCA, LAD, LCx and required epinephrine in addition to dopamine; post procedure had distributive shock picture from infection (possible aspiration PNA) vs. post-MI SIRS response. Later again developed cardiogenic shock requiring replacement of IABP and MitraClip for ischemic MR. Now transferred to ARU for ongoing rehab therapies.     CAD, ICM. S/p inferior wall STEMI and had 7 FEMI to RCA, LAD, LCx on 3/27/17. Refractory cardiogenic shock. S/p MitraClip on 5/1/17 for ischemic MR. IABP d/c'd on 5/5. Most recent echo 5/2 w/ EF 30-35%, basal inferolateral hypokinesis, less than mild residual MR, mild pulm HTN. ARU course c/b soft BPs so lisinopril (2.5 mg BID) d/c'd on 5/18, resumed at 2.5 mg daily today. SBPs most recently 100s-110s and asymptomatic - improved. Weight 118 lbs (relatively stable during rehab course). No cardiac complaints at this time; no O2 needs or edema.   - Continue lisinopril 2.5 mg daily.   - Continue Plavix x 1 year for FEMI then can change to lifelong ASA (MitraClip).   - Continue digoxin.   - Continue spironolactone 12.5 mg daily and Bumex 0.5 mg BID (was 1 mg BID on admit) w/ daily weights, I&Os, and 3 g Na/1800 cc fluid restrictions.   - Continue statin.   - No beta blocker in setting of recent HF decompensation; will be added back as OP.   - Cardiology not following here but would d/w HF rounding team if issues.     Urinary Retention. No known hx BPH. Prolonged  hospitalization w/ Chavis catheter. Was treated for E coli UTI; UA clear since. Intermittent urinary retention since d/c of Chavis prior to hospital d/c. Chavis replaced 5/12 and removed again 5/17. Flomax started 5/18. Still w/ intermittent retention - seems improved w/ Flomax and double void.   - Continue Flomax w/ close monitoring of BPs.   - Continue double void.   - PRN straight cath for PVR > 300 cc and no success w/ double void.   - May need OP urology f/u after d/c if persists.     DM II. HgbA1c 7.5%. Unknown home PTA regimen - had been given insulin but did not think he needed it so threw it away; verbalized a much better understanding of this today. BG controlled here on Lantus 25 units daily; not really using any sliding scale coverage.  this AM was checked after eating so would ignore.   - Continue Lantus 25 units daily.   - Will follow BG but likely able to stop correction scale over coming days given doubt that he is able to manage this or carb coverage at home.   - Needs diabetic education and PCP prior to d/c. States he has a glucometer. May be able to start oral regimen instead as OP.     Provoked DVT. Developed non occlusive clot in left IJ and left common femoral vein during acute hospitalization. Continue warfarin w/ INR goal 2-3 for anticipated 3-6 mo course.     Non Severe Protein Calorie Malnutrition. Feeding tube d/c'd 5/18. Nutrition following.     Sinus Tachycardia. Persistent throughout acute hospitalization and ARU course. Attributed to deconditioning and recovering cardiac function. Not on beta blocker d/t recent HF exacerbation. Will be followed OP.     CVA. Developed left hand weakness and pain on 4/29 prompting stroke code. Head/neck CTA showed no hemorrhage but new areas of low attenuation in b/l hemispheres. Neuro consulted. On Plavix and statin for prevention.     Resolved Problems:   Lower GIB. Maroon BMs starting 4/11 w/ drop in hgb by ~1.5 g. Colonoscopy 4/17 showed rectal  ulcer thought 2/2 tube but no lesions to intervene upon. Hgb stable w/o recurrence since. On Protonix for prophylaxis.   Hypoxic Respiratory Failure. Intubated 3/26-4/3 in setting of cardiogenic shock, and then re-intubated 4/25-4/28 d/t worsening hypoxia thought 2/2 aspiration PNA vs HCAP. Completed Zosyn 4/19-4/26, vanco 4/20-4/29, meropenem 4/26-5/2. Now on RA.     Medicine will follow.    Keira Ty PA-C ([place])  Hospitalist Service  Pager: [phone]  ________________________________________________________________    Subjective & Interval History:  No complaints today. Reports feeling well. About to work w/ therapy. No CP or SOB. Is excited that he is voiding more - using double void. No diarrhea or constipation. Good appetite.     Last 24 hour care team notes reviewed.   ROS: 4 point ROS (including Respiratory, CV, GI and ) was performed and negative unless otherwise noted in HPI.     Medications: Reviewed in EPIC.    Physical Exam:    Blood pressure 111/87, pulse 111, temperature 97.6  F (36.4  C), temperature source Oral, resp. rate 18, height 1.524 m (5'), weight 53.8 kg (118 lb 9.6 oz), SpO2 98 %.    GENERAL: Alert and oriented x 3. Sitting up in bed, appears comfortable. Interpretor present but also understands some English. Pleasant and conversant.   HEENT: Anicteric sclera. Mucous membranes moist.   CV: RRR. S1, S2. No murmurs appreciated.   RESPIRATORY: Effort normal on room air. Lungs CTAB.  GI: Abdomen soft, non distended, non tender.   NEUROLOGICAL: No focal deficits. Moves all extremities.    EXTREMITIES: No peripheral edema. Intact bilateral pedal pulses.   SKIN: No jaundice. No rashes.     Labs & Studies of Note: I personally reviewed the following studies:  Blood glucose  INR    Unresulted Labs Ordered in the Past 30 Days of this Admission     Date and Time Order Name Status Description    4/28/2017 0924 AFB Culture Non Blood Preliminary     4/27/2017 1139 AFB Culture Non Blood  Preliminary     4/26/2017 1250 AFB Culture Non Blood -- BAL Site 1 Preliminary     4/26/2017 1021 Fungus Culture, non-blood - BAL Site 1 Preliminary

## 2017-05-21 NOTE — PLAN OF CARE
Problem: Goal/Outcome  Goal: Goal Outcome Summary  Outcome: No Change  FOCUS/GOAL  Bladder management, Medical management and Mobility     ASSESSMENT, INTERVENTIONS AND CONTINUING PLAN FOR GOAL:  Pt's  this morning. He said he had already eaten rice and chicken soup an hour before this so only 1 unit novolog correction given. Encouraged pt to please let staff know before he eats so his BGs will be accurate. He did call before eating his lunch brought by wife from home. . Vitals stable. Pt able to transfers with SBA-CGA with a walker. Mepilex on coccyx and dressing over right chest, CDI. Pt able to void more but still having residuals and needed to be straight cathed at end of morning shift at 1500 because he isn't able to void more and his PVRs kept getting higher. Continue to monitor.

## 2017-05-21 NOTE — PLAN OF CARE
Problem: Goal/Outcome  Goal: Goal Outcome Summary  Outcome: No Change  FOCUS/GOAL  Bowel management, Bladder management and Wound care management  Pt continues to experience urine retention; was able to void 400cc this shift with PVR of 315cc. Pt refused str cath and asked to try voiding later after fluid intake. Dressing on the right side of chest changed. CGA with walker to the BR for voiding. Denies pain; denies SOB. Please continue to encourage pt to double void.

## 2017-05-21 NOTE — PLAN OF CARE
"Problem: Goal/Outcome  Goal: Goal Outcome Summary  Outcome: Therapy, progress toward functional goals as expected  Patient attempted to descend 6\" tall stairs in therapy gym with Right hand on HR, however was buckling the right knee when lowering the left leg onto the step below hip. Patient able to descend the stairs without buckling the right knee with both hands on HRs. Pt CGA with transferring with RW, with walking with RW for 150 feet, and with negotiating stairs today.      "

## 2017-05-22 LAB
ANION GAP SERPL CALCULATED.3IONS-SCNC: 9 MMOL/L (ref 3–14)
BUN SERPL-MCNC: 16 MG/DL (ref 7–30)
CALCIUM SERPL-MCNC: 8.3 MG/DL (ref 8.5–10.1)
CHLORIDE SERPL-SCNC: 108 MMOL/L (ref 94–109)
CO2 SERPL-SCNC: 25 MMOL/L (ref 20–32)
CREAT SERPL-MCNC: 0.7 MG/DL (ref 0.66–1.25)
ERYTHROCYTE [DISTWIDTH] IN BLOOD BY AUTOMATED COUNT: 15.9 % (ref 10–15)
GFR SERPL CREATININE-BSD FRML MDRD: ABNORMAL ML/MIN/1.7M2
GLUCOSE BLDC GLUCOMTR-MCNC: 115 MG/DL (ref 70–99)
GLUCOSE BLDC GLUCOMTR-MCNC: 122 MG/DL (ref 70–99)
GLUCOSE BLDC GLUCOMTR-MCNC: 125 MG/DL (ref 70–99)
GLUCOSE BLDC GLUCOMTR-MCNC: 172 MG/DL (ref 70–99)
GLUCOSE SERPL-MCNC: 232 MG/DL (ref 70–99)
HCT VFR BLD AUTO: 34.8 % (ref 40–53)
HGB BLD-MCNC: 11 G/DL (ref 13.3–17.7)
INR PPP: 2.73 (ref 0.86–1.14)
MAGNESIUM SERPL-MCNC: 1.6 MG/DL (ref 1.6–2.3)
MCH RBC QN AUTO: 29.8 PG (ref 26.5–33)
MCHC RBC AUTO-ENTMCNC: 31.6 G/DL (ref 31.5–36.5)
MCV RBC AUTO: 94 FL (ref 78–100)
PHOSPHATE SERPL-MCNC: 2.5 MG/DL (ref 2.5–4.5)
PLATELET # BLD AUTO: 305 10E9/L (ref 150–450)
POTASSIUM SERPL-SCNC: 3.3 MMOL/L (ref 3.4–5.3)
RBC # BLD AUTO: 3.69 10E12/L (ref 4.4–5.9)
SODIUM SERPL-SCNC: 142 MMOL/L (ref 133–144)
WBC # BLD AUTO: 7.5 10E9/L (ref 4–11)

## 2017-05-22 PROCEDURE — 00000146 ZZHCL STATISTIC GLUCOSE BY METER IP

## 2017-05-22 PROCEDURE — 25000132 ZZH RX MED GY IP 250 OP 250 PS 637: Performed by: PHYSICAL MEDICINE & REHABILITATION

## 2017-05-22 PROCEDURE — 40000133 ZZH STATISTIC OT WARD VISIT: Performed by: STUDENT IN AN ORGANIZED HEALTH CARE EDUCATION/TRAINING PROGRAM

## 2017-05-22 PROCEDURE — 40000193 ZZH STATISTIC PT WARD VISIT: Performed by: STUDENT IN AN ORGANIZED HEALTH CARE EDUCATION/TRAINING PROGRAM

## 2017-05-22 PROCEDURE — T1013 SIGN LANG/ORAL INTERPRETER: HCPCS | Mod: U3

## 2017-05-22 PROCEDURE — 84100 ASSAY OF PHOSPHORUS: CPT | Performed by: PHYSICIAN ASSISTANT

## 2017-05-22 PROCEDURE — 97150 GROUP THERAPEUTIC PROCEDURES: CPT | Mod: GP | Performed by: STUDENT IN AN ORGANIZED HEALTH CARE EDUCATION/TRAINING PROGRAM

## 2017-05-22 PROCEDURE — 40000225 ZZH STATISTIC SLP WARD VISIT: Performed by: SPEECH-LANGUAGE PATHOLOGIST

## 2017-05-22 PROCEDURE — S0138 FINASTERIDE, 5 MG: HCPCS | Performed by: PHYSICAL MEDICINE & REHABILITATION

## 2017-05-22 PROCEDURE — 36415 COLL VENOUS BLD VENIPUNCTURE: CPT | Performed by: PHYSICIAN ASSISTANT

## 2017-05-22 PROCEDURE — 25000132 ZZH RX MED GY IP 250 OP 250 PS 637: Performed by: NURSE PRACTITIONER

## 2017-05-22 PROCEDURE — 83735 ASSAY OF MAGNESIUM: CPT | Performed by: PHYSICIAN ASSISTANT

## 2017-05-22 PROCEDURE — 12800006 ZZH R&B REHAB

## 2017-05-22 PROCEDURE — 85027 COMPLETE CBC AUTOMATED: CPT | Performed by: PHYSICIAN ASSISTANT

## 2017-05-22 PROCEDURE — 97532 ZZHC SP COGNITIVE SKILLS EA 15 MIN: CPT | Mod: GN | Performed by: SPEECH-LANGUAGE PATHOLOGIST

## 2017-05-22 PROCEDURE — 25000132 ZZH RX MED GY IP 250 OP 250 PS 637: Performed by: PHYSICIAN ASSISTANT

## 2017-05-22 PROCEDURE — 80048 BASIC METABOLIC PNL TOTAL CA: CPT | Performed by: PHYSICIAN ASSISTANT

## 2017-05-22 PROCEDURE — 85610 PROTHROMBIN TIME: CPT | Performed by: PHYSICIAN ASSISTANT

## 2017-05-22 PROCEDURE — 99207 ZZC CDG-MDM COMPONENT: MEETS MODERATE - UP CODED: CPT | Performed by: PHYSICIAN ASSISTANT

## 2017-05-22 PROCEDURE — 99232 SBSQ HOSP IP/OBS MODERATE 35: CPT | Performed by: PHYSICIAN ASSISTANT

## 2017-05-22 PROCEDURE — 97530 THERAPEUTIC ACTIVITIES: CPT | Mod: GO | Performed by: STUDENT IN AN ORGANIZED HEALTH CARE EDUCATION/TRAINING PROGRAM

## 2017-05-22 RX ORDER — PANTOPRAZOLE SODIUM 40 MG/1
40 TABLET, DELAYED RELEASE ORAL
Status: DISCONTINUED | OUTPATIENT
Start: 2017-05-23 | End: 2017-05-26 | Stop reason: HOSPADM

## 2017-05-22 RX ORDER — LANOLIN ALCOHOL/MO/W.PET/CERES
3 CREAM (GRAM) TOPICAL AT BEDTIME
Status: DISCONTINUED | OUTPATIENT
Start: 2017-05-22 | End: 2017-05-23

## 2017-05-22 RX ORDER — WARFARIN SODIUM 2.5 MG/1
2.5 TABLET ORAL
Status: COMPLETED | OUTPATIENT
Start: 2017-05-22 | End: 2017-05-22

## 2017-05-22 RX ORDER — FINASTERIDE 5 MG/1
5 TABLET, FILM COATED ORAL DAILY
Status: DISCONTINUED | OUTPATIENT
Start: 2017-05-22 | End: 2017-05-26 | Stop reason: HOSPADM

## 2017-05-22 RX ADMIN — LISINOPRIL 2.5 MG: 2.5 TABLET ORAL at 07:49

## 2017-05-22 RX ADMIN — OXYCODONE HYDROCHLORIDE AND ACETAMINOPHEN 500 MG: 500 TABLET ORAL at 07:45

## 2017-05-22 RX ADMIN — MELATONIN TAB 3 MG 3 MG: 3 TAB at 21:16

## 2017-05-22 RX ADMIN — WARFARIN SODIUM 2.5 MG: 2.5 TABLET ORAL at 18:12

## 2017-05-22 RX ADMIN — DIGOXIN 125 MCG: 125 TABLET ORAL at 07:51

## 2017-05-22 RX ADMIN — ATORVASTATIN CALCIUM 40 MG: 40 TABLET, FILM COATED ORAL at 19:43

## 2017-05-22 RX ADMIN — FINASTERIDE 5 MG: 5 TABLET, FILM COATED ORAL at 09:21

## 2017-05-22 RX ADMIN — CLOPIDOGREL BISULFATE 75 MG: 75 TABLET, FILM COATED ORAL at 07:46

## 2017-05-22 RX ADMIN — Medication 12.5 MG: at 07:45

## 2017-05-22 RX ADMIN — CETIRIZINE HYDROCHLORIDE 10 MG: 5 TABLET ORAL at 07:47

## 2017-05-22 RX ADMIN — ZINC SULFATE CAP 220 MG (50 MG ELEMENTAL ZN) 220 MG: 220 (50 ZN) CAP at 07:46

## 2017-05-22 RX ADMIN — INSULIN GLARGINE 25 UNITS: 100 INJECTION, SOLUTION SUBCUTANEOUS at 07:52

## 2017-05-22 RX ADMIN — TAMSULOSIN HYDROCHLORIDE 0.4 MG: 0.4 CAPSULE ORAL at 07:44

## 2017-05-22 RX ADMIN — SENNOSIDES AND DOCUSATE SODIUM 2 TABLET: 8.6; 5 TABLET ORAL at 19:43

## 2017-05-22 RX ADMIN — BUMETANIDE 0.5 MG: 0.5 TABLET ORAL at 07:48

## 2017-05-22 RX ADMIN — Medication 25000 UNITS: at 07:45

## 2017-05-22 RX ADMIN — PANTOPRAZOLE SODIUM 40 MG: 20 TABLET, DELAYED RELEASE ORAL at 07:47

## 2017-05-22 NOTE — PROGRESS NOTES
Pt attended Falls Prevention class today with group of 3 patients. Pt selected for class due to documented gait deficit and falls risk. Class includes education in falls risks, how to decrease that risk through behavior and home modifications and energy conservation; and instruction in available equipment designed to increase home safety. Pt was able to verbalize understanding of materials and participated appropriately in the discussion and problem-solving segments of the class.  Pt was instructed he will be quizzed later in order to demonstrate comprehension of material.

## 2017-05-22 NOTE — PLAN OF CARE
Problem: Goal/Outcome  Goal: Goal Outcome Summary  OT: Various IADL completed with focus on dynamic standing and walking and LUE coordination. Pt making good improvement. Care conference tomorrow.

## 2017-05-22 NOTE — PLAN OF CARE
Problem: Goal/Outcome  Goal: Goal Outcome Summary  Outcome: No Change  FOCUS/GOAL  Bowel management, Bladder management, Pain management and Wound care management  No BM reported this shift, pt took bowel meds. Continues to experience urine retention with attempts to double void; straight cath volume of 475. Dressing of chest changed with one piece to funneling. CO of pain in the left leg with relief upon taking PRN tylenol.. Wheezing on inspiration in left lobes; denies SOB. Pt encouraged to use incentive spirometry.  AOx3, able to make needs made. Call light on for safety. Continue with POC.

## 2017-05-22 NOTE — PLAN OF CARE
Problem: Goal/Outcome  Goal: Goal Outcome Summary  Pt seen fo cognitive tx- use of memory strategies remains variable- pt performed better with a visual recall task ( 80% accuracy) vs a verbal recall task- often needing multiple repetitions to aid in recall of verbal info. With more complex reasoning task- pt needing min assist to complete and to aid in the divided attention demands of task

## 2017-05-22 NOTE — PLAN OF CARE
Problem: Goal/Outcome  Goal: Goal Outcome Summary  Outcome: No Change  Alert and oriented to self place and time, speak Palauan with limited English. Patient refused to use the Br or to be scanned at 0330. At 0530 Patient went to the BR but did not void, scan for 200 with the new bladder scan machine, within 5 minutes scanned with the older  and it reveals 635. Patient refused to be strainth cath and would rather double void. Will continue enc compliance with straight cathing, will continue POC Cognition

## 2017-05-22 NOTE — PLAN OF CARE
Problem: Goal/Outcome  Goal: Goal Outcome Summary  FOCUS/GOAL  Bowel management, Bladder management, Pain management, Mobility and Safety management     ASSESSMENT, INTERVENTIONS AND CONTINUING PLAN FOR GOAL:  Pt is alert and oriented. Continent of bladder, voiding in toilet. Positive bowel sounds, medium BM this morning. Denied pain. Blood glucose 122 at 0530, no insulin coverage required. Wife brought food in early. Up with SBA with walker and gait belt. Bed alarm on for safety. Used call light appropriately, able to make needs known. Will continue with POC.    Blood glucose at 12 noon 115, no further interventions needed at this time. Up to bathroom twice with no PVR's noted each void.  Patient making needs known. Right side with weakness noted, but patient able to walk with walker and SBA.  Continue plan of care.

## 2017-05-22 NOTE — PROGRESS NOTES
PM&R Daily Note:    Continues with urine retention needing some intermittent catheterization. Caution with increasing tamsulosin. Trial starting finasteride. Can take 1+ weeks to take full effect. Continue with intermittent catheterization if double voiding not sufficient.  Some lower BP's still at times though not symptomatic. Have hold parameters for meds (added for bumex for SBP < 95 or symptomatic).  Hypokalemia following.  Hospitalist following and commented on this.    Team rounds tomorrow to review progress.    Vitals:    05/22/17 0739 05/22/17 0751 05/22/17 1612 05/22/17 1632   BP: 117/80  101/72 94/69   BP Location: Right arm  Right arm Right arm   Pulse: 107 104 110    Resp: 18  16    Temp: 96.9  F (36.1  C)  96.9  F (36.1  C)    TempSrc: Oral  Oral    SpO2: 99%  100%    Weight:       Height:         Alert, pleasant  No cough or wheeze  Eating some supper, appetite still not great.

## 2017-05-23 LAB
GLUCOSE BLDC GLUCOMTR-MCNC: 114 MG/DL (ref 70–99)
GLUCOSE BLDC GLUCOMTR-MCNC: 123 MG/DL (ref 70–99)
GLUCOSE BLDC GLUCOMTR-MCNC: 158 MG/DL (ref 70–99)
GLUCOSE BLDC GLUCOMTR-MCNC: 213 MG/DL (ref 70–99)
INR PPP: 3.49 (ref 0.86–1.14)
POTASSIUM SERPL-SCNC: 3.2 MMOL/L (ref 3.4–5.3)

## 2017-05-23 PROCEDURE — 40000133 ZZH STATISTIC OT WARD VISIT: Performed by: STUDENT IN AN ORGANIZED HEALTH CARE EDUCATION/TRAINING PROGRAM

## 2017-05-23 PROCEDURE — T1013 SIGN LANG/ORAL INTERPRETER: HCPCS | Mod: U3

## 2017-05-23 PROCEDURE — 40000193 ZZH STATISTIC PT WARD VISIT: Performed by: REHABILITATION PRACTITIONER

## 2017-05-23 PROCEDURE — 97532 ZZHC SP COGNITIVE SKILLS EA 15 MIN: CPT | Mod: GN | Performed by: SPEECH-LANGUAGE PATHOLOGIST

## 2017-05-23 PROCEDURE — 40000187 ZZH STATISTIC PATIENT MED CONFERENCE < 30 MIN: Performed by: STUDENT IN AN ORGANIZED HEALTH CARE EDUCATION/TRAINING PROGRAM

## 2017-05-23 PROCEDURE — 99211 OFF/OP EST MAY X REQ PHY/QHP: CPT

## 2017-05-23 PROCEDURE — 25000132 ZZH RX MED GY IP 250 OP 250 PS 637: Performed by: PHYSICAL MEDICINE & REHABILITATION

## 2017-05-23 PROCEDURE — 25000132 ZZH RX MED GY IP 250 OP 250 PS 637: Performed by: PHYSICIAN ASSISTANT

## 2017-05-23 PROCEDURE — 97530 THERAPEUTIC ACTIVITIES: CPT | Mod: GP | Performed by: REHABILITATION PRACTITIONER

## 2017-05-23 PROCEDURE — 00000146 ZZHCL STATISTIC GLUCOSE BY METER IP

## 2017-05-23 PROCEDURE — 97535 SELF CARE MNGMENT TRAINING: CPT | Mod: GO | Performed by: STUDENT IN AN ORGANIZED HEALTH CARE EDUCATION/TRAINING PROGRAM

## 2017-05-23 PROCEDURE — 99233 SBSQ HOSP IP/OBS HIGH 50: CPT | Performed by: NURSE PRACTITIONER

## 2017-05-23 PROCEDURE — 25000132 ZZH RX MED GY IP 250 OP 250 PS 637: Performed by: NURSE PRACTITIONER

## 2017-05-23 PROCEDURE — 97110 THERAPEUTIC EXERCISES: CPT | Mod: GP | Performed by: REHABILITATION PRACTITIONER

## 2017-05-23 PROCEDURE — 85610 PROTHROMBIN TIME: CPT | Performed by: PHYSICIAN ASSISTANT

## 2017-05-23 PROCEDURE — 40000187 ZZH STATISTIC PATIENT MED CONFERENCE < 30 MIN: Performed by: SPEECH-LANGUAGE PATHOLOGIST

## 2017-05-23 PROCEDURE — 40000225 ZZH STATISTIC SLP WARD VISIT: Performed by: SPEECH-LANGUAGE PATHOLOGIST

## 2017-05-23 PROCEDURE — 12800006 ZZH R&B REHAB

## 2017-05-23 PROCEDURE — S0138 FINASTERIDE, 5 MG: HCPCS | Performed by: PHYSICAL MEDICINE & REHABILITATION

## 2017-05-23 PROCEDURE — 97116 GAIT TRAINING THERAPY: CPT | Mod: GP | Performed by: REHABILITATION PRACTITIONER

## 2017-05-23 PROCEDURE — 97530 THERAPEUTIC ACTIVITIES: CPT | Mod: GO | Performed by: STUDENT IN AN ORGANIZED HEALTH CARE EDUCATION/TRAINING PROGRAM

## 2017-05-23 PROCEDURE — 84132 ASSAY OF SERUM POTASSIUM: CPT | Performed by: PHYSICIAN ASSISTANT

## 2017-05-23 PROCEDURE — 99232 SBSQ HOSP IP/OBS MODERATE 35: CPT | Performed by: PHYSICIAN ASSISTANT

## 2017-05-23 PROCEDURE — 36415 COLL VENOUS BLD VENIPUNCTURE: CPT | Performed by: PHYSICIAN ASSISTANT

## 2017-05-23 RX ORDER — LISINOPRIL 2.5 MG/1
2.5 TABLET ORAL 2 TIMES DAILY
Status: DISCONTINUED | OUTPATIENT
Start: 2017-05-23 | End: 2017-05-26 | Stop reason: HOSPADM

## 2017-05-23 RX ORDER — POTASSIUM CHLORIDE 750 MG/1
20 TABLET, EXTENDED RELEASE ORAL DAILY
Status: DISCONTINUED | OUTPATIENT
Start: 2017-05-23 | End: 2017-05-23

## 2017-05-23 RX ORDER — POTASSIUM CHLORIDE 750 MG/1
20 TABLET, EXTENDED RELEASE ORAL 2 TIMES DAILY
Status: DISCONTINUED | OUTPATIENT
Start: 2017-05-23 | End: 2017-05-26 | Stop reason: HOSPADM

## 2017-05-23 RX ADMIN — MELATONIN TAB 3 MG 3 MG: 3 TAB at 23:53

## 2017-05-23 RX ADMIN — SENNOSIDES AND DOCUSATE SODIUM 2 TABLET: 8.6; 5 TABLET ORAL at 19:39

## 2017-05-23 RX ADMIN — ZINC SULFATE CAP 220 MG (50 MG ELEMENTAL ZN) 220 MG: 220 (50 ZN) CAP at 07:46

## 2017-05-23 RX ADMIN — DIGOXIN 125 MCG: 125 TABLET ORAL at 07:46

## 2017-05-23 RX ADMIN — POTASSIUM CHLORIDE 20 MEQ: 750 TABLET, FILM COATED, EXTENDED RELEASE ORAL at 09:59

## 2017-05-23 RX ADMIN — Medication 25000 UNITS: at 07:46

## 2017-05-23 RX ADMIN — OXYCODONE HYDROCHLORIDE AND ACETAMINOPHEN 500 MG: 500 TABLET ORAL at 07:47

## 2017-05-23 RX ADMIN — POTASSIUM CHLORIDE 20 MEQ: 750 TABLET, FILM COATED, EXTENDED RELEASE ORAL at 19:34

## 2017-05-23 RX ADMIN — ACETAMINOPHEN 650 MG: 325 TABLET ORAL at 23:53

## 2017-05-23 RX ADMIN — CETIRIZINE HYDROCHLORIDE 10 MG: 5 TABLET ORAL at 07:47

## 2017-05-23 RX ADMIN — Medication 12.5 MG: at 07:46

## 2017-05-23 RX ADMIN — BUMETANIDE 0.5 MG: 0.5 TABLET ORAL at 07:47

## 2017-05-23 RX ADMIN — FINASTERIDE 5 MG: 5 TABLET, FILM COATED ORAL at 07:48

## 2017-05-23 RX ADMIN — Medication 0.5 MG: at 19:25

## 2017-05-23 RX ADMIN — TAMSULOSIN HYDROCHLORIDE 0.4 MG: 0.4 CAPSULE ORAL at 07:46

## 2017-05-23 RX ADMIN — ATORVASTATIN CALCIUM 40 MG: 40 TABLET, FILM COATED ORAL at 19:39

## 2017-05-23 RX ADMIN — BUMETANIDE 0.5 MG: 0.5 TABLET ORAL at 16:04

## 2017-05-23 RX ADMIN — INSULIN GLARGINE 25 UNITS: 100 INJECTION, SOLUTION SUBCUTANEOUS at 07:48

## 2017-05-23 RX ADMIN — PANTOPRAZOLE SODIUM 40 MG: 40 TABLET, DELAYED RELEASE ORAL at 06:50

## 2017-05-23 RX ADMIN — CLOPIDOGREL BISULFATE 75 MG: 75 TABLET, FILM COATED ORAL at 07:47

## 2017-05-23 NOTE — CONSULTS
[facility]   Cardiology II Service / Advanced Heart Failure  Daily Progress Note  Date of Service: 5/23/2017      Patient: Luke Henao  MRN: [id number]  Admission Date: 5/12/2017  Hospital Day # 11    Assessment and Plan: Luke Henao is a 51 year old male with a history of smoking and diabetes who was hospitalized in cardiogenic shock after sustaining an inferior wall STEMI on 3/26. Had a prolonged hospital course due to a number of complications, including the placement of 7 stents (LAD, LCx, and RCA), difficulty weaning from the IABP, and placement of a MitraClip on 5/1 for ischemic mitral regurgitation. Patient was discharged to ARU on 5/11 for further rehab.    1. Ischemic cardiomyopathy  Patient is recovering well, and has been stable from a cardiac standpoint during his time at rehab. Lisinopril was decreased and briefly discontinued secondary to hypotension, and was able to be restarted on 5/21. Diuretics were decreased from discharge, and the patient is euvolemic on exam with stable renal function and a stable weight trend. Blood pressures have been in the 100s-110s/70s-80s, and the patient denies any dizziness. Recommend increasing ACE inhibitor as tolerated (was on 10mg BID at time of hospital discharge).  - Increase Lisinopril to 2.5mg BID.  - BB deferred in the setting of recent cardiogenic shock.  - Continue Spironolactone 12.5mg daily.  - Continue Bumex 0.5mg BID.  - Increase potassium supplementation to 20meq BID.  - Continue Digoxin 125mcg daily.    2. CAD  - Continue Plavix 75mg daily.  - Continue Lipitor 40mg qhs.  - BB deferred in the setting of recent cardiogenic shock.    3. DVT  - Continue warfarin, INR goal 2-3.    ================================================================    Interval History/ROS: Luke says that he is feeling quite well, and is excited to be going home on Saturday. He has gotten much stronger and is tolerating activity much better. Denies any dizziness, SOB,  chest pain, edema, or palpitations. Appetite is good and he brennen any nausea or vomiting.     Last 24 hr care team notes reviewed.   ROS:  4 point ROS including Respiratory, CV, GI and , other than that noted in the HPI, is negative.     Medications: Reviewed in EPIC.     Physical Exam:   /72 (BP Location: Right arm)  Pulse 104  Temp 97.3  F (36.3  C) (Oral)  Resp 16  Ht 1.524 m (5')  Wt 55.4 kg (122 lb 1.6 oz)  SpO2 96%  BMI 23.85 kg/m2  GENERAL: Appears alert and oriented times three.   HEENT: Eye symmetrical and free of discharge bilaterally. Mucous membranes moist and without lesions.  NECK: Supple and without lymphadenopathy. JVD not visible.   CV: RRR, S1S2 present without murmur, rub, or gallop.   RESPIRATORY: Respirations regular, even, and unlabored. Lungs CTA throughout.   GI: Soft and non distended with normoactive bowel sounds present in all quadrants. No tenderness, rebound, guarding. No organomegaly.   EXTREMITIES: No peripheral edema. 2+ bilateral pedal pulses.   NEUROLOGIC: Alert and orientated x 3. CN II-XII grossly intact. No focal deficits.   MUSCULOSKELETAL: No joint swelling or tenderness.   SKIN: No jaundice. No rashes or lesions.     Data:  CMP  Recent Labs  Lab 05/23/17  0634 05/22/17 0827   NA  --  142   POTASSIUM 3.2* 3.3*   CHLORIDE  --  108   CO2  --  25   ANIONGAP  --  9   GLC  --  232*   BUN  --  16   CR  --  0.70   GFRESTIMATED  --  >90Non  GFR Calc   GFRESTBLACK  --  >90African American GFR Calc   ROB  --  8.3*   MAG  --  1.6   PHOS  --  2.5     CBC  Recent Labs  Lab 05/22/17  0827   WBC 7.5   RBC 3.69*   HGB 11.0*   HCT 34.8*   MCV 94   MCH 29.8   MCHC 31.6   RDW 15.9*        INR  Recent Labs  Lab 05/23/17  0634 05/22/17  0827 05/21/17  0511 05/20/17  0551   INR 3.49* 2.73* 2.47* 2.66*       Dora Garay, RUBI CNP  [phone]

## 2017-05-23 NOTE — PROGRESS NOTES
[facility]  WO Nurse Inpatient Adult Pressure INJURY (PI) Wound Assessment     Follow up assessment of PU(s) on pt's:   Coccyx    Data:   Patient History:      Per MD note(s): 51 year old man presented with cardiogenic shock from inferior STEMI s/p RCA aspiration thrombectomy and POBA on 3/26 at [facility] s/p IABP and initiation of Dopamine, also during procedure, CHB s/p temp pacer, emesis/hematmesis and altered mental status s/p intubation transferred here for consideration of mechanical support on 3/27. Had PCI to RCA, LAD and LCx (on ASA and plavix) started on epinephrine in addition to dopamine, post procedure developed distributive shock picture from infection (aspiration pneumonia? Sputum cx growing staph aureus, on vanco and zosyn) vs post-MI SIRS response. Currently in cardiogenic shock with IABP in situ.    Patient now on Acute Rehab for ongoing therapies.      Current mattress: Atmos Air  Current pressure relieving devices:  Pillows, Geomat cushion, patient now ambulatory and able to turn and reposition independently, prefers side lying.    Moisture Management:  Incontinence Protocol    Current Diet / Nutrition:       Active Diet Order      3 Gram Sodium Diet    Tube Feeding:   John Score  Av.3  Min: 10  Max: 20   Recent Labs   Lab Test  17   0634  17   0827   17   0404   17   0359   ALBUMIN   --    --    --   2.4*   < >  2.2*   HGB   --   11.0*   < >  7.6*   < >  7.6*   INR  3.49*  2.73*   < >  2.22*   < >   --    WBC   --   7.5   < >  6.8   < >  8.5   A1C   --    --    --    --    --   Below Assay Range   Canceled, Test credited  EMILIE LAW RN @ [id number] KLA     CRP   --    --    --    --    --   17.0*    < > = values in this interval not displayed.                                                                                                Pressure Injury Assessment  (location #1):   Coccyx    Wound History:   Coccyx pressure injury stable on assessment  "today. Now a full thickness Stage 3 pressure injury.     Wound Base: Pressure Injury is full thickness, stable in size and depth. Periwound skin improving.  Specific Dimensions (length x width x depth, in cm) :   1.5 x 0.6 x 0.2 cm    Palpation of the wound bed:  Bone is not palpable    Slough appearance:  none    Periwound Skin: intact      Color: normal and consistent with surrounding tissue    Temperature  normal     Drainage:    Amount: scant,  Color: serous       Odor: none    Pain: Patient does state the area is \"sore\" and is independent turing side to side.         Intervention:     Patient's chart evaluated.      John Interventions:  Current John Interventions and Care Plan reviewed and updated, appropriate at this time.    Wound was assessed.    Wound Care: was done     Visual inspection    Cleansing with NS or Microklenz    Application of Mepilex dressing with Coloplast Triad Barrier at open wound    Orders  Reviewed    Supplies  N/A    Discussed plan of care with Nurse    Encourage use of Geomat cushion and frequent repositioning.           Assessment:     Pressure Injury (PI) located on Coccyx: 3    Status: continues to evolve, single wound now present             Plan:     Nursing to notify the Provider(s) and re-consult the WO Nurse if wound(s) deteriorate(s).  Plan of care for wound located on Coccyx: Clean wound with Microklenz, Apply a small amount of Coloplast Triad Barrier directly to wound, Cover with Mepilex dressing changer daily  WO Nurse will return: weekly  Face to face time: 10 minutes    "

## 2017-05-23 NOTE — PLAN OF CARE
L cystic adnexal mass seen on CTabdo in setting of reported complete hysterectomy  -Pelvic U/S today     Problem: Goal/Outcome  Goal: Goal Outcome Summary  Outcome: No Change  FOCUS/GOAL  Bladder management, Wound care management and Medical management     ASSESSMENT, INTERVENTIONS AND CONTINUING PLAN FOR GOAL:  Pt a&ox4, denied pain. Refused shower tonight-stating he was too cold. Ao1 using walker. Pt was able to void on toilet x2, with encouragement. Pt PVRs 32 amd 556. Encouraged to revoid at hs, stated he will in 20 minutes. Dressings changed as ordered. Alarms on. Continue with poc.

## 2017-05-23 NOTE — PLAN OF CARE
Problem: Goal/Outcome  Goal: Goal Outcome Summary  Ot: Progressed pt to MOD I in the room with walker.

## 2017-05-23 NOTE — PROGRESS NOTES
[facility], [place]   Physical Medicine and Rehabilitation Daily Note    51-year-old with functional impairments in mobility, activities of daily living and mild cognition following month and a half hospital stay starting with significant myocardial infarction with cardiogenic shock, but also ischemic stroke watershed distribution affecting the left side greater than right. Admitted to acute rehab 5/12/17.           Assessment and Plan of Care:   Formal care conference held today. please see separate document in Plan of Care tab for full details. Briefly showing improvements. Still with some intermittent cateterization needed.   Hoping with present progress to advance to more independence with ambulation / ADL's in next few days and if all ok then home by weekend. Probable outpatient PT, OT, SLP after discharge, location TBD.    Eating picked up and maintaining weights.  Diabetic educator to explore his / his wife's capacities in management options.    Urine retention: persists since trail dawkins out on Wed AM. Several times needing catheterization. Will add tamsulosin but monitor close for exacerbating orthostatic hypotension.  Holding lisinopril during this.    Continue therapies and plan of care    Medical issues:  Appreciate hospitalist consult, reviewed plan.  Cards: lisinopril 2.5 BID, bumex 0.5 BID and spironolactone 12.5 daily, digoxin 125 mcg daily. No BB for now with recent heart failure decompensation.  plavix for 1 year with stent.  Continues with warfarin target INR 2-3 for DVT, tx for 3-6 months. Drifted bit high, adjustemnts pe pharmacy.   Hyperlipidemia on statin  Stage III coccygeal pressure ulcer continues to slowly improve, dressing with mepilex and offloading, right upper chest open wound still from recent vascular access and balloon pump, nugauze packing in tract.   Tobacco dependence, doing ok with urges.          Review of Systems:   Still with some variable  capacity to empty bladder. Some times he feels stream starts ok and others with some hesitancy.   No shortness of breath or chest pain.          Physical Exam:     Vitals:    05/22/17 1632 05/22/17 1813 05/23/17 0609 05/23/17 0745   BP: 94/69 106/74  102/72   BP Location: Right arm Right arm  Right arm   Pulse:    104   Resp:    16   Temp:    97.3  F (36.3  C)   TempSrc:    Oral   SpO2:    96%   Weight:   55.4 kg (122 lb 1.6 oz)    Height:         Alert, conversant. No diaphoresis  FT removed by me today, no difficulties  Heart RRR  Lungs clear         Data:   Scheduled meds    warfarin  0.5 mg Oral ONCE at 18:00     lisinopril  2.5 mg Oral BID     potassium chloride  20 mEq Oral BID     finasteride  5 mg Oral Daily     pantoprazole  40 mg Oral QAM AC     cetirizine  10 mg Oral Daily     tamsulosin  0.4 mg Oral Daily     beta carotene  25,000 Units Oral Daily     ascorbic acid  500 mg Oral Daily     zinc sulfate  220 mg Oral Daily     bumetanide  0.5 mg Oral BID     senna-docusate  2 tablet Oral BID     atorvastatin  40 mg Oral or Feeding Tube QPM     clopidogrel  75 mg Oral Daily     digoxin  125 mcg Oral Daily     insulin glargine  25 Units Subcutaneous QAM AC     spironolactone  12.5 mg Oral Daily       PRN meds:  alum & mag hydroxide-simethicone, diphenhydrAMINE, bisacodyl, polyethylene glycol, acetaminophen, melatonin, glucose **OR** dextrose **OR** glucagon, Warfarin Therapy Reminder, - MEDICATION INSTRUCTIONS -, IV fluid REPLACEMENT ONLY       Coordination of care:  20 minutes  Face-to-face:              25 minutes  Total time:                  45 minutes

## 2017-05-23 NOTE — PLAN OF CARE
Problem: Goal/Outcome  Goal: Goal Outcome Summary  Outcome: Improving  FOCUS/GOAL  Bladder management, Nutrition/Feeding/Swallowing precautions and Medical management     ASSESSMENT, INTERVENTIONS AND CONTINUING PLAN FOR GOAL:  Alert, able to make needs known, uses call light appropriately. Needs assist of one with walker and gait belt for toileting. Continent of bladder x1 (voided 500 cc of clear, yellow urine)at the beginning of the shift with PVR  248 cc. Attempted to void at 0545 with no results, bladder scan showed 502 cc, straight cathed for 500 cc of clear, yellow urine, tolerated well procedure. Incontinent of small loose stool x1 that required pants to be changed and assist with incontinence hygiene. Blood glucose 158 @ 0538 right after patient ate few bites of soup brought by wife. Denies pain or discomfort.

## 2017-05-23 NOTE — PLAN OF CARE
Problem: Goal/Outcome  Goal: Goal Outcome Summary  Outcome: No Change  Pt alert and oriented x 4. Able to communicate needs with limited English. Using interpretor for PT/OT. Denies pain or any cardiac symptoms. Potassium=3.2 was replaced with potassium po 20 mEq. See new order for potassium po twice daily. Full skin assessment completed. No new skin problem noted. See flow sheet for details. Buttock dressing done by ET nurse. Per ET pressure sore at buttock is improving. Right chest dressing dry and intact. Needs assist of one for mobility and transfer. Right side weakness continues. Unable to void. High pvr noted. See flow sheet. Pt was st cath at 1130 for 800 cc. Pt not very cooperative with double voiding. Started on Proscar at 5/22 and Flomax at 5/18 for retention. Possible discharge on Saturday. Pt will have care conference and PLC on Thursday 5/25.   Areas to focus before discharge:  1- Encourage pt double void and monitor pvr  2- Make sure pt and wife goes to PLC on Thursday 5/25 for insulin management learning.

## 2017-05-23 NOTE — PROGRESS NOTES
[facility]  Internal Medicine Progress Note  Luke Henao  [id number]  May 23, 2017         Assessment & Plan:   Luke Henao is a 51 year old man with a history of DM II and tobacco use who was admitted to [facility] 3/26/17 w/ cardiogenic shock 2/2 inferior wall STEMI and underwent RCA aspiration thrombectomy and balloon angioplasty, IABP and initiation of dopamine, temp pacer, and transferred to [facility] 3/27-5/11/17 for consideration of mechanical support. Had PCI w/ 7 FEMI to RCA, LAD, LCx and required epinephrine in addition to dopamine; post procedure had distributive shock picture from infection (possible aspiration PNA) vs. post-MI SIRS response. Later again developed cardiogenic shock requiring replacement of IABP and MitraClip for ischemic MR. Now transferred to ARU for ongoing rehab therapies.       CAD, ICM. S/p inferior wall STEMI and had 7 FEMI to RCA, LAD, LCx on 3/27/17. Refractory cardiogenic shock. S/p MitraClip on 5/1/17 for ischemic MR. IABP d/c'd on 5/5. Most recent echo 5/2 w/ EF 30-35%, basal inferolateral hypokinesis, less than mild residual MR, mild pulm HTN. ARU course c/b soft BPs so lisinopril (2.5 mg BID) d/c'd on 5/18, resumed at 2.5mg qd 5/21. SBPs most recently 100s-110s and asymptomatic - improved. Weight stable at 122 lbs today, wt has been fluctuating from 118lb-123lb at ARU. No cardiac complaints at this time; no O2 needs or edema. Appears euvolemic on exam.   - Continue lisinopril 2.5 mg daily.   - Continue Plavix x 1 year for FEMI then can change to lifelong ASA (MitraClip).   - Continue digoxin.   - Continue spironolactone 12.5 mg daily and Bumex 0.5 mg BID (was 1 mg BID on admit) w/ daily weights, I&Os, and 3 g Na/1800 cc fluid restrictions.   - Continue statin.   - No beta blocker in setting of recent HF decompensation; will be added back as OP.   - Cardiology not following here but would d/w HF rounding team if issues.       Urinary Retention. No known hx BPH.  Prolonged hospitalization w/ Chavis catheter. Was treated for E coli UTI; UA clear since. Intermittent urinary retention since d/c of Chavis prior to hospital d/c. Chavis replaced 5/12 and removed again 5/17. Flomax started 5/18. Still w/ intermittent retention - seems improved w/ Flomax and double void.   - Continue Flomax w/ close monitoring of BPs.   - Continue double void.   - PRN straight cath for PVR > 300 cc and no success w/ double void.   - May need OP urology f/u after d/c if persists.       DM II. HgbA1c 7.5%. Unknown home PTA regimen - had been given insulin but did not think he needed it so threw it away; verbalized a much better understanding of this today. BG controlled here on Lantus 25 units daily; not really using any sliding scale coverage - thus this was d/c'd on 5/22. BGs stable 115-172 over past 24h.   - Continue Lantus 25 units daily.   - Needs diabetic education and PCP prior to d/c. May be able to start oral regimen as outpatient.       Provoked DVT. Developed non occlusive clot in left IJ and left common femoral vein during acute hospitalization. Continue warfarin w/ INR goal 2-3 for anticipated 3-6 mo course. INR 3.49 today.       Non Severe Protein Calorie Malnutrition. Feeding tube d/c'd 5/18. Nutrition following.       Sinus Tachycardia. Persistent throughout acute hospitalization and ARU course. Attributed to deconditioning and recovering cardiac function. Not on beta blocker d/t recent HF exacerbation. Will be followed OP.       CVA. Developed left hand weakness and pain on 4/29 prompting stroke code. Head/neck CTA showed no hemorrhage but new areas of low attenuation in b/l hemispheres. Neuro consulted. On Plavix and statin for prevention.      Insomnia. Patient reports inability to sleep for a few nights now. Has not tried any medications. Trialed melatonin 3mg qhs with improvement. Patient does not want to try any additional medications, he feels once he gets home, he will be able to  rest well.     Hypokalemia. K slightly low at 3.3 5/22 and 3.2 today. Patient is on lisinopril and aldactone which increase K and bumex which decreases K. Given lisinopril was just restarted 5/21, I trended K closely in hopes it would level out without additional K supplementation, but given it continues to be low, will start KCl 20meq qd today. Trend K tomorrow.       Resolved Problems   Lower GIB. Maroon BMs starting 4/11 w/ drop in hgb by ~1.5 g. Colonoscopy 4/17 showed rectal ulcer thought 2/2 tube but no lesions to intervene upon. Hgb stable w/o recurrence since. On Protonix for prophylaxis.   Hypoxic Respiratory Failure. Intubated 3/26-4/3 in setting of cardiogenic shock, and then re-intubated 4/25-4/28 d/t worsening hypoxia thought 2/2 aspiration PNA vs HCAP. Completed Zosyn 4/19-4/26, vanco 4/20-4/29, meropenem 4/26-5/2. Now on RA.       Medicine will follow.      Interval History:   Earlene is doing okay today. He continues to have some difficulty with sleeping, the melatonin did not seem to help. He does not want to try anything else. He would prefer that he just goes home soon, he feels he will sleep well once home. He otherwise denies any chest pain, sob, dyspnea, fevers, chills, abdominal pain, nausea, vomiting or other concerns.             Physical Exam:   Vitals were reviewed  Blood pressure 102/72, pulse 104, temperature 97.3  F (36.3  C), temperature source Oral, resp. rate 16, height 1.524 m (5'), weight 55.4 kg (122 lb 1.6 oz), SpO2 96 %.  General:alert, NAD, resting in bed, pleasant and interactive  HEENT: NCAT, anicteric sclera, EOMI, mucous membranes pink and moist, no JVD  Cardiovascular: RRR, S1S2, no m/r/g  Lungs:CTAB  Abdomen: normoactive BS, soft with no distention and no tenderness   Vascular: no peripheral edema, distal pulses palpable  Neurologic: AAO X 3, CN 2-12 grossly intact, no focal deficits  Skin: no jaundice, rashes, or lesions on exposed areas of skin      Fiona Chana  Sangeetha  Internal Medicine [facility] Hospitalist  [phone]

## 2017-05-23 NOTE — CARE CONFERENCE
Acute Rehab Care Conference/Team Rounds      Type: Patient Conference though family not present / notified so will reschedule formal in next few days. Did have a shortened meeting with Luke.    Present: Dr Flo Chacon, Poornima Maria [facility], Luke Henao patient, Carla Radha OT, Yue Bird SLP, Iris Steinberg RN, Vivi Whelan PT      Discharge Barriers/Treatment/Education    Rehab Diagnosis: MI, cardiogenic shock, stroke    Active Medical Co-morbidities/Prognosis: urine retention    Safety: Uses call light appropriately. SBA with walker and gait belt for toileting.     Pain: Denies pain or discomfort.    Medications, Skin, Tubes/Lines: Takes whole pills. Daily drsg changes to coccyx and right chest. No tubes or lines.    Swallowing/Nutrition: Tolerates a regular diet and thin liquids without difficulty    Bowel/Bladder: Occasional fecal incontinence noted. Continent of bladder with elevated bladder scans that require straight cath, but pt refuses at times.    Psychosocial: Pt resides with his wife. Pts goal to return home with continued family support. Team working towards a safe d/c plan.    ADLs/IADLs: Pt is SBA/CGA for self cares, pt had recent episode of bowel incontinent requiring total assist for clean up. Pt is using walker for functional mobility and is making progress with coordination overall. Would like to progress pt to MOD I prior to DC. Pt's wife will be home to assist pt at discharge but may need to confirm this with her since she does work. Recommend GB in tub area and shower chair for energy conservation. Recommend holding off on driving until formally evaluated and outside home maintenance initially. Recommend OP OT.     Mobility: pt is progressing well with all functional mobility skills. Pt is MOD I for all bed mob, and sit to stand. MOD I for amb in room with WW. Pt needing set up for all. Pt amb up to 200;x 1 with WW and SBA. 200'x 1 no AD needing CGA. Pt uses safe tech for donning shoes, may  needing dressing equipment. Pt demo floor transfer with V.c for tech needing CGA. Pt should be ready for D/c end of week with OP PT to follow.     Cognition/Language:Pt has continued to make progress for goals focusing on cognition: recent memory, reasoning and divided attention as well as auditory processing-- pt is using strategies of asking for repetition, uses rehearsal and visualization to aid in recent recall of auditory info. Pt however overall has moderate deficits in memory and mild deficits in problem solving and reasoning. Pt has made progress with strategies to aid in organization when completing more complex written reasoning tasks and to aid in divided attention demands of tasks. Pt will need oversight and supervision with more complex tasks of financial management and medication management and recommend continued OP sptx following d/c    Community Re-Entry:    Transportation:    Decision maker: self    Plan of Care and goals reviewed and updated.    Discharge Plan/Recommendations    Fall Precautions: continue    Patient/Family input to goals: outlines consideration for staying with family in [place] initially at d/c (later said he would go to his home).    Overall plan for the patient: showing improvements. Still with some intermittent cateterization needed.   Hoping with present progress to advance to more independence with ambulation / ADL's in next few days and if all ok then home by weekend. Probable outpatient PT, OT, SLP after discharge, location TBD.      Utilization Review and Continued Stay Justification    Medical Necessity Criteria:    For any criteria that is not met, please document reason and plan for discharge, transfer, or modification of plan of care to address.    Requires intensive rehabilitation program to treat functional deficits?: Yes    Requires 3x per week or greater involvement of rehabilitation physician to oversee rehabilitation program?: Yes    Requires rehabilitation  nursing interventions?: Yes    Patient is making functional progress?: Yes    There is a potential for additional functional progress? Yes    Patient is participating in therapy 3 hours per day a minimum of 5 days per week or 15 hours per week in 7 day period?:Yes    Has discharge needs that require coordinated discharge planning approach?:Yes        Final Physician Sign off    Statement of Approval: I agree with all the recommendations detailed in this document.      Patient Goals               OT goal: hygiene/grooming: modified independent  OT goal: upper body dressing: Modified independent  OT goal: lower body dressing: Modified independent  OT goal: upper body bathing: Modified independent  OT goal: lower body bathing: Modified independent  OT goal: bed mobility: Modified independent  OT Goal: transfer: Modified independent  OT goal: toilet transfer/toileting: Modified independent  OT goal: meal preparation: with simple meal preparation, ambulatory level  OT goal: home management: with light demand household tasks, ambulatory level  OT goal: cognitive: Patient/caregiver will verbalize understanding of cognitive assessment results/recommendations as needed for safe discharge planning  OT/LILIAN face-to-face collaboration occurred, patient progress and plan of care reviewed.       PT target date for goal attainment: 05/28/17  PT Frequency: 2/day for 60-75 min/day  PT goal: bed mobility: Independent  PT goal: transfers: Modified independent, Assistive device  PT goal: gait: Modified independent, 150 feet, Rolling walker  PT goal: stairs: 2 stairs, Rail on both sides, Supervision/stand-by assist  PT goal: perform aerobic activity with stable cardiovascular response: 15 minutes, continuous activity  PT goal 1: Pt will be able to demonstrate safe car transfer at SBA level for community mobility  PT Goal 2: Pt will demonstrate TUG score <14 sec in order to demonstrate decreased falls risk.  PT Goal 3: Pt will be Ind with  HEP for continued improvement at d/c  PT Goal 4: Pt will demonstrate safe floor transfer in case of fall in home - goal met       SLP target date for goal attainment: 05/28/17  SLP Frequency: daily  SLP goal 1: Pt will complete mod to complex auditory comprehension tasks with 90% accuracy  SLP goal 2: Pt will utilize compensatory attention and memory strategies to complete mod level recall/new learning and divided attention tasks with 90% accuracy  SLP goal 3: Pt will complete mod level reasoning/ problem solving tasks with 90% accuracy     Goal: Wound Management: Patient and family will demonstrate understanding on signs and symptoms of infection before discharge.  Goal: Medical Management: Patient and family will demonstrate understanding on treatments before discharge.  Goal: Nutrition/Feeding/Swallowing Precautions: Patient will demonstrate understanding on adequate intake before discharge.     Patient/Family Goal: Bladder: Patient will remain free of infection before discharge.

## 2017-05-24 LAB
FUNGUS SPEC CULT: ABNORMAL
GLUCOSE BLDC GLUCOMTR-MCNC: 106 MG/DL (ref 70–99)
GLUCOSE BLDC GLUCOMTR-MCNC: 108 MG/DL (ref 70–99)
GLUCOSE BLDC GLUCOMTR-MCNC: 145 MG/DL (ref 70–99)
GLUCOSE BLDC GLUCOMTR-MCNC: 145 MG/DL (ref 70–99)
GLUCOSE BLDC GLUCOMTR-MCNC: 183 MG/DL (ref 70–99)
INR PPP: 2.7 (ref 0.86–1.14)
MICRO REPORT STATUS: ABNORMAL
POTASSIUM SERPL-SCNC: 3.7 MMOL/L (ref 3.4–5.3)
SPECIMEN SOURCE: ABNORMAL

## 2017-05-24 PROCEDURE — 25000132 ZZH RX MED GY IP 250 OP 250 PS 637: Performed by: PHYSICAL MEDICINE & REHABILITATION

## 2017-05-24 PROCEDURE — 97110 THERAPEUTIC EXERCISES: CPT | Mod: GO

## 2017-05-24 PROCEDURE — S0138 FINASTERIDE, 5 MG: HCPCS | Performed by: PHYSICAL MEDICINE & REHABILITATION

## 2017-05-24 PROCEDURE — 99232 SBSQ HOSP IP/OBS MODERATE 35: CPT | Performed by: PHYSICIAN ASSISTANT

## 2017-05-24 PROCEDURE — 40000133 ZZH STATISTIC OT WARD VISIT

## 2017-05-24 PROCEDURE — 97112 NEUROMUSCULAR REEDUCATION: CPT | Mod: GP

## 2017-05-24 PROCEDURE — 84132 ASSAY OF SERUM POTASSIUM: CPT | Performed by: PHYSICIAN ASSISTANT

## 2017-05-24 PROCEDURE — 40000225 ZZH STATISTIC SLP WARD VISIT: Performed by: SPEECH-LANGUAGE PATHOLOGIST

## 2017-05-24 PROCEDURE — 25000132 ZZH RX MED GY IP 250 OP 250 PS 637: Performed by: NURSE PRACTITIONER

## 2017-05-24 PROCEDURE — 85610 PROTHROMBIN TIME: CPT | Performed by: PHYSICIAN ASSISTANT

## 2017-05-24 PROCEDURE — 36415 COLL VENOUS BLD VENIPUNCTURE: CPT | Performed by: PHYSICIAN ASSISTANT

## 2017-05-24 PROCEDURE — 97532 ZZHC SP COGNITIVE SKILLS EA 15 MIN: CPT | Mod: GN | Performed by: SPEECH-LANGUAGE PATHOLOGIST

## 2017-05-24 PROCEDURE — 97535 SELF CARE MNGMENT TRAINING: CPT | Mod: GO

## 2017-05-24 PROCEDURE — 25000132 ZZH RX MED GY IP 250 OP 250 PS 637

## 2017-05-24 PROCEDURE — 25000132 ZZH RX MED GY IP 250 OP 250 PS 637: Performed by: PHYSICIAN ASSISTANT

## 2017-05-24 PROCEDURE — 97530 THERAPEUTIC ACTIVITIES: CPT | Mod: GO | Performed by: OCCUPATIONAL THERAPIST

## 2017-05-24 PROCEDURE — 40000193 ZZH STATISTIC PT WARD VISIT

## 2017-05-24 PROCEDURE — 12800006 ZZH R&B REHAB

## 2017-05-24 PROCEDURE — 25000131 ZZH RX MED GY IP 250 OP 636 PS 637: Performed by: PHYSICIAN ASSISTANT

## 2017-05-24 PROCEDURE — 00000146 ZZHCL STATISTIC GLUCOSE BY METER IP

## 2017-05-24 PROCEDURE — 97530 THERAPEUTIC ACTIVITIES: CPT | Mod: GP

## 2017-05-24 PROCEDURE — 40000133 ZZH STATISTIC OT WARD VISIT: Performed by: OCCUPATIONAL THERAPIST

## 2017-05-24 PROCEDURE — T1013 SIGN LANG/ORAL INTERPRETER: HCPCS | Mod: U3

## 2017-05-24 RX ORDER — WARFARIN SODIUM 3 MG/1
3 TABLET ORAL
Status: COMPLETED | OUTPATIENT
Start: 2017-05-24 | End: 2017-05-24

## 2017-05-24 RX ADMIN — SENNOSIDES AND DOCUSATE SODIUM 2 TABLET: 8.6; 5 TABLET ORAL at 08:05

## 2017-05-24 RX ADMIN — MELATONIN TAB 3 MG 3 MG: 3 TAB at 20:59

## 2017-05-24 RX ADMIN — PANTOPRAZOLE SODIUM 40 MG: 40 TABLET, DELAYED RELEASE ORAL at 09:01

## 2017-05-24 RX ADMIN — CETIRIZINE HYDROCHLORIDE 10 MG: 5 TABLET ORAL at 08:04

## 2017-05-24 RX ADMIN — LISINOPRIL 2.5 MG: 2.5 TABLET ORAL at 08:07

## 2017-05-24 RX ADMIN — POTASSIUM CHLORIDE 20 MEQ: 750 TABLET, FILM COATED, EXTENDED RELEASE ORAL at 20:29

## 2017-05-24 RX ADMIN — OXYCODONE HYDROCHLORIDE AND ACETAMINOPHEN 500 MG: 500 TABLET ORAL at 08:07

## 2017-05-24 RX ADMIN — Medication 25000 UNITS: at 08:05

## 2017-05-24 RX ADMIN — CLOPIDOGREL BISULFATE 75 MG: 75 TABLET, FILM COATED ORAL at 08:06

## 2017-05-24 RX ADMIN — DIGOXIN 125 MCG: 125 TABLET ORAL at 08:06

## 2017-05-24 RX ADMIN — FINASTERIDE 5 MG: 5 TABLET, FILM COATED ORAL at 08:05

## 2017-05-24 RX ADMIN — WARFARIN SODIUM 3 MG: 3 TABLET ORAL at 17:06

## 2017-05-24 RX ADMIN — BUMETANIDE 0.5 MG: 0.5 TABLET ORAL at 16:23

## 2017-05-24 RX ADMIN — INSULIN GLARGINE 25 UNITS: 100 INJECTION, SOLUTION SUBCUTANEOUS at 09:32

## 2017-05-24 RX ADMIN — TAMSULOSIN HYDROCHLORIDE 0.4 MG: 0.4 CAPSULE ORAL at 08:07

## 2017-05-24 RX ADMIN — ZINC SULFATE CAP 220 MG (50 MG ELEMENTAL ZN) 220 MG: 220 (50 ZN) CAP at 08:06

## 2017-05-24 RX ADMIN — SENNOSIDES AND DOCUSATE SODIUM 2 TABLET: 8.6; 5 TABLET ORAL at 20:29

## 2017-05-24 RX ADMIN — POTASSIUM CHLORIDE 20 MEQ: 750 TABLET, FILM COATED, EXTENDED RELEASE ORAL at 08:07

## 2017-05-24 RX ADMIN — ATORVASTATIN CALCIUM 40 MG: 40 TABLET, FILM COATED ORAL at 20:29

## 2017-05-24 RX ADMIN — Medication 12.5 MG: at 08:05

## 2017-05-24 RX ADMIN — BUMETANIDE 0.5 MG: 0.5 TABLET ORAL at 08:05

## 2017-05-24 RX ADMIN — ALUMINUM HYDROXIDE, MAGNESIUM HYDROXIDE, AND DIMETHICONE 30 ML: 400; 400; 40 SUSPENSION ORAL at 10:07

## 2017-05-24 NOTE — PLAN OF CARE
Problem: Goal/Outcome  Goal: Goal Outcome Summary  PT: pt ambulating in hallways with FWW, SBA. Focused on dynamic balance challenges with and without UE support; pt making improvements towards goals.

## 2017-05-24 NOTE — PLAN OF CARE
"Problem: Goal/Outcome  Goal: Goal Outcome Summary  FOCUS/GOAL  Bowel management, Bladder management, Pain management and Mobility     ASSESSMENT, INTERVENTIONS AND CONTINUING PLAN FOR GOAL:  Pt is alert and oriented, denies c/o shortness of breath. C/O abdominal discomfort this morning, described as feeling \"sore\" and \"crampy\". Given Mylanta per PRN orders with temporary relief of symptoms reported. During 1030 therapy session, pt requested to return to room d/t abd discomfort and wanted to use the bathroom. Reports x3 BM on this shift, denies loose stools. Abdomen soft and nondistended, bs active x4. Fiona MOCTEZUMA informed of s/s, she was in to assess pt with no new orders received. Pt denies c/o abdominal discomfort at this time, reports \"is better\". Straight cath x1 on this shift for elevated PVR (see I/O flowsheet). Transfers with CGA and walker without difficulty. Will continue to monitor.           "

## 2017-05-24 NOTE — PROGRESS NOTES
[facility]  Internal Medicine Progress Note  Luke Henao  [id number]  May 24, 2017         Assessment & Plan:   Luke Henao is a 51 year old man with a history of DM II and tobacco use who was admitted to [facility] 3/26/17 w/ cardiogenic shock 2/2 inferior wall STEMI and underwent RCA aspiration thrombectomy and balloon angioplasty, IABP and initiation of dopamine, temp pacer, and transferred to [facility] 3/27-5/11/17 for consideration of mechanical support. Had PCI w/ 7 FEMI to RCA, LAD, LCx and required epinephrine in addition to dopamine; post procedure had distributive shock picture from infection (possible aspiration PNA) vs. post-MI SIRS response. Later again developed cardiogenic shock requiring replacement of IABP and MitraClip for ischemic MR. Now transferred to ARU for ongoing rehab therapies.       CAD, ICM. S/p inferior wall STEMI and had 7 FEMI to RCA, LAD, LCx on 3/27/17. Refractory cardiogenic shock. S/p MitraClip on 5/1/17 for ischemic MR. IABP d/c'd on 5/5. Most recent echo 5/2 w/ EF 30-35%, basal inferolateral hypokinesis, less than mild residual MR, mild pulm HTN. ARU course c/b soft BPs so lisinopril (2.5 mg BID) d/c'd on 5/18, resumed at 2.5mg qd 5/21. SBPs most recently 100s-110s and asymptomatic - improved. Weight stable at 123 lbs today, wt has been fluctuating from 118lb-123lb at ARU. No cardiac complaints at this time; no O2 needs or edema. Appears euvolemic on exam.   - Continue lisinopril 2.5 mg BID (increased 5/23 per cards recs)  - Continue Plavix x 1 year for FEMI then can change to lifelong ASA (MitraClip).   - Continue digoxin.   - Continue spironolactone 12.5 mg daily and Bumex 0.5 mg BID (was 1 mg BID on admit) w/ daily weights, I&Os, and 3 g Na/1800 cc fluid restrictions.   - Continue statin.   - No beta blocker in setting of recent HF decompensation; will be added back as OP.   - Cardiology not following here but would d/w HF rounding team if issues.       Urinary  Retention. No known hx BPH. Prolonged hospitalization w/ Chavis catheter. Was treated for E coli UTI; UA clear since. Intermittent urinary retention since d/c of Chavis prior to hospital d/c. Chavis replaced 5/12 and removed again 5/17. Flomax started 5/18. Still w/ intermittent retention - seems improved w/ Flomax and double void.   - Continue Flomax w/ close monitoring of BPs.   - Continue double void.   - PRN straight cath for PVR > 300 cc and no success w/ double void.   - May need OP urology f/u after d/c if persists.       DM II. HgbA1c 7.5%. Unknown home PTA regimen - had been given insulin but did not think he needed it so threw it away; verbalized a much better understanding of this today. BG controlled here on Lantus 25 units daily; not really using any sliding scale coverage - thus this was d/c'd on 5/22. BGs stable 108-145 over past 24h.   - Continue Lantus 25 units daily.   - Needs diabetic education and PCP prior to d/c. May be able to start oral regimen as outpatient.       Provoked DVT. Developed non occlusive clot in left IJ and left common femoral vein during acute hospitalization. Continue warfarin w/ INR goal 2-3 for anticipated 3-6 mo course. INR 2.70 today.    Non Severe Protein Calorie Malnutrition. Feeding tube d/c'd 5/18. Nutrition following.       Sinus Tachycardia. Persistent throughout acute hospitalization and ARU course. Attributed to deconditioning and recovering cardiac function. Not on beta blocker d/t recent HF exacerbation. Will be followed OP.       CVA. Developed left hand weakness and pain on 4/29 prompting stroke code. Head/neck CTA showed no hemorrhage but new areas of low attenuation in b/l hemispheres. Neuro consulted. On Plavix and statin for prevention.       Insomnia. Patient reports inability to sleep for a few nights now. Has not tried any medications. Trialed melatonin 3mg qhs with improvement. Patient does not want to try any additional medications, he feels once he gets  home, he will be able to rest well.      Hypokalemia. Started on K 20meq BID 5/23. K 3.7 today. Cont K supplementation. Will repeat K on 5/26 to ensure stability, especially in setting of lisinopril being increased 5/23.     Indigestion. Started this morning. Resolved with prn myalanta.       Resolved Problems   Lower GIB. Maroon BMs starting 4/11 w/ drop in hgb by ~1.5 g. Colonoscopy 4/17 showed rectal ulcer thought 2/2 tube but no lesions to intervene upon. Hgb stable w/o recurrence since. On Protonix for prophylaxis.   Hypoxic Respiratory Failure. Intubated 3/26-4/3 in setting of cardiogenic shock, and then re-intubated 4/25-4/28 d/t worsening hypoxia thought 2/2 aspiration PNA vs HCAP. Completed Zosyn 4/19-4/26, vanco 4/20-4/29, meropenem 4/26-5/2. Now on RA.       Medicine will follow.        Interval History:   Earlene is doing okay today. He had some indigestion this morning which resolved with myalanta. He had two regular bowel movements today. Denies any chest pain, sob, ord yspnea. No fevers or chills. No other concerns at this time.            Physical Exam:   Vitals were reviewed  Blood pressure 119/77, pulse 109, temperature 97.1  F (36.2  C), temperature source Oral, resp. rate 16, height 1.524 m (5'), weight 55.8 kg (123 lb), SpO2 98 %.  General:alert, NAD, resting in bed, pleasant and interactive  HEENT: NCAT, anicteric sclera, EOMI, MMM, no JVD  Cardiovascular: RRR, S1S2, no murmurs appreciated  Lungs:CTAB  Abdomen: normoactive BS, soft with mild distention and no tenderness   Vascular: no peripheral edema, distal pulses palpable  Neurologic: AAO X 3, CN 2-12 grossly intact, no focal deficits  Skin: no jaundice, rashes, or lesions on exposed areas of skin      Fiona Vivas  Internal Medicine [facility] Hospitalist  [phone]

## 2017-05-24 NOTE — PLAN OF CARE
Problem: Goal/Outcome  Goal: Goal Outcome Summary  Outcome: Therapy, progress toward functional goals as expected  SLP: pt needs repetition of directions, strategies for moderate complex tasks.  Tends to be impulsive at times, and needs cues to identify errors/correct in reasoning tasks.

## 2017-05-24 NOTE — PLAN OF CARE
Problem: Goal/Outcome  Goal: Goal Outcome Summary  Outcome: No Change  A&Ox4. VSS. Bed alarm on for safety. Melatonin given at bedtime. Patient was able to sleep through the night. Dressings to right upper chest and coccyx are clean, dry, and intact. Patient reported soreness and muscle pain in bilateral thighs, PRN tylenol 650 mg given providing relief. CGA with walker. Last BM 5/24. Unable to void overnight, bladder scanned for 360 and straight cath for 400 at 0700. Patient is able to make needs known. Continue to monitor.

## 2017-05-24 NOTE — PLAN OF CARE
Problem: Goal/Outcome  Goal: Goal Outcome Summary  Outcome: Improving  FOCUS/GOAL  Bowel management, Bladder management and Wound care management     Pt AOx3, uses the call light and is able to make needs known, bed alarms on for safety. Denies SOB, denies pain. Dressing of right side of chest changed; no signs of infection, dressing on coccyx dry and intact. Continues to experience urine retention with low PVRs this shift. Continue encouraging double voiding. No BMs this shift. Continue with POC.

## 2017-05-24 NOTE — PROGRESS NOTES
PM&R Daily Note:    Still needing some intermittent catheterization. Will begin teaching Bong how to self catheterize. Suspect he'll need moderate practice. Nursing to work with him on a plan about how to decide when to cath ie. Some interval or if unsuccessful emptying recognizing don't have bladder scan at home.    Continues to show some functional progress with balance and coordination  Close but not yet up independent in room.  Care conference rescheduled for tomorrow. Family will be present.    Discussed with hospitalist Fiona, appreciate collaboration.    Vitals:    05/24/17 0807 05/24/17 0812 05/24/17 0813 05/24/17 1624   BP: 119/77  119/77 110/73   BP Location: Right arm  Right arm Right arm   Pulse:   109 112   Resp:  16 16 16   Temp:  97.1  F (36.2  C) 97.1  F (36.2  C) 98.7  F (37.1  C)   TempSrc:  Oral Oral Oral   SpO2:   98%    Weight:       Height:         Alert, pleasant  No diaphoresis  Right upper chest wound redressed by me today including microklenze.  Packing now shallower, about 0.5 cm depth, slightly superior 12:00 undermining. Surrounding area no erythema  Linear incision above this healing well, ok now open to air.      lisinopril  2.5 mg Oral BID     potassium chloride  20 mEq Oral BID     finasteride  5 mg Oral Daily     pantoprazole  40 mg Oral QAM AC     cetirizine  10 mg Oral Daily     tamsulosin  0.4 mg Oral Daily     beta carotene  25,000 Units Oral Daily     ascorbic acid  500 mg Oral Daily     zinc sulfate  220 mg Oral Daily     bumetanide  0.5 mg Oral BID     senna-docusate  2 tablet Oral BID     atorvastatin  40 mg Oral or Feeding Tube QPM     clopidogrel  75 mg Oral Daily     digoxin  125 mcg Oral Daily     insulin glargine  25 Units Subcutaneous QAM AC     spironolactone  12.5 mg Oral Daily     Coordination of care:  15 minutes  Face-to-face:              20 minutes  Total time:                  35 minutes

## 2017-05-25 PROBLEM — I69.919 COGNITIVE DEFICITS AS LATE EFFECT OF CEREBROVASCULAR DISEASE: Status: ACTIVE | Noted: 2017-05-25

## 2017-05-25 PROBLEM — R41.89 IMPAIRED COGNITION: Status: ACTIVE | Noted: 2017-05-25

## 2017-05-25 LAB
GLUCOSE BLDC GLUCOMTR-MCNC: 112 MG/DL (ref 70–99)
GLUCOSE BLDC GLUCOMTR-MCNC: 146 MG/DL (ref 70–99)
INR PPP: 2.3 (ref 0.86–1.14)

## 2017-05-25 PROCEDURE — 12800006 ZZH R&B REHAB

## 2017-05-25 PROCEDURE — 40000133 ZZH STATISTIC OT WARD VISIT: Performed by: OCCUPATIONAL THERAPIST

## 2017-05-25 PROCEDURE — 97535 SELF CARE MNGMENT TRAINING: CPT | Mod: GO | Performed by: OCCUPATIONAL THERAPIST

## 2017-05-25 PROCEDURE — 97530 THERAPEUTIC ACTIVITIES: CPT | Mod: GP | Performed by: PHYSICAL THERAPIST

## 2017-05-25 PROCEDURE — 97532 ZZHC SP COGNITIVE SKILLS EA 15 MIN: CPT | Mod: GN | Performed by: SPEECH-LANGUAGE PATHOLOGIST

## 2017-05-25 PROCEDURE — S0138 FINASTERIDE, 5 MG: HCPCS | Performed by: PHYSICAL MEDICINE & REHABILITATION

## 2017-05-25 PROCEDURE — 97110 THERAPEUTIC EXERCISES: CPT | Mod: GO | Performed by: OCCUPATIONAL THERAPIST

## 2017-05-25 PROCEDURE — 99232 SBSQ HOSP IP/OBS MODERATE 35: CPT | Performed by: PHYSICIAN ASSISTANT

## 2017-05-25 PROCEDURE — 25000132 ZZH RX MED GY IP 250 OP 250 PS 637: Performed by: PHYSICAL MEDICINE & REHABILITATION

## 2017-05-25 PROCEDURE — 99366 TEAM CONF W/PAT BY HC PROF: CPT | Performed by: SPEECH-LANGUAGE PATHOLOGIST

## 2017-05-25 PROCEDURE — 99366 TEAM CONF W/PAT BY HC PROF: CPT | Performed by: PHYSICAL THERAPIST

## 2017-05-25 PROCEDURE — 25000132 ZZH RX MED GY IP 250 OP 250 PS 637: Performed by: PHYSICIAN ASSISTANT

## 2017-05-25 PROCEDURE — 25000132 ZZH RX MED GY IP 250 OP 250 PS 637: Performed by: NURSE PRACTITIONER

## 2017-05-25 PROCEDURE — T1013 SIGN LANG/ORAL INTERPRETER: HCPCS | Mod: U3

## 2017-05-25 PROCEDURE — 40000225 ZZH STATISTIC SLP WARD VISIT: Performed by: SPEECH-LANGUAGE PATHOLOGIST

## 2017-05-25 PROCEDURE — 00000146 ZZHCL STATISTIC GLUCOSE BY METER IP

## 2017-05-25 PROCEDURE — 97530 THERAPEUTIC ACTIVITIES: CPT | Mod: GO | Performed by: OCCUPATIONAL THERAPIST

## 2017-05-25 PROCEDURE — 85610 PROTHROMBIN TIME: CPT | Performed by: PHYSICIAN ASSISTANT

## 2017-05-25 PROCEDURE — 25000132 ZZH RX MED GY IP 250 OP 250 PS 637

## 2017-05-25 PROCEDURE — 36415 COLL VENOUS BLD VENIPUNCTURE: CPT | Performed by: PHYSICIAN ASSISTANT

## 2017-05-25 PROCEDURE — 40000193 ZZH STATISTIC PT WARD VISIT: Performed by: PHYSICAL THERAPIST

## 2017-05-25 PROCEDURE — 40000193 ZZH STATISTIC PT WARD VISIT

## 2017-05-25 PROCEDURE — 97530 THERAPEUTIC ACTIVITIES: CPT | Mod: GP

## 2017-05-25 PROCEDURE — 99366 TEAM CONF W/PAT BY HC PROF: CPT | Performed by: OCCUPATIONAL THERAPIST

## 2017-05-25 PROCEDURE — 97116 GAIT TRAINING THERAPY: CPT | Mod: GP

## 2017-05-25 RX ORDER — WARFARIN SODIUM 4 MG/1
4 TABLET ORAL
Status: COMPLETED | OUTPATIENT
Start: 2017-05-25 | End: 2017-05-25

## 2017-05-25 RX ORDER — BETA-CAROTENE 7500 MCG
25000 CAPSULE ORAL DAILY
Qty: 2 CAPSULE | Refills: 0 | Status: SHIPPED | OUTPATIENT
Start: 2017-05-27 | End: 2017-12-22

## 2017-05-25 RX ORDER — TAMSULOSIN HYDROCHLORIDE 0.4 MG/1
0.4 CAPSULE ORAL DAILY
Qty: 60 CAPSULE | Refills: 0 | Status: SHIPPED | OUTPATIENT
Start: 2017-05-25 | End: 2018-10-01

## 2017-05-25 RX ORDER — DIGOXIN 125 MCG
125 TABLET ORAL DAILY
Qty: 60 TABLET | Refills: 0 | Status: SHIPPED | OUTPATIENT
Start: 2017-05-25 | End: 2017-07-19

## 2017-05-25 RX ORDER — WARFARIN SODIUM 3 MG/1
3 TABLET ORAL DAILY
Qty: 30 TABLET | Refills: 0 | Status: SHIPPED | OUTPATIENT
Start: 2017-05-25 | End: 2017-06-20

## 2017-05-25 RX ORDER — ZINC SULFATE 50(220)MG
220 CAPSULE ORAL DAILY
Qty: 2 CAPSULE | Refills: 0 | Status: SHIPPED | OUTPATIENT
Start: 2017-05-25 | End: 2018-10-01

## 2017-05-25 RX ORDER — ASCORBIC ACID 500 MG
500 TABLET ORAL DAILY
Qty: 2 TABLET | Refills: 0 | Status: SHIPPED | OUTPATIENT
Start: 2017-05-27 | End: 2017-12-22

## 2017-05-25 RX ORDER — BUMETANIDE 0.5 MG/1
0.5 TABLET ORAL
Qty: 120 TABLET | Refills: 0 | Status: SHIPPED | OUTPATIENT
Start: 2017-05-25 | End: 2017-07-03

## 2017-05-25 RX ORDER — WARFARIN SODIUM 3 MG/1
3 TABLET ORAL
Status: DISCONTINUED | OUTPATIENT
Start: 2017-05-25 | End: 2017-05-25

## 2017-05-25 RX ORDER — PANTOPRAZOLE SODIUM 40 MG/1
40 TABLET, DELAYED RELEASE ORAL DAILY
Qty: 60 TABLET | Refills: 0 | Status: SHIPPED | OUTPATIENT
Start: 2017-05-25 | End: 2017-07-19

## 2017-05-25 RX ORDER — CONTAINER,EMPTY
1 EACH MISCELLANEOUS
Qty: 1 EACH | Refills: 0 | Status: SHIPPED | OUTPATIENT
Start: 2017-05-25 | End: 2018-10-01

## 2017-05-25 RX ORDER — CETIRIZINE HYDROCHLORIDE 10 MG/1
10 TABLET ORAL DAILY
Qty: 60 TABLET | Refills: 0 | Status: SHIPPED | OUTPATIENT
Start: 2017-05-25 | End: 2017-07-19

## 2017-05-25 RX ORDER — CLOPIDOGREL BISULFATE 75 MG/1
75 TABLET ORAL DAILY
Qty: 60 TABLET | Refills: 0 | Status: SHIPPED | OUTPATIENT
Start: 2017-05-25 | End: 2017-07-19

## 2017-05-25 RX ORDER — ATORVASTATIN CALCIUM 40 MG/1
40 TABLET, FILM COATED ORAL DAILY
Qty: 60 TABLET | Refills: 0 | Status: SHIPPED | OUTPATIENT
Start: 2017-05-25 | End: 2017-07-19

## 2017-05-25 RX ORDER — ACETAMINOPHEN 325 MG/1
650 TABLET ORAL EVERY 4 HOURS PRN
Qty: 100 TABLET | Refills: 0 | Status: SHIPPED | OUTPATIENT
Start: 2017-05-25 | End: 2017-08-17

## 2017-05-25 RX ORDER — AMOXICILLIN 250 MG
1 CAPSULE ORAL 2 TIMES DAILY
Qty: 60 TABLET | Refills: 0 | Status: SHIPPED | OUTPATIENT
Start: 2017-05-25 | End: 2017-06-02

## 2017-05-25 RX ORDER — POTASSIUM CHLORIDE 1500 MG/1
20 TABLET, EXTENDED RELEASE ORAL 2 TIMES DAILY
Qty: 120 TABLET | Refills: 0 | Status: SHIPPED | OUTPATIENT
Start: 2017-05-25 | End: 2017-07-19

## 2017-05-25 RX ORDER — LISINOPRIL 2.5 MG/1
2.5 TABLET ORAL 2 TIMES DAILY
Qty: 120 TABLET | Refills: 0 | Status: SHIPPED | OUTPATIENT
Start: 2017-05-25 | End: 2017-07-03

## 2017-05-25 RX ORDER — GLUCOSAMINE HCL/CHONDROITIN SU 500-400 MG
CAPSULE ORAL
Qty: 100 EACH | Refills: 3 | Status: SHIPPED | OUTPATIENT
Start: 2017-05-25 | End: 2017-08-17

## 2017-05-25 RX ORDER — FINASTERIDE 5 MG/1
5 TABLET, FILM COATED ORAL DAILY
Qty: 60 TABLET | Refills: 0 | Status: SHIPPED | OUTPATIENT
Start: 2017-05-25 | End: 2017-07-19

## 2017-05-25 RX ORDER — SPIRONOLACTONE 25 MG/1
12.5 TABLET ORAL DAILY
Qty: 30 TABLET | Refills: 0 | Status: SHIPPED | OUTPATIENT
Start: 2017-05-25 | End: 2017-07-03

## 2017-05-25 RX ADMIN — ZINC SULFATE CAP 220 MG (50 MG ELEMENTAL ZN) 220 MG: 220 (50 ZN) CAP at 09:10

## 2017-05-25 RX ADMIN — POTASSIUM CHLORIDE 20 MEQ: 750 TABLET, FILM COATED, EXTENDED RELEASE ORAL at 19:23

## 2017-05-25 RX ADMIN — Medication 25000 UNITS: at 09:10

## 2017-05-25 RX ADMIN — CLOPIDOGREL BISULFATE 75 MG: 75 TABLET, FILM COATED ORAL at 09:10

## 2017-05-25 RX ADMIN — WARFARIN SODIUM 4 MG: 4 TABLET ORAL at 17:13

## 2017-05-25 RX ADMIN — ACETAMINOPHEN 650 MG: 325 TABLET ORAL at 21:13

## 2017-05-25 RX ADMIN — BUMETANIDE 0.5 MG: 0.5 TABLET ORAL at 17:13

## 2017-05-25 RX ADMIN — LISINOPRIL 2.5 MG: 2.5 TABLET ORAL at 09:09

## 2017-05-25 RX ADMIN — ACETAMINOPHEN 650 MG: 325 TABLET ORAL at 09:20

## 2017-05-25 RX ADMIN — ACETAMINOPHEN 650 MG: 325 TABLET ORAL at 02:26

## 2017-05-25 RX ADMIN — POTASSIUM CHLORIDE 20 MEQ: 750 TABLET, FILM COATED, EXTENDED RELEASE ORAL at 09:10

## 2017-05-25 RX ADMIN — ACETAMINOPHEN 650 MG: 325 TABLET ORAL at 17:13

## 2017-05-25 RX ADMIN — DIGOXIN 125 MCG: 125 TABLET ORAL at 09:12

## 2017-05-25 RX ADMIN — FINASTERIDE 5 MG: 5 TABLET, FILM COATED ORAL at 09:11

## 2017-05-25 RX ADMIN — TAMSULOSIN HYDROCHLORIDE 0.4 MG: 0.4 CAPSULE ORAL at 09:09

## 2017-05-25 RX ADMIN — ATORVASTATIN CALCIUM 40 MG: 40 TABLET, FILM COATED ORAL at 19:23

## 2017-05-25 RX ADMIN — INSULIN GLARGINE 25 UNITS: 100 INJECTION, SOLUTION SUBCUTANEOUS at 09:05

## 2017-05-25 RX ADMIN — OXYCODONE HYDROCHLORIDE AND ACETAMINOPHEN 500 MG: 500 TABLET ORAL at 09:09

## 2017-05-25 RX ADMIN — BUMETANIDE 0.5 MG: 0.5 TABLET ORAL at 09:10

## 2017-05-25 RX ADMIN — LISINOPRIL 2.5 MG: 2.5 TABLET ORAL at 19:23

## 2017-05-25 RX ADMIN — CETIRIZINE HYDROCHLORIDE 10 MG: 5 TABLET ORAL at 09:10

## 2017-05-25 RX ADMIN — PANTOPRAZOLE SODIUM 40 MG: 40 TABLET, DELAYED RELEASE ORAL at 09:10

## 2017-05-25 RX ADMIN — SENNOSIDES AND DOCUSATE SODIUM 2 TABLET: 8.6; 5 TABLET ORAL at 09:09

## 2017-05-25 RX ADMIN — Medication 12.5 MG: at 09:10

## 2017-05-25 NOTE — PLAN OF CARE
"Problem: Goal/Outcome  Goal: Goal Outcome Summary  Outcome: No Change  FOCUS/GOAL  Bladder management, Medical management and Mobility     ASSESSMENT, INTERVENTIONS AND CONTINUING PLAN FOR GOAL:  Pt a&ox4. Denied pain and nausea/stomach upset this shift. CGA with a walker when out of bed, Pt voided x3 on toilet in br this shift. PVRs: 373, encouraged pt to double void, PVR then 156. Later voided on toilet and PVR: 92. Discussed with pt that going forward nursing staff will teach him how to self cath, pt appears to not understand and states \"Dr is going to set appointment with specialist to fix it.\" Will re discuss with next voiding attempt. Held Lisinopril at hs, and gave Melatonin. Pt daughter is sleeping in room. Continue with poc.       "

## 2017-05-25 NOTE — PROGRESS NOTES
CLINICAL NUTRITION SERVICES - REASSESSMENT NOTE     Nutrition Prescription    RECOMMENDATIONS FOR MDs/PROVIDERS TO ORDER:  None today    Malnutrition Status:    Patient does not meet two of the above criteria necessary for diagnosing malnutrition    Recommendations already ordered by Registered Dietitian (RD):  None today    Future/Additional Recommendations:  None today       EVALUATION OF THE PROGRESS TOWARD GOALS   Diet: 3 gm Sodium Diet + Ensure Clear between meals, 1800 mL fluid restriction  Intake: % of meals TID (both through room service and family brings in food) plus 100% Ensure Clear TID       NEW FINDINGS   - TF d/c 5/17  - Pt reports (via daughter interpreting) that his appetite has been improving and he is 'back to eating like his normal self'  - Weight has been stable over the last week    MALNUTRITION  % Intake: Decreased intake does not meet criteria  % Weight Loss: Weight loss does not meet criteria  Subcutaneous Fat Loss: None observed  Muscle Loss: None observed  Fluid Accumulation/Edema: None noted  Malnutrition Diagnosis: Patient does not meet two of the above criteria necessary for diagnosing malnutrition    Previous Goals   1. Per calorie counts, total avg nutritional intake to meet a minimum of 30 kcal/kg and 1.5 g PRO/kg daily (per dosing wt 54 kg).  Evaluation: Like met    Previous Nutrition Diagnosis  Inadequate oral intake related to reduced appetite, dislike of room service meals as evidenced by 5-day average of hugo cts meeting <75% est kcal/pro needs, however, improving with recent hugo cts from yesterday meeting 94% est kcal needs and 86% est pro needs.   Evaluation: Improving    CURRENT NUTRITION DIAGNOSIS  Predicted inadequate nutrient intake protein/energy related to variable appetite as evidenced by previously was eating <75% of assessed needs, however patients PO intake is back to baseline (per pt) and weight has remained stable over the last 1 week.       INTERVENTIONS  Implementation  Continue providing Ensure Clear TID per pt preference and weight maintenance     Goals  Patient to consume % of nutritionally adequate meal trays TID, or the equivalent with supplements/snacks.    Monitoring/Evaluation  Progress toward goals will be monitored and evaluated per protocol.    Chanel Parsons, Registration Eligable  Unit Pager:   [phone]

## 2017-05-25 NOTE — CONSULTS
"Diabetes Education  Received consult request to see this 51 year old male and his wife for diabetes education.  Patient is a  smoker with diabetes who was transferred from [facility] in cardiogenic shock after sustaining an inferior wall STEMI. He had a balloon angioplasty of the RCA prior to transfer. He was discharged to ARU, with plans to discharge home tomorrow.  Patient started on insulin therapy, with current dose of Lantus 25 units daily.    Met with patient, daughter, and professional Arabic . Unfortunately, wife had left after care conference.   Patient states about one year ago, he was prescribed a blood glucose monitor and Lantus insulin; he was seeing a provider at [facility].  When he went to local pharmacy to , states they only had the \"tube\" (vial) of insulin, no syringes.  Because he had not had education on use, he did not take.   He was referred to [facility] for diabetes education, but did not go.  He is concerned that the insulin pen will not be covered by insurance; this educator did call and confirm with discharge pharmacy that indeed the Solostar pen is covered.   He did check his blood glucoses three times a day, but was testing post-meal.  He thinks he has a One Touch brand monitor, and states his meter is in functioning order, and that his test strips are not .  Daughter will go to his home this afternoon, and confirm brand.    Discussed testing blood glucose pre-meal, with target range of  mg/dL.  Discussed if his glucose is over 240 mg/dL for more than one day, he should call his PCP.  Discussed if his glucose is under 70 mg/dL, he should treat with 15 grams CHO, such as 1/2 cup juice, recheck glucose in 15 minutes, and repeat treatment if glucose still under 70.    Demonstrated use of Lantus Solostar pen.  Patient practiced x 2 and did well.    Discussed insulin storage and disposal.  Discussed sharps disposal.    Patient to establish " care with a new PCP.  He would benefit from further outpatient diabetes education.    When discharged, will need prescriptions:  1.  Insulin pen needles (4mm, 32 gauge)  2.  Alcohol swabs  3.  Sharps disposal container  4.  One Touch Ultra test strips (please confirm with daughter).    Mariella Culver MS RN CDE [facility]  [phone]

## 2017-05-25 NOTE — PLAN OF CARE
Problem: Goal/Outcome  Goal: Goal Outcome Summary  Outcome: Improving  Speech Language Therapy Discharge Summary     Reason for therapy discharge:    Discharged to home with outpatient therapy.     Progress towards therapy goal(s). See goals on Care Plan in [facility] electronic health record for goal details.  Goals met     Therapy recommendation(s):    Continued therapy is recommended.  Rationale/Recommendations:  SLP: pt has made progress in treatment.  Language skills are functional, with assist from interpretor PRN.  Noting improvement with cognitive communication skills, but not yet baseline.  Deficits noted for moderately complex reasoning/problem solving, flexible attention, recent and working memory.  At times pt can be slightly impulsive in decision making, with cues to slow down helpful.  He does acknowledge gains he has made.  Pt will receive support and help from family at discharge.

## 2017-05-25 NOTE — PROGRESS NOTES
[facility]  Transitional Care Unit Progress Note  Luke Henao  [id number]  May 25, 2017         Assessment & Plan:   Luke Henao is a 51 year old man with a history of DM II and tobacco use who was admitted to [facility] 3/26/17 w/ cardiogenic shock 2/2 inferior wall STEMI and underwent RCA aspiration thrombectomy and balloon angioplasty, IABP and initiation of dopamine, temp pacer, and transferred to [facility] 3/27-5/11/17 for consideration of mechanical support. Had PCI w/ 7 FEMI to RCA, LAD, LCx and required epinephrine in addition to dopamine; post procedure had distributive shock picture from infection (possible aspiration PNA) vs. post-MI SIRS response. Later again developed cardiogenic shock requiring replacement of IABP and MitraClip for ischemic MR. Now transferred to ARU for ongoing rehab therapies.       CAD, ICM. S/p inferior wall STEMI and had 7 FEMI to RCA, LAD, LCx on 3/27/17. Refractory cardiogenic shock. S/p MitraClip on 5/1/17 for ischemic MR. IABP d/c'd on 5/5. Most recent echo 5/2 w/ EF 30-35%, basal inferolateral hypokinesis, less than mild residual MR, mild pulm HTN. ARU course c/b soft BPs so lisinopril (2.5 mg BID) d/c'd on 5/18, resumed at 2.5mg qd 5/21. SBPs most recently 100s-110s and asymptomatic - improved. Weight stable at 120 lbs today, wt has been fluctuating from 118lb-123lb at ARU. No cardiac complaints at this time; no O2 needs or edema. Appears euvolemic on exam.   - Continue lisinopril 2.5 mg BID (increased 5/23 per cards recs)  - Continue Plavix x 1 year for FEMI then can change to lifelong ASA (MitraClip).   - Continue digoxin.   - Continue spironolactone 12.5 mg daily and Bumex 0.5 mg BID (was 1 mg BID on admit) w/ daily weights, I&Os, and 3 g Na/1800 cc fluid restrictions.   - Continue statin.   - No beta blocker in setting of recent HF decompensation; will be added back as OP.   - Cardiology not following here but would d/w HF rounding team if  issues.       Urinary Retention. No known hx BPH. Prolonged hospitalization w/ Chavis catheter. Was treated for E coli UTI; UA clear since. Intermittent urinary retention since d/c of Chavis prior to hospital d/c. Chavis replaced 5/12 and removed again 5/17. Flomax started 5/18. Still w/ intermittent retention - seems improved w/ Flomax and double void.   - Continue Flomax w/ close monitoring of BPs.   - Continue double void.   - PRN straight cath for PVR > 300 cc and no success w/ double void.   - May need OP urology f/u after d/c if persists.       DM II. HgbA1c 7.5%. Unknown home PTA regimen - had been given insulin but did not think he needed it so threw it away; verbalized a much better understanding of this today. BG controlled here on Lantus 25 units daily; not really using any sliding scale coverage - thus this was d/c'd on 5/22. BGs stable 108-145 over past 24h.   - Continue Lantus 25 units daily.   - Spot check BG BID  - Needs diabetic education and PCP prior to d/c. May be able to start oral regimen as outpatient.       Provoked DVT. Developed non occlusive clot in left IJ and left common femoral vein during acute hospitalization. Continue warfarin w/ INR goal 2-3 for anticipated 3-6 mo course. INR 2.30 today.     Non Severe Protein Calorie Malnutrition. Feeding tube d/c'd 5/18. Nutrition following.       Sinus Tachycardia. Persistent throughout acute hospitalization and ARU course. Attributed to deconditioning and recovering cardiac function. Not on beta blocker d/t recent HF exacerbation. Will be followed OP.       CVA. Developed left hand weakness and pain on 4/29 prompting stroke code. Head/neck CTA showed no hemorrhage but new areas of low attenuation in b/l hemispheres. Neuro consulted. On Plavix and statin for prevention.       Insomnia. Patient reports inability to sleep for a few nights now. Has not tried any medications. Trialed melatonin 3mg qhs with improvement. Patient does not want to try any  additional medications, he feels once he gets home, he will be able to rest well.      Hypokalemia. Started on K 20meq BID 5/23. K 3.7 5/24. Cont K supplementation. Will repeat K on 5/26 to ensure stability, especially in setting of lisinopril being increased 5/23.     Resolved Problems   Lower GIB. Maroon BMs starting 4/11 w/ drop in hgb by ~1.5 g. Colonoscopy 4/17 showed rectal ulcer thought 2/2 tube but no lesions to intervene upon. Hgb stable w/o recurrence since. On Protonix for prophylaxis.   Hypoxic Respiratory Failure. Intubated 3/26-4/3 in setting of cardiogenic shock, and then re-intubated 4/25-4/28 d/t worsening hypoxia thought 2/2 aspiration PNA vs HCAP. Completed Zosyn 4/19-4/26, vanco 4/20-4/29, meropenem 4/26-5/2. Now on RA.   Indigestion. Resolved with myalanta.       Medicine will follow.        Interval History:   Earlene is doing well today. His stomach issues are completely resolved. Denies any chest pain, sob, or dyspnea. No fevers or chills. No bladder or bowel concerns.          Physical Exam:   Vitals were reviewed  Blood pressure 103/70, pulse 112, temperature 98.7  F (37.1  C), temperature source Oral, resp. rate 16, height 1.524 m (5'), weight 54.8 kg (120 lb 12.8 oz), SpO2 98 %.  General:alert, NAD, sitting up with therapy, feeling well  HEENT: NCAT, anicteric sclera, EOMI, MMM  Cardiovascular: RRR, S1S2, no m/r/g  Lungs:CTAB  Abdomen: normoactive BS, soft with no distention and no tenderness   Vascular: no peripheral edema, distal pulses palpable  Neurologic: AAO X 3, CN 2-12 grossly intact, no focal deficits  Skin: no jaundice, rashes, or lesions on exposed areas of skin       Fiona Vivas  Internal Medicine [facility] Hospitalist  [phone]

## 2017-05-25 NOTE — PROGRESS NOTES
Postural Assessment Scale for Stroke    Maintaining a posture  1. Sitting without support: 3  2. Standing with support: 3  3. Standing without support: 3  4. Standing on nonparetic le  5. Standing on paretic le    Changing posture  6. Supine to affected side lateral: 3  7. Supine to nonaffected side lateral: 3  8. Supine to sitting up on the edge of the table: 3  9. Sitting on the edge of the table to supine: 3  10. Sitting to standing up: 3  11. Standing up to sitting down: 3  12. Standing, picking up a pencil from the floor: 2    Total score:    31/36    Improved from 19/36 on admission.  Discuss w/ team for mod I in room, progress to SEC

## 2017-05-25 NOTE — DISCHARGE INSTRUCTIONS
Recommended Follow-Up:    -- Establish Primary care [facility] within 1-2 weeks.  You are scheduled to see Dr. eHrnández on Tuesday, June 6th at 10:00.    Address  [facility]                          [address]; [address] Floor                          [place], [place]  Phone   [phone]    -- INR draw  You are scheduled on Wednesday, May 31st at 2:00.    Address  [facility]                          [address]; [address] [place], [place]  Phone   [phone]    -- Follow-up with cardiology as scheduled on 6/8.    -- Follow-up with urology to follow up for urine retention and assessment of prostate.  You are scheduled to see Cherelle Meyers PA-C on Wednesday, May 31st at 11:00.    Address  [facility]                          [address]                          [place] [address]  Phone   [phone]    You can follow up with Dr. Flo Chacon on an as needed basis if any functional or rehab issues persist.    Address  Dr. Flo Chacon                           of [facility]                          Physical Medicine and Rehabilitation Clinic                          3rd Floor    [address] [place] [address]    Phone   [place]ana [phone]      For your pressure injury on your sacral area:  Use your  Geomat cushion  when sitting in a chair, wheel chair, or car until this wound is healed. You should position yourself, while in bed, from side to side.  Change your  Mepilex 4x4 dressing to this area every 3 days, more often if soiled or wet from the shower. If it does not heal in the next 4 weeks, you should make an appointment to see Teri Sepulveda at the [facility] [phone] to make an appointment. Otherwise [facility] has a [facility] [place] and you could see someone there [phone]. There is a PA, Keyana Menendez, there who would be happy to follow him

## 2017-05-25 NOTE — PLAN OF CARE
Problem: Goal/Outcome  Goal: Goal Outcome Summary  Outcome: No Change  Alert and oriented. Able to make needs known. Using call light appropriately. C/O bilateral leg pain. Received Tylenol 650 mg po x 1 with + relief. States he has been up to the BR to void. PVR below 300 so far this shift  At 0620 PVR was 192 ml.. Double voids encouraged. Up with CGA and walker.  Appears to be sleeping during rounds.  Daughter at bedside, rooming in. Continue with plan of care.

## 2017-05-25 NOTE — PLAN OF CARE
Problem: Goal/Outcome  Goal: Goal Outcome Summary  OT-Pt is doing well and has made good progress towards goals.

## 2017-05-25 NOTE — PLAN OF CARE
Problem: Goal/Outcome  Goal: Goal Outcome Summary  Outcome: No Change  Patient is alert and he does direct his cars using some English. Daughter (Camtu) is at the bedside and is very supportive and helpful.Patient had teaching on the insulin pen by the Diabetic RN this afternoon. Patient needs further instructions and hands on with this. Patient voided 50 cc at 1130 and he was PVR for 369 cc. Patient tried x 3 to void but was unable to go. I finally told him that I need to straight cath him. I straight cath him for 400 cc at 1300. I talked through the straight cath procedure to the patient and the daughter.  I obtained all the supplies and they are at the bedside. I also printed up instructions on self cath and gave them to the daughter and went over all the supplies with her. Please continue with the teaching and have the patient try to straight cath himself this pm. Patient will not be able to be discharged tomorrow until he is able to straight cath himself. Please document patient's progress on the insulin pen and the straight cath.   Weight obtained and VSS WNL. Patient continues to be on 1800 cc fluid restriction. I placed the water pitcher on the closet self and I instructed the daughter that we need to know how much he takes in.   Patient medicated with tylenol this for bilateral leg pain and did obtain relief.

## 2017-05-25 NOTE — PROGRESS NOTES
[facility] [place]   Physical Medicine and Rehabilitation Daily Note    51-year-old with functional impairments in mobility, activities of daily living and mild cognition following month and a half hospital stay starting with significant myocardial infarction with cardiogenic shock, but also ischemic stroke watershed distribution affecting the left side greater than right. Admitted to acute rehab 5/12/17.           Assessment and Plan of Care:   Formal care conference held today. please see separate document in Plan of Care tab for full details. Briefly Luke has shown some nice functional gains. Bladder emptying is on the cusp of voiding on own vs needing some occasional intermittent catheterization. If any further cathing needs will have RN teach self cath. Now up independent with walker but hoping to transition to just a cane. Building on some balance consistency and endurance from cardiac perspective. On track for discharge as early as Friday if bladder emptying plan established. with some ongoing outpatient cardiac, PT, OT and SLP at [facility]. Will also give HEP prior to discharge.    Diabetic educator saw today, demonstrated ok management of PEN. Ordered some supplies. Will go without short acting insulin.    Urine retention: persists intermittently needing catheterization still. Beginning training for self cath.  Continue tamsulosin and finasteride. F/u urology scheduled for     Continue therapies and plan of care    Medical issues:  Appreciate hospitalist consult, reviewed plan.  Cards: lisinopril 2.5 BID, bumex 0.5 BID and spironolactone 12.5 daily, digoxin 125 mcg daily. No BB for now with recent heart failure decompensation.  plavix for 1 year with stent.  Continues with warfarin target INR 2-3 for DVT, tx for 3-6 months, adjustemnts per pharmacy. F/u with primary care clinic for warfarin on Wed next week.  Hyperlipidemia on statin  Stage III coccygeal pressure ulcer  continues to slowly improve, dressing with mepilex and offloading, right upper chest open wound still from recent vascular access and balloon pump, nugauze packing in tract.   Tobacco dependence, doing ok with urges.          Review of Systems:   Still with some variable capacity to empty bladder.   No dysuria  No shortness of breath or chest pain.          Physical Exam:     Vitals:    05/24/17 2029 05/25/17 0624 05/25/17 0909 05/25/17 0912   BP: 103/70  112/79    BP Location:    Right arm   Pulse:       Resp:    16   Temp:    97.3  F (36.3  C)   TempSrc:    Oral   SpO2:    98%   Weight:  54.8 kg (120 lb 12.8 oz)     Height:         Alert, conversant. No diaphoresis  Heart RRR  Lungs clear  Arm strength now seems symmetric, still some proximal symmetric 4/5 shoulders, 5- more distally in arms. 4/5 hip flexion, 5- distally.         Data:   Scheduled meds    warfarin  4 mg Oral ONCE at 18:00     lisinopril  2.5 mg Oral BID     potassium chloride  20 mEq Oral BID     finasteride  5 mg Oral Daily     pantoprazole  40 mg Oral QAM AC     cetirizine  10 mg Oral Daily     tamsulosin  0.4 mg Oral Daily     beta carotene  25,000 Units Oral Daily     ascorbic acid  500 mg Oral Daily     zinc sulfate  220 mg Oral Daily     bumetanide  0.5 mg Oral BID     senna-docusate  2 tablet Oral BID     atorvastatin  40 mg Oral or Feeding Tube QPM     clopidogrel  75 mg Oral Daily     digoxin  125 mcg Oral Daily     insulin glargine  25 Units Subcutaneous QAM AC     spironolactone  12.5 mg Oral Daily       PRN meds:  acetaminophen, melatonin, glucose **OR** dextrose **OR** glucagon, Warfarin Therapy Reminder, - MEDICATION INSTRUCTIONS -, IV fluid REPLACEMENT ONLY       Coordination of care:  25 minutes  Face-to-face:              45 minutes  Total time:                  70 minutes

## 2017-05-26 ENCOUNTER — OFFICE VISIT (OUTPATIENT)
Dept: INTERPRETER SERVICES | Facility: CLINIC | Age: 52
End: 2017-05-26

## 2017-05-26 VITALS
WEIGHT: 118.5 LBS | BODY MASS INDEX: 23.26 KG/M2 | RESPIRATION RATE: 16 BRPM | SYSTOLIC BLOOD PRESSURE: 104 MMHG | HEIGHT: 60 IN | TEMPERATURE: 97.7 F | OXYGEN SATURATION: 97 % | HEART RATE: 107 BPM | DIASTOLIC BLOOD PRESSURE: 72 MMHG

## 2017-05-26 LAB
ANION GAP SERPL CALCULATED.3IONS-SCNC: 8 MMOL/L (ref 3–14)
BUN SERPL-MCNC: 15 MG/DL (ref 7–30)
CALCIUM SERPL-MCNC: 9 MG/DL (ref 8.5–10.1)
CHLORIDE SERPL-SCNC: 106 MMOL/L (ref 94–109)
CO2 SERPL-SCNC: 29 MMOL/L (ref 20–32)
CREAT SERPL-MCNC: 0.78 MG/DL (ref 0.66–1.25)
GFR SERPL CREATININE-BSD FRML MDRD: ABNORMAL ML/MIN/1.7M2
GLUCOSE BLDC GLUCOMTR-MCNC: 113 MG/DL (ref 70–99)
GLUCOSE BLDC GLUCOMTR-MCNC: 116 MG/DL (ref 70–99)
GLUCOSE SERPL-MCNC: 145 MG/DL (ref 70–99)
INR PPP: 2.55 (ref 0.86–1.14)
POTASSIUM SERPL-SCNC: 3.9 MMOL/L (ref 3.4–5.3)
SODIUM SERPL-SCNC: 143 MMOL/L (ref 133–144)

## 2017-05-26 PROCEDURE — 97116 GAIT TRAINING THERAPY: CPT | Mod: GP | Performed by: PHYSICAL THERAPIST

## 2017-05-26 PROCEDURE — 85610 PROTHROMBIN TIME: CPT | Performed by: PHYSICIAN ASSISTANT

## 2017-05-26 PROCEDURE — 25000132 ZZH RX MED GY IP 250 OP 250 PS 637: Performed by: NURSE PRACTITIONER

## 2017-05-26 PROCEDURE — 25000132 ZZH RX MED GY IP 250 OP 250 PS 637: Performed by: PHYSICIAN ASSISTANT

## 2017-05-26 PROCEDURE — T1013 SIGN LANG/ORAL INTERPRETER: HCPCS | Mod: U3

## 2017-05-26 PROCEDURE — 36415 COLL VENOUS BLD VENIPUNCTURE: CPT | Performed by: PHYSICIAN ASSISTANT

## 2017-05-26 PROCEDURE — 97530 THERAPEUTIC ACTIVITIES: CPT | Mod: GP | Performed by: PHYSICAL THERAPIST

## 2017-05-26 PROCEDURE — 99232 SBSQ HOSP IP/OBS MODERATE 35: CPT | Performed by: PHYSICIAN ASSISTANT

## 2017-05-26 PROCEDURE — 80048 BASIC METABOLIC PNL TOTAL CA: CPT | Performed by: PHYSICIAN ASSISTANT

## 2017-05-26 PROCEDURE — 40000193 ZZH STATISTIC PT WARD VISIT: Performed by: PHYSICAL THERAPIST

## 2017-05-26 PROCEDURE — 40000133 ZZH STATISTIC OT WARD VISIT: Performed by: STUDENT IN AN ORGANIZED HEALTH CARE EDUCATION/TRAINING PROGRAM

## 2017-05-26 PROCEDURE — 25000132 ZZH RX MED GY IP 250 OP 250 PS 637: Performed by: PHYSICAL MEDICINE & REHABILITATION

## 2017-05-26 PROCEDURE — 97535 SELF CARE MNGMENT TRAINING: CPT | Mod: GO | Performed by: STUDENT IN AN ORGANIZED HEALTH CARE EDUCATION/TRAINING PROGRAM

## 2017-05-26 PROCEDURE — S0138 FINASTERIDE, 5 MG: HCPCS | Performed by: PHYSICAL MEDICINE & REHABILITATION

## 2017-05-26 PROCEDURE — 00000146 ZZHCL STATISTIC GLUCOSE BY METER IP

## 2017-05-26 RX ORDER — WARFARIN SODIUM 3 MG/1
3 TABLET ORAL
Status: DISCONTINUED | OUTPATIENT
Start: 2017-05-26 | End: 2017-05-26 | Stop reason: HOSPADM

## 2017-05-26 RX ADMIN — Medication 25000 UNITS: at 07:52

## 2017-05-26 RX ADMIN — OXYCODONE HYDROCHLORIDE AND ACETAMINOPHEN 500 MG: 500 TABLET ORAL at 07:52

## 2017-05-26 RX ADMIN — ZINC SULFATE CAP 220 MG (50 MG ELEMENTAL ZN) 220 MG: 220 (50 ZN) CAP at 07:53

## 2017-05-26 RX ADMIN — POTASSIUM CHLORIDE 20 MEQ: 750 TABLET, FILM COATED, EXTENDED RELEASE ORAL at 07:53

## 2017-05-26 RX ADMIN — INSULIN GLARGINE 25 UNITS: 100 INJECTION, SOLUTION SUBCUTANEOUS at 07:52

## 2017-05-26 RX ADMIN — SENNOSIDES AND DOCUSATE SODIUM 2 TABLET: 8.6; 5 TABLET ORAL at 07:53

## 2017-05-26 RX ADMIN — CLOPIDOGREL BISULFATE 75 MG: 75 TABLET, FILM COATED ORAL at 08:06

## 2017-05-26 RX ADMIN — PANTOPRAZOLE SODIUM 40 MG: 40 TABLET, DELAYED RELEASE ORAL at 06:28

## 2017-05-26 RX ADMIN — FINASTERIDE 5 MG: 5 TABLET, FILM COATED ORAL at 07:52

## 2017-05-26 RX ADMIN — TAMSULOSIN HYDROCHLORIDE 0.4 MG: 0.4 CAPSULE ORAL at 07:53

## 2017-05-26 RX ADMIN — BUMETANIDE 0.5 MG: 0.5 TABLET ORAL at 07:53

## 2017-05-26 RX ADMIN — DIGOXIN 125 MCG: 125 TABLET ORAL at 07:52

## 2017-05-26 RX ADMIN — CETIRIZINE HYDROCHLORIDE 10 MG: 5 TABLET ORAL at 07:54

## 2017-05-26 RX ADMIN — Medication 12.5 MG: at 07:52

## 2017-05-26 NOTE — PLAN OF CARE
Problem: Goal/Outcome  Goal: Goal Outcome Summary  Outcome: No Change  FOCUS/GOAL  Bladder management and Medical management     ASSESSMENT, INTERVENTIONS AND CONTINUING PLAN FOR GOAL:  Pt A/O X 4. VSS. Sleep: normal. Daughter at bedside. Physical assessment at baseline. Denies pain, nausea, shortness of breath, and chest pain. Voiding in bathroom, PVR remained <300. Patient calling appropriately to have output measured and for bladder scan. Is on a regular diet, thin liquids. Bowels- audible and active in all four quadrants, is passing flatus, last BM 5/25. Pt up MOD I with walker. Pt is able to make needs known and the call light is within the pt's reach. Continue to monitor.

## 2017-05-26 NOTE — PLAN OF CARE
Problem: Goal/Outcome  Goal: Goal Outcome Summary  Pt is planning to d/c home today with continued family support and ongoing out patient therapy. No significant barriers noted at this time. Team working towards a safe d/c plan.

## 2017-05-26 NOTE — PROGRESS NOTES
[facility]  Transitional Care Unit Progress Note  Luke Henao  [id number]  May 26, 2017         Assessment & Plan:   Luke Henao is a 51 year old man with a history of DM II and tobacco use who was admitted to [facility] 3/26/17 w/ cardiogenic shock 2/2 inferior wall STEMI and underwent RCA aspiration thrombectomy and balloon angioplasty, IABP and initiation of dopamine, temp pacer, and transferred to [facility] 3/27-5/11/17 for consideration of mechanical support. Had PCI w/ 7 FEMI to RCA, LAD, LCx and required epinephrine in addition to dopamine; post procedure had distributive shock picture from infection (possible aspiration PNA) vs. post-MI SIRS response. Later again developed cardiogenic shock requiring replacement of IABP and MitraClip for ischemic MR. Now transferred to ARU for ongoing rehab therapies.       CAD, ICM. S/p inferior wall STEMI and had 7 FEMI to RCA, LAD, LCx on 3/27/17. Refractory cardiogenic shock. S/p MitraClip on 5/1/17 for ischemic MR. IABP d/c'd on 5/5. Most recent echo 5/2 w/ EF 30-35%, basal inferolateral hypokinesis, less than mild residual MR, mild pulm HTN. ARU course c/b soft BPs so lisinopril (2.5 mg BID) d/c'd on 5/18, resumed at 2.5mg qd 5/21. SBPs most recently 100s-110s and asymptomatic - improved. Weight stable at 118 lbs today, wt has been fluctuating from 118lb-123lb at ARU. No cardiac complaints at this time; no O2 needs or edema. Appears euvolemic on exam.   - Discharged to continue lisinopril 2.5 mg BID (increased 5/23 per cards recs)  - Discharged to continue plavix x 1 year for FEMI then can change to lifelong ASA (MitraClip).   - Discharged to continue digoxin 125mcg qd  - Discharged to continue spironolactone 12.5 mg daily and Bumex 0.5 mg BID (decreased from 1mg BID on admission)  - Discharged to continue statin  - Continue 3g Na diet and 1800cc FR on discharge - cardiology will determine when this can be liberalized   - No beta blocker intentionally  prescribed in setting of recent HF decompensation; will be added back as OP.       Urinary Retention. No known hx BPH. Prolonged hospitalization w/ Chavis catheter. Was treated for E coli UTI; UA clear since. Intermittent urinary retention since d/c of Chavis prior to hospital d/c. Chavis replaced 5/12 and removed again 5/17. Flomax started 5/18. Still w/ intermittent retention - seems improved w/ Flomax and double void.   - Continue Flomax w/ close monitoring of BPs.   - Continue double void.   - PRN straight cath for PVR > 300 cc and no success w/ double void.   - May need OP urology f/u after d/c if persists.       DM II. HgbA1c 7.5%. Unknown home PTA regimen - had been given insulin but did not think he needed it so threw it away; verbalized a much better understanding of this today. BG controlled here on Lantus 25 units daily; not really using any sliding scale coverage - thus this was d/c'd on 5/22. BGs stable 108-145 over past 24h.   - Continue Lantus 25 units daily.   - Spot check BG BID while inpatient and as needed on discharge  - Needs diabetic education and PCP prior to d/c. May be able to start oral regimen as outpatient.       Provoked DVT. Developed non occlusive clot in left IJ and left common femoral vein during acute hospitalization. Continue warfarin w/ INR goal 2-3 for anticipated 3-6 mo course. INR 2.55 today.  - Discharged with plan to take 3mg on 5/26, 3mg on 5/27, 1.5mg on 5/28, and 3mg on 5/29 with repeat INR 5/30  - Was prescribed 3mg tabs on discharge      Non Severe Protein Calorie Malnutrition. Feeding tube d/c'd 5/18. Nutrition following.       Sinus Tachycardia. Persistent throughout acute hospitalization and ARU course. Attributed to deconditioning and recovering cardiac function. Not on beta blocker d/t recent HF exacerbation. Will be followed OP.       CVA. Developed left hand weakness and pain on 4/29 prompting stroke code. Head/neck CTA showed no hemorrhage but new areas of low  attenuation in b/l hemispheres. Neuro consulted. On Plavix and statin for prevention.       Insomnia. Patient reports inability to sleep for a few nights now. Has not tried any medications. Trialed melatonin 3mg qhs with improvement. Patient does not want to try any additional medications, he feels once he gets home, he will be able to rest well.      Hypokalemia. Started on K 20meq BID 5/23. Lisinopril was changed to BID on 5/24. Checked K 5/26, stable at 3.9. Continue K 20meq BID on discharge.      Resolved Problems   Lower GIB. Maroon BMs starting 4/11 w/ drop in hgb by ~1.5 g. Colonoscopy 4/17 showed rectal ulcer thought 2/2 tube but no lesions to intervene upon. Hgb stable w/o recurrence since. On Protonix for prophylaxis.   Hypoxic Respiratory Failure. Intubated 3/26-4/3 in setting of cardiogenic shock, and then re-intubated 4/25-4/28 d/t worsening hypoxia thought 2/2 aspiration PNA vs HCAP. Completed Zosyn 4/19-4/26, vanco 4/20-4/29, meropenem 4/26-5/2. Now on RA.   Indigestion. Resolved with myalanta.       Medicine will sign off as patient is discharging. Discharge med rec completed by myself, please page with any questions or concerns. It has been a delight to help care for Earlene.       Interval History:   Earlene is doing well today. He denies any issues. Feels his fluid status is good. Denies any chest pain, sob, or dyspnea. No fevers or chills. Thinks he is voiding better. Indigestion has resolved. No other issues. Really wants to go home today .         Physical Exam:   Vitals were reviewed  Blood pressure 104/72, pulse 107, temperature 97.7  F (36.5  C), temperature source Oral, resp. rate 16, height 1.524 m (5'), weight 53.8 kg (118 lb 8 oz), SpO2 97 %.  General:alert, NAD, WDWN, very pleasant and interactive, sitting up in bed.   HEENT: NCAT, anicteric sclera, EOMI, mucous membranes pink and moist, no JVD appreciated  Cardiovascular: RRR, S1S2, no m/r/g  Lungs:CTAB, no wheezing  Abdomen: normoactive BS,  soft with non distention and no tenderness   Vascular: no peripheral edema, distal pulses palpable  Neurologic: AAO X 3, CN 2-12 grossly intact, no focal deficits  Skin: no jaundice, rashes, or lesions on exposed areas of skin       Fiona Vivas  Internal Medicine [facility]ist  [phone]

## 2017-05-26 NOTE — PLAN OF CARE
Problem: Goal/Outcome  Goal: Goal Outcome Summary  Outcome: Therapy, progress toward functional goals as expected  Physical Therapy Discharge Summary     Reason for therapy discharge:    Discharged to home with home therapy.     Progress towards therapy goal(s). See goals on Care Plan in [facility] electronic health record for goal details.  Goals partially met.  Barriers to achieving goals:   discharge from facility.     Therapy recommendation(s):    Continued therapy is recommended.  Rationale/Recommendations:  Pt would benefit from continued skilled PT intervention in order to continue to improve strength and mobility.  He will continue to need increased strength and balance in order to porgress amb to SEC or no AD.  Pt would also benefit from therapy to progress community mobility..

## 2017-05-26 NOTE — PLAN OF CARE
Problem: Goal/Outcome  Goal: Goal Outcome Summary  Occupational Therapy Discharge Summary     Reason for therapy discharge:    Discharged to home with outpatient therapy.     Progress towards therapy goal(s). See goals on Care Plan in [facility] electronic health record for goal details.  Goals met     Therapy recommendation(s):    Continued therapy is recommended.  Rationale/Recommendations:  Continued therapy to address functional cognition, progression of activity and LUE coordination and strength..           Problem: OT General Care Plan  Goal: Home Management (OT)  Home Management (OT)   Outcome: Adequate for Discharge Date Met:  05/26/17  Pt will have assist at home with IADL  Goal: Cognitive (OT)  Cognitive (OT)   Outcome: Adequate for Discharge Date Met:  05/26/17  Pt will have assist for IADL, defer to SLP and OP OT

## 2017-05-26 NOTE — PLAN OF CARE
Problem: Goal/Outcome  Goal: Goal Outcome Summary  FOCUS/GOAL  Bladder management and Medication management     ASSESSMENT, INTERVENTIONS AND CONTINUING PLAN FOR GOAL:  Pt alert and oriented, daughter, Cam, present and supportive. Requested prn tylenol x2 for leg pain with relief. Wound care done while Cam observed stating she can also show patient's wife. Nurse demonstrated use of flexpen for Lantus, and pad, then asked patient to do. He needed step by step instruction. Cam states family recently found out he was given insulin vials but he didn't have needles and nobody showed him how to use them so he never did it at home. Please have patient self administer Lantus tomorrow. Nurse explained how to self cath. Voided 5x in toilet hat this shift, please see flowsheet.

## 2017-05-26 NOTE — PLAN OF CARE
Problem: Goal/Outcome  Goal: Goal Outcome Summary  Outcome: Adequate for Discharge Date Met:  05/26/17  Pt A/O X 4. VSS.  Daughter at bedside. . Denies pain, nausea, shortness of breath, and chest pain. Voiding in bathroom, PVR was 315 at 11AM, pt performed straight cath on own. Pt Is on a regular diet, thin liquids. Bowels- audible and active in all four quadrants, is passing flatus, last BM 5/25. Pt up MOD I with walker. Pt is able to make needs known and the call light is within the pt's reach. Reviewed d/c info with patient and daughter. Pt d/c'd home with daughter

## 2017-05-26 NOTE — PHARMACY-ANTICOAGULATION SERVICE
Clinical Pharmacy- Warfarin Discharge Note  This patient is currently on warfarin for the treatment of DVT/PE Treatment.  INR Goal= 2-3  Anticoagulation Dose History     Recent Dosing and Labs Latest Ref Rng & Units 5/20/2017 5/21/2017 5/22/2017 5/23/2017 5/24/2017 5/25/2017 5/26/2017    Warfarin 0.5 mg - - - - 0.5 mg - - -    Warfarin 2.5 mg - 2.5 mg - 2.5 mg - - - -    Warfarin 3 mg - - 3 mg - - 3 mg - -    Warfarin 4 mg - - - - - - 4 mg -    INR 0.86 - 1.14 2.66(H) 2.47(H) 2.73(H) 3.49(H) 2.70(H) 2.30(H) 2.55(H)          Vitamin K doses administered during the last 7 days: None  FFP administered during the last 7 days: None    Recommend the patient take:  Warfarin 3 mg on 5/26  Warfarin 3 mg on 5/27  Warfarin 1.5 mg on 5/28  Warfarin 3 mg on 5/29    with a prescription for warfarin 3mg tablets.      The patient should have an INR checked May / 30 / 2017.

## 2017-05-26 NOTE — PROGRESS NOTES
Voiding has still been inconsistent, sometimes emptying well, others not. Did learn how to self intermittent catheterize if unable to void. Some supplies given. Has f/u with urology.  OK to discharge home today, full d/c summary dictated [id number]

## 2017-05-27 NOTE — DISCHARGE SUMMARY
DATE OF ADMISSION:  05/12/2017.       DATE OF DISCHARGE:  05/26/2017.      DISCHARGE DIAGNOSES:   1.  Myocardial infarction with cardiogenic shock, ischemic cardiomyopathy requiring intraaortic balloon pump and Karena clip for ischemic mitral regurgitation.   2.  Bilateral hemisphere ischemic stroke, left side predominant weakness.   3.  Impaired mobility, activities of daily living and cognition related to above.   4.  Diabetes mellitus.   5.  Urine retention.   6.  Stage III coccygeal ulcer.   7.  Right upper chest open wounds still from recent cardiac and arterial access.   8.  Deep vein thrombosis, left internal jugular and left femoral veins, on warfarin.   9.  Tobacco dependence, abstaining.   10.  Hyperlipidemia.   11.  Gastrointestinal bleed in acute hospital, none during acute rehabilitation.      HISTORY OF PRESENT ILLNESS:  Mr. Luke Henao is a 51-year-old [place] gentleman who presented to [facility] 03/26 with severe MI transferred to [facility] for further workup and management on the 27th.  He had about 6-week hospital stay with cardiogenic shock requiring intraaortic balloon pump and temporary pacing management.  Some ischemic mitral regurgitation required a Karena clip on 05/01.  He has coronary artery stents.  Hospitalization is also significant for ischemic stroke in bilateral hemispheres, though had some left side greater than right weakness.  This is mostly watershed distribution.  I direct the reader to the acute hospital discharge summary for details in that portion.  Functionally, he was starting to make some improvements, but definitely had ongoing impairments and transferred to the acute rehabilitation center, [facility] on 05/12 for comprehensive cares.      REHABILITATION COURSE:   1Grace Henao has participated in physical, occupational, speech therapy as well as rehab nursing, close management by Physiatry as well as  consultations with hospitalist service.  He has made some nice functional progress and is able to discharge home with support from his family.     2.  From a mobility standpoint, he is improving nicely.  He is on the cusp between using a walker for support versus a single-end cane in certain situations.  His endurance is picking up.   3.  The left side predominant weakness seems to be normalizing and symmetric with the right.  There is still some mild hand coordination deficits but he has some generalized weakness pattern somewhat bilaterally.   4.  Nutrition has picked up.  He has nasal feeding tube.  He is now eating regular diet, thin liquids by mouth and maintaining weights.   5.  Urine retention has persisted though also variable, often emptying his bladder better, sometimes with some retention that needed intermittent catheterization.  He was started on tamsulosin and subsequently finasteride.  This seems to moving in the right direction.  He will follow up with Urology for this next week as an outpatient.   6.  Diabetes, overall has improved.  He has not needed much sliding scale or meal coverage and these were felt to be a bit complex for [place].  He seems to be managing reasonably well with once daily Glargine only.   7.  Medically things have been relatively stable.  Some adjustments to his diuretics and cardiac medications.  He seemed rather euvolemic presently.  He continues on clopidogrel outline for 1 year related to his drug-eluting stent, after which outlined for aspirin lifelong.  He has digoxin and statin.  Recommended currently no beta blocker in the setting of heart failure decompensation, may be entertained as an outpatient with Cardiology.   8.  There is a line associated left internal jugular nonocclusive thrombosis as well as left common femoral vein thrombosis and is on warfarin, goal INR between 2 and 3.  This is anticipated for 3-6 months' duration.   9.  Hypokalemia, stable with replacement  given diuretics.      DISCHARGE MEDICATIONS:   1.  Finasteride 5 mg daily for urine retention.   2.  Tamsulosin 0.4 mg daily for urine retention.   3.  Potassium chloride 20 mEq twice daily for hypokalemia.   4.  Bumex 0.5 mg twice daily for ischemic cardiomyopathy.   5.  Lisinopril 2.5 mg twice daily for hypertension.   6.  Ischemic cardiomyopathy.   7.  Atorvastatin 40 mg daily for hyperlipidemia.   8.  Clopidogrel 75 mg daily.   9.  Digoxin 125 mcg daily.   10.  Insulin glargine 25 units in the morning.   11.  Pantoprazole 40 mg daily for recent gastrointestinal bleed.   12.  Spironolactone 12.5 mg daily.   13.  Warfarin, currently 3 mg daily, adjusting dose for target INR between 2 and 3.   14.  Various vitamins and supplements with a coccygeal ulcer including vitamin C, beta carotene, multivitamin.   15.  Cetirizine 10 mg daily for history of allergic rhinitis.   16.  Senna docusate 1 tablet twice daily as needed for constipation.   17.  Zinc sulfate 220 mg daily for supplementation.   18.  Acetaminophen 650 mg up to every 4 hours as needed for pain.      DISCHARGE ORDERS AND RECOMMENDATIONS:  Diet is now regular consistency with thin liquids, moderate consistent carbohydrate/diabetic.  Also, maximum 3 g sodium per day and fluid restriction 1800 mL per day.  Wound care to coccygeal ulcer daily, clean with MicroKlenz antibacterial spray, apply triad barrier cream to the wound, then cover with Mepilex dressing until this is healed over.  Right upper chest wound, daily remove packing, clean with antimicrobial MicroKlenz spray, gently repack with quarter-inch Nu Gauze strip until wound is very shallow.  Cover with dry dressing.  Activity:  Should utilize a cane or walker for support.  Oversight with medications, finances and complex decisions until cognition is better resolved.  No driving at this time.      Will continue with outpatient physical, occupational and speech therapies as well as cardiac rehabilitation  in the [facility].      FOLLOWUP APPOINTMENTS:  With Urology , physician assistant, Cherelle Meyers, for urinary retention, Anticoagulation Clinic at [facility], INR  establishing primary care, [facility], Dr. Glenn Brown, .  Follow up with Cardiology, Dr. Cortez, .  I can see him in Physical Medicine Rehabilitation Clinic on an as needed basis.      Greater than 30 minutes spent with discharge.         CLAUDIA RICHARDSON MD             D: 2017 08:49   T: 2017 11:38   MT: EDWARD      Name:     SYDNIE SIERRA   MRN:      [id number]        Account:        [id number]   :      1965           Admit Date:                                       Discharge Date: 2017      Document: [id number]       cc: Ron Brown MD

## 2017-05-30 ENCOUNTER — HOSPITAL ENCOUNTER (OUTPATIENT)
Dept: SPEECH THERAPY | Facility: CLINIC | Age: 52
Setting detail: THERAPIES SERIES
End: 2017-05-30
Attending: PHYSICAL MEDICINE & REHABILITATION
Payer: COMMERCIAL

## 2017-05-30 ENCOUNTER — HOSPITAL ENCOUNTER (OUTPATIENT)
Dept: OCCUPATIONAL THERAPY | Facility: CLINIC | Age: 52
Setting detail: THERAPIES SERIES
End: 2017-05-30
Attending: PHYSICAL MEDICINE & REHABILITATION
Payer: COMMERCIAL

## 2017-05-30 PROCEDURE — 40000211 ZZHC STATISTIC SLP  DEPARTMENT VISIT: Performed by: SPEECH-LANGUAGE PATHOLOGIST

## 2017-05-30 PROCEDURE — 97110 THERAPEUTIC EXERCISES: CPT | Mod: GO | Performed by: OCCUPATIONAL THERAPIST

## 2017-05-30 PROCEDURE — 97535 SELF CARE MNGMENT TRAINING: CPT | Mod: GO | Performed by: OCCUPATIONAL THERAPIST

## 2017-05-30 PROCEDURE — 40000125 ZZHC STATISTIC OT OUTPT VISIT: Performed by: OCCUPATIONAL THERAPIST

## 2017-05-30 PROCEDURE — 97166 OT EVAL MOD COMPLEX 45 MIN: CPT | Mod: GO | Performed by: OCCUPATIONAL THERAPIST

## 2017-05-30 PROCEDURE — 92523 SPEECH SOUND LANG COMPREHEN: CPT | Mod: GN,52 | Performed by: SPEECH-LANGUAGE PATHOLOGIST

## 2017-05-30 NOTE — PROGRESS NOTES
05/30/17 0900   Quick Adds   Type of Visit Initial Outpatient Occupational Therapy Evaluation       Present Yes   Language (Nicaraguan)   General Information   Start Of Care Date 05/30/17   Referring Physician Dr. Chacon   Orders Evaluate and treat as indicated   Other Orders SLP and CRB (per Dr. Chacon, orders for PT but patient not scheduled for OP PT)   Orders Date 05/25/17   Medical Diagnosis Cardiogenic shock, Cerebrovascular accident (CVA) due to embolism of cerebral artery   Onset of Illness/Injury or Date of Surgery 03/26/17   Special Instructions Oversight with medications, finances and complex decisions until cognition is better resolved.  No driving at this time per Dr. Chacon.   Surgical/Medical History Reviewed Yes   Additional Occupational Profile Info/Pertinent History of Current Problem 51-year-old [place] gentleman who presented to [facility] 03/26 with severe MI transferred to [facility] for further workup and management on the 27th.  He had about 6-week hospital stay with cardiogenic shock requiring intraaortic balloon pump and temporary pacing management.  Some ischemic mitral regurgitation required a Karena clip on 05/01.  He has coronary artery stents.  Hospitalization is also significant for ischemic stroke in bilateral hemispheres, though had some left side greater than right weakness.  This is mostly watershed distribution.  He was transferred to ARU on 5/12 and discharged home on 5/26.   Comments/Observations No family present for session, friend gave patient a ride.  Patient speaks English and can read English fairly well.   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  Hobbies:  running, gardening   Current Living  "Environment House   Number of Stairs to Enter Home 1   Number of Stairs Within Home 0   Primary Bathroom Set Up/Equipment Tub/Shower combo;Tub grab bar;Shower/tub chair   Prior Level - Transfers Independent   Prior Level - Ambulation Independent   Prior Level - ADLS Independent   Prior Responsibilities - IADL Yardwork;Driving;Work;Medication management;Finances   Prior Level Comments Patient was independent with all ADL/IADL's.  He has 4 children that live in [place] from his first marriage.    Current Assistive Devices - Mobility Front wheeled walker   Role/Living Environment Comments Wife is driving, completing finances, completing medication set up with pill boxing, cooking, cleaning, and laundry.  Patient's wife or step-daughter is with patient all the time.     Patient/family Goals Statement Patient wants to get back to work and driving.    Pain   Patient currently in pain Yes   Pain location legs   Pain comments Takes Tylenol every night   Cognitive Status Examination   Orientation Orientation to person, place and time   Level of Consciousness Alert   Follows Commands and Answers Questions 100% of the time;Able to follow single-step instructions   Personal Safety and Judgment Intact   Memory Impaired   Memory Comments \"My memory is getting better.\"   Cognitive Comment Per SLP, patient presenting with higher level cognitive deficits (attention, problem solving, memory).   See doc flowsheet for further assessments.   Visual Perception   Visual Perception (does not wear glasses.)   Visual Perception Comments \"Vision is much better now than before I got sick.\"  Denies double vision or blurriness.   Sensation   Upper Extremity Sensory Examination No deficits were identified   Range of Motion (ROM)   ROM Comments WNLs   Strength   Strength Comments B shoulders 4/5, B elbows 4+/5   Hand Strength   Hand Dominance Right   Left Hand  (pounds) 27.5 pounds   Right Hand  (pounds) 34 pounds   Left Lateral Pinch (pounds) " 11 pounds   Right Lateral Pinch (pounds) 14 pounds   Left Three Point Pinch (pounds) 9 pounds   Right Three Point Pinch (pounds) 10 pounds   Hand Strength Comments Both R and L  strength falls more than 2SD below the mean for his age group.   Coordination   Upper Extremity Coordination Left UE impaired   Left Hand, Nine Hole Peg Test (seconds) 31   Right Hand, Nine Hole Peg Test (seconds) 22   Coordination Comments L hand FMC falls 2 SD below the mean for his age group.   Balance   Balance Comments Patient uses 2WW for mobility.  No LOB during dynamic activity during session.     Functional Mobility   Functional Mobility Comments Noted after evaluation that patient was not scheduled for OP PT.  Will further assess and complete TUG screen next visit.   Transfer Skill   Level of [place]: Transfers independent   Weight-Bearing Restrictions weight-bearing as tolerated   Assistive Device rolling walker   Toilet Transfer   Toilet Transfer Comments Reports no difficulty.   Bathing   Level of [place] - Bathing stand-by assist   Physical Assist/Nonphysical Assist - Bathing supervision   Assistive Device shower chair;grab bars   Upper Body Dressing   Level of [place]: Dress Upper Body independent   Lower Body Dressing   Level of [place]: Dress Lower Body minimum assist (75% patients effort)   Lower Body Dressing Comments assistance with pants per patient   Toileting   Level of [place]: Toilet independent   Grooming   Level of [place]: Grooming independent   Eating/Self-Feeding   Level of [place]: Eating independent   Activity Tolerance   Activity Tolerance Patient denies any fatigue, does not take naps, and is walking daily.   Planned Therapy Interventions   Planned Therapy Interventions ADL training;IADL training;ROM;Self care/Home management;Strengthening;Coordination training;Therapeutic activities   OT Goal 1   Goal Identifier 1- Strength   Goal Description Patient to demonstrate  improved B   strength to 60# for increased ADL/IADL independence (home, gardening and work tasks, etc.).   Target Date 07/25/17   OT Goal 2   Goal Identifier 2-Dressing   Goal Description Patient will demonstrate modified independence with LE dressing.   Target Date 07/25/17   OT Goal 3   Goal Identifier 3-Community Mobility   Goal Description Pt will demonstrate modified I with community mobility to ensure safety with pathfinding, crossing street and manipulating dynamic hallway safely.   Target Date 07/25/17   OT Goal 4   Goal Identifier 4-Medications   Goal Description Patient to demonstrate 100% accuracy on setting up 4 new moderately complex medications for safe management of his personal medications at home.   Target Date 07/25/17   OT Goal 5   Goal Identifier 5-FMC   Goal Description Patient to demonstrate improved  L hand FM coordination by completing the 9-Hole-Peg Test in 25 seconds with L hand for increased independence with FM ADLs (manipulating buttons, zippers, handwriting and work tasks, etc.).   Target Date 07/25/17   Clinical Impression   Criteria for Skilled Therapeutic Interventions Met Yes, treatment indicated   OT Diagnosis decreased independence with ADL/IADL independence   Influenced by the following impairments incoordination, decreased hand strength, decreased functional mobility   Assessment of Occupational Performance 3-5 Performance Deficits   Identified Performance Deficits inability to return to work, complete home managment and community mobility tasks   Clinical Decision Making (Complexity) Moderate complexity   Therapy Frequency 2x/week (patient prefers only 1x/week)   Predicted Duration of Therapy Intervention (days/wks) 6   Risks and Benefits of Treatment have been explained. Yes   Patient, Family & other staff in agreement with plan of care Yes   Education Assessment   Barriers To Learning Cognitive;Language   Preferred Learning Style Demonstration;Pictures/video   Total  Evaluation Time   Total Evaluation Time 25

## 2017-05-31 ENCOUNTER — HOSPITAL ENCOUNTER (OUTPATIENT)
Dept: CARDIAC REHAB | Facility: CLINIC | Age: 52
End: 2017-05-31
Attending: PHYSICAL MEDICINE & REHABILITATION
Payer: COMMERCIAL

## 2017-05-31 ENCOUNTER — ANTICOAGULATION THERAPY VISIT (OUTPATIENT)
Dept: ANTICOAGULATION | Facility: CLINIC | Age: 52
End: 2017-05-31
Payer: COMMERCIAL

## 2017-05-31 VITALS — HEIGHT: 60 IN | BODY MASS INDEX: 23.16 KG/M2 | WEIGHT: 118 LBS

## 2017-05-31 DIAGNOSIS — I63.40 CEREBROVASCULAR ACCIDENT (CVA) DUE TO EMBOLISM OF CEREBRAL ARTERY (H): ICD-10-CM

## 2017-05-31 LAB — INR POINT OF CARE: 5.4 (ref 0.86–1.14)

## 2017-05-31 PROCEDURE — 40000116 ZZH STATISTIC OP CR VISIT

## 2017-05-31 PROCEDURE — 93798 PHYS/QHP OP CAR RHAB W/ECG: CPT

## 2017-05-31 PROCEDURE — 93797 PHYS/QHP OP CAR RHAB WO ECG: CPT

## 2017-05-31 PROCEDURE — 40000575 ZZH STATISTIC OP CARDIAC VISIT #2

## 2017-05-31 PROCEDURE — 99207 ZZC NO CHARGE NURSE ONLY: CPT

## 2017-05-31 PROCEDURE — 36416 COLLJ CAPILLARY BLOOD SPEC: CPT

## 2017-05-31 PROCEDURE — 85610 PROTHROMBIN TIME: CPT | Mod: QW

## 2017-05-31 ASSESSMENT — 6 MINUTE WALK TEST (6MWT)
GENDER SELECTION: MALE
MALE CALC: 1654.06
FEMALE CALC: 1910.65
GENDER SELECTION: MALE
PREDICTED: 1664.15
FEMALE CALC: 1910.65
PREDICTED: 1664.15
TOTAL DISTANCE WALKED (FT): 300
MALE CALC: 1654.06
TOTAL DISTANCE WALKED (FT): 300

## 2017-05-31 NOTE — PROGRESS NOTES
"[facility] Outpatient Rozkpb-Evtbxffc-Ynltbhwnl Evaluation   05/30/17 0800       Present Yes   Language (U[place]bek)   General Information   Type of Evaluation Cognitive-Linguistic;Speech and Language   Type Of Visit Initial   Start Of Care Date 05/30/17   Referring Physician Dr. Flo Chacon   Orders Evaluate And Treat   Onset Of Illness/injury Or Date Of Surgery 03/26/17   Hearing WNL for 1:1 conversation   Surgical/Medical history reviewed Yes   Pertinent History Of Current Problem 51-year-old gentleman who presented to [facility] on 3/26 with severe MI, [place] [facility] [place]up and management on 3/27. Patient had 6 week hospital stay with cardiogenic shock requiring intraaortic balloon pump and temporary pacing management. Some ischemic mitral regurgitation required a Karena clip on 5/1/17. He has coronary artery stents. Hospitalization is also significant for ischemic stroke in bilateral hemispheres, thous had some left side greater than right weakness. This is mostly watershed distribution. He was transferred to ARU on 5/12 and discharged home on 5/26.    Prior Level Of Function Comment Patient working fulltime for [employer], reports he reads newspapers and magazines in [place]. Uses the internet for [employer].    General Observations Patient frequently repeating that he \"feels much better\" and that memory is \"only a little bit different\" since his injury.    Patient/family Goals To return to work .    General Information Comments Highest level of education is 12th grade in [place]. Patient has lived in [place] years.    Speech   Speech Comments Pt denies concerns about speech deficits.    Language: Auditory Comprehension (understanding of spoken language)   Tests were administered at the following levels Moderate (routine daily activities)   Comments (Auditory Comprehension) Patient " demonstrating ability to follow 2-part instructions in session, if conditional present less ability to follow accurately. Some impulsivity with listening and acting prior to receiving full direction. Fair/good recall of story from Cognitive Lingusitic Quick Test - as translated by . Results to be taken with caution.    Language: Verbal Expression (use of spoken language to express information)   Tests were administered at the following levels Moderate (routine daily activities);Complex (vocation/community/social activities)   Generative Naming Score; Cognitive Linguistic Quick Test 4   Generative Naming; Cognitive Linguistic Quick Test Result Below mean   Functional Assessment Scale (Verbal Expression) Mild Impairment   Comments (Verbal Expression) Patient endorsing some difficulty with word-finding in English, not Citizen of [place]. He named 10/10 items on the CLQT namign task. He named 10 animals and 6 m-initial words on the CLQT generative naming task for a generative naming total score slightly below norm.     Reading Comprehension (understanding of written language)   Comments (Reading Comprehension) Did not assess per WNL report from acute rehab. No Citizen of [place] reading materials available during evaluation, patient reporting he reads mainly in Citizen of [place].    Written Expression (use of writing to express information)   Tests were administered at the following levels Moderate (routine daily activities)   Comments (Written Expression) Patient wrote his name and address with 100% accy. Reports he does very little writing for home/personal and no writing for vocational needs.    Cognitive Status Examination   Attention impaired   Short Term Memory impaired   Reasoning impaired   Executive Function Deficits Noted self-correction;self-monitoring;insight/awareness   Cognitive Status Exam Comments Patient completed Cognitive Linguistic Quick Test with . Scores to be taken with consideration of ESL status  and test is normed on English speaking patients. However, patient showing deficits in non-language based reasoning, memory, and attention tasks in other screens today. CLQT scores as follows: Attention=142 (Mild impairment), Memory=136 (Moderate impariment), Executive function=12 (severe impairment), Language-28 (Mild impairment), Visuospatial skills = 51 (moderate impairment), clock drawing score=9 (moderate impairment). Patient demonstrating difficulty self-monitoring and self-correcting mistakes. Some impulsivity in starting tasks before receiving full instructions from . Clock drawing missing numbers, hands of equal length, indistinguishable time. 3 word recall task short term memory assessment was 8/15. Patient unable to complete a complex alternating attention task with number-letter pattern.    Education Assessment   Barriers to Learning Language;Cognitive   Preferred Learning Style Listening;Demonstration;Pictures/video   General Therapy Interventions   Planned Therapy Interventions Language;Cognitive Treatment   Cognitive treatment Internal memory strategy training;External memory strategy training;Progressive attention training   Language Verbal expression   Clinical Impression, SLP Eval   Criteria for Skilled Therapeutic Interventions Met yes;treatment indicated   SLP Diagnosis Moderate cognitive-linguistic impairment   Functional limitations due to impairments Patient unable to complete complex language and cognitive tasks such as problem solving a new set of instructions at work or recalling details from a conversation with a medical provider or employer at same level of function as prior to injury.    Rehab potential affected by Patient reports he can only get a ride to treatment 1 time per week   Therapy Frequency 2 times;per week   Predicted Duration of Therapy Intervention (days/wks) 8 weeks   Risks and Benefits of Treatment have been explained. Yes   Patient, Family & other staff in  agreement with plan of care Yes   Clinical Impression Comments Mr. Henao presents with mild-moderate impairment to cognitive-linguistic skills evidenced by reduced performance on generative naming tasks and report of word-finding difficulty in English, reduced recall of auditory and visual information, reduced comprehension on complex listening tasks, reduced attention on complex flexible/alternating tasks, and reduced reasoning and executive function skills for visual problem solving tasks. Patient's scores on standardized tests (CLQT) to be considered with caution in light of ESL status and use of  for giving directions. Patient acknowledges that his memory is reduced, but may not have adequate insight into the severity of his cognitive and language changes post-injury. Reading not assessed today per WNL status from acute rehab. Patient's writing appears WNL for level of function needed to return to baseline. Patient will benefit from skilled intervention to train compensatory strategies for word finding, comprehension, memory, attention and reasoning.    Language/Cognition Goals   Language/Cognition Goals 1;2;3;4;5   Language/Cognition Goal 1   Goal Identifier Verbal Expression   Goal Description Patient will learn and demonstrate use of 2 word-finding strategies across 3/4 communication breakdowns in English or Vietnames with min cues to meet daily expressive language needs.    Target Date 08/19/17   Language/Cognition Goal 2   Goal Identifier Listening   Goal Description Patient will follow complex 2-3 part instructions in English or Malay with 90% accy to meet daily comprehension needs.    Target Date 08/19/17   Language/Cognition Goal 3   Goal Identifier Memory   Goal Description Patient will learn and demonstrate use of 2 internal or external memory support strategies to recall new auditory or visual information for 30 mins with min assist.    Target Date 08/19/17   Language/Cognition Goal 4    Goal Identifier Attention   Goal Description Patient will complete a complex attention task (flexible or alternating such as completing a verbal task while sorting cards) with 90% accy and min assist to meet daily attention needs.    Target Date 08/19/17   Language/Cognition Goal 5   Goal Identifier Reasoning   Goal Description Patient will complete a moderately complex visual or language-based reasoning task with use of 2 executive function strategies and min cues at 90% accy.    Target Date 08/19/17   Total Session Time   Total Evaluation Time 55     Thank you for your referral of this patient.      Mariangel Hayward MA, CCC-SLP  Speech-Language Pathology  [facility]  Phone: [phone]  Pager: [phone]

## 2017-05-31 NOTE — PROGRESS NOTES
[facility] CLARIFICATION NOTE FOR DISCHARGE  Lowest score for each FIM item supported by available charting  Discharge FIM scores taken from charting on 5/25/17.    Eating: FIM 7. Verbal clarification indicates patient was independent with eating a regular consistency diet.  Bathing: FIM 5. Charting indicates patient needed supervision for shower.     Bladder:  FIM 1. Notes indicate patient needed to be straight cathed by RN.  Bowel: FIM 6. Verbal clarification indicates patient was continent in the toilet and took medication.  Bowel Number of Accidents: 0  Toilet transfer: FIM 4. Charting indicates patient had CGA for toilet transfer during the night.  Comprehension: FIM 6. Charting and verbal clarification indicate patient had mild difficulty with understanding complex information at times.  Expression: FIM 7.  Charting and verbal clarification indicate patient was independent expressing complex information.  Social Interaction: FIM 7. INdependent.  Problem solving: FIM 6. Charting and verbal clarification indicate patient had mild difficulty solving complex problems.  Memory: FIM 4. Charting and verbal clarification indicate patient recalled information 75-90% of the time.

## 2017-05-31 NOTE — MR AVS SNAPSHOT
Luke DEBBI Henao   5/31/2017 1:45 PM   Anticoagulation Therapy Visit    Description:  51 year old male   Provider:  [facility];  ANTICO[facility]ULATION CLINIC   Department:   Anti Coagulation           INR as of 5/31/2017     Today's INR       Anticoagulation Summary as of 5/31/2017     INR goal 2.0-3.0   Today's INR    Full instructions 5/31: 1.5 mg; 6/1: 1.5 mg; Otherwise No maintenance plan   Next INR check 6/2/2017    Indications   Stroke (H) [I63.9]         Your next Anticoagulation Clinic appointment(s)     Jun 02, 2017  1:00 PM CDT   Anticoagulation Visit with  ANTICOAGULATION CLINIC   [facility]    [address] [address] [phone]              Contact Numbers     [facility]  Please call  [phone] to cancel and/or reschedule your appointment   Please call  [phone] with any problems or questions regarding your therapy.        May 2017 Details    Sun Mon Tue Wed Thu Fri Sat      1               2               3               4               5               6                 7               8               9               10               11               12               13                 14               15               16               17               18               19               20                 21               22               23               24               25               26               27                 28               29               30               31      1.5 mg   See details          Date Details   05/31 This INR check               How to take your warfarin dose     To take:  1.5 mg Take 0.5 of a 3 mg tablet.           June 2017 Details    Sun Mon Tue Wed Thu Fri Sat         1      1.5 mg         2            3                 4               5               6               7               8               9               10                 11               12                13               14               15               16               17                 18               19               20               21               22               23               24                 25               26               27               28               29               30                 Date Details   No additional details    Date of next INR:  6/2/2017         How to take your warfarin dose     To take:  1.5 mg Take 0.5 of a 3 mg tablet.

## 2017-05-31 NOTE — PROGRESS NOTES
05/31/17 0700   Session   Session Initial Evaluation and Exercise Prescription   Certified through this date 06/29/17   Cardiac Rehab Assessment  Luke Henao  51 year old  STEMI/ FEMI/ Mitral Clip      I have established, reviewed and made necessary changes to the individualized treatment plan and exercise prescription for this patient.    Physician Name (printed): ________________________   Date: _______  Time: ______    Physician Signature: ___________________________________________     Cardiac Rehab Assessment 5/31/17 Patient is a 51-year-old [place] gentleman who presented to [facility] on 03/26/17 with severe MI. He was transferred to [facility] for futher workup and management on the 27th. Patient spent 6 weeks in the hospital due to cardiogenic shock requiring intraaortic balloon pump and temporary pacing management. Patient had a lyla clip on 05/01/17. Patient also experienced ischemic stroke in bilateral hemispheres during hospitalization. He was tranferred to acute rehab after DC from hospital for physical, occupational and speech therapy. From a mobility standpoint, patient is currently using a walker or cane. Patient reports he is working towards walking without cane in PT/OT. Patient reports he feel very weak since leaving acute rehab last week. He reports most of the strength he had is now gone. He is very interested and motivated to return to his work as a  at [employer] Patient had been working 7 days per week working 9-10 hour shifts. Patient plans to return to work working 5 days per week in 8 hour shifts. Patient will benefit from skilled therapy to monitor CV response to exercise, to provide guidance in aerobic exercise to assist patient in returning to previous levels of activity/ employment.  present for initial evaluation today.    The patient's history and clinical status including hemodynamics and ECG were evaluated.  The patient was assessed to be stable and  appropriate to begin exercise.   The patient's functional capacity and exercise prescription were determined by the completion of the 6 minute walk test.  See results below.  The patient was oriented to the program.  Risk factor profile was completed. Goals and objectives were discussed. CV response was WNL. No symptoms, complaints or pain were reported. Good prognosis for reaching above goals. Skilled therapy is necessary in order to monitor CV response to exercise, to provide education on risk factors and behavior change counseling needed to achieve patient's goals.  Plan to progress to 30-40 minutes of exercise prior to discharge from cardiac rehab.  Initial THR of 20-30 beats above RHR; Effort rating of 4-6.  Initiate muscle conditioning as appropriate.  Provide risk factor education and behavior change counseling.    General Information   Treatment Diagnosis STEMI   Date of Treatment Diagnosis 03/26/17   Secondary Treatment Diagnosis Stent   Significant Past CV History None   Comorbidities DM   Other Medical History PAST MEDICAL HISTORY: No past medical history on file.    PAST SURGICAL HISTORY:   Past Surgical History:   Procedure Laterality Date     COLONOSCOPY N/A 4/17/2017    Procedure: COLONOSCOPY;  Surgeon: Rashaad Bundy MD;  Location:  GI     INSERT INTRAAORTIC BALLOON PUMP Right 4/19/2017    Procedure: INSERT INTRAAORTIC BALLOON PUMP;  Right Subclavian Intra Aortic Balloon Pump Insertion using Maquet 40cc Ballon Catheter, Implentation of 8mm Gelweave Woven Vascular Prosthesis, Removal of Left Femoral Ballon Pump Catheter, Flouroscopy;  Surgeon: Keshav Leung MD;  Location:  OR     PERCUTANEOUS MITRAL VALVE REPAIR N/A 5/1/2017    Procedure: PERCUTANEOUS MITRAL VALVE REPAIR ANESTHESIA;  Mitraclip Procedure Possible Cardiopulmonary Bypass ;  Surgeon: Ron Cortez MD;  Location:  OR     SUBCLAVIAN AORTIC VALVE IMPLANT N/A 5/8/2017    Procedure: SUBCLAVIAN AORTIC VALVE  IMPLANT;  Right Subclavian Graft Removal ;  Surgeon: Keshav Leung MD;  Location: [facility] OR      Lead up symptoms nausea (patient thought it was the flu)   Hospital Location [facility]/ [facility]   Hospital Discharge Date 05/26/17   Signs and Symptoms Post Hospital Discharge Sleep;Appetite;Fatigue   Outpatient Cardiac Rehab Start Date 05/31/17   Primary Physician Dr. Glenn Brown   Primary Physician Follow Up Scheduled   Surgeon Dr. Leung   Surgeon Follow Up NA   Cardiologist Dr. Cortez   Cardiologist Follow Up Scheduled   Ejection Fraction 30-35%   Risk Stratification High   Summary of Cath Report   Summary of Cath Report Available   Date Performed 05/01/17   Left Main mild luminal irregularities   LAD (stents)   D1 jailed by the LAD stents, but has EBER 3 flow with disease to 30% stenosis   LCX occluded at ostium   Ramus no longer present   RCA patent stents extending from the proximal to mid RCA, mRCA has 50%, dRCA 10%   PDA RPDA has widely patent stent, remainder of artery has mild disease   Living and Work Status    Living Arrangements and Social Status house   Support System Live with an adult   Return to Employment No   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 No   Have you fallen and had an injury in the past year? No   Referral Initiated to Physical Therapy Patient currently participating   Comment 5/31/17 Patient is currently attending PT, OT and speech therapy   Pain   Patient Currently in Pain Yes   Pain Location upper leg   Pain Rating 2/10   Pain Description Ache   Pain Description Comment 5/31/17 Patient reports his legs ache, which he feels is related to muscular dysptrophy/weakness from being in the hospital   Physical Assessments   Incisions WNL   Edema None   Right Lung Sounds normal    Left Lung Sounds normal   Limitations Other (see comments)  (mitral valve clip 5/2)   Individualized Treatment Plan   Monitored Sessions Scheduled 16   Monitored Sessions Attended 1   Oxygen   Supplemental Oxygen needed No   Nutrition Management - Weight Management   Assessment Initial Assessment   Age 51   Weight 53.5 kg (118 lb)   Height 1.524 m (5')   BMI (Calculated) 23.09   Initial Rate Your Plate Score. Dietary tool to assess eating patterns. Scores range from 24 to 72. The higher the score the healthier the eating pattern. (patient needs to complete survey)   Weight Management Comments 5/31/17 Patient is down 15# since coming into hospital. Patient is looking to maintain his weight.    Nutrition Management - Lipids   Lipids Labs Available   Date 03/22/17   Total Cholesterol 128   Triglycerides 76   HDL 36   LDL 77   Prescribed Lipid Medication Yes   Statin Intensity Intensity Not Indicated   Nutrition Management - Diabetes   Diabetes Type II   Do you Monitor BS at Home? Yes   Diabetes Medication Prescribed Yes, Insulin   Hb A1C Date: 04/07/17   Hb A1C Result: 7.1   Nutrition Management Summary   Dietary Recommendations Anticoagulation Therapy;Low Sodium;Diabetic   Stages of Change for Diet Compliance Action   Interventions Planned Educate on Weight Management Principles;Educate about Signs/Symptoms and Treatment of Hypo/Hyperglycemia;Educate on Benefits of Exercise;Instruct on Label Reading   Nutrition Summary Comments 5/31/17 Patient reports he is following a diabetic, anticoagulation, low sodium diet. His wife cooks for him and watches what he eats closely.   Nutrition Target Outcome Hb A1C < 7.0   Psychosocial Management   Psychosocial Assessment Initial   Is there history of clinical depression or increased risk of depression? No previous history   Current Level of Stress per Patient Report Mild   Current Coping Skills Uses Stress Management/Relaxation Techniques;Has Positive Support System   Initial  Patient Health Questionnaire -9 Score (PHQ-9) for depression. 5-9 Minimal symptoms, 10-14 Minor depression, 15-19 Major depression, moderately severe, > 20 Major depression, severe  15   Initial [employer] COOP Survey score.  Quality of Life:   If total score > 25 review individual areas where patient rated a 4 or 5.  Consider patients current medical condition and what role that plays on the score.   Adjust treatment protocol to improve areas of concern.  Consider the following:  PHQ9 score, DASI, and re-assessment within the next 30 days to assist with developing treatments.  25   Stages of Change Maintenance   Interventions Planned Patient to verbalize understanding of behavioral assessment results;Reassess PHQ-9 and/or [place] COOP Surveys if outside of defined limits   Psychosocial Comments 5/31/17 Patient reports his stress is well-managed. Patient denied feeling depressed when questioned. He did score an elevated score on PHQ-9. Patient will be reassessed within the month.    Psychosocial Target Outcome Identify absence or presence of depression using valid screening tool   Other Core Components - Hypertension   History of or Diagnosis of Hypertension Yes   Currently taking Anti-Hypertensives Yes;Ace Inhibitor   Other Core Components - Tobacco   History of Tobacco Use Yes   Quit Date or Planned Quit Date 11/01/16   Tobacco Use Status Former (Quit > 6 mo ago)   Tobacco Habit Cigarettes   Tobacco Use per Day (average) 1 PPD   Years of Tobacco Use 29   Stages of Change Maintenance   Tobacco Comments 5/31/17 Patient quit smoking when he was diagnosed with DMII last winter. Patient quit cold turkey and is very confident he will not smoke again. Patient is past the 6 month nivia and is now in the maintenance stage of change.   Other Core Components Summary   Interventions Planned Instruct patient on the DASH diet;List benefits of weight management;Educate on importance of monitoring daily weight;Instruct and  educate to self manage Heart Failure symptoms;Educate on the use of 'Stop Light' tool   Activity/Exercise History   Activity/Exercise Assessment Initial   Activity/Exercise Status prior to event? Was Physically Active   Number of Days Currently participating in Moderate Physical Activity? 0   Number of Days Currently performing  Aerobic Exercise (including rehab)? 7   Number of Minutes per Session Currently of Aerobic Exercise (average)? 15   Current Stage of Change (Physical Activity) Preparation   Current Stage of Change (Aerobic Exercise) Preparation   Patient Goals Goal #1;Goal #2   Goal #1 Description Patient will gain strength and endurance to be able to return to work 5 days per week working 8 hour shift.   Goal #1 Target Date 07/03/17   Goal #2 Description Patient will attend cardiac rehab 2 days per week to monitor CV response to exercise, to educate on CV related symptoms so patient is confident he's stable and can return to work.   Goal #2 Target Date 07/03/17   Activity/Exercise Comments 5/31/17 Patient has been working on balance, walking and stair climbing in rehab.    Activity/Exercise Target Outcome An Accumulation of 150  Minutes of Aerobic Activity per Week   Exercise Assessment   6 Minute Walk Predicted - Gender Selection Male   6 Minute Walk Predicted (Male) 1654.06   6 Minute Walk Predicted (Female) 1910.65   Initial 6 Minute Walk Distance (Feet) 300 ft   Resting  bpm   Exercise  bpm   Post Exercise  bpm   Resting BP 88/60   Exercise BP 98/60   Post Exercise BP 92/62   Pre  mg/dL   Effort Rating 6   Current MET Level 1.4   MET Level Goal 3-3.5   ECG Rhythm Sinus tachycardia   Ectopy PVCs   Current Symptoms Fatigue;Weakness   Limitations/Restrictions Other (see comments)  (mitral clip 5/2)   Exercise Prescription   Mode Nustep;Treadmill;Weights;Ambulation   Duration/Time 15-30 min;Intermittent bouts   Frequency 2 days/week   THR (85% of age predicted max HR) 145.35   OMNI  Effort Rating (0-10 Scale) 4-6/10   Progression Continuous bouts;Progress peak intensity by 1/4 MET per week;Total exercise time of 30-45 minutes   Recommended Home Exercise   Type of Exercise Walking   Frequency (days per week) 2-3   Duration (minutes per session) Intermittent;15-30 min   Effort Rating Recommended 4-6/10   Current Home Exercise   Type of Exercise Walking   Frequency (days per week) 7   Duration (minutes per session) 15   Follow-up/On-going Support   Provider follow-up needed on the following No follow-up needed   Learning Assessment   Learner Patient   Primary Language Other  (Guatemalan)   Preferred Learning Style Listening;Demonstration   Barriers to Learning Language   Patient Education   Education Comments 5/31/17 Patient will benefit from 1:1 education as patient needs

## 2017-05-31 NOTE — PROGRESS NOTES
Luke Henao  51 year old  STEMI/ stent/ Mitral Clip   05/31/17 1200   Session     Session 30 Day Individualized Treatment Plan   Certified through this date 07/09/17   Cardiac Rehab Assessment  Physician cosignature/electronic signature indicates approval of this ITP document. I have established, reviewed and made necessary changes to the individualized treatment plan and exercise prescription for this patient.   Cardiac Rehab Assessment 5/31/17 Patient is a 51-year-old [place] citizen gentleman who presented to [facility] on 03/26/17 with severe MI. He was transferred to [facility] for futher workup and management on the 27th. Patient spent 6 weeks in the hospital due to cardiogenic shock requiring intraaortic balloon pump and temporary pacing management. Patient had a lyla clip on 05/01/17. Patient also experienced ischemic stroke in bilateral hemispheres during hospitalization. He was tranferred to acute rehab after DC from hospital for physical, occupational and speech therapy. From a mobility standpoint, patient is currently using a walker or cane. Patient reports he is working towards walking without cane in PT/OT. Patient reports he feel very weak since leaving acute rehab last week. He reports most of the strength he had is now gone. He is very interested and motivated to return to his work as a  at [employer] Patient had been working 7 days per week working 9-10 hour shifts. Patient plans to return to work working 5 days per week in 8 hour shifts. Patient will benefit from skilled therapy to monitor CV response to exercise, to provide guidance in aerobic exercise to assist patient in returning to previous levels of activity/ employment.  present for initial evaluation today.   General Information   Treatment Diagnosis STEMI   Date of Treatment Diagnosis 03/26/17   Secondary Treatment Diagnosis Stent   Significant Past CV History None   Comorbidities DM   Other Medical History .[facility]    Lead up symptoms nausea (patient thought it was the flu)   Hospital Location [facility]/ [facility]   Hospital Discharge Date 05/26/17   Signs and Symptoms Post Hospital Discharge Sleep;Appetite;Fatigue   Outpatient Cardiac Rehab Start Date 05/31/17   Primary Physician Dr. Glenn Brown   Primary Physician Follow Up Scheduled   Surgeon Dr. Leung   Surgeon Follow Up NA   Cardiologist Dr. Cortez   Cardiologist Follow Up Scheduled   Ejection Fraction 30-35%   Risk Stratification High   Summary of Cath Report   Summary of Cath Report Available   Date Performed 05/01/17   Left Main mild luminal irregularities   LAD (stents)   D1 jailed by the LAD stents, but has EBER 3 flow with disease to 30% stenosis   LCX occluded at ostium   Ramus no longer present   RCA patent stents extending from the proximal to mid RCA, mRCA has 50%, dRCA 10%   PDA RPDA has widely patent stent, remainder of artery has mild disease   Living and Work Status    Living Arrangements and Social Status house   Support System Live with an adult   Return to Employment No   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 No   Have you fallen and had an injury in the past year? No   Referral Initiated to Physical Therapy Patient currently participating   Comment 5/31/17 Patient is currently attending PT, OT and speech therapy   Pain   Patient Currently in Pain Yes   Pain Location upper leg   Pain Rating 2/10   Pain Description Ache   Pain Description Comment 5/31/17 Patient reports his legs ache, which he feels is related to muscular dysptrophy/weakness from being in the hospital   Physical Assessments   Incisions WNL   Edema None   Right Lung Sounds normal   Left Lung Sounds normal   Limitations Other (see comments)  (mitral valve clip 5/2)   Individualized  Treatment Plan   Monitored Sessions Scheduled 16   Monitored Sessions Attended 1   Oxygen   Supplemental Oxygen needed No   Nutrition Management - Weight Management   Assessment Initial Assessment   Age 51   Weight 53.5 kg (118 lb)   Height 1.524 m (5')   BMI (Calculated) 23.09   Initial Rate Your Plate Score. Dietary tool to assess eating patterns. Scores range from 24 to 72. The higher the score the healthier the eating pattern. (patient needs to complete survey)   Weight Management Comments 5/31/17 Patient is down 15# since coming into hospital. Patient is looking to maintain his weight.    Nutrition Management - Lipids   Lipids Labs Available   Date 03/22/17   Total Cholesterol 128   Triglycerides 76   HDL 36   LDL 77   Prescribed Lipid Medication Yes   Statin Intensity Intensity Not Indicated   Nutrition Management - Diabetes   Diabetes Type II   Do you Monitor BS at Home? Yes   Diabetes Medication Prescribed Yes, Insulin   Hb A1C Date: 04/07/17   Hb A1C Result: 7.1   Nutrition Management Summary   Dietary Recommendations Anticoagulation Therapy;Low Sodium;Diabetic   Stages of Change for Diet Compliance Action   Interventions Planned Educate on Weight Management Principles;Educate about Signs/Symptoms and Treatment of Hypo/Hyperglycemia;Educate on Benefits of Exercise;Instruct on Label Reading   Nutrition Summary Comments 5/31/17 Patient reports he is following a diabetic, anticoagulation, low sodium diet. His wife cooks for him and watches what he eats closely.   Nutrition Target Outcome Hb A1C < 7.0   Psychosocial Management   Psychosocial Assessment Initial   Is there history of clinical depression or increased risk of depression? No previous history   Current Level of Stress per Patient Report Mild   Current Coping Skills Uses Stress Management/Relaxation Techniques;Has Positive Support System   Initial Patient Health Questionnaire -9 Score (PHQ-9) for depression. 5-9 Minimal symptoms, 10-14 Minor  depression, 15-19 Major depression, moderately severe, > 20 Major depression, severe  15   Initial [employer] COOP Survey score.  Quality of Life:   If total score > 25 review individual areas where patient rated a 4 or 5.  Consider patients current medical condition and what role that plays on the score.   Adjust treatment protocol to improve areas of concern.  Consider the following:  PHQ9 score, DASI, and re-assessment within the next 30 days to assist with developing treatments.  25   Stages of Change Maintenance   Interventions Planned Patient to verbalize understanding of behavioral assessment results;Reassess PHQ-9 and/or [place] COOP Surveys if outside of defined limits   Psychosocial Comments 5/31/17 Patient reports his stress is well-managed. Patient denied feeling depressed when questioned. He did score an elevated score on PHQ-9. Patient will be reassessed within the month.    Psychosocial Target Outcome Identify absence or presence of depression using valid screening tool   Other Core Components - Hypertension   History of or Diagnosis of Hypertension Yes   Currently taking Anti-Hypertensives Yes;Ace Inhibitor   Other Core Components - Tobacco   History of Tobacco Use Yes   Quit Date or Planned Quit Date 11/01/16   Tobacco Use Status Former (Quit > 6 mo ago)   Tobacco Habit Cigarettes   Tobacco Use per Day (average) 1 PPD   Years of Tobacco Use 29   Stages of Change Maintenance   Tobacco Comments 5/31/17 Patient quit smoking when he was diagnosed with DMII last winter. Patient quit cold turkey and is very confident he will not smoke again. Patient is past the 6 month nivia and is now in the maintenance stage of change.   Other Core Components Summary   Interventions Planned Instruct patient on the DASH diet;List benefits of weight management;Educate on importance of monitoring daily weight;Instruct and educate to self manage Heart Failure symptoms;Educate on the use of 'Stop Light' tool    Activity/Exercise History   Activity/Exercise Assessment Initial   Activity/Exercise Status prior to event? Was Physically Active   Number of Days Currently participating in Moderate Physical Activity? 0   Number of Days Currently performing  Aerobic Exercise (including rehab)? 7   Number of Minutes per Session Currently of Aerobic Exercise (average)? 15   Current Stage of Change (Physical Activity) Preparation   Current Stage of Change (Aerobic Exercise) Preparation   Patient Goals Goal #1;Goal #2   Goal #1 Description Patient will gain strength and endurance to be able to return to work 5 days per week working 8 hour shift.   Goal #1 Target Date 07/03/17   Goal #2 Description Patient will attend cardiac rehab 2 days per week to monitor CV response to exercise, to educate on CV related symptoms so patient is confident he's stable and can return to work.   Goal #2 Target Date 07/03/17   Activity/Exercise Comments 5/31/17 Patient has been working on balance, walking and stair climbing in rehab.    Activity/Exercise Target Outcome An Accumulation of 150  Minutes of Aerobic Activity per Week   Exercise Assessment   6 Minute Walk Predicted - Gender Selection Male   6 Minute Walk Predicted (Male) 1654.06   6 Minute Walk Predicted (Female) 1910.65   Initial 6 Minute Walk Distance (Feet) 300 ft   Resting  bpm   Exercise  bpm   Post Exercise  bpm   Resting BP 88/60   Exercise BP 98/60   Post Exercise BP 92/62   Pre  mg/dL   Effort Rating 6   Current MET Level 1.4   MET Level Goal 3-3.5   ECG Rhythm Sinus tachycardia   Ectopy PVCs   Current Symptoms Fatigue;Weakness   Limitations/Restrictions Other (see comments)  (mitral clip 5/2)   Exercise Prescription   Mode Nustep;Treadmill;Weights;Ambulation   Duration/Time 15-30 min;Intermittent bouts   Frequency 2 days/week   THR (85% of age predicted max HR) 145.35   OMNI Effort Rating (0-10 Scale) 4-6/10   Progression Continuous bouts;Progress peak  intensity by 1/4 MET per week;Total exercise time of 30-45 minutes   Recommended Home Exercise   Type of Exercise Walking   Frequency (days per week) 2-3   Duration (minutes per session) Intermittent;15-30 min   Effort Rating Recommended 4-6/10   Current Home Exercise   Type of Exercise Walking   Frequency (days per week) 7   Duration (minutes per session) 15   Follow-up/On-going Support   Provider follow-up needed on the following No follow-up needed   Learning Assessment   Learner Patient   Primary Language Other  (Turkish)   Preferred Learning Style Listening;Demonstration   Barriers to Learning Language   Patient Education   Education Comments 5/31/17 Patient will benefit from 1:1 education as patient needs

## 2017-05-31 NOTE — PROGRESS NOTES
ANTICOAGULATION FOLLOW-UP CLINIC VISIT    Patient Name:  Luke Henao  Date:  5/31/2017  Contact Type:  Face to Face    SUBJECTIVE:     Patient Findings     Positives Initiation of therapy           OBJECTIVE    INR Protime   Date Value Ref Range Status   05/31/2017 5.4 (A) 0.86 - 1.14 Final       ASSESSMENT / PLAN  INR assessment SUPRA    Recheck INR In: 2 DAYS    INR Location Clinic      Anticoagulation Summary as of 5/31/2017     INR goal 2.0-3.0   Today's INR 5.4!   Maintenance plan No maintenance plan   Full instructions 5/31: 1.5 mg; 6/1: 1.5 mg; Otherwise No maintenance plan   Next INR check 6/2/2017   Target end date     Indications   Stroke (H) [I63.9]         Anticoagulation Episode Summary     INR check location     Preferred lab     Send INR reminders to  [facility]    Comments             See the Encounter Report to view Anticoagulation Flowsheet and Dosing Calendar (Go to Encounters tab in chart review, and find the Anticoagulation Therapy Visit)        Shalonda Fitzgerald RN

## 2017-06-02 ENCOUNTER — ANTICOAGULATION THERAPY VISIT (OUTPATIENT)
Dept: ANTICOAGULATION | Facility: CLINIC | Age: 52
End: 2017-06-02
Payer: COMMERCIAL

## 2017-06-02 ENCOUNTER — OFFICE VISIT (OUTPATIENT)
Dept: UROLOGY | Facility: CLINIC | Age: 52
End: 2017-06-02
Payer: COMMERCIAL

## 2017-06-02 VITALS
OXYGEN SATURATION: 98 % | HEIGHT: 62 IN | HEART RATE: 118 BPM | WEIGHT: 120 LBS | DIASTOLIC BLOOD PRESSURE: 60 MMHG | SYSTOLIC BLOOD PRESSURE: 104 MMHG | BODY MASS INDEX: 22.08 KG/M2

## 2017-06-02 DIAGNOSIS — R33.9 URINARY RETENTION: Primary | ICD-10-CM

## 2017-06-02 LAB
ALBUMIN UR-MCNC: NEGATIVE MG/DL
APPEARANCE UR: CLEAR
BILIRUB UR QL STRIP: NEGATIVE
COLOR UR AUTO: YELLOW
GLUCOSE UR STRIP-MCNC: NEGATIVE MG/DL
HGB UR QL STRIP: NEGATIVE
INR POINT OF CARE: 3.2 (ref 0.86–1.14)
KETONES UR STRIP-MCNC: NEGATIVE MG/DL
LEUKOCYTE ESTERASE UR QL STRIP: NEGATIVE
NITRATE UR QL: NEGATIVE
PH UR STRIP: 5 PH (ref 5–7)
RESIDUAL VOLUME (RV) (EXTERNAL): 48
SP GR UR STRIP: 1.01 (ref 1–1.03)
URN SPEC COLLECT METH UR: NORMAL
UROBILINOGEN UR STRIP-ACNC: 0.2 EU/DL (ref 0.2–1)

## 2017-06-02 PROCEDURE — 51798 US URINE CAPACITY MEASURE: CPT | Performed by: PHYSICIAN ASSISTANT

## 2017-06-02 PROCEDURE — 99202 OFFICE O/P NEW SF 15 MIN: CPT | Mod: 25 | Performed by: PHYSICIAN ASSISTANT

## 2017-06-02 PROCEDURE — 85610 PROTHROMBIN TIME: CPT | Mod: QW

## 2017-06-02 PROCEDURE — 81003 URINALYSIS AUTO W/O SCOPE: CPT | Performed by: PHYSICIAN ASSISTANT

## 2017-06-02 PROCEDURE — 36416 COLLJ CAPILLARY BLOOD SPEC: CPT

## 2017-06-02 ASSESSMENT — PAIN SCALES - GENERAL: PAINLEVEL: NO PAIN (0)

## 2017-06-02 NOTE — LETTER
6/2/2017       RE: Luke Henao  [address]  [facility] [address]     Dear Colleague,    Thank you for referring your patient, Luke Henao, to the [facility] at [facility]. Please see a copy of my visit note below.    CC: Urinary retention during prolonged hospitalization.    HPI: It is a pleasure to see Mr. Luke Henao, a very pleasant 51 year old male seen today in the urology clinic in consultation from Dr. Swift for evaluation of urinary retention. This developed acutely following prolonged recovery from cardiogenic shock from inferior STEMI requiring RCA thrombectomy, temp pacer and intubation.  This occurred on 3/27/17. He was recently discharged from Acute Rehab on 5/12/17. During the hospitalization, he required prolonged Chavis catheter and subsequent intermittent cathetheterization.  Upon discharge, he continued Flomax and finasteride.  A PSA was checked during the hospitalization on 4/15/17 and was 4.5.     The patient has no prior history of AUR or similar symptoms. Currently denies fevers, chills, N/V, abdominal/back pain. Feels that he is overall doing well from a voiding standpoint and is happy to be back home.    Visit performed with the help of an .    Past Medical History:   Diagnosis Date     History of thrombophlebitis    STEMI  Mitral regurg  Ischemic Stroke  CAD  DM  DVT      Past Surgical History:   Procedure Laterality Date     COLONOSCOPY N/A 4/17/2017    Procedure: COLONOSCOPY;  Surgeon: Rashaad Bundy MD;  [facility]: [facility]     INSERT INTRAAORTIC BALLOON PUMP Right 4/19/2017    Procedure: INSERT INTRAAORTIC BALLOON PUMP;  Right Subclavian Intra Aortic Balloon Pump Insertion using Maquet 40cc Ballon Catheter, Implentation of 8mm Gelweave Woven Vascular Prosthesis, Removal of Left Femoral Ballon Pump Catheter, Flouroscopy;  Surgeon: Keshav Leung MD;  [facility]      PERCUTANEOUS MITRAL VALVE REPAIR N/A 5/1/2017    Procedure: PERCUTANEOUS MITRAL VALVE REPAIR ANESTHESIA;  Mitraclip Procedure Possible Cardiopulmonary Bypass ;  Surgeon: Ron Cortez MD;  [facility]     SUBCLAVIAN AORTIC VALVE IMPLANT N/A 5/8/2017    Procedure: SUBCLAVIAN AORTIC VALVE IMPLANT;  Right Subclavian Graft Removal ;  Surgeon: Keshav Leung MD;  [facility]     Current Outpatient Prescriptions   Medication Sig Dispense Refill     acetaminophen (TYLENOL) 325 MG tablet Take 2 tablets (650 mg) by mouth every 4 hours as needed for mild pain or fever 100 tablet 0     ascorbic acid 500 MG TABS Take 1 tablet (500 mg) by mouth daily 2 tablet 0     atorvastatin (LIPITOR) 40 MG tablet 1 tablet (40 mg) by Oral or Feeding Tube route daily 60 tablet 0     beta carotene 81619 UNIT capsule Take 1 capsule (25,000 Units) by mouth daily 2 capsule 0     bumetanide (BUMEX) 0.5 MG tablet Take 1 tablet (0.5 mg) by mouth 2 times daily 120 tablet 0     cetirizine (ZYRTEC) 10 MG tablet Take 1 tablet (10 mg) by mouth daily 60 tablet 0     clopidogrel (PLAVIX) 75 MG tablet Take 1 tablet (75 mg) by mouth daily 60 tablet 0     digoxin (LANOXIN) 125 MCG tablet Take 1 tablet (125 mcg) by mouth daily 60 tablet 0     finasteride (PROSCAR) 5 MG tablet Take 1 tablet (5 mg) by mouth daily 60 tablet 0     insulin glargine (LANTUS) 100 UNIT/ML injection Inject 25 Units Subcutaneous every morning (before breakfast) 9 mL 0     lisinopril (PRINIVIL/ZESTRIL) 2.5 MG tablet Take 1 tablet (2.5 mg) by mouth 2 times daily 120 tablet 0     pantoprazole (PROTONIX) 40 MG EC tablet Take 1 tablet (40 mg) by mouth daily 60 tablet 0     potassium chloride SA (K-DUR/KLOR-CON M) 20 MEQ CR tablet Take 1 tablet (20 mEq) by mouth 2 times daily 120 tablet 0     senna-docusate (SENOKOT-S;PERICOLACE) 8.6-50 MG per tablet Take 1 tablet by mouth 2 times daily Hold for loose stools 60 tablet 0     spironolactone (ALDACTONE) 25 MG tablet  Take 0.5 tablets (12.5 mg) by mouth daily 30 tablet 0     tamsulosin (FLOMAX) 0.4 MG capsule Take 1 capsule (0.4 mg) by mouth daily 60 capsule 0     warfarin (COUMADIN) 3 MG tablet Take 1 tablet (3 mg) by mouth daily 30 tablet 0     zinc sulfate (ZINCATE) 220 (50 ZN) MG capsule Take 1 capsule (220 mg) by mouth daily 2 capsule 0     blood glucose monitoring (NO BRAND SPECIFIED) test strip Use to test blood sugar 3 times daily or as directed. 100 strip 6     alcohol swab prep pads Use to swab area of injection/mary alice as directed 3 x per day 100 each 3     Sharps Container MISC 1 each every 30 days 1 each 0     insulin pen needle (BD KYLEE U/F) 32G X 4 MM Use 3 daily or as directed. 100 each prn     Warfarin Therapy Reminder 1 each continuous prn       ELOCON 0.1 % EX CREA apply to bug bites qd-bid prn 30g 0     No Known Allergies  Family History: There is no h/o  malignancy.  There is no h/o urolithiasis.     Social History: The patient does not smoke cigarettes (recently quit).  Denies EtOH and illicit drug use.      ROS: A comprehensive 14 point ROS was obtained and was  otherwise negative except for that outlined above in the HPI.    PHYSICAL EXAM:   There were no vitals filed for this visit.  GENERAL: Well groomed/well developed/well nourished male in NAD.  HEENT: EOMI, AT, NC.  SKIN: Warm to touch, dry.  No visible rashes or lesions.  RESP: No increased respiratory effort.  LYMPH: No LE edema.  ABD: Soft, NT, ND.  No CVAT.  MS: Full ROM in extremities.  NEURO: Alert and oriented x 3.  PSYCH: Normal mood and affect, pleasant and agreeable during interview and exam.    REVIEW OF OUTSIDE RECORDS: 15 minutes spent reviewing previous/outside records.    IMAGING: Reviewed.    PVR: 84cc    ASSESSMENT/PLAN:  Mr. Luke Henao is a pleasant 51 year old male who developed acute urinary retention following prolonged recovery from STEMI requiring Chavis catheter use. Has been taking tamsulosin and fenasteride for several weeks  with improvement back to his baseline.     I did review the PSA with the pt and Dr. Freeman today. Was  Mildly elevated during the acute issues. Plan to recheck in 3 months and f/u at that time with available urologist. Refills of Rxs sent to pharmacy.    Should the patient have voiding difficulty, the next appropriate studies would be cystoscopy and/or videourodynamics to better assess bladder function and return to office sooner than 3 months.    I have enjoyed participating in the medical care of this very pleasant patient.  Please don't hesitate to contact me with any questions or concerns.      Cherelle Meyers PA-C  [facility]    20 min spent with the patient, >50% of this time was spent in a face-to-face manner and on coordination of care.

## 2017-06-02 NOTE — MR AVS SNAPSHOT
After Visit Summary   6/2/2017    Luke Henao    MRN: [id number]           Patient Information     Date Of Birth          1965        Visit Information        Provider Department      6/2/2017 9:45 AM Cherelle Meyers PA-C; [employer] [facility]        Today's Diagnoses     Urinary retention    -  1       Follow-ups after your visit        Your next 10 appointments already scheduled     Jun 02, 2017 12:45 PM CDT   Anticoagulation Visit with [facility]   [facility] ([facility])    [address] [id number] [phone]            Jun 06, 2017  9:45 AM CDT   Office Visit with Glenn Brown MD   [facility] ([facility])    [address] [id number] [phone]           Bring a current list of meds and any records pertaining to this visit.  For Physicals, please bring immunization records and any forms needing to be filled out.  Please arrive 10 minutes early to complete paperwork.            Jun 06, 2017  2:45 PM CDT   Neuro Treatment with Allison E Alpers, SLP   [facility] ([facility])    [address] [place] [address]  [address]  [facility] [id number] [phone]            Jun 08, 2017  8:30 AM CDT   Lab with  LAB    [facility] ([facility])    [address] [place] [id number] [phone]            Jun 08, 2017  9:00 AM CDT   Ech Complete with [address] [facility] ([facility])    [address] [place] [id number] [phone]           1.  Please bring or wear a comfortable two-piece outfit. 2.  You may eat, drink and take your normal medicines. 3.  For any questions that cannot be answered, please contact the ordering physician            Ernie  08, 2017 10:00 AM CDT   (Arrive by 9:45 AM)   Return Mitral Valve with Ron Cortez MD   [facility] ([facility])    [address]  3rd Floor  [facility] [address]   [phone]            Jun 12, 2017  8:00 AM CDT   Neuro Treatment with Mariangel Hayward, SLP   [facility] ([facility])    [address]  [address]  [facility] [address]   [phone]            Jun 12, 2017  9:00 AM CDT   Neuro Eval with Lilian Mock, PT   [facility] ([facility])    [address]  [address]  [facility] [address]   [phone]            Jun 12, 2017 10:00 AM CDT   Neuro Treatment with Jeanne Anderson OT   [facility] ([facility])    [address]  [address]  [facility] [address]   [phone]            Jun 12, 2017  3:00 PM CDT   Cardiac Treatment with [facility] Re[facility] 1   [facility] ([facility])    [address]  [facility] [address]   [phone]              Who to contact     If you have questions or need follow up information about today's clinic visit or your schedule please contact [facility] [place] directly at [phone].  Normal or non-critical lab and imaging results will be communicated to you by C[employer] Callhart, letter or phone within 4 business days after the clinic has received the results. If you do not hear from us within 7 days, please contact the clinic through Prim[employer]t or phone. If you have a critical or abnormal lab result, we will notify you by phone as soon as possible.  Submit refill requests through WhoAPI or call your pharmacy and they will forward the refill request to us. Please allow 3 business days for your refill to be completed.          Additional Information About Your Visit        WhoAPI Information     WhoAPI lets you send messages to your  "doctor, view your test results, renew your prescriptions, schedule appointments and more. To sign up, go to [email or web address] . Click on \"Log in\" on the left side of the screen, which will take you to the Welcome page. Then click on \"Sign up Now\" on the right side of the page.     You will be asked to enter the access code listed below, as well as some personal information. Please follow the directions to create your username and password.     Your access code is: [email or web address]  Expires: 2017 12:15 PM     Your access code will  in 90 days. If you need help or a new code, please call your [facility] clinic or [phone].        Care EveryWhere ID     This is your Care EveryWhere ID. This could be used by other organizations to access your [facility] medical records  [id number]        Your Vitals Were     Height BMI (Body Mass Index)                1.575 m (5' 2\") 21.95 kg/m2           Blood Pressure from Last 3 Encounters:   17 104/60   17 104/72   17 107/78    Weight from Last 3 Encounters:   17 54.4 kg (120 lb)   17 53.5 kg (118 lb)   17 53.8 kg (118 lb 8 oz)              We Performed the Following     UA without Microscopic        Primary Care Provider    Physician No Ref-Primary       No address on file        Thank you!     Thank you for choosing [facility]  for your care. Our goal is always to provide you with excellent care. Hearing back from our patients is one way we can continue to improve our services. Please take a few minutes to complete the written survey that you may receive in the mail after your visit with us. Thank you!             Your Updated Medication List - Protect others around you: Learn how to safely use, store and throw away your medicines at www.disposemymeds.org.          This list is accurate as of: 17 10:24 AM.  Always use your most recent med list.                   Brand Name Dispense " Instructions for use    acetaminophen 325 MG tablet    TYLENOL    100 tablet    Take 2 tablets (650 mg) by mouth every 4 hours as needed for mild pain or fever       alcohol swab prep pads     100 each    Use to swab area of injection/mary alice as directed 3 x per day       ascorbic acid 500 MG Tabs     2 tablet    Take 1 tablet (500 mg) by mouth daily       atorvastatin 40 MG tablet    LIPITOR    60 tablet    1 tablet (40 mg) by Oral or Feeding Tube route daily       beta carotene 42282 UNIT capsule     2 capsule    Take 1 capsule (25,000 Units) by mouth daily       blood glucose monitoring test strip    no brand specified    100 strip    Use to test blood sugar 3 times daily or as directed.       bumetanide 0.5 MG tablet    BUMEX    120 tablet    Take 1 tablet (0.5 mg) by mouth 2 times daily       cetirizine 10 MG tablet    zyrTEC    60 tablet    Take 1 tablet (10 mg) by mouth daily       clopidogrel 75 MG tablet    PLAVIX    60 tablet    Take 1 tablet (75 mg) by mouth daily       digoxin 125 MCG tablet    LANOXIN    60 tablet    Take 1 tablet (125 mcg) by mouth daily       finasteride 5 MG tablet    PROSCAR    60 tablet    Take 1 tablet (5 mg) by mouth daily       insulin glargine 100 UNIT/ML injection    LANTUS    9 mL    Inject 25 Units Subcutaneous every morning (before breakfast)       insulin pen needle 32G X 4 MM    BD KYLEE U/F    100 each    Use 3 daily or as directed.       lisinopril 2.5 MG tablet    PRINIVIL/Zestril    120 tablet    Take 1 tablet (2.5 mg) by mouth 2 times daily       pantoprazole 40 MG EC tablet    PROTONIX    60 tablet    Take 1 tablet (40 mg) by mouth daily       potassium chloride SA 20 MEQ CR tablet    K-DUR/KLOR-CON M    120 tablet    Take 1 tablet (20 mEq) by mouth 2 times daily       Sharps Container Misc     1 each    1 each every 30 days       spironolactone 25 MG tablet    ALDACTONE    30 tablet    Take 0.5 tablets (12.5 mg) by mouth daily       tamsulosin 0.4 MG capsule    FLOMAX     60 capsule    Take 1 capsule (0.4 mg) by mouth daily       * Warfarin Therapy Reminder      1 each continuous prn       * warfarin 3 MG tablet    COUMADIN    30 tablet    Take 1 tablet (3 mg) by mouth daily       zinc sulfate 220 (50 ZN) MG capsule    ZINCATE    2 capsule    Take 1 capsule (220 mg) by mouth daily       * Notice:  This list has 2 medication(s) that are the same as other medications prescribed for you. Read the directions carefully, and ask your doctor or other care provider to review them with you.

## 2017-06-02 NOTE — PROGRESS NOTES
CC: Urinary retention during prolonged hospitalization.    HPI: It is a pleasure to see Mr. Luke Henao, a very pleasant 51 year old male seen today in the urology clinic in consultation from Dr. Swift for evaluation of urinary retention. This developed acutely following prolonged recovery from cardiogenic shock from inferior STEMI requiring RCA thrombectomy, temp pacer and intubation.  This occurred on 3/27/17. He was recently discharged from Acute Rehab on 5/12/17. During the hospitalization, he required prolonged Chavis catheter and subsequent intermittent cathetheterization.  Upon discharge, he continued Flomax and finasteride.  A PSA was checked during the hospitalization on 4/15/17 and was 4.5.     The patient has no prior history of AUR or similar symptoms. Currently denies fevers, chills, N/V, abdominal/back pain. Feels that he is overall doing well from a voiding standpoint and is happy to be back home.    Visit performed with the help of an .    Past Medical History:   Diagnosis Date     History of thrombophlebitis    STEMI  Mitral regurg  Ischemic Stroke  CAD  DM  DVT      Past Surgical History:   Procedure Laterality Date     COLONOSCOPY N/A 4/17/2017    Procedure: COLONOSCOPY;  Surgeon: Rashaad Bundy MD;  [facility]     INSERT INTRAAORTIC BALLOON PUMP Right 4/19/2017    Procedure: INSERT INTRAAORTIC BALLOON PUMP;  Right Subclavian Intra Aortic Balloon Pump Insertion using Maquet 40cc Ballon Catheter, Implentation of 8mm Gelweave Woven Vascular Prosthesis, Removal of Left Femoral Ballon Pump Catheter, Flouroscopy;  Surgeon: Keshav Leung MD;  [facility]     PERCUTANEOUS MITRAL VALVE REPAIR N/A 5/1/2017    Procedure: PERCUTANEOUS MITRAL VALVE REPAIR ANESTHESIA;  Mitraclip Procedure Possible Cardiopulmonary Bypass ;  Surgeon: Ron Cortze MD;  [facility]     SUBCLAVIAN AORTIC VALVE IMPLANT N/A 5/8/2017    Procedure: SUBCLAVIAN AORTIC VALVE IMPLANT;   Right Subclavian Graft Removal ;  Surgeon: Keshav Leung MD;  [facility] OR     Current Outpatient Prescriptions   Medication Sig Dispense Refill     acetaminophen (TYLENOL) 325 MG tablet Take 2 tablets (650 mg) by mouth every 4 hours as needed for mild pain or fever 100 tablet 0     ascorbic acid 500 MG TABS Take 1 tablet (500 mg) by mouth daily 2 tablet 0     atorvastatin (LIPITOR) 40 MG tablet 1 tablet (40 mg) by Oral or Feeding Tube route daily 60 tablet 0     beta carotene 66996 UNIT capsule Take 1 capsule (25,000 Units) by mouth daily 2 capsule 0     bumetanide (BUMEX) 0.5 MG tablet Take 1 tablet (0.5 mg) by mouth 2 times daily 120 tablet 0     cetirizine (ZYRTEC) 10 MG tablet Take 1 tablet (10 mg) by mouth daily 60 tablet 0     clopidogrel (PLAVIX) 75 MG tablet Take 1 tablet (75 mg) by mouth daily 60 tablet 0     digoxin (LANOXIN) 125 MCG tablet Take 1 tablet (125 mcg) by mouth daily 60 tablet 0     finasteride (PROSCAR) 5 MG tablet Take 1 tablet (5 mg) by mouth daily 60 tablet 0     insulin glargine (LANTUS) 100 UNIT/ML injection Inject 25 Units Subcutaneous every morning (before breakfast) 9 mL 0     lisinopril (PRINIVIL/ZESTRIL) 2.5 MG tablet Take 1 tablet (2.5 mg) by mouth 2 times daily 120 tablet 0     pantoprazole (PROTONIX) 40 MG EC tablet Take 1 tablet (40 mg) by mouth daily 60 tablet 0     potassium chloride SA (K-DUR/KLOR-CON M) 20 MEQ CR tablet Take 1 tablet (20 mEq) by mouth 2 times daily 120 tablet 0     senna-docusate (SENOKOT-S;PERICOLACE) 8.6-50 MG per tablet Take 1 tablet by mouth 2 times daily Hold for loose stools 60 tablet 0     spironolactone (ALDACTONE) 25 MG tablet Take 0.5 tablets (12.5 mg) by mouth daily 30 tablet 0     tamsulosin (FLOMAX) 0.4 MG capsule Take 1 capsule (0.4 mg) by mouth daily 60 capsule 0     warfarin (COUMADIN) 3 MG tablet Take 1 tablet (3 mg) by mouth daily 30 tablet 0     zinc sulfate (ZINCATE) 220 (50 ZN) MG capsule Take 1 capsule (220 mg) by mouth  daily 2 capsule 0     blood glucose monitoring (NO BRAND SPECIFIED) test strip Use to test blood sugar 3 times daily or as directed. 100 strip 6     alcohol swab prep pads Use to swab area of injection/mary alice as directed 3 x per day 100 each 3     Sharps Container MISC 1 each every 30 days 1 each 0     insulin pen needle (BD KYLEE U/F) 32G X 4 MM Use 3 daily or as directed. 100 each prn     Warfarin Therapy Reminder 1 each continuous prn       ELOCON 0.1 % EX CREA apply to bug bites qd-bid prn 30g 0     No Known Allergies  Family History: There is no h/o  malignancy.  There is no h/o urolithiasis.     Social History: The patient does not smoke cigarettes (recently quit).  Denies EtOH and illicit drug use.      ROS: A comprehensive 14 point ROS was obtained and was  otherwise negative except for that outlined above in the HPI.    PHYSICAL EXAM:   There were no vitals filed for this visit.  GENERAL: Well groomed/well developed/well nourished male in NAD.  HEENT: EOMI, AT, NC.  SKIN: Warm to touch, dry.  No visible rashes or lesions.  RESP: No increased respiratory effort.  LYMPH: No LE edema.  ABD: Soft, NT, ND.  No CVAT.  MS: Full ROM in extremities.  NEURO: Alert and oriented x 3.  PSYCH: Normal mood and affect, pleasant and agreeable during interview and exam.    REVIEW OF OUTSIDE RECORDS: 15 minutes spent reviewing previous/outside records.    IMAGING: Reviewed.    PVR: 84cc    ASSESSMENT/PLAN:  Mr. Luke Henao is a pleasant 51 year old male who developed acute urinary retention following prolonged recovery from STEMI requiring Chavis catheter use. Has been taking tamsulosin and fenasteride for several weeks with improvement back to his baseline.     I did review the PSA with the pt and Dr. Freeman today. Was  Mildly elevated during the acute issues. Plan to recheck in 3 months and f/u at that time with available urologist. Refills of Rxs sent to pharmacy.    Should the patient have voiding difficulty, the next  appropriate studies would be cystoscopy and/or videourodynamics to better assess bladder function and return to office sooner than 3 months.    I have enjoyed participating in the medical care of this very pleasant patient.  Please don't hesitate to contact me with any questions or concerns.      Cherelle Meyers PA-C  [facility]    20 min spent with the patient, >50% of this time was spent in a face-to-face manner and on coordination of care.

## 2017-06-02 NOTE — MR AVS SNAPSHOT
Luke Henao   6/2/2017 1:00 PM   Anticoagulation Therapy Visit    Description:  51 year old male   Provider:   ANTICOAGULATION CLINIC   Department:   Anti Coagulation           INR as of 6/2/2017     Today's INR 3.2!      Anticoagulation Summary as of 6/2/2017     INR goal 2.0-3.0   Today's INR 3.2!   Full instructions 6/2: 3 mg; 6/3: 1.5 mg; 6/4: 3 mg; Otherwise No maintenance plan   Next INR check 6/6/2017    Indications   Stroke (H) [I63.9]         Your next Anticoagulation Clinic appointment(s)     Jun 05, 2017  8:45 AM CDT   Anticoagulation Visit with  ANTICO[facility]ULATION CLINIC, [facility]   [facility]    [address] [address] [phone]              Contact Numbers     [facility]  Please call  [phone] to cancel and/or reschedule your appointment   Please call  [phone] with any problems or questions regarding your therapy.        June 2017 Details    Sun Mon Tue Wed Thu Fri Sat         1               2      3 mg   See details      3      1.5 mg           4      3 mg         5               6            7               8               9               10                 11               12               13               14               15               16               17                 18               19               20               21               22               23               24                 25               26               27               28               29               30                 Date Details   06/02 This INR check       Date of next INR:  6/6/2017         How to take your warfarin dose     To take:  1.5 mg Take 0.5 of a 3 mg tablet.    To take:  3 mg Take 1 of the 3 mg tablets.

## 2017-06-02 NOTE — PROGRESS NOTES
ANTICOAGULATION FOLLOW-UP CLINIC VISIT    Patient Name:  Luke Henao  Date:  6/2/2017  Contact Type:  Face to Face    SUBJECTIVE:        OBJECTIVE    INR Protime   Date Value Ref Range Status   06/02/2017 3.2 (A) 0.86 - 1.14 Final       ASSESSMENT / PLAN  INR assessment SUPRA    Recheck INR In: 3 DAYS    INR Location Clinic  with pt     Anticoagulation Summary as of 6/2/2017     INR goal 2.0-3.0   Today's INR 3.2!   Maintenance plan No maintenance plan   Full instructions 6/2: 3 mg; 6/3: 1.5 mg; 6/4: 3 mg; Otherwise No maintenance plan   Next INR check 6/6/2017   Target end date     Indications   Stroke (H) [I63.9]         Anticoagulation Episode Summary     INR check location     Preferred lab     Send INR reminders to  [facility]    Comments             See the Encounter Report to view Anticoagulation Flowsheet and Dosing Calendar (Go to Encounters tab in chart review, and find the Anticoagulation Therapy Visit)        Velvet Jones RN

## 2017-06-05 ENCOUNTER — ANTICOAGULATION THERAPY VISIT (OUTPATIENT)
Dept: ANTICOAGULATION | Facility: CLINIC | Age: 52
End: 2017-06-05
Payer: COMMERCIAL

## 2017-06-05 DIAGNOSIS — I63.40 CEREBROVASCULAR ACCIDENT (CVA) DUE TO EMBOLISM OF CEREBRAL ARTERY (H): ICD-10-CM

## 2017-06-05 LAB — INR POINT OF CARE: 1.8 (ref 0.86–1.14)

## 2017-06-05 PROCEDURE — 36416 COLLJ CAPILLARY BLOOD SPEC: CPT

## 2017-06-05 PROCEDURE — 85610 PROTHROMBIN TIME: CPT | Mod: QW

## 2017-06-05 NOTE — MR AVS SNAPSHOT
Luke Henao   6/5/2017 8:45 AM   Anticoagulation Therapy Visit    Description:  51 year old male   Provider:  [facility];  ANTICO[facility]ULATION CLINIC   Department:   Anti Coagulation           INR as of 6/5/2017     Today's INR 1.8!      Anticoagulation Summary as of 6/5/2017     INR goal 2.0-3.0   Today's INR 1.8!   Full instructions 6/5: 3 mg; 6/6: 3 mg; 6/7: 1.5 mg; 6/8: 3 mg; Otherwise No maintenance plan   Next INR check 6/9/2017    Indications   Stroke (H) [I63.9]         Your next Anticoagulation Clinic appointment(s)     Jun 09, 2017  8:30 AM CDT   Anticoagulation Visit with  ANTICOAGULATION CLINIC   [facility] ([facility]    [address] [address] [phone]              Contact Numbers     [facility]  Please call  [phone] to cancel and/or reschedule your appointment   Please call  [phone] with any problems or questions regarding your therapy.        June 2017 Details    Sun Mon Tue Wed Thu Fri Sat         1               2               3                 4               5      3 mg   See details      6      3 mg         7      1.5 mg         8      3 mg         9            10                 11               12               13               14               15               16               17                 18               19               20               21               22               23               24                 25               26               27               28               29               30                 Date Details   06/05 This INR check       Date of next INR:  6/9/2017         How to take your warfarin dose     To take:  1.5 mg Take 0.5 of a 3 mg tablet.    To take:  3 mg Take 1 of the 3 mg tablets.

## 2017-06-05 NOTE — PROGRESS NOTES
ANTICOAGULATION FOLLOW-UP CLINIC VISIT    Patient Name:  Luke Henao  Date:  6/5/2017  Contact Type:  Face to Face    SUBJECTIVE:        OBJECTIVE    INR Protime   Date Value Ref Range Status   06/05/2017 1.8 (A) 0.86 - 1.14 Final       ASSESSMENT / PLAN  INR assessment THER    Recheck INR In: 4 DAYS    INR Location Clinic      Anticoagulation Summary as of 6/5/2017     INR goal 2.0-3.0   Today's INR 1.8!   Maintenance plan No maintenance plan   Full instructions 6/5: 3 mg; 6/6: 3 mg; 6/7: 1.5 mg; 6/8: 3 mg; Otherwise No maintenance plan   Next INR check 6/9/2017   Target end date     Indications   Stroke (H) [I63.9]         Anticoagulation Episode Summary     INR check location     Preferred lab     Send INR reminders to  [facility]    Comments             See the Encounter Report to view Anticoagulation Flowsheet and Dosing Calendar (Go to Encounters tab in chart review, and find the Anticoagulation Therapy Visit)    Dosage adjustment made based on physician directed care plan.    Pina Coyle RN

## 2017-06-06 ENCOUNTER — PRE VISIT (OUTPATIENT)
Dept: CARDIOLOGY | Facility: CLINIC | Age: 52
End: 2017-06-06

## 2017-06-06 ENCOUNTER — OFFICE VISIT (OUTPATIENT)
Dept: INTERNAL MEDICINE | Facility: CLINIC | Age: 52
End: 2017-06-06
Payer: COMMERCIAL

## 2017-06-06 VITALS
SYSTOLIC BLOOD PRESSURE: 100 MMHG | BODY MASS INDEX: 22.68 KG/M2 | WEIGHT: 120.1 LBS | HEIGHT: 61 IN | OXYGEN SATURATION: 99 % | HEART RATE: 106 BPM | TEMPERATURE: 98.1 F | DIASTOLIC BLOOD PRESSURE: 58 MMHG

## 2017-06-06 DIAGNOSIS — Z09 HOSPITAL DISCHARGE FOLLOW-UP: Primary | ICD-10-CM

## 2017-06-06 DIAGNOSIS — I63.30 CEREBROVASCULAR ACCIDENT (CVA) DUE TO THROMBOSIS OF CEREBRAL ARTERY (H): ICD-10-CM

## 2017-06-06 DIAGNOSIS — I21.3 ST ELEVATION MYOCARDIAL INFARCTION (STEMI), UNSPECIFIED ARTERY (H): ICD-10-CM

## 2017-06-06 DIAGNOSIS — E78.5 HYPERLIPIDEMIA LDL GOAL <70: ICD-10-CM

## 2017-06-06 DIAGNOSIS — Z79.4 TYPE 2 DIABETES MELLITUS WITHOUT COMPLICATION, WITH LONG-TERM CURRENT USE OF INSULIN (H): ICD-10-CM

## 2017-06-06 DIAGNOSIS — R57.0 CARDIOGENIC SHOCK (H): ICD-10-CM

## 2017-06-06 DIAGNOSIS — I82.629 DEEP VEIN THROMBOSIS (DVT) OF OTHER VEIN OF UPPER EXTREMITY, UNSPECIFIED CHRONICITY, UNSPECIFIED LATERALITY (H): ICD-10-CM

## 2017-06-06 DIAGNOSIS — E11.9 TYPE 2 DIABETES MELLITUS WITHOUT COMPLICATION, WITH LONG-TERM CURRENT USE OF INSULIN (H): ICD-10-CM

## 2017-06-06 DIAGNOSIS — I34.0 MITRAL VALVE INSUFFICIENCY, UNSPECIFIED ETIOLOGY: Primary | ICD-10-CM

## 2017-06-06 LAB
ANION GAP SERPL CALCULATED.3IONS-SCNC: 7 MMOL/L (ref 3–14)
BUN SERPL-MCNC: 15 MG/DL (ref 7–30)
CALCIUM SERPL-MCNC: 8.9 MG/DL (ref 8.5–10.1)
CHLORIDE SERPL-SCNC: 104 MMOL/L (ref 94–109)
CO2 SERPL-SCNC: 28 MMOL/L (ref 20–32)
CREAT SERPL-MCNC: 0.93 MG/DL (ref 0.66–1.25)
GFR SERPL CREATININE-BSD FRML MDRD: 86 ML/MIN/1.7M2
GLUCOSE SERPL-MCNC: 122 MG/DL (ref 70–99)
HBA1C MFR BLD: 5.7 % (ref 4.3–6)
HGB BLD-MCNC: 12.3 G/DL (ref 13.3–17.7)
POTASSIUM SERPL-SCNC: 4.6 MMOL/L (ref 3.4–5.3)
SODIUM SERPL-SCNC: 139 MMOL/L (ref 133–144)

## 2017-06-06 PROCEDURE — 83036 HEMOGLOBIN GLYCOSYLATED A1C: CPT | Performed by: INTERNAL MEDICINE

## 2017-06-06 PROCEDURE — 36415 COLL VENOUS BLD VENIPUNCTURE: CPT | Performed by: INTERNAL MEDICINE

## 2017-06-06 PROCEDURE — 99204 OFFICE O/P NEW MOD 45 MIN: CPT | Performed by: INTERNAL MEDICINE

## 2017-06-06 PROCEDURE — 80048 BASIC METABOLIC PNL TOTAL CA: CPT | Performed by: INTERNAL MEDICINE

## 2017-06-06 PROCEDURE — 85018 HEMOGLOBIN: CPT | Performed by: INTERNAL MEDICINE

## 2017-06-06 NOTE — NURSING NOTE
"Chief Complaint   Patient presents with     [facility] F/U       Initial /58  Pulse 106  Temp 98.1  F (36.7  C) (Oral)  Ht 5' 1\" (1.549 m)  Wt 120 lb 1.6 oz (54.5 kg)  SpO2 99%  BMI 22.69 kg/m2 Estimated body mass index is 22.69 kg/(m^2) as calculated from the following:    Height as of this encounter: 5' 1\" (1.549 m).    Weight as of this encounter: 120 lb 1.6 oz (54.5 kg).  Medication Reconciliation: complete   Anabell Mccormick CMA      "

## 2017-06-06 NOTE — LETTER
[facility]  [address]  [address]      June 6, 2017      Luke Henao  [address]  [facility] [address]          Dear Luke,      I have enclosed a copy of your most recent labs done here at the clinic and if available some of your prior labs for comparison.     Your basic panel is stable.    Your Hemoglobin A1C and blood sugar tests look good and I would continue with your medication without change.  These tests should be repeated in 6 months.    Your hemoglobin function test is slightly abnormal but stable and should be rechecked here in the clinic in 3 months with a follow-up visit with me.  I will look forward to seeing you at that time and please call to make an appointment.    Please call me if you have further questions.        Glenn Brown MD

## 2017-06-06 NOTE — PROGRESS NOTES
SUBJECTIVE:                                                    Luke Henao is a 51 year old male who presents to clinic today for the following health issues:    The patient does not speak English as their primary Language. The patient was seen today via an interpretor.    New Patient/Transfer of Care- no routine care in many yrs       Hospital Follow-up Visit:    Hospital/Nursing Home/IP Rehab Facility: [facility]  Date of Admission: 5/12/17  Date of Discharge: 5/26/17  Reason(s) for Admission: Ischemic cardiomyopathy, STEMI, cardiogenic shock, CVA, impaired cognition, DVT            Problems taking medications regularly:  None       Medication changes since discharge: None       Problems adhering to non-medication therapy:  None    Summary of hospitalization:  [facility] discharge summary reviewed  Diagnostic Tests/Treatments reviewed.  Follow up needed: Cardiology, Neurology  Other Healthcare Providers Involved in Patient s Care:         Physical Therapy  Update since discharge: stable.     Post Discharge Medication Reconciliation: discharge medications reconciled, continue medications without change.  Plan of care communicated with patient     Coding guidelines for this visit:  Type of Medical   Decision Making Face-to-Face Visit       within 7 Days of discharge Face-to-Face Visit        within 14 days of discharge   Moderate Complexity [id number]   High Complexity [id number]            DISCHARGE DIAGNOSES:   1.  Myocardial infarction with cardiogenic shock, ischemic cardiomyopathy requiring intraaortic balloon pump and Karena clip for ischemic mitral regurgitation.   2.  Bilateral hemisphere ischemic stroke, left side predominant weakness.   3.  Impaired mobility, activities of daily living and cognition related to above.   4.  Diabetes mellitus.   5.  Urine retention.   6.  Stage III coccygeal ulcer.   7.  Right upper chest open wounds still from recent cardiac and arterial  access.   8.  Deep vein thrombosis, left internal jugular and left femoral veins, on warfarin.   9.  Tobacco dependence, abstaining.   10.  Hyperlipidemia.   11.  Gastrointestinal bleed in acute hospital, none during acute rehabilitation.     Problem list and histories reviewed & adjusted, as indicated.  Additional history: as documented    Patient Active Problem List   Diagnosis     STEMI (ST elevation myocardial infarction) (H)     Acute MI (H)     Cardiogenic shock (H)     Mitral regurgitation     Stroke (H)     Impaired cognition     Cognitive deficits as late effect of cerebrovascular disease     Type 2 diabetes mellitus without complication, with long-term current use of insulin (H)     Hyperlipidemia LDL goal <70     Cerebrovascular accident (CVA) due to thrombosis of cerebral artery (H)     Past Surgical History:   Procedure Laterality Date     COLONOSCOPY N/A 4/17/2017    Procedure: COLONOSCOPY;  Surgeon: aRshaad Bundy MD;  Location:  GI     INSERT INTRAAORTIC BALLOON PUMP Right 4/19/2017    Procedure: INSERT INTRAAORTIC BALLOON PUMP;  Right Subclavian Intra Aortic Balloon Pump Insertion using Maquet 40cc Ballon Catheter, Implentation of 8mm Gelweave Woven Vascular Prosthesis, Removal of Left Femoral Ballon Pump Catheter, Flouroscopy;  Surgeon: Keshav Leung MD;  Location:  OR     PERCUTANEOUS MITRAL VALVE REPAIR N/A 5/1/2017    Procedure: PERCUTANEOUS MITRAL VALVE REPAIR ANESTHESIA;  Mitraclip Procedure Possible Cardiopulmonary Bypass ;  Surgeon: Ron Cortez MD;  Location:  OR     SUBCLAVIAN AORTIC VALVE IMPLANT N/A 5/8/2017    Procedure: SUBCLAVIAN AORTIC VALVE IMPLANT;  Right Subclavian Graft Removal ;  Surgeon: Keshav Leung MD;  Location:  OR       Social History   Substance Use Topics     Smoking status: Former Smoker     Packs/day: 0.50     Types: Cigarettes     Smokeless tobacco: Not on file     Alcohol use No     History reviewed. No pertinent family  history.      Current Outpatient Prescriptions   Medication Sig Dispense Refill     aspirin 81 MG EC tablet Take 1 tablet (81 mg) by mouth daily 90 tablet 3     ASPIRIN NOT PRESCRIBED (INTENTIONAL) Please choose reason not prescribed, below 0 each 0     acetaminophen (TYLENOL) 325 MG tablet Take 2 tablets (650 mg) by mouth every 4 hours as needed for mild pain or fever 100 tablet 0     ascorbic acid 500 MG TABS Take 1 tablet (500 mg) by mouth daily 2 tablet 0     atorvastatin (LIPITOR) 40 MG tablet 1 tablet (40 mg) by Oral or Feeding Tube route daily 60 tablet 0     beta carotene 23025 UNIT capsule Take 1 capsule (25,000 Units) by mouth daily 2 capsule 0     bumetanide (BUMEX) 0.5 MG tablet Take 1 tablet (0.5 mg) by mouth 2 times daily 120 tablet 0     cetirizine (ZYRTEC) 10 MG tablet Take 1 tablet (10 mg) by mouth daily 60 tablet 0     clopidogrel (PLAVIX) 75 MG tablet Take 1 tablet (75 mg) by mouth daily 60 tablet 0     digoxin (LANOXIN) 125 MCG tablet Take 1 tablet (125 mcg) by mouth daily 60 tablet 0     finasteride (PROSCAR) 5 MG tablet Take 1 tablet (5 mg) by mouth daily 60 tablet 0     insulin glargine (LANTUS) 100 UNIT/ML injection Inject 25 Units Subcutaneous every morning (before breakfast) 9 mL 0     lisinopril (PRINIVIL/ZESTRIL) 2.5 MG tablet Take 1 tablet (2.5 mg) by mouth 2 times daily 120 tablet 0     pantoprazole (PROTONIX) 40 MG EC tablet Take 1 tablet (40 mg) by mouth daily 60 tablet 0     potassium chloride SA (K-DUR/KLOR-CON M) 20 MEQ CR tablet Take 1 tablet (20 mEq) by mouth 2 times daily 120 tablet 0     spironolactone (ALDACTONE) 25 MG tablet Take 0.5 tablets (12.5 mg) by mouth daily 30 tablet 0     tamsulosin (FLOMAX) 0.4 MG capsule Take 1 capsule (0.4 mg) by mouth daily 60 capsule 0     warfarin (COUMADIN) 3 MG tablet Take 1 tablet (3 mg) by mouth daily 30 tablet 0     zinc sulfate (ZINCATE) 220 (50 ZN) MG capsule Take 1 capsule (220 mg) by mouth daily 2 capsule 0     blood glucose  monitoring (NO BRAND SPECIFIED) test strip Use to test blood sugar 3 times daily or as directed. 100 strip 6     alcohol swab prep pads Use to swab area of injection/mary alice as directed 3 x per day 100 each 3     Sharps Container MISC 1 each every 30 days 1 each 0     insulin pen needle (BD KYLEE U/F) 32G X 4 MM Use 3 daily or as directed. 100 each prn     Warfarin Therapy Reminder 1 each continuous prn       No Known Allergies  Recent Labs   Lab Test  05/26/17   0553  05/24/17   0531   05/22/17   0827   05/08/17   0404   05/07/17   0357   05/06/17   0341   05/04/17   0359   04/14/17   1751   04/08/17   0425   03/27/17   0551   A1C   --    --    --    --    --    --    --    --    --    --    --   Below Assay Range   Canceled, Test credited  EMILIE LAW RN @ 1014 5.4.17 KLA     --    --    --   7.1*   --   7.5*   LDL   --    --    --    --    --    --    --    --    --    --    --    --    --    --    --    --    --   77   HDL   --    --    --    --    --    --    --    --    --    --    --    --    --    --    --    --    --   36*   TRIG   --    --    --    --    --    --    --    --    --    --    --    --    --    --    --    --    --   76   ALT   --    --    --    --    --   30   --   28   --   30   < >  24   < >   --    < >  215*   < >  2339*   CR  0.78   --    --   0.70   < >  0.77   < >  0.79   < >  0.88   < >  0.90   < >  1.39*   < >  2.84*   < >  1.42*   GFRESTIMATED  >90  Non  GFR Calc     --    --   >90  Non  GFR Calc     < >  >90  Non  GFR Calc     < >  >90  Non  GFR Calc     < >  >90  Non  GFR Calc     < >  89   < >  54*   < >  24*   < >  53*   GFRESTBLACK  >90   GFR Calc     --    --   >90   GFR Calc     < >  >90   GFR Calc     < >  >90   GFR Calc     < >  >90   GFR Calc     < >  >90   GFR Calc     < >  66   < >  29*   < >  64  "  POTASSIUM  3.9  3.7   < >  3.3*   < >  3.9   < >  4.3   < >  4.2   < >  4.2   < >  3.7   < >  4.0   < >  3.9   TSH   --    --    --    --    --    --    --    --    --    --    --    --    --   2.54   --   1.33   --    --     < > = values in this interval not displayed.      BP Readings from Last 3 Encounters:   06/06/17 100/58   06/02/17 104/60   05/26/17 104/72    Wt Readings from Last 3 Encounters:   06/06/17 120 lb 1.6 oz (54.5 kg)   06/02/17 120 lb (54.4 kg)   05/31/17 118 lb (53.5 kg)                  Labs reviewed in EPIC    Reviewed and updated as needed this visit by clinical staff  Tobacco  Allergies  Problems  Med Hx  Surg Hx  Fam Hx  Soc Hx      Reviewed and updated as needed this visit by Provider         ROS:  C: NEGATIVE for fever, chills, change in weight  E/M: NEGATIVE for ear, mouth and throat problems  R: NEGATIVE for significant cough or SOB  CV: NEGATIVE for chest pain, palpitations or peripheral edema  GI: NEGATIVE for nausea, abdominal pain, heartburn, or change in bowel habits  : NEGATIVE for frequency, dysuria, or hematuria  M: NEGATIVE for significant arthralgias or myalgia  H: NEGATIVE for bleeding problems  P: NEGATIVE for changes in mood or affect    OBJECTIVE:                                                    /58  Pulse 106  Temp 98.1  F (36.7  C) (Oral)  Ht 5' 1\" (1.549 m)  Wt 120 lb 1.6 oz (54.5 kg)  SpO2 99%  BMI 22.69 kg/m2  Body mass index is 22.69 kg/(m^2).  GENERAL: alert and no distress  EYES: Eyes grossly normal to inspection, extraocular movements - intact, and PERRL  HENT: ear canals- normal; TMs- normal; Nose- normal; Mouth- no ulcers, no lesions  NECK: no tenderness, no adenopathy, no asymmetry, no masses, no stiffness; thyroid- normal to palpation  RESP: lungs clear to auscultation - no rales, no rhonchi, no wheezes  CV: regular rates and rhythm, normal S1 S2, no S3 or S4 and no click or rub   MS: using walker  NEURO: mild generalized weakness " LE's  PSYCH: Alert and oriented times 3; speech- coherent , normal rate and volume; able to articulate logical thoughts, able to abstract reason, no tangential thoughts, no hallucinations or delusions, affect- normal       ASSESSMENT/PLAN:                                                    (Z09) Hospital discharge follow-up  (primary encounter diagnosis)  Comment: appears stable  Plan: Hemoglobin, Basic metabolic panel            (R57.0) Cardiogenic shock (H)  Comment: resolving on therapy  Plan: aspirin 81 MG EC tablet, ASPIRIN NOT PRESCRIBED        (INTENTIONAL)            (I21.3) ST elevation myocardial infarction (STEMI), unspecified artery (H)  Comment: f/u Cardiology as scheduled  Plan:     (I82.629) Deep vein thrombosis (DVT) of other vein of upper extremity, unspecified chronicity, unspecified laterality (H)  Comment: coumadin as ordered, suspect 3-6 months therapy  Plan:     (I63.30) Cerebrovascular accident (CVA) due to thrombosis of cerebral artery (H)  Comment: resolving with PT/OT as ordered  Plan:     (E11.9,  Z79.4) Type 2 diabetes mellitus without complication, with long-term current use of insulin (H)  Comment: poorly controlled, suspect factor in above  Plan: Hemoglobin A1c, aspirin 81 MG EC tablet,         ASPIRIN NOT PRESCRIBED (INTENTIONAL)            (E78.5) Hyperlipidemia LDL goal <70  Comment: advised recheck 8 weeks on therapy, goal LDL noted  Plan:     See Patient Instructions    Glenn Brown MD  [facility]

## 2017-06-06 NOTE — MR AVS SNAPSHOT
After Visit Summary   6/6/2017    Luke Henao    MRN: [id number]           Patient Information     Date Of Birth          1965        Visit Information        Provider Department      6/6/2017 9:45 AM Glenn Brown MD; [employer] [facility]        Today's Diagnoses     Hospital discharge follow-up    -  1    Cardiogenic shock (H)        ST elevation myocardial infarction (STEMI), unspecified artery (H)        Deep vein thrombosis (DVT) of other vein of upper extremity, unspecified chronicity, unspecified laterality (H)        Cerebrovascular accident (CVA) due to thrombosis of cerebral artery (H)        Type 2 diabetes mellitus without complication, with long-term current use of insulin (H)        Hyperlipidemia LDL goal <70           Follow-ups after your visit        Follow-up notes from your care team     Return in about 6 weeks (around 7/18/2017) for BP Recheck, Lab Work.      Your next 10 appointments already scheduled     Jun 06, 2017  2:45 PM CDT   Neuro Treatment with Mariangel Hayward, SLP   [facility] Speech Therapy ([facility])    [address] [place] [address]   [phone]            Jun 08, 2017  8:30 AM CDT   Lab with UC LAB    Health Lab ([facility])    [address] [place] [id number] [phone]            Jun 08, 2017  9:00 AM CDT   Ech Complete with UCECHCR2    Health Echo ([facility])    [address] [place] [id number] [phone]           1.  Please bring or wear a comfortable two-piece outfit. 2.  You may eat, drink and take your normal medicines. 3.  For any questions that cannot be answered, please contact the ordering physician            Jun 08, 2017 10:00 AM CDT   (Arrive by 9:45 AM)   Return Mitral Valve with Ron Cortez MD   [facility]  and [facility] [place])    [address]  3rd Floor  [facility] [address]   [phone]            Jun 09, 2017  8:30 AM CDT   Anticoagulation Visit with [facility] ANTICOAGULATION CLINIC   [facility] ([facility])    [address]  [facility] [id number] [phone]            Jun 12, 2017  8:00 AM CDT   Neuro Treatment with Mariangel Hayward, SLP   [facility] Speech Therapy ([facility])    [address] [place] [address]  [address]  [facility] [address]   [phone]            Jun 12, 2017  9:00 AM CDT   Neuro Eval with Lilian Mock, PT   [facility] Physical Therapy ([facility])    [address] [place] [address]  [address]  [facility] [address]   [phone]            Jun 12, 2017 10:00 AM CDT   Neuro Treatment with Jeanne Anderson OT   [facility] Occupational Therapy ([facility])    [address] [place] [address]  [address]  [facility] [address]   [phone]            Jun 12, 2017  3:00 PM CDT   Cardiac Treatment with  Cardiac Rehab 1   [facility] Cardiac Rehab ([facility])    [address]  [facility] [address]   [phone]            Jun 16, 2017  3:00 PM CDT   Cardiac Treatment with  Cardiac Rehab 1   [facility] Cardiac Rehab ([facility])    [address]  [facility] [address]   [phone]              Who to contact     If you have questions or need follow up information about today's clinic visit or your schedule please contact [facility] directly at [phone].  Normal or non-critical lab and imaging results will be communicated to you by MyChart, letter or phone within 4 business days after the clinic has received the results. If you do not hear from us within 7 days, please contact the clinic through MyChart or phone. If you have a critical or abnormal lab result, we will notify you by phone as soon as  "possible.  Submit refill requests through [employer] or call your pharmacy and they will forward the refill request to us. Please allow 3 business days for your refill to be completed.          Additional Information About Your Visit        [employer] Information     [employer] lets you send messages to your doctor, view your test results, renew your prescriptions, schedule appointments and more. To sign up, go to [email or web address] . Click on \"Log in\" on the left side of the screen, which will take you to the Welcome page. Then click on \"Sign up Now\" on the right side of the page.     You will be asked to enter the access code listed below, as well as some personal information. Please follow the directions to create your username and password.     Your access code is: [email or web address]  Expires: 2017 12:15 PM     Your access code will  in 90 days. If you need help or a new code, please call your [place] clinic or [phone].        Care EveryWhere ID     This is your Care EveryWhere ID. This could be used by other organizations to access your [place] medical records  [id number]        Your Vitals Were     Pulse Temperature Height Pulse Oximetry BMI (Body Mass Index)       106 98.1  F (36.7  C) (Oral) 5' 1\" (1.549 m) 99% 22.69 kg/m2        Blood Pressure from Last 3 Encounters:   17 100/58   17 104/60   17 104/72    Weight from Last 3 Encounters:   17 120 lb 1.6 oz (54.5 kg)   17 120 lb (54.4 kg)   17 118 lb (53.5 kg)              We Performed the Following     Basic metabolic panel     Hemoglobin A1c     Hemoglobin          Today's Medication Changes          These changes are accurate as of: 17 11:06 AM.  If you have any questions, ask your nurse or doctor.               Start taking these medicines.        Dose/Directions    aspirin 81 MG EC tablet   Used for:  Cardiogenic shock (H), Type 2 diabetes mellitus without complication, with long-term current use of insulin " (H)   Started by:  Glenn Brown MD        Dose:  81 mg   Take 1 tablet (81 mg) by mouth daily   Quantity:  90 tablet   Refills:  3       ASPIRIN NOT PRESCRIBED   Commonly known as:  INTENTIONAL   Used for:  Cardiogenic shock (H), Type 2 diabetes mellitus without complication, with long-term current use of insulin (H)   Started by:  Glenn Brown MD        Please choose reason not prescribed, below   Quantity:  0 each   Refills:  0            Where to get your medicines      These medications were sent to [facility]/pharmacy #[id number] [facility] [address] [facility]  [address] [place] [address]     Phone:  [phone]     aspirin 81 MG EC tablet         Some of these will need a paper prescription and others can be bought over the counter.  Ask your nurse if you have questions.     You don't need a prescription for these medications     ASPIRIN NOT PRESCRIBED                Primary Care Provider    Physician No Ref-Primary       No address on file        Thank you!     Thank you for choosing [facility]  for your care. Our goal is always to provide you with excellent care. Hearing back from our patients is one way we can continue to improve our services. Please take a few minutes to complete the written survey that you may receive in the mail after your visit with us. Thank you!             Your Updated Medication List - Protect others around you: Learn how to safely use, store and throw away your medicines at www.disposemymeds.org.          This list is accurate as of: 6/6/17 11:06 AM.  Always use your most recent med list.                   Brand Name Dispense Instructions for use    acetaminophen 325 MG tablet    TYLENOL    100 tablet    Take 2 tablets (650 mg) by mouth every 4 hours as needed for mild pain or fever       alcohol swab prep pads     100 each    Use to swab area of injection/mary alice as directed 3 x per day       ascorbic acid 500 MG Tabs     2 tablet    Take  1 tablet (500 mg) by mouth daily       aspirin 81 MG EC tablet     90 tablet    Take 1 tablet (81 mg) by mouth daily       ASPIRIN NOT PRESCRIBED    INTENTIONAL    0 each    Please choose reason not prescribed, below       atorvastatin 40 MG tablet    LIPITOR    60 tablet    1 tablet (40 mg) by Oral or Feeding Tube route daily       beta carotene 86107 UNIT capsule     2 capsule    Take 1 capsule (25,000 Units) by mouth daily       blood glucose monitoring test strip    no brand specified    100 strip    Use to test blood sugar 3 times daily or as directed.       bumetanide 0.5 MG tablet    BUMEX    120 tablet    Take 1 tablet (0.5 mg) by mouth 2 times daily       cetirizine 10 MG tablet    zyrTEC    60 tablet    Take 1 tablet (10 mg) by mouth daily       clopidogrel 75 MG tablet    PLAVIX    60 tablet    Take 1 tablet (75 mg) by mouth daily       digoxin 125 MCG tablet    LANOXIN    60 tablet    Take 1 tablet (125 mcg) by mouth daily       finasteride 5 MG tablet    PROSCAR    60 tablet    Take 1 tablet (5 mg) by mouth daily       insulin glargine 100 UNIT/ML injection    LANTUS    9 mL    Inject 25 Units Subcutaneous every morning (before breakfast)       insulin pen needle 32G X 4 MM    BD KYLEE U/F    100 each    Use 3 daily or as directed.       lisinopril 2.5 MG tablet    PRINIVIL/Zestril    120 tablet    Take 1 tablet (2.5 mg) by mouth 2 times daily       pantoprazole 40 MG EC tablet    PROTONIX    60 tablet    Take 1 tablet (40 mg) by mouth daily       potassium chloride SA 20 MEQ CR tablet    K-DUR/KLOR-CON M    120 tablet    Take 1 tablet (20 mEq) by mouth 2 times daily       Sharps Container Misc     1 each    1 each every 30 days       spironolactone 25 MG tablet    ALDACTONE    30 tablet    Take 0.5 tablets (12.5 mg) by mouth daily       tamsulosin 0.4 MG capsule    FLOMAX    60 capsule    Take 1 capsule (0.4 mg) by mouth daily       * Warfarin Therapy Reminder      1 each continuous prn       * warfarin  3 MG tablet    COUMADIN    30 tablet    Take 1 tablet (3 mg) by mouth daily       zinc sulfate 220 (50 ZN) MG capsule    ZINCATE    2 capsule    Take 1 capsule (220 mg) by mouth daily       * Notice:  This list has 2 medication(s) that are the same as other medications prescribed for you. Read the directions carefully, and ask your doctor or other care provider to review them with you.

## 2017-06-08 ENCOUNTER — RADIANT APPOINTMENT (OUTPATIENT)
Dept: CARDIOLOGY | Facility: CLINIC | Age: 52
End: 2017-06-08
Attending: INTERNAL MEDICINE

## 2017-06-08 ENCOUNTER — OFFICE VISIT (OUTPATIENT)
Dept: CARDIOLOGY | Facility: CLINIC | Age: 52
End: 2017-06-08
Attending: INTERNAL MEDICINE
Payer: COMMERCIAL

## 2017-06-08 VITALS
WEIGHT: 122.5 LBS | DIASTOLIC BLOOD PRESSURE: 70 MMHG | OXYGEN SATURATION: 98 % | SYSTOLIC BLOOD PRESSURE: 99 MMHG | BODY MASS INDEX: 22.54 KG/M2 | HEIGHT: 62 IN | HEART RATE: 68 BPM

## 2017-06-08 DIAGNOSIS — I34.0 MITRAL VALVE INSUFFICIENCY, UNSPECIFIED ETIOLOGY: ICD-10-CM

## 2017-06-08 DIAGNOSIS — I34.0 MITRAL VALVE INSUFFICIENCY, UNSPECIFIED ETIOLOGY: Primary | ICD-10-CM

## 2017-06-08 LAB
ANION GAP SERPL CALCULATED.3IONS-SCNC: 8 MMOL/L (ref 3–14)
BUN SERPL-MCNC: 13 MG/DL (ref 7–30)
CALCIUM SERPL-MCNC: 9.1 MG/DL (ref 8.5–10.1)
CHLORIDE SERPL-SCNC: 102 MMOL/L (ref 94–109)
CO2 SERPL-SCNC: 30 MMOL/L (ref 20–32)
CREAT SERPL-MCNC: 1.05 MG/DL (ref 0.66–1.25)
ERYTHROCYTE [DISTWIDTH] IN BLOOD BY AUTOMATED COUNT: 14.8 % (ref 10–15)
GFR SERPL CREATININE-BSD FRML MDRD: 75 ML/MIN/1.7M2
GLUCOSE SERPL-MCNC: 210 MG/DL (ref 70–99)
HCT VFR BLD AUTO: 37.1 % (ref 40–53)
HGB BLD-MCNC: 12 G/DL (ref 13.3–17.7)
MCH RBC QN AUTO: 29.4 PG (ref 26.5–33)
MCHC RBC AUTO-ENTMCNC: 32.3 G/DL (ref 31.5–36.5)
MCV RBC AUTO: 91 FL (ref 78–100)
PLATELET # BLD AUTO: 267 10E9/L (ref 150–450)
POTASSIUM SERPL-SCNC: 4.5 MMOL/L (ref 3.4–5.3)
RBC # BLD AUTO: 4.08 10E12/L (ref 4.4–5.9)
SODIUM SERPL-SCNC: 140 MMOL/L (ref 133–144)
WBC # BLD AUTO: 9 10E9/L (ref 4–11)

## 2017-06-08 PROCEDURE — 80048 BASIC METABOLIC PNL TOTAL CA: CPT | Performed by: INTERNAL MEDICINE

## 2017-06-08 PROCEDURE — 99203 OFFICE O/P NEW LOW 30 MIN: CPT | Mod: 24 | Performed by: INTERNAL MEDICINE

## 2017-06-08 PROCEDURE — 99212 OFFICE O/P EST SF 10 MIN: CPT | Mod: ZF

## 2017-06-08 PROCEDURE — 36415 COLL VENOUS BLD VENIPUNCTURE: CPT | Performed by: INTERNAL MEDICINE

## 2017-06-08 PROCEDURE — 85027 COMPLETE CBC AUTOMATED: CPT | Performed by: INTERNAL MEDICINE

## 2017-06-08 RX ORDER — METOPROLOL SUCCINATE 25 MG/1
12.5 TABLET, EXTENDED RELEASE ORAL DAILY
Qty: 45 TABLET | Refills: 3 | Status: SHIPPED | OUTPATIENT
Start: 2017-06-08 | End: 2017-09-11

## 2017-06-08 ASSESSMENT — PAIN SCALES - GENERAL: PAINLEVEL: NO PAIN (0)

## 2017-06-08 NOTE — MR AVS SNAPSHOT
After Visit Summary   6/8/2017    Luke Henao    MRN: [id number]           Patient Information     Date Of Birth          1965        Visit Information        Provider Department      6/8/2017 10:00 AM Ron Christianson MD [facility]        Today's Diagnoses     Mitral valve insufficiency, unspecified etiology    -  1      Care Instructions    1.  PLEASE START THIS NEW MEDICATION:    METOPROLOL XL (TOPROL XL) 12.5 MG, BY MOUTH, DAILY.      DR. CHRISTIANSON WOULD LIKE TO SEE YOU BACK IN TWO MONTHS, WITH AN ECHOCARDIOGRAM PRIOR.     Nursing questions: [phone] option 1 then chose option 3 for the triage nurse, and then ask to speak with Mariah.  For emergencies call 911.    It was a pleasure to see you in clinic.  If you have any questions at all, please feel free to give me a call.      Mariah Philip, RN  Structural Heart Care Coordinator   TAVR and MitraClip Programs  [facility]  Office: [phone]    Clinics and Surgery Center  [address] [place] [address]  Cardiology Clinic [facility] [address] [place] [address]              Follow-ups after your visit        Follow-up notes from your care team     Return in about 2 months (around 8/8/2017) for Mitral valve regurg, w/Dr. Christianson.      Your next 10 appointments already scheduled     Jun 09, 2017  8:15 AM CDT   Anticoagulation Visit with  ANTICOAGULATION CLINIC   [facility] ([facility])    [address] [id number] [phone]            Jun 12, 2017  8:00 AM CDT   Neuro Treatment with Mariangel Hayward, SLP   [facility] Speech Therapy ([facility])    [address]  [facility] [address]   [phone]            Jun 12, 2017  9:00 AM CDT   Neuro Eval with Lilian Mock, PT   [facility] Physical Therapy ([facility])    [address]  [facility] [id number] [phone]            Jun 12, 2017   9:45 AM CDT   Neuro Treatment with Jeanne Anderson, OT   [facility] Occupational Therapy ([facility])    [address] [place] [address]  [address]  [facility] [address]   [phone]            Jun 12, 2017  2:45 PM CDT   Cardiac Treatment with  Cardiac Rehab 1   [facility] Cardiac Rehab ([facility])    [address]  [facility] [address]   [phone]            Jun 16, 2017  3:00 PM CDT   Cardiac Treatment with  Cardiac Rehab 1   [facility] Cardiac Rehab ([facility])    [address]  [facility] [address]   [phone]            Jun 19, 2017  3:00 PM CDT   Cardiac Treatment with  Cardiac Rehab 1   [facility] Cardiac Rehab ([facility])    [address]  [facility] [address]   [phone]            Jun 21, 2017  9:00 AM CDT   Neuro Treatment with Mariangel Hayward, SLP   [facility] Speech Therapy ([facility])    [address]  [address]  [facility] [address]   [phone]            Jun 21, 2017  9:45 AM CDT   Neuro Treatment with Viki Cole, OT   [facility] Occupational Therapy ([facility])    [address] [place] [address]  [address]  [facility] [address]   [phone]            Jun 23, 2017  3:00 PM CDT   Cardiac Treatment with  Cardiac Rehab 1   [facility] Cardiac Rehab ([facility])    [address]  [facility] [address]   [phone]              Future tests that were ordered for you today     Open Future Orders        Priority Expected Expires Ordered    Echocardiogram Limited Routine  6/8/2018 6/8/2017            Who to contact     If you have questions or need follow up information about today's clinic visit or your schedule please contact [facility] directly at [phone].  Normal or non-critical lab and imaging results will be communicated to you by MyChart, letter or phone within 4  "business days after the clinic has received the results. If you do not hear from us within 7 days, please contact the clinic through [employer] or phone. If you have a critical or abnormal lab result, we will notify you by phone as soon as possible.  Submit refill requests through [employer] or call your pharmacy and they will forward the refill request to us. Please allow 3 business days for your refill to be completed.          Additional Information About Your Visit        [employer] Information     [employer] lets you send messages to your doctor, view your test results, renew your prescriptions, schedule appointments and more. To sign up, go to [email or web address] . Click on \"Log in\" on the left side of the screen, which will take you to the Welcome page. Then click on \"Sign up Now\" on the right side of the page.     You will be asked to enter the access code listed below, as well as some personal information. Please follow the directions to create your username and password.     Your access code is: [email or web address]  Expires: 2017 12:15 PM     Your access code will  in 90 days. If you need help or a new code, please call your [place] clinic or [phone].        Care EveryWhere ID     This is your Care EveryWhere ID. This could be used by other organizations to access your [place] medical records  [id number]        Your Vitals Were     Pulse Height Pulse Oximetry BMI (Body Mass Index)          68 1.575 m (5' 2\") 98% 22.41 kg/m2         Blood Pressure from Last 3 Encounters:   17 99/70   17 100/58   17 104/60    Weight from Last 3 Encounters:   17 55.6 kg (122 lb 8 oz)   17 54.5 kg (120 lb 1.6 oz)   17 54.4 kg (120 lb)                 Today's Medication Changes          These changes are accurate as of: 17 11:08 AM.  If you have any questions, ask your nurse or doctor.               Start taking these medicines.        Dose/Directions    metoprolol 25 MG 24 hr tablet "   Commonly known as:  TOPROL-XL   Used for:  Mitral valve insufficiency, unspecified etiology   Started by:  Ron Cortez MD        Dose:  12.5 mg   Take 0.5 tablets (12.5 mg) by mouth daily   Quantity:  45 tablet   Refills:  3            Where to get your medicines      These medications were sent to [facility]/pharmacy #[id number] [place], [place] - [address] [facility]  [address] [facility] [address]     Phone:  [phone]     metoprolol 25 MG 24 hr tablet                Primary Care Provider    Physician No Ref-Primary       No address on file        Thank you!     Thank you for choosing [facility]  for your care. Our goal is always to provide you with excellent care. Hearing back from our patients is one way we can continue to improve our services. Please take a few minutes to complete the written survey that you may receive in the mail after your visit with us. Thank you!             Your Updated Medication List - Protect others around you: Learn how to safely use, store and throw away your medicines at www.disposemymeds.org.          This list is accurate as of: 6/8/17 11:08 AM.  Always use your most recent med list.                   Brand Name Dispense Instructions for use    acetaminophen 325 MG tablet    TYLENOL    100 tablet    Take 2 tablets (650 mg) by mouth every 4 hours as needed for mild pain or fever       alcohol swab prep pads     100 each    Use to swab area of injection/mary alice as directed 3 x per day       ascorbic acid 500 MG Tabs     2 tablet    Take 1 tablet (500 mg) by mouth daily       aspirin 81 MG EC tablet     90 tablet    Take 1 tablet (81 mg) by mouth daily       ASPIRIN NOT PRESCRIBED    INTENTIONAL    0 each    Please choose reason not prescribed, below       atorvastatin 40 MG tablet    LIPITOR    60 tablet    1 tablet (40 mg) by Oral or Feeding Tube route daily       beta carotene 90060 UNIT capsule     2 capsule    Take 1 capsule (25,000 Units) by mouth daily        blood glucose monitoring test strip    no brand specified    100 strip    Use to test blood sugar 3 times daily or as directed.       bumetanide 0.5 MG tablet    BUMEX    120 tablet    Take 1 tablet (0.5 mg) by mouth 2 times daily       cetirizine 10 MG tablet    zyrTEC    60 tablet    Take 1 tablet (10 mg) by mouth daily       clopidogrel 75 MG tablet    PLAVIX    60 tablet    Take 1 tablet (75 mg) by mouth daily       digoxin 125 MCG tablet    LANOXIN    60 tablet    Take 1 tablet (125 mcg) by mouth daily       finasteride 5 MG tablet    PROSCAR    60 tablet    Take 1 tablet (5 mg) by mouth daily       insulin glargine 100 UNIT/ML injection    LANTUS    9 mL    Inject 25 Units Subcutaneous every morning (before breakfast)       insulin pen needle 32G X 4 MM    BD KYLEE U/F    100 each    Use 3 daily or as directed.       lisinopril 2.5 MG tablet    PRINIVIL/Zestril    120 tablet    Take 1 tablet (2.5 mg) by mouth 2 times daily       metoprolol 25 MG 24 hr tablet    TOPROL-XL    45 tablet    Take 0.5 tablets (12.5 mg) by mouth daily       pantoprazole 40 MG EC tablet    PROTONIX    60 tablet    Take 1 tablet (40 mg) by mouth daily       potassium chloride SA 20 MEQ CR tablet    K-DUR/KLOR-CON M    120 tablet    Take 1 tablet (20 mEq) by mouth 2 times daily       Sharps Container Misc     1 each    1 each every 30 days       spironolactone 25 MG tablet    ALDACTONE    30 tablet    Take 0.5 tablets (12.5 mg) by mouth daily       tamsulosin 0.4 MG capsule    FLOMAX    60 capsule    Take 1 capsule (0.4 mg) by mouth daily       * Warfarin Therapy Reminder      1 each continuous prn       * warfarin 3 MG tablet    COUMADIN    30 tablet    Take 1 tablet (3 mg) by mouth daily       zinc sulfate 220 (50 ZN) MG capsule    ZINCATE    2 capsule    Take 1 capsule (220 mg) by mouth daily       * Notice:  This list has 2 medication(s) that are the same as other medications prescribed for you. Read the directions carefully, and  ask your doctor or other care provider to review them with you.

## 2017-06-08 NOTE — PROGRESS NOTES
HPI: Mr. Luke Henao is a 51-year-old Kisw[place]ili gentleman with Hx of tobacco use and DM, who presented to [facility] 03/26 with RCA STEMI transferred to [facility] after POBA, for further workup and management of cardiogenic shock, which led to a 6-week hospital stay  (3/29/17-5/12/17), revascularization with  DESx7 to RCA (culprit), LCx (, now closed) and LAD on 3/27, with refractory cardiogenic shock requiring intraaortic balloon pump, eventually subclavian balloon pump, multiple episodes of PEDRO, sepsis, sacral ulcer, DVT on a/c, b/l hemispheric infarcts likely due to watershed ischemia,  eventually refractory CS was attributed to ischemic MR on iCM (EF25-30%), and pt was treated with percutaneous MV repair with MitraClip (5/1/17), which led to reduction in MR from severe to mild, and substantial clinic improvement, weaning of IABP, and eventual discharge to rehabilitation, where pt stayed from 5/12/17 -5/26/17. Mr Henao seems to have made excellent progress in rehabilitation, and has now been living at home for the past 2 weeks. He presents to clinic with his wife and an interpretor, and seems to be in excellent spirits. His exercise tolerance is essentially not limited by dyspnea, and he has no signs of volume overload. His echo today showed MitraClip in appropriate position, minimal MR, acceptable inflow gradient, and moderately reduced EF (30-35%) with inferior akinesis.    His diuretics and acei were reportedly adjusted, but the patient does not know the exact dose at this time. His labs today show near complete recovery in renal function, and acceptable electrolytes. A beta blocker was intentionally withheld on discharge, so we will plan to start one today since pt has no signs of shock or volume overload.    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 He continues to participate in outpatient  cardiac rehab two times per week.      CURRENT MEDICATIONS:  Current Outpatient Prescriptions   Medication Sig Dispense Refill             aspirin 81 MG EC tablet Take 1 tablet (81 mg) by mouth daily 90 tablet 3     ASPIRIN NOT PRESCRIBED (INTENTIONAL) Please choose reason not prescribed, below 0 each 0     acetaminophen (TYLENOL) 325 MG tablet Take 2 tablets (650 mg) by mouth every 4 hours as needed for mild pain or fever 100 tablet 0     ascorbic acid 500 MG TABS Take 1 tablet (500 mg) by mouth daily 2 tablet 0     atorvastatin (LIPITOR) 40 MG tablet 1 tablet (40 mg) by Oral or Feeding Tube route daily 60 tablet 0     beta carotene 46267 UNIT capsule Take 1 capsule (25,000 Units) by mouth daily 2 capsule 0     bumetanide (BUMEX) 0.5 MG tablet Take 1 tablet (0.5 mg) by mouth 2 times daily 120 tablet 0     cetirizine (ZYRTEC) 10 MG tablet Take 1 tablet (10 mg) by mouth daily 60 tablet 0     clopidogrel (PLAVIX) 75 MG tablet Take 1 tablet (75 mg) by mouth daily 60 tablet 0     digoxin (LANOXIN) 125 MCG tablet Take 1 tablet (125 mcg) by mouth daily 60 tablet 0     finasteride (PROSCAR) 5 MG tablet Take 1 tablet (5 mg) by mouth daily 60 tablet 0     insulin glargine (LANTUS) 100 UNIT/ML injection Inject 25 Units Subcutaneous every morning (before breakfast) 9 mL 0     lisinopril (PRINIVIL/ZESTRIL) 2.5 MG tablet Take 1 tablet (2.5 mg) by mouth 2 times daily 120 tablet 0     pantoprazole (PROTONIX) 40 MG EC tablet Take 1 tablet (40 mg) by mouth daily 60 tablet 0     potassium chloride SA (K-DUR/KLOR-CON M) 20 MEQ CR tablet Take 1 tablet (20 mEq) by mouth 2 times daily 120 tablet 0     spironolactone (ALDACTONE) 25 MG tablet Take 0.5 tablets (12.5 mg) by mouth daily 30 tablet 0     tamsulosin (FLOMAX) 0.4 MG capsule Take 1 capsule (0.4 mg) by mouth daily 60 capsule 0     warfarin (COUMADIN) 3 MG tablet Take 1 tablet (3 mg) by mouth daily 30 tablet 0     zinc sulfate (ZINCATE) 220 (50 ZN) MG capsule Take 1 capsule (220 mg)  by mouth daily 2 capsule 0     blood glucose monitoring (NO BRAND SPECIFIED) test strip Use to test blood sugar 3 times daily or as directed. 100 strip 6     alcohol swab prep pads Use to swab area of injection/mary alice as directed 3 x per day 100 each 3     Sharps Container MISC 1 each every 30 days 1 each 0     insulin pen needle (BD KYLEE U/F) 32G X 4 MM Use 3 daily or as directed. 100 each prn     Warfarin Therapy Reminder 1 each continuous prn         PAST SURGICAL HISTORY:  Past Surgical History:   Procedure Laterality Date     COLONOSCOPY N/A 4/17/2017    Procedure: COLONOSCOPY;  Surgeon: Rashaad Bundy MD;  Location: [facility] GI     INSERT INTRAAORTIC BALLOON PUMP Right 4/19/2017    Procedure: INSERT INTRAAORTIC BALLOON PUMP;  Right Subclavian Intra Aortic Balloon Pump Insertion using Maquet 40cc Ballon Catheter, Implentation of 8mm Gelweave Woven Vascular Prosthesis, Removal of Left Femoral Ballon Pump Catheter, Flouroscopy;  Surgeon: Keshav Leung MD;  Location: [facility] OR     PERCUTANEOUS MITRAL VALVE REPAIR N/A 5/1/2017    Procedure: PERCUTANEOUS MITRAL VALVE REPAIR ANESTHESIA;  Mitraclip Procedure Possible Cardiopulmonary Bypass ;  Surgeon: Ron Cortez MD;  Location: [facility] OR     SUBCLAVIAN AORTIC VALVE IMPLANT N/A 5/8/2017    Procedure: SUBCLAVIAN AORTIC VALVE IMPLANT;  Right Subclavian Graft Removal ;  Surgeon: Keshav Leung MD;  Location: [facility] OR       ALLERGIES   No Known Allergies    FAMILY HISTORY:  No family history on file.    SOCIAL HISTORY:  Social History     Social History     Marital status:      Spouse name: N/A     Number of children: N/A     Years of education: N/A     Social History Main Topics     Smoking status: Former Smoker     Packs/day: 0.50     Types: Cigarettes     Smokeless tobacco: None     Alcohol use No     Drug use: No     Sexual activity: Not Currently     Partners: Female     Other Topics Concern     None     Social History Narrative       ROS:  "  Constitutional: No fever, chills, or sweats. No weight gain/loss   ENT: No visual disturbance, ear ache, epistaxis, sore throat  Allergies/Immunologic: Negative.   Respiratory: No cough, hemoptysia  Cardiovascular: As per HPI  GI: No nausea, vomiting, hematemesis, melena, or hematochezia  : No urinary frequency, dysuria, or hematuria  Integument: Negative  Psychiatric: Negative  Neuro: Negative  Endocrinology: Negative   Musculoskeletal: Negative    EXAM:  BP 99/70 (BP Location: Left arm, Patient Position: Chair, Cuff Size: Adult Regular)  Pulse 68  Ht 1.575 m (5' 2\")  Wt 55.6 kg (122 lb 8 oz)  SpO2 98%  BMI 22.41 kg/m2  Comf, NAD  MMM  Eomi  JVP 5cm  CTA B No rales  S1s2 rrr no murmer  No cce warm 1+DP  R subclavian incision from IABP, dressing CDI, packed with iodoform, no pus or fluctuance,  No surrounding erythema      Labs:  LIPID RESULTS:  Lab Results   Component Value Date    CHOL 128 03/27/2017    HDL 36 (L) 03/27/2017    LDL 77 03/27/2017    TRIG 76 03/27/2017    NHDL 92 03/27/2017       LIVER ENZYME RESULTS:  Lab Results   Component Value Date    AST 25 05/08/2017    ALT 30 05/08/2017       CBC RESULTS:  Lab Results   Component Value Date    WBC 9.0 06/08/2017    RBC 4.08 (L) 06/08/2017    HGB 12.0 (L) 06/08/2017    HCT 37.1 (L) 06/08/2017    MCV 91 06/08/2017    MCH 29.4 06/08/2017    MCHC 32.3 06/08/2017    RDW 14.8 06/08/2017     06/08/2017       BMP RESULTS:  Lab Results   Component Value Date     06/08/2017    POTASSIUM 4.5 06/08/2017    CHLORIDE 102 06/08/2017    CO2 30 06/08/2017    ANIONGAP 8 06/08/2017     (H) 06/08/2017    BUN 13 06/08/2017    CR 1.05 06/08/2017    GFRESTIMATED 75 06/08/2017    GFRESTBLACK >90   GFR Calc   06/08/2017    ROB 9.1 06/08/2017        A1C RESULTS:  Lab Results   Component Value Date    A1C 5.7 06/06/2017       INR RESULTS:  Lab Results   Component Value Date    INR 1.8 (A) 06/05/2017    INR 3.2 (A) 06/02/2017    INR 2.55 (H) " 05/26/2017    INR 2.30 (H) 05/25/2017       Procedures:  ECHO 5/2/17  Interpretation Summary  S/p trans-catheter [id number]-ee-lqfz mitral valve repair (TMVR). There is less than  mild residual mitral regurgitation. The mean gradient is 4.7 mmHg at a heart  rate in excess of 115 bpm.  Dilation of the inferior vena cava is present with normal respiratory  variation in diameter. Estimated mean right atrial pressure is 3-8 mmHg.  No pericardial effusion is present.    ECHO 6/8/17  Interpretation Summary  Moderately (EF 30-35%) reduced left ventricular function is present. Inferior  wall akinesis. Basal and mid inferoseptal and inferolateral hypokinesis.  Global right ventricular function is mildly reduced.  S/p trans-catheter cqju-vq-qdpg mitral valve repair (TMVR). Mean gradient of  3.4 mmHg at a HR of 96 bpm. Trace to mild mitral insufficiency is present.  The inferior vena cava was normal in size with preserved respiratory  variability.  No pericardial effusion is present.  Compared to study from 5/2/17 there is no significant change.    Assessment and Plan: 51M with DM, former smoker, admission to [facility] 3/29/17-5/12/17 for RCA STEMI, new diagnosis 3vCAD treated with PCI x 7, new iCM (EF 30-35%), and ischemic MR c/b refractory cardiogenic shock with multisystem organ dysfunction, s/p MitraClip 5/1/17 which led to reduction in MR from severe to mild, and substantial clinic improvement, weaning of IABP, and eventual discharge to rehabilitation. Patient made significant progress at rehabilitation and has now been living at home for 2 weeks. His echo today is unchanged showing device in good position, mild MR, acceptable inflow gradient, and moderately reduced EF (30-35%) with inferior akinesis. Patient is doing very well clinically, exercise tolerance is essentially unlimited, renal function has normalized, and no signs of volume overload.    # iCM (EF 30-35%), NYHA I-II, s/p PCI x7 to RCA (culprit), LCX (, stents later  found to be occluded), LAD, s/p MItraClip  - cont lisinopril at current dose  - cont spironolactone at current dose  - begin toprol xl 12.5, given no signs of shock or fluid overload  - cont bumex 0.5 bid, euvolemic, and electrolytes acceptable  - cont digoxin  - cont high intensity statin  - on coumadin for DVT for 3-6 months and plavix for at least one year (likely longer)  - once coumadin is discontinued, should be resumed on asa 81 lifelong  - DM management per PCP  - cont cardiac rehab  - RTC in 1-2 months, continue GDMT as above, will repeat echo at that time and refer for ICD for primary prevention of SCD if EF <35%    Discussed plan in detail with patient and with Dr Christianson.    [facility]  Cardiology Fellow  [phone]    Patient examined, chart reviewed and the above reflects our joint assessment and plans.      Iva Christianson MD        CC  Patient Care Team:  No Ref-Primary, Physician as PCP - Lzu Montes De Oca as Nurse Coordinator (Cardiology)  Keenan Tierney EP as Cardiac Rehabilitation Therapist (Cardiac Rehab)  IVA CHRISTIANSON

## 2017-06-08 NOTE — PATIENT INSTRUCTIONS
1.  PLEASE START THIS NEW MEDICATION:    METOPROLOL XL (TOPROL XL) 12.5 MG, BY MOUTH, DAILY.      DR. CHRISTIANSON WOULD LIKE TO SEE YOU BACK IN TWO MONTHS, WITH AN ECHOCARDIOGRAM PRIOR.     Nursing questions: [phone] option 1 then chose option 3 for the triage nurse, and then ask to speak with Mariah.  For emergencies call 911.    It was a pleasure to see you in clinic.  If you have any questions at all, please feel free to give me a call.      Mariah Philip RN  Structural Heart Care Coordinator   TAVR and MitraClip Programs  [facility]  Office: [phone]    [facility]  [address] [place] [address]  Cardiology Clinic [id number]  [place] [address]

## 2017-06-08 NOTE — NURSING NOTE
Chief Complaint   Patient presents with     Follow Up For     EKG: dx: mitral valve regurgitation; 50 yo M, here in cardiology clinic for f/u after mitraclip procedure.      Med Reconcile: Reviewed and verified all current medications with the patient. The updated medication list was printed and given to the patient.  Return Appointment: Patient given instructions regarding scheduling next clinic visit. Patient demonstrated understanding of this information and agreed to call with further questions or concerns.  Patient stated he understood all health information given and agreed to call with further questions or concerns.

## 2017-06-09 ENCOUNTER — ANTICOAGULATION THERAPY VISIT (OUTPATIENT)
Dept: ANTICOAGULATION | Facility: CLINIC | Age: 52
End: 2017-06-09
Payer: COMMERCIAL

## 2017-06-09 LAB — INR POINT OF CARE: 2.1 (ref 0.86–1.14)

## 2017-06-09 PROCEDURE — 85610 PROTHROMBIN TIME: CPT | Mod: QW

## 2017-06-09 PROCEDURE — 36416 COLLJ CAPILLARY BLOOD SPEC: CPT

## 2017-06-09 NOTE — MR AVS SNAPSHOT
Javiertrish DEBBI Henao   6/9/2017 8:15 AM   Anticoagulation Therapy Visit    Description:  51 year old male   Provider:  [facility] [employer];  ANTICOAGULATION CLINIC   Department:   Anti Coagulation           INR as of 6/9/2017     Today's INR 2.1      Anticoagulation Summary as of 6/9/2017     INR goal 2.0-3.0   Today's INR 2.1   Full instructions 6/9: 4.5 mg; 6/10: 3 mg; 6/11: 3 mg; 6/12: 3 mg; Otherwise No maintenance plan   Next INR check 6/13/2017    Indications   Stroke (H) [I63.9]         Your next Anticoagulation Clinic appointment(s)     Jun 13, 2017 12:15 PM CDT   Anticoagulation Visit with  ANTICOAGULATION CLINIC   [facility]    [address] [place] [address] [phone]              Contact Numbers     [facility]  Please call  [phone] to cancel and/or reschedule your appointment   Please call  [phone] with any problems or questions regarding your therapy.        June 2017 Details    Sun Mon Tue Wed Thu Fri Sat         1               2               3                 4               5               6               7               8               9      4.5 mg   See details      10      3 mg           11      3 mg         12      3 mg         13            14               15               16               17                 18               19               20               21               22               23               24                 25               26               27               28               29               30                 Date Details   06/09 This INR check       Date of next INR:  6/13/2017         How to take your warfarin dose     To take:  3 mg Take 1 of the 3 mg tablets.    To take:  4.5 mg Take 1.5 of the 3 mg tablets.

## 2017-06-09 NOTE — PROGRESS NOTES
ANTICOAGULATION FOLLOW-UP CLINIC VISIT    Patient Name:  Luke Henao  Date:  6/9/2017  Contact Type:  Face to Face    SUBJECTIVE:        OBJECTIVE    INR Protime   Date Value Ref Range Status   06/09/2017 2.1 (A) 0.86 - 1.14 Final       ASSESSMENT / PLAN  INR assessment THER    Recheck INR In: 4 DAYS    INR Location Clinic      Anticoagulation Summary as of 6/9/2017     INR goal 2.0-3.0   Today's INR 2.1   Maintenance plan No maintenance plan   Full instructions 6/9: 4.5 mg; 6/10: 3 mg; 6/11: 3 mg; 6/12: 3 mg; Otherwise No maintenance plan   Next INR check 6/13/2017   Target end date     Indications   Stroke (H) [I63.9]         Anticoagulation Episode Summary     INR check location     Preferred lab     Send INR reminders to  [facility]    Comments             See the Encounter Report to view Anticoagulation Flowsheet and Dosing Calendar (Go to Encounters tab in chart review, and find the Anticoagulation Therapy Visit)        Velvet Jones RN

## 2017-06-12 ENCOUNTER — HOSPITAL ENCOUNTER (OUTPATIENT)
Dept: SPEECH THERAPY | Facility: CLINIC | Age: 52
Setting detail: THERAPIES SERIES
End: 2017-06-12
Attending: PHYSICAL MEDICINE & REHABILITATION
Payer: COMMERCIAL

## 2017-06-12 ENCOUNTER — HOSPITAL ENCOUNTER (OUTPATIENT)
Dept: OCCUPATIONAL THERAPY | Facility: CLINIC | Age: 52
Setting detail: THERAPIES SERIES
End: 2017-06-12
Attending: PHYSICAL MEDICINE & REHABILITATION
Payer: COMMERCIAL

## 2017-06-12 ENCOUNTER — HOSPITAL ENCOUNTER (OUTPATIENT)
Dept: PHYSICAL THERAPY | Facility: CLINIC | Age: 52
Setting detail: THERAPIES SERIES
End: 2017-06-12
Attending: PHYSICAL MEDICINE & REHABILITATION
Payer: COMMERCIAL

## 2017-06-12 PROCEDURE — 97535 SELF CARE MNGMENT TRAINING: CPT | Mod: GO

## 2017-06-12 PROCEDURE — 92507 TX SP LANG VOICE COMM INDIV: CPT | Mod: GN | Performed by: SPEECH-LANGUAGE PATHOLOGIST

## 2017-06-12 PROCEDURE — 97112 NEUROMUSCULAR REEDUCATION: CPT | Mod: GP | Performed by: PHYSICAL THERAPIST

## 2017-06-12 PROCEDURE — 97161 PT EVAL LOW COMPLEX 20 MIN: CPT | Mod: GP | Performed by: PHYSICAL THERAPIST

## 2017-06-12 PROCEDURE — 40000125 ZZHC STATISTIC OT OUTPT VISIT

## 2017-06-12 PROCEDURE — 40000719 ZZHC STATISTIC PT DEPARTMENT NEURO VISIT: Performed by: PHYSICAL THERAPIST

## 2017-06-12 PROCEDURE — 97110 THERAPEUTIC EXERCISES: CPT | Mod: GO

## 2017-06-12 PROCEDURE — 40000211 ZZHC STATISTIC SLP  DEPARTMENT VISIT: Performed by: SPEECH-LANGUAGE PATHOLOGIST

## 2017-06-12 PROCEDURE — 97116 GAIT TRAINING THERAPY: CPT | Mod: GP | Performed by: PHYSICAL THERAPIST

## 2017-06-13 ENCOUNTER — TELEPHONE (OUTPATIENT)
Dept: INTERNAL MEDICINE | Facility: CLINIC | Age: 52
End: 2017-06-13

## 2017-06-13 ENCOUNTER — ANTICOAGULATION THERAPY VISIT (OUTPATIENT)
Dept: ANTICOAGULATION | Facility: CLINIC | Age: 52
End: 2017-06-13
Payer: COMMERCIAL

## 2017-06-13 DIAGNOSIS — I63.30 CEREBROVASCULAR ACCIDENT (CVA) DUE TO THROMBOSIS OF CEREBRAL ARTERY (H): ICD-10-CM

## 2017-06-13 LAB — INR POINT OF CARE: 2.8 (ref 0.86–1.14)

## 2017-06-13 PROCEDURE — 36416 COLLJ CAPILLARY BLOOD SPEC: CPT

## 2017-06-13 PROCEDURE — 85610 PROTHROMBIN TIME: CPT | Mod: QW

## 2017-06-13 NOTE — MR AVS SNAPSHOT
Luke Henao   6/13/2017 12:00 PM   Anticoagulation Therapy Visit    Description:  51 year old male   Provider:  [facility] [employer];  ANTICOAGULATION CLINIC   Department:   Anti Coagulation           INR as of 6/13/2017     Today's INR 2.8      Anticoagulation Summary as of 6/13/2017     INR goal 2.0-3.0   Today's INR 2.8   Full instructions 3 mg every day   Next INR check 6/20/2017    Indications   Stroke (H) [I63.9]         Your next Anticoagulation Clinic appointment(s)     Jun 20, 2017 12:15 PM CDT   Anticoagulation Visit with  ANTICOAGULATION CLINIC   [facility]    [address] [address] [phone]              Contact Numbers     [facility]  Please call  [phone] to cancel and/or reschedule your appointment   Please call  [phone] with any problems or questions regarding your therapy.        June 2017 Details    Sun Mon Tue Wed Thu Fri Sat         1               2               3                 4               5               6               7               8               9               10                 11               12               13      3 mg   See details      14      3 mg         15      3 mg         16      3 mg         17      3 mg           18      3 mg         19      3 mg         20            21               22               23               24                 25               26               27               28               29               30                 Date Details   06/13 This INR check       Date of next INR:  6/20/2017         How to take your warfarin dose     To take:  3 mg Take 1 of the 3 mg tablets.

## 2017-06-14 NOTE — PROGRESS NOTES
ANTICOAGULATION FOLLOW-UP CLINIC VISIT    Patient Name:  Luke Henao  Date:  6/13/2017  Contact Type:  Face to Face    SUBJECTIVE:        OBJECTIVE    INR Protime   Date Value Ref Range Status   06/13/2017 2.8 (A) 0.86 - 1.14 Final       ASSESSMENT / PLAN  INR assessment THER    Recheck INR In: 1 WEEK    INR Location Clinic      Anticoagulation Summary as of 6/13/2017     INR goal 2.0-3.0   Today's INR 2.8   Maintenance plan 3 mg (3 mg x 1) every day   Full instructions 3 mg every day   Weekly total 21 mg   Plan last modified Shalonda Fitzgerald, RN (6/13/2017)   Next INR check 6/20/2017   Target end date     Indications   Stroke (H) [I63.9]         Anticoagulation Episode Summary     INR check location     Preferred lab     Send INR reminders to  [facility]    Comments             See the Encounter Report to view Anticoagulation Flowsheet and Dosing Calendar (Go to Encounters tab in chart review, and find the Anticoagulation Therapy Visit)        Shalonda Fitzgerald RN

## 2017-06-14 NOTE — TELEPHONE ENCOUNTER
Patient needing medication for pain or sleep or both . Tyelenol is not helping causing him pain in legs =no sleep /sleep deprivation=pt fatigues at Physical Therapy . Please Rx pain medication and or sleep medication .Raisa Fitzgerald RN  Pt has appt with MD 7/18/17.Raisa Fitzgerald RN

## 2017-06-15 NOTE — TELEPHONE ENCOUNTER
Attempted to call patient via  services. Home number rings with no voicemail. Attempted cell, left message for patient to call back via  services.

## 2017-06-15 NOTE — PROGRESS NOTES
06/14/17 1100   Quick Adds   Type of Visit Initial OP PT Evaluation       Present Yes   Language (Ukrainian)   General Information   Start of Care Date 06/12/17   Referring Physician Dr. Chacon   Orders Evaluate and Treat as Indicated   Additional Orders OT/SLP/CR   Order Date 05/25/17   Medical Diagnosis Cardiogenic shock; CVA due to embolism of cerebral artery   Onset of illness/injury or Date of Surgery 03/26/17   Surgical/Medical history reviewed Yes   Pertinent history of current problem (include personal factors and/or comorbidities that impact the POC) 5/31/17 Patient is a 51-year-old [place]ian gentleman who presented to [facility] on 03/26/17 with severe MI. He was transferred to [facility] for futher workup and management on the 27th. Patient spent 6 weeks in the hospital due to cardiogenic shock requiring intraaortic balloon pump and temporary pacing management. Patient had a lyla clip on 05/01/17. Patient also experienced ischemic stroke in bilateral hemispheres during hospitalization. He was tranferred to acute rehab after DC from hospital for physical, occupational and speech therapy. From a mobility standpoint, patient is currently using a walker or cane. Patient reports he is working towards walking without cane in PT/OT. Patient reports he feel very weak since leaving acute rehab last week. He reports most of the strength he had is now gone.    Prior level of function comment Working full time- 7 days/week, 9-10 hours per day, planning to return 8/1/17 5 days/week, 8 hour days.   Current Community Support Family/friend caregiver   Patient role/Employment history Employed   Living environment [facility]   Home/Community Accessibility Comments Wife is present and supportive   Current Assistive Devices Front Wheeled Walker   Assistive Devices Comments using WW at home, starting to ambulate wtihout AD at home, using WW for walking tolerance at CR and home.   Patient/Family Goals  Statement I want to walk without my walker and return to work   Fall Risk Screen   Fall screen completed by PT   Per patient - Fall 2 or more times in past year? No   Per patient - Fall with injury in past year? No   Timed Up and Go score (seconds) 13.4 with WW; 12.1 without AD   Is patient a fall risk? Yes   Fall screen comments Fall risk due to extensive deconditioning, needing AD   Pain   Patient currently in pain No   Pain comments Pt does report muscle cramping and difficutly sleeping- encouranged pt to call and speak with MD.   Cognitive Status Examination   Orientation orientation to person, place and time   Level of Consciousness alert   Follows Commands and Answers Questions 100% of the time   Cognitive Comment Defer cognition to OT/SLP   Integumentary   Integumentary Comments Did not visualize incisions, pt reports all are well healing/healed   Range of Motion (ROM)   ROM Comment Left ankle DF deficits in AROM, full PROM   Strength   Strength Comments 2/5 left ankle DF, 4/5 left ankle PF, left hip flexion 4/5, HS 3+/5, hip abductors 4/5.   Transfer Skills   Transfer Comments Comes to standing with UE when uncued, with increased effort, able to rise to standing without UE assist.   Gait   Gait Comments ambulates into the clinic with WW, short shuffling steps   Balance Special Tests Modified CTSIB Conditions   Condition 1, seconds 30 Seconds   Condition 2, seconds 27 Seconds   Condition 4, seconds 30 Seconds   Condition 5, seconds 25 Seconds   Balance Special Tests Sit to Stand Reps in 30 Seconds   Reps in 30 seconds 6   Sensory Examination   Sensory Perception Comments Pt reports spams in legs, not N/T, wosening at night   Coordination   Coordination Comments impaired due to lack of ankle ROM at left side.   Planned Therapy Interventions   Planned Therapy Interventions balance training;gait training;motor coordination training;neuromuscular re-education;ROM;strengthening;stretching;transfer training;manual  therapy   Clinical Impression   Criteria for Skilled Therapeutic Interventions Met yes, treatment indicated   PT Diagnosis difficulty with gait   Influenced by the following impairments using AD to assist with balance during gait, left ankle weakness   Functional limitations due to impairments difficutly with gait   Clinical Presentation Stable/Uncomplicated   Clinical Presentation Rationale progressing as expected   Clinical Decision Making (Complexity) Low complexity   Therapy Frequency 1 time/week   Predicted Duration of Therapy Intervention (days/wks) 60 days   Risk & Benefits of therapy have been explained Yes   Patient, Family & other staff in agreement with plan of care Yes   Clinical Impression Comments PT to focus on gait and balance, triage activity tolerance to CR   Education Assessment   Preferred Learning Style Listening   Barriers to Learning Language   Goal 1   Goal Identifier GAIT WITHOUT AD   Goal Description 1. Patient will ambulate over a variety of surfaces in the community independently, without an assistive device.   Target Date 08/14/17   Goal 2   Goal Identifier FGA   Goal Description 2. Patient will score greater than 28/30 on the functional gait assessment in order to decrease fall risk in the community.   Target Date 08/14/17   Total Evaluation Time   Total Evaluation Time (Minutes) 25

## 2017-06-16 ENCOUNTER — HOSPITAL ENCOUNTER (OUTPATIENT)
Dept: CARDIAC REHAB | Facility: CLINIC | Age: 52
End: 2017-06-16
Attending: PHYSICAL MEDICINE & REHABILITATION
Payer: COMMERCIAL

## 2017-06-16 PROCEDURE — 40000116 ZZH STATISTIC OP CR VISIT: Performed by: OCCUPATIONAL THERAPIST

## 2017-06-16 PROCEDURE — 93798 PHYS/QHP OP CAR RHAB W/ECG: CPT | Performed by: OCCUPATIONAL THERAPIST

## 2017-06-19 ENCOUNTER — OFFICE VISIT (OUTPATIENT)
Dept: URGENT CARE | Facility: URGENT CARE | Age: 52
End: 2017-06-19
Payer: COMMERCIAL

## 2017-06-19 ENCOUNTER — HOSPITAL ENCOUNTER (OUTPATIENT)
Dept: CARDIAC REHAB | Facility: CLINIC | Age: 52
End: 2017-06-19
Attending: PHYSICAL MEDICINE & REHABILITATION
Payer: COMMERCIAL

## 2017-06-19 VITALS
HEART RATE: 113 BPM | DIASTOLIC BLOOD PRESSURE: 70 MMHG | TEMPERATURE: 98.8 F | BODY MASS INDEX: 21.93 KG/M2 | OXYGEN SATURATION: 99 % | SYSTOLIC BLOOD PRESSURE: 106 MMHG | WEIGHT: 119.9 LBS

## 2017-06-19 DIAGNOSIS — M79.604 LOWER EXTREMITY PAIN, BILATERAL: Primary | ICD-10-CM

## 2017-06-19 DIAGNOSIS — R25.1 SHAKINESS: ICD-10-CM

## 2017-06-19 DIAGNOSIS — M79.605 LOWER EXTREMITY PAIN, BILATERAL: Primary | ICD-10-CM

## 2017-06-19 DIAGNOSIS — R70.0 ELEVATED ERYTHROCYTE SEDIMENTATION RATE: ICD-10-CM

## 2017-06-19 DIAGNOSIS — R79.89 ELEVATED SERUM CREATININE: ICD-10-CM

## 2017-06-19 LAB
ALBUMIN SERPL-MCNC: 4.2 G/DL (ref 3.4–5)
ALP SERPL-CCNC: 73 U/L (ref 40–150)
ALT SERPL W P-5'-P-CCNC: 27 U/L (ref 0–70)
ANION GAP SERPL CALCULATED.3IONS-SCNC: 9 MMOL/L (ref 3–14)
AST SERPL W P-5'-P-CCNC: 21 U/L (ref 0–45)
BASOPHILS # BLD AUTO: 0.1 10E9/L (ref 0–0.2)
BASOPHILS NFR BLD AUTO: 0.9 %
BILIRUB SERPL-MCNC: 0.4 MG/DL (ref 0.2–1.3)
BUN SERPL-MCNC: 38 MG/DL (ref 7–30)
CALCIUM SERPL-MCNC: 9.3 MG/DL (ref 8.5–10.1)
CHLORIDE SERPL-SCNC: 100 MMOL/L (ref 94–109)
CK SERPL-CCNC: 79 U/L (ref 30–300)
CO2 SERPL-SCNC: 28 MMOL/L (ref 20–32)
CREAT SERPL-MCNC: 1.53 MG/DL (ref 0.66–1.25)
DIFFERENTIAL METHOD BLD: ABNORMAL
EOSINOPHIL # BLD AUTO: 1 10E9/L (ref 0–0.7)
EOSINOPHIL NFR BLD AUTO: 12.3 %
ERYTHROCYTE [DISTWIDTH] IN BLOOD BY AUTOMATED COUNT: 14.9 % (ref 10–15)
ERYTHROCYTE [SEDIMENTATION RATE] IN BLOOD BY WESTERGREN METHOD: 40 MM/H (ref 0–15)
GFR SERPL CREATININE-BSD FRML MDRD: 48 ML/MIN/1.7M2
GLUCOSE SERPL-MCNC: 201 MG/DL (ref 70–99)
HCT VFR BLD AUTO: 37 % (ref 40–53)
HGB BLD-MCNC: 12 G/DL (ref 13.3–17.7)
LYMPHOCYTES # BLD AUTO: 2.3 10E9/L (ref 0.8–5.3)
LYMPHOCYTES NFR BLD AUTO: 29.5 %
MCH RBC QN AUTO: 29.6 PG (ref 26.5–33)
MCHC RBC AUTO-ENTMCNC: 32.4 G/DL (ref 31.5–36.5)
MCV RBC AUTO: 91 FL (ref 78–100)
MONOCYTES # BLD AUTO: 0.8 10E9/L (ref 0–1.3)
MONOCYTES NFR BLD AUTO: 9.7 %
NEUTROPHILS # BLD AUTO: 3.7 10E9/L (ref 1.6–8.3)
NEUTROPHILS NFR BLD AUTO: 47.6 %
PLATELET # BLD AUTO: 270 10E9/L (ref 150–450)
POTASSIUM SERPL-SCNC: 4.2 MMOL/L (ref 3.4–5.3)
PROT SERPL-MCNC: 8.3 G/DL (ref 6.8–8.8)
RBC # BLD AUTO: 4.05 10E12/L (ref 4.4–5.9)
SODIUM SERPL-SCNC: 137 MMOL/L (ref 133–144)
WBC # BLD AUTO: 7.8 10E9/L (ref 4–11)

## 2017-06-19 PROCEDURE — 93798 PHYS/QHP OP CAR RHAB W/ECG: CPT | Performed by: OCCUPATIONAL THERAPIST

## 2017-06-19 PROCEDURE — 82550 ASSAY OF CK (CPK): CPT | Performed by: PHYSICIAN ASSISTANT

## 2017-06-19 PROCEDURE — 99214 OFFICE O/P EST MOD 30 MIN: CPT | Performed by: PHYSICIAN ASSISTANT

## 2017-06-19 PROCEDURE — 36415 COLL VENOUS BLD VENIPUNCTURE: CPT | Performed by: PHYSICIAN ASSISTANT

## 2017-06-19 PROCEDURE — 85652 RBC SED RATE AUTOMATED: CPT | Performed by: PHYSICIAN ASSISTANT

## 2017-06-19 PROCEDURE — 85025 COMPLETE CBC W/AUTO DIFF WBC: CPT | Performed by: PHYSICIAN ASSISTANT

## 2017-06-19 PROCEDURE — 80053 COMPREHEN METABOLIC PANEL: CPT | Performed by: PHYSICIAN ASSISTANT

## 2017-06-19 PROCEDURE — 40000116 ZZH STATISTIC OP CR VISIT: Performed by: OCCUPATIONAL THERAPIST

## 2017-06-19 RX ORDER — METHYLPREDNISOLONE 4 MG
TABLET, DOSE PACK ORAL
Qty: 21 TABLET | Refills: 0 | Status: SHIPPED | OUTPATIENT
Start: 2017-06-19 | End: 2017-07-03

## 2017-06-19 RX ORDER — ACETAMINOPHEN 500 MG
1000 TABLET ORAL EVERY 6 HOURS PRN
Qty: 30 TABLET | Refills: 0 | Status: SHIPPED | OUTPATIENT
Start: 2017-06-19 | End: 2017-12-22

## 2017-06-19 NOTE — MR AVS SNAPSHOT
After Visit Summary   6/19/2017    Luke Henao    MRN: [id number]           Patient Information     Date Of Birth          1965        Visit Information        Provider Department      6/19/2017 2:10 PM Gaurang Blevins, [facility]        Today's Diagnoses     Lower extremity pain, bilateral    -  1    Shakiness        Elevated serum creatinine        Elevated erythrocyte sedimentation rate           Follow-ups after your visit        Your next 10 appointments already scheduled     Jun 20, 2017 12:00 PM CDT   Anticoagulation Visit with [facility]   [facility]    [address] [address]   [phone]            Jun 21, 2017  9:00 AM CDT   Neuro Treatment with SELENE Phan   [facility] ([facility])    [address] [place] [address]  [address]  [facility] [address]   [phone]            Jun 21, 2017  9:45 AM CDT   Neuro Treatment with Viki Cole OT   [facility] ([facility])    [address] [place] [address]  [address]  [facility] [address]   [phone]            Jun 23, 2017  7:45 AM CDT   Cardiac Treatment with  Cardiac Rehab 1   [facility] ([facility])    [address]  [facility] [address]   [phone]            Jun 23, 2017  1:00 PM CDT   SHORT with Shanna Church MD   [facility] ([facility])    [address] [address]   [phone]            Jun 26, 2017  7:45 AM CDT   Cardiac Treatment with  Cardiac Rehab 1   [facility] ([facility])    [address]  [facility] [address]   [phone]            Jun 27, 2017  9:00 AM CDT   Neuro Treatment with SLEENE Phan   [facility] ([facility])    [address]  [address]  [facility] [address]   [phone]            Jun 27, 2017  9:45 AM CDT   Neuro Treatment with Viki Cole OT   [facility] ([facility])    [address]  [address]  [facility] [address]   [phone]            Jun 29, 2017  8:45 AM CDT   Neuro Treatment with Lilian Mock PT   [facility] ([facility])    [address]  [address]  [facility] [address]   [phone]            Jun 30, 2017  8:00 AM CDT   Cardiac Treatment with Sh Cardiac Rehab 1   [facility] ([facility])    [address]  [facility] [address]   [phone]              Who to contact     If you have questions or need follow up information about today's clinic visit or your schedule please contact [facility] directly at [phone].  Normal or non-critical lab and imaging results will be communicated to you by MEDEMhart, letter or phone within 4 business days after the clinic has received the results. If you do not hear from us within 7 days, please contact the clinic through [employer]EMhart or phone. If you have a critical or abnormal lab result, we will notify you by phone as soon as possible.  Submit refill requests through [employer] or call your pharmacy and they will forward the refill request to us. Please allow 3 business days for your refill to be completed.          Additional Information About Your Visit        MyChart Information     [employer] lets you send messages to your doctor, view your test results, renew your prescriptions, schedule appointments and more. To sign up, go to [email or web address] . Click on \"Log in\" on the left side of the screen, which will take you to the Welcome page. Then click on \"Sign up Now\" on the right side of the page.     You will be asked to enter the access code listed below, as well as some " personal information. Please follow the directions to create your username and password.     Your access code is: [email or web address]  Expires: 2017 12:15 PM     Your access code will  in 90 days. If you need help or a new code, please call your [place] clinic or [phone].        Care EveryWhere ID     This is your Care EveryWhere ID. This could be used by other organizations to access your [place] medical records  [id number]        Your Vitals Were     Pulse Temperature Pulse Oximetry BMI (Body Mass Index)          113 98.8  F (37.1  C) (Oral) 99% 21.93 kg/m2         Blood Pressure from Last 3 Encounters:   17 106/70   17 99/70   17 100/58    Weight from Last 3 Encounters:   17 119 lb 14.4 oz (54.4 kg)   17 122 lb 8 oz (55.6 kg)   17 120 lb 1.6 oz (54.5 kg)              We Performed the Following     CBC with platelets differential     CK total     Comprehensive metabolic panel     Erythrocyte sedimentation rate auto          Today's Medication Changes          These changes are accurate as of: 17  3:41 PM.  If you have any questions, ask your nurse or doctor.               Start taking these medicines.        Dose/Directions    methylPREDNISolone 4 MG tablet   Commonly known as:  MEDROL DOSEPAK   Used for:  Lower extremity pain, bilateral, Elevated erythrocyte sedimentation rate   Started by:  Gaurang Blevins PA-C        Follow package instructions   Quantity:  21 tablet   Refills:  0         These medicines have changed or have updated prescriptions.        Dose/Directions    * acetaminophen 325 MG tablet   Commonly known as:  TYLENOL   This may have changed:  Another medication with the same name was added. Make sure you understand how and when to take each.   Used for:  Coronary artery disease involving native coronary artery of native heart without angina pectoris        Dose:  650 mg   Take 2 tablets (650 mg) by mouth every 4 hours as needed for mild pain or  fever   Quantity:  100 tablet   Refills:  0       * acetaminophen 500 MG tablet   Commonly known as:  TYLENOL   This may have changed:  You were already taking a medication with the same name, and this prescription was added. Make sure you understand how and when to take each.   Used for:  Lower extremity pain, bilateral, Shakiness   Changed by:  Gaurang Blevins PA-C        Dose:  1000 mg   Take 2 tablets (1,000 mg) by mouth every 6 hours as needed for mild pain   Quantity:  30 tablet   Refills:  0       * Notice:  This list has 2 medication(s) that are the same as other medications prescribed for you. Read the directions carefully, and ask your doctor or other care provider to review them with you.         Where to get your medicines      Some of these will need a paper prescription and others can be bought over the counter.  Ask your nurse if you have questions.     Bring a paper prescription for each of these medications     acetaminophen 500 MG tablet    methylPREDNISolone 4 MG tablet                Primary Care Provider    Physician No Ref-Primary       No address on file        Thank you!     Thank you for choosing [facility]  for your care. Our goal is always to provide you with excellent care. Hearing back from our patients is one way we can continue to improve our services. Please take a few minutes to complete the written survey that you may receive in the mail after your visit with us. Thank you!             Your Updated Medication List - Protect others around you: Learn how to safely use, store and throw away your medicines at www.disposemymeds.org.          This list is accurate as of: 6/19/17  3:41 PM.  Always use your most recent med list.                   Brand Name Dispense Instructions for use    * acetaminophen 325 MG tablet    TYLENOL    100 tablet    Take 2 tablets (650 mg) by mouth every 4 hours as needed for mild pain or fever       * acetaminophen 500 MG tablet     TYLENOL    30 tablet    Take 2 tablets (1,000 mg) by mouth every 6 hours as needed for mild pain       alcohol swab prep pads     100 each    Use to swab area of injection/mary alice as directed 3 x per day       ascorbic acid 500 MG Tabs     2 tablet    Take 1 tablet (500 mg) by mouth daily       aspirin 81 MG EC tablet     90 tablet    Take 1 tablet (81 mg) by mouth daily       ASPIRIN NOT PRESCRIBED    INTENTIONAL    0 each    Please choose reason not prescribed, below       atorvastatin 40 MG tablet    LIPITOR    60 tablet    1 tablet (40 mg) by Oral or Feeding Tube route daily       beta carotene 41701 UNIT capsule     2 capsule    Take 1 capsule (25,000 Units) by mouth daily       blood glucose monitoring test strip    no brand specified    100 strip    Use to test blood sugar 3 times daily or as directed.       bumetanide 0.5 MG tablet    BUMEX    120 tablet    Take 1 tablet (0.5 mg) by mouth 2 times daily       cetirizine 10 MG tablet    zyrTEC    60 tablet    Take 1 tablet (10 mg) by mouth daily       clopidogrel 75 MG tablet    PLAVIX    60 tablet    Take 1 tablet (75 mg) by mouth daily       digoxin 125 MCG tablet    LANOXIN    60 tablet    Take 1 tablet (125 mcg) by mouth daily       finasteride 5 MG tablet    PROSCAR    60 tablet    Take 1 tablet (5 mg) by mouth daily       insulin glargine 100 UNIT/ML injection    LANTUS    9 mL    Inject 25 Units Subcutaneous every morning (before breakfast)       insulin pen needle 32G X 4 MM    BD KYLEE U/F    100 each    Use 3 daily or as directed.       lisinopril 2.5 MG tablet    PRINIVIL/Zestril    120 tablet    Take 1 tablet (2.5 mg) by mouth 2 times daily       methylPREDNISolone 4 MG tablet    MEDROL DOSEPAK    21 tablet    Follow package instructions       metoprolol 25 MG 24 hr tablet    TOPROL-XL    45 tablet    Take 0.5 tablets (12.5 mg) by mouth daily       pantoprazole 40 MG EC tablet    PROTONIX    60 tablet    Take 1 tablet (40 mg) by mouth daily        potassium chloride SA 20 MEQ CR tablet    K-DUR/KLOR-CON M    120 tablet    Take 1 tablet (20 mEq) by mouth 2 times daily       Sharps Container Misc     1 each    1 each every 30 days       spironolactone 25 MG tablet    ALDACTONE    30 tablet    Take 0.5 tablets (12.5 mg) by mouth daily       tamsulosin 0.4 MG capsule    FLOMAX    60 capsule    Take 1 capsule (0.4 mg) by mouth daily       * Warfarin Therapy Reminder      1 each continuous prn       * warfarin 3 MG tablet    COUMADIN    30 tablet    Take 1 tablet (3 mg) by mouth daily       zinc sulfate 220 (50 ZN) MG capsule    ZINCATE    2 capsule    Take 1 capsule (220 mg) by mouth daily       * Notice:  This list has 4 medication(s) that are the same as other medications prescribed for you. Read the directions carefully, and ask your doctor or other care provider to review them with you.

## 2017-06-19 NOTE — NURSING NOTE
"Chief Complaint   Patient presents with     Musculoskeletal Problem     leg pain, sore, tingiling, unable to sleep 1x month        Initial /70 (BP Location: Left arm, Patient Position: Chair, Cuff Size: Adult Regular)  Pulse 113  Temp 98.8  F (37.1  C) (Oral)  Wt 119 lb 14.4 oz (54.4 kg)  SpO2 99%  BMI 21.93 kg/m2 Estimated body mass index is 21.93 kg/(m^2) as calculated from the following:    Height as of 6/8/17: 5' 2\" (1.575 m).    Weight as of this encounter: 119 lb 14.4 oz (54.4 kg).  Medication Reconciliation: complete    "

## 2017-06-19 NOTE — PROGRESS NOTES
SUBJECTIVE:  Chief Complaint   Patient presents with     Musculoskeletal Problem     leg pain, sore, tingiling, unable to sleep 1x month      Luke Henao is a 51 year old male presents with a chief complaint of bilateral lower extremity tenderness, aching and shakiness at times .  How: walking around a lot .  The patient complained of mild pain  and has not had decreased ROM.  Pain exacerbated by walking and weight-bearing.  Relieved by nothing.  He treated it initially with no therapy. This is the first time this type of injury has occurred to this patient.     Past Medical History:   Diagnosis Date     History of thrombophlebitis      No Known Allergies  Social History   Substance Use Topics     Smoking status: Former Smoker     Packs/day: 0.50     Types: Cigarettes     Smokeless tobacco: Not on file     Alcohol use No       ROS:  CONSTITUTIONAL:NEGATIVE for fever, chills, change in weight  INTEGUMENTARY/SKIN: NEGATIVE for worrisome rashes, moles or lesions  RESP:NEGATIVE for significant cough or SOB  CV: NEGATIVE for chest pain, palpitations or peripheral edema  MUSCULOSKELETAL: POSITIVE  for aching in lower legs, tenderness anterior legs  NEURO: NEGATIVE for weakness, dizziness or paresthesias    EXAM:   /70 (BP Location: Left arm, Patient Position: Chair, Cuff Size: Adult Regular)  Pulse 113  Temp 98.8  F (37.1  C) (Oral)  Wt 119 lb 14.4 oz (54.4 kg)  SpO2 99%  BMI 21.93 kg/m2  Gen: healthy,alert,no distress  Extremity: lower legs has anterior thigh tenderness.   There is not compromise to the distal circulation.  Pulses are +2 and CRT is brisk  GENERAL APPEARANCE: healthy, alert and no distress  EXTREMITIES: peripheral pulses normal  MS:  Positive for anterior thigh tenderness, pain   SKIN: no suspicious lesions or rashes  NEURO: Normal strength and tone, sensory exam grossly normal, mentation intact and speech normal    Results for orders placed or performed in visit on 06/19/17   CK total   Result  Value Ref Range    CK Total 79 30 - 300 U/L   Erythrocyte sedimentation rate auto   Result Value Ref Range    Sed Rate 40 (H) 0 - 15 mm/h   CBC with platelets differential   Result Value Ref Range    WBC 7.8 4.0 - 11.0 10e9/L    RBC Count 4.05 (L) 4.4 - 5.9 10e12/L    Hemoglobin 12.0 (L) 13.3 - 17.7 g/dL    Hematocrit 37.0 (L) 40.0 - 53.0 %    MCV 91 78 - 100 fl    MCH 29.6 26.5 - 33.0 pg    MCHC 32.4 31.5 - 36.5 g/dL    RDW 14.9 10.0 - 15.0 %    Platelet Count 270 150 - 450 10e9/L    Diff Method Automated Method     % Neutrophils 47.6 %    % Lymphocytes 29.5 %    % Monocytes 9.7 %    % Eosinophils 12.3 %    % Basophils 0.9 %    Absolute Neutrophil 3.7 1.6 - 8.3 10e9/L    Absolute Lymphocytes 2.3 0.8 - 5.3 10e9/L    Absolute Monocytes 0.8 0.0 - 1.3 10e9/L    Absolute Eosinophils 1.0 (H) 0.0 - 0.7 10e9/L    Absolute Basophils 0.1 0.0 - 0.2 10e9/L   Comprehensive metabolic panel   Result Value Ref Range    Sodium 137 133 - 144 mmol/L    Potassium 4.2 3.4 - 5.3 mmol/L    Chloride 100 94 - 109 mmol/L    Carbon Dioxide 28 20 - 32 mmol/L    Anion Gap 9 3 - 14 mmol/L    Glucose 201 (H) 70 - 99 mg/dL    Urea Nitrogen 38 (H) 7 - 30 mg/dL    Creatinine 1.53 (H) 0.66 - 1.25 mg/dL    GFR Estimate 48 (L) >60 mL/min/1.7m2    GFR Estimate If Black 59 (L) >60 mL/min/1.7m2    Calcium 9.3 8.5 - 10.1 mg/dL    Bilirubin Total 0.4 0.2 - 1.3 mg/dL    Albumin 4.2 3.4 - 5.0 g/dL    Protein Total 8.3 6.8 - 8.8 g/dL    Alkaline Phosphatase 73 40 - 150 U/L    ALT 27 0 - 70 U/L    AST 21 0 - 45 U/L         ASSESSMENT/PLAN:      ICD-10-CM    1. Lower extremity pain, bilateral M79.604 CK total    M79.605 Erythrocyte sedimentation rate auto     CBC with platelets differential     Comprehensive metabolic panel     acetaminophen (TYLENOL) 500 MG tablet     methylPREDNISolone (MEDROL DOSEPAK) 4 MG tablet   2. Shakiness R25.1 CK total     Erythrocyte sedimentation rate auto     CBC with platelets differential     Comprehensive metabolic panel      acetaminophen (TYLENOL) 500 MG tablet   3. Elevated serum creatinine R79.89    4. Elevated erythrocyte sedimentation rate R70.0 methylPREDNISolone (MEDROL DOSEPAK) 4 MG tablet       Patient scheduled to follow up with PCP

## 2017-06-19 NOTE — TELEPHONE ENCOUNTER
We have tried to reach patient 3 times regarding this. Has [facility] appointment tomorrow. Could he be informed of this message then? Unsure where his pain is.

## 2017-06-20 ENCOUNTER — ANTICOAGULATION THERAPY VISIT (OUTPATIENT)
Dept: ANTICOAGULATION | Facility: CLINIC | Age: 52
End: 2017-06-20
Payer: COMMERCIAL

## 2017-06-20 DIAGNOSIS — I82.402 DEEP VEIN THROMBOSIS (DVT) OF LEFT LOWER EXTREMITY, UNSPECIFIED CHRONICITY, UNSPECIFIED VEIN (H): ICD-10-CM

## 2017-06-20 DIAGNOSIS — I63.30 CEREBROVASCULAR ACCIDENT (CVA) DUE TO THROMBOSIS OF CEREBRAL ARTERY (H): ICD-10-CM

## 2017-06-20 LAB — INR POINT OF CARE: 2.9 (ref 0.86–1.14)

## 2017-06-20 PROCEDURE — 36416 COLLJ CAPILLARY BLOOD SPEC: CPT

## 2017-06-20 PROCEDURE — 85610 PROTHROMBIN TIME: CPT | Mod: QW

## 2017-06-20 PROCEDURE — 99207 ZZC NO CHARGE NURSE ONLY: CPT

## 2017-06-20 RX ORDER — WARFARIN SODIUM 3 MG/1
TABLET ORAL
Qty: 100 TABLET | Refills: 0 | Status: SHIPPED | OUTPATIENT
Start: 2017-06-20 | End: 2017-07-19

## 2017-06-20 NOTE — TELEPHONE ENCOUNTER
Spoke with pt during appointment and he was seen in UC today and given meds.  He will follow up with Dr. Brown if symptoms do not resolve.

## 2017-06-20 NOTE — MR AVS SNAPSHOT
Luke Henao   6/20/2017 12:00 PM   Anticoagulation Therapy Visit    Description:  51 year old male   Provider:  [facility] [employer];  ANTICOAGULATION CLINIC   Department:   Anti Coagulation           INR as of 6/20/2017     Today's INR 2.9      Anticoagulation Summary as of 6/20/2017     INR goal 2.0-3.0   Today's INR 2.9   Full instructions 3 mg every day   Next INR check 6/27/2017    Indications   Stroke (H) [I63.9]         Your next Anticoagulation Clinic appointment(s)     Jun 27, 2017 12:15 PM CDT   Anticoagulation Visit with  ANTICOAGULATION CLINIC   [facility] ([facility])    [address] [address] [phone]              Contact Numbers     [facility]  Please call  [phone] to cancel and/or reschedule your appointment   Please call  [phone] with any problems or questions regarding your therapy.        June 2017 Details    Sun Mon Tue Wed Thu Fri Sat         1               2               3                 4               5               6               7               8               9               10                 11               12               13               14               15               16               17                 18               19               20      3 mg   See details      21      3 mg         22      3 mg         23      3 mg         24      3 mg           25      3 mg         26      3 mg         27            28               29               30                 Date Details   06/20 This INR check       Date of next INR:  6/27/2017         How to take your warfarin dose     To take:  3 mg Take 1 of the 3 mg tablets.

## 2017-06-21 LAB
MICRO REPORT STATUS: NORMAL
MYCOBACTERIUM SPEC CULT: NORMAL
SPECIMEN SOURCE: NORMAL

## 2017-06-21 NOTE — OP NOTE
DATE OF SURGERY:  05/08/2017      PREOPERATIVE DIAGNOSES:   1.  Status post trans-subclavian artery intra-aortic balloon pump implantation.   2.  Status post right subclavian artery vascular graft anastomosis as a conduit for the right subclavian intra-aortic balloon pump.   3.  Improved cardiac function and reduced mitral regurgitation after MitraClip procedure.   4.  Improved hemodynamics to the point that the intra-aortic balloon pump can be removed and that the right subclavian artery vascular graft can be removed as well.      POSTOPERATIVE DIAGNOSES:   1.  Status post trans-subclavian artery intra-aortic balloon pump implantation.   2.  Status post right subclavian artery vascular graft anastomosis as a conduit for the right subclavian intra-aortic balloon pump.   3.  Improved cardiac function and reduced mitral regurgitation after MitraClip procedure.   4.  Improved hemodynamics to the point that the intra-aortic balloon pump can be removed and that the right subclavian artery vascular graft can be removed as well.      SURGICAL PROCEDURES PERFORMED:   1.  Removal of the right subclavian intra-aortic balloon pump.   2.  Resection and removal of the vascular graft from the right subclavian artery.      SURGEON:  Keshav Leung MD      1ST ASSISTANT:  Chanell Sin PA-C     Please note that there is no cardiac surgery available so Chanell Sin is the first assistant.      INDICATION FOR SURGERY:  Luke Henao is a 51-year-old gentleman who has ischemic cardiomyopathy and had a myocardial infarction recently.  He developed cardiogenic shock requiring intra-aortic balloon pump support.  He failed to be weaned from transfemoral intra-aortic balloon pump and required relatively long time support of intra-aortic balloon pump.  In order to support the patient adequately with intra-aortic balloon pump, we placed a right subclavian intra-aortic balloon pump.  We also sewed a vascular graft onto the right subclavian  artery as an inflow conduit graft so that the intra-aortic balloon pump can be placed.  The patient is being supported for over 4 weeks and then the MitraClip procedure was performed.  The patient's mitral regurgitation is significantly reduced.  His cardiac function improved as well to the point that transsubclavian intra-aortic balloon pump can be removed and the vascular graft can also be removed.  The benefits and risks of surgery were explained to the patient and the patient's family and the consent was obtained.      SURGICAL PROCEDURE:  The patient was brought to the operating room.  He was intubated.  General anesthesia was given.  His chest and abdomen were prepped and draped in sterile fashion.  We first turned off the intra-aortic balloon pump.  The patient's hemodynamics remained stable.  We deflated the intra-aortic balloon pump.  We pulled out the intra-aortic balloon pump catheter from the right subclavian artery and the removal was smooth.  The patient tolerated the procedure well without balloon pump.      We then made a cut-down incision over the right subclavicular area where the old incision was made.  We opened the skin and subcutaneous tissue and dissected out the Gelweave vascular graft that was sewed to the subclavian artery which was used as the access vessel for the intra-aortic balloon pump insertion through the right subclavian artery.  We clamped the base of the Gelweave vascular graft near the anastomosis with a right angle clamp.  We resected the Gelweave vascular graft about 2 mm above the anastomosis.  We used a pair of 4-0 Prolene sutures to close the stump of the vascular graft in a continuous running suture fashion.  We then tied the suture and released the right angle clamp.  The hemostasis was satisfactory.  The right subclavian artery flow was adequate.  We irrigated the wound and closed the fascial and the skin layer in multiple layers of Vicryl sutures.  The hemostasis is  satisfactory.  The whole procedure is uneventful.         ALVARO RODRIGUEZ MD             D: 2017 19:59   T: 2017 20:42   MT:       Name:     SYDNIE SIERRA   MRN:      [id number]        Account:        [id number]   :      1965           Procedure Date: 2017      Document: [id number]       cc: [facility]

## 2017-06-22 LAB
MICRO REPORT STATUS: NORMAL
MYCOBACTERIUM SPEC CULT: NORMAL
SPECIMEN SOURCE: NORMAL

## 2017-06-23 ENCOUNTER — HOSPITAL ENCOUNTER (OUTPATIENT)
Dept: CARDIAC REHAB | Facility: CLINIC | Age: 52
End: 2017-06-23
Attending: PHYSICAL MEDICINE & REHABILITATION
Payer: COMMERCIAL

## 2017-06-23 ENCOUNTER — OFFICE VISIT (OUTPATIENT)
Dept: INTERNAL MEDICINE | Facility: CLINIC | Age: 52
End: 2017-06-23
Payer: COMMERCIAL

## 2017-06-23 VITALS
OXYGEN SATURATION: 100 % | SYSTOLIC BLOOD PRESSURE: 100 MMHG | HEIGHT: 62 IN | DIASTOLIC BLOOD PRESSURE: 60 MMHG | WEIGHT: 119 LBS | HEART RATE: 117 BPM | BODY MASS INDEX: 21.9 KG/M2 | TEMPERATURE: 98.1 F

## 2017-06-23 DIAGNOSIS — R79.89 ELEVATED SERUM CREATININE: ICD-10-CM

## 2017-06-23 DIAGNOSIS — G25.9 ABNORMAL LEG MOVEMENT: ICD-10-CM

## 2017-06-23 DIAGNOSIS — D64.9 NORMOCYTIC ANEMIA: ICD-10-CM

## 2017-06-23 DIAGNOSIS — M79.604 LEG PAIN, BILATERAL: Primary | ICD-10-CM

## 2017-06-23 DIAGNOSIS — M79.605 LEG PAIN, BILATERAL: Primary | ICD-10-CM

## 2017-06-23 LAB
ALBUMIN SERPL-MCNC: 4.3 G/DL (ref 3.4–5)
ALP SERPL-CCNC: 65 U/L (ref 40–150)
ALT SERPL W P-5'-P-CCNC: 24 U/L (ref 0–70)
ANION GAP SERPL CALCULATED.3IONS-SCNC: 14 MMOL/L (ref 3–14)
AST SERPL W P-5'-P-CCNC: 16 U/L (ref 0–45)
BILIRUB SERPL-MCNC: 0.4 MG/DL (ref 0.2–1.3)
BUN SERPL-MCNC: 66 MG/DL (ref 7–30)
CALCIUM SERPL-MCNC: 9.2 MG/DL (ref 8.5–10.1)
CHLORIDE SERPL-SCNC: 99 MMOL/L (ref 94–109)
CO2 SERPL-SCNC: 22 MMOL/L (ref 20–32)
CREAT SERPL-MCNC: 1.48 MG/DL (ref 0.66–1.25)
ERYTHROCYTE [DISTWIDTH] IN BLOOD BY AUTOMATED COUNT: 14.9 % (ref 10–15)
FERRITIN SERPL-MCNC: 272 NG/ML (ref 26–388)
GFR SERPL CREATININE-BSD FRML MDRD: 50 ML/MIN/1.7M2
GLUCOSE SERPL-MCNC: 247 MG/DL (ref 70–99)
HCT VFR BLD AUTO: 39.8 % (ref 40–53)
HGB BLD-MCNC: 13 G/DL (ref 13.3–17.7)
IRON SATN MFR SERPL: 30 % (ref 15–46)
IRON SERPL-MCNC: 114 UG/DL (ref 35–180)
MAGNESIUM SERPL-MCNC: 2.4 MG/DL (ref 1.6–2.3)
MCH RBC QN AUTO: 29.5 PG (ref 26.5–33)
MCHC RBC AUTO-ENTMCNC: 32.7 G/DL (ref 31.5–36.5)
MCV RBC AUTO: 90 FL (ref 78–100)
MICRO REPORT STATUS: NORMAL
MYCOBACTERIUM SPEC CULT: NORMAL
PHOSPHATE SERPL-MCNC: 4.7 MG/DL (ref 2.5–4.5)
PLATELET # BLD AUTO: 318 10E9/L (ref 150–450)
POTASSIUM SERPL-SCNC: 4.5 MMOL/L (ref 3.4–5.3)
PROT SERPL-MCNC: 8.8 G/DL (ref 6.8–8.8)
RBC # BLD AUTO: 4.41 10E12/L (ref 4.4–5.9)
SODIUM SERPL-SCNC: 135 MMOL/L (ref 133–144)
SPECIMEN SOURCE: NORMAL
TIBC SERPL-MCNC: 376 UG/DL (ref 240–430)
WBC # BLD AUTO: 12.4 10E9/L (ref 4–11)

## 2017-06-23 PROCEDURE — 82728 ASSAY OF FERRITIN: CPT | Performed by: INTERNAL MEDICINE

## 2017-06-23 PROCEDURE — 83540 ASSAY OF IRON: CPT | Performed by: INTERNAL MEDICINE

## 2017-06-23 PROCEDURE — 83550 IRON BINDING TEST: CPT | Performed by: INTERNAL MEDICINE

## 2017-06-23 PROCEDURE — 93798 PHYS/QHP OP CAR RHAB W/ECG: CPT | Performed by: OCCUPATIONAL THERAPIST

## 2017-06-23 PROCEDURE — 36415 COLL VENOUS BLD VENIPUNCTURE: CPT | Performed by: INTERNAL MEDICINE

## 2017-06-23 PROCEDURE — 80053 COMPREHEN METABOLIC PANEL: CPT | Performed by: INTERNAL MEDICINE

## 2017-06-23 PROCEDURE — 40000116 ZZH STATISTIC OP CR VISIT: Performed by: OCCUPATIONAL THERAPIST

## 2017-06-23 PROCEDURE — 99214 OFFICE O/P EST MOD 30 MIN: CPT | Performed by: INTERNAL MEDICINE

## 2017-06-23 PROCEDURE — 85027 COMPLETE CBC AUTOMATED: CPT | Performed by: INTERNAL MEDICINE

## 2017-06-23 PROCEDURE — 83735 ASSAY OF MAGNESIUM: CPT | Performed by: INTERNAL MEDICINE

## 2017-06-23 PROCEDURE — 84100 ASSAY OF PHOSPHORUS: CPT | Performed by: INTERNAL MEDICINE

## 2017-06-23 NOTE — NURSING NOTE
"Chief Complaint   Patient presents with     ER F/U     6/19/17 BLUC for Bilateral leg pain, now its better.       Initial /60 (BP Location: Left arm, Patient Position: Chair, Cuff Size: Adult Regular)  Pulse 117  Temp 98.1  F (36.7  C) (Oral)  Ht 5' 2\" (1.575 m)  Wt 119 lb (54 kg)  SpO2 100%  BMI 21.77 kg/m2 Estimated body mass index is 21.77 kg/(m^2) as calculated from the following:    Height as of this encounter: 5' 2\" (1.575 m).    Weight as of this encounter: 119 lb (54 kg).  Medication Reconciliation: complete     Kaminibose MA      "

## 2017-06-23 NOTE — MR AVS SNAPSHOT
After Visit Summary   6/23/2017    Luke Henao    MRN: [id number]           Patient Information     Date Of Birth          1965        Visit Information        Provider Department      6/23/2017 12:45 PM Shanna Church MD; [facility] [employer] [facility]        Today's Diagnoses     Leg pain, bilateral    -  1    Restless legs syndrome          Care Instructions    Labs today.    ---    Will likely call on Monday for follow-up and results.           Follow-ups after your visit        Your next 10 appointments already scheduled     Jun 26, 2017  7:45 AM CDT   Cardiac Treatment with  Cardiac Rehab 1   [facility] ([facility])    [address], [address]  [facility] [address]   [phone]            Jun 27, 2017  9:00 AM CDT   Neuro Treatment with Mariangel Hayward, SLP   [facility] ([facility])    [address]  [address]  [facility] [address]   [phone]            Jun 27, 2017  9:45 AM CDT   Neuro Treatment with Viki Cole OT   [facility] ([facility])    [address]  [address]  [facility] [address]   [phone]            Jun 27, 2017 12:00 PM CDT   Anticoagulation Visit with [facility] ANT[facility]AGULATION CLINIC   [facility] ([facility]    [address]  [facility] [address]   [phone]            Jun 29, 2017  8:45 AM CDT   Neuro Treatment with Lilian Mock PT   [facility] ([facility])    [address]  [address]  [facility] [address]   [phone]            Jun 30, 2017  8:00 AM CDT   Cardiac Treatment with  Cardiac Rehab 1   [facility] ([facility])    [address]  [facility] [address]   [phone]            Jul 03, 2017  8:00 AM CDT   Neuro Treatment with Lilian DUBOIS  "Nish, PT   [facility] ([facility])    [address]  [address]  [facility] [address]   [phone]            Jul 03, 2017  8:30 AM CDT   Neuro Treatment with Mariangel Hayward, SLP   [facility] ([facility])    [address]  [address]  [facility] [address]   [phone]            Jul 03, 2017  9:30 AM CDT   Neuro Treatment with Jeanne Anderson OT   [facility] ([facility])    [address]  [address]  [facility] [address]   [phone]            Jul 07, 2017  9:00 AM CDT   CONSULT with  Cardiac Rehab 2   [facility] ([facility])    [address]  [facility] [address]   [phone]              Who to contact     If you have questions or need follow up information about today's clinic visit or your schedule please contact [facility] directly at [phone].  Normal or non-critical lab and imaging results will be communicated to you by MyChart, letter or phone within 4 business days after the clinic has received the results. If you do not hear from us within 7 days, please contact the clinic through [facility] or phone. If you have a critical or abnormal lab result, we will notify you by phone as soon as possible.  Submit refill requests through [facility] or call your pharmacy and they will forward the refill request to us. Please allow 3 business days for your refill to be completed.          Additional Information About Your Visit        [facility] Information     [facility] lets you send messages to your doctor, view your test results, renew your prescriptions, schedule appointments and more. To sign up, go to [email or web address] . Click on \"Log in\" on the left side of the screen, which will take you to the Welcome page. Then click on \"Sign up Now\" on the right side of the page.     You will be asked to enter the access " "code listed below, as well as some personal information. Please follow the directions to create your username and password.     Your access code is: [email or web address]  Expires: 2017 12:15 PM     Your access code will  in 90 days. If you need help or a new code, please call your [facility] clinic or [phone].        Care EveryWhere ID     This is your Care EveryWhere ID. This could be used by other organizations to access your [facility] medical records  [id number]        Your Vitals Were     Pulse Temperature Height Pulse Oximetry BMI (Body Mass Index)       117 98.1  F (36.7  C) (Oral) 5' 2\" (1.575 m) 100% 21.77 kg/m2        Blood Pressure from Last 3 Encounters:   17 100/60   17 106/70   17 99/70    Weight from Last 3 Encounters:   17 119 lb (54 kg)   17 119 lb 14.4 oz (54.4 kg)   17 122 lb 8 oz (55.6 kg)              We Performed the Following     CBC with platelets     Comprehensive metabolic panel     Ferritin     Iron and iron binding capacity     Magnesium     Phosphorus        Primary Care Provider    Physician No Ref-Primary       No address on file        Equal Access to Services     CUBA AYALA : Hadii linda powerso Margoth, waaxda lumariellaadaha, qaybta kaalmada adejoyada, karen nettles . So [facility] [phone].    ATENCIÓN: Si habla español, tiene a suarez disposición servicios gratuitos de asistencia lingüística. Llame al [phone].    We comply with applicable federal civil rights laws and [place] laws. We do not discriminate on the basis of race, color, national origin, age, disability sex, sexual orientation or gender identity.            Thank you!     Thank you for choosing [facility][1]  for your care. Our goal is always to provide you with excellent care. Hearing back from our patients is one way we can continue to improve our services. Please take a few minutes to complete the written survey that you may " receive in the mail after your visit with us. Thank you!             Your Updated Medication List - Protect others around you: Learn how to safely use, store and throw away your medicines at www.disposemymeds.org.          This list is accurate as of: 6/23/17  1:32 PM.  Always use your most recent med list.                   Brand Name Dispense Instructions for use Diagnosis    * acetaminophen 325 MG tablet    TYLENOL    100 tablet    Take 2 tablets (650 mg) by mouth every 4 hours as needed for mild pain or fever    Coronary artery disease involving native coronary artery of native heart without angina pectoris       * acetaminophen 500 MG tablet    TYLENOL    30 tablet    Take 2 tablets (1,000 mg) by mouth every 6 hours as needed for mild pain    Lower extremity pain, bilateral, Shakiness       alcohol swab prep pads     100 each    Use to swab area of injection/mary alice as directed 3 x per day    Diabetes mellitus type 2, insulin dependent (H)       ascorbic acid 500 MG Tabs     2 tablet    Take 1 tablet (500 mg) by mouth daily    Coronary artery disease involving native coronary artery of native heart without angina pectoris       aspirin 81 MG EC tablet     90 tablet    Take 1 tablet (81 mg) by mouth daily    Cardiogenic shock (H), Type 2 diabetes mellitus without complication, with long-term current use of insulin (H)       ASPIRIN NOT PRESCRIBED    INTENTIONAL    0 each    Please choose reason not prescribed, below    Cardiogenic shock (H), Type 2 diabetes mellitus without complication, with long-term current use of insulin (H)       atorvastatin 40 MG tablet    LIPITOR    60 tablet    1 tablet (40 mg) by Oral or Feeding Tube route daily    Coronary artery disease involving native coronary artery of native heart without angina pectoris, Cardiogenic shock (H)       beta carotene 04620 UNIT capsule     2 capsule    Take 1 capsule (25,000 Units) by mouth daily    Ischemic cardiomyopathy       blood glucose  monitoring test strip    no brand specified    100 strip    Use to test blood sugar 3 times daily or as directed.    Diabetes mellitus type 2, insulin dependent (H)       bumetanide 0.5 MG tablet    BUMEX    120 tablet    Take 1 tablet (0.5 mg) by mouth 2 times daily    Ischemic cardiomyopathy       cetirizine 10 MG tablet    zyrTEC    60 tablet    Take 1 tablet (10 mg) by mouth daily    Seasonal allergic rhinitis, unspecified allergic rhinitis trigger       clopidogrel 75 MG tablet    PLAVIX    60 tablet    Take 1 tablet (75 mg) by mouth daily    ST elevation myocardial infarction (STEMI), unspecified artery (H)       digoxin 125 MCG tablet    LANOXIN    60 tablet    Take 1 tablet (125 mcg) by mouth daily    Coronary artery disease involving native coronary artery of native heart without angina pectoris, Cardiogenic shock (H)       finasteride 5 MG tablet    PROSCAR    60 tablet    Take 1 tablet (5 mg) by mouth daily    Benign prostatic hyperplasia with lower urinary tract symptoms, unspecified morphology       insulin glargine 100 UNIT/ML injection    LANTUS    9 mL    Inject 25 Units Subcutaneous every morning (before breakfast)    Diabetes mellitus type 2, insulin dependent (H)       insulin pen needle 32G X 4 MM    BD KYLEE U/F    100 each    Use 3 daily or as directed.    Diabetes mellitus type 2, insulin dependent (H)       lisinopril 2.5 MG tablet    PRINIVIL/Zestril    120 tablet    Take 1 tablet (2.5 mg) by mouth 2 times daily    Coronary artery disease involving native coronary artery of native heart without angina pectoris       methylPREDNISolone 4 MG tablet    MEDROL DOSEPAK    21 tablet    Follow package instructions    Lower extremity pain, bilateral, Elevated erythrocyte sedimentation rate       metoprolol 25 MG 24 hr tablet    TOPROL-XL    45 tablet    Take 0.5 tablets (12.5 mg) by mouth daily    Mitral valve insufficiency, unspecified etiology       pantoprazole 40 MG EC tablet    PROTONIX    60  tablet    Take 1 tablet (40 mg) by mouth daily    Gastrointestinal hemorrhage associated with other gastritis       potassium chloride SA 20 MEQ CR tablet    K-DUR/KLOR-CON M    120 tablet    Take 1 tablet (20 mEq) by mouth 2 times daily    Ischemic cardiomyopathy       Sharps Container Misc     1 each    1 each every 30 days    Diabetes mellitus type 2, insulin dependent (H)       spironolactone 25 MG tablet    ALDACTONE    30 tablet    Take 0.5 tablets (12.5 mg) by mouth daily    Coronary artery disease involving native coronary artery of native heart without angina pectoris       tamsulosin 0.4 MG capsule    FLOMAX    60 capsule    Take 1 capsule (0.4 mg) by mouth daily    Ischemic cardiomyopathy       * Warfarin Therapy Reminder      1 each continuous prn    Deep vein thrombosis (DVT) of left lower extremity, unspecified chronicity, unspecified vein (H)       * warfarin 3 MG tablet    COUMADIN    100 tablet    Take one tablet daily as directed by the [facility]    Deep vein thrombosis (DVT) of left lower extremity, unspecified chronicity, unspecified vein (H)       zinc sulfate 220 (50 ZN) MG capsule    ZINCATE    2 capsule    Take 1 capsule (220 mg) by mouth daily    Ischemic cardiomyopathy       * Notice:  This list has 4 medication(s) that are the same as other medications prescribed for you. Read the directions carefully, and ask your doctor or other care provider to review them with you.

## 2017-06-23 NOTE — PROGRESS NOTES
"  SUBJECTIVE:                                                      HPI: Luke Henao is a pleasant 51 year old male who presents for follow-up of leg pain:    Accompanied by wife.    Chadian  utilized.    - was seen in  last week for bilateral leg tenderness and aching and shakiness  - symptoms ongoing for ~1 month  - no precipitating trauma or unusual exertion, BUT was discharged from hospital ~1 month ago (see PMH below).  - pain worse with weight-bearing and walking  - labs within normal limits other than worsening kidney function  (GFR 75-->48, Cr. 1.05 --> 1.53) and mild normocytic anemia (12.0)  - prescribed Tylenol and Medrol dose pack    Update since then:  - patient reports significantly improved, but continued symptoms  - tenderness and aching involves entire leg, from hips to toes, bilaterally and symmetrically   - leg shaking is worse when sleeping or lying down  - has noticed increased BGs since starting Medrol dose pack (IDDM)    PMH significant for hospitalization 3/26/17 - 5/12/17:  - presented with RCA STEMI  - underwent balloon angioplasty  - transferred to  for management of cardiogenic shock  - underwent multiple Sofie to RCA, LCx, and LAD  - refractory cardiogenic shock requiring IABP, eventually requiring subclavian balloon pump  - refractory shock eventually attributed to ischemic MR  - patient treated with percutaneous MV repair (5/1/17) --> clinical improvement/weaned off of balloon pump  - hospitalization also significant for sepsis, multiple episodes of PEDRO, sacral ulcer, and DVT  - discharged to inpatient rehab (5/12/17-5/26/17)  - currently enrolled in cardiac rehab    The medication, allergy, and problem lists have been reviewed and updated as appropriate.       OBJECTIVE:                                                      /60 (BP Location: Left arm, Patient Position: Chair, Cuff Size: Adult Regular)  Pulse 117  Temp 98.1  F (36.7  C) (Oral)  Ht 5' 2\" (1.575 m) "  Wt 119 lb (54 kg)  SpO2 100%  BMI 21.77 kg/m2  Constitutional: well-appearing  Respiratory: normal respiratory effort; clear to auscultation bilaterally  Cardiovascular: regular rate and rhythm; no edema  Gastrointestinal: soft, non-tender, non-distended, and bowel sounds present; no organomegaly or masses   Musculoskeletal: normal gait and station; both legs with minimal muscle mass; both legs mildly tender throughout; no redness, swelling, or skin changes/lesions  Psych: normal judgment and insight; normal mood and affect; recent and remote memory intact      ASSESSMENT/PLAN:                                                      (M79.604,  M79.605) Leg pain, bilateral  (primary encounter diagnosis)  (G25.9) Abnormal leg movement  (R79.89) Elevated serum creatinine  (D64.9) Normocytic anemia  Comment:    - etiology unclear - no obvious precipitating incident or abnormality on exam.   - suspect muscle strain from rehab/leg use following prolonged hospitalization with significant immobilizations.    - labs generally unremarkable other than a mild normocytic anemia and worsening renal function.    - these may be contributing to symptoms, but are more likely incidental findings.    - physical exam generally unremarkable other than generalized atrophy of LE muscles.   Plan:    - CBC and iron studies today to rule out iron deficiency anemia.   - CMP today to follow-up on kidney function.   - recommend discussing symptoms with cardiac rehab.    - they may be able to show him stretches and exercises to help loosen and strengthen legs.    The instructions on the AVS were discussed and explained to the patient. Patient expressed understanding of instructions.    A total of 25 minutes were spent face-to-face with this patient during this encounter and over half of that time was spent on counseling and coordination of care re: above diagnoses and plans of care.     Shanan Church MD   [facility] -  [address] [place] [address]  T: [phone], F: [phone]

## 2017-06-25 DIAGNOSIS — R79.89 ELEVATED SERUM CREATININE: Primary | ICD-10-CM

## 2017-06-26 ENCOUNTER — HOSPITAL ENCOUNTER (OUTPATIENT)
Dept: CARDIAC REHAB | Facility: CLINIC | Age: 52
End: 2017-06-26
Attending: PHYSICAL MEDICINE & REHABILITATION
Payer: COMMERCIAL

## 2017-06-26 PROCEDURE — 40000116 ZZH STATISTIC OP CR VISIT: Performed by: OCCUPATIONAL THERAPIST

## 2017-06-26 PROCEDURE — 93798 PHYS/QHP OP CAR RHAB W/ECG: CPT | Performed by: OCCUPATIONAL THERAPIST

## 2017-06-27 ENCOUNTER — TELEPHONE (OUTPATIENT)
Dept: INTERNAL MEDICINE | Facility: CLINIC | Age: 52
End: 2017-06-27

## 2017-06-27 ENCOUNTER — ANTICOAGULATION THERAPY VISIT (OUTPATIENT)
Dept: ANTICOAGULATION | Facility: CLINIC | Age: 52
End: 2017-06-27
Payer: COMMERCIAL

## 2017-06-27 VITALS — HEART RATE: 109 BPM | DIASTOLIC BLOOD PRESSURE: 47 MMHG | SYSTOLIC BLOOD PRESSURE: 69 MMHG

## 2017-06-27 LAB — INR POINT OF CARE: 5.4 (ref 0.86–1.14)

## 2017-06-27 PROCEDURE — 85610 PROTHROMBIN TIME: CPT | Mod: QW

## 2017-06-27 PROCEDURE — 36416 COLLJ CAPILLARY BLOOD SPEC: CPT

## 2017-06-27 PROCEDURE — 99207 ZZC NO CHARGE NURSE ONLY: CPT

## 2017-06-27 NOTE — TELEPHONE ENCOUNTER
Would decrease frequency of Bumex 0.5mg to once daily (from twice daily).    Would decrease frequency of lisinopril 2.5mg to once daily (from twice daily).     Would recommend follow-up with me or Dr. Cortez if possible in next couple of days.

## 2017-06-27 NOTE — PROGRESS NOTES
"Outpatient Speech Language Pathology Discharge Note     Patient: Luke Henao  : 1965    Beginning/End Dates of Reporting Period:  17 to 2017, 1 visit    Referring Provider: Dr. Flo Chacon    Therapy Diagnosis: Moderate cognitive-linguistic impairment    Client Self Report:  Pt saying at last visit on 17, 'My brain is better. I can remember 10 years ago.\" His wife attended session and reports she feels he is better. Patient reporting he feels very fatigued. Patient later saying, 'I guess my thinking is not so clear.\" Patient left message with central scheduling several days later asking to cancel all future treatments.     Objective Measurements: n/a     Goals:  Goal Identifier Verbal Expression   Goal Description Patient will learn and demonstrate use of 2 word-finding strategies across 3/4 communication breakdowns in English or Vietnames with min cues to meet daily expressive language needs.    Target Date 17   Date Met      Progress: Not met and dismissed.     Goal Identifier Listening   Goal Description Patient will follow complex 2-3 part instructions in English or Georgian with 90% accy to meet daily comprehension needs.    Target Date 17   Date Met      Progress: Not met and dismissed.     Goal Identifier Memory   Goal Description Patient will learn and demonstrate use of 2 internal or external memory support strategies to recall new auditory or visual information for 30 mins with min assist.    Target Date 17   Date Met      Progress: Not met and dismissed.     Goal Identifier Attention   Goal Description Patient will complete a complex attention task (flexible or alternating such as completing a verbal task while sorting cards) with 90% accy and min assist to meet daily attention needs.    Target Date 17   Date Met      Progress: Not met and dismissed.     Goal Identifier Reasoning   Goal Description Patient will complete a moderately complex visual or " language-based reasoning task with use of 2 executive function strategies and min cues at 90% accy.    Target Date 08/19/17   Date Met      Progress: Not met and dismissed.      Progress Toward Goals:    Progress this reporting period: Progress very limited due to brief number of session attended (one). Patient progress also reduced by patient's lack of insight into deficits, difficulty obtaining transportation to appointments, and non-acceptance of recommendations for ongoing therapy to treat cognitive-linguistic impairments.     Plan: Discharge from therapy.    Discharge: Yes.     Reason for Discharge: Patient chooses to discontinue therapy.    Discharge Plan: Pt has failed to attend scheduled appointments. Anticipate he would benefit from further SLP. Pt is welcome to resume SLP services when he is willing and able to participate.     Mariangel Hayward MA, CCC-SLP  Speech-Language Pathology  [facility]  Phone: [phone]  Pager: [phone]

## 2017-06-27 NOTE — TELEPHONE ENCOUNTER
Pt seen by [facility] nurse today. Pt verbalized poorly poorly past 2 days with having dull  Headache, that resolves for short time when lying down and taking Tylenol, he also is feeling shaky.  At pt's OV 6/23, /60,  stated at cardiac rehab yesterday BP taken several times ranged 70's/50. Today in [facility] BP  Taken 69/47, .    Review pt's medications with , he is taking correctly.    Does pt need an adjustment in his BP meds?  He is coming in Fri 6/30 for repeat Lab as ordered.    Please advise.

## 2017-06-27 NOTE — TELEPHONE ENCOUNTER
Result note forwarded to [facility] over the past weekend.    Have we been able to convey those results to patient yet?    Thank you.

## 2017-06-27 NOTE — TELEPHONE ENCOUNTER
Pt and wife notified of lab results and scheduled for lab follow up Friday . Also aware how to take care of leg pain per MD .Raisa Fitzgerald RN

## 2017-06-27 NOTE — PROGRESS NOTES
ANTICOAGULATION FOLLOW-UP CLINIC VISIT    Patient Name:  Luke Henao  Date:  6/27/2017  Contact Type:  Face to Face    SUBJECTIVE:     Patient Findings     Positives Hospital admission (Pain in both legs, tingling, seen in UC, given Solumedrol shari 6/19/17, possilby cause of elevated INR today), Other complaints (Pt has had headache and dizziness past 2 days)           OBJECTIVE    INR Protime   Date Value Ref Range Status   06/27/2017 5.4 (A) 0.86 - 1.14 Final       ASSESSMENT / PLAN  INR assessment SUPRA    Recheck INR In: 10 DAYS    INR Location Clinic      Anticoagulation Summary as of 6/27/2017     INR goal 2.0-3.0   Today's INR 5.4!   Maintenance plan 3 mg (3 mg x 1) every day   Full instructions 6/27: Hold; 6/28: Hold; Otherwise 3 mg every day   Weekly total 21 mg   Plan last modified Shalonda Fitzgerald RN (6/13/2017)   Next INR check 7/7/2017   Target end date     Indications   Stroke (H) [I63.9]         Anticoagulation Episode Summary     INR check location     Preferred lab     Send INR reminders to  [facility]    Comments             See the Encounter Report to view Anticoagulation Flowsheet and Dosing Calendar (Go to Encounters tab in chart review, and find the Anticoagulation Therapy Visit)        Velvet Jones RN

## 2017-06-27 NOTE — MR AVS SNAPSHOT
Luke WERNER Earlene   6/27/2017 12:00 PM   Anticoagulation Therapy Visit    Description:  51 year old male   Provider:  [facility] [facility];  ANTICOAGULATION CLINIC   Department:   Anti Coagulation           INR as of 6/27/2017     Today's INR 5.4!      Anticoagulation Summary as of 6/27/2017     INR goal 2.0-3.0   Today's INR 5.4!   Full instructions 6/27: Hold; 6/28: Hold; Otherwise 3 mg every day   Next INR check 7/7/2017    Indications   Stroke (H) [I63.9]         Your next Anticoagulation Clinic appointment(s)     Jul 07, 2017 12:00 PM CDT   Anticoagulation Visit with  ANTICOAGULATION CLINIC   [facility] ([facility]    [address] [address] [phone]              Contact Numbers     [facility]  Please call  [phone] to cancel and/or reschedule your appointment   Please call  [phone] with any problems or questions regarding your therapy.        June 2017 Details    Sun Mon Tue Wed Thu Fri Sat         1               2               3                 4               5               6               7               8               9               10                 11               12               13               14               15               16               17                 18               19               20               21               22               23               24                 25               26               27      Hold   See details      28      Hold         29      3 mg         30      3 mg           Date Details   06/27 This INR check               How to take your warfarin dose     To take:  3 mg Take 1 of the 3 mg tablets.    Hold Do not take your warfarin dose. See the Details table to the right for additional instructions.                July 2017 Details    Sun Mon Tue Wed Thu Fri Sat           1      3 mg           2      3 mg         3      3 mg         4      3 mg          5      3 mg         6      3 mg         7            8                 9               10               11               12               13               14               15                 16               17               18               19               20               21               22                 23               24               25               26               27               28               29                 30               31                     Date Details   No additional details    Date of next INR:  7/7/2017         How to take your warfarin dose     To take:  3 mg Take 1 of the 3 mg tablets.

## 2017-06-27 NOTE — TELEPHONE ENCOUNTER
RN called wife and informed her of changes and to follow up with Dr. Cortez . Pt wife verbalized understanding and will translate to pt .Raisa Fitzgerald RN

## 2017-06-27 NOTE — TELEPHONE ENCOUNTER
Reason for Call:  Request for results:    Name of test or procedure: labs    Date of test of procedure: 6/30/17    Location of the test or procedure: [facility]    OK to leave the result message on voice mail or with a family member? YES    Phone number Patient can be reached at:  wife [phone]    Additional comments:     Call taken on 6/27/2017 at 1:32 PM by NAOMI LI

## 2017-06-28 NOTE — PROGRESS NOTES
Outpatient Occupational Therapy Discharge Note     Patient: Luke Henao  : 1965  Insurance:   Payor/Plan Subscriber Name Rel Member # Group #   PREFERREDONE - PEAK LUEK BARAJAS  [id number]       [id number]       Beginning/End Dates of Reporting Period:   to     Referring Provider:     Therapy Diagnosis: Cardiogenic shock, Cerebrovascular accident (CVA) due to embolism of cerebral artery    Client Self Report:   Patient had multiple no shows.  Multiple calls were made to patient but patient never returned calls.    Objective Measurements:    None-patient unable to be reassessed.    Goals:     Goal Identifier 1- Strength   Goal Description Patient to demonstrate improved B   strength to 60# for increased ADL/IADL independence (home, gardening and work tasks, etc.).   Target Date 17   Date Met      Progress:     Goal Identifier 2-Dressing   Goal Description Patient will demonstrate modified independence with LE dressing.   Target Date 17   Date Met      Progress:     Goal Identifier 3-Community Mobility   Goal Description Pt will demonstrate modified I with community mobility to ensure safety with pathfinding, crossing street and manipulating dynamic hallway safely.   Target Date 17   Date Met      Progress:     Goal Identifier 4-Medications   Goal Description Patient to demonstrate 100% accuracy on setting up 4 new moderately complex medications for safe management of his personal medications at home.   Target Date 17   Date Met      Progress:     Goal Identifier 5-FMC   Goal Description Patient to demonstrate improved  L hand FM coordination by completing the 9-Hole-Peg Test in 25 seconds with L hand for increased independence with FM ADLs (manipulating buttons, zippers, handwriting and work tasks, etc.).   Target Date 17   Date Met      Progress:       Progress Toward Goals:   Not assessed this period.    Plan:  Discharge from  therapy.    Discharge:    Reason for Discharge: Patient has failed to schedule further appointments.  Multiple no-show appointments.    Equipment Issued: Theraputty    Discharge Plan: Patient to continue home program.

## 2017-06-29 NOTE — PROGRESS NOTES
Outpatient Physical Therapy Discharge Note     Patient: Luke Henao  : 1965    Beginning/End Dates of Reporting Period:  17 to 2017    Referring Provider: Dr. Chacon    Therapy Diagnosis: difficulty with gait     Client Self Report: see eval      Goals:  Goal Identifier GAIT WITHOUT AD   Goal Description 1. Patient will ambulate over a variety of surfaces in the community independently, without an assistive device.   Target Date 17   Date Met      Progress:     Goal Identifier FGA   Goal Description 2. Patient will score greater than 28/30 on the functional gait assessment in order to decrease fall risk in the community.   Target Date 17   Date Met      Progress:     GOALS NOT MET, PT SEEN FOR EVALUATION ONLY    Progress Toward Goals:   Progress this reporting period: pt did not return for follow up appointments, called to call all scheduled appts.    Plan:  Discharge from therapy.    Discharge:    Reason for Discharge: Patient chooses to discontinue therapy.  Patient has not made expected progress due to interrupted treatment attendance.    Equipment Issued: none.    Pt will continue with cardiac rehab.

## 2017-06-30 ENCOUNTER — HOSPITAL ENCOUNTER (OUTPATIENT)
Dept: CARDIAC REHAB | Facility: CLINIC | Age: 52
End: 2017-06-30
Attending: PHYSICAL MEDICINE & REHABILITATION
Payer: COMMERCIAL

## 2017-06-30 DIAGNOSIS — R79.89 ELEVATED SERUM CREATININE: ICD-10-CM

## 2017-06-30 LAB
ANION GAP SERPL CALCULATED.3IONS-SCNC: 8 MMOL/L (ref 3–14)
BUN SERPL-MCNC: 32 MG/DL (ref 7–30)
CALCIUM SERPL-MCNC: 9.5 MG/DL (ref 8.5–10.1)
CHLORIDE SERPL-SCNC: 102 MMOL/L (ref 94–109)
CO2 SERPL-SCNC: 27 MMOL/L (ref 20–32)
CREAT SERPL-MCNC: 1.62 MG/DL (ref 0.66–1.25)
GFR SERPL CREATININE-BSD FRML MDRD: 45 ML/MIN/1.7M2
GLUCOSE SERPL-MCNC: 136 MG/DL (ref 70–99)
POTASSIUM SERPL-SCNC: 5 MMOL/L (ref 3.4–5.3)
SODIUM SERPL-SCNC: 137 MMOL/L (ref 133–144)

## 2017-06-30 PROCEDURE — 80048 BASIC METABOLIC PNL TOTAL CA: CPT | Performed by: INTERNAL MEDICINE

## 2017-06-30 PROCEDURE — 40000116 ZZH STATISTIC OP CR VISIT: Performed by: OCCUPATIONAL THERAPIST

## 2017-06-30 PROCEDURE — 36415 COLL VENOUS BLD VENIPUNCTURE: CPT | Performed by: INTERNAL MEDICINE

## 2017-06-30 PROCEDURE — 93798 PHYS/QHP OP CAR RHAB W/ECG: CPT | Performed by: OCCUPATIONAL THERAPIST

## 2017-07-02 ENCOUNTER — TELEPHONE (OUTPATIENT)
Dept: INTERNAL MEDICINE | Facility: CLINIC | Age: 52
End: 2017-07-02

## 2017-07-02 DIAGNOSIS — N28.9 RENAL INSUFFICIENCY: Primary | ICD-10-CM

## 2017-07-02 NOTE — TELEPHONE ENCOUNTER
Please CALL patient on Monday (do not skip/delay call because of need for )!    Kinyarwanda  needed.    ---    Labs show worsening kidney function.    I think  this may be due to his low blood pressures recently, but I'm not sure.    It's very important that he...    1. Get in to see his cardiologist ASAP to help manage his cardiac regimen and optimize his blood pressure.    2. See a kidney specialist ASAP. I have placed 2 referrals (to see which he can get into first):    [facility]: [facility] [place] [phone]   [email or web address]  FHN: [facility] [place] [phone]   [email or web address]    3. Get another BMP between Monday and Wednesday (ordered - lab visit only).    ---    Thank you.

## 2017-07-03 ENCOUNTER — OFFICE VISIT (OUTPATIENT)
Dept: INTERNAL MEDICINE | Facility: CLINIC | Age: 52
End: 2017-07-03
Payer: COMMERCIAL

## 2017-07-03 VITALS
DIASTOLIC BLOOD PRESSURE: 58 MMHG | SYSTOLIC BLOOD PRESSURE: 92 MMHG | TEMPERATURE: 98.9 F | BODY MASS INDEX: 21.97 KG/M2 | HEART RATE: 109 BPM | WEIGHT: 119.4 LBS | OXYGEN SATURATION: 99 % | HEIGHT: 62 IN

## 2017-07-03 DIAGNOSIS — R29.898 LOWER LIMB SYMPTOM: Primary | ICD-10-CM

## 2017-07-03 DIAGNOSIS — N28.9 RENAL INSUFFICIENCY: ICD-10-CM

## 2017-07-03 DIAGNOSIS — I25.5 ISCHEMIC CARDIOMYOPATHY: ICD-10-CM

## 2017-07-03 LAB
ALBUMIN UR-MCNC: NEGATIVE MG/DL
ANION GAP SERPL CALCULATED.3IONS-SCNC: 8 MMOL/L (ref 3–14)
APPEARANCE UR: CLEAR
BILIRUB UR QL STRIP: NEGATIVE
BUN SERPL-MCNC: 23 MG/DL (ref 7–30)
CALCIUM SERPL-MCNC: 9.1 MG/DL (ref 8.5–10.1)
CHLORIDE SERPL-SCNC: 104 MMOL/L (ref 94–109)
CO2 SERPL-SCNC: 27 MMOL/L (ref 20–32)
COLOR UR AUTO: YELLOW
CREAT SERPL-MCNC: 1.26 MG/DL (ref 0.66–1.25)
GFR SERPL CREATININE-BSD FRML MDRD: 61 ML/MIN/1.7M2
GLUCOSE SERPL-MCNC: 171 MG/DL (ref 70–99)
GLUCOSE UR STRIP-MCNC: NEGATIVE MG/DL
HGB UR QL STRIP: NEGATIVE
KETONES UR STRIP-MCNC: NEGATIVE MG/DL
LEUKOCYTE ESTERASE UR QL STRIP: NEGATIVE
NITRATE UR QL: NEGATIVE
PH UR STRIP: 6 PH (ref 5–7)
POTASSIUM SERPL-SCNC: 4.8 MMOL/L (ref 3.4–5.3)
SODIUM SERPL-SCNC: 139 MMOL/L (ref 133–144)
SP GR UR STRIP: 1.01 (ref 1–1.03)
URN SPEC COLLECT METH UR: NORMAL
UROBILINOGEN UR STRIP-ACNC: 0.2 EU/DL (ref 0.2–1)

## 2017-07-03 PROCEDURE — 80048 BASIC METABOLIC PNL TOTAL CA: CPT | Performed by: INTERNAL MEDICINE

## 2017-07-03 PROCEDURE — 36415 COLL VENOUS BLD VENIPUNCTURE: CPT | Performed by: INTERNAL MEDICINE

## 2017-07-03 PROCEDURE — 99214 OFFICE O/P EST MOD 30 MIN: CPT | Performed by: INTERNAL MEDICINE

## 2017-07-03 PROCEDURE — 81003 URINALYSIS AUTO W/O SCOPE: CPT | Performed by: INTERNAL MEDICINE

## 2017-07-03 RX ORDER — BUMETANIDE 0.5 MG/1
0.5 TABLET ORAL DAILY
Qty: 120 TABLET | Refills: 0 | COMMUNITY
Start: 2017-07-03 | End: 2017-08-22

## 2017-07-03 RX ORDER — BACLOFEN 10 MG/1
10 TABLET ORAL 3 TIMES DAILY
Qty: 90 TABLET | Refills: 3 | Status: SHIPPED | OUTPATIENT
Start: 2017-07-03 | End: 2017-12-22

## 2017-07-03 NOTE — Clinical Note
Good evening, Dr. Cortez,  I wanted to let you know about some changes I made to Mr. Henao's regimen. I do hope the changes are temporary as I know how important a full cardiac regimen is for him to regain cardiac function. Please let me know if you have any additional/alternative ideas/suggestions or if you would like him to see you sooner than August.    I (and hopefully nephrology) will be following him closely.  Take care,  Shanna Church MD  [place] [address] [address] [address] T: [phone], F: [phone]

## 2017-07-03 NOTE — MR AVS SNAPSHOT
After Visit Summary   7/3/2017    Luke Henao    MRN: [id number]           Patient Information     Date Of Birth          1965        Visit Information        Provider Department      7/3/2017 2:45 PM Shanna Church MD; [employer] [facility]        Today's Diagnoses     Restless legs    -  1    Renal insufficiency          Care Instructions    HOLD Lisinopril for now - may be worsening kidney function.    HOLD Spirinolactone for now - may be worsening kidney function.    Please REDUCE Bumex to once a day.    BMP today and urine sample today.    ---    Kidney specialist - please call to schedule ASAP.    [facility]: [facility] - [place] [phone]     FHN: [facility] [place] [phone]     ---    For legs: recommend Baclofen at night to help with sleep and muscle relaxing.           Follow-ups after your visit        Your next 10 appointments already scheduled     Jul 07, 2017  8:00 AM CDT   Cardiac Treatment with  Cardiac Rehab 1   [facility] ([facility])    [address], [address]  [facility] [address]   [phone]            Jul 07, 2017  9:00 AM CDT   CONSULT with  Cardiac Rehab 1   [facility])    [address], [address]  [facility] [address]   [phone]            Jul 07, 2017 12:00 PM CDT   Anticoagulation Visit with [facility]   [facility]    [address] [address] [phone]            Jul 10, 2017  8:00 AM CDT   Cardiac Treatment with  Cardiac Rehab 1   [facility] ([facility])    [address]  [facility] [address]   [phone]            Jul 14, 2017  8:00 AM CDT   Cardiac Treatment with  Cardiac Rehab 1   [facility]  ([facility])    [address]  [place] [address]   [phone]            Jul 17, 2017  8:00 AM CDT   Cardiac Treatment with  Cardiac Rehab 1   [facility] ([facility])    [address]  [place] [address]   [phone]            Jul 18, 2017 11:20 AM CDT   Office Visit with Glenn Brown MD   [facility] ([facility])    [address] [id number] [phone]           Bring a current list of meds and any records pertaining to this visit.  For Physicals, please bring immunization records and any forms needing to be filled out.  Please arrive 10 minutes early to complete paperwork.            Jul 21, 2017  8:00 AM CDT   Cardiac Treatment with  Cardiac Rehab 1   [facility] ([facility])    [address]  [facility] [address]   [phone]            Jul 24, 2017  8:00 AM CDT   Cardiac Treatment with  Cardiac Rehab 1   [facility] ([facility])    [address]  [facility] [address]   [phone]            Jul 28, 2017  8:00 AM CDT   Cardiac Treatment with  Cardiac Rehab 1   [facility] ([facility])    [address]  [facility] [address]   [phone]              Future tests that were ordered for you today     Open Future Orders        Priority Expected Expires Ordered    Basic metabolic panel Routine  7/2/2018 7/2/2017            Who to contact     If you have questions or need follow up information about today's clinic visit or your schedule please contact [facility] directly at [phone].  Normal or non-critical lab and imaging results will be communicated to you by MyChart, letter or phone within 4 business days after the clinic has received the  "results. If you do not hear from us within 7 days, please contact the clinic through [employer] or phone. If you have a critical or abnormal lab result, we will notify you by phone as soon as possible.  Submit refill requests through [employer] or call your pharmacy and they will forward the refill request to us. Please allow 3 business days for your refill to be completed.          Additional Information About Your Visit        [employer] Information     [employer] lets you send messages to your doctor, view your test results, renew your prescriptions, schedule appointments and more. To sign up, go to [email or web address] . Click on \"Log in\" on the left side of the screen, which will take you to the Welcome page. Then click on \"Sign up Now\" on the right side of the page.     You will be asked to enter the access code listed below, as well as some personal information. Please follow the directions to create your username and password.     Your access code is: [email or web address]  Expires: 10/1/2017  3:25 PM     Your access code will  in 90 days. If you need help or a new code, please call your [place] clinic or [phone].        Care EveryWhere ID     This is your Care EveryWhere ID. This could be used by other organizations to access your [place] medical records  [id number]        Your Vitals Were     Pulse Temperature Height Pulse Oximetry BMI (Body Mass Index)       109 98.9  F (37.2  C) (Oral) 5' 2\" (1.575 m) 99% 21.84 kg/m2        Blood Pressure from Last 3 Encounters:   17 92/58   17 (!) 69/47   17 100/60    Weight from Last 3 Encounters:   17 119 lb 6.4 oz (54.2 kg)   17 119 lb (54 kg)   17 119 lb 14.4 oz (54.4 kg)              We Performed the Following     UA without Microscopic          Today's Medication Changes          These changes are accurate as of: 7/3/17  3:28 PM.  If you have any questions, ask your nurse or doctor.               Start taking these medicines.     "    Dose/Directions    baclofen 10 MG tablet   Commonly known as:  LIORESAL   Used for:  Restless legs   Started by:  Shanna Church MD        Dose:  10 mg   Take 1 tablet (10 mg) by mouth 3 times daily   Quantity:  90 tablet   Refills:  3         These medicines have changed or have updated prescriptions.        Dose/Directions    bumetanide 0.5 MG tablet   Commonly known as:  BUMEX   This may have changed:  when to take this   Changed by:  Shanna Church MD        Dose:  0.5 mg   Take 1 tablet (0.5 mg) by mouth daily   Quantity:  120 tablet   Refills:  0            Where to get your medicines      These medications were sent to [facility]/pharmacy #[id number] - [facility] [address] [address]     Phone:  [phone]     baclofen 10 MG tablet                Primary Care Provider Office Phone # Fax #    Shanna Church [phone]       [facility] [address] [facility] [address]        Equal Access to Services     DALTON AYALA : Hadii aad ku hadasho Soomaali, waaxda luqadaha, qaybta kaalmada adeegyada, waxay idiin hayaan shabbir nettles . So [facility] [phone].    ATENCIÓN: Si habla español, tiene a suarez disposición servicios gratuitos de asistencia lingüística. Llame al [phone].    We comply with applicable federal civil rights laws and [place] laws. We do not discriminate on the basis of race, color, national origin, age, disability sex, sexual orientation or gender identity.            Thank you!     Thank you for choosing [facility]  for your care. Our goal is always to provide you with excellent care. Hearing back from our patients is one way we can continue to improve our services. Please take a few minutes to complete the written survey that you may receive in the mail after your visit with us. Thank you!             Your Updated Medication List - Protect others around you: Learn how to  safely use, store and throw away your medicines at www.disposemymeds.org.          This list is accurate as of: 7/3/17  3:28 PM.  Always use your most recent med list.                   Brand Name Dispense Instructions for use Diagnosis    * acetaminophen 325 MG tablet    TYLENOL    100 tablet    Take 2 tablets (650 mg) by mouth every 4 hours as needed for mild pain or fever    Coronary artery disease involving native coronary artery of native heart without angina pectoris       * acetaminophen 500 MG tablet    TYLENOL    30 tablet    Take 2 tablets (1,000 mg) by mouth every 6 hours as needed for mild pain    Lower extremity pain, bilateral, Shakiness       alcohol swab prep pads     100 each    Use to swab area of injection/mary alice as directed 3 x per day    Diabetes mellitus type 2, insulin dependent (H)       ascorbic acid 500 MG Tabs     2 tablet    Take 1 tablet (500 mg) by mouth daily    Coronary artery disease involving native coronary artery of native heart without angina pectoris       aspirin 81 MG EC tablet     90 tablet    Take 1 tablet (81 mg) by mouth daily    Cardiogenic shock (H), Type 2 diabetes mellitus without complication, with long-term current use of insulin (H)       ASPIRIN NOT PRESCRIBED    INTENTIONAL    0 each    Please choose reason not prescribed, below    Cardiogenic shock (H), Type 2 diabetes mellitus without complication, with long-term current use of insulin (H)       atorvastatin 40 MG tablet    LIPITOR    60 tablet    1 tablet (40 mg) by Oral or Feeding Tube route daily    Coronary artery disease involving native coronary artery of native heart without angina pectoris, Cardiogenic shock (H)       baclofen 10 MG tablet    LIORESAL    90 tablet    Take 1 tablet (10 mg) by mouth 3 times daily    Restless legs       beta carotene 37955 UNIT capsule     2 capsule    Take 1 capsule (25,000 Units) by mouth daily    Ischemic cardiomyopathy       blood glucose monitoring test strip    no  brand specified    100 strip    Use to test blood sugar 3 times daily or as directed.    Diabetes mellitus type 2, insulin dependent (H)       bumetanide 0.5 MG tablet    BUMEX    120 tablet    Take 1 tablet (0.5 mg) by mouth daily        cetirizine 10 MG tablet    zyrTEC    60 tablet    Take 1 tablet (10 mg) by mouth daily    Seasonal allergic rhinitis, unspecified allergic rhinitis trigger       clopidogrel 75 MG tablet    PLAVIX    60 tablet    Take 1 tablet (75 mg) by mouth daily    ST elevation myocardial infarction (STEMI), unspecified artery (H)       digoxin 125 MCG tablet    LANOXIN    60 tablet    Take 1 tablet (125 mcg) by mouth daily    Coronary artery disease involving native coronary artery of native heart without angina pectoris, Cardiogenic shock (H)       finasteride 5 MG tablet    PROSCAR    60 tablet    Take 1 tablet (5 mg) by mouth daily    Benign prostatic hyperplasia with lower urinary tract symptoms, unspecified morphology       insulin glargine 100 UNIT/ML injection    LANTUS    9 mL    Inject 25 Units Subcutaneous every morning (before breakfast)    Diabetes mellitus type 2, insulin dependent (H)       insulin pen needle 32G X 4 MM    BD KYLEE U/F    100 each    Use 3 daily or as directed.    Diabetes mellitus type 2, insulin dependent (H)       metoprolol 25 MG 24 hr tablet    TOPROL-XL    45 tablet    Take 0.5 tablets (12.5 mg) by mouth daily    Mitral valve insufficiency, unspecified etiology       pantoprazole 40 MG EC tablet    PROTONIX    60 tablet    Take 1 tablet (40 mg) by mouth daily    Gastrointestinal hemorrhage associated with other gastritis       potassium chloride SA 20 MEQ CR tablet    K-DUR/KLOR-CON M    120 tablet    Take 1 tablet (20 mEq) by mouth 2 times daily    Ischemic cardiomyopathy       Sharps Container Misc     1 each    1 each every 30 days    Diabetes mellitus type 2, insulin dependent (H)       tamsulosin 0.4 MG capsule    FLOMAX    60 capsule    Take 1 capsule  (0.4 mg) by mouth daily    Ischemic cardiomyopathy       * Warfarin Therapy Reminder      1 each continuous prn    Deep vein thrombosis (DVT) of left lower extremity, unspecified chronicity, unspecified vein (H)       * warfarin 3 MG tablet    COUMADIN    100 tablet    Take one tablet daily as directed by the [facility]    Deep vein thrombosis (DVT) of left lower extremity, unspecified chronicity, unspecified vein (H)       zinc sulfate 220 (50 ZN) MG capsule    ZINCATE    2 capsule    Take 1 capsule (220 mg) by mouth daily    Ischemic cardiomyopathy       * Notice:  This list has 4 medication(s) that are the same as other medications prescribed for you. Read the directions carefully, and ask your doctor or other care provider to review them with you.

## 2017-07-03 NOTE — NURSING NOTE
"Chief Complaint   Patient presents with     Lab Result Notice     Forms       Initial BP 92/58  Pulse 109  Temp 98.9  F (37.2  C) (Oral)  Ht 5' 2\" (1.575 m)  Wt 119 lb 6.4 oz (54.2 kg)  SpO2 99%  BMI 21.84 kg/m2 Estimated body mass index is 21.84 kg/(m^2) as calculated from the following:    Height as of this encounter: 5' 2\" (1.575 m).    Weight as of this encounter: 119 lb 6.4 oz (54.2 kg).  Medication Reconciliation: complete   Sandra Mills MA   "

## 2017-07-03 NOTE — PATIENT INSTRUCTIONS
HOLD Lisinopril for now - may be worsening kidney function.    HOLD Spirinolactone for now - may be worsening kidney function.    Please REDUCE Bumex to once a day.    BMP today and urine sample today.    ---    Kidney specialist - please call to schedule ASAP.    [facility]: [facility] - [place] [phone]     [facility]: [facility] [place] [phone]     ---    For legs: recommend Baclofen at night to help with sleep and muscle relaxing.

## 2017-07-05 ENCOUNTER — TELEPHONE (OUTPATIENT)
Dept: INTERNAL MEDICINE | Facility: CLINIC | Age: 52
End: 2017-07-05

## 2017-07-05 NOTE — TELEPHONE ENCOUNTER
Reason for Call:  Other Letter to return to work    Detailed comments: Pt needs a letter from  to be faxed to current employer stating he can go back to work with no restrictions and if there are any restrictions with details directions on what they are and when they will be lifted. Please fax letter to [phone] attn: Lina Roe.     Phone Number Patient can be reached at: Home number on file [phone] (home)    Best Time: asap    Can we leave a detailed message on this number? YES    Call taken on 7/5/2017 at 9:44 AM by Airam Sosa

## 2017-07-05 NOTE — LETTER
[address] [address]  [phone]      07/05/17      Luke Henao  1965  [address]  [facility] [address]        To whom it may concern,    Mr. Henao may return to work 7/10/17 without restrictions.    Please contact me with any question or concerns.        Shanna Church MD   [address] [address]  T: [phone], F: [phone]

## 2017-07-05 NOTE — PROGRESS NOTES
SUBJECTIVE:                                                      HPI: Luke Henao is a pleasant 51 year old male who presents for follow-up from visit ~1.5 weeks ago:    - was seen for UC follow-up of leg tenderness, aching, and shakiness at night on 6/23/17  - also discussed worsening renal function at visit (Cr 1.05 on 6/8/17 and 1.53 on 6/19/17, when he was seen in UC)  - labs negative for iron deficiency, but showed continued renal insufficiency (1.48)  - leg symptoms thought to be due to significantly increased exercise and activity since discharge   - (after lengthy hospitalization, with prolonged immobilization and likely atrophy of leg muscles)    ---    - above results were discussed with patient the following week (6/27/17)  - patient's homecare RN noted that the patient was hypotensive (60s-70s/40s-50s) and feeling poorly  - recommended that following adjustments to his medication regimen:   - DECREASED Bumex 0.5mg to once daily (from twice daily)   - DECREASED lisinopril 2.5mg to once daily (from twice daily)  - repeat BMP 6/30/17 demonstrated worsening kidney function (Cr 1.62)  - patient asked to follow-up     ---    Today:  - patient generally feels well - no major complaints  - requests treatment for leg symptoms - also hoping treatment will help him sleep  - since I suspect that symptoms are muscular in etiology, suggested a muscle relaxant at night (will also help with sleep)   - patient is agreeable    - discussed my concerns re: his kidney dysfunction   - etiology unclear - differential includes:    - hypotension    - poor forward flow (due to ischemic CM)    - medications, and/or     - renal/ pathology    - we have addressed his hypotension by decreasing the frequency of Bumex and Lisinopril  - Bumex, spironolactone, and lisinopril could all be potentially nephrotoxic   - but they are all, otherwise, important in the treatment of his ischemic CM and diabetes    - we will have to toe the line  "between cardiac optimization and kidney function preservation     - NEED formal nephrology input as well as cardiology involvement    The medication, allergy, and problem lists have been reviewed and updated as appropriate.       OBJECTIVE:                                                      BP 92/58  Pulse 109  Temp 98.9  F (37.2  C) (Oral)  Ht 5' 2\" (1.575 m)  Wt 119 lb 6.4 oz (54.2 kg)  SpO2 99%  BMI 21.84 kg/m2  Constitutional: well-appearing  Respiratory: normal respiratory effort; clear to auscultation bilaterally  Cardiovascular: regular rate and rhythm; no edema  Gastrointestinal: soft, non-tender, non-distended, and bowel sounds present; no organomegaly or masses   Musculoskeletal: walks with walker  Psych: normal judgment and insight; normal mood and affect; recent and remote memory intact      ASSESSMENT/PLAN:                                                      (R24.845) Lower limb symptoms (primary encounter diagnosis)  Comment:    - tenderness, aching, and shakiness at night.    - description less c/w RLS, more c/w with muscle strengthening/conditioning.   Plan:    - TRIAL of Baclofen qhs as needed - this will also help patient sleep (as it can be sedating).   - if continued symptoms, patient to contact MD - could consider pramipexole or similar agent as well.     (N28.9) Renal insufficiency  Comment:    - significantly worsening over the last ~2.5 weeks.    - etiology unclear - differential includes:    - hypotension    - poor forward flow (due to ischemic CM)    - medications, and/or     - renal/ pathology  Plan:    - BMP and UA Macro/Micro today.    - if worsening renal function, will likely recommend inpatient evaluation.    - if stable or improved, will continue to need close monitoring.    - referred to nephrology for further evaluation - patient to schedule ASAP.    - CONTINUE Bumex 0.5mg daily (down from bid)   - HOLD spironolactone for now.   - HOLD lisinopril for now.     (I25.5) " Ischemic cardiomyopathy  Comment: in tough spot with worsening renal function - ACE-I and spironolactone important in management of ischemic CM.   Plan:    - CONTINUE Toprol 12.5mg daily - no change.   - CONTINUE Bumex 0.5mg daily (down from bid)   - HOLD spironolactone for now - until renal function improves.   - HOLD lisinopril for now - until renal function improves.   - will CC patient's cardiologist, Dr. Cortez to see if he may be able to suggest additional/alternative interventions.    The instructions on the AVS were discussed and explained to the patient. Patient expressed understanding of instructions.    A total of 25 minutes were spent face-to-face with this patient during this encounter and over half of that time was spent on counseling and coordination of care re: above diagnoses and plans of care.     Shanna Church MD   [address] [address]  T: [phone], F: [phone]

## 2017-07-05 NOTE — TELEPHONE ENCOUNTER
Reason for Call:  Request for results:    Name of test or procedure: kidney tests    Date of test of procedure: 6/30    Location of the test or procedure: [facility]    OK to leave the result message on voice mail or with a family member? YES    Phone number Patient can be reached at:  Other phone number:  [phone]    Additional comments: phone number listed is Javier Meltontrish's spouse-she states she doesn't need an interp for the phone call.    Call taken on 7/5/2017 at 2:21 PM by Airam Sosa

## 2017-07-07 ENCOUNTER — ANTICOAGULATION THERAPY VISIT (OUTPATIENT)
Dept: ANTICOAGULATION | Facility: CLINIC | Age: 52
End: 2017-07-07
Payer: COMMERCIAL

## 2017-07-07 ENCOUNTER — HOSPITAL ENCOUNTER (OUTPATIENT)
Dept: CARDIAC REHAB | Facility: CLINIC | Age: 52
End: 2017-07-07
Attending: PHYSICAL MEDICINE & REHABILITATION
Payer: COMMERCIAL

## 2017-07-07 VITALS — WEIGHT: 118.8 LBS | BODY MASS INDEX: 21.86 KG/M2 | HEIGHT: 62 IN

## 2017-07-07 LAB — INR POINT OF CARE: 2.4 (ref 0.86–1.14)

## 2017-07-07 PROCEDURE — 36416 COLLJ CAPILLARY BLOOD SPEC: CPT

## 2017-07-07 PROCEDURE — 85610 PROTHROMBIN TIME: CPT | Mod: QW

## 2017-07-07 PROCEDURE — 40000575 ZZH STATISTIC OP CARDIAC VISIT #2

## 2017-07-07 PROCEDURE — 93798 PHYS/QHP OP CAR RHAB W/ECG: CPT

## 2017-07-07 PROCEDURE — 93797 PHYS/QHP OP CAR RHAB WO ECG: CPT

## 2017-07-07 PROCEDURE — 40000116 ZZH STATISTIC OP CR VISIT

## 2017-07-07 ASSESSMENT — 6 MINUTE WALK TEST (6MWT)
MALE CALC: 1778.6
PREDICTED: 1789.45
TOTAL DISTANCE WALKED (FT): 300
GENDER SELECTION: MALE
FEMALE CALC: 1943.22

## 2017-07-07 NOTE — PROGRESS NOTES
07/07/17 0900   Luke Henao  STEMI, stent, Mitral Clip    Session   Session 60 Day Individualized Treatment Plan   Certified through this date 08/05/17   Cardiac Rehab Assessment   Cardiac Rehab Assessment 5/31/17 Patient is a 51-year-old Syrian gentleman who presented to [facility] on 03/26/17 with severe MI. He was transferred to [facility] for futher workup and management on the 27th. Patient spent 6 weeks in the hospital due to cardiogenic shock requiring intraaortic balloon pump and temporary pacing management. Patient had a lyla clip on 05/01/17. Patient also experienced ischemic stroke in bilateral hemispheres during hospitalization. He was tranferred to acute rehab after DC from hospital for physical, occupational and speech therapy. From a mobility standpoint, patient is currently using a walker or cane. Patient reports he is working towards walking without cane in PT/OT. Patient reports he feel very weak since leaving acute rehab last week. He reports most of the strength he had is now gone. He is very interested and motivated to return to his work as a  at [employer] Patient had been working 7 days per week working 9-10 hour shifts. Patient plans to return to work working 5 days per week in 8 hour shifts. Patient will benefit from skilled therapy to monitor CV response to exercise, to provide guidance in aerobic exercise to assist patient in returning to previous levels of activity/ employment.  present for initial evaluation today. 7/7/2017 Patient reported during the 1:1  that he is going back to full time work.  He is confident he can tolerate it, but  therapist is not as convinced.  He will be coming to rehab only 1x a week.  Will reevaluate his status at the end of the next week.  Patient was instructed  to do strength training at home.  Patient will continue to benefit from monitored exercise for risk factor education and modification.   General Information    Treatment Diagnosis STEMI   Date of Treatment Diagnosis 03/26/17   Secondary Treatment Diagnosis Stent   Significant Past CV History None   Comorbidities DM   Other Medical History .pmh   Lead up symptoms nausea (patient thought it was the flu)   Hospital Location [facility]   Hospital Discharge Date 05/26/17   Signs and Symptoms Post Hospital Discharge Sleep;Appetite;Fatigue   Outpatient Cardiac Rehab Start Date 05/31/17   Primary Physician Dr. Glenn Brown   Primary Physician Follow Up Scheduled   Surgeon Dr. Leung   Surgeon Follow Up NA   Cardiologist Dr. Cortez   Cardiologist Follow Up Scheduled   Ejection Fraction 30-35%   Risk Stratification High   Summary of Cath Report   Summary of Cath Report Available   Date Performed 05/01/17   Left Main mild luminal irregularities   LAD (stents)   D1 jailed by the LAD stents, but has EBER 3 flow with disease to 30% stenosis   LCX occluded at ostium   Ramus no longer present   RCA patent stents extending from the proximal to mid RCA, mRCA has 50%, dRCA 10%   PDA RPDA has widely patent stent, remainder of artery has mild disease   Living and Work Status    Living Arrangements and Social Status house   Support System Live with an adult   Return to Employment No   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 No   Have you fallen and had an injury in the past year? No   Referral Initiated to Physical Therapy Patient currently participating   Comment 5/31/17 Patient is currently attending PT, OT and speech therapy   Pain   Patient Currently in Pain Yes   Pain Location upper leg   Pain Rating 2/10   Pain Description Ache   Pain Description Comment 5/31/17 Patient reports his legs ache, which he feels is related to muscular dysptrophy/weakness from being in the  "hospital   Physical Assessments   Incisions WNL   Edema None   Right Lung Sounds normal   Left Lung Sounds normal   Limitations Other (see comments)  (mitral valve clip 5/2)   Individualized Treatment Plan   Monitored Sessions Scheduled 16   Monitored Sessions Attended 7   Oxygen   Supplemental Oxygen needed No   Nutrition Management - Weight Management   Assessment Re-assessment   Age 51   Weight 53.9 kg (118 lb 12.8 oz)   Height 1.575 m (5' 2.01\")   BMI (Calculated) 21.77   Initial Rate Your Plate Score. Dietary tool to assess eating patterns. Scores range from 24 to 72. The higher the score the healthier the eating pattern. (patient needs to complete survey)   Weight Management Comments 5/31/17 Patient is down 15# since coming into hospital. Patient is looking to maintain his weight.    Nutrition Management - Lipids   Lipids Labs Available   Date 03/22/17   Total Cholesterol 128   Triglycerides 76   HDL 36   LDL 77   Prescribed Lipid Medication Yes   Statin Intensity Intensity Not Indicated   Nutrition Management - Diabetes   Diabetes Type II   Do you Monitor BS at Home? Yes   Diabetes Medication Prescribed Yes, Insulin   Hb A1C Date: 04/07/17   Hb A1C Result: 7.1   Nutrition Management Summary   Dietary Recommendations Anticoagulation Therapy;Low Sodium;Diabetic   Stages of Change for Diet Compliance Action   Interventions Planned Educate on Weight Management Principles;Educate about Signs/Symptoms and Treatment of Hypo/Hyperglycemia;Educate on Benefits of Exercise;Instruct on Label Reading   Nutrition Summary Comments 5/31/17 Patient reports he is following a diabetic, anticoagulation, low sodium diet. His wife cooks for him and watches what he eats closely.   Nutrition Target Outcome Hb A1C < 7.0   Psychosocial Management   Psychosocial Assessment Re-assessment   Is there history of clinical depression or increased risk of depression? No previous history   Current Level of Stress per Patient Report Mild "   Current Coping Skills Uses Stress Management/Relaxation Techniques;Has Positive Support System   Initial Patient Health Questionnaire -9 Score (PHQ-9) for depression. 5-9 Minimal symptoms, 10-14 Minor depression, 15-19 Major depression, moderately severe, > 20 Major depression, severe  15   Initial [employer] COOP Survey score.  Quality of Life:   If total score > 25 review individual areas where patient rated a 4 or 5.  Consider patients current medical condition and what role that plays on the score.   Adjust treatment protocol to improve areas of concern.  Consider the following:  PHQ9 score, DASI, and re-assessment within the next 30 days to assist with developing treatments.  25   Stages of Change Maintenance   Interventions Planned Patient to verbalize understanding of behavioral assessment results;Reassess PHQ-9 and/or [employer] COOP Surveys if outside of defined limits   Psychosocial Comments 5/31/17 Patient reports his stress is well-managed. Patient denied feeling depressed when questioned. He did score an elevated score on PHQ-9. Patient will be reassessed within the month.    Psychosocial Target Outcome Identify absence or presence of depression using valid screening tool   Other Core Components - Hypertension   History of or Diagnosis of Hypertension Yes   Currently taking Anti-Hypertensives Yes;Ace Inhibitor   Other Core Components - Tobacco   History of Tobacco Use Yes   Quit Date or Planned Quit Date 11/01/16   Tobacco Use Status Former (Quit > 6 mo ago)   Tobacco Habit Cigarettes   Tobacco Use per Day (average) 1 PPD   Years of Tobacco Use 29   Stages of Change Maintenance   Tobacco Comments 5/31/17 Patient quit smoking when he was diagnosed with DMII last winter. Patient quit cold turkey and is very confident he will not smoke again. Patient is past the 6 month nivia and is now in the maintenance stage of change.   Other Core Components Summary   Interventions Planned Instruct patient on the DASH  diet;List benefits of weight management;Educate on importance of monitoring daily weight;Instruct and educate to self manage Heart Failure symptoms;Educate on the use of 'Stop Light' tool   Activity/Exercise History   Activity/Exercise Assessment Re-assessment   Activity/Exercise Status prior to event? Was Physically Active   Number of Days Currently participating in Moderate Physical Activity? 0   Number of Days Currently performing  Aerobic Exercise (including rehab)? 7   Number of Minutes per Session Currently of Aerobic Exercise (average)? 30   Current Stage of Change (Physical Activity) Preparation   Current Stage of Change (Aerobic Exercise) Preparation   Patient Goals Goal #1;Goal #2   Goal #1 Description Patient will gain strength and endurance to be able to return to work 5 days per week working 8 hour shift.   Goal #1 Target Date 07/03/17   Goal #1 Progress Towards Goal 7/7/2017 Patient is returning to work this Sunday for 8 hour shifts. He is confident he can do it , but therapist is not convinced.  He is still working at  a low level of exercise here.   Goal #2 Description Patient will attend cardiac rehab 2 days per week to monitor CV response to exercise, to educate on CV related symptoms so patient is confident he's stable and can return to work.   Goal #2 Target Date 07/03/17   Goal #2 Progress Towards Goal 7/7/2017 Patient has only been attending cardiac rehab for 1x per week.  He is at 4.5 Mets on the nustep and he is using 3 poound weights.  Instructed his to try to increase his weights and start doing so weight lifting at home.   Activity/Exercise Comments 5/31/17 Patient has been working on balance, walking and stair climbing in rehab.    Activity/Exercise Target Outcome An Accumulation of 150  Minutes of Aerobic Activity per Week   Exercise Assessment   6 Minute Walk Predicted - Gender Selection Male   6 Minute Walk Predicted (Male) 1778.6   6 Minute Walk Predicted (Female) 1943.22   Initial 6  Minute Walk Distance (Feet) 300 ft   Resting  bpm   Exercise  bpm   Post Exercise  bpm   Resting BP 96/70   Exercise /70   Post Exercise BP 98/70   Pre  mg/dL   Post  mg/dL   Effort Rating 6   Current MET Level 4.5   MET Level Goal 5-6  (Changed on 7/7/2017)   ECG Rhythm Sinus tachycardia   Ectopy PVCs   Current Symptoms Fatigue;Weakness   Limitations/Restrictions Other (see comments)  (mitral clip 5/2)   Exercise Prescription   Mode Nustep;Treadmill;Weights;Ambulation   Duration/Time 15-30 min;Intermittent bouts   Frequency 2 days/week   THR (85% of age predicted max HR) 145.35   OMNI Effort Rating (0-10 Scale) 4-6/10   Progression Continuous bouts;Progress peak intensity by 1/4 MET per week;Total exercise time of 30-45 minutes   Recommended Home Exercise   Type of Exercise Walking   Frequency (days per week) 2-3   Duration (minutes per session) 30-45 min   Effort Rating Recommended 4-6/10   30 Day Exercise Plan Pateint will add weight training to home exercise.   Current Home Exercise   Type of Exercise Walking   Frequency (days per week) 7   Duration (minutes per session) 30   Follow-up/On-going Support   Provider follow-up needed on the following No follow-up needed   Learning Assessment   Learner Patient   Primary Language Other  (Upper sorbian)   Preferred Learning Style Listening;Demonstration   Barriers to Learning Language   Patient Education   Education Comments 5/31/17 Patient will benefit from 1:1 education as patient needs     Physician cosignature/electronic signature indicates agreements with the ITP document and approval of discharge.

## 2017-07-07 NOTE — MR AVS SNAPSHOT
Luke Henao   7/7/2017 4:00 PM   Anticoagulation Therapy Visit    Description:  51 year old male   Provider:  [facility] [facility];  ANTICOAGULATION CLINIC   Department:   Anti Coagulation           INR as of 7/7/2017     Today's INR 2.4      Anticoagulation Summary as of 7/7/2017     INR goal 2.0-3.0   Today's INR 2.4   Full instructions 3 mg every day   Next INR check 7/21/2017    Indications   Stroke (H) [I63.9]         Your next Anticoagulation Clinic appointment(s)     Jul 21, 2017  1:15 PM CDT   Anticoagulation Visit with  ANTICOAGULATION CLINIC   [facility]    [address] [address] [phone]              Contact Numbers     [facility]  Please call  [phone] to cancel and/or reschedule your appointment   Please call  [phone] with any problems or questions regarding your therapy.        July 2017 Details    Sun Mon Tue Wed Thu Fri Sat           1                 2               3               4               5               6               7      3 mg   See details      8      3 mg           9      3 mg         10      3 mg         11      3 mg         12      3 mg         13      3 mg         14      3 mg         15      3 mg           16      3 mg         17      3 mg         18      3 mg         19      3 mg         20      3 mg         21            22                 23               24               25               26               27               28               29                 30               31                     Date Details   07/07 This INR check       Date of next INR:  7/21/2017         How to take your warfarin dose     To take:  3 mg Take 1 of the 3 mg tablets.

## 2017-07-07 NOTE — PROGRESS NOTES
ANTICOAGULATION FOLLOW-UP CLINIC VISIT    Patient Name:  Luke Henao  Date:  7/7/2017  Contact Type:  Face to Face    SUBJECTIVE:        OBJECTIVE    INR Protime   Date Value Ref Range Status   07/07/2017 2.4 (A) 0.86 - 1.14 Final       ASSESSMENT / PLAN  INR assessment THER    Recheck INR In: 2 WEEKS    INR Location Clinic      Anticoagulation Summary as of 7/7/2017     INR goal 2.0-3.0   Today's INR 2.4   Maintenance plan 3 mg (3 mg x 1) every day   Full instructions 3 mg every day   Weekly total 21 mg   No change documented Velvet Jones, RN   Plan last modified Shalonda Fitzgerald RN (6/13/2017)   Next INR check 7/21/2017   Target end date     Indications   Stroke (H) [I63.9]         Anticoagulation Episode Summary     INR check location     Preferred lab     Send INR reminders to  [facility]    Comments             See the Encounter Report to view Anticoagulation Flowsheet and Dosing Calendar (Go to Encounters tab in chart review, and find the Anticoagulation Therapy Visit)        Velvet Jones, RN

## 2017-07-11 DIAGNOSIS — E11.9 DIABETES MELLITUS TYPE 2, INSULIN DEPENDENT (H): ICD-10-CM

## 2017-07-11 DIAGNOSIS — Z79.4 DIABETES MELLITUS TYPE 2, INSULIN DEPENDENT (H): ICD-10-CM

## 2017-07-11 NOTE — TELEPHONE ENCOUNTER
insulin glargine (LANTUS) 100 UNIT/ML injection         Last Written Prescription Date: 05/25/2017  Last Fill Quantity: 9ml, # refills: 0  Last Office Visit with G, P or [facility] prescribing provider:  07/31/2017        BP Readings from Last 3 Encounters:   07/03/17 92/58   06/27/17 (!) 69/47   06/23/17 100/60     No results found for: MICROL  No results found for: UMALCR  Creatinine   Date Value Ref Range Status   07/03/2017 1.26 (H) 0.66 - 1.25 mg/dL Final   ]  GFR Estimate   Date Value Ref Range Status   07/03/2017 61 >60 mL/min/1.7m2 Final     Comment:     Non  GFR Calc   06/30/2017 45 (L) >60 mL/min/1.7m2 Final     Comment:     Non  GFR Calc   06/23/2017 50 (L) >60 mL/min/1.7m2 Final     Comment:     Non  GFR Calc     GFR Estimate If Black   Date Value Ref Range Status   07/03/2017 73 >60 mL/min/1.7m2 Final     Comment:      GFR Calc   06/30/2017 55 (L) >60 mL/min/1.7m2 Final     Comment:      GFR Calc   06/23/2017 61 >60 mL/min/1.7m2 Final     Comment:      GFR Calc     Lab Results   Component Value Date    CHOL 128 03/27/2017     Lab Results   Component Value Date    HDL 36 03/27/2017     Lab Results   Component Value Date    LDL 77 03/27/2017     Lab Results   Component Value Date    TRIG 76 03/27/2017     No results found for: CHOLHDLRATIO  Lab Results   Component Value Date    AST 16 06/23/2017     Lab Results   Component Value Date    ALT 24 06/23/2017     Lab Results   Component Value Date    A1C 5.7 06/06/2017    A1C  05/04/2017     Below Assay Range   Canceled, Test credited  EMILIE LAW RN @ 1014 5.4.17 KLA      A1C 7.1 04/08/2017    A1C 7.5 03/27/2017     Potassium   Date Value Ref Range Status   07/03/2017 4.8 3.4 - 5.3 mmol/L Final

## 2017-07-12 ENCOUNTER — DOCUMENTATION ONLY (OUTPATIENT)
Dept: LAB | Facility: CLINIC | Age: 52
End: 2017-07-12

## 2017-07-12 DIAGNOSIS — N28.9 RENAL INSUFFICIENCY: Primary | ICD-10-CM

## 2017-07-12 DIAGNOSIS — N28.9 RENAL INSUFFICIENCY: ICD-10-CM

## 2017-07-12 PROCEDURE — 36415 COLL VENOUS BLD VENIPUNCTURE: CPT | Performed by: INTERNAL MEDICINE

## 2017-07-12 PROCEDURE — 80048 BASIC METABOLIC PNL TOTAL CA: CPT | Performed by: INTERNAL MEDICINE

## 2017-07-13 LAB
ANION GAP SERPL CALCULATED.3IONS-SCNC: 7 MMOL/L (ref 3–14)
BUN SERPL-MCNC: 27 MG/DL (ref 7–30)
CALCIUM SERPL-MCNC: 9.3 MG/DL (ref 8.5–10.1)
CHLORIDE SERPL-SCNC: 104 MMOL/L (ref 94–109)
CO2 SERPL-SCNC: 29 MMOL/L (ref 20–32)
CREAT SERPL-MCNC: 1.25 MG/DL (ref 0.66–1.25)
GFR SERPL CREATININE-BSD FRML MDRD: 61 ML/MIN/1.7M2
GLUCOSE SERPL-MCNC: 102 MG/DL (ref 70–99)
POTASSIUM SERPL-SCNC: 4 MMOL/L (ref 3.4–5.3)
SODIUM SERPL-SCNC: 140 MMOL/L (ref 133–144)

## 2017-07-14 ENCOUNTER — HOSPITAL ENCOUNTER (OUTPATIENT)
Dept: CARDIAC REHAB | Facility: CLINIC | Age: 52
End: 2017-07-14
Attending: PHYSICAL MEDICINE & REHABILITATION
Payer: COMMERCIAL

## 2017-07-14 PROCEDURE — 40000116 ZZH STATISTIC OP CR VISIT: Performed by: CLINICAL EXERCISE PHYSIOLOGIST

## 2017-07-14 PROCEDURE — 93798 PHYS/QHP OP CAR RHAB W/ECG: CPT | Performed by: CLINICAL EXERCISE PHYSIOLOGIST

## 2017-07-17 ENCOUNTER — TELEPHONE (OUTPATIENT)
Dept: INTERNAL MEDICINE | Facility: CLINIC | Age: 52
End: 2017-07-17

## 2017-07-17 NOTE — TELEPHONE ENCOUNTER
Reason for Call:  Request for results:    Name of test or procedure: labs    Date of test of procedure: 7/12/17    Location of the test or procedure: [facility]    OK to leave the result message on voice mail or with a family member? YES    Phone number Patient can be reached at:  Home number on file [phone] (home)    Additional comments: anytime     Call taken on 7/17/2017 at 12:49 PM by NAOMI LI

## 2017-07-19 DIAGNOSIS — J30.2 SEASONAL ALLERGIC RHINITIS: Primary | ICD-10-CM

## 2017-07-19 DIAGNOSIS — I25.10 CORONARY ARTERY DISEASE INVOLVING NATIVE CORONARY ARTERY OF NATIVE HEART WITHOUT ANGINA PECTORIS: ICD-10-CM

## 2017-07-19 DIAGNOSIS — N40.1 HYPERTROPHY OF PROSTATE WITH URINARY OBSTRUCTION: ICD-10-CM

## 2017-07-19 DIAGNOSIS — R57.0 CARDIOGENIC SHOCK (H): ICD-10-CM

## 2017-07-19 DIAGNOSIS — I21.3 ST ELEVATION MYOCARDIAL INFARCTION (STEMI), UNSPECIFIED ARTERY (H): ICD-10-CM

## 2017-07-19 DIAGNOSIS — I82.402 DEEP VEIN THROMBOSIS (DVT) OF LEFT LOWER EXTREMITY, UNSPECIFIED CHRONICITY, UNSPECIFIED VEIN (H): ICD-10-CM

## 2017-07-19 DIAGNOSIS — I25.5 ISCHEMIC CARDIOMYOPATHY: ICD-10-CM

## 2017-07-19 DIAGNOSIS — K29.61 GASTROINTESTINAL HEMORRHAGE ASSOCIATED WITH OTHER GASTRITIS: ICD-10-CM

## 2017-07-19 DIAGNOSIS — N13.8 HYPERTROPHY OF PROSTATE WITH URINARY OBSTRUCTION: ICD-10-CM

## 2017-07-19 NOTE — TELEPHONE ENCOUNTER
atorvastatin (LIPITOR) 40 MG tablet,cetirizine (ZYRTEC) 10 MG tablet, tamsulosin (FLOMAX) 0.4 MG capsule, finasteride (PROSCAR) 5 MG tablet,clopidogrel (PLAVIX) 75 MG tablet, pantoprazole (PROTONIX) 40 MG EC tablet,digoxin (LANOXIN) 125 MCG tablet  Last Written Prescription Date: 05/25/2017  Last Fill Quantity: 60,  # refills: 0   Last Office Visit with [facility] or [facility] prescribing provider: 07/03/2017                                             potassium chloride SA (K-DUR/KLOR-CON M) 20 MEQ CR tablet          Last Written Prescription Date: 05/25/2017  Last Fill Quantity: 120,  # refills: 0   Last Office Visit with [facility] or [facility] prescribing provider: 07/03/2017

## 2017-07-20 ENCOUNTER — TELEPHONE (OUTPATIENT)
Dept: INTERNAL MEDICINE | Facility: CLINIC | Age: 52
End: 2017-07-20

## 2017-07-20 ENCOUNTER — ANTICOAGULATION THERAPY VISIT (OUTPATIENT)
Dept: ANTICOAGULATION | Facility: CLINIC | Age: 52
End: 2017-07-20
Payer: COMMERCIAL

## 2017-07-20 DIAGNOSIS — I63.30 CEREBROVASCULAR ACCIDENT (CVA) DUE TO THROMBOSIS OF CEREBRAL ARTERY (H): ICD-10-CM

## 2017-07-20 LAB — INR POINT OF CARE: 3.2 (ref 0.86–1.14)

## 2017-07-20 PROCEDURE — 36416 COLLJ CAPILLARY BLOOD SPEC: CPT

## 2017-07-20 PROCEDURE — 85610 PROTHROMBIN TIME: CPT | Mod: QW

## 2017-07-20 RX ORDER — WARFARIN SODIUM 3 MG/1
TABLET ORAL
Qty: 100 TABLET | Refills: 0 | Status: SHIPPED | OUTPATIENT
Start: 2017-07-20 | End: 2017-10-03

## 2017-07-20 RX ORDER — PANTOPRAZOLE SODIUM 40 MG/1
40 TABLET, DELAYED RELEASE ORAL DAILY
Qty: 60 TABLET | Refills: 11 | Status: SHIPPED | OUTPATIENT
Start: 2017-07-20 | End: 2017-12-22

## 2017-07-20 RX ORDER — CLOPIDOGREL BISULFATE 75 MG/1
75 TABLET ORAL DAILY
Qty: 60 TABLET | Refills: 11 | Status: SHIPPED | OUTPATIENT
Start: 2017-07-20 | End: 2018-09-07

## 2017-07-20 RX ORDER — DIGOXIN 125 MCG
125 TABLET ORAL DAILY
Qty: 60 TABLET | Refills: 0 | Status: SHIPPED | OUTPATIENT
Start: 2017-07-20 | End: 2017-09-18

## 2017-07-20 RX ORDER — FINASTERIDE 5 MG/1
5 TABLET, FILM COATED ORAL DAILY
Qty: 60 TABLET | Refills: 11 | Status: SHIPPED | OUTPATIENT
Start: 2017-07-20 | End: 2018-09-07

## 2017-07-20 RX ORDER — CETIRIZINE HYDROCHLORIDE 10 MG/1
10 TABLET ORAL DAILY
Qty: 60 TABLET | Refills: 11 | Status: SHIPPED | OUTPATIENT
Start: 2017-07-20 | End: 2017-12-22

## 2017-07-20 RX ORDER — POTASSIUM CHLORIDE 1500 MG/1
20 TABLET, EXTENDED RELEASE ORAL 2 TIMES DAILY
Qty: 120 TABLET | Refills: 11 | Status: SHIPPED | OUTPATIENT
Start: 2017-07-20 | End: 2017-11-30

## 2017-07-20 RX ORDER — ATORVASTATIN CALCIUM 40 MG/1
40 TABLET, FILM COATED ORAL DAILY
Qty: 60 TABLET | Refills: 7 | Status: SHIPPED | OUTPATIENT
Start: 2017-07-20 | End: 2018-09-10

## 2017-07-20 NOTE — TELEPHONE ENCOUNTER
"[facility] is calling regarding RX for potassium chloride SA (K-DUR/KLOR-CON M) 20 MEQ CR tablet which has been refilled today (7/20). Pharmacy says that pt recently had spironolactone (ALDACTONE) 25 MG tablet refilled on 6/18 for 30 days. PCP's note from Office Visit on 7/3 states \"HOLD spironolactone for now - until renal function improves.\"  Pharmacy wants to make sure PCP is aware of refill for potassium, and make sure he is not taking it with spironolactone.  "

## 2017-07-20 NOTE — TELEPHONE ENCOUNTER
Potassium   Date Value Ref Range Status   07/12/2017 4.0 3.4 - 5.3 mmol/L Final     Creatinine   Date Value Ref Range Status   07/12/2017 1.25 0.66 - 1.25 mg/dL Final     BP Readings from Last 3 Encounters:   07/03/17 92/58   06/27/17 (!) 69/47   06/23/17 100/60         Lab Results   Component Value Date    CHOL 128 03/27/2017     Lab Results   Component Value Date    HDL 36 03/27/2017     Lab Results   Component Value Date    LDL 77 03/27/2017     Lab Results   Component Value Date    TRIG 76 03/27/2017     No results found for: CHOLLISEO

## 2017-07-20 NOTE — MR AVS SNAPSHOT
Luke Henao   7/20/2017 2:30 PM   Anticoagulation Therapy Visit    Description:  51 year old male   Provider:  DIANA GIRON;  ANTICOAGULATION CLINIC   Department:   Anti Coagulation           INR as of 7/20/2017     Today's INR 3.2!      Anticoagulation Summary as of 7/20/2017     INR goal 2.0-3.0   Today's INR 3.2!   Full instructions 7/20: 1.5 mg; Otherwise 3 mg every day   Next INR check 8/9/2017    Indications   Stroke (H) [I63.9]         Your next Anticoagulation Clinic appointment(s)     Aug 09, 2017  2:15 PM CDT   Anticoagulation Visit with  ANTICOAGULATION CLINIC   [facility] ([facility])    [address] [address] [phone]              Contact Numbers     [facility]  Please call  [phone] to cancel and/or reschedule your appointment   Please call  [phone] with any problems or questions regarding your therapy.        July 2017 Details    Sun Mon Tue Wed Thu Fri Sat           1                 2               3               4               5               6               7               8                 9               10               11               12               13               14               15                 16               17               18               19               20      1.5 mg   See details      21      3 mg         22      3 mg           23      3 mg         24      3 mg         25      3 mg         26      3 mg         27      3 mg         28      3 mg         29      3 mg           30      3 mg         31      3 mg               Date Details   07/20 This INR check               How to take your warfarin dose     To take:  1.5 mg Take 0.5 of a 3 mg tablet.    To take:  3 mg Take 1 of the 3 mg tablets.           August 2017 Details    Sun Mon Tue Wed Thu Fri Sat       1      3 mg         2      3 mg         3      3 mg         4      3 mg         5      3 mg           6      3 mg          7      3 mg         8      3 mg         9            10               11               12                 13               14               15               16               17               18               19                 20               21               22               23               24               25               26                 27               28               29               30               31                  Date Details   No additional details    Date of next INR:  8/9/2017         How to take your warfarin dose     To take:  3 mg Take 1 of the 3 mg tablets.

## 2017-07-20 NOTE — PROGRESS NOTES
ANTICOAGULATION FOLLOW-UP CLINIC VISIT    Patient Name:  Luke Henao  Date:  7/20/2017  Contact Type:  Face to Face    SUBJECTIVE:        OBJECTIVE    INR Protime   Date Value Ref Range Status   07/20/2017 3.2 (A) 0.86 - 1.14 Final       ASSESSMENT / PLAN  INR assessment SUPRA    Recheck INR In: 3 WEEKS    INR Location Clinic      Anticoagulation Summary as of 7/20/2017     INR goal 2.0-3.0   Today's INR 3.2!   Maintenance plan 3 mg (3 mg x 1) every day   Full instructions 7/20: 1.5 mg; Otherwise 3 mg every day   Weekly total 21 mg   Plan last modified Shalonda Fitzgerald, RN (6/13/2017)   Next INR check 8/9/2017   Target end date     Indications   Stroke (H) [I63.9]         Anticoagulation Episode Summary     INR check location     Preferred lab     Send INR reminders to  [facility]    Comments             See the Encounter Report to view Anticoagulation Flowsheet and Dosing Calendar (Go to Encounters tab in chart review, and find the Anticoagulation Therapy Visit)        Shalonda Fitzgerald RN

## 2017-07-21 NOTE — TELEPHONE ENCOUNTER
Tried to call pt thru  services. But the phone keeps ringing and then stops.  Will try again later.

## 2017-07-28 ENCOUNTER — DOCUMENTATION ONLY (OUTPATIENT)
Dept: OTHER | Facility: CLINIC | Age: 52
End: 2017-07-28

## 2017-07-28 PROBLEM — Z71.89 ADVANCED DIRECTIVES, COUNSELING/DISCUSSION: Chronic | Status: ACTIVE | Noted: 2017-07-28

## 2017-08-02 ASSESSMENT — 6 MINUTE WALK TEST (6MWT)
MALE CALC: 1780.7
FEMALE CALC: 1945.95
PREDICTED: 1791.56
GENDER SELECTION: MALE
TOTAL DISTANCE WALKED (FT): 300

## 2017-08-02 NOTE — PROGRESS NOTES
Luke Henao  52 year old  STEMI/ stent   08/02/17 1300   Session   Session 90 Day Individualized Treatment Plan   Certified through this date 09/02/17   Cardiac Rehab Assessment    Physician cosignature/electronic signature indicates approval of this ITP document. I have established, reviewed and made necessary changes to the individualized treatment plan and exercise prescription for this patient.     Cardiac Rehab Assessment 5/31/17 Patient is a 51-year-old English gentleman who presented to [facility] on 03/26/17 with severe MI. He was transferred to [facility] for futher workup and management on the 27th. Patient spent 6 weeks in the hospital due to cardiogenic shock requiring intraaortic balloon pump and temporary pacing management. Patient had a lyla clip on 05/01/17. Patient also experienced ischemic stroke in bilateral hemispheres during hospitalization. He was tranferred to acute rehab after DC from hospital for physical, occupational and speech therapy. From a mobility standpoint, patient is currently using a walker or cane. Patient reports he is working towards walking without cane in PT/OT. Patient reports he feel very weak since leaving acute rehab last week. He reports most of the strength he had is now gone. He is very interested and motivated to return to his work as a  at [employer] Patient had been working 7 days per week working 9-10 hour shifts. Patient plans to return to work working 5 days per week in 8 hour shifts. Patient will benefit from skilled therapy to monitor CV response to exercise, to provide guidance in aerobic exercise to assist patient in returning to previous levels of activity/ employment.  present for initial evaluation today. 7/7/2017 Patient reported during the 1:1  that he is going back to full time work.  He is confident he can tolerate it, but  therapist is not as convinced.  He will be coming to rehab only 1x a week.  Will reevaluate his  status at the end of the next week.  Patient was instructed  to do strength training at home.  Patient will continue to benefit from monitored exercise for risk factor education and modification. 8/2/17 ITP forwarded for review by medical director. Patient will be updated on goals/ progress within the next 1-2 sessions. Patient continues to benefit from skilled therapy to monitor CV response to exercise, to provide exercise guidelines in aerobic exercise to help patient return to work schedule and normal activity level.    General Information   Treatment Diagnosis STEMI   Date of Treatment Diagnosis 03/26/17   Secondary Treatment Diagnosis Stent   Significant Past CV History None   Comorbidities DM   Other Medical History .pmh   Lead up symptoms nausea (patient thought it was the flu)   Hospital Location [facility]   Hospital Discharge Date 05/26/17   Signs and Symptoms Post Hospital Discharge Sleep;Appetite;Fatigue   Outpatient Cardiac Rehab Start Date 05/31/17   Primary Physician Dr. Glenn Brown   Primary Physician Follow Up Scheduled   Surgeon Dr. Leung   Surgeon Follow Up NA   Cardiologist Dr. Cortez   Cardiologist Follow Up Scheduled   Ejection Fraction 30-35%   Risk Stratification High   Summary of Cath Report   Summary of Cath Report Available   Date Performed 05/01/17   Left Main mild luminal irregularities   LAD (stents)   D1 jailed by the LAD stents, but has EBER 3 flow with disease to 30% stenosis   LCX occluded at ostium   Ramus no longer present   RCA patent stents extending from the proximal to mid RCA, mRCA has 50%, dRCA 10%   PDA RPDA has widely patent stent, remainder of artery has mild disease   Living and Work Status    Living Arrangements and Social Status house   Support System Live with an adult   Return to Employment No   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 No   Have you fallen and had an injury in the past year? No   Referral Initiated to Physical Therapy Patient currently participating   Comment 5/31/17 Patient is currently attending PT, OT and speech therapy   Pain   Patient Currently in Pain Yes   Pain Location upper leg   Pain Rating 2/10   Pain Description Ache   Pain Description Comment 5/31/17 Patient reports his legs ache, which he feels is related to muscular dysptrophy/weakness from being in the hospital   Physical Assessments   Incisions WNL   Edema None   Right Lung Sounds normal   Left Lung Sounds normal   Limitations Other (see comments)  (mitral valve clip 5/2)   Individualized Treatment Plan   Monitored Sessions Scheduled 16   Monitored Sessions Attended 7   Oxygen   Supplemental Oxygen needed No   Nutrition Management - Weight Management   Assessment Re-assessment   Age 51   Weight 53.5 kg (118 lb)   Height 1.575 m (5' 2.01\")   BMI (Calculated) 21.62   Initial Rate Your Plate Score. Dietary tool to assess eating patterns. Scores range from 24 to 72. The higher the score the healthier the eating pattern. (patient needs to complete survey)   Weight Management Comments 5/31/17 Patient is down 15# since coming into hospital. Patient is looking to maintain his weight.    Nutrition Management - Lipids   Lipids Labs Available   Date 03/22/17   Total Cholesterol 128   Triglycerides 76   HDL 36   LDL 77   Prescribed Lipid Medication Yes   Statin Intensity Intensity Not Indicated   Nutrition Management - Diabetes   Diabetes Type II   Do you Monitor BS at Home? Yes   Diabetes Medication Prescribed Yes, Insulin   Hb A1C Date: 04/07/17   Hb A1C Result: 7.1   Nutrition Management Summary   Dietary Recommendations Anticoagulation Therapy;Low Sodium;Diabetic   Stages of Change for Diet Compliance Action   Interventions Planned Educate on Weight " Management Principles;Educate about Signs/Symptoms and Treatment of Hypo/Hyperglycemia;Educate on Benefits of Exercise;Instruct on Label Reading   Nutrition Summary Comments 5/31/17 Patient reports he is following a diabetic, anticoagulation, low sodium diet. His wife cooks for him and watches what he eats closely.   Nutrition Target Outcome Hb A1C < 7.0   Psychosocial Management   Psychosocial Assessment Re-assessment   Is there history of clinical depression or increased risk of depression? No previous history   Current Level of Stress per Patient Report Mild   Current Coping Skills Uses Stress Management/Relaxation Techniques;Has Positive Support System   Initial Patient Health Questionnaire -9 Score (PHQ-9) for depression. 5-9 Minimal symptoms, 10-14 Minor depression, 15-19 Major depression, moderately severe, > 20 Major depression, severe  15   Initial [employer] COOP Survey score.  Quality of Life:   If total score > 25 review individual areas where patient rated a 4 or 5.  Consider patients current medical condition and what role that plays on the score.   Adjust treatment protocol to improve areas of concern.  Consider the following:  PHQ9 score, DASI, and re-assessment within the next 30 days to assist with developing treatments.  25   Stages of Change Maintenance   Interventions Planned Patient to verbalize understanding of behavioral assessment results;Reassess PHQ-9 and/or [employer] COOP Surveys if outside of defined limits   Psychosocial Comments 5/31/17 Patient reports his stress is well-managed. Patient denied feeling depressed when questioned. He did score an elevated score on PHQ-9. Patient will be reassessed within the month.    Psychosocial Target Outcome Identify absence or presence of depression using valid screening tool   Other Core Components - Hypertension   History of or Diagnosis of Hypertension Yes   Currently taking Anti-Hypertensives Yes;Ace Inhibitor   Other Core Components - Tobacco    History of Tobacco Use Yes   Quit Date or Planned Quit Date 11/01/16   Tobacco Use Status Former (Quit > 6 mo ago)   Tobacco Habit Cigarettes   Tobacco Use per Day (average) 1 PPD   Years of Tobacco Use 29   Stages of Change Maintenance   Tobacco Comments 5/31/17 Patient quit smoking when he was diagnosed with DMII last winter. Patient quit cold turkey and is very confident he will not smoke again. Patient is past the 6 month nivia and is now in the maintenance stage of change.   Other Core Components Summary   Interventions Planned Instruct patient on the DASH diet;List benefits of weight management;Educate on importance of monitoring daily weight;Instruct and educate to self manage Heart Failure symptoms;Educate on the use of 'Stop Light' tool   Activity/Exercise History   Activity/Exercise Assessment Re-assessment   Activity/Exercise Status prior to event? Was Physically Active   Number of Days Currently participating in Moderate Physical Activity? 0   Number of Days Currently performing  Aerobic Exercise (including rehab)? 7   Number of Minutes per Session Currently of Aerobic Exercise (average)? 30   Current Stage of Change (Physical Activity) Preparation   Current Stage of Change (Aerobic Exercise) Preparation   Patient Goals Goal #1;Goal #2   Goal #1 Description Patient will gain strength and endurance to be able to return to work 5 days per week working 8 hour shift.   Goal #1 Target Date 07/03/17   Goal #1 Progress Towards Goal 7/7/2017 Patient is returning to work this Sunday for 8 hour shifts. He is confident he can do it , but therapist is not convinced.  He is still working at  a low level of exercise here.   Goal #2 Description Patient will attend cardiac rehab 2 days per week to monitor CV response to exercise, to educate on CV related symptoms so patient is confident he's stable and can return to work.   Goal #2 Target Date 07/03/17   Goal #2 Progress Towards Goal 7/7/2017 Patient has only been  attending cardiac rehab for 1x per week.  He is at 4.5 Mets on the nustep and he is using 3 poound weights.  Instructed his to try to increase his weights and start doing so weight lifting at home.   Activity/Exercise Comments 5/31/17 Patient has been working on balance, walking and stair climbing in rehab.    Activity/Exercise Target Outcome An Accumulation of 150  Minutes of Aerobic Activity per Week   Exercise Assessment   6 Minute Walk Predicted - Gender Selection Male   6 Minute Walk Predicted (Male) 1780.7   6 Minute Walk Predicted (Female) 1945.95   Initial 6 Minute Walk Distance (Feet) 300 ft   Resting  bpm   Exercise  bpm   Post Exercise  bpm   Resting BP 96/70   Exercise /70   Post Exercise BP 98/70   Pre  mg/dL   Post  mg/dL   Effort Rating 6   Current MET Level 4.5   MET Level Goal 5-6  (Changed on 7/7/2017)   ECG Rhythm Sinus tachycardia   Ectopy PVCs   Current Symptoms Fatigue;Weakness   Limitations/Restrictions Other (see comments)  (mitral clip 5/2)   Exercise Prescription   Mode Nustep;Treadmill;Weights;Ambulation   Duration/Time 15-30 min;Intermittent bouts   Frequency 2 days/week   THR (85% of age predicted max HR) 145.35   OMNI Effort Rating (0-10 Scale) 4-6/10   Progression Continuous bouts;Progress peak intensity by 1/4 MET per week;Total exercise time of 30-45 minutes   Recommended Home Exercise   Type of Exercise Walking   Frequency (days per week) 2-3   Duration (minutes per session) 30-45 min   Effort Rating Recommended 4-6/10   30 Day Exercise Plan Pateint will add weight training to home exercise.   Current Home Exercise   Type of Exercise Walking   Frequency (days per week) 7   Duration (minutes per session) 30   Follow-up/On-going Support   Provider follow-up needed on the following No follow-up needed   Learning Assessment   Learner Patient   Primary Language Other  (Setswana)   Preferred Learning Style Listening;Demonstration   Barriers to  Learning Language   Patient Education   Education Comments 5/31/17 Patient will benefit from 1:1 education as patient needs

## 2017-08-02 NOTE — ADDENDUM NOTE
Encounter addended by: Donato Hall EP on: 8/2/2017  1:48 PM<BR>     Actions taken: Flowsheet data copied forward, Sign clinical note, Flowsheet accepted

## 2017-08-04 VITALS — WEIGHT: 118.13 LBS | HEIGHT: 62 IN | BODY MASS INDEX: 21.74 KG/M2

## 2017-08-04 ASSESSMENT — 6 MINUTE WALK TEST (6MWT)
FEMALE CALC: 1945.51
TOTAL DISTANCE WALKED (FT): 300
MALE CALC: 1780.36
PREDICTED: 1791.21
GENDER SELECTION: MALE

## 2017-08-04 NOTE — ADDENDUM NOTE
Encounter addended by: Faustina Salas, OT on: 8/4/2017  1:20 PM<BR>     Actions taken: Flowsheet data copied forward, Flowsheet accepted

## 2017-08-04 NOTE — ADDENDUM NOTE
Encounter addended by: Faustina Salas OT on: 8/4/2017  1:50 PM<BR>     Actions taken: Flowsheet accepted

## 2017-08-04 NOTE — ADDENDUM NOTE
Encounter addended by: Faustina Salas OT on: 8/4/2017  1:39 PM<BR>     Actions taken: Flowsheet accepted

## 2017-08-09 ENCOUNTER — TRANSFERRED RECORDS (OUTPATIENT)
Dept: HEALTH INFORMATION MANAGEMENT | Facility: CLINIC | Age: 52
End: 2017-08-09

## 2017-08-10 ENCOUNTER — CARE COORDINATION (OUTPATIENT)
Dept: CARDIOLOGY | Facility: CLINIC | Age: 52
End: 2017-08-10

## 2017-08-10 ENCOUNTER — RADIANT APPOINTMENT (OUTPATIENT)
Dept: CARDIOLOGY | Facility: CLINIC | Age: 52
End: 2017-08-10
Attending: INTERNAL MEDICINE

## 2017-08-10 ENCOUNTER — OFFICE VISIT (OUTPATIENT)
Dept: CARDIOLOGY | Facility: CLINIC | Age: 52
End: 2017-08-10
Attending: INTERNAL MEDICINE
Payer: COMMERCIAL

## 2017-08-10 VITALS
SYSTOLIC BLOOD PRESSURE: 128 MMHG | WEIGHT: 119.9 LBS | DIASTOLIC BLOOD PRESSURE: 88 MMHG | BODY MASS INDEX: 22.07 KG/M2 | HEIGHT: 62 IN | OXYGEN SATURATION: 100 % | HEART RATE: 109 BPM

## 2017-08-10 DIAGNOSIS — I34.0 MITRAL VALVE INSUFFICIENCY, UNSPECIFIED ETIOLOGY: ICD-10-CM

## 2017-08-10 DIAGNOSIS — I34.0 MITRAL VALVE INSUFFICIENCY, UNSPECIFIED ETIOLOGY: Primary | ICD-10-CM

## 2017-08-10 PROCEDURE — 99214 OFFICE O/P EST MOD 30 MIN: CPT | Mod: 25 | Performed by: INTERNAL MEDICINE

## 2017-08-10 ASSESSMENT — PAIN SCALES - GENERAL: PAINLEVEL: NO PAIN (0)

## 2017-08-10 NOTE — NURSING NOTE
Chief Complaint   Patient presents with     Follow Up For     2 month f/u     Vitals were taken and medications were reconciled.     Corbin Henriquez, RMA  9:54 AM

## 2017-08-10 NOTE — MR AVS SNAPSHOT
After Visit Summary   8/10/2017    Luke Henao    MRN: [id number]           Patient Information     Date Of Birth          1965        Visit Information        Provider Department      8/10/2017 10:00 AM Ron Cortez MD [facility]        Today's Diagnoses     Mitral valve insufficiency, unspecified etiology    -  1      Care Instructions    1.  We will get you scheduled to be followed in the heart failure clinic (CORE) for close monitoring of your heart medications.    2.  Consultation for an ICD.   You will be scheduled with Dr. Patel on August 14 at 4:20 pm.    3.  Follow up appointment with Dr. Cortez in May 2018, with echo, labs and 6 minute walk prior.    Nursing questions: [phone] option 1 then chose option 3 for the triage nurse, and then ask to speak with Mariah.  For emergencies call 911.    It was a pleasure to see you in clinic.  If you have any questions at all, please feel free to give me a call.      Mariah Philip RN  Structural Heart Care Coordinator   TAVR and MitraClip Programs  [facility]  Office: [phone]    Clinics and Surgery Center  [address] [place] [address]  Cardiology Clinic [facility] [address] [place] [address]              Follow-ups after your visit        Follow-up notes from your care team     Return in about 9 months (around 5/1/2018) for Mitral valve regurg, w/Dr. Cortez.      Your next 10 appointments already scheduled     Aug 11, 2017 12:45 PM CDT   Cardiac Treatment with  Cardiac Rehab 1   [facility] ([facility])    [address], [address]  [facility] [address] [phone]            Aug 14, 2017  8:30 AM CDT   Anticoagulation Visit with  ANTICOAGULATION CLINIC   [facility] ([facility])    [address] [address] [phone]            Aug 14, 2017  4:20 PM CDT   (Arrive by 4:05 PM)   NEW ARRHYTHMIA  with Price Patel MD   [facility] ([facility])    [address]  [address] [facility] [address]   [phone]            Sep 05, 2017 10:20 AM CDT   (Arrive by 10:00 AM)   Return Visit with Rolly Victoria MD   [facility] ([facility])    [address]  [address]  [facility] [address]   [phone]            Sep 11, 2017  8:30 AM CDT   Lab with  LAB   [facility] ([facility])    [address]  [address] [facility] [address] [phone]            Sep 11, 2017  9:00 AM CDT   (Arrive by 8:45 AM)   CORE NEW with Emily Collado NP   [facility] ([facility])    [address] [place] [address] [phone]            Nov 01, 2017 11:30 AM CDT   Lab with  LAB   [facility] ([facility])    [address] [address]   [phone]            Nov 01, 2017 12:30 PM CDT   (Arrive by 12:00 PM)   New Patient Visit with Karen Proctor MD   [facility] ([facility])    [address] [place] [address] [phone]              Future tests that were ordered for you today     Open Future Orders        Priority Expected Expires Ordered    6 minute walk Routine  2/6/2018 8/10/2017    CBC with platelets Routine  8/10/2018 8/10/2017    Basic metabolic panel Routine  8/10/2018 8/10/2017    EKG 12-lead, tracing only (Future) Routine  12/8/2017 8/10/2017    Echocardiogram Complete Routine  8/10/2018 8/10/2017            Who to contact     If you have questions or need follow up information about today's clinic visit or your schedule please contact [facility] directly at [phone].  Normal or non-critical lab and imaging results will be communicated to you by Maribel  "letter or phone within 4 business days after the clinic has received the results. If you do not hear from us within 7 days, please contact the clinic through PaeDae or phone. If you have a critical or abnormal lab result, we will notify you by phone as soon as possible.  Submit refill requests through PaeDae or call your pharmacy and they will forward the refill request to us. Please allow 3 business days for your refill to be completed.          Additional Information About Your Visit        PaeDae Information     PaeDae lets you send messages to your doctor, view your test results, renew your prescriptions, schedule appointments and more. To sign up, go to [email or web address] . Click on \"Log in\" on the left side of the screen, which will take you to the Welcome page. Then click on \"Sign up Now\" on the right side of the page.     You will be asked to enter the access code listed below, as well as some personal information. Please follow the directions to create your username and password.     Your access code is: [email or web address]  Expires: 10/1/2017  3:25 PM     Your access code will  in 90 days. If you need help or a new code, please call your [place] clinic or [phone].        Care EveryWhere ID     This is your Care EveryWhere ID. This could be used by other organizations to access your [place] medical records  [id number]        Your Vitals Were     Pulse Height Pulse Oximetry BMI (Body Mass Index)          109 1.575 m (5' 2\") 100% 21.93 kg/m2         Blood Pressure from Last 3 Encounters:   08/10/17 128/88   17 92/58   17 (!) 69/47    Weight from Last 3 Encounters:   08/10/17 54.4 kg (119 lb 14.4 oz)   17 53.6 kg (118 lb 2.1 oz)   17 53.9 kg (118 lb 12.8 oz)               Primary Care Provider Office Phone # Fax #    Shanna Church [phone]       [address] [place] [address]        Equal Access to Services     CUBA AYALA AH: Enedelia el " alex Justin, waalfonzoda luqadaha, qaybta kayandel kinney, karen ivey ashleyjo elvistrupti richeyjoann silas. So [facility] [phone].    ATENCIÓN: Si habla will, tiene a suarez disposición servicios gratuitos de asistencia lingüística. Jannette al [phone].    We comply with applicable federal civil rights laws and [place] laws. We do not discriminate on the basis of race, color, national origin, age, disability sex, sexual orientation or gender identity.            Thank you!     Thank you for choosing [facility]  for your care. Our goal is always to provide you with excellent care. Hearing back from our patients is one way we can continue to improve our services. Please take a few minutes to complete the written survey that you may receive in the mail after your visit with us. Thank you!             Your Updated Medication List - Protect others around you: Learn how to safely use, store and throw away your medicines at www.disposemymeds.org.          This list is accurate as of: 8/10/17 11:36 AM.  Always use your most recent med list.                   Brand Name Dispense Instructions for use Diagnosis    * acetaminophen 325 MG tablet    TYLENOL    100 tablet    Take 2 tablets (650 mg) by mouth every 4 hours as needed for mild pain or fever    Coronary artery disease involving native coronary artery of native heart without angina pectoris       * acetaminophen 500 MG tablet    TYLENOL    30 tablet    Take 2 tablets (1,000 mg) by mouth every 6 hours as needed for mild pain    Lower extremity pain, bilateral, Shakiness       alcohol swab prep pads     100 each    Use to swab area of injection/mary alice as directed 3 x per day    Diabetes mellitus type 2, insulin dependent (H)       ascorbic acid 500 MG Tabs     2 tablet    Take 1 tablet (500 mg) by mouth daily    Coronary artery disease involving native coronary artery of native heart without angina pectoris       aspirin 81 MG EC tablet     90 tablet    Take 1  tablet (81 mg) by mouth daily    Cardiogenic shock (H), Type 2 diabetes mellitus without complication, with long-term current use of insulin (H)       ASPIRIN NOT PRESCRIBED    INTENTIONAL    0 each    Please choose reason not prescribed, below    Cardiogenic shock (H), Type 2 diabetes mellitus without complication, with long-term current use of insulin (H)       atorvastatin 40 MG tablet    LIPITOR    60 tablet    1 tablet (40 mg) by Oral or Feeding Tube route daily    Coronary artery disease involving native coronary artery of native heart without angina pectoris, Cardiogenic shock (H)       baclofen 10 MG tablet    LIORESAL    90 tablet    Take 1 tablet (10 mg) by mouth 3 times daily    Lower limb symptom       beta carotene 84241 UNIT capsule     2 capsule    Take 1 capsule (25,000 Units) by mouth daily    Ischemic cardiomyopathy       blood glucose monitoring test strip    no brand specified    100 strip    Use to test blood sugar 3 times daily or as directed.    Diabetes mellitus type 2, insulin dependent (H)       bumetanide 0.5 MG tablet    BUMEX    120 tablet    Take 1 tablet (0.5 mg) by mouth daily        cetirizine 10 MG tablet    zyrTEC    60 tablet    Take 1 tablet (10 mg) by mouth daily    Seasonal allergic rhinitis       clopidogrel 75 MG tablet    PLAVIX    60 tablet    Take 1 tablet (75 mg) by mouth daily    ST elevation myocardial infarction (STEMI), unspecified artery (H)       digoxin 125 MCG tablet    LANOXIN    60 tablet    Take 1 tablet (125 mcg) by mouth daily    Coronary artery disease involving native coronary artery of native heart without angina pectoris, Cardiogenic shock (H)       finasteride 5 MG tablet    PROSCAR    60 tablet    Take 1 tablet (5 mg) by mouth daily    Hypertrophy of prostate with urinary obstruction       insulin glargine 100 UNIT/ML injection    LANTUS    9 mL    Inject 25 Units Subcutaneous every morning (before breakfast)    Diabetes mellitus type 2, insulin  dependent (H)       insulin pen needle 32G X 4 MM    BD KYLEE U/F    100 each    Use 3 daily or as directed.    Diabetes mellitus type 2, insulin dependent (H)       metoprolol 25 MG 24 hr tablet    TOPROL-XL    45 tablet    Take 0.5 tablets (12.5 mg) by mouth daily    Mitral valve insufficiency, unspecified etiology       pantoprazole 40 MG EC tablet    PROTONIX    60 tablet    Take 1 tablet (40 mg) by mouth daily    Gastrointestinal hemorrhage associated with other gastritis       potassium chloride SA 20 MEQ CR tablet    K-DUR/KLOR-CON M    120 tablet    Take 1 tablet (20 mEq) by mouth 2 times daily    Ischemic cardiomyopathy       Sharps Container Misc     1 each    1 each every 30 days    Diabetes mellitus type 2, insulin dependent (H)       tamsulosin 0.4 MG capsule    FLOMAX    60 capsule    Take 1 capsule (0.4 mg) by mouth daily    Ischemic cardiomyopathy       * Warfarin Therapy Reminder      1 each continuous prn    Deep vein thrombosis (DVT) of left lower extremity, unspecified chronicity, unspecified vein (H)       * warfarin 3 MG tablet    COUMADIN    100 tablet    Take one tablet daily as directed by the ACC    Deep vein thrombosis (DVT) of left lower extremity, unspecified chronicity, unspecified vein (H)       zinc sulfate 220 (50 ZN) MG capsule    ZINCATE    2 capsule    Take 1 capsule (220 mg) by mouth daily    Ischemic cardiomyopathy       * Notice:  This list has 4 medication(s) that are the same as other medications prescribed for you. Read the directions carefully, and ask your doctor or other care provider to review them with you.

## 2017-08-10 NOTE — PROGRESS NOTES
Pt had repeat echo, follow up with Dr Cortez today.   Recommending ICD implant    Scheduled consult with Dr Patel on Monday 8/14  At 420

## 2017-08-10 NOTE — LETTER
8/10/2017      RE: Luke Henao  [address]  [facility] [address]       Dear Colleague,    Thank you for the opportunity to participate in the care of your patient, Luke Henao, at the [facility]. Please see a copy of my visit note below.        CARDIOLOGY CONSULTATION      ASSESSMENT AND PLAN:  We had the pleasure of meeting Luke Henao today, as you know he/she is a 52 year old male with ischemic cardiomyopathy with reduce EF, s/p complex percutaneous revascularization, and hx severe ischemic MR s/p MitralClip placement with marked improvement in symptoms who being seen today for evaluation of ongoing cardiac problems. Mr. Henao reports feeling well today, he has started working again and does not notice any limitations. He is tolerating all his medications and denies any ongoing dizzyness or dyspnea. We are very happy to see how well Mr. Henao has recovered.     Mr. Henao TTE today showed mild MR with mean PG across MV of 5mmhg at 104. He continues to have akinesis of multiple LV segments with a residual EF of 32% (calculated biplane). At this time we should continue further uptitration of guideline directed therapy with plan to follow up with EP for possible ICD placement.     # Ischemic Cardiomyopathy, EF 32%, NYHA class 1. Well compensated today  -- Follow up with [facility] for uptitration of meds  -- Conitnue toprol, digoxin   -- Patient has been holding Acei and ARB do to renal dysfunction  -- contineu atorvastatin, and plavix and warfarin  -- ICD referral    # MR s/p MitralClip  Good residual function  No chages at this time    # DVT  Continue warfarin    Patient seen with Dr. Cortez today    Kirby Rodriguez MD   Cardiology Fellow    Patient examined, chart reviewed and the above reflects our joint assessment and plans.      Ron Cortez MD    HPI: Mr. Luke Henao is a 51-year-old [place] gentleman with Hx of tobacco use and DM, who presented to  [facility] 03/26 with RCA STEMI transferred to [facility] after POBA, for further workup and management of cardiogenic shock, which led to a 6-week hospital stay  (3/29/17-5/12/17), revascularization with  DESx7 to RCA (culprit), LCx (, now closed) and LAD on 3/27, with refractory cardiogenic shock requiring intraaortic balloon pump, eventually subclavian balloon pump, multiple episodes of PEDRO, sepsis, sacral ulcer, DVT on a/c, b/l hemispheric infarcts likely due to watershed ischemia,  eventually refractory CS was attributed to ischemic MR on iCM (EF25-30%), and pt was treated with percutaneous MV repair with MitraClip (5/1/17), which led to reduction in MR from severe to mild, and substantial clinic improvement, weaning of IABP, and eventual discharge to rehabilitation, where pt stayed from 5/12/17 -5/26/17. Mr Henao seems to have made excellent progress in rehabilitation, and has now been living at home for the past 2 weeks. He presents to clinic with his wife and an interpretor, and seems to be in excellent spirits. His exercise tolerance is essentially not limited by dyspnea, and he has no signs of volume overload. His echo today showed MitraClip in appropriate position, minimal MR, acceptable inflow gradient, and moderately reduced EF (30-35%) with inferior akinesis.     His diuretics and acei were reportedly adjusted, but the patient does not know the exact dose at this time. His labs today show near complete recovery in renal function, and acceptable electrolytes. A beta blocker was intentionally withheld on discharge, so we will plan to start one today since pt has no signs of shock or volume overload.     Patient recently started working again. Feels no limitations. Doing well.    The patient denies a history of chest discomfort, dyspnea, PND, orthopnea, pedal edema, palpitations, lightheadedness, and syncope.      PAST MEDICAL HISTORY:  Past Medical History:    Diagnosis Date     History of thrombophlebitis        CURRENT MEDICATIONS:  Current Outpatient Prescriptions   Medication Sig Dispense Refill     atorvastatin (LIPITOR) 40 MG tablet 1 tablet (40 mg) by Oral or Feeding Tube route daily 60 tablet 7     cetirizine (ZYRTEC) 10 MG tablet Take 1 tablet (10 mg) by mouth daily 60 tablet 11     clopidogrel (PLAVIX) 75 MG tablet Take 1 tablet (75 mg) by mouth daily 60 tablet 11     digoxin (LANOXIN) 125 MCG tablet Take 1 tablet (125 mcg) by mouth daily 60 tablet 0     finasteride (PROSCAR) 5 MG tablet Take 1 tablet (5 mg) by mouth daily 60 tablet 11     pantoprazole (PROTONIX) 40 MG EC tablet Take 1 tablet (40 mg) by mouth daily 60 tablet 11     potassium chloride SA (K-DUR/KLOR-CON M) 20 MEQ CR tablet Take 1 tablet (20 mEq) by mouth 2 times daily 120 tablet 11     warfarin (COUMADIN) 3 MG tablet Take one tablet daily as directed by the  tablet 0     insulin glargine (LANTUS) 100 UNIT/ML injection Inject 25 Units Subcutaneous every morning (before breakfast) 9 mL 4     baclofen (LIORESAL) 10 MG tablet Take 1 tablet (10 mg) by mouth 3 times daily 90 tablet 3     bumetanide (BUMEX) 0.5 MG tablet Take 1 tablet (0.5 mg) by mouth daily 120 tablet 0     acetaminophen (TYLENOL) 500 MG tablet Take 2 tablets (1,000 mg) by mouth every 6 hours as needed for mild pain 30 tablet 0     metoprolol (TOPROL-XL) 25 MG 24 hr tablet Take 0.5 tablets (12.5 mg) by mouth daily 45 tablet 3     aspirin 81 MG EC tablet Take 1 tablet (81 mg) by mouth daily 90 tablet 3     ASPIRIN NOT PRESCRIBED (INTENTIONAL) Please choose reason not prescribed, below 0 each 0     acetaminophen (TYLENOL) 325 MG tablet Take 2 tablets (650 mg) by mouth every 4 hours as needed for mild pain or fever 100 tablet 0     ascorbic acid 500 MG TABS Take 1 tablet (500 mg) by mouth daily 2 tablet 0     beta carotene 04284 UNIT capsule Take 1 capsule (25,000 Units) by mouth daily 2 capsule 0     tamsulosin (FLOMAX) 0.4 MG  capsule Take 1 capsule (0.4 mg) by mouth daily 60 capsule 0     zinc sulfate (ZINCATE) 220 (50 ZN) MG capsule Take 1 capsule (220 mg) by mouth daily 2 capsule 0     blood glucose monitoring (NO BRAND SPECIFIED) test strip Use to test blood sugar 3 times daily or as directed. 100 strip 6     alcohol swab prep pads Use to swab area of injection/mary alice as directed 3 x per day 100 each 3     Sharps Container MISC 1 each every 30 days 1 each 0     insulin pen needle (BD KYLEE U/F) 32G X 4 MM Use 3 daily or as directed. 100 each prn     Warfarin Therapy Reminder 1 each continuous prn         PAST SURGICAL HISTORY:  Past Surgical History:   Procedure Laterality Date     COLONOSCOPY N/A 4/17/2017    Procedure: COLONOSCOPY;  Surgeon: Rashaad Bundy MD;  Location: [facility]     INSERT INTRAAORTIC BALLOON PUMP Right 4/19/2017    Procedure: INSERT INTRAAORTIC BALLOON PUMP;  Right Subclavian Intra Aortic Balloon Pump Insertion using Maquet 40cc Ballon Catheter, Implentation of 8mm Gelweave Woven Vascular Prosthesis, Removal of Left Femoral Ballon Pump Catheter, Flouroscopy;  Surgeon: Keshav Leung MD;  Location: [facility] OR     PERCUTANEOUS MITRAL VALVE REPAIR N/A 5/1/2017    Procedure: PERCUTANEOUS MITRAL VALVE REPAIR ANESTHESIA;  Mitraclip Procedure Possible Cardiopulmonary Bypass ;  Surgeon: Ron Cortez MD;  Location: [facility] OR     SUBCLAVIAN AORTIC VALVE IMPLANT N/A 5/8/2017    Procedure: SUBCLAVIAN AORTIC VALVE IMPLANT;  Right Subclavian Graft Removal ;  Surgeon: Keshav Leung MD;  Location: [facility] OR       ALLERGIES  Review of patient's allergies indicates no known allergies.    FAMILY HX:  No family history on file.    SOCIAL HX:  Social History     Social History     Marital status:      Spouse name: N/A     Number of children: N/A     Years of education: N/A     Social History Main Topics     Smoking status: Former Smoker     Packs/day: 0.50     Types: Cigarettes     Smokeless tobacco: Not on  "file     Alcohol use No     Drug use: No     Sexual activity: Not Currently     Partners: Female     Other Topics Concern     Not on file     Social History Narrative       ROS:  Constitutional: No fever, chills, or sweats. No weight gain/loss.   ENT: No visual disturbance, ear ache, epistaxis, sore throat.   Allergies/Immunologic: Negative.   Respiratory: No cough, hemoptysis.   Cardiovascular: As per HPI.   GI: No nausea, vomiting, hematemesis, melena, or hematochezia.   : No urinary frequency, dysuria, or hematuria.   Integument: Negative.   Psychiatric: Negative.   Neuro: Negative.   Endocrinology: Negative.   Musculoskeletal: No myalgia.    VITAL SIGNS:  /88 (BP Location: Left arm, Patient Position: Chair, Cuff Size: Adult Regular)  Pulse 109  Ht 1.575 m (5' 2\")  Wt 54.4 kg (119 lb 14.4 oz)  SpO2 100%  BMI 21.93 kg/m2  Body mass index is 21.93 kg/(m^2).  Wt Readings from Last 2 Encounters:   08/10/17 54.4 kg (119 lb 14.4 oz)   08/04/17 53.6 kg (118 lb 2.1 oz)       PHYSICAL EXAM  GEN:in no acute distress.    HEENT: Unremarkable.  Neck: JVP normal.  Carotids +4/4 bilaterally without bruits.  Lungs: CTA.    Cor: RRR. Normal S1 and S2.  No murmur, rub, or gallop.  PMI in Lf 5th ICS.    Abd: Soft, nontender, nondistended.  N  ABS.  No pulsatile mass.    Extremities: No C/C/E.  Pulses +4/4 symmetric in upper and lower extremities.    Neuro: Grossly intact.    LABS    Lab Results   Component Value Date    WBC 12.4 06/23/2017     Lab Results   Component Value Date    RBC 4.41 06/23/2017     Lab Results   Component Value Date    HGB 13.0 06/23/2017     Lab Results   Component Value Date    HCT 39.8 06/23/2017     No components found for: MCT  Lab Results   Component Value Date    MCV 90 06/23/2017     Lab Results   Component Value Date     06/23/2017      Recent Labs   Lab Test  07/12/17   0930  07/03/17   1559   NA  140  139   POTASSIUM  4.0  4.8   CHLORIDE  104  104   CO2  29  27   ANIONGAP  7  8 "   GLC  102*  171*   BUN  27  23   CR  1.25  1.26*   ROB  9.3  9.1     Recent Labs   Lab Test  03/27/17   0551   CHOL  128   HDL  36*   LDL  77   TRIG  76   NHDL  92        EKG:      ECHO:   ECHO 5/2/17  Interpretation Summary  S/p trans-catheter expi-xg-nldy mitral valve repair (TMVR). There is less than  mild residual mitral regurgitation. The mean gradient is 4.7 mmHg at a heart  rate in excess of 115 bpm.  Dilation of the inferior vena cava is present with normal respiratory  variation in diameter. Estimated mean right atrial pressure is 3-8 mmHg.  No pericardial effusion is present.     ECHO 6/8/17  Interpretation Summary  Moderately (EF 30-35%) reduced left ventricular function is present. Inferior  wall akinesis. Basal and mid inferoseptal and inferolateral hypokinesis.  Global right ventricular function is mildly reduced.  S/p trans-catheter tyxp-do-aqba mitral valve repair (TMVR). Mean gradient of  3.4 mmHg at a HR of 96 bpm. Trace to mild mitral insufficiency is present.  The inferior vena cava was normal in size with preserved respiratory  variability.  No pericardial effusion is present.  Compared to study from 5/2/17 there is no significant change.    08/10/17  Interpretation Summary  Moderately (EF 30-35%) reduced left ventricular function is present (bi-plane  32%). Basal lateral, inferior, and inferoseptal hypokinesis.  Global RV function mildly reduced.  S/p trans-catheter MVR 6/2017. Mild mitral insufficiency is present. Mean  valve gradient 5 mmHg at a heart rate of 104 bpm.  Right ventricular systolic pressure is 40mmHg above the right atrial pressure.  The inferior vena cava is normal in size with preserved respiratory  variability.  No pericardial effusion is present.      Shanna Church      Please do not hesitate to contact me if you have any questions/concerns.     Sincerely,     Ron Cortez MD

## 2017-08-10 NOTE — PATIENT INSTRUCTIONS
1.  We will get you scheduled to be followed in the heart failure clinic ([facility]) for close monitoring of your heart medications.    2.  Consultation for an ICD.   You will be scheduled with Dr. Patel on August 14 at 4:20 pm.    3.  Follow up appointment with Dr. Cortez in May 2018, with echo, labs and 6 minute walk prior.    Nursing questions: [phone] option 1 then chose option 3 for the triage nurse, and then ask to speak with Mariah.  For emergencies call 911.    It was a pleasure to see you in clinic.  If you have any questions at all, please feel free to give me a call.      Mariah Philip RN  Structural Heart Care Coordinator   TAVR and MitraClip Programs  [facility]  Office: [phone]    [facility]  [address] [place] [address]  Cardiology Clinic [address]  [place] [address]

## 2017-08-11 NOTE — PROGRESS NOTES
CARDIOLOGY CONSULTATION      ASSESSMENT AND PLAN:  We had the pleasure of meeting Luke Henao today, as you know he/she is a 52 year old male with ischemic cardiomyopathy with reduce EF, s/p complex percutaneous revascularization, and hx severe ischemic MR s/p MitralClip placement with marked improvement in symptoms who being seen today for evaluation of ongoing cardiac problems. Mr. Henao reports feeling well today, he has started working again and does not notice any limitations. He is tolerating all his medications and denies any ongoing dizzyness or dyspnea. We are very happy to see how well Mr. Henao has recovered.     Mr. Henao TTE today showed mild MR with mean PG across MV of 5mmhg at 104. He continues to have akinesis of multiple LV segments with a residual EF of 32% (calculated biplane). At this time we should continue further uptitration of guideline directed therapy with plan to follow up with EP for possible ICD placement.     # Ischemic Cardiomyopathy, EF 32%, NYHA class 1. Well compensated today  -- Follow up with [facility] for uptitration of meds  -- Conitnue toprol, digoxin   -- Patient has been holding Acei and ARB do to renal dysfunction  -- contineu atorvastatin, and plavix and warfarin  -- ICD referral    # MR s/p MitralClip  Good residual function  No chages at this time    # DVT  Continue warfarin    Patient seen with Dr. Cortez today    Kirby Rodriguez MD   Cardiology Fellow    Patient examined, chart reviewed and the above reflects our joint assessment and plans.      Ron Cortez MD    HPI: Mr. Luke Henao is a 51-year-old Burkin[place] gentleman with Hx of tobacco use and DM, who presented to [facility] 03/26 with RCA STEMI transferred to [facility] after POBA, for further workup and management of cardiogenic shock, which led to a 6-week hospital stay  (3/29/17-5/12/17), revascularization with  DESx7 to RCA (culprit), LCx (, now closed) and LAD on 3/27, with  refractory cardiogenic shock requiring intraaortic balloon pump, eventually subclavian balloon pump, multiple episodes of PEDRO, sepsis, sacral ulcer, DVT on a/c, b/l hemispheric infarcts likely due to watershed ischemia,  eventually refractory CS was attributed to ischemic MR on iCM (EF25-30%), and pt was treated with percutaneous MV repair with MitraClip (5/1/17), which led to reduction in MR from severe to mild, and substantial clinic improvement, weaning of IABP, and eventual discharge to rehabilitation, where pt stayed from 5/12/17 -5/26/17. Mr Henao seems to have made excellent progress in rehabilitation, and has now been living at home for the past 2 weeks. He presents to clinic with his wife and an interpretor, and seems to be in excellent spirits. His exercise tolerance is essentially not limited by dyspnea, and he has no signs of volume overload. His echo today showed MitraClip in appropriate position, minimal MR, acceptable inflow gradient, and moderately reduced EF (30-35%) with inferior akinesis.     His diuretics and acei were reportedly adjusted, but the patient does not know the exact dose at this time. His labs today show near complete recovery in renal function, and acceptable electrolytes. A beta blocker was intentionally withheld on discharge, so we will plan to start one today since pt has no signs of shock or volume overload.     Patient recently started working again. Feels no limitations. Doing well.    The patient denies a history of chest discomfort, dyspnea, PND, orthopnea, pedal edema, palpitations, lightheadedness, and syncope.      PAST MEDICAL HISTORY:  Past Medical History:   Diagnosis Date     History of thrombophlebitis        CURRENT MEDICATIONS:  Current Outpatient Prescriptions   Medication Sig Dispense Refill     atorvastatin (LIPITOR) 40 MG tablet 1 tablet (40 mg) by Oral or Feeding Tube route daily 60 tablet 7     cetirizine (ZYRTEC) 10 MG tablet Take 1 tablet (10 mg) by mouth  daily 60 tablet 11     clopidogrel (PLAVIX) 75 MG tablet Take 1 tablet (75 mg) by mouth daily 60 tablet 11     digoxin (LANOXIN) 125 MCG tablet Take 1 tablet (125 mcg) by mouth daily 60 tablet 0     finasteride (PROSCAR) 5 MG tablet Take 1 tablet (5 mg) by mouth daily 60 tablet 11     pantoprazole (PROTONIX) 40 MG EC tablet Take 1 tablet (40 mg) by mouth daily 60 tablet 11     potassium chloride SA (K-DUR/KLOR-CON M) 20 MEQ CR tablet Take 1 tablet (20 mEq) by mouth 2 times daily 120 tablet 11     warfarin (COUMADIN) 3 MG tablet Take one tablet daily as directed by the  tablet 0     insulin glargine (LANTUS) 100 UNIT/ML injection Inject 25 Units Subcutaneous every morning (before breakfast) 9 mL 4     baclofen (LIORESAL) 10 MG tablet Take 1 tablet (10 mg) by mouth 3 times daily 90 tablet 3     bumetanide (BUMEX) 0.5 MG tablet Take 1 tablet (0.5 mg) by mouth daily 120 tablet 0     acetaminophen (TYLENOL) 500 MG tablet Take 2 tablets (1,000 mg) by mouth every 6 hours as needed for mild pain 30 tablet 0     metoprolol (TOPROL-XL) 25 MG 24 hr tablet Take 0.5 tablets (12.5 mg) by mouth daily 45 tablet 3     aspirin 81 MG EC tablet Take 1 tablet (81 mg) by mouth daily 90 tablet 3     ASPIRIN NOT PRESCRIBED (INTENTIONAL) Please choose reason not prescribed, below 0 each 0     acetaminophen (TYLENOL) 325 MG tablet Take 2 tablets (650 mg) by mouth every 4 hours as needed for mild pain or fever 100 tablet 0     ascorbic acid 500 MG TABS Take 1 tablet (500 mg) by mouth daily 2 tablet 0     beta carotene 81748 UNIT capsule Take 1 capsule (25,000 Units) by mouth daily 2 capsule 0     tamsulosin (FLOMAX) 0.4 MG capsule Take 1 capsule (0.4 mg) by mouth daily 60 capsule 0     zinc sulfate (ZINCATE) 220 (50 ZN) MG capsule Take 1 capsule (220 mg) by mouth daily 2 capsule 0     blood glucose monitoring (NO BRAND SPECIFIED) test strip Use to test blood sugar 3 times daily or as directed. 100 strip 6     alcohol swab prep pads Use  to swab area of injection/mary alice as directed 3 x per day 100 each 3     Sharps Container MISC 1 each every 30 days 1 each 0     insulin pen needle (BD KYLEE U/F) 32G X 4 MM Use 3 daily or as directed. 100 each prn     Warfarin Therapy Reminder 1 each continuous prn         PAST SURGICAL HISTORY:  Past Surgical History:   Procedure Laterality Date     COLONOSCOPY N/A 4/17/2017    Procedure: COLONOSCOPY;  Surgeon: Rashaad Bundy MD;  Location: [facility]     INSERT INTRAAORTIC BALLOON PUMP Right 4/19/2017    Procedure: INSERT INTRAAORTIC BALLOON PUMP;  Right Subclavian Intra Aortic Balloon Pump Insertion using Maquet 40cc Ballon Catheter, Implentation of 8mm Gelweave Woven Vascular Prosthesis, Removal of Left Femoral Ballon Pump Catheter, Flouroscopy;  Surgeon: Keshav Leung MD;  Location: [facility] OR     PERCUTANEOUS MITRAL VALVE REPAIR N/A 5/1/2017    Procedure: PERCUTANEOUS MITRAL VALVE REPAIR ANESTHESIA;  Mitraclip Procedure Possible Cardiopulmonary Bypass ;  Surgeon: Ron Cortez MD;  Location: [facility] OR     SUBCLAVIAN AORTIC VALVE IMPLANT N/A 5/8/2017    Procedure: SUBCLAVIAN AORTIC VALVE IMPLANT;  Right Subclavian Graft Removal ;  Surgeon: Keshav Leung MD;  Location: [facility] OR       ALLERGIES  Review of patient's allergies indicates no known allergies.    FAMILY HX:  No family history on file.    SOCIAL HX:  Social History     Social History     Marital status:      Spouse name: N/A     Number of children: N/A     Years of education: N/A     Social History Main Topics     Smoking status: Former Smoker     Packs/day: 0.50     Types: Cigarettes     Smokeless tobacco: Not on file     Alcohol use No     Drug use: No     Sexual activity: Not Currently     Partners: Female     Other Topics Concern     Not on file     Social History Narrative       ROS:  Constitutional: No fever, chills, or sweats. No weight gain/loss.   ENT: No visual disturbance, ear ache, epistaxis, sore throat.  "  Allergies/Immunologic: Negative.   Respiratory: No cough, hemoptysis.   Cardiovascular: As per HPI.   GI: No nausea, vomiting, hematemesis, melena, or hematochezia.   : No urinary frequency, dysuria, or hematuria.   Integument: Negative.   Psychiatric: Negative.   Neuro: Negative.   Endocrinology: Negative.   Musculoskeletal: No myalgia.    VITAL SIGNS:  /88 (BP Location: Left arm, Patient Position: Chair, Cuff Size: Adult Regular)  Pulse 109  Ht 1.575 m (5' 2\")  Wt 54.4 kg (119 lb 14.4 oz)  SpO2 100%  BMI 21.93 kg/m2  Body mass index is 21.93 kg/(m^2).  Wt Readings from Last 2 Encounters:   08/10/17 54.4 kg (119 lb 14.4 oz)   08/04/17 53.6 kg (118 lb 2.1 oz)       PHYSICAL EXAM  GEN:in no acute distress.    HEENT: Unremarkable.  Neck: JVP normal.  Carotids +4/4 bilaterally without bruits.  Lungs: CTA.    Cor: RRR. Normal S1 and S2.  No murmur, rub, or gallop.  PMI in Lf 5th ICS.    Abd: Soft, nontender, nondistended.  N  ABS.  No pulsatile mass.    Extremities: No C/C/E.  Pulses +4/4 symmetric in upper and lower extremities.    Neuro: Grossly intact.    LABS    Lab Results   Component Value Date    WBC 12.4 06/23/2017     Lab Results   Component Value Date    RBC 4.41 06/23/2017     Lab Results   Component Value Date    HGB 13.0 06/23/2017     Lab Results   Component Value Date    HCT 39.8 06/23/2017     No components found for: MCT  Lab Results   Component Value Date    MCV 90 06/23/2017     Lab Results   Component Value Date     06/23/2017      Recent Labs   Lab Test  07/12/17   0930  07/03/17   1559   NA  140  139   POTASSIUM  4.0  4.8   CHLORIDE  104  104   CO2  29  27   ANIONGAP  7  8   GLC  102*  171*   BUN  27  23   CR  1.25  1.26*   ROB  9.3  9.1     Recent Labs   Lab Test  03/27/17   0551   CHOL  128   HDL  36*   LDL  77   TRIG  76   NHDL  92        EKG:      ECHO:   ECHO 5/2/17  Interpretation Summary  S/p trans-catheter oell-jn-wzaj mitral valve repair (TMVR). There is less " than  mild residual mitral regurgitation. The mean gradient is 4.7 mmHg at a heart  rate in excess of 115 bpm.  Dilation of the inferior vena cava is present with normal respiratory  variation in diameter. Estimated mean right atrial pressure is 3-8 mmHg.  No pericardial effusion is present.     ECHO 6/8/17  Interpretation Summary  Moderately (EF 30-35%) reduced left ventricular function is present. Inferior  wall akinesis. Basal and mid inferoseptal and inferolateral hypokinesis.  Global right ventricular function is mildly reduced.  S/p trans-catheter cwud-hi-oepy mitral valve repair (TMVR). Mean gradient of  3.4 mmHg at a HR of 96 bpm. Trace to mild mitral insufficiency is present.  The inferior vena cava was normal in size with preserved respiratory  variability.  No pericardial effusion is present.  Compared to study from 5/2/17 there is no significant change.    08/10/17  Interpretation Summary  Moderately (EF 30-35%) reduced left ventricular function is present (bi-plane  32%). Basal lateral, inferior, and inferoseptal hypokinesis.  Global RV function mildly reduced.  S/p trans-catheter MVR 6/2017. Mild mitral insufficiency is present. Mean  valve gradient 5 mmHg at a heart rate of 104 bpm.  Right ventricular systolic pressure is 40mmHg above the right atrial pressure.  The inferior vena cava is normal in size with preserved respiratory  variability.  No pericardial effusion is present.          Shanna Church

## 2017-08-14 ENCOUNTER — ANTICOAGULATION THERAPY VISIT (OUTPATIENT)
Dept: ANTICOAGULATION | Facility: CLINIC | Age: 52
End: 2017-08-14
Payer: COMMERCIAL

## 2017-08-14 DIAGNOSIS — I63.30 CEREBROVASCULAR ACCIDENT (CVA) DUE TO THROMBOSIS OF CEREBRAL ARTERY (H): ICD-10-CM

## 2017-08-14 LAB — INR POINT OF CARE: 2.9 (ref 0.86–1.14)

## 2017-08-14 PROCEDURE — 85610 PROTHROMBIN TIME: CPT | Mod: QW

## 2017-08-14 PROCEDURE — 36416 COLLJ CAPILLARY BLOOD SPEC: CPT

## 2017-08-14 PROCEDURE — 99207 ZZC NO CHARGE NURSE ONLY: CPT

## 2017-08-14 NOTE — PROGRESS NOTES
ANTICOAGULATION FOLLOW-UP CLINIC VISIT    Patient Name:  Luke Henao  Date:  8/14/2017  Contact Type:  Face to Face    SUBJECTIVE:     Patient Findings     Positives No Problem Findings           OBJECTIVE    INR Protime   Date Value Ref Range Status   08/14/2017 2.9 (A) 0.86 - 1.14 Final       ASSESSMENT / PLAN  INR assessment THER    Recheck INR In: 4 WEEKS    INR Location Clinic      Anticoagulation Summary as of 8/14/2017     INR goal 2.0-3.0   Today's INR 2.9   Maintenance plan 3 mg (3 mg x 1) every day   Full instructions 3 mg every day   Weekly total 21 mg   No change documented Shalonda Fitzgerald, RN   Plan last modified Shalonda Fitzgerald RN (6/13/2017)   Next INR check 9/12/2017   Target end date     Indications   Stroke (H) [I63.9]         Anticoagulation Episode Summary     INR check location     Preferred lab     Send INR reminders to  [facility]    Comments             See the Encounter Report to view Anticoagulation Flowsheet and Dosing Calendar (Go to Encounters tab in chart review, and find the Anticoagulation Therapy Visit)        Shalonda Fitzgerald RN

## 2017-08-14 NOTE — MR AVS SNAPSHOT
Javiertrish DEBBI Henao   8/14/2017 8:30 AM   Anticoagulation Therapy Visit    Description:  52 year old male   Provider:  [facility] [facility];  ANTICOAGULATION CLINIC   Department:   Anti Coagulation           INR as of 8/14/2017     Today's INR       Anticoagulation Summary as of 8/14/2017     INR goal 2.0-3.0   Today's INR    Full instructions 3 mg every day   Next INR check     Indications   Stroke (H) [I63.9]         Contact Numbers     [facility]  Please call  [phone] to cancel and/or reschedule your appointment   Please call  [phone] with any problems or questions regarding your therapy.        August 2017 Details    Sun Mon Tue Wed Thu Fri Sat       1               2               3               4               5                 6               7               8               9               10               11               12                 13               14      3 mg   See details      15      3 mg         16      3 mg         17      3 mg         18      3 mg         19      3 mg           20      3 mg         21      3 mg         22      3 mg         23      3 mg         24      3 mg         25      3 mg         26      3 mg           27      3 mg         28      3 mg         29      3 mg         30      3 mg         31      3 mg            Date Details   08/14 This INR check      Date of next INR: No date specified         How to take your warfarin dose     To take:  3 mg Take 1 of the 3 mg tablets.

## 2017-08-17 ENCOUNTER — OFFICE VISIT (OUTPATIENT)
Dept: INTERNAL MEDICINE | Facility: CLINIC | Age: 52
End: 2017-08-17
Payer: COMMERCIAL

## 2017-08-17 VITALS
OXYGEN SATURATION: 100 % | WEIGHT: 119.7 LBS | HEART RATE: 108 BPM | DIASTOLIC BLOOD PRESSURE: 60 MMHG | SYSTOLIC BLOOD PRESSURE: 100 MMHG | HEIGHT: 62 IN | BODY MASS INDEX: 22.03 KG/M2 | TEMPERATURE: 99 F

## 2017-08-17 DIAGNOSIS — I25.5 ISCHEMIC CARDIOMYOPATHY: ICD-10-CM

## 2017-08-17 DIAGNOSIS — Z79.4 TYPE 2 DIABETES MELLITUS WITHOUT COMPLICATION, WITH LONG-TERM CURRENT USE OF INSULIN (H): ICD-10-CM

## 2017-08-17 DIAGNOSIS — E11.9 TYPE 2 DIABETES MELLITUS WITHOUT COMPLICATION, WITH LONG-TERM CURRENT USE OF INSULIN (H): ICD-10-CM

## 2017-08-17 DIAGNOSIS — N28.9 RENAL INSUFFICIENCY: Primary | ICD-10-CM

## 2017-08-17 PROCEDURE — 99214 OFFICE O/P EST MOD 30 MIN: CPT | Performed by: INTERNAL MEDICINE

## 2017-08-17 RX ORDER — GLUCOSAMINE HCL/CHONDROITIN SU 500-400 MG
CAPSULE ORAL
Qty: 100 EACH | Refills: 11 | Status: SHIPPED | OUTPATIENT
Start: 2017-08-17 | End: 2018-10-01

## 2017-08-17 NOTE — NURSING NOTE
"Chief Complaint   Patient presents with     RECHECK     Follow up from Nephrology     Diabetes     Need refill on insulin and alcohol cleanser.       Initial /60 (BP Location: Left arm, Patient Position: Chair, Cuff Size: Adult Regular)  Pulse 108  Temp 99  F (37.2  C) (Oral)  Ht 5' 2\" (1.575 m)  Wt 119 lb 11.2 oz (54.3 kg)  SpO2 100%  BMI 21.89 kg/m2 Estimated body mass index is 21.89 kg/(m^2) as calculated from the following:    Height as of this encounter: 5' 2\" (1.575 m).    Weight as of this encounter: 119 lb 11.2 oz (54.3 kg).  Medication Reconciliation: complete     Kaminibose MA      "

## 2017-08-17 NOTE — PROGRESS NOTES
SUBJECTIVE:                                                      HPI: Luke Henao is a pleasant 52 year old male who presents for follow-up:    Khmer  utilized.     He feels very well and has no clinical concerns or complaints.    I wanted to touch base with him because of our previous struggles with his kidney function, blood pressure, and heart regimen.    Our last visit together was on 7/3/17.     Re: renal insufficiency:    Patient had a prolonged and complicated hospitalization earlier this year during which he had multiple episodes of PEDRO.    On discharge (6/8/17) his Cr was 1.05.    During a visit to  6/19/17 his Cr was noted to be 1.53.    When we rechecked his Cr at our initial visit (6/23/17), it was still elevated at 1.48. Additionally, patient was struggling with symptomatically low blood pressures (60s-70/40s-50s).    Patient's Bumex 0.5mg was DECREASED from bid to daily.    Patient's lisinopril 2.5mg was DECREASED from bid to daily as well.     Recheck on 6/30/17 demonstrated worsening renal function: Cr was 1.62.    Seen in clinic (by me) 7/3/17:    Spironolactone HELD.    Lisinopril HELD.     Patient was referred to nephrology. Per patient, repeat labs (8/9/17) demonstrated normal kidney function. No interventions performed or recommended. No further follow-up indicated.     Re: ischemic cardiomyopathy:    As above, spironolactone and lisinopril were held an Bumex 0.5mg was reduced to once daily.     Continued on Toprol 12.5mg daily.     Repeat TTE 8/10/17 demonstrated continued low EF (32%).     Plan is for ICD placement.     There was also mention of up-titration of his cardiac medications, but I would be cautious in light of his borderline blood pressure and tendency towards PEDRO.     ---    Finally, patient requests refills of his Lantus and alcohol swabs today.     ---    The medication, allergy, and problem lists have been reviewed and updated as appropriate.       OBJECTIVE:   "                                                    /60 (BP Location: Left arm, Patient Position: Chair, Cuff Size: Adult Regular)  Pulse 108  Temp 99  F (37.2  C) (Oral)  Ht 5' 2\" (1.575 m)  Wt 119 lb 11.2 oz (54.3 kg)  SpO2 100%  BMI 21.89 kg/m2  Constitutional: well-appearing  Psych: normal judgment and insight; normal mood and affect; recent and remote memory intact      ASSESSMENT/PLAN:                                                      (N28.9) Renal insufficiency  (primary encounter diagnosis)  Comment: per patient, kidney function as normalized.   Plan:    - would continue OFF of spironolactone and lisinopril for now.   - would continue Bumex 0.5mg daily for now.   - any up-titration or addition of medications needs to be done with caution:    - he has a tendency towards PEDRO (multiple episodes this year already).    - his blood pressure is borderline and hypotension may cause/contribute to PEDRO.   - aware that cardiac regimen needs to be optimized to improve forward flow to kidneys, but proceed cautiously.     (I25.5) Ischemic cardiomyopathy  Comment: still with reduced EF (32%).  Plan:    - referred for ICD placement.   - please see above re: heart regimen optimization.     (E11.9,  Z79.4) Type 2 diabetes mellitus without complication, with long-term current use of insulin (H)  Plan: refill of Lantus and alcohol swabs provided.     The instructions on the AVS were discussed and explained to the patient. Patient expressed understanding of instructions.    (Chart documentation was completed, in part, with [employer] voice-recognition software. Even though reviewed, some grammatical, spelling, and word errors may remain.)    Shanna Church MD   [address] [address]  T: [phone], F: [phone]    "

## 2017-08-17 NOTE — MR AVS SNAPSHOT
After Visit Summary   8/17/2017    Luke Henao    MRN: [id number]           Patient Information     Date Of Birth          1965        Visit Information        Provider Department      8/17/2017 1:15 PM Shanna Church MD; [employer] [facility]        Today's Diagnoses     Diabetes mellitus type 2, insulin dependent (H)          Care Instructions    Come back when able so we can review your history.    Otherwise, refills sent in.           Follow-ups after your visit        Your next 10 appointments already scheduled     Sep 05, 2017 10:15 AM CDT   (Arrive by 9:55 AM)   Return Visit with Rolly Victoria MD   [facility] ([facility])    [address]  [address]  [facility] [address]   [phone]            Sep 11, 2017  8:30 AM CDT   Lab with  LAB   [facility] ([facility])    [address] [place] [id number] [phone]            Sep 11, 2017  9:00 AM CDT   (Arrive by 8:45 AM)   CORE NEW with Emily Collado NP   [facility] ([facility])    [address] [place] [address]  [address] [facility] [address] [phone]            Sep 12, 2017  8:00 AM CDT   Anticoagulation Visit with [facility]   [facility] ([facility])    [address] [address]   [phone]            Oct 16, 2017  1:40 PM CDT   (Arrive by 1:25 PM)   NEW ARRHYTHMIA with Price Patel MD   [facility] ([facility])    [address] [place] [address]  [address] [facility] [address] [phone]            Nov 01, 2017 11:30 AM CDT   Lab with [email or web address]    [facility] ([facility])    [address] [place] [address] [phone]            Nov 01, 2017 12:30 PM  "CDT   (Arrive by 12:00 PM)   New Patient Visit with Karen Proctor MD   [facility] ([facility])    [address] [facility]  [address]rd [facility] [address] [phone]              Who to contact     If you have questions or need follow up information about today's clinic visit or your schedule please contact [facility] directly at [phone].  Normal or non-critical lab and imaging results will be communicated to you by MyChart, letter or phone within 4 business days after the clinic has received the results. If you do not hear from us within 7 days, please contact the clinic through MyChart or phone. If you have a critical or abnormal lab result, we will notify you by phone as soon as possible.  Submit refill requests through [employer] or call your pharmacy and they will forward the refill request to us. Please allow 3 business days for your refill to be completed.          Additional Information About Your Visit        [facility] Information     [employer] lets you send messages to your doctor, view your test results, renew your prescriptions, schedule appointments and more. To sign up, go to [email or web address] . Click on \"Log in\" on the left side of the screen, which will take you to the Welcome page. Then click on \"Sign up Now\" on the right side of the page.     You will be asked to enter the access code listed below, as well as some personal information. Please follow the directions to create your username and password.     Your access code is: [email or web address]  Expires: 10/1/2017  3:25 PM     Your access code will  in 90 days. If you need help or a new code, please call your [facility] clinic or [phone].        Care EveryWhere ID     This is your Care EveryWhere ID. This could be used by other organizations to access your [facility] medical records  [id number]        Your Vitals Were     Pulse Temperature Height Pulse Oximetry BMI " "(Body Mass Index)       108 99  F (37.2  C) (Oral) 5' 2\" (1.575 m) 100% 21.89 kg/m2        Blood Pressure from Last 3 Encounters:   08/17/17 100/60   08/10/17 128/88   07/03/17 92/58    Weight from Last 3 Encounters:   08/17/17 119 lb 11.2 oz (54.3 kg)   08/10/17 119 lb 14.4 oz (54.4 kg)   08/04/17 118 lb 2.1 oz (53.6 kg)              Today, you had the following     No orders found for display         Where to get your medicines      These medications were sent to [facility]/pharmacy #[id number] - [facility] [address] [address]     Phone:  [phone]     alcohol swab prep pads    insulin glargine 100 UNIT/ML injection          Primary Care Provider Office Phone # Fax #    Shanna Church [phone]       [address] [place] [address]        Equal Access to Services     [facility]: Hadii aad ku hadasho Soomaali, waaxda luqadaha, qaybta kaalmada adeegyada, karen nettles . So [facility] [place] [facility] [phone].    ATENCIÓN: Si habla español, tiene a suarez disposición servicios gratuitos de asistencia lingüística. Llame al [phone].    We comply with applicable federal civil rights laws and [place] laws. We do not discriminate on the basis of race, color, national origin, age, disability sex, sexual orientation or gender identity.            Thank you!     Thank you for choosing [facility]  for your care. Our goal is always to provide you with excellent care. Hearing back from our patients is one way we can continue to improve our services. Please take a few minutes to complete the written survey that you may receive in the mail after your visit with us. Thank you!             Your Updated Medication List - Protect others around you: Learn how to safely use, store and throw away your medicines at www.disposemymeds.org.          This list is accurate as of: 8/17/17  1:48 PM.  Always use your most recent med list.    "                Brand Name Dispense Instructions for use Diagnosis    acetaminophen 500 MG tablet    TYLENOL    30 tablet    Take 2 tablets (1,000 mg) by mouth every 6 hours as needed for mild pain    Lower extremity pain, bilateral, Shakiness       alcohol swab prep pads     100 each    Use to swab area of injection/mary alice as directed 3 x per day    Diabetes mellitus type 2, insulin dependent (H)       ascorbic acid 500 MG Tabs     2 tablet    Take 1 tablet (500 mg) by mouth daily    Coronary artery disease involving native coronary artery of native heart without angina pectoris       ASPIRIN NOT PRESCRIBED    INTENTIONAL    0 each    Please choose reason not prescribed, below    Cardiogenic shock (H), Type 2 diabetes mellitus without complication, with long-term current use of insulin (H)       atorvastatin 40 MG tablet    LIPITOR    60 tablet    1 tablet (40 mg) by Oral or Feeding Tube route daily    Coronary artery disease involving native coronary artery of native heart without angina pectoris, Cardiogenic shock (H)       baclofen 10 MG tablet    LIORESAL    90 tablet    Take 1 tablet (10 mg) by mouth 3 times daily    Lower limb symptom       beta carotene 98449 UNIT capsule     2 capsule    Take 1 capsule (25,000 Units) by mouth daily    Ischemic cardiomyopathy       blood glucose monitoring test strip    no brand specified    100 strip    Use to test blood sugar 3 times daily or as directed.    Diabetes mellitus type 2, insulin dependent (H)       bumetanide 0.5 MG tablet    BUMEX    120 tablet    Take 1 tablet (0.5 mg) by mouth daily        cetirizine 10 MG tablet    zyrTEC    60 tablet    Take 1 tablet (10 mg) by mouth daily    Seasonal allergic rhinitis       clopidogrel 75 MG tablet    PLAVIX    60 tablet    Take 1 tablet (75 mg) by mouth daily    ST elevation myocardial infarction (STEMI), unspecified artery (H)       digoxin 125 MCG tablet    LANOXIN    60 tablet    Take 1 tablet (125 mcg) by mouth daily     Coronary artery disease involving native coronary artery of native heart without angina pectoris, Cardiogenic shock (H)       finasteride 5 MG tablet    PROSCAR    60 tablet    Take 1 tablet (5 mg) by mouth daily    Hypertrophy of prostate with urinary obstruction       insulin glargine 100 UNIT/ML injection    LANTUS    18 mL    Inject 25 Units Subcutaneous every morning (before breakfast)    Diabetes mellitus type 2, insulin dependent (H)       insulin pen needle 32G X 4 MM    BD KYLEE U/F    100 each    Use 3 daily or as directed.    Diabetes mellitus type 2, insulin dependent (H)       metoprolol 25 MG 24 hr tablet    TOPROL-XL    45 tablet    Take 0.5 tablets (12.5 mg) by mouth daily    Mitral valve insufficiency, unspecified etiology       pantoprazole 40 MG EC tablet    PROTONIX    60 tablet    Take 1 tablet (40 mg) by mouth daily    Gastrointestinal hemorrhage associated with other gastritis       potassium chloride SA 20 MEQ CR tablet    K-DUR/KLOR-CON M    120 tablet    Take 1 tablet (20 mEq) by mouth 2 times daily    Ischemic cardiomyopathy       Sharps Container Misc     1 each    1 each every 30 days    Diabetes mellitus type 2, insulin dependent (H)       tamsulosin 0.4 MG capsule    FLOMAX    60 capsule    Take 1 capsule (0.4 mg) by mouth daily    Ischemic cardiomyopathy       * Warfarin Therapy Reminder      1 each continuous prn    Deep vein thrombosis (DVT) of left lower extremity, unspecified chronicity, unspecified vein (H)       * warfarin 3 MG tablet    COUMADIN    100 tablet    Take one tablet daily as directed by the [facility]    Deep vein thrombosis (DVT) of left lower extremity, unspecified chronicity, unspecified vein (H)       zinc sulfate 220 (50 ZN) MG capsule    ZINCATE    2 capsule    Take 1 capsule (220 mg) by mouth daily    Ischemic cardiomyopathy       * Notice:  This list has 2 medication(s) that are the same as other medications prescribed for you. Read the directions carefully, and  ask your doctor or other care provider to review them with you.

## 2017-08-22 DIAGNOSIS — I25.5 ISCHEMIC CARDIOMYOPATHY: Primary | ICD-10-CM

## 2017-08-22 RX ORDER — BUMETANIDE 0.5 MG/1
0.5 TABLET ORAL DAILY
Qty: 120 TABLET | Refills: 0 | Status: SHIPPED | OUTPATIENT
Start: 2017-08-22 | End: 2017-11-30

## 2017-08-22 NOTE — TELEPHONE ENCOUNTER
Bumetanide   bumetanid approved 7/3/17 for 120 pt threw away; does pt need to take these? [facility] on [address]      Last Written Prescription Date: 07/03/17  Last Fill Quantity: 120, # refills: 0  Last Office Visit with [facility], [facility] or [facility] prescribing provider: 08/17/17       Potassium   Date Value Ref Range Status   07/12/2017 4.0 3.4 - 5.3 mmol/L Final     Creatinine   Date Value Ref Range Status   07/12/2017 1.25 0.66 - 1.25 mg/dL Final     BP Readings from Last 3 Encounters:   08/17/17 100/60   08/10/17 128/88   07/03/17 92/58

## 2017-08-30 ENCOUNTER — PRE VISIT (OUTPATIENT)
Dept: CARDIOLOGY | Facility: CLINIC | Age: 52
End: 2017-08-30

## 2017-08-30 DIAGNOSIS — I50.22 CHRONIC SYSTOLIC HEART FAILURE (H): Primary | ICD-10-CM

## 2017-09-11 ENCOUNTER — OFFICE VISIT (OUTPATIENT)
Dept: CARDIOLOGY | Facility: CLINIC | Age: 52
End: 2017-09-11
Attending: NURSE PRACTITIONER
Payer: COMMERCIAL

## 2017-09-11 VITALS
HEART RATE: 103 BPM | WEIGHT: 122.8 LBS | BODY MASS INDEX: 22.6 KG/M2 | DIASTOLIC BLOOD PRESSURE: 66 MMHG | SYSTOLIC BLOOD PRESSURE: 95 MMHG | OXYGEN SATURATION: 100 % | HEIGHT: 62 IN

## 2017-09-11 DIAGNOSIS — E63.9 NUTRITIONAL DEFICIENCY: Primary | ICD-10-CM

## 2017-09-11 DIAGNOSIS — I25.5 ISCHEMIC CARDIOMYOPATHY: ICD-10-CM

## 2017-09-11 DIAGNOSIS — I34.0 MITRAL VALVE INSUFFICIENCY, UNSPECIFIED ETIOLOGY: ICD-10-CM

## 2017-09-11 DIAGNOSIS — I25.10 CORONARY ARTERY DISEASE INVOLVING NATIVE CORONARY ARTERY OF NATIVE HEART WITHOUT ANGINA PECTORIS: ICD-10-CM

## 2017-09-11 DIAGNOSIS — I50.22 CHRONIC SYSTOLIC HEART FAILURE (H): ICD-10-CM

## 2017-09-11 LAB
ANION GAP SERPL CALCULATED.3IONS-SCNC: 8 MMOL/L (ref 3–14)
BUN SERPL-MCNC: 25 MG/DL (ref 7–30)
CALCIUM SERPL-MCNC: 9.1 MG/DL (ref 8.5–10.1)
CHLORIDE SERPL-SCNC: 103 MMOL/L (ref 94–109)
CO2 SERPL-SCNC: 27 MMOL/L (ref 20–32)
CREAT SERPL-MCNC: 1.09 MG/DL (ref 0.66–1.25)
ERYTHROCYTE [DISTWIDTH] IN BLOOD BY AUTOMATED COUNT: 13.9 % (ref 10–15)
GFR SERPL CREATININE-BSD FRML MDRD: 72 ML/MIN/1.7M2
GLUCOSE SERPL-MCNC: 148 MG/DL (ref 70–99)
HCT VFR BLD AUTO: 35.3 % (ref 40–53)
HGB BLD-MCNC: 11 G/DL (ref 13.3–17.7)
MCH RBC QN AUTO: 29.4 PG (ref 26.5–33)
MCHC RBC AUTO-ENTMCNC: 31.2 G/DL (ref 31.5–36.5)
MCV RBC AUTO: 94 FL (ref 78–100)
NT-PROBNP SERPL-MCNC: 1599 PG/ML (ref 0–125)
PLATELET # BLD AUTO: 275 10E9/L (ref 150–450)
POTASSIUM SERPL-SCNC: 4.1 MMOL/L (ref 3.4–5.3)
RBC # BLD AUTO: 3.74 10E12/L (ref 4.4–5.9)
SODIUM SERPL-SCNC: 139 MMOL/L (ref 133–144)
WBC # BLD AUTO: 8.6 10E9/L (ref 4–11)

## 2017-09-11 PROCEDURE — 99213 OFFICE O/P EST LOW 20 MIN: CPT | Mod: ZP | Performed by: NURSE PRACTITIONER

## 2017-09-11 PROCEDURE — 36415 COLL VENOUS BLD VENIPUNCTURE: CPT | Performed by: INTERNAL MEDICINE

## 2017-09-11 PROCEDURE — 85027 COMPLETE CBC AUTOMATED: CPT | Performed by: INTERNAL MEDICINE

## 2017-09-11 PROCEDURE — 99214 OFFICE O/P EST MOD 30 MIN: CPT | Mod: ZF

## 2017-09-11 PROCEDURE — 80048 BASIC METABOLIC PNL TOTAL CA: CPT | Performed by: INTERNAL MEDICINE

## 2017-09-11 PROCEDURE — 83880 ASSAY OF NATRIURETIC PEPTIDE: CPT | Performed by: INTERNAL MEDICINE

## 2017-09-11 RX ORDER — BETA-CAROTENE 7500 MCG
25000 CAPSULE ORAL DAILY
Qty: 2 CAPSULE | Refills: 0 | Status: CANCELLED | OUTPATIENT
Start: 2017-09-11

## 2017-09-11 RX ORDER — METOPROLOL SUCCINATE 25 MG/1
25 TABLET, EXTENDED RELEASE ORAL DAILY
Qty: 90 TABLET | Refills: 3 | Status: SHIPPED | OUTPATIENT
Start: 2017-09-11 | End: 2017-09-26

## 2017-09-11 RX ORDER — ZINC SULFATE 50(220)MG
220 CAPSULE ORAL DAILY
Qty: 2 CAPSULE | Refills: 0 | Status: CANCELLED | OUTPATIENT
Start: 2017-09-11

## 2017-09-11 RX ORDER — ASCORBIC ACID 500 MG
500 TABLET ORAL DAILY
Qty: 2 TABLET | Refills: 0 | Status: CANCELLED | OUTPATIENT
Start: 2017-09-11

## 2017-09-11 ASSESSMENT — PAIN SCALES - GENERAL: PAINLEVEL: NO PAIN (0)

## 2017-09-11 NOTE — MR AVS SNAPSHOT
After Visit Summary   9/11/2017    Luke Henao    MRN: [id number]           Patient Information     Date Of Birth          1965        Visit Information        Provider Department      9/11/2017 8:45 AM Emily Collado NP; LANGUAGE [facility]        Today's Diagnoses     Ischemic cardiomyopathy        Coronary artery disease involving native coronary artery of native heart without angina pectoris        Mitral valve insufficiency, unspecified etiology          Care Instructions    You were seen today in the Cardiovascular Clinic at the [facility].     Cardiology Providers you saw during your visit: Emily Collado NP     1. Please increase Toprol to 25 mg (a full tablet) once daily    2. Please bring your prescription bottles with to your next visit    3. Please return to see Dr. Patel in October and return to CORE clinic in November    Results for LUKE HENAO (MRN [id number]) as of 9/11/2017 09:07   Ref. Range 9/11/2017 08:35   Sodium Latest Ref Range: 133 - 144 mmol/L 139   Potassium Latest Ref Range: 3.4 - 5.3 mmol/L 4.1   Chloride Latest Ref Range: 94 - 109 mmol/L 103   Carbon Dioxide Latest Ref Range: 20 - 32 mmol/L 27   Urea Nitrogen Latest Ref Range: 7 - 30 mg/dL 25   Creatinine Latest Ref Range: 0.66 - 1.25 mg/dL 1.09   GFR Estimate Latest Ref Range: >60 mL/min/1.7m2 72   GFR Estimate If Black Latest Ref Range: >60 mL/min/1.7m2 87   Calcium Latest Ref Range: 8.5 - 10.1 mg/dL 9.1   Anion Gap Latest Ref Range: 3 - 14 mmol/L 8   N-Terminal Pro Bnp Latest Ref Range: 0 - 125 pg/mL 1599 (H)   Glucose Latest Ref Range: 70 - 99 mg/dL 148 (H)   WBC Latest Ref Range: 4.0 - 11.0 10e9/L 8.6   Hemoglobin Latest Ref Range: 13.3 - 17.7 g/dL 11.0 (L)   Hematocrit Latest Ref Range: 40.0 - 53.0 % 35.3 (L)   Platelet Count Latest Ref Range: 150 - 450 10e9/L 275   RBC Count Latest Ref Range: 4.4 - 5.9 10e12/L 3.74 (L)   MCV Latest Ref Range: 78 - 100 fl 94   MCH Latest Ref  "Range: 26.5 - 33.0 pg 29.4   MCHC Latest Ref Range: 31.5 - 36.5 g/dL 31.2 (L)   RDW Latest Ref Range: 10.0 - 15.0 % 13.9       Please limit your fluid intake to 2 L (64 ounces) daily.  2 Liters a day = 8.5 cups, or 72 ounces.  Please limit your salt intake to 2 grams a day or less.     If you gain 2# in 24 hours or 5# in one week call Herminia Jiménez RN so we can adjust your medications as needed over the phone.     Please feel free to call me with any questions or concerns.       Herminia Jiménez RN BSN CHFN  [facility]  Cardiology Care Coordinator-Heart Failure Clinic     Questions and [phone].   First press #1 for the University and then press #3 for \"Medical Questions\" to reach us Cardiology Nurses.      On Call Cardiologist for after hours or on weekends: [phone]   option #4 and ask to speak to the on-call Cardiologist. Inform them you are a CORE/heart failure patient at the [facility].           If you need a medication refill please contact your pharmacy.  Please allow 3 business days for your refill to be completed.  _______________________________________________________  C.O.R.E. CLINIC Cardiomyopathy, Optimization, Rehabilitation, Education   The C.O.R.E. CLINIC is a heart failure specialty clinic within the [facility] where you will work with specialized nurse practitioners dedicated to helping patients with heart failure carefully adjust medications, receive education, and learn who and when to call if symptoms develop. They specialize in helping you better understand your condition, slow the progression of your disease, improve the length and quality of your life, help you detect future heart problems before they become life threatening, and avoid hospitalizations.  As always, thank you for trusting us with your health care needs!           Follow-ups after your visit        Your next 10 appointments already scheduled  "    Sep 12, 2017  7:45 AM CDT   Anticoagulation Visit with [facility]   [facility]    [address]  [facility] [id number] [phone]            Sep 28, 2017  8:45 AM CDT   Return Visit with Rolly Victoria MD   [facility] ([facility])    [address]  [address]  [facility] [address] [phone]            Oct 16, 2017  1:40 PM CDT   (Arrive by 1:25 PM)   NEW ARRHYTHMIA with Price Patel MD   [facility] ([facility])    [address] [place] [address]  [address] [facility] [id number] [phone]            Nov 01, 2017 11:30 AM CDT   Lab with  LAB   [facility] ([facility])    [address] [place] [address]  [address] [facility] [id number] [phone]            Nov 01, 2017 12:30 PM CDT   (Arrive by 12:00 PM)   New Patient Visit with Karen Proctor MD   [facility] ([facility])    [address] [place] [address]  [address] [facility] [id number] [phone]            Nov 30, 2017 10:30 AM CST   Lab with  LAB   [facility] ([facility])    [address] [place] [id number] [phone]            Nov 30, 2017 11:00 AM CST   (Arrive by 10:45 AM)   CORE RETURN with Emily Collado NP   [facility] ([facility])    [address] [place] [address]  [address] [facility] [id number] [phone]              Who to contact     If you have questions or need follow up information about today's clinic visit or your schedule please contact [facility] directly at [phone].  Normal or non-critical lab and imaging results will be communicated to you by MyChart, letter or phone within 4 business days after the clinic has received the results. If you do not hear from us  "within 7 days, please contact the clinic through ResQU or phone. If you have a critical or abnormal lab result, we will notify you by phone as soon as possible.  Submit refill requests through ResQU or call your pharmacy and they will forward the refill request to us. Please allow 3 business days for your refill to be completed.          Additional Information About Your Visit        ResQU Information     ResQU lets you send messages to your doctor, view your test results, renew your prescriptions, schedule appointments and more. To sign up, go to [email or web address] . Click on \"Log in\" on the left side of the screen, which will take you to the Welcome page. Then click on \"Sign up Now\" on the right side of the page.     You will be asked to enter the access code listed below, as well as some personal information. Please follow the directions to create your username and password.     Your access code is: [email or web address]  Expires: 10/1/2017  3:25 PM     Your access code will  in 90 days. If you need help or a new code, please call your [facility] clinic or [phone].        Care EveryWhere ID     This is your Care EveryWhere ID. This could be used by other organizations to access your [facility] medical records  [id number]        Your Vitals Were     Pulse Height Pulse Oximetry BMI (Body Mass Index)          103 1.575 m (5' 2\") 100% 22.46 kg/m2         Blood Pressure from Last 3 Encounters:   17 95/66   17 100/60   08/10/17 128/88    Weight from Last 3 Encounters:   17 55.7 kg (122 lb 12.8 oz)   17 54.3 kg (119 lb 11.2 oz)   08/10/17 54.4 kg (119 lb 14.4 oz)              Today, you had the following     No orders found for display         Today's Medication Changes          These changes are accurate as of: 17  9:58 AM.  If you have any questions, ask your nurse or doctor.               These medicines have changed or have updated prescriptions.        Dose/Directions    " metoprolol 25 MG 24 hr tablet   Commonly known as:  TOPROL-XL   This may have changed:  how much to take   Used for:  Mitral valve insufficiency, unspecified etiology   Changed by:  Emily Collado NP        Dose:  25 mg   Take 1 tablet (25 mg) by mouth daily   Quantity:  90 tablet   Refills:  3            Where to get your medicines      These medications were sent to [facility]/pharmacy #[id number] [place], [place] - [address] [facility]  [address] [place] [address]     Phone:  [phone]     metoprolol 25 MG 24 hr tablet                Primary Care Provider Office Phone # Fax #    Shanna Church [phone]       [address] [facility] [address]        Equal Access to Services     CUBA AYALA : Hadii aad ku hadasho Soomaali, waaxda luqadaha, qaybta kaalmada adeegyada, karen nettles . So [facility] [phone].    ATENCIÓN: Si habla español, tiene a suarez disposición servicios gratuitos de asistencia lingüística. [facility] [phone].    We comply with applicable federal civil rights laws and [place] laws. We do not discriminate on the basis of race, color, national origin, age, disability sex, sexual orientation or gender identity.            Thank you!     Thank you for choosing [facility]  for your care. Our goal is always to provide you with excellent care. Hearing back from our patients is one way we can continue to improve our services. Please take a few minutes to complete the written survey that you may receive in the mail after your visit with us. Thank you!             Your Updated Medication List - Protect others around you: Learn how to safely use, store and throw away your medicines at www.disposemymeds.org.          This list is accurate as of: 9/11/17  9:58 AM.  Always use your most recent med list.                   Brand Name Dispense Instructions for use Diagnosis    acetaminophen 500 MG tablet    TYLENOL    30 tablet    Take 2  tablets (1,000 mg) by mouth every 6 hours as needed for mild pain    Lower extremity pain, bilateral, Shakiness       alcohol swab prep pads     100 each    Use to swab area of injection/mary alice as directed 3 x per day        ascorbic acid 500 MG Tabs     2 tablet    Take 1 tablet (500 mg) by mouth daily    Coronary artery disease involving native coronary artery of native heart without angina pectoris       ASPIRIN NOT PRESCRIBED    INTENTIONAL    0 each    Please choose reason not prescribed, below    Cardiogenic shock (H), Type 2 diabetes mellitus without complication, with long-term current use of insulin (H)       atorvastatin 40 MG tablet    LIPITOR    60 tablet    1 tablet (40 mg) by Oral or Feeding Tube route daily    Coronary artery disease involving native coronary artery of native heart without angina pectoris, Cardiogenic shock (H)       baclofen 10 MG tablet    LIORESAL    90 tablet    Take 1 tablet (10 mg) by mouth 3 times daily    Lower limb symptom       beta carotene 97499 UNIT capsule     2 capsule    Take 1 capsule (25,000 Units) by mouth daily    Ischemic cardiomyopathy       blood glucose monitoring test strip    no brand specified    100 strip    Use to test blood sugar 3 times daily or as directed.    Diabetes mellitus type 2, insulin dependent (H)       bumetanide 0.5 MG tablet    BUMEX    120 tablet    Take 1 tablet (0.5 mg) by mouth daily    Ischemic cardiomyopathy       cetirizine 10 MG tablet    zyrTEC    60 tablet    Take 1 tablet (10 mg) by mouth daily    Seasonal allergic rhinitis       clopidogrel 75 MG tablet    PLAVIX    60 tablet    Take 1 tablet (75 mg) by mouth daily    ST elevation myocardial infarction (STEMI), unspecified artery (H)       digoxin 125 MCG tablet    LANOXIN    60 tablet    Take 1 tablet (125 mcg) by mouth daily    Coronary artery disease involving native coronary artery of native heart without angina pectoris, Cardiogenic shock (H)       finasteride 5 MG tablet     PROSCAR    60 tablet    Take 1 tablet (5 mg) by mouth daily    Hypertrophy of prostate with urinary obstruction       insulin glargine 100 UNIT/ML injection    LANTUS    18 mL    Inject 25 Units Subcutaneous every morning (before breakfast)        insulin pen needle 32G X 4 MM    BD KYLEE U/F    100 each    Use 3 daily or as directed.    Diabetes mellitus type 2, insulin dependent (H)       metoprolol 25 MG 24 hr tablet    TOPROL-XL    90 tablet    Take 1 tablet (25 mg) by mouth daily    Mitral valve insufficiency, unspecified etiology       pantoprazole 40 MG EC tablet    PROTONIX    60 tablet    Take 1 tablet (40 mg) by mouth daily    Gastrointestinal hemorrhage associated with other gastritis       potassium chloride SA 20 MEQ CR tablet    K-DUR/KLOR-CON M    120 tablet    Take 1 tablet (20 mEq) by mouth 2 times daily    Ischemic cardiomyopathy       Sharps Container Misc     1 each    1 each every 30 days    Diabetes mellitus type 2, insulin dependent (H)       tamsulosin 0.4 MG capsule    FLOMAX    60 capsule    Take 1 capsule (0.4 mg) by mouth daily    Ischemic cardiomyopathy       * Warfarin Therapy Reminder      1 each continuous prn    Deep vein thrombosis (DVT) of left lower extremity, unspecified chronicity, unspecified vein (H)       * warfarin 3 MG tablet    COUMADIN    100 tablet    Take one tablet daily as directed by the ACC    Deep vein thrombosis (DVT) of left lower extremity, unspecified chronicity, unspecified vein (H)       zinc sulfate 220 (50 ZN) MG capsule    ZINCATE    2 capsule    Take 1 capsule (220 mg) by mouth daily    Ischemic cardiomyopathy       * Notice:  This list has 2 medication(s) that are the same as other medications prescribed for you. Read the directions carefully, and ask your doctor or other care provider to review them with you.

## 2017-09-11 NOTE — PROGRESS NOTES
HPI  Mr. Henao is a 52 year old man with a history of ischemic cardiomyopathy s/p complex percutaneous revascularization (FEMI x7 to RCA, Lcx, and LAD in 3/2017) with refractory cardiogenic shock requiring IABP support and prolonged hospitalization, s/p MitraClip (5/1/17), renal insufficiency with several episodes of PEDRO in the setting of hypotension referred to CORE clinic by Dr. Cortez for uptitration of heart failure regimen.     Mr. Henao is comfortable walking on flat surfaces but does note fatigue and dyspnea with inclines. He sleeps with 1 pillow and denies PND. No chest pain, palpitations, occasional lightheadedness, no falls, or syncope. No ankle edema or abdominal bloating. Appetite is improving. No nausea.     Continues anticoagulation and denies any melena, hematuria, or epistaxis. No focal deficits.     Mr. Henao is unclear of his medications.     PMH  Past Medical History:   Diagnosis Date     History of thrombophlebitis      Social History     Social History     Marital status:      Spouse name: N/A     Number of children: N/A     Years of education: N/A     Occupational History     Not on file.     Social History Main Topics     Smoking status: Former Smoker     Packs/day: 0.50     Types: Cigarettes     Smokeless tobacco: Never Used     Alcohol use No     Drug use: No     Sexual activity: Not Currently     Partners: Female     Other Topics Concern     Not on file     Social History Narrative     No family history on file.    MEDS   Current Outpatient Prescriptions on File Prior to Visit:  bumetanide (BUMEX) 0.5 MG tablet Take 1 tablet (0.5 mg) by mouth daily   alcohol swab prep pads Use to swab area of injection/mary alice as directed 3 x per day   insulin glargine (LANTUS) 100 UNIT/ML injection Inject 25 Units Subcutaneous every morning (before breakfast)   atorvastatin (LIPITOR) 40 MG tablet 1 tablet (40 mg) by Oral or Feeding Tube route daily   cetirizine (ZYRTEC) 10 MG tablet Take 1 tablet (10 mg) by  "mouth daily   clopidogrel (PLAVIX) 75 MG tablet Take 1 tablet (75 mg) by mouth daily   digoxin (LANOXIN) 125 MCG tablet Take 1 tablet (125 mcg) by mouth daily   finasteride (PROSCAR) 5 MG tablet Take 1 tablet (5 mg) by mouth daily   pantoprazole (PROTONIX) 40 MG EC tablet Take 1 tablet (40 mg) by mouth daily   potassium chloride SA (K-DUR/KLOR-CON M) 20 MEQ CR tablet Take 1 tablet (20 mEq) by mouth 2 times daily   warfarin (COUMADIN) 3 MG tablet Take one tablet daily as directed by the ACC   baclofen (LIORESAL) 10 MG tablet Take 1 tablet (10 mg) by mouth 3 times daily   acetaminophen (TYLENOL) 500 MG tablet Take 2 tablets (1,000 mg) by mouth every 6 hours as needed for mild pain   metoprolol (TOPROL-XL) 25 MG 24 hr tablet Take 0.5 tablets (12.5 mg) by mouth daily   ASPIRIN NOT PRESCRIBED (INTENTIONAL) Please choose reason not prescribed, below   ascorbic acid 500 MG TABS Take 1 tablet (500 mg) by mouth daily   beta carotene 60462 UNIT capsule Take 1 capsule (25,000 Units) by mouth daily   tamsulosin (FLOMAX) 0.4 MG capsule Take 1 capsule (0.4 mg) by mouth daily   zinc sulfate (ZINCATE) 220 (50 ZN) MG capsule Take 1 capsule (220 mg) by mouth daily   blood glucose monitoring (NO BRAND SPECIFIED) test strip Use to test blood sugar 3 times daily or as directed.   Sharps Container MISC 1 each every 30 days   insulin pen needle (BD KYLEE U/F) 32G X 4 MM Use 3 daily or as directed.   Warfarin Therapy Reminder 1 each continuous prn     No current facility-administered medications on file prior to visit.     Physical examination:  BP 95/66 (BP Location: Left arm, Cuff Size: Adult Regular)  Pulse 103  Ht 1.575 m (5' 2\")  Wt 55.7 kg (122 lb 12.8 oz)  SpO2 100%  BMI 22.46 kg/m2  GENERAL APPEARANCE: healthy, alert and no distress  HEENT: no icterus, no xanthelasmas, normal pupil size and reaction, normal palate, mucosa moist, no cyanosis.  NECK: no adenopathy, no asymmetry, masses, or scars  CHEST: lungs clear to " auscultation - no rales, rhonchi or wheezes, no use of accessory muscles, no retractions, respirations are unlabored, normal respiratory rate, no kyphosis, no scoliosis  CARDIOVASCULAR: regular rhythm, normal S1 with physiologic split S2, no S3 or S4 and no murmur, click or rub, precordium quiet with normal PMI. JVP is flat  ABDOMEN: soft, non tender, without hepatomegaly, no masses palpable, bowel sounds normal  EXTREMITIES: no clubbing, cyanosis or edema  NEURO: alert and oriented to person/place/time, normal speech, gait and affect  SKIN: no ecchymoses, no rashes    LABS  Last Basic Metabolic Panel:  Lab Results   Component Value Date     2017      Lab Results   Component Value Date    POTASSIUM 4.1 2017     Lab Results   Component Value Date    CHLORIDE 103 2017     Lab Results   Component Value Date    ROB 9.1 2017     Lab Results   Component Value Date    CO2 27 2017     Lab Results   Component Value Date    BUN 25 2017     Lab Results   Component Value Date    CR 1.09 2017     Lab Results   Component Value Date     2017       ECHO  Recent Results (from the past 4320 hour(s))   ECHO COMPLETE WITH OPTISON    Narrative    [id number]  [facility]  [id number]  [id number]^MEGHAN^IVA^AMBER           [facility]  Diagnostic and Treamtent-3rd Floor  [address] [place] [address]     Name: SYDNIE SIERRA  MRN: [id number]  : 1965  Study Date: 08/10/2017 09:02 AM  Age: 52 yrs  Gender: Male  Patient [place] [facility]  Reason For Study: Nonrheumatic mitral (valve) insufficiency  Ordering Physician: IVA CHRISTIANSON  Referring Physician: IVA CHRISTIANSON  Performed By: True Newman RDCS     BSA: 1.5 m2  Height: 62 in  Weight: 118 lb  _____________________________________________________________________________  __        Procedure  Echocardiogram with two-dimensional, color and spectral Doppler  performed.  _____________________________________________________________________________  __        Interpretation Summary  Moderately (EF 30-35%) reduced left ventricular function is present (bi-plane  32%). Basal lateral, inferior, and inferoseptal hypokinesis.  Global RV function mildly reduced.  S/p trans-catheter MVR 6/2017. Mild mitral insufficiency is present. Mean  valve gradient 5 mmHg at a heart rate of 104 bpm.  Right ventricular systolic pressure is 40mmHg above the right atrial pressure.  The inferior vena cava is normal in size with preserved respiratory  variability.  No pericardial effusion is present.  _____________________________________________________________________________  __        Left Ventricle  Left ventricular wall thickness is normal. Left ventricular size is normal.  Diastolic function cannot be assessedBiplane traced at 32%. Moderately (EF 35-  40%) reduced left ventricular function is present. Basal lateral and inferior,  basal and mid inferoseptal, basal inferior hypokinesis.     Right Ventricle  The right ventricle is normal size. Global right ventricular function is  mildly reduced.     Atria  The right atria appears normal. Mild left atrial enlargement is present. The  atrial septum is intact as assessed by color Doppler .        Mitral Valve  S/p mitral valve repair. Mean gradient across the valve is 5 mmHg. Mild mitral  annular calcification is present. Mild mitral insufficiency is present. The  mean mitral valve gradient is 4.6 mmHg.     Aortic Valve  The aortic valve is tricuspid. Trace aortic insufficiency is present.     Tricuspid Valve  Mild tricuspid insufficiency is present. Right ventricular systolic pressure  is 40mmHg above the right atrial pressure.     Pulmonic Valve  The pulmonic valve is normal. Trace pulmonic insufficiency is present.     Vessels  The aorta root is normal. The inferior vena cava was normal in size with  preserved respiratory variability.  Estimated mean right atrial pressure is 3  mmHg.     Pericardium  No pericardial effusion is present.        Compared to Previous Study  This study was compared with the study from 2017 . The RVSP is marginally  higher.     Attestation  I have personally viewed the imaging and agree with the interpretation and  report as documented by the fellow, Dmitriy Merino, and/or edited by me.  _____________________________________________________________________________  __     MMode/2D Measurements & Calculations  IVSd: 0.73 cm  LVIDd: 5.3 cm  LVIDs: 4.5 cm  LVPWd: 0.46 cm  FS: 14.0 %  EDV(Teich): 134.6 ml  ESV(Teich): 94.8 ml  LV mass(C)d: 103.8 grams  LV mass(C)dI: 67.9 grams/m2  Ao root diam: 2.6 cm  asc Aorta Diam: 3.1 cm  LVOT diam: 1.8 cm  LVOT area: 2.6 cm2        Doppler Measurements & Calculations  MV max P.3 mmHg  MV mean P.6 mmHg  MV V2 VTI: 23.7 cm  TR max isabel: 317.6 cm/sec  TR max P.4 mmHg        _____________________________________________________________________________  __        Report approved by: Isabel Daniel 08/10/2017 03:02 PM          Assessment and Plan   Mr. Henao is a 52 year old man with a history of ischemic cardiomyopathy s/p Mitraclip and complicated revascularization, chronic renal insufficiency, who appears well. He is euvolemic and his renal function continues to improve. I considered adding an ACE-i, but his pulse is 103. We'll increase Toprol to 25 mg daily. Follow up Dr. Patel in 1 month as scheduled and CORE in 2 months.    20 minutes in direct care, >50% in counseling

## 2017-09-11 NOTE — LETTER
9/11/2017      RE: Luke Henao  [address]  [facility] [address]       Dear Colleague,    Thank you for the opportunity to participate in the care of your patient, Luke Henao, at the [facility]. Please see a copy of my visit note below.    HPI  Mr. Henao is a 52 year old man with a history of ischemic cardiomyopathy s/p complex percutaneous revascularization (FEMI x7 to RCA, Lcx, and LAD in 3/2017) with refractory cardiogenic shock requiring IABP support and prolonged hospitalization, s/p MitraClip (5/1/17), renal insufficiency with several episodes of PEDRO in the setting of hypotension referred to CORE clinic by Dr. Cortez for uptitration of heart failure regimen.     Mr. Henao is comfortable walking on flat surfaces but does note fatigue and dyspnea with inclines. He sleeps with 1 pillow and denies PND. No chest pain, palpitations, occasional lightheadedness, no falls, or syncope. No ankle edema or abdominal bloating. Appetite is improving. No nausea.     Continues anticoagulation and denies any melena, hematuria, or epistaxis. No focal deficits.     Mr. Henao is unclear of his medications.     PMH  Past Medical History:   Diagnosis Date     History of thrombophlebitis      Social History     Social History     Marital status:      Spouse name: N/A     Number of children: N/A     Years of education: N/A     Occupational History     Not on file.     Social History Main Topics     Smoking status: Former Smoker     Packs/day: 0.50     Types: Cigarettes     Smokeless tobacco: Never Used     Alcohol use No     Drug use: No     Sexual activity: Not Currently     Partners: Female     Other Topics Concern     Not on file     Social History Narrative     No family history on file.    MEDS   Current Outpatient Prescriptions on File Prior to Visit:  bumetanide (BUMEX) 0.5 MG tablet Take 1 tablet (0.5 mg) by mouth daily   alcohol swab prep pads Use to swab  "area of injection/mary alice as directed 3 x per day   insulin glargine (LANTUS) 100 UNIT/ML injection Inject 25 Units Subcutaneous every morning (before breakfast)   atorvastatin (LIPITOR) 40 MG tablet 1 tablet (40 mg) by Oral or Feeding Tube route daily   cetirizine (ZYRTEC) 10 MG tablet Take 1 tablet (10 mg) by mouth daily   clopidogrel (PLAVIX) 75 MG tablet Take 1 tablet (75 mg) by mouth daily   digoxin (LANOXIN) 125 MCG tablet Take 1 tablet (125 mcg) by mouth daily   finasteride (PROSCAR) 5 MG tablet Take 1 tablet (5 mg) by mouth daily   pantoprazole (PROTONIX) 40 MG EC tablet Take 1 tablet (40 mg) by mouth daily   potassium chloride SA (K-DUR/KLOR-CON M) 20 MEQ CR tablet Take 1 tablet (20 mEq) by mouth 2 times daily   warfarin (COUMADIN) 3 MG tablet Take one tablet daily as directed by the [facility]   baclofen (LIORESAL) 10 MG tablet Take 1 tablet (10 mg) by mouth 3 times daily   acetaminophen (TYLENOL) 500 MG tablet Take 2 tablets (1,000 mg) by mouth every 6 hours as needed for mild pain   metoprolol (TOPROL-XL) 25 MG 24 hr tablet Take 0.5 tablets (12.5 mg) by mouth daily   ASPIRIN NOT PRESCRIBED (INTENTIONAL) Please choose reason not prescribed, below   ascorbic acid 500 MG TABS Take 1 tablet (500 mg) by mouth daily   beta carotene 27737 UNIT capsule Take 1 capsule (25,000 Units) by mouth daily   tamsulosin (FLOMAX) 0.4 MG capsule Take 1 capsule (0.4 mg) by mouth daily   zinc sulfate (ZINCATE) 220 (50 ZN) MG capsule Take 1 capsule (220 mg) by mouth daily   blood glucose monitoring (NO BRAND SPECIFIED) test strip Use to test blood sugar 3 times daily or as directed.   Sharps Container MISC 1 each every 30 days   insulin pen needle (BD KYLEE U/F) 32G X 4 MM Use 3 daily or as directed.   Warfarin Therapy Reminder 1 each continuous prn     No current facility-administered medications on file prior to visit.     Physical examination:  BP 95/66 (BP Location: Left arm, Cuff Size: Adult Regular)  Pulse 103  Ht 1.575 m (5' 2\") "  Wt 55.7 kg (122 lb 12.8 oz)  SpO2 100%  BMI 22.46 kg/m2  GENERAL APPEARANCE: healthy, alert and no distress  HEENT: no icterus, no xanthelasmas, normal pupil size and reaction, normal palate, mucosa moist, no cyanosis.  NECK: no adenopathy, no asymmetry, masses, or scars  CHEST: lungs clear to auscultation - no rales, rhonchi or wheezes, no use of accessory muscles, no retractions, respirations are unlabored, normal respiratory rate, no kyphosis, no scoliosis  CARDIOVASCULAR: regular rhythm, normal S1 with physiologic split S2, no S3 or S4 and no murmur, click or rub, precordium quiet with normal PMI. JVP is flat  ABDOMEN: soft, non tender, without hepatomegaly, no masses palpable, bowel sounds normal  EXTREMITIES: no clubbing, cyanosis or edema  NEURO: alert and oriented to person/place/time, normal speech, gait and affect  SKIN: no ecchymoses, no rashes    LABS  Last Basic Metabolic Panel:  Lab Results   Component Value Date     2017      Lab Results   Component Value Date    POTASSIUM 4.1 2017     Lab Results   Component Value Date    CHLORIDE 103 2017     Lab Results   Component Value Date    ROB 9.1 2017     Lab Results   Component Value Date    CO2 27 2017     Lab Results   Component Value Date    BUN 25 2017     Lab Results   Component Value Date    CR 1.09 2017     Lab Results   Component Value Date     2017       ECHO  Recent Results (from the past 4320 hour(s))   ECHO COMPLETE WITH OPTISON    Narrative    [id number]  [id number]  [id number]  [id number]^MEGHAN^IVA^AMBER           [facility]  Diagnostic and Treamtent-3rd Floor  [address] [place] [address]     Name: SYDNIE SIERRA  MRN: [id number]  : 1965  Study Date: 08/10/2017 09:02 AM  Age: 52 yrs  Gender: Male  [place] [facility] [place]  Reason For Study: Nonrheumatic mitral (valve) insufficiency  Ordering Physician: MEGHAN  IVA  Referring Physician: IVA CHRISTIANSON  Performed By: True Newman DARRIAN     BSA: 1.5 m2  Height: 62 in  Weight: 118 lb  _____________________________________________________________________________  __        Procedure  Echocardiogram with two-dimensional, color and spectral Doppler performed.  _____________________________________________________________________________  __        Interpretation Summary  Moderately (EF 30-35%) reduced left ventricular function is present (bi-plane  32%). Basal lateral, inferior, and inferoseptal hypokinesis.  Global RV function mildly reduced.  S/p trans-catheter MVR 6/2017. Mild mitral insufficiency is present. Mean  valve gradient 5 mmHg at a heart rate of 104 bpm.  Right ventricular systolic pressure is 40mmHg above the right atrial pressure.  The inferior vena cava is normal in size with preserved respiratory  variability.  No pericardial effusion is present.  _____________________________________________________________________________  __        Left Ventricle  Left ventricular wall thickness is normal. Left ventricular size is normal.  Diastolic function cannot be assessedBiplane traced at 32%. Moderately (EF 35-  40%) reduced left ventricular function is present. Basal lateral and inferior,  basal and mid inferoseptal, basal inferior hypokinesis.     Right Ventricle  The right ventricle is normal size. Global right ventricular function is  mildly reduced.     Atria  The right atria appears normal. Mild left atrial enlargement is present. The  atrial septum is intact as assessed by color Doppler .        Mitral Valve  S/p mitral valve repair. Mean gradient across the valve is 5 mmHg. Mild mitral  annular calcification is present. Mild mitral insufficiency is present. The  mean mitral valve gradient is 4.6 mmHg.     Aortic Valve  The aortic valve is tricuspid. Trace aortic insufficiency is present.     Tricuspid Valve  Mild tricuspid insufficiency is present.  Right ventricular systolic pressure  is 40mmHg above the right atrial pressure.     Pulmonic Valve  The pulmonic valve is normal. Trace pulmonic insufficiency is present.     Vessels  The aorta root is normal. The inferior vena cava was normal in size with  preserved respiratory variability. Estimated mean right atrial pressure is 3  mmHg.     Pericardium  No pericardial effusion is present.        Compared to Previous Study  This study was compared with the study from 2017 . The RVSP is marginally  higher.     Attestation  I have personally viewed the imaging and agree with the interpretation and  report as documented by the fellow, Dmitriy Merino, and/or edited by me.  _____________________________________________________________________________  __     MMode/2D Measurements & Calculations  IVSd: 0.73 cm  LVIDd: 5.3 cm  LVIDs: 4.5 cm  LVPWd: 0.46 cm  FS: 14.0 %  EDV(Teich): 134.6 ml  ESV(Teich): 94.8 ml  LV mass(C)d: 103.8 grams  LV mass(C)dI: 67.9 grams/m2  Ao root diam: 2.6 cm  asc Aorta Diam: 3.1 cm  LVOT diam: 1.8 cm  LVOT area: 2.6 cm2        Doppler Measurements & Calculations  MV max P.3 mmHg  MV mean P.6 mmHg  MV V2 VTI: 23.7 cm  TR max isabel: 317.6 cm/sec  TR max P.4 mmHg        _____________________________________________________________________________  __        Report approved by: Isabel Daniel 08/10/2017 03:02 PM          Assessment and Plan   Mr. Henao is a 52 year old man with a history of ischemic cardiomyopathy s/p Mitraclip and complicated revascularization, chronic renal insufficiency, who appears well. He is euvolemic and his renal function continues to improve. I considered adding an ACE-i, but his pulse is 103. We'll increase Toprol to 25 mg daily. Follow up Dr. Patel in 1 month as scheduled and CORE in 2 months.    20 minutes in direct care, >50% in counseling        Please do not hesitate to contact me if you have any questions/concerns.     Sincerely,     Emily Londono  [place], NP

## 2017-09-11 NOTE — NURSING NOTE
Chief Complaint   Patient presents with     New Patient     New CORE: 53yo Male w/ ICM with reduced EF 32%. S/P mitralclip. Cortez patient. labs prior     Vitals were taken and medications were reconciled.     Darren Eaton MA  8:50 AM

## 2017-09-11 NOTE — PATIENT INSTRUCTIONS
You were seen today in the Cardiovascular Clinic at the [facility].     Cardiology Providers you saw during your visit: Emily Collado NP     1. Please increase Toprol to 25 mg (a full tablet) once daily    2. Please bring your prescription bottles with to your next visit    3. Please return to see Dr. Patel in October and return to CORE clinic in November    Results for SYDNIE SIERRA (MRN [id number]) as of 9/11/2017 09:07   Ref. Range 9/11/2017 08:35   Sodium Latest Ref Range: 133 - 144 mmol/L 139   Potassium Latest Ref Range: 3.4 - 5.3 mmol/L 4.1   Chloride Latest Ref Range: 94 - 109 mmol/L 103   Carbon Dioxide Latest Ref Range: 20 - 32 mmol/L 27   Urea Nitrogen Latest Ref Range: 7 - 30 mg/dL 25   Creatinine Latest Ref Range: 0.66 - 1.25 mg/dL 1.09   GFR Estimate Latest Ref Range: >60 mL/min/1.7m2 72   GFR Estimate If Black Latest Ref Range: >60 mL/min/1.7m2 87   Calcium Latest Ref Range: 8.5 - 10.1 mg/dL 9.1   Anion Gap Latest Ref Range: 3 - 14 mmol/L 8   N-Terminal Pro Bnp Latest Ref Range: 0 - 125 pg/mL 1599 (H)   Glucose Latest Ref Range: 70 - 99 mg/dL 148 (H)   WBC Latest Ref Range: 4.0 - 11.0 10e9/L 8.6   Hemoglobin Latest Ref Range: 13.3 - 17.7 g/dL 11.0 (L)   Hematocrit Latest Ref Range: 40.0 - 53.0 % 35.3 (L)   Platelet Count Latest Ref Range: 150 - 450 10e9/L 275   RBC Count Latest Ref Range: 4.4 - 5.9 10e12/L 3.74 (L)   MCV Latest Ref Range: 78 - 100 fl 94   MCH Latest Ref Range: 26.5 - 33.0 pg 29.4   MCHC Latest Ref Range: 31.5 - 36.5 g/dL 31.2 (L)   RDW Latest Ref Range: 10.0 - 15.0 % 13.9       Please limit your fluid intake to 2 L (64 ounces) daily.  2 Liters a day = 8.5 cups, or 72 ounces.  Please limit your salt intake to 2 grams a day or less.     If you gain 2# in 24 hours or 5# in one week call Herminia Jiménez RN so we can adjust your medications as needed over the phone.     Please feel free to call me with any questions or concerns.       Herminia Jiménez RN BSN  "CHFN  [facility]  Cardiology Care Coordinator-Heart Failure Clinic     Questions and [phone].   First press #1 for the University and then press #3 for \"Medical Questions\" to reach us Cardiology Nurses.      On Call Cardiologist for after hours or on weekends: [phone]   option #4 and ask to speak to the on-call Cardiologist. Inform them you are a CORE/heart failure patient at the [facility].           If you need a medication refill please contact your pharmacy.  Please allow 3 business days for your refill to be completed.  _______________________________________________________  C.O.R.E. CLINIC Cardiomyopathy, Optimization, Rehabilitation, Education   The C.O.R.E. CLINIC is a heart failure specialty clinic within the [facility] where you will work with specialized nurse practitioners dedicated to helping patients with heart failure carefully adjust medications, receive education, and learn who and when to call if symptoms develop. They specialize in helping you better understand your condition, slow the progression of your disease, improve the length and quality of your life, help you detect future heart problems before they become life threatening, and avoid hospitalizations.  As always, thank you for trusting us with your health care needs!   "

## 2017-09-12 ENCOUNTER — ANTICOAGULATION THERAPY VISIT (OUTPATIENT)
Dept: ANTICOAGULATION | Facility: CLINIC | Age: 52
End: 2017-09-12
Payer: COMMERCIAL

## 2017-09-12 DIAGNOSIS — Z79.4 DIABETES MELLITUS TYPE 2, INSULIN DEPENDENT (H): ICD-10-CM

## 2017-09-12 DIAGNOSIS — E11.9 DIABETES MELLITUS TYPE 2, INSULIN DEPENDENT (H): ICD-10-CM

## 2017-09-12 LAB — INR POINT OF CARE: 3.6 (ref 0.86–1.14)

## 2017-09-12 PROCEDURE — 85610 PROTHROMBIN TIME: CPT | Mod: QW

## 2017-09-12 PROCEDURE — 36416 COLLJ CAPILLARY BLOOD SPEC: CPT

## 2017-09-12 NOTE — MR AVS SNAPSHOT
Luke WERNER Henao   9/12/2017 7:45 AM   Anticoagulation Therapy Visit    Description:  52 year old male   Provider:  [facility] [employer];  ANTICOAGULATION CLINIC   Department:   Anti Coagulation           INR as of 9/12/2017     Today's INR 3.6!      Anticoagulation Summary as of 9/12/2017     INR goal 2.0-3.0   Today's INR 3.6!   Full instructions 9/12: Hold; Otherwise 1.5 mg on Mon, Fri; 3 mg all other days   Next INR check 10/3/2017    Indications   Stroke (H) [I63.9]         Your next Anticoagulation Clinic appointment(s)     Oct 03, 2017  1:15 PM CDT   Anticoagulation Visit with  ANTICOAGULATION CLINIC   [facility]    [address] [address] [phone]              Contact Numbers     [facility]  Please call  [phone] to cancel and/or reschedule your appointment   Please call  [phone] with any problems or questions regarding your therapy.        September 2017 Details    Sun Mon Tue Wed Thu Fri Sat          1               2                 3               4               5               6               7               8               9                 10               11               12      Hold   See details      13      3 mg         14      3 mg         15      1.5 mg         16      3 mg           17      3 mg         18      1.5 mg         19      3 mg         20      3 mg         21      3 mg         22      1.5 mg         23      3 mg           24      3 mg         25      1.5 mg         26      3 mg         27      3 mg         28      3 mg         29      1.5 mg         30      3 mg          Date Details   09/12 This INR check               How to take your warfarin dose     To take:  1.5 mg Take 0.5 of a 3 mg tablet.    To take:  3 mg Take 1 of the 3 mg tablets.    Hold Do not take your warfarin dose. See the Details table to the right for additional instructions.                 October 2017 Details    Sun Mon Tue Wed Thu Fri Sat     1      3 mg         2      1.5 mg         3            4               5               6               7                 8               9               10               11               12               13               14                 15               16               17               18               19               20               21                 22               23               24               25               26               27               28                 29               30               31                    Date Details   No additional details    Date of next INR:  10/3/2017         How to take your warfarin dose     To take:  1.5 mg Take 0.5 of a 3 mg tablet.    To take:  3 mg Take 1 of the 3 mg tablets.

## 2017-09-12 NOTE — PROGRESS NOTES
ANTICOAGULATION FOLLOW-UP CLINIC VISIT    Patient Name:  Luke Henao  Date:  9/12/2017  Contact Type:  Face to Face    SUBJECTIVE:     Patient Findings     Positives Change in diet/appetite (Poor apppetite until past few days.), Inflammation (Pt has been having pain in thighs which is unchanged from hospital.)           OBJECTIVE    INR Protime   Date Value Ref Range Status   09/12/2017 3.6 (A) 0.86 - 1.14 Final       ASSESSMENT / PLAN  INR assessment SUPRA    Recheck INR In: 3 WEEKS    INR Location Clinic      Anticoagulation Summary as of 9/12/2017     INR goal 2.0-3.0   Today's INR 3.6!   Maintenance plan 1.5 mg (3 mg x 0.5) on Mon, Fri; 3 mg (3 mg x 1) all other days   Full instructions 9/12: Hold; Otherwise 1.5 mg on Mon, Fri; 3 mg all other days   Weekly total 18 mg   Plan last modified Velvet Jones, RN (9/12/2017)   Next INR check 10/3/2017   Target end date     Indications   Stroke (H) [I63.9]         Anticoagulation Episode Summary     INR check location     Preferred lab     Send INR reminders to  [facility]    Comments             See the Encounter Report to view Anticoagulation Flowsheet and Dosing Calendar (Go to Encounters tab in chart review, and find the Anticoagulation Therapy Visit)        Velvet Jones, RN

## 2017-09-18 DIAGNOSIS — I21.3 ST ELEVATION MYOCARDIAL INFARCTION (STEMI), UNSPECIFIED ARTERY (H): ICD-10-CM

## 2017-09-18 DIAGNOSIS — I25.10 CORONARY ARTERY DISEASE INVOLVING NATIVE CORONARY ARTERY OF NATIVE HEART WITHOUT ANGINA PECTORIS: ICD-10-CM

## 2017-09-18 DIAGNOSIS — N40.1 HYPERTROPHY OF PROSTATE WITH URINARY OBSTRUCTION: ICD-10-CM

## 2017-09-18 DIAGNOSIS — E11.9 DIABETES MELLITUS TYPE 2, INSULIN DEPENDENT (H): ICD-10-CM

## 2017-09-18 DIAGNOSIS — K29.61 GASTROINTESTINAL HEMORRHAGE ASSOCIATED WITH OTHER GASTRITIS: ICD-10-CM

## 2017-09-18 DIAGNOSIS — Z79.4 DIABETES MELLITUS TYPE 2, INSULIN DEPENDENT (H): ICD-10-CM

## 2017-09-18 DIAGNOSIS — R57.0 CARDIOGENIC SHOCK (H): ICD-10-CM

## 2017-09-18 DIAGNOSIS — N13.8 HYPERTROPHY OF PROSTATE WITH URINARY OBSTRUCTION: ICD-10-CM

## 2017-09-18 DIAGNOSIS — I25.5 ISCHEMIC CARDIOMYOPATHY: ICD-10-CM

## 2017-09-18 NOTE — TELEPHONE ENCOUNTER
digoxin (LANOXIN) 125 MCG tablet      Last Written Prescription Date: 7/20/2017  Last Fill Quantity: 60, # refills: 0  Last Office Visit with [facility], [facility] or  Health prescribing provider:  8/17/2017   Next 5 appointments (look out 90 days)     Sep 26, 2017  9:00 AM CDT   PHYSICAL with Shanna Church MD   [facility]    [address] [place] [id number] [phone]                   Creatinine   Date Value Ref Range Status   09/11/2017 1.09 0.66 - 1.25 mg/dL Final   ]    Digoxin Level:    Lab Results   Component Value Date    DIGOXIN 0.5 05/07/2017        blood glucose monitoring (NO BRAND SPECIFIED) test strip      Last Written Prescription Date: 5/25/2017  Last Fill Quantity: 100,  # refills: 6   Last Office Visit with [facility], [facility] or [facility] prescribing provider: 8/17/2017                                         Next 5 appointments (look out 90 days)     Sep 26, 2017  9:00 AM CDT   PHYSICAL with Shanna Church MD   [facility] ([facility]    [address] [place] [id number] [phone]

## 2017-09-19 RX ORDER — DIGOXIN 125 MCG
125 TABLET ORAL DAILY
Qty: 60 TABLET | Refills: 5 | Status: SHIPPED | OUTPATIENT
Start: 2017-09-19 | End: 2018-01-12

## 2017-09-26 ENCOUNTER — OFFICE VISIT (OUTPATIENT)
Dept: INTERNAL MEDICINE | Facility: CLINIC | Age: 52
End: 2017-09-26
Payer: COMMERCIAL

## 2017-09-26 VITALS
DIASTOLIC BLOOD PRESSURE: 84 MMHG | WEIGHT: 122.6 LBS | HEIGHT: 62 IN | HEART RATE: 93 BPM | BODY MASS INDEX: 22.56 KG/M2 | TEMPERATURE: 98.5 F | SYSTOLIC BLOOD PRESSURE: 114 MMHG | OXYGEN SATURATION: 100 %

## 2017-09-26 DIAGNOSIS — Z00.00 ROUTINE HISTORY AND PHYSICAL EXAMINATION OF ADULT: Primary | ICD-10-CM

## 2017-09-26 DIAGNOSIS — D64.9 ANEMIA, UNSPECIFIED: ICD-10-CM

## 2017-09-26 DIAGNOSIS — Z23 NEED FOR VACCINATION: ICD-10-CM

## 2017-09-26 DIAGNOSIS — E11.9 TYPE 2 DIABETES MELLITUS WITHOUT COMPLICATION, WITH LONG-TERM CURRENT USE OF INSULIN (H): ICD-10-CM

## 2017-09-26 DIAGNOSIS — Z79.4 TYPE 2 DIABETES MELLITUS WITHOUT COMPLICATION, WITH LONG-TERM CURRENT USE OF INSULIN (H): ICD-10-CM

## 2017-09-26 PROBLEM — I82.409 DVT (DEEP VENOUS THROMBOSIS) (H): Status: ACTIVE | Noted: 2017-01-01

## 2017-09-26 PROBLEM — I21.3 STEMI (ST ELEVATION MYOCARDIAL INFARCTION) (H): Status: RESOLVED | Noted: 2017-03-26 | Resolved: 2017-09-26

## 2017-09-26 PROBLEM — I69.919 COGNITIVE DEFICITS AS LATE EFFECT OF CEREBROVASCULAR DISEASE: Status: RESOLVED | Noted: 2017-05-25 | Resolved: 2017-09-26

## 2017-09-26 PROBLEM — I50.22 CHRONIC SYSTOLIC HEART FAILURE (H): Status: RESOLVED | Noted: 2017-08-30 | Resolved: 2017-09-26

## 2017-09-26 PROBLEM — R57.0 CARDIOGENIC SHOCK (H): Status: RESOLVED | Noted: 2017-03-27 | Resolved: 2017-09-26

## 2017-09-26 PROBLEM — I25.10 CAD (CORONARY ARTERY DISEASE): Status: ACTIVE | Noted: 2017-01-01

## 2017-09-26 PROBLEM — Z71.89 ADVANCED DIRECTIVES, COUNSELING/DISCUSSION: Chronic | Status: RESOLVED | Noted: 2017-07-28 | Resolved: 2017-09-26

## 2017-09-26 PROBLEM — I34.0 MITRAL REGURGITATION: Status: RESOLVED | Noted: 2017-05-01 | Resolved: 2017-09-26

## 2017-09-26 PROBLEM — R41.89 IMPAIRED COGNITION: Status: RESOLVED | Noted: 2017-05-25 | Resolved: 2017-09-26

## 2017-09-26 PROBLEM — E78.5 HYPERLIPIDEMIA LDL GOAL <70: Status: RESOLVED | Noted: 2017-06-06 | Resolved: 2017-09-26

## 2017-09-26 PROBLEM — I21.9 ACUTE MI (H): Status: RESOLVED | Noted: 2017-03-26 | Resolved: 2017-09-26

## 2017-09-26 PROBLEM — I63.9 STROKE (H): Status: RESOLVED | Noted: 2017-05-12 | Resolved: 2017-09-26

## 2017-09-26 PROBLEM — I25.5 ISCHEMIC CARDIOMYOPATHY: Status: ACTIVE | Noted: 2017-01-01

## 2017-09-26 PROBLEM — I63.30 CEREBROVASCULAR ACCIDENT (CVA) DUE TO THROMBOSIS OF CEREBRAL ARTERY (H): Status: RESOLVED | Noted: 2017-06-06 | Resolved: 2017-09-26

## 2017-09-26 LAB
ALBUMIN SERPL-MCNC: 4.1 G/DL (ref 3.4–5)
ALP SERPL-CCNC: 63 U/L (ref 40–150)
ALT SERPL W P-5'-P-CCNC: 19 U/L (ref 0–70)
ANION GAP SERPL CALCULATED.3IONS-SCNC: 6 MMOL/L (ref 3–14)
AST SERPL W P-5'-P-CCNC: 15 U/L (ref 0–45)
BILIRUB SERPL-MCNC: 0.6 MG/DL (ref 0.2–1.3)
BUN SERPL-MCNC: 23 MG/DL (ref 7–30)
CALCIUM SERPL-MCNC: 9.2 MG/DL (ref 8.5–10.1)
CHLORIDE SERPL-SCNC: 106 MMOL/L (ref 94–109)
CO2 SERPL-SCNC: 28 MMOL/L (ref 20–32)
CREAT SERPL-MCNC: 1.16 MG/DL (ref 0.66–1.25)
ERYTHROCYTE [DISTWIDTH] IN BLOOD BY AUTOMATED COUNT: 13.8 % (ref 10–15)
FERRITIN SERPL-MCNC: 172 NG/ML (ref 26–388)
FOLATE SERPL-MCNC: 18.7 NG/ML
GFR SERPL CREATININE-BSD FRML MDRD: 67 ML/MIN/1.7M2
GLUCOSE SERPL-MCNC: 118 MG/DL (ref 70–99)
HBA1C MFR BLD: 6 % (ref 4.3–6)
HCT VFR BLD AUTO: 36 % (ref 40–53)
HGB BLD-MCNC: 11.1 G/DL (ref 13.3–17.7)
IRON SATN MFR SERPL: 16 % (ref 15–46)
IRON SERPL-MCNC: 63 UG/DL (ref 35–180)
MCH RBC QN AUTO: 30 PG (ref 26.5–33)
MCHC RBC AUTO-ENTMCNC: 30.8 G/DL (ref 31.5–36.5)
MCV RBC AUTO: 97 FL (ref 78–100)
PLATELET # BLD AUTO: 274 10E9/L (ref 150–450)
POTASSIUM SERPL-SCNC: 3.7 MMOL/L (ref 3.4–5.3)
PROT SERPL-MCNC: 8.4 G/DL (ref 6.8–8.8)
RBC # BLD AUTO: 3.7 10E12/L (ref 4.4–5.9)
SODIUM SERPL-SCNC: 140 MMOL/L (ref 133–144)
TIBC SERPL-MCNC: 382 UG/DL (ref 240–430)
VIT B12 SERPL-MCNC: 541 PG/ML (ref 193–986)
WBC # BLD AUTO: 9.1 10E9/L (ref 4–11)

## 2017-09-26 PROCEDURE — 82728 ASSAY OF FERRITIN: CPT | Performed by: INTERNAL MEDICINE

## 2017-09-26 PROCEDURE — 85027 COMPLETE CBC AUTOMATED: CPT | Performed by: INTERNAL MEDICINE

## 2017-09-26 PROCEDURE — 90471 IMMUNIZATION ADMIN: CPT | Performed by: INTERNAL MEDICINE

## 2017-09-26 PROCEDURE — 90715 TDAP VACCINE 7 YRS/> IM: CPT | Performed by: INTERNAL MEDICINE

## 2017-09-26 PROCEDURE — 36415 COLL VENOUS BLD VENIPUNCTURE: CPT | Performed by: INTERNAL MEDICINE

## 2017-09-26 PROCEDURE — 82607 VITAMIN B-12: CPT | Performed by: INTERNAL MEDICINE

## 2017-09-26 PROCEDURE — 82746 ASSAY OF FOLIC ACID SERUM: CPT | Performed by: INTERNAL MEDICINE

## 2017-09-26 PROCEDURE — 83550 IRON BINDING TEST: CPT | Performed by: INTERNAL MEDICINE

## 2017-09-26 PROCEDURE — 83540 ASSAY OF IRON: CPT | Performed by: INTERNAL MEDICINE

## 2017-09-26 PROCEDURE — 83036 HEMOGLOBIN GLYCOSYLATED A1C: CPT | Performed by: INTERNAL MEDICINE

## 2017-09-26 PROCEDURE — 99396 PREV VISIT EST AGE 40-64: CPT | Mod: 25 | Performed by: INTERNAL MEDICINE

## 2017-09-26 PROCEDURE — 80053 COMPREHEN METABOLIC PANEL: CPT | Performed by: INTERNAL MEDICINE

## 2017-09-26 NOTE — MR AVS SNAPSHOT
After Visit Summary   9/26/2017    Luke Henao    MRN: [id number]           Patient Information     Date Of Birth          1965        Visit Information        Provider Department      9/26/2017 8:45 AM Shanna Church MD; [employer] [facility]        Today's Diagnoses     Polyp of colon, unspecified part of colon, unspecified type    -  1    Anemia, unspecified        Type 2 diabetes mellitus without complication, with long-term current use of insulin (H)        Need for vaccination          Care Instructions    Tetanus booster today.    ---    Labs downstairs.     ---    You will be contacted to schedule the colonoscopy.    ---    Please schedule appointment with eye doctor - number below.           Follow-ups after your visit        Additional Services     GASTROENTEROLOGY ADULT REF PROCEDURE ONLY       Last Lab Result: Creatinine (mg/dL)       Date                     Value                 09/11/2017               1.09             ----------  Body mass index is 22.42 kg/(m^2).      Patient will be contacted to schedule procedure.     Please be aware that coverage of these services is subject to the terms and limitations of your health insurance plan.  Call member services at your health plan with any benefit or coverage questions.  Any procedures must be performed at a [facility] facility OR coordinated by your clinic's referral office.    Please bring the following with you to your appointment:    (1) Any X-Rays, CTs or MRIs which have been performed.  Contact the facility where they were done to arrange for  prior to your scheduled appointment.    (2) List of current medications   (3) This referral request   (4) Any documents/labs given to you for this referral            OPHTHALMOLOGY ADULT REFERRAL       Your provider has referred you to: [facility]: [facility] [place] [phone]   [email or web address]    Please be aware that coverage of these services is subject to the terms and limitations of your health insurance plan.  Call member services at your health plan with any benefit or coverage questions.      Please bring the following with you to your appointment:    (1) Any X-Rays, CTs or MRIs which have been performed.  Contact the facility where they were done to arrange for  prior to your scheduled appointment.    (2) List of current medications  (3) This referral request   (4) Any documents/labs given to you for this referral                  Your next 10 appointments already scheduled     Oct 03, 2017  1:15 PM CDT   Anticoagulation Visit with  [facility] CLINIC   [facility]    [address] [id number] [phone]            Oct 16, 2017  1:40 PM CDT   (Arrive by 1:25 PM)   NEW ARRHYTHMIA with Price Patel MD   [facility] ([facility])    [address] [place] [address] [id number] [phone]            Nov 30, 2017 10:30 AM CST   Lab with  [facility]    [facility]    [address] [place] [address] [id number] [phone]            Nov 30, 2017 11:00 AM CST   (Arrive by 10:45 AM)   CORE RETURN with Emily Collado NP   [facility] ([facility])    [address] [place] [id number] [phone]              Who to contact     If you have questions or need follow up information about today's clinic visit or your schedule please contact [facility] directly at [phone].  Normal or non-critical lab and imaging results will be communicated to you by MyChart, letter or phone within 4 business days after the clinic has received the results. If you do not hear from us within 7 days, please  "contact the clinic through [employer] or phone. If you have a critical or abnormal lab result, we will notify you by phone as soon as possible.  Submit refill requests through MyWobile or call your pharmacy and they will forward the refill request to us. Please allow 3 business days for your refill to be completed.          Additional Information About Your Visit        ObsEvaharCogo Information     My[employer] lets you send messages to your doctor, view your test results, renew your prescriptions, schedule appointments and more. To sign up, go to [email or web address] . Click on \"Log in\" on the left side of the screen, which will take you to the Welcome page. Then click on \"Sign up Now\" on the right side of the page.     You will be asked to enter the access code listed below, as well as some personal information. Please follow the directions to create your username and password.     Your access code is: [email or web address]  Expires: 10/1/2017  3:25 PM     Your access code will  in 90 days. If you need help or a new code, please call your [place] clinic or [phone].        Care EveryWhere ID     This is your Care EveryWhere ID. This could be used by other organizations to access your [place] medical records  [id number]        Your Vitals Were     Pulse Temperature Height Pulse Oximetry BMI (Body Mass Index)       93 98.5  F (36.9  C) (Oral) 5' 2\" (1.575 m) 100% 22.42 kg/m2        Blood Pressure from Last 3 Encounters:   17 114/84   17 95/66   17 100/60    Weight from Last 3 Encounters:   17 122 lb 9.6 oz (55.6 kg)   17 122 lb 12.8 oz (55.7 kg)   17 119 lb 11.2 oz (54.3 kg)              We Performed the Following     1st  Administration  [52552]     CBC with platelets     Comprehensive metabolic panel     Ferritin     Folate     GASTROENTEROLOGY ADULT REF PROCEDURE ONLY     Hemoglobin A1c     Iron and iron binding capacity     OPHTHALMOLOGY ADULT REFERRAL     TDAP VACCINE (ADACEL) " [79199.002]     Vitamin B12        Primary Care Provider Office Phone # Fax #    Shanna Church [phone]       [address] [facility] [address]        Equal Access to Services     CUBA AYALA : Hadii aad ku hadchiloo Soomaali, waaxda luqadaha, qaybta kaalmada adeegyada, karen stewartn shabbir cordova lahersonjoann rosen. So [facility] [phone].    ATENCIÓN: Si habla español, tiene a suarez disposición servicios gratuitos de asistencia lingüística. Llame al [phone].    We comply with applicable federal civil rights laws and [place] laws. We do not discriminate on the basis of race, color, national origin, age, disability sex, sexual orientation or gender identity.            Thank you!     Thank you for choosing [facility]  for your care. Our goal is always to provide you with excellent care. Hearing back from our patients is one way we can continue to improve our services. Please take a few minutes to complete the written survey that you may receive in the mail after your visit with us. Thank you!             Your Updated Medication List - Protect others around you: Learn how to safely use, store and throw away your medicines at www.disposemymeds.org.          This list is accurate as of: 9/26/17  9:32 AM.  Always use your most recent med list.                   Brand Name Dispense Instructions for use Diagnosis    acetaminophen 500 MG tablet    TYLENOL    30 tablet    Take 2 tablets (1,000 mg) by mouth every 6 hours as needed for mild pain    Lower extremity pain, bilateral, Shakiness       alcohol swab prep pads     100 each    Use to swab area of injection/mary alice as directed 3 x per day        ascorbic acid 500 MG Tabs     2 tablet    Take 1 tablet (500 mg) by mouth daily    Coronary artery disease involving native coronary artery of native heart without angina pectoris       ASPIRIN NOT PRESCRIBED    INTENTIONAL    0 each    Please choose reason not prescribed, below     Cardiogenic shock (H), Type 2 diabetes mellitus without complication, with long-term current use of insulin (H)       atorvastatin 40 MG tablet    LIPITOR    60 tablet    1 tablet (40 mg) by Oral or Feeding Tube route daily    Coronary artery disease involving native coronary artery of native heart without angina pectoris, Cardiogenic shock (H)       baclofen 10 MG tablet    LIORESAL    90 tablet    Take 1 tablet (10 mg) by mouth 3 times daily    Lower limb symptom       beta carotene 26552 UNIT capsule     2 capsule    Take 1 capsule (25,000 Units) by mouth daily    Ischemic cardiomyopathy       blood glucose monitoring test strip    no brand specified    100 strip    Use to test blood sugar 3 times daily or as directed.    Diabetes mellitus type 2, insulin dependent (H)       bumetanide 0.5 MG tablet    BUMEX    120 tablet    Take 1 tablet (0.5 mg) by mouth daily    Ischemic cardiomyopathy       cetirizine 10 MG tablet    zyrTEC    60 tablet    Take 1 tablet (10 mg) by mouth daily    Seasonal allergic rhinitis       clopidogrel 75 MG tablet    PLAVIX    60 tablet    Take 1 tablet (75 mg) by mouth daily    ST elevation myocardial infarction (STEMI), unspecified artery (H)       digoxin 125 MCG tablet    LANOXIN    60 tablet    Take 1 tablet (125 mcg) by mouth daily    Coronary artery disease involving native coronary artery of native heart without angina pectoris, Cardiogenic shock (H)       finasteride 5 MG tablet    PROSCAR    60 tablet    Take 1 tablet (5 mg) by mouth daily    Hypertrophy of prostate with urinary obstruction       insulin glargine 100 UNIT/ML injection    LANTUS    18 mL    Inject 25 Units Subcutaneous every morning (before breakfast)        insulin pen needle 32G X 4 MM    BD KYLEE U/F    100 each    Use 3 daily or as directed.    Diabetes mellitus type 2, insulin dependent (H)       metoprolol 25 MG 24 hr tablet    TOPROL-XL    90 tablet    Take 1 tablet (25 mg) by mouth daily    Mitral valve  insufficiency, unspecified etiology       pantoprazole 40 MG EC tablet    PROTONIX    60 tablet    Take 1 tablet (40 mg) by mouth daily    Gastrointestinal hemorrhage associated with other gastritis       potassium chloride SA 20 MEQ CR tablet    K-DUR/KLOR-CON M    120 tablet    Take 1 tablet (20 mEq) by mouth 2 times daily    Ischemic cardiomyopathy       Sharps Container Misc     1 each    1 each every 30 days    Diabetes mellitus type 2, insulin dependent (H)       tamsulosin 0.4 MG capsule    FLOMAX    60 capsule    Take 1 capsule (0.4 mg) by mouth daily    Ischemic cardiomyopathy       * Warfarin Therapy Reminder      1 each continuous prn    Deep vein thrombosis (DVT) of left lower extremity, unspecified chronicity, unspecified vein (H)       * warfarin 3 MG tablet    COUMADIN    100 tablet    Take one tablet daily as directed by the ACC    Deep vein thrombosis (DVT) of left lower extremity, unspecified chronicity, unspecified vein (H)       zinc sulfate 220 (50 ZN) MG capsule    ZINCATE    2 capsule    Take 1 capsule (220 mg) by mouth daily    Ischemic cardiomyopathy       * Notice:  This list has 2 medication(s) that are the same as other medications prescribed for you. Read the directions carefully, and ask your doctor or other care provider to review them with you.

## 2017-09-26 NOTE — Clinical Note
Hi!  Can you call patient (will need  or can call his wife)?  I had recommended a colonoscopy for him, but I actually want him to HOLD OFF on getting a colonoscopy until next year (when we can get him off of both coumadin and Plavix). No need to rush the colonoscopy now.  Thank you!  Apolinar

## 2017-09-26 NOTE — PROGRESS NOTES
SUBJECTIVE:                                                      HPI: Luke Henao is a pleasant 52 year old male who presents for a physical.     utilized.     He is DUE for HgbA1c and routine, annual DM ophthalmology exam.     Also, on recent labs, he was noted to be anemic (Hgb 11.0, down from 13.0 in June).     ROS:  Constitutional: denies unintentional weight loss or gain; denies fevers, chills, or sweats     Cardiovascular: denies chest pain, palpitations, or edema  Respiratory: denies cough, wheezing, shortness of breath, or dyspnea on exertion  Gastrointestinal: denies nausea, vomiting, constipation, diarrhea, or abdominal pain  Genitourinary: denies urinary frequency, urgency, dysuria, or hematuria  Integumentary: denies rash or pruritus  Musculoskeletal: denies back pain, muscle pain, joint pain, or joint swelling  Neurologic: denies focal weakness, numbness, or tingling  Hematologic/Immunologic: denies history of anemia or blood transfusions  Endocrine: denies heat or cold intolerance; denies polyuria, polydipsia  Psychiatric: denies anxiety; see preventative health below    Past Medical History:   Diagnosis Date     CAD (coronary artery disease) 2017    RCA STEMI s/p balloon angioplasty and multiple Sofie to RCA, LCx, and LAD     DVT (deep venous thrombosis) (H) 2017    left internal jugular (line-associated); left common femoral; on coumadin     Ischemic cardiomyopathy 2017    EF 30-35%     Ischemic stroke (H) 2017    no residual deficits     Lower GI bleed 2017    due to rectal ulcer     Mitral valve insufficiency, ischemic 2017    s/p Mitral Clip placement      Type 2 diabetes mellitus (H)      Past Surgical History:   Procedure Laterality Date     COLONOSCOPY N/A 4/17/2017    Procedure: COLONOSCOPY;  Surgeon: Rashaad Bundy MD;  Location: [facility]     INSERT INTRAAORTIC BALLOON PUMP Right 4/19/2017    Procedure: INSERT INTRAAORTIC BALLOON PUMP;  Right Subclavian Intra Aortic Balloon  Pump Insertion using Maquet 40cc Ballon Catheter, Implentation of 8mm Gelweave Woven Vascular Prosthesis, Removal of Left Femoral Ballon Pump Catheter, Flouroscopy;  Surgeon: Keshav Leung MD;  Location: [facility] OR     PERCUTANEOUS MITRAL VALVE REPAIR N/A 5/1/2017    Procedure: PERCUTANEOUS MITRAL VALVE REPAIR ANESTHESIA;  Mitraclip Procedure Possible Cardiopulmonary Bypass ;  Surgeon: Ron Cortez MD;  Location: [facility] OR     SUBCLAVIAN AORTIC VALVE IMPLANT N/A 5/8/2017    Procedure: SUBCLAVIAN AORTIC VALVE IMPLANT;  Right Subclavian Graft Removal ;  Surgeon: Keshav Leung MD;  Location: [facility] OR     Family History   Problem Relation Age of Onset     Hypertension Mother      Type 2 Diabetes Father      Hypertension Father      Myocardial Infarction No family hx of      CEREBROVASCULAR DISEASE No family hx of      Coronary Artery Disease Early Onset No family hx of      Colon Cancer No family hx of      Prostate Cancer No family hx of      Occupational History     [employer]      Social History Main Topics     Smoking status: Former Smoker     Packs/day: 0.50     Years: 30.00     Types: Cigarettes     Quit date: 1/1/2017     Smokeless tobacco: Never Used     Alcohol use No     Drug use: No     Sexual activity: Not Currently     Social History Narrative    . Remarried.    4 children with first marriage.    2 grand children.    Walks daily, but no formal exercise.      No Known Allergies     Current Outpatient Prescriptions   Medication Sig     digoxin (LANOXIN) 125 MCG tablet Take 1 tablet (125 mcg) by mouth daily     insulin pen needle (BD KYLEE U/F) 32G X 4 MM Use 3 daily or as directed.     bumetanide (BUMEX) 0.5 MG tablet Take 1 tablet (0.5 mg) by mouth daily     insulin glargine (LANTUS) 100 UNIT/ML injection Inject 25 Units Subcutaneous every morning (before breakfast)     atorvastatin (LIPITOR) 40 MG tablet 1 tablet (40 mg) by Oral or Feeding Tube route daily     cetirizine  "(ZYRTEC) 10 MG tablet Take 1 tablet (10 mg) by mouth daily     clopidogrel (PLAVIX) 75 MG tablet Take 1 tablet (75 mg) by mouth daily     finasteride (PROSCAR) 5 MG tablet Take 1 tablet (5 mg) by mouth daily     pantoprazole (PROTONIX) 40 MG EC tablet Take 1 tablet (40 mg) by mouth daily     potassium chloride SA (K-DUR/KLOR-CON M) 20 MEQ CR tablet Take 1 tablet (20 mEq) by mouth 2 times daily     warfarin (COUMADIN) 3 MG tablet Take one tablet daily as directed by the ACC     baclofen (LIORESAL) 10 MG tablet Take 1 tablet (10 mg) by mouth 3 times daily     ascorbic acid 500 MG TABS Take 1 tablet (500 mg) by mouth daily     beta carotene 61391 UNIT capsule Take 1 capsule (25,000 Units) by mouth daily     tamsulosin (FLOMAX) 0.4 MG capsule Take 1 capsule (0.4 mg) by mouth daily     zinc sulfate (ZINCATE) 220 (50 ZN) MG capsule Take 1 capsule (220 mg) by mouth daily     Immunization History   Administered Date(s) Administered     Pneumococcal 23 valent 05/14/2017     OBJECTIVE:                                                      /84 (BP Location: Left arm, Patient Position: Chair, Cuff Size: Adult Regular)  Pulse 93  Temp 98.5  F (36.9  C) (Oral)  Ht 5' 2\" (1.575 m)  Wt 122 lb 9.6 oz (55.6 kg)  SpO2 100%  BMI 22.42 kg/m2  Constitutional: well-appearing  Eyes: normal conjunctivae and lids; pupils equal, round, and reactive to light  Ears, Nose, Mouth, and Throat: normal ears and nose; tympanic membranes visualized and normal; normal lips, teeth, and gums; no oropharyngeal lesions or ulcers  Neck: supple and symmetric; no lymphadenopathy; no thyromegaly or masses  Respiratory: normal respiratory effort; clear to auscultation bilaterally  Cardiovascular: regular rate and rhythm; pedal pulses palpable; no edema  Gastrointestinal: soft, non-tender, non-distended, and bowel sounds present; no organomegaly or masses  Musculoskeletal: normal gait and station  Psych: normal judgment and insight; normal mood and " affect; recent and remote memory intact; oriented to time, place, and person    PREVENTATIVE HEALTH                                                      BMI: within normal limits   Prostate CA Screening: not medically indicated at this time   Colon CA screening: colonoscopy performed earlier this year - polyps - not resected due to active medical issues  Lung CA screening: not medically indicated at this time   AAA screening: not medically indicated at this time   Screening HCV: not medically indicated at this time   STD testing: no risk factors present  Depression screening: PHQ-2 assessment completed and reviewed - no intervention indicated at this time  Alcohol misuse screening: alcohol use reviewed - no intervention indicated at this time  Immunizations: reviewed; TDAP DUE; flu shot DUE - patient declines    ASSESSMENT/PLAN:                                                       (Z00.00) Routine history and physical examination of adult  (primary encounter diagnosis)  Comment: PMH, PSH, FH, SH, medications, allergies, immunizations, and preventative health measures reviewed.   Plan:    - see below for plans.    - will defer repeat colonoscopy until off AC (on AC 3-6 months for DVTs) and Plavix (on for 1 year after Sofie).    (Z23) Need for vaccination  Plan: TDAP given today.    (D64.9) Anemia, unspecified  Comment: etiology unclear.   Plan:    - CBC, iron studies, B12 level, folate level today.    - recommendations to follow.     (E11.9,  Z79.4) Type 2 diabetes mellitus without complication, with long-term current use of insulin (H)  Plan: HgbA1c today; ophthalmology referral placed - patient to schedule.     The instructions on the AVS were discussed and explained to the patient. Patient expressed understanding of instructions.    (Chart documentation was completed, in part, with Shop pirate voice-recognition software. Even though reviewed, some grammatical, spelling, and word errors may remain.)    Shanna Church  MD   [address] [address]  T: [phone], F: [phone]

## 2017-09-26 NOTE — PATIENT INSTRUCTIONS
Tetanus booster today.    ---    Labs downstairs.     ---    Please schedule appointment with eye doctor - number below.

## 2017-09-26 NOTE — NURSING NOTE
Screening Questionnaire for Adult Immunization    Are you sick today?   No   Do you have allergies to medications, food, a vaccine component or latex?   No   Have you ever had a serious reaction after receiving a vaccination?   No   Do you have a long-term health problem with heart disease, lung disease, asthma, kidney disease, metabolic disease (e.g. diabetes), anemia, or other blood disorder?   No   Do you have cancer, leukemia, HIV/AIDS, or any other immune system problem?   No   In the past 3 months, have you taken medications that affect  your immune system, such as prednisone, other steroids, or anticancer drugs; drugs for the treatment of rheumatoid arthritis, Crohn s disease, or psoriasis; or have you had radiation treatments?   No   Have you had a seizure, or a brain or other nervous system problem?   No   During the past year, have you received a transfusion of blood or blood     products, or been given immune (gamma) globulin or antiviral drug?   No   For women: Are you pregnant or is there a chance you could become        pregnant during the next month?   No   Have you received any vaccinations in the past 4 weeks?   No     Immunization questionnaire answers were all negative.        Per orders of Dr. Church, injection of Tdap given by Samantha Pastrana. Patient instructed to remain in clinic for 15 minutes afterwards, and to report any adverse reaction to me immediately.       Screening performed by Samantha Pastrana on 9/26/2017 at 9:41 AM.

## 2017-10-02 ENCOUNTER — TELEPHONE (OUTPATIENT)
Dept: INTERNAL MEDICINE | Facility: CLINIC | Age: 52
End: 2017-10-02

## 2017-10-02 ENCOUNTER — HOSPITAL ENCOUNTER (OUTPATIENT)
Facility: CLINIC | Age: 52
End: 2017-10-02
Attending: COLON & RECTAL SURGERY | Admitting: COLON & RECTAL SURGERY
Payer: COMMERCIAL

## 2017-10-02 NOTE — TELEPHONE ENCOUNTER
Called and left message to call back to clinic.        Hi!     Can you call patient (will need  or can call his wife)?     I had recommended a colonoscopy for him, but I actually want him to HOLD OFF on getting a colonoscopy until next year (when we can get him off of both coumadin and Plavix). No need to rush the colonoscopy now.     Thank you!     Apolinar

## 2017-10-03 ENCOUNTER — ANTICOAGULATION THERAPY VISIT (OUTPATIENT)
Dept: ANTICOAGULATION | Facility: CLINIC | Age: 52
End: 2017-10-03
Payer: COMMERCIAL

## 2017-10-03 DIAGNOSIS — Z79.01 LONG-TERM (CURRENT) USE OF ANTICOAGULANTS: Primary | ICD-10-CM

## 2017-10-03 DIAGNOSIS — I82.402 DEEP VEIN THROMBOSIS (DVT) OF LEFT LOWER EXTREMITY, UNSPECIFIED CHRONICITY, UNSPECIFIED VEIN (H): ICD-10-CM

## 2017-10-03 LAB — INR POINT OF CARE: 1.7 (ref 0.86–1.14)

## 2017-10-03 PROCEDURE — 85610 PROTHROMBIN TIME: CPT | Mod: QW

## 2017-10-03 PROCEDURE — 36416 COLLJ CAPILLARY BLOOD SPEC: CPT

## 2017-10-03 RX ORDER — WARFARIN SODIUM 3 MG/1
TABLET ORAL
Qty: 100 TABLET | Refills: 0 | Status: SHIPPED | OUTPATIENT
Start: 2017-10-03 | End: 2018-01-12

## 2017-10-03 NOTE — PROGRESS NOTES
ANTICOAGULATION FOLLOW-UP CLINIC VISIT    Patient Name:  Luke Henao  Date:  10/3/2017  Contact Type:  Face to Face    SUBJECTIVE:     Patient Findings     Positives Change in diet/appetite (Pt feeling better, apetite improved)           OBJECTIVE    INR Protime   Date Value Ref Range Status   10/03/2017 1.7 (A) 0.86 - 1.14 Final       ASSESSMENT / PLAN  INR assessment SUB    Recheck INR In: 2 WEEKS    INR Location Clinic      Anticoagulation Summary as of 10/3/2017     INR goal 2.0-3.0   Today's INR 1.7!   Maintenance plan 3 mg (3 mg x 1) every day   Full instructions 10/3: 4.5 mg; 10/4: 4.5 mg; Otherwise 3 mg every day   Weekly total 21 mg   Plan last modified Velvet Jones RN (10/3/2017)   Next INR check 10/17/2017   Target end date     Indications   Cerebrovascular accident (CVA) due to thrombosis of cerebral artery (H) (Resolved) [I63.30]  DVT (deep venous thrombosis) (H) [I82.409]  Long-term (current) use of anticoagulants [Z79.01] [Z79.01]         Anticoagulation Episode Summary     INR check location [facility]    Preferred lab     Send INR reminders to  [facility]    Comments             See the Encounter Report to view Anticoagulation Flowsheet and Dosing Calendar (Go to Encounters tab in chart review, and find the Anticoagulation Therapy Visit)        Velvet Jones RN

## 2017-10-03 NOTE — MR AVS SNAPSHOT
Javierrtish DEBBI Henao   10/3/2017 1:00 PM   Anticoagulation Therapy Visit    Description:  52 year old male   Provider:  [employer] [employer];  ANTICOAGULATION CLINIC   Department:   Anti Coagulation           INR as of 10/3/2017     Today's INR 1.7!      Anticoagulation Summary as of 10/3/2017     INR goal 2.0-3.0   Today's INR 1.7!   Full instructions 10/3: 4.5 mg; 10/4: 4.5 mg; Otherwise 3 mg every day   Next INR check 10/17/2017    Indications   Cerebrovascular accident (CVA) due to thrombosis of cerebral artery (H) (Resolved) [I63.30]  DVT (deep venous thrombosis) (H) [I82.409]  Long-term (current) use of anticoagulants [Z79.01] [Z79.01]         Your next Anticoagulation Clinic appointment(s)     Oct 17, 2017  1:15 PM CDT   Anticoagulation Visit with  ANTICOAGULATION CLINIC   [facility] ([facility]    [address] [address] [phone]              Contact Numbers     [facility]  Please call  [phone] to cancel and/or reschedule your appointment   Please call  [phone] with any problems or questions regarding your therapy.        October 2017 Details    Sun Mon Tue Wed Thu Fri Sat     1               2               3      4.5 mg   See details      4      4.5 mg         5      3 mg         6      3 mg         7      3 mg           8      3 mg         9      3 mg         10      3 mg         11      3 mg         12      3 mg         13      3 mg         14      3 mg           15      3 mg         16      3 mg         17            18               19               20               21                 22               23               24               25               26               27               28                 29               30               31                    Date Details   10/03 This INR check       Date of next INR:  10/17/2017         How to take your warfarin dose     To take:  3 mg Take 1 of the  3 mg tablets.    To take:  4.5 mg Take 1.5 of the 3 mg tablets.

## 2017-10-04 ENCOUNTER — TRANSFERRED RECORDS (OUTPATIENT)
Dept: HEALTH INFORMATION MANAGEMENT | Facility: CLINIC | Age: 52
End: 2017-10-04

## 2017-10-05 NOTE — TELEPHONE ENCOUNTER
Called and left message to call back to clinic.    In the mean time pt is scheduled for a colonoscopy on 10/25/17. Contacted his daughter and advised her on Dr Church's message.    Will postpone for a couple of days to recheck. If the colonoscopy is removed from his schedule we can be done with the message.

## 2017-10-10 ENCOUNTER — ALLIED HEALTH/NURSE VISIT (OUTPATIENT)
Dept: EDUCATION SERVICES | Facility: CLINIC | Age: 52
End: 2017-10-10
Payer: COMMERCIAL

## 2017-10-10 VITALS — BODY MASS INDEX: 22.15 KG/M2 | WEIGHT: 121.1 LBS

## 2017-10-10 DIAGNOSIS — Z79.4 TYPE 2 DIABETES MELLITUS WITHOUT COMPLICATION, WITH LONG-TERM CURRENT USE OF INSULIN (H): ICD-10-CM

## 2017-10-10 DIAGNOSIS — I25.10 CAD (CORONARY ARTERY DISEASE): Primary | ICD-10-CM

## 2017-10-10 DIAGNOSIS — E11.9 TYPE 2 DIABETES MELLITUS WITHOUT COMPLICATION, WITH LONG-TERM CURRENT USE OF INSULIN (H): ICD-10-CM

## 2017-10-10 PROCEDURE — 97802 MEDICAL NUTRITION INDIV IN: CPT

## 2017-10-10 NOTE — PROGRESS NOTES
Medical Nutrition Therapy  Visit Type:Initial assessment and intervention  Luke Henao presents today for MNT and education related to coronary artery disease in T2D, recent tube feeding with pressure ulcer due to poor nutrition status. Per referral, eating normally now.    Luke says he had a heart attack and stroke and was in the hospital on tube feeding. Describes eating well now.   He is accompanied by self, Sami .    ASSESSMENT:   Patient comments/concerns relating to nutrition: He wants to know what kind of diet is good for my linh, especially for diabetes.   He denies having had diabetes education.    NUTRITION HISTORY:  At home no salt, using very small amount corn oil in cooking  Meals: Describes eating 4-5 small meals/day per MD recommendation.   Foods include vegetables- lettuce, spinach made into soup with chicken or beef, similar at each meal, might have sandwich or rice but very little bit. Eats fruit 4 fruit per week, peach or apple.   He tells me that he eliminated grains, starch with diabetes. Used to eat an apple every day but has discontinued.   Used to eat fried food but has stopped that, also eliminated alcohol.   He says he does not have any friends any more, they used to come over for a beer and to eat, but they won't any more.    The family has not used tofu, they do not eat beans. Traditional Sami diet.  Some peanuts eaten for heart health.     Luke likes to dine out, asks about foot long tuna sandwich at [facility] or #7 at [facility].     Snacks: no  Beverages: Water only during/day    Misses meals? no    Previous diet education:  No     EXERCISE: walk 30 minutes/day    SOCIO/ECONOMIC:   Lives with: spouse and step daughter    MEDICATIONS:  Current Outpatient Prescriptions   Medication     warfarin (COUMADIN) 3 MG tablet     blood glucose monitoring (NO BRAND SPECIFIED) test strip     digoxin (LANOXIN) 125 MCG tablet     insulin pen needle (BD KYLEE U/F) 32G X 4 MM      bumetanide (BUMEX) 0.5 MG tablet     alcohol swab prep pads     insulin glargine (LANTUS) 100 UNIT/ML injection     atorvastatin (LIPITOR) 40 MG tablet     cetirizine (ZYRTEC) 10 MG tablet     clopidogrel (PLAVIX) 75 MG tablet     finasteride (PROSCAR) 5 MG tablet     pantoprazole (PROTONIX) 40 MG EC tablet     potassium chloride SA (K-DUR/KLOR-CON M) 20 MEQ CR tablet     baclofen (LIORESAL) 10 MG tablet     acetaminophen (TYLENOL) 500 MG tablet     ASPIRIN NOT PRESCRIBED (INTENTIONAL)     ascorbic acid 500 MG TABS     beta carotene 51622 UNIT capsule     tamsulosin (FLOMAX) 0.4 MG capsule     zinc sulfate (ZINCATE) 220 (50 ZN) MG capsule     Sharps Container MISC     Warfarin Therapy Reminder     No current facility-administered medications for this visit.        LABS:  Last Basic Metabolic Panel:  Lab Results   Component Value Date     09/26/2017      Lab Results   Component Value Date    POTASSIUM 3.7 09/26/2017     Lab Results   Component Value Date    CHLORIDE 106 09/26/2017     Lab Results   Component Value Date    ROB 9.2 09/26/2017     Lab Results   Component Value Date    CO2 28 09/26/2017     Lab Results   Component Value Date    BUN 23 09/26/2017     Lab Results   Component Value Date    CR 1.16 09/26/2017     Lab Results   Component Value Date     09/26/2017       ANTHROPOMETRICS:  Vitals: There were no vitals taken for this visit.  There is no height or weight on file to calculate BMI.      Wt Readings from Last 5 Encounters:   09/26/17 55.6 kg (122 lb 9.6 oz)   09/11/17 55.7 kg (122 lb 12.8 oz)   08/17/17 54.3 kg (119 lb 11.2 oz)   08/10/17 54.4 kg (119 lb 14.4 oz)   08/04/17 53.6 kg (118 lb 2.1 oz)       Weight Change: reports weight loss of 25# in hospital, but stable now at ~122#    NUTRITION DIAGNOSIS: Food- and nutrition-related knowledge deficit related to no prior diabetes education, seeking additional heart healthy recommendations as evidenced by history, questions  asked.    NUTRITION INTERVENTION:  Nutrition Prescription: Carbohydrate Intake: 30 grams at each of 4-5 small meals/day.  Protein Intake: choose very lean protein or plant protein, not fried, skin removed, small portions, egg whites only  Fat Intake: no added fat, recommended olive or canola oil over corn oil  Sodium Intake: No added salt reviewed, also reviewed preference for fresh or frozen produce, meat, but if used canned veg, rinse off salt before heating, rinse lunch meats before eating.  Instructed on carb foods and importance of consistent intake, 2 carb/meal (30 grams) including whole grains and fruits for beneficial fiber.    Regarding eating out, discussed that tuna sub and donn karime's sandwich are high fat, high salt, not recommended. Decrease portion and frequency if choose to go.    Education given to support: carb counting, sodium, fat modification, fiber and heart healthy diet  Education Materials Provided: My Plate Planner/Choose My Plate and Heart Healthy Food Choices    PATIENT'S BEHAVIOR CHANGE GOALS:   See Patient Instructions for patient stated behavior change goals. AVS was printed and given to patient at today's appointment.    MONITOR / EVALUATE:  RD will monitor/evaluate:  Food and nutrition knowledge / skills  Food / Beverage / Nutrient intake   Progress toward meeting stated nutrition-related goals    FOLLOW-UP:  Follow up with RD as needed.    Sruthi Aranda RD, LD, CDE    Time spent in minutes: 60  Encounter: Individual

## 2017-10-10 NOTE — PATIENT INSTRUCTIONS
Recommendations:  1) Choose 2 carb servings at each meal.  2) Choose fresh or frozen fruits and vegetables.  3) Eat whole grains.  4) Continue low salt.  5) Keep portions of meat small, choose low fat, or use some tofu or egg white or beans instead of meat.  6) If eat out, only a few times per month.

## 2017-10-16 ENCOUNTER — PRE VISIT (OUTPATIENT)
Dept: CARDIOLOGY | Facility: CLINIC | Age: 52
End: 2017-10-16

## 2017-10-16 ENCOUNTER — OFFICE VISIT (OUTPATIENT)
Dept: CARDIOLOGY | Facility: CLINIC | Age: 52
End: 2017-10-16
Attending: INTERNAL MEDICINE
Payer: COMMERCIAL

## 2017-10-16 VITALS
HEIGHT: 62 IN | BODY MASS INDEX: 22.21 KG/M2 | SYSTOLIC BLOOD PRESSURE: 130 MMHG | DIASTOLIC BLOOD PRESSURE: 90 MMHG | WEIGHT: 120.7 LBS | OXYGEN SATURATION: 98 % | HEART RATE: 97 BPM

## 2017-10-16 DIAGNOSIS — I34.0 MITRAL VALVE INSUFFICIENCY, UNSPECIFIED ETIOLOGY: ICD-10-CM

## 2017-10-16 DIAGNOSIS — I25.5 ISCHEMIC CARDIOMYOPATHY: Primary | ICD-10-CM

## 2017-10-16 PROCEDURE — T1013 SIGN LANG/ORAL INTERPRETER: HCPCS | Mod: U3,ZF

## 2017-10-16 PROCEDURE — 93010 ELECTROCARDIOGRAM REPORT: CPT | Mod: ZP | Performed by: INTERNAL MEDICINE

## 2017-10-16 PROCEDURE — 99214 OFFICE O/P EST MOD 30 MIN: CPT | Mod: GC | Performed by: INTERNAL MEDICINE

## 2017-10-16 PROCEDURE — 93005 ELECTROCARDIOGRAM TRACING: CPT | Mod: ZF

## 2017-10-16 PROCEDURE — 99212 OFFICE O/P EST SF 10 MIN: CPT | Mod: ZF

## 2017-10-16 RX ORDER — LIDOCAINE 40 MG/G
CREAM TOPICAL
Status: CANCELLED | OUTPATIENT
Start: 2017-10-16

## 2017-10-16 ASSESSMENT — PAIN SCALES - GENERAL: PAINLEVEL: NO PAIN (0)

## 2017-10-16 NOTE — LETTER
10/16/2017      RE: Luke Henao  [address]  [facility] [address]       Dear Colleague,    Thank you for the opportunity to participate in the care of your patient, Luke Henao, at the [facility] acute [facility]. Please see a copy of my visit note below.    Cardiac Electrophysiology Clinic    Chief Complaint:  Referred for primary prevention ICD    HPI:  Mr. Henao is a 51 yo male with heart failure, reduced LV EF (30-35%), and NYHA Class II-III symptoms due to ischemic cardiomyopathy after a prolonged admission from March through May of 2017 for an inferior STEMI, cardiogenic shock, and severe MR.  He was revascularized with drug-eluting stents to the RCA, LAD, and LCx.  When he would not stabilize without IABP and inotropic support he also underwent percutaneous repair of the mitral valve with a Mitraclip, and was subsequently stabilized and discharged to acute rehab.    He is no longer on diuretic therapy and denies abdominal bloating and peripheral edema.  He can walk several blocks without limitation on most days but occasionally he is limited by dyspnea and/or fatigue and/or the sensation of instability.  He denies orthopnea and PND.  He has not had angina or syncope or palpitations.    It appears that he was on lisinopril and metoprolol XL up until a visit with his primary care physician Dr. Church on 9/26/2017, at which time both were discontinued in EPIC, but the reason for discontinuation is unclear.  His BP at the visit was 114/84 and his pulse was 93.  Nonetheless, his LV EF has not recovered from 30-35% in 3/2017 despite treatment with ACE inhibition and beta blockade following revascularization.    An EKG performed in clinic today and personally reviewed shows sinus rhythm at 100 bpm, normal axis, prior inferior infarction,  msec, QTc 438 msec.      Current Outpatient Prescriptions on File Prior to Visit:  warfarin (COUMADIN) 3 MG tablet Take one  tablet daily as directed by the ACC   blood glucose monitoring (NO BRAND SPECIFIED) test strip Use to test blood sugar 3 times daily or as directed.   digoxin (LANOXIN) 125 MCG tablet Take 1 tablet (125 mcg) by mouth daily   insulin pen needle (BD KYLEE U/F) 32G X 4 MM Use 3 daily or as directed.   bumetanide (BUMEX) 0.5 MG tablet Take 1 tablet (0.5 mg) by mouth daily   alcohol swab prep pads Use to swab area of injection/mary alice as directed 3 x per day   insulin glargine (LANTUS) 100 UNIT/ML injection Inject 25 Units Subcutaneous every morning (before breakfast)   atorvastatin (LIPITOR) 40 MG tablet 1 tablet (40 mg) by Oral or Feeding Tube route daily   cetirizine (ZYRTEC) 10 MG tablet Take 1 tablet (10 mg) by mouth daily   clopidogrel (PLAVIX) 75 MG tablet Take 1 tablet (75 mg) by mouth daily   finasteride (PROSCAR) 5 MG tablet Take 1 tablet (5 mg) by mouth daily   pantoprazole (PROTONIX) 40 MG EC tablet Take 1 tablet (40 mg) by mouth daily   potassium chloride SA (K-DUR/KLOR-CON M) 20 MEQ CR tablet Take 1 tablet (20 mEq) by mouth 2 times daily   baclofen (LIORESAL) 10 MG tablet Take 1 tablet (10 mg) by mouth 3 times daily   acetaminophen (TYLENOL) 500 MG tablet Take 2 tablets (1,000 mg) by mouth every 6 hours as needed for mild pain   ASPIRIN NOT PRESCRIBED (INTENTIONAL) Please choose reason not prescribed, below   ascorbic acid 500 MG TABS Take 1 tablet (500 mg) by mouth daily   beta carotene 71328 UNIT capsule Take 1 capsule (25,000 Units) by mouth daily   tamsulosin (FLOMAX) 0.4 MG capsule Take 1 capsule (0.4 mg) by mouth daily   zinc sulfate (ZINCATE) 220 (50 ZN) MG capsule Take 1 capsule (220 mg) by mouth daily   Sharps Container MISC 1 each every 30 days   Warfarin Therapy Reminder 1 each continuous prn     No current facility-administered medications on file prior to visit.   No Known Allergies    Past Medical History:   Diagnosis Date     CAD (coronary artery disease) 2017    RCA STEMI s/p balloon  angioplasty and multiple Sofie to RCA, LCx, and LAD     DVT (deep venous thrombosis) (H) 2017    left internal jugular (line-associated); left common femoral; on coumadin     Ischemic cardiomyopathy 2017    EF 30-35%     Ischemic stroke (H) 2017    no residual deficits     Lower GI bleed 2017    due to rectal ulcer     Mitral valve insufficiency, ischemic 2017    s/p Mitral Clip placement      Type 2 diabetes mellitus (H)      Past Surgical History:   Procedure Laterality Date     COLONOSCOPY N/A 4/17/2017    Procedure: COLONOSCOPY;  Surgeon: Rashaad Bundy MD;  Location: [facility] GI     INSERT INTRAAORTIC BALLOON PUMP Right 4/19/2017    Procedure: INSERT INTRAAORTIC BALLOON PUMP;  Right Subclavian Intra Aortic Balloon Pump Insertion using Maquet 40cc Ballon Catheter, Implentation of 8mm Gelweave Woven Vascular Prosthesis, Removal of Left Femoral Ballon Pump Catheter, Flouroscopy;  Surgeon: Keshav Leung MD;  Location: [facility] OR     PERCUTANEOUS MITRAL VALVE REPAIR N/A 5/1/2017    Procedure: PERCUTANEOUS MITRAL VALVE REPAIR ANESTHESIA;  Mitraclip Procedure Possible Cardiopulmonary Bypass ;  Surgeon: Ron Cortez MD;  Location: [facility] OR     SUBCLAVIAN AORTIC VALVE IMPLANT N/A 5/8/2017    Procedure: SUBCLAVIAN AORTIC VALVE IMPLANT;  Right Subclavian Graft Removal ;  Surgeon: Keshav Leung MD;  Location: [facility] OR     Family History   Problem Relation Age of Onset     Hypertension Mother      Type 2 Diabetes Father      Hypertension Father      Myocardial Infarction No family hx of      CEREBROVASCULAR DISEASE No family hx of      Coronary Artery Disease Early Onset No family hx of      Colon Cancer No family hx of      Prostate Cancer No family hx of      Social History     Social History     Marital status:      Spouse name: N/A     Number of children: N/A     Years of education: N/A     Occupational History     [employer]      Social History Main Topics     Smoking status:  "Former Smoker     Packs/day: 0.50     Years: 30.00     Types: Cigarettes     Quit date: 1/1/2017     Smokeless tobacco: Never Used     Alcohol use No     Drug use: No     Sexual activity: Not Currently     Other Topics Concern     Not on file     Social History Narrative    . Remarried.    4 children with first marriage.    2 grand children.    Walks daily, but no formal exercise.            A complete review of systems is negative except as noted above in the HPI.      Physical Examination:  Vitals: /90 (BP Location: Left arm, Patient Position: Chair, Cuff Size: Adult Small)  Pulse 97  Ht 1.575 m (5' 2\")  Wt 54.7 kg (120 lb 11.2 oz)  SpO2 98%  BMI 22.08 kg/m2  GENERAL APPEARANCE: comfortable appearing male with unlabored breathing.  HEENT: anicteric sclerae.  Conjugate gaze.  NECK:  JVP is not visible when sitting upright unless abdominal pressure is applied.    CHEST:  lungs are clear on auscultation  CARDIOVASCULAR:  Regular rhythm with normal S1 and S2.  No murmur or rub is present.  Extremities are warm.  No peripheral edema is present.  ABDOMEN:  soft, not tender or distended.  NEURO:  Alert and fully oriented.  He answers questions appropriately via an .  SKIN:  no petechiae/ecchymoses, not jaundiced.      Labs, imaging, and procedures were reviewed:  Recent Labs   Lab Test 10/03/17 09/12/17 08/14/17 07/20/17   INR  1.7*  3.6*  2.9*  3.2*     Recent Labs   Lab Test  09/26/17   0947  09/11/17   0835  07/12/17   0930  07/03/17   1559   CR  1.16  1.09  1.25  1.26*   HGB  11.1*  11.0*   --    --    PLT  274  275   --    --      Echocardiogram 8/10/17 showed an LV EF of 30-35%, traced at 32%, with mildly reduced RV function, mild MR with a mean gradient of 5 mmHg across the MV, RVSP of 40 mmHg + RA pressure, and normal sized IVC.        Impression:  1. Ischemic cardiomyopathy and chronic systolic heart failure, NYHA class II-III, LV EF 32% by echo despite several months of medical " therapy with lisinopril and metoprolol, both of which were recently discontinued on 9/26 by his PCP after she conferred with Dr. Cortez per the pt.  He is euvolemic on examination and is not on diuretic therapy.  He is on digoxin 125 mcg daily.  2. CAD and inferior STEMI in 3/2017 complicated by severe MR and cardiogenic shock, s/p FEMI placement in the RCA, LAD, and LCx and later percutaneous MV repair with MitraClip, currently on warfarin & clopidogrel & high-intensity atorvastatin.  3. Diabetes mellitus, on glargine insulin      Recommendations:  He has a class I indication for ICD implantation to reduce the risk of SCD from heart failure with reduced LV EF despite revascularization and >3 months of medical therapy.  After we reviewed the indication for and risks of ICD implantation (including bleeding, site infection, pericardial effusion, pneumothorax, and others) he elected to undergo ICD implantation.  We instructed him to continue his warfarin and clopidogrel, acknowledging that the benefit of maintaining stent patency outweighed the risk of jonelle-procedural bleeding.  He will be called to schedule the ICD implantation and provide instructions regarding his insulin.    We recommend resuming the ACE inhibitor and beta blocker for his cardiomyopathy if there are no contraindications to either.  His BP and HR today would certainly tolerate ACEi & BB.    I appreciate the chance to help with Mr. Henao's care. Please let me know if you have any questions or concerns.  I discussed the patient with Dr. Price Patel.    Johnie Puente MD  Cardiovascular disease fellow

## 2017-10-16 NOTE — MR AVS SNAPSHOT
After Visit Summary   10/16/2017    Luke Henao    MRN: [id number]           Patient Information     Date Of Birth          1965        Visit Information        Provider Department      10/16/2017 1:25 PM Biju Denton; Price Patel MD [facility]        Today's Diagnoses     Ischemic cardiomyopathy    -  1    Mitral valve insufficiency, unspecified etiology           Follow-ups after your visit        Your next 10 appointments already scheduled     Oct 17, 2017  1:00 PM CDT   Anticoagulation Visit with [facility] ANTICOAGULATION CLINIC   [facility] ([facility])    [address] [id number] [phone]            Oct 25, 2017   Procedure with Cony Sampson MD   [facility] ([facility])    [address] [facility] [address]   [phone]           [facility] is located at [address]            Nov 30, 2017 10:30 AM CST   Lab with  LAB   [facility] ([facility])    [address] [place] [id number] [phone]            Nov 30, 2017 11:00 AM CST   (Arrive by 10:45 AM)   CORE RETURN with Emily Collado NP   [facility] ([facility])    [address] [place] [id number] [phone]              Future tests that were ordered for you today     Open Future Orders        Priority Expected Expires Ordered    EP ICD/Pacemaker/Loop Recorder Routine  10/16/2018 10/16/2017    EKG 12-lead complete with read (future) Routine  10/16/2018 10/16/2017            Who to contact     If you have questions or need follow up information about today's clinic visit or your schedule please contact [facility] directly at [phone].  Normal or non-critical lab and imaging results will be communicated to you by MyChart, letter or phone  "within 4 business days after the clinic has received the results. If you do not hear from us within 7 days, please contact the clinic through Sympler or phone. If you have a critical or abnormal lab result, we will notify you by phone as soon as possible.  Submit refill requests through Sympler or call your pharmacy and they will forward the refill request to us. Please allow 3 business days for your refill to be completed.          Additional Information About Your Visit        Sympler Information     Sympler lets you send messages to your doctor, view your test results, renew your prescriptions, schedule appointments and more. To sign up, go to [email or web address] . Click on \"Log in\" on the left side of the screen, which will take you to the Welcome page. Then click on \"Sign up Now\" on the right side of the page.     You will be asked to enter the access code listed below, as well as some personal information. Please follow the directions to create your username and password.     Your access code is: [email or web address]  Expires: 2017  6:30 AM     Your access code will  in 90 days. If you need help or a new code, please call your [place] clinic or [phone].        Care EveryWhere ID     This is your Care EveryWhere ID. This could be used by other organizations to access your [place] medical records  [id number]        Your Vitals Were     Pulse Height Pulse Oximetry BMI (Body Mass Index)          97 5' 2\" (1.575 m) 98% 22.08 kg/m2         Blood Pressure from Last 3 Encounters:   10/16/17 130/90   17 114/84   17 95/66    Weight from Last 3 Encounters:   10/16/17 120 lb 11.2 oz (54.7 kg)   10/10/17 121 lb 1.6 oz (54.9 kg)   17 122 lb 9.6 oz (55.6 kg)              We Performed the Following     EKG 12-lead, tracing only (Future)        Primary Care Provider Office Phone # Fax #    Shanna Church [phone]       [address] [place] [address]        Equal " Access to Services     [facility]: Hadii aad ku hadchilonick Obiali, waalfonzoda luqadaha, qaybta kanikitakaren vergara. So [facility] [phone].    ATENCIÓN: Si habla español, tiene a suarez disposición servicios gratuitos de asistencia lingüística. Llame al [phone].    We comply with applicable federal civil rights laws and [place] laws. We do not discriminate on the basis of race, color, national origin, age, disability, sex, sexual orientation, or gender identity.            Thank you!     Thank you for choosing [facility]  for your care. Our goal is always to provide you with excellent care. Hearing back from our patients is one way we can continue to improve our services. Please take a few minutes to complete the written survey that you may receive in the mail after your visit with us. Thank you!             Your Updated Medication List - Protect others around you: Learn how to safely use, store and throw away your medicines at www.disposemymeds.org.          This list is accurate as of: 10/16/17  4:43 PM.  Always use your most recent med list.                   Brand Name Dispense Instructions for use Diagnosis    acetaminophen 500 MG tablet    TYLENOL    30 tablet    Take 2 tablets (1,000 mg) by mouth every 6 hours as needed for mild pain    Lower extremity pain, bilateral, Shakiness       alcohol swab prep pads     100 each    Use to swab area of injection/mary alice as directed 3 x per day        ascorbic acid 500 MG Tabs     2 tablet    Take 1 tablet (500 mg) by mouth daily    Coronary artery disease involving native coronary artery of native heart without angina pectoris       ASPIRIN NOT PRESCRIBED    INTENTIONAL    0 each    Please choose reason not prescribed, below    Cardiogenic shock (H), Type 2 diabetes mellitus without complication, with long-term current use of insulin (H)       atorvastatin 40 MG tablet    LIPITOR    60 tablet    1 tablet (40 mg) by Oral  or Feeding Tube route daily    Coronary artery disease involving native coronary artery of native heart without angina pectoris, Cardiogenic shock (H)       baclofen 10 MG tablet    LIORESAL    90 tablet    Take 1 tablet (10 mg) by mouth 3 times daily    Lower limb symptom       beta carotene 19411 UNIT capsule     2 capsule    Take 1 capsule (25,000 Units) by mouth daily    Ischemic cardiomyopathy       blood glucose monitoring test strip    no brand specified    100 strip    Use to test blood sugar 3 times daily or as directed.    Diabetes mellitus type 2, insulin dependent (H)       bumetanide 0.5 MG tablet    BUMEX    120 tablet    Take 1 tablet (0.5 mg) by mouth daily    Ischemic cardiomyopathy       cetirizine 10 MG tablet    zyrTEC    60 tablet    Take 1 tablet (10 mg) by mouth daily    Seasonal allergic rhinitis       clopidogrel 75 MG tablet    PLAVIX    60 tablet    Take 1 tablet (75 mg) by mouth daily    ST elevation myocardial infarction (STEMI), unspecified artery (H)       digoxin 125 MCG tablet    LANOXIN    60 tablet    Take 1 tablet (125 mcg) by mouth daily    Coronary artery disease involving native coronary artery of native heart without angina pectoris, Cardiogenic shock (H)       finasteride 5 MG tablet    PROSCAR    60 tablet    Take 1 tablet (5 mg) by mouth daily    Hypertrophy of prostate with urinary obstruction       insulin glargine 100 UNIT/ML injection    LANTUS    18 mL    Inject 25 Units Subcutaneous every morning (before breakfast)        insulin pen needle 32G X 4 MM    BD KYLEE U/F    100 each    Use 3 daily or as directed.    Diabetes mellitus type 2, insulin dependent (H)       pantoprazole 40 MG EC tablet    PROTONIX    60 tablet    Take 1 tablet (40 mg) by mouth daily    Gastrointestinal hemorrhage associated with other gastritis       potassium chloride SA 20 MEQ CR tablet    K-DUR/KLOR-CON M    120 tablet    Take 1 tablet (20 mEq) by mouth 2 times daily    Ischemic  cardiomyopathy       Sharps Container Misc     1 each    1 each every 30 days    Diabetes mellitus type 2, insulin dependent (H)       tamsulosin 0.4 MG capsule    FLOMAX    60 capsule    Take 1 capsule (0.4 mg) by mouth daily    Ischemic cardiomyopathy       * Warfarin Therapy Reminder      1 each continuous prn    Deep vein thrombosis (DVT) of left lower extremity, unspecified chronicity, unspecified vein (H)       * warfarin 3 MG tablet    COUMADIN    100 tablet    Take one tablet daily as directed by the ACC    Deep vein thrombosis (DVT) of left lower extremity, unspecified chronicity, unspecified vein (H)       zinc sulfate 220 (50 ZN) MG capsule    ZINCATE    2 capsule    Take 1 capsule (220 mg) by mouth daily    Ischemic cardiomyopathy       * Notice:  This list has 2 medication(s) that are the same as other medications prescribed for you. Read the directions carefully, and ask your doctor or other care provider to review them with you.

## 2017-10-16 NOTE — PROGRESS NOTES
Cardiac Electrophysiology Clinic    Chief Complaint:  Referred for primary prevention ICD    HPI:  Mr. Henao is a 51 yo male with heart failure, reduced LV EF (30-35%), and NYHA Class II-III symptoms due to ischemic cardiomyopathy after a prolonged admission from March through May of 2017 for an inferior STEMI, cardiogenic shock, and severe MR.  He was revascularized with drug-eluting stents to the RCA, LAD, and LCx.  When he would not stabilize without IABP and inotropic support he also underwent percutaneous repair of the mitral valve with a Mitraclip, and was subsequently stabilized and discharged to acute rehab.    He is no longer on diuretic therapy and denies abdominal bloating and peripheral edema.  He can walk several blocks without limitation on most days but occasionally he is limited by dyspnea and/or fatigue and/or the sensation of instability.  He denies orthopnea and PND.  He has not had angina or syncope or palpitations.    It appears that he was on lisinopril and metoprolol XL up until a visit with his primary care physician Dr. Church on 9/26/2017, at which time both were discontinued in EPIC, but the reason for discontinuation is unclear.  His BP at the visit was 114/84 and his pulse was 93.  Nonetheless, his LV EF has not recovered from 30-35% in 3/2017 despite treatment with ACE inhibition and beta blockade following revascularization.    An EKG performed in clinic today and personally reviewed shows sinus rhythm at 100 bpm, normal axis, prior inferior infarction,  msec, QTc 438 msec.      Current Outpatient Prescriptions on File Prior to Visit:  warfarin (COUMADIN) 3 MG tablet Take one tablet daily as directed by the ACC   blood glucose monitoring (NO BRAND SPECIFIED) test strip Use to test blood sugar 3 times daily or as directed.   digoxin (LANOXIN) 125 MCG tablet Take 1 tablet (125 mcg) by mouth daily   insulin pen needle (BD KYLEE U/F) 32G X 4 MM Use 3 daily or as directed.    bumetanide (BUMEX) 0.5 MG tablet Take 1 tablet (0.5 mg) by mouth daily   alcohol swab prep pads Use to swab area of injection/mary alice as directed 3 x per day   insulin glargine (LANTUS) 100 UNIT/ML injection Inject 25 Units Subcutaneous every morning (before breakfast)   atorvastatin (LIPITOR) 40 MG tablet 1 tablet (40 mg) by Oral or Feeding Tube route daily   cetirizine (ZYRTEC) 10 MG tablet Take 1 tablet (10 mg) by mouth daily   clopidogrel (PLAVIX) 75 MG tablet Take 1 tablet (75 mg) by mouth daily   finasteride (PROSCAR) 5 MG tablet Take 1 tablet (5 mg) by mouth daily   pantoprazole (PROTONIX) 40 MG EC tablet Take 1 tablet (40 mg) by mouth daily   potassium chloride SA (K-DUR/KLOR-CON M) 20 MEQ CR tablet Take 1 tablet (20 mEq) by mouth 2 times daily   baclofen (LIORESAL) 10 MG tablet Take 1 tablet (10 mg) by mouth 3 times daily   acetaminophen (TYLENOL) 500 MG tablet Take 2 tablets (1,000 mg) by mouth every 6 hours as needed for mild pain   ASPIRIN NOT PRESCRIBED (INTENTIONAL) Please choose reason not prescribed, below   ascorbic acid 500 MG TABS Take 1 tablet (500 mg) by mouth daily   beta carotene 59687 UNIT capsule Take 1 capsule (25,000 Units) by mouth daily   tamsulosin (FLOMAX) 0.4 MG capsule Take 1 capsule (0.4 mg) by mouth daily   zinc sulfate (ZINCATE) 220 (50 ZN) MG capsule Take 1 capsule (220 mg) by mouth daily   Sharps Container MISC 1 each every 30 days   Warfarin Therapy Reminder 1 each continuous prn     No current facility-administered medications on file prior to visit.   No Known Allergies    Past Medical History:   Diagnosis Date     CAD (coronary artery disease) 2017    RCA STEMI s/p balloon angioplasty and multiple Sofie to RCA, LCx, and LAD     DVT (deep venous thrombosis) (H) 2017    left internal jugular (line-associated); left common femoral; on coumadin     Ischemic cardiomyopathy 2017    EF 30-35%     Ischemic stroke (H) 2017    no residual deficits     Lower GI bleed 2017    due to  rectal ulcer     Mitral valve insufficiency, ischemic 2017    s/p Mitral Clip placement      Type 2 diabetes mellitus (H)      Past Surgical History:   Procedure Laterality Date     COLONOSCOPY N/A 4/17/2017    Procedure: COLONOSCOPY;  Surgeon: Rashaad Bundy MD;  Location: [facility]     INSERT INTRAAORTIC BALLOON PUMP Right 4/19/2017    Procedure: INSERT INTRAAORTIC BALLOON PUMP;  Right Subclavian Intra Aortic Balloon Pump Insertion using Maquet 40cc Ballon Catheter, Implentation of 8mm Gelweave Woven Vascular Prosthesis, Removal of Left Femoral Ballon Pump Catheter, Flouroscopy;  Surgeon: Keshav Leung MD;  Location: [facility] OR     PERCUTANEOUS MITRAL VALVE REPAIR N/A 5/1/2017    Procedure: PERCUTANEOUS MITRAL VALVE REPAIR ANESTHESIA;  Mitraclip Procedure Possible Cardiopulmonary Bypass ;  Surgeon: Ron Cortez MD;  Location: [facility] OR     SUBCLAVIAN AORTIC VALVE IMPLANT N/A 5/8/2017    Procedure: SUBCLAVIAN AORTIC VALVE IMPLANT;  Right Subclavian Graft Removal ;  Surgeon: Keshav Leung MD;  Location: [facility] OR     Family History   Problem Relation Age of Onset     Hypertension Mother      Type 2 Diabetes Father      Hypertension Father      Myocardial Infarction No family hx of      CEREBROVASCULAR DISEASE No family hx of      Coronary Artery Disease Early Onset No family hx of      Colon Cancer No family hx of      Prostate Cancer No family hx of      Social History     Social History     Marital status:      Spouse name: N/A     Number of children: N/A     Years of education: N/A     Occupational History     [employer]      Social History Main Topics     Smoking status: Former Smoker     Packs/day: 0.50     Years: 30.00     Types: Cigarettes     Quit date: 1/1/2017     Smokeless tobacco: Never Used     Alcohol use No     Drug use: No     Sexual activity: Not Currently     Other Topics Concern     Not on file     Social History Narrative    . Remarried.    4  "children with first marriage.    2 grand children.    Walks daily, but no formal exercise.            A complete review of systems is negative except as noted above in the HPI.      Physical Examination:  Vitals: /90 (BP Location: Left arm, Patient Position: Chair, Cuff Size: Adult Small)  Pulse 97  Ht 1.575 m (5' 2\")  Wt 54.7 kg (120 lb 11.2 oz)  SpO2 98%  BMI 22.08 kg/m2  GENERAL APPEARANCE: comfortable appearing male with unlabored breathing.  HEENT: anicteric sclerae.  Conjugate gaze.  NECK:  JVP is not visible when sitting upright unless abdominal pressure is applied.    CHEST:  lungs are clear on auscultation  CARDIOVASCULAR:  Regular rhythm with normal S1 and S2.  No murmur or rub is present.  Extremities are warm.  No peripheral edema is present.  ABDOMEN:  soft, not tender or distended.  NEURO:  Alert and fully oriented.  He answers questions appropriately via an .  SKIN:  no petechiae/ecchymoses, not jaundiced.      Labs, imaging, and procedures were reviewed:  Recent Labs   Lab Test 10/03/17 09/12/17 08/14/17 07/20/17   INR  1.7*  3.6*  2.9*  3.2*     Recent Labs   Lab Test  09/26/17   0947  09/11/17   0835  07/12/17   0930  07/03/17   1559   CR  1.16  1.09  1.25  1.26*   HGB  11.1*  11.0*   --    --    PLT  274  275   --    --      Echocardiogram 8/10/17 showed an LV EF of 30-35%, traced at 32%, with mildly reduced RV function, mild MR with a mean gradient of 5 mmHg across the MV, RVSP of 40 mmHg + RA pressure, and normal sized IVC.        Impression:  1. Ischemic cardiomyopathy and chronic systolic heart failure, NYHA class II-III, LV EF 32% by echo despite several months of medical therapy with lisinopril and metoprolol, both of which were recently discontinued on 9/26 by his PCP after she conferred with Dr. Cortez per the pt.  He is euvolemic on examination and is not on diuretic therapy.  He is on digoxin 125 mcg daily.  2. CAD and inferior STEMI in 3/2017 complicated by severe MR " and cardiogenic shock, s/p FEMI placement in the RCA, LAD, and LCx and later percutaneous MV repair with MitraClip, currently on warfarin & clopidogrel & high-intensity atorvastatin.  3. Diabetes mellitus, on glargine insulin      Recommendations:  He has a class I indication for ICD implantation to reduce the risk of SCD from heart failure with reduced LV EF despite revascularization and >3 months of medical therapy.  After we reviewed the indication for and risks of ICD implantation (including bleeding, site infection, pericardial effusion, pneumothorax, and others) he elected to undergo ICD implantation.  We instructed him to continue his warfarin and clopidogrel, acknowledging that the benefit of maintaining stent patency outweighed the risk of jonelle-procedural bleeding.  He will be called to schedule the ICD implantation and provide instructions regarding his insulin.    We recommend resuming the ACE inhibitor and beta blocker for his cardiomyopathy if there are no contraindications to either.  His BP and HR today would certainly tolerate ACEi & BB.    I appreciate the chance to help with Mr. Henao's care. Please let me know if you have any questions or concerns.  I discussed the patient with Dr. Price Patel.    Johnie Puente MD  Cardiovascular disease fellow

## 2017-10-17 ENCOUNTER — ANTICOAGULATION THERAPY VISIT (OUTPATIENT)
Dept: ANTICOAGULATION | Facility: CLINIC | Age: 52
End: 2017-10-17
Payer: COMMERCIAL

## 2017-10-17 LAB
INR POINT OF CARE: 2 (ref 0.86–1.14)
INTERPRETATION ECG - MUSE: NORMAL

## 2017-10-17 PROCEDURE — 36416 COLLJ CAPILLARY BLOOD SPEC: CPT

## 2017-10-17 PROCEDURE — 85610 PROTHROMBIN TIME: CPT | Mod: QW

## 2017-10-17 NOTE — PROGRESS NOTES
ANTICOAGULATION FOLLOW-UP CLINIC VISIT    Patient Name:  Luke Henao  Date:  10/17/2017  Contact Type:  Face to Face    SUBJECTIVE:     Patient Findings     Positives No Problem Findings           OBJECTIVE    INR Protime   Date Value Ref Range Status   10/17/2017 2.0 (A) 0.86 - 1.14 Final       ASSESSMENT / PLAN  INR assessment THER    Recheck INR In: 3 WEEKS    INR Location Clinic      Anticoagulation Summary as of 10/17/2017     INR goal 2.0-3.0   Today's INR 2.0   Maintenance plan 3 mg (3 mg x 1) every day   Full instructions 10/17: 4.5 mg; 10/18: 4.5 mg; Otherwise 3 mg every day   Weekly total 21 mg   Plan last modified Velvet Jones RN (10/3/2017)   Next INR check 10/31/2017   Target end date     Indications   Cerebrovascular accident (CVA) due to thrombosis of cerebral artery (H) (Resolved) [I63.30]  DVT (deep venous thrombosis) (H) [I82.409]  Long-term (current) use of anticoagulants [Z79.01] [Z79.01]         Anticoagulation Episode Summary     INR check location [facility]    Preferred lab     Send INR reminders to [facility]    Comments             See the Encounter Report to view Anticoagulation Flowsheet and Dosing Calendar (Go to Encounters tab in chart review, and find the Anticoagulation Therapy Visit)        Velvet Jones, RN

## 2017-10-17 NOTE — MR AVS SNAPSHOT
Javiertrish DEBBI Henao   10/17/2017 1:00 PM   Anticoagulation Therapy Visit    Description:  52 year old male   Provider:  [employer] [employer];  ANTICOAGULATION CLINIC   Department:   Anti Coagulation           INR as of 10/17/2017     Today's INR 2.0      Anticoagulation Summary as of 10/17/2017     INR goal 2.0-3.0   Today's INR 2.0   Full instructions 10/17: 4.5 mg; 10/18: 4.5 mg; Otherwise 3 mg every day   Next INR check 11/7/2017    Indications   Cerebrovascular accident (CVA) due to thrombosis of cerebral artery (H) (Resolved) [I63.30]  DVT (deep venous thrombosis) (H) [I82.409]  Long-term (current) use of anticoagulants [Z79.01] [Z79.01]         Your next Anticoagulation Clinic appointment(s)     Nov 07, 2017  1:15 PM CST   Anticoagulation Visit with  [facility] CLINIC   [facility]    [address] [place] [id number] [phone]              Contact Numbers     [facility]  Please call  [phone] to cancel and/or reschedule your appointment   Please call  [phone] with any problems or questions regarding your therapy.        October 2017 Details    Sun Mon Tue Wed Thu Fri Sat     1               2               3               4               5               6               7                 8               9               10               11               12               13               14                 15               16               17      4.5 mg   See details      18      4.5 mg         19      3 mg         20      3 mg         21      3 mg           22      3 mg         23      3 mg         24      3 mg         25      3 mg         26      3 mg         27      3 mg         28      3 mg           29      3 mg         30      3 mg         31      3 mg              Date Details   10/17 This INR check               How to take your warfarin dose     To take:  3 mg Take 1 of the 3 mg tablets.    To  take:  4.5 mg Take 1.5 of the 3 mg tablets.           November 2017 Details    Sun Mon Tue Wed Thu Fri Sat        1      3 mg         2      3 mg         3      3 mg         4      3 mg           5      3 mg         6      3 mg         7            8               9               10               11                 12               13               14               15               16               17               18                 19               20               21               22               23               24               25                 26               27               28               29               30                  Date Details   No additional details    Date of next INR:  11/7/2017         How to take your warfarin dose     To take:  3 mg Take 1 of the 3 mg tablets.

## 2017-10-20 ENCOUNTER — HOSPITAL ENCOUNTER (OUTPATIENT)
Dept: CARDIOLOGY | Facility: CLINIC | Age: 52
Discharge: HOME | End: 2017-10-20
Attending: INTERNAL MEDICINE | Admitting: INTERNAL MEDICINE
Payer: COMMERCIAL

## 2017-10-20 DIAGNOSIS — I25.5 ISCHEMIC CARDIOMYOPATHY: ICD-10-CM

## 2017-10-20 PROCEDURE — T1013 SIGN LANG/ORAL INTERPRETER: HCPCS | Mod: U3

## 2017-10-20 PROCEDURE — 93005 ELECTROCARDIOGRAM TRACING: CPT

## 2017-10-23 LAB — INTERPRETATION ECG - MUSE: NORMAL

## 2017-10-24 RX ORDER — ONDANSETRON 2 MG/ML
4 INJECTION INTRAMUSCULAR; INTRAVENOUS
Status: CANCELLED | OUTPATIENT
Start: 2017-10-24

## 2017-10-24 RX ORDER — LIDOCAINE 40 MG/G
CREAM TOPICAL
Status: CANCELLED | OUTPATIENT
Start: 2017-10-24

## 2017-10-27 ENCOUNTER — HOSPITAL ENCOUNTER (OUTPATIENT)
Facility: CLINIC | Age: 52
Discharge: HOME | End: 2017-10-28
Attending: INTERNAL MEDICINE | Admitting: INTERNAL MEDICINE
Payer: COMMERCIAL

## 2017-10-27 ENCOUNTER — APPOINTMENT (OUTPATIENT)
Dept: MEDSURG UNIT | Facility: CLINIC | Age: 52
End: 2017-10-27
Attending: INTERNAL MEDICINE
Payer: COMMERCIAL

## 2017-10-27 ENCOUNTER — APPOINTMENT (OUTPATIENT)
Dept: CARDIOLOGY | Facility: CLINIC | Age: 52
End: 2017-10-27
Attending: STUDENT IN AN ORGANIZED HEALTH CARE EDUCATION/TRAINING PROGRAM
Payer: COMMERCIAL

## 2017-10-27 ENCOUNTER — APPOINTMENT (OUTPATIENT)
Dept: GENERAL RADIOLOGY | Facility: CLINIC | Age: 52
End: 2017-10-27
Attending: INTERNAL MEDICINE
Payer: COMMERCIAL

## 2017-10-27 DIAGNOSIS — I25.5 ISCHEMIC CARDIOMYOPATHY: ICD-10-CM

## 2017-10-27 DIAGNOSIS — Z95.810 S/P ICD (INTERNAL CARDIAC DEFIBRILLATOR) PROCEDURE: Primary | ICD-10-CM

## 2017-10-27 PROBLEM — Z98.890 POST-OPERATIVE STATE: Status: ACTIVE | Noted: 2017-10-27

## 2017-10-27 LAB
ANION GAP SERPL CALCULATED.3IONS-SCNC: 8 MMOL/L (ref 3–14)
BUN SERPL-MCNC: 26 MG/DL (ref 7–30)
CALCIUM SERPL-MCNC: 9.6 MG/DL (ref 8.5–10.1)
CHLORIDE SERPL-SCNC: 108 MMOL/L (ref 94–109)
CO2 SERPL-SCNC: 25 MMOL/L (ref 20–32)
CREAT SERPL-MCNC: 1.19 MG/DL (ref 0.66–1.25)
ERYTHROCYTE [DISTWIDTH] IN BLOOD BY AUTOMATED COUNT: 13 % (ref 10–15)
GFR SERPL CREATININE-BSD FRML MDRD: 65 ML/MIN/1.7M2
GLUCOSE BLDC GLUCOMTR-MCNC: 185 MG/DL (ref 70–99)
GLUCOSE BLDC GLUCOMTR-MCNC: 99 MG/DL (ref 70–99)
GLUCOSE SERPL-MCNC: 136 MG/DL (ref 70–99)
HCT VFR BLD AUTO: 42.2 % (ref 40–53)
HGB BLD-MCNC: 13.5 G/DL (ref 13.3–17.7)
INR PPP: 2.37 (ref 0.86–1.14)
MCH RBC QN AUTO: 30.1 PG (ref 26.5–33)
MCHC RBC AUTO-ENTMCNC: 32 G/DL (ref 31.5–36.5)
MCV RBC AUTO: 94 FL (ref 78–100)
PLATELET # BLD AUTO: 236 10E9/L (ref 150–450)
POTASSIUM SERPL-SCNC: 4.2 MMOL/L (ref 3.4–5.3)
RBC # BLD AUTO: 4.48 10E12/L (ref 4.4–5.9)
SODIUM SERPL-SCNC: 140 MMOL/L (ref 133–144)
WBC # BLD AUTO: 11.2 10E9/L (ref 4–11)

## 2017-10-27 PROCEDURE — 82962 GLUCOSE BLOOD TEST: CPT

## 2017-10-27 PROCEDURE — 85610 PROTHROMBIN TIME: CPT | Performed by: STUDENT IN AN ORGANIZED HEALTH CARE EDUCATION/TRAINING PROGRAM

## 2017-10-27 PROCEDURE — 40000065 ZZH STATISTIC EKG NON-CHARGEABLE

## 2017-10-27 PROCEDURE — 27210795 ZZH PAD DEFIB QUICK CR4

## 2017-10-27 PROCEDURE — 27210784 ZZH KIT PACEMAKER CR8

## 2017-10-27 PROCEDURE — 27210807 ZZH SHEATH CR6

## 2017-10-27 PROCEDURE — 25000128 H RX IP 250 OP 636: Performed by: NURSE PRACTITIONER

## 2017-10-27 PROCEDURE — T1013 SIGN LANG/ORAL INTERPRETER: HCPCS | Mod: U3

## 2017-10-27 PROCEDURE — 33249 INSJ/RPLCMT DEFIB W/LEAD(S): CPT | Mod: Q0

## 2017-10-27 PROCEDURE — 80048 BASIC METABOLIC PNL TOTAL CA: CPT | Performed by: STUDENT IN AN ORGANIZED HEALTH CARE EDUCATION/TRAINING PROGRAM

## 2017-10-27 PROCEDURE — 40000172 ZZH STATISTIC PROCEDURE PREP ONLY

## 2017-10-27 PROCEDURE — 25000132 ZZH RX MED GY IP 250 OP 250 PS 637: Performed by: NURSE PRACTITIONER

## 2017-10-27 PROCEDURE — 99152 MOD SED SAME PHYS/QHP 5/>YRS: CPT | Performed by: INTERNAL MEDICINE

## 2017-10-27 PROCEDURE — 99152 MOD SED SAME PHYS/QHP 5/>YRS: CPT

## 2017-10-27 PROCEDURE — 25000128 H RX IP 250 OP 636: Performed by: STUDENT IN AN ORGANIZED HEALTH CARE EDUCATION/TRAINING PROGRAM

## 2017-10-27 PROCEDURE — C1722 AICD, SINGLE CHAMBER: HCPCS

## 2017-10-27 PROCEDURE — G0378 HOSPITAL OBSERVATION PER HR: HCPCS

## 2017-10-27 PROCEDURE — C1895 LEAD, AICD, ENDO DUAL COIL: HCPCS

## 2017-10-27 PROCEDURE — 33249 INSJ/RPLCMT DEFIB W/LEAD(S): CPT | Mod: Q0 | Performed by: INTERNAL MEDICINE

## 2017-10-27 PROCEDURE — 40000340 XR CHEST PORT 1 VW

## 2017-10-27 PROCEDURE — 93005 ELECTROCARDIOGRAM TRACING: CPT

## 2017-10-27 PROCEDURE — C1892 INTRO/SHEATH,FIXED,PEEL-AWAY: HCPCS

## 2017-10-27 PROCEDURE — 25000128 H RX IP 250 OP 636: Performed by: INTERNAL MEDICINE

## 2017-10-27 PROCEDURE — 25000132 ZZH RX MED GY IP 250 OP 250 PS 637: Performed by: INTERNAL MEDICINE

## 2017-10-27 PROCEDURE — 85027 COMPLETE CBC AUTOMATED: CPT | Performed by: STUDENT IN AN ORGANIZED HEALTH CARE EDUCATION/TRAINING PROGRAM

## 2017-10-27 PROCEDURE — 71010 XR CHEST PORT 1 VW: CPT

## 2017-10-27 PROCEDURE — 99153 MOD SED SAME PHYS/QHP EA: CPT

## 2017-10-27 RX ORDER — DIGOXIN 125 MCG
125 TABLET ORAL DAILY
Status: DISCONTINUED | OUTPATIENT
Start: 2017-10-28 | End: 2017-10-28 | Stop reason: HOSPADM

## 2017-10-27 RX ORDER — CEPHALEXIN 500 MG/1
500 TABLET ORAL 3 TIMES DAILY
Qty: 15 TABLET | Refills: 0 | Status: SHIPPED | OUTPATIENT
Start: 2017-10-27 | End: 2017-10-27

## 2017-10-27 RX ORDER — LIDOCAINE 40 MG/G
CREAM TOPICAL
Status: DISCONTINUED | OUTPATIENT
Start: 2017-10-27 | End: 2017-10-27

## 2017-10-27 RX ORDER — NALOXONE HYDROCHLORIDE 0.4 MG/ML
.1-.4 INJECTION, SOLUTION INTRAMUSCULAR; INTRAVENOUS; SUBCUTANEOUS
Status: DISCONTINUED | OUTPATIENT
Start: 2017-10-27 | End: 2017-10-27

## 2017-10-27 RX ORDER — LIDOCAINE 40 MG/G
CREAM TOPICAL
Status: DISCONTINUED | OUTPATIENT
Start: 2017-10-27 | End: 2017-10-27 | Stop reason: HOSPADM

## 2017-10-27 RX ORDER — NICOTINE POLACRILEX 4 MG
15-30 LOZENGE BUCCAL
Status: DISCONTINUED | OUTPATIENT
Start: 2017-10-27 | End: 2017-10-28 | Stop reason: HOSPADM

## 2017-10-27 RX ORDER — OXYCODONE AND ACETAMINOPHEN 5; 325 MG/1; MG/1
1 TABLET ORAL EVERY 4 HOURS PRN
Status: DISCONTINUED | OUTPATIENT
Start: 2017-10-27 | End: 2017-10-28 | Stop reason: HOSPADM

## 2017-10-27 RX ORDER — FENTANYL CITRATE 50 UG/ML
25-50 INJECTION, SOLUTION INTRAMUSCULAR; INTRAVENOUS
Status: ACTIVE | OUTPATIENT
Start: 2017-10-27 | End: 2017-10-28

## 2017-10-27 RX ORDER — ATORVASTATIN CALCIUM 40 MG/1
40 TABLET, FILM COATED ORAL DAILY
Status: DISCONTINUED | OUTPATIENT
Start: 2017-10-28 | End: 2017-10-28 | Stop reason: HOSPADM

## 2017-10-27 RX ORDER — WARFARIN SODIUM 5 MG/1
5 TABLET ORAL
Status: DISCONTINUED | OUTPATIENT
Start: 2017-10-27 | End: 2017-10-27 | Stop reason: CLARIF

## 2017-10-27 RX ORDER — ATROPINE SULFATE 0.1 MG/ML
0.5 INJECTION INTRAVENOUS EVERY 5 MIN PRN
Status: ACTIVE | OUTPATIENT
Start: 2017-10-27 | End: 2017-10-28

## 2017-10-27 RX ORDER — ACETAMINOPHEN 325 MG/1
650 TABLET ORAL EVERY 4 HOURS PRN
Status: DISCONTINUED | OUTPATIENT
Start: 2017-10-27 | End: 2017-10-28 | Stop reason: HOSPADM

## 2017-10-27 RX ORDER — PROTAMINE SULFATE 10 MG/ML
1-5 INJECTION, SOLUTION INTRAVENOUS
Status: DISCONTINUED | OUTPATIENT
Start: 2017-10-27 | End: 2017-10-27 | Stop reason: HOSPADM

## 2017-10-27 RX ORDER — LIDOCAINE 40 MG/G
CREAM TOPICAL
Status: DISCONTINUED | OUTPATIENT
Start: 2017-10-27 | End: 2017-10-28 | Stop reason: HOSPADM

## 2017-10-27 RX ORDER — CEFAZOLIN SODIUM 1 G/3ML
1 INJECTION, POWDER, FOR SOLUTION INTRAMUSCULAR; INTRAVENOUS EVERY 8 HOURS
Status: DISCONTINUED | OUTPATIENT
Start: 2017-10-27 | End: 2017-10-27

## 2017-10-27 RX ORDER — POTASSIUM CHLORIDE 750 MG/1
20 TABLET, EXTENDED RELEASE ORAL 2 TIMES DAILY
Status: DISCONTINUED | OUTPATIENT
Start: 2017-10-27 | End: 2017-10-28 | Stop reason: HOSPADM

## 2017-10-27 RX ORDER — PROMETHAZINE HYDROCHLORIDE 25 MG/ML
6.25-25 INJECTION, SOLUTION INTRAMUSCULAR; INTRAVENOUS EVERY 4 HOURS PRN
Status: DISCONTINUED | OUTPATIENT
Start: 2017-10-27 | End: 2017-10-27 | Stop reason: HOSPADM

## 2017-10-27 RX ORDER — NALOXONE HYDROCHLORIDE 0.4 MG/ML
0.4 INJECTION, SOLUTION INTRAMUSCULAR; INTRAVENOUS; SUBCUTANEOUS EVERY 5 MIN PRN
Status: DISCONTINUED | OUTPATIENT
Start: 2017-10-27 | End: 2017-10-27 | Stop reason: HOSPADM

## 2017-10-27 RX ORDER — NALOXONE HYDROCHLORIDE 0.4 MG/ML
.2-.4 INJECTION, SOLUTION INTRAMUSCULAR; INTRAVENOUS; SUBCUTANEOUS
Status: ACTIVE | OUTPATIENT
Start: 2017-10-27 | End: 2017-10-28

## 2017-10-27 RX ORDER — FUROSEMIDE 10 MG/ML
20-100 INJECTION INTRAMUSCULAR; INTRAVENOUS
Status: DISCONTINUED | OUTPATIENT
Start: 2017-10-27 | End: 2017-10-27 | Stop reason: HOSPADM

## 2017-10-27 RX ORDER — FENTANYL CITRATE 50 UG/ML
25-50 INJECTION, SOLUTION INTRAMUSCULAR; INTRAVENOUS
Status: DISCONTINUED | OUTPATIENT
Start: 2017-10-27 | End: 2017-10-27 | Stop reason: HOSPADM

## 2017-10-27 RX ORDER — CLOPIDOGREL BISULFATE 75 MG/1
75 TABLET ORAL DAILY
Status: DISCONTINUED | OUTPATIENT
Start: 2017-10-28 | End: 2017-10-27

## 2017-10-27 RX ORDER — LISINOPRIL 2.5 MG/1
2.5 TABLET ORAL DAILY
Status: DISCONTINUED | OUTPATIENT
Start: 2017-10-28 | End: 2017-10-28 | Stop reason: HOSPADM

## 2017-10-27 RX ORDER — CLOPIDOGREL BISULFATE 75 MG/1
75 TABLET ORAL DAILY
Status: DISCONTINUED | OUTPATIENT
Start: 2017-10-27 | End: 2017-10-28 | Stop reason: HOSPADM

## 2017-10-27 RX ORDER — PROTAMINE SULFATE 10 MG/ML
5-40 INJECTION, SOLUTION INTRAVENOUS EVERY 10 MIN PRN
Status: DISCONTINUED | OUTPATIENT
Start: 2017-10-27 | End: 2017-10-27 | Stop reason: HOSPADM

## 2017-10-27 RX ORDER — TAMSULOSIN HYDROCHLORIDE 0.4 MG/1
0.4 CAPSULE ORAL DAILY
Status: DISCONTINUED | OUTPATIENT
Start: 2017-10-28 | End: 2017-10-28 | Stop reason: HOSPADM

## 2017-10-27 RX ORDER — CEPHALEXIN 500 MG/1
500 TABLET ORAL 3 TIMES DAILY
Qty: 15 TABLET | Refills: 0 | Status: SHIPPED | OUTPATIENT
Start: 2017-10-27 | End: 2017-11-01

## 2017-10-27 RX ORDER — DOBUTAMINE HYDROCHLORIDE 200 MG/100ML
5-40 INJECTION INTRAVENOUS CONTINUOUS PRN
Status: DISCONTINUED | OUTPATIENT
Start: 2017-10-27 | End: 2017-10-27 | Stop reason: HOSPADM

## 2017-10-27 RX ORDER — ONDANSETRON 2 MG/ML
4 INJECTION INTRAMUSCULAR; INTRAVENOUS EVERY 4 HOURS PRN
Status: DISCONTINUED | OUTPATIENT
Start: 2017-10-27 | End: 2017-10-27 | Stop reason: HOSPADM

## 2017-10-27 RX ORDER — CEPHALEXIN 500 MG/1
500 TABLET ORAL 3 TIMES DAILY
Qty: 15 TABLET | Refills: 0 | Status: SHIPPED | OUTPATIENT
Start: 2017-10-27 | End: 2017-11-03

## 2017-10-27 RX ORDER — IOPAMIDOL 755 MG/ML
10 INJECTION, SOLUTION INTRAVASCULAR ONCE
Status: COMPLETED | OUTPATIENT
Start: 2017-10-27 | End: 2017-10-27

## 2017-10-27 RX ORDER — NALOXONE HYDROCHLORIDE 0.4 MG/ML
.1-.4 INJECTION, SOLUTION INTRAMUSCULAR; INTRAVENOUS; SUBCUTANEOUS
Status: DISCONTINUED | OUTPATIENT
Start: 2017-10-27 | End: 2017-10-28 | Stop reason: HOSPADM

## 2017-10-27 RX ORDER — FLUMAZENIL 0.1 MG/ML
0.2 INJECTION, SOLUTION INTRAVENOUS
Status: DISCONTINUED | OUTPATIENT
Start: 2017-10-27 | End: 2017-10-27 | Stop reason: HOSPADM

## 2017-10-27 RX ORDER — ADENOSINE 3 MG/ML
6-12 INJECTION, SOLUTION INTRAVENOUS EVERY 5 MIN PRN
Status: DISCONTINUED | OUTPATIENT
Start: 2017-10-27 | End: 2017-10-27 | Stop reason: HOSPADM

## 2017-10-27 RX ORDER — FLUMAZENIL 0.1 MG/ML
0.2 INJECTION, SOLUTION INTRAVENOUS
Status: ACTIVE | OUTPATIENT
Start: 2017-10-27 | End: 2017-10-28

## 2017-10-27 RX ORDER — CEFAZOLIN SODIUM 1 G/3ML
1 INJECTION, POWDER, FOR SOLUTION INTRAMUSCULAR; INTRAVENOUS EVERY 8 HOURS
Status: DISCONTINUED | OUTPATIENT
Start: 2017-10-27 | End: 2017-10-28 | Stop reason: HOSPADM

## 2017-10-27 RX ORDER — DEXTROSE MONOHYDRATE 25 G/50ML
25-50 INJECTION, SOLUTION INTRAVENOUS
Status: DISCONTINUED | OUTPATIENT
Start: 2017-10-27 | End: 2017-10-28 | Stop reason: HOSPADM

## 2017-10-27 RX ORDER — CEFAZOLIN SODIUM 2 G/100ML
2 INJECTION, SOLUTION INTRAVENOUS
Status: COMPLETED | OUTPATIENT
Start: 2017-10-27 | End: 2017-10-27

## 2017-10-27 RX ORDER — FINASTERIDE 5 MG/1
5 TABLET, FILM COATED ORAL DAILY
Status: DISCONTINUED | OUTPATIENT
Start: 2017-10-28 | End: 2017-10-28 | Stop reason: HOSPADM

## 2017-10-27 RX ORDER — ATROPINE SULFATE 0.1 MG/ML
.5-1 INJECTION INTRAVENOUS
Status: DISCONTINUED | OUTPATIENT
Start: 2017-10-27 | End: 2017-10-27 | Stop reason: HOSPADM

## 2017-10-27 RX ORDER — BUMETANIDE 0.5 MG/1
0.5 TABLET ORAL DAILY
Status: DISCONTINUED | OUTPATIENT
Start: 2017-10-28 | End: 2017-10-28 | Stop reason: HOSPADM

## 2017-10-27 RX ORDER — METOPROLOL SUCCINATE 25 MG/1
25 TABLET, EXTENDED RELEASE ORAL DAILY
Status: DISCONTINUED | OUTPATIENT
Start: 2017-10-28 | End: 2017-10-28 | Stop reason: HOSPADM

## 2017-10-27 RX ORDER — WARFARIN SODIUM 3 MG/1
3 TABLET ORAL
Status: COMPLETED | OUTPATIENT
Start: 2017-10-27 | End: 2017-10-27

## 2017-10-27 RX ORDER — OXYCODONE AND ACETAMINOPHEN 5; 325 MG/1; MG/1
1 TABLET ORAL EVERY 4 HOURS PRN
Status: DISCONTINUED | OUTPATIENT
Start: 2017-10-27 | End: 2017-10-27

## 2017-10-27 RX ORDER — LISINOPRIL 2.5 MG/1
2.5 TABLET ORAL DAILY
Qty: 30 TABLET | Refills: 1 | Status: SHIPPED | OUTPATIENT
Start: 2017-10-28 | End: 2017-11-08

## 2017-10-27 RX ORDER — OXYCODONE AND ACETAMINOPHEN 5; 325 MG/1; MG/1
1 TABLET ORAL EVERY 4 HOURS PRN
Qty: 28 TABLET | Refills: 0 | Status: SHIPPED | OUTPATIENT
Start: 2017-10-27 | End: 2017-12-22

## 2017-10-27 RX ORDER — LORAZEPAM 2 MG/ML
.5-2 INJECTION INTRAMUSCULAR EVERY 10 MIN PRN
Status: DISCONTINUED | OUTPATIENT
Start: 2017-10-27 | End: 2017-10-27 | Stop reason: HOSPADM

## 2017-10-27 RX ORDER — PANTOPRAZOLE SODIUM 40 MG/1
40 TABLET, DELAYED RELEASE ORAL DAILY
Status: DISCONTINUED | OUTPATIENT
Start: 2017-10-28 | End: 2017-10-28 | Stop reason: HOSPADM

## 2017-10-27 RX ORDER — BACLOFEN 10 MG/1
10 TABLET ORAL 3 TIMES DAILY
Status: DISCONTINUED | OUTPATIENT
Start: 2017-10-27 | End: 2017-10-28 | Stop reason: HOSPADM

## 2017-10-27 RX ORDER — DIPHENHYDRAMINE HYDROCHLORIDE 50 MG/ML
25-50 INJECTION INTRAMUSCULAR; INTRAVENOUS
Status: DISCONTINUED | OUTPATIENT
Start: 2017-10-27 | End: 2017-10-27 | Stop reason: HOSPADM

## 2017-10-27 RX ADMIN — CEFAZOLIN SODIUM 2 G: 2 INJECTION, SOLUTION INTRAVENOUS at 12:26

## 2017-10-27 RX ADMIN — WARFARIN SODIUM 3 MG: 3 TABLET ORAL at 17:34

## 2017-10-27 RX ADMIN — MIDAZOLAM HYDROCHLORIDE 1 MG: 1 INJECTION, SOLUTION INTRAMUSCULAR; INTRAVENOUS at 12:35

## 2017-10-27 RX ADMIN — CLOPIDOGREL 75 MG: 75 TABLET, FILM COATED ORAL at 22:43

## 2017-10-27 RX ADMIN — MIDAZOLAM HYDROCHLORIDE 1 MG: 1 INJECTION, SOLUTION INTRAMUSCULAR; INTRAVENOUS at 12:43

## 2017-10-27 RX ADMIN — POTASSIUM CHLORIDE 20 MEQ: 750 TABLET, EXTENDED RELEASE ORAL at 22:43

## 2017-10-27 RX ADMIN — OXYCODONE HYDROCHLORIDE AND ACETAMINOPHEN 1 TABLET: 5; 325 TABLET ORAL at 21:55

## 2017-10-27 RX ADMIN — FENTANYL CITRATE 50 MCG: 50 INJECTION, SOLUTION INTRAMUSCULAR; INTRAVENOUS at 12:35

## 2017-10-27 RX ADMIN — FENTANYL CITRATE 50 MCG: 50 INJECTION, SOLUTION INTRAMUSCULAR; INTRAVENOUS at 12:58

## 2017-10-27 RX ADMIN — OXYCODONE HYDROCHLORIDE AND ACETAMINOPHEN 1 TABLET: 5; 325 TABLET ORAL at 17:01

## 2017-10-27 RX ADMIN — CEFAZOLIN 1 G: 330 INJECTION, POWDER, FOR SOLUTION INTRAMUSCULAR; INTRAVENOUS at 21:55

## 2017-10-27 RX ADMIN — IOPAMIDOL 10 ML: 755 INJECTION, SOLUTION INTRAVASCULAR at 14:00

## 2017-10-27 RX ADMIN — BACLOFEN 10 MG: 10 TABLET ORAL at 22:00

## 2017-10-27 ASSESSMENT — PAIN DESCRIPTION - DESCRIPTORS: DESCRIPTORS: SORE

## 2017-10-27 NOTE — PROGRESS NOTES
Pt. AOX4.VSS. PT. Denies any pain. Pt. Tolerating fluids. Diet advanced to regular. Site Clean dry and intact

## 2017-10-27 NOTE — IP AVS SNAPSHOT
MRN:[id number]                      After Visit Summary   10/27/2017    Luke Henao    MRN: [id number]           Thank you!     Thank you for choosing [facility] for your care. Our goal is always to provide you with excellent care. Hearing back from our patients is one way we can continue to improve our services. Please take a few minutes to complete the written survey that you may receive in the mail after you visit with us. Thank you!        Patient Information     Date Of Birth          1965        About your hospital stay     You were admitted on:  October 27, 2017 You last received care in the:  Unit 6D Observation [facility]    You were discharged on:  October 28, 2017       Who to Call     For medical emergencies, please call 911.  For non-urgent questions about your medical care, please call your primary care provider or clinic, [phone]          Attending Provider     Provider Specialty    Price Patel MD Clinical Cardiac Electrophysiology       Primary Care Provider Office Phone # Fax #    Shanna Church [phone]      After Care Instructions     Discharge Instructions - Follow up with Device Check RN        Follow up with Device Check RN in 7-10 days.            Discharge Instructions - Keep incision dry for 72 hours       Keep incision dry for 72 hours (unless Derma Christian was applied)                  Your next 10 appointments already scheduled     Nov 03, 2017 10:30 AM CDT   (Arrive by 10:15 AM)   Implanted Defibulator with Uc Cv Device 1   [facility] ([facility])    [address]  [address] [facility] [place] [id number] [phone]            Nov 07, 2017  1:15 PM CST   Anticoagulation Visit with [facility]AG[facility]IC   [facility] ([facility]    [address] [id number] [phone]            Nov 30, 2017 10:30 AM CST   Lab  with [facility]    [facility]    [address]  [address] [facility] [address]   [phone]            Nov 30, 2017 11:00 AM CST   (Arrive by 10:45 AM)   CORE RETURN with Emily Collado NP   [facility] ([facility])    [address] [place] [address]   [phone]            Feb 26, 2018 11:00 AM CST   (Arrive by 10:45 AM)   Implanted Defibulator with Uc Cv Device 1   [facility] ([facility])    [address] [address]   [phone]            Feb 26, 2018 11:30 AM CST   (Arrive by 11:15 AM)   RETURN ARRHYTHMIA with RUBI Carreon CNP   [facility] ([facility])    [address] [place] [address]   [phone]              Future tests that were ordered for you     Basic metabolic panel                 Further instructions from your care team         Home Care after an ICD implant    Plan:    Start Lisinopril 2.5 mg daily    Take antibiotic for 5 days    Use pain pills as needed    Continue other home medications as prescribed     Have labs drawn in 1 week at device check on 11/3/17  Wound care:  Keep your incision (surgery wound) dry for 3 days.  After 3 days, you may remove the outer bandage.  Keep the strips of tape on.  They will be removed at your clinic visit.  Check for signs of infection each day.  These include increased redness, swelling, drainage or a fever over 101 F (38.3 C).  Call us immediately if you see any of these signs.  If there are no signs of infection, you may shower in 3 days.  Do not submerge the incision (in a bath tub, hot tub, or swimming pool) until fully healed.  Pain:   You may have mild to moderate pain for 3 to 5 days.  Take acetaminophen (Tylenol) or ibuprofen (Advil) for the pain.  Call us if the pain is severe or lasts more  than 5 days.    Activity:  You should slowly go back to your normal activities after 24 hours.  Healing will take 4 to 6 weeks.  No driving for 3 days  Avoid climbing a ladder alone.  It is best to stay within 4 feet of the ground.  Avoid anything that may cause rough contact or a hard hit to your chest.  This includes football, hockey, and other contact sports.  Do not go swimming or boating alone.      For at least 4 weeks:  Do not raise your affected arm above your shoulder.  Do not use your affected arm to push, pull, or lift anything over 10 pounds.  Avoid repetitive upper body activities for 6 weeks (ie: golf, swimming, and weight lifting)    Follow Up Visits:  Return to the clinic in 7 to 10 days to have your device and wound checked.    Telling others about your device:  Before you have any medical tests or treatments, tell the doctors, dentists, and other care providers about your device.  There are a few tests and treatments that may interfere with your device.  (These include MRI, radiation therapy, electrocautery, and others.)  Your care team may need to take special steps to keep you safe.  Before you leave the hospital, you will receive a temporary ID card.  A permanent card will be mailed to you about 6 to 8 weeks later.  Always carry the ID card with you.  It has important details about your device.  You should also get a [employer] ID.  Please ask us for a [employer] brochure, or go to [email or web address].    Safety near electrical equipment:  All of these are safe to use when in good repair:    Microwaves    Radios    Cordless phone    Remote controls    Small electrical tools  Cell phones: Keep cell phones at least 6 inches from your device.  Do not carry the phone in a pocket near your device.  Security negrete: It is okay to walk through security negrete at the airports and department stores.  Tell airport security that you have a defibrillator.  They should keep the screening wand at least 6 inches  from your device.  Full-body scanners are safe.    Avoid the following:    MRI tests in the hospital unless you have a MRI safe defibrillator.    Arc welding, chain saws and high-powered industrial or commercial tools.    Power lines, power plants and large power generators.    Electric body fat scales.    Magnetic mattress pads or pillow.    What to do after a shock from your ICD:  1. Stop what you re doing and rest.  2. If you feel fine before and after the shock, call the device clinic.  3. If you feel unwell or receive more than one shock, call 911 or go to the emergency room.  A shock could mean that your condition has changed and you may need to see a doctor.    Questions?  Please call [facility]    Device Nurse:          Business Hours:  [phone]    After Hours:  [phone]   Choose option 4, then ask for the  on-call device nurse at job code [id number].    Your next device clinic appointment is scheduled on:     ________11/3/17___________________ at _10:30___________.                                                 [facility]  [facility] - [address] [address]        Pending Results     Date and Time Order Name Status Description    10/27/2017 1408 EKG 12-lead, tracing only Preliminary     10/27/2017 1105 EKG 12-lead, tracing only Preliminary             Statement of Approval     Ordered          10/28/17 1114  I have reviewed and agree with all the recommendations and orders detailed in this document.  EFFECTIVE NOW     Approved and electronically signed by:  Eric Sandoval MD             Admission Information     Date & Time Provider Department Dept. Phone    10/27/2017 Price Patel MD Unit 6D Observation [facility] [phone]      Your Vitals Were     Blood Pressure Pulse Temperature Respirations Pulse Oximetry       103/77 105 98.5  F (36.9  C) (Oral) 16 95%       MyChart Information     MyChart  "lets you send messages to your doctor, view your test results, renew your prescriptions, schedule appointments and more. To sign up, go to [email or web address] . Click on \"Log in\" on the left side of the screen, which will take you to the Welcome page. Then click on \"Sign up Now\" on the right side of the page.     You will be asked to enter the access code listed below, as well as some personal information. Please follow the directions to create your username and password.     Your access code is: [email or web address]  Expires: 2017  6:30 AM     Your access code will  in 90 days. If you need help or a new code, please call your [place] clinic or [phone].        Care EveryWhere ID     This is your Care EveryWhere ID. This could be used by other organizations to access your [place] medical records  [id number]        Equal Access to Services     DALTON AYALA : Enedelia Justin, mirlande paniagua, aris kinney, karen nettles . So [facility] [phone].    ATENCIÓN: Si habla español, tiene a suarez disposición servicios gratuitos de asistencia lingüística. Llame al [phone].    We comply with applicable federal civil rights laws and [place] laws. We do not discriminate on the basis of race, color, national origin, age, disability, sex, sexual orientation, or gender identity.               Review of your medicines      START taking        Dose / Directions    * cephalexin 500 MG tablet   Used for:  S/P ICD (internal cardiac defibrillator) procedure        Dose:  500 mg   Take 500 mg by mouth 3 times daily for 5 days   Quantity:  15 tablet   Refills:  0       * cephalexin 500 MG tablet   Used for:  S/P ICD (internal cardiac defibrillator) procedure        Dose:  500 mg   Take 500 mg by mouth 3 times daily   Quantity:  15 tablet   Refills:  0       lisinopril 2.5 MG tablet   Commonly known as:  PRINIVIL/Zestril   Used for:  Ischemic cardiomyopathy        Dose: "  2.5 mg   Take 1 tablet (2.5 mg) by mouth daily   Quantity:  30 tablet   Refills:  1       oxyCODONE-acetaminophen 5-325 MG per tablet   Commonly known as:  PERCOCET   Used for:  S/P ICD (internal cardiac defibrillator) procedure        Dose:  1 tablet   Take 1 tablet by mouth every 4 hours as needed for other (mild or moderate pain)   Quantity:  28 tablet   Refills:  0       * Notice:  This list has 2 medication(s) that are the same as other medications prescribed for you. Read the directions carefully, and ask your doctor or other care provider to review them with you.      CONTINUE these medicines which have NOT CHANGED        Dose / Directions    acetaminophen 500 MG tablet   Commonly known as:  TYLENOL   Used for:  Lower extremity pain, bilateral, Shakiness        Dose:  1000 mg   Take 2 tablets (1,000 mg) by mouth every 6 hours as needed for mild pain   Quantity:  30 tablet   Refills:  0       alcohol swab prep pads        Use to swab area of injection/mary alice as directed 3 x per day   Quantity:  100 each   Refills:  11       ascorbic acid 500 MG Tabs   Used for:  Coronary artery disease involving native coronary artery of native heart without angina pectoris        Dose:  500 mg   Take 1 tablet (500 mg) by mouth daily   Quantity:  2 tablet   Refills:  0       ASPIRIN NOT PRESCRIBED   Commonly known as:  INTENTIONAL   Used for:  Cardiogenic shock (H), Type 2 diabetes mellitus without complication, with long-term current use of insulin (H)        Please choose reason not prescribed, below   Quantity:  0 each   Refills:  0       atorvastatin 40 MG tablet   Commonly known as:  LIPITOR   Used for:  Coronary artery disease involving native coronary artery of native heart without angina pectoris, Cardiogenic shock (H)        Dose:  40 mg   1 tablet (40 mg) by Oral or Feeding Tube route daily   Quantity:  60 tablet   Refills:  7       baclofen 10 MG tablet   Commonly known as:  LIORESAL   Used for:  Lower limb symptom         Dose:  10 mg   Take 1 tablet (10 mg) by mouth 3 times daily   Quantity:  90 tablet   Refills:  3       beta carotene 35939 UNIT capsule   Used for:  Ischemic cardiomyopathy        Dose:  82808 Units   Take 1 capsule (25,000 Units) by mouth daily   Quantity:  2 capsule   Refills:  0       blood glucose monitoring test strip   Commonly known as:  no brand specified   Used for:  Diabetes mellitus type 2, insulin dependent (H)        Use to test blood sugar 3 times daily or as directed.   Quantity:  100 strip   Refills:  0       bumetanide 0.5 MG tablet   Commonly known as:  BUMEX   Used for:  Ischemic cardiomyopathy        Dose:  0.5 mg   Take 1 tablet (0.5 mg) by mouth daily   Quantity:  120 tablet   Refills:  0       cetirizine 10 MG tablet   Commonly known as:  zyrTEC   Used for:  Seasonal allergic rhinitis        Dose:  10 mg   Take 1 tablet (10 mg) by mouth daily   Quantity:  60 tablet   Refills:  11       clopidogrel 75 MG tablet   Commonly known as:  PLAVIX   Used for:  ST elevation myocardial infarction (STEMI), unspecified artery (H)        Dose:  75 mg   Take 1 tablet (75 mg) by mouth daily   Quantity:  60 tablet   Refills:  11       digoxin 125 MCG tablet   Commonly known as:  LANOXIN   Used for:  Coronary artery disease involving native coronary artery of native heart without angina pectoris, Cardiogenic shock (H)        Dose:  125 mcg   Take 1 tablet (125 mcg) by mouth daily   Quantity:  60 tablet   Refills:  5       finasteride 5 MG tablet   Commonly known as:  PROSCAR   Used for:  Hypertrophy of prostate with urinary obstruction        Dose:  5 mg   Take 1 tablet (5 mg) by mouth daily   Quantity:  60 tablet   Refills:  11       insulin glargine 100 UNIT/ML injection   Commonly known as:  LANTUS        Dose:  25 Units   Inject 25 Units Subcutaneous every morning (before breakfast)   Quantity:  18 mL   Refills:  3       insulin pen needle 32G X 4 MM   Commonly known as:  BD KYLEE U/F   Used for:   Diabetes mellitus type 2, insulin dependent (H)        Use 3 daily or as directed.   Quantity:  100 each   Refills:  prn       METOPROLOL SUCCINATE ER PO        Dose:  25 mg   Take 25 mg by mouth daily   Refills:  0       pantoprazole 40 MG EC tablet   Commonly known as:  PROTONIX   Used for:  Gastrointestinal hemorrhage associated with other gastritis        Dose:  40 mg   Take 1 tablet (40 mg) by mouth daily   Quantity:  60 tablet   Refills:  11       potassium chloride SA 20 MEQ CR tablet   Commonly known as:  K-DUR/KLOR-CON M   Used for:  Ischemic cardiomyopathy        Dose:  20 mEq   Take 1 tablet (20 mEq) by mouth 2 times daily   Quantity:  120 tablet   Refills:  11       Sharps Container Misc   Used for:  Diabetes mellitus type 2, insulin dependent (H)        Dose:  1 each   1 each every 30 days   Quantity:  1 each   Refills:  0       tamsulosin 0.4 MG capsule   Commonly known as:  FLOMAX   Used for:  Ischemic cardiomyopathy        Dose:  0.4 mg   Take 1 capsule (0.4 mg) by mouth daily   Quantity:  60 capsule   Refills:  0       * Warfarin Therapy Reminder   Used for:  Deep vein thrombosis (DVT) of left lower extremity, unspecified chronicity, unspecified vein (H)        Dose:  1 each   1 each continuous prn   Refills:  0       * warfarin 3 MG tablet   Commonly known as:  COUMADIN   Used for:  Deep vein thrombosis (DVT) of left lower extremity, unspecified chronicity, unspecified vein (H)        Take one tablet daily as directed by the ACC   Quantity:  100 tablet   Refills:  0       zinc sulfate 220 (50 ZN) MG capsule   Commonly known as:  ZINCATE   Used for:  Ischemic cardiomyopathy        Dose:  220 mg   Take 1 capsule (220 mg) by mouth daily   Quantity:  2 capsule   Refills:  0       * Notice:  This list has 2 medication(s) that are the same as other medications prescribed for you. Read the directions carefully, and ask your doctor or other care provider to review them with you.         Where to get your  medicines      Some of these will need a paper prescription and others can be bought over the counter. Ask your nurse if you have questions.     Bring a paper prescription for each of these medications     cephalexin 500 MG tablet    cephalexin 500 MG tablet    lisinopril 2.5 MG tablet    oxyCODONE-acetaminophen 5-325 MG per tablet               ANTIBIOTIC INSTRUCTION     You've Been Prescribed an Antibiotic - Now What?  Your healthcare team thinks that you or your loved one might have an infection. Some infections can be treated with antibiotics, which are powerful, life-saving drugs. Like all medications, antibiotics have side effects and should only be used when necessary. There are some important things you should know about your antibiotic treatment.      Your healthcare team may run tests before you start taking an antibiotic.    Your team may take samples (e.g., from your blood, urine or other areas) to run tests to look for bacteria. These test can be important to determine if you need an antibiotic at all and, if you do, which antibiotic will work best.      Within a few days, your healthcare team might change or even stop your antibiotic.    Your team may start you on an antibiotic while they are working to find out what is making you sick.    Your team might change your antibiotic because test results show that a different antibiotic would be better to treat your infection.    In some cases, once your team has more information, they learn that you do not need an antibiotic at all. They may find out that you don't have an infection, or that the antibiotic you're taking won't work against your infection. For example, an infection caused by a virus can't be treated with antibiotics. Staying on an antibiotic when you don't need it is more likely to be harmful than helpful.      You may experience side effects from your antibiotic.    Like all medications, antibiotics have side effects. Some of these can be  serious.    Let you healthcare team know if you have any known allergies when you are admitted to the hospital.    One significant side effect of nearly all antibiotics is the risk of severe and sometimes deadly diarrhea caused by Clostridium difficile (C. Difficile). This occurs when a person takes antibiotics because some good germs are destroyed. Antibiotic use allows C. diificile to take over, putting patients at high risk for this serious infection.    As a patient or caregiver, it is important to understand your or your loved one's antibiotic treatment. It is especially important for caregivers to speak up when patients can't speak for themselves. Here are some important questions to ask your healthcare team.    What infection is this antibiotic treating and how do you know I have that infection?    What side effects might occur from this antibiotic?    How long will I need to take this antibiotic?    Is it safe to take this antibiotic with other medications or supplements (e.g., vitamins) that I am taking?     Are there any special directions I need to know about taking this antibiotic? For example, should I take it with food?    How will I be monitored to know whether my infection is responding to the antibiotic?    What tests may help to make sure the right antibiotic is prescribed for me?      Information provided by:  www.cdc.gov/getsmart  U.S. Department of Health and Human Services  Centers for disease Control and Prevention  National Center for Emerging and Zoonotic Infectious Diseases  Division of Healthcare Quality Promotion         Protect others around you: Learn how to safely use, store and throw away your medicines at www.disposemymeds.org.             Medication List: This is a list of all your medications and when to take them. Check marks below indicate your daily home schedule. Keep this list as a reference.      Medications           Morning Afternoon Evening Bedtime As Needed     acetaminophen 500 MG tablet   Commonly known as:  TYLENOL   Take 2 tablets (1,000 mg) by mouth every 6 hours as needed for mild pain                                alcohol swab prep pads   Use to swab area of injection/mary alice as directed 3 x per day                                ascorbic acid 500 MG Tabs   Take 1 tablet (500 mg) by mouth daily                                ASPIRIN NOT PRESCRIBED   Commonly known as:  INTENTIONAL   Please choose reason not prescribed, below                                atorvastatin 40 MG tablet   Commonly known as:  LIPITOR   1 tablet (40 mg) by Oral or Feeding Tube route daily   Last time this was given:  40 mg on 10/28/2017 10:18 AM                                baclofen 10 MG tablet   Commonly known as:  LIORESAL   Take 1 tablet (10 mg) by mouth 3 times daily   Last time this was given:  10 mg on 10/28/2017 10:18 AM                                beta carotene 48796 UNIT capsule   Take 1 capsule (25,000 Units) by mouth daily                                blood glucose monitoring test strip   Commonly known as:  no brand specified   Use to test blood sugar 3 times daily or as directed.                                bumetanide 0.5 MG tablet   Commonly known as:  BUMEX   Take 1 tablet (0.5 mg) by mouth daily   Last time this was given:  0.5 mg on 10/28/2017 10:17 AM                                * cephalexin 500 MG tablet   Take 500 mg by mouth 3 times daily for 5 days                                * cephalexin 500 MG tablet   Take 500 mg by mouth 3 times daily                                cetirizine 10 MG tablet   Commonly known as:  zyrTEC   Take 1 tablet (10 mg) by mouth daily                                clopidogrel 75 MG tablet   Commonly known as:  PLAVIX   Take 1 tablet (75 mg) by mouth daily   Last time this was given:  75 mg on 10/27/2017 10:43 PM                                digoxin 125 MCG tablet   Commonly known as:  LANOXIN   Take 1 tablet (125 mcg) by  mouth daily   Last time this was given:  125 mcg on 10/28/2017 10:17 AM                                finasteride 5 MG tablet   Commonly known as:  PROSCAR   Take 1 tablet (5 mg) by mouth daily   Last time this was given:  5 mg on 10/28/2017 10:16 AM                                insulin glargine 100 UNIT/ML injection   Commonly known as:  LANTUS   Inject 25 Units Subcutaneous every morning (before breakfast)   Last time this was given:  25 Units on 10/28/2017 10:21 AM                                insulin pen needle 32G X 4 MM   Commonly known as:  BD KYLEE U/F   Use 3 daily or as directed.                                lisinopril 2.5 MG tablet   Commonly known as:  PRINIVIL/Zestril   Take 1 tablet (2.5 mg) by mouth daily   Last time this was given:  2.5 mg on 10/28/2017 10:18 AM                                METOPROLOL SUCCINATE ER PO   Take 25 mg by mouth daily   Last time this was given:  25 mg on 10/28/2017 10:17 AM                                oxyCODONE-acetaminophen 5-325 MG per tablet   Commonly known as:  PERCOCET   Take 1 tablet by mouth every 4 hours as needed for other (mild or moderate pain)   Last time this was given:  1 tablet on 10/28/2017 11:00 AM                                pantoprazole 40 MG EC tablet   Commonly known as:  PROTONIX   Take 1 tablet (40 mg) by mouth daily   Last time this was given:  40 mg on 10/28/2017 10:17 AM                                potassium chloride SA 20 MEQ CR tablet   Commonly known as:  K-DUR/KLOR-CON M   Take 1 tablet (20 mEq) by mouth 2 times daily   Last time this was given:  20 mEq on 10/28/2017 10:18 AM                                Sharps Container Misc   1 each every 30 days                                tamsulosin 0.4 MG capsule   Commonly known as:  FLOMAX   Take 1 capsule (0.4 mg) by mouth daily   Last time this was given:  0.4 mg on 10/28/2017 10:16 AM                                * Warfarin Therapy Reminder   1 each continuous prn                                 * warfarin 3 MG tablet   Commonly known as:  COUMADIN   Take one tablet daily as directed by the ACC   Last time this was given:  3 mg on 10/27/2017  5:34 PM                                zinc sulfate 220 (50 ZN) MG capsule   Commonly known as:  ZINCATE   Take 1 capsule (220 mg) by mouth daily                                * Notice:  This list has 4 medication(s) that are the same as other medications prescribed for you. Read the directions carefully, and ask your doctor or other care provider to review them with you.

## 2017-10-27 NOTE — IP AVS SNAPSHOT
Unit [address]D Observation [address] [address]    Phone:  [phone]    Fax:  [phone]                                       After Visit Summary   10/27/2017    Luke Henao    MRN: [id number]           After Visit Summary Signature Page     I have received my discharge instructions, and my questions have been answered. I have discussed any challenges I see with this plan with the nurse or doctor.    ..........................................................................................................................................  Patient/Patient Representative Signature      ..........................................................................................................................................  Patient Representative Print Name and Relationship to Patient    ..................................................               ................................................  Date                                            Time    ..........................................................................................................................................  Reviewed by Signature/Title    ...................................................              ..............................................  Date                                                            Time

## 2017-10-27 NOTE — PROCEDURES
EP PROCEDURE NOTE    Procedures:  1. Single chamber ICD device implantation.  2. Left subclavian venogram      Attending: Price Patel MD  EP Fellow: Fabian Wolff MD  Procedure Date: 10/27/2017    Pre-operative Diagnosis:  CHF due of ICM, EF=32%, NYHA class II, optimal medical therapy > 3 months   Post-operative diagnosis:   Successful implantation of ICD, primary prevention of SCD.  Complications: None.     Fluoroscopy time/dose: 1.9 minutes, 24 cGycm2.      Clinical Profile:  52 year-old with ischemic cardiomyopathy, Class II heart failure and EF 32% despite > 90 days of optimal medical therapy. His on full oral anticoagulation with warfarin as well as clopidogrel.     PROCEDURE  The risks and benefits of the procedure were explained to the patient in full.  The risks of the procedure include, but are not limited to: pain, bleeding, blood transfusion reactions, infection, pneumothorax, perforation of vessels or heart, pericardial effusion, and death. Informed Consent was obtained. The patient was brought to the EP lab in a fasting and hemodynamically stable condition. The patient was prepped and draped in a sterile fashion.   Sedation: This procedure was performed under moderate sedation under the supervision of the the Staff Physician. The patient received 2 mg Versed and 100 mcg Fentanyl for a total procedural sedation time of 55 minutes. Heart rate, blood pressure, oxygen saturation, and patient responses were monitored throughout the procedure with the assistance of the RN.     The upper chest wall was vigorously washed, prepped, and sterilely draped. The chest wall was anesthetized with 2% lidocaine. A subclavian venogram was performed to use as a guide for venous access given patient's anticoagulation status. The venogram was reviewed and demonstrate patent axillary, cephalic and subclavian veins. A still frame was saved and used as a guide for access.    A 5 cm incision was made approximately 2  fingerbreadths from the clavicle. The subcutaneous tissue was carefully dissected down to the pectoral fascia. The dissection was carried inferiorly to form a subcutaneous pocket with adequate hemostasis provided by electrocautery. The subclavian vein was accessed via the pocket using a micropuncture technique and over the first rib under fluoroscopic guidance.    A peel away sheath was inserted through the lateral guidewire. The guidewire and dilator was removed. A right ventricular lead was inserted through the sheath down to the RV apical septum and was screwed into the myocardium. There was very good sensing and pacing threshold at this position. Ten volt Pacing was performed without diaphragmatic stimulation. The sheath was then peeled away, and the sleeve adapter was advanced over the lead. The lead was then secured to the muscle using 0-Ethibond suture.     The pocket was then vigorously washed with antibiotic solution and examined carefully for hemostasis. Any discrete bleeding sites were cauterized. There was a small amount of oozing noted. The right ventricular lead was inserted into the pulse generator. The pulse generator and the lead were inserted into the subcutaneous pocket and secured with a 0-Ethibond suture.    The pocket was then closed in 3 layers using 2-0 Vicryl for the deep layers and 4-0 Vicryl for the subcuticular layer. Steri-Strips and a dressing were then applied over the incision site, and the patient was transported to a monitored bed.    Dr. Patel was present throughout the entire procedure.    EQUIPMENT  1.The Pulse Generator is a  KELLEY [employer] MRI, Serial [id number].   2.The RV Lead is a  KELLEY [id number], Serial [id number].     MEASUREMENTS  The RV lead is sensing R waves of 8.6 mV and a pacing threshold of 0.5 V @ 0.4 msec with an impedance of 437 ohms.     FINAL PROGRAMMING  Sebastián Settings:  -Pacing mode: VVI.  -Lower Rate Limit: 40 ppm.    Tachy Settings:  -VT Zone: set at 171 bpm,  Therapy: monitor.  -VF zone: set at 200 bpm, 30 out of 40, Therapy: 35J x 6.    PLAN  1. Wound care instruction will be given and reviewed with  prior to discharge  2. Antibiotics: Ancef     3. CXR and Device interrogation in a.m.  4. Given the need for warfarin and clopidogrel, a pressure dressing will be needed. He will be admit to observation status to observe for adequacy of hemostasis and hematoma formation.

## 2017-10-27 NOTE — PHARMACY-ANTICOAGULATION SERVICE
Clinical Pharmacy - Warfarin Dosing Consult     Pharmacy has been consulted to manage this patient s warfarin therapy.  Warfarin Prior to Admission: Yes  Warfarin PTA Regimen: warfarin 3mg daily    INR   Date Value Ref Range Status   10/27/2017 2.37 (H) 0.86 - 1.14 Final     INR Protime   Date Value Ref Range Status   10/17/2017 2.0 (A) 0.86 - 1.14 Final       Recommend warfarin 5 mg today.  Pharmacy will monitor Bong V Henao daily and order warfarin doses to achieve specified goal.      Please contact pharmacy as soon as possible if the warfarin needs to be held for a procedure or if the warfarin goals change.      Airam Falcon, PharmD

## 2017-10-28 ENCOUNTER — APPOINTMENT (OUTPATIENT)
Dept: GENERAL RADIOLOGY | Facility: CLINIC | Age: 52
End: 2017-10-28
Attending: NURSE PRACTITIONER
Payer: COMMERCIAL

## 2017-10-28 VITALS
HEART RATE: 105 BPM | DIASTOLIC BLOOD PRESSURE: 77 MMHG | TEMPERATURE: 98.5 F | OXYGEN SATURATION: 95 % | RESPIRATION RATE: 16 BRPM | SYSTOLIC BLOOD PRESSURE: 103 MMHG

## 2017-10-28 PROBLEM — Z95.810 ICD (IMPLANTABLE CARDIOVERTER-DEFIBRILLATOR) IN PLACE: Status: ACTIVE | Noted: 2017-10-28

## 2017-10-28 LAB
GLUCOSE BLDC GLUCOMTR-MCNC: 133 MG/DL (ref 70–99)
INR PPP: 2.4 (ref 0.86–1.14)

## 2017-10-28 PROCEDURE — 25000128 H RX IP 250 OP 636: Performed by: NURSE PRACTITIONER

## 2017-10-28 PROCEDURE — 85610 PROTHROMBIN TIME: CPT | Performed by: NURSE PRACTITIONER

## 2017-10-28 PROCEDURE — 82962 GLUCOSE BLOOD TEST: CPT

## 2017-10-28 PROCEDURE — 25000132 ZZH RX MED GY IP 250 OP 250 PS 637: Performed by: INTERNAL MEDICINE

## 2017-10-28 PROCEDURE — S0138 FINASTERIDE, 5 MG: HCPCS | Performed by: NURSE PRACTITIONER

## 2017-10-28 PROCEDURE — 25000132 ZZH RX MED GY IP 250 OP 250 PS 637: Performed by: NURSE PRACTITIONER

## 2017-10-28 PROCEDURE — 71020 XR CHEST 2 VW: CPT

## 2017-10-28 PROCEDURE — 36415 COLL VENOUS BLD VENIPUNCTURE: CPT | Performed by: NURSE PRACTITIONER

## 2017-10-28 PROCEDURE — G0378 HOSPITAL OBSERVATION PER HR: HCPCS

## 2017-10-28 PROCEDURE — 25000131 ZZH RX MED GY IP 250 OP 636 PS 637: Performed by: NURSE PRACTITIONER

## 2017-10-28 RX ORDER — WARFARIN SODIUM 3 MG/1
3 TABLET ORAL
Status: DISCONTINUED | OUTPATIENT
Start: 2017-10-28 | End: 2017-10-28 | Stop reason: HOSPADM

## 2017-10-28 RX ADMIN — METOPROLOL SUCCINATE 25 MG: 25 TABLET, EXTENDED RELEASE ORAL at 10:17

## 2017-10-28 RX ADMIN — FINASTERIDE 5 MG: 5 TABLET, FILM COATED ORAL at 10:16

## 2017-10-28 RX ADMIN — DIGOXIN 125 MCG: 125 TABLET ORAL at 10:17

## 2017-10-28 RX ADMIN — PANTOPRAZOLE SODIUM 40 MG: 40 TABLET, DELAYED RELEASE ORAL at 10:17

## 2017-10-28 RX ADMIN — OXYCODONE HYDROCHLORIDE AND ACETAMINOPHEN 1 TABLET: 5; 325 TABLET ORAL at 03:18

## 2017-10-28 RX ADMIN — CEFAZOLIN 1 G: 330 INJECTION, POWDER, FOR SOLUTION INTRAMUSCULAR; INTRAVENOUS at 06:20

## 2017-10-28 RX ADMIN — BUMETANIDE 0.5 MG: 0.5 TABLET ORAL at 10:17

## 2017-10-28 RX ADMIN — POTASSIUM CHLORIDE 20 MEQ: 750 TABLET, EXTENDED RELEASE ORAL at 10:18

## 2017-10-28 RX ADMIN — ATORVASTATIN CALCIUM 40 MG: 40 TABLET, FILM COATED ORAL at 10:18

## 2017-10-28 RX ADMIN — TAMSULOSIN HYDROCHLORIDE 0.4 MG: 0.4 CAPSULE ORAL at 10:16

## 2017-10-28 RX ADMIN — INSULIN GLARGINE 25 UNITS: 100 INJECTION, SOLUTION SUBCUTANEOUS at 10:21

## 2017-10-28 RX ADMIN — OXYCODONE HYDROCHLORIDE AND ACETAMINOPHEN 1 TABLET: 5; 325 TABLET ORAL at 11:00

## 2017-10-28 RX ADMIN — BACLOFEN 10 MG: 10 TABLET ORAL at 10:18

## 2017-10-28 RX ADMIN — LISINOPRIL 2.5 MG: 2.5 TABLET ORAL at 10:18

## 2017-10-28 NOTE — DISCHARGE INSTRUCTIONS
Home Care after an ICD implant    Plan:    Start Lisinopril 2.5 mg daily    Take antibiotic for 5 days    Use pain pills as needed    Continue other home medications as prescribed     Have labs drawn in 1 week at device check on 11/3/17  Wound care:  Keep your incision (surgery wound) dry for 3 days.  After 3 days, you may remove the outer bandage.  Keep the strips of tape on.  They will be removed at your clinic visit.  Check for signs of infection each day.  These include increased redness, swelling, drainage or a fever over 101 F (38.3 C).  Call us immediately if you see any of these signs.  If there are no signs of infection, you may shower in 3 days.  Do not submerge the incision (in a bath tub, hot tub, or swimming pool) until fully healed.  Pain:   You may have mild to moderate pain for 3 to 5 days.  Take acetaminophen (Tylenol) or ibuprofen (Advil) for the pain.  Call us if the pain is severe or lasts more than 5 days.    Activity:  You should slowly go back to your normal activities after 24 hours.  Healing will take 4 to 6 weeks.  No driving for 3 days  Avoid climbing a ladder alone.  It is best to stay within 4 feet of the ground.  Avoid anything that may cause rough contact or a hard hit to your chest.  This includes football, hockey, and other contact sports.  Do not go swimming or boating alone.      For at least 4 weeks:  Do not raise your affected arm above your shoulder.  Do not use your affected arm to push, pull, or lift anything over 10 pounds.  Avoid repetitive upper body activities for 6 weeks (ie: golf, swimming, and weight lifting)    Follow Up Visits:  Return to the clinic in 7 to 10 days to have your device and wound checked.    Telling others about your device:  Before you have any medical tests or treatments, tell the doctors, dentists, and other care providers about your device.  There are a few tests and treatments that may interfere with your device.  (These include MRI, radiation  therapy, electrocautery, and others.)  Your care team may need to take special steps to keep you safe.  Before you leave the hospital, you will receive a temporary ID card.  A permanent card will be mailed to you about 6 to 8 weeks later.  Always carry the ID card with you.  It has important details about your device.  You should also get a MedicAlert ID.  Please ask us for a MedicAlert brochure, or go to [email or web address].    Safety near electrical equipment:  All of these are safe to use when in good repair:    Microwaves    Radios    Cordless phone    Remote controls    Small electrical tools  Cell phones: Keep cell phones at least 6 inches from your device.  Do not carry the phone in a pocket near your device.  Security negrete: It is okay to walk through security negrete at the airports and department stores.  Tell airport security that you have a defibrillator.  They should keep the screening wand at least 6 inches from your device.  Full-body scanners are safe.    Avoid the following:    MRI tests in the hospital unless you have a MRI safe defibrillator.    Arc welding, chain saws and high-powered industrial or commercial tools.    Power lines, power plants and large power generators.    Electric body fat scales.    Magnetic mattress pads or pillow.    What to do after a shock from your ICD:  1. Stop what you re doing and rest.  2. If you feel fine before and after the shock, call the device clinic.  3. If you feel unwell or receive more than one shock, call 911 or go to the emergency room.  A shock could mean that your condition has changed and you may need to see a doctor.    Questions?  Please call [facility]    Device Nurse:          Business Hours:  [phone]    After Hours:  [phone]   Choose option 4, then ask for the  on-call device nurse at job code [phone].    Your next device clinic appointment is scheduled on:     ________11/3/17___________________ at _10:30___________.                                                  [facility] [address]  [address] [place]  [address]

## 2017-10-28 NOTE — DISCHARGE SUMMARY
Electrophysiology Discharge Summary  Date of Procedure: 10/26/17  DOS: 10/27/2017   Pre-operative Diagnosis:  CHF due of ICM, EF=32%, NYHA class II, optimal medical therapy > 3 months   Post-operative diagnosis:   Successful implantation of ICD, primary prevention of SCD.  Hospital Course:  Mr. Henao is a 52 year old male who has a past medical history significant for tobacco use, DM, and CAD s/p PCI. He presented to [facility] 03/26/17 with RCA STEMI transferred to [facility] after POBA, for further workup and management of cardiogenic shock, which led to a 6-week hospital stay  (3/29/17-5/12/17), revascularization with  DESx7 to RCA (culprit), LCx (, now closed) and LAD on 3/27, with refractory cardiogenic shock requiring intraaortic balloon pump, eventually subclavian balloon pump, multiple episodes of PEDRO, sepsis, sacral ulcer, DVT on a/c, b/l hemispheric infarcts likely due to watershed ischemia,  eventually refractory CS was attributed to ischemic MR on iCM (EF25-30%), and pt was treated with percutaneous MV repair with MitraClip (5/1/17), which led to reduction in MR from severe to mild, and substantial clinic improvement, weaning of IABP, and eventual discharge to rehabilitation, where pt stayed from 5/12/17 -5/26/17. It appears that he was on lisinopril up until a visit with his primary care physician Dr. Church on 9/26/2017, at which time it was discontinued due to history of PEDRO.  Nonetheless, his LVEF has not recovered from 30-35% in 3/2017 despite treatment with ACE inhibition and beta blockade following revascularization. Decision was made to pursue ICD.   Patient underwent a successful ICD implant as primary prevention of SCD yesterday. He had an uneventful overnight stay. Device interrogation shows normal device function and stable lead parameters. Chest x-ray demonstrates no evidence of pneumothorax. Left subclavian site is CDI, no bleeding, and a small stable  size hematoma. Pressure dressing was removed. Patient is tolerating oral intake, ambulating at baseline, and voiding without difficulties. Patient remains hemodynamically stable. Scr has been stable, will slowly reintroduce ACEI with low dose and plan for BMP in 1 week.     ROS:   10 point ROS neg other than the symptoms noted above.   Exam:  /89  Pulse 105  Temp 98.4  F (36.9  C) (Oral)  Resp 16  SpO2 97%    Constitutional: healthy, alert and no distress  Head: Normocephalic. No masses, lesions, tenderness or abnormalities  Neck: Neck supple. No adenopathy. Thyroid symmetric, normal size,, Carotids without bruits.  ENT: ENT exam normal, no neck nodes or sinus tenderness  Cardiovascular: negative, PMI normal. No lifts, heaves, or thrills. RRR. No murmurs, clicks gallops or rub  Respiratory: negative, Percussion normal. Good diaphragmatic excursion. Lungs clear  Gastrointestinal: Abdomen soft, non-tender. BS normal. No masses, organomegaly  : Deferred  Musculoskeletal: extremities normal- no gross deformities noted, gait normal and normal muscle tone  Skin: hematoma around the device, pressure dressing removed, hematoma stable.  Neurologic: Gait normal. Reflexes normal and symmetric. Sensation grossly WNL.  Psychiatric: mentation appears normal and affect normal/bright    Discharge Medications:   Luke Henao   Home Medication Instructions NATA:[id number]    Printed on:10/27/17 2129   Medication Information                      acetaminophen (TYLENOL) 500 MG tablet  Take 2 tablets (1,000 mg) by mouth every 6 hours as needed for mild pain             alcohol swab prep pads  Use to swab area of injection/mary alice as directed 3 x per day             ascorbic acid 500 MG TABS  Take 1 tablet (500 mg) by mouth daily             ASPIRIN NOT PRESCRIBED (INTENTIONAL)  Please choose reason not prescribed, below             atorvastatin (LIPITOR) 40 MG tablet  1 tablet (40 mg) by Oral or Feeding Tube route daily              baclofen (LIORESAL) 10 MG tablet  Take 1 tablet (10 mg) by mouth 3 times daily             beta carotene 84213 UNIT capsule  Take 1 capsule (25,000 Units) by mouth daily             blood glucose monitoring (NO BRAND SPECIFIED) test strip  Use to test blood sugar 3 times daily or as directed.             bumetanide (BUMEX) 0.5 MG tablet  Take 1 tablet (0.5 mg) by mouth daily             cephalexin 500 MG tablet  Take 500 mg by mouth 3 times daily for 5 days             cephalexin 500 MG tablet  Take 500 mg by mouth 3 times daily             cetirizine (ZYRTEC) 10 MG tablet  Take 1 tablet (10 mg) by mouth daily             clopidogrel (PLAVIX) 75 MG tablet  Take 1 tablet (75 mg) by mouth daily             digoxin (LANOXIN) 125 MCG tablet  Take 1 tablet (125 mcg) by mouth daily             finasteride (PROSCAR) 5 MG tablet  Take 1 tablet (5 mg) by mouth daily             insulin glargine (LANTUS) 100 UNIT/ML injection  Inject 25 Units Subcutaneous every morning (before breakfast)             insulin pen needle (BD KYLEE U/F) 32G X 4 MM  Use 3 daily or as directed.             lisinopril (PRINIVIL/ZESTRIL) 2.5 MG tablet  Take 1 tablet (2.5 mg) by mouth daily             METOPROLOL SUCCINATE ER PO  Take 25 mg by mouth daily             oxyCODONE-acetaminophen (PERCOCET) 5-325 MG per tablet  Take 1 tablet by mouth every 4 hours as needed for other (mild or moderate pain)             pantoprazole (PROTONIX) 40 MG EC tablet  Take 1 tablet (40 mg) by mouth daily             potassium chloride SA (K-DUR/KLOR-CON M) 20 MEQ CR tablet  Take 1 tablet (20 mEq) by mouth 2 times daily             Sharps Container MISC  1 each every 30 days             tamsulosin (FLOMAX) 0.4 MG capsule  Take 1 capsule (0.4 mg) by mouth daily             warfarin (COUMADIN) 3 MG tablet  Take one tablet daily as directed by the [facility]             Warfarin Therapy Reminder  1 each continuous prn             zinc sulfate (ZINCATE) 220 (50 ZN) MG  capsule  Take 1 capsule (220 mg) by mouth daily                 Plan & Follow up:    Start Lisinopril 2.5 mg daily    Continue other home medications without changes    BMP at device clinic follow up in 7-10 days    Follow up with device clinic in 7-10 days    Oral antibiotics x 5 days    Keep incision clean and dry for 72 hours post procedure    No lifting > 10lbs or over shoulder level with left arm for 4 weeks    Notify device clinic/EP immediately for any redness, swelling, bleeding, discharge, fevers or chills.    Advised checking INR and coumadin clinic on Monday for close monitoring of his INR given he is at risk of pocket bleeding with coumadin and that INR might be affected by antibiotics transiently.    Discharge to home.     Eric Sandoval MD FACProMedica Flower HospitalRS  Cardiology - Electrophysiology

## 2017-10-28 NOTE — PLAN OF CARE
Problem: Patient Care Overview  Goal: Discharge Needs Assessment  VSS. NSR. A&Ox4. L chest drsg CDI. Instructed to keep drsg on and dry for 3 days. Educated on L arm precautions- pt demonstrating understanding. Reviewed medications with pt and daughter. Follow up appointments and numbers to call if any questions or issues arise. Pt and daughter verbalize understanding. Daughter providing ride home.

## 2017-10-28 NOTE — PROGRESS NOTES
Patient s/p ICD/pacemaker implant day 1. Pressure dressing remains in place to left chest incision. No hematoma noted at this time. Patient complains of pain to incision site; receiving PO percocet q4h PRN with good relief. Patient is tolerating a regular diet. Patient is voiding spontaneously. Patient continues to receive IV antibiotics as ordered. Patient ambulating with stand by assist. Patient remains on tele; HR , SR/ST. BG monitored per orders; see flowsheets for results. Daughter at bedside. Plan for chest x-ray and device interrogation today.

## 2017-10-30 ENCOUNTER — ANTICOAGULATION THERAPY VISIT (OUTPATIENT)
Dept: ANTICOAGULATION | Facility: CLINIC | Age: 52
End: 2017-10-30
Payer: COMMERCIAL

## 2017-10-30 ENCOUNTER — TELEPHONE (OUTPATIENT)
Dept: CARDIOLOGY | Facility: CLINIC | Age: 52
End: 2017-10-30

## 2017-10-30 DIAGNOSIS — Z79.01 LONG-TERM (CURRENT) USE OF ANTICOAGULANTS: ICD-10-CM

## 2017-10-30 LAB
INR POINT OF CARE: 3.1 (ref 0.86–1.14)
INTERPRETATION ECG - MUSE: NORMAL
INTERPRETATION ECG - MUSE: NORMAL

## 2017-10-30 PROCEDURE — 85610 PROTHROMBIN TIME: CPT | Mod: QW

## 2017-10-30 PROCEDURE — 36416 COLLJ CAPILLARY BLOOD SPEC: CPT

## 2017-10-30 NOTE — PROGRESS NOTES
ANTICOAGULATION FOLLOW-UP CLINIC VISIT    Patient Name:  Luke Henao  Date:  10/30/2017  Contact Type:  Face to Face    SUBJECTIVE:     Patient Findings     Positives Antibiotic use or infection (pt had an ICD inserted on 10/26 and is on cephalexin x 5 days )           OBJECTIVE    INR Protime   Date Value Ref Range Status   10/30/2017 3.1 (A) 0.86 - 1.14 Final       ASSESSMENT / PLAN  INR assessment THER    Recheck INR In: 1 WEEK    INR Location Clinic      Anticoagulation Summary as of 10/30/2017     INR goal 2.0-3.0   Today's INR 3.1!   Maintenance plan 3 mg (3 mg x 1) every day   Full instructions 10/30: 1.5 mg; Otherwise 3 mg every day   Weekly total 21 mg   Plan last modified Velvet Jones RN (10/3/2017)   Next INR check 11/6/2017   Target end date     Indications   Cerebrovascular accident (CVA) due to thrombosis of cerebral artery (H) (Resolved) [I63.30]  DVT (deep venous thrombosis) (H) [I82.409]  Long-term (current) use of anticoagulants [Z79.01] [Z79.01]         Anticoagulation Episode Summary     INR check location Coumadin Clinic    Preferred lab     Send INR reminders to  [facility]    Comments             See the Encounter Report to view Anticoagulation Flowsheet and Dosing Calendar (Go to Encounters tab in chart review, and find the Anticoagulation Therapy Visit)    Dosage adjustment made based on physician directed care plan.  Pt offered  phone due to  not being available, patient declined.  He was able to understand information during visit and was able to repeat back to writer with no difficulties.      Pina Coyle RN

## 2017-10-30 NOTE — TELEPHONE ENCOUNTER
Nathaniel Plummer, called in for the patient to report that he has not been able to urinate since his ICD was placed 10/27/17.  She reports that he has only been able to go a little each day, and he is in severe pain.  I recommended she bring him to the ED for evaluation.  She agrees with the plan and the pt agrees to come.

## 2017-10-30 NOTE — MR AVS SNAPSHOT
Javiertrish DEBBI Henao   10/30/2017 9:45 AM   Anticoagulation Therapy Visit    Description:  52 year old male   Provider:  PHONE, ;  ANTICOAGULATION CLINIC   Department:   Anti Coagulation           INR as of 10/30/2017     Today's INR 3.1!      Anticoagulation Summary as of 10/30/2017     INR goal 2.0-3.0   Today's INR 3.1!   Full instructions 10/30: 1.5 mg; Otherwise 3 mg every day   Next INR check 11/6/2017    Indications   Cerebrovascular accident (CVA) due to thrombosis of cerebral artery (H) (Resolved) [I63.30]  DVT (deep venous thrombosis) (H) [I82.409]  Long-term (current) use of anticoagulants [Z79.01] [Z79.01]         Your next Anticoagulation Clinic appointment(s)     Nov 06, 2017 10:00 AM CST   Anticoagulation Visit with  ANTICOAGULATION CLINIC   [facility]    [address] [address] [phone]              Contact Numbers     [facility]  Please call  [phone] to cancel and/or reschedule your appointment   Please call  [phone] with any problems or questions regarding your therapy.        October 2017 Details    Sun Mon Tue Wed Thu Fri Sat     1               2               3               4               5               6               7                 8               9               10               11               12               13               14                 15               16               17               18               19               20               21                 22               23               24               25               26               27               28                 29               30      1.5 mg   See details      31      3 mg              Date Details   10/30 This INR check               How to take your warfarin dose     To take:  1.5 mg Take 0.5 of a 3 mg tablet.    To take:  3 mg Take 1 of the 3 mg tablets.           November 2017 Details    Sun  Mon Tue Wed Thu Fri Sat        1      3 mg         2      3 mg         3      3 mg         4      3 mg           5      3 mg         6            7               8               9               10               11                 12               13               14               15               16               17               18                 19               20               21               22               23               24               25                 26               27               28               29               30                  Date Details   No additional details    Date of next INR:  11/6/2017         How to take your warfarin dose     To take:  3 mg Take 1 of the 3 mg tablets.

## 2017-11-03 ENCOUNTER — ALLIED HEALTH/NURSE VISIT (OUTPATIENT)
Dept: CARDIOLOGY | Facility: CLINIC | Age: 52
End: 2017-11-03
Payer: COMMERCIAL

## 2017-11-03 DIAGNOSIS — Z95.810 S/P ICD (INTERNAL CARDIAC DEFIBRILLATOR) PROCEDURE: ICD-10-CM

## 2017-11-03 DIAGNOSIS — I25.5 ISCHEMIC CARDIOMYOPATHY: Primary | ICD-10-CM

## 2017-11-03 DIAGNOSIS — I25.5 ISCHEMIC CARDIOMYOPATHY: ICD-10-CM

## 2017-11-03 LAB
ANION GAP SERPL CALCULATED.3IONS-SCNC: 8 MMOL/L (ref 3–14)
BUN SERPL-MCNC: 26 MG/DL (ref 7–30)
CALCIUM SERPL-MCNC: 8.8 MG/DL (ref 8.5–10.1)
CHLORIDE SERPL-SCNC: 104 MMOL/L (ref 94–109)
CO2 SERPL-SCNC: 26 MMOL/L (ref 20–32)
CREAT SERPL-MCNC: 1.11 MG/DL (ref 0.66–1.25)
GFR SERPL CREATININE-BSD FRML MDRD: 70 ML/MIN/1.7M2
GLUCOSE SERPL-MCNC: 152 MG/DL (ref 70–99)
INR PPP: 2.36 (ref 0.86–1.14)
POTASSIUM SERPL-SCNC: 4.2 MMOL/L (ref 3.4–5.3)
SODIUM SERPL-SCNC: 137 MMOL/L (ref 133–144)

## 2017-11-03 PROCEDURE — 93282 PRGRMG EVAL IMPLANTABLE DFB: CPT | Mod: ZF

## 2017-11-03 PROCEDURE — 85610 PROTHROMBIN TIME: CPT | Performed by: INTERNAL MEDICINE

## 2017-11-03 PROCEDURE — 93282 PRGRMG EVAL IMPLANTABLE DFB: CPT | Mod: 26 | Performed by: INTERNAL MEDICINE

## 2017-11-03 PROCEDURE — 80048 BASIC METABOLIC PNL TOTAL CA: CPT | Performed by: NURSE PRACTITIONER

## 2017-11-03 PROCEDURE — 36415 COLL VENOUS BLD VENIPUNCTURE: CPT | Performed by: INTERNAL MEDICINE

## 2017-11-03 RX ORDER — CEPHALEXIN 500 MG/1
500 TABLET ORAL 3 TIMES DAILY
Qty: 21 TABLET | Refills: 0 | Status: SHIPPED | OUTPATIENT
Start: 2017-11-03 | End: 2017-11-10

## 2017-11-03 NOTE — PROGRESS NOTES
Pt seen in clinic for evaluation and iterative programming of his Medtronic dual chamber ICD per MD order.  He looks well but he states that his ICD site is sore and he also has a rash.  His ICD site has a large hematoma, 12 x 14 x 3 cm, it is tender to the touch and the area is firm.  There is no redness or drainage.  Bertha Rich NP was called and we will plan to extend his antibiotics, draw an INR today since his INR was 3.1 on 10/30/17, and have him RTC on 11/8/17 for another incision check.  The rash he discovered after his implant and it was isolated to the skin areas that were exposed to the chloroprep and he and his wife report that it is much better than it was.  His ICD check looks great, no episodes of ventricular arrhythmias have been recorded, he is RV Pacing <0.1% of the time, his heart rate histograms show good heart rate variation, his optivol is still establishing a baseline and his ICD battery estimates 11.4 years left.  Normal ICD function.    Single ICD

## 2017-11-03 NOTE — MR AVS SNAPSHOT
After Visit Summary   11/3/2017    Luke Henao    MRN: [id number]           Patient Information     Date Of Birth          1965        Visit Information        Provider Department      11/3/2017 10:15 AM 1, Uc Cv Device; [place] [employer] [facility]        Today's Diagnoses     Ischemic cardiomyopathy    -  1       Follow-ups after your visit        Follow-up notes from your care team     Discussed this visit Return in about 1 month (around 12/3/2017) for [employer], ICD check.      Your next 10 appointments already scheduled     Nov 03, 2017 11:45 AM CDT   Lab with [facility]in [facility] LAB    [facility] ([facility])    [address] [place] [address]   [phone]            Nov 06, 2017  9:45 AM CST   Anticoagulation Visit with [facility]AGULATION CLINIC   [facility] ([facility])    [address] [address]   [phone]            Nov 08, 2017 11:00 AM CST   (Arrive by 10:45 AM)   RETURN ARRHYTHMIA with RUBI Quiroz CNP   [facility])    [address] [place] [address]   [phone]            Nov 30, 2017 10:00 AM CST   Lab with  LAB   [facility] ([facility])    [address] [place] [address]   [phone]            Nov 30, 2017 10:30 AM CST   (Arrive by 10:15 AM)   Implanted Defibulator with Uc Cv Device 1   [facility] ([facility])    [address] [place] [address]   [phone]            Nov 30, 2017 11:00 AM CST   (Arrive by 10:45 AM)   CORE RETURN with Emily Collado NP   [facility] ([facility])    [address] [place] [address]   [phone]            Feb 26, 2018 11:00 AM CST   (Arrive by  "10:45 AM)   Implanted Defibulator with Uc Cv Device 1   [facility] ([facility])    [address] [facility]  [address] [facility] [address] [phone]            2018 11:30 AM CST   (Arrive by 11:15 AM)   RETURN ARRHYTHMIA with RUBI Carreon CNP   [facility] ([facility])    [address] [address] [phone]              Who to contact     If you have questions or need follow up information about today's clinic visit or your schedule please contact [facility] directly at [phone].  Normal or non-critical lab and imaging results will be communicated to you by [employer], letter or phone within 4 business days after the clinic has received the results. If you do not hear from us within 7 days, please contact the clinic through [employer] or phone. If you have a critical or abnormal lab result, we will notify you by phone as soon as possible.  Submit refill requests through [employer] or call your pharmacy and they will forward the refill request to us. Please allow 3 business days for your refill to be completed.          Additional Information About Your Visit        [employer] Information     [employer] lets you send messages to your doctor, view your test results, renew your prescriptions, schedule appointments and more. To sign up, go to [email or web address] . Click on \"Log in\" on the left side of the screen, which will take you to the Welcome page. Then click on \"Sign up Now\" on the right side of the page.     You will be asked to enter the access code listed below, as well as some personal information. Please follow the directions to create your username and password.     Your access code is: [email or web address]  Expires: 2017  6:30 AM     Your access code will  in 90 days. If you need help or a new code, please call your [place] clinic or [phone].        Care " EveryWhere ID     This is your Care EveryWhere ID. This could be used by other organizations to access your [place] medical records  [id number]         Blood Pressure from Last 3 Encounters:   10/28/17 103/77   10/16/17 130/90   09/26/17 114/84    Weight from Last 3 Encounters:   10/16/17 54.7 kg (120 lb 11.2 oz)   10/10/17 54.9 kg (121 lb 1.6 oz)   09/26/17 55.6 kg (122 lb 9.6 oz)              We Performed the Following     ICD DEVICE PROGRAMMING EVAL, SINGLE LEAD ICD     INR          Where to get your medicines      These medications were sent to [facility]/pharmacy #[id number] - [facility] [address] [address]     Phone:  [phone]     cephalexin 500 MG tablet          Primary Care Provider Office Phone # Fax #    Shanna Church [phone]       [address] [address]        Equal Access to Services     DALTON AYALA : Hadii aad ku hadasho Soomaali, waaxda luqadaha, qaybta kaalmada adeegyada, waxay idiin haynathann shabbir nettles . So [facility] [phone].    ATENCIÓN: Si habla español, tiene a suarez disposición servicios gratuitos de asistencia lingüística. Llame al [phone].    We comply with applicable federal civil rights laws and [place] laws. We do not discriminate on the basis of race, color, national origin, age, disability, sex, sexual orientation, or gender identity.            Thank you!     Thank you for choosing [facility]  for your care. Our goal is always to provide you with excellent care. Hearing back from our patients is one way we can continue to improve our services. Please take a few minutes to complete the written survey that you may receive in the mail after your visit with us. Thank you!             Your Updated Medication List - Protect others around you: Learn how to safely use, store and throw away your medicines at www.disposemymeds.org.          This list is accurate as of: 11/3/17 10:44 AM.  Always  use your most recent med list.                   Brand Name Dispense Instructions for use Diagnosis    acetaminophen 500 MG tablet    TYLENOL    30 tablet    Take 2 tablets (1,000 mg) by mouth every 6 hours as needed for mild pain    Lower extremity pain, bilateral, Shakiness       alcohol swab prep pads     100 each    Use to swab area of injection/mary alice as directed 3 x per day        ascorbic acid 500 MG Tabs     2 tablet    Take 1 tablet (500 mg) by mouth daily    Coronary artery disease involving native coronary artery of native heart without angina pectoris       ASPIRIN NOT PRESCRIBED    INTENTIONAL    0 each    Please choose reason not prescribed, below    Cardiogenic shock (H), Type 2 diabetes mellitus without complication, with long-term current use of insulin (H)       atorvastatin 40 MG tablet    LIPITOR    60 tablet    1 tablet (40 mg) by Oral or Feeding Tube route daily    Coronary artery disease involving native coronary artery of native heart without angina pectoris, Cardiogenic shock (H)       baclofen 10 MG tablet    LIORESAL    90 tablet    Take 1 tablet (10 mg) by mouth 3 times daily    Lower limb symptom       beta carotene 21805 UNIT capsule     2 capsule    Take 1 capsule (25,000 Units) by mouth daily    Ischemic cardiomyopathy       blood glucose monitoring test strip    no brand specified    100 strip    Use to test blood sugar 3 times daily or as directed.    Diabetes mellitus type 2, insulin dependent (H)       bumetanide 0.5 MG tablet    BUMEX    120 tablet    Take 1 tablet (0.5 mg) by mouth daily    Ischemic cardiomyopathy       cephalexin 500 MG tablet     21 tablet    Take 500 mg by mouth 3 times daily for 7 days    S/P ICD (internal cardiac defibrillator) procedure       cetirizine 10 MG tablet    zyrTEC    60 tablet    Take 1 tablet (10 mg) by mouth daily    Seasonal allergic rhinitis       clopidogrel 75 MG tablet    PLAVIX    60 tablet    Take 1 tablet (75 mg) by mouth daily    ST  elevation myocardial infarction (STEMI), unspecified artery (H)       digoxin 125 MCG tablet    LANOXIN    60 tablet    Take 1 tablet (125 mcg) by mouth daily    Coronary artery disease involving native coronary artery of native heart without angina pectoris, Cardiogenic shock (H)       finasteride 5 MG tablet    PROSCAR    60 tablet    Take 1 tablet (5 mg) by mouth daily    Hypertrophy of prostate with urinary obstruction       insulin glargine 100 UNIT/ML injection    LANTUS    18 mL    Inject 25 Units Subcutaneous every morning (before breakfast)        insulin pen needle 32G X 4 MM    BD KYLEE U/F    100 each    Use 3 daily or as directed.    Diabetes mellitus type 2, insulin dependent (H)       lisinopril 2.5 MG tablet    PRINIVIL/Zestril    30 tablet    Take 1 tablet (2.5 mg) by mouth daily    Ischemic cardiomyopathy       METOPROLOL SUCCINATE ER PO      Take 25 mg by mouth daily        oxyCODONE-acetaminophen 5-325 MG per tablet    PERCOCET    28 tablet    Take 1 tablet by mouth every 4 hours as needed for other (mild or moderate pain)    S/P ICD (internal cardiac defibrillator) procedure       pantoprazole 40 MG EC tablet    PROTONIX    60 tablet    Take 1 tablet (40 mg) by mouth daily    Gastrointestinal hemorrhage associated with other gastritis       potassium chloride SA 20 MEQ CR tablet    K-DUR/KLOR-CON M    120 tablet    Take 1 tablet (20 mEq) by mouth 2 times daily    Ischemic cardiomyopathy       Sharps Container Misc     1 each    1 each every 30 days    Diabetes mellitus type 2, insulin dependent (H)       tamsulosin 0.4 MG capsule    FLOMAX    60 capsule    Take 1 capsule (0.4 mg) by mouth daily    Ischemic cardiomyopathy       * Warfarin Therapy Reminder      1 each continuous prn    Deep vein thrombosis (DVT) of left lower extremity, unspecified chronicity, unspecified vein (H)       * warfarin 3 MG tablet    COUMADIN    100 tablet    Take one tablet daily as directed by the ACC    Deep vein  thrombosis (DVT) of left lower extremity, unspecified chronicity, unspecified vein (H)       zinc sulfate 220 (50 ZN) MG capsule    ZINCATE    2 capsule    Take 1 capsule (220 mg) by mouth daily    Ischemic cardiomyopathy       * Notice:  This list has 2 medication(s) that are the same as other medications prescribed for you. Read the directions carefully, and ask your doctor or other care provider to review them with you.

## 2017-11-05 ENCOUNTER — OFFICE VISIT (OUTPATIENT)
Dept: URGENT CARE | Facility: URGENT CARE | Age: 52
End: 2017-11-05
Payer: COMMERCIAL

## 2017-11-05 ENCOUNTER — RADIANT APPOINTMENT (OUTPATIENT)
Dept: GENERAL RADIOLOGY | Facility: CLINIC | Age: 52
End: 2017-11-05
Attending: FAMILY MEDICINE
Payer: COMMERCIAL

## 2017-11-05 VITALS
DIASTOLIC BLOOD PRESSURE: 80 MMHG | RESPIRATION RATE: 18 BRPM | TEMPERATURE: 98.3 F | BODY MASS INDEX: 21.67 KG/M2 | WEIGHT: 118.5 LBS | HEART RATE: 103 BPM | OXYGEN SATURATION: 100 % | SYSTOLIC BLOOD PRESSURE: 100 MMHG

## 2017-11-05 DIAGNOSIS — Z95.0 STATUS POST PLACEMENT OF CARDIAC PACEMAKER: ICD-10-CM

## 2017-11-05 DIAGNOSIS — Z95.0 STATUS POST PLACEMENT OF CARDIAC PACEMAKER: Primary | ICD-10-CM

## 2017-11-05 DIAGNOSIS — T81.31XA DISRUPTION OF EXTERNAL SURGICAL WOUND, INITIAL ENCOUNTER: ICD-10-CM

## 2017-11-05 PROCEDURE — 99213 OFFICE O/P EST LOW 20 MIN: CPT | Performed by: FAMILY MEDICINE

## 2017-11-05 PROCEDURE — 71020 XR CHEST 2 VW: CPT

## 2017-11-05 NOTE — PROGRESS NOTES
SUBJECTIVE:   Chief Complaint   Patient presents with     Urgent Care     Pt states heart surgery, feel like theres some watery feeling and where the surgery was done it looks purple, some pain  x last night      Luke Henao is a 52 year old male who presents for  A wound check of a   incision  And surgery site where he had a pacemaker implanted  on the  left, superior chest.  The procedure was performed  7 days ago   No fevers or chills,  No shortness of breath  He has noticed bruising of the skin over the site of the pacemaker   Since the procedure there has been a few drops of bloody drainage from one end of  the wound.  Pain and tenderness is improved.  He is taking Tylenol with codeine    Past Medical History:   Diagnosis Date     CAD (coronary artery disease) 2017    RCA STEMI s/p balloon angioplasty and multiple Sofie to RCA, LCx, and LAD     DVT (deep venous thrombosis) (H) 2017    left internal jugular (line-associated); left common femoral; on coumadin     Ischemic cardiomyopathy 2017    EF 30-35%     Ischemic stroke (H) 2017    no residual deficits     Lower GI bleed 2017    due to rectal ulcer     Mitral valve insufficiency, ischemic 2017    s/p Mitral Clip placement      Type 2 diabetes mellitus (H)        ALLERGIES:  Review of patient's allergies indicates no known allergies.      Current Outpatient Prescriptions on File Prior to Visit:  cephalexin 500 MG tablet Take 500 mg by mouth 3 times daily for 7 days   METOPROLOL SUCCINATE ER PO Take 25 mg by mouth daily   oxyCODONE-acetaminophen (PERCOCET) 5-325 MG per tablet Take 1 tablet by mouth every 4 hours as needed for other (mild or moderate pain)   lisinopril (PRINIVIL/ZESTRIL) 2.5 MG tablet Take 1 tablet (2.5 mg) by mouth daily   warfarin (COUMADIN) 3 MG tablet Take one tablet daily as directed by the ACC   blood glucose monitoring (NO BRAND SPECIFIED) test strip Use to test blood sugar 3 times daily or as directed.   digoxin (LANOXIN) 125 MCG tablet  Take 1 tablet (125 mcg) by mouth daily   insulin pen needle (BD KYLEE U/F) 32G X 4 MM Use 3 daily or as directed.   bumetanide (BUMEX) 0.5 MG tablet Take 1 tablet (0.5 mg) by mouth daily   alcohol swab prep pads Use to swab area of injection/mary alice as directed 3 x per day   insulin glargine (LANTUS) 100 UNIT/ML injection Inject 25 Units Subcutaneous every morning (before breakfast)   atorvastatin (LIPITOR) 40 MG tablet 1 tablet (40 mg) by Oral or Feeding Tube route daily   cetirizine (ZYRTEC) 10 MG tablet Take 1 tablet (10 mg) by mouth daily   clopidogrel (PLAVIX) 75 MG tablet Take 1 tablet (75 mg) by mouth daily   finasteride (PROSCAR) 5 MG tablet Take 1 tablet (5 mg) by mouth daily   pantoprazole (PROTONIX) 40 MG EC tablet Take 1 tablet (40 mg) by mouth daily   potassium chloride SA (K-DUR/KLOR-CON M) 20 MEQ CR tablet Take 1 tablet (20 mEq) by mouth 2 times daily   baclofen (LIORESAL) 10 MG tablet Take 1 tablet (10 mg) by mouth 3 times daily   acetaminophen (TYLENOL) 500 MG tablet Take 2 tablets (1,000 mg) by mouth every 6 hours as needed for mild pain   ASPIRIN NOT PRESCRIBED (INTENTIONAL) Please choose reason not prescribed, below   ascorbic acid 500 MG TABS Take 1 tablet (500 mg) by mouth daily   beta carotene 17755 UNIT capsule Take 1 capsule (25,000 Units) by mouth daily   tamsulosin (FLOMAX) 0.4 MG capsule Take 1 capsule (0.4 mg) by mouth daily   zinc sulfate (ZINCATE) 220 (50 ZN) MG capsule Take 1 capsule (220 mg) by mouth daily   Sharps Container MISC 1 each every 30 days   Warfarin Therapy Reminder 1 each continuous prn     No current facility-administered medications on file prior to visit.     Social History   Substance Use Topics     Smoking status: Former Smoker     Packs/day: 0.50     Years: 30.00     Types: Cigarettes     Quit date: 1/1/2017     Smokeless tobacco: Never Used     Alcohol use No       Family History   Problem Relation Age of Onset     Hypertension Mother      Type 2 Diabetes Father       Hypertension Father      Myocardial Infarction No family hx of      CEREBROVASCULAR DISEASE No family hx of      Coronary Artery Disease Early Onset No family hx of      Colon Cancer No family hx of      Prostate Cancer No family hx of          ROS:  CONSTITUTIONAL:NEGATIVE for fever, chills, change in weight  EYES: NEGATIVE for vision changes or irritation  ENT/MOUTH: NEGATIVE for ear, mouth and throat problems  RESP:NEGATIVE for significant cough or SOB  GI: NEGATIVE for nausea, abdominal pain, heartburn, or change in bowel habits    OBJECTIVE:  Skin area involved is 10 cm by 10 cm on the left,  superior chest.       The skin appears slightly discolored from bruising, mildly  Swollen with some small amount of  fluid palpable around the pacemaker.  The surgical wound has no erythema, no purulent drainage-  There are a few drops of dried blood on the medial end of the 5 cm surgical wound,    no purulent drainage from the wound  Few drops dry serosanguinous drainage from the wound  General Appearance: healthy, alert, active and no distress  Head: Normocephalic. No masses, lesions, tenderness or abnormalities  Eyes: conjuctiva clear, PERRL, EOM intact  Mouth: normal  Neck: Supple, no cervical adenopathy, no thyromegaly, Full range of motion in all planes  Heart: regular rate and rhythm  with normal S1, S2 ; no murmur, rub or gallops  Lungs: clear to auscultation  Abdomen: Soft, nontender.  Normal bowel sounds.      CXR-  No pneumothorax, no effusion, no pericardial effusion    ASSESSMENT:  Status post placement of cardiac pacemaker     - XR Chest 2 Views; Future    Disruption of external surgical wound, initial encounter   mild minor drainage from site of pacemake implant-  No signs of infection    No antibiotics needed  Keep planned followup at  The pacemaker clinic     Go to Urgent care or Emergency Department if worsening fevers/chills and/or spreading infection.   .  Use Tylenol or ibuprofen for pain or fever.     ......

## 2017-11-05 NOTE — NURSING NOTE
"Chief Complaint   Patient presents with     Urgent Care     Pt states heart surgery, feel like theres some watery feeling and where the surgery was done it looks purple x last night        Initial /80 (BP Location: Left arm, Patient Position: Chair, Cuff Size: Adult Regular)  Pulse 103  Temp 98.3  F (36.8  C) (Oral)  Resp 18  Wt 118 lb 8 oz (53.8 kg)  SpO2 100%  BMI 21.67 kg/m2 Estimated body mass index is 21.67 kg/(m^2) as calculated from the following:    Height as of 10/16/17: 5' 2\" (1.575 m).    Weight as of this encounter: 118 lb 8 oz (53.8 kg).  Medication Reconciliation: complete      "

## 2017-11-05 NOTE — MR AVS SNAPSHOT
After Visit Summary   11/5/2017    Luke Henao    MRN: [id number]           Patient Information     Date Of Birth          1965        Visit Information        Provider Department      11/5/2017 9:20 AM Poornima Kinsey MD [facility]        Today's Diagnoses     Status post placement of cardiac pacemaker    -  1    Disruption of external surgical wound, initial encounter           Follow-ups after your visit        Your next 10 appointments already scheduled     Nov 06, 2017  9:45 AM CST   [facility]    [address] [address]   [phone]            Nov 08, 2017 11:00 AM CST   (Arrive by 10:45 AM)   RETURN ARRHYTHMIA with RUBI Quiroz CNP   [facility])    [address] [place] [address]   [phone]            Nov 30, 2017 10:00 AM CST   Lab with [facility]   [facility])    [address] [place] [address]   [phone]            Nov 30, 2017 10:30 AM CST   (Arrive by 10:15 AM)   Implanted Defibulator with Uc Cv Device [address] [place] [address])    [address] [place] [address]   [phone]            Nov 30, 2017 11:00 AM CST   (Arrive by 10:45 AM)   CORE RETURN with Emily Collado NP   [facility])    [address] [place] [address]   [phone]            Feb 26, 2018 11:00 AM CST   (Arrive by 10:45 AM)   Implanted Defibulator with Uc Cv Device [address] [place] [address])    [address] [place] [address]   [phone]            Feb 26, 2018 11:30 AM CST   (Arrive by  "11:15 AM)   RETURN ARRHYTHMIA with RUBI Carreon [facility] ([facility])    [address] [facility]  [address] [facility] [address] [phone]              Who to contact     If you have questions or need follow up information about today's clinic visit or your schedule please contact [facility] directly at [phone].  Normal or non-critical lab and imaging results will be communicated to you by [employer], letter or phone within 4 business days after the clinic has received the results. If you do not hear from us within 7 days, please contact the clinic through [employer] or phone. If you have a critical or abnormal lab result, we will notify you by phone as soon as possible.  Submit refill requests through [employer] or call your pharmacy and they will forward the refill request to us. Please allow 3 business days for your refill to be completed.          Additional Information About Your Visit        [employer] Information     [employer] lets you send messages to your doctor, view your test results, renew your prescriptions, schedule appointments and more. To sign up, go to [email or web address] . Click on \"Log in\" on the left side of the screen, which will take you to the Welcome page. Then click on \"Sign up Now\" on the right side of the page.     You will be asked to enter the access code listed below, as well as some personal information. Please follow the directions to create your username and password.     Your access code is: [email or web address]  Expires: 2017  5:30 AM     Your access code will  in 90 days. If you need help or a new code, please call your [place] clinic or [phone].        Care EveryWhere ID     This is your Care EveryWhere ID. This could be used by other organizations to access your [place] medical records  [id number]        Your Vitals Were     Pulse Temperature Respirations Pulse Oximetry BMI " (Body Mass Index)       103 98.3  F (36.8  C) (Oral) 18 100% 21.67 kg/m2        Blood Pressure from Last 3 Encounters:   11/05/17 100/80   10/28/17 103/77   10/16/17 130/90    Weight from Last 3 Encounters:   11/05/17 118 lb 8 oz (53.8 kg)   10/16/17 120 lb 11.2 oz (54.7 kg)   10/10/17 121 lb 1.6 oz (54.9 kg)               Primary Care Provider Office Phone # Fax #    Shanna Church [phone]       [address] [facility] [address]        Equal Access to Services     CUBA AYALA : Hadii aad ku hadasho Soericka, waaxda luqadaha, qaybta kaalmada adejoyada, karen nettles . So [facility] [phone].    ATENCIÓN: Si habla español, tiene a suarez disposición servicios gratuitos de asistencia lingüística. Llame al [phone].    We comply with applicable federal civil rights laws and [place] laws. We do not discriminate on the basis of race, color, national origin, age, disability, sex, sexual orientation, or gender identity.            Thank you!     Thank you for choosing [facility]  for your care. Our goal is always to provide you with excellent care. Hearing back from our patients is one way we can continue to improve our services. Please take a few minutes to complete the written survey that you may receive in the mail after your visit with us. Thank you!             Your Updated Medication List - Protect others around you: Learn how to safely use, store and throw away your medicines at www.disposemymeds.org.          This list is accurate as of: 11/5/17  8:33 PM.  Always use your most recent med list.                   Brand Name Dispense Instructions for use Diagnosis    acetaminophen 500 MG tablet    TYLENOL    30 tablet    Take 2 tablets (1,000 mg) by mouth every 6 hours as needed for mild pain    Lower extremity pain, bilateral, Shakiness       alcohol swab prep pads     100 each    Use to swab area of injection/mary alice as directed 3 x per  day        ascorbic acid 500 MG Tabs     2 tablet    Take 1 tablet (500 mg) by mouth daily    Coronary artery disease involving native coronary artery of native heart without angina pectoris       ASPIRIN NOT PRESCRIBED    INTENTIONAL    0 each    Please choose reason not prescribed, below    Cardiogenic shock (H), Type 2 diabetes mellitus without complication, with long-term current use of insulin (H)       atorvastatin 40 MG tablet    LIPITOR    60 tablet    1 tablet (40 mg) by Oral or Feeding Tube route daily    Coronary artery disease involving native coronary artery of native heart without angina pectoris, Cardiogenic shock (H)       baclofen 10 MG tablet    LIORESAL    90 tablet    Take 1 tablet (10 mg) by mouth 3 times daily    Lower limb symptom       beta carotene 05025 UNIT capsule     2 capsule    Take 1 capsule (25,000 Units) by mouth daily    Ischemic cardiomyopathy       blood glucose monitoring test strip    no brand specified    100 strip    Use to test blood sugar 3 times daily or as directed.    Diabetes mellitus type 2, insulin dependent (H)       bumetanide 0.5 MG tablet    BUMEX    120 tablet    Take 1 tablet (0.5 mg) by mouth daily    Ischemic cardiomyopathy       cephalexin 500 MG tablet     21 tablet    Take 500 mg by mouth 3 times daily for 7 days    S/P ICD (internal cardiac defibrillator) procedure       cetirizine 10 MG tablet    zyrTEC    60 tablet    Take 1 tablet (10 mg) by mouth daily    Seasonal allergic rhinitis       clopidogrel 75 MG tablet    PLAVIX    60 tablet    Take 1 tablet (75 mg) by mouth daily    ST elevation myocardial infarction (STEMI), unspecified artery (H)       digoxin 125 MCG tablet    LANOXIN    60 tablet    Take 1 tablet (125 mcg) by mouth daily    Coronary artery disease involving native coronary artery of native heart without angina pectoris, Cardiogenic shock (H)       finasteride 5 MG tablet    PROSCAR    60 tablet    Take 1 tablet (5 mg) by mouth daily     Hypertrophy of prostate with urinary obstruction       insulin glargine 100 UNIT/ML injection    LANTUS    18 mL    Inject 25 Units Subcutaneous every morning (before breakfast)        insulin pen needle 32G X 4 MM    BD KYLEE U/F    100 each    Use 3 daily or as directed.    Diabetes mellitus type 2, insulin dependent (H)       lisinopril 2.5 MG tablet    PRINIVIL/Zestril    30 tablet    Take 1 tablet (2.5 mg) by mouth daily    Ischemic cardiomyopathy       METOPROLOL SUCCINATE ER PO      Take 25 mg by mouth daily        oxyCODONE-acetaminophen 5-325 MG per tablet    PERCOCET    28 tablet    Take 1 tablet by mouth every 4 hours as needed for other (mild or moderate pain)    S/P ICD (internal cardiac defibrillator) procedure       pantoprazole 40 MG EC tablet    PROTONIX    60 tablet    Take 1 tablet (40 mg) by mouth daily    Gastrointestinal hemorrhage associated with other gastritis       potassium chloride SA 20 MEQ CR tablet    K-DUR/KLOR-CON M    120 tablet    Take 1 tablet (20 mEq) by mouth 2 times daily    Ischemic cardiomyopathy       Sharps Container Misc     1 each    1 each every 30 days    Diabetes mellitus type 2, insulin dependent (H)       tamsulosin 0.4 MG capsule    FLOMAX    60 capsule    Take 1 capsule (0.4 mg) by mouth daily    Ischemic cardiomyopathy       * Warfarin Therapy Reminder      1 each continuous prn    Deep vein thrombosis (DVT) of left lower extremity, unspecified chronicity, unspecified vein (H)       * warfarin 3 MG tablet    COUMADIN    100 tablet    Take one tablet daily as directed by the [facility]    Deep vein thrombosis (DVT) of left lower extremity, unspecified chronicity, unspecified vein (H)       zinc sulfate 220 (50 ZN) MG capsule    ZINCATE    2 capsule    Take 1 capsule (220 mg) by mouth daily    Ischemic cardiomyopathy       * Notice:  This list has 2 medication(s) that are the same as other medications prescribed for you. Read the directions carefully, and ask your doctor or  other care provider to review them with you.

## 2017-11-06 ENCOUNTER — ANTICOAGULATION THERAPY VISIT (OUTPATIENT)
Dept: ANTICOAGULATION | Facility: CLINIC | Age: 52
End: 2017-11-06
Payer: COMMERCIAL

## 2017-11-06 DIAGNOSIS — Z79.01 LONG-TERM (CURRENT) USE OF ANTICOAGULANTS: ICD-10-CM

## 2017-11-06 LAB — INR POINT OF CARE: 4.1 (ref 0.86–1.14)

## 2017-11-06 PROCEDURE — 85610 PROTHROMBIN TIME: CPT | Mod: QW

## 2017-11-06 PROCEDURE — 36416 COLLJ CAPILLARY BLOOD SPEC: CPT

## 2017-11-06 NOTE — MR AVS SNAPSHOT
Luke WERNER Earlene   11/6/2017 9:45 AM   Anticoagulation Therapy Visit    Description:  52 year old male   Provider:  [facility]ILINGUAL [facility];  ANTICOAGULATION CLINIC   Department:   Anti Coagulation           INR as of 11/6/2017     Today's INR 4.1!      Anticoagulation Summary as of 11/6/2017     INR goal 2.0-3.0   Today's INR 4.1!   Full instructions 11/6: Hold; 11/10: 1.5 mg; 11/13: 1.5 mg; Otherwise 3 mg every day   Next INR check 11/13/2017    Indications   Cerebrovascular accident (CVA) due to thrombosis of cerebral artery (H) (Resolved) [I63.30]  DVT (deep venous thrombosis) (H) [I82.409]  Long-term (current) use of anticoagulants [Z79.01] [Z79.01]         Your next Anticoagulation Clinic appointment(s)     Nov 13, 2017  1:45 PM CST   Anticoagulation [facility]  [facility]    [address] [place] [address] [phone]              Contact Numbers     [facility]  Please call  [phone] to cancel and/or reschedule your appointment   Please call  [phone] with any problems or questions regarding your therapy.        November 2017 Details    Sun Mon Tue Wed Thu Fri Sat        1               2               3               4                 5               6      Hold   See details      7      3 mg         8      3 mg         9      3 mg         10      1.5 mg         11      3 mg           12      3 mg         13            14               15               16               17               18                 19               20               21               22               23               24               25                 26               27               28               29               30                  Date Details   11/06 This INR check       Date of next INR:  11/13/2017         How to take your warfarin dose     To take:  1.5 mg Take 0.5 of a 3 mg tablet.    To take:  3 mg Take 1 of  the 3 mg tablets.    Hold Do not take your warfarin dose. See the Details table to the right for additional instructions.

## 2017-11-06 NOTE — PROGRESS NOTES
ANTICOAGULATION FOLLOW-UP CLINIC VISIT    Patient Name:  Luke Henao  Date:  11/6/2017  Contact Type:  Face to Face    SUBJECTIVE:     Patient Findings     Positives No Problem Findings           OBJECTIVE    INR Protime   Date Value Ref Range Status   11/06/2017 4.1 (A) 0.86 - 1.14 Final       ASSESSMENT / PLAN  INR assessment SUPRA    Recheck INR In: 10 DAYS    INR Location Clinic      Anticoagulation Summary as of 11/6/2017     INR goal 2.0-3.0   Today's INR 4.1!   Maintenance plan 3 mg (3 mg x 1) every day   Full instructions 11/6: Hold; 11/10: 1.5 mg; 11/13: 1.5 mg; Otherwise 3 mg every day   Weekly total 21 mg   Plan last modified Velvet Jones RN (10/3/2017)   Next INR check 11/13/2017   Target end date     Indications   Cerebrovascular accident (CVA) due to thrombosis of cerebral artery (H) (Resolved) [I63.30]  DVT (deep venous thrombosis) (H) [I82.409]  Long-term (current) use of anticoagulants [Z79.01] [Z79.01]         Anticoagulation Episode Summary     INR check location [facility]    Preferred lab     Send INR reminders to [facility]    Comments             See the Encounter Report to view Anticoagulation Flowsheet and Dosing Calendar (Go to Encounters tab in chart review, and find the Anticoagulation Therapy Visit)    Dosage adjustment made based on physician directed care plan.    Pina Coyle RN

## 2017-11-08 ENCOUNTER — PRE VISIT (OUTPATIENT)
Dept: CARDIOLOGY | Facility: CLINIC | Age: 52
End: 2017-11-08

## 2017-11-08 ENCOUNTER — OFFICE VISIT (OUTPATIENT)
Dept: CARDIOLOGY | Facility: CLINIC | Age: 52
End: 2017-11-08
Attending: NURSE PRACTITIONER
Payer: COMMERCIAL

## 2017-11-08 VITALS
DIASTOLIC BLOOD PRESSURE: 90 MMHG | WEIGHT: 122.5 LBS | HEIGHT: 62 IN | OXYGEN SATURATION: 99 % | BODY MASS INDEX: 22.54 KG/M2 | HEART RATE: 67 BPM | SYSTOLIC BLOOD PRESSURE: 130 MMHG

## 2017-11-08 DIAGNOSIS — I25.5 ISCHEMIC CARDIOMYOPATHY: ICD-10-CM

## 2017-11-08 DIAGNOSIS — Z95.810 S/P ICD (INTERNAL CARDIAC DEFIBRILLATOR) PROCEDURE: Primary | ICD-10-CM

## 2017-11-08 PROCEDURE — 99212 OFFICE O/P EST SF 10 MIN: CPT | Mod: ZF

## 2017-11-08 PROCEDURE — 99214 OFFICE O/P EST MOD 30 MIN: CPT | Mod: 24 | Performed by: NURSE PRACTITIONER

## 2017-11-08 RX ORDER — LISINOPRIL 2.5 MG/1
5 TABLET ORAL DAILY
Qty: 30 TABLET | Refills: 1 | Status: SHIPPED | OUTPATIENT
Start: 2017-11-08 | End: 2017-11-15

## 2017-11-08 ASSESSMENT — PAIN SCALES - GENERAL: PAINLEVEL: MILD PAIN (3)

## 2017-11-08 NOTE — LETTER
11/8/2017      RE: Luke Henao  [address]  [facility] [id number]       Dear Colleague,    Thank you for the opportunity to participate in the care of your patient, Luke Henao, at the [facility] HEART [facility] at [facility]. Please see a copy of my visit note below.    Electrophysiology Clinic Note  HPI:   Mr. Henao is a 52 year old male who has a past medical history significant for tobacco use, DM, and CAD s/p PCI. He presented to [facility] 03/26/17 with RCA STEMI transferred to [facility] after POBA, for further workup and management of cardiogenic shock, which led to a 6-week hospital stay  (3/29/17-5/12/17), revascularization with  DESx7 to RCA (culprit), LCx (, now closed) and LAD on 3/27, with refractory cardiogenic shock requiring intraaortic balloon pump, eventually subclavian balloon pump, multiple episodes of PEDRO, sepsis, sacral ulcer, DVT on a/c, b/l hemispheric infarcts likely due to watershed ischemia,  eventually refractory CS was attributed to ischemic MR on iCM (EF25-30%), and pt was treated with percutaneous MV repair with MitraClip (5/1/17), which led to reduction in MR from severe to mild, and substantial clinic improvement, weaning of IABP, and eventual discharge to rehabilitation, where pt stayed from 5/12/17 -5/26/17. It appears that he was on lisinopril up until a visit with his primary care physician Dr. Church on 9/26/2017, at which time it was discontinued due to history of PEDRO.  Nonetheless, his LVEF has not recovered from 30-35% in 3/2017 despite treatment with ACE inhibition and beta blockade following revascularization. Decision was made to pursue ICD. He underwent ICD implant on 10/26/17. He was noted to have small size hematoma post implant at discharge next day. Since his   Scr has been stable ACEI with low dose was slowly reintroduced. At one week check, he was noted to have a large hematoma, 12 x 14 x 3  cm, that was tender to the touch and firm.  There is no redness or drainage. He also had a rash isolated to the area that were exposed to the chloroprep. He was scheduled for close follow up.   He presents today for ongoing monitoring of ICD pocket hematoma. Hematoma occurred in the setting of supra therapeutic INRs. He reports that he has some bloody drainage from the medial aspect of incision starting on 11/5/17. He went to Urgent care where they cleansed site, placed dermabond on, and loose guaze dressing over the top. Incision check today is stable. No further drainage from incision since Sunday. Incision is not taught and is not under tension. Sit is still slightly ecchymotic and firm. He denies any fevers, chills, or purulent drainage from the site. He is tolerating low dose ACEI well and BMP has remained stable.  He is noted to be sightly hypertensive today. Current cardiac medications include: Lipitor, Digoxin, Plavix, Lisinopril, Metoprolol, and Warfarin.     PAST MEDICAL HISTORY:  Past Medical History:   Diagnosis Date     CAD (coronary artery disease) 2017    RCA STEMI s/p balloon angioplasty and multiple Sofie to RCA, LCx, and LAD     DVT (deep venous thrombosis) (H) 2017    left internal jugular (line-associated); left common femoral; on coumadin     Ischemic cardiomyopathy 2017    EF 30-35%     Ischemic stroke (H) 2017    no residual deficits     Lower GI bleed 2017    due to rectal ulcer     Mitral valve insufficiency, ischemic 2017    s/p Mitral Clip placement      Type 2 diabetes mellitus (H)        CURRENT MEDICATIONS:  Current Outpatient Prescriptions   Medication Sig Dispense Refill     cephalexin 500 MG tablet Take 500 mg by mouth 3 times daily for 7 days 21 tablet 0     METOPROLOL SUCCINATE ER PO Take 25 mg by mouth daily       oxyCODONE-acetaminophen (PERCOCET) 5-325 MG per tablet Take 1 tablet by mouth every 4 hours as needed for other (mild or moderate pain) 28 tablet 0     lisinopril  (PRINIVIL/ZESTRIL) 2.5 MG tablet Take 1 tablet (2.5 mg) by mouth daily 30 tablet 1     warfarin (COUMADIN) 3 MG tablet Take one tablet daily as directed by the  tablet 0     blood glucose monitoring (NO BRAND SPECIFIED) test strip Use to test blood sugar 3 times daily or as directed. 100 strip 0     digoxin (LANOXIN) 125 MCG tablet Take 1 tablet (125 mcg) by mouth daily 60 tablet 5     insulin pen needle (BD KYLEE U/F) 32G X 4 MM Use 3 daily or as directed. 100 each prn     bumetanide (BUMEX) 0.5 MG tablet Take 1 tablet (0.5 mg) by mouth daily 120 tablet 0     alcohol swab prep pads Use to swab area of injection/mary alice as directed 3 x per day 100 each 11     insulin glargine (LANTUS) 100 UNIT/ML injection Inject 25 Units Subcutaneous every morning (before breakfast) 18 mL 3     atorvastatin (LIPITOR) 40 MG tablet 1 tablet (40 mg) by Oral or Feeding Tube route daily 60 tablet 7     cetirizine (ZYRTEC) 10 MG tablet Take 1 tablet (10 mg) by mouth daily 60 tablet 11     clopidogrel (PLAVIX) 75 MG tablet Take 1 tablet (75 mg) by mouth daily 60 tablet 11     finasteride (PROSCAR) 5 MG tablet Take 1 tablet (5 mg) by mouth daily 60 tablet 11     pantoprazole (PROTONIX) 40 MG EC tablet Take 1 tablet (40 mg) by mouth daily 60 tablet 11     potassium chloride SA (K-DUR/KLOR-CON M) 20 MEQ CR tablet Take 1 tablet (20 mEq) by mouth 2 times daily 120 tablet 11     baclofen (LIORESAL) 10 MG tablet Take 1 tablet (10 mg) by mouth 3 times daily 90 tablet 3     acetaminophen (TYLENOL) 500 MG tablet Take 2 tablets (1,000 mg) by mouth every 6 hours as needed for mild pain 30 tablet 0     ASPIRIN NOT PRESCRIBED (INTENTIONAL) Please choose reason not prescribed, below 0 each 0     ascorbic acid 500 MG TABS Take 1 tablet (500 mg) by mouth daily 2 tablet 0     beta carotene 28430 UNIT capsule Take 1 capsule (25,000 Units) by mouth daily 2 capsule 0     tamsulosin (FLOMAX) 0.4 MG capsule Take 1 capsule (0.4 mg) by mouth daily 60 capsule  0     zinc sulfate (ZINCATE) 220 (50 ZN) MG capsule Take 1 capsule (220 mg) by mouth daily 2 capsule 0     Sharps Container MISC 1 each every 30 days 1 each 0     Warfarin Therapy Reminder 1 each continuous prn         PAST SURGICAL HISTORY:  Past Surgical History:   Procedure Laterality Date     C INSERT ELECTRD LEADS/REPOSTION  10/27/2017          COLONOSCOPY N/A 4/17/2017    Procedure: COLONOSCOPY;  Surgeon: Rashaad Bundy MD;  Location: [facility]     INSERT INTRAAORTIC BALLOON PUMP Right 4/19/2017    Procedure: INSERT INTRAAORTIC BALLOON PUMP;  Right Subclavian Intra Aortic Balloon Pump Insertion using Maquet 40cc Ballon Catheter, Implentation of 8mm Gelweave Woven Vascular Prosthesis, Removal of Left Femoral Ballon Pump Catheter, Flouroscopy;  Surgeon: Keshav Leung MD;  Location: [facility]     PERCUTANEOUS MITRAL VALVE REPAIR N/A 5/1/2017    Procedure: PERCUTANEOUS MITRAL VALVE REPAIR ANESTHESIA;  Mitraclip Procedure Possible Cardiopulmonary Bypass ;  Surgeon: Ron Cortez MD;  Location: [facility]     SUBCLAVIAN AORTIC VALVE IMPLANT N/A 5/8/2017    Procedure: SUBCLAVIAN AORTIC VALVE IMPLANT;  Right Subclavian Graft Removal ;  Surgeon: Keshav Leung MD;  Location: [facility]       ALLERGIES:   No Known Allergies    FAMILY HISTORY:  Family History   Problem Relation Age of Onset     Hypertension Mother      Type 2 Diabetes Father      Hypertension Father      Myocardial Infarction No family hx of      CEREBROVASCULAR DISEASE No family hx of      Coronary Artery Disease Early Onset No family hx of      Colon Cancer No family hx of      Prostate Cancer No family hx of        SOCIAL HISTORY:  Social History   Substance Use Topics     Smoking status: Former Smoker     Packs/day: 0.50     Years: 30.00     Types: Cigarettes     Quit date: 1/1/2017     Smokeless tobacco: Never Used     Alcohol use No       ROS:   A comprehensive 10 point review of systems negative other than as mentioned in  "HPI.  Exam:  /90  Pulse 67  Ht 1.575 m (5' 2\")  Wt 55.6 kg (122 lb 8 oz)  SpO2 99%  BMI 22.41 kg/m2  GENERAL APPEARANCE: alert and no distress  HEENT: no icterus, no xanthelasmas, normal pupil size and reaction, normal palate, mucosa moist, no central cyanosis  NECK: no adenopathy, no asymmetry, masses, or scars, thyroid normal to palpation and no bruits, JVP not elevated  RESPIRATORY: lungs clear to auscultation - no rales, rhonchi or wheezes, no use of accessory muscles, no retractions, respirations are unlabored, normal respiratory rate  CARDIOVASCULAR: regular rhythm, normal S1 with physiologic split S2, no S3 or S4 and no murmur, click or rub, precordium quiet with normal PMI.  ABDOMEN: soft, non tender, bowel sounds normal, non-distended  EXTREMITIES: peripheral pulses normal, no edema  NEURO: alert and oriented to person/place/time, normal speech, gait and affect  SKIN: no ecchymoses, no rashes  PSYCH: normal affect, cooperative    Labs:  Reviewed.     Testing/Procedures:  8/2017 ECHOCARDIOGRAM:   Interpretation Summary  Moderately (EF 30-35%) reduced left ventricular function is present (bi-plane  32%). Basal lateral, inferior, and inferoseptal hypokinesis.  Global RV function mildly reduced.  S/p trans-catheter MVR 6/2017. Mild mitral insufficiency is present. Mean  valve gradient 5 mmHg at a heart rate of 104 bpm.  Right ventricular systolic pressure is 40mmHg above the right atrial pressure.  The inferior vena cava is normal in size with preserved respiratory  variability.  No pericardial effusion is present.        Assessment and Plan:   Mr. Henao is a 52 year old male who has a past medical history significant for tobacco use, DM, and CAD s/p PCI. He presented to [facility] 03/26/17 with RCA STEMI transferred to [facility] after POBA, for further workup and management of cardiogenic shock, which led to a 6-week hospital stay  (3/29/17-5/12/17), " revascularization with  DESx7 to RCA (culprit), LCx (, now closed) and LAD on 3/27, with refractory cardiogenic shock requiring intraaortic balloon pump, eventually subclavian balloon pump, multiple episodes of PEDRO, sepsis, sacral ulcer, DVT on a/c, b/l hemispheric infarcts likely due to watershed ischemia,  eventually refractory CS was attributed to ischemic MR on iCM (EF25-30%), and pt was treated with percutaneous MV repair with MitraClip (5/1/17), which led to reduction in MR from severe to mild, and substantial clinic improvement, s/p ICD 10/26/17 c/b device pocket hematoma.     Device Pocket Hematoma:  S/p ICD c/b device pocket hematoma. Hematoma occurred in the setting of supra therapeutic INRs. He was noted to have small size hematoma post implant at discharge next day. At one week check, he was noted to have a large hematoma, 12 x 14 x 3 cm, that was tender to the touch and firm. He reports that he has some bloody drainage from the medial aspect of incision starting on 11/5/17. He went to Urgent care where they cleansed site, placed dermabond on, and loose guaze dressing over the top. Incision check today is stable. No further drainage from incision since Sunday. Incision is not under tension and no s/s of infection. Reviewed with Dr. Patel given report of draining from site over the weekend. Decision was made that pocket did not require washout at this time. His antibiotics have been extended. We will have him closely followed to monitor for hematoma resolution.He has been instructed to call with any s/s of infection, further drainage, or worsening hematoma.    ICM LVEF 25-30%, NYHA II:  1. ACEi/ARB:  It appears that he was on lisinopril up until a visit with his primary care physician Dr. Church on 9/26/2017, at which time it was discontinued due to history of PEDRO.Scr has been stable ACEI with low dose was slowly reintroducedIncrease Lisinopril to 5 mg daily with BMP in 1 week.  2. BB: Continue  Metoprolol   3. Aldosterone antagonist: deferred during ACEI up titration.  4. SCD prophylaxis: s/p ICD 10/26/17 c/b device site hematoma as above  5. Fluid status: euvolemic      The patient states understanding and is agreeable with plan.   RUBI Joseph CNP  Pager: [phone]    MARVIN TELLEZ

## 2017-11-08 NOTE — PROGRESS NOTES
Electrophysiology Clinic Note  HPI:   Mr. Henao is a 52 year old male who has a past medical history significant for tobacco use, DM, and CAD s/p PCI. He presented to [facility] 03/26/17 with RCA STEMI transferred to [facility] after POBA, for further workup and management of cardiogenic shock, which led to a 6-week hospital stay  (3/29/17-5/12/17), revascularization with  DESx7 to RCA (culprit), LCx (, now closed) and LAD on 3/27, with refractory cardiogenic shock requiring intraaortic balloon pump, eventually subclavian balloon pump, multiple episodes of PEDRO, sepsis, sacral ulcer, DVT on a/c, b/l hemispheric infarcts likely due to watershed ischemia,  eventually refractory CS was attributed to ischemic MR on iCM (EF25-30%), and pt was treated with percutaneous MV repair with MitraClip (5/1/17), which led to reduction in MR from severe to mild, and substantial clinic improvement, weaning of IABP, and eventual discharge to rehabilitation, where pt stayed from 5/12/17 -5/26/17. It appears that he was on lisinopril up until a visit with his primary care physician Dr. Church on 9/26/2017, at which time it was discontinued due to history of PEDRO.  Nonetheless, his LVEF has not recovered from 30-35% in 3/2017 despite treatment with ACE inhibition and beta blockade following revascularization. Decision was made to pursue ICD. He underwent ICD implant on 10/26/17. He was noted to have small size hematoma post implant at discharge next day. Since his   Scr has been stable ACEI with low dose was slowly reintroduced. At one week check, he was noted to have a large hematoma, 12 x 14 x 3 cm, that was tender to the touch and firm.  There is no redness or drainage. He also had a rash isolated to the area that were exposed to the chloroprep. He was scheduled for close follow up.   He presents today for ongoing monitoring of ICD pocket hematoma. Hematoma occurred in the setting of supra therapeutic  INRs. He reports that he has some bloody drainage from the medial aspect of incision starting on 11/5/17. He went to Urgent care where they cleansed site, placed dermabond on, and loose guaze dressing over the top. Incision check today is stable. No further drainage from incision since Sunday. Incision is not taught and is not under tension. Sit is still slightly ecchymotic and firm. He denies any fevers, chills, or purulent drainage from the site. He is tolerating low dose ACEI well and BMP has remained stable.  He is noted to be sightly hypertensive today. Current cardiac medications include: Lipitor, Digoxin, Plavix, Lisinopril, Metoprolol, and Warfarin.     PAST MEDICAL HISTORY:  Past Medical History:   Diagnosis Date     CAD (coronary artery disease) 2017    RCA STEMI s/p balloon angioplasty and multiple Sofie to RCA, LCx, and LAD     DVT (deep venous thrombosis) (H) 2017    left internal jugular (line-associated); left common femoral; on coumadin     Ischemic cardiomyopathy 2017    EF 30-35%     Ischemic stroke (H) 2017    no residual deficits     Lower GI bleed 2017    due to rectal ulcer     Mitral valve insufficiency, ischemic 2017    s/p Mitral Clip placement      Type 2 diabetes mellitus (H)        CURRENT MEDICATIONS:  Current Outpatient Prescriptions   Medication Sig Dispense Refill     cephalexin 500 MG tablet Take 500 mg by mouth 3 times daily for 7 days 21 tablet 0     METOPROLOL SUCCINATE ER PO Take 25 mg by mouth daily       oxyCODONE-acetaminophen (PERCOCET) 5-325 MG per tablet Take 1 tablet by mouth every 4 hours as needed for other (mild or moderate pain) 28 tablet 0     lisinopril (PRINIVIL/ZESTRIL) 2.5 MG tablet Take 1 tablet (2.5 mg) by mouth daily 30 tablet 1     warfarin (COUMADIN) 3 MG tablet Take one tablet daily as directed by the  tablet 0     blood glucose monitoring (NO BRAND SPECIFIED) test strip Use to test blood sugar 3 times daily or as directed. 100 strip 0     digoxin  (LANOXIN) 125 MCG tablet Take 1 tablet (125 mcg) by mouth daily 60 tablet 5     insulin pen needle (BD KYLEE U/F) 32G X 4 MM Use 3 daily or as directed. 100 each prn     bumetanide (BUMEX) 0.5 MG tablet Take 1 tablet (0.5 mg) by mouth daily 120 tablet 0     alcohol swab prep pads Use to swab area of injection/mary alice as directed 3 x per day 100 each 11     insulin glargine (LANTUS) 100 UNIT/ML injection Inject 25 Units Subcutaneous every morning (before breakfast) 18 mL 3     atorvastatin (LIPITOR) 40 MG tablet 1 tablet (40 mg) by Oral or Feeding Tube route daily 60 tablet 7     cetirizine (ZYRTEC) 10 MG tablet Take 1 tablet (10 mg) by mouth daily 60 tablet 11     clopidogrel (PLAVIX) 75 MG tablet Take 1 tablet (75 mg) by mouth daily 60 tablet 11     finasteride (PROSCAR) 5 MG tablet Take 1 tablet (5 mg) by mouth daily 60 tablet 11     pantoprazole (PROTONIX) 40 MG EC tablet Take 1 tablet (40 mg) by mouth daily 60 tablet 11     potassium chloride SA (K-DUR/KLOR-CON M) 20 MEQ CR tablet Take 1 tablet (20 mEq) by mouth 2 times daily 120 tablet 11     baclofen (LIORESAL) 10 MG tablet Take 1 tablet (10 mg) by mouth 3 times daily 90 tablet 3     acetaminophen (TYLENOL) 500 MG tablet Take 2 tablets (1,000 mg) by mouth every 6 hours as needed for mild pain 30 tablet 0     ASPIRIN NOT PRESCRIBED (INTENTIONAL) Please choose reason not prescribed, below 0 each 0     ascorbic acid 500 MG TABS Take 1 tablet (500 mg) by mouth daily 2 tablet 0     beta carotene 23882 UNIT capsule Take 1 capsule (25,000 Units) by mouth daily 2 capsule 0     tamsulosin (FLOMAX) 0.4 MG capsule Take 1 capsule (0.4 mg) by mouth daily 60 capsule 0     zinc sulfate (ZINCATE) 220 (50 ZN) MG capsule Take 1 capsule (220 mg) by mouth daily 2 capsule 0     Sharps Container MISC 1 each every 30 days 1 each 0     Warfarin Therapy Reminder 1 each continuous prn         PAST SURGICAL HISTORY:  Past Surgical History:   Procedure Laterality Date     C INSERT ELECTRD  "LEADS/REPOSTION  10/27/2017          COLONOSCOPY N/A 4/17/2017    Procedure: COLONOSCOPY;  Surgeon: Rashaad Bundy MD;  Location: [facility]     INSERT INTRAAORTIC BALLOON PUMP Right 4/19/2017    Procedure: INSERT INTRAAORTIC BALLOON PUMP;  Right Subclavian Intra Aortic Balloon Pump Insertion using Maquet 40cc Ballon Catheter, Implentation of 8mm Gelweave Woven Vascular Prosthesis, Removal of Left Femoral Ballon Pump Catheter, Flouroscopy;  Surgeon: Keshav Leung MD;  Location: [facility]     PERCUTANEOUS MITRAL VALVE REPAIR N/A 5/1/2017    Procedure: PERCUTANEOUS MITRAL VALVE REPAIR ANESTHESIA;  Mitraclip Procedure Possible Cardiopulmonary Bypass ;  Surgeon: Ron Cortez MD;  Location: [facility]     SUBCLAVIAN AORTIC VALVE IMPLANT N/A 5/8/2017    Procedure: SUBCLAVIAN AORTIC VALVE IMPLANT;  Right Subclavian Graft Removal ;  Surgeon: Keshav Leung MD;  Location: [facility]       ALLERGIES:   No Known Allergies    FAMILY HISTORY:  Family History   Problem Relation Age of Onset     Hypertension Mother      Type 2 Diabetes Father      Hypertension Father      Myocardial Infarction No family hx of      CEREBROVASCULAR DISEASE No family hx of      Coronary Artery Disease Early Onset No family hx of      Colon Cancer No family hx of      Prostate Cancer No family hx of        SOCIAL HISTORY:  Social History   Substance Use Topics     Smoking status: Former Smoker     Packs/day: 0.50     Years: 30.00     Types: Cigarettes     Quit date: 1/1/2017     Smokeless tobacco: Never Used     Alcohol use No       ROS:   A comprehensive 10 point review of systems negative other than as mentioned in HPI.  Exam:  /90  Pulse 67  Ht 1.575 m (5' 2\")  Wt 55.6 kg (122 lb 8 oz)  SpO2 99%  BMI 22.41 kg/m2  GENERAL APPEARANCE: alert and no distress  HEENT: no icterus, no xanthelasmas, normal pupil size and reaction, normal palate, mucosa moist, no central cyanosis  NECK: no adenopathy, no asymmetry, masses, or " scars, thyroid normal to palpation and no bruits, JVP not elevated  RESPIRATORY: lungs clear to auscultation - no rales, rhonchi or wheezes, no use of accessory muscles, no retractions, respirations are unlabored, normal respiratory rate  CARDIOVASCULAR: regular rhythm, normal S1 with physiologic split S2, no S3 or S4 and no murmur, click or rub, precordium quiet with normal PMI.  ABDOMEN: soft, non tender, bowel sounds normal, non-distended  EXTREMITIES: peripheral pulses normal, no edema  NEURO: alert and oriented to person/place/time, normal speech, gait and affect  SKIN: no ecchymoses, no rashes  PSYCH: normal affect, cooperative    Labs:  Reviewed.     Testing/Procedures:  8/2017 ECHOCARDIOGRAM:   Interpretation Summary  Moderately (EF 30-35%) reduced left ventricular function is present (bi-plane  32%). Basal lateral, inferior, and inferoseptal hypokinesis.  Global RV function mildly reduced.  S/p trans-catheter MVR 6/2017. Mild mitral insufficiency is present. Mean  valve gradient 5 mmHg at a heart rate of 104 bpm.  Right ventricular systolic pressure is 40mmHg above the right atrial pressure.  The inferior vena cava is normal in size with preserved respiratory  variability.  No pericardial effusion is present.        Assessment and Plan:   Mr. Henao is a 52 year old male who has a past medical history significant for tobacco use, DM, and CAD s/p PCI. He presented to [facility] 03/26/17 with RCA STEMI transferred to [facility] after POBA, for further workup and management of cardiogenic shock, which led to a 6-week hospital stay  (3/29/17-5/12/17), revascularization with  DESx7 to RCA (culprit), LCx (, now closed) and LAD on 3/27, with refractory cardiogenic shock requiring intraaortic balloon pump, eventually subclavian balloon pump, multiple episodes of PEDRO, sepsis, sacral ulcer, DVT on a/c, b/l hemispheric infarcts likely due to watershed ischemia,  eventually  refractory CS was attributed to ischemic MR on iCM (EF25-30%), and pt was treated with percutaneous MV repair with MitraClip (5/1/17), which led to reduction in MR from severe to mild, and substantial clinic improvement, s/p ICD 10/26/17 c/b device pocket hematoma.     Device Pocket Hematoma:  S/p ICD c/b device pocket hematoma. Hematoma occurred in the setting of supra therapeutic INRs. He was noted to have small size hematoma post implant at discharge next day. At one week check, he was noted to have a large hematoma, 12 x 14 x 3 cm, that was tender to the touch and firm. He reports that he has some bloody drainage from the medial aspect of incision starting on 11/5/17. He went to Urgent care where they cleansed site, placed dermabond on, and loose guaze dressing over the top. Incision check today is stable. No further drainage from incision since Sunday. Incision is not under tension and no s/s of infection. Reviewed with Dr. Patel given report of draining from site over the weekend. Decision was made that pocket did not require washout at this time. His antibiotics have been extended. We will have him closely followed to monitor for hematoma resolution.He has been instructed to call with any s/s of infection, further drainage, or worsening hematoma.    ICM LVEF 25-30%, NYHA II:  1. ACEi/ARB:  It appears that he was on lisinopril up until a visit with his primary care physician Dr. Church on 9/26/2017, at which time it was discontinued due to history of PEDRO.Scr has been stable ACEI with low dose was slowly reintroducedIncrease Lisinopril to 5 mg daily with BMP in 1 week.  2. BB: Continue Metoprolol   3. Aldosterone antagonist: deferred during ACEI up titration.  4. SCD prophylaxis: s/p ICD 10/26/17 c/b device site hematoma as above  5. Fluid status: euvolemic      The patient states understanding and is agreeable with plan.   RUBI Joseph CNP  Pager: [phone]  MARVIN TELLEZ

## 2017-11-08 NOTE — MR AVS SNAPSHOT
After Visit Summary   11/8/2017    Luke Henao    MRN: [id number]           Patient Information     Date Of Birth          1965        Visit Information        Provider Department      11/8/2017 10:45 AM Bertha Rich APRN CNP; [place] [employer] [facility]        Today's Diagnoses     S/P ICD (internal cardiac defibrillator) procedure    -  1    Ischemic cardiomyopathy          Care Instructions      Plan:    Increase Lisinopril to 5 mg daily    Have lab draw in 1 week    Cardiovascular Clinic:   [address] [place] [address]. [place] [address]  Your Care Team:  [facility]   Telephone Number     Bertha Rich NP   [phone]   Ivana Vitale RN  Roseline Day RN  [phone]     For scheduling appts or procedures:    Gladys Villanueva   [phone]   For the Device Clinic (Pacemakers and ICD's)   RN's :   Mony Stuart  During business hours: [phone]     After business hours:   [phone]- select option 4 and ask for job code [id number].       As always, Thank you for trusting us with your health care needs!            Follow-ups after your visit        Follow-up notes from your care team     Return in about 4 weeks (around 12/6/2017).      Your next 10 appointments already scheduled     Nov 13, 2017  1:45 PM CST   Anticoagulation Visit with [facility]   [facility]    [address] [id number] [phone]            Nov 14, 2017  8:30 AM CST   Lab with  LAB   [facility] ([facility])    [address] [place] [address] [phone]            Nov 14, 2017  9:00 AM CST   (Arrive by 8:45 AM)   RETURN ARRHYTHMIA with RUBI Carreon CNP   [facility] ([facility])    [address] [place] [address]  [address]rd [facility] [address] [phone]            Nov 30,  2017 10:00 AM CST   Lab with UC LAB   [facility] ([facility])    [address]  [address] [facility] [address]   [phone]            Nov 30, 2017 10:30 AM CST   (Arrive by 10:15 AM)   Implanted Defibulator with Uc Cv Device 1   [facility] ([facility])    [address]  [address] [facility] [address]   [phone]            Nov 30, 2017 11:00 AM CST   (Arrive by 10:45 AM)   CORE RETURN with Emily Collado NP   [facility] ([facility])    [address]  [address] [facility] [address] [phone]            Feb 26, 2018 11:00 AM CST   (Arrive by 10:45 AM)   Implanted Defibulator with Uc Cv Device 1   [facility] ([facility])    [address]  [address] [facility] [address] [phone]            Feb 26, 2018 11:30 AM CST   (Arrive by 11:15 AM)   RETURN ARRHYTHMIA with RUBI Carreon CNP   [facility] ([facility])    [address] [place] [address] [phone]              Future tests that were ordered for you today     Open Future Orders        Priority Expected Expires Ordered    **Basic metabolic panel FUTURE anytime Routine 11/8/2017 11/8/2018 11/8/2017            Who to contact     If you have questions or need follow up information about today's clinic visit or your schedule please contact [facility] directly at [phone].  Normal or non-critical lab and imaging results will be communicated to you by MyChart, letter or phone within 4 business days after the clinic has received the results. If you do not hear from us within 7 days, please contact the clinic through MyChart or phone. If you have a critical or abnormal lab result, we will notify you by phone as soon as possible.  Submit refill requests through [employer] or call your  "pharmacy and they will forward the refill request to us. Please allow 3 business days for your refill to be completed.          Additional Information About Your Visit        [employer] Information     [employer] lets you send messages to your doctor, view your test results, renew your prescriptions, schedule appointments and more. To sign up, go to [email or web address] . Click on \"Log in\" on the left side of the screen, which will take you to the Welcome page. Then click on \"Sign up Now\" on the right side of the page.     You will be asked to enter the access code listed below, as well as some personal information. Please follow the directions to create your username and password.     Your access code is: [email or web address]  Expires: 2017  5:30 AM     Your access code will  in 90 days. If you need help or a new code, please call your [place] clinic or [phone].        Care EveryWhere ID     This is your Care EveryWhere ID. This could be used by other organizations to access your [place] medical records  [id number]        Your Vitals Were     Pulse Height Pulse Oximetry BMI (Body Mass Index)          67 1.575 m (5' 2\") 99% 22.41 kg/m2         Blood Pressure from Last 3 Encounters:   17 130/90   17 100/80   10/28/17 103/77    Weight from Last 3 Encounters:   17 55.6 kg (122 lb 8 oz)   17 53.8 kg (118 lb 8 oz)   10/16/17 54.7 kg (120 lb 11.2 oz)                 Today's Medication Changes          These changes are accurate as of: 17 12:02 PM.  If you have any questions, ask your nurse or doctor.               Start taking these medicines.        Dose/Directions    acetaminophen-codeine 300-30 MG per tablet   Commonly known as:  TYLENOL #3   Used for:  S/P ICD (internal cardiac defibrillator) procedure   Started by:  Bertha Tony APRN CNP        Dose:  1-2 tablet   Take 1-2 tablets by mouth every 4 hours as needed for moderate pain   Quantity:  28 tablet   Refills:  0 "         These medicines have changed or have updated prescriptions.        Dose/Directions    lisinopril 2.5 MG tablet   Commonly known as:  PRINIVIL/Zestril   This may have changed:  how much to take   Used for:  Ischemic cardiomyopathy   Changed by:  Bertha Tony APRN CNP        Dose:  5 mg   Take 2 tablets (5 mg) by mouth daily   Quantity:  30 tablet   Refills:  1            Where to get your medicines      These medications were sent to [facility]/pharmacy #[id number] - [facility] [address] [address]     Phone:  [phone]     lisinopril 2.5 MG tablet         Some of these will need a paper prescription and others can be bought over the counter.  Ask your nurse if you have questions.     Bring a paper prescription for each of these medications     acetaminophen-codeine 300-30 MG per tablet                Primary Care Provider Office Phone # Fax #    Shanna Church [phone]       [address] [facility] [address]        Equal Access to Services     DALTON AYALA AH: Hadii aad ku hadasho Soomaali, waaxda luqadaha, qaybta kaalmada adeegyada, waxay idiin hayaan shabbir nettles . So [facility] [phone].    ATENCIÓN: Si habla español, tiene a suarez disposición servicios gratuitos de asistencia lingüística. [facility] [phone].    We comply with applicable federal civil rights laws and [place] laws. We do not discriminate on the basis of race, color, national origin, age, disability, sex, sexual orientation, or gender identity.            Thank you!     Thank you for choosing [facility]  for your care. Our goal is always to provide you with excellent care. Hearing back from our patients is one way we can continue to improve our services. Please take a few minutes to complete the written survey that you may receive in the mail after your visit with us. Thank you!             Your Updated Medication List - Protect others around you:  Learn how to safely use, store and throw away your medicines at www.disposemymeds.org.          This list is accurate as of: 11/8/17 12:02 PM.  Always use your most recent med list.                   Brand Name Dispense Instructions for use Diagnosis    acetaminophen 500 MG tablet    TYLENOL    30 tablet    Take 2 tablets (1,000 mg) by mouth every 6 hours as needed for mild pain    Lower extremity pain, bilateral, Shakiness       acetaminophen-codeine 300-30 MG per tablet    TYLENOL #3    28 tablet    Take 1-2 tablets by mouth every 4 hours as needed for moderate pain    S/P ICD (internal cardiac defibrillator) procedure       alcohol swab prep pads     100 each    Use to swab area of injection/mary alice as directed 3 x per day        ascorbic acid 500 MG Tabs     2 tablet    Take 1 tablet (500 mg) by mouth daily    Coronary artery disease involving native coronary artery of native heart without angina pectoris       ASPIRIN NOT PRESCRIBED    INTENTIONAL    0 each    Please choose reason not prescribed, below    Cardiogenic shock (H), Type 2 diabetes mellitus without complication, with long-term current use of insulin (H)       atorvastatin 40 MG tablet    LIPITOR    60 tablet    1 tablet (40 mg) by Oral or Feeding Tube route daily    Coronary artery disease involving native coronary artery of native heart without angina pectoris, Cardiogenic shock (H)       baclofen 10 MG tablet    LIORESAL    90 tablet    Take 1 tablet (10 mg) by mouth 3 times daily    Lower limb symptom       beta carotene 99363 UNIT capsule     2 capsule    Take 1 capsule (25,000 Units) by mouth daily    Ischemic cardiomyopathy       blood glucose monitoring test strip    no brand specified    100 strip    Use to test blood sugar 3 times daily or as directed.    Diabetes mellitus type 2, insulin dependent (H)       bumetanide 0.5 MG tablet    BUMEX    120 tablet    Take 1 tablet (0.5 mg) by mouth daily    Ischemic cardiomyopathy       cephalexin 500  MG tablet     21 tablet    Take 500 mg by mouth 3 times daily for 7 days    S/P ICD (internal cardiac defibrillator) procedure       cetirizine 10 MG tablet    zyrTEC    60 tablet    Take 1 tablet (10 mg) by mouth daily    Seasonal allergic rhinitis       clopidogrel 75 MG tablet    PLAVIX    60 tablet    Take 1 tablet (75 mg) by mouth daily    ST elevation myocardial infarction (STEMI), unspecified artery (H)       digoxin 125 MCG tablet    LANOXIN    60 tablet    Take 1 tablet (125 mcg) by mouth daily    Coronary artery disease involving native coronary artery of native heart without angina pectoris, Cardiogenic shock (H)       finasteride 5 MG tablet    PROSCAR    60 tablet    Take 1 tablet (5 mg) by mouth daily    Hypertrophy of prostate with urinary obstruction       insulin glargine 100 UNIT/ML injection    LANTUS    18 mL    Inject 25 Units Subcutaneous every morning (before breakfast)        insulin pen needle 32G X 4 MM    BD KYLEE U/F    100 each    Use 3 daily or as directed.    Diabetes mellitus type 2, insulin dependent (H)       lisinopril 2.5 MG tablet    PRINIVIL/Zestril    30 tablet    Take 2 tablets (5 mg) by mouth daily    Ischemic cardiomyopathy       METOPROLOL SUCCINATE ER PO      Take 25 mg by mouth daily        oxyCODONE-acetaminophen 5-325 MG per tablet    PERCOCET    28 tablet    Take 1 tablet by mouth every 4 hours as needed for other (mild or moderate pain)    S/P ICD (internal cardiac defibrillator) procedure       pantoprazole 40 MG EC tablet    PROTONIX    60 tablet    Take 1 tablet (40 mg) by mouth daily    Gastrointestinal hemorrhage associated with other gastritis       potassium chloride SA 20 MEQ CR tablet    K-DUR/KLOR-CON M    120 tablet    Take 1 tablet (20 mEq) by mouth 2 times daily    Ischemic cardiomyopathy       Sharps Container Misc     1 each    1 each every 30 days    Diabetes mellitus type 2, insulin dependent (H)       tamsulosin 0.4 MG capsule    FLOMAX    60 capsule     Take 1 capsule (0.4 mg) by mouth daily    Ischemic cardiomyopathy       * Warfarin Therapy Reminder      1 each continuous prn    Deep vein thrombosis (DVT) of left lower extremity, unspecified chronicity, unspecified vein (H)       * warfarin 3 MG tablet    COUMADIN    100 tablet    Take one tablet daily as directed by the [facility]    Deep vein thrombosis (DVT) of left lower extremity, unspecified chronicity, unspecified vein (H)       zinc sulfate 220 (50 ZN) MG capsule    ZINCATE    2 capsule    Take 1 capsule (220 mg) by mouth daily    Ischemic cardiomyopathy       * Notice:  This list has 2 medication(s) that are the same as other medications prescribed for you. Read the directions carefully, and ask your doctor or other care provider to review them with you.

## 2017-11-08 NOTE — PATIENT INSTRUCTIONS
Plan:    Increase Lisinopril to 5 mg daily    Have lab draw in 1 week    Follow up in 1 week    Cardiovascular Clinic:   [address] [place] [address]. [place] [address]  Your Care Team:  [facility]   Telephone Number     Bertha Rich NP   [phone]   Ivana Vitale RN  Roseline Day RN  [phone]     For scheduling appts or procedures:    Gladys Villanueva   [phone]   For the Device Clinic (Pacemakers and ICD's)   RN's :   Mony Stuart  During business hours: [phone]     After business hours:   [phone]- select option 4 and ask for job code [id number].       As always, Thank you for trusting us with your health care needs!

## 2017-11-08 NOTE — NURSING NOTE
Chief Complaint   Patient presents with     Follow Up For     hematoma check.     Vitals were taken and medications were reconciled.     Delaney Soria,RMA  11:00 AM

## 2017-11-13 ENCOUNTER — ANTICOAGULATION THERAPY VISIT (OUTPATIENT)
Dept: ANTICOAGULATION | Facility: CLINIC | Age: 52
End: 2017-11-13
Payer: COMMERCIAL

## 2017-11-13 DIAGNOSIS — E11.9 DIABETES MELLITUS TYPE 2, INSULIN DEPENDENT (H): ICD-10-CM

## 2017-11-13 DIAGNOSIS — Z79.01 LONG-TERM (CURRENT) USE OF ANTICOAGULANTS: ICD-10-CM

## 2017-11-13 DIAGNOSIS — Z79.4 DIABETES MELLITUS TYPE 2, INSULIN DEPENDENT (H): ICD-10-CM

## 2017-11-13 LAB — INR POINT OF CARE: 3.8 (ref 0.86–1.14)

## 2017-11-13 PROCEDURE — 85610 PROTHROMBIN TIME: CPT | Mod: QW

## 2017-11-13 PROCEDURE — 36416 COLLJ CAPILLARY BLOOD SPEC: CPT

## 2017-11-13 NOTE — PROGRESS NOTES
ANTICOAGULATION FOLLOW-UP CLINIC VISIT    Patient Name:  Luke Henao  Date:  11/13/2017  Contact Type:  Face to Face    SUBJECTIVE:        OBJECTIVE    INR Protime   Date Value Ref Range Status   11/13/2017 3.8 (A) 0.86 - 1.14 Final       ASSESSMENT / PLAN  INR assessment SUPRA    Recheck INR In: 1 WEEK    INR Location Clinic      Anticoagulation Summary as of 11/13/2017     INR goal 2.0-3.0   Today's INR 3.8!   Maintenance plan 1.5 mg (3 mg x 0.5) on Mon, Wed, Fri; 3 mg (3 mg x 1) all other days   Full instructions 11/13: Hold; 11/15: 1.5 mg; 11/17: 1.5 mg; Otherwise 1.5 mg on Mon, Wed, Fri; 3 mg all other days   Weekly total 16.5 mg   Plan last modified Pina Coyle RN (11/13/2017)   Next INR check 11/20/2017   Target end date     Indications   Cerebrovascular accident (CVA) due to thrombosis of cerebral artery (H) (Resolved) [I63.30]  DVT (deep venous thrombosis) (H) [I82.409]  Long-term (current) use of anticoagulants [Z79.01] [Z79.01]         Anticoagulation Episode Summary     INR check location [facility]    Preferred lab     Send INR reminders to  [facility]    Comments             See the Encounter Report to view Anticoagulation Flowsheet and Dosing Calendar (Go to Encounters tab in chart review, and find the Anticoagulation Therapy Visit)    Dosage adjustment made based on physician directed care plan.    Pina Coyle RN

## 2017-11-13 NOTE — TELEPHONE ENCOUNTER
Test strips      Last Written Prescription Date: 9/19/17  Last Fill Quantity: 100,  # refills: 0   Last Office Visit with G, P or [facility] prescribing provider: 9/26/17

## 2017-11-14 ENCOUNTER — OFFICE VISIT (OUTPATIENT)
Dept: CARDIOLOGY | Facility: CLINIC | Age: 52
End: 2017-11-14
Attending: NURSE PRACTITIONER
Payer: COMMERCIAL

## 2017-11-14 VITALS
OXYGEN SATURATION: 100 % | HEIGHT: 62 IN | WEIGHT: 121 LBS | SYSTOLIC BLOOD PRESSURE: 104 MMHG | BODY MASS INDEX: 22.26 KG/M2 | HEART RATE: 92 BPM | DIASTOLIC BLOOD PRESSURE: 71 MMHG

## 2017-11-14 DIAGNOSIS — Z95.810 S/P ICD (INTERNAL CARDIAC DEFIBRILLATOR) PROCEDURE: Primary | ICD-10-CM

## 2017-11-14 DIAGNOSIS — I25.5 ISCHEMIC CARDIOMYOPATHY: ICD-10-CM

## 2017-11-14 LAB
ANION GAP SERPL CALCULATED.3IONS-SCNC: 9 MMOL/L (ref 3–14)
BUN SERPL-MCNC: 30 MG/DL (ref 7–30)
CALCIUM SERPL-MCNC: 8.6 MG/DL (ref 8.5–10.1)
CHLORIDE SERPL-SCNC: 104 MMOL/L (ref 94–109)
CO2 SERPL-SCNC: 25 MMOL/L (ref 20–32)
CREAT SERPL-MCNC: 1.5 MG/DL (ref 0.66–1.25)
GFR SERPL CREATININE-BSD FRML MDRD: 49 ML/MIN/1.7M2
GLUCOSE SERPL-MCNC: 144 MG/DL (ref 70–99)
POTASSIUM SERPL-SCNC: 4.1 MMOL/L (ref 3.4–5.3)
SODIUM SERPL-SCNC: 138 MMOL/L (ref 133–144)

## 2017-11-14 PROCEDURE — 99213 OFFICE O/P EST LOW 20 MIN: CPT | Mod: 24 | Performed by: NURSE PRACTITIONER

## 2017-11-14 PROCEDURE — 80048 BASIC METABOLIC PNL TOTAL CA: CPT | Performed by: NURSE PRACTITIONER

## 2017-11-14 PROCEDURE — 99212 OFFICE O/P EST SF 10 MIN: CPT | Mod: ZF

## 2017-11-14 PROCEDURE — 36415 COLL VENOUS BLD VENIPUNCTURE: CPT | Performed by: NURSE PRACTITIONER

## 2017-11-14 ASSESSMENT — PAIN SCALES - GENERAL: PAINLEVEL: NO PAIN (0)

## 2017-11-14 NOTE — PROGRESS NOTES
Clinical Cardiac Electrophysiology    Chief Complaint:  Hematoma of the ICD pocket    HPI: Luke Henao is a 52 year old male with a past medical history significant for for tobacco use, DM, and CAD s/p PCI. He presented to [facility] 03/26/17 with RCA STEMI transferred to [facility] after POBA, for further workup and management of cardiogenic shock, which led to a 6-week hospital stay  (3/29/17-5/12/17), revascularization with  DESx7 to RCA (culprit), LCx (, now closed) and LAD on 3/27, with refractory cardiogenic shock requiring intraaortic balloon pump, eventually subclavian balloon pump, multiple episodes of PEDRO, sepsis, sacral ulcer, DVT on a/c, b/l hemispheric infarcts likely due to watershed ischemia, eventually refractory CS was attributed to ischemic MR on iCM (EF25-30%), and pt was treated with percutaneous MV repair with MitraClip (5/1/17), which led to reduction in MR from severe to mild, and substantial clinic improvement, weaning of IABP, and eventual discharge to rehabilitation, where pt stayed from 5/12/17 -5/26/17. It appears that he was on lisinopril up until a visit with his primary care physician Dr. Church on 9/26/2017, at which time it was discontinued due to history of PEDRO.  Nonetheless, his LVEF has not recovered from 30-35% in 3/2017 despite treatment with ACE inhibition and beta blockade following revascularization. Decision was made to pursue ICD. He underwent ICD implant on 10/26/17. He was noted to have small size hematoma post implant at discharge next day.   At one week check, he was noted to have a large hematoma, 12 x 14 x 3 cm, that was tender to the touch and firm.  There is no redness or drainage. He also had a rash isolated to the area that were exposed to the chloroprep. He was scheduled for close follow up.   11/8/2017  He presented for follow up. Hematoma occurred in the setting of supra therapeutic INRs. He reports that he has some  bloody drainage from the medial aspect of incision starting on 11/5/17. He went to Urgent care where they cleansed site, placed dermabond on, and loose guaze dressing over the top. Incision check today is stable. No further drainage from incision since 11/5. Incision is not taught and is not under tension. Sit is still slightly ecchymotic and firm. He denies any fevers, chills, or purulent drainage from the site.  He is noted to be sightly hypertensive today. Current cardiac medications include: Lipitor, Digoxin, Plavix, Lisinopril, Metoprolol, and Warfarin.    11/14/2017  Today he presents for follow up of hematoma.  States the drainage stopped on 11/13, denies fevers, chills, pain.  He is currently working with no limitations.   Denies chest pain or pressure, headaches, dizziness, syncope, angina, dyspnea at rest or with exertion, dry cough, palpitations, orthopnea, PND, abdominal pain, abdominal edema, pedal edema, or claudication.  Denies easy bruising or bleeding, hematuria, hematochezia, and epistaxis. Denies signs/symptoms of stroke such as visual disturbance, difficulty speaking, facial drooping, confusion, problems with gait, or any new numbness or weakness.  His last dose of antibiotics was on 11/13/2017 and INR was supratherautic on 3.8          Current Outpatient Prescriptions   Medication Sig Dispense Refill     ONETOUCH ULTRA test strip USE TO TEST BLOOD SUGAR 3 TIMES DAILY OR AS DIRECTED. 100 strip 3     lisinopril (PRINIVIL/ZESTRIL) 2.5 MG tablet Take 2 tablets (5 mg) by mouth daily 30 tablet 1     acetaminophen-codeine (TYLENOL #3) 300-30 MG per tablet Take 1-2 tablets by mouth every 4 hours as needed for moderate pain 28 tablet 0     METOPROLOL SUCCINATE ER PO Take 25 mg by mouth daily       oxyCODONE-acetaminophen (PERCOCET) 5-325 MG per tablet Take 1 tablet by mouth every 4 hours as needed for other (mild or moderate pain) 28 tablet 0     warfarin (COUMADIN) 3 MG tablet Take one tablet daily as  directed by the  tablet 0     digoxin (LANOXIN) 125 MCG tablet Take 1 tablet (125 mcg) by mouth daily 60 tablet 5     insulin pen needle (BD KYLEE U/F) 32G X 4 MM Use 3 daily or as directed. 100 each prn     bumetanide (BUMEX) 0.5 MG tablet Take 1 tablet (0.5 mg) by mouth daily 120 tablet 0     alcohol swab prep pads Use to swab area of injection/mary alice as directed 3 x per day 100 each 11     insulin glargine (LANTUS) 100 UNIT/ML injection Inject 25 Units Subcutaneous every morning (before breakfast) 18 mL 3     atorvastatin (LIPITOR) 40 MG tablet 1 tablet (40 mg) by Oral or Feeding Tube route daily 60 tablet 7     cetirizine (ZYRTEC) 10 MG tablet Take 1 tablet (10 mg) by mouth daily 60 tablet 11     clopidogrel (PLAVIX) 75 MG tablet Take 1 tablet (75 mg) by mouth daily 60 tablet 11     finasteride (PROSCAR) 5 MG tablet Take 1 tablet (5 mg) by mouth daily 60 tablet 11     pantoprazole (PROTONIX) 40 MG EC tablet Take 1 tablet (40 mg) by mouth daily 60 tablet 11     potassium chloride SA (K-DUR/KLOR-CON M) 20 MEQ CR tablet Take 1 tablet (20 mEq) by mouth 2 times daily 120 tablet 11     baclofen (LIORESAL) 10 MG tablet Take 1 tablet (10 mg) by mouth 3 times daily 90 tablet 3     acetaminophen (TYLENOL) 500 MG tablet Take 2 tablets (1,000 mg) by mouth every 6 hours as needed for mild pain 30 tablet 0     ASPIRIN NOT PRESCRIBED (INTENTIONAL) Please choose reason not prescribed, below 0 each 0     ascorbic acid 500 MG TABS Take 1 tablet (500 mg) by mouth daily 2 tablet 0     beta carotene 89593 UNIT capsule Take 1 capsule (25,000 Units) by mouth daily 2 capsule 0     tamsulosin (FLOMAX) 0.4 MG capsule Take 1 capsule (0.4 mg) by mouth daily 60 capsule 0     zinc sulfate (ZINCATE) 220 (50 ZN) MG capsule Take 1 capsule (220 mg) by mouth daily 2 capsule 0     Sharps Container MISC 1 each every 30 days 1 each 0     Warfarin Therapy Reminder 1 each continuous prn         Past Medical History:   Diagnosis Date     CAD  (coronary artery disease) 2017    RCA STEMI s/p balloon angioplasty and multiple Sofie to RCA, LCx, and LAD     DVT (deep venous thrombosis) (H) 2017    left internal jugular (line-associated); left common femoral; on coumadin     Ischemic cardiomyopathy 2017    EF 30-35%     Ischemic stroke (H) 2017    no residual deficits     Lower GI bleed 2017    due to rectal ulcer     Mitral valve insufficiency, ischemic 2017    s/p Mitral Clip placement      Type 2 diabetes mellitus (H)        Past Surgical History:   Procedure Laterality Date     C INSERT ELECTRD LEADS/REPOSTION  10/27/2017          COLONOSCOPY N/A 4/17/2017    Procedure: COLONOSCOPY;  Surgeon: Rashaad Bundy MD;  Location: [facility]     INSERT INTRAAORTIC BALLOON PUMP Right 4/19/2017    Procedure: INSERT INTRAAORTIC BALLOON PUMP;  Right Subclavian Intra Aortic Balloon Pump Insertion using Maquet 40cc Ballon Catheter, Implentation of 8mm Gelweave Woven Vascular Prosthesis, Removal of Left Femoral Ballon Pump Catheter, Flouroscopy;  Surgeon: Keshav Leung MD;  Location: [facility]     PERCUTANEOUS MITRAL VALVE REPAIR N/A 5/1/2017    Procedure: PERCUTANEOUS MITRAL VALVE REPAIR ANESTHESIA;  Mitraclip Procedure Possible Cardiopulmonary Bypass ;  Surgeon: Ron Cortez MD;  Location: [facility]     SUBCLAVIAN AORTIC VALVE IMPLANT N/A 5/8/2017    Procedure: SUBCLAVIAN AORTIC VALVE IMPLANT;  Right Subclavian Graft Removal ;  Surgeon: Keshav Leung MD;  Location: [facility] OR       Family History   Problem Relation Age of Onset     Hypertension Mother      Type 2 Diabetes Father      Hypertension Father      Myocardial Infarction No family hx of      CEREBROVASCULAR DISEASE No family hx of      Coronary Artery Disease Early Onset No family hx of      Colon Cancer No family hx of      Prostate Cancer No family hx of        Social History   Substance Use Topics     Smoking status: Former Smoker     Packs/day: 0.50     Years: 30.00     Types: Cigarettes  "    Quit date: 2017     Smokeless tobacco: Never Used     Alcohol use No       No Known Allergies      ROS:   Negative except for as indicated in HPI.    Physical Examination:  Vitals: /71 (BP Location: Right arm, Patient Position: Chair, Cuff Size: Adult Regular)  Pulse 92  Ht 1.575 m (5' 2\")  Wt 54.9 kg (121 lb)  SpO2 100%  BMI 22.13 kg/m2  BMI= Body mass index is 22.13 kg/(m^2).    GENERAL APPEARANCE:thin  male who is alert, and no acute distress  HEENT: no icterus, no xanthelasmas, normal pupil size and reaction, no cyanosis.  NECK: no asymmetry, no cervical bruits, no JVD   CHEST: lungs clear to auscultation - no rales, rhonchi or wheezes, no use of accessory muscles, no retractions, respirations are unlabored, normal respiratory rate.  Left ICD pocket incision closed with dermabond;  hematoma measuring 10*11.5*2.5 cm.  Slightly ecchymotic.  No drainage, continues to be covered with dermabond with some old blood and a steristrip on the medial side of the incision.    CARDIOVASCULAR: regular rhythm, normal S1 with physiologic split S2, no S3 or S4 and no murmur, click or rub, precordium quiet with normal PMI.  ABDOMEN: soft, non-tender, bowel sounds normal  EXTREMITIES: no clubbing, cyanosis, or edema  NEURO: alert and oriented to person/place/time, normal speech, gait and affect  VASC: Radial and posterior tibialis pulses +2 and symmetric bilaterally.   SKIN: no ecchymoses, no rashes    Laboratory:  BMP@LABRCNTIPR(na:4,potassium:4,chloride:4,hugo:4,co2:4,bun:4,cr:4,g)@  CBC@LABRCNTIPR(wbc:4,rbc:4,hgb:4,hct:4,mcv:4,mch:4,mchc:4,rdw:4,plt:4)@  INR@LABRCNTIPR(inr:4)@  No results found for: CKTOTAL, CKMB, TROPN  Cholesterol (mg/dL)   Date Value   2017 128     HDL Cholesterol (mg/dL)   Date Value   2017 36 (L)     LDL Cholesterol Calculated (mg/dL)   Date Value   2017 77       ECHO:         Assessment and recommendations:  ICD pocket hematoma    - patient is feeling well and " denies fevers, chills, or pain  - Left ICD pocket incision closed with dermabond;  hematoma measuring 10*11.5*2.5 cm.  Slightly ecchymotic.  No drainage, continues to be covered with dermabond with some old blood and a steristrip on the medial side of the incision.  A  - at this time will not extend antibiotics.    - encouraged patient to maintain INR between 2-3  - follow up in device clinic on 11/30  - instructed patient to not scrub area but he can shower and allow the steristrip and dermabond to fall off in the shower   - continue with regularly schedule medications.      Patient expresses understanding and agreement with the plan.    I [facility] the chance to help [facility]. Please let me know if you have any questions or concerns.    Lucila VENEGAS, CNP

## 2017-11-14 NOTE — NURSING NOTE
Chief Complaint   Patient presents with     Follow Up For     hematoma check.     Vitals were taken and medications were reconciled.   Delaney Soria,RMA  7:56 AM

## 2017-11-14 NOTE — PATIENT INSTRUCTIONS
Cardiology Provider you saw in clinic today: RUBI Roblero, CNP    Medication Changes:  No changes today.   Continue with your regular medications    Labs/Tests needed:  LAbs before you see BERLIN Diaz     Follow-up Visit:  November 30    Further Instructions:   Continue to monitor if your have fevers, drainage from your site, or pain.  Please call us if this occurs    You will receive all normal lab and testing results via [employer] or mail if not reviewed in clinic today. Please contact our office if you need assistance with setting up MyChart.    If you need a medication refill please contact your pharmacy. Please allow 3 business days for your refill to be completed.     As always, thank you for trusting us with your health care needs!    If you have any questions regarding your visit please contact your care team:   Cardiology  Telephone Number    EP RN  Electrophysiology Nurse Coordinator [phone]     Call for EP procedure scheduling concerns  MONO Wilson  [phone]           Device Clinic (Pacemakers, ICDs, Loop)   RN's : Bertha Sykes Connie, Dawn During business hours: [phone]    After business hours:   [phone]- select option 4 and ask for job code [id number].

## 2017-11-14 NOTE — LETTER
11/14/2017      RE: Luke Henao  [address]  [place] [address]       Dear Colleague,    Thank you for the opportunity to participate in the care of your patient, Luke Henao, at the [facility] HEART [facility]. Please see a copy of my visit note below.    Clinical Cardiac Electrophysiology    Chief Complaint:  Hematoma of the ICD pocket    HPI: Luke Henao is a 52 year old male with a past medical history significant for for tobacco use, DM, and CAD s/p PCI. He presented to [facility] 03/26/17 with RCA STEMI transferred to [facility] after POBA, for further workup and management of cardiogenic shock, which led to a 6-week hospital stay  (3/29/17-5/12/17), revascularization with  DESx7 to RCA (culprit), LCx (, now closed) and LAD on 3/27, with refractory cardiogenic shock requiring intraaortic balloon pump, eventually subclavian balloon pump, multiple episodes of PEDRO, sepsis, sacral ulcer, DVT on a/c, b/l hemispheric infarcts likely due to watershed ischemia, eventually refractory CS was attributed to ischemic MR on iCM (EF25-30%), and pt was treated with percutaneous MV repair with MitraClip (5/1/17), which led to reduction in MR from severe to mild, and substantial clinic improvement, weaning of IABP, and eventual discharge to rehabilitation, where pt stayed from 5/12/17 -5/26/17. It appears that he was on lisinopril up until a visit with his primary care physician Dr. Church on 9/26/2017, at which time it was discontinued due to history of PEDRO.  Nonetheless, his LVEF has not recovered from 30-35% in 3/2017 despite treatment with ACE inhibition and beta blockade following revascularization. Decision was made to pursue ICD. He underwent ICD implant on 10/26/17. He was noted to have small size hematoma post implant at discharge next day.   At one week check, he was noted to have a large hematoma, 12 x 14 x 3 cm, that was  tender to the touch and firm.  There is no redness or drainage. He also had a rash isolated to the area that were exposed to the chloroprep. He was scheduled for close follow up.   11/8/2017  He presented for follow up. Hematoma occurred in the setting of supra therapeutic INRs. He reports that he has some bloody drainage from the medial aspect of incision starting on 11/5/17. He went to Urgent care where they cleansed site, placed dermabond on, and loose guaze dressing over the top. Incision check today is stable. No further drainage from incision since 11/5. Incision is not taught and is not under tension. Sit is still slightly ecchymotic and firm. He denies any fevers, chills, or purulent drainage from the site.  He is noted to be sightly hypertensive today. Current cardiac medications include: Lipitor, Digoxin, Plavix, Lisinopril, Metoprolol, and Warfarin.    11/14/2017  Today he presents for follow up of hematoma.  States the drainage stopped on 11/13, denies fevers, chills, pain.  He is currently working with no limitations.   Denies chest pain or pressure, headaches, dizziness, syncope, angina, dyspnea at rest or with exertion, dry cough, palpitations, orthopnea, PND, abdominal pain, abdominal edema, pedal edema, or claudication.  Denies easy bruising or bleeding, hematuria, hematochezia, and epistaxis. Denies signs/symptoms of stroke such as visual disturbance, difficulty speaking, facial drooping, confusion, problems with gait, or any new numbness or weakness.  His last dose of antibiotics was on 11/13/2017 and INR was supratherautic on 3.8          Current Outpatient Prescriptions   Medication Sig Dispense Refill     ONETOUCH ULTRA test strip USE TO TEST BLOOD SUGAR 3 TIMES DAILY OR AS DIRECTED. 100 strip 3     lisinopril (PRINIVIL/ZESTRIL) 2.5 MG tablet Take 2 tablets (5 mg) by mouth daily 30 tablet 1     acetaminophen-codeine (TYLENOL #3) 300-30 MG per tablet Take 1-2 tablets by mouth every 4 hours as  needed for moderate pain 28 tablet 0     METOPROLOL SUCCINATE ER PO Take 25 mg by mouth daily       oxyCODONE-acetaminophen (PERCOCET) 5-325 MG per tablet Take 1 tablet by mouth every 4 hours as needed for other (mild or moderate pain) 28 tablet 0     warfarin (COUMADIN) 3 MG tablet Take one tablet daily as directed by the  tablet 0     digoxin (LANOXIN) 125 MCG tablet Take 1 tablet (125 mcg) by mouth daily 60 tablet 5     insulin pen needle (BD KYLEE U/F) 32G X 4 MM Use 3 daily or as directed. 100 each prn     bumetanide (BUMEX) 0.5 MG tablet Take 1 tablet (0.5 mg) by mouth daily 120 tablet 0     alcohol swab prep pads Use to swab area of injection/mary alice as directed 3 x per day 100 each 11     insulin glargine (LANTUS) 100 UNIT/ML injection Inject 25 Units Subcutaneous every morning (before breakfast) 18 mL 3     atorvastatin (LIPITOR) 40 MG tablet 1 tablet (40 mg) by Oral or Feeding Tube route daily 60 tablet 7     cetirizine (ZYRTEC) 10 MG tablet Take 1 tablet (10 mg) by mouth daily 60 tablet 11     clopidogrel (PLAVIX) 75 MG tablet Take 1 tablet (75 mg) by mouth daily 60 tablet 11     finasteride (PROSCAR) 5 MG tablet Take 1 tablet (5 mg) by mouth daily 60 tablet 11     pantoprazole (PROTONIX) 40 MG EC tablet Take 1 tablet (40 mg) by mouth daily 60 tablet 11     potassium chloride SA (K-DUR/KLOR-CON M) 20 MEQ CR tablet Take 1 tablet (20 mEq) by mouth 2 times daily 120 tablet 11     baclofen (LIORESAL) 10 MG tablet Take 1 tablet (10 mg) by mouth 3 times daily 90 tablet 3     acetaminophen (TYLENOL) 500 MG tablet Take 2 tablets (1,000 mg) by mouth every 6 hours as needed for mild pain 30 tablet 0     ASPIRIN NOT PRESCRIBED (INTENTIONAL) Please choose reason not prescribed, below 0 each 0     ascorbic acid 500 MG TABS Take 1 tablet (500 mg) by mouth daily 2 tablet 0     beta carotene 21092 UNIT capsule Take 1 capsule (25,000 Units) by mouth daily 2 capsule 0     tamsulosin (FLOMAX) 0.4 MG capsule Take 1  capsule (0.4 mg) by mouth daily 60 capsule 0     zinc sulfate (ZINCATE) 220 (50 ZN) MG capsule Take 1 capsule (220 mg) by mouth daily 2 capsule 0     Sharps Container MISC 1 each every 30 days 1 each 0     Warfarin Therapy Reminder 1 each continuous prn         Past Medical History:   Diagnosis Date     CAD (coronary artery disease) 2017    RCA STEMI s/p balloon angioplasty and multiple Sofie to RCA, LCx, and LAD     DVT (deep venous thrombosis) (H) 2017    left internal jugular (line-associated); left common femoral; on coumadin     Ischemic cardiomyopathy 2017    EF 30-35%     Ischemic stroke (H) 2017    no residual deficits     Lower GI bleed 2017    due to rectal ulcer     Mitral valve insufficiency, ischemic 2017    s/p Mitral Clip placement      Type 2 diabetes mellitus (H)        Past Surgical History:   Procedure Laterality Date     C INSERT ELECTRD LEADS/REPOSTION  10/27/2017          COLONOSCOPY N/A 4/17/2017    Procedure: COLONOSCOPY;  Surgeon: Rashaad Bundy MD;  Location: [facility]     INSERT INTRAAORTIC BALLOON PUMP Right 4/19/2017    Procedure: INSERT INTRAAORTIC BALLOON PUMP;  Right Subclavian Intra Aortic Balloon Pump Insertion using Maquet 40cc Ballon Catheter, Implentation of 8mm Gelweave Woven Vascular Prosthesis, Removal of Left Femoral Ballon Pump Catheter, Flouroscopy;  Surgeon: Keshav Leung MD;  Location: [facility]     PERCUTANEOUS MITRAL VALVE REPAIR N/A 5/1/2017    Procedure: PERCUTANEOUS MITRAL VALVE REPAIR ANESTHESIA;  Mitraclip Procedure Possible Cardiopulmonary Bypass ;  Surgeon: Ron Cortez MD;  Location: [facility]     SUBCLAVIAN AORTIC VALVE IMPLANT N/A 5/8/2017    Procedure: SUBCLAVIAN AORTIC VALVE IMPLANT;  Right Subclavian Graft Removal ;  Surgeon: Keshav Leung MD;  Location: [facility]       Family History   Problem Relation Age of Onset     Hypertension Mother      Type 2 Diabetes Father      Hypertension Father      Myocardial Infarction No family hx of   "    CEREBROVASCULAR DISEASE No family hx of      Coronary Artery Disease Early Onset No family hx of      Colon Cancer No family hx of      Prostate Cancer No family hx of        Social History   Substance Use Topics     Smoking status: Former Smoker     Packs/day: 0.50     Years: 30.00     Types: Cigarettes     Quit date: 2017     Smokeless tobacco: Never Used     Alcohol use No       No Known Allergies      ROS:   Negative except for as indicated in HPI.    Physical Examination:  Vitals: /71 (BP Location: Right arm, Patient Position: Chair, Cuff Size: Adult Regular)  Pulse 92  Ht 1.575 m (5' 2\")  Wt 54.9 kg (121 lb)  SpO2 100%  BMI 22.13 kg/m2  BMI= Body mass index is 22.13 kg/(m^2).    GENERAL APPEARANCE:thin  male who is alert, and no acute distress  HEENT: no icterus, no xanthelasmas, normal pupil size and reaction, no cyanosis.  NECK: no asymmetry, no cervical bruits, no JVD   CHEST: lungs clear to auscultation - no rales, rhonchi or wheezes, no use of accessory muscles, no retractions, respirations are unlabored, normal respiratory rate.  Left ICD pocket incision closed with dermabond;  hematoma measuring 10*11.5*2.5 cm.  Slightly ecchymotic.  No drainage, continues to be covered with dermabond with some old blood and a steristrip on the medial side of the incision.    CARDIOVASCULAR: regular rhythm, normal S1 with physiologic split S2, no S3 or S4 and no murmur, click or rub, precordium quiet with normal PMI.  ABDOMEN: soft, non-tender, bowel sounds normal  EXTREMITIES: no clubbing, cyanosis, or edema  NEURO: alert and oriented to person/place/time, normal speech, gait and affect  VASC: Radial and posterior tibialis pulses +2 and symmetric bilaterally.   SKIN: no ecchymoses, no rashes    Laboratory:  BMP@LABRCNTIPR(na:4,potassium:4,chloride:4,hugo:4,co2:4,bun:4,cr:4,g)@  CBC@LABRCNTIPR(wbc:4,rbc:4,hgb:4,hct:4,mcv:4,mch:4,mchc:4,rdw:4,plt:4)@  INR@LABRCNTIPR(inr:4)@  No results found " for: CKTOTAL, CKMB, TROPN  Cholesterol (mg/dL)   Date Value   03/27/2017 128     HDL Cholesterol (mg/dL)   Date Value   03/27/2017 36 (L)     LDL Cholesterol Calculated (mg/dL)   Date Value   03/27/2017 77       ECHO:         Assessment and recommendations:  ICD pocket hematoma    - patient is feeling well and denies fevers, chills, or pain  - Left ICD pocket incision closed with dermabond;  hematoma measuring 10*11.5*2.5 cm.  Slightly ecchymotic.  No drainage, continues to be covered with dermabond with some old blood and a steristrip on the medial side of the incision.  A  - at this time will not extend antibiotics.    - encouraged patient to maintain INR between 2-3  - follow up in device clinic on 11/30  - instructed patient to not scrub area but he can shower and allow the steristrip and dermabond to fall off in the shower   - continue with regularly schedule medications.      Patient expresses understanding and agreement with the plan.    I appreciate the chance to help with [facility] care. Please let me know if you have any questions or concerns.    Lucila VENEGAS, CNP

## 2017-11-14 NOTE — MR AVS SNAPSHOT
After Visit Summary   11/14/2017    Luke Henao    MRN: [id number]           Patient Information     Date Of Birth          1965        Visit Information        Provider Department      11/14/2017 8:45 AM Open, Assignments; Lucila Delgado APRN CNP [facility]        Care Instructions    Cardiology Provider you saw in clinic today: RUBI Roblero CNP    Medication Changes:  No changes today.   Continue with your regular medications    Labs/Tests needed:  LAbs before you see BERLIN Diaz     Follow-up Visit:  November 30    Further Instructions:   Continue to monitor if your have fevers, drainage from your site, or pain.  Please call us if this occurs    You will receive all normal lab and testing results via [employer] or mail if not reviewed in clinic today. Please contact our office if you need assistance with setting up [employer].    If you need a medication refill please contact your pharmacy. Please allow 3 business days for your refill to be completed.     As always, thank you for trusting us with your health care needs!    If you have any questions regarding your visit please contact your care team:   Cardiology  Telephone Number    EP RN  Electrophysiology Nurse Coordinator [phone]     Call for EP procedure scheduling concerns  MONO Wilson  [phone]           Device Clinic (Pacemakers, ICDs, Loop)   RN's : Bertha Sykes Connie, Dawn During business hours: [phone]    After business hours:   [phone]- select option 4 and ask for job code [id number].                  Follow-ups after your visit        Your next 10 appointments already scheduled     Nov 14, 2017  8:30 AM CST   Lab with  LAB   [facility])    [address] [place] [address] [phone]            Nov 14, 2017  8:45 AM CST   RETURN ARRHYTHMIA with RUBI aCrreon CNP   [facility] ([facility]  [facility])    [address] [place] [address]   [phone]            Nov 20, 2017 10:00 AM CST   Anticoagulation Visit with [facility]X ANTICOAGULATION CLINIC   [facility])    [address] [id number] [phone]            Nov 30, 2017 10:00 AM CST   Lab with UC LAB   [facility] ([facility])    [address] [place] [address]   [phone]            Nov 30, 2017 10:30 AM CST   (Arrive by 10:15 AM)   Implanted Defibulator with Uc Cv Device 1   [facility] ([facility])    [address] [place] [id number] [phone]            Nov 30, 2017 11:00 AM CST   (Arrive by 10:45 AM)   CORE RETURN with Emily Collado NP   [facility] ([facility])    [address] [place] [phone] [phone]            Feb 26, 2018 11:00 AM CST   (Arrive by 10:45 AM)   Implanted Defibulator with Uc Cv Device 1   [facility] ([facility])    [address] [place] [id number] [phone]            Feb 26, 2018 11:30 AM CST   (Arrive by 11:15 AM)   RETURN ARRHYTHMIA with RUBI Carreon [facility])    [address] [place] [phone] [phone]              Who to contact     If you have questions or need follow up information about today's clinic visit or your schedule please contact [facility] directly at [phone].  Normal or non-critical lab and imaging results will be communicated to you by MyChart, letter or phone within 4 business days after the clinic has received the results. If you do not hear from us within 7 days, please contact the clinic through MyChart or  "phone. If you have a critical or abnormal lab result, we will notify you by phone as soon as possible.  Submit refill requests through [employer] or call your pharmacy and they will forward the refill request to us. Please allow 3 business days for your refill to be completed.          Additional Information About Your Visit        [employer] Information     [employer] lets you send messages to your doctor, view your test results, renew your prescriptions, schedule appointments and more. To sign up, go to [email or web address] . Click on \"Log in\" on the left side of the screen, which will take you to the Welcome page. Then click on \"Sign up Now\" on the right side of the page.     You will be asked to enter the access code listed below, as well as some personal information. Please follow the directions to create your username and password.     Your access code is: [email or web address]  Expires: 2017  5:30 AM     Your access code will  in 90 days. If you need help or a new code, please call your [place] clinic or [phone].        Care EveryWhere ID     This is your Care EveryWhere ID. This could be used by other organizations to access your [place] medical records  [id number]        Your Vitals Were     Pulse Height Pulse Oximetry BMI (Body Mass Index)          92 1.575 m (5' 2\") 100% 22.13 kg/m2         Blood Pressure from Last 3 Encounters:   17 104/71   17 130/90   17 100/80    Weight from Last 3 Encounters:   17 54.9 kg (121 lb)   17 55.6 kg (122 lb 8 oz)   17 53.8 kg (118 lb 8 oz)              Today, you had the following     No orders found for display       Primary Care Provider Office Phone # Fax #    Shanna Church [phone]       [address] [place] [address]        Equal Access to Services     CUBA AYALA : Enedelia Justin, mirlande paniagua, karen hernandez'aan ah. So [facility] " [phone].    ATENCIÓN: Si bailey solis, tiene a suarez disposición servicios gratuitos de asistencia lingüística. Jannette bermudez [phone].    We comply with applicable federal civil rights laws and [place] laws. We do not discriminate on the basis of race, color, national origin, age, disability, sex, sexual orientation, or gender identity.            Thank you!     Thank you for choosing [facility]  for your care. Our goal is always to provide you with excellent care. Hearing back from our patients is one way we can continue to improve our services. Please take a few minutes to complete the written survey that you may receive in the mail after your visit with us. Thank you!             Your Updated Medication List - Protect others around you: Learn how to safely use, store and throw away your medicines at www.disposemymeds.org.          This list is accurate as of: 11/14/17  8:14 AM.  Always use your most recent med list.                   Brand Name Dispense Instructions for use Diagnosis    acetaminophen 500 MG tablet    TYLENOL    30 tablet    Take 2 tablets (1,000 mg) by mouth every 6 hours as needed for mild pain    Lower extremity pain, bilateral, Shakiness       acetaminophen-codeine 300-30 MG per tablet    TYLENOL #3    28 tablet    Take 1-2 tablets by mouth every 4 hours as needed for moderate pain    S/P ICD (internal cardiac defibrillator) procedure       alcohol swab prep pads     100 each    Use to swab area of injection/mary alice as directed 3 x per day        ascorbic acid 500 MG Tabs     2 tablet    Take 1 tablet (500 mg) by mouth daily    Coronary artery disease involving native coronary artery of native heart without angina pectoris       ASPIRIN NOT PRESCRIBED    INTENTIONAL    0 each    Please choose reason not prescribed, below    Cardiogenic shock (H), Type 2 diabetes mellitus without complication, with long-term current use of insulin (H)       atorvastatin 40 MG tablet    LIPITOR    60  tablet    1 tablet (40 mg) by Oral or Feeding Tube route daily    Coronary artery disease involving native coronary artery of native heart without angina pectoris, Cardiogenic shock (H)       baclofen 10 MG tablet    LIORESAL    90 tablet    Take 1 tablet (10 mg) by mouth 3 times daily    Lower limb symptom       beta carotene 18121 UNIT capsule     2 capsule    Take 1 capsule (25,000 Units) by mouth daily    Ischemic cardiomyopathy       bumetanide 0.5 MG tablet    BUMEX    120 tablet    Take 1 tablet (0.5 mg) by mouth daily    Ischemic cardiomyopathy       cetirizine 10 MG tablet    zyrTEC    60 tablet    Take 1 tablet (10 mg) by mouth daily    Seasonal allergic rhinitis       clopidogrel 75 MG tablet    PLAVIX    60 tablet    Take 1 tablet (75 mg) by mouth daily    ST elevation myocardial infarction (STEMI), unspecified artery (H)       digoxin 125 MCG tablet    LANOXIN    60 tablet    Take 1 tablet (125 mcg) by mouth daily    Coronary artery disease involving native coronary artery of native heart without angina pectoris, Cardiogenic shock (H)       finasteride 5 MG tablet    PROSCAR    60 tablet    Take 1 tablet (5 mg) by mouth daily    Hypertrophy of prostate with urinary obstruction       insulin glargine 100 UNIT/ML injection    LANTUS    18 mL    Inject 25 Units Subcutaneous every morning (before breakfast)        insulin pen needle 32G X 4 MM    BD KYLEE U/F    100 each    Use 3 daily or as directed.    Diabetes mellitus type 2, insulin dependent (H)       lisinopril 2.5 MG tablet    PRINIVIL/Zestril    30 tablet    Take 2 tablets (5 mg) by mouth daily    Ischemic cardiomyopathy       METOPROLOL SUCCINATE ER PO      Take 25 mg by mouth daily        ONETOUCH ULTRA test strip   Generic drug:  blood glucose monitoring     100 strip    USE TO TEST BLOOD SUGAR 3 TIMES DAILY OR AS DIRECTED.    Diabetes mellitus type 2, insulin dependent (H)       oxyCODONE-acetaminophen 5-325 MG per tablet    PERCOCET    28  tablet    Take 1 tablet by mouth every 4 hours as needed for other (mild or moderate pain)    S/P ICD (internal cardiac defibrillator) procedure       pantoprazole 40 MG EC tablet    PROTONIX    60 tablet    Take 1 tablet (40 mg) by mouth daily    Gastrointestinal hemorrhage associated with other gastritis       potassium chloride SA 20 MEQ CR tablet    K-DUR/KLOR-CON M    120 tablet    Take 1 tablet (20 mEq) by mouth 2 times daily    Ischemic cardiomyopathy       Sharps Container Misc     1 each    1 each every 30 days    Diabetes mellitus type 2, insulin dependent (H)       tamsulosin 0.4 MG capsule    FLOMAX    60 capsule    Take 1 capsule (0.4 mg) by mouth daily    Ischemic cardiomyopathy       * Warfarin Therapy Reminder      1 each continuous prn    Deep vein thrombosis (DVT) of left lower extremity, unspecified chronicity, unspecified vein (H)       * warfarin 3 MG tablet    COUMADIN    100 tablet    Take one tablet daily as directed by the [facility]    Deep vein thrombosis (DVT) of left lower extremity, unspecified chronicity, unspecified vein (H)       zinc sulfate 220 (50 ZN) MG capsule    ZINCATE    2 capsule    Take 1 capsule (220 mg) by mouth daily    Ischemic cardiomyopathy       * Notice:  This list has 2 medication(s) that are the same as other medications prescribed for you. Read the directions carefully, and ask your doctor or other care provider to review them with you.

## 2017-11-15 ENCOUNTER — CARE COORDINATION (OUTPATIENT)
Dept: CARDIOLOGY | Facility: CLINIC | Age: 52
End: 2017-11-15

## 2017-11-15 DIAGNOSIS — I25.5 ISCHEMIC CARDIOMYOPATHY: ICD-10-CM

## 2017-11-15 RX ORDER — LISINOPRIL 2.5 MG/1
2.5 TABLET ORAL DAILY
Qty: 30 TABLET | Refills: 1 | Status: SHIPPED | OUTPATIENT
Start: 2017-11-15 | End: 2018-02-22

## 2017-11-15 NOTE — PROGRESS NOTES
"Wife called back via  stating that patient has been taking lisinopril 2.5mg daily all along. She manages his medications and she was not in the clinic visit on 11/8/17 when the increase in lisinopril was prescribed. She states patient did not tell her that the medication was supposed to be increased. She states the patient would not have taken more medication without her knowing. She states the pill bottle they have shows \"lisinopril 2.5mg daily.\" They could not refill until 11/20, so never got the new prescription.    Per Bertha Rich,NP:  -Stay on lisinopril 2.5mg daily  - f/u with [facility] with labs as scheduled on 11/30.     Encouraged wife to speak directly to  tonight to confirm the dose that he was taking. If there is any discrepancy from what she told us, she needs to call us back.        "

## 2017-11-15 NOTE — PROGRESS NOTES
Date: 11/15/2017    Time of Call: 1:35 PM     Diagnosis:  Elevated creatinine after increasing lisinopril     [ TORB ] Ordering provider: Bertha Rich NP  Order:   Please have patient decrease Lisinopril back to 2.5 mg daily and labs prior to f/u with Emily.      Order received by: Ivana Vitale RN     Follow-up/additional notes:   Via  spoke to wife and gave recommendations. She requested us to call back and leave a message with the recommendations and this will get to the patient.  left detailed message.

## 2017-11-20 ENCOUNTER — ANTICOAGULATION THERAPY VISIT (OUTPATIENT)
Dept: ANTICOAGULATION | Facility: CLINIC | Age: 52
End: 2017-11-20
Payer: COMMERCIAL

## 2017-11-20 DIAGNOSIS — I82.409 DVT (DEEP VENOUS THROMBOSIS) (H): ICD-10-CM

## 2017-11-20 DIAGNOSIS — Z79.01 LONG-TERM (CURRENT) USE OF ANTICOAGULANTS: ICD-10-CM

## 2017-11-20 LAB — INR POINT OF CARE: 2.2 (ref 0.86–1.14)

## 2017-11-20 PROCEDURE — 36416 COLLJ CAPILLARY BLOOD SPEC: CPT

## 2017-11-20 PROCEDURE — 85610 PROTHROMBIN TIME: CPT | Mod: QW

## 2017-11-20 NOTE — MR AVS SNAPSHOT
Javiertrish DEBBI Henao   11/20/2017 9:45 AM   Anticoagulation Therapy Visit    Description:  52 year old male   Provider:  [facility];  ANTICOAGULATION CLINIC   Department:   Anti Coagulation           INR as of 11/20/2017     Today's INR 2.2      Anticoagulation Summary as of 11/20/2017     INR goal 2.0-3.0   Today's INR 2.2   Full instructions 1.5 mg on Mon, Wed, Fri; 3 mg all other days   Next INR check 12/4/2017    Indications   Cerebrovascular accident (CVA) due to thrombosis of cerebral artery (H) (Resolved) [I63.30]  DVT (deep venous thrombosis) (H) [I82.409]  Long-term (current) use of anticoagulants [Z79.01] [Z79.01]         Your next Anticoagulation Clinic appointment(s)     Dec 04, 2017 10:00 AM CST   Anticoagulation Visit with  ANTICOAGULATION CLINIC   [facility] ([facility])    [address] [address] [phone]              Contact Numbers     [facility]  Please call  [phone] to cancel and/or reschedule your appointment   Please call  [phone] with any problems or questions regarding your therapy.        November 2017 Details    Sun Mon Tue Wed Thu Fri Sat        1               2               3               4                 5               6               7               8               9               10               11                 12               13               14               15               16               17               18                 19               20      1.5 mg   See details      21      3 mg         22      1.5 mg         23      3 mg         24      1.5 mg         25      3 mg           26      3 mg         27      1.5 mg         28      3 mg         29      1.5 mg         30      3 mg            Date Details   11/20 This INR check               How to take your warfarin dose     To take:  1.5 mg Take 0.5 of a 3 mg tablet.    To take:  3 mg Take 1 of the 3 mg  tablets.           December 2017 Details    Sun Mon Tue Wed Thu Fri Sat          1      1.5 mg         2      3 mg           3      3 mg         4            5               6               7               8               9                 10               11               12               13               14               15               16                 17               18               19               20               21               22               23                 24               25               26               27               28               29               30                 31                      Date Details   No additional details    Date of next INR:  12/4/2017         How to take your warfarin dose     To take:  1.5 mg Take 0.5 of a 3 mg tablet.    To take:  3 mg Take 1 of the 3 mg tablets.

## 2017-11-20 NOTE — PROGRESS NOTES
ANTICOAGULATION FOLLOW-UP CLINIC VISIT    Patient Name:  Luke Henao  Date:  11/20/2017  Contact Type:  Face to Face    SUBJECTIVE:     Patient Findings     Positives No Problem Findings           OBJECTIVE    INR Protime   Date Value Ref Range Status   11/20/2017 2.2 (A) 0.86 - 1.14 Final       ASSESSMENT / PLAN  INR assessment THER    Recheck INR In: 2 WEEKS    INR Location Clinic      Anticoagulation Summary as of 11/20/2017     INR goal 2.0-3.0   Today's INR 2.2   Maintenance plan 1.5 mg (3 mg x 0.5) on Mon, Wed, Fri; 3 mg (3 mg x 1) all other days   Full instructions 1.5 mg on Mon, Wed, Fri; 3 mg all other days   Weekly total 16.5 mg   Plan last modified Pina Coyle RN (11/13/2017)   Next INR check 12/4/2017   Target end date     Indications   Cerebrovascular accident (CVA) due to thrombosis of cerebral artery (H) (Resolved) [I63.30]  DVT (deep venous thrombosis) (H) [I82.409]  Long-term (current) use of anticoagulants [Z79.01] [Z79.01]         Anticoagulation Episode Summary     INR check location [facility]    Preferred lab     Send INR reminders to  [facility]    Comments             See the Encounter Report to view Anticoagulation Flowsheet and Dosing Calendar (Go to Encounters tab in chart review, and find the Anticoagulation Therapy Visit)        Pina Coyle RN

## 2017-11-21 ENCOUNTER — ALLIED HEALTH/NURSE VISIT (OUTPATIENT)
Dept: CARDIOLOGY | Facility: CLINIC | Age: 52
End: 2017-11-21
Attending: INTERNAL MEDICINE
Payer: COMMERCIAL

## 2017-11-21 DIAGNOSIS — I25.5 ISCHEMIC CARDIOMYOPATHY: Primary | ICD-10-CM

## 2017-11-21 NOTE — MR AVS SNAPSHOT
After Visit Summary   11/21/2017    Luke Henao    MRN: [id number]           Patient Information     Date Of Birth          1965        Visit Information        Provider Department      11/21/2017 10:00 AM 1, Uc Cv Device [facility]        Today's Diagnoses     Ischemic cardiomyopathy    -  1       Follow-ups after your visit        Follow-up notes from your care team     Discussed this visit Return in about 9 days (around 11/30/2017).      Your next 10 appointments already scheduled     Nov 21, 2017 10:00 AM CST   (Arrive by 9:45 AM)   Implanted Defibulator with Uc Cv Device [address] [place] [address] ([facility])    [address] [place] [address]   [phone]            Nov 30, 2017 10:00 AM CST   Lab with UC LAB   [facility])    [address] [place] [address]   [phone]            Nov 30, 2017 10:30 AM CST   (Arrive by 10:15 AM)   Implanted Defibulator with Uc Cv Device [address] [place] [address] ([facility])    [address] [place] [address]   [phone]            Nov 30, 2017 11:00 AM CST   (Arrive by 10:45 AM)   CORE RETURN with Emily Collado NP   [facility] ([facility])    [address] [place] [address]   [phone]            Dec 04, 2017 10:00 AM CST   Anticoagulation Visit with [facility]AGULATION CLINIC   [facility] ([facility])    [address] [address]   [phone]            Feb 26, 2018 11:00 AM CST   (Arrive by 10:45 AM)   Implanted Defibulator with Uc Cv Device [address] [place] [address])    [address] [place] [address]   [phone]            Feb 26, 2018 11:30 AM CST   (Arrive by 11:15 AM)  "  RETURN ARRHYTHMIA with RUBI Carreon CNP   [facility] ([facility])    [address] [facility] [address] [phone]              Who to contact     If you have questions or need follow up information about today's clinic visit or your schedule please contact [facility] directly at [phone].  Normal or non-critical lab and imaging results will be communicated to you by MyChart, letter or phone within 4 business days after the clinic has received the results. If you do not hear from us within 7 days, please contact the clinic through [employer] or phone. If you have a critical or abnormal lab result, we will notify you by phone as soon as possible.  Submit refill requests through 99tests or call your pharmacy and they will forward the refill request to us. Please allow 3 business days for your refill to be completed.          Additional Information About Your Visit        [employer] Information     [email or web address] lets you send messages to your doctor, view your test results, renew your prescriptions, schedule appointments and more. To sign up, go to [email or web address] . Click on \"Log in\" on the left side of the screen, which will take you to the Welcome page. Then click on \"Sign up Now\" on the right side of the page.     You will be asked to enter the access code listed below, as well as some personal information. Please follow the directions to create your username and password.     Your access code is: [email or web address]  Expires: 2017  5:30 AM     Your access code will  in 90 days. If you need help or a new code, please call your [facility] clinic or [phone].        Care EveryWhere ID     This is your Care EveryWhere ID. This could be used by other organizations to access your [facility] medical records  [id number]         Blood Pressure from Last 3 Encounters:   17 104/71   17 130/90   17 100/80    " Weight from Last 3 Encounters:   11/14/17 54.9 kg (121 lb)   11/08/17 55.6 kg (122 lb 8 oz)   11/05/17 53.8 kg (118 lb 8 oz)              Today, you had the following     No orders found for display       Primary Care Provider Office Phone # Fax #    Shanna Church [phone]       [address] [facility] [address]        Equal Access to Services     CUBA AYALA : Hadii aad ku hadasho Soomaali, waaxda luqadaha, qaybta kaalmada adeegyada, waxay idiin hayaan adeeg kharash la'aan . So [facility] [phone].    ATENCIÓN: Si habla español, tiene a suarez disposición servicios gratuitos de asistencia lingüística. [facility] [phone].    We comply with applicable federal civil rights laws and [place] laws. We do not discriminate on the basis of race, color, national origin, age, disability, sex, sexual orientation, or gender identity.            Thank you!     Thank you for choosing [facility]  for your care. Our goal is always to provide you with excellent care. Hearing back from our patients is one way we can continue to improve our services. Please take a few minutes to complete the written survey that you may receive in the mail after your visit with us. Thank you!             Your Updated Medication List - Protect others around you: Learn how to safely use, store and throw away your medicines at www.disposemymeds.org.          This list is accurate as of: 11/21/17  9:44 AM.  Always use your most recent med list.                   Brand Name Dispense Instructions for use Diagnosis    acetaminophen 500 MG tablet    TYLENOL    30 tablet    Take 2 tablets (1,000 mg) by mouth every 6 hours as needed for mild pain    Lower extremity pain, bilateral, Shakiness       acetaminophen-codeine 300-30 MG per tablet    TYLENOL #3    28 tablet    Take 1-2 tablets by mouth every 4 hours as needed for moderate pain    S/P ICD (internal cardiac defibrillator) procedure       alcohol swab prep pads     100  each    Use to swab area of injection/mary alice as directed 3 x per day        ascorbic acid 500 MG Tabs     2 tablet    Take 1 tablet (500 mg) by mouth daily    Coronary artery disease involving native coronary artery of native heart without angina pectoris       ASPIRIN NOT PRESCRIBED    INTENTIONAL    0 each    Please choose reason not prescribed, below    Cardiogenic shock (H), Type 2 diabetes mellitus without complication, with long-term current use of insulin (H)       atorvastatin 40 MG tablet    LIPITOR    60 tablet    1 tablet (40 mg) by Oral or Feeding Tube route daily    Coronary artery disease involving native coronary artery of native heart without angina pectoris, Cardiogenic shock (H)       baclofen 10 MG tablet    LIORESAL    90 tablet    Take 1 tablet (10 mg) by mouth 3 times daily    Lower limb symptom       beta carotene 55472 UNIT capsule     2 capsule    Take 1 capsule (25,000 Units) by mouth daily    Ischemic cardiomyopathy       bumetanide 0.5 MG tablet    BUMEX    120 tablet    Take 1 tablet (0.5 mg) by mouth daily    Ischemic cardiomyopathy       cetirizine 10 MG tablet    zyrTEC    60 tablet    Take 1 tablet (10 mg) by mouth daily    Seasonal allergic rhinitis       clopidogrel 75 MG tablet    PLAVIX    60 tablet    Take 1 tablet (75 mg) by mouth daily    ST elevation myocardial infarction (STEMI), unspecified artery (H)       digoxin 125 MCG tablet    LANOXIN    60 tablet    Take 1 tablet (125 mcg) by mouth daily    Coronary artery disease involving native coronary artery of native heart without angina pectoris, Cardiogenic shock (H)       finasteride 5 MG tablet    PROSCAR    60 tablet    Take 1 tablet (5 mg) by mouth daily    Hypertrophy of prostate with urinary obstruction       insulin glargine 100 UNIT/ML injection    LANTUS    18 mL    Inject 25 Units Subcutaneous every morning (before breakfast)        insulin pen needle 32G X 4 MM    BD KYLEE U/F    100 each    Use 3 daily or as  directed.    Diabetes mellitus type 2, insulin dependent (H)       lisinopril 2.5 MG tablet    PRINIVIL/Zestril    30 tablet    Take 1 tablet (2.5 mg) by mouth daily    Ischemic cardiomyopathy       METOPROLOL SUCCINATE ER PO      Take 25 mg by mouth daily        ONETOUCH ULTRA test strip   Generic drug:  blood glucose monitoring     100 strip    USE TO TEST BLOOD SUGAR 3 TIMES DAILY OR AS DIRECTED.    Diabetes mellitus type 2, insulin dependent (H)       oxyCODONE-acetaminophen 5-325 MG per tablet    PERCOCET    28 tablet    Take 1 tablet by mouth every 4 hours as needed for other (mild or moderate pain)    S/P ICD (internal cardiac defibrillator) procedure       pantoprazole 40 MG EC tablet    PROTONIX    60 tablet    Take 1 tablet (40 mg) by mouth daily    Gastrointestinal hemorrhage associated with other gastritis       potassium chloride SA 20 MEQ CR tablet    K-DUR/KLOR-CON M    120 tablet    Take 1 tablet (20 mEq) by mouth 2 times daily    Ischemic cardiomyopathy       Sharps Container Misc     1 each    1 each every 30 days    Diabetes mellitus type 2, insulin dependent (H)       tamsulosin 0.4 MG capsule    FLOMAX    60 capsule    Take 1 capsule (0.4 mg) by mouth daily    Ischemic cardiomyopathy       * Warfarin Therapy Reminder      1 each continuous prn    Deep vein thrombosis (DVT) of left lower extremity, unspecified chronicity, unspecified vein (H)       * warfarin 3 MG tablet    COUMADIN    100 tablet    Take one tablet daily as directed by the ACC    Deep vein thrombosis (DVT) of left lower extremity, unspecified chronicity, unspecified vein (H)       zinc sulfate 220 (50 ZN) MG capsule    ZINCATE    2 capsule    Take 1 capsule (220 mg) by mouth daily    Ischemic cardiomyopathy       * Notice:  This list has 2 medication(s) that are the same as other medications prescribed for you. Read the directions carefully, and ask your doctor or other care provider to review them with you.

## 2017-11-24 ENCOUNTER — PRE VISIT (OUTPATIENT)
Dept: CARDIOLOGY | Facility: CLINIC | Age: 52
End: 2017-11-24

## 2017-11-24 DIAGNOSIS — I50.22 CHRONIC SYSTOLIC HEART FAILURE (H): Primary | ICD-10-CM

## 2017-11-30 ENCOUNTER — ALLIED HEALTH/NURSE VISIT (OUTPATIENT)
Dept: CARDIOLOGY | Facility: CLINIC | Age: 52
End: 2017-11-30
Attending: INTERNAL MEDICINE
Payer: COMMERCIAL

## 2017-11-30 ENCOUNTER — OFFICE VISIT (OUTPATIENT)
Dept: CARDIOLOGY | Facility: CLINIC | Age: 52
End: 2017-11-30
Attending: NURSE PRACTITIONER
Payer: COMMERCIAL

## 2017-11-30 VITALS
WEIGHT: 123.9 LBS | HEART RATE: 96 BPM | SYSTOLIC BLOOD PRESSURE: 100 MMHG | HEIGHT: 62 IN | OXYGEN SATURATION: 99 % | DIASTOLIC BLOOD PRESSURE: 74 MMHG | BODY MASS INDEX: 22.8 KG/M2

## 2017-11-30 DIAGNOSIS — I50.22 CHRONIC SYSTOLIC HEART FAILURE (H): ICD-10-CM

## 2017-11-30 DIAGNOSIS — I25.5 ISCHEMIC CARDIOMYOPATHY: ICD-10-CM

## 2017-11-30 DIAGNOSIS — I25.5 ISCHEMIC CARDIOMYOPATHY: Primary | ICD-10-CM

## 2017-11-30 LAB
ANION GAP SERPL CALCULATED.3IONS-SCNC: 7 MMOL/L (ref 3–14)
BUN SERPL-MCNC: 20 MG/DL (ref 7–30)
CALCIUM SERPL-MCNC: 9.5 MG/DL (ref 8.5–10.1)
CHLORIDE SERPL-SCNC: 104 MMOL/L (ref 94–109)
CO2 SERPL-SCNC: 29 MMOL/L (ref 20–32)
CREAT SERPL-MCNC: 1.3 MG/DL (ref 0.66–1.25)
GFR SERPL CREATININE-BSD FRML MDRD: 58 ML/MIN/1.7M2
GLUCOSE SERPL-MCNC: 118 MG/DL (ref 70–99)
POTASSIUM SERPL-SCNC: 4 MMOL/L (ref 3.4–5.3)
SODIUM SERPL-SCNC: 139 MMOL/L (ref 133–144)

## 2017-11-30 PROCEDURE — 99212 OFFICE O/P EST SF 10 MIN: CPT | Mod: 25,ZF

## 2017-11-30 PROCEDURE — T1013 SIGN LANG/ORAL INTERPRETER: HCPCS | Mod: U3

## 2017-11-30 PROCEDURE — T1013 SIGN LANG/ORAL INTERPRETER: HCPCS | Mod: U3,ZF

## 2017-11-30 PROCEDURE — 80048 BASIC METABOLIC PNL TOTAL CA: CPT | Performed by: NURSE PRACTITIONER

## 2017-11-30 PROCEDURE — 99213 OFFICE O/P EST LOW 20 MIN: CPT | Mod: 25 | Performed by: NURSE PRACTITIONER

## 2017-11-30 PROCEDURE — 36415 COLL VENOUS BLD VENIPUNCTURE: CPT | Performed by: NURSE PRACTITIONER

## 2017-11-30 PROCEDURE — 93282 PRGRMG EVAL IMPLANTABLE DFB: CPT | Mod: 26 | Performed by: INTERNAL MEDICINE

## 2017-11-30 PROCEDURE — 93282 PRGRMG EVAL IMPLANTABLE DFB: CPT | Mod: ZF

## 2017-11-30 RX ORDER — POTASSIUM CHLORIDE 1500 MG/1
20 TABLET, EXTENDED RELEASE ORAL DAILY
Qty: 90 TABLET | Refills: 3 | Status: SHIPPED | OUTPATIENT
Start: 2017-11-30 | End: 2018-03-12

## 2017-11-30 RX ORDER — BUMETANIDE 0.5 MG/1
0.5 TABLET ORAL EVERY OTHER DAY
Qty: 45 TABLET | Refills: 3 | Status: SHIPPED | OUTPATIENT
Start: 2017-11-30 | End: 2018-09-11

## 2017-11-30 ASSESSMENT — PAIN SCALES - GENERAL: PAINLEVEL: NO PAIN (0)

## 2017-11-30 NOTE — PROGRESS NOTES
Pt seen in the [facility] for evaluation and iterative programming of a Medtronic, single lead ICD, per MD orders. He also has an appointment with Emily Collado NP. Today his intrinsic rhythm is SR 90 bpm. Normal ICD function. No arrhythmias recorded. OptiVol thoracic impedance is establishing his baseline.   <0.1%. No short v-v intervals recorded. Lead trends appear stable. Pt reports that he is feeling well. Battery estimates 11.4 years to MOMO. Plan for pt to RTC in 3 months.  Single lead ICD

## 2017-11-30 NOTE — PATIENT INSTRUCTIONS
It was a pleasure to see you in clinic today. Please do not hesitate to call with any questions or concerns. We look forward to seeing you in clinic at your next device check in 3 months.    Mony Reed RN  Electrophysiology Nurse Clinician  [facility]  During business hours call:  [phone]  After business hours please call: [phone]- select option #4 and ask for job code [phone].

## 2017-11-30 NOTE — NURSING NOTE
Chief Complaint   Patient presents with     Follow Up For     Return CORE; 52yr old male with a h/o chronic systolic HF secondary to ICM with reduced EF 32%. S/P mitralclip presenting for follow-up with labs prior     Vitals were taken and medications were reconciled.     KATYA Hernandes  10:18 AM

## 2017-11-30 NOTE — LETTER
11/30/2017      RE: Luke Henao  [address]  [facility] [address]       Dear Colleague,    Thank you for the opportunity to participate in the care of your patient, Luke Henao, at the [facility]. Please see a copy of my visit note below.    HPI  Mr. Henao is a 52 year old man with a history of ischemic cardiomyopathy s/p complex percutaneous revascularization (FEMI x7 to RCA, Lcx, and LAD in 3/2017) with refractory cardiogenic shock requiring IABP support and prolonged hospitalization, s/p MitraClip (5/1/17), renal insufficiency with several episodes of PEDRO in the setting of hypotension referred to CORE clinic by Dr. Cortez for uptitration of heart failure regimen. Since his last CORE visit, he's undergone ICD implant and lisinopril was introduced. He did develop a ICD pocket hematoma in the setting of supra-therapeutic INR. With a bump in creatinine, his lisinopril was reduced 2 weeks ago. He presents to clinic today for follow up.    Mr. Henao reports feeling well. He denies any shortness of breath. He notes no limitations. He denies orthopnea, PND, chest pain, rare palpitations. No lightheadedness, falls, syncope, or edema. Appetite is good without nausea or early satiety.    ICD implant site has improved with less swelling. No redness, discharge, or fevers.     PMH  Past Medical History:   Diagnosis Date     CAD (coronary artery disease) 2017    RCA STEMI s/p balloon angioplasty and multiple Sofie to RCA, LCx, and LAD     DVT (deep venous thrombosis) (H) 2017    left internal jugular (line-associated); left common femoral; on coumadin     Ischemic cardiomyopathy 2017    EF 30-35%     Ischemic stroke (H) 2017    no residual deficits     Lower GI bleed 2017    due to rectal ulcer     Mitral valve insufficiency, ischemic 2017    s/p Mitral Clip placement      Type 2 diabetes mellitus (H)      Social History     Social History     Marital status:       Spouse name: N/A     Number of children: N/A     Years of education: N/A     Occupational History     [employer]      Social History Main Topics     Smoking status: Former Smoker     Packs/day: 0.50     Years: 30.00     Types: Cigarettes     Quit date: 1/1/2017     Smokeless tobacco: Never Used     Alcohol use No     Drug use: No     Sexual activity: Not Currently     Other Topics Concern     Not on file     Social History Narrative    . Remarried.    4 children with first marriage.    2 grand children.    Walks daily, but no formal exercise.          Family History   Problem Relation Age of Onset     Hypertension Mother      Type 2 Diabetes Father      Hypertension Father      Myocardial Infarction No family hx of      CEREBROVASCULAR DISEASE No family hx of      Coronary Artery Disease Early Onset No family hx of      Colon Cancer No family hx of      Prostate Cancer No family hx of        MEDS   Current Outpatient Prescriptions on File Prior to Visit:  lisinopril (PRINIVIL/ZESTRIL) 2.5 MG tablet Take 1 tablet (2.5 mg) by mouth daily   ONETOUCH ULTRA test strip USE TO TEST BLOOD SUGAR 3 TIMES DAILY OR AS DIRECTED.   acetaminophen-codeine (TYLENOL #3) 300-30 MG per tablet Take 1-2 tablets by mouth every 4 hours as needed for moderate pain   METOPROLOL SUCCINATE ER PO Take 25 mg by mouth daily   oxyCODONE-acetaminophen (PERCOCET) 5-325 MG per tablet Take 1 tablet by mouth every 4 hours as needed for other (mild or moderate pain)   warfarin (COUMADIN) 3 MG tablet Take one tablet daily as directed by the ACC   digoxin (LANOXIN) 125 MCG tablet Take 1 tablet (125 mcg) by mouth daily   insulin pen needle (BD KYLEE U/F) 32G X 4 MM Use 3 daily or as directed.   bumetanide (BUMEX) 0.5 MG tablet Take 1 tablet (0.5 mg) by mouth daily   alcohol swab prep pads Use to swab area of injection/mary alice as directed 3 x per day   insulin glargine (LANTUS) 100 UNIT/ML injection Inject 25 Units Subcutaneous every  "morning (before breakfast)   atorvastatin (LIPITOR) 40 MG tablet 1 tablet (40 mg) by Oral or Feeding Tube route daily   cetirizine (ZYRTEC) 10 MG tablet Take 1 tablet (10 mg) by mouth daily   clopidogrel (PLAVIX) 75 MG tablet Take 1 tablet (75 mg) by mouth daily   finasteride (PROSCAR) 5 MG tablet Take 1 tablet (5 mg) by mouth daily   pantoprazole (PROTONIX) 40 MG EC tablet Take 1 tablet (40 mg) by mouth daily   potassium chloride SA (K-DUR/KLOR-CON M) 20 MEQ CR tablet Take 1 tablet (20 mEq) by mouth 2 times daily   acetaminophen (TYLENOL) 500 MG tablet Take 2 tablets (1,000 mg) by mouth every 6 hours as needed for mild pain   ascorbic acid 500 MG TABS Take 1 tablet (500 mg) by mouth daily   beta carotene 88712 UNIT capsule Take 1 capsule (25,000 Units) by mouth daily   tamsulosin (FLOMAX) 0.4 MG capsule Take 1 capsule (0.4 mg) by mouth daily   zinc sulfate (ZINCATE) 220 (50 ZN) MG capsule Take 1 capsule (220 mg) by mouth daily   Sharps Container MISC 1 each every 30 days   Warfarin Therapy Reminder 1 each continuous prn   baclofen (LIORESAL) 10 MG tablet Take 1 tablet (10 mg) by mouth 3 times daily (Patient not taking: Reported on 11/30/2017)   ASPIRIN NOT PRESCRIBED (INTENTIONAL) Please choose reason not prescribed, below (Patient not taking: Reported on 11/30/2017)     No current facility-administered medications on file prior to visit.     Physical examination:  /74 (BP Location: Left arm, Patient Position: Chair, Cuff Size: Adult Regular)  Pulse 96  Ht 1.575 m (5' 2\")  Wt 56.2 kg (123 lb 14.4 oz)  SpO2 99%  BMI 22.66 kg/m2  GENERAL APPEARANCE: healthy, alert and no distress  HEENT: no icterus, no xanthelasmas, normal pupil size and reaction, normal palate, mucosa moist, no cyanosis.  NECK: no adenopathy, no asymmetry, masses, or scars  CHEST: lungs clear to auscultation - no rales, rhonchi or wheezes, no use of accessory muscles, no retractions, respirations are unlabored, normal respiratory rate, no " kyphosis, no scoliosis.   CARDIOVASCULAR: regular rhythm, normal S1 with physiologic split S2, no S3 or S4 and no murmur, click or rub, precordium quiet with normal PMI. JVP is flat  ABDOMEN: soft, non tender, without hepatomegaly, no masses palpable, bowel sounds normal  EXTREMITIES: no clubbing, cyanosis or edema  NEURO: alert and oriented to person/place/time, normal speech, gait and affect  SKIN: Left subclavian ICD site with well healed with approximated margins, no swelling or ecchymoses    LABS  Last Basic Metabolic Panel:  Lab Results   Component Value Date     2017      Lab Results   Component Value Date    POTASSIUM 4.0 2017     Lab Results   Component Value Date    CHLORIDE 104 2017     Lab Results   Component Value Date    ROB 9.5 2017     Lab Results   Component Value Date    CO2 29 2017     Lab Results   Component Value Date    BUN 20 2017     Lab Results   Component Value Date    CR 1.30 2017     Lab Results   Component Value Date     2017             ECHO  Recent Results (from the past 4320 hour(s))   ECHO COMPLETE WITH OPTISON    Narrative    [id number]  [facility]  [id number]  [id number]^MEGHAN^IVA^AMBER           [facility]  [address] [place] [address]     Name: SYDNIE SIERRA  MRN: [id number]  : 1965  Study Date: 08/10/2017 09:02 AM  Age: 52 yrs  Gender: Male  Patient Location: [facility]  Reason For Study: Nonrheumatic mitral (valve) insufficiency  Ordering Physician: IVA CHRISTIANSON  Referring Physician: IAV CHRISTIANSON  Performed By: True Newman RDCS     BSA: 1.5 m2  Height: 62 in  Weight: 118 lb  _____________________________________________________________________________  __        Procedure  Echocardiogram with two-dimensional, color and spectral Doppler  performed.  _____________________________________________________________________________  __        Interpretation Summary  Moderately (EF 30-35%) reduced left ventricular function is present (bi-plane  32%). Basal lateral, inferior, and inferoseptal hypokinesis.  Global RV function mildly reduced.  S/p trans-catheter MVR 6/2017. Mild mitral insufficiency is present. Mean  valve gradient 5 mmHg at a heart rate of 104 bpm.  Right ventricular systolic pressure is 40mmHg above the right atrial pressure.  The inferior vena cava is normal in size with preserved respiratory  variability.  No pericardial effusion is present.  _____________________________________________________________________________  __        Left Ventricle  Left ventricular wall thickness is normal. Left ventricular size is normal.  Diastolic function cannot be assessedBiplane traced at 32%. Moderately (EF 35-  40%) reduced left ventricular function is present. Basal lateral and inferior,  basal and mid inferoseptal, basal inferior hypokinesis.     Right Ventricle  The right ventricle is normal size. Global right ventricular function is  mildly reduced.     Atria  The right atria appears normal. Mild left atrial enlargement is present. The  atrial septum is intact as assessed by color Doppler .        Mitral Valve  S/p mitral valve repair. Mean gradient across the valve is 5 mmHg. Mild mitral  annular calcification is present. Mild mitral insufficiency is present. The  mean mitral valve gradient is 4.6 mmHg.     Aortic Valve  The aortic valve is tricuspid. Trace aortic insufficiency is present.     Tricuspid Valve  Mild tricuspid insufficiency is present. Right ventricular systolic pressure  is 40mmHg above the right atrial pressure.     Pulmonic Valve  The pulmonic valve is normal. Trace pulmonic insufficiency is present.     Vessels  The aorta root is normal. The inferior vena cava was normal in size with  preserved respiratory variability.  Estimated mean right atrial pressure is 3  mmHg.     Pericardium  No pericardial effusion is present.        Compared to Previous Study  This study was compared with the study from 2017 . The RVSP is marginally  higher.     Attestation  I have personally viewed the imaging and agree with the interpretation and  report as documented by the fellow, Dmitriy Merino, and/or edited by me.  _____________________________________________________________________________  __     MMode/2D Measurements & Calculations  IVSd: 0.73 cm  LVIDd: 5.3 cm  LVIDs: 4.5 cm  LVPWd: 0.46 cm  FS: 14.0 %  EDV(Teich): 134.6 ml  ESV(Teich): 94.8 ml  LV mass(C)d: 103.8 grams  LV mass(C)dI: 67.9 grams/m2  Ao root diam: 2.6 cm  asc Aorta Diam: 3.1 cm  LVOT diam: 1.8 cm  LVOT area: 2.6 cm2        Doppler Measurements & Calculations  MV max P.3 mmHg  MV mean P.6 mmHg  MV V2 VTI: 23.7 cm  TR max isabel: 317.6 cm/sec  TR max P.4 mmHg        _____________________________________________________________________________  __        Report approved by: Isabel Daniel 08/10/2017 03:02 PM          Assessment and Plan   Mr. Henao is a 52 year old man with a history of ischemic cardiomyopathy s/p Mitraclip and complicated revascularization, chronic renal insufficiency, who appears well. Creatinine improved from 2 weeks ago, though still above baseline (1.1-1.2). BUN is normal. We'll reduce bumex to 0.5 mg every other day and the potassium to 20 mEq once daily. Repeat labs in 2 weeks includuding a digoxin level since it's been 6 months since his digoxin level was tested (17). We'll repeat it when he returns to clinic in 1 month and prn.           20 minutes in direct care, >50% in counseling          Please do not hesitate to contact me if you have any questions/concerns.     Sincerely,     Emily Collado, [place]

## 2017-11-30 NOTE — PROGRESS NOTES
HPI  Mr. Henao is a 52 year old man with a history of ischemic cardiomyopathy s/p complex percutaneous revascularization (FEMI x7 to RCA, Lcx, and LAD in 3/2017) with refractory cardiogenic shock requiring IABP support and prolonged hospitalization, s/p MitraClip (5/1/17), renal insufficiency with several episodes of PEDRO in the setting of hypotension referred to CORE clinic by Dr. Cortez for uptitration of heart failure regimen. Since his last CORE visit, he's undergone ICD implant and lisinopril was introduced. He did develop a ICD pocket hematoma in the setting of supra-therapeutic INR. With a bump in creatinine, his lisinopril was reduced 2 weeks ago. He presents to clinic today for follow up.    Mr. Henao reports feeling well. He denies any shortness of breath. He notes no limitations. He denies orthopnea, PND, chest pain, rare palpitations. No lightheadedness, falls, syncope, or edema. Appetite is good without nausea or early satiety.    ICD implant site has improved with less swelling. No redness, discharge, or fevers.     PMH  Past Medical History:   Diagnosis Date     CAD (coronary artery disease) 2017    RCA STEMI s/p balloon angioplasty and multiple Sofie to RCA, LCx, and LAD     DVT (deep venous thrombosis) (H) 2017    left internal jugular (line-associated); left common femoral; on coumadin     Ischemic cardiomyopathy 2017    EF 30-35%     Ischemic stroke (H) 2017    no residual deficits     Lower GI bleed 2017    due to rectal ulcer     Mitral valve insufficiency, ischemic 2017    s/p Mitral Clip placement      Type 2 diabetes mellitus (H)      Social History     Social History     Marital status:      Spouse name: N/A     Number of children: N/A     Years of education: N/A     Occupational History     [employer]      Social History Main Topics     Smoking status: Former Smoker     Packs/day: 0.50     Years: 30.00     Types: Cigarettes     Quit date: 1/1/2017     Smokeless tobacco: Never Used      Alcohol use No     Drug use: No     Sexual activity: Not Currently     Other Topics Concern     Not on file     Social History Narrative    . Remarried.    4 children with first marriage.    2 grand children.    Walks daily, but no formal exercise.          Family History   Problem Relation Age of Onset     Hypertension Mother      Type 2 Diabetes Father      Hypertension Father      Myocardial Infarction No family hx of      CEREBROVASCULAR DISEASE No family hx of      Coronary Artery Disease Early Onset No family hx of      Colon Cancer No family hx of      Prostate Cancer No family hx of        MEDS   Current Outpatient Prescriptions on File Prior to Visit:  lisinopril (PRINIVIL/ZESTRIL) 2.5 MG tablet Take 1 tablet (2.5 mg) by mouth daily   ONETOUCH ULTRA test strip USE TO TEST BLOOD SUGAR 3 TIMES DAILY OR AS DIRECTED.   acetaminophen-codeine (TYLENOL #3) 300-30 MG per tablet Take 1-2 tablets by mouth every 4 hours as needed for moderate pain   METOPROLOL SUCCINATE ER PO Take 25 mg by mouth daily   oxyCODONE-acetaminophen (PERCOCET) 5-325 MG per tablet Take 1 tablet by mouth every 4 hours as needed for other (mild or moderate pain)   warfarin (COUMADIN) 3 MG tablet Take one tablet daily as directed by the [facility]   digoxin (LANOXIN) 125 MCG tablet Take 1 tablet (125 mcg) by mouth daily   insulin pen needle (BD KYLEE U/F) 32G X 4 MM Use 3 daily or as directed.   bumetanide (BUMEX) 0.5 MG tablet Take 1 tablet (0.5 mg) by mouth daily   alcohol swab prep pads Use to swab area of injection/mary alice as directed 3 x per day   insulin glargine (LANTUS) 100 UNIT/ML injection Inject 25 Units Subcutaneous every morning (before breakfast)   atorvastatin (LIPITOR) 40 MG tablet 1 tablet (40 mg) by Oral or Feeding Tube route daily   cetirizine (ZYRTEC) 10 MG tablet Take 1 tablet (10 mg) by mouth daily   clopidogrel (PLAVIX) 75 MG tablet Take 1 tablet (75 mg) by mouth daily   finasteride (PROSCAR) 5 MG tablet Take 1 tablet  "(5 mg) by mouth daily   pantoprazole (PROTONIX) 40 MG EC tablet Take 1 tablet (40 mg) by mouth daily   potassium chloride SA (K-DUR/KLOR-CON M) 20 MEQ CR tablet Take 1 tablet (20 mEq) by mouth 2 times daily   acetaminophen (TYLENOL) 500 MG tablet Take 2 tablets (1,000 mg) by mouth every 6 hours as needed for mild pain   ascorbic acid 500 MG TABS Take 1 tablet (500 mg) by mouth daily   beta carotene 98102 UNIT capsule Take 1 capsule (25,000 Units) by mouth daily   tamsulosin (FLOMAX) 0.4 MG capsule Take 1 capsule (0.4 mg) by mouth daily   zinc sulfate (ZINCATE) 220 (50 ZN) MG capsule Take 1 capsule (220 mg) by mouth daily   Sharps Container MISC 1 each every 30 days   Warfarin Therapy Reminder 1 each continuous prn   baclofen (LIORESAL) 10 MG tablet Take 1 tablet (10 mg) by mouth 3 times daily (Patient not taking: Reported on 11/30/2017)   ASPIRIN NOT PRESCRIBED (INTENTIONAL) Please choose reason not prescribed, below (Patient not taking: Reported on 11/30/2017)     No current facility-administered medications on file prior to visit.     Physical examination:  /74 (BP Location: Left arm, Patient Position: Chair, Cuff Size: Adult Regular)  Pulse 96  Ht 1.575 m (5' 2\")  Wt 56.2 kg (123 lb 14.4 oz)  SpO2 99%  BMI 22.66 kg/m2  GENERAL APPEARANCE: healthy, alert and no distress  HEENT: no icterus, no xanthelasmas, normal pupil size and reaction, normal palate, mucosa moist, no cyanosis.  NECK: no adenopathy, no asymmetry, masses, or scars  CHEST: lungs clear to auscultation - no rales, rhonchi or wheezes, no use of accessory muscles, no retractions, respirations are unlabored, normal respiratory rate, no kyphosis, no scoliosis.   CARDIOVASCULAR: regular rhythm, normal S1 with physiologic split S2, no S3 or S4 and no murmur, click or rub, precordium quiet with normal PMI. JVP is flat  ABDOMEN: soft, non tender, without hepatomegaly, no masses palpable, bowel sounds normal  EXTREMITIES: no clubbing, cyanosis or " edema  NEURO: alert and oriented to person/place/time, normal speech, gait and affect  SKIN: Left subclavian ICD site with well healed with approximated margins, no swelling or ecchymoses    LABS  Last Basic Metabolic Panel:  Lab Results   Component Value Date     2017      Lab Results   Component Value Date    POTASSIUM 4.0 2017     Lab Results   Component Value Date    CHLORIDE 104 2017     Lab Results   Component Value Date    ROB 9.5 2017     Lab Results   Component Value Date    CO2 29 2017     Lab Results   Component Value Date    BUN 20 2017     Lab Results   Component Value Date    CR 1.30 2017     Lab Results   Component Value Date     2017             ECHO  Recent Results (from the past 4320 hour(s))   ECHO COMPLETE WITH OPTISON    Narrative    [id number]  [facility]  [id number]  [id number]^MEGHAN^IVA^AMBER           [facility]  Diagnostic and Treamtent-3rd Floor  [address] [place] [id number]     Name: SYDNIE SIERRA  MRN: [id number]  : 1965  Study Date: 08/10/2017 09:02 AM  Age: 52 yrs  Gender: Male  [place] [facility] [place]  Reason For Study: Nonrheumatic mitral (valve) insufficiency  Ordering Physician: IVA CHRISTIANSON  Referring Physician: IVA CHRISTIANSON  Performed By: True Newman RDCS     BSA: 1.5 m2  Height: 62 in  Weight: 118 lb  _____________________________________________________________________________  __        Procedure  Echocardiogram with two-dimensional, color and spectral Doppler performed.  _____________________________________________________________________________  __        Interpretation Summary  Moderately (EF 30-35%) reduced left ventricular function is present (bi-plane  32%). Basal lateral, inferior, and inferoseptal hypokinesis.  Global RV function mildly reduced.  S/p trans-catheter MVR 2017. Mild mitral insufficiency is present. Mean  valve gradient 5  mmHg at a heart rate of 104 bpm.  Right ventricular systolic pressure is 40mmHg above the right atrial pressure.  The inferior vena cava is normal in size with preserved respiratory  variability.  No pericardial effusion is present.  _____________________________________________________________________________  __        Left Ventricle  Left ventricular wall thickness is normal. Left ventricular size is normal.  Diastolic function cannot be assessedBiplane traced at 32%. Moderately (EF 35-  40%) reduced left ventricular function is present. Basal lateral and inferior,  basal and mid inferoseptal, basal inferior hypokinesis.     Right Ventricle  The right ventricle is normal size. Global right ventricular function is  mildly reduced.     Atria  The right atria appears normal. Mild left atrial enlargement is present. The  atrial septum is intact as assessed by color Doppler .        Mitral Valve  S/p mitral valve repair. Mean gradient across the valve is 5 mmHg. Mild mitral  annular calcification is present. Mild mitral insufficiency is present. The  mean mitral valve gradient is 4.6 mmHg.     Aortic Valve  The aortic valve is tricuspid. Trace aortic insufficiency is present.     Tricuspid Valve  Mild tricuspid insufficiency is present. Right ventricular systolic pressure  is 40mmHg above the right atrial pressure.     Pulmonic Valve  The pulmonic valve is normal. Trace pulmonic insufficiency is present.     Vessels  The aorta root is normal. The inferior vena cava was normal in size with  preserved respiratory variability. Estimated mean right atrial pressure is 3  mmHg.     Pericardium  No pericardial effusion is present.        Compared to Previous Study  This study was compared with the study from 6/8/2017 . The RVSP is marginally  higher.     Attestation  I have personally viewed the imaging and agree with the interpretation and  report as documented by the fellow, Dmitriy Merino, and/or edited by  me.  _____________________________________________________________________________  __     MMode/2D Measurements & Calculations  IVSd: 0.73 cm  LVIDd: 5.3 cm  LVIDs: 4.5 cm  LVPWd: 0.46 cm  FS: 14.0 %  EDV(Teich): 134.6 ml  ESV(Teich): 94.8 ml  LV mass(C)d: 103.8 grams  LV mass(C)dI: 67.9 grams/m2  Ao root diam: 2.6 cm  asc Aorta Diam: 3.1 cm  LVOT diam: 1.8 cm  LVOT area: 2.6 cm2        Doppler Measurements & Calculations  MV max P.3 mmHg  MV mean P.6 mmHg  MV V2 VTI: 23.7 cm  TR max isabel: 317.6 cm/sec  TR max P.4 mmHg        _____________________________________________________________________________  __        Report approved by: Isabel Daniel 08/10/2017 03:02 PM          Assessment and Plan   Mr. Henao is a 52 year old man with a history of ischemic cardiomyopathy s/p Mitraclip and complicated revascularization, chronic renal insufficiency, who appears well. Creatinine improved from 2 weeks ago, though still above baseline (1.1-1.2). BUN is normal. We'll reduce bumex to 0.5 mg every other day and the potassium to 20 mEq once daily. Repeat labs in 2 weeks includuding a digoxin level since it's been 6 months since his digoxin level was tested (17). We'll repeat it when he returns to clinic in 1 month and prn.           20 minutes in direct care, >50% in counseling

## 2017-11-30 NOTE — PATIENT INSTRUCTIONS
"You were seen today in the Cardiovascular Clinic at the [facility].     Cardiology Providers you saw during your visit: Emily Collado      1. Please reduce Bumetanide to 0.5 mg EVERY OTHER DAY    2. Please reduce potassium to 20 mEq ONCE daily    3. Please return for blood work in 2 weeks    4. Please return to CORE clinic in 1 month or as needed.       Please limit your fluid intake to 2 L (64 ounces) daily.  2 Liters a day = 8.5 cups, or 72 ounces.  Please limit your salt intake to 2 grams a day or less.    If you gain 2# in 24 hours or 5# in one week call Herminia Jiménez RN so we can adjust your medications as needed over the phone.    Please feel free to call me with any questions or concerns.      Herminia Jiménez RN BSN CHFN  [facility]  Cardiology Care Coordinator-Heart Failure Clinic    Questions and [phone].   First press #1 for the [facility] and then press #3 for \"Medical Questions\" to reach us Cardiology Nurses.     On Call Cardiologist for after hours or on weekends: [phone]   option #4 and ask to speak to the on-call Cardiologist. Inform them you are a CORE/heart failure patient at the [facility].        If you need a medication refill please contact your pharmacy.  Please allow 3 business days for your refill to be completed.  _______________________________________________________  [facility]IN[facility] Cardiomyopathy, Optimization, Rehabilitation, Education   The [facility].R.E[facility] CLINIC is a heart failure specialty clinic within the [facility] where you will work with specialized nurse practitioners dedicated to helping patients with heart failure carefully adjust medications, receive education, and learn who and when to call if symptoms develop. They specialize in helping you better understand your condition, slow the progression of your disease, improve the length and quality of your life, help you detect future " heart problems before they become life threatening, and avoid hospitalizations.  As always, thank you for trusting us with your health care needs!

## 2017-11-30 NOTE — MR AVS SNAPSHOT
"              After Visit Summary   2017    Luke Henao    MRN: [id number]           Patient Information     Date Of Birth          1965        Visit Information        Provider Department      2017 10:45 AM Cristobal, Son; Emily Collado NP [facility]        Today's Diagnoses     Ischemic cardiomyopathy          Care Instructions    You were seen today in the Cardiovascular Clinic at the [facility].     Cardiology Providers you saw during your visit: Emily Collado      1. Please reduce Bumetanide to 0.5 mg EVERY OTHER DAY    2. Please reduce potassium to 20 mEq ONCE daily    3. Please return for blood work in 2 weeks    4. Please return to CORE clinic in 1 month or as needed.       Please limit your fluid intake to 2 L (64 ounces) daily.  2 Liters a day = 8.5 cups, or 72 ounces.  Please limit your salt intake to 2 grams a day or less.    If you gain 2# in 24 hours or 5# in one week call Herminia Jiménez RN so we can adjust your medications as needed over the phone.    Please feel free to call me with any questions or concerns.      Herminia Jiménez RN BSN CHFN  [facility]  Cardiology Care Coordinator-Heart Failure Clinic    Questions and [phone].   First press #1 for the University and then press #3 for \"Medical Questions\" to reach us Cardiology Nurses.     On Call Cardiologist for after hours or on weekends: [phone]   option #4 and ask to speak to the on-call Cardiologist. Inform them you are a CORE/heart failure patient at the [place].        If you need a medication refill please contact your pharmacy.  Please allow 3 business days for your refill to be completed.  _______________________________________________________  C.O.R.E. CLINIC Cardiomyopathy, Optimization, Rehabilitation, Education   The C.O.R.E. CLINIC is a heart failure specialty clinic within the [facility] where you will work " with specialized nurse practitioners dedicated to helping patients with heart failure carefully adjust medications, receive education, and learn who and when to call if symptoms develop. They specialize in helping you better understand your condition, slow the progression of your disease, improve the length and quality of your life, help you detect future heart problems before they become life threatening, and avoid hospitalizations.  As always, thank you for trusting us with your health care needs!                Follow-ups after your visit        Follow-up notes from your care team     Return in about 1 month (around 12/30/2017).      Your next 10 appointments already scheduled     Dec 04, 2017  9:45 AM CST   Anticoagulation Visit with OX ANTICOAGULATION CLINIC   [facility] ([facility])    [address] [address]   [phone]            Dec 14, 2017 10:00 AM CST   Lab with Allmoxy LAB   [facility] ([facility])    [address] [place] [address]   [phone]            Dec 20, 2017  7:30 AM CST   Lab with REHAN LAB   [facility] ([facility])    [address] [place] [address]   [phone]            Dec 20, 2017  8:00 AM CST   (Arrive by 7:45 AM)   CORE RETURN with RUBI Stanley CNP   [facility] ([facility])    [address] [place] [address]   [phone]            Feb 26, 2018 11:00 AM CST   (Arrive by 10:45 AM)   Implanted Defibulator with  Cv Device 1   [facility] ([facility])    [address] [place] [address]   [phone]            Feb 26, 2018 11:30 AM CST   (Arrive by 11:15 AM)   RETURN ARRHYTHMIA with RUBI Carreon CNP   [facility] ([facility])  "   [address] [address] [phone]              Future tests that were ordered for you today     Open Future Orders        Priority Expected Expires Ordered    Basic metabolic panel Routine 2017    Digoxin level Routine 2017            Who to contact     If you have questions or need follow up information about today's clinic visit or your schedule please contact [facility] directly at [phone].  Normal or non-critical lab and imaging results will be communicated to you by [employer], letter or phone within 4 business days after the clinic has received the results. If you do not hear from us within 7 days, please contact the clinic through [employer] or phone. If you have a critical or abnormal lab result, we will notify you by phone as soon as possible.  Submit refill requests through DearJane or call your pharmacy and they will forward the refill request to us. Please allow 3 business days for your refill to be completed.          Additional Information About Your Visit        DearJane Information     DearJane lets you send messages to your doctor, view your test results, renew your prescriptions, schedule appointments and more. To sign up, go to [email or web address] . Click on \"Log in\" on the left side of the screen, which will take you to the Welcome page. Then click on \"Sign up Now\" on the right side of the page.     You will be asked to enter the access code listed below, as well as some personal information. Please follow the directions to create your username and password.     Your access code is: [email or web address]  Expires: 2017  5:30 AM     Your access code will  in 90 days. If you need help or a new code, please call your [place] clinic or [phone].        Care EveryWhere ID     This is your Care EveryWhere ID. This could be used by other organizations to access your [place] medical " "records  [id number]        Your Vitals Were     Pulse Height Pulse Oximetry BMI (Body Mass Index)          96 1.575 m (5' 2\") 99% 22.66 kg/m2         Blood Pressure from Last 3 Encounters:   11/30/17 100/74   11/14/17 104/71   11/08/17 130/90    Weight from Last 3 Encounters:   11/30/17 56.2 kg (123 lb 14.4 oz)   11/14/17 54.9 kg (121 lb)   11/08/17 55.6 kg (122 lb 8 oz)                 Today's Medication Changes          These changes are accurate as of: 11/30/17 11:38 AM.  If you have any questions, ask your nurse or doctor.               These medicines have changed or have updated prescriptions.        Dose/Directions    bumetanide 0.5 MG tablet   Commonly known as:  BUMEX   This may have changed:  when to take this   Used for:  Ischemic cardiomyopathy   Changed by:  Emily Collado NP        Dose:  0.5 mg   Take 1 tablet (0.5 mg) by mouth every other day   Quantity:  45 tablet   Refills:  3       potassium chloride SA 20 MEQ CR tablet   Commonly known as:  K-DUR/KLOR-CON M   This may have changed:  when to take this   Used for:  Ischemic cardiomyopathy   Changed by:  Emily Collado NP        Dose:  20 mEq   Take 1 tablet (20 mEq) by mouth daily   Quantity:  90 tablet   Refills:  3            Where to get your medicines      These medications were sent to [facility]/pharmacy #[id number] [place] [id number] [address]     Phone:  [phone]     bumetanide 0.5 MG tablet    potassium chloride SA 20 MEQ CR tablet                Primary Care Provider Office Phone # Fax #    Shanna Church [phone]       [address] [facility] [address]        Equal Access to Services     CUBA AYALA : Enedelia Justin, waalfonzoda luqadaha, qaybta kaalmada nirmal, karen rosen. So [facility] [phone].    ATENCIÓN: Si habla español, tiene a suarez disposición servicios gratuitos de asistencia lingüística. Llame al " [phone].    We comply with applicable federal civil rights laws and [place] laws. We do not discriminate on the basis of race, color, national origin, age, disability, sex, sexual orientation, or gender identity.            Thank you!     Thank you for choosing [facility]  for your care. Our goal is always to provide you with excellent care. Hearing back from our patients is one way we can continue to improve our services. Please take a few minutes to complete the written survey that you may receive in the mail after your visit with us. Thank you!             Your Updated Medication List - Protect others around you: Learn how to safely use, store and throw away your medicines at www.disposemymeds.org.          This list is accurate as of: 11/30/17 11:38 AM.  Always use your most recent med list.                   Brand Name Dispense Instructions for use Diagnosis    acetaminophen 500 MG tablet    TYLENOL    30 tablet    Take 2 tablets (1,000 mg) by mouth every 6 hours as needed for mild pain    Lower extremity pain, bilateral, Shakiness       acetaminophen-codeine 300-30 MG per tablet    TYLENOL #3    28 tablet    Take 1-2 tablets by mouth every 4 hours as needed for moderate pain    S/P ICD (internal cardiac defibrillator) procedure       alcohol swab prep pads     100 each    Use to swab area of injection/mary alice as directed 3 x per day        ascorbic acid 500 MG Tabs     2 tablet    Take 1 tablet (500 mg) by mouth daily    Coronary artery disease involving native coronary artery of native heart without angina pectoris       ASPIRIN NOT PRESCRIBED    INTENTIONAL    0 each    Please choose reason not prescribed, below    Cardiogenic shock (H), Type 2 diabetes mellitus without complication, with long-term current use of insulin (H)       atorvastatin 40 MG tablet    LIPITOR    60 tablet    1 tablet (40 mg) by Oral or Feeding Tube route daily    Coronary artery disease involving native coronary artery  of native heart without angina pectoris, Cardiogenic shock (H)       baclofen 10 MG tablet    LIORESAL    90 tablet    Take 1 tablet (10 mg) by mouth 3 times daily    Lower limb symptom       beta carotene 38633 UNIT capsule     2 capsule    Take 1 capsule (25,000 Units) by mouth daily    Ischemic cardiomyopathy       bumetanide 0.5 MG tablet    BUMEX    45 tablet    Take 1 tablet (0.5 mg) by mouth every other day    Ischemic cardiomyopathy       cetirizine 10 MG tablet    zyrTEC    60 tablet    Take 1 tablet (10 mg) by mouth daily    Seasonal allergic rhinitis       clopidogrel 75 MG tablet    PLAVIX    60 tablet    Take 1 tablet (75 mg) by mouth daily    ST elevation myocardial infarction (STEMI), unspecified artery (H)       digoxin 125 MCG tablet    LANOXIN    60 tablet    Take 1 tablet (125 mcg) by mouth daily    Coronary artery disease involving native coronary artery of native heart without angina pectoris, Cardiogenic shock (H)       finasteride 5 MG tablet    PROSCAR    60 tablet    Take 1 tablet (5 mg) by mouth daily    Hypertrophy of prostate with urinary obstruction       insulin glargine 100 UNIT/ML injection    LANTUS    18 mL    Inject 25 Units Subcutaneous every morning (before breakfast)        insulin pen needle 32G X 4 MM    BD KYLEE U/F    100 each    Use 3 daily or as directed.    Diabetes mellitus type 2, insulin dependent (H)       lisinopril 2.5 MG tablet    PRINIVIL/Zestril    30 tablet    Take 1 tablet (2.5 mg) by mouth daily    Ischemic cardiomyopathy       METOPROLOL SUCCINATE ER PO      Take 25 mg by mouth daily        ONETOUCH ULTRA test strip   Generic drug:  blood glucose monitoring     100 strip    USE TO TEST BLOOD SUGAR 3 TIMES DAILY OR AS DIRECTED.    Diabetes mellitus type 2, insulin dependent (H)       oxyCODONE-acetaminophen 5-325 MG per tablet    PERCOCET    28 tablet    Take 1 tablet by mouth every 4 hours as needed for other (mild or moderate pain)    S/P ICD (internal  cardiac defibrillator) procedure       pantoprazole 40 MG EC tablet    PROTONIX    60 tablet    Take 1 tablet (40 mg) by mouth daily    Gastrointestinal hemorrhage associated with other gastritis       potassium chloride SA 20 MEQ CR tablet    K-DUR/KLOR-CON M    90 tablet    Take 1 tablet (20 mEq) by mouth daily    Ischemic cardiomyopathy       Sharps Container Misc     1 each    1 each every 30 days    Diabetes mellitus type 2, insulin dependent (H)       tamsulosin 0.4 MG capsule    FLOMAX    60 capsule    Take 1 capsule (0.4 mg) by mouth daily    Ischemic cardiomyopathy       * Warfarin Therapy Reminder      1 each continuous prn    Deep vein thrombosis (DVT) of left lower extremity, unspecified chronicity, unspecified vein (H)       * warfarin 3 MG tablet    COUMADIN    100 tablet    Take one tablet daily as directed by the ACC    Deep vein thrombosis (DVT) of left lower extremity, unspecified chronicity, unspecified vein (H)       zinc sulfate 220 (50 ZN) MG capsule    ZINCATE    2 capsule    Take 1 capsule (220 mg) by mouth daily    Ischemic cardiomyopathy       * Notice:  This list has 2 medication(s) that are the same as other medications prescribed for you. Read the directions carefully, and ask your doctor or other care provider to review them with you.

## 2017-11-30 NOTE — MR AVS SNAPSHOT
After Visit Summary   11/30/2017    Luke Henao    MRN: [id number]           Patient Information     Date Of Birth          1965        Visit Information        Provider Department      11/30/2017 10:15 AM Cristobal, Son; 1, Uc Cv Device [facility]        Today's Diagnoses     Ischemic cardiomyopathy    -  1      Care Instructions    It was a pleasure to see you in clinic today. Please do not hesitate to call with any questions or concerns. We look forward to seeing you in clinic at your next device check in 3 months.    Mony Reed, RN  Electrophysiology Nurse Clinician  [facility]  During business hours call:  [phone]  After business hours please call: [phone]- select option #4 and ask for job code [phone].            Follow-ups after your visit        Follow-up notes from your care team     Return in about 3 months (around 2/28/2018).      Your next 10 appointments already scheduled     Nov 30, 2017 10:45 AM CST   CORE RETURN with Emily Collado NP   [facility])    [address] [place] [address] [phone]            Dec 04, 2017 10:00 AM CST   Anticoagulation [facility] with [facility]    [address] [id number] [phone]            Feb 26, 2018 11:00 AM CST   (Arrive by 10:45 AM)   Implanted Defibulator with Uc Cv Device 1   [facility] ([facility])    [address] [address] [phone]            Feb 26, 2018 11:30 AM CST   (Arrive by 11:15 AM)   RETURN ARRHYTHMIA with RUBI Carreon CNP   [facility])    [address] [place] [address] [phone]              Who to contact     If you have  "questions or need follow up information about today's clinic visit or your schedule please contact [facility] directly at [phone].  Normal or non-critical lab and imaging results will be communicated to you by NIhart, letter or phone within 4 business days after the clinic has received the results. If you do not hear from us within 7 days, please contact the clinic through NIhart or phone. If you have a critical or abnormal lab result, we will notify you by phone as soon as possible.  Submit refill requests through [employer] or call your pharmacy and they will forward the refill request to us. Please allow 3 business days for your refill to be completed.          Additional Information About Your Visit        NIharC[employer]verse Information     [employer] lets you send messages to your doctor, view your test results, renew your prescriptions, schedule appointments and more. To sign up, go to [email or web address] . Click on \"Log in\" on the left side of the screen, which will take you to the Welcome page. Then click on \"Sign up Now\" on the right side of the page.     You will be asked to enter the access code listed below, as well as some personal information. Please follow the directions to create your username and password.     Your access code is: [email or web address]  Expires: 2017  5:30 AM     Your access code will  in 90 days. If you need help or a new code, please call your [place] clinic or [phone].        Care EveryWhere ID     This is your Care EveryWhere ID. This could be used by other organizations to access your [place] medical records  [id number]         Blood Pressure from Last 3 Encounters:   17 104/71   17 130/90   17 100/80    Weight from Last 3 Encounters:   17 54.9 kg (121 lb)   17 55.6 kg (122 lb 8 oz)   17 53.8 kg (118 lb 8 oz)              We Performed the Following     ICD DEVICE PROGRAMMING EVAL, SINGLE LEAD ICD        Primary Care Provider " Office Phone # Fax #    Shanna Church [phone]       [address] [facility] [address]        Equal Access to Services     ABELARDODALTON JAMIE : Hadruby linda el gioo [place], waaxda luqadaha, qaybta kanikitada nirmal, karen cordova lahersonjoann rosen. So [facility] [phone].    ATENCIÓN: Si habla español, tiene a suarez disposición servicios gratuitos de asistencia lingüística. Llame al [phone].    We comply with applicable federal civil rights laws and [place] laws. We do not discriminate on the basis of race, color, national origin, age, disability, sex, sexual orientation, or gender identity.            Thank you!     Thank you for choosing [facility]  for your care. Our goal is always to provide you with excellent care. Hearing back from our patients is one way we can continue to improve our services. Please take a few minutes to complete the written survey that you may receive in the mail after your visit with us. Thank you!             Your Updated Medication List - Protect others around you: Learn how to safely use, store and throw away your medicines at www.disposemymeds.org.          This list is accurate as of: 11/30/17 10:32 AM.  Always use your most recent med list.                   Brand Name Dispense Instructions for use Diagnosis    acetaminophen 500 MG tablet    TYLENOL    30 tablet    Take 2 tablets (1,000 mg) by mouth every 6 hours as needed for mild pain    Lower extremity pain, bilateral, Shakiness       acetaminophen-codeine 300-30 MG per tablet    TYLENOL #3    28 tablet    Take 1-2 tablets by mouth every 4 hours as needed for moderate pain    S/P ICD (internal cardiac defibrillator) procedure       alcohol swab prep pads     100 each    Use to swab area of injection/mary alice as directed 3 x per day        ascorbic acid 500 MG Tabs     2 tablet    Take 1 tablet (500 mg) by mouth daily    Coronary artery disease involving native coronary artery of native  heart without angina pectoris       ASPIRIN NOT PRESCRIBED    INTENTIONAL    0 each    Please choose reason not prescribed, below    Cardiogenic shock (H), Type 2 diabetes mellitus without complication, with long-term current use of insulin (H)       atorvastatin 40 MG tablet    LIPITOR    60 tablet    1 tablet (40 mg) by Oral or Feeding Tube route daily    Coronary artery disease involving native coronary artery of native heart without angina pectoris, Cardiogenic shock (H)       baclofen 10 MG tablet    LIORESAL    90 tablet    Take 1 tablet (10 mg) by mouth 3 times daily    Lower limb symptom       beta carotene 83435 UNIT capsule     2 capsule    Take 1 capsule (25,000 Units) by mouth daily    Ischemic cardiomyopathy       bumetanide 0.5 MG tablet    BUMEX    120 tablet    Take 1 tablet (0.5 mg) by mouth daily    Ischemic cardiomyopathy       cetirizine 10 MG tablet    zyrTEC    60 tablet    Take 1 tablet (10 mg) by mouth daily    Seasonal allergic rhinitis       clopidogrel 75 MG tablet    PLAVIX    60 tablet    Take 1 tablet (75 mg) by mouth daily    ST elevation myocardial infarction (STEMI), unspecified artery (H)       digoxin 125 MCG tablet    LANOXIN    60 tablet    Take 1 tablet (125 mcg) by mouth daily    Coronary artery disease involving native coronary artery of native heart without angina pectoris, Cardiogenic shock (H)       finasteride 5 MG tablet    PROSCAR    60 tablet    Take 1 tablet (5 mg) by mouth daily    Hypertrophy of prostate with urinary obstruction       insulin glargine 100 UNIT/ML injection    LANTUS    18 mL    Inject 25 Units Subcutaneous every morning (before breakfast)        insulin pen needle 32G X 4 MM    BD KYLEE U/F    100 each    Use 3 daily or as directed.    Diabetes mellitus type 2, insulin dependent (H)       lisinopril 2.5 MG tablet    PRINIVIL/Zestril    30 tablet    Take 1 tablet (2.5 mg) by mouth daily    Ischemic cardiomyopathy       METOPROLOL SUCCINATE ER PO       Take 25 mg by mouth daily        ONETOUCH ULTRA test strip   Generic drug:  blood glucose monitoring     100 strip    USE TO TEST BLOOD SUGAR 3 TIMES DAILY OR AS DIRECTED.    Diabetes mellitus type 2, insulin dependent (H)       oxyCODONE-acetaminophen 5-325 MG per tablet    PERCOCET    28 tablet    Take 1 tablet by mouth every 4 hours as needed for other (mild or moderate pain)    S/P ICD (internal cardiac defibrillator) procedure       pantoprazole 40 MG EC tablet    PROTONIX    60 tablet    Take 1 tablet (40 mg) by mouth daily    Gastrointestinal hemorrhage associated with other gastritis       potassium chloride SA 20 MEQ CR tablet    K-DUR/KLOR-CON M    120 tablet    Take 1 tablet (20 mEq) by mouth 2 times daily    Ischemic cardiomyopathy       Sharps Container Misc     1 each    1 each every 30 days    Diabetes mellitus type 2, insulin dependent (H)       tamsulosin 0.4 MG capsule    FLOMAX    60 capsule    Take 1 capsule (0.4 mg) by mouth daily    Ischemic cardiomyopathy       * Warfarin Therapy Reminder      1 each continuous prn    Deep vein thrombosis (DVT) of left lower extremity, unspecified chronicity, unspecified vein (H)       * warfarin 3 MG tablet    COUMADIN    100 tablet    Take one tablet daily as directed by the ACC    Deep vein thrombosis (DVT) of left lower extremity, unspecified chronicity, unspecified vein (H)       zinc sulfate 220 (50 ZN) MG capsule    ZINCATE    2 capsule    Take 1 capsule (220 mg) by mouth daily    Ischemic cardiomyopathy       * Notice:  This list has 2 medication(s) that are the same as other medications prescribed for you. Read the directions carefully, and ask your doctor or other care provider to review them with you.

## 2017-12-04 ENCOUNTER — ANTICOAGULATION THERAPY VISIT (OUTPATIENT)
Dept: ANTICOAGULATION | Facility: CLINIC | Age: 52
End: 2017-12-04
Payer: COMMERCIAL

## 2017-12-04 DIAGNOSIS — Z79.01 LONG-TERM (CURRENT) USE OF ANTICOAGULANTS: ICD-10-CM

## 2017-12-04 LAB — INR POINT OF CARE: 2.5 (ref 0.86–1.14)

## 2017-12-04 PROCEDURE — 85610 PROTHROMBIN TIME: CPT | Mod: QW

## 2017-12-04 PROCEDURE — 36416 COLLJ CAPILLARY BLOOD SPEC: CPT

## 2017-12-04 NOTE — PROGRESS NOTES
ANTICOAGULATION FOLLOW-UP CLINIC VISIT    Patient Name:  Luke Henao  Date:  12/4/2017  Contact Type:  Face to Face    SUBJECTIVE:     Patient Findings     Positives No Problem Findings           OBJECTIVE    INR Protime   Date Value Ref Range Status   12/04/2017 2.5 (A) 0.86 - 1.14 Final       ASSESSMENT / PLAN  INR assessment THER    Recheck INR In: 3 WEEKS    INR Location Clinic      Anticoagulation Summary as of 12/4/2017     INR goal 2.0-3.0   Today's INR 2.5   Maintenance plan 1.5 mg (3 mg x 0.5) on Mon, Wed, Fri; 3 mg (3 mg x 1) all other days   Full instructions 1.5 mg on Mon, Wed, Fri; 3 mg all other days   Weekly total 16.5 mg   No change documented Pina Coyle RN   Plan last modified Pina Coyle RN (11/13/2017)   Next INR check 12/26/2017   Target end date     Indications   Cerebrovascular accident (CVA) due to thrombosis of cerebral artery (H) (Resolved) [I63.30]  DVT (deep venous thrombosis) (H) [I82.409]  Long-term (current) use of anticoagulants [Z79.01] [Z79.01]         Anticoagulation Episode Summary     INR check location [facility]    Preferred lab     Send INR reminders to [facility]    Comments             See the Encounter Report to view Anticoagulation Flowsheet and Dosing Calendar (Go to Encounters tab in chart review, and find the Anticoagulation Therapy Visit)        Pina Coyle RN

## 2017-12-04 NOTE — MR AVS SNAPSHOT
Luke Henao   12/4/2017 9:45 AM   Anticoagulation Therapy Visit    Description:  52 year old male   Provider:  [employer] [employer];  ANTICOAGULATION CLINIC   Department:   Anti Coagulation           INR as of 12/4/2017     Today's INR 2.5      Anticoagulation Summary as of 12/4/2017     INR goal 2.0-3.0   Today's INR 2.5   Full instructions 1.5 mg on Mon, Wed, Fri; 3 mg all other days   Next INR check 12/26/2017    Indications   Cerebrovascular accident (CVA) due to thrombosis of cerebral artery (H) (Resolved) [I63.30]  DVT (deep venous thrombosis) (H) [I82.409]  Long-term (current) use of anticoagulants [Z79.01] [Z79.01]         Your next Anticoagulation Clinic appointment(s)     Dec 26, 2017 10:00 AM CST   Anticoagulation Visit with  ANTICOAGULATION CLINIC   [facility]    [address] [address] [phone]              Contact Numbers     [facility]  Please call  [phone] to cancel and/or reschedule your appointment   Please call  [phone] with any problems or questions regarding your therapy.        December 2017 Details    Sun Mon Tue Wed Thu Fri Sat          1               2                 3               4      1.5 mg   See details      5      3 mg         6      1.5 mg         7      3 mg         8      1.5 mg         9      3 mg           10      3 mg         11      1.5 mg         12      3 mg         13      1.5 mg         14      3 mg         15      1.5 mg         16      3 mg           17      3 mg         18      1.5 mg         19      3 mg         20      1.5 mg         21      3 mg         22      1.5 mg         23      3 mg           24      3 mg         25      1.5 mg         26            27               28               29               30                 31                      Date Details   12/04 This INR check       Date of next INR:  12/26/2017         How to take your  warfarin dose     To take:  1.5 mg Take 0.5 of a 3 mg tablet.    To take:  3 mg Take 1 of the 3 mg tablets.

## 2017-12-15 ENCOUNTER — RADIANT APPOINTMENT (OUTPATIENT)
Dept: CARDIOLOGY | Facility: CLINIC | Age: 52
End: 2017-12-15
Attending: INTERNAL MEDICINE
Payer: COMMERCIAL

## 2017-12-15 VITALS
SYSTOLIC BLOOD PRESSURE: 115 MMHG | BODY MASS INDEX: 22.56 KG/M2 | HEIGHT: 62 IN | DIASTOLIC BLOOD PRESSURE: 79 MMHG | HEART RATE: 85 BPM | WEIGHT: 122.6 LBS | OXYGEN SATURATION: 99 %

## 2017-12-15 DIAGNOSIS — I25.5 ISCHEMIC CARDIOMYOPATHY: Primary | ICD-10-CM

## 2017-12-15 DIAGNOSIS — I25.5 ISCHEMIC CARDIOMYOPATHY: ICD-10-CM

## 2017-12-15 DIAGNOSIS — I34.0 MITRAL VALVE INSUFFICIENCY, UNSPECIFIED ETIOLOGY: ICD-10-CM

## 2017-12-15 LAB
ANION GAP SERPL CALCULATED.3IONS-SCNC: 7 MMOL/L (ref 3–14)
BUN SERPL-MCNC: 23 MG/DL (ref 7–30)
CALCIUM SERPL-MCNC: 8.7 MG/DL (ref 8.5–10.1)
CHLORIDE SERPL-SCNC: 105 MMOL/L (ref 94–109)
CO2 SERPL-SCNC: 26 MMOL/L (ref 20–32)
CREAT SERPL-MCNC: 1.3 MG/DL (ref 0.66–1.25)
DIGOXIN SERPL-MCNC: 1 UG/L (ref 0.5–2)
GFR SERPL CREATININE-BSD FRML MDRD: 58 ML/MIN/1.7M2
GLUCOSE SERPL-MCNC: 140 MG/DL (ref 70–99)
POTASSIUM SERPL-SCNC: 4.2 MMOL/L (ref 3.4–5.3)
SODIUM SERPL-SCNC: 138 MMOL/L (ref 133–144)

## 2017-12-15 PROCEDURE — 99213 OFFICE O/P EST LOW 20 MIN: CPT | Mod: ZF

## 2017-12-15 PROCEDURE — 99214 OFFICE O/P EST MOD 30 MIN: CPT | Mod: 25 | Performed by: INTERNAL MEDICINE

## 2017-12-15 PROCEDURE — 36415 COLL VENOUS BLD VENIPUNCTURE: CPT | Performed by: NURSE PRACTITIONER

## 2017-12-15 PROCEDURE — 80162 ASSAY OF DIGOXIN TOTAL: CPT | Performed by: NURSE PRACTITIONER

## 2017-12-15 PROCEDURE — 80048 BASIC METABOLIC PNL TOTAL CA: CPT | Performed by: NURSE PRACTITIONER

## 2017-12-15 ASSESSMENT — PAIN SCALES - GENERAL: PAINLEVEL: NO PAIN (0)

## 2017-12-15 NOTE — LETTER
12/15/2017      RE: Luke Henao  [address]  [facility] [address]       Dear Colleague,    Thank you for the opportunity to participate in the care of your patient, Luke Henao, at the [facility] at [facility]. Please see a copy of my visit note below.      CARDIOLOGY CONSULTATION      ASSESSMENT AND PLAN:  We had the pleasure of meeting Luke Henao today, as you know he is a 52 year old male with ischemic cardiomyopathy with reduce EF, s/p complex percutaneous revascularization, and hx severe ischemic MR s/p MitralClip placement with marked improvement in symptoms who is  being seen today for evaluation of ongoing cardiac problems. Mr. Henao reports feeling well today, he has started working again and does not notice any limitations. He is tolerating all his medications and denies any ongoing dizzyness or dyspnea. We are very happy to see how well Mr. Henao has recovered.     Mr. Henao TTE today showed mild MR with mean PG across MV of 5 mm Hg at . He continues to have akinesis of multiple LV segments with a residual EF of 32% (calculated biplane). At this time we should continue further uptitration of guideline directed therapy. He did have an ICD implanted on Oct 27. He feels great after that.    # Ischemic Cardiomyopathy, EF 32%, NYHA class 1. Well compensated today.  -- Continue toprol, digoxin   -- Patient has been holding Acei and ARB do to renal dysfunction  -- contineu atorvastatin, and plavix and warfarin  -- s/p ICD implantation. Doing well.      # MR s/p MitralClip  Good residual function. Mild MR  No changes at this time    # DVT  Continue warfarin    # ED  The patient will follow up with his PCP for ED medications.    Review in 8 weeks with Vishal Schaffer MD, for optimizing his HF medications.       HPI: Mr. Luke Henao is a 51-year-old Filipino gentleman with Hx of tobacco use and DM, who presented to [facility] 03/26 with RCA STEMI  transferred to [facility] after POBA, for further workup and management of cardiogenic shock, which led to a 6-week hospital stay  (3/29/17-5/12/17), revascularization with  DESx7 to RCA (culprit), LCx (, now closed) and LAD on 3/27, with refractory cardiogenic shock requiring intraaortic balloon pump, eventually subclavian balloon pump, multiple episodes of PEDRO, sepsis, sacral ulcer, DVT on a/c, b/l hemispheric infarcts likely due to watershed ischemia,  eventually refractory CS was attributed to ischemic MR on iCM (EF25-30%), and pt was treated with percutaneous MV repair with MitraClip (5/1/17), which led to reduction in MR from severe to mild, and substantial clinic improvement, weaning of IABP, and eventual discharge to rehabilitation, where pt stayed from 5/12/17 -5/26/17. Mr Henao seems to have made excellent progress in rehabilitation, and has now been living at home for the past 2 weeks. He presents to clinic with his wife and an interpretor, and seems to be in excellent spirits. His exercise tolerance is essentially not limited by dyspnea, and he has no signs of volume overload. His echo today showed MitraClip in appropriate position, minimal MR, acceptable inflow gradient, and moderately reduced EF (30-35%) with inferior akinesis.     Feels no limitations. Doing well.      The patient denies a history of chest discomfort, dyspnea, PND, orthopnea, pedal edema, palpitations, lightheadedness, and syncope.      PAST MEDICAL HISTORY:  Past Medical History:   Diagnosis Date     CAD (coronary artery disease) 2017    RCA STEMI s/p balloon angioplasty and multiple Sofie to RCA, LCx, and LAD     DVT (deep venous thrombosis) (H) 2017    left internal jugular (line-associated); left common femoral; on coumadin     Ischemic cardiomyopathy 2017    EF 30-35%     Ischemic stroke (H) 2017    no residual deficits     Lower GI bleed 2017    due to rectal ulcer     Mitral valve insufficiency,  ischemic 2017    s/p Mitral Clip placement      Type 2 diabetes mellitus (H)        CURRENT MEDICATIONS:  Current Outpatient Prescriptions   Medication Sig Dispense Refill     potassium chloride SA (K-DUR/KLOR-CON M) 20 MEQ CR tablet Take 1 tablet (20 mEq) by mouth daily 90 tablet 3     bumetanide (BUMEX) 0.5 MG tablet Take 1 tablet (0.5 mg) by mouth every other day 45 tablet 3     lisinopril (PRINIVIL/ZESTRIL) 2.5 MG tablet Take 1 tablet (2.5 mg) by mouth daily 30 tablet 1     ONETOUCH ULTRA test strip USE TO TEST BLOOD SUGAR 3 TIMES DAILY OR AS DIRECTED. 100 strip 3     METOPROLOL SUCCINATE ER PO Take 25 mg by mouth daily       warfarin (COUMADIN) 3 MG tablet Take one tablet daily as directed by the  tablet 0     digoxin (LANOXIN) 125 MCG tablet Take 1 tablet (125 mcg) by mouth daily 60 tablet 5     insulin pen needle (BD KYLEE U/F) 32G X 4 MM Use 3 daily or as directed. 100 each prn     alcohol swab prep pads Use to swab area of injection/mary alice as directed 3 x per day 100 each 11     insulin glargine (LANTUS) 100 UNIT/ML injection Inject 25 Units Subcutaneous every morning (before breakfast) 18 mL 3     atorvastatin (LIPITOR) 40 MG tablet 1 tablet (40 mg) by Oral or Feeding Tube route daily 60 tablet 7     cetirizine (ZYRTEC) 10 MG tablet Take 1 tablet (10 mg) by mouth daily 60 tablet 11     clopidogrel (PLAVIX) 75 MG tablet Take 1 tablet (75 mg) by mouth daily 60 tablet 11     finasteride (PROSCAR) 5 MG tablet Take 1 tablet (5 mg) by mouth daily 60 tablet 11     pantoprazole (PROTONIX) 40 MG EC tablet Take 1 tablet (40 mg) by mouth daily 60 tablet 11     zinc sulfate (ZINCATE) 220 (50 ZN) MG capsule Take 1 capsule (220 mg) by mouth daily 2 capsule 0     Sharps Container MISC 1 each every 30 days 1 each 0     Warfarin Therapy Reminder 1 each continuous prn       acetaminophen-codeine (TYLENOL #3) 300-30 MG per tablet Take 1-2 tablets by mouth every 4 hours as needed for moderate pain (Patient not taking:  Reported on 12/15/2017) 28 tablet 0     oxyCODONE-acetaminophen (PERCOCET) 5-325 MG per tablet Take 1 tablet by mouth every 4 hours as needed for other (mild or moderate pain) (Patient not taking: Reported on 12/15/2017) 28 tablet 0     baclofen (LIORESAL) 10 MG tablet Take 1 tablet (10 mg) by mouth 3 times daily (Patient not taking: Reported on 11/30/2017) 90 tablet 3     acetaminophen (TYLENOL) 500 MG tablet Take 2 tablets (1,000 mg) by mouth every 6 hours as needed for mild pain (Patient not taking: Reported on 12/15/2017) 30 tablet 0     ASPIRIN NOT PRESCRIBED (INTENTIONAL) Please choose reason not prescribed, below (Patient not taking: Reported on 11/30/2017) 0 each 0     ascorbic acid 500 MG TABS Take 1 tablet (500 mg) by mouth daily (Patient not taking: Reported on 12/15/2017) 2 tablet 0     beta carotene 13661 UNIT capsule Take 1 capsule (25,000 Units) by mouth daily (Patient not taking: Reported on 12/15/2017) 2 capsule 0     tamsulosin (FLOMAX) 0.4 MG capsule Take 1 capsule (0.4 mg) by mouth daily (Patient not taking: Reported on 12/15/2017) 60 capsule 0       PAST SURGICAL HISTORY:  Past Surgical History:   Procedure Laterality Date     C INSERT ELECTRD LEADS/REPOSTION  10/27/2017          COLONOSCOPY N/A 4/17/2017    Procedure: COLONOSCOPY;  Surgeon: Rashaad Bundy MD;  Location: [facility]     INSERT INTRAAORTIC BALLOON PUMP Right 4/19/2017    Procedure: INSERT INTRAAORTIC BALLOON PUMP;  Right Subclavian Intra Aortic Balloon Pump Insertion using Maquet 40cc Ballon Catheter, Implentation of 8mm Gelweave Woven Vascular Prosthesis, Removal of Left Femoral Ballon Pump Catheter, Flouroscopy;  Surgeon: Keshav Leung MD;  Location: [facility]     PERCUTANEOUS MITRAL VALVE REPAIR N/A 5/1/2017    Procedure: PERCUTANEOUS MITRAL VALVE REPAIR ANESTHESIA;  Mitraclip Procedure Possible Cardiopulmonary Bypass ;  Surgeon: Ron Cortez MD;  Location: [facility] OR     SUBCLAVIAN AORTIC VALVE IMPLANT N/A  "5/8/2017    Procedure: SUBCLAVIAN AORTIC VALVE IMPLANT;  Right Subclavian Graft Removal ;  Surgeon: Keshav Leung MD;  Location: [facility] OR       ALLERGIES  Review of patient's allergies indicates no known allergies.    FAMILY HX:  Family History   Problem Relation Age of Onset     Hypertension Mother      Type 2 Diabetes Father      Hypertension Father      Myocardial Infarction No family hx of      CEREBROVASCULAR DISEASE No family hx of      Coronary Artery Disease Early Onset No family hx of      Colon Cancer No family hx of      Prostate Cancer No family hx of        SOCIAL HX:  Social History     Social History     Marital status:      Spouse name: N/A     Number of children: N/A     Years of education: N/A     Social History Main Topics     Smoking status: Former Smoker     Packs/day: 0.50     Types: Cigarettes     Smokeless tobacco: Not on file     Alcohol use No     Drug use: No     Sexual activity: Not Currently     Partners: Female     Other Topics Concern     Not on file     Social History Narrative       ROS:  Constitutional: No fever, chills, or sweats. No weight gain/loss.   ENT: No visual disturbance, ear ache, epistaxis, sore throat.   Allergies/Immunologic: Negative.   Respiratory: No cough, hemoptysis.   Cardiovascular: As per HPI.   GI: No nausea, vomiting, hematemesis, melena, or hematochezia.   : No urinary frequency, dysuria, or hematuria.   Integument: Negative.   Psychiatric: Negative.   Neuro: Negative.   Endocrinology: Negative.   Musculoskeletal: No myalgia.    VITAL SIGNS:  /79 (BP Location: Left arm, Cuff Size: Adult Regular)  Pulse 85  Ht 1.575 m (5' 2\")  Wt 55.6 kg (122 lb 9.6 oz)  SpO2 99%  BMI 22.42 kg/m2  Body mass index is 22.42 kg/(m^2).  Wt Readings from Last 2 Encounters:   12/15/17 55.6 kg (122 lb 9.6 oz)   11/30/17 56.2 kg (123 lb 14.4 oz)       PHYSICAL EXAM  GEN:in no acute distress.    HEENT: Unremarkable.  Neck: JVP normal.  Carotids +4/4 " bilaterally without bruits.  Lungs: CTA.    Cor: RRR. Normal S1 and S2.  No murmur, rub, or gallop.  PMI in Lf 5th ICS.    Abd: Soft, nontender, nondistended.  N  ABS.  No pulsatile mass.    Extremities: No C/C/E.  Pulses +4/4 symmetric in upper and lower extremities.    Neuro: Grossly intact.    LABS    Lab Results   Component Value Date    WBC 12.4 06/23/2017     Lab Results   Component Value Date    RBC 4.41 06/23/2017     Lab Results   Component Value Date    HGB 13.0 06/23/2017     Lab Results   Component Value Date    HCT 39.8 06/23/2017     No components found for: MCT  Lab Results   Component Value Date    MCV 90 06/23/2017     Lab Results   Component Value Date     06/23/2017      Recent Labs   Lab Test  07/12/17   0930  07/03/17   1559   NA  140  139   POTASSIUM  4.0  4.8   CHLORIDE  104  104   CO2  29  27   ANIONGAP  7  8   GLC  102*  171*   BUN  27  23   CR  1.25  1.26*   ROB  9.3  9.1     Recent Labs   Lab Test  03/27/17   0551   CHOL  128   HDL  36*   LDL  77   TRIG  76   NHDL  92        EKG:      ECHO:   ECHO 5/2/17  Interpretation Summary  S/p trans-catheter [id number] mitral valve repair (TMVR). There is less than  mild residual mitral regurgitation. The mean gradient is 4.7 mmHg at a heart  rate in excess of 115 bpm.  Dilation of the inferior vena cava is present with normal respiratory  variation in diameter. Estimated mean right atrial pressure is 3-8 mmHg.  No pericardial effusion is present.     ECHO 6/8/17  Interpretation Summary  Moderately (EF 30-35%) reduced left ventricular function is present. Inferior  wall akinesis. Basal and mid inferoseptal and inferolateral hypokinesis.  Global right ventricular function is mildly reduced.  S/p trans-catheter [id number] mitral valve repair (TMVR). Mean gradient of  3.4 mmHg at a HR of 96 bpm. Trace to mild mitral insufficiency is present.  The inferior vena cava was normal in size with preserved respiratory  variability.  No pericardial  effusion is present.  Compared to study from 5/2/17 there is no significant change.    08/10/17  Interpretation Summary  Moderately (EF 30-35%) reduced left ventricular function is present (bi-plane  32%). Basal lateral, inferior, and inferoseptal hypokinesis.  Global RV function mildly reduced.  S/p trans-catheter MVR 6/2017. Mild mitral insufficiency is present. Mean  valve gradient 5 mmHg at a heart rate of 104 bpm.  Right ventricular systolic pressure is 40mmHg above the right atrial pressure.  The inferior vena cava is normal in size with preserved respiratory  variability.  No pericardial effusion is present.    Sincerely,     Ron Cortez MD      CC: Shanna Church

## 2017-12-15 NOTE — PROGRESS NOTES
CARDIOLOGY CONSULTATION      ASSESSMENT AND PLAN:  We had the pleasure of meeting Luke Henao today, as you know he is a 52 year old male with ischemic cardiomyopathy with reduce EF, s/p complex percutaneous revascularization, and hx severe ischemic MR s/p MitralClip placement with marked improvement in symptoms who is  being seen today for evaluation of ongoing cardiac problems. Mr. Henao reports feeling well today, he has started working again and does not notice any limitations. He is tolerating all his medications and denies any ongoing dizzyness or dyspnea. We are very happy to see how well Mr. Henao has recovered.     Mr. Henao TTE today showed mild MR with mean PG across MV of 5 mm Hg at . He continues to have akinesis of multiple LV segments with a residual EF of 32% (calculated biplane). At this time we should continue further uptitration of guideline directed therapy. He did have an ICD implanted on Oct 27. He feels great after that.    # Ischemic Cardiomyopathy, EF 32%, NYHA class 1. Well compensated today.  -- Continue toprol, digoxin   -- Patient has been holding Acei and ARB do to renal dysfunction  -- contineu atorvastatin, and plavix and warfarin  -- s/p ICD implantation. Doing well.      # MR s/p MitralClip  Good residual function. Mild MR  No changes at this time    # DVT  Continue warfarin    # ED  The patient will follow up with his PCP for ED medications.    Review in 8 weeks with Vishal Schaffer MD, for optimizing his HF medications.       HPI: Mr. Luke Henao is a 51-year-old [place] gentleman with Hx of tobacco use and DM, who presented to [facility] 03/26 with RCA STEMI transferred to [facility] after POBA, for further workup and management of cardiogenic shock, which led to a 6-week hospital stay  (3/29/17-5/12/17), revascularization with  DESx7 to RCA (culprit), LCx (, now closed) and LAD on 3/27, with refractory cardiogenic shock requiring intraaortic  balloon pump, eventually subclavian balloon pump, multiple episodes of PEDRO, sepsis, sacral ulcer, DVT on a/c, b/l hemispheric infarcts likely due to watershed ischemia,  eventually refractory CS was attributed to ischemic MR on iCM (EF25-30%), and pt was treated with percutaneous MV repair with MitraClip (5/1/17), which led to reduction in MR from severe to mild, and substantial clinic improvement, weaning of IABP, and eventual discharge to rehabilitation, where pt stayed from 5/12/17 -5/26/17. Mr Henao seems to have made excellent progress in rehabilitation, and has now been living at home for the past 2 weeks. He presents to clinic with his wife and an interpretor, and seems to be in excellent spirits. His exercise tolerance is essentially not limited by dyspnea, and he has no signs of volume overload. His echo today showed MitraClip in appropriate position, minimal MR, acceptable inflow gradient, and moderately reduced EF (30-35%) with inferior akinesis.     Feels no limitations. Doing well.      The patient denies a history of chest discomfort, dyspnea, PND, orthopnea, pedal edema, palpitations, lightheadedness, and syncope.      PAST MEDICAL HISTORY:  Past Medical History:   Diagnosis Date     CAD (coronary artery disease) 2017    RCA STEMI s/p balloon angioplasty and multiple Sofie to RCA, LCx, and LAD     DVT (deep venous thrombosis) (H) 2017    left internal jugular (line-associated); left common femoral; on coumadin     Ischemic cardiomyopathy 2017    EF 30-35%     Ischemic stroke (H) 2017    no residual deficits     Lower GI bleed 2017    due to rectal ulcer     Mitral valve insufficiency, ischemic 2017    s/p Mitral Clip placement      Type 2 diabetes mellitus (H)        CURRENT MEDICATIONS:  Current Outpatient Prescriptions   Medication Sig Dispense Refill     potassium chloride SA (K-DUR/KLOR-CON M) 20 MEQ CR tablet Take 1 tablet (20 mEq) by mouth daily 90 tablet 3     bumetanide (BUMEX) 0.5 MG tablet Take  1 tablet (0.5 mg) by mouth every other day 45 tablet 3     lisinopril (PRINIVIL/ZESTRIL) 2.5 MG tablet Take 1 tablet (2.5 mg) by mouth daily 30 tablet 1     ONETOUCH ULTRA test strip USE TO TEST BLOOD SUGAR 3 TIMES DAILY OR AS DIRECTED. 100 strip 3     METOPROLOL SUCCINATE ER PO Take 25 mg by mouth daily       warfarin (COUMADIN) 3 MG tablet Take one tablet daily as directed by the  tablet 0     digoxin (LANOXIN) 125 MCG tablet Take 1 tablet (125 mcg) by mouth daily 60 tablet 5     insulin pen needle (BD KYLEE U/F) 32G X 4 MM Use 3 daily or as directed. 100 each prn     alcohol swab prep pads Use to swab area of injection/mary alice as directed 3 x per day 100 each 11     insulin glargine (LANTUS) 100 UNIT/ML injection Inject 25 Units Subcutaneous every morning (before breakfast) 18 mL 3     atorvastatin (LIPITOR) 40 MG tablet 1 tablet (40 mg) by Oral or Feeding Tube route daily 60 tablet 7     cetirizine (ZYRTEC) 10 MG tablet Take 1 tablet (10 mg) by mouth daily 60 tablet 11     clopidogrel (PLAVIX) 75 MG tablet Take 1 tablet (75 mg) by mouth daily 60 tablet 11     finasteride (PROSCAR) 5 MG tablet Take 1 tablet (5 mg) by mouth daily 60 tablet 11     pantoprazole (PROTONIX) 40 MG EC tablet Take 1 tablet (40 mg) by mouth daily 60 tablet 11     zinc sulfate (ZINCATE) 220 (50 ZN) MG capsule Take 1 capsule (220 mg) by mouth daily 2 capsule 0     Sharps Container MISC 1 each every 30 days 1 each 0     Warfarin Therapy Reminder 1 each continuous prn       acetaminophen-codeine (TYLENOL #3) 300-30 MG per tablet Take 1-2 tablets by mouth every 4 hours as needed for moderate pain (Patient not taking: Reported on 12/15/2017) 28 tablet 0     oxyCODONE-acetaminophen (PERCOCET) 5-325 MG per tablet Take 1 tablet by mouth every 4 hours as needed for other (mild or moderate pain) (Patient not taking: Reported on 12/15/2017) 28 tablet 0     baclofen (LIORESAL) 10 MG tablet Take 1 tablet (10 mg) by mouth 3 times daily (Patient not  taking: Reported on 11/30/2017) 90 tablet 3     acetaminophen (TYLENOL) 500 MG tablet Take 2 tablets (1,000 mg) by mouth every 6 hours as needed for mild pain (Patient not taking: Reported on 12/15/2017) 30 tablet 0     ASPIRIN NOT PRESCRIBED (INTENTIONAL) Please choose reason not prescribed, below (Patient not taking: Reported on 11/30/2017) 0 each 0     ascorbic acid 500 MG TABS Take 1 tablet (500 mg) by mouth daily (Patient not taking: Reported on 12/15/2017) 2 tablet 0     beta carotene 75976 UNIT capsule Take 1 capsule (25,000 Units) by mouth daily (Patient not taking: Reported on 12/15/2017) 2 capsule 0     tamsulosin (FLOMAX) 0.4 MG capsule Take 1 capsule (0.4 mg) by mouth daily (Patient not taking: Reported on 12/15/2017) 60 capsule 0       PAST SURGICAL HISTORY:  Past Surgical History:   Procedure Laterality Date     C INSERT ELECTRD LEADS/REPOSTION  10/27/2017          COLONOSCOPY N/A 4/17/2017    Procedure: COLONOSCOPY;  Surgeon: Rashaad Bundy MD;  Location: [facility]     INSERT INTRAAORTIC BALLOON PUMP Right 4/19/2017    Procedure: INSERT INTRAAORTIC BALLOON PUMP;  Right Subclavian Intra Aortic Balloon Pump Insertion using Maquet 40cc Ballon Catheter, Implentation of 8mm Gelweave Woven Vascular Prosthesis, Removal of Left Femoral Ballon Pump Catheter, Flouroscopy;  Surgeon: Keshav Leung MD;  Location: [facility]     PERCUTANEOUS MITRAL VALVE REPAIR N/A 5/1/2017    Procedure: PERCUTANEOUS MITRAL VALVE REPAIR ANESTHESIA;  Mitraclip Procedure Possible Cardiopulmonary Bypass ;  Surgeon: Ron Cortez MD;  Location: [facility]     SUBCLAVIAN AORTIC VALVE IMPLANT N/A 5/8/2017    Procedure: SUBCLAVIAN AORTIC VALVE IMPLANT;  Right Subclavian Graft Removal ;  Surgeon: Keshav Leung MD;  Location: [facility]       ALLERGIES  Review of patient's allergies indicates no known allergies.    FAMILY HX:  Family History   Problem Relation Age of Onset     Hypertension Mother      Type 2 Diabetes  "Father      Hypertension Father      Myocardial Infarction No family hx of      CEREBROVASCULAR DISEASE No family hx of      Coronary Artery Disease Early Onset No family hx of      Colon Cancer No family hx of      Prostate Cancer No family hx of        SOCIAL HX:  Social History     Social History     Marital status:      Spouse name: N/A     Number of children: N/A     Years of education: N/A     Social History Main Topics     Smoking status: Former Smoker     Packs/day: 0.50     Types: Cigarettes     Smokeless tobacco: Not on file     Alcohol use No     Drug use: No     Sexual activity: Not Currently     Partners: Female     Other Topics Concern     Not on file     Social History Narrative       ROS:  Constitutional: No fever, chills, or sweats. No weight gain/loss.   ENT: No visual disturbance, ear ache, epistaxis, sore throat.   Allergies/Immunologic: Negative.   Respiratory: No cough, hemoptysis.   Cardiovascular: As per HPI.   GI: No nausea, vomiting, hematemesis, melena, or hematochezia.   : No urinary frequency, dysuria, or hematuria.   Integument: Negative.   Psychiatric: Negative.   Neuro: Negative.   Endocrinology: Negative.   Musculoskeletal: No myalgia.    VITAL SIGNS:  /79 (BP Location: Left arm, Cuff Size: Adult Regular)  Pulse 85  Ht 1.575 m (5' 2\")  Wt 55.6 kg (122 lb 9.6 oz)  SpO2 99%  BMI 22.42 kg/m2  Body mass index is 22.42 kg/(m^2).  Wt Readings from Last 2 Encounters:   12/15/17 55.6 kg (122 lb 9.6 oz)   11/30/17 56.2 kg (123 lb 14.4 oz)       PHYSICAL EXAM  GEN:in no acute distress.    HEENT: Unremarkable.  Neck: JVP normal.  Carotids +4/4 bilaterally without bruits.  Lungs: CTA.    Cor: RRR. Normal S1 and S2.  No murmur, rub, or gallop.  PMI in Lf 5th ICS.    Abd: Soft, nontender, nondistended.  N  ABS.  No pulsatile mass.    Extremities: No C/C/E.  Pulses +4/4 symmetric in upper and lower extremities.    Neuro: Grossly intact.    LABS    Lab Results   Component Value " Date    WBC 12.4 06/23/2017     Lab Results   Component Value Date    RBC 4.41 06/23/2017     Lab Results   Component Value Date    HGB 13.0 06/23/2017     Lab Results   Component Value Date    HCT 39.8 06/23/2017     No components found for: MCT  Lab Results   Component Value Date    MCV 90 06/23/2017     Lab Results   Component Value Date     06/23/2017      Recent Labs   Lab Test  07/12/17   0930  07/03/17   1559   NA  140  139   POTASSIUM  4.0  4.8   CHLORIDE  104  104   CO2  29  27   ANIONGAP  7  8   GLC  102*  171*   BUN  27  23   CR  1.25  1.26*   ROB  9.3  9.1     Recent Labs   Lab Test  03/27/17   0551   CHOL  128   HDL  36*   LDL  77   TRIG  76   NHDL  92        EKG:      ECHO:   ECHO 5/2/17  Interpretation Summary  S/p trans-catheter xziq-fa-wokn mitral valve repair (TMVR). There is less than  mild residual mitral regurgitation. The mean gradient is 4.7 mmHg at a heart  rate in excess of 115 bpm.  Dilation of the inferior vena cava is present with normal respiratory  variation in diameter. Estimated mean right atrial pressure is 3-8 mmHg.  No pericardial effusion is present.     ECHO 6/8/17  Interpretation Summary  Moderately (EF 30-35%) reduced left ventricular function is present. Inferior  wall akinesis. Basal and mid inferoseptal and inferolateral hypokinesis.  Global right ventricular function is mildly reduced.  S/p trans-catheter pead-nh-iprv mitral valve repair (TMVR). Mean gradient of  3.4 mmHg at a HR of 96 bpm. Trace to mild mitral insufficiency is present.  The inferior vena cava was normal in size with preserved respiratory  variability.  No pericardial effusion is present.  Compared to study from 5/2/17 there is no significant change.    08/10/17  Interpretation Summary  Moderately (EF 30-35%) reduced left ventricular function is present (bi-plane  32%). Basal lateral, inferior, and inferoseptal hypokinesis.  Global RV function mildly reduced.  S/p trans-catheter MVR 6/2017. Mild  mitral insufficiency is present. Mean  valve gradient 5 mmHg at a heart rate of 104 bpm.  Right ventricular systolic pressure is 40mmHg above the right atrial pressure.  The inferior vena cava is normal in size with preserved respiratory  variability.  No pericardial effusion is present.          Shanna Church

## 2017-12-15 NOTE — NURSING NOTE
Med Reconcile: Reviewed and verified all current medications with the patient. The updated medication list was printed and given to the patient.  Return Appointment: Follow up in 2 months. Patient given instructions regarding scheduling next clinic visit. Patient demonstrated understanding of this information and agreed to call with further questions or concerns.  Patient stated he understood all health information given and agreed to call with further questions or concerns.

## 2017-12-15 NOTE — NURSING NOTE
Chief Complaint   Patient presents with     Follow Up For     4mo f/u labs prior     Vitals were taken and medications were reconciled.     Darren Eaton MA  12:07 PM

## 2017-12-15 NOTE — PATIENT INSTRUCTIONS
Thank you for your visit today.  Please call me with any questions or concerns.   Glenn Sun RN  Cardiology Care Coordinator  [phone], press option 1 then option 3

## 2017-12-15 NOTE — MR AVS SNAPSHOT
After Visit Summary   12/15/2017    Luke Henao    MRN: [id number]           Patient Information     Date Of Birth          1965        Visit Information        Provider Department      12/15/2017 1:15 PM Ron Cortez MD[facility]        Today's Diagnoses     Ischemic cardiomyopathy    -  1      Care Instructions    Thank you for your visit today.  Please call me with any questions or concerns.   Glenn Sun RN  Cardiology Care Coordinator  [phone], press option 1 then option 3          Follow-ups after your visit        Additional Services     Follow-Up with Cardiologist       Please schedule a follow up visit with Dr. Schaffer in 2 months.                  Your next 10 appointments already scheduled     Dec 20, 2017  7:30 AM CST   Lab with [facility] LAB   [facility])    [address] [place] [id number] [phone]            Dec 20, 2017  8:00 AM CST   (Arrive by 7:45 AM)   CORE RETURN with RUBI [facility])    [address] [place] [id number] [phone]            Dec 26, 2017 10:00 AM CST   Anticoagulation Visit with [facility]   [facility]    [address] [place] [id number] [phone]            Feb 16, 2018 10:00 AM CST   (Arrive by 9:45 AM)   New Patient Visit with Vishal Schaffer MD   [facility] ([facility])    [address] [place] [id number] [phone]            Feb 26, 2018 11:00 AM CST   (Arrive by 10:45 AM)   Implanted Defibulator with Uc Cv Device 1   [facility] ([facility])    [address] [place] [address]  [id number]               Feb 26, 2018 11:30 AM CST   (Arrive by 11:15 AM)  "  RETURN ARRHYTHMIA with RUBI Carreon CNP   [facility] ([facility])    [address] [facility]  [address] [facility] [address] [phone]              Future tests that were ordered for you today     Open Future Orders        Priority Expected Expires Ordered    Follow-Up with Cardiologist Routine 2018 3/15/2018 12/15/2017            Who to contact     If you have questions or need follow up information about today's clinic visit or your schedule please contact [facility] directly at [phone].  Normal or non-critical lab and imaging results will be communicated to you by LoftyVistashart, letter or phone within 4 business days after the clinic has received the results. If you do not hear from us within 7 days, please contact the clinic through LoftyVistashart or phone. If you have a critical or abnormal lab result, we will notify you by phone as soon as possible.  Submit refill requests through Skinkers or call your pharmacy and they will forward the refill request to us. Please allow 3 business days for your refill to be completed.          Additional Information About Your Visit        MyChart Information     Skinkers lets you send messages to your doctor, view your test results, renew your prescriptions, schedule appointments and more. To sign up, go to [email or web address] . Click on \"Log in\" on the left side of the screen, which will take you to the Welcome page. Then click on \"Sign up Now\" on the right side of the page.     You will be asked to enter the access code listed below, as well as some personal information. Please follow the directions to create your username and password.     Your access code is: [email or web address]  Expires: 2017  5:30 AM     Your access code will  in 90 days. If you need help or a new code, please call your [facility] clinic or [phone].        Care EveryWhere ID     This is your Care EveryWhere ID. This could be " "used by other organizations to access your [facility] medical records  [id number]        Your Vitals Were     Pulse Height Pulse Oximetry BMI (Body Mass Index)          85 1.575 m (5' 2\") 99% 22.42 kg/m2         Blood Pressure from Last 3 Encounters:   12/15/17 115/79   11/30/17 100/74   11/14/17 104/71    Weight from Last 3 Encounters:   12/15/17 55.6 kg (122 lb 9.6 oz)   11/30/17 56.2 kg (123 lb 14.4 oz)   11/14/17 54.9 kg (121 lb)               Primary Care Provider Office Phone # Fax #    Shanna Church [phone]       [address] [place] [id number]        Equal Access to Services     DALTON AYALA : Hadii linda el hadasho Soomaali, waaxda luqadaha, qaybta kaalmada adeegyada, karen nettles . So [facility] [phone].    ATENCIÓN: Si habla español, tiene a suarez disposición servicios gratuitos de asistencia lingüística. Jannette al [phone].    We comply with applicable federal civil rights laws and [place] laws. We do not discriminate on the basis of race, color, national origin, age, disability, sex, sexual orientation, or gender identity.            Thank you!     Thank you for choosing [facility]  for your care. Our goal is always to provide you with excellent care. Hearing back from our patients is one way we can continue to improve our services. Please take a few minutes to complete the written survey that you may receive in the mail after your visit with us. Thank you!             Your Updated Medication List - Protect others around you: Learn how to safely use, store and throw away your medicines at www.disposemymeds.org.          This list is accurate as of: 12/15/17  2:06 PM.  Always use your most recent med list.                   Brand Name Dispense Instructions for use Diagnosis    acetaminophen 500 MG tablet    TYLENOL    30 tablet    Take 2 tablets (1,000 mg) by mouth every 6 hours as needed for mild pain    Lower extremity pain, bilateral, " Shakiness       acetaminophen-codeine 300-30 MG per tablet    TYLENOL #3    28 tablet    Take 1-2 tablets by mouth every 4 hours as needed for moderate pain    S/P ICD (internal cardiac defibrillator) procedure       alcohol swab prep pads     100 each    Use to swab area of injection/mary alice as directed 3 x per day        ascorbic acid 500 MG Tabs     2 tablet    Take 1 tablet (500 mg) by mouth daily    Coronary artery disease involving native coronary artery of native heart without angina pectoris       ASPIRIN NOT PRESCRIBED    INTENTIONAL    0 each    Please choose reason not prescribed, below    Cardiogenic shock (H), Type 2 diabetes mellitus without complication, with long-term current use of insulin (H)       atorvastatin 40 MG tablet    LIPITOR    60 tablet    1 tablet (40 mg) by Oral or Feeding Tube route daily    Coronary artery disease involving native coronary artery of native heart without angina pectoris, Cardiogenic shock (H)       baclofen 10 MG tablet    LIORESAL    90 tablet    Take 1 tablet (10 mg) by mouth 3 times daily    Lower limb symptom       beta carotene 93996 UNIT capsule     2 capsule    Take 1 capsule (25,000 Units) by mouth daily    Ischemic cardiomyopathy       bumetanide 0.5 MG tablet    BUMEX    45 tablet    Take 1 tablet (0.5 mg) by mouth every other day    Ischemic cardiomyopathy       cetirizine 10 MG tablet    zyrTEC    60 tablet    Take 1 tablet (10 mg) by mouth daily    Seasonal allergic rhinitis       clopidogrel 75 MG tablet    PLAVIX    60 tablet    Take 1 tablet (75 mg) by mouth daily    ST elevation myocardial infarction (STEMI), unspecified artery (H)       digoxin 125 MCG tablet    LANOXIN    60 tablet    Take 1 tablet (125 mcg) by mouth daily    Coronary artery disease involving native coronary artery of native heart without angina pectoris, Cardiogenic shock (H)       finasteride 5 MG tablet    PROSCAR    60 tablet    Take 1 tablet (5 mg) by mouth daily    Hypertrophy  of prostate with urinary obstruction       insulin glargine 100 UNIT/ML injection    LANTUS    18 mL    Inject 25 Units Subcutaneous every morning (before breakfast)        insulin pen needle 32G X 4 MM    BD KYLEE U/F    100 each    Use 3 daily or as directed.    Diabetes mellitus type 2, insulin dependent (H)       lisinopril 2.5 MG tablet    PRINIVIL/Zestril    30 tablet    Take 1 tablet (2.5 mg) by mouth daily    Ischemic cardiomyopathy       METOPROLOL SUCCINATE ER PO      Take 25 mg by mouth daily        ONETOUCH ULTRA test strip   Generic drug:  blood glucose monitoring     100 strip    USE TO TEST BLOOD SUGAR 3 TIMES DAILY OR AS DIRECTED.    Diabetes mellitus type 2, insulin dependent (H)       oxyCODONE-acetaminophen 5-325 MG per tablet    PERCOCET    28 tablet    Take 1 tablet by mouth every 4 hours as needed for other (mild or moderate pain)    S/P ICD (internal cardiac defibrillator) procedure       pantoprazole 40 MG EC tablet    PROTONIX    60 tablet    Take 1 tablet (40 mg) by mouth daily    Gastrointestinal hemorrhage associated with other gastritis       potassium chloride SA 20 MEQ CR tablet    K-DUR/KLOR-CON M    90 tablet    Take 1 tablet (20 mEq) by mouth daily    Ischemic cardiomyopathy       Sharps Container Misc     1 each    1 each every 30 days    Diabetes mellitus type 2, insulin dependent (H)       tamsulosin 0.4 MG capsule    FLOMAX    60 capsule    Take 1 capsule (0.4 mg) by mouth daily    Ischemic cardiomyopathy       * Warfarin Therapy Reminder      1 each continuous prn    Deep vein thrombosis (DVT) of left lower extremity, unspecified chronicity, unspecified vein (H)       * warfarin 3 MG tablet    COUMADIN    100 tablet    Take one tablet daily as directed by the ACC    Deep vein thrombosis (DVT) of left lower extremity, unspecified chronicity, unspecified vein (H)       zinc sulfate 220 (50 ZN) MG capsule    ZINCATE    2 capsule    Take 1 capsule (220 mg) by mouth daily    Ischemic  cardiomyopathy       * Notice:  This list has 2 medication(s) that are the same as other medications prescribed for you. Read the directions carefully, and ask your doctor or other care provider to review them with you.

## 2017-12-17 ENCOUNTER — PRE VISIT (OUTPATIENT)
Dept: CARDIOLOGY | Facility: CLINIC | Age: 52
End: 2017-12-17

## 2017-12-17 DIAGNOSIS — I50.22 CHRONIC SYSTOLIC HEART FAILURE (H): Primary | ICD-10-CM

## 2017-12-20 ENCOUNTER — OFFICE VISIT (OUTPATIENT)
Dept: CARDIOLOGY | Facility: CLINIC | Age: 52
End: 2017-12-20
Attending: NURSE PRACTITIONER
Payer: COMMERCIAL

## 2017-12-20 VITALS
SYSTOLIC BLOOD PRESSURE: 121 MMHG | WEIGHT: 123.7 LBS | OXYGEN SATURATION: 100 % | HEART RATE: 93 BPM | BODY MASS INDEX: 22.63 KG/M2 | DIASTOLIC BLOOD PRESSURE: 84 MMHG

## 2017-12-20 DIAGNOSIS — I82.722 ARM DVT (DEEP VENOUS THROMBOEMBOLISM), CHRONIC, LEFT (H): ICD-10-CM

## 2017-12-20 DIAGNOSIS — I50.22 CHRONIC SYSTOLIC CONGESTIVE HEART FAILURE (H): Primary | ICD-10-CM

## 2017-12-20 DIAGNOSIS — I50.22 CHRONIC SYSTOLIC HEART FAILURE (H): ICD-10-CM

## 2017-12-20 LAB
ANION GAP SERPL CALCULATED.3IONS-SCNC: 6 MMOL/L (ref 3–14)
BUN SERPL-MCNC: 25 MG/DL (ref 7–30)
CALCIUM SERPL-MCNC: 8.3 MG/DL (ref 8.5–10.1)
CHLORIDE SERPL-SCNC: 107 MMOL/L (ref 94–109)
CO2 SERPL-SCNC: 28 MMOL/L (ref 20–32)
CREAT SERPL-MCNC: 1.26 MG/DL (ref 0.66–1.25)
GFR SERPL CREATININE-BSD FRML MDRD: 60 ML/MIN/1.7M2
GLUCOSE SERPL-MCNC: 108 MG/DL (ref 70–99)
POTASSIUM SERPL-SCNC: 4.2 MMOL/L (ref 3.4–5.3)
SODIUM SERPL-SCNC: 142 MMOL/L (ref 133–144)

## 2017-12-20 PROCEDURE — T1013 SIGN LANG/ORAL INTERPRETER: HCPCS | Mod: U3,ZF

## 2017-12-20 PROCEDURE — 99214 OFFICE O/P EST MOD 30 MIN: CPT | Mod: 24 | Performed by: NURSE PRACTITIONER

## 2017-12-20 PROCEDURE — 80048 BASIC METABOLIC PNL TOTAL CA: CPT | Performed by: NURSE PRACTITIONER

## 2017-12-20 PROCEDURE — 36415 COLL VENOUS BLD VENIPUNCTURE: CPT | Performed by: NURSE PRACTITIONER

## 2017-12-20 PROCEDURE — 99212 OFFICE O/P EST SF 10 MIN: CPT | Mod: ZF

## 2017-12-20 RX ORDER — METOPROLOL SUCCINATE 25 MG/1
37 TABLET, EXTENDED RELEASE ORAL DAILY
Qty: 30 TABLET | COMMUNITY
Start: 2017-12-20 | End: 2018-01-12

## 2017-12-20 ASSESSMENT — PAIN SCALES - GENERAL: PAINLEVEL: NO PAIN (0)

## 2017-12-20 NOTE — MR AVS SNAPSHOT
"              After Visit Summary   2017    Luke Henao    MRN: [id number]           Patient Information     Date Of Birth          1965        Visit Information        Provider Department      2017 7:45 AM Cristobal, Son; Shyanne Montalvo Frances, RUBI [facility]        Today's Diagnoses     Chronic systolic congestive heart failure (H)    -  1      Care Instructions    You were seen today in the Cardiovascular Clinic at the [facility].     Cardiology Providers you saw during your visit: Shyanne Montalvo FNP-C    1. Increase Toprol XL to 37.5 mg by mouth daily    Please make a follow-up CORE/heart failure appt in 3 weeks for medication titration.       Please limit your fluid intake to 2 L (64 ounces) daily.  2 Liters a day = 8.5 cups, or 72 ounces.  Please limit your salt intake to 2 grams a day or less.    If you gain 2# in 24 hours or 5# in one week call Herminia Jiménez RN so we can adjust your medications as needed over the phone.    Please feel free to call me with any questions or concerns.      Questions and [phone].   First press #1 for the [employer] and then press #3 for \"Medical Questions\" to reach us Cardiology Nurses.     On Call Cardiologist for after hours or on weekends: [phone]   option #4 and ask to speak to the on-call Cardiologist. Inform them you are a CORE/heart failure patient at the [facility].        If you need a medication refill please contact your pharmacy.  Please allow 3 business days for your refill to be completed.  _______________________________________________________  C.O.R.E. CLINIC Cardiomyopathy, Optimization, Rehabilitation, Education   The C.O.R.E. CLINIC is a heart failure specialty clinic within the [facility] where you will work with specialized nurse practitioners dedicated to helping patients with heart failure carefully adjust medications, receive education, and learn who and when " to call if symptoms develop. They specialize in helping you better understand your condition, slow the progression of your disease, improve the length and quality of your life, help you detect future heart problems before they become life threatening, and avoid hospitalizations.  As always, thank you for trusting us with your health care needs!            Follow-ups after your visit        Additional Services     Follow-Up with CORE Clinic                 Your next 10 appointments already scheduled     Dec 22, 2017  1:30 PM CST   PHYSICAL with Shanna Church MD   [facility]    [address] [id number] [phone]            Dec 26, 2017 10:00 AM CST   Anticoagulation Visit with [facility]   [facility] ([facility])    [address] [id number] [phone]            Jan 12, 2018  1:00 PM CST   Lab with [facility]   [facility] ([facility])    [address] [place] [id number] [phone]            Jan 12, 2018  1:30 PM CST   (Arrive by 1:15 PM)   CORE RETURN with Vandana Zambrano NP   [facility] ([facility])    [address] [place] [id number] [phone]            Feb 16, 2018 10:00 AM CST   (Arrive by 9:45 AM)   New Patient Visit with Vishal Schaffer MD   [facility] ([facility])    [address] [place] [id number] [phone]            Feb 26, 2018 11:00 AM CST   (Arrive by 10:45 AM)   Implanted Defibulator with Uc Cv Device [address] [place] [address] ([facility])    [address] [place] [id number] [phone]            Feb 26, 2018 11:30 AM CST   (Arrive by 11:15 AM)   RETURN ARRHYTHMIA with Lucila  "RUBI Jacobs CNP   [facility] ([facility])    [address] [facility] [address] [phone]              Future tests that were ordered for you today     Open Future Orders        Priority Expected Expires Ordered    Follow-Up with CORE Clinic Routine 1/10/2018 3/27/2018 2017            Who to contact     If you have questions or need follow up information about today's clinic visit or your schedule please contact [facility] directly at [phone].  Normal or non-critical lab and imaging results will be communicated to you by [employer], letter or phone within 4 business days after the clinic has received the results. If you do not hear from us within 7 days, please contact the clinic through [employer] or phone. If you have a critical or abnormal lab result, we will notify you by phone as soon as possible.  Submit refill requests through [employer] or call your pharmacy and they will forward the refill request to us. Please allow 3 business days for your refill to be completed.          Additional Information About Your Visit        MyChart Information     [employer] lets you send messages to your doctor, view your test results, renew your prescriptions, schedule appointments and more. To sign up, go to [email or web address] . Click on \"Log in\" on the left side of the screen, which will take you to the Welcome page. Then click on \"Sign up Now\" on the right side of the page.     You will be asked to enter the access code listed below, as well as some personal information. Please follow the directions to create your username and password.     Your access code is: [email or web address]  Expires: 2017  5:30 AM     Your access code will  in 90 days. If you need help or a new code, please call your [place] clinic or [phone].        Care EveryWhere ID     This is your Care EveryWhere ID. This could be used by other organizations to " access your [facility] medical records  [id number]        Your Vitals Were     Pulse Pulse Oximetry BMI (Body Mass Index)             93 100% 22.63 kg/m2          Blood Pressure from Last 3 Encounters:   12/20/17 121/84   12/15/17 115/79   11/30/17 100/74    Weight from Last 3 Encounters:   12/20/17 56.1 kg (123 lb 11.2 oz)   12/15/17 55.6 kg (122 lb 9.6 oz)   11/30/17 56.2 kg (123 lb 14.4 oz)                 Today's Medication Changes          These changes are accurate as of: 12/20/17  8:31 AM.  If you have any questions, ask your nurse or doctor.               These medicines have changed or have updated prescriptions.        Dose/Directions    metoprolol 25 MG 24 hr tablet   Commonly known as:  TOPROL-XL   This may have changed:    - medication strength  - how much to take   Changed by:  Shyanne Montavlo APRN CNP        Dose:  37 mg   Take 1.5 tablets (37.5 mg) by mouth daily   Quantity:  30 tablet   Refills:  0                Primary Care Provider Office Phone # Fax #    Shanna Church [phone]       [address] [place] [address]        Equal Access to Services     CUBA AYALA AH: Hadruby powerso Soericka, waaxda luqadaha, qaybta kaalmada adeegyada, karen rosen. So [facility] [phone].    ATENCIÓN: Si habla español, tiene a suarez disposición servicios gratGila Regional Medical Centeros de asistencia lingüística. Llame al [phone].    We comply with applicable federal civil rights laws and [place] laws. We do not discriminate on the basis of race, color, national origin, age, disability, sex, sexual orientation, or gender identity.            Thank you!     Thank you for choosing [facility]  for your care. Our goal is always to provide you with excellent care. Hearing back from our patients is one way we can continue to improve our services. Please take a few minutes to complete the written survey that you may receive in the mail after your visit with us. Thank  you!             Your Updated Medication List - Protect others around you: Learn how to safely use, store and throw away your medicines at www.disposemymeds.org.          This list is accurate as of: 12/20/17  8:31 AM.  Always use your most recent med list.                   Brand Name Dispense Instructions for use Diagnosis    acetaminophen 500 MG tablet    TYLENOL    30 tablet    Take 2 tablets (1,000 mg) by mouth every 6 hours as needed for mild pain    Lower extremity pain, bilateral, Shakiness       acetaminophen-codeine 300-30 MG per tablet    TYLENOL #3    28 tablet    Take 1-2 tablets by mouth every 4 hours as needed for moderate pain    S/P ICD (internal cardiac defibrillator) procedure       alcohol swab prep pads     100 each    Use to swab area of injection/mary alice as directed 3 x per day        ascorbic acid 500 MG Tabs     2 tablet    Take 1 tablet (500 mg) by mouth daily    Coronary artery disease involving native coronary artery of native heart without angina pectoris       ASPIRIN NOT PRESCRIBED    INTENTIONAL    0 each    Please choose reason not prescribed, below    Cardiogenic shock (H), Type 2 diabetes mellitus without complication, with long-term current use of insulin (H)       atorvastatin 40 MG tablet    LIPITOR    60 tablet    1 tablet (40 mg) by Oral or Feeding Tube route daily    Coronary artery disease involving native coronary artery of native heart without angina pectoris, Cardiogenic shock (H)       baclofen 10 MG tablet    LIORESAL    90 tablet    Take 1 tablet (10 mg) by mouth 3 times daily    Lower limb symptom       beta carotene 70333 UNIT capsule     2 capsule    Take 1 capsule (25,000 Units) by mouth daily    Ischemic cardiomyopathy       bumetanide 0.5 MG tablet    BUMEX    45 tablet    Take 1 tablet (0.5 mg) by mouth every other day    Ischemic cardiomyopathy       cetirizine 10 MG tablet    zyrTEC    60 tablet    Take 1 tablet (10 mg) by mouth daily    Seasonal allergic  rhinitis       clopidogrel 75 MG tablet    PLAVIX    60 tablet    Take 1 tablet (75 mg) by mouth daily    ST elevation myocardial infarction (STEMI), unspecified artery (H)       digoxin 125 MCG tablet    LANOXIN    60 tablet    Take 1 tablet (125 mcg) by mouth daily    Coronary artery disease involving native coronary artery of native heart without angina pectoris, Cardiogenic shock (H)       finasteride 5 MG tablet    PROSCAR    60 tablet    Take 1 tablet (5 mg) by mouth daily    Hypertrophy of prostate with urinary obstruction       insulin glargine 100 UNIT/ML injection    LANTUS    18 mL    Inject 25 Units Subcutaneous every morning (before breakfast)        insulin pen needle 32G X 4 MM    BD KYLEE U/F    100 each    Use 3 daily or as directed.    Diabetes mellitus type 2, insulin dependent (H)       lisinopril 2.5 MG tablet    PRINIVIL/Zestril    30 tablet    Take 1 tablet (2.5 mg) by mouth daily    Ischemic cardiomyopathy       metoprolol 25 MG 24 hr tablet    TOPROL-XL    30 tablet    Take 1.5 tablets (37.5 mg) by mouth daily        ONETOUCH ULTRA test strip   Generic drug:  blood glucose monitoring     100 strip    USE TO TEST BLOOD SUGAR 3 TIMES DAILY OR AS DIRECTED.    Diabetes mellitus type 2, insulin dependent (H)       oxyCODONE-acetaminophen 5-325 MG per tablet    PERCOCET    28 tablet    Take 1 tablet by mouth every 4 hours as needed for other (mild or moderate pain)    S/P ICD (internal cardiac defibrillator) procedure       pantoprazole 40 MG EC tablet    PROTONIX    60 tablet    Take 1 tablet (40 mg) by mouth daily    Gastrointestinal hemorrhage associated with other gastritis       potassium chloride SA 20 MEQ CR tablet    K-DUR/KLOR-CON M    90 tablet    Take 1 tablet (20 mEq) by mouth daily    Ischemic cardiomyopathy       Sharps Container Misc     1 each    1 each every 30 days    Diabetes mellitus type 2, insulin dependent (H)       tamsulosin 0.4 MG capsule    FLOMAX    60 capsule    Take 1  capsule (0.4 mg) by mouth daily    Ischemic cardiomyopathy       * Warfarin Therapy Reminder      1 each continuous prn    Deep vein thrombosis (DVT) of left lower extremity, unspecified chronicity, unspecified vein (H)       * warfarin 3 MG tablet    COUMADIN    100 tablet    Take one tablet daily as directed by the [facility]    Deep vein thrombosis (DVT) of left lower extremity, unspecified chronicity, unspecified vein (H)       zinc sulfate 220 (50 ZN) MG capsule    ZINCATE    2 capsule    Take 1 capsule (220 mg) by mouth daily    Ischemic cardiomyopathy       * Notice:  This list has 2 medication(s) that are the same as other medications prescribed for you. Read the directions carefully, and ask your doctor or other care provider to review them with you.

## 2017-12-20 NOTE — PROGRESS NOTES
HPI:   Mr. 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 He presents to [facility] clinic for routine follow up.     He is able to ambulate approximately 15-20 minutes prior to needing rest, which he feels is more limited by LE weakness and cramping. He is currently working in the hearing aide business. He denies fever, chills, lightheadedness, dizziness, chest pain, palpitations, SOB, RAYMOND, PND, orthopnea, nausea, vomiting, diarrhea, hematochezia, melena, or LE edema. His weight remains stable at 122 lbs. He continues to follow a low sodium diet.      PAST MEDICAL HISTORY:  Past Medical History:   Diagnosis Date     CAD (coronary artery disease) 2017    RCA STEMI s/p balloon angioplasty and multiple Sofie to RCA, LCx, and LAD     DVT (deep venous thrombosis) (H) 2017    left internal jugular (line-associated); left common femoral; on coumadin     Ischemic cardiomyopathy 2017    EF 30-35%     Ischemic stroke (H) 2017    no residual deficits     Lower GI bleed 2017    due to rectal ulcer     Mitral valve insufficiency, ischemic 2017    s/p Mitral Clip placement      Type 2 diabetes mellitus (H)        FAMILY HISTORY:  Family History   Problem Relation Age of Onset     Hypertension Mother      Type 2 Diabetes Father      Hypertension Father      Myocardial Infarction No family hx of      CEREBROVASCULAR DISEASE No family hx of      Coronary Artery Disease Early Onset No family hx of      Colon Cancer No family hx of      Prostate Cancer No family hx of        SOCIAL HISTORY:  Social History     Social History     Marital status:      Spouse name: N/A     Number of children: N/A     Years of education: N/A     Occupational History      Hearing Aid [employer]      Social History Main Topics     Smoking status: Former Smoker     Packs/day: 0.50     Years: 30.00     Types: Cigarettes     Quit date: 1/1/2017     Smokeless tobacco: Never Used     Alcohol use No     Drug use: No     Sexual activity: Not Currently     Other Topics Concern     None     Social History Narrative    . Remarried.    4 children with first marriage.    2 grand children.    Walks daily, but no formal exercise.            CURRENT MEDICATIONS:  Outpatient Medications Prior to Visit   Medication Sig Dispense Refill     potassium chloride SA (K-DUR/KLOR-CON M) 20 MEQ CR tablet Take 1 tablet (20 mEq) by mouth daily 90 tablet 3     bumetanide (BUMEX) 0.5 MG tablet Take 1 tablet (0.5 mg) by mouth every other day 45 tablet 3     lisinopril (PRINIVIL/ZESTRIL) 2.5 MG tablet Take 1 tablet (2.5 mg) by mouth daily 30 tablet 1     ONETOUCH ULTRA test strip USE TO TEST BLOOD SUGAR 3 TIMES DAILY OR AS DIRECTED. 100 strip 3     METOPROLOL SUCCINATE ER PO Take 25 mg by mouth daily       warfarin (COUMADIN) 3 MG tablet Take one tablet daily as directed by the  tablet 0     digoxin (LANOXIN) 125 MCG tablet Take 1 tablet (125 mcg) by mouth daily 60 tablet 5     insulin pen needle (BD KYLEE U/F) 32G X 4 MM Use 3 daily or as directed. 100 each prn     alcohol swab prep pads Use to swab area of injection/mary alice as directed 3 x per day 100 each 11     insulin glargine (LANTUS) 100 UNIT/ML injection Inject 25 Units Subcutaneous every morning (before breakfast) 18 mL 3     atorvastatin (LIPITOR) 40 MG tablet 1 tablet (40 mg) by Oral or Feeding Tube route daily 60 tablet 7     cetirizine (ZYRTEC) 10 MG tablet Take 1 tablet (10 mg) by mouth daily 60 tablet 11     clopidogrel (PLAVIX) 75 MG tablet Take 1 tablet (75 mg) by mouth daily 60 tablet 11     finasteride (PROSCAR) 5 MG tablet Take 1 tablet (5 mg) by mouth daily 60 tablet 11     pantoprazole (PROTONIX) 40 MG EC tablet Take 1 tablet (40 mg)  by mouth daily 60 tablet 11     zinc sulfate (ZINCATE) 220 (50 ZN) MG capsule Take 1 capsule (220 mg) by mouth daily 2 capsule 0     Sharps Container MISC 1 each every 30 days 1 each 0     Warfarin Therapy Reminder 1 each continuous prn       acetaminophen-codeine (TYLENOL #3) 300-30 MG per tablet Take 1-2 tablets by mouth every 4 hours as needed for moderate pain (Patient not taking: Reported on 12/15/2017) 28 tablet 0     oxyCODONE-acetaminophen (PERCOCET) 5-325 MG per tablet Take 1 tablet by mouth every 4 hours as needed for other (mild or moderate pain) (Patient not taking: Reported on 12/15/2017) 28 tablet 0     baclofen (LIORESAL) 10 MG tablet Take 1 tablet (10 mg) by mouth 3 times daily (Patient not taking: Reported on 11/30/2017) 90 tablet 3     acetaminophen (TYLENOL) 500 MG tablet Take 2 tablets (1,000 mg) by mouth every 6 hours as needed for mild pain (Patient not taking: Reported on 12/15/2017) 30 tablet 0     ASPIRIN NOT PRESCRIBED (INTENTIONAL) Please choose reason not prescribed, below (Patient not taking: Reported on 11/30/2017) 0 each 0     ascorbic acid 500 MG TABS Take 1 tablet (500 mg) by mouth daily (Patient not taking: Reported on 12/15/2017) 2 tablet 0     beta carotene 49225 UNIT capsule Take 1 capsule (25,000 Units) by mouth daily (Patient not taking: Reported on 12/15/2017) 2 capsule 0     tamsulosin (FLOMAX) 0.4 MG capsule Take 1 capsule (0.4 mg) by mouth daily (Patient not taking: Reported on 12/15/2017) 60 capsule 0     No facility-administered medications prior to visit.        ROS:   CONSTITUTIONAL: Denies fever, chills, fatigue, or weight fluctuations.   HEENT: Denies headache, vision changes, and changes in speech.   CV: Refer to HPI.   PULMONARY:Denies shortness of breath, cough, or previous TB exposure.   GI:Denies nausea, vomiting, diarrhea, and abdominal pain. Bowel movements are regular.   :Denies urinary alterations, dysuria, urinary frequency, hematuria, and abnormal  drainage.   EXT:Denies lower extremity edema.   SKIN:Denies abnormal rashes or lesions.   MUSCULOSKELETAL:Denies upper or lower extremity weakness and pain.   NEUROLOGIC:Denies lightheadedness, dizziness, seizures, or upper or lower extremity paresthesia.     EXAM:  /84 (BP Location: Left arm, Patient Position: Chair, Cuff Size: Adult Regular)  Pulse 93  Wt 56.1 kg (123 lb 11.2 oz)  SpO2 100%  BMI 22.63 kg/m2  GENERAL: Appears alert and oriented times three.   HEENT: Eye symmetrical and free of discharge bilaterally. Mucous membranes moist and without lesions.  NECK: Supple and without lymphadenopathy. JVD below clavicular line upright.    CV: RRR, S1S2 present without murmur, rub, or gallop.   RESPIRATORY: Respirations regular, even, and unlabored. Lungs CTA throughout.   GI: Soft and non distended with normoactive bowel sounds present in all quadrants. No tenderness, rebound, guarding. No organomegaly.   EXTREMITIES: No peripheral edema. 2+ bilateral pedal pulses.   NEUROLOGIC: Alert and orientated x 3. CN II-XII grossly intact. No focal deficits.   MUSCULOSKELETAL: No joint swelling or tenderness.   SKIN: No jaundice. No rashes or lesions.     Labs:  CBC RESULTS:  Lab Results   Component Value Date    WBC 11.2 (H) 10/27/2017    RBC 4.48 10/27/2017    HGB 13.5 10/27/2017    HCT 42.2 10/27/2017    MCV 94 10/27/2017    MCH 30.1 10/27/2017    MCHC 32.0 10/27/2017    RDW 13.0 10/27/2017     10/27/2017       CMP RESULTS:  Lab Results   Component Value Date     12/20/2017    POTASSIUM 4.2 12/20/2017    CHLORIDE 107 12/20/2017    CO2 28 12/20/2017    ANIONGAP 6 12/20/2017     (H) 12/20/2017    BUN 25 12/20/2017    CR 1.26 (H) 12/20/2017    GFRESTIMATED 60 (L) 12/20/2017    GFRESTBLACK 73 12/20/2017    ROB 8.3 (L) 12/20/2017    BILITOTAL 0.6 09/26/2017    ALBUMIN 4.1 09/26/2017    ALKPHOS 63 09/26/2017    ALT 19 09/26/2017    AST 15 09/26/2017        INR RESULTS:  Lab Results   Component  Value Date    INR 2.5 (A) 2017    INR 2.36 (H) 2017       Lab Results   Component Value Date    MAG 2.4 (H) 2017     Lab Results   Component Value Date    NTBNPI 9984 (H) 2017     Lab Results   Component Value Date    NTBNP 1599 (H) 2017       Diagnostics:  Recent Results (from the past 4320 hour(s))   Echocardiogram Limited    Narrative    [id number]  Critical access hospital  [id number]  [id number]^MEGHAN^IVA^AMBER           [facility]  Diagnostic and Treamtent-3rd Floor  [address] [place] [address]     Name: SYDNIE SIERRA  MRN: [id number]  : 1965  Study Date: 12/15/2017 10:52 AM  Age: 52 yrs  Gender: Male  Patient Location: [facility]  Reason For Study: s/p Karena Clip  Ordering Physician: IVA CHRISTIANSON  Referring Physician: IVA CHRISTIANSON  Performed By: Sandra Fields RDCS     BSA: 1.6 m2  Height: 62 in  Weight: 123 lb  BP: 100/74 mmHg  _____________________________________________________________________________  __        Procedure  Limited Echocardiogram with portions of two-dimensional, color and spectral  Doppler performed.  _____________________________________________________________________________  __        Interpretation Summary  Moderately reduced systolic function. EF 30-35%. Global hypokinesis with  akinesis of the basal-mid inferior and basal-mid inferolateral walls.  Global right ventricular function is mildly reduced.  Status post transcatheter mitral valve repair with Mitraclip. The clip is  attached to the leaflets. There is trace to mild mitral regurgitation. Mean  gradient 5 mmHg at heart rate 97 bpm.  Pulmonary hypertension with right ventricular systolic pressure 41mmHg above  the right atrial pressure present.  No pericardial effusion.     Compared to the prior study 8/10/17 there has been no significant change.  _____________________________________________________________________________  __        Left  Ventricle  Left ventricular wall thickness is normal. Mild left ventricular dilation is  present. Global hypokinesis with akinesis of the basal-mid inferior and basal-  mid inferolateral walls. Moderately reduced systolic function. EF 30-35%.  Diastolic function not assessed.     Right Ventricle  The right ventricle is normal size. Global right ventricular function is  mildly reduced. A pacemaker lead is noted in the right ventricle.     Atria  Both atria appear normal.     Mitral Valve  Status post transcatheter mitral valve repair with Mitraclip. The clip is  attached to the leaflets. There is trace to mild mitral regurgitation. Mean  gradient 5 mmHg at heart rate 97 bpm.        Aortic Valve  Aortic valve is normal in structure and function.     Tricuspid Valve  Mild tricuspid insufficiency is present. Right ventricular systolic pressure  is 41mmHg above the right atrial pressure.     Pulmonic Valve  The pulmonic valve is normal.     Vessels  The aorta root is normal. The inferior vena cava was normal in size with  preserved respiratory variability. Estimated mean right atrial pressure is 3  mmHg.     Pericardium  No pericardial effusion is present.        Attestation  I have personally viewed the imaging and agree with the interpretation and  report as documented by the fellow, David Hill, and/or edited by me.  _____________________________________________________________________________  __  MMode/2D Measurements & Calculations     IVSd: 0.72 cm  LVIDd: 4.9 cm  LVIDs: 4.2 cm  LVPWd: 0.73 cm  FS: 14.2 %  EDV(Teich): 115.5 ml  ESV(Teich): 80.6 ml  LV mass(C)d: 118.2 grams  LV mass(C)dI: 76.1 grams/m2     EF(MOD-bp): 34.1 %  LA Volume (BP): 49.5 ml  LA Volume Index (BP): 31.9 ml/m2  RWT: 0.30           Doppler Measurements & Calculations  MV E max isabel: 126.4 cm/sec  MV A max isabel: 46.5 cm/sec  MV E/A: 2.7  MV max P.7 mmHg  MV mean P.6 mmHg  MV V2 VTI: 26.5 cm  MV dec time: 0.13 sec  TR max isabel: 321.2  cm/sec  TR max P.3 mmHg     _____________________________________________________________________________  __           Report approved by: Isabel Alex 12/15/2017 12:24 PM      ECHO COMPLETE WITH OPTISON    Narrative    [id number]  [facility]  [id number]  [id number]^MEGHAN^IVA^AMBER           [facility]  Diagnostic and Treamtent-3rd Floor  [address] [place] [address]     Name: SYDNIE SIERRA  MRN: [id number]  : 1965  Study Date: 08/10/2017 09:02 AM  Age: 52 yrs  Gender: Male  Patient [place] [facility]  Reason For Study: Nonrheumatic mitral (valve) insufficiency  Ordering Physician: IVA CHRISTIANSON  Referring Physician: IVA CHRISTIANSON  Performed By: True Newman RDCS     BSA: 1.5 m2  Height: 62 in  Weight: 118 lb  _____________________________________________________________________________  __        Procedure  Echocardiogram with two-dimensional, color and spectral Doppler performed.  _____________________________________________________________________________  __        Interpretation Summary  Moderately (EF 30-35%) reduced left ventricular function is present (bi-plane  32%). Basal lateral, inferior, and inferoseptal hypokinesis.  Global RV function mildly reduced.  S/p trans-catheter MVR 2017. Mild mitral insufficiency is present. Mean  valve gradient 5 mmHg at a heart rate of 104 bpm.  Right ventricular systolic pressure is 40mmHg above the right atrial pressure.  The inferior vena cava is normal in size with preserved respiratory  variability.  No pericardial effusion is present.  _____________________________________________________________________________  __        Left Ventricle  Left ventricular wall thickness is normal. Left ventricular size is normal.  Diastolic function cannot be assessedBiplane traced at 32%. Moderately (EF 35-  40%) reduced left ventricular function is present. Basal lateral and inferior,  basal and mid  inferoseptal, basal inferior hypokinesis.     Right Ventricle  The right ventricle is normal size. Global right ventricular function is  mildly reduced.     Atria  The right atria appears normal. Mild left atrial enlargement is present. The  atrial septum is intact as assessed by color Doppler .        Mitral Valve  S/p mitral valve repair. Mean gradient across the valve is 5 mmHg. Mild mitral  annular calcification is present. Mild mitral insufficiency is present. The  mean mitral valve gradient is 4.6 mmHg.     Aortic Valve  The aortic valve is tricuspid. Trace aortic insufficiency is present.     Tricuspid Valve  Mild tricuspid insufficiency is present. Right ventricular systolic pressure  is 40mmHg above the right atrial pressure.     Pulmonic Valve  The pulmonic valve is normal. Trace pulmonic insufficiency is present.     Vessels  The aorta root is normal. The inferior vena cava was normal in size with  preserved respiratory variability. Estimated mean right atrial pressure is 3  mmHg.     Pericardium  No pericardial effusion is present.        Compared to Previous Study  This study was compared with the study from 2017 . The RVSP is marginally  higher.     Attestation  I have personally viewed the imaging and agree with the interpretation and  report as documented by the fellow, Dmitriy Merino, and/or edited by me.  _____________________________________________________________________________  __     MMode/2D Measurements & Calculations  IVSd: 0.73 cm  LVIDd: 5.3 cm  LVIDs: 4.5 cm  LVPWd: 0.46 cm  FS: 14.0 %  EDV(Teich): 134.6 ml  ESV(Teich): 94.8 ml  LV mass(C)d: 103.8 grams  LV mass(C)dI: 67.9 grams/m2  Ao root diam: 2.6 cm  asc Aorta Diam: 3.1 cm  LVOT diam: 1.8 cm  LVOT area: 2.6 cm2        Doppler Measurements & Calculations  MV max P.3 mmHg  MV mean P.6 mmHg  MV V2 VTI: 23.7 cm  TR max isabel: 317.6 cm/sec  TR max P.4 mmHg         _____________________________________________________________________________  __        Report approved by: Isabel Daniel 08/10/2017 03:02 PM          Assessment and Plan:   Mr. 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 He presents to [facility] clinic for routine follow up.     Chronic systolic heart failure secondary to ICM.    Stage C, NYHA Class II  ACEi/ARB Lisinopril 2.5 mg po daily, prior reduction due to renal function.   BB Increase Toprol XL to 37.5 mg po daily   Aldosterone antagonist contraindicated due to transient renal function   SCD prophylaxis ICD.   Fluid status Euvolemic.     CAD. S/p POBA RCA, FEIM to RCA times 7, LCx, and LAD 3/27/17 with refractory cardiogenic shock s/p IABP transitioned to subclavian balloon pump inpatient.   - ASA, Statin, BB, and Plavix.     Left IJ DVT. Provoked event, present on US 4/29/17, but nonocclusive. Initial diagnosis 4/23/17.  - Coumadin indicated for 3 months following provoked event. Currently on triple therapy with ASA, Plavix, and Coumadin.   - Given watershed ischemic CVA obtain US prior to discontinuation.     CVA. Bilateral hemispheric infarcts secondary to watershed ischemia.   - Currently on triple therapy as above.   - Reviewed with Neurology for need for long term anticoagulation from their perspective, not indicated.     MR s/p Mitral clip. Implanted 5/1/17. Secondary to Ischemic MR.  - Echo 12/15/17 consistent with mitral clip in place with mild MR with mean gradient 5 mmHg.     Follow up in CORE clinic in 3 weeks.       Shyanne Montalvo  12/20/2017          IVA MARTINEZ

## 2017-12-20 NOTE — NURSING NOTE
Chief Complaint   Patient presents with     Follow Up For     Return CORE; 52yr old male with a h/o chronic systolic HF secondary to ICM with reduced EF 32%. S/P mitralclip presenting for follow-up with labs prior     Vitals were taken and medications were reconciled.     Delaney Soria,KATYA  7:51 AM

## 2017-12-20 NOTE — PATIENT INSTRUCTIONS
"You were seen today in the Cardiovascular Clinic at the [facility].     Cardiology Providers you saw during your visit: Shyanne BURK    1. Increase Toprol XL to 37.5 mg by mouth daily    Please make a follow-up CORE/heart failure appt in 3 weeks for medication titration.       Please limit your fluid intake to 2 L (64 ounces) daily.  2 Liters a day = 8.5 cups, or 72 ounces.  Please limit your salt intake to 2 grams a day or less.    If you gain 2# in 24 hours or 5# in one week call Herminia Jiménez RN so we can adjust your medications as needed over the phone.    Please feel free to call me with any questions or concerns.      Questions and [phone].   First press #1 for the [employer] and then press #3 for \"Medical Questions\" to reach us Cardiology Nurses.     On Call Cardiologist for after hours or on weekends: [phone]   option #4 and ask to speak to the on-call Cardiologist. Inform them you are a CORE/heart failure patient at the [facility].        If you need a medication refill please contact your pharmacy.  Please allow 3 business days for your refill to be completed.  _______________________________________________________  [facility]IN[facility] Cardiomyopathy, Optimization, Rehabilitation, Education   The [facility].R[facility] CLINIC is a heart failure specialty clinic within the [facility] where you will work with specialized nurse practitioners dedicated to helping patients with heart failure carefully adjust medications, receive education, and learn who and when to call if symptoms develop. They specialize in helping you better understand your condition, slow the progression of your disease, improve the length and quality of your life, help you detect future heart problems before they become life threatening, and avoid hospitalizations.  As always, thank you for trusting us with your health care needs!    "

## 2017-12-20 NOTE — Clinical Note
Please contact him to schedule UE ultrasound to rule out prior DVT. If DVT is not present no further anticoagulation is indicated given prior DVT 4/17 with clearance per Neurology.   Thanks,   Shyanne PRESTON-C

## 2017-12-20 NOTE — LETTER
12/20/2017      RE: Luke Henao  [address]  [facility] [address]       Dear Colleague,    Thank you for the opportunity to participate in the care of your patient, Luke Henao, at the [facility] Please see a copy of my visit note below.    HPI:   Mr. 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 He presents to [facility] for routine follow up.     He is able to ambulate approximately 15-20 minutes prior to needing rest, which he feels is more limited by LE weakness and cramping. He is currently working in the hearing aide business. He denies fever, chills, lightheadedness, dizziness, chest pain, palpitations, SOB, RAYMOND, PND, orthopnea, nausea, vomiting, diarrhea, hematochezia, melena, or LE edema. His weight remains stable at 122 lbs. He continues to follow a low sodium diet.      PAST MEDICAL HISTORY:  Past Medical History:   Diagnosis Date     CAD (coronary artery disease) 2017    RCA STEMI s/p balloon angioplasty and multiple Sofie to RCA, LCx, and LAD     DVT (deep venous thrombosis) (H) 2017    left internal jugular (line-associated); left common femoral; on coumadin     Ischemic cardiomyopathy 2017    EF 30-35%     Ischemic stroke (H) 2017    no residual deficits     Lower GI bleed 2017    due to rectal ulcer     Mitral valve insufficiency, ischemic 2017    s/p Mitral Clip placement      Type 2 diabetes mellitus (H)        FAMILY HISTORY:  Family History   Problem Relation Age of Onset     Hypertension Mother      Type 2 Diabetes Father      Hypertension Father      Myocardial Infarction No family hx of      CEREBROVASCULAR DISEASE No family hx of       Coronary Artery Disease Early Onset No family hx of      Colon Cancer No family hx of      Prostate Cancer No family hx of        SOCIAL HISTORY:  Social History     Social History     Marital status:      Spouse name: N/A     Number of children: N/A     Years of education: N/A     Occupational History     Hearing Aid [employer]      Social History Main Topics     Smoking status: Former Smoker     Packs/day: 0.50     Years: 30.00     Types: Cigarettes     Quit date: 1/1/2017     Smokeless tobacco: Never Used     Alcohol use No     Drug use: No     Sexual activity: Not Currently     Other Topics Concern     None     Social History Narrative    . Remarried.    4 children with first marriage.    2 grand children.    Walks daily, but no formal exercise.            CURRENT MEDICATIONS:  Outpatient Medications Prior to Visit   Medication Sig Dispense Refill     potassium chloride SA (K-DUR/KLOR-CON M) 20 MEQ CR tablet Take 1 tablet (20 mEq) by mouth daily 90 tablet 3     bumetanide (BUMEX) 0.5 MG tablet Take 1 tablet (0.5 mg) by mouth every other day 45 tablet 3     lisinopril (PRINIVIL/ZESTRIL) 2.5 MG tablet Take 1 tablet (2.5 mg) by mouth daily 30 tablet 1     ONETOUCH ULTRA test strip USE TO TEST BLOOD SUGAR 3 TIMES DAILY OR AS DIRECTED. 100 strip 3     METOPROLOL SUCCINATE ER PO Take 25 mg by mouth daily       warfarin (COUMADIN) 3 MG tablet Take one tablet daily as directed by the  tablet 0     digoxin (LANOXIN) 125 MCG tablet Take 1 tablet (125 mcg) by mouth daily 60 tablet 5     insulin pen needle (BD KYLEE U/F) 32G X 4 MM Use 3 daily or as directed. 100 each prn     alcohol swab prep pads Use to swab area of injection/mary alice as directed 3 x per day 100 each 11     insulin glargine (LANTUS) 100 UNIT/ML injection Inject 25 Units Subcutaneous every morning (before breakfast) 18 mL 3     atorvastatin (LIPITOR) 40 MG tablet 1 tablet (40 mg) by Oral or Feeding Tube route daily 60 tablet 7      cetirizine (ZYRTEC) 10 MG tablet Take 1 tablet (10 mg) by mouth daily 60 tablet 11     clopidogrel (PLAVIX) 75 MG tablet Take 1 tablet (75 mg) by mouth daily 60 tablet 11     finasteride (PROSCAR) 5 MG tablet Take 1 tablet (5 mg) by mouth daily 60 tablet 11     pantoprazole (PROTONIX) 40 MG EC tablet Take 1 tablet (40 mg) by mouth daily 60 tablet 11     zinc sulfate (ZINCATE) 220 (50 ZN) MG capsule Take 1 capsule (220 mg) by mouth daily 2 capsule 0     Sharps Container MISC 1 each every 30 days 1 each 0     Warfarin Therapy Reminder 1 each continuous prn       acetaminophen-codeine (TYLENOL #3) 300-30 MG per tablet Take 1-2 tablets by mouth every 4 hours as needed for moderate pain (Patient not taking: Reported on 12/15/2017) 28 tablet 0     oxyCODONE-acetaminophen (PERCOCET) 5-325 MG per tablet Take 1 tablet by mouth every 4 hours as needed for other (mild or moderate pain) (Patient not taking: Reported on 12/15/2017) 28 tablet 0     baclofen (LIORESAL) 10 MG tablet Take 1 tablet (10 mg) by mouth 3 times daily (Patient not taking: Reported on 11/30/2017) 90 tablet 3     acetaminophen (TYLENOL) 500 MG tablet Take 2 tablets (1,000 mg) by mouth every 6 hours as needed for mild pain (Patient not taking: Reported on 12/15/2017) 30 tablet 0     ASPIRIN NOT PRESCRIBED (INTENTIONAL) Please choose reason not prescribed, below (Patient not taking: Reported on 11/30/2017) 0 each 0     ascorbic acid 500 MG TABS Take 1 tablet (500 mg) by mouth daily (Patient not taking: Reported on 12/15/2017) 2 tablet 0     beta carotene 35808 UNIT capsule Take 1 capsule (25,000 Units) by mouth daily (Patient not taking: Reported on 12/15/2017) 2 capsule 0     tamsulosin (FLOMAX) 0.4 MG capsule Take 1 capsule (0.4 mg) by mouth daily (Patient not taking: Reported on 12/15/2017) 60 capsule 0     No facility-administered medications prior to visit.        ROS:   CONSTITUTIONAL: Denies fever, chills, fatigue, or weight fluctuations.   HEENT:  Denies headache, vision changes, and changes in speech.   CV: Refer to HPI.   PULMONARY:Denies shortness of breath, cough, or previous TB exposure.   GI:Denies nausea, vomiting, diarrhea, and abdominal pain. Bowel movements are regular.   :Denies urinary alterations, dysuria, urinary frequency, hematuria, and abnormal drainage.   EXT:Denies lower extremity edema.   SKIN:Denies abnormal rashes or lesions.   MUSCULOSKELETAL:Denies upper or lower extremity weakness and pain.   NEUROLOGIC:Denies lightheadedness, dizziness, seizures, or upper or lower extremity paresthesia.     EXAM:  /84 (BP Location: Left arm, Patient Position: Chair, Cuff Size: Adult Regular)  Pulse 93  Wt 56.1 kg (123 lb 11.2 oz)  SpO2 100%  BMI 22.63 kg/m2  GENERAL: Appears alert and oriented times three.   HEENT: Eye symmetrical and free of discharge bilaterally. Mucous membranes moist and without lesions.  NECK: Supple and without lymphadenopathy. JVD below clavicular line upright.    CV: RRR, S1S2 present without murmur, rub, or gallop.   RESPIRATORY: Respirations regular, even, and unlabored. Lungs CTA throughout.   GI: Soft and non distended with normoactive bowel sounds present in all quadrants. No tenderness, rebound, guarding. No organomegaly.   EXTREMITIES: No peripheral edema. 2+ bilateral pedal pulses.   NEUROLOGIC: Alert and orientated x 3. CN II-XII grossly intact. No focal deficits.   MUSCULOSKELETAL: No joint swelling or tenderness.   SKIN: No jaundice. No rashes or lesions.     Labs:  CBC RESULTS:  Lab Results   Component Value Date    WBC 11.2 (H) 10/27/2017    RBC 4.48 10/27/2017    HGB 13.5 10/27/2017    HCT 42.2 10/27/2017    MCV 94 10/27/2017    MCH 30.1 10/27/2017    MCHC 32.0 10/27/2017    RDW 13.0 10/27/2017     10/27/2017       CMP RESULTS:  Lab Results   Component Value Date     12/20/2017    POTASSIUM 4.2 12/20/2017    CHLORIDE 107 12/20/2017    CO2 28 12/20/2017    ANIONGAP 6 12/20/2017      (H) 2017    BUN 25 2017    CR 1.26 (H) 2017    GFRESTIMATED 60 (L) 2017    GFRESTBLACK 73 2017    ROB 8.3 (L) 2017    BILITOTAL 0.6 2017    ALBUMIN 4.1 2017    ALKPHOS 63 2017    ALT 19 2017    AST 15 2017        INR RESULTS:  Lab Results   Component Value Date    INR 2.5 (A) 2017    INR 2.36 (H) 2017       Lab Results   Component Value Date    MAG 2.4 (H) 2017     Lab Results   Component Value Date    NTBNPI 9984 (H) 2017     Lab Results   Component Value Date    NTBNP 1599 (H) 2017       Diagnostics:  Recent Results (from the past 4320 hour(s))   Echocardiogram Limited    Narrative    [id number]  ECH  [id number]  [id number]^MEGHAN^IVA^AMBER           [facility]  Diagnostic and [address] [address]     Name: SYDNIE SIERRA  MRN: [id number]  : 1965  Study Date: 12/15/2017 10:52 AM  Age: 52 yrs  Gender: Male  Patient Location: [facility]  Reason For Study: s/p Karena Clip  Ordering Physician: IVA CHRISTIANSON  Referring Physician: IVA CHRISTIANSON  Performed By: Sandra Fields RDCS     BSA: 1.6 m2  Height: 62 in  Weight: 123 lb  BP: 100/74 mmHg  _____________________________________________________________________________  __        Procedure  Limited Echocardiogram with portions of two-dimensional, color and spectral  Doppler performed.  _____________________________________________________________________________  __        Interpretation Summary  Moderately reduced systolic function. EF 30-35%. Global hypokinesis with  akinesis of the basal-mid inferior and basal-mid inferolateral walls.  Global right ventricular function is mildly reduced.  Status post transcatheter mitral valve repair with Mitraclip. The clip is  attached to the leaflets. There is trace to mild mitral regurgitation. Mean  gradient 5 mmHg at heart rate 97  bpm.  Pulmonary hypertension with right ventricular systolic pressure 41mmHg above  the right atrial pressure present.  No pericardial effusion.     Compared to the prior study 8/10/17 there has been no significant change.  _____________________________________________________________________________  __        Left Ventricle  Left ventricular wall thickness is normal. Mild left ventricular dilation is  present. Global hypokinesis with akinesis of the basal-mid inferior and basal-  mid inferolateral walls. Moderately reduced systolic function. EF 30-35%.  Diastolic function not assessed.     Right Ventricle  The right ventricle is normal size. Global right ventricular function is  mildly reduced. A pacemaker lead is noted in the right ventricle.     Atria  Both atria appear normal.     Mitral Valve  Status post transcatheter mitral valve repair with Mitraclip. The clip is  attached to the leaflets. There is trace to mild mitral regurgitation. Mean  gradient 5 mmHg at heart rate 97 bpm.        Aortic Valve  Aortic valve is normal in structure and function.     Tricuspid Valve  Mild tricuspid insufficiency is present. Right ventricular systolic pressure  is 41mmHg above the right atrial pressure.     Pulmonic Valve  The pulmonic valve is normal.     Vessels  The aorta root is normal. The inferior vena cava was normal in size with  preserved respiratory variability. Estimated mean right atrial pressure is 3  mmHg.     Pericardium  No pericardial effusion is present.        Attestation  I have personally viewed the imaging and agree with the interpretation and  report as documented by the fellow, David Hill, and/or edited by me.  _____________________________________________________________________________  __  MMode/2D Measurements & Calculations     IVSd: 0.72 cm  LVIDd: 4.9 cm  LVIDs: 4.2 cm  LVPWd: 0.73 cm  FS: 14.2 %  EDV(Teich): 115.5 ml  ESV(Teich): 80.6 ml  LV mass(C)d: 118.2 grams  LV mass(C)dI: 76.1  grams/m2     EF(MOD-bp): 34.1 %  LA Volume (BP): 49.5 ml  LA Volume Index (BP): 31.9 ml/m2  RWT: 0.30           Doppler Measurements & Calculations  MV E max isabel: 126.4 cm/sec  MV A max isabel: 46.5 cm/sec  MV E/A: 2.7  MV max P.7 mmHg  MV mean P.6 mmHg  MV V2 VTI: 26.5 cm  MV dec time: 0.13 sec  TR max isabel: 321.2 cm/sec  TR max P.3 mmHg     _____________________________________________________________________________  __           Report approved by: Isabel Alex 12/15/2017 12:24 PM      ECHO COMPLETE WITH OPTISON    Narrative    [id number]  [facility]  [id number]  [id number]^MEGHAN^IVA^AMBER           [facility]  [facility]  [address] [place] [address]     Name: SYDNIE SIERRA  MRN: [id number]  : 1965  Study Date: 08/10/2017 09:02 AM  Age: 52 yrs  Gender: Male  [place] [facility] [place]  Reason For Study: Nonrheumatic mitral (valve) insufficiency  Ordering Physician: IVA CHRISTIANSON  Referring Physician: IVA CHRISTIANSON  Performed By: True Newman RDCS     BSA: 1.5 m2  Height: 62 in  Weight: 118 lb  _____________________________________________________________________________  __        Procedure  Echocardiogram with two-dimensional, color and spectral Doppler performed.  _____________________________________________________________________________  __        Interpretation Summary  Moderately (EF 30-35%) reduced left ventricular function is present (bi-plane  32%). Basal lateral, inferior, and inferoseptal hypokinesis.  Global RV function mildly reduced.  S/p trans-catheter MVR 2017. Mild mitral insufficiency is present. Mean  valve gradient 5 mmHg at a heart rate of 104 bpm.  Right ventricular systolic pressure is 40mmHg above the right atrial pressure.  The inferior vena cava is normal in size with preserved respiratory  variability.  No pericardial effusion is  present.  _____________________________________________________________________________  __        Left Ventricle  Left ventricular wall thickness is normal. Left ventricular size is normal.  Diastolic function cannot be assessedBiplane traced at 32%. Moderately (EF 35-  40%) reduced left ventricular function is present. Basal lateral and inferior,  basal and mid inferoseptal, basal inferior hypokinesis.     Right Ventricle  The right ventricle is normal size. Global right ventricular function is  mildly reduced.     Atria  The right atria appears normal. Mild left atrial enlargement is present. The  atrial septum is intact as assessed by color Doppler .        Mitral Valve  S/p mitral valve repair. Mean gradient across the valve is 5 mmHg. Mild mitral  annular calcification is present. Mild mitral insufficiency is present. The  mean mitral valve gradient is 4.6 mmHg.     Aortic Valve  The aortic valve is tricuspid. Trace aortic insufficiency is present.     Tricuspid Valve  Mild tricuspid insufficiency is present. Right ventricular systolic pressure  is 40mmHg above the right atrial pressure.     Pulmonic Valve  The pulmonic valve is normal. Trace pulmonic insufficiency is present.     Vessels  The aorta root is normal. The inferior vena cava was normal in size with  preserved respiratory variability. Estimated mean right atrial pressure is 3  mmHg.     Pericardium  No pericardial effusion is present.        Compared to Previous Study  This study was compared with the study from 6/8/2017 . The RVSP is marginally  higher.     Attestation  I have personally viewed the imaging and agree with the interpretation and  report as documented by the fellow, Dmitriy Merino, and/or edited by me.  _____________________________________________________________________________  __     MMode/2D Measurements & Calculations  IVSd: 0.73 cm  LVIDd: 5.3 cm  LVIDs: 4.5 cm  LVPWd: 0.46 cm  FS: 14.0 %  EDV(Teich): 134.6 ml  ESV(Teich): 94.8  ml  LV mass(C)d: 103.8 grams  LV mass(C)dI: 67.9 grams/m2  Ao root diam: 2.6 cm  asc Aorta Diam: 3.1 cm  LVOT diam: 1.8 cm  LVOT area: 2.6 cm2        Doppler Measurements & Calculations  MV max P.3 mmHg  MV mean P.6 mmHg  MV V2 VTI: 23.7 cm  TR max isabel: 317.6 cm/sec  TR max P.4 mmHg        _____________________________________________________________________________  __        Report approved by: Isabel Daniel 08/10/2017 03:02 PM          Assessment and Plan:   Mr. 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 He presents to [facility] for routine follow up.     Chronic systolic heart failure secondary to ICM.    Stage C, NYHA Class II  ACEi/ARB Lisinopril 2.5 mg po daily, prior reduction due to renal function.   BB Increase Toprol XL to 37.5 mg po daily   Aldosterone antagonist contraindicated due to transient renal function   SCD prophylaxis ICD.   Fluid status Euvolemic.     CAD. S/p POBA RCA, FEMI to RCA times 7, LCx, and LAD 3/27/17 with refractory cardiogenic shock s/p IABP transitioned to subclavian balloon pump inpatient.   - ASA, Statin, BB, and Plavix.     Left IJ DVT. Provoked event, present on US 17, but nonocclusive. Initial diagnosis 17.  - Coumadin indicated for 3 months following provoked event. Currently on triple therapy with ASA, Plavix, and Coumadin.   - Given watershed ischemic CVA obtain US prior to discontinuation.     CVA. Bilateral hemispheric infarcts secondary to watershed ischemia.   - Currently on triple therapy as above.   - Reviewed with Neurology for need for long term anticoagulation from their perspective, not indicated.     MR s/p Mitral clip. Implanted 17.  Secondary to Ischemic MR.  - Echo 12/15/17 consistent with mitral clip in place with mild MR with mean gradient 5 mmHg.     Follow up in [facility] clinic in 3 weeks.       Shyanne Montalvo  12/20/2017      IVA MARTINEZ

## 2017-12-22 ENCOUNTER — OFFICE VISIT (OUTPATIENT)
Dept: INTERNAL MEDICINE | Facility: CLINIC | Age: 52
End: 2017-12-22
Payer: COMMERCIAL

## 2017-12-22 VITALS
TEMPERATURE: 98.1 F | HEART RATE: 89 BPM | OXYGEN SATURATION: 100 % | WEIGHT: 124.3 LBS | DIASTOLIC BLOOD PRESSURE: 60 MMHG | SYSTOLIC BLOOD PRESSURE: 102 MMHG | HEIGHT: 62 IN | BODY MASS INDEX: 22.87 KG/M2

## 2017-12-22 DIAGNOSIS — L70.0 ACNE VULGARIS: ICD-10-CM

## 2017-12-22 DIAGNOSIS — E11.9 TYPE 2 DIABETES MELLITUS WITHOUT COMPLICATION, WITHOUT LONG-TERM CURRENT USE OF INSULIN (H): Primary | ICD-10-CM

## 2017-12-22 DIAGNOSIS — M79.604 BILATERAL LEG PAIN: ICD-10-CM

## 2017-12-22 DIAGNOSIS — M79.605 BILATERAL LEG PAIN: ICD-10-CM

## 2017-12-22 PROCEDURE — 99214 OFFICE O/P EST MOD 30 MIN: CPT | Mod: 25 | Performed by: INTERNAL MEDICINE

## 2017-12-22 PROCEDURE — 99207 C FOOT EXAM  NO CHARGE: CPT | Performed by: INTERNAL MEDICINE

## 2017-12-22 RX ORDER — METFORMIN HCL 500 MG
1000 TABLET, EXTENDED RELEASE 24 HR ORAL
Qty: 180 TABLET | Refills: 3 | Status: SHIPPED | OUTPATIENT
Start: 2017-12-22 | End: 2018-03-12

## 2017-12-22 RX ORDER — GLIPIZIDE 5 MG/1
5 TABLET ORAL
Qty: 180 TABLET | Refills: 3 | Status: SHIPPED | OUTPATIENT
Start: 2017-12-22 | End: 2018-12-06

## 2017-12-22 RX ORDER — TRETINOIN 0.5 MG/G
CREAM TOPICAL
Qty: 45 G | Refills: 11 | Status: SHIPPED | OUTPATIENT
Start: 2017-12-22 | End: 2018-10-01

## 2017-12-22 NOTE — NURSING NOTE
"Chief Complaint   Patient presents with     Diabetes     Having rash and itching at the site of Insulin injections and requesting to stop insulin and start on oral meds.       Initial /60 (BP Location: Left arm, Patient Position: Chair, Cuff Size: Adult Regular)  Pulse 89  Temp 98.1  F (36.7  C) (Oral)  Ht 5' 2\" (1.575 m)  Wt 124 lb 4.8 oz (56.4 kg)  SpO2 100%  BMI 22.73 kg/m2 Estimated body mass index is 22.73 kg/(m^2) as calculated from the following:    Height as of this encounter: 5' 2\" (1.575 m).    Weight as of this encounter: 124 lb 4.8 oz (56.4 kg).  Medication Reconciliation: complete     Kaminibose MA      "

## 2017-12-22 NOTE — MR AVS SNAPSHOT
After Visit Summary   12/22/2017    Luke Henao    MRN: [id number]           Patient Information     Date Of Birth          1965        Visit Information        Provider Department      12/22/2017 1:15 PM Shanna Church MD; [employer] [facility]        Today's Diagnoses     Type 2 diabetes mellitus without complication, without long-term current use of insulin (H)    -  1    Bilateral leg pain        Acne vulgaris          Care Instructions    For diabetes:    STOP Lantus.    START Metformin - 1 pill at night for 1 week, then increase to 2 pills at night with supper.    START Glipizide XL daily.    ---    For leg pain:    Leg ABIs - schedule on the way out.     ---    For acne:    Tretinoin gel at NIGHT before bed.    Benzoyl peroxide in the morning before work.     Moisturize as needed.               Follow-ups after your visit        Your next 10 appointments already scheduled     Dec 26, 2017  9:45 AM CST   Anticoagulation Visit with [facility]   [facility]    [address] [id number] [phone]            Jan 12, 2018  1:00 PM CST   Lab with UC LAB   [facility] ([facility] [place])    [address] [id number] [phone]            Jan 12, 2018  1:30 PM CST   (Arrive by 1:15 PM)   CORE RETURN with Vandana Zambrano NP   [facility] ([facility] [place])    [address] [facility]  [address] [facility] [address] [phone]            Feb 16, 2018 10:00 AM CST   (Arrive by 9:45 AM)   New Patient Visit with Vishal Schaffer MD   [facility] ([facility] [place])    [address] [id number] [phone]            Feb 26, 2018 11:00 AM CST   (Arrive by 10:45 AM)   Implanted Defibulator with Uc Cv  "Device 1   [facility] ([facility])    [address] [address]   [phone]            Feb 26, 2018 11:30 AM CST   (Arrive by 11:15 AM)   RETURN ARRHYTHMIA with RUBI Carreon CNP   [facility] ([facility])    [address] [address]   [phone]              Future tests that were ordered for you today     Open Future Orders        Priority Expected Expires Ordered    US EDIN Doppler with Exercise Bilateral Routine  12/22/2018 12/22/2017            Who to contact     If you have questions or need follow up information about today's clinic visit or your schedule please contact [facility] directly at [phone].  Normal or non-critical lab and imaging results will be communicated to you by MyChart, letter or phone within 4 business days after the clinic has received the results. If you do not hear from us within 7 days, please contact the clinic through MyChart or phone. If you have a critical or abnormal lab result, we will notify you by phone as soon as possible.  Submit refill requests through [employer] or call your pharmacy and they will forward the refill request to us. Please allow 3 business days for your refill to be completed.          Additional Information About Your Visit        [employer] Information     [employer] lets you send messages to your doctor, view your test results, renew your prescriptions, schedule appointments and more. To sign up, go to [email or web address] . Click on \"Log in\" on the left side of the screen, which will take you to the Welcome page. Then click on \"Sign up Now\" on the right side of the page.     You will be asked to enter the access code listed below, as well as some personal information. Please follow the directions to create your username and password.     Your access code is: [email or web address]  Expires: " "2017  5:30 AM     Your access code will  in 90 days. If you need help or a new code, please call your [place] clinic or [phone].        Care EveryWhere ID     This is your Care EveryWhere ID. This could be used by other organizations to access your [place] medical records  [id number]        Your Vitals Were     Pulse Temperature Height Pulse Oximetry BMI (Body Mass Index)       89 98.1  F (36.7  C) (Oral) 5' 2\" (1.575 m) 100% 22.73 kg/m2        Blood Pressure from Last 3 Encounters:   17 102/60   17 121/84   12/15/17 115/79    Weight from Last 3 Encounters:   17 124 lb 4.8 oz (56.4 kg)   17 123 lb 11.2 oz (56.1 kg)   12/15/17 122 lb 9.6 oz (55.6 kg)              We Performed the Following     FOOT EXAM          Today's Medication Changes          These changes are accurate as of: 17  2:04 PM.  If you have any questions, ask your nurse or doctor.               Start taking these medicines.        Dose/Directions    Benzoyl Peroxide 2.5 % Crea   Used for:  Acne vulgaris   Started by:  Shanna Church MD        Externally apply topically daily   Quantity:  21 g   Refills:  11       glipiZIDE 5 MG tablet   Commonly known as:  GLUCOTROL   Used for:  Type 2 diabetes mellitus without complication, without long-term current use of insulin (H)   Started by:  Shanna Church MD        Dose:  5 mg   Take 1 tablet (5 mg) by mouth 2 times daily (before meals)   Quantity:  180 tablet   Refills:  3       metFORMIN 500 MG 24 hr tablet   Commonly known as:  GLUCOPHAGE-XR   Used for:  Type 2 diabetes mellitus without complication, without long-term current use of insulin (H)   Started by:  Shanna Church MD        Dose:  1000 mg   Take 2 tablets (1,000 mg) by mouth daily (with dinner)   Quantity:  180 tablet   Refills:  3       tretinoin 0.05 % cream   Commonly known as:  RETIN-A   Used for:  Acne vulgaris   Started by:  Shanna Church MD        Spread a pea size amount " into affected area topically at bedtime.  Use sunscreen SPF>20.   Quantity:  45 g   Refills:  11            Where to get your medicines      These medications were sent to [facility]/pharmacy #[id number] - [facility] [address] [address]     Phone:  [phone]     Benzoyl Peroxide 2.5 % Crea    glipiZIDE 5 MG tablet    metFORMIN 500 MG 24 hr tablet    tretinoin 0.05 % cream                Primary Care Provider Office Phone # Fax #    Shanna Church [phone]       [address] [facility] [address]        Equal Access to Services     CUBA AYALA : Hadii aad ku hadasho Soomaali, waaxda luqadaha, qaybta kaalmada adeegyada, waxnandini rosen. So [facility] [phone].    ATENCIÓN: Si habla español, tiene a suarez disposición servicios gratuitos de asistencia lingüística. Llame al [phone].    We comply with applicable federal civil rights laws and [place] laws. We do not discriminate on the basis of race, color, national origin, age, disability, sex, sexual orientation, or gender identity.            Thank you!     Thank you for choosing [facility]  for your care. Our goal is always to provide you with excellent care. Hearing back from our patients is one way we can continue to improve our services. Please take a few minutes to complete the written survey that you may receive in the mail after your visit with us. Thank you!             Your Updated Medication List - Protect others around you: Learn how to safely use, store and throw away your medicines at www.disposemymeds.org.          This list is accurate as of: 12/22/17  2:04 PM.  Always use your most recent med list.                   Brand Name Dispense Instructions for use Diagnosis    alcohol swab prep pads     100 each    Use to swab area of injection/mary alice as directed 3 x per day        ASPIRIN NOT PRESCRIBED    INTENTIONAL    0 each    Please choose  reason not prescribed, below    Cardiogenic shock (H), Type 2 diabetes mellitus without complication, with long-term current use of insulin (H)       atorvastatin 40 MG tablet    LIPITOR    60 tablet    1 tablet (40 mg) by Oral or Feeding Tube route daily    Coronary artery disease involving native coronary artery of native heart without angina pectoris, Cardiogenic shock (H)       Benzoyl Peroxide 2.5 % Crea     21 g    Externally apply topically daily    Acne vulgaris       bumetanide 0.5 MG tablet    BUMEX    45 tablet    Take 1 tablet (0.5 mg) by mouth every other day    Ischemic cardiomyopathy       clopidogrel 75 MG tablet    PLAVIX    60 tablet    Take 1 tablet (75 mg) by mouth daily    ST elevation myocardial infarction (STEMI), unspecified artery (H)       digoxin 125 MCG tablet    LANOXIN    60 tablet    Take 1 tablet (125 mcg) by mouth daily    Coronary artery disease involving native coronary artery of native heart without angina pectoris, Cardiogenic shock (H)       finasteride 5 MG tablet    PROSCAR    60 tablet    Take 1 tablet (5 mg) by mouth daily    Hypertrophy of prostate with urinary obstruction       glipiZIDE 5 MG tablet    GLUCOTROL    180 tablet    Take 1 tablet (5 mg) by mouth 2 times daily (before meals)    Type 2 diabetes mellitus without complication, without long-term current use of insulin (H)       insulin pen needle 32G X 4 MM    BD KYLEE U/F    100 each    Use 3 daily or as directed.    Diabetes mellitus type 2, insulin dependent (H)       lisinopril 2.5 MG tablet    PRINIVIL/Zestril    30 tablet    Take 1 tablet (2.5 mg) by mouth daily    Ischemic cardiomyopathy       metFORMIN 500 MG 24 hr tablet    GLUCOPHAGE-XR    180 tablet    Take 2 tablets (1,000 mg) by mouth daily (with dinner)    Type 2 diabetes mellitus without complication, without long-term current use of insulin (H)       metoprolol 25 MG 24 hr tablet    TOPROL-XL    30 tablet    Take 1.5 tablets (37.5 mg) by mouth daily         ONETOUCH ULTRA test strip   Generic drug:  blood glucose monitoring     100 strip    USE TO TEST BLOOD SUGAR 3 TIMES DAILY OR AS DIRECTED.    Diabetes mellitus type 2, insulin dependent (H)       potassium chloride SA 20 MEQ CR tablet    K-DUR/KLOR-CON M    90 tablet    Take 1 tablet (20 mEq) by mouth daily    Ischemic cardiomyopathy       Sharps Container Misc     1 each    1 each every 30 days    Diabetes mellitus type 2, insulin dependent (H)       tamsulosin 0.4 MG capsule    FLOMAX    60 capsule    Take 1 capsule (0.4 mg) by mouth daily    Ischemic cardiomyopathy       tretinoin 0.05 % cream    RETIN-A    45 g    Spread a pea size amount into affected area topically at bedtime.  Use sunscreen SPF>20.    Acne vulgaris       * Warfarin Therapy Reminder      1 each continuous prn    Deep vein thrombosis (DVT) of left lower extremity, unspecified chronicity, unspecified vein (H)       * warfarin 3 MG tablet    COUMADIN    100 tablet    Take one tablet daily as directed by the ACC    Deep vein thrombosis (DVT) of left lower extremity, unspecified chronicity, unspecified vein (H)       zinc sulfate 220 (50 ZN) MG capsule    ZINCATE    2 capsule    Take 1 capsule (220 mg) by mouth daily    Ischemic cardiomyopathy       * Notice:  This list has 2 medication(s) that are the same as other medications prescribed for you. Read the directions carefully, and ask your doctor or other care provider to review them with you.

## 2017-12-22 NOTE — PATIENT INSTRUCTIONS
For diabetes:    STOP Lantus.    START Metformin - 1 pill at night for 1 week, then increase to 2 pills at night with supper.    START Glipizide XL daily.    ---    For leg pain:    Leg ABIs - schedule on the way out.     ---    For acne:    Tretinoin gel at NIGHT before bed.    Benzoyl peroxide in the morning before work.     Moisturize as needed.

## 2017-12-23 NOTE — PROGRESS NOTES
"  SUBJECTIVE:                                                      HPI: Luke Henao is a pleasant 52 year old male who presents with multiple concerns:     utilized.     1. Patient would like to try oral hypoglycemics for his diabetes:  - was diagnosed with diabetes during hospitalization earlier this year  - was immediately started on insulin - no trial of oral hypoglycemics, likely due to the severity of his illness  - HgbA1c prior to treatment was 7.5%, most current HgbA1c was 6.0%  - patient is starting to develop irritation at his insulin injection sites and would like to stop if possible    2. His legs continue to hurt:  - ongoing now for many months  - both legs symmetrically  - upper legs more than lower legs  - precipitated and exacerbated by activity (like walking and stairs)  - he did undergo cardiac rehab after his hospitalization, but did not undergo physical therapy    3. Acne:  - started this year; not present prior  - involves much of his face  - not on any treatments, prescription or topical, at this time    The medication, allergy, and problem lists have been reviewed and updated as appropriate.       OBJECTIVE:                                                      /60 (BP Location: Left arm, Patient Position: Chair, Cuff Size: Adult Regular)  Pulse 89  Temp 98.1  F (36.7  C) (Oral)  Ht 5' 2\" (1.575 m)  Wt 124 lb 4.8 oz (56.4 kg)  SpO2 100%  BMI 22.73 kg/m2  Constitutional: well-appearing  Musculoskeletal: normal gait and station; legs appear normal, normal strength, normal range of motion, and non-tender throughout; feet intact - no lesions or calluses, DP pulses palpable bilaterally, sensation intact to light touch   Integumentary: combination inflamed and flesh-colored papules over much of face      ASSESSMENT/PLAN:                                                      (E11.9) Type 2 diabetes mellitus without complication, without long-term current use of insulin (H)  " (primary encounter diagnosis)  Comment: patient would like to try oral regimen (has not been tried in the past).  Plan:    - STOP Lantus.   - START metformin XR 500mg with supper x 1 week, then 1000mg with supper x 1 week.   - glipizide 5mg twice a day.   - follow-up in 3 months for diabetes focused follow-up.    (M79.604,  M79.605) Bilateral leg pain  Comment:    - etiology unclear.   - suspect deconditioning from prolonged hospitalization earlier this year.   - important to evaluate for PAD in light of known CAD.   Plan:    - bilateral exercise ultrasound ABIs ordered - patient to schedule.    - if above unrevealing, recommend referral to PT for lower extremity and core strengthening.     (L70.0) Acne vulgaris  Plan:    - benzoyl peroxide 2.5% cream qam.   - tretinoin 0.05 % cream qpm.          - gentle skin care and sunscreen encouraged.     The instructions on the AVS were discussed and explained to the patient. Patient expressed understanding of instructions.    A total of 25 minutes were spent face-to-face with this patient during this encounter and over half of that time was spent on counseling and coordination of care re: above diagnoses and plans of care.     (Chart documentation was completed, in part, with [employer] voice-recognition software. Even though reviewed, some grammatical, spelling, and word errors may remain.)    Shanna Church MD   [address] [address]  T: [phone], F: [phone]

## 2017-12-26 ENCOUNTER — ANTICOAGULATION THERAPY VISIT (OUTPATIENT)
Dept: ANTICOAGULATION | Facility: CLINIC | Age: 52
End: 2017-12-26
Payer: COMMERCIAL

## 2017-12-26 LAB — INR POINT OF CARE: 2.3 (ref 0.86–1.14)

## 2017-12-26 PROCEDURE — 99207 ZZC NO CHARGE NURSE ONLY: CPT

## 2017-12-26 PROCEDURE — 85610 PROTHROMBIN TIME: CPT | Mod: QW

## 2017-12-26 PROCEDURE — 36416 COLLJ CAPILLARY BLOOD SPEC: CPT

## 2017-12-26 NOTE — MR AVS SNAPSHOT
Luke DEBBI Henao   12/26/2017 9:45 AM   Anticoagulation Therapy Visit    Description:  52 year old male   Provider:  [employer];  ANTICOAGULATION CLINIC   Department:   Anti Coagulation           INR as of 12/26/2017     Today's INR 2.3      Anticoagulation Summary as of 12/26/2017     INR goal 2.0-3.0   Today's INR 2.3   Full instructions 1.5 mg on Mon, Wed, Fri; 3 mg all other days   Next INR check 1/23/2018    Indications   Cerebrovascular accident (CVA) due to thrombosis of cerebral artery (H) (Resolved) [I63.30]  DVT (deep venous thrombosis) (H) [I82.409]  Long-term (current) use of anticoagulants [Z79.01] [Z79.01]         Your next Anticoagulation Clinic appointment(s)     Jan 23, 2018 10:00 AM CST   Anticoagulation Visit with  ANTICOAGULATION CLINIC   [facility] ([facility])    [address] [address] [phone]              Contact Numbers     [facility]  Please call  [phone] to cancel and/or reschedule your appointment   Please call  [phone] with any problems or questions regarding your therapy.        December 2017 Details    Sun Mon Tue Wed Thu Fri Sat          1               2                 3               4               5               6               7               8               9                 10               11               12               13               14               15               16                 17               18               19               20               21               22               23                 24               25               26      3 mg   See details      27      1.5 mg         28      3 mg         29      1.5 mg         30      3 mg           31      3 mg                Date Details   12/26 This INR check               How to take your warfarin dose     To take:  1.5 mg Take 0.5 of a 3 mg tablet.    To take:  3 mg Take 1 of the 3 mg  tablets.           January 2018 Details    Sun Mon Tue Wed Thu Fri Sat      1      1.5 mg         2      3 mg         3      1.5 mg         4      3 mg         5      1.5 mg         6      3 mg           7      3 mg         8      1.5 mg         9      3 mg         10      1.5 mg         11      3 mg         12      1.5 mg         13      3 mg           14      3 mg         15      1.5 mg         16      3 mg         17      1.5 mg         18      3 mg         19      1.5 mg         20      3 mg           21      3 mg         22      1.5 mg         23            24               25               26               27                 28               29               30               31                   Date Details   No additional details    Date of next INR:  1/23/2018         How to take your warfarin dose     To take:  1.5 mg Take 0.5 of a 3 mg tablet.    To take:  3 mg Take 1 of the 3 mg tablets.

## 2017-12-26 NOTE — PROGRESS NOTES
ANTICOAGULATION FOLLOW-UP CLINIC VISIT    Patient Name:  Luke Henao  Date:  12/26/2017  Contact Type:  Face to Face    SUBJECTIVE:     Patient Findings            OBJECTIVE    INR Protime   Date Value Ref Range Status   12/26/2017 2.3 (A) 0.86 - 1.14 Final       ASSESSMENT / PLAN  INR assessment THER    Recheck INR In: 4 WEEKS    INR Location Clinic      Anticoagulation Summary as of 12/26/2017     INR goal 2.0-3.0   Today's INR 2.3   Maintenance plan 1.5 mg (3 mg x 0.5) on Mon, Wed, Fri; 3 mg (3 mg x 1) all other days   Full instructions 1.5 mg on Mon, Wed, Fri; 3 mg all other days   Weekly total 16.5 mg   Plan last modified Pina Coyle RN (11/13/2017)   Next INR check 1/23/2018   Target end date     Indications   Cerebrovascular accident (CVA) due to thrombosis of cerebral artery (H) (Resolved) [I63.30]  DVT (deep venous thrombosis) (H) [I82.409]  Long-term (current) use of anticoagulants [Z79.01] [Z79.01]         Anticoagulation Episode Summary     INR check location [facility]    Preferred lab     Send INR reminders to [facility]    Comments             See the Encounter Report to view Anticoagulation Flowsheet and Dosing Calendar (Go to Encounters tab in chart review, and find the Anticoagulation Therapy Visit)    Dosage adjustment made based on physician directed care plan.    Velvet Jones, RN

## 2017-12-27 ENCOUNTER — HOSPITAL ENCOUNTER (OUTPATIENT)
Dept: ULTRASOUND IMAGING | Facility: CLINIC | Age: 52
Discharge: HOME | End: 2017-12-27
Attending: INTERNAL MEDICINE | Admitting: INTERNAL MEDICINE
Payer: COMMERCIAL

## 2017-12-27 DIAGNOSIS — M79.605 BILATERAL LEG PAIN: ICD-10-CM

## 2017-12-27 DIAGNOSIS — M79.604 BILATERAL LEG PAIN: ICD-10-CM

## 2017-12-27 PROCEDURE — 93924 LWR XTR VASC STDY BILAT: CPT

## 2018-01-11 ENCOUNTER — PRE VISIT (OUTPATIENT)
Dept: CARDIOLOGY | Facility: CLINIC | Age: 53
End: 2018-01-11

## 2018-01-11 DIAGNOSIS — I50.22 CHRONIC SYSTOLIC HEART FAILURE (H): Primary | ICD-10-CM

## 2018-01-12 ENCOUNTER — RADIANT APPOINTMENT (OUTPATIENT)
Dept: ULTRASOUND IMAGING | Facility: CLINIC | Age: 53
End: 2018-01-12
Attending: NURSE PRACTITIONER
Payer: COMMERCIAL

## 2018-01-12 ENCOUNTER — OFFICE VISIT (OUTPATIENT)
Dept: CARDIOLOGY | Facility: CLINIC | Age: 53
End: 2018-01-12
Attending: NURSE PRACTITIONER
Payer: COMMERCIAL

## 2018-01-12 VITALS
BODY MASS INDEX: 22.39 KG/M2 | WEIGHT: 121.7 LBS | DIASTOLIC BLOOD PRESSURE: 93 MMHG | HEART RATE: 98 BPM | OXYGEN SATURATION: 99 % | HEIGHT: 62 IN | SYSTOLIC BLOOD PRESSURE: 141 MMHG

## 2018-01-12 DIAGNOSIS — I25.5 ISCHEMIC CARDIOMYOPATHY: ICD-10-CM

## 2018-01-12 DIAGNOSIS — I82.722 ARM DVT (DEEP VENOUS THROMBOEMBOLISM), CHRONIC, LEFT (H): ICD-10-CM

## 2018-01-12 DIAGNOSIS — I25.10 CORONARY ARTERY DISEASE INVOLVING NATIVE CORONARY ARTERY OF NATIVE HEART WITHOUT ANGINA PECTORIS: ICD-10-CM

## 2018-01-12 DIAGNOSIS — I50.22 CHRONIC SYSTOLIC CONGESTIVE HEART FAILURE (H): Primary | ICD-10-CM

## 2018-01-12 DIAGNOSIS — I50.22 CHRONIC SYSTOLIC HEART FAILURE (H): ICD-10-CM

## 2018-01-12 LAB
ANION GAP SERPL CALCULATED.3IONS-SCNC: 6 MMOL/L (ref 3–14)
BUN SERPL-MCNC: 19 MG/DL (ref 7–30)
CALCIUM SERPL-MCNC: 9 MG/DL (ref 8.5–10.1)
CHLORIDE SERPL-SCNC: 105 MMOL/L (ref 94–109)
CO2 SERPL-SCNC: 28 MMOL/L (ref 20–32)
CREAT SERPL-MCNC: 1.04 MG/DL (ref 0.66–1.25)
GFR SERPL CREATININE-BSD FRML MDRD: 75 ML/MIN/1.7M2
GLUCOSE SERPL-MCNC: 171 MG/DL (ref 70–99)
POTASSIUM SERPL-SCNC: 3.7 MMOL/L (ref 3.4–5.3)
SODIUM SERPL-SCNC: 140 MMOL/L (ref 133–144)

## 2018-01-12 PROCEDURE — 99213 OFFICE O/P EST LOW 20 MIN: CPT | Mod: 24 | Performed by: NURSE PRACTITIONER

## 2018-01-12 PROCEDURE — 80048 BASIC METABOLIC PNL TOTAL CA: CPT | Performed by: NURSE PRACTITIONER

## 2018-01-12 PROCEDURE — T1013 SIGN LANG/ORAL INTERPRETER: HCPCS | Mod: U3

## 2018-01-12 PROCEDURE — T1013 SIGN LANG/ORAL INTERPRETER: HCPCS | Mod: U3,ZF

## 2018-01-12 PROCEDURE — G0463 HOSPITAL OUTPT CLINIC VISIT: HCPCS | Mod: ZF

## 2018-01-12 PROCEDURE — 36415 COLL VENOUS BLD VENIPUNCTURE: CPT | Performed by: NURSE PRACTITIONER

## 2018-01-12 RX ORDER — DIGOXIN 125 MCG
TABLET ORAL
Qty: 60 TABLET | Refills: 5 | Status: SHIPPED | OUTPATIENT
Start: 2018-01-12 | End: 2019-04-10

## 2018-01-12 RX ORDER — METOPROLOL SUCCINATE 25 MG/1
50 TABLET, EXTENDED RELEASE ORAL DAILY
Qty: 30 TABLET | Refills: 3 | Status: SHIPPED | OUTPATIENT
Start: 2018-01-12 | End: 2018-01-18

## 2018-01-12 RX ORDER — ASPIRIN 81 MG/1
81 TABLET, CHEWABLE ORAL DAILY
Qty: 36 TABLET | Refills: 3 | Status: SHIPPED | OUTPATIENT
Start: 2018-01-12 | End: 2018-09-05

## 2018-01-12 ASSESSMENT — PAIN SCALES - GENERAL: PAINLEVEL: NO PAIN (0)

## 2018-01-12 NOTE — MR AVS SNAPSHOT
"              After Visit Summary   2018    Luke Henao    MRN: [id number]           Patient Information     Date Of Birth          1965        Visit Information        Provider Department      2018 1:15 PM Cristobal, Son; Vandana Zambrano NP [facility]        Today's Diagnoses     Ischemic cardiomyopathy    -  1    Chronic systolic congestive heart failure (H)        Coronary artery disease involving native coronary artery of native heart without angina pectoris        Cardiogenic shock (H)          Care Instructions    You were seen today in the Cardiovascular Clinic at the [facility].     Cardiology Providers you saw during your visit: Vandana Zambrano NP      1.  Discontinue Warfarin  2.  Decrease your digoxin to 0.125 mg on , , , and .  3.  Increase your metoprolol to 50 mg daily.   4.  Follow up in CORE clinic in 2-3 months.   Please make a follow-up CORE/heart failure appt with Dr. Schaffer as scheduled on .      Please limit your fluid intake to 2 L (64 ounces) daily.  2 Liters a day = 8.5 cups, or 72 ounces.  Please limit your salt intake to 2 grams a day or less.    If you gain 2# in 24 hours or 5# in one week call Herminia Jiménez RN so we can adjust your medications as needed over the phone.    Please feel free to call me with any questions or concerns.    Vandana VENEGAS CNP      [facility]  Cardiology Care Coordinator-Heart Failure Clinic    Questions and [phone].   First press #1 for the [facility] and then press #3 for \"Medical Questions\" to reach us Cardiology Nurses.     On Call Cardiologist for after hours or on weekends: [phone]   option #4 and ask to speak to the on-call Cardiologist. Inform them you are a CORE/heart failure patient at the [facility].        If you need a medication refill please contact your pharmacy.  Please allow 3 business days for your refill to be " completed.  _______________________________________________________  C.O.R.E. CLINIC Cardiomyopathy, Optimization, Rehabilitation, Education   The C.O.R.E. CLINIC is a heart failure specialty clinic within the [facility] where you will work with specialized nurse practitioners dedicated to helping patients with heart failure carefully adjust medications, receive education, and learn who and when to call if symptoms develop. They specialize in helping you better understand your condition, slow the progression of your disease, improve the length and quality of your life, help you detect future heart problems before they become life threatening, and avoid hospitalizations.  As always, thank you for trusting us with your health care needs!              Follow-ups after your visit        Additional Services     Follow-Up with CORE Clinic                 Your next 10 appointments already scheduled     Jan 23, 2018 10:00 AM CST   Anticoagulation Visit with  [facility] CLINIC   [facility]    [address] [place] [id number] [phone]            Feb 16, 2018  9:30 AM CST   Lab with UC LAB   [facility] ([facility])    [address] [place] [address] [id number] [phone]            Feb 16, 2018 10:00 AM CST   (Arrive by 9:45 AM)   New Patient Visit with Vishal Schaffer MD   [facility])    [address] [place] [address]  [address] [place] [address] [id number] [phone]            Feb 22, 2018 11:30 AM CST   (Arrive by 11:15 AM)   Implanted Defibulator with Uc Cv Device 1   [facility] ([facility])    [address] [place] [address]  [address] [place] [address] [id number] [phone]            Feb 22, 2018 12:00 PM CST   (Arrive by 11:45 AM)   RETURN ARRHYTHMIA with Viki Grant,  "RUBI TAYLOR   [facility] ([facility])    [address]  [address]  [facility] [address]   [phone]            Mar 12, 2018 11:30 AM CDT   Lab with RE[facility] LAB   [facility] ([facility])    [address]  [address]st [address]  [facility] [address]   [phone]            Mar 12, 2018 12:00 PM CDT   (Arrive by 11:45 AM)   CORE RETURN with Vandana Zambrano NP   [facility] ([facility])    [address]  [address]  [facility] [address] [phone]              Future tests that were ordered for you today     Open Future Orders        Priority Expected Expires Ordered    Follow-Up with CORE Clinic Routine 3/1/2018 5/31/2018 1/12/2018    Digoxin level Routine 1/26/2018 2/9/2018 1/12/2018            Who to contact     If you have questions or need follow up information about today's clinic visit or your schedule please contact [facility] directly at [phone].  Normal or non-critical lab and imaging results will be communicated to you by [facility], letter or phone within 4 business days after the clinic has received the results. If you do not hear from us within 7 days, please contact the clinic through [facility] or phone. If you have a critical or abnormal lab result, we will notify you by phone as soon as possible.  Submit refill requests through [facility] or call your pharmacy and they will forward the refill request to us. Please allow 3 business days for your refill to be completed.          Additional Information About Your Visit        [facility] Information     [facility] lets you send messages to your doctor, view your test results, renew your prescriptions, schedule appointments and more. To sign up, go to [email or web address] . Click on \"Log in\" on the left side of the screen, which will take you to the Welcome page. Then click on \"Sign up Now\" on the right side of the page.     You will " "be asked to enter the access code listed below, as well as some personal information. Please follow the directions to create your username and password.     Your access code is: [email or web address]  Expires: 4/3/2018  3:45 PM     Your access code will  in 90 days. If you need help or a new code, please call your [place] clinic or [phone].        Care EveryWhere ID     This is your Care EveryWhere ID. This could be used by other organizations to access your [place] medical records  [id number]        Your Vitals Were     Pulse Height Pulse Oximetry BMI (Body Mass Index)          98 1.575 m (5' 2\") 99% 22.26 kg/m2         Blood Pressure from Last 3 Encounters:   18 (!) 141/93   17 102/60   17 121/84    Weight from Last 3 Encounters:   18 55.2 kg (121 lb 11.2 oz)   17 56.4 kg (124 lb 4.8 oz)   17 56.1 kg (123 lb 11.2 oz)              We Performed the Following     Follow-Up with [facility]          Today's Medication Changes          These changes are accurate as of: 18  2:31 PM.  If you have any questions, ask your nurse or doctor.               These medicines have changed or have updated prescriptions.        Dose/Directions    digoxin 125 MCG tablet   Commonly known as:  LANOXIN   This may have changed:    - how much to take  - how to take this  - when to take this  - additional instructions   Used for:  Coronary artery disease involving native coronary artery of native heart without angina pectoris, Cardiogenic shock (H)   Changed by:  Vandana Zambrano NP        Take one tablet on , , , .   Quantity:  60 tablet   Refills:  5       metoprolol succinate 25 MG 24 hr tablet   Commonly known as:  TOPROL-XL   This may have changed:  how much to take   Used for:  Chronic systolic congestive heart failure (H)   Changed by:  Vnadana Zambrano NP        Dose:  50 mg   Take 2 tablets (50 mg) by mouth daily   Quantity:  30 tablet   Refills:  3            Where to " get your medicines      These medications were sent to [id number] - [place], [place] - [address]  [address] [place] [address]     Phone:  [phone]     digoxin 125 MCG tablet    metoprolol succinate 25 MG 24 hr tablet                Primary Care Provider Office Phone # Fax #    Shanna Church [phone]       [address] [facility] [address]        Equal Access to Services     CUBA AYALA : Hadii aad ku hadasho Soomaali, waaxda luqadaha, qaybta kaalmada adeegyada, waxay idiin hayaan adeeg kharash la'aan . So [facility] [phone].    ATENCIÓN: Si habla español, tiene a suarez disposición servicios gratuitos de asistencia lingüística. [facility] [phone].    We comply with applicable federal civil rights laws and [place] laws. We do not discriminate on the basis of race, color, national origin, age, disability, sex, sexual orientation, or gender identity.            Thank you!     Thank you for choosing [facility]  for your care. Our goal is always to provide you with excellent care. Hearing back from our patients is one way we can continue to improve our services. Please take a few minutes to complete the written survey that you may receive in the mail after your visit with us. Thank you!             Your Updated Medication List - Protect others around you: Learn how to safely use, store and throw away your medicines at www.disposemymeds.org.          This list is accurate as of: 1/12/18  2:31 PM.  Always use your most recent med list.                   Brand Name Dispense Instructions for use Diagnosis    alcohol swab prep pads     100 each    Use to swab area of injection/mary alice as directed 3 x per day        ASPIRIN NOT PRESCRIBED    INTENTIONAL    0 each    Please choose reason not prescribed, below    Cardiogenic shock (H), Type 2 diabetes mellitus without complication, with long-term current use of insulin (H)       atorvastatin 40 MG tablet     LIPITOR    60 tablet    1 tablet (40 mg) by Oral or Feeding Tube route daily    Coronary artery disease involving native coronary artery of native heart without angina pectoris, Cardiogenic shock (H)       Benzoyl Peroxide 2.5 % Crea     21 g    Externally apply topically daily    Acne vulgaris       bumetanide 0.5 MG tablet    BUMEX    45 tablet    Take 1 tablet (0.5 mg) by mouth every other day    Ischemic cardiomyopathy       clopidogrel 75 MG tablet    PLAVIX    60 tablet    Take 1 tablet (75 mg) by mouth daily    ST elevation myocardial infarction (STEMI), unspecified artery (H)       digoxin 125 MCG tablet    LANOXIN    60 tablet    Take one tablet on M, W, F, Sunday.    Coronary artery disease involving native coronary artery of native heart without angina pectoris, Cardiogenic shock (H)       finasteride 5 MG tablet    PROSCAR    60 tablet    Take 1 tablet (5 mg) by mouth daily    Hypertrophy of prostate with urinary obstruction       glipiZIDE 5 MG tablet    GLUCOTROL    180 tablet    Take 1 tablet (5 mg) by mouth 2 times daily (before meals)    Type 2 diabetes mellitus without complication, without long-term current use of insulin (H)       insulin pen needle 32G X 4 MM    BD KYLEE U/F    100 each    Use 3 daily or as directed.    Diabetes mellitus type 2, insulin dependent (H)       lisinopril 2.5 MG tablet    PRINIVIL/Zestril    30 tablet    Take 1 tablet (2.5 mg) by mouth daily    Ischemic cardiomyopathy       metFORMIN 500 MG 24 hr tablet    GLUCOPHAGE-XR    180 tablet    Take 2 tablets (1,000 mg) by mouth daily (with dinner)    Type 2 diabetes mellitus without complication, without long-term current use of insulin (H)       metoprolol succinate 25 MG 24 hr tablet    TOPROL-XL    30 tablet    Take 2 tablets (50 mg) by mouth daily    Chronic systolic congestive heart failure (H)       ONETOUCH ULTRA test strip   Generic drug:  blood glucose monitoring     100 strip    USE TO TEST BLOOD SUGAR 3 TIMES  DAILY OR AS DIRECTED.    Diabetes mellitus type 2, insulin dependent (H)       potassium chloride SA 20 MEQ CR tablet    K-DUR/KLOR-CON M    90 tablet    Take 1 tablet (20 mEq) by mouth daily    Ischemic cardiomyopathy       Sharps Container Misc     1 each    1 each every 30 days    Diabetes mellitus type 2, insulin dependent (H)       tamsulosin 0.4 MG capsule    FLOMAX    60 capsule    Take 1 capsule (0.4 mg) by mouth daily    Ischemic cardiomyopathy       tretinoin 0.05 % cream    RETIN-A    45 g    Spread a pea size amount into affected area topically at bedtime.  Use sunscreen SPF>20.    Acne vulgaris       Warfarin Therapy Reminder      1 each continuous prn    Deep vein thrombosis (DVT) of left lower extremity, unspecified chronicity, unspecified vein (H)       zinc sulfate 220 (50 ZN) MG capsule    ZINCATE    2 capsule    Take 1 capsule (220 mg) by mouth daily    Ischemic cardiomyopathy

## 2018-01-12 NOTE — LETTER
1/12/2018      RE: Luke Henao  [address]  [facility] [id number]       Dear Colleague,    Thank you for the opportunity to participate in the care of your patient, Luke Henao, at the [facility] at [facility]. Please see a copy of my visit note below.      HPI:  Luke is a 52 year old gentleman with a past medical history of ICM, CAD with a RCA STEMI s/p PCI x 7 to RCA, left circumflex (now ), and LAD on 3/27/17 complicated by PEDRO, sepsis, sacral ulcer, DVT, bilateral hemispheric infarcts secondary to watershed ischemia, and cardiogenic shock s/p IABP, later transitioned to a subclavian balloon pump and s/p mitral clip for ischemic MR  who returns for routine follow up.    Luke has been feeling well.  He is able to walk 15 minutes without any shortness of breath.  Denies SOB, PND, orthopnea, edema, weight gain, chest pain, palpitations, lightheadedness, dizziness, near syncopal/syncopal episodes.  He has been following his salt and fluid restrictions.      PAST MEDICAL HISTORY:  Past Medical History:   Diagnosis Date     CAD (coronary artery disease) 2017    RCA STEMI s/p balloon angioplasty and multiple Sofie to RCA, LCx, and LAD     Cardiogenic shock (H)      DVT (deep venous thrombosis) (H) 2017    left internal jugular (line-associated); left common femoral; on coumadin     Ischemic cardiomyopathy 2017    EF 30-35%     Ischemic stroke (H) 2017    no residual deficits     Lower GI bleed 2017    due to rectal ulcer     Mitral valve insufficiency, ischemic 2017    s/p Mitral Clip placement      Skin ulcer of sacrum (H)      Systolic heart failure (H)      Type 2 diabetes mellitus (H)        FAMILY HISTORY:  Family History   Problem Relation Age of Onset     Hypertension Mother      Type 2 Diabetes Father      Hypertension Father      Myocardial Infarction No family hx of      CEREBROVASCULAR DISEASE No family hx of      Coronary Artery Disease Early Onset No  family hx of      Colon Cancer No family hx of      Prostate Cancer No family hx of        SOCIAL HISTORY:  Social History     Social History     Marital status:      Spouse name: N/A     Number of children: N/A     Years of education: N/A     Occupational History     H[employer]      Social History Main Topics     Smoking status: Former Smoker     Packs/day: 0.50     Years: 30.00     Types: Cigarettes     Quit date: 1/1/2017     Smokeless tobacco: Never Used     Alcohol use No     Drug use: No     Sexual activity: Not Currently     Other Topics Concern     None     Social History Narrative    . Remarried.    4 children with first marriage.    2 grand children.    Walks daily, but no formal exercise.            CURRENT MEDICATIONS:  Current Outpatient Prescriptions   Medication Sig Dispense Refill     metFORMIN (GLUCOPHAGE-XR) 500 MG 24 hr tablet Take 2 tablets (1,000 mg) by mouth daily (with dinner) 180 tablet 3     glipiZIDE (GLUCOTROL) 5 MG tablet Take 1 tablet (5 mg) by mouth 2 times daily (before meals) 180 tablet 3     metoprolol (TOPROL-XL) 25 MG 24 hr tablet Take 1.5 tablets (37.5 mg) by mouth daily 30 tablet      potassium chloride SA (K-DUR/KLOR-CON M) 20 MEQ CR tablet Take 1 tablet (20 mEq) by mouth daily 90 tablet 3     bumetanide (BUMEX) 0.5 MG tablet Take 1 tablet (0.5 mg) by mouth every other day 45 tablet 3     lisinopril (PRINIVIL/ZESTRIL) 2.5 MG tablet Take 1 tablet (2.5 mg) by mouth daily 30 tablet 1     ONETOUCH ULTRA test strip USE TO TEST BLOOD SUGAR 3 TIMES DAILY OR AS DIRECTED. 100 strip 3     warfarin (COUMADIN) 3 MG tablet Take one tablet daily as directed by the  tablet 0     digoxin (LANOXIN) 125 MCG tablet Take 1 tablet (125 mcg) by mouth daily 60 tablet 5     alcohol swab prep pads Use to swab area of injection/mary alice as directed 3 x per day 100 each 11     atorvastatin (LIPITOR) 40 MG tablet 1 tablet (40 mg) by Oral or Feeding Tube route daily 60 tablet 7  "    clopidogrel (PLAVIX) 75 MG tablet Take 1 tablet (75 mg) by mouth daily 60 tablet 11     finasteride (PROSCAR) 5 MG tablet Take 1 tablet (5 mg) by mouth daily 60 tablet 11     ASPIRIN NOT PRESCRIBED (INTENTIONAL) Please choose reason not prescribed, below 0 each 0     tamsulosin (FLOMAX) 0.4 MG capsule Take 1 capsule (0.4 mg) by mouth daily 60 capsule 0     zinc sulfate (ZINCATE) 220 (50 ZN) MG capsule Take 1 capsule (220 mg) by mouth daily 2 capsule 0     Sharps Container MISC 1 each every 30 days 1 each 0     Warfarin Therapy Reminder 1 each continuous prn       Benzoyl Peroxide 2.5 % CREA Externally apply topically daily (Patient not taking: Reported on 1/12/2018) 21 g 11     tretinoin (RETIN-A) 0.05 % cream Spread a pea size amount into affected area topically at bedtime.  Use sunscreen SPF>20. (Patient not taking: Reported on 1/12/2018) 45 g 11     insulin pen needle (BD KYLEE U/F) 32G X 4 MM Use 3 daily or as directed. (Patient not taking: Reported on 1/12/2018) 100 each prn       ROS:  Constitutional: Denies fever, chills, or diaphoresis.  No weight gain/loss.   ENT: Denies visual disturbance, hearing loss, epistaxis, vertigo,   Allergies/Immunologic: Negative for seasonal allergies, anemia, or bleeding disorder.   Respiratory:  See HPI  Cardiovascular: As per HPI.   GI: Denies nausea, vomiting, diarrhea, hematemesis, melena, or hematochezia.   : Denies urinary frequency, dysuria, or hematuria.   Integument: Negative for bruising, rash, or pruritis.  Psychiatric: Negative for anxiety, depression, sleep disturbance, or mood changes.   Neuro: Negative for headaches, syncope, numbness or tingling.   Endocrinology: +DM   Musculoskeletal: Negative for gout, joint stiffness, swelling, or muscle weakness    EXAM:  BP (!) 141/93 (BP Location: Left arm, Patient Position: Chair, Cuff Size: Adult Regular)  Pulse 98  Ht 1.575 m (5' 2\")  Wt 55.2 kg (121 lb 11.2 oz)  SpO2 99%  BMI 22.26 kg/m2  General: alert, " articulate, and in no acute distress.  HEENT: normocephalic, atraumatic, anicteric sclera, EOMI, normal palate, mucosa moist, dentition intact, no cyanosis.    Neck: neck supple.  No adenopathy, masses, or carotid bruits.  JVP just above the clavicle sitting upright  Heart: regular rhythm, normal S1/S2, no murmur, gallop, rub.  Precordium quiet with normal PMI.     Lungs: clear, no rales, ronchi, or wheezing.  No accessory muscle use, respirations unlabored.   Abdomen: soft, non-tender, bowel sounds present, no hepatomegaly  Extremities: no clubbing, cyanosis or edema.  2+ pedal pulses.   Neurological: alert and oriented x 3.  normal speech and affect, no gross motor deficits  Skin:  No ecchymoses, rashes, or clubbing.    Labs:  CBC RESULTS:  Lab Results   Component Value Date    WBC 11.2 (H) 10/27/2017    RBC 4.48 10/27/2017    HGB 13.5 10/27/2017    HCT 42.2 10/27/2017    MCV 94 10/27/2017    MCH 30.1 10/27/2017    MCHC 32.0 10/27/2017    RDW 13.0 10/27/2017     10/27/2017       CMP RESULTS:  Lab Results   Component Value Date     01/12/2018    POTASSIUM 3.7 01/12/2018    CHLORIDE 105 01/12/2018    CO2 28 01/12/2018    ANIONGAP 6 01/12/2018     (H) 01/12/2018    BUN 19 01/12/2018    CR 1.04 01/12/2018    GFRESTIMATED 75 01/12/2018    GFRESTBLACK >90 01/12/2018    ROB 9.0 01/12/2018    BILITOTAL 0.6 09/26/2017    ALBUMIN 4.1 09/26/2017    ALKPHOS 63 09/26/2017    ALT 19 09/26/2017    AST 15 09/26/2017        INR RESULTS:  Lab Results   Component Value Date    INR 2.3 (A) 12/26/2017    INR 2.36 (H) 11/03/2017       No components found for: CK  Lab Results   Component Value Date    MAG 2.4 (H) 06/23/2017     Lab Results   Component Value Date    NTBNP 1599 (H) 09/11/2017       Most recent echocardiogram:  Recent Results (from the past 8760 hour(s))   Echocardiogram Limited    Narrative    [id number]  [facility]  [id number]  [id number]^MEGHAN^IVA^AMBER           [facility]  [facility]-3rd Floor  [address] [place] [address]     Name: SYDNIE SIERRA  MRN: [id number]  : 1965  Study Date: 12/15/2017 10:52 AM  Age: 52 yrs  Gender: Male  [place] [facility]  Reason For Study: s/p Karena Clip  Ordering Physician: IAV CHRISTIANSON  Referring Physician: IVA CHRISTIANSON  Performed By: Sandra Fields RDCS     BSA: 1.6 m2  Height: 62 in  Weight: 123 lb  BP: 100/74 mmHg  _____________________________________________________________________________  __        Procedure  Limited Echocardiogram with portions of two-dimensional, color and spectral  Doppler performed.  _____________________________________________________________________________  __        Interpretation Summary  Moderately reduced systolic function. EF 30-35%. Global hypokinesis with  akinesis of the basal-mid inferior and basal-mid inferolateral walls.  Global right ventricular function is mildly reduced.  Status post transcatheter mitral valve repair with Mitraclip. The clip is  attached to the leaflets. There is trace to mild mitral regurgitation. Mean  gradient 5 mmHg at heart rate 97 bpm.  Pulmonary hypertension with right ventricular systolic pressure 41mmHg above  the right atrial pressure present.  No pericardial effusion.     Compared to the prior study 8/10/17 there has been no significant change.  _____________________________________________________________________________  __        Left Ventricle  Left ventricular wall thickness is normal. Mild left ventricular dilation is  present. Global hypokinesis with akinesis of the basal-mid inferior and basal-  mid inferolateral walls. Moderately reduced systolic function. EF 30-35%.  Diastolic function not assessed.     Right Ventricle  The right ventricle is normal size. Global right ventricular function is  mildly reduced. A pacemaker lead is noted in the right ventricle.     Atria  Both atria appear normal.     Mitral  Valve  Status post transcatheter mitral valve repair with Mitraclip. The clip is  attached to the leaflets. There is trace to mild mitral regurgitation. Mean  gradient 5 mmHg at heart rate 97 bpm.        Aortic Valve  Aortic valve is normal in structure and function.     Tricuspid Valve  Mild tricuspid insufficiency is present. Right ventricular systolic pressure  is 41mmHg above the right atrial pressure.     Pulmonic Valve  The pulmonic valve is normal.     Vessels  The aorta root is normal. The inferior vena cava was normal in size with  preserved respiratory variability. Estimated mean right atrial pressure is 3  mmHg.     Pericardium  No pericardial effusion is present.        Attestation  I have personally viewed the imaging and agree with the interpretation and  report as documented by the fellow, David Hill, and/or edited by me.  _____________________________________________________________________________  __  MMode/2D Measurements & Calculations     IVSd: 0.72 cm  LVIDd: 4.9 cm  LVIDs: 4.2 cm  LVPWd: 0.73 cm  FS: 14.2 %  EDV(Teich): 115.5 ml  ESV(Teich): 80.6 ml  LV mass(C)d: 118.2 grams  LV mass(C)dI: 76.1 grams/m2     EF(MOD-bp): 34.1 %  LA Volume (BP): 49.5 ml  LA Volume Index (BP): 31.9 ml/m2  RWT: 0.30           Doppler Measurements & Calculations  MV E max isabel: 126.4 cm/sec  MV A max isabel: 46.5 cm/sec  MV E/A: 2.7  MV max P.7 mmHg  MV mean P.6 mmHg  MV V2 VTI: 26.5 cm  MV dec time: 0.13 sec  TR max isbael: 321.2 cm/sec  TR max P.3 mmHg     _____________________________________________________________________________  __           Report approved by: Isabel Alex 12/15/2017 12:24 PM      ECHO COMPLETE WITH OPTISON    Narrative    [id number]  [id number]  [id number]  [id number]^CHRISTIANSON^IVA^AMBER           [facility]  Diagnostic and Treamtent-3rd Floor  [address] [place] [address]     Name: SYDNEI SIERRA  MRN: [id number]  :  1965  Study Date: 08/10/2017 09:02 AM  Age: 52 yrs  Gender: Male  Patient Location: [facility]  Reason For Study: Nonrheumatic mitral (valve) insufficiency  Ordering Physician: IVA CHRISTIANSON  Referring Physician: IVA CHRISTIANSON  Performed By: True Newman DARRIAN     BSA: 1.5 m2  Height: 62 in  Weight: 118 lb  _____________________________________________________________________________  __        Procedure  Echocardiogram with two-dimensional, color and spectral Doppler performed.  _____________________________________________________________________________  __        Interpretation Summary  Moderately (EF 30-35%) reduced left ventricular function is present (bi-plane  32%). Basal lateral, inferior, and inferoseptal hypokinesis.  Global RV function mildly reduced.  S/p trans-catheter MVR 6/2017. Mild mitral insufficiency is present. Mean  valve gradient 5 mmHg at a heart rate of 104 bpm.  Right ventricular systolic pressure is 40mmHg above the right atrial pressure.  The inferior vena cava is normal in size with preserved respiratory  variability.  No pericardial effusion is present.  _____________________________________________________________________________  __        Left Ventricle  Left ventricular wall thickness is normal. Left ventricular size is normal.  Diastolic function cannot be assessedBiplane traced at 32%. Moderately (EF 35-  40%) reduced left ventricular function is present. Basal lateral and inferior,  basal and mid inferoseptal, basal inferior hypokinesis.     Right Ventricle  The right ventricle is normal size. Global right ventricular function is  mildly reduced.     Atria  The right atria appears normal. Mild left atrial enlargement is present. The  atrial septum is intact as assessed by color Doppler .        Mitral Valve  S/p mitral valve repair. Mean gradient across the valve is 5 mmHg. Mild mitral  annular calcification is present. Mild mitral insufficiency is present. The  mean  mitral valve gradient is 4.6 mmHg.     Aortic Valve  The aortic valve is tricuspid. Trace aortic insufficiency is present.     Tricuspid Valve  Mild tricuspid insufficiency is present. Right ventricular systolic pressure  is 40mmHg above the right atrial pressure.     Pulmonic Valve  The pulmonic valve is normal. Trace pulmonic insufficiency is present.     Vessels  The aorta root is normal. The inferior vena cava was normal in size with  preserved respiratory variability. Estimated mean right atrial pressure is 3  mmHg.     Pericardium  No pericardial effusion is present.        Compared to Previous Study  This study was compared with the study from 2017 . The RVSP is marginally  higher.     Attestation  I have personally viewed the imaging and agree with the interpretation and  report as documented by the fellow, Dmitriy Merino, and/or edited by me.  _____________________________________________________________________________  __     MMode/2D Measurements & Calculations  IVSd: 0.73 cm  LVIDd: 5.3 cm  LVIDs: 4.5 cm  LVPWd: 0.46 cm  FS: 14.0 %  EDV(Teich): 134.6 ml  ESV(Teich): 94.8 ml  LV mass(C)d: 103.8 grams  LV mass(C)dI: 67.9 grams/m2  Ao root diam: 2.6 cm  asc Aorta Diam: 3.1 cm  LVOT diam: 1.8 cm  LVOT area: 2.6 cm2        Doppler Measurements & Calculations  MV max P.3 mmHg  MV mean P.6 mmHg  MV V2 VTI: 23.7 cm  TR max isabel: 317.6 cm/sec  TR max P.4 mmHg        _____________________________________________________________________________  __        Report approved by: Isabel Daniel 08/10/2017 03:02 PM      Echocardiogram Limited    Narrative    [id number]  [facility]  [id number]  [id number]^MEGHAN^IVA^AMBER           [facility]  Diagnostic and Treamtent-3rd Floor  [address] [place] [address]     Name: SYDNIE SIERRA  MRN: [id number]  : 1965  Study Date: 2017 08:34 AM  Age: 51 yrs  Gender: Male  Patient Location: [facility]  Reason  For Study: S/P MV clip  History: S/P MV clip  Ordering Physician: IVA CHRISTIANSON  Referring Physician: IVA CHRISTIANSON  Performed By: Lila Miner RDCS     BSA: 1.5 m2  Height: 61 in  Weight: 120 lb  BP: 100/60 mmHg  _____________________________________________________________________________  __        Procedure  Limited Echocardiogram with portions of two-dimensional, color and spectral  Doppler performed.  _____________________________________________________________________________  __        Interpretation Summary  Moderately (EF 30-35%) reduced left ventricular function is present. Inferior  wall akinesis. Basal and mid inferoseptal and inferolateral hypokinesis.  Global right ventricular function is mildly reduced.  S/p trans-catheter [id number] mitral valve repair (TMVR). Mean gradient of  3.4 mmHg at a HR of 96 bpm. Trace to mild mitral insufficiency is present.  The inferior vena cava was normal in size with preserved respiratory  variability.  No pericardial effusion is present.  Compared to study from 5/2/17 there is no significant change.  _____________________________________________________________________________  __        Left Ventricle  Left ventricular wall thickness is normal. Left ventricular size is normal.  Moderately (EF 30-35%) reduced left ventricular function is present. Left  ventricular diastolic function is indeterminate. Inferior wall akinesis. Basal  and mid inferoseptal and inferolateral hypokinesis.     Right Ventricle  The right ventricle is normal size. Global right ventricular function is  mildly reduced.     Atria  Both atria appear normal. The atrial septum is intact as assessed by color  Doppler .     Mitral Valve  Trace to mild mitral insufficiency is present. S/p trans-catheter [id number]  mitral valve repair (TMVR). Mean gradient of 3.4 mmHg at a HR of 96 bpm.        Aortic Valve  Aortic valve is normal in structure and function. The aortic valve is  tricuspid.      Tricuspid Valve  The tricuspid valve is normal. Trace tricuspid insufficiency is present. Right  ventricular systolic pressure is 29mmHg above the right atrial pressure.  Pulmonary artery systolic pressure is normal.     Pulmonic Valve  The pulmonic valve cannot be assessed.     Vessels  The inferior vena cava was normal in size with preserved respiratory  variability. The aorta root cannot be assessed. The pulmonary artery cannot be  assessed. Normal pulmonary arteries.     Pericardium  No pericardial effusion is present.        Compared to Previous Study  Compared to study from 17 there is no significant change.     Attestation  I have personally viewed the imaging and agree with the interpretation and  report as documented by the fellow, Dr. Josue Ibarra, and/or edited by me.  _____________________________________________________________________________  __     MMode/2D Measurements & Calculations  LA Volume (BP): 32.7 ml  LA Volume Index (BP): 21.5 ml/m2        Doppler Measurements & Calculations  MV E max isabel: 136.7 cm/sec  MV A max isabel: 95.2 cm/sec  MV E/A: 1.4  MV max P.1 mmHg  MV mean PG: 3.4 mmHg  MV V2 VTI: 30.7 cm  MV P1/2t max isabel: 144.2 cm/sec  MV P1/2t: 50.5 msec  MVA(P1/2t): 4.4 cm2  MV dec slope: 836.5 cm/sec2  MV dec time: 0.19 sec     Ao V2 max: 131.5 cm/sec  Ao max P.0 mmHg  Ao V2 mean: 91.3 cm/sec  Ao mean PG: 3.8 mmHg  Ao V2 VTI: 19.3 cm  LV V1 max PG: 3.2 mmHg  LV V1 max: 89.1 cm/sec  LV V1 VTI: 13.4 cm  TR max isabel: 267.1 cm/sec  TR max P.5 mmHg  Lateral E/e': 27.7  Medial E/e': 35.3     _____________________________________________________________________________  __           Report approved by: Isabel Love 2017 10:17 AM      Echocardiogram    Narrative    [id number]  [id number]  [id number]  [id number]^AQUILES^DESMOND^           [facility]  Echocardiography Laboratory  [address] [place] [address]     Name: SYDNIE SIERRA  MRN:  [id number]  : 1965  Study Date: 2017 12:03 PM  Age: 51 yrs  Gender: Male  Patient Location: [facility]  Reason For Study: Mitral Valve Repair  Ordering Physician: DESMOND KWAN  Referring Physician: CHELE ASIF  Performed By: Bryant Reed RDCS     BSA: 1.6 m2  Height: 62 in  Weight: 130 lb  BP: 111/65 mmHg  _____________________________________________________________________________  __        Procedure  Complete Portable Echo Adult.  _____________________________________________________________________________  __        Interpretation Summary  S/p trans-catheter [id number]-wa-[id number] mitral valve repair (TMVR). There is less than  mild residual mitral regurgitation. The mean gradient is 4.7 mmHg at a heart  rate in excess of 115 bpm.  Dilation of the inferior vena cava is present with normal respiratory  variation in diameter. Estimated mean right atrial pressure is 3-8 mmHg.  No pericardial effusion is present.     _____________________________________________________________________________  __        Left Ventricle  Left ventricular size is normal. Left ventricular wall thickness is normal.  The Ejection Fraction is estimated at 30-35%. Diastolic function not assessed  due to tachycardia. Basal inferolateral hypokinesis.     Right Ventricle  The right ventricle is normal size. Global right ventricular function is  normal.     Atria  The right atria appears normal. Mild left atrial enlargement is present.     Mitral Valve  S/p trans-catheter [id number]-[id number] mitral valve repair (TMVR). There is less than  mild residual mitral regurgitation. The mean gradient is 4.7 mmHg at a heart  rate in excess of 115 bpm.        Aortic Valve  Trace aortic insufficiency is present.     Tricuspid Valve  Mild tricuspid insufficiency is present. Mild pulmonary hypertension is  present. Right ventricular systolic pressure is 32mmHg above the right atrial  pressure.     Pulmonic Valve  The pulmonic valve cannot be  assessed.     Vessels  Dilation of the inferior vena cava is present with normal respiratory  variation in diameter. Estimated mean right atrial pressure is 3-8 mmHg.     Pericardium  No pericardial effusion is present.     _____________________________________________________________________________  __  MMode/2D Measurements & Calculations  LA Volume (BP): 53.6 ml           Doppler Measurements & Calculations  MV max PG: 10.1 mmHg  MV mean P.7 mmHg  MV V2 VTI: 22.3 cm  TV max P.9 mmHg  TR max isabel: 282.3 cm/sec  TR max P.9 mmHg           _____________________________________________________________________________  __           Report approved by: Isabel Daniel 2017 01:21 PM      ECHO LIMITED WITH OPTISON    Narrative    [id number]  [id number]  [id number]  [id number]^ESTELA^CORTES^MCKENZIE           [facility]  Echocardiography Laboratory  [address] [place] [address]     Name: SYDNIE SIERRA  MRN: [id number]  : 1965  Study Date: 2017 04:42 PM  Age: 51 yrs  Gender: Male  Patient Location: [facility]  Reason For Study: Mitral Regurgitation  Ordering Physician: CORTES PALMER  Referring Physician: CHELE ASIF  Performed By: Emily Gutierrez RDCS     BSA: 1.6 m2  Height: 62 in  Weight: 134 lb  BP: 137/74 mmHg  _____________________________________________________________________________  __        Procedure  Limited Portable Echo Adult. Contrast Optison. Optison (NDC #7132-8522-72)  given intravenously. Patient was given 5 ml mixture of 3 ml Optison and 6 ml  saline. 4 ml wasted.  _____________________________________________________________________________  __        Interpretation Summary  The Ejection Fraction is estimated at 30-35% (calculated, 36%) at a heart rate  in excess of 130 bpm.  Severe mitral insufficiency is present.  _____________________________________________________________________________  __        Left Ventricle  Left ventricular  size is normal. Left ventricular wall thickness is normal.  The Ejection Fraction is estimated at 30-35%. Basal lateral, inferior  akinesis.     Right Ventricle  The right ventricle is normal size. Global right ventricular function is  normal.     Mitral Valve  Severe mitral insufficiency is present.     Tricuspid Valve  Mild tricuspid insufficiency is present. Right ventricular systolic pressure  is 32mmHg above the right atrial pressure.        Pericardium  No pericardial effusion is present.  _____________________________________________________________________________  __     MMode/2D Measurements & Calculations  IVSd: 0.73 cm  LVIDd: 4.9 cm  LVIDs: 4.1 cm  LVPWd: 0.88 cm  FS: 17.8 %  EDV(Teich): 115.0 ml  ESV(Teich): 72.5 ml  LV mass(C)d: 134.7 grams        Doppler Measurements & Calculations  MR ERO: 0.26 cm2  MR volume: 20.4 ml  TV max P.7 mmHg  TR max isabel: 281.6 cm/sec  TR max P.7 mmHg              _____________________________________________________________________________  __        Report approved by: Isabel Daniel 2017 06:42 PM      Echo Complete Bubble    Narrative    [id number]  [id number]  [id number]  [id number]^SUZANNE^CHRISTIANO^J           [facility] [place]  Echocardiography Laboratory  [address] [place] [id number]     Name: SYDNIE SIERRA  MRN: [id number]  : 1965  Study Date: 2017 11:53 AM  Age: 51 yrs  Gender: Male  Patient Location: [facility]  Reason For Study: Pre LVAD  Ordering Physician: CHRISTIANO PALACIOS  Referring Physician: CHELE ASIF  Performed By: Yung Garsia RDCS     BSA: 1.6 m2  Height: 62 in  Weight: 136 lb  HR: 108  BP: 100/66 mmHg  _____________________________________________________________________________  __        Procedure  Bubble Echocardiogram with two-dimensional, color and spectral Doppler  performed.  _____________________________________________________________________________  __        Interpretation  Summary  Moderately (EF 38%) reduced left ventricular function is present.  Global right ventricular function is normal.  Mild to moderate tricuspid and mitral insufficiency is present.  The atrial septum is intact as assessed by color Doppler and agitated saline  bubble study .  Estimated pulmonary artery systolic pressure is 38 mmHg plus right atrial  pressure.  The inferior vena cava was dilated at 2.1 cm without respiratory variability,  consistent with increased right atrial pressure.  No pericardial effusion is present.  _____________________________________________________________________________  __        Left Ventricle  Left ventricular size is normal. :LVIDd/s = 4.6/4.0 cm. Moderately (EF 35-40%)  reduced left ventricular function is present.     Right Ventricle  The right ventricle is normal size. Global right ventricular function is  normal. TAPSE 1.4 cm, S' 10 cm/s. A right heart catheter is noted in the right  ventricle.     Atria  Both atria appear normal. The atrial septum is intact as assessed by color  Doppler and agitated saline bubble study .     Mitral Valve  Mild mitral annular calcification is present. Mild to moderate mitral  insufficiency is present.        Aortic Valve  Mild aortic insufficiency is present.     Tricuspid Valve  Moderate tricuspid insufficiency is present. Estimated pulmonary artery  systolic pressure is 38 mmHg plus right atrial pressure.     Pulmonic Valve  The pulmonic valve cannot be assessed.     Vessels  The inferior vena cava was dilated at 2.1 cm without respiratory variability,  consistent with increased right atrial pressure.     Pericardium  No pericardial effusion is present.        Compared to Previous Study  This study was compared with the study from 4.5.17 . There has been no change.  _____________________________________________________________________________  __  MMode/2D Measurements & Calculations  IVSd: 1.0 cm     LVIDd: 4.6 cm  LVIDs: 4.0 cm  LVPWd:  1.1 cm  FS: 13.1 %  EDV(Teich): 99.3 ml  ESV(Teich): 71.3 ml  LV mass(C)d: 175.6 grams  Ao root diam: 3.0 cm  asc Aorta Diam: 3.0 cm  RVOT diam: 1.8 cm  LA Volume (BP): 40.1 ml  LA Volume Index (BP): 24.8 ml/m2           Doppler Measurements & Calculations  MV E max isabel: 111.0 cm/sec  MV A max isabel: 61.6 cm/sec  MV E/A: 1.8  MV dec time: 0.14 sec  PA acc time: 0.07 sec  TR max isabel: 307.0 cm/sec  TR max P.7 mmHg  Lateral E/e': 13.2  Medial E/e': 18.9           _____________________________________________________________________________  __           Report approved by: Isabel Alex 2017 02:49 PM      Echo limited (Adults Only)    Narrative    [id number]  ECH11  [id number]  [id number]^MAIRA^DIVINE^CHECO           [facility],[place]  Echocardiography Laboratory  [address] [place] [id number]     Name: SYDNIE SIERRA  MRN: [id number]  : 1965  Study Date: 2017 01:48 PM  Age: 51 yrs  Gender: Male  Patient Location: [facility]  Reason For Study: CAD, Eval MR. EF  Ordering Physician: DIVINE RAO  Referring Physician: CHELE ASIF  Performed By: Yung Garsia RDCS     BSA: 1.6 m2  Height: 62 in  Weight: 138 lb  HR: 111  BP: 82/47 mmHg  _____________________________________________________________________________  __        Procedure  Limited Portable Echo Adult.  _____________________________________________________________________________  __        Interpretation Summary  Moderately (EF 35-40%) reduced left ventricular function is present. Traced at  40%.  Moderate mitral insufficiency is present.  The cause of the mitral insufficiency appears to be restricted posterior  leaflet from posterior MI. No mitral leaflet pathology seen.  Dilation of the inferior vena cava is present with abnormal respiratory  variation in diameter.     No change from last study.  _____________________________________________________________________________  __        Left  Ventricle  Left ventricular size is normal. Moderately (EF 35-40%) reduced left  ventricular function is present. Posterior wall akinesis is present. Inferior  wall akinesis is present. Lateral wall akinesis is present.     Right Ventricle  The right ventricle is normal size. Global right ventricular function is  normal. A right heart catheter is noted in the right ventricle.     Mitral Valve  Moderate mitral insufficiency is present. The cause of the mitral  insufficiency appears to be restricted posterior leaflet.     Tricuspid Valve  Trace tricuspid insufficiency is present.        Vessels  Dilation of the inferior vena cava is present with abnormal respiratory  variation in diameter.     Pericardium  No pericardial effusion is present.  _____________________________________________________________________________  __                                Report approved by: Isabel Yepez 2017 03:04 PM                    _____________________________________________________________________________  __      ECHO COMPLETE WITH OPTISON    Narrative    [id number]  ECH73  [id number]  [id number]^MAIRA^DIVINE^CHECO           [facility]  Echocardiography Laboratory  [address] [place] [address]     Name: SYDNIE SIERRA  MRN: [id number]  : 1965  Study Date: 2017 07:21 PM  Age: 51 yrs  Gender: Male  Patient Location: [facility]  Reason For Study: Heart Failure. Comments: pls assess LV and RV function and  mitral regurg/papillary muscle rupture?  Ordering Physician: DIVINE RAO  Referring Physician: CHELE ASIF  Performed By: Tony Mallory RDCS     BSA: 1.6 m2  Height: 62 in  Weight: 135 lb  HR: 137  BP: 98/56 mmHg  _____________________________________________________________________________  __        Procedure  Complete Portable Echo Adult. Contrast Optison. Optison (NDC #6685-0994-62)  given intravenously. Patient was given 3.5 ml mixture of 3 ml  Optison and 6 ml  saline. 5.5 ml wasted.  _____________________________________________________________________________  __        Interpretation Summary  Left ventricular size is normal.  The Ejection Fraction is estimated at 35-40%.  Inferior wall akinesis is present.  Lateral wall akinesis is present.  Posterior wall akinesis is present.  Right ventricular function, chamber size, wall motion, and thickness are  normal.  Moderate mitral insufficiency is present.  Moderate tricuspid insufficiency is present.  Right ventricular systolic pressure is 47mmHg above the right atrial pressure.  The inferior vena cava is normal.  _____________________________________________________________________________  __        Left Ventricle  Left ventricular size is normal. Left ventricular wall thickness is normal.  The Ejection Fraction is estimated at 35-40%. Grade III or advanced diastolic  dysfunction. Inferior wall akinesis is present. Lateral wall akinesis is  present. Posterior wall akinesis is present.     Right Ventricle  Right ventricular function, chamber size, wall motion, and thickness are  normal.     Atria  Both atria appear normal.     Mitral Valve  Moderate mitral insufficiency is present.        Aortic Valve  Aortic valve is normal in structure and function.     Tricuspid Valve  Moderate tricuspid insufficiency is present. Right ventricular systolic  pressure is 47mmHg above the right atrial pressure.     Pulmonic Valve  The pulmonic valve is normal. Trace pulmonic insufficiency is present.     Vessels  The aorta root is normal. The pulmonary artery is normal. The inferior vena  cava is normal.     Compared to Previous Study  Previous study not available for comparison.     _____________________________________________________________________________  __  MMode/2D Measurements & Calculations  LA Volume (BP): 49.0 ml     LA Volume Index (BP): 30.2 ml/m2        Doppler Measurements & Calculations  MV E max isabel:  119.4 cm/sec  MV A max frank: 56.8 cm/sec  MV E/A: 2.1  MV dec time: 0.08 sec  Ao V2 max: 141.6 cm/sec  Ao max P.0 mmHg  PA V2 max: 104.7 cm/sec  PA max P.4 mmHg  TR max frank: 329.6 cm/sec  TR max P.5 mmHg  Pulm Sys Frank: 30.5 cm/sec  Pulm Armando Frank: 52.8 cm/sec  Pulm S/D: 0.58  Lateral E/e': 17.5  Medial E/e': 15.3              _____________________________________________________________________________  __        Report approved by: Isabel Love 2017 10:11 PM      ECHO LIMITED WITH OPTISON    Narrative    [id number]  ECH74  [id number]  [id number]^LEONARD ECKERT^RODOLFO^EMBER           [facility],[place]  Echocardiography Laboratory  [address] [place] [address]     Name: SYDNIE SIERRA  MRN: [id number]  : 1965  Study Date: 2017 11:40 AM  Age: 51 yrs  Gender: Male  Patient Location: [facility]  Reason For Study: CAD- Assess for WMA  History: CAD- Assess for WMA  Ordering Physician: RODOLFO BROWNE  Referring Physician: CHELE ASIF  Performed By: Lila Miner RDCS     BSA: 1.7 m2  Height: 62 in  Weight: 144 lb  BP: 115/57 mmHg  _____________________________________________________________________________  __        Procedure  Limited Portable Echo Adult. Contrast Optison. Optison (NDC #9117-7126-64)  given intravenously. Patient was given 6 ml mixture of 3 ml Optison and 6 ml  saline. 3 ml wasted.  _____________________________________________________________________________  __        Interpretation Summary  Inferior,posterior,lateral,basal septal wall akinesis as reported before. No  change.  _____________________________________________________________________________  __        Left Ventricle  The Ejection Fraction is estimated at 50-55%. Inferior,posterior,lateral,basal  septal wall akinesis as reported before. No change.  _____________________________________________________________________________  __           Doppler Measurements &  Calculations  MV E max isabel: 117.7 cm/sec  MV A max isabel: 62.2 cm/sec  MV E/A: 1.9  MV dec time: 0.09 sec  Lateral E/e': 16.1  Medial E/e': 18.0              _____________________________________________________________________________  __        Report approved by: Isabel Love 2017 12:20 PM      ECHO LIMITED WITH OPTISON    Narrative    [id number]  ECH74  [id number]  [id number]^LEONARD ECKERT^RODOLFO^EMBER           [facility],[place]  Echocardiography Laboratory  [address] [place] [address]     Name: SYDNIE SIERRA  MRN: [id number]  : 1965  Study Date: 2017 08:54 AM  Age: 51 yrs  Gender: Male  Patient Location: [facility]  Reason For Study: CHF  Ordering Physician: RODOLFO BROWNE  Referring Physician: CHELE ASIF  Performed By: Emily Gutierrez RDCS     BSA: 1.6 m2  Height: 62 in  Weight: 139 lb  BP: 124/97 mmHg  _____________________________________________________________________________  __        Procedure  Limited Portable Echo Adult. Contrast Optison. Optison (NDC #0831-8074-55)  given intravenously. Patient was given 5 ml mixture of 3 ml Optison and 6 ml  saline. 4 ml wasted.  _____________________________________________________________________________  __        Interpretation Summary  Limited study. Ischemic CM. Moderately (EF 30-35%) reduced left ventricular  function is present. Traced at 32%.  Posterior wall akinesis is present. Lateral wall akinesis is present. Inferior  wall akinesis is present.  Right ventricular function, chamber size, wall motion, and thickness are  normal.  Dilation of the inferior vena cava is present with abnormal respiratory  variation in diameter.     Compared to prior study, RV fxn is improved.  _____________________________________________________________________________  __        Left Ventricle  Left ventricular size is normal. Mild concentric wall thickening consistent  with left ventricular hypertrophy is present.  Moderately (EF 30-35%) reduced  left ventricular function is present. Posterior wall akinesis is present.  Lateral wall akinesis is present. Inferior wall akinesis is present.     Right Ventricle  Right ventricular function, chamber size, wall motion, and thickness are  normal.     Mitral Valve  Mild to moderate mitral insufficiency is present.     Aortic Valve  The aortic valve is tricuspid. Trace aortic insufficiency is present.        Tricuspid Valve  Trace to mild tricuspid insufficiency is present. The right ventricular  systolic pressure is approximated at 27.8 mmHg plus the right atrial pressure.     Vessels  The aorta root is normal. Dilation of the inferior vena cava is present with  abnormal respiratory variation in diameter.     Pericardium  No pericardial effusion is present.  _____________________________________________________________________________  __     MMode/2D Measurements & Calculations  IVSd: 1.1 cm  LVIDd: 4.4 cm  LVIDs: 3.8 cm  LVPWd: 1.4 cm  FS: 12.6 %  EDV(Teich): 86.7 ml  ESV(Teich): 63.0 ml  LV mass(C)d: 201.6 grams  TAPSE: 1.2 cm        Doppler Measurements & Calculations  MR ERO: 0.15 cm2  MR volume: 13.4 ml  TV max P.8 mmHg     TR max isabel: 263.8 cm/sec  TR max P.8 mmHg           _____________________________________________________________________________  __           Report approved by: Isabel Yepez 2017 10:04 AM      Echocardiogram Limited    Narrative    [id number]  ECH11  [id number]  [id number]^MARCH^SHAMANE^JAZMYNE           [facility]  Echocardiography Laboratory  [address] [place] [address]        Name: RIGOSYDNIE DEBBI  MRN: [id number]  : 1965  Study Date: 2017 12:46 PM  Age: 51 yrs  Gender: Male  Patient Location: [facility]  Reason For Study: Murmur, MI - Acute  Ordering Physician: CHELE ASIF  Referring Physician: PABLO PMD  Performed By: Flo Shoemaker RDCS     BSA: 1.6 m2  Height: 62 in  Weight: 136 lb  HR: 99  BP:  134/62 mmHg  _____________________________________________________________________________  __        Procedure  Limited Portable Echo Adult.  _____________________________________________________________________________  __        Interpretation Summary     The visual ejection fraction is estimated at 30-35%.  There is extensive inferior, inferoseptal, and inferolateral wall akinesis.  Basal/mid lateral wall hypokinesis  There is moderate to severe septal hypokinesis.  Septal wall motion abnormality may reflect pacemaker activation.  There is a catheter/pacemaker lead seen in the right ventricle.  The right ventricle is mildly dilated.  Severely decreased right ventricular systolic function  There is no pericardial effusion.  The rhythm was paced.  Compared to the prior study, the LVEF appears somewhat decreased. Septal wall  motion abnormality larger- may reflect, in part, that patient in sinus tach on  prior study, and ventricular paced now. No hemodynamically significant  valvular abnormalities on 2D or color flow imaging.  _____________________________________________________________________________  __        Left Ventricle  The left ventricle is normal in size. Left ventricular hypertrophy is noted by  two-dimensional echocardiography. The visual ejection fraction is estimated at  30-35%. There is inferior wall akinesis. There is moderate to severe septal  hypokinesis. Septal wall motion abnormality may reflect pacemaker activation.     Right Ventricle  There is a catheter/pacemaker lead seen in the right ventricle. The right  ventricle is mildly dilated. Severely decreased right ventricular systolic  function.     Atria  Normal left atrial size. Right atrial size is normal.     Mitral Valve  The mitral valve is normal in structure and function. There is trace mitral  regurgitation.        Tricuspid Valve  The tricuspid valve is not well visualized, but is grossly normal. There is  trace tricuspid  regurgitation. Right ventricular systolic pressure could not  be approximated due to inadequate tricuspid regurgitation.     Aortic Valve  The aortic valve is trileaflet. There is trace aortic regurgitation. No aortic  stenosis is present.     Pulmonic Valve  The pulmonic valve is not well visualized. There is no pulmonic valvular  regurgitation.     Vessels  The aortic root is normal size. Normal size ascending aorta. Inferior vena  cava not well visualized for estimation of right atrial pressure.     Pericardium  There is no pericardial effusion.        Rhythm  The rhythm was paced.  _____________________________________________________________________________  __                                Report approved by: Isabel Whitney 2017 02:17 PM                    _____________________________________________________________________________  __      Echocardiogram Limited    Narrative    [id number]  [facility]  [id number]  [id number]^LULU^KATHLEEN^EVANGELINA           [facility]  Echocardiography Laboratory  [address] [place] [address]        Name: SYDNIE SIERRA  MRN: [id number]  : 1965  Study Date: 2017 07:22 PM  Age: 51 yrs  Gender: Male  [place] [facility] [place] Study: MI - Acute  History: acute MI  Ordering Physician: KATHLEEN LAWSON  Performed By: Jitendra Davis RDCS     BSA: 1.7 m2  Height: 62 in  Weight: 151 lb  _____________________________________________________________________________  __        Procedure  Limited Portable Echo Adult. (Emergent exam, abbreviated study performed).  _____________________________________________________________________________  __        Interpretation Summary     The visual ejection fraction is estimated at 40%.  There is inferior, ineroseptal & inferolateral wall akinesis, entire walls.  The left ventricular cavity is small.  The right ventricle is mild to moderately dilated.  The right ventricular systolic function is  severely reduced.  There is no pericardial effusion.  There is mild to moderate (1-2+) tricuspid regurgitation.  Limited views were obtained.  _____________________________________________________________________________  __        Left Ventricle  The left ventricle is normal in size. The left ventricular cavity is small.  Left ventricular hypertrophy is noted by two-dimensional echocardiography. The  visual ejection fraction is estimated at 40%. There is inferior &  inferolateral wall akinesis.     Right Ventricle  The right ventricle is mild to moderately dilated. The right ventricular  systolic function is severely reduced.     Atria  Normal left atrial size. The right atrium is mildly dilated.        Mitral Valve  The mitral valve is normal in structure and function. There is mild (1+)  mitral regurgitation.     Tricuspid Valve  The tricuspid valve is normal in structure and function. There is mild to  moderate (1-2+) tricuspid regurgitation.     Aortic Valve  The aortic valve is normal in structure and function.     Pulmonic Valve  The pulmonic valve is not well visualized. There is no pulmonic valvular  regurgitation.     Pericardium  There is no pericardial effusion.        Rhythm  Indeterminate rhtym (no EKG trace, no MV continuous wave Doppler).  _____________________________________________________________________________  __  MMode/2D Measurements & Calculations  IVSd: 1.1 cm     LVIDd: 4.2 cm  LVIDs: 3.1 cm  LVPWd: 1.2 cm  FS: 27.3 %  EDV(Teich): 78.5 ml  ESV(Teich): 36.4 ml  LV mass(C)d: 165.8 grams  Ao root diam: 2.9 cm  LA dimension: 3.5 cm  LA/Ao: 1.2                 _____________________________________________________________________________  __           Report approved by: Isabel Whitney 03/27/2017 08:27 AM            Assessment and Plan:    In summary, Mr. Henao is a 52 year old gentleman with ICM who appears euvolemic and is doing well from a cardiac perspective.  I have increased his Toprol  XL to 50 mg daily and decreased his digoxin to 0.125 mg four times a week as his dig level was 1.0 today.  I am unable to titrate ACE or add spironolactone as he had transient renal dysfunction with prior attempts. Since his ultrasound was negative for DVT and he has been treated for the recommended three months, will d/c warfarin at this time.      1.  Chronic a heart failure secondary to ICM.  Stage C  NYHA Class II  ACEi/ARB: contraindicated due to transient renal dysfunction  BB: titration ongoing.  Increase to Metoprolol XL 50 mg daily.  Aldosterone antagonist: contraindicated due to transient renal dysfunction with prior attempts  SCD prophylaxis: ICD  Fluid status: euvolemic  Anticoagulation: Warfarin.  Discontinue Warfarin as outlined above.  Antiplatelet:  ASA dose 81 mg daily.  NSAID use:  Contraindicated.  Avoid use.    2.  CAD:  Stable.  Continue Plavix, ASA, Metoprolol, Lisinopril, and Atorvastatin    3.  Follow-up:  Dr. Schaffer as scheduled in February. [facility] in 2-3 months with labs prior.      Vandana VENEGAS, CNP  [facility]   [phone]

## 2018-01-12 NOTE — PROGRESS NOTES
HPI:  Luke is a 52 year old gentleman with a past medical history of ICM, CAD with a RCA STEMI s/p PCI x 7 to RCA, left circumflex (now ), and LAD on 3/27/17 complicated by PEDRO, sepsis, sacral ulcer, DVT, bilateral hemispheric infarcts secondary to watershed ischemia, and cardiogenic shock s/p IABP, later transitioned to a subclavian balloon pump and s/p mitral clip for ischemic MR  who returns for routine follow up.    Luke has been feeling well.  He is able to walk 15 minutes without any shortness of breath.  Denies SOB, PND, orthopnea, edema, weight gain, chest pain, palpitations, lightheadedness, dizziness, near syncopal/syncopal episodes.  He has been following his salt and fluid restrictions.      PAST MEDICAL HISTORY:  Past Medical History:   Diagnosis Date     CAD (coronary artery disease) 2017    RCA STEMI s/p balloon angioplasty and multiple Sofie to RCA, LCx, and LAD     Cardiogenic shock (H)      DVT (deep venous thrombosis) (H) 2017    left internal jugular (line-associated); left common femoral; on coumadin     Ischemic cardiomyopathy 2017    EF 30-35%     Ischemic stroke (H) 2017    no residual deficits     Lower GI bleed 2017    due to rectal ulcer     Mitral valve insufficiency, ischemic 2017    s/p Mitral Clip placement      Skin ulcer of sacrum (H)      Systolic heart failure (H)      Type 2 diabetes mellitus (H)        FAMILY HISTORY:  Family History   Problem Relation Age of Onset     Hypertension Mother      Type 2 Diabetes Father      Hypertension Father      Myocardial Infarction No family hx of      CEREBROVASCULAR DISEASE No family hx of      Coronary Artery Disease Early Onset No family hx of      Colon Cancer No family hx of      Prostate Cancer No family hx of        SOCIAL HISTORY:  Social History     Social History     Marital status:      Spouse name: N/A     Number of children: N/A     Years of education: N/A     Occupational History     [employer]      Social  History Main Topics     Smoking status: Former Smoker     Packs/day: 0.50     Years: 30.00     Types: Cigarettes     Quit date: 1/1/2017     Smokeless tobacco: Never Used     Alcohol use No     Drug use: No     Sexual activity: Not Currently     Other Topics Concern     None     Social History Narrative    . Remarried.    4 children with first marriage.    2 grand children.    Walks daily, but no formal exercise.            CURRENT MEDICATIONS:  Current Outpatient Prescriptions   Medication Sig Dispense Refill     metFORMIN (GLUCOPHAGE-XR) 500 MG 24 hr tablet Take 2 tablets (1,000 mg) by mouth daily (with dinner) 180 tablet 3     glipiZIDE (GLUCOTROL) 5 MG tablet Take 1 tablet (5 mg) by mouth 2 times daily (before meals) 180 tablet 3     metoprolol (TOPROL-XL) 25 MG 24 hr tablet Take 1.5 tablets (37.5 mg) by mouth daily 30 tablet      potassium chloride SA (K-DUR/KLOR-CON M) 20 MEQ CR tablet Take 1 tablet (20 mEq) by mouth daily 90 tablet 3     bumetanide (BUMEX) 0.5 MG tablet Take 1 tablet (0.5 mg) by mouth every other day 45 tablet 3     lisinopril (PRINIVIL/ZESTRIL) 2.5 MG tablet Take 1 tablet (2.5 mg) by mouth daily 30 tablet 1     ONETOUCH ULTRA test strip USE TO TEST BLOOD SUGAR 3 TIMES DAILY OR AS DIRECTED. 100 strip 3     warfarin (COUMADIN) 3 MG tablet Take one tablet daily as directed by the  tablet 0     digoxin (LANOXIN) 125 MCG tablet Take 1 tablet (125 mcg) by mouth daily 60 tablet 5     alcohol swab prep pads Use to swab area of injection/mary alice as directed 3 x per day 100 each 11     atorvastatin (LIPITOR) 40 MG tablet 1 tablet (40 mg) by Oral or Feeding Tube route daily 60 tablet 7     clopidogrel (PLAVIX) 75 MG tablet Take 1 tablet (75 mg) by mouth daily 60 tablet 11     finasteride (PROSCAR) 5 MG tablet Take 1 tablet (5 mg) by mouth daily 60 tablet 11     ASPIRIN NOT PRESCRIBED (INTENTIONAL) Please choose reason not prescribed, below 0 each 0     tamsulosin (FLOMAX) 0.4 MG capsule  "Take 1 capsule (0.4 mg) by mouth daily 60 capsule 0     zinc sulfate (ZINCATE) 220 (50 ZN) MG capsule Take 1 capsule (220 mg) by mouth daily 2 capsule 0     Sharps Container MISC 1 each every 30 days 1 each 0     Warfarin Therapy Reminder 1 each continuous prn       Benzoyl Peroxide 2.5 % CREA Externally apply topically daily (Patient not taking: Reported on 1/12/2018) 21 g 11     tretinoin (RETIN-A) 0.05 % cream Spread a pea size amount into affected area topically at bedtime.  Use sunscreen SPF>20. (Patient not taking: Reported on 1/12/2018) 45 g 11     insulin pen needle (BD KYLEE U/F) 32G X 4 MM Use 3 daily or as directed. (Patient not taking: Reported on 1/12/2018) 100 each prn       ROS:  Constitutional: Denies fever, chills, or diaphoresis.  No weight gain/loss.   ENT: Denies visual disturbance, hearing loss, epistaxis, vertigo,   Allergies/Immunologic: Negative for seasonal allergies, anemia, or bleeding disorder.   Respiratory:  See HPI  Cardiovascular: As per HPI.   GI: Denies nausea, vomiting, diarrhea, hematemesis, melena, or hematochezia.   : Denies urinary frequency, dysuria, or hematuria.   Integument: Negative for bruising, rash, or pruritis.  Psychiatric: Negative for anxiety, depression, sleep disturbance, or mood changes.   Neuro: Negative for headaches, syncope, numbness or tingling.   Endocrinology: +DM   Musculoskeletal: Negative for gout, joint stiffness, swelling, or muscle weakness    EXAM:  BP (!) 141/93 (BP Location: Left arm, Patient Position: Chair, Cuff Size: Adult Regular)  Pulse 98  Ht 1.575 m (5' 2\")  Wt 55.2 kg (121 lb 11.2 oz)  SpO2 99%  BMI 22.26 kg/m2  General: alert, articulate, and in no acute distress.  HEENT: normocephalic, atraumatic, anicteric sclera, EOMI, normal palate, mucosa moist, dentition intact, no cyanosis.    Neck: neck supple.  No adenopathy, masses, or carotid bruits.  JVP just above the clavicle sitting upright  Heart: regular rhythm, normal S1/S2, no " murmur, gallop, rub.  Precordium quiet with normal PMI.     Lungs: clear, no rales, ronchi, or wheezing.  No accessory muscle use, respirations unlabored.   Abdomen: soft, non-tender, bowel sounds present, no hepatomegaly  Extremities: no clubbing, cyanosis or edema.  2+ pedal pulses.   Neurological: alert and oriented x 3.  normal speech and affect, no gross motor deficits  Skin:  No ecchymoses, rashes, or clubbing.    Labs:  CBC RESULTS:  Lab Results   Component Value Date    WBC 11.2 (H) 10/27/2017    RBC 4.48 10/27/2017    HGB 13.5 10/27/2017    HCT 42.2 10/27/2017    MCV 94 10/27/2017    MCH 30.1 10/27/2017    MCHC 32.0 10/27/2017    RDW 13.0 10/27/2017     10/27/2017       CMP RESULTS:  Lab Results   Component Value Date     2018    POTASSIUM 3.7 2018    CHLORIDE 105 2018    CO2 28 2018    ANIONGAP 6 2018     (H) 2018    BUN 19 2018    CR 1.04 2018    GFRESTIMATED 75 2018    GFRESTBLACK >90 2018    ROB 9.0 2018    BILITOTAL 0.6 2017    ALBUMIN 4.1 2017    ALKPHOS 63 2017    ALT 19 2017    AST 15 2017        INR RESULTS:  Lab Results   Component Value Date    INR 2.3 (A) 2017    INR 2.36 (H) 2017       No components found for: CK  Lab Results   Component Value Date    MAG 2.4 (H) 2017     Lab Results   Component Value Date    NTBNP 1599 (H) 2017       Most recent echocardiogram:  Recent Results (from the past 8760 hour(s))   Echocardiogram Limited    Narrative    [id number]  [facility]  [id number]  [id number]^MEGHAN^IVA^AMBER           [facility]  Diagnostic and Treamtent-3rd Floor  [address] [place] [address]     Name: SYDNIE SIERRA  MRN: [id number]  : 1965  Study Date: 12/15/2017 10:52 AM  Age: 52 yrs  Gender: Male  Patient Location: [facility]  Reason For Study: s/p Karena Ahuja  Ordering Physician: MEGHAN  IVA  Referring Physician: IVA CHRISTIANSON  Performed By: Sandra Fields RDCS     BSA: 1.6 m2  Height: 62 in  Weight: 123 lb  BP: 100/74 mmHg  _____________________________________________________________________________  __        Procedure  Limited Echocardiogram with portions of two-dimensional, color and spectral  Doppler performed.  _____________________________________________________________________________  __        Interpretation Summary  Moderately reduced systolic function. EF 30-35%. Global hypokinesis with  akinesis of the basal-mid inferior and basal-mid inferolateral walls.  Global right ventricular function is mildly reduced.  Status post transcatheter mitral valve repair with Mitraclip. The clip is  attached to the leaflets. There is trace to mild mitral regurgitation. Mean  gradient 5 mmHg at heart rate 97 bpm.  Pulmonary hypertension with right ventricular systolic pressure 41mmHg above  the right atrial pressure present.  No pericardial effusion.     Compared to the prior study 8/10/17 there has been no significant change.  _____________________________________________________________________________  __        Left Ventricle  Left ventricular wall thickness is normal. Mild left ventricular dilation is  present. Global hypokinesis with akinesis of the basal-mid inferior and basal-  mid inferolateral walls. Moderately reduced systolic function. EF 30-35%.  Diastolic function not assessed.     Right Ventricle  The right ventricle is normal size. Global right ventricular function is  mildly reduced. A pacemaker lead is noted in the right ventricle.     Atria  Both atria appear normal.     Mitral Valve  Status post transcatheter mitral valve repair with Mitraclip. The clip is  attached to the leaflets. There is trace to mild mitral regurgitation. Mean  gradient 5 mmHg at heart rate 97 bpm.        Aortic Valve  Aortic valve is normal in structure and function.     Tricuspid Valve  Mild  tricuspid insufficiency is present. Right ventricular systolic pressure  is 41mmHg above the right atrial pressure.     Pulmonic Valve  The pulmonic valve is normal.     Vessels  The aorta root is normal. The inferior vena cava was normal in size with  preserved respiratory variability. Estimated mean right atrial pressure is 3  mmHg.     Pericardium  No pericardial effusion is present.        Attestation  I have personally viewed the imaging and agree with the interpretation and  report as documented by the fellow, David Hill, and/or edited by me.  _____________________________________________________________________________  __  MMode/2D Measurements & Calculations     IVSd: 0.72 cm  LVIDd: 4.9 cm  LVIDs: 4.2 cm  LVPWd: 0.73 cm  FS: 14.2 %  EDV(Teich): 115.5 ml  ESV(Teich): 80.6 ml  LV mass(C)d: 118.2 grams  LV mass(C)dI: 76.1 grams/m2     EF(MOD-bp): 34.1 %  LA Volume (BP): 49.5 ml  LA Volume Index (BP): 31.9 ml/m2  RWT: 0.30           Doppler Measurements & Calculations  MV E max isabel: 126.4 cm/sec  MV A max isabel: 46.5 cm/sec  MV E/A: 2.7  MV max P.7 mmHg  MV mean P.6 mmHg  MV V2 VTI: 26.5 cm  MV dec time: 0.13 sec  TR max isabel: 321.2 cm/sec  TR max P.3 mmHg     _____________________________________________________________________________  __           Report approved by: Isabel Alex 12/15/2017 12:24 PM      ECHO COMPLETE WITH OPTISON    Narrative    [id number]  [facility]  [id number]  [id number]^MEGHAN^IVA^AMBER           [facility]  Diagnostic and Treamtent-3rd Floor  [address] [place] [id number]     Name: SYDNIE SIERRA  MRN: [id number]  : 1965  Study Date: 08/10/2017 09:02 AM  Age: 52 yrs  Gender: Male  [place] [facility] [place] For Study: Nonrheumatic mitral (valve) insufficiency  Ordering Physician: IVA CHRISTIANSON  Referring Physician: IVA CHRISTIANSON  Performed By: True Newman RDCS     BSA: 1.5 m2  Height: 62  in  Weight: 118 lb  _____________________________________________________________________________  __        Procedure  Echocardiogram with two-dimensional, color and spectral Doppler performed.  _____________________________________________________________________________  __        Interpretation Summary  Moderately (EF 30-35%) reduced left ventricular function is present (bi-plane  32%). Basal lateral, inferior, and inferoseptal hypokinesis.  Global RV function mildly reduced.  S/p trans-catheter MVR 6/2017. Mild mitral insufficiency is present. Mean  valve gradient 5 mmHg at a heart rate of 104 bpm.  Right ventricular systolic pressure is 40mmHg above the right atrial pressure.  The inferior vena cava is normal in size with preserved respiratory  variability.  No pericardial effusion is present.  _____________________________________________________________________________  __        Left Ventricle  Left ventricular wall thickness is normal. Left ventricular size is normal.  Diastolic function cannot be assessedBiplane traced at 32%. Moderately (EF 35-  40%) reduced left ventricular function is present. Basal lateral and inferior,  basal and mid inferoseptal, basal inferior hypokinesis.     Right Ventricle  The right ventricle is normal size. Global right ventricular function is  mildly reduced.     Atria  The right atria appears normal. Mild left atrial enlargement is present. The  atrial septum is intact as assessed by color Doppler .        Mitral Valve  S/p mitral valve repair. Mean gradient across the valve is 5 mmHg. Mild mitral  annular calcification is present. Mild mitral insufficiency is present. The  mean mitral valve gradient is 4.6 mmHg.     Aortic Valve  The aortic valve is tricuspid. Trace aortic insufficiency is present.     Tricuspid Valve  Mild tricuspid insufficiency is present. Right ventricular systolic pressure  is 40mmHg above the right atrial pressure.     Pulmonic Valve  The pulmonic  valve is normal. Trace pulmonic insufficiency is present.     Vessels  The aorta root is normal. The inferior vena cava was normal in size with  preserved respiratory variability. Estimated mean right atrial pressure is 3  mmHg.     Pericardium  No pericardial effusion is present.        Compared to Previous Study  This study was compared with the study from 2017 . The RVSP is marginally  higher.     Attestation  I have personally viewed the imaging and agree with the interpretation and  report as documented by the fellow, Dmitriy Merino, and/or edited by me.  _____________________________________________________________________________  __     MMode/2D Measurements & Calculations  IVSd: 0.73 cm  LVIDd: 5.3 cm  LVIDs: 4.5 cm  LVPWd: 0.46 cm  FS: 14.0 %  EDV(Teich): 134.6 ml  ESV(Teich): 94.8 ml  LV mass(C)d: 103.8 grams  LV mass(C)dI: 67.9 grams/m2  Ao root diam: 2.6 cm  asc Aorta Diam: 3.1 cm  LVOT diam: 1.8 cm  LVOT area: 2.6 cm2        Doppler Measurements & Calculations  MV max P.3 mmHg  MV mean P.6 mmHg  MV V2 VTI: 23.7 cm  TR max isabel: 317.6 cm/sec  TR max P.4 mmHg        _____________________________________________________________________________  __        Report approved by: Isabel Daniel 08/10/2017 03:02 PM      Echocardiogram Limited    Narrative    [id number]  [facility]  [id number]  [id number]^MEGHAN^IVA^AMBER           [facility]  Diagnostic and Treamtent-3rd Floor  [address] [place] [address]     Name: SYDNIE SIERRA  MRN: [id number]  : 1965  Study Date: 2017 08:34 AM  Age: 51 yrs  Gender: Male  [place] [facility] [place] For Study: S/P MV clip  History: S/P MV clip  Ordering Physician: IVA CHRISTIANSON  Referring Physician: IVA CHRISTIANSON  Performed By: Lila Miner RDCS     BSA: 1.5 m2  Height: 61 in  Weight: 120 lb  BP: 100/60  mmHg  _____________________________________________________________________________  __        Procedure  Limited Echocardiogram with portions of two-dimensional, color and spectral  Doppler performed.  _____________________________________________________________________________  __        Interpretation Summary  Moderately (EF 30-35%) reduced left ventricular function is present. Inferior  wall akinesis. Basal and mid inferoseptal and inferolateral hypokinesis.  Global right ventricular function is mildly reduced.  S/p trans-catheter [id number]-[id number] mitral valve repair (TMVR). Mean gradient of  3.4 mmHg at a HR of 96 bpm. Trace to mild mitral insufficiency is present.  The inferior vena cava was normal in size with preserved respiratory  variability.  No pericardial effusion is present.  Compared to study from 5/2/17 there is no significant change.  _____________________________________________________________________________  __        Left Ventricle  Left ventricular wall thickness is normal. Left ventricular size is normal.  Moderately (EF 30-35%) reduced left ventricular function is present. Left  ventricular diastolic function is indeterminate. Inferior wall akinesis. Basal  and mid inferoseptal and inferolateral hypokinesis.     Right Ventricle  The right ventricle is normal size. Global right ventricular function is  mildly reduced.     Atria  Both atria appear normal. The atrial septum is intact as assessed by color  Doppler .     Mitral Valve  Trace to mild mitral insufficiency is present. S/p trans-catheter lyyp-zs-rads  mitral valve repair (TMVR). Mean gradient of 3.4 mmHg at a HR of 96 bpm.        Aortic Valve  Aortic valve is normal in structure and function. The aortic valve is  tricuspid.     Tricuspid Valve  The tricuspid valve is normal. Trace tricuspid insufficiency is present. Right  ventricular systolic pressure is 29mmHg above the right atrial pressure.  Pulmonary artery systolic pressure is  normal.     Pulmonic Valve  The pulmonic valve cannot be assessed.     Vessels  The inferior vena cava was normal in size with preserved respiratory  variability. The aorta root cannot be assessed. The pulmonary artery cannot be  assessed. Normal pulmonary arteries.     Pericardium  No pericardial effusion is present.        Compared to Previous Study  Compared to study from 17 there is no significant change.     Attestation  I have personally viewed the imaging and agree with the interpretation and  report as documented by the fellow, Dr. Josue Ibarra, and/or edited by me.  _____________________________________________________________________________  __     MMode/2D Measurements & Calculations  LA Volume (BP): 32.7 ml  LA Volume Index (BP): 21.5 ml/m2        Doppler Measurements & Calculations  MV E max isabel: 136.7 cm/sec  MV A max isabel: 95.2 cm/sec  MV E/A: 1.4  MV max P.1 mmHg  MV mean PG: 3.4 mmHg  MV V2 VTI: 30.7 cm  MV P1/2t max isabel: 144.2 cm/sec  MV P1/2t: 50.5 msec  MVA(P1/2t): 4.4 cm2  MV dec slope: 836.5 cm/sec2  MV dec time: 0.19 sec     Ao V2 max: 131.5 cm/sec  Ao max P.0 mmHg  Ao V2 mean: 91.3 cm/sec  Ao mean PG: 3.8 mmHg  Ao V2 VTI: 19.3 cm  LV V1 max PG: 3.2 mmHg  LV V1 max: 89.1 cm/sec  LV V1 VTI: 13.4 cm  TR max isabel: 267.1 cm/sec  TR max P.5 mmHg  Lateral E/e': 27.7  Medial E/e': 35.3     _____________________________________________________________________________  __           Report approved by: Isabel Love 2017 10:17 AM      Echocardiogram    Narrative    [id number]  [facility]19  [id number]  [id number]^AQUILES^DESMOND^           [facility],[place]  Echocardiography Laboratory  [address] [place] [id number]     Name: SYDNIE SIERRA  MRN: [id number]  : 1965  Study Date: 2017 12:03 PM  Age: 51 yrs  Gender: Male  Patient [facility]: [facility]  Reason For Study: Mitral Valve Repair  Ordering Physician: DESMOND KWAN  Referring Physician: MARCH,  CHELE SAMANO  Performed By: Bryant Reed RDCS     BSA: 1.6 m2  Height: 62 in  Weight: 130 lb  BP: 111/65 mmHg  _____________________________________________________________________________  __        Procedure  Complete Portable Echo Adult.  _____________________________________________________________________________  __        Interpretation Summary  S/p trans-catheter yiwr-zb-yrnr mitral valve repair (TMVR). There is less than  mild residual mitral regurgitation. The mean gradient is 4.7 mmHg at a heart  rate in excess of 115 bpm.  Dilation of the inferior vena cava is present with normal respiratory  variation in diameter. Estimated mean right atrial pressure is 3-8 mmHg.  No pericardial effusion is present.     _____________________________________________________________________________  __        Left Ventricle  Left ventricular size is normal. Left ventricular wall thickness is normal.  The Ejection Fraction is estimated at 30-35%. Diastolic function not assessed  due to tachycardia. Basal inferolateral hypokinesis.     Right Ventricle  The right ventricle is normal size. Global right ventricular function is  normal.     Atria  The right atria appears normal. Mild left atrial enlargement is present.     Mitral Valve  S/p trans-catheter nhqw-eh-gdrf mitral valve repair (TMVR). There is less than  mild residual mitral regurgitation. The mean gradient is 4.7 mmHg at a heart  rate in excess of 115 bpm.        Aortic Valve  Trace aortic insufficiency is present.     Tricuspid Valve  Mild tricuspid insufficiency is present. Mild pulmonary hypertension is  present. Right ventricular systolic pressure is 32mmHg above the right atrial  pressure.     Pulmonic Valve  The pulmonic valve cannot be assessed.     Vessels  Dilation of the inferior vena cava is present with normal respiratory  variation in diameter. Estimated mean right atrial pressure is 3-8 mmHg.     Pericardium  No pericardial effusion is present.      _____________________________________________________________________________  __  MMode/2D Measurements & Calculations  LA Volume (BP): 53.6 ml           Doppler Measurements & Calculations  MV max PG: 10.1 mmHg  MV mean P.7 mmHg  MV V2 VTI: 22.3 cm  TV max P.9 mmHg  TR max isabel: 282.3 cm/sec  TR max P.9 mmHg           _____________________________________________________________________________  __           Report approved by: Isabel Daniel 2017 01:21 PM      ECHO LIMITED WITH OPTISON    Narrative    [id number]  [id number]  [id number]  [id number]^ESTELA^CORTES^MCKENZIE           [facility],[place]  Echocardiography Laboratory  [address] [place] [address]     Name: SYDNIE SIERRA  MRN: [id number]  : 1965  Study Date: 2017 04:42 PM  Age: 51 yrs  Gender: Male  Patient Location: Counts include [facility] beds at the [facility]  Reason For Study: Mitral Regurgitation  Ordering Physician: CORTES PALMER  Referring Physician: CHELE ASIF  Performed By: Emily Gutierrez DARRIAN     BSA: 1.6 m2  Height: 62 in  Weight: 134 lb  BP: 137/74 mmHg  _____________________________________________________________________________  __        Procedure  Limited Portable Echo Adult. Contrast Optison. Optison (NDC #3653-0245-81)  given intravenously. Patient was given 5 ml mixture of 3 ml Optison and 6 ml  saline. 4 ml wasted.  _____________________________________________________________________________  __        Interpretation Summary  The Ejection Fraction is estimated at 30-35% (calculated, 36%) at a heart rate  in excess of 130 bpm.  Severe mitral insufficiency is present.  _____________________________________________________________________________  __        Left Ventricle  Left ventricular size is normal. Left ventricular wall thickness is normal.  The Ejection Fraction is estimated at 30-35%. Basal lateral, inferior  akinesis.     Right Ventricle  The right ventricle is normal size. Global right ventricular  function is  normal.     Mitral Valve  Severe mitral insufficiency is present.     Tricuspid Valve  Mild tricuspid insufficiency is present. Right ventricular systolic pressure  is 32mmHg above the right atrial pressure.        Pericardium  No pericardial effusion is present.  _____________________________________________________________________________  __     MMode/2D Measurements & Calculations  IVSd: 0.73 cm  LVIDd: 4.9 cm  LVIDs: 4.1 cm  LVPWd: 0.88 cm  FS: 17.8 %  EDV(Teich): 115.0 ml  ESV(Teich): 72.5 ml  LV mass(C)d: 134.7 grams        Doppler Measurements & Calculations  MR ERO: 0.26 cm2  MR volume: 20.4 ml  TV max P.7 mmHg  TR max isabel: 281.6 cm/sec  TR max P.7 mmHg              _____________________________________________________________________________  __        Report approved by: Isabel Daniel 2017 06:42 PM      Echo Complete Bubble    Narrative    [id number]  [id number]  [id number]  [id number]^SUZANNE^CHRISTIANO^STEF           [facility]  Echocardiography Laboratory  [address] [place] [id number]     Name: SYDNIE SIERRA  MRN: [id number]  : 1965  Study Date: 2017 11:53 AM  Age: 51 yrs  Gender: Male  Patient Location: [facility]  Reason For Study: Pre LVAD  Ordering Physician: CHRISTIANO PALACIOS  Referring Physician: CHELE ASIF  Performed By: Yung Garsia DARRIAN     BSA: 1.6 m2  Height: 62 in  Weight: 136 lb  HR: 108  BP: 100/66 mmHg  _____________________________________________________________________________  __        Procedure  Bubble Echocardiogram with two-dimensional, color and spectral Doppler  performed.  _____________________________________________________________________________  __        Interpretation Summary  Moderately (EF 38%) reduced left ventricular function is present.  Global right ventricular function is normal.  Mild to moderate tricuspid and mitral insufficiency is present.  The atrial septum is intact as  assessed by color Doppler and agitated saline  bubble study .  Estimated pulmonary artery systolic pressure is 38 mmHg plus right atrial  pressure.  The inferior vena cava was dilated at 2.1 cm without respiratory variability,  consistent with increased right atrial pressure.  No pericardial effusion is present.  _____________________________________________________________________________  __        Left Ventricle  Left ventricular size is normal. :LVIDd/s = 4.6/4.0 cm. Moderately (EF 35-40%)  reduced left ventricular function is present.     Right Ventricle  The right ventricle is normal size. Global right ventricular function is  normal. TAPSE 1.4 cm, S' 10 cm/s. A right heart catheter is noted in the right  ventricle.     Atria  Both atria appear normal. The atrial septum is intact as assessed by color  Doppler and agitated saline bubble study .     Mitral Valve  Mild mitral annular calcification is present. Mild to moderate mitral  insufficiency is present.        Aortic Valve  Mild aortic insufficiency is present.     Tricuspid Valve  Moderate tricuspid insufficiency is present. Estimated pulmonary artery  systolic pressure is 38 mmHg plus right atrial pressure.     Pulmonic Valve  The pulmonic valve cannot be assessed.     Vessels  The inferior vena cava was dilated at 2.1 cm without respiratory variability,  consistent with increased right atrial pressure.     Pericardium  No pericardial effusion is present.        Compared to Previous Study  This study was compared with the study from 4.5.17 . There has been no change.  _____________________________________________________________________________  __  MMode/2D Measurements & Calculations  IVSd: 1.0 cm     LVIDd: 4.6 cm  LVIDs: 4.0 cm  LVPWd: 1.1 cm  FS: 13.1 %  EDV(Teich): 99.3 ml  ESV(Teich): 71.3 ml  LV mass(C)d: 175.6 grams  Ao root diam: 3.0 cm  asc Aorta Diam: 3.0 cm  RVOT diam: 1.8 cm  LA Volume (BP): 40.1 ml  LA Volume Index (BP): 24.8 ml/m2            Doppler Measurements & Calculations  MV E max isabel: 111.0 cm/sec  MV A max isabel: 61.6 cm/sec  MV E/A: 1.8  MV dec time: 0.14 sec  PA acc time: 0.07 sec  TR max isabel: 307.0 cm/sec  TR max P.7 mmHg  Lateral E/e': 13.2  Medial E/e': 18.9           _____________________________________________________________________________  __           Report approved by: Isabel Alex 2017 02:49 PM      Echo limited (Adults Only)    Narrative    [id number]  ECH11  [id number]  [id number]^MAIRA^DIVINE^[facility]  [facility]  [address] [place] [address]     Name: SYDNIE SIERRA  MRN: [id number]  : 1965  Study Date: 2017 01:48 PM  Age: 51 yrs  Gender: Male  Patient Location: [facility]  Reason For Study: CAD, Eval MR. EF  Ordering Physician: DIVINE RAO  Referring Physician: CHELE ASIF  Performed By: Yung Garsia DARRIAN     BSA: 1.6 m2  Height: 62 in  Weight: 138 lb  HR: 111  BP: 82/47 mmHg  _____________________________________________________________________________  __        Procedure  Limited Portable Echo Adult.  _____________________________________________________________________________  __        Interpretation Summary  Moderately (EF 35-40%) reduced left ventricular function is present. Traced at  40%.  Moderate mitral insufficiency is present.  The cause of the mitral insufficiency appears to be restricted posterior  leaflet from posterior MI. No mitral leaflet pathology seen.  Dilation of the inferior vena cava is present with abnormal respiratory  variation in diameter.     No change from last study.  _____________________________________________________________________________  __        Left Ventricle  Left ventricular size is normal. Moderately (EF 35-40%) reduced left  ventricular function is present. Posterior wall akinesis is present. Inferior  wall akinesis is present. Lateral wall akinesis is present.      Right Ventricle  The right ventricle is normal size. Global right ventricular function is  normal. A right heart catheter is noted in the right ventricle.     Mitral Valve  Moderate mitral insufficiency is present. The cause of the mitral  insufficiency appears to be restricted posterior leaflet.     Tricuspid Valve  Trace tricuspid insufficiency is present.        Vessels  Dilation of the inferior vena cava is present with abnormal respiratory  variation in diameter.     Pericardium  No pericardial effusion is present.  _____________________________________________________________________________  __                                Report approved by: Isabel Yepez 2017 03:04 PM                    _____________________________________________________________________________  __      ECHO COMPLETE WITH OPTISON    Narrative    [id number]  ECH73  [id number]  [id number]^MAIRA^DIVINE^CHECO           [facility],[place]  Echocardiography Laboratory  [address] [place] [address]     Name: SYDNIE SIERRA  MRN: [id number]  : 1965  Study Date: 2017 07:21 PM  Age: 51 yrs  Gender: Male  Patient Location: [facility]  Reason For Study: Heart Failure. Comments: pls assess LV and RV function and  mitral regurg/papillary muscle rupture?  Ordering Physician: DIVINE RAO  Referring Physician: CHELE ASIF  Performed By: Tony Mallory RDCS     BSA: 1.6 m2  Height: 62 in  Weight: 135 lb  HR: 137  BP: 98/56 mmHg  _____________________________________________________________________________  __        Procedure  Complete Portable Echo Adult. Contrast Optison. Optison (NDC #2325-5569-04)  given intravenously. Patient was given 3.5 ml mixture of 3 ml Optison and 6 ml  saline. 5.5 ml wasted.  _____________________________________________________________________________  __        Interpretation Summary  Left ventricular size is normal.  The Ejection Fraction is estimated at  35-40%.  Inferior wall akinesis is present.  Lateral wall akinesis is present.  Posterior wall akinesis is present.  Right ventricular function, chamber size, wall motion, and thickness are  normal.  Moderate mitral insufficiency is present.  Moderate tricuspid insufficiency is present.  Right ventricular systolic pressure is 47mmHg above the right atrial pressure.  The inferior vena cava is normal.  _____________________________________________________________________________  __        Left Ventricle  Left ventricular size is normal. Left ventricular wall thickness is normal.  The Ejection Fraction is estimated at 35-40%. Grade III or advanced diastolic  dysfunction. Inferior wall akinesis is present. Lateral wall akinesis is  present. Posterior wall akinesis is present.     Right Ventricle  Right ventricular function, chamber size, wall motion, and thickness are  normal.     Atria  Both atria appear normal.     Mitral Valve  Moderate mitral insufficiency is present.        Aortic Valve  Aortic valve is normal in structure and function.     Tricuspid Valve  Moderate tricuspid insufficiency is present. Right ventricular systolic  pressure is 47mmHg above the right atrial pressure.     Pulmonic Valve  The pulmonic valve is normal. Trace pulmonic insufficiency is present.     Vessels  The aorta root is normal. The pulmonary artery is normal. The inferior vena  cava is normal.     Compared to Previous Study  Previous study not available for comparison.     _____________________________________________________________________________  __  MMode/2D Measurements & Calculations  LA Volume (BP): 49.0 ml     LA Volume Index (BP): 30.2 ml/m2        Doppler Measurements & Calculations  MV E max frank: 119.4 cm/sec  MV A max frank: 56.8 cm/sec  MV E/A: 2.1  MV dec time: 0.08 sec  Ao V2 max: 141.6 cm/sec  Ao max P.0 mmHg  PA V2 max: 104.7 cm/sec  PA max P.4 mmHg  TR max frank: 329.6 cm/sec  TR max P.5 mmHg  Pulm Sys Frank:  30.5 cm/sec  Pulm Armando Frank: 52.8 cm/sec  Pulm S/D: 0.58  Lateral E/e': 17.5  Medial E/e': 15.3              _____________________________________________________________________________  __        Report approved by: Isabel Love 2017 10:11 PM      ECHO LIMITED WITH OPTISON    Narrative    [id number]  ECH74  [id number]  [id number]^LEONARD ECKERT^RODOLFO^EMBER           [facility],[place]  Echocardiography Laboratory  [address] [place] [address]     Name: SYDNIE SIERRA  MRN: [id number]  : 1965  Study Date: 2017 11:40 AM  Age: 51 yrs  Gender: Male  Patient Location: [facility]  Reason For Study: CAD- Assess for WMA  History: CAD- Assess for WMA  Ordering Physician: RODOLFO BROWNE  Referring Physician: CHELE ASIF  Performed By: Lila Miner RDCS     BSA: 1.7 m2  Height: 62 in  Weight: 144 lb  BP: 115/57 mmHg  _____________________________________________________________________________  __        Procedure  Limited Portable Echo Adult. Contrast Optison. Optison (NDC #5033-5047-98)  given intravenously. Patient was given 6 ml mixture of 3 ml Optison and 6 ml  saline. 3 ml wasted.  _____________________________________________________________________________  __        Interpretation Summary  Inferior,posterior,lateral,basal septal wall akinesis as reported before. No  change.  _____________________________________________________________________________  __        Left Ventricle  The Ejection Fraction is estimated at 50-55%. Inferior,posterior,lateral,basal  septal wall akinesis as reported before. No change.  _____________________________________________________________________________  __           Doppler Measurements & Calculations  MV E max frank: 117.7 cm/sec  MV A max frank: 62.2 cm/sec  MV E/A: 1.9  MV dec time: 0.09 sec  Lateral E/e': 16.1  Medial E/e': 18.0              _____________________________________________________________________________  __         Report approved by: Isabel Love 2017 12:20 PM      ECHO LIMITED WITH OPTISON    Narrative    [id number]  ECH74  [id number]  [id number]^LEONARD ECKERT^RODOLFO^EMBER           [facility],[place]  Echocardiography Laboratory  [address] [place] [address]     Name: SYDNIE SIERRA  MRN: [id number]  : 1965  Study Date: 2017 08:54 AM  Age: 51 yrs  Gender: Male  Patient Location: [facility]  Reason For Study: CHF  Ordering Physician: RODOLFO BROWNE  Referring Physician: CHELE ASIF  Performed By: Emily Gutierrez RDCS     BSA: 1.6 m2  Height: 62 in  Weight: 139 lb  BP: 124/97 mmHg  _____________________________________________________________________________  __        Procedure  Limited Portable Echo Adult. Contrast Optison. Optison (NDC #7125-7454-65)  given intravenously. Patient was given 5 ml mixture of 3 ml Optison and 6 ml  saline. 4 ml wasted.  _____________________________________________________________________________  __        Interpretation Summary  Limited study. Ischemic CM. Moderately (EF 30-35%) reduced left ventricular  function is present. Traced at 32%.  Posterior wall akinesis is present. Lateral wall akinesis is present. Inferior  wall akinesis is present.  Right ventricular function, chamber size, wall motion, and thickness are  normal.  Dilation of the inferior vena cava is present with abnormal respiratory  variation in diameter.     Compared to prior study, RV fxn is improved.  _____________________________________________________________________________  __        Left Ventricle  Left ventricular size is normal. Mild concentric wall thickening consistent  with left ventricular hypertrophy is present. Moderately (EF 30-35%) reduced  left ventricular function is present. Posterior wall akinesis is present.  Lateral wall akinesis is present. Inferior wall akinesis is present.     Right Ventricle  Right ventricular function, chamber size, wall  motion, and thickness are  normal.     Mitral Valve  Mild to moderate mitral insufficiency is present.     Aortic Valve  The aortic valve is tricuspid. Trace aortic insufficiency is present.        Tricuspid Valve  Trace to mild tricuspid insufficiency is present. The right ventricular  systolic pressure is approximated at 27.8 mmHg plus the right atrial pressure.     Vessels  The aorta root is normal. Dilation of the inferior vena cava is present with  abnormal respiratory variation in diameter.     Pericardium  No pericardial effusion is present.  _____________________________________________________________________________  __     MMode/2D Measurements & Calculations  IVSd: 1.1 cm  LVIDd: 4.4 cm  LVIDs: 3.8 cm  LVPWd: 1.4 cm  FS: 12.6 %  EDV(Teich): 86.7 ml  ESV(Teich): 63.0 ml  LV mass(C)d: 201.6 grams  TAPSE: 1.2 cm        Doppler Measurements & Calculations  MR ERO: 0.15 cm2  MR volume: 13.4 ml  TV max P.8 mmHg     TR max isabel: 263.8 cm/sec  TR max P.8 mmHg           _____________________________________________________________________________  __           Report approved by: Isabel Yepez 2017 10:04 AM      Echocardiogram Limited    Narrative    [id number]  [facility]11  [id number]  [id number]^MARCH^CHELE^JAZMYNE           [facility]  Echocardiography Laboratory  [address] [place] [address]        Name: SYDNIE SIERRA  MRN: [id number]  : 1965  Study Date: 2017 12:46 PM  Age: 51 yrs  Gender: Male  Patient Location: [facility]  Reason For Study: Murmur, MI - Acute  Ordering Physician: CHELE ASIF  Referring Physician: PABLO PMRONALD  Performed By: Flo Shoemaker RDCS     BSA: 1.6 m2  Height: 62 in  Weight: 136 lb  HR: 99  BP: 134/62 mmHg  _____________________________________________________________________________  __        Procedure  Limited Portable Echo Adult.  _____________________________________________________________________________  __         Interpretation Summary     The visual ejection fraction is estimated at 30-35%.  There is extensive inferior, inferoseptal, and inferolateral wall akinesis.  Basal/mid lateral wall hypokinesis  There is moderate to severe septal hypokinesis.  Septal wall motion abnormality may reflect pacemaker activation.  There is a catheter/pacemaker lead seen in the right ventricle.  The right ventricle is mildly dilated.  Severely decreased right ventricular systolic function  There is no pericardial effusion.  The rhythm was paced.  Compared to the prior study, the LVEF appears somewhat decreased. Septal wall  motion abnormality larger- may reflect, in part, that patient in sinus tach on  prior study, and ventricular paced now. No hemodynamically significant  valvular abnormalities on 2D or color flow imaging.  _____________________________________________________________________________  __        Left Ventricle  The left ventricle is normal in size. Left ventricular hypertrophy is noted by  two-dimensional echocardiography. The visual ejection fraction is estimated at  30-35%. There is inferior wall akinesis. There is moderate to severe septal  hypokinesis. Septal wall motion abnormality may reflect pacemaker activation.     Right Ventricle  There is a catheter/pacemaker lead seen in the right ventricle. The right  ventricle is mildly dilated. Severely decreased right ventricular systolic  function.     Atria  Normal left atrial size. Right atrial size is normal.     Mitral Valve  The mitral valve is normal in structure and function. There is trace mitral  regurgitation.        Tricuspid Valve  The tricuspid valve is not well visualized, but is grossly normal. There is  trace tricuspid regurgitation. Right ventricular systolic pressure could not  be approximated due to inadequate tricuspid regurgitation.     Aortic Valve  The aortic valve is trileaflet. There is trace aortic regurgitation. No aortic  stenosis is present.      Pulmonic Valve  The pulmonic valve is not well visualized. There is no pulmonic valvular  regurgitation.     Vessels  The aortic root is normal size. Normal size ascending aorta. Inferior vena  cava not well visualized for estimation of right atrial pressure.     Pericardium  There is no pericardial effusion.        Rhythm  The rhythm was paced.  _____________________________________________________________________________  __                                Report approved by: Isabel Whitney 2017 02:17 PM                    _____________________________________________________________________________  __      Echocardiogram Limited    Narrative    [id number]  [facility]  [id number]  [id number]^LULU^KATHLEEN^EVANGELINA           [facility]  Echocardiography Laboratory  [address] [place] [address]        Name: SYDNIE SIERRA  MRN: [id number]  : 1965  Study Date: 2017 07:22 PM  Age: 51 yrs  Gender: Male  [place] [facility] [place]  Reason For Study: MI - Acute  History: acute MI  Ordering Physician: KATHLEEN LAWSON  Performed By: Jitendra Davis RDCS     BSA: 1.7 m2  Height: 62 in  Weight: 151 lb  _____________________________________________________________________________  __        Procedure  Limited Portable Echo Adult. (Emergent exam, abbreviated study performed).  _____________________________________________________________________________  __        Interpretation Summary     The visual ejection fraction is estimated at 40%.  There is inferior, ineroseptal & inferolateral wall akinesis, entire walls.  The left ventricular cavity is small.  The right ventricle is mild to moderately dilated.  The right ventricular systolic function is severely reduced.  There is no pericardial effusion.  There is mild to moderate (1-2+) tricuspid regurgitation.  Limited views were obtained.  _____________________________________________________________________________  __        Left  Ventricle  The left ventricle is normal in size. The left ventricular cavity is small.  Left ventricular hypertrophy is noted by two-dimensional echocardiography. The  visual ejection fraction is estimated at 40%. There is inferior &  inferolateral wall akinesis.     Right Ventricle  The right ventricle is mild to moderately dilated. The right ventricular  systolic function is severely reduced.     Atria  Normal left atrial size. The right atrium is mildly dilated.        Mitral Valve  The mitral valve is normal in structure and function. There is mild (1+)  mitral regurgitation.     Tricuspid Valve  The tricuspid valve is normal in structure and function. There is mild to  moderate (1-2+) tricuspid regurgitation.     Aortic Valve  The aortic valve is normal in structure and function.     Pulmonic Valve  The pulmonic valve is not well visualized. There is no pulmonic valvular  regurgitation.     Pericardium  There is no pericardial effusion.        Rhythm  Indeterminate rhtym (no EKG trace, no MV continuous wave Doppler).  _____________________________________________________________________________  __  MMode/2D Measurements & Calculations  IVSd: 1.1 cm     LVIDd: 4.2 cm  LVIDs: 3.1 cm  LVPWd: 1.2 cm  FS: 27.3 %  EDV(Teich): 78.5 ml  ESV(Teich): 36.4 ml  LV mass(C)d: 165.8 grams  Ao root diam: 2.9 cm  LA dimension: 3.5 cm  LA/Ao: 1.2                 _____________________________________________________________________________  __           Report approved by: Isabel Whitney 03/27/2017 08:27 AM            Assessment and Plan:    In summary, Mr. Henao is a 52 year old gentleman with ICM who appears euvolemic and is doing well from a cardiac perspective.  I have increased his Toprol XL to 50 mg daily and decreased his digoxin to 0.125 mg four times a week as his dig level was 1.0 today.  I am unable to titrate ACE or add spironolactone as he had transient renal dysfunction with prior attempts. Since his ultrasound  was negative for DVT and he has been treated for the recommended three months, will d/c warfarin at this time.      1.  Chronic a heart failure secondary to ICM.  Stage C  NYHA Class II  ACEi/ARB: contraindicated due to transient renal dysfunction  BB: titration ongoing.  Increase to Metoprolol XL 50 mg daily.  Aldosterone antagonist: contraindicated due to transient renal dysfunction with prior attempts  SCD prophylaxis: ICD  Fluid status: euvolemic  Anticoagulation: Warfarin.  Discontinue Warfarin as outlined above.  Antiplatelet:  ASA dose 81 mg daily.  NSAID use:  Contraindicated.  Avoid use.    2.  CAD:  Stable.  Continue Plavix, ASA, Metoprolol, Lisinopril, and Atorvastatin    3.  Follow-up:  Dr. Schaffer as scheduled in February. CORE clinic in 2-3 months with labs prior.      Vandana VENEGAS, CNP  [facility]   [phone]

## 2018-01-12 NOTE — PATIENT INSTRUCTIONS
"You were seen today in the Cardiovascular Clinic at the [facility].     Cardiology Providers you saw during your visit: Vandana Zambrano NP      1.  Discontinue Warfarin  2.  Decrease your digoxin to 0.125 mg on , , , and .  3.  Increase your metoprolol to 50 mg daily.   4.  Follow up in CORE clinic in 2-3 months.   Please make a follow-up CORE/heart failure appt with Dr. Schaffer as scheduled on .      Please limit your fluid intake to 2 L (64 ounces) daily.  2 Liters a day = 8.5 cups, or 72 ounces.  Please limit your salt intake to 2 grams a day or less.    If you gain 2# in 24 hours or 5# in one week call Herminia Jiménez RN so we can adjust your medications as needed over the phone.    Please feel free to call me with any questions or concerns.    Vandana VENEGAS CNP      [facility]  Cardiology Care Coordinator-Heart Failure Clinic    Questions and [phone].   First press #1 for the [facility] and then press #3 for \"Medical Questions\" to reach us Cardiology Nurses.     On Call Cardiologist for after hours or on weekends: [phone]   option #4 and ask to speak to the on-call Cardiologist. Inform them you are a CORE/heart failure patient at the [facility].        If you need a medication refill please contact your pharmacy.  Please allow 3 business days for your refill to be completed.  _______________________________________________________  [facility]IN[facility] Cardiomyopathy, Optimization, Rehabilitation, Education   The C.O.R.E. CLINIC is a heart failure specialty clinic within the [facility] where you will work with specialized nurse practitioners dedicated to helping patients with heart failure carefully adjust medications, receive education, and learn who and when to call if symptoms develop. They specialize in helping you better understand your condition, slow the progression of your disease, improve the " length and quality of your life, help you detect future heart problems before they become life threatening, and avoid hospitalizations.  As always, thank you for trusting us with your health care needs!

## 2018-01-12 NOTE — NURSING NOTE
Chief Complaint   Patient presents with     Follow Up For     Return CORE; 52yr old male with a h/o chronic systolic HF secondary to ICM with reduced EF 32%. S/P mitralclip presenting for follow-up with labs prior     Vitals were taken and medications were reconciled.     Karon Ragland MA    1:26 PM

## 2018-01-18 RX ORDER — METOPROLOL SUCCINATE 50 MG/1
50 TABLET, EXTENDED RELEASE ORAL DAILY
Qty: 90 TABLET | Refills: 3 | Status: SHIPPED | OUTPATIENT
Start: 2018-01-18 | End: 2018-03-23

## 2018-02-20 ENCOUNTER — PRE VISIT (OUTPATIENT)
Dept: CARDIOLOGY | Facility: CLINIC | Age: 53
End: 2018-02-20

## 2018-02-20 NOTE — TELEPHONE ENCOUNTER
HPI 01/12/18:  52 year old gentleman with a past medical history of ICM, CAD with a RCA STEMI s/p PCI x 7 to RCA, left circumflex (now ), and LAD on 3/27/17 complicated by PEDRO, sepsis, sacral ulcer, DVT, bilateral hemispheric infarcts secondary to watershed ischemia, and cardiogenic shock s/p IABP, later transitioned to a subclavian balloon pump and s/p mitral clip for ischemic MR  who returns for routine follow up.     Luke has been feeling well.  He is able to walk 15 minutes without any shortness of breath.  Denies SOB, PND, orthopnea, edema, weight gain, chest pain, palpitations, lightheadedness, dizziness, near syncopal/syncopal episodes.  He has been following his salt and fluid restrictions.        Procedures done:     ULTRASOUND ANKLE-BRACHIAL INDEX DOPPLER WITH EXERCISE BILATERAL 12/27/17:  IMPRESSION: Normal resting and exercise ABIs bilaterally without  evidence of arterial insufficiency.      ECHO 12/15/17:    Interpretation Summary  Moderately reduced systolic function. EF 30-35%. Global hypokinesis with  akinesis of the basal-mid inferior and basal-mid inferolateral walls.  Global right ventricular function is mildly reduced.  Status post transcatheter mitral valve repair with Mitraclip. The clip is  attached to the leaflets. There is trace to mild mitral regurgitation. Mean  gradient 5 mmHg at heart rate 97 bpm.  Pulmonary hypertension with right ventricular systolic pressure 41mmHg above  the right atrial pressure present.  No pericardial effusion.     Compared to the prior study 8/10/17 there has been no significant change.  _____________________________________________________________________________  __    EKG 10/27/17:                No notes on file

## 2018-02-22 ENCOUNTER — ALLIED HEALTH/NURSE VISIT (OUTPATIENT)
Dept: CARDIOLOGY | Facility: CLINIC | Age: 53
End: 2018-02-22
Attending: NURSE PRACTITIONER
Payer: COMMERCIAL

## 2018-02-22 VITALS
HEIGHT: 62 IN | HEART RATE: 107 BPM | SYSTOLIC BLOOD PRESSURE: 120 MMHG | WEIGHT: 122 LBS | OXYGEN SATURATION: 100 % | DIASTOLIC BLOOD PRESSURE: 85 MMHG | BODY MASS INDEX: 22.45 KG/M2

## 2018-02-22 DIAGNOSIS — I25.5 ISCHEMIC CARDIOMYOPATHY: ICD-10-CM

## 2018-02-22 DIAGNOSIS — Z95.810 ICD (IMPLANTABLE CARDIOVERTER-DEFIBRILLATOR) IN PLACE: Primary | ICD-10-CM

## 2018-02-22 DIAGNOSIS — Q23.2 CONGENITAL STENOSIS OF MITRAL VALVE: ICD-10-CM

## 2018-02-22 DIAGNOSIS — I25.5 ISCHEMIC CARDIOMYOPATHY: Primary | ICD-10-CM

## 2018-02-22 PROCEDURE — 93005 ELECTROCARDIOGRAM TRACING: CPT | Mod: ZF

## 2018-02-22 PROCEDURE — 93010 ELECTROCARDIOGRAM REPORT: CPT | Mod: ZP | Performed by: INTERNAL MEDICINE

## 2018-02-22 PROCEDURE — 99214 OFFICE O/P EST MOD 30 MIN: CPT | Mod: 25 | Performed by: NURSE PRACTITIONER

## 2018-02-22 PROCEDURE — 93290 INTERROG DEV EVAL ICPMS IP: CPT | Mod: 26 | Performed by: INTERNAL MEDICINE

## 2018-02-22 PROCEDURE — 93289 INTERROG DEVICE EVAL HEART: CPT | Mod: 26 | Performed by: INTERNAL MEDICINE

## 2018-02-22 PROCEDURE — 93282 PRGRMG EVAL IMPLANTABLE DFB: CPT | Mod: ZF

## 2018-02-22 PROCEDURE — G0463 HOSPITAL OUTPT CLINIC VISIT: HCPCS | Mod: 25,ZF

## 2018-02-22 RX ORDER — LISINOPRIL 2.5 MG/1
2.5 TABLET ORAL DAILY
Qty: 30 TABLET | Refills: 3 | Status: SHIPPED | OUTPATIENT
Start: 2018-02-22 | End: 2018-03-12

## 2018-02-22 ASSESSMENT — PAIN SCALES - GENERAL: PAINLEVEL: NO PAIN (0)

## 2018-02-22 NOTE — PROGRESS NOTES
Clinical Cardiac Electrophysiology    HPI: Mr. Henao is a 52 year old male who presents for follow up s/p ICD placement. The patient has a past medical history significant for tobacco use, DM type II, and CAD with RCA STEMI s/p PCI (x 7 on 3/27/17 complicated by PEDRO, sepsis, sacral ulcer, DVT, bilateral hemispheric infarcts secondary to watershed ischemia, and cardiogenic shock s/p IABP, later transitioned to a subclavian balloon pump and s/p mitral clip (5/1/17) for ischemic MR).   After 7 months, his LVEF did not recover from 30-35% in 3/2017 despite optimal medical treatment following revascularization. Decision was made to pursue ICD.  ICD single chamber Medtronic implant  on 10/26/2017 for SCD prophylaxis. ICD implant recovery complicated by a pocket hematoma which in the setting of supra therapeutic INRs. The hematoma healed well with no interventions needed.  His ultrasound was negative for DVT and he has been treated for the recommended three months so warfarin was discontinued 1/2018. Echo 12/15/2017 shows moderately reduced systolic function and EF 30-35%.The patient is NYHA Class II. Digoxin was decreased to 125 mcg four times a week on 1/12/2018 due to dig level of 1.      Patient states that never misses doses of medication. States that activity level is moderate, he is able to work and do house work and housework without problems. He states that he has been feeling very well. Sleeps with one pillow under head.  Denies chest pain or pressure, headaches, dizziness, syncope, angina, dyspnea at rest or with exertion, dry cough, palpitations, orthopnea, PND, abdominal pain, abdominal edema, pedal edema, or claudication.     Today s Device check: Normal ICD function.  No arrythmias recorded.  Intrinsic rhythm = NSR @ 81 bpm.   = <0.1%.  OptiVol fluid index is near baseline.  Estimated battery longevity to MOMO = 11.3 years.  No short v-v intervals recorded.  RV lead impedance trends appear  stable.    Today's 12 lead EKG: NSR, vent rate 91 bpm, IN interval 152ms, QRS duration 102ms, QTc 442 ms.    Current Outpatient Prescriptions   Medication Sig Dispense Refill     metoprolol succinate (TOPROL-XL) 50 MG 24 hr tablet Take 1 tablet (50 mg) by mouth daily 90 tablet 3     digoxin (LANOXIN) 125 MCG tablet Take one tablet on M, W, F, Sunday. 60 tablet 5     aspirin 81 MG chewable tablet Take 1 tablet (81 mg) by mouth daily 36 tablet 3     metFORMIN (GLUCOPHAGE-XR) 500 MG 24 hr tablet Take 2 tablets (1,000 mg) by mouth daily (with dinner) 180 tablet 3     glipiZIDE (GLUCOTROL) 5 MG tablet Take 1 tablet (5 mg) by mouth 2 times daily (before meals) 180 tablet 3     Benzoyl Peroxide 2.5 % CREA Externally apply topically daily 21 g 11     tretinoin (RETIN-A) 0.05 % cream Spread a pea size amount into affected area topically at bedtime.  Use sunscreen SPF>20. 45 g 11     potassium chloride SA (K-DUR/KLOR-CON M) 20 MEQ CR tablet Take 1 tablet (20 mEq) by mouth daily 90 tablet 3     bumetanide (BUMEX) 0.5 MG tablet Take 1 tablet (0.5 mg) by mouth every other day 45 tablet 3     lisinopril (PRINIVIL/ZESTRIL) 2.5 MG tablet Take 1 tablet (2.5 mg) by mouth daily 30 tablet 1     ONETOUCH ULTRA test strip USE TO TEST BLOOD SUGAR 3 TIMES DAILY OR AS DIRECTED. 100 strip 3     insulin pen needle (BD KYLEE U/F) 32G X 4 MM Use 3 daily or as directed. 100 each prn     alcohol swab prep pads Use to swab area of injection/mary alice as directed 3 x per day 100 each 11     atorvastatin (LIPITOR) 40 MG tablet 1 tablet (40 mg) by Oral or Feeding Tube route daily 60 tablet 7     clopidogrel (PLAVIX) 75 MG tablet Take 1 tablet (75 mg) by mouth daily 60 tablet 11     finasteride (PROSCAR) 5 MG tablet Take 1 tablet (5 mg) by mouth daily 60 tablet 11     ASPIRIN NOT PRESCRIBED (INTENTIONAL) Please choose reason not prescribed, below 0 each 0     tamsulosin (FLOMAX) 0.4 MG capsule Take 1 capsule (0.4 mg) by mouth daily 60 capsule 0     zinc  sulfate (ZINCATE) 220 (50 ZN) MG capsule Take 1 capsule (220 mg) by mouth daily 2 capsule 0     Sharps Container MISC 1 each every 30 days 1 each 0     Warfarin Therapy Reminder 1 each continuous prn         Past Medical History:   Diagnosis Date     CAD (coronary artery disease) 2017    RCA STEMI s/p balloon angioplasty and multiple Sofie to RCA, LCx, and LAD     Cardiogenic shock (H)      DVT (deep venous thrombosis) (H) 2017    left internal jugular (line-associated); left common femoral; on coumadin     Ischemic cardiomyopathy 2017    EF 30-35%     Ischemic stroke (H) 2017    no residual deficits     Lower GI bleed 2017    due to rectal ulcer     Mitral valve insufficiency, ischemic 2017    s/p Mitral Clip placement      Skin ulcer of sacrum (H)      Systolic heart failure (H)      Type 2 diabetes mellitus (H)        Past Surgical History:   Procedure Laterality Date     C INSERT ELECTRD LEADS/REPOSTION  10/27/2017          COLONOSCOPY N/A 4/17/2017    Procedure: COLONOSCOPY;  Surgeon: Rashaad Bundy MD;  Location: [facility]     INSERT INTRAAORTIC BALLOON PUMP Right 4/19/2017    Procedure: INSERT INTRAAORTIC BALLOON PUMP;  Right Subclavian Intra Aortic Balloon Pump Insertion using Maquet 40cc Ballon Catheter, Implentation of 8mm Gelweave Woven Vascular Prosthesis, Removal of Left Femoral Ballon Pump Catheter, Flouroscopy;  Surgeon: Keshav Leung MD;  Location: [facility]     PERCUTANEOUS MITRAL VALVE REPAIR N/A 5/1/2017    Procedure: PERCUTANEOUS MITRAL VALVE REPAIR ANESTHESIA;  Mitraclip Procedure Possible Cardiopulmonary Bypass ;  Surgeon: Ron Cortez MD;  Location: [facility]     SUBCLAVIAN AORTIC VALVE IMPLANT N/A 5/8/2017    Procedure: SUBCLAVIAN AORTIC VALVE IMPLANT;  Right Subclavian Graft Removal ;  Surgeon: Keshav Leung MD;  Location: [facility]       Family History   Problem Relation Age of Onset     Hypertension Mother      Type 2 Diabetes Father      Hypertension Father       "Myocardial Infarction No family hx of      CEREBROVASCULAR DISEASE No family hx of      Coronary Artery Disease Early Onset No family hx of      Colon Cancer No family hx of      Prostate Cancer No family hx of        Social History   Substance Use Topics     Smoking status: Former Smoker     Packs/day: 0.50     Years: 30.00     Types: Cigarettes     Quit date: 1/1/2017     Smokeless tobacco: Never Used     Alcohol use No       No Known Allergies      ROS:   Negative except for as indicated in HPI.    Physical Examination:  Vitals: Ht 1.575 m (5' 2\")  Wt 55.3 kg (122 lb)  BMI 22.31 kg/m2  BMI= Body mass index is 22.31 kg/(m^2).    GENERAL APPEARANCE: healthy, alert, and no acute distress  HEENT: no icterus, no xanthelasmas, normal pupil size and reaction, no cyanosis.  NECK: no asymmetry, thyroid normal to palpation, carotid upstrokes are normal bilaterally, no cervical bruits, no JVD   CHEST: lungs clear to auscultation - no rales, rhonchi or wheezes, no use of accessory muscles, no retractions, respirations are unlabored, normal respiratory rate  CARDIOVASCULAR: regular rhythm, normal S1 with physiologic split S2, no S3 or S4 and no murmur, click or rub, precordium quiet with normal PMI.  ABDOMEN: soft, non-tender, without hepatosplenomegaly, no masses palpable, bowel sounds normal, aorta not enlarged by palpation, no abdominal bruits  EXTREMITIES: no clubbing, cyanosis, or edema  NEURO: alert and oriented to person/place/time, normal speech, gait and affect  VASC: Radial, dorsalis pedis, and posterior tibialis pulses +2 and symmetric bilaterally.   SKIN: no ecchymoses, no rashes    Laboratory:  Cholesterol (mg/dL)   Date Value   03/27/2017 128     HDL Cholesterol (mg/dL)   Date Value   03/27/2017 36 (L)     LDL Cholesterol Calculated (mg/dL)   Date Value   03/27/2017 77   Echocardiogram 12/15/2018:   Interpretation Summary   Moderately reduced systolic function. EF 30-35%. Global hypokinesis with akinesis of " the basal-mid inferior and basal-mid inferolateral walls. Global right ventricular function is mildly reduced. Status post transcatheter mitral valve repair with Mitraclip. The clip is   attached to the leaflets. There is trace to mild mitral regurgitation. Mean gradient 5 mmHg at heart rate 97 bpm. Pulmonary hypertension with right ventricular systolic pressure 41mmHg above the right atrial pressure present. No pericardial effusion.     Assessment and recommendations:    DEVICE: Normal function. Well healed. Continue follow ups as scheduled with Device RNs.    Chronic systolic heart failure secondary to ICM:     Stage C, NYHA Class II   ACEi/ARB: Continue Lisinopril 2.5 mg po daily, prior reduction due to renal function. He was completely out so refill ordered today.  BB: Continue Toprol XL 50 mg po daily   Aldosterone antagonist: contraindicated due to transient renal function   SCD prophylaxis: ICD.   Fluid status: Euvolemic.   Continue digoxin 125 mcg 4 days/week, keep appointment for lab draw 2/28/2018   Keep follow up with HF clinic as scheduled in March.      CAD: continue ASA, Statin, BB, and Plavix.     FOLLOW UP: 1 year or follow up sooner as needed for device discharge, problems, or symptoms.     Patient expresses understanding and agreement with the plan.    I appreciate the chance to help with [facility] care. Please let me know if you have any questions or concerns.    Viki VENEGAS NP-C

## 2018-02-22 NOTE — NURSING NOTE
Chief Complaint   Patient presents with     Follow Up For     51 yo male h/o ICM, CAD with a RCA STEMI s/p PCI x 7 to RCA, left circumflex (now ), and LAD on 3/27/17 complicated by PEDRO, sepsis, sacral ulcer, DVT, bilateral hemispheric infarcts secondary to watershed ischemia, and cardiogenic shock s/p IABP, later transitioned to a subclavian balloon pump and s/p mitral clip for ischemic MR present for MR- EKG for Mitral valve regurgitation     Vitals were taken and medications were reconciled. EKG was performed    Brit ALDANA  12:18 PM

## 2018-02-22 NOTE — LETTER
2/22/2018      RE: Luke Henao  [address]  [facility] [address]       Dear Colleague,    Thank you for the opportunity to participate in the care of your patient, Luke Henao, at the [facility]. Please see a copy of my visit note below.          Clinical Cardiac Electrophysiology    HPI: Mr. Henao is a 52 year old male who presents for follow up s/p ICD placement. The patient has a past medical history significant for tobacco use, DM type II, and CAD with RCA STEMI s/p PCI (x 7 on 3/27/17 complicated by PEDRO, sepsis, sacral ulcer, DVT, bilateral hemispheric infarcts secondary to watershed ischemia, and cardiogenic shock s/p IABP, later transitioned to a subclavian balloon pump and s/p mitral clip (5/1/17) for ischemic MR).   After 7 months, his LVEF did not recover from 30-35% in 3/2017 despite optimal medical treatment following revascularization. Decision was made to pursue ICD.  ICD single chamber Medtronic implant  on 10/26/2017 for SCD prophylaxis. ICD implant recovery complicated by a pocket hematoma which in the setting of supra therapeutic INRs. The hematoma healed well with no interventions needed.  His ultrasound was negative for DVT and he has been treated for the recommended three months so warfarin was discontinued 1/2018. Echo 12/15/2017 shows moderately reduced systolic function and EF 30-35%.The patient is NYHA Class II. Digoxin was decreased to 125 mcg four times a week on 1/12/2018 due to dig level of 1.      Patient states that never misses doses of medication. States that activity level is moderate, he is able to work and do house work and housework without problems. He states that he has been feeling very well. Sleeps with one pillow under head.  Denies chest pain or pressure, headaches, dizziness, syncope, angina, dyspnea at rest or with exertion, dry cough, palpitations, orthopnea, PND, abdominal pain, abdominal edema, pedal  edema, or claudication.     Today s Device check: Normal ICD function.  No arrythmias recorded.  Intrinsic rhythm = NSR @ 81 bpm.   = <0.1%.  OptiVol fluid index is near baseline.  Estimated battery longevity to MOMO = 11.3 years.  No short v-v intervals recorded.  RV lead impedance trends appear stable.    Today's 12 lead EKG: NSR, vent rate 91 bpm, [place] interval 152ms, QRS duration 102ms, QTc 442 ms.    Current Outpatient Prescriptions   Medication Sig Dispense Refill     metoprolol succinate (TOPROL-XL) 50 MG 24 hr tablet Take 1 tablet (50 mg) by mouth daily 90 tablet 3     digoxin (LANOXIN) 125 MCG tablet Take one tablet on M, W, F, Sunday. 60 tablet 5     aspirin 81 MG chewable tablet Take 1 tablet (81 mg) by mouth daily 36 tablet 3     metFORMIN (GLUCOPHAGE-XR) 500 MG 24 hr tablet Take 2 tablets (1,000 mg) by mouth daily (with dinner) 180 tablet 3     glipiZIDE (GLUCOTROL) 5 MG tablet Take 1 tablet (5 mg) by mouth 2 times daily (before meals) 180 tablet 3     Benzoyl Peroxide 2.5 % CREA Externally apply topically daily 21 g 11     tretinoin (RETIN-A) 0.05 % cream Spread a pea size amount into affected area topically at bedtime.  Use sunscreen SPF>20. 45 g 11     potassium chloride SA (K-DUR/KLOR-CON M) 20 MEQ CR tablet Take 1 tablet (20 mEq) by mouth daily 90 tablet 3     bumetanide (BUMEX) 0.5 MG tablet Take 1 tablet (0.5 mg) by mouth every other day 45 tablet 3     lisinopril (PRINIVIL/ZESTRIL) 2.5 MG tablet Take 1 tablet (2.5 mg) by mouth daily 30 tablet 1     ONETOUCH ULTRA test strip USE TO TEST BLOOD SUGAR 3 TIMES DAILY OR AS DIRECTED. 100 strip 3     insulin pen needle (BD KYLEE U/F) 32G X 4 MM Use 3 daily or as directed. 100 each prn     alcohol swab prep pads Use to swab area of injection/mary alice as directed 3 x per day 100 each 11     atorvastatin (LIPITOR) 40 MG tablet 1 tablet (40 mg) by Oral or Feeding Tube route daily 60 tablet 7     clopidogrel (PLAVIX) 75 MG tablet Take 1 tablet (75 mg) by mouth  daily 60 tablet 11     finasteride (PROSCAR) 5 MG tablet Take 1 tablet (5 mg) by mouth daily 60 tablet 11     ASPIRIN NOT PRESCRIBED (INTENTIONAL) Please choose reason not prescribed, below 0 each 0     tamsulosin (FLOMAX) 0.4 MG capsule Take 1 capsule (0.4 mg) by mouth daily 60 capsule 0     zinc sulfate (ZINCATE) 220 (50 ZN) MG capsule Take 1 capsule (220 mg) by mouth daily 2 capsule 0     Sharps Container MISC 1 each every 30 days 1 each 0     Warfarin Therapy Reminder 1 each continuous prn         Past Medical History:   Diagnosis Date     CAD (coronary artery disease) 2017    RCA STEMI s/p balloon angioplasty and multiple Sofie to RCA, LCx, and LAD     Cardiogenic shock (H)      DVT (deep venous thrombosis) (H) 2017    left internal jugular (line-associated); left common femoral; on coumadin     Ischemic cardiomyopathy 2017    EF 30-35%     Ischemic stroke (H) 2017    no residual deficits     Lower GI bleed 2017    due to rectal ulcer     Mitral valve insufficiency, ischemic 2017    s/p Mitral Clip placement      Skin ulcer of sacrum (H)      Systolic heart failure (H)      Type 2 diabetes mellitus (H)        Past Surgical History:   Procedure Laterality Date     C INSERT ELECTRD LEADS/REPOSTION  10/27/2017          COLONOSCOPY N/A 4/17/2017    Procedure: COLONOSCOPY;  Surgeon: Rashaad Bundy MD;  Location: [facility]     INSERT INTRAAORTIC BALLOON PUMP Right 4/19/2017    Procedure: INSERT INTRAAORTIC BALLOON PUMP;  Right Subclavian Intra Aortic Balloon Pump Insertion using Maquet 40cc Ballon Catheter, Implentation of 8mm Gelweave Woven Vascular Prosthesis, Removal of Left Femoral Ballon Pump Catheter, Flouroscopy;  Surgeon: Keshav Leung MD;  Location: [facility]     PERCUTANEOUS MITRAL VALVE REPAIR N/A 5/1/2017    Procedure: PERCUTANEOUS MITRAL VALVE REPAIR ANESTHESIA;  Mitraclip Procedure Possible Cardiopulmonary Bypass ;  Surgeon: Ron Cortez MD;  Location: [facility]     SUBCLAVIAN AORTIC  "VALVE IMPLANT N/A 5/8/2017    Procedure: SUBCLAVIAN AORTIC VALVE IMPLANT;  Right Subclavian Graft Removal ;  Surgeon: Keshav Leung MD;  Location:  OR       Family History   Problem Relation Age of Onset     Hypertension Mother      Type 2 Diabetes Father      Hypertension Father      Myocardial Infarction No family hx of      CEREBROVASCULAR DISEASE No family hx of      Coronary Artery Disease Early Onset No family hx of      Colon Cancer No family hx of      Prostate Cancer No family hx of        Social History   Substance Use Topics     Smoking status: Former Smoker     Packs/day: 0.50     Years: 30.00     Types: Cigarettes     Quit date: 1/1/2017     Smokeless tobacco: Never Used     Alcohol use No       No Known Allergies      ROS:   Negative except for as indicated in HPI.    Physical Examination:  Vitals: Ht 1.575 m (5' 2\")  Wt 55.3 kg (122 lb)  BMI 22.31 kg/m2  BMI= Body mass index is 22.31 kg/(m^2).    GENERAL APPEARANCE: healthy, alert, and no acute distress  HEENT: no icterus, no xanthelasmas, normal pupil size and reaction, no cyanosis.  NECK: no asymmetry, thyroid normal to palpation, carotid upstrokes are normal bilaterally, no cervical bruits, no JVD   CHEST: lungs clear to auscultation - no rales, rhonchi or wheezes, no use of accessory muscles, no retractions, respirations are unlabored, normal respiratory rate  CARDIOVASCULAR: regular rhythm, normal S1 with physiologic split S2, no S3 or S4 and no murmur, click or rub, precordium quiet with normal PMI.  ABDOMEN: soft, non-tender, without hepatosplenomegaly, no masses palpable, bowel sounds normal, aorta not enlarged by palpation, no abdominal bruits  EXTREMITIES: no clubbing, cyanosis, or edema  NEURO: alert and oriented to person/place/time, normal speech, gait and affect  VASC: Radial, dorsalis pedis, and posterior tibialis pulses +2 and symmetric bilaterally.   SKIN: no ecchymoses, no rashes    Laboratory:  Cholesterol (mg/dL) "   Date Value   03/27/2017 128     HDL Cholesterol (mg/dL)   Date Value   03/27/2017 36 (L)     LDL Cholesterol Calculated (mg/dL)   Date Value   03/27/2017 77   Echocardiogram 12/15/2018:   Interpretation Summary   Moderately reduced systolic function. EF 30-35%. Global hypokinesis with akinesis of the basal-mid inferior and basal-mid inferolateral walls. Global right ventricular function is mildly reduced. Status post transcatheter mitral valve repair with Mitraclip. The clip is   attached to the leaflets. There is trace to mild mitral regurgitation. Mean gradient 5 mmHg at heart rate 97 bpm. Pulmonary hypertension with right ventricular systolic pressure 41mmHg above the right atrial pressure present. No pericardial effusion.     Assessment and recommendations:    DEVICE: Normal function. Well healed. Continue follow ups as scheduled with Device RNs.    Chronic systolic heart failure secondary to ICM:     Stage C, NYHA Class II   ACEi/ARB: Continue Lisinopril 2.5 mg po daily, prior reduction due to renal function. He was completely out so refill ordered today.  BB: Continue Toprol XL 50 mg po daily   Aldosterone antagonist: contraindicated due to transient renal function   SCD prophylaxis: ICD.   Fluid status: Euvolemic.   Continue digoxin 125 mcg 4 days/week, keep appointment for lab draw 2/28/2018   Keep follow up with HF clinic as scheduled in March.      CAD: continue ASA, Statin, BB, and Plavix.     FOLLOW UP: 1 year or follow up sooner as needed for device discharge, problems, or symptoms.     Patient expresses understanding and agreement with the plan.    I appreciate the chance to help with [facility] care. Please let me know if you have any questions or concerns.    Viki VENEGAS, BERLIN-C

## 2018-02-22 NOTE — PROGRESS NOTES
Preliminary Device Interrogation Results.  Final physician signed paceart report to be scanned and attached.    Patient seen in clinic for evaluation and iterative programming of his Medtronic single lead ICD per MD orders.  Patient has an appointment to see Viki Grant NP today.  Normal ICD function.  No arrythmias recorded.  Intrinsic rhythm = NSR @ 81 bpm.   = <0.1%.  OptiVol fluid index is near baseline.  Estimated battery longevity to MOMO = 11.3 years.  No short v-v intervals recorded.  RV lead impedance trends appear stable.  Patient reports that he is feeling well and denies complaints.  Plan for patient to send a remote transmission in 3 months and return to clinic in 6 months.    Single lead ICD

## 2018-02-22 NOTE — PATIENT INSTRUCTIONS
It was a pleasure to see you in clinic today.  Please do not hesitate to call with any questions or concerns.  You are scheduled for a remote transmission on 5/29/18.  We look forward to seeing you in clinic at your next device check in 6 months.    Sade Artis, RN, MS, CCRN  Electrophysiology Nurse Clinician  [facility]    During Business Hours Please Call:  [phone]  After Hours Please Call:  [phone] - select option #4 and ask for job code [id number]

## 2018-02-22 NOTE — MR AVS SNAPSHOT
After Visit Summary   2/22/2018    Luke Henao    MRN: [id number]           Patient Information     Date Of Birth          1965        Visit Information        Provider Department      2/22/2018 11:45 AM Viki Grant APRN CNP; [employer] [facility]        Today's Diagnoses     ICD (implantable cardioverter-defibrillator) in place    -  1    Congenital stenosis of mitral valve        Ischemic cardiomyopathy          Care Instructions    Cardiology Provider you saw in clinic today: Viki VENEGAS, NP-C    Medication Changes:  No changes today     Follow-up Visit:  1 year     You will receive all normal lab and testing results via MyChart or mail if not reviewed in clinic today. Please contact our office if you need assistance with setting up MyChart.    If you need a medication refill please contact your pharmacy. Please allow 3 business days for your refill to be completed.     As always, thank you for trusting us with your health care needs!    If you have any questions regarding your visit please contact your care team:   Cardiology  Telephone Number    EP RN  Electrophysiology Nurse Coordinator [phone]     Call for EP procedure scheduling concerns  MONO Wilson  [phone]           Device Clinic (Pacemakers, ICDs, Loop)   RN's : Bertha Sykes Connie, Dawn During business hours: [phone]    After business hours:   [phone]- select option 4 and ask for job code [phone].                Follow-ups after your visit        Additional Services     Follow-Up with EP Cardiac Advanced Practice Provider- 1 Year                 Your next 10 appointments already scheduled     Feb 28, 2018  9:30 AM CST   Lab with  LAB   [facility] ([facility] [place] [facility])    [address] [place] [address] [phone]            Feb 28, 2018 10:00 AM CST   (Arrive by 9:45 AM)   New Patient Visit with Amanda  MD Cristin   [facility] ([facility])    [address]  [address]  [facility] [address]   [phone]            Mar 12, 2018 11:30 AM CDT   Lab with UC LAB   [facility] ([facility])    [address] [place] [address]  1st Floor  [facility] [address]   [phone]            Mar 12, 2018 12:00 PM CDT   (Arrive by 11:45 AM)   CORE RETURN with Vandana Zambrano NP   [facility] ([facility])    [address]  [address]  [facility] [address]   [phone]            Aug 24, 2018 11:00 AM CDT   (Arrive by 10:45 AM)   Implanted Defibulator with Uc Cv Device 1   [facility] ([facility])    [address]  [address]  [facility] [address]   [phone]              Future tests that were ordered for you today     Open Future Orders        Priority Expected Expires Ordered    Follow-Up with EP Cardiac Advanced Practice Provider- 1 Year Routine 2/1/2019 3/29/2019 2/22/2018    Follow-Up with Device Clinic-6 months Routine 8/21/2018 11/19/2018 2/22/2018            Who to contact     If you have questions or need follow up information about today's clinic visit or your schedule please contact [facility] directly at [phone].  Normal or non-critical lab and imaging results will be communicated to you by [employer], letter or phone within 4 business days after the clinic has received the results. If you do not hear from us within 7 days, please contact the clinic through [employer] or phone. If you have a critical or abnormal lab result, we will notify you by phone as soon as possible.  Submit refill requests through [employer] or call your pharmacy and they will forward the refill request to us. Please allow 3 business days for your refill to be completed.          Additional Information About Your Visit        [employer] Information     [employer] lets you send messages  "to your doctor, view your test results, renew your prescriptions, schedule appointments and more. To sign up, go to [email or web address] . Click on \"Log in\" on the left side of the screen, which will take you to the Welcome page. Then click on \"Sign up Now\" on the right side of the page.     You will be asked to enter the access code listed below, as well as some personal information. Please follow the directions to create your username and password.     Your access code is: [email or web address]  Expires: 4/3/2018  3:45 PM     Your access code will  in 90 days. If you need help or a new code, please call your [place] clinic or [phone].        Care EveryWhere ID     This is your Care EveryWhere ID. This could be used by other organizations to access your [place] medical records  [id number]        Your Vitals Were     Pulse Height Pulse Oximetry BMI (Body Mass Index)          107 1.575 m (5' 2\") 100% 22.31 kg/m2         Blood Pressure from Last 3 Encounters:   18 120/85   18 (!) 141/93   17 102/60    Weight from Last 3 Encounters:   18 55.3 kg (122 lb)   18 55.2 kg (121 lb 11.2 oz)   17 56.4 kg (124 lb 4.8 oz)              We Performed the Following     EKG 12-lead, tracing only (Same Day)          Where to get your medicines      These medications were sent to [facility]/pharmacy #[id number] [place], [place]  [address] [address]     Phone:  [phone]     lisinopril 2.5 MG tablet          Primary Care Provider Office Phone # Fax #    Shanna Church [phone]       [address] [facility] [address]        Equal Access to Services     CUBA AYALA : Enedelia Justin, wamaria t paniagua, qaybta karen michael. So [facility] [phone].    ATENCIÓN: Si habla español, tiene a suarez disposición servicios gratuitos de asistencia lingüística. Llame al [phone].    We " comply with applicable federal civil rights laws and [place] laws. We do not discriminate on the basis of race, color, national origin, age, disability, sex, sexual orientation, or gender identity.            Thank you!     Thank you for choosing [facility]  for your care. Our goal is always to provide you with excellent care. Hearing back from our patients is one way we can continue to improve our services. Please take a few minutes to complete the written survey that you may receive in the mail after your visit with us. Thank you!             Your Updated Medication List - Protect others around you: Learn how to safely use, store and throw away your medicines at www.disposemymeds.org.          This list is accurate as of 2/22/18 12:41 PM.  Always use your most recent med list.                   Brand Name Dispense Instructions for use Diagnosis    alcohol swab prep pads     100 each    Use to swab area of injection/mary alice as directed 3 x per day        aspirin 81 MG chewable tablet     36 tablet    Take 1 tablet (81 mg) by mouth daily    Coronary artery disease involving native coronary artery of native heart without angina pectoris       ASPIRIN NOT PRESCRIBED    INTENTIONAL    0 each    Please choose reason not prescribed, below    Cardiogenic shock (H), Type 2 diabetes mellitus without complication, with long-term current use of insulin (H)       atorvastatin 40 MG tablet    LIPITOR    60 tablet    1 tablet (40 mg) by Oral or Feeding Tube route daily    Coronary artery disease involving native coronary artery of native heart without angina pectoris, Cardiogenic shock (H)       Benzoyl Peroxide 2.5 % Crea     21 g    Externally apply topically daily    Acne vulgaris       bumetanide 0.5 MG tablet    BUMEX    45 tablet    Take 1 tablet (0.5 mg) by mouth every other day    Ischemic cardiomyopathy       clopidogrel 75 MG tablet    PLAVIX    60 tablet    Take 1 tablet (75 mg) by mouth daily    ST elevation  myocardial infarction (STEMI), unspecified artery (H)       digoxin 125 MCG tablet    LANOXIN    60 tablet    Take one tablet on M, W, F, Sunday.    Coronary artery disease involving native coronary artery of native heart without angina pectoris       finasteride 5 MG tablet    PROSCAR    60 tablet    Take 1 tablet (5 mg) by mouth daily    Hypertrophy of prostate with urinary obstruction       glipiZIDE 5 MG tablet    GLUCOTROL    180 tablet    Take 1 tablet (5 mg) by mouth 2 times daily (before meals)    Type 2 diabetes mellitus without complication, without long-term current use of insulin (H)       insulin pen needle 32G X 4 MM    BD KYLEE U/F    100 each    Use 3 daily or as directed.    Diabetes mellitus type 2, insulin dependent (H)       lisinopril 2.5 MG tablet    PRINIVIL/Zestril    30 tablet    Take 1 tablet (2.5 mg) by mouth daily    Ischemic cardiomyopathy       metFORMIN 500 MG 24 hr tablet    GLUCOPHAGE-XR    180 tablet    Take 2 tablets (1,000 mg) by mouth daily (with dinner)    Type 2 diabetes mellitus without complication, without long-term current use of insulin (H)       metoprolol succinate 50 MG 24 hr tablet    TOPROL-XL    90 tablet    Take 1 tablet (50 mg) by mouth daily    Chronic systolic congestive heart failure (H)       ONETOUCH ULTRA test strip   Generic drug:  blood glucose monitoring     100 strip    USE TO TEST BLOOD SUGAR 3 TIMES DAILY OR AS DIRECTED.    Diabetes mellitus type 2, insulin dependent (H)       potassium chloride SA 20 MEQ CR tablet    K-DUR/KLOR-CON M    90 tablet    Take 1 tablet (20 mEq) by mouth daily    Ischemic cardiomyopathy       Sharps Container Misc     1 each    1 each every 30 days    Diabetes mellitus type 2, insulin dependent (H)       tamsulosin 0.4 MG capsule    FLOMAX    60 capsule    Take 1 capsule (0.4 mg) by mouth daily    Ischemic cardiomyopathy       tretinoin 0.05 % cream    RETIN-A    45 g    Spread a pea size amount into affected area topically at  bedtime.  Use sunscreen SPF>20.    Acne vulgaris       Warfarin Therapy Reminder      1 each continuous prn    Deep vein thrombosis (DVT) of left lower extremity, unspecified chronicity, unspecified vein (H)       zinc sulfate 220 (50 ZN) MG capsule    ZINCATE    2 capsule    Take 1 capsule (220 mg) by mouth daily    Ischemic cardiomyopathy

## 2018-02-22 NOTE — MR AVS SNAPSHOT
After Visit Summary   2/22/2018    Luke Henao    MRN: [id number]           Patient Information     Date Of Birth          1965        Visit Information        Provider Department      2/22/2018 11:15 AM 1Silas Cv Device; [employer] [facility]        Today's Diagnoses     Ischemic cardiomyopathy    -  1      Care Instructions    It was a pleasure to see you in clinic today.  Please do not hesitate to call with any questions or concerns.  You are scheduled for a remote transmission on 5/29/18.  We look forward to seeing you in clinic at your next device check in 6 months.    Sade Artis, RN, MS, CCRN  Electrophysiology Nurse Clinician  [facility]    During Business Hours Please Call:  [phone]  After Hours Please Call:  [phone] - select option #4 and ask for job code [phone]                        Follow-ups after your visit        Additional Services     Follow-Up with Device Clinic-6 months                 Your next 10 appointments already scheduled     Feb 28, 2018  9:30 AM CST   Lab with  LAB   [facility] ([facility])    [address] [place] [address]   [phone]            Feb 28, 2018 10:00 AM CST   (Arrive by 9:45 AM)   New Patient Visit with Amanda Amaral MD   [facility] ([facility])    [address] [place] [address]  [address] [place] [id number] [phone]            Mar 12, 2018 11:30 AM CDT   Lab with  LAB   [facility] ([facility])    [address] [place] [address] [phone]            Mar 12, 2018 12:00 PM CDT   (Arrive by 11:45 AM)   CORE RETURN with Vandana Zambrano NP   [facility] ([facility])    [address] [place] [address]  [address] [place] [id number] [phone]              Future tests that were ordered for you today      "Open Future Orders        Priority Expected Expires Ordered    Follow-Up with Device Clinic-6 months Routine 2018            Who to contact     If you have questions or need follow up information about today's clinic visit or your schedule please contact [facility] directly at [phone].  Normal or non-critical lab and imaging results will be communicated to you by MyChart, letter or phone within 4 business days after the clinic has received the results. If you do not hear from us within 7 days, please contact the clinic through [facility] or phone. If you have a critical or abnormal lab result, we will notify you by phone as soon as possible.  Submit refill requests through [employer] or call your pharmacy and they will forward the refill request to us. Please allow 3 business days for your refill to be completed.          Additional Information About Your Visit        MyChart Information     [employer] lets you send messages to your doctor, view your test results, renew your prescriptions, schedule appointments and more. To sign up, go to [email or web address] . Click on \"Log in\" on the left side of the screen, which will take you to the Welcome page. Then click on \"Sign up Now\" on the right side of the page.     You will be asked to enter the access code listed below, as well as some personal information. Please follow the directions to create your username and password.     Your access code is: [email or web address]  Expires: 4/3/2018  3:45 PM     Your access code will  in 90 days. If you need help or a new code, please call your [place] clinic or [phone].        Care EveryWhere ID     This is your Care EveryWhere ID. This could be used by other organizations to access your [place] medical records  [id number]         Blood Pressure from Last 3 Encounters:   18 120/85   18 (!) 141/93   17 102/60    Weight from Last 3 Encounters:   18 55.3 kg (122 lb) "   01/12/18 55.2 kg (121 lb 11.2 oz)   12/22/17 56.4 kg (124 lb 4.8 oz)              We Performed the Following     ICD DEVICE PROGRAMMING EVAL, SINGLE LEAD ICD        Primary Care Provider Office Phone # Fax #    Shanna Church [phone]       [address]  [facility] [address]        Equal Access to Services     DALTON AYALA : Hadii aad ku hadasho Soomaali, waaxda luqadaha, qaybta kaalmada adeegyada, waxay idiin hayaan adeeg kharash la'aan ah. So [facility] [phone].    ATENCIÓN: Si habernie solis, tiene a suarez disposición servicios gratuitos de asistencia lingüística. Llame al [phone].    We comply with applicable federal civil rights laws and [place] laws. We do not discriminate on the basis of race, color, national origin, age, disability, sex, sexual orientation, or gender identity.            Thank you!     Thank you for choosing [facility]  for your care. Our goal is always to provide you with excellent care. Hearing back from our patients is one way we can continue to improve our services. Please take a few minutes to complete the written survey that you may receive in the mail after your visit with us. Thank you!             Your Updated Medication List - Protect others around you: Learn how to safely use, store and throw away your medicines at www.disposemymeds.org.          This list is accurate as of 2/22/18 12:24 PM.  Always use your most recent med list.                   Brand Name Dispense Instructions for use Diagnosis    alcohol swab prep pads     100 each    Use to swab area of injection/mary alice as directed 3 x per day        aspirin 81 MG chewable tablet     36 tablet    Take 1 tablet (81 mg) by mouth daily    Coronary artery disease involving native coronary artery of native heart without angina pectoris       ASPIRIN NOT PRESCRIBED    INTENTIONAL    0 each    Please choose reason not prescribed, below    Cardiogenic shock (H), Type 2 diabetes mellitus without  complication, with long-term current use of insulin (H)       atorvastatin 40 MG tablet    LIPITOR    60 tablet    1 tablet (40 mg) by Oral or Feeding Tube route daily    Coronary artery disease involving native coronary artery of native heart without angina pectoris, Cardiogenic shock (H)       Benzoyl Peroxide 2.5 % Crea     21 g    Externally apply topically daily    Acne vulgaris       bumetanide 0.5 MG tablet    BUMEX    45 tablet    Take 1 tablet (0.5 mg) by mouth every other day    Ischemic cardiomyopathy       clopidogrel 75 MG tablet    PLAVIX    60 tablet    Take 1 tablet (75 mg) by mouth daily    ST elevation myocardial infarction (STEMI), unspecified artery (H)       digoxin 125 MCG tablet    LANOXIN    60 tablet    Take one tablet on M, W, F, Sunday.    Coronary artery disease involving native coronary artery of native heart without angina pectoris       finasteride 5 MG tablet    PROSCAR    60 tablet    Take 1 tablet (5 mg) by mouth daily    Hypertrophy of prostate with urinary obstruction       glipiZIDE 5 MG tablet    GLUCOTROL    180 tablet    Take 1 tablet (5 mg) by mouth 2 times daily (before meals)    Type 2 diabetes mellitus without complication, without long-term current use of insulin (H)       insulin pen needle 32G X 4 MM    BD KYLEE U/F    100 each    Use 3 daily or as directed.    Diabetes mellitus type 2, insulin dependent (H)       lisinopril 2.5 MG tablet    PRINIVIL/Zestril    30 tablet    Take 1 tablet (2.5 mg) by mouth daily    Ischemic cardiomyopathy       metFORMIN 500 MG 24 hr tablet    GLUCOPHAGE-XR    180 tablet    Take 2 tablets (1,000 mg) by mouth daily (with dinner)    Type 2 diabetes mellitus without complication, without long-term current use of insulin (H)       metoprolol succinate 50 MG 24 hr tablet    TOPROL-XL    90 tablet    Take 1 tablet (50 mg) by mouth daily    Chronic systolic congestive heart failure (H)       ONETOUCH ULTRA test strip   Generic drug:  blood  glucose monitoring     100 strip    USE TO TEST BLOOD SUGAR 3 TIMES DAILY OR AS DIRECTED.    Diabetes mellitus type 2, insulin dependent (H)       potassium chloride SA 20 MEQ CR tablet    K-DUR/KLOR-CON M    90 tablet    Take 1 tablet (20 mEq) by mouth daily    Ischemic cardiomyopathy       Sharps Container Misc     1 each    1 each every 30 days    Diabetes mellitus type 2, insulin dependent (H)       tamsulosin 0.4 MG capsule    FLOMAX    60 capsule    Take 1 capsule (0.4 mg) by mouth daily    Ischemic cardiomyopathy       tretinoin 0.05 % cream    RETIN-A    45 g    Spread a pea size amount into affected area topically at bedtime.  Use sunscreen SPF>20.    Acne vulgaris       Warfarin Therapy Reminder      1 each continuous prn    Deep vein thrombosis (DVT) of left lower extremity, unspecified chronicity, unspecified vein (H)       zinc sulfate 220 (50 ZN) MG capsule    ZINCATE    2 capsule    Take 1 capsule (220 mg) by mouth daily    Ischemic cardiomyopathy

## 2018-02-22 NOTE — PATIENT INSTRUCTIONS
Cardiology Provider you saw in clinic today: Viki VENEGAS NP-C    Medication Changes:  No changes today     Follow-up Visit:  1 year     You will receive all normal lab and testing results via [employer] or mail if not reviewed in clinic today. Please contact our office if you need assistance with setting up MyChart.    If you need a medication refill please contact your pharmacy. Please allow 3 business days for your refill to be completed.     As always, thank you for trusting us with your health care needs!    If you have any questions regarding your visit please contact your care team:   Cardiology  Telephone Number    EP RN  Electrophysiology Nurse Coordinator [phone]     Call for EP procedure scheduling concerns  MONO Wilson  [phone]           Device Clinic (Pacemakers, ICDs, Loop)   RN's : Bertha Sykes Connie, Dawn During business hours: [phone]    After business hours:   [phone]- select option 4 and ask for job code [phone].

## 2018-02-23 LAB — INTERPRETATION ECG - MUSE: NORMAL

## 2018-02-28 ENCOUNTER — OFFICE VISIT (OUTPATIENT)
Dept: CARDIOLOGY | Facility: CLINIC | Age: 53
End: 2018-02-28
Attending: INTERNAL MEDICINE
Payer: COMMERCIAL

## 2018-02-28 VITALS
BODY MASS INDEX: 22.45 KG/M2 | HEART RATE: 90 BPM | DIASTOLIC BLOOD PRESSURE: 80 MMHG | OXYGEN SATURATION: 100 % | HEIGHT: 62 IN | SYSTOLIC BLOOD PRESSURE: 116 MMHG | WEIGHT: 122 LBS

## 2018-02-28 DIAGNOSIS — I25.10 CORONARY ARTERY DISEASE INVOLVING NATIVE HEART WITHOUT ANGINA PECTORIS, UNSPECIFIED VESSEL OR LESION TYPE: ICD-10-CM

## 2018-02-28 DIAGNOSIS — Z95.5 S/P CORONARY ARTERY STENT PLACEMENT: ICD-10-CM

## 2018-02-28 DIAGNOSIS — I34.0 NON-RHEUMATIC MITRAL REGURGITATION: ICD-10-CM

## 2018-02-28 DIAGNOSIS — Z98.890 HISTORY OF MITRAL VALVE REPAIR: Primary | ICD-10-CM

## 2018-02-28 DIAGNOSIS — I25.5 ISCHEMIC CARDIOMYOPATHY: ICD-10-CM

## 2018-02-28 DIAGNOSIS — I50.22 CHRONIC SYSTOLIC CONGESTIVE HEART FAILURE (H): ICD-10-CM

## 2018-02-28 LAB
ANION GAP SERPL CALCULATED.3IONS-SCNC: 10 MMOL/L (ref 3–14)
BUN SERPL-MCNC: 26 MG/DL (ref 7–30)
CALCIUM SERPL-MCNC: 8.7 MG/DL (ref 8.5–10.1)
CHLORIDE SERPL-SCNC: 108 MMOL/L (ref 94–109)
CO2 SERPL-SCNC: 25 MMOL/L (ref 20–32)
CREAT SERPL-MCNC: 1.17 MG/DL (ref 0.66–1.25)
DIGOXIN SERPL-MCNC: 0.8 UG/L (ref 0.5–2)
GFR SERPL CREATININE-BSD FRML MDRD: 66 ML/MIN/1.7M2
GLUCOSE SERPL-MCNC: 134 MG/DL (ref 70–99)
POTASSIUM SERPL-SCNC: 4.8 MMOL/L (ref 3.4–5.3)
SODIUM SERPL-SCNC: 142 MMOL/L (ref 133–144)

## 2018-02-28 PROCEDURE — 93010 ELECTROCARDIOGRAM REPORT: CPT | Mod: ZP | Performed by: INTERNAL MEDICINE

## 2018-02-28 PROCEDURE — 93005 ELECTROCARDIOGRAM TRACING: CPT | Mod: ZF

## 2018-02-28 PROCEDURE — 80162 ASSAY OF DIGOXIN TOTAL: CPT | Performed by: NURSE PRACTITIONER

## 2018-02-28 PROCEDURE — 80048 BASIC METABOLIC PNL TOTAL CA: CPT | Performed by: NURSE PRACTITIONER

## 2018-02-28 PROCEDURE — 99214 OFFICE O/P EST MOD 30 MIN: CPT | Mod: GC | Performed by: INTERNAL MEDICINE

## 2018-02-28 PROCEDURE — 36415 COLL VENOUS BLD VENIPUNCTURE: CPT | Performed by: NURSE PRACTITIONER

## 2018-02-28 PROCEDURE — G0463 HOSPITAL OUTPT CLINIC VISIT: HCPCS | Mod: 25,ZF

## 2018-02-28 PROCEDURE — T1013 SIGN LANG/ORAL INTERPRETER: HCPCS | Mod: U3

## 2018-02-28 ASSESSMENT — PAIN SCALES - GENERAL: PAINLEVEL: NO PAIN (0)

## 2018-02-28 NOTE — NURSING NOTE
Chief Complaint   Patient presents with     New Patient     53 yo male h/o ICM, CAD with a RCA STEMI s/p PCI x 7 to RCA, left circumflex (now ), and LAD on 3/27/17 complicated by PEDRO, sepsis, sacral ulcer, DVT, bilateral hemispheric infarcts secondary to watershed ischemia, and cardiogenic shock s/p IABP, later transitioned to a subclavian balloon pump and s/p mitral clip for ischemic MR present for MR- EKG for Mitral valve regurgitation     Vitals were taken and medications were reconciled. EKG was performed.    KATYA Hernandes  9:38 AM

## 2018-02-28 NOTE — MR AVS SNAPSHOT
After Visit Summary   2/28/2018    Luke Henao    MRN: [id number]           Patient Information     Date Of Birth          1965        Visit Information        Provider Department      2/28/2018 9:45 AM Janine Denton Gardar, MD [facility]        Today's Diagnoses     Mitral valve regurgitation    -  1    Ischemic cardiomyopathy          Care Instructions    ou were seen today in the Cardiovascular Clinic at the [facility].     Cardiology Providers you saw during your visit: Dr. Amanda Amaral      Diagnosis:   Encounter Diagnoses   Name Primary?     Ischemic cardiomyopathy      Mitral valve regurgitation Yes        Results: Discussed with you today EKG      Orders:   Orders Placed This Encounter   Procedures     Follow-Up with Cardiologist     EKG 12-lead, tracing only (Same Day)       Current Medication List  Current Outpatient Prescriptions   Medication Sig Dispense Refill     lisinopril (PRINIVIL/ZESTRIL) 2.5 MG tablet Take 1 tablet (2.5 mg) by mouth daily 30 tablet 3     metoprolol succinate (TOPROL-XL) 50 MG 24 hr tablet Take 1 tablet (50 mg) by mouth daily 90 tablet 3     digoxin (LANOXIN) 125 MCG tablet Take one tablet on M, W, F, Sunday. 60 tablet 5     aspirin 81 MG chewable tablet Take 1 tablet (81 mg) by mouth daily 36 tablet 3     metFORMIN (GLUCOPHAGE-XR) 500 MG 24 hr tablet Take 2 tablets (1,000 mg) by mouth daily (with dinner) 180 tablet 3     glipiZIDE (GLUCOTROL) 5 MG tablet Take 1 tablet (5 mg) by mouth 2 times daily (before meals) 180 tablet 3     Benzoyl Peroxide 2.5 % CREA Externally apply topically daily 21 g 11     tretinoin (RETIN-A) 0.05 % cream Spread a pea size amount into affected area topically at bedtime.  Use sunscreen SPF>20. 45 g 11     potassium chloride SA (K-DUR/KLOR-CON M) 20 MEQ CR tablet Take 1 tablet (20 mEq) by mouth daily 90 tablet 3     bumetanide (BUMEX) 0.5 MG tablet Take 1 tablet (0.5 mg) by mouth every other day 45  "tablet 3     ONETOUCH ULTRA test strip USE TO TEST BLOOD SUGAR 3 TIMES DAILY OR AS DIRECTED. 100 strip 3     insulin pen needle (BD KYLEE U/F) 32G X 4 MM Use 3 daily or as directed. 100 each prn     alcohol swab prep pads Use to swab area of injection/mary alice as directed 3 x per day 100 each 11     atorvastatin (LIPITOR) 40 MG tablet 1 tablet (40 mg) by Oral or Feeding Tube route daily 60 tablet 7     clopidogrel (PLAVIX) 75 MG tablet Take 1 tablet (75 mg) by mouth daily 60 tablet 11     finasteride (PROSCAR) 5 MG tablet Take 1 tablet (5 mg) by mouth daily 60 tablet 11     ASPIRIN NOT PRESCRIBED (INTENTIONAL) Please choose reason not prescribed, below 0 each 0     tamsulosin (FLOMAX) 0.4 MG capsule Take 1 capsule (0.4 mg) by mouth daily 60 capsule 0     zinc sulfate (ZINCATE) 220 (50 ZN) MG capsule Take 1 capsule (220 mg) by mouth daily 2 capsule 0     Sharps Container MISC 1 each every 30 days 1 each 0     Warfarin Therapy Reminder 1 each continuous prn           Medications Discontinued:  There are no discontinued medications.      Recommendations:   1. Continue to follow up with [facility] with Labs BMP drawn at that time    Follow-up: 6 months with Dr. Amaral         Please feel free to call me with any questions or concerns.       Katelyn Simon LPN     Questions and [phone].   First press #1 for the [employer] and then press #3 for \"Medical Questions\" to reach us Cardiology Nurses.      On Call Cardiologist for after hours or on weekends: [phone]   option #4 and ask to speak to the on-call Cardiologist.          If you need a medication refill please contact your pharmacy.  Please allow 3 business days for your refill to be completed.  ________________________________________________________________________________________________________________________________              Follow-ups after your visit        Additional Services     Follow-Up with Cardiologist                 Your next 10 " appointments already scheduled     Mar 12, 2018 11:30 AM CDT   Lab with [facility] LAB   [facility] ([facility])    [address]  1st Floor  [facility] [address] [phone]            Mar 12, 2018 12:00 PM CDT   (Arrive by 11:45 AM)   [facility] RETURN with Vandana Zambrano NP   [facility] ([facility])    [address]  [address]  [facility] [id number] [phone]            Aug 29, 2018  9:30 AM CDT   (Arrive by 9:15 AM)   Implanted Defibulator with Uc Cv Device 1   [facility] ([facility])    [address]  [address]  [place] [facility] [id number] [phone]            Aug 29, 2018 10:00 AM CDT   (Arrive by 9:45 AM)   Return Visit with Amanda Amaral MD   [facility] ([facility])    [address] [place] [address]  [address]  [facility] [id number] [phone]              Future tests that were ordered for you today     Open Future Orders        Priority Expected Expires Ordered    Follow-Up with Cardiologist Routine 8/27/2018 11/25/2018 2/28/2018            Who to contact     If you have questions or need follow up information about today's clinic visit or your schedule please contact [facility] directly at [phone].  Normal or non-critical lab and imaging results will be communicated to you by [employer], letter or phone within 4 business days after the clinic has received the results. If you do not hear from us within 7 days, please contact the clinic through [employer] or phone. If you have a critical or abnormal lab result, we will notify you by phone as soon as possible.  Submit refill requests through [employer] or call your pharmacy and they will forward the refill request to us. Please allow 3 business days for your refill to be completed.          Additional Information About Your Visit        [employer] Information     [employer] lets you send messages to  "your doctor, view your test results, renew your prescriptions, schedule appointments and more. To sign up, go to [email or web address] . Click on \"Log in\" on the left side of the screen, which will take you to the Welcome page. Then click on \"Sign up Now\" on the right side of the page.     You will be asked to enter the access code listed below, as well as some personal information. Please follow the directions to create your username and password.     Your access code is: [email or web address]  Expires: 4/3/2018  3:45 PM     Your access code will  in 90 days. If you need help or a new code, please call your [place] clinic or [phone].        Care EveryWhere ID     This is your Care EveryWhere ID. This could be used by other organizations to access your [place] medical records  [id number]        Your Vitals Were     Pulse Height Pulse Oximetry BMI (Body Mass Index)          90 1.575 m (5' 2\") 100% 22.31 kg/m2         Blood Pressure from Last 3 Encounters:   18 116/80   18 120/85   18 (!) 141/93    Weight from Last 3 Encounters:   18 55.3 kg (122 lb)   18 55.3 kg (122 lb)   18 55.2 kg (121 lb 11.2 oz)              We Performed the Following     EKG 12-lead, tracing only (Same Day)     Follow-Up with Cardiologist        Primary Care Provider Office Phone # Fax #    Shanna Church [phone]       [address] [facility] [address]        Equal Access to Services     [facility]: Hadii linda Justin, mirlande paniagua, qakaren patel . So [facility] [place] [phone].    ATENCIÓN: Si habla español, tiene a suarez disposición servicios gratuitos de asistencia lingüística. Llame al [phone].    We comply with applicable federal civil rights laws and [place] laws. We do not discriminate on the basis of race, color, national origin, age, disability, sex, sexual orientation, or gender identity.          "   Thank you!     Thank you for choosing [facility]  for your care. Our goal is always to provide you with excellent care. Hearing back from our patients is one way we can continue to improve our services. Please take a few minutes to complete the written survey that you may receive in the mail after your visit with us. Thank you!             Your Updated Medication List - Protect others around you: Learn how to safely use, store and throw away your medicines at www.disposemymeds.org.          This list is accurate as of 2/28/18 11:13 AM.  Always use your most recent med list.                   Brand Name Dispense Instructions for use Diagnosis    alcohol swab prep pads     100 each    Use to swab area of injection/mary alice as directed 3 x per day        aspirin 81 MG chewable tablet     36 tablet    Take 1 tablet (81 mg) by mouth daily    Coronary artery disease involving native coronary artery of native heart without angina pectoris       ASPIRIN NOT PRESCRIBED    INTENTIONAL    0 each    Please choose reason not prescribed, below    Cardiogenic shock (H), Type 2 diabetes mellitus without complication, with long-term current use of insulin (H)       atorvastatin 40 MG tablet    LIPITOR    60 tablet    1 tablet (40 mg) by Oral or Feeding Tube route daily    Coronary artery disease involving native coronary artery of native heart without angina pectoris, Cardiogenic shock (H)       Benzoyl Peroxide 2.5 % Crea     21 g    Externally apply topically daily    Acne vulgaris       bumetanide 0.5 MG tablet    BUMEX    45 tablet    Take 1 tablet (0.5 mg) by mouth every other day    Ischemic cardiomyopathy       clopidogrel 75 MG tablet    PLAVIX    60 tablet    Take 1 tablet (75 mg) by mouth daily    ST elevation myocardial infarction (STEMI), unspecified artery (H)       digoxin 125 MCG tablet    LANOXIN    60 tablet    Take one tablet on M, W, F, Sunday.    Coronary artery disease involving native coronary artery of  native heart without angina pectoris       finasteride 5 MG tablet    PROSCAR    60 tablet    Take 1 tablet (5 mg) by mouth daily    Hypertrophy of prostate with urinary obstruction       glipiZIDE 5 MG tablet    GLUCOTROL    180 tablet    Take 1 tablet (5 mg) by mouth 2 times daily (before meals)    Type 2 diabetes mellitus without complication, without long-term current use of insulin (H)       insulin pen needle 32G X 4 MM    BD KYLEE U/F    100 each    Use 3 daily or as directed.    Diabetes mellitus type 2, insulin dependent (H)       lisinopril 2.5 MG tablet    PRINIVIL/Zestril    30 tablet    Take 1 tablet (2.5 mg) by mouth daily    Ischemic cardiomyopathy       metFORMIN 500 MG 24 hr tablet    GLUCOPHAGE-XR    180 tablet    Take 2 tablets (1,000 mg) by mouth daily (with dinner)    Type 2 diabetes mellitus without complication, without long-term current use of insulin (H)       metoprolol succinate 50 MG 24 hr tablet    TOPROL-XL    90 tablet    Take 1 tablet (50 mg) by mouth daily    Chronic systolic congestive heart failure (H)       ONETOUCH ULTRA test strip   Generic drug:  blood glucose monitoring     100 strip    USE TO TEST BLOOD SUGAR 3 TIMES DAILY OR AS DIRECTED.    Diabetes mellitus type 2, insulin dependent (H)       potassium chloride SA 20 MEQ CR tablet    K-DUR/KLOR-CON M    90 tablet    Take 1 tablet (20 mEq) by mouth daily    Ischemic cardiomyopathy       Sharps Container Misc     1 each    1 each every 30 days    Diabetes mellitus type 2, insulin dependent (H)       tamsulosin 0.4 MG capsule    FLOMAX    60 capsule    Take 1 capsule (0.4 mg) by mouth daily    Ischemic cardiomyopathy       tretinoin 0.05 % cream    RETIN-A    45 g    Spread a pea size amount into affected area topically at bedtime.  Use sunscreen SPF>20.    Acne vulgaris       Warfarin Therapy Reminder      1 each continuous prn    Deep vein thrombosis (DVT) of left lower extremity, unspecified chronicity, unspecified vein (H)        zinc sulfate 220 (50 ZN) MG capsule    ZINCATE    2 capsule    Take 1 capsule (220 mg) by mouth daily    Ischemic cardiomyopathy

## 2018-02-28 NOTE — LETTER
2/28/2018      RE: Luke Henao  [address]  [facility] [address]       Dear Colleague,    Thank you for the opportunity to participate in the care of your patient, Luke Henao, at the [facility]. Please see a copy of my visit note below.               Cardiology Clinic Follow-up Note    Assessment and Plan:   53 y/o M with PMHx significant for tobacco use, DM type II, and CAD (RCA - STEMI s/p PCI - 3/27/17, also to LCx and LAD), STEMI c/b DVT, b/l watershed ischemia, and cardiogenic shock s/p IABP, and ischemic MR s/p mitral clip (5/1/17), and ischemic cardiomyiopathy (s/p ICD 10/2017) presenting to establish care with Dr. Amaral after Dr. Cortez' intermediate. He also follows in [facility] clinic and with Dr. Schaffer for ischemic cardiomyopathy.    - ischemic cardiomyopathy (EF 30-30%), NYHA II  - s/p SC-ICD  - coronary artery disease / inferior STEMI (s/p PCI to all 3 major vessels, LCx )  - ischemic mitral regurgitation (s/p MitraClip 5/2017)  - diabetes mellitus-II  - h/o deep vein thrombosis (s/p anticoagulation)  - watershed infarcts bilaterally secondary to STEMI / cardiogenic shock  - chronic kidney disease    From a functional standpoint, he seems to be doing very well. He has minimal complaints today with exception of fatigue on exertional after 15-20 minutes, although has been improving slowly as he exercises more. Would prefer to increase ACE today but [facility] clinic has been managing this well, and they have noted reduction in renal function on titration. He may need formal evaluation for renal artery stenosis should further trials at ACE titration fail. No significant murmur on exam, and TTE has shown stable valvular function with mild MR and little gradient.    Recommendations:  - defer ACE / BB titration to [facility] clinic  - continue current medical management and exercises  - from valve perspective, would repeat TTE in one year if not done  for other purposes  - consider renal Doppler for evaluation of renal stenosis if ACE titration continues to be problematic    Follow-up with us 6 months or sooner as needed. He sees [facility] clinic in 2 weeks.    Dmitriy Merino  Cardiology Fellow    Patient was seen by and the above plan discussed with Dr. Amaral.  Subjective:   51 y/o M with PMHx significant for tobacco use, DM type II, and CAD (RCA - STEMI s/p PCI - 3/27/17, also to LCx and LAD), STEMI c/b DVT, b/l watershed ischemia, and cardiogenic shock s/p IABP, and ischemic MR s/p mitral clip (5/1/17), and ischemic cardiomyiopathy (s/p ICD 10/2017) presenting to establish care with Dr. Amaral after Dr. Cortez' residential. He also follows in [facility] clinic and with Dr. Schaffer for ischemic cardiomyopathy.     used for translation.    Patient denies current chest pain, dyspnea on exertion, orthopnea, PND, lightheadedness, or palpitations. He does complain of exertional fatigue after 15-20 minutes of walking, but no exertional chest pains. No syncope nor presyncope. He walks every day multiple times. He feels his legs are weak, but getting slowly better since his MI last year. He has had no bleeding issues with his asa / Plavix. No LE swelling nor recent illnesses. No bleeding issues relating to DAPT therapy.     Review of Systems:   A comprehensive review of systems was performed and found to be negative except as described in this note     Medications:     Current Outpatient Prescriptions   Medication Sig     lisinopril (PRINIVIL/ZESTRIL) 2.5 MG tablet Take 1 tablet (2.5 mg) by mouth daily     metoprolol succinate (TOPROL-XL) 50 MG 24 hr tablet Take 1 tablet (50 mg) by mouth daily     digoxin (LANOXIN) 125 MCG tablet Take one tablet on M, W, F, Sunday.     aspirin 81 MG chewable tablet Take 1 tablet (81 mg) by mouth daily     metFORMIN (GLUCOPHAGE-XR) 500 MG 24 hr tablet Take 2 tablets (1,000 mg) by mouth daily (with dinner)     glipiZIDE (GLUCOTROL) 5 MG  tablet Take 1 tablet (5 mg) by mouth 2 times daily (before meals)     Benzoyl Peroxide 2.5 % CREA Externally apply topically daily     tretinoin (RETIN-A) 0.05 % cream Spread a pea size amount into affected area topically at bedtime.  Use sunscreen SPF>20.     potassium chloride SA (K-DUR/KLOR-CON M) 20 MEQ CR tablet Take 1 tablet (20 mEq) by mouth daily     bumetanide (BUMEX) 0.5 MG tablet Take 1 tablet (0.5 mg) by mouth every other day     ONETOUCH ULTRA test strip USE TO TEST BLOOD SUGAR 3 TIMES DAILY OR AS DIRECTED.     insulin pen needle (BD KYLEE U/F) 32G X 4 MM Use 3 daily or as directed.     alcohol swab prep pads Use to swab area of injection/mary alice as directed 3 x per day     atorvastatin (LIPITOR) 40 MG tablet 1 tablet (40 mg) by Oral or Feeding Tube route daily     clopidogrel (PLAVIX) 75 MG tablet Take 1 tablet (75 mg) by mouth daily     finasteride (PROSCAR) 5 MG tablet Take 1 tablet (5 mg) by mouth daily     ASPIRIN NOT PRESCRIBED (INTENTIONAL) Please choose reason not prescribed, below     tamsulosin (FLOMAX) 0.4 MG capsule Take 1 capsule (0.4 mg) by mouth daily     zinc sulfate (ZINCATE) 220 (50 ZN) MG capsule Take 1 capsule (220 mg) by mouth daily     Sharps Container MISC 1 each every 30 days     Warfarin Therapy Reminder 1 each continuous prn     No current facility-administered medications for this visit.           Other History:     Past Medical History:   Diagnosis Date     CAD (coronary artery disease) 2017    RCA STEMI s/p balloon angioplasty and multiple Sofie to RCA, LCx, and LAD     Cardiogenic shock (H)      DVT (deep venous thrombosis) (H) 2017    left internal jugular (line-associated); left common femoral; on coumadin     Ischemic cardiomyopathy 2017    EF 30-35%     Ischemic stroke (H) 2017    no residual deficits     Lower GI bleed 2017    due to rectal ulcer     Mitral valve insufficiency, ischemic 2017    s/p Mitral Clip placement      Skin ulcer of sacrum (H)      Systolic heart  failure (H)      Type 2 diabetes mellitus (H)      No Known Allergies  Family History   Problem Relation Age of Onset     Hypertension Mother      Type 2 Diabetes Father      Hypertension Father      Myocardial Infarction No family hx of      CEREBROVASCULAR DISEASE No family hx of      Coronary Artery Disease Early Onset No family hx of      Colon Cancer No family hx of      Prostate Cancer No family hx of      Social History     Social History     Marital status:      Spouse name: N/A     Number of children: N/A     Years of education: N/A     Occupational History     [employer]      Social History Main Topics     Smoking status: Former Smoker     Packs/day: 0.50     Years: 30.00     Types: Cigarettes     Quit date: 1/1/2017     Smokeless tobacco: Never Used     Alcohol use No     Drug use: No     Sexual activity: Not Currently     Other Topics Concern     Not on file     Social History Narrative    . Remarried.    4 children with first marriage.    2 grand children.    Walks daily, but no formal exercise.          Past Surgical History:   Procedure Laterality Date     C INSERT ELECTRD LEADS/REPOSTION  10/27/2017          COLONOSCOPY N/A 4/17/2017    Procedure: COLONOSCOPY;  Surgeon: Rashaad Bundy MD;  Location: [facility]     INSERT INTRAAORTIC BALLOON PUMP Right 4/19/2017    Procedure: INSERT INTRAAORTIC BALLOON PUMP;  Right Subclavian Intra Aortic Balloon Pump Insertion using Maquet 40cc Ballon Catheter, Implentation of 8mm Gelweave Woven Vascular Prosthesis, Removal of Left Femoral Ballon Pump Catheter, Flouroscopy;  Surgeon: Keshav Leung MD;  Location:  OR     PERCUTANEOUS MITRAL VALVE REPAIR N/A 5/1/2017    Procedure: PERCUTANEOUS MITRAL VALVE REPAIR ANESTHESIA;  Mitraclip Procedure Possible Cardiopulmonary Bypass ;  Surgeon: Ron Cortez MD;  Location:  OR     SUBCLAVIAN AORTIC VALVE IMPLANT N/A 5/8/2017    Procedure: SUBCLAVIAN AORTIC VALVE IMPLANT;   "Right Subclavian Graft Removal ;  Surgeon: Keshav Leung MD;  Location: [facility]       Objective:   Blood pressure 116/80, pulse 90, height 1.575 m (5' 2\"), weight 55.3 kg (122 lb), SpO2 100 %.  General Appearance: NAD, alert and oriented x 3  Respiratory: clear bilaterally, good air movement, no distress or accessory muscle use  Cardiovascular: RRR, no murmurs, no rub, no JVD  GI: non-tender, non-distended, (+) BS  Skin: no rash  Extr: no edema, warm and well perfused  Neuro: non-focal findings     Data:   - reviewed all labs and imaging    EKG (today):  - q-wave in III, LAD, suggestive of LAE    TTE (12/2017):  Moderately reduced systolic function. EF 30-35%. Global hypokinesis with akinesis of the basal-mid inferior and basal-mid inferolateral walls.  Global right ventricular function is mildly reduced.  Status post transcatheter mitral valve repair with Mitraclip. The clip is attached to the leaflets. There is trace to mild mitral regurgitation. Mean gradient 5 mmHg at heart rate 97 bpm.  Pulmonary hypertension with right ventricular systolic pressure 41mmHg above the right atrial pressure present.  No pericardial effusion.     Compared to the prior study 8/10/17 there has been no significant change.       Staff Physician Comments:     I personally interviewed and examined Luke Henao today, and agree with cardiology fellows assessment and plan as documented above. Improved functional status and improved creatinine suggest good prognosis. Off warfarin and needs to continue with dual antiplatelet therapy for 1-2 years. If he needs to start warfarin again we could consider taking Plavix alone.      Amanda Amaral MD     Division of Cardiology  [facility]      Encounter Diagnosis:     1. History of mitral valve repair with mitral clip 2017    2. S/P coronary artery stent placement 2017    3. Ischemic cardiomyopathy    4. Mitral valve regurgitation    5. Coronary artery " disease involving native heart without angina pectoris, unspecified vessel or lesion type

## 2018-02-28 NOTE — PATIENT INSTRUCTIONS
ou were seen today in the Cardiovascular Clinic at the [facility].     Cardiology Providers you saw during your visit: Dr. Amanda Amaral      Diagnosis:   Encounter Diagnoses   Name Primary?     Ischemic cardiomyopathy      Mitral valve regurgitation Yes        Results: Discussed with you today EKG      Orders:   Orders Placed This Encounter   Procedures     Follow-Up with Cardiologist     EKG 12-lead, tracing only (Same Day)       Current Medication List  Current Outpatient Prescriptions   Medication Sig Dispense Refill     lisinopril (PRINIVIL/ZESTRIL) 2.5 MG tablet Take 1 tablet (2.5 mg) by mouth daily 30 tablet 3     metoprolol succinate (TOPROL-XL) 50 MG 24 hr tablet Take 1 tablet (50 mg) by mouth daily 90 tablet 3     digoxin (LANOXIN) 125 MCG tablet Take one tablet on M, W, F, Sunday. 60 tablet 5     aspirin 81 MG chewable tablet Take 1 tablet (81 mg) by mouth daily 36 tablet 3     metFORMIN (GLUCOPHAGE-XR) 500 MG 24 hr tablet Take 2 tablets (1,000 mg) by mouth daily (with dinner) 180 tablet 3     glipiZIDE (GLUCOTROL) 5 MG tablet Take 1 tablet (5 mg) by mouth 2 times daily (before meals) 180 tablet 3     Benzoyl Peroxide 2.5 % CREA Externally apply topically daily 21 g 11     tretinoin (RETIN-A) 0.05 % cream Spread a pea size amount into affected area topically at bedtime.  Use sunscreen SPF>20. 45 g 11     potassium chloride SA (K-DUR/KLOR-CON M) 20 MEQ CR tablet Take 1 tablet (20 mEq) by mouth daily 90 tablet 3     bumetanide (BUMEX) 0.5 MG tablet Take 1 tablet (0.5 mg) by mouth every other day 45 tablet 3     ONETOUCH ULTRA test strip USE TO TEST BLOOD SUGAR 3 TIMES DAILY OR AS DIRECTED. 100 strip 3     insulin pen needle (BD KYLEE U/F) 32G X 4 MM Use 3 daily or as directed. 100 each prn     alcohol swab prep pads Use to swab area of injection/mary alice as directed 3 x per day 100 each 11     atorvastatin (LIPITOR) 40 MG tablet 1 tablet (40 mg) by Oral or Feeding Tube route daily 60 tablet 7  "    clopidogrel (PLAVIX) 75 MG tablet Take 1 tablet (75 mg) by mouth daily 60 tablet 11     finasteride (PROSCAR) 5 MG tablet Take 1 tablet (5 mg) by mouth daily 60 tablet 11     ASPIRIN NOT PRESCRIBED (INTENTIONAL) Please choose reason not prescribed, below 0 each 0     tamsulosin (FLOMAX) 0.4 MG capsule Take 1 capsule (0.4 mg) by mouth daily 60 capsule 0     zinc sulfate (ZINCATE) 220 (50 ZN) MG capsule Take 1 capsule (220 mg) by mouth daily 2 capsule 0     Sharps Container MISC 1 each every 30 days 1 each 0     Warfarin Therapy Reminder 1 each continuous prn           Medications Discontinued:  There are no discontinued medications.      Recommendations:   1. Continue to follow up with [facility] with Labs BMP drawn at that time    Follow-up: 6 months with Dr. Amaral         Please feel free to call me with any questions or concerns.       Katelyn Simon LPN     Questions and [phone].   First press #1 for the [employer] and then press #3 for \"Medical Questions\" to reach us Cardiology Nurses.      On Call Cardiologist for after hours or on weekends: [phone]   option #4 and ask to speak to the on-call Cardiologist.          If you need a medication refill please contact your pharmacy.  Please allow 3 business days for your refill to be completed.  ________________________________________________________________________________________________________________________________      "

## 2018-02-28 NOTE — PROGRESS NOTES
Cardiology Clinic Follow-up Note    Assessment and Plan:   51 y/o M with PMHx significant for tobacco use, DM type II, and CAD (RCA - STEMI s/p PCI - 3/27/17, also to LCx and LAD), STEMI c/b DVT, b/l watershed ischemia, and cardiogenic shock s/p IABP, and ischemic MR s/p mitral clip (5/1/17), and ischemic cardiomyiopathy (s/p ICD 10/2017) presenting to establish care with Dr. Amaral after Dr. Cortez' skilled nursing. He also follows in [facility] and with Dr. Schaffer for ischemic cardiomyopathy.    - ischemic cardiomyopathy (EF 30-30%), NYHA II  - s/p SC-ICD  - coronary artery disease / inferior STEMI (s/p PCI to all 3 major vessels, LCx )  - ischemic mitral regurgitation (s/p MitraClip 5/2017)  - diabetes mellitus-II  - h/o deep vein thrombosis (s/p anticoagulation)  - watershed infarcts bilaterally secondary to STEMI / cardiogenic shock  - chronic kidney disease    From a functional standpoint, he seems to be doing very well. He has minimal complaints today with exception of fatigue on exertional after 15-20 minutes, although has been improving slowly as he exercises more. Would prefer to increase ACE today but [facility] has been managing this well, and they have noted reduction in renal function on titration. He may need formal evaluation for renal artery stenosis should further trials at ACE titration fail. No significant murmur on exam, and TTE has shown stable valvular function with mild MR and little gradient.      Recommendations:  - defer ACE / BB titration to [facility]  - continue current medical management and exercises  - from valve perspective, would repeat TTE in one year if not done for other purposes  - consider renal Doppler for evaluation of renal stenosis if ACE titration continues to be problematic    Follow-up with us 6 months or sooner as needed. He sees [facility] in 2 weeks.    Dmitriy Merino  Cardiology Fellow    Patient was seen by and the above plan discussed with   Cristin.  Subjective:   51 y/o M with PMHx significant for tobacco use, DM type II, and CAD (RCA - STEMI s/p PCI - 3/27/17, also to LCx and LAD), STEMI c/b DVT, b/l watershed ischemia, and cardiogenic shock s/p IABP, and ischemic MR s/p mitral clip (5/1/17), and ischemic cardiomyiopathy (s/p ICD 10/2017) presenting to establish care with Dr. Amaral after Dr. Cortez' prison. He also follows in [facility] and with Dr. Schaffer for ischemic cardiomyopathy.     used for translation.    Patient denies current chest pain, dyspnea on exertion, orthopnea, PND, lightheadedness, or palpitations. He does complain of exertional fatigue after 15-20 minutes of walking, but no exertional chest pains. No syncope nor presyncope. He walks every day multiple times. He feels his legs are weak, but getting slowly better since his MI last year. He has had no bleeding issues with his asa / Plavix. No LE swelling nor recent illnesses. No bleeding issues relating to DAPT therapy.     Review of Systems:   A comprehensive review of systems was performed and found to be negative except as described in this note     Medications:     Current Outpatient Prescriptions   Medication Sig     lisinopril (PRINIVIL/ZESTRIL) 2.5 MG tablet Take 1 tablet (2.5 mg) by mouth daily     metoprolol succinate (TOPROL-XL) 50 MG 24 hr tablet Take 1 tablet (50 mg) by mouth daily     digoxin (LANOXIN) 125 MCG tablet Take one tablet on M, W, F, Sunday.     aspirin 81 MG chewable tablet Take 1 tablet (81 mg) by mouth daily     metFORMIN (GLUCOPHAGE-XR) 500 MG 24 hr tablet Take 2 tablets (1,000 mg) by mouth daily (with dinner)     glipiZIDE (GLUCOTROL) 5 MG tablet Take 1 tablet (5 mg) by mouth 2 times daily (before meals)     Benzoyl Peroxide 2.5 % CREA Externally apply topically daily     tretinoin (RETIN-A) 0.05 % cream Spread a pea size amount into affected area topically at bedtime.  Use sunscreen SPF>20.     potassium chloride SA (K-DUR/KLOR-CON  M) 20 MEQ CR tablet Take 1 tablet (20 mEq) by mouth daily     bumetanide (BUMEX) 0.5 MG tablet Take 1 tablet (0.5 mg) by mouth every other day     ONETOUCH ULTRA test strip USE TO TEST BLOOD SUGAR 3 TIMES DAILY OR AS DIRECTED.     insulin pen needle (BD KYLEE U/F) 32G X 4 MM Use 3 daily or as directed.     alcohol swab prep pads Use to swab area of injection/mary alice as directed 3 x per day     atorvastatin (LIPITOR) 40 MG tablet 1 tablet (40 mg) by Oral or Feeding Tube route daily     clopidogrel (PLAVIX) 75 MG tablet Take 1 tablet (75 mg) by mouth daily     finasteride (PROSCAR) 5 MG tablet Take 1 tablet (5 mg) by mouth daily     ASPIRIN NOT PRESCRIBED (INTENTIONAL) Please choose reason not prescribed, below     tamsulosin (FLOMAX) 0.4 MG capsule Take 1 capsule (0.4 mg) by mouth daily     zinc sulfate (ZINCATE) 220 (50 ZN) MG capsule Take 1 capsule (220 mg) by mouth daily     Sharps Container MISC 1 each every 30 days     Warfarin Therapy Reminder 1 each continuous prn     No current facility-administered medications for this visit.           Other History:     Past Medical History:   Diagnosis Date     CAD (coronary artery disease) 2017    RCA STEMI s/p balloon angioplasty and multiple Sofie to RCA, LCx, and LAD     Cardiogenic shock (H)      DVT (deep venous thrombosis) (H) 2017    left internal jugular (line-associated); left common femoral; on coumadin     Ischemic cardiomyopathy 2017    EF 30-35%     Ischemic stroke (H) 2017    no residual deficits     Lower GI bleed 2017    due to rectal ulcer     Mitral valve insufficiency, ischemic 2017    s/p Mitral Clip placement      Skin ulcer of sacrum (H)      Systolic heart failure (H)      Type 2 diabetes mellitus (H)      No Known Allergies  Family History   Problem Relation Age of Onset     Hypertension Mother      Type 2 Diabetes Father      Hypertension Father      Myocardial Infarction No family hx of      CEREBROVASCULAR DISEASE No family hx of      Coronary  "Artery Disease Early Onset No family hx of      Colon Cancer No family hx of      Prostate Cancer No family hx of      Social History     Social History     Marital status:      Spouse name: N/A     Number of children: N/A     Years of education: N/A     Occupational History     [employer]      Social History Main Topics     Smoking status: Former Smoker     Packs/day: 0.50     Years: 30.00     Types: Cigarettes     Quit date: 1/1/2017     Smokeless tobacco: Never Used     Alcohol use No     Drug use: No     Sexual activity: Not Currently     Other Topics Concern     Not on file     Social History Narrative    . Remarried.    4 children with first marriage.    2 grand children.    Walks daily, but no formal exercise.          Past Surgical History:   Procedure Laterality Date     C INSERT ELECTRD LEADS/REPOSTION  10/27/2017          COLONOSCOPY N/A 4/17/2017    Procedure: COLONOSCOPY;  Surgeon: Rashaad Budny MD;  Location: [facility]     INSERT INTRAAORTIC BALLOON PUMP Right 4/19/2017    Procedure: INSERT INTRAAORTIC BALLOON PUMP;  Right Subclavian Intra Aortic Balloon Pump Insertion using Maquet 40cc Ballon Catheter, Implentation of 8mm Gelweave Woven Vascular Prosthesis, Removal of Left Femoral Ballon Pump Catheter, Flouroscopy;  Surgeon: Keshav Leung MD;  Location: [facility]     PERCUTANEOUS MITRAL VALVE REPAIR N/A 5/1/2017    Procedure: PERCUTANEOUS MITRAL VALVE REPAIR ANESTHESIA;  Mitraclip Procedure Possible Cardiopulmonary Bypass ;  Surgeon: Ron Cortez MD;  Location: [facility]     SUBCLAVIAN AORTIC VALVE IMPLANT N/A 5/8/2017    Procedure: SUBCLAVIAN AORTIC VALVE IMPLANT;  Right Subclavian Graft Removal ;  Surgeon: Keshav Leung MD;  Location: [facility]       Objective:   Blood pressure 116/80, pulse 90, height 1.575 m (5' 2\"), weight 55.3 kg (122 lb), SpO2 100 %.  General Appearance: NAD, alert and oriented x 3  Respiratory: clear bilaterally, good air " movement, no distress or accessory muscle use  Cardiovascular: RRR, no murmurs, no rub, no JVD  GI: non-tender, non-distended, (+) BS  Skin: no rash  Extr: no edema, warm and well perfused  Neuro: non-focal findings     Data:   - reviewed all labs and imaging    EKG (today):  - q-wave in III, LAD, suggestive of LAE    TTE (12/2017):  Moderately reduced systolic function. EF 30-35%. Global hypokinesis with akinesis of the basal-mid inferior and basal-mid inferolateral walls.  Global right ventricular function is mildly reduced.  Status post transcatheter mitral valve repair with Mitraclip. The clip is attached to the leaflets. There is trace to mild mitral regurgitation. Mean gradient 5 mmHg at heart rate 97 bpm.  Pulmonary hypertension with right ventricular systolic pressure 41mmHg above the right atrial pressure present.  No pericardial effusion.     Compared to the prior study 8/10/17 there has been no significant change.       Staff Physician Comments:     I personally interviewed and examined Luke Henao today, and agree with cardiology fellows assessment and plan as documented above. Improved functional status and improved creatinine suggest good prognosis. Off warfarin and needs to continue with dual antiplatelet therapy for 1-2 years. If he needs to start warfarin again we could consider taking Plavix alone.      Amanda Amaral MD     Division of Cardiology  [facility]      Encounter Diagnosis:     1. History of mitral valve repair with mitral clip 2017    2. S/P coronary artery stent placement 2017    3. Ischemic cardiomyopathy    4. Mitral valve regurgitation    5. Coronary artery disease involving native heart without angina pectoris, unspecified vessel or lesion type

## 2018-03-01 LAB — INTERPRETATION ECG - MUSE: NORMAL

## 2018-03-04 PROBLEM — Z95.5 S/P CORONARY ARTERY STENT PLACEMENT: Status: ACTIVE | Noted: 2018-03-04

## 2018-03-04 PROBLEM — Z98.890 HISTORY OF MITRAL VALVE REPAIR: Status: ACTIVE | Noted: 2018-03-04

## 2018-03-06 ENCOUNTER — PRE VISIT (OUTPATIENT)
Dept: CARDIOLOGY | Facility: CLINIC | Age: 53
End: 2018-03-06

## 2018-03-06 DIAGNOSIS — I25.5 ISCHEMIC CARDIOMYOPATHY: Primary | ICD-10-CM

## 2018-03-12 ENCOUNTER — OFFICE VISIT (OUTPATIENT)
Dept: CARDIOLOGY | Facility: CLINIC | Age: 53
End: 2018-03-12
Attending: NURSE PRACTITIONER
Payer: COMMERCIAL

## 2018-03-12 VITALS
OXYGEN SATURATION: 99 % | HEIGHT: 62 IN | SYSTOLIC BLOOD PRESSURE: 113 MMHG | HEART RATE: 90 BPM | BODY MASS INDEX: 22.97 KG/M2 | DIASTOLIC BLOOD PRESSURE: 78 MMHG | WEIGHT: 124.8 LBS

## 2018-03-12 DIAGNOSIS — I25.5 ISCHEMIC CARDIOMYOPATHY: Primary | ICD-10-CM

## 2018-03-12 DIAGNOSIS — L70.0 ACNE VULGARIS: ICD-10-CM

## 2018-03-12 DIAGNOSIS — I25.10 CORONARY ARTERY DISEASE INVOLVING NATIVE CORONARY ARTERY OF NATIVE HEART WITHOUT ANGINA PECTORIS: ICD-10-CM

## 2018-03-12 DIAGNOSIS — E11.9 TYPE 2 DIABETES MELLITUS WITHOUT COMPLICATION, WITHOUT LONG-TERM CURRENT USE OF INSULIN (H): ICD-10-CM

## 2018-03-12 DIAGNOSIS — I25.5 ISCHEMIC CARDIOMYOPATHY: ICD-10-CM

## 2018-03-12 LAB
ANION GAP SERPL CALCULATED.3IONS-SCNC: 8 MMOL/L (ref 3–14)
BUN SERPL-MCNC: 17 MG/DL (ref 7–30)
CALCIUM SERPL-MCNC: 8.8 MG/DL (ref 8.5–10.1)
CHLORIDE SERPL-SCNC: 105 MMOL/L (ref 94–109)
CO2 SERPL-SCNC: 27 MMOL/L (ref 20–32)
CREAT SERPL-MCNC: 1.02 MG/DL (ref 0.66–1.25)
GFR SERPL CREATININE-BSD FRML MDRD: 77 ML/MIN/1.7M2
GLUCOSE SERPL-MCNC: 178 MG/DL (ref 70–99)
POTASSIUM SERPL-SCNC: 4.4 MMOL/L (ref 3.4–5.3)
SODIUM SERPL-SCNC: 140 MMOL/L (ref 133–144)

## 2018-03-12 PROCEDURE — T1013 SIGN LANG/ORAL INTERPRETER: HCPCS | Mod: U3,ZF

## 2018-03-12 PROCEDURE — 99214 OFFICE O/P EST MOD 30 MIN: CPT | Mod: ZP | Performed by: NURSE PRACTITIONER

## 2018-03-12 PROCEDURE — 36415 COLL VENOUS BLD VENIPUNCTURE: CPT | Performed by: NURSE PRACTITIONER

## 2018-03-12 PROCEDURE — G0463 HOSPITAL OUTPT CLINIC VISIT: HCPCS | Mod: ZF

## 2018-03-12 PROCEDURE — 80048 BASIC METABOLIC PNL TOTAL CA: CPT | Performed by: NURSE PRACTITIONER

## 2018-03-12 RX ORDER — CETIRIZINE HYDROCHLORIDE 10 MG/1
10 TABLET ORAL DAILY
COMMUNITY
End: 2018-10-01

## 2018-03-12 RX ORDER — POTASSIUM CHLORIDE 1500 MG/1
20 TABLET, EXTENDED RELEASE ORAL EVERY OTHER DAY
Qty: 90 TABLET | Refills: 3 | Status: SHIPPED | OUTPATIENT
Start: 2018-03-12 | End: 2018-03-23 | Stop reason: DRUGHIGH

## 2018-03-12 RX ORDER — LISINOPRIL 2.5 MG/1
2.5 TABLET ORAL 2 TIMES DAILY
Qty: 60 TABLET | Refills: 3 | Status: SHIPPED | OUTPATIENT
Start: 2018-03-12 | End: 2018-03-23

## 2018-03-12 RX ORDER — METFORMIN HCL 500 MG
1000 TABLET, EXTENDED RELEASE 24 HR ORAL
Qty: 180 TABLET | Refills: 3 | COMMUNITY
Start: 2018-03-12 | End: 2019-04-02

## 2018-03-12 ASSESSMENT — PAIN SCALES - GENERAL: PAINLEVEL: NO PAIN (0)

## 2018-03-12 NOTE — NURSING NOTE
Diet: Patient instructed regarding a heart healthy diet, including discussion of reduced fat and sodium intake. Patient demonstrated understanding of this information and agreed to call with further questions or concerns.  Labs: Patient was given results of the laboratory testing obtained today. Patient was instructed to return for the next laboratory testing in 1 week. Patient demonstrated understanding of this information and agreed to call with further questions or concerns.   Return Appointment: Patient given instructions regarding scheduling next clinic visit. Patient demonstrated understanding of this information and agreed to call with further questions or concerns.  Medication Change: Patient was educated regarding prescribed medication change, including discussion of the indication, administration, side effects, and when to report to MD or RN. Patient demonstrated understanding of this information and agreed to call with further questions or concerns.  Patient stated he understood all health information given and agreed to call with further questions or concerns.   Soraya Messina RN

## 2018-03-12 NOTE — MR AVS SNAPSHOT
After Visit Summary   3/12/2018    Luke Henao    MRN: [id number]           Patient Information     Date Of Birth          1965        Visit Information        Provider Department      3/12/2018 11:45 AM Cristobal, Son; Vandana Zambrano NP [facility]        Today's Diagnoses     Ischemic cardiomyopathy        Type 2 diabetes mellitus without complication, without long-term current use of insulin (H)          Care Instructions    You were seen today in the Cardiovascular Clinic at the [facility].     Cardiology Providers you saw during your visit:   Vandana Zambrano NP     Follow up and medication changes:    1. Increase Lisinopril to 2.5 mg two times a day.   2. Decrease Potassium to 20 mEq (1 tab) every other day.   3. Come back to clinic in 1 week with labs prior.     Results for LUKE HENAO (MRN [id number]) as of 3/12/2018 12:17   Ref. Range 3/12/2018 11:04   Sodium Latest Ref Range: 133 - 144 mmol/L 140   Potassium Latest Ref Range: 3.4 - 5.3 mmol/L 4.4   Chloride Latest Ref Range: 94 - 109 mmol/L 105   Carbon Dioxide Latest Ref Range: 20 - 32 mmol/L 27   Urea Nitrogen Latest Ref Range: 7 - 30 mg/dL 17   Creatinine Latest Ref Range: 0.66 - 1.25 mg/dL 1.02   GFR Estimate Latest Ref Range: >60 mL/min/1.7m2 77   GFR Estimate If Black Latest Ref Range: >60 mL/min/1.7m2 >90   Calcium Latest Ref Range: 8.5 - 10.1 mg/dL 8.8   Anion Gap Latest Ref Range: 3 - 14 mmol/L 8   Glucose Latest Ref Range: 70 - 99 mg/dL 178 (H)       Please limit your fluid intake to 2 L (64 ounces) daily.  2 Liters a day = 8.5 cups, or 72 ounces.  Please limit your salt intake to 2 grams a day or less.     If you gain 2# in 24 hours or 5# in one week call Soraya Messina RN so we can adjust your medications as needed over the phone.     Please feel free to call me with any questions or concerns.       Soraya Messina RN  [facility]  Cardiology Care Coordinator-Heart Failure  "Clinic     Questions and [phone].   First press #1 for the University and then press #3 for \"Medical Questions\" to reach us Cardiology Nurses.      On Call Cardiologist for after hours or on weekends: [phone]   option #4 and ask to speak to the on-call Cardiologist. Inform them you are a CORE/heart failure patient at the [place].           If you need a medication refill please contact your pharmacy.  Please allow 3 business days for your refill to be completed.  _______________________________________________________  C.O.R.E. CLINIC Cardiomyopathy, Optimization, Rehabilitation, Education   The C.O.R.E. CLINIC is a heart failure specialty clinic within the [facility] where you will work with specialized nurse practitioners dedicated to helping patients with heart failure carefully adjust medications, receive education, and learn who and when to call if symptoms develop. They specialize in helping you better understand your condition, slow the progression of your disease, improve the length and quality of your life, help you detect future heart problems before they become life threatening, and avoid hospitalizations.  As always, thank you for trusting us with your health care needs!                Follow-ups after your visit        Additional Services     Follow-Up with [facility] ([facility]3]       Friday 3/23 at 9:00 with Vandana                  Your next 10 appointments already scheduled     Mar 19, 2018 10:00 AM CDT   LAB with [facility]   [facility]    [address] [place] [address] [phone]           Please do not eat 10-12 hours before your appointment if you are coming in fasting for labs on lipids, cholesterol, or glucose (sugar). This does not apply to pregnant women. Water, hot tea and black coffee (with nothing added) are okay. Do not drink other fluids, diet soda or chew " gum.            Mar 23, 2018  8:30 AM CDT   Lab with [facility]   [facility] ([facility])    [address]  1st Floor  [place] [facility] [address] [phone]            Mar 23, 2018  9:00 AM CDT   (Arrive by 8:45 AM)   CORE RETURN with Vandana Zambrano NP   [facility] ([facility])    [address]  [address]  [place] [id number] [phone]            Aug 29, 2018  9:30 AM CDT   (Arrive by 9:15 AM)   Implanted Defibulator with  Cv Device 1   [facility] ([facility])    [address]  [address]  [place] [id number] [phone]            Aug 29, 2018 10:00 AM CDT   (Arrive by 9:45 AM)   Return Visit with Amanda Amaral MD   [facility] ([facility])    [address]  [address]  [place] [id number] [phone]              Future tests that were ordered for you today     Open Future Orders        Priority Expected Expires Ordered    Follow-Up with CORE Clinic Routine 3/23/2018 6/17/2018 3/12/2018    Basic metabolic panel Routine 3/19/2018 3/12/2019 3/12/2018            Who to contact     If you have questions or need follow up information about today's clinic visit or your schedule please contact [facility] directly at [phone].  Normal or non-critical lab and imaging results will be communicated to you by MyChart, letter or phone within 4 business days after the clinic has received the results. If you do not hear from us within 7 days, please contact the clinic through [employer] or phone. If you have a critical or abnormal lab result, we will notify you by phone as soon as possible.  Submit refill requests through [employer] or call your pharmacy and they will forward the refill request to us. Please allow 3 business days for your refill to be completed.          Additional Information About Your Visit        [facility]  "Information     [employer] lets you send messages to your doctor, view your test results, renew your prescriptions, schedule appointments and more. To sign up, go to [email or web address] . Click on \"Log in\" on the left side of the screen, which will take you to the Welcome page. Then click on \"Sign up Now\" on the right side of the page.     You will be asked to enter the access code listed below, as well as some personal information. Please follow the directions to create your username and password.     Your access code is: [email or web address]  Expires: 4/3/2018  4:45 PM     Your access code will  in 90 days. If you need help or a new code, please call your [facility] clinic or [phone].        Care EveryWhere ID     This is your [facility] EveryWhere ID. This could be used by other organizations to access your [facility] medical records  [id number]        Your Vitals Were     Pulse Height Pulse Oximetry BMI (Body Mass Index)          90 1.575 m (5' 2\") 99% 22.83 kg/m2         Blood Pressure from Last 3 Encounters:   18 113/78   18 116/80   18 120/85    Weight from Last 3 Encounters:   18 56.6 kg (124 lb 12.8 oz)   18 55.3 kg (122 lb)   18 55.3 kg (122 lb)              We Performed the Following     Follow-Up with CORE Clinic          Today's Medication Changes          These changes are accurate as of 3/12/18 12:32 PM.  If you have any questions, ask your nurse or doctor.               These medicines have changed or have updated prescriptions.        Dose/Directions    lisinopril 2.5 MG tablet   Commonly known as:  PRINIVIL/Zestril   This may have changed:  when to take this   Used for:  Ischemic cardiomyopathy   Changed by:  Vandana Zambrano, NP        Dose:  2.5 mg   Take 1 tablet (2.5 mg) by mouth 2 times daily   Quantity:  60 tablet   Refills:  3       metFORMIN 500 MG 24 hr tablet   Commonly known as:  GLUCOPHAGE-XR   This may have changed:  how much to take   Used for:  Type " 2 diabetes mellitus without complication, without long-term current use of insulin (H)   Changed by:  Vandana Zambrano NP        Dose:  500 mg   Take 1 tablet (500 mg) by mouth daily (with dinner)   Quantity:  180 tablet   Refills:  3       potassium chloride SA 20 MEQ CR tablet   Commonly known as:  K-DUR/KLOR-CON M   This may have changed:  when to take this   Used for:  Ischemic cardiomyopathy   Changed by:  Vandana Zambrano NP        Dose:  20 mEq   Take 1 tablet (20 mEq) by mouth every other day   Quantity:  90 tablet   Refills:  3            Where to get your medicines      These medications were sent to [facility]/pharmacy #[id number] - [facility] [address] [address]     Phone:  [phone]     lisinopril 2.5 MG tablet    potassium chloride SA 20 MEQ CR tablet                Primary Care Provider Office Phone # Fax #    Shanna Church [phone]       [address] [place] [address]        Equal Access to Services     [facility]: Hadii linda ku hadasho Soomaali, waaxda luqadaha, qaybta kaalmada adeegyada, karen taveras hayefra nettles . So [facility] [phone].    ATENCIÓN: Si habla español, tiene a suarez disposición servicios gratuitos de asistencia lingüística. Llame al [phone].    We comply with applicable federal civil rights laws and [place] laws. We do not discriminate on the basis of race, color, national origin, age, disability, sex, sexual orientation, or gender identity.            Thank you!     Thank you for choosing [facility]  for your care. Our goal is always to provide you with excellent care. Hearing back from our patients is one way we can continue to improve our services. Please take a few minutes to complete the written survey that you may receive in the mail after your visit with us. Thank you!             Your Updated Medication List - Protect others around you: Learn how to safely use, store and  throw away your medicines at www.disposemymeds.org.          This list is accurate as of 3/12/18 12:32 PM.  Always use your most recent med list.                   Brand Name Dispense Instructions for use Diagnosis    alcohol swab prep pads     100 each    Use to swab area of injection/mary alice as directed 3 x per day        aspirin 81 MG chewable tablet     36 tablet    Take 1 tablet (81 mg) by mouth daily    Coronary artery disease involving native coronary artery of native heart without angina pectoris       ASPIRIN NOT PRESCRIBED    INTENTIONAL    0 each    Please choose reason not prescribed, below    Cardiogenic shock (H), Type 2 diabetes mellitus without complication, with long-term current use of insulin (H)       atorvastatin 40 MG tablet    LIPITOR    60 tablet    1 tablet (40 mg) by Oral or Feeding Tube route daily    Coronary artery disease involving native coronary artery of native heart without angina pectoris, Cardiogenic shock (H)       Benzoyl Peroxide 2.5 % Crea     21 g    Externally apply topically daily    Acne vulgaris       bumetanide 0.5 MG tablet    BUMEX    45 tablet    Take 1 tablet (0.5 mg) by mouth every other day    Ischemic cardiomyopathy       cetirizine 10 MG tablet    zyrTEC     Take 10 mg by mouth daily        clopidogrel 75 MG tablet    PLAVIX    60 tablet    Take 1 tablet (75 mg) by mouth daily    ST elevation myocardial infarction (STEMI), unspecified artery (H)       digoxin 125 MCG tablet    LANOXIN    60 tablet    Take one tablet on M, W, F, Sunday.    Coronary artery disease involving native coronary artery of native heart without angina pectoris       finasteride 5 MG tablet    PROSCAR    60 tablet    Take 1 tablet (5 mg) by mouth daily    Hypertrophy of prostate with urinary obstruction       glipiZIDE 5 MG tablet    GLUCOTROL    180 tablet    Take 1 tablet (5 mg) by mouth 2 times daily (before meals)    Type 2 diabetes mellitus without complication, without long-term current  use of insulin (H)       insulin pen needle 32G X 4 MM    BD KYLEE U/F    100 each    Use 3 daily or as directed.    Diabetes mellitus type 2, insulin dependent (H)       lisinopril 2.5 MG tablet    PRINIVIL/Zestril    60 tablet    Take 1 tablet (2.5 mg) by mouth 2 times daily    Ischemic cardiomyopathy       metFORMIN 500 MG 24 hr tablet    GLUCOPHAGE-XR    180 tablet    Take 1 tablet (500 mg) by mouth daily (with dinner)    Type 2 diabetes mellitus without complication, without long-term current use of insulin (H)       metoprolol succinate 50 MG 24 hr tablet    TOPROL-XL    90 tablet    Take 1 tablet (50 mg) by mouth daily    Chronic systolic congestive heart failure (H)       ONETOUCH ULTRA test strip   Generic drug:  blood glucose monitoring     100 strip    USE TO TEST BLOOD SUGAR 3 TIMES DAILY OR AS DIRECTED.    Diabetes mellitus type 2, insulin dependent (H)       PANTOPRAZOLE SODIUM PO      Take 40 mg by mouth daily        potassium chloride SA 20 MEQ CR tablet    K-DUR/KLOR-CON M    90 tablet    Take 1 tablet (20 mEq) by mouth every other day    Ischemic cardiomyopathy       Sharps Container Misc     1 each    1 each every 30 days    Diabetes mellitus type 2, insulin dependent (H)       tamsulosin 0.4 MG capsule    FLOMAX    60 capsule    Take 1 capsule (0.4 mg) by mouth daily    Ischemic cardiomyopathy       tretinoin 0.05 % cream    RETIN-A    45 g    Spread a pea size amount into affected area topically at bedtime.  Use sunscreen SPF>20.    Acne vulgaris       Warfarin Therapy Reminder      1 each continuous prn    Deep vein thrombosis (DVT) of left lower extremity, unspecified chronicity, unspecified vein (H)       zinc sulfate 220 (50 ZN) MG capsule    ZINCATE    2 capsule    Take 1 capsule (220 mg) by mouth daily    Ischemic cardiomyopathy

## 2018-03-12 NOTE — NURSING NOTE
Chief Complaint   Patient presents with     Follow Up For     Return CORE; 52yr old male with a h/o chronic systolic HF secondary to ICM with reduced EF 32%. S/P mitraclip presenting for follow-up with labs prior     Vitals were taken and medications were reconciled.   Brit Sylvester RMA  11:43 AM

## 2018-03-12 NOTE — PROGRESS NOTES
HPI:  Luke is a 52 year old gentleman with a past medical history of ICM, CAD with a RCA STEMI s/p PCI x 7 to RCA, left circumflex (now ), and LAD on 3/27/17 complicated by PEDRO, sepsis, sacral ulcer, DVT, bilateral hemispheric infarcts secondary to watershed ischemia, and cardiogenic shock s/p IABP, who was later transitioned to a subclavian balloon pump and s/p mitral clip for ischemic MR  who returns for routine follow up.    Luke has been feeling great.  He has been tolerating his medications well.  He walks several times daily.  Denies SOB, RAYMOND, PND, orthopnea, edema, chest pain, palpitations, lightheadedness, dizziness, near syncopal/syncopal episodes.  Weight has been relatively stable.  He is following his sodium and fluid restrictions.    PAST MEDICAL HISTORY:  Past Medical History:   Diagnosis Date     CAD (coronary artery disease) 2017    RCA STEMI s/p balloon angioplasty and multiple Sofie to RCA, LCx, and LAD     Cardiogenic shock (H)      DVT (deep venous thrombosis) (H) 2017    left internal jugular (line-associated); left common femoral; on coumadin     Ischemic cardiomyopathy 2017    EF 30-35%     Ischemic stroke (H) 2017    no residual deficits     Lower GI bleed 2017    due to rectal ulcer     Mitral valve insufficiency, ischemic 2017    s/p Mitral Clip placement      Skin ulcer of sacrum (H)      Systolic heart failure (H)      Type 2 diabetes mellitus (H)        FAMILY HISTORY:  Family History   Problem Relation Age of Onset     Hypertension Mother      Type 2 Diabetes Father      Hypertension Father      Myocardial Infarction No family hx of      CEREBROVASCULAR DISEASE No family hx of      Coronary Artery Disease Early Onset No family hx of      Colon Cancer No family hx of      Prostate Cancer No family hx of        SOCIAL HISTORY:  Social History     Social History     Marital status:      Spouse name: N/A     Number of children: N/A     Years of education: N/A     Occupational  History     [employer]      Social History Main Topics     Smoking status: Former Smoker     Packs/day: 0.50     Years: 30.00     Types: Cigarettes     Quit date: 1/1/2017     Smokeless tobacco: Never Used     Alcohol use No     Drug use: No     Sexual activity: Not Currently     Other Topics Concern     None     Social History Narrative    . Remarried.    4 children with first marriage.    2 grand children.    Walks daily, but no formal exercise.            CURRENT MEDICATIONS:  Current Outpatient Prescriptions   Medication Sig Dispense Refill     lisinopril (PRINIVIL/ZESTRIL) 2.5 MG tablet Take 1 tablet (2.5 mg) by mouth daily 30 tablet 3     metoprolol succinate (TOPROL-XL) 50 MG 24 hr tablet Take 1 tablet (50 mg) by mouth daily 90 tablet 3     digoxin (LANOXIN) 125 MCG tablet Take one tablet on M, W, F, Sunday. 60 tablet 5     aspirin 81 MG chewable tablet Take 1 tablet (81 mg) by mouth daily 36 tablet 3     metFORMIN (GLUCOPHAGE-XR) 500 MG 24 hr tablet Take 2 tablets (1,000 mg) by mouth daily (with dinner) 180 tablet 3     glipiZIDE (GLUCOTROL) 5 MG tablet Take 1 tablet (5 mg) by mouth 2 times daily (before meals) 180 tablet 3     Benzoyl Peroxide 2.5 % CREA Externally apply topically daily 21 g 11     tretinoin (RETIN-A) 0.05 % cream Spread a pea size amount into affected area topically at bedtime.  Use sunscreen SPF>20. 45 g 11     potassium chloride SA (K-DUR/KLOR-CON M) 20 MEQ CR tablet Take 1 tablet (20 mEq) by mouth daily 90 tablet 3     bumetanide (BUMEX) 0.5 MG tablet Take 1 tablet (0.5 mg) by mouth every other day 45 tablet 3     ONETOUCH ULTRA test strip USE TO TEST BLOOD SUGAR 3 TIMES DAILY OR AS DIRECTED. 100 strip 3     insulin pen needle (BD KYLEE U/F) 32G X 4 MM Use 3 daily or as directed. 100 each prn     alcohol swab prep pads Use to swab area of injection/mary alice as directed 3 x per day 100 each 11     atorvastatin (LIPITOR) 40 MG tablet 1 tablet (40 mg) by Oral or Feeding Tube  "route daily 60 tablet 7     clopidogrel (PLAVIX) 75 MG tablet Take 1 tablet (75 mg) by mouth daily 60 tablet 11     finasteride (PROSCAR) 5 MG tablet Take 1 tablet (5 mg) by mouth daily 60 tablet 11     ASPIRIN NOT PRESCRIBED (INTENTIONAL) Please choose reason not prescribed, below 0 each 0     tamsulosin (FLOMAX) 0.4 MG capsule Take 1 capsule (0.4 mg) by mouth daily 60 capsule 0     zinc sulfate (ZINCATE) 220 (50 ZN) MG capsule Take 1 capsule (220 mg) by mouth daily 2 capsule 0     Sharps Container MISC 1 each every 30 days 1 each 0     Warfarin Therapy Reminder 1 each continuous prn         ROS:  Constitutional: Denies fever, chills, or diaphoresis.  No significant weight gain/loss.   ENT: Denies visual disturbance, hearing loss, epistaxis, vertigo   Respiratory:  See HPI  Cardiovascular: As per HPI.   GI: Denies nausea, vomiting, diarrhea, hematemesis, melena, or hematochezia.   : Denies urinary frequency, dysuria, or hematuria.   Integument: +Acne.  Negative for bruising, rash, or pruritis.  Psychiatric: Negative for anxiety, depression, sleep disturbance, or mood changes.   Neuro: Negative for headaches, syncope, numbness or tingling.   Endocrinology: +DM   Musculoskeletal: Negative for gout, joint stiffness, swelling, or muscle weakness    EXAM:  Ht 1.575 m (5' 2\")  Wt 56.6 kg (124 lb 12.8 oz)  BMI 22.83 kg/m2  General: alert, articulate, and in no acute distress.  HEENT: normocephalic, atraumatic, anicteric sclera, EOMI, normal palate, mucosa moist, dentition intact, no cyanosis.    Neck: neck supple.  No adenopathy, masses, or carotid bruits.  JVP just below the clavicle.  Heart: regular rhythm, normal S1/S2, no murmur, gallop, rub.  Precordium quiet with normal PMI.     Lungs: clear, no rales, ronchi, or wheezing.  No accessory muscle use, respirations unlabored.   Abdomen: soft, non-tender, bowel sounds present, no hepatomegaly  Extremities: no clubbing, cyanosis or edema.  2+ pedal pulses. "   Neurological: alert and oriented x 3.  normal speech and affect, no gross motor deficits  Skin:  No ecchymoses, rashes, or clubbing.    Labs:  CBC RESULTS:  Lab Results   Component Value Date    WBC 11.2 (H) 10/27/2017    RBC 4.48 10/27/2017    HGB 13.5 10/27/2017    HCT 42.2 10/27/2017    MCV 94 10/27/2017    MCH 30.1 10/27/2017    MCHC 32.0 10/27/2017    RDW 13.0 10/27/2017     10/27/2017       CMP RESULTS:  Lab Results   Component Value Date     03/12/2018    POTASSIUM 4.4 03/12/2018    CHLORIDE 105 03/12/2018    CO2 27 03/12/2018    ANIONGAP 8 03/12/2018     (H) 03/12/2018    BUN 17 03/12/2018    CR 1.02 03/12/2018    GFRESTIMATED 77 03/12/2018    GFRESTBLACK >90 03/12/2018    ROB 8.8 03/12/2018    BILITOTAL 0.6 09/26/2017    ALBUMIN 4.1 09/26/2017    ALKPHOS 63 09/26/2017    ALT 19 09/26/2017    AST 15 09/26/2017        INR RESULTS:  Lab Results   Component Value Date    INR 2.3 (A) 12/26/2017    INR 2.36 (H) 11/03/2017       No components found for: CK  Lab Results   Component Value Date    MAG 2.4 (H) 06/23/2017     Lab Results   Component Value Date    NTBNP 1599 (H) 09/11/2017       Most recent echocardiogram:  12/15/17  Interpretation Summary  Moderately reduced systolic function. EF 30-35%. Global hypokinesis with akinesis of the basal-mid inferior and basal-mid inferolateral walls.  Global right ventricular function is mildly reduced.  Status post transcatheter mitral valve repair with Mitraclip. The clip is attached to the leaflets. There is trace to mild mitral regurgitation. Mean  gradient 5 mmHg at heart rate 97 bpm.  Pulmonary hypertension with right ventricular systolic pressure 41mmHg above the right atrial pressure present.  No pericardial effusion.     Compared to the prior study 8/10/17 there has been no significant change.      Assessment and Plan:    In summary, Mr. Henao is a 52 year old gentleman with ICM who appears to be doing very well overall.  His main concern today  is his acne.  The benzol peroxide that his primary MD prescribed is too drying for him and he would like another suggestion.  I will defer his acne management to his primary MD for alternative treatment.     In regards to his heart failure, I have increased his lisinopril to 2.5 mg twice daily and decreased his potassium to 20 meq every other day (when he takes Bumex).  He will return to CORE clinic in one week with labs prior to continue up titrating his medications further.    1.  Chronic a heart failure secondary to ICM.  Stage C  NYHA Class II  ACEi/ARB:  Increase Lisinopril 2.5 mg twice daily.    BB: Continue Metoprolol XL 50 mg daily. Consider increasing at next visit.  Aldosterone antagonist: contraindicated due to transient renal dysfunction with prior attempts  SCD prophylaxis: ICD  Fluid status: euvolemic  Anticoagulation: None.  Warfarin d/c'd at last visit.  Antiplatelet:  ASA dose 81 mg daily.  NSAID use:  Contraindicated.  Avoid use.    2.  CAD:  Stable.  Continue Plavix, ASA, Metoprolol, Lisinopril, and Atorvastatin    3.  Acne:  He has self discontinued benzol peroxide as it is too drying for him.  Using Dove soap to wash his face. Will defer management to primary MD.     4.  DM:  Glucose levels have been in the 's range. Last A1C was 6.0.  He has only been taking 500 mg of Metformin at dinner instead of 1000 mg as his blood sugars have been controlled.  Primary MD is managing.      5.  Follow-up:  CORE clinic in one week with BMP prior.  Follow up with Dr. Amaral in 6 months and repeat SAMEERA in one year.     Vandana VENEGAS, CNP  [facility] Clinic   [phone]

## 2018-03-12 NOTE — PATIENT INSTRUCTIONS
"You were seen today in the Cardiovascular Clinic at the [facility].     Cardiology Providers you saw during your visit:   Vandana Zambrano NP     Follow up and medication changes:    1. Increase Lisinopril to 2.5 mg two times a day.   2. Decrease Potassium to 20 mEq (1 tab) every other day.   3. Come back to clinic in 1 week with labs prior.     Results for SYDNIE SIERRA (MRN [id number]) as of 3/12/2018 12:17   Ref. Range 3/12/2018 11:04   Sodium Latest Ref Range: 133 - 144 mmol/L 140   Potassium Latest Ref Range: 3.4 - 5.3 mmol/L 4.4   Chloride Latest Ref Range: 94 - 109 mmol/L 105   Carbon Dioxide Latest Ref Range: 20 - 32 mmol/L 27   Urea Nitrogen Latest Ref Range: 7 - 30 mg/dL 17   Creatinine Latest Ref Range: 0.66 - 1.25 mg/dL 1.02   GFR Estimate Latest Ref Range: >60 mL/min/1.7m2 77   GFR Estimate If Black Latest Ref Range: >60 mL/min/1.7m2 >90   Calcium Latest Ref Range: 8.5 - 10.1 mg/dL 8.8   Anion Gap Latest Ref Range: 3 - 14 mmol/L 8   Glucose Latest Ref Range: 70 - 99 mg/dL 178 (H)       Please limit your fluid intake to 2 L (64 ounces) daily.  2 Liters a day = 8.5 cups, or 72 ounces.  Please limit your salt intake to 2 grams a day or less.     If you gain 2# in 24 hours or 5# in one week call Soraya Messina RN so we can adjust your medications as needed over the phone.     Please feel free to call me with any questions or concerns.       Soraya Messina RN  [facility]  Cardiology Care Coordinator-Heart Failure Clinic     Questions and [phone].   First press #1 for the [place] and then press #3 for \"Medical Questions\" to reach us Cardiology Nurses.      On Call Cardiologist for after hours or on weekends: [phone]   option #4 and ask to speak to the on-call Cardiologist. Inform them you are a CORE/heart failure patient at the [place].           If you need a medication refill please contact your pharmacy.  Please allow 3 business days for " your refill to be completed.  _______________________________________________________  [facility]IN[facility] Cardiomyopathy, Optimization, Rehabilitation, Education   The C.O.R.E. CLINIC is a heart failure specialty clinic within the [facility] where you will work with specialized nurse practitioners dedicated to helping patients with heart failure carefully adjust medications, receive education, and learn who and when to call if symptoms develop. They specialize in helping you better understand your condition, slow the progression of your disease, improve the length and quality of your life, help you detect future heart problems before they become life threatening, and avoid hospitalizations.  As always, thank you for trusting us with your health care needs!

## 2018-03-12 NOTE — LETTER
3/12/2018      RE: Luke Henao  [address]  [facility] [address]       Dear Colleague,    Thank you for the opportunity to participate in the care of your patient, Luke Henao, at the [facility] at [facility]. Please see a copy of my visit note below.      HPI:  Luke is a 52 year old gentleman with a past medical history of ICM, CAD with a RCA STEMI s/p PCI x 7 to RCA, left circumflex (now ), and LAD on 3/27/17 complicated by PEDRO, sepsis, sacral ulcer, DVT, bilateral hemispheric infarcts secondary to watershed ischemia, and cardiogenic shock s/p IABP, who was later transitioned to a subclavian balloon pump and s/p mitral clip for ischemic MR  who returns for routine follow up.    Luke has been feeling great.  He has been tolerating his medications well.  He walks several times daily.  Denies SOB, RAYMOND, PND, orthopnea, edema, chest pain, palpitations, lightheadedness, dizziness, near syncopal/syncopal episodes.  Weight has been relatively stable.  He is following his sodium and fluid restrictions.    PAST MEDICAL HISTORY:  Past Medical History:   Diagnosis Date     CAD (coronary artery disease) 2017    RCA STEMI s/p balloon angioplasty and multiple Sofie to RCA, LCx, and LAD     Cardiogenic shock (H)      DVT (deep venous thrombosis) (H) 2017    left internal jugular (line-associated); left common femoral; on coumadin     Ischemic cardiomyopathy 2017    EF 30-35%     Ischemic stroke (H) 2017    no residual deficits     Lower GI bleed 2017    due to rectal ulcer     Mitral valve insufficiency, ischemic 2017    s/p Mitral Clip placement      Skin ulcer of sacrum (H)      Systolic heart failure (H)      Type 2 diabetes mellitus (H)        FAMILY HISTORY:  Family History   Problem Relation Age of Onset     Hypertension Mother      Type 2 Diabetes Father      Hypertension Father      Myocardial Infarction No family hx of      CEREBROVASCULAR DISEASE No family hx of       Coronary Artery Disease Early Onset No family hx of      Colon Cancer No family hx of      Prostate Cancer No family hx of        SOCIAL HISTORY:  Social History     Social History     Marital status:      Spouse name: N/A     Number of children: N/A     Years of education: N/A     Occupational History     H[employer]      Social History Main Topics     Smoking status: Former Smoker     Packs/day: 0.50     Years: 30.00     Types: Cigarettes     Quit date: 1/1/2017     Smokeless tobacco: Never Used     Alcohol use No     Drug use: No     Sexual activity: Not Currently     Other Topics Concern     None     Social History Narrative    . Remarried.    4 children with first marriage.    2 grand children.    Walks daily, but no formal exercise.            CURRENT MEDICATIONS:  Current Outpatient Prescriptions   Medication Sig Dispense Refill     lisinopril (PRINIVIL/ZESTRIL) 2.5 MG tablet Take 1 tablet (2.5 mg) by mouth daily 30 tablet 3     metoprolol succinate (TOPROL-XL) 50 MG 24 hr tablet Take 1 tablet (50 mg) by mouth daily 90 tablet 3     digoxin (LANOXIN) 125 MCG tablet Take one tablet on M, W, F, Sunday. 60 tablet 5     aspirin 81 MG chewable tablet Take 1 tablet (81 mg) by mouth daily 36 tablet 3     metFORMIN (GLUCOPHAGE-XR) 500 MG 24 hr tablet Take 2 tablets (1,000 mg) by mouth daily (with dinner) 180 tablet 3     glipiZIDE (GLUCOTROL) 5 MG tablet Take 1 tablet (5 mg) by mouth 2 times daily (before meals) 180 tablet 3     Benzoyl Peroxide 2.5 % CREA Externally apply topically daily 21 g 11     tretinoin (RETIN-A) 0.05 % cream Spread a pea size amount into affected area topically at bedtime.  Use sunscreen SPF>20. 45 g 11     potassium chloride SA (K-DUR/KLOR-CON M) 20 MEQ CR tablet Take 1 tablet (20 mEq) by mouth daily 90 tablet 3     bumetanide (BUMEX) 0.5 MG tablet Take 1 tablet (0.5 mg) by mouth every other day 45 tablet 3     ONETOUCH ULTRA test strip USE TO TEST BLOOD SUGAR 3  "TIMES DAILY OR AS DIRECTED. 100 strip 3     insulin pen needle (BD KYLEE U/F) 32G X 4 MM Use 3 daily or as directed. 100 each prn     alcohol swab prep pads Use to swab area of injection/mary alice as directed 3 x per day 100 each 11     atorvastatin (LIPITOR) 40 MG tablet 1 tablet (40 mg) by Oral or Feeding Tube route daily 60 tablet 7     clopidogrel (PLAVIX) 75 MG tablet Take 1 tablet (75 mg) by mouth daily 60 tablet 11     finasteride (PROSCAR) 5 MG tablet Take 1 tablet (5 mg) by mouth daily 60 tablet 11     ASPIRIN NOT PRESCRIBED (INTENTIONAL) Please choose reason not prescribed, below 0 each 0     tamsulosin (FLOMAX) 0.4 MG capsule Take 1 capsule (0.4 mg) by mouth daily 60 capsule 0     zinc sulfate (ZINCATE) 220 (50 ZN) MG capsule Take 1 capsule (220 mg) by mouth daily 2 capsule 0     Sharps Container MISC 1 each every 30 days 1 each 0     Warfarin Therapy Reminder 1 each continuous prn         ROS:  Constitutional: Denies fever, chills, or diaphoresis.  No significant weight gain/loss.   ENT: Denies visual disturbance, hearing loss, epistaxis, vertigo   Respiratory:  See HPI  Cardiovascular: As per HPI.   GI: Denies nausea, vomiting, diarrhea, hematemesis, melena, or hematochezia.   : Denies urinary frequency, dysuria, or hematuria.   Integument: +Acne.  Negative for bruising, rash, or pruritis.  Psychiatric: Negative for anxiety, depression, sleep disturbance, or mood changes.   Neuro: Negative for headaches, syncope, numbness or tingling.   Endocrinology: +DM   Musculoskeletal: Negative for gout, joint stiffness, swelling, or muscle weakness    EXAM:  Ht 1.575 m (5' 2\")  Wt 56.6 kg (124 lb 12.8 oz)  BMI 22.83 kg/m2  General: alert, articulate, and in no acute distress.  HEENT: normocephalic, atraumatic, anicteric sclera, EOMI, normal palate, mucosa moist, dentition intact, no cyanosis.    Neck: neck supple.  No adenopathy, masses, or carotid bruits.  JVP just below the clavicle.  Heart: regular rhythm, " normal S1/S2, no murmur, gallop, rub.  Precordium quiet with normal PMI.     Lungs: clear, no rales, ronchi, or wheezing.  No accessory muscle use, respirations unlabored.   Abdomen: soft, non-tender, bowel sounds present, no hepatomegaly  Extremities: no clubbing, cyanosis or edema.  2+ pedal pulses.   Neurological: alert and oriented x 3.  normal speech and affect, no gross motor deficits  Skin:  No ecchymoses, rashes, or clubbing.    Labs:  CBC RESULTS:  Lab Results   Component Value Date    WBC 11.2 (H) 10/27/2017    RBC 4.48 10/27/2017    HGB 13.5 10/27/2017    HCT 42.2 10/27/2017    MCV 94 10/27/2017    MCH 30.1 10/27/2017    MCHC 32.0 10/27/2017    RDW 13.0 10/27/2017     10/27/2017       CMP RESULTS:  Lab Results   Component Value Date     03/12/2018    POTASSIUM 4.4 03/12/2018    CHLORIDE 105 03/12/2018    CO2 27 03/12/2018    ANIONGAP 8 03/12/2018     (H) 03/12/2018    BUN 17 03/12/2018    CR 1.02 03/12/2018    GFRESTIMATED 77 03/12/2018    GFRESTBLACK >90 03/12/2018    ROB 8.8 03/12/2018    BILITOTAL 0.6 09/26/2017    ALBUMIN 4.1 09/26/2017    ALKPHOS 63 09/26/2017    ALT 19 09/26/2017    AST 15 09/26/2017        INR RESULTS:  Lab Results   Component Value Date    INR 2.3 (A) 12/26/2017    INR 2.36 (H) 11/03/2017       No components found for: CK  Lab Results   Component Value Date    MAG 2.4 (H) 06/23/2017     Lab Results   Component Value Date    NTBNP 1599 (H) 09/11/2017       Most recent echocardiogram:  12/15/17  Interpretation Summary  Moderately reduced systolic function. EF 30-35%. Global hypokinesis with akinesis of the basal-mid inferior and basal-mid inferolateral walls.  Global right ventricular function is mildly reduced.  Status post transcatheter mitral valve repair with Mitraclip. The clip is attached to the leaflets. There is trace to mild mitral regurgitation. Mean  gradient 5 mmHg at heart rate 97 bpm.  Pulmonary hypertension with right ventricular systolic pressure  41mmHg above the right atrial pressure present.  No pericardial effusion.     Compared to the prior study 8/10/17 there has been no significant change.      Assessment and Plan:    In summary, Mr. Hneao is a 52 year old gentleman with ICM who appears to be doing very well overall.  His main concern today is his acne.  The benzol peroxide that his primary MD prescribed is too drying for him and he would like another suggestion.  I will defer his acne management to his primary MD for alternative treatment.     In regards to his heart failure, I have increased his lisinopril to 2.5 mg twice daily and decreased his potassium to 20 meq every other day (when he takes Bumex).  He will return to CORE clinic in one week with labs prior to continue up titrating his medications further.    1.  Chronic a heart failure secondary to ICM.  Stage C  NYHA Class II  ACEi/ARB:  Increase Lisinopril 2.5 mg twice daily.    BB: Continue Metoprolol XL 50 mg daily. Consider increasing at next visit.  Aldosterone antagonist: contraindicated due to transient renal dysfunction with prior attempts  SCD prophylaxis: ICD  Fluid status: euvolemic  Anticoagulation: None.  Warfarin d/c'd at last visit.  Antiplatelet:  ASA dose 81 mg daily.  NSAID use:  Contraindicated.  Avoid use.    2.  CAD:  Stable.  Continue Plavix, ASA, Metoprolol, Lisinopril, and Atorvastatin    3.  Acne:  He has self discontinued benzol peroxide as it is too drying for him.  Using Dove soap to wash his face. Will defer management to primary MD.     4.  DM:  Glucose levels have been in the 's range. Last A1C was 6.0.  He has only been taking 500 mg of Metformin at dinner instead of 1000 mg as his blood sugars have been controlled.  Primary MD is managing.      5.  Follow-up:  CORE clinic in one week with BMP prior.  Follow up with Dr. Amaral in 6 months and repeat SAMEERA in one year.     Vandana VENEGAS, CNP  [facility]   [phone]          Please do not hesitate  to contact me if you have any questions/concerns.     Sincerely,     Vandana Zambrano NP

## 2018-03-15 ENCOUNTER — ANTICOAGULATION THERAPY VISIT (OUTPATIENT)
Dept: ANTICOAGULATION | Facility: CLINIC | Age: 53
End: 2018-03-15

## 2018-03-15 ENCOUNTER — PRE VISIT (OUTPATIENT)
Dept: CARDIOLOGY | Facility: CLINIC | Age: 53
End: 2018-03-15

## 2018-03-15 DIAGNOSIS — K21.9 ESOPHAGEAL REFLUX: Primary | ICD-10-CM

## 2018-03-15 DIAGNOSIS — I25.5 ISCHEMIC CARDIOMYOPATHY: Primary | ICD-10-CM

## 2018-03-15 DIAGNOSIS — Z79.01 LONG-TERM (CURRENT) USE OF ANTICOAGULANTS: ICD-10-CM

## 2018-03-15 RX ORDER — PANTOPRAZOLE SODIUM 40 MG/1
40 TABLET, DELAYED RELEASE ORAL DAILY
Qty: 30 TABLET | Refills: 0 | Status: SHIPPED | OUTPATIENT
Start: 2018-03-15 | End: 2018-04-18

## 2018-03-15 NOTE — PROGRESS NOTES
Date: 3/15/2018    Time of Call: 12:49 PM     Diagnosis:  Acid reflux     [ VORB ] Ordering provider: Vandana Zambrano  Order: refill 40 mg daily.     Order received by: Soraya Messina RN     Follow-up/additional notes: follow up refills per PCP

## 2018-03-15 NOTE — MR AVS SNAPSHOT
Luke Henao   3/15/2018   Anticoagulation Therapy Visit    Description:  52 year old male   Provider:  Shanna Church MD   Department:  [facility]           INR as of 3/15/2018     Today's INR       Anticoagulation Summary as of 3/15/2018     INR goal 2.0-3.0   Today's INR    Full instructions 1.5 mg on Mon, Wed, Fri; 3 mg all other days   Next INR check     Indications   Cerebrovascular accident (CVA) due to thrombosis of cerebral artery (H) (Resolved) [I63.30]  DVT (deep venous thrombosis) (H) [I82.409]  Long-term (current) use of anticoagulants [Z79.01] [Z79.01]         Anticoagulation Episode Summary     Resolved date 3/15/2018    Resolved reason Therapy  Complete      Contact Numbers     [facility]  Please call  [phone] to cancel and/or reschedule your appointment   Please call  [phone] with any problems or questions regarding your therapy.

## 2018-03-19 DIAGNOSIS — I25.5 ISCHEMIC CARDIOMYOPATHY: ICD-10-CM

## 2018-03-19 LAB
ANION GAP SERPL CALCULATED.3IONS-SCNC: 4 MMOL/L (ref 3–14)
BUN SERPL-MCNC: 22 MG/DL (ref 7–30)
CALCIUM SERPL-MCNC: 9 MG/DL (ref 8.5–10.1)
CHLORIDE SERPL-SCNC: 110 MMOL/L (ref 94–109)
CO2 SERPL-SCNC: 26 MMOL/L (ref 20–32)
CREAT SERPL-MCNC: 1.11 MG/DL (ref 0.66–1.25)
GFR SERPL CREATININE-BSD FRML MDRD: 70 ML/MIN/1.7M2
GLUCOSE SERPL-MCNC: 120 MG/DL (ref 70–99)
POTASSIUM SERPL-SCNC: 4.2 MMOL/L (ref 3.4–5.3)
SODIUM SERPL-SCNC: 140 MMOL/L (ref 133–144)

## 2018-03-19 PROCEDURE — 80048 BASIC METABOLIC PNL TOTAL CA: CPT | Performed by: INTERNAL MEDICINE

## 2018-03-19 PROCEDURE — 36415 COLL VENOUS BLD VENIPUNCTURE: CPT | Performed by: INTERNAL MEDICINE

## 2018-03-23 ENCOUNTER — OFFICE VISIT (OUTPATIENT)
Dept: CARDIOLOGY | Facility: CLINIC | Age: 53
End: 2018-03-23
Attending: NURSE PRACTITIONER
Payer: COMMERCIAL

## 2018-03-23 VITALS
WEIGHT: 126.7 LBS | HEART RATE: 93 BPM | BODY MASS INDEX: 23.32 KG/M2 | HEIGHT: 62 IN | OXYGEN SATURATION: 100 % | DIASTOLIC BLOOD PRESSURE: 91 MMHG | SYSTOLIC BLOOD PRESSURE: 130 MMHG

## 2018-03-23 DIAGNOSIS — I50.22 CHRONIC SYSTOLIC CONGESTIVE HEART FAILURE (H): ICD-10-CM

## 2018-03-23 DIAGNOSIS — I25.5 ISCHEMIC CARDIOMYOPATHY: ICD-10-CM

## 2018-03-23 DIAGNOSIS — I25.5 ISCHEMIC CARDIOMYOPATHY: Primary | ICD-10-CM

## 2018-03-23 DIAGNOSIS — M79.604 BILATERAL LEG PAIN: ICD-10-CM

## 2018-03-23 DIAGNOSIS — M79.605 BILATERAL LEG PAIN: ICD-10-CM

## 2018-03-23 LAB
ANION GAP SERPL CALCULATED.3IONS-SCNC: 7 MMOL/L (ref 3–14)
BUN SERPL-MCNC: 24 MG/DL (ref 7–30)
CALCIUM SERPL-MCNC: 9 MG/DL (ref 8.5–10.1)
CHLORIDE SERPL-SCNC: 104 MMOL/L (ref 94–109)
CO2 SERPL-SCNC: 29 MMOL/L (ref 20–32)
CREAT SERPL-MCNC: 1.22 MG/DL (ref 0.66–1.25)
GFR SERPL CREATININE-BSD FRML MDRD: 63 ML/MIN/1.7M2
GLUCOSE SERPL-MCNC: 129 MG/DL (ref 70–99)
POTASSIUM SERPL-SCNC: 4.4 MMOL/L (ref 3.4–5.3)
SODIUM SERPL-SCNC: 140 MMOL/L (ref 133–144)

## 2018-03-23 PROCEDURE — T1013 SIGN LANG/ORAL INTERPRETER: HCPCS | Mod: U3,ZF

## 2018-03-23 PROCEDURE — 36415 COLL VENOUS BLD VENIPUNCTURE: CPT | Performed by: NURSE PRACTITIONER

## 2018-03-23 PROCEDURE — 80048 BASIC METABOLIC PNL TOTAL CA: CPT | Performed by: NURSE PRACTITIONER

## 2018-03-23 PROCEDURE — G0463 HOSPITAL OUTPT CLINIC VISIT: HCPCS | Mod: ZF

## 2018-03-23 PROCEDURE — 99214 OFFICE O/P EST MOD 30 MIN: CPT | Mod: ZP | Performed by: NURSE PRACTITIONER

## 2018-03-23 RX ORDER — LISINOPRIL 2.5 MG/1
TABLET ORAL
Qty: 90 TABLET | Refills: 3 | Status: SHIPPED | OUTPATIENT
Start: 2018-03-23 | End: 2018-04-02

## 2018-03-23 RX ORDER — METOPROLOL SUCCINATE 50 MG/1
75 TABLET, EXTENDED RELEASE ORAL DAILY
Qty: 180 TABLET | Refills: 3 | Status: SHIPPED | OUTPATIENT
Start: 2018-03-23 | End: 2018-04-18

## 2018-03-23 ASSESSMENT — PAIN SCALES - GENERAL: PAINLEVEL: NO PAIN (0)

## 2018-03-23 NOTE — LETTER
3/23/2018      RE: Luke Henao  [address]  [facility] [address]       Dear Colleague,    Thank you for the opportunity to participate in the care of your patient, Luke Henao, at the [facility]. Please see a copy of my visit note below.      HPI:  Luke is a 52 year old gentleman with a past medical history of ICM, CAD with a RCA STEMI s/p PCI x 7 to RCA, left circumflex (now ), and LAD on 3/27/17 complicated by PEDRO, sepsis, sacral ulcer, DVT, bilateral hemispheric infarcts secondary to watershed ischemia, and cardiogenic shock s/p IABP, who was later transitioned to a subclavian balloon pump and s/p mitral clip for ischemic MR  who returns for routine follow up for medication titration.  I last saw him a week ago and increased his lisinopril to 2.5 mg twice daily and decreased his potassium.  He returns to clinic today (per his request) to assess his response to these changes.      Luke has been feeling great.  He has been tolerating his medications well.  He walks several times daily for 15-20 minutes each time.  Denies SOB, RAYMOND, PND, orthopnea, edema, chest pain, palpitations, lightheadedness, dizziness, near syncopal/syncopal episodes.  Weight has been relatively stable.  He is following his sodium and fluid restrictions.  Home BP readings are running in the  110//80 range.  He has had a couple of lower readings in the 99 systolic range and 85/67.    PAST MEDICAL HISTORY:  Past Medical History:   Diagnosis Date     CAD (coronary artery disease) 2017    RCA STEMI s/p balloon angioplasty and multiple Sofie to RCA, LCx, and LAD     Cardiogenic shock (H)      DVT (deep venous thrombosis) (H) 2017    left internal jugular (line-associated); left common femoral; on coumadin     Ischemic cardiomyopathy 2017    EF 30-35%     Ischemic stroke (H) 2017    no residual deficits     Lower GI bleed 2017    due to rectal ulcer     Mitral valve  insufficiency, ischemic 2017    s/p Mitral Clip placement      Skin ulcer of sacrum (H)      Systolic heart failure (H)      Type 2 diabetes mellitus (H)        FAMILY HISTORY:  Family History   Problem Relation Age of Onset     Hypertension Mother      Type 2 Diabetes Father      Hypertension Father      Myocardial Infarction No family hx of      CEREBROVASCULAR DISEASE No family hx of      Coronary Artery Disease Early Onset No family hx of      Colon Cancer No family hx of      Prostate Cancer No family hx of        SOCIAL HISTORY:  Social History     Social History     Marital status:      Spouse name: N/A     Number of children: N/A     Years of education: N/A     Occupational History     [employer]      Social History Main Topics     Smoking status: Former Smoker     Packs/day: 0.50     Years: 30.00     Types: Cigarettes     Quit date: 1/1/2017     Smokeless tobacco: Never Used     Alcohol use No     Drug use: No     Sexual activity: Not Currently     Other Topics Concern     None     Social History Narrative    . Remarried.    4 children with first marriage.    2 grand children.    Walks daily, but no formal exercise.            CURRENT MEDICATIONS:  Current Outpatient Prescriptions   Medication Sig Dispense Refill     pantoprazole (PROTONIX) 40 MG EC tablet Take 1 tablet (40 mg) by mouth daily 30 tablet 0     cetirizine (ZYRTEC) 10 MG tablet Take 10 mg by mouth daily       metFORMIN (GLUCOPHAGE-XR) 500 MG 24 hr tablet Take 1 tablet (500 mg) by mouth daily (with dinner) 180 tablet 3     lisinopril (PRINIVIL/ZESTRIL) 2.5 MG tablet Take 1 tablet (2.5 mg) by mouth 2 times daily 60 tablet 3     potassium chloride SA (K-DUR/KLOR-CON M) 20 MEQ CR tablet Take 1 tablet (20 mEq) by mouth every other day 90 tablet 3     metoprolol succinate (TOPROL-XL) 50 MG 24 hr tablet Take 1 tablet (50 mg) by mouth daily 90 tablet 3     digoxin (LANOXIN) 125 MCG tablet Take one tablet on M, W, F, Sunday. 60  tablet 5     aspirin 81 MG chewable tablet Take 1 tablet (81 mg) by mouth daily (Patient not taking: Reported on 3/12/2018) 36 tablet 3     glipiZIDE (GLUCOTROL) 5 MG tablet Take 1 tablet (5 mg) by mouth 2 times daily (before meals) 180 tablet 3     Benzoyl Peroxide 2.5 % CREA Externally apply topically daily (Patient not taking: Reported on 3/12/2018) 21 g 11     tretinoin (RETIN-A) 0.05 % cream Spread a pea size amount into affected area topically at bedtime.  Use sunscreen SPF>20. (Patient not taking: Reported on 3/12/2018) 45 g 11     bumetanide (BUMEX) 0.5 MG tablet Take 1 tablet (0.5 mg) by mouth every other day 45 tablet 3     ONETOUCH ULTRA test strip USE TO TEST BLOOD SUGAR 3 TIMES DAILY OR AS DIRECTED. 100 strip 3     insulin pen needle (BD KYLEE U/F) 32G X 4 MM Use 3 daily or as directed. (Patient not taking: Reported on 3/12/2018) 100 each prn     alcohol swab prep pads Use to swab area of injection/mary alice as directed 3 x per day (Patient not taking: Reported on 3/12/2018) 100 each 11     atorvastatin (LIPITOR) 40 MG tablet 1 tablet (40 mg) by Oral or Feeding Tube route daily 60 tablet 7     clopidogrel (PLAVIX) 75 MG tablet Take 1 tablet (75 mg) by mouth daily 60 tablet 11     finasteride (PROSCAR) 5 MG tablet Take 1 tablet (5 mg) by mouth daily 60 tablet 11     ASPIRIN NOT PRESCRIBED (INTENTIONAL) Please choose reason not prescribed, below (Patient not taking: Reported on 3/12/2018) 0 each 0     tamsulosin (FLOMAX) 0.4 MG capsule Take 1 capsule (0.4 mg) by mouth daily (Patient not taking: Reported on 3/12/2018) 60 capsule 0     zinc sulfate (ZINCATE) 220 (50 ZN) MG capsule Take 1 capsule (220 mg) by mouth daily 2 capsule 0     Sharps Container MISC 1 each every 30 days (Patient not taking: Reported on 3/12/2018) 1 each 0     Warfarin Therapy Reminder 1 each continuous prn (Patient not taking: Reported on 3/12/2018)         ROS:  Constitutional: Denies fever, chills, or diaphoresis.  No significant weight  "gain/loss.   ENT: Denies visual disturbance, hearing loss, epistaxis, vertigo   Respiratory:  See HPI  Cardiovascular: As per HPI.   GI: Denies nausea, vomiting, diarrhea, hematemesis, melena, or hematochezia.   : Denies urinary frequency, dysuria, or hematuria.   Integument: +Acne.  Negative for bruising, rash, or pruritis.  Psychiatric: Negative for anxiety, depression, sleep disturbance, or mood changes.   Neuro: Negative for headaches, syncope, numbness or tingling.   Endocrinology: +DM  Musculoskeletal: Negative for gout, joint stiffness, swelling, or muscle weakness    EXAM:  BP (!) 130/91 (BP Location: Left arm, Patient Position: Chair, Cuff Size: Adult Regular)  Pulse 93  Ht 1.575 m (5' 2\")  Wt 57.5 kg (126 lb 11.2 oz)  SpO2 100%  BMI 23.17 kg/m2.  .3  General: alert, articulate, and in no acute distress.  HEENT: normocephalic, atraumatic, anicteric sclera, EOMI, normal palate, mucosa moist, dentition intact, no cyanosis.    Neck: neck supple.  No adenopathy, masses, or carotid bruits.  JVP just below the clavicle.  Heart: regular rhythm, normal S1/S2, no murmur, gallop, rub.  Precordium quiet with normal PMI.     Lungs: clear, no rales, ronchi, or wheezing.  No accessory muscle use, respirations unlabored.   Abdomen: soft, non-tender, bowel sounds present, no hepatomegaly  Extremities: no clubbing, cyanosis or edema.  2+ pedal pulses.   Neurological: alert and oriented x 3.  normal speech and affect, no gross motor deficits  Skin:  No ecchymoses or rashes.    Labs:  Lab results reviewed with patient in clinic.  CBC RESULTS:  Lab Results   Component Value Date    WBC 11.2 (H) 10/27/2017    RBC 4.48 10/27/2017    HGB 13.5 10/27/2017    HCT 42.2 10/27/2017    MCV 94 10/27/2017    MCH 30.1 10/27/2017    MCHC 32.0 10/27/2017    RDW 13.0 10/27/2017     10/27/2017       CMP RESULTS:  Lab Results   Component Value Date     03/23/2018    POTASSIUM 4.4 03/23/2018    CHLORIDE 104 03/23/2018 "    CO2 29 03/23/2018    ANIONGAP 7 03/23/2018     (H) 03/23/2018    BUN 24 03/23/2018    CR 1.22 03/23/2018    GFRESTIMATED 63 03/23/2018    GFRESTBLACK 76 03/23/2018    ROB 9.0 03/23/2018    BILITOTAL 0.6 09/26/2017    ALBUMIN 4.1 09/26/2017    ALKPHOS 63 09/26/2017    ALT 19 09/26/2017    AST 15 09/26/2017        INR RESULTS:  Lab Results   Component Value Date    INR 2.3 (A) 12/26/2017    INR 2.36 (H) 11/03/2017       No components found for: CK  Lab Results   Component Value Date    MAG 2.4 (H) 06/23/2017     Lab Results   Component Value Date    NTBNP 1599 (H) 09/11/2017       Most recent echocardiogram:  12/15/17  Interpretation Summary  Moderately reduced systolic function. EF 30-35%. Global hypokinesis with akinesis of the basal-mid inferior and basal-mid inferolateral walls.  Global right ventricular function is mildly reduced.  Status post transcatheter mitral valve repair with Mitraclip. The clip is attached to the leaflets. There is trace to mild mitral regurgitation. Mean  gradient 5 mmHg at heart rate 97 bpm.  Pulmonary hypertension with right ventricular systolic pressure 41mmHg above the right atrial pressure present.  No pericardial effusion.     Compared to the prior study 8/10/17 there has been no significant change.      Assessment and Plan:    In summary, Mr. Henao is a 52 year old gentleman with ICM who continues to feel well. I have increased his Toprol XL to 75 mg daily, discontinued his potassium, and increased his lisinopril to 5 mg in the am and 2.5 mg in the evening.  He will repeat a BMP in one week and return to CORE in one month.       1.  Chronic a heart failure secondary to ICM.  Stage C  NYHA Class II  ACEi/ARB:  Increase Lisinopril 5 mg in the am and 2.5 mg in the evening  BB: Increase Metoprolol XL 75 mg daily.   Aldosterone antagonist: contraindicated due to transient renal dysfunction with prior attempts  SCD prophylaxis: ICD  Fluid status: euvolemic  Anticoagulation: None.      Antiplatelet:  ASA dose 81 mg daily.  NSAID use:  Contraindicated.  Avoid use.    2.  CAD:  Stable.  Continue Plavix, ASA, Metoprolol, Lisinopril, and Atorvastatin    3.  DM:  Glucose levels have been in the 's range. Last A1C was 6.0.   Primary MD is managing.      4. Bilateral leg fatigue and pain:  Normal Exercise EDIN in December. Still bothersome to patient, although leg fatigue is improving. Will recheck CK level with his blood work next week to make sure it's not related to his statin. Will re-evaluate next week after med increases to see if his symptoms change or worsen with the medication adjustments.    5.  Follow-up:  CORE clinic in one week with BMP prior.  Follow up with Dr. Amaral in 6 months and repeat SAMEERA in one year.     Vandana VENEGAS, [facility]  CORE Clinic   [phone]

## 2018-03-23 NOTE — MR AVS SNAPSHOT
"              After Visit Summary   3/23/2018    Luke Henao    MRN: [id number]           Patient Information     Date Of Birth          1965        Visit Information        Provider Department      3/23/2018 9:00 AM Kellie Cabrera Dawn M, NP  [facility]        Today's Diagnoses     Ischemic cardiomyopathy        Chronic systolic congestive heart failure (H)          Care Instructions    You were seen today in the Cardiovascular Clinic at the [facility].     Cardiology Providers you saw during your visit:  Vandana Zambrano      1.  Stop taking potassium  2.  Increase Lisinopril to 5 mg in the am and 2.5 mg in the evening.  3.  Increase Metoprolol XL to 75 mg daily.   Please make a follow-up CORE/heart failure appt with Vandana Zambrano NP  in _one month__.       Please limit your fluid intake to 2 L (64 ounces) daily.  2 Liters a day = 8.5 cups, or 72 ounces.  Please limit your salt intake to 2 grams a day or less.    If you gain 2# in 24 hours or 5# in one week call Herminia Jiménez RN so we can adjust your medications as needed over the phone.    Please feel free to call me with any questions or concerns.        [facility]  Cardiology Care Coordinator-Heart Failure Clinic    Questions and [phone].   First press #1 for the [facility] and then press #3 for \"Medical Questions\" to reach us Cardiology Nurses.     On Call Cardiologist for after hours or on weekends: [phone]   option #4 and ask to speak to the on-call Cardiologist. Inform them you are a CORE/heart failure patient at the [facility].        If you need a medication refill please contact your pharmacy.  Please allow 3 business days for your refill to be completed.  _______________________________________________________  C.O.R.E. CLINIC Cardiomyopathy, Optimization, Rehabilitation, Education   The C.O.R.E. CLINIC is a heart failure specialty clinic within the [facility] " [facility] where you will work with specialized nurse practitioners dedicated to helping patients with heart failure carefully adjust medications, receive education, and learn who and when to call if symptoms develop. They specialize in helping you better understand your condition, slow the progression of your disease, improve the length and quality of your life, help you detect future heart problems before they become life threatening, and avoid hospitalizations.  As always, thank you for trusting us with your health care needs!              Follow-ups after your visit        Additional Services     Follow-Up with CORE Clinic       Schedule with Vandana Zambrano                  Your next 10 appointments already scheduled     Mar 30, 2018 10:00 AM CDT   LAB with [facility]   [facility]    [address] [id number] [phone]           Please do not eat 10-12 hours before your appointment if you are coming in fasting for labs on lipids, cholesterol, or glucose (sugar). This does not apply to pregnant women. Water, hot tea and black coffee (with nothing added) are okay. Do not drink other fluids, diet soda or chew gum.            Apr 18, 2018  8:30 AM CDT   Lab with [facility]   [facility] ([facility])    [address]  1st Floor  [facility] [address] [phone]            Apr 18, 2018  9:00 AM CDT   (Arrive by 8:45 AM)   CORE RETURN with Vandana Zambrano NP   [facility] ([facility])    [address]  [address] [place] [id number] [phone]            Aug 29, 2018  9:30 AM CDT   (Arrive by 9:15 AM)   Implanted Defibulator with Uc Cv Device 1   [facility] ([facility])    [address]  [address]  [place] [facility] [address] [phone]            Aug 29, 2018 10:00 AM CDT   (Arrive by  "9:45 AM)   Return Visit with Amanda Amaral MD   [facility] ([facility])    [address]  [address] [place] [id number] [phone]              Future tests that were ordered for you today     Open Future Orders        Priority Expected Expires Ordered    Follow-Up with CORE Clinic Routine 3/30/2018 2018 3/23/2018    Basic metabolic panel Routine 3/30/2018 2018 3/23/2018            Who to contact     If you have questions or need follow up information about today's clinic visit or your schedule please contact [facility] directly at [phone].  Normal or non-critical lab and imaging results will be communicated to you by MyChart, letter or phone within 4 business days after the clinic has received the results. If you do not hear from us within 7 days, please contact the clinic through [employer] or phone. If you have a critical or abnormal lab result, we will notify you by phone as soon as possible.  Submit refill requests through [employer] or call your pharmacy and they will forward the refill request to us. Please allow 3 business days for your refill to be completed.          Additional Information About Your Visit        MyChart Information     [employer] lets you send messages to your doctor, view your test results, renew your prescriptions, schedule appointments and more. To sign up, go to [email or web address] . Click on \"Log in\" on the left side of the screen, which will take you to the Welcome page. Then click on \"Sign up Now\" on the right side of the page.     You will be asked to enter the access code listed below, as well as some personal information. Please follow the directions to create your username and password.     Your access code is: [email or web address]  Expires: 4/3/2018  4:45 PM     Your access code will  in 90 days. If you need help or a new code, please call your [facility] clinic or [phone].        Care EveryWhere ID  " "   This is your Care EveryWhere ID. This could be used by other organizations to access your [facility] medical records  [id number]        Your Vitals Were     Pulse Height Pulse Oximetry BMI (Body Mass Index)          93 1.575 m (5' 2\") 100% 23.17 kg/m2         Blood Pressure from Last 3 Encounters:   03/23/18 (!) 130/91   03/12/18 113/78   02/28/18 116/80    Weight from Last 3 Encounters:   03/23/18 57.5 kg (126 lb 11.2 oz)   03/12/18 56.6 kg (124 lb 12.8 oz)   02/28/18 55.3 kg (122 lb)              We Performed the Following     Follow-Up with CORE Clinic          Today's Medication Changes          These changes are accurate as of 3/23/18  9:43 AM.  If you have any questions, ask your nurse or doctor.               These medicines have changed or have updated prescriptions.        Dose/Directions    lisinopril 2.5 MG tablet   Commonly known as:  PRINIVIL/Zestril   This may have changed:    - how much to take  - how to take this  - when to take this  - additional instructions   Used for:  Ischemic cardiomyopathy   Changed by:  Vandana Zambrano NP        Take 5 mg in the am and 2.5 mg in the evening   Quantity:  90 tablet   Refills:  3       metoprolol succinate 50 MG 24 hr tablet   Commonly known as:  TOPROL-XL   This may have changed:  how much to take   Used for:  Chronic systolic congestive heart failure (H)   Changed by:  Vandana Zambrano NP        Dose:  75 mg   Take 1.5 tablets (75 mg) by mouth daily   Quantity:  180 tablet   Refills:  3         Stop taking these medicines if you haven't already. Please contact your care team if you have questions.     potassium chloride SA 20 MEQ CR tablet   Commonly known as:  K-DUR/KLOR-CON M   Stopped by:  Vandana Zambrano NP                Where to get your medicines      These medications were sent to [facility]/pharmacy #[id number] [place], [place] - [address] [address]     Phone:  [phone]     lisinopril 2.5 MG tablet    " metoprolol succinate 50 MG 24 hr tablet                Primary Care Provider Office Phone # Fax #    Shanna Church [phone]       [address] [facility] [address]        Equal Access to Services     CUBA AYALA : Hadii aad ku hadchiloo Soomaali, waaxda luqadaha, qaybta kaalmada adejoyada, karen stewartn shabbir richeyjoann rosen. So [facility] [phone].    ATENCIÓN: Si habla español, tiene a suarez disposición servicios gratuitos de asistencia lingüística. Llame al [phone].    We comply with applicable federal civil rights laws and [place] laws. We do not discriminate on the basis of race, color, national origin, age, disability, sex, sexual orientation, or gender identity.            Thank you!     Thank you for choosing [facility]  for your care. Our goal is always to provide you with excellent care. Hearing back from our patients is one way we can continue to improve our services. Please take a few minutes to complete the written survey that you may receive in the mail after your visit with us. Thank you!             Your Updated Medication List - Protect others around you: Learn how to safely use, store and throw away your medicines at www.disposemymeds.org.          This list is accurate as of 3/23/18  9:43 AM.  Always use your most recent med list.                   Brand Name Dispense Instructions for use Diagnosis    alcohol swab prep pads     100 each    Use to swab area of injection/mary alice as directed 3 x per day        aspirin 81 MG chewable tablet     36 tablet    Take 1 tablet (81 mg) by mouth daily    Coronary artery disease involving native coronary artery of native heart without angina pectoris       ASPIRIN NOT PRESCRIBED    INTENTIONAL    0 each    Please choose reason not prescribed, below    Cardiogenic shock (H), Type 2 diabetes mellitus without complication, with long-term current use of insulin (H)       atorvastatin 40 MG tablet    LIPITOR    60 tablet    1  tablet (40 mg) by Oral or Feeding Tube route daily    Coronary artery disease involving native coronary artery of native heart without angina pectoris, Cardiogenic shock (H)       Benzoyl Peroxide 2.5 % Crea     21 g    Externally apply topically daily    Acne vulgaris       bumetanide 0.5 MG tablet    BUMEX    45 tablet    Take 1 tablet (0.5 mg) by mouth every other day    Ischemic cardiomyopathy       cetirizine 10 MG tablet    zyrTEC     Take 10 mg by mouth daily        clopidogrel 75 MG tablet    PLAVIX    60 tablet    Take 1 tablet (75 mg) by mouth daily    ST elevation myocardial infarction (STEMI), unspecified artery (H)       digoxin 125 MCG tablet    LANOXIN    60 tablet    Take one tablet on M, W, F, Sunday.    Coronary artery disease involving native coronary artery of native heart without angina pectoris       finasteride 5 MG tablet    PROSCAR    60 tablet    Take 1 tablet (5 mg) by mouth daily    Hypertrophy of prostate with urinary obstruction       glipiZIDE 5 MG tablet    GLUCOTROL    180 tablet    Take 1 tablet (5 mg) by mouth 2 times daily (before meals)    Type 2 diabetes mellitus without complication, without long-term current use of insulin (H)       insulin pen needle 32G X 4 MM    BD KYLEE U/F    100 each    Use 3 daily or as directed.    Diabetes mellitus type 2, insulin dependent (H)       lisinopril 2.5 MG tablet    PRINIVIL/Zestril    90 tablet    Take 5 mg in the am and 2.5 mg in the evening    Ischemic cardiomyopathy       metFORMIN 500 MG 24 hr tablet    GLUCOPHAGE-XR    180 tablet    Take 1 tablet (500 mg) by mouth daily (with dinner)    Type 2 diabetes mellitus without complication, without long-term current use of insulin (H)       metoprolol succinate 50 MG 24 hr tablet    TOPROL-XL    180 tablet    Take 1.5 tablets (75 mg) by mouth daily    Chronic systolic congestive heart failure (H)       ONETOUCH ULTRA test strip   Generic drug:  blood glucose monitoring     100 strip    USE TO  TEST BLOOD SUGAR 3 TIMES DAILY OR AS DIRECTED.    Diabetes mellitus type 2, insulin dependent (H)       pantoprazole 40 MG EC tablet    PROTONIX    30 tablet    Take 1 tablet (40 mg) by mouth daily    Esophageal reflux       Sharps Container Misc     1 each    1 each every 30 days    Diabetes mellitus type 2, insulin dependent (H)       tamsulosin 0.4 MG capsule    FLOMAX    60 capsule    Take 1 capsule (0.4 mg) by mouth daily    Ischemic cardiomyopathy       tretinoin 0.05 % cream    RETIN-A    45 g    Spread a pea size amount into affected area topically at bedtime.  Use sunscreen SPF>20.    Acne vulgaris       Warfarin Therapy Reminder      1 each continuous prn    Deep vein thrombosis (DVT) of left lower extremity, unspecified chronicity, unspecified vein (H)       zinc sulfate 220 (50 ZN) MG capsule    ZINCATE    2 capsule    Take 1 capsule (220 mg) by mouth daily    Ischemic cardiomyopathy

## 2018-03-23 NOTE — PROGRESS NOTES
HPI:  Luke is a 52 year old gentleman with a past medical history of ICM, CAD with a RCA STEMI s/p PCI x 7 to RCA, left circumflex (now ), and LAD on 3/27/17 complicated by PEDRO, sepsis, sacral ulcer, DVT, bilateral hemispheric infarcts secondary to watershed ischemia, and cardiogenic shock s/p IABP, who was later transitioned to a subclavian balloon pump and s/p mitral clip for ischemic MR  who returns for routine follow up for medication titration.  I last saw him a week ago and increased his lisinopril to 2.5 mg twice daily and decreased his potassium.  He returns to clinic today (per his request) to assess his response to these changes.      Luke has been feeling great.  He has been tolerating his medications well.  He walks several times daily for 15-20 minutes each time.  Denies SOB, RAYMOND, PND, orthopnea, edema, chest pain, palpitations, lightheadedness, dizziness, near syncopal/syncopal episodes.  Weight has been relatively stable.  He is following his sodium and fluid restrictions.  Home BP readings are running in the  110//80 range.  He has had a couple of lower readings in the 99 systolic range and 85/67.    PAST MEDICAL HISTORY:  Past Medical History:   Diagnosis Date     CAD (coronary artery disease) 2017    RCA STEMI s/p balloon angioplasty and multiple Sofie to RCA, LCx, and LAD     Cardiogenic shock (H)      DVT (deep venous thrombosis) (H) 2017    left internal jugular (line-associated); left common femoral; on coumadin     Ischemic cardiomyopathy 2017    EF 30-35%     Ischemic stroke (H) 2017    no residual deficits     Lower GI bleed 2017    due to rectal ulcer     Mitral valve insufficiency, ischemic 2017    s/p Mitral Clip placement      Skin ulcer of sacrum (H)      Systolic heart failure (H)      Type 2 diabetes mellitus (H)        FAMILY HISTORY:  Family History   Problem Relation Age of Onset     Hypertension Mother      Type 2 Diabetes Father      Hypertension Father      Myocardial  Infarction No family hx of      CEREBROVASCULAR DISEASE No family hx of      Coronary Artery Disease Early Onset No family hx of      Colon Cancer No family hx of      Prostate Cancer No family hx of        SOCIAL HISTORY:  Social History     Social History     Marital status:      Spouse name: N/A     Number of children: N/A     Years of education: N/A     Occupational History     H[employer]      Social History Main Topics     Smoking status: Former Smoker     Packs/day: 0.50     Years: 30.00     Types: Cigarettes     Quit date: 1/1/2017     Smokeless tobacco: Never Used     Alcohol use No     Drug use: No     Sexual activity: Not Currently     Other Topics Concern     None     Social History Narrative    . Remarried.    4 children with first marriage.    2 grand children.    Walks daily, but no formal exercise.            CURRENT MEDICATIONS:  Current Outpatient Prescriptions   Medication Sig Dispense Refill     pantoprazole (PROTONIX) 40 MG EC tablet Take 1 tablet (40 mg) by mouth daily 30 tablet 0     cetirizine (ZYRTEC) 10 MG tablet Take 10 mg by mouth daily       metFORMIN (GLUCOPHAGE-XR) 500 MG 24 hr tablet Take 1 tablet (500 mg) by mouth daily (with dinner) 180 tablet 3     lisinopril (PRINIVIL/ZESTRIL) 2.5 MG tablet Take 1 tablet (2.5 mg) by mouth 2 times daily 60 tablet 3     potassium chloride SA (K-DUR/KLOR-CON M) 20 MEQ CR tablet Take 1 tablet (20 mEq) by mouth every other day 90 tablet 3     metoprolol succinate (TOPROL-XL) 50 MG 24 hr tablet Take 1 tablet (50 mg) by mouth daily 90 tablet 3     digoxin (LANOXIN) 125 MCG tablet Take one tablet on M, W, F, Sunday. 60 tablet 5     aspirin 81 MG chewable tablet Take 1 tablet (81 mg) by mouth daily (Patient not taking: Reported on 3/12/2018) 36 tablet 3     glipiZIDE (GLUCOTROL) 5 MG tablet Take 1 tablet (5 mg) by mouth 2 times daily (before meals) 180 tablet 3     Benzoyl Peroxide 2.5 % CREA Externally apply topically daily  (Patient not taking: Reported on 3/12/2018) 21 g 11     tretinoin (RETIN-A) 0.05 % cream Spread a pea size amount into affected area topically at bedtime.  Use sunscreen SPF>20. (Patient not taking: Reported on 3/12/2018) 45 g 11     bumetanide (BUMEX) 0.5 MG tablet Take 1 tablet (0.5 mg) by mouth every other day 45 tablet 3     ONETOUCH ULTRA test strip USE TO TEST BLOOD SUGAR 3 TIMES DAILY OR AS DIRECTED. 100 strip 3     insulin pen needle (BD KYLEE U/F) 32G X 4 MM Use 3 daily or as directed. (Patient not taking: Reported on 3/12/2018) 100 each prn     alcohol swab prep pads Use to swab area of injection/mary alice as directed 3 x per day (Patient not taking: Reported on 3/12/2018) 100 each 11     atorvastatin (LIPITOR) 40 MG tablet 1 tablet (40 mg) by Oral or Feeding Tube route daily 60 tablet 7     clopidogrel (PLAVIX) 75 MG tablet Take 1 tablet (75 mg) by mouth daily 60 tablet 11     finasteride (PROSCAR) 5 MG tablet Take 1 tablet (5 mg) by mouth daily 60 tablet 11     ASPIRIN NOT PRESCRIBED (INTENTIONAL) Please choose reason not prescribed, below (Patient not taking: Reported on 3/12/2018) 0 each 0     tamsulosin (FLOMAX) 0.4 MG capsule Take 1 capsule (0.4 mg) by mouth daily (Patient not taking: Reported on 3/12/2018) 60 capsule 0     zinc sulfate (ZINCATE) 220 (50 ZN) MG capsule Take 1 capsule (220 mg) by mouth daily 2 capsule 0     Sharps Container MISC 1 each every 30 days (Patient not taking: Reported on 3/12/2018) 1 each 0     Warfarin Therapy Reminder 1 each continuous prn (Patient not taking: Reported on 3/12/2018)         ROS:  Constitutional: Denies fever, chills, or diaphoresis.  No significant weight gain/loss.   ENT: Denies visual disturbance, hearing loss, epistaxis, vertigo   Respiratory:  See HPI  Cardiovascular: As per HPI.   GI: Denies nausea, vomiting, diarrhea, hematemesis, melena, or hematochezia.   : Denies urinary frequency, dysuria, or hematuria.   Integument: +Acne.  Negative for bruising,  "rash, or pruritis.  Psychiatric: Negative for anxiety, depression, sleep disturbance, or mood changes.   Neuro: Negative for headaches, syncope, numbness or tingling.   Endocrinology: +DM  Musculoskeletal: Negative for gout, joint stiffness, swelling, or muscle weakness    EXAM:  BP (!) 130/91 (BP Location: Left arm, Patient Position: Chair, Cuff Size: Adult Regular)  Pulse 93  Ht 1.575 m (5' 2\")  Wt 57.5 kg (126 lb 11.2 oz)  SpO2 100%  BMI 23.17 kg/m2.  .3  General: alert, articulate, and in no acute distress.  HEENT: normocephalic, atraumatic, anicteric sclera, EOMI, normal palate, mucosa moist, dentition intact, no cyanosis.    Neck: neck supple.  No adenopathy, masses, or carotid bruits.  JVP just below the clavicle.  Heart: regular rhythm, normal S1/S2, no murmur, gallop, rub.  Precordium quiet with normal PMI.     Lungs: clear, no rales, ronchi, or wheezing.  No accessory muscle use, respirations unlabored.   Abdomen: soft, non-tender, bowel sounds present, no hepatomegaly  Extremities: no clubbing, cyanosis or edema.  2+ pedal pulses.   Neurological: alert and oriented x 3.  normal speech and affect, no gross motor deficits  Skin:  No ecchymoses or rashes.    Labs:  Lab results reviewed with patient in clinic.  CBC RESULTS:  Lab Results   Component Value Date    WBC 11.2 (H) 10/27/2017    RBC 4.48 10/27/2017    HGB 13.5 10/27/2017    HCT 42.2 10/27/2017    MCV 94 10/27/2017    MCH 30.1 10/27/2017    MCHC 32.0 10/27/2017    RDW 13.0 10/27/2017     10/27/2017       CMP RESULTS:  Lab Results   Component Value Date     03/23/2018    POTASSIUM 4.4 03/23/2018    CHLORIDE 104 03/23/2018    CO2 29 03/23/2018    ANIONGAP 7 03/23/2018     (H) 03/23/2018    BUN 24 03/23/2018    CR 1.22 03/23/2018    GFRESTIMATED 63 03/23/2018    GFRESTBLACK 76 03/23/2018    ROB 9.0 03/23/2018    BILITOTAL 0.6 09/26/2017    ALBUMIN 4.1 09/26/2017    ALKPHOS 63 09/26/2017    ALT 19 09/26/2017    AST 15 " 09/26/2017        INR RESULTS:  Lab Results   Component Value Date    INR 2.3 (A) 12/26/2017    INR 2.36 (H) 11/03/2017       No components found for: CK  Lab Results   Component Value Date    MAG 2.4 (H) 06/23/2017     Lab Results   Component Value Date    NTBNP 1599 (H) 09/11/2017       Most recent echocardiogram:  12/15/17  Interpretation Summary  Moderately reduced systolic function. EF 30-35%. Global hypokinesis with akinesis of the basal-mid inferior and basal-mid inferolateral walls.  Global right ventricular function is mildly reduced.  Status post transcatheter mitral valve repair with Mitraclip. The clip is attached to the leaflets. There is trace to mild mitral regurgitation. Mean  gradient 5 mmHg at heart rate 97 bpm.  Pulmonary hypertension with right ventricular systolic pressure 41mmHg above the right atrial pressure present.  No pericardial effusion.     Compared to the prior study 8/10/17 there has been no significant change.      Assessment and Plan:    In summary, Mr. Henao is a 52 year old gentleman with ICM who continues to feel well. I have increased his Toprol XL to 75 mg daily, discontinued his potassium, and increased his lisinopril to 5 mg in the am and 2.5 mg in the evening.  He will repeat a BMP in one week and return to CORE in one month.       1.  Chronic a heart failure secondary to ICM.  Stage C  NYHA Class II  ACEi/ARB:  Increase Lisinopril 5 mg in the am and 2.5 mg in the evening  BB: Increase Metoprolol XL 75 mg daily.   Aldosterone antagonist: contraindicated due to transient renal dysfunction with prior attempts  SCD prophylaxis: ICD  Fluid status: euvolemic  Anticoagulation: None.     Antiplatelet:  ASA dose 81 mg daily.  NSAID use:  Contraindicated.  Avoid use.    2.  CAD:  Stable.  Continue Plavix, ASA, Metoprolol, Lisinopril, and Atorvastatin    3.  DM:  Glucose levels have been in the 's range. Last A1C was 6.0.   Primary MD is managing.      4. Bilateral leg fatigue and  pain:  Normal Exercise EDIN in December. Still bothersome to patient, although leg fatigue is improving. Will recheck CK level with his blood work next week to make sure it's not related to his statin. Will re-evaluate next week after med increases to see if his symptoms change or worsen with the medication adjustments.    5.  Follow-up:  [facility] clinic in one week with BMP prior.  Follow up with Dr. Amaral in 6 months and repeat SAMEERA in one year.     Vandana VENEGAS, CNP  [facility]   [phone]

## 2018-03-23 NOTE — PATIENT INSTRUCTIONS
"You were seen today in the Cardiovascular Clinic at the [facility].     Cardiology Providers you saw during your visit:  Vandana Zambrano      1.  Stop taking potassium  2.  Increase Lisinopril to 5 mg in the am and 2.5 mg in the evening.  3.  Increase Metoprolol XL to 75 mg daily.   Please make a follow-up CORE/heart failure appt with Vandana Zambrano NP  in _one month__.       Please limit your fluid intake to 2 L (64 ounces) daily.  2 Liters a day = 8.5 cups, or 72 ounces.  Please limit your salt intake to 2 grams a day or less.    If you gain 2# in 24 hours or 5# in one week call Herminia Jiménez RN so we can adjust your medications as needed over the phone.    Please feel free to call me with any questions or concerns.        [facility]  Cardiology Care Coordinator-Heart Failure Clinic    Questions and [phone].   First press #1 for the University and then press #3 for \"Medical Questions\" to reach us Cardiology Nurses.     On Call Cardiologist for after hours or on weekends: [phone]   option #4 and ask to speak to the on-call Cardiologist. Inform them you are a CORE/heart failure patient at the [place].        If you need a medication refill please contact your pharmacy.  Please allow 3 business days for your refill to be completed.  _______________________________________________________  [facility] Cardiomyopathy, Optimization, Rehabilitation, Education   The [facility] is a heart failure specialty clinic within the [facility] where you will work with specialized nurse practitioners dedicated to helping patients with heart failure carefully adjust medications, receive education, and learn who and when to call if symptoms develop. They specialize in helping you better understand your condition, slow the progression of your disease, improve the length and quality of your life, help you detect future heart " problems before they become life threatening, and avoid hospitalizations.  As always, thank you for trusting us with your health care needs!

## 2018-03-26 DIAGNOSIS — I34.0 MITRAL VALVE INSUFFICIENCY, UNSPECIFIED ETIOLOGY: Primary | ICD-10-CM

## 2018-03-26 NOTE — PROGRESS NOTES
Date: 3/26/2018    Time of Call: 9:43 AM     Diagnosis:  S/p MitraCllip procedure, for mitral valve insufficiency     [ TORB ] Ordering provider: Grant Dejesus MD  Order:   Echo     Order received by: Luz Philip RN     Follow-up/additional notes:   Echo ordered for 1 year s/p MitraClip procedure follow up.

## 2018-03-30 DIAGNOSIS — I50.22 CHRONIC SYSTOLIC CONGESTIVE HEART FAILURE (H): ICD-10-CM

## 2018-03-30 DIAGNOSIS — M79.605 BILATERAL LEG PAIN: ICD-10-CM

## 2018-03-30 DIAGNOSIS — M79.604 BILATERAL LEG PAIN: ICD-10-CM

## 2018-03-30 LAB
ANION GAP SERPL CALCULATED.3IONS-SCNC: 10 MMOL/L (ref 3–14)
BUN SERPL-MCNC: 27 MG/DL (ref 7–30)
CALCIUM SERPL-MCNC: 9 MG/DL (ref 8.5–10.1)
CHLORIDE SERPL-SCNC: 104 MMOL/L (ref 94–109)
CK SERPL-CCNC: 119 U/L (ref 30–300)
CO2 SERPL-SCNC: 27 MMOL/L (ref 20–32)
CREAT SERPL-MCNC: 1.52 MG/DL (ref 0.66–1.25)
GFR SERPL CREATININE-BSD FRML MDRD: 49 ML/MIN/1.7M2
GLUCOSE SERPL-MCNC: 84 MG/DL (ref 70–99)
POTASSIUM SERPL-SCNC: 4 MMOL/L (ref 3.4–5.3)
SODIUM SERPL-SCNC: 141 MMOL/L (ref 133–144)

## 2018-03-30 PROCEDURE — 36415 COLL VENOUS BLD VENIPUNCTURE: CPT | Performed by: NURSE PRACTITIONER

## 2018-03-30 PROCEDURE — 82550 ASSAY OF CK (CPK): CPT | Performed by: NURSE PRACTITIONER

## 2018-03-30 PROCEDURE — 80048 BASIC METABOLIC PNL TOTAL CA: CPT | Performed by: NURSE PRACTITIONER

## 2018-04-02 ENCOUNTER — CARE COORDINATION (OUTPATIENT)
Dept: CARDIOLOGY | Facility: CLINIC | Age: 53
End: 2018-04-02

## 2018-04-02 DIAGNOSIS — I25.5 ISCHEMIC CARDIOMYOPATHY: ICD-10-CM

## 2018-04-02 RX ORDER — LISINOPRIL 2.5 MG/1
2.5 TABLET ORAL 2 TIMES DAILY
Qty: 90 TABLET | Refills: 3 | Status: SHIPPED | OUTPATIENT
Start: 2018-04-02 | End: 2018-06-13

## 2018-04-02 RX ORDER — POTASSIUM CHLORIDE 1500 MG/1
20 TABLET, EXTENDED RELEASE ORAL DAILY
Qty: 90 TABLET | Refills: 3 | Status: SHIPPED | OUTPATIENT
Start: 2018-04-02 | End: 2018-05-16

## 2018-04-02 NOTE — PROGRESS NOTES
Spoke with Luke's wife who holds the primary number cell phone. Luke at work and not available during the day. Communicated medication changes to her through .   Date: 4/2/2018    Time of Call: 9:49 AM     Diagnosis:  Heart failure     [ VORB ] Ordering provider: Vandana Zambrano  Order: Decrease Lisinopril to 2.5 mg twice daily. Start Potassium 20 mEq daily. Recheck labs on 4/18 with appt.      Order received by: Soraya Messina RN      Follow-up/additional notes:

## 2018-04-10 ENCOUNTER — PRE VISIT (OUTPATIENT)
Dept: CARDIOLOGY | Facility: CLINIC | Age: 53
End: 2018-04-10

## 2018-04-10 DIAGNOSIS — I25.5 ISCHEMIC CARDIOMYOPATHY: Primary | ICD-10-CM

## 2018-04-17 DIAGNOSIS — K21.9 ESOPHAGEAL REFLUX: ICD-10-CM

## 2018-04-17 RX ORDER — PANTOPRAZOLE SODIUM 40 MG/1
40 TABLET, DELAYED RELEASE ORAL DAILY
Qty: 30 TABLET | Refills: 0 | Status: CANCELLED | OUTPATIENT
Start: 2018-04-17

## 2018-04-18 ENCOUNTER — RADIANT APPOINTMENT (OUTPATIENT)
Dept: CARDIOLOGY | Facility: CLINIC | Age: 53
End: 2018-04-18
Attending: INTERNAL MEDICINE
Payer: COMMERCIAL

## 2018-04-18 ENCOUNTER — OFFICE VISIT (OUTPATIENT)
Dept: CARDIOLOGY | Facility: CLINIC | Age: 53
End: 2018-04-18
Attending: NURSE PRACTITIONER
Payer: COMMERCIAL

## 2018-04-18 VITALS
BODY MASS INDEX: 23.3 KG/M2 | HEART RATE: 86 BPM | HEIGHT: 62 IN | OXYGEN SATURATION: 99 % | SYSTOLIC BLOOD PRESSURE: 126 MMHG | WEIGHT: 126.6 LBS | DIASTOLIC BLOOD PRESSURE: 88 MMHG

## 2018-04-18 DIAGNOSIS — I50.22 CHRONIC SYSTOLIC CONGESTIVE HEART FAILURE (H): Primary | ICD-10-CM

## 2018-04-18 DIAGNOSIS — I25.5 ISCHEMIC CARDIOMYOPATHY: ICD-10-CM

## 2018-04-18 DIAGNOSIS — I34.0 MITRAL VALVE INSUFFICIENCY, UNSPECIFIED ETIOLOGY: ICD-10-CM

## 2018-04-18 DIAGNOSIS — K21.9 GASTROESOPHAGEAL REFLUX DISEASE, ESOPHAGITIS PRESENCE NOT SPECIFIED: ICD-10-CM

## 2018-04-18 LAB
ANION GAP SERPL CALCULATED.3IONS-SCNC: 6 MMOL/L (ref 3–14)
BUN SERPL-MCNC: 34 MG/DL (ref 7–30)
CALCIUM SERPL-MCNC: 9 MG/DL (ref 8.5–10.1)
CHLORIDE SERPL-SCNC: 105 MMOL/L (ref 94–109)
CO2 SERPL-SCNC: 28 MMOL/L (ref 20–32)
CREAT SERPL-MCNC: 1.38 MG/DL (ref 0.66–1.25)
GFR SERPL CREATININE-BSD FRML MDRD: 54 ML/MIN/1.7M2
GLUCOSE SERPL-MCNC: 109 MG/DL (ref 70–99)
POTASSIUM SERPL-SCNC: 4.3 MMOL/L (ref 3.4–5.3)
SODIUM SERPL-SCNC: 139 MMOL/L (ref 133–144)

## 2018-04-18 PROCEDURE — 80048 BASIC METABOLIC PNL TOTAL CA: CPT | Performed by: NURSE PRACTITIONER

## 2018-04-18 PROCEDURE — 36415 COLL VENOUS BLD VENIPUNCTURE: CPT | Performed by: NURSE PRACTITIONER

## 2018-04-18 PROCEDURE — 99214 OFFICE O/P EST MOD 30 MIN: CPT | Mod: ZP | Performed by: NURSE PRACTITIONER

## 2018-04-18 PROCEDURE — G0463 HOSPITAL OUTPT CLINIC VISIT: HCPCS | Mod: 25,ZF

## 2018-04-18 RX ORDER — METOPROLOL SUCCINATE 50 MG/1
100 TABLET, EXTENDED RELEASE ORAL DAILY
Qty: 180 TABLET | Refills: 3 | Status: SHIPPED | OUTPATIENT
Start: 2018-04-18 | End: 2018-05-16

## 2018-04-18 RX ORDER — PANTOPRAZOLE SODIUM 40 MG/1
40 TABLET, DELAYED RELEASE ORAL DAILY
Qty: 30 TABLET | Refills: 0 | Status: SHIPPED | OUTPATIENT
Start: 2018-04-18 | End: 2018-05-23

## 2018-04-18 ASSESSMENT — PAIN SCALES - GENERAL: PAINLEVEL: NO PAIN (0)

## 2018-04-18 NOTE — NURSING NOTE
Diet: Patient instructed regarding a heart healthy diet, including discussion of reduced fat and sodium intake. Patient demonstrated understanding of this information and agreed to call with further questions or concerns.  Return Appointment: Patient given instructions regarding scheduling next clinic visit. Patient demonstrated understanding of this information and agreed to call with further questions or concerns.  Medication Change: Patient was educated regarding prescribed medication change, including discussion of the indication, administration, side effects, and when to report to MD or RN. Patient demonstrated understanding of this information and agreed to call with further questions or concerns.  Patient stated he understood all health information given and agreed to call with further questions or concerns.   Soraya Messina RN

## 2018-04-18 NOTE — NURSING NOTE
Chief Complaint   Patient presents with     Follow Up For     Return CORE; 52yr old male with a h/o chronic systolic HF secondary to ICM with reduced EF 32% presenting for follow-up with labs prior     Vitals were taken and medications were reconciled.    Brit Sylvester RMA  8:27 AM

## 2018-04-18 NOTE — LETTER
4/18/2018      RE: Luke Henao  [address]  [facility] [address]       Dear Colleague,    Thank you for the opportunity to participate in the care of your patient, Luke Henao, at the [facility] at [facility]. Please see a copy of my visit note below.      HPI: Luke is a 52 year old gentleman with a past medical history of ICM, CAD with a RCA STEMI s/p PCI x 7 to RCA, left circumflex (now ), and LAD on 3/27/17 complicated by PEDRO, sepsis, sacral ulcer, DVT, bilateral hemispheric infarcts secondary to watershed ischemia, and cardiogenic shock s/p IABP, who was later transitioned to a subclavian balloon pump and s/p mitral clip for ischemic MR.      I last saw him the end of March and increased his Toprol XL to 75 mg daily, and increased his lisinopril to 5 mg in the am and 2.5 mg in the evening.  His creatinine jumped up to 1.52 with the increase, (previously normal), and I decreased his lisinopril back to 2.5 mg twice daily. He returns for routine follow up.    He is feeling well.  Continues to deny SOB, RAYMOND, PND, orthopnea, edema, weight gain, chest pain, palpitations, lightheadedness, dizziness, near syncopal/syncopal episodes. Is tolerating his medications well. Has been following his sodium restrictions but has only been drinking 8 to 16 ounce glass of water daily.    PAST MEDICAL HISTORY:  Past Medical History:   Diagnosis Date     Acne      CAD (coronary artery disease) 2017    RCA STEMI s/p balloon angioplasty and multiple Sofie to RCA, LCx, and LAD     Cardiogenic shock (H)      DVT (deep venous thrombosis) (H) 2017    left internal jugular (line-associated); left common femoral; on coumadin     Ischemic cardiomyopathy 2017    EF 30-35%     Ischemic stroke (H) 2017    no residual deficits     Lower GI bleed 2017    due to rectal ulcer     Mitral valve insufficiency, ischemic 2017    s/p Mitral Clip placement      Skin ulcer of sacrum (H)      Systolic  heart failure (H)      Type 2 diabetes mellitus (H)        FAMILY HISTORY:  Family History   Problem Relation Age of Onset     Hypertension Mother      Type 2 Diabetes Father      Hypertension Father      Myocardial Infarction No family hx of      CEREBROVASCULAR DISEASE No family hx of      Coronary Artery Disease Early Onset No family hx of      Colon Cancer No family hx of      Prostate Cancer No family hx of        SOCIAL HISTORY:  Social History     Social History     Marital status:      Spouse name: N/A     Number of children: N/A     Years of education: N/A     Occupational History     H[employer]      Social History Main Topics     Smoking status: Former Smoker     Packs/day: 0.50     Years: 30.00     Types: Cigarettes     Quit date: 1/1/2017     Smokeless tobacco: Never Used     Alcohol use No     Drug use: No     Sexual activity: Not Currently     Other Topics Concern     None     Social History Narrative    . Remarried.    4 children with first marriage.    2 grand children.    Walks daily, but no formal exercise.            CURRENT MEDICATIONS:  Current Outpatient Prescriptions   Medication Sig Dispense Refill     atorvastatin (LIPITOR) 40 MG tablet 1 tablet (40 mg) by Oral or Feeding Tube route daily 60 tablet 7     bumetanide (BUMEX) 0.5 MG tablet Take 1 tablet (0.5 mg) by mouth every other day 45 tablet 3     cetirizine (ZYRTEC) 10 MG tablet Take 10 mg by mouth daily       clopidogrel (PLAVIX) 75 MG tablet Take 1 tablet (75 mg) by mouth daily 60 tablet 11     digoxin (LANOXIN) 125 MCG tablet Take one tablet on M, W, F, Sunday. 60 tablet 5     finasteride (PROSCAR) 5 MG tablet Take 1 tablet (5 mg) by mouth daily 60 tablet 11     glipiZIDE (GLUCOTROL) 5 MG tablet Take 1 tablet (5 mg) by mouth 2 times daily (before meals) 180 tablet 3     lisinopril (PRINIVIL/ZESTRIL) 2.5 MG tablet Take 1 tablet (2.5 mg) by mouth 2 times daily 90 tablet 3     metFORMIN (GLUCOPHAGE-XR) 500 MG 24  hr tablet Take 1 tablet (500 mg) by mouth daily (with dinner) 180 tablet 3     metoprolol succinate (TOPROL-XL) 50 MG 24 hr tablet Take 1.5 tablets (75 mg) by mouth daily 180 tablet 3     pantoprazole (PROTONIX) 40 MG EC tablet Take 1 tablet (40 mg) by mouth daily 30 tablet 0     potassium chloride SA (K-DUR/KLOR-CON M) 20 MEQ CR tablet Take 1 tablet (20 mEq) by mouth daily 90 tablet 3     alcohol swab prep pads Use to swab area of injection/mary alice as directed 3 x per day (Patient not taking: Reported on 3/12/2018) 100 each 11     aspirin 81 MG chewable tablet Take 1 tablet (81 mg) by mouth daily (Patient not taking: Reported on 3/12/2018) 36 tablet 3     ASPIRIN NOT PRESCRIBED (INTENTIONAL) Please choose reason not prescribed, below (Patient not taking: Reported on 3/12/2018) 0 each 0     Benzoyl Peroxide 2.5 % CREA Externally apply topically daily (Patient not taking: Reported on 3/12/2018) 21 g 11     insulin pen needle (BD KYLEE U/F) 32G X 4 MM Use 3 daily or as directed. (Patient not taking: Reported on 3/12/2018) 100 each prn     ONETOUCH ULTRA test strip USE TO TEST BLOOD SUGAR 3 TIMES DAILY OR AS DIRECTED. (Patient not taking: Reported on 3/23/2018) 100 strip 3     Sharps Container MISC 1 each every 30 days (Patient not taking: Reported on 3/12/2018) 1 each 0     tamsulosin (FLOMAX) 0.4 MG capsule Take 1 capsule (0.4 mg) by mouth daily (Patient not taking: Reported on 3/12/2018) 60 capsule 0     tretinoin (RETIN-A) 0.05 % cream Spread a pea size amount into affected area topically at bedtime.  Use sunscreen SPF>20. (Patient not taking: Reported on 3/12/2018) 45 g 11     Warfarin Therapy Reminder 1 each continuous prn (Patient not taking: Reported on 3/12/2018)       zinc sulfate (ZINCATE) 220 (50 ZN) MG capsule Take 1 capsule (220 mg) by mouth daily (Patient not taking: Reported on 4/18/2018) 2 capsule 0       ROS:  Constitutional: Denies fever, chills, or diaphoresis. Weight stable.   ENT: Denies visual  "disturbance, hearing loss, epistaxis, vertigo   Respiratory:  See HPI  Cardiovascular: As per HPI.   GI: Denies nausea, vomiting, diarrhea, hematemesis, melena, or hematochezia.   : Denies urinary frequency, dysuria, or hematuria.   Integument: +Acne.  Negative for bruising, rash, or pruritis.  Psychiatric: Negative for anxiety, depression, sleep disturbance, or mood changes.   Neuro: Negative for headaches, syncope, numbness or tingling.   Endocrinology: +DM  Musculoskeletal: Negative for gout, joint stiffness, swelling, or muscle weakness    EXAM:  /88  Pulse 86  Ht 1.575 m (5' 2\")  Wt 57.4 kg (126 lb 9.6 oz)  SpO2 99%  BMI 23.16 kg/m2  General: alert, articulate, and in no acute distress.  HEENT: normocephalic, atraumatic, anicteric sclera, EOMI, normal palate, mucosa dry, dentition intact, no cyanosis.    Neck: neck supple.  No adenopathy, masses, or carotid bruits.  JVP not appreciated.   Heart: regular rhythm, normal S1/S2, no murmur, gallop, rub.  Precordium quiet with normal PMI.     Lungs: clear, no rales, ronchi, or wheezing.  No accessory muscle use, respirations unlabored.   Abdomen: soft, non-tender, bowel sounds present, no hepatomegaly  Extremities: no clubbing, cyanosis or edema.  2+ pedal pulses.   Neurological: alert and oriented x 3.  normal speech and affect, no gross motor deficits  Skin:  No ecchymoses or rashes. Skin turgor dry with mild tenting.    Labs:  CBC RESULTS:  Lab Results   Component Value Date    WBC 11.2 (H) 10/27/2017    RBC 4.48 10/27/2017    HGB 13.5 10/27/2017    HCT 42.2 10/27/2017    MCV 94 10/27/2017    MCH 30.1 10/27/2017    MCHC 32.0 10/27/2017    RDW 13.0 10/27/2017     10/27/2017       CMP RESULTS:  Lab Results   Component Value Date     04/18/2018    POTASSIUM 4.3 04/18/2018    CHLORIDE 105 04/18/2018    CO2 28 04/18/2018    ANIONGAP 6 04/18/2018     (H) 04/18/2018    BUN 34 (H) 04/18/2018    CR 1.38 (H) 04/18/2018    GFRESTIMATED 54 (L) " 2018    GFRESTBLACK 66 2018    ROB 9.0 2018    BILITOTAL 0.6 2017    ALBUMIN 4.1 2017    ALKPHOS 63 2017    ALT 19 2017    AST 15 2017        INR RESULTS:  Lab Results   Component Value Date    INR 2.3 (A) 2017    INR 2.36 (H) 2017       No components found for: CK  Lab Results   Component Value Date    MAG 2.4 (H) 2017     Lab Results   Component Value Date    NTBNP 1599 (H) 2017       Most recent echocardiogram:  Recent Results (from the past 4320 hour(s))   Echocardiogram Complete    Narrative    [id number]  ECH19  [id number]  [id number]^SHANTELL^SYDNEE^           [facility]  Diagnostic and Tretent-3rd Floor  [address] [place] [id number]     Name: SYDNIE SIERRA  MRN: [id number]  : 1965  Study Date: 2018 07:55 AM  Age: 52 yrs  Gender: Male  Patient Location: [facility] [place]  Reason For Study: 1 year s/p MitraClip  Ordering Physician: SYDNEE STINSON  Referring Physician: SYDNEE STINSON  Performed By: RAE Agarwal     BSA: 1.6 m2  Height: 62 in  Weight: 126 lb  BP: 130/91 mmHg  _____________________________________________________________________________  __        Procedure  Echocardiogram with two-dimensional, color and spectral Doppler performed.  _____________________________________________________________________________  __        Interpretation Summary  The Ejection Fraction is estimated at 30-35%. Left ventricular size is normal.  Global right ventricular function is mildly reduced. The right ventricle is  normal size.  s/p MitralClip with MV mPG of 4mmHg at 90 bpm. Trace mitral insufficiency is  present.  Moderate tricuspid insufficiency is present.  This study was compared with the study from 12/15/17 .  There has been no change.  _____________________________________________________________________________  __        Left Ventricle  Left ventricular size is  normal. Left ventricular wall thickness is normal.  The Ejection Fraction is estimated at 30-35%. Grade III or advanced diastolic  dysfunction. Akinesis of the basal to apical inferior, basal infero-lateral  and infero-septal segments.     Right Ventricle  The right ventricle is normal size. Global right ventricular function is  mildly reduced. A pacemaker lead is noted in the right ventricle.     Atria  Both atria appear normal. The atrial septum is intact as assessed by color  Doppler . A pacemaker lead is noted in the right atrium.     Mitral Valve  Trace mitral insufficiency is present. s/p MitralClip with MV mPG of 4mmHg at  90 bpm.        Aortic Valve  Aortic valve is normal in structure and function. The aortic valve is  tricuspid.     Tricuspid Valve  Moderate tricuspid insufficiency is present. The right ventricular systolic  pressure is approximated at 41.7 mmHg plus the right atrial pressure. Etiology  of TR is leaflet restriction by ICD lead.     Pulmonic Valve  The pulmonic valve is normal. Trace pulmonic insufficiency is present.     Vessels  The aorta root is normal. The pulmonary artery is normal. The inferior vena  cava was normal in size with preserved respiratory variability.     Pericardium  No pericardial effusion is present.        Compared to Previous Study  This study was compared with the study from 12/15/17 . There has been no  change.     Attestation  I have personally viewed the imaging and agree with the interpretation and  report as documented by the fellow, Kirby Rodriguez, and/or edited by me.  _____________________________________________________________________________  __     MMode/2D Measurements & Calculations  IVSd: 0.84 cm  LVIDd: 5.0 cm  LVIDs: 4.5 cm  LVPWd: 0.89 cm  FS: 9.6 %  LV mass(C)d: 149.1 grams  LV mass(C)dI: 94.9 grams/m2  Ao root diam: 2.6 cm  asc Aorta Diam: 3.0 cm  LVOT diam: 2.0 cm  LVOT area: 3.1 cm2     EF(MOD-bp): 26.7 %  LA Volume (BP): 42.8 ml     LA Volume  Index (BP): 27.3 ml/m2  RWT: 0.36        Doppler Measurements & Calculations  MV E max frank: 129.0 cm/sec  MV A max frank: 62.4 cm/sec  MV E/A: 2.1  MV max PG: 10.9 mmHg  MV mean PG: 3.6 mmHg  MV V2 VTI: 26.1 cm  MVA(VTI): 1.4 cm2  MV dec time: 0.10 sec  Ao V2 max: 97.9 cm/sec  Ao max P.0 mmHg  Ao V2 mean: 71.0 cm/sec  Ao mean P.3 mmHg  Ao V2 VTI: 18.3 cm  MICHELLE(I,D): 2.0 cm2  MICHELLE(V,D): 2.2 cm2  LV V1 max P.9 mmHg  LV V1 max: 68.9 cm/sec  LV V1 VTI: 12.0 cm  SV(LVOT): 37.2 ml  SI(LVOT): 23.7 ml/m2  PA V2 max: 65.2 cm/sec  PA max P.7 mmHg  PA acc time: 0.13 sec  PI end-d frank: 160.2 cm/sec  TR max frank: 322.8 cm/sec  TR max P.7 mmHg  AV Frank Ratio (DI): 0.70     MICHELLE Index (cm2/m2): 1.3  E/E' av.1  Lateral E/e': 32.3  Medial E/e': 35.8     _____________________________________________________________________________  __           Report approved by: Isabel Love 2018 09:49 AM      Echocardiogram Limited    Narrative    [id number]  ECH11  [id number]  [id number]^MEGHAN^IVA^AMBER           [facility]  Diagnostic and Treamtent-3rd Floor  [address] [place] [address]     Name: SYDNIE SIERRA  MRN: [id number]  : 1965  Study Date: 12/15/2017 10:52 AM  Age: 52 yrs  Gender: Male  Patient Location: [facility] [place]  Reason For Study: s/p Karena Leigha  Ordering Physician: IVA CHRISTIANSON  Referring Physician: IVA CHRISTIANSON  Performed By: Sandra Fields RDCS     BSA: 1.6 m2  Height: 62 in  Weight: 123 lb  BP: 100/74 mmHg  _____________________________________________________________________________  __        Procedure  Limited Echocardiogram with portions of two-dimensional, color and spectral  Doppler performed.  _____________________________________________________________________________  __        Interpretation Summary  Moderately reduced systolic function. EF 30-35%. Global hypokinesis with  akinesis of the basal-mid inferior and basal-mid  inferolateral walls.  Global right ventricular function is mildly reduced.  Status post transcatheter mitral valve repair with Mitraclip. The clip is  attached to the leaflets. There is trace to mild mitral regurgitation. Mean  gradient 5 mmHg at heart rate 97 bpm.  Pulmonary hypertension with right ventricular systolic pressure 41mmHg above  the right atrial pressure present.  No pericardial effusion.     Compared to the prior study 8/10/17 there has been no significant change.  _____________________________________________________________________________  __        Left Ventricle  Left ventricular wall thickness is normal. Mild left ventricular dilation is  present. Global hypokinesis with akinesis of the basal-mid inferior and basal-  mid inferolateral walls. Moderately reduced systolic function. EF 30-35%.  Diastolic function not assessed.     Right Ventricle  The right ventricle is normal size. Global right ventricular function is  mildly reduced. A pacemaker lead is noted in the right ventricle.     Atria  Both atria appear normal.     Mitral Valve  Status post transcatheter mitral valve repair with Mitraclip. The clip is  attached to the leaflets. There is trace to mild mitral regurgitation. Mean  gradient 5 mmHg at heart rate 97 bpm.        Aortic Valve  Aortic valve is normal in structure and function.     Tricuspid Valve  Mild tricuspid insufficiency is present. Right ventricular systolic pressure  is 41mmHg above the right atrial pressure.     Pulmonic Valve  The pulmonic valve is normal.     Vessels  The aorta root is normal. The inferior vena cava was normal in size with  preserved respiratory variability. Estimated mean right atrial pressure is 3  mmHg.     Pericardium  No pericardial effusion is present.        Attestation  I have personally viewed the imaging and agree with the interpretation and  report as documented by the fellow, David Hill, and/or edited by  me.  _____________________________________________________________________________  __  MMode/2D Measurements & Calculations     IVSd: 0.72 cm  LVIDd: 4.9 cm  LVIDs: 4.2 cm  LVPWd: 0.73 cm  FS: 14.2 %  EDV(Teich): 115.5 ml  ESV(Teich): 80.6 ml  LV mass(C)d: 118.2 grams  LV mass(C)dI: 76.1 grams/m2     EF(MOD-bp): 34.1 %  LA Volume (BP): 49.5 ml  LA Volume Index (BP): 31.9 ml/m2  RWT: 0.30           Doppler Measurements & Calculations  MV E max isabel: 126.4 cm/sec  MV A max isabel: 46.5 cm/sec  MV E/A: 2.7  MV max P.7 mmHg  MV mean P.6 mmHg  MV V2 VTI: 26.5 cm  MV dec time: 0.13 sec  TR max isabel: 321.2 cm/sec  TR max P.3 mmHg     _____________________________________________________________________________  __           Report approved by: Isabel Alex 12/15/2017 12:24 PM          Assessment and Plan:    In summary, Luke is a 52 year old gentleman with ICM who appears to be hypovolemic today.  His BUN/creatinine suggest he is dry as well.  I have asked him to increase his water intake and reviewed the 2L fluid restriction with him.  We discussed taking the bumex as needed, but at this time he would prefer to increase his water intake and continue his current diuretic dose as he will be going to [place] to spend time with his children.      Today I have increased his Toprol XL to 100 mg daily.  I will reattempt to increase lisinopril at his next visit in a month depending on renal function, which I suspect with the above measures, will improve.        1.  Chronic a heart failure secondary to ICM.  Stage C NYHA Class II  ACEi/ARB:  Lisinopril  2.5 mg twice daily.  BB: Increase Metoprolol  mg daily.   Aldosterone antagonist: contraindicated due to transient renal dysfunction with prior attempts  SCD prophylaxis: ICD  Fluid status: euvolemic  Anticoagulation: None.     Antiplatelet:  ASA dose 81 mg daily.  NSAID use:  Contraindicated.  Avoid use.     2.  CAD:  Stable.  Continue Plavix, ASA,  Metoprolol, Lisinopril, and Atorvastatin     3.   GERD:  He ran out of his protonix and is planning to go out of town.  I gave him a one month refill with instructions that his primary MD needs to fill going forward.  Note left with the pharmacy with this information.       5.  Follow-up:  [facility] clinic 1 month.  Follow up with Dr. Amaral in 6 months and repeat SAMEERA in one year.     Vandana VENEGAS, CNP  [facility]   [phone]

## 2018-04-18 NOTE — PATIENT INSTRUCTIONS
"You were seen today in the Cardiovascular Clinic at the [facility].     Cardiology Providers you saw during your visit: Vandana VENEGAS CNP      Follow up and medication changes:  1. Increase Metoprolol XL to 100 mg/day  2. Increase fluid intake  3. Follow up in [facility] Clinic in one month - Wednesday May 16th at 12:00 with labs at 11:30        Results for SYDNIE SIERRA (MRN [id number]) as of 2018 08:24   Ref. Range 2018 06:54   Sodium Latest Ref Range: 133 - 144 mmol/L 139   Potassium Latest Ref Range: 3.4 - 5.3 mmol/L 4.3   Chloride Latest Ref Range: 94 - 109 mmol/L 105   Carbon Dioxide Latest Ref Range: 20 - 32 mmol/L 28   Urea Nitrogen Latest Ref Range: 7 - 30 mg/dL 34 (H)   Creatinine Latest Ref Range: 0.66 - 1.25 mg/dL 1.38 (H)   GFR Estimate Latest Ref Range: >60 mL/min/1.7m2 54 (L)   GFR Estimate If Black Latest Ref Range: >60 mL/min/1.7m2 66   Calcium Latest Ref Range: 8.5 - 10.1 mg/dL 9.0   Anion Gap Latest Ref Range: 3 - 14 mmol/L 6   Glucose Latest Ref Range: 70 - 99 mg/dL 109 (H)         Please limit your fluid intake to 2 L (68 ounces) daily.  2 Liters a day = 8.5 cups, or 68 ounces.  Please limit your salt intake to 2 grams a day or less.     If you gain 2# in 24 hours or 5# in one week call Soraya Messina RN so we can adjust your medications as needed over the phone.     Please feel free to call me with any questions or concerns.       Soraya Messina RN  [facility]  Cardiology Care Coordinator-Heart Failure Clinic     Questions and [phone].   First press #1 for the [employer] and then press #3 for \"Medical Questions\" to reach us Cardiology Nurses.      On Call Cardiologist for after hours or on weekends: [phone]   option #4 and ask to speak to the on-call Cardiologist. Inform them you are a CORE/heart failure patient at the [facility].           If you need a medication refill please contact your pharmacy.  Please allow 3 " business days for your refill to be completed.  _______________________________________________________  [facility] Cardiomyopathy, Optimization, Rehabilitation, Education   The [facility]IN[facility] is a heart failure specialty clinic within the [facility] where you will work with specialized nurse practitioners dedicated to helping patients with heart failure carefully adjust medications, receive education, and learn who and when to call if symptoms develop. They specialize in helping you better understand your condition, slow the progression of your disease, improve the length and quality of your life, help you detect future heart problems before they become life threatening, and avoid hospitalizations.  As always, thank you for trusting us with your health care needs!

## 2018-04-18 NOTE — MR AVS SNAPSHOT
After Visit Summary   4/18/2018    Luke Henao    MRN: [id number]           Patient Information     Date Of Birth          1965        Visit Information        Provider Department      4/18/2018 8:45 AM Vandana Zambrano NP; ARNOL,  NICOLE [facility]        Today's Diagnoses     Chronic systolic congestive heart failure (H)        Gastroesophageal reflux disease, esophagitis presence not specified          Care Instructions    You were seen today in the Cardiovascular Clinic at the [facility].     Cardiology Providers you saw during your visit: Vandana VENEGAS, CNP      Follow up and medication changes:  1. Increase Metoprolol XL to 100 mg/day  2. Increase fluid intake  3. Follow up in [facility] in one month - Wednesday May 16th at 12:00 with labs at 11:30        Results for LUKE HENAO (MRN [id number]) as of 4/18/2018 08:24   Ref. Range 4/18/2018 06:54   Sodium Latest Ref Range: 133 - 144 mmol/L 139   Potassium Latest Ref Range: 3.4 - 5.3 mmol/L 4.3   Chloride Latest Ref Range: 94 - 109 mmol/L 105   Carbon Dioxide Latest Ref Range: 20 - 32 mmol/L 28   Urea Nitrogen Latest Ref Range: 7 - 30 mg/dL 34 (H)   Creatinine Latest Ref Range: 0.66 - 1.25 mg/dL 1.38 (H)   GFR Estimate Latest Ref Range: >60 mL/min/1.7m2 54 (L)   GFR Estimate If Black Latest Ref Range: >60 mL/min/1.7m2 66   Calcium Latest Ref Range: 8.5 - 10.1 mg/dL 9.0   Anion Gap Latest Ref Range: 3 - 14 mmol/L 6   Glucose Latest Ref Range: 70 - 99 mg/dL 109 (H)         Please limit your fluid intake to 2 L (68 ounces) daily.  2 Liters a day = 8.5 cups, or 68 ounces.  Please limit your salt intake to 2 grams a day or less.     If you gain 2# in 24 hours or 5# in one week call Soraya Messina RN so we can adjust your medications as needed over the phone.     Please feel free to call me with any questions or concerns.       Soraya Messina RN  [facility]  Cardiology Care  "Coordinator-Heart Failure Clinic     Questions and [phone].   First press #1 for the University and then press #3 for \"Medical Questions\" to reach us Cardiology Nurses.      On Call Cardiologist for after hours or on weekends: [phone]   option #4 and ask to speak to the on-call Cardiologist. Inform them you are a CORE/heart failure patient at the [facility].           If you need a medication refill please contact your pharmacy.  Please allow 3 business days for your refill to be completed.  _______________________________________________________  C.O.R.E. CLINIC Cardiomyopathy, Optimization, Rehabilitation, Education   The C.O.R.E. CLINIC is a heart failure specialty clinic within the [facility] where you will work with specialized nurse practitioners dedicated to helping patients with heart failure carefully adjust medications, receive education, and learn who and when to call if symptoms develop. They specialize in helping you better understand your condition, slow the progression of your disease, improve the length and quality of your life, help you detect future heart problems before they become life threatening, and avoid hospitalizations.  As always, thank you for trusting us with your health care needs!             Follow-ups after your visit        Additional Services     Follow-Up with [facility]       F/u with Vandana in one month.                  Your next 10 appointments already scheduled     Aug 29, 2018  9:30 AM CDT   (Arrive by 9:15 AM)   Implanted Defibulator with Uc Cv Device 1   [facility])    [address] [place] [address]  [address] [place] [id number] [phone]            Aug 29, 2018 10:00 AM CDT   (Arrive by 9:45 AM)   Return Visit with Amanda Amaral MD   [facility])    [address] [place] [address]  [address] [place] [id number] " "  [phone]              Future tests that were ordered for you today     Open Future Orders        Priority Expected Expires Ordered    Follow-Up with [facility] Routine 2018            Who to contact     If you have questions or need follow up information about today's clinic visit or your schedule please contact [facility] directly at [phone].  Normal or non-critical lab and imaging results will be communicated to you by MyChart, letter or phone within 4 business days after the clinic has received the results. If you do not hear from us within 7 days, please contact the clinic through [employer] or phone. If you have a critical or abnormal lab result, we will notify you by phone as soon as possible.  Submit refill requests through [employer] or call your pharmacy and they will forward the refill request to us. Please allow 3 business days for your refill to be completed.          Additional Information About Your Visit        [employer] Information     [employer] lets you send messages to your doctor, view your test results, renew your prescriptions, schedule appointments and more. To sign up, go to [email or web address] . Click on \"Log in\" on the left side of the screen, which will take you to the Welcome page. Then click on \"Sign up Now\" on the right side of the page.     You will be asked to enter the access code listed below, as well as some personal information. Please follow the directions to create your username and password.     Your access code is: [email or web address]  Expires: 2018  6:30 AM     Your access code will  in 90 days. If you need help or a new code, please call your [facility] clinic or [phone].        Care EveryWhere ID     This is your Care EveryWhere ID. This could be used by other organizations to access your [facility] medical records  [id number]        Your Vitals Were     Pulse Height Pulse Oximetry BMI (Body Mass Index)          86 1.575 m (5' " "2\") 99% 23.16 kg/m2         Blood Pressure from Last 3 Encounters:   04/18/18 126/88   03/23/18 (!) 130/91   03/12/18 113/78    Weight from Last 3 Encounters:   04/18/18 57.4 kg (126 lb 9.6 oz)   03/23/18 57.5 kg (126 lb 11.2 oz)   03/12/18 56.6 kg (124 lb 12.8 oz)              We Performed the Following     Follow-Up with [facility]          Today's Medication Changes          These changes are accurate as of 4/18/18  9:31 AM.  If you have any questions, ask your nurse or doctor.               These medicines have changed or have updated prescriptions.        Dose/Directions    metoprolol succinate 50 MG 24 hr tablet   Commonly known as:  TOPROL-XL   This may have changed:  how much to take   Used for:  Chronic systolic congestive heart failure (H)   Changed by:  Vandana Zambrano NP        Dose:  100 mg   Take 2 tablets (100 mg) by mouth daily   Quantity:  180 tablet   Refills:  3            Where to get your medicines      These medications were sent to [facility]/pharmacy #[id number] [address] [address]     Phone:  [phone]     metoprolol succinate 50 MG 24 hr tablet    pantoprazole 40 MG EC tablet                Primary Care Provider Office Phone # Fax #    Shanna Church [phone]       [address] [place] [address]        Equal Access to Services     [facility] : Hadii linda powerso Soericka, waaxda luqadaha, qaybta kaalmada nirmal, karen rosen. So [facility] [phone].    ATENCIÓN: Si habla español, tiene a suarez disposición servicios gratuitos de asistencia lingüística. Llame al [phone].    We comply with applicable federal civil rights laws and [place] laws. We do not discriminate on the basis of race, color, national origin, age, disability, sex, sexual orientation, or gender identity.            Thank you!     Thank you for choosing [facility]  for your care. Our goal is " always to provide you with excellent care. Hearing back from our patients is one way we can continue to improve our services. Please take a few minutes to complete the written survey that you may receive in the mail after your visit with us. Thank you!             Your Updated Medication List - Protect others around you: Learn how to safely use, store and throw away your medicines at www.disposemymeds.org.          This list is accurate as of 4/18/18  9:31 AM.  Always use your most recent med list.                   Brand Name Dispense Instructions for use Diagnosis    alcohol swab prep pads     100 each    Use to swab area of injection/mary alice as directed 3 x per day        aspirin 81 MG chewable tablet     36 tablet    Take 1 tablet (81 mg) by mouth daily    Coronary artery disease involving native coronary artery of native heart without angina pectoris       ASPIRIN NOT PRESCRIBED    INTENTIONAL    0 each    Please choose reason not prescribed, below    Cardiogenic shock (H), Type 2 diabetes mellitus without complication, with long-term current use of insulin (H)       atorvastatin 40 MG tablet    LIPITOR    60 tablet    1 tablet (40 mg) by Oral or Feeding Tube route daily    Coronary artery disease involving native coronary artery of native heart without angina pectoris, Cardiogenic shock (H)       Benzoyl Peroxide 2.5 % Crea     21 g    Externally apply topically daily    Acne vulgaris       bumetanide 0.5 MG tablet    BUMEX    45 tablet    Take 1 tablet (0.5 mg) by mouth every other day    Ischemic cardiomyopathy       cetirizine 10 MG tablet    zyrTEC     Take 10 mg by mouth daily        clopidogrel 75 MG tablet    PLAVIX    60 tablet    Take 1 tablet (75 mg) by mouth daily    ST elevation myocardial infarction (STEMI), unspecified artery (H)       digoxin 125 MCG tablet    LANOXIN    60 tablet    Take one tablet on M, W, F, Sunday.    Coronary artery disease involving native coronary artery of native heart  without angina pectoris       finasteride 5 MG tablet    PROSCAR    60 tablet    Take 1 tablet (5 mg) by mouth daily    Hypertrophy of prostate with urinary obstruction       glipiZIDE 5 MG tablet    GLUCOTROL    180 tablet    Take 1 tablet (5 mg) by mouth 2 times daily (before meals)    Type 2 diabetes mellitus without complication, without long-term current use of insulin (H)       insulin pen needle 32G X 4 MM    BD KYLEE U/F    100 each    Use 3 daily or as directed.    Diabetes mellitus type 2, insulin dependent (H)       lisinopril 2.5 MG tablet    PRINIVIL/Zestril    90 tablet    Take 1 tablet (2.5 mg) by mouth 2 times daily    Ischemic cardiomyopathy       metFORMIN 500 MG 24 hr tablet    GLUCOPHAGE-XR    180 tablet    Take 1 tablet (500 mg) by mouth daily (with dinner)    Type 2 diabetes mellitus without complication, without long-term current use of insulin (H)       metoprolol succinate 50 MG 24 hr tablet    TOPROL-XL    180 tablet    Take 2 tablets (100 mg) by mouth daily    Chronic systolic congestive heart failure (H)       ONETOUCH ULTRA test strip   Generic drug:  blood glucose monitoring     100 strip    USE TO TEST BLOOD SUGAR 3 TIMES DAILY OR AS DIRECTED.    Diabetes mellitus type 2, insulin dependent (H)       pantoprazole 40 MG EC tablet    PROTONIX    30 tablet    Take 1 tablet (40 mg) by mouth daily    Gastroesophageal reflux disease, esophagitis presence not specified       potassium chloride SA 20 MEQ CR tablet    K-DUR/KLOR-CON M    90 tablet    Take 1 tablet (20 mEq) by mouth daily    Ischemic cardiomyopathy       Sharps Container Misc     1 each    1 each every 30 days    Diabetes mellitus type 2, insulin dependent (H)       tamsulosin 0.4 MG capsule    FLOMAX    60 capsule    Take 1 capsule (0.4 mg) by mouth daily    Ischemic cardiomyopathy       tretinoin 0.05 % cream    RETIN-A    45 g    Spread a pea size amount into affected area topically at bedtime.  Use sunscreen SPF>20.    Acne  vulgaris       Warfarin Therapy Reminder      1 each continuous prn    Deep vein thrombosis (DVT) of left lower extremity, unspecified chronicity, unspecified vein (H)       zinc sulfate 220 (50 Zn) MG capsule    ZINCATE    2 capsule    Take 1 capsule (220 mg) by mouth daily    Ischemic cardiomyopathy

## 2018-04-18 NOTE — PROGRESS NOTES
HPI: Luke is a 52 year old gentleman with a past medical history of ICM, CAD with a RCA STEMI s/p PCI x 7 to RCA, left circumflex (now ), and LAD on 3/27/17 complicated by PEDRO, sepsis, sacral ulcer, DVT, bilateral hemispheric infarcts secondary to watershed ischemia, and cardiogenic shock s/p IABP, who was later transitioned to a subclavian balloon pump and s/p mitral clip for ischemic MR.      I last saw him the end of March and increased his Toprol XL to 75 mg daily, and increased his lisinopril to 5 mg in the am and 2.5 mg in the evening.  His creatinine jumped up to 1.52 with the increase, (previously normal), and I decreased his lisinopril back to 2.5 mg twice daily. He returns for routine follow up.    He is feeling well.  Continues to deny SOB, RAYMOND, PND, orthopnea, edema, weight gain, chest pain, palpitations, lightheadedness, dizziness, near syncopal/syncopal episodes. Is tolerating his medications well. Has been following his sodium restrictions but has only been drinking 8 to 16 ounce glass of water daily.    PAST MEDICAL HISTORY:  Past Medical History:   Diagnosis Date     Acne      CAD (coronary artery disease) 2017    RCA STEMI s/p balloon angioplasty and multiple Sofie to RCA, LCx, and LAD     Cardiogenic shock (H)      DVT (deep venous thrombosis) (H) 2017    left internal jugular (line-associated); left common femoral; on coumadin     Ischemic cardiomyopathy 2017    EF 30-35%     Ischemic stroke (H) 2017    no residual deficits     Lower GI bleed 2017    due to rectal ulcer     Mitral valve insufficiency, ischemic 2017    s/p Mitral Clip placement      Skin ulcer of sacrum (H)      Systolic heart failure (H)      Type 2 diabetes mellitus (H)        FAMILY HISTORY:  Family History   Problem Relation Age of Onset     Hypertension Mother      Type 2 Diabetes Father      Hypertension Father      Myocardial Infarction No family hx of      CEREBROVASCULAR DISEASE No family hx of      Coronary Artery  Disease Early Onset No family hx of      Colon Cancer No family hx of      Prostate Cancer No family hx of        SOCIAL HISTORY:  Social History     Social History     Marital status:      Spouse name: N/A     Number of children: N/A     Years of education: N/A     Occupational History     [employer]      Social History Main Topics     Smoking status: Former Smoker     Packs/day: 0.50     Years: 30.00     Types: Cigarettes     Quit date: 1/1/2017     Smokeless tobacco: Never Used     Alcohol use No     Drug use: No     Sexual activity: Not Currently     Other Topics Concern     None     Social History Narrative    . Remarried.    4 children with first marriage.    2 grand children.    Walks daily, but no formal exercise.            CURRENT MEDICATIONS:  Current Outpatient Prescriptions   Medication Sig Dispense Refill     atorvastatin (LIPITOR) 40 MG tablet 1 tablet (40 mg) by Oral or Feeding Tube route daily 60 tablet 7     bumetanide (BUMEX) 0.5 MG tablet Take 1 tablet (0.5 mg) by mouth every other day 45 tablet 3     cetirizine (ZYRTEC) 10 MG tablet Take 10 mg by mouth daily       clopidogrel (PLAVIX) 75 MG tablet Take 1 tablet (75 mg) by mouth daily 60 tablet 11     digoxin (LANOXIN) 125 MCG tablet Take one tablet on M, W, F, Sunday. 60 tablet 5     finasteride (PROSCAR) 5 MG tablet Take 1 tablet (5 mg) by mouth daily 60 tablet 11     glipiZIDE (GLUCOTROL) 5 MG tablet Take 1 tablet (5 mg) by mouth 2 times daily (before meals) 180 tablet 3     lisinopril (PRINIVIL/ZESTRIL) 2.5 MG tablet Take 1 tablet (2.5 mg) by mouth 2 times daily 90 tablet 3     metFORMIN (GLUCOPHAGE-XR) 500 MG 24 hr tablet Take 1 tablet (500 mg) by mouth daily (with dinner) 180 tablet 3     metoprolol succinate (TOPROL-XL) 50 MG 24 hr tablet Take 1.5 tablets (75 mg) by mouth daily 180 tablet 3     pantoprazole (PROTONIX) 40 MG EC tablet Take 1 tablet (40 mg) by mouth daily 30 tablet 0     potassium chloride SA  (K-DUR/KLOR-CON M) 20 MEQ CR tablet Take 1 tablet (20 mEq) by mouth daily 90 tablet 3     alcohol swab prep pads Use to swab area of injection/mary alice as directed 3 x per day (Patient not taking: Reported on 3/12/2018) 100 each 11     aspirin 81 MG chewable tablet Take 1 tablet (81 mg) by mouth daily (Patient not taking: Reported on 3/12/2018) 36 tablet 3     ASPIRIN NOT PRESCRIBED (INTENTIONAL) Please choose reason not prescribed, below (Patient not taking: Reported on 3/12/2018) 0 each 0     Benzoyl Peroxide 2.5 % CREA Externally apply topically daily (Patient not taking: Reported on 3/12/2018) 21 g 11     insulin pen needle (BD KYLEE U/F) 32G X 4 MM Use 3 daily or as directed. (Patient not taking: Reported on 3/12/2018) 100 each prn     ONETOUCH ULTRA test strip USE TO TEST BLOOD SUGAR 3 TIMES DAILY OR AS DIRECTED. (Patient not taking: Reported on 3/23/2018) 100 strip 3     Sharps Container MISC 1 each every 30 days (Patient not taking: Reported on 3/12/2018) 1 each 0     tamsulosin (FLOMAX) 0.4 MG capsule Take 1 capsule (0.4 mg) by mouth daily (Patient not taking: Reported on 3/12/2018) 60 capsule 0     tretinoin (RETIN-A) 0.05 % cream Spread a pea size amount into affected area topically at bedtime.  Use sunscreen SPF>20. (Patient not taking: Reported on 3/12/2018) 45 g 11     Warfarin Therapy Reminder 1 each continuous prn (Patient not taking: Reported on 3/12/2018)       zinc sulfate (ZINCATE) 220 (50 ZN) MG capsule Take 1 capsule (220 mg) by mouth daily (Patient not taking: Reported on 4/18/2018) 2 capsule 0       ROS:  Constitutional: Denies fever, chills, or diaphoresis. Weight stable.   ENT: Denies visual disturbance, hearing loss, epistaxis, vertigo   Respiratory:  See HPI  Cardiovascular: As per HPI.   GI: Denies nausea, vomiting, diarrhea, hematemesis, melena, or hematochezia.   : Denies urinary frequency, dysuria, or hematuria.   Integument: +Acne.  Negative for bruising, rash, or  "pruritis.  Psychiatric: Negative for anxiety, depression, sleep disturbance, or mood changes.   Neuro: Negative for headaches, syncope, numbness or tingling.   Endocrinology: +DM  Musculoskeletal: Negative for gout, joint stiffness, swelling, or muscle weakness    EXAM:  /88  Pulse 86  Ht 1.575 m (5' 2\")  Wt 57.4 kg (126 lb 9.6 oz)  SpO2 99%  BMI 23.16 kg/m2  General: alert, articulate, and in no acute distress.  HEENT: normocephalic, atraumatic, anicteric sclera, EOMI, normal palate, mucosa dry, dentition intact, no cyanosis.    Neck: neck supple.  No adenopathy, masses, or carotid bruits.  JVP not appreciated.   Heart: regular rhythm, normal S1/S2, no murmur, gallop, rub.  Precordium quiet with normal PMI.     Lungs: clear, no rales, ronchi, or wheezing.  No accessory muscle use, respirations unlabored.   Abdomen: soft, non-tender, bowel sounds present, no hepatomegaly  Extremities: no clubbing, cyanosis or edema.  2+ pedal pulses.   Neurological: alert and oriented x 3.  normal speech and affect, no gross motor deficits  Skin:  No ecchymoses or rashes. Skin turgor dry with mild tenting.    Labs:  CBC RESULTS:  Lab Results   Component Value Date    WBC 11.2 (H) 10/27/2017    RBC 4.48 10/27/2017    HGB 13.5 10/27/2017    HCT 42.2 10/27/2017    MCV 94 10/27/2017    MCH 30.1 10/27/2017    MCHC 32.0 10/27/2017    RDW 13.0 10/27/2017     10/27/2017       CMP RESULTS:  Lab Results   Component Value Date     04/18/2018    POTASSIUM 4.3 04/18/2018    CHLORIDE 105 04/18/2018    CO2 28 04/18/2018    ANIONGAP 6 04/18/2018     (H) 04/18/2018    BUN 34 (H) 04/18/2018    CR 1.38 (H) 04/18/2018    GFRESTIMATED 54 (L) 04/18/2018    GFRESTBLACK 66 04/18/2018    ROB 9.0 04/18/2018    BILITOTAL 0.6 09/26/2017    ALBUMIN 4.1 09/26/2017    ALKPHOS 63 09/26/2017    ALT 19 09/26/2017    AST 15 09/26/2017        INR RESULTS:  Lab Results   Component Value Date    INR 2.3 (A) 12/26/2017    INR 2.36 (H) " 2017       No components found for: CK  Lab Results   Component Value Date    MAG 2.4 (H) 2017     Lab Results   Component Value Date    NTBNP 1599 (H) 2017       Most recent echocardiogram:  Recent Results (from the past 4320 hour(s))   Echocardiogram Complete    Narrative    [id number]  ECH19  [id number]  [id number]^SHANTELL^SYDNEE^           [facility]  Diagnostic and [place]-3rd Floor  [address] [place] [address]     Name: SYDNIE SIERRA  MRN: [id number]  : 1965  Study Date: 2018 07:55 AM  Age: 52 yrs  Gender: Male  [place] [facility] [place] For Study: 1 year s/p MitraClip  Ordering Physician: SYDNEE STINSON  Referring Physician: SYDNEE STINSON  Performed By: RAE Agarwal     BSA: 1.6 m2  Height: 62 in  Weight: 126 lb  BP: 130/91 mmHg  _____________________________________________________________________________  __        Procedure  Echocardiogram with two-dimensional, color and spectral Doppler performed.  _____________________________________________________________________________  __        Interpretation Summary  The Ejection Fraction is estimated at 30-35%. Left ventricular size is normal.  Global right ventricular function is mildly reduced. The right ventricle is  normal size.  s/p MitralClip with MV mPG of 4mmHg at 90 bpm. Trace mitral insufficiency is  present.  Moderate tricuspid insufficiency is present.  This study was compared with the study from 12/15/17 .  There has been no change.  _____________________________________________________________________________  __        Left Ventricle  Left ventricular size is normal. Left ventricular wall thickness is normal.  The Ejection Fraction is estimated at 30-35%. Grade III or advanced diastolic  dysfunction. Akinesis of the basal to apical inferior, basal infero-lateral  and infero-septal segments.     Right Ventricle  The right ventricle is  normal size. Global right ventricular function is  mildly reduced. A pacemaker lead is noted in the right ventricle.     Atria  Both atria appear normal. The atrial septum is intact as assessed by color  Doppler . A pacemaker lead is noted in the right atrium.     Mitral Valve  Trace mitral insufficiency is present. s/p MitralClip with MV mPG of 4mmHg at  90 bpm.        Aortic Valve  Aortic valve is normal in structure and function. The aortic valve is  tricuspid.     Tricuspid Valve  Moderate tricuspid insufficiency is present. The right ventricular systolic  pressure is approximated at 41.7 mmHg plus the right atrial pressure. Etiology  of TR is leaflet restriction by ICD lead.     Pulmonic Valve  The pulmonic valve is normal. Trace pulmonic insufficiency is present.     Vessels  The aorta root is normal. The pulmonary artery is normal. The inferior vena  cava was normal in size with preserved respiratory variability.     Pericardium  No pericardial effusion is present.        Compared to Previous Study  This study was compared with the study from 12/15/17 . There has been no  change.     Attestation  I have personally viewed the imaging and agree with the interpretation and  report as documented by the fellow, Kirby Rodriguez, and/or edited by me.  _____________________________________________________________________________  __     MMode/2D Measurements & Calculations  IVSd: 0.84 cm  LVIDd: 5.0 cm  LVIDs: 4.5 cm  LVPWd: 0.89 cm  FS: 9.6 %  LV mass(C)d: 149.1 grams  LV mass(C)dI: 94.9 grams/m2  Ao root diam: 2.6 cm  asc Aorta Diam: 3.0 cm  LVOT diam: 2.0 cm  LVOT area: 3.1 cm2     EF(MOD-bp): 26.7 %  LA Volume (BP): 42.8 ml     LA Volume Index (BP): 27.3 ml/m2  RWT: 0.36        Doppler Measurements & Calculations  MV E max isabel: 129.0 cm/sec  MV A max isabel: 62.4 cm/sec  MV E/A: 2.1  MV max PG: 10.9 mmHg  MV mean PG: 3.6 mmHg  MV V2 VTI: 26.1 cm  MVA(VTI): 1.4 cm2  MV dec time: 0.10 sec  Ao V2 max: 97.9 cm/sec  Ao  max P.0 mmHg  Ao V2 mean: 71.0 cm/sec  Ao mean P.3 mmHg  Ao V2 VTI: 18.3 cm  MICHELLE(I,D): 2.0 cm2  MICHELLE(V,D): 2.2 cm2  LV V1 max P.9 mmHg  LV V1 max: 68.9 cm/sec  LV V1 VTI: 12.0 cm  SV(LVOT): 37.2 ml  SI(LVOT): 23.7 ml/m2  PA V2 max: 65.2 cm/sec  PA max P.7 mmHg  PA acc time: 0.13 sec  PI end-d frank: 160.2 cm/sec  TR max frank: 322.8 cm/sec  TR max P.7 mmHg  AV Frank Ratio (DI): 0.70     MICHELLE Index (cm2/m2): 1.3  E/E' av.1  Lateral E/e': 32.3  Medial E/e': 35.8     _____________________________________________________________________________  __           Report approved by: Isabel Love 2018 09:49 AM      Echocardiogram Limited    Narrative    [id number]  ECH11  [id number]  [id number]^MEGHAN^IVA^AMBER           [facility]  [facility] and [address]  [address] [place] [address]     Name: SYDNIE SIERRA  MRN: [id number]  : 1965  Study Date: 12/15/2017 10:52 AM  Age: 52 yrs  Gender: Male  Patient Location[place] [facility] [place]  Reason For Study: s/p Karena Clip  Ordering Physician: IVA CHRISTIANSON  Referring Physician: IVA CHRISTIANSON  Performed By: Sandra Fields DARRIAN     BSA: 1.6 m2  Height: 62 in  Weight: 123 lb  BP: 100/74 mmHg  _____________________________________________________________________________  __        Procedure  Limited Echocardiogram with portions of two-dimensional, color and spectral  Doppler performed.  _____________________________________________________________________________  __        Interpretation Summary  Moderately reduced systolic function. EF 30-35%. Global hypokinesis with  akinesis of the basal-mid inferior and basal-mid inferolateral walls.  Global right ventricular function is mildly reduced.  Status post transcatheter mitral valve repair with Mitraclip. The clip is  attached to the leaflets. There is trace to mild mitral regurgitation. Mean  gradient 5 mmHg at heart rate 97 bpm.  Pulmonary  hypertension with right ventricular systolic pressure 41mmHg above  the right atrial pressure present.  No pericardial effusion.     Compared to the prior study 8/10/17 there has been no significant change.  _____________________________________________________________________________  __        Left Ventricle  Left ventricular wall thickness is normal. Mild left ventricular dilation is  present. Global hypokinesis with akinesis of the basal-mid inferior and basal-  mid inferolateral walls. Moderately reduced systolic function. EF 30-35%.  Diastolic function not assessed.     Right Ventricle  The right ventricle is normal size. Global right ventricular function is  mildly reduced. A pacemaker lead is noted in the right ventricle.     Atria  Both atria appear normal.     Mitral Valve  Status post transcatheter mitral valve repair with Mitraclip. The clip is  attached to the leaflets. There is trace to mild mitral regurgitation. Mean  gradient 5 mmHg at heart rate 97 bpm.        Aortic Valve  Aortic valve is normal in structure and function.     Tricuspid Valve  Mild tricuspid insufficiency is present. Right ventricular systolic pressure  is 41mmHg above the right atrial pressure.     Pulmonic Valve  The pulmonic valve is normal.     Vessels  The aorta root is normal. The inferior vena cava was normal in size with  preserved respiratory variability. Estimated mean right atrial pressure is 3  mmHg.     Pericardium  No pericardial effusion is present.        Attestation  I have personally viewed the imaging and agree with the interpretation and  report as documented by the fellow, David Hill, and/or edited by me.  _____________________________________________________________________________  __  MMode/2D Measurements & Calculations     IVSd: 0.72 cm  LVIDd: 4.9 cm  LVIDs: 4.2 cm  LVPWd: 0.73 cm  FS: 14.2 %  EDV(Teich): 115.5 ml  ESV(Teich): 80.6 ml  LV mass(C)d: 118.2 grams  LV mass(C)dI: 76.1 grams/m2      EF(MOD-bp): 34.1 %  LA Volume (BP): 49.5 ml  LA Volume Index (BP): 31.9 ml/m2  RWT: 0.30           Doppler Measurements & Calculations  MV E max isabel: 126.4 cm/sec  MV A max isabel: 46.5 cm/sec  MV E/A: 2.7  MV max P.7 mmHg  MV mean P.6 mmHg  MV V2 VTI: 26.5 cm  MV dec time: 0.13 sec  TR max isabel: 321.2 cm/sec  TR max P.3 mmHg     _____________________________________________________________________________  __           Report approved by: Isabel Alex 12/15/2017 12:24 PM          Assessment and Plan:    In summary, Luke is a 52 year old gentleman with ICM who appears to be hypovolemic today.  His BUN/creatinine suggest he is dry as well.  I have asked him to increase his water intake and reviewed the 2L fluid restriction with him.  We discussed taking the bumex as needed, but at this time he would prefer to increase his water intake and continue his current diuretic dose as he will be going to [place] to spend time with his children.      Today I have increased his Toprol XL to 100 mg daily.  I will reattempt to increase lisinopril at his next visit in a month depending on renal function, which I suspect with the above measures, will improve.        1.  Chronic a heart failure secondary to ICM.  Stage C NYHA Class II  ACEi/ARB:  Lisinopril 2.5 mg twice daily.  BB: Increase Metoprolol  mg daily.   Aldosterone antagonist: contraindicated due to transient renal dysfunction with prior attempts  SCD prophylaxis: ICD  Fluid status: euvolemic  Anticoagulation: None.     Antiplatelet:  ASA dose 81 mg daily.  NSAID use:  Contraindicated.  Avoid use.     2.  CAD:  Stable.  Continue Plavix, ASA, Metoprolol, Lisinopril, and Atorvastatin     3.  GERD:  He ran out of his protonix and is planning to go out of town.  I gave him a one month refill with instructions that his primary MD needs to fill going forward.  Note left with the pharmacy with this information.       5.  Follow-up:  CORE clinic 1  month.  Follow up with Dr. Amaral in 6 months and repeat SAMEERA in one year.     Vandana VENEGAS, [facility]  CORE Clinic   [phone]

## 2018-04-19 ENCOUNTER — TELEPHONE (OUTPATIENT)
Dept: CARDIOLOGY | Facility: CLINIC | Age: 53
End: 2018-04-19

## 2018-04-23 DIAGNOSIS — I34.0 MITRAL VALVE INSUFFICIENCY, UNSPECIFIED ETIOLOGY: Primary | ICD-10-CM

## 2018-04-23 NOTE — PROGRESS NOTES
Date: 4/23/2018    Time of Call: 11:51 AM     Diagnosis:  Mitral valve insufficiency     [ TORB ] Ordering provider: Grant Dejesus MD  Order:   6 minute walk     Order received by: Luz Philip RN     Follow-up/additional notes:   S/p 1 year mitraclip

## 2018-05-10 ENCOUNTER — PRE VISIT (OUTPATIENT)
Dept: CARDIOLOGY | Facility: CLINIC | Age: 53
End: 2018-05-10

## 2018-05-10 DIAGNOSIS — I50.22 CHRONIC SYSTOLIC HEART FAILURE (H): Primary | ICD-10-CM

## 2018-05-16 ENCOUNTER — OFFICE VISIT (OUTPATIENT)
Dept: CARDIOLOGY | Facility: CLINIC | Age: 53
End: 2018-05-16
Attending: NURSE PRACTITIONER
Payer: COMMERCIAL

## 2018-05-16 VITALS
DIASTOLIC BLOOD PRESSURE: 86 MMHG | HEIGHT: 62 IN | WEIGHT: 127.8 LBS | BODY MASS INDEX: 23.52 KG/M2 | SYSTOLIC BLOOD PRESSURE: 128 MMHG | OXYGEN SATURATION: 100 % | HEART RATE: 80 BPM

## 2018-05-16 DIAGNOSIS — Z95.818 S/P MITRAL VALVE CLIP IMPLANTATION: ICD-10-CM

## 2018-05-16 DIAGNOSIS — Z98.890 HISTORY OF MITRAL VALVE REPAIR: Primary | ICD-10-CM

## 2018-05-16 DIAGNOSIS — I25.10 CORONARY ARTERY DISEASE INVOLVING NATIVE CORONARY ARTERY OF NATIVE HEART WITHOUT ANGINA PECTORIS: ICD-10-CM

## 2018-05-16 DIAGNOSIS — I50.22 CHRONIC SYSTOLIC HEART FAILURE (H): ICD-10-CM

## 2018-05-16 DIAGNOSIS — I50.22 CHRONIC SYSTOLIC CONGESTIVE HEART FAILURE (H): Primary | ICD-10-CM

## 2018-05-16 DIAGNOSIS — Z98.890 S/P MITRAL VALVE CLIP IMPLANTATION: ICD-10-CM

## 2018-05-16 LAB
6 MIN WALK (FT): 1070 FT
6 MIN WALK (M): 326 M
ANION GAP SERPL CALCULATED.3IONS-SCNC: 8 MMOL/L (ref 3–14)
BUN SERPL-MCNC: 32 MG/DL (ref 7–30)
CALCIUM SERPL-MCNC: 9 MG/DL (ref 8.5–10.1)
CHLORIDE SERPL-SCNC: 106 MMOL/L (ref 94–109)
CO2 SERPL-SCNC: 26 MMOL/L (ref 20–32)
CREAT SERPL-MCNC: 1.24 MG/DL (ref 0.66–1.25)
GFR SERPL CREATININE-BSD FRML MDRD: 62 ML/MIN/1.7M2
GLUCOSE SERPL-MCNC: 138 MG/DL (ref 70–99)
POTASSIUM SERPL-SCNC: 4.6 MMOL/L (ref 3.4–5.3)
SODIUM SERPL-SCNC: 140 MMOL/L (ref 133–144)

## 2018-05-16 PROCEDURE — 80048 BASIC METABOLIC PNL TOTAL CA: CPT | Performed by: NURSE PRACTITIONER

## 2018-05-16 PROCEDURE — 99214 OFFICE O/P EST MOD 30 MIN: CPT | Mod: ZP | Performed by: NURSE PRACTITIONER

## 2018-05-16 PROCEDURE — G0463 HOSPITAL OUTPT CLINIC VISIT: HCPCS | Mod: 25,ZF

## 2018-05-16 PROCEDURE — 36415 COLL VENOUS BLD VENIPUNCTURE: CPT | Performed by: NURSE PRACTITIONER

## 2018-05-16 RX ORDER — METOPROLOL SUCCINATE 50 MG/1
150 TABLET, EXTENDED RELEASE ORAL DAILY
Qty: 180 TABLET | Refills: 3 | Status: SHIPPED | OUTPATIENT
Start: 2018-05-16 | End: 2018-12-19

## 2018-05-16 RX ORDER — POTASSIUM CHLORIDE 750 MG/1
TABLET, EXTENDED RELEASE ORAL
Qty: 30 TABLET | Refills: 3 | Status: SHIPPED | OUTPATIENT
Start: 2018-05-16 | End: 2018-10-15

## 2018-05-16 ASSESSMENT — PAIN SCALES - GENERAL: PAINLEVEL: NO PAIN (0)

## 2018-05-16 NOTE — NURSING NOTE
Chief Complaint   Patient presents with     Follow Up For     Return CORE; 52yr old male with a h/o chronic systolic HF secondary to ICM with reduced EF 32% presenting for follow-up with labs prior     Vitals were taken and medications were reconciled.     Brit GUERRAA  12:08 PM

## 2018-05-16 NOTE — PATIENT INSTRUCTIONS
"You were seen today in the Cardiovascular Clinic at the [facility].     Cardiology Providers you saw during your visit: Vandana VENEGAS CNP      Follow up and medication changes:    1. Continue taking your Bumex every other day.   2. Decrease Potassium to 10 mEq every other day  3. Increase Toprol XL to 150 mg daily  4. Try to drink 4 glasses of fluid a day  5. Follow up in CORE Clinic in 1 month.     Results for SYDNIE SIERRA (MRN [id number]) as of 2018 12:12   Ref. Range 2018 11:01   Sodium Latest Ref Range: 133 - 144 mmol/L 140   Potassium Latest Ref Range: 3.4 - 5.3 mmol/L 4.6   Chloride Latest Ref Range: 94 - 109 mmol/L 106   Carbon Dioxide Latest Ref Range: 20 - 32 mmol/L 26   Urea Nitrogen Latest Ref Range: 7 - 30 mg/dL 32 (H)   Creatinine Latest Ref Range: 0.66 - 1.25 mg/dL 1.24   GFR Estimate Latest Ref Range: >60 mL/min/1.7m2 62   GFR Estimate If Black Latest Ref Range: >60 mL/min/1.7m2 74   Calcium Latest Ref Range: 8.5 - 10.1 mg/dL 9.0   Anion Gap Latest Ref Range: 3 - 14 mmol/L 8   Glucose Latest Ref Range: 70 - 99 mg/dL 138 (H)           Please limit your fluid intake to 2 L (68 ounces) daily.  2 Liters a day = 8.5 cups, or 68 ounces.  Please limit your salt intake to 2 grams a day or less.     If you gain 2# in 24 hours or 5# in one week call Soraya Messina RN so we can adjust your medications as needed over the phone.     Please feel free to call me with any questions or concerns.       Soraya Messina RN  [facility]  Cardiology Care Coordinator-Heart Failure Clinic     Questions and [phone].   First press #1 for the [employer] and then press #3 for \"Medical Questions\" to reach us Cardiology Nurses.      On Call Cardiologist for after hours or on weekends: [phone]   option #4 and ask to speak to the on-call Cardiologist. Inform them you are a CORE/heart failure patient at the [place].           If you need a medication " refill please contact your pharmacy.  Please allow 3 business days for your refill to be completed.  _______________________________________________________  [facility]IN[facility] Cardiomyopathy, Optimization, Rehabilitation, Education   The C.[facility].R.E[facility] CLINIC is a heart failure specialty clinic within the [facility] where you will work with specialized nurse practitioners dedicated to helping patients with heart failure carefully adjust medications, receive education, and learn who and when to call if symptoms develop. They specialize in helping you better understand your condition, slow the progression of your disease, improve the length and quality of your life, help you detect future heart problems before they become life threatening, and avoid hospitalizations.  As always, thank you for trusting us with your health care needs!

## 2018-05-16 NOTE — LETTER
"5/16/2018      RE: Luke Henao  [address]  [facility] [address]       Dear Colleague,    Thank you for the opportunity to participate in the care of your patient, Luke Hneao, at the [facility] at [facility]. Please see a copy of my visit note below.      HPI: Luke is a 52 year old gentleman with a past medical history of ICM, CAD with a RCA STEMI s/p PCI x 7 to RCA, left circumflex (now ), and LAD on 3/27/17 complicated by PEDRO, sepsis, sacral ulcer, DVT, bilateral hemispheric infarcts secondary to watershed ischemia, and cardiogenic shock s/p IABP, who was later transitioned to a subclavian balloon pump and s/p mitral clip for ischemic MR.  He returns for routine follow up.      Luke is doing well.  \"Feeling very very good\" per his report.  Traveled to [place] to see his children and grandchildren.  He was able to walk through the airport without using a wheelchair but admits he had to push himself because he became fatigued and his legs were tired.  He continues to deny shortness of breath with normal activity but admits that he will get SOB and fatigued after climbing an incline or up a flight of stairs.  Denies SOB at rest, no PND, orthopnea, edema, weight gain, chest pain, palpitations, lightheadedness, dizziness, near syncopal/syncopal episodes.      He has been increasing his fluid intake and has been drinking three bottles of water daily approximately 48 ounces.   He has been compliant with following his sodium restrictions.  He is tolerating his medications well, without any side effects.       PAST MEDICAL HISTORY:  Past Medical History:   Diagnosis Date     Acne      CAD (coronary artery disease) 2017    RCA STEMI s/p balloon angioplasty and multiple Sofie to RCA, LCx, and LAD     Cardiogenic shock (H)      DVT (deep venous thrombosis) (H) 2017    left internal jugular (line-associated); left common femoral; on coumadin     Ischemic " cardiomyopathy 2017    EF 30-35%     Ischemic stroke (H) 2017    no residual deficits     Lower GI bleed 2017    due to rectal ulcer     Mitral valve insufficiency, ischemic 2017    s/p Mitral Clip placement      Skin ulcer of sacrum (H)      Systolic heart failure (H)      Type 2 diabetes mellitus (H)      FAMILY HISTORY:  Family History   Problem Relation Age of Onset     Hypertension Mother      Type 2 Diabetes Father      Hypertension Father      Myocardial Infarction No family hx of      CEREBROVASCULAR DISEASE No family hx of      Coronary Artery Disease Early Onset No family hx of      Colon Cancer No family hx of      Prostate Cancer No family hx of      SOCIAL HISTORY:  Social History     Social History     Marital status:      Spouse name: N/A     Number of children: N/A     Years of education: N/A     Occupational History     [employer]      Social History Main Topics     Smoking status: Former Smoker     Packs/day: 0.50     Years: 30.00     Types: Cigarettes     Quit date: 1/1/2017     Smokeless tobacco: Never Used     Alcohol use No     Drug use: No     Sexual activity: Not Currently     Other Topics Concern     None     Social History Narrative    . Remarried.    4 children with first marriage.    2 grand children.    Walks daily, but no formal exercise.          CURRENT MEDICATIONS:  Current Outpatient Prescriptions   Medication Sig Dispense Refill     atorvastatin (LIPITOR) 40 MG tablet 1 tablet (40 mg) by Oral or Feeding Tube route daily 60 tablet 7     bumetanide (BUMEX) 0.5 MG tablet Take 1 tablet (0.5 mg) by mouth every other day 45 tablet 3     cetirizine (ZYRTEC) 10 MG tablet Take 10 mg by mouth daily       clopidogrel (PLAVIX) 75 MG tablet Take 1 tablet (75 mg) by mouth daily 60 tablet 11     digoxin (LANOXIN) 125 MCG tablet Take one tablet on M, W, F, Sunday. 60 tablet 5     finasteride (PROSCAR) 5 MG tablet Take 1 tablet (5 mg) by mouth daily 60 tablet 11      glipiZIDE (GLUCOTROL) 5 MG tablet Take 1 tablet (5 mg) by mouth 2 times daily (before meals) 180 tablet 3     lisinopril (PRINIVIL/ZESTRIL) 2.5 MG tablet Take 1 tablet (2.5 mg) by mouth 2 times daily 90 tablet 3     metFORMIN (GLUCOPHAGE-XR) 500 MG 24 hr tablet Take 1 tablet (500 mg) by mouth daily (with dinner) 180 tablet 3     metoprolol succinate (TOPROL-XL) 50 MG 24 hr tablet Take 2 tablets (100 mg) by mouth daily 180 tablet 3     pantoprazole (PROTONIX) 40 MG EC tablet Take 1 tablet (40 mg) by mouth daily 30 tablet 0     potassium chloride SA (K-DUR/KLOR-CON M) 20 MEQ CR tablet Take 1 tablet (20 mEq) by mouth daily 90 tablet 3     alcohol swab prep pads Use to swab area of injection/mary alice as directed 3 x per day (Patient not taking: Reported on 3/12/2018) 100 each 11     aspirin 81 MG chewable tablet Take 1 tablet (81 mg) by mouth daily (Patient not taking: Reported on 3/12/2018) 36 tablet 3     ASPIRIN NOT PRESCRIBED (INTENTIONAL) Please choose reason not prescribed, below (Patient not taking: Reported on 3/12/2018) 0 each 0     Benzoyl Peroxide 2.5 % CREA Externally apply topically daily (Patient not taking: Reported on 3/12/2018) 21 g 11     insulin pen needle (BD KYLEE U/F) 32G X 4 MM Use 3 daily or as directed. (Patient not taking: Reported on 3/12/2018) 100 each prn     ONETOUCH ULTRA test strip USE TO TEST BLOOD SUGAR 3 TIMES DAILY OR AS DIRECTED. (Patient not taking: Reported on 3/23/2018) 100 strip 3     Sharps Container MISC 1 each every 30 days (Patient not taking: Reported on 3/12/2018) 1 each 0     tamsulosin (FLOMAX) 0.4 MG capsule Take 1 capsule (0.4 mg) by mouth daily (Patient not taking: Reported on 3/12/2018) 60 capsule 0     tretinoin (RETIN-A) 0.05 % cream Spread a pea size amount into affected area topically at bedtime.  Use sunscreen SPF>20. (Patient not taking: Reported on 3/12/2018) 45 g 11     Warfarin Therapy Reminder 1 each continuous prn (Patient not taking: Reported on 3/12/2018)    "    zinc sulfate (ZINCATE) 220 (50 ZN) MG capsule Take 1 capsule (220 mg) by mouth daily (Patient not taking: Reported on 4/18/2018) 2 capsule 0   ROS:  Constitutional: Denies fever, chills, or diaphoresis. Weight stable.   ENT: Denies visual disturbance, hearing loss, epistaxis, vertigo   Respiratory:  See HPI  Cardiovascular: As per HPI.   GI: Denies nausea, vomiting, diarrhea, hematemesis, melena, or hematochezia.   : Denies urinary frequency, dysuria, or hematuria.   Integument: +Acne.  Negative for bruising, rash, or pruritis.  Psychiatric: Negative for anxiety, depression, sleep disturbance, or mood changes.   Neuro: Negative for headaches, syncope, numbness or tingling.   Endocrinology: +DM  Musculoskeletal: Negative for gout, joint stiffness, swelling, or muscle weakness    EXAM:  /86 (BP Location: Left arm, Patient Position: Chair, Cuff Size: Adult Regular)  Pulse 80  Ht 1.575 m (5' 2\")  Wt 58 kg (127 lb 12.8 oz)  SpO2 100%  BMI 23.37 kg/m2  General: alert, articulate, and in no acute distress.  HEENT: normocephalic, atraumatic, anicteric sclera, EOMI, normal palate, mucosa moist, dentition intact, no cyanosis.    Neck: neck supple.  No adenopathy, masses, or carotid bruits.  JVP not appreciated.   Heart: regular rhythm, normal S1/S2, no murmur, gallop, rub.  Precordium quiet with normal PMI.     Lungs: clear, no rales, ronchi, or wheezing.  No accessory muscle use, respirations unlabored.   Abdomen: soft, non-tender, bowel sounds present, no hepatomegaly  Extremities: No cyanosis or edema.  2+ pedal pulses.   Neurological: alert and oriented x 3.  normal speech and affect, no gross motor deficits  Skin:  No ecchymoses, rashes, or clubbing.    Labs:  CBC RESULTS:  Lab Results   Component Value Date    WBC 11.2 (H) 10/27/2017    RBC 4.48 10/27/2017    HGB 13.5 10/27/2017    HCT 42.2 10/27/2017    MCV 94 10/27/2017    MCH 30.1 10/27/2017    MCHC 32.0 10/27/2017    RDW 13.0 10/27/2017     " 10/27/2017     CMP RESULTS:  Lab Results   Component Value Date     2018    POTASSIUM 4.6 2018    CHLORIDE 106 2018    CO2 26 2018    ANIONGAP 8 2018     (H) 2018    BUN 32 (H) 2018    CR 1.24 2018    GFRESTIMATED 62 2018    GFRESTBLACK 74 2018    ROB 9.0 2018    BILITOTAL 0.6 2017    ALBUMIN 4.1 2017    ALKPHOS 63 2017    ALT 19 2017    AST 15 2017      INR RESULTS:  Lab Results   Component Value Date    INR 2.3 (A) 2017    INR 2.36 (H) 2017     No components found for: CK  Lab Results   Component Value Date    MAG 2.4 (H) 2017     Lab Results   Component Value Date    NTBNP 1599 (H) 2017     Most recent echocardiogram:  Recent Results (from the past 8760 hour(s))   Echocardiogram Complete    Narrative    [id number]  [id number]  [id number]  [id number]^SHANTELL^SYDNEE^           [facility]  Diagnostic and [facility]-3rd Floor  [address] [place] [address]     Name: SYDNIE SIERRA  MRN: [id number]  : 1965  Study Date: 2018 07:55 AM  Age: 52 yrs  Gender: Male  Patient Location: [facility]  Reason For Study: 1 year s/p MitraClip  Ordering Physician: SYDNEE STINSON  Referring Physician: SYDNEE STINSON  Performed By: RAE Agarwal     BSA: 1.6 m2  Height: 62 in  Weight: 126 lb  BP: 130/91 mmHg  _____________________________________________________________________________  __        Procedure  Echocardiogram with two-dimensional, color and spectral Doppler performed.  _____________________________________________________________________________  __        Interpretation Summary  The Ejection Fraction is estimated at 30-35%. Left ventricular size is normal.  Global right ventricular function is mildly reduced. The right ventricle is  normal size.  s/p MitralClip with MV mPG of 4mmHg at 90 bpm. Trace mitral insufficiency  is  present.  Moderate tricuspid insufficiency is present.  This study was compared with the study from 12/15/17 .  There has been no change.  _____________________________________________________________________________  __        Left Ventricle  Left ventricular size is normal. Left ventricular wall thickness is normal.  The Ejection Fraction is estimated at 30-35%. Grade III or advanced diastolic  dysfunction. Akinesis of the basal to apical inferior, basal infero-lateral  and infero-septal segments.     Right Ventricle  The right ventricle is normal size. Global right ventricular function is  mildly reduced. A pacemaker lead is noted in the right ventricle.     Atria  Both atria appear normal. The atrial septum is intact as assessed by color  Doppler . A pacemaker lead is noted in the right atrium.     Mitral Valve  Trace mitral insufficiency is present. s/p MitralClip with MV mPG of 4mmHg at  90 bpm.        Aortic Valve  Aortic valve is normal in structure and function. The aortic valve is  tricuspid.     Tricuspid Valve  Moderate tricuspid insufficiency is present. The right ventricular systolic  pressure is approximated at 41.7 mmHg plus the right atrial pressure. Etiology  of TR is leaflet restriction by ICD lead.     Pulmonic Valve  The pulmonic valve is normal. Trace pulmonic insufficiency is present.     Vessels  The aorta root is normal. The pulmonary artery is normal. The inferior vena  cava was normal in size with preserved respiratory variability.     Pericardium  No pericardial effusion is present.        Compared to Previous Study  This study was compared with the study from 12/15/17 . There has been no  change.     Attestation  I have personally viewed the imaging and agree with the interpretation and  report as documented by the fellow, Kirby Rodriguez, and/or edited by me.  _____________________________________________________________________________  __     MMode/2D Measurements &  Calculations  IVSd: 0.84 cm  LVIDd: 5.0 cm  LVIDs: 4.5 cm  LVPWd: 0.89 cm  FS: 9.6 %  LV mass(C)d: 149.1 grams  LV mass(C)dI: 94.9 grams/m2  Ao root diam: 2.6 cm  asc Aorta Diam: 3.0 cm  LVOT diam: 2.0 cm  LVOT area: 3.1 cm2     EF(MOD-bp): 26.7 %  LA Volume (BP): 42.8 ml     LA Volume Index (BP): 27.3 ml/m2  RWT: 0.36        Doppler Measurements & Calculations  MV E max frank: 129.0 cm/sec  MV A max frank: 62.4 cm/sec  MV E/A: 2.1  MV max PG: 10.9 mmHg  MV mean PG: 3.6 mmHg  MV V2 VTI: 26.1 cm  MVA(VTI): 1.4 cm2  MV dec time: 0.10 sec  Ao V2 max: 97.9 cm/sec  Ao max P.0 mmHg  Ao V2 mean: 71.0 cm/sec  Ao mean P.3 mmHg  Ao V2 VTI: 18.3 cm  MICHELLE(I,D): 2.0 cm2  MICHELLE(V,D): 2.2 cm2  LV V1 max P.9 mmHg  LV V1 max: 68.9 cm/sec  LV V1 VTI: 12.0 cm  SV(LVOT): 37.2 ml  SI(LVOT): 23.7 ml/m2  PA V2 max: 65.2 cm/sec  PA max P.7 mmHg  PA acc time: 0.13 sec  PI end-d frank: 160.2 cm/sec  TR max frank: 322.8 cm/sec  TR max P.7 mmHg  AV Frank Ratio (DI): 0.70     MICHELLE Index (cm2/m2): 1.3  E/E' av.1  Lateral E/e': 32.3  Medial E/e': 35.8     _____________________________________________________________________________  __           Report approved by: Isabel Love 2018 09:49 AM      Echocardiogram Limited    Narrative    [id number]  [facility]  [id number]  [id number]^MEGHAN^IVA^AMBER           [facility]  Diagnostic and [facility]-3rd Floor  [address] [place] [address]     Name: SYDNIE SIERRA  MRN: [id number]  : 1965  Study Date: 12/15/2017 10:52 AM  Age: 52 yrs  Gender: Male  Patient Location: [facility]  Reason For Study: s/p Karena Clip  Ordering Physician: IVA CHRISTIANSON  Referring Physician: IVA CHRISTIANSON  Performed By: Sandra Fields RDCS     BSA: 1.6 m2  Height: 62 in  Weight: 123 lb  BP: 100/74 mmHg  _____________________________________________________________________________  __        Procedure  Limited Echocardiogram with portions of  two-dimensional, color and spectral  Doppler performed.  _____________________________________________________________________________  __        Interpretation Summary  Moderately reduced systolic function. EF 30-35%. Global hypokinesis with  akinesis of the basal-mid inferior and basal-mid inferolateral walls.  Global right ventricular function is mildly reduced.  Status post transcatheter mitral valve repair with Mitraclip. The clip is  attached to the leaflets. There is trace to mild mitral regurgitation. Mean  gradient 5 mmHg at heart rate 97 bpm.  Pulmonary hypertension with right ventricular systolic pressure 41mmHg above  the right atrial pressure present.  No pericardial effusion.     Compared to the prior study 8/10/17 there has been no significant change.  _____________________________________________________________________________  __        Left Ventricle  Left ventricular wall thickness is normal. Mild left ventricular dilation is  present. Global hypokinesis with akinesis of the basal-mid inferior and basal-  mid inferolateral walls. Moderately reduced systolic function. EF 30-35%.  Diastolic function not assessed.     Right Ventricle  The right ventricle is normal size. Global right ventricular function is  mildly reduced. A pacemaker lead is noted in the right ventricle.     Atria  Both atria appear normal.     Mitral Valve  Status post transcatheter mitral valve repair with Mitraclip. The clip is  attached to the leaflets. There is trace to mild mitral regurgitation. Mean  gradient 5 mmHg at heart rate 97 bpm.        Aortic Valve  Aortic valve is normal in structure and function.     Tricuspid Valve  Mild tricuspid insufficiency is present. Right ventricular systolic pressure  is 41mmHg above the right atrial pressure.     Pulmonic Valve  The pulmonic valve is normal.     Vessels  The aorta root is normal. The inferior vena cava was normal in size with  preserved respiratory variability. Estimated  mean right atrial pressure is 3  mmHg.     Pericardium  No pericardial effusion is present.        Attestation  I have personally viewed the imaging and agree with the interpretation and  report as documented by the fellow, Dvaid Hill, and/or edited by me.  _____________________________________________________________________________  __  MMode/2D Measurements & Calculations     IVSd: 0.72 cm  LVIDd: 4.9 cm  LVIDs: 4.2 cm  LVPWd: 0.73 cm  FS: 14.2 %  EDV(Teich): 115.5 ml  ESV(Teich): 80.6 ml  LV mass(C)d: 118.2 grams  LV mass(C)dI: 76.1 grams/m2     EF(MOD-bp): 34.1 %  LA Volume (BP): 49.5 ml  LA Volume Index (BP): 31.9 ml/m2  RWT: 0.30           Doppler Measurements & Calculations  MV E max isabel: 126.4 cm/sec  MV A max isabel: 46.5 cm/sec  MV E/A: 2.7  MV max P.7 mmHg  MV mean P.6 mmHg  MV V2 VTI: 26.5 cm  MV dec time: 0.13 sec  TR max isabel: 321.2 cm/sec  TR max P.3 mmHg     _____________________________________________________________________________  __           Report approved by: Isabel Alex 12/15/2017 12:24 PM      ECHO COMPLETE WITH OPTISON    Narrative    [id number]  [id number]  [id number]  [id number]^MEGHAN^IVA^AMBER           [facility]  Diagnostic and [facility]-3rd Floor  [address] [place] [address]     Name: SYDNIE SIERRA  MRN: [id number]  : 1965  Study Date: 08/10/2017 09:02 AM  Age: 52 yrs  Gender: Male  Patient Location: [facility]  Reason For Study: Nonrheumatic mitral (valve) insufficiency  Ordering Physician: IVA CHRISTIANSON  Referring Physician: IVA CHRISTIANSON  Performed By: True Newman RDCS     BSA: 1.5 m2  Height: 62 in  Weight: 118 lb  _____________________________________________________________________________  __        Procedure  Echocardiogram with two-dimensional, color and spectral Doppler performed.  _____________________________________________________________________________  __        Interpretation  Summary  Moderately (EF 30-35%) reduced left ventricular function is present (bi-plane  32%). Basal lateral, inferior, and inferoseptal hypokinesis.  Global RV function mildly reduced.  S/p trans-catheter MVR 6/2017. Mild mitral insufficiency is present. Mean  valve gradient 5 mmHg at a heart rate of 104 bpm.  Right ventricular systolic pressure is 40mmHg above the right atrial pressure.  The inferior vena cava is normal in size with preserved respiratory  variability.  No pericardial effusion is present.  _____________________________________________________________________________  __        Left Ventricle  Left ventricular wall thickness is normal. Left ventricular size is normal.  Diastolic function cannot be assessedBiplane traced at 32%. Moderately (EF 35-  40%) reduced left ventricular function is present. Basal lateral and inferior,  basal and mid inferoseptal, basal inferior hypokinesis.     Right Ventricle  The right ventricle is normal size. Global right ventricular function is  mildly reduced.     Atria  The right atria appears normal. Mild left atrial enlargement is present. The  atrial septum is intact as assessed by color Doppler .        Mitral Valve  S/p mitral valve repair. Mean gradient across the valve is 5 mmHg. Mild mitral  annular calcification is present. Mild mitral insufficiency is present. The  mean mitral valve gradient is 4.6 mmHg.     Aortic Valve  The aortic valve is tricuspid. Trace aortic insufficiency is present.     Tricuspid Valve  Mild tricuspid insufficiency is present. Right ventricular systolic pressure  is 40mmHg above the right atrial pressure.     Pulmonic Valve  The pulmonic valve is normal. Trace pulmonic insufficiency is present.     Vessels  The aorta root is normal. The inferior vena cava was normal in size with  preserved respiratory variability. Estimated mean right atrial pressure is 3  mmHg.     Pericardium  No pericardial effusion is present.        Compared to  Previous Study  This study was compared with the study from 2017 . The RVSP is marginally  higher.     Attestation  I have personally viewed the imaging and agree with the interpretation and  report as documented by the fellow, Dmitriy Merino, and/or edited by me.  _____________________________________________________________________________  __     MMode/2D Measurements & Calculations  IVSd: 0.73 cm  LVIDd: 5.3 cm  LVIDs: 4.5 cm  LVPWd: 0.46 cm  FS: 14.0 %  EDV(Teich): 134.6 ml  ESV(Teich): 94.8 ml  LV mass(C)d: 103.8 grams  LV mass(C)dI: 67.9 grams/m2  Ao root diam: 2.6 cm  asc Aorta Diam: 3.1 cm  LVOT diam: 1.8 cm  LVOT area: 2.6 cm2        Doppler Measurements & Calculations  MV max P.3 mmHg  MV mean P.6 mmHg  MV V2 VTI: 23.7 cm  TR max isabel: 317.6 cm/sec  TR max P.4 mmHg        _____________________________________________________________________________  __        Report approved by: Isabel Daniel 08/10/2017 03:02 PM      Echocardiogram Limited    Narrative    [id number]  [id number]  [id number]  [id number]^MEGHAN^IVA^AMBER           [facility]  Diagnostic and [facility]-3rd Floor  [address] [place] [address]     Name: SYDNIE SIERRA  MRN: [id number]  : 1965  Study Date: 2017 08:34 AM  Age: 51 yrs  Gender: Male  Patient Location: [facility]  Reason For Study: S/P MV clip  History: S/P MV clip  Ordering Physician: IVA CHRISTIANSON  Referring Physician: IVA CHRISTIANSON  Performed By: Lila Miner RDCS     BSA: 1.5 m2  Height: 61 in  Weight: 120 lb  BP: 100/60 mmHg  _____________________________________________________________________________  __        Procedure  Limited Echocardiogram with portions of two-dimensional, color and spectral  Doppler performed.  _____________________________________________________________________________  __        Interpretation Summary  Moderately (EF 30-35%) reduced left ventricular function is  present. Inferior  wall akinesis. Basal and mid inferoseptal and inferolateral hypokinesis.  Global right ventricular function is mildly reduced.  S/p trans-catheter [id number] mitral valve repair (TMVR). Mean gradient of  3.4 mmHg at a HR of 96 bpm. Trace to mild mitral insufficiency is present.  The inferior vena cava was normal in size with preserved respiratory  variability.  No pericardial effusion is present.  Compared to study from 5/2/17 there is no significant change.  _____________________________________________________________________________  __        Left Ventricle  Left ventricular wall thickness is normal. Left ventricular size is normal.  Moderately (EF 30-35%) reduced left ventricular function is present. Left  ventricular diastolic function is indeterminate. Inferior wall akinesis. Basal  and mid inferoseptal and inferolateral hypokinesis.     Right Ventricle  The right ventricle is normal size. Global right ventricular function is  mildly reduced.     Atria  Both atria appear normal. The atrial septum is intact as assessed by color  Doppler .     Mitral Valve  Trace to mild mitral insufficiency is present. S/p trans-catheter [id number]  mitral valve repair (TMVR). Mean gradient of 3.4 mmHg at a HR of 96 bpm.        Aortic Valve  Aortic valve is normal in structure and function. The aortic valve is  tricuspid.     Tricuspid Valve  The tricuspid valve is normal. Trace tricuspid insufficiency is present. Right  ventricular systolic pressure is 29mmHg above the right atrial pressure.  Pulmonary artery systolic pressure is normal.     Pulmonic Valve  The pulmonic valve cannot be assessed.     Vessels  The inferior vena cava was normal in size with preserved respiratory  variability. The aorta root cannot be assessed. The pulmonary artery cannot be  assessed. Normal pulmonary arteries.     Pericardium  No pericardial effusion is present.        Compared to Previous Study  Compared to study from  17 there is no significant change.     Attestation  I have personally viewed the imaging and agree with the interpretation and  report as documented by the fellow, Dr. Josue Ibarra, and/or edited by me.  _____________________________________________________________________________  __     MMode/2D Measurements & Calculations  LA Volume (BP): 32.7 ml  LA Volume Index (BP): 21.5 ml/m2        Doppler Measurements & Calculations  MV E max isabel: 136.7 cm/sec  MV A max isabel: 95.2 cm/sec  MV E/A: 1.4  MV max P.1 mmHg  MV mean PG: 3.4 mmHg  MV V2 VTI: 30.7 cm  MV P1/2t max isabel: 144.2 cm/sec  MV P1/2t: 50.5 msec  MVA(P1/2t): 4.4 cm2  MV dec slope: 836.5 cm/sec2  MV dec time: 0.19 sec     Ao V2 max: 131.5 cm/sec  Ao max P.0 mmHg  Ao V2 mean: 91.3 cm/sec  Ao mean PG: 3.8 mmHg  Ao V2 VTI: 19.3 cm  LV V1 max PG: 3.2 mmHg  LV V1 max: 89.1 cm/sec  LV V1 VTI: 13.4 cm  TR max isabel: 267.1 cm/sec  TR max P.5 mmHg  Lateral E/e': 27.7  Medial E/e': 35.3     _____________________________________________________________________________  __           Report approved by: Isabel Love 2017 10:17 AM        Assessment and Plan:    In summary, Luke is a 52 year old gentleman with ICM who appears to euvolemic today.  I have asked him to decrease his potassium to 10 meq every other day when he takes his Bumex as his potassium level was 4.6 today.  I have also increased his Metoprolol to 150 mg daily.  [facility] will [facility] to [facility] in one month.  If his creatinine remains stable, will re-attempt increasing his lisinopril.  I also renewed his handicapped parking sticker for one more year as he does get symptomatic with walking prolonged distances or any incline.      1.  Chronic a heart failure secondary to ICM.  Stage C NYHA Class IIIA  ACEi/ARB:  Lisinopril 2.5 mg twice daily.  BB: Increase Metoprolol  mg daily.   Aldosterone antagonist: contraindicated due to transient renal dysfunction with prior attempts  SCD  prophylaxis: ICD  Fluid status: euvolemic  Anticoagulation: None.     Antiplatelet:  ASA dose 81 mg daily.  NSAID use:  Contraindicated.  Avoid use.      2.  CAD:  Stable.  Continue Plavix, ASA, Metoprolol, Lisinopril, and Atorvastatin      3.  Mitral valve repair s/p mitral clip:  Repeat SAMEERA in one year.    4.  Follow up:  [facility] 1 month.  Follow up with Dr. Amaral in August and repeat SAMEERA in one year.        Vandana VENEGAS, CNP  [facility]   [phone]

## 2018-05-16 NOTE — NURSING NOTE

## 2018-05-16 NOTE — PROGRESS NOTES
"  HPI: Luke is a 52 year old gentleman with a past medical history of ICM, CAD with a RCA STEMI s/p PCI x 7 to RCA, left circumflex (now ), and LAD on 3/27/17 complicated by PEDRO, sepsis, sacral ulcer, DVT, bilateral hemispheric infarcts secondary to watershed ischemia, and cardiogenic shock s/p IABP, who was later transitioned to a subclavian balloon pump and s/p mitral clip for ischemic MR.  He returns for routine follow up.      Luke is doing well.  \"Feeling very very good\" per his report.  Traveled to [place] to see his children and grandchildren.  He was able to walk through the airport without using a wheelchair but admits he had to push himself because he became fatigued and his legs were tired.  He continues to deny shortness of breath with normal activity but admits that he will get SOB and fatigued after climbing an incline or up a flight of stairs.  Denies SOB at rest, no PND, orthopnea, edema, weight gain, chest pain, palpitations, lightheadedness, dizziness, near syncopal/syncopal episodes.      He has been increasing his fluid intake and has been drinking three bottles of water daily approximately 48 ounces.   He has been compliant with following his sodium restrictions.  He is tolerating his medications well, without any side effects.       PAST MEDICAL HISTORY:  Past Medical History:   Diagnosis Date     Acne      CAD (coronary artery disease) 2017    RCA STEMI s/p balloon angioplasty and multiple Sofie to RCA, LCx, and LAD     Cardiogenic shock (H)      DVT (deep venous thrombosis) (H) 2017    left internal jugular (line-associated); left common femoral; on coumadin     Ischemic cardiomyopathy 2017    EF 30-35%     Ischemic stroke (H) 2017    no residual deficits     Lower GI bleed 2017    due to rectal ulcer     Mitral valve insufficiency, ischemic 2017    s/p Mitral Clip placement      Skin ulcer of sacrum (H)      Systolic heart failure (H)      Type 2 diabetes mellitus (H)        FAMILY " HISTORY:  Family History   Problem Relation Age of Onset     Hypertension Mother      Type 2 Diabetes Father      Hypertension Father      Myocardial Infarction No family hx of      CEREBROVASCULAR DISEASE No family hx of      Coronary Artery Disease Early Onset No family hx of      Colon Cancer No family hx of      Prostate Cancer No family hx of        SOCIAL HISTORY:  Social History     Social History     Marital status:      Spouse name: N/A     Number of children: N/A     Years of education: N/A     Occupational History     [employer]      Social History Main Topics     Smoking status: Former Smoker     Packs/day: 0.50     Years: 30.00     Types: Cigarettes     Quit date: 1/1/2017     Smokeless tobacco: Never Used     Alcohol use No     Drug use: No     Sexual activity: Not Currently     Other Topics Concern     None     Social History Narrative    . Remarried.    4 children with first marriage.    2 grand children.    Walks daily, but no formal exercise.            CURRENT MEDICATIONS:  Current Outpatient Prescriptions   Medication Sig Dispense Refill     atorvastatin (LIPITOR) 40 MG tablet 1 tablet (40 mg) by Oral or Feeding Tube route daily 60 tablet 7     bumetanide (BUMEX) 0.5 MG tablet Take 1 tablet (0.5 mg) by mouth every other day 45 tablet 3     cetirizine (ZYRTEC) 10 MG tablet Take 10 mg by mouth daily       clopidogrel (PLAVIX) 75 MG tablet Take 1 tablet (75 mg) by mouth daily 60 tablet 11     digoxin (LANOXIN) 125 MCG tablet Take one tablet on M, W, F, Sunday. 60 tablet 5     finasteride (PROSCAR) 5 MG tablet Take 1 tablet (5 mg) by mouth daily 60 tablet 11     glipiZIDE (GLUCOTROL) 5 MG tablet Take 1 tablet (5 mg) by mouth 2 times daily (before meals) 180 tablet 3     lisinopril (PRINIVIL/ZESTRIL) 2.5 MG tablet Take 1 tablet (2.5 mg) by mouth 2 times daily 90 tablet 3     metFORMIN (GLUCOPHAGE-XR) 500 MG 24 hr tablet Take 1 tablet (500 mg) by mouth daily (with dinner) 180  tablet 3     metoprolol succinate (TOPROL-XL) 50 MG 24 hr tablet Take 2 tablets (100 mg) by mouth daily 180 tablet 3     pantoprazole (PROTONIX) 40 MG EC tablet Take 1 tablet (40 mg) by mouth daily 30 tablet 0     potassium chloride SA (K-DUR/KLOR-CON M) 20 MEQ CR tablet Take 1 tablet (20 mEq) by mouth daily 90 tablet 3     alcohol swab prep pads Use to swab area of injection/mary alice as directed 3 x per day (Patient not taking: Reported on 3/12/2018) 100 each 11     aspirin 81 MG chewable tablet Take 1 tablet (81 mg) by mouth daily (Patient not taking: Reported on 3/12/2018) 36 tablet 3     ASPIRIN NOT PRESCRIBED (INTENTIONAL) Please choose reason not prescribed, below (Patient not taking: Reported on 3/12/2018) 0 each 0     Benzoyl Peroxide 2.5 % CREA Externally apply topically daily (Patient not taking: Reported on 3/12/2018) 21 g 11     insulin pen needle (BD KYLEE U/F) 32G X 4 MM Use 3 daily or as directed. (Patient not taking: Reported on 3/12/2018) 100 each prn     ONETOUCH ULTRA test strip USE TO TEST BLOOD SUGAR 3 TIMES DAILY OR AS DIRECTED. (Patient not taking: Reported on 3/23/2018) 100 strip 3     Sharps Container MISC 1 each every 30 days (Patient not taking: Reported on 3/12/2018) 1 each 0     tamsulosin (FLOMAX) 0.4 MG capsule Take 1 capsule (0.4 mg) by mouth daily (Patient not taking: Reported on 3/12/2018) 60 capsule 0     tretinoin (RETIN-A) 0.05 % cream Spread a pea size amount into affected area topically at bedtime.  Use sunscreen SPF>20. (Patient not taking: Reported on 3/12/2018) 45 g 11     Warfarin Therapy Reminder 1 each continuous prn (Patient not taking: Reported on 3/12/2018)       zinc sulfate (ZINCATE) 220 (50 ZN) MG capsule Take 1 capsule (220 mg) by mouth daily (Patient not taking: Reported on 4/18/2018) 2 capsule 0       ROS:  Constitutional: Denies fever, chills, or diaphoresis. Weight stable.   ENT: Denies visual disturbance, hearing loss, epistaxis, vertigo   Respiratory:  See  "HPI  Cardiovascular: As per HPI.   GI: Denies nausea, vomiting, diarrhea, hematemesis, melena, or hematochezia.   : Denies urinary frequency, dysuria, or hematuria.   Integument: +Acne.  Negative for bruising, rash, or pruritis.  Psychiatric: Negative for anxiety, depression, sleep disturbance, or mood changes.   Neuro: Negative for headaches, syncope, numbness or tingling.   Endocrinology: +DM  Musculoskeletal: Negative for gout, joint stiffness, swelling, or muscle weakness    EXAM:  /86 (BP Location: Left arm, Patient Position: Chair, Cuff Size: Adult Regular)  Pulse 80  Ht 1.575 m (5' 2\")  Wt 58 kg (127 lb 12.8 oz)  SpO2 100%  BMI 23.37 kg/m2  General: alert, articulate, and in no acute distress.  HEENT: normocephalic, atraumatic, anicteric sclera, EOMI, normal palate, mucosa moist, dentition intact, no cyanosis.    Neck: neck supple.  No adenopathy, masses, or carotid bruits.  JVP not appreciated.   Heart: regular rhythm, normal S1/S2, no murmur, gallop, rub.  Precordium quiet with normal PMI.     Lungs: clear, no rales, ronchi, or wheezing.  No accessory muscle use, respirations unlabored.   Abdomen: soft, non-tender, bowel sounds present, no hepatomegaly  Extremities: No cyanosis or edema.  2+ pedal pulses.   Neurological: alert and oriented x 3.  normal speech and affect, no gross motor deficits  Skin:  No ecchymoses, rashes, or clubbing.    Labs:  CBC RESULTS:  Lab Results   Component Value Date    WBC 11.2 (H) 10/27/2017    RBC 4.48 10/27/2017    HGB 13.5 10/27/2017    HCT 42.2 10/27/2017    MCV 94 10/27/2017    MCH 30.1 10/27/2017    MCHC 32.0 10/27/2017    RDW 13.0 10/27/2017     10/27/2017       CMP RESULTS:  Lab Results   Component Value Date     05/16/2018    POTASSIUM 4.6 05/16/2018    CHLORIDE 106 05/16/2018    CO2 26 05/16/2018    ANIONGAP 8 05/16/2018     (H) 05/16/2018    BUN 32 (H) 05/16/2018    CR 1.24 05/16/2018    GFRESTIMATED 62 05/16/2018    GFRESTBLACK 74 " 2018    ROB 9.0 2018    BILITOTAL 0.6 2017    ALBUMIN 4.1 2017    ALKPHOS 63 2017    ALT 19 2017    AST 15 2017        INR RESULTS:  Lab Results   Component Value Date    INR 2.3 (A) 2017    INR 2.36 (H) 2017       No components found for: CK  Lab Results   Component Value Date    MAG 2.4 (H) 2017     Lab Results   Component Value Date    NTBNP 1599 (H) 2017       Most recent echocardiogram:  Recent Results (from the past 8760 hour(s))   Echocardiogram Complete    Narrative    [id number]  [id number]  [id number]  [id number]^SHANTELL^SYDNEE^           [facility]  Diagnostic and [facility]-3rd Floor  [address] [place] [address]     Name: SYDNIE SIERRA  MRN: [id number]  : 1965  Study Date: 2018 07:55 AM  Age: 52 yrs  Gender: Male  [place] [facility] [place]  Reason For Study: 1 year s/p MitraClip  Ordering Physician: SYDNEE STINSON  Referring Physician: SYDNEE STINSON  Performed By: RAE Agarwal     BSA: 1.6 m2  Height: 62 in  Weight: 126 lb  BP: 130/91 mmHg  _____________________________________________________________________________  __        Procedure  Echocardiogram with two-dimensional, color and spectral Doppler performed.  _____________________________________________________________________________  __        Interpretation Summary  The Ejection Fraction is estimated at 30-35%. Left ventricular size is normal.  Global right ventricular function is mildly reduced. The right ventricle is  normal size.  s/p MitralClip with MV mPG of 4mmHg at 90 bpm. Trace mitral insufficiency is  present.  Moderate tricuspid insufficiency is present.  This study was compared with the study from 12/15/17 .  There has been no change.  _____________________________________________________________________________  __        Left Ventricle  Left ventricular size is normal. Left ventricular wall  thickness is normal.  The Ejection Fraction is estimated at 30-35%. Grade III or advanced diastolic  dysfunction. Akinesis of the basal to apical inferior, basal infero-lateral  and infero-septal segments.     Right Ventricle  The right ventricle is normal size. Global right ventricular function is  mildly reduced. A pacemaker lead is noted in the right ventricle.     Atria  Both atria appear normal. The atrial septum is intact as assessed by color  Doppler . A pacemaker lead is noted in the right atrium.     Mitral Valve  Trace mitral insufficiency is present. s/p MitralClip with MV mPG of 4mmHg at  90 bpm.        Aortic Valve  Aortic valve is normal in structure and function. The aortic valve is  tricuspid.     Tricuspid Valve  Moderate tricuspid insufficiency is present. The right ventricular systolic  pressure is approximated at 41.7 mmHg plus the right atrial pressure. Etiology  of TR is leaflet restriction by ICD lead.     Pulmonic Valve  The pulmonic valve is normal. Trace pulmonic insufficiency is present.     Vessels  The aorta root is normal. The pulmonary artery is normal. The inferior vena  cava was normal in size with preserved respiratory variability.     Pericardium  No pericardial effusion is present.        Compared to Previous Study  This study was compared with the study from 12/15/17 . There has been no  change.     Attestation  I have personally viewed the imaging and agree with the interpretation and  report as documented by the fellow, Kirby Rodriguez, and/or edited by me.  _____________________________________________________________________________  __     MMode/2D Measurements & Calculations  IVSd: 0.84 cm  LVIDd: 5.0 cm  LVIDs: 4.5 cm  LVPWd: 0.89 cm  FS: 9.6 %  LV mass(C)d: 149.1 grams  LV mass(C)dI: 94.9 grams/m2  Ao root diam: 2.6 cm  asc Aorta Diam: 3.0 cm  LVOT diam: 2.0 cm  LVOT area: 3.1 cm2     EF(MOD-bp): 26.7 %  LA Volume (BP): 42.8 ml     LA Volume Index (BP): 27.3 ml/m2  RWT:  0.36        Doppler Measurements & Calculations  MV E max frank: 129.0 cm/sec  MV A max frank: 62.4 cm/sec  MV E/A: 2.1  MV max PG: 10.9 mmHg  MV mean PG: 3.6 mmHg  MV V2 VTI: 26.1 cm  MVA(VTI): 1.4 cm2  MV dec time: 0.10 sec  Ao V2 max: 97.9 cm/sec  Ao max P.0 mmHg  Ao V2 mean: 71.0 cm/sec  Ao mean P.3 mmHg  Ao V2 VTI: 18.3 cm  MICHELLE(I,D): 2.0 cm2  MICHELLE(V,D): 2.2 cm2  LV V1 max P.9 mmHg  LV V1 max: 68.9 cm/sec  LV V1 VTI: 12.0 cm  SV(LVOT): 37.2 ml  SI(LVOT): 23.7 ml/m2  PA V2 max: 65.2 cm/sec  PA max P.7 mmHg  PA acc time: 0.13 sec  PI end-d frank: 160.2 cm/sec  TR max frank: 322.8 cm/sec  TR max P.7 mmHg  AV Frank Ratio (DI): 0.70     MICHELLE Index (cm2/m2): 1.3  E/E' av.1  Lateral E/e': 32.3  Medial E/e': 35.8     _____________________________________________________________________________  __           Report approved by: Isabel Love 2018 09:49 AM      Echocardiogram Limited    Narrative    [id number]  [id number]  [id number]  [id number]^MEGHAN^IVA^AMBER           [facility]  Diagnostic and [facility]-3rd Floor  [address] [place] [address]     Name: SYDNIE SIERRA  MRN: [id number]  : 1965  Study Date: 12/15/2017 10:52 AM  Age: 52 yrs  Gender: Male  Patient Location: [facility] [place]  Reason For Study: s/p Karena Clip  Ordering Physician: IVA CHRISTIANSON  Referring Physician: IVA CHRISTIANSON  Performed By: Sandra Fields RDCS     BSA: 1.6 m2  Height: 62 in  Weight: 123 lb  BP: 100/74 mmHg  _____________________________________________________________________________  __        Procedure  Limited Echocardiogram with portions of two-dimensional, color and spectral  Doppler performed.  _____________________________________________________________________________  __        Interpretation Summary  Moderately reduced systolic function. EF 30-35%. Global hypokinesis with  akinesis of the basal-mid inferior and basal-mid inferolateral walls.  Global  right ventricular function is mildly reduced.  Status post transcatheter mitral valve repair with Mitraclip. The clip is  attached to the leaflets. There is trace to mild mitral regurgitation. Mean  gradient 5 mmHg at heart rate 97 bpm.  Pulmonary hypertension with right ventricular systolic pressure 41mmHg above  the right atrial pressure present.  No pericardial effusion.     Compared to the prior study 8/10/17 there has been no significant change.  _____________________________________________________________________________  __        Left Ventricle  Left ventricular wall thickness is normal. Mild left ventricular dilation is  present. Global hypokinesis with akinesis of the basal-mid inferior and basal-  mid inferolateral walls. Moderately reduced systolic function. EF 30-35%.  Diastolic function not assessed.     Right Ventricle  The right ventricle is normal size. Global right ventricular function is  mildly reduced. A pacemaker lead is noted in the right ventricle.     Atria  Both atria appear normal.     Mitral Valve  Status post transcatheter mitral valve repair with Mitraclip. The clip is  attached to the leaflets. There is trace to mild mitral regurgitation. Mean  gradient 5 mmHg at heart rate 97 bpm.        Aortic Valve  Aortic valve is normal in structure and function.     Tricuspid Valve  Mild tricuspid insufficiency is present. Right ventricular systolic pressure  is 41mmHg above the right atrial pressure.     Pulmonic Valve  The pulmonic valve is normal.     Vessels  The aorta root is normal. The inferior vena cava was normal in size with  preserved respiratory variability. Estimated mean right atrial pressure is 3  mmHg.     Pericardium  No pericardial effusion is present.        Attestation  I have personally viewed the imaging and agree with the interpretation and  report as documented by the fellow, David Hill, and/or edited by  me.  _____________________________________________________________________________  __  MMode/2D Measurements & Calculations     IVSd: 0.72 cm  LVIDd: 4.9 cm  LVIDs: 4.2 cm  LVPWd: 0.73 cm  FS: 14.2 %  EDV(Teich): 115.5 ml  ESV(Teich): 80.6 ml  LV mass(C)d: 118.2 grams  LV mass(C)dI: 76.1 grams/m2     EF(MOD-bp): 34.1 %  LA Volume (BP): 49.5 ml  LA Volume Index (BP): 31.9 ml/m2  RWT: 0.30           Doppler Measurements & Calculations  MV E max isable: 126.4 cm/sec  MV A max isabel: 46.5 cm/sec  MV E/A: 2.7  MV max P.7 mmHg  MV mean P.6 mmHg  MV V2 VTI: 26.5 cm  MV dec time: 0.13 sec  TR max isabel: 321.2 cm/sec  TR max P.3 mmHg     _____________________________________________________________________________  __           Report approved by: Isabel Alex 12/15/2017 12:24 PM      ECHO COMPLETE WITH OPTISON    Narrative    [id number]  [id number]  [id number]  [id number]^MEGHAN^IVA^AMBER           [facility]  Diagnostic and [facility]-3rd Floor  [address] [place] [id number]     Name: SYDNIE SIERRA  MRN: [id number]  : 1965  Study Date: 08/10/2017 09:02 AM  Age: 52 yrs  Gender: Male  Patient Location: [facility]  Reason For Study: Nonrheumatic mitral (valve) insufficiency  Ordering Physician: IVA CHRISTIANSON  Referring Physician: IVA CHRISTIANSON  Performed By: True Newman RDCS     BSA: 1.5 m2  Height: 62 in  Weight: 118 lb  _____________________________________________________________________________  __        Procedure  Echocardiogram with two-dimensional, color and spectral Doppler performed.  _____________________________________________________________________________  __        Interpretation Summary  Moderately (EF 30-35%) reduced left ventricular function is present (bi-plane  32%). Basal lateral, inferior, and inferoseptal hypokinesis.  Global RV function mildly reduced.  S/p trans-catheter MVR 2017. Mild mitral insufficiency is present.  Mean  valve gradient 5 mmHg at a heart rate of 104 bpm.  Right ventricular systolic pressure is 40mmHg above the right atrial pressure.  The inferior vena cava is normal in size with preserved respiratory  variability.  No pericardial effusion is present.  _____________________________________________________________________________  __        Left Ventricle  Left ventricular wall thickness is normal. Left ventricular size is normal.  Diastolic function cannot be assessedBiplane traced at 32%. Moderately (EF 35-  40%) reduced left ventricular function is present. Basal lateral and inferior,  basal and mid inferoseptal, basal inferior hypokinesis.     Right Ventricle  The right ventricle is normal size. Global right ventricular function is  mildly reduced.     Atria  The right atria appears normal. Mild left atrial enlargement is present. The  atrial septum is intact as assessed by color Doppler .        Mitral Valve  S/p mitral valve repair. Mean gradient across the valve is 5 mmHg. Mild mitral  annular calcification is present. Mild mitral insufficiency is present. The  mean mitral valve gradient is 4.6 mmHg.     Aortic Valve  The aortic valve is tricuspid. Trace aortic insufficiency is present.     Tricuspid Valve  Mild tricuspid insufficiency is present. Right ventricular systolic pressure  is 40mmHg above the right atrial pressure.     Pulmonic Valve  The pulmonic valve is normal. Trace pulmonic insufficiency is present.     Vessels  The aorta root is normal. The inferior vena cava was normal in size with  preserved respiratory variability. Estimated mean right atrial pressure is 3  mmHg.     Pericardium  No pericardial effusion is present.        Compared to Previous Study  This study was compared with the study from 6/8/2017 . The RVSP is marginally  higher.     Attestation  I have personally viewed the imaging and agree with the interpretation and  report as documented by the fellow, Dmitriy Merino, and/or  edited by me.  _____________________________________________________________________________  __     MMode/2D Measurements & Calculations  IVSd: 0.73 cm  LVIDd: 5.3 cm  LVIDs: 4.5 cm  LVPWd: 0.46 cm  FS: 14.0 %  EDV(Teich): 134.6 ml  ESV(Teich): 94.8 ml  LV mass(C)d: 103.8 grams  LV mass(C)dI: 67.9 grams/m2  Ao root diam: 2.6 cm  asc Aorta Diam: 3.1 cm  LVOT diam: 1.8 cm  LVOT area: 2.6 cm2        Doppler Measurements & Calculations  MV max P.3 mmHg  MV mean P.6 mmHg  MV V2 VTI: 23.7 cm  TR max isabel: 317.6 cm/sec  TR max P.4 mmHg        _____________________________________________________________________________  __        Report approved by: Isabel Daniel 08/10/2017 03:02 PM      Echocardiogram Limited    Narrative    [id number]  [facility]  [id number]  [id number]^MEGHAN^IVA^AMBER           [facility]  Diagnostic and [facility]-3rd Floor  [address] [place] [address]     Name: SYDNIE SIERRA  MRN: [id number]  : 1965  Study Date: 2017 08:34 AM  Age: 51 yrs  Gender: Male  [place] [facility] [place]  Reason For Study: S/P MV clip  History: S/P MV clip  Ordering Physician: IVA CHRISTIANSON  Referring Physician: IVA CHRISTIANSON  Performed By: Lila Kristofer, RDCS     BSA: 1.5 m2  Height: 61 in  Weight: 120 lb  BP: 100/60 mmHg  _____________________________________________________________________________  __        Procedure  Limited Echocardiogram with portions of two-dimensional, color and spectral  Doppler performed.  _____________________________________________________________________________  __        Interpretation Summary  Moderately (EF 30-35%) reduced left ventricular function is present. Inferior  wall akinesis. Basal and mid inferoseptal and inferolateral hypokinesis.  Global right ventricular function is mildly reduced.  S/p trans-catheter [id number] mitral valve repair (TMVR). Mean gradient of  3.4 mmHg at a HR of 96 bpm. Trace  to mild mitral insufficiency is present.  The inferior vena cava was normal in size with preserved respiratory  variability.  No pericardial effusion is present.  Compared to study from 5/2/17 there is no significant change.  _____________________________________________________________________________  __        Left Ventricle  Left ventricular wall thickness is normal. Left ventricular size is normal.  Moderately (EF 30-35%) reduced left ventricular function is present. Left  ventricular diastolic function is indeterminate. Inferior wall akinesis. Basal  and mid inferoseptal and inferolateral hypokinesis.     Right Ventricle  The right ventricle is normal size. Global right ventricular function is  mildly reduced.     Atria  Both atria appear normal. The atrial septum is intact as assessed by color  Doppler .     Mitral Valve  Trace to mild mitral insufficiency is present. S/p trans-catheter [id number]  mitral valve repair (TMVR). Mean gradient of 3.4 mmHg at a HR of 96 bpm.        Aortic Valve  Aortic valve is normal in structure and function. The aortic valve is  tricuspid.     Tricuspid Valve  The tricuspid valve is normal. Trace tricuspid insufficiency is present. Right  ventricular systolic pressure is 29mmHg above the right atrial pressure.  Pulmonary artery systolic pressure is normal.     Pulmonic Valve  The pulmonic valve cannot be assessed.     Vessels  The inferior vena cava was normal in size with preserved respiratory  variability. The aorta root cannot be assessed. The pulmonary artery cannot be  assessed. Normal pulmonary arteries.     Pericardium  No pericardial effusion is present.        Compared to Previous Study  Compared to study from 5/2/17 there is no significant change.     Attestation  I have personally viewed the imaging and agree with the interpretation and  report as documented by the fellow, Dr. Josue Ibarra, and/or edited by  me.  _____________________________________________________________________________  __     MMode/2D Measurements & Calculations  LA Volume (BP): 32.7 ml  LA Volume Index (BP): 21.5 ml/m2        Doppler Measurements & Calculations  MV E max isabel: 136.7 cm/sec  MV A max isabel: 95.2 cm/sec  MV E/A: 1.4  MV max P.1 mmHg  MV mean PG: 3.4 mmHg  MV V2 VTI: 30.7 cm  MV P1/2t max isabel: 144.2 cm/sec  MV P1/2t: 50.5 msec  MVA(P1/2t): 4.4 cm2  MV dec slope: 836.5 cm/sec2  MV dec time: 0.19 sec     Ao V2 max: 131.5 cm/sec  Ao max P.0 mmHg  Ao V2 mean: 91.3 cm/sec  Ao mean PG: 3.8 mmHg  Ao V2 VTI: 19.3 cm  LV V1 max PG: 3.2 mmHg  LV V1 max: 89.1 cm/sec  LV V1 VTI: 13.4 cm  TR max isabel: 267.1 cm/sec  TR max P.5 mmHg  Lateral E/e': 27.7  Medial E/e': 35.3     _____________________________________________________________________________  __           Report approved by: Isabel Love 2017 10:17 AM            Assessment and Plan:    In summary, Luke is a 52 year old gentleman with ICM who appears to euvolemic today.  I have asked him to decrease his potassium to 10 meq every other day when he takes his Bumex as his potassium level was 4.6 today.  I have also increased his Metoprolol to 150 mg daily.  He will return to CORE clinic in one month.  If his creatinine remains stable, will re-attempt increasing his lisinopril.  I also renewed his handicapped parking sticker for one more year as he does get symptomatic with walking prolonged distances or any incline.      1.  Chronic a heart failure secondary to ICM.  Stage C NYHA Class IIIA  ACEi/ARB:  Lisinopril 2.5 mg twice daily.  BB: Increase Metoprolol  mg daily.   Aldosterone antagonist: contraindicated due to transient renal dysfunction with prior attempts  SCD prophylaxis: ICD  Fluid status: euvolemic  Anticoagulation: None.     Antiplatelet:  ASA dose 81 mg daily.  NSAID use:  Contraindicated.  Avoid use.      2.  CAD:  Stable.  Continue Plavix, ASA,  Metoprolol, Lisinopril, and Atorvastatin      3.  Mitral valve repair s/p mitral clip:  Repeat SAMEERA in one year.    4.  Follow up:  CORE clinic 1 month.  Follow up with Dr. Amaral in August and repeat SAMEERA in one year.        Vandana VENEGAS, [facility]  [facility] Clinic   [phone]

## 2018-05-16 NOTE — MR AVS SNAPSHOT
After Visit Summary   5/16/2018    Luke Henao    MRN: [id number]           Patient Information     Date Of Birth          1965        Visit Information        Provider Department      5/16/2018 11:45 AM Janine Denton Dawn M, NP [facility]        Today's Diagnoses     Coronary artery disease involving native coronary artery of native heart without angina pectoris    -  1    Chronic systolic congestive heart failure (H)          Care Instructions    You were seen today in the Cardiovascular Clinic at the [facility].     Cardiology Providers you saw during your visit: Vandana Zambrano APRN, CNP      Follow up and medication changes:    1. Continue taking your Bumex every other day.   2. Decrease Potassium to 10 mEq every other day  3. Increase Toprol XL to 150 mg daily  4. Try to drink 4 glasses of fluid a day  5. Follow up in [facility] Clinic in 1 month.     Results for LUKE HENAO (MRN [id number]) as of 5/16/2018 12:12   Ref. Range 5/16/2018 11:01   Sodium Latest Ref Range: 133 - 144 mmol/L 140   Potassium Latest Ref Range: 3.4 - 5.3 mmol/L 4.6   Chloride Latest Ref Range: 94 - 109 mmol/L 106   Carbon Dioxide Latest Ref Range: 20 - 32 mmol/L 26   Urea Nitrogen Latest Ref Range: 7 - 30 mg/dL 32 (H)   Creatinine Latest Ref Range: 0.66 - 1.25 mg/dL 1.24   GFR Estimate Latest Ref Range: >60 mL/min/1.7m2 62   GFR Estimate If Black Latest Ref Range: >60 mL/min/1.7m2 74   Calcium Latest Ref Range: 8.5 - 10.1 mg/dL 9.0   Anion Gap Latest Ref Range: 3 - 14 mmol/L 8   Glucose Latest Ref Range: 70 - 99 mg/dL 138 (H)           Please limit your fluid intake to 2 L (68 ounces) daily.  2 Liters a day = 8.5 cups, or 68 ounces.  Please limit your salt intake to 2 grams a day or less.     If you gain 2# in 24 hours or 5# in one week call Soraya Messina RN so we can adjust your medications as needed over the phone.     Please feel free to call me with any questions or  "concerns.       Soraya Messina RN  [facility]  Cardiology Care Coordinator-Heart Failure Clinic     Questions and [phone].   First press #1 for the University and then press #3 for \"Medical Questions\" to reach us Cardiology Nurses.      On Call Cardiologist for after hours or on weekends: [phone]   option #4 and ask to speak to the on-call Cardiologist. Inform them you are a CORE/heart failure patient at the [facility].           If you need a medication refill please contact your pharmacy.  Please allow 3 business days for your refill to be completed.  _______________________________________________________  C.O.R.E. CLINIC Cardiomyopathy, Optimization, Rehabilitation, Education   The C.O.R.E. CLINIC is a heart failure specialty clinic within the [facility] where you will work with specialized nurse practitioners dedicated to helping patients with heart failure carefully adjust medications, receive education, and learn who and when to call if symptoms develop. They specialize in helping you better understand your condition, slow the progression of your disease, improve the length and quality of your life, help you detect future heart problems before they become life threatening, and avoid hospitalizations.  As always, thank you for trusting us with your health care needs!             Follow-ups after your visit        Additional Services     Follow-Up with [facility] Clinic       Schedule with Vandana                  Your next 10 appointments already scheduled     2018 11:30 AM CDT   Lab with [facility]   [facility])    [address] [place] [id number] [phone]            2018 12:00 PM CDT   (Arrive by 11:45 AM)   CORE RETURN with Vandana Zambrano NP   [facility] ([facility])    [address] [place] [address]  [address] [place] [address] " "[place] [address]   [phone]            Aug 29, 2018  9:30 AM CDT   (Arrive by 9:15 AM)   Implanted Defibulator with Uc Cv Device 1   [facility] ([facility])    [address] [place] [address]  [address] [place] [id number]   [phone]            Aug 29, 2018 10:00 AM CDT   (Arrive by 9:45 AM)   Return Visit with Amanda Amaral MD   [facility] ([facility])    [address] [place] [address]  [address] [place] [id number] [phone]              Future tests that were ordered for you today     Open Future Orders        Priority Expected Expires Ordered    Follow-Up with [facility] Clinic Routine 6/20/2018 8/21/2018 5/16/2018            Who to contact     If you have questions or need follow up information about today's clinic visit or your schedule please contact [facility] directly at [phone].  Normal or non-critical lab and imaging results will be communicated to you by Activehourshart, letter or phone within 4 business days after the clinic has received the results. If you do not hear from us within 7 days, please contact the clinic through [employer] or phone. If you have a critical or abnormal lab result, we will notify you by phone as soon as possible.  Submit refill requests through Nextly or call your pharmacy and they will forward the refill request to us. Please allow 3 business days for your refill to be completed.          Additional Information About Your Visit        ActivehoursharAuxmoney Information     Nextly lets you send messages to your doctor, view your test results, renew your prescriptions, schedule appointments and more. To sign up, go to [email or web address] . Click on \"Log in\" on the left side of the screen, which will take you to the Welcome page. Then click on \"Sign up Now\" on the right side of the page.     You will be asked to enter the access code listed below, as well as some personal information. Please follow the directions " "to create your username and password.     Your access code is: [email or web address]  Expires: 2018  6:30 AM     Your access code will  in 90 days. If you need help or a new code, please call your [place] clinic or [phone].        Care EveryWhere ID     This is your Care EveryWhere ID. This could be used by other organizations to access your [place] medical records  [id number]        Your Vitals Were     Pulse Height Pulse Oximetry BMI (Body Mass Index)          80 1.575 m (5' 2\") 100% 23.37 kg/m2         Blood Pressure from Last 3 Encounters:   18 128/86   18 126/88   18 (!) 130/91    Weight from Last 3 Encounters:   18 58 kg (127 lb 12.8 oz)   18 57.4 kg (126 lb 9.6 oz)   18 57.5 kg (126 lb 11.2 oz)              We Performed the Following     Follow-Up with [facility]          Today's Medication Changes          These changes are accurate as of 18  1:06 PM.  If you have any questions, ask your nurse or doctor.               Start taking these medicines.        Dose/Directions    potassium chloride SA 10 MEQ CR tablet   Commonly known as:  K-DUR/KLOR-CON M   Used for:  Chronic systolic congestive heart failure (H)   Started by:  Vandana Zambrano NP        Take 1 pill every other day when you take bumex.   Quantity:  30 tablet   Refills:  3         These medicines have changed or have updated prescriptions.        Dose/Directions    metoprolol succinate 50 MG 24 hr tablet   Commonly known as:  TOPROL-XL   This may have changed:  how much to take   Used for:  Chronic systolic congestive heart failure (H)   Changed by:  Vandana Zambrano NP        Dose:  150 mg   Take 3 tablets (150 mg) by mouth daily   Quantity:  180 tablet   Refills:  3            Where to get your medicines      These medications were sent to [facility]/pharmacy #[id number] [place], [place] - [address] [address]     Phone:  [phone]     metoprolol succinate " 50 MG 24 hr tablet    potassium chloride SA 10 MEQ CR tablet                Primary Care Provider Office Phone # Fax #    Shanna Church [phone]       [address] [facility] [address]        Equal Access to Services     CUBA AYALA : Hadii linda ku hadchiloo Soomaali, waaxda luqadaha, qaybta kaalmada adeegyada, karen stewartn adejo cordova lahersonjoann rosen. So [facility] [phone].    ATENCIÓN: Si habla español, tiene a suaerz disposición servicios gratuitos de asistencia lingüística. Llame al [phone].    We comply with applicable federal civil rights laws and [place] laws. We do not discriminate on the basis of race, color, national origin, age, disability, sex, sexual orientation, or gender identity.            Thank you!     Thank you for choosing [facility]  for your care. Our goal is always to provide you with excellent care. Hearing back from our patients is one way we can continue to improve our services. Please take a few minutes to complete the written survey that you may receive in the mail after your visit with us. Thank you!             Your Updated Medication List - Protect others around you: Learn how to safely use, store and throw away your medicines at www.disposemymeds.org.          This list is accurate as of 5/16/18  1:06 PM.  Always use your most recent med list.                   Brand Name Dispense Instructions for use Diagnosis    alcohol swab prep pads     100 each    Use to swab area of injection/mary alice as directed 3 x per day        aspirin 81 MG chewable tablet     36 tablet    Take 1 tablet (81 mg) by mouth daily    Coronary artery disease involving native coronary artery of native heart without angina pectoris       ASPIRIN NOT PRESCRIBED    INTENTIONAL    0 each    Please choose reason not prescribed, below    Cardiogenic shock (H), Type 2 diabetes mellitus without complication, with long-term current use of insulin (H)       atorvastatin 40 MG tablet     LIPITOR    60 tablet    1 tablet (40 mg) by Oral or Feeding Tube route daily    Coronary artery disease involving native coronary artery of native heart without angina pectoris, Cardiogenic shock (H)       Benzoyl Peroxide 2.5 % Crea     21 g    Externally apply topically daily    Acne vulgaris       bumetanide 0.5 MG tablet    BUMEX    45 tablet    Take 1 tablet (0.5 mg) by mouth every other day    Ischemic cardiomyopathy       cetirizine 10 MG tablet    zyrTEC     Take 10 mg by mouth daily        clopidogrel 75 MG tablet    PLAVIX    60 tablet    Take 1 tablet (75 mg) by mouth daily    ST elevation myocardial infarction (STEMI), unspecified artery (H)       digoxin 125 MCG tablet    LANOXIN    60 tablet    Take one tablet on M, W, F, Sunday.    Coronary artery disease involving native coronary artery of native heart without angina pectoris       finasteride 5 MG tablet    PROSCAR    60 tablet    Take 1 tablet (5 mg) by mouth daily    Hypertrophy of prostate with urinary obstruction       glipiZIDE 5 MG tablet    GLUCOTROL    180 tablet    Take 1 tablet (5 mg) by mouth 2 times daily (before meals)    Type 2 diabetes mellitus without complication, without long-term current use of insulin (H)       insulin pen needle 32G X 4 MM    BD KYLEE U/F    100 each    Use 3 daily or as directed.    Diabetes mellitus type 2, insulin dependent (H)       lisinopril 2.5 MG tablet    PRINIVIL/Zestril    90 tablet    Take 1 tablet (2.5 mg) by mouth 2 times daily    Ischemic cardiomyopathy       metFORMIN 500 MG 24 hr tablet    GLUCOPHAGE-XR    180 tablet    Take 1 tablet (500 mg) by mouth daily (with dinner)    Type 2 diabetes mellitus without complication, without long-term current use of insulin (H)       metoprolol succinate 50 MG 24 hr tablet    TOPROL-XL    180 tablet    Take 3 tablets (150 mg) by mouth daily    Chronic systolic congestive heart failure (H)       ONETOUCH ULTRA test strip   Generic drug:  blood glucose  monitoring     100 strip    USE TO TEST BLOOD SUGAR 3 TIMES DAILY OR AS DIRECTED.    Diabetes mellitus type 2, insulin dependent (H)       pantoprazole 40 MG EC tablet    PROTONIX    30 tablet    Take 1 tablet (40 mg) by mouth daily    Gastroesophageal reflux disease, esophagitis presence not specified       potassium chloride SA 10 MEQ CR tablet    K-DUR/KLOR-CON M    30 tablet    Take 1 pill every other day when you take bumex.    Chronic systolic congestive heart failure (H)       Sharps Container Misc     1 each    1 each every 30 days    Diabetes mellitus type 2, insulin dependent (H)       tamsulosin 0.4 MG capsule    FLOMAX    60 capsule    Take 1 capsule (0.4 mg) by mouth daily    Ischemic cardiomyopathy       tretinoin 0.05 % cream    RETIN-A    45 g    Spread a pea size amount into affected area topically at bedtime.  Use sunscreen SPF>20.    Acne vulgaris       Warfarin Therapy Reminder      1 each continuous prn    Deep vein thrombosis (DVT) of left lower extremity, unspecified chronicity, unspecified vein (H)       zinc sulfate 220 (50 Zn) MG capsule    ZINCATE    2 capsule    Take 1 capsule (220 mg) by mouth daily    Ischemic cardiomyopathy

## 2018-05-22 DIAGNOSIS — K21.9 GASTROESOPHAGEAL REFLUX DISEASE, ESOPHAGITIS PRESENCE NOT SPECIFIED: ICD-10-CM

## 2018-05-23 RX ORDER — PANTOPRAZOLE SODIUM 40 MG/1
40 TABLET, DELAYED RELEASE ORAL DAILY
Qty: 30 TABLET | Refills: 0 | OUTPATIENT
Start: 2018-05-23

## 2018-05-23 RX ORDER — PANTOPRAZOLE SODIUM 40 MG/1
40 TABLET, DELAYED RELEASE ORAL DAILY
Qty: 90 TABLET | Refills: 3 | Status: SHIPPED | OUTPATIENT
Start: 2018-05-23 | End: 2018-10-01

## 2018-05-30 LAB — FIO2-PRE: 21 %

## 2018-06-08 ENCOUNTER — PRE VISIT (OUTPATIENT)
Dept: CARDIOLOGY | Facility: CLINIC | Age: 53
End: 2018-06-08

## 2018-06-08 DIAGNOSIS — I25.10 CORONARY ARTERY DISEASE INVOLVING NATIVE CORONARY ARTERY OF NATIVE HEART WITHOUT ANGINA PECTORIS: ICD-10-CM

## 2018-06-08 DIAGNOSIS — I25.5 ISCHEMIC CARDIOMYOPATHY: Primary | ICD-10-CM

## 2018-06-13 ENCOUNTER — OFFICE VISIT (OUTPATIENT)
Dept: CARDIOLOGY | Facility: CLINIC | Age: 53
End: 2018-06-13
Attending: NURSE PRACTITIONER
Payer: COMMERCIAL

## 2018-06-13 VITALS
DIASTOLIC BLOOD PRESSURE: 78 MMHG | WEIGHT: 126.1 LBS | HEIGHT: 62 IN | BODY MASS INDEX: 23.21 KG/M2 | HEART RATE: 70 BPM | SYSTOLIC BLOOD PRESSURE: 111 MMHG | OXYGEN SATURATION: 99 %

## 2018-06-13 DIAGNOSIS — I50.22 CHRONIC SYSTOLIC CONGESTIVE HEART FAILURE (H): Primary | ICD-10-CM

## 2018-06-13 DIAGNOSIS — I25.5 ISCHEMIC CARDIOMYOPATHY: ICD-10-CM

## 2018-06-13 DIAGNOSIS — I25.10 CORONARY ARTERY DISEASE INVOLVING NATIVE CORONARY ARTERY OF NATIVE HEART WITHOUT ANGINA PECTORIS: ICD-10-CM

## 2018-06-13 DIAGNOSIS — Z98.890 HISTORY OF MITRAL VALVE REPAIR: ICD-10-CM

## 2018-06-13 LAB
ANION GAP SERPL CALCULATED.3IONS-SCNC: 9 MMOL/L (ref 3–14)
BUN SERPL-MCNC: 26 MG/DL (ref 7–30)
CALCIUM SERPL-MCNC: 8.6 MG/DL (ref 8.5–10.1)
CHLORIDE SERPL-SCNC: 104 MMOL/L (ref 94–109)
CO2 SERPL-SCNC: 25 MMOL/L (ref 20–32)
CREAT SERPL-MCNC: 1.09 MG/DL (ref 0.66–1.25)
DIGOXIN SERPL-MCNC: 0.6 UG/L (ref 0.5–2)
GFR SERPL CREATININE-BSD FRML MDRD: 71 ML/MIN/1.7M2
GLUCOSE SERPL-MCNC: 124 MG/DL (ref 70–99)
POTASSIUM SERPL-SCNC: 4.3 MMOL/L (ref 3.4–5.3)
SODIUM SERPL-SCNC: 138 MMOL/L (ref 133–144)

## 2018-06-13 PROCEDURE — G0463 HOSPITAL OUTPT CLINIC VISIT: HCPCS | Mod: ZF

## 2018-06-13 PROCEDURE — 80048 BASIC METABOLIC PNL TOTAL CA: CPT | Performed by: NURSE PRACTITIONER

## 2018-06-13 PROCEDURE — 36415 COLL VENOUS BLD VENIPUNCTURE: CPT | Performed by: NURSE PRACTITIONER

## 2018-06-13 PROCEDURE — T1013 SIGN LANG/ORAL INTERPRETER: HCPCS | Mod: U3,ZF

## 2018-06-13 PROCEDURE — 99214 OFFICE O/P EST MOD 30 MIN: CPT | Mod: ZP | Performed by: NURSE PRACTITIONER

## 2018-06-13 PROCEDURE — 80162 ASSAY OF DIGOXIN TOTAL: CPT | Performed by: NURSE PRACTITIONER

## 2018-06-13 RX ORDER — LISINOPRIL 2.5 MG/1
TABLET ORAL
Qty: 90 TABLET | Refills: 11 | Status: SHIPPED | OUTPATIENT
Start: 2018-06-13 | End: 2018-10-15

## 2018-06-13 ASSESSMENT — PAIN SCALES - GENERAL: PAINLEVEL: NO PAIN (0)

## 2018-06-13 NOTE — NURSING NOTE
Chief Complaint   Patient presents with     Follow Up For     Return CORE; 52yr old male with a h/o chronic systolic HF secondary to ICM presenting for follow-up with labs prior     Vitals were taken and medications were reconciled.   KATYA Hernandes  12:01 PM

## 2018-06-13 NOTE — PROGRESS NOTES
HPI: Luke is a 52 year old gentleman with a past medical history of ICM, CAD with a RCA STEMI s/p PCI x 7 to RCA, left circumflex (now ), and LAD on 3/27/17 complicated by PEDRO, sepsis, sacral ulcer, DVT, bilateral hemispheric infarcts secondary to watershed ischemia, and cardiogenic shock s/p IABP, who was later transitioned to a subclavian balloon pump and s/p mitral clip for ischemic MR.  He returns for routine follow up and HF medication optimization.      Luke continues to feel very well per his report.  He is able to walk 20 minutes before he starts getting SOB and shaky.  He is tolerating his medications well and has increased his fluid intake to over a liter daily.  Denies SOB at rest,  PND, orthopnea, chest pain, palpitations, lightheadedness, dizziness, near syncopal/syncopal episodes.  His weight is stable.  He complains of lower extremity edema as the day goes on that improves when he rests and elevates his legs.        PAST MEDICAL HISTORY:  Past Medical History:   Diagnosis Date     Acne      CAD (coronary artery disease) 2017    RCA STEMI s/p balloon angioplasty and multiple Sofie to RCA, LCx, and LAD     Cardiogenic shock (H)      DVT (deep venous thrombosis) (H) 2017    left internal jugular (line-associated); left common femoral; on coumadin     Ischemic cardiomyopathy 2017    EF 30-35%     Ischemic stroke (H) 2017    no residual deficits     Lower GI bleed 2017    due to rectal ulcer     Mitral valve insufficiency, ischemic 2017    s/p Mitral Clip placement      Skin ulcer of sacrum (H)      Systolic heart failure (H)      Type 2 diabetes mellitus (H)        FAMILY HISTORY:  Family History   Problem Relation Age of Onset     Hypertension Mother      Type 2 Diabetes Father      Hypertension Father      Myocardial Infarction No family hx of      CEREBROVASCULAR DISEASE No family hx of      Coronary Artery Disease Early Onset No family hx of      Colon Cancer No family hx of      Prostate Cancer  No family hx of        SOCIAL HISTORY:  Social History     Social History     Marital status:      Spouse name: N/A     Number of children: N/A     Years of education: N/A     Occupational History     [employer]      Social History Main Topics     Smoking status: Former Smoker     Packs/day: 0.50     Years: 30.00     Types: Cigarettes     Quit date: 1/1/2017     Smokeless tobacco: Never Used     Alcohol use No     Drug use: No     Sexual activity: Not Currently     Other Topics Concern     None     Social History Narrative    . Remarried.    4 children with first marriage.    2 grand children.    Walks daily, but no formal exercise.            CURRENT MEDICATIONS:  Current Outpatient Prescriptions   Medication Sig Dispense Refill     alcohol swab prep pads Use to swab area of injection/mary alice as directed 3 x per day (Patient not taking: Reported on 3/12/2018) 100 each 11     aspirin 81 MG chewable tablet Take 1 tablet (81 mg) by mouth daily (Patient not taking: Reported on 3/12/2018) 36 tablet 3     ASPIRIN NOT PRESCRIBED (INTENTIONAL) Please choose reason not prescribed, below (Patient not taking: Reported on 3/12/2018) 0 each 0     atorvastatin (LIPITOR) 40 MG tablet 1 tablet (40 mg) by Oral or Feeding Tube route daily 60 tablet 7     Benzoyl Peroxide 2.5 % CREA Externally apply topically daily (Patient not taking: Reported on 3/12/2018) 21 g 11     bumetanide (BUMEX) 0.5 MG tablet Take 1 tablet (0.5 mg) by mouth every other day 45 tablet 3     cetirizine (ZYRTEC) 10 MG tablet Take 10 mg by mouth daily       clopidogrel (PLAVIX) 75 MG tablet Take 1 tablet (75 mg) by mouth daily 60 tablet 11     digoxin (LANOXIN) 125 MCG tablet Take one tablet on M, W, F, Sunday. 60 tablet 5     finasteride (PROSCAR) 5 MG tablet Take 1 tablet (5 mg) by mouth daily 60 tablet 11     glipiZIDE (GLUCOTROL) 5 MG tablet Take 1 tablet (5 mg) by mouth 2 times daily (before meals) 180 tablet 3     insulin pen needle  (BD KYLEE U/F) 32G X 4 MM Use 3 daily or as directed. (Patient not taking: Reported on 3/12/2018) 100 each prn     lisinopril (PRINIVIL/ZESTRIL) 2.5 MG tablet Take 1 tablet (2.5 mg) by mouth 2 times daily 90 tablet 3     metFORMIN (GLUCOPHAGE-XR) 500 MG 24 hr tablet Take 1 tablet (500 mg) by mouth daily (with dinner) 180 tablet 3     metoprolol succinate (TOPROL-XL) 50 MG 24 hr tablet Take 3 tablets (150 mg) by mouth daily 180 tablet 3     ONETOUCH ULTRA test strip USE TO TEST BLOOD SUGAR 3 TIMES DAILY OR AS DIRECTED. (Patient not taking: Reported on 3/23/2018) 100 strip 3     pantoprazole (PROTONIX) 40 MG EC tablet Take 1 tablet (40 mg) by mouth daily 90 tablet 3     potassium chloride SA (K-DUR/KLOR-CON M) 10 MEQ CR tablet Take 1 pill every other day when you take bumex. 30 tablet 3     Sharps Container MISC 1 each every 30 days (Patient not taking: Reported on 3/12/2018) 1 each 0     tamsulosin (FLOMAX) 0.4 MG capsule Take 1 capsule (0.4 mg) by mouth daily (Patient not taking: Reported on 3/12/2018) 60 capsule 0     tretinoin (RETIN-A) 0.05 % cream Spread a pea size amount into affected area topically at bedtime.  Use sunscreen SPF>20. (Patient not taking: Reported on 3/12/2018) 45 g 11     Warfarin Therapy Reminder 1 each continuous prn (Patient not taking: Reported on 3/12/2018)       zinc sulfate (ZINCATE) 220 (50 ZN) MG capsule Take 1 capsule (220 mg) by mouth daily (Patient not taking: Reported on 4/18/2018) 2 capsule 0       ROS:  Constitutional: Denies fever, chills, or diaphoresis. Weight stable.   ENT: Denies visual disturbance, hearing loss, epistaxis, vertigo   Respiratory:  See HPI  Cardiovascular: As per HPI.   GI: Denies nausea, vomiting, diarrhea, hematemesis, melena, or hematochezia.   : Denies urinary frequency, dysuria, or hematuria.   Integument: +Acne.  Negative for bruising, rash, or pruritis.  Psychiatric: Negative for anxiety, depression, sleep disturbance, or mood changes.   Neuro:  "Negative for headaches, syncope, numbness or tingling.   Endocrinology: +DM  Musculoskeletal: Negative for gout, joint stiffness, swelling, or muscle weakness    EXAM:  /78 (BP Location: Left arm, Patient Position: Chair, Cuff Size: Adult Regular)  Pulse 70  Ht 1.575 m (5' 2\")  Wt 57.2 kg (126 lb 1.6 oz)  SpO2 99%  BMI 23.06 kg/m2  General: alert, articulate, and in no acute distress.  HEENT: normocephalic, atraumatic, anicteric sclera, EOMI, normal palate, mucosa moist, dentition intact, no cyanosis.    Neck: neck supple.  No adenopathy, masses, or carotid bruits.  JVP 9 cm.   Heart: regular rhythm, normal S1/S2, no murmur, gallop, rub.  Precordium quiet with normal PMI.     Lungs: Clear.  No rales, ronchi, or wheezing.  No accessory muscle use, respirations unlabored.   Abdomen: soft, non-tender, bowel sounds present, no hepatomegaly  Extremities: No cyanosis or edema. 2+ pedal pulses.   Neurological: Alert and oriented x 3.  Normal speech and affect, no gross motor deficits  Skin:  No ecchymoses, rashes, or clubbing.    Labs:  CBC RESULTS:  Lab Results   Component Value Date    WBC 11.2 (H) 10/27/2017    RBC 4.48 10/27/2017    HGB 13.5 10/27/2017    HCT 42.2 10/27/2017    MCV 94 10/27/2017    MCH 30.1 10/27/2017    MCHC 32.0 10/27/2017    RDW 13.0 10/27/2017     10/27/2017       CMP RESULTS:  Lab Results   Component Value Date     05/16/2018    POTASSIUM 4.6 05/16/2018    CHLORIDE 106 05/16/2018    CO2 26 05/16/2018    ANIONGAP 8 05/16/2018     (H) 05/16/2018    BUN 32 (H) 05/16/2018    CR 1.24 05/16/2018    GFRESTIMATED 62 05/16/2018    GFRESTBLACK 74 05/16/2018    ROB 9.0 05/16/2018    BILITOTAL 0.6 09/26/2017    ALBUMIN 4.1 09/26/2017    ALKPHOS 63 09/26/2017    ALT 19 09/26/2017    AST 15 09/26/2017        INR RESULTS:  Lab Results   Component Value Date    INR 2.3 (A) 12/26/2017    INR 2.36 (H) 11/03/2017       No components found for: CK  Lab Results   Component Value Date    " MAG 2.4 (H) 2017     Lab Results   Component Value Date    NTBNP 1599 (H) 2017       Most recent echocardiogram:  Recent Results (from the past 8760 hour(s))   Echocardiogram Complete    Narrative    [id number]  [id number]  [id number]  [id number]^SHANTELL^SYDNEE^           [facility]  Diagnostic and [facility]reamten-3rd Floor  [address] [place] [address]     Name: SYDNIE SIERRA  MRN: [id number]  : 1965  Study Date: 2018 07:55 AM  Age: 52 yrs  Gender: Male  [place] [facility] [place] For Study: 1 year s/p MitraClip  Ordering Physician: SYDNEE STINSON  Referring Physician: SYDNEE STINSON  Performed By: RAE Agarwal     BSA: 1.6 m2  Height: 62 in  Weight: 126 lb  BP: 130/91 mmHg  _____________________________________________________________________________  __        Procedure  Echocardiogram with two-dimensional, color and spectral Doppler performed.  _____________________________________________________________________________  __        Interpretation Summary  The Ejection Fraction is estimated at 30-35%. Left ventricular size is normal.  Global right ventricular function is mildly reduced. The right ventricle is  normal size.  s/p MitralClip with MV mPG of 4mmHg at 90 bpm. Trace mitral insufficiency is  present.  Moderate tricuspid insufficiency is present.  This study was compared with the study from 12/15/17 .  There has been no change.  _____________________________________________________________________________  __        Left Ventricle  Left ventricular size is normal. Left ventricular wall thickness is normal.  The Ejection Fraction is estimated at 30-35%. Grade III or advanced diastolic  dysfunction. Akinesis of the basal to apical inferior, basal infero-lateral  and infero-septal segments.     Right Ventricle  The right ventricle is normal size. Global right ventricular function is  mildly reduced. A pacemaker lead is  noted in the right ventricle.     Atria  Both atria appear normal. The atrial septum is intact as assessed by color  Doppler . A pacemaker lead is noted in the right atrium.     Mitral Valve  Trace mitral insufficiency is present. s/p MitralClip with MV mPG of 4mmHg at  90 bpm.        Aortic Valve  Aortic valve is normal in structure and function. The aortic valve is  tricuspid.     Tricuspid Valve  Moderate tricuspid insufficiency is present. The right ventricular systolic  pressure is approximated at 41.7 mmHg plus the right atrial pressure. Etiology  of TR is leaflet restriction by ICD lead.     Pulmonic Valve  The pulmonic valve is normal. Trace pulmonic insufficiency is present.     Vessels  The aorta root is normal. The pulmonary artery is normal. The inferior vena  cava was normal in size with preserved respiratory variability.     Pericardium  No pericardial effusion is present.        Compared to Previous Study  This study was compared with the study from 12/15/17 . There has been no  change.     Attestation  I have personally viewed the imaging and agree with the interpretation and  report as documented by the fellow, Kirby Rodriguez, and/or edited by me.  _____________________________________________________________________________  __     MMode/2D Measurements & Calculations  IVSd: 0.84 cm  LVIDd: 5.0 cm  LVIDs: 4.5 cm  LVPWd: 0.89 cm  FS: 9.6 %  LV mass(C)d: 149.1 grams  LV mass(C)dI: 94.9 grams/m2  Ao root diam: 2.6 cm  asc Aorta Diam: 3.0 cm  LVOT diam: 2.0 cm  LVOT area: 3.1 cm2     EF(MOD-bp): 26.7 %  LA Volume (BP): 42.8 ml     LA Volume Index (BP): 27.3 ml/m2  RWT: 0.36        Doppler Measurements & Calculations  MV E max isabel: 129.0 cm/sec  MV A max isabel: 62.4 cm/sec  MV E/A: 2.1  MV max PG: 10.9 mmHg  MV mean PG: 3.6 mmHg  MV V2 VTI: 26.1 cm  MVA(VTI): 1.4 cm2  MV dec time: 0.10 sec  Ao V2 max: 97.9 cm/sec  Ao max P.0 mmHg  Ao V2 mean: 71.0 cm/sec  Ao mean P.3 mmHg  Ao V2 VTI: 18.3  cm  MICHELLE(I,D): 2.0 cm2  MICHELLE(V,D): 2.2 cm2  LV V1 max P.9 mmHg  LV V1 max: 68.9 cm/sec  LV V1 VTI: 12.0 cm  SV(LVOT): 37.2 ml  SI(LVOT): 23.7 ml/m2  PA V2 max: 65.2 cm/sec  PA max P.7 mmHg  PA acc time: 0.13 sec  PI end-d frank: 160.2 cm/sec  TR max frank: 322.8 cm/sec  TR max P.7 mmHg  AV Frank Ratio (DI): 0.70     MICHELLE Index (cm2/m2): 1.3  E/E' av.1  Lateral E/e': 32.3  Medial E/e': 35.8     _____________________________________________________________________________  __           Report approved by: Isabel Love 2018 09:49 AM      Echocardiogram Limited    Narrative    [id number]  [facility]  [id number]  [id number]^MEGHAN^IVA^AMBER           [facility]  Diagnostic and Treamtent-3rd Floor  [address] [place] [id number]     Name: SYDNIE SIERRA  MRN: [id number]  : 1965  Study Date: 12/15/2017 10:52 AM  Age: 52 yrs  Gender: Male  Patient [place] [facility] [place]  Reason For Study: s/p Karena Clip  Ordering Physician: IVA CHRISTIANSON  Referring Physician: IVA CHRISTIANSON  Performed By: Sandra Fields RDCS     BSA: 1.6 m2  Height: 62 in  Weight: 123 lb  BP: 100/74 mmHg  _____________________________________________________________________________  __        Procedure  Limited Echocardiogram with portions of two-dimensional, color and spectral  Doppler performed.  _____________________________________________________________________________  __        Interpretation Summary  Moderately reduced systolic function. EF 30-35%. Global hypokinesis with  akinesis of the basal-mid inferior and basal-mid inferolateral walls.  Global right ventricular function is mildly reduced.  Status post transcatheter mitral valve repair with Mitraclip. The clip is  attached to the leaflets. There is trace to mild mitral regurgitation. Mean  gradient 5 mmHg at heart rate 97 bpm.  Pulmonary hypertension with right ventricular systolic pressure 41mmHg above  the right  atrial pressure present.  No pericardial effusion.     Compared to the prior study 8/10/17 there has been no significant change.  _____________________________________________________________________________  __        Left Ventricle  Left ventricular wall thickness is normal. Mild left ventricular dilation is  present. Global hypokinesis with akinesis of the basal-mid inferior and basal-  mid inferolateral walls. Moderately reduced systolic function. EF 30-35%.  Diastolic function not assessed.     Right Ventricle  The right ventricle is normal size. Global right ventricular function is  mildly reduced. A pacemaker lead is noted in the right ventricle.     Atria  Both atria appear normal.     Mitral Valve  Status post transcatheter mitral valve repair with Mitraclip. The clip is  attached to the leaflets. There is trace to mild mitral regurgitation. Mean  gradient 5 mmHg at heart rate 97 bpm.        Aortic Valve  Aortic valve is normal in structure and function.     Tricuspid Valve  Mild tricuspid insufficiency is present. Right ventricular systolic pressure  is 41mmHg above the right atrial pressure.     Pulmonic Valve  The pulmonic valve is normal.     Vessels  The aorta root is normal. The inferior vena cava was normal in size with  preserved respiratory variability. Estimated mean right atrial pressure is 3  mmHg.     Pericardium  No pericardial effusion is present.        Attestation  I have personally viewed the imaging and agree with the interpretation and  report as documented by the fellow, David Hill, and/or edited by me.  _____________________________________________________________________________  __  MMode/2D Measurements & Calculations     IVSd: 0.72 cm  LVIDd: 4.9 cm  LVIDs: 4.2 cm  LVPWd: 0.73 cm  FS: 14.2 %  EDV(Teich): 115.5 ml  ESV(Teich): 80.6 ml  LV mass(C)d: 118.2 grams  LV mass(C)dI: 76.1 grams/m2     EF(MOD-bp): 34.1 %  LA Volume (BP): 49.5 ml  LA Volume Index (BP): 31.9 ml/m2  RWT:  0.30           Doppler Measurements & Calculations  MV E max isabel: 126.4 cm/sec  MV A max isabel: 46.5 cm/sec  MV E/A: 2.7  MV max P.7 mmHg  MV mean P.6 mmHg  MV V2 VTI: 26.5 cm  MV dec time: 0.13 sec  TR max isabel: 321.2 cm/sec  TR max P.3 mmHg     _____________________________________________________________________________  __           Report approved by: Isabel Alex 12/15/2017 12:24 PM      ECHO COMPLETE WITH OPTISON    Narrative    [id number]  ECH73  [id number]  [id number]^MEGHAN^IVA^AMBER           [facility]  [facility] Floor  [address] [place] [address]     Name: SYDNIE SIERRA  MRN: [id number]  : 1965  Study Date: 08/10/2017 09:02 AM  Age: 52 yrs  Gender: Male  [place] [facility] [place]  Reason For Study: Nonrheumatic mitral (valve) insufficiency  Ordering Physician: IVA CHRISTIANSON  Referring Physician: IVA CHRISTIANSON  Performed By: True Newman RDCS     BSA: 1.5 m2  Height: 62 in  Weight: 118 lb  _____________________________________________________________________________  __        Procedure  Echocardiogram with two-dimensional, color and spectral Doppler performed.  _____________________________________________________________________________  __        Interpretation Summary  Moderately (EF 30-35%) reduced left ventricular function is present (bi-plane  32%). Basal lateral, inferior, and inferoseptal hypokinesis.  Global RV function mildly reduced.  S/p trans-catheter MVR 2017. Mild mitral insufficiency is present. Mean  valve gradient 5 mmHg at a heart rate of 104 bpm.  Right ventricular systolic pressure is 40mmHg above the right atrial pressure.  The inferior vena cava is normal in size with preserved respiratory  variability.  No pericardial effusion is present.  _____________________________________________________________________________  __        Left Ventricle  Left ventricular wall  thickness is normal. Left ventricular size is normal.  Diastolic function cannot be assessedBiplane traced at 32%. Moderately (EF 35-  40%) reduced left ventricular function is present. Basal lateral and inferior,  basal and mid inferoseptal, basal inferior hypokinesis.     Right Ventricle  The right ventricle is normal size. Global right ventricular function is  mildly reduced.     Atria  The right atria appears normal. Mild left atrial enlargement is present. The  atrial septum is intact as assessed by color Doppler .        Mitral Valve  S/p mitral valve repair. Mean gradient across the valve is 5 mmHg. Mild mitral  annular calcification is present. Mild mitral insufficiency is present. The  mean mitral valve gradient is 4.6 mmHg.     Aortic Valve  The aortic valve is tricuspid. Trace aortic insufficiency is present.     Tricuspid Valve  Mild tricuspid insufficiency is present. Right ventricular systolic pressure  is 40mmHg above the right atrial pressure.     Pulmonic Valve  The pulmonic valve is normal. Trace pulmonic insufficiency is present.     Vessels  The aorta root is normal. The inferior vena cava was normal in size with  preserved respiratory variability. Estimated mean right atrial pressure is 3  mmHg.     Pericardium  No pericardial effusion is present.        Compared to Previous Study  This study was compared with the study from 6/8/2017 . The RVSP is marginally  higher.     Attestation  I have personally viewed the imaging and agree with the interpretation and  report as documented by the fellow, Dmitriy Merino, and/or edited by me.  _____________________________________________________________________________  __     MMode/2D Measurements & Calculations  IVSd: 0.73 cm  LVIDd: 5.3 cm  LVIDs: 4.5 cm  LVPWd: 0.46 cm  FS: 14.0 %  EDV(Teich): 134.6 ml  ESV(Teich): 94.8 ml  LV mass(C)d: 103.8 grams  LV mass(C)dI: 67.9 grams/m2  Ao root diam: 2.6 cm  asc Aorta Diam: 3.1 cm  LVOT diam: 1.8 cm  LVOT area: 2.6  cm2        Doppler Measurements & Calculations  MV max P.3 mmHg  MV mean P.6 mmHg  MV V2 VTI: 23.7 cm  TR max isabel: 317.6 cm/sec  TR max P.4 mmHg        _____________________________________________________________________________  __        Report approved by: Isabel Daniel 08/10/2017 03:02 PM            Assessment and Plan:    In summary, Luke is a 52 year old gentleman with ICM who continues to remain euvolemic.  I have increased his lisinopril to 5 mg in the am and 2.5 mg in the evening.  He will repeat a BMP in one week.  If his creatinine and BP continues to remain stable, will plan to increase his lisinopril to 5 mg twice daily.      1.  Chronic a heart failure secondary to ICM.  Stage C NYHA Class IIIA  ACEi/ARB:  Increase Lisinopril to 5 mg in the am and 2.5 mg in the evening.  BB: Metoprolol  mg daily.   Aldosterone antagonist: contraindicated due to transient renal dysfunction with prior attempts  SCD prophylaxis: ICD  Fluid status: euvolemic  Anticoagulation: None.     Antiplatelet:  ASA dose 81 mg daily.  NSAID use:  Contraindicated.  Avoid use.      2.  CAD:  Stable.  Continue Plavix, ASA, Metoprolol, Lisinopril, and Atorvastatin      3.  Mitral valve repair s/p mitral clip: Transthoracic echocardiogram due in December per Dr. Amaral's previous recommendations.      4.  Lower extremity edema:  Elevate legs when resting.  Compression stocking prescription mailed to patient.     5.  Follow up:  BMP in one week.  [facility] in July.  Follow up with Dr. Amaral in August with repeat SAMEERA      Vandana VENEGAS, CNP  [facility]   [phone]

## 2018-06-13 NOTE — MR AVS SNAPSHOT
After Visit Summary   2018    Luke Henao    MRN: [id number]           Patient Information     Date Of Birth          1965        Visit Information        Provider Department      2018 12:00 PM Cristobal, Son; Vandana Zambrano NP [facility]        Today's Diagnoses     Chronic systolic congestive heart failure (H)        Ischemic cardiomyopathy          Care Instructions    You were seen today in the Cardiovascular Clinic at the [facility].     Cardiology Providers you saw during your visit: Vandana VENEGAS CNP      Follow up and medication changes:  1. Increase Lisinopril to 2.5 mg in the morning, 5 mg in the evening.   2. Repeat labs in 1 week.   3. Follow up with Vandana in one month!      Results for LUKE HENAO (MRN [id number]) as of 2018 12:16   Ref. Range 2018 11:30   Sodium Latest Ref Range: 133 - 144 mmol/L 138   Potassium Latest Ref Range: 3.4 - 5.3 mmol/L 4.3   Chloride Latest Ref Range: 94 - 109 mmol/L 104   Carbon Dioxide Latest Ref Range: 20 - 32 mmol/L 25   Urea Nitrogen Latest Ref Range: 7 - 30 mg/dL 26   Creatinine Latest Ref Range: 0.66 - 1.25 mg/dL 1.09   GFR Estimate Latest Ref Range: >60 mL/min/1.7m2 71   GFR Estimate If Black Latest Ref Range: >60 mL/min/1.7m2 86   Calcium Latest Ref Range: 8.5 - 10.1 mg/dL 8.6   Anion Gap Latest Ref Range: 3 - 14 mmol/L 9   Glucose Latest Ref Range: 70 - 99 mg/dL 124 (H)         Please limit your fluid intake to 2 L (68 ounces) daily.  2 Liters a day = 8.5 cups, or 68 ounces.  Please limit your salt intake to 2 grams a day or less.     If you gain 2# in 24 hours or 5# in one week call Soraya Messina RN so we can adjust your medications as needed over the phone.     Please feel free to call me with any questions or concerns.       Soraya Messina RN  [facility]  Cardiology Care Coordinator-Heart Failure Clinic     Questions and [phone].   First press #1 for  "the [facility] and then press #3 for \"Medical Questions\" to reach us Cardiology Nurses.      On Call Cardiologist for after hours or on weekends: [phone]   option #4 and ask to speak to the on-call Cardiologist. Inform them you are a CORE/heart failure patient at the [facility].           If you need a medication refill please contact your pharmacy.  Please allow 3 business days for your refill to be completed.  _______________________________________________________  C.O.R.E. CLINIC Cardiomyopathy, Optimization, Rehabilitation, Education   The C.O.R.E. CLINIC is a heart failure specialty clinic within the [facility] where you will work with specialized nurse practitioners dedicated to helping patients with heart failure carefully adjust medications, receive education, and learn who and when to call if symptoms develop. They specialize in helping you better understand your condition, slow the progression of your disease, improve the length and quality of your life, help you detect future heart problems before they become life threatening, and avoid hospitalizations.  As always, thank you for trusting us with your health care needs!             Follow-ups after your visit        Additional Services     Follow-Up with CORE Clinic                 Your next 10 appointments already scheduled     Jun 20, 2018  9:00 AM CDT   LAB with [facility]   [facility] ([facility])    [address] [place] [address] [phone]           Please do not eat 10-12 hours before your appointment if you are coming in fasting for labs on lipids, cholesterol, or glucose (sugar). This does not apply to pregnant women. Water, hot tea and black coffee (with nothing added) are okay. Do not drink other fluids, diet soda or chew gum.            Aug 03, 2018  9:00 AM CDT   Lab with  LAB    [facility] [place] "    [address]  1st Floor  [facility] [address]   [phone]            Aug 03, 2018  9:30 AM CDT   (Arrive by 9:15 AM)   CORE RETURN with Vandana Zambrano NP   [facility] ([facility])    [address]  [address]  [facility] [address]   [phone]            Aug 29, 2018  9:30 AM CDT   (Arrive by 9:15 AM)   Implanted Defibulator with Uc Cv Device 1   [facility] ([facility])    [address] [place] [address]  [address]  [facility] [address]   [phone]            Aug 29, 2018 10:00 AM CDT   (Arrive by 9:45 AM)   Return Visit with Amanda Amaral MD   [facility] ([facility])    [address] [place] [address]  [address]  [facility] [address] [phone]              Future tests that were ordered for you today     Open Future Orders        Priority Expected Expires Ordered    Follow-Up with CORE Clinic Routine 7/18/2018 9/28/2018 6/13/2018    Basic metabolic panel Routine 6/20/2018 6/27/2018 6/13/2018            Who to contact     If you have questions or need follow up information about today's clinic visit or your schedule please contact [facility] directly at [phone].  Normal or non-critical lab and imaging results will be communicated to you by MyChart, letter or phone within 4 business days after the clinic has received the results. If you do not hear from us within 7 days, please contact the clinic through MyChart or phone. If you have a critical or abnormal lab result, we will notify you by phone as soon as possible.  Submit refill requests through [employer] or call your pharmacy and they will forward the refill request to us. Please allow 3 business days for your refill to be completed.          Additional Information About Your Visit        Care EveryWhere ID     This is your Care EveryWhere ID. This could be used by other organizations to access your [facility]  "records  [id number]        Your Vitals Were     Pulse Height Pulse Oximetry BMI (Body Mass Index)          70 1.575 m (5' 2\") 99% 23.06 kg/m2         Blood Pressure from Last 3 Encounters:   06/13/18 111/78   05/16/18 128/86   04/18/18 126/88    Weight from Last 3 Encounters:   06/13/18 57.2 kg (126 lb 1.6 oz)   05/16/18 58 kg (127 lb 12.8 oz)   04/18/18 57.4 kg (126 lb 9.6 oz)              We Performed the Following     Follow-Up with CORE Clinic          Today's Medication Changes          These changes are accurate as of 6/13/18 12:57 PM.  If you have any questions, ask your nurse or doctor.               These medicines have changed or have updated prescriptions.        Dose/Directions    lisinopril 2.5 MG tablet   Commonly known as:  PRINIVIL/Zestril   This may have changed:    - how much to take  - how to take this  - when to take this  - additional instructions   Used for:  Ischemic cardiomyopathy   Changed by:  Vandana Zambrano NP        Take 2.5 mg in the am and 5 mg in the evening.   Quantity:  90 tablet   Refills:  11            Where to get your medicines      These medications were sent to [facility]/pharmacy #[facility] [address] [address]     Phone:  [phone]     lisinopril 2.5 MG tablet                Primary Care Provider Office Phone # Fax #    Shanna Church [phone]       [address] [place] [address]        Equal Access to Services     [facility] Hospital[facility]IL[facility]INO AH: Hadii linda el hadasho Soericka, waaxda luqadaha, qaybta kaalmada adeegmelania, karen rosen. So [facility] [phone].    ATENCIÓN: Si habla español, tiene a suarez disposición servicios gratuitos de asistencia lingüística. Llame al [phone].    We comply with applicable federal civil rights laws and [place] laws. We do not discriminate on the basis of race, color, national origin, age, disability, sex, sexual orientation, or gender " identity.            Thank you!     Thank you for choosing [facility]  for your care. Our goal is always to provide you with excellent care. Hearing back from our patients is one way we can continue to improve our services. Please take a few minutes to complete the written survey that you may receive in the mail after your visit with us. Thank you!             Your Updated Medication List - Protect others around you: Learn how to safely use, store and throw away your medicines at www.disposemymeds.org.          This list is accurate as of 6/13/18 12:57 PM.  Always use your most recent med list.                   Brand Name Dispense Instructions for use Diagnosis    alcohol swab prep pads     100 each    Use to swab area of injection/mary alice as directed 3 x per day        aspirin 81 MG chewable tablet     36 tablet    Take 1 tablet (81 mg) by mouth daily    Coronary artery disease involving native coronary artery of native heart without angina pectoris       ASPIRIN NOT PRESCRIBED    INTENTIONAL    0 each    Please choose reason not prescribed, below    Cardiogenic shock (H), Type 2 diabetes mellitus without complication, with long-term current use of insulin (H)       atorvastatin 40 MG tablet    LIPITOR    60 tablet    1 tablet (40 mg) by Oral or Feeding Tube route daily    Coronary artery disease involving native coronary artery of native heart without angina pectoris, Cardiogenic shock (H)       Benzoyl Peroxide 2.5 % Crea     21 g    Externally apply topically daily    Acne vulgaris       bumetanide 0.5 MG tablet    BUMEX    45 tablet    Take 1 tablet (0.5 mg) by mouth every other day    Ischemic cardiomyopathy       cetirizine 10 MG tablet    zyrTEC     Take 10 mg by mouth daily        clopidogrel 75 MG tablet    PLAVIX    60 tablet    Take 1 tablet (75 mg) by mouth daily    ST elevation myocardial infarction (STEMI), unspecified artery (H)       digoxin 125 MCG tablet    LANOXIN    60 tablet    Take one  tablet on M, W, F, Sunday.    Coronary artery disease involving native coronary artery of native heart without angina pectoris       finasteride 5 MG tablet    PROSCAR    60 tablet    Take 1 tablet (5 mg) by mouth daily    Hypertrophy of prostate with urinary obstruction       glipiZIDE 5 MG tablet    GLUCOTROL    180 tablet    Take 1 tablet (5 mg) by mouth 2 times daily (before meals)    Type 2 diabetes mellitus without complication, without long-term current use of insulin (H)       insulin pen needle 32G X 4 MM    BD KYLEE U/F    100 each    Use 3 daily or as directed.    Diabetes mellitus type 2, insulin dependent (H)       lisinopril 2.5 MG tablet    PRINIVIL/Zestril    90 tablet    Take 2.5 mg in the am and 5 mg in the evening.    Ischemic cardiomyopathy       metFORMIN 500 MG 24 hr tablet    GLUCOPHAGE-XR    180 tablet    Take 1 tablet (500 mg) by mouth daily (with dinner)    Type 2 diabetes mellitus without complication, without long-term current use of insulin (H)       metoprolol succinate 50 MG 24 hr tablet    TOPROL-XL    180 tablet    Take 3 tablets (150 mg) by mouth daily    Chronic systolic congestive heart failure (H)       ONETOUCH ULTRA test strip   Generic drug:  blood glucose monitoring     100 strip    USE TO TEST BLOOD SUGAR 3 TIMES DAILY OR AS DIRECTED.    Diabetes mellitus type 2, insulin dependent (H)       pantoprazole 40 MG EC tablet    PROTONIX    90 tablet    Take 1 tablet (40 mg) by mouth daily    Gastroesophageal reflux disease, esophagitis presence not specified       potassium chloride SA 10 MEQ CR tablet    K-DUR/KLOR-CON M    30 tablet    Take 1 pill every other day when you take bumex.    Chronic systolic congestive heart failure (H)       Sharps Container Misc     1 each    1 each every 30 days    Diabetes mellitus type 2, insulin dependent (H)       tamsulosin 0.4 MG capsule    FLOMAX    60 capsule    Take 1 capsule (0.4 mg) by mouth daily    Ischemic cardiomyopathy        tretinoin 0.05 % cream    RETIN-A    45 g    Spread a pea size amount into affected area topically at bedtime.  Use sunscreen SPF>20.    Acne vulgaris       Warfarin Therapy Reminder      1 each continuous prn    Deep vein thrombosis (DVT) of left lower extremity, unspecified chronicity, unspecified vein (H)       zinc sulfate 220 (50 Zn) MG capsule    ZINCATE    2 capsule    Take 1 capsule (220 mg) by mouth daily    Ischemic cardiomyopathy

## 2018-06-13 NOTE — LETTER
6/13/2018      RE: Luke Henao  [address]  [facility] [id number]       Dear Colleague,    Thank you for the opportunity to participate in the care of your patient, Luke Henao, at the [facility] at [facility]. Please see a copy of my visit note below.      HPI: Luke is a 52 year old gentleman with a past medical history of ICM, CAD with a RCA STEMI s/p PCI x 7 to RCA, left circumflex (now ), and LAD on 3/27/17 complicated by PEDRO, sepsis, sacral ulcer, DVT, bilateral hemispheric infarcts secondary to watershed ischemia, and cardiogenic shock s/p IABP, who was later transitioned to a subclavian balloon pump and s/p mitral clip for ischemic MR.  He returns for routine follow up and HF medication optimization.      Luke continues to feel very well per his report.  He is able to walk 20 minutes before he starts getting SOB and shaky.  He is tolerating his medications well and has increased his fluid intake to over a liter daily.  Denies SOB at rest,  PND, orthopnea, chest pain, palpitations, lightheadedness, dizziness, near syncopal/syncopal episodes.  His weight is stable.  He complains of lower extremity edema as the day goes on that improves when he rests and elevates his legs.        PAST MEDICAL HISTORY:  Past Medical History:   Diagnosis Date     Acne      CAD (coronary artery disease) 2017    RCA STEMI s/p balloon angioplasty and multiple Sofie to RCA, LCx, and LAD     Cardiogenic shock (H)      DVT (deep venous thrombosis) (H) 2017    left internal jugular (line-associated); left common femoral; on coumadin     Ischemic cardiomyopathy 2017    EF 30-35%     Ischemic stroke (H) 2017    no residual deficits     Lower GI bleed 2017    due to rectal ulcer     Mitral valve insufficiency, ischemic 2017    s/p Mitral Clip placement      Skin ulcer of sacrum (H)      Systolic heart failure (H)      Type 2 diabetes mellitus (H)        FAMILY HISTORY:  Family History    Problem Relation Age of Onset     Hypertension Mother      Type 2 Diabetes Father      Hypertension Father      Myocardial Infarction No family hx of      CEREBROVASCULAR DISEASE No family hx of      Coronary Artery Disease Early Onset No family hx of      Colon Cancer No family hx of      Prostate Cancer No family hx of        SOCIAL HISTORY:  Social History     Social History     Marital status:      Spouse name: N/A     Number of children: N/A     Years of education: N/A     Occupational History     [employer]      Social History Main Topics     Smoking status: Former Smoker     Packs/day: 0.50     Years: 30.00     Types: Cigarettes     Quit date: 1/1/2017     Smokeless tobacco: Never Used     Alcohol use No     Drug use: No     Sexual activity: Not Currently     Other Topics Concern     None     Social History Narrative    . Remarried.    4 children with first marriage.    2 grand children.    Walks daily, but no formal exercise.            CURRENT MEDICATIONS:  Current Outpatient Prescriptions   Medication Sig Dispense Refill     alcohol swab prep pads Use to swab area of injection/mary alice as directed 3 x per day (Patient not taking: Reported on 3/12/2018) 100 each 11     aspirin 81 MG chewable tablet Take 1 tablet (81 mg) by mouth daily (Patient not taking: Reported on 3/12/2018) 36 tablet 3     ASPIRIN NOT PRESCRIBED (INTENTIONAL) Please choose reason not prescribed, below (Patient not taking: Reported on 3/12/2018) 0 each 0     atorvastatin (LIPITOR) 40 MG tablet 1 tablet (40 mg) by Oral or Feeding Tube route daily 60 tablet 7     Benzoyl Peroxide 2.5 % CREA Externally apply topically daily (Patient not taking: Reported on 3/12/2018) 21 g 11     bumetanide (BUMEX) 0.5 MG tablet Take 1 tablet (0.5 mg) by mouth every other day 45 tablet 3     cetirizine (ZYRTEC) 10 MG tablet Take 10 mg by mouth daily       clopidogrel (PLAVIX) 75 MG tablet Take 1 tablet (75 mg) by mouth daily 60 tablet  11     digoxin (LANOXIN) 125 MCG tablet Take one tablet on M, W, F, Sunday. 60 tablet 5     finasteride (PROSCAR) 5 MG tablet Take 1 tablet (5 mg) by mouth daily 60 tablet 11     glipiZIDE (GLUCOTROL) 5 MG tablet Take 1 tablet (5 mg) by mouth 2 times daily (before meals) 180 tablet 3     insulin pen needle (BD KYLEE U/F) 32G X 4 MM Use 3 daily or as directed. (Patient not taking: Reported on 3/12/2018) 100 each prn     lisinopril (PRINIVIL/ZESTRIL) 2.5 MG tablet Take 1 tablet (2.5 mg) by mouth 2 times daily 90 tablet 3     metFORMIN (GLUCOPHAGE-XR) 500 MG 24 hr tablet Take 1 tablet (500 mg) by mouth daily (with dinner) 180 tablet 3     metoprolol succinate (TOPROL-XL) 50 MG 24 hr tablet Take 3 tablets (150 mg) by mouth daily 180 tablet 3     ONETOUCH ULTRA test strip USE TO TEST BLOOD SUGAR 3 TIMES DAILY OR AS DIRECTED. (Patient not taking: Reported on 3/23/2018) 100 strip 3     pantoprazole (PROTONIX) 40 MG EC tablet Take 1 tablet (40 mg) by mouth daily 90 tablet 3     potassium chloride SA (K-DUR/KLOR-CON M) 10 MEQ CR tablet Take 1 pill every other day when you take bumex. 30 tablet 3     Sharps Container MISC 1 each every 30 days (Patient not taking: Reported on 3/12/2018) 1 each 0     tamsulosin (FLOMAX) 0.4 MG capsule Take 1 capsule (0.4 mg) by mouth daily (Patient not taking: Reported on 3/12/2018) 60 capsule 0     tretinoin (RETIN-A) 0.05 % cream Spread a pea size amount into affected area topically at bedtime.  Use sunscreen SPF>20. (Patient not taking: Reported on 3/12/2018) 45 g 11     Warfarin Therapy Reminder 1 each continuous prn (Patient not taking: Reported on 3/12/2018)       zinc sulfate (ZINCATE) 220 (50 ZN) MG capsule Take 1 capsule (220 mg) by mouth daily (Patient not taking: Reported on 4/18/2018) 2 capsule 0       ROS:  Constitutional: Denies fever, chills, or diaphoresis. Weight stable.   ENT: Denies visual disturbance, hearing loss, epistaxis, vertigo   Respiratory:  See HPI  Cardiovascular:  "As per HPI.   GI: Denies nausea, vomiting, diarrhea, hematemesis, melena, or hematochezia.   : Denies urinary frequency, dysuria, or hematuria.   Integument: +Acne.  Negative for bruising, rash, or pruritis.  Psychiatric: Negative for anxiety, depression, sleep disturbance, or mood changes.   Neuro: Negative for headaches, syncope, numbness or tingling.   Endocrinology: +DM  Musculoskeletal: Negative for gout, joint stiffness, swelling, or muscle weakness    EXAM:  /78 (BP Location: Left arm, Patient Position: Chair, Cuff Size: Adult Regular)  Pulse 70  Ht 1.575 m (5' 2\")  Wt 57.2 kg (126 lb 1.6 oz)  SpO2 99%  BMI 23.06 kg/m2  General: alert, articulate, and in no acute distress.  HEENT: normocephalic, atraumatic, anicteric sclera, EOMI, normal palate, mucosa moist, dentition intact, no cyanosis.    Neck: neck supple.  No adenopathy, masses, or carotid bruits.  JVP 9 cm.   Heart: regular rhythm, normal S1/S2, no murmur, gallop, rub.  Precordium quiet with normal PMI.     Lungs: Clear.  No rales, ronchi, or wheezing.  No accessory muscle use, respirations unlabored.   Abdomen: soft, non-tender, bowel sounds present, no hepatomegaly  Extremities: No cyanosis or edema. 2+ pedal pulses.   Neurological: Alert and oriented x 3.  Normal speech and affect, no gross motor deficits  Skin:  No ecchymoses, rashes, or clubbing.    Labs:  CBC RESULTS:  Lab Results   Component Value Date    WBC 11.2 (H) 10/27/2017    RBC 4.48 10/27/2017    HGB 13.5 10/27/2017    HCT 42.2 10/27/2017    MCV 94 10/27/2017    MCH 30.1 10/27/2017    MCHC 32.0 10/27/2017    RDW 13.0 10/27/2017     10/27/2017       CMP RESULTS:  Lab Results   Component Value Date     05/16/2018    POTASSIUM 4.6 05/16/2018    CHLORIDE 106 05/16/2018    CO2 26 05/16/2018    ANIONGAP 8 05/16/2018     (H) 05/16/2018    BUN 32 (H) 05/16/2018    CR 1.24 05/16/2018    GFRESTIMATED 62 05/16/2018    GFRESTBLACK 74 05/16/2018    ROB 9.0 05/16/2018 "    BILITOTAL 0.6 2017    ALBUMIN 4.1 2017    ALKPHOS 63 2017    ALT 19 2017    AST 15 2017        INR RESULTS:  Lab Results   Component Value Date    INR 2.3 (A) 2017    INR 2.36 (H) 2017       No components found for: CK  Lab Results   Component Value Date    MAG 2.4 (H) 2017     Lab Results   Component Value Date    NTBNP 1599 (H) 2017       Most recent echocardiogram:  Recent Results (from the past 8760 hour(s))   Echocardiogram Complete    Narrative    [id number]  [id number]  [id number]  [id number]^SHANTELL^SYDNEE^           [facility]  [facility]-3rd Floor  [address] [place] [address]     Name: SYDNIE SIERRA  MRN: [id number]  : 1965  Study Date: 2018 07:55 AM  Age: 52 yrs  Gender: Male  Patient [place] [facility] [place]  Reason For Study: 1 year s/p MitraClip  Ordering Physician: SYDNEE STINSON  Referring Physician: SYDNEE STINSON  Performed By: RAE Agarwal     BSA: 1.6 m2  Height: 62 in  Weight: 126 lb  BP: 130/91 mmHg  _____________________________________________________________________________  __        Procedure  Echocardiogram with two-dimensional, color and spectral Doppler performed.  _____________________________________________________________________________  __        Interpretation Summary  The Ejection Fraction is estimated at 30-35%. Left ventricular size is normal.  Global right ventricular function is mildly reduced. The right ventricle is  normal size.  s/p MitralClip with MV mPG of 4mmHg at 90 bpm. Trace mitral insufficiency is  present.  Moderate tricuspid insufficiency is present.  This study was compared with the study from 12/15/17 .  There has been no change.  _____________________________________________________________________________  __        Left Ventricle  Left ventricular size is normal. Left ventricular wall thickness is normal.  The Ejection  Fraction is estimated at 30-35%. Grade III or advanced diastolic  dysfunction. Akinesis of the basal to apical inferior, basal infero-lateral  and infero-septal segments.     Right Ventricle  The right ventricle is normal size. Global right ventricular function is  mildly reduced. A pacemaker lead is noted in the right ventricle.     Atria  Both atria appear normal. The atrial septum is intact as assessed by color  Doppler . A pacemaker lead is noted in the right atrium.     Mitral Valve  Trace mitral insufficiency is present. s/p MitralClip with MV mPG of 4mmHg at  90 bpm.        Aortic Valve  Aortic valve is normal in structure and function. The aortic valve is  tricuspid.     Tricuspid Valve  Moderate tricuspid insufficiency is present. The right ventricular systolic  pressure is approximated at 41.7 mmHg plus the right atrial pressure. Etiology  of TR is leaflet restriction by ICD lead.     Pulmonic Valve  The pulmonic valve is normal. Trace pulmonic insufficiency is present.     Vessels  The aorta root is normal. The pulmonary artery is normal. The inferior vena  cava was normal in size with preserved respiratory variability.     Pericardium  No pericardial effusion is present.        Compared to Previous Study  This study was compared with the study from 12/15/17 . There has been no  change.     Attestation  I have personally viewed the imaging and agree with the interpretation and  report as documented by the fellow, [place] Rodriguez, and/or edited by me.  _____________________________________________________________________________  __     MMode/2D Measurements & Calculations  IVSd: 0.84 cm  LVIDd: 5.0 cm  LVIDs: 4.5 cm  LVPWd: 0.89 cm  FS: 9.6 %  LV mass(C)d: 149.1 grams  LV mass(C)dI: 94.9 grams/m2  Ao root diam: 2.6 cm  asc Aorta Diam: 3.0 cm  LVOT diam: 2.0 cm  LVOT area: 3.1 cm2     EF(MOD-bp): 26.7 %  LA Volume (BP): 42.8 ml     LA Volume Index (BP): 27.3 ml/m2  RWT: 0.36        Doppler Measurements &  Calculations  MV E max frank: 129.0 cm/sec  MV A max frank: 62.4 cm/sec  MV E/A: 2.1  MV max PG: 10.9 mmHg  MV mean PG: 3.6 mmHg  MV V2 VTI: 26.1 cm  MVA(VTI): 1.4 cm2  MV dec time: 0.10 sec  Ao V2 max: 97.9 cm/sec  Ao max P.0 mmHg  Ao V2 mean: 71.0 cm/sec  Ao mean P.3 mmHg  Ao V2 VTI: 18.3 cm  MICHELLE(I,D): 2.0 cm2  MICHELLE(V,D): 2.2 cm2  LV V1 max P.9 mmHg  LV V1 max: 68.9 cm/sec  LV V1 VTI: 12.0 cm  SV(LVOT): 37.2 ml  SI(LVOT): 23.7 ml/m2  PA V2 max: 65.2 cm/sec  PA max P.7 mmHg  PA acc time: 0.13 sec  PI end-d frank: 160.2 cm/sec  TR max frank: 322.8 cm/sec  TR max P.7 mmHg  AV Frank Ratio (DI): 0.70     MICHELLE Index (cm2/m2): 1.3  E/E' av.1  Lateral E/e': 32.3  Medial E/e': 35.8     _____________________________________________________________________________  __           Report approved by: Isabel Love 2018 09:49 AM      Echocardiogram Limited    Narrative    [id number]  On license of [facility]  [id number]  [id number]^MEGHAN^IVA^AMBER           [facility]  Diagnostic and [facility]reamt[facility] Floor  [address] [place] [id number]     Name: SYDNIE SIERRA  MRN: [id number]  : 1965  Study Date: 12/15/2017 10:52 AM  Age: 52 yrs  Gender: Male  Patient Location: [facility]  Reason For Study: s/p Karena Clip  Ordering Physician: IVA CHRISTIANSON  Referring Physician: IVA CHRISTIANSON  Performed By: Sandra Fields RDCS     BSA: 1.6 m2  Height: 62 in  Weight: 123 lb  BP: 100/74 mmHg  _____________________________________________________________________________  __        Procedure  Limited Echocardiogram with portions of two-dimensional, color and spectral  Doppler performed.  _____________________________________________________________________________  __        Interpretation Summary  Moderately reduced systolic function. EF 30-35%. Global hypokinesis with  akinesis of the basal-mid inferior and basal-mid inferolateral walls.  Global right ventricular function is  mildly reduced.  Status post transcatheter mitral valve repair with Mitraclip. The clip is  attached to the leaflets. There is trace to mild mitral regurgitation. Mean  gradient 5 mmHg at heart rate 97 bpm.  Pulmonary hypertension with right ventricular systolic pressure 41mmHg above  the right atrial pressure present.  No pericardial effusion.     Compared to the prior study 8/10/17 there has been no significant change.  _____________________________________________________________________________  __        Left Ventricle  Left ventricular wall thickness is normal. Mild left ventricular dilation is  present. Global hypokinesis with akinesis of the basal-mid inferior and basal-  mid inferolateral walls. Moderately reduced systolic function. EF 30-35%.  Diastolic function not assessed.     Right Ventricle  The right ventricle is normal size. Global right ventricular function is  mildly reduced. A pacemaker lead is noted in the right ventricle.     Atria  Both atria appear normal.     Mitral Valve  Status post transcatheter mitral valve repair with Mitraclip. The clip is  attached to the leaflets. There is trace to mild mitral regurgitation. Mean  gradient 5 mmHg at heart rate 97 bpm.        Aortic Valve  Aortic valve is normal in structure and function.     Tricuspid Valve  Mild tricuspid insufficiency is present. Right ventricular systolic pressure  is 41mmHg above the right atrial pressure.     Pulmonic Valve  The pulmonic valve is normal.     Vessels  The aorta root is normal. The inferior vena cava was normal in size with  preserved respiratory variability. Estimated mean right atrial pressure is 3  mmHg.     Pericardium  No pericardial effusion is present.        Attestation  I have personally viewed the imaging and agree with the interpretation and  report as documented by the fellow, David Hill, and/or edited by  me.  _____________________________________________________________________________  __  MMode/2D Measurements & Calculations     IVSd: 0.72 cm  LVIDd: 4.9 cm  LVIDs: 4.2 cm  LVPWd: 0.73 cm  FS: 14.2 %  EDV(Teich): 115.5 ml  ESV(Teich): 80.6 ml  LV mass(C)d: 118.2 grams  LV mass(C)dI: 76.1 grams/m2     EF(MOD-bp): 34.1 %  LA Volume (BP): 49.5 ml  LA Volume Index (BP): 31.9 ml/m2  RWT: 0.30           Doppler Measurements & Calculations  MV E max isabel: 126.4 cm/sec  MV A max isabel: 46.5 cm/sec  MV E/A: 2.7  MV max P.7 mmHg  MV mean P.6 mmHg  MV V2 VTI: 26.5 cm  MV dec time: 0.13 sec  TR max isabel: 321.2 cm/sec  TR max P.3 mmHg     _____________________________________________________________________________  __           Report approved by: Isabel Alex 12/15/2017 12:24 PM      ECHO COMPLETE WITH OPTISON    Narrative    [id number]  [id number]  [id number]  [id number]^MEGHAN^IVA^AMBER           [facility]  Diagnostic and Treamtent-3rd Floor  [address] [place] [address]     Name: SYDNIE SIERRA  MRN: [id number]  : 1965  Study Date: 08/10/2017 09:02 AM  Age: 52 yrs  Gender: Male  Patient Location: [facility]  Reason For Study: Nonrheumatic mitral (valve) insufficiency  Ordering Physician: IVA CHRISTIANSON  Referring Physician: IVA CHRISTIANSON  Performed By: True Newman RDCS     BSA: 1.5 m2  Height: 62 in  Weight: 118 lb  _____________________________________________________________________________  __        Procedure  Echocardiogram with two-dimensional, color and spectral Doppler performed.  _____________________________________________________________________________  __        Interpretation Summary  Moderately (EF 30-35%) reduced left ventricular function is present (bi-plane  32%). Basal lateral, inferior, and inferoseptal hypokinesis.  Global RV function mildly reduced.  S/p trans-catheter MVR 2017. Mild mitral insufficiency is present.  Mean  valve gradient 5 mmHg at a heart rate of 104 bpm.  Right ventricular systolic pressure is 40mmHg above the right atrial pressure.  The inferior vena cava is normal in size with preserved respiratory  variability.  No pericardial effusion is present.  _____________________________________________________________________________  __        Left Ventricle  Left ventricular wall thickness is normal. Left ventricular size is normal.  Diastolic function cannot be assessedBiplane traced at 32%. Moderately (EF 35-  40%) reduced left ventricular function is present. Basal lateral and inferior,  basal and mid inferoseptal, basal inferior hypokinesis.     Right Ventricle  The right ventricle is normal size. Global right ventricular function is  mildly reduced.     Atria  The right atria appears normal. Mild left atrial enlargement is present. The  atrial septum is intact as assessed by color Doppler .        Mitral Valve  S/p mitral valve repair. Mean gradient across the valve is 5 mmHg. Mild mitral  annular calcification is present. Mild mitral insufficiency is present. The  mean mitral valve gradient is 4.6 mmHg.     Aortic Valve  The aortic valve is tricuspid. Trace aortic insufficiency is present.     Tricuspid Valve  Mild tricuspid insufficiency is present. Right ventricular systolic pressure  is 40mmHg above the right atrial pressure.     Pulmonic Valve  The pulmonic valve is normal. Trace pulmonic insufficiency is present.     Vessels  The aorta root is normal. The inferior vena cava was normal in size with  preserved respiratory variability. Estimated mean right atrial pressure is 3  mmHg.     Pericardium  No pericardial effusion is present.        Compared to Previous Study  This study was compared with the study from 6/8/2017 . The RVSP is marginally  higher.     Attestation  I have personally viewed the imaging and agree with the interpretation and  report as documented by the fellow, Dmitriy Merino, and/or  edited by me.  _____________________________________________________________________________  __     MMode/2D Measurements & Calculations  IVSd: 0.73 cm  LVIDd: 5.3 cm  LVIDs: 4.5 cm  LVPWd: 0.46 cm  FS: 14.0 %  EDV(Teich): 134.6 ml  ESV(Teich): 94.8 ml  LV mass(C)d: 103.8 grams  LV mass(C)dI: 67.9 grams/m2  Ao root diam: 2.6 cm  asc Aorta Diam: 3.1 cm  LVOT diam: 1.8 cm  LVOT area: 2.6 cm2        Doppler Measurements & Calculations  MV max P.3 mmHg  MV mean P.6 mmHg  MV V2 VTI: 23.7 cm  TR max isabel: 317.6 cm/sec  TR max P.4 mmHg        _____________________________________________________________________________  __        Report approved by: Isabel Daniel 08/10/2017 03:02 PM            Assessment and Plan:    In summary, Luke is a 52 year old gentleman with ICM who continues to remain euvolemic.  I have increased his lisinopril to 5 mg in the am and 2.5 mg in the evening.  He will repeat a BMP in one week.  If his creatinine and BP continues to remain stable, will plan to increase his lisinopril to 5 mg twice daily.      1.  Chronic a heart failure secondary to ICM.  Stage C NYHA Class IIIA  ACEi/ARB:  Increase Lisinopril to 5 mg in the am and 2.5 mg in the evening.  BB: Metoprolol  mg daily.   Aldosterone antagonist: contraindicated due to transient renal dysfunction with prior attempts  SCD prophylaxis: ICD  Fluid status: euvolemic  Anticoagulation: None.     Antiplatelet:  ASA dose 81 mg daily.  NSAID use:  Contraindicated.  Avoid use.      2.  CAD:  Stable.  Continue Plavix, ASA, Metoprolol, Lisinopril, and Atorvastatin      3.  Mitral valve repair s/p mitral clip: Transthoracic echocardiogram due in December per Dr. Amaral's previous recommendations.      4.  Lower extremity edema:  Elevate legs when resting.  Compression stocking prescription mailed to patient.     5.  Follow up:  BMP in one week.  [facility] clinic in July.  Follow up with Dr. Amaral in August with repeat  SAMEERA VENEGAS, [facility]  [facility]   [phone]

## 2018-06-13 NOTE — PATIENT INSTRUCTIONS
"You were seen today in the Cardiovascular Clinic at the [facility].     Cardiology Providers you saw during your visit: Vandana VENEGAS CNP      Follow up and medication changes:  1. Increase Lisinopril to 2.5 mg in the morning, 5 mg in the evening.   2. Repeat labs in 1 week.   3. Follow up with Vandana in one month!      Results for SYDNIE SIERRA (MRN [id number]) as of 2018 12:16   Ref. Range 2018 11:30   Sodium Latest Ref Range: 133 - 144 mmol/L 138   Potassium Latest Ref Range: 3.4 - 5.3 mmol/L 4.3   Chloride Latest Ref Range: 94 - 109 mmol/L 104   Carbon Dioxide Latest Ref Range: 20 - 32 mmol/L 25   Urea Nitrogen Latest Ref Range: 7 - 30 mg/dL 26   Creatinine Latest Ref Range: 0.66 - 1.25 mg/dL 1.09   GFR Estimate Latest Ref Range: >60 mL/min/1.7m2 71   GFR Estimate If Black Latest Ref Range: >60 mL/min/1.7m2 86   Calcium Latest Ref Range: 8.5 - 10.1 mg/dL 8.6   Anion Gap Latest Ref Range: 3 - 14 mmol/L 9   Glucose Latest Ref Range: 70 - 99 mg/dL 124 (H)         Please limit your fluid intake to 2 L (68 ounces) daily.  2 Liters a day = 8.5 cups, or 68 ounces.  Please limit your salt intake to 2 grams a day or less.     If you gain 2# in 24 hours or 5# in one week call Soraya Messina RN so we can adjust your medications as needed over the phone.     Please feel free to call me with any questions or concerns.       Soraya Messina RN  [facility]  Cardiology Care Coordinator-Heart Failure Clinic     Questions and [phone].   First press #1 for the [facility] and then press #3 for \"Medical Questions\" to reach us Cardiology Nurses.      On Call Cardiologist for after hours or on weekends: [phone]   option #4 and ask to speak to the on-call Cardiologist. Inform them you are a CORE/heart failure patient at the [facility].           If you need a medication refill please contact your pharmacy.  Please allow 3 business days for your refill to be " completed.  _______________________________________________________  [facility] Cardiomyopathy, Optimization, Rehabilitation, Education   The [facility]IN[facility] is a heart failure specialty clinic within the [facility] where you will work with specialized nurse practitioners dedicated to helping patients with heart failure carefully adjust medications, receive education, and learn who and when to call if symptoms develop. They specialize in helping you better understand your condition, slow the progression of your disease, improve the length and quality of your life, help you detect future heart problems before they become life threatening, and avoid hospitalizations.  As always, thank you for trusting us with your health care needs!

## 2018-06-14 ENCOUNTER — CARE COORDINATION (OUTPATIENT)
Dept: CARDIOLOGY | Facility: CLINIC | Age: 53
End: 2018-06-14

## 2018-06-14 NOTE — PROGRESS NOTES
Called Luke and talked to his wife to let them know we will mail an order for compression stockings tomorrow.   Soraya Messina RN

## 2018-06-15 DIAGNOSIS — R60.0 BILATERAL LEG EDEMA: Primary | ICD-10-CM

## 2018-06-20 DIAGNOSIS — I25.5 ISCHEMIC CARDIOMYOPATHY: ICD-10-CM

## 2018-06-20 LAB
ANION GAP SERPL CALCULATED.3IONS-SCNC: 7 MMOL/L (ref 3–14)
BUN SERPL-MCNC: 32 MG/DL (ref 7–30)
CALCIUM SERPL-MCNC: 9.3 MG/DL (ref 8.5–10.1)
CHLORIDE SERPL-SCNC: 102 MMOL/L (ref 94–109)
CO2 SERPL-SCNC: 29 MMOL/L (ref 20–32)
CREAT SERPL-MCNC: 1.41 MG/DL (ref 0.66–1.25)
GFR SERPL CREATININE-BSD FRML MDRD: 53 ML/MIN/1.7M2
GLUCOSE SERPL-MCNC: 100 MG/DL (ref 70–99)
POTASSIUM SERPL-SCNC: 4.2 MMOL/L (ref 3.4–5.3)
SODIUM SERPL-SCNC: 138 MMOL/L (ref 133–144)

## 2018-06-20 PROCEDURE — 80048 BASIC METABOLIC PNL TOTAL CA: CPT | Performed by: INTERNAL MEDICINE

## 2018-06-20 PROCEDURE — 36415 COLL VENOUS BLD VENIPUNCTURE: CPT | Performed by: INTERNAL MEDICINE

## 2018-07-26 DIAGNOSIS — I25.5 ISCHEMIC CARDIOMYOPATHY: Primary | ICD-10-CM

## 2018-08-03 ENCOUNTER — OFFICE VISIT (OUTPATIENT)
Dept: CARDIOLOGY | Facility: CLINIC | Age: 53
End: 2018-08-03
Attending: NURSE PRACTITIONER
Payer: COMMERCIAL

## 2018-08-03 VITALS
HEART RATE: 71 BPM | BODY MASS INDEX: 23.57 KG/M2 | DIASTOLIC BLOOD PRESSURE: 89 MMHG | OXYGEN SATURATION: 99 % | WEIGHT: 128.1 LBS | SYSTOLIC BLOOD PRESSURE: 139 MMHG | HEIGHT: 62 IN

## 2018-08-03 DIAGNOSIS — I50.22 CHRONIC SYSTOLIC CONGESTIVE HEART FAILURE (H): ICD-10-CM

## 2018-08-03 DIAGNOSIS — I25.10 CORONARY ARTERY DISEASE INVOLVING NATIVE CORONARY ARTERY OF NATIVE HEART WITHOUT ANGINA PECTORIS: ICD-10-CM

## 2018-08-03 DIAGNOSIS — I25.5 ISCHEMIC CARDIOMYOPATHY: ICD-10-CM

## 2018-08-03 DIAGNOSIS — R60.0 LOWER EXTREMITY EDEMA: ICD-10-CM

## 2018-08-03 DIAGNOSIS — Z98.890 HISTORY OF MITRAL VALVE REPAIR: Primary | ICD-10-CM

## 2018-08-03 LAB
ANION GAP SERPL CALCULATED.3IONS-SCNC: 7 MMOL/L (ref 3–14)
BUN SERPL-MCNC: 26 MG/DL (ref 7–30)
CALCIUM SERPL-MCNC: 8.3 MG/DL (ref 8.5–10.1)
CHLORIDE SERPL-SCNC: 108 MMOL/L (ref 94–109)
CO2 SERPL-SCNC: 25 MMOL/L (ref 20–32)
CREAT SERPL-MCNC: 1.21 MG/DL (ref 0.66–1.25)
GFR SERPL CREATININE-BSD FRML MDRD: 63 ML/MIN/1.7M2
GLUCOSE SERPL-MCNC: 144 MG/DL (ref 70–99)
POTASSIUM SERPL-SCNC: 4.1 MMOL/L (ref 3.4–5.3)
SODIUM SERPL-SCNC: 139 MMOL/L (ref 133–144)

## 2018-08-03 PROCEDURE — 80048 BASIC METABOLIC PNL TOTAL CA: CPT | Performed by: NURSE PRACTITIONER

## 2018-08-03 PROCEDURE — T1013 SIGN LANG/ORAL INTERPRETER: HCPCS | Mod: U3,ZF

## 2018-08-03 PROCEDURE — 99214 OFFICE O/P EST MOD 30 MIN: CPT | Mod: ZP | Performed by: NURSE PRACTITIONER

## 2018-08-03 PROCEDURE — 36415 COLL VENOUS BLD VENIPUNCTURE: CPT | Performed by: NURSE PRACTITIONER

## 2018-08-03 PROCEDURE — G0463 HOSPITAL OUTPT CLINIC VISIT: HCPCS | Mod: ZF

## 2018-08-03 ASSESSMENT — PAIN SCALES - GENERAL: PAINLEVEL: NO PAIN (0)

## 2018-08-03 NOTE — LETTER
8/3/2018      RE: Luke Henao  [address]  [facility] [address]       Dear Colleague,    Thank you for the opportunity to participate in the care of your patient, Luke Henao, at the [facility] at [facility]. Please see a copy of my visit note below.      HPI: Luke is a 53 year old gentleman with a past medical history of ICM, CAD with a RCA STEMI s/p PCI x 7 to RCA, left circumflex (now ), and LAD on 3/27/17 complicated by PEDRO, sepsis, sacral ulcer, DVT, bilateral hemispheric infarcts secondary to watershed ischemia, and cardiogenic shock s/p IABP, who was later transitioned to a subclavian balloon pump and underwent mitral clip for ischemic MR.  He returns for routine follow up.      Luke is feeling well overall.  He continues to get SOB after walking a flight of stairs.  When he works 10 hours on his feet and walks to the parking lot to his car, he feels fatigued and notices more lower extremity edema. His symptoms dissipate after resting and putting his feet up.  Denies SOB at rest, PND, orthopnea, chest pain, palpitations, lightheadedness, dizziness, near syncopal/syncopal episodes.  He been following his recommended salt and fluid restrictions.  He states that when he tries to drink two liters, his abdomen feels full and he feels tired.  Therefore he has been drinking 32 ounces daily. His appetite is good. Denies early satiety.    PAST MEDICAL HISTORY:  Past Medical History:   Diagnosis Date     Acne      CAD (coronary artery disease) 2017    RCA STEMI s/p balloon angioplasty and multiple Sofie to RCA, LCx, and LAD     Cardiogenic shock (H)      DVT (deep venous thrombosis) (H) 2017    left internal jugular (line-associated); left common femoral; on coumadin     Ischemic cardiomyopathy 2017    EF 30-35%     Ischemic stroke (H) 2017    no residual deficits     Lower GI bleed 2017    due to rectal ulcer     Mitral valve insufficiency, ischemic 2017    s/p  Mitral Clip placement      Skin ulcer of sacrum (H)      Systolic heart failure (H)      Type 2 diabetes mellitus (H)        FAMILY HISTORY:  Family History   Problem Relation Age of Onset     Hypertension Mother      Type 2 Diabetes Father      Hypertension Father      Myocardial Infarction No family hx of      Cerebrovascular Disease No family hx of      Coronary Artery Disease Early Onset No family hx of      Colon Cancer No family hx of      Prostate Cancer No family hx of        SOCIAL HISTORY:  Social History     Social History     Marital status:      Spouse name: N/A     Number of children: N/A     Years of education: N/A     Occupational History     H[employer]      Social History Main Topics     Smoking status: Former Smoker     Packs/day: 0.50     Years: 30.00     Types: Cigarettes     Quit date: 1/1/2017     Smokeless tobacco: Never Used     Alcohol use No     Drug use: No     Sexual activity: Not Currently     Other Topics Concern     None     Social History Narrative    . Remarried.    4 children with first marriage.    2 grand children.    Walks daily, but no formal exercise.            CURRENT MEDICATIONS:  Current Outpatient Prescriptions   Medication Sig Dispense Refill     atorvastatin (LIPITOR) 40 MG tablet 1 tablet (40 mg) by Oral or Feeding Tube route daily 60 tablet 7     bumetanide (BUMEX) 0.5 MG tablet Take 1 tablet (0.5 mg) by mouth every other day 45 tablet 3     cetirizine (ZYRTEC) 10 MG tablet Take 10 mg by mouth daily       clopidogrel (PLAVIX) 75 MG tablet Take 1 tablet (75 mg) by mouth daily 60 tablet 11     digoxin (LANOXIN) 125 MCG tablet Take one tablet on M, W, F, Sunday. 60 tablet 5     finasteride (PROSCAR) 5 MG tablet Take 1 tablet (5 mg) by mouth daily 60 tablet 11     glipiZIDE (GLUCOTROL) 5 MG tablet Take 1 tablet (5 mg) by mouth 2 times daily (before meals) 180 tablet 3     lisinopril (PRINIVIL/ZESTRIL) 2.5 MG tablet Take 2.5 mg in the am and 5 mg in  the evening. 90 tablet 11     metFORMIN (GLUCOPHAGE-XR) 500 MG 24 hr tablet Take 1 tablet (500 mg) by mouth daily (with dinner) 180 tablet 3     metoprolol succinate (TOPROL-XL) 50 MG 24 hr tablet Take 3 tablets (150 mg) by mouth daily 180 tablet 3     pantoprazole (PROTONIX) 40 MG EC tablet Take 1 tablet (40 mg) by mouth daily 90 tablet 3     potassium chloride SA (K-DUR/KLOR-CON M) 10 MEQ CR tablet Take 1 pill every other day when you take bumex. 30 tablet 3     alcohol swab prep pads Use to swab area of injection/mary alice as directed 3 x per day (Patient not taking: Reported on 3/12/2018) 100 each 11     aspirin 81 MG chewable tablet Take 1 tablet (81 mg) by mouth daily (Patient not taking: Reported on 3/12/2018) 36 tablet 3     ASPIRIN NOT PRESCRIBED (INTENTIONAL) Please choose reason not prescribed, below (Patient not taking: Reported on 3/12/2018) 0 each 0     Benzoyl Peroxide 2.5 % CREA Externally apply topically daily (Patient not taking: Reported on 3/12/2018) 21 g 11     insulin pen needle (BD KYLEE U/F) 32G X 4 MM Use 3 daily or as directed. (Patient not taking: Reported on 3/12/2018) 100 each prn     ONETOUCH ULTRA test strip USE TO TEST BLOOD SUGAR 3 TIMES DAILY OR AS DIRECTED. (Patient not taking: Reported on 3/23/2018) 100 strip 3     Sharps Container MISC 1 each every 30 days (Patient not taking: Reported on 3/12/2018) 1 each 0     tamsulosin (FLOMAX) 0.4 MG capsule Take 1 capsule (0.4 mg) by mouth daily (Patient not taking: Reported on 3/12/2018) 60 capsule 0     tretinoin (RETIN-A) 0.05 % cream Spread a pea size amount into affected area topically at bedtime.  Use sunscreen SPF>20. (Patient not taking: Reported on 3/12/2018) 45 g 11     Warfarin Therapy Reminder 1 each continuous prn (Patient not taking: Reported on 3/12/2018)       zinc sulfate (ZINCATE) 220 (50 ZN) MG capsule Take 1 capsule (220 mg) by mouth daily (Patient not taking: Reported on 4/18/2018) 2 capsule 0       ROS:  Constitutional:  "Denies fever, chills, or diaphoresis.  +weight gain  ENT: Denies visual disturbance, hearing loss, epistaxis, vertigo.   Respiratory:  See HPI  Cardiovascular: As per HPI.   GI: Denies nausea, vomiting, diarrhea, hematemesis, melena, or hematochezia.   : Denies urinary frequency, dysuria, or hematuria.   Integument: +Acne.  Negative for bruising, rash, or pruritis.  Psychiatric: Negative for anxiety, depression, sleep disturbance, or mood changes.   Neuro: Negative for headaches, syncope, numbness or tingling.   Endocrinology: +DM, controlled.   Musculoskeletal: Negative for gout, joint stiffness, swelling, or muscle weakness    EXAM:  /89 (BP Location: Left arm, Patient Position: Chair, Cuff Size: Adult Regular)  Pulse 71  Ht 1.575 m (5' 2\")  Wt 58.1 kg (128 lb 1.6 oz)  SpO2 99%  BMI 23.43 kg/m2  General: alert, articulate, and in no acute distress.  HEENT: normocephalic, atraumatic, anicteric sclera, EOMI, normal palate, mucosa moist, dentition intact, no cyanosis.    Neck: neck supple.  No adenopathy, masses, or carotid bruits.  JVP flat.  Heart: regular rhythm, normal S1/S2, no murmur, gallop, rub.  Precordium quiet with normal PMI.     Lungs: clear, no rales, ronchi, or wheezing.  No accessory muscle use, respirations unlabored.   Abdomen: soft, non-tender, bowel sounds present, no hepatomegaly  Extremities: No pitting edema.   No cyanosis.  Extremities warm.  2+ pedal pulses.   Neurological: Alert and oriented x 3.  Normal speech and affect, no gross motor deficits  Skin:  No ecchymoses, rashes, or clubbing.    Labs:  CBC RESULTS:  Lab Results   Component Value Date    WBC 11.2 (H) 10/27/2017    RBC 4.48 10/27/2017    HGB 13.5 10/27/2017    HCT 42.2 10/27/2017    MCV 94 10/27/2017    MCH 30.1 10/27/2017    MCHC 32.0 10/27/2017    RDW 13.0 10/27/2017     10/27/2017       CMP RESULTS:  Lab Results   Component Value Date     06/20/2018    POTASSIUM 4.2 06/20/2018    CHLORIDE 102 " 2018    CO2 29 2018    ANIONGAP 7 2018     (H) 2018    BUN 32 (H) 2018    CR 1.41 (H) 2018    GFRESTIMATED 53 (L) 2018    GFRESTBLACK 64 2018    ROB 9.3 2018    BILITOTAL 0.6 2017    ALBUMIN 4.1 2017    ALKPHOS 63 2017    ALT 19 2017    AST 15 2017        INR RESULTS:  Lab Results   Component Value Date    INR 2.3 (A) 2017    INR 2.36 (H) 2017       No components found for: CK  Lab Results   Component Value Date    MAG 2.4 (H) 2017     Lab Results   Component Value Date    NTBNP 1599 (H) 2017       Most recent echocardiogram:  Recent Results (from the past 8760 hour(s))   Echocardiogram Complete    Narrative    [id number]  [id number]  [id number]  [id number]^SHANTELL^SYDNEE^           [facility]  Diagnostic and Tretent-3rd Floor  [address] [place] [address]     Name: SYDNIE SIERRA  MRN: [id number]  : 1965  Study Date: 2018 07:55 AM  Age: 52 yrs  Gender: Male  Patient Location: [facility]  Reason For Study: 1 year s/p MitraClip  Ordering Physician: SYDNEE STINSON  Referring Physician: SYDNEE STINSON  Performed By: RAE Agarwal     BSA: 1.6 m2  Height: 62 in  Weight: 126 lb  BP: 130/91 mmHg  _____________________________________________________________________________  __        Procedure  Echocardiogram with two-dimensional, color and spectral Doppler performed.  _____________________________________________________________________________  __        Interpretation Summary  The Ejection Fraction is estimated at 30-35%. Left ventricular size is normal.  Global right ventricular function is mildly reduced. The right ventricle is  normal size.  s/p MitralClip with MV mPG of 4mmHg at 90 bpm. Trace mitral insufficiency is  present.  Moderate tricuspid insufficiency is present.  This study was compared with the study from 12/15/17  .  There has been no change.  _____________________________________________________________________________  __        Left Ventricle  Left ventricular size is normal. Left ventricular wall thickness is normal.  The Ejection Fraction is estimated at 30-35%. Grade III or advanced diastolic  dysfunction. Akinesis of the basal to apical inferior, basal infero-lateral  and infero-septal segments.     Right Ventricle  The right ventricle is normal size. Global right ventricular function is  mildly reduced. A pacemaker lead is noted in the right ventricle.     Atria  Both atria appear normal. The atrial septum is intact as assessed by color  Doppler . A pacemaker lead is noted in the right atrium.     Mitral Valve  Trace mitral insufficiency is present. s/p MitralClip with MV mPG of 4mmHg at  90 bpm.        Aortic Valve  Aortic valve is normal in structure and function. The aortic valve is  tricuspid.     Tricuspid Valve  Moderate tricuspid insufficiency is present. The right ventricular systolic  pressure is approximated at 41.7 mmHg plus the right atrial pressure. Etiology  of TR is leaflet restriction by ICD lead.     Pulmonic Valve  The pulmonic valve is normal. Trace pulmonic insufficiency is present.     Vessels  The aorta root is normal. The pulmonary artery is normal. The inferior vena  cava was normal in size with preserved respiratory variability.     Pericardium  No pericardial effusion is present.        Compared to Previous Study  This study was compared with the study from 12/15/17 . There has been no  change.     Attestation  I have personally viewed the imaging and agree with the interpretation and  report as documented by the fellow, Kirby Rodriguez, and/or edited by me.  _____________________________________________________________________________  __     MMode/2D Measurements & Calculations  IVSd: 0.84 cm  LVIDd: 5.0 cm  LVIDs: 4.5 cm  LVPWd: 0.89 cm  FS: 9.6 %  LV mass(C)d: 149.1 grams  LV mass(C)dI:  94.9 grams/m2  Ao root diam: 2.6 cm  asc Aorta Diam: 3.0 cm  LVOT diam: 2.0 cm  LVOT area: 3.1 cm2     EF(MOD-bp): 26.7 %  LA Volume (BP): 42.8 ml     LA Volume Index (BP): 27.3 ml/m2  RWT: 0.36        Doppler Measurements & Calculations  MV E max frank: 129.0 cm/sec  MV A max frank: 62.4 cm/sec  MV E/A: 2.1  MV max PG: 10.9 mmHg  MV mean PG: 3.6 mmHg  MV V2 VTI: 26.1 cm  MVA(VTI): 1.4 cm2  MV dec time: 0.10 sec  Ao V2 max: 97.9 cm/sec  Ao max P.0 mmHg  Ao V2 mean: 71.0 cm/sec  Ao mean P.3 mmHg  Ao V2 VTI: 18.3 cm  MICHELLE(I,D): 2.0 cm2  MICHELLE(V,D): 2.2 cm2  LV V1 max P.9 mmHg  LV V1 max: 68.9 cm/sec  LV V1 VTI: 12.0 cm  SV(LVOT): 37.2 ml  SI(LVOT): 23.7 ml/m2  PA V2 max: 65.2 cm/sec  PA max P.7 mmHg  PA acc time: 0.13 sec  PI end-d frank: 160.2 cm/sec  TR max frank: 322.8 cm/sec  TR max P.7 mmHg  AV Frank Ratio (DI): 0.70     MICHELLE Index (cm2/m2): 1.3  E/E' av.1  Lateral E/e': 32.3  Medial E/e': 35.8     _____________________________________________________________________________  __           Report approved by: Isabel Love 2018 09:49 AM      Echocardiogram Limited    Narrative    [id number]  [facility]  [id number]  [id number]^MEGHAN^HOLDEN           [facility]  Diagnostic and Treamtent-3rd Floor  [address] [place] [address]     Name: SYDNIE SIERRA  MRN: [id number]  : 1965  Study Date: 12/15/2017 10:52 AM  Age: 52 yrs  Gender: Male  Patient [place] [facility]  Reason For Study: s/p Karena Clip  Ordering Physician: IVA CHRISTIANSON  Referring Physician: IVA CHRISTIANSON  Performed By: Sandra Fields RDCS     BSA: 1.6 m2  Height: 62 in  Weight: 123 lb  BP: 100/74 mmHg  _____________________________________________________________________________  __        Procedure  Limited Echocardiogram with portions of two-dimensional, color and spectral  Doppler  performed.  _____________________________________________________________________________  __        Interpretation Summary  Moderately reduced systolic function. EF 30-35%. Global hypokinesis with  akinesis of the basal-mid inferior and basal-mid inferolateral walls.  Global right ventricular function is mildly reduced.  Status post transcatheter mitral valve repair with Mitraclip. The clip is  attached to the leaflets. There is trace to mild mitral regurgitation. Mean  gradient 5 mmHg at heart rate 97 bpm.  Pulmonary hypertension with right ventricular systolic pressure 41mmHg above  the right atrial pressure present.  No pericardial effusion.     Compared to the prior study 8/10/17 there has been no significant change.  _____________________________________________________________________________  __        Left Ventricle  Left ventricular wall thickness is normal. Mild left ventricular dilation is  present. Global hypokinesis with akinesis of the basal-mid inferior and basal-  mid inferolateral walls. Moderately reduced systolic function. EF 30-35%.  Diastolic function not assessed.     Right Ventricle  The right ventricle is normal size. Global right ventricular function is  mildly reduced. A pacemaker lead is noted in the right ventricle.     Atria  Both atria appear normal.     Mitral Valve  Status post transcatheter mitral valve repair with Mitraclip. The clip is  attached to the leaflets. There is trace to mild mitral regurgitation. Mean  gradient 5 mmHg at heart rate 97 bpm.        Aortic Valve  Aortic valve is normal in structure and function.     Tricuspid Valve  Mild tricuspid insufficiency is present. Right ventricular systolic pressure  is 41mmHg above the right atrial pressure.     Pulmonic Valve  The pulmonic valve is normal.     Vessels  The aorta root is normal. The inferior vena cava was normal in size with  preserved respiratory variability. Estimated mean right atrial pressure is 3  mmHg.      Pericardium  No pericardial effusion is present.        Attestation  I have personally viewed the imaging and agree with the interpretation and  report as documented by the fellow, David Hill, and/or edited by me.  _____________________________________________________________________________  __  MMode/2D Measurements & Calculations     IVSd: 0.72 cm  LVIDd: 4.9 cm  LVIDs: 4.2 cm  LVPWd: 0.73 cm  FS: 14.2 %  EDV(Teich): 115.5 ml  ESV(Teich): 80.6 ml  LV mass(C)d: 118.2 grams  LV mass(C)dI: 76.1 grams/m2     EF(MOD-bp): 34.1 %  LA Volume (BP): 49.5 ml  LA Volume Index (BP): 31.9 ml/m2  RWT: 0.30           Doppler Measurements & Calculations  MV E max isabel: 126.4 cm/sec  MV A max isabel: 46.5 cm/sec  MV E/A: 2.7  MV max P.7 mmHg  MV mean P.6 mmHg  MV V2 VTI: 26.5 cm  MV dec time: 0.13 sec  TR max isabel: 321.2 cm/sec  TR max P.3 mmHg     _____________________________________________________________________________  __           Report approved by: Isabel Alex 12/15/2017 12:24 PM      ECHO COMPLETE WITH OPTISON    Narrative    [id number]  ECH73  [id number]  [id number]^MEGHAN^IVA^AMBER           [facility]  Diagnostic and Treamtent-3rd Floor  [address] [place] [address]     Name: SYDNIE SIERRA  MRN: [id number]  : 1965  Study Date: 08/10/2017 09:02 AM  Age: 52 yrs  Gender: Male  Patient Location: [facility]  Reason For Study: Nonrheumatic mitral (valve) insufficiency  Ordering Physician: IVA CHRISTIANSON  Referring Physician: IVA CHRISTIANSON  Performed By: True Newman RDCS     BSA: 1.5 m2  Height: 62 in  Weight: 118 lb  _____________________________________________________________________________  __        Procedure  Echocardiogram with two-dimensional, color and spectral Doppler performed.  _____________________________________________________________________________  __        Interpretation Summary  Moderately (EF 30-35%) reduced  left ventricular function is present (bi-plane  32%). Basal lateral, inferior, and inferoseptal hypokinesis.  Global RV function mildly reduced.  S/p trans-catheter MVR 6/2017. Mild mitral insufficiency is present. Mean  valve gradient 5 mmHg at a heart rate of 104 bpm.  Right ventricular systolic pressure is 40mmHg above the right atrial pressure.  The inferior vena cava is normal in size with preserved respiratory  variability.  No pericardial effusion is present.  _____________________________________________________________________________  __        Left Ventricle  Left ventricular wall thickness is normal. Left ventricular size is normal.  Diastolic function cannot be assessedBiplane traced at 32%. Moderately (EF 35-  40%) reduced left ventricular function is present. Basal lateral and inferior,  basal and mid inferoseptal, basal inferior hypokinesis.     Right Ventricle  The right ventricle is normal size. Global right ventricular function is  mildly reduced.     Atria  The right atria appears normal. Mild left atrial enlargement is present. The  atrial septum is intact as assessed by color Doppler .        Mitral Valve  S/p mitral valve repair. Mean gradient across the valve is 5 mmHg. Mild mitral  annular calcification is present. Mild mitral insufficiency is present. The  mean mitral valve gradient is 4.6 mmHg.     Aortic Valve  The aortic valve is tricuspid. Trace aortic insufficiency is present.     Tricuspid Valve  Mild tricuspid insufficiency is present. Right ventricular systolic pressure  is 40mmHg above the right atrial pressure.     Pulmonic Valve  The pulmonic valve is normal. Trace pulmonic insufficiency is present.     Vessels  The aorta root is normal. The inferior vena cava was normal in size with  preserved respiratory variability. Estimated mean right atrial pressure is 3  mmHg.     Pericardium  No pericardial effusion is present.        Compared to Previous Study  This study was compared  with the study from 2017 . The RVSP is marginally  higher.     Attestation  I have personally viewed the imaging and agree with the interpretation and  report as documented by the fellow, Dmitriy Merino, and/or edited by me.  _____________________________________________________________________________  __     MMode/2D Measurements & Calculations  IVSd: 0.73 cm  LVIDd: 5.3 cm  LVIDs: 4.5 cm  LVPWd: 0.46 cm  FS: 14.0 %  EDV(Teich): 134.6 ml  ESV(Teich): 94.8 ml  LV mass(C)d: 103.8 grams  LV mass(C)dI: 67.9 grams/m2  Ao root diam: 2.6 cm  asc Aorta Diam: 3.1 cm  LVOT diam: 1.8 cm  LVOT area: 2.6 cm2        Doppler Measurements & Calculations  MV max P.3 mmHg  MV mean P.6 mmHg  MV V2 VTI: 23.7 cm  TR max isabel: 317.6 cm/sec  TR max P.4 mmHg        _____________________________________________________________________________  __        Report approved by: Isabel Daniel 08/10/2017 03:02 PM            Assessment and Plan:    In summary, Luke is a 53 year old gentleman with systolic heart failure who appears to be doing well overall.  We discussed transitioning to Entresto today.  He is leaving Saturday for another vacation to [place] and would like to wait until he returns.  He will return to  see Dr. Amaral on .  I will see him the same dayto initiate Entresto.  I have made no other medication adjustments at this time.      1.  Chronic systolic heart failure secondary to ICM.  Stage D  NYHA Class IIIA  ACEi/ARB: yes, Lisinopril 2.5 mg in the am and 5 mg in the evening.   BB: yes, Metoprolol 150 mg daily.   Aldosterone antagonist: Contraindicated secondary to worsening renal function with prior attempts.    SCD prophylaxis: ICD  Fluid status: Euvolemic  Anticoagulation: None.    Antiplatelet:  ASA dose 81 mg daily.   NSAID use:  Contraindicated.  Avoid use.  Remote Monitoring:  None.    2.  CAD:  Stable.  Continue Plavix, ASA, Metoprolol, Lisinopril, and Atorvastatin      3.  Mitral valve  repair s/p mitral clip: Follow up as scheduled with Dr. Amaral in August/September as previously arranged.      4.  Lower extremity edema:  Elevate legs when resting.   Compression stockings.      5.  Follow up:  [facility] in August with device check.  Dr. Amaral as previously arranged.       Vandana VENEGAS, CNP  [facility]   [phone]

## 2018-08-03 NOTE — PATIENT INSTRUCTIONS
"You were seen today in the Cardiovascular Clinic at the [facility].     Cardiology Providers you saw during your visit: Vandana Zambrano NP     1. Follow up with Vandana Zambrano NP on .  We will switch you to Entresto at that time.    2.  Continue your current medications.  3.  You can use CeraVe cleanser for oily skin.  (not as drying)    Results for SYDNIE SIERRA (MRN [id number]) as of 8/3/2018 09:27   Ref. Range 8/3/2018 08:53   Sodium Latest Ref Range: 133 - 144 mmol/L 139   Potassium Latest Ref Range: 3.4 - 5.3 mmol/L 4.1   Chloride Latest Ref Range: 94 - 109 mmol/L 108   Carbon Dioxide Latest Ref Range: 20 - 32 mmol/L 25   Urea Nitrogen Latest Ref Range: 7 - 30 mg/dL 26   Creatinine Latest Ref Range: 0.66 - 1.25 mg/dL 1.21   GFR Estimate Latest Ref Range: >60 mL/min/1.7m2 63   GFR Estimate If Black Latest Ref Range: >60 mL/min/1.7m2 76   Calcium Latest Ref Range: 8.5 - 10.1 mg/dL 8.3 (L)   Anion Gap Latest Ref Range: 3 - 14 mmol/L 7   Glucose Latest Ref Range: 70 - 99 mg/dL 144 (H)       Please limit your fluid intake to 2 L (64 ounces) daily.  2 Liters a day = 8.5 cups, or 64 ounces.  Please limit your salt intake to 2 grams a day or less.     If you gain 2# in 24 hours or 5# in one week call Herminia Jiménez RN so we can adjust your medications as needed over the phone.     Please feel free to call me with any questions or concerns.       Herminia Jiménez RN BSN CHFN  [facility]  Cardiology Care Coordinator-Heart Failure Clinic     Questions and [phone].   First press #1 for the [facility] and then press #3 for \"Medical Questions\" to reach us Cardiology Nurses.      On Call Cardiologist for after hours or on weekends: [phone]   option #4 and ask to speak to the on-call Cardiologist. Inform them you are a CORE/heart failure patient at the [facility].           If you need a medication refill please contact your pharmacy.  Please allow 3 business " days for your refill to be completed.  _______________________________________________________  C.O.R.E. CLINIC Cardiomyopathy, Optimization, Rehabilitation, Education   The C.O.R.E. CLINIC is a heart failure specialty clinic within the [facility] where you will work with specialized nurse practitioners dedicated to helping patients with heart failure carefully adjust medications, receive education, and learn who and when to call if symptoms develop. They specialize in helping you better understand your condition, slow the progression of your disease, improve the length and quality of your life, help you detect future heart problems before they become life threatening, and avoid hospitalizations.  As always, thank you for trusting us with your health care needs!

## 2018-08-03 NOTE — MR AVS SNAPSHOT
After Visit Summary   8/3/2018    Luke Henao    MRN: [id number]           Patient Information     Date Of Birth          1965        Visit Information        Provider Department      8/3/2018 9:15 AM Osorio Cabrera; Vandana Zambrano NP  Health Heart Care        Today's Diagnoses     Chronic systolic congestive heart failure (H)          Care Instructions    You were seen today in the Cardiovascular Clinic at the [facility].     Cardiology Providers you saw during your visit: Vandana Zambrano NP     1. Follow up with Vandana Zambrano NP on .  We will switch you to Entresto at that time.    2.  Continue your current medications.  3.  You can use CeraVe cleanser for oily skin.  (not as drying)    Results for LUKE HENAO (MRN [id number]) as of 8/3/2018 09:27   Ref. Range 8/3/2018 08:53   Sodium Latest Ref Range: 133 - 144 mmol/L 139   Potassium Latest Ref Range: 3.4 - 5.3 mmol/L 4.1   Chloride Latest Ref Range: 94 - 109 mmol/L 108   Carbon Dioxide Latest Ref Range: 20 - 32 mmol/L 25   Urea Nitrogen Latest Ref Range: 7 - 30 mg/dL 26   Creatinine Latest Ref Range: 0.66 - 1.25 mg/dL 1.21   GFR Estimate Latest Ref Range: >60 mL/min/1.7m2 63   GFR Estimate If Black Latest Ref Range: >60 mL/min/1.7m2 76   Calcium Latest Ref Range: 8.5 - 10.1 mg/dL 8.3 (L)   Anion Gap Latest Ref Range: 3 - 14 mmol/L 7   Glucose Latest Ref Range: 70 - 99 mg/dL 144 (H)       Please limit your fluid intake to 2 L (64 ounces) daily.  2 Liters a day = 8.5 cups, or 64 ounces.  Please limit your salt intake to 2 grams a day or less.     If you gain 2# in 24 hours or 5# in one week call Herminia Jiménez RN so we can adjust your medications as needed over the phone.     Please feel free to call me with any questions or concerns.       Herminia Jiménez RN BSN [facility]  [facility]  Cardiology Care Coordinator-Heart Failure Clinic     Questions and [phone].   First press #1 for the  "[facility] and then press #3 for \"Medical Questions\" to reach us Cardiology Nurses.      On Call Cardiologist for after hours or on weekends: [phone]   option #4 and ask to speak to the on-call Cardiologist. Inform them you are a CORE/heart failure patient at the [facility].           If you need a medication refill please contact your pharmacy.  Please allow 3 business days for your refill to be completed.  _______________________________________________________  C.O.R.E. CLINIC Cardiomyopathy, Optimization, Rehabilitation, Education   The C.O.R.E. CLINIC is a heart failure specialty clinic within the [facility] where you will work with specialized nurse practitioners dedicated to helping patients with heart failure carefully adjust medications, receive education, and learn who and when to call if symptoms develop. They specialize in helping you better understand your condition, slow the progression of your disease, improve the length and quality of your life, help you detect future heart problems before they become life threatening, and avoid hospitalizations.  As always, thank you for trusting us with your health care needs!           Follow-ups after your visit        Additional Services     Follow-Up with CORE Clinic                 Your next 10 appointments already scheduled     Aug 27, 2018 10:00 AM CDT   Lab with [facility]    Health Lab ([facility])    [address] [place] [address]  1st Floor  [facility] [id number] [phone]            Aug 27, 2018 10:30 AM CDT   (Arrive by 10:15 AM)   Implanted Defibulator with  Cv Device 1   [facility] ([facility])    [address] [place] [address]  [address]  [facility] [id number] [phone]            Aug 27, 2018 11:00 AM CDT   (Arrive by 10:45 AM)   CORE RETURN with Vandana Zambrano NP   [facility] ([facility])    [address] [place] [address] " [address]  [address]  [facility] [address]   [phone]            Aug 29, 2018  9:30 AM CDT   (Arrive by 9:15 AM)   Implanted Defibulator with Uc Cv Device 1   [facility] ([facility])    [address] [place] [address] Se  [address] [place] [id number]   [phone]            Aug 29, 2018 10:00 AM CDT   (Arrive by 9:45 AM)   Return Visit with Amanda Amaral MD   [facility] ([facility])    [address] [place] [address]  [address] [place] [id number]   [phone]            Sep 05, 2018 10:00 AM CDT   (Arrive by 9:45 AM)   Return Visit with Amanda Amaral MD   [facility] ([facility])    [address] [place] [address]  [address] [place] [id number] [phone]              Future tests that were ordered for you today     Open Future Orders        Priority Expected Expires Ordered    Follow-Up with CORE Clinic Routine 8/29/2018 9/24/2018 8/3/2018    Basic metabolic panel Routine 8/29/2018 8/31/2018 8/3/2018            Who to contact     If you have questions or need follow up information about today's clinic visit or your schedule please contact [facility] directly at [phone].  Normal or non-critical lab and imaging results will be communicated to you by MyChart, letter or phone within 4 business days after the clinic has received the results. If you do not hear from us within 7 days, please contact the clinic through MyChart or phone. If you have a critical or abnormal lab result, we will notify you by phone as soon as possible.  Submit refill requests through [employer] or call your pharmacy and they will forward the refill request to us. Please allow 3 business days for your refill to be completed.          Additional Information About Your Visit        Care EveryWhere ID     This is your Care EveryWhere ID. This could be used by other organizations to access your [facility]  "records  [id number]        Your Vitals Were     Pulse Height Pulse Oximetry BMI (Body Mass Index)          71 1.575 m (5' 2\") 99% 23.43 kg/m2         Blood Pressure from Last 3 Encounters:   08/03/18 139/89   06/13/18 111/78   05/16/18 128/86    Weight from Last 3 Encounters:   08/03/18 58.1 kg (128 lb 1.6 oz)   06/13/18 57.2 kg (126 lb 1.6 oz)   05/16/18 58 kg (127 lb 12.8 oz)              We Performed the Following     Follow-Up with CORE Clinic        Primary Care Provider Office Phone # Fax #    Shanna Church [phone]       [address] [place] [id number]        Equal Access to Services     [facility]: Hadii linda powerso Soerikca, waaxda luqadaha, qaybta kaalmada shabbiryajeannie, karen nettles . So [facility] [phone].    ATENCIÓN: Si habla español, tiene a suarez disposición servicios gratuitos de asistencia lingüística. [facility] [phone].    We comply with applicable federal civil rights laws and [place] laws. We do not discriminate on the basis of race, color, national origin, age, disability, sex, sexual orientation, or gender identity.            Thank you!     Thank you for choosing [facility]  for your care. Our goal is always to provide you with excellent care. Hearing back from our patients is one way we can continue to improve our services. Please take a few minutes to complete the written survey that you may receive in the mail after your visit with us. Thank you!             Your Updated Medication List - Protect others around you: Learn how to safely use, store and throw away your medicines at www.disposemymeds.org.          This list is accurate as of 8/3/18  9:49 AM.  Always use your most recent med list.                   Brand Name Dispense Instructions for use Diagnosis    alcohol swab prep pads     100 each    Use to swab area of injection/mary alice as directed 3 x per day        aspirin 81 MG chewable tablet     36 tablet    Take 1 " tablet (81 mg) by mouth daily    Coronary artery disease involving native coronary artery of native heart without angina pectoris       ASPIRIN NOT PRESCRIBED    INTENTIONAL    0 each    Please choose reason not prescribed, below    Cardiogenic shock (H), Type 2 diabetes mellitus without complication, with long-term current use of insulin (H)       atorvastatin 40 MG tablet    LIPITOR    60 tablet    1 tablet (40 mg) by Oral or Feeding Tube route daily    Coronary artery disease involving native coronary artery of native heart without angina pectoris, Cardiogenic shock (H)       Benzoyl Peroxide 2.5 % Crea     21 g    Externally apply topically daily    Acne vulgaris       bumetanide 0.5 MG tablet    BUMEX    45 tablet    Take 1 tablet (0.5 mg) by mouth every other day    Ischemic cardiomyopathy       cetirizine 10 MG tablet    zyrTEC     Take 10 mg by mouth daily        clopidogrel 75 MG tablet    PLAVIX    60 tablet    Take 1 tablet (75 mg) by mouth daily    ST elevation myocardial infarction (STEMI), unspecified artery (H)       digoxin 125 MCG tablet    LANOXIN    60 tablet    Take one tablet on M, W, F, Sunday.    Coronary artery disease involving native coronary artery of native heart without angina pectoris       finasteride 5 MG tablet    PROSCAR    60 tablet    Take 1 tablet (5 mg) by mouth daily    Hypertrophy of prostate with urinary obstruction       glipiZIDE 5 MG tablet    GLUCOTROL    180 tablet    Take 1 tablet (5 mg) by mouth 2 times daily (before meals)    Type 2 diabetes mellitus without complication, without long-term current use of insulin (H)       insulin pen needle 32G X 4 MM    BD KYLEE U/F    100 each    Use 3 daily or as directed.    Diabetes mellitus type 2, insulin dependent (H)       lisinopril 2.5 MG tablet    PRINIVIL/Zestril    90 tablet    Take 2.5 mg in the am and 5 mg in the evening.    Ischemic cardiomyopathy       metFORMIN 500 MG 24 hr tablet    GLUCOPHAGE-XR    180 tablet     Take 1 tablet (500 mg) by mouth daily (with dinner)    Type 2 diabetes mellitus without complication, without long-term current use of insulin (H)       metoprolol succinate 50 MG 24 hr tablet    TOPROL-XL    180 tablet    Take 3 tablets (150 mg) by mouth daily    Chronic systolic congestive heart failure (H)       ONETOUCH ULTRA test strip   Generic drug:  blood glucose monitoring     100 strip    USE TO TEST BLOOD SUGAR 3 TIMES DAILY OR AS DIRECTED.    Diabetes mellitus type 2, insulin dependent (H)       pantoprazole 40 MG EC tablet    PROTONIX    90 tablet    Take 1 tablet (40 mg) by mouth daily    Gastroesophageal reflux disease, esophagitis presence not specified       potassium chloride SA 10 MEQ CR tablet    K-DUR/KLOR-CON M    30 tablet    Take 1 pill every other day when you take bumex.    Chronic systolic congestive heart failure (H)       Sharps Container Misc     1 each    1 each every 30 days    Diabetes mellitus type 2, insulin dependent (H)       tamsulosin 0.4 MG capsule    FLOMAX    60 capsule    Take 1 capsule (0.4 mg) by mouth daily    Ischemic cardiomyopathy       tretinoin 0.05 % cream    RETIN-A    45 g    Spread a pea size amount into affected area topically at bedtime.  Use sunscreen SPF>20.    Acne vulgaris       Warfarin Therapy Reminder      1 each continuous prn    Deep vein thrombosis (DVT) of left lower extremity, unspecified chronicity, unspecified vein (H)       zinc sulfate 220 (50 Zn) MG capsule    ZINCATE    2 capsule    Take 1 capsule (220 mg) by mouth daily    Ischemic cardiomyopathy

## 2018-08-03 NOTE — PROGRESS NOTES
HPI: Luke is a 53 year old gentleman with a past medical history of ICM, CAD with a RCA STEMI s/p PCI x 7 to RCA, left circumflex (now ), and LAD on 3/27/17 complicated by PEDRO, sepsis, sacral ulcer, DVT, bilateral hemispheric infarcts secondary to watershed ischemia, and cardiogenic shock s/p IABP, who was later transitioned to a subclavian balloon pump and underwent mitral clip for ischemic MR.  He returns for routine follow up.      Luke is feeling well overall.  He continues to get SOB after walking a flight of stairs.  When he works 10 hours on his feet and walks to the parking lot to his car, he feels fatigued and notices more lower extremity edema. His symptoms dissipate after resting and putting his feet up.  Denies SOB at rest, PND, orthopnea, chest pain, palpitations, lightheadedness, dizziness, near syncopal/syncopal episodes.  He been following his recommended salt and fluid restrictions.  He states that when he tries to drink two liters, his abdomen feels full and he feels tired.  Therefore he has been drinking 32 ounces daily. His appetite is good. Denies early satiety.    PAST MEDICAL HISTORY:  Past Medical History:   Diagnosis Date     Acne      CAD (coronary artery disease) 2017    RCA STEMI s/p balloon angioplasty and multiple Sofie to RCA, LCx, and LAD     Cardiogenic shock (H)      DVT (deep venous thrombosis) (H) 2017    left internal jugular (line-associated); left common femoral; on coumadin     Ischemic cardiomyopathy 2017    EF 30-35%     Ischemic stroke (H) 2017    no residual deficits     Lower GI bleed 2017    due to rectal ulcer     Mitral valve insufficiency, ischemic 2017    s/p Mitral Clip placement      Skin ulcer of sacrum (H)      Systolic heart failure (H)      Type 2 diabetes mellitus (H)        FAMILY HISTORY:  Family History   Problem Relation Age of Onset     Hypertension Mother      Type 2 Diabetes Father      Hypertension Father      Myocardial Infarction No family hx of       Cerebrovascular Disease No family hx of      Coronary Artery Disease Early Onset No family hx of      Colon Cancer No family hx of      Prostate Cancer No family hx of        SOCIAL HISTORY:  Social History     Social History     Marital status:      Spouse name: N/A     Number of children: N/A     Years of education: N/A     Occupational History     H[employer]      Social History Main Topics     Smoking status: Former Smoker     Packs/day: 0.50     Years: 30.00     Types: Cigarettes     Quit date: 1/1/2017     Smokeless tobacco: Never Used     Alcohol use No     Drug use: No     Sexual activity: Not Currently     Other Topics Concern     None     Social History Narrative    . Remarried.    4 children with first marriage.    2 grand children.    Walks daily, but no formal exercise.            CURRENT MEDICATIONS:  Current Outpatient Prescriptions   Medication Sig Dispense Refill     atorvastatin (LIPITOR) 40 MG tablet 1 tablet (40 mg) by Oral or Feeding Tube route daily 60 tablet 7     bumetanide (BUMEX) 0.5 MG tablet Take 1 tablet (0.5 mg) by mouth every other day 45 tablet 3     cetirizine (ZYRTEC) 10 MG tablet Take 10 mg by mouth daily       clopidogrel (PLAVIX) 75 MG tablet Take 1 tablet (75 mg) by mouth daily 60 tablet 11     digoxin (LANOXIN) 125 MCG tablet Take one tablet on M, W, F, Sunday. 60 tablet 5     finasteride (PROSCAR) 5 MG tablet Take 1 tablet (5 mg) by mouth daily 60 tablet 11     glipiZIDE (GLUCOTROL) 5 MG tablet Take 1 tablet (5 mg) by mouth 2 times daily (before meals) 180 tablet 3     lisinopril (PRINIVIL/ZESTRIL) 2.5 MG tablet Take 2.5 mg in the am and 5 mg in the evening. 90 tablet 11     metFORMIN (GLUCOPHAGE-XR) 500 MG 24 hr tablet Take 1 tablet (500 mg) by mouth daily (with dinner) 180 tablet 3     metoprolol succinate (TOPROL-XL) 50 MG 24 hr tablet Take 3 tablets (150 mg) by mouth daily 180 tablet 3     pantoprazole (PROTONIX) 40 MG EC tablet Take 1 tablet  (40 mg) by mouth daily 90 tablet 3     potassium chloride SA (K-DUR/KLOR-CON M) 10 MEQ CR tablet Take 1 pill every other day when you take bumex. 30 tablet 3     alcohol swab prep pads Use to swab area of injection/mary alice as directed 3 x per day (Patient not taking: Reported on 3/12/2018) 100 each 11     aspirin 81 MG chewable tablet Take 1 tablet (81 mg) by mouth daily (Patient not taking: Reported on 3/12/2018) 36 tablet 3     ASPIRIN NOT PRESCRIBED (INTENTIONAL) Please choose reason not prescribed, below (Patient not taking: Reported on 3/12/2018) 0 each 0     Benzoyl Peroxide 2.5 % CREA Externally apply topically daily (Patient not taking: Reported on 3/12/2018) 21 g 11     insulin pen needle (BD KYLEE U/F) 32G X 4 MM Use 3 daily or as directed. (Patient not taking: Reported on 3/12/2018) 100 each prn     ONETOUCH ULTRA test strip USE TO TEST BLOOD SUGAR 3 TIMES DAILY OR AS DIRECTED. (Patient not taking: Reported on 3/23/2018) 100 strip 3     Sharps Container MISC 1 each every 30 days (Patient not taking: Reported on 3/12/2018) 1 each 0     tamsulosin (FLOMAX) 0.4 MG capsule Take 1 capsule (0.4 mg) by mouth daily (Patient not taking: Reported on 3/12/2018) 60 capsule 0     tretinoin (RETIN-A) 0.05 % cream Spread a pea size amount into affected area topically at bedtime.  Use sunscreen SPF>20. (Patient not taking: Reported on 3/12/2018) 45 g 11     Warfarin Therapy Reminder 1 each continuous prn (Patient not taking: Reported on 3/12/2018)       zinc sulfate (ZINCATE) 220 (50 ZN) MG capsule Take 1 capsule (220 mg) by mouth daily (Patient not taking: Reported on 4/18/2018) 2 capsule 0       ROS:  Constitutional: Denies fever, chills, or diaphoresis.  +weight gain  ENT: Denies visual disturbance, hearing loss, epistaxis, vertigo.   Respiratory:  See HPI  Cardiovascular: As per HPI.   GI: Denies nausea, vomiting, diarrhea, hematemesis, melena, or hematochezia.   : Denies urinary frequency, dysuria, or hematuria.  "  Integument: +Acne.  Negative for bruising, rash, or pruritis.  Psychiatric: Negative for anxiety, depression, sleep disturbance, or mood changes.   Neuro: Negative for headaches, syncope, numbness or tingling.   Endocrinology: +DM, controlled.   Musculoskeletal: Negative for gout, joint stiffness, swelling, or muscle weakness    EXAM:  /89 (BP Location: Left arm, Patient Position: Chair, Cuff Size: Adult Regular)  Pulse 71  Ht 1.575 m (5' 2\")  Wt 58.1 kg (128 lb 1.6 oz)  SpO2 99%  BMI 23.43 kg/m2  General: alert, articulate, and in no acute distress.  HEENT: normocephalic, atraumatic, anicteric sclera, EOMI, normal palate, mucosa moist, dentition intact, no cyanosis.    Neck: neck supple.  No adenopathy, masses, or carotid bruits.  JVP flat.  Heart: regular rhythm, normal S1/S2, no murmur, gallop, rub.  Precordium quiet with normal PMI.     Lungs: clear, no rales, ronchi, or wheezing.  No accessory muscle use, respirations unlabored.   Abdomen: soft, non-tender, bowel sounds present, no hepatomegaly  Extremities: No pitting edema.   No cyanosis.  Extremities warm.  2+ pedal pulses.   Neurological: Alert and oriented x 3.  Normal speech and affect, no gross motor deficits  Skin:  No ecchymoses, rashes, or clubbing.    Labs:  CBC RESULTS:  Lab Results   Component Value Date    WBC 11.2 (H) 10/27/2017    RBC 4.48 10/27/2017    HGB 13.5 10/27/2017    HCT 42.2 10/27/2017    MCV 94 10/27/2017    MCH 30.1 10/27/2017    MCHC 32.0 10/27/2017    RDW 13.0 10/27/2017     10/27/2017       CMP RESULTS:  Lab Results   Component Value Date     06/20/2018    POTASSIUM 4.2 06/20/2018    CHLORIDE 102 06/20/2018    CO2 29 06/20/2018    ANIONGAP 7 06/20/2018     (H) 06/20/2018    BUN 32 (H) 06/20/2018    CR 1.41 (H) 06/20/2018    GFRESTIMATED 53 (L) 06/20/2018    GFRESTBLACK 64 06/20/2018    ROB 9.3 06/20/2018    BILITOTAL 0.6 09/26/2017    ALBUMIN 4.1 09/26/2017    ALKPHOS 63 09/26/2017    ALT 19 " 2017    AST 15 2017        INR RESULTS:  Lab Results   Component Value Date    INR 2.3 (A) 2017    INR 2.36 (H) 2017       No components found for: CK  Lab Results   Component Value Date    MAG 2.4 (H) 2017     Lab Results   Component Value Date    NTBNP 1599 (H) 2017       Most recent echocardiogram:  Recent Results (from the past 8760 hour(s))   Echocardiogram Complete    Narrative    [id number]  ECH19  [id number]  [id number]^SHANTELL^SYDNEE^           [facility]  Diagnostic and Treamtent-3rd Floor  [address] [place] [address]     Name: SYDNIE SIERRA  MRN: [id number]  : 1965  Study Date: 2018 07:55 AM  Age: 52 yrs  Gender: Male  Patient Location: [facility] [place]  Reason For Study: 1 year s/p MitraClip  Ordering Physician: SYDNEE STINSON  Referring Physician: SYDNEE STINSON  Performed By: RAE Agarwal     BSA: 1.6 m2  Height: 62 in  Weight: 126 lb  BP: 130/91 mmHg  _____________________________________________________________________________  __        Procedure  Echocardiogram with two-dimensional, color and spectral Doppler performed.  _____________________________________________________________________________  __        Interpretation Summary  The Ejection Fraction is estimated at 30-35%. Left ventricular size is normal.  Global right ventricular function is mildly reduced. The right ventricle is  normal size.  s/p MitralClip with MV mPG of 4mmHg at 90 bpm. Trace mitral insufficiency is  present.  Moderate tricuspid insufficiency is present.  This study was compared with the study from 12/15/17 .  There has been no change.  _____________________________________________________________________________  __        Left Ventricle  Left ventricular size is normal. Left ventricular wall thickness is normal.  The Ejection Fraction is estimated at 30-35%. Grade III or advanced diastolic  dysfunction. Akinesis of  the basal to apical inferior, basal infero-lateral  and infero-septal segments.     Right Ventricle  The right ventricle is normal size. Global right ventricular function is  mildly reduced. A pacemaker lead is noted in the right ventricle.     Atria  Both atria appear normal. The atrial septum is intact as assessed by color  Doppler . A pacemaker lead is noted in the right atrium.     Mitral Valve  Trace mitral insufficiency is present. s/p MitralClip with MV mPG of 4mmHg at  90 bpm.        Aortic Valve  Aortic valve is normal in structure and function. The aortic valve is  tricuspid.     Tricuspid Valve  Moderate tricuspid insufficiency is present. The right ventricular systolic  pressure is approximated at 41.7 mmHg plus the right atrial pressure. Etiology  of TR is leaflet restriction by ICD lead.     Pulmonic Valve  The pulmonic valve is normal. Trace pulmonic insufficiency is present.     Vessels  The aorta root is normal. The pulmonary artery is normal. The inferior vena  cava was normal in size with preserved respiratory variability.     Pericardium  No pericardial effusion is present.        Compared to Previous Study  This study was compared with the study from 12/15/17 . There has been no  change.     Attestation  I have personally viewed the imaging and agree with the interpretation and  report as documented by the fellow, Kirby Rodriguez, and/or edited by me.  _____________________________________________________________________________  __     MMode/2D Measurements & Calculations  IVSd: 0.84 cm  LVIDd: 5.0 cm  LVIDs: 4.5 cm  LVPWd: 0.89 cm  FS: 9.6 %  LV mass(C)d: 149.1 grams  LV mass(C)dI: 94.9 grams/m2  Ao root diam: 2.6 cm  asc Aorta Diam: 3.0 cm  LVOT diam: 2.0 cm  LVOT area: 3.1 cm2     EF(MOD-bp): 26.7 %  LA Volume (BP): 42.8 ml     LA Volume Index (BP): 27.3 ml/m2  RWT: 0.36        Doppler Measurements & Calculations  MV E max isabel: 129.0 cm/sec  MV A max isabel: 62.4 cm/sec  MV E/A: 2.1  MV max PG:  10.9 mmHg  MV mean PG: 3.6 mmHg  MV V2 VTI: 26.1 cm  MVA(VTI): 1.4 cm2  MV dec time: 0.10 sec  Ao V2 max: 97.9 cm/sec  Ao max P.0 mmHg  Ao V2 mean: 71.0 cm/sec  Ao mean P.3 mmHg  Ao V2 VTI: 18.3 cm  MICHELLE(I,D): 2.0 cm2  MICHELLE(V,D): 2.2 cm2  LV V1 max P.9 mmHg  LV V1 max: 68.9 cm/sec  LV V1 VTI: 12.0 cm  SV(LVOT): 37.2 ml  SI(LVOT): 23.7 ml/m2  PA V2 max: 65.2 cm/sec  PA max P.7 mmHg  PA acc time: 0.13 sec  PI end-d frank: 160.2 cm/sec  TR max frank: 322.8 cm/sec  TR max P.7 mmHg  AV Frank Ratio (DI): 0.70     MICHELLE Index (cm2/m2): 1.3  E/E' av.1  Lateral E/e': 32.3  Medial E/e': 35.8     _____________________________________________________________________________  __           Report approved by: Isabel Love 2018 09:49 AM      Echocardiogram Limited    Narrative    [id number]  [facility]  [id number]  [id number]^MEGHAN^IVA^AMBER           [facility]  [address] [place] [address]     Name: SYDNIE SIERRA  MRN: [id number]  : 1965  Study Date: 12/15/2017 10:52 AM  Age: 52 yrs  Gender: Male  [place] [facility] [place] For Study: s/p Karena Clip  Ordering Physician: IVA CHRISTIANSON  Referring Physician: IVA CHRISTIANSON  Performed By: Sandra Fields RDCS     BSA: 1.6 m2  Height: 62 in  Weight: 123 lb  BP: 100/74 mmHg  _____________________________________________________________________________  __        Procedure  Limited Echocardiogram with portions of two-dimensional, color and spectral  Doppler performed.  _____________________________________________________________________________  __        Interpretation Summary  Moderately reduced systolic function. EF 30-35%. Global hypokinesis with  akinesis of the basal-mid inferior and basal-mid inferolateral walls.  Global right ventricular function is mildly reduced.  Status post transcatheter mitral valve repair with Mitraclip. The clip  is  attached to the leaflets. There is trace to mild mitral regurgitation. Mean  gradient 5 mmHg at heart rate 97 bpm.  Pulmonary hypertension with right ventricular systolic pressure 41mmHg above  the right atrial pressure present.  No pericardial effusion.     Compared to the prior study 8/10/17 there has been no significant change.  _____________________________________________________________________________  __        Left Ventricle  Left ventricular wall thickness is normal. Mild left ventricular dilation is  present. Global hypokinesis with akinesis of the basal-mid inferior and basal-  mid inferolateral walls. Moderately reduced systolic function. EF 30-35%.  Diastolic function not assessed.     Right Ventricle  The right ventricle is normal size. Global right ventricular function is  mildly reduced. A pacemaker lead is noted in the right ventricle.     Atria  Both atria appear normal.     Mitral Valve  Status post transcatheter mitral valve repair with Mitraclip. The clip is  attached to the leaflets. There is trace to mild mitral regurgitation. Mean  gradient 5 mmHg at heart rate 97 bpm.        Aortic Valve  Aortic valve is normal in structure and function.     Tricuspid Valve  Mild tricuspid insufficiency is present. Right ventricular systolic pressure  is 41mmHg above the right atrial pressure.     Pulmonic Valve  The pulmonic valve is normal.     Vessels  The aorta root is normal. The inferior vena cava was normal in size with  preserved respiratory variability. Estimated mean right atrial pressure is 3  mmHg.     Pericardium  No pericardial effusion is present.        Attestation  I have personally viewed the imaging and agree with the interpretation and  report as documented by the fellow, David Hill, and/or edited by me.  _____________________________________________________________________________  __  MMode/2D Measurements & Calculations     IVSd: 0.72 cm  LVIDd: 4.9 cm  LVIDs: 4.2 cm  LVPWd:  0.73 cm  FS: 14.2 %  EDV(Teich): 115.5 ml  ESV(Teich): 80.6 ml  LV mass(C)d: 118.2 grams  LV mass(C)dI: 76.1 grams/m2     EF(MOD-bp): 34.1 %  LA Volume (BP): 49.5 ml  LA Volume Index (BP): 31.9 ml/m2  RWT: 0.30           Doppler Measurements & Calculations  MV E max isabel: 126.4 cm/sec  MV A max isabel: 46.5 cm/sec  MV E/A: 2.7  MV max P.7 mmHg  MV mean P.6 mmHg  MV V2 VTI: 26.5 cm  MV dec time: 0.13 sec  TR max isabel: 321.2 cm/sec  TR max P.3 mmHg     _____________________________________________________________________________  __           Report approved by: Isabel Alex 12/15/2017 12:24 PM      ECHO COMPLETE WITH OPTISON    Narrative    [id number]  Replaced by [facility]  [id number]  [id number]^MEGHAN^IVA^AMBER           [facility]  Diagnostic and Treamtent-3rd Floor  [address] [place] [id number]     Name: SYDNIE SIERRA  MRN: [id number]  : 1965  Study Date: 08/10/2017 09:02 AM  Age: 52 yrs  Gender: Male  [place] [facility] [place] For Study: Nonrheumatic mitral (valve) insufficiency  Ordering Physician: IVA CHRISTIANSON  Referring Physician: IVA CHRISTIANSON  Performed By: True Newman RDCS     BSA: 1.5 m2  Height: 62 in  Weight: 118 lb  _____________________________________________________________________________  __        Procedure  Echocardiogram with two-dimensional, color and spectral Doppler performed.  _____________________________________________________________________________  __        Interpretation Summary  Moderately (EF 30-35%) reduced left ventricular function is present (bi-plane  32%). Basal lateral, inferior, and inferoseptal hypokinesis.  Global RV function mildly reduced.  S/p trans-catheter MVR 2017. Mild mitral insufficiency is present. Mean  valve gradient 5 mmHg at a heart rate of 104 bpm.  Right ventricular systolic pressure is 40mmHg above the right atrial pressure.  The inferior vena cava is normal in size with  preserved respiratory  variability.  No pericardial effusion is present.  _____________________________________________________________________________  __        Left Ventricle  Left ventricular wall thickness is normal. Left ventricular size is normal.  Diastolic function cannot be assessedBiplane traced at 32%. Moderately (EF 35-  40%) reduced left ventricular function is present. Basal lateral and inferior,  basal and mid inferoseptal, basal inferior hypokinesis.     Right Ventricle  The right ventricle is normal size. Global right ventricular function is  mildly reduced.     Atria  The right atria appears normal. Mild left atrial enlargement is present. The  atrial septum is intact as assessed by color Doppler .        Mitral Valve  S/p mitral valve repair. Mean gradient across the valve is 5 mmHg. Mild mitral  annular calcification is present. Mild mitral insufficiency is present. The  mean mitral valve gradient is 4.6 mmHg.     Aortic Valve  The aortic valve is tricuspid. Trace aortic insufficiency is present.     Tricuspid Valve  Mild tricuspid insufficiency is present. Right ventricular systolic pressure  is 40mmHg above the right atrial pressure.     Pulmonic Valve  The pulmonic valve is normal. Trace pulmonic insufficiency is present.     Vessels  The aorta root is normal. The inferior vena cava was normal in size with  preserved respiratory variability. Estimated mean right atrial pressure is 3  mmHg.     Pericardium  No pericardial effusion is present.        Compared to Previous Study  This study was compared with the study from 6/8/2017 . The RVSP is marginally  higher.     Attestation  I have personally viewed the imaging and agree with the interpretation and  report as documented by the fellow, Dmitriy Merino, and/or edited by me.  _____________________________________________________________________________  __     MMode/2D Measurements & Calculations  IVSd: 0.73 cm  LVIDd: 5.3 cm  LVIDs: 4.5  cm  LVPWd: 0.46 cm  FS: 14.0 %  EDV(Teich): 134.6 ml  ESV(Teich): 94.8 ml  LV mass(C)d: 103.8 grams  LV mass(C)dI: 67.9 grams/m2  Ao root diam: 2.6 cm  asc Aorta Diam: 3.1 cm  LVOT diam: 1.8 cm  LVOT area: 2.6 cm2        Doppler Measurements & Calculations  MV max P.3 mmHg  MV mean P.6 mmHg  MV V2 VTI: 23.7 cm  TR max isabel: 317.6 cm/sec  TR max P.4 mmHg        _____________________________________________________________________________  __        Report approved by: Isabel Daniel 08/10/2017 03:02 PM            Assessment and Plan:    In summary, Luke is a 53 year old gentleman with systolic heart failure who appears to be doing well overall.  We discussed transitioning to Entresto today.  He is leaving Saturday for another vacation to [place] and would like to wait until he returns.  He will return to  see Dr. Amaral on .  I will see him the same dayto initiate Entresto.  I have made no other medication adjustments at this time.      1.  Chronic systolic heart failure secondary to ICM.  Stage D  NYHA Class IIIA  ACEi/ARB: yes, Lisinopril 2.5 mg in the am and 5 mg in the evening.   BB: yes, Metoprolol 150 mg daily.   Aldosterone antagonist: Contraindicated secondary to worsening renal function with prior attempts.    SCD prophylaxis: ICD  Fluid status: Euvolemic  Anticoagulation: None.    Antiplatelet:  ASA dose 81 mg daily.   NSAID use:  Contraindicated.  Avoid use.  Remote Monitoring:  None.    2.  CAD:  Stable.  Continue Plavix, ASA, Metoprolol, Lisinopril, and Atorvastatin      3.  Mitral valve repair s/p mitral clip: Follow up as scheduled with Dr. Amaral in  as previously arranged.      4.  Lower extremity edema:  Elevate legs when resting.  Compression stockings.      5.  Follow up:  CORE clinic in August with device check.  Dr. Amaral as previously arranged.       Vandana VENEGAS, [facility]  [facility] Clinic   [phone]

## 2018-08-03 NOTE — NURSING NOTE
Chief Complaint   Patient presents with     Follow Up For     CORE with labs prior     Vitals were taken and medications were reconciled.    Brit Sylvester RMA  9:16 AM

## 2018-09-05 ENCOUNTER — OFFICE VISIT (OUTPATIENT)
Dept: CARDIOLOGY | Facility: CLINIC | Age: 53
End: 2018-09-05
Attending: INTERNAL MEDICINE
Payer: COMMERCIAL

## 2018-09-05 VITALS
BODY MASS INDEX: 24.11 KG/M2 | HEIGHT: 62 IN | HEART RATE: 74 BPM | WEIGHT: 131 LBS | DIASTOLIC BLOOD PRESSURE: 90 MMHG | SYSTOLIC BLOOD PRESSURE: 136 MMHG | OXYGEN SATURATION: 100 %

## 2018-09-05 DIAGNOSIS — E78.5 HYPERLIPIDEMIA LDL GOAL <70: ICD-10-CM

## 2018-09-05 DIAGNOSIS — I63.30 CEREBROVASCULAR ACCIDENT (CVA) DUE TO THROMBOSIS OF CEREBRAL ARTERY (H): ICD-10-CM

## 2018-09-05 DIAGNOSIS — Z98.890 HISTORY OF MITRAL VALVE REPAIR: ICD-10-CM

## 2018-09-05 DIAGNOSIS — Z95.810 ICD (IMPLANTABLE CARDIOVERTER-DEFIBRILLATOR) IN PLACE: ICD-10-CM

## 2018-09-05 DIAGNOSIS — I25.10 CORONARY ARTERY DISEASE INVOLVING NATIVE CORONARY ARTERY OF NATIVE HEART WITHOUT ANGINA PECTORIS: ICD-10-CM

## 2018-09-05 DIAGNOSIS — Z95.5 S/P CORONARY ARTERY STENT PLACEMENT: ICD-10-CM

## 2018-09-05 DIAGNOSIS — I34.0 NON-RHEUMATIC MITRAL REGURGITATION: ICD-10-CM

## 2018-09-05 DIAGNOSIS — I25.5 ISCHEMIC CARDIOMYOPATHY: Primary | ICD-10-CM

## 2018-09-05 PROCEDURE — T1013 SIGN LANG/ORAL INTERPRETER: HCPCS | Mod: U3,ZF

## 2018-09-05 PROCEDURE — G0463 HOSPITAL OUTPT CLINIC VISIT: HCPCS

## 2018-09-05 PROCEDURE — 93005 ELECTROCARDIOGRAM TRACING: CPT | Mod: ZF

## 2018-09-05 PROCEDURE — 99214 OFFICE O/P EST MOD 30 MIN: CPT | Mod: ZP | Performed by: INTERNAL MEDICINE

## 2018-09-05 PROCEDURE — 93010 ELECTROCARDIOGRAM REPORT: CPT | Mod: ZP | Performed by: INTERNAL MEDICINE

## 2018-09-05 RX ORDER — ASPIRIN 81 MG/1
81 TABLET, CHEWABLE ORAL DAILY
Qty: 90 TABLET | Refills: 3 | Status: SHIPPED | OUTPATIENT
Start: 2018-09-05 | End: 2019-08-07

## 2018-09-05 ASSESSMENT — PAIN SCALES - GENERAL: PAINLEVEL: NO PAIN (0)

## 2018-09-05 NOTE — NURSING NOTE
Chief Complaint   Patient presents with     Follow Up For     54 yo male present for 6 month follow up      Medications reviewed and vitals performed.  Danelle Busch, CMA

## 2018-09-05 NOTE — LETTER
9/5/2018      RE: Luke Henao  [address]  [facility] [address]       Dear Colleague,    Thank you for the opportunity to participate in the care of your patient, Luke Henao, at the [facility]ART [facility]. Please see a copy of my visit note below.    CARDIOLOGY CLINIC FOLLOW UP    HPI: Luke Henao is a 53 year old male, being seen today for follow-up of ischemic cardiomyopathy and mitral valve disease, status post mitral clip and ICD placement.  Follows in [facility].  Last visit with me on August 28, 2018 and then considerations were made for follow-up echocardiogram.  At the time of his visit he was taking lisinopril 2.5 mg daily and metoprolol succinate 50 mg daily.  Since then these both have been uptitrated to lisinopril 2.5 mg in the morning and 5 mg in the evening and metoprolol succinate is now at 150 mg daily.  During the last visit on August 3rd he was considered for Entresto.     used for translation: Only complaint for pain to feet/legs as before. Now 2 years with leg pain. Denies CP/SOB/LOC. No bleeding. Not on aspirin, told he did not need it since on warfarin/Plavix.No limitations in walking stairs/street.Off Plavix for a while and willing to consider aspirin. Continues to work.     PAST MEDICAL HISTORY:  Past Medical History:   Diagnosis Date     Acne      CAD (coronary artery disease) 2017    RCA STEMI s/p balloon angioplasty and multiple Sofie to RCA, LCx, and LAD     Cardiogenic shock (H)      DVT (deep venous thrombosis) (H) 2017    left internal jugular (line-associated); left common femoral; on coumadin     Ischemic cardiomyopathy 2017    EF 30-35%     Ischemic stroke (H) 2017    no residual deficits     Lower GI bleed 2017    due to rectal ulcer     Mitral valve insufficiency, ischemic 2017    s/p Mitral Clip placement      Skin ulcer of sacrum (H)      Systolic heart failure (H)      Type 2 diabetes mellitus (H)       Or  CURRENT MEDICATIONS:  Current Outpatient Prescriptions   Medication Sig Dispense Refill     atorvastatin (LIPITOR) 40 MG tablet 1 tablet (40 mg) by Oral or Feeding Tube route daily 60 tablet 7     bumetanide (BUMEX) 0.5 MG tablet Take 1 tablet (0.5 mg) by mouth every other day 45 tablet 3     cetirizine (ZYRTEC) 10 MG tablet Take 10 mg by mouth daily       clopidogrel (PLAVIX) 75 MG tablet Take 1 tablet (75 mg) by mouth daily 60 tablet 11     digoxin (LANOXIN) 125 MCG tablet Take one tablet on M, W, F, Sunday. 60 tablet 5     finasteride (PROSCAR) 5 MG tablet Take 1 tablet (5 mg) by mouth daily 60 tablet 11     glipiZIDE (GLUCOTROL) 5 MG tablet Take 1 tablet (5 mg) by mouth 2 times daily (before meals) 180 tablet 3     lisinopril (PRINIVIL/ZESTRIL) 2.5 MG tablet Take 2.5 mg in the am and 5 mg in the evening. 90 tablet 11     metFORMIN (GLUCOPHAGE-XR) 500 MG 24 hr tablet Take 1 tablet (500 mg) by mouth daily (with dinner) 180 tablet 3     metoprolol succinate (TOPROL-XL) 50 MG 24 hr tablet Take 3 tablets (150 mg) by mouth daily 180 tablet 3     pantoprazole (PROTONIX) 40 MG EC tablet Take 1 tablet (40 mg) by mouth daily 90 tablet 3     potassium chloride SA (K-DUR/KLOR-CON M) 10 MEQ CR tablet Take 1 pill every other day when you take bumex. 30 tablet 3     alcohol swab prep pads Use to swab area of injection/mary alice as directed 3 x per day (Patient not taking: Reported on 3/12/2018) 100 each 11     aspirin 81 MG chewable tablet Take 1 tablet (81 mg) by mouth daily (Patient not taking: Reported on 3/12/2018) 36 tablet 3     ASPIRIN NOT PRESCRIBED (INTENTIONAL) Please choose reason not prescribed, below (Patient not taking: Reported on 3/12/2018) 0 each 0     Benzoyl Peroxide 2.5 % CREA Externally apply topically daily (Patient not taking: Reported on 3/12/2018) 21 g 11     insulin pen needle (BD KYLEE U/F) 32G X 4 MM Use 3 daily or as directed. (Patient not taking: Reported on 3/12/2018) 100 each prn     ONETOUCH  ULTRA test strip USE TO TEST BLOOD SUGAR 3 TIMES DAILY OR AS DIRECTED. (Patient not taking: Reported on 3/23/2018) 100 strip 3     Sharps Container MISC 1 each every 30 days (Patient not taking: Reported on 3/12/2018) 1 each 0     tamsulosin (FLOMAX) 0.4 MG capsule Take 1 capsule (0.4 mg) by mouth daily (Patient not taking: Reported on 3/12/2018) 60 capsule 0     tretinoin (RETIN-A) 0.05 % cream Spread a pea size amount into affected area topically at bedtime.  Use sunscreen SPF>20. (Patient not taking: Reported on 3/12/2018) 45 g 11     Warfarin Therapy Reminder 1 each continuous prn (Patient not taking: Reported on 3/12/2018)       zinc sulfate (ZINCATE) 220 (50 ZN) MG capsule Take 1 capsule (220 mg) by mouth daily (Patient not taking: Reported on 4/18/2018) 2 capsule 0       PAST SURGICAL HISTORY:  Past Surgical History:   Procedure Laterality Date     C INSERT ELECTRD LEADS/REPOSTION  10/27/2017          COLONOSCOPY N/A 4/17/2017    Procedure: COLONOSCOPY;  Surgeon: Rashaad Bundy MD;  Location: [facility]     INSERT INTRAAORTIC BALLOON PUMP Right 4/19/2017    Procedure: INSERT INTRAAORTIC BALLOON PUMP;  Right Subclavian Intra Aortic Balloon Pump Insertion using Maquet 40cc Ballon Catheter, Implentation of 8mm Gelweave Woven Vascular Prosthesis, Removal of Left Femoral Ballon Pump Catheter, Flouroscopy;  Surgeon: Keshav Leung MD;  Location: [facility] OR     PERCUTANEOUS MITRAL VALVE REPAIR N/A 5/1/2017    Procedure: PERCUTANEOUS MITRAL VALVE REPAIR ANESTHESIA;  Mitraclip Procedure Possible Cardiopulmonary Bypass ;  Surgeon: Ron Cortez MD;  Location: [facility] OR     SUBCLAVIAN AORTIC VALVE IMPLANT N/A 5/8/2017    Procedure: SUBCLAVIAN AORTIC VALVE IMPLANT;  Right Subclavian Graft Removal ;  Surgeon: Keshav Leung MD;  Location: [facility] OR       ALLERGIES  Seasonal allergies    FAMILY HX:  Family History   Problem Relation Age of Onset     Hypertension Mother      Type 2 Diabetes Father       "Hypertension Father      Myocardial Infarction No family hx of      Cerebrovascular Disease No family hx of      Coronary Artery Disease Early Onset No family hx of      Colon Cancer No family hx of      Prostate Cancer No family hx of        SOCIAL HX:  Social History     Social History     Marital status:      Spouse name: N/A     Number of children: N/A     Years of education: N/A     Occupational History     [employer]      Social History Main Topics     Smoking status: Former Smoker     Packs/day: 0.50     Years: 30.00     Types: Cigarettes     Quit date: 1/1/2017     Smokeless tobacco: Never Used     Alcohol use No     Drug use: No     Sexual activity: Not Currently     Other Topics Concern     None     Social History Narrative    . Remarried.    4 children with first marriage.    2 grand children.    Walks daily, but no formal exercise.            ROS:  Constitutional: No recent fever, chills, or sweats. No significant weight gain/loss.   ENT: No epistaxis.  Allergies/Immunologic: As above.   Respiratory: No hemoptysis.   Cardiovascular: As per HPI.   GI: No hematemesis, melena, or hematochezia.   : No hematuria.   Skin: No petechia or ecchymosis.       VITAL SIGNS:  /90 (BP Location: Left arm, Patient Position: Chair, Cuff Size: Adult Regular)  Pulse 74  Ht 1.575 m (5' 2\")  Wt 59.4 kg (131 lb)  SpO2 100%  BMI 23.96 kg/m2  Body mass index is 23.96 kg/(m^2).  Wt Readings from Last 2 Encounters:   09/05/18 59.4 kg (131 lb)   08/03/18 58.1 kg (128 lb 1.6 oz)       PHYSICAL EXAM  Luke Henao is a 53 year old male in no acute distress.  HEENT: Unremarkable.  Neck: JVP normal.  Carotids  bilaterally without bruits.  Lungs: CTA.  Cor: RRR. Normal S1 and S2.  No murmur, rub, or gallop.   Abd: Soft, nontender, nondistended.  NABS.  No pulsatile mass.  Extremities: No C/C/E.  Pulses +3/3 symmetric in upper and weak bilatearlly in lower extremities.  Neuro: Grossly " intact.    LABS    Lab Results   Component Value Date    WBC 11.2 10/27/2017     Lab Results   Component Value Date    RBC 4.48 10/27/2017     Lab Results   Component Value Date    HGB 13.5 10/27/2017     Lab Results   Component Value Date    HCT 42.2 10/27/2017     No components found for: MCT  Lab Results   Component Value Date    MCV 94 10/27/2017     Lab Results   Component Value Date    MCH 30.1 10/27/2017     Lab Results   Component Value Date    MCHC 32.0 10/27/2017     Lab Results   Component Value Date    RDW 13.0 10/27/2017     Lab Results   Component Value Date     10/27/2017      Recent Labs   Lab Test  08/03/18   0853  06/20/18   0841   NA  139  138   POTASSIUM  4.1  4.2   CHLORIDE  108  102   CO2  25  29   ANIONGAP  7  7   GLC  144*  100*   BUN  26  32*   CR  1.21  1.41*   ROB  8.3*  9.3     Recent Labs   Lab Test  03/27/17   0551   CHOL  128   HDL  36*   LDL  77   TRIG  76   NHDL  92      Results for SYDNIE SIERRA (MRN [id number]) as of 9/5/2018 10:24   Ref. Range 4/18/2018 06:54 5/16/2018 11:01 6/13/2018 11:30 6/20/2018 08:41 8/3/2018 08:53   Sodium Latest Ref Range: 133 - 144 mmol/L 139 140 138 138 139   Potassium Latest Ref Range: 3.4 - 5.3 mmol/L 4.3 4.6 4.3 4.2 4.1   Chloride Latest Ref Range: 94 - 109 mmol/L 105 106 104 102 108   Carbon Dioxide Latest Ref Range: 20 - 32 mmol/L 28 26 25 29 25   Urea Nitrogen Latest Ref Range: 7 - 30 mg/dL 34 (H) 32 (H) 26 32 (H) 26   Creatinine Latest Ref Range: 0.66 - 1.25 mg/dL 1.38 (H) 1.24 1.09 1.41 (H) 1.21   GFR Estimate Latest Ref Range: >60 mL/min/1.7m2 54 (L) 62 71 53 (L) 63   GFR Estimate If Black Latest Ref Range: >60 mL/min/1.7m2 66 74 86 64 76   Calcium Latest Ref Range: 8.5 - 10.1 mg/dL 9.0 9.0 8.6 9.3 8.3 (L)   Anion Gap Latest Ref Range: 3 - 14 mmol/L 6 8 9 7 7       EKG: SR 74 bpm.      ECHO: 4/18/18  Interpretation Summary  The Ejection Fraction is estimated at 30-35%. Left ventricular size is normal.  Global right ventricular function is  mildly reduced. The right ventricle is  normal size.  s/p MitralClip with MV mPG of 4mmHg at 90 bpm. Trace mitral insufficiency is  present.  Moderate tricuspid insufficiency is present.  This study was compared with the study from 12/15/17 .  There has been no change.    EDIN:  ULTRASOUND ANKLE-BRACHIAL INDEX DOPPLER WITH EXERCISE BILATERAL   12/27/2017 2:42 PM   FINDINGS:  Right EDIN: 0.93  Left EDIN: 0.93  Waveforms are triphasic.   Exercise: Patient exercised on a treadmill for 5 minutes at 10%  incline at a speed of 1.5 miles per hour with no symptoms.  Right exercise EDIN: 1.16  Left exercise EDIN: 1.10    IMPRESSION: Normal resting and exercise ABIs bilaterally without  evidence of arterial insufficiency.      ASSESSMENT AND PLAN:  1. Ischemic cardiomyopathy    2. Non-rheumatic mitral regurgitation    3. Coronary artery disease involving native coronary artery of native heart without angina pectoris    4. Hyperlipidemia LDL goal <70    5. Cerebrovascular accident (CVA) due to thrombosis of cerebral artery (H)    6. History of mitral valve repair with mitral clip 2017    7. S/P coronary artery stent placement 2017    8. ICD (implantable cardioverter-defibrillator) in place- Medtronic, single chamber- NOT dependent         Diagnosis Comments   1. Ischemic cardiomyopathy  No symptoms and tolerating medications.  Agree with uptitration by [facility] lab   Questionable need for digoxin and would rather offer spironolactone or Entresto   2. Non-rheumatic mitral regurgitation  Unable to detect MR on exam but plan for echo in April 2019   3. Coronary artery disease involving native coronary artery of native heart without angina pectoris  No CP. EKG today without changes.   4. Hyperlipidemia LDL goal <70  Continue with atorvastatin and last LDL 77   5. Cerebrovascular accident (CVA) due to thrombosis of cerebral artery (H)  On Plavix and without symptoms but states that he is not taking and starting baby aspirin.    6. History  of mitral valve repair with mitral clip 2017  Repeat echo in April 2019   7. S/P coronary artery stent placement 2017  Plavix has apparently been stopped. Starting now baby aspirin   8. ICD (implantable cardioverter-defibrillator) in place- Medtronic, single chamber- NOT dependent  Last check in October and was doing well       Follow up in April with echocardiogram        Amanda Amaral MD    Division of Cardiology  [facility]  [address] [place] [id number] [phone] Appointments  [phone] Fax  [phone] After hours    Clinic nurse:  Katelyn Simon LPN   Nurse Care Coordinator- Heart Care   [phone] option 1, than option 3    Academic Mailing address:  [facility]  Department of Internal Medicine () [address] [place] [address]

## 2018-09-05 NOTE — PROGRESS NOTES
CARDIOLOGY CLINIC FOLLOW UP    HPI: Luke Henao is a 53 year old male, being seen today for follow-up of ischemic cardiomyopathy and mitral valve disease, status post mitral clip and ICD placement.  Follows in [facility].  Last visit with me on August 28, 2018 and then considerations were made for follow-up echocardiogram.  At the time of his visit he was taking lisinopril 2.5 mg daily and metoprolol succinate 50 mg daily.  Since then these both have been uptitrated to lisinopril 2.5 mg in the morning and 5 mg in the evening and metoprolol succinate is now at 150 mg daily.  During the last visit on August 3rd he was considered for Entresto.     used for translation: Only complaint for pain to feet/legs as before. Now 2 years with leg pain. Denies CP/SOB/LOC. No bleeding. Not on aspirin, told he did not need it since on warfarin/Plavix.No limitations in walking stairs/street.Off Plavix for a while and willing to consider aspirin. Continues to work.     PAST MEDICAL HISTORY:  Past Medical History:   Diagnosis Date     Acne      CAD (coronary artery disease) 2017    RCA STEMI s/p balloon angioplasty and multiple Sofie to RCA, LCx, and LAD     Cardiogenic shock (H)      DVT (deep venous thrombosis) (H) 2017    left internal jugular (line-associated); left common femoral; on coumadin     Ischemic cardiomyopathy 2017    EF 30-35%     Ischemic stroke (H) 2017    no residual deficits     Lower GI bleed 2017    due to rectal ulcer     Mitral valve insufficiency, ischemic 2017    s/p Mitral Clip placement      Skin ulcer of sacrum (H)      Systolic heart failure (H)      Type 2 diabetes mellitus (H)      Or  CURRENT MEDICATIONS:  Current Outpatient Prescriptions   Medication Sig Dispense Refill     atorvastatin (LIPITOR) 40 MG tablet 1 tablet (40 mg) by Oral or Feeding Tube route daily 60 tablet 7     bumetanide (BUMEX) 0.5 MG tablet Take 1 tablet (0.5 mg) by mouth every other day 45 tablet 3     cetirizine (ZYRTEC)  10 MG tablet Take 10 mg by mouth daily       clopidogrel (PLAVIX) 75 MG tablet Take 1 tablet (75 mg) by mouth daily 60 tablet 11     digoxin (LANOXIN) 125 MCG tablet Take one tablet on M, W, F, Sunday. 60 tablet 5     finasteride (PROSCAR) 5 MG tablet Take 1 tablet (5 mg) by mouth daily 60 tablet 11     glipiZIDE (GLUCOTROL) 5 MG tablet Take 1 tablet (5 mg) by mouth 2 times daily (before meals) 180 tablet 3     lisinopril (PRINIVIL/ZESTRIL) 2.5 MG tablet Take 2.5 mg in the am and 5 mg in the evening. 90 tablet 11     metFORMIN (GLUCOPHAGE-XR) 500 MG 24 hr tablet Take 1 tablet (500 mg) by mouth daily (with dinner) 180 tablet 3     metoprolol succinate (TOPROL-XL) 50 MG 24 hr tablet Take 3 tablets (150 mg) by mouth daily 180 tablet 3     pantoprazole (PROTONIX) 40 MG EC tablet Take 1 tablet (40 mg) by mouth daily 90 tablet 3     potassium chloride SA (K-DUR/KLOR-CON M) 10 MEQ CR tablet Take 1 pill every other day when you take bumex. 30 tablet 3     alcohol swab prep pads Use to swab area of injection/mary alice as directed 3 x per day (Patient not taking: Reported on 3/12/2018) 100 each 11     aspirin 81 MG chewable tablet Take 1 tablet (81 mg) by mouth daily (Patient not taking: Reported on 3/12/2018) 36 tablet 3     ASPIRIN NOT PRESCRIBED (INTENTIONAL) Please choose reason not prescribed, below (Patient not taking: Reported on 3/12/2018) 0 each 0     Benzoyl Peroxide 2.5 % CREA Externally apply topically daily (Patient not taking: Reported on 3/12/2018) 21 g 11     insulin pen needle (BD KYLEE U/F) 32G X 4 MM Use 3 daily or as directed. (Patient not taking: Reported on 3/12/2018) 100 each prn     ONETOUCH ULTRA test strip USE TO TEST BLOOD SUGAR 3 TIMES DAILY OR AS DIRECTED. (Patient not taking: Reported on 3/23/2018) 100 strip 3     Sharps Container MISC 1 each every 30 days (Patient not taking: Reported on 3/12/2018) 1 each 0     tamsulosin (FLOMAX) 0.4 MG capsule Take 1 capsule (0.4 mg) by mouth daily (Patient not  taking: Reported on 3/12/2018) 60 capsule 0     tretinoin (RETIN-A) 0.05 % cream Spread a pea size amount into affected area topically at bedtime.  Use sunscreen SPF>20. (Patient not taking: Reported on 3/12/2018) 45 g 11     Warfarin Therapy Reminder 1 each continuous prn (Patient not taking: Reported on 3/12/2018)       zinc sulfate (ZINCATE) 220 (50 ZN) MG capsule Take 1 capsule (220 mg) by mouth daily (Patient not taking: Reported on 4/18/2018) 2 capsule 0       PAST SURGICAL HISTORY:  Past Surgical History:   Procedure Laterality Date     C INSERT ELECTRD LEADS/REPOSTION  10/27/2017          COLONOSCOPY N/A 4/17/2017    Procedure: COLONOSCOPY;  Surgeon: Rashaad Bundy MD;  Location: [facility]     INSERT INTRAAORTIC BALLOON PUMP Right 4/19/2017    Procedure: INSERT INTRAAORTIC BALLOON PUMP;  Right Subclavian Intra Aortic Balloon Pump Insertion using Maquet 40cc Ballon Catheter, Implentation of 8mm Gelweave Woven Vascular Prosthesis, Removal of Left Femoral Ballon Pump Catheter, Flouroscopy;  Surgeon: Keshav Leung MD;  Location: [facility]     PERCUTANEOUS MITRAL VALVE REPAIR N/A 5/1/2017    Procedure: PERCUTANEOUS MITRAL VALVE REPAIR ANESTHESIA;  Mitraclip Procedure Possible Cardiopulmonary Bypass ;  Surgeon: Ron Cortez MD;  Location: [facility]     SUBCLAVIAN AORTIC VALVE IMPLANT N/A 5/8/2017    Procedure: SUBCLAVIAN AORTIC VALVE IMPLANT;  Right Subclavian Graft Removal ;  Surgeon: Keshav Leung MD;  Location: [facility]       ALLERGIES  Seasonal allergies    FAMILY HX:  Family History   Problem Relation Age of Onset     Hypertension Mother      Type 2 Diabetes Father      Hypertension Father      Myocardial Infarction No family hx of      Cerebrovascular Disease No family hx of      Coronary Artery Disease Early Onset No family hx of      Colon Cancer No family hx of      Prostate Cancer No family hx of        SOCIAL HX:  Social History     Social History     Marital status:       "Spouse name: N/A     Number of children: N/A     Years of education: N/A     Occupational History     [employer]      Social History Main Topics     Smoking status: Former Smoker     Packs/day: 0.50     Years: 30.00     Types: Cigarettes     Quit date: 1/1/2017     Smokeless tobacco: Never Used     Alcohol use No     Drug use: No     Sexual activity: Not Currently     Other Topics Concern     None     Social History Narrative    . Remarried.    4 children with first marriage.    2 grand children.    Walks daily, but no formal exercise.            ROS:  Constitutional: No recent fever, chills, or sweats. No significant weight gain/loss.   ENT: No epistaxis.  Allergies/Immunologic: As above.   Respiratory: No hemoptysis.   Cardiovascular: As per HPI.   GI: No hematemesis, melena, or hematochezia.   : No hematuria.   Skin: No petechia or ecchymosis.       VITAL SIGNS:  /90 (BP Location: Left arm, Patient Position: Chair, Cuff Size: Adult Regular)  Pulse 74  Ht 1.575 m (5' 2\")  Wt 59.4 kg (131 lb)  SpO2 100%  BMI 23.96 kg/m2  Body mass index is 23.96 kg/(m^2).  Wt Readings from Last 2 Encounters:   09/05/18 59.4 kg (131 lb)   08/03/18 58.1 kg (128 lb 1.6 oz)       PHYSICAL EXAM  Luke Henao is a 53 year old male in no acute distress.  HEENT: Unremarkable.  Neck: JVP normal.  Carotids  bilaterally without bruits.  Lungs: CTA.  Cor: RRR. Normal S1 and S2.  No murmur, rub, or gallop.   Abd: Soft, nontender, nondistended.  NABS.  No pulsatile mass.  Extremities: No C/C/E.  Pulses +3/3 symmetric in upper and weak bilatearlly in lower extremities.  Neuro: Grossly intact.    LABS    Lab Results   Component Value Date    WBC 11.2 10/27/2017     Lab Results   Component Value Date    RBC 4.48 10/27/2017     Lab Results   Component Value Date    HGB 13.5 10/27/2017     Lab Results   Component Value Date    HCT 42.2 10/27/2017     No components found for: MCT  Lab Results   Component Value Date    MCV " 94 10/27/2017     Lab Results   Component Value Date    MCH 30.1 10/27/2017     Lab Results   Component Value Date    MCHC 32.0 10/27/2017     Lab Results   Component Value Date    RDW 13.0 10/27/2017     Lab Results   Component Value Date     10/27/2017      Recent Labs   Lab Test  08/03/18   0853  06/20/18   0841   NA  139  138   POTASSIUM  4.1  4.2   CHLORIDE  108  102   CO2  25  29   ANIONGAP  7  7   GLC  144*  100*   BUN  26  32*   CR  1.21  1.41*   ROB  8.3*  9.3     Recent Labs   Lab Test  03/27/17   0551   CHOL  128   HDL  36*   LDL  77   TRIG  76   NHDL  92      Results for SYDNIE SIERRA (MRN [id number]) as of 9/5/2018 10:24   Ref. Range 4/18/2018 06:54 5/16/2018 11:01 6/13/2018 11:30 6/20/2018 08:41 8/3/2018 08:53   Sodium Latest Ref Range: 133 - 144 mmol/L 139 140 138 138 139   Potassium Latest Ref Range: 3.4 - 5.3 mmol/L 4.3 4.6 4.3 4.2 4.1   Chloride Latest Ref Range: 94 - 109 mmol/L 105 106 104 102 108   Carbon Dioxide Latest Ref Range: 20 - 32 mmol/L 28 26 25 29 25   Urea Nitrogen Latest Ref Range: 7 - 30 mg/dL 34 (H) 32 (H) 26 32 (H) 26   Creatinine Latest Ref Range: 0.66 - 1.25 mg/dL 1.38 (H) 1.24 1.09 1.41 (H) 1.21   GFR Estimate Latest Ref Range: >60 mL/min/1.7m2 54 (L) 62 71 53 (L) 63   GFR Estimate If Black Latest Ref Range: >60 mL/min/1.7m2 66 74 86 64 76   Calcium Latest Ref Range: 8.5 - 10.1 mg/dL 9.0 9.0 8.6 9.3 8.3 (L)   Anion Gap Latest Ref Range: 3 - 14 mmol/L 6 8 9 7 7       EKG: SR 74 bpm.      ECHO: 4/18/18  Interpretation Summary  The Ejection Fraction is estimated at 30-35%. Left ventricular size is normal.  Global right ventricular function is mildly reduced. The right ventricle is  normal size.  s/p MitralClip with MV mPG of 4mmHg at 90 bpm. Trace mitral insufficiency is  present.  Moderate tricuspid insufficiency is present.  This study was compared with the study from 12/15/17 .  There has been no change.    EDIN:  ULTRASOUND ANKLE-BRACHIAL INDEX DOPPLER WITH EXERCISE  BILATERAL   12/27/2017 2:42 PM   FINDINGS:  Right EDIN: 0.93  Left EDIN: 0.93  Waveforms are triphasic.   Exercise: Patient exercised on a treadmill for 5 minutes at 10%  incline at a speed of 1.5 miles per hour with no symptoms.  Right exercise EDIN: 1.16  Left exercise EDIN: 1.10    IMPRESSION: Normal resting and exercise ABIs bilaterally without  evidence of arterial insufficiency.      ASSESSMENT AND PLAN:  1. Ischemic cardiomyopathy    2. Non-rheumatic mitral regurgitation    3. Coronary artery disease involving native coronary artery of native heart without angina pectoris    4. Hyperlipidemia LDL goal <70    5. Cerebrovascular accident (CVA) due to thrombosis of cerebral artery (H)    6. History of mitral valve repair with mitral clip 2017    7. S/P coronary artery stent placement 2017    8. ICD (implantable cardioverter-defibrillator) in place- Medtronic, single chamber- NOT dependent         Diagnosis Comments   1. Ischemic cardiomyopathy  No symptoms and tolerating medications.  Agree with uptitration by [facility] lab   Questionable need for digoxin and would rather offer spironolactone or Entresto   2. Non-rheumatic mitral regurgitation  Unable to detect MR on exam but plan for echo in April 2019   3. Coronary artery disease involving native coronary artery of native heart without angina pectoris  No CP. EKG today without changes.   4. Hyperlipidemia LDL goal <70  Continue with atorvastatin and last LDL 77   5. Cerebrovascular accident (CVA) due to thrombosis of cerebral artery (H)  On Plavix and without symptoms but states that he is not taking and starting baby aspirin.    6. History of mitral valve repair with mitral clip 2017  Repeat echo in April 2019   7. S/P coronary artery stent placement 2017  Plavix has apparently been stopped. Starting now baby aspirin   8. ICD (implantable cardioverter-defibrillator) in place- Medtronic, single chamber- NOT dependent  Last check in October and was doing well        Follow up in April with echocardiogram        Amanda Amaral MD    Division of Cardiology  [facility]  [address] [place] [id number] [phone] Appointments  [phone] Fax  [phone] After hours    Clinic nurse:  Katelyn Simon LPN   Nurse Care Coordinator- [facility]   [phone] option 1, than option 3    Academic Mailing address:  [facility]  Department of Internal Medicine [facility] [id number])  [address] [place] [id number]

## 2018-09-05 NOTE — MR AVS SNAPSHOT
After Visit Summary   9/5/2018    Luke Henao    MRN: [id number]           Patient Information     Date Of Birth          1965        Visit Information        Provider Department      9/5/2018 9:45 AM Osorio Cabrera; Amanda Amaral MD [facility]        Today's Diagnoses     Ischemic cardiomyopathy    -  1    Non-rheumatic mitral regurgitation        Coronary artery disease involving native coronary artery of native heart without angina pectoris        Hyperlipidemia LDL goal <70        Cerebrovascular accident (CVA) due to thrombosis of cerebral artery (H)        History of mitral valve repair with mitral clip 2017        S/P coronary artery stent placement 2017        ICD (implantable cardioverter-defibrillator) in place- Medtronic, single chamber- NOT dependent          Care Instructions    You were seen today in the Cardiovascular Clinic at the [facility].     Cardiology Providers you saw during your visit: Dr. Amaral     Diagnosis:   Encounter Diagnoses   Name Primary?     Non-rheumatic mitral regurgitation      Ischemic cardiomyopathy Yes     Coronary artery disease involving native coronary artery of native heart without angina pectoris      Hyperlipidemia LDL goal <70      Cerebrovascular accident (CVA) due to thrombosis of cerebral artery (H)      History of mitral valve repair with mitral clip 2017      S/P coronary artery stent placement 2017      ICD (implantable cardioverter-defibrillator) in place- Medtronic, single chamber- NOT dependent         Results: Discussed with you today EDIN/EKG      Orders:   Orders Placed This Encounter   Procedures     Follow-Up with Cardiologist     EKG 12-lead, tracing only     Echo Complete       Current Medication List  Current Outpatient Prescriptions   Medication Sig Dispense Refill     atorvastatin (LIPITOR) 40 MG tablet 1 tablet (40 mg) by Oral or Feeding Tube route daily 60 tablet 7     bumetanide (BUMEX) 0.5 MG tablet  Take 1 tablet (0.5 mg) by mouth every other day 45 tablet 3     cetirizine (ZYRTEC) 10 MG tablet Take 10 mg by mouth daily       clopidogrel (PLAVIX) 75 MG tablet Take 1 tablet (75 mg) by mouth daily 60 tablet 11     digoxin (LANOXIN) 125 MCG tablet Take one tablet on M, W, F, Sunday. 60 tablet 5     finasteride (PROSCAR) 5 MG tablet Take 1 tablet (5 mg) by mouth daily 60 tablet 11     glipiZIDE (GLUCOTROL) 5 MG tablet Take 1 tablet (5 mg) by mouth 2 times daily (before meals) 180 tablet 3     lisinopril (PRINIVIL/ZESTRIL) 2.5 MG tablet Take 2.5 mg in the am and 5 mg in the evening. 90 tablet 11     metFORMIN (GLUCOPHAGE-XR) 500 MG 24 hr tablet Take 1 tablet (500 mg) by mouth daily (with dinner) 180 tablet 3     metoprolol succinate (TOPROL-XL) 50 MG 24 hr tablet Take 3 tablets (150 mg) by mouth daily 180 tablet 3     pantoprazole (PROTONIX) 40 MG EC tablet Take 1 tablet (40 mg) by mouth daily 90 tablet 3     potassium chloride SA (K-DUR/KLOR-CON M) 10 MEQ CR tablet Take 1 pill every other day when you take bumex. 30 tablet 3     alcohol swab prep pads Use to swab area of injection/mary alice as directed 3 x per day (Patient not taking: Reported on 3/12/2018) 100 each 11     aspirin 81 MG chewable tablet Take 1 tablet (81 mg) by mouth daily (Patient not taking: Reported on 3/12/2018) 36 tablet 3     ASPIRIN NOT PRESCRIBED (INTENTIONAL) Please choose reason not prescribed, below (Patient not taking: Reported on 3/12/2018) 0 each 0     Benzoyl Peroxide 2.5 % CREA Externally apply topically daily (Patient not taking: Reported on 3/12/2018) 21 g 11     insulin pen needle (BD KYLEE U/F) 32G X 4 MM Use 3 daily or as directed. (Patient not taking: Reported on 3/12/2018) 100 each prn     ONETOUCH ULTRA test strip USE TO TEST BLOOD SUGAR 3 TIMES DAILY OR AS DIRECTED. (Patient not taking: Reported on 3/23/2018) 100 strip 3     Sharps Container MISC 1 each every 30 days (Patient not taking: Reported on 3/12/2018) 1 each 0      "tamsulosin (FLOMAX) 0.4 MG capsule Take 1 capsule (0.4 mg) by mouth daily (Patient not taking: Reported on 3/12/2018) 60 capsule 0     tretinoin (RETIN-A) 0.05 % cream Spread a pea size amount into affected area topically at bedtime.  Use sunscreen SPF>20. (Patient not taking: Reported on 3/12/2018) 45 g 11     Warfarin Therapy Reminder 1 each continuous prn (Patient not taking: Reported on 3/12/2018)       zinc sulfate (ZINCATE) 220 (50 ZN) MG capsule Take 1 capsule (220 mg) by mouth daily (Patient not taking: Reported on 2018) 2 capsule 0         Medications Discontinued:  There are no discontinued medications.      Recommendations:   1. Uptitration of ACE/BB and/or Entresto vs spironlactone      Follow-up: In 2019 with echocardiogram         Please feel free to call me with any questions or concerns.       Katelyn Simon LPN     Questions: [phone].   First press #1 for the [employer] and then press #3 for \"Medical Questions\" to reach us Cardiology Nurses.     [phone].   First press #1 for the [employer] and then press #1      On Call Cardiologist for after hours or on weekends: [phone]   option #4 and ask to speak to the on-call Cardiologist.          If you need a medication refill please contact your pharmacy.  Please allow 3 business days for your refill to be completed.  ________________________________________________________________________________________________________________________________                Follow-ups after your visit        Additional Services     Follow-Up with Cardiologist                 Your next 10 appointments already scheduled     Oct 15, 2018 10:00 AM CDT   (Arrive by 9:45 AM)   Implanted Defibulator with Uc Cv Device [address] [place] [address] ([facility])    [address] [facility]  [address] [place] [address] [phone]            Oct 15, 2018 10:30 AM CDT   (Arrive by 10:15 AM)   RETURN ARRHYTHMIA " with RUBI Murphy CNP   [facility] ([facility])    [address]  [address]  [facility] [id number] [phone]            Oct 15, 2018 11:00 AM CDT   Lab with UC LAB    Health Lab ([facility])    [address]  1st Floor  [facility] [address]   [phone]            Oct 15, 2018 11:30 AM CDT   (Arrive by 11:15 AM)   CORE RETURN with Emily Collado NP   [facility] ([facility])    [address]  [address] [place] [id number] [phone]            Apr 03, 2019  9:30 AM CDT   Ech Complete with MIS   [facility] Echo ([facility])    [address] [place] [address]  3rd Floor  [facility] [id number] [phone]           1.  Please bring or wear a comfortable two-piece outfit. 2.  You may eat, drink and take your normal medicines. 3.  For any questions that cannot be answered, please contact the ordering physician 4.  Please do not wear perfumes or scented lotions on the day of your exam.            Apr 03, 2019 10:00 AM CDT   (Arrive by 9:45 AM)   Return Visit with Amanda Amaral MD   [facility] ([facility])    [address] [place] [address]  [address] [place] [id number] [phone]              Future tests that were ordered for you today     Open Future Orders        Priority Expected Expires Ordered    Follow-Up with Cardiologist Routine 4/3/2019 6/2/2019 9/5/2018    Echo Complete Routine 4/3/2019 6/2/2019 9/5/2018    EKG 12-lead, tracing only Routine 9/5/2018 12/4/2018 9/5/2018            Who to contact     If you have questions or need follow up information about today's clinic visit or your schedule please contact [facility] directly at [phone].  Normal or non-critical lab and imaging results will be communicated to you by MyChart, letter or phone within 4 business days after the  "clinic has received the results. If you do not hear from us within 7 days, please contact the clinic through LiveRailt or phone. If you have a critical or abnormal lab result, we will notify you by phone as soon as possible.  Submit refill requests through [employer]high[employer] or call your pharmacy and they will forward the refill request to us. Please allow 3 business days for your refill to be completed.          Additional Information About Your Visit        Care EveryWhere ID     This is your Care EveryWhere ID. This could be used by other organizations to access your [place] medical records  [id number]        Your Vitals Were     Pulse Height Pulse Oximetry BMI (Body Mass Index)          74 1.575 m (5' 2\") 100% 23.96 kg/m2         Blood Pressure from Last 3 Encounters:   09/05/18 136/90   08/03/18 139/89   06/13/18 111/78    Weight from Last 3 Encounters:   09/05/18 59.4 kg (131 lb)   08/03/18 58.1 kg (128 lb 1.6 oz)   06/13/18 57.2 kg (126 lb 1.6 oz)              We Performed the Following     Follow-Up with Cardiologist          Where to get your medicines      These medications [facility] sent to [id number] - [address] [address]     Phone:  [phone]     aspirin 81 MG chewable tablet          Primary Care Provider Office Phone # Fax #    Shanna Church [phone]       [address] [facility] [address]        Equal Access to Services     [facility] JAMIE AH: Hadii aad ku hadasho Soomaali, waaxda luqadaha, qaybta kaalmada adeegyada, karen [facility] Hospitaljeremiah rosen. So [facility] [phone].    ATENCIÓN: Si habla español, tiene a suarez disposición servicios gratuitos de asistencia lingüística. Llame al [phone].    We comply with applicable federal civil rights laws and [place] laws. We do not discriminate on the basis of race, color, national origin, age, disability, sex, sexual orientation, or gender identity.            Thank " you!     Thank you for choosing [facility]  for your care. Our goal is always to provide you with excellent care. Hearing back from our patients is one way we can continue to improve our services. Please take a few minutes to complete the written survey that you may receive in the mail after your visit with us. Thank you!             Your Updated Medication List - Protect others around you: Learn how to safely use, store and throw away your medicines at www.disposemymeds.org.          This list is accurate as of 9/5/18 11:07 AM.  Always use your most recent med list.                   Brand Name Dispense Instructions for use Diagnosis    alcohol swab prep pads     100 each    Use to swab area of injection/mary alice as directed 3 x per day        aspirin 81 MG chewable tablet     90 tablet    Take 1 tablet (81 mg) by mouth daily    Coronary artery disease involving native coronary artery of native heart without angina pectoris       ASPIRIN NOT PRESCRIBED    INTENTIONAL    0 each    Please choose reason not prescribed, below    Cardiogenic shock (H), Type 2 diabetes mellitus without complication, with long-term current use of insulin (H)       atorvastatin 40 MG tablet    LIPITOR    60 tablet    1 tablet (40 mg) by Oral or Feeding Tube route daily    Coronary artery disease involving native coronary artery of native heart without angina pectoris, Cardiogenic shock (H)       Benzoyl Peroxide 2.5 % Crea     21 g    Externally apply topically daily    Acne vulgaris       bumetanide 0.5 MG tablet    BUMEX    45 tablet    Take 1 tablet (0.5 mg) by mouth every other day    Ischemic cardiomyopathy       cetirizine 10 MG tablet    zyrTEC     Take 10 mg by mouth daily        clopidogrel 75 MG tablet    PLAVIX    60 tablet    Take 1 tablet (75 mg) by mouth daily    ST elevation myocardial infarction (STEMI), unspecified artery (H)       digoxin 125 MCG tablet    LANOXIN    60 tablet    Take one tablet on M, W, F, Sunday.     Coronary artery disease involving native coronary artery of native heart without angina pectoris       finasteride 5 MG tablet    PROSCAR    60 tablet    Take 1 tablet (5 mg) by mouth daily    Hypertrophy of prostate with urinary obstruction       glipiZIDE 5 MG tablet    GLUCOTROL    180 tablet    Take 1 tablet (5 mg) by mouth 2 times daily (before meals)    Type 2 diabetes mellitus without complication, without long-term current use of insulin (H)       insulin pen needle 32G X 4 MM    BD KYLEE U/F    100 each    Use 3 daily or as directed.    Diabetes mellitus type 2, insulin dependent (H)       lisinopril 2.5 MG tablet    PRINIVIL/Zestril    90 tablet    Take 2.5 mg in the am and 5 mg in the evening.    Ischemic cardiomyopathy       metFORMIN 500 MG 24 hr tablet    GLUCOPHAGE-XR    180 tablet    Take 1 tablet (500 mg) by mouth daily (with dinner)    Type 2 diabetes mellitus without complication, without long-term current use of insulin (H)       metoprolol succinate 50 MG 24 hr tablet    TOPROL-XL    180 tablet    Take 3 tablets (150 mg) by mouth daily    Chronic systolic congestive heart failure (H)       ONETOUCH ULTRA test strip   Generic drug:  blood glucose monitoring     100 strip    USE TO TEST BLOOD SUGAR 3 TIMES DAILY OR AS DIRECTED.    Diabetes mellitus type 2, insulin dependent (H)       pantoprazole 40 MG EC tablet    PROTONIX    90 tablet    Take 1 tablet (40 mg) by mouth daily    Gastroesophageal reflux disease, esophagitis presence not specified       potassium chloride SA 10 MEQ CR tablet    K-DUR/KLOR-CON M    30 tablet    Take 1 pill every other day when you take bumex.    Chronic systolic congestive heart failure (H)       Sharps Container Misc     1 each    1 each every 30 days    Diabetes mellitus type 2, insulin dependent (H)       tamsulosin 0.4 MG capsule    FLOMAX    60 capsule    Take 1 capsule (0.4 mg) by mouth daily    Ischemic cardiomyopathy       tretinoin 0.05 % cream    RETIN-A     45 g    Spread a pea size amount into affected area topically at bedtime.  Use sunscreen SPF>20.    Acne vulgaris       Warfarin Therapy Reminder      1 each continuous prn    Deep vein thrombosis (DVT) of left lower extremity, unspecified chronicity, unspecified vein (H)       zinc sulfate 220 (50 Zn) MG capsule    ZINCATE    2 capsule    Take 1 capsule (220 mg) by mouth daily    Ischemic cardiomyopathy

## 2018-09-05 NOTE — PATIENT INSTRUCTIONS
You were seen today in the Cardiovascular Clinic at the [facility].     Cardiology Providers you saw during your visit: Dr. Amaral     Diagnosis:   Encounter Diagnoses   Name Primary?     Non-rheumatic mitral regurgitation      Ischemic cardiomyopathy Yes     Coronary artery disease involving native coronary artery of native heart without angina pectoris      Hyperlipidemia LDL goal <70      Cerebrovascular accident (CVA) due to thrombosis of cerebral artery (H)      History of mitral valve repair with mitral clip 2017      S/P coronary artery stent placement 2017      ICD (implantable cardioverter-defibrillator) in place- Medtronic, single chamber- NOT dependent         Results: Discussed with you today EDIN/EKG      Orders:   Orders Placed This Encounter   Procedures     Follow-Up with Cardiologist     EKG 12-lead, tracing only     Echo Complete       Current Medication List  Current Outpatient Prescriptions   Medication Sig Dispense Refill     aspirin 81 MG chewable tablet Take 1 tablet (81 mg) by mouth daily 90 tablet 3     cetirizine (ZYRTEC) 10 MG tablet Take 10 mg by mouth daily       digoxin (LANOXIN) 125 MCG tablet Take one tablet on M, W, F, Sunday. 60 tablet 5     glipiZIDE (GLUCOTROL) 5 MG tablet Take 1 tablet (5 mg) by mouth 2 times daily (before meals) 180 tablet 3     lisinopril (PRINIVIL/ZESTRIL) 2.5 MG tablet Take 2.5 mg in the am and 5 mg in the evening. 90 tablet 11     metFORMIN (GLUCOPHAGE-XR) 500 MG 24 hr tablet Take 1 tablet (500 mg) by mouth daily (with dinner) 180 tablet 3     metoprolol succinate (TOPROL-XL) 50 MG 24 hr tablet Take 3 tablets (150 mg) by mouth daily 180 tablet 3     pantoprazole (PROTONIX) 40 MG EC tablet Take 1 tablet (40 mg) by mouth daily 90 tablet 3     potassium chloride SA (K-DUR/KLOR-CON M) 10 MEQ CR tablet Take 1 pill every other day when you take bumex. 30 tablet 3     [DISCONTINUED] bumetanide (BUMEX) 0.5 MG tablet Take 1 tablet (0.5 mg) by mouth  every other day 45 tablet 3     alcohol swab prep pads Use to swab area of injection/mary alice as directed 3 x per day (Patient not taking: Reported on 3/12/2018) 100 each 11     ASPIRIN NOT PRESCRIBED (INTENTIONAL) Please choose reason not prescribed, below (Patient not taking: Reported on 3/12/2018) 0 each 0     atorvastatin (LIPITOR) 40 MG tablet 1 TABLET (40 MG) BY ORAL OR FEEDING TUBE ROUTE DAILY 60 tablet 0     Benzoyl Peroxide 2.5 % CREA Externally apply topically daily (Patient not taking: Reported on 3/12/2018) 21 g 11     bumetanide (BUMEX) 0.5 MG tablet Take 1 tablet (0.5 mg) by mouth every other day 45 tablet 3     cetirizine (ZYRTEC) 10 MG tablet TAKE 1 TABLET (10 MG) BY MOUTH DAILY 60 tablet 6     clopidogrel (PLAVIX) 75 MG tablet TAKE 1 TABLET (75 MG) BY MOUTH DAILY 60 tablet 2     finasteride (PROSCAR) 5 MG tablet TAKE 1 TABLET (5 MG) BY MOUTH DAILY 60 tablet 6     insulin pen needle (BD KYLEE U/F) 32G X 4 MM Use 3 daily or as directed. (Patient not taking: Reported on 3/12/2018) 100 each prn     ONETOUCH ULTRA test strip USE TO TEST BLOOD SUGAR 3 TIMES DAILY OR AS DIRECTED. (Patient not taking: Reported on 3/23/2018) 100 strip 3     Sharps Container MISC 1 each every 30 days (Patient not taking: Reported on 3/12/2018) 1 each 0     tamsulosin (FLOMAX) 0.4 MG capsule Take 1 capsule (0.4 mg) by mouth daily (Patient not taking: Reported on 3/12/2018) 60 capsule 0     tretinoin (RETIN-A) 0.05 % cream Spread a pea size amount into affected area topically at bedtime.  Use sunscreen SPF>20. (Patient not taking: Reported on 3/12/2018) 45 g 11     Warfarin Therapy Reminder 1 each continuous prn (Patient not taking: Reported on 3/12/2018)       zinc sulfate (ZINCATE) 220 (50 ZN) MG capsule Take 1 capsule (220 mg) by mouth daily (Patient not taking: Reported on 4/18/2018) 2 capsule 0         Medications Discontinued:  Medications Discontinued During This Encounter   Medication Reason     aspirin 81 MG chewable  "tablet Reorder         Recommendations:   1. Uptitration of ACE/BB and/or Entresto vs spironlactone      Follow-up: In 2019 with echocardiogram         Please feel free to call me with any questions or concerns.       Katelyn Simon LPN     Questions: [phone].   First press #1 for the [employer] and then press #3 for \"Medical Questions\" to reach us Cardiology Nurses.     [phone].   First press #1 for the [employer] and then press #1      On Call Cardiologist for after hours or on weekends: [phone]   option #4 and ask to speak to the on-call Cardiologist.          If you need a medication refill please contact your pharmacy.  Please allow 3 business days for your refill to be completed.  ________________________________________________________________________________________________________________________________        "

## 2018-09-10 DIAGNOSIS — I25.10 CORONARY ARTERY DISEASE INVOLVING NATIVE CORONARY ARTERY OF NATIVE HEART WITHOUT ANGINA PECTORIS: ICD-10-CM

## 2018-09-10 DIAGNOSIS — R57.0 CARDIOGENIC SHOCK (H): ICD-10-CM

## 2018-09-10 NOTE — LETTER
[facility]  [address] [id number] [phone]            Luke Henao  [address]  [facility] [address]        September 11, 2018    Dear Luke,    While refilling your prescription today, we noticed that you are due to have labs drawn.  We will refill your prescription for 30 days, but a follow-up appointment must be made before any additional refills can be approved.     Taking care of your health is important to us and we look forward to seeing you in the near future.  Please call us at [phone] or [phone] (or use KissMyAds) to schedule an appointment.     Please disregard this notice if you have already made an appointment.    Sincerely,        [facility]

## 2018-09-10 NOTE — TELEPHONE ENCOUNTER
"Requested Prescriptions   Pending Prescriptions Disp Refills     atorvastatin (LIPITOR) 40 MG tablet [Pharmacy Med Name: ATORVASTATIN 40 MG TABLET] 60 tablet 6      Last Written Prescription Date:  17  Last Fill Quantity: 60,  # refills: 7   Last office visit: 2017 with prescribing provider:  17   Future Office Visit:  0 Si TABLET (40 MG) BY ORAL OR FEEDING TUBE ROUTE DAILY    Statins Protocol Failed    9/10/2018  2:12 AM       Failed - LDL on file in past 12 months    Recent Labs   Lab Test  17   0551   LDL  77            Passed - No abnormal creatine kinase in past 12 months    Recent Labs   Lab Test  18   0939   CKT  119               Passed - Recent (12 mo) or future (30 days) visit within the authorizing provider's specialty    Patient had office visit in the last 12 months or has a visit in the next 30 days with authorizing provider or within the authorizing provider's specialty.  See \"Patient Info\" tab in inbasket, or \"Choose Columns\" in Meds & Orders section of the refill encounter.           Passed - Patient is age 18 or older        "

## 2018-09-11 DIAGNOSIS — I25.5 ISCHEMIC CARDIOMYOPATHY: ICD-10-CM

## 2018-09-11 RX ORDER — ATORVASTATIN CALCIUM 40 MG/1
TABLET, FILM COATED ORAL
Qty: 60 TABLET | Refills: 0 | Status: SHIPPED | OUTPATIENT
Start: 2018-09-11 | End: 2018-11-08

## 2018-09-11 RX ORDER — BUMETANIDE 0.5 MG/1
0.5 TABLET ORAL EVERY OTHER DAY
Qty: 45 TABLET | Refills: 3 | Status: SHIPPED | OUTPATIENT
Start: 2018-09-11 | End: 2018-11-06

## 2018-09-30 DIAGNOSIS — E11.9 TYPE 2 DIABETES MELLITUS WITHOUT COMPLICATION, WITHOUT LONG-TERM CURRENT USE OF INSULIN (H): ICD-10-CM

## 2018-09-30 NOTE — TELEPHONE ENCOUNTER
Requested Prescriptions   Pending Prescriptions Disp Refills     metFORMIN (GLUCOPHAGE-XR) 500 MG 24 hr tablet [Pharmacy Med Name: METFORMIN  MG TABLET] 180 tablet 3    Last Written Prescription Date:  3/12/2018  Last Fill Quantity: 180,  # refills: 3   Last office visit: 12/22/2017 with prescribing provider:  12/22/2017   Future Office Visit:   Next 5 appointments (look out 90 days)     Oct 01, 2018  8:45 AM CDT   Office Visit with Shanna Church MD, [employer]   [facility] ([facility])    [address] [address] [phone]                  Sig: TAKE 2 TABLETS (1,000 MG) BY MOUTH DAILY (WITH DINNER)    Biguanide Agents Failed    9/30/2018  2:17 AM       Failed - Blood pressure less than 140/90 in past 6 months    BP Readings from Last 3 Encounters:   09/05/18 136/90   08/03/18 139/89   06/13/18 111/78                Failed - Patient has documented LDL within the past 12 mos.    Recent Labs   Lab Test  03/27/17   0551   LDL  77            Failed - Patient has had a Microalbumin in the past 15 mos.    No lab results found.         Failed - Patient has documented A1c within the specified period of time.    If HgbA1C is 8 or greater, it needs to be on file within the past 3 months.  If less than 8, must be on file within the past 6 months.     Recent Labs   Lab Test  09/26/17   0947   A1C  6.0            Passed - Patient is age 10 or older       Passed - Patient's CR is NOT>1.4 OR Patient's EGFR is NOT<45 within past 12 mos.    Recent Labs   Lab Test  08/03/18   0853   GFRESTIMATED  63   GFRESTBLACK  76       Recent Labs   Lab Test  08/03/18   0853   CR  1.21            Passed - Patient does NOT have a diagnosis of CHF.       Passed - Recent (6 mo) or future (30 days) visit within the authorizing provider's specialty    Patient had office visit in the last 6 months or has a visit in the next 30 days with  "authorizing provider or within the authorizing provider's specialty.  See \"Patient Info\" tab in inbasket, or \"Choose Columns\" in Meds & Orders section of the refill encounter.              "

## 2018-10-01 ENCOUNTER — OFFICE VISIT (OUTPATIENT)
Dept: INTERNAL MEDICINE | Facility: CLINIC | Age: 53
End: 2018-10-01
Payer: COMMERCIAL

## 2018-10-01 VITALS
BODY MASS INDEX: 23.3 KG/M2 | DIASTOLIC BLOOD PRESSURE: 82 MMHG | HEART RATE: 73 BPM | SYSTOLIC BLOOD PRESSURE: 130 MMHG | OXYGEN SATURATION: 98 % | WEIGHT: 127.4 LBS | TEMPERATURE: 98.3 F

## 2018-10-01 DIAGNOSIS — Z11.4 SCREENING FOR HIV (HUMAN IMMUNODEFICIENCY VIRUS): ICD-10-CM

## 2018-10-01 DIAGNOSIS — E11.9 TYPE 2 DIABETES MELLITUS WITHOUT COMPLICATION, WITHOUT LONG-TERM CURRENT USE OF INSULIN (H): Primary | ICD-10-CM

## 2018-10-01 DIAGNOSIS — R09.81 NASAL CONGESTION: ICD-10-CM

## 2018-10-01 DIAGNOSIS — Z79.899 MEDICATION MANAGEMENT: ICD-10-CM

## 2018-10-01 DIAGNOSIS — Z23 NEED FOR PROPHYLACTIC VACCINATION AND INOCULATION AGAINST INFLUENZA: ICD-10-CM

## 2018-10-01 LAB
ALBUMIN SERPL-MCNC: 4.2 G/DL (ref 3.4–5)
ALP SERPL-CCNC: 67 U/L (ref 40–150)
ALT SERPL W P-5'-P-CCNC: 34 U/L (ref 0–70)
ANION GAP SERPL CALCULATED.3IONS-SCNC: 10 MMOL/L (ref 3–14)
AST SERPL W P-5'-P-CCNC: 21 U/L (ref 0–45)
BILIRUB SERPL-MCNC: 1.4 MG/DL (ref 0.2–1.3)
BUN SERPL-MCNC: 20 MG/DL (ref 7–30)
CALCIUM SERPL-MCNC: 8.9 MG/DL (ref 8.5–10.1)
CHLORIDE SERPL-SCNC: 104 MMOL/L (ref 94–109)
CHOLEST SERPL-MCNC: 135 MG/DL
CO2 SERPL-SCNC: 26 MMOL/L (ref 20–32)
CREAT SERPL-MCNC: 1.19 MG/DL (ref 0.66–1.25)
ERYTHROCYTE [DISTWIDTH] IN BLOOD BY AUTOMATED COUNT: 14.6 % (ref 10–15)
GFR SERPL CREATININE-BSD FRML MDRD: 64 ML/MIN/1.7M2
GLUCOSE SERPL-MCNC: 114 MG/DL (ref 70–99)
HBA1C MFR BLD: 6.6 % (ref 0–5.6)
HCT VFR BLD AUTO: 46.3 % (ref 40–53)
HDLC SERPL-MCNC: 31 MG/DL
HGB BLD-MCNC: 14.9 G/DL (ref 13.3–17.7)
HIV 1+2 AB+HIV1 P24 AG SERPL QL IA: NONREACTIVE
LDLC SERPL CALC-MCNC: 74 MG/DL
MCH RBC QN AUTO: 30 PG (ref 26.5–33)
MCHC RBC AUTO-ENTMCNC: 32.2 G/DL (ref 31.5–36.5)
MCV RBC AUTO: 93 FL (ref 78–100)
NONHDLC SERPL-MCNC: 104 MG/DL
PLATELET # BLD AUTO: 202 10E9/L (ref 150–450)
POTASSIUM SERPL-SCNC: 4.1 MMOL/L (ref 3.4–5.3)
PROT SERPL-MCNC: 7.8 G/DL (ref 6.8–8.8)
RBC # BLD AUTO: 4.97 10E12/L (ref 4.4–5.9)
SODIUM SERPL-SCNC: 140 MMOL/L (ref 133–144)
TRIGL SERPL-MCNC: 148 MG/DL
WBC # BLD AUTO: 10.7 10E9/L (ref 4–11)

## 2018-10-01 PROCEDURE — 99214 OFFICE O/P EST MOD 30 MIN: CPT | Mod: 25 | Performed by: INTERNAL MEDICINE

## 2018-10-01 PROCEDURE — 80053 COMPREHEN METABOLIC PANEL: CPT | Performed by: INTERNAL MEDICINE

## 2018-10-01 PROCEDURE — 90471 IMMUNIZATION ADMIN: CPT | Performed by: INTERNAL MEDICINE

## 2018-10-01 PROCEDURE — 85027 COMPLETE CBC AUTOMATED: CPT | Performed by: INTERNAL MEDICINE

## 2018-10-01 PROCEDURE — 36415 COLL VENOUS BLD VENIPUNCTURE: CPT | Performed by: INTERNAL MEDICINE

## 2018-10-01 PROCEDURE — 80061 LIPID PANEL: CPT | Performed by: INTERNAL MEDICINE

## 2018-10-01 PROCEDURE — 90688 IIV4 VACCINE SPLT 0.5 ML IM: CPT | Performed by: INTERNAL MEDICINE

## 2018-10-01 PROCEDURE — 83036 HEMOGLOBIN GLYCOSYLATED A1C: CPT | Performed by: INTERNAL MEDICINE

## 2018-10-01 PROCEDURE — 87389 HIV-1 AG W/HIV-1&-2 AB AG IA: CPT | Performed by: INTERNAL MEDICINE

## 2018-10-01 RX ORDER — FLUTICASONE PROPIONATE 50 MCG
2 SPRAY, SUSPENSION (ML) NASAL DAILY
Qty: 3 BOTTLE | Refills: 3 | Status: SHIPPED | OUTPATIENT
Start: 2018-10-01 | End: 2019-04-02

## 2018-10-01 NOTE — PATIENT INSTRUCTIONS
Flu shot today.     Fasting labs today.    Follow-up recommendations to follow.    ---    STOP clopidogrel/Plavix. Don't restart.    TRIAL of STOPPING pantoprazole. If recurrent heart burn or indigestion, may restart.    TRIAL of STOPPING finasteride/Proscar. If recurrent urinary symptoms, may restart.    ---    START Flonase nasal spray - 2 sprays/nostril DAILY and consistently.

## 2018-10-01 NOTE — MR AVS SNAPSHOT
After Visit Summary   10/1/2018    Luke Henao    MRN: [id number]           Patient Information     Date Of Birth          1965        Visit Information        Provider Department      10/1/2018 8:45 AM Shanna Church MD; [employer] [facility]        Today's Diagnoses     Nasal congestion    -  1    Screening for HIV (human immunodeficiency virus)        Need for prophylactic vaccination and inoculation against influenza        Type 2 diabetes mellitus without complication, without long-term current use of insulin (H)          Care Instructions    Flu shot today.     Fasting labs today.    Follow-up recommendations to follow.    ---    STOP clopidogrel/Plavix. Don't restart.    TRIAL of STOPPING pantoprazole. If recurrent heart burn or indigestion, may restart.    TRIAL of STOPPING finasteride/Proscar. If recurrent urinary symptoms, may restart.    ---    START Flonase nasal spray - 2 sprays/nostril DAILY and consistently.               Follow-ups after your visit        Additional Services     OPHTHALMOLOGY ADULT REFERRAL       Your provider has referred you to: N: [facility] - [place] [phone] [email or web address]    Please be aware that coverage of these services is subject to the terms and limitations of your health insurance plan.  Call member services at your health plan with any benefit or coverage questions.      Please bring the following with you to your appointment:    (1) Any X-Rays, CTs or MRIs which have been performed.  Contact the facility where they were done to arrange for  prior to your scheduled appointment.    (2) List of current medications  (3) This referral request   (4) Any documents/labs given to you for this referral                  Your next 10 appointments already scheduled     Oct 15, 2018 10:00 AM CDT   (Arrive by 9:45 AM)   Implanted Defibulator with Uc Cv Device 1   M  [facility] ([facility])    [address] [place] [address]  3rd Floor  [id number]               Oct 15, 2018 10:30 AM CDT   (Arrive by 10:15 AM)   RETURN ARRHYTHMIA with RUBI Murphy CNP   [facility] ([facility])    [address] [place] [address]  [address] [place] [id number] [phone]            Oct 15, 2018 11:00 AM CDT   Lab with [facility]    [facility] ([facility])    [address] [place] [address]  1st Floor  [facility] [address]   [phone]            Oct 15, 2018 11:30 AM CDT   (Arrive by 11:15 AM)   CORE RETURN with Emily Collado NP   [facility] ([facility])    [address] [place] [address]  [address] [place] [id number] [phone]            Apr 03, 2019  9:30 AM CDT   Ech Complete with MIS   [facility] ([facility])    [address] [place] [address]  3rd [facility] [address] [phone]           1.  Please bring or wear a comfortable two-piece outfit. 2.  You may eat, drink and take your normal medicines. 3.  For any questions that cannot be answered, please contact the ordering physician 4.  Please do not wear perfumes or scented lotions on the day of your exam.            Apr 03, 2019 10:00 AM CDT   (Arrive by 9:45 AM)   Return Visit with Amanda Amaral MD   [facility] ([facility])    [address] [place] [address]  [address] [place] [id number] [phone]              Who to contact     If you have questions or need follow up information about today's clinic visit or your schedule please contact [facility] directly at [phone].  Normal or non-critical lab and imaging results will be communicated to you by MyChart, letter or phone within 4 business days after the clinic has received the results. If you do not hear from us within 7 days, please contact the clinic  "through [employer] or phone. If you have a critical or abnormal lab result, we will notify you by phone as soon as possible.  Submit refill requests through [employer] or call your pharmacy and they will forward the refill request to us. Please allow 3 business days for your refill to be completed.          Additional Information About Your Visit        [employer] Information     [employer] lets you send messages to your doctor, view your test results, renew your prescriptions, schedule appointments and more. To sign up, go to [email or web address] . Click on \"Log in\" on the left side of the screen, which will take you to the Welcome page. Then click on \"Sign up Now\" on the right side of the page.     You will be asked to enter the access code listed below, as well as some personal information. Please follow the directions to create your username and password.     Your access code is: [email or web address]  Expires: 2018  6:31 AM     Your access code will  in 90 days. If you need help or a new code, please call your [place] clinic or [phone].        Care EveryWhere ID     This is your Care EveryWhere ID. This could be used by other organizations to access your [place] medical records  [id number]        Your Vitals Were     Pulse Temperature Pulse Oximetry BMI (Body Mass Index)          73 98.3  F (36.8  C) (Oral) 98% 23.3 kg/m2         Blood Pressure from Last 3 Encounters:   10/01/18 130/82   18 136/90   18 139/89    Weight from Last 3 Encounters:   10/01/18 127 lb 6.4 oz (57.8 kg)   18 131 lb (59.4 kg)   18 128 lb 1.6 oz (58.1 kg)              We Performed the Following     CBC with platelets     Comprehensive metabolic panel     FLU VACCINE, IM (QUADRIVALENT W/PRESERVATIVES/MULTI-DOSE) [99161]- >3 YRS     Hemoglobin A1c     HIV Antigen Antibody Combo     Lipid Profile     OPHTHALMOLOGY ADULT REFERRAL     Vaccine Administration, Initial [[id number]]          Today's Medication Changes        "   These changes are accurate as of 10/1/18  9:39 AM.  If you have any questions, ask your nurse or doctor.               Start taking these medicines.        Dose/Directions    fluticasone 50 MCG/ACT spray   Commonly known as:  FLONASE   Used for:  Nasal congestion        Dose:  2 spray   Spray 2 sprays into both nostrils daily   Quantity:  3 Bottle   Refills:  3            Where to get your medicines      These medications were sent to [facility]/pharmacy #[id number] [facility] [address] [address]     Phone:  [phone]     fluticasone 50 MCG/ACT spray                Primary Care Provider Office Phone # Fax #    Shanna Church [phone]       [address] [place] [address]        Equal Access to Services     CUBA AYALA : Hadii linda el hadasho Soomaali, waaxda luqadaha, qaybta kaalmada adeegyada, karen nettles . So [facility] [phone].    ATENCIÓN: Si habla español, tiene a suarez disposición servicios gratuitos de asistencia lingüística. Llame al [phone].    We comply with applicable federal civil rights laws and [place] laws. We do not discriminate on the basis of race, color, national origin, age, disability, sex, sexual orientation, or gender identity.            Thank you!     Thank you for choosing [facility]  for your care. Our goal is always to provide you with excellent care. Hearing back from our patients is one way we can continue to improve our services. Please take a few minutes to complete the written survey that you may receive in the mail after your visit with us. Thank you!             Your Updated Medication List - Protect others around you: Learn how to safely use, store and throw away your medicines at www.disposemymeds.org.          This list is accurate as of 10/1/18  9:39 AM.  Always use your most recent med list.                   Brand Name Dispense Instructions for use  Diagnosis    aspirin 81 MG chewable tablet     90 tablet    Take 1 tablet (81 mg) by mouth daily    Coronary artery disease involving native coronary artery of native heart without angina pectoris       atorvastatin 40 MG tablet    LIPITOR    60 tablet    1 TABLET (40 MG) BY ORAL OR FEEDING TUBE ROUTE DAILY    Coronary artery disease involving native coronary artery of native heart without angina pectoris, Cardiogenic shock (H)       bumetanide 0.5 MG tablet    BUMEX    45 tablet    Take 1 tablet (0.5 mg) by mouth every other day    Ischemic cardiomyopathy       cetirizine 10 MG tablet    zyrTEC    60 tablet    TAKE 1 TABLET (10 MG) BY MOUTH DAILY    Environmental allergies       digoxin 125 MCG tablet    LANOXIN    60 tablet    Take one tablet on M, W, F, Sunday.    Coronary artery disease involving native coronary artery of native heart without angina pectoris       fluticasone 50 MCG/ACT spray    FLONASE    3 Bottle    Spray 2 sprays into both nostrils daily    Nasal congestion       glipiZIDE 5 MG tablet    GLUCOTROL    180 tablet    Take 1 tablet (5 mg) by mouth 2 times daily (before meals)    Type 2 diabetes mellitus without complication, without long-term current use of insulin (H)       insulin pen needle 32G X 4 MM    BD KYLEE U/F    100 each    Use 3 daily or as directed.    Diabetes mellitus type 2, insulin dependent (H)       lisinopril 2.5 MG tablet    PRINIVIL/Zestril    90 tablet    Take 2.5 mg in the am and 5 mg in the evening.    Ischemic cardiomyopathy       metFORMIN 500 MG 24 hr tablet    GLUCOPHAGE-XR    180 tablet    Take 1 tablet (500 mg) by mouth daily (with dinner)    Type 2 diabetes mellitus without complication, without long-term current use of insulin (H)       metoprolol succinate 50 MG 24 hr tablet    TOPROL-XL    180 tablet    Take 3 tablets (150 mg) by mouth daily    Chronic systolic congestive heart failure (H)       ONETOUCH ULTRA test strip   Generic drug:  blood glucose monitoring      100 strip    USE TO TEST BLOOD SUGAR 3 TIMES DAILY OR AS DIRECTED.    Diabetes mellitus type 2, insulin dependent (H)       potassium chloride SA 10 MEQ CR tablet    K-DUR/KLOR-CON M    30 tablet    Take 1 pill every other day when you take bumex.    Chronic systolic congestive heart failure (H)

## 2018-10-01 NOTE — PROGRESS NOTES
SUBJECTIVE:                                                      HPI: Luke Henao is a pleasant 53 year old male who presents for diabetes follow-up:     utilized.    DIABETES MANAGEMENT                                                      Current DM regimen: metformin  mg daily +  glipizide 5 mg twice daily  Adherent to regimen: yes  Adherent to ADA diet: yes    Ophthalmology: DUE  Retinopathy: no  Neuropathy: denies  Checking feet/seeing podiatry: yes  Influenza vaccine: DUE  Pneumococcal vaccine: up to date     Aspirin Rx: on daily baby aspirin  ACE-I/ARB: on lisinopril 2.5/5 mg (bid dosing per cardiology)   Statin: on atorvastatin 40 mg daily    Last HgbA1c: 6.0% (9/26/17)  Last LDL: 77 (3/27/17)    Blood pressure < 130/80: borderline    ---    Patient is also interested in reducing the number of medications that he is on.    Reviewed medications with patient:     On Lipitor, aspirin, Toprol, lisinopril, and digoxin for history of CAD and ischemic cardiomyopathy. Do not recommend discontinuing any of these.     On metformin XR and glipizide for diabetes. Do not recommend discontinuing any of these.    On finasteride for BPH. Do not recommend discontinuing this, but patient would like to try.    On pantoprazole for acid reflux. Recommend trial of discontinuing this.    The medication, allergy, and problem lists have been reviewed and updated as appropriate.     Patient was confused about whether or not he should be on Plavix. On review of recent cardiology note, cardiologist believed that he was already off of Plavix and on daily baby aspirin at the time of that visit.  No change/restarting of Plavix recommended.    ---    Patient also complains of chronic nasal congestion, worse at night.    Has known seasonal allergies, on daily Zyrtec.    Has been on Flonase in the past with improvement of symptoms. Interested in retrying.    ---    Unrelated to above, patient is DUE for HIV  screening.    ---    OBJECTIVE:                                                      /82  Pulse 73  Temp 98.3  F (36.8  C) (Oral)  Wt 127 lb 6.4 oz (57.8 kg)  SpO2 98%  BMI 23.3 kg/m2  Constitutional: well-appearing  Respiratory: normal respiratory effort; clear to auscultation bilaterally  Cardiovascular: regular rate and rhythm; no edema  Gastrointestinal: soft, non-tender, non-distended, and bowel sounds present; no organomegaly or masses   Musculoskeletal: normal gait and station; foot exam: no lesions or ulcers, sensation intact to monofilament  Psych: normal judgment and insight; normal mood and affect; recent and remote memory intact      ASSESSMENT/PLAN:                                                      (E11.9) Type 2 diabetes mellitus without complication, without long-term current use of insulin (H)  (primary encounter diagnosis)  Plan:    - CBC, CMP, fasting lipid profile, and HgbA1c today.   - ophthalmology referral placed-patient to schedule.   - recommendations re: follow-up to follow.     (Z11.4) Screening for HIV (human immunodeficiency virus)  Plan: HIV screen today.    (Z23) Need for prophylactic vaccination and inoculation against influenza  Plan: flu shot given today.    (R09.81) Nasal congestion  Plan: Flonase 2 sprays/nostril daily.    (Z79.899) Medication management  Plan:    - clarified with patient that he should be OFF OF Plavix and on daily baby aspirin.   - TRIAL of stopping pantoprazole; if recurrent acid reflux symptoms, restart.   - TRIAL of stopping finasteride; if recurrent urinary symptoms, restart.    The instructions on the AVS were discussed and explained to the patient. Patient expressed understanding of instructions.    A total of 25 minutes were spent face-to-face with this patient during this encounter and over half of that time was spent on counseling and coordination of care re: above diagnoses and plans of care.     (Chart documentation was completed, in  part, with Dragon voice-recognition software. Even though reviewed, some grammatical, spelling, and word errors may remain.)    Shanna Church MD   [address] [address]  T: [phone], F: [phone]

## 2018-10-02 RX ORDER — METFORMIN HCL 500 MG
TABLET, EXTENDED RELEASE 24 HR ORAL
Qty: 180 TABLET | Refills: 3 | OUTPATIENT
Start: 2018-10-02

## 2018-10-12 ENCOUNTER — PRE VISIT (OUTPATIENT)
Dept: CARDIOLOGY | Facility: CLINIC | Age: 53
End: 2018-10-12

## 2018-10-12 DIAGNOSIS — I50.22 CHRONIC SYSTOLIC HEART FAILURE (H): Primary | ICD-10-CM

## 2018-10-15 ENCOUNTER — OFFICE VISIT (OUTPATIENT)
Dept: CARDIOLOGY | Facility: CLINIC | Age: 53
End: 2018-10-15
Attending: NURSE PRACTITIONER
Payer: COMMERCIAL

## 2018-10-15 ENCOUNTER — ALLIED HEALTH/NURSE VISIT (OUTPATIENT)
Dept: CARDIOLOGY | Facility: CLINIC | Age: 53
End: 2018-10-15
Attending: INTERNAL MEDICINE
Payer: COMMERCIAL

## 2018-10-15 VITALS
HEIGHT: 62 IN | HEART RATE: 69 BPM | OXYGEN SATURATION: 98 % | WEIGHT: 128.6 LBS | SYSTOLIC BLOOD PRESSURE: 147 MMHG | BODY MASS INDEX: 23.66 KG/M2 | DIASTOLIC BLOOD PRESSURE: 95 MMHG

## 2018-10-15 VITALS
SYSTOLIC BLOOD PRESSURE: 147 MMHG | OXYGEN SATURATION: 98 % | BODY MASS INDEX: 23.51 KG/M2 | DIASTOLIC BLOOD PRESSURE: 95 MMHG | HEART RATE: 69 BPM | WEIGHT: 128.53 LBS

## 2018-10-15 DIAGNOSIS — I34.0 NON-RHEUMATIC MITRAL REGURGITATION: ICD-10-CM

## 2018-10-15 DIAGNOSIS — I50.22 CHRONIC SYSTOLIC HEART FAILURE (H): ICD-10-CM

## 2018-10-15 DIAGNOSIS — I25.5 ISCHEMIC CARDIOMYOPATHY: ICD-10-CM

## 2018-10-15 DIAGNOSIS — I25.10 CORONARY ARTERY DISEASE INVOLVING NATIVE CORONARY ARTERY OF NATIVE HEART WITHOUT ANGINA PECTORIS: ICD-10-CM

## 2018-10-15 DIAGNOSIS — Z95.810 ICD (IMPLANTABLE CARDIOVERTER-DEFIBRILLATOR) IN PLACE: Primary | ICD-10-CM

## 2018-10-15 LAB
ANION GAP SERPL CALCULATED.3IONS-SCNC: 9 MMOL/L (ref 3–14)
BUN SERPL-MCNC: 28 MG/DL (ref 7–30)
CALCIUM SERPL-MCNC: 8.6 MG/DL (ref 8.5–10.1)
CHLORIDE SERPL-SCNC: 108 MMOL/L (ref 94–109)
CO2 SERPL-SCNC: 24 MMOL/L (ref 20–32)
CREAT SERPL-MCNC: 1.19 MG/DL (ref 0.66–1.25)
GFR SERPL CREATININE-BSD FRML MDRD: 64 ML/MIN/1.7M2
GLUCOSE SERPL-MCNC: 113 MG/DL (ref 70–99)
POTASSIUM SERPL-SCNC: 4.6 MMOL/L (ref 3.4–5.3)
SODIUM SERPL-SCNC: 140 MMOL/L (ref 133–144)

## 2018-10-15 PROCEDURE — 36415 COLL VENOUS BLD VENIPUNCTURE: CPT | Performed by: NURSE PRACTITIONER

## 2018-10-15 PROCEDURE — T1013 SIGN LANG/ORAL INTERPRETER: HCPCS | Mod: U3,ZF

## 2018-10-15 PROCEDURE — 99214 OFFICE O/P EST MOD 30 MIN: CPT | Mod: ZP | Performed by: NURSE PRACTITIONER

## 2018-10-15 PROCEDURE — G0463 HOSPITAL OUTPT CLINIC VISIT: HCPCS | Mod: ZF

## 2018-10-15 PROCEDURE — 80048 BASIC METABOLIC PNL TOTAL CA: CPT | Performed by: NURSE PRACTITIONER

## 2018-10-15 RX ORDER — LISINOPRIL 2.5 MG/1
5 TABLET ORAL 2 TIMES DAILY
Qty: 90 TABLET | Refills: 11 | Status: SHIPPED | OUTPATIENT
Start: 2018-10-15 | End: 2018-11-06

## 2018-10-15 ASSESSMENT — PAIN SCALES - GENERAL
PAINLEVEL: NO PAIN (0)
PAINLEVEL: NO PAIN (0)

## 2018-10-15 NOTE — LETTER
10/15/2018      RE: Luke Henao  [address]  [facility] [address]       Dear Colleague,    Thank you for the opportunity to participate in the care of your patient, Luke Henao, at the [facility]. Please see a copy of my visit note below.    CARDIOLOGY CLINIC  Luke Henao is a 53 year old male with with ischemic cardiomyopathy, CAD with an RCA STEMI s/p multiple stents in March 2017 complicated by PEDRO, sepsis, sacral ulcer, DVT, bilateral hemispheric infarcts secondary to watershed ischemia and cardiogenic shock with IABP who underwent successful mitral clip in May 2017 and AICD in October 2017 who presents to CORE clinic for follow up. His visit is obtained via professional in person interpretor.     He continues to do well from a functional standpoint. He denies any symptoms of chest pain, palpitations, orthopnea, cough, PND, dyspnea or lightheadedness. He does express fatigue after working for 10 hours, especially noted in his legs feeling weak and shaky. Also, his legs feel a little tight but it resolves by morning. He feels his weight is stable.     His medication titration has been previously limited by worsening renal function, though thus far has been managing on current dose. There was talk previously of switching to Entresto. He continue on Bumex only 0.5mg every other day with small dose of potassium only on those days. He denies any early satiety or abdominal bloating.     He say EP earlier today and they had no concerns.     PAST MEDICAL HISTORY:  Past Medical History:   Diagnosis Date     Acne      CAD (coronary artery disease) 2017    RCA STEMI s/p balloon angioplasty and multiple Sofie to RCA, LCx, and LAD     Cardiogenic shock (H)      DVT (deep venous thrombosis) (H) 2017    left internal jugular (line-associated); left common femoral; on coumadin     Ischemic cardiomyopathy 2017    EF 30-35%     Ischemic stroke (H) 2017    no  residual deficits     Lower GI bleed 2017    due to rectal ulcer     Mitral valve insufficiency, ischemic 2017    s/p Mitral Clip placement      Skin ulcer of sacrum (H)      Systolic heart failure (H)      Type 2 diabetes mellitus (H)        FAMILY HISTORY:  Family History   Problem Relation Age of Onset     Hypertension Mother      Type 2 Diabetes Father      Hypertension Father      Myocardial Infarction No family hx of      Cerebrovascular Disease No family hx of      Coronary Artery Disease Early Onset No family hx of      Colon Cancer No family hx of      Prostate Cancer No family hx of        SOCIAL HISTORY:  Social History     Social History     Marital status:      Spouse name: N/A     Number of children: N/A     Years of education: N/A     Occupational History     H[employer]      Social History Main Topics     Smoking status: Former Smoker     Packs/day: 0.50     Years: 30.00     Types: Cigarettes     Quit date: 1/1/2017     Smokeless tobacco: Never Used     Alcohol use No     Drug use: No     Sexual activity: Not Currently     Other Topics Concern     Not on file     Social History Narrative    . Remarried.    4 children with first marriage.    2 grand children.    Walks daily, but no formal exercise.            CURRENT MEDICATIONS:  Outpatient Medications Prior to Visit   Medication Sig Dispense Refill     aspirin 81 MG chewable tablet Take 1 tablet (81 mg) by mouth daily 90 tablet 3     atorvastatin (LIPITOR) 40 MG tablet 1 TABLET (40 MG) BY ORAL OR FEEDING TUBE ROUTE DAILY 60 tablet 0     bumetanide (BUMEX) 0.5 MG tablet Take 1 tablet (0.5 mg) by mouth every other day 45 tablet 3     cetirizine (ZYRTEC) 10 MG tablet TAKE 1 TABLET (10 MG) BY MOUTH DAILY 60 tablet 6     digoxin (LANOXIN) 125 MCG tablet Take one tablet on M, W, F, Sunday. 60 tablet 5     fluticasone (FLONASE) 50 MCG/ACT spray Spray 2 sprays into both nostrils daily 3 Bottle 3     glipiZIDE (GLUCOTROL) 5 MG tablet  Take 1 tablet (5 mg) by mouth 2 times daily (before meals) 180 tablet 3     insulin pen needle (BD KYLEE U/F) 32G X 4 MM Use 3 daily or as directed. 100 each prn     lisinopril (PRINIVIL/ZESTRIL) 2.5 MG tablet Take 2.5 mg in the am and 5 mg in the evening. 90 tablet 11     metFORMIN (GLUCOPHAGE-XR) 500 MG 24 hr tablet Take 1 tablet (500 mg) by mouth daily (with dinner) 180 tablet 3     metoprolol succinate (TOPROL-XL) 50 MG 24 hr tablet Take 3 tablets (150 mg) by mouth daily 180 tablet 3     potassium chloride SA (K-DUR/KLOR-CON M) 10 MEQ CR tablet Take 1 pill every other day when you take bumex. 30 tablet 3     ONETOUCH ULTRA test strip USE TO TEST BLOOD SUGAR 3 TIMES DAILY OR AS DIRECTED. (Patient not taking: Reported on 10/15/2018) 100 strip 3     No facility-administered medications prior to visit.      EXAM:  BP (!) 147/95  Pulse 69  Wt 58.3 kg (128 lb 8.5 oz)  SpO2 98%  BMI 23.51 kg/m2  General: seated in chair, in NAD  HEENT: NC/AT, sclera anicteric, MMM    Card: regular rhythm, normal S1/S2, no murmur, gallop, rub. JVP refluxes to 12cm  Pulm: CTA B, no rales, ronchi, or wheezing.   Abdomen: ND, +BS, soft, NT  Extremities: no pitting edema, 2+ PT pulses bilateral  Neurological: alert, conversant, moving all extremities equally  Skin:  No ecchymoses, rashes, or clubbing.  Psych: pleasant mood and appropriate affect.    Labs:  CMP RESULTS:  Lab Results   Component Value Date     10/15/2018    POTASSIUM 4.6 10/15/2018    CHLORIDE 108 10/15/2018    CO2 24 10/15/2018    ANIONGAP 9 10/15/2018     (H) 10/15/2018    BUN 28 10/15/2018    CR 1.19 10/15/2018    GFRESTIMATED 64 10/15/2018    GFRESTBLACK 78 10/15/2018    ROB 8.6 10/15/2018    BILITOTAL 1.4 (H) 10/01/2018    ALBUMIN 4.2 10/01/2018    ALKPHOS 67 10/01/2018    ALT 34 10/01/2018    AST 21 10/01/2018        DATA:  Device interrogation today: NSR 66, OptiVol slightly above baseline.     Assessment and Plan:   Luke Henao is a pleasant 53 year old  male with a past medical history including chronic systolic heart failure who presents to CORE clinic today for follow up. He does have mildly hypervolemia on exam today. I would like to work towards switching to Entresto though his potassium is on high side today. I will increase diuresis for a few days, stop his oral potassium supplement and increase his lisinopril to 5mg twice daily. If repeat labs in 7-10 days show potassium closer to 4.0 and Creat unchanged then would switch to Entresto.     1. Chronic systolic heart failure secondary to ischemic cardiomyopathy. Stage C NYHA Class III  Fluid status hypervolemic  ACEi/ARB yes, increase to lisinopril 5mg BID as above  BB yes, continue Toprol XL 150mg daily  Aldosterone antagonist no- did not tolerate previously with worsening renal function  SCD prophylaxis +AICD  % BiV pacing: NA    2. Hypertension  Blood pressure mildly elevated today, could be secondary to volume. Increasing lisinopril for above should help- will follow.     Follow-up: BMP in 7-10 days. CORE in 1 month with Vandana, his usual provider.       Lorraine Cleveland PA-C  [facility]  Pager [phone]

## 2018-10-15 NOTE — PATIENT INSTRUCTIONS
It was a pleasure to see you in clinic today.  Please do not hesitate to call with any questions or concerns.  You are scheduled for a remote transmission on 1/21/19.  We look forward to seeing you in clinic at your next device check in 6 months.    Sade Artis, RN, MS, CCRN  Electrophysiology Nurse Clinician  [facility]    During Business Hours Please Call:  [phone]  After Hours Please Call:  [phone] - select option #4 and ask for job code [phone]

## 2018-10-15 NOTE — PROGRESS NOTES
Preliminary Device Interrogation Results.  Final physician signed paceart report to be scanned and attached.    Patient seen in clinic for evaluation and iterative programming of his Medtronic single lead ICD per MD orders.  Patient has an appointment to see Viki Grant NP today.  Normal ICD function.  No arrhythmias recorded.  Intrinsic rhythm = NSR @ 66 bpm.   = <0.1%.  OptiVol fluid index is slightly above baseline.  Estimated battery longevity to MOMO = 11.1 years.  No short v-v intervals recorded.  RV lead impedance trends appear stable.  Patient reports that he is feeling well and denies complaints.  Plan for patient to send a remote transmission in 3 months and return to clinic in 6 months.    Single lead ICD

## 2018-10-15 NOTE — NURSING NOTE
Chief Complaint   Patient presents with     Follow Up For     Return EP. No CMA tasks.     Vitals were taken and medications were reconciled.     Delaney Soria,RMA  10:38 AM

## 2018-10-15 NOTE — PROGRESS NOTES
CARDIOLOGY CLINIC  Luke Henao is a 53 year old male with with ischemic cardiomyopathy, CAD with an RCA STEMI s/p multiple stents in March 2017 complicated by PEDRO, sepsis, sacral ulcer, DVT, bilateral hemispheric infarcts secondary to watershed ischemia and cardiogenic shock with IABP who underwent successful mitral clip in May 2017 and AICD in October 2017 who presents to CORE clinic for follow up. His visit is obtained via professional in person interpretor.     He continues to do well from a functional standpoint. He denies any symptoms of chest pain, palpitations, orthopnea, cough, PND, dyspnea or lightheadedness. He does express fatigue after working for 10 hours, especially noted in his legs feeling weak and shaky. Also, his legs feel a little tight but it resolves by morning. He feels his weight is stable.     His medication titration has been previously limited by worsening renal function, though thus far has been managing on current dose. There was talk previously of switching to Entresto. He continue on Bumex only 0.5mg every other day with small dose of potassium only on those days. He denies any early satiety or abdominal bloating.     He say EP earlier today and they had no concerns.     PAST MEDICAL HISTORY:  Past Medical History:   Diagnosis Date     Acne      CAD (coronary artery disease) 2017    RCA STEMI s/p balloon angioplasty and multiple Sofie to RCA, LCx, and LAD     Cardiogenic shock (H)      DVT (deep venous thrombosis) (H) 2017    left internal jugular (line-associated); left common femoral; on coumadin     Ischemic cardiomyopathy 2017    EF 30-35%     Ischemic stroke (H) 2017    no residual deficits     Lower GI bleed 2017    due to rectal ulcer     Mitral valve insufficiency, ischemic 2017    s/p Mitral Clip placement      Skin ulcer of sacrum (H)      Systolic heart failure (H)      Type 2 diabetes mellitus (H)        FAMILY HISTORY:  Family History   Problem Relation Age of Onset      Hypertension Mother      Type 2 Diabetes Father      Hypertension Father      Myocardial Infarction No family hx of      Cerebrovascular Disease No family hx of      Coronary Artery Disease Early Onset No family hx of      Colon Cancer No family hx of      Prostate Cancer No family hx of        SOCIAL HISTORY:  Social History     Social History     Marital status:      Spouse name: N/A     Number of children: N/A     Years of education: N/A     Occupational History     [employer]      Social History Main Topics     Smoking status: Former Smoker     Packs/day: 0.50     Years: 30.00     Types: Cigarettes     Quit date: 1/1/2017     Smokeless tobacco: Never Used     Alcohol use No     Drug use: No     Sexual activity: Not Currently     Other Topics Concern     Not on file     Social History Narrative    . Remarried.    4 children with first marriage.    2 grand children.    Walks daily, but no formal exercise.            CURRENT MEDICATIONS:  Outpatient Medications Prior to Visit   Medication Sig Dispense Refill     aspirin 81 MG chewable tablet Take 1 tablet (81 mg) by mouth daily 90 tablet 3     atorvastatin (LIPITOR) 40 MG tablet 1 TABLET (40 MG) BY ORAL OR FEEDING TUBE ROUTE DAILY 60 tablet 0     bumetanide (BUMEX) 0.5 MG tablet Take 1 tablet (0.5 mg) by mouth every other day 45 tablet 3     cetirizine (ZYRTEC) 10 MG tablet TAKE 1 TABLET (10 MG) BY MOUTH DAILY 60 tablet 6     digoxin (LANOXIN) 125 MCG tablet Take one tablet on M, W, F, Sunday. 60 tablet 5     fluticasone (FLONASE) 50 MCG/ACT spray Spray 2 sprays into both nostrils daily 3 Bottle 3     glipiZIDE (GLUCOTROL) 5 MG tablet Take 1 tablet (5 mg) by mouth 2 times daily (before meals) 180 tablet 3     insulin pen needle (BD KYLEE U/F) 32G X 4 MM Use 3 daily or as directed. 100 each prn     lisinopril (PRINIVIL/ZESTRIL) 2.5 MG tablet Take 2.5 mg in the am and 5 mg in the evening. 90 tablet 11     metFORMIN (GLUCOPHAGE-XR) 500 MG 24 hr  tablet Take 1 tablet (500 mg) by mouth daily (with dinner) 180 tablet 3     metoprolol succinate (TOPROL-XL) 50 MG 24 hr tablet Take 3 tablets (150 mg) by mouth daily 180 tablet 3     potassium chloride SA (K-DUR/KLOR-CON M) 10 MEQ CR tablet Take 1 pill every other day when you take bumex. 30 tablet 3     ONETOUCH ULTRA test strip USE TO TEST BLOOD SUGAR 3 TIMES DAILY OR AS DIRECTED. (Patient not taking: Reported on 10/15/2018) 100 strip 3     No facility-administered medications prior to visit.        ROS:  No fever or chills  No cough or dyspnea  No chest pain or palpitations  No LH/dizzy, headaches, syncope  Legs tired as above, no claudication, no cramps, no focal weakness    EXAM:  BP (!) 147/95  Pulse 69  Wt 58.3 kg (128 lb 8.5 oz)  SpO2 98%  BMI 23.51 kg/m2  General: seated in chair, in NAD  HEENT: NC/AT, sclera anicteric, MMM    Card: regular rhythm, normal S1/S2, no murmur, gallop, rub. JVP refluxes to 12cm  Pulm: CTA B, no rales, ronchi, or wheezing.   Abdomen: ND, +BS, soft, NT  Extremities: no pitting edema, 2+ PT pulses bilateral  Neurological: alert, conversant, moving all extremities equally  Skin:  No ecchymoses, rashes, or clubbing.  Psych: pleasant mood and appropriate affect.    Labs:  CMP RESULTS:  Lab Results   Component Value Date     10/15/2018    POTASSIUM 4.6 10/15/2018    CHLORIDE 108 10/15/2018    CO2 24 10/15/2018    ANIONGAP 9 10/15/2018     (H) 10/15/2018    BUN 28 10/15/2018    CR 1.19 10/15/2018    GFRESTIMATED 64 10/15/2018    GFRESTBLACK 78 10/15/2018    ROB 8.6 10/15/2018    BILITOTAL 1.4 (H) 10/01/2018    ALBUMIN 4.2 10/01/2018    ALKPHOS 67 10/01/2018    ALT 34 10/01/2018    AST 21 10/01/2018        DATA:  Device interrogation today: NSR 66, OptiVol slightly above baseline.     Assessment and Plan:   Luke Henao is a pleasant 53 year old male with a past medical history including chronic systolic heart failure who presents to CORE clinic today for follow up. He  does have mildly hypervolemia on exam today. I would like to work towards switching to Entresto though his potassium is on high side today. I will increase diuresis for a few days, stop his oral potassium supplement and increase his lisinopril to 5mg twice daily. If repeat labs in 7-10 days show potassium closer to 4.0 and Creat unchanged then would switch to Entresto.     1. Chronic systolic heart failure secondary to ischemic cardiomyopathy. Stage C NYHA Class III  Fluid status hypervolemic  ACEi/ARB yes, increase to lisinopril 5mg BID as above  BB yes, continue Toprol XL 150mg daily  Aldosterone antagonist no- did not tolerate previously with worsening renal function  SCD prophylaxis +AICD  % BiV pacing: NA    2. Hypertension  Blood pressure mildly elevated today, could be secondary to volume. Increasing lisinopril for above should help- will follow.     Follow-up: BMP in 7-10 days. CORE in 1 month with Vandana, his usual provider.       Lorraine Cleveland PA-C  [facility]  Pager [phone]

## 2018-10-15 NOTE — NURSING NOTE
Diet: Patient instructed regarding a heart healthy diet, including discussion of reduced fat and sodium intake. Patient demonstrated understanding of this information and agreed to call with further questions or concerns.  Labs: Patient was given results of the laboratory testing obtained today. Patient was instructed to return for the next laboratory testing in 7-10 days. Patient demonstrated understanding of this information and agreed to call with further questions or concerns.   Return Appointment: Patient given instructions regarding scheduling next clinic visit. Patient demonstrated understanding of this information and agreed to call with further questions or concerns.  Medication Change: Patient was educated regarding prescribed medication change, including discussion of the indication, administration, side effects, and when to report to MD or RN. Patient demonstrated understanding of this information and agreed to call with further questions or concerns.  Patient stated he understood all health information given and agreed to call with further questions or concerns. Opal Benitez RN CORE Care Coordinator

## 2018-10-15 NOTE — MR AVS SNAPSHOT
After Visit Summary   10/15/2018    Luke Henao    MRN: [id number]           Patient Information     Date Of Birth          1965        Visit Information        Provider Department      10/15/2018 11:45 AM Kellie Cabrera Barbara A, PA-C [facility]        Today's Diagnoses     Ischemic cardiomyopathy          Care Instructions    Cardiology Providers you saw during your visit:  Michelle Cleveland NP    Results for LUKE HENAO (MRN [id number]) as of 10/15/2018 13:04   Ref. Range 10/15/2018 11:17   Sodium Latest Ref Range: 133 - 144 mmol/L 140   Potassium Latest Ref Range: 3.4 - 5.3 mmol/L 4.6   Chloride Latest Ref Range: 94 - 109 mmol/L 108   Carbon Dioxide Latest Ref Range: 20 - 32 mmol/L 24   Urea Nitrogen Latest Ref Range: 7 - 30 mg/dL 28   Creatinine Latest Ref Range: 0.66 - 1.25 mg/dL 1.19   GFR Estimate Latest Ref Range: >60 mL/min/1.7m2 64   GFR Estimate If Black Latest Ref Range: >60 mL/min/1.7m2 78   Calcium Latest Ref Range: 8.5 - 10.1 mg/dL 8.6   Anion Gap Latest Ref Range: 3 - 14 mmol/L 9   Glucose Latest Ref Range: 70 - 99 mg/dL 113 (H)       Medication changes:  INCREASE Lisinopril to 5 mg (one tablet) twice a day.   STOP taking potassium.   INCREASE Bumex to 0.5 mg (one tablet) daily for the next 3 days. Then return to 0.5mg (one tablet) every other day.     Follow up:    Please return to clinic for follow-up:  CORE follow-up  with Vandana Zambrano NP in 1 month.     Return for lab work in 7-10 days.       Please call if you have:  1. Weight gain of more than 2 pounds in a day or 5 pounds in a week  2. Increased shortness of breath, swelling or bloating  3. Dizziness, lightheadedness   4. Any questions or concerns.     Follow the American Heart Association Diet and Lifestyle recommendations:  Limit saturated fat, trans fat, sodium, red meat, sweets and sugar-sweetened beverages. If you choose to eat red meat, compare labels and select the leanest cuts available.  Aim  for at least 150 minutes of moderate physical activity or 75 minutes of vigorous physical activity - or an equal combination of both - each week.    During business hours: [phone], press option # 1 to be routed to the [facility] then option # 3 for medical questions to speak with a nurse.     After hours, weekends or holidays: On Call Cardiologist- [phone]   option #4 and ask to speak to the on-call Cardiologist. Inform them you are a CORE/heart failure patient at the [place] [facility].      Opal Benitez, RN  Cardiology Nurse Care Coordinator    Keep up the good work!    Take Care!            Follow-ups after your visit        Additional Services     Follow-Up with CORE Clinic       Vandana in 1 month                  Your next 10 appointments already scheduled     Oct 22, 2018  9:15 AM CDT   LAB with [facility]   [facility]    [address] [address] [phone]           Please do not eat 10-12 hours before your appointment if you are coming in fasting for labs on lipids, cholesterol, or glucose (sugar). This does not apply to pregnant women. Water, hot tea and black coffee (with nothing added) are okay. Do not drink other fluids, diet soda or chew gum.            Nov 06, 2018 10:30 AM CST   Lab with [facility]   [facility] ([facility])    [address] [place] [id number] [phone]            Nov 06, 2018 11:00 AM CST   (Arrive by 10:45 AM)   CORE RETURN with Vandana Zambrano NP   [facility] ([facility])    [address] [place] [address]  [address] [place] [id number] [phone]            Apr 02, 2019 10:45 AM CDT   Office Visit with Shanna Church MD   [facility] ([facility])    [address] [phone]           Bring a current list of meds  and any records pertaining to this visit. For Physicals, please bring immunization records and any forms needing to be filled out. Please arrive 10 minutes early to complete paperwork.            Apr 03, 2019  9:30 AM CDT   Ech Complete with MIS   [facility] ([facility])    [address]  3rd Floor  [facility] [id number] [phone]           1.  Please bring or wear a comfortable two-piece outfit. 2.  You may eat, drink and take your normal medicines. 3.  For any questions that cannot be answered, please contact the ordering physician 4.  Please do not wear perfumes or scented lotions on the day of your exam.            Apr 03, 2019 10:00 AM CDT   (Arrive by 9:45 AM)   Return Visit with Amanda Amaral MD   [facility] ([facility])    [address]  [address] [place] [id number] [phone]              Future tests that were ordered for you today     Open Future Orders        Priority Expected Expires Ordered    Follow-Up with CORE Clinic Routine 11/5/2018 1/20/2019 10/15/2018    Basic metabolic panel Routine 10/22/2018 10/15/2019 10/15/2018            Who to contact     If you have questions or need follow up information about today's clinic visit or your schedule please contact [facility] directly at [phone].  Normal or non-critical lab and imaging results will be communicated to you by MyChart, letter or phone within 4 business days after the clinic has received the results. If you do not hear from us within 7 days, please contact the clinic through [employer] or phone. If you have a critical or abnormal lab result, we will notify you by phone as soon as possible.  Submit refill requests through [employer] or call your pharmacy and they will forward the refill request to us. Please allow 3 business days for your refill to be completed.          Additional Information About Your Visit        [employer]  "Information     [employer] lets you send messages to your doctor, view your test results, renew your prescriptions, schedule appointments and more. To sign up, go to [email or web address] . Click on \"Log in\" on the left side of the screen, which will take you to the Welcome page. Then click on \"Sign up Now\" on the right side of the page.     You will be asked to enter the access code listed below, as well as some personal information. Please follow the directions to create your username and password.     Your access code is: [email or web address]  Expires: 2018  6:31 AM     Your access code will  in 90 days. If you need help or a new code, please call your [place] clinic or [phone].        Care EveryWhere ID     This is your [facility] EveryWhere ID. This could be used by other organizations to access your [place] medical records  [id number]        Your Vitals Were     Pulse Pulse Oximetry BMI (Body Mass Index)             69 98% 23.51 kg/m2          Blood Pressure from Last 3 Encounters:   10/15/18 (!) 147/95   10/15/18 (!) 147/95   10/01/18 130/82    Weight from Last 3 Encounters:   10/15/18 58.3 kg (128 lb 8.5 oz)   10/15/18 58.3 kg (128 lb 9.6 oz)   10/01/18 57.8 kg (127 lb 6.4 oz)                 Today's Medication Changes          These changes are accurate as of 10/15/18  1:15 PM.  If you have any questions, ask your nurse or doctor.               These medicines have changed or have updated prescriptions.        Dose/Directions    lisinopril 2.5 MG tablet   Commonly known as:  PRINIVIL/Zestril   This may have changed:    - how much to take  - how to take this  - when to take this  - additional instructions   Used for:  Ischemic cardiomyopathy   Changed by:  Lorraine Cleveland PA-C        Dose:  5 mg   Take 2 tablets (5 mg) by mouth 2 times daily   Quantity:  90 tablet   Refills:  11         Stop taking these medicines if you haven't already. Please contact your care team if you have questions.     " potassium chloride SA 10 MEQ CR tablet   Commonly known as:  K-DUR/KLOR-CON M   Stopped by:  Lorraine Cleveland PA-C                Where to get your medicines      These medications were sent to [id number] - [place], [place] - [address] [address]     Phone:  [phone]     lisinopril 2.5 MG tablet                Primary Care Provider Office Phone # Fax #    Shanna Church [phone]       [address] [facility] [address]        Equal Access to Services     [facility]: Hadii aad ku hadasho Soomaali, waaxda luqadaha, qaybta kaalmada adeegyada, waxay idiin hayaan adeeg tasha nettles . So [facility] [phone].    ATENCIÓN: Si habla español, tiene a suarez disposición servicios gratuitos de asistencia lingüística. [facility] [phone].    We comply with applicable federal civil rights laws and [place] laws. We do not discriminate on the basis of race, color, national origin, age, disability, sex, sexual orientation, or gender identity.            Thank you!     Thank you for choosing [facility]  for your care. Our goal is always to provide you with excellent care. Hearing back from our patients is one way we can continue to improve our services. Please take a few minutes to complete the written survey that you may receive in the mail after your visit with us. Thank you!             Your Updated Medication List - Protect others around you: Learn how to safely use, store and throw away your medicines at www.disposemymeds.org.          This list is accurate as of 10/15/18  1:15 PM.  Always use your most recent med list.                   Brand Name Dispense Instructions for use Diagnosis    aspirin 81 MG chewable tablet     90 tablet    Take 1 tablet (81 mg) by mouth daily    Coronary artery disease involving native coronary artery of native heart without angina pectoris       atorvastatin 40 MG tablet    LIPITOR    60 tablet    1  TABLET (40 MG) BY ORAL OR FEEDING TUBE ROUTE DAILY    Coronary artery disease involving native coronary artery of native heart without angina pectoris, Cardiogenic shock (H)       bumetanide 0.5 MG tablet    BUMEX    45 tablet    Take 1 tablet (0.5 mg) by mouth every other day    Ischemic cardiomyopathy       cetirizine 10 MG tablet    zyrTEC    60 tablet    TAKE 1 TABLET (10 MG) BY MOUTH DAILY    Environmental allergies       digoxin 125 MCG tablet    LANOXIN    60 tablet    Take one tablet on M, W, F, Sunday.    Coronary artery disease involving native coronary artery of native heart without angina pectoris       fluticasone 50 MCG/ACT spray    FLONASE    3 Bottle    Spray 2 sprays into both nostrils daily    Nasal congestion       glipiZIDE 5 MG tablet    GLUCOTROL    180 tablet    Take 1 tablet (5 mg) by mouth 2 times daily (before meals)    Type 2 diabetes mellitus without complication, without long-term current use of insulin (H)       insulin pen needle 32G X 4 MM    BD KYLEE U/F    100 each    Use 3 daily or as directed.    Diabetes mellitus type 2, insulin dependent (H)       lisinopril 2.5 MG tablet    PRINIVIL/Zestril    90 tablet    Take 2 tablets (5 mg) by mouth 2 times daily    Ischemic cardiomyopathy       metFORMIN 500 MG 24 hr tablet    GLUCOPHAGE-XR    180 tablet    Take 1 tablet (500 mg) by mouth daily (with dinner)    Type 2 diabetes mellitus without complication, without long-term current use of insulin (H)       metoprolol succinate 50 MG 24 hr tablet    TOPROL-XL    180 tablet    Take 3 tablets (150 mg) by mouth daily    Chronic systolic congestive heart failure (H)       ONETOUCH ULTRA test strip   Generic drug:  blood glucose monitoring     100 strip    USE TO TEST BLOOD SUGAR 3 TIMES DAILY OR AS DIRECTED.    Diabetes mellitus type 2, insulin dependent (H)

## 2018-10-15 NOTE — LETTER
"10/15/2018      RE: Luke Henao  [address]  [facility] [address]       Dear Colleague,    Thank you for the opportunity to participate in the care of your patient, Luke Henao, at the [facility] HEART [facility]. Please see a copy of my visit note below.    Heart Care - Clinical Cardiac Electrophysiology       HPI: Luke Henao is a 53 year-old male who presents with  for follow up of ICD. The patient has a past medical history significant for tobacco use, DM type II, and CAD with RCA STEMI s/p PCI (x 7 on 3/27/17), ischemic MR s/p mitral clip (5/1/17), and ICM s/p ICD (10/26/2017).   His LVEF did not recover from 30-35% despite optimal medical treatment following revascularization.  ICD single chamber Medtronic was implanted on 10/26/2017 for SCD prophylaxis.     Reviewed current interval:  - Echocardiogram 4/18/2018 showed EF 30-35%.  - Current cardiac medications: Aspirin 81 mg daily, atorvastatin 40 mg daily, bumex 0.5 mg every other day, K-DUR 10 MEq every other day with bumex, digoxin 125 MCG MWFSunday, lisinopril 2.5 mg in the am and 5 mg in the pm, metoprolol  mg daily.   - Presenting Device check: \"Medtronic single lead ICD\" \"Normal ICD function.  No arrhythmias recorded.  Intrinsic rhythm = NSR @ 66 bpm.   = <0.1%.  OptiVol fluid index is slightly above baseline.  Estimated battery longevity to MOMO = 11.1 years.  No short v-v intervals recorded.  RV lead impedance trends appear stable.\"    Current Interval history:   Patient states that rarely misses doses of medication but that he has not yet taken his medications this morning because he is fasting for blood draw. States that his family doctor had him stop his Plavix 2 weeks ago. States that activity level is moderate walking 20 minutes daily without problems.  Denies apnea or daytime sleepiness but not sure if he snores at night.  Sleeps with one pillows under head.  Denies chest pain " or pressure, dizziness, syncope, dyspnea at rest or with exertion, palpitations, orthopnea, PND, abdominal edema, pedal edema, or claudication.  Denies easy bruising or bleeding, hematuria, hematochezia, and epistaxis.     Current Outpatient Prescriptions   Medication Sig Dispense Refill     aspirin 81 MG chewable tablet Take 1 tablet (81 mg) by mouth daily 90 tablet 3     atorvastatin (LIPITOR) 40 MG tablet 1 TABLET (40 MG) BY ORAL OR FEEDING TUBE ROUTE DAILY 60 tablet 0     bumetanide (BUMEX) 0.5 MG tablet Take 1 tablet (0.5 mg) by mouth every other day 45 tablet 3     cetirizine (ZYRTEC) 10 MG tablet TAKE 1 TABLET (10 MG) BY MOUTH DAILY 60 tablet 6     digoxin (LANOXIN) 125 MCG tablet Take one tablet on M, W, F, Sunday. 60 tablet 5     fluticasone (FLONASE) 50 MCG/ACT spray Spray 2 sprays into both nostrils daily 3 Bottle 3     glipiZIDE (GLUCOTROL) 5 MG tablet Take 1 tablet (5 mg) by mouth 2 times daily (before meals) 180 tablet 3     insulin pen needle (BD KYLEE U/F) 32G X 4 MM Use 3 daily or as directed. 100 each prn     lisinopril (PRINIVIL/ZESTRIL) 2.5 MG tablet Take 2.5 mg in the am and 5 mg in the evening. 90 tablet 11     metFORMIN (GLUCOPHAGE-XR) 500 MG 24 hr tablet Take 1 tablet (500 mg) by mouth daily (with dinner) 180 tablet 3     metoprolol succinate (TOPROL-XL) 50 MG 24 hr tablet Take 3 tablets (150 mg) by mouth daily 180 tablet 3     ONETOUCH ULTRA test strip USE TO TEST BLOOD SUGAR 3 TIMES DAILY OR AS DIRECTED. 100 strip 3     potassium chloride SA (K-DUR/KLOR-CON M) 10 MEQ CR tablet Take 1 pill every other day when you take bumex. 30 tablet 3       Past Medical History:   Diagnosis Date     Acne      CAD (coronary artery disease) 2017    RCA STEMI s/p balloon angioplasty and multiple Sofie to RCA, LCx, and LAD     Cardiogenic shock (H)      DVT (deep venous thrombosis) (H) 2017    left internal jugular (line-associated); left common femoral; on coumadin     Ischemic cardiomyopathy 2017    EF 30-35%      Ischemic stroke (H) 2017    no residual deficits     Lower GI bleed 2017    due to rectal ulcer     Mitral valve insufficiency, ischemic 2017    s/p Mitral Clip placement      Skin ulcer of sacrum (H)      Systolic heart failure (H)      Type 2 diabetes mellitus (H)        Past Surgical History:   Procedure Laterality Date     C INSERT ELECTRD LEADS/REPOSTION  10/27/2017          COLONOSCOPY N/A 4/17/2017    Procedure: COLONOSCOPY;  Surgeon: Rashaad Bundy MD;  Location: [facility]     INSERT INTRAAORTIC BALLOON PUMP Right 4/19/2017    Procedure: INSERT INTRAAORTIC BALLOON PUMP;  Right Subclavian Intra Aortic Balloon Pump Insertion using Maquet 40cc Ballon Catheter, Implentation of 8mm Gelweave Woven Vascular Prosthesis, Removal of Left Femoral Ballon Pump Catheter, Flouroscopy;  Surgeon: Keshav Leung MD;  Location: [facility] OR     PERCUTANEOUS MITRAL VALVE REPAIR N/A 5/1/2017    Procedure: PERCUTANEOUS MITRAL VALVE REPAIR ANESTHESIA;  Mitraclip Procedure Possible Cardiopulmonary Bypass ;  Surgeon: Ron Cortez MD;  Location: [facility] OR     SUBCLAVIAN AORTIC VALVE IMPLANT N/A 5/8/2017    Procedure: SUBCLAVIAN AORTIC VALVE IMPLANT;  Right Subclavian Graft Removal ;  Surgeon: Keshav Leung MD;  Location: [facility] OR       Family History   Problem Relation Age of Onset     Hypertension Mother      Type 2 Diabetes Father      Hypertension Father      Myocardial Infarction No family hx of      Cerebrovascular Disease No family hx of      Coronary Artery Disease Early Onset No family hx of      Colon Cancer No family hx of      Prostate Cancer No family hx of        Social History   Substance Use Topics     Smoking status: Former Smoker     Packs/day: 0.50     Years: 30.00     Types: Cigarettes     Quit date: 1/1/2017     Smokeless tobacco: Never Used     Alcohol use No       Allergies   Allergen Reactions     Seasonal Allergies Difficulty breathing         ROS:   A complete review of systems was  "performed and is negative except as noted in the HPI.     Physical Examination:  Vitals: BP (!) 147/95 (BP Location: Left arm, Patient Position: Chair, Cuff Size: Adult Small)  Pulse 69  Ht 1.575 m (5' 2\")  Wt 58.3 kg (128 lb 9.6 oz)  SpO2 98%  BMI 23.52 kg/m2  BMI= Body mass index is 23.52 kg/(m^2).    GENERAL APPEARANCE: healthy, alert, and no acute distress  HEENT: no icterus, no xanthelasmas, normal pupil size and reaction, no cyanosis.  NECK: no asymmetry, no cervical bruits, no JVD   RESPIRATORY: lungs clear to auscultation - no rales, rhonchi or wheezes, no use of accessory muscles, no retractions, respirations are unlabored, normal respiratory rate  CARDIOVASCULAR: regular rhythm, normal S1 with physiologic split S2, no S3 or S4 and no murmur, click or rub  GI:  no abdominal bruits, soft, non-tender  EXTREMITIES: no clubbing  NEURO: alert and oriented to person/place/time, normal speech, gait and affect  VASC: Radial and posterior tibialis pulses +2 and symmetric bilaterally. No cyanosis or edema.   SKIN: no ecchymoses, no rashes. Well healed left subclavian device pocket.     Assessment and recommendations:    #1. Cardiac device in situ: ICD single chamber Medtronic was implanted on 10/26/2017 for SCD prophylaxis   1. Normal device function.   2. Keep scheduled follow ups with Device Clinic     #2. ICM: LVEF 30-35%(4/2018), NYHA III, Fluid status: euvolemic on exam   1. ACEi/ARB: Continue lisinopril.  Discussion about switching to Entresto at next CORE visit noted.   2. BB: Continue metoprolol  mg daily   3. Aldosterone antagonist: Not used secondary to worsening renal function with prior attempts.   4. NSAID us: Recommend avoidance of NSAIDS   5. SCD prophylaxis: ICD for primary prevention of SCD in place   6. % BiV pacing: He is only pacing  <0.1% in the ventricle at this time so not indicated.  If pacing % increases, will obtain an EKG to evaluate QRS duration. CRT-D criteria includes long QRS " >0.15 ms.  7. Recommend a low sodium diet (less than 2 grams/day)   8. Take daily weights and notify clinic if a weight gain of more than 3 lbs in a day or 5 lbs in a week.   9. Diuretic management: Bumex 0.5 mg and K Dur 10 MEq every other day. Labs are planned for CORE visit today.   10. He is currently on digoxin 125 MCG 4 days/week. There has been some discussion about discontinuing in favor of starting Entresto.  Will defer this decision to CORE.   11. [place] scheduled [place] [facility] [place] of heart failure.     #3. CAD: RCA STEMI s/p PCI (x 7 on 3/27/17)   1. Aspirin: continue 81 mg daily   2. Statin:  continue atorvastatin 40 mg daily   3. BB:  Continue metoprolol  mg daily   4. ACEi/ARB: continue 2.5 mg in the am and 5 mg in the pm   5. Lifestyle: Avoid tobacco, healthy weight, limit alcohol, low-sodium diet, 2-3 servings fruit and vegetables/day, limit saturated fats, regular exercise     #4. Ischemic MR: s/p mitral clip (5/1/17)   1. Echocardiogram is planned for 4/2019     Follow up: Follow up in EP Clinic in one year or follow up sooner as needed for symptoms or problems.     Patient expresses understanding and agreement with the plan.    I appreciate the chance to help with Luke Henao's care. Please let me know if you have any questions or concerns.    Viki VENEGAS, CNP

## 2018-10-15 NOTE — PATIENT INSTRUCTIONS
Cardiology Provider you saw in clinic today: Viki VENEGAS, NP-C    Your cardiac device has normal function.      Follow-up Visit:  Follow up in one year.     Further Instructions:       Avoid tobacco, healthy weight, limit alcohol, low-sodium diet, 2-3 servings fruit and vegetables/day, limit saturated fats, regular exercise     Recommend avoidance of NSAIDS (Advil,ibuprofen).  Tylenol is okay as needed for occasional pain.     Recommend a low sodium diet (less than 2 grams/day)     Take daily weights and notify clinic if a weight gain of more than 3 lbs in a day or 5 lbs in a week.    You will receive all normal lab and testing results via [facility] or mail if not reviewed in clinic today. Please contact our office if you need assistance with setting up MyChart.    If you need a medication refill please contact your pharmacy. Please allow 3 business days for your refill to be completed.     As always, thank you for trusting us with your health care needs!    If you have any questions regarding your visit please contact your care team:   Cardiology  Telephone Number    EP RN  Electrophysiology Nurse Coordinator [phone]     Call for EP procedure scheduling concerns  MONO Wilson  [phone]           Device Clinic (Pacemakers, ICDs, Loop)   RN's : Bertha Sykes Connie, Dawn During business hours: [phone]    After business hours:   [phone]- select option 4 and ask for job code [id number].

## 2018-10-15 NOTE — PATIENT INSTRUCTIONS
Cardiology Providers you saw during your visit:  Michelle Cleveland NP    Results for SYDNIE SIERRA (MRN [id number]) as of 10/15/2018 13:04   Ref. Range 10/15/2018 11:17   Sodium Latest Ref Range: 133 - 144 mmol/L 140   Potassium Latest Ref Range: 3.4 - 5.3 mmol/L 4.6   Chloride Latest Ref Range: 94 - 109 mmol/L 108   Carbon Dioxide Latest Ref Range: 20 - 32 mmol/L 24   Urea Nitrogen Latest Ref Range: 7 - 30 mg/dL 28   Creatinine Latest Ref Range: 0.66 - 1.25 mg/dL 1.19   GFR Estimate Latest Ref Range: >60 mL/min/1.7m2 64   GFR Estimate If Black Latest Ref Range: >60 mL/min/1.7m2 78   Calcium Latest Ref Range: 8.5 - 10.1 mg/dL 8.6   Anion Gap Latest Ref Range: 3 - 14 mmol/L 9   Glucose Latest Ref Range: 70 - 99 mg/dL 113 (H)       Medication changes:  INCREASE Lisinopril to 5 mg (one tablet) twice a day.   STOP taking potassium.   INCREASE Bumex to 0.5 mg (one tablet) daily for the next 3 days. Then return to 0.5mg (one tablet) every other day.     Follow up:    Please return to clinic for follow-up:  CORE follow-up  with Vandana Zambrano NP in 1 month.     Return for lab work in 7-10 days.       Please call if you have:  1. Weight gain of more than 2 pounds in a day or 5 pounds in a week  2. Increased shortness of breath, swelling or bloating  3. Dizziness, lightheadedness   4. Any questions or concerns.     Follow the American Heart Association Diet and Lifestyle recommendations:  Limit saturated fat, trans fat, sodium, red meat, sweets and sugar-sweetened beverages. If you choose to eat red meat, compare labels and select the leanest cuts available.  Aim for at least 150 minutes of moderate physical activity or 75 minutes of vigorous physical activity - or an equal combination of both - each week.    During business hours: [phone], press option # 1 to be routed to the [employer] then option # 3 for medical questions to speak with a nurse.     After hours, weekends or holidays: On Call Cardiologist-  [phone]   option #4 and ask to speak to the on-call Cardiologist. Inform them you are a CORE/heart failure patient at the [facility].      Opal Benitez, RN  Cardiology Nurse Care Coordinator    Keep up the good work!    Take Care!

## 2018-10-15 NOTE — MR AVS SNAPSHOT
After Visit Summary   10/15/2018    Luke Henao    MRN: [id number]           Patient Information     Date Of Birth          1965        Visit Information        Provider Department      10/15/2018 9:45 AM Osorio Cabrera; Prince,  Cv Device [facility]        Today's Diagnoses     Ischemic cardiomyopathy          Care Instructions    It was a pleasure to see you in clinic today.  Please do not hesitate to call with any questions or concerns.  You are scheduled for a remote transmission on 1/21/19.  We look forward to seeing you in clinic at your next device check in 6 months.    Sade Artis, RN, MS, CCRN  Electrophysiology Nurse Clinician  [facility]    During Business Hours Please Call:  [phone]  After Hours Please Call:  [phone] - select option #4 and ask for job code [id number]                        Follow-ups after your visit        Additional Services     Follow-Up with Device Clinic-6 months                 Your next 10 appointments already scheduled     Oct 15, 2018 11:00 AM CDT   Lab with [facility] LAB   [facility] ([facility])    [address] [place] [id number] [phone]            Oct 15, 2018 11:45 AM CDT   CORE RETURN with Lorraine Cleveland PA-C   [facility] ([facility])    [address] [place] [address]  [address] [place] [id number] [phone]            Apr 02, 2019 10:45 AM CDT   Office Visit with Shanna Church MD   [facility] ([facility])    [address] [id number] [phone]           Bring a current list of meds and any records pertaining to this visit. For Physicals, please bring immunization records and any forms needing to be filled out. Please arrive 10 minutes early to complete paperwork.            Apr 03, 2019  9:30 AM CDT   Ech Complete with [facility] ([facility]  [facility])    [address]  3rd Floor  [facility] [id number] [phone]           1.  Please bring or wear a comfortable two-piece outfit. 2.  You may eat, drink and take your normal medicines. 3.  For any questions that cannot be answered, please contact the ordering physician 4.  Please do not wear perfumes or scented lotions on the day of your exam.            2019 10:00 AM CDT   (Arrive by 9:45 AM)   Return Visit with Amanda Amaral MD   [facility] ([facility])    [address]  [address]  [facility] [id number] [phone]              Future tests that were ordered for you today     Open Future Orders        Priority Expected Expires Ordered    Follow-Up with Device Clinic-6 months Routine 2019 2019 10/15/2018            Who to contact     Please call your clinic at No information on file. to:    Ask questions about your health    Make or cancel appointments    Discuss your medicines    Learn about your test results    Speak to your doctor            Additional Information About Your Visit        Squrl Information     Squrl is an electronic gateway that provides easy, online access to your medical records. With Squrl, you can request a clinic appointment, read your test results, renew a prescription or communicate with your care team.     To sign up for Squrl visit the website at [email or web address]   You will be asked to enter the access code listed below, as well as some personal information. Please follow the directions to create your username and password.     Your access code is: [email or web address]  Expires: 2018  6:31 AM     Your access code will  in 90 days. If you need help or a new code, please contact your [facility] or call [phone] for assistance.        Care EveryWhere ID     This is your Care EveryWhere ID. This could be used by  other organizations to access your [facility] medical records  [id number]         Blood Pressure from Last 3 Encounters:   10/01/18 130/82   09/05/18 136/90   08/03/18 139/89    Weight from Last 3 Encounters:   10/01/18 57.8 kg (127 lb 6.4 oz)   09/05/18 59.4 kg (131 lb)   08/03/18 58.1 kg (128 lb 1.6 oz)              We Performed the Following     Follow-Up with Device Clinic-6 months     ICD DEVICE PROGRAMMING EVAL, SINGLE LEAD ICD        Primary Care Provider Office Phone # Fax #    Shanna Church [phone]       [address] [place] [id number]        Equal Access to Services     CUBA AYALA : Hadii aad ku hadasho Soericka, waaxda luqadaha, qaybta kaalmada adeegyada, waxnandini littlein loni nettles . So [facility] [phone].    ATENCIÓN: Si habla español, tiene a suarez disposición servicios gratuitos de asistencia lingüística. [facility] [phone].    We comply with applicable federal civil rights laws and [place] laws. We do not discriminate on the basis of race, color, national origin, age, disability, sex, sexual orientation, or gender identity.            Thank you!     Thank you for choosing [facility]  for your care. Our goal is always to provide you with excellent care. Hearing back from our patients is one way we can continue to improve our services. Please take a few minutes to complete the written survey that you may receive in the mail after your visit with us. Thank you!             Your Updated Medication List - Protect others around you: Learn how to safely use, store and throw away your medicines at www.disposemymeds.org.          This list is accurate as of 10/15/18 10:30 AM.  Always use your most recent med list.                   Brand Name Dispense Instructions for use Diagnosis    aspirin 81 MG chewable tablet     90 tablet    Take 1 tablet (81 mg) by mouth daily    Coronary artery disease involving native coronary artery of native heart without angina  pectoris       atorvastatin 40 MG tablet    LIPITOR    60 tablet    1 TABLET (40 MG) BY ORAL OR FEEDING TUBE ROUTE DAILY    Coronary artery disease involving native coronary artery of native heart without angina pectoris, Cardiogenic shock (H)       bumetanide 0.5 MG tablet    BUMEX    45 tablet    Take 1 tablet (0.5 mg) by mouth every other day    Ischemic cardiomyopathy       cetirizine 10 MG tablet    zyrTEC    60 tablet    TAKE 1 TABLET (10 MG) BY MOUTH DAILY    Environmental allergies       digoxin 125 MCG tablet    LANOXIN    60 tablet    Take one tablet on M, W, F, Sunday.    Coronary artery disease involving native coronary artery of native heart without angina pectoris       fluticasone 50 MCG/ACT spray    FLONASE    3 Bottle    Spray 2 sprays into both nostrils daily    Nasal congestion       glipiZIDE 5 MG tablet    GLUCOTROL    180 tablet    Take 1 tablet (5 mg) by mouth 2 times daily (before meals)    Type 2 diabetes mellitus without complication, without long-term current use of insulin (H)       insulin pen needle 32G X 4 MM    BD KYLEE U/F    100 each    Use 3 daily or as directed.    Diabetes mellitus type 2, insulin dependent (H)       lisinopril 2.5 MG tablet    PRINIVIL/Zestril    90 tablet    Take 2.5 mg in the am and 5 mg in the evening.    Ischemic cardiomyopathy       metFORMIN 500 MG 24 hr tablet    GLUCOPHAGE-XR    180 tablet    Take 1 tablet (500 mg) by mouth daily (with dinner)    Type 2 diabetes mellitus without complication, without long-term current use of insulin (H)       metoprolol succinate 50 MG 24 hr tablet    TOPROL-XL    180 tablet    Take 3 tablets (150 mg) by mouth daily    Chronic systolic congestive heart failure (H)       ONETOUCH ULTRA test strip   Generic drug:  blood glucose monitoring     100 strip    USE TO TEST BLOOD SUGAR 3 TIMES DAILY OR AS DIRECTED.    Diabetes mellitus type 2, insulin dependent (H)       potassium chloride SA 10 MEQ CR tablet    K-DUR/KLOR-CON M     30 tablet    Take 1 pill every other day when you take bumex.    Chronic systolic congestive heart failure (H)

## 2018-10-15 NOTE — MR AVS SNAPSHOT
After Visit Summary   10/15/2018    Luke Henao    MRN: [id number]           Patient Information     Date Of Birth          1965        Visit Information        Provider Department      10/15/2018 10:15 AM Kellie Cabrera Jessica Lee, APRN CNP [facility]        Today's Diagnoses     ICD (implantable cardioverter-defibrillator) in place    -  1    Ischemic cardiomyopathy        Coronary artery disease involving native coronary artery of native heart without angina pectoris        Non-rheumatic mitral regurgitation          Care Instructions    Cardiology Provider you saw in clinic today: Viki VENEGAS, NP-C    Your cardiac device has normal function.      Follow-up Visit:  Follow up in one year.     Further Instructions:       Avoid tobacco, healthy weight, limit alcohol, low-sodium diet, 2-3 servings fruit and vegetables/day, limit saturated fats, regular exercise     Recommend avoidance of NSAIDS (Advil,ibuprofen).  Tylenol is okay as needed for occasional pain.     Recommend a low sodium diet (less than 2 grams/day)     Take daily weights and notify clinic if a weight gain of more than 3 lbs in a day or 5 lbs in a week.    You will receive all normal lab and testing results via Audacious or mail if not reviewed in clinic today. Please contact our office if you need assistance with setting up MyChart.    If you need a medication refill please contact your pharmacy. Please allow 3 business days for your refill to be completed.     As always, thank you for trusting us with your health care needs!    If you have any questions regarding your visit please contact your care team:   Cardiology  Telephone Number    EP RN  Electrophysiology Nurse Coordinator [phone]     Call for EP procedure scheduling concerns  MONO Wilson  [phone]           Device Clinic (Pacemakers, ICDs, Loop)   RN's : Bertha Sykes Connie, Dawn During business hours: [phone]    After  business hours:   [phone]- select option 4 and ask for job code [id number].                  Follow-ups after your visit        Your next 10 appointments already scheduled     Oct 15, 2018 11:00 AM CDT   Lab with UC LAB   [facility] ([facility])    [address] [place] [address]  1st [facility] [id number] [phone]            Oct 15, 2018 11:45 AM CDT   CORE RETURN with Lorraine Cleveland PA-C   [facility] ([facility])    [address] [place] [address]  [address] [place] [id number] [phone]            Apr 02, 2019 10:45 AM CDT   Office Visit with Shanna Church MD   [facility] ([facility])    [address] [address] [phone]           Bring a current list of meds and any records pertaining to this visit. For Physicals, please bring immunization records and any forms needing to be filled out. Please arrive 10 minutes early to complete paperwork.            Apr 03, 2019  9:30 AM CDT   Ech Complete with MIS    [facility] ([facility])    [address] [place] [address]  3rd Floor  [facility] [address] [phone]           1.  Please bring or wear a comfortable two-piece outfit. 2.  You may eat, drink and take your normal medicines. 3.  For any questions that cannot be answered, please contact the ordering physician 4.  Please do not wear perfumes or scented lotions on the day of your exam.            Apr 03, 2019 10:00 AM CDT   (Arrive by 9:45 AM)   Return Visit with Amanda Amaral MD   [facility] ([facility])    [address] [place] [address]  [address] [place] [id number] [phone]              Who to contact     If you have questions or need follow up information about today's clinic visit or your schedule please contact [facility] directly at [phone].  Normal or non-critical lab  "and imaging results will be communicated to you by MyChart, letter or phone within 4 business days after the clinic has received the results. If you do not hear from us within 7 days, please contact the clinic through [facility] or phone. If you have a critical or abnormal lab result, we will notify you by phone as soon as possible.  Submit refill requests through Dwllr or call your pharmacy and they will forward the refill request to us. Please allow 3 business days for your refill to be completed.          Additional Information About Your Visit        [facility] Information     Dwllr lets you send messages to your doctor, view your test results, renew your prescriptions, schedule appointments and more. To sign up, go to [email or web address] . Click on \"Log in\" on the left side of the screen, which will take you to the Welcome page. Then click on \"Sign up Now\" on the right side of the page.     You will be asked to enter the access code listed below, as well as some personal information. Please follow the directions to create your username and password.     Your access code is: [email or web address]  Expires: 2018  6:31 AM     Your access code will  in 90 days. If you need help or a new code, please call your [place] clinic or [phone].        Care EveryWhere ID     This is your Care EveryWhere ID. This could be used by other organizations to access your [place] medical records  [id number]        Your Vitals Were     Pulse Height Pulse Oximetry BMI (Body Mass Index)          69 1.575 m (5' 2\") 98% 23.52 kg/m2         Blood Pressure from Last 3 Encounters:   10/15/18 (!) 147/95   10/01/18 130/82   18 136/90    Weight from Last 3 Encounters:   10/15/18 58.3 kg (128 lb 9.6 oz)   10/01/18 57.8 kg (127 lb 6.4 oz)   18 59.4 kg (131 lb)              We Performed the Following     Follow-Up with EP Cardiac Advanced Practice Provider- 1 Year        Primary Care Provider Office Phone # Fax #    " Shanna Church -[phone]       [address] [facility] [address]        Equal Access to Services     CUBA AYALA : Enedelia linda el alex Justin, mirlande violetakamini, aris kayandel kinney, karen richeyjoann silas. So [facility] [phone].    ATENCIÓN: Si habla español, tiene a suarez disposición servicios gratuitos de asistencia lingüística. Llame al [phone].    We comply with applicable federal civil rights laws and [place] laws. We do not discriminate on the basis of race, color, national origin, age, disability, sex, sexual orientation, or gender identity.            Thank you!     Thank you for choosing [facility]  for your care. Our goal is always to provide you with excellent care. Hearing back from our patients is one way we can continue to improve our services. Please take a few minutes to complete the written survey that you may receive in the mail after your visit with us. Thank you!             Your Updated Medication List - Protect others around you: Learn how to safely use, store and throw away your medicines at www.disposemymeds.org.          This list is accurate as of 10/15/18 10:58 AM.  Always use your most recent med list.                   Brand Name Dispense Instructions for use Diagnosis    aspirin 81 MG chewable tablet     90 tablet    Take 1 tablet (81 mg) by mouth daily    Coronary artery disease involving native coronary artery of native heart without angina pectoris       atorvastatin 40 MG tablet    LIPITOR    60 tablet    1 TABLET (40 MG) BY ORAL OR FEEDING TUBE ROUTE DAILY    Coronary artery disease involving native coronary artery of native heart without angina pectoris, Cardiogenic shock (H)       bumetanide 0.5 MG tablet    BUMEX    45 tablet    Take 1 tablet (0.5 mg) by mouth every other day    Ischemic cardiomyopathy       cetirizine 10 MG tablet    zyrTEC    60 tablet    TAKE 1 TABLET (10 MG) BY MOUTH DAILY    Environmental  allergies       digoxin 125 MCG tablet    LANOXIN    60 tablet    Take one tablet on M, W, F, Sunday.    Coronary artery disease involving native coronary artery of native heart without angina pectoris       fluticasone 50 MCG/ACT spray    FLONASE    3 Bottle    Spray 2 sprays into both nostrils daily    Nasal congestion       glipiZIDE 5 MG tablet    GLUCOTROL    180 tablet    Take 1 tablet (5 mg) by mouth 2 times daily (before meals)    Type 2 diabetes mellitus without complication, without long-term current use of insulin (H)       insulin pen needle 32G X 4 MM    BD KYLEE U/F    100 each    Use 3 daily or as directed.    Diabetes mellitus type 2, insulin dependent (H)       lisinopril 2.5 MG tablet    PRINIVIL/Zestril    90 tablet    Take 2.5 mg in the am and 5 mg in the evening.    Ischemic cardiomyopathy       metFORMIN 500 MG 24 hr tablet    GLUCOPHAGE-XR    180 tablet    Take 1 tablet (500 mg) by mouth daily (with dinner)    Type 2 diabetes mellitus without complication, without long-term current use of insulin (H)       metoprolol succinate 50 MG 24 hr tablet    TOPROL-XL    180 tablet    Take 3 tablets (150 mg) by mouth daily    Chronic systolic congestive heart failure (H)       ONETOUCH ULTRA test strip   Generic drug:  blood glucose monitoring     100 strip    USE TO TEST BLOOD SUGAR 3 TIMES DAILY OR AS DIRECTED.    Diabetes mellitus type 2, insulin dependent (H)       potassium chloride SA 10 MEQ CR tablet    K-DUR/KLOR-CON M    30 tablet    Take 1 pill every other day when you take bumex.    Chronic systolic congestive heart failure (H)

## 2018-10-15 NOTE — PROGRESS NOTES
"    Heart Care - Clinical Cardiac Electrophysiology       HPI: Luke Henao is a 53 year-old male who presents with  for follow up of ICD. The patient has a past medical history significant for tobacco use, DM type II, and CAD with RCA STEMI s/p PCI (x 7 on 3/27/17), ischemic MR s/p mitral clip (5/1/17), and ICM s/p ICD (10/26/2017).   His LVEF did not recover from 30-35% despite optimal medical treatment following revascularization.  ICD single chamber Medtronic was implanted on 10/26/2017 for SCD prophylaxis.     Reviewed current interval:  - Echocardiogram 4/18/2018 showed EF 30-35%.  - Current cardiac medications: Aspirin 81 mg daily, atorvastatin 40 mg daily, bumex 0.5 mg every other day, K-DUR 10 MEq every other day with bumex, digoxin 125 MCG MWFSunday, lisinopril 2.5 mg in the am and 5 mg in the pm, metoprolol  mg daily.   - Presenting Device check: \"Medtronic single lead ICD\" \"Normal ICD function.  No arrhythmias recorded.  Intrinsic rhythm = NSR @ 66 bpm.   = <0.1%.  OptiVol fluid index is slightly above baseline.  Estimated battery longevity to MOMO = 11.1 years.  No short v-v intervals recorded.  RV lead impedance trends appear stable.\"    Current Interval history:   Patient states that rarely misses doses of medication but that he has not yet taken his medications this morning because he is fasting for blood draw. States that his family doctor had him stop his Plavix 2 weeks ago. States that activity level is moderate walking 20 minutes daily without problems.  Denies apnea or daytime sleepiness but not sure if he snores at night.  Sleeps with one pillows under head.  Denies chest pain or pressure, dizziness, syncope, dyspnea at rest or with exertion, palpitations, orthopnea, PND, abdominal edema, pedal edema, or claudication.  Denies easy bruising or bleeding, hematuria, hematochezia, and epistaxis.     Current Outpatient Prescriptions   Medication Sig Dispense Refill     aspirin 81 MG " chewable tablet Take 1 tablet (81 mg) by mouth daily 90 tablet 3     atorvastatin (LIPITOR) 40 MG tablet 1 TABLET (40 MG) BY ORAL OR FEEDING TUBE ROUTE DAILY 60 tablet 0     bumetanide (BUMEX) 0.5 MG tablet Take 1 tablet (0.5 mg) by mouth every other day 45 tablet 3     cetirizine (ZYRTEC) 10 MG tablet TAKE 1 TABLET (10 MG) BY MOUTH DAILY 60 tablet 6     digoxin (LANOXIN) 125 MCG tablet Take one tablet on M, W, F, Sunday. 60 tablet 5     fluticasone (FLONASE) 50 MCG/ACT spray Spray 2 sprays into both nostrils daily 3 Bottle 3     glipiZIDE (GLUCOTROL) 5 MG tablet Take 1 tablet (5 mg) by mouth 2 times daily (before meals) 180 tablet 3     insulin pen needle (BD KYLEE U/F) 32G X 4 MM Use 3 daily or as directed. 100 each prn     lisinopril (PRINIVIL/ZESTRIL) 2.5 MG tablet Take 2.5 mg in the am and 5 mg in the evening. 90 tablet 11     metFORMIN (GLUCOPHAGE-XR) 500 MG 24 hr tablet Take 1 tablet (500 mg) by mouth daily (with dinner) 180 tablet 3     metoprolol succinate (TOPROL-XL) 50 MG 24 hr tablet Take 3 tablets (150 mg) by mouth daily 180 tablet 3     ONETOUCH ULTRA test strip USE TO TEST BLOOD SUGAR 3 TIMES DAILY OR AS DIRECTED. 100 strip 3     potassium chloride SA (K-DUR/KLOR-CON M) 10 MEQ CR tablet Take 1 pill every other day when you take bumex. 30 tablet 3       Past Medical History:   Diagnosis Date     Acne      CAD (coronary artery disease) 2017    RCA STEMI s/p balloon angioplasty and multiple Sofie to RCA, LCx, and LAD     Cardiogenic shock (H)      DVT (deep venous thrombosis) (H) 2017    left internal jugular (line-associated); left common femoral; on coumadin     Ischemic cardiomyopathy 2017    EF 30-35%     Ischemic stroke (H) 2017    no residual deficits     Lower GI bleed 2017    due to rectal ulcer     Mitral valve insufficiency, ischemic 2017    s/p Mitral Clip placement      Skin ulcer of sacrum (H)      Systolic heart failure (H)      Type 2 diabetes mellitus (H)        Past Surgical History:  "  Procedure Laterality Date     C INSERT ELECTRD LEADS/REPOSTION  10/27/2017          COLONOSCOPY N/A 4/17/2017    Procedure: COLONOSCOPY;  Surgeon: Rashaad Bundy MD;  Location: [facility]     INSERT INTRAAORTIC BALLOON PUMP Right 4/19/2017    Procedure: INSERT INTRAAORTIC BALLOON PUMP;  Right Subclavian Intra Aortic Balloon Pump Insertion using Maquet 40cc Ballon Catheter, Implentation of 8mm Gelweave Woven Vascular Prosthesis, Removal of Left Femoral Ballon Pump Catheter, Flouroscopy;  Surgeon: Keshav Leung MD;  Location: [facility] OR     PERCUTANEOUS MITRAL VALVE REPAIR N/A 5/1/2017    Procedure: PERCUTANEOUS MITRAL VALVE REPAIR ANESTHESIA;  Mitraclip Procedure Possible Cardiopulmonary Bypass ;  Surgeon: Ron Cortez MD;  Location: [facility] OR     SUBCLAVIAN AORTIC VALVE IMPLANT N/A 5/8/2017    Procedure: SUBCLAVIAN AORTIC VALVE IMPLANT;  Right Subclavian Graft Removal ;  Surgeon: Keshav Leung MD;  Location: [facility] OR       Family History   Problem Relation Age of Onset     Hypertension Mother      Type 2 Diabetes Father      Hypertension Father      Myocardial Infarction No family hx of      Cerebrovascular Disease No family hx of      Coronary Artery Disease Early Onset No family hx of      Colon Cancer No family hx of      Prostate Cancer No family hx of        Social History   Substance Use Topics     Smoking status: Former Smoker     Packs/day: 0.50     Years: 30.00     Types: Cigarettes     Quit date: 1/1/2017     Smokeless tobacco: Never Used     Alcohol use No       Allergies   Allergen Reactions     Seasonal Allergies Difficulty breathing         ROS:   A complete review of systems was performed and is negative except as noted in the HPI.     Physical Examination:  Vitals: BP (!) 147/95 (BP Location: Left arm, Patient Position: Chair, Cuff Size: Adult Small)  Pulse 69  Ht 1.575 m (5' 2\")  Wt 58.3 kg (128 lb 9.6 oz)  SpO2 98%  BMI 23.52 kg/m2  BMI= Body mass index is 23.52 " kg/(m^2).    GENERAL APPEARANCE: healthy, alert, and no acute distress  HEENT: no icterus, no xanthelasmas, normal pupil size and reaction, no cyanosis.  NECK: no asymmetry, no cervical bruits, no JVD   RESPIRATORY: lungs clear to auscultation - no rales, rhonchi or wheezes, no use of accessory muscles, no retractions, respirations are unlabored, normal respiratory rate  CARDIOVASCULAR: regular rhythm, normal S1 with physiologic split S2, no S3 or S4 and no murmur, click or rub  GI:  no abdominal bruits, soft, non-tender  EXTREMITIES: no clubbing  NEURO: alert and oriented to person/place/time, normal speech, gait and affect  VASC: Radial and posterior tibialis pulses +2 and symmetric bilaterally. No cyanosis or edema.   SKIN: no ecchymoses, no rashes. Well healed left subclavian device pocket.     Assessment and recommendations:    #1. Cardiac device in situ: ICD single chamber Medtronic was implanted on 10/26/2017 for SCD prophylaxis   1. Normal device function.   2. Keep scheduled follow ups with Device Clinic     #2. ICM: LVEF 30-35%(4/2018), NYHA III, Fluid status: euvolemic on exam   1. ACEi/ARB: Continue lisinopril.  Discussion about switching to Entresto at next CORE visit noted.   2. BB: Continue metoprolol  mg daily   3. Aldosterone antagonist: Not used secondary to worsening renal function with prior attempts.   4. NSAID us: Recommend avoidance of NSAIDS   5. SCD prophylaxis: ICD for primary prevention of SCD in place   6. % BiV pacing: He is only pacing  <0.1% in the ventricle at this time so not indicated.  If pacing % increases, will obtain an EKG to evaluate QRS duration. CRT-D criteria includes long QRS >0.15 ms.  7. Recommend a low sodium diet (less than 2 grams/day)   8. Take daily weights and notify clinic if a weight gain of more than 3 lbs in a day or 5 lbs in a week.   9. Diuretic management: Bumex 0.5 mg and K Dur 10 MEq every other day. Labs are planned for CORE visit today.   10. He is  currently on digoxin 125 MCG 4 days/week. There has been some discussion about discontinuing in favor of starting Entresto.  Will defer this decision to [facility].   11. Keep scheduled follow up with [facility] today for management of heart failure.     #3. CAD: RCA STEMI s/p PCI (x 7 on 3/27/17)   1. Aspirin: continue 81 mg daily   2. Statin:  continue atorvastatin 40 mg daily   3. BB:  Continue metoprolol  mg daily   4. ACEi/ARB: continue 2.5 mg in the am and 5 mg in the pm   5. Lifestyle: Avoid tobacco, healthy weight, limit alcohol, low-sodium diet, 2-3 servings fruit and vegetables/day, limit saturated fats, regular exercise     #4. Ischemic MR: s/p mitral clip (5/1/17)   1. Echocardiogram is planned for 4/2019     Follow up: Follow up in EP Clinic in one year or follow up sooner as needed for symptoms or problems.     Patient expresses understanding and agreement with the plan.    I appreciate the chance to help with Luke Henao's care. Please let me know if you have any questions or concerns.    Viki VENEGAS, CNP

## 2018-10-22 DIAGNOSIS — I25.10 CORONARY ARTERY DISEASE INVOLVING NATIVE CORONARY ARTERY OF NATIVE HEART WITHOUT ANGINA PECTORIS: ICD-10-CM

## 2018-10-22 DIAGNOSIS — I25.5 ISCHEMIC CARDIOMYOPATHY: ICD-10-CM

## 2018-10-22 DIAGNOSIS — R57.0 CARDIOGENIC SHOCK (H): ICD-10-CM

## 2018-10-22 LAB
ANION GAP SERPL CALCULATED.3IONS-SCNC: 8 MMOL/L (ref 3–14)
BUN SERPL-MCNC: 27 MG/DL (ref 7–30)
CALCIUM SERPL-MCNC: 8.9 MG/DL (ref 8.5–10.1)
CHLORIDE SERPL-SCNC: 106 MMOL/L (ref 94–109)
CHOLEST SERPL-MCNC: 109 MG/DL
CO2 SERPL-SCNC: 24 MMOL/L (ref 20–32)
CREAT SERPL-MCNC: 1.11 MG/DL (ref 0.66–1.25)
GFR SERPL CREATININE-BSD FRML MDRD: 70 ML/MIN/1.7M2
GLUCOSE SERPL-MCNC: 129 MG/DL (ref 70–99)
HDLC SERPL-MCNC: 31 MG/DL
LDLC SERPL CALC-MCNC: 50 MG/DL
NONHDLC SERPL-MCNC: 78 MG/DL
POTASSIUM SERPL-SCNC: 4.9 MMOL/L (ref 3.4–5.3)
SODIUM SERPL-SCNC: 138 MMOL/L (ref 133–144)
TRIGL SERPL-MCNC: 139 MG/DL

## 2018-10-22 PROCEDURE — 80048 BASIC METABOLIC PNL TOTAL CA: CPT | Performed by: INTERNAL MEDICINE

## 2018-10-22 PROCEDURE — 80061 LIPID PANEL: CPT | Performed by: INTERNAL MEDICINE

## 2018-10-22 PROCEDURE — 36415 COLL VENOUS BLD VENIPUNCTURE: CPT | Performed by: INTERNAL MEDICINE

## 2018-10-29 ENCOUNTER — TRANSFERRED RECORDS (OUTPATIENT)
Dept: HEALTH INFORMATION MANAGEMENT | Facility: CLINIC | Age: 53
End: 2018-10-29

## 2018-10-31 DIAGNOSIS — I25.5 ISCHEMIC CARDIOMYOPATHY: Primary | ICD-10-CM

## 2018-11-06 ENCOUNTER — TELEPHONE (OUTPATIENT)
Dept: CARDIOLOGY | Facility: CLINIC | Age: 53
End: 2018-11-06

## 2018-11-06 ENCOUNTER — OFFICE VISIT (OUTPATIENT)
Dept: CARDIOLOGY | Facility: CLINIC | Age: 53
End: 2018-11-06
Attending: NURSE PRACTITIONER
Payer: COMMERCIAL

## 2018-11-06 VITALS
HEART RATE: 71 BPM | SYSTOLIC BLOOD PRESSURE: 150 MMHG | BODY MASS INDEX: 24.11 KG/M2 | DIASTOLIC BLOOD PRESSURE: 94 MMHG | HEIGHT: 62 IN | WEIGHT: 131 LBS | OXYGEN SATURATION: 98 %

## 2018-11-06 DIAGNOSIS — I25.5 ISCHEMIC CARDIOMYOPATHY: Primary | ICD-10-CM

## 2018-11-06 DIAGNOSIS — I25.10 CORONARY ARTERY DISEASE INVOLVING NATIVE CORONARY ARTERY OF NATIVE HEART WITHOUT ANGINA PECTORIS: ICD-10-CM

## 2018-11-06 DIAGNOSIS — I25.5 ISCHEMIC CARDIOMYOPATHY: ICD-10-CM

## 2018-11-06 DIAGNOSIS — I10 BENIGN ESSENTIAL HYPERTENSION: ICD-10-CM

## 2018-11-06 DIAGNOSIS — Z98.890 HISTORY OF MITRAL VALVE REPAIR: ICD-10-CM

## 2018-11-06 LAB
ANION GAP SERPL CALCULATED.3IONS-SCNC: 10 MMOL/L (ref 3–14)
BUN SERPL-MCNC: 26 MG/DL (ref 7–30)
CALCIUM SERPL-MCNC: 8.8 MG/DL (ref 8.5–10.1)
CHLORIDE SERPL-SCNC: 106 MMOL/L (ref 94–109)
CO2 SERPL-SCNC: 26 MMOL/L (ref 20–32)
CREAT SERPL-MCNC: 1.19 MG/DL (ref 0.66–1.25)
GFR SERPL CREATININE-BSD FRML MDRD: 64 ML/MIN/1.7M2
GLUCOSE SERPL-MCNC: 62 MG/DL (ref 70–99)
POTASSIUM SERPL-SCNC: 3.7 MMOL/L (ref 3.4–5.3)
SODIUM SERPL-SCNC: 141 MMOL/L (ref 133–144)

## 2018-11-06 PROCEDURE — 36415 COLL VENOUS BLD VENIPUNCTURE: CPT | Performed by: NURSE PRACTITIONER

## 2018-11-06 PROCEDURE — 80048 BASIC METABOLIC PNL TOTAL CA: CPT | Performed by: NURSE PRACTITIONER

## 2018-11-06 PROCEDURE — 99214 OFFICE O/P EST MOD 30 MIN: CPT | Mod: ZP | Performed by: NURSE PRACTITIONER

## 2018-11-06 PROCEDURE — G0463 HOSPITAL OUTPT CLINIC VISIT: HCPCS | Mod: ZF

## 2018-11-06 RX ORDER — BUMETANIDE 0.5 MG/1
0.5 TABLET ORAL DAILY
Qty: 45 TABLET | Refills: 3 | Status: SHIPPED | OUTPATIENT
Start: 2018-11-06 | End: 2018-11-13

## 2018-11-06 ASSESSMENT — PAIN SCALES - GENERAL: PAINLEVEL: NO PAIN (0)

## 2018-11-06 NOTE — TELEPHONE ENCOUNTER
Prior Authorization Retail Medication Request    Medication/Dose: Entresto 49/51 mg BID  ICD code (if different than what is on RX):  I25.5  Previously Tried and Failed:  Lisinopril  Rationale:  EF 30-35%    Insurance Name:  [employer]  Insurance ID:        Pharmacy Information (if different than what is on RX)  Name:  See RX  Phone:

## 2018-11-06 NOTE — NURSING NOTE
Chief Complaint   Patient presents with     Follow Up For     53yr old male with a h/o chronic systolic heart failure secondary to ICM s/p mitraclip and complicated revascularization presenting for HF follow-up with labs prior.     Vitals were taken and medications were reconciled.     Brit Sylvester RMA  11:12 AM

## 2018-11-06 NOTE — PROGRESS NOTES
"  HPI: Luke is a 53 year old gentleman with a past medical history of ICM, CAD with a RCA STEMI s/p PCI x 7 to RCA, left circumflex (now ), and LAD on 3/27/17 complicated by PEDRO, sepsis, sacral ulcer, DVT, bilateral hemispheric infarcts secondary to watershed ischemia, and cardiogenic shock s/p IABP, who was later transitioned to a subclavian balloon pump and underwent mitral clip for ischemic MR.  He returns for routine follow up.     He was last seen by my colleague in October.  He was hypertensive at the time and his lisinopril was increased to 5 mg twice daily for better BP control as well as afterload reduction.      Since his last visit, Luke states through a professional  that he is feeling more congested and feels more \"puffy\" than before.  Weight is typically 123 lbs at home which is stable.  He continues to have leg fatigue and edema after standing at work for 10 hours.  Swelling resolves with resting a couple of hours and elevating legs.  He denies SOB at rest, RAYMOND, PND, orthopnea, chest pain, palpitations, or lightheadedness.  He stays within his recommended sodium and fluid guidelines.  He has quit all caffeine as well.  He has been compliant with taking his meds.      PAST MEDICAL HISTORY:  Past Medical History:   Diagnosis Date     Acne      CAD (coronary artery disease) 2017    RCA STEMI s/p balloon angioplasty and multiple Sofie to RCA, LCx, and LAD     Cardiogenic shock (H)      DVT (deep venous thrombosis) (H) 2017    left internal jugular (line-associated); left common femoral; on coumadin     Ischemic cardiomyopathy 2017    EF 30-35%     Ischemic stroke (H) 2017    no residual deficits     Lower GI bleed 2017    due to rectal ulcer     Mitral valve insufficiency, ischemic 2017    s/p Mitral Clip placement      Skin ulcer of sacrum (H)      Systolic heart failure (H)      Type 2 diabetes mellitus (H)        FAMILY HISTORY:  Family History   Problem Relation Age of Onset     " Hypertension Mother      Type 2 Diabetes Father      Hypertension Father      Myocardial Infarction No family hx of      Cerebrovascular Disease No family hx of      Coronary Artery Disease Early Onset No family hx of      Colon Cancer No family hx of      Prostate Cancer No family hx of        SOCIAL HISTORY:  Social History     Social History     Marital status:      Spouse name: N/A     Number of children: N/A     Years of education: N/A     Occupational History     H[employer]      Social History Main Topics     Smoking status: Former Smoker     Packs/day: 0.50     Years: 30.00     Types: Cigarettes     Quit date: 1/1/2017     Smokeless tobacco: Never Used     Alcohol use No     Drug use: No     Sexual activity: Not Currently     Other Topics Concern     None     Social History Narrative    . Remarried.    4 children with first marriage.    2 grand children.    Walks daily, but no formal exercise.            CURRENT MEDICATIONS:  Current Outpatient Prescriptions   Medication Sig Dispense Refill     aspirin 81 MG chewable tablet Take 1 tablet (81 mg) by mouth daily 90 tablet 3     atorvastatin (LIPITOR) 40 MG tablet 1 TABLET (40 MG) BY ORAL OR FEEDING TUBE ROUTE DAILY 60 tablet 0     bumetanide (BUMEX) 0.5 MG tablet Take 1 tablet (0.5 mg) by mouth every other day 45 tablet 3     cetirizine (ZYRTEC) 10 MG tablet TAKE 1 TABLET (10 MG) BY MOUTH DAILY 60 tablet 6     digoxin (LANOXIN) 125 MCG tablet Take one tablet on M, W, F, Sunday. 60 tablet 5     glipiZIDE (GLUCOTROL) 5 MG tablet Take 1 tablet (5 mg) by mouth 2 times daily (before meals) 180 tablet 3     lisinopril (PRINIVIL/ZESTRIL) 2.5 MG tablet Take 2 tablets (5 mg) by mouth 2 times daily 90 tablet 11     metFORMIN (GLUCOPHAGE-XR) 500 MG 24 hr tablet Take 1 tablet (500 mg) by mouth daily (with dinner) 180 tablet 3     metoprolol succinate (TOPROL-XL) 50 MG 24 hr tablet Take 3 tablets (150 mg) by mouth daily 180 tablet 3     fluticasone  "(FLONASE) 50 MCG/ACT spray Spray 2 sprays into both nostrils daily (Patient not taking: Reported on 11/6/2018) 3 Bottle 3     insulin pen needle (BD KYLEE U/F) 32G X 4 MM Use 3 daily or as directed. (Patient not taking: Reported on 11/6/2018) 100 each prn     ONETOUCH ULTRA test strip USE TO TEST BLOOD SUGAR 3 TIMES DAILY OR AS DIRECTED. (Patient not taking: Reported on 10/15/2018) 100 strip 3       ROS:  Constitutional: Denies fever, chills, or diaphoresis. +weight gain.    ENT: Denies visual disturbance, hearing loss, epistaxis, vertigo,   Respiratory:  See HPI  Cardiovascular: As per HPI.   GI: Denies nausea, vomiting, diarrhea, hematemesis, melena, or hematochezia.   : Denies urinary frequency, dysuria, or hematuria.   Integument: Negative for bruising, rash, or pruritis.  Psychiatric: Negative for anxiety, depression, sleep disturbance, or mood changes.   Neuro: Negative for headaches, syncope, numbness or tingling.   Endocrinology: Negative for thyroid disorder, night sweats, diabetes.   Musculoskeletal: See HPI    EXAM:  BP (!) 150/94 (BP Location: Left arm, Patient Position: Chair, Cuff Size: Adult Regular)  Pulse 71  Ht 1.575 m (5' 2\")  Wt 59.4 kg (131 lb)  SpO2 98%  BMI 23.96 kg/m2  General: alert, articulate, and in no acute distress.  HEENT: normocephalic, atraumatic, anicteric sclera, EOMI, mucosa moist, no cyanosis.   Neck: neck supple. JVP 11 cm.   Heart: regular rhythm, normal S1/S2, no murmur, gallop, rub.  Precordium quiet with normal PMI.     Lungs: clear, no rales, ronchi, or wheezing.  No accessory muscle use, respirations unlabored.   Abdomen: Distended but soft, non-tender, bowel sounds present, no hepatomegaly  Extremities: 1+ pitting edema.   No cyanosis.  Extremities warm.  2+ pedal pulses.   Neurological: alert and oriented x 3.  normal speech and affect, no gross motor deficits  Skin:  No ecchymoses or rashes.     Labs:  CBC RESULTS:  Lab Results   Component Value Date    WBC 10.7 " 10/01/2018    RBC 4.97 10/01/2018    HGB 14.9 10/01/2018    HCT 46.3 10/01/2018    MCV 93 10/01/2018    MCH 30.0 10/01/2018    MCHC 32.2 10/01/2018    RDW 14.6 10/01/2018     10/01/2018       CMP RESULTS:  Lab Results   Component Value Date     2018    POTASSIUM 3.7 2018    CHLORIDE 106 2018    CO2 26 2018    ANIONGAP 10 2018    GLC 62 (L) 2018    BUN 26 2018    CR 1.19 2018    GFRESTIMATED 64 2018    GFRESTBLACK 78 2018    ROB 8.8 2018    BILITOTAL 1.4 (H) 10/01/2018    ALBUMIN 4.2 10/01/2018    ALKPHOS 67 10/01/2018    ALT 34 10/01/2018    AST 21 10/01/2018        INR RESULTS:  Lab Results   Component Value Date    INR 2.3 (A) 2017    INR 2.36 (H) 2017       No components found for: CK  Lab Results   Component Value Date    MAG 2.4 (H) 2017     Lab Results   Component Value Date    NTBNP 1599 (H) 2017     @BRIEFLABR (dig)@    Most recent echocardiogram:  Recent Results (from the past 8760 hour(s))   Echocardiogram Complete    Narrative    [id number]  [id number]  [id number]  [id number]^SHANTELL^SYDNEE^           [facility]  Diagnostic and Treamtent-3rd Floor  [address] [place] [id number]     Name: SYDNIE SIERRA  MRN: [id number]  : 1965  Study Date: 2018 07:55 AM  Age: 52 yrs  Gender: Male  Patient Location: [facility]  Reason For Study: 1 year s/p MitraClip  Ordering Physician: SYDNEE STINSON  Referring Physician: SYDNEE STINSON  Performed By: RAE Agarwal     BSA: 1.6 m2  Height: 62 in  Weight: 126 lb  BP: 130/91 mmHg  _____________________________________________________________________________  __        Procedure  Echocardiogram with two-dimensional, color and spectral Doppler performed.  _____________________________________________________________________________  __        Interpretation Summary  The Ejection Fraction is estimated at  30-35%. Left ventricular size is normal.  Global right ventricular function is mildly reduced. The right ventricle is  normal size.  s/p MitralClip with MV mPG of 4mmHg at 90 bpm. Trace mitral insufficiency is  present.  Moderate tricuspid insufficiency is present.  This study was compared with the study from 12/15/17 .  There has been no change.  _____________________________________________________________________________  __        Left Ventricle  Left ventricular size is normal. Left ventricular wall thickness is normal.  The Ejection Fraction is estimated at 30-35%. Grade III or advanced diastolic  dysfunction. Akinesis of the basal to apical inferior, basal infero-lateral  and infero-septal segments.     Right Ventricle  The right ventricle is normal size. Global right ventricular function is  mildly reduced. A pacemaker lead is noted in the right ventricle.     Atria  Both atria appear normal. The atrial septum is intact as assessed by color  Doppler . A pacemaker lead is noted in the right atrium.     Mitral Valve  Trace mitral insufficiency is present. s/p MitralClip with MV mPG of 4mmHg at  90 bpm.        Aortic Valve  Aortic valve is normal in structure and function. The aortic valve is  tricuspid.     Tricuspid Valve  Moderate tricuspid insufficiency is present. The right ventricular systolic  pressure is approximated at 41.7 mmHg plus the right atrial pressure. Etiology  of TR is leaflet restriction by ICD lead.     Pulmonic Valve  The pulmonic valve is normal. Trace pulmonic insufficiency is present.     Vessels  The aorta root is normal. The pulmonary artery is normal. The inferior vena  cava was normal in size with preserved respiratory variability.     Pericardium  No pericardial effusion is present.        Compared to Previous Study  This study was compared with the study from 12/15/17 . There has been no  change.     Attestation  I have personally viewed the imaging and agree with the  interpretation and  report as documented by the fellow, Kirby Rodriguez, and/or edited by me.  _____________________________________________________________________________  __     MMode/2D Measurements & Calculations  IVSd: 0.84 cm  LVIDd: 5.0 cm  LVIDs: 4.5 cm  LVPWd: 0.89 cm  FS: 9.6 %  LV mass(C)d: 149.1 grams  LV mass(C)dI: 94.9 grams/m2  Ao root diam: 2.6 cm  asc Aorta Diam: 3.0 cm  LVOT diam: 2.0 cm  LVOT area: 3.1 cm2     EF(MOD-bp): 26.7 %  LA Volume (BP): 42.8 ml     LA Volume Index (BP): 27.3 ml/m2  RWT: 0.36        Doppler Measurements & Calculations  MV E max frank: 129.0 cm/sec  MV A max frank: 62.4 cm/sec  MV E/A: 2.1  MV max PG: 10.9 mmHg  MV mean PG: 3.6 mmHg  MV V2 VTI: 26.1 cm  MVA(VTI): 1.4 cm2  MV dec time: 0.10 sec  Ao V2 max: 97.9 cm/sec  Ao max P.0 mmHg  Ao V2 mean: 71.0 cm/sec  Ao mean P.3 mmHg  Ao V2 VTI: 18.3 cm  MICHELLE(I,D): 2.0 cm2  MICHELLE(V,D): 2.2 cm2  LV V1 max P.9 mmHg  LV V1 max: 68.9 cm/sec  LV V1 VTI: 12.0 cm  SV(LVOT): 37.2 ml  SI(LVOT): 23.7 ml/m2  PA V2 max: 65.2 cm/sec  PA max P.7 mmHg  PA acc time: 0.13 sec  PI end-d frank: 160.2 cm/sec  TR max frank: 322.8 cm/sec  TR max P.7 mmHg  AV Frank Ratio (DI): 0.70     MICHELLE Index (cm2/m2): 1.3  E/E' av.1  Lateral E/e': 32.3  Medial E/e': 35.8     _____________________________________________________________________________  __           Report approved by: Isabel Love 2018 09:49 AM      Echocardiogram Limited    Narrative    [id number]  [facility]  [id number]  [id number]^MEGHAN^IVA^AMBER           [facility]  Diagnostic and [facility] Floor  [address] [place] [id number]     Name: SYDNIE SIERRA  MRN: [id number]  : 1965  Study Date: 12/15/2017 10:52 AM  Age: 52 yrs  Gender: Male  Patient Location: [facility]  Reason For Study: s/p Karena Clip  Ordering Physician: IVA CHRISTIANSON  Referring Physician: IVA CHRISTIANSON  Performed By: Sandra Fields RDCS      BSA: 1.6 m2  Height: 62 in  Weight: 123 lb  BP: 100/74 mmHg  _____________________________________________________________________________  __        Procedure  Limited Echocardiogram with portions of two-dimensional, color and spectral  Doppler performed.  _____________________________________________________________________________  __        Interpretation Summary  Moderately reduced systolic function. EF 30-35%. Global hypokinesis with  akinesis of the basal-mid inferior and basal-mid inferolateral walls.  Global right ventricular function is mildly reduced.  Status post transcatheter mitral valve repair with Mitraclip. The clip is  attached to the leaflets. There is trace to mild mitral regurgitation. Mean  gradient 5 mmHg at heart rate 97 bpm.  Pulmonary hypertension with right ventricular systolic pressure 41mmHg above  the right atrial pressure present.  No pericardial effusion.     Compared to the prior study 8/10/17 there has been no significant change.  _____________________________________________________________________________  __        Left Ventricle  Left ventricular wall thickness is normal. Mild left ventricular dilation is  present. Global hypokinesis with akinesis of the basal-mid inferior and basal-  mid inferolateral walls. Moderately reduced systolic function. EF 30-35%.  Diastolic function not assessed.     Right Ventricle  The right ventricle is normal size. Global right ventricular function is  mildly reduced. A pacemaker lead is noted in the right ventricle.     Atria  Both atria appear normal.     Mitral Valve  Status post transcatheter mitral valve repair with Mitraclip. The clip is  attached to the leaflets. There is trace to mild mitral regurgitation. Mean  gradient 5 mmHg at heart rate 97 bpm.        Aortic Valve  Aortic valve is normal in structure and function.     Tricuspid Valve  Mild tricuspid insufficiency is present. Right ventricular systolic pressure  is 41mmHg above the  right atrial pressure.     Pulmonic Valve  The pulmonic valve is normal.     Vessels  The aorta root is normal. The inferior vena cava was normal in size with  preserved respiratory variability. Estimated mean right atrial pressure is 3  mmHg.     Pericardium  No pericardial effusion is present.        Attestation  I have personally viewed the imaging and agree with the interpretation and  report as documented by the fellow, David Hill, and/or edited by me.  _____________________________________________________________________________  __  MMode/2D Measurements & Calculations     IVSd: 0.72 cm  LVIDd: 4.9 cm  LVIDs: 4.2 cm  LVPWd: 0.73 cm  FS: 14.2 %  EDV(Teich): 115.5 ml  ESV(Teich): 80.6 ml  LV mass(C)d: 118.2 grams  LV mass(C)dI: 76.1 grams/m2     EF(MOD-bp): 34.1 %  LA Volume (BP): 49.5 ml  LA Volume Index (BP): 31.9 ml/m2  RWT: 0.30           Doppler Measurements & Calculations  MV E max isabel: 126.4 cm/sec  MV A max isabel: 46.5 cm/sec  MV E/A: 2.7  MV max P.7 mmHg  MV mean P.6 mmHg  MV V2 VTI: 26.5 cm  MV dec time: 0.13 sec  TR max isabel: 321.2 cm/sec  TR max P.3 mmHg     _____________________________________________________________________________  __           Report approved by: Isabel Alex 12/15/2017 12:24 PM            Assessment and Plan:    In summary, Luke is a 53-year-old gentleman with ischemic cardiomyopathy who appears hypervolemic today.  I have increased his Bumex to 0.5 mg daily, added back Kdur 20 meq daily, and changed his lisinopril to Entresto 49/51 mg twice daily.      He is interested in enrolling in the PROVIDE study looking at Entresto vs Lisinopril, and was enrolled today.  We also discussed enrolling him in the future for the [facility] research study which he qualifies for. He would like to discuss with his primary MD and will contact us if he would like to enroll in the study.     He will return to clinic in 2 weeks with labs prior to reassess his response to these  changes.  He was instructed to call us in the interim if his symptoms change or worsen.    1.  Chronic systolic heart failure secondary to ischemic cardiomyopathy.  Stage D NYHA Class IIIB  ACEi/ARB: yes, change to Entresto 49/51 mg twice daily. Hold Lisinopril for 36 hours prior to starting Entresto.  BB: yes, Toprol  mg daily.   Aldosterone antagonist: Deferred due to worsening renal function with previous attempts.   SCD prophylaxis: ICD  Fluid status: hypervolemic. Diurese as outlined above.   Anticoagulation: None.   Antiplatelet:  ASA dose   Sleep apnea:  Not documented.  NSAID use:  Contraindicated.  Avoid use.  Remote Monitoring: None.   RV support:  Digoxin.     2.  CAD:  Stable.  Continue Plavix, ASA, Metoprolol, Lisinopril, and Atorvastatin        3.  Mitral valve repair s/p mitral clip:  Follow up with Dr. Amaral as scheduled in April 2019.       4.  Lower extremity edema:  Elevate legs when resting.  Compression stockings. Diurese as outlined above.       5. HTN:  159/99 with repeat BP of 150/94.  Diurese as outlined above. Change to Entresto and titrate as tolerated.      6. Follow up:  [facility] in two weeks with BMP prior. Dr. Amaral as scheduled in April.       Vandana VENEGAS, BRANDON  [facility]

## 2018-11-06 NOTE — MR AVS SNAPSHOT
After Visit Summary   11/6/2018    Luke Henao    MRN: [id number]           Patient Information     Date Of Birth          1965        Visit Information        Provider Department      11/6/2018 10:45 AM Vandana Zambrano NP; LANGUAGE [facility]        Today's Diagnoses     Ischemic cardiomyopathy          Care Instructions    You were seen today in the Cardiovascular Clinic at the [facility].     Cardiology Providers you saw during your visit: Vandana Zambrano APRN, CNP      Follow up and medication changes:  1. Start taking Bumex 0.5 mg EVERY day.     2. Return to clinic in one week with labs prior    3. We are starting a new medication called Entresto. It might require a prior authorization from your insurance. When it is ready at your pharmacy, do not take it until you hear from us. You will need to STOP Lisinopril for 36 hours before starting Entresto.     4. Labs one week after starting Entresto.       Results for LUKE HENAO (MRN [id number]) as of 11/6/2018 11:23   Ref. Range 11/6/2018 10:25   Sodium Latest Ref Range: 133 - 144 mmol/L 141   Potassium Latest Ref Range: 3.4 - 5.3 mmol/L 3.7   Chloride Latest Ref Range: 94 - 109 mmol/L 106   Carbon Dioxide Latest Ref Range: 20 - 32 mmol/L 26   Urea Nitrogen Latest Ref Range: 7 - 30 mg/dL 26   Creatinine Latest Ref Range: 0.66 - 1.25 mg/dL 1.19   GFR Estimate Latest Ref Range: >60 mL/min/1.7m2 64   GFR Estimate If Black Latest Ref Range: >60 mL/min/1.7m2 78   Calcium Latest Ref Range: 8.5 - 10.1 mg/dL 8.8   Anion Gap Latest Ref Range: 3 - 14 mmol/L 10   Glucose Latest Ref Range: 70 - 99 mg/dL 62 (L)       Please limit your fluid intake to 2 L (68 ounces) daily.  2 Liters a day = 8.5 cups, or 68 ounces.  Please limit your salt intake to 2 grams a day or less.     If you gain 2# in 24 hours or 5# in one week call Soraya Messina RN so we can adjust your medications as needed over the phone.     Please feel free to  "call me with any questions or concerns.       Soraya Messina, RN CHFN  [facility]  Cardiology Care Coordinator-Heart Failure Clinic     Questions and [phone].   First press #1 for the University and then press #3 for \"Medical Questions\" to reach us Cardiology Nurses.      On Call Cardiologist for after hours or on weekends: [phone]   option #4 and ask to speak to the on-call Cardiologist. Inform them you are a CORE/heart failure patient at the [place].           If you need a medication refill please contact your pharmacy.  Please allow 3 business days for your refill to be completed.  _______________________________________________________  C.O.R.E. CLINIC Cardiomyopathy, Optimization, Rehabilitation, Education   The C.O.R.E. CLINIC is a heart failure specialty clinic within the [facility] where you will work with specialized nurse practitioners dedicated to helping patients with heart failure carefully adjust medications, receive education, and learn who and when to call if symptoms develop. They specialize in helping you better understand your condition, slow the progression of your disease, improve the length and quality of your life, help you detect future heart problems before they become life threatening, and avoid hospitalizations.  As always, thank you for trusting us with your health care needs!             Follow-ups after your visit        Additional Services     [facility] with [facility]                 Your next 10 appointments already scheduled     2018  8:30 AM CST   Lab with [facility]   [facility])    [address] [place] [id number]   [phone]            2018  9:00 AM CST   (Arrive by 8:45 AM)   CORE RETURN with Vandana Zambrano NP   [facility])    [address] [place] [address]  [address]  [facility] [id number] [phone]            Apr 02, 2019 10:45 AM CDT   Office Visit with Shanna Church MD   [facility]    [address] [id number] [phone]           Bring a current list of meds and any records pertaining to this visit. For Physicals, please bring immunization records and any forms needing to be filled out. Please arrive 10 minutes early to complete paperwork.            Apr 03, 2019  9:30 AM CDT   Ech Complete with UCECHCR1   [facility] ([facility])    [address]  3rd Floor  [facility] [address] [phone]           1.  Please bring or wear a comfortable two-piece outfit. 2.  You may eat, drink and take your normal medicines. 3.  For any questions that cannot be answered, please contact the ordering physician 4.  Please do not wear perfumes or scented lotions on the day of your exam.            Apr 03, 2019 10:00 AM CDT   (Arrive by 9:45 AM)   Return Visit with Amanda Amaral MD   [facility] ([facility])    [address]  [address]  [facility] [id number] [phone]            Apr 03, 2019 10:30 AM CDT   (Arrive by 10:15 AM)   Implanted Defibulator with  Cv Device 1   [facility] ([facility])    [address]  3rd [facility]  [address]                 Future tests that were ordered for you today     Open Future Orders        Priority Expected Expires Ordered    Follow-Up with CORE Clinic Routine 11/13/2018 2/11/2019 11/6/2018    Basic metabolic panel Routine 11/13/2018 11/20/2018 11/6/2018            Who to contact     If you have questions or need follow up information about today's clinic visit or your schedule please contact [facility] directly at [phone].  Normal or non-critical lab and imaging results will be communicated to you by  "MyChart, letter or phone within 4 business days after the clinic has received the results. If you do not hear from us within 7 days, please contact the clinic through [employer] or phone. If you have a critical or abnormal lab result, we will notify you by phone as soon as possible.  Submit refill requests through [employer] or call your pharmacy and they will forward the refill request to us. Please allow 3 business days for your refill to be completed.          Additional Information About Your Visit        [employer] Information     [employer] lets you send messages to your doctor, view your test results, renew your prescriptions, schedule appointments and more. To sign up, go to [email or web address] . Click on \"Log in\" on the left side of the screen, which will take you to the [place] page. Then click on \"Sign up Now\" on the right side of the page.     You will be asked to enter the access code listed below, as well as some personal information. Please follow the directions to create your username and password.     Your access code is: [email or web address]  Expires: 2018  5:31 AM     Your access code will  in 90 days. If you need help or a new code, please call your [place] clinic or [phone].        Care EveryWhere ID     This is your Care EveryWhere ID. This could be used by other organizations to access your [place] medical records  [id number]        Your Vitals Were     Pulse Height Pulse Oximetry BMI (Body Mass Index)          71 1.575 m (5' 2\") 98% 23.96 kg/m2         Blood Pressure from Last 3 Encounters:   18 (!) 150/94   10/15/18 (!) 147/95   10/15/18 (!) 147/95    Weight from Last 3 Encounters:   18 59.4 kg (131 lb)   10/15/18 58.3 kg (128 lb 8.5 oz)   10/15/18 58.3 kg (128 lb 9.6 oz)              We Performed the Following     Follow-Up with [facility]          Today's Medication Changes          These changes are accurate as of 18 12:16 PM.  If you have any questions, ask your " nurse or doctor.               These medicines have changed or have updated prescriptions.        Dose/Directions    bumetanide 0.5 MG tablet   Commonly known as:  BUMEX   This may have changed:  when to take this   Used for:  Ischemic cardiomyopathy   Changed by:  Vandana Zambrano, NP        Dose:  0.5 mg   Take 1 tablet (0.5 mg) by mouth daily   Quantity:  45 tablet   Refills:  3            Where to get your medicines      These medications were sent to [facility]/pharmacy #[id number] - [facility] [address] [address]     Phone:  [phone]     bumetanide 0.5 MG tablet                Primary Care Provider Office Phone # Fax #    Shanna Church [phone]       [address] [address]        Equal Access to Services     [facility] Hospital[facility] : Hadii linda el hadasho Soericka, waaxda luqadaha, qaybta kaalmada adejoyajeannie, karen nettles . So [facility] [phone].    ATENCIÓN: Si habla español, tiene a suarez disposición servicios gratuitos de asistencia lingüística. [facility] [phone].    We comply with applicable federal civil rights laws and [place] laws. We do not discriminate on the basis of race, color, national origin, age, disability, sex, sexual orientation, or gender identity.            Thank you!     Thank you for choosing [facility]  for your care. Our goal is always to provide you with excellent care. Hearing back from our patients is one way we can continue to improve our services. Please take a few minutes to complete the written survey that you may receive in the mail after your visit with us. Thank you!             Your Updated Medication List - Protect others around you: Learn how to safely use, store and throw away your medicines at www.disposemymeds.org.          This list is accurate as of 11/6/18 12:16 PM.  Always use your most recent med list.                   Brand Name Dispense Instructions for use  Diagnosis    aspirin 81 MG chewable tablet     90 tablet    Take 1 tablet (81 mg) by mouth daily    Coronary artery disease involving native coronary artery of native heart without angina pectoris       atorvastatin 40 MG tablet    LIPITOR    60 tablet    1 TABLET (40 MG) BY ORAL OR FEEDING TUBE ROUTE DAILY    Coronary artery disease involving native coronary artery of native heart without angina pectoris, Cardiogenic shock (H)       bumetanide 0.5 MG tablet    BUMEX    45 tablet    Take 1 tablet (0.5 mg) by mouth daily    Ischemic cardiomyopathy       cetirizine 10 MG tablet    zyrTEC    60 tablet    TAKE 1 TABLET (10 MG) BY MOUTH DAILY    Environmental allergies       digoxin 125 MCG tablet    LANOXIN    60 tablet    Take one tablet on M, W, F, Sunday.    Coronary artery disease involving native coronary artery of native heart without angina pectoris       fluticasone 50 MCG/ACT spray    FLONASE    3 Bottle    Spray 2 sprays into both nostrils daily    Nasal congestion       glipiZIDE 5 MG tablet    GLUCOTROL    180 tablet    Take 1 tablet (5 mg) by mouth 2 times daily (before meals)    Type 2 diabetes mellitus without complication, without long-term current use of insulin (H)       insulin pen needle 32G X 4 MM    BD KYLEE U/F    100 each    Use 3 daily or as directed.    Diabetes mellitus type 2, insulin dependent (H)       lisinopril 2.5 MG tablet    PRINIVIL/Zestril    90 tablet    Take 2 tablets (5 mg) by mouth 2 times daily    Ischemic cardiomyopathy       metFORMIN 500 MG 24 hr tablet    GLUCOPHAGE-XR    180 tablet    Take 1 tablet (500 mg) by mouth daily (with dinner)    Type 2 diabetes mellitus without complication, without long-term current use of insulin (H)       metoprolol succinate 50 MG 24 hr tablet    TOPROL-XL    180 tablet    Take 3 tablets (150 mg) by mouth daily    Chronic systolic congestive heart failure (H)       ONETOUCH ULTRA test strip   Generic drug:  blood glucose monitoring     100  strip    USE TO TEST BLOOD SUGAR 3 TIMES DAILY OR AS DIRECTED.    Diabetes mellitus type 2, insulin dependent (H)

## 2018-11-06 NOTE — LETTER
"11/6/2018      RE: Luke Henao  [address]  [facility] [address]       Dear Colleague,    Thank you for the opportunity to participate in the care of your patient, Luke Henao, at the [facility] at [facility]. Please see a copy of my visit note below.      HPI: Luke is a 53 year old gentleman with a past medical history of ICM, CAD with a RCA STEMI s/p PCI x 7 to RCA, left circumflex (now ), and LAD on 3/27/17 complicated by PEDRO, sepsis, sacral ulcer, DVT, bilateral hemispheric infarcts secondary to watershed ischemia, and cardiogenic shock s/p IABP, who was later transitioned to a subclavian balloon pump and underwent mitral clip for ischemic MR.  He returns for routine follow up.     He was last seen by my colleague in October.  He was hypertensive at the time and his lisinopril was increased to 5 mg twice daily for better BP control as well as afterload reduction.      Since his last visit, Luke states through a professional  that he is feeling more congested and feels more \"puffy\" than before.  Weight is typically 123 lbs at home which is stable.  He continues to have leg fatigue and edema after standing at work for 10 hours.  Swelling resolves with resting a couple of hours and elevating legs.  He denies SOB at rest, RAYMOND, PND, orthopnea, chest pain, palpitations, or lightheadedness.  He stays within his recommended sodium and fluid guidelines.  He has quit all caffeine as well.  He has been compliant with taking his meds.      PAST MEDICAL HISTORY:  Past Medical History:   Diagnosis Date     Acne      CAD (coronary artery disease) 2017    RCA STEMI s/p balloon angioplasty and multiple Sofie to RCA, LCx, and LAD     Cardiogenic shock (H)      DVT (deep venous thrombosis) (H) 2017    left internal jugular (line-associated); left common femoral; on coumadin     Ischemic cardiomyopathy 2017    EF 30-35%     Ischemic stroke (H) 2017    no residual " deficits     Lower GI bleed 2017    due to rectal ulcer     Mitral valve insufficiency, ischemic 2017    s/p Mitral Clip placement      Skin ulcer of sacrum (H)      Systolic heart failure (H)      Type 2 diabetes mellitus (H)        FAMILY HISTORY:  Family History   Problem Relation Age of Onset     Hypertension Mother      Type 2 Diabetes Father      Hypertension Father      Myocardial Infarction No family hx of      Cerebrovascular Disease No family hx of      Coronary Artery Disease Early Onset No family hx of      Colon Cancer No family hx of      Prostate Cancer No family hx of        SOCIAL HISTORY:  Social History     Social History     Marital status:      Spouse name: N/A     Number of children: N/A     Years of education: N/A     Occupational History     Hearing Aid [employer]      Social History Main Topics     Smoking status: Former Smoker     Packs/day: 0.50     Years: 30.00     Types: Cigarettes     Quit date: 1/1/2017     Smokeless tobacco: Never Used     Alcohol use No     Drug use: No     Sexual activity: Not Currently     Other Topics Concern     None     Social History Narrative    . Remarried.    4 children with first marriage.    2 grand children.    Walks daily, but no formal exercise.            CURRENT MEDICATIONS:  Current Outpatient Prescriptions   Medication Sig Dispense Refill     aspirin 81 MG chewable tablet Take 1 tablet (81 mg) by mouth daily 90 tablet 3     atorvastatin (LIPITOR) 40 MG tablet 1 TABLET (40 MG) BY ORAL OR FEEDING TUBE ROUTE DAILY 60 tablet 0     bumetanide (BUMEX) 0.5 MG tablet Take 1 tablet (0.5 mg) by mouth every other day 45 tablet 3     cetirizine (ZYRTEC) 10 MG tablet TAKE 1 TABLET (10 MG) BY MOUTH DAILY 60 tablet 6     digoxin (LANOXIN) 125 MCG tablet Take one tablet on M, W, F, Sunday. 60 tablet 5     glipiZIDE (GLUCOTROL) 5 MG tablet Take 1 tablet (5 mg) by mouth 2 times daily (before meals) 180 tablet 3     lisinopril (PRINIVIL/ZESTRIL) 2.5 MG  "tablet Take 2 tablets (5 mg) by mouth 2 times daily 90 tablet 11     metFORMIN (GLUCOPHAGE-XR) 500 MG 24 hr tablet Take 1 tablet (500 mg) by mouth daily (with dinner) 180 tablet 3     metoprolol succinate (TOPROL-XL) 50 MG 24 hr tablet Take 3 tablets (150 mg) by mouth daily 180 tablet 3     fluticasone (FLONASE) 50 MCG/ACT spray Spray 2 sprays into both nostrils daily (Patient not taking: Reported on 11/6/2018) 3 Bottle 3     insulin pen needle (BD KYLEE U/F) 32G X 4 MM Use 3 daily or as directed. (Patient not taking: Reported on 11/6/2018) 100 each prn     ONETOUCH ULTRA test strip USE TO TEST BLOOD SUGAR 3 TIMES DAILY OR AS DIRECTED. (Patient not taking: Reported on 10/15/2018) 100 strip 3       ROS:  Constitutional: Denies fever, chills, or diaphoresis. +weight gain.    ENT: Denies visual disturbance, hearing loss, epistaxis, vertigo,   Respiratory:  See HPI  Cardiovascular: As per HPI.   GI: Denies nausea, vomiting, diarrhea, hematemesis, melena, or hematochezia.   : Denies urinary frequency, dysuria, or hematuria.   Integument: Negative for bruising, rash, or pruritis.  Psychiatric: Negative for anxiety, depression, sleep disturbance, or mood changes.   Neuro: Negative for headaches, syncope, numbness or tingling.   Endocrinology: Negative for thyroid disorder, night sweats, diabetes.   Musculoskeletal: See HPI    EXAM:  BP (!) 150/94 (BP Location: Left arm, Patient Position: Chair, Cuff Size: Adult Regular)  Pulse 71  Ht 1.575 m (5' 2\")  Wt 59.4 kg (131 lb)  SpO2 98%  BMI 23.96 kg/m2  General: alert, articulate, and in no acute distress.  HEENT: normocephalic, atraumatic, anicteric sclera, EOMI, mucosa moist, no cyanosis.   Neck: neck supple. JVP 11 cm.   Heart: regular rhythm, normal S1/S2, no murmur, gallop, rub.  Precordium quiet with normal PMI.     Lungs: clear, no rales, ronchi, or wheezing.  No accessory muscle use, respirations unlabored.   Abdomen: Distended but soft, non-tender, bowel sounds " present, no hepatomegaly  Extremities: 1+ pitting edema.   No cyanosis.  Extremities warm.  2+ pedal pulses.   Neurological: alert and oriented x 3.  normal speech and affect, no gross motor deficits  Skin:  No ecchymoses or rashes.     Labs:  CBC RESULTS:  Lab Results   Component Value Date    WBC 10.7 10/01/2018    RBC 4.97 10/01/2018    HGB 14.9 10/01/2018    HCT 46.3 10/01/2018    MCV 93 10/01/2018    MCH 30.0 10/01/2018    MCHC 32.2 10/01/2018    RDW 14.6 10/01/2018     10/01/2018       CMP RESULTS:  Lab Results   Component Value Date     2018    POTASSIUM 3.7 2018    CHLORIDE 106 2018    CO2 26 2018    ANIONGAP 10 2018    GLC 62 (L) 2018    BUN 26 2018    CR 1.19 2018    GFRESTIMATED 64 2018    GFRESTBLACK 78 2018    ROB 8.8 2018    BILITOTAL 1.4 (H) 10/01/2018    ALBUMIN 4.2 10/01/2018    ALKPHOS 67 10/01/2018    ALT 34 10/01/2018    AST 21 10/01/2018        INR RESULTS:  Lab Results   Component Value Date    INR 2.3 (A) 2017    INR 2.36 (H) 2017       No components found for: CK  Lab Results   Component Value Date    MAG 2.4 (H) 2017     Lab Results   Component Value Date    NTBNP 1599 (H) 2017     @BRIEFLABR (dig)@    Most recent echocardiogram:  Recent Results (from the past 8760 hour(s))   Echocardiogram Complete    Narrative    [id number]  ECH19  [id number]  [id number]^SHANTELL^SYDNEE^           [facility]  Diagnostic and Treamtent-3rd Floor  [address] [place] [address]     Name: SYDNIE SIERRA  MRN: [id number]  : 1965  Study Date: 2018 07:55 AM  Age: 52 yrs  Gender: Male  [place] [facility] [place] For Study: 1 year s/p MitraClip  Ordering Physician: SYDNEE STINSON  Referring Physician: SYDNEE STINSON  Performed By: RAE Agarwal     BSA: 1.6 m2  Height: 62 in  Weight: 126 lb  BP: 130/91  mmHg  _____________________________________________________________________________  __        Procedure  Echocardiogram with two-dimensional, color and spectral Doppler performed.  _____________________________________________________________________________  __        Interpretation Summary  The Ejection Fraction is estimated at 30-35%. Left ventricular size is normal.  Global right ventricular function is mildly reduced. The right ventricle is  normal size.  s/p MitralClip with MV mPG of 4mmHg at 90 bpm. Trace mitral insufficiency is  present.  Moderate tricuspid insufficiency is present.  This study was compared with the study from 12/15/17 .  There has been no change.  _____________________________________________________________________________  __        Left Ventricle  Left ventricular size is normal. Left ventricular wall thickness is normal.  The Ejection Fraction is estimated at 30-35%. Grade III or advanced diastolic  dysfunction. Akinesis of the basal to apical inferior, basal infero-lateral  and infero-septal segments.     Right Ventricle  The right ventricle is normal size. Global right ventricular function is  mildly reduced. A pacemaker lead is noted in the right ventricle.     Atria  Both atria appear normal. The atrial septum is intact as assessed by color  Doppler . A pacemaker lead is noted in the right atrium.     Mitral Valve  Trace mitral insufficiency is present. s/p MitralClip with MV mPG of 4mmHg at  90 bpm.        Aortic Valve  Aortic valve is normal in structure and function. The aortic valve is  tricuspid.     Tricuspid Valve  Moderate tricuspid insufficiency is present. The right ventricular systolic  pressure is approximated at 41.7 mmHg plus the right atrial pressure. Etiology  of TR is leaflet restriction by ICD lead.     Pulmonic Valve  The pulmonic valve is normal. Trace pulmonic insufficiency is present.     Vessels  The aorta root is normal. The pulmonary artery is normal. The  inferior vena  cava was normal in size with preserved respiratory variability.     Pericardium  No pericardial effusion is present.        Compared to Previous Study  This study was compared with the study from 12/15/17 . There has been no  change.     Attestation  I have personally viewed the imaging and agree with the interpretation and  report as documented by the fellow, Kirby Rodriguez, and/or edited by me.  _____________________________________________________________________________  __     MMode/2D Measurements & Calculations  IVSd: 0.84 cm  LVIDd: 5.0 cm  LVIDs: 4.5 cm  LVPWd: 0.89 cm  FS: 9.6 %  LV mass(C)d: 149.1 grams  LV mass(C)dI: 94.9 grams/m2  Ao root diam: 2.6 cm  asc Aorta Diam: 3.0 cm  LVOT diam: 2.0 cm  LVOT area: 3.1 cm2     EF(MOD-bp): 26.7 %  LA Volume (BP): 42.8 ml     LA Volume Index (BP): 27.3 ml/m2  RWT: 0.36        Doppler Measurements & Calculations  MV E max frank: 129.0 cm/sec  MV A max frank: 62.4 cm/sec  MV E/A: 2.1  MV max PG: 10.9 mmHg  MV mean PG: 3.6 mmHg  MV V2 VTI: 26.1 cm  MVA(VTI): 1.4 cm2  MV dec time: 0.10 sec  Ao V2 max: 97.9 cm/sec  Ao max P.0 mmHg  Ao V2 mean: 71.0 cm/sec  Ao mean P.3 mmHg  Ao V2 VTI: 18.3 cm  MICHELLE(I,D): 2.0 cm2  MICHELLE(V,D): 2.2 cm2  LV V1 max P.9 mmHg  LV V1 max: 68.9 cm/sec  LV V1 VTI: 12.0 cm  SV(LVOT): 37.2 ml  SI(LVOT): 23.7 ml/m2  PA V2 max: 65.2 cm/sec  PA max P.7 mmHg  PA acc time: 0.13 sec  PI end-d frank: 160.2 cm/sec  TR max frank: 322.8 cm/sec  TR max P.7 mmHg  AV Frank Ratio (DI): 0.70     MICHELLE Index (cm2/m2): 1.3  E/E' av.1  Lateral E/e': 32.3  Medial E/e': 35.8     _____________________________________________________________________________  __           Report approved by: Isabel Love 2018 09:49 AM      Echocardiogram Limited    Narrative    [id number]  ECH11  [id number]  [id number]^MEGHAN^IVA^AMBER           [facility]  Diagnostic and Treamtent-3rd Floor  [address] [place] [address]     Name: SYDNIE SIERRA  MRN: [id number]  : 1965  Study Date: 12/15/2017 10:52 AM  Age: 52 yrs  Gender: Male  Patient [facility] [facility] [place]  Reason For Study: s/p Karena Clip  Ordering Physician: IVA CHRISTIANSON  Referring Physician: IVA CHRISTIANSON  Performed By: Sandra Fields RDCS     BSA: 1.6 m2  Height: 62 in  Weight: 123 lb  BP: 100/74 mmHg  _____________________________________________________________________________  __        Procedure  Limited Echocardiogram with portions of two-dimensional, color and spectral  Doppler performed.  _____________________________________________________________________________  __        Interpretation Summary  Moderately reduced systolic function. EF 30-35%. Global hypokinesis with  akinesis of the basal-mid inferior and basal-mid inferolateral walls.  Global right ventricular function is mildly reduced.  Status post transcatheter mitral valve repair with Mitraclip. The clip is  attached to the leaflets. There is trace to mild mitral regurgitation. Mean  gradient 5 mmHg at heart rate 97 bpm.  Pulmonary hypertension with right ventricular systolic pressure 41mmHg above  the right atrial pressure present.  No pericardial effusion.     Compared to the prior study 8/10/17 there has been no significant change.  _____________________________________________________________________________  __        Left Ventricle  Left ventricular wall thickness is normal. Mild left ventricular dilation is  present. Global hypokinesis with akinesis of the basal-mid inferior and basal-  mid inferolateral walls. Moderately reduced systolic function. EF 30-35%.  Diastolic function not assessed.     Right Ventricle  The right ventricle is normal size. Global right ventricular function is  mildly reduced. A pacemaker lead is noted in the right ventricle.     Atria  Both atria appear normal.     Mitral Valve  Status post transcatheter mitral valve repair with Mitraclip.  The clip is  attached to the leaflets. There is trace to mild mitral regurgitation. Mean  gradient 5 mmHg at heart rate 97 bpm.        Aortic Valve  Aortic valve is normal in structure and function.     Tricuspid Valve  Mild tricuspid insufficiency is present. Right ventricular systolic pressure  is 41mmHg above the right atrial pressure.     Pulmonic Valve  The pulmonic valve is normal.     Vessels  The aorta root is normal. The inferior vena cava was normal in size with  preserved respiratory variability. Estimated mean right atrial pressure is 3  mmHg.     Pericardium  No pericardial effusion is present.        Attestation  I have personally viewed the imaging and agree with the interpretation and  report as documented by the fellow, David Hill, and/or edited by me.  _____________________________________________________________________________  __  MMode/2D Measurements & Calculations     IVSd: 0.72 cm  LVIDd: 4.9 cm  LVIDs: 4.2 cm  LVPWd: 0.73 cm  FS: 14.2 %  EDV(Teich): 115.5 ml  ESV(Teich): 80.6 ml  LV mass(C)d: 118.2 grams  LV mass(C)dI: 76.1 grams/m2     EF(MOD-bp): 34.1 %  LA Volume (BP): 49.5 ml  LA Volume Index (BP): 31.9 ml/m2  RWT: 0.30           Doppler Measurements & Calculations  MV E max isabel: 126.4 cm/sec  MV A max isabel: 46.5 cm/sec  MV E/A: 2.7  MV max P.7 mmHg  MV mean P.6 mmHg  MV V2 VTI: 26.5 cm  MV dec time: 0.13 sec  TR max isabel: 321.2 cm/sec  TR max P.3 mmHg     _____________________________________________________________________________  __           Report approved by: Isabel Alex 12/15/2017 12:24 PM            Assessment and Plan:    In summary, Luke is a 53-year-old gentleman with ischemic cardiomyopathy who appears hypervolemic today.  I have increased his Bumex to 0.5 mg daily, added back Kdur 20 meq daily, and changed his lisinopril to Entresto 49/51 mg twice daily.      He is interested in enrolling in the PROVIDE study looking at Entresto vs Lisinopril, and  was enrolled today.  We also discussed enrolling him in the future for the [facility] research study which he qualifies for. He would like to discuss with his primary MD and will contact us if he would like to enroll in the study.     He will return to clinic in 2 weeks with labs prior to reassess his response to these changes.  He was instructed to call us in the interim if his symptoms change or worsen.    1.  Chronic systolic heart failure secondary to ischemic cardiomyopathy.  Stage D NYHA Class IIIB  ACEi/ARB: yes, change to Entresto 49/51 mg twice daily. Hold Lisinopril for 36 hours prior to starting Entresto.  BB: yes, Toprol  mg daily.   Aldosterone antagonist: Deferred due to worsening renal function with previous attempts.   SCD prophylaxis: ICD  Fluid status: hypervolemic. Diurese as outlined above.   Anticoagulation: None.   Antiplatelet:  ASA dose   Sleep apnea:  Not documented.  NSAID use:  Contraindicated.  Avoid use.  Remote Monitoring: None.   RV support:  Digoxin.     2.  CAD:  Stable.  Continue Plavix, ASA, Metoprolol, Lisinopril, and Atorvastatin        3.  Mitral valve repair s/p mitral clip:  Follow up with Dr. Amaral as scheduled in April 2019.       4.  Lower extremity edema:  Elevate legs when resting.  Compression stockings. Diurese as outlined above.       5. HTN:  159/99 with repeat BP of 150/94.  Diurese as outlined above. Change to Entresto and titrate as tolerated.      6. Follow up:  CORE clinic in two weeks with BMP prior. Dr. Amaral as scheduled in April.       Vandana VENEGAS, BRANDON  [facility]

## 2018-11-06 NOTE — PATIENT INSTRUCTIONS
"You were seen today in the Cardiovascular Clinic at the [facility].     Cardiology Providers you saw during your visit: Vandana VENEGAS CNP      Follow up and medication changes:  1. Start taking Bumex 0.5 mg EVERY day.     2. Return to clinic in one week with labs prior    3. We are starting a new medication called Entresto. It might require a prior authorization from your insurance. When it is ready at your pharmacy, do not take it until you hear from us. You will need to STOP Lisinopril for 36 hours before starting Entresto.     4. Labs one week after starting Entresto.       Results for SYDNIE SIERRA (MRN [id number]) as of 2018 11:23   Ref. Range 2018 10:25   Sodium Latest Ref Range: 133 - 144 mmol/L 141   Potassium Latest Ref Range: 3.4 - 5.3 mmol/L 3.7   Chloride Latest Ref Range: 94 - 109 mmol/L 106   Carbon Dioxide Latest Ref Range: 20 - 32 mmol/L 26   Urea Nitrogen Latest Ref Range: 7 - 30 mg/dL 26   Creatinine Latest Ref Range: 0.66 - 1.25 mg/dL 1.19   GFR Estimate Latest Ref Range: >60 mL/min/1.7m2 64   GFR Estimate If Black Latest Ref Range: >60 mL/min/1.7m2 78   Calcium Latest Ref Range: 8.5 - 10.1 mg/dL 8.8   Anion Gap Latest Ref Range: 3 - 14 mmol/L 10   Glucose Latest Ref Range: 70 - 99 mg/dL 62 (L)       Please limit your fluid intake to 2 L (68 ounces) daily.  2 Liters a day = 8.5 cups, or 68 ounces.  Please limit your salt intake to 2 grams a day or less.     If you gain 2# in 24 hours or 5# in one week call Soraya Messina RN so we can adjust your medications as needed over the phone.     Please feel free to call me with any questions or concerns.       Soraya Messina RN CHFN  [facility]  Cardiology Care Coordinator-Heart Failure Clinic     Questions and [phone].   First press #1 for the Labtrip and then press #3 for \"Medical Questions\" to reach us Cardiology Nurses.      On Call Cardiologist for after hours or on weekends: " [phone]   option #4 and ask to speak to the on-call Cardiologist. Inform them you are a CORE/heart failure patient at the [place].           If you need a medication refill please contact your pharmacy.  Please allow 3 business days for your refill to be completed.  _______________________________________________________  C.O.R.E[facility] CLINIC Cardiomyopathy, Optimization, Rehabilitation, Education   The C.O.R.E. CLINIC is a heart failure specialty clinic within the [facility] where you will work with specialized nurse practitioners dedicated to helping patients with heart failure carefully adjust medications, receive education, and learn who and when to call if symptoms develop. They specialize in helping you better understand your condition, slow the progression of your disease, improve the length and quality of your life, help you detect future heart problems before they become life threatening, and avoid hospitalizations.  As always, thank you for trusting us with your health care needs!

## 2018-11-07 NOTE — TELEPHONE ENCOUNTER
Called pharmacy and they confirm medication approved with no co-pay. Called wife Van with  to review Entresto instructions. Per patient in clinic should call his wife to review once medication approved.  She called back and states she does not need .   Reviewed reasons for starting Entresto and side effects. Reviewed washout period with Lisinopril. Reviewed that he should not take any more Lisinopril. Last dose this morning. Can take first dose of Entresto tomorrow evening as long as it's been 36 hours. In clinic next week and we will recheck labs. She states understanding and will call with any questions.   Soraya Messina RN

## 2018-11-07 NOTE — TELEPHONE ENCOUNTER
Prior Authorization Approval    Authorization Effective Date: 11/7/2018  Authorization Expiration Date: 11/6/2021  Medication: Entresto 49/51 mg BID- APPROVED  Approved Dose/Quantity:   Reference #:     Insurance Company: [employer] [phone] Fax [phone]  Expected CoPay:       CoPay Card Available:      Foundation Assistance Needed:    Which Pharmacy is filling the prescription (Not needed for infusion/clinic administered): [id number] [address]  Pharmacy Notified: Yes  Patient Notified: Yes          Central Prior Authorization Team   Phone: [phone]      PA Initiation    Medication: Entresto 49/51 mg BID  Insurance Company: [employer] [phone] Fax [phone]  Pharmacy Filling the Rx: [id number] [address]  Filling Pharmacy Phone: [phone]  Filling Pharmacy Fax:    Start Date: 11/7/2018

## 2018-11-08 DIAGNOSIS — R57.0 CARDIOGENIC SHOCK (H): ICD-10-CM

## 2018-11-08 DIAGNOSIS — I25.10 CORONARY ARTERY DISEASE INVOLVING NATIVE CORONARY ARTERY OF NATIVE HEART WITHOUT ANGINA PECTORIS: ICD-10-CM

## 2018-11-08 RX ORDER — ATORVASTATIN CALCIUM 40 MG/1
40 TABLET, FILM COATED ORAL DAILY
Qty: 60 TABLET | Refills: 5 | Status: SHIPPED | OUTPATIENT
Start: 2018-11-08 | End: 2019-10-16

## 2018-11-08 NOTE — TELEPHONE ENCOUNTER
"Requested Prescriptions   Pending Prescriptions Disp Refills     atorvastatin (LIPITOR) 40 MG tablet [Pharmacy Med Name: ATORVASTATIN 40 MG TABLET] 60 tablet 0    Last Written Prescription Date:  18  Last Fill Quantity: 60,  # refills: 0   Last office visit: 10/1/2018 with prescribing provider:  10/1/18   Future Office Visit:     Si TABLET (40 MG) BY ORAL OR FEEDING TUBE ROUTE DAILY    Statins Protocol Passed    2018  2:10 AM       Passed - LDL on file in past 12 months    Recent Labs   Lab Test  10/22/18   0909   LDL  50            Passed - No abnormal creatine kinase in past 12 months    Recent Labs   Lab Test  18   0939   CKT  119               Passed - Recent (12 mo) or future (30 days) visit within the authorizing provider's specialty    Patient had office visit in the last 12 months or has a visit in the next 30 days with authorizing provider or within the authorizing provider's specialty.  See \"Patient Info\" tab in inbasket, or \"Choose Columns\" in Meds & Orders section of the refill encounter.             Passed - Patient is age 18 or older          "

## 2018-11-08 NOTE — TELEPHONE ENCOUNTER
Per recent labs ok to continue medication per MD .Raisa Fitzgerald RN  Prescription approved per [facility] Refill Protocol.

## 2018-11-11 DIAGNOSIS — Z91.09 ENVIRONMENTAL ALLERGIES: ICD-10-CM

## 2018-11-11 NOTE — TELEPHONE ENCOUNTER
"Requested Prescriptions   Pending Prescriptions Disp Refills     cetirizine (ZYRTEC) 10 MG tablet [Pharmacy Med Name: CETIRIZINE HCL 10 MG TABLET] 60 tablet 0    Last Written Prescription Date:  9/11/2018  Last Fill Quantity: 60,  # refills: 6   Last office visit: 10/1/2018 with prescribing provider:  10/1/2018   Future Office Visit:     Sig: TAKE 1 TABLET (10 MG) BY MOUTH DAILY    Antihistamines Protocol Passed    11/11/2018  2:48 PM       Passed - Patient is 3-64 years of age    Apply weight-based dosing for peds patients age 3 - 12 years of age.    Forward request to provider for patients under the age of 3 or over the age of 64.         Passed - Recent (12 mo) or future (30 days) visit within the authorizing provider's specialty    Patient had office visit in the last 12 months or has a visit in the next 30 days with authorizing provider or within the authorizing provider's specialty.  See \"Patient Info\" tab in inbasket, or \"Choose Columns\" in Meds & Orders section of the refill encounter.                "

## 2018-11-13 ENCOUNTER — OFFICE VISIT (OUTPATIENT)
Dept: CARDIOLOGY | Facility: CLINIC | Age: 53
End: 2018-11-13
Attending: NURSE PRACTITIONER
Payer: COMMERCIAL

## 2018-11-13 VITALS
HEART RATE: 77 BPM | WEIGHT: 126.5 LBS | SYSTOLIC BLOOD PRESSURE: 143 MMHG | BODY MASS INDEX: 23.28 KG/M2 | OXYGEN SATURATION: 99 % | DIASTOLIC BLOOD PRESSURE: 90 MMHG | RESPIRATION RATE: 16 BRPM | HEIGHT: 62 IN

## 2018-11-13 DIAGNOSIS — Z98.890 HISTORY OF MITRAL VALVE REPAIR: ICD-10-CM

## 2018-11-13 DIAGNOSIS — E87.6 HYPOKALEMIA: ICD-10-CM

## 2018-11-13 DIAGNOSIS — I10 BENIGN ESSENTIAL HYPERTENSION: ICD-10-CM

## 2018-11-13 DIAGNOSIS — I25.5 ISCHEMIC CARDIOMYOPATHY: ICD-10-CM

## 2018-11-13 DIAGNOSIS — I50.23 ACUTE ON CHRONIC SYSTOLIC CONGESTIVE HEART FAILURE (H): Primary | ICD-10-CM

## 2018-11-13 DIAGNOSIS — I25.10 CORONARY ARTERY DISEASE INVOLVING NATIVE CORONARY ARTERY OF NATIVE HEART WITHOUT ANGINA PECTORIS: ICD-10-CM

## 2018-11-13 LAB
ANION GAP SERPL CALCULATED.3IONS-SCNC: 7 MMOL/L (ref 3–14)
BUN SERPL-MCNC: 20 MG/DL (ref 7–30)
CALCIUM SERPL-MCNC: 8.7 MG/DL (ref 8.5–10.1)
CHLORIDE SERPL-SCNC: 102 MMOL/L (ref 94–109)
CO2 SERPL-SCNC: 28 MMOL/L (ref 20–32)
CREAT SERPL-MCNC: 1.14 MG/DL (ref 0.66–1.25)
GFR SERPL CREATININE-BSD FRML MDRD: 68 ML/MIN/1.7M2
GLUCOSE SERPL-MCNC: 197 MG/DL (ref 70–99)
POTASSIUM SERPL-SCNC: 3.6 MMOL/L (ref 3.4–5.3)
SODIUM SERPL-SCNC: 137 MMOL/L (ref 133–144)

## 2018-11-13 PROCEDURE — T1013 SIGN LANG/ORAL INTERPRETER: HCPCS | Mod: U3,ZF

## 2018-11-13 PROCEDURE — 80048 BASIC METABOLIC PNL TOTAL CA: CPT | Performed by: NURSE PRACTITIONER

## 2018-11-13 PROCEDURE — G0463 HOSPITAL OUTPT CLINIC VISIT: HCPCS | Mod: ZF

## 2018-11-13 PROCEDURE — 99214 OFFICE O/P EST MOD 30 MIN: CPT | Mod: ZP | Performed by: NURSE PRACTITIONER

## 2018-11-13 PROCEDURE — 36415 COLL VENOUS BLD VENIPUNCTURE: CPT | Performed by: NURSE PRACTITIONER

## 2018-11-13 RX ORDER — CETIRIZINE HYDROCHLORIDE 10 MG/1
TABLET ORAL
Qty: 60 TABLET | Refills: 10 | Status: SHIPPED | OUTPATIENT
Start: 2018-11-13 | End: 2019-12-11

## 2018-11-13 RX ORDER — BUMETANIDE 0.5 MG/1
1 TABLET ORAL DAILY
Qty: 45 TABLET | Refills: 3 | Status: SHIPPED | OUTPATIENT
Start: 2018-11-13 | End: 2018-11-20

## 2018-11-13 RX ORDER — POTASSIUM CHLORIDE 1500 MG/1
20 TABLET, EXTENDED RELEASE ORAL DAILY
Qty: 60 TABLET | Refills: 3 | Status: SHIPPED | OUTPATIENT
Start: 2018-11-13 | End: 2018-11-20

## 2018-11-13 ASSESSMENT — PAIN SCALES - GENERAL: PAINLEVEL: NO PAIN (0)

## 2018-11-13 NOTE — MR AVS SNAPSHOT
After Visit Summary   2018    Luke Henao    MRN: [id number]           Patient Information     Date Of Birth          1965        Visit Information        Provider Department      2018 8:45 AM Osorio Cabrera; Vandana Zambrano, NP [facility]        Today's Diagnoses     Hypokalemia    -  1    Ischemic cardiomyopathy          Care Instructions    You were seen today in the Cardiovascular Clinic at the [facility].     Cardiology Providers you saw during your visit: Vandana VENEGAS, BRANDON      Follow up and medication changes:    1. Increase Bumex to 1 mg daily    2. Start taking Potassium 20 mEq daily (1 tab)    3. Repeat Basic Metabolic Lab in 1 week    4. Return to [facility] Clinic in one month.       Results for LUKE HENAO (MRN [id number]) as of 2018 09:24   Ref. Range 2018 08:35   Sodium Latest Ref Range: 133 - 144 mmol/L 137   Potassium Latest Ref Range: 3.4 - 5.3 mmol/L 3.6   Chloride Latest Ref Range: 94 - 109 mmol/L 102   Carbon Dioxide Latest Ref Range: 20 - 32 mmol/L 28   Urea Nitrogen Latest Ref Range: 7 - 30 mg/dL 20   Creatinine Latest Ref Range: 0.66 - 1.25 mg/dL 1.14   GFR Estimate Latest Ref Range: >60 mL/min/1.7m2 68   GFR Estimate If Black Latest Ref Range: >60 mL/min/1.7m2 82   Calcium Latest Ref Range: 8.5 - 10.1 mg/dL 8.7   Anion Gap Latest Ref Range: 3 - 14 mmol/L 7   Glucose Latest Ref Range: 70 - 99 mg/dL 197 (H)         Please limit your fluid intake to 2 L (68 ounces) daily.  2 Liters a day = 8.5 cups, or 68 ounces.  Please limit your salt intake to 2 grams a day or less.     If you gain 2# in 24 hours or 5# in one week call Soraya Messina RN so we can adjust your medications as needed over the phone.     Please feel free to call me with any questions or concerns.       Soraya Messina RN CHFN  [facility]  Cardiology Care Coordinator-Heart Failure Clinic     Questions and [phone].   First  "press #1 for the University and then press #3 for \"Medical Questions\" to reach us Cardiology Nurses.      On Call Cardiologist for after hours or on weekends: [phone]   option #4 and ask to speak to the on-call Cardiologist. Inform them you are a CORE/heart failure patient at the [facility].           If you need a medication refill please contact your pharmacy.  Please allow 3 business days for your refill to be completed.  _______________________________________________________  C.O.R.E[facility] CLINIC Cardiomyopathy, Optimization, Rehabilitation, Education   The C.O.R.E. CLINIC is a heart failure specialty clinic within the [facility] where you will work with specialized nurse practitioners dedicated to helping patients with heart failure carefully adjust medications, receive education, and learn who and when to call if symptoms develop. They specialize in helping you better understand your condition, slow the progression of your disease, improve the length and quality of your life, help you detect future heart problems before they become life threatening, and avoid hospitalizations.  As always, thank you for trusting us with your health care needs!             Follow-ups after your visit        Additional Services     Follow-Up with [facility] Clinic                 Your next 10 appointments already scheduled     Nov 20, 2018  9:30 AM CST   LAB with [facility]   [facility] ([facility])    [address] [place] [id number] [phone]           Please do not eat 10-12 hours before your appointment if you are coming in fasting for labs on lipids, cholesterol, or glucose (sugar). This does not apply to pregnant women. Water, hot tea and black coffee (with nothing added) are okay. Do not drink other fluids, diet soda or chew gum.            Dec 04, 2018  9:30 AM CST   Lab with  LAB    [facility]    [address] [place] [address]  [facility] [address]   [phone]            Dec 04, 2018 10:00 AM CST   (Arrive by 9:45 AM)   CORE RETURN with Vandana Zambrano NP   [facility] ([facility])    [address] [place] [address]  [address] [place] [address]   [phone]            Apr 02, 2019 10:45 AM CDT   Office Visit with Shanna Church MD   [facility] ([facility])    [address] [id number] [phone]           Bring a current list of meds and any records pertaining to this visit. For Physicals, please bring immunization records and any forms needing to be filled out. Please arrive 10 minutes early to complete paperwork.            Apr 03, 2019  9:30 AM CDT   Ech Complete with [email or web address]   [facility])    [address] [place] [id number] [phone]           1.  Please bring or wear a comfortable two-piece outfit. 2.  You may eat, drink and take your normal medicines. 3.  For any questions that cannot be answered, please contact the ordering physician 4.  Please do not wear perfumes or scented lotions on the day of your exam.            Apr 03, 2019 10:00 AM CDT   (Arrive by 9:45 AM)   Return Visit with Amanda Amaral MD   [facility] ([facility])    [address] [place] [address]  [address] [place] [id number] [phone]            Apr 03, 2019 10:30 AM CDT   (Arrive by 10:15 AM)   Implanted Defibulator with Uc Cv Device 1   [facility] ([facility])    [address] [place] [address]  [id number]                 Future tests that were ordered for you today     Open Future Orders        Priority Expected Expires Ordered    Basic metabolic panel Routine 11/20/2018 11/27/2018 11/13/2018    Follow-Up with [facility] Clinic Routine 12/3/2018  "1/31/2019 11/13/2018            Who to contact     If you have questions or need follow up information about today's clinic visit or your schedule please contact [facility] directly at [phone].  Normal or non-critical lab and imaging results will be communicated to you by MyChart, letter or phone within 4 business days after the clinic has received the results. If you do not hear from us within 7 days, please contact the clinic through MyChart or phone. If you have a critical or abnormal lab result, we will notify you by phone as soon as possible.  Submit refill requests through [employer] or call your pharmacy and they will forward the refill request to us. Please allow 3 business days for your refill to be completed.          Additional Information About Your Visit        Care EveryWhere ID     This is your Care EveryWhere ID. This could be used by other organizations to access your [facility] medical records  [id number]        Your Vitals Were     Pulse Respirations Height Pulse Oximetry BMI (Body Mass Index)       77 16 1.575 m (5' 2\") 99% 23.14 kg/m2        Blood Pressure from Last 3 Encounters:   11/13/18 143/90   11/06/18 (!) 150/94   10/15/18 (!) 147/95    Weight from Last 3 Encounters:   11/13/18 57.4 kg (126 lb 8 oz)   11/06/18 59.4 kg (131 lb)   10/15/18 58.3 kg (128 lb 8.5 oz)              We Performed the Following     Follow-Up with [facility]          Today's Medication Changes          These changes are accurate as of 11/13/18 10:12 AM.  If you have any questions, ask your nurse or doctor.               Start taking these medicines.        Dose/Directions    potassium chloride SA 20 MEQ CR tablet   Commonly known as:  K-DUR/KLOR-CON M   Used for:  Ischemic cardiomyopathy, Hypokalemia   Started by:  Vandana Zambrano, NP        Dose:  20 mEq   Take 1 tablet (20 mEq) by mouth daily   Quantity:  60 tablet   Refills:  3         These medicines have changed or have updated prescriptions.     "    Dose/Directions    bumetanide 0.5 MG tablet   Commonly known as:  BUMEX   This may have changed:  how much to take   Used for:  Ischemic cardiomyopathy   Changed by:  Vandana Zambrano, NP        Dose:  1 mg   Take 2 tablets (1 mg) by mouth daily   Quantity:  45 tablet   Refills:  3            Where to get your medicines      These medications were sent to [facility]/pharmacy #[id number] [facility]  [address] [place] [address]     Phone:  [phone]     bumetanide 0.5 MG tablet    potassium chloride SA 20 MEQ CR tablet                Primary Care Provider Office Phone # Fax #    Shanna Church [phone]       [address] [facility] [address]        Equal Access to Services     CUBA AYALA : Hadii aad ku hadasho Soomaali, waaxda luqadaha, qaybta kaalmada adeegyada, karen rosen. So [facility] [phone].    ATENCIÓN: Si habla español, tiene a suarez disposición servicios gratuitos de asistencia lingüística. LlSelect [facility] [phone].    We comply with applicable federal civil rights laws and [place] laws. We do not discriminate on the basis of race, color, national origin, age, disability, sex, sexual orientation, or gender identity.            Thank you!     Thank you for choosing [facility]  for your care. Our goal is always to provide you with excellent care. Hearing back from our patients is one way we can continue to improve our services. Please take a few minutes to complete the written survey that you may receive in the mail after your visit with us. Thank you!             Your Updated Medication List - Protect others around you: Learn how to safely use, store and throw away your medicines at www.disposemymeds.org.          This list is accurate as of 11/13/18 10:12 AM.  Always use your most recent med list.                   Brand Name Dispense Instructions for use Diagnosis    aspirin 81 MG chewable tablet     90 tablet     Take 1 tablet (81 mg) by mouth daily    Coronary artery disease involving native coronary artery of native heart without angina pectoris       atorvastatin 40 MG tablet    LIPITOR    60 tablet    Take 1 tablet (40 mg) by mouth daily    Coronary artery disease involving native coronary artery of native heart without angina pectoris, Cardiogenic shock (H)       bumetanide 0.5 MG tablet    BUMEX    45 tablet    Take 2 tablets (1 mg) by mouth daily    Ischemic cardiomyopathy       cetirizine 10 MG tablet    zyrTEC    60 tablet    TAKE 1 TABLET (10 MG) BY MOUTH DAILY    Environmental allergies       digoxin 125 MCG tablet    LANOXIN    60 tablet    Take one tablet on M, W, F, Sunday.    Coronary artery disease involving native coronary artery of native heart without angina pectoris       fluticasone 50 MCG/ACT spray    FLONASE    3 Bottle    Spray 2 sprays into both nostrils daily    Nasal congestion       glipiZIDE 5 MG tablet    GLUCOTROL    180 tablet    Take 1 tablet (5 mg) by mouth 2 times daily (before meals)    Type 2 diabetes mellitus without complication, without long-term current use of insulin (H)       insulin pen needle 32G X 4 MM    BD KYLEE U/F    100 each    Use 3 daily or as directed.    Diabetes mellitus type 2, insulin dependent (H)       metFORMIN 500 MG 24 hr tablet    GLUCOPHAGE-XR    180 tablet    Take 1,000 mg by mouth daily (with dinner)    Type 2 diabetes mellitus without complication, without long-term current use of insulin (H)       metoprolol succinate 50 MG 24 hr tablet    TOPROL-XL    180 tablet    Take 3 tablets (150 mg) by mouth daily    Chronic systolic congestive heart failure (H)       ONETOUCH ULTRA test strip   Generic drug:  blood glucose monitoring     100 strip    USE TO TEST BLOOD SUGAR 3 TIMES DAILY OR AS DIRECTED.    Diabetes mellitus type 2, insulin dependent (H)       potassium chloride SA 20 MEQ CR tablet    K-DUR/KLOR-CON M    60 tablet    Take 1 tablet (20 mEq) by mouth  daily    Ischemic cardiomyopathy, Hypokalemia       sacubitril-valsartan 49-51 MG per tablet    ENTRESTO    60 tablet    Take 1 tablet by mouth 2 times daily    Ischemic cardiomyopathy

## 2018-11-13 NOTE — PROGRESS NOTES
HPI: Luke is a 53 year old gentleman with a past medical history of ICM, CAD with a RCA STEMI s/p PCI x 7 to RCA, left circumflex (now ), and LAD on 3/27/17 complicated by PEDRO, sepsis, sacral ulcer, DVT, bilateral hemispheric infarcts secondary to watershed ischemia, and cardiogenic shock s/p IABP, who was later transitioned to a subclavian balloon pump and underwent mitral clip for ischemic MR.  He returns to clinic to reassess his volume status after increasing his Bumex to 0.5 mg daily and starting him on Entresto 49/51 mg twice daily a week ago.     Since starting Entresto and increasing his diuretic, Luke is feeling great. His breathing has improved, he no longer feels congested, and he feels stronger. He doesn't feel weak in his legs like he did previously.  He is able to climb a flight of stairs without hanging on to the railing or getting shortness of breath.  He works a 10 hour shift without taking a nap.  His BP at home has been controlled between 115/79 to 120/80 at home.  Weight has been stable between 127-128 lbs.     PAST MEDICAL HISTORY:  Past Medical History:   Diagnosis Date     Acne      CAD (coronary artery disease) 2017    RCA STEMI s/p balloon angioplasty and multiple Sofie to RCA, LCx, and LAD     Cardiogenic shock (H)      DVT (deep venous thrombosis) (H) 2017    left internal jugular (line-associated); left common femoral; on coumadin     Ischemic cardiomyopathy 2017    EF 30-35%     Ischemic stroke (H) 2017    no residual deficits     Lower GI bleed 2017    due to rectal ulcer     Mitral valve insufficiency, ischemic 2017    s/p Mitral Clip placement      Skin ulcer of sacrum (H)      Systolic heart failure (H)      Type 2 diabetes mellitus (H)        FAMILY HISTORY:  Family History   Problem Relation Age of Onset     Hypertension Mother      Type 2 Diabetes Father      Hypertension Father      Myocardial Infarction No family hx of      Cerebrovascular Disease No family hx of       Coronary Artery Disease Early Onset No family hx of      Colon Cancer No family hx of      Prostate Cancer No family hx of        SOCIAL HISTORY:  Social History     Social History     Marital status:      Spouse name: N/A     Number of children: N/A     Years of education: N/A     Occupational History     H[employer]      Social History Main Topics     Smoking status: Former Smoker     Packs/day: 0.50     Years: 30.00     Types: Cigarettes     Quit date: 1/1/2017     Smokeless tobacco: Never Used     Alcohol use No     Drug use: No     Sexual activity: Not Currently     Other Topics Concern     None     Social History Narrative    . Remarried.    4 children with first marriage.    2 grand children.    Walks daily, but no formal exercise.            CURRENT MEDICATIONS:  Current Outpatient Prescriptions   Medication Sig Dispense Refill     aspirin 81 MG chewable tablet Take 1 tablet (81 mg) by mouth daily 90 tablet 3     atorvastatin (LIPITOR) 40 MG tablet Take 1 tablet (40 mg) by mouth daily 60 tablet 5     bumetanide (BUMEX) 0.5 MG tablet Take 1 tablet (0.5 mg) by mouth daily 45 tablet 3     cetirizine (ZYRTEC) 10 MG tablet TAKE 1 TABLET (10 MG) BY MOUTH DAILY 60 tablet 10     digoxin (LANOXIN) 125 MCG tablet Take one tablet on M, W, F, Sunday. 60 tablet 5     fluticasone (FLONASE) 50 MCG/ACT spray Spray 2 sprays into both nostrils daily (Patient not taking: Reported on 11/6/2018) 3 Bottle 3     glipiZIDE (GLUCOTROL) 5 MG tablet Take 1 tablet (5 mg) by mouth 2 times daily (before meals) 180 tablet 3     insulin pen needle (BD KYLEE U/F) 32G X 4 MM Use 3 daily or as directed. (Patient not taking: Reported on 11/6/2018) 100 each prn     metFORMIN (GLUCOPHAGE-XR) 500 MG 24 hr tablet Take 1 tablet (500 mg) by mouth daily (with dinner) 180 tablet 3     metoprolol succinate (TOPROL-XL) 50 MG 24 hr tablet Take 3 tablets (150 mg) by mouth daily 180 tablet 3     ONETOUCH ULTRA test strip USE TO TEST  "BLOOD SUGAR 3 TIMES DAILY OR AS DIRECTED. (Patient not taking: Reported on 10/15/2018) 100 strip 3     sacubitril-valsartan (ENTRESTO) 49-51 MG per tablet Take 1 tablet by mouth 2 times daily 60 tablet 1       ROS:  Constitutional: Denies fever, chills, or diaphoresis.     ENT: Denies visual disturbance, hearing loss, epistaxis, vertigo,   Respiratory:  See HPI  Cardiovascular: As per HPI.   GI: Denies nausea, vomiting, diarrhea, hematemesis, melena, or hematochezia.   : Denies urinary frequency, dysuria, or hematuria.   Integument: Negative for bruising, rash, or pruritis.  Psychiatric: Negative for anxiety, depression, sleep disturbance, or mood changes.   Neuro: Negative for headaches, syncope, numbness or tingling.   Endocrinology: Negative for thyroid disorder, night sweats, diabetes.   Musculoskeletal: Negative for muscle weakness or gout.     EXAM:  /90 (BP Location: Right arm, Patient Position: Sitting, Cuff Size: Adult Regular)  Pulse 77  Resp 16  Ht 1.575 m (5' 2\")  Wt 57.4 kg (126 lb 8 oz)  SpO2 99%  BMI 23.14 kg/m2  General: alert, articulate, and in no acute distress.  HEENT: normocephalic, atraumatic, anicteric sclera, EOMI, mucosa moist, no cyanosis.   Neck: neck supple. JVP 12 cm.   Heart: regular rhythm, normal S1/S2, no murmur, gallop, rub.  Precordium quiet with normal PMI.     Lungs: clear, no rales, ronchi, or wheezing.  No accessory muscle use, respirations unlabored.   Abdomen: Less distended.  Soft, non-tender, bowel sounds present, no hepatomegaly  Extremities: Trace pitting edema.   No cyanosis.  Extremities warm.  2+ pedal pulses.   Neurological: Alert and oriented x 3.  Normal speech and affect, no gross motor deficits  Skin:  No ecchymoses or rashes.     Labs:  CBC RESULTS:  Lab Results   Component Value Date    WBC 10.7 10/01/2018    RBC 4.97 10/01/2018    HGB 14.9 10/01/2018    HCT 46.3 10/01/2018    MCV 93 10/01/2018    MCH 30.0 10/01/2018    MCHC 32.2 10/01/2018    RDW " 14.6 10/01/2018     10/01/2018       CMP RESULTS:  Lab Results   Component Value Date     2018    POTASSIUM 3.7 2018    CHLORIDE 106 2018    CO2 26 2018    ANIONGAP 10 2018    GLC 62 (L) 2018    BUN 26 2018    CR 1.19 2018    GFRESTIMATED 64 2018    GFRESTBLACK 78 2018    ROB 8.8 2018    BILITOTAL 1.4 (H) 10/01/2018    ALBUMIN 4.2 10/01/2018    ALKPHOS 67 10/01/2018    ALT 34 10/01/2018    AST 21 10/01/2018        INR RESULTS:  Lab Results   Component Value Date    INR 2.3 (A) 2017    INR 2.36 (H) 2017       No components found for: CK  Lab Results   Component Value Date    MAG 2.4 (H) 2017     Lab Results   Component Value Date    NTBNP 1599 (H) 2017     @BRIEFLABR (dig)@    Most recent echocardiogram:  Recent Results (from the past 8760 hour(s))   Echocardiogram Complete    Narrative    [id number]  [id number]  [id number]  [id number]^SHANTELL^SYDNEE^           [facility]  [facility]-3rd Floor  [address] [place] [id number]     Name: SYDNIE SIERRA  MRN: [id number]  : 1965  Study Date: 2018 07:55 AM  Age: 52 yrs  Gender: Male  [place] [facility] [place]  Reason For Study: 1 year s/p MitraClip  Ordering Physician: SYDNEE STINSON  Referring Physician: SYDNEE STINSON  Performed By: RAE Agarwal     BSA: 1.6 m2  Height: 62 in  Weight: 126 lb  BP: 130/91 mmHg  _____________________________________________________________________________  __        Procedure  Echocardiogram with two-dimensional, color and spectral Doppler performed.  _____________________________________________________________________________  __        Interpretation Summary  The Ejection Fraction is estimated at 30-35%. Left ventricular size is normal.  Global right ventricular function is mildly reduced. The right ventricle is  normal size.  s/p MitralClip with MV  mPG of 4mmHg at 90 bpm. Trace mitral insufficiency is  present.  Moderate tricuspid insufficiency is present.  This study was compared with the study from 12/15/17 .  There has been no change.  _____________________________________________________________________________  __        Left Ventricle  Left ventricular size is normal. Left ventricular wall thickness is normal.  The Ejection Fraction is estimated at 30-35%. Grade III or advanced diastolic  dysfunction. Akinesis of the basal to apical inferior, basal infero-lateral  and infero-septal segments.     Right Ventricle  The right ventricle is normal size. Global right ventricular function is  mildly reduced. A pacemaker lead is noted in the right ventricle.     Atria  Both atria appear normal. The atrial septum is intact as assessed by color  Doppler . A pacemaker lead is noted in the right atrium.     Mitral Valve  Trace mitral insufficiency is present. s/p MitralClip with MV mPG of 4mmHg at  90 bpm.        Aortic Valve  Aortic valve is normal in structure and function. The aortic valve is  tricuspid.     Tricuspid Valve  Moderate tricuspid insufficiency is present. The right ventricular systolic  pressure is approximated at 41.7 mmHg plus the right atrial pressure. Etiology  of TR is leaflet restriction by ICD lead.     Pulmonic Valve  The pulmonic valve is normal. Trace pulmonic insufficiency is present.     Vessels  The aorta root is normal. The pulmonary artery is normal. The inferior vena  cava was normal in size with preserved respiratory variability.     Pericardium  No pericardial effusion is present.        Compared to Previous Study  This study was compared with the study from 12/15/17 . There has been no  change.     Attestation  I have personally viewed the imaging and agree with the interpretation and  report as documented by the fellow, Kirby Rodriguez, and/or edited by  me.  _____________________________________________________________________________  __     MMode/2D Measurements & Calculations  IVSd: 0.84 cm  LVIDd: 5.0 cm  LVIDs: 4.5 cm  LVPWd: 0.89 cm  FS: 9.6 %  LV mass(C)d: 149.1 grams  LV mass(C)dI: 94.9 grams/m2  Ao root diam: 2.6 cm  asc Aorta Diam: 3.0 cm  LVOT diam: 2.0 cm  LVOT area: 3.1 cm2     EF(MOD-bp): 26.7 %  LA Volume (BP): 42.8 ml     LA Volume Index (BP): 27.3 ml/m2  RWT: 0.36        Doppler Measurements & Calculations  MV E max frank: 129.0 cm/sec  MV A max frank: 62.4 cm/sec  MV E/A: 2.1  MV max PG: 10.9 mmHg  MV mean PG: 3.6 mmHg  MV V2 VTI: 26.1 cm  MVA(VTI): 1.4 cm2  MV dec time: 0.10 sec  Ao V2 max: 97.9 cm/sec  Ao max P.0 mmHg  Ao V2 mean: 71.0 cm/sec  Ao mean P.3 mmHg  Ao V2 VTI: 18.3 cm  MICHELLE(I,D): 2.0 cm2  MICHELLE(V,D): 2.2 cm2  LV V1 max P.9 mmHg  LV V1 max: 68.9 cm/sec  LV V1 VTI: 12.0 cm  SV(LVOT): 37.2 ml  SI(LVOT): 23.7 ml/m2  PA V2 max: 65.2 cm/sec  PA max P.7 mmHg  PA acc time: 0.13 sec  PI end-d frank: 160.2 cm/sec  TR max frank: 322.8 cm/sec  TR max P.7 mmHg  AV Frank Ratio (DI): 0.70     MICHELLE Index (cm2/m2): 1.3  E/E' av.1  Lateral E/e': 32.3  Medial E/e': 35.8     _____________________________________________________________________________  __           Report approved by: Isabel Love 2018 09:49 AM      Echocardiogram Limited    Narrative    [id number]  [facility]  [id number]  [id number]^MEGHAN^IVA^AMBER           [facility]  [facility]-3rd Floor  [address] [place] [id number]     Name: SYDNIE SIERRA  MRN: [id number]  : 1965  Study Date: 12/15/2017 10:52 AM  Age: 52 yrs  Gender: Male  Patient Location: [facility] [place]  Reason For Study: s/p Karena Clip  Ordering Physician: IVA CHRISTIANSON  Referring Physician: IVA CHRISTIANSON  Performed By: Sandra Fields RDCS     BSA: 1.6 m2  Height: 62 in  Weight: 123 lb  BP: 100/74  mmHg  _____________________________________________________________________________  __        Procedure  Limited Echocardiogram with portions of two-dimensional, color and spectral  Doppler performed.  _____________________________________________________________________________  __        Interpretation Summary  Moderately reduced systolic function. EF 30-35%. Global hypokinesis with  akinesis of the basal-mid inferior and basal-mid inferolateral walls.  Global right ventricular function is mildly reduced.  Status post transcatheter mitral valve repair with Mitraclip. The clip is  attached to the leaflets. There is trace to mild mitral regurgitation. Mean  gradient 5 mmHg at heart rate 97 bpm.  Pulmonary hypertension with right ventricular systolic pressure 41mmHg above  the right atrial pressure present.  No pericardial effusion.     Compared to the prior study 8/10/17 there has been no significant change.  _____________________________________________________________________________  __        Left Ventricle  Left ventricular wall thickness is normal. Mild left ventricular dilation is  present. Global hypokinesis with akinesis of the basal-mid inferior and basal-  mid inferolateral walls. Moderately reduced systolic function. EF 30-35%.  Diastolic function not assessed.     Right Ventricle  The right ventricle is normal size. Global right ventricular function is  mildly reduced. A pacemaker lead is noted in the right ventricle.     Atria  Both atria appear normal.     Mitral Valve  Status post transcatheter mitral valve repair with Mitraclip. The clip is  attached to the leaflets. There is trace to mild mitral regurgitation. Mean  gradient 5 mmHg at heart rate 97 bpm.        Aortic Valve  Aortic valve is normal in structure and function.     Tricuspid Valve  Mild tricuspid insufficiency is present. Right ventricular systolic pressure  is 41mmHg above the right atrial pressure.     Pulmonic Valve  The pulmonic  valve is normal.     Vessels  The aorta root is normal. The inferior vena cava was normal in size with  preserved respiratory variability. Estimated mean right atrial pressure is 3  mmHg.     Pericardium  No pericardial effusion is present.        Attestation  I have personally viewed the imaging and agree with the interpretation and  report as documented by the fellow, David Hill, and/or edited by me.  _____________________________________________________________________________  __  MMode/2D Measurements & Calculations     IVSd: 0.72 cm  LVIDd: 4.9 cm  LVIDs: 4.2 cm  LVPWd: 0.73 cm  FS: 14.2 %  EDV(Teich): 115.5 ml  ESV(Teich): 80.6 ml  LV mass(C)d: 118.2 grams  LV mass(C)dI: 76.1 grams/m2     EF(MOD-bp): 34.1 %  LA Volume (BP): 49.5 ml  LA Volume Index (BP): 31.9 ml/m2  RWT: 0.30           Doppler Measurements & Calculations  MV E max isabel: 126.4 cm/sec  MV A max isabel: 46.5 cm/sec  MV E/A: 2.7  MV max P.7 mmHg  MV mean P.6 mmHg  MV V2 VTI: 26.5 cm  MV dec time: 0.13 sec  TR max isabel: 321.2 cm/sec  TR max P.3 mmHg     _____________________________________________________________________________  __           Report approved by: Isabel Alex 12/15/2017 12:24 PM            Assessment and Plan:    Luke is a 53 year old gentleman with a past medical history of ICM who appears hypervolemic today.  I have increased his Bumex to 1 mg daily and started potassium 20 meq daily.  He will repeat a BMP in one week.  If his potassium and creatinine appear stable, the plan would be to increase his Entresto further.  He will return to [facility] in one month.     1.  Chronic systolic heart failure secondary to ischemic cardiomyopathy.  Stage D NYHA Class II  ACEi/ARB: Entresto 49/51 mg twice daily.  BB: yes, Toprol  mg daily.   Aldosterone antagonist: Deferred due to worsening renal function with previous attempts.   SCD prophylaxis: ICD  Fluid status: Hypervolemic.   Anticoagulation: None.    Antiplatelet:  ASA dose   Sleep apnea:  Not documented.  NSAID use:  Contraindicated.  Avoid use.  Remote Monitoring: None.   RV support:  Continue Digoxin.     2.  CAD:  Stable.  Continue Plavix, ASA, Metoprolol, Lisinopril, and Atorvastatin        3.  Mitral valve repair s/p mitral clip:  Follow up with Dr. Amaral as scheduled in April 2019.       4. HTN: Elevated at 143/90 today, but controlled at home. Continue to monitor.  Continue titrating Entresto, which will allow for better BP control.       6. Follow up:  BMP in one week. [facility] in one month.  Dr. Amaral as scheduled in April.       Vandana VENEGAS, CNP  [facility]

## 2018-11-13 NOTE — LETTER
11/13/2018      RE: Luke Henao  [address]  [facility] [address]       Dear Colleague,    Thank you for the opportunity to participate in the care of your patient, Luke Henao, at the [facility] at [facility]. Please see a copy of my visit note below.      HPI: Luke is a 53 year old gentleman with a past medical history of ICM, CAD with a RCA STEMI s/p PCI x 7 to RCA, left circumflex (now ), and LAD on 3/27/17 complicated by PEDRO, sepsis, sacral ulcer, DVT, bilateral hemispheric infarcts secondary to watershed ischemia, and cardiogenic shock s/p IABP, who was later transitioned to a subclavian balloon pump and underwent mitral clip for ischemic MR.  He returns to clinic to reassess his volume status after increasing his Bumex to 0.5 mg daily and starting him on Entresto 49/51 mg twice daily a week ago.     Since starting Entresto and increasing his diuretic, Luke is feeling great. His breathing has improved, he no longer feels congested, and he feels stronger. He doesn't feel weak in his legs like he did previously.  He is able to climb a flight of stairs without hanging on to the railing or getting shortness of breath.  He works a 10 hour shift without taking a nap.  His BP at home has been controlled between 115/79 to 120/80 at home.  Weight has been stable between 127-128 lbs.     PAST MEDICAL HISTORY:  Past Medical History:   Diagnosis Date     Acne      CAD (coronary artery disease) 2017    RCA STEMI s/p balloon angioplasty and multiple Sofie to RCA, LCx, and LAD     Cardiogenic shock (H)      DVT (deep venous thrombosis) (H) 2017    left internal jugular (line-associated); left common femoral; on coumadin     Ischemic cardiomyopathy 2017    EF 30-35%     Ischemic stroke (H) 2017    no residual deficits     Lower GI bleed 2017    due to rectal ulcer     Mitral valve insufficiency, ischemic 2017    s/p Mitral Clip placement      Skin ulcer of sacrum (H)       Systolic heart failure (H)      Type 2 diabetes mellitus (H)        FAMILY HISTORY:  Family History   Problem Relation Age of Onset     Hypertension Mother      Type 2 Diabetes Father      Hypertension Father      Myocardial Infarction No family hx of      Cerebrovascular Disease No family hx of      Coronary Artery Disease Early Onset No family hx of      Colon Cancer No family hx of      Prostate Cancer No family hx of        SOCIAL HISTORY:  Social History     Social History     Marital status:      Spouse name: N/A     Number of children: N/A     Years of education: N/A     Occupational History     H[employer]      Social History Main Topics     Smoking status: Former Smoker     Packs/day: 0.50     Years: 30.00     Types: Cigarettes     Quit date: 1/1/2017     Smokeless tobacco: Never Used     Alcohol use No     Drug use: No     Sexual activity: Not Currently     Other Topics Concern     None     Social History Narrative    . Remarried.    4 children with first marriage.    2 grand children.    Walks daily, but no formal exercise.            CURRENT MEDICATIONS:  Current Outpatient Prescriptions   Medication Sig Dispense Refill     aspirin 81 MG chewable tablet Take 1 tablet (81 mg) by mouth daily 90 tablet 3     atorvastatin (LIPITOR) 40 MG tablet Take 1 tablet (40 mg) by mouth daily 60 tablet 5     bumetanide (BUMEX) 0.5 MG tablet Take 1 tablet (0.5 mg) by mouth daily 45 tablet 3     cetirizine (ZYRTEC) 10 MG tablet TAKE 1 TABLET (10 MG) BY MOUTH DAILY 60 tablet 10     digoxin (LANOXIN) 125 MCG tablet Take one tablet on M, W, F, Sunday. 60 tablet 5     fluticasone (FLONASE) 50 MCG/ACT spray Spray 2 sprays into both nostrils daily (Patient not taking: Reported on 11/6/2018) 3 Bottle 3     glipiZIDE (GLUCOTROL) 5 MG tablet Take 1 tablet (5 mg) by mouth 2 times daily (before meals) 180 tablet 3     insulin pen needle (BD KYLEE U/F) 32G X 4 MM Use 3 daily or as directed. (Patient not  "taking: Reported on 11/6/2018) 100 each prn     metFORMIN (GLUCOPHAGE-XR) 500 MG 24 hr tablet Take 1 tablet (500 mg) by mouth daily (with dinner) 180 tablet 3     metoprolol succinate (TOPROL-XL) 50 MG 24 hr tablet Take 3 tablets (150 mg) by mouth daily 180 tablet 3     ONETOUCH ULTRA test strip USE TO TEST BLOOD SUGAR 3 TIMES DAILY OR AS DIRECTED. (Patient not taking: Reported on 10/15/2018) 100 strip 3     sacubitril-valsartan (ENTRESTO) 49-51 MG per tablet Take 1 tablet by mouth 2 times daily 60 tablet 1       ROS:  Constitutional: Denies fever, chills, or diaphoresis.     ENT: Denies visual disturbance, hearing loss, epistaxis, vertigo,   Respiratory:  See HPI  Cardiovascular: As per HPI.   GI: Denies nausea, vomiting, diarrhea, hematemesis, melena, or hematochezia.   : Denies urinary frequency, dysuria, or hematuria.   Integument: Negative for bruising, rash, or pruritis.  Psychiatric: Negative for anxiety, depression, sleep disturbance, or mood changes.   Neuro: Negative for headaches, syncope, numbness or tingling.   Endocrinology: Negative for thyroid disorder, night sweats, diabetes.   Musculoskeletal: Negative for muscle weakness or gout.     EXAM:  /90 (BP Location: Right arm, Patient Position: Sitting, Cuff Size: Adult Regular)  Pulse 77  Resp 16  Ht 1.575 m (5' 2\")  Wt 57.4 kg (126 lb 8 oz)  SpO2 99%  BMI 23.14 kg/m2  General: alert, articulate, and in no acute distress.  HEENT: normocephalic, atraumatic, anicteric sclera, EOMI, mucosa moist, no cyanosis.   Neck: neck supple. JVP 12 cm.   Heart: regular rhythm, normal S1/S2, no murmur, gallop, rub.  Precordium quiet with normal PMI.     Lungs: clear, no rales, ronchi, or wheezing.  No accessory muscle use, respirations unlabored.   Abdomen: Less distended.  Soft, non-tender, bowel sounds present, no hepatomegaly  Extremities: Trace pitting edema.   No cyanosis.  Extremities warm.  2+ pedal pulses.   Neurological: Alert and oriented x 3.  " Normal speech and affect, no gross motor deficits  Skin:  No ecchymoses or rashes.     Labs:  CBC RESULTS:  Lab Results   Component Value Date    WBC 10.7 10/01/2018    RBC 4.97 10/01/2018    HGB 14.9 10/01/2018    HCT 46.3 10/01/2018    MCV 93 10/01/2018    MCH 30.0 10/01/2018    MCHC 32.2 10/01/2018    RDW 14.6 10/01/2018     10/01/2018       CMP RESULTS:  Lab Results   Component Value Date     2018    POTASSIUM 3.7 2018    CHLORIDE 106 2018    CO2 26 2018    ANIONGAP 10 2018    GLC 62 (L) 2018    BUN 26 2018    CR 1.19 2018    GFRESTIMATED 64 2018    GFRESTBLACK 78 2018    ROB 8.8 2018    BILITOTAL 1.4 (H) 10/01/2018    ALBUMIN 4.2 10/01/2018    ALKPHOS 67 10/01/2018    ALT 34 10/01/2018    AST 21 10/01/2018        INR RESULTS:  Lab Results   Component Value Date    INR 2.3 (A) 2017    INR 2.36 (H) 2017       No components found for: CK  Lab Results   Component Value Date    MAG 2.4 (H) 2017     Lab Results   Component Value Date    NTBNP 1599 (H) 2017     @BRIEFLABR (dig)@    Most recent echocardiogram:  Recent Results (from the past 8760 hour(s))   Echocardiogram Complete    Narrative    [id number]  [facility]19  [id number]  [id number]^SHANTELL^SYDNEE^           [facility]  Diagnostic and Treamtent-3rd Floor  [address] [place] [address]     Name: SYDNIE SIERRA  MRN: [id number]  : 1965  Study Date: 2018 07:55 AM  Age: 52 yrs  Gender: Male  Patient Location: [facility]  Reason For Study: 1 year s/p MitraClip  Ordering Physician: SYDNEE STINSON  Referring Physician: SYDNEE STINSON  Performed By: RAE Agarwal     BSA: 1.6 m2  Height: 62 in  Weight: 126 lb  BP: 130/91 mmHg  _____________________________________________________________________________  __        Procedure  Echocardiogram with two-dimensional, color and spectral Doppler  performed.  _____________________________________________________________________________  __        Interpretation Summary  The Ejection Fraction is estimated at 30-35%. Left ventricular size is normal.  Global right ventricular function is mildly reduced. The right ventricle is  normal size.  s/p MitralClip with MV mPG of 4mmHg at 90 bpm. Trace mitral insufficiency is  present.  Moderate tricuspid insufficiency is present.  This study was compared with the study from 12/15/17 .  There has been no change.  _____________________________________________________________________________  __        Left Ventricle  Left ventricular size is normal. Left ventricular wall thickness is normal.  The Ejection Fraction is estimated at 30-35%. Grade III or advanced diastolic  dysfunction. Akinesis of the basal to apical inferior, basal infero-lateral  and infero-septal segments.     Right Ventricle  The right ventricle is normal size. Global right ventricular function is  mildly reduced. A pacemaker lead is noted in the right ventricle.     Atria  Both atria appear normal. The atrial septum is intact as assessed by color  Doppler . A pacemaker lead is noted in the right atrium.     Mitral Valve  Trace mitral insufficiency is present. s/p MitralClip with MV mPG of 4mmHg at  90 bpm.        Aortic Valve  Aortic valve is normal in structure and function. The aortic valve is  tricuspid.     Tricuspid Valve  Moderate tricuspid insufficiency is present. The right ventricular systolic  pressure is approximated at 41.7 mmHg plus the right atrial pressure. Etiology  of TR is leaflet restriction by ICD lead.     Pulmonic Valve  The pulmonic valve is normal. Trace pulmonic insufficiency is present.     Vessels  The aorta root is normal. The pulmonary artery is normal. The inferior vena  cava was normal in size with preserved respiratory variability.     Pericardium  No pericardial effusion is present.        Compared to Previous Study  This  study was compared with the study from 12/15/17 . There has been no  change.     Attestation  I have personally viewed the imaging and agree with the interpretation and  report as documented by the fellow, Kirby Rodriguez, and/or edited by me.  _____________________________________________________________________________  __     MMode/2D Measurements & Calculations  IVSd: 0.84 cm  LVIDd: 5.0 cm  LVIDs: 4.5 cm  LVPWd: 0.89 cm  FS: 9.6 %  LV mass(C)d: 149.1 grams  LV mass(C)dI: 94.9 grams/m2  Ao root diam: 2.6 cm  asc Aorta Diam: 3.0 cm  LVOT diam: 2.0 cm  LVOT area: 3.1 cm2     EF(MOD-bp): 26.7 %  LA Volume (BP): 42.8 ml     LA Volume Index (BP): 27.3 ml/m2  RWT: 0.36        Doppler Measurements & Calculations  MV E max frank: 129.0 cm/sec  MV A max frank: 62.4 cm/sec  MV E/A: 2.1  MV max PG: 10.9 mmHg  MV mean PG: 3.6 mmHg  MV V2 VTI: 26.1 cm  MVA(VTI): 1.4 cm2  MV dec time: 0.10 sec  Ao V2 max: 97.9 cm/sec  Ao max P.0 mmHg  Ao V2 mean: 71.0 cm/sec  Ao mean P.3 mmHg  Ao V2 VTI: 18.3 cm  MICHELLE(I,D): 2.0 cm2  MICHELLE(V,D): 2.2 cm2  LV V1 max P.9 mmHg  LV V1 max: 68.9 cm/sec  LV V1 VTI: 12.0 cm  SV(LVOT): 37.2 ml  SI(LVOT): 23.7 ml/m2  PA V2 max: 65.2 cm/sec  PA max P.7 mmHg  PA acc time: 0.13 sec  PI end-d frank: 160.2 cm/sec  TR max frank: 322.8 cm/sec  TR max P.7 mmHg  AV Frank Ratio (DI): 0.70     MICHELLE Index (cm2/m2): 1.3  E/E' av.1  Lateral E/e': 32.3  Medial E/e': 35.8     _____________________________________________________________________________  __           Report approved by: Isabel Love 2018 09:49 AM      Echocardiogram Limited    Narrative    [id number]  [facility]  [id number]  [id number]^CHRISTIANSON^IVA^AMBER           [facility]  Diagnostic and Treamtent-3rd Floor  [address] [place] [address]     Name: SIERRASYDNIE  MRN: [id number]  : 1965  Study Date: 12/15/2017 10:52 AM  Age: 52 yrs  Gender: Male  Patient Location:  [facility]  Reason For Study: s/p Karena Clip  Ordering Physician: IVA CHRISTIANSON  Referring Physician: IVA CHRISTIANSON  Performed By: Sandra Fields RDCS     BSA: 1.6 m2  Height: 62 in  Weight: 123 lb  BP: 100/74 mmHg  _____________________________________________________________________________  __        Procedure  Limited Echocardiogram with portions of two-dimensional, color and spectral  Doppler performed.  _____________________________________________________________________________  __        Interpretation Summary  Moderately reduced systolic function. EF 30-35%. Global hypokinesis with  akinesis of the basal-mid inferior and basal-mid inferolateral walls.  Global right ventricular function is mildly reduced.  Status post transcatheter mitral valve repair with Mitraclip. The clip is  attached to the leaflets. There is trace to mild mitral regurgitation. Mean  gradient 5 mmHg at heart rate 97 bpm.  Pulmonary hypertension with right ventricular systolic pressure 41mmHg above  the right atrial pressure present.  No pericardial effusion.     Compared to the prior study 8/10/17 there has been no significant change.  _____________________________________________________________________________  __        Left Ventricle  Left ventricular wall thickness is normal. Mild left ventricular dilation is  present. Global hypokinesis with akinesis of the basal-mid inferior and basal-  mid inferolateral walls. Moderately reduced systolic function. EF 30-35%.  Diastolic function not assessed.     Right Ventricle  The right ventricle is normal size. Global right ventricular function is  mildly reduced. A pacemaker lead is noted in the right ventricle.     Atria  Both atria appear normal.     Mitral Valve  Status post transcatheter mitral valve repair with Mitraclip. The clip is  attached to the leaflets. There is trace to mild mitral regurgitation. Mean  gradient 5 mmHg at heart rate 97 bpm.        Aortic Valve  Aortic valve  is normal in structure and function.     Tricuspid Valve  Mild tricuspid insufficiency is present. Right ventricular systolic pressure  is 41mmHg above the right atrial pressure.     Pulmonic Valve  The pulmonic valve is normal.     Vessels  The aorta root is normal. The inferior vena cava was normal in size with  preserved respiratory variability. Estimated mean right atrial pressure is 3  mmHg.     Pericardium  No pericardial effusion is present.        Attestation  I have personally viewed the imaging and agree with the interpretation and  report as documented by the fellow, David Hill, and/or edited by me.  _____________________________________________________________________________  __  MMode/2D Measurements & Calculations     IVSd: 0.72 cm  LVIDd: 4.9 cm  LVIDs: 4.2 cm  LVPWd: 0.73 cm  FS: 14.2 %  EDV(Teich): 115.5 ml  ESV(Teich): 80.6 ml  LV mass(C)d: 118.2 grams  LV mass(C)dI: 76.1 grams/m2     EF(MOD-bp): 34.1 %  LA Volume (BP): 49.5 ml  LA Volume Index (BP): 31.9 ml/m2  RWT: 0.30           Doppler Measurements & Calculations  MV E max isbael: 126.4 cm/sec  MV A max isabel: 46.5 cm/sec  MV E/A: 2.7  MV max P.7 mmHg  MV mean P.6 mmHg  MV V2 VTI: 26.5 cm  MV dec time: 0.13 sec  TR max isabel: 321.2 cm/sec  TR max P.3 mmHg     _____________________________________________________________________________  __           Report approved by: Isabel Alex 12/15/2017 12:24 PM            Assessment and Plan:    Luke is a 53 year old gentleman with a past medical history of ICM who appears hypervolemic today.  I have increased his Bumex to 1 mg daily and started potassium 20 meq daily.  He will repeat a BMP in one week.  If his potassium and creatinine appear stable, the plan would be to increase his Entresto further.  He will return to [facility] clinic in one month.     1.  Chronic systolic heart failure secondary to ischemic cardiomyopathy.  Stage D NYHA Class II  ACEi/ARB: Entresto 49/51 mg twice  daily.  BB: yes, Toprol  mg daily.   Aldosterone antagonist: Deferred due to worsening renal function with previous attempts.   SCD prophylaxis: ICD  Fluid status: Hypervolemic.   Anticoagulation: None.   Antiplatelet:  ASA dose   Sleep apnea:  Not documented.  NSAID use:  Contraindicated.  Avoid use.  Remote Monitoring: None.   RV support:  Continue Digoxin.     2.  CAD:  Stable.  Continue Plavix, ASA, Metoprolol, Lisinopril, and Atorvastatin        3.  Mitral valve repair s/p mitral clip:  Follow up with Dr. Amaral as scheduled in April 2019.       4. HTN: Elevated at 143/90 today, but controlled at home. Continue to monitor.  Continue titrating Entresto, which will allow for better BP control.       6. Follow up:  BMP in one week. [facility] in one month.  Dr. Amaral as scheduled in April.       Vandana VENEGAS, CNP  [facility]

## 2018-11-13 NOTE — NURSING NOTE
"Chief Complaint   Patient presents with     RECHECK     53yr old male with a h/o chronic systolic heart failure secondary to ICM s/p mitraclip and complicated revascularization presenting for HF follow-up with labs prior.       /90 (BP Location: Right arm, Patient Position: Sitting, Cuff Size: Adult Regular)  Pulse 77  Resp 16  Ht 1.575 m (5' 2\")  Wt 57.4 kg (126 lb 8 oz)  SpO2 99%  BMI 23.14 kg/m2    Anabell Quezada [facility]  11/13/2018 9:22 AM      "

## 2018-11-20 ENCOUNTER — CARE COORDINATION (OUTPATIENT)
Dept: CARDIOLOGY | Facility: CLINIC | Age: 53
End: 2018-11-20

## 2018-11-20 DIAGNOSIS — I25.5 ISCHEMIC CARDIOMYOPATHY: ICD-10-CM

## 2018-11-20 LAB
ANION GAP SERPL CALCULATED.3IONS-SCNC: 8 MMOL/L (ref 3–14)
BUN SERPL-MCNC: 32 MG/DL (ref 7–30)
CALCIUM SERPL-MCNC: 9.7 MG/DL (ref 8.5–10.1)
CHLORIDE SERPL-SCNC: 102 MMOL/L (ref 94–109)
CO2 SERPL-SCNC: 29 MMOL/L (ref 20–32)
CREAT SERPL-MCNC: 1.26 MG/DL (ref 0.66–1.25)
GFR SERPL CREATININE-BSD FRML MDRD: 60 ML/MIN/1.7M2
GLUCOSE SERPL-MCNC: 137 MG/DL (ref 70–99)
POTASSIUM SERPL-SCNC: 4.4 MMOL/L (ref 3.4–5.3)
SODIUM SERPL-SCNC: 139 MMOL/L (ref 133–144)

## 2018-11-20 PROCEDURE — 36415 COLL VENOUS BLD VENIPUNCTURE: CPT | Performed by: NURSE PRACTITIONER

## 2018-11-20 PROCEDURE — 80048 BASIC METABOLIC PNL TOTAL CA: CPT | Performed by: NURSE PRACTITIONER

## 2018-11-20 RX ORDER — BUMETANIDE 0.5 MG/1
0.5 TABLET ORAL DAILY
Qty: 45 TABLET | Refills: 3 | Status: SHIPPED | OUTPATIENT
Start: 2018-11-20 | End: 2018-12-04

## 2018-11-20 NOTE — PROGRESS NOTES
Called to review lab results. Per Luke at previous visit, ok to call wife Nathaniel to review results. Nathaniel had her  with her. Reviewed results. Gave following orders:    Date: 11/20/2018    Time of Call: 1:33 PM     Diagnosis:  Heart failure     [ VORB ] Ordering provider: Vandana VENEGAS CNP  Order: Decrease Bumex to 0.5 mg daily. Stop potassium supplement. Repeat labs in 2 weeks with clinic visit.      Order received by: Soraya Messina RN      Follow-up/additional notes: communicated this to Nathaniel via  who verbalized understanding.

## 2018-11-26 ENCOUNTER — TELEPHONE (OUTPATIENT)
Dept: CARDIOLOGY | Facility: CLINIC | Age: 53
End: 2018-11-26

## 2018-11-26 NOTE — TELEPHONE ENCOUNTER
Pharmacy requesting to refill Lisinopril 2.5 mg.  This medication is not on the patient's list.    Pharmacy: [facility] pharmacy  Phone: [phone]  Fax: [phone]

## 2018-11-27 NOTE — TELEPHONE ENCOUNTER
Called pharmacy to clarify - instructed to take Lisinopril off list as Entresto replaced it. Called Luke's wife Van to ensure he did discontinue Lisinopril, which he did.     Soraya Messina RN

## 2018-11-29 DIAGNOSIS — I25.5 ISCHEMIC CARDIOMYOPATHY: Primary | ICD-10-CM

## 2018-12-04 ENCOUNTER — OFFICE VISIT (OUTPATIENT)
Dept: CARDIOLOGY | Facility: CLINIC | Age: 53
End: 2018-12-04
Attending: NURSE PRACTITIONER
Payer: COMMERCIAL

## 2018-12-04 VITALS
DIASTOLIC BLOOD PRESSURE: 99 MMHG | BODY MASS INDEX: 23.79 KG/M2 | HEIGHT: 62 IN | WEIGHT: 129.3 LBS | OXYGEN SATURATION: 98 % | SYSTOLIC BLOOD PRESSURE: 144 MMHG | HEART RATE: 80 BPM

## 2018-12-04 DIAGNOSIS — I25.10 CORONARY ARTERY DISEASE INVOLVING NATIVE CORONARY ARTERY OF NATIVE HEART WITHOUT ANGINA PECTORIS: Primary | ICD-10-CM

## 2018-12-04 DIAGNOSIS — I25.5 ISCHEMIC CARDIOMYOPATHY: ICD-10-CM

## 2018-12-04 DIAGNOSIS — Z98.890 HISTORY OF MITRAL VALVE REPAIR: ICD-10-CM

## 2018-12-04 DIAGNOSIS — I10 BENIGN ESSENTIAL HYPERTENSION: ICD-10-CM

## 2018-12-04 DIAGNOSIS — I50.21 ACUTE SYSTOLIC CONGESTIVE HEART FAILURE (H): ICD-10-CM

## 2018-12-04 LAB
ANION GAP SERPL CALCULATED.3IONS-SCNC: 7 MMOL/L (ref 3–14)
BUN SERPL-MCNC: 23 MG/DL (ref 7–30)
CALCIUM SERPL-MCNC: 8.7 MG/DL (ref 8.5–10.1)
CHLORIDE SERPL-SCNC: 106 MMOL/L (ref 94–109)
CO2 SERPL-SCNC: 26 MMOL/L (ref 20–32)
CREAT SERPL-MCNC: 1.03 MG/DL (ref 0.66–1.25)
GFR SERPL CREATININE-BSD FRML MDRD: 76 ML/MIN/1.7M2
GLUCOSE SERPL-MCNC: 120 MG/DL (ref 70–99)
POTASSIUM SERPL-SCNC: 4.4 MMOL/L (ref 3.4–5.3)
SODIUM SERPL-SCNC: 139 MMOL/L (ref 133–144)

## 2018-12-04 PROCEDURE — 99214 OFFICE O/P EST MOD 30 MIN: CPT | Mod: ZP | Performed by: NURSE PRACTITIONER

## 2018-12-04 PROCEDURE — 36415 COLL VENOUS BLD VENIPUNCTURE: CPT | Performed by: NURSE PRACTITIONER

## 2018-12-04 PROCEDURE — 80048 BASIC METABOLIC PNL TOTAL CA: CPT | Performed by: NURSE PRACTITIONER

## 2018-12-04 PROCEDURE — G0463 HOSPITAL OUTPT CLINIC VISIT: HCPCS | Mod: ZF

## 2018-12-04 RX ORDER — BUMETANIDE 0.5 MG/1
TABLET ORAL
Qty: 45 TABLET | Refills: 3 | Status: SHIPPED | OUTPATIENT
Start: 2018-12-04 | End: 2019-10-16

## 2018-12-04 ASSESSMENT — PAIN SCALES - GENERAL: PAINLEVEL: NO PAIN (0)

## 2018-12-04 NOTE — MR AVS SNAPSHOT
After Visit Summary   12/4/2018    Luke Henao    MRN: [id number]           Patient Information     Date Of Birth          1965        Visit Information        Provider Department      12/4/2018 9:45 AM Vandana Zambrano NP; [facility]        Today's Diagnoses     Ischemic cardiomyopathy          Care Instructions    You were seen today in the Cardiovascular Clinic at the [facility].     Cardiology Providers you saw during your visit: Vandana Zambrano APRN, CNP      Follow up and medication changes:    1. Increase Bumex to 1 mg daily (2 tabs) until lose 3-4 pounds. Then go to 0.5 mg (1 tab) Bumex daily.   2. Increase Entresto to 97/103 mg tab twice daily. New prescription has been sent.   3. Repeat labs in one week.   4. Follow up in CORE Clinic in one month.     Results for LUKE HENAO (MRN [id number]) as of 12/4/2018 10:04   Ref. Range 12/4/2018 09:13   Sodium Latest Ref Range: 133 - 144 mmol/L 139   Potassium Latest Ref Range: 3.4 - 5.3 mmol/L 4.4   Chloride Latest Ref Range: 94 - 109 mmol/L 106   Carbon Dioxide Latest Ref Range: 20 - 32 mmol/L 26   Urea Nitrogen Latest Ref Range: 7 - 30 mg/dL 23   Creatinine Latest Ref Range: 0.66 - 1.25 mg/dL 1.03   GFR Estimate Latest Ref Range: >60 mL/min/1.7m2 76   GFR Estimate If Black Latest Ref Range: >60 mL/min/1.7m2 >90   Calcium Latest Ref Range: 8.5 - 10.1 mg/dL 8.7   Anion Gap Latest Ref Range: 3 - 14 mmol/L 7   Glucose Latest Ref Range: 70 - 99 mg/dL 120 (H)           Please limit your fluid intake to 2 L (68 ounces) daily.  2 Liters a day = 8.5 cups, or 68 ounces.  Please limit your salt intake to 2 grams a day or less.     If you gain 2# in 24 hours or 5# in one week call Soraya Messina RN so we can adjust your medications as needed over the phone.     Please feel free to call me with any questions or concerns.       Soraya Messina RN [facility]  [facility]  Cardiology  "Care Coordinator-Heart Failure Clinic     Questions and [phone].   First press #1 for the University and then press #3 for \"Medical Questions\" to reach us Cardiology Nurses.      On Call Cardiologist for after hours or on weekends: [phone]   option #4 and ask to speak to the on-call Cardiologist. Inform them you are a CORE/heart failure patient at the [facility].           If you need a medication refill please contact your pharmacy.  Please allow 3 business days for your refill to be completed.  _______________________________________________________  C.O.R.E. CLINIC Cardiomyopathy, Optimization, Rehabilitation, Education   The C.O.R.E. CLINIC is a heart failure specialty clinic within the [facility] where you will work with specialized nurse practitioners dedicated to helping patients with heart failure carefully adjust medications, receive education, and learn who and when to call if symptoms develop. They specialize in helping you better understand your condition, slow the progression of your disease, improve the length and quality of your life, help you detect future heart problems before they become life threatening, and avoid hospitalizations.  As always, thank you for trusting us with your health care needs!             Follow-ups after your visit        Additional Services     [facility] with [facility]                 Your next 10 appointments already scheduled     Dec 11, 2018  9:00 AM CST   [facility]   [facility]    [address] [address] [phone]           Please do not eat 10-12 hours before your appointment if you are coming in fasting for labs on lipids, cholesterol, or glucose (sugar). This does not apply to pregnant women. Water, hot tea and black coffee (with nothing added) are okay. Do not drink other fluids, diet soda or chew gum.    "         Kenji 15, 2019 10:30 AM CST   Lab with  LAB    [facility]    [address] [place] [address]  [facility] [id number] [phone]            Kenji 15, 2019 11:00 AM CST   (Arrive by 10:45 AM)   CORE RETURN with Vandana Zambrano NP   [facility]    [address] [place] [address]  [address] [place] [id number] [phone]            Apr 02, 2019 10:45 AM CDT   Office Visit with Shanna Church MD   [facility]    [address] [address] [phone]           Bring a current list of meds and any records pertaining to this visit. For Physicals, please bring immunization records and any forms needing to be filled out. Please arrive 10 minutes early to complete paperwork.            Apr 03, 2019  9:30 AM CDT   Echo Complete with [id number]   [facility] ([facility]    [address] [place] [id number] [phone]           1. Please bring or wear a comfortable two-piece outfit. 2. You may eat, drink and take your normal medicines. 3. For any questions that cannot be answered, please contact the ordering physician            Apr 03, 2019 10:00 AM CDT   (Arrive by 9:45 AM)   Return Visit with Amanda Amaral MD   [facility]    [address] [place] [address]  [address] [place] [id number] [phone]            Apr 03, 2019 10:30 AM CDT   (Arrive by 10:15 AM)   Implanted Defibulator with  CV DEVICE 1   [facility]    [address] [place] [address]  [facility] [address] [phone]              Future tests that were ordered for you today     Open Future Orders        Priority Expected Expires Ordered    Follow-Up with CORE Clinic Routine 1/4/2019 3/11/2019  "12/4/2018    Basic metabolic panel Routine 12/11/2018 12/4/2019 12/4/2018            Who to contact     If you have questions or need follow up information about today's clinic visit or your schedule please contact [facility] directly at [phone].  Normal or non-critical lab and imaging results will be communicated to you by MyChart, letter or phone within 4 business days after the clinic has received the results. If you do not hear from us within 7 days, please contact the clinic through MyChart or phone. If you have a critical or abnormal lab result, we will notify you by phone as soon as possible.  Submit refill requests through [employer] or call your pharmacy and they will forward the refill request to us. Please allow 3 business days for your refill to be completed.          Additional Information About Your Visit        Care EveryWhere ID     This is your Care EveryWhere ID. This could be used by other organizations to access your [place] medical records  [id number]        Your Vitals Were     Pulse Height Pulse Oximetry BMI (Body Mass Index)          80 1.575 m (5' 2\") 98% 23.65 kg/m2         Blood Pressure from Last 3 Encounters:   12/04/18 (!) 144/99   11/13/18 143/90   11/06/18 (!) 150/94    Weight from Last 3 Encounters:   12/04/18 58.7 kg (129 lb 4.8 oz)   11/13/18 57.4 kg (126 lb 8 oz)   11/06/18 59.4 kg (131 lb)              We Performed the Following     Follow-Up with [facility]          Today's Medication Changes          These changes are accurate as of 12/4/18 10:45 AM.  If you have any questions, ask your nurse or doctor.               Start taking these medicines.        Dose/Directions    sacubitril-valsartan  MG per tablet   Commonly known as:  ENTRESTO   Used for:  Ischemic cardiomyopathy   Replaces:  sacubitril-valsartan 49-51 MG per tablet   Started by:  Vandana Zambrano NP        Dose:  1 tablet   Take 1 tablet by mouth 2 times daily   Quantity:  60 tablet "   Refills:  3         These medicines have changed or have updated prescriptions.        Dose/Directions    bumetanide 0.5 MG tablet   Commonly known as:  BUMEX   This may have changed:    - how much to take  - how to take this  - when to take this  - additional instructions   Used for:  Ischemic cardiomyopathy   Changed by:  Vandana Zambrano NP        Take 2 tabs daily until weight down 3 lbs. Then reduce dose to 0.5 mg daily.   Quantity:  45 tablet   Refills:  3         Stop taking these medicines if you haven't already. Please contact your care team if you have questions.     sacubitril-valsartan 49-51 MG per tablet   Commonly known as:  ENTRESTO   Replaced by:  sacubitril-valsartan  MG per tablet   Stopped by:  Vandana Zambrano NP                Where to get your medicines      These medications were sent to [facility]/pharmacy #[id number] [address] [address]     Phone:  [phone]     bumetanide 0.5 MG tablet    sacubitril-valsartan  MG per tablet                Primary Care Provider Office Phone # Fax #    Shanna Church [phone]       [address] [facility] [address]        Equal Access to Services     CUBA AYALA AH: Hadii linda ku hadasho Soomaali, waaxda luqadaha, qaybta kaalmada adeegyada, waxay idiin hayefra rosen. So [facility] [phone].    ATENCIÓN: Si habla español, tiene a suarez disposición servicios gratuitos de asistencia lingüística. [facility] [phone].    We comply with applicable federal civil rights laws and [place] laws. We do not discriminate on the basis of race, color, national origin, age, disability, sex, sexual orientation, or gender identity.            Thank you!     Thank you for choosing [facility]  for your care. Our goal is always to provide you with excellent care. Hearing back from our patients is one way we can continue to improve our services. Please take a few  minutes to complete the written survey that you may receive in the mail after your visit with us. Thank you!             Your Updated Medication List - Protect others around you: Learn how to safely use, store and throw away your medicines at www.disposemymeds.org.          This list is accurate as of 12/4/18 10:45 AM.  Always use your most recent med list.                   Brand Name Dispense Instructions for use Diagnosis    aspirin 81 MG chewable tablet    ASA    90 tablet    Take 1 tablet (81 mg) by mouth daily    Coronary artery disease involving native coronary artery of native heart without angina pectoris       atorvastatin 40 MG tablet    LIPITOR    60 tablet    Take 1 tablet (40 mg) by mouth daily    Coronary artery disease involving native coronary artery of native heart without angina pectoris, Cardiogenic shock (H)       bumetanide 0.5 MG tablet    BUMEX    45 tablet    Take 2 tabs daily until weight down 3 lbs. Then reduce dose to 0.5 mg daily.    Ischemic cardiomyopathy       cetirizine 10 MG tablet    zyrTEC    60 tablet    TAKE 1 TABLET (10 MG) BY MOUTH DAILY    Environmental allergies       digoxin 125 MCG tablet    LANOXIN    60 tablet    Take one tablet on M, W, F, Sunday.    Coronary artery disease involving native coronary artery of native heart without angina pectoris       fluticasone 50 MCG/ACT nasal spray    FLONASE    3 Bottle    Spray 2 sprays into both nostrils daily    Nasal congestion       glipiZIDE 5 MG tablet    GLUCOTROL    180 tablet    Take 1 tablet (5 mg) by mouth 2 times daily (before meals)    Type 2 diabetes mellitus without complication, without long-term current use of insulin (H)       insulin pen needle 32G X 4 MM miscellaneous    BD KYLEE U/F    100 each    Use 3 daily or as directed.    Diabetes mellitus type 2, insulin dependent (H)       metFORMIN 500 MG 24 hr tablet    GLUCOPHAGE-XR    180 tablet    Take 1,000 mg by mouth daily (with dinner)    Type 2 diabetes  mellitus without complication, without long-term current use of insulin (H)       metoprolol succinate ER 50 MG 24 hr tablet    TOPROL-XL    180 tablet    Take 3 tablets (150 mg) by mouth daily    Chronic systolic congestive heart failure (H)       ONETOUCH ULTRA test strip   Generic drug:  blood glucose monitoring     100 strip    USE TO TEST BLOOD SUGAR 3 TIMES DAILY OR AS DIRECTED.    Diabetes mellitus type 2, insulin dependent (H)       sacubitril-valsartan  MG per tablet    ENTRESTO    60 tablet    Take 1 tablet by mouth 2 times daily    Ischemic cardiomyopathy

## 2018-12-04 NOTE — PATIENT INSTRUCTIONS
"You were seen today in the Cardiovascular Clinic at the [facility].     Cardiology Providers you saw during your visit: Vandana VENEGAS CNP      Follow up and medication changes:    1. Increase Bumex to 1 mg daily (2 tabs) until lose 3-4 pounds. Then go to 0.5 mg (1 tab) Bumex daily.   2. Increase Entresto to 97/103 mg tab twice daily. New prescription has been sent.   3. Repeat labs in one week.   4. Follow up in CORE Clinic in one month.     Results for SYDNIE SIERRA (MRN [id number]) as of 2018 10:04   Ref. Range 2018 09:13   Sodium Latest Ref Range: 133 - 144 mmol/L 139   Potassium Latest Ref Range: 3.4 - 5.3 mmol/L 4.4   Chloride Latest Ref Range: 94 - 109 mmol/L 106   Carbon Dioxide Latest Ref Range: 20 - 32 mmol/L 26   Urea Nitrogen Latest Ref Range: 7 - 30 mg/dL 23   Creatinine Latest Ref Range: 0.66 - 1.25 mg/dL 1.03   GFR Estimate Latest Ref Range: >60 mL/min/1.7m2 76   GFR Estimate If Black Latest Ref Range: >60 mL/min/1.7m2 >90   Calcium Latest Ref Range: 8.5 - 10.1 mg/dL 8.7   Anion Gap Latest Ref Range: 3 - 14 mmol/L 7   Glucose Latest Ref Range: 70 - 99 mg/dL 120 (H)           Please limit your fluid intake to 2 L (68 ounces) daily.  2 Liters a day = 8.5 cups, or 68 ounces.  Please limit your salt intake to 2 grams a day or less.     If you gain 2# in 24 hours or 5# in one week call Soraya Messina RN so we can adjust your medications as needed over the phone.     Please feel free to call me with any questions or concerns.       Soraya Messina RN CHFN  [facility]  Cardiology Care Coordinator-Heart Failure Clinic     Questions and [phone].   First press #1 for the University and then press #3 for \"Medical Questions\" to reach us Cardiology Nurses.      On Call Cardiologist for after hours or on weekends: [phone]   option #4 and ask to speak to the on-call Cardiologist. Inform them you are a CORE/heart failure patient at the " [facility].           If you need a medication refill please contact your pharmacy.  Please allow 3 business days for your refill to be completed.  _______________________________________________________  C.O.R.E. CLINIC Cardiomyopathy, Optimization, Rehabilitation, Education   The C.O.R.E. CLINIC is a heart failure specialty clinic within the [facility] where you will work with specialized nurse practitioners dedicated to helping patients with heart failure carefully adjust medications, receive education, and learn who and when to call if symptoms develop. They specialize in helping you better understand your condition, slow the progression of your disease, improve the length and quality of your life, help you detect future heart problems before they become life threatening, and avoid hospitalizations.  As always, thank you for trusting us with your health care needs!

## 2018-12-04 NOTE — NURSING NOTE
Vitals completed successfully and medication reconciled. Radha De Anda MA  Chief Complaint   Patient presents with     Follow Up For     Return CORE; 53yr old male with a h/o chronic systolic heart failure secondary to ICM s/p mitraclip and complicated revascularization presenting for HF follow-up with labs prior.

## 2018-12-04 NOTE — PROGRESS NOTES
HPI: Luke is a 53 year old gentleman with a past medical history of ICM, CAD with a RCA STEMI s/p PCI x 7 to RCA, left circumflex (now ), and LAD on 3/27/17 complicated by PEDRO, sepsis, sacral ulcer, DVT, bilateral hemispheric infarcts secondary to watershed ischemia, and cardiogenic shock s/p IABP, who was later transitioned to a subclavian balloon pump and underwent mitral clip for ischemic MR.   He returns to clinic for routine follow up.    Luke is feeling more fatigued, but has been ill with a cold.  He notes more congestion in his face in the am upon waking, +PND a few times this week.  Denies RAYMOND, palpitations, lightheadedness, lower extremity edema, early satiety, or chest pain.      Home weight has been between 125-127 lbs.  BP at home has been lower between 119-109/79-83.    He has been drinking 36 ounces of fluid daily.  Admits he ate more sodium than normal.         PAST MEDICAL HISTORY:  Past Medical History:   Diagnosis Date     Acne      CAD (coronary artery disease) 2017    RCA STEMI s/p balloon angioplasty and multiple Sofie to RCA, LCx, and LAD     Cardiogenic shock (H)      DVT (deep venous thrombosis) (H) 2017    left internal jugular (line-associated); left common femoral; on coumadin     Ischemic cardiomyopathy 2017    EF 30-35%     Ischemic stroke (H) 2017    no residual deficits     Lower GI bleed 2017    due to rectal ulcer     Mitral valve insufficiency, ischemic 2017    s/p Mitral Clip placement      Skin ulcer of sacrum (H)      Systolic heart failure (H)      Type 2 diabetes mellitus (H)        FAMILY HISTORY:  Family History   Problem Relation Age of Onset     Hypertension Mother      Type 2 Diabetes Father      Hypertension Father      Myocardial Infarction No family hx of      Cerebrovascular Disease No family hx of      Coronary Artery Disease Early Onset No family hx of      Colon Cancer No family hx of      Prostate Cancer No family hx of        SOCIAL HISTORY:  Social History      Social History     Marital status:      Spouse name: N/A     Number of children: N/A     Years of education: N/A     Occupational History     [employer]      Social History Main Topics     Smoking status: Former Smoker     Packs/day: 0.50     Years: 30.00     Types: Cigarettes     Quit date: 1/1/2017     Smokeless tobacco: Never Used     Alcohol use No     Drug use: No     Sexual activity: Not Currently     Other Topics Concern     None     Social History Narrative    . Remarried.    4 children with first marriage.    2 grand children.    Walks daily, but no formal exercise.            CURRENT MEDICATIONS:  Current Outpatient Prescriptions   Medication Sig Dispense Refill     aspirin 81 MG chewable tablet Take 1 tablet (81 mg) by mouth daily 90 tablet 3     atorvastatin (LIPITOR) 40 MG tablet Take 1 tablet (40 mg) by mouth daily 60 tablet 5     bumetanide (BUMEX) 0.5 MG tablet Take 1 tablet (0.5 mg) by mouth daily 45 tablet 3     cetirizine (ZYRTEC) 10 MG tablet TAKE 1 TABLET (10 MG) BY MOUTH DAILY 60 tablet 10     digoxin (LANOXIN) 125 MCG tablet Take one tablet on M, W, F, Sunday. 60 tablet 5     fluticasone (FLONASE) 50 MCG/ACT spray Spray 2 sprays into both nostrils daily 3 Bottle 3     glipiZIDE (GLUCOTROL) 5 MG tablet Take 1 tablet (5 mg) by mouth 2 times daily (before meals) 180 tablet 3     insulin pen needle (BD KYLEE U/F) 32G X 4 MM Use 3 daily or as directed. (Patient not taking: Reported on 11/6/2018) 100 each prn     metFORMIN (GLUCOPHAGE-XR) 500 MG 24 hr tablet Take 1,000 mg by mouth daily (with dinner)  180 tablet 3     metoprolol succinate (TOPROL-XL) 50 MG 24 hr tablet Take 3 tablets (150 mg) by mouth daily 180 tablet 3     ONETOUCH ULTRA test strip USE TO TEST BLOOD SUGAR 3 TIMES DAILY OR AS DIRECTED. 100 strip 3     sacubitril-valsartan (ENTRESTO) 49-51 MG per tablet Take 1 tablet by mouth 2 times daily 60 tablet 1       ROS:  Constitutional: Denies fever, chills, or  "diaphoresis.   No significant weight fluctuations.   ENT: See HPI.    Respiratory:  See HPI  Cardiovascular: As per HPI.   GI: Denies nausea, vomiting, diarrhea, hematemesis, melena, or hematochezia.   : Denies urinary frequency, dysuria, or hematuria.   Integument: Negative for bruising, rash, or pruritis.  Psychiatric: Negative for anxiety, depression, sleep disturbance, or mood changes.   Neuro: Negative for headaches, syncope, numbness or tingling.   Endocrinology: Negative for thyroid disorder, night sweats, diabetes.   Musculoskeletal: Negative for muscle weakness or gout.     EXAM:  BP (!) 144/99 (BP Location: Left arm, Patient Position: Chair, Cuff Size: Adult Regular)  Pulse 80  Ht 1.575 m (5' 2\")  Wt 58.7 kg (129 lb 4.8 oz)  SpO2 98%  BMI 23.65 kg/m2  General: alert, articulate, and in no acute distress.  HEENT: normocephalic, atraumatic, anicteric sclera, EOMI, mucosa moist, no cyanosis.   Neck: neck supple. JVP 11 cm.   Heart: regular rhythm, normal S1/S2, no murmur, gallop, rub.  Precordium quiet with normal PMI.     Lungs: clear, no rales, ronchi, or wheezing.  No accessory muscle use, respirations unlabored.   Abdomen: Soft, non-tender, bowel sounds present, no hepatomegaly  Extremities: Trace pitting edema.   No cyanosis.  Extremities warm.  2+ pedal pulses.   Neurological: Alert and oriented x 3.  Normal speech and affect, no gross motor deficits  Skin:  No ecchymoses or rashes.     Labs:  CBC RESULTS:  Lab Results   Component Value Date    WBC 10.7 10/01/2018    RBC 4.97 10/01/2018    HGB 14.9 10/01/2018    HCT 46.3 10/01/2018    MCV 93 10/01/2018    MCH 30.0 10/01/2018    MCHC 32.2 10/01/2018    RDW 14.6 10/01/2018     10/01/2018       CMP RESULTS:  Lab Results   Component Value Date     11/20/2018    POTASSIUM 4.4 11/20/2018    CHLORIDE 102 11/20/2018    CO2 29 11/20/2018    ANIONGAP 8 11/20/2018     (H) 11/20/2018    BUN 32 (H) 11/20/2018    CR 1.26 (H) 11/20/2018    " GFRESTIMATED 60 (L) 2018    GFRESTBLACK 73 2018    ROB 9.7 2018    BILITOTAL 1.4 (H) 10/01/2018    ALBUMIN 4.2 10/01/2018    ALKPHOS 67 10/01/2018    ALT 34 10/01/2018    AST 21 10/01/2018        INR RESULTS:  Lab Results   Component Value Date    INR 2.3 (A) 2017    INR 2.36 (H) 2017       No components found for: CK  Lab Results   Component Value Date    MAG 2.4 (H) 2017     Lab Results   Component Value Date    NTBNP 1599 (H) 2017     @BRIEFLABR (dig)@    Most recent echocardiogram:  Recent Results (from the past 8760 hour(s))   Echocardiogram Complete    Narrative    [id number]  [id number]  [id number]  [id number]^SHANTELL^SYDNEE^           [facility]  [facility]3rd [facility]  [address] [place] [address]     Name: SYDNIE SIERRA  MRN: [id number]  : 1965  Study Date: 2018 07:55 AM  Age: 52 yrs  Gender: Male  [place] [facility] [place] For Study: 1 year s/p MitraClip  Ordering Physician: SYDNEE STINSON  Referring Physician: SYDNEE STINSON  Performed By: RAE Agarwal     BSA: 1.6 m2  Height: 62 in  Weight: 126 lb  BP: 130/91 mmHg  _____________________________________________________________________________  __        Procedure  Echocardiogram with two-dimensional, color and spectral Doppler performed.  _____________________________________________________________________________  __        Interpretation Summary  The Ejection Fraction is estimated at 30-35%. Left ventricular size is normal.  Global right ventricular function is mildly reduced. The right ventricle is  normal size.  s/p MitralClip with MV mPG of 4mmHg at 90 bpm. Trace mitral insufficiency is  present.  Moderate tricuspid insufficiency is present.  This study was compared with the study from 12/15/17 .  There has been no change.  _____________________________________________________________________________  __         Left Ventricle  Left ventricular size is normal. Left ventricular wall thickness is normal.  The Ejection Fraction is estimated at 30-35%. Grade III or advanced diastolic  dysfunction. Akinesis of the basal to apical inferior, basal infero-lateral  and infero-septal segments.     Right Ventricle  The right ventricle is normal size. Global right ventricular function is  mildly reduced. A pacemaker lead is noted in the right ventricle.     Atria  Both atria appear normal. The atrial septum is intact as assessed by color  Doppler . A pacemaker lead is noted in the right atrium.     Mitral Valve  Trace mitral insufficiency is present. s/p MitralClip with MV mPG of 4mmHg at  90 bpm.        Aortic Valve  Aortic valve is normal in structure and function. The aortic valve is  tricuspid.     Tricuspid Valve  Moderate tricuspid insufficiency is present. The right ventricular systolic  pressure is approximated at 41.7 mmHg plus the right atrial pressure. Etiology  of TR is leaflet restriction by ICD lead.     Pulmonic Valve  The pulmonic valve is normal. Trace pulmonic insufficiency is present.     Vessels  The aorta root is normal. The pulmonary artery is normal. The inferior vena  cava was normal in size with preserved respiratory variability.     Pericardium  No pericardial effusion is present.        Compared to Previous Study  This study was compared with the study from 12/15/17 . There has been no  change.     Attestation  I have personally viewed the imaging and agree with the interpretation and  report as documented by the fellow, Kirby Rodriguez, and/or edited by me.  _____________________________________________________________________________  __     MMode/2D Measurements & Calculations  IVSd: 0.84 cm  LVIDd: 5.0 cm  LVIDs: 4.5 cm  LVPWd: 0.89 cm  FS: 9.6 %  LV mass(C)d: 149.1 grams  LV mass(C)dI: 94.9 grams/m2  Ao root diam: 2.6 cm  asc Aorta Diam: 3.0 cm  LVOT diam: 2.0 cm  LVOT area: 3.1 cm2     EF(MOD-bp): 26.7  %  LA Volume (BP): 42.8 ml     LA Volume Index (BP): 27.3 ml/m2  RWT: 0.36        Doppler Measurements & Calculations  MV E max frank: 129.0 cm/sec  MV A max frank: 62.4 cm/sec  MV E/A: 2.1  MV max PG: 10.9 mmHg  MV mean PG: 3.6 mmHg  MV V2 VTI: 26.1 cm  MVA(VTI): 1.4 cm2  MV dec time: 0.10 sec  Ao V2 max: 97.9 cm/sec  Ao max P.0 mmHg  Ao V2 mean: 71.0 cm/sec  Ao mean P.3 mmHg  Ao V2 VTI: 18.3 cm  MICHELLE(I,D): 2.0 cm2  MICHELLE(V,D): 2.2 cm2  LV V1 max P.9 mmHg  LV V1 max: 68.9 cm/sec  LV V1 VTI: 12.0 cm  SV(LVOT): 37.2 ml  SI(LVOT): 23.7 ml/m2  PA V2 max: 65.2 cm/sec  PA max P.7 mmHg  PA acc time: 0.13 sec  PI end-d frank: 160.2 cm/sec  TR max frank: 322.8 cm/sec  TR max P.7 mmHg  AV Frank Ratio (DI): 0.70     MICHELLE Index (cm2/m2): 1.3  E/E' av.1  Lateral E/e': 32.3  Medial E/e': 35.8     _____________________________________________________________________________  __           Report approved by: Isabel Love 2018 09:49 AM      Echocardiogram Limited    Narrative    [id number]  ECH11  [id number]  [id number]^MEGHAN^IVA^AMBER           [facility]  [facility]-3rd Floor  [address] [place] [address]     Name: RIGO SYDNIE WERNER  MRN: [id number]  : 1965  Study Date: 12/15/2017 10:52 AM  Age: 52 yrs  Gender: Male  [place] [facility] [place] For Study: s/p Karena Ahuja  Ordering Physician: IVA CHRISTIANSON  Referring Physician: IVA CHRISTIANSON  Performed By: Sandra Fields DARRIAN     BSA: 1.6 m2  Height: 62 in  Weight: 123 lb  BP: 100/74 mmHg  _____________________________________________________________________________  __        Procedure  Limited Echocardiogram with portions of two-dimensional, color and spectral  Doppler performed.  _____________________________________________________________________________  __        Interpretation Summary  Moderately reduced systolic function. EF 30-35%. Global hypokinesis with  akinesis  of the basal-mid inferior and basal-mid inferolateral walls.  Global right ventricular function is mildly reduced.  Status post transcatheter mitral valve repair with Mitraclip. The clip is  attached to the leaflets. There is trace to mild mitral regurgitation. Mean  gradient 5 mmHg at heart rate 97 bpm.  Pulmonary hypertension with right ventricular systolic pressure 41mmHg above  the right atrial pressure present.  No pericardial effusion.     Compared to the prior study 8/10/17 there has been no significant change.  _____________________________________________________________________________  __        Left Ventricle  Left ventricular wall thickness is normal. Mild left ventricular dilation is  present. Global hypokinesis with akinesis of the basal-mid inferior and basal-  mid inferolateral walls. Moderately reduced systolic function. EF 30-35%.  Diastolic function not assessed.     Right Ventricle  The right ventricle is normal size. Global right ventricular function is  mildly reduced. A pacemaker lead is noted in the right ventricle.     Atria  Both atria appear normal.     Mitral Valve  Status post transcatheter mitral valve repair with Mitraclip. The clip is  attached to the leaflets. There is trace to mild mitral regurgitation. Mean  gradient 5 mmHg at heart rate 97 bpm.        Aortic Valve  Aortic valve is normal in structure and function.     Tricuspid Valve  Mild tricuspid insufficiency is present. Right ventricular systolic pressure  is 41mmHg above the right atrial pressure.     Pulmonic Valve  The pulmonic valve is normal.     Vessels  The aorta root is normal. The inferior vena cava was normal in size with  preserved respiratory variability. Estimated mean right atrial pressure is 3  mmHg.     Pericardium  No pericardial effusion is present.        Attestation  I have personally viewed the imaging and agree with the interpretation and  report as documented by the fellow, David Hill, and/or  edited by me.  _____________________________________________________________________________  __  MMode/2D Measurements & Calculations     IVSd: 0.72 cm  LVIDd: 4.9 cm  LVIDs: 4.2 cm  LVPWd: 0.73 cm  FS: 14.2 %  EDV(Teich): 115.5 ml  ESV(Teich): 80.6 ml  LV mass(C)d: 118.2 grams  LV mass(C)dI: 76.1 grams/m2     EF(MOD-bp): 34.1 %  LA Volume (BP): 49.5 ml  LA Volume Index (BP): 31.9 ml/m2  RWT: 0.30           Doppler Measurements & Calculations  MV E max isabel: 126.4 cm/sec  MV A max isabel: 46.5 cm/sec  MV E/A: 2.7  MV max P.7 mmHg  MV mean P.6 mmHg  MV V2 VTI: 26.5 cm  MV dec time: 0.13 sec  TR max isabel: 321.2 cm/sec  TR max P.3 mmHg     _____________________________________________________________________________  __           Report approved by: Isabel Alex 12/15/2017 12:24 PM            Assessment and Plan:    Luke is a 53 year old gentleman with a past medical history of ICM who appears hypervolemic.  I increased his Bumex to 1 mg daily until his weight drops three lbs, then he will resume 0.5 mg daily.  He will also increase his Entresto to 97/103 mg twice daily.  He will repeat lab work in a week to re evaluate his electrolytes.  He will return to [facility] in one month.  I instructed him to bring in his BP cuff at that time to check for accuracy.     1.  Acute on chronic systolic heart failure secondary to ischemic cardiomyopathy.  Stage D NYHA Class II  ACEi/ARB: Entresto 49/51 mg twice daily.  BB: yes, Toprol  mg daily.   Aldosterone antagonist: Deferred due to worsening renal function with previous attempts.   SCD prophylaxis: ICD  Fluid status: Hypervolemic. Diurese as outlined above.    Anticoagulation: None.   Antiplatelet:  ASA dose 81 mg daily.   Sleep apnea:  Not documented.  NSAID use:  Contraindicated.  Avoid use.  Remote Monitoring: None.   RV support:  Continue Digoxin.     2.  CAD:  Stable.  Continue Plavix, ASA, Metoprolol, Lisinopril, and Atorvastatin        3.  Mitral  valve repair s/p mitral clip:  Follow up with Dr. Amaral as scheduled in April 2019.       4. HTN: 143/90, recheck 144/99.  Increase Entresto as outlined above.  Continue Toprol  mg daily.      5. Follow up:  BMP in one week, [facility] in one month. Dr. Amaral as scheduled in April.       Vandana VENEGAS, CNP  [facility]

## 2018-12-04 NOTE — LETTER
12/4/2018      RE: Luke Henao  [address]  [facility] [address]       Dear Colleague,    Thank you for the opportunity to participate in the care of your patient, Luke Henao, at the [facility]. Please see a copy of my visit note below.      HPI: Luke is a 53 year old gentleman with a past medical history of ICM, CAD with a RCA STEMI s/p PCI x 7 to RCA, left circumflex (now ), and LAD on 3/27/17 complicated by PEDRO, sepsis, sacral ulcer, DVT, bilateral hemispheric infarcts secondary to watershed ischemia, and cardiogenic shock s/p IABP, who was later transitioned to a subclavian balloon pump and underwent mitral clip for ischemic MR.   He returns to clinic for routine follow up.    Luke is feeling more fatigued, but has been ill with a cold.  He notes more congestion in his face in the am upon waking, +PND a few times this week.  Denies RAYMOND, palpitations, lightheadedness, lower extremity edema, early satiety, or chest pain.      Home weight has been between 125-127 lbs.  BP at home has been lower between 119-109/79-83.    He has been drinking 36 ounces of fluid daily.  Admits he ate more sodium than normal.         PAST MEDICAL HISTORY:  Past Medical History:   Diagnosis Date     Acne      CAD (coronary artery disease) 2017    RCA STEMI s/p balloon angioplasty and multiple Sofie to RCA, LCx, and LAD     Cardiogenic shock (H)      DVT (deep venous thrombosis) (H) 2017    left internal jugular (line-associated); left common femoral; on coumadin     Ischemic cardiomyopathy 2017    EF 30-35%     Ischemic stroke (H) 2017    no residual deficits     Lower GI bleed 2017    due to rectal ulcer     Mitral valve insufficiency, ischemic 2017    s/p Mitral Clip placement      Skin ulcer of sacrum (H)      Systolic heart failure (H)      Type 2 diabetes mellitus (H)        FAMILY HISTORY:  Family History   Problem Relation Age of Onset     Hypertension  Mother      Type 2 Diabetes Father      Hypertension Father      Myocardial Infarction No family hx of      Cerebrovascular Disease No family hx of      Coronary Artery Disease Early Onset No family hx of      Colon Cancer No family hx of      Prostate Cancer No family hx of        SOCIAL HISTORY:  Social History     Social History     Marital status:      Spouse name: N/A     Number of children: N/A     Years of education: N/A     Occupational History     [employer]      Social History Main Topics     Smoking status: Former Smoker     Packs/day: 0.50     Years: 30.00     Types: Cigarettes     Quit date: 1/1/2017     Smokeless tobacco: Never Used     Alcohol use No     Drug use: No     Sexual activity: Not Currently     Other Topics Concern     None     Social History Narrative    . Remarried.    4 children with first marriage.    2 grand children.    Walks daily, but no formal exercise.            CURRENT MEDICATIONS:  Current Outpatient Prescriptions   Medication Sig Dispense Refill     aspirin 81 MG chewable tablet Take 1 tablet (81 mg) by mouth daily 90 tablet 3     atorvastatin (LIPITOR) 40 MG tablet Take 1 tablet (40 mg) by mouth daily 60 tablet 5     bumetanide (BUMEX) 0.5 MG tablet Take 1 tablet (0.5 mg) by mouth daily 45 tablet 3     cetirizine (ZYRTEC) 10 MG tablet TAKE 1 TABLET (10 MG) BY MOUTH DAILY 60 tablet 10     digoxin (LANOXIN) 125 MCG tablet Take one tablet on M, W, F, Sunday. 60 tablet 5     fluticasone (FLONASE) 50 MCG/ACT spray Spray 2 sprays into both nostrils daily 3 Bottle 3     glipiZIDE (GLUCOTROL) 5 MG tablet Take 1 tablet (5 mg) by mouth 2 times daily (before meals) 180 tablet 3     insulin pen needle (BD KYLEE U/F) 32G X 4 MM Use 3 daily or as directed. (Patient not taking: Reported on 11/6/2018) 100 each prn     metFORMIN (GLUCOPHAGE-XR) 500 MG 24 hr tablet Take 1,000 mg by mouth daily (with dinner)  180 tablet 3     metoprolol succinate (TOPROL-XL) 50 MG 24 hr  "tablet Take 3 tablets (150 mg) by mouth daily 180 tablet 3     ONETOUCH ULTRA test strip USE TO TEST BLOOD SUGAR 3 TIMES DAILY OR AS DIRECTED. 100 strip 3     sacubitril-valsartan (ENTRESTO) 49-51 MG per tablet Take 1 tablet by mouth 2 times daily 60 tablet 1       ROS:  Constitutional: Denies fever, chills, or diaphoresis.   No significant weight fluctuations.   ENT: See HPI.    Respiratory:  See HPI  Cardiovascular: As per HPI.   GI: Denies nausea, vomiting, diarrhea, hematemesis, melena, or hematochezia.   : Denies urinary frequency, dysuria, or hematuria.   Integument: Negative for bruising, rash, or pruritis.  Psychiatric: Negative for anxiety, depression, sleep disturbance, or mood changes.   Neuro: Negative for headaches, syncope, numbness or tingling.   Endocrinology: Negative for thyroid disorder, night sweats, diabetes.   Musculoskeletal: Negative for muscle weakness or gout.     EXAM:  BP (!) 144/99 (BP Location: Left arm, Patient Position: Chair, Cuff Size: Adult Regular)  Pulse 80  Ht 1.575 m (5' 2\")  Wt 58.7 kg (129 lb 4.8 oz)  SpO2 98%  BMI 23.65 kg/m2  General: alert, articulate, and in no acute distress.  HEENT: normocephalic, atraumatic, anicteric sclera, EOMI, mucosa moist, no cyanosis.   Neck: neck supple. JVP 11 cm.   Heart: regular rhythm, normal S1/S2, no murmur, gallop, rub.  Precordium quiet with normal PMI.     Lungs: clear, no rales, ronchi, or wheezing.  No accessory muscle use, respirations unlabored.   Abdomen: Soft, non-tender, bowel sounds present, no hepatomegaly  Extremities: Trace pitting edema.   No cyanosis.  Extremities warm.  2+ pedal pulses.   Neurological: Alert and oriented x 3.  Normal speech and affect, no gross motor deficits  Skin:  No ecchymoses or rashes.     Labs:  CBC RESULTS:  Lab Results   Component Value Date    WBC 10.7 10/01/2018    RBC 4.97 10/01/2018    HGB 14.9 10/01/2018    HCT 46.3 10/01/2018    MCV 93 10/01/2018    MCH 30.0 10/01/2018    MCHC 32.2 " 10/01/2018    RDW 14.6 10/01/2018     10/01/2018       CMP RESULTS:  Lab Results   Component Value Date     2018    POTASSIUM 4.4 2018    CHLORIDE 102 2018    CO2 29 2018    ANIONGAP 8 2018     (H) 2018    BUN 32 (H) 2018    CR 1.26 (H) 2018    GFRESTIMATED 60 (L) 2018    GFRESTBLACK 73 2018    ROB 9.7 2018    BILITOTAL 1.4 (H) 10/01/2018    ALBUMIN 4.2 10/01/2018    ALKPHOS 67 10/01/2018    ALT 34 10/01/2018    AST 21 10/01/2018        INR RESULTS:  Lab Results   Component Value Date    INR 2.3 (A) 2017    INR 2.36 (H) 2017       No components found for: CK  Lab Results   Component Value Date    MAG 2.4 (H) 2017     Lab Results   Component Value Date    NTBNP 1599 (H) 2017     @BRIEFLABR (dig)@    Most recent echocardiogram:  Recent Results (from the past 8760 hour(s))   Echocardiogram Complete    Narrative    [id number]  [id number]  [id number]  [id number]^SHANTELL^SYDNEE^           [facility]  [facility]  [address] [place] [address]     Name: SYDNIE SIERRA  MRN: [id number]  : 1965  Study Date: 2018 07:55 AM  Age: 52 yrs  Gender: Male  Patient Location: [facility] [place]  Reason For Study: 1 year s/p MitraClip  Ordering Physician: SYDNEE STINSON  Referring Physician: SYDNEE STINSON  Performed By: RAE Agarwal     BSA: 1.6 m2  Height: 62 in  Weight: 126 lb  BP: 130/91 mmHg  _____________________________________________________________________________  __        Procedure  Echocardiogram with two-dimensional, color and spectral Doppler performed.  _____________________________________________________________________________  __        Interpretation Summary  The Ejection Fraction is estimated at 30-35%. Left ventricular size is normal.  Global right ventricular function is mildly reduced. The right ventricle is  normal  size.  s/p MitralClip with MV mPG of 4mmHg at 90 bpm. Trace mitral insufficiency is  present.  Moderate tricuspid insufficiency is present.  This study was compared with the study from 12/15/17 .  There has been no change.  _____________________________________________________________________________  __        Left Ventricle  Left ventricular size is normal. Left ventricular wall thickness is normal.  The Ejection Fraction is estimated at 30-35%. Grade III or advanced diastolic  dysfunction. Akinesis of the basal to apical inferior, basal infero-lateral  and infero-septal segments.     Right Ventricle  The right ventricle is normal size. Global right ventricular function is  mildly reduced. A pacemaker lead is noted in the right ventricle.     Atria  Both atria appear normal. The atrial septum is intact as assessed by color  Doppler . A pacemaker lead is noted in the right atrium.     Mitral Valve  Trace mitral insufficiency is present. s/p MitralClip with MV mPG of 4mmHg at  90 bpm.        Aortic Valve  Aortic valve is normal in structure and function. The aortic valve is  tricuspid.     Tricuspid Valve  Moderate tricuspid insufficiency is present. The right ventricular systolic  pressure is approximated at 41.7 mmHg plus the right atrial pressure. Etiology  of TR is leaflet restriction by ICD lead.     Pulmonic Valve  The pulmonic valve is normal. Trace pulmonic insufficiency is present.     Vessels  The aorta root is normal. The pulmonary artery is normal. The inferior vena  cava was normal in size with preserved respiratory variability.     Pericardium  No pericardial effusion is present.        Compared to Previous Study  This study was compared with the study from 12/15/17 . There has been no  change.     Attestation  I have personally viewed the imaging and agree with the interpretation and  report as documented by the fellow, Kirby Rodriguez, and/or edited by  me.  _____________________________________________________________________________  __     MMode/2D Measurements & Calculations  IVSd: 0.84 cm  LVIDd: 5.0 cm  LVIDs: 4.5 cm  LVPWd: 0.89 cm  FS: 9.6 %  LV mass(C)d: 149.1 grams  LV mass(C)dI: 94.9 grams/m2  Ao root diam: 2.6 cm  asc Aorta Diam: 3.0 cm  LVOT diam: 2.0 cm  LVOT area: 3.1 cm2     EF(MOD-bp): 26.7 %  LA Volume (BP): 42.8 ml     LA Volume Index (BP): 27.3 ml/m2  RWT: 0.36        Doppler Measurements & Calculations  MV E max frank: 129.0 cm/sec  MV A max frank: 62.4 cm/sec  MV E/A: 2.1  MV max PG: 10.9 mmHg  MV mean PG: 3.6 mmHg  MV V2 VTI: 26.1 cm  MVA(VTI): 1.4 cm2  MV dec time: 0.10 sec  Ao V2 max: 97.9 cm/sec  Ao max P.0 mmHg  Ao V2 mean: 71.0 cm/sec  Ao mean P.3 mmHg  Ao V2 VTI: 18.3 cm  MICHELLE(I,D): 2.0 cm2  MICHELLE(V,D): 2.2 cm2  LV V1 max P.9 mmHg  LV V1 max: 68.9 cm/sec  LV V1 VTI: 12.0 cm  SV(LVOT): 37.2 ml  SI(LVOT): 23.7 ml/m2  PA V2 max: 65.2 cm/sec  PA max P.7 mmHg  PA acc time: 0.13 sec  PI end-d frank: 160.2 cm/sec  TR max frank: 322.8 cm/sec  TR max P.7 mmHg  AV Frank Ratio (DI): 0.70     MICHELLE Index (cm2/m2): 1.3  E/E' av.1  Lateral E/e': 32.3  Medial E/e': 35.8     _____________________________________________________________________________  __           Report approved by: Isabel Love 2018 09:49 AM      Echocardiogram Limited    Narrative    [id number]  [facility]  [id number]  [id number]^MEGHAN^IVA^AMBER           [facility]  Diagnostic and [facility]-3rd Floor  [address] [place] [id number]     Name: SYDNIE SIERRA  MRN: [id number]  : 1965  Study Date: 12/15/2017 10:52 AM  Age: 52 yrs  Gender: Male  [place] [facility] [place]  Reason For Study: s/p Karena Clip  Ordering Physician: IVA CHRISTIANSON  Referring Physician: IVA CHRISTIANSON  Performed By: Sandra Fields RDCS     BSA: 1.6 m2  Height: 62 in  Weight: 123 lb  BP: 100/74  mmHg  _____________________________________________________________________________  __        Procedure  Limited Echocardiogram with portions of two-dimensional, color and spectral  Doppler performed.  _____________________________________________________________________________  __        Interpretation Summary  Moderately reduced systolic function. EF 30-35%. Global hypokinesis with  akinesis of the basal-mid inferior and basal-mid inferolateral walls.  Global right ventricular function is mildly reduced.  Status post transcatheter mitral valve repair with Mitraclip. The clip is  attached to the leaflets. There is trace to mild mitral regurgitation. Mean  gradient 5 mmHg at heart rate 97 bpm.  Pulmonary hypertension with right ventricular systolic pressure 41mmHg above  the right atrial pressure present.  No pericardial effusion.     Compared to the prior study 8/10/17 there has been no significant change.  _____________________________________________________________________________  __        Left Ventricle  Left ventricular wall thickness is normal. Mild left ventricular dilation is  present. Global hypokinesis with akinesis of the basal-mid inferior and basal-  mid inferolateral walls. Moderately reduced systolic function. EF 30-35%.  Diastolic function not assessed.     Right Ventricle  The right ventricle is normal size. Global right ventricular function is  mildly reduced. A pacemaker lead is noted in the right ventricle.     Atria  Both atria appear normal.     Mitral Valve  Status post transcatheter mitral valve repair with Mitraclip. The clip is  attached to the leaflets. There is trace to mild mitral regurgitation. Mean  gradient 5 mmHg at heart rate 97 bpm.        Aortic Valve  Aortic valve is normal in structure and function.     Tricuspid Valve  Mild tricuspid insufficiency is present. Right ventricular systolic pressure  is 41mmHg above the right atrial pressure.     Pulmonic Valve  The pulmonic  valve is normal.     Vessels  The aorta root is normal. The inferior vena cava was normal in size with  preserved respiratory variability. Estimated mean right atrial pressure is 3  mmHg.     Pericardium  No pericardial effusion is present.        Attestation  I have personally viewed the imaging and agree with the interpretation and  report as documented by the fellow, David Hill, and/or edited by me.  _____________________________________________________________________________  __  MMode/2D Measurements & Calculations     IVSd: 0.72 cm  LVIDd: 4.9 cm  LVIDs: 4.2 cm  LVPWd: 0.73 cm  FS: 14.2 %  EDV(Teich): 115.5 ml  ESV(Teich): 80.6 ml  LV mass(C)d: 118.2 grams  LV mass(C)dI: 76.1 grams/m2     EF(MOD-bp): 34.1 %  LA Volume (BP): 49.5 ml  LA Volume Index (BP): 31.9 ml/m2  RWT: 0.30           Doppler Measurements & Calculations  MV E max isabel: 126.4 cm/sec  MV A max isabel: 46.5 cm/sec  MV E/A: 2.7  MV max P.7 mmHg  MV mean P.6 mmHg  MV V2 VTI: 26.5 cm  MV dec time: 0.13 sec  TR max isabel: 321.2 cm/sec  TR max P.3 mmHg     _____________________________________________________________________________  __           Report approved by: Isabel Alex 12/15/2017 12:24 PM            Assessment and Plan:    Luke is a 53 year old gentleman with a past medical history of ICM who appears hypervolemic.  I increased his Bumex to 1 mg daily until his weight drops three lbs, then he will resume 0.5 mg daily.  He will also increase his Entresto to 97/103 mg twice daily.  He will repeat lab work in a week to re evaluate his electrolytes.  He will return to [facility] clinic in one month.  I instructed him to bring in his BP cuff at that time to check for accuracy.     1.  Acute on chronic systolic heart failure secondary to ischemic cardiomyopathy.  Stage D NYHA Class II  ACEi/ARB: Entresto 49/51 mg twice daily.  BB: yes, Toprol  mg daily.   Aldosterone antagonist: Deferred due to worsening renal function with  previous attempts.   SCD prophylaxis: ICD  Fluid status: Hypervolemic. Diurese as outlined above.    Anticoagulation: None.   Antiplatelet:  ASA dose 81 mg daily.   Sleep apnea:  Not documented.  NSAID use:  Contraindicated.  Avoid use.  Remote Monitoring: None.   RV support:  Continue Digoxin.     2.  CAD:  Stable.  Continue Plavix, ASA, Metoprolol, Lisinopril, and Atorvastatin        3.  Mitral valve repair s/p mitral clip:  Follow up with Dr. Amaral as scheduled in April 2019.       4. HTN: 143/90, recheck 144/99.  Increase Entresto as outlined above.  Continue Toprol  mg daily.      5. Follow up:  BMP in one week, [facility] clinic in one month. Dr. Amaral as scheduled in April.       Vandana VENEGAS, CNP  [facility]

## 2018-12-04 NOTE — NURSING NOTE
Labs: Patient was given results of the laboratory testing obtained today. Patient was instructed to return for the next laboratory testing in 1 week. Patient demonstrated understanding of this information and agreed to call with further questions or concerns.   Return Appointment: Patient given instructions regarding scheduling next clinic visit. Patient demonstrated understanding of this information and agreed to call with further questions or concerns.  Medication Change: Patient was educated regarding prescribed medication change, including discussion of the indication, administration, side effects, and when to report to MD or RN. Patient demonstrated understanding of this information and agreed to call with further questions or concerns.  Patient stated he understood all health information given and agreed to call with further questions or concerns.   Soraya Messina RN

## 2018-12-06 DIAGNOSIS — E11.9 TYPE 2 DIABETES MELLITUS WITHOUT COMPLICATION, WITHOUT LONG-TERM CURRENT USE OF INSULIN (H): ICD-10-CM

## 2018-12-06 RX ORDER — GLIPIZIDE 5 MG/1
TABLET ORAL
Qty: 180 TABLET | Refills: 3 | Status: SHIPPED | OUTPATIENT
Start: 2018-12-06 | End: 2019-12-07

## 2018-12-06 NOTE — TELEPHONE ENCOUNTER
"Requested Prescriptions   Pending Prescriptions Disp Refills     glipiZIDE (GLUCOTROL) 5 MG tablet [Pharmacy Med Name: GLIPIZIDE 5 MG TABLET] 180 tablet 3    Last Written Prescription Date:  12/22/17  Last Fill Quantity: 180,  # refills: 3   Last office visit: 10/1/2018 with prescribing provider:  10/1/18   Future Office Visit:     Sig: TAKE 1 TABLET (5 MG) BY MOUTH 2 TIMES DAILY (BEFORE MEALS)    Sulfonylurea Agents Failed    12/6/2018  2:57 AM       Failed - Blood pressure less than 140/90 in past 6 months    BP Readings from Last 3 Encounters:   12/04/18 (!) 144/99   11/13/18 143/90   11/06/18 (!) 150/94                Failed - Patient has had a Microalbumin in the past 12 mos.    No lab results found.         Passed - Patient has documented LDL within the past 12 mos.    Recent Labs   Lab Test  10/22/18   0909   LDL  50            Passed - Patient has documented A1c within the specified period of time.    If HgbA1C is 8 or greater, it needs to be on file within the past 3 months.  If less than 8, must be on file within the past 6 months.     Recent Labs   Lab Test  10/01/18   1018   A1C  6.6*            Passed - Patient is age 18 or older       Passed - Patient has a recent creatinine (normal) within the past 12 mos.    Recent Labs   Lab Test  12/04/18   0913   03/26/17   1812   CR  1.03   < >   --    CREAT   --    --   2.4*    < > = values in this interval not displayed.            Passed - Recent (6 mo) or future (30 days) visit within the authorizing provider's specialty    Patient had office visit in the last 6 months or has a visit in the next 30 days with authorizing provider or within the authorizing provider's specialty.  See \"Patient Info\" tab in inbasket, or \"Choose Columns\" in Meds & Orders section of the refill encounter.              "

## 2018-12-11 DIAGNOSIS — I25.5 ISCHEMIC CARDIOMYOPATHY: ICD-10-CM

## 2018-12-11 LAB
ANION GAP SERPL CALCULATED.3IONS-SCNC: 8 MMOL/L (ref 3–14)
BUN SERPL-MCNC: 18 MG/DL (ref 7–30)
CALCIUM SERPL-MCNC: 8.7 MG/DL (ref 8.5–10.1)
CHLORIDE SERPL-SCNC: 104 MMOL/L (ref 94–109)
CO2 SERPL-SCNC: 28 MMOL/L (ref 20–32)
CREAT SERPL-MCNC: 1.12 MG/DL (ref 0.66–1.25)
GFR SERPL CREATININE-BSD FRML MDRD: 69 ML/MIN/1.7M2
GLUCOSE SERPL-MCNC: 122 MG/DL (ref 70–99)
POTASSIUM SERPL-SCNC: 4.3 MMOL/L (ref 3.4–5.3)
SODIUM SERPL-SCNC: 140 MMOL/L (ref 133–144)

## 2018-12-11 PROCEDURE — 80048 BASIC METABOLIC PNL TOTAL CA: CPT | Performed by: NURSE PRACTITIONER

## 2018-12-11 PROCEDURE — 36415 COLL VENOUS BLD VENIPUNCTURE: CPT | Performed by: NURSE PRACTITIONER

## 2018-12-19 DIAGNOSIS — I50.22 CHRONIC SYSTOLIC CONGESTIVE HEART FAILURE (H): ICD-10-CM

## 2018-12-19 RX ORDER — METOPROLOL SUCCINATE 50 MG/1
150 TABLET, EXTENDED RELEASE ORAL DAILY
Qty: 180 TABLET | Refills: 3 | Status: SHIPPED | OUTPATIENT
Start: 2018-12-19 | End: 2019-05-01

## 2019-01-07 DIAGNOSIS — I25.5 ISCHEMIC CARDIOMYOPATHY: ICD-10-CM

## 2019-01-10 DIAGNOSIS — I25.5 ISCHEMIC CARDIOMYOPATHY: Primary | ICD-10-CM

## 2019-01-10 NOTE — CONSULTS
BRIEF NUTRITION NOTE:    Received routine [facility] Nutrition Consult for diet education s/p angiogram procedure.  Diet education not appropriate at this time as pt is intubated, critically ill.  Will monitor for nutrition plan of care.    Gerda Jimenez RD, LD, CNSC   Skin Substitute Injection Text: The defect edges were debeveled with a #15 scalpel blade.  Given the location of the defect, shape of the defect and the proximity to free margins a skin substitute micronized graft was deemed most appropriate.  The entire vial contents were admixed with 3.0ccs of sterile saline and then injected subcutaneously throughout the entire wound bed.

## 2019-01-15 ENCOUNTER — OFFICE VISIT (OUTPATIENT)
Dept: CARDIOLOGY | Facility: CLINIC | Age: 54
End: 2019-01-15
Attending: NURSE PRACTITIONER

## 2019-01-15 ENCOUNTER — TELEPHONE (OUTPATIENT)
Dept: CARDIOLOGY | Facility: CLINIC | Age: 54
End: 2019-01-15

## 2019-01-15 VITALS
HEIGHT: 62 IN | SYSTOLIC BLOOD PRESSURE: 112 MMHG | WEIGHT: 129 LBS | DIASTOLIC BLOOD PRESSURE: 75 MMHG | OXYGEN SATURATION: 99 % | HEART RATE: 75 BPM | BODY MASS INDEX: 23.74 KG/M2

## 2019-01-15 DIAGNOSIS — I10 BENIGN ESSENTIAL HYPERTENSION: ICD-10-CM

## 2019-01-15 DIAGNOSIS — I25.10 CORONARY ARTERY DISEASE INVOLVING NATIVE HEART WITHOUT ANGINA PECTORIS, UNSPECIFIED VESSEL OR LESION TYPE: ICD-10-CM

## 2019-01-15 DIAGNOSIS — I25.5 ISCHEMIC CARDIOMYOPATHY: ICD-10-CM

## 2019-01-15 DIAGNOSIS — Z95.818 S/P MITRAL VALVE CLIP IMPLANTATION: ICD-10-CM

## 2019-01-15 DIAGNOSIS — M62.89 MUSCLE TIGHTNESS: ICD-10-CM

## 2019-01-15 DIAGNOSIS — Z98.890 S/P MITRAL VALVE CLIP IMPLANTATION: ICD-10-CM

## 2019-01-15 DIAGNOSIS — I50.22 CHRONIC SYSTOLIC CONGESTIVE HEART FAILURE (H): Primary | ICD-10-CM

## 2019-01-15 LAB
ANION GAP SERPL CALCULATED.3IONS-SCNC: 7 MMOL/L (ref 3–14)
BUN SERPL-MCNC: 32 MG/DL (ref 7–30)
CALCIUM SERPL-MCNC: 8.8 MG/DL (ref 8.5–10.1)
CHLORIDE SERPL-SCNC: 98 MMOL/L (ref 94–109)
CO2 SERPL-SCNC: 28 MMOL/L (ref 20–32)
CREAT SERPL-MCNC: 1.19 MG/DL (ref 0.66–1.25)
GFR SERPL CREATININE-BSD FRML MDRD: 70 ML/MIN/{1.73_M2}
GLUCOSE SERPL-MCNC: 198 MG/DL (ref 70–99)
POTASSIUM SERPL-SCNC: 4.5 MMOL/L (ref 3.4–5.3)
SODIUM SERPL-SCNC: 133 MMOL/L (ref 133–144)

## 2019-01-15 PROCEDURE — 80048 BASIC METABOLIC PNL TOTAL CA: CPT | Performed by: NURSE PRACTITIONER

## 2019-01-15 PROCEDURE — T1013 SIGN LANG/ORAL INTERPRETER: HCPCS | Mod: U3,ZF

## 2019-01-15 PROCEDURE — 36415 COLL VENOUS BLD VENIPUNCTURE: CPT | Performed by: NURSE PRACTITIONER

## 2019-01-15 PROCEDURE — 99214 OFFICE O/P EST MOD 30 MIN: CPT | Mod: ZP | Performed by: NURSE PRACTITIONER

## 2019-01-15 PROCEDURE — G0463 HOSPITAL OUTPT CLINIC VISIT: HCPCS | Mod: ZF

## 2019-01-15 ASSESSMENT — PAIN SCALES - GENERAL: PAINLEVEL: NO PAIN (0)

## 2019-01-15 ASSESSMENT — MIFFLIN-ST. JEOR: SCORE: 1319.39

## 2019-01-15 NOTE — NURSING NOTE
Chief Complaint   Patient presents with     Follow Up     Return [facility]; 53yr old male with a h/o chronic systolic heart failure secondary to ICM presenting for HF follow-up with labs prior.     Medications reviewed and vitals performed.  Danelle Busch CMA

## 2019-01-15 NOTE — TELEPHONE ENCOUNTER
[facility] Call Center    Phone Message    May a detailed message be left on voicemail: yes    Reason for Call: Other: Pharmacy calling stating that they got rx for Entresto; however, it says 97-103mg and it should be 49-51mg. Please resend.     Action Taken: Message routed to:  [facility]

## 2019-01-15 NOTE — LETTER
1/15/2019      RE: Luke Henao  [address]  [facility] [address]       Dear Colleague,    Thank you for the opportunity to participate in the care of your patient, Luke Henao, at the [facility]. Please see a copy of my visit note below.      HPI: Luke is a 53 year old gentleman with a past medical history of ICM, CAD with a RCA STEMI s/p PCI x 7 to RCA, left circumflex (now ), and LAD on 3/27/17 complicated by PEDRO, sepsis, sacral ulcer, DVT, bilateral hemispheric infarcts secondary to watershed ischemia, and cardiogenic shock s/p IABP and  subclavian balloon pump, who underwent mitral clip for ischemic MR.   He returns to clinic for routine follow up.    Luke is feeling well.  He denies dyspnea on exertion, shortness of breath at rest, PND, orthopnea, or lower extremity edema.  He has been following his sodium and fluid restrictions.  He is able to work on his feet standing for 10 hours without difficulties.  Since starting the Entresto, he feels that his heart failure is better controlled and that he has more energy.    His main concern today is that his blood pressures have been reading lower at home.  His blood pressures over the past couple of weeks have been reading in the 80s/60's range.  While he denies lightheadedness, he is concerned about the low blood pressure readings and would like to reduce his Entresto dose.    PAST MEDICAL HISTORY:  Past Medical History:   Diagnosis Date     Acne      CAD (coronary artery disease) 2017    RCA STEMI s/p balloon angioplasty and multiple Sofie to RCA, LCx, and LAD     Cardiogenic shock (H)      DVT (deep venous thrombosis) (H) 2017    left internal jugular (line-associated); left common femoral; on coumadin     Ischemic cardiomyopathy 2017    EF 30-35%     Ischemic stroke (H) 2017    no residual deficits     Lower GI bleed 2017    due to rectal ulcer     Mitral valve insufficiency, ischemic 2017    s/p  Mitral Clip placement      Skin ulcer of sacrum (H)      Systolic heart failure (H)      Type 2 diabetes mellitus (H)        FAMILY HISTORY:  Family History   Problem Relation Age of Onset     Hypertension Mother      Diabetes Type 2  Father      Hypertension Father      Myocardial Infarction No family hx of      Cerebrovascular Disease No family hx of      Coronary Artery Disease Early Onset No family hx of      Colon Cancer No family hx of      Prostate Cancer No family hx of        SOCIAL HISTORY:  Social History     Social History     Marital status:      Spouse name: N/A     Number of children: N/A     Years of education: N/A     Occupational History     [employer]      Social History Main Topics     Smoking status: Former Smoker     Packs/day: 0.50     Years: 30.00     Types: Cigarettes     Quit date: 1/1/2017     Smokeless tobacco: Never Used     Alcohol use No     Drug use: No     Sexual activity: Not Currently     Other Topics Concern     None     Social History Narrative    . Remarried.    4 children with first marriage.    2 grand children.    Walks daily, but no formal exercise.            CURRENT MEDICATIONS:  Current Outpatient Medications   Medication Sig Dispense Refill     aspirin 81 MG chewable tablet Take 1 tablet (81 mg) by mouth daily 90 tablet 3     atorvastatin (LIPITOR) 40 MG tablet Take 1 tablet (40 mg) by mouth daily 60 tablet 5     bumetanide (BUMEX) 0.5 MG tablet Take 2 tabs daily until weight down 3 lbs. Then reduce dose to 0.5 mg daily. 45 tablet 3     cetirizine (ZYRTEC) 10 MG tablet TAKE 1 TABLET (10 MG) BY MOUTH DAILY 60 tablet 10     digoxin (LANOXIN) 125 MCG tablet Take one tablet on M, W, F, Sunday. 60 tablet 5     fluticasone (FLONASE) 50 MCG/ACT spray Spray 2 sprays into both nostrils daily 3 Bottle 3     glipiZIDE (GLUCOTROL) 5 MG tablet TAKE 1 TABLET (5 MG) BY MOUTH 2 TIMES DAILY (BEFORE MEALS) 180 tablet 3     insulin pen needle (BD KYLEE U/F) 32G X 4  "MM Use 3 daily or as directed. 100 each prn     metFORMIN (GLUCOPHAGE-XR) 500 MG 24 hr tablet Take 1,000 mg by mouth daily (with dinner)  180 tablet 3     metoprolol succinate ER (TOPROL-XL) 50 MG 24 hr tablet Take 3 tablets (150 mg) by mouth daily 180 tablet 3     ONETOUCH ULTRA test strip USE TO TEST BLOOD SUGAR 3 TIMES DAILY OR AS DIRECTED. 100 strip 3     sacubitril-valsartan (ENTRESTO)  MG per tablet Take 1 tablet by mouth 2 times daily 60 tablet 3       ROS:  Constitutional: Denies fever, chills, or diaphoresis.   No significant weight fluctuations.   ENT: See HPI.    Respiratory:  See HPI  Cardiovascular: As per HPI.   GI: Denies nausea, vomiting, diarrhea, hematemesis, melena, or hematochezia.   : Denies urinary frequency, dysuria, or hematuria.   Integument: Negative for bruising, rash, or pruritis.  Psychiatric: Negative for anxiety, depression, sleep disturbance, or mood changes.   Neuro: Negative for headaches, syncope, numbness or tingling.   Endocrinology: Negative for thyroid disorder, night sweats, +diabetes.   Musculoskeletal: + Muscle tightness in his hamstrings.    EXAM:  /75 (BP Location: Left arm, Patient Position: Chair, Cuff Size: Adult Regular)   Pulse 75   Ht 1.575 m (5' 2\")   Wt 58.5 kg (129 lb)   SpO2 99%   BMI 23.59 kg/m     General: alert, articulate, and in no acute distress.  HEENT: normocephalic, atraumatic, anicteric sclera, EOMI, mucosa moist, no cyanosis.   Neck: neck supple. JVP 9 cm.  Heart: regular rhythm, normal S1/S2, no murmur, gallop, rub.  Precordium quiet with normal PMI.     Lungs: clear, no rales, ronchi, or wheezing.  No accessory muscle use, respirations unlabored.   Abdomen: Soft, non-tender, bowel sounds present, no hepatomegaly  Extremities: No pitting edema.   No cyanosis.  Extremities warm.  2+ pedal pulses.   Neurological: Alert and oriented x 3.  Normal speech and affect, no gross motor deficits  Skin:  No ecchymoses or rashes.     Labs:  CBC " RESULTS:  Lab Results   Component Value Date    WBC 10.7 10/01/2018    RBC 4.97 10/01/2018    HGB 14.9 10/01/2018    HCT 46.3 10/01/2018    MCV 93 10/01/2018    MCH 30.0 10/01/2018    MCHC 32.2 10/01/2018    RDW 14.6 10/01/2018     10/01/2018       CMP RESULTS:  Lab Results   Component Value Date     2018    POTASSIUM 4.3 2018    CHLORIDE 104 2018    CO2 28 2018    ANIONGAP 8 2018     (H) 2018    BUN 18 2018    CR 1.12 2018    GFRESTIMATED 69 2018    GFRESTBLACK 84 2018    ROB 8.7 2018    BILITOTAL 1.4 (H) 10/01/2018    ALBUMIN 4.2 10/01/2018    ALKPHOS 67 10/01/2018    ALT 34 10/01/2018    AST 21 10/01/2018        INR RESULTS:  Lab Results   Component Value Date    INR 2.3 (A) 2017    INR 2.36 (H) 2017       No components found for: CK  Lab Results   Component Value Date    MAG 2.4 (H) 2017     Lab Results   Component Value Date    NTBNP 1,599 (H) 2017     @BRIEFLABR (dig)@    Most recent echocardiogram:  Recent Results (from the past 8760 hour(s))   Echocardiogram Complete    Narrative    [id number]  [id number]  [id number]  [id number]^SHANTELL^SYDNEE^           [facility]  Diagn[facility] and [facility] Floor  [address] [place] [id number]     Name: SYDNIE SIERRA  MRN: [id number]  : 1965  Study Date: 2018 07:55 AM  Age: 52 yrs  Gender: Male  [facility] [place] [facility]  Reason For Study: 1 year s/p MitraClip  Ordering Physician: SYDNEE STINSON  Referring Physician: SYDNEE STINSON  Performed By: RAE Agarwal     BSA: 1.6 m2  Height: 62 in  Weight: 126 lb  BP: 130/91 mmHg  _____________________________________________________________________________  __        Procedure  Echocardiogram with two-dimensional, color and spectral Doppler performed.  _____________________________________________________________________________  __         Interpretation Summary  The Ejection Fraction is estimated at 30-35%. Left ventricular size is normal.  Global right ventricular function is mildly reduced. The right ventricle is  normal size.  s/p MitralClip with MV mPG of 4mmHg at 90 bpm. Trace mitral insufficiency is  present.  Moderate tricuspid insufficiency is present.  This study was compared with the study from 12/15/17 .  There has been no change.  _____________________________________________________________________________  __        Left Ventricle  Left ventricular size is normal. Left ventricular wall thickness is normal.  The Ejection Fraction is estimated at 30-35%. Grade III or advanced diastolic  dysfunction. Akinesis of the basal to apical inferior, basal infero-lateral  and infero-septal segments.     Right Ventricle  The right ventricle is normal size. Global right ventricular function is  mildly reduced. A pacemaker lead is noted in the right ventricle.     Atria  Both atria appear normal. The atrial septum is intact as assessed by color  Doppler . A pacemaker lead is noted in the right atrium.     Mitral Valve  Trace mitral insufficiency is present. s/p MitralClip with MV mPG of 4mmHg at  90 bpm.        Aortic Valve  Aortic valve is normal in structure and function. The aortic valve is  tricuspid.     Tricuspid Valve  Moderate tricuspid insufficiency is present. The right ventricular systolic  pressure is approximated at 41.7 mmHg plus the right atrial pressure. Etiology  of TR is leaflet restriction by ICD lead.     Pulmonic Valve  The pulmonic valve is normal. Trace pulmonic insufficiency is present.     Vessels  The aorta root is normal. The pulmonary artery is normal. The inferior vena  cava was normal in size with preserved respiratory variability.     Pericardium  No pericardial effusion is present.        Compared to Previous Study  This study was compared with the study from 12/15/17 . There has been no  change.     Attestation  I  have personally viewed the imaging and agree with the interpretation and  report as documented by the fellow, Kirby Rodriguez, and/or edited by me.  _____________________________________________________________________________  __     MMode/2D Measurements & Calculations  IVSd: 0.84 cm  LVIDd: 5.0 cm  LVIDs: 4.5 cm  LVPWd: 0.89 cm  FS: 9.6 %  LV mass(C)d: 149.1 grams  LV mass(C)dI: 94.9 grams/m2  Ao root diam: 2.6 cm  asc Aorta Diam: 3.0 cm  LVOT diam: 2.0 cm  LVOT area: 3.1 cm2     EF(MOD-bp): 26.7 %  LA Volume (BP): 42.8 ml     LA Volume Index (BP): 27.3 ml/m2  RWT: 0.36        Doppler Measurements & Calculations  MV E max frank: 129.0 cm/sec  MV A max frank: 62.4 cm/sec  MV E/A: 2.1  MV max PG: 10.9 mmHg  MV mean PG: 3.6 mmHg  MV V2 VTI: 26.1 cm  MVA(VTI): 1.4 cm2  MV dec time: 0.10 sec  Ao V2 max: 97.9 cm/sec  Ao max P.0 mmHg  Ao V2 mean: 71.0 cm/sec  Ao mean P.3 mmHg  Ao V2 VTI: 18.3 cm  MICHELLE(I,D): 2.0 cm2  MICHELLE(V,D): 2.2 cm2  LV V1 max P.9 mmHg  LV V1 max: 68.9 cm/sec  LV V1 VTI: 12.0 cm  SV(LVOT): 37.2 ml  SI(LVOT): 23.7 ml/m2  PA V2 max: 65.2 cm/sec  PA max P.7 mmHg  PA acc time: 0.13 sec  PI end-d frank: 160.2 cm/sec  TR max frank: 322.8 cm/sec  TR max P.7 mmHg  AV Frank Ratio (DI): 0.70     MICHELLE Index (cm2/m2): 1.3  E/E' av.1  Lateral E/e': 32.3  Medial E/e': 35.8     _____________________________________________________________________________  __           Report approved by: Isabel Love 2018 09:49 AM            Assessment and Plan:    Luke is a 53 year old gentleman with a past medical history of ICM who appears euvolemic today.  His blood pressure today in clinic is 112/75.  He did bring in his blood pressure cuff today and it is correlating with our blood pressure reading that we took in the office.    We discussed staying on his current Entresto dose, but unfortunately Luke does not feel comfortable doing this because of his low blood pressure despite being asymptomatic,  therefore I have decreased his Entresto back to 49/51 mg twice daily per his request.    Since he is feeling so well, we will stretch out his CORE appointments.  He will return to clinic in March and follow-up with Dr. Amaral in April as previously arranged.     1.  Chronic systolic heart failure secondary to ischemic cardiomyopathy.  Stage D NYHA Class II   ACEi/ARB: Decrease Entresto to 49/51 mg twice daily.  BB: yes, Toprol  mg daily.   Aldosterone antagonist: Deferred due to worsening renal function with previous attempts.   SCD prophylaxis: ICD  Fluid status:  Euvolemic.  Anticoagulation: None.   Antiplatelet:  ASA dose 81 mg daily.   Sleep apnea:  Not documented.  NSAID use:  Contraindicated.  Avoid use.  Remote Monitoring: None.   RV support:  Continue Digoxin.     2.  CAD:  Stable.  Continue Plavix, ASA, Metoprolol, Lisinopril, and Atorvastatin        3.  Mitral valve repair s/p mitral clip:  Follow up with Dr. Amaral as scheduled in April 2019.       4. Hypotension:  Decrease Entresto as outlined above. Continue Toprol  mg daily.      5. Muscle tightness:  Improved with stretching.  Recommended regular stretching. Follow up with primary MD is symptoms don't improve or worsen in the interim.    6.  Follow up:  CORE clinic in March.  Dr. Amaral in April with echo prior.       Vandana Zambrano NP

## 2019-01-15 NOTE — PATIENT INSTRUCTIONS
"You were seen today in the Cardiovascular Clinic at the [facility].     Cardiology Providers you saw during your visit: Vandana VENEGAS CNP      Follow up and medication changes:    1. Reduce Entresto to 49/51 mg - 1 tab two times a day.   2. Return to CORE Clinic in 2 months.       Results for SYDNIE SIERRA (MRN [id number]) as of 1/15/2019 11:08   Ref. Range 1/15/2019 10:20   Sodium Latest Ref Range: 133 - 144 mmol/L 133   Potassium Latest Ref Range: 3.4 - 5.3 mmol/L 4.5   Chloride Latest Ref Range: 94 - 109 mmol/L 98   Carbon Dioxide Latest Ref Range: 20 - 32 mmol/L 28   Urea Nitrogen Latest Ref Range: 7 - 30 mg/dL 32 (H)   Creatinine Latest Ref Range: 0.66 - 1.25 mg/dL 1.19   GFR Estimate Latest Ref Range: >60 mL/min/1.73_m2 70   GFR Estimate If Black Latest Ref Range: >60 mL/min/1.73_m2 81   Calcium Latest Ref Range: 8.5 - 10.1 mg/dL 8.8   Anion Gap Latest Ref Range: 3 - 14 mmol/L 7   Glucose Latest Ref Range: 70 - 99 mg/dL 198 (H)         Please limit your fluid intake to 2 L (68 ounces) daily.  2 Liters a day = 8.5 cups, or 68 ounces.  Please limit your salt intake to 2 grams a day or less.     If you gain 2# in 24 hours or 5# in one week call Soraya Messina RN so we can adjust your medications as needed over the phone.     Please feel free to call me with any questions or concerns.       Soraya Messina RN CHFN  [facility]  Cardiology Care Coordinator-Heart Failure Clinic     Questions and [phone].   First press #1 for the [id number] and then press #3 for \"Medical Questions\" to reach us Cardiology Nurses.      On Call Cardiologist for after hours or on weekends: [phone]   option #4 and ask to speak to the on-call Cardiologist. Inform them you are a CORE/heart failure patient at the [place].           If you need a medication refill please contact your pharmacy.  Please allow 3 business days for your refill to be " completed.  _______________________________________________________  [facility] Cardiomyopathy, Optimization, Rehabilitation, Education   The [facility] CLINIC is a heart failure specialty clinic within the [facility] where you will work with specialized nurse practitioners dedicated to helping patients with heart failure carefully adjust medications, receive education, and learn who and when to call if symptoms develop. They specialize in helping you better understand your condition, slow the progression of your disease, improve the length and quality of your life, help you detect future heart problems before they become life threatening, and avoid hospitalizations.  As always, thank you for trusting us with your health care needs!

## 2019-01-15 NOTE — TELEPHONE ENCOUNTER
[facility] Call Center    Phone Message    May a detailed message be left on voicemail: yes    Reason for Call: Other: Dora from the [facility] just wanting to inform clinic that they got a refill request for sacubitril-valsartan (ENTRESTO) in error. Dora states it should be going to pts retail pharmacy.     Action Taken: Message routed to:  [facility] ([facility]): [facility]

## 2019-01-15 NOTE — PROGRESS NOTES
HPI: Luke is a 53 year old gentleman with a past medical history of ICM, CAD with a RCA STEMI s/p PCI x 7 to RCA, left circumflex (now ), and LAD on 3/27/17 complicated by PEDRO, sepsis, sacral ulcer, DVT, bilateral hemispheric infarcts secondary to watershed ischemia, and cardiogenic shock s/p IABP and  subclavian balloon pump, who underwent mitral clip for ischemic MR.   He returns to clinic for routine follow up.    Luke is feeling well.  He denies dyspnea on exertion, shortness of breath at rest, PND, orthopnea, or lower extremity edema.  He has been following his sodium and fluid restrictions.  He is able to work on his feet standing for 10 hours without difficulties.  Since starting the Entresto, he feels that his heart failure is better controlled and that he has more energy.    His main concern today is that his blood pressures have been reading lower at home.  His blood pressures over the past couple of weeks have been reading in the 80s/60's range.  While he denies lightheadedness, he is concerned about the low blood pressure readings and would like to reduce his Entresto dose.    PAST MEDICAL HISTORY:  Past Medical History:   Diagnosis Date     Acne      CAD (coronary artery disease) 2017    RCA STEMI s/p balloon angioplasty and multiple Sofie to RCA, LCx, and LAD     Cardiogenic shock (H)      DVT (deep venous thrombosis) (H) 2017    left internal jugular (line-associated); left common femoral; on coumadin     Ischemic cardiomyopathy 2017    EF 30-35%     Ischemic stroke (H) 2017    no residual deficits     Lower GI bleed 2017    due to rectal ulcer     Mitral valve insufficiency, ischemic 2017    s/p Mitral Clip placement      Skin ulcer of sacrum (H)      Systolic heart failure (H)      Type 2 diabetes mellitus (H)        FAMILY HISTORY:  Family History   Problem Relation Age of Onset     Hypertension Mother      Diabetes Type 2  Father      Hypertension Father      Myocardial Infarction No family hx  of      Cerebrovascular Disease No family hx of      Coronary Artery Disease Early Onset No family hx of      Colon Cancer No family hx of      Prostate Cancer No family hx of        SOCIAL HISTORY:  Social History     Social History     Marital status:      Spouse name: N/A     Number of children: N/A     Years of education: N/A     Occupational History     [employer]      Social History Main Topics     Smoking status: Former Smoker     Packs/day: 0.50     Years: 30.00     Types: Cigarettes     Quit date: 1/1/2017     Smokeless tobacco: Never Used     Alcohol use No     Drug use: No     Sexual activity: Not Currently     Other Topics Concern     None     Social History Narrative    . Remarried.    4 children with first marriage.    2 grand children.    Walks daily, but no formal exercise.            CURRENT MEDICATIONS:  Current Outpatient Medications   Medication Sig Dispense Refill     aspirin 81 MG chewable tablet Take 1 tablet (81 mg) by mouth daily 90 tablet 3     atorvastatin (LIPITOR) 40 MG tablet Take 1 tablet (40 mg) by mouth daily 60 tablet 5     bumetanide (BUMEX) 0.5 MG tablet Take 2 tabs daily until weight down 3 lbs. Then reduce dose to 0.5 mg daily. 45 tablet 3     cetirizine (ZYRTEC) 10 MG tablet TAKE 1 TABLET (10 MG) BY MOUTH DAILY 60 tablet 10     digoxin (LANOXIN) 125 MCG tablet Take one tablet on M, W, F, Sunday. 60 tablet 5     fluticasone (FLONASE) 50 MCG/ACT spray Spray 2 sprays into both nostrils daily 3 Bottle 3     glipiZIDE (GLUCOTROL) 5 MG tablet TAKE 1 TABLET (5 MG) BY MOUTH 2 TIMES DAILY (BEFORE MEALS) 180 tablet 3     insulin pen needle (BD KYLEE U/F) 32G X 4 MM Use 3 daily or as directed. 100 each prn     metFORMIN (GLUCOPHAGE-XR) 500 MG 24 hr tablet Take 1,000 mg by mouth daily (with dinner)  180 tablet 3     metoprolol succinate ER (TOPROL-XL) 50 MG 24 hr tablet Take 3 tablets (150 mg) by mouth daily 180 tablet 3     ONETOUCH ULTRA test strip USE TO TEST  "BLOOD SUGAR 3 TIMES DAILY OR AS DIRECTED. 100 strip 3     sacubitril-valsartan (ENTRESTO)  MG per tablet Take 1 tablet by mouth 2 times daily 60 tablet 3       ROS:  Constitutional: Denies fever, chills, or diaphoresis.   No significant weight fluctuations.   ENT: See HPI.    Respiratory:  See HPI  Cardiovascular: As per HPI.   GI: Denies nausea, vomiting, diarrhea, hematemesis, melena, or hematochezia.   : Denies urinary frequency, dysuria, or hematuria.   Integument: Negative for bruising, rash, or pruritis.  Psychiatric: Negative for anxiety, depression, sleep disturbance, or mood changes.   Neuro: Negative for headaches, syncope, numbness or tingling.   Endocrinology: Negative for thyroid disorder, night sweats, +diabetes.   Musculoskeletal: + Muscle tightness in his hamstrings.    EXAM:  /75 (BP Location: Left arm, Patient Position: Chair, Cuff Size: Adult Regular)   Pulse 75   Ht 1.575 m (5' 2\")   Wt 58.5 kg (129 lb)   SpO2 99%   BMI 23.59 kg/m    General: alert, articulate, and in no acute distress.  HEENT: normocephalic, atraumatic, anicteric sclera, EOMI, mucosa moist, no cyanosis.   Neck: neck supple. JVP 9 cm.  Heart: regular rhythm, normal S1/S2, no murmur, gallop, rub.  Precordium quiet with normal PMI.     Lungs: clear, no rales, ronchi, or wheezing.  No accessory muscle use, respirations unlabored.   Abdomen: Soft, non-tender, bowel sounds present, no hepatomegaly  Extremities: No pitting edema.   No cyanosis.  Extremities warm.  2+ pedal pulses.   Neurological: Alert and oriented x 3.  Normal speech and affect, no gross motor deficits  Skin:  No ecchymoses or rashes.     Labs:  CBC RESULTS:  Lab Results   Component Value Date    WBC 10.7 10/01/2018    RBC 4.97 10/01/2018    HGB 14.9 10/01/2018    HCT 46.3 10/01/2018    MCV 93 10/01/2018    MCH 30.0 10/01/2018    MCHC 32.2 10/01/2018    RDW 14.6 10/01/2018     10/01/2018       CMP RESULTS:  Lab Results   Component Value Date "     2018    POTASSIUM 4.3 2018    CHLORIDE 104 2018    CO2 28 2018    ANIONGAP 8 2018     (H) 2018    BUN 18 2018    CR 1.12 2018    GFRESTIMATED 69 2018    GFRESTBLACK 84 2018    ROB 8.7 2018    BILITOTAL 1.4 (H) 10/01/2018    ALBUMIN 4.2 10/01/2018    ALKPHOS 67 10/01/2018    ALT 34 10/01/2018    AST 21 10/01/2018        INR RESULTS:  Lab Results   Component Value Date    INR 2.3 (A) 2017    INR 2.36 (H) 2017       No components found for: CK  Lab Results   Component Value Date    MAG 2.4 (H) 2017     Lab Results   Component Value Date    NTBNP 1,599 (H) 2017     @BRIEFLABR (dig)@    Most recent echocardiogram:  Recent Results (from the past 8760 hour(s))   Echocardiogram Complete    Narrative    [id number]  [id number]  [id number]  [id number]^SHANTELL^SYDNEE^           [facility]  Diagnostic and [facility]-3rd Floor  [address] [place] [id number]     Name: SYDNIE SIERRA  MRN: [id number]  : 1965  Study Date: 2018 07:55 AM  Age: 52 yrs  Gender: Male  Patient Location: [facility]  Reason For Study: 1 year s/p MitraClip  Ordering Physician: SYDNEE STINSON  Referring Physician: SYDNEE STINSON  Performed By: DARRIAN Agarwal     BSA: 1.6 m2  Height: 62 in  Weight: 126 lb  BP: 130/91 mmHg  _____________________________________________________________________________  __        Procedure  Echocardiogram with two-dimensional, color and spectral Doppler performed.  _____________________________________________________________________________  __        Interpretation Summary  The Ejection Fraction is estimated at 30-35%. Left ventricular size is normal.  Global right ventricular function is mildly reduced. The right ventricle is  normal size.  s/p MitralClip with MV mPG of 4mmHg at 90 bpm. Trace mitral insufficiency is  present.  Moderate tricuspid  insufficiency is present.  This study was compared with the study from 12/15/17 .  There has been no change.  _____________________________________________________________________________  __        Left Ventricle  Left ventricular size is normal. Left ventricular wall thickness is normal.  The Ejection Fraction is estimated at 30-35%. Grade III or advanced diastolic  dysfunction. Akinesis of the basal to apical inferior, basal infero-lateral  and infero-septal segments.     Right Ventricle  The right ventricle is normal size. Global right ventricular function is  mildly reduced. A pacemaker lead is noted in the right ventricle.     Atria  Both atria appear normal. The atrial septum is intact as assessed by color  Doppler . A pacemaker lead is noted in the right atrium.     Mitral Valve  Trace mitral insufficiency is present. s/p MitralClip with MV mPG of 4mmHg at  90 bpm.        Aortic Valve  Aortic valve is normal in structure and function. The aortic valve is  tricuspid.     Tricuspid Valve  Moderate tricuspid insufficiency is present. The right ventricular systolic  pressure is approximated at 41.7 mmHg plus the right atrial pressure. Etiology  of TR is leaflet restriction by ICD lead.     Pulmonic Valve  The pulmonic valve is normal. Trace pulmonic insufficiency is present.     Vessels  The aorta root is normal. The pulmonary artery is normal. The inferior vena  cava was normal in size with preserved respiratory variability.     Pericardium  No pericardial effusion is present.        Compared to Previous Study  This study was compared with the study from 12/15/17 . There has been no  change.     Attestation  I have personally viewed the imaging and agree with the interpretation and  report as documented by the fellow, Kirby Rodriguez, and/or edited by me.  _____________________________________________________________________________  __     MMode/2D Measurements & Calculations  IVSd: 0.84 cm  LVIDd: 5.0  cm  LVIDs: 4.5 cm  LVPWd: 0.89 cm  FS: 9.6 %  LV mass(C)d: 149.1 grams  LV mass(C)dI: 94.9 grams/m2  Ao root diam: 2.6 cm  asc Aorta Diam: 3.0 cm  LVOT diam: 2.0 cm  LVOT area: 3.1 cm2     EF(MOD-bp): 26.7 %  LA Volume (BP): 42.8 ml     LA Volume Index (BP): 27.3 ml/m2  RWT: 0.36        Doppler Measurements & Calculations  MV E max frank: 129.0 cm/sec  MV A max frank: 62.4 cm/sec  MV E/A: 2.1  MV max PG: 10.9 mmHg  MV mean PG: 3.6 mmHg  MV V2 VTI: 26.1 cm  MVA(VTI): 1.4 cm2  MV dec time: 0.10 sec  Ao V2 max: 97.9 cm/sec  Ao max P.0 mmHg  Ao V2 mean: 71.0 cm/sec  Ao mean P.3 mmHg  Ao V2 VTI: 18.3 cm  MICHELLE(I,D): 2.0 cm2  MICHELLE(V,D): 2.2 cm2  LV V1 max P.9 mmHg  LV V1 max: 68.9 cm/sec  LV V1 VTI: 12.0 cm  SV(LVOT): 37.2 ml  SI(LVOT): 23.7 ml/m2  PA V2 max: 65.2 cm/sec  PA max P.7 mmHg  PA acc time: 0.13 sec  PI end-d frank: 160.2 cm/sec  TR max frank: 322.8 cm/sec  TR max P.7 mmHg  AV Frank Ratio (DI): 0.70     MICHELLE Index (cm2/m2): 1.3  E/E' av.1  Lateral E/e': 32.3  Medial E/e': 35.8     _____________________________________________________________________________  __           Report approved by: Isabel Love 2018 09:49 AM            Assessment and Plan:    Luke is a 53 year old gentleman with a past medical history of ICM who appears euvolemic today.  His blood pressure today in clinic is 112/75.  He did bring in his blood pressure cuff today and it is correlating with our blood pressure reading that we took in the office.    We discussed staying on his current Entresto dose, but unfortunately Luke does not feel comfortable doing this because of his low blood pressure despite being asymptomatic, therefore I have decreased his Entresto back to 49/51 mg twice daily per his request.    Since he is feeling so well, we will stretch out his [facility] appointments.  He will return to clinic in March and follow-up with Dr. Amaral in April as previously arranged.     1.  Chronic systolic heart failure  secondary to ischemic cardiomyopathy.  Stage D NYHA Class II   ACEi/ARB: Decrease Entresto to 49/51 mg twice daily.  BB: yes, Toprol  mg daily.   Aldosterone antagonist: Deferred due to worsening renal function with previous attempts.   SCD prophylaxis: ICD  Fluid status:  Euvolemic.  Anticoagulation: None.   Antiplatelet:  ASA dose 81 mg daily.   Sleep apnea:  Not documented.  NSAID use:  Contraindicated.  Avoid use.  Remote Monitoring: None.   RV support:  Continue Digoxin.     2.  CAD:  Stable.  Continue Plavix, ASA, Metoprolol, Lisinopril, and Atorvastatin        3.  Mitral valve repair s/p mitral clip:  Follow up with Dr. Amaral as scheduled in April 2019.       4. Hypotension:  Decrease Entresto as outlined above. Continue Toprol  mg daily.      5. Muscle tightness:  Improved with stretching.  Recommended regular stretching. Follow up with primary MD is symptoms don't improve or worsen in the interim.    6.  Follow up:  [facility] clinic in March.  Dr. Amaral in April with echo prior.       Vandana VENEGAS, BRANDON  [facility]

## 2019-01-15 NOTE — NURSING NOTE
Diet: Patient instructed regarding a heart healthy diet, including discussion of reduced fat and sodium intake. Patient demonstrated understanding of this information and agreed to call with further questions or concerns.  Labs: Patient was given results of the laboratory testing obtained today. Patient was instructed to return for the next laboratory testing in 2 months with return visit. Patient demonstrated understanding of this information and agreed to call with further questions or concerns.   Return Appointment: Patient given instructions regarding scheduling next clinic visit. Patient demonstrated understanding of this information and agreed to call with further questions or concerns.  Medication Change: Patient was educated regarding prescribed medication change, including discussion of the indication, administration, side effects, and when to report to MD or RN. Patient demonstrated understanding of this information and agreed to call with further questions or concerns.  Patient stated he understood all health information given and agreed to call with further questions or concerns.   Soraya Messina RN

## 2019-01-20 DIAGNOSIS — E11.9 TYPE 2 DIABETES MELLITUS WITHOUT COMPLICATION, WITHOUT LONG-TERM CURRENT USE OF INSULIN (H): ICD-10-CM

## 2019-01-21 ENCOUNTER — ANCILLARY PROCEDURE (OUTPATIENT)
Dept: CARDIOLOGY | Facility: CLINIC | Age: 54
End: 2019-01-21
Attending: INTERNAL MEDICINE

## 2019-01-21 DIAGNOSIS — I42.9 CARDIOMYOPATHY (H): ICD-10-CM

## 2019-01-21 PROCEDURE — 93295 DEV INTERROG REMOTE 1/2/MLT: CPT | Performed by: INTERNAL MEDICINE

## 2019-01-21 PROCEDURE — 93296 REM INTERROG EVL PM/IDS: CPT | Mod: ZF

## 2019-01-21 RX ORDER — METFORMIN HCL 500 MG
1000 TABLET, EXTENDED RELEASE 24 HR ORAL
Qty: 180 TABLET | Refills: 0 | Status: SHIPPED | OUTPATIENT
Start: 2019-01-21 | End: 2019-04-16

## 2019-01-21 NOTE — TELEPHONE ENCOUNTER
"Requested Prescriptions   Pending Prescriptions Disp Refills     metFORMIN (GLUCOPHAGE-XR) 500 MG 24 hr tablet [Pharmacy Med Name: METFORMIN  MG TABLET] 180 tablet 3     Sig: TAKE 2 TABLETS (1,000 MG) BY MOUTH DAILY (WITH DINNER)    Biguanide Agents Failed - 1/20/2019  1:46 PM       Failed - Patient has had a Microalbumin in the past 15 mos.    No lab results found.         Passed - Blood pressure less than 140/90 in past 6 months    BP Readings from Last 3 Encounters:   01/15/19 112/75   12/04/18 (!) 144/99   11/13/18 143/90                Passed - Patient has documented LDL within the past 12 mos.    Recent Labs   Lab Test 10/22/18  0909   LDL 50            Passed - Patient is age 10 or older       Passed - Patient has documented A1c within the specified period of time.    If HgbA1C is 8 or greater, it needs to be on file within the past 3 months.  If less than 8, must be on file within the past 6 months.     Recent Labs   Lab Test 10/01/18  1018   A1C 6.6*            Passed - Patient's CR is NOT>1.4 OR Patient's EGFR is NOT<45 within past 12 mos.    Recent Labs   Lab Test 01/15/19  1020   GFRESTIMATED 70   GFRESTBLACK 81       Recent Labs   Lab Test 01/15/19  1020   CR 1.19            Passed - Patient does NOT have a diagnosis of CHF.       Passed - Medication is active on med list       Passed - Recent (6 mo) or future (30 days) visit within the authorizing provider's specialty    Patient had office visit in the last 6 months or has a visit in the next 30 days with authorizing provider or within the authorizing provider's specialty.  See \"Patient Info\" tab in inbasket, or \"Choose Columns\" in Meds & Orders section of the refill encounter.            Last Written Prescription Date:  03/12/2018  Last Fill Quantity: 180,  # refills: 3   Last office visit: 10/1/2018 with prescribing provider:  Dr Zambrano   Future Office Visit:   Next 5 appointments (look out 90 days)    Apr 02, 2019 10:45 AM CDT  Office " Visit with Shanna Church MD  [facility] ([facility] [address] [address] [phone]

## 2019-01-30 LAB
MDC_IDC_LEAD_IMPLANT_DT: NORMAL
MDC_IDC_LEAD_LOCATION: NORMAL
MDC_IDC_LEAD_LOCATION_DETAIL_1: NORMAL
MDC_IDC_LEAD_MFG: NORMAL
MDC_IDC_LEAD_MODEL: NORMAL
MDC_IDC_LEAD_POLARITY_TYPE: NORMAL
MDC_IDC_LEAD_SERIAL: NORMAL
MDC_IDC_LEAD_SPECIAL_FUNCTION: NORMAL
MDC_IDC_MSMT_BATTERY_DTM: NORMAL
MDC_IDC_MSMT_BATTERY_REMAINING_LONGEVITY: 132 MO
MDC_IDC_MSMT_BATTERY_RRT_TRIGGER: 2.73
MDC_IDC_MSMT_BATTERY_STATUS: NORMAL
MDC_IDC_MSMT_BATTERY_VOLTAGE: 3.04 V
MDC_IDC_MSMT_CAP_CHARGE_DTM: NORMAL
MDC_IDC_MSMT_CAP_CHARGE_ENERGY: 18 J
MDC_IDC_MSMT_CAP_CHARGE_TIME: 3.68
MDC_IDC_MSMT_CAP_CHARGE_TYPE: NORMAL
MDC_IDC_MSMT_LEADCHNL_RV_IMPEDANCE_VALUE: 304 OHM
MDC_IDC_MSMT_LEADCHNL_RV_IMPEDANCE_VALUE: 323 OHM
MDC_IDC_MSMT_LEADCHNL_RV_PACING_THRESHOLD_AMPLITUDE: 0.5 V
MDC_IDC_MSMT_LEADCHNL_RV_PACING_THRESHOLD_PULSEWIDTH: 0.4 MS
MDC_IDC_MSMT_LEADCHNL_RV_SENSING_INTR_AMPL: 9.5 MV
MDC_IDC_MSMT_LEADCHNL_RV_SENSING_INTR_AMPL: 9.5 MV
MDC_IDC_PG_IMPLANT_DTM: NORMAL
MDC_IDC_PG_MFG: NORMAL
MDC_IDC_PG_MODEL: NORMAL
MDC_IDC_PG_SERIAL: NORMAL
MDC_IDC_PG_TYPE: NORMAL
MDC_IDC_SESS_CLINIC_NAME: NORMAL
MDC_IDC_SESS_DTM: NORMAL
MDC_IDC_SESS_TYPE: NORMAL
MDC_IDC_SET_BRADY_HYSTRATE: NORMAL
MDC_IDC_SET_BRADY_LOWRATE: 40 {BEATS}/MIN
MDC_IDC_SET_BRADY_MODE: NORMAL
MDC_IDC_SET_LEADCHNL_RV_PACING_AMPLITUDE: 2 V
MDC_IDC_SET_LEADCHNL_RV_PACING_ANODE_ELECTRODE_1: NORMAL
MDC_IDC_SET_LEADCHNL_RV_PACING_ANODE_LOCATION_1: NORMAL
MDC_IDC_SET_LEADCHNL_RV_PACING_CAPTURE_MODE: NORMAL
MDC_IDC_SET_LEADCHNL_RV_PACING_CATHODE_ELECTRODE_1: NORMAL
MDC_IDC_SET_LEADCHNL_RV_PACING_CATHODE_LOCATION_1: NORMAL
MDC_IDC_SET_LEADCHNL_RV_PACING_POLARITY: NORMAL
MDC_IDC_SET_LEADCHNL_RV_PACING_PULSEWIDTH: 0.4 MS
MDC_IDC_SET_LEADCHNL_RV_SENSING_ANODE_ELECTRODE_1: NORMAL
MDC_IDC_SET_LEADCHNL_RV_SENSING_ANODE_LOCATION_1: NORMAL
MDC_IDC_SET_LEADCHNL_RV_SENSING_CATHODE_ELECTRODE_1: NORMAL
MDC_IDC_SET_LEADCHNL_RV_SENSING_CATHODE_LOCATION_1: NORMAL
MDC_IDC_SET_LEADCHNL_RV_SENSING_POLARITY: NORMAL
MDC_IDC_SET_LEADCHNL_RV_SENSING_SENSITIVITY: 0.3 MV
MDC_IDC_SET_ZONE_DETECTION_BEATS_DENOMINATOR: 40 {BEATS}
MDC_IDC_SET_ZONE_DETECTION_BEATS_NUMERATOR: 30 {BEATS}
MDC_IDC_SET_ZONE_DETECTION_INTERVAL: 300 MS
MDC_IDC_SET_ZONE_DETECTION_INTERVAL: 360 MS
MDC_IDC_SET_ZONE_DETECTION_INTERVAL: 390 MS
MDC_IDC_SET_ZONE_DETECTION_INTERVAL: NORMAL
MDC_IDC_SET_ZONE_TYPE: NORMAL
MDC_IDC_STAT_AT_BURDEN_PERCENT: 0 %
MDC_IDC_STAT_AT_DTM_END: NORMAL
MDC_IDC_STAT_AT_DTM_START: NORMAL
MDC_IDC_STAT_BRADY_DTM_END: NORMAL
MDC_IDC_STAT_BRADY_DTM_START: NORMAL
MDC_IDC_STAT_BRADY_RV_PERCENT_PACED: 0.01 %
MDC_IDC_STAT_EPISODE_RECENT_COUNT: 0
MDC_IDC_STAT_EPISODE_RECENT_COUNT_DTM_END: NORMAL
MDC_IDC_STAT_EPISODE_RECENT_COUNT_DTM_START: NORMAL
MDC_IDC_STAT_EPISODE_TOTAL_COUNT: 0
MDC_IDC_STAT_EPISODE_TOTAL_COUNT_DTM_END: NORMAL
MDC_IDC_STAT_EPISODE_TOTAL_COUNT_DTM_START: NORMAL
MDC_IDC_STAT_EPISODE_TYPE: NORMAL
MDC_IDC_STAT_TACHYTHERAPY_ATP_DELIVERED_RECENT: 0
MDC_IDC_STAT_TACHYTHERAPY_ATP_DELIVERED_TOTAL: 0
MDC_IDC_STAT_TACHYTHERAPY_RECENT_DTM_END: NORMAL
MDC_IDC_STAT_TACHYTHERAPY_RECENT_DTM_START: NORMAL
MDC_IDC_STAT_TACHYTHERAPY_SHOCKS_ABORTED_RECENT: 0
MDC_IDC_STAT_TACHYTHERAPY_SHOCKS_ABORTED_TOTAL: 0
MDC_IDC_STAT_TACHYTHERAPY_SHOCKS_DELIVERED_RECENT: 0
MDC_IDC_STAT_TACHYTHERAPY_SHOCKS_DELIVERED_TOTAL: 0
MDC_IDC_STAT_TACHYTHERAPY_TOTAL_DTM_END: NORMAL
MDC_IDC_STAT_TACHYTHERAPY_TOTAL_DTM_START: NORMAL

## 2019-03-06 DIAGNOSIS — I25.5 ISCHEMIC CARDIOMYOPATHY: Primary | ICD-10-CM

## 2019-03-12 ENCOUNTER — OFFICE VISIT (OUTPATIENT)
Dept: CARDIOLOGY | Facility: CLINIC | Age: 54
End: 2019-03-12
Attending: NURSE PRACTITIONER
Payer: COMMERCIAL

## 2019-03-12 VITALS
HEART RATE: 79 BPM | DIASTOLIC BLOOD PRESSURE: 100 MMHG | HEIGHT: 62 IN | BODY MASS INDEX: 25.03 KG/M2 | WEIGHT: 136 LBS | OXYGEN SATURATION: 99 % | SYSTOLIC BLOOD PRESSURE: 149 MMHG

## 2019-03-12 DIAGNOSIS — I34.0 MITRAL VALVE INSUFFICIENCY, UNSPECIFIED ETIOLOGY: ICD-10-CM

## 2019-03-12 DIAGNOSIS — I10 BENIGN ESSENTIAL HYPERTENSION: ICD-10-CM

## 2019-03-12 DIAGNOSIS — I25.5 ISCHEMIC CARDIOMYOPATHY: ICD-10-CM

## 2019-03-12 DIAGNOSIS — I25.10 CORONARY ARTERY DISEASE INVOLVING NATIVE CORONARY ARTERY OF NATIVE HEART WITHOUT ANGINA PECTORIS: ICD-10-CM

## 2019-03-12 DIAGNOSIS — I50.23 ACUTE ON CHRONIC SYSTOLIC (CONGESTIVE) HEART FAILURE (H): Primary | ICD-10-CM

## 2019-03-12 DIAGNOSIS — E78.5 HYPERLIPIDEMIA LDL GOAL <100: ICD-10-CM

## 2019-03-12 LAB
ANION GAP SERPL CALCULATED.3IONS-SCNC: 8 MMOL/L (ref 3–14)
BUN SERPL-MCNC: 21 MG/DL (ref 7–30)
CALCIUM SERPL-MCNC: 8 MG/DL (ref 8.5–10.1)
CHLORIDE SERPL-SCNC: 104 MMOL/L (ref 94–109)
CO2 SERPL-SCNC: 26 MMOL/L (ref 20–32)
CREAT SERPL-MCNC: 1.06 MG/DL (ref 0.66–1.25)
GFR SERPL CREATININE-BSD FRML MDRD: 80 ML/MIN/{1.73_M2}
GLUCOSE SERPL-MCNC: 238 MG/DL (ref 70–99)
POTASSIUM SERPL-SCNC: 4 MMOL/L (ref 3.4–5.3)
SODIUM SERPL-SCNC: 138 MMOL/L (ref 133–144)

## 2019-03-12 PROCEDURE — 36415 COLL VENOUS BLD VENIPUNCTURE: CPT | Performed by: NURSE PRACTITIONER

## 2019-03-12 PROCEDURE — 80048 BASIC METABOLIC PNL TOTAL CA: CPT | Performed by: NURSE PRACTITIONER

## 2019-03-12 PROCEDURE — 99214 OFFICE O/P EST MOD 30 MIN: CPT | Mod: ZP | Performed by: NURSE PRACTITIONER

## 2019-03-12 PROCEDURE — G0463 HOSPITAL OUTPT CLINIC VISIT: HCPCS | Mod: ZF

## 2019-03-12 ASSESSMENT — PAIN SCALES - GENERAL: PAINLEVEL: NO PAIN (0)

## 2019-03-12 ASSESSMENT — MIFFLIN-ST. JEOR: SCORE: 1351.14

## 2019-03-12 NOTE — LETTER
RE: Luke Henao  [address]  [facility] [address]       Dear Colleague,    Thank you for the opportunity to participate in the care of your patient, Luke Henao, at the [facility] HEART [facility] at [facility]. Please see a copy of my visit note below.      HPI: Luke is a 53 year old gentleman with a past medical history of ICM, CAD with a RCA STEMI s/p PCI x 7 to RCA, left circumflex (now ), and LAD on 3/27/17 complicated by PEDRO, sepsis, sacral ulcer, DVT, bilateral hemispheric infarcts secondary to watershed ischemia, and cardiogenic shock s/p IABP and subclavian balloon pump, who underwent mitral clip for ischemic MR.   He returns to clinic for routine follow up.    Luke feels like he is retaining more fluid.  His weight has increased from 129 pounds to 136 pounds.  He feels more swollen in his face.  Denies lower extremity edema, abdominal distention, or shortness of breath at rest.  He continues to get shortness of breath while climbing stairs.  He is able to work for 10 hours a day standing on his feet without difficulties.  He denies dyspnea on exertion unless he is walking on an incline or climbing stairs.  His blood pressure has been stable in the 115/75 range at home.  He has been eating more.  He continues to drink about 24 to 30 ounces of fluid daily.    PAST MEDICAL HISTORY:  Past Medical History:   Diagnosis Date     Acne      CAD (coronary artery disease) 2017    RCA STEMI s/p balloon angioplasty and multiple Sofie to RCA, LCx, and LAD     Cardiogenic shock (H)      DVT (deep venous thrombosis) (H) 2017    left internal jugular (line-associated); left common femoral; on coumadin     Ischemic cardiomyopathy 2017    EF 30-35%     Ischemic stroke (H) 2017    no residual deficits     Lower GI bleed 2017    due to rectal ulcer     Mitral valve insufficiency, ischemic 2017    s/p Mitral Clip placement      Skin ulcer of sacrum (H)      Systolic heart failure (H)       Type 2 diabetes mellitus (H)        FAMILY HISTORY:  Family History   Problem Relation Age of Onset     Hypertension Mother      Diabetes Type 2  Father      Hypertension Father      Myocardial Infarction No family hx of      Cerebrovascular Disease No family hx of      Coronary Artery Disease Early Onset No family hx of      Colon Cancer No family hx of      Prostate Cancer No family hx of        SOCIAL HISTORY:  Social History     Social History     Marital status:      Spouse name: N/A     Number of children: N/A     Years of education: N/A     Occupational History     [employer]      Social History Main Topics     Smoking status: Former Smoker     Packs/day: 0.50     Years: 30.00     Types: Cigarettes     Quit date: 1/1/2017     Smokeless tobacco: Never Used     Alcohol use No     Drug use: No     Sexual activity: Not Currently     Other Topics Concern     None     Social History Narrative    . Remarried.    4 children with first marriage.    2 grand children.    Walks daily, but no formal exercise.            CURRENT MEDICATIONS:  Current Outpatient Medications   Medication Sig Dispense Refill     aspirin 81 MG chewable tablet Take 1 tablet (81 mg) by mouth daily 90 tablet 3     atorvastatin (LIPITOR) 40 MG tablet Take 1 tablet (40 mg) by mouth daily 60 tablet 5     bumetanide (BUMEX) 0.5 MG tablet Take 2 tabs daily until weight down 3 lbs. Then reduce dose to 0.5 mg daily. 45 tablet 3     cetirizine (ZYRTEC) 10 MG tablet TAKE 1 TABLET (10 MG) BY MOUTH DAILY 60 tablet 10     digoxin (LANOXIN) 125 MCG tablet Take one tablet on M, W, F, Sunday. 60 tablet 5     fluticasone (FLONASE) 50 MCG/ACT spray Spray 2 sprays into both nostrils daily 3 Bottle 3     glipiZIDE (GLUCOTROL) 5 MG tablet TAKE 1 TABLET (5 MG) BY MOUTH 2 TIMES DAILY (BEFORE MEALS) 180 tablet 3     insulin pen needle (BD KYLEE U/F) 32G X 4 MM Use 3 daily or as directed. 100 each prn     metFORMIN (GLUCOPHAGE-XR) 500 MG 24 hr  "tablet TAKE 2 TABLETS (1,000 MG) BY MOUTH DAILY (WITH DINNER) 180 tablet 0     metFORMIN (GLUCOPHAGE-XR) 500 MG 24 hr tablet Take 1,000 mg by mouth daily (with dinner)  180 tablet 3     metoprolol succinate ER (TOPROL-XL) 50 MG 24 hr tablet Take 3 tablets (150 mg) by mouth daily 180 tablet 3     ONETOUCH ULTRA test strip USE TO TEST BLOOD SUGAR 3 TIMES DAILY OR AS DIRECTED. 100 strip 3     sacubitril-valsartan (ENTRESTO) 49-51 MG per tablet Take 1 tablet by mouth 2 times daily 180 tablet 3     EXAM:  BP (!) 149/100 (BP Location: Right arm, Patient Position: Chair, Cuff Size: Adult Regular)   Pulse 79   Ht 1.575 m (5' 2\")   Wt 61.7 kg (136 lb)   SpO2 99%   BMI 24.87 kg/m     General: alert, articulate, and in no acute distress.  HEENT: normocephalic, atraumatic, anicteric sclera, EOMI, mucosa moist, no cyanosis.   Neck: neck supple. JVP 11 cm.  Heart: regular rhythm, normal S1/S2, no murmur, gallop, rub.  Precordium quiet with normal PMI.     Lungs: clear, no rales, ronchi, or wheezing.  No accessory muscle use, respirations unlabored.   Abdomen: Soft, distended, non-tender, bowel sounds present, no hepatomegaly  Extremities:  No pitting edema.   No cyanosis.  Extremities warm.  2+ pedal pulses.   Neurological: Alert and oriented x 3.  Normal speech and affect, no gross motor deficits  Skin:  No ecchymoses or rashes.     Labs:  CBC RESULTS:  Lab Results   Component Value Date    WBC 10.7 10/01/2018    RBC 4.97 10/01/2018    HGB 14.9 10/01/2018    HCT 46.3 10/01/2018    MCV 93 10/01/2018    MCH 30.0 10/01/2018    MCHC 32.2 10/01/2018    RDW 14.6 10/01/2018     10/01/2018       CMP RESULTS:  Lab Results   Component Value Date     01/15/2019    POTASSIUM 4.5 01/15/2019    CHLORIDE 98 01/15/2019    CO2 28 01/15/2019    ANIONGAP 7 01/15/2019     (H) 01/15/2019    BUN 32 (H) 01/15/2019    CR 1.19 01/15/2019    GFRESTIMATED 70 01/15/2019    GFRESTBLACK 81 01/15/2019    ROB 8.8 01/15/2019    " BILITOTAL 1.4 (H) 10/01/2018    ALBUMIN 4.2 10/01/2018    ALKPHOS 67 10/01/2018    ALT 34 10/01/2018    AST 21 10/01/2018        INR RESULTS:  Lab Results   Component Value Date    INR 2.3 (A) 2017    INR 2.36 (H) 2017       No components found for: CK  Lab Results   Component Value Date    MAG 2.4 (H) 2017     Lab Results   Component Value Date    NTBNP 1,599 (H) 2017     @BRIEFLABR (dig)@    Most recent echocardiogram:  Recent Results (from the past 8760 hour(s))   Echocardiogram Complete    Narrative    [id number]  [id number]  [id number]  [id number]^SHANTELL^SYDNEE^           [facility]  Diagnostic and [facility]3rd Floor  [address] [place] [address]     Name: SYDNIE SIERRA  MRN: [id number]  : 1965  Study Date: 2018 07:55 AM  Age: 52 yrs  Gender: Male  Patient Location: [facility]  Reason For Study: 1 year s/p MitraClip  Ordering Physician: SYDNEE STINSON  Referring Physician: SYDNEE STINSON  Performed By: RAE Agarwal     BSA: 1.6 m2  Height: 62 in  Weight: 126 lb  BP: 130/91 mmHg  _____________________________________________________________________________  __        Procedure  Echocardiogram with two-dimensional, color and spectral Doppler performed.  _____________________________________________________________________________  __        Interpretation Summary  The Ejection Fraction is estimated at 30-35%. Left ventricular size is normal.  Global right ventricular function is mildly reduced. The right ventricle is  normal size.  s/p MitralClip with MV mPG of 4mmHg at 90 bpm. Trace mitral insufficiency is  present.  Moderate tricuspid insufficiency is present.  This study was compared with the study from 12/15/17 .  There has been no change.  _____________________________________________________________________________  __        Left Ventricle  Left ventricular size is normal. Left ventricular wall thickness is  normal.  The Ejection Fraction is estimated at 30-35%. Grade III or advanced diastolic  dysfunction. Akinesis of the basal to apical inferior, basal infero-lateral  and infero-septal segments.     Right Ventricle  The right ventricle is normal size. Global right ventricular function is  mildly reduced. A pacemaker lead is noted in the right ventricle.     Atria  Both atria appear normal. The atrial septum is intact as assessed by color  Doppler . A pacemaker lead is noted in the right atrium.     Mitral Valve  Trace mitral insufficiency is present. s/p MitralClip with MV mPG of 4mmHg at  90 bpm.        Aortic Valve  Aortic valve is normal in structure and function. The aortic valve is  tricuspid.     Tricuspid Valve  Moderate tricuspid insufficiency is present. The right ventricular systolic  pressure is approximated at 41.7 mmHg plus the right atrial pressure. Etiology  of TR is leaflet restriction by ICD lead.     Pulmonic Valve  The pulmonic valve is normal. Trace pulmonic insufficiency is present.     Vessels  The aorta root is normal. The pulmonary artery is normal. The inferior vena  cava was normal in size with preserved respiratory variability.     Pericardium  No pericardial effusion is present.        Compared to Previous Study  This study was compared with the study from 12/15/17 . There has been no  change.     Attestation  I have personally viewed the imaging and agree with the interpretation and  report as documented by the fellow, Kirby Rodriguez, and/or edited by me.  _____________________________________________________________________________  __     MMode/2D Measurements & Calculations  IVSd: 0.84 cm  LVIDd: 5.0 cm  LVIDs: 4.5 cm  LVPWd: 0.89 cm  FS: 9.6 %  LV mass(C)d: 149.1 grams  LV mass(C)dI: 94.9 grams/m2  Ao root diam: 2.6 cm  asc Aorta Diam: 3.0 cm  LVOT diam: 2.0 cm  LVOT area: 3.1 cm2     EF(MOD-bp): 26.7 %  LA Volume (BP): 42.8 ml     LA Volume Index (BP): 27.3 ml/m2  RWT: 0.36         Doppler Measurements & Calculations  MV E max frank: 129.0 cm/sec  MV A max frank: 62.4 cm/sec  MV E/A: 2.1  MV max PG: 10.9 mmHg  MV mean PG: 3.6 mmHg  MV V2 VTI: 26.1 cm  MVA(VTI): 1.4 cm2  MV dec time: 0.10 sec  Ao V2 max: 97.9 cm/sec  Ao max P.0 mmHg  Ao V2 mean: 71.0 cm/sec  Ao mean P.3 mmHg  Ao V2 VTI: 18.3 cm  MICHELLE(I,D): 2.0 cm2  MICHELLE(V,D): 2.2 cm2  LV V1 max P.9 mmHg  LV V1 max: 68.9 cm/sec  LV V1 VTI: 12.0 cm  SV(LVOT): 37.2 ml  SI(LVOT): 23.7 ml/m2  PA V2 max: 65.2 cm/sec  PA max P.7 mmHg  PA acc time: 0.13 sec  PI end-d frank: 160.2 cm/sec  TR max frank: 322.8 cm/sec  TR max P.7 mmHg  AV Frank Ratio (DI): 0.70     MICHELLE Index (cm2/m2): 1.3  E/E' av.1  Lateral E/e': 32.3  Medial E/e': 35.8     _____________________________________________________________________________  __           Report approved by: Isabel Love 2018 09:49 AM            Assessment and Plan:    Luke is a 53 year old gentleman with a past medical history of ICM who appears hypervolemic today.  I have instructed him to increase his Bumex to 1 mg daily until his weight goes back to his baseline weight of 129 pounds, then he will decrease his Bumex to 0.5 mg daily.  We discussed increasing his Entresto and his Toprol-XL further for guideline directed medical therapy for heart failure.  He refused to increase his medications at this time.    I did recheck his blood pressure today. Manual blood pressure recheck showed a blood pressure of 130/78.  Again patient was unwilling to increase any of his antihypertensives.  We will continue to monitor at this point.    1.  Chronic systolic heart failure secondary to ischemic cardiomyopathy.  Stage D NYHA Class II  ACEi/ARB: Entresto  49/51 mg twice daily.  BB: yes, Toprol  mg daily.   Aldosterone antagonist: Deferred due to worsening renal function with previous attempts.   SCD prophylaxis: ICD  Fluid status: Hypervolemic.  Diurese as outlined above.    Anticoagulation:  None.   Antiplatelet:  ASA dose 81 mg daily.   Sleep apnea:  Not documented.  NSAID use:  Contraindicated.  Avoid use.  Remote Monitoring: None.   RV support:  Continue Digoxin. Last digoxin level was 0.6 in June 2018.    2.  CAD:  Stable.  Continue Plavix, ASA, Metoprolol, Lisinopril, and Atorvastatin        3.  Mitral valve repair s/p mitral clip:  Follow up with Dr. Amaral as scheduled in April 2019.       4. HTN: /78.  Continue Entresto.  Patient unwilling to increase Entresto at this time.    5. Hyperlipidemia: Last lipids on 10/22/18.  , Triglycerides 139, HDL 31, LDL 50.     6.  Follow up: Dr. Amaral in April with echo prior. CORE clinic two months.    Vandana VENEGAS, BRANDON  [facility]

## 2019-03-12 NOTE — NURSING NOTE
Diet: Patient instructed regarding a heart healthy diet, including discussion of reduced fat and sodium intake. Patient demonstrated understanding of this information and agreed to call with further questions or concerns.  Labs: Patient was given results of the laboratory testing obtained today.      Patient demonstrated understanding of this information and agreed to call with further questions or concerns.   Return Appointment: Patient given instructions regarding scheduling next clinic visit. Patient demonstrated understanding of this information and agreed to call with further questions or concerns.  Medication Change: Patient was educated regarding prescribed medication change, including discussion of the indication, administration, side effects, and when to report to MD or RN. Patient demonstrated understanding of this information and agreed to call with further questions or concerns.  Will continue on Bumex 1 mg daily until weight and symptoms improve. I told Luke I would call him in a week to find out what dosing he is on and see how he is feeling.     RN notified patient's Entresto is now costing him $40 a month. Had previously been no co-pay. Called patient after he left clinic to try to let him know I was mailing an Entresto co-pay card that should hopefully work. No answer and no voicemail. Mailed co-pay card and will follow up.     Patient stated he understood all health information given and agreed to call with further questions or concerns.   Soraya Messina RN

## 2019-03-12 NOTE — PATIENT INSTRUCTIONS
"You were seen today in the Cardiovascular Clinic at the [facility].     Cardiology Providers you saw during your visit: Vandana VENEGAS CNP      Follow up and medication changes:    1. Take 1 mg Bumex daily until weight and symptoms return to baseline. Then go back to 0.5 mg Bumex daily.   2. Follow up with Dr Amaral in April as scheduled.   3. Follow up with Vandana in [facility] in 2 months.     Results for SYDNIE SIERRA (MRN [id number]) as of 3/12/2019 11:25   Ref. Range 3/12/2019 10:26   Sodium Latest Ref Range: 133 - 144 mmol/L 138   Potassium Latest Ref Range: 3.4 - 5.3 mmol/L 4.0   Chloride Latest Ref Range: 94 - 109 mmol/L 104   Carbon Dioxide Latest Ref Range: 20 - 32 mmol/L 26   Urea Nitrogen Latest Ref Range: 7 - 30 mg/dL 21   Creatinine Latest Ref Range: 0.66 - 1.25 mg/dL 1.06   GFR Estimate Latest Ref Range: >60 mL/min/1.73_m2 80   GFR Estimate If Black Latest Ref Range: >60 mL/min/1.73_m2 >90   Calcium Latest Ref Range: 8.5 - 10.1 mg/dL 8.0 (L)   Anion Gap Latest Ref Range: 3 - 14 mmol/L 8   Glucose Latest Ref Range: 70 - 99 mg/dL 238 (H)           Please limit your fluid intake to 2 L (68 ounces) daily.  2 Liters a day = 8.5 cups, or 68 ounces.  Please limit your salt intake to 2 grams a day or less.     If you gain 2# in 24 hours or 5# in one week call Soraya Messina RN so we can adjust your medications as needed over the phone.     Please feel free to call me with any questions or concerns.       Soraya Messina RN CHFN  [facility]  Cardiology Care Coordinator-Heart Failure Clinic     Questions and [phone].   First press #1 for the [facility] pte and then press #3 for \"Medical Questions\" to reach us Cardiology Nurses.      On Call Cardiologist for after hours or on weekends: [phone]   option #4 and ask to speak to the on-call Cardiologist. Inform them you are a CORE/heart failure patient at the [facility].           If you need a " medication refill please contact your pharmacy.  Please allow 3 business days for your refill to be completed.  _______________________________________________________  [facility] Cardiomyopathy, Optimization, Rehabilitation, Education   The [facility]IN[facility] is a heart failure specialty clinic within the [facility] where you will work with specialized nurse practitioners dedicated to helping patients with heart failure carefully adjust medications, receive education, and learn who and when to call if symptoms develop. They specialize in helping you better understand your condition, slow the progression of your disease, improve the length and quality of your life, help you detect future heart problems before they become life threatening, and avoid hospitalizations.  As always, thank you for trusting us with your health care needs!

## 2019-03-12 NOTE — PROGRESS NOTES
HPI: Luke is a 53 year old gentleman with a past medical history of ICM, CAD with a RCA STEMI s/p PCI x 7 to RCA, left circumflex (now ), and LAD on 3/27/17 complicated by PEDRO, sepsis, sacral ulcer, DVT, bilateral hemispheric infarcts secondary to watershed ischemia, and cardiogenic shock s/p IABP and subclavian balloon pump, who underwent mitral clip for ischemic MR.   He returns to clinic for routine follow up.    Luke feels like he is retaining more fluid.  His weight has increased from 129 pounds to 136 pounds.  He feels more swollen in his face.  Denies lower extremity edema, abdominal distention, or shortness of breath at rest.  He continues to get shortness of breath while climbing stairs.  He is able to work for 10 hours a day standing on his feet without difficulties.  He denies dyspnea on exertion unless he is walking on an incline or climbing stairs.  His blood pressure has been stable in the 115/75 range at home.  He has been eating more.  He continues to drink about 24 to 30 ounces of fluid daily.    PAST MEDICAL HISTORY:  Past Medical History:   Diagnosis Date     Acne      CAD (coronary artery disease) 2017    RCA STEMI s/p balloon angioplasty and multiple Sofie to RCA, LCx, and LAD     Cardiogenic shock (H)      DVT (deep venous thrombosis) (H) 2017    left internal jugular (line-associated); left common femoral; on coumadin     Ischemic cardiomyopathy 2017    EF 30-35%     Ischemic stroke (H) 2017    no residual deficits     Lower GI bleed 2017    due to rectal ulcer     Mitral valve insufficiency, ischemic 2017    s/p Mitral Clip placement      Skin ulcer of sacrum (H)      Systolic heart failure (H)      Type 2 diabetes mellitus (H)        FAMILY HISTORY:  Family History   Problem Relation Age of Onset     Hypertension Mother      Diabetes Type 2  Father      Hypertension Father      Myocardial Infarction No family hx of      Cerebrovascular Disease No family hx of      Coronary Artery Disease  Early Onset No family hx of      Colon Cancer No family hx of      Prostate Cancer No family hx of        SOCIAL HISTORY:  Social History     Social History     Marital status:      Spouse name: N/A     Number of children: N/A     Years of education: N/A     Occupational History     [employer]      Social History Main Topics     Smoking status: Former Smoker     Packs/day: 0.50     Years: 30.00     Types: Cigarettes     Quit date: 1/1/2017     Smokeless tobacco: Never Used     Alcohol use No     Drug use: No     Sexual activity: Not Currently     Other Topics Concern     None     Social History Narrative    . Remarried.    4 children with first marriage.    2 grand children.    Walks daily, but no formal exercise.            CURRENT MEDICATIONS:  Current Outpatient Medications   Medication Sig Dispense Refill     aspirin 81 MG chewable tablet Take 1 tablet (81 mg) by mouth daily 90 tablet 3     atorvastatin (LIPITOR) 40 MG tablet Take 1 tablet (40 mg) by mouth daily 60 tablet 5     bumetanide (BUMEX) 0.5 MG tablet Take 2 tabs daily until weight down 3 lbs. Then reduce dose to 0.5 mg daily. 45 tablet 3     cetirizine (ZYRTEC) 10 MG tablet TAKE 1 TABLET (10 MG) BY MOUTH DAILY 60 tablet 10     digoxin (LANOXIN) 125 MCG tablet Take one tablet on M, W, F, Sunday. 60 tablet 5     fluticasone (FLONASE) 50 MCG/ACT spray Spray 2 sprays into both nostrils daily 3 Bottle 3     glipiZIDE (GLUCOTROL) 5 MG tablet TAKE 1 TABLET (5 MG) BY MOUTH 2 TIMES DAILY (BEFORE MEALS) 180 tablet 3     insulin pen needle (BD KYLEE U/F) 32G X 4 MM Use 3 daily or as directed. 100 each prn     metFORMIN (GLUCOPHAGE-XR) 500 MG 24 hr tablet TAKE 2 TABLETS (1,000 MG) BY MOUTH DAILY (WITH DINNER) 180 tablet 0     metFORMIN (GLUCOPHAGE-XR) 500 MG 24 hr tablet Take 1,000 mg by mouth daily (with dinner)  180 tablet 3     metoprolol succinate ER (TOPROL-XL) 50 MG 24 hr tablet Take 3 tablets (150 mg) by mouth daily 180 tablet 3      "ONETOUCH ULTRA test strip USE TO TEST BLOOD SUGAR 3 TIMES DAILY OR AS DIRECTED. 100 strip 3     sacubitril-valsartan (ENTRESTO) 49-51 MG per tablet Take 1 tablet by mouth 2 times daily 180 tablet 3       ROS:  Constitutional: Denies fever, chills, or diaphoresis.   + Weight gain  ENT: See HPI.    Respiratory:  See HPI  Cardiovascular: As per HPI.   GI: Denies nausea, vomiting, diarrhea, hematemesis, melena, or hematochezia.   : Denies urinary frequency, dysuria, or hematuria.   Integument: Negative for bruising, rash, or pruritis.  Psychiatric: Negative for anxiety, depression, sleep disturbance, or mood changes.   Neuro: Negative for headaches, syncope, numbness or tingling.   Endocrinology: Negative for thyroid disorder, night sweats, +diabetes.   Musculoskeletal: Occasional muscle cramping, improved.    EXAM:  BP (!) 149/100 (BP Location: Right arm, Patient Position: Chair, Cuff Size: Adult Regular)   Pulse 79   Ht 1.575 m (5' 2\")   Wt 61.7 kg (136 lb)   SpO2 99%   BMI 24.87 kg/m    General: alert, articulate, and in no acute distress.  HEENT: normocephalic, atraumatic, anicteric sclera, EOMI, mucosa moist, no cyanosis.   Neck: neck supple. JVP 11 cm.  Heart: regular rhythm, normal S1/S2, no murmur, gallop, rub.  Precordium quiet with normal PMI.     Lungs: clear, no rales, ronchi, or wheezing.  No accessory muscle use, respirations unlabored.   Abdomen: Soft, distended, non-tender, bowel sounds present, no hepatomegaly  Extremities:  No pitting edema.   No cyanosis.  Extremities warm.  2+ pedal pulses.   Neurological: Alert and oriented x 3.  Normal speech and affect, no gross motor deficits  Skin:  No ecchymoses or rashes.     Labs:  CBC RESULTS:  Lab Results   Component Value Date    WBC 10.7 10/01/2018    RBC 4.97 10/01/2018    HGB 14.9 10/01/2018    HCT 46.3 10/01/2018    MCV 93 10/01/2018    MCH 30.0 10/01/2018    MCHC 32.2 10/01/2018    RDW 14.6 10/01/2018     10/01/2018       CMP " RESULTS:  Lab Results   Component Value Date     01/15/2019    POTASSIUM 4.5 01/15/2019    CHLORIDE 98 01/15/2019    CO2 28 01/15/2019    ANIONGAP 7 01/15/2019     (H) 01/15/2019    BUN 32 (H) 01/15/2019    CR 1.19 01/15/2019    GFRESTIMATED 70 01/15/2019    GFRESTBLACK 81 01/15/2019    ROB 8.8 01/15/2019    BILITOTAL 1.4 (H) 10/01/2018    ALBUMIN 4.2 10/01/2018    ALKPHOS 67 10/01/2018    ALT 34 10/01/2018    AST 21 10/01/2018        INR RESULTS:  Lab Results   Component Value Date    INR 2.3 (A) 2017    INR 2.36 (H) 2017       No components found for: CK  Lab Results   Component Value Date    MAG 2.4 (H) 2017     Lab Results   Component Value Date    NTBNP 1,599 (H) 2017     @BRIEFLABR (dig)@    Most recent echocardiogram:  Recent Results (from the past 8760 hour(s))   Echocardiogram Complete    Narrative    [id number]  ECH19  [id number]  [id number]^SHANTELL^SYDNEE^           [facility]  Diagnostic and [facility]-3rd Floor  [address] [place] [id number]     Name: SYDNIE SIERRA  MRN: [id number]  : 1965  Study Date: 2018 07:55 AM  Age: 52 yrs  Gender: Male  Patient Location: [facility]  Reason For Study: 1 year s/p MitraClip  Ordering Physician: SYDNEE STINSON  Referring Physician: SYDNEE STINSON  Performed By: RAE Agarwal     BSA: 1.6 m2  Height: 62 in  Weight: 126 lb  BP: 130/91 mmHg  _____________________________________________________________________________  __        Procedure  Echocardiogram with two-dimensional, color and spectral Doppler performed.  _____________________________________________________________________________  __        Interpretation Summary  The Ejection Fraction is estimated at 30-35%. Left ventricular size is normal.  Global right ventricular function is mildly reduced. The right ventricle is  normal size.  s/p MitralClip with MV mPG of 4mmHg at 90 bpm. Trace mitral  insufficiency is  present.  Moderate tricuspid insufficiency is present.  This study was compared with the study from 12/15/17 .  There has been no change.  _____________________________________________________________________________  __        Left Ventricle  Left ventricular size is normal. Left ventricular wall thickness is normal.  The Ejection Fraction is estimated at 30-35%. Grade III or advanced diastolic  dysfunction. Akinesis of the basal to apical inferior, basal infero-lateral  and infero-septal segments.     Right Ventricle  The right ventricle is normal size. Global right ventricular function is  mildly reduced. A pacemaker lead is noted in the right ventricle.     Atria  Both atria appear normal. The atrial septum is intact as assessed by color  Doppler . A pacemaker lead is noted in the right atrium.     Mitral Valve  Trace mitral insufficiency is present. s/p MitralClip with MV mPG of 4mmHg at  90 bpm.        Aortic Valve  Aortic valve is normal in structure and function. The aortic valve is  tricuspid.     Tricuspid Valve  Moderate tricuspid insufficiency is present. The right ventricular systolic  pressure is approximated at 41.7 mmHg plus the right atrial pressure. Etiology  of TR is leaflet restriction by ICD lead.     Pulmonic Valve  The pulmonic valve is normal. Trace pulmonic insufficiency is present.     Vessels  The aorta root is normal. The pulmonary artery is normal. The inferior vena  cava was normal in size with preserved respiratory variability.     Pericardium  No pericardial effusion is present.        Compared to Previous Study  This study was compared with the study from 12/15/17 . There has been no  change.     Attestation  I have personally viewed the imaging and agree with the interpretation and  report as documented by the fellow, Kirby Rodriguez, and/or edited by me.  _____________________________________________________________________________  __     MMode/2D Measurements &  Calculations  IVSd: 0.84 cm  LVIDd: 5.0 cm  LVIDs: 4.5 cm  LVPWd: 0.89 cm  FS: 9.6 %  LV mass(C)d: 149.1 grams  LV mass(C)dI: 94.9 grams/m2  Ao root diam: 2.6 cm  asc Aorta Diam: 3.0 cm  LVOT diam: 2.0 cm  LVOT area: 3.1 cm2     EF(MOD-bp): 26.7 %  LA Volume (BP): 42.8 ml     LA Volume Index (BP): 27.3 ml/m2  RWT: 0.36        Doppler Measurements & Calculations  MV E max frank: 129.0 cm/sec  MV A max frank: 62.4 cm/sec  MV E/A: 2.1  MV max PG: 10.9 mmHg  MV mean PG: 3.6 mmHg  MV V2 VTI: 26.1 cm  MVA(VTI): 1.4 cm2  MV dec time: 0.10 sec  Ao V2 max: 97.9 cm/sec  Ao max P.0 mmHg  Ao V2 mean: 71.0 cm/sec  Ao mean P.3 mmHg  Ao V2 VTI: 18.3 cm  MICHELLE(I,D): 2.0 cm2  MICHELLE(V,D): 2.2 cm2  LV V1 max P.9 mmHg  LV V1 max: 68.9 cm/sec  LV V1 VTI: 12.0 cm  SV(LVOT): 37.2 ml  SI(LVOT): 23.7 ml/m2  PA V2 max: 65.2 cm/sec  PA max P.7 mmHg  PA acc time: 0.13 sec  PI end-d frank: 160.2 cm/sec  TR max frank: 322.8 cm/sec  TR max P.7 mmHg  AV Frank Ratio (DI): 0.70     MICHELLE Index (cm2/m2): 1.3  E/E' av.1  Lateral E/e': 32.3  Medial E/e': 35.8     _____________________________________________________________________________  __           Report approved by: Isabel Love 2018 09:49 AM            Assessment and Plan:    Luke is a 53 year old gentleman with a past medical history of ICM who appears hypervolemic today.  I have instructed him to increase his Bumex to 1 mg daily until his weight goes back to his baseline weight of 129 pounds, then he will decrease his Bumex to 0.5 mg daily.  We discussed increasing his Entresto and his Toprol-XL further for guideline directed medical therapy for heart failure.  He refused to increase his medications at this time.    I did recheck his blood pressure today. Manual blood pressure recheck showed a blood pressure of 130/78.  Again patient was unwilling to increase any of his antihypertensives.  We will continue to monitor at this point.    1.  Chronic systolic heart failure  secondary to ischemic cardiomyopathy.  Stage D NYHA Class II  ACEi/ARB: Entresto  49/51 mg twice daily.  BB: yes, Toprol  mg daily.   Aldosterone antagonist: Deferred due to worsening renal function with previous attempts.   SCD prophylaxis: ICD  Fluid status: Hypervolemic.  Diurese as outlined above.    Anticoagulation: None.   Antiplatelet:  ASA dose 81 mg daily.   Sleep apnea:  Not documented.  NSAID use:  Contraindicated.  Avoid use.  Remote Monitoring: None.   RV support:  Continue Digoxin. Last digoxin level was 0.6 in June 2018.    2.  CAD:  Stable.  Continue Plavix, ASA, Metoprolol, Lisinopril, and Atorvastatin        3.  Mitral valve repair s/p mitral clip:  Follow up with Dr. Amaral as scheduled in April 2019.       4. HTN: /78.  Continue Entresto.  Patient unwilling to increase Entresto at this time.    5. Hyperlipidemia: Last lipids on 10/22/18.  , Triglycerides 139, HDL 31, LDL 50.     6.  Follow up: Dr. Amaral in April with echo prior. [facility] two months.        Vandana VENEGAS, BRANDON  [facility]

## 2019-03-20 ENCOUNTER — PATIENT OUTREACH (OUTPATIENT)
Dept: CARDIOLOGY | Facility: CLINIC | Age: 54
End: 2019-03-20

## 2019-03-20 DIAGNOSIS — I25.5 ISCHEMIC CARDIOMYOPATHY: Primary | ICD-10-CM

## 2019-03-20 NOTE — PROGRESS NOTES
Tried calling Luke via  to check in and see how he's feeling since we saw him in clinic last week.   No answer and no voicemail. Will try again later.     Soraya Messina RN

## 2019-03-27 ENCOUNTER — PATIENT OUTREACH (OUTPATIENT)
Dept: CARDIOLOGY | Facility: CLINIC | Age: 54
End: 2019-03-27

## 2019-03-27 DIAGNOSIS — I25.5 ISCHEMIC CARDIOMYOPATHY: Primary | ICD-10-CM

## 2019-03-27 NOTE — PROGRESS NOTES
I called Luke to check in. When seen in clinic on 3/12 he was fluid up and was instructed to take 1 bumex tab twice daily until symptoms improve than go to twice daily. Difficult to assess over phone with  but he states he still feels like he has fluid all over his body and is still taking 2 tabs a day. Unsure if weight has gone down. I told him if he's still on this increased dose of bumex we should get some labs. He reports he cannot go to lab this week and will get labs next week when he sees his family doctor. Lab appt added on to check kidney function.     Soraya Messina RN     Date: 3/27/2019    Time of Call: 10:32 AM     Diagnosis:  ICM     [ VORB ] Ordering provider: Vandana Zambrano NP  Order: BMP     Order received by: Soraya Messina RN      Follow-up/additional notes:

## 2019-04-02 ENCOUNTER — OFFICE VISIT (OUTPATIENT)
Dept: INTERNAL MEDICINE | Facility: CLINIC | Age: 54
End: 2019-04-02
Payer: COMMERCIAL

## 2019-04-02 VITALS
WEIGHT: 135.7 LBS | BODY MASS INDEX: 24.82 KG/M2 | DIASTOLIC BLOOD PRESSURE: 86 MMHG | SYSTOLIC BLOOD PRESSURE: 124 MMHG | HEART RATE: 75 BPM | OXYGEN SATURATION: 99 % | RESPIRATION RATE: 16 BRPM

## 2019-04-02 DIAGNOSIS — Z79.4 TYPE 2 DIABETES MELLITUS WITHOUT COMPLICATION, WITH LONG-TERM CURRENT USE OF INSULIN (H): ICD-10-CM

## 2019-04-02 DIAGNOSIS — I25.5 ISCHEMIC CARDIOMYOPATHY: ICD-10-CM

## 2019-04-02 DIAGNOSIS — E11.9 TYPE 2 DIABETES MELLITUS WITHOUT COMPLICATION, WITH LONG-TERM CURRENT USE OF INSULIN (H): ICD-10-CM

## 2019-04-02 DIAGNOSIS — I50.23 ACUTE ON CHRONIC SYSTOLIC HEART FAILURE (H): Primary | ICD-10-CM

## 2019-04-02 DIAGNOSIS — N52.1 ERECTILE DYSFUNCTION DUE TO DISEASES CLASSIFIED ELSEWHERE: ICD-10-CM

## 2019-04-02 LAB
ANION GAP SERPL CALCULATED.3IONS-SCNC: 9 MMOL/L (ref 3–14)
BUN SERPL-MCNC: 31 MG/DL (ref 7–30)
CALCIUM SERPL-MCNC: 8.7 MG/DL (ref 8.5–10.1)
CHLORIDE SERPL-SCNC: 106 MMOL/L (ref 94–109)
CO2 SERPL-SCNC: 22 MMOL/L (ref 20–32)
CREAT SERPL-MCNC: 1.15 MG/DL (ref 0.66–1.25)
GFR SERPL CREATININE-BSD FRML MDRD: 73 ML/MIN/{1.73_M2}
GLUCOSE SERPL-MCNC: 140 MG/DL (ref 70–99)
HBA1C MFR BLD: 6.8 % (ref 0–5.6)
POTASSIUM SERPL-SCNC: 4.5 MMOL/L (ref 3.4–5.3)
SODIUM SERPL-SCNC: 137 MMOL/L (ref 133–144)

## 2019-04-02 PROCEDURE — 83036 HEMOGLOBIN GLYCOSYLATED A1C: CPT | Performed by: INTERNAL MEDICINE

## 2019-04-02 PROCEDURE — 36415 COLL VENOUS BLD VENIPUNCTURE: CPT | Performed by: INTERNAL MEDICINE

## 2019-04-02 PROCEDURE — 80048 BASIC METABOLIC PNL TOTAL CA: CPT | Performed by: INTERNAL MEDICINE

## 2019-04-02 PROCEDURE — 99214 OFFICE O/P EST MOD 30 MIN: CPT | Performed by: INTERNAL MEDICINE

## 2019-04-02 RX ORDER — SILDENAFIL 50 MG/1
50 TABLET, FILM COATED ORAL DAILY PRN
Qty: 20 TABLET | Refills: 3 | Status: SHIPPED | OUTPATIENT
Start: 2019-04-02 | End: 2020-01-09

## 2019-04-02 RX ORDER — SILDENAFIL 50 MG/1
50 TABLET, FILM COATED ORAL DAILY PRN
Qty: 20 TABLET | Refills: 3 | Status: SHIPPED | OUTPATIENT
Start: 2019-04-02 | End: 2019-04-02

## 2019-04-02 NOTE — PROGRESS NOTES
SUBJECTIVE:                                                      HPI: Luke Henao is a pleasant 53 year old male who presents for follow-up:     utilized.    Patient was seen by cardiology last month for follow-up. Thought to be volume up, so Bumex was increased from 0.5 mg to 1 mg daily. Patient has tolerated increased well - no adverse side effects. Patient reports being back to baseline. Even though his weight has not gone significantly since cardiology visit, he no longer feels swollen in the face. Patient attributes his weight gain to simply eating more than usual as of late.    Patient feels well today - no complaints. No chest pain or palpitations. No shortness of breath or cough.  o orthopnea or PND.    Patient does request a prescription for Viagra. Has difficulty achieving and maintaining an erection likely due to significant comorbidities including ischemic cardiomyopathy, CAD s/p multiple PCI, and diabetes.  He is not on any nitrate-containing medications.    Finally, patient is due for HgbA1c.    The medication, allergy, and problem lists have been reviewed and updated as appropriate.     OBJECTIVE:                                                      /86   Pulse 75   Resp 16   Wt 61.6 kg (135 lb 11.2 oz)   SpO2 99%   BMI 24.82 kg/m    Constitutional: well-appearing  Respiratory: normal respiratory effort; clear to auscultation bilaterally  Cardiovascular: regular rate and rhythm; no edema  Gastrointestinal: soft, non-tender, non-distended, and bowel sounds present; no organomegaly or masses   Musculoskeletal: normal gait and station  Psych: normal judgment and insight; normal mood and affect; recent and remote memory intact    ASSESSMENT/PLAN:                                                      (I50.23) Acute on chronic systolic heart failure (H)  (primary encounter diagnosis)  Comment: Patient reports feeling back to baseline (no longer with swollen face even though weight is  about the same).  Plan: BMP today; further recommendations per cardiology.    (E11.9,  Z79.4) Type 2 diabetes mellitus without complication, with long-term current use of insulin (H)  Plan: HgbA1c today.     (N52.1) Erectile dysfunction due to diseases classified elsewhere  Plan: Viagra prescribed - patient aware that this medication is not covered (do not  initiate PA).    The instructions on the AVS were discussed and explained to the patient. Patient expressed understanding of instructions.    (Chart documentation was completed, in part, with [employer] voice-recognition software. Even though reviewed, some grammatical, spelling, and word errors may remain.)    Shanna Church MD   [address] [address]  T: [phone], F: [phone]

## 2019-04-02 NOTE — LETTER
04/02/19      Luke Henao  [address]  [facility] [id number]        Dear ,    It was a pleasure seeing you again the other day! I hope this finds you well.    I wanted to let you know that your diabetes continues to be well controlled.     Please follow-up in 6 months for diabetic check.     Please feel free to contact me with any questions or concerns.      Take care,    Shanna Church MD   [address] [address]  T: [phone], F: [phone]

## 2019-04-03 ENCOUNTER — PATIENT OUTREACH (OUTPATIENT)
Dept: CARDIOLOGY | Facility: CLINIC | Age: 54
End: 2019-04-03

## 2019-04-03 NOTE — PROGRESS NOTES
Called Luke to review labs and check in on symptoms. Called via  line and no answer with no option to leave voicemail.   Per PCP note from yesterday patient is back to baseline and no longer feels so swollen in his face. Unsure if he is still taking 1 mg daily Bumex or if he has gone back down to 0.5 mg daily. Reviewed with Vandana Zambrano who recommends patient can stay on 1 mg daily Bumex and follow up as scheduled with cardiology later this month.   Will try to call back again to communicate this information.     Soraya Messina RN

## 2019-04-04 NOTE — PROGRESS NOTES
Called again with  to review labs. Reviewed labs with Luke. He reports he went back down to 1 tab daily this week. We reviewed that labs were stable and can increase back to the 1 mg daily if he felt like it helped him.  Will follow up in clinic later this month as scheduled with labs.     Soraya Messina RN

## 2019-04-10 DIAGNOSIS — I25.10 CORONARY ARTERY DISEASE INVOLVING NATIVE CORONARY ARTERY OF NATIVE HEART WITHOUT ANGINA PECTORIS: ICD-10-CM

## 2019-04-10 RX ORDER — DIGOXIN 125 MCG
TABLET ORAL
Qty: 60 TABLET | Refills: 5 | Status: SHIPPED | OUTPATIENT
Start: 2019-04-10 | End: 2019-10-17

## 2019-04-24 DIAGNOSIS — I25.5 ISCHEMIC CARDIOMYOPATHY: Primary | ICD-10-CM

## 2019-05-01 ENCOUNTER — OFFICE VISIT (OUTPATIENT)
Dept: CARDIOLOGY | Facility: CLINIC | Age: 54
End: 2019-05-01
Attending: INTERNAL MEDICINE
Payer: COMMERCIAL

## 2019-05-01 VITALS
OXYGEN SATURATION: 99 % | WEIGHT: 138.4 LBS | SYSTOLIC BLOOD PRESSURE: 131 MMHG | HEIGHT: 62 IN | DIASTOLIC BLOOD PRESSURE: 88 MMHG | BODY MASS INDEX: 25.47 KG/M2 | HEART RATE: 73 BPM

## 2019-05-01 DIAGNOSIS — I50.22 CHRONIC SYSTOLIC CONGESTIVE HEART FAILURE (H): ICD-10-CM

## 2019-05-01 DIAGNOSIS — Z98.890 HISTORY OF MITRAL VALVE REPAIR: ICD-10-CM

## 2019-05-01 DIAGNOSIS — I25.10 CORONARY ARTERY DISEASE INVOLVING NATIVE CORONARY ARTERY OF NATIVE HEART WITHOUT ANGINA PECTORIS: ICD-10-CM

## 2019-05-01 DIAGNOSIS — Z95.818 S/P MITRAL VALVE CLIP IMPLANTATION: ICD-10-CM

## 2019-05-01 DIAGNOSIS — I25.5 ISCHEMIC CARDIOMYOPATHY: ICD-10-CM

## 2019-05-01 DIAGNOSIS — Z98.890 S/P MITRAL VALVE CLIP IMPLANTATION: ICD-10-CM

## 2019-05-01 DIAGNOSIS — I63.30 CEREBROVASCULAR ACCIDENT (CVA) DUE TO THROMBOSIS OF CEREBRAL ARTERY (H): ICD-10-CM

## 2019-05-01 DIAGNOSIS — I34.0 MITRAL VALVE INSUFFICIENCY, UNSPECIFIED ETIOLOGY: ICD-10-CM

## 2019-05-01 DIAGNOSIS — I25.5 ISCHEMIC CARDIOMYOPATHY: Primary | ICD-10-CM

## 2019-05-01 DIAGNOSIS — E78.5 HYPERLIPIDEMIA LDL GOAL <100: ICD-10-CM

## 2019-05-01 LAB
ANION GAP SERPL CALCULATED.3IONS-SCNC: 8 MMOL/L (ref 3–14)
BUN SERPL-MCNC: 24 MG/DL (ref 7–30)
CALCIUM SERPL-MCNC: 9.1 MG/DL (ref 8.5–10.1)
CHLORIDE SERPL-SCNC: 104 MMOL/L (ref 94–109)
CO2 SERPL-SCNC: 26 MMOL/L (ref 20–32)
CREAT SERPL-MCNC: 1.12 MG/DL (ref 0.66–1.25)
GFR SERPL CREATININE-BSD FRML MDRD: 75 ML/MIN/{1.73_M2}
GLUCOSE SERPL-MCNC: 131 MG/DL (ref 70–99)
POTASSIUM SERPL-SCNC: 4.2 MMOL/L (ref 3.4–5.3)
SODIUM SERPL-SCNC: 137 MMOL/L (ref 133–144)

## 2019-05-01 PROCEDURE — 99214 OFFICE O/P EST MOD 30 MIN: CPT | Mod: ZP | Performed by: INTERNAL MEDICINE

## 2019-05-01 PROCEDURE — G0463 HOSPITAL OUTPT CLINIC VISIT: HCPCS | Mod: ZF

## 2019-05-01 PROCEDURE — T1013 SIGN LANG/ORAL INTERPRETER: HCPCS | Mod: U3,ZF

## 2019-05-01 PROCEDURE — 80048 BASIC METABOLIC PNL TOTAL CA: CPT | Performed by: NURSE PRACTITIONER

## 2019-05-01 PROCEDURE — 36415 COLL VENOUS BLD VENIPUNCTURE: CPT | Performed by: NURSE PRACTITIONER

## 2019-05-01 RX ORDER — METOPROLOL SUCCINATE 200 MG/1
200 TABLET, EXTENDED RELEASE ORAL DAILY
Qty: 30 TABLET | Refills: 11 | Status: SHIPPED | OUTPATIENT
Start: 2019-05-01 | End: 2019-09-03

## 2019-05-01 ASSESSMENT — PAIN SCALES - GENERAL: PAINLEVEL: NO PAIN (0)

## 2019-05-01 ASSESSMENT — MIFFLIN-ST. JEOR: SCORE: 1362.03

## 2019-05-01 NOTE — PROGRESS NOTES
CARDIOLOGY RETURN VISIT    HPI: Luke Henao is a 53 year old male being seen today for follow up of ischemic cardiomyopathy status post inferior STEMI in 2017 that was complicated with cardiac genic shock and mitral regurgitation.  Following heart attack in March 2017 he had mitral clip placed in May 2017 and ICD placed in October 2017.  Since then done very well during follow-up with [facility].  He has tolerated up titration of medications and been started on Entresto which he believes has made him feel stronger.  Now doing very well with only complaint of leg swelling that he has associated with working 10 hours and not using stocking. If he uses compression socks there is no swelling.  Denies any dizziness at home or at work.  No chest pain.  Blood pressure at home tends to be low around 100 and surprised that his blood pressure is elevated today.  The patient denies a history of chest discomfort, dyspnea, PND (paroxysmal nocturnal dyspnea), orthopnea, palpitations, lightheadedness and syncope.    Able to walk >2 flight of stairs (FOS) without stopping at normal or fast pace.     The ASCVD Risk score (Waverly DC Jr., et al., 2013) failed to calculate for the following reasons:    The patient has a prior MI or stroke diagnosis    PAST MEDICAL HISTORY:  Past Medical History:   Diagnosis Date     CAD (coronary artery disease) 2017    RCA STEMI s/p balloon angioplasty and multiple Sofie to RCA, LCx, and LAD     DVT (deep venous thrombosis) (H) 2017    left internal jugular (line-associated); left common femoral; on coumadin     Ischemic cardiomyopathy 2017    EF 30-35%     Ischemic stroke (H) 2017    no residual deficits     Lower GI bleed 2017    due to rectal ulcer     Mitral valve insufficiency, ischemic 2017    s/p Mitral Clip placement      Type 2 diabetes mellitus (H)        CURRENT MEDICATIONS:  Current Outpatient Medications   Medication Sig Dispense Refill     aspirin 81 MG chewable tablet Take 1 tablet (81  mg) by mouth daily 90 tablet 3     atorvastatin (LIPITOR) 40 MG tablet Take 1 tablet (40 mg) by mouth daily 60 tablet 5     bumetanide (BUMEX) 0.5 MG tablet Take 2 tabs daily until weight down 3 lbs. Then reduce dose to 0.5 mg daily. 45 tablet 3     cetirizine (ZYRTEC) 10 MG tablet TAKE 1 TABLET (10 MG) BY MOUTH DAILY 60 tablet 10     digoxin (LANOXIN) 125 MCG tablet Take one tablet on M, W, F, Sunday. 60 tablet 5     glipiZIDE (GLUCOTROL) 5 MG tablet TAKE 1 TABLET (5 MG) BY MOUTH 2 TIMES DAILY (BEFORE MEALS) 180 tablet 3     metFORMIN (GLUCOPHAGE-XR) 500 MG 24 hr tablet Take 2 tablets (1,000 mg) by mouth daily (with dinner) 180 tablet 3     metoprolol succinate ER (TOPROL-XL) 50 MG 24 hr tablet Take 3 tablets (150 mg) by mouth daily 180 tablet 3     ONETOUCH ULTRA test strip USE TO TEST BLOOD SUGAR 3 TIMES DAILY OR AS DIRECTED. 100 strip 3     sacubitril-valsartan (ENTRESTO) 49-51 MG per tablet Take 1 tablet by mouth 2 times daily 180 tablet 3     sildenafil (VIAGRA) 50 MG tablet Take 1 tablet (50 mg) by mouth daily as needed (30 minutes prior to intercourse) 20 tablet 3       PAST SURGICAL HISTORY:  Past Surgical History:   Procedure Laterality Date     C INSERT ELECTRD LEADS/REPOSTION  10/27/2017          COLONOSCOPY N/A 4/17/2017    Procedure: COLONOSCOPY;  Surgeon: Rashaad Bundy MD;  [facility]: [facility]     INSERT INTRAAORTIC BALLOON PUMP Right 4/19/2017    Procedure: INSERT INTRAAORTIC BALLOON PUMP;  Right Subclavian Intra Aortic Balloon Pump Insertion using Maquet 40cc Ballon Catheter, Implentation of 8mm Gelweave Woven Vascular Prosthesis, Removal of Left Femoral Ballon Pump Catheter, Flouroscopy;  Surgeon: Keshav Leung MD;  [facility]     PERCUTANEOUS MITRAL VALVE REPAIR N/A 5/1/2017    Procedure: PERCUTANEOUS MITRAL VALVE REPAIR ANESTHESIA;  Mitraclip Procedure Possible Cardiopulmonary Bypass ;  Surgeon: Ron Cortez MD;  [facility]     SUBCLAVIAN AORTIC VALVE IMPLANT  N/A 2017    Procedure: SUBCLAVIAN AORTIC VALVE IMPLANT;  Right Subclavian Graft Removal ;  Surgeon: Keshav Leung MD;  [facility]       ALLERGIES  Seasonal allergies    FAMILY HX:  Family History   Problem Relation Age of Onset     Hypertension Mother      Diabetes Type 2  Father      Hypertension Father      Myocardial Infarction No family hx of      Cerebrovascular Disease No family hx of      Coronary Artery Disease Early Onset No family hx of      Colon Cancer No family hx of      Prostate Cancer No family hx of        SOCIAL HX:  Social History     Socioeconomic History     Marital status:      Spouse name: None     Number of children: None     Years of education: None     Highest education level: None   Occupational History     Occupation: [employer]   Social Needs     Financial resource strain: None     Food insecurity:     Worry: None     Inability: None     Transportation needs:     Medical: None     Non-medical: None   Tobacco Use     Smoking status: Former Smoker     Packs/day: 0.50     Years: 30.00     Pack years: 15.00     Types: Cigarettes     Last attempt to quit: 2017     Years since quittin.3     Smokeless tobacco: Never Used   Substance and Sexual Activity     Alcohol use: No     Drug use: No     Sexual activity: Not Currently   Lifestyle     Physical activity:     Days per week: None     Minutes per session: None     Stress: None   Relationships     Social connections:     Talks on phone: None     Gets together: None     Attends Christian service: None     Active member of club or organization: None     Attends meetings of clubs or organizations: None     Relationship status: None     Intimate partner violence:     Fear of current or ex partner: None     Emotionally abused: None     Physically abused: None     Forced sexual activity: None   Other Topics Concern     Parent/sibling w/ CABG, MI or angioplasty before 65F 55M? Not Asked   Social History  "Narrative    . Remarried.    4 children with first marriage.    2 grand children.    Walks daily, but no formal exercise.        ROS:  Constitutional: No recent fever, chills, or sweats. No significant weight gain/loss.   ENT: No epistaxis.  Allergies/Immunologic: As above.   Respiratory: No hemoptysis.   Cardiovascular: As per HPI.   GI: No hematemesis, melena, or hematochezia.   : No hematuria.   Skin: No petechia or ecchymosis.   Endocrinology: Positive for diabetes  Musculoskeletal: No myalgia.    VITAL SIGNS:  /88 (BP Location: Left arm, Patient Position: Chair, Cuff Size: Adult Regular)   Pulse 73   Ht 1.575 m (5' 2\")   Wt 62.8 kg (138 lb 6.4 oz)   SpO2 99%   BMI 25.31 kg/m      Body mass index is 25.31 kg/m .  Wt Readings from Last 2 Encounters:   05/01/19 62.8 kg (138 lb 6.4 oz)   04/02/19 61.6 kg (135 lb 11.2 oz)       PHYSICAL EXAM  Luke Henao is a 53 year old male in no acute distress.  HEENT: Unremarkable.  Neck: JVP normal.  Carotids bilaterally without bruits.  Lungs: CTA.  Cor: RRR. Normal S1 and S2.  No murmur, rub, or gallop.   Abd: Soft, nontender, nondistended.  NABS.  No pulsatile mass.  Extremities: No C/C/E.  Pulses +symmetric in upper and lower extremities.  Neuro: Grossly intact.    LABS    Lab Results   Component Value Date    WBC 10.7 10/01/2018     Lab Results   Component Value Date    RBC 4.97 10/01/2018     Lab Results   Component Value Date    HGB 14.9 10/01/2018     Lab Results   Component Value Date    HCT 46.3 10/01/2018     No components found for: MCT  Lab Results   Component Value Date    MCV 93 10/01/2018     Lab Results   Component Value Date    MCH 30.0 10/01/2018     Lab Results   Component Value Date    MCHC 32.2 10/01/2018     Lab Results   Component Value Date    RDW 14.6 10/01/2018     Lab Results   Component Value Date     10/01/2018      Recent Labs   Lab Test 05/01/19  0849 04/02/19  1113    137   POTASSIUM 4.2 4.5   CHLORIDE 104 106 "   CO2 26 22   ANIONGAP 8 9   * 140*   BUN 24 31*   CR 1.12 1.15   ROB 9.1 8.7     Recent Labs   Lab Test 10/22/18  0909 10/01/18  1018   CHOL 109 135   HDL 31* 31*   LDL 50 74   TRIG 139 148   NHDL 78 104        EK19 with SR and inferior Q wave     ECHO: 19  Interpretation Summary  Severely (EF 20-25%) reduced left ventricular function is present. Severe  diffuse hypokinesis is present.  Right ventricular function, chamber size, wall motion, and thickness are  normal.  S/P MitraClip with trace residual MR and no significant stenosis (MG 2.5 mmHg  at 72 bpm.)  Moderate tricuspid insufficiency is present.  Right ventricular systolic pressure is 41mmHg above the right atrial pressure.  The inferior vena cava was normal in size with preserved respiratory  variability.  This study was compared to a prior TTE from 2018. LVEF has decreased  slightly.        ASSESSMENT AND PLAN:    1. Ischemic cardiomyopathy    2. Cerebrovascular accident (CVA) due to thrombosis of cerebral artery (H)    3. History of mitral valve repair with mitral clip 2017      53-year-old male with ischemic cardiomyopathy status post acute myocardial infarction in 2017 that was complicated with cardiogenic shock and mitral regurgitation.  Also with mitral clip and ICD.  [facility]ed [facility], is working 10-hour days and with minimal symptoms.    Echocardiogram today shows left ventricular hypertrophy, with normal left ventricular size and severe left ventricular systolic dysfunction.  Mitral regurgitation is minimal with normal mitral valve gradient.  Right ventricular function also appears mildly decreased.  IVC is not dilated and collapses with inspiration.  Reviewed echocardiogram with patient and advised him that heart function has not improved much and blood pressure is somewhat elevated today.  Advised that I would prefer to increase his metoprolol succinate to 200 mg daily with the expectation that he will not notice much  change.  Advised him though that he could develop some fatigue for the first 6 to 8 weeks.  Advised that if he has frequent dizzy spells we will have to decrease the dose to 150 mg.    Patient planning to visit with his son in [place] for 2 weeks later in May and advised that he would be safe to do this.  Also wants to go to the [facility] to visit with family and advised that he should wait with that for at least 4 to 6 weeks or until he is tolerating his metoprolol dose succinate increase.    Follow up: 6 months with EKG and basic metabolic panel.  Follow-up with [facility] in 2 months.      Amanda Amaral MD    Division of Cardiology  [facility]    [facility]  [address] [place] [id number] [phone] Appointments  [phone] Fax  [phone] After hours    Clinic nurse:  Agustin Antony LPN   Nurse Care Coordinator- Heart Care   [phone] option 1, than option 3    Academic Mailing address:  [facility]  Department of Internal Medicine [facility] [phone]) [phone] [place] [address]

## 2019-05-01 NOTE — PROGRESS NOTES
Labs: Patient was given results of the laboratory testing obtained today. Patient was instructed to return for the next laboratory testing in 6 months . Patient demonstrated understanding of this information and agreed to call with further questions or concerns.   Med Reconcile: Reviewed and verified all current medications with the patient. The updated medication list was printed and given to the patient.  Return Appointment: Patient given instructions regarding scheduling next clinic visit. Patient demonstrated understanding of this information and agreed to call with further questions or concerns.  Patient stated he understood all health information given and agreed to call with further questions or concerns.

## 2019-05-01 NOTE — LETTER
5/1/2019      RE: Luke Henao  [address]  [facility] [address]       Dear Colleague,    Thank you for the opportunity to participate in the care of your patient, Luke Henao, at the [facility] at [facility]. Please see a copy of my visit note below.        CARDIOLOGY RETURN VISIT    HPI: Luke Henao is a 53 year old male being seen today for follow up of ischemic cardiomyopathy status post inferior STEMI in 2017 that was complicated with cardiac genic shock and mitral regurgitation.  Following heart attack in March 2017 he had mitral clip placed in May 2017 and ICD placed in October 2017.  Since then done very well during follow-up with [facility].  He has tolerated up titration of medications and been started on Entresto which he believes has made him feel stronger.  Now doing very well with only complaint of leg swelling that he has associated with working 10 hours and not using stocking. If he uses compression socks there is no swelling.  Denies any dizziness at home or at work.  No chest pain.  Blood pressure at home tends to be low around 100 and surprised that his blood pressure is elevated today.  The patient denies a history of chest discomfort, dyspnea, PND (paroxysmal nocturnal dyspnea), orthopnea, palpitations, lightheadedness and syncope.    Able to walk >2 flight of stairs (FOS) without stopping at normal or fast pace.     The ASCVD Risk score (Severo GABBI Jr., et al., 2013) failed to calculate for the following reasons:    The patient has a prior MI or stroke diagnosis    PAST MEDICAL HISTORY:  Past Medical History:   Diagnosis Date     CAD (coronary artery disease) 2017    RCA STEMI s/p balloon angioplasty and multiple Sofie to RCA, LCx, and LAD     DVT (deep venous thrombosis) (H) 2017    left internal jugular (line-associated); left common femoral; on coumadin     Ischemic cardiomyopathy 2017    EF 30-35%     Ischemic stroke (H) 2017    no residual  deficits     Lower GI bleed 2017    due to rectal ulcer     Mitral valve insufficiency, ischemic 2017    s/p Mitral Clip placement      Type 2 diabetes mellitus (H)        CURRENT MEDICATIONS:  Current Outpatient Medications   Medication Sig Dispense Refill     aspirin 81 MG chewable tablet Take 1 tablet (81 mg) by mouth daily 90 tablet 3     atorvastatin (LIPITOR) 40 MG tablet Take 1 tablet (40 mg) by mouth daily 60 tablet 5     bumetanide (BUMEX) 0.5 MG tablet Take 2 tabs daily until weight down 3 lbs. Then reduce dose to 0.5 mg daily. 45 tablet 3     cetirizine (ZYRTEC) 10 MG tablet TAKE 1 TABLET (10 MG) BY MOUTH DAILY 60 tablet 10     digoxin (LANOXIN) 125 MCG tablet Take one tablet on M, W, F, Sunday. 60 tablet 5     glipiZIDE (GLUCOTROL) 5 MG tablet TAKE 1 TABLET (5 MG) BY MOUTH 2 TIMES DAILY (BEFORE MEALS) 180 tablet 3     metFORMIN (GLUCOPHAGE-XR) 500 MG 24 hr tablet Take 2 tablets (1,000 mg) by mouth daily (with dinner) 180 tablet 3     metoprolol succinate ER (TOPROL-XL) 50 MG 24 hr tablet Take 3 tablets (150 mg) by mouth daily 180 tablet 3     ONETOUCH ULTRA test strip USE TO TEST BLOOD SUGAR 3 TIMES DAILY OR AS DIRECTED. 100 strip 3     sacubitril-valsartan (ENTRESTO) 49-51 MG per tablet Take 1 tablet by mouth 2 times daily 180 tablet 3     sildenafil (VIAGRA) 50 MG tablet Take 1 tablet (50 mg) by mouth daily as needed (30 minutes prior to intercourse) 20 tablet 3       PAST SURGICAL HISTORY:  Past Surgical History:   Procedure Laterality Date     C INSERT ELECTRD LEADS/REPOSTION  10/27/2017          COLONOSCOPY N/A 4/17/2017    Procedure: COLONOSCOPY;  Surgeon: Rashaad Bundy MD;  [facility]     INSERT INTRAAORTIC BALLOON PUMP Right 4/19/2017    Procedure: INSERT INTRAAORTIC BALLOON PUMP;  Right Subclavian Intra Aortic Balloon Pump Insertion using Maquet 40cc Ballon Catheter, Implentation of 8mm Gelweave Woven Vascular Prosthesis, Removal of Left Femoral Ballon Pump Catheter, Flouroscopy;   Surgeon: Keshav Leung MD;  [facility]     PERCUTANEOUS MITRAL VALVE REPAIR N/A 2017    Procedure: PERCUTANEOUS MITRAL VALVE REPAIR ANESTHESIA;  Mitraclip Procedure Possible Cardiopulmonary Bypass ;  Surgeon: Ron Cortez MD;  [facility]     SUBCLAVIAN AORTIC VALVE IMPLANT N/A 2017    Procedure: SUBCLAVIAN AORTIC VALVE IMPLANT;  Right Subclavian Graft Removal ;  Surgeon: Keshav Leung MD;  [facility]       ALLERGIES  Seasonal allergies    FAMILY HX:  Family History   Problem Relation Age of Onset     Hypertension Mother      Diabetes Type 2  Father      Hypertension Father      Myocardial Infarction No family hx of      Cerebrovascular Disease No family hx of      Coronary Artery Disease Early Onset No family hx of      Colon Cancer No family hx of      Prostate Cancer No family hx of        SOCIAL HX:  Social History     Socioeconomic History     Marital status:      Spouse name: None     Number of children: None     Years of education: None     Highest education level: None   Occupational History     Occupation: [employer]   Social Needs     Financial resource strain: None     Food insecurity:     Worry: None     Inability: None     Transportation needs:     Medical: None     Non-medical: None   Tobacco Use     Smoking status: Former Smoker     Packs/day: 0.50     Years: 30.00     Pack years: 15.00     Types: Cigarettes     Last attempt to quit: 2017     Years since quittin.3     Smokeless tobacco: Never Used   Substance and Sexual Activity     Alcohol use: No     Drug use: No     Sexual activity: Not Currently   Lifestyle     Physical activity:     Days per week: None     Minutes per session: None     Stress: None   Relationships     Social connections:     Talks on phone: None     Gets together: None     Attends Caodaism service: None     Active member of club or organization: None     Attends meetings of clubs or organizations:  "None     Relationship status: None     Intimate partner violence:     Fear of current or ex partner: None     Emotionally abused: None     Physically abused: None     Forced sexual activity: None   Other Topics Concern     Parent/sibling w/ CABG, MI or angioplasty before 65F 55M? Not Asked   Social History Narrative    . Remarried.    4 children with first marriage.    2 grand children.    Walks daily, but no formal exercise.        ROS:  Constitutional: No recent fever, chills, or sweats. No significant weight gain/loss.   ENT: No epistaxis.  Allergies/Immunologic: As above.   Respiratory: No hemoptysis.   Cardiovascular: As per HPI.   GI: No hematemesis, melena, or hematochezia.   : No hematuria.   Skin: No petechia or ecchymosis.   Endocrinology: Positive for diabetes  Musculoskeletal: No myalgia.    VITAL SIGNS:  /88 (BP Location: Left arm, Patient Position: Chair, Cuff Size: Adult Regular)   Pulse 73   Ht 1.575 m (5' 2\")   Wt 62.8 kg (138 lb 6.4 oz)   SpO2 99%   BMI 25.31 kg/m       Body mass index is 25.31 kg/m .  Wt Readings from Last 2 Encounters:   05/01/19 62.8 kg (138 lb 6.4 oz)   04/02/19 61.6 kg (135 lb 11.2 oz)       PHYSICAL EXAM  Luke Henao is a 53 year old male in no acute distress.  HEENT: Unremarkable.  Neck: JVP normal.  Carotids bilaterally without bruits.  Lungs: CTA.  Cor: RRR. Normal S1 and S2.  No murmur, rub, or gallop.   Abd: Soft, nontender, nondistended.  NABS.  No pulsatile mass.  Extremities: No C/C/E.  Pulses +symmetric in upper and lower extremities.  Neuro: Grossly intact.    LABS    Lab Results   Component Value Date    WBC 10.7 10/01/2018     Lab Results   Component Value Date    RBC 4.97 10/01/2018     Lab Results   Component Value Date    HGB 14.9 10/01/2018     Lab Results   Component Value Date    HCT 46.3 10/01/2018     No components found for: MCT  Lab Results   Component Value Date    MCV 93 10/01/2018     Lab Results   Component Value Date    MCH 30.0 " 10/01/2018     Lab Results   Component Value Date    MCHC 32.2 10/01/2018     Lab Results   Component Value Date    RDW 14.6 10/01/2018     Lab Results   Component Value Date     10/01/2018      Recent Labs   Lab Test 19  0849 19  1113    137   POTASSIUM 4.2 4.5   CHLORIDE 104 106   CO2 26 22   ANIONGAP 8 9   * 140*   BUN 24 31*   CR 1.12 1.15   ROB 9.1 8.7     Recent Labs   Lab Test 10/22/18  0909 10/01/18  1018   CHOL 109 135   HDL 31* 31*   LDL 50 74   TRIG 139 148   NHDL 78 104        EK19 with SR and inferior Q wave     ECHO: 19  Interpretation Summary  Severely (EF 20-25%) reduced left ventricular function is present. Severe  diffuse hypokinesis is present.  Right ventricular function, chamber size, wall motion, and thickness are  normal.  S/P MitraClip with trace residual MR and no significant stenosis (MG 2.5 mmHg  at 72 bpm.)  Moderate tricuspid insufficiency is present.  Right ventricular systolic pressure is 41mmHg above the right atrial pressure.  The inferior vena cava was normal in size with preserved respiratory  variability.  This study was compared to a prior TTE from 2018. LVEF has decreased  slightly.        ASSESSMENT AND PLAN:    1. Ischemic cardiomyopathy    2. Cerebrovascular accident (CVA) due to thrombosis of cerebral artery (H)    3. History of mitral valve repair with mitral clip       53-year-old male with ischemic cardiomyopathy status post acute myocardial infarction in 2017 that was complicated with cardiogenic shock and mitral regurgitation.  Also with mitral clip and ICD.  Followed [facility], is working 10-hour days and with minimal symptoms.    Echocardiogram today shows left ventricular hypertrophy, with normal left ventricular size and severe left ventricular systolic dysfunction.  Mitral regurgitation is minimal with normal mitral valve gradient.  Right ventricular function also appears mildly decreased.  IVC is not dilated  and collapses with inspiration.  Reviewed echocardiogram with patient and advised him that heart function has not improved much and blood pressure is somewhat elevated today.  Advised that I would prefer to increase his metoprolol succinate to 200 mg daily with the expectation that he will not notice much change.  Advised him though that he could develop some fatigue for the first 6 to 8 weeks.  Advised that if he has frequent dizzy spells we will have to decrease the dose to 150 mg.    Patient planning to visit with his son in [place] for 2 weeks later in May and advised that he would be safe to do this.  Also wants to go to the [facility] to visit with family and advised that he should wait with that for at least 4 to 6 weeks or until he is tolerating his metoprolol dose succinate increase.    Follow up: 6 months with EKG and basic metabolic panel.  Follow-up with [facility] in 2 months.      Amanda Amaral MD    Division of Cardiology  [facility]  [address] [place] [address] [phone] Appointments  [phone] Fax  [phone] After hours    Clinic nurse:  Agustin Antony LPN   Nurse Care Coordinator- Heart Care   [phone] option 1, than option 3    Academic Mailing address:  [facility]  Department of Internal Medicine [facility] [phone]) [address] [place] [address]              Labs: Patient was given results of the laboratory testing obtained today. Patient was instructed to return for the next laboratory testing in 6 months . Patient demonstrated understanding of this information and agreed to call with further questions or concerns.   Med Reconcile: Reviewed and verified all current medications with the patient. The updated medication list was printed and given to the patient.  Return Appointment: Patient given instructions regarding scheduling next clinic visit. Patient demonstrated  understanding of this information and agreed to call with further questions or concerns.  Patient stated he understood all health information given and agreed to call with further questions or concerns.      Please do not hesitate to contact me if you have any questions/concerns.     Sincerely,     Amanda Amaral MD

## 2019-05-01 NOTE — PATIENT INSTRUCTIONS
You were seen today in the Cardiovascular Clinic at the [facility].     Cardiology Providers you saw during your visit: Dr. Amaral     Diagnosis:   Encounter Diagnoses   Name Primary?     Ischemic cardiomyopathy Yes     Cerebrovascular accident (CVA) due to thrombosis of cerebral artery (H)      History of mitral valve repair with mitral clip 2017      Coronary artery disease involving native coronary artery of native heart without angina pectoris      Mitral valve insufficiency, unspecified etiology      S/P mitral valve clip implantation      Hyperlipidemia LDL goal <100      Chronic systolic congestive heart failure (H)         Results: Discussed with you today echo      Orders:   Orders Placed This Encounter   Procedures     Basic metabolic panel     Follow-Up with Cardiologist     EKG 12-lead, tracing only       Current Medication List  Current Outpatient Medications   Medication Sig Dispense Refill     aspirin 81 MG chewable tablet Take 1 tablet (81 mg) by mouth daily 90 tablet 3     atorvastatin (LIPITOR) 40 MG tablet Take 1 tablet (40 mg) by mouth daily 60 tablet 5     bumetanide (BUMEX) 0.5 MG tablet Take 2 tabs daily until weight down 3 lbs. Then reduce dose to 0.5 mg daily. 45 tablet 3     cetirizine (ZYRTEC) 10 MG tablet TAKE 1 TABLET (10 MG) BY MOUTH DAILY 60 tablet 10     digoxin (LANOXIN) 125 MCG tablet Take one tablet on M, W, F, Sunday. 60 tablet 5     glipiZIDE (GLUCOTROL) 5 MG tablet TAKE 1 TABLET (5 MG) BY MOUTH 2 TIMES DAILY (BEFORE MEALS) 180 tablet 3     metFORMIN (GLUCOPHAGE-XR) 500 MG 24 hr tablet Take 2 tablets (1,000 mg) by mouth daily (with dinner) 180 tablet 3     metoprolol succinate ER (TOPROL-XL) 200 MG 24 hr tablet Take 1 tablet (200 mg) by mouth daily 30 tablet 11     ONETOUCH ULTRA test strip USE TO TEST BLOOD SUGAR 3 TIMES DAILY OR AS DIRECTED. 100 strip 3     sacubitril-valsartan (ENTRESTO) 49-51 MG per tablet Take 1 tablet by mouth 2 times daily 180 tablet 3      "sildenafil (VIAGRA) 50 MG tablet Take 1 tablet (50 mg) by mouth daily as needed (30 minutes prior to intercourse) 20 tablet 3         Medications Discontinued:  Medications Discontinued During This Encounter   Medication Reason     metoprolol succinate ER (TOPROL-XL) 50 MG 24 hr tablet Reorder         Recommendations:   1. Increase metoprolol succinate to 200 mg daily  2. Observe diet  3. Follow up CORE clinic in 2 months    Follow-up: 6 months with labs/EKG         Please feel free to call me with any questions or concerns.       Agustin Antony LPN      Questions: [phone].   First press #1 for the [employer] and then press #3 for \"Medical Questions\" to reach us Cardiology Nurses.     [phone].   First press #1 for the [employer] and then press #1      On Call Cardiologist for after hours or on weekends: [phone]   option #4 and ask to speak to the on-call Cardiologist.          If you need a medication refill please contact your pharmacy.  Please allow 3 business days for your refill to be completed.  ________________________________________________________________________________________________________________________________        "

## 2019-06-15 ENCOUNTER — OFFICE VISIT (OUTPATIENT)
Dept: URGENT CARE | Facility: URGENT CARE | Age: 54
End: 2019-06-15
Payer: COMMERCIAL

## 2019-06-15 VITALS
BODY MASS INDEX: 25.39 KG/M2 | HEART RATE: 75 BPM | DIASTOLIC BLOOD PRESSURE: 80 MMHG | TEMPERATURE: 97.8 F | OXYGEN SATURATION: 96 % | SYSTOLIC BLOOD PRESSURE: 123 MMHG | WEIGHT: 138.8 LBS

## 2019-06-15 DIAGNOSIS — M25.531 RIGHT WRIST PAIN: Primary | ICD-10-CM

## 2019-06-15 PROCEDURE — 99214 OFFICE O/P EST MOD 30 MIN: CPT | Performed by: FAMILY MEDICINE

## 2019-06-15 RX ORDER — METHYLPREDNISOLONE 4 MG
TABLET, DOSE PACK ORAL
Qty: 21 TABLET | Refills: 0 | Status: SHIPPED | OUTPATIENT
Start: 2019-06-15 | End: 2019-06-25

## 2019-06-15 RX ORDER — HYDROCODONE BITARTRATE AND ACETAMINOPHEN 5; 325 MG/1; MG/1
1 TABLET ORAL EVERY 6 HOURS PRN
Qty: 8 TABLET | Refills: 0 | Status: SHIPPED | OUTPATIENT
Start: 2019-06-15 | End: 2019-06-25

## 2019-06-15 NOTE — PROGRESS NOTES
SUBJECTIVE:  Chief Complaint   Patient presents with     Urgent Care     Musculoskeletal Problem     Pt states right wrist pain sxs    .logant who presents with a chief complaint of  right wrist pain.  Symptoms began 3 week(s) ago , are moderate andstill present.  Context:Injury: no  Pain exacerbated by movement   Relieved bynothing He treated it initially with Tylenol.   This is the first time this type of problem has occurred to this patient.     Past Medical History:   Diagnosis Date     CAD (coronary artery disease) 2017    RCA STEMI s/p balloon angioplasty and multiple Sofie to RCA, LCx, and LAD     DVT (deep venous thrombosis) (H) 2017    left internal jugular (line-associated); left common femoral; on coumadin     Ischemic cardiomyopathy 2017    EF 30-35%     Ischemic stroke (H) 2017    no residual deficits     Lower GI bleed 2017    due to rectal ulcer     Mitral valve insufficiency, ischemic 2017    s/p Mitral Clip placement      Type 2 diabetes mellitus (H)        Past Surgical History:   Procedure Laterality Date     C INSERT ELECTRD LEADS/REPOSTION  10/27/2017          COLONOSCOPY N/A 4/17/2017    Procedure: COLONOSCOPY;  Surgeon: Rashaad Bundy MD;  Location: [facility]     INSERT INTRAAORTIC BALLOON PUMP Right 4/19/2017    Procedure: INSERT INTRAAORTIC BALLOON PUMP;  Right Subclavian Intra Aortic Balloon Pump Insertion using Maquet 40cc Ballon Catheter, Implentation of 8mm Gelweave Woven Vascular Prosthesis, Removal of Left Femoral Ballon Pump Catheter, Flouroscopy;  Surgeon: Keshav Leung MD;  Location: [facility] OR     PERCUTANEOUS MITRAL VALVE REPAIR N/A 5/1/2017    Procedure: PERCUTANEOUS MITRAL VALVE REPAIR ANESTHESIA;  Mitraclip Procedure Possible Cardiopulmonary Bypass ;  Surgeon: Ron Cortez MD;  Location: [facility] OR     SUBCLAVIAN AORTIC VALVE IMPLANT N/A 5/8/2017    Procedure: SUBCLAVIAN AORTIC VALVE IMPLANT;  Right Subclavian Graft Removal ;  Surgeon: Keshav Leung MD;   Location: [facility] OR       Family History   Problem Relation Age of Onset     Hypertension Mother      Diabetes Type 2  Father      Hypertension Father      Myocardial Infarction No family hx of      Cerebrovascular Disease No family hx of      Coronary Artery Disease Early Onset No family hx of      Colon Cancer No family hx of      Prostate Cancer No family hx of        Social History     Tobacco Use     Smoking status: Former Smoker     Packs/day: 0.50     Years: 30.00     Pack years: 15.00     Types: Cigarettes     Last attempt to quit: 2017     Years since quittin.4     Smokeless tobacco: Never Used   Substance Use Topics     Alcohol use: No        Allergies   Allergen Reactions     Seasonal Allergies Difficulty breathing       ROS:Review of systems negative except as stated below    EXAM: /80   Pulse 75   Temp 97.8  F (36.6  C) (Oral)   Wt 63 kg (138 lb 12.8 oz)   SpO2 96%   BMI 25.39 kg/m     Exam:wrist  tenderness to palpation and pain with rom  GENERAL APPEARANCE: healthy, alert and no distress  EXTREMITIES: peripheral pulses normal  SKIN: no suspicious lesions or rashes  NEURO: Normal strength and tone, sensory exam grossly normal, mentation intact and speech normal    ASSESSMENT:   (M25.531) Right wrist pain  (primary encounter diagnosis)  Comment: suspect gout or overuse, doubt fx as no trauma  Plan: methylPREDNISolone (MEDROL DOSEPAK) 4 MG tablet        therapy pack, order for DME,         HYDROcodone-acetaminophen (NORCO) 5-325 MG         tablet        Ice  F/U recheck

## 2019-06-17 PROBLEM — Z79.01 LONG TERM CURRENT USE OF ANTICOAGULANT THERAPY: Status: ACTIVE | Noted: 2017-10-03

## 2019-06-25 ENCOUNTER — OFFICE VISIT (OUTPATIENT)
Dept: INTERNAL MEDICINE | Facility: CLINIC | Age: 54
End: 2019-06-25
Payer: COMMERCIAL

## 2019-06-25 VITALS
WEIGHT: 136 LBS | OXYGEN SATURATION: 98 % | SYSTOLIC BLOOD PRESSURE: 118 MMHG | DIASTOLIC BLOOD PRESSURE: 80 MMHG | HEART RATE: 67 BPM | BODY MASS INDEX: 24.87 KG/M2

## 2019-06-25 DIAGNOSIS — M65.4 TENOSYNOVITIS, DE QUERVAIN: Primary | ICD-10-CM

## 2019-06-25 PROCEDURE — 99213 OFFICE O/P EST LOW 20 MIN: CPT | Performed by: INTERNAL MEDICINE

## 2019-06-25 NOTE — PROGRESS NOTES
SUBJECTIVE:                                                      HPI: Luke Henao is a pleasant 53 year old male who presents for urgent care follow-up:    [employer]  utilized.    Patient was seen in UC a couple weeks ago for right wrist pain. Prescribed Medrol Dosepak, Norco, and immobilizing wrist splint. Patient reports temporary improvement in symptoms with Medrol Dosepak, but symptoms have recurred.    Patient indicates lateral wrist. Pain occurs with thumb movement. 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 No recent trauma or injury.    The medication, allergy, and problem lists have been reviewed and updated as appropriate.     OBJECTIVE:                                                      /80   Pulse 67   Wt 61.7 kg (136 lb)   SpO2 98%   BMI 24.87 kg/m    Constitutional: well-appearing  Right wrist/hand: normal appearance-no deformity, redness, or swelling; tenderness to palpation along lateral wrist; positive Finkelstein's test.    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  Plan:    - immobilizing thumb wrist splint provided - patient to wear during the day for the next 6 to 8 weeks.    - unfortunately, patient is unwilling to wear the splint while at work.     - declines note for work.     - aware that his condition may not improve without using the splint during the day.    The instructions on the AVS were discussed and explained to the patient. Patient expressed understanding of instructions.    (Chart documentation was completed, in part, with [employer] voice-recognition software. Even though reviewed, some grammatical, spelling, and word errors may remain.)    Shanna Church MD   [place]  [address] [address]  T: [phone], F: [phone]

## 2019-06-29 DIAGNOSIS — I25.10 CORONARY ARTERY DISEASE INVOLVING NATIVE CORONARY ARTERY OF NATIVE HEART WITHOUT ANGINA PECTORIS: ICD-10-CM

## 2019-07-03 RX ORDER — DIGOXIN 125 MCG
TABLET ORAL
Qty: 60 TABLET | Refills: 5 | OUTPATIENT
Start: 2019-07-03

## 2019-07-04 NOTE — PLAN OF CARE
Assessment complete. Patient states she moved from [place] about a month ago and her last OB visit was about 2 moths ago. Patient states she takes 16 mg of Subutex daily, has not smoked marijuana in approximately 2-3 weeks, and smokes 1/2 a pack/day. Patient states she had intercourse yesterday, has had back discomfort for 2 days, and it had gotten worse at 1800. Patient states her father currently has her other 4 children. Dr. Arcenio Roman @ bedside and assessed patient. POC reviewed. Problem: Goal/Outcome  Goal: Goal Outcome Summary  OT: Pt experienced loose stool while engaging in therapeutic tasks today. Pt reported upset stomach. Nursing was notified and present.

## 2019-08-07 DIAGNOSIS — I25.10 CORONARY ARTERY DISEASE INVOLVING NATIVE CORONARY ARTERY OF NATIVE HEART WITHOUT ANGINA PECTORIS: ICD-10-CM

## 2019-08-09 RX ORDER — ASPIRIN 81 MG/1
81 TABLET, CHEWABLE ORAL DAILY
Qty: 90 TABLET | Refills: 0 | Status: SHIPPED | OUTPATIENT
Start: 2019-08-09 | End: 2019-10-16

## 2019-08-09 NOTE — TELEPHONE ENCOUNTER
Last Clinic Visit: 5/1/2019 [facility]  Cardiology protocol: labs passed, done 10-1-18

## 2019-08-16 DIAGNOSIS — I50.22 CHRONIC SYSTOLIC CONGESTIVE HEART FAILURE (H): ICD-10-CM

## 2019-08-18 RX ORDER — METOPROLOL SUCCINATE 50 MG/1
150 TABLET, EXTENDED RELEASE ORAL DAILY
Qty: 180 TABLET | Refills: 3 | OUTPATIENT
Start: 2019-08-18

## 2019-08-28 DIAGNOSIS — I50.22 CHRONIC SYSTOLIC CONGESTIVE HEART FAILURE (H): Primary | ICD-10-CM

## 2019-09-03 ENCOUNTER — OFFICE VISIT (OUTPATIENT)
Dept: CARDIOLOGY | Facility: CLINIC | Age: 54
End: 2019-09-03
Attending: NURSE PRACTITIONER
Payer: COMMERCIAL

## 2019-09-03 VITALS
HEIGHT: 62 IN | HEART RATE: 75 BPM | BODY MASS INDEX: 25.4 KG/M2 | DIASTOLIC BLOOD PRESSURE: 93 MMHG | OXYGEN SATURATION: 98 % | WEIGHT: 138 LBS | SYSTOLIC BLOOD PRESSURE: 157 MMHG

## 2019-09-03 DIAGNOSIS — I50.810 RVF (RIGHT VENTRICULAR FAILURE) (H): ICD-10-CM

## 2019-09-03 DIAGNOSIS — Z95.818 S/P MITRAL VALVE CLIP IMPLANTATION: ICD-10-CM

## 2019-09-03 DIAGNOSIS — Z98.890 S/P MITRAL VALVE CLIP IMPLANTATION: ICD-10-CM

## 2019-09-03 DIAGNOSIS — I34.0 MITRAL VALVE INSUFFICIENCY, UNSPECIFIED ETIOLOGY: ICD-10-CM

## 2019-09-03 DIAGNOSIS — I50.22 CHRONIC SYSTOLIC CONGESTIVE HEART FAILURE (H): ICD-10-CM

## 2019-09-03 DIAGNOSIS — I10 BENIGN ESSENTIAL HYPERTENSION: ICD-10-CM

## 2019-09-03 DIAGNOSIS — I25.10 CORONARY ARTERY DISEASE INVOLVING NATIVE CORONARY ARTERY OF NATIVE HEART WITHOUT ANGINA PECTORIS: ICD-10-CM

## 2019-09-03 DIAGNOSIS — E78.5 HYPERLIPIDEMIA LDL GOAL <70: ICD-10-CM

## 2019-09-03 DIAGNOSIS — I50.22 CHRONIC SYSTOLIC CONGESTIVE HEART FAILURE (H): Primary | ICD-10-CM

## 2019-09-03 LAB
ANION GAP SERPL CALCULATED.3IONS-SCNC: 6 MMOL/L (ref 3–14)
BUN SERPL-MCNC: 22 MG/DL (ref 7–30)
CALCIUM SERPL-MCNC: 9.4 MG/DL (ref 8.5–10.1)
CHLORIDE SERPL-SCNC: 104 MMOL/L (ref 94–109)
CO2 SERPL-SCNC: 26 MMOL/L (ref 20–32)
CREAT SERPL-MCNC: 1.04 MG/DL (ref 0.66–1.25)
GFR SERPL CREATININE-BSD FRML MDRD: 82 ML/MIN/{1.73_M2}
GLUCOSE SERPL-MCNC: 126 MG/DL (ref 70–99)
POTASSIUM SERPL-SCNC: 3.9 MMOL/L (ref 3.4–5.3)
SODIUM SERPL-SCNC: 137 MMOL/L (ref 133–144)

## 2019-09-03 PROCEDURE — 99214 OFFICE O/P EST MOD 30 MIN: CPT | Mod: ZP | Performed by: NURSE PRACTITIONER

## 2019-09-03 PROCEDURE — 80048 BASIC METABOLIC PNL TOTAL CA: CPT | Performed by: NURSE PRACTITIONER

## 2019-09-03 PROCEDURE — 36415 COLL VENOUS BLD VENIPUNCTURE: CPT | Performed by: NURSE PRACTITIONER

## 2019-09-03 PROCEDURE — G0463 HOSPITAL OUTPT CLINIC VISIT: HCPCS | Mod: ZF

## 2019-09-03 RX ORDER — METOPROLOL SUCCINATE 200 MG/1
200 TABLET, EXTENDED RELEASE ORAL DAILY
Qty: 90 TABLET | Refills: 3 | Status: SHIPPED | OUTPATIENT
Start: 2019-09-03 | End: 2019-10-16

## 2019-09-03 ASSESSMENT — PAIN SCALES - GENERAL: PAINLEVEL: NO PAIN (0)

## 2019-09-03 ASSESSMENT — MIFFLIN-ST. JEOR: SCORE: 1355.21

## 2019-09-03 NOTE — PATIENT INSTRUCTIONS
Take your medicines every day, as directed    Changes made today:  o Increase Metoprolol XL back to 200 mg daily.   o    Monitor Your Weight and Symptoms    Contact us if you:      Gain 2 pounds in one day or 5 pounds in one week    Feel more short of breath    Notice more leg swelling    Feel lightheadeded   Change your lifestyle    Limit Salt or Sodium:    2000 mg  Limit Fluids:    2000 mL or approximately 64 ounces  Eat a Heart Healthy Diet    Low in saturated fats  Stay Active:    Aim to move at least 150 minutes every  week         To Contact us    During Business Hours:  [phone], option # 1 (University)  Then option # 4 (medical questions)     After hours, weekends or holidays:   [phone], Option #4  Ask to speak to the On-Call Cardiologist. Inform them you are a CORE/heart failure patient at the [place].     Use [employer] allows you to communicate directly with your heart team through secure messaging.    [employer] can be accessed any time on your phone, computer, or tablet.    If you need assistance, we'd be happy to help!         Keep your Heart Appointments:    1.  Dr. Amaral in November.  2. CORE in January 2020

## 2019-09-03 NOTE — LETTER
9/3/2019      RE: Luke Henao  [address]  [place] [address]       Dear Colleague,    Thank you for the opportunity to participate in the care of your patient, Luke Henao, at the [facility] HEART [facility]. Please see a copy of my visit note below.      HPI: Luke is a 54 year old gentleman with a past medical history of ICM,with an EF of 20-25%,  CAD with a RCA STEMI s/p PCI x 7 to RCA, left circumflex (now ), and LAD on 3/27/17 complicated by PEDRO, sepsis, sacral ulcer, DVT, bilateral hemispheric infarcts secondary to watershed ischemia, and cardiogenic shock s/p IABP and subclavian balloon pump, who underwent mitral clip for MR.  He returns to clinic for routine follow up.     At his last visit in May with Dr. Amaral, his Metoprolol XL was increased to 200 mg daily for better BP control. Professional  present.     Luke is feeling well. He is able to work 10 hour days standing on his feet without difficulty. He denies any SOB, PND, orthopnea, weight gain, or lightheadedness. He needs some refills on his cardiac medications, otherwise has no other concerns today.     PAST MEDICAL HISTORY:  Past Medical History:   Diagnosis Date     CAD (coronary artery disease) 2017    RCA STEMI s/p balloon angioplasty and multiple Sofie to RCA, LCx, and LAD     DVT (deep venous thrombosis) (H) 2017    left internal jugular (line-associated); left common femoral; on coumadin     Ischemic cardiomyopathy 2017    EF 30-35%     Ischemic stroke (H) 2017    no residual deficits     Lower GI bleed 2017    due to rectal ulcer     Mitral valve insufficiency, ischemic 2017    s/p Mitral Clip placement      Type 2 diabetes mellitus (H)        FAMILY HISTORY:  Family History   Problem Relation Age of Onset     Hypertension Mother      Diabetes Type 2  Father      Hypertension Father      Myocardial Infarction No family hx of      Cerebrovascular Disease No family hx of       Coronary Artery Disease Early Onset No family hx of      Colon Cancer No family hx of      Prostate Cancer No family hx of        SOCIAL HISTORY:  Social History     Social History     Marital status:      Spouse name: N/A     Number of children: N/A     Years of education: N/A     Occupational History     [employer]      Social History Main Topics     Smoking status: Former Smoker     Packs/day: 0.50     Years: 30.00     Types: Cigarettes     Quit date: 1/1/2017     Smokeless tobacco: Never Used     Alcohol use No     Drug use: No     Sexual activity: Not Currently     Other Topics Concern     None     Social History Narrative    . Remarried.    4 children with first marriage.    2 grand children.    Walks daily, but no formal exercise.            CURRENT MEDICATIONS:  Current Outpatient Medications   Medication Sig Dispense Refill     aspirin ([employer] ASPIRIN ADULT LOW DOSE) 81 MG chewable tablet Take 1 tablet (81 mg) by mouth daily 90 tablet 0     atorvastatin (LIPITOR) 40 MG tablet Take 1 tablet (40 mg) by mouth daily 60 tablet 5     bumetanide (BUMEX) 0.5 MG tablet Take 2 tabs daily until weight down 3 lbs. Then reduce dose to 0.5 mg daily. 45 tablet 3     cetirizine (ZYRTEC) 10 MG tablet TAKE 1 TABLET (10 MG) BY MOUTH DAILY 60 tablet 10     digoxin (LANOXIN) 125 MCG tablet Take one tablet on M, W, F, Sunday. 60 tablet 5     glipiZIDE (GLUCOTROL) 5 MG tablet TAKE 1 TABLET (5 MG) BY MOUTH 2 TIMES DAILY (BEFORE MEALS) 180 tablet 3     metFORMIN (GLUCOPHAGE-XR) 500 MG 24 hr tablet Take 2 tablets (1,000 mg) by mouth daily (with dinner) 180 tablet 3     metoprolol succinate ER (TOPROL-XL) 200 MG 24 hr tablet Take 1 tablet (200 mg) by mouth daily 30 tablet 11     ONETOUCH ULTRA test strip USE TO TEST BLOOD SUGAR 3 TIMES DAILY OR AS DIRECTED. 100 strip 3     order for DME Equipment being ordered: wrist-thumb immobilizing splint. 1 Units 0     sacubitril-valsartan (ENTRESTO) 49-51 MG per tablet  "Take 1 tablet by mouth 2 times daily 180 tablet 3     sildenafil (VIAGRA) 50 MG tablet Take 1 tablet (50 mg) by mouth daily as needed (30 minutes prior to intercourse) 20 tablet 3       ROS:  Constitutional: Denies fever, chills, or diaphoresis. Weight stable.  ENT: See HPI.    Respiratory:  See HPI  Cardiovascular: As per HPI.   GI: Denies nausea, vomiting, diarrhea, hematemesis, melena, or hematochezia.   : Denies urinary frequency, dysuria, or hematuria.   Integument: Negative for bruising, rash, or pruritis.  Psychiatric: Negative for anxiety, depression, sleep disturbance, or mood changes.   Neuro: Negative for headaches, syncope, numbness or tingling.   Endocrinology:  +diabetes.       EXAM:  BP (!) 157/93 (BP Location: Left arm, Patient Position: Chair, Cuff Size: Adult Regular)   Pulse 75   Ht 1.575 m (5' 2\")   Wt 62.6 kg (138 lb)   SpO2 98%   BMI 25.24 kg/m     General: alert, articulate, and in no acute distress.  HEENT: normocephalic, atraumatic, anicteric sclera, EOMI, mucosa moist, no cyanosis.   Neck: neck supple. JVP not appreciated  Heart: regular rhythm, normal S1/S2, no murmur, gallop, rub.  Precordium quiet with normal PMI.     Lungs: clear, no rales, ronchi, or wheezing.  No accessory muscle use, respirations unlabored.   Abdomen: Soft, nondistended, non-tender, bowel sounds present, no hepatomegaly  Extremities:  No pitting edema.   No cyanosis.  Extremities warm.  2+ pedal pulses.   Neurological: Alert and oriented x 3.  Normal speech and affect, no gross motor deficits  Skin:  No ecchymoses or rashes.     Labs:  CBC RESULTS:  Lab Results   Component Value Date    WBC 10.7 10/01/2018    RBC 4.97 10/01/2018    HGB 14.9 10/01/2018    HCT 46.3 10/01/2018    MCV 93 10/01/2018    MCH 30.0 10/01/2018    MCHC 32.2 10/01/2018    RDW 14.6 10/01/2018     10/01/2018       CMP RESULTS:  Lab Results   Component Value Date     05/01/2019    POTASSIUM 4.2 05/01/2019    CHLORIDE 104 " 05/01/2019    CO2 26 05/01/2019    ANIONGAP 8 05/01/2019     (H) 05/01/2019    BUN 24 05/01/2019    CR 1.12 05/01/2019    GFRESTIMATED 75 05/01/2019    GFRESTBLACK 87 05/01/2019    ROB 9.1 05/01/2019    BILITOTAL 1.4 (H) 10/01/2018    ALBUMIN 4.2 10/01/2018    ALKPHOS 67 10/01/2018    ALT 34 10/01/2018    AST 21 10/01/2018        INR RESULTS:  Lab Results   Component Value Date    INR 2.3 (A) 12/26/2017    INR 2.36 (H) 11/03/2017       No components found for: CK  Lab Results   Component Value Date    MAG 2.4 (H) 06/23/2017     Lab Results   Component Value Date    NTBNP 1,599 (H) 09/11/2017       Most recent echocardiogram:  Interpretation Summary  Severely (EF 20-25%) reduced left ventricular function is present. Severe  diffuse hypokinesis is present.  Right ventricular function, chamber size, wall motion, and thickness are  normal.  S/P MitraClip with trace residual MR and no significant stenosis (MG 2.5 mmHg  at 72 bpm.)  Moderate tricuspid insufficiency is present.  Right ventricular systolic pressure is 41mmHg above the right atrial pressure.  The inferior vena cava was normal in size with preserved respiratory  variability.  This study was compared to a prior TTE from 4/18/2018. LVEF has decreased  slightly.  _____________________________________________________________________________  __        Left Ventricle  Left ventricular size is normal. Left ventricular wall thickness is normal.  Severely (EF 20-30%) reduced left ventricular function is present. Left  ventricular diastolic function is not assessable. Severe diffuse hypokinesis  is present.     Right Ventricle  Right ventricular function, chamber size, wall motion, and thickness are  normal.     Atria  Both atria appear normal. The atrial septum is intact as assessed by color  Doppler .     Mitral Valve  Status post MitraClip. Mean gradient 2.5 mmHg at 72 bpm. Trace mitral  insufficiency is present.        Aortic Valve  The aortic valve is  tricuspid. Mild aortic valve calcification is present.     Tricuspid Valve  Moderate tricuspid insufficiency is present. Right ventricular systolic  pressure is 41mmHg above the right atrial pressure. Etiology of tricuspid  valve regurgitation is leaflet impingement by an ICD lead. Pulmonary artery  systolic pressure is mildly elevated.     Pulmonic Valve  The pulmonic valve is normal.     Vessels  The aorta root is normal. The thoracic aorta is normal. The inferior vena cava  was normal in size with preserved respiratory variability. Ascending aorta 3.2  cm. IVC diameter <2.1 cm collapsing >50% with sniff suggests a normal RA  pressure of 3 mmHg.     Pericardium  No pericardial effusion is present.        Compared to Previous Study  This study was compared to a prior TTE from 4/18/2018. LVEF has decreased  slightly.      Assessment and Plan:    Luke is a 53 year old gentleman with a past medical history of ICM who appears euvolemic today.  I increased his Toprol XL back to 200 mg daily as he got confused as to what dose he should be taking because he picked up the old prescription for 150 mg daily.  He doesn't want to adjust any further medications at this time except for the Toprol XL, which he will increase to 200 mg daily for better BP control.  He will return to see Dr. Romero in November and CORE in January.    1.  Chronic systolic heart failure secondary to ischemic cardiomyopathy.  Stage D NYHA Class II  ACEi/ARB: Entresto  49/51 mg twice daily. Previous attempts to titrate resulted in lightheadedness and hypotension.  BB: yes, Toprol  mg daily.   Aldosterone antagonist: Deferred due to worsening renal function with previous attempts.   SCD prophylaxis: ICD  Fluid status:  Euvolemic  Anticoagulation: None.   Antiplatelet:  ASA dose 81 mg daily.   Sleep apnea:  Not documented.  NSAID use:  Contraindicated.  Avoid use.  Remote Monitoring: None.   RV support:  Continue Digoxin. Last digoxin level was  0.6 in June 2018. Repeat digoxin level at next visit.    2.  CAD:  Stable.  Continue Plavix, ASA, Metoprolol, Lisinopril, and Atorvastatin      3.  Mitral valve repair s/p mitral clip:  Follow up with Dr. mAaral as scheduled in November.      4. HTN: BP today 157/93.  Home BP readings 116/80's. Continue Entresto 49/51 mg twice daily.  Patient unwilling to increase at this time as prior attempts resulted in hypotension and lightheadedness.    5. Hyperlipidemia: Last lipids on 10/22/18.  , Triglycerides 139, HDL 31, LDL 50.     6.  Follow up: Dr. Amaral in November.  CORE in January.        Vandana VENEGAS, CNP  [facility]

## 2019-09-03 NOTE — PROGRESS NOTES
HPI: Luke is a 54 year old gentleman with a past medical history of ICM,with an EF of 20-25%,  CAD with a RCA STEMI s/p PCI x 7 to RCA, left circumflex (now ), and LAD on 3/27/17 complicated by PEDRO, sepsis, sacral ulcer, DVT, bilateral hemispheric infarcts secondary to watershed ischemia, and cardiogenic shock s/p IABP and subclavian balloon pump, who underwent mitral clip for MR.  He returns to clinic for routine follow up.     At his last visit in May with Dr. Amaral, his Metoprolol XL was increased to 200 mg daily for better BP control. Professional  present.     Luke is feeling well. He is able to work 10 hour days standing on his feet without difficulty. He denies any SOB, PND, orthopnea, weight gain, or lightheadedness. He needs some refills on his cardiac medications, otherwise has no other concerns today.     PAST MEDICAL HISTORY:  Past Medical History:   Diagnosis Date     CAD (coronary artery disease) 2017    RCA STEMI s/p balloon angioplasty and multiple Sofie to RCA, LCx, and LAD     DVT (deep venous thrombosis) (H) 2017    left internal jugular (line-associated); left common femoral; on coumadin     Ischemic cardiomyopathy 2017    EF 30-35%     Ischemic stroke (H) 2017    no residual deficits     Lower GI bleed 2017    due to rectal ulcer     Mitral valve insufficiency, ischemic 2017    s/p Mitral Clip placement      Type 2 diabetes mellitus (H)        FAMILY HISTORY:  Family History   Problem Relation Age of Onset     Hypertension Mother      Diabetes Type 2  Father      Hypertension Father      Myocardial Infarction No family hx of      Cerebrovascular Disease No family hx of      Coronary Artery Disease Early Onset No family hx of      Colon Cancer No family hx of      Prostate Cancer No family hx of        SOCIAL HISTORY:  Social History     Social History     Marital status:      Spouse name: N/A     Number of children: N/A     Years of education: N/A     Occupational  History     H[employer]      Social History Main Topics     Smoking status: Former Smoker     Packs/day: 0.50     Years: 30.00     Types: Cigarettes     Quit date: 1/1/2017     Smokeless tobacco: Never Used     Alcohol use No     Drug use: No     Sexual activity: Not Currently     Other Topics Concern     None     Social History Narrative    . Remarried.    4 children with first marriage.    2 grand children.    Walks daily, but no formal exercise.            CURRENT MEDICATIONS:  Current Outpatient Medications   Medication Sig Dispense Refill     aspirin ([employer] ASPIRIN ADULT LOW DOSE) 81 MG chewable tablet Take 1 tablet (81 mg) by mouth daily 90 tablet 0     atorvastatin (LIPITOR) 40 MG tablet Take 1 tablet (40 mg) by mouth daily 60 tablet 5     bumetanide (BUMEX) 0.5 MG tablet Take 2 tabs daily until weight down 3 lbs. Then reduce dose to 0.5 mg daily. 45 tablet 3     cetirizine (ZYRTEC) 10 MG tablet TAKE 1 TABLET (10 MG) BY MOUTH DAILY 60 tablet 10     digoxin (LANOXIN) 125 MCG tablet Take one tablet on M, W, F, Sunday. 60 tablet 5     glipiZIDE (GLUCOTROL) 5 MG tablet TAKE 1 TABLET (5 MG) BY MOUTH 2 TIMES DAILY (BEFORE MEALS) 180 tablet 3     metFORMIN (GLUCOPHAGE-XR) 500 MG 24 hr tablet Take 2 tablets (1,000 mg) by mouth daily (with dinner) 180 tablet 3     metoprolol succinate ER (TOPROL-XL) 200 MG 24 hr tablet Take 1 tablet (200 mg) by mouth daily 30 tablet 11     ONETOUCH ULTRA test strip USE TO TEST BLOOD SUGAR 3 TIMES DAILY OR AS DIRECTED. 100 strip 3     order for DME Equipment being ordered: wrist-thumb immobilizing splint. 1 Units 0     sacubitril-valsartan (ENTRESTO) 49-51 MG per tablet Take 1 tablet by mouth 2 times daily 180 tablet 3     sildenafil (VIAGRA) 50 MG tablet Take 1 tablet (50 mg) by mouth daily as needed (30 minutes prior to intercourse) 20 tablet 3       ROS:  Constitutional: Denies fever, chills, or diaphoresis. Weight stable.  ENT: See HPI.    Respiratory:  See  "HPI  Cardiovascular: As per HPI.   GI: Denies nausea, vomiting, diarrhea, hematemesis, melena, or hematochezia.   : Denies urinary frequency, dysuria, or hematuria.   Integument: Negative for bruising, rash, or pruritis.  Psychiatric: Negative for anxiety, depression, sleep disturbance, or mood changes.   Neuro: Negative for headaches, syncope, numbness or tingling.   Endocrinology:  +diabetes.       EXAM:  BP (!) 157/93 (BP Location: Left arm, Patient Position: Chair, Cuff Size: Adult Regular)   Pulse 75   Ht 1.575 m (5' 2\")   Wt 62.6 kg (138 lb)   SpO2 98%   BMI 25.24 kg/m    General: alert, articulate, and in no acute distress.  HEENT: normocephalic, atraumatic, anicteric sclera, EOMI, mucosa moist, no cyanosis.   Neck: neck supple. JVP not appreciated  Heart: regular rhythm, normal S1/S2, no murmur, gallop, rub.  Precordium quiet with normal PMI.     Lungs: clear, no rales, ronchi, or wheezing.  No accessory muscle use, respirations unlabored.   Abdomen: Soft, nondistended, non-tender, bowel sounds present, no hepatomegaly  Extremities:  No pitting edema.   No cyanosis.  Extremities warm.  2+ pedal pulses.   Neurological: Alert and oriented x 3.  Normal speech and affect, no gross motor deficits  Skin:  No ecchymoses or rashes.     Labs:  CBC RESULTS:  Lab Results   Component Value Date    WBC 10.7 10/01/2018    RBC 4.97 10/01/2018    HGB 14.9 10/01/2018    HCT 46.3 10/01/2018    MCV 93 10/01/2018    MCH 30.0 10/01/2018    MCHC 32.2 10/01/2018    RDW 14.6 10/01/2018     10/01/2018       CMP RESULTS:  Lab Results   Component Value Date     05/01/2019    POTASSIUM 4.2 05/01/2019    CHLORIDE 104 05/01/2019    CO2 26 05/01/2019    ANIONGAP 8 05/01/2019     (H) 05/01/2019    BUN 24 05/01/2019    CR 1.12 05/01/2019    GFRESTIMATED 75 05/01/2019    GFRESTBLACK 87 05/01/2019    ROB 9.1 05/01/2019    BILITOTAL 1.4 (H) 10/01/2018    ALBUMIN 4.2 10/01/2018    ALKPHOS 67 10/01/2018    ALT 34 " 10/01/2018    AST 21 10/01/2018        INR RESULTS:  Lab Results   Component Value Date    INR 2.3 (A) 12/26/2017    INR 2.36 (H) 11/03/2017       No components found for: CK  Lab Results   Component Value Date    MAG 2.4 (H) 06/23/2017     Lab Results   Component Value Date    NTBNP 1,599 (H) 09/11/2017       Most recent echocardiogram:  Interpretation Summary  Severely (EF 20-25%) reduced left ventricular function is present. Severe  diffuse hypokinesis is present.  Right ventricular function, chamber size, wall motion, and thickness are  normal.  S/P MitraClip with trace residual MR and no significant stenosis (MG 2.5 mmHg  at 72 bpm.)  Moderate tricuspid insufficiency is present.  Right ventricular systolic pressure is 41mmHg above the right atrial pressure.  The inferior vena cava was normal in size with preserved respiratory  variability.  This study was compared to a prior TTE from 4/18/2018. LVEF has decreased  slightly.  _____________________________________________________________________________  __        Left Ventricle  Left ventricular size is normal. Left ventricular wall thickness is normal.  Severely (EF 20-30%) reduced left ventricular function is present. Left  ventricular diastolic function is not assessable. Severe diffuse hypokinesis  is present.     Right Ventricle  Right ventricular function, chamber size, wall motion, and thickness are  normal.     Atria  Both atria appear normal. The atrial septum is intact as assessed by color  Doppler .     Mitral Valve  Status post MitraClip. Mean gradient 2.5 mmHg at 72 bpm. Trace mitral  insufficiency is present.        Aortic Valve  The aortic valve is tricuspid. Mild aortic valve calcification is present.     Tricuspid Valve  Moderate tricuspid insufficiency is present. Right ventricular systolic  pressure is 41mmHg above the right atrial pressure. Etiology of tricuspid  valve regurgitation is leaflet impingement by an ICD lead. Pulmonary  artery  systolic pressure is mildly elevated.     Pulmonic Valve  The pulmonic valve is normal.     Vessels  The aorta root is normal. The thoracic aorta is normal. The inferior vena cava  was normal in size with preserved respiratory variability. Ascending aorta 3.2  cm. IVC diameter <2.1 cm collapsing >50% with sniff suggests a normal RA  pressure of 3 mmHg.     Pericardium  No pericardial effusion is present.        Compared to Previous Study  This study was compared to a prior TTE from 4/18/2018. LVEF has decreased  slightly.      Assessment and Plan:    Luke is a 53 year old gentleman with a past medical history of ICM who appears euvolemic today.  I increased his Toprol XL back to 200 mg daily as he got confused as to what dose he should be taking because he picked up the old prescription for 150 mg daily.  He doesn't want to adjust any further medications at this time except for the Toprol XL, which he will increase to 200 mg daily for better BP control.  He will return to see Dr. Romero in November and [facility] in January.    1.  Chronic systolic heart failure secondary to ischemic cardiomyopathy.  Stage D NYHA Class II  ACEi/ARB: Entresto  49/51 mg twice daily. Previous attempts to titrate resulted in lightheadedness and hypotension.  BB: yes, Toprol  mg daily.   Aldosterone antagonist: Deferred due to worsening renal function with previous attempts.   SCD prophylaxis: ICD  Fluid status:  Euvolemic  Anticoagulation: None.   Antiplatelet:  ASA dose 81 mg daily.   Sleep apnea:  Not documented.  NSAID use:  Contraindicated.  Avoid use.  Remote Monitoring: None.   RV support:  Continue Digoxin. Last digoxin level was 0.6 in June 2018. Repeat digoxin level at next visit.    2.  CAD:  Stable.  Continue Plavix, ASA, Metoprolol, Lisinopril, and Atorvastatin      3.  Mitral valve repair s/p mitral clip:  Follow up with Dr. Amaral as scheduled in November.      4. HTN: BP today 157/93.  Home BP readings  116/80's. Continue Entresto 49/51 mg twice daily.  Patient unwilling to increase at this time as prior attempts resulted in hypotension and lightheadedness.    5. Hyperlipidemia: Last lipids on 10/22/18.  , Triglycerides 139, HDL 31, LDL 50.     6.  Follow up: Dr. Amaral in November.  [facility] in January.        Vandana VENEGAS, [facility]  [facility]

## 2019-09-03 NOTE — NURSING NOTE
Chief Complaint   Patient presents with     Follow Up     54 year old male with chronic systolic heart failure presents for follow up with labs prior.      Vitals were taken and medications were reconciled.     Marie Sylvester CMA    11:41 AM

## 2019-10-04 DIAGNOSIS — I25.5 ISCHEMIC CARDIOMYOPATHY: ICD-10-CM

## 2019-10-08 RX ORDER — BUMETANIDE 0.5 MG/1
1 TABLET ORAL DAILY
Qty: 45 TABLET | Refills: 3 | OUTPATIENT
Start: 2019-10-08

## 2019-10-08 NOTE — TELEPHONE ENCOUNTER
Denied: current dosage:Take 2 tabs daily until weight down 3 lbs. Then reduce  dose to 0.5 mg daily.    bumetanide (BUMEX) 0.5 MG tablet 45 tablet 3 12/4/2018  No   Sig: Take 2 tabs daily until weight down 3 lbs. Then reduce dose to 0.5 mg daily.

## 2019-10-14 DIAGNOSIS — I25.5 ISCHEMIC CARDIOMYOPATHY: ICD-10-CM

## 2019-10-16 ENCOUNTER — OFFICE VISIT (OUTPATIENT)
Dept: CARDIOLOGY | Facility: CLINIC | Age: 54
End: 2019-10-16
Attending: INTERNAL MEDICINE
Payer: COMMERCIAL

## 2019-10-16 VITALS
DIASTOLIC BLOOD PRESSURE: 110 MMHG | SYSTOLIC BLOOD PRESSURE: 155 MMHG | HEART RATE: 79 BPM | WEIGHT: 141.7 LBS | OXYGEN SATURATION: 97 % | HEIGHT: 62 IN | BODY MASS INDEX: 26.07 KG/M2

## 2019-10-16 DIAGNOSIS — Z98.890 HISTORY OF MITRAL VALVE REPAIR: ICD-10-CM

## 2019-10-16 DIAGNOSIS — I25.10 CORONARY ARTERY DISEASE INVOLVING NATIVE CORONARY ARTERY OF NATIVE HEART WITHOUT ANGINA PECTORIS: ICD-10-CM

## 2019-10-16 DIAGNOSIS — I25.5 ISCHEMIC CARDIOMYOPATHY: ICD-10-CM

## 2019-10-16 DIAGNOSIS — I50.810 RVF (RIGHT VENTRICULAR FAILURE) (H): ICD-10-CM

## 2019-10-16 DIAGNOSIS — R57.0 CARDIOGENIC SHOCK (H): ICD-10-CM

## 2019-10-16 DIAGNOSIS — I50.22 CHRONIC SYSTOLIC CONGESTIVE HEART FAILURE (H): ICD-10-CM

## 2019-10-16 LAB
ANION GAP SERPL CALCULATED.3IONS-SCNC: 7 MMOL/L (ref 3–14)
BUN SERPL-MCNC: 18 MG/DL (ref 7–30)
CALCIUM SERPL-MCNC: 8.4 MG/DL (ref 8.5–10.1)
CHLORIDE SERPL-SCNC: 103 MMOL/L (ref 94–109)
CO2 SERPL-SCNC: 28 MMOL/L (ref 20–32)
CREAT SERPL-MCNC: 1.01 MG/DL (ref 0.66–1.25)
DIGOXIN SERPL-MCNC: 0.1 UG/L (ref 0.5–2)
GFR SERPL CREATININE-BSD FRML MDRD: 85 ML/MIN/{1.73_M2}
GLUCOSE SERPL-MCNC: 107 MG/DL (ref 70–99)
POTASSIUM SERPL-SCNC: 3.9 MMOL/L (ref 3.4–5.3)
SODIUM SERPL-SCNC: 138 MMOL/L (ref 133–144)

## 2019-10-16 PROCEDURE — 99213 OFFICE O/P EST LOW 20 MIN: CPT | Mod: ZP | Performed by: INTERNAL MEDICINE

## 2019-10-16 PROCEDURE — 36415 COLL VENOUS BLD VENIPUNCTURE: CPT | Performed by: NURSE PRACTITIONER

## 2019-10-16 PROCEDURE — 93010 ELECTROCARDIOGRAM REPORT: CPT | Mod: ZP | Performed by: INTERNAL MEDICINE

## 2019-10-16 PROCEDURE — 93005 ELECTROCARDIOGRAM TRACING: CPT | Mod: ZF

## 2019-10-16 PROCEDURE — G0463 HOSPITAL OUTPT CLINIC VISIT: HCPCS | Mod: 25,ZF

## 2019-10-16 PROCEDURE — 80162 ASSAY OF DIGOXIN TOTAL: CPT | Performed by: NURSE PRACTITIONER

## 2019-10-16 PROCEDURE — 80048 BASIC METABOLIC PNL TOTAL CA: CPT | Performed by: NURSE PRACTITIONER

## 2019-10-16 RX ORDER — ATORVASTATIN CALCIUM 40 MG/1
40 TABLET, FILM COATED ORAL DAILY
Qty: 90 TABLET | Refills: 3 | Status: SHIPPED | OUTPATIENT
Start: 2019-10-16 | End: 2020-11-16

## 2019-10-16 RX ORDER — ASPIRIN 81 MG/1
81 TABLET, CHEWABLE ORAL DAILY
Qty: 90 TABLET | Refills: 0 | Status: SHIPPED | OUTPATIENT
Start: 2019-10-16 | End: 2020-01-20

## 2019-10-16 RX ORDER — BUMETANIDE 0.5 MG/1
TABLET ORAL
Qty: 45 TABLET | Refills: 11 | Status: SHIPPED | OUTPATIENT
Start: 2019-10-16 | End: 2020-07-30

## 2019-10-16 RX ORDER — METOPROLOL SUCCINATE 200 MG/1
200 TABLET, EXTENDED RELEASE ORAL DAILY
Qty: 90 TABLET | Refills: 3 | Status: SHIPPED | OUTPATIENT
Start: 2019-10-16 | End: 2020-11-16

## 2019-10-16 ASSESSMENT — MIFFLIN-ST. JEOR: SCORE: 1372

## 2019-10-16 ASSESSMENT — PAIN SCALES - GENERAL: PAINLEVEL: NO PAIN (0)

## 2019-10-16 NOTE — LETTER
10/16/2019      RE: Luke Henao  [address]  [facility] [address]       Dear Colleague,    Thank you for the opportunity to participate in the care of your patient, Luke Henao, at the [facility] at [facility]. Please see a copy of my visit note below.        CARDIOLOGY RETURN VISIT    HPI: Luke Henao is a 53 year old male being seen today for follow up of ischemic cardiomyopathy status post inferior STEMI in 2017 that was complicated with cardiac genic shock and mitral regurgitation.  Following heart attack in March 2017 he had mitral clip placed in May 2017 and ICD placed in October 2017.  Since then done very well during follow-up with core clinic.  He has tolerated up titration of medications and been started on Entresto which he believes has made him feel stronger.  Now again with primary complaint of pain to his thighs and worried about dizziness and falls with upcoming cold/winter. Plans to travel soon to visit brother in [place]. Tolerating his medications.   The patient denies a history of chest discomfort, dyspnea, PND (paroxysmal nocturnal dyspnea), orthopnea, palpitations, lightheadedness and syncope.    Able to walk 1 flight of stairs (FOS) without stopping     The ASCVD Risk score (Severo DC Jr., et al., 2013) failed to calculate for the following reasons:    The patient has a prior MI or stroke diagnosis    PAST MEDICAL HISTORY:  Past Medical History:   Diagnosis Date     CAD (coronary artery disease) 2017    RCA STEMI s/p balloon angioplasty and multiple Sofie to RCA, LCx, and LAD     DVT (deep venous thrombosis) (H) 2017    left internal jugular (line-associated); left common femoral; on coumadin     Ischemic cardiomyopathy 2017    EF 30-35%     Ischemic stroke (H) 2017    no residual deficits     Lower GI bleed 2017    due to rectal ulcer     Mitral valve insufficiency, ischemic 2017    s/p Mitral Clip placement      Type 2 diabetes mellitus  (H)        CURRENT MEDICATIONS:  Current Outpatient Medications   Medication Sig Dispense Refill     aspirin ([employer]S ASPIRIN ADULT LOW DOSE) 81 MG chewable tablet Take 1 tablet (81 mg) by mouth daily 90 tablet 0     atorvastatin (LIPITOR) 40 MG tablet Take 1 tablet (40 mg) by mouth daily 60 tablet 5     bumetanide (BUMEX) 0.5 MG tablet Take 2 tabs daily until weight down 3 lbs. Then reduce dose to 0.5 mg daily. 45 tablet 3     cetirizine (ZYRTEC) 10 MG tablet TAKE 1 TABLET (10 MG) BY MOUTH DAILY 60 tablet 10     glipiZIDE (GLUCOTROL) 5 MG tablet TAKE 1 TABLET (5 MG) BY MOUTH 2 TIMES DAILY (BEFORE MEALS) 180 tablet 3     metFORMIN (GLUCOPHAGE-XR) 500 MG 24 hr tablet Take 2 tablets (1,000 mg) by mouth daily (with dinner) 180 tablet 3     metoprolol succinate ER (TOPROL-XL) 200 MG 24 hr tablet Take 1 tablet (200 mg) by mouth daily 90 tablet 3     ONETOUCH ULTRA test strip USE TO TEST BLOOD SUGAR 3 TIMES DAILY OR AS DIRECTED. 100 strip 3     sacubitril-valsartan (ENTRESTO) 49-51 MG per tablet Take 1 tablet by mouth 2 times daily 180 tablet 3     digoxin (LANOXIN) 125 MCG tablet Take one tablet on M, W, F, Sunday. (Patient not taking: Reported on 9/3/2019) 60 tablet 5     order for DME Equipment being ordered: wrist-thumb immobilizing splint. (Patient not taking: Reported on 9/3/2019) 1 Units 0     sildenafil (VIAGRA) 50 MG tablet Take 1 tablet (50 mg) by mouth daily as needed (30 minutes prior to intercourse) (Patient not taking: Reported on 9/3/2019) 20 tablet 3       PAST SURGICAL HISTORY:  Past Surgical History:   Procedure Laterality Date     C INSERT ELECTRD LEADS/REPOSTION  10/27/2017          COLONOSCOPY N/A 4/17/2017    Procedure: COLONOSCOPY;  Surgeon: Rashaad Bundy MD;  [facility]: [facility]     INSERT INTRAAORTIC BALLOON PUMP Right 4/19/2017    Procedure: INSERT INTRAAORTIC BALLOON PUMP;  Right Subclavian Intra Aortic Balloon Pump Insertion using Maquet 40cc Ballon Catheter, Implentation of 8mm Gelweave Woven  Vascular Prosthesis, Removal of Left Femoral Ballon Pump Catheter, Flouroscopy;  Surgeon: Keshav Leung MD;  Location: [facility]     PERCUTANEOUS MITRAL VALVE REPAIR N/A 2017    Procedure: PERCUTANEOUS MITRAL VALVE REPAIR ANESTHESIA;  Mitraclip Procedure Possible Cardiopulmonary Bypass ;  Surgeon: Ron Cortez MD;  Location: [facility]     SUBCLAVIAN AORTIC VALVE IMPLANT N/A 2017    Procedure: SUBCLAVIAN AORTIC VALVE IMPLANT;  Right Subclavian Graft Removal ;  Surgeon: Keshav Leung MD;  Location: [facility]       ALLERGIES  Seasonal allergies    FAMILY HX:  Family History   Problem Relation Age of Onset     Hypertension Mother      Diabetes Type 2  Father      Hypertension Father      Myocardial Infarction No family hx of      Cerebrovascular Disease No family hx of      Coronary Artery Disease Early Onset No family hx of      Colon Cancer No family hx of      Prostate Cancer No family hx of        SOCIAL HX:  Social History     Socioeconomic History     Marital status:      Spouse name: None     Number of children: None     Years of education: None     Highest education level: None   Occupational History     Occupation: [employer]   Social Needs     Financial resource strain: None     Food insecurity:     Worry: None     Inability: None     Transportation needs:     Medical: None     Non-medical: None   Tobacco Use     Smoking status: Former Smoker     Packs/day: 0.50     Years: 30.00     Pack years: 15.00     Types: Cigarettes     Last attempt to quit: 2017     Years since quittin.3     Smokeless tobacco: Never Used   Substance and Sexual Activity     Alcohol use: No     Drug use: No     Sexual activity: Not Currently   Lifestyle     Physical activity:     Days per week: None     Minutes per session: None     Stress: None   Relationships     Social connections:     Talks on phone: None     Gets together: None     Attends Orthodox service: None     Active member  "of club or organization: None     Attends meetings of clubs or organizations: None     Relationship status: None     Intimate partner violence:     Fear of current or ex partner: None     Emotionally abused: None     Physically abused: None     Forced sexual activity: None   Other Topics Concern     Parent/sibling w/ CABG, MI or angioplasty before 65F 55M? Not Asked   Social History Narrative    . Remarried.    4 children with first marriage.    2 grand children.    Walks daily, but no formal exercise.        ROS:  Constitutional: No recent fever, chills, or sweats. No significant weight gain/loss.   ENT: No epistaxis.  Allergies/Immunologic: As above.   Respiratory: No hemoptysis.   Cardiovascular: As per HPI.   GI: No hematemesis, melena, or hematochezia.   : No hematuria.   Skin: No petechia or ecchymosis.   Endocrinology: Positive for diabetes  Musculoskeletal: No myalgia.    VITAL SIGNS:  BP (!) 155/110 (BP Location: Left arm, Patient Position: Chair, Cuff Size: Adult Regular)   Pulse 79   Ht 1.575 m (5' 2\")   Wt 64.3 kg (141 lb 11.2 oz)   SpO2 97%   BMI 25.92 kg/m       Body mass index is 25.92 kg/m .  Wt Readings from Last 2 Encounters:   10/16/19 64.3 kg (141 lb 11.2 oz)   09/03/19 62.6 kg (138 lb)       PHYSICAL EXAM  Luke Henao is a 53 year old male in no acute distress.  HEENT: Unremarkable.  Neck: JVP normal.  Carotids bilaterally without bruits.  Lungs: CTA.  Cor: RRR. Normal S1 and S2.  No murmur, rub, or gallop.   Abd: Soft, nontender, nondistended.  NABS.  No pulsatile mass.  Extremities: No C/C/E.  Pulses +symmetric in upper and lower extremities.  Neuro: Grossly intact.    LABS    Lab Results   Component Value Date    WBC 10.7 10/01/2018     Lab Results   Component Value Date    RBC 4.97 10/01/2018     Lab Results   Component Value Date    HGB 14.9 10/01/2018     Lab Results   Component Value Date    HCT 46.3 10/01/2018     No components found for: MCT  Lab Results   Component Value " Date    MCV 93 10/01/2018     Lab Results   Component Value Date    MCH 30.0 10/01/2018     Lab Results   Component Value Date    MCHC 32.2 10/01/2018     Lab Results   Component Value Date    RDW 14.6 10/01/2018     Lab Results   Component Value Date     10/01/2018      Recent Labs   Lab Test 05/01/19  0849 04/02/19  1113    137   POTASSIUM 4.2 4.5   CHLORIDE 104 106   CO2 26 22   ANIONGAP 8 9   * 140*   BUN 24 31*   CR 1.12 1.15   ROB 9.1 8.7     Recent Labs   Lab Test 10/22/18  0909 10/01/18  1018   CHOL 109 135   HDL 31* 31*   LDL 50 74   TRIG 139 148   NHDL 78 104        EKG: Today with SR 79 bpm and inferior Q wave   2/28/19 with SR and inferior Q wave     ECHO: 5/1/19  Interpretation Summary  Severely (EF 20-25%) reduced left ventricular function is present. Severe  diffuse hypokinesis is present.  Right ventricular function, chamber size, wall motion, and thickness are  normal.  S/P MitraClip with trace residual MR and no significant stenosis (MG 2.5 mmHg  at 72 bpm.)  Moderate tricuspid insufficiency is present.  Right ventricular systolic pressure is 41mmHg above the right atrial pressure.  The inferior vena cava was normal in size with preserved respiratory  variability.  This study was compared to a prior TTE from 4/18/2018. LVEF has decreased  slightly.        ASSESSMENT AND PLAN:    1. Ischemic cardiomyopathy    2. History of mitral valve repair with mitral clip 2017    3. Coronary artery disease involving native coronary artery of native heart without angina pectoris    4. Cardiogenic shock (H)    5. Chronic systolic congestive heart failure (H)      53-year-old male with ischemic cardiomyopathy status post acute myocardial infarction in 2017 that was complicated with cardiogenic shock and mitral regurgitation.  Also with mitral clip and ICD.  Followed [facility] clinic.     No change in symptoms and able to perform ADL.     CAD. No new EKG findings and tolerating aspirin/statin  treatment. Chronic thigh pain possible related to statin therapy and advised to try CoQ10 100-200 mg/day.     CHF. HTN today but recently had uptitration of metoprolol and planning on trip in few weeks. Needs to continue with metoprolol/Entresto/Bumex as before. Might consider further uptitration during follow up with [facility] clinic or would add amlodipine.     Amanda Amaral MD    Division of Cardiology  [facility] [address] [address] [phone] Appointments  [phone] Fax  [phone] After hours    Clinic nurse:  Agustin Antony LPN   Nurse Care Coordinator- Heart Care   [phone] option 1, than option 3    Academic Mailing address:  [facility]  Department of Internal Medicine ([facility])  [address] [place] [id number]

## 2019-10-16 NOTE — NURSING NOTE
EKG completed  Chief Complaint   Patient presents with     Follow Up     follow up after [facility]

## 2019-10-16 NOTE — NURSING NOTE
Chief Complaint   Patient presents with     Follow Up     follow up after [facility]     Vitals were taken and medications were reconciled.   Mary Kay Tolbert  10:01 AM

## 2019-10-16 NOTE — PROGRESS NOTES
CARDIOLOGY RETURN VISIT    HPI: Luke Henao is a 53 year old male being seen today for follow up of ischemic cardiomyopathy status post inferior STEMI in 2017 that was complicated with cardiac genic shock and mitral regurgitation.  Following heart attack in March 2017 he had mitral clip placed in May 2017 and ICD placed in October 2017.  Since then done very well during follow-up with core clinic.  He has tolerated up titration of medications and been started on Entresto which he believes has made him feel stronger.  Now again with primary complaint of pain to his thighs and worried about dizziness and falls with upcoming cold/winter. Plans to travel soon to visit brother in [place]. Tolerating his medications.   The patient denies a history of chest discomfort, dyspnea, PND (paroxysmal nocturnal dyspnea), orthopnea, palpitations, lightheadedness and syncope.    Able to walk 1 flight of stairs (FOS) without stopping     The ASCVD Risk score (Severogenesis SEO Jr., et al., 2013) failed to calculate for the following reasons:    The patient has a prior MI or stroke diagnosis    PAST MEDICAL HISTORY:  Past Medical History:   Diagnosis Date     CAD (coronary artery disease) 2017    RCA STEMI s/p balloon angioplasty and multiple Sofie to RCA, LCx, and LAD     DVT (deep venous thrombosis) (H) 2017    left internal jugular (line-associated); left common femoral; on coumadin     Ischemic cardiomyopathy 2017    EF 30-35%     Ischemic stroke (H) 2017    no residual deficits     Lower GI bleed 2017    due to rectal ulcer     Mitral valve insufficiency, ischemic 2017    s/p Mitral Clip placement      Type 2 diabetes mellitus (H)        CURRENT MEDICATIONS:  Current Outpatient Medications   Medication Sig Dispense Refill     aspirin ([employer] ASPIRIN ADULT LOW DOSE) 81 MG chewable tablet Take 1 tablet (81 mg) by mouth daily 90 tablet 0     atorvastatin (LIPITOR) 40 MG tablet Take 1 tablet (40 mg) by mouth daily 60 tablet 5     bumetanide  (BUMEX) 0.5 MG tablet Take 2 tabs daily until weight down 3 lbs. Then reduce dose to 0.5 mg daily. 45 tablet 3     cetirizine (ZYRTEC) 10 MG tablet TAKE 1 TABLET (10 MG) BY MOUTH DAILY 60 tablet 10     glipiZIDE (GLUCOTROL) 5 MG tablet TAKE 1 TABLET (5 MG) BY MOUTH 2 TIMES DAILY (BEFORE MEALS) 180 tablet 3     metFORMIN (GLUCOPHAGE-XR) 500 MG 24 hr tablet Take 2 tablets (1,000 mg) by mouth daily (with dinner) 180 tablet 3     metoprolol succinate ER (TOPROL-XL) 200 MG 24 hr tablet Take 1 tablet (200 mg) by mouth daily 90 tablet 3     ONETOUCH ULTRA test strip USE TO TEST BLOOD SUGAR 3 TIMES DAILY OR AS DIRECTED. 100 strip 3     sacubitril-valsartan (ENTRESTO) 49-51 MG per tablet Take 1 tablet by mouth 2 times daily 180 tablet 3     digoxin (LANOXIN) 125 MCG tablet Take one tablet on M, W, F, Sunday. (Patient not taking: Reported on 9/3/2019) 60 tablet 5     order for DME Equipment being ordered: wrist-thumb immobilizing splint. (Patient not taking: Reported on 9/3/2019) 1 Units 0     sildenafil (VIAGRA) 50 MG tablet Take 1 tablet (50 mg) by mouth daily as needed (30 minutes prior to intercourse) (Patient not taking: Reported on 9/3/2019) 20 tablet 3       PAST SURGICAL HISTORY:  Past Surgical History:   Procedure Laterality Date     C INSERT ELECTRD LEADS/REPOSTION  10/27/2017          COLONOSCOPY N/A 4/17/2017    Procedure: COLONOSCOPY;  Surgeon: Rashaad Bundy MD;  Location: [facility]     INSERT INTRAAORTIC BALLOON PUMP Right 4/19/2017    Procedure: INSERT INTRAAORTIC BALLOON PUMP;  Right Subclavian Intra Aortic Balloon Pump Insertion using Maquet 40cc Ballon Catheter, Implentation of 8mm Gelweave Woven Vascular Prosthesis, Removal of Left Femoral Ballon Pump Catheter, Flouroscopy;  Surgeon: Keshav Leung MD;  Location: [facility]     PERCUTANEOUS MITRAL VALVE REPAIR N/A 5/1/2017    Procedure: PERCUTANEOUS MITRAL VALVE REPAIR ANESTHESIA;  Mitraclip Procedure Possible Cardiopulmonary Bypass ;  Surgeon:  Ron Cortez MD;  Location: [facility]     SUBCLAVIAN AORTIC VALVE IMPLANT N/A 2017    Procedure: SUBCLAVIAN AORTIC VALVE IMPLANT;  Right Subclavian Graft Removal ;  Surgeon: Keshav Leung MD;  Location: [facility]       ALLERGIES  Seasonal allergies    FAMILY HX:  Family History   Problem Relation Age of Onset     Hypertension Mother      Diabetes Type 2  Father      Hypertension Father      Myocardial Infarction No family hx of      Cerebrovascular Disease No family hx of      Coronary Artery Disease Early Onset No family hx of      Colon Cancer No family hx of      Prostate Cancer No family hx of        SOCIAL HX:  Social History     Socioeconomic History     Marital status:      Spouse name: None     Number of children: None     Years of education: None     Highest education level: None   Occupational History     Occupation: [employer]   Social Needs     Financial resource strain: None     Food insecurity:     Worry: None     Inability: None     Transportation needs:     Medical: None     Non-medical: None   Tobacco Use     Smoking status: Former Smoker     Packs/day: 0.50     Years: 30.00     Pack years: 15.00     Types: Cigarettes     Last attempt to quit: 2017     Years since quittin.3     Smokeless tobacco: Never Used   Substance and Sexual Activity     Alcohol use: No     Drug use: No     Sexual activity: Not Currently   Lifestyle     Physical activity:     Days per week: None     Minutes per session: None     Stress: None   Relationships     Social connections:     Talks on phone: None     Gets together: None     Attends Islam service: None     Active member of club or organization: None     Attends meetings of clubs or organizations: None     Relationship status: None     Intimate partner violence:     Fear of current or ex partner: None     Emotionally abused: None     Physically abused: None     Forced sexual activity: None   Other Topics Concern      "Parent/sibling w/ CABG, MI or angioplasty before 65F 55M? Not Asked   Social History Narrative    . Remarried.    4 children with first marriage.    2 grand children.    Walks daily, but no formal exercise.        ROS:  Constitutional: No recent fever, chills, or sweats. No significant weight gain/loss.   ENT: No epistaxis.  Allergies/Immunologic: As above.   Respiratory: No hemoptysis.   Cardiovascular: As per HPI.   GI: No hematemesis, melena, or hematochezia.   : No hematuria.   Skin: No petechia or ecchymosis.   Endocrinology: Positive for diabetes  Musculoskeletal: No myalgia.    VITAL SIGNS:  BP (!) 155/110 (BP Location: Left arm, Patient Position: Chair, Cuff Size: Adult Regular)   Pulse 79   Ht 1.575 m (5' 2\")   Wt 64.3 kg (141 lb 11.2 oz)   SpO2 97%   BMI 25.92 kg/m      Body mass index is 25.92 kg/m .  Wt Readings from Last 2 Encounters:   10/16/19 64.3 kg (141 lb 11.2 oz)   09/03/19 62.6 kg (138 lb)       PHYSICAL EXAM  Luke Henao is a 53 year old male in no acute distress.  HEENT: Unremarkable.  Neck: JVP normal.  Carotids bilaterally without bruits.  Lungs: CTA.  Cor: RRR. Normal S1 and S2.  No murmur, rub, or gallop.   Abd: Soft, nontender, nondistended.  NABS.  No pulsatile mass.  Extremities: No C/C/E.  Pulses +symmetric in upper and lower extremities.  Neuro: Grossly intact.    LABS    Lab Results   Component Value Date    WBC 10.7 10/01/2018     Lab Results   Component Value Date    RBC 4.97 10/01/2018     Lab Results   Component Value Date    HGB 14.9 10/01/2018     Lab Results   Component Value Date    HCT 46.3 10/01/2018     No components found for: MCT  Lab Results   Component Value Date    MCV 93 10/01/2018     Lab Results   Component Value Date    MCH 30.0 10/01/2018     Lab Results   Component Value Date    MCHC 32.2 10/01/2018     Lab Results   Component Value Date    RDW 14.6 10/01/2018     Lab Results   Component Value Date     10/01/2018      Recent Labs   Lab Test " 05/01/19  0849 04/02/19  1113    137   POTASSIUM 4.2 4.5   CHLORIDE 104 106   CO2 26 22   ANIONGAP 8 9   * 140*   BUN 24 31*   CR 1.12 1.15   ROB 9.1 8.7     Recent Labs   Lab Test 10/22/18  0909 10/01/18  1018   CHOL 109 135   HDL 31* 31*   LDL 50 74   TRIG 139 148   NHDL 78 104        EKG: Today with SR 79 bpm and inferior Q wave   2/28/19 with SR and inferior Q wave     ECHO: 5/1/19  Interpretation Summary  Severely (EF 20-25%) reduced left ventricular function is present. Severe  diffuse hypokinesis is present.  Right ventricular function, chamber size, wall motion, and thickness are  normal.  S/P MitraClip with trace residual MR and no significant stenosis (MG 2.5 mmHg  at 72 bpm.)  Moderate tricuspid insufficiency is present.  Right ventricular systolic pressure is 41mmHg above the right atrial pressure.  The inferior vena cava was normal in size with preserved respiratory  variability.  This study was compared to a prior TTE from 4/18/2018. LVEF has decreased  slightly.        ASSESSMENT AND PLAN:    1. Ischemic cardiomyopathy    2. History of mitral valve repair with mitral clip 2017    3. Coronary artery disease involving native coronary artery of native heart without angina pectoris    4. Cardiogenic shock (H)    5. Chronic systolic congestive heart failure (H)      53-year-old male with ischemic cardiomyopathy status post acute myocardial infarction in 2017 that was complicated with cardiogenic shock and mitral regurgitation.  Also with mitral clip and ICD.  Followed CORE clinic.     No change in symptoms and able to perform ADL.     CAD. No new EKG findings and tolerating aspirin/statin treatment. Chronic thigh pain possible related to statin therapy and advised to try CoQ10 100-200 mg/day.     CHF. HTN today but recently had uptitration of metoprolol and planning on trip in few weeks. Needs to continue with metoprolol/Entresto/Bumex as before. Might consider further uptitration during  follow up with CORE clinic or would add amlodipine.       Amanda Amaral MD    Division of Cardiology  [facility] [facility] [address] [address] [phone] Appointments  [phone] Fax  [phone] After hours    Clinic nurse:  Agustin Antony LPN   Nurse Care Coordinator- Heart Care   [phone] option 1, than option 3    Academic Mailing address:  [facility]  Department of Internal Medicine ()  [address] [place] [id number]

## 2019-10-17 LAB — INTERPRETATION ECG - MUSE: NORMAL

## 2019-10-17 RX ORDER — BUMETANIDE 0.5 MG/1
1 TABLET ORAL DAILY
Qty: 45 TABLET | Refills: 3 | OUTPATIENT
Start: 2019-10-17

## 2019-11-05 ENCOUNTER — TRANSFERRED RECORDS (OUTPATIENT)
Dept: HEALTH INFORMATION MANAGEMENT | Facility: CLINIC | Age: 54
End: 2019-11-05

## 2019-12-06 ENCOUNTER — OFFICE VISIT (OUTPATIENT)
Dept: URGENT CARE | Facility: URGENT CARE | Age: 54
End: 2019-12-06
Payer: COMMERCIAL

## 2019-12-06 VITALS
SYSTOLIC BLOOD PRESSURE: 115 MMHG | OXYGEN SATURATION: 98 % | BODY MASS INDEX: 25.79 KG/M2 | TEMPERATURE: 97.9 F | WEIGHT: 141 LBS | DIASTOLIC BLOOD PRESSURE: 78 MMHG | RESPIRATION RATE: 16 BRPM | HEART RATE: 78 BPM

## 2019-12-06 DIAGNOSIS — J02.9 PHARYNGITIS, UNSPECIFIED ETIOLOGY: Primary | ICD-10-CM

## 2019-12-06 LAB
DEPRECATED S PYO AG THROAT QL EIA: NORMAL
FLUAV+FLUBV AG SPEC QL: NEGATIVE
FLUAV+FLUBV AG SPEC QL: NEGATIVE
SPECIMEN SOURCE: NORMAL
SPECIMEN SOURCE: NORMAL

## 2019-12-06 PROCEDURE — 87081 CULTURE SCREEN ONLY: CPT | Performed by: FAMILY MEDICINE

## 2019-12-06 PROCEDURE — 99214 OFFICE O/P EST MOD 30 MIN: CPT | Performed by: FAMILY MEDICINE

## 2019-12-06 PROCEDURE — 87804 INFLUENZA ASSAY W/OPTIC: CPT | Performed by: FAMILY MEDICINE

## 2019-12-06 PROCEDURE — 87880 STREP A ASSAY W/OPTIC: CPT | Performed by: FAMILY MEDICINE

## 2019-12-06 RX ORDER — AZITHROMYCIN 250 MG/1
TABLET, FILM COATED ORAL
Qty: 6 TABLET | Refills: 0 | Status: SHIPPED | OUTPATIENT
Start: 2019-12-06 | End: 2020-01-09

## 2019-12-06 RX ORDER — CODEINE PHOSPHATE AND GUAIFENESIN 10; 100 MG/5ML; MG/5ML
1-2 SOLUTION ORAL EVERY 4 HOURS PRN
Qty: 120 ML | Refills: 0 | Status: SHIPPED | OUTPATIENT
Start: 2019-12-06 | End: 2021-05-19

## 2019-12-06 ASSESSMENT — ENCOUNTER SYMPTOMS
RHINORRHEA: 1
COUGH: 1

## 2019-12-06 NOTE — PROGRESS NOTES
SUBJECTIVE:   Luke Henao is a 54 year old male presenting with a chief complaint of   Chief Complaint   Patient presents with     URI     cough, runny nose, sore throat X 3 days   His cough is been nonproductive he has not had a fever has pain in his lower ribs because of all the coughing.    He denies any fevers no weakness his throat is very sore complains of having some congestion.  He is not complaining of sinus tenderness    He is an established patient of [place].    URI Adult    Onset of symptoms was 3 day(s) ago.  Course of illness is worsening.    Severity moderate  Current and Associated symptoms: cough - productive  Treatment measures tried include Tylenol/Ibuprofen.  Predisposing factors include None.        Review of Systems   HENT: Positive for congestion, postnasal drip and rhinorrhea.    Respiratory: Positive for cough.    All other systems reviewed and are negative.      Past Medical History:   Diagnosis Date     CAD (coronary artery disease) 2017    RCA STEMI s/p balloon angioplasty and multiple Sofie to RCA, LCx, and LAD     DVT (deep venous thrombosis) (H) 2017    left internal jugular (line-associated); left common femoral; on coumadin     Ischemic cardiomyopathy 2017    EF 30-35%     Ischemic stroke (H) 2017    no residual deficits     Lower GI bleed 2017    due to rectal ulcer     Mitral valve insufficiency, ischemic 2017    s/p Mitral Clip placement      Type 2 diabetes mellitus (H)      Family History   Problem Relation Age of Onset     Hypertension Mother      Diabetes Type 2  Father      Hypertension Father      Myocardial Infarction No family hx of      Cerebrovascular Disease No family hx of      Coronary Artery Disease Early Onset No family hx of      Colon Cancer No family hx of      Prostate Cancer No family hx of      Current Outpatient Medications   Medication Sig Dispense Refill     aspirin ([employer]S ASPIRIN ADULT LOW DOSE) 81 MG chewable tablet Take 1 tablet (81 mg) by mouth daily 90  tablet 0     atorvastatin (LIPITOR) 40 MG tablet Take 1 tablet (40 mg) by mouth daily 90 tablet 3     bumetanide (BUMEX) 0.5 MG tablet Take 2 tabs daily until weight down 3 lbs. Then reduce dose to 0.5 mg daily. 45 tablet 11     cetirizine (ZYRTEC) 10 MG tablet TAKE 1 TABLET (10 MG) BY MOUTH DAILY 60 tablet 10     glipiZIDE (GLUCOTROL) 5 MG tablet TAKE 1 TABLET (5 MG) BY MOUTH 2 TIMES DAILY (BEFORE MEALS) 180 tablet 3     metFORMIN (GLUCOPHAGE-XR) 500 MG 24 hr tablet Take 2 tablets (1,000 mg) by mouth daily (with dinner) 180 tablet 3     metoprolol succinate ER (TOPROL-XL) 200 MG 24 hr tablet Take 1 tablet (200 mg) by mouth daily 90 tablet 3     ONETOUCH ULTRA test strip USE TO TEST BLOOD SUGAR 3 TIMES DAILY OR AS DIRECTED. 100 strip 3     order for DME Equipment being ordered: wrist-thumb immobilizing splint. 1 Units 0     sacubitril-valsartan (ENTRESTO) 49-51 MG per tablet Take 1 tablet by mouth 2 times daily 180 tablet 3     sildenafil (VIAGRA) 50 MG tablet Take 1 tablet (50 mg) by mouth daily as needed (30 minutes prior to intercourse) 20 tablet 3     Social History     Tobacco Use     Smoking status: Former Smoker     Packs/day: 0.50     Years: 30.00     Pack years: 15.00     Types: Cigarettes     Last attempt to quit: 2017     Years since quittin.9     Smokeless tobacco: Never Used   Substance Use Topics     Alcohol use: No       OBJECTIVE  /78   Pulse 78   Temp 97.9  F (36.6  C) (Oral)   Resp 16   Wt 64 kg (141 lb)   SpO2 98%   BMI 25.79 kg/m       Physical Exam  Vitals signs reviewed.   Constitutional:       Appearance: Normal appearance.   HENT:      Ears:      Comments: Tympanic membrane's are clear inferior turbinates are enlarged bilaterally there is a purulent drainage on the right.  Oropharynx is slightly erythematous  Eyes:      Extraocular Movements: Extraocular movements intact.   Neck:      Musculoskeletal: Normal range of motion and neck supple.   Cardiovascular:      Rate and  Rhythm: Regular rhythm.      Pulses: Normal pulses.      Heart sounds: Normal heart sounds.   Pulmonary:      Effort: Pulmonary effort is normal.      Breath sounds: Normal breath sounds.   Abdominal:      Palpations: Abdomen is soft.   Musculoskeletal: Normal range of motion.   Skin:     General: Skin is warm.   Neurological:      Mental Status: He is oriented to person, place, and time.   Psychiatric:         Mood and Affect: Mood normal.         Behavior: Behavior normal.         Labs:  Results for orders placed or performed in visit on 12/06/19 (from the past 24 hour(s))   Influenza A/B antigen   Result Value Ref Range    Influenza A/B Agn Specimen Nasal     Influenza A Negative NEG^Negative    Influenza B Negative NEG^Negative   Strep, Rapid Screen   Result Value Ref Range    Specimen Description Throat     Rapid Strep A Screen       NEGATIVE: No Group A streptococcal antigen detected by immunoassay, await culture report.       X-Ray was not done.    ASSESSMENT:      ICD-10-CM    1. Pharyngitis, unspecified etiology J02.9 Influenza A/B antigen     Strep, Rapid Screen     Beta strep group A culture      Appears to be a bacterial infection I think this is more sinus.    His cough is substantial he is hurting his ribs  Medical Decision Making:    Differential Diagnosis:  Bronchitis, sinusitis, pneumonia,    Serious Comorbid Conditions:  Adult: Diabetic    PLAN:    1.  Azithromycin  2.  Cough medication    Followup:    Follow-up primary care in 1 week    Patient Instructions   1.  Increase her fluids on a daily basis  2.  Antibiotics of azithromycin  3.  Cough medication    4.  Return to your primary care in 1 week if you are not better substantially

## 2019-12-06 NOTE — PATIENT INSTRUCTIONS
1.  Increase her fluids on a daily basis  2.  Antibiotics of azithromycin  3.  Cough medication    4.  Return to your primary care in 1 week if you are not better substantially

## 2019-12-07 DIAGNOSIS — E11.9 TYPE 2 DIABETES MELLITUS WITHOUT COMPLICATION, WITHOUT LONG-TERM CURRENT USE OF INSULIN (H): ICD-10-CM

## 2019-12-07 LAB
BACTERIA SPEC CULT: NORMAL
SPECIMEN SOURCE: NORMAL

## 2019-12-08 NOTE — TELEPHONE ENCOUNTER
"Requested Prescriptions   Pending Prescriptions Disp Refills     glipiZIDE (GLUCOTROL) 5 MG tablet [Pharmacy Med Name: GLIPIZIDE 5 MG TABLET] 180 tablet 3     Sig: TAKE 1 TABLET (5 MG) BY MOUTH 2 TIMES DAILY (BEFORE MEALS)   Last Written Prescription Date:  12/6/2018  Last Fill Quantity: 180,  # refills: 3   Last Office Visit: 6/25/2019   Future Office Visit:         Sulfonylurea Agents Failed - 12/7/2019  9:35 AM        Failed - Patient has documented LDL within the past 12 mos.     Recent Labs   Lab Test 10/22/18  0909   LDL 50             Failed - Patient has had a Microalbumin in the past 15 mos.     No lab results found.          Failed - Patient has documented A1c within the specified period of time.     If HgbA1C is 8 or greater, it needs to be on file within the past 3 months.  If less than 8, must be on file within the past 6 months.     Recent Labs   Lab Test 04/02/19  1113   A1C 6.8*             Passed - Blood pressure less than 140/90 in past 6 months     BP Readings from Last 3 Encounters:   12/06/19 115/78   10/16/19 (!) 155/110   09/03/19 (!) 157/93                 Passed - Medication is active on med list        Passed - Patient is age 18 or older        Passed - Patient has a recent creatinine (normal) within the past 12 mos.     Recent Labs   Lab Test 10/16/19  0818  03/26/17  1812   CR 1.01   < >  --    CREAT  --   --  2.4*    < > = values in this interval not displayed.             Passed - Recent (6 mo) or future (30 days) visit within the authorizing provider's specialty     Patient had office visit in the last 6 months or has a visit in the next 30 days with authorizing provider or within the authorizing provider's specialty.  See \"Patient Info\" tab in inbasket, or \"Choose Columns\" in Meds & Orders section of the refill encounter.              "

## 2019-12-10 RX ORDER — GLIPIZIDE 5 MG/1
TABLET ORAL
Qty: 180 TABLET | Refills: 0 | Status: SHIPPED | OUTPATIENT
Start: 2019-12-10 | End: 2020-03-31

## 2019-12-10 NOTE — TELEPHONE ENCOUNTER
Routing refill request to provider for review/approval because:  Labs out of range:  A1C  Labs not current:  LDL, microalbumin, A1C  Patient needs to be seen because:  Overdue for office visit  Review BPs

## 2019-12-11 DIAGNOSIS — Z91.09 ENVIRONMENTAL ALLERGIES: ICD-10-CM

## 2019-12-11 RX ORDER — CETIRIZINE HYDROCHLORIDE 10 MG/1
TABLET ORAL
Qty: 60 TABLET | Refills: 6 | Status: SHIPPED | OUTPATIENT
Start: 2019-12-11 | End: 2021-03-02

## 2019-12-11 NOTE — TELEPHONE ENCOUNTER
"Requested Prescriptions   Pending Prescriptions Disp Refills     cetirizine (ZYRTEC) 10 MG tablet [Pharmacy Med Name: CETIRIZINE HCL 10 MG TABLET] 60 tablet 10     Sig: TAKE 1 TABLET (10 MG) BY MOUTH DAILY       Antihistamines Protocol Passed - 12/11/2019  4:36 AM        Passed - Patient is 3-64 years of age     Apply weight-based dosing for peds patients age 3 - 12 years of age.    Forward request to provider for patients under the age of 3 or over the age of 64.          Passed - Recent (12 mo) or future (30 days) visit within the authorizing provider's specialty     Patient has had an office visit with the authorizing provider or a provider within the authorizing providers department within the previous 12 mos or has a future within next 30 days. See \"Patient Info\" tab in inbasket, or \"Choose Columns\" in Meds & Orders section of the refill encounter.              Passed - Medication is active on med list          "

## 2020-01-02 DIAGNOSIS — I50.22 CHRONIC SYSTOLIC CONGESTIVE HEART FAILURE (H): Primary | ICD-10-CM

## 2020-01-07 ENCOUNTER — OFFICE VISIT (OUTPATIENT)
Dept: CARDIOLOGY | Facility: CLINIC | Age: 55
End: 2020-01-07
Attending: NURSE PRACTITIONER
Payer: COMMERCIAL

## 2020-01-07 ENCOUNTER — ANCILLARY PROCEDURE (OUTPATIENT)
Dept: CARDIOLOGY | Facility: CLINIC | Age: 55
End: 2020-01-07
Attending: INTERNAL MEDICINE
Payer: COMMERCIAL

## 2020-01-07 VITALS
OXYGEN SATURATION: 96 % | DIASTOLIC BLOOD PRESSURE: 89 MMHG | HEIGHT: 62 IN | BODY MASS INDEX: 26.09 KG/M2 | WEIGHT: 141.8 LBS | HEART RATE: 74 BPM | SYSTOLIC BLOOD PRESSURE: 138 MMHG

## 2020-01-07 DIAGNOSIS — I10 BENIGN ESSENTIAL HYPERTENSION: ICD-10-CM

## 2020-01-07 DIAGNOSIS — Z98.890 HISTORY OF MITRAL VALVE REPAIR: ICD-10-CM

## 2020-01-07 DIAGNOSIS — I34.0 MITRAL VALVE INSUFFICIENCY, UNSPECIFIED ETIOLOGY: ICD-10-CM

## 2020-01-07 DIAGNOSIS — I25.10 CORONARY ARTERY DISEASE INVOLVING NATIVE CORONARY ARTERY OF NATIVE HEART WITHOUT ANGINA PECTORIS: ICD-10-CM

## 2020-01-07 DIAGNOSIS — I50.22 CHRONIC SYSTOLIC CONGESTIVE HEART FAILURE (H): ICD-10-CM

## 2020-01-07 DIAGNOSIS — I50.23 ACUTE ON CHRONIC SYSTOLIC (CONGESTIVE) HEART FAILURE (H): Primary | ICD-10-CM

## 2020-01-07 DIAGNOSIS — I25.5 ISCHEMIC CARDIOMYOPATHY: ICD-10-CM

## 2020-01-07 DIAGNOSIS — I42.9 CARDIOMYOPATHY (H): ICD-10-CM

## 2020-01-07 LAB
ANION GAP SERPL CALCULATED.3IONS-SCNC: 5 MMOL/L (ref 3–14)
BUN SERPL-MCNC: 19 MG/DL (ref 7–30)
CALCIUM SERPL-MCNC: 8.6 MG/DL (ref 8.5–10.1)
CHLORIDE SERPL-SCNC: 105 MMOL/L (ref 94–109)
CO2 SERPL-SCNC: 27 MMOL/L (ref 20–32)
CREAT SERPL-MCNC: 1.04 MG/DL (ref 0.66–1.25)
GFR SERPL CREATININE-BSD FRML MDRD: 82 ML/MIN/{1.73_M2}
GLUCOSE SERPL-MCNC: 138 MG/DL (ref 70–99)
POTASSIUM SERPL-SCNC: 4 MMOL/L (ref 3.4–5.3)
SODIUM SERPL-SCNC: 137 MMOL/L (ref 133–144)

## 2020-01-07 PROCEDURE — 80048 BASIC METABOLIC PNL TOTAL CA: CPT | Performed by: NURSE PRACTITIONER

## 2020-01-07 PROCEDURE — 99214 OFFICE O/P EST MOD 30 MIN: CPT | Mod: ZP | Performed by: NURSE PRACTITIONER

## 2020-01-07 PROCEDURE — G0463 HOSPITAL OUTPT CLINIC VISIT: HCPCS | Mod: 25,ZF

## 2020-01-07 PROCEDURE — 36415 COLL VENOUS BLD VENIPUNCTURE: CPT | Performed by: NURSE PRACTITIONER

## 2020-01-07 RX ORDER — UBIDECARENONE 100 MG
CAPSULE ORAL DAILY
COMMUNITY

## 2020-01-07 ASSESSMENT — PAIN SCALES - GENERAL: PAINLEVEL: NO PAIN (0)

## 2020-01-07 ASSESSMENT — MIFFLIN-ST. JEOR: SCORE: 1372.45

## 2020-01-07 NOTE — PATIENT INSTRUCTIONS
Take your medicines every day, as directed    Changes made today:  o No medication changes.   o    Monitor Your Weight and Symptoms    Contact us if you:      Gain 2 pounds in one day or 5 pounds in one week    Feel more short of breath    Notice more leg swelling    Feel lightheadeded   Change your lifestyle    Limit Salt or Sodium:    2000 mg  Limit Fluids:    2000 mL or approximately 64 ounces  Eat a Heart Healthy Diet    Low in saturated fats  Stay Active:    Aim to move at least 150 minutes every  week         To Contact us    During Business Hours:  [phone], option # 1 (University)  Then option # 4 (medical questions)     After hours, weekends or holidays:   [phone], Option #4  Ask to speak to the On-Call Cardiologist. Inform them you are a CORE/heart failure patient at the [facility].     Use [place] allows you to communicate directly with your heart team through secure messaging.    [facility] [employer] can be accessed any time on your phone, computer, or tablet.    If you need assistance, we'd be happy to help!         Keep your Heart Appointments:    1.  Dr. Harris in April  2.  CORE in July.

## 2020-01-07 NOTE — NURSING NOTE
Diet: Patient instructed regarding a heart healthy diet, including discussion of reduced fat and sodium intake. Patient demonstrated understanding of this information and agreed to call with further questions or concerns.  Labs: Patient was given results of the laboratory testing obtained today. Patient was instructed to return for the next laboratory testing with next appointment . Patient demonstrated understanding of this information and agreed to call with further questions or concerns.   Return Appointment: Patient given instructions regarding scheduling next C[facility] clinic [place] [facility] [place]. Harris in April. Patient demonstrated understanding of this information and agreed to call with further questions or concerns.  Patient given a new entresto copay card to use for the new year.   Patient stated he understood all health information given and agreed to call with further questions or concerns.  Kaylah Parker RN Care Coordinator  Heart Failure CORE

## 2020-01-07 NOTE — NURSING NOTE
Chief Complaint   Patient presents with     Follow Up     Return CORE; 54 year old male with chronic systolic heart failure presents for follow up with labs and device check prior.      Vitals were taken and medications were reconciled.     Marie Sylvester CMA    11:26 AM

## 2020-01-07 NOTE — PATIENT INSTRUCTIONS
It was a pleasure to see you in clinic today.  Please do not hesitate to call with any questions or concerns.  You are scheduled for a remote transmission on 4/7/2020.  We look forward to seeing you in clinic at your next device check in 6 months.    Sade Artis, RN, MS, CCRN  Electrophysiology Nurse Clinician  [facility]    During Business Hours Please Call:  [phone]  After Hours Please Call:  [phone] - select option #4 and ask for job code [phone]

## 2020-01-07 NOTE — PROGRESS NOTES
CIRILO Butler is a 54 year old gentleman with a past medical history of ICM,(EF of 20-25%),  CAD with a RCA STEMI s/p PCI x 7 to RCA, left circumflex (now ), and LAD on 3/27/17 complicated by PEDRO, sepsis, lower GI bleed,sacral ulcer, DVT, HTN, bilateral hemispheric infarcts secondary to watershed ischemia, and cardiogenic shock s/p IABP and subclavian balloon pump.  He also underwent mitral clip for MR.  He returns to clinic for routine follow up. Professional  is present today.    Luke is feeling well.  He feels stronger. He denies SOB, RAYMOND, PND, orthopnea, edema, weight gain, chest pain, palpitations, lightheadedness, dizziness, near syncopal/syncopal episodes.  He is able to go up a flight of stairs without difficulty. He is following his sodium and fluid restrictions. He drinks at least 2 bottles or more of water daily. He denies early satiety.  He is able to stand on his feet at work all day without difficulty.  He does get tired at the end of the day and needs to rest when he gets home. He is very active and walks frequently.  BP's have been controlled at home in the 118/78 range.    PMH  Past Medical History:   Diagnosis Date     CAD (coronary artery disease) 2017    RCA STEMI s/p balloon angioplasty and multiple Sofie to RCA, LCx, and LAD     DVT (deep venous thrombosis) (H) 2017    left internal jugular (line-associated); left common femoral; on coumadin     Hypertension      Ischemic cardiomyopathy 2017    EF 30-35%     Ischemic stroke (H) 2017    no residual deficits     Lower GI bleed 2017    due to rectal ulcer     Mitral valve insufficiency, ischemic 2017    s/p Mitral Clip placement      Systolic heart failure (H)      Type 2 diabetes mellitus (H)        Past Surgical History:   Procedure Laterality Date     C INSERT ELECTRD LEADS/REPOSTION  10/27/2017          COLONOSCOPY N/A 4/17/2017    Procedure: COLONOSCOPY;  Surgeon: Rashaad Bundy MD;  Location: [facility]     INSERT INTRAAORTIC  BALLOON PUMP Right 4/19/2017    Procedure: INSERT INTRAAORTIC BALLOON PUMP;  Right Subclavian Intra Aortic Balloon Pump Insertion using Maquet 40cc Ballon Catheter, Implentation of 8mm Gelweave Woven Vascular Prosthesis, Removal of Left Femoral Ballon Pump Catheter, Flouroscopy;  Surgeon: Keshav Leung MD;  Location: [facility]     PERCUTANEOUS MITRAL VALVE REPAIR N/A 5/1/2017    Procedure: PERCUTANEOUS MITRAL VALVE REPAIR ANESTHESIA;  Mitraclip Procedure Possible Cardiopulmonary Bypass ;  Surgeon: Ron Cortez MD;  Location: [facility]     SUBCLAVIAN AORTIC VALVE IMPLANT N/A 5/8/2017    Procedure: SUBCLAVIAN AORTIC VALVE IMPLANT;  Right Subclavian Graft Removal ;  Surgeon: Keshav Leung MD;  Location: [facility]       Family History   Problem Relation Age of Onset     Hypertension Mother      Diabetes Type 2  Father      Hypertension Father      Myocardial Infarction No family hx of      Cerebrovascular Disease No family hx of      Coronary Artery Disease Early Onset No family hx of      Colon Cancer No family hx of      Prostate Cancer No family hx of        Social History     Socioeconomic History     Marital status:      Spouse name: None     Number of children: None     Years of education: None     Highest education level: None   Occupational History     Occupation: [employer]   Social Needs     Financial resource strain: None     Food insecurity:     Worry: None     Inability: None     Transportation needs:     Medical: None     Non-medical: None   Tobacco Use     Smoking status: Former Smoker     Packs/day: 0.50     Years: 30.00     Pack years: 15.00     Types: Cigarettes     Last attempt to quit: 1/1/2017     Years since quitting: 3.0     Smokeless tobacco: Never Used   Substance and Sexual Activity     Alcohol use: No     Drug use: No     Sexual activity: Not Currently   Lifestyle     Physical activity:     Days per week: None     Minutes per session: None     Stress: None    Relationships     Social connections:     Talks on phone: None     Gets together: None     Attends Moravian service: None     Active member of club or organization: None     Attends meetings of clubs or organizations: None     Relationship status: None     Intimate partner violence:     Fear of current or ex partner: None     Emotionally abused: None     Physically abused: None     Forced sexual activity: None   Other Topics Concern     Parent/sibling w/ CABG, MI or angioplasty before 65F 55M? Not Asked   Social History Narrative    . Remarried.    4 children with first marriage.    2 grand children.    Walks daily, but no formal exercise.        ALLERGIES  Allergies   Allergen Reactions     Seasonal Allergies Difficulty breathing       MEDICATIONS aspirin (CVS ASPIRIN ADULT LOW DOSE) 81 MG chewable tablet, Take 1 tablet (81 mg) by mouth daily  atorvastatin (LIPITOR) 40 MG tablet, Take 1 tablet (40 mg) by mouth daily  bumetanide (BUMEX) 0.5 MG tablet, Take 2 tabs daily until weight down 3 lbs. Then reduce dose to 0.5 mg daily.  cetirizine (ZYRTEC) 10 MG tablet, TAKE 1 TABLET (10 MG) BY MOUTH DAILY  co-enzyme Q-10 100 MG CAPS capsule, Take by mouth daily  glipiZIDE (GLUCOTROL) 5 MG tablet, TAKE 1 TABLET (5 MG) BY MOUTH 2 TIMES DAILY (BEFORE MEALS)  guaiFENesin-codeine (ROBITUSSIN AC) 100-10 MG/5ML solution, Take 5-10 mLs by mouth every 4 hours as needed  metFORMIN (GLUCOPHAGE-XR) 500 MG 24 hr tablet, Take 2 tablets (1,000 mg) by mouth daily (with dinner)  metoprolol succinate ER (TOPROL-XL) 200 MG 24 hr tablet, Take 1 tablet (200 mg) by mouth daily  ONETOUCH ULTRA test strip, USE TO TEST BLOOD SUGAR 3 TIMES DAILY OR AS DIRECTED.  order for DME, Equipment being ordered: wrist-thumb immobilizing splint.  sacubitril-valsartan (ENTRESTO) 49-51 MG per tablet, Take 1 tablet by mouth 2 times daily  [] azithromycin (ZITHROMAX) 250 MG tablet, Take 2 tablets (500 mg) by mouth daily for 1 day, THEN 1 tablet  "(250 mg) daily for 4 days.  [] order for DME, Rt wrist splint  sildenafil (VIAGRA) 50 MG tablet, Take 1 tablet (50 mg) by mouth daily as needed (30 minutes prior to intercourse) (Patient not taking: Reported on 2020)    No current facility-administered medications on file prior to visit.       ROS:  Constitutional: No fever, chills, or sweats. No significant weight gain.  ENT: No visual disturbance, ear ache, epistaxis, sore throat.  Respiratory: No cough, hemoptysis.   Cardiovascular: As per HPI.   GI: No nausea, vomiting, hematemesis, melena, or hematochezia.   : No dysuria or hematuria.   Integument: Negative for rashes or ecchymosis.   Psychiatric: Negative for mood changes, anxiety, or depression.   Neuro: Negative for numbness or tingling.   Endocrinology: +DM.   Musculoskeletal: Negative for joint swelling, gait instability, or gout.    EXAM  /89 (BP Location: Right arm, Patient Position: Chair, Cuff Size: Adult Regular)   Pulse 74   Ht 1.575 m (5' 2\")   Wt 64.3 kg (141 lb 12.8 oz)   SpO2 96%   BMI 25.94 kg/m    General: appears comfortable, alert and articulate  Head: normocephalic, atraumatic  Eyes: anicteric sclera, EOMI  Neck: Supple, no adenopathy or masses.  Orophyarynx: moist mucosa, no lesions, dentition intact  Heart: regular, S1/S2, no murmur, gallop, rub, estimated JVP not appreciated  Lungs: Respirations even and unlabored, lungs clear, no rales or wheezing  Abdomen: soft, non-tender, bowel sounds present, no hepatomegaly  Extremities: no clubbing, cyanosis or edema  Neurological: normal speech and affect, no gross motor deficits  Skin:  Warm.  No cyanosis    LABS  Last Comprehensive Metabolic Panel:  Sodium   Date Value Ref Range Status   2020 137 133 - 144 mmol/L Final     Potassium   Date Value Ref Range Status   2020 4.0 3.4 - 5.3 mmol/L Final     Chloride   Date Value Ref Range Status   2020 105 94 - 109 mmol/L Final     Carbon Dioxide   Date Value " Ref Range Status   01/07/2020 27 20 - 32 mmol/L Final     Anion Gap   Date Value Ref Range Status   01/07/2020 5 3 - 14 mmol/L Final     Glucose   Date Value Ref Range Status   01/07/2020 138 (H) 70 - 99 mg/dL Final     Urea Nitrogen   Date Value Ref Range Status   01/07/2020 19 7 - 30 mg/dL Final     Creatinine   Date Value Ref Range Status   01/07/2020 1.04 0.66 - 1.25 mg/dL Final     GFR Estimate   Date Value Ref Range Status   01/07/2020 82 >60 mL/min/[1.73_m2] Final     Comment:     Non  GFR Calc  Starting 12/18/2018, serum creatinine based estimated GFR (eGFR) will be   calculated using the Chronic Kidney Disease Epidemiology Collaboration   (CKD-EPI) equation.       Calcium   Date Value Ref Range Status   01/07/2020 8.6 8.5 - 10.1 mg/dL Final       Lab Results   Component Value Date    NTBNPI 9,984 (H) 04/08/2017     MOST RECENT ECHOCARDIOGRAM 5/1/19:  Interpretation Summary  Severely (EF 20-25%) reduced left ventricular function is present. Severe  diffuse hypokinesis is present.  Right ventricular function, chamber size, wall motion, and thickness are normal. S/P MitraClip with trace residual MR and no significant stenosis (MG 2.5 mmHg at 72 bpm.) Moderate tricuspid insufficiency is present. Right ventricular systolic pressure is 41mmHg above the right atrial pressure. The inferior vena cava was normal in size with preserved respiratory variability. This study was compared to a prior TTE from 4/18/2018. LVEF has decreased  slightly.    ASSESSMENT AND PLAN  Luke Henao is a 54 year old male with ICM who appears euvolemic today.  I didn't make any medication changes today per his request.  He is feeling well on his current regimen. His home BP readings are controlled and have been checked against the office machine for accuracy.  Previous attempts at increasing his Entresto resulted in lightheadedness.      He will follow up with Dr Harris in April with an echocardiogram prior.  He will return  to [facility] in July.    1. Chronic systolic heart failure/HFrEF (EF 20-25% ) secondary to ischemic cardiomyopathy  NYHA Symptom Class II  Stage D  ACE-I/ARB/ARNi: Entresto 49/51 mg twice daily.  Defer increase due to worsening lightheadedness with previous attempts.  BB yes, Toprol  mg daily.   Aldosterone antagonist Defer as previous attempts resulted in worsening renal function.  SCD prophylaxis ICD   Fluid status Euvolemic  Cardiac Rehab: Completed  Sleep Apnea Evaluation: Not indicated, low risk  Antiplatelet: ASA 81 mg daily.  Anticoagulation: None.    2.  CAD:  Stable.  Continue Plavix, ASA, Metoprolol, Entresto, and Atorvastatin      3.  Mitral valve repair s/p mitral clip:  Re-establish care with Dr. Harris as Dr. Amaral has left the practice with an echocardiogram prior.     4. HTN: BP today 138/89.  Home BP readings are controlled. Continue Entresto 49/51 mg twice daily. Patient unwilling to increase at this time as prior attempts resulted in hypotension and lightheadedness.     3.  Follow-up:  Dr. Harris in April with echocardiogram. [facility] in July      Vandana VENEGAS, BRANDON  [facility]

## 2020-01-07 NOTE — LETTER
1/7/2020    RE: Luke Henao  [address]  [place] [address]     Dear Colleague,    Thank you for the opportunity to participate in the care of your patient, Luke Henao, at the [facility]. Please see a copy of my visit note below.    CIRILO Butler is a 54 year old gentleman with a past medical history of ICM,(EF of 20-25%),  CAD with a RCA STEMI s/p PCI x 7 to RCA, left circumflex (now ), and LAD on 3/27/17 complicated by PEDRO, sepsis, lower GI bleed,sacral ulcer, DVT, HTN, bilateral hemispheric infarcts secondary to watershed ischemia, and cardiogenic shock s/p IABP and subclavian balloon pump.  He also underwent mitral clip for MR.  He returns to clinic for routine follow up.  Professional  is present today.    Luke is feeling well.  He feels stronger. He denies SOB, RAYMOND, PND, orthopnea, edema, weight gain, chest pain, palpitations, lightheadedness, dizziness, near syncopal/syncopal episodes.  He is able to go up a flight of stairs without difficulty. He is following his sodium and fluid restrictions. He drinks at least 2 bottles or more of water daily. He denies early satiety.  He is able to stand on his feet at work all day without difficulty.  He does get tired at the end of the day and needs to rest when he gets home. He is very active and walks frequently.  BP's have been controlled at home in the 118/78 range.    PMH  Past Medical History:   Diagnosis Date     CAD (coronary artery disease) 2017    RCA STEMI s/p balloon angioplasty and multiple Sofie to RCA, LCx, and LAD     DVT (deep venous thrombosis) (H) 2017    left internal jugular (line-associated); left common femoral; on coumadin     Hypertension      Ischemic cardiomyopathy 2017    EF 30-35%     Ischemic stroke (H) 2017    no residual deficits     Lower GI bleed 2017    due to rectal ulcer     Mitral valve insufficiency, ischemic 2017    s/p Mitral Clip placement       Systolic heart failure (H)      Type 2 diabetes mellitus (H)        Past Surgical History:   Procedure Laterality Date     C INSERT ELECTRD LEADS/REPOSTION  10/27/2017          COLONOSCOPY N/A 4/17/2017    Procedure: COLONOSCOPY;  Surgeon: Rashaad Bundy MD;  Location: [facility]     INSERT INTRAAORTIC BALLOON PUMP Right 4/19/2017    Procedure: INSERT INTRAAORTIC BALLOON PUMP;  Right Subclavian Intra Aortic Balloon Pump Insertion using Maquet 40cc Ballon Catheter, Implentation of 8mm Gelweave Woven Vascular Prosthesis, Removal of Left Femoral Ballon Pump Catheter, Flouroscopy;  Surgeon: Keshav Leung MD;  Location: [facility]     PERCUTANEOUS MITRAL VALVE REPAIR N/A 5/1/2017    Procedure: PERCUTANEOUS MITRAL VALVE REPAIR ANESTHESIA;  Mitraclip Procedure Possible Cardiopulmonary Bypass ;  Surgeon: Ron Cortez MD;  Location: [facility]     SUBCLAVIAN AORTIC VALVE IMPLANT N/A 5/8/2017    Procedure: SUBCLAVIAN AORTIC VALVE IMPLANT;  Right Subclavian Graft Removal ;  Surgeon: Keshav Leung MD;  Location: [facility]       Family History   Problem Relation Age of Onset     Hypertension Mother      Diabetes Type 2  Father      Hypertension Father      Myocardial Infarction No family hx of      Cerebrovascular Disease No family hx of      Coronary Artery Disease Early Onset No family hx of      Colon Cancer No family hx of      Prostate Cancer No family hx of        Social History     Socioeconomic History     Marital status:      Spouse name: None     Number of children: None     Years of education: None     Highest education level: None   Occupational History     Occupation: [employer]   Social Needs     Financial resource strain: None     Food insecurity:     Worry: None     Inability: None     Transportation needs:     Medical: None     Non-medical: None   Tobacco Use     Smoking status: Former Smoker     Packs/day: 0.50     Years: 30.00     Pack years: 15.00     Types: Cigarettes      Last attempt to quit: 1/1/2017     Years since quitting: 3.0     Smokeless tobacco: Never Used   Substance and Sexual Activity     Alcohol use: No     Drug use: No     Sexual activity: Not Currently   Lifestyle     Physical activity:     Days per week: None     Minutes per session: None     Stress: None   Relationships     Social connections:     Talks on phone: None     Gets together: None     Attends Orthodoxy service: None     Active member of club or organization: None     Attends meetings of clubs or organizations: None     Relationship status: None     Intimate partner violence:     Fear of current or ex partner: None     Emotionally abused: None     Physically abused: None     Forced sexual activity: None   Other Topics Concern     Parent/sibling w/ CABG, MI or angioplasty before 65F 55M? Not Asked   Social History Narrative    . Remarried.    4 children with first marriage.    2 grand children.    Walks daily, but no formal exercise.        ALLERGIES  Allergies   Allergen Reactions     Seasonal Allergies Difficulty breathing       MEDICATIONS aspirin (CVS ASPIRIN ADULT LOW DOSE) 81 MG chewable tablet, Take 1 tablet (81 mg) by mouth daily  atorvastatin (LIPITOR) 40 MG tablet, Take 1 tablet (40 mg) by mouth daily  bumetanide (BUMEX) 0.5 MG tablet, Take 2 tabs daily until weight down 3 lbs. Then reduce dose to 0.5 mg daily.  cetirizine (ZYRTEC) 10 MG tablet, TAKE 1 TABLET (10 MG) BY MOUTH DAILY  co-enzyme Q-10 100 MG CAPS capsule, Take by mouth daily  glipiZIDE (GLUCOTROL) 5 MG tablet, TAKE 1 TABLET (5 MG) BY MOUTH 2 TIMES DAILY (BEFORE MEALS)  guaiFENesin-codeine (ROBITUSSIN AC) 100-10 MG/5ML solution, Take 5-10 mLs by mouth every 4 hours as needed  metFORMIN (GLUCOPHAGE-XR) 500 MG 24 hr tablet, Take 2 tablets (1,000 mg) by mouth daily (with dinner)  metoprolol succinate ER (TOPROL-XL) 200 MG 24 hr tablet, Take 1 tablet (200 mg) by mouth daily  ONETOUCH ULTRA test strip, USE TO TEST BLOOD SUGAR 3 TIMES  "DAILY OR AS DIRECTED.  order for DME, Equipment being ordered: wrist-thumb immobilizing splint.  sacubitril-valsartan (ENTRESTO) 49-51 MG per tablet, Take 1 tablet by mouth 2 times daily  [] azithromycin (ZITHROMAX) 250 MG tablet, Take 2 tablets (500 mg) by mouth daily for 1 day, THEN 1 tablet (250 mg) daily for 4 days.  [] order for DME, Rt wrist splint  sildenafil (VIAGRA) 50 MG tablet, Take 1 tablet (50 mg) by mouth daily as needed (30 minutes prior to intercourse) (Patient not taking: Reported on 2020)    No current facility-administered medications on file prior to visit.       ROS:  Constitutional: No fever, chills, or sweats. No significant weight gain.  ENT: No visual disturbance, ear ache, epistaxis, sore throat.  Respiratory: No cough, hemoptysis.   Cardiovascular: As per HPI.   GI: No nausea, vomiting, hematemesis, melena, or hematochezia.   : No dysuria or hematuria.   Integument: Negative for rashes or ecchymosis.   Psychiatric: Negative for mood changes, anxiety, or depression.   Neuro: Negative for numbness or tingling.   Endocrinology: +DM.   Musculoskeletal: Negative for joint swelling, gait instability, or gout.    EXAM  /89 (BP Location: Right arm, Patient Position: Chair, Cuff Size: Adult Regular)   Pulse 74   Ht 1.575 m (5' 2\")   Wt 64.3 kg (141 lb 12.8 oz)   SpO2 96%   BMI 25.94 kg/m     General: appears comfortable, alert and articulate  Head: normocephalic, atraumatic  Eyes: anicteric sclera, EOMI  Neck: Supple, no adenopathy or masses.  Orophyarynx: moist mucosa, no lesions, dentition intact  Heart: regular, S1/S2, no murmur, gallop, rub, estimated JVP not appreciated  Lungs: Respirations even and unlabored, lungs clear, no rales or wheezing  Abdomen: soft, non-tender, bowel sounds present, no hepatomegaly  Extremities: no clubbing, cyanosis or edema  Neurological: normal speech and affect, no gross motor deficits  Skin:  Warm.  No cyanosis    LABS  Last " Comprehensive Metabolic Panel:  Sodium   Date Value Ref Range Status   01/07/2020 137 133 - 144 mmol/L Final     Potassium   Date Value Ref Range Status   01/07/2020 4.0 3.4 - 5.3 mmol/L Final     Chloride   Date Value Ref Range Status   01/07/2020 105 94 - 109 mmol/L Final     Carbon Dioxide   Date Value Ref Range Status   01/07/2020 27 20 - 32 mmol/L Final     Anion Gap   Date Value Ref Range Status   01/07/2020 5 3 - 14 mmol/L Final     Glucose   Date Value Ref Range Status   01/07/2020 138 (H) 70 - 99 mg/dL Final     Urea Nitrogen   Date Value Ref Range Status   01/07/2020 19 7 - 30 mg/dL Final     Creatinine   Date Value Ref Range Status   01/07/2020 1.04 0.66 - 1.25 mg/dL Final     GFR Estimate   Date Value Ref Range Status   01/07/2020 82 >60 mL/min/[1.73_m2] Final     Comment:     Non  GFR Calc  Starting 12/18/2018, serum creatinine based estimated GFR (eGFR) will be   calculated using the Chronic Kidney Disease Epidemiology Collaboration   (CKD-EPI) equation.       Calcium   Date Value Ref Range Status   01/07/2020 8.6 8.5 - 10.1 mg/dL Final       Lab Results   Component Value Date    NTBNPI 9,984 (H) 04/08/2017     MOST RECENT ECHOCARDIOGRAM 5/1/19:  Interpretation Summary  Severely (EF 20-25%) reduced left ventricular function is present. Severe  diffuse hypokinesis is present.  Right ventricular function, chamber size, wall motion, and thickness are normal. S/P MitraClip with trace residual MR and no significant stenosis (MG 2.5 mmHg at 72 bpm.) Moderate tricuspid insufficiency is present. Right ventricular systolic pressure is 41mmHg above the right atrial pressure. The inferior vena cava was normal in size with preserved respiratory variability. This study was compared to a prior TTE from 4/18/2018. LVEF has decreased  slightly.    ASSESSMENT AND PLAN  Luke Henao is a 54 year old male with ICM who appears euvolemic today.  I didn't make any medication changes today per his request.  He  is feeling well on his current regimen. His home BP readings are controlled and have been checked against the office machine for accuracy.  Previous attempts at increasing his Entresto resulted in lightheadedness.      He will follow up with Dr Harris in April with an echocardiogram prior.  He will return to [facility] in July.    1. Chronic systolic heart failure/HFrEF (EF 20-25% ) secondary to ischemic cardiomyopathy  NYHA Symptom Class II  Stage D  ACE-I/ARB/ARNi: Entresto 49/51 mg twice daily.  Defer increase due to worsening lightheadedness with previous attempts.  BB yes, Toprol  mg daily.   Aldosterone antagonist Defer as previous attempts resulted in worsening renal function.  SCD prophylaxis ICD   Fluid status Euvolemic  Cardiac Rehab: Completed  Sleep Apnea Evaluation: Not indicated, low risk  Antiplatelet: ASA 81 mg daily.  Anticoagulation: None.    2.  CAD:  Stable.  Continue Plavix, ASA, Metoprolol, Entresto, and Atorvastatin      3.  Mitral valve repair s/p mitral clip:   Re-establish care with Dr. Harris as Dr. Amaral has left the practice with an echocardiogram prior.     4. HTN: BP today  138/89.  Home BP readings are controlled. Continue Entresto 49/51 mg twice daily. Patient unwilling to increase at this time as prior attempts resulted in hypotension and lightheadedness.     3.  Follow-up:  Dr. Harris in April with echocardiogram. [facility] in July      Vandana VENEGAS, BRANDON  [facility]

## 2020-01-13 ENCOUNTER — PATIENT OUTREACH (OUTPATIENT)
Dept: CARDIOLOGY | Facility: CLINIC | Age: 55
End: 2020-01-13

## 2020-01-13 LAB
MDC_IDC_LEAD_IMPLANT_DT: NORMAL
MDC_IDC_LEAD_LOCATION: NORMAL
MDC_IDC_LEAD_LOCATION_DETAIL_1: NORMAL
MDC_IDC_LEAD_MFG: NORMAL
MDC_IDC_LEAD_MODEL: NORMAL
MDC_IDC_LEAD_POLARITY_TYPE: NORMAL
MDC_IDC_LEAD_SERIAL: NORMAL
MDC_IDC_LEAD_SPECIAL_FUNCTION: NORMAL
MDC_IDC_MSMT_BATTERY_DTM: NORMAL
MDC_IDC_MSMT_BATTERY_REMAINING_LONGEVITY: 125 MO
MDC_IDC_MSMT_BATTERY_RRT_TRIGGER: 2.73
MDC_IDC_MSMT_BATTERY_STATUS: NORMAL
MDC_IDC_MSMT_BATTERY_VOLTAGE: 3.02 V
MDC_IDC_MSMT_CAP_CHARGE_DTM: NORMAL
MDC_IDC_MSMT_CAP_CHARGE_ENERGY: 18 J
MDC_IDC_MSMT_CAP_CHARGE_TIME: 3.61
MDC_IDC_MSMT_CAP_CHARGE_TYPE: NORMAL
MDC_IDC_MSMT_LEADCHNL_RV_IMPEDANCE_VALUE: 304 OHM
MDC_IDC_MSMT_LEADCHNL_RV_IMPEDANCE_VALUE: 380 OHM
MDC_IDC_MSMT_LEADCHNL_RV_PACING_THRESHOLD_AMPLITUDE: 0.5 V
MDC_IDC_MSMT_LEADCHNL_RV_PACING_THRESHOLD_PULSEWIDTH: 0.4 MS
MDC_IDC_MSMT_LEADCHNL_RV_SENSING_INTR_AMPL: 9.38 MV
MDC_IDC_MSMT_LEADCHNL_RV_SENSING_INTR_AMPL: 9.5 MV
MDC_IDC_PG_IMPLANT_DTM: NORMAL
MDC_IDC_PG_MFG: NORMAL
MDC_IDC_PG_MODEL: NORMAL
MDC_IDC_PG_SERIAL: NORMAL
MDC_IDC_PG_TYPE: NORMAL
MDC_IDC_SESS_CLINIC_NAME: NORMAL
MDC_IDC_SESS_DTM: NORMAL
MDC_IDC_SESS_TYPE: NORMAL
MDC_IDC_SET_BRADY_HYSTRATE: NORMAL
MDC_IDC_SET_BRADY_LOWRATE: 40 {BEATS}/MIN
MDC_IDC_SET_BRADY_MODE: NORMAL
MDC_IDC_SET_LEADCHNL_RV_PACING_AMPLITUDE: 2 V
MDC_IDC_SET_LEADCHNL_RV_PACING_ANODE_ELECTRODE_1: NORMAL
MDC_IDC_SET_LEADCHNL_RV_PACING_ANODE_LOCATION_1: NORMAL
MDC_IDC_SET_LEADCHNL_RV_PACING_CAPTURE_MODE: NORMAL
MDC_IDC_SET_LEADCHNL_RV_PACING_CATHODE_ELECTRODE_1: NORMAL
MDC_IDC_SET_LEADCHNL_RV_PACING_CATHODE_LOCATION_1: NORMAL
MDC_IDC_SET_LEADCHNL_RV_PACING_POLARITY: NORMAL
MDC_IDC_SET_LEADCHNL_RV_PACING_PULSEWIDTH: 0.4 MS
MDC_IDC_SET_LEADCHNL_RV_SENSING_ANODE_ELECTRODE_1: NORMAL
MDC_IDC_SET_LEADCHNL_RV_SENSING_ANODE_LOCATION_1: NORMAL
MDC_IDC_SET_LEADCHNL_RV_SENSING_CATHODE_ELECTRODE_1: NORMAL
MDC_IDC_SET_LEADCHNL_RV_SENSING_CATHODE_LOCATION_1: NORMAL
MDC_IDC_SET_LEADCHNL_RV_SENSING_POLARITY: NORMAL
MDC_IDC_SET_LEADCHNL_RV_SENSING_SENSITIVITY: 0.3 MV
MDC_IDC_SET_ZONE_DETECTION_BEATS_DENOMINATOR: 40 {BEATS}
MDC_IDC_SET_ZONE_DETECTION_BEATS_NUMERATOR: 30 {BEATS}
MDC_IDC_SET_ZONE_DETECTION_INTERVAL: 300 MS
MDC_IDC_SET_ZONE_DETECTION_INTERVAL: 360 MS
MDC_IDC_SET_ZONE_DETECTION_INTERVAL: 390 MS
MDC_IDC_SET_ZONE_DETECTION_INTERVAL: NORMAL
MDC_IDC_SET_ZONE_TYPE: NORMAL
MDC_IDC_STAT_AT_BURDEN_PERCENT: 0 %
MDC_IDC_STAT_AT_DTM_END: NORMAL
MDC_IDC_STAT_AT_DTM_START: NORMAL
MDC_IDC_STAT_BRADY_DTM_END: NORMAL
MDC_IDC_STAT_BRADY_DTM_START: NORMAL
MDC_IDC_STAT_BRADY_RV_PERCENT_PACED: 0.01 %
MDC_IDC_STAT_EPISODE_RECENT_COUNT: 0
MDC_IDC_STAT_EPISODE_RECENT_COUNT_DTM_END: NORMAL
MDC_IDC_STAT_EPISODE_RECENT_COUNT_DTM_START: NORMAL
MDC_IDC_STAT_EPISODE_TOTAL_COUNT: 0
MDC_IDC_STAT_EPISODE_TOTAL_COUNT_DTM_END: NORMAL
MDC_IDC_STAT_EPISODE_TOTAL_COUNT_DTM_START: NORMAL
MDC_IDC_STAT_EPISODE_TYPE: NORMAL
MDC_IDC_STAT_TACHYTHERAPY_ATP_DELIVERED_RECENT: 0
MDC_IDC_STAT_TACHYTHERAPY_ATP_DELIVERED_TOTAL: 0
MDC_IDC_STAT_TACHYTHERAPY_RECENT_DTM_END: NORMAL
MDC_IDC_STAT_TACHYTHERAPY_RECENT_DTM_START: NORMAL
MDC_IDC_STAT_TACHYTHERAPY_SHOCKS_ABORTED_RECENT: 0
MDC_IDC_STAT_TACHYTHERAPY_SHOCKS_ABORTED_TOTAL: 0
MDC_IDC_STAT_TACHYTHERAPY_SHOCKS_DELIVERED_RECENT: 0
MDC_IDC_STAT_TACHYTHERAPY_SHOCKS_DELIVERED_TOTAL: 0
MDC_IDC_STAT_TACHYTHERAPY_TOTAL_DTM_END: NORMAL
MDC_IDC_STAT_TACHYTHERAPY_TOTAL_DTM_START: NORMAL

## 2020-01-13 NOTE — PROGRESS NOTES
Patient scheduled for follow up 4/15 with Dr Harris. Due for an echo. Order placed by provider. Added on echo appt same day at 9:00. Called patient with . Left voicemail via  to come at 9:00 for echo prior to appt with Dr Harris.   Asked for call back if this time does not work. Also mailed out appointment itinerary.   Soraya Messina RN

## 2020-01-16 DIAGNOSIS — I25.10 CORONARY ARTERY DISEASE INVOLVING NATIVE CORONARY ARTERY OF NATIVE HEART WITHOUT ANGINA PECTORIS: ICD-10-CM

## 2020-01-17 NOTE — TELEPHONE ENCOUNTER
[facility] ASPIRIN 81 MG   Last Written Prescription Date:  10/16/19  Last Fill Quantity: 90,   # refills: 0  Last Office Visit : 1/4/20  Future Office visit:  4/15/20    Routing refill request to provider for review/approval because:  Drug not on the  refill protocol

## 2020-01-20 RX ORDER — LORATADINE 10 MG
TABLET ORAL
Qty: 90 TABLET | Refills: 0 | Status: SHIPPED | OUTPATIENT
Start: 2020-01-20 | End: 2020-04-22

## 2020-01-20 NOTE — LETTER
6/8/2017      RE: Luke Henao  [address]  [facility] [address]       Dear Colleague,    Thank you for the opportunity to participate in the care of your patient, Luke Henao, at the [facility] HEART [facility]. Please see a copy of my visit note below.    HPI: Mr. Luke Henao is a 51-year-old [place] gentleman with Hx of tobacco use and DM, who presented to [facility] 03/26 with RCA STEMI transferred to [facility] after POBA, for further workup and management of cardiogenic shock, which led to a 6-week hospital stay  (3/29/17-5/12/17), revascularization with  FEMI x7 to RCA (culprit), LCx (, now closed) and LAD on 3/27, with refractory cardiogenic shock requiring intraaortic balloon pump, eventually subclavian balloon pump, multiple episodes of PEDRO, sepsis, sacral ulcer, DVT on a/c, b/l hemispheric infarcts likely due to watershed ischemia,  eventually refractory CS was attributed to ischemic MR on iCM (EF25-30%), and pt was treated with percutaneous MV repair with MitraClip (5/1/17), which led to reduction in MR from severe to mild, and substantial clinic improvement, weaning of IABP, and eventual discharge to rehabilitation, where pt stayed from 5/12/17 -5/26/17. Mr Henao seems to have made excellent progress in rehabilitation, and has now been living at home for the past 2 weeks. He presents to clinic with his wife and an interpretor, and seems to be in excellent spirits. His exercise tolerance is essentially not limited by dyspnea, and he has no signs of volume overload. His echo today showed MitraClip in appropriate position, minimal MR, acceptable inflow gradient, and moderately reduced EF (30-35%) with inferior akinesis.    His diuretics and acei were reportedly adjusted, but the patient does not know the exact dose at this time. His labs today show near complete recovery in renal function, and acceptable electrolytes. A  beta blocker was intentionally withheld on discharge, so we will plan to start one today since pt has no signs of shock or volume overload.    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 He continues to participate in outpatient cardiac rehab two times per week.      CURRENT MEDICATIONS:  Current Outpatient Prescriptions   Medication Sig Dispense Refill             aspirin 81 MG EC tablet Take 1 tablet (81 mg) by mouth daily 90 tablet 3     ASPIRIN NOT PRESCRIBED (INTENTIONAL) Please choose reason not prescribed, below 0 each 0     acetaminophen (TYLENOL) 325 MG tablet Take 2 tablets (650 mg) by mouth every 4 hours as needed for mild pain or fever 100 tablet 0     ascorbic acid 500 MG TABS Take 1 tablet (500 mg) by mouth daily 2 tablet 0     atorvastatin (LIPITOR) 40 MG tablet 1 tablet (40 mg) by Oral or Feeding Tube route daily 60 tablet 0     beta carotene 96762 UNIT capsule Take 1 capsule (25,000 Units) by mouth daily 2 capsule 0     bumetanide (BUMEX) 0.5 MG tablet Take 1 tablet (0.5 mg) by mouth 2 times daily 120 tablet 0     cetirizine (ZYRTEC) 10 MG tablet Take 1 tablet (10 mg) by mouth daily 60 tablet 0     clopidogrel (PLAVIX) 75 MG tablet Take 1 tablet (75 mg) by mouth daily 60 tablet 0     digoxin (LANOXIN) 125 MCG tablet Take 1 tablet (125 mcg) by mouth daily 60 tablet 0     finasteride (PROSCAR) 5 MG tablet Take 1 tablet (5 mg) by mouth daily 60 tablet 0     insulin glargine (LANTUS) 100 UNIT/ML injection Inject 25 Units Subcutaneous every morning (before breakfast) 9 mL 0     lisinopril (PRINIVIL/ZESTRIL) 2.5 MG tablet Take 1 tablet (2.5 mg) by mouth 2 times daily 120 tablet 0     pantoprazole (PROTONIX) 40 MG EC tablet Take 1 tablet (40 mg) by mouth daily 60 tablet 0     potassium chloride SA (K-DUR/KLOR-CON M) 20 MEQ CR tablet Take 1 tablet (20 mEq) by mouth 2 times daily 120 tablet 0     spironolactone (ALDACTONE) 25 MG  tablet Take 0.5 tablets (12.5 mg) by mouth daily 30 tablet 0     tamsulosin (FLOMAX) 0.4 MG capsule Take 1 capsule (0.4 mg) by mouth daily 60 capsule 0     warfarin (COUMADIN) 3 MG tablet Take 1 tablet (3 mg) by mouth daily 30 tablet 0     zinc sulfate (ZINCATE) 220 (50 ZN) MG capsule Take 1 capsule (220 mg) by mouth daily 2 capsule 0     blood glucose monitoring (NO BRAND SPECIFIED) test strip Use to test blood sugar 3 times daily or as directed. 100 strip 6     alcohol swab prep pads Use to swab area of injection/mary alice as directed 3 x per day 100 each 3     Sharps Container MISC 1 each every 30 days 1 each 0     insulin pen needle (BD KYLEE U/F) 32G X 4 MM Use 3 daily or as directed. 100 each prn     Warfarin Therapy Reminder 1 each continuous prn         PAST SURGICAL HISTORY:  Past Surgical History:   Procedure Laterality Date     COLONOSCOPY N/A 4/17/2017    Procedure: COLONOSCOPY;  Surgeon: Rashaad Bundy MD;  Location: [facility]     INSERT INTRAAORTIC BALLOON PUMP Right 4/19/2017    Procedure: INSERT INTRAAORTIC BALLOON PUMP;  Right Subclavian Intra Aortic Balloon Pump Insertion using Maquet 40cc Ballon Catheter, Implentation of 8mm Gelweave Woven Vascular Prosthesis, Removal of Left Femoral Ballon Pump Catheter, Flouroscopy;  Surgeon: Keshav Leung MD;  Location: [facility] OR     PERCUTANEOUS MITRAL VALVE REPAIR N/A 5/1/2017    Procedure: PERCUTANEOUS MITRAL VALVE REPAIR ANESTHESIA;  Mitraclip Procedure Possible Cardiopulmonary Bypass ;  Surgeon: Ron Cortez MD;  Location: [facility] OR     SUBCLAVIAN AORTIC VALVE IMPLANT N/A 5/8/2017    Procedure: SUBCLAVIAN AORTIC VALVE IMPLANT;  Right Subclavian Graft Removal ;  Surgeon: Keshav Leung MD;  Location: [facility] OR       ALLERGIES   No Known Allergies    FAMILY HISTORY:  No family history on file.    SOCIAL HISTORY:  Social History     Social History     Marital status:      Spouse name: N/A     Number of children: N/A     Years of  "education: N/A     Social History Main Topics     Smoking status: Former Smoker     Packs/day: 0.50     Types: Cigarettes     Smokeless tobacco: None     Alcohol use No     Drug use: No     Sexual activity: Not Currently     Partners: Female     Other Topics Concern     None     Social History Narrative       ROS:   Constitutional: No fever, chills, or sweats. No weight gain/loss   ENT: No visual disturbance, ear ache, epistaxis, sore throat  Allergies/Immunologic: Negative.   Respiratory: No cough, hemoptysia  Cardiovascular: As per HPI  GI: No nausea, vomiting, hematemesis, melena, or hematochezia  : No urinary frequency, dysuria, or hematuria  Integument: Negative  Psychiatric: Negative  Neuro: Negative  Endocrinology: Negative   Musculoskeletal: Negative    EXAM:  BP 99/70 (BP Location: Left arm, Patient Position: Chair, Cuff Size: Adult Regular)  Pulse 68  Ht 1.575 m (5' 2\")  Wt 55.6 kg (122 lb 8 oz)  SpO2 98%  BMI 22.41 kg/m2  Comf, NAD  MMM  Eomi  JVP 5cm  CTA B No rales  S1s2 rrr no murmer  No cce warm 1+DP  R subclavian incision from IABP, dressing CDI, packed with iodoform, no pus or fluctuance,  No surrounding erythema      Labs:  LIPID RESULTS:  Lab Results   Component Value Date    CHOL 128 03/27/2017    HDL 36 (L) 03/27/2017    LDL 77 03/27/2017    TRIG 76 03/27/2017    NHDL 92 03/27/2017       LIVER ENZYME RESULTS:  Lab Results   Component Value Date    AST 25 05/08/2017    ALT 30 05/08/2017       CBC RESULTS:  Lab Results   Component Value Date    WBC 9.0 06/08/2017    RBC 4.08 (L) 06/08/2017    HGB 12.0 (L) 06/08/2017    HCT 37.1 (L) 06/08/2017    MCV 91 06/08/2017    MCH 29.4 06/08/2017    MCHC 32.3 06/08/2017    RDW 14.8 06/08/2017     06/08/2017       BMP RESULTS:  Lab Results   Component Value Date     06/08/2017    POTASSIUM 4.5 06/08/2017    CHLORIDE 102 06/08/2017    CO2 30 06/08/2017    ANIONGAP 8 06/08/2017     (H) 06/08/2017    BUN 13 06/08/2017    CR 1.05 " 06/08/2017    GFRESTIMATED 75 06/08/2017    GFRESTBLACK >90   GFR Calc   06/08/2017    ROB 9.1 06/08/2017        A1C RESULTS:  Lab Results   Component Value Date    A1C 5.7 06/06/2017       INR RESULTS:  Lab Results   Component Value Date    INR 1.8 (A) 06/05/2017    INR 3.2 (A) 06/02/2017    INR 2.55 (H) 05/26/2017    INR 2.30 (H) 05/25/2017       Procedures:  ECHO 5/2/17  Interpretation Summary  S/p trans-catheter azly-in-qhdz mitral valve repair (TMVR). There is less than  mild residual mitral regurgitation. The mean gradient is 4.7 mmHg at a heart  rate in excess of 115 bpm.  Dilation of the inferior vena cava is present with normal respiratory  variation in diameter. Estimated mean right atrial pressure is 3-8 mmHg.  No pericardial effusion is present.    ECHO 6/8/17  Interpretation Summary  Moderately (EF 30-35%) reduced left ventricular function is present. Inferior  wall akinesis. Basal and mid inferoseptal and inferolateral hypokinesis.  Global right ventricular function is mildly reduced.  S/p trans-catheter jnuy-qn-cvce mitral valve repair (TMVR). Mean gradient of  3.4 mmHg at a HR of 96 bpm. Trace to mild mitral insufficiency is present.  The inferior vena cava was normal in size with preserved respiratory  variability.  No pericardial effusion is present.  Compared to study from 5/2/17 there is no significant change.    Assessment and Plan: 51M with DM, former smoker, admission to [facility] 3/29/17-5/12/17 for RCA STEMI, new diagnosis 3vCAD treated with PCI x 7, new iCM (EF 30-35%), and ischemic MR c/b refractory cardiogenic shock with multisystem organ dysfunction, s/p MitraClip 5/1/17 which led to reduction in MR from severe to mild, and substantial clinic improvement, weaning of IABP, and eventual discharge to rehabilitation. Patient made significant progress at rehabilitation and has now been living at home for 2 weeks. His echo today is unchanged showing device in good position, mild MR,  acceptable inflow gradient, and moderately reduced EF (30-35%) with inferior akinesis. Patient is doing very well clinically, exercise tolerance is essentially unlimited, renal function has normalized, and no signs of volume overload.    # iCM (EF 30-35%), NYHA I-II, s/p PCI x7 to RCA (culprit), LCX (, stents later found to be occluded), LAD, s/p MItraClip  - cont lisinopril at current dose  - cont spironolactone at current dose  - begin toprol xl 12.5, given no signs of shock or fluid overload  - cont bumex 0.5 bid, euvolemic, and electrolytes acceptable  - cont digoxin  - cont high intensity statin  - on coumadin for DVT for 3-6 months and plavix for at least one year (likely longer)  - once coumadin is discontinued, should be resumed on asa 81 lifelong  - DM management per PCP  - cont cardiac rehab  - RTC in 1-2 months, continue GDMT as above, will repeat echo at that time and refer for ICD for primary prevention of SCD if EF <35%    Discussed plan in detail with patient and with Dr Christianson.    Gallo Sheffield  Cardiology Fellow  [phone]    Iva Christianson MD      CC  Patient Care Team:  No Ref-Primary, Physician as PCP - Luz Montes De Oca as Nurse Coordinator (Cardiology)  Keenan Tierney EP as Cardiac Rehabilitation Therapist (Cardiac Rehab)  IVA CHRISTIANSON       Doxepin Pregnancy And Lactation Text: This medication is Pregnancy Category C and it isn't known if it is safe during pregnancy. It is also excreted in breast milk and breast feeding isn't recommended.

## 2020-03-25 DIAGNOSIS — E11.9 TYPE 2 DIABETES MELLITUS WITHOUT COMPLICATION, WITHOUT LONG-TERM CURRENT USE OF INSULIN (H): ICD-10-CM

## 2020-03-25 RX ORDER — METFORMIN HCL 500 MG
1000 TABLET, EXTENDED RELEASE 24 HR ORAL
Qty: 180 TABLET | Refills: 3 | Status: SHIPPED | OUTPATIENT
Start: 2020-03-25 | End: 2021-03-11

## 2020-03-25 NOTE — TELEPHONE ENCOUNTER
"Requested Prescriptions   Pending Prescriptions Disp Refills     metFORMIN (GLUCOPHAGE-XR) 500 MG 24 hr tablet [Pharmacy Med Name: METFORMIN HCL  MG TABLET] 180 tablet 3     Sig: TAKE 2 TABLETS (1,000 MG) BY MOUTH DAILY (WITH DINNER)       Biguanide Agents Failed - 3/25/2020  9:41 AM        Failed - Patient has documented LDL within the past 12 mos.     Recent Labs   Lab Test 10/22/18  0909   LDL 50             Failed - Patient has had a Microalbumin in the past 15 mos.     No lab results found.          Failed - Patient has documented A1c within the specified period of time.     If HgbA1C is 8 or greater, it needs to be on file within the past 3 months.  If less than 8, must be on file within the past 6 months.     Recent Labs   Lab Test 04/02/19  1113   A1C 6.8*             Passed - Blood pressure less than 140/90 in past 6 months     BP Readings from Last 3 Encounters:   01/07/20 138/89   12/06/19 115/78   10/16/19 (!) 155/110                 Passed - Patient is age 10 or older        Passed - Patient's CR is NOT>1.4 OR Patient's EGFR is NOT<45 within past 12 mos.     Recent Labs   Lab Test 01/07/20  0910   GFRESTIMATED 82   GFRESTBLACK >90       Recent Labs   Lab Test 01/07/20  0910   CR 1.04             Passed - Patient does NOT have a diagnosis of CHF.        Passed - Medication is active on med list        Passed - Recent (6 mo) or future (30 days) visit within the authorizing provider's specialty     Patient had office visit in the last 6 months or has a visit in the next 30 days with authorizing provider or within the authorizing provider's specialty.  See \"Patient Info\" tab in inbasket, or \"Choose Columns\" in Meds & Orders section of the refill encounter.               Routing refill request to provider for review/approval because:  Labs not current  Overdue for office visit        "

## 2020-03-31 DIAGNOSIS — E11.9 TYPE 2 DIABETES MELLITUS WITHOUT COMPLICATION, WITHOUT LONG-TERM CURRENT USE OF INSULIN (H): ICD-10-CM

## 2020-03-31 RX ORDER — GLIPIZIDE 5 MG/1
TABLET ORAL
Qty: 180 TABLET | Refills: 0 | Status: SHIPPED | OUTPATIENT
Start: 2020-03-31 | End: 2021-01-19

## 2020-03-31 NOTE — TELEPHONE ENCOUNTER
Requested Prescriptions   Pending Prescriptions Disp Refills     glipiZIDE (GLUCOTROL) 5 MG tablet 180 tablet 0       There is no refill protocol information for this order      Last Written Prescription Date:  12/10/19  Last Fill Quantity: 180,  # refills: 0   Last office visit: 6/25/2019 with prescribing provider:  06/25/19   Future Office Visit:  0

## 2020-04-07 ENCOUNTER — ANCILLARY PROCEDURE (OUTPATIENT)
Dept: CARDIOLOGY | Facility: CLINIC | Age: 55
End: 2020-04-07
Attending: INTERNAL MEDICINE
Payer: COMMERCIAL

## 2020-04-07 DIAGNOSIS — I42.9 CARDIOMYOPATHY (H): ICD-10-CM

## 2020-04-07 PROCEDURE — 93295 DEV INTERROG REMOTE 1/2/MLT: CPT | Performed by: INTERNAL MEDICINE

## 2020-04-07 PROCEDURE — 93296 REM INTERROG EVL PM/IDS: CPT | Mod: ZF

## 2020-04-13 LAB
MDC_IDC_LEAD_IMPLANT_DT: NORMAL
MDC_IDC_LEAD_LOCATION: NORMAL
MDC_IDC_LEAD_LOCATION_DETAIL_1: NORMAL
MDC_IDC_LEAD_MFG: NORMAL
MDC_IDC_LEAD_MODEL: NORMAL
MDC_IDC_LEAD_POLARITY_TYPE: NORMAL
MDC_IDC_LEAD_SERIAL: NORMAL
MDC_IDC_LEAD_SPECIAL_FUNCTION: NORMAL
MDC_IDC_MSMT_BATTERY_DTM: NORMAL
MDC_IDC_MSMT_BATTERY_REMAINING_LONGEVITY: 123 MO
MDC_IDC_MSMT_BATTERY_RRT_TRIGGER: 2.73
MDC_IDC_MSMT_BATTERY_STATUS: NORMAL
MDC_IDC_MSMT_BATTERY_VOLTAGE: 3.02 V
MDC_IDC_MSMT_CAP_CHARGE_DTM: NORMAL
MDC_IDC_MSMT_CAP_CHARGE_ENERGY: 18 J
MDC_IDC_MSMT_CAP_CHARGE_TIME: 3.61
MDC_IDC_MSMT_CAP_CHARGE_TYPE: NORMAL
MDC_IDC_MSMT_LEADCHNL_RV_IMPEDANCE_VALUE: 304 OHM
MDC_IDC_MSMT_LEADCHNL_RV_IMPEDANCE_VALUE: 342 OHM
MDC_IDC_MSMT_LEADCHNL_RV_PACING_THRESHOLD_AMPLITUDE: 0.5 V
MDC_IDC_MSMT_LEADCHNL_RV_PACING_THRESHOLD_PULSEWIDTH: 0.4 MS
MDC_IDC_MSMT_LEADCHNL_RV_SENSING_INTR_AMPL: 9.62 MV
MDC_IDC_MSMT_LEADCHNL_RV_SENSING_INTR_AMPL: 9.62 MV
MDC_IDC_PG_IMPLANT_DTM: NORMAL
MDC_IDC_PG_MFG: NORMAL
MDC_IDC_PG_MODEL: NORMAL
MDC_IDC_PG_SERIAL: NORMAL
MDC_IDC_PG_TYPE: NORMAL
MDC_IDC_SESS_CLINIC_NAME: NORMAL
MDC_IDC_SESS_DTM: NORMAL
MDC_IDC_SESS_TYPE: NORMAL
MDC_IDC_SET_BRADY_HYSTRATE: NORMAL
MDC_IDC_SET_BRADY_LOWRATE: 40 {BEATS}/MIN
MDC_IDC_SET_BRADY_MODE: NORMAL
MDC_IDC_SET_LEADCHNL_RV_PACING_AMPLITUDE: 2 V
MDC_IDC_SET_LEADCHNL_RV_PACING_ANODE_ELECTRODE_1: NORMAL
MDC_IDC_SET_LEADCHNL_RV_PACING_ANODE_LOCATION_1: NORMAL
MDC_IDC_SET_LEADCHNL_RV_PACING_CAPTURE_MODE: NORMAL
MDC_IDC_SET_LEADCHNL_RV_PACING_CATHODE_ELECTRODE_1: NORMAL
MDC_IDC_SET_LEADCHNL_RV_PACING_CATHODE_LOCATION_1: NORMAL
MDC_IDC_SET_LEADCHNL_RV_PACING_POLARITY: NORMAL
MDC_IDC_SET_LEADCHNL_RV_PACING_PULSEWIDTH: 0.4 MS
MDC_IDC_SET_LEADCHNL_RV_SENSING_ANODE_ELECTRODE_1: NORMAL
MDC_IDC_SET_LEADCHNL_RV_SENSING_ANODE_LOCATION_1: NORMAL
MDC_IDC_SET_LEADCHNL_RV_SENSING_CATHODE_ELECTRODE_1: NORMAL
MDC_IDC_SET_LEADCHNL_RV_SENSING_CATHODE_LOCATION_1: NORMAL
MDC_IDC_SET_LEADCHNL_RV_SENSING_POLARITY: NORMAL
MDC_IDC_SET_LEADCHNL_RV_SENSING_SENSITIVITY: 0.3 MV
MDC_IDC_SET_ZONE_DETECTION_BEATS_DENOMINATOR: 40 {BEATS}
MDC_IDC_SET_ZONE_DETECTION_BEATS_NUMERATOR: 30 {BEATS}
MDC_IDC_SET_ZONE_DETECTION_INTERVAL: 300 MS
MDC_IDC_SET_ZONE_DETECTION_INTERVAL: 360 MS
MDC_IDC_SET_ZONE_DETECTION_INTERVAL: 390 MS
MDC_IDC_SET_ZONE_DETECTION_INTERVAL: NORMAL
MDC_IDC_SET_ZONE_TYPE: NORMAL
MDC_IDC_STAT_AT_BURDEN_PERCENT: 0 %
MDC_IDC_STAT_AT_DTM_END: NORMAL
MDC_IDC_STAT_AT_DTM_START: NORMAL
MDC_IDC_STAT_BRADY_DTM_END: NORMAL
MDC_IDC_STAT_BRADY_DTM_START: NORMAL
MDC_IDC_STAT_BRADY_RV_PERCENT_PACED: 0.01 %
MDC_IDC_STAT_EPISODE_RECENT_COUNT: 0
MDC_IDC_STAT_EPISODE_RECENT_COUNT_DTM_END: NORMAL
MDC_IDC_STAT_EPISODE_RECENT_COUNT_DTM_START: NORMAL
MDC_IDC_STAT_EPISODE_TOTAL_COUNT: 0
MDC_IDC_STAT_EPISODE_TOTAL_COUNT_DTM_END: NORMAL
MDC_IDC_STAT_EPISODE_TOTAL_COUNT_DTM_START: NORMAL
MDC_IDC_STAT_EPISODE_TYPE: NORMAL
MDC_IDC_STAT_TACHYTHERAPY_ATP_DELIVERED_RECENT: 0
MDC_IDC_STAT_TACHYTHERAPY_ATP_DELIVERED_TOTAL: 0
MDC_IDC_STAT_TACHYTHERAPY_RECENT_DTM_END: NORMAL
MDC_IDC_STAT_TACHYTHERAPY_RECENT_DTM_START: NORMAL
MDC_IDC_STAT_TACHYTHERAPY_SHOCKS_ABORTED_RECENT: 0
MDC_IDC_STAT_TACHYTHERAPY_SHOCKS_ABORTED_TOTAL: 0
MDC_IDC_STAT_TACHYTHERAPY_SHOCKS_DELIVERED_RECENT: 0
MDC_IDC_STAT_TACHYTHERAPY_SHOCKS_DELIVERED_TOTAL: 0
MDC_IDC_STAT_TACHYTHERAPY_TOTAL_DTM_END: NORMAL
MDC_IDC_STAT_TACHYTHERAPY_TOTAL_DTM_START: NORMAL

## 2020-04-15 NOTE — NURSING NOTE
Chief Complaint   Patient presents with     Follow Up For     2 week return CORE, labs prior     Vitals were taken and medications were reconciled.   Karon Ragland MA    9:14 AM         Berna BALDWIN

## 2020-04-20 ENCOUNTER — TELEPHONE (OUTPATIENT)
Dept: CARDIOLOGY | Facility: CLINIC | Age: 55
End: 2020-04-20

## 2020-04-20 DIAGNOSIS — I25.10 CORONARY ARTERY DISEASE INVOLVING NATIVE CORONARY ARTERY OF NATIVE HEART WITHOUT ANGINA PECTORIS: ICD-10-CM

## 2020-04-20 NOTE — TELEPHONE ENCOUNTER
[facility] Call Center    Phone Message    May a detailed message be left on voicemail: yes     Reason for Call: Medication Refill Request    Has the patient contacted the pharmacy for the refill? Yes   Name of medication being requested: [facility]/PHARMACY #[id number] - [place], [place] - [address]   Provider who prescribed the medication: Chuck Harris   Pharmacy: [place], [place]   Date medication is needed: as soon as possible          Action Taken: Message routed to:  [facility] hal cardio     Travel Screening: Not Applicable

## 2020-04-22 RX ORDER — ASPIRIN 81 MG/1
81 TABLET, CHEWABLE ORAL DAILY
Qty: 90 TABLET | Refills: 3 | Status: SHIPPED | OUTPATIENT
Start: 2020-04-22 | End: 2021-05-04

## 2020-05-25 DIAGNOSIS — R09.81 NASAL CONGESTION: ICD-10-CM

## 2020-05-25 RX ORDER — FLUTICASONE PROPIONATE 50 MCG
2 SPRAY, SUSPENSION (ML) NASAL DAILY
Qty: 48 ML | Refills: 3 | Status: SHIPPED | OUTPATIENT
Start: 2020-05-25 | End: 2021-10-08

## 2020-07-30 DIAGNOSIS — I25.5 ISCHEMIC CARDIOMYOPATHY: ICD-10-CM

## 2020-07-30 NOTE — TELEPHONE ENCOUNTER
[facility] Call Center    Phone Message    May a detailed message be left on voicemail: yes     Reason for Call: Medication Refill Request    Has the patient contacted the pharmacy for the refill? Yes   Name of medication being requested: bumetanide (BUMEX) 0.5 MG tablet   Provider who prescribed the medication: Dr. Amaral  Pharmacy: [facility]/PH[facility]ACY #[id number] [place] [id number] [facility]   Date medication is needed: 7/30/20         Action Taken: Message routed to:  [facility] REHAN Cardio    Travel Screening: Not Applicable

## 2020-07-30 NOTE — TELEPHONE ENCOUNTER
bumetanide (BUMEX) 0.5 MG tablet       Last Written Prescription Date:  10-  Last Fill Quantity: 45,   # refills: 11  Last Office Visit : 1-7-2020  Future Office visit:  none    Routing refill request to provider for review/approval because:  Patient medication list indicates patient is to be taking 1/2 tab daily and 2 tabs only when weight is up.  He has been filling a full 45 tabs every 20-21 days (per pharmacy) since October 2019. Correct use? Last clinic note plan does not address Bumex.    Kathleen M Doege RN

## 2020-07-31 ENCOUNTER — PATIENT OUTREACH (OUTPATIENT)
Dept: CARDIOLOGY | Facility: CLINIC | Age: 55
End: 2020-07-31

## 2020-07-31 RX ORDER — BUMETANIDE 0.5 MG/1
0.5 TABLET ORAL DAILY
Qty: 30 TABLET | Refills: 0 | Status: SHIPPED | OUTPATIENT
Start: 2020-07-31 | End: 2020-08-31

## 2020-07-31 NOTE — TELEPHONE ENCOUNTER
Called Luke to schedule follow up. Has refill request in for Bumex. Has cancelled last couple cardiology appts. Left voicemail asking him to call to reschedule.   Soraya Messina RN

## 2020-07-31 NOTE — TELEPHONE ENCOUNTER
Reviewed with Margarita MOCTEZUMA. Called Bong and left voicemail via  to schedule follow up. Ok to fill 30 days and needs follow up in clinic.

## 2020-08-03 ENCOUNTER — APPOINTMENT (OUTPATIENT)
Dept: INTERPRETER SERVICES | Facility: CLINIC | Age: 55
End: 2020-08-03
Payer: COMMERCIAL

## 2020-08-03 ENCOUNTER — PATIENT OUTREACH (OUTPATIENT)
Dept: CARDIOLOGY | Facility: CLINIC | Age: 55
End: 2020-08-03

## 2020-08-03 NOTE — TELEPHONE ENCOUNTER
Called Luke to schedule follow up in cardiology clinic. Called with [place] , no answer on home or cell.   Will continue to try to get hold of.

## 2020-08-06 ENCOUNTER — PATIENT OUTREACH (OUTPATIENT)
Dept: CARDIOLOGY | Facility: CLINIC | Age: 55
End: 2020-08-06

## 2020-08-06 NOTE — TELEPHONE ENCOUNTER
Using  I called Luke to schedule follow up in clinic. Left voicemail via  that we refilled his Bumex for 30 days but that he needs to make a follow up appointment to be seen in clinic.   Asked for call back.   Soraya Messina RN

## 2020-08-18 ENCOUNTER — APPOINTMENT (OUTPATIENT)
Dept: INTERPRETER SERVICES | Facility: CLINIC | Age: 55
End: 2020-08-18
Payer: COMMERCIAL

## 2020-08-18 ENCOUNTER — PATIENT OUTREACH (OUTPATIENT)
Dept: CARDIOLOGY | Facility: CLINIC | Age: 55
End: 2020-08-18

## 2020-08-18 NOTE — TELEPHONE ENCOUNTER
Attempted to call Luke again to schedule follow up in cardiology clinic. No answer. Voicemail left.

## 2020-08-27 DIAGNOSIS — I25.5 ISCHEMIC CARDIOMYOPATHY: ICD-10-CM

## 2020-08-31 RX ORDER — BUMETANIDE 0.5 MG/1
0.5 TABLET ORAL DAILY
Qty: 90 TABLET | Refills: 0 | Status: SHIPPED | OUTPATIENT
Start: 2020-08-31 | End: 2020-11-09

## 2020-09-16 NOTE — MR AVS SNAPSHOT
Javiertrish DEBBI Henao   11/13/2017 1:30 PM   Anticoagulation Therapy Visit    Description:  52 year old male   Provider:  [facility] [facility];  ANTICOAGULATION CLINIC   Department:   Anti Coagulation           INR as of 11/13/2017     Today's INR 3.8!      Anticoagulation Summary as of 11/13/2017     INR goal 2.0-3.0   Today's INR 3.8!   Full instructions 11/13: Hold; 11/15: 1.5 mg; 11/17: 1.5 mg; Otherwise 1.5 mg on Mon, Wed, Fri; 3 mg all other days   Next INR check 11/20/2017    Indications   Cerebrovascular accident (CVA) due to thrombosis of cerebral artery (H) (Resolved) [I63.30]  DVT (deep venous thrombosis) (H) [I82.409]  Long-term (current) use of anticoagulants [Z79.01] [Z79.01]         Your next Anticoagulation Clinic appointment(s)     Nov 20, 2017 10:00 AM CST   Anticoagulation Visit with  [facility]    [address] [place] [id number] [phone]              Contact Numbers     [facility]  Please call  [phone] to cancel and/or reschedule your appointment   Please call  [phone] with any problems or questions regarding your therapy.        November 2017 Details    Sun Mon Tue Wed Thu Fri Sat        1               2               3               4                 5               6               7               8               9               10               11                 12               13      Hold   See details      14      3 mg         15      1.5 mg         16      3 mg         17      1.5 mg         18      3 mg           19      3 mg         20            21               22               23               24               25                 26               27               28               29               30                  Date Details   11/13 This INR check       Date of next INR:  11/20/2017         How to take your warfarin dose     To take:  1.5 mg Take  More turnaround time is typically needed. Could clinic staff create the 1 page letter for my review and signature. I am not in the office this afternoon working remotely. I am in the office tomorrow. Either call patient or his wife tonight with the status of this or call them tomorrow morning. 0.5 of a 3 mg tablet.    To take:  3 mg Take 1 of the 3 mg tablets.    Hold Do not take your warfarin dose. See the Details table to the right for additional instructions.

## 2020-10-10 ENCOUNTER — ANCILLARY PROCEDURE (OUTPATIENT)
Dept: CARDIOLOGY | Facility: CLINIC | Age: 55
End: 2020-10-10
Attending: INTERNAL MEDICINE
Payer: COMMERCIAL

## 2020-10-10 DIAGNOSIS — I42.9 CARDIOMYOPATHY (H): ICD-10-CM

## 2020-10-10 PROCEDURE — 93296 REM INTERROG EVL PM/IDS: CPT

## 2020-10-10 PROCEDURE — 93295 DEV INTERROG REMOTE 1/2/MLT: CPT | Performed by: INTERNAL MEDICINE

## 2020-10-12 ENCOUNTER — ANCILLARY PROCEDURE (OUTPATIENT)
Dept: GENERAL RADIOLOGY | Facility: CLINIC | Age: 55
End: 2020-10-12
Attending: INTERNAL MEDICINE
Payer: COMMERCIAL

## 2020-10-12 ENCOUNTER — OFFICE VISIT (OUTPATIENT)
Dept: INTERNAL MEDICINE | Facility: CLINIC | Age: 55
End: 2020-10-12
Payer: COMMERCIAL

## 2020-10-12 VITALS
WEIGHT: 145.6 LBS | SYSTOLIC BLOOD PRESSURE: 132 MMHG | HEIGHT: 62 IN | BODY MASS INDEX: 26.79 KG/M2 | RESPIRATION RATE: 16 BRPM | DIASTOLIC BLOOD PRESSURE: 88 MMHG | HEART RATE: 74 BPM | OXYGEN SATURATION: 99 %

## 2020-10-12 DIAGNOSIS — Z12.5 SCREENING FOR PROSTATE CANCER: ICD-10-CM

## 2020-10-12 DIAGNOSIS — E11.9 TYPE 2 DIABETES MELLITUS WITHOUT COMPLICATION, WITHOUT LONG-TERM CURRENT USE OF INSULIN (H): ICD-10-CM

## 2020-10-12 DIAGNOSIS — R22.1 MASS OF NECK: ICD-10-CM

## 2020-10-12 DIAGNOSIS — Z23 NEED FOR PROPHYLACTIC VACCINATION AND INOCULATION AGAINST INFLUENZA: ICD-10-CM

## 2020-10-12 DIAGNOSIS — Z00.01 ENCOUNTER FOR ROUTINE ADULT MEDICAL EXAM WITH ABNORMAL FINDINGS: Primary | ICD-10-CM

## 2020-10-12 PROBLEM — I82.409 DVT (DEEP VENOUS THROMBOSIS) (H): Status: RESOLVED | Noted: 2017-01-01 | Resolved: 2020-10-12

## 2020-10-12 PROBLEM — Z86.73 HISTORY OF STROKE: Status: ACTIVE | Noted: 2017-01-01

## 2020-10-12 PROBLEM — Z86.718 HISTORY OF DEEP VENOUS THROMBOSIS: Status: ACTIVE | Noted: 2017-01-01

## 2020-10-12 LAB
ALBUMIN SERPL-MCNC: 3.8 G/DL (ref 3.4–5)
ALP SERPL-CCNC: 70 U/L (ref 40–150)
ALT SERPL W P-5'-P-CCNC: 27 U/L (ref 0–70)
ANION GAP SERPL CALCULATED.3IONS-SCNC: 6 MMOL/L (ref 3–14)
AST SERPL W P-5'-P-CCNC: 19 U/L (ref 0–45)
BILIRUB SERPL-MCNC: 1.2 MG/DL (ref 0.2–1.3)
BUN SERPL-MCNC: 26 MG/DL (ref 7–30)
CALCIUM SERPL-MCNC: 9.1 MG/DL (ref 8.5–10.1)
CHLORIDE SERPL-SCNC: 103 MMOL/L (ref 94–109)
CHOLEST SERPL-MCNC: 136 MG/DL
CO2 SERPL-SCNC: 29 MMOL/L (ref 20–32)
CREAT SERPL-MCNC: 1.1 MG/DL (ref 0.66–1.25)
ERYTHROCYTE [DISTWIDTH] IN BLOOD BY AUTOMATED COUNT: 13.5 % (ref 10–15)
GFR SERPL CREATININE-BSD FRML MDRD: 76 ML/MIN/{1.73_M2}
GLUCOSE SERPL-MCNC: 153 MG/DL (ref 70–99)
HBA1C MFR BLD: 6.4 % (ref 0–5.6)
HCT VFR BLD AUTO: 46.3 % (ref 40–53)
HDLC SERPL-MCNC: 43 MG/DL
HGB BLD-MCNC: 15.2 G/DL (ref 13.3–17.7)
LDLC SERPL CALC-MCNC: 73 MG/DL
MCH RBC QN AUTO: 30.6 PG (ref 26.5–33)
MCHC RBC AUTO-ENTMCNC: 32.8 G/DL (ref 31.5–36.5)
MCV RBC AUTO: 93 FL (ref 78–100)
NONHDLC SERPL-MCNC: 93 MG/DL
PLATELET # BLD AUTO: 224 10E9/L (ref 150–450)
POTASSIUM SERPL-SCNC: 5.2 MMOL/L (ref 3.4–5.3)
PROT SERPL-MCNC: 7.9 G/DL (ref 6.8–8.8)
PSA SERPL-ACNC: 0.89 UG/L (ref 0–4)
RBC # BLD AUTO: 4.96 10E12/L (ref 4.4–5.9)
SODIUM SERPL-SCNC: 138 MMOL/L (ref 133–144)
TRIGL SERPL-MCNC: 98 MG/DL
WBC # BLD AUTO: 8.4 10E9/L (ref 4–11)

## 2020-10-12 PROCEDURE — 83036 HEMOGLOBIN GLYCOSYLATED A1C: CPT | Performed by: INTERNAL MEDICINE

## 2020-10-12 PROCEDURE — 99396 PREV VISIT EST AGE 40-64: CPT | Mod: 25 | Performed by: INTERNAL MEDICINE

## 2020-10-12 PROCEDURE — G0103 PSA SCREENING: HCPCS | Performed by: INTERNAL MEDICINE

## 2020-10-12 PROCEDURE — 85027 COMPLETE CBC AUTOMATED: CPT | Performed by: INTERNAL MEDICINE

## 2020-10-12 PROCEDURE — 99213 OFFICE O/P EST LOW 20 MIN: CPT | Mod: 25 | Performed by: INTERNAL MEDICINE

## 2020-10-12 PROCEDURE — 90682 RIV4 VACC RECOMBINANT DNA IM: CPT | Performed by: INTERNAL MEDICINE

## 2020-10-12 PROCEDURE — 99207 PR FOOT EXAM NO CHARGE: CPT | Mod: 25 | Performed by: INTERNAL MEDICINE

## 2020-10-12 PROCEDURE — 71046 X-RAY EXAM CHEST 2 VIEWS: CPT | Performed by: RADIOLOGY

## 2020-10-12 PROCEDURE — 90471 IMMUNIZATION ADMIN: CPT | Performed by: INTERNAL MEDICINE

## 2020-10-12 PROCEDURE — 80053 COMPREHEN METABOLIC PANEL: CPT | Performed by: INTERNAL MEDICINE

## 2020-10-12 PROCEDURE — 80061 LIPID PANEL: CPT | Performed by: INTERNAL MEDICINE

## 2020-10-12 ASSESSMENT — MIFFLIN-ST. JEOR: SCORE: 1376.75

## 2020-10-12 NOTE — LETTER
10/13/20      Luke Henao  [address]  [facility] [address]        Dear ,    It was a pleasure seeing you again the other day! I hope this finds you well.    I wanted to let you know that your labs and chest xray came back as normal.    I will touch base with you again after your CT scan.    Let's plan on diabetes follow-up in 6 months please. Continue all current medications as prescribed.     Please feel free to contact me with any questions or concerns.      Take care,    Shanna Church MD   [address] [address]  T: [phone], F: [phone]

## 2020-10-12 NOTE — PROGRESS NOTES
SUBJECTIVE                                                      HPI: Luke Henao is a very pleasant 55 year old male who presents for a physical.    Mild language barrier. Patient declines .     Patient reports neck tightness, posteriorly. First noticed several months ago. Asymptomatic - no associated pain or discomfort.     ROS:  Constitutional: no unintentional weight loss or gain reported; no fevers, chills, or sweats  reported    Cardiovascular: see PMH below  Respiratory: no cough, wheezing, shortness of breath, or dyspnea on exertion reported  Gastrointestinal: no nausea, vomiting, constipation, diarrhea, or abdominal pain reported  Genitourinary: no urinary frequency, urgency, dysuria, or hematuria reported  Integumentary: no rash or pruritus reported  Musculoskeletal: no back pain, muscle pain, joint pain, or joint swelling reported  Neurologic: see PMH below  Hematologic: no easy bruising or bleeding reported  Endocrine: see PMH below  Psychiatric: no anxiety or depression reported    Past Medical History:   Diagnosis Date     Benign essential hypertension      CAD (coronary artery disease) 2017    RCA STEMI s/p balloon angioplasty and multiple Sofie to RCA, LCx, and LAD     History of deep venous thrombosis 2017    left internal jugular (line-associated); left common femoral; was on coumadin     History of stroke 2017    no residual deficits     Ischemic cardiomyopathy 2017    EF 30-35%     Type 2 diabetes mellitus (H)      Past Surgical History:   Procedure Laterality Date     C INSERT ELECTRD LEADS/REPOSTION  10/27/2017          COLONOSCOPY N/A 4/17/2017    Procedure: COLONOSCOPY;  Surgeon: Rashaad Bundy MD;  Location: [facility]     INSERT INTRAAORTIC BALLOON PUMP Right 4/19/2017    Procedure: INSERT INTRAAORTIC BALLOON PUMP;  Right Subclavian Intra Aortic Balloon Pump Insertion using Maquet 40cc Ballon Catheter, Implentation of 8mm Gelweave Woven Vascular Prosthesis, Removal of Left  Femoral Ballon Pump Catheter, Flouroscopy;  Surgeon: Keshav Leung MD;  Location: [facility]     PERCUTANEOUS MITRAL VALVE REPAIR N/A 5/1/2017    Procedure: PERCUTANEOUS MITRAL VALVE REPAIR ANESTHESIA;  Mitraclip Procedure Possible Cardiopulmonary Bypass ;  Surgeon: Rno Cortez MD;  Location: [facility]     SUBCLAVIAN AORTIC VALVE IMPLANT N/A 5/8/2017    Procedure: SUBCLAVIAN AORTIC VALVE IMPLANT;  Right Subclavian Graft Removal ;  Surgeon: Keshav Leung MD;  Location: [facility]     Family History   Problem Relation Age of Onset     Hypertension Mother      Diabetes Type 2  Father      Hypertension Father      Myocardial Infarction No family hx of      Cerebrovascular Disease No family hx of      Coronary Artery Disease Early Onset No family hx of      Colon Cancer No family hx of      Prostate Cancer No family hx of      Social History     Socioeconomic History     Marital status:      Spouse name: Not on file     Number of children: Not on file     Years of education: Not on file     Highest education level: Not on file   Occupational History     Occupation: [employer]   Social Needs     Financial resource strain: Not on file     Food insecurity     Worry: Not on file     Inability: Not on file     Transportation needs     Medical: Not on file     Non-medical: Not on file   Tobacco Use     Smoking status: Former Smoker     Packs/day: 0.50     Years: 30.00     Pack years: 15.00     Types: Cigarettes     Quit date: 1/1/2017     Years since quitting: 3.7     Smokeless tobacco: Never Used   Substance and Sexual Activity     Alcohol use: No     Drug use: No     Sexual activity: Not Currently   Lifestyle     Physical activity     Days per week: Not on file     Minutes per session: Not on file     Stress: Not on file   Relationships     Social connections     Talks on phone: Not on file     Gets together: Not on file     Attends Hinduism service: Not on file     Active member of club  or organization: Not on file     Attends meetings of clubs or organizations: Not on file     Relationship status: Not on file     Intimate partner violence     Fear of current or ex partner: Not on file     Emotionally abused: Not on file     Physically abused: Not on file     Forced sexual activity: Not on file   Other Topics Concern     Parent/sibling w/ CABG, MI or angioplasty before 65F 55M? Not Asked   Social History Narrative    . Remarried.    4 children with first marriage.    2 grand children.    No formal exercise.      No Known Allergies     Current Outpatient Medications   Medication Sig     aspirin ([employer] ASPIRIN ADULT LOW DOSE) 81 MG chewable tablet Take 1 tablet (81 mg) by mouth daily     atorvastatin (LIPITOR) 40 MG tablet Take 1 tablet (40 mg) by mouth daily     bumetanide (BUMEX) 0.5 MG tablet Take 1 tablet (0.5 mg) by mouth daily     cetirizine (ZYRTEC) 10 MG tablet TAKE 1 TABLET (10 MG) BY MOUTH DAILY     co-enzyme Q-10 100 MG CAPS capsule Take by mouth daily     fluticasone (FLONASE) 50 MCG/ACT nasal spray SPRAY 2 SPRAYS INTO BOTH NOSTRILS DAILY     glipiZIDE (GLUCOTROL) 5 MG tablet TAKE 1 TABLET (5 MG) BY MOUTH 2 TIMES DAILY (BEFORE MEALS)     guaiFENesin-codeine (ROBITUSSIN AC) 100-10 MG/5ML solution Take 5-10 mLs by mouth every 4 hours as needed     metFORMIN (GLUCOPHAGE-XR) 500 MG 24 hr tablet TAKE 2 TABLETS (1,000 MG) BY MOUTH DAILY (WITH DINNER)     metoprolol succinate ER (TOPROL-XL) 200 MG 24 hr tablet Take 1 tablet (200 mg) by mouth daily     ONETOUCH ULTRA test strip USE TO TEST BLOOD SUGAR 3 TIMES DAILY OR AS DIRECTED.     order for DME Equipment being ordered: wrist-thumb immobilizing splint.     sacubitril-valsartan (ENTRESTO) 49-51 MG per tablet Take 1 tablet by mouth 2 times daily     Immunization History   Administered Date(s) Administered     Influenza Vaccine, 6+MO IM (QUADRIVALENT W/PRESERVATIVES) 10/01/2018     Pneumococcal 23 valent 05/14/2017     TDAP Vaccine (Adacel)  "09/26/2017     OBJECTIVE                                                      /88   Pulse 74   Resp 16   Ht 1.562 m (5' 1.5\")   Wt 66 kg (145 lb 9.6 oz)   SpO2 99%   BMI 27.07 kg/m    Constitutional: well-appearing  Eyes: normal conjunctivae and lids  Head, Ears, and Eyes: normocephalic; normal external auditory canal and pinna; tympanic membranes visualized and normal; normal lids and conjunctivae  Neck: large, firm mass posterior neck; no overlying skin changes; non-tender to palpation  Respiratory: normal respiratory effort; clear to auscultation bilaterally  Cardiovascular: regular rate and rhythm; no edema  Gastrointestinal: soft, non-tender, and non-distended; no organomegaly or masses  Musculoskeletal: normal gait and station  Foot exam: feet intact - no lesions or ulcers; sensation significantly diminished to monofilament  Psych: normal judgment and insight; normal mood and affect; recent and remote memory intact    PREVENTATIVE HEALTH                                                      BMI: overweight  Prostate CA Screening: DUE  Colon CA screening: up to date   Lung CA screening: patient does not meet screening criteria  AAA screening: not medically indicated at this time   Screening HCV: n/a   Screening HIV: completed  STD testing: no risk factors present  Alcohol misuse screening: alcohol use reviewed - no intervention indicated at this time  Immunizations: reviewed; flu shot DUE; Shingrix series DUE - patient declines    ASSESSMENT/PLAN                                                       (Z00.01) Encounter for routine adult medical exam with abnormal findings  (primary encounter diagnosis)  Comment: PMH, PSH, FH, SH, medications, allergies, immunizations, and preventative health measures reviewed and updated as appropriate.  Plan: see below for plans.      (E11.9) Type 2 diabetes mellitus without complication, without long-term current use of insulin (H)  Plan: fasting labs today; " recommendations to follow.    (Z23) Need for prophylactic vaccination and inoculation against influenza  Plan: flu shot given today.     (R22.1) Mass of neck  Comment: large, firm mass posterior neck - firmer than expected for a lipoma; concerning for malignancy.   Plan: CT Soft Tissue Neck w Contrast ordered - patient to schedule; CXR today in light of smoking history.     (Z12.5) Screening for prostate cancer  Plan: PSA screen with above labs.     Shanna Church MD   [place] [address] [address] [address]  T: [phone], F: [phone]    (Note was completed, in part, with [employer] voice-recognition software. Documentation was reviewed, but some grammatical, spelling, and word errors may remain.)

## 2020-10-14 LAB
MDC_IDC_LEAD_IMPLANT_DT: NORMAL
MDC_IDC_LEAD_LOCATION: NORMAL
MDC_IDC_LEAD_LOCATION_DETAIL_1: NORMAL
MDC_IDC_LEAD_MFG: NORMAL
MDC_IDC_LEAD_MODEL: NORMAL
MDC_IDC_LEAD_POLARITY_TYPE: NORMAL
MDC_IDC_LEAD_SERIAL: NORMAL
MDC_IDC_LEAD_SPECIAL_FUNCTION: NORMAL
MDC_IDC_MSMT_BATTERY_DTM: NORMAL
MDC_IDC_MSMT_BATTERY_REMAINING_LONGEVITY: 119 MO
MDC_IDC_MSMT_BATTERY_RRT_TRIGGER: 2.73
MDC_IDC_MSMT_BATTERY_STATUS: NORMAL
MDC_IDC_MSMT_BATTERY_VOLTAGE: 3 V
MDC_IDC_MSMT_CAP_CHARGE_DTM: NORMAL
MDC_IDC_MSMT_CAP_CHARGE_ENERGY: 18 J
MDC_IDC_MSMT_CAP_CHARGE_TIME: 3.58
MDC_IDC_MSMT_CAP_CHARGE_TYPE: NORMAL
MDC_IDC_MSMT_LEADCHNL_RV_IMPEDANCE_VALUE: 304 OHM
MDC_IDC_MSMT_LEADCHNL_RV_IMPEDANCE_VALUE: 342 OHM
MDC_IDC_MSMT_LEADCHNL_RV_PACING_THRESHOLD_AMPLITUDE: 0.5 V
MDC_IDC_MSMT_LEADCHNL_RV_PACING_THRESHOLD_PULSEWIDTH: 0.4 MS
MDC_IDC_MSMT_LEADCHNL_RV_SENSING_INTR_AMPL: 8.88 MV
MDC_IDC_MSMT_LEADCHNL_RV_SENSING_INTR_AMPL: 8.88 MV
MDC_IDC_PG_IMPLANT_DTM: NORMAL
MDC_IDC_PG_MFG: NORMAL
MDC_IDC_PG_MODEL: NORMAL
MDC_IDC_PG_SERIAL: NORMAL
MDC_IDC_PG_TYPE: NORMAL
MDC_IDC_SESS_CLINIC_NAME: NORMAL
MDC_IDC_SESS_DTM: NORMAL
MDC_IDC_SESS_TYPE: NORMAL
MDC_IDC_SET_BRADY_HYSTRATE: NORMAL
MDC_IDC_SET_BRADY_LOWRATE: 40 {BEATS}/MIN
MDC_IDC_SET_BRADY_MODE: NORMAL
MDC_IDC_SET_LEADCHNL_RV_PACING_AMPLITUDE: 2 V
MDC_IDC_SET_LEADCHNL_RV_PACING_ANODE_ELECTRODE_1: NORMAL
MDC_IDC_SET_LEADCHNL_RV_PACING_ANODE_LOCATION_1: NORMAL
MDC_IDC_SET_LEADCHNL_RV_PACING_CAPTURE_MODE: NORMAL
MDC_IDC_SET_LEADCHNL_RV_PACING_CATHODE_ELECTRODE_1: NORMAL
MDC_IDC_SET_LEADCHNL_RV_PACING_CATHODE_LOCATION_1: NORMAL
MDC_IDC_SET_LEADCHNL_RV_PACING_POLARITY: NORMAL
MDC_IDC_SET_LEADCHNL_RV_PACING_PULSEWIDTH: 0.4 MS
MDC_IDC_SET_LEADCHNL_RV_SENSING_ANODE_ELECTRODE_1: NORMAL
MDC_IDC_SET_LEADCHNL_RV_SENSING_ANODE_LOCATION_1: NORMAL
MDC_IDC_SET_LEADCHNL_RV_SENSING_CATHODE_ELECTRODE_1: NORMAL
MDC_IDC_SET_LEADCHNL_RV_SENSING_CATHODE_LOCATION_1: NORMAL
MDC_IDC_SET_LEADCHNL_RV_SENSING_POLARITY: NORMAL
MDC_IDC_SET_LEADCHNL_RV_SENSING_SENSITIVITY: 0.3 MV
MDC_IDC_SET_ZONE_DETECTION_BEATS_DENOMINATOR: 40 {BEATS}
MDC_IDC_SET_ZONE_DETECTION_BEATS_NUMERATOR: 30 {BEATS}
MDC_IDC_SET_ZONE_DETECTION_INTERVAL: 300 MS
MDC_IDC_SET_ZONE_DETECTION_INTERVAL: 360 MS
MDC_IDC_SET_ZONE_DETECTION_INTERVAL: 390 MS
MDC_IDC_SET_ZONE_DETECTION_INTERVAL: NORMAL
MDC_IDC_SET_ZONE_TYPE: NORMAL
MDC_IDC_STAT_AT_BURDEN_PERCENT: 0 %
MDC_IDC_STAT_AT_DTM_END: NORMAL
MDC_IDC_STAT_AT_DTM_START: NORMAL
MDC_IDC_STAT_BRADY_DTM_END: NORMAL
MDC_IDC_STAT_BRADY_DTM_START: NORMAL
MDC_IDC_STAT_BRADY_RV_PERCENT_PACED: 0.01 %
MDC_IDC_STAT_EPISODE_RECENT_COUNT: 0
MDC_IDC_STAT_EPISODE_RECENT_COUNT_DTM_END: NORMAL
MDC_IDC_STAT_EPISODE_RECENT_COUNT_DTM_START: NORMAL
MDC_IDC_STAT_EPISODE_TOTAL_COUNT: 0
MDC_IDC_STAT_EPISODE_TOTAL_COUNT_DTM_END: NORMAL
MDC_IDC_STAT_EPISODE_TOTAL_COUNT_DTM_START: NORMAL
MDC_IDC_STAT_EPISODE_TYPE: NORMAL
MDC_IDC_STAT_TACHYTHERAPY_ATP_DELIVERED_RECENT: 0
MDC_IDC_STAT_TACHYTHERAPY_ATP_DELIVERED_TOTAL: 0
MDC_IDC_STAT_TACHYTHERAPY_RECENT_DTM_END: NORMAL
MDC_IDC_STAT_TACHYTHERAPY_RECENT_DTM_START: NORMAL
MDC_IDC_STAT_TACHYTHERAPY_SHOCKS_ABORTED_RECENT: 0
MDC_IDC_STAT_TACHYTHERAPY_SHOCKS_ABORTED_TOTAL: 0
MDC_IDC_STAT_TACHYTHERAPY_SHOCKS_DELIVERED_RECENT: 0
MDC_IDC_STAT_TACHYTHERAPY_SHOCKS_DELIVERED_TOTAL: 0
MDC_IDC_STAT_TACHYTHERAPY_TOTAL_DTM_END: NORMAL
MDC_IDC_STAT_TACHYTHERAPY_TOTAL_DTM_START: NORMAL

## 2020-10-18 ENCOUNTER — HOSPITAL ENCOUNTER (OUTPATIENT)
Dept: CT IMAGING | Facility: CLINIC | Age: 55
Discharge: HOME | End: 2020-10-18
Attending: INTERNAL MEDICINE | Admitting: INTERNAL MEDICINE
Payer: COMMERCIAL

## 2020-10-18 DIAGNOSIS — R22.1 MASS OF NECK: ICD-10-CM

## 2020-10-18 PROCEDURE — 999N000127 HC STATISTIC PERIPHERAL IV START W US GUIDANCE

## 2020-10-18 PROCEDURE — 250N000009 HC RX 250: Performed by: INTERNAL MEDICINE

## 2020-10-18 PROCEDURE — 70491 CT SOFT TISSUE NECK W/DYE: CPT

## 2020-10-18 PROCEDURE — 250N000011 HC RX IP 250 OP 636: Performed by: INTERNAL MEDICINE

## 2020-10-18 RX ORDER — IOPAMIDOL 755 MG/ML
90 INJECTION, SOLUTION INTRAVASCULAR ONCE
Status: COMPLETED | OUTPATIENT
Start: 2020-10-18 | End: 2020-10-18

## 2020-10-18 RX ADMIN — IOPAMIDOL 90 ML: 755 INJECTION, SOLUTION INTRAVENOUS at 09:22

## 2020-10-18 RX ADMIN — SODIUM CHLORIDE 60 ML: 9 INJECTION, SOLUTION INTRAVENOUS at 09:22

## 2020-11-09 ENCOUNTER — TELEPHONE (OUTPATIENT)
Dept: CARDIOLOGY | Facility: CLINIC | Age: 55
End: 2020-11-09

## 2020-11-09 DIAGNOSIS — I25.5 ISCHEMIC CARDIOMYOPATHY: ICD-10-CM

## 2020-11-09 DIAGNOSIS — I50.22 CHRONIC SYSTOLIC CONGESTIVE HEART FAILURE (H): ICD-10-CM

## 2020-11-09 DIAGNOSIS — I50.23 ACUTE ON CHRONIC SYSTOLIC (CONGESTIVE) HEART FAILURE (H): Primary | ICD-10-CM

## 2020-11-09 RX ORDER — BUMETANIDE 0.5 MG/1
0.5 TABLET ORAL DAILY
Qty: 30 TABLET | Refills: 0 | Status: SHIPPED | OUTPATIENT
Start: 2020-11-09 | End: 2020-12-10

## 2020-11-09 NOTE — TELEPHONE ENCOUNTER
[facility] Call Center    Phone Message    May a detailed message be left on voicemail: no     Reason for Call: Medication Refill Request    Has the patient contacted the pharmacy for the refill? Yes   Name of medication being requested:Bumex  0.5 mg and entresto   Provider who prescribed the medication: Dr. Small  Pharmacy: [facility] in [place]  Date medication is needed: 11/09/2020       Action Taken: Message routed to:  [facility] ([facility]): Cardio    Travel Screening: Not Applicable

## 2020-11-09 NOTE — TELEPHONE ENCOUNTER
Had left patient messages for patient to schedule follow up in [facility] for further refills. Noted that he now has [facility] appointment scheduled for 11/16. Will fill Bumex and Entresto for one month only so doesn't run out.     Called [facility] to let him know this as well as add on lab appointment for next week clinic visit . No answer on home or cell via  and not able to leave messages.     Soraya Messina RN

## 2020-11-12 RX ORDER — BUMETANIDE 0.5 MG/1
TABLET ORAL
Qty: 90 TABLET | OUTPATIENT
Start: 2020-11-12

## 2020-11-13 ENCOUNTER — DOCUMENTATION ONLY (OUTPATIENT)
Dept: CARE COORDINATION | Facility: CLINIC | Age: 55
End: 2020-11-13

## 2020-11-15 NOTE — PROGRESS NOTES
"In person visit  Ipad  was available throughout visit    HPI:   Luke is a 54 year old gentleman with a past medical history of ICM,(EF of 20-25%),  CAD with a RCA STEMI s/p PCI x 7 to RCA, left circumflex (now ), and LAD on 3/27/17 complicated by PEDRO, sepsis, lower GI bleed,sacral ulcer, DVT, HTN, bilateral hemispheric infarcts secondary to watershed ischemia, and cardiogenic shock s/p IABP and subclavian balloon pump.  He also underwent mitral clip for MR.  He returns to clinic for routine follow up. Professional  is present today.    He was last seen in January without any major changes to his regimen. He was due to see Dr. Harris in April with an ECHO prior which was canceled due to patient's covid concerns.    \"Everything is normal\". No SOB at rest. Walked from parking lot to parking lot to here and felt great. At home can walk as far as he wants. Swelling in his legs after a long day of work but after resting for two house this goes away. No abdominal edema. No orthpnea or PND. No chest pain. No palpitations. No lightheadedness or dizziness. Appetite is very good. 143 lbs, weight I down from July. Weight overall stable in the last year.     At hope his blood pressure is 120/80  Hr good.   Weight stable  Never has to take extra bumex    Cardiac Medications  ASA 81 mg daily  Atorvastatin 40 mg qd  Bumex 0.5 mg daily  Metoprolol 200 mg daily  Entresto 49-51 BID       PAST MEDICAL HISTORY:  Past Medical History:   Diagnosis Date     Benign essential hypertension      CAD (coronary artery disease) 2017    RCA STEMI s/p balloon angioplasty and multiple Sofie to RCA, LCx, and LAD     History of deep venous thrombosis 2017    left internal jugular (line-associated); left common femoral; was on coumadin     History of stroke 2017    no residual deficits     Ischemic cardiomyopathy 2017    EF 30-35%     Type 2 diabetes mellitus (H)        FAMILY HISTORY:  Family History   Problem Relation Age of " Onset     Hypertension Mother      Diabetes Type 2  Father      Hypertension Father      Myocardial Infarction No family hx of      Cerebrovascular Disease No family hx of      Coronary Artery Disease Early Onset No family hx of      Colon Cancer No family hx of      Prostate Cancer No family hx of        SOCIAL HISTORY:  Social History     Socioeconomic History     Marital status:      Spouse name: Not on file     Number of children: Not on file     Years of education: Not on file     Highest education level: Not on file   Occupational History     Occupation: [employer]   Social Needs     Financial resource strain: Not on file     Food insecurity     Worry: Not on file     Inability: Not on file     Transportation needs     Medical: Not on file     Non-medical: Not on file   Tobacco Use     Smoking status: Former Smoker     Packs/day: 0.50     Years: 30.00     Pack years: 15.00     Types: Cigarettes     Quit date: 1/1/2017     Years since quitting: 3.8     Smokeless tobacco: Never Used   Substance and Sexual Activity     Alcohol use: No     Drug use: No     Sexual activity: Not Currently   Lifestyle     Physical activity     Days per week: Not on file     Minutes per session: Not on file     Stress: Not on file   Relationships     Social connections     Talks on phone: Not on file     Gets together: Not on file     Attends Gnosticist service: Not on file     Active member of club or organization: Not on file     Attends meetings of clubs or organizations: Not on file     Relationship status: Not on file     Intimate partner violence     Fear of current or ex partner: Not on file     Emotionally abused: Not on file     Physically abused: Not on file     Forced sexual activity: Not on file   Other Topics Concern     Parent/sibling w/ CABG, MI or angioplasty before 65F 55M? Not Asked   Social History Narrative    . Remarried.    4 children with first marriage.    2 grand children.    No formal  "exercise.        CURRENT MEDICATIONS:       aspirin ([employer]S ASPIRIN ADULT LOW DOSE) 81 MG chewable tablet, Take 1 tablet (81 mg) by mouth daily       bumetanide (BUMEX) 0.5 MG tablet, Take 1 tablet (0.5 mg) by mouth daily NEED to follow up in cardiology clinic on 11/16 for further refills. Please come at 6:30 for labs       co-enzyme Q-10 100 MG CAPS capsule, Take by mouth daily       fluticasone (FLONASE) 50 MCG/ACT nasal spray, SPRAY 2 SPRAYS INTO BOTH NOSTRILS DAILY       glipiZIDE (GLUCOTROL) 5 MG tablet, TAKE 1 TABLET (5 MG) BY MOUTH 2 TIMES DAILY (BEFORE MEALS)       metFORMIN (GLUCOPHAGE-XR) 500 MG 24 hr tablet, TAKE 2 TABLETS (1,000 MG) BY MOUTH DAILY (WITH DINNER)       ONETOUCH ULTRA test strip, USE TO TEST BLOOD SUGAR 3 TIMES DAILY OR AS DIRECTED.       order for DME, Equipment being ordered: wrist-thumb immobilizing splint.       sacubitril-valsartan (ENTRESTO) 49-51 MG per tablet, Take 1 tablet by mouth 2 times daily NEED to be seen in cardiology clinic on 11/16 for further refills. Please come at 6:30 for labs.       cetirizine (ZYRTEC) 10 MG tablet, TAKE 1 TABLET (10 MG) BY MOUTH DAILY (Patient not taking: Reported on 11/16/2020)       guaiFENesin-codeine (ROBITUSSIN AC) 100-10 MG/5ML solution, Take 5-10 mLs by mouth every 4 hours as needed (Patient not taking: Reported on 11/16/2020)    No current facility-administered medications on file prior to visit.       ROS:   Refer to HPI    EXAM:  BP (!) 135/90 (BP Location: Right arm, Patient Position: Chair, Cuff Size: Adult Regular)   Pulse 70   Ht 1.575 m (5' 2\")   Wt 64.9 kg (143 lb)   SpO2 97%   BMI 26.16 kg/m    GENERAL: Appears comfortable, in no acute distress.   HEENT: Eye symmetrical, no discharge or icterus bilaterally. Mucous membranes moist and without lesions.  CV: RRR, +S1S2, No murmur, rub, or gallop. JVP ~7.   RESPIRATORY: Respirations regular, even, and unlabored. Lungs CTA throughout.   GI: Soft and non distended with normoactive bowel " sounds present in all quadrants. No tenderness, rebound, guarding. No hepatomegaly.   EXTREMITIES: No peripheral edema. 2+ bilateral pedal pulses.   NEUROLOGIC: Alert and interacting appropriatly. No focal deficits.   MUSCULOSKELETAL: No joint swelling or tenderness.   SKIN: No jaundice. No rashes or lesions.     Labs, reviewed with patient in clinic today:  CBC RESULTS:  Lab Results   Component Value Date    WBC 8.4 10/12/2020    RBC 4.96 10/12/2020    HGB 15.2 10/12/2020    HCT 46.3 10/12/2020    MCV 93 10/12/2020    MCH 30.6 10/12/2020    MCHC 32.8 10/12/2020    RDW 13.5 10/12/2020     10/12/2020       CMP RESULTS:  Lab Results   Component Value Date     11/16/2020    POTASSIUM 4.2 11/16/2020    CHLORIDE 104 11/16/2020    CO2 26 11/16/2020    ANIONGAP 6 11/16/2020     (H) 11/16/2020    BUN 22 11/16/2020    CR 1.07 11/16/2020    GFRESTIMATED 79 11/16/2020    GFRESTBLACK >90 11/16/2020    ROB 8.5 11/16/2020    BILITOTAL 1.2 10/12/2020    ALBUMIN 3.8 10/12/2020    ALKPHOS 70 10/12/2020    ALT 27 10/12/2020    AST 19 10/12/2020        INR RESULTS:  Lab Results   Component Value Date    INR 2.3 (A) 12/26/2017    INR 2.36 (H) 11/03/2017       Lab Results   Component Value Date    MAG 2.4 (H) 06/23/2017     Lab Results   Component Value Date    NTBNPI 9,984 (H) 04/08/2017     Lab Results   Component Value Date    NTBNP 1,599 (H) 09/11/2017       Diagnostics:  9/5/2018 ECHO  Interpretation Summary  Severely (EF 20-25%) reduced left ventricular function is present. Severe  diffuse hypokinesis is present.  Right ventricular function, chamber size, wall motion, and thickness are  normal.  S/P MitraClip with trace residual MR and no significant stenosis (MG 2.5 mmHg  at 72 bpm.)  Moderate tricuspid insufficiency is present.  Right ventricular systolic pressure is 41mmHg above the right atrial pressure.  The inferior vena cava was normal in size with preserved respiratory  variability.  This study was  compared to a prior TTE from 4/18/2018. LVEF has decreased  slightly.    Assessment and Plan:   Luke is a 54 year old gentleman with a past medical history of ICM,(EF of 20-25%),  CAD with a RCA STEMI s/p PCI x 7 to RCA, left circumflex (now ), and LAD on 3/27/17 complicated by PEDRO, sepsis, lower GI bleed,sacral ulcer, DVT, HTN, bilateral hemispheric infarcts secondary to watershed ischemia, and cardiogenic shock s/p IABP and subclavian balloon pump.  He also underwent mitral clip for MR.  He returns to clinic for routine follow up. Professional  is present today via ipad.    Feeling Well. NYHA class II. Weight is overall stable for the last year. Down from July but trending up slowly over the last two years- notes he is eating much more and feels this is all food. No limitations or other concerns. Reports good medication  Compliance. Cr is stable. Exam is euvolemic. BP is slightly elevated. I offered to increase entresto only at night, dfered as he wants to avoid extra labs during covid and because last time he increased entresto his blood pressures dropped. He is willing to try this at his next [facility] appointment.    No medication changes today.    # Chronic systolic heart failure/HFrEF secondary to ICM    Stage C. NYHA Class II.    Fluid status: euvolemic  ACEi/ARB/ARNI: entresto  BB: metoprolol 200 mg daily  Aldosterone antagonist: Defer as previous attempts resulted in worsening renal function.  SCD prophylaxis: ICD  NSAID use: contraindicated  Sleep apnea evaluation: not discussed today  Remote monitoring: monitors own HR, BP and weights at home    # CAD:  Stable.    - Continue Plavix, ASA, Metoprolol, Entresto, and Atorvastatin  - Lipids with next MD appointment    # Mitral valve repair s/p mitral clip:    - Due for an ECHO, will schedule with his next MD appointment    # HTN: BP today 135/80.  Home BP readings are controlled. Continue Entresto 49/51 mg twice daily. Patient unwilling to increase  at this time as prior attempts resulted in hypotension and lightheadedness and need for labs during COVID, but will consider at his next [facility] appointment.      Follow up   - Needs to get a cardiologist- 3 months with general cards with ECHO, BMP and fasting lipid check  - [facility] in 6 months or sooner PRN    20 minutes spent face-to-face with patient, >50% in counseling and/or coordination of care as described above    Annabella Small PA-C          CC  SELF, REFERRED

## 2020-11-16 ENCOUNTER — OFFICE VISIT (OUTPATIENT)
Dept: CARDIOLOGY | Facility: CLINIC | Age: 55
End: 2020-11-16
Attending: PHYSICIAN ASSISTANT
Payer: COMMERCIAL

## 2020-11-16 VITALS
BODY MASS INDEX: 26.31 KG/M2 | HEART RATE: 70 BPM | SYSTOLIC BLOOD PRESSURE: 130 MMHG | WEIGHT: 143 LBS | OXYGEN SATURATION: 97 % | HEIGHT: 62 IN | DIASTOLIC BLOOD PRESSURE: 85 MMHG

## 2020-11-16 DIAGNOSIS — Z95.818 S/P MITRAL VALVE CLIP IMPLANTATION: ICD-10-CM

## 2020-11-16 DIAGNOSIS — Z98.890 S/P MITRAL VALVE CLIP IMPLANTATION: ICD-10-CM

## 2020-11-16 DIAGNOSIS — I50.23 ACUTE ON CHRONIC SYSTOLIC (CONGESTIVE) HEART FAILURE (H): ICD-10-CM

## 2020-11-16 DIAGNOSIS — I50.22 CHRONIC SYSTOLIC CONGESTIVE HEART FAILURE (H): Primary | ICD-10-CM

## 2020-11-16 DIAGNOSIS — I25.10 CORONARY ARTERY DISEASE INVOLVING NATIVE CORONARY ARTERY OF NATIVE HEART WITHOUT ANGINA PECTORIS: ICD-10-CM

## 2020-11-16 LAB
ANION GAP SERPL CALCULATED.3IONS-SCNC: 6 MMOL/L (ref 3–14)
BUN SERPL-MCNC: 22 MG/DL (ref 7–30)
CALCIUM SERPL-MCNC: 8.5 MG/DL (ref 8.5–10.1)
CHLORIDE SERPL-SCNC: 104 MMOL/L (ref 94–109)
CO2 SERPL-SCNC: 26 MMOL/L (ref 20–32)
CREAT SERPL-MCNC: 1.07 MG/DL (ref 0.66–1.25)
GFR SERPL CREATININE-BSD FRML MDRD: 79 ML/MIN/{1.73_M2}
GLUCOSE SERPL-MCNC: 161 MG/DL (ref 70–99)
POTASSIUM SERPL-SCNC: 4.2 MMOL/L (ref 3.4–5.3)
SODIUM SERPL-SCNC: 136 MMOL/L (ref 133–144)

## 2020-11-16 PROCEDURE — 36415 COLL VENOUS BLD VENIPUNCTURE: CPT | Performed by: PATHOLOGY

## 2020-11-16 PROCEDURE — G0463 HOSPITAL OUTPT CLINIC VISIT: HCPCS

## 2020-11-16 PROCEDURE — 99214 OFFICE O/P EST MOD 30 MIN: CPT | Performed by: PHYSICIAN ASSISTANT

## 2020-11-16 PROCEDURE — 80048 BASIC METABOLIC PNL TOTAL CA: CPT | Performed by: PATHOLOGY

## 2020-11-16 RX ORDER — METOPROLOL SUCCINATE 200 MG/1
200 TABLET, EXTENDED RELEASE ORAL DAILY
Qty: 90 TABLET | Refills: 3 | Status: SHIPPED | OUTPATIENT
Start: 2020-11-16 | End: 2021-11-09

## 2020-11-16 RX ORDER — ATORVASTATIN CALCIUM 40 MG/1
40 TABLET, FILM COATED ORAL DAILY
Qty: 90 TABLET | Refills: 3 | Status: SHIPPED | OUTPATIENT
Start: 2020-11-16 | End: 2021-11-09

## 2020-11-16 ASSESSMENT — PAIN SCALES - GENERAL: PAINLEVEL: NO PAIN (0)

## 2020-11-16 ASSESSMENT — MIFFLIN-ST. JEOR: SCORE: 1372.89

## 2020-11-16 NOTE — NURSING NOTE
Chief Complaint   Patient presents with     Follow Up     54 year old male with chronic systolic heart failure presents for follow up with labs prior      Vitals were taken and medications reconciled.     Moisés Langston CMA  7:10 AM

## 2020-11-16 NOTE — PROGRESS NOTES
"\"Everything is normal\". No SOB at rest. Walked from parking lot to parking lot to here and felt great. At home can walk as far as he wants. Swelling in his legs after a long day of work but after resting for two house this goes away. No abdominal edema. No orthpnea or PND. No chest pain. No palpitations. No lightheadedness or dizziness. Appetite is very good. 143 lbs, weight I down from July. Weight overall stable in the last year.     At [facility] his blood pressure is 120/80    Hr good.   Weight stable  Cardiac Medications  ASA 81 mg daily  Atorvastatin 40 mg qd  Bumex 0.5 mg daily  Metoprolol 200 mg daily  Entresto 49-51 BID     Plan:  Offered to increase entresto only at night, dfered as he wants to avoid extra labs during covd  "

## 2020-11-16 NOTE — LETTER
"11/16/2020      RE: Luke Henao  [address]  [facility] [address]       Dear Colleague,    Thank you for the opportunity to participate in the care of your patient, Luke Henao, at the [facility] HEART [facility]. Please see a copy of my visit note below.    In person visit  Ipad  was available throughout visit    HPI:   Luke is a 54 year old gentleman with a past medical history of ICM,(EF of 20-25%),  CAD with a RCA STEMI s/p PCI x 7 to RCA, left circumflex (now ), and LAD on 3/27/17 complicated by PEDRO, sepsis, lower GI bleed,sacral ulcer, DVT, HTN, bilateral hemispheric infarcts secondary to watershed ischemia, and cardiogenic shock s/p IABP and subclavian balloon pump.  He also underwent mitral clip for MR.  He returns to clinic for routine follow up. Professional  is present today.    He was last seen in January without any major changes to his regimen. He was due to see Dr. Harris in April with an ECHO prior which was canceled due to patient's covid concerns.    \"Everything is normal\". No SOB at rest. Walked from parking lot to parking lot to here and felt great. At home can walk as far as he wants. Swelling in his legs after a long day of work but after resting for two house this goes away. No abdominal edema. No orthpnea or PND. No chest pain. No palpitations. No lightheadedness or dizziness. Appetite is very good. 143 lbs, weight I down from July. Weight overall stable in the last year.     At hope his blood pressure is 120/80  Hr good.   Weight stable  Never has to take extra bumex    Cardiac Medications  ASA 81 mg daily  Atorvastatin 40 mg qd  Bumex 0.5 mg daily  Metoprolol 200 mg daily  Entresto 49-51 BID       PAST MEDICAL HISTORY:  Past Medical History:   Diagnosis Date     Benign essential hypertension      CAD (coronary artery disease) 2017    RCA STEMI s/p balloon angioplasty and multiple oSfie to RCA, LCx, " and LAD     History of deep venous thrombosis 2017    left internal jugular (line-associated); left common femoral; was on coumadin     History of stroke 2017    no residual deficits     Ischemic cardiomyopathy 2017    EF 30-35%     Type 2 diabetes mellitus (H)        FAMILY HISTORY:  Family History   Problem Relation Age of Onset     Hypertension Mother      Diabetes Type 2  Father      Hypertension Father      Myocardial Infarction No family hx of      Cerebrovascular Disease No family hx of      Coronary Artery Disease Early Onset No family hx of      Colon Cancer No family hx of      Prostate Cancer No family hx of        SOCIAL HISTORY:  Social History     Socioeconomic History     Marital status:      Spouse name: Not on file     Number of children: Not on file     Years of education: Not on file     Highest education level: Not on file   Occupational History     Occupation: [employer]   Social Needs     Financial resource strain: Not on file     Food insecurity     Worry: Not on file     Inability: Not on file     Transportation needs     Medical: Not on file     Non-medical: Not on file   Tobacco Use     Smoking status: Former Smoker     Packs/day: 0.50     Years: 30.00     Pack years: 15.00     Types: Cigarettes     Quit date: 1/1/2017     Years since quitting: 3.8     Smokeless tobacco: Never Used   Substance and Sexual Activity     Alcohol use: No     Drug use: No     Sexual activity: Not Currently   Lifestyle     Physical activity     Days per week: Not on file     Minutes per session: Not on file     Stress: Not on file   Relationships     Social connections     Talks on phone: Not on file     Gets together: Not on file     Attends Rastafarian service: Not on file     Active member of club or organization: Not on file     Attends meetings of clubs or organizations: Not on file     Relationship status: Not on file     Intimate partner violence     Fear of current or ex partner: Not on file  "    Emotionally abused: Not on file     Physically abused: Not on file     Forced sexual activity: Not on file   Other Topics Concern     Parent/sibling w/ CABG, MI or angioplasty before 65F 55M? Not Asked   Social History Narrative    . Remarried.    4 children with first marriage.    2 grand children.    No formal exercise.        CURRENT MEDICATIONS:       aspirin ([employer] ASPIRIN ADULT LOW DOSE) 81 MG chewable tablet, Take 1 tablet (81 mg) by mouth daily       bumetanide (BUMEX) 0.5 MG tablet, Take 1 tablet (0.5 mg) by mouth daily NEED to follow up in cardiology clinic on 11/16 for further refills. Please come at 6:30 for labs       co-enzyme Q-10 100 MG CAPS capsule, Take by mouth daily       fluticasone (FLONASE) 50 MCG/ACT nasal spray, SPRAY 2 SPRAYS INTO BOTH NOSTRILS DAILY       glipiZIDE (GLUCOTROL) 5 MG tablet, TAKE 1 TABLET (5 MG) BY MOUTH 2 TIMES DAILY (BEFORE MEALS)       metFORMIN (GLUCOPHAGE-XR) 500 MG 24 hr tablet, TAKE 2 TABLETS (1,000 MG) BY MOUTH DAILY (WITH DINNER)       ONETOUCH ULTRA test strip, USE TO TEST BLOOD SUGAR 3 TIMES DAILY OR AS DIRECTED.       order for DME, Equipment being ordered: wrist-thumb immobilizing splint.       sacubitril-valsartan (ENTRESTO) 49-51 MG per tablet, Take 1 tablet by mouth 2 times daily NEED to be seen in cardiology clinic on 11/16 for further refills. Please come at 6:30 for labs.       cetirizine (ZYRTEC) 10 MG tablet, TAKE 1 TABLET (10 MG) BY MOUTH DAILY (Patient not taking: Reported on 11/16/2020)       guaiFENesin-codeine (ROBITUSSIN AC) 100-10 MG/5ML solution, Take 5-10 mLs by mouth every 4 hours as needed (Patient not taking: Reported on 11/16/2020)    No current facility-administered medications on file prior to visit.       ROS:   Refer to HPI    EXAM:  BP (!) 135/90 (BP Location: Right arm, Patient Position: Chair, Cuff Size: Adult Regular)   Pulse 70   Ht 1.575 m (5' 2\")   Wt 64.9 kg (143 lb)   SpO2 97%   BMI 26.16 kg/m    GENERAL: Appears " comfortable, in no acute distress.   HEENT: Eye symmetrical, no discharge or icterus bilaterally. Mucous membranes moist and without lesions.  CV: RRR, +S1S2, No murmur, rub, or gallop. JVP ~7.   RESPIRATORY: Respirations regular, even, and unlabored. Lungs CTA throughout.   GI: Soft and non distended with normoactive bowel sounds present in all quadrants. No tenderness, rebound, guarding. No hepatomegaly.   EXTREMITIES: No peripheral edema. 2+ bilateral pedal pulses.   NEUROLOGIC: Alert and interacting appropriatly. No focal deficits.   MUSCULOSKELETAL: No joint swelling or tenderness.   SKIN: No jaundice. No rashes or lesions.     Labs, reviewed with patient in clinic today:  CBC RESULTS:  Lab Results   Component Value Date    WBC 8.4 10/12/2020    RBC 4.96 10/12/2020    HGB 15.2 10/12/2020    HCT 46.3 10/12/2020    MCV 93 10/12/2020    MCH 30.6 10/12/2020    MCHC 32.8 10/12/2020    RDW 13.5 10/12/2020     10/12/2020       CMP RESULTS:  Lab Results   Component Value Date     11/16/2020    POTASSIUM 4.2 11/16/2020    CHLORIDE 104 11/16/2020    CO2 26 11/16/2020    ANIONGAP 6 11/16/2020     (H) 11/16/2020    BUN 22 11/16/2020    CR 1.07 11/16/2020    GFRESTIMATED 79 11/16/2020    GFRESTBLACK >90 11/16/2020    ROB 8.5 11/16/2020    BILITOTAL 1.2 10/12/2020    ALBUMIN 3.8 10/12/2020    ALKPHOS 70 10/12/2020    ALT 27 10/12/2020    AST 19 10/12/2020        INR RESULTS:  Lab Results   Component Value Date    INR 2.3 (A) 12/26/2017    INR 2.36 (H) 11/03/2017       Lab Results   Component Value Date    MAG 2.4 (H) 06/23/2017     Lab Results   Component Value Date    NTBNPI 9,984 (H) 04/08/2017     Lab Results   Component Value Date    NTBNP 1,599 (H) 09/11/2017       Diagnostics:  9/5/2018 ECHO  Interpretation Summary  Severely (EF 20-25%) reduced left ventricular function is present. Severe  diffuse hypokinesis is present.  Right ventricular function, chamber size, wall motion, and thickness  are  normal.  S/P MitraClip with trace residual MR and no significant stenosis (MG 2.5 mmHg  at 72 bpm.)  Moderate tricuspid insufficiency is present.  Right ventricular systolic pressure is 41mmHg above the right atrial pressure.  The inferior vena cava was normal in size with preserved respiratory  variability.  This study was compared to a prior TTE from 4/18/2018. LVEF has decreased  slightly.    Assessment and Plan:   Luke is a 54 year old gentleman with a past medical history of ICM,(EF of 20-25%),  CAD with a RCA STEMI s/p PCI x 7 to RCA, left circumflex (now ), and LAD on 3/27/17 complicated by PEDRO, sepsis, lower GI bleed,sacral ulcer, DVT, HTN, bilateral hemispheric infarcts secondary to watershed ischemia, and cardiogenic shock s/p IABP and subclavian balloon pump.  He also underwent mitral clip for MR.  He returns to clinic for routine follow up. Professional  is present today via ipad.    Feeling Well. NYHA class II. Weight is overall stable for the last year. Down from July but trending up slowly over the last two years- notes he is eating much more and feels this is all food. No limitations or other concerns. Reports good medication  Compliance. Cr is stable. Exam is euvolemic. BP is slightly elevated. I offered to increase entresto only at night, dfered as he wants to avoid extra labs during covid and because last time he increased entresto his blood pressures dropped. He is willing to try this at his next CORE appointment.    No medication changes today.    # Chronic systolic heart failure/HFrEF secondary to ICM    Stage C. NYHA Class II.    Fluid status: euvolemic  ACEi/ARB/ARNI: entresto  BB: metoprolol 200 mg daily  Aldosterone antagonist: Defer as previous attempts resulted in worsening renal function.  SCD prophylaxis: ICD  NSAID use: contraindicated  Sleep apnea evaluation: not discussed today  Remote monitoring: monitors own HR, BP and weights at home    # CAD:  Stable.    -  Continue Plavix, ASA, Metoprolol, Entresto, and Atorvastatin  - Lipids with next MD appointment    # Mitral valve repair s/p mitral clip:    - Due for an ECHO, will schedule with his next MD appointment    # HTN: BP today 135/80.  Home BP readings are controlled. Continue Entresto 49/51 mg twice daily. Patient unwilling to increase at this time as prior attempts resulted in hypotension and lightheadedness and need for labs during COVID, but will consider at his next CORE appointment.      Follow up   - Needs to get a cardiologist- 3 months with general cards with ECHO, BMP and fasting lipid check  - CORE in 6 months or sooner PRN    20 minutes spent face-to-face with patient, >50% in counseling and/or coordination of care as described above        Please do not hesitate to contact me if you have any questions/concerns.     Sincerely,     Annabella Small PA-C

## 2020-11-16 NOTE — PATIENT INSTRUCTIONS
Take your medicines every day, as directed    Changes made today:  o No medicine changes today   Monitor Your Weight and Symptoms    Contact us if you:      Gain 2 pounds in one day or 5 pounds in one week    Feel more short of breath    Notice more leg swelling    Feel lightheadeded   Change your lifestyle    Limit Salt or Sodium:    2000 mg  Limit Fluids:    2000 mL or approximately 64 ounces  Eat a Heart Healthy Diet    Low in saturated fats  Stay Active:    Aim to move at least 150 minutes every  week         To Contact us    During Business Hours:  [phone], option # 1 (University)  Then option # 4 (medical questions)     After hours, weekends or holidays:   [phone], Option #4  Ask to speak to the On-Call Cardiologist. Inform them you are a CORE/heart failure patient at the [place].     Use [employer] allows you to communicate directly with your heart team through secure messaging.    [email or web address] can be accessed any time on your phone, computer, or tablet.    If you need assistance, we'd be happy to help!         Keep your Heart Appointments:    - Schedule an appointment with a Doctor- (General Cardiology- any doctor, in January) with ECHO, and fasting labs and BMP (kidney check)  - Reschedule with Margarita in 6 months with labs

## 2020-12-10 DIAGNOSIS — I50.22 CHRONIC SYSTOLIC CONGESTIVE HEART FAILURE (H): ICD-10-CM

## 2020-12-10 DIAGNOSIS — I25.5 ISCHEMIC CARDIOMYOPATHY: ICD-10-CM

## 2020-12-10 RX ORDER — BUMETANIDE 0.5 MG/1
0.5 TABLET ORAL DAILY
Qty: 90 TABLET | Refills: 3 | Status: SHIPPED | OUTPATIENT
Start: 2020-12-10 | End: 2021-12-01

## 2020-12-10 NOTE — TELEPHONE ENCOUNTER
[facility] Call Center    Phone Message    May a detailed message be left on voicemail: yes     Reason for Call: Medication Refill Request    Has the patient contacted the pharmacy for the refill? Yes   Name of medication being requested: Entresto, Bumex  Provider who prescribed the medication: Annabella Small  Pharmacy: [facility]/PHARMACY #[id number] [facility] [address] [place]  Date medication is needed: asap       Action Taken: Message routed to:  [facility] ([facility]): med refill    Travel Screening: Not Applicable

## 2020-12-10 NOTE — TELEPHONE ENCOUNTER
Centralized Medication Refill Team note:     bumetanide (BUMEX) 0.5 MG tablet   Route: Take 1 tablet (0.5 mg) by mouth daily NEED to follow up in cardiology clinic on 11/16 for further refills. Please come at 6:30 for labs   Last Written Prescription Date:  11/9/20  Last Fill Quantity: 30,   # refills: 0     sacubitril-valsartan (ENTRESTO) 49-51 MG per tablet Take 1 tablet by mouth 2 times daily NEED to be seen in cardiology clinic on 11/16 for further refills. Please come at 6:30 for labs    Last Written Prescription Date:  11/9/20  Last Fill Quantity: 60,   # refills: 0    Last Office Visit : 11/9/20 Geovanny  [facility]  [facility]  Refill protocol passed.     Pharmacy: [facility]/PHARMACY #[id number] - [facility] [address]  PLan: called in AM with Salvadorean - patient advised that  medications have been sent to requested pharmacy

## 2021-01-01 NOTE — PLAN OF CARE
Problem: Goal Outcome Summary  Goal: Goal Outcome Summary  ST 4E: Cx- Pt off unit to OR.        Infant (Birth)

## 2021-01-18 DIAGNOSIS — I50.22 CHRONIC SYSTOLIC CONGESTIVE HEART FAILURE (H): Primary | ICD-10-CM

## 2021-01-18 NOTE — PROGRESS NOTES
Date: 1/18/2021    Time of Call: 2:38 PM     Diagnosis:  HFrEF     [ VORB ] Ordering provider: Margarita [id number]  Order: BMP     Order received by: Soraya Messina RN      Follow-up/additional notes:

## 2021-01-19 ENCOUNTER — OFFICE VISIT (OUTPATIENT)
Dept: CARDIOLOGY | Facility: CLINIC | Age: 56
End: 2021-01-19
Attending: INTERNAL MEDICINE
Payer: COMMERCIAL

## 2021-01-19 ENCOUNTER — ANCILLARY PROCEDURE (OUTPATIENT)
Dept: CARDIOLOGY | Facility: CLINIC | Age: 56
End: 2021-01-19
Attending: PHYSICIAN ASSISTANT
Payer: COMMERCIAL

## 2021-01-19 VITALS
SYSTOLIC BLOOD PRESSURE: 133 MMHG | HEART RATE: 67 BPM | OXYGEN SATURATION: 97 % | DIASTOLIC BLOOD PRESSURE: 91 MMHG | WEIGHT: 138.2 LBS | HEIGHT: 62 IN | BODY MASS INDEX: 25.43 KG/M2

## 2021-01-19 DIAGNOSIS — Z79.4 TYPE 2 DIABETES MELLITUS WITH OTHER CIRCULATORY COMPLICATION, WITH LONG-TERM CURRENT USE OF INSULIN (H): Primary | ICD-10-CM

## 2021-01-19 DIAGNOSIS — Z98.890 HISTORY OF MITRAL VALVE REPAIR: ICD-10-CM

## 2021-01-19 DIAGNOSIS — Z95.810 ICD (IMPLANTABLE CARDIOVERTER-DEFIBRILLATOR) IN PLACE: ICD-10-CM

## 2021-01-19 DIAGNOSIS — Z79.01 LONG TERM CURRENT USE OF ANTICOAGULANT THERAPY: ICD-10-CM

## 2021-01-19 DIAGNOSIS — I42.9 CARDIOMYOPATHY (H): ICD-10-CM

## 2021-01-19 DIAGNOSIS — I25.5 ISCHEMIC CARDIOMYOPATHY: ICD-10-CM

## 2021-01-19 DIAGNOSIS — I50.22 CHRONIC SYSTOLIC CONGESTIVE HEART FAILURE (H): ICD-10-CM

## 2021-01-19 DIAGNOSIS — I25.10 CORONARY ARTERY DISEASE INVOLVING NATIVE CORONARY ARTERY OF NATIVE HEART WITHOUT ANGINA PECTORIS: ICD-10-CM

## 2021-01-19 DIAGNOSIS — Z95.5 S/P CORONARY ARTERY STENT PLACEMENT: ICD-10-CM

## 2021-01-19 DIAGNOSIS — E11.9 TYPE 2 DIABETES MELLITUS WITHOUT COMPLICATION, WITHOUT LONG-TERM CURRENT USE OF INSULIN (H): ICD-10-CM

## 2021-01-19 DIAGNOSIS — E11.59 TYPE 2 DIABETES MELLITUS WITH OTHER CIRCULATORY COMPLICATION, WITH LONG-TERM CURRENT USE OF INSULIN (H): Primary | ICD-10-CM

## 2021-01-19 DIAGNOSIS — I10 BENIGN ESSENTIAL HYPERTENSION: ICD-10-CM

## 2021-01-19 DIAGNOSIS — E78.2 MIXED HYPERLIPIDEMIA: ICD-10-CM

## 2021-01-19 LAB
ANION GAP SERPL CALCULATED.3IONS-SCNC: 3 MMOL/L (ref 3–14)
BUN SERPL-MCNC: 28 MG/DL (ref 7–30)
CALCIUM SERPL-MCNC: 9 MG/DL (ref 8.5–10.1)
CHLORIDE SERPL-SCNC: 106 MMOL/L (ref 94–109)
CO2 SERPL-SCNC: 30 MMOL/L (ref 20–32)
CREAT SERPL-MCNC: 1.17 MG/DL (ref 0.66–1.25)
GFR SERPL CREATININE-BSD FRML MDRD: 71 ML/MIN/{1.73_M2}
GLUCOSE SERPL-MCNC: 170 MG/DL (ref 70–99)
NT-PROBNP SERPL-MCNC: 1032 PG/ML (ref 0–125)
POTASSIUM SERPL-SCNC: 5 MMOL/L (ref 3.4–5.3)
SODIUM SERPL-SCNC: 139 MMOL/L (ref 133–144)

## 2021-01-19 PROCEDURE — 93306 TTE W/DOPPLER COMPLETE: CPT | Mod: GC | Performed by: INTERNAL MEDICINE

## 2021-01-19 PROCEDURE — 93295 DEV INTERROG REMOTE 1/2/MLT: CPT | Performed by: INTERNAL MEDICINE

## 2021-01-19 PROCEDURE — 36415 COLL VENOUS BLD VENIPUNCTURE: CPT | Performed by: PATHOLOGY

## 2021-01-19 PROCEDURE — 80048 BASIC METABOLIC PNL TOTAL CA: CPT | Performed by: PATHOLOGY

## 2021-01-19 PROCEDURE — 99214 OFFICE O/P EST MOD 30 MIN: CPT | Mod: 25 | Performed by: INTERNAL MEDICINE

## 2021-01-19 PROCEDURE — G0463 HOSPITAL OUTPT CLINIC VISIT: HCPCS | Mod: 25

## 2021-01-19 PROCEDURE — 93296 REM INTERROG EVL PM/IDS: CPT

## 2021-01-19 PROCEDURE — 93005 ELECTROCARDIOGRAM TRACING: CPT

## 2021-01-19 PROCEDURE — 83880 ASSAY OF NATRIURETIC PEPTIDE: CPT | Performed by: INTERNAL MEDICINE

## 2021-01-19 RX ORDER — GLIPIZIDE 5 MG/1
TABLET ORAL
Qty: 180 TABLET | Refills: 0 | Status: SHIPPED | OUTPATIENT
Start: 2021-01-19 | End: 2021-04-16

## 2021-01-19 ASSESSMENT — PAIN SCALES - GENERAL: PAINLEVEL: NO PAIN (0)

## 2021-01-19 ASSESSMENT — MIFFLIN-ST. JEOR: SCORE: 1351.12

## 2021-01-19 NOTE — LETTER
1/19/2021      RE: Luke Henao  [address]  [facility] [address]       Dear Colleague,    Thank you for the opportunity to participate in the care of your patient, Luke Henao, at the [facility] HE[facility]. Please see a copy of my visit note below.    I am delighted to see Luke Henao in consultation.The primary encounter diagnosis was Type 2 diabetes mellitus with other circulatory complication, with long-term current use of insulin (H). Diagnoses of Chronic systolic congestive heart failure (H), Benign essential hypertension, Coronary artery disease involving native coronary artery of native heart without angina pectoris, Ischemic cardiomyopathy, History of mitral valve repair with mitral clip 2017, ICD (implantable cardioverter-defibrillator) in place- Medtronic, single chamber- NOT dependent, Long-term (current) use of anticoagulants [Z79.01], S/P coronary artery stent placement 2017, Mixed hyperlipidemia, and Type 2 diabetes mellitus without complication, without long-term current use of insulin (H) were also pertinent to this visit.   As you know, the patient is a 55 year old  male. He   has a past medical history of Benign essential hypertension, CAD (coronary artery disease) (2017), History of deep venous thrombosis (2017), History of stroke (2017), Hyperlipidemia, Ischemic cardiomyopathy (2017), Myocardial infarction (H), and Type 2 diabetes mellitus (H)..    On this visit, the patient states that he has good exercise tolerance without chest pain.  The patient denies chest pressure/discomfort, dyspnea, palpitations, near-syncope, syncope, orthopnea, paroxysmal nocturnal dyspnea and lower extermity edema.    The patient's cardiovascular risk factors include known cardiac disease, hypertension, high cholesterol, positive family history and diabetes mellitus.    The following portions of the patient's history were reviewed and  updated as appropriate: allergies, current medications, past family history, past medical history, past social history, past surgical history, and the problem list.    PMH: The patient's past medical history includes:    Past Medical History:   Diagnosis Date     Benign essential hypertension      CAD (coronary artery disease) 2017    RCA STEMI s/p balloon angioplasty and multiple Sofie to RCA, LCx, and LAD     History of deep venous thrombosis 2017    left internal jugular (line-associated); left common femoral; was on coumadin     History of stroke 2017    no residual deficits     Hyperlipidemia      Ischemic cardiomyopathy 2017    EF 30-35%     Myocardial infarction (H)      Type 2 diabetes mellitus (H)       Past Surgical History:   Procedure Laterality Date     C INSERT ELECTRD LEADS/REPOSTION  10/27/2017          COLONOSCOPY N/A 4/17/2017    Procedure: COLONOSCOPY;  Surgeon: Rashaad Bundy MD;  Location: [facility]     INSERT INTRAAORTIC BALLOON PUMP Right 4/19/2017    Procedure: INSERT INTRAAORTIC BALLOON PUMP;  Right Subclavian Intra Aortic Balloon Pump Insertion using Maquet 40cc Ballon Catheter, Implentation of 8mm Gelweave Woven Vascular Prosthesis, Removal of Left Femoral Ballon Pump Catheter, Flouroscopy;  Surgeon: Keshav Leung MD;  Location: [facility]     PERCUTANEOUS MITRAL VALVE REPAIR N/A 5/1/2017    Procedure: PERCUTANEOUS MITRAL VALVE REPAIR ANESTHESIA;  Mitraclip Procedure Possible Cardiopulmonary Bypass ;  Surgeon: Ron Cortez MD;  Location: [facility] OR     SUBCLAVIAN AORTIC VALVE IMPLANT N/A 5/8/2017    Procedure: SUBCLAVIAN AORTIC VALVE IMPLANT;  Right Subclavian Graft Removal ;  Surgeon: Keshav Leung MD;  Location: [facility] OR       The patient's medications as of the current encounter are:     Current Outpatient Medications   Medication Sig Dispense Refill     aspirin ([employer] ASPIRIN ADULT LOW DOSE) 81 MG chewable tablet Take 1 tablet (81 mg) by mouth daily 90 tablet 3      atorvastatin (LIPITOR) 40 MG tablet Take 1 tablet (40 mg) by mouth daily 90 tablet 3     bumetanide (BUMEX) 0.5 MG tablet Take 1 tablet (0.5 mg) by mouth daily 90 tablet 3     co-enzyme Q-10 100 MG CAPS capsule Take by mouth daily       fluticasone (FLONASE) 50 MCG/ACT nasal spray SPRAY 2 SPRAYS INTO BOTH NOSTRILS DAILY 48 mL 3     glipiZIDE (GLUCOTROL) 5 MG tablet TAKE 1 TABLET (5 MG) BY MOUTH 2 TIMES DAILY (BEFORE MEALS) 180 tablet 0     metFORMIN (GLUCOPHAGE-XR) 500 MG 24 hr tablet TAKE 2 TABLETS (1,000 MG) BY MOUTH DAILY (WITH DINNER) 180 tablet 3     metoprolol succinate ER (TOPROL-XL) 200 MG 24 hr tablet Take 1 tablet (200 mg) by mouth daily 90 tablet 3     ONETOUCH ULTRA test strip USE TO TEST BLOOD SUGAR 3 TIMES DAILY OR AS DIRECTED. 100 strip 3     sacubitril-valsartan (ENTRESTO) 49-51 MG per tablet Take 1 tablet by mouth 2 times daily 180 tablet 3     cetirizine (ZYRTEC) 10 MG tablet TAKE 1 TABLET (10 MG) BY MOUTH DAILY (Patient not taking: Reported on 11/16/2020) 60 tablet 6     guaiFENesin-codeine (ROBITUSSIN AC) 100-10 MG/5ML solution Take 5-10 mLs by mouth every 4 hours as needed (Patient not taking: Reported on 11/16/2020) 120 mL 0     order for DME Equipment being ordered: wrist-thumb immobilizing splint. (Patient not taking: Reported on 1/19/2021) 1 Units 0       Labs:     Orders Only on 01/19/2021   Component Date Value Ref Range Status     Sodium 01/19/2021 139  133 - 144 mmol/L Final     Potassium 01/19/2021 5.0  3.4 - 5.3 mmol/L Final     Chloride 01/19/2021 106  94 - 109 mmol/L Final     Carbon Dioxide 01/19/2021 30  20 - 32 mmol/L Final     Anion Gap 01/19/2021 3  3 - 14 mmol/L Final     Glucose 01/19/2021 170* 70 - 99 mg/dL Final     Urea Nitrogen 01/19/2021 28  7 - 30 mg/dL Final     Creatinine 01/19/2021 1.17  0.66 - 1.25 mg/dL Final     GFR Estimate 01/19/2021 71  >60 mL/min/[1.73_m2] Final    Comment: Non  GFR Calc  Starting 12/18/2018, serum creatinine based estimated GFR  (eGFR) will be   calculated using the Chronic Kidney Disease Epidemiology Collaboration   (CKD-EPI) equation.       GFR Estimate If Black 01/19/2021 82  >60 mL/min/[1.73_m2] Final    Comment:  GFR Calc  Starting 12/18/2018, serum creatinine based estimated GFR (eGFR) will be   calculated using the Chronic Kidney Disease Epidemiology Collaboration   (CKD-EPI) equation.       Calcium 01/19/2021 9.0  8.5 - 10.1 mg/dL Final       Allergies:  No Known Allergies    Family History:   Family History   Problem Relation Age of Onset     Hypertension Mother      Diabetes Type 2  Father      Hypertension Father      No Known Problems Brother      No Known Problems Sister      No Known Problems Son      No Known Problems Daughter      No Known Problems Sister      No Known Problems Sister      No Known Problems Sister      No Known Problems Sister      No Known Problems Brother      No Known Problems Son      No Known Problems Daughter      Myocardial Infarction No family hx of      Cerebrovascular Disease No family hx of      Coronary Artery Disease Early Onset No family hx of      Colon Cancer No family hx of      Prostate Cancer No family hx of        Psychosocial history:  reports that he quit smoking about 4 years ago. His smoking use included cigarettes. He has a 15.00 pack-year smoking history. He has never used smokeless tobacco. He reports that he does not drink alcohol or use drugs.    Review of systems: negative for, palpitations, exertional chest pain or pressure, paroxysmal nocturnal dyspnea, dyspnea on exertion, orthopnea, lower extremity edema, syncope or near-syncope and claudication    In addition,   General: No change in weight, sleep or appetite.  Normal energy.  No fever or chills  Eyes: Negative for vision changes or eye problems  ENT: No problems with ears, nose or throat.  No difficulty swallowing.  Resp: No coughing, wheezing or shortness of breath  GI: No nausea, vomiting,  heartburn,  "abdominal pain, diarrhea, constipation or change in bowel habits  : No urinary frequency or dysuria, bladder or kidney problems  Musculoskeletal: No significant muscle or joint pains  Neurologic: No headaches, numbness, tingling, weakness, problems with balance or coordination  Psychiatric: No problems with anxiety, depression or mental health  Heme/immune/allergy: No history of bleeding or clotting problems or anemia.  No allergies or immune system problems  Endocrine: Diabetes with insulin  Skin: No rashes,worrisome lesions or skin problems  Vascular:  No claudication, lifestyle limiting or otherwise; no ischemic rest pain; no non-healing ulcers. No weakness, No loss of sensation        Physical examination  Vitals: BP (!) 133/91 (BP Location: Left arm, Patient Position: Supine, Cuff Size: Adult Regular)   Pulse 67   Ht 1.575 m (5' 2\")   Wt 62.7 kg (138 lb 3.2 oz)   SpO2 97%   BMI 25.28 kg/m    BMI= Body mass index is 25.28 kg/m .    In general, the patient is a pleasant male in no apparent distress.    HEENT: Normiocephalic and atraumatic.  PERRLA.  EOMI.  Sclerae white, not injected.  Nares clear.  Pharynx without erythema or exudate.  Dentition intact.    Neck: No adenopathy.  No thyromegaly. Carotids +2/2 bilaterally without bruits.  No jugular venous distension.   Heart:  The PMI is in the 5th ICS in the midclavicular line. There is no heave. Regular rate and rhythm. Normal S1, S2 splits physiologically. No  rub, click, or gallop. ICD in place. 3/6 EDWIN early peaking  Lungs: Clear to asculation.  No ronchi, wheezes, rales.  No dullness to percussion.   Abdomen: Soft, nontender, nondistended. No organomegaly. No AAA.  No bruits.   Extremities: No clubbing, cyanosis, or edema. The pulses were intact bilaterally.   Neurological: The neurological examination reveal a patient who was oriented to person, place, and time.  The remainder of the examination was nonfocal.    Cardiac tests include:    Echo " pending  ECG shows normal sinus rhythm, LAD, age undetermined inferior MI.    Assessment and Plan    1. Ischemic cardiomyopathy - on ASA lipitor, beta blocker, entresto  - ICD in place  - check BNP  - follow up in [facility]  2. CAD - as above  3. DM - on metformin and glipizide  4. HTN - usually under control at home  - will observe  5. Hyperlipidemia - on statin  6. Mitral clip - echo pending    The patient is to return  in 6 months with SCOTTIE. The patient understood the treatment plan as outlined above.  Patient speaks Sami. Interpretor present.      Please do not hesitate to contact me if you have any questions/concerns.     Sincerely,     Zen Ojeda MD

## 2021-01-19 NOTE — PROGRESS NOTES
I am delighted to see Luke Henao in consultation.The primary encounter diagnosis was Type 2 diabetes mellitus with other circulatory complication, with long-term current use of insulin (H). Diagnoses of Chronic systolic congestive heart failure (H), Benign essential hypertension, Coronary artery disease involving native coronary artery of native heart without angina pectoris, Ischemic cardiomyopathy, History of mitral valve repair with mitral clip 2017, ICD (implantable cardioverter-defibrillator) in place- Medtronic, single chamber- NOT dependent, Long-term (current) use of anticoagulants [Z79.01], S/P coronary artery stent placement 2017, Mixed hyperlipidemia, and Type 2 diabetes mellitus without complication, without long-term current use of insulin (H) were also pertinent to this visit.   As you know, the patient is a 55 year old  male. He   has a past medical history of Benign essential hypertension, CAD (coronary artery disease) (2017), History of deep venous thrombosis (2017), History of stroke (2017), Hyperlipidemia, Ischemic cardiomyopathy (2017), Myocardial infarction (H), and Type 2 diabetes mellitus (H)..    On this visit, the patient states that he has good exercise tolerance without chest pain.  The patient denies chest pressure/discomfort, dyspnea, palpitations, near-syncope, syncope, orthopnea, paroxysmal nocturnal dyspnea and lower extermity edema.    The patient's cardiovascular risk factors include known cardiac disease, hypertension, high cholesterol, positive family history and diabetes mellitus.    The following portions of the patient's history were reviewed and updated as appropriate: allergies, current medications, past family history, past medical history, past social history, past surgical history, and the problem list.    PMH: The patient's past medical history includes:    Past Medical History:   Diagnosis Date     Benign essential hypertension      CAD (coronary artery disease)  2017    RCA STEMI s/p balloon angioplasty and multiple Sofie to RCA, LCx, and LAD     History of deep venous thrombosis 2017    left internal jugular (line-associated); left common femoral; was on coumadin     History of stroke 2017    no residual deficits     Hyperlipidemia      Ischemic cardiomyopathy 2017    EF 30-35%     Myocardial infarction (H)      Type 2 diabetes mellitus (H)       Past Surgical History:   Procedure Laterality Date     C INSERT ELECTRD LEADS/REPOSTION  10/27/2017          COLONOSCOPY N/A 4/17/2017    Procedure: COLONOSCOPY;  Surgeon: Rashaad Bundy MD;  Location: [facility]     INSERT INTRAAORTIC BALLOON PUMP Right 4/19/2017    Procedure: INSERT INTRAAORTIC BALLOON PUMP;  Right Subclavian Intra Aortic Balloon Pump Insertion using Maquet 40cc Ballon Catheter, Implentation of 8mm Gelweave Woven Vascular Prosthesis, Removal of Left Femoral Ballon Pump Catheter, Flouroscopy;  Surgeon: Keshav Leung MD;  Location: [facility] OR     PERCUTANEOUS MITRAL VALVE REPAIR N/A 5/1/2017    Procedure: PERCUTANEOUS MITRAL VALVE REPAIR ANESTHESIA;  Mitraclip Procedure Possible Cardiopulmonary Bypass ;  Surgeon: Ron Cortez MD;  Location: [facility]     SUBCLAVIAN AORTIC VALVE IMPLANT N/A 5/8/2017    Procedure: SUBCLAVIAN AORTIC VALVE IMPLANT;  Right Subclavian Graft Removal ;  Surgeon: Keshav Leung MD;  Location: [facility]       The patient's medications as of the current encounter are:     Current Outpatient Medications   Medication Sig Dispense Refill     aspirin ([employer]S ASPIRIN ADULT LOW DOSE) 81 MG chewable tablet Take 1 tablet (81 mg) by mouth daily 90 tablet 3     atorvastatin (LIPITOR) 40 MG tablet Take 1 tablet (40 mg) by mouth daily 90 tablet 3     bumetanide (BUMEX) 0.5 MG tablet Take 1 tablet (0.5 mg) by mouth daily 90 tablet 3     co-enzyme Q-10 100 MG CAPS capsule Take by mouth daily       fluticasone (FLONASE) 50 MCG/ACT nasal spray SPRAY 2 SPRAYS INTO BOTH NOSTRILS DAILY 48 mL 3      glipiZIDE (GLUCOTROL) 5 MG tablet TAKE 1 TABLET (5 MG) BY MOUTH 2 TIMES DAILY (BEFORE MEALS) 180 tablet 0     metFORMIN (GLUCOPHAGE-XR) 500 MG 24 hr tablet TAKE 2 TABLETS (1,000 MG) BY MOUTH DAILY (WITH DINNER) 180 tablet 3     metoprolol succinate ER (TOPROL-XL) 200 MG 24 hr tablet Take 1 tablet (200 mg) by mouth daily 90 tablet 3     ONETOUCH ULTRA test strip USE TO TEST BLOOD SUGAR 3 TIMES DAILY OR AS DIRECTED. 100 strip 3     sacubitril-valsartan (ENTRESTO) 49-51 MG per tablet Take 1 tablet by mouth 2 times daily 180 tablet 3     cetirizine (ZYRTEC) 10 MG tablet TAKE 1 TABLET (10 MG) BY MOUTH DAILY (Patient not taking: Reported on 11/16/2020) 60 tablet 6     guaiFENesin-codeine (ROBITUSSIN AC) 100-10 MG/5ML solution Take 5-10 mLs by mouth every 4 hours as needed (Patient not taking: Reported on 11/16/2020) 120 mL 0     order for DME Equipment being ordered: wrist-thumb immobilizing splint. (Patient not taking: Reported on 1/19/2021) 1 Units 0       Labs:     Orders Only on 01/19/2021   Component Date Value Ref Range Status     Sodium 01/19/2021 139  133 - 144 mmol/L Final     Potassium 01/19/2021 5.0  3.4 - 5.3 mmol/L Final     Chloride 01/19/2021 106  94 - 109 mmol/L Final     Carbon Dioxide 01/19/2021 30  20 - 32 mmol/L Final     Anion Gap 01/19/2021 3  3 - 14 mmol/L Final     Glucose 01/19/2021 170* 70 - 99 mg/dL Final     Urea Nitrogen 01/19/2021 28  7 - 30 mg/dL Final     Creatinine 01/19/2021 1.17  0.66 - 1.25 mg/dL Final     GFR Estimate 01/19/2021 71  >60 mL/min/[1.73_m2] Final    Comment: Non  GFR Calc  Starting 12/18/2018, serum creatinine based estimated GFR (eGFR) will be   calculated using the Chronic Kidney Disease Epidemiology Collaboration   (CKD-EPI) equation.       GFR Estimate If Black 01/19/2021 82  >60 mL/min/[1.73_m2] Final    Comment:  GFR Calc  Starting 12/18/2018, serum creatinine based estimated GFR (eGFR) will be   calculated using the Chronic Kidney  Disease Epidemiology Collaboration   (CKD-EPI) equation.       Calcium 01/19/2021 9.0  8.5 - 10.1 mg/dL Final       Allergies:  No Known Allergies    Family History:   Family History   Problem Relation Age of Onset     Hypertension Mother      Diabetes Type 2  Father      Hypertension Father      No Known Problems Brother      No Known Problems Sister      No Known Problems Son      No Known Problems Daughter      No Known Problems Sister      No Known Problems Sister      No Known Problems Sister      No Known Problems Sister      No Known Problems Brother      No Known Problems Son      No Known Problems Daughter      Myocardial Infarction No family hx of      Cerebrovascular Disease No family hx of      Coronary Artery Disease Early Onset No family hx of      Colon Cancer No family hx of      Prostate Cancer No family hx of        Psychosocial history:  reports that he quit smoking about 4 years ago. His smoking use included cigarettes. He has a 15.00 pack-year smoking history. He has never used smokeless tobacco. He reports that he does not drink alcohol or use drugs.    Review of systems: negative for, palpitations, exertional chest pain or pressure, paroxysmal nocturnal dyspnea, dyspnea on exertion, orthopnea, lower extremity edema, syncope or near-syncope and claudication    In addition,   General: No change in weight, sleep or appetite.  Normal energy.  No fever or chills  Eyes: Negative for vision changes or eye problems  ENT: No problems with ears, nose or throat.  No difficulty swallowing.  Resp: No coughing, wheezing or shortness of breath  GI: No nausea, vomiting,  heartburn, abdominal pain, diarrhea, constipation or change in bowel habits  : No urinary frequency or dysuria, bladder or kidney problems  Musculoskeletal: No significant muscle or joint pains  Neurologic: No headaches, numbness, tingling, weakness, problems with balance or coordination  Psychiatric: No problems with anxiety, depression or  "mental health  Heme/immune/allergy: No history of bleeding or clotting problems or anemia.  No allergies or immune system problems  Endocrine: Diabetes with insulin  Skin: No rashes,worrisome lesions or skin problems  Vascular:  No claudication, lifestyle limiting or otherwise; no ischemic rest pain; no non-healing ulcers. No weakness, No loss of sensation        Physical examination  Vitals: BP (!) 133/91 (BP Location: Left arm, Patient Position: Supine, Cuff Size: Adult Regular)   Pulse 67   Ht 1.575 m (5' 2\")   Wt 62.7 kg (138 lb 3.2 oz)   SpO2 97%   BMI 25.28 kg/m    BMI= Body mass index is 25.28 kg/m .    In general, the patient is a pleasant male in no apparent distress.    HEENT: Normiocephalic and atraumatic.  PERRLA.  EOMI.  Sclerae white, not injected.  Nares clear.  Pharynx without erythema or exudate.  Dentition intact.    Neck: No adenopathy.  No thyromegaly. Carotids +2/2 bilaterally without bruits.  No jugular venous distension.   Heart:  The PMI is in the 5th ICS in the midclavicular line. There is no heave. Regular rate and rhythm. Normal S1, S2 splits physiologically. No  rub, click, or gallop. ICD in place. 3/6 EDWIN early peaking  Lungs: Clear to asculation.  No ronchi, wheezes, rales.  No dullness to percussion.   Abdomen: Soft, nontender, nondistended. No organomegaly. No AAA.  No bruits.   Extremities: No clubbing, cyanosis, or edema. The pulses were intact bilaterally.   Neurological: The neurological examination reveal a patient who was oriented to person, place, and time.  The remainder of the examination was nonfocal.    Cardiac tests include:    Echo pending  ECG shows normal sinus rhythm, LAD, age undetermined inferior MI.    Assessment and Plan    1. Ischemic cardiomyopathy - on ASA lipitor, beta blocker, entresto  - ICD in place  - check BNP  - follow up in [facility]  2. CAD - as above  3. DM - on metformin and glipizide  4. HTN - usually under control at home  - will observe  5. " Hyperlipidemia - on statin  6. Mitral clip - echo pending    The patient is to return  in 6 months with SCOTTIE. The patient understood the treatment plan as outlined above.  Patient speaks Kiswahili. Interpretor present.      Zen Ojeda MD

## 2021-01-19 NOTE — NURSING NOTE
Chief Complaint   Patient presents with     New Patient     NEW- Establish Care with labs and echo prior      Vitals were taken, medications reconciled and EKG performed.     ABHAY Dougherty  8:57 AM

## 2021-01-20 LAB — INTERPRETATION ECG - MUSE: NORMAL

## 2021-01-22 LAB
MDC_IDC_LEAD_IMPLANT_DT: NORMAL
MDC_IDC_LEAD_LOCATION: NORMAL
MDC_IDC_LEAD_LOCATION_DETAIL_1: NORMAL
MDC_IDC_LEAD_MFG: NORMAL
MDC_IDC_LEAD_MODEL: NORMAL
MDC_IDC_LEAD_POLARITY_TYPE: NORMAL
MDC_IDC_LEAD_SERIAL: NORMAL
MDC_IDC_LEAD_SPECIAL_FUNCTION: NORMAL
MDC_IDC_MSMT_BATTERY_DTM: NORMAL
MDC_IDC_MSMT_BATTERY_REMAINING_LONGEVITY: 116 MO
MDC_IDC_MSMT_BATTERY_RRT_TRIGGER: 2.73
MDC_IDC_MSMT_BATTERY_STATUS: NORMAL
MDC_IDC_MSMT_BATTERY_VOLTAGE: 3.02 V
MDC_IDC_MSMT_CAP_CHARGE_DTM: NORMAL
MDC_IDC_MSMT_CAP_CHARGE_ENERGY: 18 J
MDC_IDC_MSMT_CAP_CHARGE_TIME: 3.61
MDC_IDC_MSMT_CAP_CHARGE_TYPE: NORMAL
MDC_IDC_MSMT_LEADCHNL_RV_IMPEDANCE_VALUE: 304 OHM
MDC_IDC_MSMT_LEADCHNL_RV_IMPEDANCE_VALUE: 342 OHM
MDC_IDC_MSMT_LEADCHNL_RV_PACING_THRESHOLD_AMPLITUDE: 0.5 V
MDC_IDC_MSMT_LEADCHNL_RV_PACING_THRESHOLD_PULSEWIDTH: 0.4 MS
MDC_IDC_MSMT_LEADCHNL_RV_SENSING_INTR_AMPL: 8.62 MV
MDC_IDC_MSMT_LEADCHNL_RV_SENSING_INTR_AMPL: 8.62 MV
MDC_IDC_PG_IMPLANT_DTM: NORMAL
MDC_IDC_PG_MFG: NORMAL
MDC_IDC_PG_MODEL: NORMAL
MDC_IDC_PG_SERIAL: NORMAL
MDC_IDC_PG_TYPE: NORMAL
MDC_IDC_SESS_CLINIC_NAME: NORMAL
MDC_IDC_SESS_DTM: NORMAL
MDC_IDC_SESS_TYPE: NORMAL
MDC_IDC_SET_BRADY_HYSTRATE: NORMAL
MDC_IDC_SET_BRADY_LOWRATE: 40 {BEATS}/MIN
MDC_IDC_SET_BRADY_MODE: NORMAL
MDC_IDC_SET_LEADCHNL_RV_PACING_AMPLITUDE: 2 V
MDC_IDC_SET_LEADCHNL_RV_PACING_ANODE_ELECTRODE_1: NORMAL
MDC_IDC_SET_LEADCHNL_RV_PACING_ANODE_LOCATION_1: NORMAL
MDC_IDC_SET_LEADCHNL_RV_PACING_CAPTURE_MODE: NORMAL
MDC_IDC_SET_LEADCHNL_RV_PACING_CATHODE_ELECTRODE_1: NORMAL
MDC_IDC_SET_LEADCHNL_RV_PACING_CATHODE_LOCATION_1: NORMAL
MDC_IDC_SET_LEADCHNL_RV_PACING_POLARITY: NORMAL
MDC_IDC_SET_LEADCHNL_RV_PACING_PULSEWIDTH: 0.4 MS
MDC_IDC_SET_LEADCHNL_RV_SENSING_ANODE_ELECTRODE_1: NORMAL
MDC_IDC_SET_LEADCHNL_RV_SENSING_ANODE_LOCATION_1: NORMAL
MDC_IDC_SET_LEADCHNL_RV_SENSING_CATHODE_ELECTRODE_1: NORMAL
MDC_IDC_SET_LEADCHNL_RV_SENSING_CATHODE_LOCATION_1: NORMAL
MDC_IDC_SET_LEADCHNL_RV_SENSING_POLARITY: NORMAL
MDC_IDC_SET_LEADCHNL_RV_SENSING_SENSITIVITY: 0.3 MV
MDC_IDC_SET_ZONE_DETECTION_BEATS_DENOMINATOR: 40 {BEATS}
MDC_IDC_SET_ZONE_DETECTION_BEATS_NUMERATOR: 30 {BEATS}
MDC_IDC_SET_ZONE_DETECTION_INTERVAL: 300 MS
MDC_IDC_SET_ZONE_DETECTION_INTERVAL: 360 MS
MDC_IDC_SET_ZONE_DETECTION_INTERVAL: 390 MS
MDC_IDC_SET_ZONE_DETECTION_INTERVAL: NORMAL
MDC_IDC_SET_ZONE_TYPE: NORMAL
MDC_IDC_STAT_AT_BURDEN_PERCENT: 0 %
MDC_IDC_STAT_AT_DTM_END: NORMAL
MDC_IDC_STAT_AT_DTM_START: NORMAL
MDC_IDC_STAT_BRADY_DTM_END: NORMAL
MDC_IDC_STAT_BRADY_DTM_START: NORMAL
MDC_IDC_STAT_BRADY_RV_PERCENT_PACED: 0.01 %
MDC_IDC_STAT_EPISODE_RECENT_COUNT: 0
MDC_IDC_STAT_EPISODE_RECENT_COUNT_DTM_END: NORMAL
MDC_IDC_STAT_EPISODE_RECENT_COUNT_DTM_START: NORMAL
MDC_IDC_STAT_EPISODE_TOTAL_COUNT: 0
MDC_IDC_STAT_EPISODE_TOTAL_COUNT_DTM_END: NORMAL
MDC_IDC_STAT_EPISODE_TOTAL_COUNT_DTM_START: NORMAL
MDC_IDC_STAT_EPISODE_TYPE: NORMAL
MDC_IDC_STAT_TACHYTHERAPY_ATP_DELIVERED_RECENT: 0
MDC_IDC_STAT_TACHYTHERAPY_ATP_DELIVERED_TOTAL: 0
MDC_IDC_STAT_TACHYTHERAPY_RECENT_DTM_END: NORMAL
MDC_IDC_STAT_TACHYTHERAPY_RECENT_DTM_START: NORMAL
MDC_IDC_STAT_TACHYTHERAPY_SHOCKS_ABORTED_RECENT: 0
MDC_IDC_STAT_TACHYTHERAPY_SHOCKS_ABORTED_TOTAL: 0
MDC_IDC_STAT_TACHYTHERAPY_SHOCKS_DELIVERED_RECENT: 0
MDC_IDC_STAT_TACHYTHERAPY_SHOCKS_DELIVERED_TOTAL: 0
MDC_IDC_STAT_TACHYTHERAPY_TOTAL_DTM_END: NORMAL
MDC_IDC_STAT_TACHYTHERAPY_TOTAL_DTM_START: NORMAL

## 2021-03-01 NOTE — PROGRESS NOTES
ANTICOAGULATION FOLLOW-UP CLINIC VISIT    Patient Name:  Luke Henao  Date:  6/20/2017  Contact Type:  Face to Face    SUBJECTIVE:     Patient Findings     Positives Change in medications (starting on a medrol dose pack today)           OBJECTIVE    INR Protime   Date Value Ref Range Status   06/20/2017 2.9 (A) 0.86 - 1.14 Final       ASSESSMENT / PLAN  INR assessment THER    Recheck INR In: 1 WEEK    INR Location Clinic      Anticoagulation Summary as of 6/20/2017     INR goal 2.0-3.0   Today's INR 2.9   Maintenance plan 3 mg (3 mg x 1) every day   Full instructions 3 mg every day   Weekly total 21 mg   Plan last modified Shalonda Fitzgerald RN (6/13/2017)   Next INR check 6/27/2017   Target end date     Indications   Stroke (H) [I63.9]         Anticoagulation Episode Summary     INR check location     Preferred lab     Send INR reminders to  [facility]    Comments             See the Encounter Report to view Anticoagulation Flowsheet and Dosing Calendar (Go to Encounters tab in chart review, and find the Anticoagulation Therapy Visit)    Dosage adjustment made based on physician directed care plan.    Pina Coyle RN                Pt is having her Covid 19 shot this Thursday and wanted to speak with  first she is asking for a call back

## 2021-03-02 DIAGNOSIS — Z91.09 ENVIRONMENTAL ALLERGIES: ICD-10-CM

## 2021-03-02 RX ORDER — CETIRIZINE HYDROCHLORIDE 10 MG/1
TABLET ORAL
Qty: 90 TABLET | Refills: 1 | Status: SHIPPED | OUTPATIENT
Start: 2021-03-02 | End: 2021-08-23

## 2021-03-11 DIAGNOSIS — E11.9 TYPE 2 DIABETES MELLITUS WITHOUT COMPLICATION, WITHOUT LONG-TERM CURRENT USE OF INSULIN (H): ICD-10-CM

## 2021-03-11 RX ORDER — METFORMIN HCL 500 MG
1000 TABLET, EXTENDED RELEASE 24 HR ORAL
Qty: 180 TABLET | Refills: 3 | Status: SHIPPED | OUTPATIENT
Start: 2021-03-11 | End: 2022-02-24

## 2021-03-19 ENCOUNTER — TELEPHONE (OUTPATIENT)
Dept: CARDIOLOGY | Facility: CLINIC | Age: 56
End: 2021-03-19

## 2021-03-19 NOTE — TELEPHONE ENCOUNTER
Pt needs to reschedule May 12th appointment with Annabella Small with labs and a device prior.    Current appointments scheduled are cancelled due to the unavailability of the provider.

## 2021-04-15 DIAGNOSIS — E11.9 TYPE 2 DIABETES MELLITUS WITHOUT COMPLICATION, WITHOUT LONG-TERM CURRENT USE OF INSULIN (H): ICD-10-CM

## 2021-04-16 NOTE — TELEPHONE ENCOUNTER
GLIPIZIDE 5 MG TABLET      Last Written Prescription Date:  1/19/21  Last Fill Quantity: 180,   # refills: 0  Last Office Visit : Zen Ojeda MD  Cardiovascular Disease  1/19/2021  [facility] Office visit:  7/6/21    Routing refill request to provider for review/approval because:  Drug not on cardiology refill protocol

## 2021-05-04 DIAGNOSIS — I25.10 CORONARY ARTERY DISEASE INVOLVING NATIVE CORONARY ARTERY OF NATIVE HEART WITHOUT ANGINA PECTORIS: ICD-10-CM

## 2021-05-04 RX ORDER — ASPIRIN 81 MG/1
81 TABLET, CHEWABLE ORAL DAILY
Qty: 90 TABLET | Refills: 2 | Status: SHIPPED | OUTPATIENT
Start: 2021-05-04 | End: 2022-01-25

## 2021-05-04 RX ORDER — GLIPIZIDE 5 MG/1
5 TABLET ORAL
Qty: 60 TABLET | Refills: 0 | Status: SHIPPED | OUTPATIENT
Start: 2021-05-04 | End: 2021-05-31

## 2021-05-19 ENCOUNTER — OFFICE VISIT (OUTPATIENT)
Dept: INTERNAL MEDICINE | Facility: CLINIC | Age: 56
End: 2021-05-19
Payer: COMMERCIAL

## 2021-05-19 VITALS
OXYGEN SATURATION: 98 % | SYSTOLIC BLOOD PRESSURE: 138 MMHG | BODY MASS INDEX: 25.61 KG/M2 | HEART RATE: 75 BPM | TEMPERATURE: 97.1 F | DIASTOLIC BLOOD PRESSURE: 98 MMHG | RESPIRATION RATE: 16 BRPM | WEIGHT: 140 LBS

## 2021-05-19 DIAGNOSIS — H10.13 ALLERGIC CONJUNCTIVITIS, BILATERAL: ICD-10-CM

## 2021-05-19 DIAGNOSIS — R21 RASH: ICD-10-CM

## 2021-05-19 DIAGNOSIS — I10 BENIGN ESSENTIAL HYPERTENSION: ICD-10-CM

## 2021-05-19 DIAGNOSIS — E11.9 TYPE 2 DIABETES MELLITUS WITHOUT COMPLICATION, WITHOUT LONG-TERM CURRENT USE OF INSULIN (H): Primary | ICD-10-CM

## 2021-05-19 LAB
ALBUMIN SERPL-MCNC: 3.6 G/DL (ref 3.4–5)
ALP SERPL-CCNC: 75 U/L (ref 40–150)
ALT SERPL W P-5'-P-CCNC: 35 U/L (ref 0–70)
ANION GAP SERPL CALCULATED.3IONS-SCNC: <1 MMOL/L (ref 3–14)
AST SERPL W P-5'-P-CCNC: 28 U/L (ref 0–45)
BILIRUB SERPL-MCNC: 2 MG/DL (ref 0.2–1.3)
BUN SERPL-MCNC: 24 MG/DL (ref 7–30)
CALCIUM SERPL-MCNC: 8.9 MG/DL (ref 8.5–10.1)
CHLORIDE SERPL-SCNC: 106 MMOL/L (ref 94–109)
CO2 SERPL-SCNC: 33 MMOL/L (ref 20–32)
CREAT SERPL-MCNC: 1.18 MG/DL (ref 0.66–1.25)
ERYTHROCYTE [DISTWIDTH] IN BLOOD BY AUTOMATED COUNT: 15.3 % (ref 10–15)
GFR SERPL CREATININE-BSD FRML MDRD: 70 ML/MIN/{1.73_M2}
GLUCOSE SERPL-MCNC: 117 MG/DL (ref 70–99)
HBA1C MFR BLD: 6.8 % (ref 0–5.6)
HCT VFR BLD AUTO: 48.5 % (ref 40–53)
HGB BLD-MCNC: 15.5 G/DL (ref 13.3–17.7)
MCH RBC QN AUTO: 29.6 PG (ref 26.5–33)
MCHC RBC AUTO-ENTMCNC: 32 G/DL (ref 31.5–36.5)
MCV RBC AUTO: 93 FL (ref 78–100)
PLATELET # BLD AUTO: 196 10E9/L (ref 150–450)
POTASSIUM SERPL-SCNC: 4.9 MMOL/L (ref 3.4–5.3)
PROT SERPL-MCNC: 7 G/DL (ref 6.8–8.8)
RBC # BLD AUTO: 5.23 10E12/L (ref 4.4–5.9)
SODIUM SERPL-SCNC: 139 MMOL/L (ref 133–144)
WBC # BLD AUTO: 8.8 10E9/L (ref 4–11)

## 2021-05-19 PROCEDURE — 99214 OFFICE O/P EST MOD 30 MIN: CPT | Performed by: INTERNAL MEDICINE

## 2021-05-19 PROCEDURE — 36415 COLL VENOUS BLD VENIPUNCTURE: CPT | Performed by: INTERNAL MEDICINE

## 2021-05-19 PROCEDURE — 80061 LIPID PANEL: CPT | Performed by: INTERNAL MEDICINE

## 2021-05-19 PROCEDURE — 83036 HEMOGLOBIN GLYCOSYLATED A1C: CPT | Performed by: INTERNAL MEDICINE

## 2021-05-19 PROCEDURE — 80053 COMPREHEN METABOLIC PANEL: CPT | Performed by: INTERNAL MEDICINE

## 2021-05-19 PROCEDURE — 85027 COMPLETE CBC AUTOMATED: CPT | Performed by: INTERNAL MEDICINE

## 2021-05-19 RX ORDER — OLOPATADINE HYDROCHLORIDE 1 MG/ML
1 SOLUTION/ DROPS OPHTHALMIC 2 TIMES DAILY
Qty: 15 ML | Refills: 1 | Status: SHIPPED | OUTPATIENT
Start: 2021-05-19 | End: 2022-04-07

## 2021-05-19 NOTE — PROGRESS NOTES
ASSESSMENT/PLAN                                                      (E11.9) Type 2 diabetes mellitus without complication, without long-term current use of insulin (H)  (primary encounter diagnosis)  Plan: Fasting labs today; recommendations to follow.    (H10.13) Allergic conjunctivitis, bilateral  Plan: olopatadine eyedrops prescribed.    (R21) Rash  Comment: hyperpigmentation bilateral cheeks.  Plan: referred to dermatology for further evaluation - patient to schedule.     (I10) Benign essential hypertension  Comment: poorly-controlled on current regimen.   Plan: will discuss blood pressure management with patient's cardiologist.    Shanna Church MD   [place] [address] [address] [address]  T: [phone], F: [phone]    YAMILE Henao is a very pleasant 55 year old male who presents for diabetes follow-up:    Mild language barrier. Patient declines .    Patient's current diabetes regimen is glipizide 5 mg daily and Metformin XR 1000 mg daily. He is adherent to his diabetic regimen and denies episodes of hypoglycemia. He is adherent to his diabetic diet. No diabetic complications. Annual diabetic eye exam is up to date. He is checking his feet regularly. His pertinent vaccinations (Pneumovax, influenza) are up to date. He is on a daily baby aspirin, a statin and an ACE-I or ARB.     Blood pressure is NOT well-controlled on current medications. Current medications include Toprol- mg daily and Entresto 49-51 mg twice daily. He is asymptomatic from a blood pressure perspective: no chest pain or palpitations, no shortness of breath, no light-headedness or dizziness, no headaches or vision changes.     Unrelated to above, patient complains of itchy, swollen eyes related to ongoing allergies.  No significant improvement with daily Zyrtec use.    Patient also complains of an ongoing  rash/discoloration involving both cheeks.    OBJECTIVE                                                      BP (!) 138/98   Pulse 75   Temp 97.1  F (36.2  C) (Temporal)   Resp 16   Wt 63.5 kg (140 lb)   SpO2 98%   BMI 25.61 kg/m    Constitutional: well-appearing  Respiratory: normal respiratory effort; clear to auscultation bilaterally  Cardiovascular: regular rate and rhythm; no edema  Integumentary: hyperpigmented patches and macules bilateral, lateral cheeks    ---    (Note documentation was completed, in part, with [employer] voice-recognition software. Documentation was reviewed, but some grammatical, spelling, and word errors may remain.)

## 2021-05-19 NOTE — LETTER
05/21/21      Luke Henao  [address]  [facility] [address]        Dear ,    It was a pleasure seeing you again the other day! I hope this finds you well.    I wanted to let you know that your labs came back as normal. Your diabetes continues to be well-controlled.     Based on your results, please continue your current medications without change    Please feel free to contact me with any questions or concerns.      Take care,    Shanna Church MD   [place] [address], [place] [address]  T: [phone], F: [phone]

## 2021-05-19 NOTE — Clinical Note
Good afternoon, Dr. Ojeda,    I was wondering if you have any suggestions re: Mr. Henao's blood pressure management.  His blood pressures are mildly elevated on his current regimen (Toprol- mg daily and Entresto 49-51 mg twice daily).  Would it be too much to increase his Entresto dose to 97-103mg bid?    Thank you for your consideration,    Apolinar

## 2021-05-21 LAB
CHOLEST SERPL-MCNC: 124 MG/DL
HDLC SERPL-MCNC: 38 MG/DL
LDLC SERPL CALC-MCNC: 63 MG/DL
NONHDLC SERPL-MCNC: 86 MG/DL
TRIGL SERPL-MCNC: 116 MG/DL

## 2021-05-27 ENCOUNTER — TRANSFERRED RECORDS (OUTPATIENT)
Dept: HEALTH INFORMATION MANAGEMENT | Facility: CLINIC | Age: 56
End: 2021-05-27

## 2021-05-27 DIAGNOSIS — E11.9 TYPE 2 DIABETES MELLITUS WITHOUT COMPLICATION, WITHOUT LONG-TERM CURRENT USE OF INSULIN (H): ICD-10-CM

## 2021-05-27 LAB — RETINOPATHY: POSITIVE

## 2021-05-31 NOTE — TELEPHONE ENCOUNTER
GLIPIZIDE 5 MG TABLET    Last Written Prescription Date:  5/4/21  Last Fill Quantity: 60,   # refills: 0  Last Office Visit : 1/19/21  Future Office visit:  7/6/21    Routing refill request to provider for review/approval because:  Drug not on the [facility] or [facility] refill protocol or controlled substance  Thank-you, Gerda FINCH RN Medication Refill Team

## 2021-06-10 ENCOUNTER — TRANSFERRED RECORDS (OUTPATIENT)
Dept: HEALTH INFORMATION MANAGEMENT | Facility: CLINIC | Age: 56
End: 2021-06-10

## 2021-06-10 LAB — RETINOPATHY: POSITIVE

## 2021-06-25 RX ORDER — GLIPIZIDE 5 MG/1
5 TABLET ORAL
Qty: 60 TABLET | Refills: 0 | Status: SHIPPED | OUTPATIENT
Start: 2021-06-25 | End: 2022-04-04

## 2021-07-06 ENCOUNTER — OFFICE VISIT (OUTPATIENT)
Dept: CARDIOLOGY | Facility: CLINIC | Age: 56
End: 2021-07-06
Attending: INTERNAL MEDICINE
Payer: COMMERCIAL

## 2021-07-06 VITALS
SYSTOLIC BLOOD PRESSURE: 141 MMHG | WEIGHT: 130.6 LBS | BODY MASS INDEX: 23.89 KG/M2 | HEART RATE: 57 BPM | DIASTOLIC BLOOD PRESSURE: 84 MMHG | OXYGEN SATURATION: 100 %

## 2021-07-06 DIAGNOSIS — Z98.890 HISTORY OF MITRAL VALVE REPAIR: ICD-10-CM

## 2021-07-06 DIAGNOSIS — E78.2 MIXED HYPERLIPIDEMIA: Primary | ICD-10-CM

## 2021-07-06 DIAGNOSIS — Z95.810 ICD (IMPLANTABLE CARDIOVERTER-DEFIBRILLATOR) IN PLACE: ICD-10-CM

## 2021-07-06 DIAGNOSIS — I25.5 ISCHEMIC CARDIOMYOPATHY: ICD-10-CM

## 2021-07-06 DIAGNOSIS — I10 BENIGN ESSENTIAL HYPERTENSION: ICD-10-CM

## 2021-07-06 DIAGNOSIS — E11.59 TYPE 2 DIABETES MELLITUS WITH OTHER CIRCULATORY COMPLICATION, WITHOUT LONG-TERM CURRENT USE OF INSULIN (H): ICD-10-CM

## 2021-07-06 DIAGNOSIS — I50.22 CHRONIC SYSTOLIC CONGESTIVE HEART FAILURE (H): ICD-10-CM

## 2021-07-06 PROCEDURE — G0463 HOSPITAL OUTPT CLINIC VISIT: HCPCS

## 2021-07-06 PROCEDURE — 99213 OFFICE O/P EST LOW 20 MIN: CPT | Performed by: INTERNAL MEDICINE

## 2021-07-06 ASSESSMENT — PAIN SCALES - GENERAL: PAINLEVEL: NO PAIN (0)

## 2021-07-06 NOTE — LETTER
7/6/2021      RE: Luke Henao  [address]  [facility] [address]       Dear Colleague,    Thank you for the opportunity to participate in the care of your patient, Luke Henao, at the [facility] [place] at [facility]. Please see a copy of my visit note below.    I am delighted to see Luke Henao in consultation.The primary encounter diagnosis was Mixed hyperlipidemia. Diagnoses of Type 2 diabetes mellitus with other circulatory complication, without long-term current use of insulin (H), Benign essential hypertension, Chronic systolic congestive heart failure (H), Ischemic cardiomyopathy, History of mitral valve repair with mitral clip 2017, and ICD (implantable cardioverter-defibrillator) in place- Medtronic, single chamber- NOT dependent were also pertinent to this visit.   As you know, the patient is a 55 year old  male. He   has a past medical history of Benign essential hypertension, CAD (coronary artery disease) (2017), History of deep venous thrombosis (2017), History of stroke (2017), Hyperlipidemia, Ischemic cardiomyopathy (2017), Myocardial infarction (H), and Type 2 diabetes mellitus (H)..    On this visit, the patient states that he has no cardiac symptoms.  The patient denies chest pressure/discomfort, palpitations, near-syncope, syncope, orthopnea, paroxysmal nocturnal dyspnea and lower extermity edema.    The patient's cardiovascular risk factors include known cardiac disease, hypertension, high cholesterol and diabetes mellitus.    The following portions of the patient's history were reviewed and updated as appropriate: allergies, current medications, past family history, past medical history, past social history, past surgical history, and the problem list.    PMH: The patient's past medical history includes:    Past Medical History:   Diagnosis Date     Benign essential hypertension      CAD (coronary artery disease) 2017     RCA STEMI s/p balloon angioplasty and multiple Sofie to RCA, LCx, and LAD     History of deep venous thrombosis 2017    left internal jugular (line-associated); left common femoral; was on coumadin     History of stroke 2017    no residual deficits     Hyperlipidemia      Ischemic cardiomyopathy 2017    EF 30-35%     Myocardial infarction (H)      Type 2 diabetes mellitus (H)       Past Surgical History:   Procedure Laterality Date     C INSERT ELECTRD LEADS/REPOSTION  10/27/2017          COLONOSCOPY N/A 4/17/2017    Procedure: COLONOSCOPY;  Surgeon: Rashaad Bundy MD;  Location: [facility]     INSERT INTRAAORTIC BALLOON PUMP Right 4/19/2017    Procedure: INSERT INTRAAORTIC BALLOON PUMP;  Right Subclavian Intra Aortic Balloon Pump Insertion using Maquet 40cc Ballon Catheter, Implentation of 8mm Gelweave Woven Vascular Prosthesis, Removal of Left Femoral Ballon Pump Catheter, Flouroscopy;  Surgeon: Keshav Leung MD;  Location: [facility]     PERCUTANEOUS MITRAL VALVE REPAIR N/A 5/1/2017    Procedure: PERCUTANEOUS MITRAL VALVE REPAIR ANESTHESIA;  Mitraclip Procedure Possible Cardiopulmonary Bypass ;  Surgeon: Ron Cortez MD;  Location: [facility]     SUBCLAVIAN AORTIC VALVE IMPLANT N/A 5/8/2017    Procedure: SUBCLAVIAN AORTIC VALVE IMPLANT;  Right Subclavian Graft Removal ;  Surgeon: Keshav Leung MD;  Location: [facility]       The patient's medications as of the current encounter are:     Current Outpatient Medications   Medication Sig Dispense Refill     aspirin ([employer] ASPIRIN ADULT LOW DOSE) 81 MG chewable tablet Take 1 tablet (81 mg) by mouth daily 90 tablet 2     atorvastatin (LIPITOR) 40 MG tablet Take 1 tablet (40 mg) by mouth daily 90 tablet 3     bumetanide (BUMEX) 0.5 MG tablet Take 1 tablet (0.5 mg) by mouth daily 90 tablet 3     cetirizine (ZYRTEC) 10 MG tablet TAKE 1 TABLET (10 MG) BY MOUTH DAILY 90 tablet 1     co-enzyme Q-10 100 MG CAPS capsule Take by mouth daily       fluticasone  (FLONASE) 50 MCG/ACT nasal spray SPRAY 2 SPRAYS INTO BOTH NOSTRILS DAILY 48 mL 3     glipiZIDE (GLUCOTROL) 5 MG tablet Take 1 tablet (5 mg) by mouth 2 times daily (before meals) - further refills must be obtained from PCP or endocrinologist 60 tablet 0     metFORMIN (GLUCOPHAGE-XR) 500 MG 24 hr tablet TAKE 2 TABLETS (1,000 MG) BY MOUTH DAILY (WITH DINNER) 180 tablet 3     metoprolol succinate ER (TOPROL-XL) 200 MG 24 hr tablet Take 1 tablet (200 mg) by mouth daily 90 tablet 3     olopatadine (PATANOL) 0.1 % ophthalmic solution Place 1 drop into both eyes 2 times daily 15 mL 1     ONETOUCH ULTRA test strip USE TO TEST BLOOD SUGAR 3 TIMES DAILY OR AS DIRECTED. 100 strip 3     sacubitril-valsartan (ENTRESTO) 49-51 MG per tablet Take 1 tablet by mouth 2 times daily 180 tablet 3       Labs:     Transferred Records on 06/10/2021   Component Date Value Ref Range Status     RETINOPATHY 06/10/2021 POSITIVE*  Final       Allergies:  No Known Allergies    Family History:   Family History   Problem Relation Age of Onset     Hypertension Mother      Diabetes Type 2  Father      Hypertension Father      No Known Problems Brother      No Known Problems Sister      No Known Problems Son      No Known Problems Daughter      No Known Problems Sister      No Known Problems Sister      No Known Problems Sister      No Known Problems Sister      No Known Problems Brother      No Known Problems Son      No Known Problems Daughter      Myocardial Infarction No family hx of      Cerebrovascular Disease No family hx of      Coronary Artery Disease Early Onset No family hx of      Colon Cancer No family hx of      Prostate Cancer No family hx of        Psychosocial history:  reports that he quit smoking about 4 years ago. His smoking use included cigarettes. He has a 15.00 pack-year smoking history. He has never used smokeless tobacco. He reports that he does not drink alcohol or use drugs.    Review of systems: negative for, palpitations,  exertional chest pain or pressure, paroxysmal nocturnal dyspnea, dyspnea on exertion, orthopnea, lower extremity edema, syncope or near-syncope and claudication    In addition,   General: No change in weight, sleep or appetite.  Normal energy.  No fever or chills  Eyes: Negative for vision changes or eye problems  ENT: No problems with ears, nose or throat.  No difficulty swallowing.  Resp: No coughing, wheezing or shortness of breath  GI: No nausea, vomiting,  heartburn, abdominal pain, diarrhea, constipation or change in bowel habits  : No urinary frequency or dysuria, bladder or kidney problems  Musculoskeletal: No significant muscle or joint pains  Neurologic: No headaches, numbness, tingling, weakness, problems with balance or coordination  Psychiatric: No problems with anxiety, depression or mental health  Heme/immune/allergy: No history of bleeding or clotting problems or anemia.    Endocrine: Diabetes HbA1C < 7  Skin: No rashes,worrisome lesions or skin problems  Vascular:  No claudication, lifestyle limiting or otherwise; no ischemic rest pain; no non-healing ulcers. No weakness, No loss of sensation        Physical examination  Vitals: BP (!) 141/84 (BP Location: Right arm, Patient Position: Chair, Cuff Size: Adult Regular)   Pulse 57   Wt 59.2 kg (130 lb 9.6 oz)   SpO2 100%   BMI 23.89 kg/m    BMI= Body mass index is 23.89 kg/m .    In general, the patient is a pleasant male in no apparent distress.    HEENT: Normiocephalic and atraumatic.  PERRLA.  EOMI.  Sclerae white, not injected.  Nares clear.  Pharynx without erythema or exudate.  Dentition intact.    Neck: No adenopathy.  No thyromegaly. Carotids +2/2 bilaterally without bruits.  No jugular venous distension.   Heart:  The PMI is in the 5th ICS in the midclavicular line. There is no heave. Regular rate and rhythm. Normal S1, S2 splits physiologically. No murmur, rub, click, or gallop.    Lungs: Clear to asculation.  No ronchi, wheezes, rales.   No dullness to percussion.   Abdomen: Soft, nontender, nondistended. No organomegaly. No AAA.  No bruits.   Extremities: No clubbing, cyanosis, or edema. The pulses were intact bilaterally.   Neurological: The neurological examination reveal a patient who was oriented to person, place, and time.  The remainder of the examination was nonfocal.    Cardiac tests include:    1/2021 - echo shows EF 15-20%, mild mitral regurgitation, regional wall motion defects, moderate pulmonary HTN    Assessment and Plan    1. Ischemic cardiomyopathy - on ASA lipitor, beta blocker, entresto  - ICD in place  - follow up in [facility]  2. CAD - as above  3. DM - on metformin and glipizide  4. HTN - usually under control at home  - will observe  5. Hyperlipidemia - on statin  6. Mitral clip - stable     The patient is to return  in 6 months with SCOTTIE. The patient understood the treatment plan as outlined above.  Patient speaks Nepali. Interpretor present.       Zen Ojeda MD

## 2021-07-06 NOTE — NURSING NOTE
Chief Complaint   Patient presents with     Follow Up     6 month follow up      Vitals were taken and medications reconciled.    Kofi Lipscomb, EMT  8:00 AM

## 2021-07-06 NOTE — PROGRESS NOTES
I am delighted to see Luke Henao in consultation.The primary encounter diagnosis was Mixed hyperlipidemia. Diagnoses of Type 2 diabetes mellitus with other circulatory complication, without long-term current use of insulin (H), Benign essential hypertension, Chronic systolic congestive heart failure (H), Ischemic cardiomyopathy, History of mitral valve repair with mitral clip 2017, and ICD (implantable cardioverter-defibrillator) in place- Medtronic, single chamber- NOT dependent were also pertinent to this visit.   As you know, the patient is a 55 year old  male. He   has a past medical history of Benign essential hypertension, CAD (coronary artery disease) (2017), History of deep venous thrombosis (2017), History of stroke (2017), Hyperlipidemia, Ischemic cardiomyopathy (2017), Myocardial infarction (H), and Type 2 diabetes mellitus (H)..    On this visit, the patient states that he has no cardiac symptoms.  The patient denies chest pressure/discomfort, palpitations, near-syncope, syncope, orthopnea, paroxysmal nocturnal dyspnea and lower extermity edema.    The patient's cardiovascular risk factors include known cardiac disease, hypertension, high cholesterol and diabetes mellitus.    The following portions of the patient's history were reviewed and updated as appropriate: allergies, current medications, past family history, past medical history, past social history, past surgical history, and the problem list.    PMH: The patient's past medical history includes:    Past Medical History:   Diagnosis Date     Benign essential hypertension      CAD (coronary artery disease) 2017    RCA STEMI s/p balloon angioplasty and multiple Sofie to RCA, LCx, and LAD     History of deep venous thrombosis 2017    left internal jugular (line-associated); left common femoral; was on coumadin     History of stroke 2017    no residual deficits     Hyperlipidemia      Ischemic cardiomyopathy 2017    EF 30-35%     Myocardial  infarction (H)      Type 2 diabetes mellitus (H)       Past Surgical History:   Procedure Laterality Date     C INSERT ELECTRD LEADS/REPOSTION  10/27/2017          COLONOSCOPY N/A 4/17/2017    Procedure: COLONOSCOPY;  Surgeon: Rashaad Bundy MD;  Location: [facility]     INSERT INTRAAORTIC BALLOON PUMP Right 4/19/2017    Procedure: INSERT INTRAAORTIC BALLOON PUMP;  Right Subclavian Intra Aortic Balloon Pump Insertion using Maquet 40cc Ballon Catheter, Implentation of 8mm Gelweave Woven Vascular Prosthesis, Removal of Left Femoral Ballon Pump Catheter, Flouroscopy;  Surgeon: Keshav Leung MD;  Location: [facility]     PERCUTANEOUS MITRAL VALVE REPAIR N/A 5/1/2017    Procedure: PERCUTANEOUS MITRAL VALVE REPAIR ANESTHESIA;  Mitraclip Procedure Possible Cardiopulmonary Bypass ;  Surgeon: Ron Cortez MD;  Location: [facility]     SUBCLAVIAN AORTIC VALVE IMPLANT N/A 5/8/2017    Procedure: SUBCLAVIAN AORTIC VALVE IMPLANT;  Right Subclavian Graft Removal ;  Surgeon: Keshav Leung MD;  Location: [facility]       The patient's medications as of the current encounter are:     Current Outpatient Medications   Medication Sig Dispense Refill     aspirin ([employer]S ASPIRIN ADULT LOW DOSE) 81 MG chewable tablet Take 1 tablet (81 mg) by mouth daily 90 tablet 2     atorvastatin (LIPITOR) 40 MG tablet Take 1 tablet (40 mg) by mouth daily 90 tablet 3     bumetanide (BUMEX) 0.5 MG tablet Take 1 tablet (0.5 mg) by mouth daily 90 tablet 3     cetirizine (ZYRTEC) 10 MG tablet TAKE 1 TABLET (10 MG) BY MOUTH DAILY 90 tablet 1     co-enzyme Q-10 100 MG CAPS capsule Take by mouth daily       fluticasone (FLONASE) 50 MCG/ACT nasal spray SPRAY 2 SPRAYS INTO BOTH NOSTRILS DAILY 48 mL 3     glipiZIDE (GLUCOTROL) 5 MG tablet Take 1 tablet (5 mg) by mouth 2 times daily (before meals) - further refills must be obtained from PCP or endocrinologist 60 tablet 0     metFORMIN (GLUCOPHAGE-XR) 500 MG 24 hr tablet TAKE 2 TABLETS (1,000 MG) BY  MOUTH DAILY (WITH DINNER) 180 tablet 3     metoprolol succinate ER (TOPROL-XL) 200 MG 24 hr tablet Take 1 tablet (200 mg) by mouth daily 90 tablet 3     olopatadine (PATANOL) 0.1 % ophthalmic solution Place 1 drop into both eyes 2 times daily 15 mL 1     ONETOUCH ULTRA test strip USE TO TEST BLOOD SUGAR 3 TIMES DAILY OR AS DIRECTED. 100 strip 3     sacubitril-valsartan (ENTRESTO) 49-51 MG per tablet Take 1 tablet by mouth 2 times daily 180 tablet 3       Labs:     Transferred Records on 06/10/2021   Component Date Value Ref Range Status     RETINOPATHY 06/10/2021 POSITIVE*  Final       Allergies:  No Known Allergies    Family History:   Family History   Problem Relation Age of Onset     Hypertension Mother      Diabetes Type 2  Father      Hypertension Father      No Known Problems Brother      No Known Problems Sister      No Known Problems Son      No Known Problems Daughter      No Known Problems Sister      No Known Problems Sister      No Known Problems Sister      No Known Problems Sister      No Known Problems Brother      No Known Problems Son      No Known Problems Daughter      Myocardial Infarction No family hx of      Cerebrovascular Disease No family hx of      Coronary Artery Disease Early Onset No family hx of      Colon Cancer No family hx of      Prostate Cancer No family hx of        Psychosocial history:  reports that he quit smoking about 4 years ago. His smoking use included cigarettes. He has a 15.00 pack-year smoking history. He has never used smokeless tobacco. He reports that he does not drink alcohol or use drugs.    Review of systems: negative for, palpitations, exertional chest pain or pressure, paroxysmal nocturnal dyspnea, dyspnea on exertion, orthopnea, lower extremity edema, syncope or near-syncope and claudication    In addition,   General: No change in weight, sleep or appetite.  Normal energy.  No fever or chills  Eyes: Negative for vision changes or eye problems  ENT: No problems  with ears, nose or throat.  No difficulty swallowing.  Resp: No coughing, wheezing or shortness of breath  GI: No nausea, vomiting,  heartburn, abdominal pain, diarrhea, constipation or change in bowel habits  : No urinary frequency or dysuria, bladder or kidney problems  Musculoskeletal: No significant muscle or joint pains  Neurologic: No headaches, numbness, tingling, weakness, problems with balance or coordination  Psychiatric: No problems with anxiety, depression or mental health  Heme/immune/allergy: No history of bleeding or clotting problems or anemia.    Endocrine: Diabetes HbA1C < 7  Skin: No rashes,worrisome lesions or skin problems  Vascular:  No claudication, lifestyle limiting or otherwise; no ischemic rest pain; no non-healing ulcers. No weakness, No loss of sensation        Physical examination  Vitals: BP (!) 141/84 (BP Location: Right arm, Patient Position: Chair, Cuff Size: Adult Regular)   Pulse 57   Wt 59.2 kg (130 lb 9.6 oz)   SpO2 100%   BMI 23.89 kg/m    BMI= Body mass index is 23.89 kg/m .    In general, the patient is a pleasant male in no apparent distress.    HEENT: Normiocephalic and atraumatic.  PERRLA.  EOMI.  Sclerae white, not injected.  Nares clear.  Pharynx without erythema or exudate.  Dentition intact.    Neck: No adenopathy.  No thyromegaly. Carotids +2/2 bilaterally without bruits.  No jugular venous distension.   Heart:  The PMI is in the 5th ICS in the midclavicular line. There is no heave. Regular rate and rhythm. Normal S1, S2 splits physiologically. No murmur, rub, click, or gallop.    Lungs: Clear to asculation.  No ronchi, wheezes, rales.  No dullness to percussion.   Abdomen: Soft, nontender, nondistended. No organomegaly. No AAA.  No bruits.   Extremities: No clubbing, cyanosis, or edema. The pulses were intact bilaterally.   Neurological: The neurological examination reveal a patient who was oriented to person, place, and time.  The remainder of the examination  was nonfocal.    Cardiac tests include:    1/2021 - echo shows EF 15-20%, mild mitral regurgitation, regional wall motion defects, moderate pulmonary HTN    Assessment and Plan    1. Ischemic cardiomyopathy - on ASA lipitor, beta blocker, entresto  - ICD in place  - follow up in [facility]  2. CAD - as above  3. DM - on metformin and glipizide  4. HTN - usually under control at home  - will observe  5. Hyperlipidemia - on statin  6. Mitral clip - stable     The patient is to return  in 6 months with SCOTTIE. The patient understood the treatment plan as outlined above.  Patient speaks Luxembourgish. Interpretor present.       Zen Ojeda MD

## 2021-07-26 NOTE — PLAN OF CARE
Problem: Goal Outcome Summary  Goal: Goal Outcome Summary  1) pt will be hemodynamically stable  2) pt will tolerate weaning of IABP  3) pain will be adequately controlled     Outcome: No Change  DX:Cardiogenic shock (Admitted 3/27/17)     D/I: 50 y/o male admitted with cardiogenic shock post MI requiring PCI x 7 stents and IABP. Patient had bedside SAMEERA, which resulted as LVEF 40-45% with mitral valve insufficiency. Milrinone gtt remains at flat rate of 0.375 mcg/kg/min for inotropic support. GENE CI 2.3-2.5. CVP 11 & 12. SV 32 x 2. SVR 5099-0166. Subclavian IABP at 1:3. Cardiac rhythm ST with rates in upper 120's.  Crackles auscultated throughout lung fields. Breaths diminished, shallow, and patient expresses ongoing dyspnea. TF at goal rate of 50 ml/hr. BM x 1 with incontinence. Sacral pain persists from pressure ulcer; attempt at sitting in chair unsuccessful r/t discomfort. Pulsate mattress ordered and placed.      A: Patient appears in need of PT/OT to regain lost strength. Unknown reason for need of multi-source cardiac support considering EF >40%? Patient's status remains unclear.      P: Monitor cardiac status. Encourage pulmonary toilet as respiratory insufficiency will limited progress in all facets of care. Continue plan of care as ordered.     Bryant TIRADON, RN, CCRN  4E           RN NOTES



PT RETURNED TO UNIT AT 1200 VIA HIS BED ACCOMPANIED BY RECOVERY RN ZENAIDA S/P PERMCATH 
PLACEMENT ON RIGHT CHEST WALL BY DR WILKINS. DRESSING IN PLACE TO SITE WITH NO ACTIVE 
BLEEDING NOTED. PT IS LETHARGIC BUT FOLLOWS COMMANDS. RIGHT FEMORAL HD CATH REMOVED FROM 
SURGERY, DRESSING D/C/I, NO ACTIVE BLEEDING NOTED, WILL MONITOR SITE Q 15MINS FOR BLEEDING 
X2 HRS.

## 2021-08-04 ENCOUNTER — OFFICE VISIT (OUTPATIENT)
Dept: INTERNAL MEDICINE | Facility: CLINIC | Age: 56
End: 2021-08-04
Payer: COMMERCIAL

## 2021-08-04 VITALS
RESPIRATION RATE: 16 BRPM | HEIGHT: 62 IN | BODY MASS INDEX: 25.71 KG/M2 | HEART RATE: 73 BPM | DIASTOLIC BLOOD PRESSURE: 88 MMHG | SYSTOLIC BLOOD PRESSURE: 150 MMHG | WEIGHT: 139.7 LBS | OXYGEN SATURATION: 100 % | TEMPERATURE: 98.2 F

## 2021-08-04 DIAGNOSIS — Z79.4 TYPE 2 DIABETES MELLITUS WITH OTHER CIRCULATORY COMPLICATION, WITH LONG-TERM CURRENT USE OF INSULIN (H): ICD-10-CM

## 2021-08-04 DIAGNOSIS — Z86.73 HISTORY OF STROKE: ICD-10-CM

## 2021-08-04 DIAGNOSIS — I10 BENIGN ESSENTIAL HYPERTENSION: ICD-10-CM

## 2021-08-04 DIAGNOSIS — I25.10 CORONARY ARTERY DISEASE INVOLVING NATIVE CORONARY ARTERY OF NATIVE HEART WITHOUT ANGINA PECTORIS: ICD-10-CM

## 2021-08-04 DIAGNOSIS — I25.5 ISCHEMIC CARDIOMYOPATHY: ICD-10-CM

## 2021-08-04 DIAGNOSIS — Z79.01 LONG TERM CURRENT USE OF ANTICOAGULANT THERAPY: ICD-10-CM

## 2021-08-04 DIAGNOSIS — Z01.818 PREOP GENERAL PHYSICAL EXAM: Primary | ICD-10-CM

## 2021-08-04 DIAGNOSIS — I50.22 CHRONIC SYSTOLIC CONGESTIVE HEART FAILURE (H): ICD-10-CM

## 2021-08-04 DIAGNOSIS — H25.89 OTHER AGE-RELATED CATARACT, UNSPECIFIED LATERALITY: ICD-10-CM

## 2021-08-04 DIAGNOSIS — E11.59 TYPE 2 DIABETES MELLITUS WITH OTHER CIRCULATORY COMPLICATION, WITH LONG-TERM CURRENT USE OF INSULIN (H): ICD-10-CM

## 2021-08-04 DIAGNOSIS — Z95.810 ICD (IMPLANTABLE CARDIOVERTER-DEFIBRILLATOR) IN PLACE: ICD-10-CM

## 2021-08-04 PROCEDURE — 99214 OFFICE O/P EST MOD 30 MIN: CPT | Performed by: PHYSICIAN ASSISTANT

## 2021-08-04 ASSESSMENT — MIFFLIN-ST. JEOR: SCORE: 1352.93

## 2021-08-04 NOTE — PATIENT INSTRUCTIONS
Do not take glipizide the morning of your procedure  DO not take metformin the morning of your procedure   Do not take Bumex morning of your procedure    Other medications with water the day of surgery          Preparing for Your Surgery  Getting started  A nurse will call you to review your health history and instructions. They will give you an arrival time based on your scheduled surgery time.  Please be ready to share the following:    Your doctor's clinic name and phone number    Your medical, surgical and anesthesia history    A list of allergies and sensitivities    A list of medicines, including herbal treatments and over-the-counter drugs    Whether the patient has a legal guardian (ask how to send us the papers in advance)  If you have a child who's having surgery, please ask for a copy of Preparing for Your Child's Surgery.    Preparing for surgery    Within 30 days of surgery: Have a pre-op exam (sometimes called an H&P, or History and Physical). This can be done at a clinic or pre-operative center.  ? If you're having a , you may not need this exam. Talk to your care team    At your pre-op exam, talk to your care team about all medicines you take. If you need to stop any medicines before surgery, ask when to start taking them again.  ? We do this for your safety. Many medicines can make you bleed too much during surgery. Some change how well surgery (anesthesia) drugs work.    Call your insurance company to let them know you're having surgery. (If you don't have insurance, call [phone].)    Call your clinic if there's any change in your health. This includes signs of a cold or flu (sore throat, runny nose, cough, rash, fever). It also includes a scrape or scratch near the surgery site.    If you have questions on the day of surgery, call your hospital or surgery center.  Eating and drinking guidelines  For your safety: Unless your surgeon tells you otherwise, follow the guidelines  below.    Eat and drink as usual until 8 hours before surgery. After that, no food or milk.    Drink clear liquids until 2 hours before surgery. These are liquids you can see through, like water, Gatorade and Propel Water. You may also have black coffee and tea (no cream or milk).    Nothing by mouth within 2 hours of surgery. This includes gum, candy and breath mints.    If you drink, stop drinking alcohol the night before surgery.    If your care team tells you to take medicine on the morning of surgery, it's okay to take it with a sip of water.  Preventing infection    Shower or bathe the night before and morning of your surgery. Follow the instructions your clinic gave you. (If no instructions, use regular soap.)    Don't shave or clip hair near your surgery site. We'll remove the hair if needed.    Don't smoke or vape the morning of surgery. You may chew nicotine gum up to 2 hours before surgery. A nicotine patch is okay.  ? Note: Some surgeries require you to completely quit smoking and nicotine. Check with your surgeon.    Your care team will make every effort to keep you safe from infection. We will:  ? Clean our hands often with soap and water (or an alcohol-based hand rub).  ? Clean the skin at your surgery site with a special soap that kills germs.  ? Give you a special gown to keep you warm. (Cold raises the risk of infection.)  ? Wear special hair covers, masks, gowns and gloves during surgery.  ? Give antibiotic medicine, if prescribed. Not all surgeries need antibiotics.  What to bring on the day of surgery    Photo ID and insurance card    Copy of your health care directive, if you have one    Glasses and hearing aides (bring cases)  ? You can't wear contacts during surgery    Inhaler and eye drops, if you use them (tell us about these when you arrive)    CPAP machine or breathing device, if you use them    A few personal items, if spending the night    If you have . . .  ? A pacemaker or ICD  (cardiac defibrillator): Bring the ID card.  ? An implanted stimulator: Bring the remote control.  ? A legal guardian: Bring a copy of the certified (court-stamped) guardianship papers.  Please remove any jewelry, including body piercings. Leave jewelry and other valuables at home.  If you're going home the day of surgery  Important: If you don't follow the rules below, we must cancel your surgery.     Arrange for someone to drive you home after surgery. You may not drive, take a taxi or take public transportation by yourself (unless you'll have local anesthesia only).    Arrange for a responsible adult to stay with you overnight. If you don't, we may keep you in the hospital overnight, and you may need to pay the costs yourself.  Questions?   If you have any questions for your care team, list them here: _________________________________________________________________________________________________________________________________________________________________________________________________________________________________________________________________________________________________________________________  For informational purposes only. Not to replace the advice of your health care provider. Copyright   2003, 2019 [facility]. All rights reserved. Clinically reviewed by Elle Babin MD. [employer] [id number] - REV 4/20.

## 2021-08-04 NOTE — PROGRESS NOTES
[address] [address]  Phone: [phone]  Primary Provider: Shanna Church  Pre-op Performing Provider:    ARNOLDO GARSIA,       PREOPERATIVE EVALUATION:  Today's date: 8/4/2021    Luke Henao is a 56 year old male who presents for a preoperative evaluation.    Surgical Information:  Surgery/Procedure: Cataract- right eye  Surgery Location: [facility]  Surgeon: Dr Barrios  Surgery Date: 08/12/2021  Time of Surgery: 330pm  Where patient plans to recover: At home with family  Fax number for surgical facility: [phone]    Type of Anesthesia Anticipated: Local with MAC    Assessment & Plan     The proposed surgical procedure is considered LOW risk.    Preop general physical exam      Other age-related cataract, unspecified laterality      Type 2 diabetes mellitus with other circulatory complication, with long-term current use of insulin (H)      Ischemic cardiomyopathy      Chronic systolic congestive heart failure (H)      Coronary artery disease involving native coronary artery of native heart without angina pectoris      Benign essential hypertension      ICD (implantable cardioverter-defibrillator) in place- Medtronic, single chamber- NOT dependent      Long-term (current) use of anticoagulants [Z79.01]      History of stroke         Implanted Device:   - Type of device: ICD Patient advised to bring device information on day of surgery.      Risks and Recommendations:  The patient has the following additional risks and recommendations for perioperative complications:  Cardiovascular:  Stable no symptoms   Diabetes:  - Patient is not on insulin therapy: regular NPO guidelines can be followed.     Medication Instructions:  Patient is to take all scheduled medications on the day of surgery EXCEPT for modifications listed below:   - ACE/ARB: May be continued on the day of surgery.    - Beta Blockers:  Continue taking on the day of surgery.   - Diuretics: HOLD on the day of surgery.   - Statins: Continue taking on the day of surgery.    - metformin: HOLD day of surgery.   - sulfonylurea (e.g. glyburide, glipizide): HOLD day of surgery    RECOMMENDATION:  APPROVAL GIVEN to proceed with proposed procedure, without further diagnostic evaluation.                      Subjective     HPI related to upcoming procedure: right eye cataract      Preop Questions 8/4/2021   1. Have you ever had a heart attack or stroke? YES - stroke and heart attack hx 2017  Stable for several years now    2. Have you ever had surgery on your heart or blood vessels, such as a stent placement, a coronary artery bypass, or surgery on an artery in your head, neck, heart, or legs? YES - ICD and valve replacement   3. Do you have chest pain with activity? No   4. Do you have a history of  heart failure? YES - related to the above, stable and routinely seeing cardiology    5. Do you currently have a cold, bronchitis or symptoms of other infection? No   6. Do you have a cough, shortness of breath, or wheezing? No   7. Do you or anyone in your family have previous history of blood clots? No   8. Do you or does anyone in your family have a serious bleeding problem such as prolonged bleeding following surgeries or cuts? No   9. Have you ever had problems with anemia or been told to take iron pills? No   10. Have you had any abnormal blood loss such as black, tarry or bloody stools? No   11. Have you ever had a blood transfusion? No   12. Are you willing to have a blood transfusion if it is medically needed before, during, or after your surgery? Yes   13. Have you or any of your relatives ever had problems with anesthesia? No   14. Do you have sleep apnea, excessive snoring or daytime drowsiness? No   15. Do you have any artifical heart valves or other implanted medical devices like a pacemaker, defibrillator, or continuous glucose monitor? YES - ICD    15a. What type of device do you have? see epic   15b. Name of the clinic that manages your device[facility]   16. Do you have artificial joints? No   17. Are you allergic to latex? No     Health Care Directive:  Patient has a Health Care Directive on file      Preoperative Review of :   reviewed - no record of controlled substances prescribed.      Status of Chronic Conditions:  CAD - Patient has a longstanding history of moderate-severe CAD. Patient denies recent chest pain or NTG use, denies exercise induced dyspnea or PND. Last Stress test , EKG .     CHF - Patient has a longstanding history of moderate-severe CHF. Exacerbating conditions include hypertension and valvular disease. Currently the patient's condition is same. Current treatment regimen includes Angiotensin 2 receptor blocker, beta blocker, ASA and diuretic. The patient denies chest pain, edema, orthopnea, SOB or recent weight gain. Last Echocardiogram , EKG .     HYPERLIPIDEMIA - Patient has a long history of significant Hyperlipidemia requiring medication for treatment with recent good control. Patient reports no problems or side effects with the medication.     HYPERTENSION - Patient has longstanding history of HTN , currently denies any symptoms referable to elevated blood pressure. Specifically denies chest pain, palpitations, dyspnea, orthopnea, PND or peripheral edema. Blood pressure readings have been in normal range. Current medication regimen is as listed below. Patient denies any side effects of medication.       Review of Systems  CONSTITUTIONAL: NEGATIVE for fever, chills, change in weight  ENT/MOUTH: NEGATIVE for ear, mouth and throat problems  RESP: NEGATIVE for significant cough or SOB  CV: NEGATIVE for chest pain, palpitations or peripheral edema  GI: NEGATIVE for nausea, abdominal pain, heartburn, or change in bowel habits  HEME/ALLERGY/IMMUNE: NEGATIVE for bleeding problems  PSYCHIATRIC: NEGATIVE for changes in mood or  affect  ROS otherwise negative    Patient Active Problem List    Diagnosis Date Noted     Type 2 diabetes mellitus (H)      Priority: Medium     Benign essential hypertension      Priority: Medium     History of mitral valve repair with mitral clip 2017 03/04/2018     Priority: Medium     S/P coronary artery stent placement 2017 03/04/2018     Priority: Medium     ICD (implantable cardioverter-defibrillator) in place- Medtronic, single chamber- NOT dependent 10/28/2017     Priority: Medium     Post-operative state 10/27/2017     Priority: Medium     Long-term (current) use of anticoagulants [Z79.01] 10/03/2017     Priority: Medium     Chronic systolic congestive heart failure (H) 08/30/2017     Priority: Medium     Type 2 diabetes mellitus with circulatory disorder, with long-term current use of insulin (H) 06/06/2017     Priority: Medium     Mixed hyperlipidemia 06/06/2017     Priority: Medium     Ischemic cardiomyopathy 01/01/2017     Priority: Medium     EF 30-35%       CAD (coronary artery disease) 01/01/2017     Priority: Medium     RCA STEMI s/p balloon angioplasty and multiple Sofie to RCA, LCx, and LAD       History of stroke 2017     Priority: Medium     no residual deficits       History of deep venous thrombosis 2017     Priority: Medium     left internal jugular (line-associated); left common femoral; was on coumadin        Past Medical History:   Diagnosis Date     Benign essential hypertension      CAD (coronary artery disease) 2017    RCA STEMI s/p balloon angioplasty and multiple Sofie to RCA, LCx, and LAD     History of deep venous thrombosis 2017    left internal jugular (line-associated); left common femoral; was on coumadin     History of stroke 2017    no residual deficits     Hyperlipidemia      Ischemic cardiomyopathy 2017    EF 30-35%     Myocardial infarction (H)      Type 2 diabetes mellitus (H)      Past Surgical History:   Procedure Laterality Date     C INSERT ELECTRD LEADS/REPOSTION   10/27/2017          COLONOSCOPY N/A 4/17/2017    Procedure: COLONOSCOPY;  Surgeon: Rashaad Bundy MD;  Location: [facility]     INSERT INTRAAORTIC BALLOON PUMP Right 4/19/2017    Procedure: INSERT INTRAAORTIC BALLOON PUMP;  Right Subclavian Intra Aortic Balloon Pump Insertion using Maquet 40cc Ballon Catheter, Implentation of 8mm Gelweave Woven Vascular Prosthesis, Removal of Left Femoral Ballon Pump Catheter, Flouroscopy;  Surgeon: Keshav Leung MD;  Location: [facility] OR     PERCUTANEOUS MITRAL VALVE REPAIR N/A 5/1/2017    Procedure: PERCUTANEOUS MITRAL VALVE REPAIR ANESTHESIA;  Mitraclip Procedure Possible Cardiopulmonary Bypass ;  Surgeon: Ron Cortez MD;  Location: [facility] OR     SUBCLAVIAN AORTIC VALVE IMPLANT N/A 5/8/2017    Procedure: SUBCLAVIAN AORTIC VALVE IMPLANT;  Right Subclavian Graft Removal ;  Surgeon: Keshav Leung MD;  Location: [facility] OR     Current Outpatient Medications   Medication Sig Dispense Refill     aspirin ([employer] ASPIRIN ADULT LOW DOSE) 81 MG chewable tablet Take 1 tablet (81 mg) by mouth daily 90 tablet 2     atorvastatin (LIPITOR) 40 MG tablet Take 1 tablet (40 mg) by mouth daily 90 tablet 3     bumetanide (BUMEX) 0.5 MG tablet Take 1 tablet (0.5 mg) by mouth daily 90 tablet 3     cetirizine (ZYRTEC) 10 MG tablet TAKE 1 TABLET (10 MG) BY MOUTH DAILY 90 tablet 1     co-enzyme Q-10 100 MG CAPS capsule Take by mouth daily       fluticasone (FLONASE) 50 MCG/ACT nasal spray SPRAY 2 SPRAYS INTO BOTH NOSTRILS DAILY 48 mL 3     glipiZIDE (GLUCOTROL) 5 MG tablet Take 1 tablet (5 mg) by mouth 2 times daily (before meals) - further refills must be obtained from PCP or endocrinologist 60 tablet 0     metFORMIN (GLUCOPHAGE-XR) 500 MG 24 hr tablet TAKE 2 TABLETS (1,000 MG) BY MOUTH DAILY (WITH DINNER) 180 tablet 3     metoprolol succinate ER (TOPROL-XL) 200 MG 24 hr tablet Take 1 tablet (200 mg) by mouth daily 90 tablet 3     olopatadine (PATANOL) 0.1 % ophthalmic solution Place 1  "drop into both eyes 2 times daily 15 mL 1     ONETOUCH ULTRA test strip USE TO TEST BLOOD SUGAR 3 TIMES DAILY OR AS DIRECTED. 100 strip 3     sacubitril-valsartan (ENTRESTO) 49-51 MG per tablet Take 1 tablet by mouth 2 times daily 180 tablet 3       No Known Allergies     Social History     Tobacco Use     Smoking status: Former Smoker     Packs/day: 0.50     Years: 30.00     Pack years: 15.00     Types: Cigarettes     Quit date: 2017     Years since quittin.5     Smokeless tobacco: Never Used   Substance Use Topics     Alcohol use: No       History   Drug Use No         Objective     BP (!) 150/88   Pulse 73   Temp 98.2  F (36.8  C) (Tympanic)   Resp 16   Ht 1.575 m (5' 2\")   Wt 63.4 kg (139 lb 11.2 oz)   SpO2 100%   BMI 25.55 kg/m      Physical Exam  GENERAL APPEARANCE: healthy, alert and no distress  HENT: ear canals and TM's normal and nose and mouth without ulcers or lesions  RESP: lungs clear to auscultation - no rales, rhonchi or wheezes  CV: regular rate and rhythm, normal S1 S2, no S3 or S4 and no murmur, click or rub   ABDOMEN: soft, nontender, no HSM or masses and bowel sounds normal  MS: extremities normal- no gross deformities noted  SKIN: no suspicious lesions or rashes  NEURO: Normal strength and tone, sensory exam grossly normal, mentation intact and speech normal  PSYCH: mentation appears normal and affect normal/bright    Recent Labs   Lab Test 21  1322 21  0649 10/12/20  1128   HGB 15.5  --  15.2     --  224    139 138   POTASSIUM 4.9 5.0 5.2   CR 1.18 1.17 1.10   A1C 6.8*  --  6.4*        Diagnostics:  No labs were ordered during this visit.   No EKG required for low risk surgery (cataract, skin procedure, breast biopsy, etc).    Revised Cardiac Risk Index (RCRI):  The patient has the following serious cardiovascular risks for perioperative complications:   - Coronary Artery Disease (MI, positive stress test, angina, Qs on EKG) = 1 point   - Congestive " Heart Failure (pulmonary edema, PND, s3 poncho, CXR with pulmonary congestion, basilar rales) = 1 point   - Cerebrovascular Disease (TIA or CVA) = 1 point     RCRI Interpretation: 3 points: Class IV (high risk - >11% complication rate)    Estimated Functional Capacity: Performs 4 METS exercise without symptoms (e.g., light housework, stairs, 4 mph walk, 7 mph bike, slow step dance)           Signed Electronically by: Flavia Haines PA-C  Copy of this evaluation report is provided to requesting physician.

## 2021-08-12 ENCOUNTER — ANCILLARY PROCEDURE (OUTPATIENT)
Dept: CARDIOLOGY | Facility: CLINIC | Age: 56
End: 2021-08-12
Attending: INTERNAL MEDICINE
Payer: COMMERCIAL

## 2021-08-12 DIAGNOSIS — I42.9 CARDIOMYOPATHY (H): ICD-10-CM

## 2021-08-12 PROCEDURE — 93296 REM INTERROG EVL PM/IDS: CPT

## 2021-08-12 PROCEDURE — 93295 DEV INTERROG REMOTE 1/2/MLT: CPT | Performed by: INTERNAL MEDICINE

## 2021-08-26 ENCOUNTER — TRANSFERRED RECORDS (OUTPATIENT)
Dept: HEALTH INFORMATION MANAGEMENT | Facility: CLINIC | Age: 56
End: 2021-08-26

## 2021-08-26 LAB — RETINOPATHY: POSITIVE

## 2021-09-03 ENCOUNTER — TRANSFERRED RECORDS (OUTPATIENT)
Dept: HEALTH INFORMATION MANAGEMENT | Facility: CLINIC | Age: 56
End: 2021-09-03

## 2021-09-03 LAB — RETINOPATHY: POSITIVE

## 2021-09-17 LAB
MDC_IDC_LEAD_IMPLANT_DT: NORMAL
MDC_IDC_LEAD_LOCATION: NORMAL
MDC_IDC_LEAD_LOCATION_DETAIL_1: NORMAL
MDC_IDC_LEAD_MFG: NORMAL
MDC_IDC_LEAD_MODEL: NORMAL
MDC_IDC_LEAD_POLARITY_TYPE: NORMAL
MDC_IDC_LEAD_SERIAL: NORMAL
MDC_IDC_LEAD_SPECIAL_FUNCTION: NORMAL
MDC_IDC_MSMT_BATTERY_DTM: NORMAL
MDC_IDC_MSMT_BATTERY_REMAINING_LONGEVITY: 108 MO
MDC_IDC_MSMT_BATTERY_RRT_TRIGGER: 2.73
MDC_IDC_MSMT_BATTERY_STATUS: NORMAL
MDC_IDC_MSMT_BATTERY_VOLTAGE: 2.99 V
MDC_IDC_MSMT_CAP_CHARGE_DTM: NORMAL
MDC_IDC_MSMT_CAP_CHARGE_ENERGY: 18 J
MDC_IDC_MSMT_CAP_CHARGE_TIME: 3.59
MDC_IDC_MSMT_CAP_CHARGE_TYPE: NORMAL
MDC_IDC_MSMT_LEADCHNL_RV_IMPEDANCE_VALUE: 266 OHM
MDC_IDC_MSMT_LEADCHNL_RV_IMPEDANCE_VALUE: 342 OHM
MDC_IDC_MSMT_LEADCHNL_RV_PACING_THRESHOLD_AMPLITUDE: 0.5 V
MDC_IDC_MSMT_LEADCHNL_RV_PACING_THRESHOLD_PULSEWIDTH: 0.4 MS
MDC_IDC_MSMT_LEADCHNL_RV_SENSING_INTR_AMPL: 7.75 MV
MDC_IDC_MSMT_LEADCHNL_RV_SENSING_INTR_AMPL: 7.75 MV
MDC_IDC_PG_IMPLANT_DTM: NORMAL
MDC_IDC_PG_MFG: NORMAL
MDC_IDC_PG_MODEL: NORMAL
MDC_IDC_PG_SERIAL: NORMAL
MDC_IDC_PG_TYPE: NORMAL
MDC_IDC_SESS_CLINIC_NAME: NORMAL
MDC_IDC_SESS_DTM: NORMAL
MDC_IDC_SESS_TYPE: NORMAL
MDC_IDC_SET_BRADY_HYSTRATE: NORMAL
MDC_IDC_SET_BRADY_LOWRATE: 40 {BEATS}/MIN
MDC_IDC_SET_BRADY_MODE: NORMAL
MDC_IDC_SET_LEADCHNL_RV_PACING_AMPLITUDE: 2 V
MDC_IDC_SET_LEADCHNL_RV_PACING_ANODE_ELECTRODE_1: NORMAL
MDC_IDC_SET_LEADCHNL_RV_PACING_ANODE_LOCATION_1: NORMAL
MDC_IDC_SET_LEADCHNL_RV_PACING_CAPTURE_MODE: NORMAL
MDC_IDC_SET_LEADCHNL_RV_PACING_CATHODE_ELECTRODE_1: NORMAL
MDC_IDC_SET_LEADCHNL_RV_PACING_CATHODE_LOCATION_1: NORMAL
MDC_IDC_SET_LEADCHNL_RV_PACING_POLARITY: NORMAL
MDC_IDC_SET_LEADCHNL_RV_PACING_PULSEWIDTH: 0.4 MS
MDC_IDC_SET_LEADCHNL_RV_SENSING_ANODE_ELECTRODE_1: NORMAL
MDC_IDC_SET_LEADCHNL_RV_SENSING_ANODE_LOCATION_1: NORMAL
MDC_IDC_SET_LEADCHNL_RV_SENSING_CATHODE_ELECTRODE_1: NORMAL
MDC_IDC_SET_LEADCHNL_RV_SENSING_CATHODE_LOCATION_1: NORMAL
MDC_IDC_SET_LEADCHNL_RV_SENSING_POLARITY: NORMAL
MDC_IDC_SET_LEADCHNL_RV_SENSING_SENSITIVITY: 0.3 MV
MDC_IDC_SET_ZONE_DETECTION_BEATS_DENOMINATOR: 40 {BEATS}
MDC_IDC_SET_ZONE_DETECTION_BEATS_NUMERATOR: 30 {BEATS}
MDC_IDC_SET_ZONE_DETECTION_INTERVAL: 300 MS
MDC_IDC_SET_ZONE_DETECTION_INTERVAL: 360 MS
MDC_IDC_SET_ZONE_DETECTION_INTERVAL: 390 MS
MDC_IDC_SET_ZONE_DETECTION_INTERVAL: NORMAL
MDC_IDC_SET_ZONE_TYPE: NORMAL
MDC_IDC_STAT_AT_BURDEN_PERCENT: 0 %
MDC_IDC_STAT_AT_DTM_END: NORMAL
MDC_IDC_STAT_AT_DTM_START: NORMAL
MDC_IDC_STAT_BRADY_DTM_END: NORMAL
MDC_IDC_STAT_BRADY_DTM_START: NORMAL
MDC_IDC_STAT_BRADY_RV_PERCENT_PACED: 0.03 %
MDC_IDC_STAT_EPISODE_RECENT_COUNT: 0
MDC_IDC_STAT_EPISODE_RECENT_COUNT_DTM_END: NORMAL
MDC_IDC_STAT_EPISODE_RECENT_COUNT_DTM_START: NORMAL
MDC_IDC_STAT_EPISODE_TOTAL_COUNT: 0
MDC_IDC_STAT_EPISODE_TOTAL_COUNT_DTM_END: NORMAL
MDC_IDC_STAT_EPISODE_TOTAL_COUNT_DTM_START: NORMAL
MDC_IDC_STAT_EPISODE_TYPE: NORMAL
MDC_IDC_STAT_TACHYTHERAPY_ATP_DELIVERED_RECENT: 0
MDC_IDC_STAT_TACHYTHERAPY_ATP_DELIVERED_TOTAL: 0
MDC_IDC_STAT_TACHYTHERAPY_RECENT_DTM_END: NORMAL
MDC_IDC_STAT_TACHYTHERAPY_RECENT_DTM_START: NORMAL
MDC_IDC_STAT_TACHYTHERAPY_SHOCKS_ABORTED_RECENT: 0
MDC_IDC_STAT_TACHYTHERAPY_SHOCKS_ABORTED_TOTAL: 0
MDC_IDC_STAT_TACHYTHERAPY_SHOCKS_DELIVERED_RECENT: 0
MDC_IDC_STAT_TACHYTHERAPY_SHOCKS_DELIVERED_TOTAL: 0
MDC_IDC_STAT_TACHYTHERAPY_TOTAL_DTM_END: NORMAL
MDC_IDC_STAT_TACHYTHERAPY_TOTAL_DTM_START: NORMAL

## 2021-09-22 ENCOUNTER — APPOINTMENT (OUTPATIENT)
Dept: INTERPRETER SERVICES | Facility: CLINIC | Age: 56
End: 2021-09-22
Payer: COMMERCIAL

## 2021-09-22 ENCOUNTER — OFFICE VISIT (OUTPATIENT)
Dept: INTERNAL MEDICINE | Facility: CLINIC | Age: 56
End: 2021-09-22
Payer: COMMERCIAL

## 2021-09-22 VITALS
RESPIRATION RATE: 16 BRPM | BODY MASS INDEX: 26.22 KG/M2 | HEART RATE: 73 BPM | HEIGHT: 62 IN | OXYGEN SATURATION: 99 % | TEMPERATURE: 98.2 F | SYSTOLIC BLOOD PRESSURE: 142 MMHG | DIASTOLIC BLOOD PRESSURE: 88 MMHG | WEIGHT: 142.5 LBS

## 2021-09-22 DIAGNOSIS — Z01.818 PREOP GENERAL PHYSICAL EXAM: Primary | ICD-10-CM

## 2021-09-22 DIAGNOSIS — E11.59 TYPE 2 DIABETES MELLITUS WITH OTHER CIRCULATORY COMPLICATION, WITHOUT LONG-TERM CURRENT USE OF INSULIN (H): ICD-10-CM

## 2021-09-22 DIAGNOSIS — I50.22 CHRONIC SYSTOLIC CONGESTIVE HEART FAILURE (H): ICD-10-CM

## 2021-09-22 DIAGNOSIS — I10 BENIGN ESSENTIAL HYPERTENSION: ICD-10-CM

## 2021-09-22 DIAGNOSIS — H25.9 SENILE CATARACT, UNSPECIFIED AGE-RELATED CATARACT TYPE, UNSPECIFIED LATERALITY: ICD-10-CM

## 2021-09-22 DIAGNOSIS — Z95.810 ICD (IMPLANTABLE CARDIOVERTER-DEFIBRILLATOR) IN PLACE: ICD-10-CM

## 2021-09-22 DIAGNOSIS — I25.10 CORONARY ARTERY DISEASE INVOLVING NATIVE CORONARY ARTERY OF NATIVE HEART WITHOUT ANGINA PECTORIS: ICD-10-CM

## 2021-09-22 DIAGNOSIS — Z95.5 S/P CORONARY ARTERY STENT PLACEMENT: ICD-10-CM

## 2021-09-22 DIAGNOSIS — Z79.01 LONG TERM CURRENT USE OF ANTICOAGULANT THERAPY: ICD-10-CM

## 2021-09-22 PROBLEM — Z79.4 TYPE 2 DIABETES MELLITUS WITH CIRCULATORY DISORDER, WITH LONG-TERM CURRENT USE OF INSULIN (H): Status: RESOLVED | Noted: 2017-06-06 | Resolved: 2021-09-22

## 2021-09-22 PROCEDURE — 99214 OFFICE O/P EST MOD 30 MIN: CPT | Performed by: PHYSICIAN ASSISTANT

## 2021-09-22 ASSESSMENT — MIFFLIN-ST. JEOR: SCORE: 1365.63

## 2021-09-22 NOTE — PROGRESS NOTES
[address] [address]  Phone: [phone]  Primary Provider: Shanna Church  Pre-op Performing Provider:    ARNOLDO GARSIA,       PREOPERATIVE EVALUATION:  Today's date: 9/22/2021    Luke Henao is a 56 year old male who presents for a preoperative evaluation.    Surgical Information:  Surgery/Procedure: Cataract, L Eye  Surgery Location: [facility]  Surgeon: Dr. Lofton  Surgery Date: 09/27/2021  Time of Surgery: 12:00pm  Where patient plans to recover: At home with family  Fax number for surgical facility: [phone]    Type of Anesthesia Anticipated: Local with MAC    Assessment & Plan     The proposed surgical procedure is considered LOW risk.    Preop general physical exam      Senile cataract, unspecified age-related cataract type, unspecified laterality      Type 2 diabetes mellitus with other circulatory complication, without long-term current use of insulin (H)      Chronic systolic congestive heart failure (H)      Coronary artery disease involving native coronary artery of native heart without angina pectoris      S/P coronary artery stent placement 2017      Long-term (current) use of anticoagulants [Z79.01]      ICD (implantable cardioverter-defibrillator) in place- Medtronic, single chamber- NOT dependent      Benign essential hypertension         Implanted Device:   - Type of device: ICD Patient advised to bring device information on day of surgery.   - For full details on implanted device, refer to device management note in Epic from date: see chart      Risks and Recommendations:  The patient has the following additional risks and recommendations for perioperative complications:  Diabetes:  - Patient is not on insulin therapy: regular NPO guidelines can be followed.     Medication Instructions:  Patient is to take all scheduled medications on the day of surgery EXCEPT for modifications  listed below:   - Bleeding risk is low for this procedure (e.g. dental, skin, cataract).   - ACE/ARB: May be continued on the day of surgery.    - Beta Blockers: Continue taking on the day of surgery.   - Calcium Channel Blockers: May be continued on the day of surgery.   - Diuretics: HOLD on the day of surgery.   - Statins: Continue taking on the day of surgery.    - metformin: HOLD day of surgery.   - sulfonylurea (e.g. glyburide, glipizide): HOLD day of surgery    RECOMMENDATION:  APPROVAL GIVEN to proceed with proposed procedure, without further diagnostic evaluation.                      Subjective     HPI related to upcoming procedure: left eye cataract    Preop Questions 9/22/2021   1. Have you ever had a heart attack or stroke? YES - stroke and heart attack hx 2017  Stable for several years now    2. Have you ever had surgery on your heart or blood vessels, such as a stent placement, a coronary artery bypass, or surgery on an artery in your head, neck, heart, or legs? YES -  ICD and valve replacement   3. Do you have chest pain with activity? No   4. Do you have a history of  heart failure? YES - related to the above, stable and routinely seeing cardiology    5. Do you currently have a cold, bronchitis or symptoms of other infection? No   6. Do you have a cough, shortness of breath, or wheezing? No   7. Do you or anyone in your family have previous history of blood clots? No   8. Do you or does anyone in your family have a serious bleeding problem such as prolonged bleeding following surgeries or cuts? No   9. Have you ever had problems with anemia or been told to take iron pills? No   10. Have you had any abnormal blood loss such as black, tarry or bloody stools? No   11. Have you ever had a blood transfusion? No   12. Are you willing to have a blood transfusion if it is medically needed before, during, or after your surgery? Yes   13. Have you or any of your relatives ever had problems with anesthesia? No    14. Do you have sleep apnea, excessive snoring or daytime drowsiness? No   15. Do you have any artifical heart valves or other implanted medical devices like a pacemaker, defibrillator, or continuous glucose monitor? YES -    15a. What type of device do you have? defibrillator   15b. Name of the clinic that manages your device:  Dr. Ojeda, [place],  Cardiology   16. Do you have artificial joints? No   17. Are you allergic to latex? No       Health Care Directive:  Patient has a Health Care Directive on file      Preoperative Review of :   reviewed - no record of controlled substances prescribed.      Status of Chronic Conditions:  CAD - Patient has a longstanding history of moderate-severe CAD. Patient denies recent chest pain or NTG use, denies exercise induced dyspnea or PND.      CHF - Patient has a longstanding history of moderate-severe CHF. Exacerbating conditions include hypertension. Currently the patient's condition is same. Current treatment regimen includes Angiotensin 2 receptor blocker, beta blocker, ASA and diuretic. The patient denies chest pain, edema, orthopnea, SOB or recent weight gain.      DIABETES - Patient has a longstanding history of DiabetesType Type II . Patient is being treated with diet and oral agents and denies significant side effects. Control has been good. Complicating factors include but are not limited to: hypertension, hyperlipidemia, CAD/PVD and CVA.     HYPERLIPIDEMIA - Patient has a long history of significant Hyperlipidemia requiring medication for treatment with recent good control. Patient reports no problems or side effects with the medication.     HYPERTENSION - Patient has longstanding history of HTN , currently denies any symptoms referable to elevated blood pressure. Specifically denies chest pain, palpitations, dyspnea, orthopnea, PND or peripheral edema. Blood pressure readings have been in normal range. Current medication regimen is as listed below.  Patient denies any side effects of medication.       Review of Systems  CONSTITUTIONAL: NEGATIVE for fever, chills, change in weight  INTEGUMENTARY/SKIN: NEGATIVE for worrisome rashes, moles or lesions  EYES: NEGATIVE for vision changes or irritation  ENT/MOUTH: NEGATIVE for ear, mouth and throat problems  RESP: NEGATIVE for significant cough or SOB  CV: NEGATIVE for chest pain, palpitations or peripheral edema  GI: NEGATIVE for nausea, abdominal pain, heartburn, or change in bowel habits  PSYCHIATRIC: NEGATIVE for changes in mood or affect  ROS otherwise negative    Patient Active Problem List    Diagnosis Date Noted     Type 2 diabetes mellitus (H)      Priority: Medium     Benign essential hypertension      Priority: Medium     History of mitral valve repair with mitral clip 2017 03/04/2018     Priority: Medium     S/P coronary artery stent placement 2017 03/04/2018     Priority: Medium     ICD (implantable cardioverter-defibrillator) in place- Medtronic, single chamber- NOT dependent 10/28/2017     Priority: Medium     Post-operative state 10/27/2017     Priority: Medium     Long-term (current) use of anticoagulants [Z79.01] 10/03/2017     Priority: Medium     Chronic systolic congestive heart failure (H) 08/30/2017     Priority: Medium     Mixed hyperlipidemia 06/06/2017     Priority: Medium     Ischemic cardiomyopathy 01/01/2017     Priority: Medium     EF 30-35%       CAD (coronary artery disease) 01/01/2017     Priority: Medium     RCA STEMI s/p balloon angioplasty and multiple Sofie to RCA, LCx, and LAD       History of stroke 2017     Priority: Medium     no residual deficits       History of deep venous thrombosis 2017     Priority: Medium     left internal jugular (line-associated); left common femoral; was on coumadin        Past Medical History:   Diagnosis Date     Benign essential hypertension      CAD (coronary artery disease) 2017    RCA STEMI s/p balloon angioplasty and multiple Sofie to RCA, LCx,  and LAD     History of deep venous thrombosis 2017    left internal jugular (line-associated); left common femoral; was on coumadin     History of stroke 2017    no residual deficits     Hyperlipidemia      Ischemic cardiomyopathy 2017    EF 30-35%     Myocardial infarction (H)      Type 2 diabetes mellitus (H)      Past Surgical History:   Procedure Laterality Date     C INSERT ELECTRD LEADS/REPOSTION  10/27/2017          COLONOSCOPY N/A 4/17/2017    Procedure: COLONOSCOPY;  Surgeon: Rashaad Bundy MD;  Location: [facility]     INSERT INTRAAORTIC BALLOON PUMP Right 4/19/2017    Procedure: INSERT INTRAAORTIC BALLOON PUMP;  Right Subclavian Intra Aortic Balloon Pump Insertion using Maquet 40cc Ballon Catheter, Implentation of 8mm Gelweave Woven Vascular Prosthesis, Removal of Left Femoral Ballon Pump Catheter, Flouroscopy;  Surgeon: Keshav Leung MD;  Location: [facility] OR     PERCUTANEOUS MITRAL VALVE REPAIR N/A 5/1/2017    Procedure: PERCUTANEOUS MITRAL VALVE REPAIR ANESTHESIA;  Mitraclip Procedure Possible Cardiopulmonary Bypass ;  Surgeon: Ron Cortez MD;  Location: [facility] OR     SUBCLAVIAN AORTIC VALVE IMPLANT N/A 5/8/2017    Procedure: SUBCLAVIAN AORTIC VALVE IMPLANT;  Right Subclavian Graft Removal ;  Surgeon: Keshav Leung MD;  Location: [facility] OR     Current Outpatient Medications   Medication Sig Dispense Refill     aspirin ([employer] ASPIRIN ADULT LOW DOSE) 81 MG chewable tablet Take 1 tablet (81 mg) by mouth daily 90 tablet 2     atorvastatin (LIPITOR) 40 MG tablet Take 1 tablet (40 mg) by mouth daily 90 tablet 3     bumetanide (BUMEX) 0.5 MG tablet Take 1 tablet (0.5 mg) by mouth daily 90 tablet 3     cetirizine (ZYRTEC) 10 MG tablet TAKE 1 TABLET (10 MG) BY MOUTH DAILY 90 tablet 1     co-enzyme Q-10 100 MG CAPS capsule Take by mouth daily       fluticasone (FLONASE) 50 MCG/ACT nasal spray SPRAY 2 SPRAYS INTO BOTH NOSTRILS DAILY 48 mL 3     glipiZIDE (GLUCOTROL) 5 MG tablet Take 1 tablet  "(5 mg) by mouth 2 times daily (before meals) - further refills must be obtained from PCP or endocrinologist 60 tablet 0     metFORMIN (GLUCOPHAGE-XR) 500 MG 24 hr tablet TAKE 2 TABLETS (1,000 MG) BY MOUTH DAILY (WITH DINNER) 180 tablet 3     metoprolol succinate ER (TOPROL-XL) 200 MG 24 hr tablet Take 1 tablet (200 mg) by mouth daily 90 tablet 3     ONETOUCH ULTRA test strip USE TO TEST BLOOD SUGAR 3 TIMES DAILY OR AS DIRECTED. 100 strip 3     sacubitril-valsartan (ENTRESTO) 49-51 MG per tablet Take 1 tablet by mouth 2 times daily 180 tablet 3     olopatadine (PATANOL) 0.1 % ophthalmic solution Place 1 drop into both eyes 2 times daily (Patient not taking: Reported on 2021) 15 mL 1       No Known Allergies     Social History     Tobacco Use     Smoking status: Former Smoker     Packs/day: 0.50     Years: 30.00     Pack years: 15.00     Types: Cigarettes     Quit date: 2017     Years since quittin.7     Smokeless tobacco: Never Used   Substance Use Topics     Alcohol use: No       History   Drug Use No         Objective     BP (!) 142/88   Pulse 73   Temp 98.2  F (36.8  C) (Temporal)   Resp 16   Ht 1.575 m (5' 2\")   Wt 64.6 kg (142 lb 8 oz)   SpO2 99%   BMI 26.06 kg/m      Physical Exam  GENERAL APPEARANCE: healthy, alert and no distress  EYES: Eyes grossly normal to inspection  HENT: ear canals and TM's normal and nose and mouth without ulcers or lesions  RESP: lungs clear to auscultation - no rales, rhonchi or wheezes  CV: regular rate and rhythm, normal S1 S2, no S3 or S4 and no murmur, click or rub   ABDOMEN: soft, nontender, no HSM or masses and bowel sounds normal  NEURO: Normal strength and tone, sensory exam grossly normal, mentation intact and speech normal  PSYCH: mentation appears normal and affect normal/bright    Recent Labs   Lab Test 21  1322 21  0649 20  0636 10/12/20  1128   HGB 15.5  --   --  15.2     --   --  224    139   < > 138   POTASSIUM 4.9 " 5.0   < > 5.2   CR 1.18 1.17   < > 1.10   A1C 6.8*  --   --  6.4*    < > = values in this interval not displayed.        Diagnostics:  No labs were ordered during this visit.   No EKG required for low risk surgery (cataract, skin procedure, breast biopsy, etc).    Revised Cardiac Risk Index (RCRI):  The patient has the following serious cardiovascular risks for perioperative complications:   - Coronary Artery Disease (MI, positive stress test, angina, Qs on EKG) = 1 point   - Congestive Heart Failure (pulmonary edema, PND, s3 poncho, CXR with pulmonary congestion, basilar rales) = 1 point   - Cerebrovascular Disease (TIA or CVA) = 1 point     RCRI Interpretation: 3 points: Class IV (high risk - >11% complication rate)    Estimated Functional Capacity: Performs 4 METS exercise without symptoms (e.g., light housework, stairs, 4 mph walk, 7 mph bike, slow step dance)           Signed Electronically by: Flavia Haines PA-C  Copy of this evaluation report is provided to requesting physician.

## 2021-09-22 NOTE — PATIENT INSTRUCTIONS
Do not take  metformin the morning of surgery  Do not take glipizide the morning of surgery  Do not take Bumex the morning of surgery - may take when you get home     All other medications with sip of water   Preparing for Your Surgery  Getting started  A nurse will call you to review your health history and instructions. They will give you an arrival time based on your scheduled surgery time.  Please be ready to share the following:    Your doctor's clinic name and phone number    Your medical, surgical and anesthesia history    A list of allergies and sensitivities    A list of medicines, including herbal treatments and over-the-counter drugs    Whether the patient has a legal guardian (ask how to send us the papers in advance)  If you have a child who's having surgery, please ask for a copy of Preparing for Your Child's Surgery.    Preparing for surgery    Within 30 days of surgery: Have a pre-op exam (sometimes called an H&P, or History and Physical). This can be done at a clinic or pre-operative center.  ? If you're having a , you may not need this exam. Talk to your care team    At your pre-op exam, talk to your care team about all medicines you take. If you need to stop any medicines before surgery, ask when to start taking them again.  ? We do this for your safety. Many medicines can make you bleed too much during surgery. Some change how well surgery (anesthesia) drugs work.    Call your insurance company to let them know you're having surgery. (If you don't have insurance, call [phone].)    Call your clinic if there's any change in your health. This includes signs of a cold or flu (sore throat, runny nose, cough, rash, fever). It also includes a scrape or scratch near the surgery site.    If you have questions on the day of surgery, call your hospital or surgery center.  Eating and drinking guidelines  For your safety: Unless your surgeon tells you otherwise, follow the guidelines  below.    Eat and drink as usual until 8 hours before surgery. After that, no food or milk.    Drink clear liquids until 2 hours before surgery. These are liquids you can see through, like water, Gatorade and Propel Water. You may also have black coffee and tea (no cream or milk).    Nothing by mouth within 2 hours of surgery. This includes gum, candy and breath mints.    If you drink, stop drinking alcohol the night before surgery.    If your care team tells you to take medicine on the morning of surgery, it's okay to take it with a sip of water.  Preventing infection    Shower or bathe the night before and morning of your surgery. Follow the instructions your clinic gave you. (If no instructions, use regular soap.)    Don't shave or clip hair near your surgery site. We'll remove the hair if needed.    Don't smoke or vape the morning of surgery. You may chew nicotine gum up to 2 hours before surgery. A nicotine patch is okay.  ? Note: Some surgeries require you to completely quit smoking and nicotine. Check with your surgeon.    Your care team will make every effort to keep you safe from infection. We will:  ? Clean our hands often with soap and water (or an alcohol-based hand rub).  ? Clean the skin at your surgery site with a special soap that kills germs.  ? Give you a special gown to keep you warm. (Cold raises the risk of infection.)  ? Wear special hair covers, masks, gowns and gloves during surgery.  ? Give antibiotic medicine, if prescribed. Not all surgeries need antibiotics.  What to bring on the day of surgery    Photo ID and insurance card    Copy of your health care directive, if you have one    Glasses and hearing aides (bring cases)  ? You can't wear contacts during surgery    Inhaler and eye drops, if you use them (tell us about these when you arrive)    CPAP machine or breathing device, if you use them    A few personal items, if spending the night    If you have . . .  ? A pacemaker or ICD  (cardiac defibrillator): Bring the ID card.  ? An implanted stimulator: Bring the remote control.  ? A legal guardian: Bring a copy of the certified (court-stamped) guardianship papers.  Please remove any jewelry, including body piercings. Leave jewelry and other valuables at home.  If you're going home the day of surgery  Important: If you don't follow the rules below, we must cancel your surgery.     Arrange for someone to drive you home after surgery. You may not drive, take a taxi or take public transportation by yourself (unless you'll have local anesthesia only).    Arrange for a responsible adult to stay with you overnight. If you don't, we may keep you in the hospital overnight, and you may need to pay the costs yourself.  Questions?   If you have any questions for your care team, list them here: _________________________________________________________________________________________________________________________________________________________________________________________________________________________________________________________________________________________________________________________  For informational purposes only. Not to replace the advice of your health care provider. Copyright   2003, 2019 [facility]. All rights reserved. Clinically reviewed by Elle Babin MD. [employer] [id number] - REV 4/20.

## 2021-10-27 DIAGNOSIS — I50.22 CHRONIC SYSTOLIC CONGESTIVE HEART FAILURE (H): Primary | ICD-10-CM

## 2021-11-01 ENCOUNTER — ANCILLARY PROCEDURE (OUTPATIENT)
Dept: CARDIOLOGY | Facility: CLINIC | Age: 56
End: 2021-11-01
Attending: INTERNAL MEDICINE
Payer: COMMERCIAL

## 2021-11-01 ENCOUNTER — OFFICE VISIT (OUTPATIENT)
Dept: CARDIOLOGY | Facility: CLINIC | Age: 56
End: 2021-11-01
Attending: NURSE PRACTITIONER
Payer: COMMERCIAL

## 2021-11-01 ENCOUNTER — LAB (OUTPATIENT)
Dept: LAB | Facility: CLINIC | Age: 56
End: 2021-11-01
Payer: COMMERCIAL

## 2021-11-01 VITALS
SYSTOLIC BLOOD PRESSURE: 138 MMHG | HEART RATE: 68 BPM | DIASTOLIC BLOOD PRESSURE: 91 MMHG | BODY MASS INDEX: 23.63 KG/M2 | WEIGHT: 129.2 LBS | OXYGEN SATURATION: 99 %

## 2021-11-01 DIAGNOSIS — I42.9 CARDIOMYOPATHY (H): ICD-10-CM

## 2021-11-01 DIAGNOSIS — I50.22 CHRONIC SYSTOLIC CONGESTIVE HEART FAILURE (H): ICD-10-CM

## 2021-11-01 DIAGNOSIS — I25.5 ISCHEMIC CARDIOMYOPATHY: ICD-10-CM

## 2021-11-01 DIAGNOSIS — Z95.810 ICD (IMPLANTABLE CARDIOVERTER-DEFIBRILLATOR) IN PLACE: ICD-10-CM

## 2021-11-01 DIAGNOSIS — E78.2 MIXED HYPERLIPIDEMIA: ICD-10-CM

## 2021-11-01 DIAGNOSIS — Z98.890 HISTORY OF MITRAL VALVE REPAIR: ICD-10-CM

## 2021-11-01 DIAGNOSIS — I10 BENIGN ESSENTIAL HYPERTENSION: ICD-10-CM

## 2021-11-01 DIAGNOSIS — Z95.810 ICD (IMPLANTABLE CARDIOVERTER-DEFIBRILLATOR) IN PLACE: Primary | ICD-10-CM

## 2021-11-01 DIAGNOSIS — E11.59 TYPE 2 DIABETES MELLITUS WITH OTHER CIRCULATORY COMPLICATION, WITHOUT LONG-TERM CURRENT USE OF INSULIN (H): ICD-10-CM

## 2021-11-01 LAB
ANION GAP SERPL CALCULATED.3IONS-SCNC: 6 MMOL/L (ref 3–14)
BUN SERPL-MCNC: 32 MG/DL (ref 7–30)
CALCIUM SERPL-MCNC: 9.9 MG/DL (ref 8.5–10.1)
CHLORIDE BLD-SCNC: 107 MMOL/L (ref 94–109)
CO2 SERPL-SCNC: 28 MMOL/L (ref 20–32)
CREAT SERPL-MCNC: 1 MG/DL (ref 0.66–1.25)
GFR SERPL CREATININE-BSD FRML MDRD: 85 ML/MIN/1.73M2
GLUCOSE BLD-MCNC: 137 MG/DL (ref 70–99)
POTASSIUM BLD-SCNC: 4.3 MMOL/L (ref 3.4–5.3)
SODIUM SERPL-SCNC: 141 MMOL/L (ref 133–144)

## 2021-11-01 PROCEDURE — 36415 COLL VENOUS BLD VENIPUNCTURE: CPT | Performed by: PATHOLOGY

## 2021-11-01 PROCEDURE — G0463 HOSPITAL OUTPT CLINIC VISIT: HCPCS

## 2021-11-01 PROCEDURE — 93282 PRGRMG EVAL IMPLANTABLE DFB: CPT | Performed by: INTERNAL MEDICINE

## 2021-11-01 PROCEDURE — 99214 OFFICE O/P EST MOD 30 MIN: CPT | Mod: 25 | Performed by: NURSE PRACTITIONER

## 2021-11-01 PROCEDURE — 80048 BASIC METABOLIC PNL TOTAL CA: CPT | Performed by: PATHOLOGY

## 2021-11-01 ASSESSMENT — PAIN SCALES - GENERAL: PAINLEVEL: NO PAIN (0)

## 2021-11-01 NOTE — LETTER
11/1/2021       RE: Luke Henao  [address]  [facility] [id number]       Dear Colleague,    Thank you for the opportunity to participate in the care of your patient, Luke Henao, at the [facility] [place] at [facility]. Please see a copy of my visit note below.    HPI:   Mr. 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 He presents to CORE clinic for routine follow up.     He denies fever, chills, lightheadedness, dizziness, chest pain, palpitations, SOB, RAYMOND, PND, orthopnea, nausea, vomiting, diarrhea, hematochezia, melena, or LE edema. He continues to follow a low sodium diet. Home /80-85. Weight stable around 130 lbs at home.     PAST MEDICAL HISTORY:  Past Medical History:   Diagnosis Date     Benign essential hypertension      CAD (coronary artery disease) 2017    RCA STEMI s/p balloon angioplasty and multiple Sofie to RCA, LCx, and LAD     History of deep venous thrombosis 2017    left internal jugular (line-associated); left common femoral; was on coumadin     History of stroke 2017    no residual deficits     Hyperlipidemia      Ischemic cardiomyopathy 2017    EF 30-35%     Myocardial infarction (H)      Type 2 diabetes mellitus (H)        FAMILY HISTORY:  Family History   Problem Relation Age of Onset     Hypertension Mother      Diabetes Type 2  Father      Hypertension Father      No Known Problems Brother      No Known Problems Sister      No Known Problems Son      No Known Problems Daughter      No Known Problems Sister      No Known Problems Sister      No Known Problems Sister      No Known Problems Sister      No Known Problems Brother       No Known Problems Son      No Known Problems Daughter      Myocardial Infarction No family hx of      Cerebrovascular Disease No family hx of      Coronary Artery Disease Early Onset No family hx of      Colon Cancer No family hx of      Prostate Cancer No family hx of        SOCIAL HISTORY:  Social History     Socioeconomic History     Marital status:      Spouse name: Not on file     Number of children: Not on file     Years of education: Not on file     Highest education level: Not on file   Occupational History     Occupation: [employer]   Tobacco Use     Smoking status: Former Smoker     Packs/day: 0.50     Years: 30.00     Pack years: 15.00     Types: Cigarettes     Quit date: 2017     Years since quittin.8     Smokeless tobacco: Never Used   Substance and Sexual Activity     Alcohol use: No     Drug use: No     Sexual activity: Not Currently   Other Topics Concern     Parent/sibling w/ CABG, MI or angioplasty before 65F 55M? Not Asked   Social History Narrative    . Remarried.    4 children with first marriage.    2 grand children.    No formal exercise.      Social Determinants of Health     Financial Resource Strain:      Difficulty of Paying Living Expenses:    Food Insecurity:      Worried About Running Out of Food in the Last Year:      Ran Out of Food in the Last Year:    Transportation Needs:      Lack of Transportation (Medical):      Lack of Transportation (Non-Medical):    Physical Activity:      Days of Exercise per Week:      Minutes of Exercise per Session:    Stress:      Feeling of Stress :    Social Connections:      Frequency of Communication with Friends and Family:      Frequency of Social Gatherings with Friends and Family:      Attends Confucianism Services:      Active Member of Clubs or Organizations:      Attends Club or Organization Meetings:      Marital Status:    Intimate Partner Violence:      Fear of Current or Ex-Partner:      Emotionally  Abused:      Physically Abused:      Sexually Abused:        CURRENT MEDICATIONS:  Outpatient Medications Prior to Visit   Medication Sig Dispense Refill     aspirin ([employer] ASPIRIN ADULT LOW DOSE) 81 MG chewable tablet Take 1 tablet (81 mg) by mouth daily 90 tablet 2     atorvastatin (LIPITOR) 40 MG tablet Take 1 tablet (40 mg) by mouth daily 90 tablet 3     bumetanide (BUMEX) 0.5 MG tablet Take 1 tablet (0.5 mg) by mouth daily 90 tablet 3     cetirizine (ZYRTEC) 10 MG tablet TAKE 1 TABLET (10 MG) BY MOUTH DAILY 90 tablet 1     co-enzyme Q-10 100 MG CAPS capsule Take by mouth daily       fluticasone (FLONASE) 50 MCG/ACT nasal spray INSTILL 2 SPRAYS INTO BOTH NOSTRILS DAILY 48 mL 3     metFORMIN (GLUCOPHAGE-XR) 500 MG 24 hr tablet TAKE 2 TABLETS (1,000 MG) BY MOUTH DAILY (WITH DINNER) 180 tablet 3     metoprolol succinate ER (TOPROL-XL) 200 MG 24 hr tablet Take 1 tablet (200 mg) by mouth daily 90 tablet 3     ONETOUCH ULTRA test strip USE TO TEST BLOOD SUGAR 3 TIMES DAILY OR AS DIRECTED. 100 strip 3     glipiZIDE (GLUCOTROL) 5 MG tablet Take 1 tablet (5 mg) by mouth 2 times daily (before meals) - further refills must be obtained from PCP or endocrinologist (Patient not taking: Reported on 11/1/2021) 60 tablet 0     olopatadine (PATANOL) 0.1 % ophthalmic solution Place 1 drop into both eyes 2 times daily (Patient not taking: Reported on 9/22/2021) 15 mL 1     sacubitril-valsartan (ENTRESTO) 49-51 MG per tablet Take 1 tablet by mouth 2 times daily 180 tablet 3     No facility-administered medications prior to visit.       ROS:   CONSTITUTIONAL: Denies fever, chills, fatigue, or weight fluctuations.   HEENT: Denies headache, vision changes, and changes in speech.   CV: Refer to HPI.   PULMONARY:Denies shortness of breath, cough, or previous TB exposure.   GI:Denies nausea, vomiting, diarrhea, and abdominal pain. Bowel movements are regular.   :Denies urinary alterations, dysuria, urinary frequency, hematuria, and  abnormal drainage.   EXT:Denies lower extremity edema.   SKIN:Denies abnormal rashes or lesions.   MUSCULOSKELETAL:Denies upper or lower extremity weakness and pain.   NEUROLOGIC:Denies lightheadedness, dizziness, seizures, or upper or lower extremity paresthesia.     EXAM:  BP (!) 138/91 (BP Location: Right arm, Patient Position: Chair, Cuff Size: Adult Regular)   Pulse 68   Wt 58.6 kg (129 lb 3.2 oz)   SpO2 99%   BMI 23.63 kg/m    GENERAL: Appears alert and oriented times three.   HEENT: Eye symmetrical and free of discharge bilaterally. Mucous membranes moist and without lesions.  NECK: Supple and without lymphadenopathy. JVD below clavicular line upright.   CV: RRR, S1S2 present without murmur, rub, or gallop.   RESPIRATORY: Respirations regular, even, and unlabored. Lungs CTA throughout.   GI: Soft and non distended with normoactive bowel sounds present in all quadrants. No tenderness, rebound, guarding. No organomegaly.   EXTREMITIES: No peripheral edema. 2+ bilateral pedal pulses.   NEUROLOGIC: Alert and orientated x 3. CN II-XII grossly intact. No focal deficits.   MUSCULOSKELETAL: No joint swelling or tenderness.   SKIN: No jaundice. No rashes or lesions.     The following Labs were reviewed today:  CBC RESULTS:  Lab Results   Component Value Date    WBC 8.8 05/19/2021    RBC 5.23 05/19/2021    HGB 15.5 05/19/2021    HCT 48.5 05/19/2021    MCV 93 05/19/2021    MCH 29.6 05/19/2021    MCHC 32.0 05/19/2021    RDW 15.3 (H) 05/19/2021     05/19/2021       CMP RESULTS:  Lab Results   Component Value Date     11/01/2021     05/19/2021    POTASSIUM 4.3 11/01/2021    POTASSIUM 4.9 05/19/2021    CHLORIDE 107 11/01/2021    CHLORIDE 106 05/19/2021    CO2 28 11/01/2021    CO2 33 (H) 05/19/2021    ANIONGAP 6 11/01/2021    ANIONGAP <1 (L) 05/19/2021     (H) 11/01/2021     (H) 05/19/2021    BUN 32 (H) 11/01/2021    BUN 24 05/19/2021    CR 1.00 11/01/2021    CR 1.18 05/19/2021     GFRESTIMATED 85 11/01/2021    GFRESTIMATED 70 05/19/2021    GFRESTBLACK 81 05/19/2021    ROB 9.9 11/01/2021    ROB 8.9 05/19/2021    BILITOTAL 2.0 (H) 05/19/2021    ALBUMIN 3.6 05/19/2021    ALKPHOS 75 05/19/2021    ALT 35 05/19/2021    AST 28 05/19/2021        INR RESULTS:  Lab Results   Component Value Date    INR 2.3 (A) 12/26/2017    INR 2.36 (H) 11/03/2017       Lab Results   Component Value Date    MAG 2.4 (H) 06/23/2017     Lab Results   Component Value Date    NTBNPI 9,984 (H) 04/08/2017     Lab Results   Component Value Date    NTBNP 1,032 (H) 01/19/2021       The following diagnostics were reviewed today:   Device Check 11/1/21:   Device: Medtronic Visia AF VR  Normal device function  Mode: VVI 40 bpm  : 0%  Intrinsic rhythm: SR @ 71 bpm  Thoracic Impedance: Above reference line.   Short V-V intervals: 0  Lead Trends Appear Stable: yes  Estimated battery longevity to RRT = 9 years. Battery voltage = 3.01V.   AF [place]: 0%  Anticoagulant: none  Ventricular Arrhythmia: 0  Setting Changes: none    Echo 1/19/21:   Interpretation Summary  Mild left ventricular dilation is present. Severely (EF 15-20%) reduced left  ventricular function is present. Diffuse severe hypokinesis present. Basal and  mid Inferior and inferolateral akinesis present     Global right ventricular function is mildly to moderately reduced.     Post MitraClip placement in 2017. Mild mitral insufficiency is present. Mean  gradient 2.5 mmHg at 69 bpm.     Pulmonary hypertension is present. Estimated pulmonary artery systolic  pressure is 48 mmHg.     The inferior vena cava was normal in size with preserved respiratory  variability.     No pericardial effusion is present.    Assessment and Plan:   Mr. 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 He presents to CORE clinic for routine follow up.      Chronic systolic heart failure secondary to ICM.    Stage C, NYHA Class II  ACEi/ARB Entresto increased to 49/51 in AM at 98/102  BB Increase Toprol  mg po daily   Aldosterone antagonist contraindicated due to transient renal function   SCD prophylaxis ICD.   Fluid status Euvolemic. Bumex 0.5 mg po daily. If any dizziness may need to discontinue Bumex in setting of rising Entresto dose. He will watch weight closely.      CAD. S/p POBA RCA, FEMI to RCA times 7, LCx, and LAD 3/27/17 with refractory cardiogenic shock s/p IABP transitioned to subclavian balloon pump inpatient.   - ASA, Statin, BB, and Plavix.      Left IJ DVT. Provoked event, present on US 4/29/17, but nonocclusive. Initial diagnosis 4/23/17. Completed 3 months of Coumadin.   - Continue ASA      CVA. Bilateral hemispheric infarcts secondary to watershed ischemia. Per Neurology no indication for long term anticoagulation from their perspective.   - Continue ASA and Lipitor.      MR s/p Mitral clip. Implanted 5/1/17. Secondary to Ischemic MR.  - Echo 1/21 consistent with mild mitral insufficiency is present. Mean gradient 2.5 mmHg at 69 bpm.     Follow up BMP in 1 week and CORE in 3 months.       Shyanne Montalvo, APRN CNP  11/1/2021

## 2021-11-01 NOTE — PATIENT INSTRUCTIONS
It was a pleasure to see you in clinic today.  Please do not hesitate to call with any questions or concerns.  You are scheduled for a remote transmission on 2/3/2022.  We look forward to seeing you in clinic at your next device check in 6 months.    Glenn Gallardo, RN  Electrophysiology Nurse Clinician  [facility]    During Business Hours Please Call:  [phone]  After Hours Please Call:  [phone] - select option #4 and ask for job code [id number]

## 2021-11-01 NOTE — NURSING NOTE
Chief Complaint   Patient presents with     Follow Up     11/1/21 Return CORE; 56 year old male with chronic systolic heart failure presents for follow up with labs and device check prior     Vitals were taken and medications reconciled.    Kofi Lipscomb, EMT  8:25 AM

## 2021-11-01 NOTE — PATIENT INSTRUCTIONS
Take your medicines every day, as directed    Changes made today:  o Increase Entresto to 1 tab (49/51 mg) in AM, 2 tabs (97/103)  in the PM  o    Monitor Your Weight and Symptoms    Contact us if you:      Gain 2 pounds in one day or 5 pounds in one week    Feel more short of breath    Notice more leg swelling    Feel lightheadeded   Change your lifestyle    Limit Salt or Sodium:    2000 mg  Limit Fluids:    2000 mL or approximately 64 ounces  Eat a Heart Healthy Diet    Low in saturated fats  Stay Active:    Aim to move at least 150 minutes every  week         To Contact us    During Business Hours:  [phone], option # 1 (University)  Then option # 4 (medical questions)     After hours, weekends or holidays:   [phone], Option #4  Ask to speak to the On-Call Cardiologist. Inform them you are a CORE/heart failure patient at the [place].     Use [employer] allows you to communicate directly with your heart team through secure messaging.    [employer] can be accessed any time on your phone, computer, or tablet.    If you need assistance, we'd be happy to help!         Keep your Heart Appointments:    Get labs in 1 week  Follow up in 3 months with CORE Clinic

## 2021-11-01 NOTE — PROGRESS NOTES
HPI:   Mr. 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 He presents to CORE clinic for routine follow up.     He denies fever, chills, lightheadedness, dizziness, chest pain, palpitations, SOB, RAYMOND, PND, orthopnea, nausea, vomiting, diarrhea, hematochezia, melena, or LE edema. He continues to follow a low sodium diet. Home /80-85. Weight stable around 130 lbs at home.     PAST MEDICAL HISTORY:  Past Medical History:   Diagnosis Date     Benign essential hypertension      CAD (coronary artery disease) 2017    RCA STEMI s/p balloon angioplasty and multiple Sofie to RCA, LCx, and LAD     History of deep venous thrombosis 2017    left internal jugular (line-associated); left common femoral; was on coumadin     History of stroke 2017    no residual deficits     Hyperlipidemia      Ischemic cardiomyopathy 2017    EF 30-35%     Myocardial infarction (H)      Type 2 diabetes mellitus (H)        FAMILY HISTORY:  Family History   Problem Relation Age of Onset     Hypertension Mother      Diabetes Type 2  Father      Hypertension Father      No Known Problems Brother      No Known Problems Sister      No Known Problems Son      No Known Problems Daughter      No Known Problems Sister      No Known Problems Sister      No Known Problems Sister      No Known Problems Sister      No Known Problems Brother      No Known Problems Son      No Known Problems Daughter      Myocardial Infarction No family hx of      Cerebrovascular Disease No family hx of      Coronary Artery Disease Early Onset No family hx of      Colon Cancer No family hx of      Prostate Cancer No family hx of        SOCIAL HISTORY:  Social History     Socioeconomic History      Marital status:      Spouse name: Not on file     Number of children: Not on file     Years of education: Not on file     Highest education level: Not on file   Occupational History     Occupation: [employer]   Tobacco Use     Smoking status: Former Smoker     Packs/day: 0.50     Years: 30.00     Pack years: 15.00     Types: Cigarettes     Quit date: 2017     Years since quittin.8     Smokeless tobacco: Never Used   Substance and Sexual Activity     Alcohol use: No     Drug use: No     Sexual activity: Not Currently   Other Topics Concern     Parent/sibling w/ CABG, MI or angioplasty before 65F 55M? Not Asked   Social History Narrative    . Remarried.    4 children with first marriage.    2 grand children.    No formal exercise.      Social Determinants of Health     Financial Resource Strain:      Difficulty of Paying Living Expenses:    Food Insecurity:      Worried About Running Out of Food in the Last Year:      Ran Out of Food in the Last Year:    Transportation Needs:      Lack of Transportation (Medical):      Lack of Transportation (Non-Medical):    Physical Activity:      Days of Exercise per Week:      Minutes of Exercise per Session:    Stress:      Feeling of Stress :    Social Connections:      Frequency of Communication with Friends and Family:      Frequency of Social Gatherings with Friends and Family:      Attends Moravian Services:      Active Member of Clubs or Organizations:      Attends Club or Organization Meetings:      Marital Status:    Intimate Partner Violence:      Fear of Current or Ex-Partner:      Emotionally Abused:      Physically Abused:      Sexually Abused:        CURRENT MEDICATIONS:  Outpatient Medications Prior to Visit   Medication Sig Dispense Refill     aspirin ([employer]S ASPIRIN ADULT LOW DOSE) 81 MG chewable tablet Take 1 tablet (81 mg) by mouth daily 90 tablet 2     atorvastatin (LIPITOR) 40 MG tablet Take 1 tablet (40 mg) by mouth daily 90  tablet 3     bumetanide (BUMEX) 0.5 MG tablet Take 1 tablet (0.5 mg) by mouth daily 90 tablet 3     cetirizine (ZYRTEC) 10 MG tablet TAKE 1 TABLET (10 MG) BY MOUTH DAILY 90 tablet 1     co-enzyme Q-10 100 MG CAPS capsule Take by mouth daily       fluticasone (FLONASE) 50 MCG/ACT nasal spray INSTILL 2 SPRAYS INTO BOTH NOSTRILS DAILY 48 mL 3     metFORMIN (GLUCOPHAGE-XR) 500 MG 24 hr tablet TAKE 2 TABLETS (1,000 MG) BY MOUTH DAILY (WITH DINNER) 180 tablet 3     metoprolol succinate ER (TOPROL-XL) 200 MG 24 hr tablet Take 1 tablet (200 mg) by mouth daily 90 tablet 3     ONETOUCH ULTRA test strip USE TO TEST BLOOD SUGAR 3 TIMES DAILY OR AS DIRECTED. 100 strip 3     glipiZIDE (GLUCOTROL) 5 MG tablet Take 1 tablet (5 mg) by mouth 2 times daily (before meals) - further refills must be obtained from PCP or endocrinologist (Patient not taking: Reported on 11/1/2021) 60 tablet 0     olopatadine (PATANOL) 0.1 % ophthalmic solution Place 1 drop into both eyes 2 times daily (Patient not taking: Reported on 9/22/2021) 15 mL 1     sacubitril-valsartan (ENTRESTO) 49-51 MG per tablet Take 1 tablet by mouth 2 times daily 180 tablet 3     No facility-administered medications prior to visit.       ROS:   CONSTITUTIONAL: Denies fever, chills, fatigue, or weight fluctuations.   HEENT: Denies headache, vision changes, and changes in speech.   CV: Refer to HPI.   PULMONARY:Denies shortness of breath, cough, or previous TB exposure.   GI:Denies nausea, vomiting, diarrhea, and abdominal pain. Bowel movements are regular.   :Denies urinary alterations, dysuria, urinary frequency, hematuria, and abnormal drainage.   EXT:Denies lower extremity edema.   SKIN:Denies abnormal rashes or lesions.   MUSCULOSKELETAL:Denies upper or lower extremity weakness and pain.   NEUROLOGIC:Denies lightheadedness, dizziness, seizures, or upper or lower extremity paresthesia.     EXAM:  BP (!) 138/91 (BP Location: Right arm, Patient Position: Chair, Cuff Size:  Adult Regular)   Pulse 68   Wt 58.6 kg (129 lb 3.2 oz)   SpO2 99%   BMI 23.63 kg/m    GENERAL: Appears alert and oriented times three.   HEENT: Eye symmetrical and free of discharge bilaterally. Mucous membranes moist and without lesions.  NECK: Supple and without lymphadenopathy. JVD below clavicular line upright.   CV: RRR, S1S2 present without murmur, rub, or gallop.   RESPIRATORY: Respirations regular, even, and unlabored. Lungs CTA throughout.   GI: Soft and non distended with normoactive bowel sounds present in all quadrants. No tenderness, rebound, guarding. No organomegaly.   EXTREMITIES: No peripheral edema. 2+ bilateral pedal pulses.   NEUROLOGIC: Alert and orientated x 3. CN II-XII grossly intact. No focal deficits.   MUSCULOSKELETAL: No joint swelling or tenderness.   SKIN: No jaundice. No rashes or lesions.     The following Labs were reviewed today:  CBC RESULTS:  Lab Results   Component Value Date    WBC 8.8 05/19/2021    RBC 5.23 05/19/2021    HGB 15.5 05/19/2021    HCT 48.5 05/19/2021    MCV 93 05/19/2021    MCH 29.6 05/19/2021    MCHC 32.0 05/19/2021    RDW 15.3 (H) 05/19/2021     05/19/2021       CMP RESULTS:  Lab Results   Component Value Date     11/01/2021     05/19/2021    POTASSIUM 4.3 11/01/2021    POTASSIUM 4.9 05/19/2021    CHLORIDE 107 11/01/2021    CHLORIDE 106 05/19/2021    CO2 28 11/01/2021    CO2 33 (H) 05/19/2021    ANIONGAP 6 11/01/2021    ANIONGAP <1 (L) 05/19/2021     (H) 11/01/2021     (H) 05/19/2021    BUN 32 (H) 11/01/2021    BUN 24 05/19/2021    CR 1.00 11/01/2021    CR 1.18 05/19/2021    GFRESTIMATED 85 11/01/2021    GFRESTIMATED 70 05/19/2021    GFRESTBLACK 81 05/19/2021    ROB 9.9 11/01/2021    ROB 8.9 05/19/2021    BILITOTAL 2.0 (H) 05/19/2021    ALBUMIN 3.6 05/19/2021    ALKPHOS 75 05/19/2021    ALT 35 05/19/2021    AST 28 05/19/2021        INR RESULTS:  Lab Results   Component Value Date    INR 2.3 (A) 12/26/2017    INR 2.36 (H)  11/03/2017       Lab Results   Component Value Date    MAG 2.4 (H) 06/23/2017     Lab Results   Component Value Date    NTBNPI 9,984 (H) 04/08/2017     Lab Results   Component Value Date    NTBNP 1,032 (H) 01/19/2021       The following diagnostics were reviewed today:   Device Check 11/1/21:   Device: Medtronic Visia AF VR  Normal device function  Mode: VVI 40 bpm  : 0%  Intrinsic rhythm: SR @ 71 bpm  Thoracic Impedance: Above reference line.   Short V-V intervals: 0  Lead Trends Appear Stable: yes  Estimated battery longevity to RRT = 9 years. Battery voltage = 3.01V.   AF [place]: 0%  Anticoagulant: none  Ventricular Arrhythmia: 0  Setting Changes: none    Echo 1/19/21:   Interpretation Summary  Mild left ventricular dilation is present. Severely (EF 15-20%) reduced left  ventricular function is present. Diffuse severe hypokinesis present. Basal and  mid Inferior and inferolateral akinesis present     Global right ventricular function is mildly to moderately reduced.     Post MitraClip placement in 2017. Mild mitral insufficiency is present. Mean  gradient 2.5 mmHg at 69 bpm.     Pulmonary hypertension is present. Estimated pulmonary artery systolic  pressure is 48 mmHg.     The inferior vena cava was normal in size with preserved respiratory  variability.     No pericardial effusion is present.    Assessment and Plan:   Mr. 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 He presents to CORE clinic for routine follow up.      Chronic systolic heart failure secondary to ICM.    Stage C, NYHA Class II  ACEi/ARB Entresto increased to 49/51 in AM at 98/102  BB Increase Toprol  mg po daily   Aldosterone antagonist  contraindicated due to transient renal function   SCD prophylaxis ICD.   Fluid status Euvolemic. Bumex 0.5 mg po daily. If any dizziness may need to discontinue Bumex in setting of rising Entresto dose. He will watch weight closely.      CAD. S/p POBA RCA, FEMI to RCA times 7, LCx, and LAD 3/27/17 with refractory cardiogenic shock s/p IABP transitioned to subclavian balloon pump inpatient.   - ASA, Statin, BB, and Plavix.      Left IJ DVT. Provoked event, present on US 4/29/17, but nonocclusive. Initial diagnosis 4/23/17. Completed 3 months of Coumadin.   - Continue ASA      CVA. Bilateral hemispheric infarcts secondary to watershed ischemia. Per Neurology no indication for long term anticoagulation from their perspective.   - Continue ASA and Lipitor.      MR s/p Mitral clip. Implanted 5/1/17. Secondary to Ischemic MR.  - Echo 1/21 consistent with mild mitral insufficiency is present. Mean gradient 2.5 mmHg at 69 bpm.     Follow up BMP in 1 week and CORE in 3 months.       Shyanne Montalvo, APRN CNP  11/1/2021          CC  SELF, REFERRED

## 2021-11-05 ENCOUNTER — TRANSFERRED RECORDS (OUTPATIENT)
Dept: HEALTH INFORMATION MANAGEMENT | Facility: CLINIC | Age: 56
End: 2021-11-05
Payer: COMMERCIAL

## 2021-11-05 LAB — RETINOPATHY: POSITIVE

## 2021-11-06 DIAGNOSIS — I25.10 CORONARY ARTERY DISEASE INVOLVING NATIVE CORONARY ARTERY OF NATIVE HEART WITHOUT ANGINA PECTORIS: ICD-10-CM

## 2021-11-06 DIAGNOSIS — I50.22 CHRONIC SYSTOLIC CONGESTIVE HEART FAILURE (H): Primary | ICD-10-CM

## 2021-11-08 ENCOUNTER — PATIENT OUTREACH (OUTPATIENT)
Dept: CARDIOLOGY | Facility: CLINIC | Age: 56
End: 2021-11-08

## 2021-11-08 LAB
MDC_IDC_LEAD_IMPLANT_DT: NORMAL
MDC_IDC_LEAD_LOCATION: NORMAL
MDC_IDC_LEAD_LOCATION_DETAIL_1: NORMAL
MDC_IDC_LEAD_MFG: NORMAL
MDC_IDC_LEAD_MODEL: NORMAL
MDC_IDC_LEAD_POLARITY_TYPE: NORMAL
MDC_IDC_LEAD_SERIAL: NORMAL
MDC_IDC_LEAD_SPECIAL_FUNCTION: NORMAL
MDC_IDC_MSMT_BATTERY_DTM: NORMAL
MDC_IDC_MSMT_BATTERY_REMAINING_LONGEVITY: 106 MO
MDC_IDC_MSMT_BATTERY_RRT_TRIGGER: 2.73
MDC_IDC_MSMT_BATTERY_STATUS: NORMAL
MDC_IDC_MSMT_BATTERY_VOLTAGE: 3.01 V
MDC_IDC_MSMT_CAP_CHARGE_DTM: NORMAL
MDC_IDC_MSMT_CAP_CHARGE_ENERGY: 18 J
MDC_IDC_MSMT_CAP_CHARGE_TIME: 3.59
MDC_IDC_MSMT_CAP_CHARGE_TYPE: NORMAL
MDC_IDC_MSMT_LEADCHNL_RV_IMPEDANCE_VALUE: 304 OHM
MDC_IDC_MSMT_LEADCHNL_RV_IMPEDANCE_VALUE: 342 OHM
MDC_IDC_MSMT_LEADCHNL_RV_PACING_THRESHOLD_AMPLITUDE: 0.5 V
MDC_IDC_MSMT_LEADCHNL_RV_PACING_THRESHOLD_PULSEWIDTH: 0.4 MS
MDC_IDC_MSMT_LEADCHNL_RV_SENSING_INTR_AMPL: 9.9 MV
MDC_IDC_PG_IMPLANT_DTM: NORMAL
MDC_IDC_PG_MFG: NORMAL
MDC_IDC_PG_MODEL: NORMAL
MDC_IDC_PG_SERIAL: NORMAL
MDC_IDC_PG_TYPE: NORMAL
MDC_IDC_SESS_CLINIC_NAME: NORMAL
MDC_IDC_SESS_DTM: NORMAL
MDC_IDC_SESS_TYPE: NORMAL
MDC_IDC_SET_BRADY_HYSTRATE: NORMAL
MDC_IDC_SET_BRADY_LOWRATE: 40 {BEATS}/MIN
MDC_IDC_SET_BRADY_MODE: NORMAL
MDC_IDC_SET_LEADCHNL_RV_PACING_AMPLITUDE: 2 V
MDC_IDC_SET_LEADCHNL_RV_PACING_ANODE_ELECTRODE_1: NORMAL
MDC_IDC_SET_LEADCHNL_RV_PACING_ANODE_LOCATION_1: NORMAL
MDC_IDC_SET_LEADCHNL_RV_PACING_CAPTURE_MODE: NORMAL
MDC_IDC_SET_LEADCHNL_RV_PACING_CATHODE_ELECTRODE_1: NORMAL
MDC_IDC_SET_LEADCHNL_RV_PACING_CATHODE_LOCATION_1: NORMAL
MDC_IDC_SET_LEADCHNL_RV_PACING_POLARITY: NORMAL
MDC_IDC_SET_LEADCHNL_RV_PACING_PULSEWIDTH: 0.4 MS
MDC_IDC_SET_LEADCHNL_RV_SENSING_ANODE_ELECTRODE_1: NORMAL
MDC_IDC_SET_LEADCHNL_RV_SENSING_ANODE_LOCATION_1: NORMAL
MDC_IDC_SET_LEADCHNL_RV_SENSING_CATHODE_ELECTRODE_1: NORMAL
MDC_IDC_SET_LEADCHNL_RV_SENSING_CATHODE_LOCATION_1: NORMAL
MDC_IDC_SET_LEADCHNL_RV_SENSING_POLARITY: NORMAL
MDC_IDC_SET_LEADCHNL_RV_SENSING_SENSITIVITY: 0.3 MV
MDC_IDC_SET_ZONE_DETECTION_BEATS_DENOMINATOR: 40 {BEATS}
MDC_IDC_SET_ZONE_DETECTION_BEATS_NUMERATOR: 30 {BEATS}
MDC_IDC_SET_ZONE_DETECTION_INTERVAL: 300 MS
MDC_IDC_SET_ZONE_DETECTION_INTERVAL: 360 MS
MDC_IDC_SET_ZONE_DETECTION_INTERVAL: 390 MS
MDC_IDC_SET_ZONE_DETECTION_INTERVAL: NORMAL
MDC_IDC_SET_ZONE_TYPE: NORMAL
MDC_IDC_STAT_AT_BURDEN_PERCENT: 0 %
MDC_IDC_STAT_AT_DTM_END: NORMAL
MDC_IDC_STAT_AT_DTM_START: NORMAL
MDC_IDC_STAT_BRADY_DTM_END: NORMAL
MDC_IDC_STAT_BRADY_DTM_START: NORMAL
MDC_IDC_STAT_BRADY_RV_PERCENT_PACED: 0.02 %
MDC_IDC_STAT_EPISODE_RECENT_COUNT: 0
MDC_IDC_STAT_EPISODE_RECENT_COUNT_DTM_END: NORMAL
MDC_IDC_STAT_EPISODE_RECENT_COUNT_DTM_START: NORMAL
MDC_IDC_STAT_EPISODE_TOTAL_COUNT: 0
MDC_IDC_STAT_EPISODE_TOTAL_COUNT_DTM_END: NORMAL
MDC_IDC_STAT_EPISODE_TOTAL_COUNT_DTM_START: NORMAL
MDC_IDC_STAT_EPISODE_TYPE: NORMAL
MDC_IDC_STAT_TACHYTHERAPY_ATP_DELIVERED_RECENT: 0
MDC_IDC_STAT_TACHYTHERAPY_ATP_DELIVERED_TOTAL: 0
MDC_IDC_STAT_TACHYTHERAPY_RECENT_DTM_END: NORMAL
MDC_IDC_STAT_TACHYTHERAPY_RECENT_DTM_START: NORMAL
MDC_IDC_STAT_TACHYTHERAPY_SHOCKS_ABORTED_RECENT: 0
MDC_IDC_STAT_TACHYTHERAPY_SHOCKS_ABORTED_TOTAL: 0
MDC_IDC_STAT_TACHYTHERAPY_SHOCKS_DELIVERED_RECENT: 0
MDC_IDC_STAT_TACHYTHERAPY_SHOCKS_DELIVERED_TOTAL: 0
MDC_IDC_STAT_TACHYTHERAPY_TOTAL_DTM_END: NORMAL
MDC_IDC_STAT_TACHYTHERAPY_TOTAL_DTM_START: NORMAL

## 2021-11-08 NOTE — TELEPHONE ENCOUNTER
Called Luke to check in. Seen in clinic 1 week ago and Entresto increased to 49/51 mg in AM, 97/103 mg in PM. Was supposed to get labs in 1 week. Missed lab appointment today. Called with Romanian . Left message and asked for call back. Soraya Messina RN

## 2021-11-09 RX ORDER — METOPROLOL SUCCINATE 200 MG/1
200 TABLET, EXTENDED RELEASE ORAL DAILY
Qty: 90 TABLET | Refills: 0 | Status: SHIPPED | OUTPATIENT
Start: 2021-11-09 | End: 2022-02-02

## 2021-11-09 RX ORDER — ATORVASTATIN CALCIUM 40 MG/1
40 TABLET, FILM COATED ORAL DAILY
Qty: 90 TABLET | Refills: 3 | Status: SHIPPED | OUTPATIENT
Start: 2021-11-09 | End: 2022-11-03

## 2021-11-11 ENCOUNTER — APPOINTMENT (OUTPATIENT)
Dept: INTERPRETER SERVICES | Facility: CLINIC | Age: 56
End: 2021-11-11
Payer: COMMERCIAL

## 2021-11-11 NOTE — TELEPHONE ENCOUNTER
Called Luke again with Austrian  to check in and schedule labs that he is due for. Left detailed message via  and asked for call back. Soraya Messina RN

## 2021-11-15 ENCOUNTER — PATIENT OUTREACH (OUTPATIENT)
Dept: CARDIOLOGY | Facility: CLINIC | Age: 56
End: 2021-11-15
Payer: COMMERCIAL

## 2021-11-15 NOTE — TELEPHONE ENCOUNTER
Tried to call Javier again to check in after increasing Entresto. Is due for labs.   Called via . No answer. Detailed voicemail left asking for call back.. Soraya Messina RN

## 2021-11-23 ENCOUNTER — PATIENT OUTREACH (OUTPATIENT)
Dept: CARDIOLOGY | Facility: CLINIC | Age: 56
End: 2021-11-23
Payer: COMMERCIAL

## 2021-11-23 NOTE — TELEPHONE ENCOUNTER
Called Luke with Irish  to follow up. Increased Entresto at clinic visit on 11/1 and have been unable to reach to get follow up labs.   Left another message and asked for call back. Soraya Messina RN

## 2021-11-27 DIAGNOSIS — I25.5 ISCHEMIC CARDIOMYOPATHY: Primary | ICD-10-CM

## 2021-12-01 RX ORDER — BUMETANIDE 0.5 MG/1
0.5 TABLET ORAL DAILY
Qty: 90 TABLET | Refills: 0 | Status: SHIPPED | OUTPATIENT
Start: 2021-12-01 | End: 2022-02-23

## 2021-12-01 NOTE — TELEPHONE ENCOUNTER
Last Clinic Visit: [facility]iology Clinic 11/1/21  Elevated Bp  BP Readings from Last 3 Encounters:   11/01/21 (!) 138/91   09/22/21 (!) 142/88   08/04/21 (!) 150/88   90 day malik refill

## 2022-01-26 ENCOUNTER — TELEPHONE (OUTPATIENT)
Dept: CARDIOLOGY | Facility: CLINIC | Age: 57
End: 2022-01-26
Payer: COMMERCIAL

## 2022-01-26 NOTE — TELEPHONE ENCOUNTER
[facility] Call Center    Phone Message    May a detailed message be left on voicemail: yes     Reason for Call: Medication Refill Request      Action Taken: Message routed to:  [facility] [place]    Travel Screening: Not Applicable

## 2022-01-26 NOTE — TELEPHONE ENCOUNTER
Called back with Bulgarian . Spoke with wife who reports she isn't sure which medication he needs refilled. He isn't home right now. I reminded her he also is overdue for labs. In November Entresto was increased and he didn't get follow up labs. Multiple attempts made at that time to reach him.     Wife will have Bong call us back. Soraya Messina RN

## 2022-01-27 NOTE — TELEPHONE ENCOUNTER
Tried to call Luke back. Has a separate refill request in for his ASA which will fill to get through till his next visit in April with [facility] but needs to schedule labs. Left a voicemail via  that he needs to call back to schedule labs. Soraya Messina RN

## 2022-01-30 DIAGNOSIS — I50.22 CHRONIC SYSTOLIC CONGESTIVE HEART FAILURE (H): ICD-10-CM

## 2022-02-02 ENCOUNTER — TELEPHONE (OUTPATIENT)
Dept: CARDIOLOGY | Facility: CLINIC | Age: 57
End: 2022-02-02
Payer: COMMERCIAL

## 2022-02-02 RX ORDER — METOPROLOL SUCCINATE 200 MG/1
200 TABLET, EXTENDED RELEASE ORAL DAILY
Qty: 90 TABLET | Refills: 0 | Status: SHIPPED | OUTPATIENT
Start: 2022-02-02 | End: 2022-05-05

## 2022-02-02 NOTE — TELEPHONE ENCOUNTER
METOPROLOL SUCC  MG TAB  Last Written Prescription Date:   11/9/2021  Last Fill Quantity: 90,   # refills: 0  Last Office Visit :  11/1/2021  Future Office visit:   4/7/2022   90 Tabs, sent to pharm 2/2/2022    Chanell Baires RN  Central Triage Red Flags/Med Refills

## 2022-02-02 NOTE — TELEPHONE ENCOUNTER
[facility] Call Center    Phone Message    May a detailed message be left on voicemail: yes     Reason for Call: Other: patient is asking if he can go draw blood at his clinic closer to home      Action Taken: Message routed to:  Other: routed to [facility]    Travel Screening: Not Applicable

## 2022-02-03 ENCOUNTER — LAB (OUTPATIENT)
Dept: LAB | Facility: CLINIC | Age: 57
End: 2022-02-03
Attending: INTERNAL MEDICINE

## 2022-02-03 ENCOUNTER — ANCILLARY PROCEDURE (OUTPATIENT)
Dept: CARDIOLOGY | Facility: CLINIC | Age: 57
End: 2022-02-03
Attending: INTERNAL MEDICINE
Payer: COMMERCIAL

## 2022-02-03 DIAGNOSIS — I50.22 CHRONIC SYSTOLIC CONGESTIVE HEART FAILURE (H): ICD-10-CM

## 2022-02-03 DIAGNOSIS — I42.9 CARDIOMYOPATHY (H): ICD-10-CM

## 2022-02-03 LAB
ANION GAP SERPL CALCULATED.3IONS-SCNC: 7 MMOL/L (ref 3–14)
BUN SERPL-MCNC: 22 MG/DL (ref 7–30)
CALCIUM SERPL-MCNC: 8.9 MG/DL (ref 8.5–10.1)
CHLORIDE BLD-SCNC: 104 MMOL/L (ref 94–109)
CO2 SERPL-SCNC: 28 MMOL/L (ref 20–32)
CREAT SERPL-MCNC: 1.08 MG/DL (ref 0.66–1.25)
GFR SERPL CREATININE-BSD FRML MDRD: 82 ML/MIN/1.73M2
GLUCOSE BLD-MCNC: 148 MG/DL (ref 70–99)
POTASSIUM BLD-SCNC: 4 MMOL/L (ref 3.4–5.3)
SODIUM SERPL-SCNC: 139 MMOL/L (ref 133–144)

## 2022-02-03 PROCEDURE — 36415 COLL VENOUS BLD VENIPUNCTURE: CPT | Performed by: PATHOLOGY

## 2022-02-03 PROCEDURE — 80048 BASIC METABOLIC PNL TOTAL CA: CPT | Performed by: PATHOLOGY

## 2022-02-03 PROCEDURE — 93296 REM INTERROG EVL PM/IDS: CPT

## 2022-02-03 PROCEDURE — 93295 DEV INTERROG REMOTE 1/2/MLT: CPT | Performed by: INTERNAL MEDICINE

## 2022-02-17 DIAGNOSIS — Z91.09 ENVIRONMENTAL ALLERGIES: ICD-10-CM

## 2022-02-17 RX ORDER — CETIRIZINE HYDROCHLORIDE 10 MG/1
TABLET ORAL
Qty: 90 TABLET | Refills: 1 | Status: SHIPPED | OUTPATIENT
Start: 2022-02-17 | End: 2022-08-24

## 2022-02-20 DIAGNOSIS — I25.5 ISCHEMIC CARDIOMYOPATHY: ICD-10-CM

## 2022-02-23 DIAGNOSIS — E11.9 TYPE 2 DIABETES MELLITUS WITHOUT COMPLICATION, WITHOUT LONG-TERM CURRENT USE OF INSULIN (H): ICD-10-CM

## 2022-02-23 RX ORDER — METFORMIN HCL 500 MG
1000 TABLET, EXTENDED RELEASE 24 HR ORAL
Qty: 180 TABLET | Refills: 1 | OUTPATIENT
Start: 2022-02-23

## 2022-02-23 RX ORDER — BUMETANIDE 0.5 MG/1
TABLET ORAL
Qty: 90 TABLET | Refills: 0 | Status: SHIPPED | OUTPATIENT
Start: 2022-02-23 | End: 2022-05-24

## 2022-02-23 NOTE — TELEPHONE ENCOUNTER
Routing refill request to provider for review/approval because:  Failed protocol due to:    Patient has documented A1c within the specified period of time.    Patient does NOT have a diagnosis of CHF.       Lucien Ryan RN  [facility] Triage Nurse

## 2022-02-23 NOTE — TELEPHONE ENCOUNTER
He is overdue for his diabetes follow-up. Please ask him to schedule this appointment ASAP. Can refill medication temporarily if he anticipates running out prior to scheduled appointment.     Thank you.

## 2022-02-23 NOTE — TELEPHONE ENCOUNTER
LCV: 11/1/2021  [facility]  Abnormal Cr- reviewed, BP above protocol parameter- noted in clinic note  RF 90 day  NCV:4-7-22

## 2022-02-24 RX ORDER — METFORMIN HCL 500 MG
1000 TABLET, EXTENDED RELEASE 24 HR ORAL
Qty: 60 TABLET | Refills: 0 | Status: SHIPPED | OUTPATIENT
Start: 2022-02-24 | End: 2022-04-04

## 2022-02-24 NOTE — TELEPHONE ENCOUNTER
Pt is scheduled for a diabetic follow up on 4/8/2022. Pt will run out of his metformin so he is requesting a refill for that only as of right now. Medication pending.

## 2022-03-10 LAB
MDC_IDC_LEAD_IMPLANT_DT: NORMAL
MDC_IDC_LEAD_LOCATION: NORMAL
MDC_IDC_LEAD_LOCATION_DETAIL_1: NORMAL
MDC_IDC_LEAD_MFG: NORMAL
MDC_IDC_LEAD_MODEL: NORMAL
MDC_IDC_LEAD_POLARITY_TYPE: NORMAL
MDC_IDC_LEAD_SERIAL: NORMAL
MDC_IDC_LEAD_SPECIAL_FUNCTION: NORMAL
MDC_IDC_MSMT_BATTERY_DTM: NORMAL
MDC_IDC_MSMT_BATTERY_REMAINING_LONGEVITY: 102 MO
MDC_IDC_MSMT_BATTERY_RRT_TRIGGER: 2.73
MDC_IDC_MSMT_BATTERY_STATUS: NORMAL
MDC_IDC_MSMT_BATTERY_VOLTAGE: 2.92 V
MDC_IDC_MSMT_CAP_CHARGE_DTM: NORMAL
MDC_IDC_MSMT_CAP_CHARGE_ENERGY: 18 J
MDC_IDC_MSMT_CAP_CHARGE_TIME: 3.65
MDC_IDC_MSMT_CAP_CHARGE_TYPE: NORMAL
MDC_IDC_MSMT_LEADCHNL_RV_IMPEDANCE_VALUE: 342 OHM
MDC_IDC_MSMT_LEADCHNL_RV_PACING_THRESHOLD_AMPLITUDE: 0.5 V
MDC_IDC_MSMT_LEADCHNL_RV_PACING_THRESHOLD_PULSEWIDTH: 0.4 MS
MDC_IDC_MSMT_LEADCHNL_RV_SENSING_INTR_AMPL: 7.38 MV
MDC_IDC_PG_IMPLANT_DTM: NORMAL
MDC_IDC_PG_MFG: NORMAL
MDC_IDC_PG_MODEL: NORMAL
MDC_IDC_PG_SERIAL: NORMAL
MDC_IDC_PG_TYPE: NORMAL
MDC_IDC_SESS_CLINIC_NAME: NORMAL
MDC_IDC_SESS_DTM: NORMAL
MDC_IDC_SESS_TYPE: NORMAL
MDC_IDC_SET_BRADY_HYSTRATE: NORMAL
MDC_IDC_SET_BRADY_LOWRATE: 40 {BEATS}/MIN
MDC_IDC_SET_BRADY_MODE: NORMAL
MDC_IDC_SET_LEADCHNL_RV_PACING_AMPLITUDE: 2 V
MDC_IDC_SET_LEADCHNL_RV_PACING_ANODE_ELECTRODE_1: NORMAL
MDC_IDC_SET_LEADCHNL_RV_PACING_ANODE_LOCATION_1: NORMAL
MDC_IDC_SET_LEADCHNL_RV_PACING_CAPTURE_MODE: NORMAL
MDC_IDC_SET_LEADCHNL_RV_PACING_CATHODE_ELECTRODE_1: NORMAL
MDC_IDC_SET_LEADCHNL_RV_PACING_CATHODE_LOCATION_1: NORMAL
MDC_IDC_SET_LEADCHNL_RV_PACING_POLARITY: NORMAL
MDC_IDC_SET_LEADCHNL_RV_PACING_PULSEWIDTH: 0.4 MS
MDC_IDC_SET_LEADCHNL_RV_SENSING_ANODE_ELECTRODE_1: NORMAL
MDC_IDC_SET_LEADCHNL_RV_SENSING_ANODE_LOCATION_1: NORMAL
MDC_IDC_SET_LEADCHNL_RV_SENSING_CATHODE_ELECTRODE_1: NORMAL
MDC_IDC_SET_LEADCHNL_RV_SENSING_CATHODE_LOCATION_1: NORMAL
MDC_IDC_SET_LEADCHNL_RV_SENSING_POLARITY: NORMAL
MDC_IDC_SET_LEADCHNL_RV_SENSING_SENSITIVITY: 0.3 MV
MDC_IDC_SET_ZONE_DETECTION_BEATS_DENOMINATOR: 40 {BEATS}
MDC_IDC_SET_ZONE_DETECTION_BEATS_NUMERATOR: 30 {BEATS}
MDC_IDC_SET_ZONE_DETECTION_INTERVAL: 300 MS
MDC_IDC_SET_ZONE_DETECTION_INTERVAL: 360 MS
MDC_IDC_SET_ZONE_DETECTION_INTERVAL: 390 MS
MDC_IDC_SET_ZONE_DETECTION_INTERVAL: NORMAL
MDC_IDC_SET_ZONE_TYPE: NORMAL
MDC_IDC_STAT_AT_BURDEN_PERCENT: 0 %
MDC_IDC_STAT_AT_DTM_END: NORMAL
MDC_IDC_STAT_AT_DTM_START: NORMAL
MDC_IDC_STAT_BRADY_DTM_END: NORMAL
MDC_IDC_STAT_BRADY_DTM_START: NORMAL
MDC_IDC_STAT_BRADY_RV_PERCENT_PACED: 0.02 %
MDC_IDC_STAT_EPISODE_RECENT_COUNT: 0
MDC_IDC_STAT_EPISODE_RECENT_COUNT_DTM_END: NORMAL
MDC_IDC_STAT_EPISODE_RECENT_COUNT_DTM_START: NORMAL
MDC_IDC_STAT_EPISODE_TOTAL_COUNT: 0
MDC_IDC_STAT_EPISODE_TOTAL_COUNT_DTM_END: NORMAL
MDC_IDC_STAT_EPISODE_TOTAL_COUNT_DTM_START: NORMAL
MDC_IDC_STAT_EPISODE_TYPE: NORMAL
MDC_IDC_STAT_TACHYTHERAPY_ATP_DELIVERED_RECENT: 0
MDC_IDC_STAT_TACHYTHERAPY_ATP_DELIVERED_TOTAL: 0
MDC_IDC_STAT_TACHYTHERAPY_RECENT_DTM_END: NORMAL
MDC_IDC_STAT_TACHYTHERAPY_RECENT_DTM_START: NORMAL
MDC_IDC_STAT_TACHYTHERAPY_SHOCKS_ABORTED_RECENT: 0
MDC_IDC_STAT_TACHYTHERAPY_SHOCKS_ABORTED_TOTAL: 0
MDC_IDC_STAT_TACHYTHERAPY_SHOCKS_DELIVERED_RECENT: 0
MDC_IDC_STAT_TACHYTHERAPY_SHOCKS_DELIVERED_TOTAL: 0
MDC_IDC_STAT_TACHYTHERAPY_TOTAL_DTM_END: NORMAL
MDC_IDC_STAT_TACHYTHERAPY_TOTAL_DTM_START: NORMAL

## 2022-03-28 DIAGNOSIS — I50.22 CHRONIC SYSTOLIC CONGESTIVE HEART FAILURE (H): Primary | ICD-10-CM

## 2022-04-04 ENCOUNTER — LAB (OUTPATIENT)
Dept: LAB | Facility: CLINIC | Age: 57
End: 2022-04-04
Attending: NURSE PRACTITIONER
Payer: COMMERCIAL

## 2022-04-04 ENCOUNTER — PATIENT OUTREACH (OUTPATIENT)
Dept: CARDIOLOGY | Facility: CLINIC | Age: 57
End: 2022-04-04

## 2022-04-04 ENCOUNTER — ANCILLARY PROCEDURE (OUTPATIENT)
Dept: CARDIOLOGY | Facility: CLINIC | Age: 57
End: 2022-04-04
Attending: INTERNAL MEDICINE
Payer: COMMERCIAL

## 2022-04-04 ENCOUNTER — OFFICE VISIT (OUTPATIENT)
Dept: CARDIOLOGY | Facility: CLINIC | Age: 57
End: 2022-04-04
Attending: NURSE PRACTITIONER
Payer: COMMERCIAL

## 2022-04-04 VITALS
WEIGHT: 136.1 LBS | OXYGEN SATURATION: 99 % | BODY MASS INDEX: 25.04 KG/M2 | HEIGHT: 62 IN | SYSTOLIC BLOOD PRESSURE: 135 MMHG | DIASTOLIC BLOOD PRESSURE: 85 MMHG | HEART RATE: 72 BPM

## 2022-04-04 DIAGNOSIS — E11.59 TYPE 2 DIABETES MELLITUS WITH OTHER CIRCULATORY COMPLICATION, WITHOUT LONG-TERM CURRENT USE OF INSULIN (H): Primary | ICD-10-CM

## 2022-04-04 DIAGNOSIS — I50.22 CHRONIC SYSTOLIC CONGESTIVE HEART FAILURE (H): ICD-10-CM

## 2022-04-04 DIAGNOSIS — E11.9 TYPE 2 DIABETES MELLITUS WITHOUT COMPLICATION, WITHOUT LONG-TERM CURRENT USE OF INSULIN (H): ICD-10-CM

## 2022-04-04 DIAGNOSIS — Z12.5 SCREENING FOR PROSTATE CANCER: ICD-10-CM

## 2022-04-04 DIAGNOSIS — I42.9 CARDIOMYOPATHY (H): ICD-10-CM

## 2022-04-04 DIAGNOSIS — I25.10 CORONARY ARTERY DISEASE INVOLVING NATIVE CORONARY ARTERY OF NATIVE HEART WITHOUT ANGINA PECTORIS: ICD-10-CM

## 2022-04-04 DIAGNOSIS — Z95.810 ICD (IMPLANTABLE CARDIOVERTER-DEFIBRILLATOR) IN PLACE: ICD-10-CM

## 2022-04-04 LAB
ANION GAP SERPL CALCULATED.3IONS-SCNC: 6 MMOL/L (ref 3–14)
BUN SERPL-MCNC: 28 MG/DL (ref 7–30)
CALCIUM SERPL-MCNC: 8 MG/DL (ref 8.5–10.1)
CHLORIDE BLD-SCNC: 110 MMOL/L (ref 94–109)
CO2 SERPL-SCNC: 27 MMOL/L (ref 20–32)
CREAT SERPL-MCNC: 1.2 MG/DL (ref 0.66–1.25)
GFR SERPL CREATININE-BSD FRML MDRD: 72 ML/MIN/1.73M2
GLUCOSE BLD-MCNC: 184 MG/DL (ref 70–99)
MDC_IDC_LEAD_IMPLANT_DT: NORMAL
MDC_IDC_LEAD_LOCATION: NORMAL
MDC_IDC_LEAD_LOCATION_DETAIL_1: NORMAL
MDC_IDC_LEAD_MFG: NORMAL
MDC_IDC_LEAD_MODEL: NORMAL
MDC_IDC_LEAD_POLARITY_TYPE: NORMAL
MDC_IDC_LEAD_SERIAL: NORMAL
MDC_IDC_LEAD_SPECIAL_FUNCTION: NORMAL
MDC_IDC_MSMT_BATTERY_DTM: NORMAL
MDC_IDC_MSMT_BATTERY_REMAINING_LONGEVITY: 98 MO
MDC_IDC_MSMT_BATTERY_RRT_TRIGGER: 2.73
MDC_IDC_MSMT_BATTERY_STATUS: NORMAL
MDC_IDC_MSMT_BATTERY_VOLTAGE: 3.01 V
MDC_IDC_MSMT_CAP_CHARGE_DTM: NORMAL
MDC_IDC_MSMT_CAP_CHARGE_ENERGY: 18 J
MDC_IDC_MSMT_CAP_CHARGE_TIME: 3.65
MDC_IDC_MSMT_CAP_CHARGE_TYPE: NORMAL
MDC_IDC_MSMT_LEADCHNL_RV_IMPEDANCE_VALUE: 304 OHM
MDC_IDC_MSMT_LEADCHNL_RV_IMPEDANCE_VALUE: 323 OHM
MDC_IDC_MSMT_LEADCHNL_RV_PACING_THRESHOLD_AMPLITUDE: 0.5 V
MDC_IDC_MSMT_LEADCHNL_RV_PACING_THRESHOLD_PULSEWIDTH: 0.4 MS
MDC_IDC_MSMT_LEADCHNL_RV_SENSING_INTR_AMPL: 7.75 MV
MDC_IDC_MSMT_LEADCHNL_RV_SENSING_INTR_AMPL: 8.12 MV
MDC_IDC_PG_IMPLANT_DTM: NORMAL
MDC_IDC_PG_MFG: NORMAL
MDC_IDC_PG_MODEL: NORMAL
MDC_IDC_PG_SERIAL: NORMAL
MDC_IDC_PG_TYPE: NORMAL
MDC_IDC_SESS_CLINIC_NAME: NORMAL
MDC_IDC_SESS_DTM: NORMAL
MDC_IDC_SESS_TYPE: NORMAL
MDC_IDC_SET_BRADY_HYSTRATE: NORMAL
MDC_IDC_SET_BRADY_LOWRATE: 40 {BEATS}/MIN
MDC_IDC_SET_BRADY_MODE: NORMAL
MDC_IDC_SET_LEADCHNL_RV_PACING_AMPLITUDE: 2 V
MDC_IDC_SET_LEADCHNL_RV_PACING_ANODE_ELECTRODE_1: NORMAL
MDC_IDC_SET_LEADCHNL_RV_PACING_ANODE_LOCATION_1: NORMAL
MDC_IDC_SET_LEADCHNL_RV_PACING_CAPTURE_MODE: NORMAL
MDC_IDC_SET_LEADCHNL_RV_PACING_CATHODE_ELECTRODE_1: NORMAL
MDC_IDC_SET_LEADCHNL_RV_PACING_CATHODE_LOCATION_1: NORMAL
MDC_IDC_SET_LEADCHNL_RV_PACING_POLARITY: NORMAL
MDC_IDC_SET_LEADCHNL_RV_PACING_PULSEWIDTH: 0.4 MS
MDC_IDC_SET_LEADCHNL_RV_SENSING_ANODE_ELECTRODE_1: NORMAL
MDC_IDC_SET_LEADCHNL_RV_SENSING_ANODE_LOCATION_1: NORMAL
MDC_IDC_SET_LEADCHNL_RV_SENSING_CATHODE_ELECTRODE_1: NORMAL
MDC_IDC_SET_LEADCHNL_RV_SENSING_CATHODE_LOCATION_1: NORMAL
MDC_IDC_SET_LEADCHNL_RV_SENSING_POLARITY: NORMAL
MDC_IDC_SET_LEADCHNL_RV_SENSING_SENSITIVITY: 0.3 MV
MDC_IDC_SET_ZONE_DETECTION_BEATS_DENOMINATOR: 40 {BEATS}
MDC_IDC_SET_ZONE_DETECTION_BEATS_NUMERATOR: 30 {BEATS}
MDC_IDC_SET_ZONE_DETECTION_INTERVAL: 300 MS
MDC_IDC_SET_ZONE_DETECTION_INTERVAL: 360 MS
MDC_IDC_SET_ZONE_DETECTION_INTERVAL: 390 MS
MDC_IDC_SET_ZONE_DETECTION_INTERVAL: NORMAL
MDC_IDC_SET_ZONE_TYPE: NORMAL
MDC_IDC_STAT_AT_BURDEN_PERCENT: 0 %
MDC_IDC_STAT_AT_DTM_END: NORMAL
MDC_IDC_STAT_AT_DTM_START: NORMAL
MDC_IDC_STAT_BRADY_DTM_END: NORMAL
MDC_IDC_STAT_BRADY_DTM_START: NORMAL
MDC_IDC_STAT_BRADY_RV_PERCENT_PACED: 0.02 %
MDC_IDC_STAT_EPISODE_RECENT_COUNT: 0
MDC_IDC_STAT_EPISODE_RECENT_COUNT_DTM_END: NORMAL
MDC_IDC_STAT_EPISODE_RECENT_COUNT_DTM_START: NORMAL
MDC_IDC_STAT_EPISODE_TOTAL_COUNT: 0
MDC_IDC_STAT_EPISODE_TOTAL_COUNT_DTM_END: NORMAL
MDC_IDC_STAT_EPISODE_TOTAL_COUNT_DTM_START: NORMAL
MDC_IDC_STAT_EPISODE_TYPE: NORMAL
MDC_IDC_STAT_TACHYTHERAPY_ATP_DELIVERED_RECENT: 0
MDC_IDC_STAT_TACHYTHERAPY_ATP_DELIVERED_TOTAL: 0
MDC_IDC_STAT_TACHYTHERAPY_RECENT_DTM_END: NORMAL
MDC_IDC_STAT_TACHYTHERAPY_RECENT_DTM_START: NORMAL
MDC_IDC_STAT_TACHYTHERAPY_SHOCKS_ABORTED_RECENT: 0
MDC_IDC_STAT_TACHYTHERAPY_SHOCKS_ABORTED_TOTAL: 0
MDC_IDC_STAT_TACHYTHERAPY_SHOCKS_DELIVERED_RECENT: 0
MDC_IDC_STAT_TACHYTHERAPY_SHOCKS_DELIVERED_TOTAL: 0
MDC_IDC_STAT_TACHYTHERAPY_TOTAL_DTM_END: NORMAL
MDC_IDC_STAT_TACHYTHERAPY_TOTAL_DTM_START: NORMAL
POTASSIUM BLD-SCNC: 4.6 MMOL/L (ref 3.4–5.3)
SODIUM SERPL-SCNC: 143 MMOL/L (ref 133–144)

## 2022-04-04 PROCEDURE — 36415 COLL VENOUS BLD VENIPUNCTURE: CPT | Performed by: PATHOLOGY

## 2022-04-04 PROCEDURE — 93282 PRGRMG EVAL IMPLANTABLE DFB: CPT | Performed by: INTERNAL MEDICINE

## 2022-04-04 PROCEDURE — G0103 PSA SCREENING: HCPCS | Performed by: PATHOLOGY

## 2022-04-04 PROCEDURE — 80053 COMPREHEN METABOLIC PANEL: CPT | Performed by: PATHOLOGY

## 2022-04-04 PROCEDURE — 80061 LIPID PANEL: CPT | Performed by: PATHOLOGY

## 2022-04-04 PROCEDURE — 99214 OFFICE O/P EST MOD 30 MIN: CPT | Mod: 25 | Performed by: NURSE PRACTITIONER

## 2022-04-04 PROCEDURE — 82248 BILIRUBIN DIRECT: CPT | Performed by: PATHOLOGY

## 2022-04-04 RX ORDER — DAPAGLIFLOZIN 10 MG/1
10 TABLET, FILM COATED ORAL DAILY
Qty: 30 TABLET | Refills: 2 | Status: SHIPPED | OUTPATIENT
Start: 2022-04-04 | End: 2022-06-23

## 2022-04-04 RX ORDER — METFORMIN HCL 500 MG
1000 TABLET, EXTENDED RELEASE 24 HR ORAL
Qty: 180 TABLET | Refills: 3 | Status: SHIPPED | OUTPATIENT
Start: 2022-04-04 | End: 2023-03-24

## 2022-04-04 ASSESSMENT — PAIN SCALES - GENERAL: PAINLEVEL: NO PAIN (0)

## 2022-04-04 NOTE — TELEPHONE ENCOUNTER
Called Luke with following recommendations. Shyanne reviewed diabetic regimen with Luke's PCP to discuss addition of Farxiga.     Date: 4/4/2022    Time of Call: 3:03 PM     Diagnosis:  HF     [ VORB ] Ordering provider: Shyanne Montalvo NP  Order: Start Farxiga 10 mg daily.      Order received by: Soraya Messina RN      Follow-up/additional notes: Also reviewed recommendations from PCP that he should continue Metformin and STOP Glipizide. Luke reports he's actually been off the glipizide for about 6 months. Message sent to PCP with update.

## 2022-04-04 NOTE — NURSING NOTE
Jardiance sent to pharmacy and called to check on co-pay. Is covered at 100%, no co-pay. Asked them to hold off on filling until can discuss with PCP. Updated provider.

## 2022-04-04 NOTE — LETTER
4/4/2022      RE: Luke Henao  [address]  [place] [address]       Dear Colleague,    Thank you for the opportunity to participate in the care of your patient, Luke Henao, at the [facility] [place] at [facility]. Please see a copy of my visit note below.    HPI:   Mr. 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 He presents to [facility] for routine follow up.     He notes fatigue with Entresto prompting a reduction. He denies fever, chills, lightheadedness, dizziness, chest pain, palpitations, SOB, RAYMNOD, PND, orthopnea, nausea, vomiting, diarrhea, or LE edema. His weight remains stable between 132-136 lbs. He continues to follow a low sodium diet. Home BG running 's.     PAST MEDICAL HISTORY:  Past Medical History:   Diagnosis Date     Benign essential hypertension      CAD (coronary artery disease) 2017    RCA STEMI s/p balloon angioplasty and multiple Sofei to RCA, LCx, and LAD     History of deep venous thrombosis 2017    left internal jugular (line-associated); left common femoral; was on coumadin     History of stroke 2017    no residual deficits     Hyperlipidemia      Ischemic cardiomyopathy 2017    EF 30-35%     Myocardial infarction (H)      Type 2 diabetes mellitus (H)        FAMILY HISTORY:  Family History   Problem Relation Age of Onset     Hypertension Mother      Diabetes Type 2  Father      Hypertension Father      No Known Problems Brother      No Known Problems Sister      No Known Problems Son      No Known Problems Daughter      No Known Problems Sister      No Known Problems Sister      No Known Problems Sister      No Known  Problems Sister      No Known Problems Brother      No Known Problems Son      No Known Problems Daughter      Myocardial Infarction No family hx of      Cerebrovascular Disease No family hx of      Coronary Artery Disease Early Onset No family hx of      Colon Cancer No family hx of      Prostate Cancer No family hx of        SOCIAL HISTORY:  Social History     Socioeconomic History     Marital status:      Spouse name: Not on file     Number of children: Not on file     Years of education: Not on file     Highest education level: Not on file   Occupational History     Occupation: [employer]   Tobacco Use     Smoking status: Former Smoker     Packs/day: 0.50     Years: 30.00     Pack years: 15.00     Types: Cigarettes     Quit date: 2017     Years since quittin.2     Smokeless tobacco: Never Used   Substance and Sexual Activity     Alcohol use: No     Drug use: No     Sexual activity: Not Currently   Other Topics Concern     Parent/sibling w/ CABG, MI or angioplasty before 65F 55M? Not Asked   Social History Narrative    . Remarried.    4 children with first marriage.    2 grand children.    No formal exercise.      Social Determinants of Health     Financial Resource Strain: Not on file   Food Insecurity: Not on file   Transportation Needs: Not on file   Physical Activity: Not on file   Stress: Not on file   Social Connections: Not on file   Intimate Partner Violence: Not on file   Housing Stability: Not on file       CURRENT MEDICATIONS:  Outpatient Medications Prior to Visit   Medication Sig Dispense Refill     aspirin (ASPIRIN LOW DOSE) 81 MG chewable tablet Take 1 tablet (81 mg) by mouth daily 90 tablet 0     atorvastatin (LIPITOR) 40 MG tablet Take 1 tablet (40 mg) by mouth daily 90 tablet 3     bumetanide (BUMEX) 0.5 MG tablet TAKE 1 TABLET BY MOUTH EVERY DAY 90 tablet 0     cetirizine (ZYRTEC) 10 MG tablet TAKE 1 TABLET (10 MG) BY MOUTH DAILY 90 tablet 1     co-enzyme Q-10  "100 MG CAPS capsule Take by mouth daily       metFORMIN (GLUCOPHAGE-XR) 500 MG 24 hr tablet Take 2 tablets (1,000 mg) by mouth daily (with dinner) 60 tablet 0     metoprolol succinate ER (TOPROL-XL) 200 MG 24 hr tablet Take 1 tablet (200 mg) by mouth daily 90 tablet 0     ONETOUCH ULTRA test strip USE TO TEST BLOOD SUGAR 3 TIMES DAILY OR AS DIRECTED. 100 strip 3     sacubitril-valsartan (ENTRESTO) 49-51 MG per tablet 1 pill in the morning and 2 pills in the evening 270 tablet 3     fluticasone (FLONASE) 50 MCG/ACT nasal spray INSTILL 2 SPRAYS INTO BOTH NOSTRILS DAILY (Patient not taking: Reported on 4/4/2022) 48 mL 3     glipiZIDE (GLUCOTROL) 5 MG tablet Take 1 tablet (5 mg) by mouth 2 times daily (before meals) - further refills must be obtained from PCP or endocrinologist (Patient not taking: Reported on 11/1/2021) 60 tablet 0     olopatadine (PATANOL) 0.1 % ophthalmic solution Place 1 drop into both eyes 2 times daily (Patient not taking: Reported on 9/22/2021) 15 mL 1     No facility-administered medications prior to visit.       ROS:   CONSTITUTIONAL: Denies fever, chills, fatigue, or weight fluctuations.   HEENT: Denies headache, vision changes, and changes in speech.   CV: Refer to HPI.   PULMONARY:Denies shortness of breath, cough, or previous TB exposure.   GI:Denies nausea, vomiting, diarrhea, and abdominal pain. Bowel movements are regular.   :Denies urinary alterations, dysuria, urinary frequency, hematuria, and abnormal drainage.   EXT:Denies lower extremity edema.   SKIN:Denies abnormal rashes or lesions.   MUSCULOSKELETAL:Denies upper or lower extremity weakness and pain.   NEUROLOGIC:Denies lightheadedness, dizziness, seizures, or upper or lower extremity paresthesia.     EXAM:  BP (!) 155/98 (BP Location: Left arm, Patient Position: Chair, Cuff Size: Adult Regular)   Pulse 72   Ht 1.569 m (5' 1.77\")   Wt 61.7 kg (136 lb 1.6 oz)   SpO2 99%   BMI 25.08 kg/m    GENERAL: Appears alert and " oriented times three.   HEENT: Eye symmetrical and free of discharge bilaterally. Mucous membranes moist and without lesions.  NECK: Supple and without lymphadenopathy. JVD at clavicular line.   CV: RRR, S1S2 present without murmur, rub, or gallop.   RESPIRATORY: Respirations regular, even, and unlabored. Lungs CTA throughout.   GI: Soft and non distended with normoactive bowel sounds present in all quadrants. No tenderness, rebound, guarding. No organomegaly.   EXTREMITIES: No peripheral edema. 2+ bilateral pedal pulses.   NEUROLOGIC: Alert and orientated x 3. CN II-XII grossly intact. No focal deficits.   MUSCULOSKELETAL: No joint swelling or tenderness.   SKIN: No jaundice. No rashes or lesions.     The following Labs were reviewed today:  CBC RESULTS:  Lab Results   Component Value Date    WBC 8.8 05/19/2021    RBC 5.23 05/19/2021    HGB 15.5 05/19/2021    HCT 48.5 05/19/2021    MCV 93 05/19/2021    MCH 29.6 05/19/2021    MCHC 32.0 05/19/2021    RDW 15.3 (H) 05/19/2021     05/19/2021       CMP RESULTS:  Lab Results   Component Value Date     04/04/2022     05/19/2021    POTASSIUM 4.6 04/04/2022    POTASSIUM 4.9 05/19/2021    CHLORIDE 110 (H) 04/04/2022    CHLORIDE 106 05/19/2021    CO2 27 04/04/2022    CO2 33 (H) 05/19/2021    ANIONGAP 6 04/04/2022    ANIONGAP <1 (L) 05/19/2021     (H) 04/04/2022     (H) 05/19/2021    BUN 28 04/04/2022    BUN 24 05/19/2021    CR 1.20 04/04/2022    CR 1.18 05/19/2021    GFRESTIMATED 72 04/04/2022    GFRESTIMATED 70 05/19/2021    GFRESTBLACK 81 05/19/2021    ROB 8.0 (L) 04/04/2022    ROB 8.9 05/19/2021    BILITOTAL 2.0 (H) 05/19/2021    ALBUMIN 3.6 05/19/2021    ALKPHOS 75 05/19/2021    ALT 35 05/19/2021    AST 28 05/19/2021        INR RESULTS:  Lab Results   Component Value Date    INR 2.3 (A) 12/26/2017    INR 2.36 (H) 11/03/2017       Lab Results   Component Value Date    MAG 2.4 (H) 06/23/2017     Lab Results   Component Value Date    NTBNPI  9,984 (H) 04/08/2017     Lab Results   Component Value Date    NTBNP 1,032 (H) 01/19/2021       Echo 1/19/21:   Interpretation Summary  Mild left ventricular dilation is present. Severely (EF 15-20%) reduced left  ventricular function is present. Diffuse severe hypokinesis present. Basal and  mid Inferior and inferolateral akinesis present     Global right ventricular function is mildly to moderately reduced.     Post MitraClip placement in 2017. Mild mitral insufficiency is present. Mean  gradient 2.5 mmHg at 69 bpm.     Pulmonary hypertension is present. Estimated pulmonary artery systolic  pressure is 48 mmHg.     The inferior vena cava was normal in size with preserved respiratory  variability.     No pericardial effusion is present.     Assessment and Plan:   Mr. 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 He presents to [facility] for routine follow up and is doing well.      Chronic systolic heart failure secondary to ICM.    Stage C, NYHA Class II  ACEi/ARB Entresto 49/51 mg po BID, intolerant to prior increase  BB Increase Toprol  mg po daily   Aldosterone antagonist contraindicated due to transient renal function   SCD prophylaxis ICD.   Fluid status Euvolemic. Bumex 0.5 mg po daily.   - Offered Cardiac rehab, deferred due to time limitations with work.   - Consider initiation of Farxiga, script sent to pharmacy for coverage.      CAD. S/p POBA RCA, FEMI to RCA times 7, LCx, and LAD 3/27/17 with refractory cardiogenic shock s/p IABP transitioned to subclavian balloon pump inpatient.   - ASA, Statin, BB, and Plavix.      Left IJ DVT. Provoked event, present on US 4/29/17, but nonocclusive. Initial diagnosis  4/23/17. Completed 3 months of Coumadin.   - Continue ASA      CVA. Bilateral hemispheric infarcts secondary to watershed ischemia. Per Neurology no indication for long term anticoagulation from their perspective.   - Continue ASA and Lipitor.      MR s/p Mitral clip. Implanted 5/1/17. Secondary to Ischemic MR. Echo 1/21 consistent with mild mitral insufficiency is present. Mean gradient 2.5 mmHg at 69 bpm.    DM Type II.   - Case discussed with PCP Dr. Church. Discontinue Glipizide.   - Farxiga 10 mg po daily initiated.   - Dr. Church will send prescription for Metformin to pharmacy for pt.     Follow up in [facility] in 6 months.     RUBI Stanley CNP  4/3/2022          Please do not hesitate to contact me if you have any questions/concerns.     Sincerely,     RUBI Stanley CNP

## 2022-04-04 NOTE — PROGRESS NOTES
HPI:   Mr. 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 He presents to [facility] clinic for routine follow up.     He notes fatigue with Entresto prompting a reduction. He denies fever, chills, lightheadedness, dizziness, chest pain, palpitations, SOB, RAYMOND, PND, orthopnea, nausea, vomiting, diarrhea, or LE edema. His weight remains stable between 132-136 lbs. He continues to follow a low sodium diet. Home BG running 's.     PAST MEDICAL HISTORY:  Past Medical History:   Diagnosis Date     Benign essential hypertension      CAD (coronary artery disease) 2017    RCA STEMI s/p balloon angioplasty and multiple Sofie to RCA, LCx, and LAD     History of deep venous thrombosis 2017    left internal jugular (line-associated); left common femoral; was on coumadin     History of stroke 2017    no residual deficits     Hyperlipidemia      Ischemic cardiomyopathy 2017    EF 30-35%     Myocardial infarction (H)      Type 2 diabetes mellitus (H)        FAMILY HISTORY:  Family History   Problem Relation Age of Onset     Hypertension Mother      Diabetes Type 2  Father      Hypertension Father      No Known Problems Brother      No Known Problems Sister      No Known Problems Son      No Known Problems Daughter      No Known Problems Sister      No Known Problems Sister      No Known Problems Sister      No Known Problems Sister      No Known Problems Brother      No Known Problems Son      No Known Problems Daughter      Myocardial Infarction No family hx of      Cerebrovascular Disease No family hx of      Coronary Artery Disease Early Onset No family hx of      Colon Cancer No family hx of      Prostate Cancer No family hx of        SOCIAL  HISTORY:  Social History     Socioeconomic History     Marital status:      Spouse name: Not on file     Number of children: Not on file     Years of education: Not on file     Highest education level: Not on file   Occupational History     Occupation: Hearing [employer]   Tobacco Use     Smoking status: Former Smoker     Packs/day: 0.50     Years: 30.00     Pack years: 15.00     Types: Cigarettes     Quit date: 2017     Years since quittin.2     Smokeless tobacco: Never Used   Substance and Sexual Activity     Alcohol use: No     Drug use: No     Sexual activity: Not Currently   Other Topics Concern     Parent/sibling w/ CABG, MI or angioplasty before 65F 55M? Not Asked   Social History Narrative    . Remarried.    4 children with first marriage.    2 grand children.    No formal exercise.      Social Determinants of Health     Financial Resource Strain: Not on file   Food Insecurity: Not on file   Transportation Needs: Not on file   Physical Activity: Not on file   Stress: Not on file   Social Connections: Not on file   Intimate Partner Violence: Not on file   Housing Stability: Not on file       CURRENT MEDICATIONS:  Outpatient Medications Prior to Visit   Medication Sig Dispense Refill     aspirin (ASPIRIN LOW DOSE) 81 MG chewable tablet Take 1 tablet (81 mg) by mouth daily 90 tablet 0     atorvastatin (LIPITOR) 40 MG tablet Take 1 tablet (40 mg) by mouth daily 90 tablet 3     bumetanide (BUMEX) 0.5 MG tablet TAKE 1 TABLET BY MOUTH EVERY DAY 90 tablet 0     cetirizine (ZYRTEC) 10 MG tablet TAKE 1 TABLET (10 MG) BY MOUTH DAILY 90 tablet 1     co-enzyme Q-10 100 MG CAPS capsule Take by mouth daily       metFORMIN (GLUCOPHAGE-XR) 500 MG 24 hr tablet Take 2 tablets (1,000 mg) by mouth daily (with dinner) 60 tablet 0     metoprolol succinate ER (TOPROL-XL) 200 MG 24 hr tablet Take 1 tablet (200 mg) by mouth daily 90 tablet 0     ONETOUCH ULTRA test strip USE TO TEST BLOOD SUGAR 3 TIMES DAILY  "OR AS DIRECTED. 100 strip 3     sacubitril-valsartan (ENTRESTO) 49-51 MG per tablet 1 pill in the morning and 2 pills in the evening 270 tablet 3     fluticasone (FLONASE) 50 MCG/ACT nasal spray INSTILL 2 SPRAYS INTO BOTH NOSTRILS DAILY (Patient not taking: Reported on 4/4/2022) 48 mL 3     glipiZIDE (GLUCOTROL) 5 MG tablet Take 1 tablet (5 mg) by mouth 2 times daily (before meals) - further refills must be obtained from PCP or endocrinologist (Patient not taking: Reported on 11/1/2021) 60 tablet 0     olopatadine (PATANOL) 0.1 % ophthalmic solution Place 1 drop into both eyes 2 times daily (Patient not taking: Reported on 9/22/2021) 15 mL 1     No facility-administered medications prior to visit.       ROS:   CONSTITUTIONAL: Denies fever, chills, fatigue, or weight fluctuations.   HEENT: Denies headache, vision changes, and changes in speech.   CV: Refer to HPI.   PULMONARY:Denies shortness of breath, cough, or previous TB exposure.   GI:Denies nausea, vomiting, diarrhea, and abdominal pain. Bowel movements are regular.   :Denies urinary alterations, dysuria, urinary frequency, hematuria, and abnormal drainage.   EXT:Denies lower extremity edema.   SKIN:Denies abnormal rashes or lesions.   MUSCULOSKELETAL:Denies upper or lower extremity weakness and pain.   NEUROLOGIC:Denies lightheadedness, dizziness, seizures, or upper or lower extremity paresthesia.     EXAM:  BP (!) 155/98 (BP Location: Left arm, Patient Position: Chair, Cuff Size: Adult Regular)   Pulse 72   Ht 1.569 m (5' 1.77\")   Wt 61.7 kg (136 lb 1.6 oz)   SpO2 99%   BMI 25.08 kg/m    GENERAL: Appears alert and oriented times three.   HEENT: Eye symmetrical and free of discharge bilaterally. Mucous membranes moist and without lesions.  NECK: Supple and without lymphadenopathy. JVD at clavicular line.   CV: RRR, S1S2 present without murmur, rub, or gallop.   RESPIRATORY: Respirations regular, even, and unlabored. Lungs CTA throughout.   GI: Soft and " non distended with normoactive bowel sounds present in all quadrants. No tenderness, rebound, guarding. No organomegaly.   EXTREMITIES: No peripheral edema. 2+ bilateral pedal pulses.   NEUROLOGIC: Alert and orientated x 3. CN II-XII grossly intact. No focal deficits.   MUSCULOSKELETAL: No joint swelling or tenderness.   SKIN: No jaundice. No rashes or lesions.     The following Labs were reviewed today:  CBC RESULTS:  Lab Results   Component Value Date    WBC 8.8 05/19/2021    RBC 5.23 05/19/2021    HGB 15.5 05/19/2021    HCT 48.5 05/19/2021    MCV 93 05/19/2021    MCH 29.6 05/19/2021    MCHC 32.0 05/19/2021    RDW 15.3 (H) 05/19/2021     05/19/2021       CMP RESULTS:  Lab Results   Component Value Date     04/04/2022     05/19/2021    POTASSIUM 4.6 04/04/2022    POTASSIUM 4.9 05/19/2021    CHLORIDE 110 (H) 04/04/2022    CHLORIDE 106 05/19/2021    CO2 27 04/04/2022    CO2 33 (H) 05/19/2021    ANIONGAP 6 04/04/2022    ANIONGAP <1 (L) 05/19/2021     (H) 04/04/2022     (H) 05/19/2021    BUN 28 04/04/2022    BUN 24 05/19/2021    CR 1.20 04/04/2022    CR 1.18 05/19/2021    GFRESTIMATED 72 04/04/2022    GFRESTIMATED 70 05/19/2021    GFRESTBLACK 81 05/19/2021    ROB 8.0 (L) 04/04/2022    ROB 8.9 05/19/2021    BILITOTAL 2.0 (H) 05/19/2021    ALBUMIN 3.6 05/19/2021    ALKPHOS 75 05/19/2021    ALT 35 05/19/2021    AST 28 05/19/2021        INR RESULTS:  Lab Results   Component Value Date    INR 2.3 (A) 12/26/2017    INR 2.36 (H) 11/03/2017       Lab Results   Component Value Date    MAG 2.4 (H) 06/23/2017     Lab Results   Component Value Date    NTBNPI 9,984 (H) 04/08/2017     Lab Results   Component Value Date    NTBNP 1,032 (H) 01/19/2021       Echo 1/19/21:   Interpretation Summary  Mild left ventricular dilation is present. Severely (EF 15-20%) reduced left  ventricular function is present. Diffuse severe hypokinesis present. Basal and  mid Inferior and inferolateral akinesis  present     Global right ventricular function is mildly to moderately reduced.     Post MitraClip placement in 2017. Mild mitral insufficiency is present. Mean  gradient 2.5 mmHg at 69 bpm.     Pulmonary hypertension is present. Estimated pulmonary artery systolic  pressure is 48 mmHg.     The inferior vena cava was normal in size with preserved respiratory  variability.     No pericardial effusion is present.     Assessment and Plan:   Mr. 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 He presents to [facility] clinic for routine follow up and is doing well.      Chronic systolic heart failure secondary to ICM.    Stage C, NYHA Class II  ACEi/ARB Entresto 49/51 mg po BID, intolerant to prior increase  BB Increase Toprol  mg po daily   Aldosterone antagonist contraindicated due to transient renal function   SCD prophylaxis ICD.   Fluid status Euvolemic. Bumex 0.5 mg po daily.   - Offered Cardiac rehab, deferred due to time limitations with work.   - Consider initiation of Farxiga, script sent to pharmacy for coverage.      CAD. S/p POBA RCA, FEMI to RCA times 7, LCx, and LAD 3/27/17 with refractory cardiogenic shock s/p IABP transitioned to subclavian balloon pump inpatient.   - ASA, Statin, BB, and Plavix.      Left IJ DVT. Provoked event, present on US 4/29/17, but nonocclusive. Initial diagnosis 4/23/17. Completed 3 months of Coumadin.   - Continue ASA      CVA. Bilateral hemispheric infarcts secondary to watershed ischemia. Per Neurology no indication for long term anticoagulation from their perspective.   - Continue ASA and Lipitor.      MR s/p Mitral clip. Implanted 5/1/17. Secondary to Ischemic MR. Echo 1/21 consistent with  mild mitral insufficiency is present. Mean gradient 2.5 mmHg at 69 bpm.    DM Type II.   - Case discussed with PCP Dr. Church. Discontinue Glipizide.   - Farxiga 10 mg po daily initiated.   - Dr. Church will send prescription for Metformin to pharmacy for pt.     Follow up in CORE in 6 months.     RUBI Stanley CNP  4/3/2022            KALIN OSORIO

## 2022-04-04 NOTE — PATIENT INSTRUCTIONS
It was a pleasure to see you in clinic today.  Please do not hesitate to call with any questions or concerns.  You are scheduled for a remote transmission on 7/12/22, 10/17/22 and 1/19/23.  We look forward to seeing you in clinic at your next device check in 1 year.    Sade Artis, RN, MS, CCRN  Electrophysiology Nurse Clinician  [facility]    During Business Hours Please Call:  [phone]  After Hours Please Call:  [phone] - select option #4 and ask for job code [phone]

## 2022-04-04 NOTE — PATIENT INSTRUCTIONS
Take your medicines every day, as directed    Changes made today:  o No medication changes  o We will call regarding Farxiga later today    Monitor Your Weight and Symptoms    Contact us if you:      Gain 2 pounds in one day or 5 pounds in one week    Feel more short of breath    Notice more leg swelling    Feel lightheadeded   Change your lifestyle    Limit Salt or Sodium:    2000 mg  Limit Fluids:    2000 mL or approximately 64 ounces  Eat a Heart Healthy Diet    Low in saturated fats  Stay Active:    Aim to move at least 150 minutes every  week         To Contact us    During Business Hours:  [phone], option # 1      After hours, weekends or holidays:   [phone], Option #4  Ask to speak to the On-Call Cardiologist. Inform them you are a CORE/heart failure patient at the [facility].     Use Spindle allows you to communicate directly with your heart team through secure messaging.    [employer] water systems can be accessed any time on your phone, computer, or tablet.    If you need assistance, we'd be happy to help!         Keep your Heart Appointments:    Follow up in CORE in 6 months

## 2022-04-04 NOTE — NURSING NOTE
Chief Complaint   Patient presents with     Follow Up      56 year old male with chronic systolic heart failure presents for follow up with labs and device check prior     Vitals were taken and medications reconciled.    Alexx Linda, EMT  10:12 AM

## 2022-04-07 DIAGNOSIS — E11.9 TYPE 2 DIABETES MELLITUS WITHOUT COMPLICATION, WITHOUT LONG-TERM CURRENT USE OF INSULIN (H): ICD-10-CM

## 2022-04-07 DIAGNOSIS — Z12.5 SCREENING FOR PROSTATE CANCER: ICD-10-CM

## 2022-04-07 DIAGNOSIS — I25.10 CORONARY ARTERY DISEASE INVOLVING NATIVE CORONARY ARTERY OF NATIVE HEART WITHOUT ANGINA PECTORIS: Primary | ICD-10-CM

## 2022-04-07 PROBLEM — I25.5 ISCHEMIC CARDIOMYOPATHY: Status: RESOLVED | Noted: 2017-01-01 | Resolved: 2022-04-07

## 2022-04-07 PROBLEM — E78.2 MIXED HYPERLIPIDEMIA: Status: RESOLVED | Noted: 2017-06-06 | Resolved: 2022-04-07

## 2022-04-07 PROBLEM — I50.22 CHRONIC SYSTOLIC CONGESTIVE HEART FAILURE (H): Status: RESOLVED | Noted: 2017-08-30 | Resolved: 2022-04-07

## 2022-04-07 PROBLEM — I50.22 CHRONIC SYSTOLIC CONGESTIVE HEART FAILURE (H): Status: ACTIVE | Noted: 2022-04-07

## 2022-04-07 PROBLEM — Z95.5 S/P CORONARY ARTERY STENT PLACEMENT: Status: RESOLVED | Noted: 2018-03-04 | Resolved: 2022-04-07

## 2022-04-07 PROBLEM — Z79.01 LONG TERM CURRENT USE OF ANTICOAGULANT THERAPY: Status: RESOLVED | Noted: 2017-10-03 | Resolved: 2022-04-07

## 2022-04-07 PROBLEM — Z98.890 POST-OPERATIVE STATE: Status: RESOLVED | Noted: 2017-10-27 | Resolved: 2022-04-07

## 2022-04-07 LAB
ALBUMIN SERPL-MCNC: 4 G/DL (ref 3.4–5)
ALP SERPL-CCNC: 64 U/L (ref 40–150)
ALT SERPL W P-5'-P-CCNC: 46 U/L (ref 0–70)
AST SERPL W P-5'-P-CCNC: 38 U/L (ref 0–45)
BILIRUB DIRECT SERPL-MCNC: 0.1 MG/DL (ref 0–0.2)
BILIRUB SERPL-MCNC: 0.4 MG/DL (ref 0.2–1.3)
CHOLEST SERPL-MCNC: 162 MG/DL
HDLC SERPL-MCNC: 43 MG/DL
LDLC SERPL CALC-MCNC: 86 MG/DL
NONHDLC SERPL-MCNC: 119 MG/DL
PROT SERPL-MCNC: 8 G/DL (ref 6.8–8.8)
PSA SERPL-MCNC: 0.5 UG/L (ref 0–4)
TRIGL SERPL-MCNC: 166 MG/DL

## 2022-04-08 ENCOUNTER — OFFICE VISIT (OUTPATIENT)
Dept: INTERNAL MEDICINE | Facility: CLINIC | Age: 57
End: 2022-04-08
Payer: COMMERCIAL

## 2022-04-08 VITALS
OXYGEN SATURATION: 99 % | SYSTOLIC BLOOD PRESSURE: 128 MMHG | WEIGHT: 136 LBS | HEART RATE: 70 BPM | BODY MASS INDEX: 25.06 KG/M2 | RESPIRATION RATE: 16 BRPM | TEMPERATURE: 97.2 F | DIASTOLIC BLOOD PRESSURE: 88 MMHG

## 2022-04-08 DIAGNOSIS — E11.9 TYPE 2 DIABETES MELLITUS WITHOUT COMPLICATION, WITHOUT LONG-TERM CURRENT USE OF INSULIN (H): ICD-10-CM

## 2022-04-08 DIAGNOSIS — Z00.00 ROUTINE HISTORY AND PHYSICAL EXAMINATION OF ADULT: Primary | ICD-10-CM

## 2022-04-08 LAB — HBA1C MFR BLD: 7.1 % (ref 0–5.6)

## 2022-04-08 PROCEDURE — 99396 PREV VISIT EST AGE 40-64: CPT | Performed by: INTERNAL MEDICINE

## 2022-04-08 PROCEDURE — 36416 COLLJ CAPILLARY BLOOD SPEC: CPT | Performed by: INTERNAL MEDICINE

## 2022-04-08 PROCEDURE — 83036 HEMOGLOBIN GLYCOSYLATED A1C: CPT | Performed by: INTERNAL MEDICINE

## 2022-04-08 PROCEDURE — 99207 PR FOOT EXAM NO CHARGE: CPT | Mod: 25 | Performed by: INTERNAL MEDICINE

## 2022-04-08 NOTE — PROGRESS NOTES
ASSESSMENT/PLAN                                                       (Z00.00) Routine history and physical examination of adult  (primary encounter diagnosis)  Comment: PMH, PSH, FH, SH, medications, allergies, immunizations, and preventative health measures reviewed and updated as appropriate.  Plan: see below for plans.      (E11.9) Type 2 diabetes mellitus without complication, without long-term current use of insulin (H)  Plan: HgbA1c today; diabetes follow-up in 6 months.     Shanna Church MD   [address] [address]  T: [phone], F: [phone]    YAMILE Henao is a very pleasant 56 year old male who presents for a physical.    ROS:  Constitutional: no unintentional weight loss or gain reported; no fevers, chills, or sweats  reported    Cardiovascular: no chest pain, palpitations, or edema reported  Respiratory: no cough, wheezing, shortness of breath, or dyspnea on exertion reported  Gastrointestinal: no nausea, vomiting, constipation, diarrhea, or abdominal pain reported  Genitourinary: no urinary frequency, urgency, dysuria, or hematuria reported  Integumentary: no rash or pruritus reported  Musculoskeletal: no back pain, muscle pain, joint pain, or joint swelling reported  Neurologic: no focal weakness, numbness, or tingling reported  Hematologic: no easy bruising or bleeding reported  Endocrine: no heat or cold intolerance reported; no polyuria or polydipsia reported  Psychiatric: no anxiety or depression reported    Past Medical History:   Diagnosis Date     Benign essential hypertension      CAD (coronary artery disease) 2017    RCA STEMI s/p balloon angioplasty and multiple Sofie to RCA, LCx, and LAD     Chronic systolic congestive heart failure (H)      History of deep venous thrombosis 2017    left internal jugular (line-associated); left common femoral; was on coumadin     History of  mitral valve repair      History of stroke     no residual deficits     ICD (implantable cardioverter-defibrillator) in place      Type 2 diabetes mellitus (H)      Past Surgical History:   Procedure Laterality Date     CATARACT EXTRACTION, BILATERAL       COLONOSCOPY N/A 2017    Procedure: COLONOSCOPY;  Surgeon: Rashaad Bundy MD;  Location: [facility]     INSERT INTRAAORTIC BALLOON PUMP Right 2017    Procedure: INSERT INTRAAORTIC BALLOON PUMP;  Right Subclavian Intra Aortic Balloon Pump Insertion using Maquet 40cc Ballon Catheter, Implentation of 8mm Gelweave Woven Vascular Prosthesis, Removal of Left Femoral Ballon Pump Catheter, Flouroscopy;  Surgeon: Keshav Leung MD;  Location: [facility] OR     PERCUTANEOUS MITRAL VALVE REPAIR N/A 2017    Procedure: PERCUTANEOUS MITRAL VALVE REPAIR ANESTHESIA;  Mitraclip Procedure Possible Cardiopulmonary Bypass ;  Surgeon: Ron Cortez MD;  Location: [facility] OR     SUBCLAVIAN AORTIC VALVE IMPLANT N/A 2017    Procedure: SUBCLAVIAN AORTIC VALVE IMPLANT;  Right Subclavian Graft Removal ;  Surgeon: Keshav Leung MD;  Location: [facility] OR     ZZC INSERT ELECTRD LEADS/REPOSTION  10/27/2017          Family History   Problem Relation Age of Onset     Hypertension Mother      Diabetes Type 2  Father      Hypertension Father      Myocardial Infarction No family hx of      Cerebrovascular Disease No family hx of      Coronary Artery Disease Early Onset No family hx of      Colon Cancer No family hx of      Prostate Cancer No family hx of      Social History     Occupational History     Occupation: Hearing [employer]   Tobacco Use     Smoking status: Former Smoker     Packs/day: 0.50     Years: 30.00     Pack years: 15.00     Types: Cigarettes     Quit date: 2017     Years since quittin.2     Smokeless tobacco: Never Used   Vaping Use     Vaping Use: Never used   Substance and Sexual Activity     Alcohol use: No     Drug use:  No     Sexual activity: Not Currently   Social History Narrative    . Remarried.    4 children with first marriage.    3 grand children.    No formal exercise.      No Known Allergies     Current Outpatient Medications   Medication Sig     aspirin (ASPIRIN LOW DOSE) 81 MG chewable tablet Take 1 tablet (81 mg) by mouth daily     atorvastatin (LIPITOR) 40 MG tablet Take 1 tablet (40 mg) by mouth daily     bumetanide (BUMEX) 0.5 MG tablet TAKE 1 TABLET BY MOUTH EVERY DAY     cetirizine (ZYRTEC) 10 MG tablet TAKE 1 TABLET (10 MG) BY MOUTH DAILY     co-enzyme Q-10 100 MG CAPS capsule Take by mouth daily     dapagliflozin (FARXIGA) 10 MG TABS tablet Take 1 tablet (10 mg) by mouth daily     metFORMIN (GLUCOPHAGE-XR) 500 MG 24 hr tablet Take 2 tablets (1,000 mg) by mouth daily (with dinner)     metoprolol succinate ER (TOPROL-XL) 200 MG 24 hr tablet Take 1 tablet (200 mg) by mouth daily     ONETOUCH ULTRA test strip USE TO TEST BLOOD SUGAR 3 TIMES DAILY OR AS DIRECTED.     sacubitril-valsartan (ENTRESTO) 49-51 MG per tablet 1 pill in the morning and 2 pills in the evening     Immunization History   Administered Date(s) Administered     COVID-19,PF,Moderna 05/04/2021, 06/01/2021, 12/29/2021     Influenza Quad, Recombinant, pf(RIV4) (Flublok) 10/12/2020     Influenza Vaccine, 6+MO IM (QUADRIVALENT W/PRESERVATIVES) 10/01/2018     Pneumococcal 23 valent 05/14/2017     TDAP Vaccine (Adacel) 09/26/2017     PREVENTATIVE HEALTH                                                      BMI: overweight  Blood pressure: well-controlled on current regimen   Prostate CA Screening: up to date   Colon CA screening: up to date   Lung CA screening: patient does not meet screening criteria  AAA screening: not medically indicated at this time   Screening cholesterol: n/a - already being treated for this condition  Screening diabetes: n/a - already being treated for this condition  STD testing: no risk factors present  Alcohol misuse  screening: alcohol use reviewed - no intervention indicated at this time  Immunizations: reviewed; COVID-19 booster #2 and Shingrix series DUE - patient declines    OBJECTIVE                                                      /88 (BP Location: Left arm, Patient Position: Chair, Cuff Size: Adult Regular)   Pulse 70   Temp 97.2  F (36.2  C) (Temporal)   Resp 16   Wt 61.7 kg (136 lb)   SpO2 99%   BMI 25.06 kg/m    Constitutional: well-appearing  Head, Ears, and Eyes: normocephalic; normal external auditory canal and pinna; tympanic membranes visualized and normal; normal lids and conjunctivae  Neck: supple, symmetric, no thyromegaly or lymphadenopathy  Respiratory: normal respiratory effort; clear to auscultation bilaterally  Cardiovascular: regular rate and rhythm; no edema  Gastrointestinal: soft, non-tender, and non-distended; no organomegaly or masses  Musculoskeletal: normal gait and station  Foot exam: feet intact - no lesions or ulcers; sensation intact to monofilament  Psych: normal judgment and insight; normal mood and affect; recent and remote memory intact    ---    (Note was completed, in part, with [employer] voice-recognition software. Documentation was reviewed, but some grammatical, spelling, and word errors may remain.)

## 2022-05-03 DIAGNOSIS — I50.22 CHRONIC SYSTOLIC CONGESTIVE HEART FAILURE (H): ICD-10-CM

## 2022-05-05 RX ORDER — METOPROLOL SUCCINATE 200 MG/1
200 TABLET, EXTENDED RELEASE ORAL DAILY
Qty: 90 TABLET | Refills: 3 | Status: SHIPPED | OUTPATIENT
Start: 2022-05-05 | End: 2023-04-25

## 2022-05-05 NOTE — TELEPHONE ENCOUNTER
Last Clinic Visit: 4/4/2022  [facility]

## 2022-05-21 DIAGNOSIS — I25.5 ISCHEMIC CARDIOMYOPATHY: ICD-10-CM

## 2022-05-24 RX ORDER — BUMETANIDE 0.5 MG/1
0.5 TABLET ORAL DAILY
Qty: 90 TABLET | Refills: 3 | Status: SHIPPED | OUTPATIENT
Start: 2022-05-24 | End: 2023-08-29

## 2022-06-23 DIAGNOSIS — E11.59 TYPE 2 DIABETES MELLITUS WITH OTHER CIRCULATORY COMPLICATION, WITHOUT LONG-TERM CURRENT USE OF INSULIN (H): ICD-10-CM

## 2022-06-23 DIAGNOSIS — I50.22 CHRONIC SYSTOLIC CONGESTIVE HEART FAILURE (H): ICD-10-CM

## 2022-06-23 RX ORDER — DAPAGLIFLOZIN 10 MG/1
TABLET, FILM COATED ORAL
Qty: 90 TABLET | Refills: 1 | Status: SHIPPED | OUTPATIENT
Start: 2022-06-23 | End: 2022-12-15

## 2022-06-30 NOTE — NURSING NOTE
Return Appointment: 6 months. Patient given instructions regarding scheduling next clinic visit. Patient demonstrated understanding of this information and agreed to call with further questions or concerns.  Patient stated he understood all health information given and agreed to call with further questions or concerns.   Moderna dose 1 and 2

## 2022-07-07 ENCOUNTER — TRANSFERRED RECORDS (OUTPATIENT)
Dept: INTERNAL MEDICINE | Facility: CLINIC | Age: 57
End: 2022-07-07

## 2022-07-07 LAB — RETINOPATHY: POSITIVE

## 2022-07-12 ENCOUNTER — ANCILLARY PROCEDURE (OUTPATIENT)
Dept: CARDIOLOGY | Facility: CLINIC | Age: 57
End: 2022-07-12
Attending: INTERNAL MEDICINE
Payer: COMMERCIAL

## 2022-07-12 DIAGNOSIS — I42.9 CARDIOMYOPATHY (H): ICD-10-CM

## 2022-07-12 PROCEDURE — 93296 REM INTERROG EVL PM/IDS: CPT

## 2022-07-12 PROCEDURE — 93295 DEV INTERROG REMOTE 1/2/MLT: CPT | Performed by: INTERNAL MEDICINE

## 2022-08-08 LAB
MDC_IDC_LEAD_IMPLANT_DT: NORMAL
MDC_IDC_LEAD_LOCATION: NORMAL
MDC_IDC_LEAD_LOCATION_DETAIL_1: NORMAL
MDC_IDC_LEAD_MFG: NORMAL
MDC_IDC_LEAD_MODEL: NORMAL
MDC_IDC_LEAD_POLARITY_TYPE: NORMAL
MDC_IDC_LEAD_SERIAL: NORMAL
MDC_IDC_LEAD_SPECIAL_FUNCTION: NORMAL
MDC_IDC_MSMT_BATTERY_DTM: NORMAL
MDC_IDC_MSMT_BATTERY_REMAINING_LONGEVITY: 93 MO
MDC_IDC_MSMT_BATTERY_RRT_TRIGGER: 2.73
MDC_IDC_MSMT_BATTERY_STATUS: NORMAL
MDC_IDC_MSMT_BATTERY_VOLTAGE: 3.01 V
MDC_IDC_MSMT_CAP_CHARGE_DTM: NORMAL
MDC_IDC_MSMT_CAP_CHARGE_ENERGY: 18 J
MDC_IDC_MSMT_CAP_CHARGE_TIME: 3.66
MDC_IDC_MSMT_CAP_CHARGE_TYPE: NORMAL
MDC_IDC_MSMT_LEADCHNL_RV_IMPEDANCE_VALUE: 380 OHM
MDC_IDC_MSMT_LEADCHNL_RV_PACING_THRESHOLD_AMPLITUDE: 0.5 V
MDC_IDC_MSMT_LEADCHNL_RV_PACING_THRESHOLD_PULSEWIDTH: 0.4 MS
MDC_IDC_MSMT_LEADCHNL_RV_SENSING_INTR_AMPL: 8.5 MV
MDC_IDC_PG_IMPLANT_DTM: NORMAL
MDC_IDC_PG_MFG: NORMAL
MDC_IDC_PG_MODEL: NORMAL
MDC_IDC_PG_SERIAL: NORMAL
MDC_IDC_PG_TYPE: NORMAL
MDC_IDC_SESS_CLINIC_NAME: NORMAL
MDC_IDC_SESS_DTM: NORMAL
MDC_IDC_SESS_TYPE: NORMAL
MDC_IDC_SET_BRADY_HYSTRATE: NORMAL
MDC_IDC_SET_BRADY_LOWRATE: 40 {BEATS}/MIN
MDC_IDC_SET_BRADY_MODE: NORMAL
MDC_IDC_SET_LEADCHNL_RV_PACING_AMPLITUDE: 2 V
MDC_IDC_SET_LEADCHNL_RV_PACING_ANODE_ELECTRODE_1: NORMAL
MDC_IDC_SET_LEADCHNL_RV_PACING_ANODE_LOCATION_1: NORMAL
MDC_IDC_SET_LEADCHNL_RV_PACING_CAPTURE_MODE: NORMAL
MDC_IDC_SET_LEADCHNL_RV_PACING_CATHODE_ELECTRODE_1: NORMAL
MDC_IDC_SET_LEADCHNL_RV_PACING_CATHODE_LOCATION_1: NORMAL
MDC_IDC_SET_LEADCHNL_RV_PACING_POLARITY: NORMAL
MDC_IDC_SET_LEADCHNL_RV_PACING_PULSEWIDTH: 0.4 MS
MDC_IDC_SET_LEADCHNL_RV_SENSING_ANODE_ELECTRODE_1: NORMAL
MDC_IDC_SET_LEADCHNL_RV_SENSING_ANODE_LOCATION_1: NORMAL
MDC_IDC_SET_LEADCHNL_RV_SENSING_CATHODE_ELECTRODE_1: NORMAL
MDC_IDC_SET_LEADCHNL_RV_SENSING_CATHODE_LOCATION_1: NORMAL
MDC_IDC_SET_LEADCHNL_RV_SENSING_POLARITY: NORMAL
MDC_IDC_SET_LEADCHNL_RV_SENSING_SENSITIVITY: 0.3 MV
MDC_IDC_SET_ZONE_DETECTION_BEATS_DENOMINATOR: 40 {BEATS}
MDC_IDC_SET_ZONE_DETECTION_BEATS_NUMERATOR: 30 {BEATS}
MDC_IDC_SET_ZONE_DETECTION_INTERVAL: 300 MS
MDC_IDC_SET_ZONE_DETECTION_INTERVAL: 360 MS
MDC_IDC_SET_ZONE_DETECTION_INTERVAL: 390 MS
MDC_IDC_SET_ZONE_DETECTION_INTERVAL: NORMAL
MDC_IDC_SET_ZONE_TYPE: NORMAL
MDC_IDC_STAT_AT_BURDEN_PERCENT: 0 %
MDC_IDC_STAT_AT_DTM_END: NORMAL
MDC_IDC_STAT_AT_DTM_START: NORMAL
MDC_IDC_STAT_BRADY_DTM_END: NORMAL
MDC_IDC_STAT_BRADY_DTM_START: NORMAL
MDC_IDC_STAT_BRADY_RV_PERCENT_PACED: 0.1 %
MDC_IDC_STAT_EPISODE_RECENT_COUNT: 0
MDC_IDC_STAT_EPISODE_RECENT_COUNT_DTM_END: NORMAL
MDC_IDC_STAT_EPISODE_RECENT_COUNT_DTM_START: NORMAL
MDC_IDC_STAT_EPISODE_TOTAL_COUNT: 0
MDC_IDC_STAT_EPISODE_TOTAL_COUNT_DTM_END: NORMAL
MDC_IDC_STAT_EPISODE_TOTAL_COUNT_DTM_START: NORMAL
MDC_IDC_STAT_EPISODE_TYPE: NORMAL
MDC_IDC_STAT_TACHYTHERAPY_ATP_DELIVERED_RECENT: 0
MDC_IDC_STAT_TACHYTHERAPY_ATP_DELIVERED_TOTAL: 0
MDC_IDC_STAT_TACHYTHERAPY_RECENT_DTM_END: NORMAL
MDC_IDC_STAT_TACHYTHERAPY_RECENT_DTM_START: NORMAL
MDC_IDC_STAT_TACHYTHERAPY_SHOCKS_ABORTED_RECENT: 0
MDC_IDC_STAT_TACHYTHERAPY_SHOCKS_ABORTED_TOTAL: 0
MDC_IDC_STAT_TACHYTHERAPY_SHOCKS_DELIVERED_RECENT: 0
MDC_IDC_STAT_TACHYTHERAPY_SHOCKS_DELIVERED_TOTAL: 0
MDC_IDC_STAT_TACHYTHERAPY_TOTAL_DTM_END: NORMAL
MDC_IDC_STAT_TACHYTHERAPY_TOTAL_DTM_START: NORMAL

## 2022-08-22 DIAGNOSIS — Z91.09 ENVIRONMENTAL ALLERGIES: ICD-10-CM

## 2022-08-24 RX ORDER — CETIRIZINE HYDROCHLORIDE 10 MG/1
TABLET ORAL
Qty: 90 TABLET | Refills: 1 | Status: SHIPPED | OUTPATIENT
Start: 2022-08-24 | End: 2023-02-20

## 2022-08-24 NOTE — TELEPHONE ENCOUNTER
Prescription approved per [facility] Refill Protocol.  Laura Blair, RN  [facility] Triage Nurse

## 2022-10-10 ENCOUNTER — OFFICE VISIT (OUTPATIENT)
Dept: INTERNAL MEDICINE | Facility: CLINIC | Age: 57
End: 2022-10-10
Payer: COMMERCIAL

## 2022-10-10 VITALS
SYSTOLIC BLOOD PRESSURE: 118 MMHG | TEMPERATURE: 97.5 F | DIASTOLIC BLOOD PRESSURE: 80 MMHG | WEIGHT: 138.1 LBS | BODY MASS INDEX: 25.45 KG/M2 | HEART RATE: 73 BPM | OXYGEN SATURATION: 98 %

## 2022-10-10 DIAGNOSIS — E11.9 TYPE 2 DIABETES MELLITUS WITHOUT COMPLICATION, WITHOUT LONG-TERM CURRENT USE OF INSULIN (H): Primary | ICD-10-CM

## 2022-10-10 DIAGNOSIS — Z23 NEED FOR PROPHYLACTIC VACCINATION AND INOCULATION AGAINST INFLUENZA: ICD-10-CM

## 2022-10-10 LAB
ALBUMIN SERPL-MCNC: 4.1 G/DL (ref 3.4–5)
ALP SERPL-CCNC: 67 U/L (ref 40–150)
ALT SERPL W P-5'-P-CCNC: 46 U/L (ref 0–70)
ANION GAP SERPL CALCULATED.3IONS-SCNC: 3 MMOL/L (ref 3–14)
AST SERPL W P-5'-P-CCNC: 28 U/L (ref 0–45)
BILIRUB SERPL-MCNC: 0.8 MG/DL (ref 0.2–1.3)
BUN SERPL-MCNC: 23 MG/DL (ref 7–30)
CALCIUM SERPL-MCNC: 9.5 MG/DL (ref 8.5–10.1)
CHLORIDE BLD-SCNC: 108 MMOL/L (ref 94–109)
CO2 SERPL-SCNC: 29 MMOL/L (ref 20–32)
CREAT SERPL-MCNC: 1.31 MG/DL (ref 0.66–1.25)
ERYTHROCYTE [DISTWIDTH] IN BLOOD BY AUTOMATED COUNT: 13.7 % (ref 10–15)
GFR SERPL CREATININE-BSD FRML MDRD: 64 ML/MIN/1.73M2
GLUCOSE BLD-MCNC: 170 MG/DL (ref 70–99)
HBA1C MFR BLD: 7.8 % (ref 0–5.6)
HCT VFR BLD AUTO: 53.7 % (ref 40–53)
HGB BLD-MCNC: 17.4 G/DL (ref 13.3–17.7)
MCH RBC QN AUTO: 31.1 PG (ref 26.5–33)
MCHC RBC AUTO-ENTMCNC: 32.4 G/DL (ref 31.5–36.5)
MCV RBC AUTO: 96 FL (ref 78–100)
PLATELET # BLD AUTO: 205 10E3/UL (ref 150–450)
POTASSIUM BLD-SCNC: 5.1 MMOL/L (ref 3.4–5.3)
PROT SERPL-MCNC: 8.3 G/DL (ref 6.8–8.8)
RBC # BLD AUTO: 5.59 10E6/UL (ref 4.4–5.9)
SODIUM SERPL-SCNC: 140 MMOL/L (ref 133–144)
WBC # BLD AUTO: 9.6 10E3/UL (ref 4–11)

## 2022-10-10 PROCEDURE — 83036 HEMOGLOBIN GLYCOSYLATED A1C: CPT | Performed by: INTERNAL MEDICINE

## 2022-10-10 PROCEDURE — 80053 COMPREHEN METABOLIC PANEL: CPT | Performed by: INTERNAL MEDICINE

## 2022-10-10 PROCEDURE — 90682 RIV4 VACC RECOMBINANT DNA IM: CPT | Performed by: INTERNAL MEDICINE

## 2022-10-10 PROCEDURE — 99213 OFFICE O/P EST LOW 20 MIN: CPT | Mod: 25 | Performed by: INTERNAL MEDICINE

## 2022-10-10 PROCEDURE — 85027 COMPLETE CBC AUTOMATED: CPT | Performed by: INTERNAL MEDICINE

## 2022-10-10 PROCEDURE — 36415 COLL VENOUS BLD VENIPUNCTURE: CPT | Performed by: INTERNAL MEDICINE

## 2022-10-10 PROCEDURE — 90471 IMMUNIZATION ADMIN: CPT | Performed by: INTERNAL MEDICINE

## 2022-10-10 NOTE — PROGRESS NOTES
ASSESSMENT/PLAN                                                      (E11.9) Type 2 diabetes mellitus without complication, without long-term current use of insulin (H)  (primary encounter diagnosis)  Plan: diabetes labs today; recommendations to follow.     (Z23) Need for prophylactic vaccination and inoculation against influenza  Plan: flu shot given today.     Shanna Church MD   [facility]  [address] [place] [address]  T: [phone], F: [phone]    YAMILE Henao is a very pleasant 57 year old male who presents for diabetes follow-up:    Patient's current diabetes regimen is metformin XR 1000 mg daily and Farxiga 10 mg daily.. He is adherent to his diabetic regimen and denies episodes of hypoglycemia. He is adherent to his diabetic diet. No diabetic complications. Annual diabetic eye exam is up to date. He is checking his feet regularly. His pertinent vaccinations (Pneumovax, influenza) are NOT up to date. He is on a daily baby aspirin, a statin and an ACE-I or ARB. Blood pressure is well-controlled on current medications.      OBJECTIVE                                                      /80   Pulse 73   Temp 97.5  F (36.4  C) (Tympanic)   Wt 62.6 kg (138 lb 1.6 oz)   SpO2 98%   BMI 25.45 kg/m    Constitutional: well-appearing  Psych: normal judgment and insight; normal mood and affect; recent and remote memory intact    ---    (Note documentation was completed, in part, with [employer] voice-recognition software. Documentation was reviewed, but some grammatical, spelling, and word errors may remain.)

## 2022-10-11 ENCOUNTER — ANCILLARY PROCEDURE (OUTPATIENT)
Dept: CARDIOLOGY | Facility: CLINIC | Age: 57
End: 2022-10-11
Attending: INTERNAL MEDICINE
Payer: COMMERCIAL

## 2022-10-11 DIAGNOSIS — I42.9 CARDIOMYOPATHY (H): ICD-10-CM

## 2022-10-11 PROCEDURE — 93295 DEV INTERROG REMOTE 1/2/MLT: CPT | Performed by: INTERNAL MEDICINE

## 2022-10-11 PROCEDURE — 93296 REM INTERROG EVL PM/IDS: CPT

## 2022-10-12 ENCOUNTER — TELEPHONE (OUTPATIENT)
Dept: INTERNAL MEDICINE | Facility: CLINIC | Age: 57
End: 2022-10-12

## 2022-10-12 NOTE — TELEPHONE ENCOUNTER
Reason for Call:  Other     Detailed comments: Pt is requesting a call back but would not state a reason why when asked. Pt just requested that he gets a call from provider Dr. Church.    Phone Number Patient can be reached at: Cell number on file:    Telephone Information:   Mobile [phone]       Best Time: any    Can we leave a detailed message on this number? YES    Call taken on 10/12/2022 at 8:07 AM by Elise Boone

## 2022-10-13 ENCOUNTER — NURSE TRIAGE (OUTPATIENT)
Dept: NURSING | Facility: CLINIC | Age: 57
End: 2022-10-13

## 2022-10-13 NOTE — TELEPHONE ENCOUNTER
Van wife - calling - Pt with her -used as     MSG provided:     Diabetes is poorly controlled.   CONTINUE Metformin XR and Farxiga without change.   START Januvia 100mg daily.   Adhere to a heart, healthy diabetic diet.   Recommend diabetes follow-up in 3 months (not 6). (Okay to use a virtual of lunch spot - please assist with scheduling).     Caller transferred to scheduling  Care advise given and caller s questions were answered  Reminded we will be here 24/7 Call back nurse line with questions or concerns.    Melody Bella RN [facility] Nurse Advisor,  4:41 PM 10/13/2022

## 2022-10-13 NOTE — TELEPHONE ENCOUNTER
Attempted to call patient woman answered who went to get patient     Call got disconnected as patient was coming on phone    Attempted to call back but line was busy    Lucien Ryan RN

## 2022-10-14 LAB
MDC_IDC_LEAD_IMPLANT_DT: NORMAL
MDC_IDC_LEAD_LOCATION: NORMAL
MDC_IDC_LEAD_LOCATION_DETAIL_1: NORMAL
MDC_IDC_LEAD_MFG: NORMAL
MDC_IDC_LEAD_MODEL: NORMAL
MDC_IDC_LEAD_POLARITY_TYPE: NORMAL
MDC_IDC_LEAD_SERIAL: NORMAL
MDC_IDC_LEAD_SPECIAL_FUNCTION: NORMAL
MDC_IDC_MSMT_BATTERY_DTM: NORMAL
MDC_IDC_MSMT_BATTERY_REMAINING_LONGEVITY: 93 MO
MDC_IDC_MSMT_BATTERY_RRT_TRIGGER: 2.73
MDC_IDC_MSMT_BATTERY_STATUS: NORMAL
MDC_IDC_MSMT_BATTERY_VOLTAGE: 3 V
MDC_IDC_MSMT_CAP_CHARGE_DTM: NORMAL
MDC_IDC_MSMT_CAP_CHARGE_ENERGY: 18 J
MDC_IDC_MSMT_CAP_CHARGE_TIME: 3.66
MDC_IDC_MSMT_CAP_CHARGE_TYPE: NORMAL
MDC_IDC_MSMT_LEADCHNL_RV_IMPEDANCE_VALUE: 304 OHM
MDC_IDC_MSMT_LEADCHNL_RV_IMPEDANCE_VALUE: 342 OHM
MDC_IDC_MSMT_LEADCHNL_RV_PACING_THRESHOLD_AMPLITUDE: 0.5 V
MDC_IDC_MSMT_LEADCHNL_RV_PACING_THRESHOLD_PULSEWIDTH: 0.4 MS
MDC_IDC_MSMT_LEADCHNL_RV_SENSING_INTR_AMPL: 9.8 MV
MDC_IDC_PG_IMPLANT_DTM: NORMAL
MDC_IDC_PG_MFG: NORMAL
MDC_IDC_PG_MODEL: NORMAL
MDC_IDC_PG_SERIAL: NORMAL
MDC_IDC_PG_TYPE: NORMAL
MDC_IDC_SESS_CLINIC_NAME: NORMAL
MDC_IDC_SESS_DTM: NORMAL
MDC_IDC_SESS_TYPE: NORMAL
MDC_IDC_SET_BRADY_HYSTRATE: NORMAL
MDC_IDC_SET_BRADY_LOWRATE: 40 {BEATS}/MIN
MDC_IDC_SET_BRADY_MODE: NORMAL
MDC_IDC_SET_LEADCHNL_RV_PACING_AMPLITUDE: 2 V
MDC_IDC_SET_LEADCHNL_RV_PACING_ANODE_ELECTRODE_1: NORMAL
MDC_IDC_SET_LEADCHNL_RV_PACING_ANODE_LOCATION_1: NORMAL
MDC_IDC_SET_LEADCHNL_RV_PACING_CAPTURE_MODE: NORMAL
MDC_IDC_SET_LEADCHNL_RV_PACING_CATHODE_ELECTRODE_1: NORMAL
MDC_IDC_SET_LEADCHNL_RV_PACING_CATHODE_LOCATION_1: NORMAL
MDC_IDC_SET_LEADCHNL_RV_PACING_POLARITY: NORMAL
MDC_IDC_SET_LEADCHNL_RV_PACING_PULSEWIDTH: 0.4 MS
MDC_IDC_SET_LEADCHNL_RV_SENSING_ANODE_ELECTRODE_1: NORMAL
MDC_IDC_SET_LEADCHNL_RV_SENSING_ANODE_LOCATION_1: NORMAL
MDC_IDC_SET_LEADCHNL_RV_SENSING_CATHODE_ELECTRODE_1: NORMAL
MDC_IDC_SET_LEADCHNL_RV_SENSING_CATHODE_LOCATION_1: NORMAL
MDC_IDC_SET_LEADCHNL_RV_SENSING_POLARITY: NORMAL
MDC_IDC_SET_LEADCHNL_RV_SENSING_SENSITIVITY: 0.3 MV
MDC_IDC_SET_ZONE_DETECTION_BEATS_DENOMINATOR: 40 {BEATS}
MDC_IDC_SET_ZONE_DETECTION_BEATS_NUMERATOR: 30 {BEATS}
MDC_IDC_SET_ZONE_DETECTION_INTERVAL: 300 MS
MDC_IDC_SET_ZONE_DETECTION_INTERVAL: 360 MS
MDC_IDC_SET_ZONE_DETECTION_INTERVAL: 390 MS
MDC_IDC_SET_ZONE_DETECTION_INTERVAL: NORMAL
MDC_IDC_SET_ZONE_TYPE: NORMAL
MDC_IDC_STAT_AT_BURDEN_PERCENT: 0 %
MDC_IDC_STAT_AT_DTM_END: NORMAL
MDC_IDC_STAT_AT_DTM_START: NORMAL
MDC_IDC_STAT_BRADY_DTM_END: NORMAL
MDC_IDC_STAT_BRADY_DTM_START: NORMAL
MDC_IDC_STAT_BRADY_RV_PERCENT_PACED: 0.02 %
MDC_IDC_STAT_EPISODE_RECENT_COUNT: 0
MDC_IDC_STAT_EPISODE_RECENT_COUNT_DTM_END: NORMAL
MDC_IDC_STAT_EPISODE_RECENT_COUNT_DTM_START: NORMAL
MDC_IDC_STAT_EPISODE_TOTAL_COUNT: 0
MDC_IDC_STAT_EPISODE_TOTAL_COUNT_DTM_END: NORMAL
MDC_IDC_STAT_EPISODE_TOTAL_COUNT_DTM_START: NORMAL
MDC_IDC_STAT_EPISODE_TYPE: NORMAL
MDC_IDC_STAT_TACHYTHERAPY_ATP_DELIVERED_RECENT: 0
MDC_IDC_STAT_TACHYTHERAPY_ATP_DELIVERED_TOTAL: 0
MDC_IDC_STAT_TACHYTHERAPY_RECENT_DTM_END: NORMAL
MDC_IDC_STAT_TACHYTHERAPY_RECENT_DTM_START: NORMAL
MDC_IDC_STAT_TACHYTHERAPY_SHOCKS_ABORTED_RECENT: 0
MDC_IDC_STAT_TACHYTHERAPY_SHOCKS_ABORTED_TOTAL: 0
MDC_IDC_STAT_TACHYTHERAPY_SHOCKS_DELIVERED_RECENT: 0
MDC_IDC_STAT_TACHYTHERAPY_SHOCKS_DELIVERED_TOTAL: 0
MDC_IDC_STAT_TACHYTHERAPY_TOTAL_DTM_END: NORMAL
MDC_IDC_STAT_TACHYTHERAPY_TOTAL_DTM_START: NORMAL

## 2022-10-14 NOTE — TELEPHONE ENCOUNTER
Attempted to get interpretor was prompted to leave a message interpretor over the phone not currently working    Lucien Ryan RN

## 2022-10-18 NOTE — TELEPHONE ENCOUNTER
Called patient and LVM with help of .     Patient Contact    Attempt # 2    Was call answered?  No.  Left message on voicemail with information to call me back.    Joann Oropeza RN

## 2022-10-19 NOTE — TELEPHONE ENCOUNTER
Patient Contact    Attempt # 3    Was call answered?  No.  Left message on voicemail with assistance of  with information to call me back.    Three attempts made to reach patient. Closing encounter.    Joann Oropeza RN

## 2022-10-21 DIAGNOSIS — I25.10 CORONARY ARTERY DISEASE INVOLVING NATIVE CORONARY ARTERY OF NATIVE HEART WITHOUT ANGINA PECTORIS: ICD-10-CM

## 2022-10-21 RX ORDER — ASPIRIN 81 MG
TABLET,CHEWABLE ORAL
Qty: 90 TABLET | Refills: 3 | Status: SHIPPED | OUTPATIENT
Start: 2022-10-21 | End: 2023-10-31

## 2022-10-30 DIAGNOSIS — I25.10 CORONARY ARTERY DISEASE INVOLVING NATIVE CORONARY ARTERY OF NATIVE HEART WITHOUT ANGINA PECTORIS: ICD-10-CM

## 2022-11-03 RX ORDER — ATORVASTATIN CALCIUM 40 MG/1
40 TABLET, FILM COATED ORAL DAILY
Qty: 90 TABLET | Refills: 3 | Status: SHIPPED | OUTPATIENT
Start: 2022-11-03 | End: 2023-10-25

## 2022-11-03 NOTE — TELEPHONE ENCOUNTER
ATORVASTATIN 40 MG TABLET  Last Written Prescription Date:   11/9/2021  Last Fill Quantity: 90,   # refills: 3  Last Office Visit :  4/4/2022  Future Office visit:   11/17/2022  90 Tabs, 3 Refill sent to pharm       Chanell Baires RN  Central Triage Red Flags/Med Refills

## 2022-11-11 DIAGNOSIS — I50.22 CHRONIC SYSTOLIC CONGESTIVE HEART FAILURE (H): Primary | ICD-10-CM

## 2022-11-17 ENCOUNTER — LAB (OUTPATIENT)
Dept: LAB | Facility: CLINIC | Age: 57
End: 2022-11-17
Payer: COMMERCIAL

## 2022-11-17 ENCOUNTER — OFFICE VISIT (OUTPATIENT)
Dept: CARDIOLOGY | Facility: CLINIC | Age: 57
End: 2022-11-17
Attending: NURSE PRACTITIONER
Payer: COMMERCIAL

## 2022-11-17 VITALS
HEART RATE: 72 BPM | WEIGHT: 136.5 LBS | OXYGEN SATURATION: 100 % | BODY MASS INDEX: 25.77 KG/M2 | HEIGHT: 61 IN | DIASTOLIC BLOOD PRESSURE: 81 MMHG | SYSTOLIC BLOOD PRESSURE: 130 MMHG

## 2022-11-17 DIAGNOSIS — T46.6X5A MYALGIA DUE TO STATIN: ICD-10-CM

## 2022-11-17 DIAGNOSIS — L81.9 HYPERPIGMENTATION OF SKIN: ICD-10-CM

## 2022-11-17 DIAGNOSIS — T46.6X5A MYALGIA DUE TO STATIN: Primary | ICD-10-CM

## 2022-11-17 DIAGNOSIS — I50.22 CHRONIC SYSTOLIC CONGESTIVE HEART FAILURE (H): ICD-10-CM

## 2022-11-17 DIAGNOSIS — M79.10 MYALGIA DUE TO STATIN: Primary | ICD-10-CM

## 2022-11-17 DIAGNOSIS — M79.10 MYALGIA DUE TO STATIN: ICD-10-CM

## 2022-11-17 LAB
ALBUMIN SERPL BCG-MCNC: 4.3 G/DL (ref 3.5–5.2)
ALP SERPL-CCNC: 54 U/L (ref 40–129)
ALT SERPL W P-5'-P-CCNC: 21 U/L (ref 10–50)
ANION GAP SERPL CALCULATED.3IONS-SCNC: 14 MMOL/L (ref 7–15)
AST SERPL W P-5'-P-CCNC: 33 U/L (ref 10–50)
BILIRUB DIRECT SERPL-MCNC: NORMAL MG/DL
BILIRUB SERPL-MCNC: 0.9 MG/DL
BUN SERPL-MCNC: 25.8 MG/DL (ref 6–20)
CALCIUM SERPL-MCNC: 8.8 MG/DL (ref 8.6–10)
CHLORIDE SERPL-SCNC: 103 MMOL/L (ref 98–107)
CK SERPL-CCNC: 113 U/L (ref 39–308)
CREAT SERPL-MCNC: 1.32 MG/DL (ref 0.67–1.17)
DEPRECATED HCO3 PLAS-SCNC: 24 MMOL/L (ref 22–29)
GFR SERPL CREATININE-BSD FRML MDRD: 64 ML/MIN/1.73M2
GLUCOSE SERPL-MCNC: 162 MG/DL (ref 70–99)
POTASSIUM SERPL-SCNC: 4.7 MMOL/L (ref 3.4–5.3)
PROT SERPL-MCNC: 7.3 G/DL (ref 6.4–8.3)
SODIUM SERPL-SCNC: 141 MMOL/L (ref 136–145)

## 2022-11-17 PROCEDURE — 82550 ASSAY OF CK (CPK): CPT | Performed by: PATHOLOGY

## 2022-11-17 PROCEDURE — 99214 OFFICE O/P EST MOD 30 MIN: CPT | Performed by: NURSE PRACTITIONER

## 2022-11-17 PROCEDURE — 36415 COLL VENOUS BLD VENIPUNCTURE: CPT | Performed by: PATHOLOGY

## 2022-11-17 PROCEDURE — G0463 HOSPITAL OUTPT CLINIC VISIT: HCPCS

## 2022-11-17 PROCEDURE — 80053 COMPREHEN METABOLIC PANEL: CPT | Performed by: PATHOLOGY

## 2022-11-17 ASSESSMENT — PAIN SCALES - GENERAL: PAINLEVEL: NO PAIN (0)

## 2022-11-17 NOTE — PROGRESS NOTES
HPI:   Mr. 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 He presents to CORE clinic for routine follow up.     He is feeling great and so excited his sister has come over from [place] after 35 years. He is able to walk miles without issues. He complains of upper thigh aches. He denies fever, chills, lightheadedness, dizziness, chest pain, palpitations, SOB, RAYMOND, PND, orthopnea, nausea, vomiting, diarrhea, hematochezia, melena, or LE edema. His weight remains stable at 136 lbs. He continues to follow a low sodium diet.      PAST MEDICAL HISTORY:  Past Medical History:   Diagnosis Date     Benign essential hypertension      CAD (coronary artery disease) 2017    RCA STEMI s/p balloon angioplasty and multiple Sofie to RCA, LCx, and LAD     Chronic systolic congestive heart failure (H)      History of deep venous thrombosis 2017    left internal jugular (line-associated); left common femoral; was on coumadin     History of mitral valve repair 2017     History of stroke 2017    no residual deficits     ICD (implantable cardioverter-defibrillator) in place      Type 2 diabetes mellitus (H)        FAMILY HISTORY:  Family History   Problem Relation Age of Onset     Hypertension Mother      Diabetes Type 2  Father      Hypertension Father      Myocardial Infarction No family hx of      Cerebrovascular Disease No family hx of      Coronary Artery Disease Early Onset No family hx of      Colon Cancer No family hx of      Prostate Cancer No family hx of        SOCIAL HISTORY:  Social History     Socioeconomic History     Marital status:    Occupational History     Occupation: [employer]   Tobacco Use     Smoking status: Former      Packs/day: 0.50     Years: 30.00     Pack years: 15.00     Types: Cigarettes     Quit date: 2017     Years since quittin.8     Smokeless tobacco: Never   Vaping Use     Vaping Use: Never used   Substance and Sexual Activity     Alcohol use: No     Drug use: No     Sexual activity: Not Currently   Social History Narrative    . Remarried.    4 children with first marriage.    3 grand children.    No formal exercise.        CURRENT MEDICATIONS:  Outpatient Medications Prior to Visit   Medication Sig Dispense Refill     ASPIRIN LOW DOSE 81 MG chewable tablet TAKE 1 TABLET BY MOUTH EVERY DAY 90 tablet 3     atorvastatin (LIPITOR) 40 MG tablet Take 1 tablet (40 mg) by mouth daily 90 tablet 3     bumetanide (BUMEX) 0.5 MG tablet Take 1 tablet (0.5 mg) by mouth daily 90 tablet 3     cetirizine (ZYRTEC) 10 MG tablet TAKE 1 TABLET (10 MG) BY MOUTH DAILY. 90 tablet 1     co-enzyme Q-10 100 MG CAPS capsule Take by mouth daily       FARXIGA 10 MG TABS tablet TAKE 1 TABLET (10 MG) BY MOUTH DAILY. 90 tablet 1     metFORMIN (GLUCOPHAGE-XR) 500 MG 24 hr tablet Take 2 tablets (1,000 mg) by mouth daily (with dinner) 180 tablet 3     metoprolol succinate ER (TOPROL XL) 200 MG 24 hr tablet Take 1 tablet (200 mg) by mouth daily 90 tablet 3     ONETOUCH ULTRA test strip USE TO TEST BLOOD SUGAR 3 TIMES DAILY OR AS DIRECTED. 100 strip 3     sacubitril-valsartan (ENTRESTO) 49-51 MG per tablet 1 pill in the morning and 2 pills in the evening 270 tablet 3     sitagliptin (JANUVIA) 100 MG tablet Take 1 tablet (100 mg) by mouth daily 90 tablet 3     No facility-administered medications prior to visit.       ROS:   CONSTITUTIONAL: Denies fever, chills, fatigue, or weight fluctuations.   HEENT: Denies headache, vision changes, and changes in speech.   CV: Refer to HPI.   PULMONARY:Denies shortness of breath, cough, or previous TB exposure.   GI:Denies nausea, vomiting, diarrhea, and abdominal pain. Bowel movements are regular.  "  :Denies urinary alterations, dysuria, urinary frequency, hematuria, and abnormal drainage.   EXT:Denies lower extremity edema.   SKIN:Denies abnormal rashes or lesions.   MUSCULOSKELETAL:Denies upper or lower extremity weakness and pain.   NEUROLOGIC:Denies lightheadedness, dizziness, seizures, or upper or lower extremity paresthesia.     EXAM:  /81 (BP Location: Right arm, Patient Position: Chair, Cuff Size: Adult Regular)   Pulse 72   Ht 1.555 m (5' 1.22\")   Wt 61.9 kg (136 lb 8 oz)   SpO2 100%   BMI 25.61 kg/m    GENERAL: Appears alert and oriented times three.   HEENT: Eye symmetrical and free of discharge bilaterally. Mucous membranes moist and without lesions.  NECK: Supple and without lymphadenopathy. JVD at clavicular line.  CV: RRR, S1S2 present without murmur, rub, or gallop.   RESPIRATORY: Respirations regular, even, and unlabored. Lungs CTA throughout.   GI: Soft and non distended with normoactive bowel sounds present in all quadrants. No tenderness, rebound, guarding. No organomegaly.   EXTREMITIES: No peripheral edema. 2+ bilateral pedal pulses.   NEUROLOGIC: Alert and orientated x 3. CN II-XII grossly intact. No focal deficits.   MUSCULOSKELETAL: No joint swelling or tenderness.   SKIN: No jaundice. No rashes or lesions.     The following Labs were reviewed today:  CBC RESULTS:  Lab Results   Component Value Date    WBC 9.6 10/10/2022    WBC 8.8 05/19/2021    RBC 5.59 10/10/2022    RBC 5.23 05/19/2021    HGB 17.4 10/10/2022    HGB 15.5 05/19/2021    HCT 53.7 (H) 10/10/2022    HCT 48.5 05/19/2021    MCV 96 10/10/2022    MCV 93 05/19/2021    MCH 31.1 10/10/2022    MCH 29.6 05/19/2021    MCHC 32.4 10/10/2022    MCHC 32.0 05/19/2021    RDW 13.7 10/10/2022    RDW 15.3 (H) 05/19/2021     10/10/2022     05/19/2021       CMP RESULTS:  Lab Results   Component Value Date     11/17/2022     05/19/2021    POTASSIUM 4.7 11/17/2022    POTASSIUM 5.1 10/10/2022    POTASSIUM " 4.9 05/19/2021    CHLORIDE 103 11/17/2022    CHLORIDE 108 10/10/2022    CHLORIDE 106 05/19/2021    CO2 24 11/17/2022    CO2 29 10/10/2022    CO2 33 (H) 05/19/2021    ANIONGAP 14 11/17/2022    ANIONGAP 3 10/10/2022    ANIONGAP <1 (L) 05/19/2021     (H) 11/17/2022     (H) 10/10/2022     (H) 05/19/2021    BUN 25.8 (H) 11/17/2022    BUN 23 10/10/2022    BUN 24 05/19/2021    CR 1.32 (H) 11/17/2022    CR 1.18 05/19/2021    GFRESTIMATED 64 11/17/2022    GFRESTIMATED 70 05/19/2021    GFRESTBLACK 81 05/19/2021    ROB 8.8 11/17/2022    ROB 8.9 05/19/2021    BILITOTAL 0.8 10/10/2022    BILITOTAL 2.0 (H) 05/19/2021    ALBUMIN 4.1 10/10/2022    ALBUMIN 3.6 05/19/2021    ALKPHOS 67 10/10/2022    ALKPHOS 75 05/19/2021    ALT 46 10/10/2022    ALT 35 05/19/2021    AST 28 10/10/2022    AST 28 05/19/2021        INR RESULTS:  Lab Results   Component Value Date    INR 2.3 (A) 12/26/2017    INR 2.36 (H) 11/03/2017       Lab Results   Component Value Date    MAG 2.4 (H) 06/23/2017     Lab Results   Component Value Date    NTBNPI 9,984 (H) 04/08/2017     Lab Results   Component Value Date    NTBNP 1,032 (H) 01/19/2021       The following diagnostics were reviewed today:   Echo 1/19/21:   Interpretation Summary  Mild left ventricular dilation is present. Severely (EF 15-20%) reduced left  ventricular function is present. Diffuse severe hypokinesis present. Basal and  mid Inferior and inferolateral akinesis present     Global right ventricular function is mildly to moderately reduced.     Post MitraClip placement in 2017. Mild mitral insufficiency is present. Mean  gradient 2.5 mmHg at 69 bpm.     Pulmonary hypertension is present. Estimated pulmonary artery systolic  pressure is 48 mmHg.     The inferior vena cava was normal in size with preserved respiratory  variability.     No pericardial effusion is present.    Assessment and Plan:   Mr. 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 He presents to C[facility] clinic for routine follow up and is doing well.     Chronic systolic heart failure secondary to ICM.    Stage C, NYHA Class II  ACEi/ARB Entresto 49/51 mg po BID, intolerant to prior increase  BB Increase Toprol  mg po daily   Aldosterone antagonist contraindicated due to transient renal function in the past with PEDRO  SCD prophylaxis ICD.   Fluid status Euvolemic. Bumex 0.5 mg po daily.   SGLT2I: Farxiga 10 mg po daily   - Recommended repeat echo, he would like to wait until prior to next appointment.      CAD. S/p POBA RCA, FEMI to RCA times 7, LCx, and LAD 3/27/17 with refractory cardiogenic shock s/p IABP transitioned to subclavian balloon pump inpatient.   - ASA, Statin, BB, and Plavix.   - Obtain CK for myalgias on Lipitor.      Left IJ DVT. Provoked event, present on US 4/29/17, but nonocclusive. Initial diagnosis 4/23/17. Completed 3 months of Coumadin.   - Continue ASA.      CVA. Bilateral hemispheric infarcts secondary to watershed ischemia. Per Neurology no indication for long term anticoagulation from their perspective.   - Continue ASA and Lipitor.      MR s/p Mitral clip. Implanted 5/1/17. Secondary to Ischemic MR. Echo 1/21 consistent with mild mitral insufficiency is present. Mean gradient 2.5 mmHg at 69 bpm.     DM Type II.   - Management per PCP.   - Farxiga 10 mg po daily initiated.       Follow up Echo and CORE in 6 months.       Shyanne Osorio, RUBI CNP  11/17/2022          CC  SHYANNE OSORIO

## 2022-11-17 NOTE — LETTER
11/17/2022      RE: Luke Henao  [address]  [facility] [address]       Dear Colleague,    Thank you for the opportunity to participate in the care of your patient, Luke Henao, at the [facility] [place] at [facility]. Please see a copy of my visit note below.    HPI:   Mr. 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 He presents to CORE clinic for routine follow up.     He is feeling great and so excited his sister has come over from [place] after 35 years. He is able to walk miles without issues. He complains of upper thigh aches. He denies fever, chills, lightheadedness, dizziness, chest pain, palpitations, SOB, RAYMOND, PND, orthopnea, nausea, vomiting, diarrhea, hematochezia, melena, or LE edema. His weight remains stable at 136 lbs. He continues to follow a low sodium diet.      PAST MEDICAL HISTORY:  Past Medical History:   Diagnosis Date     Benign essential hypertension      CAD (coronary artery disease) 2017    RCA STEMI s/p balloon angioplasty and multiple Sofie to RCA, LCx, and LAD     Chronic systolic congestive heart failure (H)      History of deep venous thrombosis 2017    left internal jugular (line-associated); left common femoral; was on coumadin     History of mitral valve repair 2017     History of stroke 2017    no residual deficits     ICD (implantable cardioverter-defibrillator) in place      Type 2 diabetes mellitus (H)        FAMILY HISTORY:  Family History   Problem Relation Age of Onset     Hypertension Mother      Diabetes Type 2  Father      Hypertension Father      Myocardial Infarction No family hx of      Cerebrovascular Disease  No family hx of      Coronary Artery Disease Early Onset No family hx of      Colon Cancer No family hx of      Prostate Cancer No family hx of        SOCIAL HISTORY:  Social History     Socioeconomic History     Marital status:    Occupational History     Occupation: [employer]   Tobacco Use     Smoking status: Former     Packs/day: 0.50     Years: 30.00     Pack years: 15.00     Types: Cigarettes     Quit date: 2017     Years since quittin.8     Smokeless tobacco: Never   Vaping Use     Vaping Use: Never used   Substance and Sexual Activity     Alcohol use: No     Drug use: No     Sexual activity: Not Currently   Social History Narrative    . Remarried.    4 children with first marriage.    3 grand children.    No formal exercise.        CURRENT MEDICATIONS:  Outpatient Medications Prior to Visit   Medication Sig Dispense Refill     ASPIRIN LOW DOSE 81 MG chewable tablet TAKE 1 TABLET BY MOUTH EVERY DAY 90 tablet 3     atorvastatin (LIPITOR) 40 MG tablet Take 1 tablet (40 mg) by mouth daily 90 tablet 3     bumetanide (BUMEX) 0.5 MG tablet Take 1 tablet (0.5 mg) by mouth daily 90 tablet 3     cetirizine (ZYRTEC) 10 MG tablet TAKE 1 TABLET (10 MG) BY MOUTH DAILY. 90 tablet 1     co-enzyme Q-10 100 MG CAPS capsule Take by mouth daily       FARXIGA 10 MG TABS tablet TAKE 1 TABLET (10 MG) BY MOUTH DAILY. 90 tablet 1     metFORMIN (GLUCOPHAGE-XR) 500 MG 24 hr tablet Take 2 tablets (1,000 mg) by mouth daily (with dinner) 180 tablet 3     metoprolol succinate ER (TOPROL XL) 200 MG 24 hr tablet Take 1 tablet (200 mg) by mouth daily 90 tablet 3     ONETOUCH ULTRA test strip USE TO TEST BLOOD SUGAR 3 TIMES DAILY OR AS DIRECTED. 100 strip 3     sacubitril-valsartan (ENTRESTO) 49-51 MG per tablet 1 pill in the morning and 2 pills in the evening 270 tablet 3     sitagliptin (JANUVIA) 100 MG tablet Take 1 tablet (100 mg) by mouth daily 90 tablet 3     No facility-administered medications prior to  "visit.       ROS:   CONSTITUTIONAL: Denies fever, chills, fatigue, or weight fluctuations.   HEENT: Denies headache, vision changes, and changes in speech.   CV: Refer to HPI.   PULMONARY:Denies shortness of breath, cough, or previous TB exposure.   GI:Denies nausea, vomiting, diarrhea, and abdominal pain. Bowel movements are regular.   :Denies urinary alterations, dysuria, urinary frequency, hematuria, and abnormal drainage.   EXT:Denies lower extremity edema.   SKIN:Denies abnormal rashes or lesions.   MUSCULOSKELETAL:Denies upper or lower extremity weakness and pain.   NEUROLOGIC:Denies lightheadedness, dizziness, seizures, or upper or lower extremity paresthesia.     EXAM:  /81 (BP Location: Right arm, Patient Position: Chair, Cuff Size: Adult Regular)   Pulse 72   Ht 1.555 m (5' 1.22\")   Wt 61.9 kg (136 lb 8 oz)   SpO2 100%   BMI 25.61 kg/m    GENERAL: Appears alert and oriented times three.   HEENT: Eye symmetrical and free of discharge bilaterally. Mucous membranes moist and without lesions.  NECK: Supple and without lymphadenopathy. JVD at clavicular line.  CV: RRR, S1S2 present without murmur, rub, or gallop.   RESPIRATORY: Respirations regular, even, and unlabored. Lungs CTA throughout.   GI: Soft and non distended with normoactive bowel sounds present in all quadrants. No tenderness, rebound, guarding. No organomegaly.   EXTREMITIES: No peripheral edema. 2+ bilateral pedal pulses.   NEUROLOGIC: Alert and orientated x 3. CN II-XII grossly intact. No focal deficits.   MUSCULOSKELETAL: No joint swelling or tenderness.   SKIN: No jaundice. No rashes or lesions.     The following Labs were reviewed today:  CBC RESULTS:  Lab Results   Component Value Date    WBC 9.6 10/10/2022    WBC 8.8 05/19/2021    RBC 5.59 10/10/2022    RBC 5.23 05/19/2021    HGB 17.4 10/10/2022    HGB 15.5 05/19/2021    HCT 53.7 (H) 10/10/2022    HCT 48.5 05/19/2021    MCV 96 10/10/2022    MCV 93 05/19/2021    MCH 31.1 " 10/10/2022    MCH 29.6 05/19/2021    MCHC 32.4 10/10/2022    MCHC 32.0 05/19/2021    RDW 13.7 10/10/2022    RDW 15.3 (H) 05/19/2021     10/10/2022     05/19/2021       CMP RESULTS:  Lab Results   Component Value Date     11/17/2022     05/19/2021    POTASSIUM 4.7 11/17/2022    POTASSIUM 5.1 10/10/2022    POTASSIUM 4.9 05/19/2021    CHLORIDE 103 11/17/2022    CHLORIDE 108 10/10/2022    CHLORIDE 106 05/19/2021    CO2 24 11/17/2022    CO2 29 10/10/2022    CO2 33 (H) 05/19/2021    ANIONGAP 14 11/17/2022    ANIONGAP 3 10/10/2022    ANIONGAP <1 (L) 05/19/2021     (H) 11/17/2022     (H) 10/10/2022     (H) 05/19/2021    BUN 25.8 (H) 11/17/2022    BUN 23 10/10/2022    BUN 24 05/19/2021    CR 1.32 (H) 11/17/2022    CR 1.18 05/19/2021    GFRESTIMATED 64 11/17/2022    GFRESTIMATED 70 05/19/2021    GFRESTBLACK 81 05/19/2021    ROB 8.8 11/17/2022    ROB 8.9 05/19/2021    BILITOTAL 0.8 10/10/2022    BILITOTAL 2.0 (H) 05/19/2021    ALBUMIN 4.1 10/10/2022    ALBUMIN 3.6 05/19/2021    ALKPHOS 67 10/10/2022    ALKPHOS 75 05/19/2021    ALT 46 10/10/2022    ALT 35 05/19/2021    AST 28 10/10/2022    AST 28 05/19/2021        INR RESULTS:  Lab Results   Component Value Date    INR 2.3 (A) 12/26/2017    INR 2.36 (H) 11/03/2017       Lab Results   Component Value Date    MAG 2.4 (H) 06/23/2017     Lab Results   Component Value Date    NTBNPI 9,984 (H) 04/08/2017     Lab Results   Component Value Date    NTBNP 1,032 (H) 01/19/2021       The following diagnostics were reviewed today:   Echo 1/19/21:   Interpretation Summary  Mild left ventricular dilation is present. Severely (EF 15-20%) reduced left  ventricular function is present. Diffuse severe hypokinesis present. Basal and  mid Inferior and inferolateral akinesis present     Global right ventricular function is mildly to moderately reduced.     Post MitraClip placement in 2017. Mild mitral insufficiency is present. Mean  gradient 2.5 mmHg at  69 bpm.     Pulmonary hypertension is present. Estimated pulmonary artery systolic  pressure is 48 mmHg.     The inferior vena cava was normal in size with preserved respiratory  variability.     No pericardial effusion is present.    Assessment and Plan:   Mr. 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 He presents to CORE clinic for routine follow up and is doing well.     Chronic systolic heart failure secondary to ICM.    Stage C, NYHA Class II  ACEi/ARB Entresto 49/51 mg po BID, intolerant to prior increase  BB Increase Toprol  mg po daily   Aldosterone antagonist contraindicated due to transient renal function in the past with PEDRO  SCD prophylaxis ICD.   Fluid status Euvolemic. Bumex 0.5 mg po daily.   SGLT2I: Farxiga 10 mg po daily   - Recommended repeat echo, he would like to wait until prior to next appointment.      CAD. S/p POBA RCA, FEMI to RCA times 7, LCx, and LAD 3/27/17 with refractory cardiogenic shock s/p IABP transitioned to subclavian balloon pump inpatient.   - ASA, Statin, BB, and Plavix.   - Obtain CK for myalgias on Lipitor.      Left IJ DVT. Provoked event, present on US 4/29/17, but nonocclusive. Initial diagnosis 4/23/17. Completed 3 months of Coumadin.   - Continue ASA.      CVA. Bilateral hemispheric infarcts secondary to watershed ischemia. Per Neurology no indication for long term anticoagulation from their perspective.   - Continue ASA and Lipitor.      MR s/p Mitral clip. Implanted 5/1/17. Secondary to Ischemic MR. Echo 1/21 consistent with mild mitral insufficiency is present. Mean gradient 2.5 mmHg at 69 bpm.     DM Type II.   - Management per PCP.   - Farxiga 10 mg po daily initiated.        Follow up Echo and CORE in 6 months.       Shyanne Osorio, RUBI CNP  11/17/2022          CC  SHYANNE OSORIO

## 2022-11-17 NOTE — NURSING NOTE
Chief Complaint   Patient presents with     Follow Up     57 year old male with chronic systolic heart failure presents for follow up with labs prior       Vitals were taken and medications reconciled.    Champ Saenz, EMT  8:12 AM

## 2022-11-17 NOTE — PATIENT INSTRUCTIONS
Take your medicines every day, as directed    Changes made today:  No changes today    Monitor Your Weight and Symptoms    Contact us if you:    Gain 2 pounds in one day or 5 pounds in one week  Feel more short of breath  Notice more leg swelling  Feel lightheadeded   Change your lifestyle    Limit Salt or Sodium:  2000 mg  Limit Fluids:  2000 mL or approximately 64 ounces  Eat a Heart Healthy Diet  Low in saturated fats  Stay Active:  Aim to move at least 150 minutes every  week         To Contact us    During Business Hours:  [phone], option # 1      After hours, weekends or holidays:   [phone], Option #4  Ask to speak to the On-Call Cardiologist. Inform them you are a CORE/heart failure patient at the [facility].     Use MeetBall allows you to communicate directly with your heart team through secure messaging.  [employer]ention can be accessed any time on your phone, computer, or tablet.  If you need assistance, we'd be happy to help!         Keep your Heart Appointments:    CORE in 6 months with ECHO prior.      Please consider attending our virtual support group which is held monthly. Please reach out to Duc at [phone] for more information if you are interested in attending.     2022 dates:   - Monday, December 5th, 1-2pm: topic: How to thrive during winter and the holiday season with heart failure    2023 dates:  Monday, January 2nd , 1-2pm  Monday, February 6th , 1-2pm  Monday, March 6th , 1-2pm  Monday, April 3rd, 1-2pm  Monday, May 1st, 1-2pm  Monday, June 5th, 1-2pm  Monday, July 3rd, 1-2pm  Monday, August 7th, 1-2pm  Monday, September 11th, 1-2pm  Monday, October 2nd, 1-2pm  Monday, November 6th, 1-2pm  Monday, December 4th, 1-2pm

## 2022-12-15 DIAGNOSIS — E11.59 TYPE 2 DIABETES MELLITUS WITH OTHER CIRCULATORY COMPLICATION, WITHOUT LONG-TERM CURRENT USE OF INSULIN (H): ICD-10-CM

## 2022-12-15 DIAGNOSIS — I50.22 CHRONIC SYSTOLIC CONGESTIVE HEART FAILURE (H): ICD-10-CM

## 2022-12-15 RX ORDER — DAPAGLIFLOZIN 10 MG/1
TABLET, FILM COATED ORAL
Qty: 90 TABLET | Refills: 1 | Status: SHIPPED | OUTPATIENT
Start: 2022-12-15 | End: 2023-06-15

## 2022-12-15 NOTE — TELEPHONE ENCOUNTER
Last OV 11/17, continue Farxiga. Follow up scheduled 5/24.   Refill approved.   Soraya Messina RN

## 2022-12-22 NOTE — PROGRESS NOTES
Nephrology Progress Note  04/10/2017           Mr Henao is a 51 yom with hx of DM2 who presented with STEMI and ended up receiving 7 stents on 3/27 as he had 3-V CAD and was not a good CABG candidate due to targets. Now with cardiogenic shock, on IABP and had multifactorial PEDRO. Nephrology consulted for management of PEDRO.      Interval History  Mr Henao's Cr continues to improve, down to 1.6 today.  Does continue on bumex gtt to augment UOP, 4.8L yesterday although did have 4L of intake.  Has mild alkalosis despite respiratory acidosis, most likely due to bumex gtt, recommend stopping bumex and consider using acetazolamide if UOP does not keep up with intake, overall recovering kidney fx.     ASSESSMENT AND RECOMMENDATIONS:   PEDRO-Unclear baseline Cr as he has not been seen since 2008 prior to this event, does have DM2 which increases likelihood he had underlying CKD, presented with Cr of 2.2. Imaging did not note any abnormalities, has dawkins and making UOP with diuretics and so no suspicion for obstruction. PEDRO likely multifactorial with known hypoperfusion on 3/30-3/31 due to cardiogenic shock and tubular toxic effect of vanco particularly with kidneys recovering from contrast nephropathy and reduced autoregulation ability.  Now with clearly improving kidney fx, along with >4L of UOP with bumex gtt.  -Cr improving dramatically.  Will follow again, if Cr improves again will sign off.     Volume status-Elevated CVP at 12 but much improved.  Minimal LE edema.  CVP now 12 and has increasing UOP, 4.8L yesterday with diuretics.         Electrolytes-K+ 3.2 this am, likely due to bumex gtt.  Replaced to 3.8.  Can replace with goal >4.0 given alkalosis and improving kidney fx.    pH-Has resp acidosis but overcompensated metabolic alkalosis with VBG showing 7.50/52/34/41.  Likely due to distal tubule delivery of Na with bumex, would suggest replacing K+ as you are doing (as hypokalemia promotes exchange of K+/H+ in distal  "tubule, increasing H+ loss in UOP) as well as stopping bumex.  Can use diamox (acetazolamide) if augmentation of UOP is needed.      Hypernatremia-Resolved with free H2O.      BMD-Ca 8.4, corrects to normal with albumin of 2.5.  Phos 3.5 on last check.       Cardiogenic Shock-On dobutamine and has IABP for augmentation.       ID-Off of abx.        Recommendations were communicated to primary team via verbal communication.     Discussed with Dr Nikki Smith  Clinical Nurse Specialist  [phone]    Review of Systems:   I reviewed the following systems:   Gen: No fevers or chills.  CV: No CP  Resp: No SOB  GI: No N/V    Physical Exam:   I/O last 3 completed shifts:  In: 4435.46 [P.O.:60; I.V.:1225.46; NG/GT:2190]  Out: 4550 [Urine:4550]   /71 (BP Location: Left arm)  Temp 98.9  F (37.2  C) (Oral)  Resp 16  Ht 1.575 m (5' 2.01\")  Wt 62 kg (136 lb 11 oz)  SpO2 93%  BMI 24.99 kg/m2     GENERAL APPEARANCE: Lying in bed, IABP   EYES: No scleral icterus  NECK: Elevated JVD  Pulmonary: lungs clear to auscultation with equal breath sounds bilaterally, no clubbing or cyanosis  CV: Regular rhythm, normal rate, no rub  - JVP elevated  - Edema ++LE edema  GI: soft, nontender, normal bowel sounds  MS: no evidence of inflammation in joints, no muscle tenderness  : + Chavis  SKIN: no rash, warm, dry  NEURO: Awake, alert.       Labs:   All labs reviewed by me  Electrolytes/Renal -   Recent Labs   Lab Test  04/10/17   1046  04/10/17   0810  04/10/17   0301  04/09/17   2300  04/09/17   1612   04/08/17   0425   04/06/17   2149   04/06/17   0351   NA  142   --   144  143  145*   < >  149*   < >   --    < >  150*   POTASSIUM  3.8  3.2*  4.1  3.1*  3.5   < >  4.0   < >  3.1*   < >  3.4  3.5   CHLORIDE  98   --   100  97  100   < >  106   < >   --    < >  105   CO2  38*   --   37*  40*  39*   < >  37*   < >   --    < >  30   BUN  65*   --   70*  72*  75*   < >  92*   < >   --    < >  105*   CR  1.68*   --   1.81* "  1.94*  2.01*   < >  2.84*   < >   --    < >  5.10*   GLC  132*   --   157*  140*  146*   < >  155*   < >   --    < >  128*   ROB  8.4*   --   7.8*  7.9*  7.5*   < >  7.3*   < >   --    < >  7.2*   MAG   --    --   2.6*  2.6*  2.5*   < >  2.5*   < >  3.1*   < >  3.4*   PHOS   --    --    --    --    --    --   3.5   --   5.2*   --   7.4*    < > = values in this interval not displayed.       CBC -   Recent Labs   Lab Test  04/10/17   0301  04/09/17   0317  04/08/17   0425   WBC  13.3*  16.2*  16.7*   HGB  8.7*  9.3*  9.5*   PLT  175  180  201       LFTs -   Recent Labs   Lab Test  04/10/17   0301  04/09/17   0317  04/08/17   0425   ALKPHOS  88  89  96   BILITOTAL  0.9  0.8  0.8   ALT  129*  169*  215*   AST  44  47*  38   PROTTOTAL  6.5*  6.4*  6.3*   ALBUMIN  2.5*  2.4*  2.4*       Iron Panel -   Recent Labs   Lab Test  04/08/17 0425   IRON  33*   IRONSAT  12*   MANDA  535*           Current Medications:    hydrALAZINE  75 mg Oral Q6H     pneumococcal vaccine  0.5 mL Intramuscular Once     pantoprazole  40 mg Oral or Feeding Tube Daily     polyethylene glycol  17 g Oral Daily     sodium chloride (PF)  3 mL Intracatheter Q8H     multivitamins with minerals  15 mL Per Feeding Tube Daily     senna-docusate  1-2 tablet Oral or NG Tube BID     clopidogrel  75 mg Oral or NG Tube Daily     aspirin  81 mg Oral or Feeding Tube Daily       DOBUTamine 5 mcg/kg/min (04/10/17 1200)     HEParin 750 Units/hr (04/10/17 1200)     bumetanide 1 mg/hr (04/10/17 1200)     IV fluid REPLACEMENT ONLY Stopped (03/31/17 2200)     - MEDICATION INSTRUCTIONS -       IV fluid REPLACEMENT ONLY       insulin (regular) 2 Units/hr (04/10/17 1200)            not applicable (Male)

## 2023-01-03 NOTE — PROGRESS NOTES
[facility]  Internal medicine progress note  Luke Henao  [id number]  May 22, 2015         Assessment & Plan:   Luke Henao is a 51 year old man with a history of DM II and tobacco use who was admitted to [facility] 3/26/17 w/ cardiogenic shock 2/2 inferior wall STEMI and underwent RCA aspiration thrombectomy and balloon angioplasty, IABP and initiation of dopamine, temp pacer, and transferred to [facility] 3/27-5/11/17 for consideration of mechanical support. Had PCI w/ 7 FEMI to RCA, LAD, LCx and required epinephrine in addition to dopamine; post procedure had distributive shock picture from infection (possible aspiration PNA) vs. post-MI SIRS response. Later again developed cardiogenic shock requiring replacement of IABP and MitraClip for ischemic MR. Now transferred to ARU for ongoing rehab therapies.      CAD, ICM. S/p inferior wall STEMI and had 7 FEMI to RCA, LAD, LCx on 3/27/17. Refractory cardiogenic shock. S/p MitraClip on 5/1/17 for ischemic MR. IABP d/c'd on 5/5. Most recent echo 5/2 w/ EF 30-35%, basal inferolateral hypokinesis, less than mild residual MR, mild pulm HTN. ARU course c/b soft BPs so lisinopril (2.5 mg BID) d/c'd on 5/18, resumed at 2.5mg qd 5/21. SBPs most recently 100s-110s and asymptomatic - improved. Weight 122 lbs today, wt has been fluctuating from 118lb-123lb at ARU. No cardiac complaints at this time; no O2 needs or edema. Appears euvolemic on exam.   - Continue lisinopril 2.5 mg daily.   - Continue Plavix x 1 year for FEMI then can change to lifelong ASA (MitraClip).   - Continue digoxin.   - Continue spironolactone 12.5 mg daily and Bumex 0.5 mg BID (was 1 mg BID on admit) w/ daily weights, I&Os, and 3 g Na/1800 cc fluid restrictions.   - Continue statin.   - No beta blocker in setting of recent HF decompensation; will be added back as OP.   - Cardiology not following here but would d/w HF rounding team if issues.      Urinary Retention. No known hx BPH. Prolonged  [facility] paperwork was signed and faxed to [facility] [place]. Paperwork was also left at  for . Left voicemail informing pt. hospitalization w/ Chavis catheter. Was treated for E coli UTI; UA clear since. Intermittent urinary retention since d/c of Chavis prior to hospital d/c. Chavis replaced 5/12 and removed again 5/17. Flomax started 5/18. Still w/ intermittent retention - seems improved w/ Flomax and double void.   - Continue Flomax w/ close monitoring of BPs.   - Continue double void.   - PRN straight cath for PVR > 300 cc and no success w/ double void.   - May need OP urology f/u after d/c if persists.      DM II. HgbA1c 7.5%. Unknown home PTA regimen - had been given insulin but did not think he needed it so threw it away; verbalized a much better understanding of this today. BG controlled here on Lantus 25 units daily; not really using any sliding scale coverage. BG  over past 24hr.   - Continue Lantus 25 units daily.   - Stop SSI as patient will be unable to follow this at home and BGs well controlled with lantus   - Needs diabetic education and PCP prior to d/c. May be able to start oral regimen as outpatient.      Provoked DVT. Developed non occlusive clot in left IJ and left common femoral vein during acute hospitalization. Continue warfarin w/ INR goal 2-3 for anticipated 3-6 mo course. INR 2.73 today.      Non Severe Protein Calorie Malnutrition. Feeding tube d/c'd 5/18. Nutrition following.      Sinus Tachycardia. Persistent throughout acute hospitalization and ARU course. Attributed to deconditioning and recovering cardiac function. Not on beta blocker d/t recent HF exacerbation. Will be followed OP.      CVA. Developed left hand weakness and pain on 4/29 prompting stroke code. Head/neck CTA showed no hemorrhage but new areas of low attenuation in b/l hemispheres. Neuro consulted. On Plavix and statin for prevention.     Insomnia. Patient reports inability to sleep for a few nights now. Has not tried any medications. Will start melatonin 3mg qhs. If ineffective, can trial trazodone.     Hypokalemia. K slightly low at  "3.3 today. Patient is on lisinopril and aldactone which increase K and bumex which decreases K. Given lisinopril was just restarted yesterday, will repeat K tomorrow. If K still low, will consider supplementation since there is an increase risk of toxicity with hypokalemia.      Resolved Problems   Lower GIB. Maroon BMs starting 4/11 w/ drop in hgb by ~1.5 g. Colonoscopy 4/17 showed rectal ulcer thought 2/2 tube but no lesions to intervene upon. Hgb stable w/o recurrence since. On Protonix for prophylaxis.   Hypoxic Respiratory Failure. Intubated 3/26-4/3 in setting of cardiogenic shock, and then re-intubated 4/25-4/28 d/t worsening hypoxia thought 2/2 aspiration PNA vs HCAP. Completed Zosyn 4/19-4/26, vanco 4/20-4/29, meropenem 4/26-5/2. Now on RA.      Medicine will follow.        Interval History:   Luke is doing okay today. He states he feels \"normal.\" he reports he does not feel volume overloaded. Denies any chest pain, sob, or dyspnea. He does reports difficulty sleeping, which has been ongoing for several days. He is agreeable to try something for this. He thinks voiding is improving. No other concerns at this time.          Physical Exam:   Vitals were reviewed  Blood pressure 117/80, pulse 104, temperature 96.9  F (36.1  C), temperature source Oral, resp. rate 18, height 1.524 m (5'), weight 55.3 kg (122 lb), SpO2 99 %.  General:alert, NAD, resting in bed, pleasant and interactive.   HEENT: NCAT, anicteric sclera, EOMI, MMM. No JVF appreciated.   Cardiovascular: RRR, S1S2, no m/r/g  Lungs:effort normal on RA, lungs CTAB, no wheezing or crackles  Abdomen: normoactive BS, soft with no distention and no tenderness   Vascular: no peripheral edema, distal pulses palpable  Neurologic: AAO X 3, CN 2-12 grossly intact, no focal deficits  Skin: no jaundice, rashes, or lesions on exposed areas of skin.       Fiona Zayas Sangeetha  Internal Medicine [facility] Hospitalist  [phone]    "

## 2023-01-10 ENCOUNTER — OFFICE VISIT (OUTPATIENT)
Dept: INTERNAL MEDICINE | Facility: CLINIC | Age: 58
End: 2023-01-10
Payer: COMMERCIAL

## 2023-01-10 VITALS
RESPIRATION RATE: 14 BRPM | SYSTOLIC BLOOD PRESSURE: 128 MMHG | WEIGHT: 136.8 LBS | OXYGEN SATURATION: 99 % | HEART RATE: 71 BPM | HEIGHT: 61 IN | BODY MASS INDEX: 25.83 KG/M2 | DIASTOLIC BLOOD PRESSURE: 84 MMHG | TEMPERATURE: 97.9 F

## 2023-01-10 DIAGNOSIS — I10 BENIGN ESSENTIAL HYPERTENSION: ICD-10-CM

## 2023-01-10 DIAGNOSIS — I50.22 CHRONIC SYSTOLIC CONGESTIVE HEART FAILURE (H): ICD-10-CM

## 2023-01-10 DIAGNOSIS — I25.10 CORONARY ARTERY DISEASE INVOLVING NATIVE CORONARY ARTERY OF NATIVE HEART WITHOUT ANGINA PECTORIS: ICD-10-CM

## 2023-01-10 DIAGNOSIS — E11.59 TYPE 2 DIABETES MELLITUS WITH OTHER CIRCULATORY COMPLICATION, WITHOUT LONG-TERM CURRENT USE OF INSULIN (H): Primary | ICD-10-CM

## 2023-01-10 LAB
ANION GAP SERPL CALCULATED.3IONS-SCNC: 13 MMOL/L (ref 7–15)
BUN SERPL-MCNC: 22.5 MG/DL (ref 6–20)
CALCIUM SERPL-MCNC: 8.9 MG/DL (ref 8.6–10)
CHLORIDE SERPL-SCNC: 106 MMOL/L (ref 98–107)
CREAT SERPL-MCNC: 1.16 MG/DL (ref 0.67–1.17)
DEPRECATED HCO3 PLAS-SCNC: 22 MMOL/L (ref 22–29)
GFR SERPL CREATININE-BSD FRML MDRD: 74 ML/MIN/1.73M2
GLUCOSE SERPL-MCNC: 127 MG/DL (ref 70–99)
HBA1C MFR BLD: 6.4 % (ref 0–5.6)
POTASSIUM SERPL-SCNC: 4.6 MMOL/L (ref 3.4–5.3)
SODIUM SERPL-SCNC: 141 MMOL/L (ref 136–145)

## 2023-01-10 PROCEDURE — 36415 COLL VENOUS BLD VENIPUNCTURE: CPT | Performed by: INTERNAL MEDICINE

## 2023-01-10 PROCEDURE — 83036 HEMOGLOBIN GLYCOSYLATED A1C: CPT | Performed by: INTERNAL MEDICINE

## 2023-01-10 PROCEDURE — 80048 BASIC METABOLIC PNL TOTAL CA: CPT | Performed by: INTERNAL MEDICINE

## 2023-01-10 PROCEDURE — 99214 OFFICE O/P EST MOD 30 MIN: CPT | Performed by: INTERNAL MEDICINE

## 2023-01-10 NOTE — PROGRESS NOTES
"  Assessment & Plan     Type 2 diabetes mellitus with other circulatory complication, without long-term current use of insulin (H)  Appears to have better blood sugar control on current regimen.  Recheck A1c and follow-up with primary provider  - Hemoglobin A1c; Future  - Basic metabolic panel  (Ca, Cl, CO2, Creat, Gluc, K, Na, BUN); Future    Benign essential hypertension  Stable and continue with current medical manage  - Basic metabolic panel  (Ca, Cl, CO2, Creat, Gluc, K, Na, BUN); Future    Coronary artery disease involving native coronary artery of native heart without angina pectoris  Continue with risk reduction therapy    Chronic systolic congestive heart failure (H)  Appears overall euvolemic.  Continue with medical management and follow-up with cardiology as directed.    Patient was advised to follow-up for routine vaccinations per recommendations.     BMI:   Estimated body mass index is 25.64 kg/m  as calculated from the following:    Height as of this encounter: 1.556 m (5' 1.25\").    Weight as of this encounter: 62.1 kg (136 lb 12.8 oz).       See Patient Instructions    Return in about 3 months (around 4/10/2023) for follow-up with regular primary care provider, diabetes check 6 months.    Glenn Brown MD  [facility]    Qing Butler is a 57 year old accompanied by his , presenting for the following health issues:    Diabetes      HPI     Luke Henao has never seen me prior.  The patient was actually surprised to see me as he thought he was seeing his primary physician Dr. Church.    Patient states that his blood sugars have been under much better control since been on the Januvia.  He denies any major complaints.  He has active refills.  He denies any significant issues with hypoglycemia.    The  service was not very reliable as the connection was poor.  Patient did speak enough broken English where we managed to get through the clinic visit " without a whole lot of issues.    Patient was started about 3 months ago on Januvia due to a slightly elevated A1c level.  He has been taking his metformin and Farxiga in addition to his lipid management and cardiac medications.    Diabetes Follow-up    How often are you checking your blood sugar? One time daily  What time of day are you checking your blood sugars (select all that apply)?  Before meals  Have you had any blood sugars above 200?  No  Have you had any blood sugars below 70?  No    What symptoms do you notice when your blood sugar is low?  None    What concerns do you have today about your diabetes? None     Do you have any of these symptoms? (Select all that apply)  No numbness or tingling in feet.  No redness, sores or blisters on feet.  No complaints of excessive thirst.  No reports of blurry vision.  No significant changes to weight.      BP Readings from Last 2 Encounters:   11/17/22 130/81   10/10/22 118/80     Hemoglobin A1C (%)   Date Value   10/10/2022 7.8 (H)   04/08/2022 7.1 (H)   05/19/2021 6.8 (H)   10/12/2020 6.4 (H)     LDL Cholesterol Calculated (mg/dL)   Date Value   04/04/2022 86   05/19/2021 63   10/12/2020 73     Current Outpatient Medications   Medication     ASPIRIN LOW DOSE 81 MG chewable tablet     atorvastatin (LIPITOR) 40 MG tablet     bumetanide (BUMEX) 0.5 MG tablet     cetirizine (ZYRTEC) 10 MG tablet     co-enzyme Q-10 100 MG CAPS capsule     FARXIGA 10 MG TABS tablet     metFORMIN (GLUCOPHAGE-XR) 500 MG 24 hr tablet     metoprolol succinate ER (TOPROL XL) 200 MG 24 hr tablet     ONETOUCH ULTRA test strip     sacubitril-valsartan (ENTRESTO) 49-51 MG per tablet     sitagliptin (JANUVIA) 100 MG tablet     No current facility-administered medications for this visit.         Review of Systems   CONSTITUTIONAL: NEGATIVE for fever, chills, change in weight  EYES: NEGATIVE for vision changes or irritation  ENT/MOUTH: NEGATIVE for ear, mouth and throat problems  RESP: NEGATIVE for  "significant cough or SOB  CV: NEGATIVE for chest pain, palpitations or peripheral edema  GI: NEGATIVE for nausea, abdominal pain, heartburn, or change in bowel habits  : NEGATIVE for frequency, dysuria, or hematuria  MUSCULOSKELETAL: NEGATIVE for significant arthralgias or myalgia  NEURO: NEGATIVE for weakness, dizziness or paresthesias  HEME: NEGATIVE for bleeding problems  PSYCHIATRIC: NEGATIVE for changes in mood or affect    Current Outpatient Medications   Medication     ASPIRIN LOW DOSE 81 MG chewable tablet     atorvastatin (LIPITOR) 40 MG tablet     bumetanide (BUMEX) 0.5 MG tablet     cetirizine (ZYRTEC) 10 MG tablet     co-enzyme Q-10 100 MG CAPS capsule     FARXIGA 10 MG TABS tablet     metFORMIN (GLUCOPHAGE-XR) 500 MG 24 hr tablet     metoprolol succinate ER (TOPROL XL) 200 MG 24 hr tablet     ONETOUCH ULTRA test strip     sacubitril-valsartan (ENTRESTO) 49-51 MG per tablet     sitagliptin (JANUVIA) 100 MG tablet     No current facility-administered medications for this visit.           Objective    /84   Pulse 71   Temp 97.9  F (36.6  C) (Temporal)   Resp 14   Ht 1.556 m (5' 1.25\")   Wt 62.1 kg (136 lb 12.8 oz)   SpO2 99%   BMI 25.64 kg/m    Body mass index is 25.64 kg/m .  Physical Exam   GENERAL: alert and no distress  EYES: Eyes grossly normal to inspection, PERRL and conjunctivae and sclerae normal  HENT: ear canals and TM's normal, nose and mouth without ulcers or lesions  RESP: lungs clear to auscultation - no rales, rhonchi or wheezes  CV: regular rate and rhythm, normal S1 S2, no S3 or S4, no murmur, click or rub  MS: no gross musculoskeletal defects noted  NEURO: No focal changes  PSYCH: mentation appears normal, affect normal/bright    Lab Results   Component Value Date    A1C 7.8 10/10/2022    A1C 7.1 04/08/2022    A1C 6.8 05/19/2021    A1C 6.4 10/12/2020    A1C 6.8 04/02/2019    A1C 6.6 10/01/2018    A1C 6.0 09/26/2017                   "

## 2023-01-14 DIAGNOSIS — I50.22 CHRONIC SYSTOLIC CONGESTIVE HEART FAILURE (H): ICD-10-CM

## 2023-01-16 ENCOUNTER — APPOINTMENT (OUTPATIENT)
Dept: INTERPRETER SERVICES | Facility: CLINIC | Age: 58
End: 2023-01-16
Payer: COMMERCIAL

## 2023-01-16 RX ORDER — SACUBITRIL AND VALSARTAN 49; 51 MG/1; MG/1
TABLET, FILM COATED ORAL
Qty: 270 TABLET | Refills: 1 | Status: SHIPPED | OUTPATIENT
Start: 2023-01-16 | End: 2023-01-27

## 2023-01-16 NOTE — TELEPHONE ENCOUNTER
01/16/2023 Pt called in to find out when the medication will be refilled. He took his last pill last night please send medication to Pharm and call Pt to let him know that it has been sent.

## 2023-01-16 NOTE — TELEPHONE ENCOUNTER
Last seen in clinic 11/2022 and plan to continue current dose of Entresto. Refill approved. Called Javiertrish via  to let him know refill is at his pharmacy. lesley left on voicemail.

## 2023-01-19 ENCOUNTER — ANCILLARY PROCEDURE (OUTPATIENT)
Dept: CARDIOLOGY | Facility: CLINIC | Age: 58
End: 2023-01-19
Attending: INTERNAL MEDICINE
Payer: COMMERCIAL

## 2023-01-19 DIAGNOSIS — I42.9 CARDIOMYOPATHY (H): ICD-10-CM

## 2023-01-19 PROCEDURE — 93296 REM INTERROG EVL PM/IDS: CPT

## 2023-01-19 PROCEDURE — 93295 DEV INTERROG REMOTE 1/2/MLT: CPT | Performed by: INTERNAL MEDICINE

## 2023-01-20 LAB
MDC_IDC_LEAD_IMPLANT_DT: NORMAL
MDC_IDC_LEAD_LOCATION: NORMAL
MDC_IDC_LEAD_LOCATION_DETAIL_1: NORMAL
MDC_IDC_LEAD_MFG: NORMAL
MDC_IDC_LEAD_MODEL: NORMAL
MDC_IDC_LEAD_POLARITY_TYPE: NORMAL
MDC_IDC_LEAD_SERIAL: NORMAL
MDC_IDC_LEAD_SPECIAL_FUNCTION: NORMAL
MDC_IDC_MSMT_BATTERY_DTM: NORMAL
MDC_IDC_MSMT_BATTERY_REMAINING_LONGEVITY: 89 MO
MDC_IDC_MSMT_BATTERY_RRT_TRIGGER: 2.73
MDC_IDC_MSMT_BATTERY_STATUS: NORMAL
MDC_IDC_MSMT_BATTERY_VOLTAGE: 3 V
MDC_IDC_MSMT_CAP_CHARGE_DTM: NORMAL
MDC_IDC_MSMT_CAP_CHARGE_ENERGY: 18 J
MDC_IDC_MSMT_CAP_CHARGE_TIME: 3.68
MDC_IDC_MSMT_CAP_CHARGE_TYPE: NORMAL
MDC_IDC_MSMT_LEADCHNL_RV_IMPEDANCE_VALUE: 266 OHM
MDC_IDC_MSMT_LEADCHNL_RV_IMPEDANCE_VALUE: 342 OHM
MDC_IDC_MSMT_LEADCHNL_RV_PACING_THRESHOLD_AMPLITUDE: 0.5 V
MDC_IDC_MSMT_LEADCHNL_RV_PACING_THRESHOLD_PULSEWIDTH: 0.4 MS
MDC_IDC_MSMT_LEADCHNL_RV_SENSING_INTR_AMPL: 7.5 MV
MDC_IDC_PG_IMPLANT_DTM: NORMAL
MDC_IDC_PG_MFG: NORMAL
MDC_IDC_PG_MODEL: NORMAL
MDC_IDC_PG_SERIAL: NORMAL
MDC_IDC_PG_TYPE: NORMAL
MDC_IDC_SESS_CLINIC_NAME: NORMAL
MDC_IDC_SESS_DTM: NORMAL
MDC_IDC_SESS_TYPE: NORMAL
MDC_IDC_SET_BRADY_HYSTRATE: NORMAL
MDC_IDC_SET_BRADY_LOWRATE: 40 {BEATS}/MIN
MDC_IDC_SET_BRADY_MODE: NORMAL
MDC_IDC_SET_LEADCHNL_RV_PACING_AMPLITUDE: 2 V
MDC_IDC_SET_LEADCHNL_RV_PACING_ANODE_ELECTRODE_1: NORMAL
MDC_IDC_SET_LEADCHNL_RV_PACING_ANODE_LOCATION_1: NORMAL
MDC_IDC_SET_LEADCHNL_RV_PACING_CAPTURE_MODE: NORMAL
MDC_IDC_SET_LEADCHNL_RV_PACING_CATHODE_ELECTRODE_1: NORMAL
MDC_IDC_SET_LEADCHNL_RV_PACING_CATHODE_LOCATION_1: NORMAL
MDC_IDC_SET_LEADCHNL_RV_PACING_POLARITY: NORMAL
MDC_IDC_SET_LEADCHNL_RV_PACING_PULSEWIDTH: 0.4 MS
MDC_IDC_SET_LEADCHNL_RV_SENSING_ANODE_ELECTRODE_1: NORMAL
MDC_IDC_SET_LEADCHNL_RV_SENSING_ANODE_LOCATION_1: NORMAL
MDC_IDC_SET_LEADCHNL_RV_SENSING_CATHODE_ELECTRODE_1: NORMAL
MDC_IDC_SET_LEADCHNL_RV_SENSING_CATHODE_LOCATION_1: NORMAL
MDC_IDC_SET_LEADCHNL_RV_SENSING_POLARITY: NORMAL
MDC_IDC_SET_LEADCHNL_RV_SENSING_SENSITIVITY: 0.3 MV
MDC_IDC_SET_ZONE_DETECTION_BEATS_DENOMINATOR: 40 {BEATS}
MDC_IDC_SET_ZONE_DETECTION_BEATS_NUMERATOR: 30 {BEATS}
MDC_IDC_SET_ZONE_DETECTION_INTERVAL: 300 MS
MDC_IDC_SET_ZONE_DETECTION_INTERVAL: 360 MS
MDC_IDC_SET_ZONE_DETECTION_INTERVAL: 390 MS
MDC_IDC_SET_ZONE_DETECTION_INTERVAL: NORMAL
MDC_IDC_SET_ZONE_TYPE: NORMAL
MDC_IDC_STAT_AT_BURDEN_PERCENT: 0 %
MDC_IDC_STAT_AT_DTM_END: NORMAL
MDC_IDC_STAT_AT_DTM_START: NORMAL
MDC_IDC_STAT_BRADY_DTM_END: NORMAL
MDC_IDC_STAT_BRADY_DTM_START: NORMAL
MDC_IDC_STAT_BRADY_RV_PERCENT_PACED: 0.01 %
MDC_IDC_STAT_EPISODE_RECENT_COUNT: 0
MDC_IDC_STAT_EPISODE_RECENT_COUNT_DTM_END: NORMAL
MDC_IDC_STAT_EPISODE_RECENT_COUNT_DTM_START: NORMAL
MDC_IDC_STAT_EPISODE_TOTAL_COUNT: 0
MDC_IDC_STAT_EPISODE_TOTAL_COUNT_DTM_END: NORMAL
MDC_IDC_STAT_EPISODE_TOTAL_COUNT_DTM_START: NORMAL
MDC_IDC_STAT_EPISODE_TYPE: NORMAL
MDC_IDC_STAT_TACHYTHERAPY_ATP_DELIVERED_RECENT: 0
MDC_IDC_STAT_TACHYTHERAPY_ATP_DELIVERED_TOTAL: 0
MDC_IDC_STAT_TACHYTHERAPY_RECENT_DTM_END: NORMAL
MDC_IDC_STAT_TACHYTHERAPY_RECENT_DTM_START: NORMAL
MDC_IDC_STAT_TACHYTHERAPY_SHOCKS_ABORTED_RECENT: 0
MDC_IDC_STAT_TACHYTHERAPY_SHOCKS_ABORTED_TOTAL: 0
MDC_IDC_STAT_TACHYTHERAPY_SHOCKS_DELIVERED_RECENT: 0
MDC_IDC_STAT_TACHYTHERAPY_SHOCKS_DELIVERED_TOTAL: 0
MDC_IDC_STAT_TACHYTHERAPY_TOTAL_DTM_END: NORMAL
MDC_IDC_STAT_TACHYTHERAPY_TOTAL_DTM_START: NORMAL

## 2023-01-27 ENCOUNTER — TELEPHONE (OUTPATIENT)
Dept: CARDIOLOGY | Facility: CLINIC | Age: 58
End: 2023-01-27
Payer: COMMERCIAL

## 2023-01-27 ENCOUNTER — PATIENT OUTREACH (OUTPATIENT)
Dept: CARDIOLOGY | Facility: CLINIC | Age: 58
End: 2023-01-27
Payer: COMMERCIAL

## 2023-01-27 ENCOUNTER — APPOINTMENT (OUTPATIENT)
Dept: INTERPRETER SERVICES | Facility: CLINIC | Age: 58
End: 2023-01-27
Payer: COMMERCIAL

## 2023-01-27 DIAGNOSIS — I50.22 CHRONIC SYSTOLIC CONGESTIVE HEART FAILURE (H): ICD-10-CM

## 2023-01-27 RX ORDER — SACUBITRIL AND VALSARTAN 49; 51 MG/1; MG/1
1 TABLET, FILM COATED ORAL 2 TIMES DAILY
Qty: 180 TABLET | Refills: 1 | Status: SHIPPED | OUTPATIENT
Start: 2023-01-27 | End: 2023-05-24

## 2023-01-27 NOTE — TELEPHONE ENCOUNTER
Prior Authorization Retail Medication Request    Medication/Dose: Entresto - 49/51 mg tabs -   ICD code (if different than what is on RX):  I50.22  Previously Tried and Failed:  N/a - has been on this dose. Seeking approval for 3 pills per day (1 tab in AM, 2 tabs in PM). Currently tolerating this dose well.   Rationale:     Insurance Name:    Insurance ID:        Pharmacy Information (if different than what is on RX)  Name:    Phone:

## 2023-01-27 NOTE — TELEPHONE ENCOUNTER
Spoke with patient. He is actually taking 49/51 mg - 1 tab BID, not  1 tab in AM, 2 tabs in PM. Reports he's been taking it that way for awhile.   Per last cardiology visit nte:  Entresto 49/51 mg po BID, intolerant to prior increase. Last BP check 128/84 at PCP on 1/10/23.     Will send as 49/51 mg BID per last clinic visit note and matches what patient is currently taking. Will FYI provider.

## 2023-01-27 NOTE — TELEPHONE ENCOUNTER
Notice from [facility] pharmacy that PA is needed for Entresto Currently prescribed as 49/51 mg pills - Take 1 tab in AM, 2 tabs in PM. Per insurance won't cover more than 2 tabs per day.   Can send 97/103 mg tabs with instructions to take half tablet in AM, full tablet in PM. Want to review with patient first before sending.   Will also start a PA to get current dose approved.       Called via , left message asking him to call back my direct line. Soraya Messina RN

## 2023-01-27 NOTE — TELEPHONE ENCOUNTER
Can cancel prior auth request. Spoke with patient and he is only taking 1 tab BID. Per last cardiology note:  Entresto 49/51 mg po BID, intolerant to prior increase. This matches what patient is taking at home. Last BP on 1/10 at /84. Refill sent.

## 2023-01-30 RX ORDER — SACUBITRIL AND VALSARTAN 49; 51 MG/1; MG/1
TABLET, FILM COATED ORAL
Qty: 270 TABLET | Refills: 1 | OUTPATIENT
Start: 2023-01-30

## 2023-02-17 DIAGNOSIS — Z91.09 ENVIRONMENTAL ALLERGIES: ICD-10-CM

## 2023-02-20 RX ORDER — CETIRIZINE HYDROCHLORIDE 10 MG/1
TABLET ORAL
Qty: 90 TABLET | Refills: 1 | Status: SHIPPED | OUTPATIENT
Start: 2023-02-20 | End: 2023-08-18

## 2023-02-22 ENCOUNTER — TELEPHONE (OUTPATIENT)
Dept: CARDIOLOGY | Facility: CLINIC | Age: 58
End: 2023-02-22

## 2023-03-24 DIAGNOSIS — E11.9 TYPE 2 DIABETES MELLITUS WITHOUT COMPLICATION, WITHOUT LONG-TERM CURRENT USE OF INSULIN (H): ICD-10-CM

## 2023-03-24 RX ORDER — METFORMIN HCL 500 MG
1000 TABLET, EXTENDED RELEASE 24 HR ORAL
Qty: 180 TABLET | Refills: 3 | Status: SHIPPED | OUTPATIENT
Start: 2023-03-24 | End: 2024-03-11

## 2023-04-25 DIAGNOSIS — I50.22 CHRONIC SYSTOLIC CONGESTIVE HEART FAILURE (H): ICD-10-CM

## 2023-04-25 RX ORDER — METOPROLOL SUCCINATE 200 MG/1
TABLET, EXTENDED RELEASE ORAL
Qty: 90 TABLET | Refills: 1 | Status: SHIPPED | OUTPATIENT
Start: 2023-04-25 | End: 2023-08-29 | Stop reason: DRUGHIGH

## 2023-05-24 ENCOUNTER — CARE COORDINATION (OUTPATIENT)
Dept: CARDIOLOGY | Facility: CLINIC | Age: 58
End: 2023-05-24

## 2023-05-24 ENCOUNTER — ANCILLARY PROCEDURE (OUTPATIENT)
Dept: CARDIOLOGY | Facility: CLINIC | Age: 58
End: 2023-05-24
Attending: NURSE PRACTITIONER
Payer: COMMERCIAL

## 2023-05-24 ENCOUNTER — LAB (OUTPATIENT)
Dept: LAB | Facility: CLINIC | Age: 58
End: 2023-05-24
Payer: COMMERCIAL

## 2023-05-24 ENCOUNTER — ANCILLARY ORDERS (OUTPATIENT)
Dept: CARDIOLOGY | Facility: CLINIC | Age: 58
End: 2023-05-24

## 2023-05-24 ENCOUNTER — ANCILLARY PROCEDURE (OUTPATIENT)
Dept: CARDIOLOGY | Facility: CLINIC | Age: 58
End: 2023-05-24
Attending: INTERNAL MEDICINE
Payer: COMMERCIAL

## 2023-05-24 VITALS
OXYGEN SATURATION: 97 % | SYSTOLIC BLOOD PRESSURE: 126 MMHG | HEART RATE: 69 BPM | WEIGHT: 135.4 LBS | DIASTOLIC BLOOD PRESSURE: 82 MMHG | BODY MASS INDEX: 25.38 KG/M2

## 2023-05-24 DIAGNOSIS — I50.22 CHRONIC SYSTOLIC CONGESTIVE HEART FAILURE (H): ICD-10-CM

## 2023-05-24 DIAGNOSIS — E11.9 TYPE 2 DIABETES MELLITUS (H): ICD-10-CM

## 2023-05-24 DIAGNOSIS — I42.9 CARDIOMYOPATHY (H): ICD-10-CM

## 2023-05-24 DIAGNOSIS — I25.10 CAD (CORONARY ARTERY DISEASE): ICD-10-CM

## 2023-05-24 DIAGNOSIS — I50.22 CHRONIC SYSTOLIC CONGESTIVE HEART FAILURE (H): Primary | ICD-10-CM

## 2023-05-24 LAB
ANION GAP SERPL CALCULATED.3IONS-SCNC: 12 MMOL/L (ref 7–15)
BUN SERPL-MCNC: 25.2 MG/DL (ref 6–20)
CALCIUM SERPL-MCNC: 8.9 MG/DL (ref 8.6–10)
CHLORIDE SERPL-SCNC: 103 MMOL/L (ref 98–107)
CHOLEST SERPL-MCNC: 121 MG/DL
CREAT SERPL-MCNC: 1.29 MG/DL (ref 0.67–1.17)
DEPRECATED HCO3 PLAS-SCNC: 22 MMOL/L (ref 22–29)
GFR SERPL CREATININE-BSD FRML MDRD: 65 ML/MIN/1.73M2
GLUCOSE SERPL-MCNC: 172 MG/DL (ref 70–99)
HDLC SERPL-MCNC: 37 MG/DL
LDLC SERPL CALC-MCNC: 61 MG/DL
LVEF ECHO: NORMAL
MDC_IDC_LEAD_IMPLANT_DT: NORMAL
MDC_IDC_LEAD_LOCATION: NORMAL
MDC_IDC_LEAD_LOCATION_DETAIL_1: NORMAL
MDC_IDC_LEAD_MFG: NORMAL
MDC_IDC_LEAD_MODEL: NORMAL
MDC_IDC_LEAD_POLARITY_TYPE: NORMAL
MDC_IDC_LEAD_SERIAL: NORMAL
MDC_IDC_LEAD_SPECIAL_FUNCTION: NORMAL
MDC_IDC_MSMT_BATTERY_DTM: NORMAL
MDC_IDC_MSMT_BATTERY_REMAINING_LONGEVITY: 82 MO
MDC_IDC_MSMT_BATTERY_RRT_TRIGGER: 2.73
MDC_IDC_MSMT_BATTERY_STATUS: NORMAL
MDC_IDC_MSMT_BATTERY_VOLTAGE: 2.99 V
MDC_IDC_MSMT_CAP_CHARGE_DTM: NORMAL
MDC_IDC_MSMT_CAP_CHARGE_ENERGY: 18 J
MDC_IDC_MSMT_CAP_CHARGE_TIME: 3.74
MDC_IDC_MSMT_CAP_CHARGE_TYPE: NORMAL
MDC_IDC_MSMT_LEADCHNL_RV_IMPEDANCE_VALUE: 266 OHM
MDC_IDC_MSMT_LEADCHNL_RV_IMPEDANCE_VALUE: 342 OHM
MDC_IDC_MSMT_LEADCHNL_RV_PACING_THRESHOLD_AMPLITUDE: 0.5 V
MDC_IDC_MSMT_LEADCHNL_RV_PACING_THRESHOLD_PULSEWIDTH: 0.4 MS
MDC_IDC_MSMT_LEADCHNL_RV_SENSING_INTR_AMPL: 7.75 MV
MDC_IDC_PG_IMPLANT_DTM: NORMAL
MDC_IDC_PG_MFG: NORMAL
MDC_IDC_PG_MODEL: NORMAL
MDC_IDC_PG_SERIAL: NORMAL
MDC_IDC_PG_TYPE: NORMAL
MDC_IDC_SESS_CLINIC_NAME: NORMAL
MDC_IDC_SESS_DTM: NORMAL
MDC_IDC_SESS_TYPE: NORMAL
MDC_IDC_SET_BRADY_HYSTRATE: NORMAL
MDC_IDC_SET_BRADY_LOWRATE: 40 {BEATS}/MIN
MDC_IDC_SET_BRADY_MODE: NORMAL
MDC_IDC_SET_LEADCHNL_RV_PACING_AMPLITUDE: 2 V
MDC_IDC_SET_LEADCHNL_RV_PACING_ANODE_ELECTRODE_1: NORMAL
MDC_IDC_SET_LEADCHNL_RV_PACING_ANODE_LOCATION_1: NORMAL
MDC_IDC_SET_LEADCHNL_RV_PACING_CAPTURE_MODE: NORMAL
MDC_IDC_SET_LEADCHNL_RV_PACING_CATHODE_ELECTRODE_1: NORMAL
MDC_IDC_SET_LEADCHNL_RV_PACING_CATHODE_LOCATION_1: NORMAL
MDC_IDC_SET_LEADCHNL_RV_PACING_POLARITY: NORMAL
MDC_IDC_SET_LEADCHNL_RV_PACING_PULSEWIDTH: 0.4 MS
MDC_IDC_SET_LEADCHNL_RV_SENSING_ANODE_ELECTRODE_1: NORMAL
MDC_IDC_SET_LEADCHNL_RV_SENSING_ANODE_LOCATION_1: NORMAL
MDC_IDC_SET_LEADCHNL_RV_SENSING_CATHODE_ELECTRODE_1: NORMAL
MDC_IDC_SET_LEADCHNL_RV_SENSING_CATHODE_LOCATION_1: NORMAL
MDC_IDC_SET_LEADCHNL_RV_SENSING_POLARITY: NORMAL
MDC_IDC_SET_LEADCHNL_RV_SENSING_SENSITIVITY: 0.3 MV
MDC_IDC_SET_ZONE_DETECTION_BEATS_DENOMINATOR: 40 {BEATS}
MDC_IDC_SET_ZONE_DETECTION_BEATS_NUMERATOR: 30 {BEATS}
MDC_IDC_SET_ZONE_DETECTION_INTERVAL: 300 MS
MDC_IDC_SET_ZONE_DETECTION_INTERVAL: 360 MS
MDC_IDC_SET_ZONE_DETECTION_INTERVAL: 390 MS
MDC_IDC_SET_ZONE_DETECTION_INTERVAL: NORMAL
MDC_IDC_SET_ZONE_TYPE: NORMAL
MDC_IDC_STAT_AT_BURDEN_PERCENT: 0 %
MDC_IDC_STAT_AT_DTM_END: NORMAL
MDC_IDC_STAT_AT_DTM_START: NORMAL
MDC_IDC_STAT_BRADY_DTM_END: NORMAL
MDC_IDC_STAT_BRADY_DTM_START: NORMAL
MDC_IDC_STAT_BRADY_RV_PERCENT_PACED: 0.01 %
MDC_IDC_STAT_EPISODE_RECENT_COUNT: 0
MDC_IDC_STAT_EPISODE_RECENT_COUNT_DTM_END: NORMAL
MDC_IDC_STAT_EPISODE_RECENT_COUNT_DTM_START: NORMAL
MDC_IDC_STAT_EPISODE_TOTAL_COUNT: 0
MDC_IDC_STAT_EPISODE_TOTAL_COUNT_DTM_END: NORMAL
MDC_IDC_STAT_EPISODE_TOTAL_COUNT_DTM_START: NORMAL
MDC_IDC_STAT_EPISODE_TYPE: NORMAL
MDC_IDC_STAT_TACHYTHERAPY_ATP_DELIVERED_RECENT: 0
MDC_IDC_STAT_TACHYTHERAPY_ATP_DELIVERED_TOTAL: 0
MDC_IDC_STAT_TACHYTHERAPY_RECENT_DTM_END: NORMAL
MDC_IDC_STAT_TACHYTHERAPY_RECENT_DTM_START: NORMAL
MDC_IDC_STAT_TACHYTHERAPY_SHOCKS_ABORTED_RECENT: 0
MDC_IDC_STAT_TACHYTHERAPY_SHOCKS_ABORTED_TOTAL: 0
MDC_IDC_STAT_TACHYTHERAPY_SHOCKS_DELIVERED_RECENT: 0
MDC_IDC_STAT_TACHYTHERAPY_SHOCKS_DELIVERED_TOTAL: 0
MDC_IDC_STAT_TACHYTHERAPY_TOTAL_DTM_END: NORMAL
MDC_IDC_STAT_TACHYTHERAPY_TOTAL_DTM_START: NORMAL
NONHDLC SERPL-MCNC: 84 MG/DL
POTASSIUM SERPL-SCNC: 5 MMOL/L (ref 3.4–5.3)
SODIUM SERPL-SCNC: 137 MMOL/L (ref 136–145)
TRIGL SERPL-MCNC: 115 MG/DL
TSH SERPL DL<=0.005 MIU/L-ACNC: 2.88 UIU/ML (ref 0.3–4.2)

## 2023-05-24 PROCEDURE — 36415 COLL VENOUS BLD VENIPUNCTURE: CPT | Performed by: PATHOLOGY

## 2023-05-24 PROCEDURE — 93306 TTE W/DOPPLER COMPLETE: CPT | Performed by: INTERNAL MEDICINE

## 2023-05-24 PROCEDURE — 84443 ASSAY THYROID STIM HORMONE: CPT | Performed by: PATHOLOGY

## 2023-05-24 PROCEDURE — 80061 LIPID PANEL: CPT | Performed by: PATHOLOGY

## 2023-05-24 PROCEDURE — G0463 HOSPITAL OUTPT CLINIC VISIT: HCPCS | Performed by: NURSE PRACTITIONER

## 2023-05-24 PROCEDURE — 93282 PRGRMG EVAL IMPLANTABLE DFB: CPT | Performed by: INTERNAL MEDICINE

## 2023-05-24 PROCEDURE — 80048 BASIC METABOLIC PNL TOTAL CA: CPT | Performed by: PATHOLOGY

## 2023-05-24 PROCEDURE — 99214 OFFICE O/P EST MOD 30 MIN: CPT | Mod: 25 | Performed by: NURSE PRACTITIONER

## 2023-05-24 RX ORDER — SACUBITRIL AND VALSARTAN 49; 51 MG/1; MG/1
1 TABLET, FILM COATED ORAL 2 TIMES DAILY
Qty: 180 TABLET | Refills: 3 | Status: SHIPPED | OUTPATIENT
Start: 2023-05-24 | End: 2023-10-23 | Stop reason: DRUGHIGH

## 2023-05-24 ASSESSMENT — PAIN SCALES - GENERAL: PAINLEVEL: NO PAIN (0)

## 2023-05-24 NOTE — PATIENT INSTRUCTIONS
It was a pleasure to see you in clinic today, please do not hesitate to call us for any questions or concerns.  Your next automatic remote ICD check is scheduled for 8/24/2023, we will plan to see you back in clinic in 1 year or with your next cardiology provider visit.    Bertha Hamm RN    Electrophysiology Nurse Clinician  [facility]    During Business Hours Please Call:  [phone]  After Hours Please Call:  [phone] - select option #4 and ask for job code [id number]

## 2023-05-24 NOTE — PROGRESS NOTES
HPI:   Mr. 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 He presents to [facility] for routine follow up.     Repeat echocardiogram notes unchanged reduced EF at 10-20%. He continues to feel well with mild LE edema at the end of his 9-10 hour work day that resolves with an additional Bumex. He denies fever, chills, lightheadedness, dizziness, chest pain, palpitations, SOB, RAYMOND, PND, orthopnea, nausea, vomiting, and diarrhea. His weight remains stable at 135 lbs. He continues to follow a low sodium diet.      PAST MEDICAL HISTORY:  Past Medical History:   Diagnosis Date     Benign essential hypertension      CAD (coronary artery disease) 2017    RCA STEMI s/p balloon angioplasty and multiple Sofie to RCA, LCx, and LAD     Chronic systolic congestive heart failure (H)      History of deep venous thrombosis 2017    left internal jugular (line-associated); left common femoral; was on coumadin     History of mitral valve repair 2017     History of stroke 2017    no residual deficits     ICD (implantable cardioverter-defibrillator) in place      Type 2 diabetes mellitus (H)        FAMILY HISTORY:  Family History   Problem Relation Age of Onset     Hypertension Mother      Diabetes Type 2  Father      Hypertension Father      Myocardial Infarction No family hx of      Cerebrovascular Disease No family hx of      Coronary Artery Disease Early Onset No family hx of      Colon Cancer No family hx of      Prostate Cancer No family hx of        SOCIAL HISTORY:  Social History     Socioeconomic History     Marital status:    Occupational History     Occupation: [employer]   Tobacco Use     Smoking status: Former     Packs/day:  0.50     Years: 30.00     Pack years: 15.00     Types: Cigarettes     Quit date: 2017     Years since quittin.3     Smokeless tobacco: Never   Vaping Use     Vaping status: Never Used   Substance and Sexual Activity     Alcohol use: No     Drug use: No     Sexual activity: Not Currently   Social History Narrative    . Remarried.    4 children with first marriage.    3 grand children.    No formal exercise.        CURRENT MEDICATIONS:  Outpatient Medications Prior to Visit   Medication Sig Dispense Refill     ASPIRIN LOW DOSE 81 MG chewable tablet TAKE 1 TABLET BY MOUTH EVERY DAY 90 tablet 3     atorvastatin (LIPITOR) 40 MG tablet Take 1 tablet (40 mg) by mouth daily 90 tablet 3     bumetanide (BUMEX) 0.5 MG tablet Take 1 tablet (0.5 mg) by mouth daily 90 tablet 3     cetirizine (ZYRTEC) 10 MG tablet TAKE 1 TABLET (10 MG) BY MOUTH DAILY. 90 tablet 1     co-enzyme Q-10 100 MG CAPS capsule Take by mouth daily       FARXIGA 10 MG TABS tablet TAKE 1 TABLET (10 MG) BY MOUTH DAILY. 90 tablet 1     metFORMIN (GLUCOPHAGE XR) 500 MG 24 hr tablet TAKE 2 TABLETS (1,000 MG) BY MOUTH DAILY (WITH DINNER) 180 tablet 3     metoprolol succinate ER (TOPROL XL) 200 MG 24 hr tablet TAKE 1 TABLET BY MOUTH EVERY DAY 90 tablet 1     ONETOUCH ULTRA test strip USE TO TEST BLOOD SUGAR 3 TIMES DAILY OR AS DIRECTED. 100 strip 3     sitagliptin (JANUVIA) 100 MG tablet Take 1 tablet (100 mg) by mouth daily 90 tablet 3     sacubitril-valsartan (ENTRESTO) 49-51 MG per tablet Take 1 tablet by mouth 2 times daily 180 tablet 1     No facility-administered medications prior to visit.       ROS:   CONSTITUTIONAL: Denies fever, chills, fatigue, or weight fluctuations.   HEENT: Denies headache, vision changes, and changes in speech.   CV: Refer to HPI.   PULMONARY:Denies shortness of breath, cough, or previous TB exposure.   GI:Denies nausea, vomiting, diarrhea, and abdominal pain. Bowel movements are regular.   :Denies urinary  alterations, dysuria, urinary frequency, hematuria, and abnormal drainage.   EXT:Denies lower extremity edema.   SKIN:Denies abnormal rashes or lesions.   MUSCULOSKELETAL:Denies upper or lower extremity weakness and pain.   NEUROLOGIC:Denies lightheadedness, dizziness, seizures, or upper or lower extremity paresthesia.     EXAM:  /82   Pulse 69   Wt 61.4 kg (135 lb 6.4 oz)   SpO2 97%   BMI 25.38 kg/m    GENERAL: Appears alert and oriented times three.   HEENT: Eye symmetrical and free of discharge bilaterally. Mucous membranes moist and without lesions.  NECK: Supple and without lymphadenopathy. JVD below clavicular line upright.   CV: RRR, S1S2 present without murmur, rub, or gallop.   RESPIRATORY: Respirations regular, even, and unlabored. Lungs CTA throughout.   GI: Soft and non distended with normoactive bowel sounds present in all quadrants. No tenderness, rebound, guarding. No organomegaly.   EXTREMITIES: Trace bilateral LE peripheral edema. 2+ bilateral pedal pulses.   NEUROLOGIC: Alert and orientated x 3. CN II-XII grossly intact. No focal deficits.   MUSCULOSKELETAL: No joint swelling or tenderness.   SKIN: No jaundice. No rashes or lesions.     The following Labs were reviewed today:  CBC RESULTS:  Lab Results   Component Value Date    WBC 9.6 10/10/2022    WBC 8.8 05/19/2021    RBC 5.59 10/10/2022    RBC 5.23 05/19/2021    HGB 17.4 10/10/2022    HGB 15.5 05/19/2021    HCT 53.7 (H) 10/10/2022    HCT 48.5 05/19/2021    MCV 96 10/10/2022    MCV 93 05/19/2021    MCH 31.1 10/10/2022    MCH 29.6 05/19/2021    MCHC 32.4 10/10/2022    MCHC 32.0 05/19/2021    RDW 13.7 10/10/2022    RDW 15.3 (H) 05/19/2021     10/10/2022     05/19/2021       CMP RESULTS:  Lab Results   Component Value Date     05/24/2023     05/19/2021    POTASSIUM 5.0 05/24/2023    POTASSIUM 5.1 10/10/2022    POTASSIUM 4.9 05/19/2021    CHLORIDE 103 05/24/2023    CHLORIDE 108 10/10/2022    CHLORIDE 106  05/19/2021    CO2 22 05/24/2023    CO2 29 10/10/2022    CO2 33 (H) 05/19/2021    ANIONGAP 12 05/24/2023    ANIONGAP 3 10/10/2022    ANIONGAP <1 (L) 05/19/2021     (H) 05/24/2023     (H) 10/10/2022     (H) 05/19/2021    BUN 25.2 (H) 05/24/2023    BUN 23 10/10/2022    BUN 24 05/19/2021    CR 1.29 (H) 05/24/2023    CR 1.18 05/19/2021    GFRESTIMATED 65 05/24/2023    GFRESTIMATED 70 05/19/2021    GFRESTBLACK 81 05/19/2021    ROB 8.9 05/24/2023    ROB 8.9 05/19/2021    BILITOTAL 0.9 11/17/2022    BILITOTAL 2.0 (H) 05/19/2021    ALBUMIN 4.3 11/17/2022    ALBUMIN 4.1 10/10/2022    ALBUMIN 3.6 05/19/2021    ALKPHOS 54 11/17/2022    ALKPHOS 75 05/19/2021    ALT 21 11/17/2022    ALT 35 05/19/2021    AST 33 11/17/2022    AST 28 05/19/2021        INR RESULTS:  Lab Results   Component Value Date    INR 2.3 (A) 12/26/2017    INR 2.36 (H) 11/03/2017       Lab Results   Component Value Date    MAG 2.4 (H) 06/23/2017     Lab Results   Component Value Date    NTBNPI 9,984 (H) 04/08/2017     Lab Results   Component Value Date    NTBNP 1,032 (H) 01/19/2021     Diagnostics reviewed today:   Echo 5/24/23:   Interpretation Summary  Left ventricular function is decreased. The ejection fraction is 10-20%  (severely reduced).  Post MitraClip placement in 2017. Mild mitral insufficiency is present.  Global right ventricular function is mildly to moderately reduced.  Moderate tricuspid insufficiency is present.  Moderate pulmonary hypertension is present.  IVC diameter <2.1 cm collapsing >50% with sniff suggests a normal RA pressure  of 3 mmHg.  No pericardial effusion is present.  No significant changes noted.    Echo 1/19/21:   Interpretation Summary  Mild left ventricular dilation is present. Severely (EF 15-20%) reduced left  ventricular function is present. Diffuse severe hypokinesis present. Basal and  mid Inferior and inferolateral akinesis present     Global right ventricular function is mildly to moderately  reduced.     Post MitraClip placement in 2017. Mild mitral insufficiency is present. Mean  gradient 2.5 mmHg at 69 bpm.     Pulmonary hypertension is present. Estimated pulmonary artery systolic  pressure is 48 mmHg.     The inferior vena cava was normal in size with preserved respiratory  variability.     No pericardial effusion is present.     Assessment and Plan:   Mr. 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 He presents to [facility] for routine follow up stable with persistently reduced EF. However, he continues to remain without significant symptoms at this time.      Chronic systolic heart failure secondary to ICM.    Stage C, NYHA Class II  ACEi/ARB Entresto 49/51 mg po BID, intolerant to prior increase  BB Increase Toprol  mg po daily   Aldosterone antagonist contraindicated due to transient renal function in the past with PEDRO  SCD prophylaxis ICD.   Fluid status Euvolemic. Bumex 0.5 mg po daily.   SGLT2I: Farxiga 10 mg po daily   - Given persistently low EF recommend referral to advanced heart failure. No intervention indicated at this time given he clinically feels well, but high risk for need in the future.      CAD. S/p POBA RCA, FEMI to RCA times 7, LCx, and LAD 3/27/17 with refractory cardiogenic shock s/p IABP transitioned to subclavian balloon pump inpatient.   - ASA, Statin, BB, and Plavix.       Left IJ DVT. Provoked event, present on US 4/29/17, but nonocclusive. Initial diagnosis 4/23/17. Completed 3 months of Coumadin.   - Continue ASA.      CVA. Bilateral hemispheric infarcts secondary to watershed ischemia. Per Neurology no indication for long term anticoagulation from their perspective.   -  Continue ASA and Lipitor.      MR s/p Mitral clip. Implanted 5/1/17. Secondary to Ischemic MR. Echo 1/21 consistent with mild mitral insufficiency is present. Mean gradient 2.5 mmHg at 69 bpm.     DM Type II, controlled. Hgb A1C 6.4 1/10/23.  - Management per PCP.   - Farxiga 10 mg po daily initiated.       Follow up Dr. Collier in 3 months.       Shyanne Montalvo, APRN CNP  5/24/2023          CC

## 2023-05-24 NOTE — NURSING NOTE
03/07/2019  Bonnie Vazquez is a 63 y.o., female.    Anesthesia Evaluation    I have reviewed the Patient Summary Reports.    I have reviewed the Nursing Notes.   I have reviewed the Medications.     Review of Systems  Anesthesia Hx:  No problems with previous Anesthesia    Social:  Non-Smoker, Social Alcohol Use    Hematology/Oncology:  Hematology Normal   Oncology Normal     EENT/Dental:EENT/Dental Normal   Cardiovascular:  Cardiovascular Normal Exercise tolerance: good     Pulmonary:  Pulmonary Normal    Renal/:   renal calculi    Hepatic/GI:  Hepatic/GI Normal Bowel Prep.    Musculoskeletal:   Arthritis     Neurological:  Neurology Normal    Endocrine:  Endocrine Normal    Dermatological:  Skin Normal    Psych:  Psychiatric Normal           Physical Exam  General:  Well nourished    Airway/Jaw/Neck:  Airway Findings: Mouth Opening: Normal Tongue: Normal  General Airway Assessment: Adult  Mallampati: II  TM Distance: Normal, at least 6 cm  Jaw/Neck Findings:  Neck ROM: Normal ROM      Dental:  Dental Findings: In tact   Chest/Lungs:  Chest/Lungs Findings: Clear to auscultation, Normal Respiratory Rate     Heart/Vascular:  Heart Findings: Rate: Normal  Rhythm: Regular Rhythm  Sounds: Normal        Mental Status:  Mental Status Findings:  Cooperative, Alert and Oriented         Anesthesia Plan  Type of Anesthesia, risks & benefits discussed:  Anesthesia Type:  MAC  Patient's Preference:   Intra-op Monitoring Plan: standard ASA monitors  Intra-op Monitoring Plan Comments:   Post Op Pain Control Plan:   Post Op Pain Control Plan Comments:   Induction:   IV  Beta Blocker:  Patient is not currently on a Beta-Blocker (No further documentation required).       Informed Consent: Patient understands risks and agrees with Anesthesia plan.  Questions answered. Anesthesia consent signed with patient.  ASA  -- DO NOT REPLY / DO NOT REPLY ALL --  -- Message is from the [facility]--    COVID-19 Universal Screening: N/A - Not about scheduling    General Patient Message      Reason for Call: Patient is requesting a call back to discuss lab work that may need to be done before an appointment. Patient declined to schedule an appointment at this time during the call.     Caller Information       Type Contact Phone    08/28/2020 01:45 PM CDT Phone (Incoming) Margo Horn (Self) [phone] (H)          Alternative phone number: none    Turnaround time given to caller:   \"This message will be sent to [state Provider's name]. The clinical team will fulfill your request as soon as they review your message.\"     Chief Complaint   Patient presents with     Follow Up     Return [facility]     Vitals were taken and medications reconciled.    Kofi Lipscomb, EMT  9:06 AM   Score: 2     Day of Surgery Review of History & Physical: I have interviewed and examined the patient. I have reviewed the patient's H&P dated:  There are no significant changes.          Ready For Surgery From Anesthesia Perspective.

## 2023-05-24 NOTE — LETTER
5/24/2023      RE: Luke Henao  [address]  [facility] [id number]       Dear Colleague,    Thank you for the opportunity to participate in the care of your patient, Luke Henao, at the [facility] [place] [facility]. Please see a copy of my visit note below.    HPI:   Mr. 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 He presents to [facility] for routine follow up.     Repeat echocardiogram notes unchanged reduced EF at 10-20%. He continues to feel well with mild LE edema at the end of his 9-10 hour work day that resolves with an additional Bumex. He denies fever, chills, lightheadedness, dizziness, chest pain, palpitations, SOB, RAYMOND, PND, orthopnea, nausea, vomiting, and diarrhea. His weight remains stable at 135 lbs. He continues to follow a low sodium diet.      PAST MEDICAL HISTORY:  Past Medical History:   Diagnosis Date    Benign essential hypertension     CAD (coronary artery disease) 2017    RCA STEMI s/p balloon angioplasty and multiple Sofie to RCA, LCx, and LAD    Chronic systolic congestive heart failure (H)     History of deep venous thrombosis 2017    left internal jugular (line-associated); left common femoral; was on coumadin    History of mitral valve repair 2017    History of stroke 2017    no residual deficits    ICD (implantable cardioverter-defibrillator) in place     Type 2 diabetes mellitus (H)        FAMILY HISTORY:  Family History   Problem Relation Age of Onset    Hypertension Mother     Diabetes Type 2  Father     Hypertension Father     Myocardial Infarction No family hx of     Cerebrovascular Disease No family hx of     Coronary  Artery Disease Early Onset No family hx of     Colon Cancer No family hx of     Prostate Cancer No family hx of        SOCIAL HISTORY:  Social History     Socioeconomic History    Marital status:    Occupational History    Occupation: [employer]   Tobacco Use    Smoking status: Former     Packs/day: 0.50     Years: 30.00     Pack years: 15.00     Types: Cigarettes     Quit date: 2017     Years since quittin.3    Smokeless tobacco: Never   Vaping Use    Vaping status: Never Used   Substance and Sexual Activity    Alcohol use: No    Drug use: No    Sexual activity: Not Currently   Social History Narrative    . Remarried.    4 children with first marriage.    3 grand children.    No formal exercise.        CURRENT MEDICATIONS:  Outpatient Medications Prior to Visit   Medication Sig Dispense Refill    ASPIRIN LOW DOSE 81 MG chewable tablet TAKE 1 TABLET BY MOUTH EVERY DAY 90 tablet 3    atorvastatin (LIPITOR) 40 MG tablet Take 1 tablet (40 mg) by mouth daily 90 tablet 3    bumetanide (BUMEX) 0.5 MG tablet Take 1 tablet (0.5 mg) by mouth daily 90 tablet 3    cetirizine (ZYRTEC) 10 MG tablet TAKE 1 TABLET (10 MG) BY MOUTH DAILY. 90 tablet 1    co-enzyme Q-10 100 MG CAPS capsule Take by mouth daily      FARXIGA 10 MG TABS tablet TAKE 1 TABLET (10 MG) BY MOUTH DAILY. 90 tablet 1    metFORMIN (GLUCOPHAGE XR) 500 MG 24 hr tablet TAKE 2 TABLETS (1,000 MG) BY MOUTH DAILY (WITH DINNER) 180 tablet 3    metoprolol succinate ER (TOPROL XL) 200 MG 24 hr tablet TAKE 1 TABLET BY MOUTH EVERY DAY 90 tablet 1    ONETOUCH ULTRA test strip USE TO TEST BLOOD SUGAR 3 TIMES DAILY OR AS DIRECTED. 100 strip 3    sitagliptin (JANUVIA) 100 MG tablet Take 1 tablet (100 mg) by mouth daily 90 tablet 3    sacubitril-valsartan (ENTRESTO) 49-51 MG per tablet Take 1 tablet by mouth 2 times daily 180 tablet 1     No facility-administered medications prior to visit.       ROS:   CONSTITUTIONAL: Denies fever, chills,  fatigue, or weight fluctuations.   HEENT: Denies headache, vision changes, and changes in speech.   CV: Refer to HPI.   PULMONARY:Denies shortness of breath, cough, or previous TB exposure.   GI:Denies nausea, vomiting, diarrhea, and abdominal pain. Bowel movements are regular.   :Denies urinary alterations, dysuria, urinary frequency, hematuria, and abnormal drainage.   EXT:Denies lower extremity edema.   SKIN:Denies abnormal rashes or lesions.   MUSCULOSKELETAL:Denies upper or lower extremity weakness and pain.   NEUROLOGIC:Denies lightheadedness, dizziness, seizures, or upper or lower extremity paresthesia.     EXAM:  /82   Pulse 69   Wt 61.4 kg (135 lb 6.4 oz)   SpO2 97%   BMI 25.38 kg/m    GENERAL: Appears alert and oriented times three.   HEENT: Eye symmetrical and free of discharge bilaterally. Mucous membranes moist and without lesions.  NECK: Supple and without lymphadenopathy. JVD below clavicular line upright.   CV: RRR, S1S2 present without murmur, rub, or gallop.   RESPIRATORY: Respirations regular, even, and unlabored. Lungs CTA throughout.   GI: Soft and non distended with normoactive bowel sounds present in all quadrants. No tenderness, rebound, guarding. No organomegaly.   EXTREMITIES: Trace bilateral LE peripheral edema. 2+ bilateral pedal pulses.   NEUROLOGIC: Alert and orientated x 3. CN II-XII grossly intact. No focal deficits.   MUSCULOSKELETAL: No joint swelling or tenderness.   SKIN: No jaundice. No rashes or lesions.     The following Labs were reviewed today:  CBC RESULTS:  Lab Results   Component Value Date    WBC 9.6 10/10/2022    WBC 8.8 05/19/2021    RBC 5.59 10/10/2022    RBC 5.23 05/19/2021    HGB 17.4 10/10/2022    HGB 15.5 05/19/2021    HCT 53.7 (H) 10/10/2022    HCT 48.5 05/19/2021    MCV 96 10/10/2022    MCV 93 05/19/2021    MCH 31.1 10/10/2022    MCH 29.6 05/19/2021    MCHC 32.4 10/10/2022    MCHC 32.0 05/19/2021    RDW 13.7 10/10/2022    RDW 15.3 (H) 05/19/2021      10/10/2022     05/19/2021       CMP RESULTS:  Lab Results   Component Value Date     05/24/2023     05/19/2021    POTASSIUM 5.0 05/24/2023    POTASSIUM 5.1 10/10/2022    POTASSIUM 4.9 05/19/2021    CHLORIDE 103 05/24/2023    CHLORIDE 108 10/10/2022    CHLORIDE 106 05/19/2021    CO2 22 05/24/2023    CO2 29 10/10/2022    CO2 33 (H) 05/19/2021    ANIONGAP 12 05/24/2023    ANIONGAP 3 10/10/2022    ANIONGAP <1 (L) 05/19/2021     (H) 05/24/2023     (H) 10/10/2022     (H) 05/19/2021    BUN 25.2 (H) 05/24/2023    BUN 23 10/10/2022    BUN 24 05/19/2021    CR 1.29 (H) 05/24/2023    CR 1.18 05/19/2021    GFRESTIMATED 65 05/24/2023    GFRESTIMATED 70 05/19/2021    GFRESTBLACK 81 05/19/2021    ROB 8.9 05/24/2023    ROB 8.9 05/19/2021    BILITOTAL 0.9 11/17/2022    BILITOTAL 2.0 (H) 05/19/2021    ALBUMIN 4.3 11/17/2022    ALBUMIN 4.1 10/10/2022    ALBUMIN 3.6 05/19/2021    ALKPHOS 54 11/17/2022    ALKPHOS 75 05/19/2021    ALT 21 11/17/2022    ALT 35 05/19/2021    AST 33 11/17/2022    AST 28 05/19/2021        INR RESULTS:  Lab Results   Component Value Date    INR 2.3 (A) 12/26/2017    INR 2.36 (H) 11/03/2017       Lab Results   Component Value Date    MAG 2.4 (H) 06/23/2017     Lab Results   Component Value Date    NTBNPI 9,984 (H) 04/08/2017     Lab Results   Component Value Date    NTBNP 1,032 (H) 01/19/2021     Diagnostics reviewed today:   Echo 5/24/23:   Interpretation Summary  Left ventricular function is decreased. The ejection fraction is 10-20%  (severely reduced).  Post MitraClip placement in 2017. Mild mitral insufficiency is present.  Global right ventricular function is mildly to moderately reduced.  Moderate tricuspid insufficiency is present.  Moderate pulmonary hypertension is present.  IVC diameter <2.1 cm collapsing >50% with sniff suggests a normal RA pressure  of 3 mmHg.  No pericardial effusion is present.  No significant changes noted.    Echo 1/19/21:    Interpretation Summary  Mild left ventricular dilation is present. Severely (EF 15-20%) reduced left  ventricular function is present. Diffuse severe hypokinesis present. Basal and  mid Inferior and inferolateral akinesis present     Global right ventricular function is mildly to moderately reduced.     Post MitraClip placement in 2017. Mild mitral insufficiency is present. Mean  gradient 2.5 mmHg at 69 bpm.     Pulmonary hypertension is present. Estimated pulmonary artery systolic  pressure is 48 mmHg.     The inferior vena cava was normal in size with preserved respiratory  variability.     No pericardial effusion is present.     Assessment and Plan:   Mr. 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 He presents to [facility] for routine follow up stable with persistently reduced EF. However, he continues to remain without significant symptoms at this time.      Chronic systolic heart failure secondary to ICM.    Stage C, NYHA Class II  ACEi/ARB Entresto 49/51 mg po BID, intolerant to prior increase  BB Increase Toprol  mg po daily   Aldosterone antagonist contraindicated due to transient renal function in the past with PEDRO  SCD prophylaxis ICD.   Fluid status Euvolemic. Bumex 0.5 mg po daily.   SGLT2I: Farxiga 10 mg po daily   - Given persistently low EF recommend referral to advanced heart failure. No intervention indicated at this time given he clinically feels well, but high risk for need in the future.      CAD. S/p POBA RCA, FEMI to RCA times 7, LCx, and LAD 3/27/17 with refractory cardiogenic shock s/p IABP transitioned to subclavian balloon pump inpatient.   - ASA, Statin, BB, and Plavix.       Left IJ DVT.  Provoked event, present on US 4/29/17, but nonocclusive. Initial diagnosis 4/23/17. Completed 3 months of Coumadin.   - Continue ASA.      CVA. Bilateral hemispheric infarcts secondary to watershed ischemia. Per Neurology no indication for long term anticoagulation from their perspective.   - Continue ASA and Lipitor.      MR s/p Mitral clip. Implanted 5/1/17. Secondary to Ischemic MR. Echo 1/21 consistent with mild mitral insufficiency is present. Mean gradient 2.5 mmHg at 69 bpm.     DM Type II, controlled. Hgb A1C 6.4 1/10/23.  - Management per PCP.   - Farxiga 10 mg po daily initiated.       Follow up Dr. Collier in 3 months.       Shyanne Montlavo, APRN CNP  5/24/2023

## 2023-05-24 NOTE — PROGRESS NOTES
New Heart Failure Referral     Situation:     Referred by Shyanne MOCTEZUMA. Needs to establish care with HF MD    Background     Mr. 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 Recent EF estimated by echo 10-20% read 5/24. receommendation to [facility] with Dr. Collier and appointment was made for August.     Assessment/Recommendation:     Sending to Heart Failure RN to Review.    Plan:     According to note patient was to follow up with Dr. Nando simms 3 months. Appointment was already made in Aug with Dr. Collier closing referral

## 2023-06-12 DIAGNOSIS — I50.22 CHRONIC SYSTOLIC CONGESTIVE HEART FAILURE (H): ICD-10-CM

## 2023-06-12 DIAGNOSIS — E11.59 TYPE 2 DIABETES MELLITUS WITH OTHER CIRCULATORY COMPLICATION, WITHOUT LONG-TERM CURRENT USE OF INSULIN (H): ICD-10-CM

## 2023-06-15 RX ORDER — DAPAGLIFLOZIN 10 MG/1
TABLET, FILM COATED ORAL
Qty: 90 TABLET | Refills: 0 | Status: SHIPPED | OUTPATIENT
Start: 2023-06-15 | End: 2023-09-07

## 2023-06-15 NOTE — TELEPHONE ENCOUNTER
Provider no longer with this clinic. Ok to fill under [facility] until establishes with Dr Collier. Shree approved. Soraya Messina RN

## 2023-07-02 NOTE — PLAN OF CARE
Problem: Goal Outcome Summary  Goal: Goal Outcome Summary  T-max 100.5, tachycardic to the 130s. Cards 2 notified. Urine and blood cultures pending; vanco/zosyn started. Blood noted in IABP tubing at 0445. Cards notified. Transducer clotted off. Right radial a-line placed for BP monitoring. CVP 11/11/14. SvO2 44-50s. Team aware.     Plan for possible subclavian IABP placement today.       Labs/Imaging Studies/Medications
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
## 2023-07-22 NOTE — PROGRESS NOTES
Calorie Counts     Intake Recorded For: 4/17   Kcals: 0  Protein: 0g     # Meals Recorded: 1 meal ordered from kitchen, no food intake recorded.      # Supplements Recorded: 0   Orthopedic

## 2023-08-18 DIAGNOSIS — Z91.09 ENVIRONMENTAL ALLERGIES: ICD-10-CM

## 2023-08-18 RX ORDER — CETIRIZINE HYDROCHLORIDE 10 MG/1
TABLET ORAL
Qty: 90 TABLET | Refills: 1 | Status: SHIPPED | OUTPATIENT
Start: 2023-08-18 | End: 2024-02-20

## 2023-08-28 ENCOUNTER — CARE COORDINATION (OUTPATIENT)
Dept: CARDIOLOGY | Facility: CLINIC | Age: 58
End: 2023-08-28
Payer: COMMERCIAL

## 2023-08-28 DIAGNOSIS — I50.22 CHRONIC SYSTOLIC CONGESTIVE HEART FAILURE (H): Primary | ICD-10-CM

## 2023-08-29 ENCOUNTER — OFFICE VISIT (OUTPATIENT)
Dept: CARDIOLOGY | Facility: CLINIC | Age: 58
End: 2023-08-29
Attending: INTERNAL MEDICINE
Payer: COMMERCIAL

## 2023-08-29 ENCOUNTER — LAB (OUTPATIENT)
Dept: LAB | Facility: CLINIC | Age: 58
End: 2023-08-29
Attending: NURSE PRACTITIONER
Payer: COMMERCIAL

## 2023-08-29 VITALS
DIASTOLIC BLOOD PRESSURE: 97 MMHG | BODY MASS INDEX: 26.24 KG/M2 | HEIGHT: 61 IN | WEIGHT: 139 LBS | OXYGEN SATURATION: 96 % | SYSTOLIC BLOOD PRESSURE: 158 MMHG | HEART RATE: 72 BPM

## 2023-08-29 DIAGNOSIS — I42.9 CARDIOMYOPATHY (H): ICD-10-CM

## 2023-08-29 DIAGNOSIS — Z95.810 ICD (IMPLANTABLE CARDIOVERTER-DEFIBRILLATOR) IN PLACE: ICD-10-CM

## 2023-08-29 DIAGNOSIS — I10 BENIGN ESSENTIAL HYPERTENSION: ICD-10-CM

## 2023-08-29 DIAGNOSIS — I25.10 CORONARY ARTERY DISEASE INVOLVING NATIVE CORONARY ARTERY OF NATIVE HEART WITHOUT ANGINA PECTORIS: ICD-10-CM

## 2023-08-29 DIAGNOSIS — Z98.890 HISTORY OF MITRAL VALVE REPAIR: ICD-10-CM

## 2023-08-29 DIAGNOSIS — I50.22 CHRONIC SYSTOLIC CONGESTIVE HEART FAILURE (H): Primary | ICD-10-CM

## 2023-08-29 DIAGNOSIS — I25.5 ISCHEMIC CARDIOMYOPATHY: ICD-10-CM

## 2023-08-29 DIAGNOSIS — I50.22 CHRONIC SYSTOLIC CONGESTIVE HEART FAILURE (H): ICD-10-CM

## 2023-08-29 DIAGNOSIS — E78.2 MIXED HYPERLIPIDEMIA: ICD-10-CM

## 2023-08-29 LAB
ALBUMIN SERPL BCG-MCNC: 4.3 G/DL (ref 3.5–5.2)
ALP SERPL-CCNC: 68 U/L (ref 40–129)
ALT SERPL W P-5'-P-CCNC: 23 U/L (ref 0–70)
ANION GAP SERPL CALCULATED.3IONS-SCNC: 12 MMOL/L (ref 7–15)
AST SERPL W P-5'-P-CCNC: 26 U/L (ref 0–45)
BILIRUB SERPL-MCNC: 1.5 MG/DL
BUN SERPL-MCNC: 18.8 MG/DL (ref 6–20)
CALCIUM SERPL-MCNC: 8.8 MG/DL (ref 8.6–10)
CHLORIDE SERPL-SCNC: 104 MMOL/L (ref 98–107)
CREAT SERPL-MCNC: 1.08 MG/DL (ref 0.67–1.17)
DEPRECATED HCO3 PLAS-SCNC: 23 MMOL/L (ref 22–29)
ERYTHROCYTE [DISTWIDTH] IN BLOOD BY AUTOMATED COUNT: 13.6 % (ref 10–15)
GFR SERPL CREATININE-BSD FRML MDRD: 81 ML/MIN/1.73M2
GLUCOSE SERPL-MCNC: 169 MG/DL (ref 70–99)
HCT VFR BLD AUTO: 49.2 % (ref 40–53)
HGB BLD-MCNC: 16.2 G/DL (ref 13.3–17.7)
MCH RBC QN AUTO: 31.1 PG (ref 26.5–33)
MCHC RBC AUTO-ENTMCNC: 32.9 G/DL (ref 31.5–36.5)
MCV RBC AUTO: 94 FL (ref 78–100)
MDC_IDC_LEAD_IMPLANT_DT: NORMAL
MDC_IDC_LEAD_LOCATION: NORMAL
MDC_IDC_LEAD_LOCATION_DETAIL_1: NORMAL
MDC_IDC_LEAD_MFG: NORMAL
MDC_IDC_LEAD_MODEL: NORMAL
MDC_IDC_LEAD_POLARITY_TYPE: NORMAL
MDC_IDC_LEAD_SERIAL: NORMAL
MDC_IDC_LEAD_SPECIAL_FUNCTION: NORMAL
MDC_IDC_MSMT_BATTERY_DTM: NORMAL
MDC_IDC_MSMT_BATTERY_REMAINING_LONGEVITY: 78 MO
MDC_IDC_MSMT_BATTERY_RRT_TRIGGER: 2.73
MDC_IDC_MSMT_BATTERY_STATUS: NORMAL
MDC_IDC_MSMT_BATTERY_VOLTAGE: 2.99 V
MDC_IDC_MSMT_CAP_CHARGE_DTM: NORMAL
MDC_IDC_MSMT_CAP_CHARGE_ENERGY: 18 J
MDC_IDC_MSMT_CAP_CHARGE_TIME: 3.74
MDC_IDC_MSMT_CAP_CHARGE_TYPE: NORMAL
MDC_IDC_MSMT_LEADCHNL_RV_IMPEDANCE_VALUE: 304 OHM
MDC_IDC_MSMT_LEADCHNL_RV_IMPEDANCE_VALUE: 342 OHM
MDC_IDC_MSMT_LEADCHNL_RV_PACING_THRESHOLD_AMPLITUDE: 0.5 V
MDC_IDC_MSMT_LEADCHNL_RV_PACING_THRESHOLD_PULSEWIDTH: 0.4 MS
MDC_IDC_MSMT_LEADCHNL_RV_SENSING_INTR_AMPL: 8.38 MV
MDC_IDC_PG_IMPLANT_DTM: NORMAL
MDC_IDC_PG_MFG: NORMAL
MDC_IDC_PG_MODEL: NORMAL
MDC_IDC_PG_SERIAL: NORMAL
MDC_IDC_PG_TYPE: NORMAL
MDC_IDC_SESS_CLINIC_NAME: NORMAL
MDC_IDC_SESS_DTM: NORMAL
MDC_IDC_SESS_TYPE: NORMAL
MDC_IDC_SET_BRADY_HYSTRATE: NORMAL
MDC_IDC_SET_BRADY_LOWRATE: 40 {BEATS}/MIN
MDC_IDC_SET_BRADY_MODE: NORMAL
MDC_IDC_SET_LEADCHNL_RV_PACING_AMPLITUDE: 2 V
MDC_IDC_SET_LEADCHNL_RV_PACING_ANODE_ELECTRODE_1: NORMAL
MDC_IDC_SET_LEADCHNL_RV_PACING_ANODE_LOCATION_1: NORMAL
MDC_IDC_SET_LEADCHNL_RV_PACING_CAPTURE_MODE: NORMAL
MDC_IDC_SET_LEADCHNL_RV_PACING_CATHODE_ELECTRODE_1: NORMAL
MDC_IDC_SET_LEADCHNL_RV_PACING_CATHODE_LOCATION_1: NORMAL
MDC_IDC_SET_LEADCHNL_RV_PACING_POLARITY: NORMAL
MDC_IDC_SET_LEADCHNL_RV_PACING_PULSEWIDTH: 0.4 MS
MDC_IDC_SET_LEADCHNL_RV_SENSING_ANODE_ELECTRODE_1: NORMAL
MDC_IDC_SET_LEADCHNL_RV_SENSING_ANODE_LOCATION_1: NORMAL
MDC_IDC_SET_LEADCHNL_RV_SENSING_CATHODE_ELECTRODE_1: NORMAL
MDC_IDC_SET_LEADCHNL_RV_SENSING_CATHODE_LOCATION_1: NORMAL
MDC_IDC_SET_LEADCHNL_RV_SENSING_POLARITY: NORMAL
MDC_IDC_SET_LEADCHNL_RV_SENSING_SENSITIVITY: 0.3 MV
MDC_IDC_SET_ZONE_DETECTION_BEATS_DENOMINATOR: 40 {BEATS}
MDC_IDC_SET_ZONE_DETECTION_BEATS_NUMERATOR: 30 {BEATS}
MDC_IDC_SET_ZONE_DETECTION_INTERVAL: 300 MS
MDC_IDC_SET_ZONE_DETECTION_INTERVAL: 360 MS
MDC_IDC_SET_ZONE_DETECTION_INTERVAL: 390 MS
MDC_IDC_SET_ZONE_DETECTION_INTERVAL: NORMAL
MDC_IDC_SET_ZONE_TYPE: NORMAL
MDC_IDC_STAT_AT_BURDEN_PERCENT: 0 %
MDC_IDC_STAT_AT_DTM_END: NORMAL
MDC_IDC_STAT_AT_DTM_START: NORMAL
MDC_IDC_STAT_BRADY_DTM_END: NORMAL
MDC_IDC_STAT_BRADY_DTM_START: NORMAL
MDC_IDC_STAT_BRADY_RV_PERCENT_PACED: 0.02 %
MDC_IDC_STAT_EPISODE_RECENT_COUNT: 0
MDC_IDC_STAT_EPISODE_RECENT_COUNT_DTM_END: NORMAL
MDC_IDC_STAT_EPISODE_RECENT_COUNT_DTM_START: NORMAL
MDC_IDC_STAT_EPISODE_TOTAL_COUNT: 0
MDC_IDC_STAT_EPISODE_TOTAL_COUNT_DTM_END: NORMAL
MDC_IDC_STAT_EPISODE_TOTAL_COUNT_DTM_START: NORMAL
MDC_IDC_STAT_EPISODE_TYPE: NORMAL
MDC_IDC_STAT_TACHYTHERAPY_ATP_DELIVERED_RECENT: 0
MDC_IDC_STAT_TACHYTHERAPY_ATP_DELIVERED_TOTAL: 0
MDC_IDC_STAT_TACHYTHERAPY_RECENT_DTM_END: NORMAL
MDC_IDC_STAT_TACHYTHERAPY_RECENT_DTM_START: NORMAL
MDC_IDC_STAT_TACHYTHERAPY_SHOCKS_ABORTED_RECENT: 0
MDC_IDC_STAT_TACHYTHERAPY_SHOCKS_ABORTED_TOTAL: 0
MDC_IDC_STAT_TACHYTHERAPY_SHOCKS_DELIVERED_RECENT: 0
MDC_IDC_STAT_TACHYTHERAPY_SHOCKS_DELIVERED_TOTAL: 0
MDC_IDC_STAT_TACHYTHERAPY_TOTAL_DTM_END: NORMAL
MDC_IDC_STAT_TACHYTHERAPY_TOTAL_DTM_START: NORMAL
NT-PROBNP SERPL-MCNC: 1533 PG/ML (ref 0–900)
PLATELET # BLD AUTO: 181 10E3/UL (ref 150–450)
POTASSIUM SERPL-SCNC: 4 MMOL/L (ref 3.4–5.3)
PROT SERPL-MCNC: 7.1 G/DL (ref 6.4–8.3)
RBC # BLD AUTO: 5.21 10E6/UL (ref 4.4–5.9)
SODIUM SERPL-SCNC: 139 MMOL/L (ref 136–145)
WBC # BLD AUTO: 10.3 10E3/UL (ref 4–11)

## 2023-08-29 PROCEDURE — 80053 COMPREHEN METABOLIC PANEL: CPT | Performed by: PATHOLOGY

## 2023-08-29 PROCEDURE — 93282 PRGRMG EVAL IMPLANTABLE DFB: CPT | Performed by: INTERNAL MEDICINE

## 2023-08-29 PROCEDURE — G0463 HOSPITAL OUTPT CLINIC VISIT: HCPCS | Performed by: INTERNAL MEDICINE

## 2023-08-29 PROCEDURE — 83880 ASSAY OF NATRIURETIC PEPTIDE: CPT | Performed by: PATHOLOGY

## 2023-08-29 PROCEDURE — 99215 OFFICE O/P EST HI 40 MIN: CPT | Mod: GC | Performed by: INTERNAL MEDICINE

## 2023-08-29 PROCEDURE — 36415 COLL VENOUS BLD VENIPUNCTURE: CPT | Performed by: PATHOLOGY

## 2023-08-29 PROCEDURE — 85027 COMPLETE CBC AUTOMATED: CPT | Performed by: PATHOLOGY

## 2023-08-29 RX ORDER — METOPROLOL SUCCINATE 100 MG/1
100 TABLET, EXTENDED RELEASE ORAL DAILY
Qty: 90 TABLET | Refills: 3 | Status: SHIPPED | OUTPATIENT
Start: 2023-08-29 | End: 2024-01-23 | Stop reason: DRUGHIGH

## 2023-08-29 RX ORDER — BUMETANIDE 0.5 MG/1
0.5 TABLET ORAL DAILY
Qty: 90 TABLET | Refills: 3 | Status: SHIPPED | OUTPATIENT
Start: 2023-08-29 | End: 2024-09-24

## 2023-08-29 RX ORDER — SPIRONOLACTONE 25 MG/1
12.5 TABLET ORAL DAILY
Qty: 45 TABLET | Refills: 3 | Status: SHIPPED | OUTPATIENT
Start: 2023-08-29 | End: 2024-09-24

## 2023-08-29 ASSESSMENT — PAIN SCALES - GENERAL: PAINLEVEL: NO PAIN (0)

## 2023-08-29 NOTE — PATIENT INSTRUCTIONS
Dr. Collier recommends:    Decrease Metoprolol XL to 100 MG once daily.    Take Bumex 0.5 MG every day.    Start taking Spironolactone 12.5 MG once daily.    Follow up clinic visit with Dr. Collier in 3 months with an echocardiogram and labs the same day.    Thank you for your visit today.  Please call me with any questions or concerns.   Glenn Sun RN  Cardiology Care Coordinator  [phone]

## 2023-08-29 NOTE — PATIENT INSTRUCTIONS
It was a pleasure to see you in clinic today, please do not hesitate to call us for any questions or concerns.  Your next automatic remote ICD check from home is scheduled for 11/29/23.    Bertha Hamm RN    Electrophysiology Nurse Clinician  [facility]    During Business Hours Please Call:  [phone]  After Hours Please Call:  [phone] - select option #4 and ask for job code [phone]

## 2023-08-29 NOTE — NURSING NOTE
Chief Complaint   Patient presents with    New Patient     New heart failure        Vitals were taken, medications reconciled.    Yvrose Madrigal CMA  8:43 AM

## 2023-08-29 NOTE — PROGRESS NOTES
"August 29, 2023     I had the pleasure of seeing Luke Henao  in the [facility]. As you know patient has a complex medical history including ICM c/b RCA STEMI (s/p POBA RCA, FEMI RCA x7, Lcx. LAD 3/2017) c/b cardiogenic shock s/p IABP, MR s/p mitral clip, HTN, DVT on AC, bilateral hemispheric strokes. He presents to Advanced [facility] Failure Clinic to establish care.      The patient has been followed in [facility] for a number of years, most recently seen on 5/24/23. He was initially transferred to [facility] on 3/27/2017 with cardiogenic shock after an inferior STEMI. Patient had received a POBA to his RCA at that time, as he was considered for CABG. Initial TTE with LVEF of 30-35%. Underwent coronary angiogram with a FEMI to his RCA, LAD, and Lcx. Further hospital course was complicated by cardiogenic shock c/b IABP. He underwent a Mitraclip for ischemic MR. Underwent ICD implant on 10/2017. His GDMT was titrated over the course of the following years. He had improvement in symptoms, though his LVEF continued to be reduced. Most recently 10-20%.     Today, the patient presents alone and presentation was assisted by . The patient relates that he has been feeling more fatigued consistently in the last month. Some days are better than others. He has intermittent leg swelling that improves when he takes bumex. He does not take bumex every day. He has RAYMOND and when he has leg swelling he can only walk short distances and is unable to do a flight of stairs. He initially denied any chest pain or pressure, but then later said it occurs \"sometimes\" though stated he moreso has SOB and fatigue. No abd swelling. He currently does not have orthopnea though has had this in the last month. Denies bendopnea. He has lightheadedness and dizziness. He takes medications consistently though has run out of entresto. He has tried to take the  mg enstresto at night though feels he became very fatigued and " hypotensive. He does not smoke, drink, or use any drugs. He has controlled DM and his last A1c was 6.4%. He is A+. Dry weight sounds to be at most 135 lbs. (Currently 139lbs). No ICD shocks.      PAST MEDICAL HISTORY:  Past Medical History:   Diagnosis Date    Benign essential hypertension     CAD (coronary artery disease)     RCA STEMI s/p balloon angioplasty and multiple Sofie to RCA, LCx, and LAD    Chronic systolic congestive heart failure (H)     History of deep venous thrombosis 2017    left internal jugular (line-associated); left common femoral; was on coumadin    History of mitral valve repair 2017    History of stroke 2017    no residual deficits    ICD (implantable cardioverter-defibrillator) in place     Type 2 diabetes mellitus (H)      FAMILY HISTORY:  Family History   Problem Relation Age of Onset    Hypertension Mother     Diabetes Type 2  Father     Hypertension Father     Myocardial Infarction No family hx of     Cerebrovascular Disease No family hx of     Coronary Artery Disease Early Onset No family hx of     Colon Cancer No family hx of     Prostate Cancer No family hx of      SOCIAL HISTORY:  Social History     Socioeconomic History    Marital status:    Occupational History    Occupation: [employer]   Tobacco Use    Smoking status: Former     Packs/day: 0.50     Years: 30.00     Pack years: 15.00     Types: Cigarettes     Quit date: 2017     Years since quittin.6    Smokeless tobacco: Never   Vaping Use    Vaping Use: Never used   Substance and Sexual Activity    Alcohol use: No    Drug use: No    Sexual activity: Not Currently   Social History Narrative    . Remarried.    4 children with first marriage.    3 grand children.    No formal exercise.      CURRENT MEDICATIONS:  Current Outpatient Medications   Medication    ASPIRIN LOW DOSE 81 MG chewable tablet    atorvastatin (LIPITOR) 40 MG tablet    bumetanide (BUMEX) 0.5 MG tablet    cetirizine (ZYRTEC) 10  "MG tablet    co-enzyme Q-10 100 MG CAPS capsule    FARXIGA 10 MG TABS tablet    metFORMIN (GLUCOPHAGE XR) 500 MG 24 hr tablet    metoprolol succinate ER (TOPROL XL) 200 MG 24 hr tablet    ONETOUCH ULTRA test strip    sitagliptin (JANUVIA) 100 MG tablet    sacubitril-valsartan (ENTRESTO) 49-51 MG per tablet     No current facility-administered medications for this visit.     ROS:   10-point ROS negative except per HPI above.      EXAM:  BP (!) 158/97 (BP Location: Left arm, Patient Position: Sitting, Cuff Size: Adult Regular)   Pulse 72   Ht 1.56 m (5' 1.42\")   Wt 63 kg (139 lb)   SpO2 96%   BMI 25.91 kg/m    General: appears comfortable, alert and oriented  Head: normocephalic, atraumatic  Eyes: anicteric sclera, EOMI , PERRL  Neck: no adenopathy  Orophyarynx: moist mucosa, no lesions noted  Heart: regular, S1/S2, no murmurs, rubs or gallop. JVD difficult to appreciate  Lungs: CTAB, No wheezing.   Abdomen: soft, non-tender, slightly distended, bowel sounds present, no hepatosplenomegaly  Extremities: Trace BLE edema  Skin: no open lesions noted  Neuro: grossly non-focal     Labs:  Lab Results   Component Value Date    WBC 10.3 08/29/2023    HGB 16.2 08/29/2023    HCT 49.2 08/29/2023     08/29/2023     08/29/2023    POTASSIUM 4.0 08/29/2023    CHLORIDE 104 08/29/2023    CO2 23 08/29/2023    BUN 18.8 08/29/2023    CR 1.08 08/29/2023     (H) 08/29/2023    SED 40 (H) 06/19/2017    NTBNPI 9,984 (H) 04/08/2017    NTBNP 1,533 (H) 08/29/2023    TROPI 0.267 (HH) 04/26/2017    AST 26 08/29/2023    ALT 23 08/29/2023    ALKPHOS 68 08/29/2023    BILITOTAL 1.5 (H) 08/29/2023    INR 2.3 (A) 12/26/2017     TTE 5/2023:  Left ventricular function is decreased. The ejection fraction is 10-20%  (severely reduced).  Post MitraClip placement in 2017. Mild mitral insufficiency is present.  Global right ventricular function is mildly to moderately reduced.  Moderate tricuspid insufficiency is present.  Moderate " pulmonary hypertension is present.  IVC diameter <2.1 cm collapsing >50% with sniff suggests a normal RA pressure  of 3 mmHg.  No pericardial effusion is present.  No significant changes noted.    Device Check 5/24/23:  Device: Medtronic [id number] Visia AF MRI VR  Normal device function.  Mode: VVI 40 bpm  : <0.1%  Intrinsic rhythm: VS 68 bpm  Thoracic Impedance:  Near reference line.   Short V-V intervals: 0  Lead Trends Appear Stable.  Estimated battery longevity to RRT = 6.9 years. Battery voltage = 2.99V.   Atrial Arrhythmia: None  AF [place]: 0%  Anticoagulant: None  Ventricular Arrhythmia: None  Setting Changes: Last shock vector changed to B>AX from AX>B  Patient has an appointment to see Shyanne VENEGAS today.   Plan: Device follow-up every 3 months.     ASSESSMENT AND PLAN:  In summary, patient is a 58 year old with a pmhx of ICM c/b RCA STEMI (s/p POBA RCA, FEMI RCA x7, Lcx. LAD 3/2017) c/b cardiogenic shock s/p IABP, MR s/p mitral clip, HTN, DVT on AC, bilateral hemispheric strokes.    The patient has significant ICM with a severely reduced LVEF of 10-20%. He has been exhibiting symptoms of NYHA III HF in the last month with significant impairment in exertional capacity. He is mildly hypervolemic on exam today. He only takes bumex PRN, which we have recommended to take daily from here on. He should have a repeated TTE in the near future. We also recommended a RHC, though the patient declined due to work hour concerns. We have asked him to reconsider and call back if he starts to feel worse. Based on his symptoms, he sounds to be in a low output state. Will reduce BB to 100 mg daily. Considered increasing entresto though the patient previously felt unwell on higher doses, so will hold off for now. We plan to start spironolactone 12.5 mg daily instead. He should follow up in 3 months and will require close monitoring given his significant cardiomyopathy.     - Chronic systolic heart failure/HFrEF (EF  10-20%) secondary to ischemic cardiomyopathy  - NYHA Symptom Class III  - Stage C  - ACE-I/ARB/ARNi: Entresto 49-51 mg BID, did not tolerate higher doses  - BB Metoprolol succinate 200 mg daily, reduce to 100 mg daily  - Aldosterone antagonist Start spironolactone 12.5 mg daily  - SGLT2i: Farxiga 10 mg daily  - SCD prophylaxis ICD  - Fluid status Slight hypervolemia  - Diuretic: Start bumex 0.5 mg daily  - Cardiac Rehab: Refused  - Sleep Apnea Evaluation: Not indicated  - Remote PA Pressure Monitoring (CardioMems) Deferred while medical therapy is optimized  - Remote monitoring: Encouraged to utilize [employer], coaching offerred  - Follow-up 3 months with TTE. RHC as soon as possible    I appreciate the opportunity to participate in the care of Luke Henao . Please do not hesitate to contact me with any further questions.    Discussed with Dr. Collier.    Livan Kay, PGY-5  Cardiovascular Disease Fellow    I have seen and evaluated the patient and agree with the assessment and plan as above  Vipin Collier MD

## 2023-08-29 NOTE — LETTER
"8/29/2023      RE: Luke Henao  [address]  [place] [address]       Dear Colleague,    Thank you for the opportunity to participate in the care of your patient, Luke Henao, at the [facility] [place] [facility]. Please see a copy of my visit note below.    August 29, 2023     I had the pleasure of seeing Luke Henao  in the [facility]. As you know patient has a complex medical history including ICM c/b RCA STEMI (s/p POBA RCA, FEMI RCA x7, Lcx. LAD 3/2017) c/b cardiogenic shock s/p IABP, MR s/p mitral clip, HTN, DVT on AC, bilateral hemispheric strokes. He presents to Advanced Heart Failure Clinic to establish care.      The patient has been followed in [facility] for a number of years, most recently seen on 5/24/23. He was initially transferred to [facility] on 3/27/2017 with cardiogenic shock after an inferior STEMI. Patient had received a POBA to his RCA at that time, as he was considered for CABG. Initial TTE with LVEF of 30-35%. Underwent coronary angiogram with a FEMI to his RCA, LAD, and Lcx. Further hospital course was complicated by cardiogenic shock c/b IABP. He underwent a Mitraclip for ischemic MR. Underwent ICD implant on 10/2017. His GDMT was titrated over the course of the following years. He had improvement in symptoms, though his LVEF continued to be reduced. Most recently 10-20%.     Today, the patient presents alone and presentation was assisted by . The patient relates that he has been feeling more fatigued consistently in the last month. Some days are better than others. He has intermittent leg swelling that improves when he takes bumex. He does not take bumex every day. He has RAYMOND and when he has leg swelling he can only walk short distances and is unable to do a flight of stairs. He initially denied any chest pain or pressure, but then later said it occurs \"sometimes\" though stated he " andrey has SOB and fatigue. No abd swelling. He currently does not have orthopnea though has had this in the last month. Denies bendopnea. He has lightheadedness and dizziness. He takes medications consistently though has run out of entresto. He has tried to take the  mg enstresto at night though feels he became very fatigued and hypotensive. He does not smoke, drink, or use any drugs. He has controlled DM and his last A1c was 6.4%. He is A+. Dry weight sounds to be at most 135 lbs. (Currently 139lbs). No ICD shocks.      PAST MEDICAL HISTORY:  Past Medical History:   Diagnosis Date    Benign essential hypertension     CAD (coronary artery disease) 2017    RCA STEMI s/p balloon angioplasty and multiple Sofie to RCA, LCx, and LAD    Chronic systolic congestive heart failure (H)     History of deep venous thrombosis 2017    left internal jugular (line-associated); left common femoral; was on coumadin    History of mitral valve repair 2017    History of stroke 2017    no residual deficits    ICD (implantable cardioverter-defibrillator) in place     Type 2 diabetes mellitus (H)      FAMILY HISTORY:  Family History   Problem Relation Age of Onset    Hypertension Mother     Diabetes Type 2  Father     Hypertension Father     Myocardial Infarction No family hx of     Cerebrovascular Disease No family hx of     Coronary Artery Disease Early Onset No family hx of     Colon Cancer No family hx of     Prostate Cancer No family hx of      SOCIAL HISTORY:  Social History     Socioeconomic History    Marital status:    Occupational History    Occupation: [employer]   Tobacco Use    Smoking status: Former     Packs/day: 0.50     Years: 30.00     Pack years: 15.00     Types: Cigarettes     Quit date: 2017     Years since quittin.6    Smokeless tobacco: Never   Vaping Use    Vaping Use: Never used   Substance and Sexual Activity    Alcohol use: No    Drug use: No    Sexual activity: Not Currently  "  Social History Narrative    . Remarried.    4 children with first marriage.    3 grand children.    No formal exercise.      CURRENT MEDICATIONS:  Current Outpatient Medications   Medication    ASPIRIN LOW DOSE 81 MG chewable tablet    atorvastatin (LIPITOR) 40 MG tablet    bumetanide (BUMEX) 0.5 MG tablet    cetirizine (ZYRTEC) 10 MG tablet    co-enzyme Q-10 100 MG CAPS capsule    FARXIGA 10 MG TABS tablet    metFORMIN (GLUCOPHAGE XR) 500 MG 24 hr tablet    metoprolol succinate ER (TOPROL XL) 200 MG 24 hr tablet    ONETOUCH ULTRA test strip    sitagliptin (JANUVIA) 100 MG tablet    sacubitril-valsartan (ENTRESTO) 49-51 MG per tablet     No current facility-administered medications for this visit.     ROS:   10-point ROS negative except per HPI above.      EXAM:  BP (!) 158/97 (BP Location: Left arm, Patient Position: Sitting, Cuff Size: Adult Regular)   Pulse 72   Ht 1.56 m (5' 1.42\")   Wt 63 kg (139 lb)   SpO2 96%   BMI 25.91 kg/m    General: appears comfortable, alert and oriented  Head: normocephalic, atraumatic  Eyes: anicteric sclera, EOMI , PERRL  Neck: no adenopathy  Orophyarynx: moist mucosa, no lesions noted  Heart: regular, S1/S2, no murmurs, rubs or gallop. JVD difficult to appreciate  Lungs: CTAB, No wheezing.   Abdomen: soft, non-tender, slightly distended, bowel sounds present, no hepatosplenomegaly  Extremities: Trace BLE edema  Skin: no open lesions noted  Neuro: grossly non-focal     Labs:  Lab Results   Component Value Date    WBC 10.3 08/29/2023    HGB 16.2 08/29/2023    HCT 49.2 08/29/2023     08/29/2023     08/29/2023    POTASSIUM 4.0 08/29/2023    CHLORIDE 104 08/29/2023    CO2 23 08/29/2023    BUN 18.8 08/29/2023    CR 1.08 08/29/2023     (H) 08/29/2023    SED 40 (H) 06/19/2017    NTBNPI 9,984 (H) 04/08/2017    NTBNP 1,533 (H) 08/29/2023    TROPI 0.267 (HH) 04/26/2017    AST 26 08/29/2023    ALT 23 08/29/2023    ALKPHOS 68 08/29/2023    BILITOTAL 1.5 (H) " 08/29/2023    INR 2.3 (A) 12/26/2017     TTE 5/2023:  Left ventricular function is decreased. The ejection fraction is 10-20%  (severely reduced).  Post MitraClip placement in 2017. Mild mitral insufficiency is present.  Global right ventricular function is mildly to moderately reduced.  Moderate tricuspid insufficiency is present.  Moderate pulmonary hypertension is present.  IVC diameter <2.1 cm collapsing >50% with sniff suggests a normal RA pressure  of 3 mmHg.  No pericardial effusion is present.  No significant changes noted.    Device Check 5/24/23:  Device: Medtronic [id number] Visia AF MRI VR  Normal device function.  Mode: VVI 40 bpm  : <0.1%  Intrinsic rhythm: VS 68 bpm  Thoracic Impedance:  Near reference line.   Short V-V intervals: 0  Lead Trends Appear Stable.  Estimated battery longevity to RRT = 6.9 years. Battery voltage = 2.99V.   Atrial Arrhythmia: None  AF [place]: 0%  Anticoagulant: None  Ventricular Arrhythmia: None  Setting Changes: Last shock vector changed to B>AX from AX>B  Patient has an appointment to see Shyanne VENEGAS today.   Plan: Device follow-up every 3 months.     ASSESSMENT AND PLAN:  In summary, patient is a 58 year old with a pmhx of ICM c/b RCA STEMI (s/p POBA RCA, FEMI RCA x7, Lcx. LAD 3/2017) c/b cardiogenic shock s/p IABP, MR s/p mitral clip, HTN, DVT on AC, bilateral hemispheric strokes.    The patient has significant ICM with a severely reduced LVEF of 10-20%. He has been exhibiting symptoms of NYHA III HF in the last month with significant impairment in exertional capacity. He is mildly hypervolemic on exam today. He only takes bumex PRN, which we have recommended to take daily from here on. He should have a repeated TTE in the near future. We also recommended a RHC, though the patient declined due to work hour concerns. We have asked him to reconsider and call back if he starts to feel worse. Based on his symptoms, he sounds to be in a low output state. Will reduce BB  to 100 mg daily. Considered increasing entresto though the patient previously felt unwell on higher doses, so will hold off for now. We plan to start spironolactone 12.5 mg daily instead. He should follow up in 3 months and will require close monitoring given his significant cardiomyopathy.     - Chronic systolic heart failure/HFrEF (EF 10-20%) secondary to ischemic cardiomyopathy  - NYHA Symptom Class III  - Stage C  - ACE-I/ARB/ARNi: Entresto 49-51 mg BID, did not tolerate higher doses  - BB Metoprolol succinate 200 mg daily, reduce to 100 mg daily  - Aldosterone antagonist Start spironolactone 12.5 mg daily  - SGLT2i: Farxiga 10 mg daily  - SCD prophylaxis ICD  - Fluid status Slight hypervolemia  - Diuretic: Start bumex 0.5 mg daily  - Cardiac Rehab: Refused  - Sleep Apnea Evaluation: Not indicated  - Remote PA Pressure Monitoring (CardioMems) Deferred while medical therapy is optimized  - Remote monitoring: [facility]  - Follow-up 3 months with TTE. RHC as soon as possible    I appreciate the opportunity to participate in the care of Luke Henao . Please do not hesitate to contact me with any further questions.    Discussed with Dr. Collier.    Livan Kay, PGY-5  Cardiovascular Disease Fellow    I have seen and evaluated the patient and agree with the assessment and plan as above  Vipin Collier MD

## 2023-09-05 DIAGNOSIS — I50.22 CHRONIC SYSTOLIC CONGESTIVE HEART FAILURE (H): ICD-10-CM

## 2023-09-05 DIAGNOSIS — E11.59 TYPE 2 DIABETES MELLITUS WITH OTHER CIRCULATORY COMPLICATION, WITHOUT LONG-TERM CURRENT USE OF INSULIN (H): ICD-10-CM

## 2023-09-07 RX ORDER — DAPAGLIFLOZIN 10 MG/1
TABLET, FILM COATED ORAL
Qty: 90 TABLET | Refills: 1 | Status: SHIPPED | OUTPATIENT
Start: 2023-09-07 | End: 2024-03-08

## 2023-09-07 NOTE — TELEPHONE ENCOUNTER
FARXIGA 10 MG TABS tablet   90 tablet 0 6/15/2023       Last Office Visit : 8-  Future Office visit:  10-      Sodium Glucose Co-Transport Inhibitor Agents [id number]/05/2023 12:47 AM   .

## 2023-09-15 NOTE — CARE CONFERENCE
Acute Rehab Care Conference/Team Rounds      Type: Patient Conference    Present: Dr Flo Chacon, Poornima Maria LIC, Luke Henao patient, Anette Frost OT, Cony Colvin PT, Layla Jasso SLP, wife, daughter, RN Alida De La Paz      Discharge Barriers/Treatment/Education    Rehab Diagnosis: MI, heart failure, stroke    Active Medical Co-morbidities/Prognosis: diabetes, hypertension, DVT    Safety: Alert and oriented X 4. Able to make needs known. Call light in reach. Daughter at bedside, rooming in.  Up to BR with CGA and walker. No bed alarms on    Pain: C/O  bilateral leg pain during night. Received Tylenol 650 mg with moderate relief.     Medications, Skin, Tubes/Lines Right chest and coccyx dressings CDI. : No tubes or lines present.  Has Diabetic education today    Swallowing/Nutrition:SLP: tolerating regular diet, thin fluids.    Bowel/Bladder: Urinary retention, Able to void at times. PVRs done, Encouraged double void. Will need more teaching on management of  bladder/ self cathing.. Continent of bowel. Last BM on 5/24    Psychosocial: Pt resides with his wife, Nathaniel. Pt reported additional support from his family with goal to return home. Team working towards a safe d/c plan.    ADLs/IADLs: Patient is Mod I in room, demonstrating ability to complete dresssing, grooming and toileting with increased time. Pt continues to require supervision for safety with bathing and tub/shower transfer. Family is pursing a shower stool and grab bars for both toilet and tub to facilitate safety. Patient will have assist from family for IADLS, although patient is capable of doing light IADLs for himself. Educated pt on importance of maintaining activity at home, pt does have goal of returning to work.  Pt has made improvements in UB strength as evidenced by improvement in  and coordination assessment scores. Pt with relative symmetry between R and L compared to admission when L was weaker, but remains BNL on ruthie side.  Physical Therapy Daily Treatment Note    Patient: Shaheen Rivera   : 1989  Diagnosis/ICD-10 Code:  Acute pain of right knee [M25.561]  Referring practitioner: Marta Lal MD  Date of Initial Visit: Type: THERAPY  Noted: 2023  Today's Date: 9/15/2023  Patient seen for 17 sessions    [facility] [place] [address]  [phone] (phone)  [phone] (fax)         Subjective got my brace back, feels better than it did before, She tightened it up some    Objective     Exercises:  -walk on TM prior to session 40 min  -8 inch step ups fwd and lateral x20    -single leg deal lift (10lb, light touch when performing on R LE for balance) 2x10  -side stepping, blue band at ankle, 2x10  -monster walk, blue band at ankle 2x10  -4 in step downs x20   -goblet squats (10lb wt) 2x10 (cues for Wbing through whole foot to engage the quad more)  -squats on rockerboard, 2x20  -SLS with ball throws, 3x30 sec on each LE (mild to mod compensation for SLS at times)  -glute med hip hike on 1-2 inch book, 3x10 for each LE  -HS and calf stretching, 3x20 sec          Assessment/Plan  Pt was able to get new padding and an adjustment to his ACL brace and it is feeling a lot better, better than when it was new. We are continuing to work on core and hip strength to help with the function of his knee. He is exhibiting improved eccentric control on stepping down a 4 inch step. Will increase to 6 inch next visit.          Timed:    Manual Therapy:         mins  74555;  Therapeutic Exercise:    8     mins  07314;     Neuromuscular Soumya:    13    mins  90862;    Therapeutic Activity:     23     mins  01166;     Gait Training:           mins  37137;     Ultrasound:          mins  21273;    Electrical Stimulation:         mins  21216 ( );  Iontophoresis         mins 15282;  Aquatic Therapy         mins 50031;      Untimed:  Electrical Stimulation:         mins  77371 (  );  Traction:         mins  84738;   Dry Needling   (1-2 muscles)             mins 20560 (Self-pay)  Dry Needling (3-4 muscles)           mins 20561 (Self-pay)  Dry Needling Trial              mins DRYNDLTRIAL  (No Charge)    Timed Treatment:   44   mins   Total Treatment:     44   mins    Khushi Garcia PT, CDNT  Physical Therapist    [place] License:[id number]   Recommend continued therapy as OP.      Mobility: Up w/ walker and assist.  PASS score improved from 19/36 to 31/36.  TUG score w/ ww 16.5 seconds. Will look to progress to up in room w/ walker vs cane.  Hopefully progress to cane prior to d/c home.  Recommend OP f/u.      Cognition/Language:SLP: pt making gains in cognitive communication skills. (language skills functional - using interpretor during tx sessions - with pt utilizing English for more basic communications. Does have moderate impairment with complex reasoning, flexible attention and memory, especially for new learning.  Often needs much repetition to carryover tasks requiring more complex thinking and recall.  Insight is variable.  Pt does appear to realize safety risks, and follows recommendations (ie calling for nurse).  At this time would recommend assist with complex IADLS at home (medications, finances).  Further SLP after discharge recommended.    Community Re-Entry: Ambulatory w/ SBA, cane vs walker    Transportation: Not a  due to generalized weakness and stroke recovery trajectory; car transfer not a barrier.     Decision maker: self    Plan of Care and goals reviewed and updated.    Discharge Plan/Recommendations    Fall Precautions: continue    Patient/Family input to goals: have BG meter at home    Overall plan for the patient: Bong has shown some nice functional gains. Bladder emptying is on the cusp of voiding on own vs needing some occasional intermittent catheterization. If any further cathing needs will have RN teach self cath. Now up independent with walker but hoping to transition to just a cane. Building on some balance consistency and endurance from cardiac perspective. On track for discharge as early as Friday if bladder emptying plan established. with some ongoing outpatient cardiac, PT, OT and SLP at [facility]. Will also give HEP prior to discharge.      Utilization Review and Continued Stay Justification    Medical  Necessity Criteria:    For any criteria that is not met, please document reason and plan for discharge, transfer, or modification of plan of care to address.    Requires intensive rehabilitation program to treat functional deficits?: Yes    Requires 3x per week or greater involvement of rehabilitation physician to oversee rehabilitation program?: Yes    Requires rehabilitation nursing interventions?: Yes    Patient is making functional progress?: Yes    There is a potential for additional functional progress? Yes    Patient is participating in therapy 3 hours per day a minimum of 5 days per week or 15 hours per week in 7 day period?:Yes    Has discharge needs that require coordinated discharge planning approach?:Yes      Final Physician Sign off    Statement of Approval: I agree with all the recommendations detailed in this document.     Patient Goals        OT target date for goal attainment: 05/27/17  OT Frequency: Daily  OT goal: hygiene/grooming:  (Goal met)  OT goal: upper body dressing:  (Goal met)  OT goal: lower body dressing:  (Goal met)  OT goal: upper body bathing: Modified independent  OT goal: lower body bathing: Modified independent  OT goal: bed mobility:  (Goal met)  OT Goal: transfer:  (Goal met)  OT goal: toilet transfer/toileting:  (Goal met)  OT goal: meal preparation:  (Goal met)  OT goal: home management: with light demand household tasks, ambulatory level  OT goal: cognitive: Patient/caregiver will verbalize understanding of cognitive assessment results/recommendations as needed for safe discharge planning     PT target date for goal attainment: 05/28/17  PT Frequency: 2/day for 60-75 min/day  PT goal: bed mobility:  (met)  PT goal: transfers: Modified independent, Assistive device  PT goal: gait: Modified independent, 150 feet, Rolling walker  PT goal: stairs: 2 stairs, Rail on both sides, Supervision/stand-by assist  PT goal: perform aerobic activity with stable cardiovascular response: 15  minutes, continuous activity     PT goal 1: Pt will be able to demonstrate safe car transfer at SBA level for community mobility  PT Goal 2: Pt will demonstrate TUG score <14 sec in order to demonstrate decreased falls risk.  PT Goal 3: Pt will be Ind with HEP for continued improvement at d/c  PT Goal 4: Pt will demonstrate safe floor transfer in case of fall in home         SLP target date for goal attainment: 05/28/17  SLP Frequency: daily   SLP goal 1:  (goal met 5/25/27)  SLP goal 2: Pt will utilize compensatory attention and memory strategies to complete mod level recall/new learning and divided attention tasks with 90% accuracy  SLP goal 3: Pt will complete mod level reasoning/ problem solving tasks with 90% accuracy        Goal: Wound Management: Patient and family will demonstrate understanding on signs and symptoms of infection before discharge.  Goal: Medical Management: Patient and family will demonstrate understanding on treatments before discharge.  Goal: Nutrition/Feeding/Swallowing Precautions: Patient will demonstrate understanding on adequate intake before discharge.     Patient/Family Goal: Bladder: Patient will remain free of infection before discharge.

## 2023-10-22 NOTE — PROGRESS NOTES
October 23, 2023     I had the pleasure of seeing Luke Henao  in the [facility]. As you know patient has a complex medical history including ICM c/b RCA STEMI (s/p POBA RCA, FEMI RCA x7, Lcx. LAD 3/2017) c/b cardiogenic shock s/p IABP, MR s/p mitral clip, HTN, DVT on AC, bilateral hemispheric strokes. He presents to Advanced [facility] Failure Clinic for follow-up.     The patient has been followed in [facility] for a number of years, seen on 5/24/23. He was initially transferred to [facility] on 3/27/2017 with cardiogenic shock after an inferior STEMI. Patient had received a POBA to his RCA at that time, as he was considered for CABG. Initial TTE with LVEF of 30-35%. Underwent coronary angiogram with a FEMI to his RCA, LAD, and Lcx. Further hospital course was complicated by cardiogenic shock c/b IABP. He underwent a Mitraclip for ischemic MR. Underwent ICD implant on 10/2017. His GDMT was titrated over the course of the following years. He had improvement in symptoms, though his LVEF continued to be reduced. Most recently 10-20%.      Today, the patient presents alone and presentation was assisted by .  He notes that overall he remains limited with limited exercise tolerance.  He does have some difficulties doing her job which is somewhat controlled but has lower extremity edema by the end of the day.  He does have some stomach issues at times with some abdominal pain which improves with medications for him.  He checks his blood pressure at home and notes it is usually in the 120 130 mmHg systolic range this high blood pressure would be highly atypical for him.  Takes her medications as prescribed and in fact knows them very well.  Takes the Bumex as prescribed as well.  Overall no other complaints.     PAST MEDICAL HISTORY:  Past Medical History:   Diagnosis Date    Benign essential hypertension     CAD (coronary artery disease) 2017    RCA STEMI s/p balloon angioplasty and multiple Sofie to  RCA, LCx, and LAD    Chronic systolic congestive heart failure (H)     History of deep venous thrombosis 2017    left internal jugular (line-associated); left common femoral; was on coumadin    History of mitral valve repair 2017    History of stroke 2017    no residual deficits    ICD (implantable cardioverter-defibrillator) in place     Type 2 diabetes mellitus (H)      FAMILY HISTORY:  Family History   Problem Relation Age of Onset    Hypertension Mother     Diabetes Type 2  Father     Hypertension Father     Myocardial Infarction No family hx of     Cerebrovascular Disease No family hx of     Coronary Artery Disease Early Onset No family hx of     Colon Cancer No family hx of     Prostate Cancer No family hx of      SOCIAL HISTORY:  Social History     Socioeconomic History    Marital status:    Occupational History    Occupation: [employer]   Tobacco Use    Smoking status: Former     Packs/day: 0.50     Years: 30.00     Additional pack years: 0.00     Total pack years: 15.00     Types: Cigarettes     Quit date: 2017     Years since quittin.8    Smokeless tobacco: Never   Vaping Use    Vaping Use: Never used   Substance and Sexual Activity    Alcohol use: No    Drug use: No    Sexual activity: Not Currently   Social History Narrative    . Remarried.    4 children with first marriage.    3 grand children.    No formal exercise.      CURRENT MEDICATIONS:  Current Outpatient Medications   Medication    ASPIRIN LOW DOSE 81 MG chewable tablet    atorvastatin (LIPITOR) 40 MG tablet    bumetanide (BUMEX) 0.5 MG tablet    cetirizine (ZYRTEC) 10 MG tablet    co-enzyme Q-10 100 MG CAPS capsule    dapagliflozin (FARXIGA) 10 MG TABS tablet    metFORMIN (GLUCOPHAGE XR) 500 MG 24 hr tablet    metoprolol succinate ER (TOPROL XL) 100 MG 24 hr tablet    ONETOUCH ULTRA test strip    sacubitril-valsartan (ENTRESTO) 49-51 MG per tablet    sitagliptin (JANUVIA) 100 MG tablet    spironolactone  "(ALDACTONE) 25 MG tablet     No current facility-administered medications for this visit.     ROS:   Constitutional: No fever, chills, or sweats. Weight is 0 lbs 0 oz  ENT: No visual disturbance, ear ache, epistaxis, sore throat.   Allergies/Immunologic: Negative.   Respiratory: No cough, hemoptysis.   Cardiovascular: As per HPI.   GI: No nausea, vomiting, hematemesis, melena, or hematochezia.   : No urinary frequency, dysuria, or hematuria.   Integument: Negative.   Psychiatric: Pleasant, no major depression noted  Neuro: No focal neurological deficits noted  Endocrinology: Negative.   Musculoskeletal: As per HPI.      EXAM:  BP (!) 182/119 (BP Location: Left arm, Patient Position: Chair, Cuff Size: Adult Regular)   Pulse 85   Ht 1.574 m (5' 1.97\")   Wt 62.5 kg (137 lb 12.8 oz)   SpO2 100%   BMI 25.23 kg/m    General: appears comfortable, alert and oriented  Head: normocephalic, atraumatic  Eyes: anicteric sclera, EOMI , PERRL  Neck: no adenopathy  Orophyarynx: moist mucosa, no lesions noted  Heart: regular, S1/S2, no murmurs, rubs or gallop. JVD difficult to appreciate  Lungs: CTAB, No wheezing.   Abdomen: soft, non-tender, slightly distended, bowel sounds present, no hepatosplenomegaly  Extremities: Trace BLE edema  Skin: no open lesions noted  Neuro: grossly non-focal     Labs:  Lab Results   Component Value Date    WBC 10.3 08/29/2023    HGB 16.2 08/29/2023    HCT 49.2 08/29/2023     08/29/2023     08/29/2023    POTASSIUM 4.0 08/29/2023    CHLORIDE 104 08/29/2023    CO2 23 08/29/2023    BUN 18.8 08/29/2023    CR 1.08 08/29/2023     (H) 08/29/2023    SED 40 (H) 06/19/2017    NTBNPI 9,984 (H) 04/08/2017    NTBNP 1,533 (H) 08/29/2023    TROPI 0.267 (HH) 04/26/2017    AST 26 08/29/2023    ALT 23 08/29/2023    ALKPHOS 68 08/29/2023    BILITOTAL 1.5 (H) 08/29/2023    INR 2.3 (A) 12/26/2017     TTE 5/2023:  Left ventricular function is decreased. The ejection fraction is 10-20% (severely " reduced).  Post MitraClip placement in 2017. Mild mitral insufficiency is present.  Global right ventricular function is mildly to moderately reduced.  Moderate tricuspid insufficiency is present. Moderate pulmonary hypertension is present.  IVC diameter <2.1 cm collapsing >50% with sniff suggests a normal RA pressure of 3 mmHg.  No pericardial effusion is present. No significant changes noted.     Device Check 5/24/23:  Device: Medtronic [id number] Visia AF MRI VR  Normal device function.  Mode: VVI 40 bpm  : <0.1%  Intrinsic rhythm: VS 68 bpm  Thoracic Impedance:  Near reference line.   Short V-V intervals: 0  Lead Trends Appear Stable.  Estimated battery longevity to RRT = 6.9 years. Battery voltage = 2.99V.   Atrial Arrhythmia: None  AF Orick: 0%  Anticoagulant: None  Ventricular Arrhythmia: None  Setting Changes: Last shock vector changed to B>AX from AX>B  Patient has an appointment to see Shyanne VENEGAS today.   Plan: Device follow-up every 3 months.     ASSESSMENT AND PLAN:  In summary, patient is a 58 year old with a pmhx of ICM c/b RCA STEMI (s/p POBA RCA, FEMI RCA x7, Lcx. LAD 3/2017) c/b cardiogenic shock s/p IABP, MR s/p mitral clip, HTN, DVT on AC, bilateral hemispheric strokes.  Overall continues to do relatively well however limited at work.  This might be related to the reduced ejection fraction.  Echo was performed today which I have reviewed in person.  In the ejection fraction seem to be is may be slightly better around 25%.  AI TR and MR noted with a MitraClip in place.  Please note that this was in the setting of hypertension though.  Today we will do the following we will increase the Entresto from 49 mg twice daily to 97 mg twice daily dosing.  We discussed that we should do a right heart catheterization to reevaluate his filling pressures especially in the setting of edema in the evening times as well as to check his cardiac index.  He was agreeable to this now and we will proceed as  discussed.  Labs reviewed.  No other medication changes today and we will reevaluate Bumex use and other medications once in the Cath Lab.  - Chronic systolic heart failure/HFrEF (EF 10-20%) secondary to ischemic cardiomyopathy  - NYHA Symptom Class III,  Stage C  - ACE-I/ARB/ARNi: Increase Entresto to 97 mg twice daily  - BB Metoprolol succinate 200 mg daily, reduce to 100 mg daily  - Aldosterone antagonist continue daily spironolactone   - SGLT2i: Farxiga 10 mg daily  - SCD prophylaxis ICD in place  - Fluid status Slight hypervolemia  - Diuretic: bumex 0.5 mg daily, continue.  We will reevaluate after the Cath Lab  - Cardiac Rehab: Refused  - Sleep Apnea Evaluation: Not indicated  - Remote PA Pressure Monitoring (CardioMems) Deferred while medical therapy is optimized  - Remote monitoring: Encouraged to utilize OpenLabelt, coaching offerred    I appreciate the opportunity to participate in the care of Luke Henao . Please do not hesitate to contact me with any further questions.    Sincerely,   Vipin Collier MD  [facility] Division of Cardiology

## 2023-10-23 ENCOUNTER — LAB (OUTPATIENT)
Dept: LAB | Facility: CLINIC | Age: 58
End: 2023-10-23
Attending: INTERNAL MEDICINE
Payer: COMMERCIAL

## 2023-10-23 ENCOUNTER — ANCILLARY PROCEDURE (OUTPATIENT)
Dept: CARDIOLOGY | Facility: CLINIC | Age: 58
End: 2023-10-23
Attending: INTERNAL MEDICINE
Payer: COMMERCIAL

## 2023-10-23 VITALS
WEIGHT: 137.8 LBS | HEART RATE: 85 BPM | OXYGEN SATURATION: 100 % | BODY MASS INDEX: 25.36 KG/M2 | HEIGHT: 62 IN | SYSTOLIC BLOOD PRESSURE: 182 MMHG | DIASTOLIC BLOOD PRESSURE: 119 MMHG

## 2023-10-23 DIAGNOSIS — I50.22 CHRONIC SYSTOLIC CONGESTIVE HEART FAILURE (H): ICD-10-CM

## 2023-10-23 DIAGNOSIS — Z98.890 HISTORY OF MITRAL VALVE REPAIR: ICD-10-CM

## 2023-10-23 DIAGNOSIS — I10 BENIGN ESSENTIAL HYPERTENSION: ICD-10-CM

## 2023-10-23 DIAGNOSIS — E78.2 MIXED HYPERLIPIDEMIA: Primary | ICD-10-CM

## 2023-10-23 DIAGNOSIS — I25.5 ISCHEMIC CARDIOMYOPATHY: ICD-10-CM

## 2023-10-23 DIAGNOSIS — M79.10 MYALGIA DUE TO STATIN: ICD-10-CM

## 2023-10-23 DIAGNOSIS — T46.6X5A MYALGIA DUE TO STATIN: ICD-10-CM

## 2023-10-23 LAB
ALBUMIN SERPL BCG-MCNC: 4.5 G/DL (ref 3.5–5.2)
ALP SERPL-CCNC: 65 U/L (ref 40–129)
ALT SERPL W P-5'-P-CCNC: 20 U/L (ref 0–70)
ANION GAP SERPL CALCULATED.3IONS-SCNC: 11 MMOL/L (ref 7–15)
AST SERPL W P-5'-P-CCNC: 24 U/L (ref 0–45)
BI-PLANE LVEF ECHO: NORMAL
BILIRUB SERPL-MCNC: 0.7 MG/DL
BUN SERPL-MCNC: 23.5 MG/DL (ref 6–20)
CALCIUM SERPL-MCNC: 9.2 MG/DL (ref 8.6–10)
CHLORIDE SERPL-SCNC: 100 MMOL/L (ref 98–107)
CREAT SERPL-MCNC: 1.37 MG/DL (ref 0.67–1.17)
DEPRECATED HCO3 PLAS-SCNC: 26 MMOL/L (ref 22–29)
EGFRCR SERPLBLD CKD-EPI 2021: 61 ML/MIN/1.73M2
ERYTHROCYTE [DISTWIDTH] IN BLOOD BY AUTOMATED COUNT: 13.2 % (ref 10–15)
GLUCOSE SERPL-MCNC: 167 MG/DL (ref 70–99)
HCT VFR BLD AUTO: 51.9 % (ref 40–53)
HGB BLD-MCNC: 17 G/DL (ref 13.3–17.7)
MCH RBC QN AUTO: 31 PG (ref 26.5–33)
MCHC RBC AUTO-ENTMCNC: 32.8 G/DL (ref 31.5–36.5)
MCV RBC AUTO: 95 FL (ref 78–100)
NT-PROBNP SERPL-MCNC: 988 PG/ML (ref 0–900)
PLATELET # BLD AUTO: 222 10E3/UL (ref 150–450)
POTASSIUM SERPL-SCNC: 4.8 MMOL/L (ref 3.4–5.3)
PROT SERPL-MCNC: 8.1 G/DL (ref 6.4–8.3)
RBC # BLD AUTO: 5.48 10E6/UL (ref 4.4–5.9)
SODIUM SERPL-SCNC: 137 MMOL/L (ref 135–145)
WBC # BLD AUTO: 9.8 10E3/UL (ref 4–11)

## 2023-10-23 PROCEDURE — 80053 COMPREHEN METABOLIC PANEL: CPT | Performed by: PATHOLOGY

## 2023-10-23 PROCEDURE — 93306 TTE W/DOPPLER COMPLETE: CPT | Mod: GC | Performed by: INTERNAL MEDICINE

## 2023-10-23 PROCEDURE — 83880 ASSAY OF NATRIURETIC PEPTIDE: CPT | Performed by: PATHOLOGY

## 2023-10-23 PROCEDURE — 85027 COMPLETE CBC AUTOMATED: CPT | Performed by: PATHOLOGY

## 2023-10-23 PROCEDURE — 36415 COLL VENOUS BLD VENIPUNCTURE: CPT | Performed by: PATHOLOGY

## 2023-10-23 PROCEDURE — 99213 OFFICE O/P EST LOW 20 MIN: CPT | Performed by: INTERNAL MEDICINE

## 2023-10-23 PROCEDURE — 99214 OFFICE O/P EST MOD 30 MIN: CPT | Mod: 25 | Performed by: INTERNAL MEDICINE

## 2023-10-23 RX ORDER — SACUBITRIL AND VALSARTAN 97; 103 MG/1; MG/1
1 TABLET, FILM COATED ORAL 2 TIMES DAILY
Qty: 180 TABLET | Refills: 3 | Status: SHIPPED | OUTPATIENT
Start: 2023-10-23 | End: 2023-12-13

## 2023-10-23 RX ORDER — LIDOCAINE 40 MG/G
CREAM TOPICAL
Status: CANCELLED | OUTPATIENT
Start: 2023-10-23

## 2023-10-23 ASSESSMENT — PAIN SCALES - GENERAL: PAINLEVEL: NO PAIN (0)

## 2023-10-23 NOTE — PATIENT INSTRUCTIONS
Dr. Collier recommends:    Increase Entresto to  MG twice daily.    Right heart cath for the near future, after 14 days.    Follow up clinic visit with Dr. Collier in 3 months with labs the same day.    Thank you for your visit today.  Please call me with any questions or concerns.   Glenn Sun RN  Cardiology Care Coordinator  [phone]     Pre-procedure instructions - Right heart catheterization  Patient Education    Your arrival time is TBD.  Location is [address] Waiting Room  Please plan on being at the hospital all day.  At any time, emergencies and/or urgent cases may come up which could delay the start of your procedure.    Pre-procedure instructions - Right heart catheterization  No solid food for 8 hours prior  Nothing to drink 2 hours prior to arrival time  You can take your morning medications (except diabetic and blood thinners) with sips of water  We recommend you arrange for a ride to drop you off and pick you up, in the instance, you are unable to drive home, however you should be able to function as you normally would after the procedure    Diabetic Medication Instructions  Typical instructions for insulin diabetic medication holding are below. However, please reach out to your Primary Care Provider or Endocrinologist for specific instructions  DO NOT take any oral diabetic medication, short-acting diabetes medications/insulin, humalog or regular insulin the morning of your test  Take   dose of long-acting insulin (Lantus, Levemir) the day of your test  Remember to  bring your glucometer and insulin with you to take after your test if needed  DO NOT take injectable GLP-1 agonists semaglutide (Ozempic, Wegovy), dulaglutide (Trulicity), exenatide ER (Bydureon), tirzepatide (Mounjaro), or oral semaglutide (Rybelsus) for 7 days prior your procedure  Hold once daily injectable GLP-1 agonists exenatide  (Byetta), liraglutide (Saxenda, Victoza) the day before and day of your procedure                Anticoagulation Medication Instructions   NA    You will need to follow up with one of our cardiology APPs 1-2 weeks after your procedure. If you need help scheduling or rescheduling your appointment, please call [phone]

## 2023-10-23 NOTE — NURSING NOTE
Chief Complaint   Patient presents with    Follow Up     Chronic systolic congestive heart failure (H)       Vitals were taken and medications reconciled.    Alexx Linda, EMT  9:11 AM

## 2023-10-23 NOTE — LETTER
10/23/2023      RE: Luke Henao  [address]  [facility] [address]       Dear Colleague,    Thank you for the opportunity to participate in the care of your patient, Luke Henao, at the [facility] HEART [facility]IN[facility] [place] [facility]. Please see a copy of my visit note below.    October 23, 2023     I had the pleasure of seeing Luke Henao  in the [facility] Advanced Heart Failure Clinic. As you know patient has a complex medical history including ICM c/b RCA STEMI (s/p POBA RCA, FEMI RCA x7, Lcx. LAD 3/2017) c/b cardiogenic shock s/p IABP, MR s/p mitral clip, HTN, DVT on AC, bilateral hemispheric strokes. He presents to Advanced Heart Failure Clinic for follow-up.     The patient has been followed in [facility] clinic for a number of years, seen on 5/24/23. He was initially transferred to [facility] on 3/27/2017 with cardiogenic shock after an inferior STEMI. Patient had received a POBA to his RCA at that time, as he was considered for CABG. Initial TTE with LVEF of 30-35%. Underwent coronary angiogram with a FEMI to his RCA, LAD, and Lcx. Further hospital course was complicated by cardiogenic shock c/b IABP. He underwent a Mitraclip for ischemic MR. Underwent ICD implant on 10/2017. His GDMT was titrated over the course of the following years. He had improvement in symptoms, though his LVEF continued to be reduced. Most recently 10-20%.      Today, the patient presents alone and presentation was assisted by .  He notes that overall he remains limited with limited exercise tolerance.  He does have some difficulties doing her job which is somewhat controlled but has lower extremity edema by the end of the day.  He does have some stomach issues at times with some abdominal pain which improves with medications for him.  He checks his blood pressure at home and notes it is usually in the 120 130 mmHg systolic range this high blood pressure would be highly atypical for  him.  Takes her medications as prescribed and in fact knows them very well.  Takes the Bumex as prescribed as well.  Overall no other complaints.     PAST MEDICAL HISTORY:  Past Medical History:   Diagnosis Date    Benign essential hypertension     CAD (coronary artery disease) 2017    RCA STEMI s/p balloon angioplasty and multiple Sofie to RCA, LCx, and LAD    Chronic systolic congestive heart failure (H)     History of deep venous thrombosis 2017    left internal jugular (line-associated); left common femoral; was on coumadin    History of mitral valve repair 2017    History of stroke 2017    no residual deficits    ICD (implantable cardioverter-defibrillator) in place     Type 2 diabetes mellitus (H)      FAMILY HISTORY:  Family History   Problem Relation Age of Onset    Hypertension Mother     Diabetes Type 2  Father     Hypertension Father     Myocardial Infarction No family hx of     Cerebrovascular Disease No family hx of     Coronary Artery Disease Early Onset No family hx of     Colon Cancer No family hx of     Prostate Cancer No family hx of      SOCIAL HISTORY:  Social History     Socioeconomic History    Marital status:    Occupational History    Occupation: Hearing [employer]   Tobacco Use    Smoking status: Former     Packs/day: 0.50     Years: 30.00     Additional pack years: 0.00     Total pack years: 15.00     Types: Cigarettes     Quit date: 2017     Years since quittin.8    Smokeless tobacco: Never   Vaping Use    Vaping Use: Never used   Substance and Sexual Activity    Alcohol use: No    Drug use: No    Sexual activity: Not Currently   Social History Narrative    . Remarried.    4 children with first marriage.    3 grand children.    No formal exercise.      CURRENT MEDICATIONS:  Current Outpatient Medications   Medication    ASPIRIN LOW DOSE 81 MG chewable tablet    atorvastatin (LIPITOR) 40 MG tablet    bumetanide (BUMEX) 0.5 MG tablet    cetirizine (ZYRTEC) 10 MG  "tablet    co-enzyme Q-10 100 MG CAPS capsule    dapagliflozin (FARXIGA) 10 MG TABS tablet    metFORMIN (GLUCOPHAGE XR) 500 MG 24 hr tablet    metoprolol succinate ER (TOPROL XL) 100 MG 24 hr tablet    ONETOUCH ULTRA test strip    sacubitril-valsartan (ENTRESTO) 49-51 MG per tablet    sitagliptin (JANUVIA) 100 MG tablet    spironolactone (ALDACTONE) 25 MG tablet     No current facility-administered medications for this visit.     ROS:   Constitutional: No fever, chills, or sweats. Weight is 0 lbs 0 oz  ENT: No visual disturbance, ear ache, epistaxis, sore throat.   Allergies/Immunologic: Negative.   Respiratory: No cough, hemoptysis.   Cardiovascular: As per HPI.   GI: No nausea, vomiting, hematemesis, melena, or hematochezia.   : No urinary frequency, dysuria, or hematuria.   Integument: Negative.   Psychiatric: Pleasant, no major depression noted  Neuro: No focal neurological deficits noted  Endocrinology: Negative.   Musculoskeletal: As per HPI.      EXAM:  BP (!) 182/119 (BP Location: Left arm, Patient Position: Chair, Cuff Size: Adult Regular)   Pulse 85   Ht 1.574 m (5' 1.97\")   Wt 62.5 kg (137 lb 12.8 oz)   SpO2 100%   BMI 25.23 kg/m    General: appears comfortable, alert and oriented  Head: normocephalic, atraumatic  Eyes: anicteric sclera, EOMI , PERRL  Neck: no adenopathy  Orophyarynx: moist mucosa, no lesions noted  Heart: regular, S1/S2, no murmurs, rubs or gallop. JVD difficult to appreciate  Lungs: CTAB, No wheezing.   Abdomen: soft, non-tender, slightly distended, bowel sounds present, no hepatosplenomegaly  Extremities: Trace BLE edema  Skin: no open lesions noted  Neuro: grossly non-focal     Labs:  Lab Results   Component Value Date    WBC 10.3 08/29/2023    HGB 16.2 08/29/2023    HCT 49.2 08/29/2023     08/29/2023     08/29/2023    POTASSIUM 4.0 08/29/2023    CHLORIDE 104 08/29/2023    CO2 23 08/29/2023    BUN 18.8 08/29/2023    CR 1.08 08/29/2023     (H) 08/29/2023    " SED 40 (H) 06/19/2017    NTBNPI 9,984 (H) 04/08/2017    NTBNP 1,533 (H) 08/29/2023    TROPI 0.267 (HH) 04/26/2017    AST 26 08/29/2023    ALT 23 08/29/2023    ALKPHOS 68 08/29/2023    BILITOTAL 1.5 (H) 08/29/2023    INR 2.3 (A) 12/26/2017     TTE 5/2023:  Left ventricular function is decreased. The ejection fraction is 10-20% (severely reduced).  Post MitraClip placement in 2017. Mild mitral insufficiency is present.  Global right ventricular function is mildly to moderately reduced.  Moderate tricuspid insufficiency is present. Moderate pulmonary hypertension is present.  IVC diameter <2.1 cm collapsing >50% with sniff suggests a normal RA pressure of 3 mmHg.  No pericardial effusion is present. No significant changes noted.     Device Check 5/24/23:  Device: Syncurity [id number] Visia AF MRI VR  Normal device function.  Mode: VVI 40 bpm  : <0.1%  Intrinsic rhythm: VS 68 bpm  Thoracic Impedance:  Near reference line.   Short V-V intervals: 0  Lead Trends Appear Stable.  Estimated battery longevity to RRT = 6.9 years. Battery voltage = 2.99V.   Atrial Arrhythmia: None  AF Yancey: 0%  Anticoagulant: None  Ventricular Arrhythmia: None  Setting Changes: Last shock vector changed to B>AX from AX>B  Patient has an appointment to see Shyanne VENEGAS today.   Plan: Device follow-up every 3 months.     ASSESSMENT AND PLAN:  In summary, patient is a 58 year old with a pmhx of ICM c/b RCA STEMI (s/p POBA RCA, FEMI RCA x7, Lcx. LAD 3/2017) c/b cardiogenic shock s/p IABP, MR s/p mitral clip, HTN, DVT on AC, bilateral hemispheric strokes.  Overall continues to do relatively well however limited at work.  This might be related to the reduced ejection fraction.  Echo was performed today which I have reviewed in person.  In the ejection fraction seem to be is may be slightly better around 25%.  AI TR and MR noted with a MitraClip in place.  Please note that this was in the setting of hypertension though.  Today we will do the  following we will increase the Entresto from 49 mg twice daily to 97 mg twice daily dosing.  We discussed that we should do a right heart catheterization to reevaluate his filling pressures especially in the setting of edema in the evening times as well as to check his cardiac index.  He was agreeable to this now and we will proceed as discussed.  Labs reviewed.  No other medication changes today and we will reevaluate Bumex use and other medications once in the Cath Lab.  - Chronic systolic heart failure/HFrEF (EF 10-20%) secondary to ischemic cardiomyopathy  - NYHA Symptom Class III,  Stage C  - ACE-I/ARB/ARNi: Increase Entresto to 97 mg twice daily  - BB Metoprolol succinate 200 mg daily, reduce to 100 mg daily  - Aldosterone antagonist continue daily spironolactone   - SGLT2i: Farxiga 10 mg daily  - SCD prophylaxis ICD in place  - Fluid status Slight hypervolemia  - Diuretic: bumex 0.5 mg daily, continue.  We will reevaluate after the Cath Lab  - Cardiac Rehab: Refused  - Sleep Apnea Evaluation: Not indicated  - Remote PA Pressure Monitoring (CardioMems) Deferred while medical therapy is optimized  - Remote monitoring: Encouraged to utilize Constant Insighthart, coaching offerred    I appreciate the opportunity to participate in the care of Luke Henao . Please do not hesitate to contact me with any further questions.    Sincerely,   Vipin Collier MD  [facility] Division of Cardiology

## 2023-10-25 DIAGNOSIS — E11.9 TYPE 2 DIABETES MELLITUS WITHOUT COMPLICATION, WITHOUT LONG-TERM CURRENT USE OF INSULIN (H): ICD-10-CM

## 2023-10-25 DIAGNOSIS — I25.10 CORONARY ARTERY DISEASE INVOLVING NATIVE CORONARY ARTERY OF NATIVE HEART WITHOUT ANGINA PECTORIS: ICD-10-CM

## 2023-10-25 RX ORDER — ATORVASTATIN CALCIUM 40 MG/1
40 TABLET, FILM COATED ORAL DAILY
Qty: 90 TABLET | Refills: 3 | Status: SHIPPED | OUTPATIENT
Start: 2023-10-25 | End: 2024-09-24

## 2023-10-25 RX ORDER — SITAGLIPTIN 100 MG/1
100 TABLET, FILM COATED ORAL DAILY
Qty: 90 TABLET | Refills: 3 | Status: SHIPPED | OUTPATIENT
Start: 2023-10-25 | End: 2024-09-30

## 2023-10-25 NOTE — TELEPHONE ENCOUNTER
10/23/2023  [facility] Vipin Mota MD  Cardiovascular Disease   LVD 10/23/23, last LDL 5/24/23

## 2023-10-30 DIAGNOSIS — I25.10 CORONARY ARTERY DISEASE INVOLVING NATIVE CORONARY ARTERY OF NATIVE HEART WITHOUT ANGINA PECTORIS: ICD-10-CM

## 2023-10-30 NOTE — TELEPHONE ENCOUNTER
[facility] Call Center    Phone Message    May a detailed message be left on voicemail: yes     Reason for Call: Medication Refill Request    Has the patient contacted the pharmacy for the refill? Yes   Name of medication being requested: ASPIRIN LOW DOSE 81 MG chewable tablet     Pharmacy: [facility]Y #[id number] [address] [facility]    Date medication is needed: 10/30/2023       Action Taken: Other: Cardiology    Travel Screening: Not Applicable    Thank you!  [facility]

## 2023-10-31 RX ORDER — ASPIRIN 81 MG/1
81 TABLET, CHEWABLE ORAL DAILY
Qty: 90 TABLET | Refills: 3 | Status: SHIPPED | OUTPATIENT
Start: 2023-10-31 | End: 2024-09-24

## 2023-10-31 NOTE — TELEPHONE ENCOUNTER
ASPIRIN LOW DOSE 81 MG chewable tablet   90 tablet 3 10/21/2022     10/23/2023  [facility]    Vipin Collier MD  Cardiovascular Disease    Reviewed By    Vipin Collier MD on 10/23/2023  9:56 AM     creat H    Routed because:  creat H.  labs reordered.not drawn. Rf?

## 2023-11-08 ENCOUNTER — APPOINTMENT (OUTPATIENT)
Dept: MEDSURG UNIT | Facility: CLINIC | Age: 58
End: 2023-11-08
Attending: INTERNAL MEDICINE
Payer: COMMERCIAL

## 2023-11-08 ENCOUNTER — HOSPITAL ENCOUNTER (OUTPATIENT)
Facility: CLINIC | Age: 58
Discharge: HOME | End: 2023-11-08
Attending: INTERNAL MEDICINE | Admitting: INTERNAL MEDICINE
Payer: COMMERCIAL

## 2023-11-08 ENCOUNTER — APPOINTMENT (OUTPATIENT)
Dept: LAB | Facility: CLINIC | Age: 58
End: 2023-11-08
Attending: INTERNAL MEDICINE
Payer: COMMERCIAL

## 2023-11-08 VITALS
SYSTOLIC BLOOD PRESSURE: 144 MMHG | DIASTOLIC BLOOD PRESSURE: 99 MMHG | OXYGEN SATURATION: 99 % | HEART RATE: 91 BPM | TEMPERATURE: 97.8 F

## 2023-11-08 DIAGNOSIS — M79.10 MYALGIA DUE TO STATIN: ICD-10-CM

## 2023-11-08 DIAGNOSIS — T46.6X5A MYALGIA DUE TO STATIN: ICD-10-CM

## 2023-11-08 DIAGNOSIS — E78.2 MIXED HYPERLIPIDEMIA: ICD-10-CM

## 2023-11-08 DIAGNOSIS — I50.22 CHRONIC SYSTOLIC CONGESTIVE HEART FAILURE (H): ICD-10-CM

## 2023-11-08 DIAGNOSIS — I25.5 ISCHEMIC CARDIOMYOPATHY: ICD-10-CM

## 2023-11-08 DIAGNOSIS — I10 BENIGN ESSENTIAL HYPERTENSION: ICD-10-CM

## 2023-11-08 DIAGNOSIS — Z98.890 HISTORY OF MITRAL VALVE REPAIR: ICD-10-CM

## 2023-11-08 LAB
ALBUMIN SERPL BCG-MCNC: 4.4 G/DL (ref 3.5–5.2)
ALP SERPL-CCNC: 64 U/L (ref 40–129)
ALT SERPL W P-5'-P-CCNC: 22 U/L (ref 0–70)
ANION GAP SERPL CALCULATED.3IONS-SCNC: 12 MMOL/L (ref 7–15)
AST SERPL W P-5'-P-CCNC: 29 U/L (ref 0–45)
BILIRUB SERPL-MCNC: 0.8 MG/DL
BUN SERPL-MCNC: 23.1 MG/DL (ref 6–20)
CALCIUM SERPL-MCNC: 9.2 MG/DL (ref 8.6–10)
CHLORIDE SERPL-SCNC: 104 MMOL/L (ref 98–107)
CREAT SERPL-MCNC: 1.22 MG/DL (ref 0.67–1.17)
DEPRECATED HCO3 PLAS-SCNC: 22 MMOL/L (ref 22–29)
EGFRCR SERPLBLD CKD-EPI 2021: 70 ML/MIN/1.73M2
ERYTHROCYTE [DISTWIDTH] IN BLOOD BY AUTOMATED COUNT: 13.1 % (ref 10–15)
GLUCOSE SERPL-MCNC: 125 MG/DL (ref 70–99)
HCT VFR BLD AUTO: 49.2 % (ref 40–53)
HGB BLD-MCNC: 16.6 G/DL (ref 13.3–17.7)
INR PPP: 1.01 (ref 0.85–1.15)
MCH RBC QN AUTO: 31.7 PG (ref 26.5–33)
MCHC RBC AUTO-ENTMCNC: 33.7 G/DL (ref 31.5–36.5)
MCV RBC AUTO: 94 FL (ref 78–100)
NT-PROBNP SERPL-MCNC: 1515 PG/ML (ref 0–900)
PLATELET # BLD AUTO: 214 10E3/UL (ref 150–450)
POTASSIUM SERPL-SCNC: 5.5 MMOL/L (ref 3.4–5.3)
PROT SERPL-MCNC: 8 G/DL (ref 6.4–8.3)
RBC # BLD AUTO: 5.24 10E6/UL (ref 4.4–5.9)
SODIUM SERPL-SCNC: 138 MMOL/L (ref 135–145)
WBC # BLD AUTO: 11.4 10E3/UL (ref 4–11)

## 2023-11-08 PROCEDURE — 93451 RIGHT HEART CATH: CPT | Performed by: INTERNAL MEDICINE

## 2023-11-08 PROCEDURE — C1751 CATH, INF, PER/CENT/MIDLINE: HCPCS | Performed by: INTERNAL MEDICINE

## 2023-11-08 PROCEDURE — 93451 RIGHT HEART CATH: CPT | Mod: 26 | Performed by: INTERNAL MEDICINE

## 2023-11-08 PROCEDURE — 999N000132 HC STATISTIC PP CARE STAGE 1

## 2023-11-08 PROCEDURE — 272N000001 HC OR GENERAL SUPPLY STERILE: Performed by: INTERNAL MEDICINE

## 2023-11-08 PROCEDURE — 93463 DRUG ADMIN & HEMODYNMIC MEAS: CPT | Mod: GC | Performed by: INTERNAL MEDICINE

## 2023-11-08 PROCEDURE — 80053 COMPREHEN METABOLIC PANEL: CPT | Performed by: INTERNAL MEDICINE

## 2023-11-08 PROCEDURE — 250N000011 HC RX IP 250 OP 636: Mod: JZ | Performed by: INTERNAL MEDICINE

## 2023-11-08 PROCEDURE — C1894 INTRO/SHEATH, NON-LASER: HCPCS | Performed by: INTERNAL MEDICINE

## 2023-11-08 PROCEDURE — 93463 DRUG ADMIN & HEMODYNMIC MEAS: CPT | Performed by: INTERNAL MEDICINE

## 2023-11-08 PROCEDURE — 83880 ASSAY OF NATRIURETIC PEPTIDE: CPT | Performed by: INTERNAL MEDICINE

## 2023-11-08 PROCEDURE — 36415 COLL VENOUS BLD VENIPUNCTURE: CPT | Performed by: INTERNAL MEDICINE

## 2023-11-08 PROCEDURE — 258N000003 HC RX IP 258 OP 636: Performed by: INTERNAL MEDICINE

## 2023-11-08 PROCEDURE — 85610 PROTHROMBIN TIME: CPT | Performed by: INTERNAL MEDICINE

## 2023-11-08 PROCEDURE — 250N000009 HC RX 250: Performed by: INTERNAL MEDICINE

## 2023-11-08 PROCEDURE — 999N000142 HC STATISTIC PROCEDURE PREP ONLY

## 2023-11-08 PROCEDURE — 85027 COMPLETE CBC AUTOMATED: CPT | Performed by: INTERNAL MEDICINE

## 2023-11-08 RX ORDER — LIDOCAINE 40 MG/G
CREAM TOPICAL
Status: DISCONTINUED | OUTPATIENT
Start: 2023-11-08 | End: 2023-11-08 | Stop reason: HOSPADM

## 2023-11-08 RX ADMIN — SODIUM NITROPRUSSIDE 0.5 MCG/KG/MIN: 25 INJECTION, SOLUTION, CONCENTRATE INTRAVENOUS at 10:14

## 2023-11-08 ASSESSMENT — ACTIVITIES OF DAILY LIVING (ADL)
ADLS_ACUITY_SCORE: 37

## 2023-11-08 NOTE — PROGRESS NOTES
Pt back from CCL s/p RHC.  VSS.  Pt alert and oriented x4.  Pt denies any pain.  Rt neck site F/D/I.  1040-Pt tolerated po well.  Discharge instructions went over with and given to pt via .  Pt has no questions.  1045-Pt discharged to home with family.

## 2023-11-08 NOTE — DISCHARGE INSTRUCTIONS
[facility]                        Interventional Cardiology  Discharge Instructions   Post Right Heart Cath       AFTER YOU GO HOME:  DO drink plenty of fluids  DO resume your regular diet and medications unless otherwise instructed by your Primary Physician  Do Not scrub the procedure site vigorously  No lotion or powder to the puncture site for 3 days    CALL YOUR PRIMARY PHYSICIAN IF: You may resume all normal activity.  Monitor neck site for bleeding, swelling, or voice changes. If you notice bleeding or swelling immediately apply pressure to the site and call number below to speak with Cardiology Fellow.  If you experience any changes in your breathing you should call your doctor immediately or come to the closest Emergency Department.  Do not drive yourself.    ADDITIONAL INSTRUCTIONS: Medications: You are to resume all home medications including anticoagulation therapy unless otherwise advised by your primary cardiologist or nurse coordinator.    Follow Up: Per your primary cardiology team    If you have any questions or concerns regarding your procedure site please call [phone] at anytime and ask for Cardiology Fellow on call.  They are available 24 hours a day.  You may also contact the Cardiology Clinic after hours number at [phone].                                                       Telephone Numbers [phone] Monday-Friday 8:00 am to 4:30 pm    [phone] After 4:30 pm Monday-Friday, Weekends & Holidays  Ask for Interventional Cardiologist on call. Someone is on call 24 hours/day   [facility] toll free number [phone] Monday-Friday 8:00 am to 4:30 pm   [facility] Emergency Dept [phone]

## 2023-11-08 NOTE — PROGRESS NOTES
Patient prepped for right heart catheterization. VSS. Consent signed. Tristanian  present, does speak some English. Friend Keith will drive patient home, in Gold Waiting room.

## 2023-11-29 ENCOUNTER — ANCILLARY PROCEDURE (OUTPATIENT)
Dept: CARDIOLOGY | Facility: CLINIC | Age: 58
End: 2023-11-29
Attending: INTERNAL MEDICINE
Payer: COMMERCIAL

## 2023-11-29 DIAGNOSIS — I42.9 CARDIOMYOPATHY (H): ICD-10-CM

## 2023-11-29 PROCEDURE — 93295 DEV INTERROG REMOTE 1/2/MLT: CPT | Performed by: INTERNAL MEDICINE

## 2023-11-29 PROCEDURE — 93296 REM INTERROG EVL PM/IDS: CPT

## 2023-12-05 LAB
MDC_IDC_LEAD_CONNECTION_STATUS: NORMAL
MDC_IDC_LEAD_IMPLANT_DT: NORMAL
MDC_IDC_LEAD_LOCATION: NORMAL
MDC_IDC_LEAD_LOCATION_DETAIL_1: NORMAL
MDC_IDC_LEAD_MFG: NORMAL
MDC_IDC_LEAD_MODEL: NORMAL
MDC_IDC_LEAD_POLARITY_TYPE: NORMAL
MDC_IDC_LEAD_SERIAL: NORMAL
MDC_IDC_LEAD_SPECIAL_FUNCTION: NORMAL
MDC_IDC_MSMT_BATTERY_DTM: NORMAL
MDC_IDC_MSMT_BATTERY_REMAINING_LONGEVITY: 73 MO
MDC_IDC_MSMT_BATTERY_RRT_TRIGGER: 2.73
MDC_IDC_MSMT_BATTERY_STATUS: NORMAL
MDC_IDC_MSMT_BATTERY_VOLTAGE: 2.99 V
MDC_IDC_MSMT_CAP_CHARGE_DTM: NORMAL
MDC_IDC_MSMT_CAP_CHARGE_ENERGY: 18 J
MDC_IDC_MSMT_CAP_CHARGE_TIME: 3.76
MDC_IDC_MSMT_CAP_CHARGE_TYPE: NORMAL
MDC_IDC_MSMT_LEADCHNL_RV_IMPEDANCE_VALUE: 266 OHM
MDC_IDC_MSMT_LEADCHNL_RV_IMPEDANCE_VALUE: 323 OHM
MDC_IDC_MSMT_LEADCHNL_RV_PACING_THRESHOLD_AMPLITUDE: 0.5 V
MDC_IDC_MSMT_LEADCHNL_RV_PACING_THRESHOLD_PULSEWIDTH: 0.4 MS
MDC_IDC_MSMT_LEADCHNL_RV_SENSING_INTR_AMPL: 7.75 MV
MDC_IDC_PG_IMPLANT_DTM: NORMAL
MDC_IDC_PG_MFG: NORMAL
MDC_IDC_PG_MODEL: NORMAL
MDC_IDC_PG_SERIAL: NORMAL
MDC_IDC_PG_TYPE: NORMAL
MDC_IDC_SESS_CLINIC_NAME: NORMAL
MDC_IDC_SESS_DTM: NORMAL
MDC_IDC_SESS_TYPE: NORMAL
MDC_IDC_SET_BRADY_HYSTRATE: NORMAL
MDC_IDC_SET_BRADY_LOWRATE: 40 {BEATS}/MIN
MDC_IDC_SET_BRADY_MODE: NORMAL
MDC_IDC_SET_LEADCHNL_RV_PACING_AMPLITUDE: 2 V
MDC_IDC_SET_LEADCHNL_RV_PACING_ANODE_ELECTRODE_1: NORMAL
MDC_IDC_SET_LEADCHNL_RV_PACING_ANODE_LOCATION_1: NORMAL
MDC_IDC_SET_LEADCHNL_RV_PACING_CAPTURE_MODE: NORMAL
MDC_IDC_SET_LEADCHNL_RV_PACING_CATHODE_ELECTRODE_1: NORMAL
MDC_IDC_SET_LEADCHNL_RV_PACING_CATHODE_LOCATION_1: NORMAL
MDC_IDC_SET_LEADCHNL_RV_PACING_POLARITY: NORMAL
MDC_IDC_SET_LEADCHNL_RV_PACING_PULSEWIDTH: 0.4 MS
MDC_IDC_SET_LEADCHNL_RV_SENSING_ANODE_ELECTRODE_1: NORMAL
MDC_IDC_SET_LEADCHNL_RV_SENSING_ANODE_LOCATION_1: NORMAL
MDC_IDC_SET_LEADCHNL_RV_SENSING_CATHODE_ELECTRODE_1: NORMAL
MDC_IDC_SET_LEADCHNL_RV_SENSING_CATHODE_LOCATION_1: NORMAL
MDC_IDC_SET_LEADCHNL_RV_SENSING_POLARITY: NORMAL
MDC_IDC_SET_LEADCHNL_RV_SENSING_SENSITIVITY: 0.3 MV
MDC_IDC_SET_ZONE_DETECTION_BEATS_DENOMINATOR: 16 {BEATS}
MDC_IDC_SET_ZONE_DETECTION_BEATS_DENOMINATOR: 32 {BEATS}
MDC_IDC_SET_ZONE_DETECTION_BEATS_DENOMINATOR: 40 {BEATS}
MDC_IDC_SET_ZONE_DETECTION_BEATS_NUMERATOR: 16 {BEATS}
MDC_IDC_SET_ZONE_DETECTION_BEATS_NUMERATOR: 30 {BEATS}
MDC_IDC_SET_ZONE_DETECTION_BEATS_NUMERATOR: 32 {BEATS}
MDC_IDC_SET_ZONE_DETECTION_INTERVAL: 300 MS
MDC_IDC_SET_ZONE_DETECTION_INTERVAL: 360 MS
MDC_IDC_SET_ZONE_DETECTION_INTERVAL: 390 MS
MDC_IDC_SET_ZONE_DETECTION_INTERVAL: NORMAL
MDC_IDC_SET_ZONE_STATUS: NORMAL
MDC_IDC_SET_ZONE_TYPE: NORMAL
MDC_IDC_SET_ZONE_VENDOR_TYPE: NORMAL
MDC_IDC_STAT_AT_BURDEN_PERCENT: 0 %
MDC_IDC_STAT_AT_DTM_END: NORMAL
MDC_IDC_STAT_AT_DTM_START: NORMAL
MDC_IDC_STAT_BRADY_DTM_END: NORMAL
MDC_IDC_STAT_BRADY_DTM_START: NORMAL
MDC_IDC_STAT_BRADY_RV_PERCENT_PACED: 0.01 %
MDC_IDC_STAT_EPISODE_RECENT_COUNT: 0
MDC_IDC_STAT_EPISODE_RECENT_COUNT_DTM_END: NORMAL
MDC_IDC_STAT_EPISODE_RECENT_COUNT_DTM_START: NORMAL
MDC_IDC_STAT_EPISODE_TOTAL_COUNT: 0
MDC_IDC_STAT_EPISODE_TOTAL_COUNT_DTM_END: NORMAL
MDC_IDC_STAT_EPISODE_TOTAL_COUNT_DTM_START: NORMAL
MDC_IDC_STAT_EPISODE_TYPE: NORMAL
MDC_IDC_STAT_TACHYTHERAPY_ATP_DELIVERED_RECENT: 0
MDC_IDC_STAT_TACHYTHERAPY_ATP_DELIVERED_TOTAL: 0
MDC_IDC_STAT_TACHYTHERAPY_RECENT_DTM_END: NORMAL
MDC_IDC_STAT_TACHYTHERAPY_RECENT_DTM_START: NORMAL
MDC_IDC_STAT_TACHYTHERAPY_SHOCKS_ABORTED_RECENT: 0
MDC_IDC_STAT_TACHYTHERAPY_SHOCKS_ABORTED_TOTAL: 0
MDC_IDC_STAT_TACHYTHERAPY_SHOCKS_DELIVERED_RECENT: 0
MDC_IDC_STAT_TACHYTHERAPY_SHOCKS_DELIVERED_TOTAL: 0
MDC_IDC_STAT_TACHYTHERAPY_TOTAL_DTM_END: NORMAL
MDC_IDC_STAT_TACHYTHERAPY_TOTAL_DTM_START: NORMAL

## 2023-12-13 ENCOUNTER — OFFICE VISIT (OUTPATIENT)
Dept: INTERNAL MEDICINE | Facility: CLINIC | Age: 58
End: 2023-12-13
Payer: COMMERCIAL

## 2023-12-13 ENCOUNTER — TELEPHONE (OUTPATIENT)
Dept: INTERNAL MEDICINE | Facility: CLINIC | Age: 58
End: 2023-12-13

## 2023-12-13 VITALS
TEMPERATURE: 97.7 F | WEIGHT: 137.2 LBS | BODY MASS INDEX: 25.25 KG/M2 | DIASTOLIC BLOOD PRESSURE: 76 MMHG | HEIGHT: 62 IN | OXYGEN SATURATION: 99 % | RESPIRATION RATE: 16 BRPM | HEART RATE: 74 BPM | SYSTOLIC BLOOD PRESSURE: 116 MMHG

## 2023-12-13 DIAGNOSIS — M79.645 PAIN IN FINGER OF BOTH HANDS: ICD-10-CM

## 2023-12-13 DIAGNOSIS — M79.644 PAIN IN FINGER OF BOTH HANDS: ICD-10-CM

## 2023-12-13 DIAGNOSIS — E11.9 TYPE 2 DIABETES MELLITUS WITHOUT COMPLICATION, WITHOUT LONG-TERM CURRENT USE OF INSULIN (H): ICD-10-CM

## 2023-12-13 DIAGNOSIS — I50.22 CHRONIC SYSTOLIC CONGESTIVE HEART FAILURE (H): ICD-10-CM

## 2023-12-13 DIAGNOSIS — Z00.00 ROUTINE HISTORY AND PHYSICAL EXAMINATION OF ADULT: Primary | ICD-10-CM

## 2023-12-13 DIAGNOSIS — R21 RASH: ICD-10-CM

## 2023-12-13 DIAGNOSIS — Z12.5 SCREENING FOR PROSTATE CANCER: ICD-10-CM

## 2023-12-13 DIAGNOSIS — J06.9 UPPER RESPIRATORY TRACT INFECTION, UNSPECIFIED TYPE: ICD-10-CM

## 2023-12-13 LAB
FLUAV RNA SPEC QL NAA+PROBE: NEGATIVE
FLUBV RNA RESP QL NAA+PROBE: NEGATIVE
HBA1C MFR BLD: 6.5 % (ref 0–5.6)
PSA SERPL DL<=0.01 NG/ML-MCNC: 0.56 NG/ML (ref 0–3.5)
RSV RNA SPEC NAA+PROBE: NEGATIVE
SARS-COV-2 RNA RESP QL NAA+PROBE: POSITIVE

## 2023-12-13 PROCEDURE — 83036 HEMOGLOBIN GLYCOSYLATED A1C: CPT | Performed by: INTERNAL MEDICINE

## 2023-12-13 PROCEDURE — 87637 SARSCOV2&INF A&B&RSV AMP PRB: CPT | Performed by: INTERNAL MEDICINE

## 2023-12-13 PROCEDURE — 36415 COLL VENOUS BLD VENIPUNCTURE: CPT | Performed by: INTERNAL MEDICINE

## 2023-12-13 PROCEDURE — 99396 PREV VISIT EST AGE 40-64: CPT | Performed by: INTERNAL MEDICINE

## 2023-12-13 PROCEDURE — 99214 OFFICE O/P EST MOD 30 MIN: CPT | Mod: 25 | Performed by: INTERNAL MEDICINE

## 2023-12-13 PROCEDURE — 99207 PR FOOT EXAM NO CHARGE: CPT | Mod: 25 | Performed by: INTERNAL MEDICINE

## 2023-12-13 PROCEDURE — G0103 PSA SCREENING: HCPCS | Performed by: INTERNAL MEDICINE

## 2023-12-13 RX ORDER — SACUBITRIL AND VALSARTAN 97; 103 MG/1; MG/1
1 TABLET, FILM COATED ORAL 2 TIMES DAILY
Qty: 180 TABLET | Refills: 3 | Status: SHIPPED | OUTPATIENT
Start: 2023-12-13 | End: 2024-09-24

## 2023-12-13 RX ORDER — TRIAMCINOLONE ACETONIDE 1 MG/G
CREAM TOPICAL 2 TIMES DAILY
Qty: 30 G | Refills: 0 | Status: SHIPPED | OUTPATIENT
Start: 2023-12-13 | End: 2024-04-15

## 2023-12-13 NOTE — TELEPHONE ENCOUNTER
Patient returned call he would like visit with Dr. Church    Telephone appt scheduled    Lucien Ryan RN

## 2023-12-13 NOTE — TELEPHONE ENCOUNTER
Called patient with Kazakh .    No answer. Left VM for patient to call back.    ---    COVID-19 testing came back as positive.    If he is interested in treatment, may refer to RN COVID-19 treatment line or double book with me anywhere for a telephone visit.

## 2023-12-13 NOTE — LETTER
December 13, 2023      Luke Henao  [address]  [facility] [address]        Dear ,    We are writing to inform you of your test results.    Your test results fall within the expected range(s) or remain unchanged from previous results.  Please continue with current treatment plan.    Let's plan on diabetes follow-up in 6 months please.     Resulted Orders   Hemoglobin A1c   Result Value Ref Range    Hemoglobin A1C 6.5 (H) 0.0 - 5.6 %   Prostate Specific Antigen Screen   Result Value Ref Range    Prostate Specific Antigen Screen 0.56 0.00 - 3.50 ng/mL     If you have any questions or concerns, please call the clinic at the number listed above.       Sincerely,      Shanna Church MD

## 2023-12-13 NOTE — PROGRESS NOTES
ASSESSMENT/PLAN                                                       (Z00.00) Routine history and physical examination of adult  (primary encounter diagnosis)  Comment: PMH, PSH, FH, SH, medications, allergies, immunizations, and preventative health measures reviewed and updated as appropriate.  Plan: see below for plans.      (E11.9) Type 2 diabetes mellitus without complication, without long-term current use of insulin (H)  Plan: hgbA1c today; recommendations to follow.     (I50.22) Chronic systolic congestive heart failure (H)  Comment: well-controlled on current regimen; followed by cardiology.  Plan: continue present management; refills provided.     (M79.645,  M79.644) Pain in finger of both hands  Plan: TRIAL of Voltaren gel as needed.    (R21) Rash  Comment: well-controlled on current regimen.    Plan: continue present management; refills provided.     (J06.9) Upper respiratory tract infection, unspecified type  Plan: COVID-19, RSV, and influenza testing today.    (Z12.5) Screening for prostate cancer  Plan: PSA today.  Shanna Church MD   [address] [address]  T: [phone], F: [phone]    YAMILE Henao is a very pleasant 58 year old male who presents for a physical.    Zimbabwean phone  utilized.    Cold symptoms for the last 3 days. Symptoms include a runny nose, sore throat, chills, dry cough, body aches, and headaches.    ROS:  Constitutional: no unintentional weight loss or gain reported; no fevers, chills, or sweats  reported    Cardiovascular: no chest pain, palpitations, or edema reported  Respiratory: no cough, wheezing, shortness of breath, or dyspnea on exertion reported  Gastrointestinal: no nausea, vomiting, constipation, diarrhea, or abdominal pain reported  Genitourinary: no urinary frequency, urgency, dysuria, or hematuria reported  Integumentary: no rash or pruritus  reported  Musculoskeletal: complains of finger pain  Neurologic: no focal weakness, numbness, or tingling reported  Hematologic: no easy bruising or bleeding reported  Endocrine: no heat or cold intolerance reported; no polyuria or polydipsia reported  Psychiatric: no anxiety or depression reported    Past Medical History:   Diagnosis Date    Benign essential hypertension     CAD (coronary artery disease) 2017    RCA STEMI s/p balloon angioplasty and multiple Sofie to RCA, LCx, and LAD    Chronic systolic congestive heart failure (H)     History of deep venous thrombosis 2017    left internal jugular (line-associated); left common femoral; was on coumadin    History of mitral valve repair 2017    History of stroke 2017    no residual deficits    ICD (implantable cardioverter-defibrillator) in place     Type 2 diabetes mellitus (H)      Past Surgical History:   Procedure Laterality Date    CATARACT EXTRACTION, BILATERAL  2021    COLONOSCOPY N/A 04/17/2017    Procedure: COLONOSCOPY;  Surgeon: Rashaad Bundy MD;  Location:  GI    CV RIGHT HEART CATH MEASUREMENTS RECORDED N/A 11/8/2023    Procedure: Heart Cath Right Heart Cath;  Surgeon: Jamison Swift MD;  Location:  HEART CARDIAC CATH LAB    INSERT INTRAAORTIC BALLOON PUMP Right 04/19/2017    Procedure: INSERT INTRAAORTIC BALLOON PUMP;  Right Subclavian Intra Aortic Balloon Pump Insertion using Maquet 40cc Ballon Catheter, Implentation of 8mm Gelweave Woven Vascular Prosthesis, Removal of Left Femoral Ballon Pump Catheter, Flouroscopy;  Surgeon: Keshav Leung MD;  Location:  OR    PERCUTANEOUS MITRAL VALVE REPAIR N/A 05/01/2017    Procedure: PERCUTANEOUS MITRAL VALVE REPAIR ANESTHESIA;  Mitraclip Procedure Possible Cardiopulmonary Bypass ;  Surgeon: Ron Cortez MD;  Location:  OR    SUBCLAVIAN AORTIC VALVE IMPLANT N/A 05/08/2017    Procedure: SUBCLAVIAN AORTIC VALVE IMPLANT;  Right Subclavian Graft Removal ;  Surgeon: Madhu  Keshav Cunningham MD;  Location:  OR    [facility] INSERT ELECTRD LEADS/REPOSTION  10/27/2017          Family History   Problem Relation Age of Onset    Hypertension Mother     Diabetes Type 2  Father     Hypertension Father     Myocardial Infarction No family hx of     Cerebrovascular Disease No family hx of     Coronary Artery Disease Early Onset No family hx of     Colon Cancer No family hx of     Prostate Cancer No family hx of      Social History     Occupational History    Occupation: Hearing [employer]   Tobacco Use    Smoking status: Former     Packs/day: 0.50     Years: 30.00     Additional pack years: 0.00     Total pack years: 15.00     Types: Cigarettes     Quit date: 2017     Years since quittin.9    Smokeless tobacco: Never   Vaping Use    Vaping Use: Never used   Substance and Sexual Activity    Alcohol use: No    Drug use: No    Sexual activity: Not Currently   Social History Narrative    . Remarried.    4 children with first marriage.    4 grandchildren.    No formal exercise.      No Known Allergies    Current Outpatient Medications   Medication Sig    aspirin (ASPIRIN LOW DOSE) 81 MG chewable tablet Take 1 tablet (81 mg) by mouth daily    atorvastatin (LIPITOR) 40 MG tablet TAKE 1 TABLET BY MOUTH EVERY DAY    bumetanide (BUMEX) 0.5 MG tablet Take 1 tablet (0.5 mg) by mouth daily    cetirizine (ZYRTEC) 10 MG tablet TAKE 1 TABLET (10 MG) BY MOUTH DAILY.    co-enzyme Q-10 100 MG CAPS capsule Take by mouth daily    dapagliflozin (FARXIGA) 10 MG TABS tablet TAKE 1 TABLET (10 MG) BY MOUTH DAILY.    diclofenac (VOLTAREN) 1 % topical gel Apply 2 g topically 4 times daily    JANUVIA 100 MG tablet TAKE 1 TABLET BY MOUTH EVERY DAY    metFORMIN (GLUCOPHAGE XR) 500 MG 24 hr tablet TAKE 2 TABLETS (1,000 MG) BY MOUTH DAILY (WITH DINNER)    metoprolol succinate ER (TOPROL XL) 100 MG 24 hr tablet Take 1 tablet (100 mg) by mouth daily    sacubitril-valsartan (ENTRESTO)  MG per tablet Take 1  "tablet by mouth 2 times daily    spironolactone (ALDACTONE) 25 MG tablet Take 0.5 tablets (12.5 mg) by mouth daily    triamcinolone (KENALOG) 0.1 % external cream Apply topically 2 times daily     Immunization History   Administered Date(s) Administered    COVID-19 Monovalent 18+ (Moderna) 05/04/2021, 06/01/2021, 12/29/2021    Influenza Vaccine 18-64 (Flublok) 10/12/2020, 10/10/2022    Influenza Vaccine, 6+MO IM (QUADRIVALENT W/PRESERVATIVES) 10/01/2018    Pneumococcal 23 valent 05/14/2017    TDAP Vaccine (Adacel) 09/26/2017     PREVENTATIVE HEALTH                                                      BMI: within normal limits   Blood pressure: well-controlled on current regimen   Prostate CA Screening: DUE  Colon CA screening: not sure if up to date   Lung CA screening: patient does not meet screening criteria  AAA screening: patient does not meet screening criteria  Screening cholesterol: n/a - already being treated for this condition  Screening diabetes: n/a - already being treated for this condition  STD testing: no risk factors present  Alcohol misuse screening: alcohol use reviewed - no intervention indicated at this time  Immunizations: reviewed;  Shingrix series, Prevnar 20, flu shot, and COVID-19 bosoter DUE - patient declines    OBJECTIVE                                                      /76   Pulse 74   Temp 97.7  F (36.5  C) (Tympanic)   Resp 16   Ht 1.575 m (5' 2\")   Wt 62.2 kg (137 lb 3.2 oz)   SpO2 99%   BMI 25.09 kg/m    Constitutional: well-appearing  Head, Ears, and Eyes: normocephalic; normal external auditory canal and pinna; tympanic membranes visualized and normal; normal lids and conjunctivae  Neck: supple, symmetric, no thyromegaly or lymphadenopathy  Respiratory: normal respiratory effort; clear to auscultation bilaterally  Cardiovascular: regular rate and rhythm; no edema  Gastrointestinal: soft, non-tender, and non-distended; no organomegaly or masses  Musculoskeletal: " normal gait and station  Foot exam: feet intact - no lesions or ulcers; sensation intact to monofilament  Psych: normal judgment and insight; normal mood and affect; recent and remote memory intact    ---    (Note was completed, in part, with [employer] voice-recognition software. Documentation was reviewed, but some grammatical, spelling, and word errors may remain.)

## 2023-12-14 ENCOUNTER — VIRTUAL VISIT (OUTPATIENT)
Dept: INTERNAL MEDICINE | Facility: CLINIC | Age: 58
End: 2023-12-14
Payer: COMMERCIAL

## 2023-12-14 DIAGNOSIS — U07.1 INFECTION DUE TO 2019 NOVEL CORONAVIRUS: Primary | ICD-10-CM

## 2023-12-14 DIAGNOSIS — E11.9 TYPE 2 DIABETES MELLITUS WITHOUT COMPLICATION, WITHOUT LONG-TERM CURRENT USE OF INSULIN (H): ICD-10-CM

## 2023-12-14 DIAGNOSIS — I25.10 CORONARY ARTERY DISEASE INVOLVING NATIVE CORONARY ARTERY OF NATIVE HEART WITHOUT ANGINA PECTORIS: ICD-10-CM

## 2023-12-14 DIAGNOSIS — I50.22 CHRONIC SYSTOLIC CONGESTIVE HEART FAILURE (H): ICD-10-CM

## 2023-12-14 PROCEDURE — 99441 PR PHYSICIAN TELEPHONE EVALUATION 5-10 MIN: CPT | Mod: 93 | Performed by: INTERNAL MEDICINE

## 2023-12-14 NOTE — PROGRESS NOTES
TELEPHONE VISIT                                                      ASSESSMENT/PLAN                                                      (U07.1) Infection due to 2019 novel coronavirus  (primary encounter diagnosis)  (E11.9) Type 2 diabetes mellitus without complication, without long-term current use of insulin (H)  (I50.22) Chronic systolic congestive heart failure (H)  (I25.10) Coronary artery disease involving native coronary artery of native heart without angina pectoris  Comment: patient is a good candidate for oral anti-viral therapy.  Plan: Paxlovid prescribed - patient to use as directed; potential side effects discussed with and accepted by patient; HOLD atorvastatin while taking Paxlovid; may use OTC cough/cold medications and Tylenol/NSAIDs as needed for supportive care; encouraged to rest and stay well-hydrated; isolation precautions reviewed with patient; if symptoms worsen, change, or do not improve, patient to contact MD.      Total time of call between patient and provider was 5 minutes. Provider location: office. Patient location: home.    Shanna Chucrh MD   [address] [address]  T: [phone], F: [phone]    YAMILE Henao is a very pleasant 58 year old male who requested a telephone visit to discuss treatment for COVID-19:    Patient presented to his physical yesterday with upper respiratory symptoms including runny nose, sore throat, chills, dry cough, body aches, and headaches.  Symptoms started several days ago.    PMH significant for type 2 diabetes, chronic systolic CHF, and CAD.  Normal kidney function.    No recent COVID-19 boosters (boosters declined by patient).    ---    (Note was completed, in part, with [employer] voice-recognition software. Documentation reviewed, but some grammatical, spelling, and word errors may remain.)

## 2024-01-18 ENCOUNTER — CARE COORDINATION (OUTPATIENT)
Dept: CARDIOLOGY | Facility: CLINIC | Age: 59
End: 2024-01-18
Payer: COMMERCIAL

## 2024-01-18 DIAGNOSIS — I50.22 CHRONIC SYSTOLIC CONGESTIVE HEART FAILURE (H): Primary | ICD-10-CM

## 2024-01-23 ENCOUNTER — LAB (OUTPATIENT)
Dept: LAB | Facility: CLINIC | Age: 59
End: 2024-01-23
Attending: INTERNAL MEDICINE
Payer: COMMERCIAL

## 2024-01-23 ENCOUNTER — OFFICE VISIT (OUTPATIENT)
Dept: CARDIOLOGY | Facility: CLINIC | Age: 59
End: 2024-01-23
Attending: INTERNAL MEDICINE
Payer: COMMERCIAL

## 2024-01-23 VITALS
DIASTOLIC BLOOD PRESSURE: 94 MMHG | BODY MASS INDEX: 24.75 KG/M2 | HEART RATE: 83 BPM | WEIGHT: 135.3 LBS | OXYGEN SATURATION: 97 % | SYSTOLIC BLOOD PRESSURE: 145 MMHG

## 2024-01-23 DIAGNOSIS — Z98.890 HISTORY OF MITRAL VALVE REPAIR: ICD-10-CM

## 2024-01-23 DIAGNOSIS — E78.2 MIXED HYPERLIPIDEMIA: ICD-10-CM

## 2024-01-23 DIAGNOSIS — I50.22 CHRONIC SYSTOLIC CONGESTIVE HEART FAILURE (H): ICD-10-CM

## 2024-01-23 DIAGNOSIS — T46.6X5A MYALGIA DUE TO STATIN: ICD-10-CM

## 2024-01-23 DIAGNOSIS — I10 BENIGN ESSENTIAL HYPERTENSION: ICD-10-CM

## 2024-01-23 DIAGNOSIS — I25.5 ISCHEMIC CARDIOMYOPATHY: ICD-10-CM

## 2024-01-23 DIAGNOSIS — M79.10 MYALGIA DUE TO STATIN: ICD-10-CM

## 2024-01-23 DIAGNOSIS — I51.89 OTHER ILL-DEFINED HEART DISEASES: Primary | ICD-10-CM

## 2024-01-23 LAB
ALBUMIN SERPL BCG-MCNC: 4.2 G/DL (ref 3.5–5.2)
ALBUMIN SERPL BCG-MCNC: 4.2 G/DL (ref 3.5–5.2)
ALP SERPL-CCNC: 65 U/L (ref 40–150)
ALP SERPL-CCNC: 65 U/L (ref 40–150)
ALT SERPL W P-5'-P-CCNC: 32 U/L (ref 0–70)
ALT SERPL W P-5'-P-CCNC: 33 U/L (ref 0–70)
ANION GAP SERPL CALCULATED.3IONS-SCNC: 10 MMOL/L (ref 7–15)
AST SERPL W P-5'-P-CCNC: 36 U/L (ref 0–45)
AST SERPL W P-5'-P-CCNC: 36 U/L (ref 0–45)
BILIRUB DIRECT SERPL-MCNC: 0.37 MG/DL (ref 0–0.3)
BILIRUB SERPL-MCNC: 1.1 MG/DL
BILIRUB SERPL-MCNC: 1.1 MG/DL
BUN SERPL-MCNC: 25.9 MG/DL (ref 6–20)
CALCIUM SERPL-MCNC: 9.2 MG/DL (ref 8.6–10)
CHLORIDE SERPL-SCNC: 104 MMOL/L (ref 98–107)
CREAT SERPL-MCNC: 1.4 MG/DL (ref 0.67–1.17)
DEPRECATED HCO3 PLAS-SCNC: 27 MMOL/L (ref 22–29)
EGFRCR SERPLBLD CKD-EPI 2021: 59 ML/MIN/1.73M2
ERYTHROCYTE [DISTWIDTH] IN BLOOD BY AUTOMATED COUNT: 14.4 % (ref 10–15)
GLUCOSE SERPL-MCNC: 188 MG/DL (ref 70–99)
HCT VFR BLD AUTO: 55.9 % (ref 40–53)
HGB BLD-MCNC: 17.7 G/DL (ref 13.3–17.7)
MCH RBC QN AUTO: 32.1 PG (ref 26.5–33)
MCHC RBC AUTO-ENTMCNC: 31.7 G/DL (ref 31.5–36.5)
MCV RBC AUTO: 101 FL (ref 78–100)
NT-PROBNP SERPL-MCNC: 2113 PG/ML (ref 0–900)
PLATELET # BLD AUTO: 202 10E3/UL (ref 150–450)
POTASSIUM SERPL-SCNC: 4.9 MMOL/L (ref 3.4–5.3)
PROT SERPL-MCNC: 7.4 G/DL (ref 6.4–8.3)
PROT SERPL-MCNC: 7.6 G/DL (ref 6.4–8.3)
RBC # BLD AUTO: 5.52 10E6/UL (ref 4.4–5.9)
SODIUM SERPL-SCNC: 141 MMOL/L (ref 135–145)
WBC # BLD AUTO: 10.8 10E3/UL (ref 4–11)

## 2024-01-23 PROCEDURE — 36415 COLL VENOUS BLD VENIPUNCTURE: CPT | Performed by: PATHOLOGY

## 2024-01-23 PROCEDURE — 85027 COMPLETE CBC AUTOMATED: CPT | Performed by: PATHOLOGY

## 2024-01-23 PROCEDURE — 82248 BILIRUBIN DIRECT: CPT | Performed by: PATHOLOGY

## 2024-01-23 PROCEDURE — 99215 OFFICE O/P EST HI 40 MIN: CPT | Performed by: INTERNAL MEDICINE

## 2024-01-23 PROCEDURE — 83880 ASSAY OF NATRIURETIC PEPTIDE: CPT | Performed by: PATHOLOGY

## 2024-01-23 PROCEDURE — 80053 COMPREHEN METABOLIC PANEL: CPT | Performed by: PATHOLOGY

## 2024-01-23 PROCEDURE — 99213 OFFICE O/P EST LOW 20 MIN: CPT | Performed by: INTERNAL MEDICINE

## 2024-01-23 RX ORDER — LIDOCAINE 40 MG/G
CREAM TOPICAL
Status: CANCELLED | OUTPATIENT
Start: 2024-01-23

## 2024-01-23 RX ORDER — METOPROLOL SUCCINATE 50 MG/1
50 TABLET, EXTENDED RELEASE ORAL DAILY
Qty: 90 TABLET | Refills: 3 | Status: SHIPPED | OUTPATIENT
Start: 2024-01-23 | End: 2024-09-24

## 2024-01-23 ASSESSMENT — PAIN SCALES - GENERAL: PAINLEVEL: NO PAIN (0)

## 2024-01-23 NOTE — NURSING NOTE
Chief Complaint   Patient presents with    Follow Up     1/23/24--Dr. Collier: Chronic systolic congestive heart failure (H)       Vitals were taken and medications reconciled.    Kofi Lipscomb, EMT  8:22 AM

## 2024-01-23 NOTE — PATIENT INSTRUCTIONS
Dr. Collier recommends:    Please decrease Metoprolol Succinate/Toprol XL to 50 MG daily.    Right heart catheterization on Wednesday, 1/31/24. Here are the instructions for the procedure.  Pre-procedure instructions - Right heart catheterization  Patient Education    Your arrival time is 12:00 on 1/31/24.  Location is [address] Waiting Room  Please plan on being at the hospital all day.  At any time, emergencies and/or urgent cases may come up which could delay the start of your procedure.    Pre-procedure instructions - Right heart catheterization  No solid food for 8 hours prior  Nothing to drink 2 hours prior to arrival time  You can take your morning medications (except diabetic and blood thinners) with sips of water  We recommend you arrange for a ride to drop you off and pick you up, in the instance, you are unable to drive home, however you should be able to function as you normally would after the procedure    Diabetic Medication Instructions  Typical instructions for insulin diabetic medication holding are below. However, please reach out to your Primary Care Provider or Endocrinologist for specific instructions  DO NOT take any oral diabetic medication, short-acting diabetes medications/insulin, humalog or regular insulin the morning of your test  Take   dose of long-acting insulin (Lantus, Levemir) the day of your test  Remember to  bring your glucometer and insulin with you to take after your test if needed  DO NOT take injectable GLP-1 agonists semaglutide (Ozempic, Wegovy), dulaglutide (Trulicity), exenatide ER (Bydureon), tirzepatide (Mounjaro), or oral semaglutide (Rybelsus) for 7 days prior your procedure  Hold once daily injectable GLP-1 agonists exenatide (Byetta), liraglutide (Saxenda, Victoza), lixisenatide (Soliqua) the day before and day of your procedure                Anticoagulation Medication  Instructions   NA    You will need to follow up with one of our cardiology APPs 1-2 weeks after your procedure. If you need help scheduling or rescheduling your appointment, please call [phone]     Follow up clinic visit in 6 months with labs the same day.    Thank you for your visit today.  Please call me with any questions or concerns.   Glenn Sun RN  Cardiology Care Coordinator  [phone]

## 2024-01-23 NOTE — PROGRESS NOTES
January 23rd, 2024     I had the pleasure of seeing Luke Henao  in the [facility] Failure [facility]. As you know patient has a complex medical history including ICM c/b RCA STEMI (s/p POBA RCA, FEMI RCA x7, Lcx. LAD 3/2017) c/b cardiogenic shock s/p IABP, MR s/p mitral clip, HTN, DVT on AC, bilateral hemispheric strokes. He presents to Advanced Heart Failure Clinic for follow-up.     The patient has been followed in [facility] for a number of years, last seen on 10/23. He was initially transferred to [facility] on 3/27/2017 with cardiogenic shock after an inferior STEMI. Patient had received a POBA to his RCA at that time, as he was considered for CABG. Initial TTE with LVEF of 30-35%. Underwent coronary angiogram with a FEMI to his RCA, LAD, and Lcx. Further hospital course was complicated by cardiogenic shock c/b IABP. He underwent a Mitraclip for ischemic MR. Underwent ICD implant on 10/2017. His GDMT was titrated over the course of the following years. He had improvement in symptoms, though his LVEF continued to be reduced. Most recently 10-20%.        As mentioned we last saw him on 10/2023. At the time he had noted he has some difficulty doing his job and develops lower extremity edema by end of the day. He had a RHC on 11/2023 which results are demonstrated below. Metoprolol was recently decreased from 200mg daily to 100mg  Notes orthopnea, PND, worsening RAYMOND. Has to sit up to sleep. Only 50 feet and gets tired. 1.5 months- 2months. Lower extremity swelling that improves with his bumex.   Some times has mild chest pain. Unrelated to activity and is dull pain. He feels more cold.     Works with hearing aid.   Never miss medications, works well  Lives with wife, house. 20 years remarried, has 4 children with ex-wife in [place]. He moved to [place] 20 years ago. Quit cigarette 7 years ago.     PAST MEDICAL HISTORY:  Past Medical History:   Diagnosis Date    Benign essential hypertension     CAD (coronary  artery disease) 2017    RCA STEMI s/p balloon angioplasty and multiple Sofie to RCA, LCx, and LAD    Chronic systolic congestive heart failure (H)     History of deep venous thrombosis 2017    left internal jugular (line-associated); left common femoral; was on coumadin    History of mitral valve repair 2017    History of stroke 2017    no residual deficits    ICD (implantable cardioverter-defibrillator) in place     Type 2 diabetes mellitus (H)      FAMILY HISTORY:  Family History   Problem Relation Age of Onset    Hypertension Mother     Diabetes Type 2  Father     Hypertension Father     Myocardial Infarction No family hx of     Cerebrovascular Disease No family hx of     Coronary Artery Disease Early Onset No family hx of     Colon Cancer No family hx of     Prostate Cancer No family hx of      SOCIAL HISTORY:  Social History     Socioeconomic History    Marital status:    Occupational History    Occupation: [employer]   Tobacco Use    Smoking status: Former     Packs/day: 0.50     Years: 30.00     Additional pack years: 0.00     Total pack years: 15.00     Types: Cigarettes     Quit date: 2017     Years since quittin.8    Smokeless tobacco: Never   Vaping Use    Vaping Use: Never used   Substance and Sexual Activity    Alcohol use: No    Drug use: No    Sexual activity: Not Currently   Social History Narrative    . Remarried.    4 children with first marriage.    3 grand children.    No formal exercise.      CURRENT MEDICATIONS:  Current Outpatient Medications   Medication    aspirin (ASPIRIN LOW DOSE) 81 MG chewable tablet    atorvastatin (LIPITOR) 40 MG tablet    bumetanide (BUMEX) 0.5 MG tablet    cetirizine (ZYRTEC) 10 MG tablet    co-enzyme Q-10 100 MG CAPS capsule    dapagliflozin (FARXIGA) 10 MG TABS tablet    diclofenac (VOLTAREN) 1 % topical gel    JANUVIA 100 MG tablet    metFORMIN (GLUCOPHAGE XR) 500 MG 24 hr tablet    metoprolol succinate ER (TOPROL XL) 100 MG 24 hr  tablet    sacubitril-valsartan (ENTRESTO)  MG per tablet    spironolactone (ALDACTONE) 25 MG tablet    triamcinolone (KENALOG) 0.1 % external cream     No current facility-administered medications for this visit.     ROS:   Constitutional: No fever, chills, or sweats. Weight is 0 lbs 0 oz  ENT: No visual disturbance, ear ache, epistaxis, sore throat.   Allergies/Immunologic: Negative.   Respiratory: No cough, hemoptysis.   Cardiovascular: As per HPI.   GI: No nausea, vomiting, hematemesis, melena, or hematochezia.   : No urinary frequency, dysuria, or hematuria.   Integument: Negative.   Psychiatric: Pleasant, no major depression noted  Neuro: No focal neurological deficits noted  Endocrinology: Negative.   Musculoskeletal: As per HPI.      EXAM:  BP (!) 145/94 (BP Location: Left arm, Patient Position: Chair, Cuff Size: Adult Regular)   Pulse 83   Wt 61.4 kg (135 lb 4.8 oz)   SpO2 97%   BMI 24.75 kg/m    General: appears comfortable, alert and oriented  Head: normocephalic, atraumatic  Eyes: anicteric sclera, EOMI , PERRL  Neck: no adenopathy  Orophyarynx: moist mucosa, no lesions noted  Heart: regular, S1/S2, no murmurs, rubs or gallop. JVP at 12  Lungs: CTAB, No wheezing.   Abdomen: soft, non-tender, slightly distended, bowel sounds present, no hepatosplenomegaly  Extremities: Trace BLE edema  Skin: Cool to ouch  Neuro: grossly non-focal     Labs:  Lab Results   Component Value Date    WBC 11.4 (H) 11/08/2023    HGB 16.6 11/08/2023    HCT 49.2 11/08/2023     11/08/2023     11/08/2023    POTASSIUM 5.5 (H) 11/08/2023    CHLORIDE 104 11/08/2023    CO2 22 11/08/2023    BUN 23.1 (H) 11/08/2023    CR 1.22 (H) 11/08/2023     (H) 11/08/2023    SED 40 (H) 06/19/2017    NTBNPI 9,984 (H) 04/08/2017    NTBNP 1,515 (H) 11/08/2023    TROPI 0.267 (HH) 04/26/2017    AST 29 11/08/2023    ALT 22 11/08/2023    ALKPHOS 64 11/08/2023    BILITOTAL 0.8 11/08/2023    INR 1.01 11/08/2023     TTE  5/2023:  Left ventricular function is decreased. The ejection fraction is 10-20% (severely reduced).  Post MitraClip placement in 2017. Mild mitral insufficiency is present.  Global right ventricular function is mildly to moderately reduced.  Moderate tricuspid insufficiency is present. Moderate pulmonary hypertension is present.  IVC diameter <2.1 cm collapsing >50% with sniff suggests a normal RA pressure of 3 mmHg.  No pericardial effusion is present. No significant changes noted.     Device Check 5/24/23:  Device: Medtronic [id number] Visia AF MRI VR  Normal device function.  Mode: VVI 40 bpm  : <0.1%  Intrinsic rhythm: VS 68 bpm  Thoracic Impedance:  Near reference line.   Short V-V intervals: 0  Lead Trends Appear Stable.  Estimated battery longevity to RRT = 6.9 years. Battery voltage = 2.99V.   Atrial Arrhythmia: None  AF [place]: 0%  Anticoagulant: None  Ventricular Arrhythmia: None  Setting Changes: Last shock vector changed to B>AX from AX>B  Patient has an appointment to see Shyanne VENEGAS today.   Plan: Device follow-up every 3 months.     Right Heart catheterization on 11/2023:  Baseline hemodynamics  NIBP 147/87/111  HR 80  Hgb 16.6   RA 7/11/7  RV 69/-/11  PA 68/26/40  PCWP 20/26/20  PA sat 62%  Marvin 2.8/1.7  TD 2.8/1.7  PVR 7.1  After nipride infusion  NIBP 107/58/11  HR 80  CVP 2  PA 45/19/26  PCWP 10/12/10  PA sat 71%  Marvin 3.4/2.1  TD 3/1.8  PVR 4.8    ASSESSMENT AND PLAN:  In summary, patient is a 58 year old with a pmhx of ICM c/b RCA STEMI (s/p POBA RCA, FEMI RCA x7, Lcx. LAD 3/2017) c/b cardiogenic shock s/p IABP, MR s/p mitral clip, HTN, DVT on AC, bilateral hemispheric strokes.  He has not been doing well the past 1 month and a half. He has been feeling cold, dyspnea on minimal exertion, orthopnea and PND. His creatinine is elevated, we added LFTs as an add-on to morning labs. His most recent CI was 1.7 from [facility] in November. For now we will reduce Toprol to 50mg daily and RHC tomorrow with  possible admission based on number for concern for possible ambulatory shock.  He is cool to the touch again even though his blood pressure is elevated he is feeling horrible and has lower extremity edema again I am very concerned about worsening cardiogenic shock in the setting of endorgan dysfunction.  As such we decided to urgently proceed with right heart catheterization that could be done next week.  I suspect he will need admission with potentially leaving [facility] given how he looks today.  I did offer right heart catheterization and hemodynamic elevation tomorrow however he is unable to take today okay to wear.  All risk-benefit understood however he is just not able to make it tomorrow.  This is understandable.  Will evaluate future plans after hemodynamics established.    - Chronic systolic heart failure/HFrEF (EF 10-20%) secondary to ischemic cardiomyopathy  - NYHA Symptom Class III-IV,  Stage C/D worsening  - ACE-I/ARB/ARNi: on Entresto to 97 mg twice daily  - BB Metoprolol succinate recently reduced from 200mg to 100mg, decrease to 50mg daily today given concerns for CS  - Aldosterone antagonist continue daily spironolactone (Will consider increasing 25mg daily, however we will hold off for now given PEDRO)  - SGLT2i: Farxiga 10 mg daily  - SCD prophylaxis ICD in place  - Fluid status Slight hypervolemia  - Diuretic: bumex 0.5 mg daily, pending RHC this might need to be increased  - Cardiac Rehab: declined  - Sleep Apnea Evaluation: Not indicated  - Remote monitoring: Encouraged to utilize MyChart, coaching offerred  -Advanced therapies: He has quit smoking since 7 years, no recreational drug use.     I appreciate the opportunity to participate in the care of Luke Henao . Please do not hesitate to contact me with any further questions.  Patient was seen with Dr. Nando Lew.   Sincerely,   Ron Heath MD  [facility] Division of Cardiology     I have seen and evaluated the patient and agree  with the assessment and plan as documented above.  I have spent a total of 45 minutes reviewing patient's medical history, face-to-face time with patient and documenting in epic as well as arranging for urgent follow-ups including the cardiac catheterization laboratory.  Please note that a professional  over the phone was utilized for Most of the communication.  Vipin Collier MD

## 2024-01-23 NOTE — LETTER
1/23/2024      RE: Luke Henao  [address]  [facility] [address]       Dear Colleague,    Thank you for the opportunity to participate in the care of your patient, Luke Henao, at the [facility] [place] [facility]. Please see a copy of my visit note below.    January 23rd, 2024     I had the pleasure of seeing Luke Henao  in the [facility]. As you know patient has a complex medical history including ICM c/b RCA STEMI (s/p POBA RCA, FEMI RCA x7, Lcx. LAD 3/2017) c/b cardiogenic shock s/p IABP, MR s/p mitral clip, HTN, DVT on AC, bilateral hemispheric strokes. He presents to Advanced Heart Failure Clinic for follow-up.     The patient has been followed in [facility] for a number of years, last seen on 10/23. He was initially transferred to [facility] on 3/27/2017 with cardiogenic shock after an inferior STEMI. Patient had received a POBA to his RCA at that time, as he was considered for CABG. Initial TTE with LVEF of 30-35%. Underwent coronary angiogram with a FEMI to his RCA, LAD, and Lcx. Further hospital course was complicated by cardiogenic shock c/b IABP. He underwent a Mitraclip for ischemic MR. Underwent ICD implant on 10/2017. His GDMT was titrated over the course of the following years. He had improvement in symptoms, though his LVEF continued to be reduced. Most recently 10-20%.        As mentioned we last saw him on 10/2023. At the time he had noted he has some difficulty doing his job and develops lower extremity edema by end of the day. He had a RHC on 11/2023 which results are demonstrated below. Metoprolol was recently decreased from 200mg daily to 100mg  Notes orthopnea, PND, worsening RAYMOND. Has to sit up to sleep. Only 50 feet and gets tired. 1.5 months- 2months. Lower extremity swelling that improves with his bumex.   Some times has mild chest pain. Unrelated to activity and is dull pain. He feels more cold.      Works with hearing aid.   Never miss medications, works well  Lives with wife, house. 20 years remarried, has 4 children with ex-wife in [place]. He moved to [place] 20 years ago. Quit cigarette 7 years ago.     PAST MEDICAL HISTORY:  Past Medical History:   Diagnosis Date    Benign essential hypertension     CAD (coronary artery disease) 2017    RCA STEMI s/p balloon angioplasty and multiple Sofie to RCA, LCx, and LAD    Chronic systolic congestive heart failure (H)     History of deep venous thrombosis 2017    left internal jugular (line-associated); left common femoral; was on coumadin    History of mitral valve repair 2017    History of stroke 2017    no residual deficits    ICD (implantable cardioverter-defibrillator) in place     Type 2 diabetes mellitus (H)      FAMILY HISTORY:  Family History   Problem Relation Age of Onset    Hypertension Mother     Diabetes Type 2  Father     Hypertension Father     Myocardial Infarction No family hx of     Cerebrovascular Disease No family hx of     Coronary Artery Disease Early Onset No family hx of     Colon Cancer No family hx of     Prostate Cancer No family hx of      SOCIAL HISTORY:  Social History     Socioeconomic History    Marital status:    Occupational History    Occupation: [employer]   Tobacco Use    Smoking status: Former     Packs/day: 0.50     Years: 30.00     Additional pack years: 0.00     Total pack years: 15.00     Types: Cigarettes     Quit date: 2017     Years since quittin.8    Smokeless tobacco: Never   Vaping Use    Vaping Use: Never used   Substance and Sexual Activity    Alcohol use: No    Drug use: No    Sexual activity: Not Currently   Social History Narrative    . Remarried.    4 children with first marriage.    3 grand children.    No formal exercise.      CURRENT MEDICATIONS:  Current Outpatient Medications   Medication    aspirin (ASPIRIN LOW DOSE) 81 MG chewable tablet    atorvastatin (LIPITOR)  40 MG tablet    bumetanide (BUMEX) 0.5 MG tablet    cetirizine (ZYRTEC) 10 MG tablet    co-enzyme Q-10 100 MG CAPS capsule    dapagliflozin (FARXIGA) 10 MG TABS tablet    diclofenac (VOLTAREN) 1 % topical gel    JANUVIA 100 MG tablet    metFORMIN (GLUCOPHAGE XR) 500 MG 24 hr tablet    metoprolol succinate ER (TOPROL XL) 100 MG 24 hr tablet    sacubitril-valsartan (ENTRESTO)  MG per tablet    spironolactone (ALDACTONE) 25 MG tablet    triamcinolone (KENALOG) 0.1 % external cream     No current facility-administered medications for this visit.     ROS:   Constitutional: No fever, chills, or sweats. Weight is 0 lbs 0 oz  ENT: No visual disturbance, ear ache, epistaxis, sore throat.   Allergies/Immunologic: Negative.   Respiratory: No cough, hemoptysis.   Cardiovascular: As per HPI.   GI: No nausea, vomiting, hematemesis, melena, or hematochezia.   : No urinary frequency, dysuria, or hematuria.   Integument: Negative.   Psychiatric: Pleasant, no major depression noted  Neuro: No focal neurological deficits noted  Endocrinology: Negative.   Musculoskeletal: As per HPI.      EXAM:  BP (!) 145/94 (BP Location: Left arm, Patient Position: Chair, Cuff Size: Adult Regular)   Pulse 83   Wt 61.4 kg (135 lb 4.8 oz)   SpO2 97%   BMI 24.75 kg/m    General: appears comfortable, alert and oriented  Head: normocephalic, atraumatic  Eyes: anicteric sclera, EOMI , PERRL  Neck: no adenopathy  Orophyarynx: moist mucosa, no lesions noted  Heart: regular, S1/S2, no murmurs, rubs or gallop. JVP at 12  Lungs: CTAB, No wheezing.   Abdomen: soft, non-tender, slightly distended, bowel sounds present, no hepatosplenomegaly  Extremities: Trace BLE edema  Skin: Cool to ouch  Neuro: grossly non-focal     Labs:  Lab Results   Component Value Date    WBC 11.4 (H) 11/08/2023    HGB 16.6 11/08/2023    HCT 49.2 11/08/2023     11/08/2023     11/08/2023    POTASSIUM 5.5 (H) 11/08/2023    CHLORIDE 104 11/08/2023    CO2 22  11/08/2023    BUN 23.1 (H) 11/08/2023    CR 1.22 (H) 11/08/2023     (H) 11/08/2023    SED 40 (H) 06/19/2017    NTBNPI 9,984 (H) 04/08/2017    NTBNP 1,515 (H) 11/08/2023    TROPI 0.267 (HH) 04/26/2017    AST 29 11/08/2023    ALT 22 11/08/2023    ALKPHOS 64 11/08/2023    BILITOTAL 0.8 11/08/2023    INR 1.01 11/08/2023     TTE 5/2023:  Left ventricular function is decreased. The ejection fraction is 10-20% (severely reduced).  Post MitraClip placement in 2017. Mild mitral insufficiency is present.  Global right ventricular function is mildly to moderately reduced.  Moderate tricuspid insufficiency is present. Moderate pulmonary hypertension is present.  IVC diameter <2.1 cm collapsing >50% with sniff suggests a normal RA pressure of 3 mmHg.  No pericardial effusion is present. No significant changes noted.     Device Check 5/24/23:  Device: [employer] [id number] Visia AF MRI VR  Normal device function.  Mode: VVI 40 bpm  : <0.1%  Intrinsic rhythm: VS 68 bpm  Thoracic Impedance:  Near reference line.   Short V-V intervals: 0  Lead Trends Appear Stable.  Estimated battery longevity to RRT = 6.9 years. Battery voltage = 2.99V.   Atrial Arrhythmia: None  AF [place]: 0%  Anticoagulant: None  Ventricular Arrhythmia: None  Setting Changes: Last shock vector changed to B>AX from AX>B  Patient has an appointment to see Shyanne VENEGAS today.   Plan: Device follow-up every 3 months.     Right Heart catheterization on 11/2023:  Baseline hemodynamics  NIBP 147/87/111  HR 80  Hgb 16.6   RA 7/11/7  RV 69/-/11  PA 68/26/40  PCWP 20/26/20  PA sat 62%  Marvin 2.8/1.7  TD 2.8/1.7  PVR 7.1  After nipride infusion  NIBP 107/58/11  HR 80  CVP 2  PA 45/19/26  PCWP 10/12/10  PA sat 71%  Marvin 3.4/2.1  TD 3/1.8  PVR 4.8    ASSESSMENT AND PLAN:  In summary, patient is a 58 year old with a pmhx of ICM c/b RCA STEMI (s/p POBA RCA, FEMI RCA x7, Lcx. LAD 3/2017) c/b cardiogenic shock s/p IABP, MR s/p mitral clip, HTN, DVT on AC, bilateral  hemispheric strokes.  He has not been doing well the past 1 month and a half. He has been feeling cold, dyspnea on minimal exertion, orthopnea and PND. His creatinine is elevated, we added LFTs as an add-on to morning labs. His most recent CI was 1.7 from [facility] in November. For now we will reduce Toprol to 50mg daily and RHC tomorrow with possible admission based on number for concern for possible ambulatory shock.  He is cool to the touch again even though his blood pressure is elevated he is feeling horrible and has lower extremity edema again I am very concerned about worsening cardiogenic shock in the setting of endorgan dysfunction.  As such we decided to urgently proceed with right heart catheterization that could be done next week.  I suspect he will need admission with potentially leaving [facility] given how he looks today.  I did offer right heart catheterization and hemodynamic elevation tomorrow however he is unable to take today okay to wear.  All risk-benefit understood however he is just not able to make it tomorrow.  This is understandable.  Will evaluate future plans after hemodynamics established.    - Chronic systolic heart failure/HFrEF (EF 10-20%) secondary to ischemic cardiomyopathy  - NYHA Symptom Class III-IV,  Stage C/D worsening  - ACE-I/ARB/ARNi: on Entresto to 97 mg twice daily  - BB Metoprolol succinate recently reduced from 200mg to 100mg, decrease to 50mg daily today given concerns for CS  - Aldosterone antagonist continue daily spironolactone (Will consider increasing 25mg daily, however we will hold off for now given PEDRO)  - SGLT2i: Farxiga 10 mg daily  - SCD prophylaxis ICD in place  - Fluid status Slight hypervolemia  - Diuretic: bumex 0.5 mg daily, pending RHC this might need to be increased  - Cardiac Rehab: declined  - Sleep Apnea Evaluation: Not indicated  - Remote monitoring: Encouraged to utilize MyChart, coaching offerred  -Advanced therapies: He has quit smoking since 7 years, no  recreational drug use.     I appreciate the opportunity to participate in the care of Luke Henao . Please do not hesitate to contact me with any further questions.  Patient was seen with Dr. Nando Lew.   Sincerely,   Ron Heath MD  [facility] Division of Cardiology     I have seen and evaluated the patient and agree with the assessment and plan as documented above.  I have spent a total of 45 minutes reviewing patient's medical history, face-to-face time with patient and documenting in epic as well as arranging for urgent follow-ups including the cardiac catheterization laboratory.  Please note that a professional  over the phone was utilized for Most of the communication.  Vipin Collier MD

## 2024-02-09 ENCOUNTER — TELEPHONE (OUTPATIENT)
Dept: CARDIOLOGY | Facility: CLINIC | Age: 59
End: 2024-02-09
Payer: COMMERCIAL

## 2024-02-09 ENCOUNTER — APPOINTMENT (OUTPATIENT)
Dept: INTERPRETER SERVICES | Facility: CLINIC | Age: 59
End: 2024-02-09
Payer: COMMERCIAL

## 2024-02-20 DIAGNOSIS — Z91.09 ENVIRONMENTAL ALLERGIES: ICD-10-CM

## 2024-02-20 RX ORDER — CETIRIZINE HYDROCHLORIDE 10 MG/1
TABLET ORAL
Qty: 90 TABLET | Refills: 1 | Status: SHIPPED | OUTPATIENT
Start: 2024-02-20 | End: 2024-08-14

## 2024-02-23 ENCOUNTER — TELEPHONE (OUTPATIENT)
Dept: CARDIOLOGY | Facility: CLINIC | Age: 59
End: 2024-02-23
Payer: COMMERCIAL

## 2024-02-23 NOTE — TELEPHONE ENCOUNTER
Patient is scheduled for a right heart cath next week Wednesday. Patient was called via  services, no answer, voice message left with instructions listed below for right heart cath, including NPO status and recommendation to hold Januvia and farxiga for 3 days prior to procedure. Date and location also left on voice message.  Pre-procedure instructions - Right heart catheterization  Patient Education    Your arrival time is 12:00 on 2/28/23.  Location is [address] Waiting Room  Please plan on being at the hospital all day.  At any time, emergencies and/or urgent cases may come up which could delay the start of your procedure.    Pre-procedure instructions - Right heart catheterization  No solid food for 8 hours prior  Nothing to drink 2 hours prior to arrival time  You can take your morning medications (except diabetic and blood thinners) with sips of water  We recommend you arrange for a ride to drop you off and pick you up, in the instance, you are unable to drive home, however you should be able to function as you normally would after the procedure     Diabetic Medication Instructions  Hold oral diabetic medication in morning of your procedure and for 48 hours after IV contrast is given  Typical instructions for insulin diabetic medication holding are below. However, please reach out to your Primary Care Provider or Endocrinologist for specific instructions  DO NOT take any oral diabetic medication, short-acting diabetes medications/insulin, humalog or regular insulin the morning of your test  Take   dose of long-acting insulin (Lantus, Levemir) the day of your test  Remember to bring your glucometer and insulin with you to take after your test if needed  GLP-1 Agonists Instructions  DO NOT take injectable GLP-1 agonists semaglutide (Ozempic, Wegovy), dulaglutide (Trulicity), exenatide ER (Bydureon), tirzepatide  (Mounjaro), or oral semaglutide (Rybelsus) for 7 days prior your procedure  Hold once daily injectable GLP-1 agonists exenatide (Byetta), liraglutide (Saxenda, Victoza), lixisenatide (Soligua) the day before and day of your procedure                Anticoagulation Medication Instructions   NA    You will need to follow up with one of our cardiology APPs 1-2 weeks after your procedure. If you need help scheduling or rescheduling your appointment, please call [phone]

## 2024-02-28 ENCOUNTER — APPOINTMENT (OUTPATIENT)
Dept: MEDSURG UNIT | Facility: CLINIC | Age: 59
End: 2024-02-28
Attending: INTERNAL MEDICINE
Payer: COMMERCIAL

## 2024-02-28 ENCOUNTER — APPOINTMENT (OUTPATIENT)
Dept: LAB | Facility: CLINIC | Age: 59
End: 2024-02-28
Attending: INTERNAL MEDICINE
Payer: COMMERCIAL

## 2024-02-28 ENCOUNTER — CARE COORDINATION (OUTPATIENT)
Dept: CARDIOLOGY | Facility: CLINIC | Age: 59
End: 2024-02-28

## 2024-02-28 ENCOUNTER — HOSPITAL ENCOUNTER (OUTPATIENT)
Facility: CLINIC | Age: 59
Discharge: HOME | End: 2024-02-28
Attending: INTERNAL MEDICINE | Admitting: INTERNAL MEDICINE
Payer: COMMERCIAL

## 2024-02-28 VITALS
DIASTOLIC BLOOD PRESSURE: 90 MMHG | BODY MASS INDEX: 24.8 KG/M2 | HEIGHT: 62 IN | OXYGEN SATURATION: 97 % | WEIGHT: 134.8 LBS | HEART RATE: 101 BPM | TEMPERATURE: 98.3 F | SYSTOLIC BLOOD PRESSURE: 154 MMHG | RESPIRATION RATE: 18 BRPM

## 2024-02-28 DIAGNOSIS — I50.22 CHRONIC SYSTOLIC CONGESTIVE HEART FAILURE (H): Primary | ICD-10-CM

## 2024-02-28 DIAGNOSIS — I25.5 ISCHEMIC CARDIOMYOPATHY: ICD-10-CM

## 2024-02-28 DIAGNOSIS — I10 BENIGN ESSENTIAL HYPERTENSION: ICD-10-CM

## 2024-02-28 DIAGNOSIS — I51.89 OTHER ILL-DEFINED HEART DISEASES: ICD-10-CM

## 2024-02-28 DIAGNOSIS — M79.10 MYALGIA DUE TO STATIN: ICD-10-CM

## 2024-02-28 DIAGNOSIS — E78.2 MIXED HYPERLIPIDEMIA: ICD-10-CM

## 2024-02-28 DIAGNOSIS — Z98.890 HISTORY OF MITRAL VALVE REPAIR: ICD-10-CM

## 2024-02-28 DIAGNOSIS — I50.22 CHRONIC SYSTOLIC CONGESTIVE HEART FAILURE (H): ICD-10-CM

## 2024-02-28 DIAGNOSIS — T46.6X5A MYALGIA DUE TO STATIN: ICD-10-CM

## 2024-02-28 LAB
ALBUMIN SERPL BCG-MCNC: 4.3 G/DL (ref 3.5–5.2)
ALP SERPL-CCNC: 69 U/L (ref 40–150)
ALT SERPL W P-5'-P-CCNC: 29 U/L (ref 0–70)
ANION GAP SERPL CALCULATED.3IONS-SCNC: 10 MMOL/L (ref 7–15)
AST SERPL W P-5'-P-CCNC: 33 U/L (ref 0–45)
BILIRUB SERPL-MCNC: 0.6 MG/DL
BUN SERPL-MCNC: 26.2 MG/DL (ref 6–20)
CALCIUM SERPL-MCNC: 9 MG/DL (ref 8.6–10)
CHLORIDE SERPL-SCNC: 105 MMOL/L (ref 98–107)
CREAT SERPL-MCNC: 0.97 MG/DL (ref 0.67–1.17)
DEPRECATED HCO3 PLAS-SCNC: 22 MMOL/L (ref 22–29)
EGFRCR SERPLBLD CKD-EPI 2021: >90 ML/MIN/1.73M2
GLUCOSE SERPL-MCNC: 141 MG/DL (ref 70–99)
INR PPP: 0.99 (ref 0.85–1.15)
POTASSIUM SERPL-SCNC: 4.9 MMOL/L (ref 3.4–5.3)
PROT SERPL-MCNC: 7.7 G/DL (ref 6.4–8.3)
SODIUM SERPL-SCNC: 137 MMOL/L (ref 135–145)

## 2024-02-28 PROCEDURE — 999N000142 HC STATISTIC PROCEDURE PREP ONLY

## 2024-02-28 PROCEDURE — 36415 COLL VENOUS BLD VENIPUNCTURE: CPT | Performed by: INTERNAL MEDICINE

## 2024-02-28 PROCEDURE — 80053 COMPREHEN METABOLIC PANEL: CPT | Performed by: INTERNAL MEDICINE

## 2024-02-28 PROCEDURE — 85610 PROTHROMBIN TIME: CPT | Performed by: INTERNAL MEDICINE

## 2024-02-28 RX ORDER — LIDOCAINE 40 MG/G
CREAM TOPICAL
Status: COMPLETED | OUTPATIENT
Start: 2024-02-28 | End: 2024-02-28

## 2024-02-28 RX ORDER — LIDOCAINE 40 MG/G
CREAM TOPICAL
OUTPATIENT
Start: 2024-02-28

## 2024-02-28 ASSESSMENT — ACTIVITIES OF DAILY LIVING (ADL)
ADLS_ACUITY_SCORE: 39
ADLS_ACUITY_SCORE: 37

## 2024-02-28 NOTE — PROGRESS NOTES
Pt arrived to [facility] from home for RHC. VSS. Denies pain. Waiting on Consent  . Waiting for  Lab result . H&P current. Allergies reviewed with pt. Appropriately NPO.  Prep completed. Pt will be taking Uber home will be transporting patient to home. Pt stated that he gets rash with LMX cream; RN will notify provider.

## 2024-02-29 ENCOUNTER — ANCILLARY PROCEDURE (OUTPATIENT)
Dept: CARDIOLOGY | Facility: CLINIC | Age: 59
End: 2024-02-29
Attending: INTERNAL MEDICINE
Payer: COMMERCIAL

## 2024-02-29 DIAGNOSIS — I42.9 CARDIOMYOPATHY (H): ICD-10-CM

## 2024-02-29 PROCEDURE — 93296 REM INTERROG EVL PM/IDS: CPT

## 2024-02-29 PROCEDURE — 93295 DEV INTERROG REMOTE 1/2/MLT: CPT | Performed by: INTERNAL MEDICINE

## 2024-03-08 DIAGNOSIS — E11.59 TYPE 2 DIABETES MELLITUS WITH OTHER CIRCULATORY COMPLICATION, WITHOUT LONG-TERM CURRENT USE OF INSULIN (H): ICD-10-CM

## 2024-03-08 DIAGNOSIS — I50.22 CHRONIC SYSTOLIC CONGESTIVE HEART FAILURE (H): ICD-10-CM

## 2024-03-08 RX ORDER — DAPAGLIFLOZIN 10 MG/1
TABLET, FILM COATED ORAL
Qty: 90 TABLET | Refills: 0 | Status: SHIPPED | OUTPATIENT
Start: 2024-03-08 | End: 2024-06-18

## 2024-03-09 DIAGNOSIS — E11.9 TYPE 2 DIABETES MELLITUS WITHOUT COMPLICATION, WITHOUT LONG-TERM CURRENT USE OF INSULIN (H): ICD-10-CM

## 2024-03-11 RX ORDER — METFORMIN HCL 500 MG
1000 TABLET, EXTENDED RELEASE 24 HR ORAL
Qty: 180 TABLET | Refills: 3 | Status: SHIPPED | OUTPATIENT
Start: 2024-03-11

## 2024-03-13 LAB
MDC_IDC_LEAD_CONNECTION_STATUS: NORMAL
MDC_IDC_LEAD_IMPLANT_DT: NORMAL
MDC_IDC_LEAD_LOCATION: NORMAL
MDC_IDC_LEAD_LOCATION_DETAIL_1: NORMAL
MDC_IDC_LEAD_MFG: NORMAL
MDC_IDC_LEAD_MODEL: NORMAL
MDC_IDC_LEAD_POLARITY_TYPE: NORMAL
MDC_IDC_LEAD_SERIAL: NORMAL
MDC_IDC_LEAD_SPECIAL_FUNCTION: NORMAL
MDC_IDC_MSMT_BATTERY_DTM: NORMAL
MDC_IDC_MSMT_BATTERY_REMAINING_LONGEVITY: 69 MO
MDC_IDC_MSMT_BATTERY_RRT_TRIGGER: 2.73
MDC_IDC_MSMT_BATTERY_STATUS: NORMAL
MDC_IDC_MSMT_BATTERY_VOLTAGE: 2.99 V
MDC_IDC_MSMT_CAP_CHARGE_DTM: NORMAL
MDC_IDC_MSMT_CAP_CHARGE_ENERGY: 18 J
MDC_IDC_MSMT_CAP_CHARGE_TIME: 3.81
MDC_IDC_MSMT_CAP_CHARGE_TYPE: NORMAL
MDC_IDC_MSMT_LEADCHNL_RV_IMPEDANCE_VALUE: 266 OHM
MDC_IDC_MSMT_LEADCHNL_RV_IMPEDANCE_VALUE: 342 OHM
MDC_IDC_MSMT_LEADCHNL_RV_PACING_THRESHOLD_AMPLITUDE: 0.5 V
MDC_IDC_MSMT_LEADCHNL_RV_PACING_THRESHOLD_PULSEWIDTH: 0.4 MS
MDC_IDC_MSMT_LEADCHNL_RV_SENSING_INTR_AMPL: 8.5 MV
MDC_IDC_PG_IMPLANT_DTM: NORMAL
MDC_IDC_PG_MFG: NORMAL
MDC_IDC_PG_MODEL: NORMAL
MDC_IDC_PG_SERIAL: NORMAL
MDC_IDC_PG_TYPE: NORMAL
MDC_IDC_SESS_CLINIC_NAME: NORMAL
MDC_IDC_SESS_DTM: NORMAL
MDC_IDC_SESS_TYPE: NORMAL
MDC_IDC_SET_BRADY_HYSTRATE: NORMAL
MDC_IDC_SET_BRADY_LOWRATE: 40 {BEATS}/MIN
MDC_IDC_SET_BRADY_MODE: NORMAL
MDC_IDC_SET_LEADCHNL_RV_PACING_AMPLITUDE: 2 V
MDC_IDC_SET_LEADCHNL_RV_PACING_ANODE_ELECTRODE_1: NORMAL
MDC_IDC_SET_LEADCHNL_RV_PACING_ANODE_LOCATION_1: NORMAL
MDC_IDC_SET_LEADCHNL_RV_PACING_CAPTURE_MODE: NORMAL
MDC_IDC_SET_LEADCHNL_RV_PACING_CATHODE_ELECTRODE_1: NORMAL
MDC_IDC_SET_LEADCHNL_RV_PACING_CATHODE_LOCATION_1: NORMAL
MDC_IDC_SET_LEADCHNL_RV_PACING_POLARITY: NORMAL
MDC_IDC_SET_LEADCHNL_RV_PACING_PULSEWIDTH: 0.4 MS
MDC_IDC_SET_LEADCHNL_RV_SENSING_ANODE_ELECTRODE_1: NORMAL
MDC_IDC_SET_LEADCHNL_RV_SENSING_ANODE_LOCATION_1: NORMAL
MDC_IDC_SET_LEADCHNL_RV_SENSING_CATHODE_ELECTRODE_1: NORMAL
MDC_IDC_SET_LEADCHNL_RV_SENSING_CATHODE_LOCATION_1: NORMAL
MDC_IDC_SET_LEADCHNL_RV_SENSING_POLARITY: NORMAL
MDC_IDC_SET_LEADCHNL_RV_SENSING_SENSITIVITY: 0.3 MV
MDC_IDC_SET_ZONE_DETECTION_BEATS_DENOMINATOR: 16 {BEATS}
MDC_IDC_SET_ZONE_DETECTION_BEATS_DENOMINATOR: 32 {BEATS}
MDC_IDC_SET_ZONE_DETECTION_BEATS_DENOMINATOR: 40 {BEATS}
MDC_IDC_SET_ZONE_DETECTION_BEATS_NUMERATOR: 16 {BEATS}
MDC_IDC_SET_ZONE_DETECTION_BEATS_NUMERATOR: 30 {BEATS}
MDC_IDC_SET_ZONE_DETECTION_BEATS_NUMERATOR: 32 {BEATS}
MDC_IDC_SET_ZONE_DETECTION_INTERVAL: 300 MS
MDC_IDC_SET_ZONE_DETECTION_INTERVAL: 360 MS
MDC_IDC_SET_ZONE_DETECTION_INTERVAL: 390 MS
MDC_IDC_SET_ZONE_DETECTION_INTERVAL: NORMAL
MDC_IDC_SET_ZONE_STATUS: NORMAL
MDC_IDC_SET_ZONE_TYPE: NORMAL
MDC_IDC_SET_ZONE_VENDOR_TYPE: NORMAL
MDC_IDC_STAT_AT_BURDEN_PERCENT: 0 %
MDC_IDC_STAT_AT_DTM_END: NORMAL
MDC_IDC_STAT_AT_DTM_START: NORMAL
MDC_IDC_STAT_BRADY_DTM_END: NORMAL
MDC_IDC_STAT_BRADY_DTM_START: NORMAL
MDC_IDC_STAT_BRADY_RV_PERCENT_PACED: 0.01 %
MDC_IDC_STAT_EPISODE_RECENT_COUNT: 0
MDC_IDC_STAT_EPISODE_RECENT_COUNT_DTM_END: NORMAL
MDC_IDC_STAT_EPISODE_RECENT_COUNT_DTM_START: NORMAL
MDC_IDC_STAT_EPISODE_TOTAL_COUNT: 0
MDC_IDC_STAT_EPISODE_TOTAL_COUNT_DTM_END: NORMAL
MDC_IDC_STAT_EPISODE_TOTAL_COUNT_DTM_START: NORMAL
MDC_IDC_STAT_EPISODE_TYPE: NORMAL
MDC_IDC_STAT_TACHYTHERAPY_ATP_DELIVERED_RECENT: 0
MDC_IDC_STAT_TACHYTHERAPY_ATP_DELIVERED_TOTAL: 0
MDC_IDC_STAT_TACHYTHERAPY_RECENT_DTM_END: NORMAL
MDC_IDC_STAT_TACHYTHERAPY_RECENT_DTM_START: NORMAL
MDC_IDC_STAT_TACHYTHERAPY_SHOCKS_ABORTED_RECENT: 0
MDC_IDC_STAT_TACHYTHERAPY_SHOCKS_ABORTED_TOTAL: 0
MDC_IDC_STAT_TACHYTHERAPY_SHOCKS_DELIVERED_RECENT: 0
MDC_IDC_STAT_TACHYTHERAPY_SHOCKS_DELIVERED_TOTAL: 0
MDC_IDC_STAT_TACHYTHERAPY_TOTAL_DTM_END: NORMAL
MDC_IDC_STAT_TACHYTHERAPY_TOTAL_DTM_START: NORMAL

## 2024-04-02 ENCOUNTER — TELEPHONE (OUTPATIENT)
Dept: CARDIOLOGY | Facility: CLINIC | Age: 59
End: 2024-04-02
Payer: COMMERCIAL

## 2024-04-02 NOTE — TELEPHONE ENCOUNTER
Called waitlist patient and offered an appointment with Dr. Collier on this date 04/04/ & 2:30pm  at this location [facility]     Please note there is no guarantee this appointment will be available as it is first come first serve.

## 2024-04-12 DIAGNOSIS — R21 RASH: ICD-10-CM

## 2024-04-15 RX ORDER — TRIAMCINOLONE ACETONIDE 1 MG/G
CREAM TOPICAL 2 TIMES DAILY
Qty: 30 G | Refills: 0 | Status: SHIPPED | OUTPATIENT
Start: 2024-04-15

## 2024-04-15 NOTE — TELEPHONE ENCOUNTER
Prescription approved per [facility] Refill Protocol.  Allison Cortes RN  [facility]

## 2024-05-07 ENCOUNTER — APPOINTMENT (OUTPATIENT)
Dept: INTERPRETER SERVICES | Facility: CLINIC | Age: 59
End: 2024-05-07
Payer: COMMERCIAL

## 2024-05-07 ENCOUNTER — TELEPHONE (OUTPATIENT)
Dept: CARDIOLOGY | Facility: CLINIC | Age: 59
End: 2024-05-07
Payer: COMMERCIAL

## 2024-06-04 ENCOUNTER — ANCILLARY PROCEDURE (OUTPATIENT)
Dept: CARDIOLOGY | Facility: CLINIC | Age: 59
End: 2024-06-04
Attending: INTERNAL MEDICINE
Payer: COMMERCIAL

## 2024-06-04 DIAGNOSIS — I42.9 CARDIOMYOPATHY (H): ICD-10-CM

## 2024-06-04 PROCEDURE — 93295 DEV INTERROG REMOTE 1/2/MLT: CPT | Performed by: INTERNAL MEDICINE

## 2024-06-04 PROCEDURE — 93296 REM INTERROG EVL PM/IDS: CPT

## 2024-06-16 DIAGNOSIS — I50.22 CHRONIC SYSTOLIC CONGESTIVE HEART FAILURE (H): ICD-10-CM

## 2024-06-16 DIAGNOSIS — E11.59 TYPE 2 DIABETES MELLITUS WITH OTHER CIRCULATORY COMPLICATION, WITHOUT LONG-TERM CURRENT USE OF INSULIN (H): ICD-10-CM

## 2024-06-17 RX ORDER — DAPAGLIFLOZIN 10 MG/1
TABLET, FILM COATED ORAL
Qty: 90 TABLET | Refills: 0 | OUTPATIENT
Start: 2024-06-17

## 2024-06-17 NOTE — TELEPHONE ENCOUNTER
He is overdue for his diabetes follow-up. Please ask him to schedule this appointment ASAP. Can refill medication temporarily if he anticipates running out prior to scheduled appointment.     Thank you.     ---    May use any virtual or virtual release spot for an in-person visit.     Patient may also be seen at noon (arrival time 11:40am) Mondays, Wednesdays, Thursdays, and Fridays OR at 1:00pm (arrival time 12:40pm) on Thursdays (during the huddle).    Please do not schedule in a same day, next day, or hospital follow-up slot.    Okay to double book lunch slot (but patient should still arrive by 11:40am).

## 2024-06-18 ENCOUNTER — TELEPHONE (OUTPATIENT)
Dept: CARDIOLOGY | Facility: CLINIC | Age: 59
End: 2024-06-18
Payer: COMMERCIAL

## 2024-06-18 RX ORDER — DAPAGLIFLOZIN 10 MG/1
10 TABLET, FILM COATED ORAL DAILY
Qty: 90 TABLET | Refills: 0 | Status: SHIPPED | OUTPATIENT
Start: 2024-06-18 | End: 2024-09-24

## 2024-06-18 NOTE — TELEPHONE ENCOUNTER
I did fax the denial letter to the pharmacy.  However, I will send a PA request for the Brand over CMM since that is what is preferred.

## 2024-06-18 NOTE — TELEPHONE ENCOUNTER
Retail Pharmacy Prior Authorization Team   Phone: [phone]        Prior Authorization Not Needed per Insurance    Medication: FARXIGA 10 MG PO TABS  Insurance Company: [employer] Non-Specialty PA's - Phone [phone] Fax [phone]  Expected CoPay: $    Pharmacy Filling the Rx: [id number] [place], [place] - [address]  Pharmacy Notified: Sent fax that states that Brand is preferred also left a voicemail at the pharmacy.  Patient Notified: No **Instructed pharmacy to notify patient when script is ready to /ship.**

## 2024-06-18 NOTE — TELEPHONE ENCOUNTER
"Retail Pharmacy Prior Authorization Team   Phone: [phone]        PRIOR AUTHORIZATION DENIED    Medication: DAPAGLIFLOZIN PROPANEDIOL 10 MG PO TABS  Insurance Company: [employer] Non-Specialty PA's - Phone [phone] Fax [phone]  Denial Date: 6/18/2024  Denial Reason(s):         Appeal Information: Appeal requests require a letter of medical necessity stating why the preferred formulary medication are in appropriate in the patient's therapy/condition. Once the letter has been prepared and placed in the patient's chart under the \"letters\" tab, please route back to me directly: PATRICIA MANZO and I can send the appeal      Patient Notified: No. The [employer] PA Team does not notify of the denial outcomes as the patient often will ask what their provider will prescribe in place of the denied medication, or additional information in regards to other therapies they can take in place of the denied medication.  This is not something we can advise on in our department.    "

## 2024-06-19 LAB
MDC_IDC_LEAD_CONNECTION_STATUS: NORMAL
MDC_IDC_LEAD_IMPLANT_DT: NORMAL
MDC_IDC_LEAD_LOCATION: NORMAL
MDC_IDC_LEAD_LOCATION_DETAIL_1: NORMAL
MDC_IDC_LEAD_MFG: NORMAL
MDC_IDC_LEAD_MODEL: NORMAL
MDC_IDC_LEAD_POLARITY_TYPE: NORMAL
MDC_IDC_LEAD_SERIAL: NORMAL
MDC_IDC_LEAD_SPECIAL_FUNCTION: NORMAL
MDC_IDC_MSMT_BATTERY_DTM: NORMAL
MDC_IDC_MSMT_BATTERY_REMAINING_LONGEVITY: 64 MO
MDC_IDC_MSMT_BATTERY_RRT_TRIGGER: 2.73
MDC_IDC_MSMT_BATTERY_STATUS: NORMAL
MDC_IDC_MSMT_BATTERY_VOLTAGE: 2.99 V
MDC_IDC_MSMT_CAP_CHARGE_DTM: NORMAL
MDC_IDC_MSMT_CAP_CHARGE_ENERGY: 18 J
MDC_IDC_MSMT_CAP_CHARGE_TIME: 3.84
MDC_IDC_MSMT_CAP_CHARGE_TYPE: NORMAL
MDC_IDC_MSMT_LEADCHNL_RV_IMPEDANCE_VALUE: 304 OHM
MDC_IDC_MSMT_LEADCHNL_RV_IMPEDANCE_VALUE: 342 OHM
MDC_IDC_MSMT_LEADCHNL_RV_PACING_THRESHOLD_AMPLITUDE: 0.5 V
MDC_IDC_MSMT_LEADCHNL_RV_PACING_THRESHOLD_PULSEWIDTH: 0.4 MS
MDC_IDC_MSMT_LEADCHNL_RV_SENSING_INTR_AMPL: 7.38 MV
MDC_IDC_PG_IMPLANT_DTM: NORMAL
MDC_IDC_PG_MFG: NORMAL
MDC_IDC_PG_MODEL: NORMAL
MDC_IDC_PG_SERIAL: NORMAL
MDC_IDC_PG_TYPE: NORMAL
MDC_IDC_SESS_CLINIC_NAME: NORMAL
MDC_IDC_SESS_DTM: NORMAL
MDC_IDC_SESS_TYPE: NORMAL
MDC_IDC_SET_BRADY_HYSTRATE: NORMAL
MDC_IDC_SET_BRADY_LOWRATE: 40 {BEATS}/MIN
MDC_IDC_SET_BRADY_MODE: NORMAL
MDC_IDC_SET_LEADCHNL_RV_PACING_AMPLITUDE: 2 V
MDC_IDC_SET_LEADCHNL_RV_PACING_ANODE_ELECTRODE_1: NORMAL
MDC_IDC_SET_LEADCHNL_RV_PACING_ANODE_LOCATION_1: NORMAL
MDC_IDC_SET_LEADCHNL_RV_PACING_CAPTURE_MODE: NORMAL
MDC_IDC_SET_LEADCHNL_RV_PACING_CATHODE_ELECTRODE_1: NORMAL
MDC_IDC_SET_LEADCHNL_RV_PACING_CATHODE_LOCATION_1: NORMAL
MDC_IDC_SET_LEADCHNL_RV_PACING_POLARITY: NORMAL
MDC_IDC_SET_LEADCHNL_RV_PACING_PULSEWIDTH: 0.4 MS
MDC_IDC_SET_LEADCHNL_RV_SENSING_ANODE_ELECTRODE_1: NORMAL
MDC_IDC_SET_LEADCHNL_RV_SENSING_ANODE_LOCATION_1: NORMAL
MDC_IDC_SET_LEADCHNL_RV_SENSING_CATHODE_ELECTRODE_1: NORMAL
MDC_IDC_SET_LEADCHNL_RV_SENSING_CATHODE_LOCATION_1: NORMAL
MDC_IDC_SET_LEADCHNL_RV_SENSING_POLARITY: NORMAL
MDC_IDC_SET_LEADCHNL_RV_SENSING_SENSITIVITY: 0.3 MV
MDC_IDC_SET_ZONE_DETECTION_BEATS_DENOMINATOR: 16 {BEATS}
MDC_IDC_SET_ZONE_DETECTION_BEATS_DENOMINATOR: 32 {BEATS}
MDC_IDC_SET_ZONE_DETECTION_BEATS_DENOMINATOR: 40 {BEATS}
MDC_IDC_SET_ZONE_DETECTION_BEATS_NUMERATOR: 16 {BEATS}
MDC_IDC_SET_ZONE_DETECTION_BEATS_NUMERATOR: 30 {BEATS}
MDC_IDC_SET_ZONE_DETECTION_BEATS_NUMERATOR: 32 {BEATS}
MDC_IDC_SET_ZONE_DETECTION_INTERVAL: 300 MS
MDC_IDC_SET_ZONE_DETECTION_INTERVAL: 360 MS
MDC_IDC_SET_ZONE_DETECTION_INTERVAL: 390 MS
MDC_IDC_SET_ZONE_DETECTION_INTERVAL: NORMAL
MDC_IDC_SET_ZONE_STATUS: NORMAL
MDC_IDC_SET_ZONE_TYPE: NORMAL
MDC_IDC_SET_ZONE_VENDOR_TYPE: NORMAL
MDC_IDC_STAT_AT_BURDEN_PERCENT: 0 %
MDC_IDC_STAT_AT_DTM_END: NORMAL
MDC_IDC_STAT_AT_DTM_START: NORMAL
MDC_IDC_STAT_BRADY_DTM_END: NORMAL
MDC_IDC_STAT_BRADY_DTM_START: NORMAL
MDC_IDC_STAT_BRADY_RV_PERCENT_PACED: 0.01 %
MDC_IDC_STAT_EPISODE_RECENT_COUNT: 0
MDC_IDC_STAT_EPISODE_RECENT_COUNT_DTM_END: NORMAL
MDC_IDC_STAT_EPISODE_RECENT_COUNT_DTM_START: NORMAL
MDC_IDC_STAT_EPISODE_TOTAL_COUNT: 0
MDC_IDC_STAT_EPISODE_TOTAL_COUNT_DTM_END: NORMAL
MDC_IDC_STAT_EPISODE_TOTAL_COUNT_DTM_START: NORMAL
MDC_IDC_STAT_EPISODE_TYPE: NORMAL
MDC_IDC_STAT_TACHYTHERAPY_ATP_DELIVERED_RECENT: 0
MDC_IDC_STAT_TACHYTHERAPY_ATP_DELIVERED_TOTAL: 0
MDC_IDC_STAT_TACHYTHERAPY_RECENT_DTM_END: NORMAL
MDC_IDC_STAT_TACHYTHERAPY_RECENT_DTM_START: NORMAL
MDC_IDC_STAT_TACHYTHERAPY_SHOCKS_ABORTED_RECENT: 0
MDC_IDC_STAT_TACHYTHERAPY_SHOCKS_ABORTED_TOTAL: 0
MDC_IDC_STAT_TACHYTHERAPY_SHOCKS_DELIVERED_RECENT: 0
MDC_IDC_STAT_TACHYTHERAPY_SHOCKS_DELIVERED_TOTAL: 0
MDC_IDC_STAT_TACHYTHERAPY_TOTAL_DTM_END: NORMAL
MDC_IDC_STAT_TACHYTHERAPY_TOTAL_DTM_START: NORMAL

## 2024-06-27 ENCOUNTER — OFFICE VISIT (OUTPATIENT)
Dept: INTERNAL MEDICINE | Facility: CLINIC | Age: 59
End: 2024-06-27
Payer: COMMERCIAL

## 2024-06-27 ENCOUNTER — VIRTUAL VISIT (OUTPATIENT)
Dept: INTERPRETER SERVICES | Facility: CLINIC | Age: 59
End: 2024-06-27

## 2024-06-27 VITALS
BODY MASS INDEX: 24.33 KG/M2 | OXYGEN SATURATION: 100 % | HEART RATE: 80 BPM | SYSTOLIC BLOOD PRESSURE: 126 MMHG | TEMPERATURE: 97 F | WEIGHT: 133 LBS | DIASTOLIC BLOOD PRESSURE: 80 MMHG

## 2024-06-27 DIAGNOSIS — Z12.11 SPECIAL SCREENING FOR MALIGNANT NEOPLASMS, COLON: ICD-10-CM

## 2024-06-27 DIAGNOSIS — E11.9 TYPE 2 DIABETES MELLITUS WITHOUT COMPLICATION, WITHOUT LONG-TERM CURRENT USE OF INSULIN (H): Primary | ICD-10-CM

## 2024-06-27 LAB
CHOLEST SERPL-MCNC: 129 MG/DL
FASTING STATUS PATIENT QL REPORTED: YES
HBA1C MFR BLD: 5.8 % (ref 0–5.6)
HDLC SERPL-MCNC: 43 MG/DL
LDLC SERPL CALC-MCNC: 65 MG/DL
NONHDLC SERPL-MCNC: 86 MG/DL
TRIGL SERPL-MCNC: 106 MG/DL

## 2024-06-27 PROCEDURE — 83036 HEMOGLOBIN GLYCOSYLATED A1C: CPT | Performed by: INTERNAL MEDICINE

## 2024-06-27 PROCEDURE — 99213 OFFICE O/P EST LOW 20 MIN: CPT | Performed by: INTERNAL MEDICINE

## 2024-06-27 PROCEDURE — 80061 LIPID PANEL: CPT | Performed by: INTERNAL MEDICINE

## 2024-06-27 PROCEDURE — 36415 COLL VENOUS BLD VENIPUNCTURE: CPT | Performed by: INTERNAL MEDICINE

## 2024-06-27 PROCEDURE — T1013 SIGN LANG/ORAL INTERPRETER: HCPCS | Mod: U3

## 2024-06-27 NOTE — LETTER
June 27, 2024      Luke DEBBI Earlene  [address]  [facility] [address]          Dear ,    We are writing to inform you of your test results.    Your test results fall within the expected range(s) or remain unchanged from previous results.  Please continue with current treatment plan.    Resulted Orders   Hemoglobin A1c   Result Value Ref Range    Hemoglobin A1C 5.8 (H) 0.0 - 5.6 %   Lipid Profile   Result Value Ref Range    Cholesterol 129 <200 mg/dL    Triglycerides 106 <150 mg/dL    Direct Measure HDL 43 >=40 mg/dL    LDL Cholesterol Calculated 65 <=100 mg/dL    Non HDL Cholesterol 86 <130 mg/dL    Patient Fasting > 8hrs? Yes      If you have any questions or concerns, please call the clinic at the number listed above.       Sincerely,      Shanna Church MD

## 2024-06-27 NOTE — PROGRESS NOTES
ASSESSMENT/PLAN                                                      (E11.9) Type 2 diabetes mellitus without complication, without long-term current use of insulin (H)  (primary encounter diagnosis)  Plan: labs today; recommendations to follow; diabetes follow-up in 6 months; patient encouraged to schedule his annual diabetic eye exam.    (Z12.11) Special screening for malignant neoplasms, colon  Comment: patient declines colon cancer screening going forward.     Shanna Church MD   [address] [address]  T: [phone], F: [phone]    YAMILE Henao is a very pleasant 58 year old male who presents for diabetes follow-up:    [employer] phone  utilized.    Patient's current diabetes regimen is Farxiga 10mg daily and Januvia 100mg daily. He is adherent to his diabetic regimen and is not at risk of hypoglycemia with current medications. He is adherent to his diabetic diet. No diabetic complications. Annual diabetic eye exam is NOT up to date. He is checking his feet regularly. His pertinent vaccinations (Pneumovax, influenza) are up to date. He is on a daily baby aspirin, a statin, and an ACE-I or ARB. Blood pressure is well-controlled on current medications.      OBJECTIVE                                                      /80   Pulse 80   Temp 97  F (36.1  C)   Wt 60.3 kg (133 lb)   SpO2 100%   BMI 24.33 kg/m    Constitutional: well-appearing  Respiratory: normal respiratory effort; clear to auscultation bilaterally  Cardiovascular: regular rate and rhythm; no edema  Gastrointestinal: soft, non-tender, and non-distended; no organomegaly or masses  Musculoskeletal: normal gait and station  Psych: normal judgment and insight; normal mood and affect; recent and remote memory intact    ---    (Note documentation was completed, in part, with [employer] voice-recognition software.  Documentation was reviewed, but some grammatical, spelling, and word errors may remain.)

## 2024-08-14 DIAGNOSIS — Z91.09 ENVIRONMENTAL ALLERGIES: ICD-10-CM

## 2024-08-14 RX ORDER — CETIRIZINE HYDROCHLORIDE 10 MG/1
TABLET ORAL
Qty: 90 TABLET | Refills: 0 | Status: SHIPPED | OUTPATIENT
Start: 2024-08-14

## 2024-09-03 ENCOUNTER — ANCILLARY PROCEDURE (OUTPATIENT)
Dept: CARDIOLOGY | Facility: CLINIC | Age: 59
End: 2024-09-03
Attending: INTERNAL MEDICINE
Payer: COMMERCIAL

## 2024-09-03 DIAGNOSIS — I42.9 CARDIOMYOPATHY (H): ICD-10-CM

## 2024-09-03 PROCEDURE — 93295 DEV INTERROG REMOTE 1/2/MLT: CPT | Performed by: INTERNAL MEDICINE

## 2024-09-03 PROCEDURE — 93296 REM INTERROG EVL PM/IDS: CPT

## 2024-09-09 LAB
MDC_IDC_LEAD_CONNECTION_STATUS: NORMAL
MDC_IDC_LEAD_IMPLANT_DT: NORMAL
MDC_IDC_LEAD_LOCATION: NORMAL
MDC_IDC_LEAD_LOCATION_DETAIL_1: NORMAL
MDC_IDC_LEAD_MFG: NORMAL
MDC_IDC_LEAD_MODEL: NORMAL
MDC_IDC_LEAD_POLARITY_TYPE: NORMAL
MDC_IDC_LEAD_SERIAL: NORMAL
MDC_IDC_LEAD_SPECIAL_FUNCTION: NORMAL
MDC_IDC_MSMT_BATTERY_DTM: NORMAL
MDC_IDC_MSMT_BATTERY_REMAINING_LONGEVITY: 58 MO
MDC_IDC_MSMT_BATTERY_RRT_TRIGGER: 2.73
MDC_IDC_MSMT_BATTERY_STATUS: NORMAL
MDC_IDC_MSMT_BATTERY_VOLTAGE: 2.99 V
MDC_IDC_MSMT_LEADCHNL_RV_IMPEDANCE_VALUE: 304 OHM
MDC_IDC_MSMT_LEADCHNL_RV_IMPEDANCE_VALUE: 342 OHM
MDC_IDC_MSMT_LEADCHNL_RV_PACING_THRESHOLD_AMPLITUDE: 0.5 V
MDC_IDC_MSMT_LEADCHNL_RV_PACING_THRESHOLD_PULSEWIDTH: 0.4 MS
MDC_IDC_MSMT_LEADCHNL_RV_SENSING_INTR_AMPL: 7.38 MV
MDC_IDC_PG_IMPLANT_DTM: NORMAL
MDC_IDC_PG_MFG: NORMAL
MDC_IDC_PG_MODEL: NORMAL
MDC_IDC_PG_SERIAL: NORMAL
MDC_IDC_PG_TYPE: NORMAL
MDC_IDC_SESS_CLINIC_NAME: NORMAL
MDC_IDC_SESS_DTM: NORMAL
MDC_IDC_SESS_TYPE: NORMAL
MDC_IDC_SET_BRADY_HYSTRATE: NORMAL
MDC_IDC_SET_BRADY_LOWRATE: 40 {BEATS}/MIN
MDC_IDC_SET_BRADY_MODE: NORMAL
MDC_IDC_SET_LEADCHNL_RV_PACING_AMPLITUDE: 2 V
MDC_IDC_SET_LEADCHNL_RV_PACING_ANODE_ELECTRODE_1: NORMAL
MDC_IDC_SET_LEADCHNL_RV_PACING_ANODE_LOCATION_1: NORMAL
MDC_IDC_SET_LEADCHNL_RV_PACING_CAPTURE_MODE: NORMAL
MDC_IDC_SET_LEADCHNL_RV_PACING_CATHODE_ELECTRODE_1: NORMAL
MDC_IDC_SET_LEADCHNL_RV_PACING_CATHODE_LOCATION_1: NORMAL
MDC_IDC_SET_LEADCHNL_RV_PACING_POLARITY: NORMAL
MDC_IDC_SET_LEADCHNL_RV_PACING_PULSEWIDTH: 0.4 MS
MDC_IDC_SET_LEADCHNL_RV_SENSING_ANODE_ELECTRODE_1: NORMAL
MDC_IDC_SET_LEADCHNL_RV_SENSING_ANODE_LOCATION_1: NORMAL
MDC_IDC_SET_LEADCHNL_RV_SENSING_CATHODE_ELECTRODE_1: NORMAL
MDC_IDC_SET_LEADCHNL_RV_SENSING_CATHODE_LOCATION_1: NORMAL
MDC_IDC_SET_LEADCHNL_RV_SENSING_POLARITY: NORMAL
MDC_IDC_SET_LEADCHNL_RV_SENSING_SENSITIVITY: 0.3 MV
MDC_IDC_SET_ZONE_DETECTION_BEATS_DENOMINATOR: 16 {BEATS}
MDC_IDC_SET_ZONE_DETECTION_BEATS_DENOMINATOR: 32 {BEATS}
MDC_IDC_SET_ZONE_DETECTION_BEATS_DENOMINATOR: 40 {BEATS}
MDC_IDC_SET_ZONE_DETECTION_BEATS_NUMERATOR: 16 {BEATS}
MDC_IDC_SET_ZONE_DETECTION_BEATS_NUMERATOR: 30 {BEATS}
MDC_IDC_SET_ZONE_DETECTION_BEATS_NUMERATOR: 32 {BEATS}
MDC_IDC_SET_ZONE_DETECTION_INTERVAL: 300 MS
MDC_IDC_SET_ZONE_DETECTION_INTERVAL: 360 MS
MDC_IDC_SET_ZONE_DETECTION_INTERVAL: 390 MS
MDC_IDC_SET_ZONE_DETECTION_INTERVAL: NORMAL
MDC_IDC_SET_ZONE_STATUS: NORMAL
MDC_IDC_SET_ZONE_TYPE: NORMAL
MDC_IDC_SET_ZONE_VENDOR_TYPE: NORMAL
MDC_IDC_STAT_AT_BURDEN_PERCENT: 0 %
MDC_IDC_STAT_AT_DTM_END: NORMAL
MDC_IDC_STAT_AT_DTM_START: NORMAL
MDC_IDC_STAT_BRADY_DTM_END: NORMAL
MDC_IDC_STAT_BRADY_DTM_START: NORMAL
MDC_IDC_STAT_BRADY_RV_PERCENT_PACED: 0.01 %
MDC_IDC_STAT_EPISODE_RECENT_COUNT: 0
MDC_IDC_STAT_EPISODE_RECENT_COUNT_DTM_END: NORMAL
MDC_IDC_STAT_EPISODE_RECENT_COUNT_DTM_START: NORMAL
MDC_IDC_STAT_EPISODE_TOTAL_COUNT: 0
MDC_IDC_STAT_EPISODE_TOTAL_COUNT_DTM_END: NORMAL
MDC_IDC_STAT_EPISODE_TOTAL_COUNT_DTM_START: NORMAL
MDC_IDC_STAT_EPISODE_TYPE: NORMAL
MDC_IDC_STAT_TACHYTHERAPY_ATP_DELIVERED_RECENT: 0
MDC_IDC_STAT_TACHYTHERAPY_ATP_DELIVERED_TOTAL: 0
MDC_IDC_STAT_TACHYTHERAPY_RECENT_DTM_END: NORMAL
MDC_IDC_STAT_TACHYTHERAPY_RECENT_DTM_START: NORMAL
MDC_IDC_STAT_TACHYTHERAPY_SHOCKS_ABORTED_RECENT: 0
MDC_IDC_STAT_TACHYTHERAPY_SHOCKS_ABORTED_TOTAL: 0
MDC_IDC_STAT_TACHYTHERAPY_SHOCKS_DELIVERED_RECENT: 0
MDC_IDC_STAT_TACHYTHERAPY_SHOCKS_DELIVERED_TOTAL: 0
MDC_IDC_STAT_TACHYTHERAPY_TOTAL_DTM_END: NORMAL
MDC_IDC_STAT_TACHYTHERAPY_TOTAL_DTM_START: NORMAL

## 2024-09-22 DIAGNOSIS — I25.5 ISCHEMIC CARDIOMYOPATHY: ICD-10-CM

## 2024-09-22 DIAGNOSIS — I10 BENIGN ESSENTIAL HYPERTENSION: ICD-10-CM

## 2024-09-22 DIAGNOSIS — I50.22 CHRONIC SYSTOLIC CONGESTIVE HEART FAILURE (H): ICD-10-CM

## 2024-09-24 ENCOUNTER — LAB (OUTPATIENT)
Dept: LAB | Facility: CLINIC | Age: 59
End: 2024-09-24
Attending: INTERNAL MEDICINE
Payer: COMMERCIAL

## 2024-09-24 ENCOUNTER — OFFICE VISIT (OUTPATIENT)
Dept: CARDIOLOGY | Facility: CLINIC | Age: 59
End: 2024-09-24
Attending: INTERNAL MEDICINE
Payer: COMMERCIAL

## 2024-09-24 VITALS
BODY MASS INDEX: 25.75 KG/M2 | DIASTOLIC BLOOD PRESSURE: 97 MMHG | OXYGEN SATURATION: 99 % | HEART RATE: 83 BPM | WEIGHT: 140.8 LBS | SYSTOLIC BLOOD PRESSURE: 146 MMHG

## 2024-09-24 DIAGNOSIS — M79.10 MYALGIA DUE TO STATIN: ICD-10-CM

## 2024-09-24 DIAGNOSIS — I10 BENIGN ESSENTIAL HYPERTENSION: ICD-10-CM

## 2024-09-24 DIAGNOSIS — T46.6X5A MYALGIA DUE TO STATIN: ICD-10-CM

## 2024-09-24 DIAGNOSIS — E11.59 TYPE 2 DIABETES MELLITUS WITH OTHER CIRCULATORY COMPLICATION, WITHOUT LONG-TERM CURRENT USE OF INSULIN (H): ICD-10-CM

## 2024-09-24 DIAGNOSIS — E78.2 MIXED HYPERLIPIDEMIA: ICD-10-CM

## 2024-09-24 DIAGNOSIS — I50.22 CHRONIC SYSTOLIC CONGESTIVE HEART FAILURE (H): ICD-10-CM

## 2024-09-24 DIAGNOSIS — I51.89 OTHER ILL-DEFINED HEART DISEASES: ICD-10-CM

## 2024-09-24 DIAGNOSIS — I25.5 ISCHEMIC CARDIOMYOPATHY: ICD-10-CM

## 2024-09-24 DIAGNOSIS — I25.10 CORONARY ARTERY DISEASE INVOLVING NATIVE CORONARY ARTERY OF NATIVE HEART WITHOUT ANGINA PECTORIS: ICD-10-CM

## 2024-09-24 DIAGNOSIS — Z98.890 HISTORY OF MITRAL VALVE REPAIR: ICD-10-CM

## 2024-09-24 LAB
ALBUMIN SERPL BCG-MCNC: 4.2 G/DL (ref 3.5–5.2)
ALP SERPL-CCNC: 46 U/L (ref 40–150)
ALT SERPL W P-5'-P-CCNC: 23 U/L (ref 0–70)
ANION GAP SERPL CALCULATED.3IONS-SCNC: 11 MMOL/L (ref 7–15)
AST SERPL W P-5'-P-CCNC: 26 U/L (ref 0–45)
BILIRUB SERPL-MCNC: 0.8 MG/DL
BUN SERPL-MCNC: 24.9 MG/DL (ref 6–20)
CALCIUM SERPL-MCNC: 8.6 MG/DL (ref 8.8–10.4)
CHLORIDE SERPL-SCNC: 107 MMOL/L (ref 98–107)
CREAT SERPL-MCNC: 1.35 MG/DL (ref 0.67–1.17)
EGFRCR SERPLBLD CKD-EPI 2021: 61 ML/MIN/1.73M2
ERYTHROCYTE [DISTWIDTH] IN BLOOD BY AUTOMATED COUNT: 13 % (ref 10–15)
GLUCOSE SERPL-MCNC: 149 MG/DL (ref 70–99)
HCO3 SERPL-SCNC: 21 MMOL/L (ref 22–29)
HCT VFR BLD AUTO: 45.9 % (ref 40–53)
HGB BLD-MCNC: 14.7 G/DL (ref 13.3–17.7)
MCH RBC QN AUTO: 30.8 PG (ref 26.5–33)
MCHC RBC AUTO-ENTMCNC: 32 G/DL (ref 31.5–36.5)
MCV RBC AUTO: 96 FL (ref 78–100)
NT-PROBNP SERPL-MCNC: 1641 PG/ML (ref 0–900)
PLATELET # BLD AUTO: 180 10E3/UL (ref 150–450)
POTASSIUM SERPL-SCNC: 5.1 MMOL/L (ref 3.4–5.3)
PROT SERPL-MCNC: 7.7 G/DL (ref 6.4–8.3)
RBC # BLD AUTO: 4.77 10E6/UL (ref 4.4–5.9)
SODIUM SERPL-SCNC: 139 MMOL/L (ref 135–145)
WBC # BLD AUTO: 11.7 10E3/UL (ref 4–11)

## 2024-09-24 PROCEDURE — 36415 COLL VENOUS BLD VENIPUNCTURE: CPT | Performed by: PATHOLOGY

## 2024-09-24 PROCEDURE — 80053 COMPREHEN METABOLIC PANEL: CPT | Performed by: PATHOLOGY

## 2024-09-24 PROCEDURE — G2211 COMPLEX E/M VISIT ADD ON: HCPCS | Performed by: INTERNAL MEDICINE

## 2024-09-24 PROCEDURE — 99213 OFFICE O/P EST LOW 20 MIN: CPT | Performed by: INTERNAL MEDICINE

## 2024-09-24 PROCEDURE — 99215 OFFICE O/P EST HI 40 MIN: CPT | Performed by: INTERNAL MEDICINE

## 2024-09-24 PROCEDURE — 83880 ASSAY OF NATRIURETIC PEPTIDE: CPT | Performed by: PATHOLOGY

## 2024-09-24 PROCEDURE — 85027 COMPLETE CBC AUTOMATED: CPT | Performed by: PATHOLOGY

## 2024-09-24 RX ORDER — ASPIRIN 81 MG/1
81 TABLET, CHEWABLE ORAL DAILY
Qty: 90 TABLET | Refills: 3 | Status: SHIPPED | OUTPATIENT
Start: 2024-09-24

## 2024-09-24 RX ORDER — METOPROLOL SUCCINATE 50 MG/1
50 TABLET, EXTENDED RELEASE ORAL DAILY
Qty: 90 TABLET | Refills: 3 | Status: SHIPPED | OUTPATIENT
Start: 2024-09-24

## 2024-09-24 RX ORDER — DAPAGLIFLOZIN 10 MG/1
10 TABLET, FILM COATED ORAL DAILY
Qty: 90 TABLET | Refills: 3 | Status: SHIPPED | OUTPATIENT
Start: 2024-09-24

## 2024-09-24 RX ORDER — ATORVASTATIN CALCIUM 40 MG/1
40 TABLET, FILM COATED ORAL DAILY
Qty: 90 TABLET | Refills: 3 | Status: SHIPPED | OUTPATIENT
Start: 2024-09-24

## 2024-09-24 RX ORDER — SACUBITRIL AND VALSARTAN 97; 103 MG/1; MG/1
1 TABLET, FILM COATED ORAL 2 TIMES DAILY
Qty: 180 TABLET | Refills: 3 | Status: SHIPPED | OUTPATIENT
Start: 2024-09-24

## 2024-09-24 RX ORDER — BUMETANIDE 0.5 MG/1
0.5 TABLET ORAL DAILY
Qty: 90 TABLET | Refills: 3 | Status: SHIPPED | OUTPATIENT
Start: 2024-09-24

## 2024-09-24 RX ORDER — SPIRONOLACTONE 25 MG/1
25 TABLET ORAL DAILY
Qty: 90 TABLET | Refills: 3 | Status: SHIPPED | OUTPATIENT
Start: 2024-09-24

## 2024-09-24 ASSESSMENT — PAIN SCALES - GENERAL: PAINLEVEL: NO PAIN (0)

## 2024-09-24 NOTE — PROGRESS NOTES
September 24, 2024     I had the pleasure of seeing Luke Henao  in the [facility]. As you know patient has a complex medical history including ICM c/b RCA STEMI (s/p POBA RCA, FEMI RCA x7, Lcx. LAD 3/2017) c/b cardiogenic shock s/p IABP, MR s/p mitraclip, HTN, DVT on AC, bilateral hemispheric strokes. He presents to Advanced Heart Failure Clinic for follow-up.     The patient has been followed in [facility] for a number of years. He was initially transferred to [facility] on 3/27/2017 with cardiogenic shock after an inferior STEMI. Patient had received a POBA to his RCA at that time, as he was considered for CABG. Initial TTE with LVEF of 30-35%. Underwent coronary angiogram with a FEMI to his RCA, LAD, and Lcx. Further hospital course was complicated by cardiogenic shock c/b IABP. He underwent a Mitraclip for ischemic MR. Underwent ICD implant on 10/2017. His GDMT was titrated over the course of the following years. He had improvement in symptoms, though his LVEF continued to be reduced. Most recently 10-20%.       In 10/2023 he had noted he has some difficulty doing his job and develops lower extremity edema by end of the day. He had a RHC on 11/2023 which results are demonstrated below. Metoprolol was decreased from 200mg daily to 100mg.  At that time we decided to proceed with right heart catheterization given worsening symptoms and significant fatigue.  Patient presented to the Cath Lab for the study however got extremely anxious and ultimately we decided to postpone the procedure.  Today he is here for follow-up.  Notes that he is doing very well in fact extremely well he has more energy denies any chest pain dizziness lightheadedness or palpitations.  Now he has all the energy and believes that decreasing the metoprolol and adding the spironolactone helped him tremendously.  He would not want any further right heart catheterizations at this time however would like to continue the medication  and request refills.  Blood pressure is a little bit more elevated here however knows that it is at home in the morning it was 100/75 mmHg.  No other complaints overall doing well.     Works with hearing aid.   Never miss medications, works well  Lives with wife, house. 20 years remarried, has 4 children with ex-wife in [place]. He moved to [place] 20 years ago. Quit cigarette 7 years ago.      PAST MEDICAL HISTORY:  Past Medical History:   Diagnosis Date    Benign essential hypertension     CAD (coronary artery disease) 2017    RCA STEMI s/p balloon angioplasty and multiple Sofie to RCA, LCx, and LAD    Chronic systolic congestive heart failure (H)     History of deep venous thrombosis 2017    left internal jugular (line-associated); left common femoral; was on coumadin    History of mitral valve repair 2017    History of stroke 2017    no residual deficits    ICD (implantable cardioverter-defibrillator) in place     Type 2 diabetes mellitus (H)      FAMILY HISTORY:  Family History   Problem Relation Age of Onset    Hypertension Mother     Diabetes Type 2  Father     Hypertension Father     Myocardial Infarction No family hx of     Cerebrovascular Disease No family hx of     Coronary Artery Disease Early Onset No family hx of     Colon Cancer No family hx of     Prostate Cancer No family hx of      SOCIAL HISTORY:  Social History     Socioeconomic History    Marital status:    Occupational History    Occupation: [employer]   Tobacco Use    Smoking status: Former     Current packs/day: 0.00     Average packs/day: 0.5 packs/day for 30.0 years (15.0 ttl pk-yrs)     Types: Cigarettes     Start date: 1987     Quit date: 2017     Years since quittin.7    Smokeless tobacco: Never   Vaping Use    Vaping status: Never Used   Substance and Sexual Activity    Alcohol use: No    Drug use: No    Sexual activity: Not Currently   Social History Narrative    . Remarried.    4 children  with first marriage.    4 grandchildren.    No formal exercise.      Social Determinants of Health     Interpersonal Safety: Low Risk  (12/13/2023)    Interpersonal Safety     Do you feel physically and emotionally safe where you currently live?: Yes     Within the past 12 months, have you been hit, slapped, kicked or otherwise physically hurt by someone?: No     Within the past 12 months, have you been humiliated or emotionally abused in other ways by your partner or ex-partner?: No     CURRENT MEDICATIONS:  Current Outpatient Medications   Medication Sig Dispense Refill    aspirin (ASPIRIN LOW DOSE) 81 MG chewable tablet Take 1 tablet (81 mg) by mouth daily 90 tablet 3    atorvastatin (LIPITOR) 40 MG tablet TAKE 1 TABLET BY MOUTH EVERY DAY 90 tablet 3    bumetanide (BUMEX) 0.5 MG tablet Take 1 tablet (0.5 mg) by mouth daily 90 tablet 3    cetirizine (ZYRTEC) 10 MG tablet TAKE 1 TABLET (10 MG) BY MOUTH DAILY. 90 tablet 0    co-enzyme Q-10 100 MG CAPS capsule Take by mouth daily      dapagliflozin (FARXIGA) 10 MG TABS tablet Take 1 tablet (10 mg) by mouth daily 90 tablet 0    diclofenac (VOLTAREN) 1 % topical gel Apply 2 g topically 4 times daily 150 g 3    JANUVIA 100 MG tablet TAKE 1 TABLET BY MOUTH EVERY DAY 90 tablet 3    metFORMIN (GLUCOPHAGE XR) 500 MG 24 hr tablet TAKE 2 TABLETS BY MOUTH DAILY WITH DINNER 180 tablet 3    metoprolol succinate ER (TOPROL XL) 50 MG 24 hr tablet Take 1 tablet (50 mg) by mouth daily 90 tablet 3    sacubitril-valsartan (ENTRESTO)  MG per tablet Take 1 tablet by mouth 2 times daily 180 tablet 3    spironolactone (ALDACTONE) 25 MG tablet Take 0.5 tablets (12.5 mg) by mouth daily 45 tablet 3    triamcinolone (KENALOG) 0.1 % external cream APPLY TO AFFECTED AREA TWICE A DAY 30 g 0     No current facility-administered medications for this visit.     EXAM:  BP (!) 145/94 (BP Location: Left arm, Patient Position: Chair, Cuff Size: Adult Regular)   Pulse 84   Wt 63.9 kg (140 lb  12.8 oz)   SpO2 99%   BMI 25.75 kg/m    General: appears comfortable, alert and oriented  Head: normocephalic, atraumatic  Eyes: anicteric sclera, EOMI , PERRL  Neck: no adenopathy  Orophyarynx: moist mucosa, no lesions noted  Heart: regular, S1/S2, no murmurs, rubs or gallop. JVP at 12  Lungs: CTAB, No wheezing.   Abdomen: soft, non-tender, slightly distended, bowel sounds present, no hepatosplenomegaly  Extremities: Trace BLE edema  Skin: Cool to ouch  Neuro: grossly non-focal     Labs:  Lab Results   Component Value Date    WBC 10.8 01/23/2024    HGB 17.7 01/23/2024    HCT 55.9 (H) 01/23/2024     01/23/2024     02/28/2024    POTASSIUM 4.9 02/28/2024    CHLORIDE 105 02/28/2024    CO2 22 02/28/2024    BUN 26.2 (H) 02/28/2024    CR 0.97 02/28/2024     (H) 02/28/2024    SED 40 (H) 06/19/2017    NTBNPI 9,984 (H) 04/08/2017    NTBNP 2,113 (H) 01/23/2024    TROPI 0.267 (HH) 04/26/2017    AST 33 02/28/2024    ALT 29 02/28/2024    ALKPHOS 69 02/28/2024    BILITOTAL 0.6 02/28/2024    INR 0.99 02/28/2024     TTE 5/2023:  Left ventricular function is decreased. The ejection fraction is 10-20% (severely reduced).  Post MitraClip placement in 2017. Mild mitral insufficiency is present.  Global right ventricular function is mildly to moderately reduced.  Moderate tricuspid insufficiency is present. Moderate pulmonary hypertension is present.  IVC diameter <2.1 cm collapsing >50% with sniff suggests a normal RA pressure of 3 mmHg.  No pericardial effusion is present. No significant changes noted.     Device Check 5/24/23:  Device: Medtronic [id number] Visia AF MRI VR  Normal device function.  Mode: VVI 40 bpm  : <0.1%  Intrinsic rhythm: VS 68 bpm  Thoracic Impedance:  Near reference line.   Short V-V intervals: 0  Lead Trends Appear Stable.  Estimated battery longevity to RRT = 6.9 years. Battery voltage = 2.99V.   Atrial Arrhythmia: None  AF Sumpter: 0%  Anticoagulant: None  Ventricular Arrhythmia:  None  Setting Changes: Last shock vector changed to B>AX from AX>B  Patient has an appointment to see Shyanne VENEGAS today.   Plan: Device follow-up every 3 months.    Right Heart catheterization on 11/2023:  Baseline hemodynamics  NIBP 147/87/111  HR 80  Hgb 16.6   RA 7/11/7  RV 69/-/11  PA 68/26/40  PCWP 20/26/20  PA sat 62%  Marvin 2.8/1.7  TD 2.8/1.7  PVR 7.1  After nipride infusion  NIBP 107/58/11  HR 80  CVP 2  PA 45/19/26  PCWP 10/12/10  PA sat 71%  Marvin 3.4/2.1  TD 3/1.8  PVR 4.8     ASSESSMENT AND PLAN:  In summary, patient is a 59 year old with a pmhx of ICM c/b RCA STEMI (s/p POBA RCA, FEMI RCA x7, Lcx. LAD 3/2017) c/b cardiogenic shock s/p IABP, MR s/p mitral clip, HTN, DVT on AC, bilateral hemispheric strokes.  He has not been doing well the past 1 month and a half. He has been feeling cold, dyspnea on minimal exertion, orthopnea and PND. His creatinine is elevated, we added LFTs as an add-on to morning labs. His most recent CI was 1.7 from [facility] in November. He did not feel well and we have arranged for urgent hemodynamic evaluation. However, he got nervous in the waiting area and decided to request to leave and postpone the RHC. We discussed that he should have close follow up however.     Overall doing very well and denies any new complaints or issues.  We discussed to potentially proceed with repeat right heart catheterization if especially if he would feel worse however he notes that he is doing extremely well denies any new complaints he has all the energy and notes that he would want to wait with any further studies at this time.  Also notes that his blood pressure is 100/75 mmHg at home in the morning and as such would like to defer adding new medications.  He requests refills on the current meds and in fact we will increase the spironolactone to 1 tab that is 25 mg daily.  That will be the only change for today.  He understands that we need to monitor him closely and proceed with invasive  hemodynamic evaluation should he get any worse.  We will see him back in 6 months with echo and labs or sooner if needed.     - Chronic systolic heart failure/HFrEF (EF 10-20%) secondary to ischemic cardiomyopathy  - NYHA Symptom Class III-IV,  Stage C/D worsening  - ACE-I/ARB/ARNi: on Entresto to 97 mg twice daily  - BB Metoprolol succinate recently reduced from 200mg to 100mg, decreased to 50mg daily given concerns for CS  - Spironolactone increased to 25mg daily  - SGLT2i: Farxiga 10 mg daily  - SCD prophylaxis ICD in place  - Fluid status Slight hypervolemia  - Diuretic: bumex 0.5 mg daily  - Cardiac Rehab: declined  - Sleep Apnea Evaluation: Not indicated  - Remote monitoring: Encouraged to utilize [employer], coaching offerred  -Advanced therapies: He has quit smoking since 7 years, no recreational drug use.     I appreciate the opportunity to participate in the care of Luke Henao . Please do not hesitate to contact me with any further questions.  I have spent a total of 40 minutes today reviewing labs, imaging studies, discussing with colleagues, face-to-face time with patient and documentation in the medical record.  The longitudinal plan of care for the diagnosis(es)/condition(s) as documented were addressed during this visit. Due to the added complexity in care, I will continue to support Luke in the subsequent management and with ongoing continuity of care.    Sincerely,   Vipin Collier MD  [facility] Division of Cardiology

## 2024-09-24 NOTE — LETTER
9/24/2024      RE: Luke Henao  [address]  [facility] [address]       Dear Colleague,    Thank you for the opportunity to participate in the care of your patient, Luke Henao, at the [facility] [place] [facility]. Please see a copy of my visit note below.    September 24, 2024     I had the pleasure of seeing Luke Henao  in the [facility] Failure [facility]. As you know patient has a complex medical history including ICM c/b RCA STEMI (s/p POBA RCA, FEMI RCA x7, Lcx. LAD 3/2017) c/b cardiogenic shock s/p IABP, MR s/p mitraclip, HTN, DVT on AC, bilateral hemispheric strokes. He presents to Advanced Heart Failure Clinic for follow-up.     The patient has been followed in [facility] for a number of years. He was initially transferred to [facility] on 3/27/2017 with cardiogenic shock after an inferior STEMI. Patient had received a POBA to his RCA at that time, as he was considered for CABG. Initial TTE with LVEF of 30-35%. Underwent coronary angiogram with a FEMI to his RCA, LAD, and Lcx. Further hospital course was complicated by cardiogenic shock c/b IABP. He underwent a Mitraclip for ischemic MR. Underwent ICD implant on 10/2017. His GDMT was titrated over the course of the following years. He had improvement in symptoms, though his LVEF continued to be reduced. Most recently 10-20%.       In 10/2023 he had noted he has some difficulty doing his job and develops lower extremity edema by end of the day. He had a RHC on 11/2023 which results are demonstrated below. Metoprolol was decreased from 200mg daily to 100mg.  At that time we decided to proceed with right heart catheterization given worsening symptoms and significant fatigue.  Patient presented to the Cath Lab for the study however got extremely anxious and ultimately we decided to postpone the procedure.  Today he is here for follow-up.  Notes that he is doing very well in fact  extremely well he has more energy denies any chest pain dizziness lightheadedness or palpitations.  Now he has all the energy and believes that decreasing the metoprolol and adding the spironolactone helped him tremendously.  He would not want any further right heart catheterizations at this time however would like to continue the medication and request refills.  Blood pressure is a little bit more elevated here however knows that it is at home in the morning it was 100/75 mmHg.  No other complaints overall doing well.     Works with hearing aid.   Never miss medications, works well  Lives with wife, house. 20 years remarried, has 4 children with ex-wife in [place]. He moved to [place] 20 years ago. Quit cigarette 7 years ago.      PAST MEDICAL HISTORY:  Past Medical History:   Diagnosis Date     Benign essential hypertension      CAD (coronary artery disease) 2017    RCA STEMI s/p balloon angioplasty and multiple Sofie to RCA, LCx, and LAD     Chronic systolic congestive heart failure (H)      History of deep venous thrombosis 2017    left internal jugular (line-associated); left common femoral; was on coumadin     History of mitral valve repair 2017     History of stroke 2017    no residual deficits     ICD (implantable cardioverter-defibrillator) in place      Type 2 diabetes mellitus (H)      FAMILY HISTORY:  Family History   Problem Relation Age of Onset     Hypertension Mother      Diabetes Type 2  Father      Hypertension Father      Myocardial Infarction No family hx of      Cerebrovascular Disease No family hx of      Coronary Artery Disease Early Onset No family hx of      Colon Cancer No family hx of      Prostate Cancer No family hx of      SOCIAL HISTORY:  Social History     Socioeconomic History     Marital status:    Occupational History     Occupation: [employer]   Tobacco Use     Smoking status: Former     Current packs/day: 0.00     Average packs/day: 0.5 packs/day for 30.0  years (15.0 ttl pk-yrs)     Types: Cigarettes     Start date: 1987     Quit date: 2017     Years since quittin.7     Smokeless tobacco: Never   Vaping Use     Vaping status: Never Used   Substance and Sexual Activity     Alcohol use: No     Drug use: No     Sexual activity: Not Currently   Social History Narrative    . Remarried.    4 children with first marriage.    4 grandchildren.    No formal exercise.      Social Determinants of Health     Interpersonal Safety: Low Risk  (2023)    Interpersonal Safety      Do you feel physically and emotionally safe where you currently live?: Yes      Within the past 12 months, have you been hit, slapped, kicked or otherwise physically hurt by someone?: No      Within the past 12 months, have you been humiliated or emotionally abused in other ways by your partner or ex-partner?: No     CURRENT MEDICATIONS:  Current Outpatient Medications   Medication Sig Dispense Refill     aspirin (ASPIRIN LOW DOSE) 81 MG chewable tablet Take 1 tablet (81 mg) by mouth daily 90 tablet 3     atorvastatin (LIPITOR) 40 MG tablet TAKE 1 TABLET BY MOUTH EVERY DAY 90 tablet 3     bumetanide (BUMEX) 0.5 MG tablet Take 1 tablet (0.5 mg) by mouth daily 90 tablet 3     cetirizine (ZYRTEC) 10 MG tablet TAKE 1 TABLET (10 MG) BY MOUTH DAILY. 90 tablet 0     co-enzyme Q-10 100 MG CAPS capsule Take by mouth daily       dapagliflozin (FARXIGA) 10 MG TABS tablet Take 1 tablet (10 mg) by mouth daily 90 tablet 0     diclofenac (VOLTAREN) 1 % topical gel Apply 2 g topically 4 times daily 150 g 3     JANUVIA 100 MG tablet TAKE 1 TABLET BY MOUTH EVERY DAY 90 tablet 3     metFORMIN (GLUCOPHAGE XR) 500 MG 24 hr tablet TAKE 2 TABLETS BY MOUTH DAILY WITH DINNER 180 tablet 3     metoprolol succinate ER (TOPROL XL) 50 MG 24 hr tablet Take 1 tablet (50 mg) by mouth daily 90 tablet 3     sacubitril-valsartan (ENTRESTO)  MG per tablet Take 1 tablet by mouth 2 times daily 180 tablet 3      spironolactone (ALDACTONE) 25 MG tablet Take 0.5 tablets (12.5 mg) by mouth daily 45 tablet 3     triamcinolone (KENALOG) 0.1 % external cream APPLY TO AFFECTED AREA TWICE A DAY 30 g 0     No current facility-administered medications for this visit.     EXAM:  BP (!) 145/94 (BP Location: Left arm, Patient Position: Chair, Cuff Size: Adult Regular)   Pulse 84   Wt 63.9 kg (140 lb 12.8 oz)   SpO2 99%   BMI 25.75 kg/m    General: appears comfortable, alert and oriented  Head: normocephalic, atraumatic  Eyes: anicteric sclera, EOMI , PERRL  Neck: no adenopathy  Orophyarynx: moist mucosa, no lesions noted  Heart: regular, S1/S2, no murmurs, rubs or gallop. JVP at 12  Lungs: CTAB, No wheezing.   Abdomen: soft, non-tender, slightly distended, bowel sounds present, no hepatosplenomegaly  Extremities: Trace BLE edema  Skin: Cool to ouch  Neuro: grossly non-focal     Labs:  Lab Results   Component Value Date    WBC 10.8 01/23/2024    HGB 17.7 01/23/2024    HCT 55.9 (H) 01/23/2024     01/23/2024     02/28/2024    POTASSIUM 4.9 02/28/2024    CHLORIDE 105 02/28/2024    CO2 22 02/28/2024    BUN 26.2 (H) 02/28/2024    CR 0.97 02/28/2024     (H) 02/28/2024    SED 40 (H) 06/19/2017    NTBNPI 9,984 (H) 04/08/2017    NTBNP 2,113 (H) 01/23/2024    TROPI 0.267 (HH) 04/26/2017    AST 33 02/28/2024    ALT 29 02/28/2024    ALKPHOS 69 02/28/2024    BILITOTAL 0.6 02/28/2024    INR 0.99 02/28/2024     TTE 5/2023:  Left ventricular function is decreased. The ejection fraction is 10-20% (severely reduced).  Post MitraClip placement in 2017. Mild mitral insufficiency is present.  Global right ventricular function is mildly to moderately reduced.  Moderate tricuspid insufficiency is present. Moderate pulmonary hypertension is present.  IVC diameter <2.1 cm collapsing >50% with sniff suggests a normal RA pressure of 3 mmHg.  No pericardial effusion is present. No significant changes noted.     Device Check 5/24/23:  Device:  Medtronic [id number] Visia AF MRI VR  Normal device function.  Mode: VVI 40 bpm  : <0.1%  Intrinsic rhythm: VS 68 bpm  Thoracic Impedance:  Near reference line.   Short V-V intervals: 0  Lead Trends Appear Stable.  Estimated battery longevity to RRT = 6.9 years. Battery voltage = 2.99V.   Atrial Arrhythmia: None  AF [place]: 0%  Anticoagulant: None  Ventricular Arrhythmia: None  Setting Changes: Last shock vector changed to B>AX from AX>B  Patient has an appointment to see Shyanne VENEGAS today.   Plan: Device follow-up every 3 months.    Right Heart catheterization on 11/2023:  Baseline hemodynamics  NIBP 147/87/111  HR 80  Hgb 16.6   RA 7/11/7  RV 69/-/11  PA 68/26/40  PCWP 20/26/20  PA sat 62%  Marvin 2.8/1.7  TD 2.8/1.7  PVR 7.1  After nipride infusion  NIBP 107/58/11  HR 80  CVP 2  PA 45/19/26  PCWP 10/12/10  PA sat 71%  Marvin 3.4/2.1  TD 3/1.8  PVR 4.8     ASSESSMENT AND PLAN:  In summary, patient is a 59 year old with a pmhx of ICM c/b RCA STEMI (s/p POBA RCA, FEMI RCA x7, Lcx. LAD 3/2017) c/b cardiogenic shock s/p IABP, MR s/p mitral clip, HTN, DVT on AC, bilateral hemispheric strokes.  He has not been doing well the past 1 month and a half. He has been feeling cold, dyspnea on minimal exertion, orthopnea and PND. His creatinine is elevated, we added LFTs as an add-on to morning labs. His most recent CI was 1.7 from [facility] in November. He did not feel well and we have arranged for urgent hemodynamic evaluation. However, he got nervous in the waiting area and decided to request to leave and postpone the RHC. We discussed that he should have close follow up however.     Overall doing very well and denies any new complaints or issues.  We discussed to potentially proceed with repeat right heart catheterization if especially if he would feel worse however he notes that he is doing extremely well denies any new complaints he has all the energy and notes that he would want to wait with any further studies at this time.   Also notes that his blood pressure is 100/75 mmHg at home in the morning and as such would like to defer adding new medications.  He requests refills on the current meds and in fact we will increase the spironolactone to 1 tab that is 25 mg daily.  That will be the only change for today.  He understands that we need to monitor him closely and proceed with invasive hemodynamic evaluation should he get any worse.  We will see him back in 6 months with echo and labs or sooner if needed.     - Chronic systolic heart failure/HFrEF (EF 10-20%) secondary to ischemic cardiomyopathy  - NYHA Symptom Class III-IV,  Stage C/D worsening  - ACE-I/ARB/ARNi: on Entresto to 97 mg twice daily  - BB Metoprolol succinate recently reduced from 200mg to 100mg, decreased to 50mg daily given concerns for CS  - Spironolactone increased to 25mg daily  - SGLT2i: Farxiga 10 mg daily  - SCD prophylaxis ICD in place  - Fluid status Slight hypervolemia  - Diuretic: bumex 0.5 mg daily  - Cardiac Rehab: declined  - Sleep Apnea Evaluation: Not indicated  - Remote monitoring: Encouraged to utilize [employer], coaching offerred  -Advanced therapies: He has quit smoking since 7 years, no recreational drug use.     I appreciate the opportunity to participate in the care of Luke Henao . Please do not hesitate to contact me with any further questions.  I have spent a total of 40 minutes today reviewing labs, imaging studies, discussing with colleagues, face-to-face time with patient and documentation in the medical record.  The longitudinal plan of care for the diagnosis(es)/condition(s) as documented were addressed during this visit. Due to the added complexity in care, I will continue to support Luke in the subsequent management and with ongoing continuity of care.    Sincerely,   Vipin Collier MD  [facility] Division of Cardiology         Please do not hesitate to contact me if you have any questions/concerns.     Sincerely,     Vipin Collier,  MD

## 2024-09-24 NOTE — NURSING NOTE
Chief Complaint   Patient presents with    Follow Up     Chronic systolic congestive heart failure (H)       Vitals were taken, medications reconciled.    Johan Gould, Clinic Assistant   8:24 AM

## 2024-09-24 NOTE — PATIENT INSTRUCTIONS
Dr. Collier recommends:    Increase Spironolactone to 25 MG once daily.    Follow up clinic visit with Dr. Collier in 6 months with an echocardiogram and labs the same day.    Thank you for your visit today.  Please call me with any questions or concerns.   Gelnn Sun RN  Cardiology Care Coordinator  [phone]

## 2024-09-25 DIAGNOSIS — I50.22 CHRONIC SYSTOLIC CONGESTIVE HEART FAILURE (H): ICD-10-CM

## 2024-09-25 DIAGNOSIS — E11.59 TYPE 2 DIABETES MELLITUS WITH OTHER CIRCULATORY COMPLICATION, WITHOUT LONG-TERM CURRENT USE OF INSULIN (H): ICD-10-CM

## 2024-09-25 RX ORDER — DAPAGLIFLOZIN 5 MG/1
10 TABLET, FILM COATED ORAL DAILY
Qty: 90 TABLET | Refills: 1 | OUTPATIENT
Start: 2024-09-25

## 2024-09-26 RX ORDER — SPIRONOLACTONE 25 MG/1
12.5 TABLET ORAL DAILY
Qty: 45 TABLET | Refills: 3 | OUTPATIENT
Start: 2024-09-26

## 2024-09-29 DIAGNOSIS — E11.9 TYPE 2 DIABETES MELLITUS WITHOUT COMPLICATION, WITHOUT LONG-TERM CURRENT USE OF INSULIN (H): ICD-10-CM

## 2024-09-30 RX ORDER — SITAGLIPTIN 100 MG/1
100 TABLET, FILM COATED ORAL DAILY
Qty: 90 TABLET | Refills: 3 | Status: SHIPPED | OUTPATIENT
Start: 2024-09-30

## 2024-10-10 NOTE — PROGRESS NOTES
Care Coordinator Progress Note     Admission Date/Time:  3/27/2017  Attending MD:  Poornima Hanley, *     Data  Chart reviewed, discussed with interdisciplinary team.   Patient transferred from [facility] with cardiogenic shock    Concerns with insurance coverage for discharge needs: None.  Current Living Situation: Patient lives with spouse.  Support System: Spouse and Childrens- Supportive and Involved  Services Involved: None.  Transportation: Family or Friend will provide  Barriers to Discharge: chronically ill    Assessment  Pt transferred from [facility] in cardiogenic shock after sustaining an inferior wall STEMI.  Pt is vented, sedated and with IABP.  Visited pt spouse to introduce CC role and for support.  CC tricia discussed and contact info provided.   wasn't available at time of my visit and my visit was short.      Plan  Anticipated Discharge Date:  TBD.  Anticipated Discharge Plan:   TBD.  CC will cont to follow plan of care.      Luis Antonio Woods RN, BSN  4A and 4E/ ICU  Care Coordinator  Phone: [phone]  Pager: [phone]           10-Oct-2024 22:10

## 2024-12-12 ENCOUNTER — ANCILLARY PROCEDURE (OUTPATIENT)
Dept: CARDIOLOGY | Facility: CLINIC | Age: 59
End: 2024-12-12
Attending: INTERNAL MEDICINE
Payer: COMMERCIAL

## 2024-12-12 DIAGNOSIS — I42.9 CARDIOMYOPATHY (H): ICD-10-CM

## 2024-12-12 PROCEDURE — 93296 REM INTERROG EVL PM/IDS: CPT

## 2024-12-28 LAB
MDC_IDC_LEAD_CONNECTION_STATUS: NORMAL
MDC_IDC_LEAD_IMPLANT_DT: NORMAL
MDC_IDC_LEAD_LOCATION: NORMAL
MDC_IDC_LEAD_LOCATION_DETAIL_1: NORMAL
MDC_IDC_LEAD_MFG: NORMAL
MDC_IDC_LEAD_MODEL: NORMAL
MDC_IDC_LEAD_POLARITY_TYPE: NORMAL
MDC_IDC_LEAD_SERIAL: NORMAL
MDC_IDC_LEAD_SPECIAL_FUNCTION: NORMAL
MDC_IDC_MSMT_BATTERY_DTM: NORMAL
MDC_IDC_MSMT_BATTERY_REMAINING_LONGEVITY: 54 MO
MDC_IDC_MSMT_BATTERY_RRT_TRIGGER: 2.73
MDC_IDC_MSMT_BATTERY_STATUS: NORMAL
MDC_IDC_MSMT_BATTERY_VOLTAGE: 2.99 V
MDC_IDC_MSMT_LEADCHNL_RV_IMPEDANCE_VALUE: 266 OHM
MDC_IDC_MSMT_LEADCHNL_RV_IMPEDANCE_VALUE: 323 OHM
MDC_IDC_MSMT_LEADCHNL_RV_PACING_THRESHOLD_AMPLITUDE: 0.5 V
MDC_IDC_MSMT_LEADCHNL_RV_PACING_THRESHOLD_PULSEWIDTH: 0.4 MS
MDC_IDC_MSMT_LEADCHNL_RV_SENSING_INTR_AMPL: 6.12 MV
MDC_IDC_MSMT_LEADCHNL_RV_SENSING_INTR_AMPL: 6.12 MV
MDC_IDC_PG_IMPLANT_DTM: NORMAL
MDC_IDC_PG_MFG: NORMAL
MDC_IDC_PG_MODEL: NORMAL
MDC_IDC_PG_SERIAL: NORMAL
MDC_IDC_PG_TYPE: NORMAL
MDC_IDC_SESS_CLINIC_NAME: NORMAL
MDC_IDC_SESS_DTM: NORMAL
MDC_IDC_SESS_TYPE: NORMAL
MDC_IDC_SET_BRADY_HYSTRATE: NORMAL
MDC_IDC_SET_BRADY_LOWRATE: 40 {BEATS}/MIN
MDC_IDC_SET_BRADY_MODE: NORMAL
MDC_IDC_SET_LEADCHNL_RV_PACING_AMPLITUDE: 2 V
MDC_IDC_SET_LEADCHNL_RV_PACING_ANODE_ELECTRODE_1: NORMAL
MDC_IDC_SET_LEADCHNL_RV_PACING_ANODE_LOCATION_1: NORMAL
MDC_IDC_SET_LEADCHNL_RV_PACING_CAPTURE_MODE: NORMAL
MDC_IDC_SET_LEADCHNL_RV_PACING_CATHODE_ELECTRODE_1: NORMAL
MDC_IDC_SET_LEADCHNL_RV_PACING_CATHODE_LOCATION_1: NORMAL
MDC_IDC_SET_LEADCHNL_RV_PACING_POLARITY: NORMAL
MDC_IDC_SET_LEADCHNL_RV_PACING_PULSEWIDTH: 0.4 MS
MDC_IDC_SET_LEADCHNL_RV_SENSING_ANODE_ELECTRODE_1: NORMAL
MDC_IDC_SET_LEADCHNL_RV_SENSING_ANODE_LOCATION_1: NORMAL
MDC_IDC_SET_LEADCHNL_RV_SENSING_CATHODE_ELECTRODE_1: NORMAL
MDC_IDC_SET_LEADCHNL_RV_SENSING_CATHODE_LOCATION_1: NORMAL
MDC_IDC_SET_LEADCHNL_RV_SENSING_POLARITY: NORMAL
MDC_IDC_SET_LEADCHNL_RV_SENSING_SENSITIVITY: 0.3 MV
MDC_IDC_SET_ZONE_DETECTION_BEATS_DENOMINATOR: 16 {BEATS}
MDC_IDC_SET_ZONE_DETECTION_BEATS_DENOMINATOR: 32 {BEATS}
MDC_IDC_SET_ZONE_DETECTION_BEATS_DENOMINATOR: 40 {BEATS}
MDC_IDC_SET_ZONE_DETECTION_BEATS_NUMERATOR: 16 {BEATS}
MDC_IDC_SET_ZONE_DETECTION_BEATS_NUMERATOR: 30 {BEATS}
MDC_IDC_SET_ZONE_DETECTION_BEATS_NUMERATOR: 32 {BEATS}
MDC_IDC_SET_ZONE_DETECTION_INTERVAL: 300 MS
MDC_IDC_SET_ZONE_DETECTION_INTERVAL: 360 MS
MDC_IDC_SET_ZONE_DETECTION_INTERVAL: 390 MS
MDC_IDC_SET_ZONE_DETECTION_INTERVAL: NORMAL
MDC_IDC_SET_ZONE_STATUS: NORMAL
MDC_IDC_SET_ZONE_TYPE: NORMAL
MDC_IDC_SET_ZONE_VENDOR_TYPE: NORMAL
MDC_IDC_STAT_AT_BURDEN_PERCENT: 0 %
MDC_IDC_STAT_AT_DTM_END: NORMAL
MDC_IDC_STAT_AT_DTM_START: NORMAL
MDC_IDC_STAT_BRADY_DTM_END: NORMAL
MDC_IDC_STAT_BRADY_DTM_START: NORMAL
MDC_IDC_STAT_BRADY_RV_PERCENT_PACED: 0.01 %
MDC_IDC_STAT_EPISODE_RECENT_COUNT: 0
MDC_IDC_STAT_EPISODE_RECENT_COUNT_DTM_END: NORMAL
MDC_IDC_STAT_EPISODE_RECENT_COUNT_DTM_START: NORMAL
MDC_IDC_STAT_EPISODE_TOTAL_COUNT: 0
MDC_IDC_STAT_EPISODE_TOTAL_COUNT_DTM_END: NORMAL
MDC_IDC_STAT_EPISODE_TOTAL_COUNT_DTM_START: NORMAL
MDC_IDC_STAT_EPISODE_TYPE: NORMAL
MDC_IDC_STAT_TACHYTHERAPY_ATP_DELIVERED_RECENT: 0
MDC_IDC_STAT_TACHYTHERAPY_ATP_DELIVERED_TOTAL: 0
MDC_IDC_STAT_TACHYTHERAPY_RECENT_DTM_END: NORMAL
MDC_IDC_STAT_TACHYTHERAPY_RECENT_DTM_START: NORMAL
MDC_IDC_STAT_TACHYTHERAPY_SHOCKS_ABORTED_RECENT: 0
MDC_IDC_STAT_TACHYTHERAPY_SHOCKS_ABORTED_TOTAL: 0
MDC_IDC_STAT_TACHYTHERAPY_SHOCKS_DELIVERED_RECENT: 0
MDC_IDC_STAT_TACHYTHERAPY_SHOCKS_DELIVERED_TOTAL: 0
MDC_IDC_STAT_TACHYTHERAPY_TOTAL_DTM_END: NORMAL
MDC_IDC_STAT_TACHYTHERAPY_TOTAL_DTM_START: NORMAL

## 2025-01-15 ENCOUNTER — TELEPHONE (OUTPATIENT)
Dept: CARDIOLOGY | Facility: CLINIC | Age: 60
End: 2025-01-15
Payer: COMMERCIAL

## 2025-01-27 ENCOUNTER — OFFICE VISIT (OUTPATIENT)
Dept: INTERNAL MEDICINE | Facility: CLINIC | Age: 60
End: 2025-01-27
Payer: COMMERCIAL

## 2025-01-27 VITALS
BODY MASS INDEX: 25.06 KG/M2 | SYSTOLIC BLOOD PRESSURE: 142 MMHG | TEMPERATURE: 97.6 F | OXYGEN SATURATION: 98 % | HEIGHT: 62 IN | WEIGHT: 136.2 LBS | HEART RATE: 70 BPM | DIASTOLIC BLOOD PRESSURE: 86 MMHG

## 2025-01-27 DIAGNOSIS — Z12.5 SCREENING FOR PROSTATE CANCER: ICD-10-CM

## 2025-01-27 DIAGNOSIS — I25.10 CORONARY ARTERY DISEASE INVOLVING NATIVE CORONARY ARTERY OF NATIVE HEART WITHOUT ANGINA PECTORIS: ICD-10-CM

## 2025-01-27 DIAGNOSIS — I50.22 CHRONIC SYSTOLIC CONGESTIVE HEART FAILURE (H): ICD-10-CM

## 2025-01-27 DIAGNOSIS — Z86.73 HISTORY OF STROKE: ICD-10-CM

## 2025-01-27 DIAGNOSIS — Z98.890 HISTORY OF MITRAL VALVE REPAIR: ICD-10-CM

## 2025-01-27 DIAGNOSIS — Z95.810 ICD (IMPLANTABLE CARDIOVERTER-DEFIBRILLATOR) IN PLACE: ICD-10-CM

## 2025-01-27 DIAGNOSIS — R09.81 SINUS CONGESTION: ICD-10-CM

## 2025-01-27 DIAGNOSIS — I10 BENIGN ESSENTIAL HYPERTENSION: ICD-10-CM

## 2025-01-27 DIAGNOSIS — E11.9 TYPE 2 DIABETES MELLITUS WITHOUT COMPLICATION, WITHOUT LONG-TERM CURRENT USE OF INSULIN (H): ICD-10-CM

## 2025-01-27 DIAGNOSIS — Z86.718 HISTORY OF DEEP VENOUS THROMBOSIS: ICD-10-CM

## 2025-01-27 DIAGNOSIS — Z00.00 ROUTINE HISTORY AND PHYSICAL EXAMINATION OF ADULT: Primary | ICD-10-CM

## 2025-01-27 LAB
ALBUMIN SERPL BCG-MCNC: 4.4 G/DL (ref 3.5–5.2)
ALP SERPL-CCNC: 50 U/L (ref 40–150)
ALT SERPL W P-5'-P-CCNC: 10 U/L (ref 0–70)
ANION GAP SERPL CALCULATED.3IONS-SCNC: 12 MMOL/L (ref 7–15)
AST SERPL W P-5'-P-CCNC: 19 U/L (ref 0–45)
BILIRUB SERPL-MCNC: 0.8 MG/DL
BUN SERPL-MCNC: 33.2 MG/DL (ref 6–20)
CALCIUM SERPL-MCNC: 9.6 MG/DL (ref 8.8–10.4)
CHLORIDE SERPL-SCNC: 103 MMOL/L (ref 98–107)
CHOLEST SERPL-MCNC: 140 MG/DL
CREAT SERPL-MCNC: 1.46 MG/DL (ref 0.67–1.17)
EGFRCR SERPLBLD CKD-EPI 2021: 56 ML/MIN/1.73M2
ERYTHROCYTE [DISTWIDTH] IN BLOOD BY AUTOMATED COUNT: 12.3 % (ref 10–15)
EST. AVERAGE GLUCOSE BLD GHB EST-MCNC: 137 MG/DL
FASTING STATUS PATIENT QL REPORTED: YES
FASTING STATUS PATIENT QL REPORTED: YES
GLUCOSE SERPL-MCNC: 144 MG/DL (ref 70–99)
HBA1C MFR BLD: 6.4 % (ref 0–5.6)
HCO3 SERPL-SCNC: 23 MMOL/L (ref 22–29)
HCT VFR BLD AUTO: 46.8 % (ref 40–53)
HDLC SERPL-MCNC: 42 MG/DL
HGB BLD-MCNC: 15.4 G/DL (ref 13.3–17.7)
LDLC SERPL CALC-MCNC: 73 MG/DL
MCH RBC QN AUTO: 31.2 PG (ref 26.5–33)
MCHC RBC AUTO-ENTMCNC: 32.9 G/DL (ref 31.5–36.5)
MCV RBC AUTO: 95 FL (ref 78–100)
NONHDLC SERPL-MCNC: 98 MG/DL
PLATELET # BLD AUTO: 202 10E3/UL (ref 150–450)
POTASSIUM SERPL-SCNC: 5.3 MMOL/L (ref 3.4–5.3)
PROT SERPL-MCNC: 8.2 G/DL (ref 6.4–8.3)
PSA SERPL DL<=0.01 NG/ML-MCNC: 0.59 NG/ML (ref 0–3.5)
RBC # BLD AUTO: 4.94 10E6/UL (ref 4.4–5.9)
SODIUM SERPL-SCNC: 138 MMOL/L (ref 135–145)
TRIGL SERPL-MCNC: 124 MG/DL
WBC # BLD AUTO: 11.3 10E3/UL (ref 4–11)

## 2025-01-27 PROCEDURE — 80061 LIPID PANEL: CPT | Performed by: INTERNAL MEDICINE

## 2025-01-27 PROCEDURE — 99214 OFFICE O/P EST MOD 30 MIN: CPT | Mod: 25 | Performed by: INTERNAL MEDICINE

## 2025-01-27 PROCEDURE — 99207 PR FOOT EXAM NO CHARGE: CPT | Mod: 25 | Performed by: INTERNAL MEDICINE

## 2025-01-27 PROCEDURE — 80053 COMPREHEN METABOLIC PANEL: CPT | Performed by: INTERNAL MEDICINE

## 2025-01-27 PROCEDURE — 83036 HEMOGLOBIN GLYCOSYLATED A1C: CPT | Performed by: INTERNAL MEDICINE

## 2025-01-27 PROCEDURE — 36415 COLL VENOUS BLD VENIPUNCTURE: CPT | Performed by: INTERNAL MEDICINE

## 2025-01-27 PROCEDURE — G0103 PSA SCREENING: HCPCS | Performed by: INTERNAL MEDICINE

## 2025-01-27 PROCEDURE — 85027 COMPLETE CBC AUTOMATED: CPT | Performed by: INTERNAL MEDICINE

## 2025-01-27 PROCEDURE — 99396 PREV VISIT EST AGE 40-64: CPT | Performed by: INTERNAL MEDICINE

## 2025-01-27 RX ORDER — FLUTICASONE PROPIONATE 50 MCG
2 SPRAY, SUSPENSION (ML) NASAL DAILY
Qty: 16 G | Refills: 0 | Status: SHIPPED | OUTPATIENT
Start: 2025-01-27

## 2025-01-27 SDOH — HEALTH STABILITY: PHYSICAL HEALTH: ON AVERAGE, HOW MANY DAYS PER WEEK DO YOU ENGAGE IN MODERATE TO STRENUOUS EXERCISE (LIKE A BRISK WALK)?: 7 DAYS

## 2025-01-27 SDOH — HEALTH STABILITY: PHYSICAL HEALTH: ON AVERAGE, HOW MANY MINUTES DO YOU ENGAGE IN EXERCISE AT THIS LEVEL?: 50 MIN

## 2025-01-27 ASSESSMENT — SOCIAL DETERMINANTS OF HEALTH (SDOH): HOW OFTEN DO YOU GET TOGETHER WITH FRIENDS OR RELATIVES?: TWICE A WEEK

## 2025-01-27 NOTE — LETTER
January 27, 2025      Luke Fischeri  [address]  [facility] [address]        Dear ,    We are writing to inform you of your test results.    Your test results fall within or near the expected ranges.  Please continue current medications and let's plan on diabetes follow-up in 6 months please.     Resulted Orders   CBC with platelets   Result Value Ref Range    WBC Count 11.3 (H) 4.0 - 11.0 10e3/uL    RBC Count 4.94 4.40 - 5.90 10e6/uL    Hemoglobin 15.4 13.3 - 17.7 g/dL    Hematocrit 46.8 40.0 - 53.0 %    MCV 95 78 - 100 fL    MCH 31.2 26.5 - 33.0 pg    MCHC 32.9 31.5 - 36.5 g/dL    RDW 12.3 10.0 - 15.0 %    Platelet Count 202 150 - 450 10e3/uL   Comprehensive metabolic panel   Result Value Ref Range    Sodium 138 135 - 145 mmol/L    Potassium 5.3 3.4 - 5.3 mmol/L    Carbon Dioxide (CO2) 23 22 - 29 mmol/L    Anion Gap 12 7 - 15 mmol/L    Urea Nitrogen 33.2 (H) 6.0 - 20.0 mg/dL    Creatinine 1.46 (H) 0.67 - 1.17 mg/dL    GFR Estimate 56 (L) >60 mL/min/1.73m2    Calcium 9.6 8.8 - 10.4 mg/dL    Chloride 103 98 - 107 mmol/L    Glucose 144 (H) 70 - 99 mg/dL    Alkaline Phosphatase 50 40 - 150 U/L    AST 19 0 - 45 U/L    ALT 10 0 - 70 U/L    Protein Total 8.2 6.4 - 8.3 g/dL    Albumin 4.4 3.5 - 5.2 g/dL    Bilirubin Total 0.8 <=1.2 mg/dL    Patient Fasting > 8hrs? Yes    Lipid Profile   Result Value Ref Range    Cholesterol 140 <200 mg/dL    Triglycerides 124 <150 mg/dL    Direct Measure HDL 42 >=40 mg/dL    LDL Cholesterol Calculated 73 <100 mg/dL    Non HDL Cholesterol 98 <130 mg/dL   Hemoglobin A1c   Result Value Ref Range    Estimated Average Glucose 137 (H) <117 mg/dL    Hemoglobin A1C 6.4 (H) 0.0 - 5.6 %   Prostate Specific Antigen Screen   Result Value Ref Range    Prostate Specific Antigen Screen 0.59 0.00 - 3.50 ng/mL     If you have any questions or concerns, please call the clinic at the number listed above.       Sincerely,      Shanna Church MD    Electronically signed

## 2025-01-27 NOTE — PROGRESS NOTES
ASSESSMENT/PLAN                                                       (Z00.00) Routine history and physical examination of adult  (primary encounter diagnosis)  Comment: PMH, PSH, FH, SH, medications, allergies, immunizations, and preventative health measures reviewed and updated as appropriate.  Plan: see below for plans.      (I50.22) Chronic systolic congestive heart failure (H)  Comment: currently compensated on optimal medical therapy; followed by cardiology.     (Z86.718) History of deep venous thrombosis  Comment: 2017; left internal jugular (line-associated); left common femoral; was on coumadin.    (I10) Benign essential hypertension  Comment: well-controlled on Entreto, Aldactone, and Toprol XL); followed by cardiology.     (Z86.73) History of stroke  Comment: 2017; no residual deficits.    (E11.9) Type 2 diabetes mellitus without complication, without long-term current use of insulin (H)  Plan: fasting diabetes labs today; recommendations to follow.     (Z98.890) History of mitral valve repair with mitral clip 2017  Comment: known issue that I take into account for their medical decisions, no current exacerbations or new concerns.    (Z95.810) ICD (implantable cardioverter-defibrillator) in place- [employer], single chamber- NOT dependent  Comment: known issue that I take into account for their medical decisions, no current exacerbations or new concerns.    (I25.10) Coronary artery disease involving native coronary artery of native heart without angina pectoris  Comment: RCA STEMI s/p balloon angioplasty and multiple Sofie to RCA, LCx, and LAD in 2017; on optimal medical management; followed by cardiology.     (Z12.5) Screening for prostate cancer  Plan: screening PSA with above labs.     (R09.81) Sinus congestion  Comment: well-controlled on Flonase.   Plan: continue present management; refills provided.     Shanna Church MD   [facility]  [address] [place] [address]  T:  [phone], F: [phone]    SUBJECTIVE                                                      Luke Henao is a very pleasant 59 year old male who presents for a physical.    Maltese video  utilized.     ROS:  Constitutional: no unintentional weight loss or gain reported; no fevers, chills, or sweats  reported    Cardiovascular: no chest pain, palpitations, or edema reported  Respiratory: no cough, wheezing, shortness of breath, or dyspnea on exertion reported  Gastrointestinal: no nausea, vomiting, constipation, diarrhea, or abdominal pain reported  Genitourinary: no urinary frequency, urgency, dysuria, or hematuria reported  Integumentary: no rash or pruritus reported  Musculoskeletal: no back pain, muscle pain, joint pain, or joint swelling reported  Neurologic: no focal weakness, numbness, or tingling reported  Hematologic: no easy bruising or bleeding reported  Endocrine: no heat or cold intolerance reported; no polyuria or polydipsia reported  Psychiatric: no anxiety or depression reported    Past Medical History:   Diagnosis Date    Benign essential hypertension     CAD (coronary artery disease) 2017    RCA STEMI s/p balloon angioplasty and multiple Sofie to RCA, LCx, and LAD    Chronic systolic congestive heart failure (H)     History of deep venous thrombosis 2017    left internal jugular (line-associated); left common femoral; was on coumadin    History of mitral valve repair 2017    History of stroke 2017    no residual deficits    ICD (implantable cardioverter-defibrillator) in place     Type 2 diabetes mellitus (H)      Past Surgical History:   Procedure Laterality Date    CATARACT EXTRACTION, BILATERAL  2021    COLONOSCOPY N/A 04/17/2017    Procedure: COLONOSCOPY;  Surgeon: Rashaad Bundy MD;  Location:  GI    CV RIGHT HEART CATH MEASUREMENTS RECORDED N/A 11/8/2023    Procedure: Heart Cath Right Heart Cath;  Surgeon: Jamison Swift MD;  Location:  HEART CARDIAC CATH LAB     INSERT INTRAAORTIC BALLOON PUMP Right 04/19/2017    Procedure: INSERT INTRAAORTIC BALLOON PUMP;  Right Subclavian Intra Aortic Balloon Pump Insertion using Maquet 40cc Ballon Catheter, Implentation of 8mm Gelweave Woven Vascular Prosthesis, Removal of Left Femoral Ballon Pump Catheter, Flouroscopy;  Surgeon: Keshav Leung MD;  Location: [facility] OR    PERCUTANEOUS MITRAL VALVE REPAIR N/A 05/01/2017    Procedure: PERCUTANEOUS MITRAL VALVE REPAIR ANESTHESIA;  Mitraclip Procedure Possible Cardiopulmonary Bypass ;  Surgeon: Ron Cortez MD;  Location: [facility] OR    SUBCLAVIAN AORTIC VALVE IMPLANT N/A 05/08/2017    Procedure: SUBCLAVIAN AORTIC VALVE IMPLANT;  Right Subclavian Graft Removal ;  Surgeon: Keshav Leung MD;  Location:  OR    ZZC INSERT ELECTRD LEADS/REPOSTION  10/27/2017          Family History   Problem Relation Age of Onset    Hypertension Mother     Diabetes Type 2  Father     Hypertension Father     Myocardial Infarction No family hx of     Cerebrovascular Disease No family hx of     Coronary Artery Disease Early Onset No family hx of     Colon Cancer No family hx of     Prostate Cancer No family hx of      Social History     Occupational History    Occupation: [employer]   Tobacco Use    Smoking status: Former     Types: Cigarettes    Smokeless tobacco: Never    Tobacco comments:     Quit in 2017; smoked for 30 years; 0.5ppd at his most.    Vaping Use    Vaping status: Never Used   Substance and Sexual Activity    Alcohol use: No    Drug use: No    Sexual activity: Not Currently   Social History Narrative    . Remarried.    4 children with first marriage.    5 grandchildren.    No formal exercise.      Allergies   Allergen Reactions    Lidocan [Lidocaine] Rash     Current Outpatient Medications   Medication Sig    aspirin (ASPIRIN LOW DOSE) 81 MG chewable tablet Take 1 tablet (81 mg) by mouth daily.    atorvastatin (LIPITOR) 40 MG tablet Take 1 tablet (40 mg)  by mouth daily.    bumetanide (BUMEX) 0.5 MG tablet Take 1 tablet (0.5 mg) by mouth daily.    cetirizine (ZYRTEC) 10 MG tablet TAKE 1 TABLET (10 MG) BY MOUTH DAILY.    co-enzyme Q-10 100 MG CAPS capsule Take by mouth daily    dapagliflozin (FARXIGA) 10 MG TABS tablet Take 1 tablet (10 mg) by mouth daily.    diclofenac (VOLTAREN) 1 % topical gel Apply 2 g topically 4 times daily    fluticasone (FLONASE) 50 MCG/ACT nasal spray Spray 2 sprays into both nostrils daily.    JANUVIA 100 MG tablet TAKE 1 TABLET BY MOUTH EVERY DAY    metFORMIN (GLUCOPHAGE XR) 500 MG 24 hr tablet TAKE 2 TABLETS BY MOUTH DAILY WITH DINNER    metoprolol succinate ER (TOPROL XL) 50 MG 24 hr tablet Take 1 tablet (50 mg) by mouth daily.    sacubitril-valsartan (ENTRESTO)  MG per tablet Take 1 tablet by mouth 2 times daily.    spironolactone (ALDACTONE) 25 MG tablet Take 1 tablet (25 mg) by mouth daily.    triamcinolone (KENALOG) 0.1 % external cream APPLY TO AFFECTED AREA TWICE A DAY     Immunization History   Administered Date(s) Administered    COVID-19 Monovalent 18+ (Moderna) 05/04/2021, 06/01/2021, 12/29/2021    Influenza Vaccine 18-64 (Flublok) 10/12/2020, 10/10/2022    Influenza Vaccine, 6+MO IM (QUADRIVALENT W/PRESERVATIVES) 10/01/2018    Pneumococcal 23 valent 05/14/2017    TDAP Vaccine (Adacel) 09/26/2017    Zoster recombinant adjuvanted (SHINGRIX) 11/28/2024     PREVENTATIVE HEALTH                                                      BMI: within normal limits   Blood pressure: well-controlled on current regimen   Prostate CA Screening: DUE  Colon CA screening: DUE - patient declines  Lung CA screening: patient does not meet screening criteria  AAA screening: patient does not meet screening criteria  Screening cholesterol: n/a - already being treated for this condition  Screening diabetes: n/a - already being treated for this condition  STD testing: no risk factors present  Alcohol misuse screening: alcohol use reviewed - no  "intervention indicated at this time  Immunizations: reviewed;  Prevnar 20 DUE - patient declines and COVID-19 booster and flu shot DUE - previously declined (not discussed at today's visit)    OBJECTIVE                                                      BP (!) 142/86   Pulse 70   Temp 97.6  F (36.4  C) (Temporal)   Ht 1.575 m (5' 2\")   Wt 61.8 kg (136 lb 3.2 oz)   SpO2 98%   BMI 24.91 kg/m    Constitutional: well-appearing  Head, Ears, and Eyes: normocephalic; normal external auditory canal and pinna; tympanic membranes visualized and normal; normal lids and conjunctivae  Neck: supple, symmetric, no thyromegaly or lymphadenopathy  Respiratory: normal respiratory effort; clear to auscultation bilaterally  Cardiovascular: regular rate and rhythm; no edema  Gastrointestinal: soft, non-tender, and non-distended; no organomegaly or masses  Musculoskeletal: normal gait and station  Foot exam: feet intact - no lesions or ulcers; sensation intact to monofilament  Psych: normal judgment and insight; normal mood and affect; recent and remote memory intact    ---    (Note was completed, in part, with [employer] voice-recognition software. Documentation was reviewed, but some grammatical, spelling, and word errors may remain.)    "

## 2025-02-22 DIAGNOSIS — R09.81 SINUS CONGESTION: ICD-10-CM

## 2025-02-22 DIAGNOSIS — E11.9 TYPE 2 DIABETES MELLITUS WITHOUT COMPLICATION, WITHOUT LONG-TERM CURRENT USE OF INSULIN (H): ICD-10-CM

## 2025-02-24 RX ORDER — FLUTICASONE PROPIONATE 50 MCG
2 SPRAY, SUSPENSION (ML) NASAL DAILY
Qty: 48 ML | Refills: 0 | Status: SHIPPED | OUTPATIENT
Start: 2025-02-24

## 2025-02-24 RX ORDER — METFORMIN HYDROCHLORIDE 500 MG/1
1000 TABLET, EXTENDED RELEASE ORAL
Qty: 180 TABLET | Refills: 3 | Status: SHIPPED | OUTPATIENT
Start: 2025-02-24

## 2025-04-21 NOTE — PROGRESS NOTES
April 22, 2025    I had the pleasure of seeing Luke Henao  in the [facility] Advanced Heart Failure Clinic. As you know patient has a complex medical history including ICM c/b RCA STEMI (s/p POBA RCA, FEMI RCA x7, Lcx. LAD 3/2017) c/b cardiogenic shock s/p IABP, MR s/p mitraclip, HTN, DVT on AC, bilateral hemispheric strokes. He presents to Advanced Heart Failure Clinic for follow-up.     The patient has been followed in [facility] clinic for a number of years. He was initially transferred to [facility] on 3/27/2017 with cardiogenic shock after an inferior STEMI. Patient had received a POBA to his RCA at that time, as he was considered for CABG. Initial TTE with LVEF of 30-35%. Underwent coronary angiogram with a FEMI to his RCA, LAD, and Lcx. Further hospital course was complicated by cardiogenic shock c/b IABP. He underwent a Mitraclip for ischemic MR. Underwent ICD implant on 10/2017. His GDMT was titrated over the course of the following years. He had improvement in symptoms, though his LVEF continued to be reduced. Most recently 10-20%.       In 10/2023 he had noted he has some difficulty doing his job and develops lower extremity edema by end of the day. He had a RHC on 11/2023 which results are demonstrated below. Metoprolol was decreased from 200mg daily to 100mg.  At that time we decided to proceed with right heart catheterization given worsening symptoms and significant fatigue.  Patient presented to the Cath Lab for the study however got extremely anxious and ultimately we decided to postpone the procedure.  Today he is here for follow-up.      Overall he notes that he is feeling okay he did stop the Bumex because he was urinating too much from it.  He takes all other medications and he confirms this.  Denies any dizziness lightheadedness or palpitations.  He denies also lower extremity edema but on exam he does have some as below.  He again notes that he is feeling relatively well    Works with hearing aid.   Never miss  medications, works well  Lives with wife, house. 20 years remarried, has 4 children with ex-wife in [place]. He moved to [place] 20 years ago. Quit cigarette 7 years ago.      PAST MEDICAL HISTORY:  Past Medical History:   Diagnosis Date    Benign essential hypertension     CAD (coronary artery disease) 2017    RCA STEMI s/p balloon angioplasty and multiple Sofie to RCA, LCx, and LAD    Chronic systolic congestive heart failure (H)     History of deep venous thrombosis 2017    left internal jugular (line-associated); left common femoral; was on coumadin    History of mitral valve repair 2017    History of stroke 2017    no residual deficits    ICD (implantable cardioverter-defibrillator) in place     Type 2 diabetes mellitus (H)      FAMILY HISTORY:  Family History   Problem Relation Age of Onset    Hypertension Mother     Diabetes Type 2  Father     Hypertension Father     Myocardial Infarction No family hx of     Cerebrovascular Disease No family hx of     Coronary Artery Disease Early Onset No family hx of     Colon Cancer No family hx of     Prostate Cancer No family hx of      SOCIAL HISTORY:  Social History     Socioeconomic History    Marital status:    Occupational History    Occupation: [employer]   Tobacco Use    Smoking status: Former     Types: Cigarettes    Smokeless tobacco: Never    Tobacco comments:     Quit in 2017; smoked for 30 years; 0.5ppd at his most.    Vaping Use    Vaping status: Never Used   Substance and Sexual Activity    Alcohol use: No    Drug use: No    Sexual activity: Not Currently   Social History Narrative    . Remarried.    4 children with first marriage.    5 grandchildren.    No formal exercise.      Social Drivers of Health     Financial Resource Strain: Unknown (1/27/2025)    Financial Resource Strain     Within the past 12 months, have you or your family members you live with been unable to get utilities (heat, electricity) when it was really  needed?: Patient declined   Food Insecurity: Unknown (1/27/2025)    Food Insecurity     Within the past 12 months, did you worry that your food would run out before you got money to buy more?: Patient declined     Within the past 12 months, did the food you bought just not last and you didn t have money to get more?: Patient declined   Transportation Needs: Unknown (1/27/2025)    Transportation Needs     Within the past 12 months, has lack of transportation kept you from medical appointments, getting your medicines, non-medical meetings or appointments, work, or from getting things that you need?: Patient declined   Physical Activity: Sufficiently Active (1/27/2025)    Exercise Vital Sign     Days of Exercise per Week: 7 days     Minutes of Exercise per Session: 50 min   Stress: No Stress Concern Present (1/27/2025)    [employer] - Occupational Stress Questionnaire     Feeling of Stress : Not at all   Social Connections: Unknown (1/27/2025)    Social Connection and Isolation Panel [NHANES]     Frequency of Social Gatherings with Friends and Family: Twice a week   Interpersonal Safety: Low Risk  (12/13/2023)    Interpersonal Safety     Do you feel physically and emotionally safe where you currently live?: Yes     Within the past 12 months, have you been hit, slapped, kicked or otherwise physically hurt by someone?: No     Within the past 12 months, have you been humiliated or emotionally abused in other ways by your partner or ex-partner?: No   Housing Stability: Unknown (1/27/2025)    Housing Stability     Do you have housing? : Patient declined     Are you worried about losing your housing?: Patient declined     CURRENT MEDICATIONS:  Current Outpatient Medications   Medication Sig Dispense Refill    aspirin (ASPIRIN LOW DOSE) 81 MG chewable tablet Take 1 tablet (81 mg) by mouth daily. 90 tablet 3    atorvastatin (LIPITOR) 40 MG tablet Take 1 tablet (40 mg) by mouth daily. 90 tablet 3     bumetanide (BUMEX) 0.5 MG tablet Take 1 tablet (0.5 mg) by mouth daily. 90 tablet 3    cetirizine (ZYRTEC) 10 MG tablet TAKE 1 TABLET (10 MG) BY MOUTH DAILY. 90 tablet 0    co-enzyme Q-10 100 MG CAPS capsule Take by mouth daily      dapagliflozin (FARXIGA) 10 MG TABS tablet Take 1 tablet (10 mg) by mouth daily. 90 tablet 3    diclofenac (VOLTAREN) 1 % topical gel Apply 2 g topically 4 times daily 150 g 3    fluticasone (FLONASE) 50 MCG/ACT nasal spray INSTILL 2 SPRAYS INTO BOTH NOSTRILS DAILY 48 mL 0    JANUVIA 100 MG tablet TAKE 1 TABLET BY MOUTH EVERY DAY 90 tablet 3    metFORMIN (GLUCOPHAGE XR) 500 MG 24 hr tablet TAKE 2 TABLETS BY MOUTH DAILY WITH DINNER 180 tablet 3    metoprolol succinate ER (TOPROL XL) 50 MG 24 hr tablet Take 1 tablet (50 mg) by mouth daily. 90 tablet 3    sacubitril-valsartan (ENTRESTO)  MG per tablet Take 1 tablet by mouth 2 times daily. 180 tablet 3    spironolactone (ALDACTONE) 25 MG tablet Take 1 tablet (25 mg) by mouth daily. 90 tablet 3    triamcinolone (KENALOG) 0.1 % external cream APPLY TO AFFECTED AREA TWICE A DAY 30 g 0     No current facility-administered medications for this visit.     EXAM:  /79 (BP Location: Left arm, Patient Position: Chair, Cuff Size: Adult Regular)   Pulse 72   Wt 62.4 kg (137 lb 9.6 oz)   SpO2 99%   BMI 25.17 kg/m    General: appears comfortable, alert and oriented  Head: normocephalic, atraumatic  Eyes: anicteric sclera, EOMI , PERRL  Neck: no adenopathy  Orophyarynx: moist mucosa, no lesions noted  Heart: regular, S1/S2, no murmurs, rubs or gallop. JVP at 12  Lungs: CTAB, No wheezing.   Abdomen: soft, non-tender, slightly distended, bowel sounds present, no hepatosplenomegaly  Extremities: Trace BLE edema  Skin: Cool to ouch  Neuro: grossly non-focal     Labs:  Lab Results   Component Value Date    WBC 11.3 (H) 01/27/2025    HGB 15.4 01/27/2025    HCT 46.8 01/27/2025     01/27/2025     01/27/2025    POTASSIUM 5.3  01/27/2025    CHLORIDE 103 01/27/2025    CO2 23 01/27/2025    BUN 33.2 (H) 01/27/2025    CR 1.46 (H) 01/27/2025     (H) 01/27/2025    SED 40 (H) 06/19/2017    NTBNPI 9,984 (H) 04/08/2017    NTBNP 1,641 (H) 09/24/2024    TROPI 0.267 (HH) 04/26/2017    AST 19 01/27/2025    ALT 10 01/27/2025    ALKPHOS 50 01/27/2025    BILITOTAL 0.8 01/27/2025    INR 0.99 02/28/2024      TTE 5/2023:  Left ventricular function is decreased. The ejection fraction is 10-20% (severely reduced).  Post MitraClip placement in 2017. Mild mitral insufficiency is present.  Global right ventricular function is mildly to moderately reduced.  Moderate tricuspid insufficiency is present. Moderate pulmonary hypertension is present.  IVC diameter <2.1 cm collapsing >50% with sniff suggests a normal RA pressure of 3 mmHg.  No pericardial effusion is present. No significant changes noted.     Device Check 5/24/23:  Device: [employer] [id number] Visia AF MRI VR  Normal device function.  Mode: VVI 40 bpm  : <0.1%  Intrinsic rhythm: VS 68 bpm  Thoracic Impedance:  Near reference line.   Short V-V intervals: 0  Lead Trends Appear Stable.  Estimated battery longevity to RRT = 6.9 years. Battery voltage = 2.99V.   Atrial Arrhythmia: None  AF Chandler: 0%  Anticoagulant: None  Ventricular Arrhythmia: None  Setting Changes: Last shock vector changed to B>AX from AX>B  Patient has an appointment to see Shyanne VENEGAS today.   Plan: Device follow-up every 3 months.     Right Heart catheterization on 11/2023:  Baseline hemodynamics  NIBP 147/87/111  HR 80  Hgb 16.6   RA 7/11/7  RV 69/-/11  PA 68/26/40  PCWP 20/26/20  PA sat 62%  Marvin 2.8/1.7  TD 2.8/1.7  PVR 7.1  After nipride infusion  NIBP 107/58/11  HR 80  CVP 2  PA 45/19/26  PCWP 10/12/10  PA sat 71%  Marvin 3.4/2.1  TD 3/1.8  PVR 4.8     ASSESSMENT AND PLAN:  In summary, patient is a 59 year old with a pmhx of ICM c/b RCA STEMI (s/p POBA RCA, FEMI RCA x7, Lcx. LAD 3/2017) c/b cardiogenic shock s/p IABP,  MR s/p mitral clip, HTN, DVT on AC, bilateral hemispheric strokes.  He has not been doing well the past 1 month and a half. He has been feeling cold, dyspnea on minimal exertion, orthopnea and PND. His creatinine is elevated, we added LFTs as an add-on to morning labs.      Overall he is telling me that he is doing well however he actually has not been taking his Bumex recently and he does have some edema in the extremity and definitely has some JVD when sitting up in the chair.  We noticed today that his creatinine was higher than previously hitting 1.8+ and his potassium was very high at 5.8.  Again he denies any symptoms or any complaints no dizziness lightheadedness palpitations or syncopal episodes with this.  At this time we had a long discussion with him and complaint very clearly that I am concerned about him overall.  We discussed that we did plan for right heart catheterization in the past but unfortunately he got scared and ultimately canceled the procedure on the spot.  However with the worsening creatinine and worsening renal function high potassium I am overall nervous that his condition is worsening and I recommended considering invasive evaluation again.  He again declined his understanding risk benefits noting that he is feeling well and would not want to do procedures rather than he would just take medications at this time.  He understands that I am concerned about the renal function as well as the high potassium.  To mitigate the risk we will do the following  - We will stop spironolactone at this time  - We will restart Bumex 0.5 mg once a day.  I think this will help with volume overload but also potentially decrease his creatinine and also reduce his potassium some  - He will need repeat lab work the end of the week which he has agreed to.  We will need to review his creatinine but most importantly his potassium  - I would like him to see one of our colleagues in the next couple of weeks to  month and then I can see him the end of the year.  - Again I remain concerned about his overall functional status and his worsening clinical numbers.  He verbalized understanding however again reinforced that he would not want to do any procedures at this time.  - Remote monitoring: Encouraged to utilize MyChart, coaching offerred  -Advanced therapies: He has quit smoking since 7 years, no recreational drug use.     I appreciate the opportunity to participate in the care of Luke Henao . Please do not hesitate to contact me with any further questions.  I have spent a total of 40 minutes today reviewing labs, imaging studies, discussing with colleagues, face-to-face time with patient and documentation in the medical record.  The longitudinal plan of care for the diagnosis(es)/condition(s) as documented were addressed during this visit. Due to the added complexity in care, I will continue to support Luke in the subsequent management and with ongoing continuity of care.    Sincerely,   Vipin Collier MD  [facility] Division of Cardiology

## 2025-04-22 ENCOUNTER — ANCILLARY PROCEDURE (OUTPATIENT)
Dept: CARDIOLOGY | Facility: CLINIC | Age: 60
End: 2025-04-22
Attending: INTERNAL MEDICINE
Payer: COMMERCIAL

## 2025-04-22 ENCOUNTER — LAB (OUTPATIENT)
Dept: LAB | Facility: CLINIC | Age: 60
End: 2025-04-22
Attending: INTERNAL MEDICINE
Payer: COMMERCIAL

## 2025-04-22 VITALS
BODY MASS INDEX: 25.17 KG/M2 | SYSTOLIC BLOOD PRESSURE: 126 MMHG | WEIGHT: 137.6 LBS | DIASTOLIC BLOOD PRESSURE: 79 MMHG | HEART RATE: 72 BPM | OXYGEN SATURATION: 99 %

## 2025-04-22 DIAGNOSIS — Z98.890 HISTORY OF MITRAL VALVE REPAIR: ICD-10-CM

## 2025-04-22 DIAGNOSIS — B33.24 VIRAL CARDIOMYOPATHY (H): ICD-10-CM

## 2025-04-22 DIAGNOSIS — I25.5 ISCHEMIC CARDIOMYOPATHY: ICD-10-CM

## 2025-04-22 DIAGNOSIS — E78.2 MIXED HYPERLIPIDEMIA: Primary | ICD-10-CM

## 2025-04-22 DIAGNOSIS — I51.89 OTHER ILL-DEFINED HEART DISEASES: ICD-10-CM

## 2025-04-22 DIAGNOSIS — I10 BENIGN ESSENTIAL HYPERTENSION: ICD-10-CM

## 2025-04-22 DIAGNOSIS — I50.22 CHRONIC SYSTOLIC CONGESTIVE HEART FAILURE (H): ICD-10-CM

## 2025-04-22 DIAGNOSIS — I25.10 CORONARY ARTERY DISEASE INVOLVING NATIVE CORONARY ARTERY OF NATIVE HEART WITHOUT ANGINA PECTORIS: ICD-10-CM

## 2025-04-22 DIAGNOSIS — I42.9 CARDIOMYOPATHY (H): ICD-10-CM

## 2025-04-22 LAB
ALBUMIN SERPL BCG-MCNC: 4.6 G/DL (ref 3.5–5.2)
ALP SERPL-CCNC: 60 U/L (ref 40–150)
ALT SERPL W P-5'-P-CCNC: 27 U/L (ref 0–70)
ANION GAP SERPL CALCULATED.3IONS-SCNC: 11 MMOL/L (ref 7–15)
AST SERPL W P-5'-P-CCNC: 27 U/L (ref 0–45)
BILIRUB SERPL-MCNC: 0.5 MG/DL
BUN SERPL-MCNC: 46.6 MG/DL (ref 6–20)
CALCIUM SERPL-MCNC: 9.1 MG/DL (ref 8.8–10.4)
CHLORIDE SERPL-SCNC: 102 MMOL/L (ref 98–107)
CREAT SERPL-MCNC: 1.86 MG/DL (ref 0.67–1.17)
EGFRCR SERPLBLD CKD-EPI 2021: 42 ML/MIN/1.73M2
ERYTHROCYTE [DISTWIDTH] IN BLOOD BY AUTOMATED COUNT: 12.8 % (ref 10–15)
GLUCOSE SERPL-MCNC: 149 MG/DL (ref 70–99)
HCO3 SERPL-SCNC: 24 MMOL/L (ref 22–29)
HCT VFR BLD AUTO: 46.6 % (ref 40–53)
HGB BLD-MCNC: 15.3 G/DL (ref 13.3–17.7)
MCH RBC QN AUTO: 31.8 PG (ref 26.5–33)
MCHC RBC AUTO-ENTMCNC: 32.8 G/DL (ref 31.5–36.5)
MCV RBC AUTO: 97 FL (ref 78–100)
NT-PROBNP SERPL-MCNC: 713 PG/ML (ref 0–900)
PLATELET # BLD AUTO: 206 10E3/UL (ref 150–450)
POTASSIUM SERPL-SCNC: 5.8 MMOL/L (ref 3.4–5.3)
PROT SERPL-MCNC: 8.5 G/DL (ref 6.4–8.3)
RBC # BLD AUTO: 4.81 10E6/UL (ref 4.4–5.9)
SODIUM SERPL-SCNC: 137 MMOL/L (ref 135–145)
WBC # BLD AUTO: 11.1 10E3/UL (ref 4–11)

## 2025-04-22 PROCEDURE — 93282 PRGRMG EVAL IMPLANTABLE DFB: CPT | Performed by: INTERNAL MEDICINE

## 2025-04-22 PROCEDURE — 83880 ASSAY OF NATRIURETIC PEPTIDE: CPT | Performed by: PATHOLOGY

## 2025-04-22 PROCEDURE — 85027 COMPLETE CBC AUTOMATED: CPT | Performed by: PATHOLOGY

## 2025-04-22 PROCEDURE — 99213 OFFICE O/P EST LOW 20 MIN: CPT | Performed by: INTERNAL MEDICINE

## 2025-04-22 PROCEDURE — 80053 COMPREHEN METABOLIC PANEL: CPT | Performed by: PATHOLOGY

## 2025-04-22 PROCEDURE — 36415 COLL VENOUS BLD VENIPUNCTURE: CPT | Performed by: PATHOLOGY

## 2025-04-22 RX ORDER — BUMETANIDE 0.5 MG/1
0.5 TABLET ORAL DAILY
Qty: 90 TABLET | Refills: 3 | Status: SHIPPED | OUTPATIENT
Start: 2025-04-22

## 2025-04-22 ASSESSMENT — PAIN SCALES - GENERAL: PAINLEVEL_OUTOF10: NO PAIN (0)

## 2025-04-22 NOTE — PATIENT INSTRUCTIONS
It was a pleasure to see you in clinic today, please do not hesitate to call us for questions or concerns.  Your next automatic remote ICD check from home is scheduled for 7/22/2025.    Bertha Hamm RN    Electrophysiology Nurse Clinician  [facility]    During Business Hours Please Call:  [phone]  After Hours Please Call:  [phone] - select option #4 and ask for job code [phone]

## 2025-04-22 NOTE — PATIENT INSTRUCTIONS
Dr. Collier recommends:    Stop taking Spironolactone.    Start taking Bumex 0.5 MG once daily.    Lab appointment this Friday. (BMP)    RTN [facility] appointment in 2-3 months with labs prior.    Follow up clinic visit with Dr. Collier in 7-8 months with labs prior.    Thank you for your visit today.  Please call me with any questions or concerns.   Glenn Sun RN  Cardiology Care Coordinator  [phone]

## 2025-04-22 NOTE — LETTER
4/22/2025      RE: Luke Henao  [address]  [facility] [address]       Dear Colleague,    Thank you for the opportunity to participate in the care of your patient, Luke Henao, at the [facility] [place] [facility]. Please see a copy of my visit note below.    April 22, 2025    I had the pleasure of seeing Luke Henao  in the [facility]. As you know patient has a complex medical history including ICM c/b RCA STEMI (s/p POBA RCA, FEMI RCA x7, Lcx. LAD 3/2017) c/b cardiogenic shock s/p IABP, MR s/p mitraclip, HTN, DVT on AC, bilateral hemispheric strokes. He presents to [facility] Clinic for follow-up.     The patient has been followed in [facility] for a number of years. He was initially transferred to [facility] on 3/27/2017 with cardiogenic shock after an inferior STEMI. Patient had received a POBA to his RCA at that time, as he was considered for CABG. Initial TTE with LVEF of 30-35%. Underwent coronary angiogram with a FEMI to his RCA, LAD, and Lcx. Further hospital course was complicated by cardiogenic shock c/b IABP. He underwent a Mitraclip for ischemic MR. Underwent ICD implant on 10/2017. His GDMT was titrated over the course of the following years. He had improvement in symptoms, though his LVEF continued to be reduced. Most recently 10-20%.       In 10/2023 he had noted he has some difficulty doing his job and develops lower extremity edema by end of the day. He had a RHC on 11/2023 which results are demonstrated below. Metoprolol was decreased from 200mg daily to 100mg.  At that time we decided to proceed with right heart catheterization given worsening symptoms and significant fatigue.  Patient presented to the Cath Lab for the study however got extremely anxious and ultimately we decided to postpone the procedure.  Today he is here for follow-up.      Overall he notes that he is feeling okay he did  stop the Bumex because he was urinating too much from it.  He takes all other medications and he confirms this.  Denies any dizziness lightheadedness or palpitations.  He denies also lower extremity edema but on exam he does have some as below.  He again notes that he is feeling relatively well    Works with hearing aid.   Never miss medications, works well  Lives with wife, house. 20 years remarried, has 4 children with ex-wife in [place]. He moved to [place] 20 years ago. Quit cigarette 7 years ago.      PAST MEDICAL HISTORY:  Past Medical History:   Diagnosis Date     Benign essential hypertension      CAD (coronary artery disease) 2017    RCA STEMI s/p balloon angioplasty and multiple Sofie to RCA, LCx, and LAD     Chronic systolic congestive heart failure (H)      History of deep venous thrombosis 2017    left internal jugular (line-associated); left common femoral; was on coumadin     History of mitral valve repair 2017     History of stroke 2017    no residual deficits     ICD (implantable cardioverter-defibrillator) in place      Type 2 diabetes mellitus (H)      FAMILY HISTORY:  Family History   Problem Relation Age of Onset     Hypertension Mother      Diabetes Type 2  Father      Hypertension Father      Myocardial Infarction No family hx of      Cerebrovascular Disease No family hx of      Coronary Artery Disease Early Onset No family hx of      Colon Cancer No family hx of      Prostate Cancer No family hx of      SOCIAL HISTORY:  Social History     Socioeconomic History     Marital status:    Occupational History     Occupation: [employer]   Tobacco Use     Smoking status: Former     Types: Cigarettes     Smokeless tobacco: Never     Tobacco comments:     Quit in 2017; smoked for 30 years; 0.5ppd at his most.    Vaping Use     Vaping status: Never Used   Substance and Sexual Activity     Alcohol use: No     Drug use: No     Sexual activity: Not Currently   Social History  Narrative    . Remarried.    4 children with first marriage.    5 grandchildren.    No formal exercise.      Social Drivers of Health     Financial Resource Strain: Unknown (1/27/2025)    Financial Resource Strain      Within the past 12 months, have you or your family members you live with been unable to get utilities (heat, electricity) when it was really needed?: Patient declined   Food Insecurity: Unknown (1/27/2025)    Food Insecurity      Within the past 12 months, did you worry that your food would run out before you got money to buy more?: Patient declined      Within the past 12 months, did the food you bought just not last and you didn t have money to get more?: Patient declined   Transportation Needs: Unknown (1/27/2025)    Transportation Needs      Within the past 12 months, has lack of transportation kept you from medical appointments, getting your medicines, non-medical meetings or appointments, work, or from getting things that you need?: Patient declined   Physical Activity: Sufficiently Active (1/27/2025)    Exercise Vital Sign      Days of Exercise per Week: 7 days      Minutes of Exercise per Session: 50 min   Stress: No Stress Concern Present (1/27/2025)    [employer] - Occupational Stress Questionnaire      Feeling of Stress : Not at all   Social Connections: Unknown (1/27/2025)    Social Connection and Isolation Panel [NHANES]      Frequency of Social Gatherings with Friends and Family: Twice a week   Interpersonal Safety: Low Risk  (12/13/2023)    Interpersonal Safety      Do you feel physically and emotionally safe where you currently live?: Yes      Within the past 12 months, have you been hit, slapped, kicked or otherwise physically hurt by someone?: No      Within the past 12 months, have you been humiliated or emotionally abused in other ways by your partner or ex-partner?: No   Housing Stability: Unknown (1/27/2025)    Housing Stability      Do you  have housing? : Patient declined      Are you worried about losing your housing?: Patient declined     CURRENT MEDICATIONS:  Current Outpatient Medications   Medication Sig Dispense Refill     aspirin (ASPIRIN LOW DOSE) 81 MG chewable tablet Take 1 tablet (81 mg) by mouth daily. 90 tablet 3     atorvastatin (LIPITOR) 40 MG tablet Take 1 tablet (40 mg) by mouth daily. 90 tablet 3     bumetanide (BUMEX) 0.5 MG tablet Take 1 tablet (0.5 mg) by mouth daily. 90 tablet 3     cetirizine (ZYRTEC) 10 MG tablet TAKE 1 TABLET (10 MG) BY MOUTH DAILY. 90 tablet 0     co-enzyme Q-10 100 MG CAPS capsule Take by mouth daily       dapagliflozin (FARXIGA) 10 MG TABS tablet Take 1 tablet (10 mg) by mouth daily. 90 tablet 3     diclofenac (VOLTAREN) 1 % topical gel Apply 2 g topically 4 times daily 150 g 3     fluticasone (FLONASE) 50 MCG/ACT nasal spray INSTILL 2 SPRAYS INTO BOTH NOSTRILS DAILY 48 mL 0     JANUVIA 100 MG tablet TAKE 1 TABLET BY MOUTH EVERY DAY 90 tablet 3     metFORMIN (GLUCOPHAGE XR) 500 MG 24 hr tablet TAKE 2 TABLETS BY MOUTH DAILY WITH DINNER 180 tablet 3     metoprolol succinate ER (TOPROL XL) 50 MG 24 hr tablet Take 1 tablet (50 mg) by mouth daily. 90 tablet 3     sacubitril-valsartan (ENTRESTO)  MG per tablet Take 1 tablet by mouth 2 times daily. 180 tablet 3     spironolactone (ALDACTONE) 25 MG tablet Take 1 tablet (25 mg) by mouth daily. 90 tablet 3     triamcinolone (KENALOG) 0.1 % external cream APPLY TO AFFECTED AREA TWICE A DAY 30 g 0     No current facility-administered medications for this visit.     EXAM:  /79 (BP Location: Left arm, Patient Position: Chair, Cuff Size: Adult Regular)   Pulse 72   Wt 62.4 kg (137 lb 9.6 oz)   SpO2 99%   BMI 25.17 kg/m    General: appears comfortable, alert and oriented  Head: normocephalic, atraumatic  Eyes: anicteric sclera, EOMI , PERRL  Neck: no adenopathy  Orophyarynx: moist mucosa, no lesions noted  Heart: regular, S1/S2, no murmurs, rubs or gallop.  JVP at 12  Lungs: CTAB, No wheezing.   Abdomen: soft, non-tender, slightly distended, bowel sounds present, no hepatosplenomegaly  Extremities: Trace BLE edema  Skin: Cool to ouch  Neuro: grossly non-focal     Labs:  Lab Results   Component Value Date    WBC 11.3 (H) 01/27/2025    HGB 15.4 01/27/2025    HCT 46.8 01/27/2025     01/27/2025     01/27/2025    POTASSIUM 5.3 01/27/2025    CHLORIDE 103 01/27/2025    CO2 23 01/27/2025    BUN 33.2 (H) 01/27/2025    CR 1.46 (H) 01/27/2025     (H) 01/27/2025    SED 40 (H) 06/19/2017    NTBNPI 9,984 (H) 04/08/2017    NTBNP 1,641 (H) 09/24/2024    TROPI 0.267 (HH) 04/26/2017    AST 19 01/27/2025    ALT 10 01/27/2025    ALKPHOS 50 01/27/2025    BILITOTAL 0.8 01/27/2025    INR 0.99 02/28/2024      TTE 5/2023:  Left ventricular function is decreased. The ejection fraction is 10-20% (severely reduced).  Post MitraClip placement in 2017. Mild mitral insufficiency is present.  Global right ventricular function is mildly to moderately reduced.  Moderate tricuspid insufficiency is present. Moderate pulmonary hypertension is present.  IVC diameter <2.1 cm collapsing >50% with sniff suggests a normal RA pressure of 3 mmHg.  No pericardial effusion is present. No significant changes noted.     Device Check 5/24/23:  Device: Medtronic [id number] Visia AF MRI VR  Normal device function.  Mode: VVI 40 bpm  : <0.1%  Intrinsic rhythm: VS 68 bpm  Thoracic Impedance:  Near reference line.   Short V-V intervals: 0  Lead Trends Appear Stable.  Estimated battery longevity to RRT = 6.9 years. Battery voltage = 2.99V.   Atrial Arrhythmia: None  AF Abbott: 0%  Anticoagulant: None  Ventricular Arrhythmia: None  Setting Changes: Last shock vector changed to B>AX from AX>B  Patient has an appointment to see Shyanne VENEGAS today.   Plan: Device follow-up every 3 months.     Right Heart catheterization on 11/2023:  Baseline hemodynamics  NIBP 147/87/111  HR 80  Hgb 16.6   RA  7/11/7  RV 69/-/11  PA 68/26/40  PCWP 20/26/20  PA sat 62%  Marvin 2.8/1.7  TD 2.8/1.7  PVR 7.1  After nipride infusion  NIBP 107/58/11  HR 80  CVP 2  PA 45/19/26  PCWP 10/12/10  PA sat 71%  Marvin 3.4/2.1  TD 3/1.8  PVR 4.8     ASSESSMENT AND PLAN:  In summary, patient is a 59 year old with a pmhx of ICM c/b RCA STEMI (s/p POBA RCA, FEMI RCA x7, Lcx. LAD 3/2017) c/b cardiogenic shock s/p IABP, MR s/p mitral clip, HTN, DVT on AC, bilateral hemispheric strokes.  He has not been doing well the past 1 month and a half. He has been feeling cold, dyspnea on minimal exertion, orthopnea and PND. His creatinine is elevated, we added LFTs as an add-on to morning labs.      Overall he is telling me that he is doing well however he actually has not been taking his Bumex recently and he does have some edema in the extremity and definitely has some JVD when sitting up in the chair.  We noticed today that his creatinine was higher than previously hitting 1.8+ and his potassium was very high at 5.8.  Again he denies any symptoms or any complaints no dizziness lightheadedness palpitations or syncopal episodes with this.  At this time we had a long discussion with him and complaint very clearly that I am concerned about him overall.  We discussed that we did plan for right heart catheterization in the past but unfortunately he got scared and ultimately canceled the procedure on the spot.  However with the worsening creatinine and worsening renal function high potassium I am overall nervous that his condition is worsening and I recommended considering invasive evaluation again.  He again declined his understanding risk benefits noting that he is feeling well and would not want to do procedures rather than he would just take medications at this time.  He understands that I am concerned about the renal function as well as the high potassium.  To mitigate the risk we will do the following  - We will stop spironolactone at this time  - We  will restart Bumex 0.5 mg once a day.  I think this will help with volume overload but also potentially decrease his creatinine and also reduce his potassium some  - He will need repeat lab work the end of the week which he has agreed to.  We will need to review his creatinine but most importantly his potassium  - I would like him to see one of our colleagues in the next couple of weeks to month and then I can see him the end of the year.  - Again I remain concerned about his overall functional status and his worsening clinical numbers.  He verbalized understanding however again reinforced that he would not want to do any procedures at this time.  - Remote monitoring: Encouraged to utilize MyChart, coaching offerred  -Advanced therapies: He has quit smoking since 7 years, no recreational drug use.     I appreciate the opportunity to participate in the care of Luke Henao . Please do not hesitate to contact me with any further questions.  I have spent a total of 40 minutes today reviewing labs, imaging studies, discussing with colleagues, face-to-face time with patient and documentation in the medical record.  The longitudinal plan of care for the diagnosis(es)/condition(s) as documented were addressed during this visit. Due to the added complexity in care, I will continue to support Luke in the subsequent management and with ongoing continuity of care.    Sincerely,   Vipin Collier MD  [facility] Division of Cardiology         Please do not hesitate to contact me if you have any questions/concerns.     Sincerely,     Vipin Collier MD

## 2025-04-22 NOTE — NURSING NOTE
Chief Complaint   Patient presents with    Follow Up     RETURN HEART FAILURE     Vitals were taken and medications reconciled.    Alexx Linda, NATHALIE  10:48 AM

## 2025-04-23 LAB
MDC_IDC_LEAD_CONNECTION_STATUS: NORMAL
MDC_IDC_LEAD_IMPLANT_DT: NORMAL
MDC_IDC_LEAD_LOCATION: NORMAL
MDC_IDC_LEAD_LOCATION_DETAIL_1: NORMAL
MDC_IDC_LEAD_MFG: NORMAL
MDC_IDC_LEAD_MODEL: NORMAL
MDC_IDC_LEAD_POLARITY_TYPE: NORMAL
MDC_IDC_LEAD_SERIAL: NORMAL
MDC_IDC_LEAD_SPECIAL_FUNCTION: NORMAL
MDC_IDC_MSMT_BATTERY_DTM: NORMAL
MDC_IDC_MSMT_BATTERY_REMAINING_LONGEVITY: 58 MO
MDC_IDC_MSMT_BATTERY_RRT_TRIGGER: 2.73
MDC_IDC_MSMT_BATTERY_STATUS: NORMAL
MDC_IDC_MSMT_BATTERY_VOLTAGE: 2.98 V
MDC_IDC_MSMT_LEADCHNL_RV_IMPEDANCE_VALUE: 304 OHM
MDC_IDC_MSMT_LEADCHNL_RV_IMPEDANCE_VALUE: 342 OHM
MDC_IDC_MSMT_LEADCHNL_RV_PACING_THRESHOLD_AMPLITUDE: 0.5 V
MDC_IDC_MSMT_LEADCHNL_RV_PACING_THRESHOLD_PULSEWIDTH: 0.4 MS
MDC_IDC_MSMT_LEADCHNL_RV_SENSING_INTR_AMPL: 7 MV
MDC_IDC_PG_IMPLANT_DTM: NORMAL
MDC_IDC_PG_MFG: NORMAL
MDC_IDC_PG_MODEL: NORMAL
MDC_IDC_PG_SERIAL: NORMAL
MDC_IDC_PG_TYPE: NORMAL
MDC_IDC_SESS_CLINIC_NAME: NORMAL
MDC_IDC_SESS_DTM: NORMAL
MDC_IDC_SESS_TYPE: NORMAL
MDC_IDC_SET_BRADY_HYSTRATE: NORMAL
MDC_IDC_SET_BRADY_LOWRATE: 40 {BEATS}/MIN
MDC_IDC_SET_BRADY_MODE: NORMAL
MDC_IDC_SET_LEADCHNL_RV_PACING_AMPLITUDE: 2 V
MDC_IDC_SET_LEADCHNL_RV_PACING_ANODE_ELECTRODE_1: NORMAL
MDC_IDC_SET_LEADCHNL_RV_PACING_ANODE_LOCATION_1: NORMAL
MDC_IDC_SET_LEADCHNL_RV_PACING_CAPTURE_MODE: NORMAL
MDC_IDC_SET_LEADCHNL_RV_PACING_CATHODE_ELECTRODE_1: NORMAL
MDC_IDC_SET_LEADCHNL_RV_PACING_CATHODE_LOCATION_1: NORMAL
MDC_IDC_SET_LEADCHNL_RV_PACING_POLARITY: NORMAL
MDC_IDC_SET_LEADCHNL_RV_PACING_PULSEWIDTH: 0.4 MS
MDC_IDC_SET_LEADCHNL_RV_SENSING_ANODE_ELECTRODE_1: NORMAL
MDC_IDC_SET_LEADCHNL_RV_SENSING_ANODE_LOCATION_1: NORMAL
MDC_IDC_SET_LEADCHNL_RV_SENSING_CATHODE_ELECTRODE_1: NORMAL
MDC_IDC_SET_LEADCHNL_RV_SENSING_CATHODE_LOCATION_1: NORMAL
MDC_IDC_SET_LEADCHNL_RV_SENSING_POLARITY: NORMAL
MDC_IDC_SET_LEADCHNL_RV_SENSING_SENSITIVITY: 0.3 MV
MDC_IDC_SET_ZONE_DETECTION_BEATS_DENOMINATOR: 16 {BEATS}
MDC_IDC_SET_ZONE_DETECTION_BEATS_DENOMINATOR: 32 {BEATS}
MDC_IDC_SET_ZONE_DETECTION_BEATS_DENOMINATOR: 40 {BEATS}
MDC_IDC_SET_ZONE_DETECTION_BEATS_NUMERATOR: 16 {BEATS}
MDC_IDC_SET_ZONE_DETECTION_BEATS_NUMERATOR: 30 {BEATS}
MDC_IDC_SET_ZONE_DETECTION_BEATS_NUMERATOR: 32 {BEATS}
MDC_IDC_SET_ZONE_DETECTION_INTERVAL: 300 MS
MDC_IDC_SET_ZONE_DETECTION_INTERVAL: 360 MS
MDC_IDC_SET_ZONE_DETECTION_INTERVAL: 390 MS
MDC_IDC_SET_ZONE_DETECTION_INTERVAL: NORMAL
MDC_IDC_SET_ZONE_STATUS: NORMAL
MDC_IDC_SET_ZONE_TYPE: NORMAL
MDC_IDC_SET_ZONE_VENDOR_TYPE: NORMAL
MDC_IDC_STAT_AT_BURDEN_PERCENT: 0 %
MDC_IDC_STAT_AT_DTM_END: NORMAL
MDC_IDC_STAT_AT_DTM_START: NORMAL
MDC_IDC_STAT_BRADY_DTM_END: NORMAL
MDC_IDC_STAT_BRADY_DTM_START: NORMAL
MDC_IDC_STAT_BRADY_RV_PERCENT_PACED: 0.01 %
MDC_IDC_STAT_EPISODE_RECENT_COUNT: 0
MDC_IDC_STAT_EPISODE_RECENT_COUNT_DTM_END: NORMAL
MDC_IDC_STAT_EPISODE_RECENT_COUNT_DTM_START: NORMAL
MDC_IDC_STAT_EPISODE_TOTAL_COUNT: 0
MDC_IDC_STAT_EPISODE_TOTAL_COUNT_DTM_END: NORMAL
MDC_IDC_STAT_EPISODE_TOTAL_COUNT_DTM_START: NORMAL
MDC_IDC_STAT_EPISODE_TYPE: NORMAL
MDC_IDC_STAT_TACHYTHERAPY_ATP_DELIVERED_RECENT: 0
MDC_IDC_STAT_TACHYTHERAPY_ATP_DELIVERED_TOTAL: 0
MDC_IDC_STAT_TACHYTHERAPY_RECENT_DTM_END: NORMAL
MDC_IDC_STAT_TACHYTHERAPY_RECENT_DTM_START: NORMAL
MDC_IDC_STAT_TACHYTHERAPY_SHOCKS_ABORTED_RECENT: 0
MDC_IDC_STAT_TACHYTHERAPY_SHOCKS_ABORTED_TOTAL: 0
MDC_IDC_STAT_TACHYTHERAPY_SHOCKS_DELIVERED_RECENT: 0
MDC_IDC_STAT_TACHYTHERAPY_SHOCKS_DELIVERED_TOTAL: 0
MDC_IDC_STAT_TACHYTHERAPY_TOTAL_DTM_END: NORMAL
MDC_IDC_STAT_TACHYTHERAPY_TOTAL_DTM_START: NORMAL

## 2025-05-18 DIAGNOSIS — R09.81 SINUS CONGESTION: ICD-10-CM

## 2025-05-19 RX ORDER — FLUTICASONE PROPIONATE 50 MCG
2 SPRAY, SUSPENSION (ML) NASAL DAILY
Qty: 48 ML | Refills: 0 | Status: SHIPPED | OUTPATIENT
Start: 2025-05-19 | End: 2025-05-21

## 2025-05-21 RX ORDER — FLUTICASONE PROPIONATE 50 MCG
2 SPRAY, SUSPENSION (ML) NASAL DAILY
Qty: 48 ML | Refills: 0 | Status: SHIPPED | OUTPATIENT
Start: 2025-05-21

## 2025-06-17 DIAGNOSIS — I50.22 CHRONIC SYSTOLIC CONGESTIVE HEART FAILURE (H): Primary | ICD-10-CM

## 2025-07-22 ENCOUNTER — ANCILLARY PROCEDURE (OUTPATIENT)
Dept: CARDIOLOGY | Facility: CLINIC | Age: 60
End: 2025-07-22
Attending: INTERNAL MEDICINE
Payer: COMMERCIAL

## 2025-07-22 DIAGNOSIS — I42.9 CARDIOMYOPATHY (H): ICD-10-CM

## 2025-07-22 PROCEDURE — 93295 DEV INTERROG REMOTE 1/2/MLT: CPT | Performed by: INTERNAL MEDICINE

## 2025-07-22 PROCEDURE — 93296 REM INTERROG EVL PM/IDS: CPT

## 2025-07-25 LAB
MDC_IDC_LEAD_CONNECTION_STATUS: NORMAL
MDC_IDC_LEAD_IMPLANT_DT: NORMAL
MDC_IDC_LEAD_LOCATION: NORMAL
MDC_IDC_LEAD_LOCATION_DETAIL_1: NORMAL
MDC_IDC_LEAD_MFG: NORMAL
MDC_IDC_LEAD_MODEL: NORMAL
MDC_IDC_LEAD_POLARITY_TYPE: NORMAL
MDC_IDC_LEAD_SERIAL: NORMAL
MDC_IDC_LEAD_SPECIAL_FUNCTION: NORMAL
MDC_IDC_MSMT_BATTERY_DTM: NORMAL
MDC_IDC_MSMT_BATTERY_REMAINING_LONGEVITY: 52 MO
MDC_IDC_MSMT_BATTERY_RRT_TRIGGER: 2.73
MDC_IDC_MSMT_BATTERY_STATUS: NORMAL
MDC_IDC_MSMT_BATTERY_VOLTAGE: 2.98 V
MDC_IDC_MSMT_LEADCHNL_RV_IMPEDANCE_VALUE: 266 OHM
MDC_IDC_MSMT_LEADCHNL_RV_IMPEDANCE_VALUE: 342 OHM
MDC_IDC_MSMT_LEADCHNL_RV_PACING_THRESHOLD_AMPLITUDE: 0.5 V
MDC_IDC_MSMT_LEADCHNL_RV_PACING_THRESHOLD_PULSEWIDTH: 0.4 MS
MDC_IDC_MSMT_LEADCHNL_RV_SENSING_INTR_AMPL: 8.38 MV
MDC_IDC_PG_IMPLANT_DTM: NORMAL
MDC_IDC_PG_MFG: NORMAL
MDC_IDC_PG_MODEL: NORMAL
MDC_IDC_PG_SERIAL: NORMAL
MDC_IDC_PG_TYPE: NORMAL
MDC_IDC_SESS_CLINIC_NAME: NORMAL
MDC_IDC_SESS_DTM: NORMAL
MDC_IDC_SESS_TYPE: NORMAL
MDC_IDC_SET_BRADY_HYSTRATE: NORMAL
MDC_IDC_SET_BRADY_LOWRATE: 40 {BEATS}/MIN
MDC_IDC_SET_BRADY_MODE: NORMAL
MDC_IDC_SET_LEADCHNL_RV_PACING_AMPLITUDE: 2 V
MDC_IDC_SET_LEADCHNL_RV_PACING_ANODE_ELECTRODE_1: NORMAL
MDC_IDC_SET_LEADCHNL_RV_PACING_ANODE_LOCATION_1: NORMAL
MDC_IDC_SET_LEADCHNL_RV_PACING_CAPTURE_MODE: NORMAL
MDC_IDC_SET_LEADCHNL_RV_PACING_CATHODE_ELECTRODE_1: NORMAL
MDC_IDC_SET_LEADCHNL_RV_PACING_CATHODE_LOCATION_1: NORMAL
MDC_IDC_SET_LEADCHNL_RV_PACING_POLARITY: NORMAL
MDC_IDC_SET_LEADCHNL_RV_PACING_PULSEWIDTH: 0.4 MS
MDC_IDC_SET_LEADCHNL_RV_SENSING_ANODE_ELECTRODE_1: NORMAL
MDC_IDC_SET_LEADCHNL_RV_SENSING_ANODE_LOCATION_1: NORMAL
MDC_IDC_SET_LEADCHNL_RV_SENSING_CATHODE_ELECTRODE_1: NORMAL
MDC_IDC_SET_LEADCHNL_RV_SENSING_CATHODE_LOCATION_1: NORMAL
MDC_IDC_SET_LEADCHNL_RV_SENSING_POLARITY: NORMAL
MDC_IDC_SET_LEADCHNL_RV_SENSING_SENSITIVITY: 0.3 MV
MDC_IDC_SET_ZONE_DETECTION_BEATS_DENOMINATOR: 16 {BEATS}
MDC_IDC_SET_ZONE_DETECTION_BEATS_DENOMINATOR: 32 {BEATS}
MDC_IDC_SET_ZONE_DETECTION_BEATS_DENOMINATOR: 40 {BEATS}
MDC_IDC_SET_ZONE_DETECTION_BEATS_NUMERATOR: 16 {BEATS}
MDC_IDC_SET_ZONE_DETECTION_BEATS_NUMERATOR: 30 {BEATS}
MDC_IDC_SET_ZONE_DETECTION_BEATS_NUMERATOR: 32 {BEATS}
MDC_IDC_SET_ZONE_DETECTION_INTERVAL: 300 MS
MDC_IDC_SET_ZONE_DETECTION_INTERVAL: 360 MS
MDC_IDC_SET_ZONE_DETECTION_INTERVAL: 390 MS
MDC_IDC_SET_ZONE_DETECTION_INTERVAL: NORMAL
MDC_IDC_SET_ZONE_STATUS: NORMAL
MDC_IDC_SET_ZONE_TYPE: NORMAL
MDC_IDC_SET_ZONE_VENDOR_TYPE: NORMAL
MDC_IDC_STAT_AT_BURDEN_PERCENT: 0 %
MDC_IDC_STAT_AT_DTM_END: NORMAL
MDC_IDC_STAT_AT_DTM_START: NORMAL
MDC_IDC_STAT_BRADY_DTM_END: NORMAL
MDC_IDC_STAT_BRADY_DTM_START: NORMAL
MDC_IDC_STAT_BRADY_RV_PERCENT_PACED: 0.01 %
MDC_IDC_STAT_EPISODE_RECENT_COUNT: 0
MDC_IDC_STAT_EPISODE_RECENT_COUNT_DTM_END: NORMAL
MDC_IDC_STAT_EPISODE_RECENT_COUNT_DTM_START: NORMAL
MDC_IDC_STAT_EPISODE_TOTAL_COUNT: 0
MDC_IDC_STAT_EPISODE_TOTAL_COUNT_DTM_END: NORMAL
MDC_IDC_STAT_EPISODE_TOTAL_COUNT_DTM_START: NORMAL
MDC_IDC_STAT_EPISODE_TYPE: NORMAL
MDC_IDC_STAT_TACHYTHERAPY_ATP_DELIVERED_RECENT: 0
MDC_IDC_STAT_TACHYTHERAPY_ATP_DELIVERED_TOTAL: 0
MDC_IDC_STAT_TACHYTHERAPY_RECENT_DTM_END: NORMAL
MDC_IDC_STAT_TACHYTHERAPY_RECENT_DTM_START: NORMAL
MDC_IDC_STAT_TACHYTHERAPY_SHOCKS_ABORTED_RECENT: 0
MDC_IDC_STAT_TACHYTHERAPY_SHOCKS_ABORTED_TOTAL: 0
MDC_IDC_STAT_TACHYTHERAPY_SHOCKS_DELIVERED_RECENT: 0
MDC_IDC_STAT_TACHYTHERAPY_SHOCKS_DELIVERED_TOTAL: 0
MDC_IDC_STAT_TACHYTHERAPY_TOTAL_DTM_END: NORMAL
MDC_IDC_STAT_TACHYTHERAPY_TOTAL_DTM_START: NORMAL

## (undated) DEVICE — INTRO SHEATH 7FRX10CM PINNACLE RSS702

## (undated) DEVICE — SOL NACL 0.9% IRRIG 1000ML BOTTLE 2F7124

## (undated) DEVICE — KIT MICROINTRODUCER VASCULAR  4FRX21GAX4CM

## (undated) DEVICE — DRAPE IOBAN INCISE 13X13" 6640EZ

## (undated) DEVICE — PREP CHLORHEXIDINE 4% 4OZ (HIBICLENS) 57504

## (undated) DEVICE — SU PROLENE 4-0 RB-1DA 36" 8557H

## (undated) DEVICE — SYR 10ML FINGER CONTROL W/O NDL 309695

## (undated) DEVICE — LINEN TOWEL PACK X6 WHITE 5487

## (undated) DEVICE — SYR 10ML LL W/O NDL

## (undated) DEVICE — LINEN GOWN XLG 5407

## (undated) DEVICE — DEFIB PRO-PADZ LVP LQD GEL ADULT 8900-2105-01

## (undated) DEVICE — GLOVE NEOLON 2G NEOPRENE PF 7.5 LATEX FREE MSG6075

## (undated) DEVICE — SYR 50ML LL W/O NDL 309653

## (undated) DEVICE — COVER EASY EQUIP BAG W/BAND LATEX FREE EZ-28

## (undated) DEVICE — Device

## (undated) DEVICE — DRAPE IOBAN INCISE 23X17" 6650EZ

## (undated) DEVICE — CLIP HORIZON MED BLUE 002200

## (undated) DEVICE — DRSG TEGADERM 4X4 3/4" 1626W

## (undated) DEVICE — SU VICRYL 2-0 CT-1 27" UND J259H

## (undated) DEVICE — SOL NACL 0.9% 10ML VIAL 0409-4888-02

## (undated) DEVICE — SU ETHIBOND 0 CT-1 CR 8X18" CX21D

## (undated) DEVICE — TOURNIQUET VASCULAR KIT 7 1/2" 79012

## (undated) DEVICE — BLADE CLIPPER SGL USE 9680

## (undated) DEVICE — SU PROLENE 4-0 SHDA 36" 8521H

## (undated) DEVICE — SU VICRYL 2-0 SH 27" UND J417H

## (undated) DEVICE — SUTURE BOOTS 051003PBX

## (undated) DEVICE — PACKING NUGAUZE 1/4" PLAIN 7631

## (undated) DEVICE — PREP CHLORAPREP 26ML TINTED ORANGE  260815

## (undated) DEVICE — GLOVE LINER HALF FINGER NYLON KP5015/50

## (undated) DEVICE — SOL WATER IRRIG 1000ML BOTTLE 07139-09

## (undated) DEVICE — DRAPE MAYO STAND 23X54 8337

## (undated) DEVICE — PROTECTOR ARM ONE-STEP TRENDELENBURG 40418

## (undated) DEVICE — SU DERMABOND ADVANCED .7ML DNX12

## (undated) DEVICE — DEFIB PADPRO CONMED ADULT/CHILD 2001Z-C

## (undated) DEVICE — NDL 18GAX1.5" 305185

## (undated) DEVICE — SU VICRYL 0 CT-1 36" J346H

## (undated) DEVICE — UMMC CONVENIENCE KIT FORMALLY H9656021017160 NEW# 602101716

## (undated) DEVICE — CLIP HORIZON SM RED WIDE SLOT 001201

## (undated) DEVICE — SU VICRYL 3-0 FS-1 27" J442H

## (undated) DEVICE — LINEN TOWEL PACK X30 5481

## (undated) DEVICE — SU MONOCRYL 4-0 PS-2 27" UND Y426H

## (undated) DEVICE — SOL NACL 0.9% INJ 1000ML BAG 2B1324X

## (undated) DEVICE — SU PROLENE 5-0 RB-2DA 30" 8710H

## (undated) DEVICE — SU ETHIBOND 0 TIE 6X30" X306H

## (undated) DEVICE — DRSG GAUZE 4X4" 2187

## (undated) DEVICE — PACK HEART RIGHT CUSTOM SAN32RHF18

## (undated) DEVICE — VESSEL LOOPS YELLOW MAXI 31145694

## (undated) RX ORDER — FENTANYL CITRATE 50 UG/ML
INJECTION, SOLUTION INTRAMUSCULAR; INTRAVENOUS
Status: DISPENSED
Start: 2017-04-13

## (undated) RX ORDER — DOPAMINE HYDROCHLORIDE 160 MG/100ML
INJECTION, SOLUTION INTRAVENOUS
Status: DISPENSED
Start: 2017-03-26

## (undated) RX ORDER — LIDOCAINE 40 MG/G
CREAM TOPICAL
Status: DISPENSED
Start: 2023-11-08

## (undated) RX ORDER — SODIUM CHLORIDE 9 MG/ML
INJECTION, SOLUTION INTRAVENOUS
Status: DISPENSED
Start: 2017-10-27

## (undated) RX ORDER — LIDOCAINE 40 MG/G
CREAM TOPICAL
Status: DISPENSED
Start: 2024-02-28

## (undated) RX ORDER — FENTANYL CITRATE 50 UG/ML
INJECTION, SOLUTION INTRAMUSCULAR; INTRAVENOUS
Status: DISPENSED
Start: 2017-03-27

## (undated) RX ORDER — CEFAZOLIN SODIUM 1 G/3ML
INJECTION, POWDER, FOR SOLUTION INTRAMUSCULAR; INTRAVENOUS
Status: DISPENSED
Start: 2017-10-27

## (undated) RX ORDER — FENTANYL CITRATE 50 UG/ML
INJECTION, SOLUTION INTRAMUSCULAR; INTRAVENOUS
Status: DISPENSED
Start: 2017-10-27

## (undated) RX ORDER — HEPARIN SODIUM 1000 [USP'U]/ML
INJECTION, SOLUTION INTRAVENOUS; SUBCUTANEOUS
Status: DISPENSED
Start: 2017-03-26

## (undated) RX ORDER — CEFAZOLIN SODIUM 2 G/100ML
INJECTION, SOLUTION INTRAVENOUS
Status: DISPENSED
Start: 2017-10-27

## (undated) RX ORDER — CEFAZOLIN SODIUM 1 G/3ML
INJECTION, POWDER, FOR SOLUTION INTRAMUSCULAR; INTRAVENOUS
Status: DISPENSED
Start: 2017-05-08

## (undated) RX ORDER — DOPAMINE HYDROCHLORIDE 160 MG/100ML
INJECTION, SOLUTION INTRAVENOUS
Status: DISPENSED
Start: 2017-03-27

## (undated) RX ORDER — NICARDIPINE HYDROCHLORIDE 2.5 MG/ML
INJECTION INTRAVENOUS
Status: DISPENSED
Start: 2017-03-27

## (undated) RX ORDER — SODIUM NITROPRUSSIDE 25 MG/ML
INJECTION INTRAVENOUS
Status: DISPENSED
Start: 2023-11-08

## (undated) RX ORDER — SODIUM NITROPRUSSIDE 25 MG/ML
INJECTION INTRAVENOUS
Status: DISPENSED
Start: 2017-04-13

## (undated) RX ORDER — CEFAZOLIN SODIUM 1 G/3ML
INJECTION, POWDER, FOR SOLUTION INTRAMUSCULAR; INTRAVENOUS
Status: DISPENSED
Start: 2017-04-18

## (undated) RX ORDER — NITROGLYCERIN 5 MG/ML
VIAL (ML) INTRAVENOUS
Status: DISPENSED
Start: 2017-03-26

## (undated) RX ORDER — FENTANYL CITRATE 50 UG/ML
INJECTION, SOLUTION INTRAMUSCULAR; INTRAVENOUS
Status: DISPENSED
Start: 2017-03-26

## (undated) RX ORDER — LIDOCAINE HYDROCHLORIDE 10 MG/ML
INJECTION, SOLUTION EPIDURAL; INFILTRATION; INTRACAUDAL; PERINEURAL
Status: DISPENSED
Start: 2017-05-08

## (undated) RX ORDER — HEPARIN SODIUM 1000 [USP'U]/ML
INJECTION, SOLUTION INTRAVENOUS; SUBCUTANEOUS
Status: DISPENSED
Start: 2017-04-18

## (undated) RX ORDER — VERAPAMIL HYDROCHLORIDE 2.5 MG/ML
INJECTION, SOLUTION INTRAVENOUS
Status: DISPENSED
Start: 2017-03-26

## (undated) RX ORDER — FENTANYL CITRATE 50 UG/ML
INJECTION, SOLUTION INTRAMUSCULAR; INTRAVENOUS
Status: DISPENSED
Start: 2017-05-08

## (undated) RX ORDER — HEPARIN SODIUM 1000 [USP'U]/ML
INJECTION, SOLUTION INTRAVENOUS; SUBCUTANEOUS
Status: DISPENSED
Start: 2017-04-01

## (undated) RX ORDER — ASPIRIN 81 MG/1
TABLET, CHEWABLE ORAL
Status: DISPENSED
Start: 2017-03-27